# Patient Record
Sex: MALE | Race: WHITE | Employment: UNEMPLOYED | URBAN - METROPOLITAN AREA
[De-identification: names, ages, dates, MRNs, and addresses within clinical notes are randomized per-mention and may not be internally consistent; named-entity substitution may affect disease eponyms.]

---

## 2019-04-07 ENCOUNTER — APPOINTMENT (OUTPATIENT)
Dept: GENERAL RADIOLOGY | Age: 54
DRG: 165 | End: 2019-04-07
Payer: MEDICAID

## 2019-04-07 ENCOUNTER — ANESTHESIA EVENT (OUTPATIENT)
Dept: OPERATING ROOM | Age: 54
DRG: 165 | End: 2019-04-07
Payer: MEDICAID

## 2019-04-07 ENCOUNTER — HOSPITAL ENCOUNTER (INPATIENT)
Age: 54
LOS: 18 days | Discharge: NURSING, REHAB OR LONG-TERM CARE | DRG: 165 | End: 2019-04-25
Attending: THORACIC SURGERY (CARDIOTHORACIC VASCULAR SURGERY) | Admitting: INTERNAL MEDICINE
Payer: MEDICAID

## 2019-04-07 ENCOUNTER — ANESTHESIA (OUTPATIENT)
Dept: OPERATING ROOM | Age: 54
DRG: 165 | End: 2019-04-07
Payer: MEDICAID

## 2019-04-07 VITALS — OXYGEN SATURATION: 98 % | SYSTOLIC BLOOD PRESSURE: 39 MMHG | DIASTOLIC BLOOD PRESSURE: 22 MMHG

## 2019-04-07 DIAGNOSIS — I21.09 ACUTE ST ELEVATION MYOCARDIAL INFARCTION (STEMI) OF ANTERIOR WALL (HCC): Primary | ICD-10-CM

## 2019-04-07 DIAGNOSIS — I46.9 CARDIAC ARREST (HCC): ICD-10-CM

## 2019-04-07 PROBLEM — I73.9 PAD (PERIPHERAL ARTERY DISEASE) (HCC): Status: ACTIVE | Noted: 2019-04-07

## 2019-04-07 PROBLEM — J96.01 ACUTE RESPIRATORY FAILURE WITH HYPOXEMIA (HCC): Status: ACTIVE | Noted: 2019-04-07

## 2019-04-07 PROBLEM — I51.89 SEVERE LEFT VENTRICULAR SYSTOLIC DYSFUNCTION: Status: ACTIVE | Noted: 2019-04-07

## 2019-04-07 PROBLEM — I50.1 CARDIOGENIC PULMONARY EDEMA (HCC): Status: ACTIVE | Noted: 2019-04-07

## 2019-04-07 PROBLEM — I51.9 SEVERE LEFT VENTRICULAR SYSTOLIC DYSFUNCTION: Status: ACTIVE | Noted: 2019-04-07

## 2019-04-07 LAB
ABSOLUTE EOS #: 0.53 K/UL (ref 0–0.4)
ABSOLUTE IMMATURE GRANULOCYTE: 0.53 K/UL (ref 0–0.3)
ABSOLUTE LYMPH #: 5.78 K/UL (ref 1–4.8)
ABSOLUTE MONO #: 0.18 K/UL (ref 0.1–0.8)
ALBUMIN SERPL-MCNC: 3.8 G/DL (ref 3.5–5.2)
ALBUMIN/GLOBULIN RATIO: 2.9 (ref 1–2.5)
ALLEN TEST: ABNORMAL
ALP BLD-CCNC: 37 U/L (ref 40–129)
ALT SERPL-CCNC: 63 U/L (ref 5–41)
ANION GAP SERPL CALCULATED.3IONS-SCNC: 12 MMOL/L (ref 9–17)
ANION GAP SERPL CALCULATED.3IONS-SCNC: 24 MMOL/L (ref 9–17)
ANION GAP SERPL CALCULATED.3IONS-SCNC: 7 MMOL/L (ref 9–17)
ANION GAP SERPL CALCULATED.3IONS-SCNC: 9 MMOL/L (ref 9–17)
ANION GAP: 11 MMOL/L (ref 7–16)
ANION GAP: 11 MMOL/L (ref 7–16)
ANION GAP: 16 MMOL/L (ref 7–16)
ANION GAP: 3 MMOL/L (ref 7–16)
ANION GAP: 6 MMOL/L (ref 7–16)
AST SERPL-CCNC: 148 U/L
BASOPHILS # BLD: 0 % (ref 0–2)
BASOPHILS ABSOLUTE: 0 K/UL (ref 0–0.2)
BILIRUB SERPL-MCNC: 0.71 MG/DL (ref 0.3–1.2)
BILIRUBIN DIRECT: 0.18 MG/DL
BILIRUBIN, INDIRECT: 0.53 MG/DL (ref 0–1)
BNP INTERPRETATION: ABNORMAL
BUN BLDV-MCNC: 16 MG/DL (ref 6–20)
BUN/CREAT BLD: ABNORMAL (ref 9–20)
CALCIUM IONIZED: 1.01 MMOL/L (ref 1.13–1.33)
CALCIUM SERPL-MCNC: 8.1 MG/DL (ref 8.6–10.4)
CALCIUM SERPL-MCNC: 8.1 MG/DL (ref 8.6–10.4)
CALCIUM SERPL-MCNC: 8.4 MG/DL (ref 8.6–10.4)
CHLORIDE BLD-SCNC: 111 MMOL/L (ref 98–107)
CHLORIDE BLD-SCNC: 116 MMOL/L (ref 98–107)
CHLORIDE BLD-SCNC: 117 MMOL/L (ref 98–107)
CHLORIDE BLD-SCNC: 95 MMOL/L (ref 98–107)
CO2: 15 MMOL/L (ref 20–31)
CO2: 23 MMOL/L (ref 20–31)
CO2: 24 MMOL/L (ref 20–31)
CO2: 26 MMOL/L (ref 20–31)
CREAT SERPL-MCNC: 0.65 MG/DL (ref 0.7–1.2)
CREAT SERPL-MCNC: 0.66 MG/DL (ref 0.7–1.2)
CREAT SERPL-MCNC: 0.9 MG/DL (ref 0.7–1.2)
DIFFERENTIAL TYPE: ABNORMAL
EOSINOPHILS RELATIVE PERCENT: 3 % (ref 1–4)
ESTIMATED AVERAGE GLUCOSE: 263 MG/DL
FIO2: 100
FIO2: 50
FIO2: 60
FIO2: ABNORMAL
GFR AFRICAN AMERICAN: >60 ML/MIN
GFR NON-AFRICAN AMERICAN: >60 ML/MIN
GFR SERPL CREATININE-BSD FRML MDRD: >60 ML/MIN
GFR SERPL CREATININE-BSD FRML MDRD: ABNORMAL ML/MIN/{1.73_M2}
GFR SERPL CREATININE-BSD FRML MDRD: NORMAL ML/MIN/{1.73_M2}
GLOBULIN: ABNORMAL G/DL (ref 1.5–3.8)
GLUCOSE BLD-MCNC: 110 MG/DL (ref 70–99)
GLUCOSE BLD-MCNC: 111 MG/DL (ref 75–110)
GLUCOSE BLD-MCNC: 113 MG/DL (ref 74–100)
GLUCOSE BLD-MCNC: 125 MG/DL (ref 75–110)
GLUCOSE BLD-MCNC: 133 MG/DL (ref 75–110)
GLUCOSE BLD-MCNC: 154 MG/DL (ref 74–100)
GLUCOSE BLD-MCNC: 154 MG/DL (ref 75–110)
GLUCOSE BLD-MCNC: 155 MG/DL (ref 70–99)
GLUCOSE BLD-MCNC: 169 MG/DL (ref 74–100)
GLUCOSE BLD-MCNC: 270 MG/DL (ref 74–100)
GLUCOSE BLD-MCNC: 322 MG/DL (ref 74–100)
GLUCOSE BLD-MCNC: 428 MG/DL (ref 74–100)
GLUCOSE BLD-MCNC: 432 MG/DL (ref 74–100)
GLUCOSE BLD-MCNC: 495 MG/DL (ref 74–100)
GLUCOSE BLD-MCNC: 496 MG/DL (ref 74–100)
GLUCOSE BLD-MCNC: 506 MG/DL (ref 74–100)
GLUCOSE BLD-MCNC: 573 MG/DL (ref 70–99)
GLUCOSE BLD-MCNC: 81 MG/DL (ref 75–110)
GLUCOSE BLD-MCNC: 85 MG/DL (ref 75–110)
GLUCOSE BLD-MCNC: 92 MG/DL (ref 74–100)
HBA1C MFR BLD: 10.8 % (ref 4–6)
HBCO, MIXED, EXTENDED: 0.1 % (ref 0–5)
HBCO, MIXED, EXTENDED: 1.9 % (ref 0–5)
HCT VFR BLD CALC: 32 % (ref 40.7–50.3)
HCT VFR BLD CALC: 32.1 % (ref 40.7–50.3)
HCT VFR BLD CALC: 39.4 % (ref 40.7–50.3)
HCT VFR BLD CALC: 51.9 % (ref 40.7–50.3)
HEMOGLOBIN, MIXED, EXTENDED: 10.7 G/DL (ref 12–18)
HEMOGLOBIN, MIXED, EXTENDED: 9.7 G/DL (ref 12–18)
HEMOGLOBIN: 10.4 G/DL (ref 13–17)
HEMOGLOBIN: 10.6 G/DL (ref 13–17)
HEMOGLOBIN: 12.8 G/DL (ref 13–17)
HEMOGLOBIN: 15.7 G/DL (ref 13–17)
IMMATURE GRANULOCYTES: 3 %
INR BLD: 1.2
INR BLD: 1.3
LYMPHOCYTES # BLD: 33 % (ref 24–44)
MAGNESIUM: 2.2 MG/DL (ref 1.6–2.6)
MAGNESIUM: 2.3 MG/DL (ref 1.6–2.6)
MAGNESIUM: 2.6 MG/DL (ref 1.6–2.6)
MCH RBC QN AUTO: 30.4 PG (ref 25.2–33.5)
MCH RBC QN AUTO: 30.5 PG (ref 25.2–33.5)
MCH RBC QN AUTO: 30.8 PG (ref 25.2–33.5)
MCHC RBC AUTO-ENTMCNC: 30.3 G/DL (ref 28.4–34.8)
MCHC RBC AUTO-ENTMCNC: 32.5 G/DL (ref 28.4–34.8)
MCHC RBC AUTO-ENTMCNC: 33 G/DL (ref 28.4–34.8)
MCV RBC AUTO: 100.6 FL (ref 82.6–102.9)
MCV RBC AUTO: 92.5 FL (ref 82.6–102.9)
MCV RBC AUTO: 94.7 FL (ref 82.6–102.9)
METHB, MIXED, EXTENDED: 0.7 % (ref 0–1.5)
METHB, MIXED, EXTENDED: 1.2 % (ref 0–1.5)
MODE: ABNORMAL
MONOCYTES # BLD: 1 % (ref 1–7)
MORPHOLOGY: NORMAL
NEGATIVE BASE EXCESS, ART: 10 (ref 0–2)
NEGATIVE BASE EXCESS, ART: 4 (ref 0–2)
NEGATIVE BASE EXCESS, ART: 6 (ref 0–2)
NEGATIVE BASE EXCESS, ART: ABNORMAL (ref 0–2)
NRBC AUTOMATED: 0 PER 100 WBC
NRBC AUTOMATED: 0 PER 100 WBC
NRBC AUTOMATED: 0.1 PER 100 WBC
O2 CONTENT, MIXED, EXTENDED: 8 VOL % (ref 12–20)
O2 CONTENT, MIXED, EXTENDED: 8 VOL % (ref 12–20)
O2 DEVICE/FLOW/%: ABNORMAL
O2 SAT, MIXED, EXTENDED: 55.8 % (ref 60–80)
O2 SAT, MIXED, EXTENDED: 58 % (ref 60–80)
OXYGEN STATUS: 97
OXYGEN STATUS: ABNORMAL
PARTIAL THROMBOPLASTIN TIME: 25.7 SEC (ref 20.5–30.5)
PARTIAL THROMBOPLASTIN TIME: 26.3 SEC (ref 20.5–30.5)
PATIENT TEMP: 38.8
PATIENT TEMP: 38.9
PATIENT TEMP: ABNORMAL
PDW BLD-RTO: 12.9 % (ref 11.8–14.4)
PDW BLD-RTO: 13.1 % (ref 11.8–14.4)
PDW BLD-RTO: 13.2 % (ref 11.8–14.4)
PLATELET # BLD: 156 K/UL (ref 138–453)
PLATELET # BLD: 318 K/UL (ref 138–453)
PLATELET # BLD: ABNORMAL K/UL (ref 138–453)
PLATELET ESTIMATE: ABNORMAL
PLATELET, FLUORESCENCE: 143 K/UL (ref 138–453)
PLATELET, IMMATURE FRACTION: 4.2 % (ref 1.1–10.3)
PMV BLD AUTO: 10.1 FL (ref 8.1–13.5)
PMV BLD AUTO: 10.7 FL (ref 8.1–13.5)
PMV BLD AUTO: ABNORMAL FL (ref 8.1–13.5)
POC CHLORIDE: 100 MMOL/L (ref 98–107)
POC CHLORIDE: 105 MMOL/L (ref 98–107)
POC CHLORIDE: 106 MMOL/L (ref 98–107)
POC CHLORIDE: 106 MMOL/L (ref 98–107)
POC CHLORIDE: 109 MMOL/L (ref 98–107)
POC CREATININE: 0.72 MG/DL (ref 0.51–1.19)
POC CREATININE: 0.81 MG/DL (ref 0.51–1.19)
POC CREATININE: 0.83 MG/DL (ref 0.51–1.19)
POC CREATININE: 0.9 MG/DL (ref 0.51–1.19)
POC HCO3: 22.2 MMOL/L (ref 21–28)
POC HCO3: 24.1 MMOL/L (ref 21–28)
POC HCO3: 24.6 MMOL/L (ref 21–28)
POC HCO3: 24.7 MMOL/L (ref 21–28)
POC HCO3: 26.9 MMOL/L (ref 21–28)
POC HCO3: 27 MMOL/L (ref 21–28)
POC HCO3: 27.1 MMOL/L (ref 21–28)
POC HCO3: 27.3 MMOL/L (ref 21–28)
POC HCO3: 27.3 MMOL/L (ref 21–28)
POC HCO3: 28.6 MMOL/L (ref 21–28)
POC HCO3: 28.8 MMOL/L (ref 21–28)
POC HCO3: 29.3 MMOL/L (ref 21–28)
POC HCO3: 30.3 MMOL/L (ref 21–28)
POC HEMATOCRIT: 21 % (ref 41–53)
POC HEMATOCRIT: 21 % (ref 41–53)
POC HEMATOCRIT: 27 % (ref 41–53)
POC HEMATOCRIT: 29 % (ref 41–53)
POC HEMATOCRIT: 34 % (ref 41–53)
POC HEMATOCRIT: 37 % (ref 41–53)
POC HEMATOCRIT: 42 % (ref 41–53)
POC HEMATOCRIT: 53 % (ref 41–53)
POC HEMATOCRIT: 54 % (ref 41–53)
POC HEMOGLOBIN: 10 G/DL (ref 13.5–17.5)
POC HEMOGLOBIN: 11.5 G/DL (ref 13.5–17.5)
POC HEMOGLOBIN: 12.7 G/DL (ref 13.5–17.5)
POC HEMOGLOBIN: 14.3 G/DL (ref 13.5–17.5)
POC HEMOGLOBIN: 18 G/DL (ref 13.5–17.5)
POC HEMOGLOBIN: 18.5 G/DL (ref 13.5–17.5)
POC HEMOGLOBIN: 7.1 G/DL (ref 13.5–17.5)
POC HEMOGLOBIN: 7.2 G/DL (ref 13.5–17.5)
POC HEMOGLOBIN: 9.3 G/DL (ref 13.5–17.5)
POC IONIZED CALCIUM: 0.96 MMOL/L (ref 1.15–1.33)
POC IONIZED CALCIUM: 0.96 MMOL/L (ref 1.15–1.33)
POC IONIZED CALCIUM: 0.98 MMOL/L (ref 1.15–1.33)
POC IONIZED CALCIUM: 1.14 MMOL/L (ref 1.15–1.33)
POC IONIZED CALCIUM: 1.17 MMOL/L (ref 1.15–1.33)
POC IONIZED CALCIUM: 1.2 MMOL/L (ref 1.15–1.33)
POC IONIZED CALCIUM: 1.2 MMOL/L (ref 1.15–1.33)
POC IONIZED CALCIUM: 1.35 MMOL/L (ref 1.15–1.33)
POC IONIZED CALCIUM: 1.43 MMOL/L (ref 1.15–1.33)
POC LACTIC ACID: 1.35 MMOL/L (ref 0.56–1.39)
POC LACTIC ACID: 1.82 MMOL/L (ref 0.56–1.39)
POC LACTIC ACID: 3.1 MMOL/L (ref 0.56–1.39)
POC LACTIC ACID: 6.26 MMOL/L (ref 0.56–1.39)
POC LACTIC ACID: 7.96 MMOL/L (ref 0.56–1.39)
POC O2 SATURATION: 100 % (ref 94–98)
POC O2 SATURATION: 66 % (ref 94–98)
POC O2 SATURATION: 94 % (ref 94–98)
POC O2 SATURATION: 95 % (ref 94–98)
POC O2 SATURATION: 96 % (ref 94–98)
POC O2 SATURATION: 98 % (ref 94–98)
POC O2 SATURATION: 99 % (ref 94–98)
POC PCO2 TEMP: 30 MM HG
POC PCO2 TEMP: 31 MM HG
POC PCO2 TEMP: ABNORMAL MM HG
POC PCO2: 28.1 MM HG (ref 35–48)
POC PCO2: 28.7 MM HG (ref 35–48)
POC PCO2: 34.5 MM HG (ref 35–48)
POC PCO2: 40.8 MM HG (ref 35–48)
POC PCO2: 45.5 MM HG (ref 35–48)
POC PCO2: 47.9 MM HG (ref 35–48)
POC PCO2: 51.7 MM HG (ref 35–48)
POC PCO2: 53.6 MM HG (ref 35–48)
POC PCO2: 55.5 MM HG (ref 35–48)
POC PCO2: 58.3 MM HG (ref 35–48)
POC PCO2: 59.4 MM HG (ref 35–48)
POC PCO2: 77.3 MM HG (ref 35–48)
POC PCO2: 85.9 MM HG (ref 35–48)
POC PH TEMP: 7.51
POC PH TEMP: 7.52
POC PH TEMP: ABNORMAL
POC PH: 7.07 (ref 7.35–7.45)
POC PH: 7.11 (ref 7.35–7.45)
POC PH: 7.25 (ref 7.35–7.45)
POC PH: 7.29 (ref 7.35–7.45)
POC PH: 7.32 (ref 7.35–7.45)
POC PH: 7.33 (ref 7.35–7.45)
POC PH: 7.33 (ref 7.35–7.45)
POC PH: 7.38 (ref 7.35–7.45)
POC PH: 7.39 (ref 7.35–7.45)
POC PH: 7.43 (ref 7.35–7.45)
POC PH: 7.51 (ref 7.35–7.45)
POC PH: 7.54 (ref 7.35–7.45)
POC PH: 7.55 (ref 7.35–7.45)
POC PO2 TEMP: 105 MM HG
POC PO2 TEMP: 82 MM HG
POC PO2 TEMP: ABNORMAL MM HG
POC PO2: 102.6 MM HG (ref 83–108)
POC PO2: 128.7 MM HG (ref 83–108)
POC PO2: 158.5 MM HG (ref 83–108)
POC PO2: 216.6 MM HG (ref 83–108)
POC PO2: 297.7 MM HG (ref 83–108)
POC PO2: 317.1 MM HG (ref 83–108)
POC PO2: 343.8 MM HG (ref 83–108)
POC PO2: 442.5 MM HG (ref 83–108)
POC PO2: 49.5 MM HG (ref 83–108)
POC PO2: 72.2 MM HG (ref 83–108)
POC PO2: 79.3 MM HG (ref 83–108)
POC PO2: 87.7 MM HG (ref 83–108)
POC PO2: 93.6 MM HG (ref 83–108)
POC POTASSIUM: 3.2 MMOL/L (ref 3.5–4.5)
POC POTASSIUM: 3.3 MMOL/L (ref 3.5–4.5)
POC POTASSIUM: 3.3 MMOL/L (ref 3.5–4.5)
POC POTASSIUM: 3.6 MMOL/L (ref 3.5–4.5)
POC POTASSIUM: 3.6 MMOL/L (ref 3.5–4.5)
POC POTASSIUM: 3.7 MMOL/L (ref 3.5–4.5)
POC POTASSIUM: 4.4 MMOL/L (ref 3.5–4.5)
POC POTASSIUM: 4.9 MMOL/L (ref 3.5–4.5)
POC POTASSIUM: 5.9 MMOL/L (ref 3.5–4.5)
POC SODIUM: 135 MMOL/L (ref 138–146)
POC SODIUM: 136 MMOL/L (ref 138–146)
POC SODIUM: 139 MMOL/L (ref 138–146)
POC SODIUM: 143 MMOL/L (ref 138–146)
POC SODIUM: 149 MMOL/L (ref 138–146)
POSITIVE BASE EXCESS, ART: 1 (ref 0–3)
POSITIVE BASE EXCESS, ART: 1 (ref 0–3)
POSITIVE BASE EXCESS, ART: 2 (ref 0–3)
POSITIVE BASE EXCESS, ART: 3 (ref 0–3)
POSITIVE BASE EXCESS, ART: 4 (ref 0–3)
POSITIVE BASE EXCESS, ART: ABNORMAL (ref 0–3)
POTASSIUM SERPL-SCNC: 3.3 MMOL/L (ref 3.7–5.3)
POTASSIUM SERPL-SCNC: 3.7 MMOL/L (ref 3.7–5.3)
POTASSIUM SERPL-SCNC: 3.9 MMOL/L (ref 3.7–5.3)
POTASSIUM SERPL-SCNC: 4.2 MMOL/L (ref 3.7–5.3)
PRO-BNP: 1816 PG/ML
PROTHROMBIN TIME: 12.6 SEC (ref 9–12)
PROTHROMBIN TIME: 13.8 SEC (ref 9–12)
RBC # BLD: 3.47 M/UL (ref 4.21–5.77)
RBC # BLD: 4.16 M/UL (ref 4.21–5.77)
RBC # BLD: 5.16 M/UL (ref 4.21–5.77)
RBC # BLD: ABNORMAL 10*6/UL
SAMPLE SITE: ABNORMAL
SEG NEUTROPHILS: 60 % (ref 36–66)
SEGMENTED NEUTROPHILS ABSOLUTE COUNT: 10.48 K/UL (ref 1.8–7.7)
SODIUM BLD-SCNC: 134 MMOL/L (ref 135–144)
SODIUM BLD-SCNC: 147 MMOL/L (ref 135–144)
SODIUM BLD-SCNC: 149 MMOL/L (ref 135–144)
SODIUM BLD-SCNC: 149 MMOL/L (ref 135–144)
TCO2 (CALC), ART: 25 MMOL/L (ref 22–29)
TCO2 (CALC), ART: 25 MMOL/L (ref 22–29)
TCO2 (CALC), ART: 26 MMOL/L (ref 22–29)
TCO2 (CALC), ART: 26 MMOL/L (ref 22–29)
TCO2 (CALC), ART: 28 MMOL/L (ref 22–29)
TCO2 (CALC), ART: 29 MMOL/L (ref 22–29)
TCO2 (CALC), ART: 30 MMOL/L (ref 22–29)
TCO2 (CALC), ART: 30 MMOL/L (ref 22–29)
TCO2 (CALC), ART: 31 MMOL/L (ref 22–29)
TCO2 (CALC), ART: 31 MMOL/L (ref 22–29)
TCO2 (CALC), ART: 32 MMOL/L (ref 22–29)
TOTAL PROTEIN: 5.1 G/DL (ref 6.4–8.3)
TROPONIN INTERP: ABNORMAL
TROPONIN T: ABNORMAL NG/ML
TROPONIN, HIGH SENSITIVITY: 33 NG/L (ref 0–22)
WBC # BLD: 15.6 K/UL (ref 3.5–11.3)
WBC # BLD: 17.5 K/UL (ref 3.5–11.3)
WBC # BLD: 26.4 K/UL (ref 3.5–11.3)
WBC # BLD: ABNORMAL 10*3/UL

## 2019-04-07 PROCEDURE — 85610 PROTHROMBIN TIME: CPT

## 2019-04-07 PROCEDURE — B3101ZZ FLUOROSCOPY OF THORACIC AORTA USING LOW OSMOLAR CONTRAST: ICD-10-PCS | Performed by: INTERNAL MEDICINE

## 2019-04-07 PROCEDURE — 99291 CRITICAL CARE FIRST HOUR: CPT | Performed by: INTERNAL MEDICINE

## 2019-04-07 PROCEDURE — P9041 ALBUMIN (HUMAN),5%, 50ML: HCPCS | Performed by: NURSE PRACTITIONER

## 2019-04-07 PROCEDURE — 6360000002 HC RX W HCPCS: Performed by: INTERNAL MEDICINE

## 2019-04-07 PROCEDURE — 3700000001 HC ADD 15 MINUTES (ANESTHESIA): Performed by: THORACIC SURGERY (CARDIOTHORACIC VASCULAR SURGERY)

## 2019-04-07 PROCEDURE — 85576 BLOOD PLATELET AGGREGATION: CPT

## 2019-04-07 PROCEDURE — 94770 HC ETCO2 MONITOR DAILY: CPT

## 2019-04-07 PROCEDURE — 99254 IP/OBS CNSLTJ NEW/EST MOD 60: CPT | Performed by: INTERNAL MEDICINE

## 2019-04-07 PROCEDURE — 2580000003 HC RX 258: Performed by: THORACIC SURGERY (CARDIOTHORACIC VASCULAR SURGERY)

## 2019-04-07 PROCEDURE — 3600000008 HC SURGERY OHS BASE: Performed by: THORACIC SURGERY (CARDIOTHORACIC VASCULAR SURGERY)

## 2019-04-07 PROCEDURE — 2500000003 HC RX 250 WO HCPCS: Performed by: NURSE ANESTHETIST, CERTIFIED REGISTERED

## 2019-04-07 PROCEDURE — 2709999900 HC NON-CHARGEABLE SUPPLY: Performed by: THORACIC SURGERY (CARDIOTHORACIC VASCULAR SURGERY)

## 2019-04-07 PROCEDURE — 94002 VENT MGMT INPAT INIT DAY: CPT

## 2019-04-07 PROCEDURE — 84295 ASSAY OF SERUM SODIUM: CPT

## 2019-04-07 PROCEDURE — 2709999900 HC NON-CHARGEABLE SUPPLY

## 2019-04-07 PROCEDURE — 82565 ASSAY OF CREATININE: CPT

## 2019-04-07 PROCEDURE — 82330 ASSAY OF CALCIUM: CPT

## 2019-04-07 PROCEDURE — 80051 ELECTROLYTE PANEL: CPT

## 2019-04-07 PROCEDURE — 2500000003 HC RX 250 WO HCPCS: Performed by: THORACIC SURGERY (CARDIOTHORACIC VASCULAR SURGERY)

## 2019-04-07 PROCEDURE — 85384 FIBRINOGEN ACTIVITY: CPT

## 2019-04-07 PROCEDURE — 6370000000 HC RX 637 (ALT 250 FOR IP): Performed by: NURSE ANESTHETIST, CERTIFIED REGISTERED

## 2019-04-07 PROCEDURE — 85055 RETICULATED PLATELET ASSAY: CPT

## 2019-04-07 PROCEDURE — 83605 ASSAY OF LACTIC ACID: CPT

## 2019-04-07 PROCEDURE — 83880 ASSAY OF NATRIURETIC PEPTIDE: CPT

## 2019-04-07 PROCEDURE — 82375 ASSAY CARBOXYHB QUANT: CPT

## 2019-04-07 PROCEDURE — 86920 COMPATIBILITY TEST SPIN: CPT

## 2019-04-07 PROCEDURE — 6360000002 HC RX W HCPCS

## 2019-04-07 PROCEDURE — 93458 L HRT ARTERY/VENTRICLE ANGIO: CPT | Performed by: INTERNAL MEDICINE

## 2019-04-07 PROCEDURE — B2151ZZ FLUOROSCOPY OF LEFT HEART USING LOW OSMOLAR CONTRAST: ICD-10-PCS | Performed by: INTERNAL MEDICINE

## 2019-04-07 PROCEDURE — 82947 ASSAY GLUCOSE BLOOD QUANT: CPT

## 2019-04-07 PROCEDURE — 2700000000 HC OXYGEN THERAPY PER DAY

## 2019-04-07 PROCEDURE — 83050 HGB METHEMOGLOBIN QUAN: CPT

## 2019-04-07 PROCEDURE — 73562 X-RAY EXAM OF KNEE 3: CPT

## 2019-04-07 PROCEDURE — 6370000000 HC RX 637 (ALT 250 FOR IP): Performed by: THORACIC SURGERY (CARDIOTHORACIC VASCULAR SURGERY)

## 2019-04-07 PROCEDURE — 3600000018 HC SURGERY OHS ADDTL 15MIN: Performed by: THORACIC SURGERY (CARDIOTHORACIC VASCULAR SURGERY)

## 2019-04-07 PROCEDURE — P9045 ALBUMIN (HUMAN), 5%, 250 ML: HCPCS | Performed by: NURSE ANESTHETIST, CERTIFIED REGISTERED

## 2019-04-07 PROCEDURE — C1894 INTRO/SHEATH, NON-LASER: HCPCS

## 2019-04-07 PROCEDURE — 6370000000 HC RX 637 (ALT 250 FOR IP): Performed by: NURSE PRACTITIONER

## 2019-04-07 PROCEDURE — 93567 NJX CAR CTH SPRVLV AORTGRPHY: CPT | Performed by: INTERNAL MEDICINE

## 2019-04-07 PROCEDURE — 73552 X-RAY EXAM OF FEMUR 2/>: CPT

## 2019-04-07 PROCEDURE — 82435 ASSAY OF BLOOD CHLORIDE: CPT

## 2019-04-07 PROCEDURE — 87205 SMEAR GRAM STAIN: CPT

## 2019-04-07 PROCEDURE — B24BZZ4 ULTRASONOGRAPHY OF HEART WITH AORTA, TRANSESOPHAGEAL: ICD-10-PCS | Performed by: ANESTHESIOLOGY

## 2019-04-07 PROCEDURE — 6360000002 HC RX W HCPCS: Performed by: NURSE PRACTITIONER

## 2019-04-07 PROCEDURE — 6360000002 HC RX W HCPCS: Performed by: NURSE ANESTHETIST, CERTIFIED REGISTERED

## 2019-04-07 PROCEDURE — 85014 HEMATOCRIT: CPT

## 2019-04-07 PROCEDURE — 87075 CULTR BACTERIA EXCEPT BLOOD: CPT

## 2019-04-07 PROCEDURE — 7100000000 HC PACU RECOVERY - FIRST 15 MIN

## 2019-04-07 PROCEDURE — 85027 COMPLETE CBC AUTOMATED: CPT

## 2019-04-07 PROCEDURE — 83036 HEMOGLOBIN GLYCOSYLATED A1C: CPT

## 2019-04-07 PROCEDURE — 85390 FIBRINOLYSINS SCREEN I&R: CPT

## 2019-04-07 PROCEDURE — 0BH18EZ INSERTION OF ENDOTRACHEAL AIRWAY INTO TRACHEA, VIA NATURAL OR ARTIFICIAL OPENING ENDOSCOPIC: ICD-10-PCS | Performed by: EMERGENCY MEDICINE

## 2019-04-07 PROCEDURE — 2500000003 HC RX 250 WO HCPCS: Performed by: NURSE PRACTITIONER

## 2019-04-07 PROCEDURE — 2500000003 HC RX 250 WO HCPCS

## 2019-04-07 PROCEDURE — 33519 CABG ARTERY-VEIN THREE: CPT | Performed by: THORACIC SURGERY (CARDIOTHORACIC VASCULAR SURGERY)

## 2019-04-07 PROCEDURE — 04JY3ZZ INSPECTION OF LOWER ARTERY, PERCUTANEOUS APPROACH: ICD-10-PCS | Performed by: INTERNAL MEDICINE

## 2019-04-07 PROCEDURE — 021209W BYPASS CORONARY ARTERY, THREE ARTERIES FROM AORTA WITH AUTOLOGOUS VENOUS TISSUE, OPEN APPROACH: ICD-10-PCS | Performed by: THORACIC SURGERY (CARDIOTHORACIC VASCULAR SURGERY)

## 2019-04-07 PROCEDURE — 6360000004 HC RX CONTRAST MEDICATION

## 2019-04-07 PROCEDURE — 93005 ELECTROCARDIOGRAM TRACING: CPT

## 2019-04-07 PROCEDURE — 2580000003 HC RX 258: Performed by: NURSE ANESTHETIST, CERTIFIED REGISTERED

## 2019-04-07 PROCEDURE — 88300 SURGICAL PATH GROSS: CPT

## 2019-04-07 PROCEDURE — 85018 HEMOGLOBIN: CPT

## 2019-04-07 PROCEDURE — 7100000001 HC PACU RECOVERY - ADDTL 15 MIN

## 2019-04-07 PROCEDURE — 06BQ0ZZ EXCISION OF LEFT SAPHENOUS VEIN, OPEN APPROACH: ICD-10-PCS | Performed by: THORACIC SURGERY (CARDIOTHORACIC VASCULAR SURGERY)

## 2019-04-07 PROCEDURE — 82805 BLOOD GASES W/O2 SATURATION: CPT

## 2019-04-07 PROCEDURE — 99291 CRITICAL CARE FIRST HOUR: CPT

## 2019-04-07 PROCEDURE — C1769 GUIDE WIRE: HCPCS

## 2019-04-07 PROCEDURE — 82803 BLOOD GASES ANY COMBINATION: CPT

## 2019-04-07 PROCEDURE — 80076 HEPATIC FUNCTION PANEL: CPT

## 2019-04-07 PROCEDURE — 87070 CULTURE OTHR SPECIMN AEROBIC: CPT

## 2019-04-07 PROCEDURE — 96374 THER/PROPH/DIAG INJ IV PUSH: CPT

## 2019-04-07 PROCEDURE — 86900 BLOOD TYPING SEROLOGIC ABO: CPT

## 2019-04-07 PROCEDURE — 71045 X-RAY EXAM CHEST 1 VIEW: CPT

## 2019-04-07 PROCEDURE — 6360000002 HC RX W HCPCS: Performed by: THORACIC SURGERY (CARDIOTHORACIC VASCULAR SURGERY)

## 2019-04-07 PROCEDURE — 0QPKX4Z REMOVAL OF INTERNAL FIXATION DEVICE FROM LEFT FIBULA, EXTERNAL APPROACH: ICD-10-PCS | Performed by: ORTHOPAEDIC SURGERY

## 2019-04-07 PROCEDURE — 83735 ASSAY OF MAGNESIUM: CPT

## 2019-04-07 PROCEDURE — 2580000003 HC RX 258: Performed by: NURSE PRACTITIONER

## 2019-04-07 PROCEDURE — 2100000001 HC CVICU R&B

## 2019-04-07 PROCEDURE — 33508 ENDOSCOPIC VEIN HARVEST: CPT | Performed by: THORACIC SURGERY (CARDIOTHORACIC VASCULAR SURGERY)

## 2019-04-07 PROCEDURE — 84484 ASSAY OF TROPONIN QUANT: CPT

## 2019-04-07 PROCEDURE — 33533 CABG ARTERIAL SINGLE: CPT | Performed by: THORACIC SURGERY (CARDIOTHORACIC VASCULAR SURGERY)

## 2019-04-07 PROCEDURE — 6360000002 HC RX W HCPCS: Performed by: EMERGENCY MEDICINE

## 2019-04-07 PROCEDURE — B2111ZZ FLUOROSCOPY OF MULTIPLE CORONARY ARTERIES USING LOW OSMOLAR CONTRAST: ICD-10-PCS | Performed by: INTERNAL MEDICINE

## 2019-04-07 PROCEDURE — 84132 ASSAY OF SERUM POTASSIUM: CPT

## 2019-04-07 PROCEDURE — 85347 COAGULATION TIME ACTIVATED: CPT

## 2019-04-07 PROCEDURE — 80048 BASIC METABOLIC PNL TOTAL CA: CPT

## 2019-04-07 PROCEDURE — 85730 THROMBOPLASTIN TIME PARTIAL: CPT

## 2019-04-07 PROCEDURE — 86901 BLOOD TYPING SEROLOGIC RH(D): CPT

## 2019-04-07 PROCEDURE — 31500 INSERT EMERGENCY AIRWAY: CPT

## 2019-04-07 PROCEDURE — 3700000000 HC ANESTHESIA ATTENDED CARE: Performed by: THORACIC SURGERY (CARDIOTHORACIC VASCULAR SURGERY)

## 2019-04-07 PROCEDURE — 94762 N-INVAS EAR/PLS OXIMTRY CONT: CPT

## 2019-04-07 PROCEDURE — 86850 RBC ANTIBODY SCREEN: CPT

## 2019-04-07 PROCEDURE — 5A1221Z PERFORMANCE OF CARDIAC OUTPUT, CONTINUOUS: ICD-10-PCS | Performed by: THORACIC SURGERY (CARDIOTHORACIC VASCULAR SURGERY)

## 2019-04-07 PROCEDURE — 02100Z9 BYPASS CORONARY ARTERY, ONE ARTERY FROM LEFT INTERNAL MAMMARY, OPEN APPROACH: ICD-10-PCS | Performed by: THORACIC SURGERY (CARDIOTHORACIC VASCULAR SURGERY)

## 2019-04-07 PROCEDURE — 85025 COMPLETE CBC W/AUTO DIFF WBC: CPT

## 2019-04-07 PROCEDURE — 4A023N7 MEASUREMENT OF CARDIAC SAMPLING AND PRESSURE, LEFT HEART, PERCUTANEOUS APPROACH: ICD-10-PCS | Performed by: INTERNAL MEDICINE

## 2019-04-07 RX ORDER — MAGNESIUM HYDROXIDE 1200 MG/15ML
LIQUID ORAL CONTINUOUS PRN
Status: COMPLETED | OUTPATIENT
Start: 2019-04-07 | End: 2019-04-07

## 2019-04-07 RX ORDER — SODIUM CHLORIDE 9 MG/ML
INJECTION, SOLUTION INTRAVENOUS CONTINUOUS PRN
Status: DISCONTINUED | OUTPATIENT
Start: 2019-04-07 | End: 2019-04-07 | Stop reason: SDUPTHER

## 2019-04-07 RX ORDER — PROTAMINE SULFATE 10 MG/ML
50 INJECTION, SOLUTION INTRAVENOUS
Status: ACTIVE | OUTPATIENT
Start: 2019-04-07 | End: 2019-04-07

## 2019-04-07 RX ORDER — ATORVASTATIN CALCIUM 40 MG/1
40 TABLET, FILM COATED ORAL NIGHTLY
Status: DISCONTINUED | OUTPATIENT
Start: 2019-04-08 | End: 2019-04-25 | Stop reason: HOSPADM

## 2019-04-07 RX ORDER — VANCOMYCIN HYDROCHLORIDE 1 G/200ML
INJECTION, SOLUTION INTRAVENOUS PRN
Status: DISCONTINUED | OUTPATIENT
Start: 2019-04-07 | End: 2019-04-07 | Stop reason: SDUPTHER

## 2019-04-07 RX ORDER — HEPARIN SODIUM 1000 [USP'U]/ML
4000 INJECTION, SOLUTION INTRAVENOUS; SUBCUTANEOUS ONCE
Status: COMPLETED | OUTPATIENT
Start: 2019-04-07 | End: 2019-04-07

## 2019-04-07 RX ORDER — SODIUM CHLORIDE 9 MG/ML
INJECTION, SOLUTION INTRAVENOUS CONTINUOUS
Status: DISCONTINUED | OUTPATIENT
Start: 2019-04-07 | End: 2019-04-19

## 2019-04-07 RX ORDER — OXYCODONE HYDROCHLORIDE AND ACETAMINOPHEN 5; 325 MG/1; MG/1
1 TABLET ORAL EVERY 4 HOURS PRN
Status: DISCONTINUED | OUTPATIENT
Start: 2019-04-07 | End: 2019-04-25 | Stop reason: HOSPADM

## 2019-04-07 RX ORDER — DEXTROSE MONOHYDRATE 50 MG/ML
100 INJECTION, SOLUTION INTRAVENOUS PRN
Status: DISCONTINUED | OUTPATIENT
Start: 2019-04-07 | End: 2019-04-25 | Stop reason: HOSPADM

## 2019-04-07 RX ORDER — PROPOFOL 10 MG/ML
INJECTION, EMULSION INTRAVENOUS
Status: COMPLETED
Start: 2019-04-07 | End: 2019-04-07

## 2019-04-07 RX ORDER — ACETAMINOPHEN 325 MG/1
650 TABLET ORAL EVERY 4 HOURS PRN
Status: DISCONTINUED | OUTPATIENT
Start: 2019-04-07 | End: 2019-04-25 | Stop reason: HOSPADM

## 2019-04-07 RX ORDER — SODIUM CHLORIDE 0.9 % (FLUSH) 0.9 %
10 SYRINGE (ML) INJECTION PRN
Status: DISCONTINUED | OUTPATIENT
Start: 2019-04-07 | End: 2019-04-25 | Stop reason: HOSPADM

## 2019-04-07 RX ORDER — ATORVASTATIN CALCIUM 40 MG/1
40 TABLET, FILM COATED ORAL NIGHTLY
Status: DISCONTINUED | OUTPATIENT
Start: 2019-04-07 | End: 2019-04-07 | Stop reason: SDUPTHER

## 2019-04-07 RX ORDER — VANCOMYCIN HYDROCHLORIDE 1 G/200ML
1000 INJECTION, SOLUTION INTRAVENOUS EVERY 12 HOURS
Status: COMPLETED | OUTPATIENT
Start: 2019-04-07 | End: 2019-04-10

## 2019-04-07 RX ORDER — DIPHENHYDRAMINE HCL 25 MG
25 TABLET ORAL NIGHTLY PRN
Status: DISCONTINUED | OUTPATIENT
Start: 2019-04-08 | End: 2019-04-25 | Stop reason: HOSPADM

## 2019-04-07 RX ORDER — ALBUMIN, HUMAN INJ 5% 5 %
SOLUTION INTRAVENOUS PRN
Status: DISCONTINUED | OUTPATIENT
Start: 2019-04-07 | End: 2019-04-07 | Stop reason: SDUPTHER

## 2019-04-07 RX ORDER — M-VIT,TX,IRON,MINS/CALC/FOLIC 27MG-0.4MG
1 TABLET ORAL
Status: DISCONTINUED | OUTPATIENT
Start: 2019-04-08 | End: 2019-04-25 | Stop reason: HOSPADM

## 2019-04-07 RX ORDER — HEPARIN SODIUM 10000 [USP'U]/100ML
12 INJECTION, SOLUTION INTRAVENOUS CONTINUOUS
Status: DISCONTINUED | OUTPATIENT
Start: 2019-04-07 | End: 2019-04-07

## 2019-04-07 RX ORDER — ACETAMINOPHEN 325 MG/1
650 TABLET ORAL EVERY 4 HOURS PRN
Status: DISCONTINUED | OUTPATIENT
Start: 2019-04-07 | End: 2019-04-07

## 2019-04-07 RX ORDER — ONDANSETRON 2 MG/ML
4 INJECTION INTRAMUSCULAR; INTRAVENOUS EVERY 6 HOURS PRN
Status: DISCONTINUED | OUTPATIENT
Start: 2019-04-07 | End: 2019-04-07 | Stop reason: SDUPTHER

## 2019-04-07 RX ORDER — 0.9 % SODIUM CHLORIDE 0.9 %
250 INTRAVENOUS SOLUTION INTRAVENOUS ONCE
Status: DISCONTINUED | OUTPATIENT
Start: 2019-04-07 | End: 2019-04-07

## 2019-04-07 RX ORDER — FENTANYL CITRATE 50 UG/ML
INJECTION, SOLUTION INTRAMUSCULAR; INTRAVENOUS PRN
Status: DISCONTINUED | OUTPATIENT
Start: 2019-04-07 | End: 2019-04-07 | Stop reason: SDUPTHER

## 2019-04-07 RX ORDER — MIDAZOLAM HYDROCHLORIDE 1 MG/ML
INJECTION INTRAMUSCULAR; INTRAVENOUS PRN
Status: DISCONTINUED | OUTPATIENT
Start: 2019-04-07 | End: 2019-04-07 | Stop reason: SDUPTHER

## 2019-04-07 RX ORDER — PROPOFOL 10 MG/ML
10 INJECTION, EMULSION INTRAVENOUS
Status: DISCONTINUED | OUTPATIENT
Start: 2019-04-07 | End: 2019-04-09

## 2019-04-07 RX ORDER — ASPIRIN 81 MG/1
81 TABLET, CHEWABLE ORAL DAILY
Status: DISCONTINUED | OUTPATIENT
Start: 2019-04-07 | End: 2019-04-07 | Stop reason: SDUPTHER

## 2019-04-07 RX ORDER — AMIODARONE HYDROCHLORIDE 200 MG/1
200 TABLET ORAL 2 TIMES DAILY
Status: DISCONTINUED | OUTPATIENT
Start: 2019-04-07 | End: 2019-04-14

## 2019-04-07 RX ORDER — POTASSIUM CHLORIDE 29.8 MG/ML
20 INJECTION INTRAVENOUS PRN
Status: DISCONTINUED | OUTPATIENT
Start: 2019-04-07 | End: 2019-04-25 | Stop reason: HOSPADM

## 2019-04-07 RX ORDER — CALCIUM CHLORIDE 100 MG/ML
INJECTION INTRAVENOUS; INTRAVENTRICULAR PRN
Status: DISCONTINUED | OUTPATIENT
Start: 2019-04-07 | End: 2019-04-07 | Stop reason: SDUPTHER

## 2019-04-07 RX ORDER — SODIUM CHLORIDE 0.9 % (FLUSH) 0.9 %
10 SYRINGE (ML) INJECTION EVERY 12 HOURS SCHEDULED
Status: DISCONTINUED | OUTPATIENT
Start: 2019-04-07 | End: 2019-04-25 | Stop reason: HOSPADM

## 2019-04-07 RX ORDER — NICOTINE POLACRILEX 4 MG
15 LOZENGE BUCCAL PRN
Status: DISCONTINUED | OUTPATIENT
Start: 2019-04-07 | End: 2019-04-07

## 2019-04-07 RX ORDER — POLYETHYLENE GLYCOL 3350 17 G/17G
17 POWDER, FOR SOLUTION ORAL DAILY
Status: DISCONTINUED | OUTPATIENT
Start: 2019-04-08 | End: 2019-04-25 | Stop reason: HOSPADM

## 2019-04-07 RX ORDER — DEXTROSE MONOHYDRATE 25 G/50ML
12.5 INJECTION, SOLUTION INTRAVENOUS PRN
Status: DISCONTINUED | OUTPATIENT
Start: 2019-04-07 | End: 2019-04-25 | Stop reason: HOSPADM

## 2019-04-07 RX ORDER — MAGNESIUM SULFATE 1 G/100ML
1 INJECTION INTRAVENOUS PRN
Status: DISCONTINUED | OUTPATIENT
Start: 2019-04-07 | End: 2019-04-25 | Stop reason: HOSPADM

## 2019-04-07 RX ORDER — NICOTINE POLACRILEX 4 MG
15 LOZENGE BUCCAL PRN
Status: DISCONTINUED | OUTPATIENT
Start: 2019-04-07 | End: 2019-04-25 | Stop reason: HOSPADM

## 2019-04-07 RX ORDER — NITROGLYCERIN 20 MG/100ML
INJECTION INTRAVENOUS PRN
Status: DISCONTINUED | OUTPATIENT
Start: 2019-04-07 | End: 2019-04-07 | Stop reason: SDUPTHER

## 2019-04-07 RX ORDER — DEXTROSE MONOHYDRATE 25 G/50ML
12.5 INJECTION, SOLUTION INTRAVENOUS PRN
Status: DISCONTINUED | OUTPATIENT
Start: 2019-04-07 | End: 2019-04-07

## 2019-04-07 RX ORDER — ONDANSETRON 2 MG/ML
4 INJECTION INTRAMUSCULAR; INTRAVENOUS EVERY 8 HOURS PRN
Status: DISCONTINUED | OUTPATIENT
Start: 2019-04-07 | End: 2019-04-25 | Stop reason: HOSPADM

## 2019-04-07 RX ORDER — DOCUSATE SODIUM 100 MG/1
100 CAPSULE, LIQUID FILLED ORAL 2 TIMES DAILY
Status: DISCONTINUED | OUTPATIENT
Start: 2019-04-07 | End: 2019-04-11

## 2019-04-07 RX ORDER — BISACODYL 10 MG
10 SUPPOSITORY, RECTAL RECTAL DAILY PRN
Status: DISCONTINUED | OUTPATIENT
Start: 2019-04-07 | End: 2019-04-25 | Stop reason: HOSPADM

## 2019-04-07 RX ORDER — ACETAMINOPHEN 650 MG/1
650 SUPPOSITORY RECTAL EVERY 4 HOURS PRN
Status: DISCONTINUED | OUTPATIENT
Start: 2019-04-07 | End: 2019-04-25 | Stop reason: HOSPADM

## 2019-04-07 RX ORDER — HYDRALAZINE HYDROCHLORIDE 20 MG/ML
5 INJECTION INTRAMUSCULAR; INTRAVENOUS EVERY 5 MIN PRN
Status: DISCONTINUED | OUTPATIENT
Start: 2019-04-07 | End: 2019-04-25 | Stop reason: HOSPADM

## 2019-04-07 RX ORDER — MEPERIDINE HYDROCHLORIDE 50 MG/ML
25 INJECTION INTRAMUSCULAR; INTRAVENOUS; SUBCUTANEOUS
Status: ACTIVE | OUTPATIENT
Start: 2019-04-07 | End: 2019-04-07

## 2019-04-07 RX ORDER — CHLORHEXIDINE GLUCONATE 0.12 MG/ML
15 RINSE ORAL 2 TIMES DAILY
Status: DISCONTINUED | OUTPATIENT
Start: 2019-04-07 | End: 2019-04-25 | Stop reason: HOSPADM

## 2019-04-07 RX ORDER — ASPIRIN 81 MG/1
81 TABLET ORAL DAILY
Status: DISCONTINUED | OUTPATIENT
Start: 2019-04-07 | End: 2019-04-11

## 2019-04-07 RX ORDER — OXYCODONE HYDROCHLORIDE AND ACETAMINOPHEN 5; 325 MG/1; MG/1
2 TABLET ORAL EVERY 4 HOURS PRN
Status: DISCONTINUED | OUTPATIENT
Start: 2019-04-07 | End: 2019-04-25 | Stop reason: HOSPADM

## 2019-04-07 RX ORDER — PROTAMINE SULFATE 10 MG/ML
INJECTION, SOLUTION INTRAVENOUS PRN
Status: DISCONTINUED | OUTPATIENT
Start: 2019-04-07 | End: 2019-04-07 | Stop reason: SDUPTHER

## 2019-04-07 RX ORDER — LISINOPRIL 2.5 MG/1
2.5 TABLET ORAL DAILY
Status: DISCONTINUED | OUTPATIENT
Start: 2019-04-08 | End: 2019-04-11

## 2019-04-07 RX ORDER — FENTANYL CITRATE 50 UG/ML
25 INJECTION, SOLUTION INTRAMUSCULAR; INTRAVENOUS
Status: DISCONTINUED | OUTPATIENT
Start: 2019-04-07 | End: 2019-04-15

## 2019-04-07 RX ORDER — FENTANYL CITRATE 50 UG/ML
50 INJECTION, SOLUTION INTRAMUSCULAR; INTRAVENOUS
Status: DISCONTINUED | OUTPATIENT
Start: 2019-04-07 | End: 2019-04-15

## 2019-04-07 RX ORDER — HEPARIN SODIUM 1000 [USP'U]/ML
60 INJECTION, SOLUTION INTRAVENOUS; SUBCUTANEOUS PRN
Status: DISCONTINUED | OUTPATIENT
Start: 2019-04-07 | End: 2019-04-07

## 2019-04-07 RX ORDER — FAMOTIDINE 20 MG/1
20 TABLET, FILM COATED ORAL 2 TIMES DAILY
Status: DISCONTINUED | OUTPATIENT
Start: 2019-04-09 | End: 2019-04-12

## 2019-04-07 RX ORDER — SODIUM CHLORIDE, SODIUM LACTATE, POTASSIUM CHLORIDE, CALCIUM CHLORIDE 600; 310; 30; 20 MG/100ML; MG/100ML; MG/100ML; MG/100ML
INJECTION, SOLUTION INTRAVENOUS CONTINUOUS PRN
Status: DISCONTINUED | OUTPATIENT
Start: 2019-04-07 | End: 2019-04-07 | Stop reason: SDUPTHER

## 2019-04-07 RX ORDER — DEXTROSE MONOHYDRATE 50 MG/ML
100 INJECTION, SOLUTION INTRAVENOUS PRN
Status: DISCONTINUED | OUTPATIENT
Start: 2019-04-07 | End: 2019-04-07

## 2019-04-07 RX ORDER — ALBUMIN, HUMAN INJ 5% 5 %
25 SOLUTION INTRAVENOUS PRN
Status: DISCONTINUED | OUTPATIENT
Start: 2019-04-07 | End: 2019-04-25 | Stop reason: HOSPADM

## 2019-04-07 RX ORDER — CLOPIDOGREL BISULFATE 75 MG/1
75 TABLET ORAL DAILY
Status: DISCONTINUED | OUTPATIENT
Start: 2019-04-08 | End: 2019-04-25 | Stop reason: HOSPADM

## 2019-04-07 RX ORDER — MIDAZOLAM HYDROCHLORIDE 1 MG/ML
INJECTION INTRAMUSCULAR; INTRAVENOUS PRN
Status: DISCONTINUED | OUTPATIENT
Start: 2019-04-07 | End: 2019-04-07

## 2019-04-07 RX ORDER — HEPARIN SODIUM 1000 [USP'U]/ML
INJECTION, SOLUTION INTRAVENOUS; SUBCUTANEOUS PRN
Status: DISCONTINUED | OUTPATIENT
Start: 2019-04-07 | End: 2019-04-07 | Stop reason: SDUPTHER

## 2019-04-07 RX ORDER — HEPARIN SODIUM 1000 [USP'U]/ML
30 INJECTION, SOLUTION INTRAVENOUS; SUBCUTANEOUS PRN
Status: DISCONTINUED | OUTPATIENT
Start: 2019-04-07 | End: 2019-04-07

## 2019-04-07 RX ORDER — ROCURONIUM BROMIDE 10 MG/ML
INJECTION, SOLUTION INTRAVENOUS PRN
Status: DISCONTINUED | OUTPATIENT
Start: 2019-04-07 | End: 2019-04-07 | Stop reason: SDUPTHER

## 2019-04-07 RX ADMIN — SODIUM CHLORIDE, POTASSIUM CHLORIDE, SODIUM LACTATE AND CALCIUM CHLORIDE: 600; 310; 30; 20 INJECTION, SOLUTION INTRAVENOUS at 07:09

## 2019-04-07 RX ADMIN — HEPARIN SODIUM 10000 UNITS: 1000 INJECTION INTRAVENOUS; SUBCUTANEOUS at 07:27

## 2019-04-07 RX ADMIN — FAMOTIDINE 20 MG: 10 INJECTION, SOLUTION INTRAVENOUS at 15:04

## 2019-04-07 RX ADMIN — POTASSIUM CHLORIDE 20 MEQ: 29.8 INJECTION, SOLUTION INTRAVENOUS at 12:41

## 2019-04-07 RX ADMIN — SODIUM CHLORIDE 1.6 UNITS/HR: 9 INJECTION, SOLUTION INTRAVENOUS at 23:39

## 2019-04-07 RX ADMIN — MUPIROCIN: 20 OINTMENT TOPICAL at 22:09

## 2019-04-07 RX ADMIN — SODIUM BICARBONATE 50 MEQ: 84 INJECTION INTRAVENOUS at 07:40

## 2019-04-07 RX ADMIN — INSULIN HUMAN 5 UNITS: 100 INJECTION, SOLUTION PARENTERAL at 07:45

## 2019-04-07 RX ADMIN — ALBUMIN (HUMAN) 250 ML: 12.5 SOLUTION INTRAVENOUS at 10:44

## 2019-04-07 RX ADMIN — AMIODARONE HYDROCHLORIDE 200 MG: 200 TABLET ORAL at 15:04

## 2019-04-07 RX ADMIN — SODIUM BICARBONATE 50 MEQ: 84 INJECTION INTRAVENOUS at 07:27

## 2019-04-07 RX ADMIN — SODIUM CHLORIDE 10 UNITS/HR: 9 INJECTION, SOLUTION INTRAVENOUS at 07:46

## 2019-04-07 RX ADMIN — HEPARIN SODIUM 4000 UNITS: 1000 INJECTION INTRAVENOUS; SUBCUTANEOUS at 05:01

## 2019-04-07 RX ADMIN — EPINEPHRINE 1 MG: 0.1 INJECTION, SOLUTION ENDOTRACHEAL; INTRACARDIAC; INTRAVENOUS at 07:40

## 2019-04-07 RX ADMIN — AMINOCAPROIC ACID 5 G/HR: 250 INJECTION, SOLUTION INTRAVENOUS at 08:00

## 2019-04-07 RX ADMIN — EPINEPHRINE 1 MG: 0.1 INJECTION, SOLUTION ENDOTRACHEAL; INTRACARDIAC; INTRAVENOUS at 07:32

## 2019-04-07 RX ADMIN — SODIUM CHLORIDE 2 MCG/KG/MIN: 9 INJECTION, SOLUTION INTRAVENOUS at 12:29

## 2019-04-07 RX ADMIN — ROCURONIUM BROMIDE 50 MG: 10 INJECTION INTRAVENOUS at 11:33

## 2019-04-07 RX ADMIN — PHENYLEPHRINE HYDROCHLORIDE 50 MCG/MIN: 10 INJECTION INTRAVENOUS at 10:21

## 2019-04-07 RX ADMIN — EPINEPHRINE 1 MG: 0.1 INJECTION, SOLUTION ENDOTRACHEAL; INTRACARDIAC; INTRAVENOUS at 07:19

## 2019-04-07 RX ADMIN — POTASSIUM CHLORIDE 30 MEQ: 29.8 INJECTION, SOLUTION INTRAVENOUS at 20:33

## 2019-04-07 RX ADMIN — VANCOMYCIN HYDROCHLORIDE 1000 MG: 1 INJECTION, SOLUTION INTRAVENOUS at 19:56

## 2019-04-07 RX ADMIN — PHENYLEPHRINE HYDROCHLORIDE 200 MCG: 10 INJECTION INTRAVENOUS at 07:30

## 2019-04-07 RX ADMIN — EPINEPHRINE 0.2 MCG/KG/MIN: 1 INJECTION PARENTERAL at 07:29

## 2019-04-07 RX ADMIN — PHENYLEPHRINE HYDROCHLORIDE 100 MCG: 10 INJECTION INTRAVENOUS at 10:43

## 2019-04-07 RX ADMIN — NITROGLYCERIN 80 MCG: 20 INJECTION INTRAVENOUS at 07:34

## 2019-04-07 RX ADMIN — EPINEPHRINE 1 MG: 0.1 INJECTION, SOLUTION ENDOTRACHEAL; INTRACARDIAC; INTRAVENOUS at 07:21

## 2019-04-07 RX ADMIN — AMIODARONE HYDROCHLORIDE 1 MG/MIN: 50 INJECTION, SOLUTION INTRAVENOUS at 21:53

## 2019-04-07 RX ADMIN — PROPOFOL 45 MCG/KG/MIN: 10 INJECTION, EMULSION INTRAVENOUS at 16:28

## 2019-04-07 RX ADMIN — ROCURONIUM BROMIDE 50 MG: 10 INJECTION INTRAVENOUS at 10:35

## 2019-04-07 RX ADMIN — SODIUM BICARBONATE 50 MEQ: 84 INJECTION INTRAVENOUS at 07:39

## 2019-04-07 RX ADMIN — CALCIUM CHLORIDE 1 G: 100 INJECTION, SOLUTION INTRAVENOUS; INTRAVENTRICULAR at 07:21

## 2019-04-07 RX ADMIN — FAMOTIDINE 20 MG: 10 INJECTION, SOLUTION INTRAVENOUS at 19:56

## 2019-04-07 RX ADMIN — AMIODARONE HYDROCHLORIDE 200 MG: 200 TABLET ORAL at 19:56

## 2019-04-07 RX ADMIN — PROPOFOL 30 MCG/KG/MIN: 10 INJECTION, EMULSION INTRAVENOUS at 12:26

## 2019-04-07 RX ADMIN — PROTAMINE SULFATE 25 MG: 10 INJECTION, SOLUTION INTRAVENOUS at 10:39

## 2019-04-07 RX ADMIN — ASPIRIN 81 MG: 81 TABLET ORAL at 15:05

## 2019-04-07 RX ADMIN — Medication 15 ML: at 22:09

## 2019-04-07 RX ADMIN — MIDAZOLAM HYDROCHLORIDE 2 MG: 1 INJECTION, SOLUTION INTRAMUSCULAR; INTRAVENOUS at 07:36

## 2019-04-07 RX ADMIN — SODIUM CHLORIDE, POTASSIUM CHLORIDE, SODIUM LACTATE AND CALCIUM CHLORIDE: 600; 310; 30; 20 INJECTION, SOLUTION INTRAVENOUS at 07:36

## 2019-04-07 RX ADMIN — POTASSIUM CHLORIDE 20 MEQ: 29.8 INJECTION, SOLUTION INTRAVENOUS at 13:41

## 2019-04-07 RX ADMIN — SODIUM CHLORIDE: 9 INJECTION, SOLUTION INTRAVENOUS at 11:45

## 2019-04-07 RX ADMIN — POTASSIUM CHLORIDE 20 MEQ: 29.8 INJECTION, SOLUTION INTRAVENOUS at 23:47

## 2019-04-07 RX ADMIN — OXYCODONE HYDROCHLORIDE AND ACETAMINOPHEN 2 TABLET: 5; 325 TABLET ORAL at 17:28

## 2019-04-07 RX ADMIN — FENTANYL CITRATE 100 MCG: 50 INJECTION, SOLUTION INTRAMUSCULAR; INTRAVENOUS at 11:46

## 2019-04-07 RX ADMIN — SODIUM CHLORIDE: 900 INJECTION, SOLUTION INTRAVENOUS at 07:26

## 2019-04-07 RX ADMIN — FENTANYL CITRATE 100 MCG: 50 INJECTION, SOLUTION INTRAMUSCULAR; INTRAVENOUS at 10:35

## 2019-04-07 RX ADMIN — Medication 0.15 MCG/KG/MIN: at 07:30

## 2019-04-07 RX ADMIN — PHENYLEPHRINE HYDROCHLORIDE 200 MCG: 10 INJECTION INTRAVENOUS at 07:25

## 2019-04-07 RX ADMIN — PROTAMINE SULFATE 225 MG: 10 INJECTION, SOLUTION INTRAVENOUS at 10:41

## 2019-04-07 RX ADMIN — AMIODARONE HYDROCHLORIDE 150 MG: 50 INJECTION, SOLUTION INTRAVENOUS at 21:42

## 2019-04-07 RX ADMIN — PROPOFOL 35 MCG/KG/MIN: 10 INJECTION, EMULSION INTRAVENOUS at 21:29

## 2019-04-07 RX ADMIN — OXYCODONE HYDROCHLORIDE AND ACETAMINOPHEN 2 TABLET: 5; 325 TABLET ORAL at 21:37

## 2019-04-07 RX ADMIN — ALBUMIN (HUMAN) 25 G: 12.5 INJECTION, SOLUTION INTRAVENOUS at 12:13

## 2019-04-07 RX ADMIN — HEPARIN SODIUM 30000 UNITS: 1000 INJECTION INTRAVENOUS; SUBCUTANEOUS at 07:24

## 2019-04-07 RX ADMIN — MIDAZOLAM HYDROCHLORIDE 2 MG: 1 INJECTION, SOLUTION INTRAMUSCULAR; INTRAVENOUS at 10:35

## 2019-04-07 RX ADMIN — SODIUM BICARBONATE 50 MEQ: 84 INJECTION INTRAVENOUS at 07:37

## 2019-04-07 RX ADMIN — VANCOMYCIN HYDROCHLORIDE 1 G: 1 INJECTION, SOLUTION INTRAVENOUS at 07:53

## 2019-04-07 RX ADMIN — SODIUM BICARBONATE 50 MEQ: 84 INJECTION INTRAVENOUS at 07:23

## 2019-04-07 RX ADMIN — CALCIUM CHLORIDE 1 G: 100 INJECTION INTRAVENOUS; INTRAVENTRICULAR at 21:06

## 2019-04-07 ASSESSMENT — PULMONARY FUNCTION TESTS
PIF_VALUE: 35
PIF_VALUE: 31
PIF_VALUE: 1
PIF_VALUE: 35
PIF_VALUE: 35
PIF_VALUE: 1
PIF_VALUE: 35
PIF_VALUE: 1
PIF_VALUE: 1
PIF_VALUE: 35
PIF_VALUE: 27
PIF_VALUE: 0
PIF_VALUE: 0
PIF_VALUE: 23
PIF_VALUE: 35
PIF_VALUE: 31
PIF_VALUE: 0
PIF_VALUE: 35
PIF_VALUE: 35
PIF_VALUE: 30
PIF_VALUE: 35
PIF_VALUE: 35
PIF_VALUE: 0
PIF_VALUE: 1
PIF_VALUE: 23
PIF_VALUE: 2
PIF_VALUE: 35
PIF_VALUE: 35
PIF_VALUE: 0
PIF_VALUE: 35
PIF_VALUE: 0
PIF_VALUE: 1
PIF_VALUE: 0
PIF_VALUE: 0
PIF_VALUE: 1
PIF_VALUE: 0
PIF_VALUE: 35
PIF_VALUE: 0
PIF_VALUE: 0
PIF_VALUE: 35
PIF_VALUE: 20
PIF_VALUE: 35
PIF_VALUE: 25
PIF_VALUE: 35
PIF_VALUE: 0
PIF_VALUE: 35
PIF_VALUE: 0
PIF_VALUE: 35
PIF_VALUE: 0
PIF_VALUE: 27
PIF_VALUE: 35
PIF_VALUE: 0
PIF_VALUE: 35
PIF_VALUE: 34
PIF_VALUE: 35
PIF_VALUE: 0
PIF_VALUE: 35
PIF_VALUE: 25
PIF_VALUE: 0
PIF_VALUE: 24
PIF_VALUE: 35
PIF_VALUE: 0
PIF_VALUE: 27
PIF_VALUE: 24
PIF_VALUE: 35
PIF_VALUE: 3
PIF_VALUE: 2
PIF_VALUE: 35
PIF_VALUE: 37
PIF_VALUE: 1
PIF_VALUE: 0
PIF_VALUE: 0
PIF_VALUE: 35
PIF_VALUE: 0
PIF_VALUE: 24
PIF_VALUE: 35
PIF_VALUE: 35
PIF_VALUE: 0
PIF_VALUE: 29
PIF_VALUE: 35
PIF_VALUE: 28
PIF_VALUE: 1
PIF_VALUE: 35
PIF_VALUE: 33
PIF_VALUE: 1
PIF_VALUE: 35
PIF_VALUE: 0
PIF_VALUE: 35
PIF_VALUE: 1
PIF_VALUE: 35
PIF_VALUE: 5
PIF_VALUE: 24
PIF_VALUE: 1
PIF_VALUE: 1
PIF_VALUE: 35
PIF_VALUE: 35
PIF_VALUE: 0
PIF_VALUE: 35
PIF_VALUE: 28
PIF_VALUE: 9
PIF_VALUE: 35
PIF_VALUE: 0
PIF_VALUE: 0
PIF_VALUE: 35
PIF_VALUE: 35
PIF_VALUE: 20
PIF_VALUE: 30
PIF_VALUE: 35
PIF_VALUE: 0
PIF_VALUE: 35
PIF_VALUE: 0
PIF_VALUE: 24
PIF_VALUE: 35
PIF_VALUE: 1
PIF_VALUE: 35
PIF_VALUE: 24
PIF_VALUE: 35
PIF_VALUE: 35
PIF_VALUE: 0
PIF_VALUE: 0
PIF_VALUE: 35
PIF_VALUE: 35
PIF_VALUE: 3
PIF_VALUE: 0
PIF_VALUE: 0
PIF_VALUE: 35
PIF_VALUE: 0
PIF_VALUE: 35
PIF_VALUE: 23
PIF_VALUE: 1
PIF_VALUE: 36
PIF_VALUE: 35
PIF_VALUE: 35
PIF_VALUE: 0
PIF_VALUE: 1
PIF_VALUE: 35
PIF_VALUE: 0
PIF_VALUE: 1
PIF_VALUE: 35
PIF_VALUE: 1
PIF_VALUE: 35
PIF_VALUE: 0
PIF_VALUE: 0
PIF_VALUE: 35
PIF_VALUE: 0
PIF_VALUE: 35
PIF_VALUE: 0
PIF_VALUE: 0
PIF_VALUE: 35
PIF_VALUE: 31
PIF_VALUE: 35
PIF_VALUE: 0
PIF_VALUE: 1
PIF_VALUE: 35
PIF_VALUE: 0
PIF_VALUE: 0
PIF_VALUE: 17
PIF_VALUE: 1
PIF_VALUE: 1
PIF_VALUE: 28
PIF_VALUE: 35
PIF_VALUE: 26
PIF_VALUE: 35
PIF_VALUE: 1
PIF_VALUE: 35
PIF_VALUE: 1
PIF_VALUE: 0
PIF_VALUE: 24
PIF_VALUE: 0
PIF_VALUE: 35
PIF_VALUE: 0
PIF_VALUE: 1
PIF_VALUE: 0
PIF_VALUE: 0
PIF_VALUE: 35
PIF_VALUE: 0
PIF_VALUE: 35
PIF_VALUE: 0
PIF_VALUE: 35
PIF_VALUE: 24
PIF_VALUE: 35
PIF_VALUE: 0
PIF_VALUE: 35
PIF_VALUE: 1
PIF_VALUE: 35
PIF_VALUE: 0
PIF_VALUE: 35
PIF_VALUE: 35
PIF_VALUE: 2
PIF_VALUE: 35
PIF_VALUE: 0
PIF_VALUE: 35
PIF_VALUE: 35
PIF_VALUE: 0
PIF_VALUE: 35
PIF_VALUE: 0
PIF_VALUE: 35
PIF_VALUE: 35
PIF_VALUE: 1
PIF_VALUE: 1
PIF_VALUE: 35
PIF_VALUE: 35
PIF_VALUE: 1
PIF_VALUE: 9
PIF_VALUE: 35
PIF_VALUE: 1
PIF_VALUE: 27
PIF_VALUE: 35
PIF_VALUE: 0
PIF_VALUE: 1
PIF_VALUE: 35
PIF_VALUE: 29
PIF_VALUE: 39
PIF_VALUE: 24
PIF_VALUE: 0
PIF_VALUE: 35
PIF_VALUE: 35
PIF_VALUE: 20
PIF_VALUE: 35
PIF_VALUE: 31
PIF_VALUE: 25
PIF_VALUE: 35

## 2019-04-07 NOTE — PLAN OF CARE
Problem: OXYGENATION/RESPIRATORY FUNCTION  Goal: Patient will maintain patent airway  Outcome: Ongoing  Goal: Patient will achieve/maintain normal respiratory rate/effort  Description  Respiratory rate and effort will be within normal limits for the patient  Outcome: Ongoing     Problem: MECHANICAL VENTILATION  Goal: Patient will maintain patent airway  Outcome: Ongoing  Goal: Oral health is maintained or improved  Outcome: Ongoing  Goal: ET tube will be managed safely  Outcome: Ongoing  Goal: Ability to express needs and understand communication  Outcome: Ongoing  Goal: Mobility/activity is maintained at optimum level for patient  Outcome: Ongoing

## 2019-04-07 NOTE — ED PROVIDER NOTES
[facility]     Emergency Department     Faculty Note/ Attestation      Pt Name: Leatha Reid                                       MRN: [id number]  [place] 1965  Date of evaluation: 4/7/2019    Patients PCP:    No primary care provider on file. Attestation  I performed a history and physical examination of the patient and discussed management with the resident. I reviewed the residents note and agree with the documented findings and plan of care. Any areas of disagreement are noted on the chart. I was personally present for the key portions of any procedures. I have documented in the chart those procedures where I was not present during the key portions. I have reviewed the emergency nurses triage note. I agree with the chief complaint, past medical history, past surgical history, allergies, medications, social and family history as documented unless otherwise noted below. For Physician Assistant/ Nurse Practitioner cases/documentation I have personally evaluated this patient and have completed at least one if not all key elements of the E/M (history, physical exam, and MDM). Additional findings are as noted.       Initial Screens:             Vitals:    Vitals:    04/07/19 0448   BP: (!) 222/137   Pulse: 144   Resp: 23   Temp: 97.6 °F (36.4 °C)   TempSrc: Oral   SpO2: 100%       CHIEF COMPLAINT       Chief Complaint   Patient presents with    Cardiac Arrest     diff breather, brought in on cpap with elevations in EKG on EMS arrival.              DIAGNOSTIC RESULTS             RADIOLOGY:   XR CHEST PORTABLE    (Results Pending)         LABS:  Labs Reviewed   APTT   APTT   TROPONIN   CBC WITH AUTO DIFFERENTIAL   BASIC METABOLIC PANEL   BRAIN NATRIURETIC PEPTIDE   APTT         EMERGENCY DEPARTMENT COURSE:     -------------------------  BP: (!) 222/137, Temp: 97.6 °F (36.4 °C), Pulse: 144, Resp: 23      Comments    58-year-old male with history of congestive heart failure called 911 for 2 days of feeling unwell and 20 minutes of shortness of breath. EMTs to an EKG that appeared to be ST elevation, placed him on CPAP, given nitro and aspirin, also given Versed to tolerate the CPAP. He was apparently initially awake and talking however blood pressure was unobtainable. As they rolled into the ED, they noted he normally had a pulse. CPR was initiated, after several rounds of high quality CPR and 2 doses of epinephrine, and intubation patient had return of spontaneous circulation. EKG showed again showed ST elevation. Initial EKG was faxed prehospital to Dr. Pamela Frances from cardiology, Cath Lab was activated after Nahid Ramirez, Dr. Pamela Frances was notified again and EKG faxed. Heparin started, Levothroid available at bedside, Versed drip initiated. Art line placed, patient had good return of color, palpable radial pulses, do not require further vasoactive medications. Taken directly to Cath Lab    CRITICAL CARE: There was a high probability of clinically significant/life threatening deterioration in this patient's condition which required my urgent intervention. Total critical care time was 30 minutes. This excludes any time for separately reportable procedures.        (Please note that portions of this note were completed with a voice recognition program.  Efforts were made to edit the dictations but occasionally words are mis-transcribed.)      Hernández MD  Attending Emergency Physician          Marla See MD  04/07/19 6002

## 2019-04-07 NOTE — CONSULTS
the Rt  Tibial plateau. The screw has been removed and sent for culture. X rays show healed fracture of the proximal tibia. There is additional hardware in place that will likely need to be removed if patient survives. Cultures:  Urine:  ·   Blood:  ·   Sputum :  ·   Wound:  ·     Discussed with RN. I have personally reviewed the past medical history, past surgical history, medications, social history, and family history, and I have updated the database accordingly. Past Medical History:   No past medical history on file. Past Surgical  History:   No past surgical history on file.     Medications:      sodium chloride flush  10 mL Intravenous 2 times per day    sodium chloride flush  10 mL Intravenous 2 times per day    docusate sodium  100 mg Oral BID    [START ON 4/8/2019] polyethylene glycol  17 g Oral Daily    [START ON 4/9/2019] famotidine  20 mg Oral BID    famotidine (PEPCID) injection  20 mg Intravenous BID    [START ON 4/8/2019] metoprolol tartrate  25 mg Oral BID    [START ON 4/8/2019] lisinopril  2.5 mg Oral Daily    amiodarone  200 mg Oral BID    chlorhexidine  15 mL Mouth/Throat BID    mupirocin   Nasal BID    [START ON 4/8/2019] therapeutic multivitamin-minerals  1 tablet Oral Daily with breakfast    [START ON 4/8/2019] atorvastatin  40 mg Oral Nightly    [START ON 4/8/2019] enoxaparin  40 mg Subcutaneous Daily    aspirin  81 mg Oral Daily    [START ON 4/8/2019] clopidogrel  75 mg Oral Daily    insulin lispro  0-12 Units Subcutaneous TID WC    insulin lispro  0-6 Units Subcutaneous Nightly    vancomycin (VANCOCIN) IV  1,000 mg Intravenous Q12H       Social History:     Social History     Socioeconomic History    Marital status: Unknown     Spouse name: Not on file    Number of children: Not on file    Years of education: Not on file    Highest education level: Not on file   Occupational History    Not on file   Social Needs    Financial resource strain: Not on file    Food insecurity:     Worry: Not on file     Inability: Not on file    Transportation needs:     Medical: Not on file     Non-medical: Not on file   Tobacco Use    Smoking status: Not on file   Substance and Sexual Activity    Alcohol use: Not on file    Drug use: Not on file    Sexual activity: Not on file   Lifestyle    Physical activity:     Days per week: Not on file     Minutes per session: Not on file    Stress: Not on file   Relationships    Social connections:     Talks on phone: Not on file     Gets together: Not on file     Attends Christianity service: Not on file     Active member of club or organization: Not on file     Attends meetings of clubs or organizations: Not on file     Relationship status: Not on file    Intimate partner violence:     Fear of current or ex partner: Not on file     Emotionally abused: Not on file     Physically abused: Not on file     Forced sexual activity: Not on file   Other Topics Concern    Not on file   Social History Narrative    Not on file       Family History:   No family history on file. Allergies:   Patient has no allergy information on record. Review of Systems:   Unable to provide. Sedated on ventilator.     Physical Examination :     Patient Vitals for the past 8 hrs:   BP Pulse SpO2 Height Weight   04/07/19 1715 -- 100 98 % -- --   04/07/19 1700 (!) 91/55 101 98 % -- --   04/07/19 1645 -- 102 98 % -- --   04/07/19 1630 -- 102 99 % -- --   04/07/19 1615 -- 101 100 % -- --   04/07/19 1600 (!) 92/59 104 98 % -- --   04/07/19 1545 -- 105 98 % -- --   04/07/19 1530 -- 106 97 % -- --   04/07/19 1515 -- 107 100 % -- --   04/07/19 1513 -- 107 100 % -- --   04/07/19 1500 94/61 108 100 % -- --   04/07/19 1445 -- 109 99 % -- --   04/07/19 1430 -- 108 99 % -- --   04/07/19 1426 -- -- 100 % -- --   04/07/19 1415 -- 110 99 % -- --   04/07/19 1400 94/61 111 100 % -- --   04/07/19 1345 -- 111 99 % -- --   04/07/19 1330 -- 109 99 % -- --   04/07/19 1315 -- 109 100 % -- --   04/07/19 1300 -- 112 98 % -- --   04/07/19 1245 86/62 113 100 % -- --   04/07/19 1242 -- 114 100 % -- --   04/07/19 1230 -- 117 100 % -- --   04/07/19 1218 -- -- -- 5' 8\" (1.727 m) 169 lb 12.1 oz (77 kg)   04/07/19 1217 -- -- -- -- 176 lb 5.9 oz (80 kg)   04/07/19 1215 -- 121 100 % -- --   04/07/19 1200 -- 124 96 % -- --   04/07/19 1147 -- 126 97 % -- --     General Appearance: Awake, alert, and in no apparent distress  Head:  Normocephalic, no trauma  Eyes: Pupils equal, round, reactive to light; sclera anicteric; conjunctivae pink. No embolic phenomena. ENT: Oropharynx clear, without erythema, exudate, or thrush. No tenderness of sinuses. Mouth/throat: mucosa pink and moist. No lesions. Dentition in good repair. Neck:Supple, without lymphadenopathy. Thyroid normal, No bruits. Pulmonary/Chest: Coarse sounds  Cardiovascular: Regular rate and rhythm without murmurs, rubs, or gallops. Abdomen: Soft, non tender. Bowel sounds normal. No organomegaly  All four Extremities: No cyanosis, clubbing, edema, or effusions. Neurologic: Unable to assess. Sedated. Skin: Warm and dry with good turgor. Signs of peripheral arterial insufficiency. Rt lateral knee with open incision with old blood at site of prior protruding screw. Medical Decision Making -Laboratory:   I have independently reviewed/ordered the following labs:    CBC with Differential:   Recent Labs     04/07/19  0503 04/07/19  1205   WBC 17.5* 26.4*   HGB 15.7 12.8*   HCT 51.9* 39.4*    156   LYMPHOPCT 33  --    MONOPCT 1  --      BMP:   Recent Labs     04/07/19  0503  04/07/19  1038 04/07/19  1205   *  --   --  149*   K 3.3*  --   --  3.7   CL 95*  --   --  111*   CO2 15*  --   --  26   BUN 16  --   --  16   CREATININE 0.90   < > 0.90 0.65*   MG  --   --   --  2.6    < > = values in this interval not displayed.      Hepatic Function Panel: No results for input(s): PROT, LABALBU, BILIDIR, IBILI, BILITOT, ALKPHOS, ALT, AST in the last 72 hours. No results for input(s): RPR in the last 72 hours. No results for input(s): HIV in the last 72 hours. No results for input(s): BC in the last 72 hours. Lab Results   Component Value Date    RBC 4.16 04/07/2019    WBC 26.4 04/07/2019     Lab Results   Component Value Date    CREATININE 0.65 04/07/2019    GLUCOSE 155 04/07/2019       Medical Decision Making-Imaging:     EXAMINATION:   3 XRAY VIEWS OF THE RIGHT KNEE       4/7/2019 5:00 pm       COMPARISON:   04/07/2019       HISTORY:   ORDERING SYSTEM PROVIDED HISTORY: s/p Hardware removal at bedside   TECHNOLOGIST PROVIDED HISTORY:   s/p Hardware removal at bedside       FINDINGS:   Interval removal of anterior superior fixating screw.  No periprosthetic   fracture.  Chronic deformity of the proximal fibula.  Degenerative changes of   knee and tib-fib syndesmosis.         Impression   Removal of anterior-superior fixating screw without interval change otherwise     EXAMINATION:   3 XRAY VIEWS OF THE RIGHT KNEE       4/7/2019 3:28 pm       COMPARISON:   None.       HISTORY:   ORDERING SYSTEM PROVIDED HISTORY: Trauma/Fracture   TECHNOLOGIST PROVIDED HISTORY:   Trauma/Fracture       FINDINGS:   Sideplate and 4 screws transfix the proximal tibia laterally.  Old healed   fracture proximal fibular neck and head.  Spurring lateral tibia plateau. Proximal tibiofibular syndesmosis.  Visualized femur and patella normal.   Soft tissues unremarkable.           Impression   ORIF proximal tibia.  No acute fracture.           Medical Decision Making-Other: Thank you for allowing us to participate in the care of this patient. Please call with questions.     Ham López MD  Pager: [phone] - Office: [phone]

## 2019-04-07 NOTE — OP NOTE
stenosis  5. Occluded left external iliac artery  6. 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 Unable to pass IABP or Impella  7. Overall preserved LV function    Recommendations:  1. Medical Therapy. 2. Risk Factors Modification. 3. Post Cath Protocol  4. Urgent Surgical CABG. Discussed with CT surgery. Lord Ava MD  Fellow, [address]       I was present during entire procedure and performed all critical elements of the procedure.     Jordin Robin MD

## 2019-04-07 NOTE — PROGRESS NOTES
Pt with fixation in outer R extremity near knee. Pts family states that he fell from a roof several years ago, titanium rods were placed from hip to knee. He was seeing an orthopedic surgeon but never followed up. Family was not aware that pt still had fixation device in but that pt was complaining of pain in his R knee for quite some time. Ortho Surg consulted. Site with slight odor. Site cleansed with betadine.

## 2019-04-07 NOTE — ED NOTES
Pt arrived ED via [facility]. EMS called to scene for difficulty breathing. Pt states he had not been feeling well for the last few days. Pt given 125mg solumedrol, 1 nitro, 324 asa, 4mg zofran and versed by EMS prior to arrival. Pt arrived on CPAP. As EMS was rolling in the door pt lost pulses. CPR was initiated. Pt placed on cardiac monitor, EKG and Life pack placed.  Pt had bilateral 18g IV in a/cs on arrival.      Stewart Ray RN  04/07/19 9167

## 2019-04-07 NOTE — ED PROVIDER NOTES
[address] [facility]  Emergency Department Encounter  EmergencyMedicine Resident     Pt Marcelo Paredes  MRN: [id number]  [place] 1965  Date of evaluation: 4/7/19  PCP:  No primary care provider on file. CHIEF COMPLAINT       Chief Complaint   Patient presents with    Cardiac Arrest     diff breather, brought in on cpap with elevations in EKG on EMS arrival.        HISTORY OF PRESENT ILLNESS  (Location/Symptom, Timing/Onset, Context/Setting, Quality, Duration, Modifying Factors, Severity.)      Juanito Garcia is a 46 y.o. male who presents with cardiac arrest.  Patient presented as a possible STEMI. He called out EMS for worsening shortness of breath. Upon EMS arrival he had audible rales. He is placed on CPAP given breathing treatments and aspirin. He is also given Solu-Medrol. EMS transmitted EKGs for concern for a STEMI. Patient had ST elevations in V2, V3, V4 consistent with anterior septal STEMI. As EMS rolled in to the ER, patient lost pulses. He appeared herrera and ashen with vomit present in his mouth. EMS started compressions and patient was rolled into the room. He was placed on defibrillator. Initial pulse check showed a rhythm of PEA. He was given 1 mg of epinephrine and CPR was continued. Patient was intubated by myself with a 7-5 ET tube. End tidal was confirmed as well as bilateral breath sounds. On second pulse check, patient did not have a palpable pulse. CPR was continued and another milligram of epinephrine was given. The second postreduction again showed PEA. CPR was then continued until the third pulse check, with her pulse check, patient did have a palpable pulse. At bedside echocardiogram she'll globally decreased systolic contraction. Patient's blood pressure was then noted to be systolic in the 881R and a heart rate in the 140s. I contacted and talked with interventional cardiologist, Dr. Patti Bennett, and cath lab activated. Patient was given 324 ASA by EMS.   Then bolused with 4000 units heparin. Sedation was started. PAST MEDICAL / SURGICAL / SOCIAL / FAMILY HISTORY      has no past medical history on file. has no past surgical history on file. Social History     Socioeconomic History    Marital status: Unknown     Spouse name: Not on file    Number of children: Not on file    Years of education: Not on file    Highest education level: Not on file   Occupational History    Not on file   Social Needs    Financial resource strain: Not on file    Food insecurity:     Worry: Not on file     Inability: Not on file    Transportation needs:     Medical: Not on file     Non-medical: Not on file   Tobacco Use    Smoking status: Not on file   Substance and Sexual Activity    Alcohol use: Not on file    Drug use: Not on file    Sexual activity: Not on file   Lifestyle    Physical activity:     Days per week: Not on file     Minutes per session: Not on file    Stress: Not on file   Relationships    Social connections:     Talks on phone: Not on file     Gets together: Not on file     Attends Cheondoism service: Not on file     Active member of club or organization: Not on file     Attends meetings of clubs or organizations: Not on file     Relationship status: Not on file    Intimate partner violence:     Fear of current or ex partner: Not on file     Emotionally abused: Not on file     Physically abused: Not on file     Forced sexual activity: Not on file   Other Topics Concern    Not on file   Social History Narrative    Not on file       No family history on file. Allergies:  Patient has no allergy information on record.     Home Medications:  Prior to Admission medications    Not on File       REVIEW OF SYSTEMS    (2-9 systems for level 4, 10 or more for level 5)      Review of Systems   Unable to perform ROS: Patient unresponsive       PHYSICAL EXAM   (up to 7 for level 4, 8 or more for level 5)      INITIAL VITALS:   BP (!) 222/137   Pulse 144   Temp 97.6 °F (36.4 °C) (Oral)   Resp 23   SpO2 100%     Physical Exam   Constitutional: He appears toxic. He appears distressed. Backboard in place. HENT:   Head: Normocephalic and atraumatic. Eyes: Pupils are equal, round, and reactive to light. Right pupil is reactive. Left pupil is reactive. Neck: Normal range of motion. Pulmonary/Chest: He is in respiratory distress. He has rales. Pulmonary edema with frothing at the mouth   Abdominal: He exhibits distension. Neurological: He is unresponsive. He exhibits abnormal muscle tone. GCS eye subscore is 1. GCS verbal subscore is 1. GCS motor subscore is 1. Skin: Capillary refill takes more than 3 seconds. There is pallor. Nursing note and vitals reviewed.       DIFFERENTIAL  DIAGNOSIS     PLAN (LABS / IMAGING / EKG):  Orders Placed This Encounter   Procedures    XR CHEST PORTABLE    APTT    Troponin    CBC WITH AUTO DIFFERENTIAL    Basic Metabolic Panel    Brain Natriuretic Peptide    Hemoglobin and hematocrit, blood    SODIUM (POC)    POTASSIUM (POC)    CHLORIDE (POC)    CALCIUM, IONIC (POC)    Hemoglobin and hematocrit, blood    SODIUM (POC)    POTASSIUM (POC)    CHLORIDE (POC)    CALCIUM, IONIC (POC)    Inpatient consult to Critical Care    Arterial Blood Gas, POC    Creatinine W/GFR Point of Care    Lactic Acid, POC    POCT Glucose    Anion Gap (Calc) POC    Arterial Blood Gas, POC    Creatinine W/GFR Point of Care    Lactic Acid, POC    POCT Glucose    Anion Gap (Calc) POC    EKG 12 Lead    TYPE AND SCREEN    TYPE AND SCREEN    PREPARE RBC (CROSSMATCH), 2 Units    TYPE AND SCREEN    BLOOD BANK REQUEST    PATIENT STATUS (FROM ED OR OR/PROCEDURAL) Inpatient       MEDICATIONS ORDERED:  Orders Placed This Encounter   Medications    heparin (porcine) injection 4,000 Units    0.9 % sodium chloride bolus    sodium chloride 0.9 % irrigation    sodium chloride 0.9 % 100 mL with papaverine 60 mg    thrombin kit       DDX: STEMI, Cardiac Result Value Ref Range    POC Glucose 506 (HH) 74 - 100 mg/dL   Anion Gap (Calc) POC   Result Value Ref Range    Anion Gap 3 (L) 7 - 16 mmol/L   Arterial Blood Gas, POC   Result Value Ref Range    POC pH 7.245 (L) 7.350 - 7.450    POC pCO2 55.5 (H) 35.0 - 48.0 mm Hg    POC PO2 87.7 83.0 - 108.0 mm Hg    POC HCO3 24.1 21.0 - 28.0 mmol/L    TCO2 (calc), Art 26 22.0 - 29.0 mmol/L    Negative Base Excess, Art 4 (H) 0.0 - 2.0    Positive Base Excess, Art NOT REPORTED 0.0 - 3.0    POC O2 SAT 95 94.0 - 98.0 %    O2 Device/Flow/% NOT REPORTED     Sam Test NOT REPORTED     Sample Site NOT REPORTED     Mode NOT REPORTED     FIO2 NOT REPORTED     Pt Temp NOT REPORTED     POC pH Temp NOT REPORTED     POC pCO2 Temp NOT REPORTED mm Hg    POC pO2 Temp NOT REPORTED mm Hg   Creatinine W/GFR Point of Care   Result Value Ref Range    POC Creatinine 0.81 0.51 - 1.19 mg/dL    GFR Comment >60 >60 mL/min    GFR Non-African American >60 >60 mL/min    GFR Comment         Lactic Acid, POC   Result Value Ref Range    POC Lactic Acid 1.82 (H) 0.56 - 1.39 mmol/L   POCT Glucose   Result Value Ref Range    POC Glucose 495 (HH) 74 - 100 mg/dL   Anion Gap (Calc) POC   Result Value Ref Range    Anion Gap 6 (L) 7 - 16 mmol/L       RADIOLOGY:  Xr Chest Portable    Result Date: 4/7/2019  EXAMINATION: SINGLE XRAY VIEW OF THE CHEST 4/7/2019 5:14 am COMPARISON: None. HISTORY: ORDERING SYSTEM PROVIDED HISTORY: cardiac arrest TECHNOLOGIST PROVIDED HISTORY: cardiac arrest FINDINGS: Diffuse patchy bilateral airspace opacities. Endotracheal tube tip is about 5 cm above the ramu. Gastric tube tip projects over the proximal stomach. Costophrenic angles are clear. Cardiac and mediastinal structures are unremarkable. Diffuse patchy bilateral airspace opacities could be due to edema or multifocal pneumonia.        EKG  EKG Interpretation    Interpreted by me    Rhythm: normal sinus   Rate: tachycardia  Axis: normal  Ectopy: none  Conduction: normal  ST Segments: ST elevation in leads V1, V2, V3, V4 with ST depression and 1, aVL consistent with anter-septal STEMI  T Waves: no acute change  Q Waves: none    Clinical Impression: anterior-septal STEMI    All EKG's are interpreted by the Emergency Department Physician who either signs or Co-signs this chart in the absence of a cardiologist.    EMERGENCY DEPARTMENT COURSE:  4:49 AM: Dr. Juan Miguel Kitchen notified and will be taking patient to cath. Dr. Juan Miguel Kitchen requesting that I admit patient to MICU as he is currently intubated and supposed history of COPD.      5:00 AM: Discussed admission with Critical Care resident. PROCEDURES:  Intubation Procedure Note    Indication: Respiratory failure    Consent: Unable to be obtained due to the emergent nature of this procedure. Medications Used: None    Procedure: The patient was placed in the appropriate position. Cricoid pressure was utilized. Intubation was performed by direct laryngoscopy using a laryngoscope and a 7.5 cuffed endotracheal tube. The cuff was then inflated and the tube was secured appropriately at a distance of 21 cm to the dental ridge. Initial confirmation of placement included bilateral breath sounds, an end tidal CO2 detector and absence of sounds over the stomach. A chest x-ray to verify correct placement of the tube showed appropriate tube position. The patient tolerated the procedure well. Complications: None      CONSULTS:  IP CONSULT TO CRITICAL CARE  IP CONSULT TO CARDIOTHORACIC SURGERY    CRITICAL CARE:  20 minutes    FINAL IMPRESSION      1. Acute ST elevation myocardial infarction (STEMI) of anterior wall ([facility] [address]    2. Cardiac arrest ([facility] [address]          DISPOSITION / PLAN     DISPOSITION Admitted 04/07/2019 05:15:20 AM      PATIENT REFERRED TO:  No follow-up provider specified. DISCHARGE MEDICATIONS:  There are no discharge medications for this patient.       Reji Marte DO  Emergency Medicine Resident    (Please note that portions of thisnote

## 2019-04-07 NOTE — CONSULTS
Division of Vascular Surgery          Vascular Consult      Name: Todd Servin  MRN: [id number]       Physician Requesting Consult:  Dr. Corina Escamilla     Reason for Consult:   No pulses b/l LE    Chief Complaint:     Cardiac arrest    History of Present Illness:      Todd Servin is a 46 y.o.  male who presented via EMS for cardiac arrest with ROSC, went for cardiac cath found to have multiple poor vessels, taken to OR for emergent cabg, arrested 2nd time on OR table placed on bypass emergently. Had 4 vessel bypass. EF 10%. . GSV harvested from L. Sheath from cardiac cath in place in R Fem a. Chest left open. Vascular surgery consulted due to no pulses in b/l LE. On arrival patient on vasopressin and levophed. Intubated. No open wounds to lower extremity, has bolt hanging out of R proximal tibia from prior orthopedic surgery in remote past.     Past Medical History:     No past medical history on file. Past Surgical History:     No past surgical history on file. Medications Prior to Admission:       Prior to Admission medications    Not on File        Allergies:       Patient has no allergy information on record. Social History:     Tobacco:    has no tobacco history on file. Alcohol:      has no alcohol history on file. Drug Use:  has no drug history on file. Family History:     No family history on file.     Review of Systems:     Positive and Negative as described in HPI    Intubated and sedated s/p CABG      Physical Exam:     Vitals:  BP 94/61   Pulse 107   Temp 97.6 °F (36.4 °C) (Oral)   Resp 23   Ht 5' 8\" (1.727 m)   Wt 169 lb 12.1 oz (77 kg)   SpO2 100%   BMI 25.81 kg/m²     General appearance - Intubated and sedated  Mental status - sedated  Head - normocephalic and atraumatic  Eyes -sclera white  Ears - sedated unable to evaluate  Nose - no drainage noted  Mouth - mucous membranes moist  Neck - supple,no JVD  Chest - wound vac intact w/ X2 chest tubes  Heart - s1+s2  Abdomen - soft,

## 2019-04-07 NOTE — CONSULTS
[facility]    Inpatient Consultation Note               Today's Date: 4/7/2019  Patient Name: Casandra Ng  Date of admission: 4/7/2019  4:48 AM  Patient's age: 46 y.o., 1965  Admission Dx: Cardiac arrest [facility]) [I46.9]    Reason for  Consult:  Anteroseptal STEMI    Requesting Physician: Ofelia Crouch DO    CHIEF COMPLAINT:     Chief Complaint   Patient presents with    Cardiac Arrest     diff breather, brought in on cpap with elevations in EKG on EMS arrival.        History Obtained From:  electronic medical record, reason patient could not give history:  on ventilator    HISTORY OF PRESENT ILLNESS:      The patient is a 46 y.o.  male who was brought to the hospital by EMS with worsening shortness of breath for the last 2-3 days per report. A pre-arrival ECG showed possible atrial fibrillation with anteroseptal age recent infarct. As he was being transported into the ED bay, he apparentlyturned gray, vomited, lost pulses and went into PEA arrest. Chest compressions were started. He received epinephrine, intubated and required 2 rounds of CPR. After the arrest, his blood pressure was in the 200s and he was tachycardiac. He was given 325 mg ASA, NTG by EMS. He was also given nebulizer treatment en route along with 125 mg of IV Solumedrol because of wheezing. The Cath Lab was activated from the ED and the patient received IV heparin bolus at that time. In the ED, he was reportedly, moving his limbs. Up til now, the patient's identity is unknown, so is his complete medical history. Efforts underway to identify and contact relatives. THe patient was admitted to Critical Care from the ED. Past Medical History:   has no past medical history on file. Past Surgical History:   has no past surgical history on file. Home Medications:    Prior to Admission medications    Not on File     Allergies:  Patient has no allergy information on record.     Social History: STEMI  2. Acute Respiratory Failure  3. PEA Arrest  4. COPD  5. Tobaco Use    RECOMMENDATIONS:  1. Proceed with emergent coronary angiography  2. Further orders to follow. Labs remain pending  3. Admitted to MICU service from ED      Thank you for allowing us to participate in the care of Pancho Ng. If you have any questions or concerns, please do not hesitate to contact us. Christy Villanueva M.D. Fellow, [address]        Please note that part of this chart were generated using voice recognition  dictation software. Although every effort was made to ensure the accuracy of this automated transcription, some errors in transcription may have occurred. Attestation signed by      Attending Physician Statement:    I have discussed the care of  Pancho Ng , including pertinent history and exam findings, with the Cardiology fellow/resident. I have seen and examined the patient and the key elements of all parts of the encounter have been performed by me. I agree with the assessment, plan and orders as documented by the fellow/resident, after I modified exam findings and plan of treatments, and the final version is my approved version of the assessment. Additional Comments: AL STEMI, PEA arrest. Patient intubated. Emergent cath.     Mendoza Acuña MD

## 2019-04-07 NOTE — ANESTHESIA PRE PROCEDURE
Department of Anesthesiology  Preprocedure Note       Name:  Checo Ng   Age:  46 y.o.  :  1965                                          MRN:  [id number]         Date:  2019      Surgeon: Antoine Larose):  Ahsan Moore MD    Procedure: CABG CORONARY ARTERY BYPASS X3 OR X4, POSSIBLE [address], SWAN ON PUMP, ANGELA (N/A )    Medications prior to admission:   Prior to Admission medications    Not on File       Current medications:    No current facility-administered medications for this encounter. No current outpatient medications on file. Allergies: Allergies not on file    Problem List:    Patient Active Problem List   Diagnosis Code    Cardiac arrest ([facility] [address] I46.9       Past Medical History:  No past medical history on file. Past Surgical History:  No past surgical history on file.     Social History:    Social History     Tobacco Use    Smoking status: Not on file   Substance Use Topics    Alcohol use: Not on file                                Counseling given: Not Answered      Vital Signs (Current):   Vitals:    19 0448   BP: (!) 222/137   Pulse: 144   Resp: 23   Temp: 97.6 °F (36.4 °C)   TempSrc: Oral   SpO2: 100%                                              BP Readings from Last 3 Encounters:   19 (!) 222/137       NPO Status:                                                                                 BMI:   Wt Readings from Last 3 Encounters:   No data found for Wt     There is no height or weight on file to calculate BMI.    CBC:   Lab Results   Component Value Date    WBC 17.5 2019    RBC 5.16 2019    HGB 15.7 2019    HCT 51.9 2019    .6 2019    RDW 12.9 2019     2019       CMP:   Lab Results   Component Value Date     2019    K 3.3 2019    CL 95 2019    CO2 15 2019    BUN 16 2019    CREATININE 0.72 2019    CREATININE 0.90 2019    GFRAA >60 2019 LABGLOM >60 04/07/2019    GLUCOSE 573 04/07/2019    CALCIUM 8.4 04/07/2019       POC Tests:   Recent Labs     04/07/19  0615   POCGLU 506*   POCNA 135*   POCK 4.4   POCCL 105   POCHEMO 18.0*   POCHCT 53       Coags:   Lab Results   Component Value Date    APTT 26.3 04/07/2019       HCG (If Applicable): No results found for: PREGTESTUR, PREGSERUM, HCG, HCGQUANT     ABGs: No results found for: PHART, PO2ART, KWW8BFJ, HIP4YKJ, BEART, S8WQUGXW     Type & Screen (If Applicable):  No results found for: LABABO, [address]    Anesthesia Evaluation  Patient summary reviewed  Airway:        Comment: Oral intubated   Dental:          Pulmonary:   (+) COPD:  decreased breath sounds,                            ROS comment: Acute resp failure   Cardiovascular:  Exercise tolerance: poor (<4 METS),   (+) hypertension:, CAD:, CHF:,         Rhythm: regular  Rate: normal                    Neuro/Psych:                ROS comment: sedated GI/Hepatic/Renal: Neg GI/Hepatic/Renal ROS            Endo/Other:    (+) Diabetes, . Abdominal:           Vascular:   + PVD, aortic or cerebral, . ROS comment: Severe pvd and LLE amp. Findings:     LMCA: has distal 60% stenosis. LAD: has ostial 90% stenosis and long proximal 80% stenosis. D1 has proximal 70% stenosis. LCx: has 20% stenosis. OM1 is very small. OM2 has proximal 90% stenosis. RCA: has proximal 50% stenosis, mid 60% stenosis and distal 80% stenosis. Ramus: has ostial 99% stenosis.     The LV gram was performed in the DUTTON 30 position. LVEF: 15%. LV Wall Motion: severe hypokinesis of the anterior wall and mid inferior wall with apical akinesis.       Conclusions:  1. Multivessel CAD  2. Severe LV systolic dysfunction  3. Mild aortic insufficiency  4. Distal aorta 40% stenosis  5. Occluded left external iliac artery  6. 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 Unable to pass IABP or Impella  7.  Overall preserved LV function         Anesthesia Plan      general     ASA 4 - emergent       Induction: intravenous. arterial line, BIS, central line, CVP, PA catheter and ANGELA    Anesthetic plan and risks discussed with Unable to obtain due to emergent nature. Plan discussed with CRNA.           severe copd, intubated, severe dm with bs>500, pvd, unable to place iabp, chf lvef 15%, pea arrest upon arrival, s/p cprx2        Steffen Spangler MD   4/7/2019

## 2019-04-07 NOTE — PROGRESS NOTES
04/07/19 0650   Vent Information   Vt Ordered 550 mL   Rate Set 28 bmp   FiO2  80 %   PEEP/CPAP 15   I Time/ I Time % 0.7 s     Changes made post ABG in cath lab.

## 2019-04-07 NOTE — FLOWSHEET NOTE
[facility] [address]     Emergency/Trauma Note    PATIENT NAME: Juanito Garcia    Shift date: 4/6/19  Shift day: Saturday   Shift # 3    Room # CVOR POOL RM/NONE   Name: Juanito Garcia            Age: 46 y.o. Gender: male          Christianity: No Gnosticist on file   Place of Alevism:     Trauma/Incident type: Stemi Alert  Admit Date & Time: 4/7/2019  4:48 AM    PATIENT/EVENT DESCRIPTION:  Juanito Garcia is a 46 y.o. male who arrived via (transport) from (scene/accident/event) as a (type/level of trauma). (Description of injuries/condition/event). Pt to be admitted to [address] [place] [address]. SPIRITUAL ASSESSMENT/INTERVENTION:  Griselda Michelle was paged to ED [facility] Alert. Pt was in critical condition and not able to self identify.  was able to use pts phone to make connections for ID.  contacted (Mother) Araceli Cutler from pts cell and described the critical condition of the pt. Pat met  at the Cath lab waiting area and additional family arrived.  and doctor gave medical report, Pt was intubated, sedated and will go to O.R. The doctor called emergency open heart surgery and  remained with family for emotional and spiritual support. This report was passed to first shift Chaplains to follow up up pt and family during emergency surgery. PATIENT BELONGINGS:  With patient    ANY BELONGINGS OF SIGNIFICANT VALUE NOTED:  No    REGISTRATION STAFF NOTIFIED? Yes      WHAT IS YOUR SPIRITUAL CARE PLAN FOR THIS PATIENT?:  Griselda Michelle will follow up as needed during hospital stay.    Electronically signed by Rony Baez on 4/7/2019 at 8:50 AM.  [facility]  [phone]       04/07/19 2659   Encounter Summary   Services provided to: Patient   Referral/Consult From: Multi-disciplinary team   Continue Visiting   (4/6/19)   Complexity of Encounter High   Length of Encounter 1 hour   Spiritual Assessment Completed Yes   Crisis   Type

## 2019-04-07 NOTE — PROGRESS NOTES
PT INTUBATED SEDATED, ARRIVED TO O.R. PER CATH LAB STAFF, PT UNABLE TO DENTIFY SELF, IDENTIFIED AS PATRICIO PORTILLO  VERBALLY ET ID BAND PER CATH LAB STAFF.   STATED FAMILY WAS IN FAMILY WAITING ROOM

## 2019-04-07 NOTE — CONSULTS
Orthopedic Surgery Consult  (Dr. Trace Hastings)                   CC/Reason for consult: R knee visible hardware    HPI:    The patient is a 46 y.o. male who presented to [facility] emergently in PEA arrest. He underwent multiple rounds of CPR and was taken to the OR for 4 vessel bypass. He remains intubated with his chest still open at this time with the plan to return to the OR tomorrow for closure of his chest.    After the OR he was found to have a large prominent screw present on the lateral aspect of his knee with malodorous drainage (see picture below in exam). XR were obtained which demonstrate a history of a tibial plateau fracture. Per the family, the patient sustained the injury from a fall off a roof years ago and it was subsequently fixed at that time. The patient has been non compliant and has not followed up with an orthopedic surgeon since. The family is unsure of the surgeon. It is unknown how long the screw has been visible outside of the skin, or how long the hardware has been infected. At this point the patient is intubated, sedated, and paralyzed and is unable to participate in the exam. History obtained is from the nursing staff and additional notes. Past Medical History:    No past medical history on file. Past Surgical History:    No past surgical history on file. Medications Prior to Admission:   Prior to Admission medications    Not on File       Allergies:    Patient has no allergy information on record.     Social History:   Social History     Socioeconomic History    Marital status: Unknown     Spouse name: Not on file    Number of children: Not on file    Years of education: Not on file    Highest education level: Not on file   Occupational History    Not on file   Social Needs    Financial resource strain: Not on file    Food insecurity:     Worry: Not on file     Inability: Not on file    Transportation needs:     Medical: Not on file     Non-medical: Not on file Tobacco Use    Smoking status: Not on file   Substance and Sexual Activity    Alcohol use: Not on file    Drug use: Not on file    Sexual activity: Not on file   Lifestyle    Physical activity:     Days per week: Not on file     Minutes per session: Not on file    Stress: Not on file   Relationships    Social connections:     Talks on phone: Not on file     Gets together: Not on file     Attends Mormonism service: Not on file     Active member of club or organization: Not on file     Attends meetings of clubs or organizations: Not on file     Relationship status: Not on file    Intimate partner violence:     Fear of current or ex partner: Not on file     Emotionally abused: Not on file     Physically abused: Not on file     Forced sexual activity: Not on file   Other Topics Concern    Not on file   Social History Narrative    Not on file       Family History:  No family history on file. REVIEW OF SYSTEMS:    Unable to obtain due to clinical status of patient    PHYSICAL EXAM:  BP 94/61   Pulse 107   Temp 97.6 °F (36.4 °C) (Oral)   Resp 23   Ht 5' 8\" (1.727 m)   Wt 169 lb 12.1 oz (77 kg)   SpO2 100%   BMI 25.81 kg/m²     Gen: Intubated, sedated    RLE: No ecchymosis or deformities. Orthopedic screw backing out and through the skin on lateral aspect of tibial plateau. Malodorous discharge present. Foot and toes warm and well-perfused; No palpable pulses though DP doppler signal present. Media Information                  Document Information     Wound   R knee tibial plateau hardware   22/60/5622 15:51   Attached To: Hospital Encounter on 4/7/19     Source Information     [employer]                 LABS:  Recent Labs     04/07/19  1205   WBC 26.4*   HGB 12.8*   HCT 39.4*      INR 1.3   *   K 3.7   BUN 16   CREATININE 0.65*   GLUCOSE 155*        Radiology:   R knee XR : demonstrate history of tibial plateau fracture s/p ORIF.  Signficiant lucency noted around hardware with significant osteolysis present around proximal aspect of plate. Proximal screw has backed out and is outside of the soft tissues. A/P: 46 y.o. male s/p cardiac arrest and 4 vessel CABG being seen for R knee infected hardware and visible screw prominence    -Discussion had with patient's son Jus Martinez with RN present regarding the prominent hardware and he agreed to allow us to remove the screw which was successfully performed at bedside.   -Cultures of screw threads sent for analysis  -WTD dressings to RLE wound. Dressing change as needed.  -Patient will need additional hardware as the entire plate is likely infected. Please provide medical clearance when safe for hardware removal  -Recommend ID consult for infected hardware  -Repeat XR R knee. -Weight bearing: Activity as tolerated  -Medical management and pain control per primary team  -Vascular is on for decreased pulses of bilateral lower extremities  -Ice and elevation for pain/swelling  -Please page Ortho with any questions or concerns    Twan Isaac DO,   PGY-2 Orthopedic Surgery  4:20 PM 4/7/2019      Procedure: Screw removal at bedside, 4/7/19  After discussion had with the son, he agreed to allow us to move forward with bedside screw removal. The RLE area around the screw was prepped with betadine solution and the screw was then gently removed with an [address]. The screw was then sent for cultures of the threads, which had remained intraosseous prior to screw removal. WTD dressings were then applied to the RLE.

## 2019-04-07 NOTE — CONSULTS
PULMONARY CONSULT NOTE       REASON FOR THE CONSULTATION:  Cardiac arrest    HISTORY OF PRESENT ILLNESS:    Radha Manjarrez is a 46 y.o. yo male who is admitted for the manegement of cardiac arrest and STEMI. Patient presented as Tawny Payor Ng with unknown medical surgical history. Brought in by EMS   Who were initially called in by patient for worsening shortness of breath for last 2-3 days. .  I will EKG showed possible atrial fibrillation with anterior septal infarct. As as he was being transported to Hospital he had PEA arrest. . He required 2 rounds of CPR before return of spontaneous circulation was obtained. He received aspirin and nitroglycerin by EMS. Also received an 125 mg of Solu-Medrol because of wheezing. Cath lab was activated for STEMI and patient is received IV heparin bolus. Per ED he was moving his limbs. Left heart cath showed multivessel coronary artery disease with severe LV systolic dysfunction with EF of 15% and severe hypokinesis of anterior and mid inferior wall with apical akinesis. Also noted was severe diffuse disease of the right iliac artery occluded left external iliac artery inability to pass IABP or impella. Urgent surgical Consult was placed to CT surgery and patient was taken up for CABG. Patient underwent coronary artery bypass graft x 4. Patient seen in cardiac ICU post surgery - patient is intubated, sedated on propofol and paralyzed with Nimbex. On 2 vasopressors. Chest wall could not be closed because of swelling/edema. On insulin drip. On FiO2 of 50, respiratory rate 26, tidal volume 660, PEEP of 10. Afebrile. Also noted to have a fixator in his right knee from previous fall and fracture. Orthopedic surgery consulted for same imaging studies ordered. PAST MEDICAL HISTORY:   No past medical history on file. FAMILY HISTORY:  No family history on file. SURGICAL HISTORY:   No past surgical history on file.     SOCIAL AND OCCUPATIONAL HEALTH: Alcohol-   has no alcohol history on file. ALLERGIES:    Allergies not on file    Home Meds:   No medications prior to admission. REVIEW OF SYSTEMS:  Not obtained    PHYSICAL EXAMINATION:  Constitutional: Patient is intubated, sedated, paralyzed  HEENT: PERRLA, EOMI, sclera clear, anicteric, oropharynx clear, no lesions, neck supple with midline trachea   Neck: Supple, symmetrical, trachea midline, no adenopathy, not enlarged and no tenderness, skin normal  Respiratory: clear to auscultation, no wheezes or rales and unlabored breathing  Cardiovascular: regular rate and rhythm, normal S1, S2, no murmur noted  Abdomen: soft, nontender, nondistended, no masses or organomegaly  Extremities:  no pedal edema, no clubbing or cyanosis    LABS:  CBC:   Recent Labs     04/07/19  0503 04/07/19  1205   WBC 17.5* 26.4*   HGB 15.7 12.8*    156     BMP:    Recent Labs     04/07/19  0503  04/07/19  0737 04/07/19  1038 04/07/19  1205   *  --   --   --  149*   K 3.3*  --   --   --  3.7   CL 95*  --   --   --  111*   CO2 15*  --   --   --  26   BUN 16  --   --   --  16   CREATININE 0.90   < > 0.83 0.90 0.65*   GLUCOSE 573*  --   --   --  155*    < > = values in this interval not displayed. Hepatic: No results for input(s): AST, ALT, ALB, BILITOT, ALKPHOS in the last 72 hours.   ABGs: No results found for: PHART, PO2ART, JUL4RCO      ASSESSMENT :      STEMI  PEA cardiac arrest  Acute respiratory failure  Acute cardiogenic pulmonary edema  Cardiogenic shock  Multivessel coronary artery disease  Status post left ventricular catheterization  Status post CABG  Diabetes mellitus  Acute blood loss anemia      PLAN:  Continue mechanical ventilation  Propofol for sedation  Aspiration precautions  Continue Nimbex for paralysis  Continue pressure support- currently on norepinephrine and phenylephrine  Insulin drip  Management of STEMI by cardiology and CTS  Monitor intake and output      Electronically signed by Tiffany Tavarez Rebecca Stein MD on 4/7/2019 at 4:05 PM

## 2019-04-07 NOTE — BRIEF OP NOTE
Brief Postoperative Note  ______________________________________________________________    Patient: Radha Manjarrez  YOB: 1965  MRN: [id number]  Date of Procedure: 4/7/2019    Pre-Op Diagnosis: SEVERE C. A.D.  EMERGENT CPR preop and in OR ([facility])  Cath lab unable to place supportive device IABP/Impella on account of severe PVD    Post-Op Diagnosis: Same       Procedure(s):  EMERGENT (CPR into OR) Immediate placement on bypass  A. CORONARY ARTERY BYPASS X4  1.lima-lad (harvested on bypass while waiting for vein)  2. svg-pda  3,4 svg-om1-(sequential)-distal circx   ON PUMP   CHEST LEFT OPEN-unable to close cardiac swelling/edema  ; SWAN, ANGELA PER ANESTHESIA  #####Left cordis in Lt IJ-for backup Kylah ecmo if necessary/no IABP on account severe PVD-currently not absolutely necessary otherwise will place on ecmo or consider axillary impella prior to ecmo###     Anesthesia: General    Surgeon(s):  MD Trevor Velasco MD    Assistant: Vinicius travis    Estimated Blood Loss (mL): 088     Complications: preop arrest in OR    Specimens:   ID Type Source Tests Collected by Time Destination   1 : TYPE AND SCREEN Blood CSF/Blood TYPE AND SCREEN Trevor Faria MD 4/7/2019 0730              Drains:   Chest Tube 1 Anterior 28 Kyr[place]z (Active)       Chest Tube 2 Anterior 28 [place]z (Active)       NG/OG/NJ/NE Tube Orogastric 12 fr Center mouth (Active)   Surrounding Skin Intact 4/7/2019  5:08 AM   Placement Verified by X-Ray (Initial) 4/7/2019  5:08 AM       Urethral Catheter Straight-tip (Active)   Catheter Indications Need for fluid management in critically ill patients in a critical care setting not able to be managed by other means such as BSC with hat, bedpan, urinal, condom catheter, or short term intermittent urethral catherization 4/7/2019  5:39 AM   Urine Color Yellow 4/7/2019  5:39 AM   Urine Appearance Clear 4/7/2019  5:39 AM       Findings: preop prebypass arrest CPR. EF%10 from cath lab report EF%45 postop.  Sats on vent post Pao2 >200 will closely follow  Chest left open -wound vac- plan for elective closure in AM  Cristiane James MD  Date: 4/7/2019  Time: 11:18 AM

## 2019-04-08 ENCOUNTER — APPOINTMENT (OUTPATIENT)
Dept: GENERAL RADIOLOGY | Age: 54
DRG: 165 | End: 2019-04-08
Payer: MEDICAID

## 2019-04-08 ENCOUNTER — ANESTHESIA (OUTPATIENT)
Dept: OPERATING ROOM | Age: 54
DRG: 165 | End: 2019-04-08
Payer: MEDICAID

## 2019-04-08 VITALS — OXYGEN SATURATION: 98 % | TEMPERATURE: 99.3 F | RESPIRATION RATE: 12 BRPM

## 2019-04-08 LAB
ABO/RH: NORMAL
ALLEN TEST: ABNORMAL
ANION GAP SERPL CALCULATED.3IONS-SCNC: 9 MMOL/L (ref 9–17)
ANION GAP: 13 MMOL/L (ref 7–16)
ANTIBODY SCREEN: NEGATIVE
ARM BAND NUMBER: NORMAL
BLD PROD TYP BPU: NORMAL
BLD PROD TYP BPU: NORMAL
BUN BLDV-MCNC: 14 MG/DL (ref 6–20)
BUN/CREAT BLD: ABNORMAL (ref 9–20)
CALCIUM SERPL-MCNC: 8.1 MG/DL (ref 8.6–10.4)
CHLORIDE BLD-SCNC: 117 MMOL/L (ref 98–107)
CO2: 21 MMOL/L (ref 20–31)
CREAT SERPL-MCNC: 0.63 MG/DL (ref 0.7–1.2)
CROSSMATCH RESULT: NORMAL
CROSSMATCH RESULT: NORMAL
DISPENSE STATUS BLOOD BANK: NORMAL
DISPENSE STATUS BLOOD BANK: NORMAL
EKG ATRIAL RATE: 147 BPM
EKG P AXIS: 34 DEGREES
EKG P-R INTERVAL: 146 MS
EKG Q-T INTERVAL: 286 MS
EKG QRS DURATION: 100 MS
EKG QTC CALCULATION (BAZETT): 447 MS
EKG R AXIS: 62 DEGREES
EKG T AXIS: 139 DEGREES
EKG VENTRICULAR RATE: 147 BPM
EXPIRATION DATE: NORMAL
FIO2: 40
FIO2: 50
FIO2: ABNORMAL
FIO2: ABNORMAL
GFR AFRICAN AMERICAN: >60 ML/MIN
GFR NON-AFRICAN AMERICAN: >60 ML/MIN
GFR SERPL CREATININE-BSD FRML MDRD: ABNORMAL ML/MIN/{1.73_M2}
GFR SERPL CREATININE-BSD FRML MDRD: ABNORMAL ML/MIN/{1.73_M2}
GLUCOSE BLD-MCNC: 100 MG/DL (ref 75–110)
GLUCOSE BLD-MCNC: 111 MG/DL (ref 75–110)
GLUCOSE BLD-MCNC: 113 MG/DL (ref 75–110)
GLUCOSE BLD-MCNC: 113 MG/DL (ref 75–110)
GLUCOSE BLD-MCNC: 114 MG/DL (ref 74–100)
GLUCOSE BLD-MCNC: 122 MG/DL (ref 75–110)
GLUCOSE BLD-MCNC: 124 MG/DL (ref 74–100)
GLUCOSE BLD-MCNC: 124 MG/DL (ref 75–110)
GLUCOSE BLD-MCNC: 126 MG/DL (ref 75–110)
GLUCOSE BLD-MCNC: 131 MG/DL (ref 75–110)
GLUCOSE BLD-MCNC: 76 MG/DL (ref 75–110)
GLUCOSE BLD-MCNC: 78 MG/DL (ref 75–110)
GLUCOSE BLD-MCNC: 79 MG/DL (ref 75–110)
GLUCOSE BLD-MCNC: 79 MG/DL (ref 75–110)
GLUCOSE BLD-MCNC: 81 MG/DL (ref 75–110)
GLUCOSE BLD-MCNC: 82 MG/DL (ref 75–110)
GLUCOSE BLD-MCNC: 88 MG/DL (ref 74–100)
GLUCOSE BLD-MCNC: 91 MG/DL (ref 75–110)
GLUCOSE BLD-MCNC: 93 MG/DL (ref 75–110)
GLUCOSE BLD-MCNC: 97 MG/DL (ref 70–99)
GLUCOSE BLD-MCNC: 97 MG/DL (ref 75–110)
GLUCOSE BLD-MCNC: 98 MG/DL (ref 75–110)
HBCO, MIXED, EXTENDED: 0.9 % (ref 0–5)
HCT VFR BLD CALC: 30.5 % (ref 40.7–50.3)
HCT VFR BLD CALC: 31.4 % (ref 40.7–50.3)
HEMOGLOBIN, MIXED, EXTENDED: 9 G/DL (ref 12–18)
HEMOGLOBIN: 10.1 G/DL (ref 13–17)
HEMOGLOBIN: 9.8 G/DL (ref 13–17)
INR BLD: 1.3
MAGNESIUM: 2.1 MG/DL (ref 1.6–2.6)
MCH RBC QN AUTO: 30.4 PG (ref 25.2–33.5)
MCHC RBC AUTO-ENTMCNC: 32.2 G/DL (ref 28.4–34.8)
MCV RBC AUTO: 94.6 FL (ref 82.6–102.9)
METHB, MIXED, EXTENDED: 0.6 % (ref 0–1.5)
MODE: ABNORMAL
NEGATIVE BASE EXCESS, ART: 2 (ref 0–2)
NEGATIVE BASE EXCESS, ART: 7 (ref 0–2)
NEGATIVE BASE EXCESS, ART: ABNORMAL (ref 0–2)
NEGATIVE BASE EXCESS, ART: ABNORMAL (ref 0–2)
NRBC AUTOMATED: 0 PER 100 WBC
O2 CONTENT, MIXED, EXTENDED: 5 VOL % (ref 12–20)
O2 DEVICE/FLOW/%: ABNORMAL
O2 SAT, MIXED, EXTENDED: 43.1 % (ref 60–80)
OXYGEN STATUS: ABNORMAL
PATIENT TEMP: 38.2
PATIENT TEMP: 38.7
PATIENT TEMP: 38.7
PATIENT TEMP: ABNORMAL
PDW BLD-RTO: 13.6 % (ref 11.8–14.4)
PLATELET # BLD: ABNORMAL K/UL (ref 138–453)
PLATELET, FLUORESCENCE: 130 K/UL (ref 138–453)
PLATELET, IMMATURE FRACTION: 4.3 % (ref 1.1–10.3)
PMV BLD AUTO: ABNORMAL FL (ref 8.1–13.5)
POC CHLORIDE: 115 MMOL/L (ref 98–107)
POC HCO3: 16.2 MMOL/L (ref 21–28)
POC HCO3: 22.2 MMOL/L (ref 21–28)
POC HCO3: 25 MMOL/L (ref 21–28)
POC HCO3: 25 MMOL/L (ref 21–28)
POC HEMATOCRIT: 29 % (ref 41–53)
POC HEMOGLOBIN: 9.8 G/DL (ref 13.5–17.5)
POC IONIZED CALCIUM: 1.16 MMOL/L (ref 1.15–1.33)
POC O2 SATURATION: 91 % (ref 94–98)
POC O2 SATURATION: 95 % (ref 94–98)
POC O2 SATURATION: 96 % (ref 94–98)
POC O2 SATURATION: 98 % (ref 94–98)
POC PCO2 TEMP: 28 MM HG
POC PCO2 TEMP: 37 MM HG
POC PCO2 TEMP: 38 MM HG
POC PCO2 TEMP: ABNORMAL MM HG
POC PCO2: 26.1 MM HG (ref 35–48)
POC PCO2: 34.9 MM HG (ref 35–48)
POC PCO2: 35.5 MM HG (ref 35–48)
POC PCO2: 36.3 MM HG (ref 35–48)
POC PH TEMP: 7.38
POC PH TEMP: 7.44
POC PH TEMP: 7.44
POC PH TEMP: ABNORMAL
POC PH: 7.39 (ref 7.35–7.45)
POC PH: 7.4 (ref 7.35–7.45)
POC PH: 7.46 (ref 7.35–7.45)
POC PH: 7.46 (ref 7.35–7.45)
POC PO2 TEMP: 100 MM HG
POC PO2 TEMP: 68 MM HG
POC PO2 TEMP: 86 MM HG
POC PO2 TEMP: ABNORMAL MM HG
POC PO2: 60.3 MM HG (ref 83–108)
POC PO2: 75.4 MM HG (ref 83–108)
POC PO2: 79.5 MM HG (ref 83–108)
POC PO2: 89.7 MM HG (ref 83–108)
POC POTASSIUM: 3.9 MMOL/L (ref 3.5–4.5)
POC SODIUM: 150 MMOL/L (ref 138–146)
POSITIVE BASE EXCESS, ART: 1 (ref 0–3)
POSITIVE BASE EXCESS, ART: 1 (ref 0–3)
POSITIVE BASE EXCESS, ART: ABNORMAL (ref 0–3)
POSITIVE BASE EXCESS, ART: ABNORMAL (ref 0–3)
POTASSIUM SERPL-SCNC: 4 MMOL/L (ref 3.7–5.3)
PROTHROMBIN TIME: 13.3 SEC (ref 9–12)
RBC # BLD: 3.32 M/UL (ref 4.21–5.77)
SAMPLE SITE: ABNORMAL
SODIUM BLD-SCNC: 147 MMOL/L (ref 135–144)
TCO2 (CALC), ART: 17 MMOL/L (ref 22–29)
TCO2 (CALC), ART: 23 MMOL/L (ref 22–29)
TCO2 (CALC), ART: 26 MMOL/L (ref 22–29)
TCO2 (CALC), ART: 26 MMOL/L (ref 22–29)
TRANSFUSION STATUS: NORMAL
TRANSFUSION STATUS: NORMAL
UNIT DIVISION: 0
UNIT DIVISION: 0
UNIT NUMBER: NORMAL
UNIT NUMBER: NORMAL
WBC # BLD: 14.2 K/UL (ref 3.5–11.3)

## 2019-04-08 PROCEDURE — 2580000003 HC RX 258: Performed by: NURSE PRACTITIONER

## 2019-04-08 PROCEDURE — 83735 ASSAY OF MAGNESIUM: CPT

## 2019-04-08 PROCEDURE — 2580000003 HC RX 258: Performed by: INTERNAL MEDICINE

## 2019-04-08 PROCEDURE — 6360000002 HC RX W HCPCS: Performed by: THORACIC SURGERY (CARDIOTHORACIC VASCULAR SURGERY)

## 2019-04-08 PROCEDURE — 2500000003 HC RX 250 WO HCPCS: Performed by: THORACIC SURGERY (CARDIOTHORACIC VASCULAR SURGERY)

## 2019-04-08 PROCEDURE — 82330 ASSAY OF CALCIUM: CPT

## 2019-04-08 PROCEDURE — 2580000003 HC RX 258: Performed by: THORACIC SURGERY (CARDIOTHORACIC VASCULAR SURGERY)

## 2019-04-08 PROCEDURE — 6360000002 HC RX W HCPCS: Performed by: NURSE PRACTITIONER

## 2019-04-08 PROCEDURE — 82375 ASSAY CARBOXYHB QUANT: CPT

## 2019-04-08 PROCEDURE — 3600000015 HC SURGERY LEVEL 5 ADDTL 15MIN: Performed by: THORACIC SURGERY (CARDIOTHORACIC VASCULAR SURGERY)

## 2019-04-08 PROCEDURE — 99233 SBSQ HOSP IP/OBS HIGH 50: CPT | Performed by: INTERNAL MEDICINE

## 2019-04-08 PROCEDURE — 82803 BLOOD GASES ANY COMBINATION: CPT

## 2019-04-08 PROCEDURE — 5A1955Z RESPIRATORY VENTILATION, GREATER THAN 96 CONSECUTIVE HOURS: ICD-10-PCS | Performed by: INTERNAL MEDICINE

## 2019-04-08 PROCEDURE — 87205 SMEAR GRAM STAIN: CPT

## 2019-04-08 PROCEDURE — 0PQ00ZZ REPAIR STERNUM, OPEN APPROACH: ICD-10-PCS | Performed by: THORACIC SURGERY (CARDIOTHORACIC VASCULAR SURGERY)

## 2019-04-08 PROCEDURE — 6370000000 HC RX 637 (ALT 250 FOR IP): Performed by: NURSE PRACTITIONER

## 2019-04-08 PROCEDURE — 2700000000 HC OXYGEN THERAPY PER DAY

## 2019-04-08 PROCEDURE — 84132 ASSAY OF SERUM POTASSIUM: CPT

## 2019-04-08 PROCEDURE — 99024 POSTOP FOLLOW-UP VISIT: CPT | Performed by: PHYSICIAN ASSISTANT

## 2019-04-08 PROCEDURE — 21750 REPAIR OF STERNUM SEPARATION: CPT | Performed by: THORACIC SURGERY (CARDIOTHORACIC VASCULAR SURGERY)

## 2019-04-08 PROCEDURE — 84295 ASSAY OF SERUM SODIUM: CPT

## 2019-04-08 PROCEDURE — P9041 ALBUMIN (HUMAN),5%, 50ML: HCPCS | Performed by: NURSE PRACTITIONER

## 2019-04-08 PROCEDURE — 85018 HEMOGLOBIN: CPT

## 2019-04-08 PROCEDURE — 7100000001 HC PACU RECOVERY - ADDTL 15 MIN

## 2019-04-08 PROCEDURE — 2500000003 HC RX 250 WO HCPCS: Performed by: NURSE PRACTITIONER

## 2019-04-08 PROCEDURE — 85014 HEMATOCRIT: CPT

## 2019-04-08 PROCEDURE — 3700000000 HC ANESTHESIA ATTENDED CARE: Performed by: THORACIC SURGERY (CARDIOTHORACIC VASCULAR SURGERY)

## 2019-04-08 PROCEDURE — 82435 ASSAY OF BLOOD CHLORIDE: CPT

## 2019-04-08 PROCEDURE — 3600000005 HC SURGERY LEVEL 5 BASE: Performed by: THORACIC SURGERY (CARDIOTHORACIC VASCULAR SURGERY)

## 2019-04-08 PROCEDURE — 2100000001 HC CVICU R&B

## 2019-04-08 PROCEDURE — 93005 ELECTROCARDIOGRAM TRACING: CPT

## 2019-04-08 PROCEDURE — 99254 IP/OBS CNSLTJ NEW/EST MOD 60: CPT | Performed by: SURGERY

## 2019-04-08 PROCEDURE — 3700000001 HC ADD 15 MINUTES (ANESTHESIA): Performed by: THORACIC SURGERY (CARDIOTHORACIC VASCULAR SURGERY)

## 2019-04-08 PROCEDURE — P9045 ALBUMIN (HUMAN), 5%, 250 ML: HCPCS | Performed by: NURSE ANESTHETIST, CERTIFIED REGISTERED

## 2019-04-08 PROCEDURE — 2580000003 HC RX 258: Performed by: NURSE ANESTHETIST, CERTIFIED REGISTERED

## 2019-04-08 PROCEDURE — 85610 PROTHROMBIN TIME: CPT

## 2019-04-08 PROCEDURE — 2500000003 HC RX 250 WO HCPCS: Performed by: NURSE ANESTHETIST, CERTIFIED REGISTERED

## 2019-04-08 PROCEDURE — 94762 N-INVAS EAR/PLS OXIMTRY CONT: CPT

## 2019-04-08 PROCEDURE — 80048 BASIC METABOLIC PNL TOTAL CA: CPT

## 2019-04-08 PROCEDURE — 71045 X-RAY EXAM CHEST 1 VIEW: CPT

## 2019-04-08 PROCEDURE — 87070 CULTURE OTHR SPECIMN AEROBIC: CPT

## 2019-04-08 PROCEDURE — 99291 CRITICAL CARE FIRST HOUR: CPT | Performed by: INTERNAL MEDICINE

## 2019-04-08 PROCEDURE — 94003 VENT MGMT INPAT SUBQ DAY: CPT

## 2019-04-08 PROCEDURE — 2709999900 HC NON-CHARGEABLE SUPPLY: Performed by: THORACIC SURGERY (CARDIOTHORACIC VASCULAR SURGERY)

## 2019-04-08 PROCEDURE — 85027 COMPLETE CBC AUTOMATED: CPT

## 2019-04-08 PROCEDURE — 83050 HGB METHEMOGLOBIN QUAN: CPT

## 2019-04-08 PROCEDURE — 94770 HC ETCO2 MONITOR DAILY: CPT

## 2019-04-08 PROCEDURE — 87075 CULTR BACTERIA EXCEPT BLOOD: CPT

## 2019-04-08 PROCEDURE — 82805 BLOOD GASES W/O2 SATURATION: CPT

## 2019-04-08 PROCEDURE — 6360000002 HC RX W HCPCS: Performed by: INTERNAL MEDICINE

## 2019-04-08 PROCEDURE — 7100000000 HC PACU RECOVERY - FIRST 15 MIN

## 2019-04-08 PROCEDURE — 82947 ASSAY GLUCOSE BLOOD QUANT: CPT

## 2019-04-08 PROCEDURE — 85055 RETICULATED PLATELET ASSAY: CPT

## 2019-04-08 PROCEDURE — 6360000002 HC RX W HCPCS: Performed by: NURSE ANESTHETIST, CERTIFIED REGISTERED

## 2019-04-08 RX ORDER — ROCURONIUM BROMIDE 10 MG/ML
INJECTION, SOLUTION INTRAVENOUS PRN
Status: DISCONTINUED | OUTPATIENT
Start: 2019-04-08 | End: 2019-04-08 | Stop reason: SDUPTHER

## 2019-04-08 RX ORDER — DOBUTAMINE HYDROCHLORIDE 400 MG/100ML
2.5 INJECTION INTRAVENOUS CONTINUOUS
Status: DISCONTINUED | OUTPATIENT
Start: 2019-04-08 | End: 2019-04-10

## 2019-04-08 RX ORDER — PROPOFOL 10 MG/ML
INJECTION, EMULSION INTRAVENOUS CONTINUOUS PRN
Status: DISCONTINUED | OUTPATIENT
Start: 2019-04-08 | End: 2019-04-08 | Stop reason: SDUPTHER

## 2019-04-08 RX ORDER — ALBUMIN, HUMAN INJ 5% 5 %
SOLUTION INTRAVENOUS PRN
Status: DISCONTINUED | OUTPATIENT
Start: 2019-04-08 | End: 2019-04-08 | Stop reason: SDUPTHER

## 2019-04-08 RX ORDER — FENTANYL CITRATE 50 UG/ML
INJECTION, SOLUTION INTRAMUSCULAR; INTRAVENOUS PRN
Status: DISCONTINUED | OUTPATIENT
Start: 2019-04-08 | End: 2019-04-08 | Stop reason: SDUPTHER

## 2019-04-08 RX ORDER — MAGNESIUM HYDROXIDE 1200 MG/15ML
LIQUID ORAL CONTINUOUS PRN
Status: COMPLETED | OUTPATIENT
Start: 2019-04-08 | End: 2019-04-08

## 2019-04-08 RX ADMIN — DOBUTAMINE HYDROCHLORIDE 2.5 MCG/KG/MIN: 400 INJECTION INTRAVENOUS at 23:21

## 2019-04-08 RX ADMIN — Medication 1.9 UNITS/HR: at 12:52

## 2019-04-08 RX ADMIN — PROPOFOL 35 MCG/KG/MIN: 10 INJECTION, EMULSION INTRAVENOUS at 02:31

## 2019-04-08 RX ADMIN — PROPOFOL 35 MCG/KG/MIN: 10 INJECTION, EMULSION INTRAVENOUS at 21:18

## 2019-04-08 RX ADMIN — AMIODARONE HYDROCHLORIDE 0.5 MG/MIN: 50 INJECTION, SOLUTION INTRAVENOUS at 03:07

## 2019-04-08 RX ADMIN — PHENYLEPHRINE HYDROCHLORIDE 100 MCG: 10 INJECTION INTRAVENOUS at 13:14

## 2019-04-08 RX ADMIN — FENTANYL CITRATE 100 MCG: 50 INJECTION, SOLUTION INTRAMUSCULAR; INTRAVENOUS at 13:39

## 2019-04-08 RX ADMIN — PHENYLEPHRINE HYDROCHLORIDE 100 MCG: 10 INJECTION INTRAVENOUS at 13:35

## 2019-04-08 RX ADMIN — PROPOFOL 35 MCG/KG/MIN: 10 INJECTION, EMULSION INTRAVENOUS at 15:59

## 2019-04-08 RX ADMIN — EPINEPHRINE 0.02 MCG: 1 INJECTION PARENTERAL at 12:52

## 2019-04-08 RX ADMIN — Medication 15 ML: at 22:07

## 2019-04-08 RX ADMIN — Medication 10 ML: at 09:55

## 2019-04-08 RX ADMIN — FENTANYL CITRATE 50 MCG: 50 INJECTION, SOLUTION INTRAMUSCULAR; INTRAVENOUS at 13:45

## 2019-04-08 RX ADMIN — ROCURONIUM BROMIDE 50 MG: 10 INJECTION INTRAVENOUS at 13:01

## 2019-04-08 RX ADMIN — FENTANYL CITRATE 100 MCG: 50 INJECTION, SOLUTION INTRAMUSCULAR; INTRAVENOUS at 13:27

## 2019-04-08 RX ADMIN — MUPIROCIN: 20 OINTMENT TOPICAL at 11:25

## 2019-04-08 RX ADMIN — OXYCODONE HYDROCHLORIDE AND ACETAMINOPHEN 2 TABLET: 5; 325 TABLET ORAL at 02:32

## 2019-04-08 RX ADMIN — AMIODARONE HYDROCHLORIDE 0.5 MG/MIN: 50 INJECTION, SOLUTION INTRAVENOUS at 21:08

## 2019-04-08 RX ADMIN — ALBUMIN (HUMAN) 250 ML: 12.5 INJECTION, SOLUTION INTRAVENOUS at 13:37

## 2019-04-08 RX ADMIN — VANCOMYCIN HYDROCHLORIDE 1000 MG: 1 INJECTION, SOLUTION INTRAVENOUS at 08:53

## 2019-04-08 RX ADMIN — MUPIROCIN: 20 OINTMENT TOPICAL at 22:07

## 2019-04-08 RX ADMIN — ALBUMIN (HUMAN) 25 G: 12.5 INJECTION, SOLUTION INTRAVENOUS at 01:17

## 2019-04-08 RX ADMIN — Medication 15 ML: at 11:24

## 2019-04-08 RX ADMIN — DESMOPRESSIN ACETATE 40 MG: 0.2 TABLET ORAL at 20:11

## 2019-04-08 RX ADMIN — FAMOTIDINE 20 MG: 10 INJECTION, SOLUTION INTRAVENOUS at 09:51

## 2019-04-08 RX ADMIN — METOPROLOL TARTRATE 25 MG: 25 TABLET ORAL at 20:11

## 2019-04-08 RX ADMIN — PROPOFOL 50 MCG/KG/MIN: 10 INJECTION, EMULSION INTRAVENOUS at 13:46

## 2019-04-08 RX ADMIN — OXYCODONE HYDROCHLORIDE AND ACETAMINOPHEN 2 TABLET: 5; 325 TABLET ORAL at 11:25

## 2019-04-08 RX ADMIN — PROPOFOL 35 MCG/KG/MIN: 10 INJECTION, EMULSION INTRAVENOUS at 08:50

## 2019-04-08 RX ADMIN — SODIUM CHLORIDE 2.5 MCG/KG/MIN: 9 INJECTION, SOLUTION INTRAVENOUS at 02:08

## 2019-04-08 RX ADMIN — VANCOMYCIN HYDROCHLORIDE 1000 MG: 1 INJECTION, SOLUTION INTRAVENOUS at 20:11

## 2019-04-08 RX ADMIN — OXYCODONE HYDROCHLORIDE AND ACETAMINOPHEN 2 TABLET: 5; 325 TABLET ORAL at 20:11

## 2019-04-08 RX ADMIN — POTASSIUM CHLORIDE 20 MEQ: 29.8 INJECTION, SOLUTION INTRAVENOUS at 06:31

## 2019-04-08 RX ADMIN — PHENYLEPHRINE HYDROCHLORIDE 50 MCG/MIN: 10 INJECTION INTRAVENOUS at 13:14

## 2019-04-08 RX ADMIN — OXYCODONE HYDROCHLORIDE AND ACETAMINOPHEN 2 TABLET: 5; 325 TABLET ORAL at 15:59

## 2019-04-08 ASSESSMENT — PULMONARY FUNCTION TESTS
PIF_VALUE: 10
PIF_VALUE: 27
PIF_VALUE: 26
PIF_VALUE: 26
PIF_VALUE: 27
PIF_VALUE: 24
PIF_VALUE: 31
PIF_VALUE: 30
PIF_VALUE: 25
PIF_VALUE: 29
PIF_VALUE: 26
PIF_VALUE: 30
PIF_VALUE: 24
PIF_VALUE: 0
PIF_VALUE: 24
PIF_VALUE: 24
PIF_VALUE: 26
PIF_VALUE: 28
PIF_VALUE: 24
PIF_VALUE: 24
PIF_VALUE: 27
PIF_VALUE: 29
PIF_VALUE: 27
PIF_VALUE: 24
PIF_VALUE: 27
PIF_VALUE: 29
PIF_VALUE: 28
PIF_VALUE: 24
PIF_VALUE: 30
PIF_VALUE: 33
PIF_VALUE: 29
PIF_VALUE: 27
PIF_VALUE: 24
PIF_VALUE: 29
PIF_VALUE: 28
PIF_VALUE: 26
PIF_VALUE: 29
PIF_VALUE: 26
PIF_VALUE: 24
PIF_VALUE: 27
PIF_VALUE: 25
PIF_VALUE: 29
PIF_VALUE: 24
PIF_VALUE: 26
PIF_VALUE: 31
PIF_VALUE: 25
PIF_VALUE: 27
PIF_VALUE: 0
PIF_VALUE: 24
PIF_VALUE: 35
PIF_VALUE: 32
PIF_VALUE: 28
PIF_VALUE: 30
PIF_VALUE: 28
PIF_VALUE: 29
PIF_VALUE: 30
PIF_VALUE: 24
PIF_VALUE: 31
PIF_VALUE: 28
PIF_VALUE: 31
PIF_VALUE: 28
PIF_VALUE: 32
PIF_VALUE: 28
PIF_VALUE: 1
PIF_VALUE: 29
PIF_VALUE: 28
PIF_VALUE: 27
PIF_VALUE: 25
PIF_VALUE: 28

## 2019-04-08 NOTE — ANESTHESIA PRE PROCEDURE
mg Intravenous Q5 Min PRN  ISAAC Moon - CNP        [MAR Hold] mupirocin (BACTROBAN) 2 % ointment   Nasal BID [facility] Hold] therapeutic multivitamin-minerals 1 tablet  1 tablet Oral Daily with breakfast ISAAC Wyatt - CNP        [MAR Hold] atorvastatin (LIPITOR) tablet 40 mg  40 mg Oral Nightly [facility] - CNP        [MAR Hold] enoxaparin (LOVENOX) injection 40 mg  40 mg Subcutaneous Daily [facility] APRN - CNP        [MAR Hold] aspirin EC tablet 81 mg  81 mg Oral Daily [facility] APRN - CNP   81 mg at 04/07/19 1505    [MAR Hold] clopidogrel (PLAVIX) tablet 75 mg  75 mg Oral Daily [facility], [facility] - CNP        [MAR Hold] albumin human 5 % bottle 25 g  25 g Intravenous PRN [facility], APRN - CNP   25 g at 04/08/19 0117    [MAR Hold] insulin lispro (HUMALOG) injection vial 0-12 Units  0-12 Units Subcutaneous TID WC Ivan Moon [facility] - CNP        [MAR Hold] insulin lispro (HUMALOG) injection vial 0-6 Units  0-6 Units Subcutaneous Nightly [facility] - CNP        [MAR Hold] glucose (GLUTOSE) 40 % oral gel 15 g  15 g Oral PRN [facility] Hold] dextrose 50 % solution 12.5 g  12.5 g Intravenous PRN [facility] - CNP        [MAR Hold] glucagon (rDNA) injection 1 mg  1 mg Intramuscular PRN [facility], [facility] - CNP        [MAR Hold] dextrose 5 % solution  100 mL/hr Intravenous PRN [facility] APRN - CNP        [MAR Hold] vancomycin (VANCOCIN) 1000 mg in dextrose 5% 200 mL IVPB  1,000 mg Intravenous Q12H Ralph Abreu MD   Stopped at 04/08/19 0953    [MAR Hold] cisatracurium besylate (NIMBEX) 200 mg in sodium chloride 0.9 % 100 mL infusion  2 mcg/kg/min Intravenous Continuous Tyler Becerra MD 4.8 mL/hr at 04/08/19 1150 2 mcg/kg/min at 04/08/19 1150    [MAR Hold] phenylephrine (GIANNA-SYNEPHRINE) 50 mg in dextrose 5 % 250 mL infusion  10 mcg/min Intravenous Continuous PRN Tyler Becerra MD 7.5 mL/hr at 04/07/19 1145 25 mcg/min at 04/07/19 1145    [MAR Hold] norepinephrine (LEVOPHED) 16 mg in dextrose 5% 250 mL infusion  0.03 mcg/kg/min Intravenous Continuous PRN Samanta Loaiza MD   Stopped at 04/08/19 0230    [MAR Hold] vasopressin 20 Units in dextrose 5 % 100 mL infusion  2 Units/hr Intravenous Continuous Samanta Loaiza MD   Stopped at 04/08/19 0700    [MAR Hold] propofol 1000 MG/100ML injection  10 mcg/kg/min Intravenous Titrated Samanta Loaiza MD 16.2 mL/hr at 04/08/19 0850 35 mcg/kg/min at 04/08/19 0850    [MAR Hold] insulin regular (HUMULIN R;NOVOLIN R) 100 Units in sodium chloride 0.9 % 100 mL infusion  1 Units/hr Intravenous Continuous Samanta Loaiza MD 1.9 mL/hr at 04/08/19 1209 1.86 Units/hr at 04/08/19 1209    [MAR Hold] EPINEPHrine (EPINEPHrine HCL) 5 mg in dextrose 5 % 250 mL infusion  0.03 mcg/kg/min Intravenous Continuous Samanta Loaiza MD 4.6 mL/hr at 04/08/19 1052 0.02 mcg/kg/min at 04/08/19 1052    [MAR Hold] amiodarone (CORDARONE) 450 mg in dextrose 5 % 250 mL infusion  0.5 mg/min Intravenous Continuous Samanta Loaiza MD 16.7 mL/hr at 04/08/19 0307 0.5 mg/min at 04/08/19 0307       Allergies:  Not on File    Problem List:    Patient Active Problem List   Diagnosis Code    Cardiac arrest [facility]) I46.9    Acute ST elevation myocardial infarction (STEMI) of anterior wall ([facility] I21.09    Severe left ventricular systolic dysfunction Y45.4    Acute respiratory failure with hypoxemia ([facility]) J96.01    Cardiogenic pulmonary edema ([facility]) I50.1    PAD (peripheral artery disease) ([facility]) I73.9    Subacute osteomyelitis of right tibia ([facility] M86.261    Exposed orthopaedic hardware ([facility] T84.638A       Past Medical History:        Diagnosis Date    CAD (coronary artery disease) 04/07/2019    [facility] 04/07/2019    Osteolysis 04/07/2019    R Knee hardware    PEA (Pulseless electrical activity) ([facility] 04/07/2019    PVD (peripheral vascular disease) ([facility] 04/07/2019       Past Surgical History:        Procedure Laterality Date    CARDIAC CATHETERIZATION  04/07/2019    MVD-Stv    CORONARY ARTERY BYPASS GRAFT  04/07/2019    x4 per Dr. Lawson Felty, [place] [address] [place] [address]      unsure if correct known tibial surgery    HARDWARE REMOVAL Right 04/07/2019    R Knee internal screw protruding through skin    TIBIA FRACTURE SURGERY Right     [employer] off roof, external fixation and internal hardware    TONSILLECTOMY         Social History:    Social History     Tobacco Use    Smoking status: Not on file   Substance Use Topics    Alcohol use: Not on file                                Counseling given: Not Answered      Vital Signs (Current): There were no vitals filed for this visit.                                            BP Readings from Last 3 Encounters:   04/08/19 (!) 113/59   04/07/19 (!) 39/22       NPO Status:                                                                                 BMI:   Wt Readings from Last 3 Encounters:   04/08/19 176 lb 9.4 oz (80.1 kg)     There is no height or weight on file to calculate BMI.    CBC:   Lab Results   Component Value Date    WBC 14.2 04/08/2019    RBC 3.32 04/08/2019    HGB 10.1 04/08/2019    HCT 31.4 04/08/2019    MCV 94.6 04/08/2019    RDW 13.6 04/08/2019    PLT See Reflexed IPF Result 04/08/2019       CMP:   Lab Results   Component Value Date     04/08/2019    K 4.0 04/08/2019     04/08/2019    CO2 21 04/08/2019    BUN 14 04/08/2019    CREATININE 0.63 04/08/2019    GFRAA >60 04/08/2019    LABGLOM >60 04/08/2019    GLUCOSE 97 04/08/2019    PROT 5.1 04/07/2019    CALCIUM 8.1 04/08/2019    BILITOT 0.71 04/07/2019    ALKPHOS 37 04/07/2019     04/07/2019    ALT 63 04/07/2019       POC Tests:   Recent Labs     04/07/19  1156  04/08/19  0835   POCGLU 154*   < > 93   POCNA 149*  --   --    POCK 3.6  --   --    POCCL 109*  --   --    POCHEMO 12.7*  --   -- POCHCT 37*  --   --     < > = values in this interval not displayed. Coags:   Lab Results   Component Value Date    PROTIME 13.3 04/08/2019    INR 1.3 04/08/2019    APTT 25.7 04/07/2019       HCG (If Applicable): No results found for: PREGTESTUR, PREGSERUM, HCG, HCGQUANT     ABGs: No results found for: PHART, PO2ART, OYE7TKG, DTD7CTT, BEART, C3ZGUKPU     Type & Screen (If Applicable):  No results found for: [facility]    Anesthesia Evaluation  Patient summary reviewed  Airway:        Comment: Oral intubated   Dental:          Pulmonary:   (+) COPD:  decreased breath sounds,                            ROS comment: Acute resp failure,  Intubated, sedated. Cardiovascular:  Exercise tolerance: poor (<4 METS),   (+) hypertension:, past MI:, CAD:, CABG/stent:, CHF:, hyperlipidemia      ECG reviewed  Rhythm: regular  Rate: normal                    Neuro/Psych:                ROS comment: sedated GI/Hepatic/Renal: Neg GI/Hepatic/Renal ROS            Endo/Other:    (+) Diabetes, . Abdominal:           Vascular:   + PVD, aortic or cerebral, . ROS comment: Severe pvd and LLE amp. Findings:     LMCA: has distal 60% stenosis. LAD: has ostial 90% stenosis and long proximal 80% stenosis. D1 has proximal 70% stenosis. LCx: has 20% stenosis. OM1 is very small. OM2 has proximal 90% stenosis. RCA: has proximal 50% stenosis, mid 60% stenosis and distal 80% stenosis. Ramus: has ostial 99% stenosis.     The LV gram was performed in the DUTTON 30 position. LVEF: 15%. LV Wall Motion: severe hypokinesis of the anterior wall and mid inferior wall with apical akinesis.       Conclusions:  1. Multivessel CAD  2. Severe LV systolic dysfunction  3. Mild aortic insufficiency  4. Distal aorta 40% stenosis  5. Occluded left external iliac artery  6. 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 Unable to pass IABP or Impella  7.  Overall preserved LV function Anesthesia Plan      general     ASA 5     (Lines are in-situ.)  Induction: intravenous. arterial line, BIS, central line, CVP and PA catheter  MIPS: Postoperative ventilation. Anesthetic plan and risks discussed with Unable to obtain due to emergent nature. Plan discussed with CRNA.           severe copd, intubated, severe dm with bs>500, pvd, unable to place iabp, chf lvef 15%, pea arrest upon arrival, s/p cprx2        Candy Duran MD   4/8/2019

## 2019-04-08 NOTE — PROGRESS NOTES
RN updated Dr. Michaela Roach and CT surgery team at bedside of acute events over night, VS, I/Os, arhythmias, current lab values, and running gtts, see mar. Per Dr. Michaela Roach- pt is a too fallow case; plan to close chest later today. No new orders at this time.

## 2019-04-08 NOTE — ANESTHESIA POSTPROCEDURE EVALUATION
Department of Anesthesiology  Postprocedure Note    Patient: Nixon Wing  MRN: [id number]  YOB: 1965  Date of evaluation: 4/8/2019  Time:  2:40 PM     Procedure Summary     Date:  04/08/19 Room / Location:  [facility] CVOR 20 / [facility] CVOR    Anesthesia Start:  1252 Anesthesia Stop:  1400    Procedure:  STERNAL  WOUND WASHOUT AND CLOSURE S/P CABG (N/A ) Diagnosis:  (S/P CABG)    Surgeon:  Mehdi Hernandez MD Responsible Provider:  Mariah Sinclair MD    Anesthesia Type:  general ASA Status:  5          Anesthesia Type: No value filed. Gricelda Phase I: Gricelda Score: 3    Gricelda Phase II:      Last vitals: Reviewed and per EMR flowsheets.        Anesthesia Post Evaluation    Patient location during evaluation: ICU  Patient participation: complete - patient cannot participate  Level of consciousness: sedated and ventilated  Pain score: 2  Nausea & Vomiting: no nausea and no vomiting  Complications: no  Cardiovascular status: hemodynamically stable  Respiratory status: acceptable, ventilator and intubated  Hydration status: stable

## 2019-04-08 NOTE — PROGRESS NOTES
RN updated Dr Mimi Tucker, [address] [facility] surgery, regarding recent AB and lab results. Discussed CTx2 output, VS, CO & CI, SVR, and runs of VT. Orders received for amio bolus and gtt protocol, transfuse 1 unit PRBCs if Hbg is less than 7.0. RN with Dr Mimi Tucker protocol for CPR if needed; complet CPR with one hand and depth for compressions like peds compressions.

## 2019-04-08 NOTE — PROGRESS NOTES
[facility]   Progress Note                   Date:   4/8/2019  Patient name: Ralph Duncan  Date of admission:  4/7/2019  4:48 AM  MRN:   [id number]  YOB: 1965  PCP: No primary care provider on file.     Reason for Admission:      Subjective:       POD#1( emergent CABG)    @ On paralytic  Runs of NSVT over night, ended up getting amiodarone started  Off the pressors, on minimum dose of epi  On paralytic  Open chest with no sternum closure post surgery    Medications:   Scheduled Meds:   sodium chloride flush  10 mL Intravenous 2 times per day    sodium chloride flush  10 mL Intravenous 2 times per day    docusate sodium  100 mg Oral BID    polyethylene glycol  17 g Oral Daily    [START ON 4/9/2019] famotidine  20 mg Oral BID    famotidine (PEPCID) injection  20 mg Intravenous BID    metoprolol tartrate  25 mg Oral BID    lisinopril  2.5 mg Oral Daily    amiodarone  200 mg Oral BID    chlorhexidine  15 mL Mouth/Throat BID    mupirocin   Nasal BID    therapeutic multivitamin-minerals  1 tablet Oral Daily with breakfast    atorvastatin  40 mg Oral Nightly    enoxaparin  40 mg Subcutaneous Daily    aspirin  81 mg Oral Daily    clopidogrel  75 mg Oral Daily    insulin lispro  0-12 Units Subcutaneous TID WC    insulin lispro  0-6 Units Subcutaneous Nightly    vancomycin (VANCOCIN) IV  1,000 mg Intravenous Q12H       Continuous Infusions:   sodium chloride 75 mL/hr at 04/07/19 1145    dextrose      cisatracurium (NIMBEX) infusion 2.5 mcg/kg/min (04/08/19 0824)    phenylephrine (GIANNA-SYNEPHRINE) 50mg/250mL infusion 25 mcg/min (04/07/19 1145)    norepinephrine Stopped (04/08/19 0230)    vasopressin (Septic Shock) infusion 1 Units/hr (04/07/19 1145)    propofol 35 mcg/kg/min (04/08/19 0231)    insulin (HUMAN R) non-weight based infusion 0.57 Units/hr (04/08/19 0631)    EPINEPHrine infusion 0.03 mcg/kg/min (04/08/19 0974)    amiodarone 0.5 mg/min (04/08/19 0307) CBC:   Recent Labs     04/07/19  1205 04/07/19 1947 04/07/19 2323 04/08/19 0445   WBC 26.4* 15.6*  --  14.2*   HGB 12.8* 10.6* 10.4* 10.1*    See Reflexed IPF Result  --  See Reflexed IPF Result     BMP:    Recent Labs     04/07/19  1205 04/07/19 1947 04/07/19 2323 04/08/19 0445   * 149* 147* 147*   K 3.7 3.9 4.2 4.0   * 116* 117* 117*   CO2 26 24 23 21   BUN 16 16  --  14   CREATININE 0.65* 0.66*  --  0.63*   GLUCOSE 155* 110*  --  97     Hepatic:   Recent Labs     04/07/19 1947   *   ALT 63*   BILITOT 0.71   ALKPHOS 37*     Troponin: No results for input(s): TROPONINI in the last 72 hours. BNP: No results for input(s): BNP in the last 72 hours. Lipids: No results for input(s): CHOL, HDL in the last 72 hours. Invalid input(s): LDLCALCU  INR:   Recent Labs     04/07/19  1205 04/07/19 1947 04/08/19 0445   INR 1.3 1.2 1.3       Objective:   Vitals: BP (!) 113/59   Pulse 80   Temp 99.6 °F (37.6 °C) (Oral)   Resp 16   Ht 5' 8\" (1.727 m)   Wt 176 lb 9.4 oz (80.1 kg)   SpO2 96%   BMI 26.85 kg/m²     General appearance: sedated and intubated  HEENT: Head: Normocephalic, no lesions, without obvious abnormality  Neck: no JVD  Lungs:  Mechanical ventilator  Heart: regular rate and rhythm, S1, S2 normal, no murmur, click, rub or gallop  Abdomen: soft, non-tender; bowel sounds normal  Extremities: No LE edema  Neurologic: paralytic      EKG: Sinus tachycardia/ Anterior septal infarct      Echocardiogram:  pending    4/7/19  Coronary Angiography:     LMCA: has distal 60% stenosis. LAD: has ostial 90% stenosis and long proximal 80% stenosis. D1 has proximal 70% stenosis. LCx: has 20% stenosis. OM1 is very small. OM2 has proximal 90% stenosis. RCA: has proximal 50% stenosis, mid 60% stenosis and distal 80% stenosis. Ramus: has ostial 99% stenosis.     The LV gram was performed in the DUTTON 30 position. LVEF: 15%.  LV Wall Motion: severe hypokinesis of the anterior wall and mid inferior wall with apical akinesis. Assessment:   1 PEA Arrest( Multiple times) ROSC in few minutes( on paralytic now)  2 Late presentation of Anterior septal STEMI   3 Multivessel CAD s/p emergent CABG( 4/7) ( LIMA-LAD, SVG-PDA,OM 1 & distal circumflex) 4/7  4 Ischemic cardiomyopathy with acute systolic CHF( ef 26% ANGELA)  5 NSVT  6 Post op anemia  7 Post op thrombocytopenia  8 Acute pulmonary edema S/p intubation      Treatment Plan:   1  Urgent CABG( 4/7). Off the vaso,levo. On minimum dose of epi. Post op EF is 40% per ANGELA note. Will recommend weaning off Epi. CVP is 18. Will need IV lasix 20 mg BID. Plan for sternum closure today. 2  Post op care per CT Surgery. 3  Continue with Asa, plavix and lipitor. 4  Amiodarone gtt for NSVT runs, can switch to PO amiodarone after the surgery. 5. Low dose BB( 12.5 BID) once off the pressors. 6  Keep K>4, Mag>2 and Calcium in normal range. 7  Follow CBC & BMP. Discussed with patient and nursing. Elmo Christopher MD  Fellow cardiology    Attending note,   The patient was seen and examined, agree with above, intubated, will continue IV amiodarone and current medications. Wean off pressors as tolerated.

## 2019-04-08 NOTE — PROGRESS NOTES
Physical Therapy  DATE: 2019    NAME: Mychal Beach  MRN: [id number]   : 1965    Patient not seen this date for Physical Therapy due to:  [] Blood transfusion in progress  [] Hemodialysis  []  Patient Declined  [] Spine Precautions   [] Strict Bedrest  [] Surgery/ Procedure  [] Testing      [x] Other- Pt not medically appropriate for therapy this date. PT will check back 19. [] PT being discontinued at this time. Patient independent. No further needs. [] PT being discontinued at this time as the patient has been transferred to palliative care. No further needs.     Ramos Hernandez, PT

## 2019-04-08 NOTE — OP NOTE
right side of the aorta, and  the vein graft was then spatulated, cut to appropriate length, sewn  end-to-side using a 6-0 Prolene suture and deaired with a short burst of  plege. The patient was given warm continuous blood, cross-clamp was  removed, the root was vented. The patient was then progressively slowly  weaned over approximately 1 hour to allow cardiac recovery and  reperfusion. After 1 hour of observation, we slowly integrated the  patient back at the circuit and progressively weaned the patient off  coronary bypass. The patient was started on low-dose Esequiel. Contractility slowly improved from initially 10% to 45% net. The  patient did not need to be paced at this time; however, a single  ventricular pacing wire was placed to the diaphragmatic surface of the  heart, placed through a separate stab incision, and secured to the skin  with 2-0 silk pop-off. Once the patient was adequately re-warmed and  good contractility, the patient subsequently was weaned off coronary  bypass. Test-dose protamine followed by full-dose protamine, and  subsequently decannulated. Sites were checked for hemostasis. Two  chest tubes were placed in the subxiphoid position using separate stab  incisions, secured to the skin with 2-0 silk pop-off. We evaluated the  patient for proximal chest closure; however, due to the cardiac edema of  surrounding tissue, we opted to leave the patient planned for a wound  VAC and return at a later date to allow for less compression on the  heart. A 1010 was then perforated and placed deep to the pericardial  well, placed around the heart and the grafts. A single chest tube was  placed deep, one superficially, and then Kerlix impregnated with iodine  diluted was then placed over the incision, and then Elvira Restrepo was covering  that with suction, which allowed for wound VAC evacuation type of  dressing to be performed.   This wound will remain sterile during the  observational phase with plans to return to surgery tomorrow for formal  closure. The patient's echo demonstrated 45% EF with good  contractility; otherwise, sats were not picking up adequately on the  monitor; however, repeated blood gases despite findings of saturations  in 92 indicated PaO2 greater than 200. This was on 100% on FiO2 on the  ventilator. The patient at this time is in critical condition, being  transferred to our ICU. Needle and sponge counts were correct at the  end of the case. Please Note:  Intraaortic balloon pump or supportive device was not  placed at this time due to the fact of no need. However, Anesthesia is  placing a Cordis in the IJ for potential use of [place] if the patient's  oxygenations deteriorate. We also have access to put the patient back  on ECMO if need be. If we have worsening cardiac function at a later  time, intraaortic pump would be ideal, but not placed at this time due  to the risk and severe peripheral vascular disease. Intrathoracic  position would be done through  separate tube graft; however, the risk  is not necessary at this time due to the patient having adequate  contractility and ejection. The patient's family had been notified, they arrived, and detailed  discussion of the operation and findings. The patient's condition is  closely guarded. This patient is in very critical condition. This is  due to salvage operation; however, the patient's overall functions seem  markedly improved and optimistic is for a potential full recovery.         Ying Kidd    D: 04/07/2019 11:39:38       T: 04/07/2019 18:51:49     [email or web address]  Job#: [id number]     Doc#: [id number]    CC:

## 2019-04-08 NOTE — PROGRESS NOTES
corresponding to a protruding orthopedic screw from a remote ORIF of the Rt  Tibial plateau. The screw has been removed and sent for culture. X rays show healed fracture of the proximal tibia. There is additional hardware in place that will likely need to be removed if patient survives. CURRENT EVALUATION : 4/8/2019    Patient back from OR after delayed primary closure of chest.  Sedated on vent    Afebrile  VS stable . BP supported with epi  Had problems with NSVT during the night. Suppressed with amiodarone     Looks better  Leg sinus tract clean    Cultures:  Wound:  · Orthopedic screw: pending    Discussed with RN. I have personally reviewed the past medical history, past surgical history, medications, social history, and family history, and I have updated the database accordingly.   Past Medical History:     Past Medical History:   Diagnosis Date    CAD (coronary artery disease) 04/07/2019    Cardiac arrest ([facility] 04/07/2019    Osteolysis 04/07/2019    R Knee hardware    PEA (Pulseless electrical activity) ([facility] 04/07/2019    PVD (peripheral vascular disease) ([facility] 04/07/2019       Past Surgical  History:     Past Surgical History:   Procedure Laterality Date    CARDIAC CATHETERIZATION  04/07/2019    MVD-Stv    CORONARY ARTERY BYPASS GRAFT  04/07/2019    x4 per Dr. Sue Troy, [address] [place] [address] GRAFT N/A 4/7/2019    EMERGENT CPR,  CORONARY ARTERY BYPASS X4, ON PUMP ,CHEST LEFT OPEN; SWAN, ANGELA PER ANESTHESIA performed by Maynor Barton MD at [address] [place] [address],[address]      unsure if correct known tibial surgery    HARDWARE REMOVAL Right 04/07/2019    R Knee internal screw protruding through skin    TIBIA FRACTURE SURGERY Right     Fell off roof, external fixation and internal hardware    TONSILLECTOMY         Medications:      vancomycin (VANCOCIN) intermittent dosing (placeholder)   Other RX Placeholder    sodium chloride flush  10 mL Intravenous 2 times per day    sodium chloride flush  10 mL Intravenous 2 times per day    docusate sodium  100 mg Oral BID    polyethylene glycol  17 g Oral Daily    [START ON 4/9/2019] famotidine  20 mg Oral BID    metoprolol tartrate  25 mg Oral BID    lisinopril  2.5 mg Oral Daily    amiodarone  200 mg Oral BID    chlorhexidine  15 mL Mouth/Throat BID    mupirocin   Nasal BID    therapeutic multivitamin-minerals  1 tablet Oral Daily with breakfast    atorvastatin  40 mg Oral Nightly    enoxaparin  40 mg Subcutaneous Daily    aspirin  81 mg Oral Daily    clopidogrel  75 mg Oral Daily    insulin lispro  0-12 Units Subcutaneous TID WC    insulin lispro  0-6 Units Subcutaneous Nightly    vancomycin (VANCOCIN) IV  1,000 mg Intravenous Q12H       Social History:     Social History     Socioeconomic History    Marital status: Unknown     Spouse name: Not on file    Number of children: Not on file    Years of education: Not on file    Highest education level: Not on file   Occupational History    Not on file   Social Needs    Financial resource strain: Not on file    Food insecurity:     Worry: Not on file     Inability: Not on file    Transportation needs:     Medical: Not on file     Non-medical: Not on file   Tobacco Use    Smoking status: Not on file   Substance and Sexual Activity    Alcohol use: Not on file    Drug use: Not on file    Sexual activity: Not on file   Lifestyle    Physical activity:     Days per week: Not on file     Minutes per session: Not on file    Stress: Not on file   Relationships    Social connections:     Talks on phone: Not on file     Gets together: Not on file     Attends Yazidi service: Not on file     Active member of club or organization: Not on file     Attends meetings of clubs or organizations: Not on file     Relationship status: Not on file    Intimate partner violence:     Fear of current or ex partner: Not on file     Emotionally abused: Not on file     Physically abused: Not on file     Forced sexual activity: Not on file   Other Topics Concern    Not on file   Social History Narrative    Not on file       Family History:   No family history on file. Allergies:   Patient has no allergy information on record. Review of Systems:   Unable to provide. Sedated on ventilator. Physical Examination :     Patient Vitals for the past 8 hrs:   Pulse SpO2   04/08/19 1515 77 99 %   04/08/19 1505 77 98 %   04/08/19 1500 77 98 %   04/08/19 1445 78 99 %   04/08/19 1430 78 99 %   04/08/19 1415 76 98 %   04/08/19 1414 76 98 %   04/08/19 1400 -- 99 %   04/08/19 1359 73 100 %   04/08/19 1119 83 98 %   04/08/19 0747 80 96 %     General Appearance: Sedated on vent  Head:  Normocephalic, no trauma  Eyes: Pupils equal, round, reactive to light; sclera anicteric; conjunctivae pink. No embolic phenomena. ENT: Oropharynx clear, without erythema, exudate, or thrush. No tenderness of sinuses. Mouth/throat: mucosa pink and moist. No lesions. Dentition in good repair. Neck:Supple, without lymphadenopathy. Thyroid normal, No bruits. Pulmonary/Chest: Coarse sounds  Cardiovascular: Regular rate and rhythm without murmurs, rubs, or gallops. Abdomen: Soft, non tender. Bowel sounds normal. No organomegaly  All four Extremities: No cyanosis, clubbing, edema, or effusions. Neurologic: Unable to assess. Sedated. Skin: Warm and dry with good turgor. Signs of peripheral arterial insufficiency. Rt lateral knee with open incision with old blood at site of prior protruding screw. Chest incision closed.     Medical Decision Making -Laboratory:   I have independently reviewed/ordered the following labs:    CBC with Differential:   Recent Labs     04/07/19  0503  04/07/19  1947 04/07/19  2323 04/08/19  0445   WBC 17.5*   < > 15.6*  --  14.2*   HGB 15.7   < > 10.6* 10.4* 10.1*   HCT 51.9*   < > 32.1* 32.0* 31.4*      < > See Reflexed IPF Result  --  See Reflexed IPF Result   LYMPHOPCT 33  --   -- --   --    MONOPCT 1  --   --   --   --     < > = values in this interval not displayed. BMP:   Recent Labs     04/07/19 1947 04/07/19  2323 04/08/19  0445   * 147* 147*   K 3.9 4.2 4.0   * 117* 117*   CO2 24 23 21   BUN 16  --  14   CREATININE 0.66*  --  0.63*   MG 2.3 2.2 2.1     Hepatic Function Panel:   Recent Labs     04/07/19 1947   PROT 5.1*   LABALBU 3.8   BILIDIR 0.18   IBILI 0.53   BILITOT 0.71   ALKPHOS 37*   ALT 63*   *     No results for input(s): RPR in the last 72 hours. No results for input(s): HIV in the last 72 hours. No results for input(s): BC in the last 72 hours. Lab Results   Component Value Date    RBC 3.32 04/08/2019    WBC 14.2 04/08/2019     Lab Results   Component Value Date    CREATININE 0.63 04/08/2019    GLUCOSE 97 04/08/2019       Medical Decision Making-Imaging:     EXAMINATION:   3 XRAY VIEWS OF THE RIGHT KNEE       4/7/2019 5:00 pm       COMPARISON:   04/07/2019       HISTORY:   ORDERING SYSTEM PROVIDED HISTORY: s/p Hardware removal at bedside   TECHNOLOGIST PROVIDED HISTORY:   s/p Hardware removal at bedside       FINDINGS:   Interval removal of anterior superior fixating screw.  No periprosthetic   fracture.  Chronic deformity of the proximal fibula.  Degenerative changes of   knee and tib-fib syndesmosis.         Impression   Removal of anterior-superior fixating screw without interval change otherwise     EXAMINATION:   3 XRAY VIEWS OF THE RIGHT KNEE       4/7/2019 3:28 pm       COMPARISON:   None.       HISTORY:   ORDERING SYSTEM PROVIDED HISTORY: Trauma/Fracture   TECHNOLOGIST PROVIDED HISTORY:   Trauma/Fracture       FINDINGS:   Sideplate and 4 screws transfix the proximal tibia laterally.  Old healed   fracture proximal fibular neck and head.  Spurring lateral tibia plateau.    Proximal tibiofibular syndesmosis.  Visualized femur and patella normal.   Soft tissues unremarkable.           Impression   ORIF proximal tibia.  No acute fracture.     Medical Decision Making-Other: Thank you for allowing us to participate in the care of this patient. Please call with questions.     Elizabeth Perez MD  Pager: [phone] - Office: [phone]

## 2019-04-08 NOTE — PROGRESS NOTES
Vascular Surgery Progress Note         PATIENT NAME: Tracey Orellana     TODAY'S DATE: 4/8/2019, 7:33 AM    CC:    SUBJECTIVE:    Pt seen and examined. Pressor amount decrease overnight. Otherwise pt remains in critical condition. Plan for OR today with CT Sx. OBJECTIVE:   Vitals:  BP (!) 113/59   Pulse 87   Temp 99.6 °F (37.6 °C) (Oral)   Resp 16   Ht 5' 8\" (1.727 m)   Wt 176 lb 9.4 oz (80.1 kg)   SpO2 96%   BMI 26.85 kg/m²      INTAKE/OUTPUT:      Intake/Output Summary (Last 24 hours) at 4/8/2019 0733  Last data filed at 4/8/2019 0600  Gross per 24 hour   Intake 5594 ml   Output 2530 ml   Net 3064 ml                 General appearance - Intubated and sedated  Neck - supple,no JVD  Chest - wound vac intact w/ X2 chest tubes  Heart - s1+s2  Abdomen - soft, nd  Neurological - sedated  Extremities - Signals to R DP and L PT. Signal to b/l pop a. pallpable b/l femoral. Hair present on b/l shins, no opens wound or evidence of venous stasis. Cap refill < 3 s. Feet warm to touch. R fem a cath intact. Dressing to LLE c/d/i.    Skin - no gross lesions, rashes, or induration noted    Data:  CBC with Differential:    Lab Results   Component Value Date    WBC 14.2 04/08/2019    RBC 3.32 04/08/2019    HGB 10.1 04/08/2019    HCT 31.4 04/08/2019    PLT See Reflexed IPF Result 04/08/2019    MCV 94.6 04/08/2019    MCH 30.4 04/08/2019    MCHC 32.2 04/08/2019    RDW 13.6 04/08/2019    LYMPHOPCT 33 04/07/2019    MONOPCT 1 04/07/2019    BASOPCT 0 04/07/2019    MONOSABS 0.18 04/07/2019    LYMPHSABS 5.78 04/07/2019    EOSABS 0.53 04/07/2019    BASOSABS 0.00 04/07/2019    DIFFTYPE NOT REPORTED 04/07/2019     CMP:    Lab Results   Component Value Date     04/08/2019    K 4.0 04/08/2019     04/08/2019    CO2 21 04/08/2019    BUN 14 04/08/2019    CREATININE 0.63 04/08/2019    GFRAA >60 04/08/2019    LABGLOM >60 04/08/2019    GLUCOSE 97 04/08/2019    PROT 5.1 04/07/2019    LABALBU 3.8 04/07/2019    CALCIUM 8.1 04/08/2019 BILITOT 0.71 04/07/2019    ALKPHOS 37 04/07/2019     04/07/2019    ALT 63 04/07/2019     PT/INR:    Lab Results   Component Value Date    PROTIME 13.3 04/08/2019    INR 1.3 04/08/2019     ASSESSMENT   1. PVD  2. CAD s/p cabg X 4 vessels  3. S/p Cardiac cath     PLAN  1. At current time recommend continuing supportive care as patient is critical.   2. Tawnya hugger to b/l, off loading  3. Wean pressors as tolerated  4.  Once pt stabilizes will get vito and pvr    Discussed with Dr. Vera Lei    Electronically signed by Corrina Rowan DO  on 4/8/2019 at 7:33 AM

## 2019-04-08 NOTE — PROGRESS NOTES
Rn updated Dr Valorie Claros, cardiology fellow of acute events over night, VS, recent lab values, I/O and current medications; see MAR. RN updated Dr Valorie Claros on plan for OR later today.     No new orders at this time

## 2019-04-08 NOTE — PROGRESS NOTES
INTENSIVE CARE UNIT  Resident Physician Progress Note    Patient - Karrie Zapata  Date of Admission -  2019  4:48 AM  Date of Evaluation -  2019  Room and Bed Number -  1012/1012-01   Hospital Day - 1      SUBJECTIVE:     OVERNIGHT EVENTS:      Started on amiodarone drip for recurrent nonsustained SVTs  On minimum dose of epinephrine  In used to be on paralytic  External screw taken off by orthopedic surgery yesterday  Plan of closing the sternum today  Continues to be an insulin drip  FiO2 decreased to 40 from 80 rate decreased from 28-16 tidal volume increased from 550 60 ABG is normal    TODAY:      AWAKE & FOLLOWING COMMANDS:  [x] No   [] Yes    SECRETIONS Amount:  [x] Small [] Moderate  [] Large  [] None  Color:     [x] White [] Colored  [] Bloody    SEDATION:  RAAS Score:  [x] Propofol gtt  [] Versed gtt  [] Ativan gtt   [] No Sedation    PARALYZED:  [] No    [x] Yes    VASOPRESSORS:  [] No    [x] Yes  [] Levophed [] Dopamine [] Vasopressin  [] Dobutamine [] Phenylephrine [] Epinephrine      OBJECTIVE:     VITAL SIGNS:  BP (!) 113/59   Pulse 81   Temp 99.6 °F (37.6 °C) (Oral)   Resp 16   Ht 5' 8\" (1.727 m)   Wt 176 lb 9.4 oz (80.1 kg)   SpO2 99%   BMI 26.85 kg/m²   Tmax over 24 hours:  Temp (24hrs), Av.4 °F (37.4 °C), Min:99.3 °F (37.4 °C), Max:99.6 °F (37.6 °C)      Patient Vitals for the past 8 hrs:   Pulse SpO2   19 1615 81 99 %   19 1600 81 100 %   19 1545 81 99 %   19 1530 80 98 %   19 1515 77 99 %   19 1505 77 98 %   19 1500 77 98 %   19 1445 78 99 %   19 1430 78 99 %   19 1415 76 98 %   19 1414 76 98 %   19 1400 -- 99 %   19 1359 73 100 %   19 1119 83 98 %         Intake/Output Summary (Last 24 hours) at 2019 1641  Last data filed at 2019 1600  Gross per 24 hour   Intake 4344 ml   Output 2550 ml   Net 1794 ml     Date 19 0000 - 19 2359   Shift [id number] 0750-3730 7359-6118 24 Hour Infusions:   sodium chloride 75 mL/hr at 04/07/19 1145    dextrose      phenylephrine (GIANNA-SYNEPHRINE) 50mg/250mL infusion Stopped (04/08/19 1418)    norepinephrine Stopped (04/08/19 0230)    vasopressin (Septic Shock) infusion Stopped (04/08/19 0700)    propofol 35 mcg/kg/min (04/08/19 1559)    insulin (HUMAN R) non-weight based infusion 1.14 Units/hr (04/08/19 1600)    EPINEPHrine infusion 0.02 mcg/kg/min (04/08/19 1400)    amiodarone 0.5 mg/min (04/08/19 0307)     PRN Meds:     sodium chloride flush 10 mL PRN   magnesium hydroxide 30 mL Daily PRN   sodium chloride flush 10 mL PRN   calcium chloride IVPB 1 g PRN   magnesium sulfate 1 g PRN   potassium chloride 20 mEq PRN   acetaminophen 650 mg Q4H PRN   acetaminophen 650 mg Q4H PRN   oxyCODONE-acetaminophen 1 tablet Q4H PRN   Or     oxyCODONE-acetaminophen 2 tablet Q4H PRN   fentanNYL 25 mcg Q1H PRN   Or     fentanNYL 50 mcg Q1H PRN   diphenhydrAMINE 25 mg Nightly PRN   bisacodyl 10 mg Daily PRN   ondansetron 4 mg Q8H PRN   hydrALAZINE 5 mg Q5 Min PRN   albumin human 25 g PRN   glucose 15 g PRN   dextrose 12.5 g PRN   glucagon (rDNA) 1 mg PRN   dextrose 100 mL/hr PRN   phenylephrine (GIANNA-SYNEPHRINE) 50mg/250mL infusion 10 mcg/min Continuous PRN   norepinephrine 0.03 mcg/kg/min Continuous PRN       SUPPORT DEVICES: [x] Ventilator [] BIPAP  [] Nasal Cannula [] Room Air    VENT SETTINGS (Comprehensive) (if applicable):   PRVC mode, FiO2 40%, PEEP 5, Respiratory Rate 16, Tidal Volume 660  Vent Information  $Ventilation: $Subsequent Day  Ventilator Started: Yes  Skin Assessment: Clean, dry, & intact  Equipment Changed: Expiratory Filter  Vent Type: Servo i  Vent Mode: PRVC  Vt Ordered: 660 mL  Rate Set: 16 bmp  FiO2 : 40 %  Sensitivity: 5  PEEP/CPAP: 5  I Time/ I Time %: 0.9 s  Humidification Source: Heated wire  Humidification Temp Measured: 36  Nitric Oxide/Epoprostenol In Use?: No  Additional Respiratory  Assessments  Pulse: 81  Resp: 16  SpO2: 99 %  End Tidal CO2: 27 (%)  Position: Supine  Humidification Source: Heated wire  Oral Care Completed?: Yes  Oral Care: Mouth suctioned, Suction toothette  Subglottic Suction Done?: No  Skin barrier applied: Yes    ABGs:   7.39/36.3/95/22. 2.     Lab Results   Component Value Date    OOV6OQB 23 04/08/2019    FIO2 40.0 04/08/2019         DATA:  Complete Blood Count: Recent Labs     04/07/19  1205 04/07/19 1947 04/07/19 2323 04/08/19  0445   WBC 26.4* 15.6*  --  14.2*   RBC 4.16* 3.47*  --  3.32*   HGB 12.8* 10.6* 10.4* 10.1*   HCT 39.4* 32.1* 32.0* 31.4*   MCV 94.7 92.5  --  94.6   MCH 30.8 30.5  --  30.4   MCHC 32.5 33.0  --  32.2   RDW 13.1 13.2  --  13.6    See Reflexed IPF Result  --  See Reflexed IPF Result   MPV 10.1 NOT REPORTED  --  NOT REPORTED        Last 3 Blood Glucose:   Recent Labs     04/07/19  0503 04/07/19 1205 04/07/19 1947 04/08/19  0445   GLUCOSE 573* 155* 110* 97        PT/INR:    Lab Results   Component Value Date    PROTIME 13.3 04/08/2019    INR 1.3 04/08/2019     PTT:    Lab Results   Component Value Date    APTT 25.7 04/07/2019       Comprehensive Metabolic Profile:   Recent Labs     04/07/19  1205 04/07/19 1947 04/07/19 2323 04/08/19  0445   * 149* 147* 147*   K 3.7 3.9 4.2 4.0   * 116* 117* 117*   CO2 26 24 23 21   BUN 16 16  --  14   CREATININE 0.65* 0.66*  --  0.63*   GLUCOSE 155* 110*  --  97   CALCIUM 8.1* 8.1*  --  8.1*   PROT  --  5.1*  --   --    LABALBU  --  3.8  --   --    BILITOT  --  0.71  --   --    ALKPHOS  --  37*  --   --    AST  --  148*  --   --    ALT  --  63*  --   --       Magnesium:   Lab Results   Component Value Date    MG 2.1 04/08/2019    MG 2.2 04/07/2019    MG 2.3 04/07/2019     Phosphorus: No results found for: PHOS  Ionized Calcium:   Lab Results   Component Value Date    CAION 1.01 04/07/2019        Urinalysis: No results found for: NITRU, COLORU, PHUR, LABCAST, WBCUA, RBCUA, MUCUS, TRICHOMONAS, YEAST, BACTERIA, CLARITYU, SPECGRAV, LEUKOCYTESUR, UROBILINOGEN, BILIRUBINUR, BLOODU, GLUCOSEU, KETUA, AMORPHOUS    HgBA1c:    Lab Results   Component Value Date    LABA1C 10.8 04/07/2019     TSH:  No results found for: TSH  Lactic Acid: No results found for: LACTA   Troponin: No results for input(s): TROPONINI in the last 72 hours. Microbiology:  Swab from chest showing few neutrophils no bacteria      Radiology/Imaging:  FINDINGS:   Intact median sternotomy wires.  Midline skin staples.  ET tube in good   position.  Mediastinal drain present.  Enteric tube within the expected   location of the stomach.  Maud-Carly catheter within the right main pulmonary   artery as demonstrated previously.       Stable cardiomegaly.  Asymmetric pulmonary edema right greater than left,   similar to prior.  Small left effusion.  No pneumothorax.  Stable   retrocardiac atelectasis.         Impression   Similar bilateral asymmetric pulmonary edema right greater than left. ASSESSMENT:     Patient Active Problem List    Diagnosis Date Noted    Cardiac arrest ([facility] 04/07/2019    Severe left ventricular systolic dysfunction 32/79/7485    Acute respiratory failure with hypoxemia (HCC) 04/07/2019    Cardiogenic pulmonary edema ([facility] 04/07/2019    PAD (peripheral artery disease) ([facility] 04/07/2019    Acute ST elevation myocardial infarction (STEMI) of anterior wall (HCC)     Subacute osteomyelitis of right tibia ([facility]     Exposed orthopaedic hardware ([facility]           PLAN:     WEAN PER PROTOCOL:  [x] No   [] Yes  [] N/A    ICU PROPHYLAXIS:  Stress ulcer:  [x] PPI Agent  [] [id number] [] Sucralfate  [] Other:  VTE:   [x] Enoxaparin  [] Unfract.  Heparin Subcut  [] EPC Cuffs    NUTRITION:  [x] NPO [] Tube Feeding (Specify: ) [] TPN  [] PO    HOME MEDS RECONCILED: [x] No  [] Yes    CONSULTATION NEEDED:  [] No  [x] Yes    FAMILY UPDATED:    [x] No  [] Yes    TRANSFER OUT OF ICU:   [x] No  [] Yes        Additional Assessment:  ·           Plan:  ·             Electronically signed by Bret Frazier MD on 4/8/2019 at 4:41 PM    4/8/2019 4:41 PM

## 2019-04-08 NOTE — ANESTHESIA POSTPROCEDURE EVALUATION
Department of Anesthesiology  Postprocedure Note    Patient: Rosangela Beck  MRN: [id number]  [place]: 1965  Date of evaluation: 4/8/2019  Time:  7:55 AM     Procedure Summary     Date:  04/07/19 Room / Location:  [place] [address] / [facility]    Anesthesia Start:  0709 Anesthesia Stop:  [phone]    Procedure:  EMERGENT CPR,  CORONARY ARTERY BYPASS X4, ON PUMP ,CHEST LEFT OPEN; SWAN, ANGELA PER ANESTHESIA (N/A ) Diagnosis:  (SEVERE C.A.D.)    Surgeon:  Oniel Reagan MD Responsible Provider:  Shaneka Pack MD    Anesthesia Type:  general ASA Status:  4 - Emergent          Anesthesia Type: general    Gricelda Phase I: Gricelda Score: 3    Gricelda Phase II:      Last vitals: Reviewed and per EMR flowsheets.        Anesthesia Post Evaluation    Patient location during evaluation: ICU  Patient participation: complete - patient cannot participate  Level of consciousness: sedated and ventilated  Pain score: 0  Airway patency: patent  Nausea & Vomiting: no nausea and no vomiting  Complications: no  Cardiovascular status: blood pressure returned to baseline  Respiratory status: intubated  Hydration status: euvolemic  Comments:   Mechanical Ventilation Data  SETTINGS (Comprehensive)  Vent Information  $Ventilation: $Subsequent Day  Ventilator Started: Yes  Skin Assessment: Clean, dry, & intact  Equipment Changed: Expiratory Filter  Vent Type: Servo i  Vent Mode: PRVC  Vt Ordered: 660 mL  Rate Set: 16 bmp  FiO2 : 40 %  Sensitivity: 5  PEEP/CPAP: 5  I Time/ I Time %: 0.9 s  Humidification Source: HME  Nitric Oxide/Epoprostenol In Use?: No  Additional Respiratory  Assessments  Pulse: 80  Resp: 16  SpO2: 96 %  End Tidal CO2: 27 (%)  Position: Supine  Humidification Source: HME  Oral Care Completed?: Yes  Oral Care: Mouth suctioned, Suction toothette  Subglottic Suction Done?: Yes  Skin barrier applied: No    ABG   No results found for: PH, PCO2, PO2, HCO3, O2SAT  Lab Results       Component                Value               Date MODE                     NOT REPORTED        04/08/2019

## 2019-04-08 NOTE — PROGRESS NOTES
RN updated Dr Tara Reilly. Pastor Truong Resident regarding acute events over night, VS,recent labs and current plan of care. Orders received for daily wet to dry dressing change of R knee wound.     Per Joe Baum- pt able to ambulate an bare weight on R leg

## 2019-04-08 NOTE — FLOWSHEET NOTE
Basye box inadvertintly disconnected causing all unsaved data to be lost. Span is from early this morning until the patient left to the OR.

## 2019-04-08 NOTE — CARE COORDINATION
Case Management Initial Discharge Plan  ,             Met with:family member roddy Moffett to discuss discharge plans. Information verified: address, contacts, phone number, , insurance Yes  PCP: No primary care provider on file. Date of last visit:     Insurance Provider: none    Discharge Planning    Living Arrangements:  Other (Comment)   Support Systems:  Family Members, Friends/Neighbors, Children    Home has  stories   stairs to climb to get into front door, stairs to climb to reach second floor  Location of bedroom/bathroom in home     Patient able to perform ADL's:Independent    Current Services (outpatient & in home) none  DME equipment: none  DME provider: none    Pharmacy: none   Potential Assistance Purchasing Medications:  Yes  Does patient want to participate in local refill/ meds to beds program?  Yes    Potential Assistance Needed:  Prescription Assistance, [facility]    Patient agreeable to home care: tbd  [place] of choice provided:  tbd    Prior SNF/Rehab Placement and Facility: yes  Agreeable to SNF/Rehab: tbd  [place] of choice provided: tbd   Evaluation:     Expected Discharge date:     Patient expects to be discharged to:  tbd  Follow Up Appointment: Best Day/ Time:      Transportation provider: may need transportation  Transportation arrangements needed for discharge: Yes or No    Readmission Risk              Risk of Unplanned Readmission:        16             Does patient have a readmission risk score greater than 14?: Yes  If yes, follow-up appointment must be made within 7 days of discharge. Discharge Plan: met with roddy Moffett as patient is intubated and sedated with an open chest at this time. Plan is to return to OR today to close chest.  Discussed possibility of needs with son.   Follow for needs          Electronically signed by Ryland Henderson RN on 19 at 12:36 PM

## 2019-04-08 NOTE — PLAN OF CARE
Problem: OXYGENATION/RESPIRATORY FUNCTION  Goal: Patient will maintain patent airway  4/7/2019 2112 by Lorraine Rincon RCP  Outcome: Ongoing     Problem: OXYGENATION/RESPIRATORY FUNCTION  Goal: Patient will achieve/maintain normal respiratory rate/effort  Description  Respiratory rate and effort will be within normal limits for the patient  4/7/2019 2112 by Lorraine Rincon RCP  Outcome: Ongoing     Problem: MECHANICAL VENTILATION  Goal: Patient will maintain patent airway  4/7/2019 2112 by Lorraine Rincon RCP  Outcome: Ongoing     Problem: MECHANICAL VENTILATION  Goal: Oral health is maintained or improved  4/7/2019 2112 by Lorraine Rincon RCP  Outcome: Ongoing     Problem: MECHANICAL VENTILATION  Goal: ET tube will be managed safely  4/7/2019 2112 by Lorraine Rincon RCP  Outcome: Ongoing     Problem: MECHANICAL VENTILATION  Goal: Ability to express needs and understand communication  4/7/2019 2112 by Lorraine Rincon RCP  Outcome: Ongoing     Problem: MECHANICAL VENTILATION  Goal: Mobility/activity is maintained at optimum level for patient  4/7/2019 2112 by Lorraine Rincon RCP  Outcome: Ongoing

## 2019-04-08 NOTE — PROGRESS NOTES
[id number] [facility]  Progress Note    4/8/2019 6:57 AM  Surgeon:  Dr. Rae Molina     POD# 1   S/P :  Emerg Coronary artery bypass  EF: 45 %    Subjective:  Mr. Rosie Jauregui seen and examined today. Sedated, paralyzed Gtt epi, amio, insulin    Vital Signs: BP (!) 113/59   Pulse 87   Temp 99.6 °F (37.6 °C) (Oral)   Resp 16   Ht 5' 8\" (1.727 m)   Wt 176 lb 9.4 oz (80.1 kg)   SpO2 96%   BMI 26.85 kg/m²      Admit Weight: Weight: 176 lb 5.9 oz (80 kg)   WEIGHTWeight: 176 lb 9.4 oz (80.1 kg)     Rhythm: NSR    Labs:   CBC:   Recent Labs     04/07/19 1205 04/07/19 1947 04/07/19 2323 04/08/19 0445   WBC 26.4* 15.6*  --  14.2*   HGB 12.8* 10.6* 10.4* 10.1*   HCT 39.4* 32.1* 32.0* 31.4*   MCV 94.7 92.5  --  94.6    See Reflexed IPF Result  --  See Reflexed IPF Result     BMP:   Recent Labs     04/07/19 1205 04/07/19 1947 04/07/19 2323 04/08/19 0445   * 149* 147* 147*   K 3.7 3.9 4.2 4.0   * 116* 117* 117*   CO2 26 24 23 21   BUN 16 16  --  14   CREATININE 0.65* 0.66*  --  0.63*     PT/INR:   Recent Labs     04/07/19 1205 04/07/19 1947 04/08/19 0445   PROTIME 13.8* 12.6* 13.3*   INR 1.3 1.2 1.3     APTT:   Recent Labs     04/07/19 0503 04/07/19 1205   APTT 26.3 25.7       Chest X-Ray:  Endotracheal tube tip remains above the ramu.  Left subclavian approach  pulmonary artery catheter with the tip over the right main pulmonary artery. Mediastinal drains and left chest tube.  No pneumothorax.  There continue to  be bilateral perihilar opacities, not significantly changed.  Increasing left  basilar opacity.  Cardiac silhouette is unremarkable. I/O:  I/O last 3 completed shifts: In: 3579 [I.V.:3424; Blood:250; NG/GT:140]  Out: 2055 [Urine:1910;  Chest Tube:145]  Air Leak:  No air leak     Scheduled Meds:    sodium chloride flush  10 mL Intravenous 2 times per day    sodium chloride flush  10 mL Intravenous 2 times per day    docusate sodium  100 mg Oral BID    polyethylene glycol 17 g Oral Daily    [START ON 4/9/2019] famotidine  20 mg Oral BID    famotidine (PEPCID) injection  20 mg Intravenous BID    metoprolol tartrate  25 mg Oral BID    lisinopril  2.5 mg Oral Daily    amiodarone  200 mg Oral BID    chlorhexidine  15 mL Mouth/Throat BID    mupirocin   Nasal BID    therapeutic multivitamin-minerals  1 tablet Oral Daily with breakfast    atorvastatin  40 mg Oral Nightly    enoxaparin  40 mg Subcutaneous Daily    aspirin  81 mg Oral Daily    clopidogrel  75 mg Oral Daily    insulin lispro  0-12 Units Subcutaneous TID     insulin lispro  0-6 Units Subcutaneous Nightly    vancomycin (VANCOCIN) IV  1,000 mg Intravenous Q12H     Continuous Infusions:    sodium chloride 75 mL/hr at 04/07/19 1145    dextrose      cisatracurium (NIMBEX) infusion 3 mcg/kg/min (04/08/19 0401)    phenylephrine (GIANNA-SYNEPHRINE) 50mg/250mL infusion 25 mcg/min (04/07/19 1145)    norepinephrine Stopped (04/08/19 0230)    vasopressin (Septic Shock) infusion 1 Units/hr (04/07/19 1145)    propofol 35 mcg/kg/min (04/08/19 0231)    insulin (HUMAN R) non-weight based infusion 0.57 Units/hr (04/08/19 0631)    EPINEPHrine infusion 0.03 mcg/kg/min (04/08/19 4658)    amiodarone 0.5 mg/min (04/08/19 0307)       Exam:   General: alert and oriented to person, place and time, well-developed and well-nourished, in no acute distress  Heart:Normal S1 and S2.  Regular rhythm. No murmurs, gallops, or rubs. , Pacing wires  Yes  Lungs: diminished breath sounds bilaterally Equal and symmetric expansion with respiration. Chest tubes to suction  Abdomen: soft, non tender, non distended, BSx4  Extremities: 1+ edema, intact pulses in all four extremities  Musculoskeletal:  Intact range of motion of peripheral joints. paralyzed  Neurologic:  Non-focal sensory deficits on exam.  Psychiatric: Mood and affect are appropriate.   Wounds: clean and dry, healing appropriately, chest open      Assessment/Plan:   Patient Active Problem List   Diagnosis    Cardiac arrest ([facility]    Acute ST elevation myocardial infarction (STEMI) of anterior wall (HCC)    Severe left ventricular systolic dysfunction    Acute respiratory failure with hypoxemia (HCC)    Cardiogenic pulmonary edema (HCC)    PAD (peripheral artery disease) ([facility]    Subacute osteomyelitis of right tibia ([facility]    Exposed orthopaedic hardware ([facility]       1. S/p ECABG   POD 1  Plan for chest closure today  2. HD - stable, Vtach, Amio gtt, epi 0.03  3. Pulmo - sedated, intubated   4. GI - +BS, lactic acid wnl  5.  - good UOP  6. Neuro - paralyzed, sedated  7. ABLA - stable  PAD - Vascular on board  S/p removal of prosthetic screw from rt proximal tibia - waiting for culture  ID on board - Vanco    DVT ppx: Lovenox & SCD's while stationary  Plan for discharge when stable    The above recommendations including medications and orders were discussed and agreed upon with  the attending on service for the cardiothoracic surgery group today.     Electronically signed by CINDY Herman on 4/8/2019 at 6:57 AM

## 2019-04-08 NOTE — PROGRESS NOTES
Progress Note    Patient:  Shanell Richardson  YOB: 1965     46 y.o. male    Subjective:  Patient seen and examined at bedside this morning. Intubated and sedated. Stable overnight per nursing on one presser. Objective:   Vitals:    04/08/19 0345   BP:    Pulse: 87   Resp:    Temp:    SpO2: 96%     Gen: NAD, cooperative  MSK: Dressing in place to RLE, C/D/I. Toes warm and well perfused this morning. Unable to assess neuro motorsensory status due to clinical exam    Recent Labs     04/07/19 1947 04/07/19  2323   WBC 15.6*  --    HGB 10.6* 10.4*   HCT 32.1* 32.0*   PLT See Reflexed IPF Result  --    INR 1.2  --    * 147*   K 3.9 4.2   BUN 16  --    CREATININE 0.66*  --    GLUCOSE 110*  --        Meds: Vancomycin, ASA, Lovenox  See rec for complete list    Assessment/Plan : 46 y.o. male being seen for R knee infected hardware and likely proximal tibia osteomyelitis. S/p bedside screw removal, 4/7    -When medically optimized will need hardware removal from R proximal tibia. This can be done as an outpatient.  -Continue IV antibiotics per infectious disease  -Awaiting culture results from R screw removal  -Weight Bearing: as tolerated RLE  -Pain control and medical management per primary team  -Tolerating PO intake well  -Ice (20 min, 1 hour off) and elevation for edema/pain control  -Encourage IS and deep breathing  -DVT ppx:  Ok with chemical AC form orthopedic perspective  -PT/OT to evaluate and treat when able to participate  -Please page ortho with any questions      --------------------------------------------------------------  Sudheer Varma DO, PGY-2  [facility]) Orthopedic Surgery   5:06 AM 04/08/19

## 2019-04-09 ENCOUNTER — APPOINTMENT (OUTPATIENT)
Dept: GENERAL RADIOLOGY | Age: 54
DRG: 165 | End: 2019-04-09
Payer: MEDICAID

## 2019-04-09 LAB
ALLEN TEST: ABNORMAL
ANION GAP SERPL CALCULATED.3IONS-SCNC: 10 MMOL/L (ref 9–17)
ANION GAP SERPL CALCULATED.3IONS-SCNC: 8 MMOL/L (ref 9–17)
BUN BLDV-MCNC: 19 MG/DL (ref 6–20)
BUN BLDV-MCNC: 20 MG/DL (ref 6–20)
BUN/CREAT BLD: ABNORMAL (ref 9–20)
BUN/CREAT BLD: ABNORMAL (ref 9–20)
CALCIUM IONIZED: 0.97 MMOL/L (ref 1.13–1.33)
CALCIUM SERPL-MCNC: 7.4 MG/DL (ref 8.6–10.4)
CALCIUM SERPL-MCNC: 8 MG/DL (ref 8.6–10.4)
CHLORIDE BLD-SCNC: 115 MMOL/L (ref 98–107)
CHLORIDE BLD-SCNC: 115 MMOL/L (ref 98–107)
CO2: 15 MMOL/L (ref 20–31)
CO2: 18 MMOL/L (ref 20–31)
CREAT SERPL-MCNC: 0.86 MG/DL (ref 0.7–1.2)
CREAT SERPL-MCNC: 0.9 MG/DL (ref 0.7–1.2)
FIO2: 45
GFR AFRICAN AMERICAN: >60 ML/MIN
GFR AFRICAN AMERICAN: >60 ML/MIN
GFR NON-AFRICAN AMERICAN: >60 ML/MIN
GFR NON-AFRICAN AMERICAN: >60 ML/MIN
GFR SERPL CREATININE-BSD FRML MDRD: ABNORMAL ML/MIN/{1.73_M2}
GLUCOSE BLD-MCNC: 114 MG/DL (ref 70–99)
GLUCOSE BLD-MCNC: 114 MG/DL (ref 74–100)
GLUCOSE BLD-MCNC: 116 MG/DL (ref 70–99)
GLUCOSE BLD-MCNC: 132 MG/DL (ref 75–110)
HCT VFR BLD CALC: 28.5 % (ref 40.7–50.3)
HCT VFR BLD CALC: 32.5 % (ref 40.7–50.3)
HEMOGLOBIN: 10.3 G/DL (ref 13–17)
HEMOGLOBIN: 8.9 G/DL (ref 13–17)
INR BLD: 2
MAGNESIUM: 1.9 MG/DL (ref 1.6–2.6)
MAGNESIUM: 2.1 MG/DL (ref 1.6–2.6)
MCH RBC QN AUTO: 30.2 PG (ref 25.2–33.5)
MCH RBC QN AUTO: 30.8 PG (ref 25.2–33.5)
MCHC RBC AUTO-ENTMCNC: 31.2 G/DL (ref 28.4–34.8)
MCHC RBC AUTO-ENTMCNC: 31.7 G/DL (ref 28.4–34.8)
MCV RBC AUTO: 96.6 FL (ref 82.6–102.9)
MCV RBC AUTO: 97.3 FL (ref 82.6–102.9)
MODE: ABNORMAL
NEGATIVE BASE EXCESS, ART: 4 (ref 0–2)
NRBC AUTOMATED: 0.2 PER 100 WBC
NRBC AUTOMATED: 0.2 PER 100 WBC
O2 DEVICE/FLOW/%: ABNORMAL
PATIENT TEMP: 38.1
PDW BLD-RTO: 14 % (ref 11.8–14.4)
PDW BLD-RTO: 14.1 % (ref 11.8–14.4)
PLATELET # BLD: ABNORMAL K/UL (ref 138–453)
PLATELET # BLD: ABNORMAL K/UL (ref 138–453)
PLATELET, FLUORESCENCE: 106 K/UL (ref 138–453)
PLATELET, FLUORESCENCE: 95 K/UL (ref 138–453)
PLATELET, IMMATURE FRACTION: 6.6 % (ref 1.1–10.3)
PLATELET, IMMATURE FRACTION: 7.4 % (ref 1.1–10.3)
PMV BLD AUTO: ABNORMAL FL (ref 8.1–13.5)
PMV BLD AUTO: ABNORMAL FL (ref 8.1–13.5)
POC ANGLE TEG W HEP: 61.8 DEG (ref 59–74)
POC ANGLE TEG: 67.9 DEG (ref 59–74)
POC EPL TEG W/HEP: NORMAL % (ref 0–15)
POC EPL TEG: NORMAL % (ref 0–15)
POC HCO3: 20.1 MMOL/L (ref 21–28)
POC KINETICS TEG W HEP: 2 MIN (ref 1–3)
POC KINETICS TEG: 1.6 MIN (ref 1–3)
POC LY30(LYSIS) TEG W HEP: NORMAL % (ref 0–8)
POC LY30(LYSIS) TEG: NORMAL % (ref 0–8)
POC MA(MAX CLOT) TEG: 63.1 MM (ref 55–74)
POC MAX CLOT TEG W HEP: 61.5 MM (ref 55–74)
POC O2 SATURATION: 96 % (ref 94–98)
POC PCO2 TEMP: 35 MM HG
POC PCO2: 33.6 MM HG (ref 35–48)
POC PH TEMP: 7.37
POC PH: 7.38 (ref 7.35–7.45)
POC PO2 TEMP: 86 MM HG
POC PO2: 79.6 MM HG (ref 83–108)
POC REACTION TIME TEG W HEP: 7.2 MIN (ref 4–9)
POC REACTION TIME TEG: 6.8 MIN (ref 4–9)
POSITIVE BASE EXCESS, ART: ABNORMAL (ref 0–3)
POTASSIUM SERPL-SCNC: 3.9 MMOL/L (ref 3.7–5.3)
POTASSIUM SERPL-SCNC: 4.2 MMOL/L (ref 3.7–5.3)
POTASSIUM SERPL-SCNC: 6 MMOL/L (ref 3.7–5.3)
PROTHROMBIN TIME: 19.8 SEC (ref 9–12)
RBC # BLD: 2.95 M/UL (ref 4.21–5.77)
RBC # BLD: 3.34 M/UL (ref 4.21–5.77)
SAMPLE SITE: ABNORMAL
SODIUM BLD-SCNC: 140 MMOL/L (ref 135–144)
SODIUM BLD-SCNC: 141 MMOL/L (ref 135–144)
SURGICAL PATHOLOGY REPORT: NORMAL
TCO2 (CALC), ART: 21 MMOL/L (ref 22–29)
TEG COMMENT: NORMAL
TEG COMMENT: NORMAL
WBC # BLD: 12.3 K/UL (ref 3.5–11.3)
WBC # BLD: 12.5 K/UL (ref 3.5–11.3)

## 2019-04-09 PROCEDURE — 99024 POSTOP FOLLOW-UP VISIT: CPT | Performed by: PHYSICIAN ASSISTANT

## 2019-04-09 PROCEDURE — 85610 PROTHROMBIN TIME: CPT

## 2019-04-09 PROCEDURE — 36415 COLL VENOUS BLD VENIPUNCTURE: CPT

## 2019-04-09 PROCEDURE — 2500000003 HC RX 250 WO HCPCS: Performed by: NURSE PRACTITIONER

## 2019-04-09 PROCEDURE — 94762 N-INVAS EAR/PLS OXIMTRY CONT: CPT

## 2019-04-09 PROCEDURE — 85055 RETICULATED PLATELET ASSAY: CPT

## 2019-04-09 PROCEDURE — 82803 BLOOD GASES ANY COMBINATION: CPT

## 2019-04-09 PROCEDURE — 2500000003 HC RX 250 WO HCPCS: Performed by: THORACIC SURGERY (CARDIOTHORACIC VASCULAR SURGERY)

## 2019-04-09 PROCEDURE — 6360000002 HC RX W HCPCS: Performed by: NURSE PRACTITIONER

## 2019-04-09 PROCEDURE — 6360000002 HC RX W HCPCS: Performed by: INTERNAL MEDICINE

## 2019-04-09 PROCEDURE — 99291 CRITICAL CARE FIRST HOUR: CPT | Performed by: INTERNAL MEDICINE

## 2019-04-09 PROCEDURE — 2580000003 HC RX 258: Performed by: NURSE PRACTITIONER

## 2019-04-09 PROCEDURE — 84132 ASSAY OF SERUM POTASSIUM: CPT

## 2019-04-09 PROCEDURE — 99233 SBSQ HOSP IP/OBS HIGH 50: CPT | Performed by: INTERNAL MEDICINE

## 2019-04-09 PROCEDURE — 2580000003 HC RX 258: Performed by: THORACIC SURGERY (CARDIOTHORACIC VASCULAR SURGERY)

## 2019-04-09 PROCEDURE — 83735 ASSAY OF MAGNESIUM: CPT

## 2019-04-09 PROCEDURE — 94640 AIRWAY INHALATION TREATMENT: CPT

## 2019-04-09 PROCEDURE — 6370000000 HC RX 637 (ALT 250 FOR IP): Performed by: NURSE PRACTITIONER

## 2019-04-09 PROCEDURE — P9041 ALBUMIN (HUMAN),5%, 50ML: HCPCS | Performed by: NURSE PRACTITIONER

## 2019-04-09 PROCEDURE — 2580000003 HC RX 258: Performed by: INTERNAL MEDICINE

## 2019-04-09 PROCEDURE — 80048 BASIC METABOLIC PNL TOTAL CA: CPT

## 2019-04-09 PROCEDURE — 82947 ASSAY GLUCOSE BLOOD QUANT: CPT

## 2019-04-09 PROCEDURE — 2100000001 HC CVICU R&B

## 2019-04-09 PROCEDURE — 71045 X-RAY EXAM CHEST 1 VIEW: CPT

## 2019-04-09 PROCEDURE — 93005 ELECTROCARDIOGRAM TRACING: CPT

## 2019-04-09 PROCEDURE — 6360000002 HC RX W HCPCS: Performed by: THORACIC SURGERY (CARDIOTHORACIC VASCULAR SURGERY)

## 2019-04-09 PROCEDURE — 85027 COMPLETE CBC AUTOMATED: CPT

## 2019-04-09 PROCEDURE — 2700000000 HC OXYGEN THERAPY PER DAY

## 2019-04-09 RX ORDER — FUROSEMIDE 10 MG/ML
20 INJECTION INTRAMUSCULAR; INTRAVENOUS 2 TIMES DAILY
Status: DISCONTINUED | OUTPATIENT
Start: 2019-04-09 | End: 2019-04-11

## 2019-04-09 RX ORDER — ALBUTEROL SULFATE 2.5 MG/3ML
2.5 SOLUTION RESPIRATORY (INHALATION) EVERY 6 HOURS
Status: DISCONTINUED | OUTPATIENT
Start: 2019-04-09 | End: 2019-04-25 | Stop reason: HOSPADM

## 2019-04-09 RX ORDER — DEXTROSE 25 % IN WATER 25 %
12.5 SYRINGE (ML) INTRAVENOUS ONCE
Status: DISCONTINUED | OUTPATIENT
Start: 2019-04-09 | End: 2019-04-25 | Stop reason: HOSPADM

## 2019-04-09 RX ADMIN — OXYCODONE HYDROCHLORIDE AND ACETAMINOPHEN 2 TABLET: 5; 325 TABLET ORAL at 15:53

## 2019-04-09 RX ADMIN — AMIODARONE HYDROCHLORIDE 200 MG: 200 TABLET ORAL at 20:15

## 2019-04-09 RX ADMIN — DEXTROSE MONOHYDRATE 12.5 G: 500 INJECTION PARENTERAL at 01:36

## 2019-04-09 RX ADMIN — CALCIUM CHLORIDE 1 G: 100 INJECTION INTRAVENOUS; INTRAVENTRICULAR at 01:14

## 2019-04-09 RX ADMIN — MUPIROCIN: 20 OINTMENT TOPICAL at 20:58

## 2019-04-09 RX ADMIN — AMIODARONE HYDROCHLORIDE 0.5 MG/MIN: 50 INJECTION, SOLUTION INTRAVENOUS at 10:10

## 2019-04-09 RX ADMIN — ENOXAPARIN SODIUM 40 MG: 40 INJECTION SUBCUTANEOUS at 08:37

## 2019-04-09 RX ADMIN — AMPICILLIN SODIUM AND SULBACTAM SODIUM 3 G: 2; 1 INJECTION, POWDER, FOR SOLUTION INTRAMUSCULAR; INTRAVENOUS at 20:15

## 2019-04-09 RX ADMIN — CALCIUM CHLORIDE 1 G: 100 INJECTION INTRAVENOUS; INTRAVENTRICULAR at 05:31

## 2019-04-09 RX ADMIN — DEXMEDETOMIDINE HYDROCHLORIDE 1.4 MCG/KG/HR: 100 INJECTION, SOLUTION INTRAVENOUS at 20:52

## 2019-04-09 RX ADMIN — CLOPIDOGREL 75 MG: 75 TABLET, FILM COATED ORAL at 08:37

## 2019-04-09 RX ADMIN — ALBUTEROL SULFATE 2.5 MG: 2.5 SOLUTION RESPIRATORY (INHALATION) at 20:14

## 2019-04-09 RX ADMIN — VANCOMYCIN HYDROCHLORIDE 1000 MG: 1 INJECTION, SOLUTION INTRAVENOUS at 20:16

## 2019-04-09 RX ADMIN — PROPOFOL 20 MCG/KG/MIN: 10 INJECTION, EMULSION INTRAVENOUS at 04:03

## 2019-04-09 RX ADMIN — FENTANYL CITRATE 50 MCG: 50 INJECTION INTRAMUSCULAR; INTRAVENOUS at 20:53

## 2019-04-09 RX ADMIN — FENTANYL CITRATE 50 MCG: 50 INJECTION INTRAMUSCULAR; INTRAVENOUS at 18:55

## 2019-04-09 RX ADMIN — Medication 10 ML: at 08:44

## 2019-04-09 RX ADMIN — FAMOTIDINE 20 MG: 20 TABLET, FILM COATED ORAL at 20:15

## 2019-04-09 RX ADMIN — DEXMEDETOMIDINE HYDROCHLORIDE 0.5 MCG/KG/HR: 100 INJECTION, SOLUTION INTRAVENOUS at 17:04

## 2019-04-09 RX ADMIN — DESMOPRESSIN ACETATE 40 MG: 0.2 TABLET ORAL at 20:57

## 2019-04-09 RX ADMIN — METOPROLOL TARTRATE 25 MG: 25 TABLET ORAL at 20:58

## 2019-04-09 RX ADMIN — Medication 10 ML: at 08:37

## 2019-04-09 RX ADMIN — MAGNESIUM SULFATE HEPTAHYDRATE 2 G: 1 INJECTION, SOLUTION INTRAVENOUS at 05:28

## 2019-04-09 RX ADMIN — AMIODARONE HYDROCHLORIDE 200 MG: 200 TABLET ORAL at 08:37

## 2019-04-09 RX ADMIN — FENTANYL CITRATE 50 MCG: 50 INJECTION INTRAMUSCULAR; INTRAVENOUS at 15:59

## 2019-04-09 RX ADMIN — ALBUTEROL SULFATE 2.5 MG: 2.5 SOLUTION RESPIRATORY (INHALATION) at 11:35

## 2019-04-09 RX ADMIN — MUPIROCIN: 20 OINTMENT TOPICAL at 08:36

## 2019-04-09 RX ADMIN — ALBUMIN (HUMAN) 25 G: 12.5 INJECTION, SOLUTION INTRAVENOUS at 00:04

## 2019-04-09 RX ADMIN — AMPICILLIN SODIUM AND SULBACTAM SODIUM 3 G: 2; 1 INJECTION, POWDER, FOR SOLUTION INTRAMUSCULAR; INTRAVENOUS at 15:54

## 2019-04-09 RX ADMIN — FENTANYL CITRATE 50 MCG: 50 INJECTION INTRAMUSCULAR; INTRAVENOUS at 19:54

## 2019-04-09 RX ADMIN — POTASSIUM CHLORIDE 20 MEQ: 29.8 INJECTION, SOLUTION INTRAVENOUS at 05:28

## 2019-04-09 RX ADMIN — OXYCODONE HYDROCHLORIDE AND ACETAMINOPHEN 2 TABLET: 5; 325 TABLET ORAL at 01:36

## 2019-04-09 RX ADMIN — OXYCODONE HYDROCHLORIDE AND ACETAMINOPHEN 2 TABLET: 5; 325 TABLET ORAL at 08:41

## 2019-04-09 RX ADMIN — FUROSEMIDE 20 MG: 10 INJECTION, SOLUTION INTRAMUSCULAR; INTRAVENOUS at 16:01

## 2019-04-09 RX ADMIN — VANCOMYCIN HYDROCHLORIDE 1000 MG: 1 INJECTION, SOLUTION INTRAVENOUS at 08:26

## 2019-04-09 RX ADMIN — INSULIN HUMAN 10 UNITS: 100 INJECTION, SOLUTION PARENTERAL at 01:38

## 2019-04-09 RX ADMIN — Medication 15 ML: at 08:36

## 2019-04-09 RX ADMIN — Medication 15 ML: at 20:58

## 2019-04-09 RX ADMIN — OXYCODONE HYDROCHLORIDE AND ACETAMINOPHEN 2 TABLET: 5; 325 TABLET ORAL at 20:15

## 2019-04-09 RX ADMIN — FAMOTIDINE 20 MG: 20 TABLET, FILM COATED ORAL at 08:36

## 2019-04-09 RX ADMIN — HYDRALAZINE HYDROCHLORIDE 5 MG: 20 INJECTION INTRAMUSCULAR; INTRAVENOUS at 20:36

## 2019-04-09 ASSESSMENT — PULMONARY FUNCTION TESTS
PIF_VALUE: 34
PIF_VALUE: 33
PIF_VALUE: 28
PIF_VALUE: 31
PIF_VALUE: 16
PIF_VALUE: 25
PIF_VALUE: 30

## 2019-04-09 NOTE — PROGRESS NOTES
RN updated Stanton Castillo NP with CT surgery regarding CO &CI. RN discussed VS, I/O's and running medications, see mar.     Stanton Castillo and Dr Mayito Montero with CT surgery discussed with RN plan for pt to receive 1 L albumen and if CO & CI do not increase then starting dobutamine gtt

## 2019-04-09 NOTE — PROGRESS NOTES
[facility] - Progress Note    Today's Date and Time: 4/9/2019, 12:51 PM    Impression :   1. CAD with cardiac arrest x 3. (on the field, in ER and in OR)  2. S/P emergent CABG x 4 on 4-7-19  3. S/p delayed primary closure of chest incision on 4-8-19  4. Improvement in Poor cardiac function with LVEF 10%-->45%  5. Open chest post CABG. Plans for closure on 4-8-19  6. Exposed orthopedic hardware  7. S/P removal of Protruding orthopedic nail from Rt lateral knee area. Old blood and malodor. 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 8. S/P ORIF Rt proximal tibia. Non compliant with follow up. Recommendations:   · Continue vancomycin beyond period for CABG  · Culture of orthopedic nail    Medical Decision Making/Summary/Discussion:   · Patient admitted with chest pain and cardiac arrests x 3  · Emergent CABG x 4  · Found to have protruding Rt tibial orthopedic screw from prior ORIF of tibial fracture  · No signs of infection at present  · Will plan to d/c vancomycin in next 24-48 hr  Infection Control Recommendations   · [place] Precautions  · Contact Isolation     Antimicrobial Stewardship Recommendations     · Targeted therapy  · PK dosing    Coordination of Outpatient Care:   · Estimated Length of IV antimicrobials: TBD  · Patient will need Midline Catheter Insertion:   · Patient will need PICC line Insertion:  · Patient will need: Home IV , [place],  SNF,  LTAC: TBD  · Patient will need outpatient wound care:TBD    Chief complaint/reason for consultation:   · Emergent CABG  · Protruding orthopedic nail from Rt knee      History of Present Illness:   [place]al [place] is a 46y.o.-year-old  male who was initially admitted on 4/7/2019. Patient seen at the request of Negro Pacheco. INITIAL HISTORY:     Patient apparently developed chest pain and called EMS.   Had cardiac arrest at the scene, was resuscitated and brought to ER where he had second cardiac RECONSTRUCTIVE REPAIR STERNAL N/A 4/8/2019    STERNAL  WOUND WASHOUT AND CLOSURE S/P CABG performed by Michelle Iniguez MD at [address] [facility] Right     Myke Climes off roof, external fixation and internal hardware    TONSILLECTOMY         Medications:      dextrose  12.5 g Intravenous Once    albuterol  2.5 mg Nebulization Q6H    vancomycin (VANCOCIN) intermittent dosing (placeholder)   Other RX Placeholder    sodium chloride flush  10 mL Intravenous 2 times per day    sodium chloride flush  10 mL Intravenous 2 times per day    docusate sodium  100 mg Oral BID    polyethylene glycol  17 g Oral Daily    famotidine  20 mg Oral BID    metoprolol tartrate  25 mg Oral BID    lisinopril  2.5 mg Oral Daily    amiodarone  200 mg Oral BID    chlorhexidine  15 mL Mouth/Throat BID    mupirocin   Nasal BID    therapeutic multivitamin-minerals  1 tablet Oral Daily with breakfast    atorvastatin  40 mg Oral Nightly    enoxaparin  40 mg Subcutaneous Daily    aspirin  81 mg Oral Daily    clopidogrel  75 mg Oral Daily    insulin lispro  0-12 Units Subcutaneous TID WC    insulin lispro  0-6 Units Subcutaneous Nightly    vancomycin (VANCOCIN) IV  1,000 mg Intravenous Q12H       Social History:     Social History     Socioeconomic History    Marital status: Unknown     Spouse name: Not on file    Number of children: Not on file    Years of education: Not on file    Highest education level: Not on file   Occupational History    Not on file   Social Needs    Financial resource strain: Not on file    Food insecurity:     Worry: Not on file     Inability: Not on file    Transportation needs:     Medical: Not on file     Non-medical: Not on file   Tobacco Use    Smoking status: Not on file   Substance and Sexual Activity    Alcohol use: Not on file    Drug use: Not on file    Sexual activity: Not on file   Lifestyle    Physical activity:     Days per week: Not on file     Minutes per sounds normal. No organomegaly  All four Extremities: No cyanosis, clubbing, edema, or effusions. Neurologic: Unable to assess. Sedated. Skin: Warm and dry with good turgor. Signs of peripheral arterial insufficiency. Rt lateral knee with open incision with old blood at site of prior protruding screw. Chest incision closed. Medical Decision Making -Laboratory:   I have independently reviewed/ordered the following labs:    CBC with Differential:   Recent Labs     04/07/19  0503  04/08/19 2349 04/09/19  0441   WBC 17.5*   < > 12.3* 12.5*   HGB 15.7   < > 10.3* 8.9*   HCT 51.9*   < > 32.5* 28.5*      < > See Reflexed IPF Result See Reflexed IPF Result   LYMPHOPCT 33  --   --   --    MONOPCT 1  --   --   --     < > = values in this interval not displayed. BMP:   Recent Labs     04/08/19  2349 04/09/19  0239 04/09/19 0441     --  141   K 6.0* 3.9 4.2   *  --  115*   CO2 15*  --  18*   BUN 19  --  20   CREATININE 0.90  --  0.86   MG 2.1  --  1.9     Hepatic Function Panel:   Recent Labs     04/07/19  1947   PROT 5.1*   LABALBU 3.8   BILIDIR 0.18   IBILI 0.53   BILITOT 0.71   ALKPHOS 37*   ALT 63*   *     No results for input(s): RPR in the last 72 hours. No results for input(s): HIV in the last 72 hours. No results for input(s): BC in the last 72 hours.   Lab Results   Component Value Date    RBC 2.95 04/09/2019    WBC 12.5 04/09/2019     Lab Results   Component Value Date    CREATININE 0.86 04/09/2019    GLUCOSE 114 04/09/2019       Medical Decision Making-Imaging:     EXAMINATION:   3 XRAY VIEWS OF THE RIGHT KNEE       4/7/2019 5:00 pm       COMPARISON:   04/07/2019       HISTORY:   ORDERING SYSTEM PROVIDED HISTORY: s/p Hardware removal at bedside   TECHNOLOGIST PROVIDED HISTORY:   s/p Hardware removal at bedside       FINDINGS:   Interval removal of anterior superior fixating screw.  No periprosthetic   fracture.  Chronic deformity of the proximal fibula.  Degenerative changes of knee and tib-fib syndesmosis.         Impression   Removal of anterior-superior fixating screw without interval change otherwise     EXAMINATION:   3 XRAY VIEWS OF THE RIGHT KNEE       4/7/2019 3:28 pm       COMPARISON:   None.       HISTORY:   ORDERING SYSTEM PROVIDED HISTORY: Trauma/Fracture   TECHNOLOGIST PROVIDED HISTORY:   Trauma/Fracture       FINDINGS:   Sideplate and 4 screws transfix the proximal tibia laterally.  Old healed   fracture proximal fibular neck and head.  Spurring lateral tibia plateau. Proximal tibiofibular syndesmosis.  Visualized femur and patella normal.   Soft tissues unremarkable.           Impression   ORIF proximal tibia.  No acute fracture.           Medical Decision Making-Other: Thank you for allowing us to participate in the care of this patient. Please call with questions.     Tiarra Officer, MD  Pager: [phone] - Office: [phone]

## 2019-04-09 NOTE — CARE COORDINATION
Chest closed yesterday, patient remains intubated and sedated. HELP following. Follow for needs    2815 spoke with patient's son Hallie Richardson, asked to see if he can locate any insurance cards or social security number of his father.   He states his grandmother has his wallet and they will work on finding that information

## 2019-04-09 NOTE — PROGRESS NOTES
[facility]   Progress Note                   Date:   4/9/2019  Patient name: Ralph Duncan  Date of admission:  4/7/2019  4:48 AM  MRN:   [id number]  YOB: 1965  PCP: No primary care provider on file. Reason for Admission:  stemi     Subjective:       POD#2( emergent CABG)    Off paralytic.  S/P sternal closure  Follows commands off sedation  Off the pressors last yesterday,ended up requiring dobutamine after lopressor dose last night  U/o is decreasing    Medications:   Scheduled Meds:   dextrose  12.5 g Intravenous Once    vancomycin (VANCOCIN) intermittent dosing (placeholder)   Other RX Placeholder    sodium chloride flush  10 mL Intravenous 2 times per day    sodium chloride flush  10 mL Intravenous 2 times per day    docusate sodium  100 mg Oral BID    polyethylene glycol  17 g Oral Daily    famotidine  20 mg Oral BID    metoprolol tartrate  25 mg Oral BID    lisinopril  2.5 mg Oral Daily    amiodarone  200 mg Oral BID    chlorhexidine  15 mL Mouth/Throat BID    mupirocin   Nasal BID    therapeutic multivitamin-minerals  1 tablet Oral Daily with breakfast    atorvastatin  40 mg Oral Nightly    enoxaparin  40 mg Subcutaneous Daily    aspirin  81 mg Oral Daily    clopidogrel  75 mg Oral Daily    insulin lispro  0-12 Units Subcutaneous TID WC    insulin lispro  0-6 Units Subcutaneous Nightly    vancomycin (VANCOCIN) IV  1,000 mg Intravenous Q12H       Continuous Infusions:   DOBUTamine 3 mcg/kg/min (04/09/19 7799)    sodium chloride 75 mL/hr at 04/07/19 1145    dextrose      phenylephrine (GIANNA-SYNEPHRINE) 50mg/250mL infusion Stopped (04/08/19 1418)    norepinephrine Stopped (04/08/19 0230)    vasopressin (Septic Shock) infusion Stopped (04/08/19 0700)    propofol 20 mcg/kg/min (04/09/19 0403)    insulin (HUMAN R) non-weight based infusion 0.98 Units/hr (04/09/19 0749)    EPINEPHrine infusion Stopped (04/08/19 1600)    amiodarone 0.5 mg/min (04/08/19 hypokinesis of the anterior wall and mid inferior wall with apical akinesis. Assessment:   1 PEA Arrest( Multiple times) ROSC in few minutes S/p weaning off from paralytic  2 Late presentation of Anterior septal STEMI   3 Multivessel CAD s/p emergent CABG( 4/7) ( LIMA-LAD, SVG-PDA,OM 1 & distal circumflex) 4/7 S/P sternal closure on 4/8  4 Ischemic cardiomyopathy with acute systolic CHF( ef 41% ANGELA)  5 NSVT  6 Post op anemia  7 Post op thrombocytopenia  8 Acute pulmonary edema S/p intubation  9 Leukocytosis      Treatment Plan:   1  Urgent CABG( 4/7). Sternal closure ( 4/8). Off the pressors( 4/8) received 25 lopressor last night, CI dropped and ended up requiring dobutamine gtt.weaning it off now. Will recommend low dose lopressor 12.5 BID once weaned off from dobutamine. No ectopy noted on tele monitor. will  Recommend stopping IV amiodarone and switch to PO amiodarone 200 mg BID. Will do lasix 20 mg IV BID. CVP is 18. + 6 liter. U/o is decreasing. Received 1 lit albumin with no response to u/o last night. Will do the ECHO once off the pressors. 2  Post op care per CT Surgery. 3  Continue with Asa, plavix and lipitor. 4. Low dose BB( 12.5 BID) once off the pressors. 5  Keep K>4, Mag>2 and Calcium in normal range. 6. Low grade temp. ID follow up. On Vancomycin for prosthesis. Follow CBC & BMP. Discussed with patient and nursing. Beata Chopra MD  Fellow cardiology    Attending Physician Statement  I have discussed the care of Carlos Vance, including pertinent history and exam findings,  with the cardiology fellow/resident. I have seen and examined the patient and the key elements of all parts of the encounter have been performed by me.   I agree with the assessment, plan and orders as documented by the resident with additional recommendations as below:     Patient is intubated and sedated, follows commands off sedation, BP stable, normal CO/CI and SVO2, PA pressures are high with high cVP, will start IV lasix 20 mg bid with closer monitoring of MAPs. Wean off dobutamine drip.  Change IV amio to po 200 mg bid.      Heminder meet Anne Arango MD   [facility]  [facility], [address]  [phone]

## 2019-04-09 NOTE — PROGRESS NOTES
Physical Therapy  DATE: 2019    NAME: Amira Johnston  MRN: [id number]   : 1965    Patient not seen this date for Physical Therapy due to:  [] Blood transfusion in progress  [] Hemodialysis  []  Patient Declined  [] Spine Precautions   [] Strict Bedrest  [] Surgery/ Procedure  [] Testing      [x] Other- per RN, pt not medically appropriate for therapy this date. PT will check back 4/10/19. [] PT being discontinued at this time. Patient independent. No further needs. [] PT being discontinued at this time as the patient has been transferred to palliative care. No further needs.     Og Leger, PT

## 2019-04-09 NOTE — PROGRESS NOTES
INTENSIVE CARE UNIT  Resident Physician Progress Note    Patient - Radha Manjarrez  Date of Admission -  2019  4:48 AM  Date of Evaluation -  2019  Room and Bed Number -  1012/1012-01   Hospital Day - 2      SUBJECTIVE:     OVERNIGHT EVENTS:      No acute events overnight  Afebrile VS  Weaned off epi yesterday but has been started on dobutamine  Still CO low  Off paralytic  Follows simple commands off sedation    TODAY:      AWAKE & FOLLOWING COMMANDS:  [] No   [x] Yes    SECRETIONS Amount:  [x] Small [] Moderate  [] Large  [] None  Color:     [x] White [] Colored  [] Bloody    SEDATION:  RAAS Score:  [x] Propofol gtt  [] Versed gtt  [] Ativan gtt   [] No Sedation    PARALYZED:  [x] No    [] Yes    VASOPRESSORS:  [] No    [x] Yes  [] Levophed [] Dopamine [] Vasopressin  [x] Dobutamine [] Phenylephrine [] Epinephrine      OBJECTIVE:     VITAL SIGNS:  BP (!) 112/57   Pulse 91   Temp 99 °F (37.2 °C) (Axillary)   Resp 16   Ht 5' 8\" (1.727 m)   Wt 179 lb 3.7 oz (81.3 kg)   SpO2 94%   BMI 27.25 kg/m²   Tmax over 24 hours:  Temp (24hrs), Av.2 °F (37.3 °C), Min:99 °F (37.2 °C), Max:99.3 °F (37.4 °C)      Patient Vitals for the past 8 hrs:   BP Pulse Resp SpO2   19 1300 -- 91 -- 94 %   19 1227 -- -- -- 92 %   19 1200 -- 90 -- 94 %   19 1135 -- -- -- 94 %   19 1114 -- 86 -- 94 %   19 1100 -- 86 -- 94 %   19 1000 -- 89 -- 91 %   19 0900 -- 83 -- 94 %   19 0857 -- 83 -- --   19 0833 (!) 112/57 -- -- --   19 0816 -- 78 16 94 %   19 0800 -- 84 -- 94 %         Intake/Output Summary (Last 24 hours) at 2019 1503  Last data filed at 2019 1428  Gross per 24 hour   Intake 5471 ml   Output 1441 ml   Net 4030 ml     Date 19 0000 - 19 2359   Shift 0296-2582 3491-7768 2593-6949 24 Hour Total   INTAKE   I.V.(mL/kg) 6938(10.3)   7907(04.3)   NG/GT(mL/kg) 200(2.5) 60(0.7)  260(3.2)   Shift Total(mL/kg) 5487(23.6) 60(0.7)  4123(32.0) OUTPUT   Urine(mL/kg/hr) 540(0.8) 346  886   Emesis/NG output(mL/kg)  0(0)  0(0)   Chest Tube 100   100   Shift Total(mL/kg) 640(7.9) 346(4.3)  986(12.1)   Weight (kg) 81.3 81.3 81.3 81.3     Wt Readings from Last 3 Encounters:   04/09/19 179 lb 3.7 oz (81.3 kg)     Body mass index is 27.25 kg/m².         PHYSICAL EXAM:  Constitutional: Patient is intubated  HEENT: PERRLA, EOMI, sclera clear, anicteric, oropharynx clear, no lesions, neck supple with midline trachea   Neck: Supple, symmetrical, trachea midline, no adenopathy, not enlarged and no tenderness, skin normal  Respiratory: coarse breath sounds b/l  Cardiovascular: regular rate and rhythm, normal S1, S2, no murmur noted  Abdomen: soft, nontender, nondistended, no masses or organomegaly  Extremities:  no pedal edema, no clubbing or cyanosis        MEDICATIONS:  Scheduled Meds:   dextrose  12.5 g Intravenous Once    albuterol  2.5 mg Nebulization Q6H    furosemide  20 mg Intravenous BID    ampicillin-sulbactam  3 g Intravenous Q6H    vancomycin (VANCOCIN) intermittent dosing (placeholder)   Other RX Placeholder    sodium chloride flush  10 mL Intravenous 2 times per day    sodium chloride flush  10 mL Intravenous 2 times per day    docusate sodium  100 mg Oral BID    polyethylene glycol  17 g Oral Daily    famotidine  20 mg Oral BID    metoprolol tartrate  25 mg Oral BID    lisinopril  2.5 mg Oral Daily    amiodarone  200 mg Oral BID    chlorhexidine  15 mL Mouth/Throat BID    mupirocin   Nasal BID    therapeutic multivitamin-minerals  1 tablet Oral Daily with breakfast    atorvastatin  40 mg Oral Nightly    enoxaparin  40 mg Subcutaneous Daily    aspirin  81 mg Oral Daily    clopidogrel  75 mg Oral Daily    insulin lispro  0-12 Units Subcutaneous TID     insulin lispro  0-6 Units Subcutaneous Nightly    vancomycin (VANCOCIN) IV  1,000 mg Intravenous Q12H     Continuous Infusions:   DOBUTamine 3.5 mcg/kg/min (04/09/19 1340)    sodium chloride 75 mL/hr at 04/07/19 1145    dextrose      phenylephrine (GIANNA-SYNEPHRINE) 50mg/250mL infusion Stopped (04/08/19 1418)    norepinephrine Stopped (04/08/19 0230)    propofol Stopped (04/09/19 1428)     PRN Meds:     sodium chloride flush 10 mL PRN   magnesium hydroxide 30 mL Daily PRN   sodium chloride flush 10 mL PRN   calcium chloride IVPB 1 g PRN   magnesium sulfate 1 g PRN   potassium chloride 20 mEq PRN   acetaminophen 650 mg Q4H PRN   acetaminophen 650 mg Q4H PRN   oxyCODONE-acetaminophen 1 tablet Q4H PRN   Or     oxyCODONE-acetaminophen 2 tablet Q4H PRN   fentanNYL 25 mcg Q1H PRN   Or     fentanNYL 50 mcg Q1H PRN   diphenhydrAMINE 25 mg Nightly PRN   bisacodyl 10 mg Daily PRN   ondansetron 4 mg Q8H PRN   hydrALAZINE 5 mg Q5 Min PRN   albumin human 25 g PRN   glucose 15 g PRN   dextrose 12.5 g PRN   glucagon (rDNA) 1 mg PRN   dextrose 100 mL/hr PRN   phenylephrine (GIANNA-SYNEPHRINE) 50mg/250mL infusion 10 mcg/min Continuous PRN   norepinephrine 0.03 mcg/kg/min Continuous PRN       SUPPORT DEVICES: [x] Ventilator [] BIPAP  [] Nasal Cannula [] Room Air    VENT SETTINGS (Comprehensive) (if applicable):   PRVC mode, FiO2 40%, PEEP 5, Respiratory Rate 16, Tidal Volume 660  Vent Information  $Ventilation: $Subsequent Day  Ventilator Started: Yes  Skin Assessment: Clean, dry, & intact  Equipment Changed: Humidification  Vent Type: Servo i  Vent Mode: PRVC  Vt Ordered: 660 mL  Rate Set: 16 bmp  Pressure Support: 10 cmH20  FiO2 : (S) 50 %  Sensitivity: 5  PEEP/CPAP: 5  I Time/ I Time %: 0.9 s  Humidification Source: Heated wire  Humidification Temp: 36.9  Humidification Temp Measured: 36.9  Circuit Condensation: Not drained  Nitric Oxide/Epoprostenol In Use?: No  Additional Respiratory  Assessments  Pulse: 91  Resp: 16  SpO2: 94 %  End Tidal CO2: 28 (%)  Position: Semi-Quintana's  Humidification Source: Heated wire  Humidification Temp: 36.9  Circuit Condensation: Not drained  Oral Care Completed?: Component Value Date    CAION 0.97 04/09/2019    CAION 1.01 04/07/2019        Urinalysis: No results found for: NITRU, COLORU, PHUR, LABCAST, WBCUA, RBCUA, MUCUS, TRICHOMONAS, YEAST, BACTERIA, CLARITYU, SPECGRAV, LEUKOCYTESUR, UROBILINOGEN, BILIRUBINUR, BLOODU, GLUCOSEU, KETUA, AMORPHOUS    HgBA1c:    Lab Results   Component Value Date    LABA1C 10.8 04/07/2019     TSH:  No results found for: TSH  Lactic Acid: No results found for: LACTA   Troponin: No results for input(s): TROPONINI in the last 72 hours. Microbiology:  Swab from chest showing few neutrophils no bacteria      Radiology/Imaging:  FINDINGS:   Intact median sternotomy wires.  Midline skin staples.  ET tube in good   position.  Mediastinal drain present.  Enteric tube within the expected   location of the stomach.  Neon-Carly catheter within the right main pulmonary   artery as demonstrated previously.       Stable cardiomegaly.  Asymmetric pulmonary edema right greater than left,   similar to prior.  Small left effusion.  No pneumothorax.  Stable   retrocardiac atelectasis.         Impression   Similar bilateral asymmetric pulmonary edema right greater than left. Impression   Pulmonary vascular congestion and interstitial opacities, stable or slightly   worse on current examination.       Support tubes and lines as above, in grossly unchanged positioning.          ASSESSMENT:     Patient Active Problem List    Diagnosis Date Noted    Cardiac arrest ([facility] [address] 04/07/2019    Severe left ventricular systolic dysfunction 78/65/4221    Acute respiratory failure with hypoxemia (HCC) 04/07/2019    Cardiogenic pulmonary edema ([address] 04/07/2019    PAD (peripheral artery disease) ([address] 04/07/2019    Acute ST elevation myocardial infarction (STEMI) of anterior wall (HCC)     Subacute osteomyelitis of right tibia ([address]     Exposed orthopaedic hardware ([address]           PLAN:     WEAN PER PROTOCOL:  [x] No   [] Yes  [] N/A    ICU PROPHYLAXIS:  Stress ulcer:  [x] PPI Agent  [] L2Uumrn [] Sucralfate  [] Other:  VTE:   [x] Enoxaparin  [] Unfract.  Heparin Subcut  [] EPC Cuffs    NUTRITION:  [x] NPO [] Tube Feeding (Specify: ) [] TPN  [] PO    HOME MEDS RECONCILED: [x] No  [] Yes    CONSULTATION NEEDED:  [] No  [x] Yes    FAMILY UPDATED:    [x] No  [] Yes    TRANSFER OUT OF ICU:   [x] No  [] Yes        Additional Assessment:  STEMI  PEA cardiac arrest  Acute respiratory failure  Acute cardiogenic pulmonary edema  Cardiogenic shock  Multivessel coronary artery disease  Status post left ventricular catheterization  Status post CABG  Diabetes mellitus  Acute blood loss anemia      Plan:  Continue mechanical ventilation  Will recommend changing sedation from propofol to versed in lieu of decreased cardiac output  Aspiration precautions  Continue pressure support  Management of STEMI by cardiology and CTS  Monitor intake and output      Electronically signed by Liset Landis MD on 4/9/2019 at 3:03 PM    4/9/2019 3:03 PM

## 2019-04-09 NOTE — PROGRESS NOTES
Occupational Therapy Not Seen Note    DATE: 2019  Name: Rosangela Beck  : 1965  MRN: [id number]    Patient not available for Occupational Therapy due to:     Other: Cx per MARIA E Dove pt not medically appropriate    Next Scheduled Treatment: Re-check 4/10/2019    Electronically signed by YANCY Marcos on 2019 at 8:54 AM

## 2019-04-09 NOTE — PROGRESS NOTES
Progress Note    Patient:  Mychal Beach  YOB: 1965     46 y.o. male    Subjective:  Patient seen and examined at bedside this morning. Intubated and sedated. Stable overnight per nursing on one presser. Went for closure of chest wound yesterday. Objective:   Vitals:    04/09/19 0415   BP:    Pulse: 86   Resp:    Temp:    SpO2: 96%     Gen: Intubated and sedated   MSK: Dressing in place to RLE, C/D/I. Toes warm and well perfused this morning. Unable to assess neuro motorsensory status due to clinical exam     Recent Labs     04/08/19  0445  04/08/19  2349 04/09/19  0239   WBC 14.2*  --  12.3*  --    HGB 10.1*   < > 10.3*  --    HCT 31.4*   < > 32.5*  --    PLT See Reflexed IPF Result  --  See Reflexed IPF Result  --    INR 1.3  --   --   --    *  --  140  --    K 4.0  --  6.0* 3.9   BUN 14  --  19  --    CREATININE 0.63*  --  0.90  --    GLUCOSE 97  --  116*  --     < > = values in this interval not displayed. Meds: Vancomycin, ASA, Lovenox, Plavix  See rec for complete list    Assessment/Plan : 46 y.o. male being seen for R knee infected hardware and likely proximal tibia osteomyelitis. S/p bedside screw removal, 4/7    -When medically optimized will need hardware removal from R proximal tibia. This can be done as an outpatient.  -Continue IV antibiotics per infectious disease  -f/u Cx(NGTD)  -Weight Bearing: as tolerated RLE  -Pain control and medical management per primary team  -Ice (20 min, 1 hour off) and elevation for edema/pain control  -DVT ppx: Ok with chemical AC form orthopedic perspective management per primary  -PT/OT to evaluate and treat when able to participate   - Patient will need HWR in the future. This can be done as an out patient.  Please page ortho when patient is medically stabilized for re-evaluation and to consider surgical intervention.    -Please page ortho with any questions      --------------------------------------------------------------  Yaritza Campbell DO, PGY-2  [facility]) Orthopedic Surgery   4:56 AM 04/09/19

## 2019-04-10 ENCOUNTER — APPOINTMENT (OUTPATIENT)
Dept: GENERAL RADIOLOGY | Age: 54
DRG: 165 | End: 2019-04-10
Payer: MEDICAID

## 2019-04-10 LAB
ALLEN TEST: ABNORMAL
ANION GAP SERPL CALCULATED.3IONS-SCNC: 9 MMOL/L (ref 9–17)
BUN BLDV-MCNC: 22 MG/DL (ref 6–20)
BUN/CREAT BLD: ABNORMAL (ref 9–20)
CALCIUM SERPL-MCNC: 7.5 MG/DL (ref 8.6–10.4)
CHLORIDE BLD-SCNC: 112 MMOL/L (ref 98–107)
CO2: 18 MMOL/L (ref 20–31)
CREAT SERPL-MCNC: 0.41 MG/DL (ref 0.7–1.2)
EKG ATRIAL RATE: 122 BPM
EKG ATRIAL RATE: 149 BPM
EKG P AXIS: 42 DEGREES
EKG P AXIS: 59 DEGREES
EKG P-R INTERVAL: 132 MS
EKG P-R INTERVAL: 144 MS
EKG Q-T INTERVAL: 280 MS
EKG Q-T INTERVAL: 330 MS
EKG QRS DURATION: 90 MS
EKG QRS DURATION: 98 MS
EKG QTC CALCULATION (BAZETT): 441 MS
EKG QTC CALCULATION (BAZETT): 470 MS
EKG R AXIS: 60 DEGREES
EKG R AXIS: 68 DEGREES
EKG T AXIS: 134 DEGREES
EKG T AXIS: 138 DEGREES
EKG VENTRICULAR RATE: 122 BPM
EKG VENTRICULAR RATE: 149 BPM
FIO2: 40
GFR AFRICAN AMERICAN: >60 ML/MIN
GFR NON-AFRICAN AMERICAN: >60 ML/MIN
GFR SERPL CREATININE-BSD FRML MDRD: ABNORMAL ML/MIN/{1.73_M2}
GFR SERPL CREATININE-BSD FRML MDRD: ABNORMAL ML/MIN/{1.73_M2}
GLUCOSE BLD-MCNC: 104 MG/DL (ref 75–110)
GLUCOSE BLD-MCNC: 109 MG/DL (ref 75–110)
GLUCOSE BLD-MCNC: 111 MG/DL (ref 75–110)
GLUCOSE BLD-MCNC: 114 MG/DL (ref 75–110)
GLUCOSE BLD-MCNC: 117 MG/DL (ref 75–110)
GLUCOSE BLD-MCNC: 121 MG/DL (ref 75–110)
GLUCOSE BLD-MCNC: 122 MG/DL (ref 75–110)
GLUCOSE BLD-MCNC: 127 MG/DL (ref 75–110)
GLUCOSE BLD-MCNC: 129 MG/DL (ref 75–110)
GLUCOSE BLD-MCNC: 144 MG/DL (ref 75–110)
GLUCOSE BLD-MCNC: 159 MG/DL (ref 75–110)
GLUCOSE BLD-MCNC: 164 MG/DL (ref 75–110)
GLUCOSE BLD-MCNC: 202 MG/DL (ref 75–110)
GLUCOSE BLD-MCNC: 212 MG/DL (ref 70–99)
HCT VFR BLD CALC: 32 % (ref 40.7–50.3)
HEMOGLOBIN: 10.1 G/DL (ref 13–17)
INR BLD: 1.7
MAGNESIUM: 2 MG/DL (ref 1.6–2.6)
MCH RBC QN AUTO: 30.2 PG (ref 25.2–33.5)
MCHC RBC AUTO-ENTMCNC: 31.6 G/DL (ref 28.4–34.8)
MCV RBC AUTO: 95.8 FL (ref 82.6–102.9)
MODE: ABNORMAL
NEGATIVE BASE EXCESS, ART: 6 (ref 0–2)
NRBC AUTOMATED: 0.4 PER 100 WBC
O2 DEVICE/FLOW/%: ABNORMAL
PATIENT TEMP: ABNORMAL
PDW BLD-RTO: 13.7 % (ref 11.8–14.4)
PLATELET # BLD: ABNORMAL K/UL (ref 138–453)
PLATELET, FLUORESCENCE: 95 K/UL (ref 138–453)
PLATELET, IMMATURE FRACTION: 8.1 % (ref 1.1–10.3)
PMV BLD AUTO: ABNORMAL FL (ref 8.1–13.5)
POC HCO3: 18.5 MMOL/L (ref 21–28)
POC O2 SATURATION: 89 % (ref 94–98)
POC PCO2 TEMP: ABNORMAL MM HG
POC PCO2: 30.3 MM HG (ref 35–48)
POC PH TEMP: ABNORMAL
POC PH: 7.39 (ref 7.35–7.45)
POC PO2 TEMP: ABNORMAL MM HG
POC PO2: 55.3 MM HG (ref 83–108)
POSITIVE BASE EXCESS, ART: ABNORMAL (ref 0–3)
POTASSIUM SERPL-SCNC: 4.4 MMOL/L (ref 3.7–5.3)
PROTHROMBIN TIME: 17.4 SEC (ref 9–12)
RBC # BLD: 3.34 M/UL (ref 4.21–5.77)
SAMPLE SITE: ABNORMAL
SODIUM BLD-SCNC: 139 MMOL/L (ref 135–144)
TCO2 (CALC), ART: 20 MMOL/L (ref 22–29)
WBC # BLD: 14.9 K/UL (ref 3.5–11.3)

## 2019-04-10 PROCEDURE — 85027 COMPLETE CBC AUTOMATED: CPT

## 2019-04-10 PROCEDURE — 82803 BLOOD GASES ANY COMBINATION: CPT

## 2019-04-10 PROCEDURE — 6360000002 HC RX W HCPCS: Performed by: NURSE PRACTITIONER

## 2019-04-10 PROCEDURE — 94640 AIRWAY INHALATION TREATMENT: CPT

## 2019-04-10 PROCEDURE — 99233 SBSQ HOSP IP/OBS HIGH 50: CPT | Performed by: INTERNAL MEDICINE

## 2019-04-10 PROCEDURE — 6360000002 HC RX W HCPCS: Performed by: INTERNAL MEDICINE

## 2019-04-10 PROCEDURE — 99024 POSTOP FOLLOW-UP VISIT: CPT | Performed by: PHYSICIAN ASSISTANT

## 2019-04-10 PROCEDURE — 2500000003 HC RX 250 WO HCPCS: Performed by: THORACIC SURGERY (CARDIOTHORACIC VASCULAR SURGERY)

## 2019-04-10 PROCEDURE — 83735 ASSAY OF MAGNESIUM: CPT

## 2019-04-10 PROCEDURE — 6370000000 HC RX 637 (ALT 250 FOR IP): Performed by: NURSE PRACTITIONER

## 2019-04-10 PROCEDURE — 71045 X-RAY EXAM CHEST 1 VIEW: CPT

## 2019-04-10 PROCEDURE — 94762 N-INVAS EAR/PLS OXIMTRY CONT: CPT

## 2019-04-10 PROCEDURE — 6360000002 HC RX W HCPCS: Performed by: THORACIC SURGERY (CARDIOTHORACIC VASCULAR SURGERY)

## 2019-04-10 PROCEDURE — 2100000001 HC CVICU R&B

## 2019-04-10 PROCEDURE — 99291 CRITICAL CARE FIRST HOUR: CPT | Performed by: INTERNAL MEDICINE

## 2019-04-10 PROCEDURE — 85610 PROTHROMBIN TIME: CPT

## 2019-04-10 PROCEDURE — 2580000003 HC RX 258: Performed by: THORACIC SURGERY (CARDIOTHORACIC VASCULAR SURGERY)

## 2019-04-10 PROCEDURE — 80048 BASIC METABOLIC PNL TOTAL CA: CPT

## 2019-04-10 PROCEDURE — 85055 RETICULATED PLATELET ASSAY: CPT

## 2019-04-10 PROCEDURE — 94770 HC ETCO2 MONITOR DAILY: CPT

## 2019-04-10 PROCEDURE — 2580000003 HC RX 258: Performed by: INTERNAL MEDICINE

## 2019-04-10 PROCEDURE — 2700000000 HC OXYGEN THERAPY PER DAY

## 2019-04-10 PROCEDURE — 94003 VENT MGMT INPAT SUBQ DAY: CPT

## 2019-04-10 RX ORDER — ALBUTEROL SULFATE 2.5 MG/3ML
2.5 SOLUTION RESPIRATORY (INHALATION) EVERY 6 HOURS PRN
Status: DISCONTINUED | OUTPATIENT
Start: 2019-04-10 | End: 2019-04-25 | Stop reason: HOSPADM

## 2019-04-10 RX ADMIN — OXYCODONE HYDROCHLORIDE AND ACETAMINOPHEN 2 TABLET: 5; 325 TABLET ORAL at 21:00

## 2019-04-10 RX ADMIN — MUPIROCIN: 20 OINTMENT TOPICAL at 10:30

## 2019-04-10 RX ADMIN — DEXMEDETOMIDINE HYDROCHLORIDE 1.4 MCG/KG/HR: 100 INJECTION, SOLUTION INTRAVENOUS at 00:25

## 2019-04-10 RX ADMIN — OXYCODONE HYDROCHLORIDE AND ACETAMINOPHEN 2 TABLET: 5; 325 TABLET ORAL at 11:04

## 2019-04-10 RX ADMIN — OXYCODONE HYDROCHLORIDE AND ACETAMINOPHEN 2 TABLET: 5; 325 TABLET ORAL at 16:56

## 2019-04-10 RX ADMIN — AMPICILLIN SODIUM AND SULBACTAM SODIUM 3 G: 2; 1 INJECTION, POWDER, FOR SOLUTION INTRAMUSCULAR; INTRAVENOUS at 14:30

## 2019-04-10 RX ADMIN — AMIODARONE HYDROCHLORIDE 200 MG: 200 TABLET ORAL at 10:30

## 2019-04-10 RX ADMIN — CLOPIDOGREL 75 MG: 75 TABLET, FILM COATED ORAL at 10:30

## 2019-04-10 RX ADMIN — ALBUTEROL SULFATE 2.5 MG: 2.5 SOLUTION RESPIRATORY (INHALATION) at 03:20

## 2019-04-10 RX ADMIN — AMPICILLIN SODIUM AND SULBACTAM SODIUM 3 G: 2; 1 INJECTION, POWDER, FOR SOLUTION INTRAMUSCULAR; INTRAVENOUS at 20:58

## 2019-04-10 RX ADMIN — METOPROLOL TARTRATE 25 MG: 25 TABLET ORAL at 20:57

## 2019-04-10 RX ADMIN — VANCOMYCIN HYDROCHLORIDE 1000 MG: 1 INJECTION, SOLUTION INTRAVENOUS at 19:47

## 2019-04-10 RX ADMIN — ALBUTEROL SULFATE 2.5 MG: 2.5 SOLUTION RESPIRATORY (INHALATION) at 23:15

## 2019-04-10 RX ADMIN — FENTANYL CITRATE 50 MCG: 50 INJECTION INTRAMUSCULAR; INTRAVENOUS at 01:50

## 2019-04-10 RX ADMIN — DEXMEDETOMIDINE HYDROCHLORIDE 1.4 MCG/KG/HR: 100 INJECTION, SOLUTION INTRAVENOUS at 02:18

## 2019-04-10 RX ADMIN — FENTANYL CITRATE 50 MCG: 50 INJECTION INTRAMUSCULAR; INTRAVENOUS at 03:42

## 2019-04-10 RX ADMIN — FENTANYL CITRATE 50 MCG: 50 INJECTION INTRAMUSCULAR; INTRAVENOUS at 00:25

## 2019-04-10 RX ADMIN — FUROSEMIDE 20 MG: 10 INJECTION, SOLUTION INTRAMUSCULAR; INTRAVENOUS at 10:30

## 2019-04-10 RX ADMIN — FENTANYL CITRATE 50 MCG: 50 INJECTION INTRAMUSCULAR; INTRAVENOUS at 08:43

## 2019-04-10 RX ADMIN — DEXMEDETOMIDINE HYDROCHLORIDE 1.4 MCG/KG/HR: 100 INJECTION, SOLUTION INTRAVENOUS at 17:14

## 2019-04-10 RX ADMIN — Medication 15 ML: at 10:38

## 2019-04-10 RX ADMIN — OXYCODONE HYDROCHLORIDE AND ACETAMINOPHEN 2 TABLET: 5; 325 TABLET ORAL at 05:50

## 2019-04-10 RX ADMIN — DEXMEDETOMIDINE HYDROCHLORIDE 1.4 MCG/KG/HR: 100 INJECTION, SOLUTION INTRAVENOUS at 13:30

## 2019-04-10 RX ADMIN — ALBUTEROL SULFATE 2.5 MG: 2.5 SOLUTION RESPIRATORY (INHALATION) at 13:42

## 2019-04-10 RX ADMIN — AMPICILLIN SODIUM AND SULBACTAM SODIUM 3 G: 2; 1 INJECTION, POWDER, FOR SOLUTION INTRAMUSCULAR; INTRAVENOUS at 02:00

## 2019-04-10 RX ADMIN — METOPROLOL TARTRATE 25 MG: 25 TABLET ORAL at 10:29

## 2019-04-10 RX ADMIN — FENTANYL CITRATE 50 MCG: 50 INJECTION INTRAMUSCULAR; INTRAVENOUS at 06:09

## 2019-04-10 RX ADMIN — FAMOTIDINE 20 MG: 20 TABLET, FILM COATED ORAL at 20:58

## 2019-04-10 RX ADMIN — DEXMEDETOMIDINE HYDROCHLORIDE 1.4 MCG/KG/HR: 100 INJECTION, SOLUTION INTRAVENOUS at 21:12

## 2019-04-10 RX ADMIN — VANCOMYCIN HYDROCHLORIDE 1000 MG: 1 INJECTION, SOLUTION INTRAVENOUS at 08:44

## 2019-04-10 RX ADMIN — DESMOPRESSIN ACETATE 40 MG: 0.2 TABLET ORAL at 20:58

## 2019-04-10 RX ADMIN — Medication 15 ML: at 21:00

## 2019-04-10 RX ADMIN — ALBUTEROL SULFATE 2.5 MG: 2.5 SOLUTION RESPIRATORY (INHALATION) at 20:06

## 2019-04-10 RX ADMIN — INSULIN LISPRO 1 UNITS: 100 INJECTION, SOLUTION INTRAVENOUS; SUBCUTANEOUS at 22:00

## 2019-04-10 RX ADMIN — DEXMEDETOMIDINE HYDROCHLORIDE 1.4 MCG/KG/HR: 100 INJECTION, SOLUTION INTRAVENOUS at 05:02

## 2019-04-10 RX ADMIN — ENOXAPARIN SODIUM 40 MG: 40 INJECTION SUBCUTANEOUS at 10:30

## 2019-04-10 RX ADMIN — MUPIROCIN: 20 OINTMENT TOPICAL at 20:58

## 2019-04-10 RX ADMIN — OXYCODONE HYDROCHLORIDE AND ACETAMINOPHEN 2 TABLET: 5; 325 TABLET ORAL at 02:04

## 2019-04-10 RX ADMIN — ALBUTEROL SULFATE 2.5 MG: 2.5 SOLUTION RESPIRATORY (INHALATION) at 07:28

## 2019-04-10 RX ADMIN — Medication 3 MG/HR: at 09:15

## 2019-04-10 RX ADMIN — FAMOTIDINE 20 MG: 20 TABLET, FILM COATED ORAL at 10:29

## 2019-04-10 RX ADMIN — AMPICILLIN SODIUM AND SULBACTAM SODIUM 3 G: 2; 1 INJECTION, POWDER, FOR SOLUTION INTRAMUSCULAR; INTRAVENOUS at 08:33

## 2019-04-10 RX ADMIN — FUROSEMIDE 20 MG: 10 INJECTION, SOLUTION INTRAMUSCULAR; INTRAVENOUS at 18:19

## 2019-04-10 RX ADMIN — AMIODARONE HYDROCHLORIDE 200 MG: 200 TABLET ORAL at 20:58

## 2019-04-10 ASSESSMENT — PULMONARY FUNCTION TESTS
PIF_VALUE: 31
PIF_VALUE: 36
PIF_VALUE: 33
PIF_VALUE: 38
PIF_VALUE: 37
PIF_VALUE: 30
PIF_VALUE: 36
PIF_VALUE: 31
PIF_VALUE: 33
PIF_VALUE: 27
PIF_VALUE: 33
PIF_VALUE: 36
PIF_VALUE: 32
PIF_VALUE: 33
PIF_VALUE: 33
PIF_VALUE: 34
PIF_VALUE: 33
PIF_VALUE: 32
PIF_VALUE: 32
PIF_VALUE: 33
PIF_VALUE: 33

## 2019-04-10 ASSESSMENT — PAIN SCALES - GENERAL: PAINLEVEL_OUTOF10: 10

## 2019-04-10 NOTE — PROGRESS NOTES
arrest. Was resuscitated once again and taken to OR for emergent CABG x 4. He had another cardiac arrest in OR. Has survived the above and is back in the ICU. Found to have area of malodor on lateral aspect Rt knee, corresponding to a protruding orthopedic screw from a remote ORIF of the Rt  Tibial plateau. The screw has been removed and sent for culture. X rays show healed fracture of the proximal tibia. There is additional hardware in place that will likely need to be removed if patient survives. CURRENT EVALUATION : 4/10/2019    Fever this morning of 101.4 (38.6)  VS stable . Off dobutamine. Remains intubated. Sedation changed to versed from propofol due to cardiac output. Precedex running too. Per nursing, he was very agitated last night, attempting to pull out tubes and thrashing. Plan is to keep him sedated for at least 24 more hours due to the agitation. Had problems with NSVT during the night on 4-8-19. Suppressed with amiodarone     WBCs trended up to 14.9 from 12.5. CXR today showed congestion and effusions. Sternum closed on 4/8    Currently on Vancomycin. Looks better  Leg sinus tract clean  No culture data    Multiple lines. No inflammation    Cultures:  Wound:  · Orthopedic screw:No culture data    Discussed with RN. I have personally reviewed the past medical history, past surgical history, medications, social history, and family history, and I have updated the database accordingly.   Past Medical History:     Past Medical History:   Diagnosis Date    CAD (coronary artery disease) 04/07/2019    Cardiac arrest ([facility] [address] 04/07/2019    Osteolysis 04/07/2019    R Knee hardware    PEA (Pulseless electrical activity) ([facility] [address] 04/07/2019    PVD (peripheral vascular disease) ([facility] [address] 04/07/2019       Past Surgical  History:     Past Surgical History:   Procedure Laterality Date    CARDIAC CATHETERIZATION  04/07/2019    MVD-Stv    CORONARY ARTERY BYPASS GRAFT  04/07/2019 x4 per Dr. Charlie Martinez, [facility] GRAFT N/A 4/7/2019    EMERGENT CPR,  CORONARY ARTERY BYPASS X4, ON PUMP ,CHEST LEFT OPEN; SWAN, ANGELA PER ANESTHESIA performed by Michelle Iniguez MD at [address] [facility]      unsure if correct known tibial surgery    HARDWARE REMOVAL Right 04/07/2019    R Knee internal screw protruding through skin    RECONSTRUCTIVE REPAIR STERNAL N/A 4/8/2019    STERNAL  WOUND WASHOUT AND CLOSURE S/P CABG performed by Michelle Iniguez MD at [address] Right     Myke Climes off roof, external fixation and internal hardware    TONSILLECTOMY         Medications:      dextrose  12.5 g Intravenous Once    albuterol  2.5 mg Nebulization Q6H    furosemide  20 mg Intravenous BID    ampicillin-sulbactam  3 g Intravenous Q6H    vancomycin (VANCOCIN) intermittent dosing (placeholder)   Other RX Placeholder    sodium chloride flush  10 mL Intravenous 2 times per day    sodium chloride flush  10 mL Intravenous 2 times per day    docusate sodium  100 mg Oral BID    polyethylene glycol  17 g Oral Daily    famotidine  20 mg Oral BID    metoprolol tartrate  25 mg Oral BID    lisinopril  2.5 mg Oral Daily    amiodarone  200 mg Oral BID    chlorhexidine  15 mL Mouth/Throat BID    mupirocin   Nasal BID    therapeutic multivitamin-minerals  1 tablet Oral Daily with breakfast    atorvastatin  40 mg Oral Nightly    enoxaparin  40 mg Subcutaneous Daily    aspirin  81 mg Oral Daily    clopidogrel  75 mg Oral Daily    insulin lispro  0-12 Units Subcutaneous TID WC    insulin lispro  0-6 Units Subcutaneous Nightly    vancomycin (VANCOCIN) IV  1,000 mg Intravenous Q12H       Social History:     Social History     Socioeconomic History    Marital status: Unknown     Spouse name: Not on file    Number of children: Not on file    Years of education: Not on file    Highest education level: Not on file   Occupational History    Not on file   Social Needs    Financial resource strain: Not on file    Food insecurity:     Worry: Not on file     Inability: Not on file    Transportation needs:     Medical: Not on file     Non-medical: Not on file   Tobacco Use    Smoking status: Not on file   Substance and Sexual Activity    Alcohol use: Not on file    Drug use: Not on file    Sexual activity: Not on file   Lifestyle    Physical activity:     Days per week: Not on file     Minutes per session: Not on file    Stress: Not on file   Relationships    Social connections:     Talks on phone: Not on file     Gets together: Not on file     Attends Taoism service: Not on file     Active member of club or organization: Not on file     Attends meetings of clubs or organizations: Not on file     Relationship status: Not on file    Intimate partner violence:     Fear of current or ex partner: Not on file     Emotionally abused: Not on file     Physically abused: Not on file     Forced sexual activity: Not on file   Other Topics Concern    Not on file   Social History Narrative    Not on file       Family History:   No family history on file. Allergies:   Patient has no allergy information on record. Review of Systems:   Unable to provide. Sedated on ventilator.     Physical Examination :     Patient Vitals for the past 8 hrs:   BP Temp Temp src Pulse Resp SpO2 Weight   04/10/19 1300 -- -- -- 84 -- 98 % --   04/10/19 1230 -- -- -- 84 -- 98 % --   04/10/19 1200 -- -- -- 83 -- 98 % --   04/10/19 1110 130/62 101.4 °F (38.6 °C) Oral 84 18 99 % --   04/10/19 1106 -- -- -- 84 -- 98 % --   04/10/19 1100 -- -- -- 84 -- 98 % --   04/10/19 1000 -- -- -- 80 -- 100 % --   04/10/19 0900 -- -- -- 84 -- 100 % --   04/10/19 0800 -- -- -- 82 -- 99 % --   04/10/19 0745 -- -- -- 81 -- -- --   04/10/19 0741 131/60 98.2 °F (36.8 °C) Oral 80 17 99 % --   04/10/19 0728 -- -- -- 79 -- 99 % --   04/10/19 0645 -- -- -- 78 -- -- --   04/10/19 0630 -- -- -- 77 --

## 2019-04-10 NOTE — PROGRESS NOTES
Occupational Therapy Not Seen Note    DATE: 4/10/2019  Name: Celine Yuen  : 1965  MRN: [id number]    Patient not available for Occupational Therapy due to:     Other: Cx per RN James Duffy, pt not medically appropriate, intubated/ sedated, agitated when moved, to remain flat in bed     Next Scheduled Treatment: Re-check 2019    Electronically signed by YANCY Flowers on 4/10/2019 at 8:31 AM

## 2019-04-10 NOTE — PROGRESS NOTES
[id number] [facility]  Progress Note    4/10/2019 8:30 AM  Surgeon:  Dr. Rae Molina     POD# 3   S/P :  Emerg Coronary artery bypass 4/7/19  Chest closure 4/8/19  EF: 45 %    Subjective:  Mr. Rosie Jauregui seen and examined today. Sedated, intubated    Vital Signs: /60   Pulse 81   Temp 98.2 °F (36.8 °C) (Oral)   Resp 17   Ht 5' 8\" (1.727 m)   Wt 178 lb 9.6 oz (81 kg)   SpO2 99%   BMI 27.16 kg/m²      Admit Weight: Weight: 176 lb 5.9 oz (80 kg)   WEIGHTWeight: 178 lb 9.6 oz (81 kg)     Rhythm: NSR    Labs:   CBC:   Recent Labs     04/08/19  2349 04/09/19  0441 04/10/19  0440   WBC 12.3* 12.5* 14.9*   HGB 10.3* 8.9* 10.1*   HCT 32.5* 28.5* 32.0*   MCV 97.3 96.6 95.8   PLT See Reflexed IPF Result See Reflexed IPF Result See Reflexed IPF Result     BMP:   Recent Labs     04/08/19  2349 04/09/19  0239 04/09/19  0441 04/10/19  0440     --  141 139   K 6.0* 3.9 4.2 4.4   *  --  115* 112*   CO2 15*  --  18* 18*   BUN 19  --  20 22*   CREATININE 0.90  --  0.86 0.41*     PT/INR:   Recent Labs     04/08/19 0445 04/09/19  0441 04/10/19  0440   PROTIME 13.3* 19.8* 17.4*   INR 1.3 2.0 1.7     APTT:   Recent Labs     04/07/19  1205   APTT 25.7       Chest X-Ray:  ere is redemonstration of bilateral pulmonary congestion/edema that is  similar compared to prior.  There are bibasilar effusions.  There is no  pneumothorax.  The heart is prominent.  The upper abdomen is unremarkable. The extrathoracic soft tissues are unremarkable. Prasad Coffer is an endotracheal  tube with the tip in the midtrachea.  There is a gastric tube with the tip in  the proximal aspect of the stomach.  The right internal jugular line has been  removed.  The [place]-Carly catheter has been removed    I/O:  I/O last 3 completed shifts:   In: 1836 [I.V.:3132; NG/GT:340]  Out: 2466 [Urine:2166; Emesis/NG output:300]  Air Leak:  No air leak     Scheduled Meds:    dextrose  12.5 g Intravenous Once    albuterol  2.5 mg Nebulization Q6H    furosemide  20 mg Intravenous BID    ampicillin-sulbactam  3 g Intravenous Q6H    vancomycin (VANCOCIN) intermittent dosing (placeholder)   Other RX Placeholder    sodium chloride flush  10 mL Intravenous 2 times per day    sodium chloride flush  10 mL Intravenous 2 times per day    docusate sodium  100 mg Oral BID    polyethylene glycol  17 g Oral Daily    famotidine  20 mg Oral BID    metoprolol tartrate  25 mg Oral BID    lisinopril  2.5 mg Oral Daily    amiodarone  200 mg Oral BID    chlorhexidine  15 mL Mouth/Throat BID    mupirocin   Nasal BID    therapeutic multivitamin-minerals  1 tablet Oral Daily with breakfast    atorvastatin  40 mg Oral Nightly    enoxaparin  40 mg Subcutaneous Daily    aspirin  81 mg Oral Daily    clopidogrel  75 mg Oral Daily    insulin lispro  0-12 Units Subcutaneous TID WC    insulin lispro  0-6 Units Subcutaneous Nightly    vancomycin (VANCOCIN) IV  1,000 mg Intravenous Q12H     Continuous Infusions:    dexmedetomidine (PRECEDEX) IV infusion 1.4 mcg/kg/hr (04/10/19 0502)    DOBUTamine 2.5 mcg/kg/min (04/09/19 1906)    sodium chloride 75 mL/hr at 04/07/19 1145    dextrose      phenylephrine (GIANNA-SYNEPHRINE) 50mg/250mL infusion Stopped (04/08/19 1418)    norepinephrine Stopped (04/08/19 0230)       Exam:   General: alert and oriented to person, place and time, well-developed and well-nourished, in no acute distress  Heart:Normal S1 and S2.  Regular rhythm. No murmurs, gallops, or rubs. , Pacing wires  Yes  Lungs: diminished breath sounds bilaterally Equal and symmetric expansion with respiration. Chest tubes to suction  Abdomen: soft, non tender, non distended, BSx4  Extremities: 1+ edema, intact pulses in all four extremities  Musculoskeletal:  Intact range of motion of peripheral joints. paralyzed  Neurologic:  Non-focal sensory deficits on exam.  Psychiatric: Mood and affect are appropriate.   Wounds: clean and dry, healing appropriately, chest open      Assessment/Plan:   Patient Active Problem List   Diagnosis    Card[facility] arrest [facility] [address]    Acute ST elevation myocardial infarction (STEMI) of anterior wall (HCC)    Severe left ventricular systolic dysfunction    Acute respiratory failure with hypoxemia (HCC)    Cardiogenic pulmonary edema (HCC)    PAD (peripheral artery disease) ([facility] [address] [facility]    Subacute osteomyelitis of right tibia ([facility] [address]    Exposed orthopaedic hardware ([facility] [address]       1. S/p ECABG   POD 3  2. HD - stable, Vtach, Amio gtt, dobutamine  3. Pulmo - sedated, intubated, keep intubated   Lasix 20 bid  4. GI - +BS, insulin gtt  Start TF, dietician consult  5.  - good UOP  6. Neuro - sedated  7. ABLA - stable  PAD - Vascular on board  S/p removal of prosthetic screw from rt proximal tibia - waiting for culture  ID on board - Vanco    DVT ppx: Lovenox & SCD's while stationary  Plan for discharge when stable    The above recommendations including medications and orders were discussed and agreed upon with Mony Parrish the attending on service for the cardiothoracic surgery group today.     Electronically signed by CINDY Mckeon on 4/10/2019 at 8:30 AM

## 2019-04-10 NOTE — PLAN OF CARE
Problem: OXYGENATION/RESPIRATORY FUNCTION  Goal: Patient will maintain patent airway  Outcome: Ongoing  Goal: Patient will achieve/maintain normal respiratory rate/effort  Respiratory rate and effort will be within normal limits for the patient   Outcome: Ongoing     Problem: MECHANICAL VENTILATION  Goal: Patient will maintain patent airway  Outcome: Ongoing  Goal: Oral health is maintained or improved  Outcome: Ongoing  Goal: ET tube will be managed safely  Outcome: Ongoing  Goal: Ability to express needs and understand communication  Outcome: Ongoing  Goal: Mobility/activity is maintained at optimum level for patient  Outcome: Ongoing     Problem: ASPIRATION PRECAUTIONS  Goal: Patient?s risk of aspiration is minimized  Outcome: Ongoing     Problem: SKIN INTEGRITY  Goal: Skin integrity is maintained or improved  Outcome: Ongoing     Problem: RESPIRATORY  Intervention: Respiratory assessment  BRONCHOSPASM/BRONCHOCONSTRICTION       [X]  IMPROVE AERATION/BREATH SOUNDS  [X]        ADMINISTER BRONCHODILATOR THERAPY AS APPROPRIATE  [X]        ASSESS BREATH SOUNDS  [ ]         IMPLEMENT AEROSOL/MDI PROTOCOL  [X]        PATIENT EDUCATION AS NEEDED

## 2019-04-10 NOTE — PROGRESS NOTES
INTENSIVE CARE UNIT  Resident Physician Progress Note    Patient - Wale Muse  Date of Admission -  2019  4:48 AM  Date of Evaluation -  4/10/2019  Room and Bed Number -  1012/1012-01   Hospital Day - 3      SUBJECTIVE:     OVERNIGHT EVENTS:           Has a low-grade fever. Patient came off of dobutamine. Review of systems could not be performed because of the patient's mental and physical status    AWAKE & FOLLOWING COMMANDS:  [x] No   [] Yes    SECRETIONS Amount:  [x] Small [] Moderate  [] Large  [] None  Color:     [x] White [] Colored  [] Bloody    SEDATION:  RAAS Score:  [] Propofol gtt  [x] Versed gtt  [] Ativan gtt   [] No Sedation . patient also got dexmedetomidine.   PARALYZED:  [x] No    [] Yes    VASOPRESSORS:  [] No    [] Yes  [] Levophed [] Dopamine [] Vasopressin  [] Dobutamine [] Phenylephrine [] Epinephrine      OBJECTIVE:     VITAL SIGNS:  /60   Pulse 81   Temp 98.2 °F (36.8 °C) (Oral)   Resp 17   Ht 5' 8\" (1.727 m)   Wt 178 lb 9.6 oz (81 kg)   SpO2 99%   BMI 27.16 kg/m²   Tmax over 24 hours:  Temp (24hrs), Av.2 °F (37.3 °C), Min:98.2 °F (36.8 °C), Max:100.2 °F (37.9 °C)      Patient Vitals for the past 8 hrs:   BP Temp Temp src Pulse Resp SpO2 Weight   04/10/19 0745 -- -- -- 81 -- -- --   04/10/19 0741 131/60 98.2 °F (36.8 °C) Oral 80 17 99 % --   04/10/19 0728 -- -- -- 79 -- 99 % --   04/10/19 0645 -- -- -- 78 -- -- --   04/10/19 0630 -- -- -- 77 -- -- --   04/10/19 0615 -- -- -- 79 -- -- --   04/10/19 0609 -- -- -- -- -- -- 178 lb 9.6 oz (81 kg)   04/10/19 0600 -- -- -- 83 -- -- --   04/10/19 0545 -- -- -- 84 -- 96 % --   04/10/19 0530 -- -- -- 84 -- -- --   04/10/19 0515 -- -- -- 83 -- -- --   04/10/19 0500 -- -- -- 83 -- -- --   04/10/19 0445 -- -- -- 83 -- 99 % --   04/10/19 0430 -- -- -- 85 -- -- --   04/10/19 0415 -- -- -- 85 -- -- --   04/10/19 0400 -- -- -- 85 -- -- --   04/10/19 0345 -- -- -- 84 -- -- --   04/10/19 0330 -- -- -- 84 21 98 % --   04/10/19 032 -- 100.2 °F (37.9 °C) Oral 83 -- -- --   04/10/19 0315 -- -- -- 82 -- -- --   04/10/19 0300 -- -- -- 81 -- -- --   04/10/19 0245 -- -- -- 80 -- -- --   04/10/19 0230 -- -- -- 80 -- -- --   04/10/19 0215 -- -- -- 81 -- -- --   04/10/19 0200 -- -- -- 80 -- -- --   04/10/19 0145 -- -- -- 91 -- 98 % --   04/10/19 0130 -- -- -- 90 -- 96 % --   04/10/19 0115 -- -- -- 90 -- 96 % --   04/10/19 0100 -- -- -- 88 -- 98 % --         Intake/Output Summary (Last 24 hours) at 4/10/2019 0850  Last data filed at 4/10/2019 0609  Gross per 24 hour   Intake 3472 ml   Output 2466 ml   Net 1006 ml     Date 04/10/19 0000 - 04/10/19 2359   Shift 8131-8917 7582-2077 4869-8850 24 Hour Total   INTAKE   I.V.(mL/kg) 2037(60.2)   5019(09.2)   NG/GT(mL/kg) 190(2.3)   190(2.3)   Shift Total(mL/kg) 9795(88.5)   6854(26.0)   OUTPUT   Urine(mL/kg/hr) 1050(1.6)   1050   Emesis/NG output(mL/kg) 300(3.7)   300(3.7)   Shift Total(mL/kg) 1350(16.7)   1350(16.7)   Weight (kg) 81 81 81 81     Wt Readings from Last 3 Encounters:   04/10/19 178 lb 9.6 oz (81 kg)     Body mass index is 27.16 kg/m². PHYSICAL EXAM:  Constitutional: Appears well, no distress  EENT: PERRLA,  sclera clear, anicteric, oropharynx clear, no lesions, neck supple with midline trachea. Neck: Supple, symmetrical, trachea midline, no adenopathy, thyroid symmetric, no jvd skin normal  Respiratory: clear to auscultation, no wheezes or rales and unlabored breathing. No intercostal tenderness.   Had course breath sounds bilaterally  Cardiovascular: regular rate and rhythm, normal S1, S2, no murmur noted and 2+ pulses throughout  Abdomen: soft, nontender, nondistended, no masses or organomegaly  Extremities:  peripheral pulses normal, no pedal edema, no clubbing or cyanosis  MEDICATIONS:  Scheduled Meds:   dextrose  12.5 g Intravenous Once    albuterol  2.5 mg Nebulization Q6H    furosemide  20 mg Intravenous BID    ampicillin-sulbactam  3 g Intravenous Q6H    vancomycin (VANCOCIN) intermittent dosing (placeholder)   Other RX Placeholder    sodium chloride flush  10 mL Intravenous 2 times per day    sodium chloride flush  10 mL Intravenous 2 times per day    docusate sodium  100 mg Oral BID    polyethylene glycol  17 g Oral Daily    famotidine  20 mg Oral BID    metoprolol tartrate  25 mg Oral BID    lisinopril  2.5 mg Oral Daily    amiodarone  200 mg Oral BID    chlorhexidine  15 mL Mouth/Throat BID    mupirocin   Nasal BID    therapeutic multivitamin-minerals  1 tablet Oral Daily with breakfast    atorvastatin  40 mg Oral Nightly    enoxaparin  40 mg Subcutaneous Daily    aspirin  81 mg Oral Daily    clopidogrel  75 mg Oral Daily    insulin lispro  0-12 Units Subcutaneous TID WC    insulin lispro  0-6 Units Subcutaneous Nightly    vancomycin (VANCOCIN) IV  1,000 mg Intravenous Q12H     Continuous Infusions:   midazolam      dexmedetomidine (PRECEDEX) IV infusion 1.4 mcg/kg/hr (04/10/19 0502)    sodium chloride 75 mL/hr at 04/07/19 1145    dextrose      phenylephrine (GIANNA-SYNEPHRINE) 50mg/250mL infusion Stopped (04/08/19 1418)    norepinephrine Stopped (04/08/19 0230)     PRN Meds:     sodium chloride flush 10 mL PRN   magnesium hydroxide 30 mL Daily PRN   sodium chloride flush 10 mL PRN   calcium chloride IVPB 1 g PRN   magnesium sulfate 1 g PRN   potassium chloride 20 mEq PRN   acetaminophen 650 mg Q4H PRN   acetaminophen 650 mg Q4H PRN   oxyCODONE-acetaminophen 1 tablet Q4H PRN   Or     oxyCODONE-acetaminophen 2 tablet Q4H PRN   fentanNYL 25 mcg Q1H PRN   Or     fentanNYL 50 mcg Q1H PRN   diphenhydrAMINE 25 mg Nightly PRN   bisacodyl 10 mg Daily PRN   ondansetron 4 mg Q8H PRN   hydrALAZINE 5 mg Q5 Min PRN   albumin human 25 g PRN   glucose 15 g PRN   dextrose 12.5 g PRN   glucagon (rDNA) 1 mg PRN   dextrose 100 mL/hr PRN   phenylephrine (GIANNA-SYNEPHRINE) 50mg/250mL infusion 10 mcg/min Continuous PRN   norepinephrine 0.03 mcg/kg/min Continuous PRN       SUPPORT DEVICES: [x] Ventilator [] BIPAP  [] Nasal Cannula [] Room Air    VENT SETTINGS (Comprehensive) (if applicable):  Vent Information  $Ventilation: $Subsequent Day  Ventilator Started: Yes  Skin Assessment: Clean, dry, & intact  Equipment Changed: Humidification  Vent Type: Servo i  Vent Mode: PRVC  Vt Ordered: 660 mL  Rate Set: 16 bmp  Pressure Support: 10 cmH20  FiO2 : 50 %  Sensitivity: 5  PEEP/CPAP: 10  I Time/ I Time %: 0.9 s  Humidification Source: Heated wire  Humidification Temp: 37  Humidification Temp Measured: 36  Circuit Condensation: Not drained  Nitric Oxide/Epoprostenol In Use?: No  Additional Respiratory  Assessments  Pulse: 81  Resp: 17  SpO2: 99 %  End Tidal CO2: 30 (%)  Position: Semi-Quintana's  Humidification Source: Heated wire  Humidification Temp: 37  Circuit Condensation: Not drained  Oral Care Completed?: Yes  Oral Care: Mouth suctioned, Suction toothette  Subglottic Suction Done?: Yes  Skin barrier applied: Yes    ABGs:     Lab Results   Component Value Date    YGB2ZLH 20 04/10/2019    FIO2 40.0 04/10/2019     7.4/30/55/negative base excess 6    DATA:  Complete Blood Count: Recent Labs     04/08/19  2349 04/09/19  0441 04/10/19  0440   WBC 12.3* 12.5* 14.9*   RBC 3.34* 2.95* 3.34*   HGB 10.3* 8.9* 10.1*   HCT 32.5* 28.5* 32.0*   MCV 97.3 96.6 95.8   MCH 30.8 30.2 30.2   MCHC 31.7 31.2 31.6   RDW 14.1 14.0 13.7   PLT See Reflexed IPF Result See Reflexed IPF Result See Reflexed IPF Result   MPV NOT REPORTED NOT REPORTED NOT REPORTED        Last 3 Blood Glucose:   Recent Labs     04/07/19  1205 04/07/19  1947 04/08/19 0445 04/08/19 2349 04/09/19  0441 04/10/19  0440   GLUCOSE 155* 110* 97 116* 114* 212*        PT/INR:    Lab Results   Component Value Date    PROTIME 17.4 04/10/2019    INR 1.7 04/10/2019     PTT:    Lab Results   Component Value Date    APTT 25.7 04/07/2019       Comprehensive Metabolic Profile:   Recent Labs     04/07/19  1947  04/08/19  2349 04/09/19  0239 04/09/19  0441 04/10/19  0440   *   < > 140  --  141 139   K 3.9   < > 6.0* 3.9 4.2 4.4   *   < > 115*  --  115* 112*   CO2 24   < > 15*  --  18* 18*   BUN 16   < > 19  --  20 22*   CREATININE 0.66*   < > 0.90  --  0.86 0.41*   GLUCOSE 110*   < > 116*  --  114* 212*   CALCIUM 8.1*   < > 7.4*  --  8.0* 7.5*   PROT 5.1*  --   --   --   --   --    LABALBU 3.8  --   --   --   --   --    BILITOT 0.71  --   --   --   --   --    ALKPHOS 37*  --   --   --   --   --    *  --   --   --   --   --    ALT 63*  --   --   --   --   --     < > = values in this interval not displayed. Magnesium:   Lab Results   Component Value Date    MG 2.0 04/10/2019    MG 1.9 04/09/2019    MG 2.1 04/08/2019     Phosphorus: No results found for: PHOS  Ionized Calcium:   Lab Results   Component Value Date    CAION 0.97 04/09/2019    CAION 1.01 04/07/2019        Urinalysis: No results found for: [place], PHUR, LABCAST, WBCUA, RBCUA, MUCUS, TRICHOMONAS, YEAST, BACTERIA, CLARITYU, SPECGRAV, LEUKOCYTESUR, UROBILINOGEN, BILIRUBINUR, BLOODU, GLUCOSEU, KETUA, AMORPHOUS    HgBA1c:    Lab Results   Component Value Date    LABA1C 10.8 04/07/2019     TSH:  No results found for: TSH    Lactic Acid: No results found for: LACTA   Troponin: No results for input(s): TROPONINI in the last 72 hours. Microbiology:  Chest swab with no growth so far. Other Labs:      Radiology/Imaging:  Chest x-ray with cardiomegaly and bilateral pulmonary vascular congestion and effusions. ASSESSMENT:     Principal Problem:    Cardiac arrest ([facility] [address]  Active Problems:    Acute ST elevation myocardial infarction (STEMI) of anterior wall ([facility])    Severe left ventricular systolic dysfunction    Acute respiratory failure with hypoxemia ([facility])    Cardiogenic pulmonary edema ([facility])    PAD (peripheral artery disease) ([facility])    Subacute osteomyelitis of right tibia ([facility])    Exposed orthopaedic hardware ([address]  Resolved Problems:    * No resolved hospital problems.  *  Normal anion gap metabolic acidosis. Leukocytosis, slightly worse. Hyperglycemia secondary to stress. Anemia, slightly better. Thrombocytopenia, unchanged. Amiodarone use      PLAN:     WEAN PER PROTOCOL:  [x] No   [] Yes  [] N/A    DISCONTINUE ANY LABS:   [] No   [] Yes    ICU PROPHYLAXIS:  Stress ulcer:  [] PPI Agent  [x] [id number] [] Sucralfate  [] Other:  VTE:   [x] Enoxaparin  [] Unfract. Heparin Subcut  [] EPC Cuffs    NUTRITION:  [x] NPO [] Tube Feeding  [] TPN  [] PO    HOME MEDICATIONS RECONCILED: [] No  [] Yes    INSULIN DRIP:   [x] No   [] Yes    CONSULTATION NEEDED:  [] No   [] Yes    FAMILY UPDATED:    [] No   [] Yes    TRANSFER OUT OF ICU:   [x] No   [] Yes    ADDITIONAL PLAN:  - Continue efforts to decrease oxygen as the patient is diuresed  -Patient is on Lasix  -On bronchodilators. Patient has a minute ventilation of 12 L with a PEEP of 10. We'll make effort to decrease the PEEP on the oxygen as the patient is diuresed. Total critical care time caring for this patient with life threatening, unstable organ failure, including direct patient contact, management of life support systems, review of data including imaging and labs, discussions with other team members and physicians at least 27  Min so far today, excluding procedures. Please note that this chart was generated using voice recognition dictation software. Although every effort was made to ensure the accuracy of this automated transcription, some errors in transcription may have occurred.          Jorge Webster MD  4/10/2019 8:50 AM

## 2019-04-10 NOTE — PROGRESS NOTES
04/10/19  0440   WBC 12.3* 12.5* 14.9*   HGB 10.3* 8.9* 10.1*   PLT See Reflexed IPF Result See Reflexed IPF Result See Reflexed IPF Result     BMP:    Recent Labs     04/08/19  2349 04/09/19  0239 04/09/19  0441 04/10/19  0440     --  141 139   K 6.0* 3.9 4.2 4.4   *  --  115* 112*   CO2 15*  --  18* 18*   BUN 19  --  20 22*   CREATININE 0.90  --  0.86 0.41*   GLUCOSE 116*  --  114* 212*     Hepatic:   Recent Labs     04/07/19 1947   *   ALT 63*   BILITOT 0.71   ALKPHOS 37*     Troponin: No results for input(s): TROPONINI in the last 72 hours. BNP: No results for input(s): BNP in the last 72 hours. Lipids: No results for input(s): CHOL, HDL in the last 72 hours. Invalid input(s): LDLCALCU  INR:   Recent Labs     04/08/19 0445 04/09/19  0441 04/10/19  0440   INR 1.3 2.0 1.7       Objective:   Vitals: /60   Pulse 81   Temp 98.2 °F (36.8 °C) (Oral)   Resp 17   Ht 5' 8\" (1.727 m)   Wt 178 lb 9.6 oz (81 kg)   SpO2 99%   BMI 27.16 kg/m²     General appearance: sedated and intubated  HEENT: Head: Normocephalic, no lesions, without obvious abnormality  Neck: no JVD  Lungs:  Mechanical ventilator  Heart: regular rate and rhythm, S1, S2 normal, no murmur, click, rub or gallop  Abdomen: soft, non-tender; bowel sounds normal  Extremities: No LE edema  Neurologic: paralytic      EKG: Sinus tachycardia/ Anterior septal infarct      Echocardiogram:  pending    4/7/19  Coronary Angiography:     LMCA: has distal 60% stenosis. LAD: has ostial 90% stenosis and long proximal 80% stenosis. D1 has proximal 70% stenosis. LCx: has 20% stenosis. OM1 is very small. OM2 has proximal 90% stenosis. RCA: has proximal 50% stenosis, mid 60% stenosis and distal 80% stenosis. Ramus: has ostial 99% stenosis.     The LV gram was performed in the DUTTON 30 position. LVEF: 15%. LV Wall Motion: severe hypokinesis of the anterior wall and mid inferior wall with apical akinesis.          Assessment: 1 PEA Arrest( Multiple times) ROSC in few minutes S/p weaning off from paralytic  2 Late presentation of Anterior septal STEMI   3 Multivessel CAD s/p emergent CABG( 4/7) ( LIMA-LAD, SVG-PDA,OM 1 & distal circumflex) 4/7 S/P sternal closure on 4/8  4 Ischemic cardiomyopathy with acute systolic CHF( ef 57% ANGELA)  5 NSVT  6 Post op anemia  7 Post op thrombocytopenia  8 Acute pulmonary edema S/p intubation  9 Leukocytosis on Abx       Treatment Plan:   1  Urgent CABG( 4/7). Sternal closure ( 4/8). On minimum dose of dobutamine. SBP in 130. Overnight, was restless and trying to pull the tubes out. Switch to versed from propofol and continue on precidex. Fentanyl PRN. Will recommend increasing the dose of lasix to 40 IV BID, since ABG in morning, showed Po2 55. Xray showing still congestion. Will do the ECHO once off the pressors. Wean off dobutamine. Lopressor 25 mg BID  2  Post op care per CT Surgery. 3  Continue with Asa, plavix and lipitor. 4 Keep K>4, Mag>2 and Calcium in normal range. 5  Low grade temp. ID follow up. On Vancomycin for possible tibial prosthesis infection. unasyn for sinusitis Follow CBC & BMP. Discussed with patient and nursing. Sarah Garcia MD  Fellow cardiology    Attending note,   The patient was seen and examined, intubated and sedated, wean off pressors as tolerated, continue supportive treatment and current medications, will follow.

## 2019-04-10 NOTE — PROGRESS NOTES
Nutrition Assessment    Type and Reason for Visit: Reassess, Consult(TF ordering and management )    Nutrition Recommendations:   -Continue NPO status  -Recommend tube feeding of Vital AF 1.2 (Semi-elemental and carb controlled) @ goal rate of 60 mL/hr until 1440 mLs infused (x24 hrs) ~>1728 kcals, 108 gms protein, 1168 mLs water  -Will monitor EN intake/toleracen; will adjust as needed. Nutrition Assessment: RD consulted for tube feeding to start today. Pt s/p CABG, intubated and sedated. Will monitor EN.     Malnutrition Assessment:  · Malnutrition Status: Insufficient data    Nutrition Risk Level: High    Nutrient Needs:  · Estimated Daily Total Kcal: 20-25 kcal/kg ~>3436-8246 kcals/d   · Estimated Daily Protein (g): 1.2-2.0 gm/kg ~> gms/d     Nutrition Diagnosis:   · Problem: Inadequate oral intake  · Etiology: related to Impaired respiratory function-inability to consume food, Insufficient energy/nutrient consumption     Signs and symptoms:  as evidenced by NPO status due to medical condition, Intubation, Nutrition support - EN    Objective Information:  · Nutrition-Focused Physical Findings: -  · Wound Type: Pressure Ulcer, Stage II, Open Wounds, Multiple  · Current Nutrition Therapies:  · Oral Diet Orders: NPO   · Anthropometric Measures:  · Ht: 5' 8\" (172.7 cm)   · Current Body Wt: 178 lb (80.7 kg)  · Admission Body Wt: 176 lb (79.8 kg)  · % Weight Change:  ,  No wt hx in EMR   · Ideal Body Wt: 154 lb (69.9 kg), % Ideal Body 114% (adm/ideal)  · BMI Classification: BMI 25.0 - 29.9 Overweight    Nutrition Interventions:   Continue NPO, Start Tube Feeding  Continued Inpatient Monitoring, Education not appropriate at this time    Nutrition Evaluation:   · Evaluation: Goals set   · Goals: Pt to meet % of est'd needs via EN    · Monitoring: Nutrition Progression, TF Intake, TF Tolerance, Skin Integrity, Wound Healing, I&O, Weight, Pertinent Labs, Monitor Bowel Function, Monitor Hemodynamic

## 2019-04-10 NOTE — PLAN OF CARE
Problem: OXYGENATION/RESPIRATORY FUNCTION  Goal: Patient will maintain patent airway  4/10/2019 0738 by Tanner Villarreal RCP  Outcome: Ongoing  4/9/2019 1915 by Romayne Bidding, RN  Outcome: Ongoing  Goal: Patient will achieve/maintain normal respiratory rate/effort  Description  Respiratory rate and effort will be within normal limits for the patient  Outcome: Ongoing     Problem: MECHANICAL VENTILATION  Goal: Patient will maintain patent airway  Outcome: Ongoing  Goal: Oral health is maintained or improved  Outcome: Ongoing  Goal: ET tube will be managed safely  Outcome: Ongoing  Goal: Ability to express needs and understand communication  Outcome: Ongoing  Goal: Mobility/activity is maintained at optimum level for patient  Outcome: Ongoing

## 2019-04-11 LAB
ALLEN TEST: ABNORMAL
ANION GAP SERPL CALCULATED.3IONS-SCNC: 9 MMOL/L (ref 9–17)
BUN BLDV-MCNC: 19 MG/DL (ref 6–20)
BUN/CREAT BLD: ABNORMAL (ref 9–20)
CALCIUM SERPL-MCNC: 7.2 MG/DL (ref 8.6–10.4)
CHLORIDE BLD-SCNC: 108 MMOL/L (ref 98–107)
CO2: 20 MMOL/L (ref 20–31)
CREAT SERPL-MCNC: 0.43 MG/DL (ref 0.7–1.2)
EKG ATRIAL RATE: 80 BPM
EKG ATRIAL RATE: 84 BPM
EKG P AXIS: 63 DEGREES
EKG P AXIS: 66 DEGREES
EKG P-R INTERVAL: 170 MS
EKG P-R INTERVAL: 176 MS
EKG Q-T INTERVAL: 386 MS
EKG Q-T INTERVAL: 428 MS
EKG QRS DURATION: 90 MS
EKG QRS DURATION: 94 MS
EKG QTC CALCULATION (BAZETT): 456 MS
EKG QTC CALCULATION (BAZETT): 493 MS
EKG R AXIS: 47 DEGREES
EKG R AXIS: 56 DEGREES
EKG T AXIS: 116 DEGREES
EKG T AXIS: 92 DEGREES
EKG VENTRICULAR RATE: 80 BPM
EKG VENTRICULAR RATE: 84 BPM
FIO2: 50
GFR AFRICAN AMERICAN: >60 ML/MIN
GFR NON-AFRICAN AMERICAN: >60 ML/MIN
GFR SERPL CREATININE-BSD FRML MDRD: ABNORMAL ML/MIN/{1.73_M2}
GFR SERPL CREATININE-BSD FRML MDRD: ABNORMAL ML/MIN/{1.73_M2}
GLUCOSE BLD-MCNC: 137 MG/DL (ref 75–110)
GLUCOSE BLD-MCNC: 147 MG/DL (ref 75–110)
GLUCOSE BLD-MCNC: 155 MG/DL (ref 75–110)
GLUCOSE BLD-MCNC: 200 MG/DL (ref 74–100)
GLUCOSE BLD-MCNC: 207 MG/DL (ref 70–99)
HCT VFR BLD CALC: 32.2 % (ref 40.7–50.3)
HEMOGLOBIN: 10.1 G/DL (ref 13–17)
INR BLD: 1.4
MAGNESIUM: 2.1 MG/DL (ref 1.6–2.6)
MCH RBC QN AUTO: 30 PG (ref 25.2–33.5)
MCHC RBC AUTO-ENTMCNC: 31.4 G/DL (ref 28.4–34.8)
MCV RBC AUTO: 95.5 FL (ref 82.6–102.9)
MODE: ABNORMAL
NEGATIVE BASE EXCESS, ART: 3 (ref 0–2)
NRBC AUTOMATED: 0.9 PER 100 WBC
O2 DEVICE/FLOW/%: ABNORMAL
PATIENT TEMP: 37.4
PDW BLD-RTO: 13.6 % (ref 11.8–14.4)
PLATELET # BLD: 100 K/UL (ref 138–453)
PMV BLD AUTO: 11.8 FL (ref 8.1–13.5)
POC HCO3: 21.4 MMOL/L (ref 21–28)
POC O2 SATURATION: 96 % (ref 94–98)
POC PCO2 TEMP: 35 MM HG
POC PCO2: 34.1 MM HG (ref 35–48)
POC PH TEMP: 7.4
POC PH: 7.41 (ref 7.35–7.45)
POC PO2 TEMP: 83 MM HG
POC PO2: 80.4 MM HG (ref 83–108)
POSITIVE BASE EXCESS, ART: ABNORMAL (ref 0–3)
POTASSIUM SERPL-SCNC: 3.6 MMOL/L (ref 3.7–5.3)
PROTHROMBIN TIME: 14.1 SEC (ref 9–12)
RBC # BLD: 3.37 M/UL (ref 4.21–5.77)
SAMPLE SITE: ABNORMAL
SODIUM BLD-SCNC: 137 MMOL/L (ref 135–144)
TCO2 (CALC), ART: 23 MMOL/L (ref 22–29)
WBC # BLD: 10.2 K/UL (ref 3.5–11.3)

## 2019-04-11 PROCEDURE — 85610 PROTHROMBIN TIME: CPT

## 2019-04-11 PROCEDURE — 85027 COMPLETE CBC AUTOMATED: CPT

## 2019-04-11 PROCEDURE — 82947 ASSAY GLUCOSE BLOOD QUANT: CPT

## 2019-04-11 PROCEDURE — 94770 HC ETCO2 MONITOR DAILY: CPT

## 2019-04-11 PROCEDURE — 80048 BASIC METABOLIC PNL TOTAL CA: CPT

## 2019-04-11 PROCEDURE — 36415 COLL VENOUS BLD VENIPUNCTURE: CPT

## 2019-04-11 PROCEDURE — 6360000002 HC RX W HCPCS

## 2019-04-11 PROCEDURE — 94762 N-INVAS EAR/PLS OXIMTRY CONT: CPT

## 2019-04-11 PROCEDURE — 94640 AIRWAY INHALATION TREATMENT: CPT

## 2019-04-11 PROCEDURE — 6360000002 HC RX W HCPCS: Performed by: THORACIC SURGERY (CARDIOTHORACIC VASCULAR SURGERY)

## 2019-04-11 PROCEDURE — 82803 BLOOD GASES ANY COMBINATION: CPT

## 2019-04-11 PROCEDURE — 6360000002 HC RX W HCPCS: Performed by: INTERNAL MEDICINE

## 2019-04-11 PROCEDURE — 87040 BLOOD CULTURE FOR BACTERIA: CPT

## 2019-04-11 PROCEDURE — 2580000003 HC RX 258: Performed by: NURSE PRACTITIONER

## 2019-04-11 PROCEDURE — 6370000000 HC RX 637 (ALT 250 FOR IP): Performed by: NURSE PRACTITIONER

## 2019-04-11 PROCEDURE — 83735 ASSAY OF MAGNESIUM: CPT

## 2019-04-11 PROCEDURE — 99291 CRITICAL CARE FIRST HOUR: CPT | Performed by: INTERNAL MEDICINE

## 2019-04-11 PROCEDURE — 76937 US GUIDE VASCULAR ACCESS: CPT

## 2019-04-11 PROCEDURE — 97606 NEG PRS WND THER DME>50 SQCM: CPT

## 2019-04-11 PROCEDURE — 2700000000 HC OXYGEN THERAPY PER DAY

## 2019-04-11 PROCEDURE — 2500000003 HC RX 250 WO HCPCS: Performed by: THORACIC SURGERY (CARDIOTHORACIC VASCULAR SURGERY)

## 2019-04-11 PROCEDURE — 99024 POSTOP FOLLOW-UP VISIT: CPT | Performed by: PHYSICIAN ASSISTANT

## 2019-04-11 PROCEDURE — 2580000003 HC RX 258: Performed by: THORACIC SURGERY (CARDIOTHORACIC VASCULAR SURGERY)

## 2019-04-11 PROCEDURE — 99233 SBSQ HOSP IP/OBS HIGH 50: CPT | Performed by: INTERNAL MEDICINE

## 2019-04-11 PROCEDURE — 6370000000 HC RX 637 (ALT 250 FOR IP): Performed by: PHYSICIAN ASSISTANT

## 2019-04-11 PROCEDURE — 2100000001 HC CVICU R&B

## 2019-04-11 PROCEDURE — 2580000003 HC RX 258: Performed by: INTERNAL MEDICINE

## 2019-04-11 PROCEDURE — 94003 VENT MGMT INPAT SUBQ DAY: CPT

## 2019-04-11 PROCEDURE — P9041 ALBUMIN (HUMAN),5%, 50ML: HCPCS | Performed by: NURSE PRACTITIONER

## 2019-04-11 PROCEDURE — 6360000002 HC RX W HCPCS: Performed by: NURSE PRACTITIONER

## 2019-04-11 RX ORDER — VANCOMYCIN HYDROCHLORIDE 1 G/200ML
1000 INJECTION, SOLUTION INTRAVENOUS EVERY 12 HOURS
Status: DISCONTINUED | OUTPATIENT
Start: 2019-04-11 | End: 2019-04-12

## 2019-04-11 RX ORDER — FUROSEMIDE 10 MG/ML
INJECTION INTRAMUSCULAR; INTRAVENOUS
Status: COMPLETED
Start: 2019-04-11 | End: 2019-04-11

## 2019-04-11 RX ORDER — ASPIRIN 81 MG/1
81 TABLET, CHEWABLE ORAL DAILY
Status: DISCONTINUED | OUTPATIENT
Start: 2019-04-11 | End: 2019-04-25 | Stop reason: HOSPADM

## 2019-04-11 RX ORDER — FUROSEMIDE 10 MG/ML
40 INJECTION INTRAMUSCULAR; INTRAVENOUS 2 TIMES DAILY
Status: DISCONTINUED | OUTPATIENT
Start: 2019-04-11 | End: 2019-04-14

## 2019-04-11 RX ADMIN — FENTANYL CITRATE 50 MCG: 50 INJECTION INTRAMUSCULAR; INTRAVENOUS at 05:07

## 2019-04-11 RX ADMIN — FUROSEMIDE 40 MG: 10 INJECTION, SOLUTION INTRAMUSCULAR; INTRAVENOUS at 17:08

## 2019-04-11 RX ADMIN — INSULIN LISPRO 2 UNITS: 100 INJECTION, SOLUTION INTRAVENOUS; SUBCUTANEOUS at 13:47

## 2019-04-11 RX ADMIN — Medication 100 MG: at 08:45

## 2019-04-11 RX ADMIN — ALBUTEROL SULFATE 2.5 MG: 2.5 SOLUTION RESPIRATORY (INHALATION) at 13:43

## 2019-04-11 RX ADMIN — POTASSIUM CHLORIDE 30 MEQ: 29.8 INJECTION, SOLUTION INTRAVENOUS at 06:25

## 2019-04-11 RX ADMIN — AMIODARONE HYDROCHLORIDE 200 MG: 200 TABLET ORAL at 21:40

## 2019-04-11 RX ADMIN — METOPROLOL TARTRATE 25 MG: 25 TABLET ORAL at 08:27

## 2019-04-11 RX ADMIN — AMIODARONE HYDROCHLORIDE 200 MG: 200 TABLET ORAL at 08:25

## 2019-04-11 RX ADMIN — ALBUTEROL SULFATE 2.5 MG: 2.5 SOLUTION RESPIRATORY (INHALATION) at 07:45

## 2019-04-11 RX ADMIN — POLYETHYLENE GLYCOL 3350 17 G: 17 POWDER, FOR SOLUTION ORAL at 08:25

## 2019-04-11 RX ADMIN — ACETAMINOPHEN 650 MG: 325 TABLET ORAL at 21:49

## 2019-04-11 RX ADMIN — Medication 4 MG/HR: at 06:32

## 2019-04-11 RX ADMIN — DESMOPRESSIN ACETATE 40 MG: 0.2 TABLET ORAL at 21:40

## 2019-04-11 RX ADMIN — VANCOMYCIN HYDROCHLORIDE 1000 MG: 1 INJECTION, SOLUTION INTRAVENOUS at 16:34

## 2019-04-11 RX ADMIN — FUROSEMIDE 40 MG: 10 INJECTION INTRAMUSCULAR; INTRAVENOUS at 17:08

## 2019-04-11 RX ADMIN — ALBUMIN (HUMAN) 25 G: 12.5 INJECTION, SOLUTION INTRAVENOUS at 21:30

## 2019-04-11 RX ADMIN — CLOPIDOGREL 75 MG: 75 TABLET, FILM COATED ORAL at 08:25

## 2019-04-11 RX ADMIN — MULTIPLE VITAMINS W/ MINERALS TAB 1 TABLET: TAB at 08:25

## 2019-04-11 RX ADMIN — ALBUTEROL SULFATE 2.5 MG: 2.5 SOLUTION RESPIRATORY (INHALATION) at 03:18

## 2019-04-11 RX ADMIN — Medication 15 ML: at 08:39

## 2019-04-11 RX ADMIN — FENTANYL CITRATE 50 MCG: 50 INJECTION INTRAMUSCULAR; INTRAVENOUS at 08:21

## 2019-04-11 RX ADMIN — MUPIROCIN: 20 OINTMENT TOPICAL at 08:24

## 2019-04-11 RX ADMIN — FUROSEMIDE 20 MG: 10 INJECTION, SOLUTION INTRAMUSCULAR; INTRAVENOUS at 08:25

## 2019-04-11 RX ADMIN — DEXMEDETOMIDINE HYDROCHLORIDE 1.4 MCG/KG/HR: 100 INJECTION, SOLUTION INTRAVENOUS at 01:17

## 2019-04-11 RX ADMIN — AMPICILLIN SODIUM AND SULBACTAM SODIUM 3 G: 2; 1 INJECTION, POWDER, FOR SOLUTION INTRAMUSCULAR; INTRAVENOUS at 08:25

## 2019-04-11 RX ADMIN — INSULIN LISPRO 2 UNITS: 100 INJECTION, SOLUTION INTRAVENOUS; SUBCUTANEOUS at 09:26

## 2019-04-11 RX ADMIN — FAMOTIDINE 20 MG: 20 TABLET, FILM COATED ORAL at 08:25

## 2019-04-11 RX ADMIN — Medication 10 ML: at 08:31

## 2019-04-11 RX ADMIN — ALBUTEROL SULFATE 2.5 MG: 2.5 SOLUTION RESPIRATORY (INHALATION) at 19:11

## 2019-04-11 RX ADMIN — ENOXAPARIN SODIUM 40 MG: 40 INJECTION SUBCUTANEOUS at 08:25

## 2019-04-11 RX ADMIN — FAMOTIDINE 20 MG: 20 TABLET, FILM COATED ORAL at 21:40

## 2019-04-11 RX ADMIN — OXYCODONE HYDROCHLORIDE AND ACETAMINOPHEN 2 TABLET: 5; 325 TABLET ORAL at 08:21

## 2019-04-11 RX ADMIN — AMPICILLIN SODIUM AND SULBACTAM SODIUM 3 G: 2; 1 INJECTION, POWDER, FOR SOLUTION INTRAMUSCULAR; INTRAVENOUS at 03:19

## 2019-04-11 RX ADMIN — DEXMEDETOMIDINE HYDROCHLORIDE 1.4 MCG/KG/HR: 100 INJECTION, SOLUTION INTRAVENOUS at 05:41

## 2019-04-11 RX ADMIN — LISINOPRIL 2.5 MG: 2.5 TABLET ORAL at 08:28

## 2019-04-11 RX ADMIN — Medication 10 ML: at 08:30

## 2019-04-11 RX ADMIN — OXYCODONE HYDROCHLORIDE AND ACETAMINOPHEN 2 TABLET: 5; 325 TABLET ORAL at 16:51

## 2019-04-11 RX ADMIN — METOPROLOL TARTRATE 25 MG: 25 TABLET ORAL at 21:40

## 2019-04-11 RX ADMIN — MUPIROCIN: 20 OINTMENT TOPICAL at 21:45

## 2019-04-11 RX ADMIN — ASPIRIN 81 MG 81 MG: 81 TABLET ORAL at 08:45

## 2019-04-11 ASSESSMENT — PULMONARY FUNCTION TESTS
PIF_VALUE: 27
PIF_VALUE: 30
PIF_VALUE: 29
PIF_VALUE: 32
PIF_VALUE: 31
PIF_VALUE: 31
PIF_VALUE: 28
PIF_VALUE: 30
PIF_VALUE: 32
PIF_VALUE: 32
PIF_VALUE: 31
PIF_VALUE: 29
PIF_VALUE: 31
PIF_VALUE: 32
PIF_VALUE: 30
PIF_VALUE: 31
PIF_VALUE: 31
PIF_VALUE: 30

## 2019-04-11 ASSESSMENT — PAIN SCALES - GENERAL
PAINLEVEL_OUTOF10: 0
PAINLEVEL_OUTOF10: 7
PAINLEVEL_OUTOF10: 0
PAINLEVEL_OUTOF10: 7

## 2019-04-11 NOTE — PLAN OF CARE
Problem: OXYGENATION/RESPIRATORY FUNCTION  Goal: Patient will maintain patent airway  4/11/2019 1946 by Junaid Leonard RCP  Outcome: Ongoing     Problem: OXYGENATION/RESPIRATORY FUNCTION  Goal: Patient will achieve/maintain normal respiratory rate/effort  Description  Respiratory rate and effort will be within normal limits for the patient  4/11/2019 1946 by Junaid Leonard RCP  Outcome: Ongoing     Problem: MECHANICAL VENTILATION  Goal: Patient will maintain patent airway  4/11/2019 1946 by Junaid Leonard RCP  Outcome: Ongoing     Problem: MECHANICAL VENTILATION  Goal: Oral health is maintained or improved  4/11/2019 1946 by Junaid Leonard RCP  Outcome: Ongoing     Problem: MECHANICAL VENTILATION  Goal: ET tube will be managed safely  4/11/2019 1946 by Junaid Leonard RCP  Outcome: Ongoing     Problem: MECHANICAL VENTILATION  Goal: Ability to express needs and understand communication  4/11/2019 1946 by Junaid Leonard RCP  Outcome: Ongoing     Problem: MECHANICAL VENTILATION  Goal: Mobility/activity is maintained at optimum level for patient  4/11/2019 1946 by Junaid Leonard RCP  Outcome: Ongoing     Problem: SKIN INTEGRITY  Goal: Skin integrity is maintained or improved  Outcome: Ongoing     BRONCHOSPASM/BRONCHOCONSTRICTION     x         IMPROVE AERATION/BREATH SOUNDS  x   ADMINISTER BRONCHODILATOR THERAPY AS APPROPRIATE  x   ASSESS BREATH SOUNDS  x   IMPLEMENT AEROSOL/MDI PROTOCOL  x   PATIENT EDUCATION AS NEEDED

## 2019-04-11 NOTE — PROGRESS NOTES
Nutrition Assessment (Enteral Nutrition)    Type and Reason for Visit: Reassess    Nutrition Recommendations:   -Continue NPO status  -Restart tube feeding as able of Vital AF 1.2 (Semi-elemental and carb controlled) @ goal rate of 60 mL/hr until 1440 mLs infused (x24 hrs) ~>1728 kcals, 108 gms protein, 1168 mLs water  -Will monitor EN intake/tolerance; will adjust as needed. Nutrition Assessment: Tube feed currently held 2/2 high residuals (450 mLs) at this time per RN. Pt s/p CABG, remains intubated and sedated. Restart tube feeding as medically able.      Malnutrition Assessment:  · Malnutrition Status: Insufficient data    Nutrition Risk Level: High    Nutrition Needs:  · Estimated Daily Total Kcal: 20-25 kcal/kg ~>7444-9050 kcals/d   · Estimated Daily Protein (g): 1.2-2.0 gm/kg ~> gms/d     Nutrition Diagnosis:   · Problem: Inadequate oral intake  · Etiology: related to Impaired respiratory function-inability to consume food, Insufficient energy/nutrient consumption     Signs and symptoms:  as evidenced by NPO status due to medical condition, Intubation, Nutrition support - EN    Objective Information:  · Wound Type: Multiple, Open Wounds  · Current Nutrition Therapies:  · Oral Diet Orders: NPO   · Tube Feeding (TF) Orders:   · Feeding Route:  Nasogastric  · Formula: Semi-elemental  · Duration: Continuous  · Goal TF & Flush Orders Provides: Vital AF 1.2 (Semi-elemental and carb controlled) @ goal rate of 60 mL/hr until 1440 mLs infused (x24 hrs) ~>1728 kcals, 108 gms protein, 1168 mLs water  · Additional Calories: propofol not running @ this time  · Anthropometric Measures:  · Ht: 5' 8\" (172.7 cm)   · Current Body Wt: 179 lb (81.2 kg)  · Admission Body Wt: 176 lb (79.8 kg)  · Weight Change: No wt hx in EMR    · Ideal Body Wt: 154 lb (69.9 kg), % Ideal Body 114% (adm/ideal)  · BMI Classification: BMI 25.0 - 29.9 Overweight    Nutrition Interventions:   Continue NPO(Restart tube feeding as able)  Continued Inpatient Monitoring, Education not appropriate at this time    Nutrition Evaluation:   · Evaluation: Progress towards goals declining   · Goals: Pt to meet % of est'd needs via EN   · Monitoring: Nutrition Progression, TF Intake, TF Tolerance, Skin Integrity, Wound Healing, I&O, Weight, Pertinent Labs, Monitor Bowel Function, Monitor Hemodynamic Status      Electronically signed by Ashely Bosch RD, LD on 4/11/19 at 10:41 AM    Contact Number: [phone]

## 2019-04-11 NOTE — CARE COORDINATION
Transition planning patient remains intubated reviewed help notes medicaid application number [id number]. Need patients ss # from family. Await extubation and pt/ot evals to determine disposition   12:00 met with son River Larkin while visiting said he gave ss # to help.  Called [facility] requesting ss #  12:30 response from [employer] with help ss # [phone] called registration

## 2019-04-11 NOTE — PLAN OF CARE
Problem: OXYGENATION/RESPIRATORY FUNCTION  Goal: Patient will maintain patent airway  4/11/2019 0752 by Patricio Nieto RCP  Outcome: Ongoing  4/11/2019 0639 by Cindy Jose RN  Outcome: Ongoing     Problem: MECHANICAL VENTILATION  Goal: Patient will maintain patent airway  4/11/2019 0752 by Patricio Nieto RCP  Outcome: Ongoing  4/11/2019 0639 by Cindy Jose RN  Outcome: Ongoing     Problem: MECHANICAL VENTILATION  Goal: Oral health is maintained or improved  4/11/2019 0752 by Patricio Nieto RCP  Outcome: Ongoing  4/11/2019 0639 by Cindy Jose RN  Outcome: Ongoing     Problem: MECHANICAL VENTILATION  Goal: ET tube will be managed safely  4/11/2019 0752 by Patricio Nieto RCP  Outcome: Ongoing  4/11/2019 0639 by Cindy Jose RN  Outcome: Ongoing     Problem: MECHANICAL VENTILATION  Goal: Ability to express needs and understand communication  4/11/2019 0752 by Patricio Nieto RCP  Outcome: Ongoing  4/11/2019 0639 by Cindy Jose RN  Outcome: Ongoing     Problem: MECHANICAL VENTILATION  Goal: Mobility/activity is maintained at optimum level for patient  4/11/2019 0752 by Patricio Nieto RCP  Outcome: Ongoing  4/11/2019 0639 by Cindy Jose RN  Outcome: Ongoing

## 2019-04-11 NOTE — PROGRESS NOTES
[facility]  Progress Note    4/11/2019 7:49 AM  Surgeon:  Dr. Pascale Morrison     POD# 4   S/P :  Emerg Coronary artery bypass 4/7/19  Chest closure 4/8/19  EF: 45 %    Subjective:  Mr. Latrice Greene seen and examined today. Sedated, intubated    Vital Signs: BP (!) 117/55   Pulse 81   Temp 99.1 °F (37.3 °C) (Axillary)   Resp 20   Ht 5' 8\" (1.727 m)   Wt 179 lb 9 oz (81.4 kg)   SpO2 98%   BMI 27.30 kg/m²      Admit Weight: Weight: 176 lb 5.9 oz (80 kg)   WEIGHTWeight: 179 lb 9 oz (81.4 kg)     Rhythm: NSR    Labs:   CBC:   Recent Labs     04/09/19  0441 04/10/19  0440 04/11/19  0456   WBC 12.5* 14.9* 10.2   HGB 8.9* 10.1* 10.1*   HCT 28.5* 32.0* 32.2*   MCV 96.6 95.8 95.5   PLT See Reflexed IPF Result See Reflexed IPF Result 100*     BMP:   Recent Labs     04/09/19  0441 04/10/19  0440 04/11/19  0456    139 137   K 4.2 4.4 3.6*   * 112* 108*   CO2 18* 18* 20   BUN 20 22* 19   CREATININE 0.86 0.41* 0.43*     PT/INR:   Recent Labs     04/09/19  0441 04/10/19  0440 04/11/19  0456   PROTIME 19.8* 17.4* 14.1*   INR 2.0 1.7 1.4     APTT:   No results for input(s): APTT in the last 72 hours. Chest X-Ray:  ere is redemonstration of bilateral pulmonary congestion/edema that is  similar compared to prior. Ruby Sans are bibasilar effusions. Ruby Sans is no  pneumothorax.  The heart is prominent.  The upper abdomen is unremarkable. The extrathoracic soft tissues are unremarkable. Ruby Sans is an endotracheal  tube with the tip in the midtrachea.  There is a gastric tube with the tip in  the proximal aspect of the stomach.  The right internal jugular line has been  removed.  The [place]-Carly catheter has been removed    I/O:  I/O last 3 completed shifts: In: 2461.7 [I.V.:2256. 7; NG/GT:205]  Out: 2520 [Urine:2520]  Air Leak:  No air leak     Scheduled Meds:    dextrose  12.5 g Intravenous Once    albuterol  2.5 mg Nebulization Q6H    furosemide  20 mg Intravenous BID    ampicillin-sulbactam  3 g Intravenous

## 2019-04-11 NOTE — PROGRESS NOTES
[facility] - Progress Note    Today's Date and Time: 4/11/2019, 11:41 AM    Impression :   1. CAD with cardiac arrest x 3. (on the field, in ER and in OR)  2. S/P emergent CABG x 4 on 4-7-19  3. S/p delayed primary closure of chest incision on 4-8-19  4. Improvement in Poor cardiac function with LVEF 10%-->45%  5. Open chest post CABG. Plans for closure on 4-8-19  6. Exposed orthopedic hardware  7. S/P removal of Protruding orthopedic nail from Rt lateral knee area. Old blood and malodor. 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 8. S/P ORIF Rt proximal tibia. Non compliant with follow up. Recommendations:   · Monitor off antibiotics  · Discontinue vancomycin and Unasyn    Medical Decision Making/Summary/Discussion:   · Patient admitted with chest pain and cardiac arrests x 3  · Emergent CABG x 4  · Found to have protruding Rt tibial orthopedic screw from prior ORIF of tibial fracture  · No signs of infection at present  · Will plan to d/c vancomycin in next 24-48 hr  Infection Control Recommendations   · Ignacio Precautions  · Contact Isolation     Antimicrobial Stewardship Recommendations     · Discontinuation of therapy    Coordination of Outpatient Care:   · Estimated Length of IV antimicrobials: D/C 4-11-19  · Patient will need Midline Catheter Insertion:   · Patient will need PICC line Insertion:  · Patient will need: Home IV , [place],  SNF,  LTAC: TBD  · Patient will need outpatient wound care:TBD    Chief complaint/reason for consultation:   · Emergent CABG  · Protruding orthopedic nail from Rt knee      History of Present Illness:   Juanito Garcia is a 46y.o.-year-old  male who was initially admitted on 4/7/2019. Patient seen at the request of Emil Munoz. INITIAL HISTORY:     Patient apparently developed chest pain and called EMS.   Had cardiac arrest at the scene, was resuscitated and brought to ER where he had second cardiac arrest. Was resuscitated once again and taken to OR for emergent CABG x 4. He had another cardiac arrest in OR. Has survived the above and is back in the ICU. Found to have area of malodor on lateral aspect Rt knee, corresponding to a protruding orthopedic screw from a remote ORIF of the Rt  Tibial plateau. The screw has been removed and sent for culture. X rays show healed fracture of the proximal tibia. There is additional hardware in place that will likely need to be removed if patient survives. CURRENT EVALUATION : 4/11/2019    Fever yesterday morning of 101.4 (38.6), otherwise afebrile  VS stable . Off dobutamine. Still intubated. Sedation changed to versed from propofol due to cardiac output. Precedex running too. Plan is to attempt sedation wean sometime within the next 24 hours to assess for extubation. Cardiology increased his lasix to 40 IV BID for pulmonary edema. Had problems with NSVT during the night on 4-8-19. Suppressed with amiodarone     WBCs trended down to 10.2. CXR 4-10-19 showed congestion and effusions. Sternum closed on 4/8    Currently on Vancomycin and Unasyn. Will d/c both antibiotics. Looks better  Leg sinus tract clean  No culture data    Multiple lines. No inflammation    Cultures:  Wound:  · Orthopedic screw:No culture data    Discussed with RN. I have personally reviewed the past medical history, past surgical history, medications, social history, and family history, and I have updated the database accordingly.   Past Medical History:     Past Medical History:   Diagnosis Date    CAD (coronary artery disease) 04/07/2019    Cardiac arrest ([facility] 04/07/2019    Osteolysis 04/07/2019    R Knee hardware    PEA (Pulseless electrical activity) ([facility] 04/07/2019    PVD (peripheral vascular disease) ([facility] 04/07/2019       Past Surgical  History:     Past Surgical History:   Procedure Laterality Date    CARDIAC CATHETERIZATION  04/07/2019 anicteric; conjunctivae pink. No embolic phenomena. ENT: Oropharynx clear, without erythema, exudate, or thrush. No tenderness of sinuses. Mouth/throat: mucosa pink and moist. No lesions. Dentition in good repair. Neck:Supple, without lymphadenopathy. Thyroid normal, No bruits. Pulmonary/Chest: Coarse sounds  Cardiovascular: Regular rate and rhythm without murmurs, rubs, or gallops. Abdomen: Soft, non tender. Bowel sounds normal. No organomegaly  All four Extremities: No cyanosis, clubbing, edema, or effusions. Neurologic: Unable to assess. Sedated. Skin: Warm and dry with good turgor. Signs of peripheral arterial insufficiency. Rt lateral knee with open incision with old blood at site of prior protruding screw. Chest incision closed. Medical Decision Making -Laboratory:   I have independently reviewed/ordered the following labs:    CBC with Differential:   Recent Labs     04/10/19  0440 04/11/19  0456   WBC 14.9* 10.2   HGB 10.1* 10.1*   HCT 32.0* 32.2*   PLT See Reflexed IPF Result 100*     BMP:   Recent Labs     04/10/19  0440 04/11/19  0456    137   K 4.4 3.6*   * 108*   CO2 18* 20   BUN 22* 19   CREATININE 0.41* 0.43*   MG 2.0 2.1     Hepatic Function Panel:   No results for input(s): PROT, LABALBU, BILIDIR, IBILI, BILITOT, ALKPHOS, ALT, AST in the last 72 hours. No results for input(s): RPR in the last 72 hours. No results for input(s): HIV in the last 72 hours. No results for input(s): BC in the last 72 hours.   Lab Results   Component Value Date    RBC 3.37 04/11/2019    WBC 10.2 04/11/2019     Lab Results   Component Value Date    CREATININE 0.43 04/11/2019    GLUCOSE 207 04/11/2019       Medical Decision Making-Imaging:     EXAMINATION:   3 XRAY VIEWS OF THE RIGHT KNEE       4/7/2019 5:00 pm       COMPARISON:   04/07/2019       HISTORY:   ORDERING SYSTEM PROVIDED HISTORY: s/p Hardware removal at bedside   TECHNOLOGIST PROVIDED HISTORY:   s/p Hardware removal at bedside     FINDINGS:   Interval removal of anterior superior fixating screw.  No periprosthetic   fracture.  Chronic deformity of the proximal fibula.  Degenerative changes of   knee and tib-fib syndesmosis.         Impression   Removal of anterior-superior fixating screw without interval change otherwise     EXAMINATION:   3 XRAY VIEWS OF THE RIGHT KNEE       4/7/2019 3:28 pm       COMPARISON:   None.       HISTORY:   ORDERING SYSTEM PROVIDED HISTORY: Trauma/Fracture   TECHNOLOGIST PROVIDED HISTORY:   Trauma/Fracture       FINDINGS:   Sideplate and 4 screws transfix the proximal tibia laterally.  Old healed   fracture proximal fibular neck and head.  Spurring lateral tibia plateau. Proximal tibiofibular syndesmosis.  Visualized femur and patella normal.   Soft tissues unremarkable.           Impression   ORIF proximal tibia.  No acute fracture.           Medical Decision Making-Other: Thank you for allowing us to participate in the care of this patient. Please call with questions. Scarlett Pressley     ATTESTATION:    I have discussed the case, including pertinent history and exam findings with the residents. I have seen and examined the patient and the key elements of the encounter have been performed by me. I have reviewed the laboratory data, other diagnostic studies and discussed them with the residents. I have updated the medical record where necessary. I agree with the assessment, plan and orders as documented by the resident.     Jong Vann MD.    Pager: [phone] - Office: [phone]

## 2019-04-11 NOTE — PROGRESS NOTES
[facility]   Progress Note                   Date:   4/11/2019  Patient name: Jennifer Murray  Date of admission:  4/7/2019  4:48 AM  MRN:   [id number]  YOB: 1965  PCP: No primary care provider on file.     Reason for Admission:  stemi     Subjective:       POD#4( emergent CABG)    @ Pt is on 50% Fio2 and PEEP of 10 and still hypoxic on ABG  Off the dobutamine gtt and tolerated lopressor 25 BID  SR 70 to 80  Temp 99.5    Medications:   Scheduled Meds:   aspirin  81 mg Oral Daily    docusate  100 mg Oral Daily    dextrose  12.5 g Intravenous Once    albuterol  2.5 mg Nebulization Q6H    furosemide  20 mg Intravenous BID    ampicillin-sulbactam  3 g Intravenous Q6H    vancomycin (VANCOCIN) intermittent dosing (placeholder)   Other RX Placeholder    sodium chloride flush  10 mL Intravenous 2 times per day    sodium chloride flush  10 mL Intravenous 2 times per day    polyethylene glycol  17 g Oral Daily    famotidine  20 mg Oral BID    metoprolol tartrate  25 mg Oral BID    lisinopril  2.5 mg Oral Daily    amiodarone  200 mg Oral BID    chlorhexidine  15 mL Mouth/Throat BID    mupirocin   Nasal BID    therapeutic multivitamin-minerals  1 tablet Oral Daily with breakfast    atorvastatin  40 mg Oral Nightly    enoxaparin  40 mg Subcutaneous Daily    clopidogrel  75 mg Oral Daily    insulin lispro  0-12 Units Subcutaneous TID WC    insulin lispro  0-6 Units Subcutaneous Nightly       Continuous Infusions:   midazolam 4 mg/hr (04/11/19 9511)    dexmedetomidine (PRECEDEX) IV infusion 1.4 mcg/kg/hr (04/11/19 0541)    sodium chloride 75 mL/hr at 04/07/19 1145    dextrose      phenylephrine (GIANNA-SYNEPHRINE) 50mg/250mL infusion Stopped (04/08/19 1418)    norepinephrine Stopped (04/08/19 0230)       CBC:   Recent Labs     04/09/19  0441 04/10/19  0440 04/11/19  0456   WBC 12.5* 14.9* 10.2   HGB 8.9* 10.1* 10.1*   PLT See Reflexed IPF Result See Reflexed IPF Result 100* BMP:    Recent Labs     04/09/19  0441 04/10/19  0440 04/11/19  0456    139 137   K 4.2 4.4 3.6*   * 112* 108*   CO2 18* 18* 20   BUN 20 22* 19   CREATININE 0.86 0.41* 0.43*   GLUCOSE 114* 212* 207*     Hepatic:   No results for input(s): AST, ALT, ALB, BILITOT, ALKPHOS in the last 72 hours. Troponin: No results for input(s): TROPONINI in the last 72 hours. BNP: No results for input(s): BNP in the last 72 hours. Lipids: No results for input(s): CHOL, HDL in the last 72 hours. Invalid input(s): LDLCALCU  INR:   Recent Labs     04/09/19  0441 04/10/19  0440 04/11/19  0456   INR 2.0 1.7 1.4       Objective:   Vitals: BP (!) 149/70   Pulse 91   Temp 99.1 °F (37.3 °C) (Axillary)   Resp 20   Ht 5' 8\" (1.727 m)   Wt 179 lb 9 oz (81.4 kg)   SpO2 98%   BMI 27.30 kg/m²     General appearance: sedated and intubated  HEENT: Head: Normocephalic, no lesions, without obvious abnormality  Neck: no JVD  Lungs:  Mechanical ventilator  Heart: regular rate and rhythm, S1, S2 normal, no murmur, click, rub or gallop  Abdomen: soft, non-tender; bowel sounds normal  Extremities: No LE edema  Neurologic: SEDATED      EKG: Sinus tachycardia/ Anterior septal infarct      Echocardiogram:  pending    4/7/19  Coronary Angiography:     LMCA: has distal 60% stenosis. LAD: has ostial 90% stenosis and long proximal 80% stenosis. D1 has proximal 70% stenosis. LCx: has 20% stenosis. OM1 is very small. OM2 has proximal 90% stenosis. RCA: has proximal 50% stenosis, mid 60% stenosis and distal 80% stenosis. Ramus: has ostial 99% stenosis.     The LV gram was performed in the DUTTON 30 position. LVEF: 15%. LV Wall Motion: severe hypokinesis of the anterior wall and mid inferior wall with apical akinesis.          Assessment:   1 PEA Arrest( Multiple times) ROSC in few minutes S/p weaning off from paralytic  2 Late presentation of Anterior septal STEMI   3 Multivessel CAD s/p emergent CABG( 4/7) ( LIMA-LAD, SVG-PDA,OM 1 & distal circumflex) 4/7 S/P sternal closure on 4/8  4 Ischemic cardiomyopathy with acute systolic CHF( ef 05% ANGELA)  5 NSVT  6 Post op anemia  7 Post op thrombocytopenia  8 Acute pulmonary edema S/p intubation  9 Leukocytosis on Abx improving      Treatment Plan:   1  Urgent CABG( 4/7). Sternal closure ( 4/8). Lopressor 25 mg BID. Will do ECHO for the patient. ABG shows hypoxia and x ray consistent with pulmonary edema. +7 liter. Will increase the lasix to 40 IV BID  2  Post op care per CT Surgery. 3  Continue with Asa, plavix and lipitor. 4 Keep K>4, Mag>2 and Calcium in normal range. 5  Low grade temp. ID follow up. On Vancomycin for possible tibial prosthesis infection. unasyn for sinusitis Follow CBC & BMP. Discussed with patient and nursing. Karmen Coreas MD  Fellow cardiology    . Attending note,   The patient was seen and examined, agree with above, intubated and sedated, off pressors, continue supportive treatment, will follow.

## 2019-04-11 NOTE — PROGRESS NOTES
Occupational Therapy Not Seen Note    DATE: 2019  Name: Damon Frank  : 1965  MRN: [id number]    Patient not available for Occupational Therapy due to:    Sedation/ intubation, cx per RN Sakina Freire, pt not medically appropriate     Next Scheduled Treatment: Re-check 2019    Electronically signed by ISMAEL Mcqueen/L on 2019 at 12:09 PM

## 2019-04-11 NOTE — PROGRESS NOTES
Progress Note    Patient - Wale Muse  Date of Admission -  2019  4:48 AM  Date of Evaluation -  2019  Room and Bed Number -  1012/1012-01   Hospital Day - 4    CHIEF COMPLAINT :   Chief Complaint   Patient presents with    Cardiac Arrest     diff breather, brought in on cpap with elevations in EKG on EMS arrival.        SUBJECTIVE:     OVERNIGHT EVENTS:       Febrile 103 t max   fio2 and peep is being decreased   Rt ij removed   Left sc cordis     AWAKE & FOLLOWING COMMANDS: sedation holiday today     SECRETIONS Amount:  [x] Small [] Moderate  [] Large  [] None  Color:     [x] White [] Colored  [] Bloody    SEDATION:  RAAS Score: precedex  [] Propofol gtt  [x] Versed gtt  [] Ativan gtt   [] No Sedation    PARALYZED:  [x] No    [] Yes    VASOPRESSORS:  [x] No    [] Yes  [] Levophed [] Dopamine [] Vasopressin  [] Dobutamine [] Phenylephrine [] Epinephrine   Ros unable to perform due to intubation and sedation      OBJECTIVE:     VITAL SIGNS:  BP (!) 111/47   Pulse 87   Temp 103.1 °F (39.5 °C) (Oral)   Resp 18   Ht 5' 8\" (1.727 m)   Wt 179 lb 9 oz (81.4 kg)   SpO2 94%   BMI 27.30 kg/m²   Tmax over 24 hours:  Temp (24hrs), Av °F (37.8 °C), Min:99 °F (37.2 °C), Max:103.1 °F (39.5 °C)      Patient Vitals for the past 8 hrs:   BP Temp Temp src Pulse Resp SpO2   19 1400 (!) 111/47 -- -- 87 -- 94 %   19 1347 -- -- -- -- -- 98 %   19 1300 (!) 109/44 -- -- 83 -- 95 %   19 1230 -- -- -- 83 -- 95 %   19 1200 (!) 109/43 103.1 °F (39.5 °C) Oral 83 18 95 %   19 1130 -- -- -- 83 -- 97 %   19 1105 -- -- -- -- 20 --   19 1100 (!) 105/49 -- -- 83 -- 97 %   19 1030 -- -- -- 88 -- --   19 1015 -- -- -- -- -- 97 %   19 1000 (!) 118/57 -- -- 86 -- 96 %   19 0950 -- -- -- 86 -- 98 %   19 0945 -- -- -- 87 -- 96 %   19 0930 -- -- -- 89 -- 96 %   19 0915 -- -- -- 89 -- 96 %   19 0900 (!) 127/58 -- -- 90 -- 96 %   19 0.97 04/09/2019    CAION 1.01 04/07/2019        Urinalysis: No results found for: [place], PHUR, LABCAST, WBCUA, RBCUA, MUCUS, TRICHOMONAS, YEAST, BACTERIA, CLARITYU, SPECGRAV, LEUKOCYTESUR, UROBILINOGEN, BILIRUBINUR, BLOODU, GLUCOSEU, KETUA, AMORPHOUS    HgBA1c:    Lab Results   Component Value Date    LABA1C 10.8 04/07/2019     Xray   Pulmonary edema and pleural effusion     ASSESSMENT:     Patient Active Problem List    Diagnosis Date Noted    Cardiac arrest ([facility] 04/07/2019    Severe left ventricular systolic dysfunction 34/42/1585    [facility] with [facility] 04/07/2019    Cardiogenic pulmonary edema ([facility] 04/07/2019    PAD (peripheral artery disease) ([facility] 04/07/2019    Acute ST elevation myocardial infarction (STEMI) of anterior wall ([facility]     Subacute osteomyelitis of right tibia ([facility]     Exposed orthopaedic hardware ([facility]          PLAN:     WEAN PER PROTOCOL:  [] No   [x] Yes  [] N/A    DISCONTINUE ANY LABS:   [x] No   [] Yes    ICU PROPHYLAXIS:  Stress ulcer:  [] PPI Agent  [x] [id number] [] Sucralfate  [] Other:  VTE:   [x] Enoxaparin  [] Unfract. Heparin Subcut  [] EPC Cuffs    NUTRITION:  [] NPO [x] start Tube Feeding  [] TPN  [] PO        INSULIN DRIP:   [x] No   [] Yes    CONSULTATION NEEDED:  [x] No   [] Yes    FAMILY UPDATED:    [] No   [] Yes    TRANSFER OUT OF ICU:   [x] No   [] Yes    ADDITIONAL PLAN:  - d/w rt continue to wean peep and fio2   Blood cultures x2   ? Hardware removal     -  -      Deuce Ni MD  4/11/2019 3:31 PM    Total critical care time caring for this patient with life threatening, unstable organ failure, including direct patient contact, management of life support systems, review of data including imaging and labs, discussions with other team members and physicians at least 28  Min so far today, excluding procedures.

## 2019-04-12 ENCOUNTER — APPOINTMENT (OUTPATIENT)
Dept: CT IMAGING | Age: 54
DRG: 165 | End: 2019-04-12
Payer: MEDICAID

## 2019-04-12 ENCOUNTER — APPOINTMENT (OUTPATIENT)
Dept: ULTRASOUND IMAGING | Age: 54
DRG: 165 | End: 2019-04-12
Payer: MEDICAID

## 2019-04-12 LAB
ALBUMIN SERPL-MCNC: 2.7 G/DL (ref 3.5–5.2)
ALBUMIN/GLOBULIN RATIO: 1.3 (ref 1–2.5)
ALLEN TEST: ABNORMAL
ALP BLD-CCNC: 71 U/L (ref 40–129)
ALT SERPL-CCNC: 3818 U/L (ref 5–41)
AMYLASE: 78 U/L (ref 28–100)
ANION GAP SERPL CALCULATED.3IONS-SCNC: 11 MMOL/L (ref 9–17)
AST SERPL-CCNC: 3077 U/L
BILIRUB SERPL-MCNC: 1.9 MG/DL (ref 0.3–1.2)
BILIRUBIN DIRECT: 1.3 MG/DL
BILIRUBIN, INDIRECT: 0.6 MG/DL (ref 0–1)
BUN BLDV-MCNC: 43 MG/DL (ref 6–20)
BUN/CREAT BLD: ABNORMAL (ref 9–20)
CALCIUM SERPL-MCNC: 7.1 MG/DL (ref 8.6–10.4)
CHLORIDE BLD-SCNC: 113 MMOL/L (ref 98–107)
CO2: 18 MMOL/L (ref 20–31)
COMPLEMENT C3: 45 MG/DL (ref 90–180)
COMPLEMENT C4: 6 MG/DL (ref 10–40)
CREAT SERPL-MCNC: 1.99 MG/DL (ref 0.7–1.2)
FIO2: 50
FREE KAPPA/LAMBDA RATIO: 1.62 (ref 0.26–1.65)
GFR AFRICAN AMERICAN: 43 ML/MIN
GFR NON-AFRICAN AMERICAN: 35 ML/MIN
GFR SERPL CREATININE-BSD FRML MDRD: ABNORMAL ML/MIN/{1.73_M2}
GFR SERPL CREATININE-BSD FRML MDRD: ABNORMAL ML/MIN/{1.73_M2}
GLOBULIN: ABNORMAL G/DL (ref 1.5–3.8)
GLUCOSE BLD-MCNC: 112 MG/DL (ref 75–110)
GLUCOSE BLD-MCNC: 127 MG/DL (ref 75–110)
GLUCOSE BLD-MCNC: 133 MG/DL (ref 75–110)
GLUCOSE BLD-MCNC: 138 MG/DL (ref 75–110)
GLUCOSE BLD-MCNC: 146 MG/DL (ref 74–100)
GLUCOSE BLD-MCNC: 148 MG/DL (ref 70–99)
GLUCOSE BLD-MCNC: 175 MG/DL (ref 75–110)
HCT VFR BLD CALC: 20.7 % (ref 40.7–50.3)
HCT VFR BLD CALC: 27.4 % (ref 40.7–50.3)
HEMOGLOBIN: 6.6 G/DL (ref 13–17)
HEMOGLOBIN: 8.7 G/DL (ref 13–17)
INR BLD: 1.4
KAPPA FREE LIGHT CHAINS QNT: 9.43 MG/DL (ref 0.37–1.94)
LAMBDA FREE LIGHT CHAINS QNT: 5.81 MG/DL (ref 0.57–2.63)
LIPASE: 40 U/L (ref 13–60)
LV EF: 40 %
LVEF MODALITY: NORMAL
MAGNESIUM: 2.2 MG/DL (ref 1.6–2.6)
MCH RBC QN AUTO: 31.1 PG (ref 25.2–33.5)
MCHC RBC AUTO-ENTMCNC: 31.9 G/DL (ref 28.4–34.8)
MCV RBC AUTO: 97.6 FL (ref 82.6–102.9)
MODE: ABNORMAL
NEGATIVE BASE EXCESS, ART: 4 (ref 0–2)
NRBC AUTOMATED: 0.8 PER 100 WBC
O2 DEVICE/FLOW/%: ABNORMAL
PATIENT TEMP: 38.5
PDW BLD-RTO: 14.3 % (ref 11.8–14.4)
PLATELET # BLD: 122 K/UL (ref 138–453)
PMV BLD AUTO: 12.4 FL (ref 8.1–13.5)
POC HCO3: 21 MMOL/L (ref 21–28)
POC LACTIC ACID: 1.19 MMOL/L (ref 0.56–1.39)
POC O2 SATURATION: 94 % (ref 94–98)
POC PCO2 TEMP: 39 MM HG
POC PCO2: 36.2 MM HG (ref 35–48)
POC PH TEMP: 7.35
POC PH: 7.37 (ref 7.35–7.45)
POC PO2 TEMP: 79 MM HG
POC PO2: 71.7 MM HG (ref 83–108)
POSITIVE BASE EXCESS, ART: ABNORMAL (ref 0–3)
POTASSIUM SERPL-SCNC: 4.6 MMOL/L (ref 3.7–5.3)
PROTHROMBIN TIME: 14.7 SEC (ref 9–12)
RBC # BLD: 2.12 M/UL (ref 4.21–5.77)
SAMPLE SITE: ABNORMAL
SODIUM BLD-SCNC: 142 MMOL/L (ref 135–144)
TCO2 (CALC), ART: 22 MMOL/L (ref 22–29)
TOTAL PROTEIN: 4.8 G/DL (ref 6.4–8.3)
VANCOMYCIN TROUGH DATE LAST DOSE: ABNORMAL
VANCOMYCIN TROUGH DOSE AMOUNT: ABNORMAL
VANCOMYCIN TROUGH TIME LAST DOSE: ABNORMAL
VANCOMYCIN TROUGH: 22.2 UG/ML (ref 10–20)
WBC # BLD: 12.4 K/UL (ref 3.5–11.3)

## 2019-04-12 PROCEDURE — 82947 ASSAY GLUCOSE BLOOD QUANT: CPT

## 2019-04-12 PROCEDURE — 6370000000 HC RX 637 (ALT 250 FOR IP): Performed by: PHYSICIAN ASSISTANT

## 2019-04-12 PROCEDURE — 36415 COLL VENOUS BLD VENIPUNCTURE: CPT

## 2019-04-12 PROCEDURE — 94640 AIRWAY INHALATION TREATMENT: CPT

## 2019-04-12 PROCEDURE — 93306 TTE W/DOPPLER COMPLETE: CPT

## 2019-04-12 PROCEDURE — 94770 HC ETCO2 MONITOR DAILY: CPT

## 2019-04-12 PROCEDURE — 84155 ASSAY OF PROTEIN SERUM: CPT

## 2019-04-12 PROCEDURE — 86160 COMPLEMENT ANTIGEN: CPT

## 2019-04-12 PROCEDURE — 6360000002 HC RX W HCPCS: Performed by: THORACIC SURGERY (CARDIOTHORACIC VASCULAR SURGERY)

## 2019-04-12 PROCEDURE — 86334 IMMUNOFIX E-PHORESIS SERUM: CPT

## 2019-04-12 PROCEDURE — 6370000000 HC RX 637 (ALT 250 FOR IP): Performed by: NURSE PRACTITIONER

## 2019-04-12 PROCEDURE — 83735 ASSAY OF MAGNESIUM: CPT

## 2019-04-12 PROCEDURE — 2100000001 HC CVICU R&B

## 2019-04-12 PROCEDURE — 82803 BLOOD GASES ANY COMBINATION: CPT

## 2019-04-12 PROCEDURE — 84300 ASSAY OF URINE SODIUM: CPT

## 2019-04-12 PROCEDURE — 2580000003 HC RX 258: Performed by: INTERNAL MEDICINE

## 2019-04-12 PROCEDURE — 85018 HEMOGLOBIN: CPT

## 2019-04-12 PROCEDURE — 85027 COMPLETE CBC AUTOMATED: CPT

## 2019-04-12 PROCEDURE — 84165 PROTEIN E-PHORESIS SERUM: CPT

## 2019-04-12 PROCEDURE — 81001 URINALYSIS AUTO W/SCOPE: CPT

## 2019-04-12 PROCEDURE — 94003 VENT MGMT INPAT SUBQ DAY: CPT

## 2019-04-12 PROCEDURE — 86850 RBC ANTIBODY SCREEN: CPT

## 2019-04-12 PROCEDURE — 95822 EEG COMA OR SLEEP ONLY: CPT | Performed by: PSYCHIATRY & NEUROLOGY

## 2019-04-12 PROCEDURE — 86901 BLOOD TYPING SEROLOGIC RH(D): CPT

## 2019-04-12 PROCEDURE — 76770 US EXAM ABDO BACK WALL COMP: CPT

## 2019-04-12 PROCEDURE — 6360000002 HC RX W HCPCS: Performed by: NURSE PRACTITIONER

## 2019-04-12 PROCEDURE — 82150 ASSAY OF AMYLASE: CPT

## 2019-04-12 PROCEDURE — 97530 THERAPEUTIC ACTIVITIES: CPT

## 2019-04-12 PROCEDURE — 87086 URINE CULTURE/COLONY COUNT: CPT

## 2019-04-12 PROCEDURE — 95816 EEG AWAKE AND DROWSY: CPT

## 2019-04-12 PROCEDURE — 6360000002 HC RX W HCPCS: Performed by: INTERNAL MEDICINE

## 2019-04-12 PROCEDURE — 87205 SMEAR GRAM STAIN: CPT

## 2019-04-12 PROCEDURE — 97161 PT EVAL LOW COMPLEX 20 MIN: CPT

## 2019-04-12 PROCEDURE — 85014 HEMATOCRIT: CPT

## 2019-04-12 PROCEDURE — 80048 BASIC METABOLIC PNL TOTAL CA: CPT

## 2019-04-12 PROCEDURE — 85610 PROTHROMBIN TIME: CPT

## 2019-04-12 PROCEDURE — 89220 SPUTUM SPECIMEN COLLECTION: CPT

## 2019-04-12 PROCEDURE — 87070 CULTURE OTHR SPECIMN AEROBIC: CPT

## 2019-04-12 PROCEDURE — 83690 ASSAY OF LIPASE: CPT

## 2019-04-12 PROCEDURE — 86900 BLOOD TYPING SEROLOGIC ABO: CPT

## 2019-04-12 PROCEDURE — 83883 ASSAY NEPHELOMETRY NOT SPEC: CPT

## 2019-04-12 PROCEDURE — 99024 POSTOP FOLLOW-UP VISIT: CPT | Performed by: NURSE PRACTITIONER

## 2019-04-12 PROCEDURE — 99254 IP/OBS CNSLTJ NEW/EST MOD 60: CPT | Performed by: NURSE PRACTITIONER

## 2019-04-12 PROCEDURE — 80202 ASSAY OF VANCOMYCIN: CPT

## 2019-04-12 PROCEDURE — 83605 ASSAY OF LACTIC ACID: CPT

## 2019-04-12 PROCEDURE — 2580000003 HC RX 258: Performed by: NURSE PRACTITIONER

## 2019-04-12 PROCEDURE — 80076 HEPATIC FUNCTION PANEL: CPT

## 2019-04-12 PROCEDURE — 99291 CRITICAL CARE FIRST HOUR: CPT | Performed by: INTERNAL MEDICINE

## 2019-04-12 PROCEDURE — 2700000000 HC OXYGEN THERAPY PER DAY

## 2019-04-12 PROCEDURE — 99233 SBSQ HOSP IP/OBS HIGH 50: CPT | Performed by: INTERNAL MEDICINE

## 2019-04-12 PROCEDURE — 70450 CT HEAD/BRAIN W/O DYE: CPT

## 2019-04-12 PROCEDURE — 82570 ASSAY OF URINE CREATININE: CPT

## 2019-04-12 PROCEDURE — 51702 INSERT TEMP BLADDER CATH: CPT

## 2019-04-12 PROCEDURE — 86038 ANTINUCLEAR ANTIBODIES: CPT

## 2019-04-12 PROCEDURE — 94762 N-INVAS EAR/PLS OXIMTRY CONT: CPT

## 2019-04-12 PROCEDURE — 86920 COMPATIBILITY TEST SPIN: CPT

## 2019-04-12 RX ORDER — FUROSEMIDE 10 MG/ML
80 INJECTION INTRAMUSCULAR; INTRAVENOUS ONCE
Status: COMPLETED | OUTPATIENT
Start: 2019-04-12 | End: 2019-04-12

## 2019-04-12 RX ORDER — METOCLOPRAMIDE HYDROCHLORIDE 5 MG/ML
5 INJECTION INTRAMUSCULAR; INTRAVENOUS EVERY 6 HOURS
Status: COMPLETED | OUTPATIENT
Start: 2019-04-12 | End: 2019-04-13

## 2019-04-12 RX ORDER — FAMOTIDINE 20 MG/1
20 TABLET, FILM COATED ORAL DAILY
Status: DISCONTINUED | OUTPATIENT
Start: 2019-04-13 | End: 2019-04-14

## 2019-04-12 RX ORDER — FUROSEMIDE 10 MG/ML
20 INJECTION INTRAMUSCULAR; INTRAVENOUS ONCE
Status: COMPLETED | OUTPATIENT
Start: 2019-04-12 | End: 2019-04-12

## 2019-04-12 RX ORDER — HEPARIN SODIUM 5000 [USP'U]/ML
5000 INJECTION, SOLUTION INTRAVENOUS; SUBCUTANEOUS EVERY 8 HOURS SCHEDULED
Status: DISCONTINUED | OUTPATIENT
Start: 2019-04-12 | End: 2019-04-14

## 2019-04-12 RX ADMIN — VANCOMYCIN HYDROCHLORIDE 1000 MG: 1 INJECTION, SOLUTION INTRAVENOUS at 03:40

## 2019-04-12 RX ADMIN — Medication 15 ML: at 00:17

## 2019-04-12 RX ADMIN — Medication 10 ML: at 20:57

## 2019-04-12 RX ADMIN — METOCLOPRAMIDE 5 MG: 5 INJECTION, SOLUTION INTRAMUSCULAR; INTRAVENOUS at 12:33

## 2019-04-12 RX ADMIN — FUROSEMIDE 80 MG: 10 INJECTION, SOLUTION INTRAMUSCULAR; INTRAVENOUS at 13:29

## 2019-04-12 RX ADMIN — Medication 10 ML: at 08:16

## 2019-04-12 RX ADMIN — DESMOPRESSIN ACETATE 40 MG: 0.2 TABLET ORAL at 21:04

## 2019-04-12 RX ADMIN — AMIODARONE HYDROCHLORIDE 200 MG: 200 TABLET ORAL at 08:15

## 2019-04-12 RX ADMIN — ALBUTEROL SULFATE 2.5 MG: 2.5 SOLUTION RESPIRATORY (INHALATION) at 07:30

## 2019-04-12 RX ADMIN — ALBUTEROL SULFATE 2.5 MG: 2.5 SOLUTION RESPIRATORY (INHALATION) at 13:36

## 2019-04-12 RX ADMIN — INSULIN LISPRO 1 UNITS: 100 INJECTION, SOLUTION INTRAVENOUS; SUBCUTANEOUS at 22:03

## 2019-04-12 RX ADMIN — Medication 10 ML: at 08:20

## 2019-04-12 RX ADMIN — ALBUTEROL SULFATE 2.5 MG: 2.5 SOLUTION RESPIRATORY (INHALATION) at 19:52

## 2019-04-12 RX ADMIN — POLYETHYLENE GLYCOL 3350 17 G: 17 POWDER, FOR SOLUTION ORAL at 08:15

## 2019-04-12 RX ADMIN — ENOXAPARIN SODIUM 40 MG: 40 INJECTION SUBCUTANEOUS at 08:15

## 2019-04-12 RX ADMIN — METOCLOPRAMIDE 5 MG: 5 INJECTION, SOLUTION INTRAMUSCULAR; INTRAVENOUS at 16:51

## 2019-04-12 RX ADMIN — METOCLOPRAMIDE 5 MG: 5 INJECTION, SOLUTION INTRAMUSCULAR; INTRAVENOUS at 22:12

## 2019-04-12 RX ADMIN — Medication 100 MG: at 08:17

## 2019-04-12 RX ADMIN — CEFTAROLINE FOSAMIL 400 MG: 400 POWDER, FOR SOLUTION INTRAVENOUS at 23:39

## 2019-04-12 RX ADMIN — ACETAMINOPHEN 650 MG: 325 TABLET ORAL at 08:48

## 2019-04-12 RX ADMIN — ACETAMINOPHEN 650 MG: 325 TABLET ORAL at 12:48

## 2019-04-12 RX ADMIN — FUROSEMIDE 20 MG: 10 INJECTION, SOLUTION INTRAMUSCULAR; INTRAVENOUS at 02:20

## 2019-04-12 RX ADMIN — METOPROLOL TARTRATE 25 MG: 25 TABLET ORAL at 21:04

## 2019-04-12 RX ADMIN — CLOPIDOGREL 75 MG: 75 TABLET, FILM COATED ORAL at 08:15

## 2019-04-12 RX ADMIN — Medication 500000 UNITS: at 16:51

## 2019-04-12 RX ADMIN — Medication 15 ML: at 08:28

## 2019-04-12 RX ADMIN — ALBUTEROL SULFATE 2.5 MG: 2.5 SOLUTION RESPIRATORY (INHALATION) at 03:03

## 2019-04-12 RX ADMIN — HEPARIN SODIUM 5000 UNITS: 5000 INJECTION INTRAVENOUS; SUBCUTANEOUS at 22:12

## 2019-04-12 RX ADMIN — ACETAMINOPHEN 650 MG: 325 TABLET ORAL at 04:44

## 2019-04-12 RX ADMIN — FAMOTIDINE 20 MG: 20 TABLET, FILM COATED ORAL at 08:16

## 2019-04-12 RX ADMIN — Medication 15 ML: at 21:10

## 2019-04-12 RX ADMIN — Medication 500000 UNITS: at 21:09

## 2019-04-12 RX ADMIN — METOPROLOL TARTRATE 25 MG: 25 TABLET ORAL at 08:16

## 2019-04-12 RX ADMIN — Medication 500000 UNITS: at 12:33

## 2019-04-12 RX ADMIN — ACETAMINOPHEN 650 MG: 325 TABLET ORAL at 16:54

## 2019-04-12 RX ADMIN — AMIODARONE HYDROCHLORIDE 200 MG: 200 TABLET ORAL at 21:04

## 2019-04-12 RX ADMIN — ASPIRIN 81 MG 81 MG: 81 TABLET ORAL at 08:16

## 2019-04-12 ASSESSMENT — PAIN SCALES - GENERAL: PAINLEVEL_OUTOF10: 0

## 2019-04-12 ASSESSMENT — PULMONARY FUNCTION TESTS
PIF_VALUE: 29
PIF_VALUE: 28
PIF_VALUE: 30
PIF_VALUE: 30
PIF_VALUE: 27
PIF_VALUE: 29
PIF_VALUE: 28
PIF_VALUE: 29

## 2019-04-12 NOTE — PROGRESS NOTES
[facility]  Daily Progress Note  Surgeon:  Dr. Abdullahi Markham     POD# 5   S/P :  Emerg Coronary artery bypass 4/7/19  Chest closure 4/8/19  EF: 45 %          Subjective:  Mr. Fransisco Mckeon is intubated. Sedation has been off >24 hours, no increase in movement, not responding to commands. Does have gag, opens eyes spontaneously. Fever 103 yesterday. Physical Exam  Vital Signs: BP (!) 127/53   Pulse 84   Temp 100.6 °F (38.1 °C) (Core)   Resp 16   Ht 5' 8\" (1.727 m)   Wt 179 lb 9 oz (81.4 kg)   SpO2 96%   BMI 27.30 kg/m²      Admit Weight: Weight: 176 lb 5.9 oz (80 kg)   WEIGHTWeight: 179 lb 9 oz (81.4 kg)     General: intubated, not responding to commands, No apparent distress. Heart:Normal S1 and S2.  Regular rhythm. No murmurs, gallops, or rubs. Pacing Wires Yes   Lungs: clear to auscultation bilaterally and diminished breath sounds bibasilar  Abdomen: soft, non tender, non distended, BSx4  Extremities: Trace edema  Wounds: clean and dry, healing appropriately. Pravena to Left thigh.      Scheduled Meds:    nystatin  5 mL Oral 4x Daily    aspirin  81 mg Oral Daily    docusate  100 mg Oral Daily    [Held by provider] furosemide  40 mg Intravenous BID    vancomycin (VANCOCIN) intermittent dosing (placeholder)   Other RX Placeholder    vancomycin  1,000 mg Intravenous Q12H    dextrose  12.5 g Intravenous Once    albuterol  2.5 mg Nebulization Q6H    sodium chloride flush  10 mL Intravenous 2 times per day    sodium chloride flush  10 mL Intravenous 2 times per day    polyethylene glycol  17 g Oral Daily    famotidine  20 mg Oral BID    metoprolol tartrate  25 mg Oral BID    amiodarone  200 mg Oral BID    chlorhexidine  15 mL Mouth/Throat BID    therapeutic multivitamin-minerals  1 tablet Oral Daily with breakfast    atorvastatin  40 mg Oral Nightly    enoxaparin  40 mg Subcutaneous Daily    clopidogrel  75 mg Oral Daily    insulin lispro  0-12 Units Subcutaneous TID WC  insulin lispro  0-6 Units Subcutaneous Nightly     Continuous Infusions:    midazolam Stopped (04/11/19 0800)    sodium chloride 75 mL/hr at 04/07/19 1145    dextrose      phenylephrine (GIANNA-SYNEPHRINE) 50mg/250mL infusion Stopped (04/08/19 1418)    norepinephrine Stopped (04/08/19 0230)       Data:  CBC:   Recent Labs     04/10/19  0440 04/11/19  0456 04/12/19  0534 04/12/19  0729   WBC 14.9* 10.2 12.4*  --    HGB 10.1* 10.1* 6.6* 8.7*   HCT 32.0* 32.2* 20.7* 27.4*   MCV 95.8 95.5 97.6  --    PLT See Reflexed IPF Result 100* 122*  --      BMP:   Recent Labs     04/10/19  0440 04/11/19  0456 04/12/19  0534    137 142   K 4.4 3.6* 4.6   * 108* 113*   CO2 18* 20 18*   BUN 22* 19 43*   CREATININE 0.41* 0.43* 1.99*     PT/INR:   Recent Labs     04/10/19  0440 04/11/19  0456 04/12/19  0534   PROTIME 17.4* 14.1* 14.7*   INR 1.7 1.4 1.4     APTT: No results for input(s): APTT in the last 72 hours. Chest X-Ray:  4/10/19: There is redemonstration of bilateral pulmonary congestion/edema that is   similar compared to prior. [place] are bibasilar effusions. [place] is no   pneumothorax.  The heart is prominent.  The upper abdomen is unremarkable. The extrathoracic soft tissues are unremarkable. [place] is an endotracheal   tube with the tip in the midtrachea.  There is a gastric tube with the tip in   the proximal aspect of the stomach.  The right internal jugular line has been   removed.  The [place]-Carly catheter has been removed.           Impression   Cardiomegaly and bilateral pulmonary congestion/edema that is similar   compared to prior.       Bibasilar effusions and stable endotracheal tube and gastric tube.                I/O:  I/O last 3 completed shifts:   In: 2216 [I.V.:2216]  Out: 790 [Urine:790]      Assessment & Plan:   Patient Active Problem List   Diagnosis    Cardiac arrest ([facility]    Acute ST elevation myocardial infarction (STEMI) of anterior wall (HCC)    Severe left ventricular systolic dysfunction    Acute respiratory failure with hypoxemia (HCC)    Cardiogenic pulmonary edema (HCC)    PAD (peripheral artery disease) [facility]    Subacute osteomyelitis of right tibia ([facility]    Exposed orthopaedic hardware ([facility] [address]     1. S/p Emergent CABG   POD 5  2. HD - stable, off pressors. Fever 103. 1F yesterday, Down to 100.1 today. ID following, will check to see if they would like to have ortho see patient again  3. Pulmo - sedated, intubated, keep intubated   Lasix 20 bid, not awakening since sedation off, not attempted weaning yet  4. GI - +BS, insulin gtt  TF started, residual >500ml overnight and it is on hold currently  5.  - good UOP  6. Neuro - sedation off x 24 hours, minimal cues that he is waking up. Neuro consult, CT head without contrast today  7. ABLA - stable  PAD - Vascular on board  S/p removal of prosthetic screw from rt proximal tibia - waiting for culture  ID on board - Vanco  Cre increased to 1.9 today, urine output dwindling overnight despite albumin and lasix given. Nephrology consult for ALMITA     DVT ppx: Lovenox & SCD's while stationary        The above recommendations including medications and orders were discussed and agreed upon with Dr. Marti Bear, the attending on service for the cardiothoracic surgery group today.      Puja Sunshine, APRN, CNP

## 2019-04-12 NOTE — PLAN OF CARE
Problem: Falls - Risk of:  Goal: Will remain free from falls  Description  Will remain free from falls  4/12/2019 0859 by Kassandra Madden RN  Outcome: Ongoing  4/12/2019 0143 by Nate Solano RN  Outcome: Ongoing     Problem: Falls - Risk of:  Goal: Absence of physical injury  Description  Absence of physical injury  Outcome: Ongoing     Problem: Risk for Impaired Skin Integrity  Goal: Tissue integrity - skin and mucous membranes  Description  Structural intactness and normal physiological function of skin and  mucous membranes.   4/12/2019 0859 by Kassandra Madden RN  Outcome: Ongoing  4/12/2019 0143 by Nate Solano RN  Outcome: Ongoing     Problem: Restraint Use - Nonviolent/Non-Self-Destructive Behavior:  Goal: Absence of restraint indications  Description  Absence of restraint indications  4/12/2019 0143 by Nate Solano RN  Outcome: Ongoing     Problem: SKIN INTEGRITY  Goal: Skin integrity is maintained or improved  4/12/2019 0859 by Kassandra Madden RN  Outcome: Ongoing  4/12/2019 0751 by Malena Hartley RCP  Outcome: Ongoing  4/12/2019 0143 by Nate Solano RN  Outcome: Ongoing  4/11/2019 1946 by Larisa Read RCP  Outcome: Ongoing

## 2019-04-12 NOTE — PROGRESS NOTES
04/12/19 0730   Cough/Sputum   Sputum How Obtained Endotracheal   Cough Strong   Sputum Amount Moderate   Sputum Color Yellow   Tenacity Thick     Sputum sample sent to the lab for culture.

## 2019-04-12 NOTE — PLAN OF CARE
BRONCHOSPASM/BRONCHOCONSTRICTION   [x]  IMPROVE AERATION/BREATH SOUNDS  [x]   ADMINISTER BRONCHODILATOR THERAPY AS APPROPRIATE  [x]   ASSESS BREATH SOUNDS  [x]   IMPLEMENT AEROSOL/MDI PROTOCOL  [x]   PATIENT EDUCATION AS NEEDED    Problem: OXYGENATION/RESPIRATORY FUNCTION  Goal: Patient will maintain patent airway  Outcome: Ongoing  Goal: Patient will achieve/maintain normal respiratory rate/effort  Respiratory rate and effort will be within normal limits for the patient    Outcome: Ongoing     Problem: MECHANICAL VENTILATION  Goal: Oral health is maintained or improved  Outcome: Ongoing  Goal: ET tube will be managed safely  Outcome: Ongoing  Goal: Ability to express needs and understand communication  Outcome: Ongoing  Goal: Mobility/activity is maintained at optimum level for patient  Outcome: Ongoing  Goal: Patient will maintain patent airway  Outcome: Ongoing     Problem: ASPIRATION PRECAUTIONS  Goal: Patients risk of aspiration is minimized  Outcome: Ongoing     Problem: SKIN INTEGRITY  Goal: Skin integrity is maintained or improved  Outcome: Ongoing

## 2019-04-12 NOTE — PROGRESS NOTES
Physical Therapy    Facility/Department: [facility] CAR 1  Initial Assessment    NAME: Wale Muse  : 1965  MRN: [id number]    Date of Service: 2019  Cardiac arrest x3 (on field, in ER and in OR)  S/P emergent CABG 4 on 19  S/P delayed closure of chest incision on 19 and closure on 2019    Discharge Recommendations:    Further therapy recommended at discharge. PT Equipment Recommendations  Equipment Needed: (TBD)    Assessment   Assessment: PROM x4 performed this date. Continue to assess and perform mobility as appropriate. Foot drop splint placed on R foot, alternate feet every 2 hours. RN notified of this  Prognosis: Good  Decision Making: Low Complexity  Patient Education: PT POC  REQUIRES PT FOLLOW UP: Yes  Activity Tolerance  Activity Tolerance: Treatment limited secondary to medical complications (free text)       Patient Diagnosis(es): The primary encounter diagnosis was Acute ST elevation myocardial infarction (STEMI) of anterior wall ([facility] A diagnosis of Cardiac arrest [facility]) was also pertinent to this visit. has a past medical history of CAD (coronary artery disease), Cardiac arrest ([address] Osteolysis, PEA (Pulseless electrical activity) ([facility] [address] and PVD (peripheral vascular disease) ([address] has a past surgical history that includes Cardiac catheterization (2019); Femur Surgery; Tonsillectomy; Coronary artery bypass graft (2019); Hardware Removal (Right, 2019); Tibia fracture surgery (Right); Coronary artery bypass graft (N/A, 2019); and RECONSTRUCTIVE REPAIR STERNAL (N/A, 2019).     Restrictions  Restrictions/Precautions  Restrictions/Precautions: Cardiac, General Precautions, Surgical Protocols, Strict Bedrest  Required Braces or Orthoses?: No  Position Activity Restriction  Sternal Precautions: No Pushing, No Pulling, 5# Lifting Restrictions  Vision/Hearing        Subjective  General  Patient assessed for rehabilitation services?: Yes  Response To Previous Treatment: Not applicable  Family / Caregiver Present: No  Follows Commands: Impaired(intubated and sedated)  Subjective  Subjective: RN was in the room upon arrival and pt is intubated. Pt was able to open eyes on command but closed them shortly after. Pt vitals stayed [facility] throughout  Pain Screening  Patient Currently in Pain: (unable to report,no facial grimicing noted.)  Vital Signs  Patient Currently in Pain: (unable to report,no facial grimicing noted.)       Orientation  Orientation  Overall Orientation Status: Impaired(able to respond to open eyes on command)  Social/Functional History  Social/Functional History  Lives With: Other (comment)([employer])  ADL Assistance: Independent  Homemaking Assistance: Independent  Homemaking Responsibilities: Yes  Ambulation Assistance: Independent  Transfer Assistance: Independent  Active : No  Additional Comments: unable to obtain social information at this time d/t intubation and no family present. Cognition        Objective          PROM RLE (degrees)  RLE PROM: WFL  RLE General PROM: Ankle DF to neutral  PROM LLE (degrees)  LLE PROM: WFL  PROM RUE (degrees)  RUE PROM: WFL  RUE General PROM: tighness noted at endrange shoulder flexion  PROM LUE (degrees)  [facility]                          Exercises  Comments: PROM of x4 extremities all planes x10 reps.  Gastroc stretch 30s x2 bilaterally     Plan   Plan  Times per week: 2-3 x per week  Times per day: Daily  Current Treatment Recommendations: ROM, Strengthening, Functional Mobility Training, Endurance Training, Transfer Training, Positioning  Safety Devices  Type of devices: Nurse notified, Left in bed  Restraints  Initially in place: Yes  Restraints: B hand restraints      AM-PAC Score  AM-PAC Inpatient Mobility Raw Score : 6  AM-PAC Inpatient T-Scale Score : 23.55  Mobility Inpatient CMS 0-100% Score: 100  Mobility Inpatient CMS G-Code Modifier : CN          Goals  Short term goals  Time Frame for Short term goals: 14 visits  Short term goal 1: prevent contractures x 4  Short term goal 2: facilitate active movement as able  Short term goal 3: mobilize as medically appropriate       Therapy Time   Individual Concurrent Group Co-treatment   Time In 5421         Time Out 1142         Minutes Shai Thorpe 117 Physical Therapist  This treatment/evaluation completed by signing SPT. Signing PT agrees with treatment and documentation.   May Alexander, PT

## 2019-04-12 NOTE — CONSULTS
Consults   Nephrology Consult Note    Reason for Consult: ALMITA   Requesting Physician:  Dr Max Reasons    Chief Complaint: cardiac arrest     History Obtained From:  electronic medical record    History of Present Illness: This is a 46 y.o. male who presented to the hospital with cardiac arrest . He apparently had called EMS for SOB. Initial eval including EKG was suggestive of AMI, he coded in ER and had return of pulses. He was taken urgently to cath lab and was noted to have multivessel disease . He was then taken to OR and underwent CABG. Initially was on pressors which were weaned off. Vent dependant     He had been doing ok until yesterday when he spiked a temp to 103 along with worsening leucocytosis . Pt has developed ALMITA and a significant decline in his UO since as well. Along with this , there has been a significant decrease in his hemoglobin and he has been transfused. He is also off his sedation and is not following any commands. Pt had exposed orthopedic hardware from his RT knee and is s/p removal of protruding orthopedic nail from his rt knee lateral area. He had previous ORIF of rt proximal tibia . His baseline creat is normal. He has a gamino cath .     pancultured yesterday     Creat has jumped to 1.99 in last 24 hrs. His blood pressure is still holding in low 057 systolic range off pressors. Other labs show acute rise in LFT's as well. Med review shows that he did receive a single dose of vancomycin yesterday. No hx of heavy or prolonged NSAID use. There is no history of blood or bone marrow disorders. There is no hx of jaundice or hepatitis or sexually transmitted disease. Pt has no hx of collagen vascular disease or vasculitis. No history of dysuria or frequency. No recent procedures involving IV contrast.   There is no hx of paraprotein disease. No hx of recurrent UTI , incontinence or recurrent nephrolithiasis.      Past Medical History: 0.9 % sodium chloride infusion Continuous   sodium chloride flush 0.9 % injection 10 mL 2 times per day   sodium chloride flush 0.9 % injection 10 mL PRN   calcium chloride 1 g in sodium chloride 0.9 % 100 mL IVPB PRN   magnesium sulfate 1 g in dextrose 5% 100 mL IVPB PRN   potassium chloride 20 mEq/50 mL IVPB (Central Line) PRN   acetaminophen (TYLENOL) tablet 650 mg Q4H PRN   acetaminophen (TYLENOL) suppository 650 mg Q4H PRN   oxyCODONE-acetaminophen (PERCOCET) 5-325 MG per tablet 1 tablet Q4H PRN   Or    oxyCODONE-acetaminophen (PERCOCET) 5-325 MG per tablet 2 tablet Q4H PRN   fentaNYL (SUBLIMAZE) injection 25 mcg Q1H PRN   Or    fentaNYL (SUBLIMAZE) injection 50 mcg Q1H PRN   diphenhydrAMINE (BENADRYL) tablet 25 mg Nightly PRN   polyethylene glycol (GLYCOLAX) packet 17 g Daily   bisacodyl (DULCOLAX) suppository 10 mg Daily PRN   ondansetron (ZOFRAN) injection 4 mg Q8H PRN   metoprolol tartrate (LOPRESSOR) tablet 25 mg BID   amiodarone (CORDARONE) tablet 200 mg BID   chlorhexidine (PERIDEX) 0.12 % solution 15 mL BID   hydrALAZINE (APRESOLINE) injection 5 mg Q5 Min PRN   therapeutic multivitamin-minerals 1 tablet Daily with breakfast   atorvastatin (LIPITOR) tablet 40 mg Nightly   clopidogrel (PLAVIX) tablet 75 mg Daily   albumin human 5 % bottle 25 g PRN   insulin lispro (HUMALOG) injection vial 0-12 Units TID    insulin lispro (HUMALOG) injection vial 0-6 Units Nightly   glucose (GLUTOSE) 40 % oral gel 15 g PRN   dextrose 50 % solution 12.5 g PRN   glucagon (rDNA) injection 1 mg PRN   dextrose 5 % solution PRN   phenylephrine (GIANNA-SYNEPHRINE) 50 mg in dextrose 5 % 250 mL infusion Continuous PRN   norepinephrine (LEVOPHED) 16 mg in dextrose 5% 250 mL infusion Continuous PRN       Allergies:  Patient has no allergy information on record.     Social History:   Social History     Socioeconomic History    Marital status: Unknown     Spouse name: Not on file    Number of children: Not on file    Years of education: Not on file    Highest education level: Not on file   Occupational History    Not on file   Social Needs    Financial resource strain: Not on file    Food insecurity:     Worry: Not on file     Inability: Not on file    Transportation needs:     Medical: Not on file     Non-medical: Not on file   Tobacco Use    Smoking status: Not on file   Substance and Sexual Activity    Alcohol use: Not on file    Drug use: Not on file    Sexual activity: Not on file   Lifestyle    Physical activity:     Days per week: Not on file     Minutes per session: Not on file    Stress: Not on file   Relationships    Social connections:     Talks on phone: Not on file     Gets together: Not on file     Attends Hoahaoism service: Not on file     Active member of club or organization: Not on file     Attends meetings of clubs or organizations: Not on file     Relationship status: Not on file    Intimate partner violence:     Fear of current or ex partner: Not on file     Emotionally abused: Not on file     Physically abused: Not on file     Forced sexual activity: Not on file   Other Topics Concern    Not on file   Social History Narrative    Not on file       Family History:   No family history on file. Review of Systems:    Constitutional: + high fever,   HEENT:  No headache, otalgia, itchy eyes, nasal discharge or sore throat. Cardiac:  No chest pain, dyspnea, orthopnea or PND. Chest:              No cough, phlegm or wheezing. Abdomen:  No abdominal pain, nausea or vomiting. Neuro:  No focal weakness, abnormal movements orseizure like activity. Skin:   No rashes, no itching. :   No hematuria, no pyuria, no dysuria, no flank pain. Extremities:  Mild  swelling or joint pains.       Objective:  CURRENT TEMPERATURE:  Temp: 100.6 °F (38.1 °C)  MAXIMUM TEMPERATURE OVER 24HRS:  Temp (24hrs), Av.9 °F (38.8 °C), Min:100.6 °F (38.1 °C), Max:103.1 °F (39.5 °C)    CURRENT RESPIRATORY RATE:  Resp: 16  CURRENT PULSE:  Pulse: 76  CURRENT BLOOD PRESSURE:  BP: (!) 127/53  24HR BLOOD PRESSURE RANGE:  Systolic (75RPD), QSZ:16 , Min:79 , LFC:335   ; Diastolic (11MKG), XVC:59, Min:39, Max:53    24HR INTAKE/OUTPUT:      Intake/Output Summary (Last 24 hours) at 4/12/2019 1229  Last data filed at 4/12/2019 1200  Gross per 24 hour   Intake 2366 ml   Output 450 ml   Net 1916 ml     Patient Vitals for the past 96 hrs (Last 3 readings):   Weight   04/11/19 0513 179 lb 9 oz (81.4 kg)   04/10/19 0609 178 lb 9.6 oz (81 kg)   04/09/19 0415 179 lb 3.7 oz (81.3 kg)     Physical Exam:  General appearance: intubated   Skin: warm and dry, no rash or erythema  Eyes: conjunctivae pale   ENT: : ETT in place     Neck: no carotid bruit ,no carotid Lymphadenopathy, noThyromegaly   Pulmonary: no wheezing or rhonchi. Decreased breath sounds . Cardiovascular: Normal S1 & S2,  No S3 or  S4, no Pericardial Rub no Murmur   Abdomen: soft nontender, non distended  no organomegaly,    Extremities:no cyanosis, clubbing + edema    Labs:   CBC:  Recent Labs     04/10/19  0440 04/11/19  0456 04/12/19  0534 04/12/19  0729   WBC 14.9* 10.2 12.4*  --    RBC 3.34* 3.37* 2.12*  --    HGB 10.1* 10.1* 6.6* 8.7*   HCT 32.0* 32.2* 20.7* 27.4*   MCV 95.8 95.5 97.6  --    MCH 30.2 30.0 31.1  --    MCHC 31.6 31.4 31.9  --    RDW 13.7 13.6 14.3  --    PLT See Reflexed IPF Result 100* 122*  --    MPV NOT REPORTED 11.8 12.4  --       BMP: Recent Labs     04/10/19  0440 04/11/19  0456 04/12/19  0534    137 142   K 4.4 3.6* 4.6   * 108* 113*   CO2 18* 20 18*   BUN 22* 19 43*   CREATININE 0.41* 0.43* 1.99*   GLUCOSE 212* 207* 148*   CALCIUM 7.5* 7.2* 7.1*        Phosphorus:  No results for input(s): PHOS in the last 72 hours. Magnesium:   Recent Labs     04/10/19  0440 04/11/19  0456 04/12/19  0534   MG 2.0 2.1 2.2     Albumin:   Recent Labs     04/12/19  1124   LABALBU 2.7*       Assessment:  1. ALMITA secondary to ischemic ATN.  Likely septic.   2. S/p CABG for multivessel disease. 3. HYPERTENSION    4. S/p cardiac arrest on admission  5. ? CVA  6. Likely infected hardware        Plan:  1. Will Check Renal Ultrasound to r/o element of obstruction and to assess the kidney size/echotexture. 2. Urine testing including Urinalysis, Urine sodium, , Urine protein and creatinine . Will check urinary eosinophils as well. 3. Will Order serum and urine protein electrophoresis, light chain ratio  to r/o occult paraprotein disease. 4. Complement levels. 5. Cont current fluids. May need an additional dose of lasix   6. Transfuse as needed. 7. High risk for needing RRT. Thank you for the consultation. Please do not hesitate to call with questions.     Electronically signed by Deangelo Hu MD on 4/12/2019 at 12:29 PM

## 2019-04-12 NOTE — CARE COORDINATION
Patient remains intubated. Nephro and Neuro consults today. Medicaid bradley # [id number], N .   Likely rehab will be needed    Reda Mayjulieth given SNF list if needed

## 2019-04-12 NOTE — PROGRESS NOTES
Spoke with Jeannette Webb, NP informed her that P's Hgb was 8.7 after a redraw. Was told not to transfuse unit of blood ordered.

## 2019-04-12 NOTE — PROGRESS NOTES
0 [facility] called and asked about culture ordered for protruding screw from patients knee. Micro stated they did not receive the screw on Sunday when culture was ordered. [facility] called unit, located screw and will culture.

## 2019-04-12 NOTE — PROGRESS NOTES
The transport originated from [facility] [address], room 1012. Pt. was transported to CT-scan. Assisting with the transport was RNs. Appropriate devices were applied to monitor the patient's condition during transport. Patient transported  via 40% O2 via ventilator. Patient tolerated well.         Bisi Coulter  11:20 AM

## 2019-04-12 NOTE — PLAN OF CARE
Problem: OXYGENATION/RESPIRATORY FUNCTION  Goal: Patient will maintain patent airway  4/12/2019 0751 by Fadumo Mike RCP  Outcome: Ongoing  4/11/2019 1946 by Norma Gallego RCP  Outcome: Ongoing  Goal: Patient will achieve/maintain normal respiratory rate/effort  Description  Respiratory rate and effort will be within normal limits for the patient  4/12/2019 0751 by Fadumo Mike RCP  Outcome: Ongoing  4/11/2019 1946 by Norma Gallego RCP  Outcome: Ongoing     Problem: MECHANICAL VENTILATION  Goal: Patient will maintain patent airway  4/12/2019 0751 by Fadumo Mike RCP  Outcome: Ongoing  4/11/2019 1946 by Norma Gallego RCP  Outcome: Ongoing  Goal: Oral health is maintained or improved  4/12/2019 0751 by Fadumo Mike RCP  Outcome: Ongoing  4/11/2019 1946 by Norma Gallego RCP  Outcome: Ongoing  Goal: ET tube will be managed safely  4/12/2019 0751 by Fadumo Mike RCP  Outcome: Ongoing  4/11/2019 1946 by Norma Gallego RCP  Outcome: Ongoing  Goal: Ability to express needs and understand communication  4/12/2019 0751 by Fadumo Mike RCP  Outcome: Ongoing  4/11/2019 1946 by Norma Gallego RCP  Outcome: Ongoing  Goal: Mobility/activity is maintained at optimum level for patient  4/12/2019 0751 by Fadumo Mike RCP  Outcome: Ongoing  4/11/2019 1946 by Norma Gallego RCP  Outcome: Ongoing     Problem: Falls - Risk of:  Goal: Will remain free from falls  Description  Will remain free from falls  4/12/2019 0143 by Mine Rubin RN  Outcome: Ongoing     Problem: Risk for Impaired Skin Integrity  Goal: Tissue integrity - skin and mucous membranes  Description  Structural intactness and normal physiological function of skin and  mucous membranes.   4/12/2019 0143 by Mine Rubin RN  Outcome: Ongoing     Problem: Restraint Use - Nonviolent/Non-Self-Destructive Behavior:  Goal: Absence of restraint indications  Description  Absence of restraint indications  4/12/2019 0143 by Jaycob Aldana RN  Outcome: Ongoing     Problem: SKIN INTEGRITY  Goal: Skin integrity is maintained or improved  4/12/2019 0751 by Luann Kirkpatrick RCP  Outcome: Ongoing  4/12/2019 0143 by Jaycob Aldana RN  Outcome: Ongoing  4/11/2019 1946 by Zaria Boles RCP  Outcome: Ongoing     Problem: Cardiac Output - Decreased:  Goal: Hemodynamic stability will improve  Description  Hemodynamic stability will improve  4/12/2019 0143 by Jaycob Aldana RN  Outcome: Ongoing     Problem: Fluid Volume - Imbalance:  Goal: Ability to achieve a balanced intake and output will improve  Description  Ability to achieve a balanced intake and output will improve  4/12/2019 0143 by Jaycob Aldana RN  Outcome: Ongoing

## 2019-04-12 NOTE — PROGRESS NOTES
[facility]   Progress Note                   Date:   4/12/2019  Patient name: Celine Yuen  Date of admission:  4/7/2019  4:48 AM  MRN:   [id number]  YOB: 1965  PCP: No primary care provider on file. Reason for Admission:  stemi     Subjective:       POD#5( emergent CABG)    @ overnight , temp spike to 103. Leukocytosis is worsening  U/o has decreased and pt has become oliguric with no improvement after albumin and lasix  Pt is not responding to painful stimuli.   Off the sedation currently    Medications:   Scheduled Meds:   aspirin  81 mg Oral Daily    docusate  100 mg Oral Daily    [Held by provider] furosemide  40 mg Intravenous BID    vancomycin (VANCOCIN) intermittent dosing (placeholder)   Other RX Placeholder    vancomycin  1,000 mg Intravenous Q12H    dextrose  12.5 g Intravenous Once    albuterol  2.5 mg Nebulization Q6H    sodium chloride flush  10 mL Intravenous 2 times per day    sodium chloride flush  10 mL Intravenous 2 times per day    polyethylene glycol  17 g Oral Daily    famotidine  20 mg Oral BID    metoprolol tartrate  25 mg Oral BID    amiodarone  200 mg Oral BID    chlorhexidine  15 mL Mouth/Throat BID    therapeutic multivitamin-minerals  1 tablet Oral Daily with breakfast    atorvastatin  40 mg Oral Nightly    enoxaparin  40 mg Subcutaneous Daily    clopidogrel  75 mg Oral Daily    insulin lispro  0-12 Units Subcutaneous TID WC    insulin lispro  0-6 Units Subcutaneous Nightly       Continuous Infusions:   midazolam Stopped (04/11/19 0800)    sodium chloride 75 mL/hr at 04/07/19 1145    dextrose      phenylephrine (GIANNA-SYNEPHRINE) 50mg/250mL infusion Stopped (04/08/19 1418)    norepinephrine Stopped (04/08/19 0230)       CBC:   Recent Labs     04/10/19  0440 04/11/19  0456 04/12/19  0534 04/12/19  0729   WBC 14.9* 10.2 12.4*  --    HGB 10.1* 10.1* 6.6* 8.7*   PLT See Reflexed IPF Result 100* 122*  --      BMP:    Recent Labs 04/10/19  0440 04/11/19  0456 04/12/19  0534    137 142   K 4.4 3.6* 4.6   * 108* 113*   CO2 18* 20 18*   BUN 22* 19 43*   CREATININE 0.41* 0.43* 1.99*   GLUCOSE 212* 207* 148*     Hepatic:   No results for input(s): AST, ALT, ALB, BILITOT, ALKPHOS in the last 72 hours. Troponin: No results for input(s): TROPONINI in the last 72 hours. BNP: No results for input(s): BNP in the last 72 hours. Lipids: No results for input(s): CHOL, HDL in the last 72 hours. Invalid input(s): LDLCALCU  INR:   Recent Labs     04/10/19  0440 04/11/19  0456 04/12/19  0534   INR 1.7 1.4 1.4       Objective:   Vitals: BP (!) 127/53   Pulse 84   Temp 100.6 °F (38.1 °C) (Core)   Resp 16   Ht 5' 8\" (1.727 m)   Wt 179 lb 9 oz (81.4 kg)   SpO2 96%   BMI 27.30 kg/m²     General appearance: sedated and intubated  HEENT: Head: Normocephalic, no lesions, without obvious abnormality  Neck: no JVD  Lungs:  Mechanical ventilator  Heart: regular rate and rhythm, S1, S2 normal, no murmur, click, rub or gallop  Abdomen: soft, non-tender; bowel sounds normal  Extremities: No LE edema  Neurologic: SEDATED      EKG: Sinus tachycardia/ Anterior septal infarct      Echocardiogram:  pending    4/7/19  Coronary Angiography:     LMCA: has distal 60% stenosis. LAD: has ostial 90% stenosis and long proximal 80% stenosis. D1 has proximal 70% stenosis. LCx: has 20% stenosis. OM1 is very small. OM2 has proximal 90% stenosis. RCA: has proximal 50% stenosis, mid 60% stenosis and distal 80% stenosis. Ramus: has ostial 99% stenosis.     The LV gram was performed in the DUTTON 30 position. LVEF: 15%. LV Wall Motion: severe hypokinesis of the anterior wall and mid inferior wall with apical akinesis.          Assessment:   1 PEA Arrest( Multiple times) ROSC in few minutes S/p weaning off from paralytic  2 Late presentation of Anterior septal STEMI   3 Multivessel CAD s/p emergent CABG( 4/7) ( LIMA-LAD, SVG-PDA,OM 1 & distal circumflex) 4/7 S/P sternal closure on 4/8  4 Ischemic cardiomyopathy with acute systolic CHF( ef 49% ANGELA)  5 NSVT  6 Post op anemia  7 Post op thrombocytopenia  8 Acute pulmonary edema S/p intubation  9 Sepsis   10 Acute renal failure   11 Metabolic encephalopathy      Treatment Plan:   1  Urgent CABG( 4/7). Sternal closure ( 4/8). Lopressor 25 mg BID. Pt is oliguric and U/O has decreased drastically. Lasix 40 IV & albumin given. Will recommend holding the lasix for now. Will consider the Nephrology evaluation. He is still positive. CVP is mildly elevated but in septic picture. will give fluids. 2 Blood culture and urine culture follow up. Infected hardware with ? Purulent secretions? Removal of hardware per ID recommendation. 3Will recommend CT head and neurology follow up. 4Post op care per CT Surgery. 5  Continue with Asa, plavix and lipitor. 6 Keep K>4, Mag>2 and Calcium in normal range. Discussed with patient and nursing. Hermilo Teran MD  Fellow cardiology    . Attending Cardiologist Addendum: I have reviewed and performed the history, physical, subjective, objective, assessment, and plan with the resident/fellow and agree with the note. I performed the history and physical personally. I have made changes to the note above as needed. Thank you for allowing me to participate in the care of this patient, please do not hesitate to call if you have any questions.     Marina Wong MD MSc. [facility]  [phone]

## 2019-04-12 NOTE — PROGRESS NOTES
[facility] - Progress Note    Today's Date and Time: 4/12/2019, 12:17 PM    Impression :   1. CAD with cardiac arrest x 3. (on the field, in ER and in OR)  2. S/P emergent CABG x 4 on 4-7-19  3. S/p delayed primary closure of chest incision on 4-8-19  4. Improvement in Poor cardiac function with LVEF 10%-->45%  5. Open chest post CABG. Plans for closure on 4-8-19  6. Exposed orthopedic hardware  7. S/P removal of Protruding orthopedic nail from Rt lateral knee area. Old blood and malodor. 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 8. S/P ORIF Rt proximal tibia. Non compliant with follow up. 9. Fevers  10. Pulmonary edema  11. ALMITA (contrast, Lasix)    Recommendations:     · D/C vancomycin   · Start ceftaroline because of ALMITA, adjusted for renal failure    Medical Decision Making/Summary/Discussion:   · Patient admitted with chest pain and cardiac arrests x 3  · Emergent CABG x 4  · Found to have protruding Rt tibial orthopedic screw from prior ORIF of tibial fracture  · No overt infection  · Temp 103 on 4-11-19. Repeat blood cultures: No growth . Sputum with mixed oral tabitha. Vancomycin started. · ALMITA with Creatinine 0.4-->1. 19. Vancomycin d/c. Ceftaroline started. Infection Control Recommendations   · [place] Precautions  · Contact Isolation     Antimicrobial Stewardship Recommendations     · Discontinuation of therapy    Coordination of Outpatient Care:   · Estimated Length of IV antimicrobials: D/C 4-11-19  · Patient will need Midline Catheter Insertion:   · Patient will need PICC line Insertion:  · Patient will need: Home IV , [place],  SNF,  LTAC: TBD  · Patient will need outpatient wound care:TBD    Chief complaint/reason for consultation:   · Emergent CABG  · Protruding orthopedic nail from Rt knee      History of Present Illness:   Channing De La Cruz is a 46y.o.-year-old  male who was initially admitted on 4/7/2019.  Patient seen at the request of Edmund Larios. INITIAL HISTORY:     Patient apparently developed chest pain and called EMS. Had cardiac arrest at the scene, was resuscitated and brought to ER where he had second cardiac arrest. Was resuscitated once again and taken to OR for emergent CABG x 4. He had another cardiac arrest in OR. Has survived the above and is back in the ICU. Found to have area of malodor on lateral aspect Rt knee, corresponding to a protruding orthopedic screw from a remote ORIF of the Rt  Tibial plateau. The screw has been removed and sent for culture. X rays show healed fracture of the proximal tibia. There is additional hardware in place that will likely need to be removed if patient survives. CURRENT EVALUATION : 4/12/2019    Febrile in last 24 hours, Tmax of 39.5. VS stable . Off dobutamine. Still intubated. Off sedation. Not responding to commands or withdrawing from pain. CT of head ordered, showed scattered infarcts of different ages. Reccommended MRI follow up. Neurology consult placed. Cardiology increased his lasix to 40 mg IV BID for pulmonary edema. Currently being held. Had problems with NSVT during the night on 4-8-19. Suppressed with amiodarone     WBCs trended up to 12.4 today from 10.2 yesterday. Creatinine jumped to 1.99 today, from 0.43 yesterday. Received contrast recently. Nephrology consulted. LFTs ordered today. Albumin 2.7. Bilirubin 1.90, 1.30 direct. Total protein 4.8.     4/12 sputum culture collected, mixed bacterial morphotypes seen on exam.     CXR 4-10-19 showed congestion, pulmonary edema and effusions. Echocardiogram completed yesterday, results pending. Sternum closed on 4/8    Currently on Vancomycin and Unasyn. Will d/c both antibiotics. Patient placed on Ceftaroline. Multiple lines. No inflammation    Cultures:  Wound:  · Orthopedic screw:No culture data    Discussed with RN.           I have personally reviewed the past medical history, past surgical history, medications, social history, and family history, and I have updated the database accordingly.   Past Medical History:     Past Medical History:   Diagnosis Date    CAD (coronary artery disease) 04/07/2019    Cardiac arrest ([facility] 04/07/2019    Osteolysis 04/07/2019    R Knee hardware    PEA (Pulseless electrical activity) ([facility] 04/07/2019    [facility]07/2019       Past Surgical  History:     Past Surgical History:   Procedure [place] Date    CARDIAC CATHETERIZATION  04/07/2019    MVD-Stv    CORONARY ARTERY BYPASS GRAFT  04/07/2019    x4 per Dr. Denilson Gaytan, [facility] GRAFT N/A 4/7/2019    EMERGENT CPR,  CORONARY ARTERY BYPASS X4, ON PUMP ,CHEST LEFT OPEN; SWAN, ANGELA PER ANESTHESIA performed by Trevor Faria MD at [address] [place] [address],[address]      unsure if correct known tibial surgery    HARDWARE REMOVAL Right 04/07/2019    R Knee internal screw protruding through skin    RECONSTRUCTIVE REPAIR STERNAL N/A 4/8/2019    STERNAL  WOUND WASHOUT AND CLOSURE S/P CABG performed by Trevor Faria MD at [address] [place] [address] Right     [facility] off roof, external fixation and internal hardware    TONSILLECTOMY         Medications:      nystatin  5 mL Oral 4x Daily    heparin (porcine)  5,000 Units Subcutaneous 3 times per day    metoclopramide  5 mg Intravenous Q6H    [START ON 4/13/2019] famotidine  20 mg Oral Daily    aspirin  81 mg Oral Daily    docusate  100 mg Oral Daily    [Held by provider] furosemide  40 mg Intravenous BID    vancomycin (VANCOCIN) intermittent dosing (placeholder)   Other RX Placeholder    dextrose  12.5 g Intravenous Once    albuterol  2.5 mg Nebulization Q6H    sodium chloride flush  10 mL Intravenous 2 times per day    sodium chloride flush  10 mL Intravenous 2 times per day    polyethylene glycol  17 g Oral Daily    metoprolol tartrate  25 mg Oral BID  amiodarone  200 mg Oral BID    chlorhexidine  15 mL Mouth/Throat BID    therapeutic multivitamin-minerals  1 tablet Oral Daily with breakfast    atorvastatin  40 mg Oral Nightly    clopidogrel  75 mg Oral Daily    insulin lispro  0-12 Units Subcutaneous TID WC    insulin lispro  0-6 Units Subcutaneous Nightly       Social History:     Social History     Socioeconomic History    Marital status: Unknown     Spouse name: Not on file    Number of children: Not on file    Years of education: Not on file    Highest education level: Not on file   Occupational History    Not on file   Social Needs    Financial resource strain: Not on file    Food insecurity:     Worry: Not on file     Inability: Not on file    Transportation needs:     Medical: Not on file     Non-medical: Not on file   Tobacco Use    Smoking status: Not on file   Substance and Sexual Activity    Alcohol use: Not on file    Drug use: Not on file    Sexual activity: Not on file   Lifestyle    Physical activity:     Days per week: Not on file     Minutes per session: Not on file    Stress: Not on file   Relationships    Social connections:     Talks on phone: Not on file     Gets together: Not on file     Attends Shinto service: Not on file     Active member of club or organization: Not on file     Attends meetings of clubs or organizations: Not on file     Relationship status: Not on file    Intimate partner violence:     Fear of current or ex partner: Not on file     Emotionally abused: Not on file     Physically abused: Not on file     Forced sexual activity: Not on file   Other Topics Concern    Not on file   Social History Narrative    Not on file       Family History:   No family history on file. Allergies:   Patient has no allergy information on record. Review of Systems:   Unable to provide. Sedated on ventilator.     Physical Examination :     Patient Vitals for the past 8 hrs:   BP Temp Temp src Pulse Resp SpO2 10.1* 6.6* 8.7*   HCT 32.2* 20.7* 27.4*   * 122*  --      BMP:   Recent Labs     04/11/19  0456 04/12/19  0534    142   K 3.6* 4.6   * 113*   CO2 20 18*   BUN 19 43*   CREATININE 0.43* 1.99*   MG 2.1 2.2     Hepatic Function Panel:   Recent Labs     04/12/19  1124   PROT 4.8*   LABALBU 2.7*   BILIDIR 1.30*   IBILI 0.60   BILITOT 1.90*   ALKPHOS 71   ALT PENDING   AST PENDING     No results for input(s): RPR in the last 72 hours. No results for input(s): HIV in the last 72 hours. No results for input(s): BC in the last 72 hours. Lab Results   Component Value Date    RBC 2.12 04/12/2019    WBC 12.4 04/12/2019     Lab Results   Component Value Date    CREATININE 1.99 04/12/2019    GLUCOSE 148 04/12/2019       Medical Decision Making-Imaging:     EXAMINATION:   3 XRAY VIEWS OF THE RIGHT KNEE       4/7/2019 5:00 pm       COMPARISON:   04/07/2019       HISTORY:   ORDERING SYSTEM PROVIDED HISTORY: s/p Hardware removal at bedside   TECHNOLOGIST PROVIDED HISTORY:   s/p Hardware removal at bedside       FINDINGS:   Interval removal of anterior superior fixating screw.  No periprosthetic   fracture.  Chronic deformity of the proximal fibula.  Degenerative changes of   knee and tib-fib syndesmosis.         Impression   Removal of anterior-superior fixating screw without interval change otherwise     EXAMINATION:   3 XRAY VIEWS OF THE RIGHT KNEE       4/7/2019 3:28 pm       COMPARISON:   None.       HISTORY:   ORDERING SYSTEM PROVIDED HISTORY: Trauma/Fracture   TECHNOLOGIST PROVIDED HISTORY:   Trauma/Fracture       FINDINGS:   Sideplate and 4 screws transfix the proximal tibia laterally.  Old healed   fracture proximal fibular neck and head.  Spurring lateral tibia plateau.    Proximal tibiofibular syndesmosis.  Visualized femur and patella normal.   Soft tissues unremarkable.           Impression   ORIF proximal tibia.  No acute fracture.         EXAMINATION:   SINGLE XRAY VIEW OF THE CHEST     4/10/2019 6:47 am       COMPARISON:   Chest radiograph performed 04/09/2019.       HISTORY:   ORDERING SYSTEM PROVIDED HISTORY: post op   TECHNOLOGIST PROVIDED HISTORY:   post op       FINDINGS:   There is redemonstration of bilateral pulmonary congestion/edema that is   similar compared to prior. Margert Osiel are bibasilar effusions. Margert Osiel is no   pneumothorax.  The heart is prominent.  The upper abdomen is unremarkable. The extrathoracic soft tissues are unremarkable. Margert Osiel is an endotracheal   tube with the tip in the midtrachea.  There is a gastric tube with the tip in   the proximal aspect of the stomach.  The right internal jugular line has been   removed.  The [place] catheter has been removed.           Impression   Cardiomegaly and bilateral pulmonary congestion/edema that is similar   compared to prior.       Bibasilar effusions and stable endotracheal tube and gastric tube.             Medical Decision Making-Other: Thank you for allowing us to participate in the care of this patient. Please call with questions. Lars Pressley     ATTESTATION:    I have discussed the case, including pertinent history and exam findings with the residents. I have seen and examined the patient and the key elements of the encounter have been performed by me. I have reviewed the laboratory data, other diagnostic studies and discussed them with the residents. I have updated the medical record where necessary. I agree with the assessment, plan and orders as documented by the resident.     dAdison Loco MD.

## 2019-04-12 NOTE — PROGRESS NOTES
Progress Note    Patient - Erlinda Stein  Date of Admission -  2019  4:48 AM  Date of Evaluation -  2019  Room and Bed Number -  1012/1012-01   Hospital Day - 5    CHIEF COMPLAINT :   Chief Complaint   Patient presents with    Cardiac Arrest     diff breather, brought in on cpap with elevations in EKG on EMS arrival.        SUBJECTIVE:     OVERNIGHT EVENTS:         Continues to be febrile. So far blood cultures are negative, urine and sputum culture are pending. However, we have been unable to track down hardwood culture. He has been off sedation that was Versed for last 24 hours. He's not responding to commands or deep pain. He does have mild gag and cough since yesterday. CT head has been done and neurology evaluation planned for today. His urine output has decreased renal function has worsened.       AWAKE & FOLLOWING COMMANDS: sedation holiday today     SECRETIONS Amount:  [x] Small [] Moderate  [] Large  [] None  Color:     [x] White [] Colored  [] Bloody    SEDATION:  RAAS Score:  [] Propofol gtt  [] Versed gtt  [] Ativan gtt   [] No Sedation    PARALYZED:  [x] No    [] Yes    VASOPRESSORS:  [x] No    [] Yes  [] Levophed [] Dopamine [] Vasopressin  [] Dobutamine [] Phenylephrine [] Epinephrine   Ros unable to perform due to intubation and ENCEPHALOPATHY     OBJECTIVE:     VITAL SIGNS:  BP (!) 127/53   Pulse 78   Temp 100.6 °F (38.1 °C) (Core)   Resp 16   Ht 5' 8\" (1.727 m)   Wt 179 lb 9 oz (81.4 kg)   SpO2 99%   BMI 27.30 kg/m²    Tmax over 24 hours:  Temp (24hrs), Av.1 °F (38.9 °C), Min:100.6 °F (38.1 °C), Max:103.1 °F (39.5 °C)      Patient Vitals for the past 8 hrs:   BP Temp Temp src Pulse Resp SpO2   19 1130 -- -- -- 78 -- 99 %   19 1115 -- -- -- 78 -- 98 %   19 1112 -- -- -- 78 16 97 %   19 1100 -- -- -- 80 -- 98 %   19 1045 -- -- -- 80 16 97 %   19 1000 -- -- -- 80 -- 98 %   1945 -- -- -- 80 -- 97 %   19 -- -- -- 81 -- 97 %   04/12/19 0915 -- -- -- 82 -- 97 %   04/12/19 0900 (!) 127/53 100.6 °F (38.1 °C) CORE 84 -- 96 %   04/12/19 0845 -- -- -- 84 -- 96 %   04/12/19 0830 -- -- -- 85 -- 97 %   04/12/19 0815 -- -- -- 85 -- 97 %   04/12/19 0800 -- -- -- 84 -- 97 %   04/12/19 0745 -- -- -- 80 -- 97 %   04/12/19 0730 -- -- -- 81 16 98 %   04/12/19 0718 -- 101.1 °F (38.4 °C) Oral -- -- --   04/12/19 0715 -- -- -- 80 -- 98 %   04/12/19 0700 -- -- -- 80 -- 97 %   04/12/19 0600 -- 101.1 °F (38.4 °C) Oral -- -- --   04/12/19 0500 -- -- -- 80 18 95 %   04/12/19 0400 -- 102.5 °F (39.2 °C) Oral 84 17 97 %           Intake/Output Summary (Last 24 hours) at 4/12/2019 1146  Last data filed at 4/12/2019 1100  Gross per 24 hour   Intake 2366 ml   Output 430 ml   Net 1936 ml     Date 04/12/19 0000 - 04/12/19 2359   Shift 7736-5809 7970-3535 0523-0831 24 Hour Total   INTAKE   I.V.(mL/kg) 1454(17.9)   1454(17.9)   NG/GT(mL/kg)  150(1.8)  150(1.8)   Shift Total(mL/kg) 3560(57.5) 150(1.8)  1604(19.7)   OUTPUT   Urine(mL/kg/hr) 110(0.2) 90  200   Shift Total(mL/kg) 110(1.4) 90(1.1)  200(2.5)   Weight (kg) 81.4 81.4 81.4 81.4     Wt Readings from Last 3 Encounters:   04/11/19 179 lb 9 oz (81.4 kg)     Body mass index is 27.3 kg/m². PHYSICAL EXAM:  Head and neck atraumatic, normocephalic    Lymph nodes-no cervical, supraclavicular lymphadenopathy    ICTRUS IN CONJUNCTIVA    oral et and og     Lungs - Ventilating all lobes  Dec post bases and rales post     CVS- S1, S2 regular. No S3 no S4, no murmurs    strenotomy     Abdomen-nontender, nondistended. Bowel sounds are present.   MILD HEPATOMEGALY     Lower extremity-+ edema    Upper extremity-no edema    Neurological-on sedation holiday- FOR LAST 24 HRS NO RESPONSE TO DEEP PAIN , MILD GAG AND COUGH     Rt knee sinus tract       MEDICATIONS:  Scheduled Meds:   nystatin  5 mL Oral 4x Daily    heparin (porcine)  5,000 Units Subcutaneous 3 times per day    metoclopramide  5 mg Intravenous Q6H    [START ON 4/13/2019] famotidine  20 mg Oral Daily    aspirin  81 mg Oral Daily    docusate  100 mg Oral Daily    [Held by provider] furosemide  40 mg Intravenous BID    vancomycin (VANCOCIN) intermittent dosing (placeholder)   Other RX Placeholder    dextrose  12.5 g Intravenous Once    albuterol  2.5 mg Nebulization Q6H    sodium chloride flush  10 mL Intravenous 2 times per day    sodium chloride flush  10 mL Intravenous 2 times per day    polyethylene glycol  17 g Oral Daily    metoprolol tartrate  25 mg Oral BID    amiodarone  200 mg Oral BID    chlorhexidine  15 mL Mouth/Throat BID    therapeutic multivitamin-minerals  1 tablet Oral Daily with breakfast    atorvastatin  40 mg Oral Nightly    clopidogrel  75 mg Oral Daily    insulin lispro  0-12 Units Subcutaneous TID WC    insulin lispro  0-6 Units Subcutaneous Nightly     Continuous Infusions:   midazolam Stopped (04/11/19 0800)    sodium chloride 75 mL/hr at 04/07/19 1145    dextrose      phenylephrine (GIANNA-SYNEPHRINE) 50mg/250mL infusion Stopped (04/08/19 1418)    norepinephrine Stopped (04/08/19 0230)     PRN Meds:     albuterol 2.5 mg Q6H PRN   sodium chloride flush 10 mL PRN   magnesium hydroxide 30 mL Daily PRN   sodium chloride flush 10 mL PRN   calcium chloride IVPB 1 g PRN   magnesium sulfate 1 g PRN   potassium chloride 20 mEq PRN   acetaminophen 650 mg Q4H PRN   acetaminophen 650 mg Q4H PRN   oxyCODONE-acetaminophen 1 tablet Q4H PRN   Or     oxyCODONE-acetaminophen 2 tablet Q4H PRN   fentanNYL 25 mcg Q1H PRN   Or     fentanNYL 50 mcg Q1H PRN   diphenhydrAMINE 25 mg Nightly PRN   bisacodyl 10 mg Daily PRN   ondansetron 4 mg Q8H PRN   hydrALAZINE 5 mg Q5 Min PRN   albumin human 25 g PRN   glucose 15 g PRN   dextrose 12.5 g PRN   glucagon (rDNA) 1 mg PRN   dextrose 100 mL/hr PRN   phenylephrine (GIANNA-SYNEPHRINE) 50mg/250mL infusion 10 mcg/min Continuous PRN   norepinephrine 0.03 mcg/kg/min Continuous PRN       SUPPORT DEVICES: [x] Ventilator [] BIPAP  [] Nasal Cannula [] Room Air    VENT SETTINGS (Comprehensive) (if applicable):      Vent Information  $Ventilation: $Subsequent Day  Ventilator Started: Yes  Skin Assessment: Clean, dry, & intact  Equipment Changed: Suction catheter  Vent Type: Servo i  Vent Mode: PRVC  Vt Ordered: 660 mL  Rate Set: 16 bmp  Pressure Support: 10 cmH20  FiO2 : 50 %  Sensitivity: 5  PEEP/CPAP: 8  I Time/ I Time %: 0.85 s  Humidification Source: Heated wire  Humidification Temp: 37  Humidification Temp Measured: 37  Circuit Condensation: Drained  Nitric Oxide/Epoprostenol In Use?: No  Additional Respiratory  Assessments  Pulse: 78  Resp: 16  SpO2: 99 %  End Tidal CO2: 29 (%)  Position: Reverse Trendelenburg  Humidification Source: Heated wire  Humidification Temp: 37  Circuit Condensation: Drained  Oral Care Completed?: Yes  Oral Care: Mouthwash, Mouth moisturizer, Mouth suctioned  Subglottic Suction Done?: Yes  Skin barrier applied: Yes  Results for Desean Rogel (MRN [id number]) as of 4/12/2019 11:46   Ref.  Range 4/12/2019 05:05   POC pH Latest Ref Range: 7.350 - 7.450  7.372   POC pH Temp Unknown 7.35   POC pCO2 Latest Ref Range: 35.0 - 48.0 mm Hg 36.2   POC pCO2 Temp Latest Units: mm Hg 39   POC PO2 Latest Ref Range: 83.0 - 108.0 mm Hg 71.7 (L)   POC pO2 Temp Latest Units: mm Hg 79   POC HCO3 Latest Ref Range: 21.0 - 28.0 mmol/L 21.0   POC O2 SAT Latest Ref Range: 94.0 - 98.0 % 94     ABGs:     Lab Results   Component Value Date    RNM5BEY 22 04/12/2019    FIO2 50.0 04/12/2019       DATA:  Complete Blood Count:   Recent Labs     04/10/19  0440 04/11/19  0456 04/12/19  0534 04/12/19  0729   WBC 14.9* 10.2 12.4*  --    RBC 3.34* 3.37* 2.12*  --    HGB 10.1* 10.1* 6.6* 8.7*   HCT 32.0* 32.2* 20.7* 27.4*   MCV 95.8 95.5 97.6  --    MCH 30.2 30.0 31.1  --    MCHC 31.6 31.4 31.9  --    RDW 13.7 13.6 14.3  --    PLT See Reflexed IPF Result 100* 122*  --    MPV NOT REPORTED 11.8 12.4  --         Last 3 Blood Glucose: Recent Labs     04/10/19  0440 04/11/19  0456 04/12/19  0534   GLUCOSE 212* 207* 148*        PT/INR:    Lab Results   Component Value Date    PROTIME 14.7 04/12/2019    INR 1.4 04/12/2019     PTT:    Lab Results   Component Value Date    APTT 25.7 04/07/2019       Comprehensive Metabolic Profile:   Recent Labs     04/10/19  0440 04/11/19  0456 04/12/19  0534    137 142   K 4.4 3.6* 4.6   * 108* 113*   CO2 18* 20 18*   BUN 22* 19 43*   CREATININE 0.41* 0.43* 1.99*   GLUCOSE 212* 207* 148*   CALCIUM 7.5* 7.2* 7.1*      Magnesium:   Lab Results   Component Value Date    MG 2.2 04/12/2019    MG 2.1 04/11/2019    MG 2.0 04/10/2019     Phosphorus: No results found for: PHOS  Ionized Calcium:   Lab Results   Component Value Date    CAION 0.97 04/09/2019    CAION 1.01 04/07/2019        Urinalysis: No results found for: NITRU, COLORU, PHUR, LABCAST, WBCUA, RBCUA, MUCUS, TRICHOMONAS, YEAST, BACTERIA, CLARITYU, SPECGRAV, LEUKOCYTESUR, UROBILINOGEN, BILIRUBINUR, BLOODU, GLUCOSEU, KETUA, AMORPHOUS    HgBA1c:    Lab Results   Component Value Date    LABA1C 10.8 04/07/2019     Xray   NO XRAY     CT HEAD NOTED     ASSESSMENT:     Patient Active Problem List    Diagnosis Date Noted    Cardiac arrest ([facility] [address] 04/07/2019    Severe left ventricular systolic dysfunction 36/95/9513    Acute respiratory failure with hypoxemia ([facility] [address] 04/07/2019    Cardiogenic pulmonary edema ([facility] [address] 04/07/2019    PAD (peripheral artery disease) ([facility] [address] 04/07/2019    Acute ST elevation myocardial infarction (STEMI) of anterior wall ([address]     Subacute osteomyelitis of right tibia ([facility] [address]     Exposed orthopaedic hardware ([address]       Acute kidney injury- and OLIGOuric  Sirs positive sepsis SEPSIS    CVA, remote and subacute   Mild jaundice   Encephalopathy, metabolic versus ischemic   PLAN:     WEAN PER PROTOCOL:  [] No   [x] Yes  [] N/A    DISCONTINUE ANY LABS:   [x] No   [] Yes    ICU PROPHYLAXIS:  Stress ulcer:  [] PPI Agent  [x] [id number] [] Sucralfate  [] Other:  VTE:   [] Enoxaparin  [x] Unfract. Heparin Subcut  [] EPC Cuffs    NUTRITION:  [] NPO [x] start Tube Feeding  [] TPN  [] PO        INSULIN DRIP:   [x] No   [] Yes    CONSULTATION NEEDED:  [x] No   [] Yes    FAMILY UPDATED:    [] No   [] Yes    TRANSFER OUT OF ICU:   [x] No   [] Yes    ADDITIONAL PLAN:  -Gas exchange is stable   Patient is on FiO2 of 50% and PEEP of 8, with stable, PaO2. [place] He continues to be febrile. Blood cultures are negative so far. His urine output is decreased and his renal function is worsening. Patient has been on vancomycin. We will check a trough before the next dose. Patient is to be started on Lasix drip as per nephrology and if creatinine continues to worsen, will need dialysis. CT head noted. Agree with neurology evaluation and keep patient off sedatives. Start Reglan for 4 doses and start to feeds at 10 ML's and escalate as tolerated. Will discuss with infectious disease regarding need for hardware removal from right lower extremity. .  Check amylase, lipase and LFT   -Discussed with CTS  -      NHAN JUAN MD  4/12/2019 11:46 AM    Total critical care time caring for this patient with life threatening, unstable organ failure, including direct patient contact, management of life support systems, review of data including imaging and labs, discussions with other team members and physicians at least 28  Min so far today, excluding procedures.

## 2019-04-12 NOTE — PLAN OF CARE
Elian Richardson NP given update re: low urine output, ivf's, labs and intake and output. Orders received.

## 2019-04-12 NOTE — PROGRESS NOTES
Paged Dr. Molina Founds with the results of the redraw of Pt's Hgb of 8.7, was given orders not to transfuse the unit of blood ordered.

## 2019-04-12 NOTE — CONSULTS
NEUROLOGY INPATIENT CONSULT NOTE      4/12/2019         I had the pleasure of seeing your patient as a neurology consultation. As you would recall Chayo Moncada is a  46 y.o. male who was admitted on 4/7/2019 with Cardiac arrest ([facility] [address] [I46.9]. Per the records the patient called EMS for worsening shortness of breath; on EMS arrival patient was found to have audible rales, EKG concerning for STEMI. When EMS was arriving to ER the patient lost pulses, compressions were started with an initial rhythm of PEA. Patient received continued CPR and epi x2, ROSC after 3 rounds of CPR. Patient was taken emergently to cath lab where they found multivessel disease and he arrested again, was able to be resuscitated. He was then transferred to the OR immediately for CABG, he arrested again on arrival to the OR and was placed on coronary bypass. The patient is s/p CABG x4 on 4/7. He had chest closure on 4/8. Per the notes, the patient was able to follow commands, had spontaneous movement as he was trying to pull at his tubes on 4/10. Sedation was increased at that time due to patient irritability, and has since been weaned off. RN reports both Precedex and Versed drips have been off since 4/11 at 8am. Since that time the patient has not responded to commands, has not had spontaneous movement. He does have pupil reactivity, corneals, cough and gag, but is not breathing over the vent, per nursing staff. A CT head was done today showing scattered areas of encephalomalacia to bifrontal and high left parietal areas consistent with remote infarct/trauma, area of possible subacute infarct to left lower parietal region. Patient cannot have MRI at this time due to pacer wires. Kidney function has worsened with today's BUN/creatinine 43/1. 99. Liver enzymes have worsened with AST/ALT 3077/3818 respectively. No current facility-administered medications on file prior to encounter.       No current outpatient medications on file prior to 122*  --      BMP:    Recent Labs     04/10/19  0440 04/11/19  0456 04/12/19  0534    137 142   K 4.4 3.6* 4.6   * 108* 113*   CO2 18* 20 18*   BUN 22* 19 43*   CREATININE 0.41* 0.43* 1.99*   GLUCOSE 212* 207* 148*         Lab Results   Component Value Date    ALT 3,818 (H) 04/12/2019    AST 3,077 (H) 04/12/2019    INR 1.4 04/12/2019    LABA1C 10.8 (H) 04/07/2019           Diagnostic data reviewed:  CT HEAD (4/12/19) -  Scattered areas of encephalomalacia, bifrontal and high left parietal areas   consistent with remote infarct.  There is hypodensity in the left lower   parietal region possibly a subacute infarct.  Chronic microvascular changes   are noted.  A punctate density is noted adjacent to the encephalomalacia in   the left frontal lobe, favoring dystrophic calcification over petechial   hemorrhage. ECHO (4/7/19) -  LVEF: 15%. LV Wall Motion: severe hypokinesis of the anterior wall and mid inferior wall with apical akinesis. REPEAT ECHO - pending                      Impression and Plan: Mr. Tracey Orellana is a 46 y.o. male with multifactorial metabolic encephalopathy s/p cardiac arrest x3 and CABG x4, in the setting of ALMITA, elevated liver enzymes, febrile illness. CT head with bilateral frontal and high left parietal encephalomalacia, possible prior head injury as well as left parietal attenuation, possible new cerebral ischemic event    Will get EEG    Will get repeat CT head tomorrow, or MRI brain if pacer wires are removed    Will get carotid US    Repeat ECHO is pending    Will follow with you. Thank you for the consult.

## 2019-04-12 NOTE — CONSULTS
Nephrology Consult Note    Reason for Consult: ALMITA post CABG  Requesting Physician:  Dr. Cristian Doss    Chief Complaint:  Cardiac arrest 2/2 STEMI s/p CABG    History Obtained From:  electronic medical record    History of Present Illness: This is a 46 y.o. male with unknown medical history admitted for management of cardiac arrest and STEMI. Patient initially called EMS for worsening SOB, placed on CPAP, given breathing treatments, solumedrol, aspirin, transport initiated to [facility]. EKG done by EMS showed possible anterior septal STEMI. On arrival to [facility] ED, pulses were lost. ACLS protocol initiated, CPR initiated patient intubated. EKG showed PEA. Three rounds CPR with epi given, had return of pulses. Bedside ECHO at that time showed globally decreased systolic contraction. Cardiology consulted, agreed for cath. Cath done showed multivessel CAD with severe LV systolic dysfunction EF 56% with severe hypokinesis of ant and mid inferior wall and apical akinesis. Right iliac a also noted to be occluded. Patient transferred to ICU. Stat consult placed to CT surgery, patient taken for CABG, LIMA to LAD done with saphenous vein grafts to PDA, OM sequential, and distal circ. Chest left open due to cardiac swelling and edema with closure on POD 1. Post of EF 40%. Multiple NSVT runs noted overnight, amiodarone started. cxr showed congestion bilaterally. Patient taken off pressors. Lasix IV initated, urine output decreased. Repeat cxr showed worsening congestion, lasix increased, urine output appropriate at 7L. Low grade temp noted, sputum, blood, urine cultures done which showed no growth, ID consulted, vancomycin started. Overnight creatinine made significant increase to 1.99 and we were consulted for ALMITA post CABG.      Past Medical History:        Diagnosis Date    CAD (coronary artery disease) 04/07/2019    Cardiac arrest ([facility] [address] 04/07/2019    Osteolysis 04/07/2019    R Knee hardware    PEA (Pulseless electrical activity) ([facility] [address] 04/07/2019    PVD (peripheral vascular disease) ([facility] [address] 04/07/2019       Past Surgical History:        Procedure Laterality Date    CARDIAC CATHETERIZATION  04/07/2019    MVD-Stv    CORONARY ARTERY BYPASS GRAFT  04/07/2019    x4 per Dr. Kirill Burkett, [facility] GRAFT N/A 4/7/2019    EMERGENT CPR,  CORONARY ARTERY BYPASS X4, ON PUMP ,CHEST LEFT OPEN; SWAN, ANGELA PER ANESTHESIA performed by Dario Petty MD at [address] [facility]      unsure if correct known tibial surgery    HARDWARE REMOVAL Right 04/07/2019    R Knee internal screw protruding through skin    RECONSTRUCTIVE REPAIR STERNAL N/A 4/8/2019    STERNAL  WOUND WASHOUT AND CLOSURE S/P CABG performed by Dario Petty MD at [address] [facility] Right     [facility] off roof, external fixation and internal hardware    TONSILLECTOMY         Current Medications:      aspirin chewable tablet 81 mg Daily   docusate (COLACE) 50 MG/5ML liquid 100 mg Daily   [Held by provider] furosemide (LASIX) injection 40 mg BID   vancomycin (VANCOCIN) intermittent dosing (placeholder) RX Placeholder   vancomycin (VANCOCIN) 1000 mg in dextrose 5% 200 mL IVPB Q12H   midazolam (VERSED) 100 mg in dextrose 5% 100 mL infusion Continuous   albuterol (PROVENTIL) nebulizer solution 2.5 mg Q6H PRN   dextrose injection 12.5 g Once   albuterol (PROVENTIL) nebulizer solution 2.5 mg Q6H   sodium chloride flush 0.9 % injection 10 mL 2 times per day   sodium chloride flush 0.9 % injection 10 mL PRN   magnesium hydroxide (MILK OF MAGNESIA) 400 MG/5ML suspension 30 mL Daily PRN   0.9 % sodium chloride infusion Continuous   sodium chloride flush 0.9 % injection 10 mL 2 times per day   sodium chloride flush 0.9 % injection 10 mL PRN   calcium chloride 1 g in sodium chloride 0.9 % 100 mL IVPB PRN   magnesium sulfate 1 g in dextrose 5% 100 mL IVPB PRN   potassium chloride 20 mEq/50 mL IVPB (Central Line) PRN acetaminophen (TYLENOL) tablet 650 mg Q4H PRN   acetaminophen (TYLENOL) suppository 650 mg Q4H PRN   oxyCODONE-acetaminophen (PERCOCET) 5-325 MG per tablet 1 tablet Q4H PRN   Or    oxyCODONE-acetaminophen (PERCOCET) 5-325 MG per tablet 2 tablet Q4H PRN   fentaNYL (SUBLIMAZE) injection 25 mcg Q1H PRN   Or    fentaNYL (SUBLIMAZE) injection 50 mcg Q1H PRN   diphenhydrAMINE (BENADRYL) tablet 25 mg Nightly PRN   polyethylene glycol (GLYCOLAX) packet 17 g Daily   bisacodyl (DULCOLAX) suppository 10 mg Daily PRN   ondansetron (ZOFRAN) injection 4 mg Q8H PRN   famotidine (PEPCID) tablet 20 mg BID   metoprolol tartrate (LOPRESSOR) tablet 25 mg BID   amiodarone (CORDARONE) tablet 200 mg BID   chlorhexidine (PERIDEX) 0.12 % solution 15 mL BID   hydrALAZINE (APRESOLINE) injection 5 mg Q5 Min PRN   therapeutic multivitamin-minerals 1 tablet Daily with breakfast   atorvastatin (LIPITOR) tablet 40 mg Nightly   enoxaparin (LOVENOX) injection 40 mg Daily   clopidogrel (PLAVIX) tablet 75 mg Daily   albumin human 5 % bottle 25 g PRN   insulin lispro (HUMALOG) injection vial 0-12 Units TID WC   insulin lispro (HUMALOG) injection vial 0-6 Units Nightly   glucose (GLUTOSE) 40 % oral gel 15 g PRN   dextrose 50 % solution 12.5 g PRN   glucagon (rDNA) injection 1 mg PRN   dextrose 5 % solution PRN   phenylephrine (GIANNA-SYNEPHRINE) 50 mg in dextrose 5 % 250 mL infusion Continuous PRN   norepinephrine (LEVOPHED) 16 mg in dextrose 5% 250 mL infusion Continuous PRN       Allergies:  Patient has no allergy information on record.     Social History:   Social History     Socioeconomic History    Marital status: Unknown     Spouse name: Not on file    Number of children: Not on file    Years of education: Not on file    Highest education level: Not on file   Occupational History    Not on file   Social Needs    Financial resource strain: Not on file    Food insecurity:     Worry: Not on file     Inability: Not on file   [employer] sclera anicteric  ENT: :no thrush no pharyngeal congestion    Neck: no carotid bruit ,no  JVD,no carotid Lymphadenopathy, noThyromegaly Pulmonary: no wheezing or rhonchi. No rales heard. Cardiovascular: Normal S1 & S2,  No S3 or  S4, no Pericardial Rub no Murmur   Abdomen: soft nontender, bowel sounds present, no organomegaly,  no ascites  Extremities:no cyanosis, clubbing or edema    Labs:   CBC:  Recent Labs     04/10/19  0440 04/11/19  0456 04/12/19  0534 04/12/19  0729   WBC 14.9* 10.2 12.4*  --    RBC 3.34* 3.37* 2.12*  --    HGB 10.1* 10.1* 6.6* 8.7*   HCT 32.0* 32.2* 20.7* 27.4*   MCV 95.8 95.5 97.6  --    MCH 30.2 30.0 31.1  --    MCHC 31.6 31.4 31.9  --    RDW 13.7 13.6 14.3  --    PLT See Reflexed IPF Result 100* 122*  --    MPV NOT REPORTED 11.8 12.4  --       BMP: Recent Labs     04/10/19  0440 04/11/19  0456 04/12/19  0534    137 142   K 4.4 3.6* 4.6   * 108* 113*   CO2 18* 20 18*   BUN 22* 19 43*   CREATININE 0.41* 0.43* 1.99*   GLUCOSE 212* 207* 148*   CALCIUM 7.5* 7.2* 7.1*        Phosphorus:  No results for input(s): PHOS in the last 72 hours. Magnesium:   Recent Labs     04/10/19  0440 04/11/19  0456 04/12/19  0534   MG 2.0 2.1 2.2     Albumin: No results for input(s): LABALBU in the last 72 hours. IRON:  No results found for: IRON  Iron Saturation:  No components found for: PERCENTFE  TIBC:  No results found for: TIBC  FERRITIN:  No results found for: FERRITIN  SPEP: Lab Results   Component Value Date    PROT 5.1 04/07/2019     UPEP: No results found for: TPU     C3: No results found for: C3  C4: No results found for: C4  MPO ANCA:  No results found for: MPO .   PR3 ANCA:  No results found for: PR3  Urine Sodium:  No results found for: QUIRINO   Urine Creatinine:  No results found for: LABCREA  Urine Eosinophils: No results found for: UREO  Urine Protein:  No results found for: TPU  Urinalysis:  U/A: No results found for: NITRU, COLORU, PHUR, LABCAST, WBCUA, RBCUA, MUCUS, TRICHOMONAS, YEAST, BACTERIA, CLARITYU, SPECGRAV, LEUKOCYTESUR, UROBILINOGEN, [place], GLUCOSEU, [address] [facility], [address] [place] [address]      Radiology:  Reviewed as available.     Assessment:    ALMITA secondary to hypotension, contrast mediated   Cardiac arrest 2/2 MI  CAD s/p CABG x4  Sepsis - tachypenic, tachycardic, hypotensive, leukocytosis    Plan:    Continue Lasix 40mg IV BID due to severe oliguria      Electronically signed by Alma Baldwin MD on 4/12/2019 at 9:18 AM

## 2019-04-13 ENCOUNTER — APPOINTMENT (OUTPATIENT)
Dept: CT IMAGING | Age: 54
DRG: 165 | End: 2019-04-13
Payer: MEDICAID

## 2019-04-13 LAB
ABO/RH: NORMAL
ALLEN TEST: ABNORMAL
ANION GAP SERPL CALCULATED.3IONS-SCNC: 10 MMOL/L (ref 9–17)
ANTIBODY SCREEN: NEGATIVE
ARM BAND NUMBER: NORMAL
BLD PROD TYP BPU: NORMAL
BUN BLDV-MCNC: 48 MG/DL (ref 6–20)
BUN/CREAT BLD: ABNORMAL (ref 9–20)
CALCIUM SERPL-MCNC: 7.4 MG/DL (ref 8.6–10.4)
CHLORIDE BLD-SCNC: 108 MMOL/L (ref 98–107)
CO2: 18 MMOL/L (ref 20–31)
CREAT SERPL-MCNC: 1.69 MG/DL (ref 0.7–1.2)
CROSSMATCH RESULT: NORMAL
CULTURE: ABNORMAL
CULTURE: NO GROWTH
CULTURE: NO GROWTH
DIRECT EXAM: ABNORMAL
DISPENSE STATUS BLOOD BANK: NORMAL
EXPIRATION DATE: NORMAL
FIO2: 50
GFR AFRICAN AMERICAN: 52 ML/MIN
GFR NON-AFRICAN AMERICAN: 43 ML/MIN
GFR SERPL CREATININE-BSD FRML MDRD: ABNORMAL ML/MIN/{1.73_M2}
GFR SERPL CREATININE-BSD FRML MDRD: ABNORMAL ML/MIN/{1.73_M2}
GLUCOSE BLD-MCNC: 217 MG/DL (ref 75–110)
GLUCOSE BLD-MCNC: 257 MG/DL (ref 75–110)
GLUCOSE BLD-MCNC: 280 MG/DL (ref 75–110)
GLUCOSE BLD-MCNC: 287 MG/DL (ref 70–99)
GLUCOSE BLD-MCNC: 288 MG/DL (ref 75–110)
HCT VFR BLD CALC: 29.1 % (ref 40.7–50.3)
HEMOGLOBIN: 9.3 G/DL (ref 13–17)
INR BLD: 1.2
Lab: ABNORMAL
Lab: ABNORMAL
Lab: NORMAL
MAGNESIUM: 2.5 MG/DL (ref 1.6–2.6)
MCH RBC QN AUTO: 30.2 PG (ref 25.2–33.5)
MCHC RBC AUTO-ENTMCNC: 32 G/DL (ref 28.4–34.8)
MCV RBC AUTO: 94.5 FL (ref 82.6–102.9)
MODE: ABNORMAL
NEGATIVE BASE EXCESS, ART: 3 (ref 0–2)
NRBC AUTOMATED: 0.4 PER 100 WBC
O2 DEVICE/FLOW/%: ABNORMAL
PATIENT TEMP: 38.1
PDW BLD-RTO: 14.6 % (ref 11.8–14.4)
PLATELET # BLD: 169 K/UL (ref 138–453)
PMV BLD AUTO: 12.3 FL (ref 8.1–13.5)
POC HCO3: 20.6 MMOL/L (ref 21–28)
POC O2 SATURATION: 97 % (ref 94–98)
POC PCO2 TEMP: 34 MM HG
POC PCO2: 32.2 MM HG (ref 35–48)
POC PH TEMP: 7.4
POC PH: 7.42 (ref 7.35–7.45)
POC PO2 TEMP: 91 MM HG
POC PO2: 84.6 MM HG (ref 83–108)
POSITIVE BASE EXCESS, ART: ABNORMAL (ref 0–3)
POTASSIUM SERPL-SCNC: 4.6 MMOL/L (ref 3.7–5.3)
PROTHROMBIN TIME: 12.7 SEC (ref 9–12)
RBC # BLD: 3.08 M/UL (ref 4.21–5.77)
SAMPLE SITE: ABNORMAL
SODIUM BLD-SCNC: 136 MMOL/L (ref 135–144)
SPECIMEN DESCRIPTION: ABNORMAL
SPECIMEN DESCRIPTION: ABNORMAL
SPECIMEN DESCRIPTION: NORMAL
TCO2 (CALC), ART: 22 MMOL/L (ref 22–29)
TRANSFUSION STATUS: NORMAL
UNIT DIVISION: 0
UNIT NUMBER: NORMAL
WBC # BLD: 16.5 K/UL (ref 3.5–11.3)

## 2019-04-13 PROCEDURE — 85027 COMPLETE CBC AUTOMATED: CPT

## 2019-04-13 PROCEDURE — 6360000002 HC RX W HCPCS: Performed by: INTERNAL MEDICINE

## 2019-04-13 PROCEDURE — 99233 SBSQ HOSP IP/OBS HIGH 50: CPT | Performed by: INTERNAL MEDICINE

## 2019-04-13 PROCEDURE — 85610 PROTHROMBIN TIME: CPT

## 2019-04-13 PROCEDURE — 6360000002 HC RX W HCPCS: Performed by: THORACIC SURGERY (CARDIOTHORACIC VASCULAR SURGERY)

## 2019-04-13 PROCEDURE — 2580000003 HC RX 258: Performed by: NURSE PRACTITIONER

## 2019-04-13 PROCEDURE — 2700000000 HC OXYGEN THERAPY PER DAY

## 2019-04-13 PROCEDURE — 2580000003 HC RX 258: Performed by: INTERNAL MEDICINE

## 2019-04-13 PROCEDURE — 83735 ASSAY OF MAGNESIUM: CPT

## 2019-04-13 PROCEDURE — 82947 ASSAY GLUCOSE BLOOD QUANT: CPT

## 2019-04-13 PROCEDURE — 6370000000 HC RX 637 (ALT 250 FOR IP): Performed by: THORACIC SURGERY (CARDIOTHORACIC VASCULAR SURGERY)

## 2019-04-13 PROCEDURE — 94762 N-INVAS EAR/PLS OXIMTRY CONT: CPT

## 2019-04-13 PROCEDURE — 99291 CRITICAL CARE FIRST HOUR: CPT | Performed by: INTERNAL MEDICINE

## 2019-04-13 PROCEDURE — 99233 SBSQ HOSP IP/OBS HIGH 50: CPT | Performed by: PSYCHIATRY & NEUROLOGY

## 2019-04-13 PROCEDURE — 94003 VENT MGMT INPAT SUBQ DAY: CPT

## 2019-04-13 PROCEDURE — 82803 BLOOD GASES ANY COMBINATION: CPT

## 2019-04-13 PROCEDURE — 93880 EXTRACRANIAL BILAT STUDY: CPT

## 2019-04-13 PROCEDURE — 6370000000 HC RX 637 (ALT 250 FOR IP): Performed by: PHYSICIAN ASSISTANT

## 2019-04-13 PROCEDURE — 6370000000 HC RX 637 (ALT 250 FOR IP): Performed by: NURSE PRACTITIONER

## 2019-04-13 PROCEDURE — 2100000001 HC CVICU R&B

## 2019-04-13 PROCEDURE — 6360000002 HC RX W HCPCS: Performed by: NURSE PRACTITIONER

## 2019-04-13 PROCEDURE — 70450 CT HEAD/BRAIN W/O DYE: CPT

## 2019-04-13 PROCEDURE — 94770 HC ETCO2 MONITOR DAILY: CPT

## 2019-04-13 PROCEDURE — 94640 AIRWAY INHALATION TREATMENT: CPT

## 2019-04-13 PROCEDURE — 80048 BASIC METABOLIC PNL TOTAL CA: CPT

## 2019-04-13 RX ADMIN — HEPARIN SODIUM 5000 UNITS: 5000 INJECTION INTRAVENOUS; SUBCUTANEOUS at 05:53

## 2019-04-13 RX ADMIN — ALBUTEROL SULFATE 2.5 MG: 2.5 SOLUTION RESPIRATORY (INHALATION) at 03:15

## 2019-04-13 RX ADMIN — FENTANYL CITRATE 50 MCG: 50 INJECTION INTRAMUSCULAR; INTRAVENOUS at 05:18

## 2019-04-13 RX ADMIN — Medication 500000 UNITS: at 11:54

## 2019-04-13 RX ADMIN — METOPROLOL TARTRATE 25 MG: 25 TABLET ORAL at 07:55

## 2019-04-13 RX ADMIN — ALBUTEROL SULFATE 2.5 MG: 2.5 SOLUTION RESPIRATORY (INHALATION) at 20:10

## 2019-04-13 RX ADMIN — SODIUM CHLORIDE: 9 INJECTION, SOLUTION INTRAVENOUS at 20:49

## 2019-04-13 RX ADMIN — FENTANYL CITRATE 50 MCG: 50 INJECTION INTRAMUSCULAR; INTRAVENOUS at 19:28

## 2019-04-13 RX ADMIN — POLYETHYLENE GLYCOL 3350 17 G: 17 POWDER, FOR SOLUTION ORAL at 09:23

## 2019-04-13 RX ADMIN — CEFTAROLINE FOSAMIL 400 MG: 400 POWDER, FOR SOLUTION INTRAVENOUS at 11:53

## 2019-04-13 RX ADMIN — MULTIPLE VITAMINS W/ MINERALS TAB 1 TABLET: TAB at 07:57

## 2019-04-13 RX ADMIN — Medication 500000 UNITS: at 20:46

## 2019-04-13 RX ADMIN — DESMOPRESSIN ACETATE 40 MG: 0.2 TABLET ORAL at 20:45

## 2019-04-13 RX ADMIN — ASPIRIN 81 MG 81 MG: 81 TABLET ORAL at 07:56

## 2019-04-13 RX ADMIN — FENTANYL CITRATE 50 MCG: 50 INJECTION INTRAMUSCULAR; INTRAVENOUS at 11:45

## 2019-04-13 RX ADMIN — FENTANYL CITRATE 50 MCG: 50 INJECTION INTRAMUSCULAR; INTRAVENOUS at 07:48

## 2019-04-13 RX ADMIN — FENTANYL CITRATE 50 MCG: 50 INJECTION INTRAMUSCULAR; INTRAVENOUS at 14:23

## 2019-04-13 RX ADMIN — ACETAMINOPHEN 650 MG: 325 TABLET ORAL at 05:54

## 2019-04-13 RX ADMIN — Medication 10 ML: at 09:23

## 2019-04-13 RX ADMIN — FAMOTIDINE 20 MG: 20 TABLET, FILM COATED ORAL at 09:23

## 2019-04-13 RX ADMIN — HYDRALAZINE HYDROCHLORIDE 5 MG: 20 INJECTION INTRAMUSCULAR; INTRAVENOUS at 04:34

## 2019-04-13 RX ADMIN — AMIODARONE HYDROCHLORIDE 200 MG: 200 TABLET ORAL at 07:55

## 2019-04-13 RX ADMIN — FENTANYL CITRATE 50 MCG: 50 INJECTION INTRAMUSCULAR; INTRAVENOUS at 06:31

## 2019-04-13 RX ADMIN — Medication 15 ML: at 09:21

## 2019-04-13 RX ADMIN — INSULIN LISPRO 6 UNITS: 100 INJECTION, SOLUTION INTRAVENOUS; SUBCUTANEOUS at 18:22

## 2019-04-13 RX ADMIN — FENTANYL CITRATE 50 MCG: 50 INJECTION INTRAMUSCULAR; INTRAVENOUS at 16:15

## 2019-04-13 RX ADMIN — Medication 15 ML: at 20:45

## 2019-04-13 RX ADMIN — SODIUM CHLORIDE: 9 INJECTION, SOLUTION INTRAVENOUS at 06:08

## 2019-04-13 RX ADMIN — HEPARIN SODIUM 5000 UNITS: 5000 INJECTION INTRAVENOUS; SUBCUTANEOUS at 18:22

## 2019-04-13 RX ADMIN — HYDRALAZINE HYDROCHLORIDE 5 MG: 20 INJECTION INTRAMUSCULAR; INTRAVENOUS at 04:47

## 2019-04-13 RX ADMIN — ALBUTEROL SULFATE 2.5 MG: 2.5 SOLUTION RESPIRATORY (INHALATION) at 08:10

## 2019-04-13 RX ADMIN — INSULIN LISPRO 6 UNITS: 100 INJECTION, SOLUTION INTRAVENOUS; SUBCUTANEOUS at 12:46

## 2019-04-13 RX ADMIN — Medication 100 MG: at 09:23

## 2019-04-13 RX ADMIN — FENTANYL CITRATE 50 MCG: 50 INJECTION INTRAMUSCULAR; INTRAVENOUS at 21:36

## 2019-04-13 RX ADMIN — HYDRALAZINE HYDROCHLORIDE 5 MG: 20 INJECTION INTRAMUSCULAR; INTRAVENOUS at 04:18

## 2019-04-13 RX ADMIN — FENTANYL CITRATE 50 MCG: 50 INJECTION INTRAMUSCULAR; INTRAVENOUS at 18:31

## 2019-04-13 RX ADMIN — Medication 10 ML: at 20:46

## 2019-04-13 RX ADMIN — HEPARIN SODIUM 5000 UNITS: 5000 INJECTION INTRAVENOUS; SUBCUTANEOUS at 23:03

## 2019-04-13 RX ADMIN — OXYCODONE HYDROCHLORIDE AND ACETAMINOPHEN 2 TABLET: 5; 325 TABLET ORAL at 21:37

## 2019-04-13 RX ADMIN — Medication 500000 UNITS: at 09:23

## 2019-04-13 RX ADMIN — METOPROLOL TARTRATE 25 MG: 25 TABLET ORAL at 20:45

## 2019-04-13 RX ADMIN — INSULIN LISPRO 2 UNITS: 100 INJECTION, SOLUTION INTRAVENOUS; SUBCUTANEOUS at 20:53

## 2019-04-13 RX ADMIN — ALBUTEROL SULFATE 2.5 MG: 2.5 SOLUTION RESPIRATORY (INHALATION) at 14:25

## 2019-04-13 RX ADMIN — HYDRALAZINE HYDROCHLORIDE 5 MG: 20 INJECTION INTRAMUSCULAR; INTRAVENOUS at 06:21

## 2019-04-13 RX ADMIN — ACETAMINOPHEN 650 MG: 325 TABLET ORAL at 11:43

## 2019-04-13 RX ADMIN — METOCLOPRAMIDE 5 MG: 5 INJECTION, SOLUTION INTRAMUSCULAR; INTRAVENOUS at 05:53

## 2019-04-13 RX ADMIN — INSULIN LISPRO 6 UNITS: 100 INJECTION, SOLUTION INTRAVENOUS; SUBCUTANEOUS at 09:27

## 2019-04-13 RX ADMIN — Medication 500000 UNITS: at 18:23

## 2019-04-13 RX ADMIN — CLOPIDOGREL 75 MG: 75 TABLET, FILM COATED ORAL at 07:56

## 2019-04-13 ASSESSMENT — PULMONARY FUNCTION TESTS
PIF_VALUE: 25
PIF_VALUE: 23
PIF_VALUE: 26
PIF_VALUE: 24
PIF_VALUE: 30
PIF_VALUE: 28
PIF_VALUE: 24

## 2019-04-13 NOTE — PROGRESS NOTES
Writer perfect serve message Dr. Bridgette Gutierres with Neurology and notified patient is intubated, agitated, hypertensive and tachycardia. But on sedation at this time as patient has been a holiday sedation x 2 days. Notified patient was on versed drip. Dr. Eusebio Guaman with CTS at the bedside and did want writer to ask Neurology if ok to restart sedation. Physician states he will let day shift RN Emy know. Writer did perfect serve message Blair Miller RN number.

## 2019-04-13 NOTE — PLAN OF CARE
Problem: OXYGENATION/RESPIRATORY FUNCTION  Goal: Patient will maintain patent airway  Outcome: Ongoing  Goal: Patient will achieve/maintain normal respiratory rate/effort  Description  Respiratory rate and effort will be within normal limits for the patient  Outcome: Ongoing     Problem: MECHANICAL VENTILATION  Goal: Patient will maintain patent airway  Outcome: Ongoing  Goal: Oral health is maintained or improved  Outcome: Ongoing  Goal: ET tube will be managed safely  Outcome: Ongoing  Goal: Ability to express needs and understand communication  Outcome: Ongoing  Goal: Mobility/activity is maintained at optimum level for patient  Outcome: Ongoing     Problem: Falls - Risk of:  Goal: Will remain free from falls  Description  Will remain free from falls  4/13/2019 0240 by Junnie Riedel, RN  Outcome: Ongoing  Goal: Absence of physical injury  Description  Absence of physical injury  4/13/2019 0240 by Junnie Riedel, RN  Outcome: Ongoing  Note:   Bed lock and in lowest position, side rails up x 2, bed alarm on, room free from clutter and patient remains free from falls and physical injury. Problem: Restraint Use - Nonviolent/Non-Self-Destructive Behavior:  Goal: Absence of restraint indications  Description  Absence of restraint indications  4/13/2019 0240 by Junnie Riedel, RN  Outcome: Ongoing  Note:   Patient's bilateral wrist restraints removed and at times patient agitated. Patient needs bilateral soft restraints to protect airway, lines and tubes.       Problem: SKIN INTEGRITY  Goal: Skin integrity is maintained or improved  4/13/2019 0821 by Jessie Moon RCP  Outcome: Ongoing  4/13/2019 0240 by Junnie Riedel, RN  Outcome: Ongoing  Note:   Patient turn every 2 hours, bilateral heels elevated off bed, writer applied new mepilex dressing over sacrum/coccyx, applied protective barrier cream.  Noted wounds see LDA's,      Problem: Cardiac Output - Decreased:  Goal: Cardiac output within specified parameters  Description  Cardiac output within specified parameters  4/13/2019 0240 by Glenda Nathan RN  Outcome: Ongoing  Goal: Hemodynamic stability will improve  Description  Hemodynamic stability will improve  4/13/2019 0240 by Glenda Nathan RN  Outcome: Ongoing  Note:   Vital signs monitored per floor routine via right femoral artline and vital signs remain stable.

## 2019-04-13 NOTE — PROGRESS NOTES
PULMONARY & CRITICAL CARE MEDICINE PROGRESS NOTE     Patient:  Adeel Frazier  MRN: [id number]  Admit date: 4/7/2019  Primary Care Physician: No primary care provider on file. Consulting Physician: Saleem Ramsey, *    SUBJECTIVE     CHIEF COMPLAINT/REASON FOR INITIAL CONSULT: cardiac arrest    BRIEF HISTORY & INTERVAL EVENTS:  The patient is a 46 y.o. male presented with cardiac arrest/STEMI, was found to have multivessel coronary artery disease and subsequently underwent emergent and reactive bypass graft. He was also found to have protruding screw from prior ORIF of left tibial fracture. Postoperative course has been complicated by development of acute kidney injury, transaminitis and fever and his antimicrobial therapy was changed to Ceftaroline to worsening kidney function. Patient is currently off sedation, he opens eyes when his name is called, however does not follow any other commands. The neurology service is planning to get MRI. He continues to be hemodynamically stable. His oxygen requirements is stable on the vent. He seems to be making adequate urine output, nephrology team is following.     REVIEW OF SYSTEMS:  Unobtainable from patient due to sedation/mechanical ventilation    OBJECTIVE     Ventilator Settings:  Vent Information  $Ventilation: $Subsequent Day  Ventilator Started: Yes  Skin Assessment: Clean, dry, & intact  Equipment Changed: HME  Vent Type: Servo i  Vent Mode: PRVC  Vt Ordered: 660 mL  Rate Set: (S) 14 bmp  Pressure Support: 10 cmH20  FiO2 : 50 %  Sensitivity: 5  PEEP/CPAP: (S) 5  I Time/ I Time %: 0.85 s  Humidification Source: Heated wire  Humidification Temp: 36.9  Humidification Temp Measured: 36.8  Circuit Condensation: Drained  Nitric Oxide/Epoprostenol In Use?: No    VITAL SIGNS:   LAST-  BP (!) 126/52   Pulse 88   Temp 100.1 °F (37.8 °C) (Oral)   Resp 19   Ht 5' 8\" (1.727 m)   Wt 183 lb 3.2 oz (83.1 kg)   SpO2 100%   BMI 27.86 kg/m²   8-24 HR RANGE-  TEMP Temp Av.1 °F (37.8 °C)  Min: 100.1 °F (37.8 °C)  Max: 100.1 °F (89.2 °C)   BP Systolic (08YBT), NIS:968 , Min:98 , ROMERO:647      Diastolic (89KOA), FJB:14, Min:39, Max:61     PULSE Pulse  Av.8  Min: 71  Max: 103   RR Resp  Av.5  Min: 18  Max: 19   O2 SAT SpO2  Av.9 %  Min: 97 %  Max: 100 %   OXYGEN DELIVERY No data recorded     SYSTEMIC EXAMINATION:   General appearance - Mechanically ventilated  Mental status - Encephalopathic  Eyes - pupils equal and reactive, sclera anicteric  Mouth - mucous membranes moist, pharynx normal without lesions  Neck - supple, no significant adenopathy, carotids upstroke normal bilaterally, no bruits  Chest - Breath sounds bilaterally were dimnished to auscultation at bases. There were no wheezes, rhonchi or rales.   There is no intercostal recession or use of accessory muscles  Heart - normal rate, regular rhythm, normal S1, S2, no murmurs, rubs, clicks or gallops  Abdomen - soft, nontender, nondistended, no masses or organomegaly  Neurological - DTR's normal and symmetric, motor and sensory grossly normal bilaterally  Extremities - peripheral pulses normal, no pedal edema, no clubbing or cyanosis  Skin - normal coloration and turgor, no rashes, no suspicious skin lesions noted     DATA REVIEW     Medications: Current Inpatient  Scheduled Meds:   nystatin  5 mL Oral 4x Daily    heparin (porcine)  5,000 Units Subcutaneous 3 times per day    famotidine  20 mg Oral Daily    ceftaroline fosamil (TEFLARO) IVPB  400 mg Intravenous Q12H    aspirin  81 mg Oral Daily    docusate  100 mg Oral Daily    [Held by provider] furosemide  40 mg Intravenous BID    dextrose  12.5 g Intravenous Once    albuterol  2.5 mg Nebulization Q6H    sodium chloride flush  10 mL Intravenous 2 times per day    sodium chloride flush  10 mL Intravenous 2 times per day    polyethylene glycol  17 g Oral Daily    metoprolol tartrate  25 mg Oral BID    amiodarone  200 mg Oral BID    toilet, aspiration precautions and bronchodilators  Continue with the Diuresis as per nephrology  Continue to monitor I/O with a goal of even/negative fluid balance  Recommend to continue tube feeds  Stress ulcer prophylaxis  Continue to monitor CBC, coagulation profile, transfuse as indicated  Chemical DVT prophylaxis  Antimicrobials reviewed; continue Ceftaroline  Glycemic control appropriate  Physical/occupational therapy    The patient remains critically ill with illness/injury that acutely impairs one or more vital organ systems, such that there is a high probability of imminent or life threatening deterioration in the patient's condition. Critical care time of greater than 30 minutes was spent (excluding procedures), in coordination of care during bedside rounds and discussion of patient care in detail, and recommendations of the team were adopted in the plan. Domingo Haskins M.D. Pulmonary and Critical Care Medicine           4/13/2019, 3:29 PM    Please note that this chart was generated using voice recognition Dragon dictation software. Although every effort was made to ensure the accuracy of this automated transcription, some errors in transcription may have occurred.

## 2019-04-13 NOTE — PROGRESS NOTES
Received call from RN at approximately 10:00am regarding concern for absent doppler signals in the RLE of the patient. She reports the leg does look a bit different than it used This is a patient who is recently underwent CABG for multivessel CAD and has recently arrested (PEA) several times with ROSC. The patient is now POD#6 from his emergent CABG and unfortunately he has been off sedation for >24 hrs and is exhibiting minimal movement or response to commands. Due to mental status, the patient will be going for a follow up CT Head today to re-evaluate ischemic changes concerning for infarction in the left parietal region. During my exam with the RN present we were able to locate doppler signals in the distal RLE including PT/DP although they were poor, monophasic signals. Plan at this time was to continue to observe and hold off on further peripheral vascular workup.

## 2019-04-13 NOTE — PROGRESS NOTES
NEPHROLOGY  PROGRESS NOTE      SUBJECTIVE      Following for ALMITA secondary to ATN with recent emergent CABG following cardiac arrest.  Spiked a fever yesterday. Still with fever today. Blood pressures 120-150. Received 1 dose of IV Lasix 80 mg yesterday. Diuretics have been on hold since. Receiving IVF NS @ 75/hr. Urine output 2675 last 24 hours. Creatinine down to 1.69, from 1.99  Vanco Tr 22.2  Renal US negative for hydronephrosis. Normal renal cortical echogenicity. US does suggest gallbladder sludge and wall thickening. OBJECTIVE      VS: BP (!) 162/61   Pulse 99   Temp 100.1 °F (37.8 °C) (Oral)   Resp 18   Ht 5' 8\" (1.727 m)   Wt 183 lb 3.2 oz (83.1 kg)   SpO2 98%   BMI 27.86 kg/m²   MAXIMUM TEMPERATURE OVER 24HRS:  Temp (24hrs), Av.1 °F (37.8 °C), Min:100.1 °F (37.8 °C), Max:100.1 °F (37.8 °C)    24HR BLOOD PRESSURE RANGE:  Systolic (38WCO), WLF:094 , Min:98 , ERL:223   ; Diastolic (61FGY), RZZ:89, Min:39, Max:61    24HR INTAKE/OUTPUT:      Intake/Output Summary (Last 24 hours) at 2019 1047  Last data filed at 2019 0700  Gross per 24 hour   Intake 2333 ml   Output 2635 ml   Net -302 ml     WEIGHT :  Patient Vitals for the past 96 hrs (Last 3 readings):   Weight   19 0600 183 lb 3.2 oz (83.1 kg)   19 0513 179 lb 9 oz (81.4 kg)   04/10/19 0609 178 lb 9.6 oz (81 kg)       PHYSICAL EXAM      GENERAL APPEARANCE:Awake, alert, in no acute distress  SKIN: warm and dry, no rash or erythema  EYES: conjunctivae normal and sclera anicteric  ENT: no thrush no pharyngeal congestion   NECK:   No JVD. No carotid bruits and no carotid lymphadenopathy . PULMONARY: lungs are clear to auscultation. No Wheezing, no ronchi . CADRDIOVASCULAR: S1 and S2 normal NO S3 and NO S4 . No rubs , no murmur. ABDOMEN: soft nontender, bowel sounds present, no organomegaly, no ascites.      EXTREMITIES: no cyanosis, clubbing or edema     CURRENT MEDICATIONS        nystatin (MYCOSTATIN) 388274 UNIT/ML suspension 500,000 Units 4x Daily   heparin (porcine) injection 5,000 Units 3 times per day   famotidine (PEPCID) tablet 20 mg Daily   ceftaroline fosamil (TEFLARO) 400 mg in dextrose 5 % 50 mL IVPB Q12H   aspirin chewable tablet 81 mg Daily   docusate (COLACE) 50 MG/5ML liquid 100 mg Daily   [Held by provider] furosemide (LASIX) injection 40 mg BID   midazolam (VERSED) 100 mg in dextrose 5% 100 mL infusion Continuous   albuterol (PROVENTIL) nebulizer solution 2.5 mg Q6H PRN   dextrose injection 12.5 g Once   albuterol (PROVENTIL) nebulizer solution 2.5 mg Q6H   sodium chloride flush 0.9 % injection 10 mL 2 times per day   sodium chloride flush 0.9 % injection 10 mL PRN   magnesium hydroxide (MILK OF MAGNESIA) 400 MG/5ML suspension 30 mL Daily PRN   0.9 % sodium chloride infusion Continuous   sodium chloride flush 0.9 % injection 10 mL 2 times per day   sodium chloride flush 0.9 % injection 10 mL PRN   calcium chloride 1 g in sodium chloride 0.9 % 100 mL IVPB PRN   magnesium sulfate 1 g in dextrose 5% 100 mL IVPB PRN   potassium chloride 20 mEq/50 mL IVPB (Central Line) PRN   acetaminophen (TYLENOL) tablet 650 mg Q4H PRN   acetaminophen (TYLENOL) suppository 650 mg Q4H PRN   oxyCODONE-acetaminophen (PERCOCET) 5-325 MG per tablet 1 tablet Q4H PRN   Or    oxyCODONE-acetaminophen (PERCOCET) 5-325 MG per tablet 2 tablet Q4H PRN   fentaNYL (SUBLIMAZE) injection 25 mcg Q1H PRN   Or    fentaNYL (SUBLIMAZE) injection 50 mcg Q1H PRN   diphenhydrAMINE (BENADRYL) tablet 25 mg Nightly PRN   polyethylene glycol (GLYCOLAX) packet 17 g Daily   bisacodyl (DULCOLAX) suppository 10 mg Daily PRN   ondansetron (ZOFRAN) injection 4 mg Q8H PRN   metoprolol tartrate (LOPRESSOR) tablet 25 mg BID   amiodarone (CORDARONE) tablet 200 mg BID   chlorhexidine (PERIDEX) 0.12 % solution 15 mL BID   hydrALAZINE (APRESOLINE) injection 5 mg Q5 Min PRN   therapeutic multivitamin-minerals 1 tablet Daily with breakfast   atorvastatin (LIPITOR) Light Chains QNT 5. 81High   0.57 - 2.63 mg/dL Final 04/12/2019  1:32  [address]   Free [place]/Lambda Ratio 1.62            RADIOLOGY      Reviewed as available. Renal US  FINDINGS:       Kidneys:       The right kidney measures 12.5 cm in length and the left kidney measures 12.6   cm in length.       Kidneys demonstrate normal cortical echogenicity.  No evidence of   hydronephrosis or intrarenal stones.       There is 1.1 x 1.0 x 0.8 cm left renal cyst.           Other:       Partially imaged gallbladder sludge.  Apparent gallbladder wall thickening.           Impression   1. No evidence of hydronephrosis.  Normal renal cortical echogenicity.       2. Partially imaged gallbladder sludge.  Apparent gallbladder wall   thickening.  Consider further evaluation with dedicated right upper quadrant   ultrasound. ASSESSMENT    1. ALMITA secondary to ischemic ATN. (Normal Baseline) Likely septic. Creatinine down to 1.69  2. S/p CABG L-LAD, S-PDA, S-OM, S-Cx for multivessel disease on 4/7/19.   3. HYPERTENSION: Controlled. No pressor support    4. S/p cardiac arrest/PEA on admission  5. Respiratory Failure: Continues on vent support  6. Exposed orthopedic hardware right lateral knee. Likely infected hard ware   7. ? Gallbladder Sludge/wall thickening   8. ICMP with EF 15%      PLAN      1. Continue NS @ 75/hr   2. Hold diuretic today. PRN if needed. UO is excellent  3. Follow up labs ordered. 4. Patient needs HIDA scan. Will defer to primary team  5. Following along       Please do not hesitate to call with questions. Electronically signed by BRIDGETT Lugo on 4/13/2019 at 10:47 AM     Attending Physician Statement  I have discussed the care of Jonny Mullen, including pertinent history and exam findings,  with the CNP. I have reviewed the key elements of all parts of the encounter with the CNP. I agree with the assessment, plan and orders as documented by the resident.     Ronni Peraza MD, MRCP Geoffrey Pham, FACP   4/13/2019 1:09 PM    Nephrology [address] [place] [address] Drive

## 2019-04-13 NOTE — PROGRESS NOTES
Per son Linette Strickland, only family able to visit at this time. And friends Tom Watson and Poncho Arredondo.

## 2019-04-13 NOTE — PROGRESS NOTES
NEUROLOGY INPATIENT PROGRESS NOTE    4/13/2019         Subjective: Gage Correa is a  46 y.o. male admitted on 4/7/2019 with Cardiac arrest [facility]) [I46.9]    Briefly, this is a  46 y.o. male admitted on 4/7/2019 with cardiac arrest. Received CPR for 3 rounds and epi x2 before ROSC. Transferred to OR for CABG on 4/7. Post-operatively he was following commands. He was irritable and sedation was increased before weaning versed and precedex off on 4/22 but since then hasn't responded to commands and has not had spontaneous movements. EEG was done but not reported. CT showed scattered areas of encephalomalacia to bifrontal and high left parietal areas consistent with remote infarct/trauma, area of possible subacute infarct to left lower parietal region. MRI can't be done due to Pacemaker. EEG showed diffused slowing. Repeat CT was ordered. Today, no acute events overnight. No current facility-administered medications on file prior to encounter. No current outpatient medications on file prior to encounter. Allergies: Gage Correa has no allergies on file.     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 Kirill Burkett, [facility] GRAFT N/A 4/7/2019    EMERGENT CPR,  CORONARY ARTERY BYPASS X4, ON PUMP ,CHEST LEFT OPEN; SWAN, ANGELA PER ANESTHESIA performed by Dario Petty MD at [address] [facility] [place]      unsure if correct known tibial surgery    HARDWARE REMOVAL Right 04/07/2019    R Knee internal screw protruding through skin    RECONSTRUCTIVE REPAIR STERNAL N/A 4/8/2019 Intubated         NEUROLOGIC EXAMINATION  Physical Exam   Cardiovascular: Normal rate and regular rhythm. Pulmonary/Chest: Effort normal and breath sounds normal.   Abdominal: Soft. Bowel sounds are normal.   .  Neurologic Exam   General Examination  Level of consciousness: 9T  evidence of trauma No    Brain Stem Assessment  Pupillary response: sluggish  Corneal Reflex: Positive  Blink Reflex: None  Eye movement:   -Spontaneous : None  -Oculocephalic reflex : Positive   -Vestibulo-ocular reflex: Not done  -Cough reflex: positive  -Gag reflex: positive    motor responses  Movements. Sontaneous or to noxious stimuli  Posturing: Decorticate (Flexor) Decerebrate (Extensor) No  deep tendon reflexes normal and symmetrical  planatar relexes No  Involuntary movements (such as subtle signs of seizures and myoclonus) No  Asymmetry No                Lab Results:   CBC:   Recent Labs     04/11/19 0456 04/12/19  0534 04/12/19  0729 04/13/19  0546   WBC 10.2 12.4*  --  16.5*   HGB 10.1* 6.6* 8.7* 9.3*   * 122*  --  169     BMP:    Recent Labs     04/11/19 0456 04/12/19  0534 04/13/19  0546    142 136   K 3.6* 4.6 4.6   * 113* 108*   CO2 20 18* 18*   BUN 19 43* 48*   CREATININE 0.43* 1.99* 1.69*   GLUCOSE 207* 148* 287*         Lab Results   Component Value Date    ALT 3,818 (H) 04/12/2019    AST 3,077 (H) 04/12/2019    INR 1.2 04/13/2019    LABA1C 10.8 (H) 04/07/2019       No results found for: PHENYTOIN, PHENYTOIN, VALPROATE, CBMZ    IMAGING  CT as above     ASSESSMENT  Subconscious state (minimal conscious state)   No evidence of hypoxic brain injury     PLAN  1.) Continue current management.    2.) Neurochecks as per protocol     Consulted with Dr. Vasquez Spencer MD  PGY-2 Neurology Resident

## 2019-04-14 ENCOUNTER — APPOINTMENT (OUTPATIENT)
Dept: NUCLEAR MEDICINE | Age: 54
DRG: 165 | End: 2019-04-14
Payer: MEDICAID

## 2019-04-14 ENCOUNTER — APPOINTMENT (OUTPATIENT)
Dept: MRI IMAGING | Age: 54
DRG: 165 | End: 2019-04-14
Payer: MEDICAID

## 2019-04-14 ENCOUNTER — APPOINTMENT (OUTPATIENT)
Dept: GENERAL RADIOLOGY | Age: 54
DRG: 165 | End: 2019-04-14
Payer: MEDICAID

## 2019-04-14 LAB
-: ABNORMAL
ALBUMIN SERPL-MCNC: 2.7 G/DL (ref 3.5–5.2)
ALBUMIN/GLOBULIN RATIO: 1 (ref 1–2.5)
ALLEN TEST: ABNORMAL
ALP BLD-CCNC: 151 U/L (ref 40–129)
ALT SERPL-CCNC: 1843 U/L (ref 5–41)
AMORPHOUS: ABNORMAL
ANION GAP SERPL CALCULATED.3IONS-SCNC: 8 MMOL/L (ref 9–17)
AST SERPL-CCNC: 467 U/L
BACTERIA: ABNORMAL
BILIRUB SERPL-MCNC: 1.48 MG/DL (ref 0.3–1.2)
BILIRUBIN DIRECT: 0.85 MG/DL
BILIRUBIN URINE: NEGATIVE
BILIRUBIN, INDIRECT: 0.63 MG/DL (ref 0–1)
BUN BLDV-MCNC: 30 MG/DL (ref 6–20)
BUN/CREAT BLD: ABNORMAL (ref 9–20)
CALCIUM SERPL-MCNC: 8.1 MG/DL (ref 8.6–10.4)
CASTS UA: ABNORMAL /LPF (ref 0–8)
CHLORIDE BLD-SCNC: 112 MMOL/L (ref 98–107)
CO2: 22 MMOL/L (ref 20–31)
COLOR: ABNORMAL
CREAT SERPL-MCNC: 0.67 MG/DL (ref 0.7–1.2)
CREATININE URINE: 77 MG/DL (ref 39–259)
CRYSTALS, UA: ABNORMAL /HPF
EOSINOPHIL,URINE: NORMAL
EPITHELIAL CELLS UA: ABNORMAL /HPF (ref 0–5)
FIO2: 50
GFR AFRICAN AMERICAN: >60 ML/MIN
GFR NON-AFRICAN AMERICAN: >60 ML/MIN
GFR SERPL CREATININE-BSD FRML MDRD: ABNORMAL ML/MIN/{1.73_M2}
GFR SERPL CREATININE-BSD FRML MDRD: ABNORMAL ML/MIN/{1.73_M2}
GLOBULIN: ABNORMAL G/DL (ref 1.5–3.8)
GLUCOSE BLD-MCNC: 269 MG/DL (ref 75–110)
GLUCOSE BLD-MCNC: 270 MG/DL (ref 75–110)
GLUCOSE BLD-MCNC: 287 MG/DL (ref 70–99)
GLUCOSE BLD-MCNC: 296 MG/DL (ref 74–100)
GLUCOSE URINE: ABNORMAL
HCT VFR BLD CALC: 30.8 % (ref 40.7–50.3)
HEMOGLOBIN: 9.6 G/DL (ref 13–17)
INR BLD: 1.2
KETONES, URINE: NEGATIVE
LEUKOCYTE ESTERASE, URINE: NEGATIVE
MAGNESIUM: 2.2 MG/DL (ref 1.6–2.6)
MCH RBC QN AUTO: 29.9 PG (ref 25.2–33.5)
MCHC RBC AUTO-ENTMCNC: 31.2 G/DL (ref 28.4–34.8)
MCV RBC AUTO: 96 FL (ref 82.6–102.9)
MODE: ABNORMAL
MUCUS: ABNORMAL
NEGATIVE BASE EXCESS, ART: ABNORMAL (ref 0–2)
NITRITE, URINE: NEGATIVE
NRBC AUTOMATED: 0.1 PER 100 WBC
O2 DEVICE/FLOW/%: ABNORMAL
OTHER OBSERVATIONS UA: ABNORMAL
PATIENT TEMP: ABNORMAL
PDW BLD-RTO: 15 % (ref 11.8–14.4)
PH UA: 5 (ref 5–8)
PLATELET # BLD: 222 K/UL (ref 138–453)
PMV BLD AUTO: 12.2 FL (ref 8.1–13.5)
POC HCO3: 26.3 MMOL/L (ref 21–28)
POC O2 SATURATION: 95 % (ref 94–98)
POC PCO2 TEMP: ABNORMAL MM HG
POC PCO2: 46.1 MM HG (ref 35–48)
POC PH TEMP: ABNORMAL
POC PH: 7.37 (ref 7.35–7.45)
POC PO2 TEMP: ABNORMAL MM HG
POC PO2: 77.2 MM HG (ref 83–108)
POSITIVE BASE EXCESS, ART: 1 (ref 0–3)
POTASSIUM SERPL-SCNC: 4.5 MMOL/L (ref 3.7–5.3)
PROTEIN UA: ABNORMAL
PROTHROMBIN TIME: 12.9 SEC (ref 9–12)
RBC # BLD: 3.21 M/UL (ref 4.21–5.77)
RBC UA: ABNORMAL /HPF (ref 0–2)
RENAL EPITHELIAL, UA: ABNORMAL /HPF
SAMPLE SITE: ABNORMAL
SODIUM BLD-SCNC: 142 MMOL/L (ref 135–144)
SODIUM,UR: 48 MMOL/L
SPECIFIC GRAVITY UA: 1.02 (ref 1–1.03)
TCO2 (CALC), ART: 28 MMOL/L (ref 22–29)
TOTAL PROTEIN: 5.5 G/DL (ref 6.4–8.3)
TRICHOMONAS: ABNORMAL
TURBIDITY: ABNORMAL
URINE HGB: ABNORMAL
UROBILINOGEN, URINE: NORMAL
WBC # BLD: 17.4 K/UL (ref 3.5–11.3)
WBC UA: ABNORMAL /HPF (ref 0–5)
YEAST: ABNORMAL

## 2019-04-14 PROCEDURE — 2580000003 HC RX 258: Performed by: NURSE PRACTITIONER

## 2019-04-14 PROCEDURE — 82803 BLOOD GASES ANY COMBINATION: CPT

## 2019-04-14 PROCEDURE — 6360000002 HC RX W HCPCS: Performed by: THORACIC SURGERY (CARDIOTHORACIC VASCULAR SURGERY)

## 2019-04-14 PROCEDURE — 6360000002 HC RX W HCPCS: Performed by: FAMILY MEDICINE

## 2019-04-14 PROCEDURE — 94770 HC ETCO2 MONITOR DAILY: CPT

## 2019-04-14 PROCEDURE — 36415 COLL VENOUS BLD VENIPUNCTURE: CPT

## 2019-04-14 PROCEDURE — 2580000003 HC RX 258: Performed by: INTERNAL MEDICINE

## 2019-04-14 PROCEDURE — 6360000002 HC RX W HCPCS: Performed by: INTERNAL MEDICINE

## 2019-04-14 PROCEDURE — 2700000000 HC OXYGEN THERAPY PER DAY

## 2019-04-14 PROCEDURE — 71045 X-RAY EXAM CHEST 1 VIEW: CPT

## 2019-04-14 PROCEDURE — 70551 MRI BRAIN STEM W/O DYE: CPT

## 2019-04-14 PROCEDURE — 83735 ASSAY OF MAGNESIUM: CPT

## 2019-04-14 PROCEDURE — 6370000000 HC RX 637 (ALT 250 FOR IP): Performed by: FAMILY MEDICINE

## 2019-04-14 PROCEDURE — 94762 N-INVAS EAR/PLS OXIMTRY CONT: CPT

## 2019-04-14 PROCEDURE — 99024 POSTOP FOLLOW-UP VISIT: CPT | Performed by: PHYSICIAN ASSISTANT

## 2019-04-14 PROCEDURE — 85027 COMPLETE CBC AUTOMATED: CPT

## 2019-04-14 PROCEDURE — 82947 ASSAY GLUCOSE BLOOD QUANT: CPT

## 2019-04-14 PROCEDURE — 80048 BASIC METABOLIC PNL TOTAL CA: CPT

## 2019-04-14 PROCEDURE — 99291 CRITICAL CARE FIRST HOUR: CPT | Performed by: INTERNAL MEDICINE

## 2019-04-14 PROCEDURE — 6360000002 HC RX W HCPCS: Performed by: NURSE PRACTITIONER

## 2019-04-14 PROCEDURE — 94640 AIRWAY INHALATION TREATMENT: CPT

## 2019-04-14 PROCEDURE — 6370000000 HC RX 637 (ALT 250 FOR IP): Performed by: NURSE PRACTITIONER

## 2019-04-14 PROCEDURE — 85610 PROTHROMBIN TIME: CPT

## 2019-04-14 PROCEDURE — 80076 HEPATIC FUNCTION PANEL: CPT

## 2019-04-14 PROCEDURE — 6370000000 HC RX 637 (ALT 250 FOR IP): Performed by: PHYSICIAN ASSISTANT

## 2019-04-14 PROCEDURE — 6370000000 HC RX 637 (ALT 250 FOR IP): Performed by: INTERNAL MEDICINE

## 2019-04-14 PROCEDURE — 94003 VENT MGMT INPAT SUBQ DAY: CPT

## 2019-04-14 PROCEDURE — 6370000000 HC RX 637 (ALT 250 FOR IP): Performed by: THORACIC SURGERY (CARDIOTHORACIC VASCULAR SURGERY)

## 2019-04-14 PROCEDURE — 2100000001 HC CVICU R&B

## 2019-04-14 PROCEDURE — 99254 IP/OBS CNSLTJ NEW/EST MOD 60: CPT | Performed by: FAMILY MEDICINE

## 2019-04-14 RX ORDER — NALOXONE HYDROCHLORIDE 0.4 MG/ML
0.4 INJECTION, SOLUTION INTRAMUSCULAR; INTRAVENOUS; SUBCUTANEOUS PRN
Status: DISCONTINUED | OUTPATIENT
Start: 2019-04-14 | End: 2019-04-19

## 2019-04-14 RX ORDER — AMLODIPINE BESYLATE 5 MG/1
5 TABLET ORAL DAILY
Status: DISCONTINUED | OUTPATIENT
Start: 2019-04-14 | End: 2019-04-15

## 2019-04-14 RX ORDER — INSULIN GLARGINE 100 [IU]/ML
20 INJECTION, SOLUTION SUBCUTANEOUS DAILY
Status: DISCONTINUED | OUTPATIENT
Start: 2019-04-14 | End: 2019-04-19

## 2019-04-14 RX ORDER — METOPROLOL TARTRATE 50 MG/1
50 TABLET, FILM COATED ORAL 2 TIMES DAILY
Status: DISCONTINUED | OUTPATIENT
Start: 2019-04-14 | End: 2019-04-16

## 2019-04-14 RX ORDER — PROPOFOL 10 MG/ML
10 INJECTION, EMULSION INTRAVENOUS
Status: DISCONTINUED | OUTPATIENT
Start: 2019-04-14 | End: 2019-04-19

## 2019-04-14 RX ORDER — FAMOTIDINE 20 MG/1
20 TABLET, FILM COATED ORAL 2 TIMES DAILY
Status: DISCONTINUED | OUTPATIENT
Start: 2019-04-14 | End: 2019-04-25 | Stop reason: HOSPADM

## 2019-04-14 RX ADMIN — AMLODIPINE BESYLATE 5 MG: 5 TABLET ORAL at 10:08

## 2019-04-14 RX ADMIN — HYDRALAZINE HYDROCHLORIDE 5 MG: 20 INJECTION INTRAMUSCULAR; INTRAVENOUS at 05:24

## 2019-04-14 RX ADMIN — HYDRALAZINE HYDROCHLORIDE 5 MG: 20 INJECTION INTRAMUSCULAR; INTRAVENOUS at 05:20

## 2019-04-14 RX ADMIN — FAMOTIDINE 20 MG: 20 TABLET, FILM COATED ORAL at 21:26

## 2019-04-14 RX ADMIN — Medication 500000 UNITS: at 21:26

## 2019-04-14 RX ADMIN — OXYCODONE HYDROCHLORIDE AND ACETAMINOPHEN 2 TABLET: 5; 325 TABLET ORAL at 19:41

## 2019-04-14 RX ADMIN — INSULIN LISPRO 4 UNITS: 100 INJECTION, SOLUTION INTRAVENOUS; SUBCUTANEOUS at 21:39

## 2019-04-14 RX ADMIN — CLOPIDOGREL 75 MG: 75 TABLET, FILM COATED ORAL at 09:37

## 2019-04-14 RX ADMIN — Medication 500000 UNITS: at 09:37

## 2019-04-14 RX ADMIN — OXYCODONE HYDROCHLORIDE AND ACETAMINOPHEN 2 TABLET: 5; 325 TABLET ORAL at 09:38

## 2019-04-14 RX ADMIN — CEFTAROLINE FOSAMIL 400 MG: 400 POWDER, FOR SOLUTION INTRAVENOUS at 00:41

## 2019-04-14 RX ADMIN — Medication 10 ML: at 09:39

## 2019-04-14 RX ADMIN — FENTANYL CITRATE 50 MCG: 50 INJECTION INTRAMUSCULAR; INTRAVENOUS at 19:41

## 2019-04-14 RX ADMIN — Medication 15 ML: at 09:46

## 2019-04-14 RX ADMIN — HYDRALAZINE HYDROCHLORIDE 10 MG: 20 INJECTION INTRAMUSCULAR; INTRAVENOUS at 05:38

## 2019-04-14 RX ADMIN — FENTANYL CITRATE 50 MCG: 50 INJECTION INTRAMUSCULAR; INTRAVENOUS at 01:32

## 2019-04-14 RX ADMIN — POLYETHYLENE GLYCOL 3350 17 G: 17 POWDER, FOR SOLUTION ORAL at 09:37

## 2019-04-14 RX ADMIN — SODIUM CHLORIDE: 9 INJECTION, SOLUTION INTRAVENOUS at 09:58

## 2019-04-14 RX ADMIN — ALBUTEROL SULFATE 2.5 MG: 2.5 SOLUTION RESPIRATORY (INHALATION) at 13:32

## 2019-04-14 RX ADMIN — MULTIPLE VITAMINS W/ MINERALS TAB 1 TABLET: TAB at 09:37

## 2019-04-14 RX ADMIN — Medication 500000 UNITS: at 17:17

## 2019-04-14 RX ADMIN — INSULIN LISPRO 6 UNITS: 100 INJECTION, SOLUTION INTRAVENOUS; SUBCUTANEOUS at 09:17

## 2019-04-14 RX ADMIN — ASPIRIN 81 MG 81 MG: 81 TABLET ORAL at 09:37

## 2019-04-14 RX ADMIN — ALBUTEROL SULFATE 2.5 MG: 2.5 SOLUTION RESPIRATORY (INHALATION) at 07:58

## 2019-04-14 RX ADMIN — DESMOPRESSIN ACETATE 40 MG: 0.2 TABLET ORAL at 21:25

## 2019-04-14 RX ADMIN — Medication 500000 UNITS: at 12:48

## 2019-04-14 RX ADMIN — ALBUTEROL SULFATE 2.5 MG: 2.5 SOLUTION RESPIRATORY (INHALATION) at 19:33

## 2019-04-14 RX ADMIN — HEPARIN SODIUM 5000 UNITS: 5000 INJECTION INTRAVENOUS; SUBCUTANEOUS at 06:54

## 2019-04-14 RX ADMIN — CEFTAROLINE FOSAMIL 400 MG: 400 POWDER, FOR SOLUTION INTRAVENOUS at 23:57

## 2019-04-14 RX ADMIN — Medication 15 ML: at 21:24

## 2019-04-14 RX ADMIN — INSULIN LISPRO 6 UNITS: 100 INJECTION, SOLUTION INTRAVENOUS; SUBCUTANEOUS at 14:12

## 2019-04-14 RX ADMIN — Medication 100 MG: at 09:37

## 2019-04-14 RX ADMIN — OXYCODONE HYDROCHLORIDE AND ACETAMINOPHEN 2 TABLET: 5; 325 TABLET ORAL at 14:21

## 2019-04-14 RX ADMIN — FENTANYL CITRATE 50 MCG: 50 INJECTION INTRAMUSCULAR; INTRAVENOUS at 06:19

## 2019-04-14 RX ADMIN — ALBUTEROL SULFATE 2.5 MG: 2.5 SOLUTION RESPIRATORY (INHALATION) at 03:13

## 2019-04-14 RX ADMIN — ENOXAPARIN SODIUM 40 MG: 40 INJECTION SUBCUTANEOUS at 13:00

## 2019-04-14 RX ADMIN — METOPROLOL TARTRATE 50 MG: 25 TABLET ORAL at 09:38

## 2019-04-14 RX ADMIN — Medication 5 MG/HR: at 03:25

## 2019-04-14 RX ADMIN — FAMOTIDINE 20 MG: 20 TABLET, FILM COATED ORAL at 09:37

## 2019-04-14 RX ADMIN — Medication 50 MCG/HR: at 09:16

## 2019-04-14 RX ADMIN — Medication 10 ML: at 21:25

## 2019-04-14 RX ADMIN — FENTANYL CITRATE 50 MCG: 50 INJECTION INTRAMUSCULAR; INTRAVENOUS at 02:57

## 2019-04-14 RX ADMIN — FENTANYL CITRATE 50 MCG: 50 INJECTION INTRAMUSCULAR; INTRAVENOUS at 04:39

## 2019-04-14 RX ADMIN — INSULIN GLARGINE 20 UNITS: 100 INJECTION, SOLUTION SUBCUTANEOUS at 10:08

## 2019-04-14 RX ADMIN — METOPROLOL TARTRATE 50 MG: 25 TABLET ORAL at 21:25

## 2019-04-14 RX ADMIN — CEFTAROLINE FOSAMIL 400 MG: 400 POWDER, FOR SOLUTION INTRAVENOUS at 12:47

## 2019-04-14 ASSESSMENT — PULMONARY FUNCTION TESTS
PIF_VALUE: 25
PIF_VALUE: 21
PIF_VALUE: 23
PIF_VALUE: 21
PIF_VALUE: 28
PIF_VALUE: 25
PIF_VALUE: 25

## 2019-04-14 ASSESSMENT — PAIN SCALES - GENERAL
PAINLEVEL_OUTOF10: 0
PAINLEVEL_OUTOF10: 7
PAINLEVEL_OUTOF10: 7
PAINLEVEL_OUTOF10: 0
PAINLEVEL_OUTOF10: 0
PAINLEVEL_OUTOF10: 7

## 2019-04-14 NOTE — PLAN OF CARE
Problem: OXYGENATION/RESPIRATORY FUNCTION  Goal: Patient will maintain patent airway  Outcome: Ongoing  Goal: Patient will achieve/maintain normal respiratory rate/effort  Description  Respiratory rate and effort will be within normal limits for the patient  Outcome: Ongoing     Problem: MECHANICAL VENTILATION  Goal: Patient will maintain patent airway  Outcome: Ongoing  Goal: Oral health is maintained or improved  Outcome: Ongoing  Goal: ET tube will be managed safely  Outcome: Ongoing  Goal: Ability to express needs and understand communication  Outcome: Ongoing  Goal: Mobility/activity is maintained at optimum level for patient  Outcome: Ongoing     Problem: SKIN INTEGRITY  Goal: Skin integrity is maintained or improved  4/14/2019 0811 by Edison Hughes RCP  Outcome: Ongoing  4/13/2019 2138 by Angelica Argueta RN  Note:   Heels elevated while up in chair, heels elevated while in bed, reposition every two hours while in bed  Monitor pressure points frequently and assess for any signs of redness

## 2019-04-14 NOTE — PROGRESS NOTES
BRONCHOSPASM/BRONCHOCONSTRICTION     [x]         IMPROVE AERATION/BREATH SOUNDS  [x]   ADMINISTER BRONCHODILATOR THERAPY AS APPROPRIATE  [x]   ASSESS BREATH SOUNDS  []   IMPLEMENT AEROSOL/MDI PROTOCOL  [x]   PATIENT EDUCATION AS NEEDED      MECHANICAL VENTILATION     [x]  PROVIDE OPTIMAL VENTILATION  [x]   ASSESS FOR EXTUBATION READINESS  [x]   ASSESS FOR WEANING READINESS  [x]  EXTUBATE AS TOLERATED  [x]  IMPLEMENT ADULT MECHANICAL VENTILATION PROTOCOL  [x]  MAINTAIN ADEQUATE OXYGENATION  [x]  PERFORM SPONTANEOUS WEANING TRIAL AS TOLERATED

## 2019-04-14 NOTE — PROGRESS NOTES
PULMONARY & CRITICAL CARE MEDICINE PROGRESS NOTE     Patient:  Michelle Gallego  MRN: [id number]  Admit date: 2019  Primary Care Physician: No primary care provider on file. Consulting Physician: Ford Bean, *    SUBJECTIVE     CHIEF COMPLAINT/REASON FOR INITIAL CONSULT: cardiac arrest    BRIEF HISTORY & INTERVAL EVENTS:  The patient is a 46 y.o. male presented with cardiac arrest/STEMI, was found to have multivessel coronary artery disease and subsequently underwent emergent and reactive bypass graft. He was also found to have protruding screw from prior ORIF of left tibial fracture. Postoperative course has been complicated by development of acute kidney injury, transaminitis and fever and his antimicrobial therapy was changed to Ceftaroline to worsening kidney function. Patient is currently on Versed and fentanyl infusions for sedation. The neurology service is planning to get MRI. He continues to be hemodynamically stable. His oxygen requirements is stable on the vent. He seems to be making adequate urine output, nephrology team is following.     REVIEW OF SYSTEMS:  Unobtainable from patient due to sedation/mechanical ventilation    OBJECTIVE     Ventilator Settings:  Vent Information  $Ventilation: $Subsequent Day  Ventilator Started: Yes  Skin Assessment: Clean, dry, & intact  Equipment Changed: HME  Vent Type: Servo i  Vent Mode: PRVC  Vt Ordered: 660 mL  Rate Set: 14 bmp  Pressure Support: 10 cmH20  FiO2 : 50 %  Sensitivity: 5  PEEP/CPAP: 5  I Time/ I Time %: 0.85 s  Humidification Source: Heated wire  Humidification Temp: 37  Humidification Temp Measured: 36.9  Circuit Condensation: Drained  Nitric Oxide/Epoprostenol In Use?: No    VITAL SIGNS:   LAST-  BP (!) 100/51   Pulse 80   Temp 97.7 °F (36.5 °C) (Oral)   Resp 14   Ht 5' 8\" (1.727 m)   Wt 180 lb 1.9 oz (81.7 kg)   SpO2 99%   BMI 27.39 kg/m²   8-24 HR RANGE-  TEMP Temp  Av.9 °F (37.2 °C)  Min: 97.7 °F (36.5 °C)  Max: 100 °F (44.9 °C)   BP Systolic (01APN), ZEM:722 , Min:97 , ZFT:642      Diastolic (46EGG), QKK:79, Min:51, Max:88     PULSE Pulse  Av  Min: 71  Max: 103   RR Resp  Avg: 15  Min: 14  Max: 16   O2 SAT SpO2  Av %  Min: 98 %  Max: 100 %   OXYGEN DELIVERY No data recorded     SYSTEMIC EXAMINATION:   General appearance - Mechanically ventilated  Mental status - Encephalopathic  Eyes - pupils equal and reactive, sclera anicteric  Mouth - mucous membranes moist, pharynx normal without lesions  Neck - supple, no significant adenopathy, carotids upstroke normal bilaterally, no bruits  Chest - Breath sounds bilaterally were dimnished to auscultation at bases. There were no wheezes, rhonchi or rales.   There is no intercostal recession or use of accessory muscles  Heart - normal rate, regular rhythm, normal S1, S2, no murmurs, rubs, clicks or gallops  Abdomen - soft, nontender, nondistended, no masses or organomegaly  Neurological - DTR's normal and symmetric, motor and sensory grossly normal bilaterally  Extremities - peripheral pulses normal, no pedal edema, no clubbing or cyanosis  Skin - normal coloration and turgor, no rashes, no suspicious skin lesions noted     DATA REVIEW     Medications: Current Inpatient  Scheduled Meds:   metoprolol tartrate  50 mg Oral BID    insulin glargine  20 Units Subcutaneous Daily    amLODIPine  5 mg Oral Daily    insulin lispro  0-12 Units Subcutaneous Q6H    famotidine  20 mg Oral BID    enoxaparin  40 mg Subcutaneous Daily    nystatin  5 mL Oral 4x Daily    ceftaroline fosamil (TEFLARO) IVPB  400 mg Intravenous Q12H    aspirin  81 mg Oral Daily    docusate  100 mg Oral Daily    dextrose  12.5 g Intravenous Once    albuterol  2.5 mg Nebulization Q6H    sodium chloride flush  10 mL Intravenous 2 times per day    sodium chloride flush  10 mL Intravenous 2 times per day    polyethylene glycol  17 g Oral Daily    chlorhexidine  15 mL Mouth/Throat BID    therapeutic multivitamin-minerals  1 tablet Oral Daily with breakfast    atorvastatin  40 mg Oral Nightly    clopidogrel  75 mg Oral Daily     Continuous Infusions:   fentaNYL 50 mcg/hr (04/14/19 0916)    midazolam 5 mg/hr (04/14/19 0325)    sodium chloride 20 mL/hr (04/14/19 1141)    dextrose       INPUT/OUTPUT:  In: 3078.7 [I.V.:1771.7; NG/GT:1307]  Out: 6894 [Urine:3185; Drains:100]    LABS:-  ABGs:   No results found for: PH, PCO2, PO2, HCO3, O2SAT  No results for input(s): PHART, PO2ART, VCK8HGJ, EJF1JGK, BEART, B4KTGDHA in the last 72 hours. Lab Results   Component Value Date    POCPH 7.365 04/14/2019    POCPCO2 46.1 04/14/2019    POCPO2 77.2 (L) 04/14/2019    POCHCO3 26.3 04/14/2019    QEMA4QJU 95 04/14/2019     CBC:   Recent Labs     04/12/19  0534 04/12/19  0729 04/13/19  0546 04/14/19  0517   WBC 12.4*  --  16.5* 17.4*   HGB 6.6* 8.7* 9.3* 9.6*   HCT 20.7* 27.4* 29.1* 30.8*   MCV 97.6  --  94.5 96.0   *  --  169 222   RBC 2.12*  --  3.08* 3.21*   MCH 31.1  --  30.2 29.9   MCHC 31.9  --  32.0 31.2   RDW 14.3  --  14.6* 15.0*     BMP:   Recent Labs     04/12/19  0534 04/13/19  0546 04/14/19  0517    136 142   K 4.6 4.6 4.5   * 108* 112*   CO2 18* 18* 22   BUN 43* 48* 30*   CREATININE 1.99* 1.69* 0.67*   GLUCOSE 148* 287* 287*     Liver Function Test:   Recent Labs     04/14/19  0517   PROT 5.5*   LABALBU 2.7*   ALT 1,843*   *   ALKPHOS 151*   BILITOT 1.48*     Amylase/Lipase:  Recent Labs     04/12/19  1124   AMYLASE 78   LIPASE 40     Coagulation Profile:   Recent Labs     04/12/19  0534 04/13/19  0546 04/14/19  0517   INR 1.4 1.2 1.2   PROTIME 14.7* 12.7* 12.9*     Cardiac Enzymes:  No results for input(s): CKTOTAL, CKMB, CKMBINDEX, TROPONINI in the last 72 hours.   Lactic Acid:  No results found for: LACTA  BNP:   No results found for: BNP  D-Dimer:  No results found for: DDIMER  Others:   No results found for: TSH, G2NPCZJ, K9OZOAY, THYROIDAB, FT3, T4FREE  No results found for: YOSELYN,

## 2019-04-14 NOTE — CONSULTS
reactive to light. No scleral icterus. Neck: No JVD present. No thyromegaly present. Cardiovascular: Normal rate, regular rhythm, normal heart sounds and intact distal pulses. No murmur heard. Pulmonary/Chest: Breath sounds normal. He is intubated. He has no wheezes. He has no rales. sternotomy wound . Chest tube in place    Abdominal: Soft. Bowel sounds are normal. He exhibits no distension and no mass. Genitourinary:   Genitourinary Comments: Tate cathter in place with good urine output . Lymphadenopathy:     He has no cervical adenopathy. Neurological: He displays normal reflexes. He exhibits normal muscle tone. Skin: No rash noted. He is not diaphoretic. No erythema. Nursing note and vitals reviewed.       Laboratory findings:    Hematology:  Recent Labs     04/14/19  0517 04/13/19  0546 04/12/19  0729 04/12/19  0534   WBC 17.4* 16.5*  --  12.4*   HGB 9.6* 9.3* 8.7* 6.6*   HCT 30.8* 29.1* 27.4* 20.7*   MCV 96.0 94.5  --  97.6    169  --  122*       Chemistry:  Lab Results   Component Value Date     04/14/2019    K 4.5 04/14/2019     (H) 04/14/2019    CO2 22 04/14/2019    BUN 30 (H) 04/14/2019    CREATININE 0.67 (L) 04/14/2019    GLUCOSE 287 (H) 04/14/2019    CALCIUM 8.1 (L) 04/14/2019    PROT 4.3 (L) 04/12/2019    LABALBU 2.7 (L) 04/12/2019    BILITOT 1.90 (H) 04/12/2019    ALKPHOS 71 04/12/2019    AST 3,077 (H) 04/12/2019    ALT 3,818 (H) 04/12/2019    LABGLOM >60 04/14/2019    GFRAA >60 04/14/2019    GLOB NOT REPORTED 04/12/2019     Lab Results   Component Value Date    LABALBU 2.7 (L) 04/12/2019     Lab Results   Component Value Date    LABA1C 10.8 (H) 04/07/2019     Lab Results   Component Value Date     04/07/2019     No results found for: TSHFT4, TSH    Magnesium:   Lab Results   Component Value Date    MG 2.2 04/14/2019     Phosphorus: No results found for: PHOS  Ionized Calcium:   Lab Results   Component Value Date    CAION 0.97 04/09/2019      Last 3 Blood Glucose:   Recent Labs     04/12/19  0534 04/13/19  0546 04/14/19  0517   GLUCOSE 148* 287* 287*      Urinalysis:   PT/INR:    Lab Results   Component Value Date    PROTIME 12.9 04/14/2019    INR 1.2 04/14/2019     PTT:    Lab Results   Component Value Date    APTT 25.7 04/07/2019     Lab Results   Component Value Date    LABA1C 10.8 (H) 04/07/2019       Clinical data and imaging : 4/12/19   CXR 4/10/19   Cardiomegaly and bilateral pulmonary congestion/edema that is similar   compared to prior.       Bibasilar effusions and stable endotracheal tube and gastric tube. CT head 4/12/19   Scattered areas of encephalomalacia, bifrontal and high left parietal areas   consistent with remote infarct.  There is hypodensity in the left lower   parietal region possibly a subacute infarct.  Chronic microvascular changes   are noted.  A punctate density is noted adjacent to the encephalomalacia in   the left frontal lobe, favoring dystrophic calcification over petechial   hemorrhage.         US renal 4/12/19   1. No evidence of hydronephrosis.  Normal renal cortical echogenicity.       2. Partially imaged gallbladder sludge.  Apparent gallbladder wall   thickening.  Consider further evaluation with dedicated right upper quadrant   ultrasound.           Echo 4/12/19   Left ventricle is normal in size. Global left ventricular systolic function  is mildly reduced. Calculated ejection fraction is 40. Left ventricular wall thickness is mildly increased. Grade II diastolic  dysfunction  Normal valvular function      Cardiac cath 4/7/19    1. Multivessel coronary artery disease.   2. Severe LV systolic dysfunction.   3. Mild aortic insufficiency   4. Distal aorta 40% stenosis.   5. Occluded left external iliac artery.   6. Severe diffuse disease of right iliac artery.  Unable to pass IABP or   Impella        Assessment and Plan   Principal Problem:    Cardiac arrest [facility])  Active Problems:    Acute ST elevation myocardial

## 2019-04-14 NOTE — PROGRESS NOTES
Occupational Therapy    Occupational Therapy Not Seen Note    DATE: 2019  Name: Carlos Vance  : 1965  MRN: [id number]    Patient not available for Occupational Therapy due to: Other: Int/not actively following commands at this time. Next Scheduled Treatment: Attempt on  as appropriate.     Electronically signed by Aurora Hand OT on 2019 at 9:29 AM

## 2019-04-14 NOTE — PROGRESS NOTES
NEPHROLOGY  PROGRESS NOTE      SUBJECTIVE      Following for ALMITA secondary to ATN with recent emergent CABG following cardiac arrest.      Has protruding right tibial orthopedic screw from previous ORIF. ID following. No fevers during night. On Vanco  Renal US with gallbladder sludge. ALT and AST elevated. Patient is on amiodarone. HIDA scan ordered. Receiving IVF NS @ 75/hr. Urine output 3935 last 24 hours. Down 3kg from yesterday. Diuretics held yesterday. Creatinine down to 0.67 from 1.99. OBJECTIVE      VS: BP (!) 145/61   Pulse 94   Temp 97.7 °F (36.5 °C) (Oral)   Resp 14   Ht 5' 8\" (1.727 m)   Wt 180 lb 1.9 oz (81.7 kg)   SpO2 98%   BMI 27.39 kg/m²   MAXIMUM TEMPERATURE OVER 24HRS:  Temp (24hrs), Av.5 °F (37.5 °C), Min:97.7 °F (36.5 °C), Max:101.3 °F (38.5 °C)    24HR BLOOD PRESSURE RANGE:  Systolic (34YPQ), CFE:031 , Min:108 , RTK:509   ; Diastolic (26RMB), NXH:86, Min:46, Max:66    24HR INTAKE/OUTPUT:      Intake/Output Summary (Last 24 hours) at 2019 0726  Last data filed at 2019 0700  Gross per 24 hour   Intake 3229 ml   Output 3935 ml   Net -706 ml     WEIGHT :  Patient Vitals for the past 96 hrs (Last 3 readings):   Weight   19 0500 180 lb 1.9 oz (81.7 kg)   19 0600 183 lb 3.2 oz (83.1 kg)   19 0513 179 lb 9 oz (81.4 kg)       PHYSICAL EXAM      GENERAL APPEARANCE:Intubated and sedated  SKIN: warm and dry, no rash or erythema  EYES: conjunctivae normal and sclera anicteric  ENT: no thrush no pharyngeal congestion   NECK:   No JVD. No carotid bruits and no carotid lymphadenopathy . PULMONARY: lungs are clear to auscultation. No Wheezing, no ronchi . CADRDIOVASCULAR: S1 and S2 normal NO S3 and NO S4 . No rubs , no murmur. ABDOMEN: soft nontender, bowel sounds present, no organomegaly, no ascites. EXTREMITIES: no cyanosis, clubbing or edema.  Wound vac to left knee     CURRENT MEDICATIONS        metoprolol tartrate (LOPRESSOR) tablet 50 mg BID nystatin (MYCOSTATIN) 832415 UNIT/ML suspension 500,000 Units 4x Daily   heparin (porcine) injection 5,000 Units 3 times per day   famotidine (PEPCID) tablet 20 mg Daily   ceftaroline fosamil (TEFLARO) 400 mg in dextrose 5 % 50 mL IVPB Q12H   aspirin chewable tablet 81 mg Daily   docusate (COLACE) 50 MG/5ML liquid 100 mg Daily   [Held by provider] furosemide (LASIX) injection 40 mg BID   midazolam (VERSED) 100 mg in dextrose 5% 100 mL infusion Continuous   albuterol (PROVENTIL) nebulizer solution 2.5 mg Q6H PRN   dextrose injection 12.5 g Once   albuterol (PROVENTIL) nebulizer solution 2.5 mg Q6H   sodium chloride flush 0.9 % injection 10 mL 2 times per day   sodium chloride flush 0.9 % injection 10 mL PRN   magnesium hydroxide (MILK OF MAGNESIA) 400 MG/5ML suspension 30 mL Daily PRN   0.9 % sodium chloride infusion Continuous   sodium chloride flush 0.9 % injection 10 mL 2 times per day   sodium chloride flush 0.9 % injection 10 mL PRN   calcium chloride 1 g in sodium chloride 0.9 % 100 mL IVPB PRN   magnesium sulfate 1 g in dextrose 5% 100 mL IVPB PRN   potassium chloride 20 mEq/50 mL IVPB (Central Line) PRN   acetaminophen (TYLENOL) tablet 650 mg Q4H PRN   acetaminophen (TYLENOL) suppository 650 mg Q4H PRN   oxyCODONE-acetaminophen (PERCOCET) 5-325 MG per tablet 1 tablet Q4H PRN   Or    oxyCODONE-acetaminophen (PERCOCET) 5-325 MG per tablet 2 tablet Q4H PRN   fentaNYL (SUBLIMAZE) injection 25 mcg Q1H PRN   Or    fentaNYL (SUBLIMAZE) injection 50 mcg Q1H PRN   diphenhydrAMINE (BENADRYL) tablet 25 mg Nightly PRN   polyethylene glycol (GLYCOLAX) packet 17 g Daily   bisacodyl (DULCOLAX) suppository 10 mg Daily PRN   ondansetron (ZOFRAN) injection 4 mg Q8H PRN   amiodarone (CORDARONE) tablet 200 mg BID   chlorhexidine (PERIDEX) 0.12 % solution 15 mL BID   hydrALAZINE (APRESOLINE) injection 5 mg Q5 Min PRN   therapeutic multivitamin-minerals 1 tablet Daily with breakfast   atorvastatin (LIPITOR) tablet 40 mg Nightly clopidogrel (PLAVIX) tablet 75 mg Daily   albumin human 5 % bottle 25 g PRN   insulin lispro (HUMALOG) injection vial 0-12 Units TID WC   insulin lispro (HUMALOG) injection vial 0-6 Units Nightly   glucose (GLUTOSE) 40 % oral gel 15 g PRN   dextrose 50 % solution 12.5 g PRN   glucagon (rDNA) injection 1 mg PRN   dextrose 5 % solution PRN   phenylephrine (GIANNA-SYNEPHRINE) 50 mg in dextrose 5 % 250 mL infusion Continuous PRN   norepinephrine (LEVOPHED) 16 mg in dextrose 5% 250 mL infusion Continuous PRN         LABS      CBC:   Recent Labs     04/12/19  0534 04/12/19  0729 04/13/19  0546 04/14/19  0517   WBC 12.4*  --  16.5* 17.4*   RBC 2.12*  --  3.08* 3.21*   HGB 6.6* 8.7* 9.3* 9.6*   HCT 20.7* 27.4* 29.1* 30.8*   MCV 97.6  --  94.5 96.0   MCH 31.1  --  30.2 29.9   MCHC 31.9  --  32.0 31.2   RDW 14.3  --  14.6* 15.0*   *  --  169 222   MPV 12.4  --  12.3 12.2      BMP:   Recent Labs     04/12/19  0534 04/13/19  0546 04/14/19  0517    136 142   K 4.6 4.6 4.5   * 108* 112*   CO2 18* 18* 22   BUN 43* 48* 30*   CREATININE 1.99* 1.69* 0.67*   GLUCOSE 148* 287* 287*   CALCIUM 7.1* 7.4* 8.1*      MAGNESIUM:   Recent Labs     04/12/19  0534 04/13/19  0546 04/14/19  0517   MG 2.2 2.5 2.2     ALBUMIN:   Recent Labs     04/12/19  1124   LABALBU 2.7*      SPEP:   Lab Results   Component Value Date    PROT 4.3 04/12/2019    ALBCAL PENDING 04/12/2019    ALBPCT PENDING 04/12/2019    LABALPH PENDING 04/12/2019    LABALPH PENDING 04/12/2019    A1PCT PENDING 04/12/2019    A2PCT PENDING 04/12/2019    LABBETA PENDING 04/12/2019    BETAPCT PENDING 04/12/2019    GAMGLOB PENDING 04/12/2019    GGPCT PENDING 04/12/2019    PATH PENDING 04/12/2019     C3:   Lab Results   Component Value Date    C3 45 (L) 04/12/2019     C4:   Lab Results   Component Value Date    C4 6 (L) 04/12/2019     [place] Free Light Chains QNT 9. 43High   0.37 - 1.94 mg/dL Final 04/12/2019  1:32 PM Ashwin Mueller QNT 5. 81High   0.57 - 2.63 mg/dL Final 04/12/2019  1:32  [address]   Free [place]/Lambda Ratio 1.62            RADIOLOGY      Reviewed as available. Renal US  FINDINGS:       Kidneys:       The right kidney measures 12.5 cm in length and the left kidney measures 12.6   cm in length.       Kidneys demonstrate normal cortical echogenicity.  No evidence of   hydronephrosis or intrarenal stones.       There is 1.1 x 1.0 x 0.8 cm left renal cyst.           Other:       Partially imaged gallbladder sludge.  Apparent gallbladder wall thickening.           Impression   1. No evidence of hydronephrosis.  Normal renal cortical echogenicity.       2. Partially imaged gallbladder sludge.  Apparent gallbladder wall   thickening.  Consider further evaluation with dedicated right upper quadrant   ultrasound. ASSESSMENT    1. ALMITA secondary to ischemic ATN. (Normal Baseline) Likely septic. Creatinine down to baseline 0.67.  2. S/p CABG L-LAD, S-PDA, S-OM, S-Cx for multivessel disease on 4/7/19.   3. HYPERTENSION: Controlled. No pressor support    4. S/p cardiac arrest/PEA on admission  5. Respiratory Failure: Continues on vent support  6. Exposed orthopedic hardware right lateral knee. Likely infected hard ware   7. ? Gallbladder Sludge/wall thickening:  HIDA scan ordered   8. ICMP with EF 40      PLAN      1. DC  NS @ 75/hr   2. Lasix PRN  3. Will sign off      Please do not hesitate to call with questions. Electronically signed by BRIDGETT Lin on 4/14/2019 at 7:26 AM   Attending Physician Statement  I have discussed the care of Philip Aiden, including pertinent history and exam findings,  with the CNP. I have reviewed the key elements of all parts of the encounter with the CNP. I agree with the assessment, plan and orders as documented.     Josias Cummins MD, MRCP Víctor Tan, FACP   4/14/2019 12:22 PM    Nephrology [address] [place] [address]

## 2019-04-14 NOTE — PROGRESS NOTES
[facility]   Progress Note                   Date:   4/14/2019  Patient name: Jimbo Green  Date of admission:  4/7/2019  4:48 AM  MRN:   [id number]  YOB: 1965  PCP: No primary care provider on file. Reason for Admission: STEMI    Subjective:       S/P emergent CABG, POD#7    Patient seen and examined. Not as responsive per RN. Only grimaces, but does not withdraw to pain. Had CT head which was unchanged. Awaiting MRI brain? Plan for HIDA noted given patient's GB U/S findings suggestive of sludge. D/W RN in detail. I/O last 3 completed shifts: In: 7600 [I.V.:1762; NG/GT:1467]  Out: 3440 [Urine:3935]  No intake/output data recorded.       Medications:   Scheduled Meds:   metoprolol tartrate  50 mg Oral BID    nystatin  5 mL Oral 4x Daily    heparin (porcine)  5,000 Units Subcutaneous 3 times per day    famotidine  20 mg Oral Daily    ceftaroline fosamil (TEFLARO) IVPB  400 mg Intravenous Q12H    aspirin  81 mg Oral Daily    docusate  100 mg Oral Daily    [Held by provider] furosemide  40 mg Intravenous BID    dextrose  12.5 g Intravenous Once    albuterol  2.5 mg Nebulization Q6H    sodium chloride flush  10 mL Intravenous 2 times per day    sodium chloride flush  10 mL Intravenous 2 times per day    polyethylene glycol  17 g Oral Daily    chlorhexidine  15 mL Mouth/Throat BID    therapeutic multivitamin-minerals  1 tablet Oral Daily with breakfast    atorvastatin  40 mg Oral Nightly    clopidogrel  75 mg Oral Daily    insulin lispro  0-12 Units Subcutaneous TID WC    insulin lispro  0-6 Units Subcutaneous Nightly       Continuous Infusions:   fentaNYL      midazolam 5 mg/hr (04/14/19 0325)    sodium chloride 75 mL/hr at 04/13/19 2049    dextrose      phenylephrine (GIANNA-SYNEPHRINE) 50mg/250mL infusion Stopped (04/08/19 1418)    norepinephrine Stopped (04/08/19 0230)       CBC:   Recent Labs     04/12/19  0534 04/12/19  0729 04/13/19  0546 04/14/19  1343 WBC 12.4*  --  16.5* 17.4*   HGB 6.6* 8.7* 9.3* 9.6*   *  --  169 222     BMP:    Recent Labs     04/12/19  0534 04/13/19  0546 04/14/19  0517    136 142   K 4.6 4.6 4.5   * 108* 112*   CO2 18* 18* 22   BUN 43* 48* 30*   CREATININE 1.99* 1.69* 0.67*   GLUCOSE 148* 287* 287*     Hepatic:   Recent Labs     04/12/19  1124   AST 3,077*   ALT 3,818*   BILITOT 1.90*   ALKPHOS 71     Troponin: No results for input(s): TROPONINI in the last 72 hours. BNP: No results for input(s): BNP in the last 72 hours. Lipids: No results for input(s): CHOL, HDL in the last 72 hours. Invalid input(s): LDLCALCU  INR:   Recent Labs     04/12/19  0534 04/13/19  0546 04/14/19  0517   INR 1.4 1.2 1.2       Objective:   Vitals: BP (!) 145/61   Pulse 94   Temp 97.7 °F (36.5 °C) (Oral)   Resp 14   Ht 5' 8\" (1.727 m)   Wt 180 lb 1.9 oz (81.7 kg)   SpO2 98%   BMI 27.39 kg/m²   General appearance: intubated, but not sedated. Does not follow commands  HEENT: Head: Normocephalic, no lesions, without obvious abnormality  Neck: no JVD  Lungs:  Mechanical ventilator  Heart: regular rate and rhythm, S1, S2 normal, no murmur, click, rub or gallop  Abdomen: soft, non-tender; bowel sounds normal  Extremities: No LE edema  Neurologic: Grimaces to pain. Does not follow commands      EKG: Sinus tachycardia with anteroseptal infarct      Coronary Angiography: 4/7/19  LMCA: has distal 60% stenosis. LAD: has ostial 90% stenosis and long proximal 80% stenosis. D1 has proximal 70% stenosis. LCx: has 20% stenosis. OM1 is very small. OM2 has proximal 90% stenosis. RCA: has proximal 50% stenosis, mid 60% stenosis and distal 80% stenosis. Ramus: has ostial 99% stenosis.     The LV gram was performed in the DUTTON 30 position. LVEF: 15%. LV Wall Motion: severe hypokinesis of the anterior wall and mid inferior wall with apical akinesis. Echo 4/12/19:  Left ventricle is normal in size.  Global left ventricular systolic function is mildly reduced. Calculated ejection fraction is 40. Left ventricular wall thickness is mildly increased. Grade II diastolic dysfunction  Normal valvular function      Assessment:   1. PEA Arrest (Multiple times) with ROSC in few minutes - off sedation and paralytics  2. Late presentation of anteroseptal STEMI   3. Multivessel CAD s/p emergent CABG on 4/7 with LIMA-LAD, SVG-PDA,OM 1 & distal circumflex). 4. S/P sternal closure on 4/8  5. Ischemic cardiomyopathy with acute systolic CHF (EF 71% on ANGELA and TTE)  6. NSVT  7. Post op anemia  8. Post op thrombocytopenia  9. Acute pulmonary edema s/p intubation  10. Sepsis   11. Acute renal failure - creatinine improving slowly  12. Metabolic encephalopathy  13. Exposed orthopedic hardware S/P removal of Protruding orthopedic nail from Rt lateral knee area. Old blood and malodor. 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 14. S/P ORIF Rt proximal tibia. Non compliant with follow up. 15. Shock Liver with AST and ALT in 3000s (AST 3077, ALT 3818)  16. Gallbladder sludge with concern for cholecystitis      Treatment Plan:   1. On ASA 81, Plavix 75, Lipitor 40, Amiodarone 200 bid, Lopressor 25 bid  2. On Teflaro per ID for fevers  3. Discontinue Amiodarone given patient's significantly elevated LFTs (4/14)  4. Increase Lopressor to 50 bid  5. Add Norvasc for better BP control  6. Vent management per Pulmonary  7. Plan for MRI brain and HIDA noted  8. Neuro and ID input appreciated  9. Wean vent as able  10. Check labs  11. Correct electrolytes prn to keep Mag > 2.0 and K > 4.0  12.  Continue post-operative management per CT surgery      Electronically signed on 04/14/19 at 7:43 AM by:    Jeff Khan MD   Fellow, [address]    Attending Cardiologist Addendum: I have reviewed and performed the history, physical, subjective, objective, assessment, and plan with the resident/fellow and agree with the note. I performed the history and physical personally. I have made changes to the note above as needed. Would benefit from some diuresis- but will defer to nephrology  Still awaiting neurological recovery  MRI planned    Thank you for allowing me to participate in the care of this patient, please do not hesitate to call if you have any questions. Jose Angel Olvera DO, [facility] [place], [address] [facility]  [facility]  [phone]

## 2019-04-14 NOTE — PROGRESS NOTES
Updated Cardiology that Dr. Sixto Pimentel wants to have 200mg PO Amiodarone d/c'd due to the patients ALT 3,818 & AST 3,077

## 2019-04-15 ENCOUNTER — APPOINTMENT (OUTPATIENT)
Dept: NUCLEAR MEDICINE | Age: 54
DRG: 165 | End: 2019-04-15
Payer: MEDICAID

## 2019-04-15 ENCOUNTER — APPOINTMENT (OUTPATIENT)
Dept: GENERAL RADIOLOGY | Age: 54
DRG: 165 | End: 2019-04-15
Payer: MEDICAID

## 2019-04-15 LAB
ALBUMIN (CALCULATED): 2.8 G/DL (ref 3.2–5.2)
ALBUMIN PERCENT: 64 % (ref 45–65)
ALLEN TEST: ABNORMAL
ALPHA 1 PERCENT: 6 % (ref 3–6)
ALPHA 2 PERCENT: 10 % (ref 6–13)
ALPHA-1-GLOBULIN: 0.3 G/DL (ref 0.1–0.4)
ALPHA-2-GLOBULIN: 0.4 G/DL (ref 0.5–0.9)
ANION GAP SERPL CALCULATED.3IONS-SCNC: 7 MMOL/L (ref 9–17)
ANTI-NUCLEAR ANTIBODY (ANA): NEGATIVE
BETA GLOBULIN: 0.3 G/DL (ref 0.5–1.1)
BETA PERCENT: 8 % (ref 11–19)
BUN BLDV-MCNC: 27 MG/DL (ref 6–20)
BUN/CREAT BLD: ABNORMAL (ref 9–20)
CALCIUM SERPL-MCNC: 8.4 MG/DL (ref 8.6–10.4)
CHLORIDE BLD-SCNC: 114 MMOL/L (ref 98–107)
CO2: 23 MMOL/L (ref 20–31)
CREAT SERPL-MCNC: 0.52 MG/DL (ref 0.7–1.2)
FIO2: 40
GAMMA GLOBULIN %: 12 % (ref 9–20)
GAMMA GLOBULIN: 0.5 G/DL (ref 0.5–1.5)
GFR AFRICAN AMERICAN: >60 ML/MIN
GFR NON-AFRICAN AMERICAN: >60 ML/MIN
GFR SERPL CREATININE-BSD FRML MDRD: ABNORMAL ML/MIN/{1.73_M2}
GFR SERPL CREATININE-BSD FRML MDRD: ABNORMAL ML/MIN/{1.73_M2}
GLUCOSE BLD-MCNC: 153 MG/DL (ref 75–110)
GLUCOSE BLD-MCNC: 188 MG/DL (ref 75–110)
GLUCOSE BLD-MCNC: 210 MG/DL (ref 75–110)
GLUCOSE BLD-MCNC: 255 MG/DL (ref 70–99)
GLUCOSE BLD-MCNC: 255 MG/DL (ref 74–100)
HCT VFR BLD CALC: 28.8 % (ref 40.7–50.3)
HEMOGLOBIN: 9.1 G/DL (ref 13–17)
INR BLD: 1.1
MAGNESIUM: 2.1 MG/DL (ref 1.6–2.6)
MCH RBC QN AUTO: 30 PG (ref 25.2–33.5)
MCHC RBC AUTO-ENTMCNC: 31.6 G/DL (ref 28.4–34.8)
MCV RBC AUTO: 95 FL (ref 82.6–102.9)
MODE: ABNORMAL
NEGATIVE BASE EXCESS, ART: ABNORMAL (ref 0–2)
NRBC AUTOMATED: 0.2 PER 100 WBC
O2 DEVICE/FLOW/%: ABNORMAL
PATHOLOGIST: ABNORMAL
PATHOLOGIST: NORMAL
PATIENT TEMP: ABNORMAL
PDW BLD-RTO: 15.3 % (ref 11.8–14.4)
PLATELET # BLD: 252 K/UL (ref 138–453)
PMV BLD AUTO: 12 FL (ref 8.1–13.5)
POC HCO3: 25 MMOL/L (ref 21–28)
POC O2 SATURATION: 94 % (ref 94–98)
POC PCO2 TEMP: ABNORMAL MM HG
POC PCO2: 32.2 MM HG (ref 35–48)
POC PH TEMP: ABNORMAL
POC PH: 7.5 (ref 7.35–7.45)
POC PO2 TEMP: ABNORMAL MM HG
POC PO2: 65.7 MM HG (ref 83–108)
POSITIVE BASE EXCESS, ART: 2 (ref 0–3)
POTASSIUM SERPL-SCNC: 4.2 MMOL/L (ref 3.7–5.3)
PROTEIN ELECTROPHORESIS, SERUM: ABNORMAL
PROTHROMBIN TIME: 11.7 SEC (ref 9–12)
RBC # BLD: 3.03 M/UL (ref 4.21–5.77)
SAMPLE SITE: ABNORMAL
SERUM IFX INTERP: NORMAL
SODIUM BLD-SCNC: 144 MMOL/L (ref 135–144)
TCO2 (CALC), ART: 26 MMOL/L (ref 22–29)
TOTAL PROT. SUM,%: 100 % (ref 98–102)
TOTAL PROT. SUM: 4.3 G/DL (ref 6.3–8.2)
TOTAL PROTEIN: 4.3 G/DL (ref 6.4–8.3)
WBC # BLD: 17.5 K/UL (ref 3.5–11.3)

## 2019-04-15 PROCEDURE — 6370000000 HC RX 637 (ALT 250 FOR IP): Performed by: PHYSICIAN ASSISTANT

## 2019-04-15 PROCEDURE — 97606 NEG PRS WND THER DME>50 SQCM: CPT

## 2019-04-15 PROCEDURE — 6360000002 HC RX W HCPCS: Performed by: FAMILY MEDICINE

## 2019-04-15 PROCEDURE — 82803 BLOOD GASES ANY COMBINATION: CPT

## 2019-04-15 PROCEDURE — 2580000003 HC RX 258: Performed by: NURSE PRACTITIONER

## 2019-04-15 PROCEDURE — 2100000001 HC CVICU R&B

## 2019-04-15 PROCEDURE — 6370000000 HC RX 637 (ALT 250 FOR IP): Performed by: INTERNAL MEDICINE

## 2019-04-15 PROCEDURE — 6360000002 HC RX W HCPCS: Performed by: INTERNAL MEDICINE

## 2019-04-15 PROCEDURE — 2700000000 HC OXYGEN THERAPY PER DAY

## 2019-04-15 PROCEDURE — 2580000003 HC RX 258: Performed by: INTERNAL MEDICINE

## 2019-04-15 PROCEDURE — 94003 VENT MGMT INPAT SUBQ DAY: CPT

## 2019-04-15 PROCEDURE — 78226 HEPATOBILIARY SYSTEM IMAGING: CPT

## 2019-04-15 PROCEDURE — 99024 POSTOP FOLLOW-UP VISIT: CPT | Performed by: PHYSICIAN ASSISTANT

## 2019-04-15 PROCEDURE — A9537 TC99M MEBROFENIN: HCPCS | Performed by: INTERNAL MEDICINE

## 2019-04-15 PROCEDURE — 99232 SBSQ HOSP IP/OBS MODERATE 35: CPT | Performed by: INTERNAL MEDICINE

## 2019-04-15 PROCEDURE — 80048 BASIC METABOLIC PNL TOTAL CA: CPT

## 2019-04-15 PROCEDURE — 6360000002 HC RX W HCPCS: Performed by: THORACIC SURGERY (CARDIOTHORACIC VASCULAR SURGERY)

## 2019-04-15 PROCEDURE — 6370000000 HC RX 637 (ALT 250 FOR IP): Performed by: NURSE PRACTITIONER

## 2019-04-15 PROCEDURE — 94770 HC ETCO2 MONITOR DAILY: CPT

## 2019-04-15 PROCEDURE — 83735 ASSAY OF MAGNESIUM: CPT

## 2019-04-15 PROCEDURE — 82947 ASSAY GLUCOSE BLOOD QUANT: CPT

## 2019-04-15 PROCEDURE — 36415 COLL VENOUS BLD VENIPUNCTURE: CPT

## 2019-04-15 PROCEDURE — 85610 PROTHROMBIN TIME: CPT

## 2019-04-15 PROCEDURE — 76937 US GUIDE VASCULAR ACCESS: CPT

## 2019-04-15 PROCEDURE — 85027 COMPLETE CBC AUTOMATED: CPT

## 2019-04-15 PROCEDURE — 3430000000 HC RX DIAGNOSTIC RADIOPHARMACEUTICAL: Performed by: INTERNAL MEDICINE

## 2019-04-15 PROCEDURE — 94640 AIRWAY INHALATION TREATMENT: CPT

## 2019-04-15 PROCEDURE — 6370000000 HC RX 637 (ALT 250 FOR IP): Performed by: FAMILY MEDICINE

## 2019-04-15 PROCEDURE — 94762 N-INVAS EAR/PLS OXIMTRY CONT: CPT

## 2019-04-15 PROCEDURE — 99291 CRITICAL CARE FIRST HOUR: CPT | Performed by: INTERNAL MEDICINE

## 2019-04-15 PROCEDURE — 6360000002 HC RX W HCPCS: Performed by: NURSE PRACTITIONER

## 2019-04-15 PROCEDURE — 97110 THERAPEUTIC EXERCISES: CPT

## 2019-04-15 PROCEDURE — 2500000003 HC RX 250 WO HCPCS: Performed by: INTERNAL MEDICINE

## 2019-04-15 PROCEDURE — 71045 X-RAY EXAM CHEST 1 VIEW: CPT

## 2019-04-15 PROCEDURE — 99233 SBSQ HOSP IP/OBS HIGH 50: CPT | Performed by: INTERNAL MEDICINE

## 2019-04-15 PROCEDURE — 99232 SBSQ HOSP IP/OBS MODERATE 35: CPT | Performed by: NURSE PRACTITIONER

## 2019-04-15 RX ORDER — AMLODIPINE BESYLATE 5 MG/1
5 TABLET ORAL ONCE
Status: COMPLETED | OUTPATIENT
Start: 2019-04-15 | End: 2019-04-15

## 2019-04-15 RX ORDER — SODIUM CHLORIDE 0.9 % (FLUSH) 0.9 %
10 SYRINGE (ML) INJECTION PRN
Status: DISCONTINUED | OUTPATIENT
Start: 2019-04-15 | End: 2019-04-25 | Stop reason: HOSPADM

## 2019-04-15 RX ORDER — LABETALOL HYDROCHLORIDE 5 MG/ML
10 INJECTION, SOLUTION INTRAVENOUS EVERY 6 HOURS PRN
Status: DISCONTINUED | OUTPATIENT
Start: 2019-04-15 | End: 2019-04-25 | Stop reason: HOSPADM

## 2019-04-15 RX ORDER — AMLODIPINE BESYLATE 10 MG/1
10 TABLET ORAL DAILY
Status: DISCONTINUED | OUTPATIENT
Start: 2019-04-16 | End: 2019-04-15

## 2019-04-15 RX ADMIN — PROPOFOL 25 MCG/KG/MIN: 10 INJECTION, EMULSION INTRAVENOUS at 09:52

## 2019-04-15 RX ADMIN — ALBUTEROL SULFATE 2.5 MG: 2.5 SOLUTION RESPIRATORY (INHALATION) at 14:21

## 2019-04-15 RX ADMIN — CLOPIDOGREL 75 MG: 75 TABLET, FILM COATED ORAL at 09:50

## 2019-04-15 RX ADMIN — ASPIRIN 81 MG 81 MG: 81 TABLET ORAL at 09:50

## 2019-04-15 RX ADMIN — Medication 15 ML: at 20:08

## 2019-04-15 RX ADMIN — ACETAMINOPHEN 650 MG: 325 TABLET ORAL at 09:56

## 2019-04-15 RX ADMIN — ALBUTEROL SULFATE 2.5 MG: 2.5 SOLUTION RESPIRATORY (INHALATION) at 20:37

## 2019-04-15 RX ADMIN — SODIUM CHLORIDE, PRESERVATIVE FREE 10 ML: 5 INJECTION INTRAVENOUS at 08:20

## 2019-04-15 RX ADMIN — FAMOTIDINE 20 MG: 20 TABLET, FILM COATED ORAL at 20:04

## 2019-04-15 RX ADMIN — Medication 15 ML: at 09:51

## 2019-04-15 RX ADMIN — INSULIN GLARGINE 20 UNITS: 100 INJECTION, SOLUTION SUBCUTANEOUS at 09:52

## 2019-04-15 RX ADMIN — PROPOFOL 20 MCG/KG/MIN: 10 INJECTION, EMULSION INTRAVENOUS at 04:06

## 2019-04-15 RX ADMIN — ENOXAPARIN SODIUM 40 MG: 40 INJECTION SUBCUTANEOUS at 09:51

## 2019-04-15 RX ADMIN — INSULIN LISPRO 2 UNITS: 100 INJECTION, SOLUTION INTRAVENOUS; SUBCUTANEOUS at 22:03

## 2019-04-15 RX ADMIN — Medication 500000 UNITS: at 13:13

## 2019-04-15 RX ADMIN — METOPROLOL TARTRATE 50 MG: 25 TABLET ORAL at 09:50

## 2019-04-15 RX ADMIN — Medication 3 MILLICURIE: at 08:20

## 2019-04-15 RX ADMIN — Medication 10 MG: at 15:55

## 2019-04-15 RX ADMIN — Medication 10 ML: at 19:40

## 2019-04-15 RX ADMIN — METOPROLOL TARTRATE 50 MG: 25 TABLET ORAL at 20:04

## 2019-04-15 RX ADMIN — AMLODIPINE BESYLATE 5 MG: 5 TABLET ORAL at 17:08

## 2019-04-15 RX ADMIN — CEFTAROLINE FOSAMIL 400 MG: 400 POWDER, FOR SOLUTION INTRAVENOUS at 13:12

## 2019-04-15 RX ADMIN — ALBUTEROL SULFATE 2.5 MG: 2.5 SOLUTION RESPIRATORY (INHALATION) at 07:40

## 2019-04-15 RX ADMIN — MULTIPLE VITAMINS W/ MINERALS TAB 1 TABLET: TAB at 09:51

## 2019-04-15 RX ADMIN — INSULIN LISPRO 2 UNITS: 100 INJECTION, SOLUTION INTRAVENOUS; SUBCUTANEOUS at 15:57

## 2019-04-15 RX ADMIN — POTASSIUM CHLORIDE 20 MEQ: 29.8 INJECTION, SOLUTION INTRAVENOUS at 06:22

## 2019-04-15 RX ADMIN — ALBUTEROL SULFATE 2.5 MG: 2.5 SOLUTION RESPIRATORY (INHALATION) at 03:25

## 2019-04-15 RX ADMIN — Medication 10 ML: at 09:52

## 2019-04-15 RX ADMIN — POLYETHYLENE GLYCOL 3350 17 G: 17 POWDER, FOR SOLUTION ORAL at 09:50

## 2019-04-15 RX ADMIN — FAMOTIDINE 20 MG: 20 TABLET, FILM COATED ORAL at 09:50

## 2019-04-15 RX ADMIN — DESMOPRESSIN ACETATE 40 MG: 0.2 TABLET ORAL at 20:04

## 2019-04-15 RX ADMIN — Medication 100 MG: at 09:51

## 2019-04-15 RX ADMIN — AMLODIPINE BESYLATE 5 MG: 5 TABLET ORAL at 09:50

## 2019-04-15 RX ADMIN — INSULIN LISPRO 6 UNITS: 100 INJECTION, SOLUTION INTRAVENOUS; SUBCUTANEOUS at 05:17

## 2019-04-15 RX ADMIN — ACETAMINOPHEN 650 MG: 325 TABLET ORAL at 17:07

## 2019-04-15 RX ADMIN — Medication 500000 UNITS: at 20:04

## 2019-04-15 RX ADMIN — Medication 10 ML: at 09:51

## 2019-04-15 RX ADMIN — Medication 500000 UNITS: at 17:08

## 2019-04-15 RX ADMIN — CEFTAROLINE FOSAMIL 600 MG: 600 POWDER, FOR SOLUTION INTRAVENOUS at 23:53

## 2019-04-15 RX ADMIN — Medication 500000 UNITS: at 09:51

## 2019-04-15 RX ADMIN — INSULIN LISPRO 2 UNITS: 100 INJECTION, SOLUTION INTRAVENOUS; SUBCUTANEOUS at 10:05

## 2019-04-15 ASSESSMENT — PULMONARY FUNCTION TESTS
PIF_VALUE: 28
PIF_VALUE: 20
PIF_VALUE: 25
PIF_VALUE: 37
PIF_VALUE: 24
PIF_VALUE: 25
PIF_VALUE: 23

## 2019-04-15 NOTE — PROGRESS NOTES
Progress Note    Patient - Todd Servin  Date of Admission -  2019  4:48 AM  Date of Evaluation -  4/15/2019  Room and Bed Number -  1012/1012-01   Hospital Day - 8    CHIEF COMPLAINT :   Chief Complaint   Patient presents with    Cardiac Arrest     diff breather, brought in on cpap with elevations in EKG on EMS arrival.        SUBJECTIVE:     OVERNIGHT EVENTS:         No acute events   Off fentanyl gtt   bp in 180 when patient off sedation           SECRETIONS Amount:  [x] Small [] Moderate  [] Large  [] None  Color:     [x] White [] Colored  [] Bloody    SEDATION:  RAAS Score:  [] Propofol gtt  [] Versed gtt  [] Ativan gtt   [] No Sedation    PARALYZED:  [x] No    [] Yes    VASOPRESSORS:  [x] No    [] Yes  [] Levophed [] Dopamine [] Vasopressin  [] Dobutamine [] Phenylephrine [] Epinephrine   Ros unable to perform due to intubation and ENCEPHALOPATHY     OBJECTIVE:     VITAL SIGNS:  BP (!) 171/72   Pulse 89   Temp 99.3 °F (37.4 °C) (Axillary)   Resp 14   Ht 5' 8\" (1.727 m)   Wt 180 lb 1.9 oz (81.7 kg)   SpO2 94%   BMI 27.39 kg/m²   Tmax over 24 hours:  Temp (24hrs), Av.5 °F (36.9 °C), Min:98 °F (36.7 °C), Max:99.3 °F (37.4 °C)      Patient Vitals for the past 8 hrs:   BP Temp Temp src Pulse Resp SpO2   04/15/19 1707 (!) 171/72 -- -- -- -- --   04/15/19 1600 (!) 140/62 -- -- 89 -- 94 %   04/15/19 1552 -- 99.3 °F (37.4 °C) Axillary -- -- --   04/15/19 1527 -- -- -- 94 14 95 %   04/15/19 1500 -- -- -- 92 -- 95 %   04/15/19 1400 -- -- -- 83 -- 96 %   04/15/19 1300 -- -- -- 80 -- 96 %   04/15/19 1200 (!) 89/43 -- -- 77 -- 97 %   04/15/19 1145 -- 98.8 °F (37.1 °C) Axillary 77 -- 97 %   04/15/19 1123 -- -- -- 75 14 98 %   04/15/19 1100 (!) 105/56 -- -- 73 -- 98 %           Intake/Output Summary (Last 24 hours) at 4/15/2019 1827  Last data filed at 4/15/2019 1800  Gross per 24 hour   Intake 1133 ml   Output 1820 ml   Net -687 ml     Date 04/15/19 0000 - 04/15/19 [address]   Shift 4754-5023 6070-9947 3753-3634 24 Hour Total   INTAKE   I.V.(mL/kg) 284(3.5)  317(3.9) 601(7.4)   NG/GT(mL/kg)   259(3.2) 259(3.2)   Shift Total(mL/kg) 284(3.5)  576(7.1) 860(10.5)   OUTPUT   Urine(mL/kg/hr) 830(1.3) 600(0.9) 250 1680   Shift Total(mL/kg) 830(10.2) 600(7.3) 250(3.1) 1680(20.6)   Weight (kg) 81.7 81.7 81.7 81.7     Wt Readings from Last 3 Encounters:   04/15/19 180 lb 1.9 oz (81.7 kg)     Body mass index is 27.39 kg/m². PHYSICAL EXAM:  Head and neck atraumatic, normocephalic    Lymph nodes-no cervical, supraclavicular lymphadenopathy    ICTRUS IN CONJUNCTIVA dec   oral et and og     Lungs - Ventilating all lobes  No wheeze no rales     CVS- S1, S2 regular. No S3 no S4, no murmurs    strenotomy     Abdomen-nontender, nondistended. Bowel sounds are present.   MILD HEPATOMEGALY     Lower extremity-+ edema    Upper extremity-no edema    Neurological-on sedation holiday- FOR LAST 24 HRS NO RESPONSE TO DEEP PAIN , MILD GAG AND COUGH     Rt knee sinus tract       MEDICATIONS:  Scheduled Meds:   [START ON 4/16/2019] ceftaroline fosamil (TEFLARO) IVPB  600 mg Intravenous Q12H    metoprolol tartrate  50 mg Oral BID    insulin glargine  20 Units Subcutaneous Daily    insulin lispro  0-12 Units Subcutaneous Q6H    famotidine  20 mg Oral BID    enoxaparin  40 mg Subcutaneous Daily    nystatin  5 mL Oral 4x Daily    aspirin  81 mg Oral Daily    docusate  100 mg Oral Daily    dextrose  12.5 g Intravenous Once    albuterol  2.5 mg Nebulization Q6H    sodium chloride flush  10 mL Intravenous 2 times per day    sodium chloride flush  10 mL Intravenous 2 times per day    polyethylene glycol  17 g Oral Daily    chlorhexidine  15 mL Mouth/Throat BID    therapeutic multivitamin-minerals  1 tablet Oral Daily with breakfast    atorvastatin  40 mg Oral Nightly    clopidogrel  75 mg Oral Daily     Continuous Infusions:   niCARdipine      propofol Stopped (04/15/19 1045)    sodium chloride 20 mL/hr (04/14/19 1141)    dextrose PRN Meds:     sodium chloride flush 10 mL PRN   labetalol 10 mg Q6H PRN   naloxone 0.4 mg PRN   albuterol 2.5 mg Q6H PRN   sodium chloride flush 10 mL PRN   magnesium hydroxide 30 mL Daily PRN   sodium chloride flush 10 mL PRN   calcium chloride IVPB 1 g PRN   magnesium sulfate 1 g PRN   potassium chloride 20 mEq PRN   acetaminophen 650 mg Q4H PRN   acetaminophen 650 mg Q4H PRN   oxyCODONE-acetaminophen 1 tablet Q4H PRN   Or     oxyCODONE-acetaminophen 2 tablet Q4H PRN   diphenhydrAMINE 25 mg Nightly PRN   bisacodyl 10 mg Daily PRN   ondansetron 4 mg Q8H PRN   hydrALAZINE 5 mg Q5 Min PRN   albumin human 25 g PRN   glucose 15 g PRN   dextrose 12.5 g PRN   glucagon (rDNA) 1 mg PRN   dextrose 100 mL/hr PRN       SUPPORT DEVICES: [x] Ventilator [] BIPAP  [] Nasal Cannula [] Room Air    VENT SETTINGS (Comprehensive) (if applicable):      Vent Information  $Ventilation: $Subsequent Day  Ventilator Started: Yes  Skin Assessment: Clean, dry, & intact  Equipment Changed: Expiratory Filter  Vent Type: Servo i  Vent Mode: PRVC  Vt Ordered: 660 mL  Rate Set: 14 bmp  Pressure Support: 10 cmH20  FiO2 : 40 %  Sensitivity: 5  PEEP/CPAP: 5  I Time/ I Time %: 0.85 s  Humidification Source: Heated wire  Humidification Temp: 37  Humidification Temp Measured: 36.8  Circuit Condensation: Drained  Nitric Oxide/Epoprostenol In Use?: No  Additional Respiratory  Assessments  Pulse: 89  Resp: 14  SpO2: 94 %  End Tidal CO2: 33 (%)  Position: Semi-Quintana's  Humidification Source: Heated wire  Humidification Temp: 37  Circuit Condensation: Drained  Oral Care Completed?: Yes  Oral Care: Mouth swabbed, Mouth moisturizer, Mouth suctioned  Subglottic Suction Done?: Yes  Skin barrier applied: Yes    ABGs:     Lab Results   Component Value Date    EMK4UCZ 26 04/15/2019    FIO2 40.0 04/15/2019       DATA:  Complete Blood Count:   Recent Labs     04/13/19  0546 04/14/19  0517 04/15/19  0516   WBC 16.5* 17.4* 17.5*   RBC 3.08* 3.21* 3.03*   HGB 9.3* 9. 6* 9.1*   HCT 29.1* 30.8* 28.8*   MCV 94.5 96.0 95.0   MCH 30.2 29.9 30.0   MCHC 32.0 31.2 31.6   RDW 14.6* 15.0* 15.3*    222 252   MPV 12.3 12.2 12.0        Last 3 Blood Glucose:   Recent Labs     04/13/19  0546 04/14/19  0517 04/15/19  0516   GLUCOSE 287* 287* 255*        PT/INR:    Lab Results   Component Value Date    PROTIME 11.7 04/15/2019    INR 1.1 04/15/2019     PTT:    Lab Results   Component Value Date    APTT 25.7 04/07/2019       Comprehensive Metabolic Profile:   Recent Labs     04/13/19  0546 04/14/19  0517 04/15/19  0516    142 144   K 4.6 4.5 4.2   * 112* 114*   CO2 18* 22 23   BUN 48* 30* 27*   CREATININE 1.69* 0.67* 0.52*   GLUCOSE 287* 287* 255*   CALCIUM 7.4* 8.1* 8.4*   PROT  --  5.5*  --    LABALBU  --  2.7*  --    BILITOT  --  1.48*  --    ALKPHOS  --  151*  --    AST  --  467*  --    ALT  --  1,843*  --       Magnesium:   Lab Results   Component Value Date    MG 2.1 04/15/2019    MG 2.2 04/14/2019    MG 2.5 04/13/2019     Phosphorus: No results found for: PHOS  Ionized Calcium:   Lab Results   Component Value Date    CAION 0.97 04/09/2019    CAION 1.01 04/07/2019        Urinalysis:   Lab Results   Component Value Date    NITRU NEGATIVE 04/14/2019    COLORU DARK YELLOW 04/14/2019    PHUR 5.0 04/14/2019    WBCUA 0 TO 2 04/14/2019    RBCUA 2 TO 5 04/14/2019    MUCUS NOT REPORTED 04/14/2019    TRICHOMONAS NOT REPORTED 04/14/2019    YEAST NOT REPORTED 04/14/2019    BACTERIA NOT REPORTED 04/14/2019    SPECGRAV 1.024 04/14/2019    LEUKOCYTESUR NEGATIVE 04/14/2019    UROBILINOGEN Normal 04/14/2019    BILIRUBINUR NEGATIVE 04/14/2019    GLUCOSEU 3+ 04/14/2019    KETUA NEGATIVE 04/14/2019    AMORPHOUS NOT REPORTED 04/14/2019       HgBA1c:    Lab Results   Component Value Date    LABA1C 10.8 04/07/2019         Xray   Reviewed   ASSESSMENT:     Patient Active Problem List    Diagnosis Date Noted    Acute cerebral infarction associated with systemic hypoxia or ischemia     Fever chills     Hepatitis     Metabolic encephalopathy     Abnormal CT of the head     Cardiac arrest ([facility] [address] [address] 04/07/2019    Severe left ventricular systolic dysfunction 91/42/3145    Acute respiratory failure with hypoxemia (HCC) 04/07/2019    Cardiogenic pulmonary edema (HCC) 04/07/2019    PAD (peripheral artery disease) (HCC) 04/07/2019    Acute ST elevation myocardial infarction (STEMI) of anterior wall (HCC)     Subacute osteomyelitis of right tibia (HCC)     Exposed orthopaedic hardware ([facility] [address]    pulmonary vascular edema    Acute kidney injury- and OLIGOuric  Sirs positive sepsis better    CVA,    Ischemic hepatitis better    Encephalopathy, metabolic   PLAN:     WEAN PER PROTOCOL:  [] No   [x] Yes  [] N/A    DISCONTINUE ANY LABS:   [x] No   [] Yes    ICU PROPHYLAXIS:  Stress ulcer:  [] PPI Agent  [x] F8Kitur [] Sucralfate  [] Other:  VTE:   [] Enoxaparin  [x] Unfract. Heparin Subcut  [] EPC Cuffs    NUTRITION:  [] NPO [x] start Tube Feeding  [] TPN  [] PO        INSULIN DRIP:   [x] No   [] Yes    CONSULTATION NEEDED:  [x] No   [] Yes    FAMILY UPDATED:    [] No   [] Yes    TRANSFER OUT OF ICU:   [x] No   [] Yes    ADDITIONAL PLAN:  LFT are better , hida negative for acute cholecystitis   ifeanyi tf   Off sedation since this am and opens eyes to voice .    For sedation - use propofol gtt   Avoid bzd and narcotics     Keep  -180 and once ok with neurology normalize -increase norvasc to 10 mg daily     Carotid scans completed results pending - d/w neurology and will defer to vascular     Continue daily sbt     At present patient needs time as he most likely has metabolic encephalopathy     D/w family - his father and step mother   Tahir Causey MD  4/15/2019 6:27 PM    Total critical care time caring for this patient with life threatening, unstable organ failure, including direct patient contact, management of life support systems, review of data including imaging and labs, discussions with other team members and physicians at least 28  Min so far today, excluding procedures.

## 2019-04-15 NOTE — PROGRESS NOTES
[facility]  Daily Progress Note  Surgeon:  Dr. Antonette Khan     POD# 7   S/P :  Emerg Coronary artery bypass 4/7/19  Chest closure 4/8/19  EF: 45 %    Subjective:  Mr. Bucky Dennis is intubated.  On versed and hourly fentanyl    Physical Exam  Vital Signs: BP (!) 106/52   Pulse 72   Temp 97.7 °F (36.5 °C) (Oral)   Resp 14   Ht 5' 8\" (1.727 m)   Wt 180 lb 1.9 oz (81.7 kg)   SpO2 99%   BMI 27.39 kg/m²      Admit Weight: Weight: 176 lb 5.9 oz (80 kg)   WEIGHTWeight: 180 lb 1.9 oz (81.7 kg)       Scheduled Meds:    metoprolol tartrate  50 mg Oral BID    insulin glargine  20 Units Subcutaneous Daily    amLODIPine  5 mg Oral Daily    insulin lispro  0-12 Units Subcutaneous Q6H    famotidine  20 mg Oral BID    enoxaparin  40 mg Subcutaneous Daily    nystatin  5 mL Oral 4x Daily    ceftaroline fosamil (TEFLARO) IVPB  400 mg Intravenous Q12H    aspirin  81 mg Oral Daily    docusate  100 mg Oral Daily    dextrose  12.5 g Intravenous Once    albuterol  2.5 mg Nebulization Q6H    sodium chloride flush  10 mL Intravenous 2 times per day    sodium chloride flush  10 mL Intravenous 2 times per day    polyethylene glycol  17 g Oral Daily    chlorhexidine  15 mL Mouth/Throat BID    therapeutic multivitamin-minerals  1 tablet Oral Daily with breakfast    atorvastatin  40 mg Oral Nightly    clopidogrel  75 mg Oral Daily     Continuous Infusions:    fentaNYL Stopped (04/14/19 1942)    propofol      midazolam Stopped (04/14/19 1730)    sodium chloride 20 mL/hr (04/14/19 1141)    dextrose         Data:  CBC:   Recent Labs     04/12/19  0534 04/12/19  0729 04/13/19  0546 04/14/19  0517   WBC 12.4*  --  16.5* 17.4*   HGB 6.6* 8.7* 9.3* 9.6*   HCT 20.7* 27.4* 29.1* 30.8*   MCV 97.6  --  94.5 96.0   *  --  169 222     BMP:   Recent Labs     04/12/19  0534 04/13/19  0546 04/14/19  0517    136 142   K 4.6 4.6 4.5   * 108* 112*   CO2 18* 18* 22   BUN 43* 48* 30*   CREATININE 1.99* 1.69* 0.67*     PT/INR:   Recent Labs     04/12/19  0534 04/13/19  0546 04/14/19  0517   PROTIME 14.7* 12.7* 12.9*   INR 1.4 1.2 1.2     APTT: No results for input(s): APTT in the last 72 hours. Chest X-Ray:  4/10/19: There is redemonstration of bilateral pulmonary congestion/edema that is   similar compared to prior. Margert Osiel are bibasilar effusions. Margert Osiel is no   pneumothorax.  The heart is prominent.  The upper abdomen is unremarkable. The extrathoracic soft tissues are unremarkable. Margert Osiel is an endotracheal   tube with the tip in the midtrachea.  There is a gastric tube with the tip in   the proximal aspect of the stomach.  The right internal jugular line has been   removed.  The Eudora-Carly catheter has been removed.           Impression   Cardiomegaly and bilateral pulmonary congestion/edema that is similar   compared to prior.       Bibasilar effusions and stable endotracheal tube and gastric tube.                I/O:  I/O last 3 completed shifts: In: 3078.7 [I.V.:1771.7; NG/GT:1307]  Out: 3439 [Urine:3420; Drains:100]      Assessment & Plan:   Patient Active Problem List   Diagnosis    Cardiac arrest ([address]    Acute ST elevation myocardial infarction (STEMI) of anterior wall ([address]    Severe left ventricular systolic dysfunction    Acute respiratory failure with hypoxemia ([address]    Cardiogenic pulmonary edema (HCC)    PAD (peripheral artery disease) (HCC)    Subacute osteomyelitis of right tibia ([address]    Exposed orthopaedic hardware ([address]    Metabolic encephalopathy    Abnormal CT of the head    Fever chills    Hepatitis     1. S/p Emergent CABG   POD 7  2. HD - Htn add fentanyl PCA  3. Pulmo - sedated, intubated, keep intubated   4. GI - hypoactive TF w/out residual  5.  - good UOP  6. Neuro - grimace to pain  7.  ABLA - stable  PAD - Vascular on board  S/p removal of prosthetic screw from rt proximal tibia - waiting for culture  ID on board - Pily  Consult  for medicine [id number]

## 2019-04-15 NOTE — PROGRESS NOTES
Nutrition Assessment (Enteral Nutrition)    Type and Reason for Visit: Reassess    Nutrition Recommendations:   -Continue NPO status  -Restart tube feeding as able of Vital AF 1.2 (Semi-elemental and carb controlled) @ goal rate of 60 mL/hr until 1440 mLs infused (x24 hrs) ~>1728 kcals, 108 gms protein, 1168 mLs water (w/ current propofol rate 2052 kcals)  -Will monitor EN intake/tolerance; will adjust as needed. Nutrition Assessment: Tube feed currently held 2/2 HIDA scan today. PT s/p ORIF. Pt remains intubated and sedated. Restart tube feeding as medically able.      Malnutrition Assessment:  · Malnutrition Status: Insufficient data    Nutrition Risk Level: High    Nutrition Needs:  · Estimated Daily Total Kcal: 20-25 kcal/kg ~>5846-9419 kcals/d   · Estimated Daily Protein (g): 1.2-2.0 gm/kg ~> gms/d     Nutrition Diagnosis:   · Problem: Inadequate oral intake  · Etiology: related to Impaired respiratory function-inability to consume food, Insufficient energy/nutrient consumption     Signs and symptoms:  as evidenced by NPO status due to medical condition, Intubation, Nutrition support - EN    Objective Information:  · Wound Type: Multiple, Open Wounds, Pressure Ulcer, Unstageable, Skin Tears, Surgical Wound  · Current Nutrition Therapies:  · Oral Diet Orders: NPO   · Tube Feeding (TF) Orders:   · Feeding Route: Nasogastric  · Formula: Semi-elemental  · Duration: Continuous  · Goal TF & Flush Orders Provides: Vital AF 1.2 (Semi-elemental and carb controlled) @ goal rate of 60 mL/hr until 1440 mLs infused (x24 hrs) ~>1728 kcals, 108 gms protein, 1168 mLs water  · Additional Calories: propofol running @ 12.3 mLs/hr ~>325 kcals   · Anthropometric Measures:  · Ht: 5' 8\" (172.7 cm)   · Current Body Wt: 180 lb (81.6 kg)  · Admission Body Wt: 176 lb (79.8 kg)  · Weight Change: minor wt flux since admission    · Ideal Body Wt: 154 lb (69.9 kg), % Ideal Body 114% (adm/ideal)  · BMI Classification: BMI 25.0 - 29.9 Overweight    Nutrition Interventions:   Continue NPO(Restart tube feeding as able)  Continued Inpatient Monitoring, Education not appropriate at this time    Nutrition Evaluation:   · Evaluation: Progress towards goals declining   · Goals: Pt to meet % of est'd needs via EN   · Monitoring: Nutrition Progression, TF Intake, TF Tolerance, Skin Integrity, Wound Healing, I&O, Weight, Pertinent Labs, Monitor Bowel Function, Monitor Hemodynamic Status      Electronically signed by Tc Dawn RD, GRISELDA on 4/15/19 at 10:13 AM    Contact Number: [phone]

## 2019-04-15 NOTE — PROGRESS NOTES
Physical Therapy  DATE: 4/15/2019  NAME: Jennifer Murray  MRN: [id number]   : 1965      Discharge Recommendations   Further therapy recommended at discharge. Subjective: Pt intubated, supine in bed. Pt opened eyes when writer stated name. Pain: OTONIEL  Patient follows: No commands  Is patient on ventilator: Yes  Is patient on sedation: Yes  Precautions: Cardiac, ART line in R groin, No hip flexion. Therapeutic exercises: PROM of BUE and LLE   all planes 15x  Reps. Goals  Short Term Goals  Short term goal 1: Maintain ROM x4 extremities  Short term goal 2: facilitate active movement as able  Short term goal 3: mobilize as medically appropriate    Plan: Progress functional mobility as medically appropriate. Time In: 1255  Time Out: 0110  Time Coded Minutes (treatment minutes): 15  Rehab Potential: Good  Treatments/week: QD /7 days per week    VEE Mathew  Treatment performed by Student PTA under the supervision of co-signing PTA who agrees with all treatment and documentation.    Jessica Yeboah PTA

## 2019-04-15 NOTE — PROGRESS NOTES
Vascular Resident updated about intermittent/diminishing AT pulse on RLE. Tawnya aguilarer applied. Will continue to monitor closely.

## 2019-04-15 NOTE — PROGRESS NOTES
Orthopedic Progress Note    Patient:  Joaquín Shabazz  YOB: 1965     46 y.o. male    Subjective:  Patient seen and examined at bedside. Patient remains intubated and sedated. Patient had MRI of brain performed yesterday demonstrating multiple new infarcts. Patient not responding to commands when off sedation per nursing, only grimaces to noxious stimuli. Per nursing plan is family discussion today for possible change in code status. Objective:   Vitals:    04/15/19 0400   BP: (!) 142/66   Pulse: 85   Resp:    Temp:    SpO2: 97%     Gen: intubated and sedated    RLE: Dressing to right anterior and lateral knee, C/D/I. Toes warm and well perfused this morning. Compartments soft and compressible. Neuromotor examination not performed due to patient being sedated. Recent Labs     04/14/19  0517   WBC 17.4*   HGB 9.6*   HCT 30.8*      INR 1.2      K 4.5   BUN 30*   CREATININE 0.67*   GLUCOSE 287*      Meds: ASA. Lovenox, Plavix, Teflaro  See rec for complete list    Impression/plan: 46 y.o. male being seen for R knee infected hardware. S/p bedside screw removal, 4/7/19     -When medically stable will need hardware removal from R proximal tibia. -Cultures growing Gram positive cocci as well as Gram negative rods.   -Continue IV antibiotics per infectious disease  -Continue dressing changes per nursing daily. -WBAT RLE  -Pain control and medical management per primary team  -Ice (20 min, 1 hour off) and elevation for edema/pain control  -DVT ppx: Ok with chemical AC form orthopedic perspective management per primary  -PT/OT to evaluate and treat when able to participate  -Please page ortho when patient is medically stabilized for re-evaluation and to consider surgical intervention.    -Please page ortho with any questions    Lucho Monet, DO   Orthopedic Surgery Resident PGY-1  [facility], [facility]    PGY-3 Addendum:    Patient seen and examined at bedside. Per nursing only grimaces to noxious stimuli, but does not withdrawal to pain. Remains sedated, intubated, and mechanically ventilated. Does not respond or follow commands off sedation, seems a poor prognosis particularly given new infarct changes on MRI. Continue supportive treatment and IV ABx per infectious disease. Plan includes possible formal operative I&D with hardware removal if medically improves versus de-escalation of care pending family discussion. Agree with above. Contact ortho with any questions or concerns.     Marilyn Perez, DO  Orthopedic Surgery, PGY-3  Formerly [facility]

## 2019-04-15 NOTE — PROGRESS NOTES
NEUROLOGY INPATIENT PROGRESS NOTE    4/15/2019         Subjective: Jodie Dyson is a  46 y.o. male admitted on 4/7/2019. Chart reviewed. Discussed with RN. Patient examined. Patient has been off of Fentanyl drip since 0400. Was on Propofol from 2820-9706 and it has been off since that time. Patient will open his eyes but not to command. Has cough and gag but is not breathing over the vent. No spontaneous movement per nursing staff. MRI brain with multi infarct state, concern of L ICA severe stenosis vs occlusion. Carotid US pending. HPI: Briefly, this is a  46 y.o. male who was admitted on 4/7/2019 with cardiac arrest. Per the records the patient called EMS for worsening shortness of breath; on EMS arrival patient was found to have audible rales, EKG concerning for STEMI. When EMS was arriving to ER the patient lost pulses, compressions were started with an initial rhythm of PEA. Patient received continued CPR and epi x2, ROSC after 3 rounds of CPR. Patient was taken emergently to cath lab where they found multivessel disease and he arrested again, was able to be resuscitated. He was then transferred to the OR immediately for CABG, he arrested again on arrival to the OR and was placed on coronary bypass. The patient is s/p CABG x4 on 4/7. He had chest closure on 4/8. Per the notes, the patient was able to follow commands, had spontaneous movement as he was trying to pull at his tubes on 4/10. Sedation was increased at that time due to patient irritability, and has since been weaned off. RN reports both Precedex and Versed drips have been off since 4/11 at 8am. Since that time the patient has not responded to commands, has not had spontaneous movement. He does have pupil reactivity, corneals, cough and gag, but is not breathing over the vent, per nursing staff.  A CT head was done today showing scattered areas of encephalomalacia to bifrontal and high left parietal areas consistent with remote infarct/trauma, area

## 2019-04-15 NOTE — PROGRESS NOTES
[facility]   Progress Note                   Date:   4/15/2019  Patient name: Jodie Dyson  Date of admission:  4/7/2019  4:48 AM  MRN:   [id number]  YOB: 1965  PCP: No primary care provider on file. Reason for Admission: STEMI    Subjective:       S/P emergent CABG, POD#8    PEA Arrest, late presentation of STEMI with emergent bypass. Post op course complicated by neurological insult and poor neurological recovery with multiple areas of ischemic stroke    Afebrile. SR  SBP in 150    I/O last 3 completed shifts:   In: 1747.7 [I.V.:854.7; NG/GT:893]  Out: 2365 [Urine:2265; Drains:100]  I/O this shift:  In: -   Out: 225 [Urine:225]      Medications:   Scheduled Meds:   metoprolol tartrate  50 mg Oral BID    insulin glargine  20 Units Subcutaneous Daily    amLODIPine  5 mg Oral Daily    insulin lispro  0-12 Units Subcutaneous Q6H    famotidine  20 mg Oral BID    enoxaparin  40 mg Subcutaneous Daily    nystatin  5 mL Oral 4x Daily    ceftaroline fosamil (TEFLARO) IVPB  400 mg Intravenous Q12H    aspirin  81 mg Oral Daily    docusate  100 mg Oral Daily    dextrose  12.5 g Intravenous Once    albuterol  2.5 mg Nebulization Q6H    sodium chloride flush  10 mL Intravenous 2 times per day    sodium chloride flush  10 mL Intravenous 2 times per day    polyethylene glycol  17 g Oral Daily    chlorhexidine  15 mL Mouth/Throat BID    therapeutic multivitamin-minerals  1 tablet Oral Daily with breakfast    atorvastatin  40 mg Oral Nightly    clopidogrel  75 mg Oral Daily       Continuous Infusions:   fentaNYL Stopped (04/15/19 0355)    propofol 25 mcg/kg/min (04/15/19 0952)    midazolam Stopped (04/14/19 1730)    sodium chloride 20 mL/hr (04/14/19 1141)    dextrose         CBC:   Recent Labs     04/13/19 0546 04/14/19  0517 04/15/19  0516   WBC 16.5* 17.4* 17.5*   HGB 9.3* 9.6* 9.1*    222 252     BMP:    Recent Labs     04/13/19 0546 04/14/19 0517 04/15/19  0516  142 144   K 4.6 4.5 4.2   * 112* 114*   CO2 18* 22 23   BUN 48* 30* 27*   CREATININE 1.69* 0.67* 0.52*   GLUCOSE 287* 287* 255*     Hepatic:   Recent Labs     04/12/19  1124 04/14/19  0517   AST 3,077* 467*   ALT 3,818* 1,843*   BILITOT 1.90* 1.48*   ALKPHOS 71 151*     Troponin: No results for input(s): TROPONINI in the last 72 hours. BNP: No results for input(s): BNP in the last 72 hours. Lipids: No results for input(s): CHOL, HDL in the last 72 hours. Invalid input(s): LDLCALCU  INR:   Recent Labs     04/13/19  0546 04/14/19  0517 04/15/19  0516   INR 1.2 1.2 1.1       Objective:   Vitals: BP (!) 143/61   Pulse 89   Temp 98.1 °F (36.7 °C) (Oral)   Resp 17   Ht 5' 8\" (1.727 m)   Wt 180 lb 1.9 oz (81.7 kg)   SpO2 97%   BMI 27.39 kg/m²   General appearance: intubated, but not sedated. Does not follow commands  HEENT: Head: Normocephalic, no lesions,   Bilateral bruist   Neck: no JVD  Lungs:  Mechanical ventilator  Heart: regular rate and rhythm, S1, S2 normal, no murmur, click, rub or gallop  Abdomen: soft, non-tender; bowel sounds normal  Extremities: No LE edema  Neurologic: Grimaces to pain. Does not follow commands      EKG: Sinus tachycardia with anteroseptal infarct      Coronary Angiography: 4/7/19  LMCA: has distal 60% stenosis. LAD: has ostial 90% stenosis and long proximal 80% stenosis. D1 has proximal 70% stenosis. LCx: has 20% stenosis. OM1 is very small. OM2 has proximal 90% stenosis. RCA: has proximal 50% stenosis, mid 60% stenosis and distal 80% stenosis. Ramus: has ostial 99% stenosis.     The LV gram was performed in the DUTTON 30 position. LVEF: 15%. LV Wall Motion: severe hypokinesis of the anterior wall and mid inferior wall with apical akinesis. Echo 4/12/19:  Left ventricle is normal in size. Global left ventricular systolic function is mildly reduced. Calculated ejection fraction is 40. Left ventricular wall thickness is mildly increased. Grade II diastolic dysfunction  Normal valvular function      Assessment:   1. PEA Arrest (Multiple times) with ROSC in few minutes - off sedation and paralytics  2. Late presentation of anteroseptal STEMI   3. Multivessel CAD s/p emergent CABG on 4/7 with LIMA-LAD, SVG-PDA,OM 1 & distal circumflex). 4. S/P sternal closure on 4/8  5. Ischemic cardiomyopathy with acute systolic CHF (EF 74% on ANGELA and TTE)  6. NSVT  7. Post op anemia  8. Post op thrombocytopenia  9. Acute pulmonary edema s/p intubation  10. Sepsis   11. Acute renal failure - creatinine improving slowly  12. Metabolic encephalopathy  13. Exposed orthopedic hardware S/P removal of Protruding orthopedic nail from Rt lateral knee area. Old blood and malodor. 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 14. S/P ORIF Rt proximal tibia. Non compliant with follow up. 15. Shock Liver with AST and ALT in 3000s (AST 3077, ALT 3818)  16. Gallbladder sludge with concern for cholecystitis  17. Multiple embolic acute infarcts in brain( 4/14)  18. Left internal carotid artery occlusion      Treatment Plan:   1 need to discuss goals of care with poor neurological recovery. will defer neurologist to discuss about the goals  2 SR. Hemodynamically stable. continue with lopressor 50 BID and amlodipine 5 for the patient. If BP not controlled, can increase the amlodipine to 10 mg, but in setting of acute stroke, will keep patient BP on higher side  3 will continue with asa and plavix for the patient. 4 ceftaroline per ID  5 diuresis per nephrology    Electronically signed on 04/15/19 at 10:06 AM by:    Ace Baeza MD   Fellow, [address]      Attending Physician Statement  I have discussed the care of the patient, including pertinent history and exam findings, with the resident. I have seen and examined the patient and the key elements of all parts of the encounter have been performed by me.  I agree with the assessment, plan and orders as documented by the resident.   Still critical   Karyna Gupta MD

## 2019-04-15 NOTE — CARE COORDINATION
Transition planning patient remains intubated medicaid bradley # and ssn # in previous cm note.  Plan tbd.   12:00 met with son Pebbles Marsh hasn't made a decision regarding snf

## 2019-04-15 NOTE — PROGRESS NOTES
line Insertion:  · Patient will need: Home IV , [place],  SNF,  LTAC: TBD  · Patient will need outpatient wound care:TBD    Chief complaint/reason for consultation:   · Emergent CABG  · Protruding orthopedic nail from Rt knee      History of Present Illness:   Shanell Richardson is a 46y.o.-year-old  male who was initially admitted on 4/7/2019. Patient seen at the request of [facility]. INITIAL HISTORY:     Patient apparently developed chest pain and called EMS. Had cardiac arrest at the scene, was resuscitated and brought to ER where he had second cardiac arrest. Was resuscitated once again and taken to OR for emergent CABG x 4. He had another cardiac arrest in OR. Has survived the above and is back in the ICU. Found to have area of malodor on lateral aspect Rt knee, corresponding to a protruding orthopedic screw from a remote ORIF of the Rt  Tibial plateau. The screw has been removed and sent for culture. X rays show healed fracture of the proximal tibia. There is additional hardware in place that will likely need to be removed if patient survives. CURRENT EVALUATION : 4/15/2019    Afebrile in last 24 hours  VS stable . Off dobutamine. Remains intubated. Sedated. Off propofol. Not responding to commands or withdrawing from pain. CT of head ordered, showed scattered infarcts of different ages. MRI follow up 4/14 showed multiple new embolic strokes. Absent flow Lt carotid artery. Ultrasound 4-15-19 with occlusion of Lt internal carotid artery. Leg screw cultures found. Direct exam showed many gram + cocci in clusters and few gram - rods, NGTD. Lab reports quality of sample is questionable and to interpret results carefully. Blood cultures 4/11 x 2 with no growth to date. Urine culture 4/12 with no growth to date. Sputum culture 4/12 with mixed bacterial morphotypes. WBCs trended up to 17.5 today. Hgb stable at 9.1.      ALMITA has resolved with creatinine of 0.52 STERNAL  WOUND WASHOUT AND CLOSURE S/P CABG performed by Nicole Baumann MD at [address] [facility] off roof, external fixation and internal hardware    TONSILLECTOMY         Medications:      metoprolol tartrate  50 mg Oral BID    insulin glargine  20 Units Subcutaneous Daily    amLODIPine  5 mg Oral Daily    insulin lispro  0-12 Units Subcutaneous Q6H    famotidine  20 mg Oral BID    enoxaparin  40 mg Subcutaneous Daily    nystatin  5 mL Oral 4x Daily    ceftaroline fosamil (TEFLARO) IVPB  400 mg Intravenous Q12H    aspirin  81 mg Oral Daily    docusate  100 mg Oral Daily    dextrose  12.5 g Intravenous Once    albuterol  2.5 mg Nebulization Q6H    sodium chloride flush  10 mL Intravenous 2 times per day    sodium chloride flush  10 mL Intravenous 2 times per day    polyethylene glycol  17 g Oral Daily    chlorhexidine  15 mL Mouth/Throat BID    therapeutic multivitamin-minerals  1 tablet Oral Daily with breakfast    atorvastatin  40 mg Oral Nightly    clopidogrel  75 mg Oral Daily       Social History:     Social History     Socioeconomic History    Marital status: Unknown     Spouse name: Not on file    Number of children: Not on file    Years of education: Not on file    Highest education level: Not on file   Occupational History    Not on file   Social Needs    Financial resource strain: Not on file    Food insecurity:     Worry: Not on file     Inability: Not on file    Transportation needs:     Medical: Not on file     Non-medical: Not on file   Tobacco Use    Smoking status: Not on file   Substance and Sexual Activity    Alcohol use: Not on file    Drug use: Not on file    Sexual activity: Not on file   Lifestyle    Physical activity:     Days per week: Not on file     Minutes per session: Not on file    Stress: Not on file   Relationships    Social connections:     Talks on phone: Not on file     Gets together: Not on file Attends Congregation service: Not on file     Active member of club or organization: Not on file     Attends meetings of clubs or organizations: Not on file     Relationship status: Not on file    Intimate partner violence:     Fear of current or ex partner: Not on file     Emotionally abused: Not on file     Physically abused: Not on file     Forced sexual activity: Not on file   Other Topics Concern    Not on file   Social History Narrative    Not on file       Family History:   History reviewed. No pertinent family history. Allergies:   Patient has no allergy information on record. Review of Systems:   Unable to provide. Sedated on ventilator. Physical Examination :     Patient Vitals for the past 8 hrs:   BP Temp Temp src Pulse Resp SpO2 Weight   04/15/19 0745 -- -- -- 80 -- 98 % --   04/15/19 0740 -- -- -- 82 18 98 % --   04/15/19 0736 -- -- -- 85 -- 98 % --   04/15/19 0735 -- -- -- 84 -- 98 % --   04/15/19 0730 -- -- -- 84 -- 91 % --   04/15/19 0717 -- 98.1 °F (36.7 °C) Oral 82 -- -- --   04/15/19 0715 -- -- -- 82 -- 98 % --   04/15/19 0709 -- 98.6 °F (37 °C) Axillary -- -- -- --   04/15/19 0700 (!) 121/55 -- -- 80 -- 98 % --   04/15/19 0600 (!) 124/56 -- -- 81 -- 97 % --   04/15/19 0500 (!) 131/59 -- -- 80 -- 97 % 180 lb 1.9 oz (81.7 kg)   04/15/19 0406 -- 98.6 °F (37 °C) Oral -- -- -- --   04/15/19 0400 (!) 142/66 -- -- 85 -- 97 % --   04/15/19 0340 -- -- -- 81 19 100 % --   04/15/19 0300 (!) 117/55 -- -- 79 -- 99 % --   04/15/19 0200 -- -- -- 76 -- -- --   04/15/19 0100 (!) 108/53 -- -- 67 -- 100 % --     General Appearance: Sedated on vent  Head:  Normocephalic, no trauma  Eyes: Pupils equal, round, reactive to light; sclera anicteric; conjunctivae pink. No embolic phenomena. ENT: Oropharynx clear, without erythema, exudate, or thrush. No tenderness of sinuses. Mouth/throat: mucosa pink and moist. No lesions. Dentition in good repair. Neck:Supple, without lymphadenopathy.  Thyroid normal, No bruits. Pulmonary/Chest: Coarse sounds  Cardiovascular: Regular rate and rhythm without murmurs, rubs, or gallops. Abdomen: Soft, non tender. Bowel sounds normal. No organomegaly  All four Extremities: No cyanosis, clubbing, edema, or effusions. Neurologic: Unable to assess. Sedated. Skin: Warm and dry with good turgor. Signs of peripheral arterial insufficiency. Rt lateral knee with open incision with old blood at site of prior protruding screw. Chest incision closed. Medical Decision Making -Laboratory:   I have independently reviewed/ordered the following labs:    CBC with Differential:   Recent Labs     04/14/19  0517 04/15/19  0516   WBC 17.4* 17.5*   HGB 9.6* 9.1*   HCT 30.8* 28.8*    252     BMP:   Recent Labs     04/14/19 0517 04/15/19  0516    144   K 4.5 4.2   * 114*   CO2 22 23   BUN 30* 27*   CREATININE 0.67* 0.52*   MG 2.2 2.1     Hepatic Function Panel:   Recent Labs     04/12/19  1124 04/12/19  1332 04/14/19  0517   PROT 4.8* 4.3* 5.5*   LABALBU 2.7*  --  2.7*   BILIDIR 1.30*  --  0.85*   IBILI 0.60  --  0.63   BILITOT 1.90*  --  1.48*   ALKPHOS 71  --  151*   ALT 3,818*  --  1,843*   AST 3,077*  --  467*     No results for input(s): RPR in the last 72 hours. No results for input(s): HIV in the last 72 hours. No results for input(s): BC in the last 72 hours.   Lab Results   Component Value Date    MUCUS NOT REPORTED 04/14/2019    RBC 3.03 04/15/2019    TRICHOMONAS NOT REPORTED 04/14/2019    WBC 17.5 04/15/2019    YEAST NOT REPORTED 04/14/2019    TURBIDITY CLOUDY 04/14/2019     Lab Results   Component Value Date    CREATININE 0.52 04/15/2019    GLUCOSE 255 04/15/2019       Medical Decision Making-Imaging:     EXAMINATION:   3 XRAY VIEWS OF THE RIGHT KNEE       4/7/2019 5:00 pm       COMPARISON:   04/07/2019       HISTORY:   ORDERING SYSTEM PROVIDED HISTORY: s/p Hardware removal at bedside   TECHNOLOGIST PROVIDED HISTORY:   s/p Hardware removal at bedside       FINDINGS: Interval removal of anterior superior fixating screw.  No periprosthetic   fracture.  Chronic deformity of the proximal fibula.  Degenerative changes of   knee and tib-fib syndesmosis.         Impression   Removal of anterior-superior fixating screw without interval change otherwise     EXAMINATION:   3 XRAY VIEWS OF THE RIGHT KNEE       4/7/2019 3:28 pm       COMPARISON:   None.       HISTORY:   ORDERING SYSTEM PROVIDED HISTORY: Trauma/Fracture   TECHNOLOGIST PROVIDED HISTORY:   Trauma/Fracture       FINDINGS:   Sideplate and 4 screws transfix the proximal tibia laterally.  Old healed   fracture proximal fibular neck and head.  Spurring lateral tibia plateau. Proximal tibiofibular syndesmosis.  Visualized femur and patella normal.   Soft tissues unremarkable.           Impression   ORIF proximal tibia.  No acute fracture.         EXAMINATION:   SINGLE XRAY VIEW OF THE CHEST       4/10/2019 6:47 am       COMPARISON:   Chest radiograph performed 04/09/2019.       HISTORY:   ORDERING SYSTEM PROVIDED HISTORY: post op   TECHNOLOGIST PROVIDED HISTORY:   post op       FINDINGS:   There is redemonstration of bilateral pulmonary congestion/edema that is   similar compared to prior.  There are bibasilar effusions. Julia Carlyle is no   pneumothorax.  The heart is prominent.  The upper abdomen is unremarkable. The extrathoracic soft tissues are unremarkable. Julia Carlyle is an endotracheal   tube with the tip in the midtrachea.  There is a gastric tube with the tip in   the proximal aspect of the stomach.  The right internal jugular line has been   removed.  The Kingston-Carly catheter has been removed.           Impression   Cardiomegaly and bilateral pulmonary congestion/edema that is similar   compared to prior.       Bibasilar effusions and stable endotracheal tube and gastric tube.             Medical Decision Making-Other: Thank you for allowing us to participate in the care of this patient.  Please call with

## 2019-04-15 NOTE — PROGRESS NOTES
[id number] [facility]  Daily Progress Note  Surgeon:  Dr. Demario Jaquez     POD# 8  S/P :  Emerg Coronary artery bypass 4/7/19  Chest closure 4/8/19  EF: 45 %    Subjective:  Mr. Tatyana Small is intubated.      Physical Exam  Vital Signs: BP (!) 124/56   Pulse 81   Temp 98.1 °F (36.7 °C) (Oral)   Resp 19   Ht 5' 8\" (1.727 m)   Wt 180 lb 1.9 oz (81.7 kg)   SpO2 97%   BMI 27.39 kg/m²      Admit Weight: Weight: 176 lb 5.9 oz (80 kg)   WEIGHTWeight: 180 lb 1.9 oz (81.7 kg)       Scheduled Meds:    metoprolol tartrate  50 mg Oral BID    insulin glargine  20 Units Subcutaneous Daily    amLODIPine  5 mg Oral Daily    insulin lispro  0-12 Units Subcutaneous Q6H    famotidine  20 mg Oral BID    enoxaparin  40 mg Subcutaneous Daily    nystatin  5 mL Oral 4x Daily    ceftaroline fosamil (TEFLARO) IVPB  400 mg Intravenous Q12H    aspirin  81 mg Oral Daily    docusate  100 mg Oral Daily    dextrose  12.5 g Intravenous Once    albuterol  2.5 mg Nebulization Q6H    sodium chloride flush  10 mL Intravenous 2 times per day    sodium chloride flush  10 mL Intravenous 2 times per day    polyethylene glycol  17 g Oral Daily    chlorhexidine  15 mL Mouth/Throat BID    therapeutic multivitamin-minerals  1 tablet Oral Daily with breakfast    atorvastatin  40 mg Oral Nightly    clopidogrel  75 mg Oral Daily     Continuous Infusions:    fentaNYL Stopped (04/15/19 0355)    propofol 20 mcg/kg/min (04/15/19 0406)    midazolam Stopped (04/14/19 1050)    sodium chloride 20 mL/hr (04/14/19 1141)    dextrose         Data:  CBC:   Recent Labs     04/13/19  0546 04/14/19  0517 04/15/19  0516   WBC 16.5* 17.4* 17.5*   HGB 9.3* 9.6* 9.1*   HCT 29.1* 30.8* 28.8*   MCV 94.5 96.0 95.0    222 252     BMP:   Recent Labs     04/13/19  0546 04/14/19 0517 04/15/19  0516    142 144   K 4.6 4.5 4.2   * 112* 114*   CO2 18* 22 23   BUN 48* 30* 27*   CREATININE 1.69* 0.67* 0.52*     PT/INR:   Recent Labs 04/13/19  0546 04/14/19  0517 04/15/19  0516   PROTIME 12.7* 12.9* 11.7   INR 1.2 1.2 1.1     APTT: No results for input(s): APTT in the last 72 hours. Chest X-Ray:  4/10/19: There is redemonstration of bilateral pulmonary congestion/edema that is   similar compared to prior. Prasad Coffer are bibasilar effusions. Prasad Coffer is no   pneumothorax.  The heart is prominent.  The upper abdomen is unremarkable. The extrathoracic soft tissues are unremarkable. Prasad Coffer is an endotracheal   tube with the tip in the midtrachea.  There is a gastric tube with the tip in   the proximal aspect of the stomach.  The right internal jugular line has been   removed.  The [employer]-C[employer] catheter has been removed.           Impression   Cardiomegaly and bilateral pulmonary congestion/edema that is similar   compared to prior.       Bibasilar effusions and stable endotracheal tube and gastric tube.             I/O:  I/O last 3 completed shifts: In: 2879.7 [I.V.:1543.7; NG/GT:1336]  Out: 0 [Urine:2510; Drains:100]      Assessment & Plan:   Patient Active Problem List   Diagnosis    Cardiac arrest ([address]    Acute ST elevation myocardial infarction (STEMI) of anterior wall ([address]    Severe left ventricular systolic dysfunction    Acute respiratory failure with hypoxemia ([address]    Cardiogenic pulmonary edema (HCC)    PAD (peripheral artery disease) (HCC)    Subacute osteomyelitis of right tibia ([address]    Exposed orthopaedic hardware ([address]    Metabolic encephalopathy    Abnormal CT of the head    Fever chills    Hepatitis     1. S/p Emergent CABG   POD 8  2. HD - stable no pressors  3. Pulmo - sedated, intubated, keep intubated   4. GI - hypoactive TF on hold for HIDA scan  5.  - good UOP  6. Neuro - grimace to pain   7. ABLA - stable  8. Ortho following for right knee infection  9. MRI yday new embolic events  10. Discuss code with family  6.  Diminished peripheral pulses vascular following    Helena Esparza

## 2019-04-15 NOTE — PLAN OF CARE
Problem: Falls - Risk of:  Goal: Will remain free from falls  Description  Will remain free from falls  Outcome: Ongoing  Goal: Absence of physical injury  Description  Absence of physical injury  Outcome: Ongoing     Problem: SKIN INTEGRITY  Goal: Skin integrity is maintained or improved  Outcome: Ongoing

## 2019-04-15 NOTE — PROGRESS NOTES
([facility] Social History:        Family History: History reviewed. No pertinent family history. Vitals:  BP (!) 105/56   Pulse 94   Temp 99.3 °F (37.4 °C) (Axillary)   Resp 14   Ht 5' 8\" (1.727 m)   Wt 180 lb 1.9 oz (81.7 kg)   SpO2 95%   BMI 27.39 kg/m²   Temp (24hrs), Av.5 °F (36.9 °C), Min:98 °F (36.7 °C), Max:99.3 °F (37.4 °C)    Recent Labs     19  1118 19  2139 04/15/19  0515 04/15/19  0956   POCGLU 269* 210* 255* 153*     I/O (24Hr):     Intake/Output Summary (Last 24 hours) at 4/15/2019 1605  Last data filed at 4/15/2019 1300  Gross per 24 hour   Intake 1738 ml   Output 1480 ml   Net 258 ml       Labs:    Lab Results   Component Value Date/Time    SPECIAL NOT REPORTED 2019 07:55 AM     Lab Results   Component Value Date/Time    CULTURE NO GROWTH 2019 07:55 AM     Lab Results   Component Value Date    POCPH 7.497 04/15/2019    POCPCO2 32.2 04/15/2019    POCPO2 65.7 04/15/2019    POCHCO3 25.0 04/15/2019    NBEA NOT REPORTED 04/15/2019    PBEA 2 04/15/2019    BTO0OWN 26 04/15/2019    TUBS5DFW 94 04/15/2019    FIO2 40.0 04/15/2019     Physical Examination:        General appearance: sedated, not able to open eyes at my visit, having hiccups  Mental Status: un able to assess  Lungs:  clear to auscultation bilaterally, normal effort  Heart:  regular rate and rhythm, no murmur  Abdomen:  soft, nontender, nondistended, normal bowel sounds  Extremities:  no edema, redness, tenderness in the calves  Skin:  no gross lesions, rashes, induration    Assessment:        Primary Problem  Cardiac arrest [facility])    Active Hospital Problems    Diagnosis Date Noted    Acute cerebral infarction associated with systemic hypoxia or ischemia [I63.89]     Fever chills [R50.9]     Hepatitis [C90.2]     Metabolic encephalopathy [Z16.84]     Abnormal CT of the head [R93.0]     Cardiac arrest ([facility] [I46.9] 2019    Severe left ventricular systolic dysfunction [S47.0] 2019    Acute

## 2019-04-15 NOTE — PROCEDURES
[address] [facility]                          ELECTROENCEPHALOGRAM REPORT    PATIENT NAME: Gage aBrnard                        :        1965  MED REC NO:   [id number]                             ROOM:       0799  ACCOUNT NO:   [de-identified]                           ADMIT DATE: 2019  PROVIDER:     Eirca Yee    DATE OF EE2019    ATTENDING ON RECORD:  Dr. Rayna Giron and Dr. Nicol Infante. REASON FOR STUDY:  This is a 21-year-old gentleman with encephalopathy  post cardiac arrest and open heart surgery. MEDICATIONS:  Include Reglan, Pepcid, Colace, Lasix, Proventil,  fentanyl, Lipitor, Plavix, Levophed, Esequiel-Synephrine. A 16-channel EEG with one EKG channel recording performed on a patient  described to be with decreased responsiveness on the ventilator. The  patient shows delayed rhythms. Background activity consists of moderate  degree of medium-amplitude 4- to 7-Hz activity admixed with lesser  degree of 2- to 3-Hz activity seen throughout all head areas. This is  furthermore intermixed by spindle activity of alpha and beta frequencies  seen intermittently throughout all head regions. No lateralized or  epileptiform disturbance is seen. Hyperventilation and photic  stimulation were not performed. IMPRESSION:  This EEG shows the presence of moderate diffuse slowing. This EEG is concordant with diffuse encephalopathy. No clear  lateralized or epileptiform disturbance is seen.         Bro Neves    D: 2019 18:02:41       T: 2019 19:39:09     [id number]  Job#: [id number]     Doc#: [id number]    CC:

## 2019-04-15 NOTE — PLAN OF CARE
Problem: OXYGENATION/RESPIRATORY FUNCTION  Goal: Patient will maintain patent airway  Outcome: Ongoing  Goal: Patient will achieve/maintain normal respiratory rate/effort  Description  Respiratory rate and effort will be within normal limits for the patient  Outcome: Ongoing     Problem: MECHANICAL VENTILATION  Goal: Patient will maintain patent airway  Outcome: Ongoing  Goal: Oral health is maintained or improved  Outcome: Ongoing  Goal: ET tube will be managed safely  Outcome: Ongoing  Goal: Ability to express needs and understand communication  Outcome: Ongoing  Goal: Mobility/activity is maintained at optimum level for patient  Outcome: Ongoing     Problem: Falls - Risk of:  Goal: Will remain free from falls  Description  Will remain free from falls  4/15/2019 0735 by Dimple Garcia RN  Outcome: Ongoing  Goal: Absence of physical injury  Description  Absence of physical injury  4/15/2019 0735 by Dimple Garcia RN  Outcome: Ongoing     Problem: SKIN INTEGRITY  Goal: Skin integrity is maintained or improved  4/15/2019 0749 by Luann Kirkpatrick RCP  Outcome: Ongoing  4/15/2019 0735 by Dimple Garcia RN  Outcome: Ongoing

## 2019-04-15 NOTE — PROGRESS NOTES
Physical Therapy  DATE: 4/15/2019    NAME: Abe Hairston  MRN: [id number]   : 1965    Patient not seen this date for Physical Therapy due to:  [] Blood transfusion in progress  [] Hemodialysis  []  Patient Declined  [] Spine Precautions   [] Strict Bedrest  [] Surgery/ Procedure  [x] Testing: Pt receiving Hida Scan, Will check back in the PM.     [] Other        [] PT being discontinued at this time. Patient independent. No further needs. [] PT being discontinued at this time as the patient has been transferred to palliative care. No further needs. VEE Hawk  Treatment performed by Student PTA under the supervision of co-signing PTA who agrees with all treatment and documentation.    Emmy Taylor PTA

## 2019-04-15 NOTE — PROGRESS NOTES
04/15/19 0935   Patient Transport   Time spent transporting Other  (90)   Transport ventillation type Transport vent   Transport from ICU  (CAR 1)   Transport destination Other  (Nuclear Medicine)   Emergency equipment included Yes       The transport originated from [address], room 1012. Pt. was transported to Nuclear Medicine. Assisting with the transport was RNs. Appropriate devices were applied to monitor the patient's condition during transport. Patient transported  via 40% O2 via ventilator. Patient tolerated well.         Chepe Backbone  9:49 AM

## 2019-04-16 LAB
ALBUMIN SERPL-MCNC: 2.7 G/DL (ref 3.5–5.2)
ALBUMIN/GLOBULIN RATIO: 0.8 (ref 1–2.5)
ALLEN TEST: ABNORMAL
ALP BLD-CCNC: 197 U/L (ref 40–129)
ALT SERPL-CCNC: 795 U/L (ref 5–41)
ANION GAP SERPL CALCULATED.3IONS-SCNC: 12 MMOL/L (ref 9–17)
ANION GAP SERPL CALCULATED.3IONS-SCNC: 15 MMOL/L (ref 9–17)
AST SERPL-CCNC: 246 U/L
BILIRUB SERPL-MCNC: 1.3 MG/DL (ref 0.3–1.2)
BILIRUBIN DIRECT: 0.61 MG/DL
BILIRUBIN, INDIRECT: 0.69 MG/DL (ref 0–1)
BUN BLDV-MCNC: 25 MG/DL (ref 6–20)
BUN BLDV-MCNC: 26 MG/DL (ref 6–20)
BUN/CREAT BLD: ABNORMAL (ref 9–20)
BUN/CREAT BLD: ABNORMAL (ref 9–20)
CALCIUM SERPL-MCNC: 8.7 MG/DL (ref 8.6–10.4)
CALCIUM SERPL-MCNC: 8.9 MG/DL (ref 8.6–10.4)
CHLORIDE BLD-SCNC: 110 MMOL/L (ref 98–107)
CHLORIDE BLD-SCNC: 115 MMOL/L (ref 98–107)
CO2: 19 MMOL/L (ref 20–31)
CO2: 22 MMOL/L (ref 20–31)
CREAT SERPL-MCNC: 0.48 MG/DL (ref 0.7–1.2)
CREAT SERPL-MCNC: 0.52 MG/DL (ref 0.7–1.2)
FIO2: 40
GFR AFRICAN AMERICAN: >60 ML/MIN
GFR AFRICAN AMERICAN: >60 ML/MIN
GFR NON-AFRICAN AMERICAN: >60 ML/MIN
GFR NON-AFRICAN AMERICAN: >60 ML/MIN
GFR SERPL CREATININE-BSD FRML MDRD: ABNORMAL ML/MIN/{1.73_M2}
GLOBULIN: ABNORMAL G/DL (ref 1.5–3.8)
GLUCOSE BLD-MCNC: 171 MG/DL (ref 75–110)
GLUCOSE BLD-MCNC: 246 MG/DL (ref 75–110)
GLUCOSE BLD-MCNC: 262 MG/DL (ref 75–110)
GLUCOSE BLD-MCNC: 284 MG/DL (ref 74–100)
GLUCOSE BLD-MCNC: 287 MG/DL (ref 75–110)
GLUCOSE BLD-MCNC: 288 MG/DL (ref 75–110)
GLUCOSE BLD-MCNC: 297 MG/DL (ref 70–99)
GLUCOSE BLD-MCNC: 324 MG/DL (ref 70–99)
HCT VFR BLD CALC: 31.2 % (ref 40.7–50.3)
HCT VFR BLD CALC: 31.7 % (ref 40.7–50.3)
HEMOGLOBIN: 10 G/DL (ref 13–17)
HEMOGLOBIN: 10.1 G/DL (ref 13–17)
INR BLD: 1.2
INR BLD: 1.2
MAGNESIUM: 1.9 MG/DL (ref 1.6–2.6)
MAGNESIUM: 2 MG/DL (ref 1.6–2.6)
MCH RBC QN AUTO: 29.9 PG (ref 25.2–33.5)
MCH RBC QN AUTO: 30.7 PG (ref 25.2–33.5)
MCHC RBC AUTO-ENTMCNC: 31.5 G/DL (ref 28.4–34.8)
MCHC RBC AUTO-ENTMCNC: 32.4 G/DL (ref 28.4–34.8)
MCV RBC AUTO: 94.6 FL (ref 82.6–102.9)
MCV RBC AUTO: 94.8 FL (ref 82.6–102.9)
MODE: ABNORMAL
NEGATIVE BASE EXCESS, ART: ABNORMAL (ref 0–2)
NRBC AUTOMATED: 0 PER 100 WBC
NRBC AUTOMATED: 0.1 PER 100 WBC
O2 DEVICE/FLOW/%: ABNORMAL
PATIENT TEMP: ABNORMAL
PDW BLD-RTO: 14.9 % (ref 11.8–14.4)
PDW BLD-RTO: 15.1 % (ref 11.8–14.4)
PLATELET # BLD: 331 K/UL (ref 138–453)
PLATELET # BLD: 348 K/UL (ref 138–453)
PMV BLD AUTO: 12.1 FL (ref 8.1–13.5)
PMV BLD AUTO: 12.4 FL (ref 8.1–13.5)
POC HCO3: 24.3 MMOL/L (ref 21–28)
POC O2 SATURATION: 96 % (ref 94–98)
POC PCO2 TEMP: ABNORMAL MM HG
POC PCO2: 31 MM HG (ref 35–48)
POC PH TEMP: ABNORMAL
POC PH: 7.5 (ref 7.35–7.45)
POC PO2 TEMP: ABNORMAL MM HG
POC PO2: 73.4 MM HG (ref 83–108)
POSITIVE BASE EXCESS, ART: 2 (ref 0–3)
POTASSIUM SERPL-SCNC: 4.2 MMOL/L (ref 3.7–5.3)
POTASSIUM SERPL-SCNC: 4.3 MMOL/L (ref 3.7–5.3)
PROTHROMBIN TIME: 12.4 SEC (ref 9–12)
PROTHROMBIN TIME: 12.6 SEC (ref 9–12)
RBC # BLD: 3.29 M/UL (ref 4.21–5.77)
RBC # BLD: 3.35 M/UL (ref 4.21–5.77)
SAMPLE SITE: ABNORMAL
SODIUM BLD-SCNC: 144 MMOL/L (ref 135–144)
SODIUM BLD-SCNC: 149 MMOL/L (ref 135–144)
TCO2 (CALC), ART: 25 MMOL/L (ref 22–29)
TOTAL PROTEIN: 6.3 G/DL (ref 6.4–8.3)
WBC # BLD: 25.1 K/UL (ref 3.5–11.3)
WBC # BLD: 28.6 K/UL (ref 3.5–11.3)

## 2019-04-16 PROCEDURE — 6360000002 HC RX W HCPCS: Performed by: NURSE PRACTITIONER

## 2019-04-16 PROCEDURE — 94640 AIRWAY INHALATION TREATMENT: CPT

## 2019-04-16 PROCEDURE — 6360000002 HC RX W HCPCS: Performed by: FAMILY MEDICINE

## 2019-04-16 PROCEDURE — 99232 SBSQ HOSP IP/OBS MODERATE 35: CPT | Performed by: INTERNAL MEDICINE

## 2019-04-16 PROCEDURE — 83735 ASSAY OF MAGNESIUM: CPT

## 2019-04-16 PROCEDURE — 80048 BASIC METABOLIC PNL TOTAL CA: CPT

## 2019-04-16 PROCEDURE — 6360000002 HC RX W HCPCS: Performed by: INTERNAL MEDICINE

## 2019-04-16 PROCEDURE — 2580000003 HC RX 258: Performed by: NURSE PRACTITIONER

## 2019-04-16 PROCEDURE — 94003 VENT MGMT INPAT SUBQ DAY: CPT

## 2019-04-16 PROCEDURE — 85610 PROTHROMBIN TIME: CPT

## 2019-04-16 PROCEDURE — 99233 SBSQ HOSP IP/OBS HIGH 50: CPT | Performed by: INTERNAL MEDICINE

## 2019-04-16 PROCEDURE — 6370000000 HC RX 637 (ALT 250 FOR IP): Performed by: FAMILY MEDICINE

## 2019-04-16 PROCEDURE — 6370000000 HC RX 637 (ALT 250 FOR IP): Performed by: PHYSICIAN ASSISTANT

## 2019-04-16 PROCEDURE — 2500000003 HC RX 250 WO HCPCS: Performed by: INTERNAL MEDICINE

## 2019-04-16 PROCEDURE — 99024 POSTOP FOLLOW-UP VISIT: CPT | Performed by: PHYSICIAN ASSISTANT

## 2019-04-16 PROCEDURE — 2700000000 HC OXYGEN THERAPY PER DAY

## 2019-04-16 PROCEDURE — 6370000000 HC RX 637 (ALT 250 FOR IP): Performed by: NURSE PRACTITIONER

## 2019-04-16 PROCEDURE — 94770 HC ETCO2 MONITOR DAILY: CPT

## 2019-04-16 PROCEDURE — 85027 COMPLETE CBC AUTOMATED: CPT

## 2019-04-16 PROCEDURE — 2100000001 HC CVICU R&B

## 2019-04-16 PROCEDURE — 99232 SBSQ HOSP IP/OBS MODERATE 35: CPT | Performed by: NURSE PRACTITIONER

## 2019-04-16 PROCEDURE — 82803 BLOOD GASES ANY COMBINATION: CPT

## 2019-04-16 PROCEDURE — 80076 HEPATIC FUNCTION PANEL: CPT

## 2019-04-16 PROCEDURE — 6360000002 HC RX W HCPCS: Performed by: THORACIC SURGERY (CARDIOTHORACIC VASCULAR SURGERY)

## 2019-04-16 PROCEDURE — 99291 CRITICAL CARE FIRST HOUR: CPT | Performed by: INTERNAL MEDICINE

## 2019-04-16 PROCEDURE — 2580000003 HC RX 258: Performed by: INTERNAL MEDICINE

## 2019-04-16 PROCEDURE — 6370000000 HC RX 637 (ALT 250 FOR IP): Performed by: INTERNAL MEDICINE

## 2019-04-16 PROCEDURE — 94762 N-INVAS EAR/PLS OXIMTRY CONT: CPT

## 2019-04-16 PROCEDURE — 36415 COLL VENOUS BLD VENIPUNCTURE: CPT

## 2019-04-16 RX ADMIN — Medication 5 MG: at 21:59

## 2019-04-16 RX ADMIN — HYDRALAZINE HYDROCHLORIDE 5 MG: 20 INJECTION INTRAMUSCULAR; INTRAVENOUS at 03:32

## 2019-04-16 RX ADMIN — Medication 10 ML: at 20:48

## 2019-04-16 RX ADMIN — INSULIN LISPRO 6 UNITS: 100 INJECTION, SOLUTION INTRAVENOUS; SUBCUTANEOUS at 12:21

## 2019-04-16 RX ADMIN — DIPHENHYDRAMINE HCL 25 MG: 25 TABLET ORAL at 20:47

## 2019-04-16 RX ADMIN — INSULIN GLARGINE 20 UNITS: 100 INJECTION, SOLUTION SUBCUTANEOUS at 09:27

## 2019-04-16 RX ADMIN — ENOXAPARIN SODIUM 40 MG: 40 INJECTION SUBCUTANEOUS at 09:27

## 2019-04-16 RX ADMIN — INSULIN LISPRO 4 UNITS: 100 INJECTION, SOLUTION INTRAVENOUS; SUBCUTANEOUS at 21:21

## 2019-04-16 RX ADMIN — CLOPIDOGREL 75 MG: 75 TABLET, FILM COATED ORAL at 09:27

## 2019-04-16 RX ADMIN — Medication 100 MG: at 09:27

## 2019-04-16 RX ADMIN — Medication 15 ML: at 09:27

## 2019-04-16 RX ADMIN — Medication 500000 UNITS: at 13:30

## 2019-04-16 RX ADMIN — ALBUTEROL SULFATE 2.5 MG: 2.5 SOLUTION RESPIRATORY (INHALATION) at 13:27

## 2019-04-16 RX ADMIN — ALBUTEROL SULFATE 2.5 MG: 2.5 SOLUTION RESPIRATORY (INHALATION) at 03:26

## 2019-04-16 RX ADMIN — OXYCODONE HYDROCHLORIDE AND ACETAMINOPHEN 2 TABLET: 5; 325 TABLET ORAL at 20:47

## 2019-04-16 RX ADMIN — OXYCODONE HYDROCHLORIDE AND ACETAMINOPHEN 2 TABLET: 5; 325 TABLET ORAL at 15:35

## 2019-04-16 RX ADMIN — Medication 500000 UNITS: at 09:27

## 2019-04-16 RX ADMIN — ASPIRIN 81 MG 81 MG: 81 TABLET ORAL at 09:27

## 2019-04-16 RX ADMIN — MULTIPLE VITAMINS W/ MINERALS TAB 1 TABLET: TAB at 09:27

## 2019-04-16 RX ADMIN — DESMOPRESSIN ACETATE 40 MG: 0.2 TABLET ORAL at 20:48

## 2019-04-16 RX ADMIN — INSULIN LISPRO 6 UNITS: 100 INJECTION, SOLUTION INTRAVENOUS; SUBCUTANEOUS at 16:22

## 2019-04-16 RX ADMIN — HYDRALAZINE HYDROCHLORIDE 5 MG: 20 INJECTION INTRAMUSCULAR; INTRAVENOUS at 10:19

## 2019-04-16 RX ADMIN — OXYCODONE HYDROCHLORIDE AND ACETAMINOPHEN 2 TABLET: 5; 325 TABLET ORAL at 09:56

## 2019-04-16 RX ADMIN — METOPROLOL TARTRATE 12.5 MG: 25 TABLET ORAL at 15:35

## 2019-04-16 RX ADMIN — Medication 500000 UNITS: at 16:22

## 2019-04-16 RX ADMIN — CEFTAROLINE FOSAMIL 600 MG: 600 POWDER, FOR SOLUTION INTRAVENOUS at 12:57

## 2019-04-16 RX ADMIN — HYDRALAZINE HYDROCHLORIDE 5 MG: 20 INJECTION INTRAMUSCULAR; INTRAVENOUS at 10:09

## 2019-04-16 RX ADMIN — ALBUTEROL SULFATE 2.5 MG: 2.5 SOLUTION RESPIRATORY (INHALATION) at 08:14

## 2019-04-16 RX ADMIN — ALBUTEROL SULFATE 2.5 MG: 2.5 SOLUTION RESPIRATORY (INHALATION) at 19:34

## 2019-04-16 RX ADMIN — FAMOTIDINE 20 MG: 20 TABLET, FILM COATED ORAL at 09:27

## 2019-04-16 RX ADMIN — ACETAMINOPHEN 650 MG: 325 TABLET ORAL at 01:33

## 2019-04-16 RX ADMIN — Medication 10 ML: at 10:11

## 2019-04-16 RX ADMIN — Medication 15 ML: at 20:47

## 2019-04-16 RX ADMIN — INSULIN LISPRO 6 UNITS: 100 INJECTION, SOLUTION INTRAVENOUS; SUBCUTANEOUS at 04:57

## 2019-04-16 RX ADMIN — Medication 10 MG: at 09:31

## 2019-04-16 RX ADMIN — POLYETHYLENE GLYCOL 3350 17 G: 17 POWDER, FOR SOLUTION ORAL at 09:27

## 2019-04-16 RX ADMIN — Medication 10 MG: at 12:13

## 2019-04-16 RX ADMIN — Medication 10 MG: at 03:37

## 2019-04-16 RX ADMIN — FAMOTIDINE 20 MG: 20 TABLET, FILM COATED ORAL at 20:48

## 2019-04-16 RX ADMIN — HYDRALAZINE HYDROCHLORIDE 5 MG: 20 INJECTION INTRAMUSCULAR; INTRAVENOUS at 10:26

## 2019-04-16 RX ADMIN — Medication 500000 UNITS: at 20:47

## 2019-04-16 RX ADMIN — OXYCODONE HYDROCHLORIDE AND ACETAMINOPHEN 2 TABLET: 5; 325 TABLET ORAL at 05:23

## 2019-04-16 ASSESSMENT — PULMONARY FUNCTION TESTS
PIF_VALUE: 20
PIF_VALUE: 21
PIF_VALUE: 11
PIF_VALUE: 19
PIF_VALUE: 21
PIF_VALUE: 25
PIF_VALUE: 25
PIF_VALUE: 22
PIF_VALUE: 20

## 2019-04-16 NOTE — PLAN OF CARE
Problem: OXYGENATION/RESPIRATORY FUNCTION  Goal: Patient will maintain patent airway  4/16/2019 0536 by Marty Chang RN  Outcome: Ongoing  Goal: Patient will achieve/maintain normal respiratory rate/effort  Description  Respiratory rate and effort will be within normal limits for the patient  4/16/2019 0536 by Marty Chang RN  Outcome: Ongoing  Note:   Patient remains intubated at this time. At times patient tachypneic. Outcome: Ongoing  Goal: Absence of physical injury  Description  Absence of physical injury  4/16/2019 0536 by Marty Chang RN  Note:   Bed lock and in lowest position, side rails up x 2, room free from clutter, bed alarm on and patient remains free from falls and physical injury. Problem: SKIN INTEGRITY  Goal: Skin integrity is maintained or improved  4/16/2019 0536 by Marty Chang RN  Outcome: Ongoing  Note:   Patient turn every 2 hours, bilateral heels elevated off bed, foam dressing noted on sacrum.    Problem: Cardiac Output - Decreased:  Goal: Cardiac output within specified parameters  Description  Cardiac output within specified parameters  4/16/2019 0536 by Marty Chang RN  Outcome: Ongoing  Goal: Hemodynamic stability will improve  Description  Hemodynamic stability will improve  4/16/2019 0536 by Marty Chang RN  Outcome: Ongoing

## 2019-04-16 NOTE — PROGRESS NOTES
Progress Note    Patient -   Date of Admission -  2019  4:48 AM  Date of Evaluation -  2019  Room and Bed Number -  1012/1012-01   Hospital Day - 9    CHIEF COMPLAINT :   Chief Complaint   Patient presents with    Cardiac Arrest     diff breather, brought in on cpap with elevations in EKG on EMS arrival.        SUBJECTIVE:     OVERNIGHT EVENTS:         Off sedation since yest - opens eyes spontaneously   Moves le   Moves left upper ext   bp was high and received hydralazine 4 times and labetalol   In addition to lopressor bid and norvasc 10 mg   Oral antihypertensives dc per cv   D/w son at bedside           SECRETIONS Amount:  [x] Small [] Moderate  [] Large  [] None  Color:     [x] White [] Colored  [] Bloody    SEDATION:  RAAS Score:  [] Propofol gtt  [] Versed gtt  [] Ativan gtt   [x] No Sedation    PARALYZED:  [x] No    [] Yes    VASOPRESSORS:  [x] No    [] Yes  [] Levophed [] Dopamine [] Vasopressin  [] Dobutamine [] Phenylephrine [] Epinephrine   Ros unable to perform due to intubation and ENCEPHALOPATHY     OBJECTIVE:     VITAL SIGNS:  BP (!) 132/52   Pulse 100   Temp 99 °F (37.2 °C)   Resp 14   Ht 5' 8\" (1.727 m)   Wt 180 lb 1.9 oz (81.7 kg)   SpO2 99%   BMI 27.39 kg/m²   Tmax over 24 hours:  Temp (24hrs), Av.3 °F (37.4 °C), Min:97.9 °F (36.6 °C), Max:102.4 °F (39.1 °C)      Patient Vitals for the past 8 hrs:   BP Temp Temp src Pulse Resp SpO2   19 1600 (!) 132/52 -- -- 100 14 99 %   19 1536 -- -- -- 117 18 100 %   19 1500 (!) 154/57 -- -- 112 19 94 %   19 1400 (!) 145/56 -- -- 114 18 95 %   19 1330 -- -- -- 116 19 93 %   19 1300 (!) 190/65 -- -- 118 24 93 %   19 1200 (!) 197/71 -- -- 126 23 95 %   19 1136 -- 99 °F (37.2 °C) -- -- -- --   19 1106 -- -- -- 109 19 97 %   19 1100 (!) 163/57 -- -- 105 18 97 %   19 1045 (!) 157/62 98.8 °F (37.1 °C) Oral 105 18 97 %   19 1035 (!) 158/67 -- -- 104 17 95 % 04/16/19 1030 (!) 151/59 -- -- 105 18 95 %   04/16/19 1025 (!) 169/62 -- -- 106 18 96 %   04/16/19 1000 (!) 203/67 -- -- 104 20 97 %   04/16/19 0900 (!) 190/64 -- -- 115 17 97 %   04/16/19 0830 (!) 180/60 -- -- 112 18 95 %   04/16/19 0814 -- -- -- 109 18 99 %           Intake/Output Summary (Last 24 hours) at 4/16/2019 1604  Last data filed at 4/16/2019 0900  Gross per 24 hour   Intake 1490 ml   Output 1955 ml   Net -465 ml     Date 04/16/19 0000 - 04/16/19 2359   Shift 2749-5065 5430-2302 1547-7618 24 Hour Total   INTAKE   I.V.(mL/kg) 200(2.4)   200(2.4)   NG/GT(mL/kg) 654(8)   654(8)   Shift Total(mL/kg) 854(10.5)   854(10.5)   OUTPUT   Urine(mL/kg/hr) 690(1.1) 500(0.8)  1190   Shift Total(mL/kg) 690(8.4) 500(6.1)  1190(14.6)   Weight (kg) 81.7 81.7 81.7 81.7     Wt Readings from Last 3 Encounters:   04/15/19 180 lb 1.9 oz (81.7 kg)     Body mass index is 27.39 kg/m². PHYSICAL EXAM:  Head and neck atraumatic, normocephalic  ictrus dec   Lymph nodes-no cervical, supraclavicular lymphadenopathy    Neck-no JVP elevation    Lungs - Ventilating all lobes without any rales, rhonchi, wheezes or dullness. No bronchial breath sounds. Chest expansion equal bilaterally    CVS- S1, S2 regular. No S3 no S4, no murmurs    Abdomen-nontender, nondistended. Bowel sounds are present.   No organomegaly    Lower extremity-no edema    Upper extremity-no edema    Neurological-improving see above       MEDICATIONS:  Scheduled Meds:   metoprolol tartrate  12.5 mg Per NG tube BID    ceftaroline fosamil (TEFLARO) IVPB  600 mg Intravenous Q12H    insulin glargine  20 Units Subcutaneous Daily    insulin lispro  0-12 Units Subcutaneous Q6H    famotidine  20 mg Oral BID    enoxaparin  40 mg Subcutaneous Daily    nystatin  5 mL Oral 4x Daily    aspirin  81 mg Oral Daily    docusate  100 mg Oral Daily    dextrose  12.5 g Intravenous Once    albuterol  2.5 mg Nebulization Q6H    sodium chloride flush  10 mL Intravenous 2 times per day    sodium chloride flush  10 mL Intravenous 2 times per day    polyethylene glycol  17 g Oral Daily    chlorhexidine  15 mL Mouth/Throat BID    therapeutic multivitamin-minerals  1 tablet Oral Daily with breakfast    atorvastatin  40 mg Oral Nightly    clopidogrel  75 mg Oral Daily     Continuous Infusions:   niCARdipine      propofol Stopped (04/15/19 1045)    sodium chloride 20 mL/hr (04/14/19 1141)    dextrose       PRN Meds:     sodium chloride flush 10 mL PRN   labetalol 10 mg Q6H PRN   naloxone 0.4 mg PRN   albuterol 2.5 mg Q6H PRN   sodium chloride flush 10 mL PRN   magnesium hydroxide 30 mL Daily PRN   sodium chloride flush 10 mL PRN   calcium chloride IVPB 1 g PRN   magnesium sulfate 1 g PRN   potassium chloride 20 mEq PRN   acetaminophen 650 mg Q4H PRN   acetaminophen 650 mg Q4H PRN   oxyCODONE-acetaminophen 1 tablet Q4H PRN   Or     oxyCODONE-acetaminophen 2 tablet Q4H PRN   diphenhydrAMINE 25 mg Nightly PRN   bisacodyl 10 mg Daily PRN   ondansetron 4 mg Q8H PRN   hydrALAZINE 5 mg Q5 Min PRN   albumin human 25 g PRN   glucose 15 g PRN   dextrose 12.5 g PRN   glucagon (rDNA) 1 mg PRN   dextrose 100 mL/hr PRN       SUPPORT DEVICES: [x] Ventilator [] BIPAP  [] Nasal Cannula [] Room Air    VENT SETTINGS (Comprehensive) (if applicable):      Vent Information  $Ventilation: $Subsequent Day  Ventilator Started: Yes  Skin Assessment: Clean, dry, & intact  Equipment Changed: Expiratory Filter  Vent Type: Servo i  Vent Mode: PRVC  Vt Ordered: 660 mL  Rate Set: 14 bmp  Pressure Support: (S) 6 cmH20  FiO2 : 40 %  Sensitivity: 5  PEEP/CPAP: 5  I Time/ I Time %: 0.85 s  Cuff Pressure (cm H2O): 25 cm H2O  Humidification Source: Heated wire  Humidification Temp: 35.8  Humidification Temp Measured: 35.9  Circuit Condensation: Drained  Nitric Oxide/Epoprostenol In Use?: No  Additional Respiratory  Assessments  Pulse: 100  Resp: 14  SpO2: 99 %  End Tidal CO2: 35 (%)  Position: Supine  Humidification Source: Heated wire  Humidification Temp: 35.8  Circuit Condensation: Drained  Oral Care Completed?: Yes  Oral Care: Mouth moisturizer, Mouth suctioned, Suction toothette  Subglottic Suction Done?: Yes  Cuff Pressure (cm H2O): 25 cm H2O  Skin barrier applied: Yes    ABGs:     Lab Results   Component Value Date    BOH1QTE 25 04/16/2019    FIO2 40.0 04/16/2019       DATA:  Complete Blood Count:   Recent Labs     04/15/19  0516 04/16/19  0504 04/16/19  1123   WBC 17.5* 25.1* 28.6*   RBC 3.03* 3.35* 3.29*   HGB 9.1* 10.0* 10.1*   HCT 28.8* 31.7* 31.2*   MCV 95.0 94.6 94.8   MCH 30.0 29.9 30.7   MCHC 31.6 31.5 32.4   RDW 15.3* 15.1* 14.9*    331 348   MPV 12.0 12.4 12.1        Last 3 Blood Glucose:   Recent Labs     04/14/19  0517 04/15/19  0516 04/16/19  0504 04/16/19  1123   GLUCOSE 287* 255* 297* 324*        PT/INR:    Lab Results   Component Value Date    PROTIME 12.6 04/16/2019    INR 1.2 04/16/2019     PTT:    Lab Results   Component Value Date    APTT 25.7 04/07/2019       Comprehensive Metabolic Profile:   Recent Labs     04/14/19  0517 04/15/19  0516 04/16/19  0504 04/16/19  1123    144 149* 144   K 4.5 4.2 4.3 4.2   * 114* 115* 110*   CO2 22 23 19* 22   BUN 30* 27* 26* 25*   CREATININE 0.67* 0.52* 0.48* 0.52*   GLUCOSE 287* 255* 297* 324*   CALCIUM 8.1* 8.4* 8.9 8.7   PROT 5.5*  --   --  6.3*   LABALBU 2.7*  --   --  2.7*   BILITOT 1.48*  --   --  1.30*   ALKPHOS 151*  --   --  197*   *  --   --  246*   ALT 1,843*  --   --  795*      Magnesium:   Lab Results   Component Value Date    MG 1.9 04/16/2019    MG 2.0 04/16/2019    MG 2.1 04/15/2019     Phosphorus: No results found for: PHOS  Ionized Calcium:   Lab Results   Component Value Date    CAION 0.97 04/09/2019    CAION 1.01 04/07/2019        Urinalysis:   Lab Results   Component Value Date    NITRU NEGATIVE 04/14/2019    COLORU DARK YELLOW 04/14/2019    PHUR 5.0 04/14/2019    WBCUA 0 TO 2 04/14/2019    RBCUA 2 TO 5 04/14/2019 MUCUS NOT REPORTED 04/14/2019    TRICHOMONAS NOT REPORTED 04/14/2019    YEAST NOT REPORTED 04/14/2019    BACTERIA NOT REPORTED 04/14/2019    SPECGRAV 1.024 04/14/2019    LEUKOCYTESUR NEGATIVE 04/14/2019    UROBILINOGEN Normal 04/14/2019    BILIRUBINUR NEGATIVE 04/14/2019    GLUCOSEU 3+ 04/14/2019    KETUA NEGATIVE 04/14/2019    AMORPHOUS NOT REPORTED 04/14/2019       HgBA1c:    Lab Results   Component Value Date    LABA1C 10.8 04/07/2019           ASSESSMENT:     Patient Active Problem List    Diagnosis Date Noted    Acute cerebral infarction associated with systemic hypoxia or ischemia     Multi infarct state     Fever chills     Hepatitis     Metabolic encephalopathy     Abnormal CT of the head     Cardiac arrest ([facility] 04/07/2019    Severe left ventricular systolic dysfunction 27/90/9000    Acute respiratory failure with hypoxemia ([facility]) 04/07/2019    Cardiogenic pulmonary edema ([facility]) 04/07/2019    PAD (peripheral artery disease) ([facility]) 04/07/2019    Acute ST elevation myocardial infarction (STEMI) of anterior wall ([facility])     Subacute osteomyelitis of right tibia ([facility])     Exposed orthopaedic hardware ([facility]    pulmonary vascular edema improved    Acute kidney injury-resolved   Sirs positive sepsis better    CVA,    Ischemic hepatitis better    Encephalopathy, metabolic improving   Left ica stenosis - d/w Dr Mirta Locke no intervention   PLAN:     WEAN PER PROTOCOL:  [] No   [x] Yes  [] N/A    DISCONTINUE ANY LABS:   [x] No   [] Yes    ICU PROPHYLAXIS:  Stress ulcer:  [] PPI Agent  [x] [id number] [] Sucralfate  [] Other:  VTE:   [] Enoxaparin  [x] Unfract.  Heparin Subcut  [] EPC Cuffs    NUTRITION:  [] NPO [x] start Tube Feeding  [] TPN  [] PO        INSULIN DRIP:   [x] No   [] Yes    CONSULTATION NEEDED:  [x] No   [] Yes    FAMILY UPDATED:    [] No   [x] Yes son     TRANSFER OUT OF ICU:   [x] No   [] Yes    ADDITIONAL PLAN:  Start sbt   His bp is elevated even with oral meds so I will rec cont oral meds and keep prn meds for control sbp between 160 - 180   Continue to monitor off sedation - if needed for agitation minimal propofol gtt   Antibiotics per id   Monitor I/o         Electronically signed by Kit Gentile MD on 4/16/2019 at 4:10 PM   Total critical care time caring for this patient with life threatening, unstable organ failure, including direct patient contact, management of life support systems, review of data including imaging and labs, discussions with other team members and physicians at least 27  Min so far today, excluding procedures.

## 2019-04-16 NOTE — PROGRESS NOTES
[facility]  Occupational Therapy Not Seen Note    DATE: 2019  Name: Todd Servin  : 1965  MRN: [id number]    Patient not available for Occupational Therapy due to:    [] Testing:    [] Hemodialysis    [] Blood Transfusion in Progress    []Refusal by Patient:    [] Surgery/Procedure:    [] Strict Bedrest    [] Sedation    [] Spine Precautions     [] Pt being transferred to palliative care at this time. Spoke with pt/family and OT services to be defered. [] Pt independent with functional mobility and functional tasks. Pt with no OT acute care needs at this time, will defer OT eval.    [x] Other: Patient is intubated and is moving extremities.      Next Scheduled Treatment:   or Plan to see patient will following commands and appropriate to participate in therapy   Signature: Luz Milan OTR/L

## 2019-04-16 NOTE — PROGRESS NOTES
Paged neuro to see if they are okay with pt receiving light sedation. Per neuro SBP to be 160-180. Pt hr is 115, /76 and Resp 25. Pt received 10 mg Labetalol and 20 mg hydralazine. Awaiting call back.

## 2019-04-16 NOTE — PROGRESS NOTES
[facility]    Progress Note    4/16/2019    12:01 PM    Name:   Tawana Rondon  MRN:     [id number]     Acct:      [de-identified]   Room:   [address] [place] [address] Day:  9  Admit Date:  4/7/2019  4:48 AM    PCP:   No primary care provider on file. Code Status:  Full Code    Subjective:     C/C:   Chief Complaint   Patient presents with    Cardiac Arrest     diff breather, brought in on cpap with elevations in EKG on EMS arrival.      Interval History Status: not changed. Son at bedside continued  Off sedation now  Able to move both lower extremities spontaneously, not moving right hand much  Also spontaneous eye opening and thrashing head in bed  Had one episode of fever last night, also continued secretions     Brief History:     The patient is a 46 y.o. male who is admitted for  cardiac arrest and STEMI. Patient was admitted on 4/7/19 after he was brought in by EMS for dyspnea, acute respiratory failure. Patient was placed on CPAP and nebulization treatment. Initial EKG showed possible anterior septal STEMI. On arrival to [facility] emergency room patient had PEA arrest and was resuscitated per ACS protocol with 3 rounds of CPR and epinephrine with successful ROSC. Patient underwent emergent cardiac cath and showed multivessel CAD with severe LV dysfunction with EF 15% and severe hypokinesis. Patient had emergent CABG x 4 on 4/7/19 with closure of chest wall on 4/8/19. Ondinagarry Fierro Patient developed acute kidney injury post-CAB with creatinine peaking at 1.99 which improved after fluid resuscitation and pressor support. Patient was taken off pressor support. Patient was asked. On 4/10/19. Patient had altered mental status upon weaning from  sedatives . CT head showed scattered areas of encephalomalacia in bifrontal and left parietal areas consistent with remote infarct and possible subacute infarct in left lower parietal region.   Patient has underlying history of hypertension with non compliance to treatment.      Patient unable  To provide history as he is intubated . Has not been following up with providers for care . Patient also has wound in R knee and discharge . H/o Orthopedic surgery . Patient tolerating tube feeds     Review of Systems:     Un able to obtain from patient given altered mental status    Medications:      Allergies:  Not on File    Current Meds:   Scheduled Meds:    ceftaroline fosamil (TEFLARO) IVPB  600 mg Intravenous Q12H    insulin glargine  20 Units Subcutaneous Daily    insulin lispro  0-12 Units Subcutaneous Q6H    famotidine  20 mg Oral BID    enoxaparin  40 mg Subcutaneous Daily    nystatin  5 mL Oral 4x Daily    aspirin  81 mg Oral Daily    docusate  100 mg Oral Daily    dextrose  12.5 g Intravenous Once    albuterol  2.5 mg Nebulization Q6H    sodium chloride flush  10 mL Intravenous 2 times per day    sodium chloride flush  10 mL Intravenous 2 times per day    polyethylene glycol  17 g Oral Daily    chlorhexidine  15 mL Mouth/Throat BID    therapeutic multivitamin-minerals  1 tablet Oral Daily with breakfast    atorvastatin  40 mg Oral Nightly    clopidogrel  75 mg Oral Daily     Continuous Infusions:    niCARdipine      propofol Stopped (04/15/19 1045)    sodium chloride 20 mL/hr (04/14/19 1141)    dextrose       PRN Meds: sodium chloride flush, labetalol, naloxone, albuterol, sodium chloride flush, magnesium hydroxide, sodium chloride flush, calcium chloride IVPB, magnesium sulfate, potassium chloride, acetaminophen, acetaminophen, oxyCODONE-acetaminophen **OR** oxyCODONE-acetaminophen, diphenhydrAMINE, bisacodyl, ondansetron, hydrALAZINE, albumin human, glucose, dextrose, glucagon (rDNA), dextrose    Data:     Past Medical History:   has a past medical history of CAD (coronary artery disease), Cardiac arrest ([address] Osteolysis, PEA (Pulseless electrical activity) ([address] and PVD (peripheral vascular disease) ([facility] Social History:        Family History: History reviewed. No pertinent family history. Vitals:  BP (!) 157/62   Pulse 109   Temp 99 °F (37.2 °C)   Resp 19   Ht 5' 8\" (1.727 m)   Wt 180 lb 1.9 oz (81.7 kg)   SpO2 97%   BMI 27.39 kg/m²   Temp (24hrs), Av.3 °F (37.4 °C), Min:97.9 °F (36.6 °C), Max:102.4 °F (39.1 °C)    Recent Labs     04/15/19  1945 19  0455 19  1056 19  1138   POCGLU 188* 284* 288* 287*     I/O (24Hr):     Intake/Output Summary (Last 24 hours) at 2019 1201  Last data filed at 2019 0900  Gross per 24 hour   Intake 1490 ml   Output 2255 ml   Net -765 ml     Labs:    Lab Results   Component Value Date/Time    SPECIAL NOT REPORTED 2019 07:55 AM     Lab Results   Component Value Date/Time    CULTURE NO GROWTH 2019 07:55 AM     Lab Results   Component Value Date    POCPH 7.502 2019    POCPCO2 31.0 2019    POCPO2 73.4 2019    POCHCO3 24.3 2019    NBEA NOT REPORTED 2019    PBEA 2 2019    GLM7NGT 25 2019    MRBF4MOQ 96 2019    FIO2 40.0 2019     Physical Examination:        General appearance: awake, thrashing head in bed, not following any commands but   Mental Status: as above  Lungs:  clear to auscultation bilaterally, normal effort  Heart:  regular rate and rhythm, no murmur  Abdomen:  soft, nontender, nondistended, normal bowel sounds  Extremities:  no edema, redness, tenderness in the calves  Skin:  no gross lesions, rashes, induration    Assessment:        Primary Problem  Cardiac arrest [facility])    Active Hospital Problems    Diagnosis Date Noted    Acute cerebral infarction associated with systemic hypoxia or ischemia [I63.89]     Multi infarct state [I69.30]     Fever chills [R50.9]     Hepatitis [X62.7]     Metabolic encephalopathy [X08.76]     Abnormal CT of the head [R93.0]     Cardiac arrest (HCC) [I46.9] 2019    Severe left ventricular systolic dysfunction [M91.4] 04/07/2019    Acute respiratory failure with hypoxemia ([facility]) [J96.01] 04/07/2019    Cardiogenic pulmonary edema ([facility] [I50.1] 04/07/2019    PAD (peripheral artery disease) ([facility]) [I73.9] 04/07/2019    Acute ST elevation myocardial infarction (STEMI) of anterior wall ([facility]) [I21.09]     Subacute osteomyelitis of right tibia [facility]) [D93.440]     Exposed orthopaedic hardware ([facility] [P14.505Q]      Plan:        - Cardiac arrest: PEA needed 3 rounds of chest compressions  - Multivessel CAD, status post CABG now  - Encephalopathy, significant, EEG with no definite seizures. MRI with multiple small strokes in cerebral and cerebellar areas. Clinically as of today un able to move right arm but moving rest of extremities on his own. - CVA, multiple, small, bilateral cerebral area and left cerebellum area. NO regional large infarcts, continued close follow up of clinical status, sedation holiday today  - Hypertension, well controlled now  - Acute respiratory failure  - Diabetes, poorly controlled. lantus continued along with sliding scale now  - Fevers with underlying chronic osteomyelitis, cefatorline per ID team. Recurrent fevers on antibiotic.  Await ID evaluation    Discussed with son at bedside      [address], MD  4/16/2019  12:01 PM

## 2019-04-16 NOTE — PROGRESS NOTES
[facility]  Daily Progress Note  Surgeon:  Dr. Jorge Mercedes     POD# 8  S/P :  Emerg Coronary artery bypass 4/7/19  Chest closure 4/8/19  EF: 45 %    Subjective:  Mr. Diana Max is intubated.      Physical Exam  Vital Signs: BP (!) 147/58   Pulse 105   Temp 98.8 °F (37.1 °C) (Oral)   Resp 17   Ht 5' 8\" (1.727 m)   Wt 180 lb 1.9 oz (81.7 kg)   SpO2 96%   BMI 27.39 kg/m²      Admit Weight: Weight: 176 lb 5.9 oz (80 kg)   WEIGHTWeight: 180 lb 1.9 oz (81.7 kg)       Scheduled Meds:    ceftaroline fosamil (TEFLARO) IVPB  600 mg Intravenous Q12H    metoprolol tartrate  50 mg Oral BID    insulin glargine  20 Units Subcutaneous Daily    insulin lispro  0-12 Units Subcutaneous Q6H    famotidine  20 mg Oral BID    enoxaparin  40 mg Subcutaneous Daily    nystatin  5 mL Oral 4x Daily    aspirin  81 mg Oral Daily    docusate  100 mg Oral Daily    dextrose  12.5 g Intravenous Once    albuterol  2.5 mg Nebulization Q6H    sodium chloride flush  10 mL Intravenous 2 times per day    sodium chloride flush  10 mL Intravenous 2 times per day    polyethylene glycol  17 g Oral Daily    chlorhexidine  15 mL Mouth/Throat BID    therapeutic multivitamin-minerals  1 tablet Oral Daily with breakfast    atorvastatin  40 mg Oral Nightly    clopidogrel  75 mg Oral Daily     Continuous Infusions:    niCARdipine      propofol Stopped (04/15/19 1045)    sodium chloride 20 mL/hr (04/14/19 1141)    dextrose         Data:  CBC:   Recent Labs     04/14/19  0517 04/15/19  0516 04/16/19  0504   WBC 17.4* 17.5* 25.1*   HGB 9.6* 9.1* 10.0*   HCT 30.8* 28.8* 31.7*   MCV 96.0 95.0 94.6    252 331     BMP:   Recent Labs     04/14/19  0517 04/15/19  0516 04/16/19  0504    144 149*   K 4.5 4.2 4.3   * 114* 115*   CO2 22 23 19*   BUN 30* 27* 26*   CREATININE 0.67* 0.52* 0.48*     PT/INR:   Recent Labs     04/14/19  0517 04/15/19  0516 04/16/19  0504   PROTIME 12.9* 11.7 12.4*   INR 1.2 1.1 1.2 APTT: No results for input(s): APTT in the last 72 hours. Chest X-Ray:  4/10/19: There is redemonstration of bilateral pulmonary congestion/edema that is   similar compared to prior. Jose Rafael Skeens are bibasilar effusions. Jose Rafael Skeens is no   pneumothorax.  The heart is prominent.  The upper abdomen is unremarkable. The extrathoracic soft tissues are unremarkable. Jose Rafael Skeens is an endotracheal   tube with the tip in the midtrachea.  There is a gastric tube with the tip in   the proximal aspect of the stomach.  The right internal jugular line has been   removed.  The [place] catheter has been removed.           Impression   Cardiomegaly and bilateral pulmonary congestion/edema that is similar   compared to prior.       Bibasilar effusions and stable endotracheal tube and gastric tube.             I/O:  I/O last 3 completed shifts: In: 1914 [I.V.:601; NG/GT:592]  Out: 2245 [Urine:2245]      Assessment & Plan:   Patient Active Problem List   Diagnosis    Cardiac arrest ([address]    Acute ST elevation myocardial infarction (STEMI) of anterior wall (HCC)    Severe left ventricular systolic dysfunction    Acute respiratory failure with hypoxemia (HCC)    Cardiogenic pulmonary edema (HCC)    PAD (peripheral artery disease) (HCC)    Subacute osteomyelitis of right tibia ([address]    Exposed orthopaedic hardware ([address]    Metabolic encephalopathy    Abnormal CT of the head    Fever chills    Hepatitis    Acute cerebral infarction associated with systemic hypoxia or ischemia    Multi infarct state     1. S/p Emergent CABG   POD 9  2. HD - stable no pressors, neuro wants pressures 160s  3. Pulmo - sedated, intubated, keep intubated   4. GI - HIDA scan negative tolerating tube feeds at goal. BS +  5.  - good UOP  6. Neuro - opens eyes ? Tracking to right moves extrenties non purposeful   7. ABLA - stable  8. Ortho following for right knee infection  9. Discuss code with family  8.  Diminished peripheral pulses vascular following  Hypernatremia. Free water flushes  Hyperglycemia needs tighter control mgmnt per medicine team   Abx per ID  ?  Possible Trach and PEG soon   Estrellitanox      Gia Shell

## 2019-04-16 NOTE — CONSULTS
CARE PLANNING:  Patient has capacity for medical decisions: no  Health Care Power of : no  Living Will: no     Personal, Social, and Family History  Marital Status: single  Living situation: [place]  Hanh/Spiritual History:   Very spiritual, doesn't belong to particular [employer]  Psychological Distress: Patient is currently intubated  Does patient understand diagnosis/treatment? no  Does family/caregiver understand diagnosis/treatment? yes    Assessment        Palliative Performance Scale:    ___100% Full ambulation; normal activity and work; no evidence of disease; able to do own self care; normal intake; fully conscious  ___90% Full ambulation; normal activity and work; some evidence of disease; able to do own self care; normal intake; fully conscious  ___80% Full ambulation; normal activity with effort; some evidence of disease; able to do own self care; normal or reduced intake; fully conscious  ___70% Ambulation reduced; unable to perform normal job/work; significant disease; able to do own self care; normal or reduced intake; fully conscious  ___60%  Ambulation reduced; cannot do hobbies/housework; significant disease; occasional assist; intake normal or reduced; fully conscious/some confusion  ___50%  Mainly sit/lie; can't do any work; extensive disease; considerable assist; intake normal or reduced; fully conscious/some confusion  ___40%  Mainly in bed; extensive disease; mainly assist; intake normal or reduced; fully conscious/ some confusion   ___30%  Bed bound; extensive disease; total care; intake reduced; fully conscious/some confusion  _X_20%  Bed bound; extensive disease; total care; intake minimal; drowsy/coma  ___10%  Bed bound; extensive disease; total care; mouth care only; drowsy/coma  ___0       Death       Risk Assessments:    Readmission Risk Score: 19      Plan        Palliative Interaction:  Palliative Care was consulted by nursing for goals of care.   Patient is currently intubated and not able to participate in conversation. Update received by patient's bedside RN, Brittany Esquivel. Writer met today with patient's son Lito Noland and patient's father Jimmy Patton., and step-mother, Maida Bender, as well as patient's brother Cass Read. 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 Lito Noland and Maida Yulia both state that the patient \"doesn't take care of himself\" and \"hasn't been to a doctor\" in many years. Apparently the patient broke his leg back in 2005 and had a jessica/screw placed that was supposed to come out some time in 2006. The patient allegedly never followed up with a physician and the family states they were told during this admission that the jessica/screw was protruding out of the skin and was removed by the physicians here. Lito Noland states that his dad has, \"problems with his kidneys, had a heart attack, had a stroke, problems with his carotid, osteomyelitis,\" as he counts off each problem on his fingers. Lito Noland states that the physicians are now planning on performing a tracheostomy for the patient. He states that they were hoping that it \"would't come to that\" but as the patient has been unable to wean from the ventilator it appears that may be the plan. All of the family present are all in agreement that the patient would want a tracheostomy, both Cass Read and Maida Bender stating, \"we have to, if that's our only option. \"  Both Liot Noland and Maida Bender initially stated that they believed that after the tracheostomy the patient would no longer need the ventilator, however, Writer explained that the patient may need the ventilator for an undetermined amount of time and the family voiced understanding. The family states that they are hopeful that more time will increase the patient's neurological function as they state that he seems more and more awake every day. Emotional support provided.          Education/support to family  Providing support for coping/adaptation/distress of family    Principle Problem/Diagnosis:  Cardiac arrest ([facility] [I46.9]    Goals of care evaluation:  The patient goals of care are live longer, improve or maintain function/quality of life, preserve independence/autonomy/control and support for family/caregiver   Goals of care discussed with:    [] Patient independently    [] Patient and Family    [x] Family or Healthcare DPOA independently    [] Unable to discuss with patient, family/DPOA not present    Code Status  Full Code    Other recommendations:  Please call with any palliative questions or concerns. Palliative Care Team is available via perfect serve or via phone - [phone]. Palliative Care will continue to follow Mr. Phuong Almaraz care as needed. Thank you for allowing Palliative Care to participate in the care of Mr. Jorge Barboza .     Electronically signed by   Cornelio De Luna RN  Palliative Care Team  on 4/16/2019 at 2:04 PM    Palliative care office: [phone]

## 2019-04-16 NOTE — CARE COORDINATION
Dr. Kimberly Bryant discussed with son possibility of trach and peg. Son to discuss with family, await family decision    [phone] received call from Ruby with [employer] requesting proof of citizenship for [facility] application. She states she has left a voicemail with patient's sn Tarri Cuff requesting this information    [phone] faxed copy of patient's Social Security card to Ruby with [employer]. Copy placed in patient's chart.   Notified Ruby over the phone, states patient's son will look for birth certificate

## 2019-04-16 NOTE — PROGRESS NOTES
[facility]   Progress Note                   Date:   4/16/2019  Patient name: Todd Servin  Date of admission:  4/7/2019  4:48 AM  MRN:   [id number]  YOB: 1965  PCP: No primary care provider on file. Reason for Admission: STEMI    Subjective:       S/P emergent CABG, POD#8    PEA Arrest, late presentation of STEMI with emergent bypass. Post op course complicated by neurological insult and poor neurological recovery with multiple areas of ischemic stroke      @ pt is febrile with tmax of 101  Patient has labile BP with range of 200  Received labetalol and hydralazine PRN for BP control  Art line is positional and error in reading intermittently    I/O last 3 completed shifts:   In: 1490 [I.V.:517; NG/GT:973]  Out: 2105 [Urine:2105]  I/O this shift:  In: -   Out: 500 [Urine:500]      Medications:   Scheduled Meds:   ceftaroline fosamil (TEFLARO) IVPB  600 mg Intravenous Q12H    insulin glargine  20 Units Subcutaneous Daily    insulin lispro  0-12 Units Subcutaneous Q6H    famotidine  20 mg Oral BID    enoxaparin  40 mg Subcutaneous Daily    nystatin  5 mL Oral 4x Daily    aspirin  81 mg Oral Daily    docusate  100 mg Oral Daily    dextrose  12.5 g Intravenous Once    albuterol  2.5 mg Nebulization Q6H    sodium chloride flush  10 mL Intravenous 2 times per day    sodium chloride flush  10 mL Intravenous 2 times per day    polyethylene glycol  17 g Oral Daily    chlorhexidine  15 mL Mouth/Throat BID    therapeutic multivitamin-minerals  1 tablet Oral Daily with breakfast    atorvastatin  40 mg Oral Nightly    clopidogrel  75 mg Oral Daily       Continuous Infusions:   niCARdipine      propofol Stopped (04/15/19 1045)    sodium chloride 20 mL/hr (04/14/19 1141)    dextrose         CBC:   Recent Labs     04/14/19  0517 04/15/19  0516 04/16/19  0504   WBC 17.4* 17.5* 25.1*   HGB 9.6* 9.1* 10.0*    252 331     BMP:    Recent Labs     04/14/19  0517 04/15/19  0516 04/16/19  0504    144 149*   K 4.5 4.2 4.3   * 114* 115*   CO2 22 23 19*   BUN 30* 27* 26*   CREATININE 0.67* 0.52* 0.48*   GLUCOSE 287* 255* 297*     Hepatic:   Recent Labs     04/14/19  0517   *   ALT 1,843*   BILITOT 1.48*   ALKPHOS 151*     Troponin: No results for input(s): TROPONINI in the last 72 hours. BNP: No results for input(s): BNP in the last 72 hours. Lipids: No results for input(s): CHOL, HDL in the last 72 hours. Invalid input(s): LDLCALCU  INR:   Recent Labs     04/14/19  0517 04/15/19  0516 04/16/19  0504   INR 1.2 1.1 1.2       Objective:   Vitals: BP (!) 180/60   Pulse 112   Temp 97.9 °F (36.6 °C)   Resp 18   Ht 5' 8\" (1.727 m)   Wt 180 lb 1.9 oz (81.7 kg)   SpO2 95%   BMI 27.39 kg/m²   General appearance: intubated, but not sedated. Does not follow commands  HEENT: Head: Normocephalic, no lesions,   Bilateral bruist   Neck: no JVD  Lungs:  Mechanical ventilator  Heart: regular rate and rhythm, S1, S2 normal, no murmur, click, rub or gallop  Abdomen: soft, non-tender; bowel sounds normal  Extremities: No LE edema  Neurologic: Grimaces to pain. Does not follow commands      EKG: Sinus tachycardia with anteroseptal infarct      Coronary Angiography: 4/7/19  LMCA: has distal 60% stenosis. LAD: has ostial 90% stenosis and long proximal 80% stenosis. D1 has proximal 70% stenosis. LCx: has 20% stenosis. OM1 is very small. OM2 has proximal 90% stenosis. RCA: has proximal 50% stenosis, mid 60% stenosis and distal 80% stenosis. Ramus: has ostial 99% stenosis.     The LV gram was performed in the DUTTON 30 position. LVEF: 15%. LV Wall Motion: severe hypokinesis of the anterior wall and mid inferior wall with apical akinesis. Echo 4/12/19:  Left ventricle is normal in size. Global left ventricular systolic function is mildly reduced. Calculated ejection fraction is 40. Left ventricular wall thickness is mildly increased.  Grade II diastolic patient, including pertinent history and exam findings, with the resident. I have seen and examined the patient and the key elements of all parts of the encounter have been performed by me. I agree with the assessment, plan and orders as documented by the resident.   As BP fluctuation , as per neuro recommendation will add small dose of BB to hold <   Tato MD Willie

## 2019-04-16 NOTE — PLAN OF CARE
Problem: OXYGENATION/RESPIRATORY FUNCTION  Goal: Patient will maintain patent airway  4/16/2019 0821 by Cherylene Reas, RCP  Outcome: Ongoing  4/16/2019 0536 by Carin aL RN  Outcome: Ongoing  4/15/2019 1841 by Stacy Dickens RN  Outcome: Ongoing  Goal: Patient will achieve/maintain normal respiratory rate/effort  Description  Respiratory rate and effort will be within normal limits for the patient  4/16/2019 0821 by Cherylene Reas, RCP  Outcome: Ongoing  4/16/2019 0536 by Carin La RN  Outcome: Ongoing  Note:   Patient remains intubated at this time. At times patient tachypneic.    4/15/2019 1841 by Stacy Dickens RN  Outcome: Ongoing     Problem: MECHANICAL VENTILATION  Goal: Patient will maintain patent airway  4/16/2019 0821 by Cherylene Reas, RCP  Outcome: Ongoing  4/15/2019 1841 by Stacy Dickens RN  Outcome: Ongoing  Goal: Oral health is maintained or improved  4/16/2019 0821 by Cherylene Reas, RCP  Outcome: Ongoing  4/15/2019 1841 by Stacy Dickens RN  Outcome: Ongoing  Goal: ET tube will be managed safely  4/16/2019 0821 by Cherylene Reas, RCP  Outcome: Ongoing  4/15/2019 1841 by Stacy Dickens RN  Outcome: Ongoing  Goal: Ability to express needs and understand communication  4/16/2019 0821 by Cherylene Reas, RCP  Outcome: Ongoing  4/15/2019 1841 by Stacy Dickens RN  Outcome: Ongoing  Goal: Mobility/activity is maintained at optimum level for patient  4/16/2019 0821 by Cherylene Reas, RCP  Outcome: Ongoing  4/15/2019 1841 by Stacy Dickens RN  Outcome: Ongoing

## 2019-04-16 NOTE — PROGRESS NOTES
questionable and to interpret results carefully. Blood cultures 4/11 x 2 with no growth to date. Urine culture 4/12 with no growth to date. Sputum culture 4/12 with mixed bacterial morphotypes. Hypernatremic this am, Na 149. WBCs trended up 17.5 -> 25.1. Hgb stable at 9.1. ALMITA has resolved with creatinine of 0.48 today. LFTs ordered showed Albumin 2.7. Bilirubin 1.48, .85 direct. Total protein 5.5. Amiodarone discontinued per cardiology. [place] scan performed today showed no evidence of cholecystitis. Echocardiogram showed mildly reduced ejection fraction. No clot visualized. CXR 4-15-19: Bilateral effusions, parahilar and lower zone airspace opacities. Retrocardiac consolidation. Changes are those of CHF. Currently on Ceftaroline. Multiple lines. No inflammation    Cultures:  Wound:  · Orthopedic screw: Leg screw cultures found. Direct exam showed many gram + cocci in clusters and few gram - rods, NGTD. Lab reports quality of sample is questioned and to interpret results carefully. Blood:  · Blood cultures 4/11 x 2 with no growth to date. Urine:  · Urine culture 4/12 with no growth to date. Sputum  · Sputum culture 4/12 with mixed bacterial morphotypes. Discussed with RN. I have personally reviewed the past medical history, past surgical history, medications, social history, and family history, and I have updated the database accordingly.   Past Medical History:     Past Medical History:   Diagnosis Date    CAD (coronary artery disease) 04/07/2019    Cardiac arrest ([facility] 04/07/2019    Osteolysis 04/07/2019    R Knee hardware    PEA (Pulseless electrical activity) ([address] 04/07/2019    PVD (peripheral vascular disease) ([facility] [address] 04/07/2019       Past Surgical  History:     Past Surgical History:   Procedure Laterality Date    CARDIAC CATHETERIZATION  04/07/2019    MVD-Stv    CORONARY ARTERY BYPASS GRAFT  04/07/2019    x4 per Dr. Denilson Gaytan, [address] ARTERY BYPASS GRAFT N/A 4/7/2019    EMERGENT CPR,  CORONARY ARTERY BYPASS X4, ON PUMP ,CHEST LEFT OPEN; SWAN, ANGELA PER ANESTHESIA performed by Sherrilee Blizzard, MD at [address] [place] [address],[address]      unsure if correct known tibial surgery    HARDWARE REMOVAL Right 04/07/2019    R Knee internal screw protruding through skin    RECONSTRUCTIVE REPAIR STERNAL N/A 4/8/2019    STERNAL  WOUND WASHOUT AND CLOSURE S/P CABG performed by Sherrilee Blizzard, MD at [facility] Right     [facility] off roof, external fixation and internal hardware    TONSILLECTOMY         Medications:      ceftaroline fosamil (TEFLARO) IVPB  600 mg Intravenous Q12H    insulin glargine  20 Units Subcutaneous Daily    insulin lispro  0-12 Units Subcutaneous Q6H    famotidine  20 mg Oral BID    enoxaparin  40 mg Subcutaneous Daily    nystatin  5 mL Oral 4x Daily    aspirin  81 mg Oral Daily    docusate  100 mg Oral Daily    dextrose  12.5 g Intravenous Once    albuterol  2.5 mg Nebulization Q6H    sodium chloride flush  10 mL Intravenous 2 times per day    sodium chloride flush  10 mL Intravenous 2 times per day    polyethylene glycol  17 g Oral Daily    chlorhexidine  15 mL Mouth/Throat BID    therapeutic multivitamin-minerals  1 tablet Oral Daily with breakfast    atorvastatin  40 mg Oral Nightly    clopidogrel  75 mg Oral Daily       Social History:     Social History     Socioeconomic History    Marital status: Unknown     Spouse name: Not on file    Number of children: Not on file    Years of education: Not on file    Highest education level: Not on file   Occupational History    Not on file   Social Needs    Financial resource strain: Not on file    Food insecurity:     Worry: Not on file     Inability: Not on file    Transportation needs:     Medical: Not on file     Non-medical: Not on file   Tobacco Use    Smoking status: Not on file   Substance and Sexual Activity    Alcohol use: Not on file    Drug use: Not on file    Sexual activity: Not on file   Lifestyle    Physical activity:     Days per week: Not on file     Minutes per session: Not on file    Stress: Not on file   Relationships    Social connections:     Talks on phone: Not on file     Gets together: Not on file     Attends Evangelical service: Not on file     Active member of club or organization: Not on file     Attends meetings of clubs or organizations: Not on file     Relationship status: Not on file    Intimate partner violence:     Fear of current or ex partner: Not on file     Emotionally abused: Not on file     Physically abused: Not on file     Forced sexual activity: Not on file   Other Topics Concern    Not on file   Social History Narrative    Not on file       Family History:   History reviewed. No pertinent family history. Allergies:   Patient has no allergy information on record. Review of Systems:   Unable to provide. Sedated on ventilator. Physical Examination :     Patient Vitals for the past 8 hrs:   BP Temp Temp src Pulse Resp SpO2   04/16/19 0700 (!) 175/60 -- -- 105 18 98 %   04/16/19 0600 (!) 147/58 -- -- 105 17 96 %   04/16/19 0540 (!) 177/63 -- -- 114 19 --   04/16/19 0505 (!) 181/72 -- -- 107 23 --   04/16/19 0400 (!) 204/62 -- -- 114 21 96 %   04/16/19 0327 -- -- -- 122 21 94 %   04/16/19 0300 (!) 187/78 98.8 °F (37.1 °C) Oral 120 21 95 %   04/16/19 0200 (!) 176/70 -- -- 104 17 95 %   04/16/19 0140 (!) 162/72 -- -- 103 15 --   04/16/19 0130 -- 102.4 °F (39.1 °C) Oral -- -- --   04/16/19 0100 (!) 149/61 -- -- 96 17 97 %   04/16/19 0000 (!) 157/54 -- -- 93 17 95 %     General Appearance: Sedated on vent  Head:  Normocephalic, no trauma  Eyes: Pupils equal, round, reactive to light; sclera anicteric; conjunctivae pink. No embolic phenomena. ENT: Oropharynx clear, without erythema, exudate, or thrush. No tenderness of sinuses. Mouth/throat: mucosa pink and moist. No lesions.  Dentition in good screw.  No periprosthetic   fracture.  Chronic deformity of the proximal fibula.  Degenerative changes of   knee and tib-fib syndesmosis.         Impression   Removal of anterior-superior fixating screw without interval change otherwise     EXAMINATION:   3 XRAY VIEWS OF THE RIGHT KNEE       4/7/2019 3:28 pm       COMPARISON:   None.       HISTORY:   ORDERING SYSTEM PROVIDED HISTORY: Trauma/Fracture   TECHNOLOGIST PROVIDED HISTORY:   Trauma/Fracture       FINDINGS:   Sideplate and 4 screws transfix the proximal tibia laterally.  Old healed   fracture proximal fibular neck and head.  Spurring lateral tibia plateau. Proximal tibiofibular syndesmosis.  Visualized femur and patella normal.   Soft tissues unremarkable.           Impression   ORIF proximal tibia.  No acute fracture.         EXAMINATION:   SINGLE XRAY VIEW OF THE CHEST       4/10/2019 6:47 am       COMPARISON:   Chest radiograph performed 04/09/2019.       HISTORY:   ORDERING SYSTEM PROVIDED HISTORY: post op   TECHNOLOGIST PROVIDED HISTORY:   post op       FINDINGS:   There is redemonstration of bilateral pulmonary congestion/edema that is   similar compared to prior.  There are bibasilar effusions. Prasad Coffer is no   pneumothorax.  The heart is prominent.  The upper abdomen is unremarkable. The extrathoracic soft tissues are unremarkable. Prasad Coffer is an endotracheal   tube with the tip in the midtrachea.  There is a gastric tube with the tip in   the proximal aspect of the stomach.  The right internal jugular line has been   removed.  The Mustang-Carly catheter has been removed.           Impression   Cardiomegaly and bilateral pulmonary congestion/edema that is similar   compared to prior.       Bibasilar effusions and stable endotracheal tube and gastric tube.             Medical Decision Making-Other: Thank you for allowing us to participate in the care of this patient. Please call with questions.     Garfield Pressley     ATTESTATION:    I have discussed the case, including pertinent history and exam findings with the residents. I have seen and examined the patient and the key elements of the encounter have been performed by me. I have reviewed the laboratory data, other diagnostic studies and discussed them with the residents. I have updated the medical record where necessary. I agree with the assessment, plan and orders as documented by the resident.     Jong Vann MD.

## 2019-04-16 NOTE — PROGRESS NOTES
NEUROLOGY INPATIENT PROGRESS NOTE    4/16/2019         Subjective: Michelle Gallego is a  46 y.o. male admitted on 4/7/2019. Chart reviewed. Discussed with RN. Patient examined. Patient currently off sedation. Nurse reports he has had his eyes open, has been looking around but not responding to commands or questions. Some spontaneous movement noted earlier to left arm and possibly right hand, none to legs. Will likely need to start sedation again due to increased heart rate and respiratory rate. HPI: Briefly, this is a  46 y.o. male who was admitted on 4/7/2019 with cardiac arrest. Per the records the patient called EMS for worsening shortness of breath; on EMS arrival patient was found to have audible rales, EKG concerning for STEMI. When EMS was arriving to ER the patient lost pulses, compressions were started with an initial rhythm of PEA. Patient received continued CPR and epi x2, ROSC after 3 rounds of CPR. Patient was taken emergently to cath lab where they found multivessel disease and he arrested again, was able to be resuscitated. He was then transferred to the OR immediately for CABG, he arrested again on arrival to the OR and was placed on coronary bypass. The patient is s/p CABG x4 on 4/7. He had chest closure on 4/8. Per the notes, the patient was able to follow commands, had spontaneous movement as he was trying to pull at his tubes on 4/10. Sedation was increased at that time due to patient irritability, and was later weaned off. RN reported both Precedex and Versed drips have been off since 4/11 at 8am and since that time the patient has not responded to commands, has not had spontaneous movement. Has required intermittent sedation since that time due to unstable vitals. A CT head was done showing scattered areas of encephalomalacia to bifrontal and high left parietal areas consistent with remote infarct/trauma, area of possible subacute infarct to left lower parietal region.  MRI brain with multi infarct state and L ICA high grade stenosis vs occlusion; endovascular was consulted. ECHO with EF 40%. Kidney function has worsened with today's BUN/creatinine 43/1. 99. Liver enzymes have worsened with AST/ALT 3077/3818 respectively. Labs have since improved. No current facility-administered medications on file prior to encounter. No current outpatient medications on file prior to encounter. Allergies: Chayo Moncada has no allergies on file. Past Medical History:   Diagnosis Date    CAD (coronary artery disease) 04/07/2019    Cardiac arrest ([facility] [address] 04/07/2019    Osteolysis 04/07/2019    R Knee hardware    PEA (Pulseless electrical activity) ([facility] [address] 04/07/2019    PVD (peripheral vascular disease) ([facility] [address] 04/07/2019       Past Surgical History:   Procedure Laterality Date    CARDIAC CATHETERIZATION  04/07/2019    MVD-Stv    CORONARY ARTERY BYPASS GRAFT  04/07/2019    x4 per Dr. Jose Luis Cedillo, [facility] GRAFT N/A 4/7/2019    EMERGENT CPR,  CORONARY ARTERY BYPASS X4, ON PUMP ,CHEST LEFT OPEN; SWAN, ANGELA PER ANESTHESIA performed by Chava Albarran MD at [address] [place] [address],[address]      unsure if correct known tibial surgery    HARDWARE REMOVAL Right 04/07/2019    R Knee internal screw protruding through skin    RECONSTRUCTIVE REPAIR STERNAL N/A 4/8/2019    STERNAL  WOUND WASHOUT AND CLOSURE S/P CABG performed by Chava Albarran MD at [address] [place] [address] Right     [facility] off roof, external fixation and internal hardware    TONSILLECTOMY         Social History: Chayo Moncada      History reviewed. No pertinent family history.     Objective:   BP (!) 197/71   Pulse 116   Temp 99 °F (37.2 °C)   Resp 19   Ht 5' 8\" (1.727 m)   Wt 180 lb 1.9 oz (81.7 kg)   SpO2 93%   BMI 27.39 kg/m²     Blood pressure range: Systolic (81MSB), SZA:763 , Min:140 , LFA:630   ; Diastolic (89KOV), HET:40, Min:54, Max:78      Review of Systems:  Cannot complete due to

## 2019-04-17 ENCOUNTER — APPOINTMENT (OUTPATIENT)
Dept: GENERAL RADIOLOGY | Age: 54
DRG: 165 | End: 2019-04-17
Payer: MEDICAID

## 2019-04-17 LAB
-: NORMAL
ALLEN TEST: POSITIVE
ANION GAP SERPL CALCULATED.3IONS-SCNC: 11 MMOL/L (ref 9–17)
BUN BLDV-MCNC: 22 MG/DL (ref 6–20)
BUN/CREAT BLD: ABNORMAL (ref 9–20)
CALCIUM SERPL-MCNC: 8.8 MG/DL (ref 8.6–10.4)
CHLORIDE BLD-SCNC: 114 MMOL/L (ref 98–107)
CHOLESTEROL/HDL RATIO: 4.3
CHOLESTEROL: 56 MG/DL
CO2: 24 MMOL/L (ref 20–31)
CREAT SERPL-MCNC: 0.49 MG/DL (ref 0.7–1.2)
CULTURE: ABNORMAL
CULTURE: ABNORMAL
CULTURE: NORMAL
CULTURE: NORMAL
DIRECT EXAM: ABNORMAL
FIO2: 40
FOLATE: 17.6 NG/ML
GFR AFRICAN AMERICAN: >60 ML/MIN
GFR NON-AFRICAN AMERICAN: >60 ML/MIN
GFR SERPL CREATININE-BSD FRML MDRD: ABNORMAL ML/MIN/{1.73_M2}
GFR SERPL CREATININE-BSD FRML MDRD: ABNORMAL ML/MIN/{1.73_M2}
GLUCOSE BLD-MCNC: 195 MG/DL (ref 75–110)
GLUCOSE BLD-MCNC: 209 MG/DL (ref 75–110)
GLUCOSE BLD-MCNC: 221 MG/DL (ref 75–110)
GLUCOSE BLD-MCNC: 226 MG/DL (ref 75–110)
GLUCOSE BLD-MCNC: 244 MG/DL (ref 75–110)
GLUCOSE BLD-MCNC: 278 MG/DL (ref 70–99)
HCT VFR BLD CALC: 32.7 % (ref 40.7–50.3)
HDLC SERPL-MCNC: 13 MG/DL
HEMOGLOBIN: 9.8 G/DL (ref 13–17)
INR BLD: 1.2
LDL CHOLESTEROL: 22 MG/DL (ref 0–130)
Lab: ABNORMAL
Lab: NORMAL
Lab: NORMAL
MAGNESIUM: 2.1 MG/DL (ref 1.6–2.6)
MCH RBC QN AUTO: 30.3 PG (ref 25.2–33.5)
MCHC RBC AUTO-ENTMCNC: 30 G/DL (ref 28.4–34.8)
MCV RBC AUTO: 101.2 FL (ref 82.6–102.9)
MODE: ABNORMAL
NEGATIVE BASE EXCESS, ART: ABNORMAL (ref 0–2)
NRBC AUTOMATED: 0 PER 100 WBC
O2 DEVICE/FLOW/%: ABNORMAL
PATIENT TEMP: ABNORMAL
PDW BLD-RTO: 15 % (ref 11.8–14.4)
PLATELET # BLD: 351 K/UL (ref 138–453)
PMV BLD AUTO: 12 FL (ref 8.1–13.5)
POC HCO3: 27.6 MMOL/L (ref 21–28)
POC O2 SATURATION: 97 % (ref 94–98)
POC PCO2 TEMP: ABNORMAL MM HG
POC PCO2: 37.4 MM HG (ref 35–48)
POC PH TEMP: ABNORMAL
POC PH: 7.48 (ref 7.35–7.45)
POC PO2 TEMP: ABNORMAL MM HG
POC PO2: 83 MM HG (ref 83–108)
POSITIVE BASE EXCESS, ART: 4 (ref 0–3)
POTASSIUM SERPL-SCNC: 3.6 MMOL/L (ref 3.7–5.3)
PROTHROMBIN TIME: 12.5 SEC (ref 9–12)
RBC # BLD: 3.23 M/UL (ref 4.21–5.77)
REASON FOR REJECTION: NORMAL
SAMPLE SITE: ABNORMAL
SODIUM BLD-SCNC: 149 MMOL/L (ref 135–144)
SPECIMEN DESCRIPTION: ABNORMAL
SPECIMEN DESCRIPTION: NORMAL
SPECIMEN DESCRIPTION: NORMAL
TCO2 (CALC), ART: 29 MMOL/L (ref 22–29)
TRIGL SERPL-MCNC: 107 MG/DL
VITAMIN B-12: 1994 PG/ML (ref 232–1245)
VITAMIN D 25-HYDROXY: 11.6 NG/ML (ref 30–100)
VLDLC SERPL CALC-MCNC: ABNORMAL MG/DL (ref 1–30)
WBC # BLD: 30 K/UL (ref 3.5–11.3)
ZZ NTE CLEAN UP: ORDERED TEST: NORMAL
ZZ NTE WITH NAME CLEAN UP: SPECIMEN SOURCE: NORMAL

## 2019-04-17 PROCEDURE — 82803 BLOOD GASES ANY COMBINATION: CPT

## 2019-04-17 PROCEDURE — 6360000002 HC RX W HCPCS: Performed by: NURSE PRACTITIONER

## 2019-04-17 PROCEDURE — 82306 VITAMIN D 25 HYDROXY: CPT

## 2019-04-17 PROCEDURE — 80061 LIPID PANEL: CPT

## 2019-04-17 PROCEDURE — 94640 AIRWAY INHALATION TREATMENT: CPT

## 2019-04-17 PROCEDURE — 6360000002 HC RX W HCPCS: Performed by: THORACIC SURGERY (CARDIOTHORACIC VASCULAR SURGERY)

## 2019-04-17 PROCEDURE — 2700000000 HC OXYGEN THERAPY PER DAY

## 2019-04-17 PROCEDURE — 6360000002 HC RX W HCPCS: Performed by: FAMILY MEDICINE

## 2019-04-17 PROCEDURE — 80048 BASIC METABOLIC PNL TOTAL CA: CPT

## 2019-04-17 PROCEDURE — 36600 WITHDRAWAL OF ARTERIAL BLOOD: CPT

## 2019-04-17 PROCEDURE — 99222 1ST HOSP IP/OBS MODERATE 55: CPT | Performed by: PSYCHIATRY & NEUROLOGY

## 2019-04-17 PROCEDURE — 2580000003 HC RX 258: Performed by: NURSE PRACTITIONER

## 2019-04-17 PROCEDURE — 97606 NEG PRS WND THER DME>50 SQCM: CPT

## 2019-04-17 PROCEDURE — 83036 HEMOGLOBIN GLYCOSYLATED A1C: CPT

## 2019-04-17 PROCEDURE — 6360000002 HC RX W HCPCS: Performed by: INTERNAL MEDICINE

## 2019-04-17 PROCEDURE — 36415 COLL VENOUS BLD VENIPUNCTURE: CPT

## 2019-04-17 PROCEDURE — 85610 PROTHROMBIN TIME: CPT

## 2019-04-17 PROCEDURE — 99233 SBSQ HOSP IP/OBS HIGH 50: CPT | Performed by: INTERNAL MEDICINE

## 2019-04-17 PROCEDURE — 94770 HC ETCO2 MONITOR DAILY: CPT

## 2019-04-17 PROCEDURE — 2500000003 HC RX 250 WO HCPCS: Performed by: INTERNAL MEDICINE

## 2019-04-17 PROCEDURE — 94762 N-INVAS EAR/PLS OXIMTRY CONT: CPT

## 2019-04-17 PROCEDURE — 6370000000 HC RX 637 (ALT 250 FOR IP): Performed by: INTERNAL MEDICINE

## 2019-04-17 PROCEDURE — 6370000000 HC RX 637 (ALT 250 FOR IP): Performed by: FAMILY MEDICINE

## 2019-04-17 PROCEDURE — 99232 SBSQ HOSP IP/OBS MODERATE 35: CPT | Performed by: INTERNAL MEDICINE

## 2019-04-17 PROCEDURE — 97110 THERAPEUTIC EXERCISES: CPT

## 2019-04-17 PROCEDURE — 71045 X-RAY EXAM CHEST 1 VIEW: CPT

## 2019-04-17 PROCEDURE — 99291 CRITICAL CARE FIRST HOUR: CPT | Performed by: INTERNAL MEDICINE

## 2019-04-17 PROCEDURE — 6370000000 HC RX 637 (ALT 250 FOR IP): Performed by: PHYSICIAN ASSISTANT

## 2019-04-17 PROCEDURE — 6370000000 HC RX 637 (ALT 250 FOR IP): Performed by: NURSE PRACTITIONER

## 2019-04-17 PROCEDURE — 94003 VENT MGMT INPAT SUBQ DAY: CPT

## 2019-04-17 PROCEDURE — 2100000001 HC CVICU R&B

## 2019-04-17 PROCEDURE — 6360000002 HC RX W HCPCS

## 2019-04-17 PROCEDURE — 83735 ASSAY OF MAGNESIUM: CPT

## 2019-04-17 PROCEDURE — 85027 COMPLETE CBC AUTOMATED: CPT

## 2019-04-17 PROCEDURE — 82746 ASSAY OF FOLIC ACID SERUM: CPT

## 2019-04-17 PROCEDURE — 2580000003 HC RX 258: Performed by: INTERNAL MEDICINE

## 2019-04-17 PROCEDURE — 82947 ASSAY GLUCOSE BLOOD QUANT: CPT

## 2019-04-17 PROCEDURE — 99233 SBSQ HOSP IP/OBS HIGH 50: CPT | Performed by: NURSE PRACTITIONER

## 2019-04-17 PROCEDURE — 82607 VITAMIN B-12: CPT

## 2019-04-17 RX ORDER — DOXYCYCLINE HYCLATE 100 MG
100 TABLET ORAL EVERY 12 HOURS SCHEDULED
Status: DISCONTINUED | OUTPATIENT
Start: 2019-04-17 | End: 2019-04-25 | Stop reason: HOSPADM

## 2019-04-17 RX ORDER — FUROSEMIDE 10 MG/ML
20 INJECTION INTRAMUSCULAR; INTRAVENOUS 2 TIMES DAILY
Status: DISCONTINUED | OUTPATIENT
Start: 2019-04-17 | End: 2019-04-18

## 2019-04-17 RX ORDER — FUROSEMIDE 10 MG/ML
INJECTION INTRAMUSCULAR; INTRAVENOUS
Status: COMPLETED
Start: 2019-04-17 | End: 2019-04-17

## 2019-04-17 RX ORDER — FENTANYL CITRATE 50 UG/ML
25 INJECTION, SOLUTION INTRAMUSCULAR; INTRAVENOUS
Status: DISCONTINUED | OUTPATIENT
Start: 2019-04-17 | End: 2019-04-24 | Stop reason: ALTCHOICE

## 2019-04-17 RX ORDER — POTASSIUM CHLORIDE 1.5 G/1.77G
20 POWDER, FOR SOLUTION ORAL 2 TIMES DAILY
Status: DISCONTINUED | OUTPATIENT
Start: 2019-04-17 | End: 2019-04-22 | Stop reason: SDUPTHER

## 2019-04-17 RX ADMIN — Medication 10 ML: at 21:54

## 2019-04-17 RX ADMIN — ALBUTEROL SULFATE 2.5 MG: 2.5 SOLUTION RESPIRATORY (INHALATION) at 20:42

## 2019-04-17 RX ADMIN — ALBUTEROL SULFATE 2.5 MG: 2.5 SOLUTION RESPIRATORY (INHALATION) at 04:09

## 2019-04-17 RX ADMIN — Medication 10 ML: at 07:59

## 2019-04-17 RX ADMIN — FUROSEMIDE 20 MG: 10 INJECTION, SOLUTION INTRAMUSCULAR; INTRAVENOUS at 12:47

## 2019-04-17 RX ADMIN — INSULIN GLARGINE 20 UNITS: 100 INJECTION, SOLUTION SUBCUTANEOUS at 07:52

## 2019-04-17 RX ADMIN — METOPROLOL TARTRATE 12.5 MG: 25 TABLET ORAL at 07:55

## 2019-04-17 RX ADMIN — POTASSIUM CHLORIDE 10 MEQ: 29.8 INJECTION, SOLUTION INTRAVENOUS at 09:49

## 2019-04-17 RX ADMIN — INSULIN LISPRO 4 UNITS: 100 INJECTION, SOLUTION INTRAVENOUS; SUBCUTANEOUS at 15:56

## 2019-04-17 RX ADMIN — FUROSEMIDE 20 MG: 10 INJECTION, SOLUTION INTRAMUSCULAR; INTRAVENOUS at 17:05

## 2019-04-17 RX ADMIN — INSULIN LISPRO 4 UNITS: 100 INJECTION, SOLUTION INTRAVENOUS; SUBCUTANEOUS at 21:59

## 2019-04-17 RX ADMIN — MULTIPLE VITAMINS W/ MINERALS TAB 1 TABLET: TAB at 07:55

## 2019-04-17 RX ADMIN — POTASSIUM CHLORIDE 20 MEQ: 1.5 POWDER, FOR SOLUTION ORAL at 21:56

## 2019-04-17 RX ADMIN — Medication 5 MG: at 01:23

## 2019-04-17 RX ADMIN — POTASSIUM CHLORIDE 20 MEQ: 1.5 POWDER, FOR SOLUTION ORAL at 13:15

## 2019-04-17 RX ADMIN — CEFTAROLINE FOSAMIL 600 MG: 600 POWDER, FOR SOLUTION INTRAVENOUS at 00:36

## 2019-04-17 RX ADMIN — Medication 500000 UNITS: at 21:56

## 2019-04-17 RX ADMIN — FAMOTIDINE 20 MG: 20 TABLET, FILM COATED ORAL at 07:56

## 2019-04-17 RX ADMIN — ACETAMINOPHEN 650 MG: 325 TABLET ORAL at 22:05

## 2019-04-17 RX ADMIN — CEFTAROLINE FOSAMIL 600 MG: 600 POWDER, FOR SOLUTION INTRAVENOUS at 11:18

## 2019-04-17 RX ADMIN — DESMOPRESSIN ACETATE 40 MG: 0.2 TABLET ORAL at 21:55

## 2019-04-17 RX ADMIN — CLOPIDOGREL 75 MG: 75 TABLET, FILM COATED ORAL at 07:56

## 2019-04-17 RX ADMIN — OXYCODONE HYDROCHLORIDE AND ACETAMINOPHEN 2 TABLET: 5; 325 TABLET ORAL at 05:30

## 2019-04-17 RX ADMIN — DOXYCYCLINE HYCLATE 100 MG: 100 TABLET, COATED ORAL at 21:59

## 2019-04-17 RX ADMIN — FAMOTIDINE 20 MG: 20 TABLET, FILM COATED ORAL at 21:55

## 2019-04-17 RX ADMIN — OXYCODONE HYDROCHLORIDE AND ACETAMINOPHEN 2 TABLET: 5; 325 TABLET ORAL at 09:54

## 2019-04-17 RX ADMIN — FUROSEMIDE 20 MG: 10 INJECTION, SOLUTION INTRAMUSCULAR; INTRAVENOUS at 12:46

## 2019-04-17 RX ADMIN — Medication 15 ML: at 07:51

## 2019-04-17 RX ADMIN — PROPOFOL 10 MCG/KG/MIN: 10 INJECTION, EMULSION INTRAVENOUS at 20:00

## 2019-04-17 RX ADMIN — ALBUTEROL SULFATE 2.5 MG: 2.5 SOLUTION RESPIRATORY (INHALATION) at 08:17

## 2019-04-17 RX ADMIN — Medication 10 ML: at 21:55

## 2019-04-17 RX ADMIN — Medication 500000 UNITS: at 07:54

## 2019-04-17 RX ADMIN — ENOXAPARIN SODIUM 40 MG: 40 INJECTION SUBCUTANEOUS at 07:53

## 2019-04-17 RX ADMIN — Medication 500000 UNITS: at 16:25

## 2019-04-17 RX ADMIN — ASPIRIN 81 MG 81 MG: 81 TABLET ORAL at 07:55

## 2019-04-17 RX ADMIN — POTASSIUM CHLORIDE 20 MEQ: 29.8 INJECTION, SOLUTION INTRAVENOUS at 07:53

## 2019-04-17 RX ADMIN — POLYETHYLENE GLYCOL 3350 17 G: 17 POWDER, FOR SOLUTION ORAL at 07:54

## 2019-04-17 RX ADMIN — ALBUTEROL SULFATE 2.5 MG: 2.5 SOLUTION RESPIRATORY (INHALATION) at 14:21

## 2019-04-17 RX ADMIN — Medication 100 MG: at 07:53

## 2019-04-17 RX ADMIN — Medication 5 MG: at 06:19

## 2019-04-17 RX ADMIN — INSULIN LISPRO 4 UNITS: 100 INJECTION, SOLUTION INTRAVENOUS; SUBCUTANEOUS at 04:08

## 2019-04-17 RX ADMIN — INSULIN LISPRO 2 UNITS: 100 INJECTION, SOLUTION INTRAVENOUS; SUBCUTANEOUS at 07:51

## 2019-04-17 RX ADMIN — OXYCODONE HYDROCHLORIDE AND ACETAMINOPHEN 2 TABLET: 5; 325 TABLET ORAL at 00:47

## 2019-04-17 RX ADMIN — DIPHENHYDRAMINE HCL 25 MG: 25 TABLET ORAL at 22:06

## 2019-04-17 RX ADMIN — Medication 15 ML: at 21:55

## 2019-04-17 RX ADMIN — Medication 500000 UNITS: at 12:44

## 2019-04-17 ASSESSMENT — PAIN SCALES - GENERAL
PAINLEVEL_OUTOF10: 0
PAINLEVEL_OUTOF10: 8
PAINLEVEL_OUTOF10: 0
PAINLEVEL_OUTOF10: 0

## 2019-04-17 ASSESSMENT — PULMONARY FUNCTION TESTS
PIF_VALUE: 27
PIF_VALUE: 24
PIF_VALUE: 21
PIF_VALUE: 26
PIF_VALUE: 26
PIF_VALUE: 23
PIF_VALUE: 11

## 2019-04-17 ASSESSMENT — PAIN SCALES - WONG BAKER
WONGBAKER_NUMERICALRESPONSE: 0
WONGBAKER_NUMERICALRESPONSE: 0

## 2019-04-17 NOTE — PROGRESS NOTES
Smoking Cessation - topics covered   []  Health Risks  []  Benefits of Quitting   []  Smoking Cessation  []  Patient has no history of tobacco use  []  Patient is former smoker. []  No need for tobacco cessation education. []  Booklet given  []  Patient verbalizes understanding. []  Patient denies need for tobacco cessation education. []  Unable to meet with patient today. Will follow up as able. As patient is unable to be asked if he has a smoking history will defer tobacco cessation education at this time.   Aly Duncan  11:22 AM

## 2019-04-17 NOTE — PROGRESS NOTES
Occupational Therapy Not Seen Note    DATE: 2019  Name: Damon Frank  : 1965  MRN: [id number]    Patient not available for Occupational Therapy due to:     Other: Intubation/ not medically appropriate per RN MADISON Rivas following for ROM    Next Scheduled Treatment: Re-check 2019     Electronically signed by YANCY Mcqueen on 2019 at 8:34 AM

## 2019-04-17 NOTE — PROGRESS NOTES
[facility]  Daily Progress Note    Surgeon:  Dr. Roshni Borges  POD# 9  S/P :  Emerg Coronary artery bypass 4/7/19  Chest closure 4/8/19  EF: 45 %             Subjective:  Mr. Mango Torres is intubated. He has been off sedation and is tracking voice and has some spontaneous movements. He did squeeze a hand to command yesterday. Appears anxious at times. Physical Exam  Vital Signs: BP (!) 163/65   Pulse 108   Temp 101.6 °F (38.7 °C) (Axillary)   Resp 18   Ht 5' 8\" (1.727 m)   Wt 176 lb 2.4 oz (79.9 kg)   SpO2 97%   BMI 26.78 kg/m²      Admit Weight: Weight: 176 lb 5.9 oz (80 kg)   WEIGHTWeight: 176 lb 2.4 oz (79.9 kg)     General: intubated, now sedated. No apparent distress. Heart:Rhythm: rapid rate Pacing Wires Yes   Lungs: clear to auscultation bilaterally  Abdomen: soft, non tender, non distended, BSx4  Extremities: Trace edema  Wounds: clean and dry, healing appropriately.       Scheduled Meds:    metoprolol tartrate  25 mg Per NG tube BID    ceftaroline fosamil (TEFLARO) IVPB  600 mg Intravenous Q12H    insulin glargine  20 Units Subcutaneous Daily    insulin lispro  0-12 Units Subcutaneous Q6H    famotidine  20 mg Oral BID    enoxaparin  40 mg Subcutaneous Daily    nystatin  5 mL Oral 4x Daily    aspirin  81 mg Oral Daily    docusate  100 mg Oral Daily    dextrose  12.5 g Intravenous Once    albuterol  2.5 mg Nebulization Q6H    sodium chloride flush  10 mL Intravenous 2 times per day    sodium chloride flush  10 mL Intravenous 2 times per day    polyethylene glycol  17 g Oral Daily    chlorhexidine  15 mL Mouth/Throat BID    therapeutic multivitamin-minerals  1 tablet Oral Daily with breakfast    atorvastatin  40 mg Oral Nightly    clopidogrel  75 mg Oral Daily     Continuous Infusions:    niCARdipine      propofol 10 mcg/kg/min (04/17/19 1103)    sodium chloride 20 mL/hr (04/14/19 1141)    dextrose         Data:  CBC:   Recent Labs     04/16/19  0504 teflaro IV   Family on board with Trach and PEG if needed. Blood sugars still high, manage per IM   Free water flushes for hypernatremia   ABLA - Hgb stable    DVT ppx: Lovenox & SCD's while stationary  Patient is on BB, ASA, Statin therapy per protocol   Plan for discharge LTAC most likely    The above recommendations including medications and orders were discussed and agreed upon with Dr. Darry Meckel, the attending on service for the cardiothoracic surgery group today.      Sydnie Beverly, APRN, CNP

## 2019-04-17 NOTE — PROGRESS NOTES
[facility]   Progress Note                   Date:   4/17/2019  Patient name: Jennifer Murray  Date of admission:  4/7/2019  4:48 AM  MRN:   [id number]  YOB: 1965  PCP: No primary care provider on file. Reason for Admission: STEMI    Subjective:       S/P emergent CABG, POD#9    PEA Arrest, late presentation of STEMI with emergent bypass. Post op course complicated by neurological insult and poor neurological recovery with multiple areas of ischemic stroke      @ pt is febrile with tmax of 101  Labile SBP in 200, requiring the labetalol PRN 2 times overnight  More awake and alert, following commands in upper extremities      I/O last 3 completed shifts:   In: 1963 [I.V.:426; NG/GT:1537]  Out: 2640 [Urine:2640]  I/O this shift:  In: -   Out: 550 [Urine:550]      Medications:   Scheduled Meds:   metoprolol tartrate  25 mg Per NG tube BID    ceftaroline fosamil (TEFLARO) IVPB  600 mg Intravenous Q12H    insulin glargine  20 Units Subcutaneous Daily    insulin lispro  0-12 Units Subcutaneous Q6H    famotidine  20 mg Oral BID    enoxaparin  40 mg Subcutaneous Daily    nystatin  5 mL Oral 4x Daily    aspirin  81 mg Oral Daily    docusate  100 mg Oral Daily    dextrose  12.5 g Intravenous Once    albuterol  2.5 mg Nebulization Q6H    sodium chloride flush  10 mL Intravenous 2 times per day    sodium chloride flush  10 mL Intravenous 2 times per day    polyethylene glycol  17 g Oral Daily    chlorhexidine  15 mL Mouth/Throat BID    therapeutic multivitamin-minerals  1 tablet Oral Daily with breakfast    atorvastatin  40 mg Oral Nightly    clopidogrel  75 mg Oral Daily       Continuous Infusions:   niCARdipine      propofol 10 mcg/kg/min (04/17/19 1103)    sodium chloride 20 mL/hr (04/14/19 1141)    dextrose         CBC:   Recent Labs     04/16/19  0504 04/16/19  1123 04/17/19  0438   WBC 25.1* 28.6* 30.0*   HGB 10.0* 10.1* 9.8*    348 351     BMP:    Recent Labs 04/16/19  0504 04/16/19  1123 04/17/19  0438   * 144 149*   K 4.3 4.2 3.6*   * 110* 114*   CO2 19* 22 24   BUN 26* 25* 22*   CREATININE 0.48* 0.52* 0.49*   GLUCOSE 297* 324* 278*     Hepatic:   Recent Labs     04/16/19  1123   *   *   BILITOT 1.30*   ALKPHOS 197*     Troponin: No results for input(s): TROPONINI in the last 72 hours. BNP: No results for input(s): BNP in the last 72 hours. Lipids: No results for input(s): CHOL, HDL in the last 72 hours. Invalid input(s): LDLCALCU  INR:   Recent Labs     04/16/19  0504 04/16/19  1123 04/17/19  0438   INR 1.2 1.2 1.2       Objective:   Vitals: BP (!) 163/65   Pulse 108   Temp 101.6 °F (38.7 °C) (Axillary)   Resp 18   Ht 5' 8\" (1.727 m)   Wt 176 lb 2.4 oz (79.9 kg)   SpO2 97%   BMI 26.78 kg/m²   General appearance: intubated,follows commands  HEENT: Head: Normocephalic, no lesions,   Bilateral bruist   Neck: no JVD  Lungs:  Mechanical ventilator  Heart: regular rate and rhythm, S1, S2 normal, no murmur, click, rub or gallop  Abdomen: soft, non-tender; bowel sounds normal  Extremities: No LE edema        EKG: Sinus tachycardia with anteroseptal infarct      Coronary Angiography: 4/7/19  LMCA: has distal 60% stenosis. LAD: has ostial 90% stenosis and long proximal 80% stenosis. D1 has proximal 70% stenosis. LCx: has 20% stenosis. OM1 is very small. OM2 has proximal 90% stenosis. RCA: has proximal 50% stenosis, mid 60% stenosis and distal 80% stenosis. Ramus: has ostial 99% stenosis.     The LV gram was performed in the DUTTON 30 position. LVEF: 15%. LV Wall Motion: severe hypokinesis of the anterior wall and mid inferior wall with apical akinesis. Echo 4/12/19:  Left ventricle is normal in size. Global left ventricular systolic function is mildly reduced. Calculated ejection fraction is 40. Left ventricular wall thickness is mildly increased.  Grade II diastolic dysfunction  Normal valvular above as needed. Consideration for diuresis if no longer need higher BP    Thank you for allowing me to participate in the care of this patient, please do not hesitate to call if you have any questions. Torres Malone DO, [facility], [address] [facility]  [facility]. [facility]  [phone]

## 2019-04-17 NOTE — PROGRESS NOTES
[facility]    Progress Note    4/17/2019    9:39 AM    Name:   Celine Yuen  MRN:     [id number]     Acct:      [de-identified]   Room:   [address] [place] [address] Day:  8  Admit Date:  4/7/2019  4:48 AM    PCP:   No primary care provider on file. Code Status:  Full Code    Subjective:     C/C:   Chief Complaint   Patient presents with    Cardiac Arrest     diff breather, brought in on cpap with elevations in EKG on EMS arrival.      Interval History Status: not changed  Continued to be awake off sedation  Seems agitated trying to fight the vent and also trying to get out of bed  Noted mitts to hands, not following motor commands though  No fevers, chills reported overnight  Continued to have some secretions but improving      Brief History:     The patient is a 46 y.o. male who is admitted for  cardiac arrest and STEMI. Patient was admitted on 4/7/19 after he was brought in by EMS for dyspnea, acute respiratory failure. Patient was placed on CPAP and nebulization treatment. Initial EKG showed possible anterior septal STEMI. On arrival to [facility] emergency room patient had PEA arrest and was resuscitated per ACS protocol with 3 rounds of CPR and epinephrine with successful ROSC. Patient underwent emergent cardiac cath and showed multivessel CAD with severe LV dysfunction with EF 15% and severe hypokinesis. Patient had emergent CABG x 4 on 4/7/19 with closure of chest wall on 4/8/19. [facility] Patient developed acute kidney injury post-CAB with creatinine peaking at 1.99 which improved after fluid resuscitation and pressor support. Patient was taken off pressor support. Patient was asked. On 4/10/19. Patient had altered mental status upon weaning from  sedatives . CT head showed scattered areas of encephalomalacia in bifrontal and left parietal areas consistent with remote infarct and possible subacute infarct in left lower parietal region.   Patient has electrical activity) ([facility] and PVD (peripheral vascular disease) ([facility] Social History:        Family History: History reviewed. No pertinent family history. Vitals:  BP (!) 177/65   Pulse 111   Temp 98.7 °F (37.1 °C) (Axillary)   Resp 21   Ht 5' 8\" (1.727 m)   Wt 176 lb 2.4 oz (79.9 kg)   SpO2 96%   BMI 26.78 kg/m²   Temp (24hrs), Av.8 °F (37.1 °C), Min:97.9 °F (36.6 °C), Max:100 °F (37.8 °C)    Recent Labs     19  1620 19  2121 19  0407 19  0727   POCGLU 262* 246* 221* 195*     I/O (24Hr):     Intake/Output Summary (Last 24 hours) at 2019 0939  Last data filed at 2019 0600  Gross per 24 hour   Intake 1963 ml   Output 2140 ml   Net -177 ml     Labs:    Lab Results   Component Value Date/Time    SPECIAL NOT REPORTED 2019 07:55 AM     Lab Results   Component Value Date/Time    CULTURE NO GROWTH 2019 07:55 AM     Lab Results   Component Value Date    POCPH 7.477 2019    POCPCO2 37.4 2019    POCPO2 83.0 2019    POCHCO3 27.6 2019    NBEA NOT REPORTED 2019    PBEA 4 2019    ITY3YDR 29 2019    BOED1ZJH 97 2019    FIO2 40.0 2019     Physical Examination:        General appearance: awake, thrashing head in bed, not following any commands but   Mental Status: as above  Lungs:  clear to auscultation bilaterally, normal effort  Heart:  regular rate and rhythm, no murmur  Abdomen:  soft, nontender, nondistended, normal bowel sounds  Extremities:  no edema, redness, tenderness in the calves  Skin:  no gross lesions, rashes, induration    Assessment:        Primary Problem  Cardiac arrest [facility])    Active Hospital Problems    Diagnosis Date Noted    Acute cerebral infarction associated with systemic hypoxia or ischemia [I63.89]     Multi infarct state [I69.30]     Fever chills [R50.9]     Hepatitis [E51.3]     Metabolic encephalopathy [T98.78]     Abnormal CT of the head [R93.0]     Cardiac arrest (HCC) [I46.9] 04/07/2019    Severe left ventricular systolic dysfunction [V05.7] 04/07/2019    Acute respiratory failure with hypoxemia ([facility]) [J96.01] 04/07/2019    Cardiogenic pulmonary edema ([facility] [I50.1] 04/07/2019    PAD (peripheral artery disease) ([facility]) [I73.9] 04/07/2019    Acute ST elevation myocardial infarction (STEMI) of anterior wall ([facility]) [I21.09]     Subacute osteomyelitis of right tibia [facility]) [H02.115]     Exposed orthopaedic hardware ([facility] [P40.453M]      Plan:        - Cardiac arrest: PEA needed 3 rounds of chest compressions  - Multivessel CAD, status post CABG now  - Encephalopathy, significant, EEG with no definite seizures. MRI with multiple small strokes in cerebral and cerebellar areas. Improved today, much more awake, not following commands consistently but was moving all extremities including right arm today   - CVA, multiple, small, bilateral cerebral area and left cerebellum area. NO regional large infarcts, continued close follow up of clinical status, sedation holiday today  - Hypertension, on drips, likely worse drom agitattion  - Acute respiratory failure  - Diabetes, poorly controlled.  lantus continued along with sliding scale now  - Fevers with underlying chronic osteomyelitis, cefatorline per ID team. Worsening leukocytosis, repeat X ray today, if continued infiltration might need to add gram negative coverage in setting of worsened leukocytosis    Discussed with son at bedside      Melchor Moreno MD  4/17/2019  9:39 AM

## 2019-04-17 NOTE — CONSULTS
Endovascular Neurosurgery Consult    Pt Name: Colleen Dorantes  MRN: [id number]  YOB: 1965  Date of evaluation: 4/17/2019  Primary Care Physician: No primary care provider on file. Reason for evaluation: b/l ICA stenosis     SUBJECTIVE:   History of Chief Complaint:    Colleen Dorantes is a 46 y.o. male with a recent hx of PEA arrest s/p CABG on 4/7 he was found to have multiple small acute strokes in both hemisphere largest in the right cerebellum area. Carotid U/S with 70-99% stenosis of both ICA and possible occlusion in the left ICA. Patient exam improved and now he is still intubated and awake, follow commands, antigravity in the left arm and left leg with some movement at least 2/5, RUE can move the wrist and no proximal movement, right leg no movement. He is on ASA and Plavix. Patient bP above 160 SBP. Allergies  has no allergies on file.   Medications  Prior to Admission medications    Not on File    Scheduled Meds:   metoprolol tartrate  25 mg Per NG tube BID    furosemide  20 mg Intravenous BID    potassium chloride  20 mEq Oral BID    doxycycline hyclate  100 mg Oral 2 times per day    insulin glargine  20 Units Subcutaneous Daily    insulin lispro  0-12 Units Subcutaneous Q6H    famotidine  20 mg Oral BID    enoxaparin  40 mg Subcutaneous Daily    nystatin  5 mL Oral 4x Daily    aspirin  81 mg Oral Daily    docusate  100 mg Oral Daily    dextrose  12.5 g Intravenous Once    albuterol  2.5 mg Nebulization Q6H    sodium chloride flush  10 mL Intravenous 2 times per day    sodium chloride flush  10 mL Intravenous 2 times per day    polyethylene glycol  17 g Oral Daily    chlorhexidine  15 mL Mouth/Throat BID    therapeutic multivitamin-minerals  1 tablet Oral Daily with breakfast    atorvastatin  40 mg Oral Nightly    clopidogrel  75 mg Oral Daily     Continuous Infusions:   niCARdipine      propofol 10 mcg/kg/min (04/17/19 1103)    sodium chloride 20 mL/hr (04/14/19 1141)    dextrose       PRN Meds:.fentanNYL, sodium chloride flush, labetalol, naloxone, albuterol, sodium chloride flush, magnesium hydroxide, sodium chloride flush, calcium chloride IVPB, magnesium sulfate, potassium chloride, acetaminophen, acetaminophen, oxyCODONE-acetaminophen **OR** oxyCODONE-acetaminophen, diphenhydrAMINE, bisacodyl, ondansetron, hydrALAZINE, albumin human, glucose, dextrose, glucagon (rDNA), dextrose  Past Medical History   has a past medical history of CAD (coronary artery disease), Cardiac arrest ([facility] Osteolysis, PEA (Pulseless electrical activity) ([facility] and PVD (peripheral vascular disease) ([facility] Past Surgical History   has a past surgical history that includes Cardiac catheterization (04/07/2019); Femur Surgery; Tonsillectomy; Coronary artery bypass graft (04/07/2019); Hardware Removal (Right, 04/07/2019); Tibia fracture surgery (Right); Coronary artery bypass graft (N/A, 4/7/2019); and RECONSTRUCTIVE REPAIR STERNAL (N/A, 4/8/2019). Social History   has no tobacco history on file. has no alcohol history on file. has no drug history on file. Family History  family history is not on file. Review of Systems:  CONSTITUTIONAL:  negative for fevers, chills, fatigue and malaise    EYES:  negative for double vision, blurred vision and photophobia     HEENT:  negative for tinnitus, epistaxis and sore throat    RESPIRATORY:  negative for cough, shortness of breath, wheezing    CARDIOVASCULAR:  negative for chest pain, palpitations, syncope, edema    GASTROINTESTINAL:  negative for nausea, vomiting    GENITOURINARY:  negative for incontinence    MUSCULOSKELETAL:  negative for neck or back pain    NEUROLOGICAL:  Negative for weakness and tingling  negative for headaches and dizziness    PSYCHIATRIC:  negative for anxiety      Review of systems otherwise negative.       OBJECTIVE:   Vitals: BP (!) 160/70   Pulse 96   Temp 97.7 °F (36.5 °C) (Oral)   Resp 20   Ht 5' 8\" (1.727 m)   Wt 176 lb 2.4 oz (79.9 kg)   SpO2 97%   BMI 26.78 kg/m²     General appearance: intubated  Lungs: CTAB  Heart: RRR  Abdomen: soft, NTND, bowel sounds normal    Neuro exam: intubated, Follows simple commands. CN II-XII: no gross facial assymmetry, pupils 2 mm reactive to light bilaterally, EOMI, VFF. Jocelyn spontaneously against gravity in the RUE, LLE at least 2/5, RUE distal 2/5, proximal 0/5, RLE 0/5. Pedal Pulses  2+      LABS:     Recent Labs     04/16/19  0504 04/16/19  1123 04/17/19  0438   WBC 25.1* 28.6* 30.0*   HGB 10.0* 10.1* 9.8*   HCT 31.7* 31.2* 32.7*    348 351   * 144 149*   K 4.3 4.2 3.6*   * 110* 114*   CO2 19* 22 24   BUN 26* 25* 22*   CREATININE 0.48* 0.52* 0.49*   MG 2.0 1.9 2.1   CALCIUM 8.9 8.7 8.8   INR 1.2 1.2 1.2   AST  --  246*  --    ALT  --  795*  --    BILITOT  --  1.30*  --    BILIDIR  --  0.61*  --      Recent Labs     04/16/19  1123   ALKPHOS 197*   *   *   BILITOT 1.30*   BILIDIR 0.61*   LABALBU 2.7*     RADIOLOGY:   Images were personally reviewed including:  As per HPI      IMPRESSIONS:   46years old male s/p PEA arrest and CABG 4/7, found ot have multiple b/l hemisphere small strokes and cerebral strokes, Carotid U/S with b/l severe stenosis and possible occlusion n the LICA. EVNS continued for ICA stenosis/occlsuion. PLANS:   1. Please obtain CTA head and neck. 2. Continue ASA and Plavix. 3. Continue statin   4. Avoid symptomatic hypotension. Thank you for the interesting evaluation. Further recommendations to follow.       Janeth Asif MD  Pager [phone]  Stroke, [facility]  [facility]  Electronically signed 4/17/2019 at 4:19 PM

## 2019-04-17 NOTE — PLAN OF CARE
PRN FOR BRONCHOSPASM/BRONCHOCONSTRICTION     ? IMPROVE AERATION/BREATH SOUNDS  ? ADMINISTER BRONCHODILATOR THERAPY AS APPROPRIATE  ? ASSESS BREATH SOUNDS  ? IMPLEMENT AEROSOL/MDI PROTOCOL  ? PATIENT EDUCATION AS NEEDED    PROVIDE ADEQUATE OXYGENATION WITH ACCEPTABLE SP02/ABG'S    ? IDENTIFY APPROPRIATE OXYGEN THERAPY  ? MONITOR SP02/ABG'S AS NEEDED   ?    PATIENT EDUCATION AS NEEDED    NONINVASIVE VENTILATION    PROVIDE OPTIMAL VENTILATION/ACCEPTABLE SPO2   IMPLEMENT NONINVASIVE VENTILATION PROTOCOL   MAINTAIN ACCEPTABLE SPO2   ASSESS SKIN INTEGRITY/BREAKDOWN SCORE   PATIENT EDUCATION AS NEEDED   BIPAP AS NEEDED

## 2019-04-17 NOTE — PROGRESS NOTES
Neurology Nurse Practitioner Progress Note      INTERVAL HISTORY: This is a 46 y.o.  male admitted 4/7/2019 for Cardiac arrest ([facility] [I46.9]. This is a follow-up neurology progress note. The patient was examined and the chart was reviewed. Discussed with the RN. There were no acute events overnight. HPI: Todd Servin is a 46 y.o. male with H/O HTN, DM, PAD, prior head trauma (fall), osteolysis, who was admitted 4/7/2019 with Cardiac arrest ([facility] [I46.9]. According to the medical records, pt called EMS due to worsening shortness of breath. He had audible rales & was placed on C-PAP; breathing treatment was given along with Solu-Medrol 125 mg IV &  mg. His EKG was concerning for STEMI. Pt went into cardiac arrest as he was bring brought into the ED. EMS started CPR & he was placed on defibrillator. He was in PEA arrest. Got intubated & got IV Heparin bolus. He underwent urgent cardiac cath & found to have multivessel CAD with severe LV systolic dysfunction; occluded L external iliac artery & severe diffuse disease of R iliac artery. Later, he was taken to OR where he arrested again. He underwent emergent CABG x 4 (4/7). Got admitted in cardiac ICU on high dose vasopressors. His EF improved from 15% to 40% post-surgery. Found out to have chronic infection RLE s/p R proximal tibial ORIF with exposed hardware (placed in 2005). Nail was removed & sent for Cx. Pt was started on vancomycin initially; later, got switched to Teflaro. Pt developed ischemic hepatitis & ALMITA post-procedure. Reportedly, pt was able to follow simple commands, moving all limbs, trying to pull at his tubes on 4/10. Sedation was increased due to the agitation. Later, he was weaned off; Precedex & Versed drips have been off since the morning of 4/11. It was noticed that pt stopped responding to commands with no spontaneous movements reported. He had brainstem reflexes. Neurology was consulted on 4/12.          metoprolol tartrate  25 mg Per NG tube BID    ceftaroline fosamil (TEFLARO) IVPB  600 mg Intravenous Q12H    insulin glargine  20 Units Subcutaneous Daily    insulin lispro  0-12 Units Subcutaneous Q6H    famotidine  20 mg Oral BID    enoxaparin  40 mg Subcutaneous Daily    nystatin  5 mL Oral 4x Daily    aspirin  81 mg Oral Daily    docusate  100 mg Oral Daily    dextrose  12.5 g Intravenous Once    albuterol  2.5 mg Nebulization Q6H    sodium chloride flush  10 mL Intravenous 2 times per day    sodium chloride flush  10 mL Intravenous 2 times per day    polyethylene glycol  17 g Oral Daily    chlorhexidine  15 mL Mouth/Throat BID    therapeutic multivitamin-minerals  1 tablet Oral Daily with breakfast    atorvastatin  40 mg Oral Nightly    clopidogrel  75 mg Oral Daily       Past Medical History:   Diagnosis Date    CAD (coronary artery disease) 04/07/2019    Cardiac arrest ([facility] 04/07/2019    Osteolysis 04/07/2019    R Knee hardware    [facility] 04[facility]07/[facility]    PVD (peripheral vascular disease) ([facility] 04/07/2019       Past Surgical History:   Procedure Laterality Date    CARDIAC CATHETERIZATION  04/07/2019    MVD-Stv    CORONARY ARTERY BYPASS GRAFT  04/07/2019    x4 per Dr. Earl Bowen, [address] [place] [address] GRAFT N/A 4/7/2019    EMERGENT CPR,  CORONARY ARTERY BYPASS X4, ON PUMP ,CHEST LEFT OPEN; ANETTE ANGELA PER ANESTHESIA performed by Jane Smith MD at [address] [place] [address],[address]      unsure if correct known tibial surgery    HARDWARE REMOVAL Right 04/07/2019    R Knee internal screw protruding through skin    RECONSTRUCTIVE REPAIR STERNAL N/A 4/8/2019    STERNAL  WOUND WASHOUT AND CLOSURE S/P CABG performed by Jane Smith MD at [facility] Right     Fell off roof, external fixation and internal hardware    TONSILLECTOMY         PHYSICAL EXAM:      Blood pressure (!) 153/59, pulse 112, temperature 98.7 °F (37.1 °C), temperature source Axillary, resp. rate 16, height 5' 8\" (1.727 m), weight 176 lb 2.4 oz (79.9 kg), SpO2 95 %. Limited Neurological Examination:  Pt was intubated; is on low dose of propofol. Breathing over the went  Eyes open with visual tracking & fixation  Nodding head appropriately; knows he is in a hospital, denied any pain  Trying to mouth words  Knew his right & left  He followed simple commands, moved his UEs, had b/l mittens on. Moved L foot only   Has dressing on RLE with no withdrawal/grimace on painful stimuli  UEs with intact DTRs; no ankle clonus  R plantar - mute       DATA      Lab Results   Component Value Date    WBC 30.0 (H) 04/17/2019    HGB 9.8 (L) 04/17/2019    HCT 32.7 (L) 04/17/2019     04/17/2019     (H) 04/16/2019     (H) 04/16/2019     (H) 04/17/2019    K 3.6 (L) 04/17/2019     (H) 04/17/2019    CREATININE 0.49 (L) 04/17/2019    BUN 22 (H) 04/17/2019    CO2 24 04/17/2019    INR 1.2 04/17/2019    LABA1C 10.8 (H) 04/07/2019           CT HEAD (4/12/2019):   Scattered areas of encephalomalacia, bifrontal & high L parietal areas; remote infarcts. Subacute infarct in the L lower parietal region? Dystrophic calcification in L frontal lobe? Chronic microvascular changes. MRI BRAIN (4/14/2019):   Multiple acute ischemic infarcts in the R brachium pontis, R cerebellum & b/l cerebral white matter. Central embolic etiology is suggested. No hemorrhage. B/L anterior frontal & high L frontoparietal encephalomalacia; prior insult. Absent flow void in the L ICA consistent with high-grade proximal stenosis vs occlusion. CAROTID DOPPLER (4/12/2019):      CTA HEAD & NECK      ECHO (4/7/2019): EF 15%. Severe hypokinesis of anterior LV wall & mid inferior wall with apical akinesis      REPEAT ECHO (4/122019): EF 40%. Grade II diastolic dysfunction. LV wall thickness is mildly increased       EEG (4/12/2019):  Moderate diffuse encephalopathy                          IMPRESSION & PLAN: 46 y.o.  male admitted with  Encephalopathy; multifactorial including s/p cardiac arrest x 2, sepsis, multi-infarct state, ALMITA & ischemic hepatitis. Pt stays intubated & on low dose of propofol; breathing over the vent; tracking visually; following simple commands; RLE with no sponatneous movement; R plantar - mute    PEA arrest x 2; multivessel CAD; s/p emergent CABG x 4 (4/7/19)    Multi-infarct state; continue ASA 81 mg QD, Plavix 75 mg QD & Lipitor 40 mg HS    L ICA - high-grade stenosis vs occlusion; endovascular recommended CTA head & neck    Febrile; chronic infection RLE s/p R proximal tibial ORIF with exposed hardware (placed in 2005); non-compliant; is on Teflaro as per ID team    Ischemic hepatitis; ALMITA    Pt weaned for only 1/2 hr today (4/17); family agreed for trach/PEG tube placement.  Palliative care on board    Continue PT/OT    Comorbid conditions - HTN, DM, prior head trauma, PAD, osteolysis    Will follow

## 2019-04-17 NOTE — CARE COORDINATION
Spoke with patient's son Chace Talamantes, who states he is ok with trach/peg if needed. Discussed LTACH, agreeable to [address] [facility].   Referral faxed, discussed with liaison [facility]

## 2019-04-17 NOTE — PROGRESS NOTES
need Midline Catheter Insertion:   · Patient will need PICC line Insertion:  · Patient will need: Home IV , [place],  SNF,  LTAC: TBD  · Patient will need outpatient wound care:TBD    Chief complaint/reason for consultation:   · Emergent CABG  · Protruding orthopedic nail from Rt knee      History of Present Illness:   Jonny Mullen is a 46y.o.-year-old  male who was initially admitted on 4/7/2019. Patient seen at the request of [facility] Parents. INITIAL HISTORY:     Patient apparently developed chest pain and called EMS. Had cardiac arrest at the scene, was resuscitated and brought to ER where he had second cardiac arrest. Was resuscitated once again and taken to OR for emergent CABG x 4. He had another cardiac arrest in OR. Has survived the above and is back in the ICU. Found to have area of malodor on lateral aspect Rt knee, corresponding to a protruding orthopedic screw from a remote ORIF of the Rt  Tibial plateau. The screw has been removed and sent for culture. X rays show healed fracture of the proximal tibia. There is additional hardware in place that will likely need to be removed if patient survives. CURRENT EVALUATION : 4/17/2019    Patient seen and examined this morning. Remains off sedation. Eyes open this morning and moving extremities. Per nursing he is beginning to follow simple commands and track nurse around room. Appears agitated and restless. Trach and peg possibly per notes. Family discussing. Afebrile overnight. VS stable, hypertensive this morning. /77. Off dobutamine. Tachycardic this morning, 118. Remains intubated. CT of head 4-13-19 showed scattered infarcts of different ages. MRI follow up 4/14 showed multiple new embolic strokes. Absent flow Lt carotid artery. Ultrasound 4-15-19 with occlusion of Lt internal carotid artery. Neurology seen and evaluated 4-15-19. They ordered carotid ultrasound and also consulted neuroendovascular. Awaiting recs. Blood cultures 4/11 x 2 with no growth to date. Urine culture 4/12 with no growth to date. Sputum culture 4/12 with mixed bacterial morphotypes. Leg screw: No growth    Hypernatremic this am again, Na 149. WBCs trended up 17.5 -> 25.1 -> 30. Hgb stable at 9.1. ALMITA has resolved with creatinine of 0.49 today. LFTs ordered showed Albumin 2.7. Bilirubin 1.48, .85 direct. Total protein 5.5. Amiodarone discontinued per cardiology. Aldean Riches scan performed today showed no evidence of cholecystitis. Echocardiogram showed mildly reduced ejection fraction. No clot visualized. CXR 4-15-19: Bilateral effusions, parahilar and lower zone airspace opacities. Retrocardiac consolidation. Changes are those of CHF. Currently on Ceftaroline. Will plan to d/c ceftaroline and switch to po doxycycline po for suppression of osteomyelitis      Cultures:  Wound:  · Orthopedic screw: Leg screw cultures found. Direct exam showed many gram + cocci in clusters and few gram - rods, NGTD. Lab reports quality of sample is questioned and to interpret results carefully. Blood:  · Blood cultures 4/11 x 2 with no growth to date. Urine:  · Urine culture 4/12 with no growth to date. Sputum  · Sputum culture 4/12 with mixed bacterial morphotypes. Discussed with RN. I have personally reviewed the past medical history, past surgical history, medications, social history, and family history, and I have updated the database accordingly.   Past Medical History:     Past Medical History:   Diagnosis Date    CAD (coronary artery disease) 04/07/2019    Cardiac arrest ([facility] [address] 04/07/2019    Osteolysis 04/07/2019    R Knee hardware    PEA (Pulseless electrical activity) ([address] 04/07/2019    PVD (peripheral vascular disease) ([facility] [address] 04/07/2019       Past Surgical  History:     Past Surgical History:   Procedure Laterality Date    CARDIAC CATHETERIZATION  04/07/2019    MVD-Stv    CORONARY ARTERY BYPASS GRAFT 04/07/2019    x4 per Dr. Zaria Roth, [facility] GRAFT N/A 4/7/2019    EMERGENT CPR,  CORONARY ARTERY BYPASS X4, ON PUMP ,CHEST LEFT OPEN; SWAN, ANGELA PER ANESTHESIA performed by Ev Lawson MD at [address] [facility]      unsure if correct known tibial surgery    HARDWARE REMOVAL Right 04/07/2019    R Knee internal screw protruding through skin    RECONSTRUCTIVE REPAIR STERNAL N/A 4/8/2019    STERNAL  WOUND WASHOUT AND CLOSURE S/P CABG performed by Ev Lawson MD at [address] [facility] off roof, external fixation and internal hardware    TONSILLECTOMY         Medications:      metoprolol tartrate  12.5 mg Per NG tube BID    ceftaroline fosamil (TEFLARO) IVPB  600 mg Intravenous Q12H    insulin glargine  20 Units Subcutaneous Daily    insulin lispro  0-12 Units Subcutaneous Q6H    famotidine  20 mg Oral BID    enoxaparin  40 mg Subcutaneous Daily    nystatin  5 mL Oral 4x Daily    aspirin  81 mg Oral Daily    docusate  100 mg Oral Daily    dextrose  12.5 g Intravenous Once    albuterol  2.5 mg Nebulization Q6H    sodium chloride flush  10 mL Intravenous 2 times per day    sodium chloride flush  10 mL Intravenous 2 times per day    polyethylene glycol  17 g Oral Daily    chlorhexidine  15 mL Mouth/Throat BID    therapeutic multivitamin-minerals  1 tablet Oral Daily with breakfast    atorvastatin  40 mg Oral Nightly    clopidogrel  75 mg Oral Daily       Social History:     Social History     Socioeconomic History    Marital status: Unknown     Spouse name: Not on file    Number of children: Not on file    Years of education: Not on file    Highest education level: Not on file   Occupational History    Not on file   Social Needs    Financial resource strain: Not on file    Food insecurity:     Worry: Not on file     Inability: Not on file    Transportation needs:     Medical: Not on file Non-medical: Not on file   Tobacco Use    Smoking status: Not on file   Substance and Sexual Activity    Alcohol use: Not on file    Drug use: Not on file    Sexual activity: Not on file   Lifestyle    Physical activity:     Days per week: Not on file     Minutes per session: Not on file    Stress: Not on file   Relationships    Social connections:     Talks on phone: Not on file     Gets together: Not on file     Attends Jainism service: Not on file     Active member of club or organization: Not on file     Attends meetings of clubs or organizations: Not on file     Relationship status: Not on file    Intimate partner violence:     Fear of current or ex partner: Not on file     Emotionally abused: Not on file     Physically abused: Not on file     Forced sexual activity: Not on file   Other Topics Concern    Not on file   Social History Narrative    Not on file       Family History:   History reviewed. No pertinent family history. Allergies:   Patient has no allergy information on record. Review of Systems:   Unable to provide. Sedated on ventilator.     Physical Examination :     Patient Vitals for the past 8 hrs:   BP Temp Temp src Pulse Resp SpO2 Weight   04/17/19 0722 (!) 199/77 98.7 °F (37.1 °C) Axillary -- -- -- --   04/17/19 0630 (!) 156/70 -- -- 118 22 98 % --   04/17/19 0600 (!) 172/74 -- -- 111 23 98 % --   04/17/19 0530 (!) 161/81 -- -- 114 20 98 % --   04/17/19 0500 (!) 176/67 -- -- 107 18 99 % --   04/17/19 0430 (!) 146/60 97.9 °F (36.6 °C) Axillary 101 18 98 % --   04/17/19 0417 -- -- -- 97 14 95 % --   04/17/19 0400 (!) 175/100 -- -- 102 22 100 % --   04/17/19 0330 (!) 157/61 -- -- 96 18 95 % 176 lb 2.4 oz (79.9 kg)   04/17/19 0230 (!) 176/80 -- -- 95 17 100 % --   04/17/19 0200 (!) 140/54 -- -- 90 15 100 % --   04/17/19 0130 (!) 134/59 -- -- 94 18 98 % --   04/17/19 0100 (!) 161/60 -- -- 100 16 99 % --   04/17/19 0030 129/65 -- -- 101 17 99 % --   04/17/19 0000 (!) 145/59 98.6 °F (37 °C) Axillary 97 15 99 % --     General Appearance: Sedated on vent  Head:  Normocephalic, no trauma  Eyes: Pupils equal, round, reactive to light; sclera anicteric; conjunctivae pink. No embolic phenomena. ENT: Oropharynx clear, without erythema, exudate, or thrush. No tenderness of sinuses. Mouth/throat: mucosa pink and moist. No lesions. Dentition in good repair. Neck:Supple, without lymphadenopathy. Thyroid normal, No bruits. Pulmonary/Chest: Coarse sounds  Cardiovascular: Regular rate and rhythm without murmurs, rubs, or gallops. Abdomen: Soft, non tender. Bowel sounds normal. No organomegaly  All four Extremities: No cyanosis, clubbing, edema, or effusions. Neurologic: Unable to assess. Sedated. Skin: Warm and dry with good turgor. Signs of peripheral arterial insufficiency. Rt lateral knee with open incision with old blood at site of prior protruding screw. Chest incision closed. Medical Decision Making -Laboratory:   I have independently reviewed/ordered the following labs:    CBC with Differential:   Recent Labs     04/16/19  1123 04/17/19  0438   WBC 28.6* 30.0*   HGB 10.1* 9.8*   HCT 31.2* 32.7*    351     BMP:   Recent Labs     04/16/19  1123 04/17/19  0438    149*   K 4.2 3.6*   * 114*   CO2 22 24   BUN 25* 22*   CREATININE 0.52* 0.49*   MG 1.9 2.1     Hepatic Function Panel:   Recent Labs     04/16/19  1123   PROT 6.3*   LABALBU 2.7*   BILIDIR 0.61*   IBILI 0.69   BILITOT 1.30*   ALKPHOS 197*   *   *     No results for input(s): RPR in the last 72 hours. No results for input(s): HIV in the last 72 hours. No results for input(s): BC in the last 72 hours.   Lab Results   Component Value Date    MUCUS NOT REPORTED 04/14/2019    RBC 3.23 04/17/2019    TRICHOMONAS NOT REPORTED 04/14/2019    WBC 30.0 04/17/2019    YEAST NOT REPORTED 04/14/2019    TURBIDITY CLOUDY 04/14/2019     Lab Results   Component Value Date    CREATININE 0.49 04/17/2019    GLUCOSE 278 04/17/2019       Medical Decision Making-Imaging:     EXAMINATION:   3 XRAY VIEWS OF THE RIGHT KNEE       4/7/2019 5:00 pm       COMPARISON:   04/07/2019       HISTORY:   ORDERING SYSTEM PROVIDED HISTORY: s/p Hardware removal at bedside   TECHNOLOGIST PROVIDED HISTORY:   s/p Hardware removal at bedside       FINDINGS:   Interval removal of anterior superior fixating screw.  No periprosthetic   fracture.  Chronic deformity of the proximal fibula.  Degenerative changes of   knee and tib-fib syndesmosis.         Impression   Removal of anterior-superior fixating screw without interval change otherwise     EXAMINATION:   3 XRAY VIEWS OF THE RIGHT KNEE       4/7/2019 3:28 pm       COMPARISON:   None.       HISTORY:   ORDERING SYSTEM PROVIDED HISTORY: Trauma/Fracture   TECHNOLOGIST PROVIDED HISTORY:   Trauma/Fracture       FINDINGS:   Sideplate and 4 screws transfix the proximal tibia laterally.  Old healed   fracture proximal fibular neck and head.  Spurring lateral tibia plateau. Proximal tibiofibular syndesmosis.  Visualized femur and patella normal.   Soft tissues unremarkable.           Impression   ORIF proximal tibia.  No acute fracture.         EXAMINATION:   SINGLE XRAY VIEW OF THE CHEST       4/10/2019 6:47 am       COMPARISON:   Chest radiograph performed 04/09/2019.       HISTORY:   ORDERING SYSTEM PROVIDED HISTORY: post op   TECHNOLOGIST PROVIDED HISTORY:   post op       FINDINGS:   There is redemonstration of bilateral pulmonary congestion/edema that is   similar compared to prior.  There are bibasilar effusions. Fif[place] Plump is no   pneumothorax.  The heart is prominent.  The upper abdomen is unremarkable.    The extrathoracic soft tissues are unremarkable. Ga Plump is an endotracheal   tube with the tip in the midtrachea.  There is a gastric tube with the tip in   the proximal aspect of the stomach.  The right internal jugular line has been   removed.  The [place]Carly catheter has been removed.         Impression   Cardiomegaly and bilateral pulmonary congestion/edema that is similar   compared to prior.       Bibasilar effusions and stable endotracheal tube and gastric tube.             Medical Decision Making-Other: Thank you for allowing us to participate in the care of this patient. Please call with questions. Almeda Dance [place]     ATTESTATION:    I have discussed the case, including pertinent history and exam findings with the residents. I have seen and examined the patient and the key elements of the encounter have been performed by me. I have reviewed the laboratory data, other diagnostic studies and discussed them with the residents. I have updated the medical record where necessary. I agree with the assessment, plan and orders as documented by the resident.     Ashley Stern MD.

## 2019-04-18 ENCOUNTER — APPOINTMENT (OUTPATIENT)
Dept: CT IMAGING | Age: 54
DRG: 165 | End: 2019-04-18
Payer: MEDICAID

## 2019-04-18 LAB
ALLEN TEST: POSITIVE
ANION GAP SERPL CALCULATED.3IONS-SCNC: 11 MMOL/L (ref 9–17)
BUN BLDV-MCNC: 20 MG/DL (ref 6–20)
BUN/CREAT BLD: ABNORMAL (ref 9–20)
CALCIUM SERPL-MCNC: 8.6 MG/DL (ref 8.6–10.4)
CHLORIDE BLD-SCNC: 114 MMOL/L (ref 98–107)
CO2: 25 MMOL/L (ref 20–31)
CREAT SERPL-MCNC: 0.46 MG/DL (ref 0.7–1.2)
ESTIMATED AVERAGE GLUCOSE: 240 MG/DL
FIO2: 40
GFR AFRICAN AMERICAN: >60 ML/MIN
GFR NON-AFRICAN AMERICAN: >60 ML/MIN
GFR SERPL CREATININE-BSD FRML MDRD: ABNORMAL ML/MIN/{1.73_M2}
GFR SERPL CREATININE-BSD FRML MDRD: ABNORMAL ML/MIN/{1.73_M2}
GLUCOSE BLD-MCNC: 195 MG/DL (ref 75–110)
GLUCOSE BLD-MCNC: 214 MG/DL (ref 75–110)
GLUCOSE BLD-MCNC: 221 MG/DL (ref 75–110)
GLUCOSE BLD-MCNC: 234 MG/DL (ref 75–110)
GLUCOSE BLD-MCNC: 260 MG/DL (ref 75–110)
GLUCOSE BLD-MCNC: 265 MG/DL (ref 70–99)
HBA1C MFR BLD: 10 % (ref 4–6)
HCT VFR BLD CALC: 30.8 % (ref 40.7–50.3)
HEMOGLOBIN: 9.1 G/DL (ref 13–17)
INR BLD: 1.2
MAGNESIUM: 1.9 MG/DL (ref 1.6–2.6)
MCH RBC QN AUTO: 29.7 PG (ref 25.2–33.5)
MCHC RBC AUTO-ENTMCNC: 29.5 G/DL (ref 28.4–34.8)
MCV RBC AUTO: 100.7 FL (ref 82.6–102.9)
MODE: ABNORMAL
NEGATIVE BASE EXCESS, ART: ABNORMAL (ref 0–2)
NRBC AUTOMATED: 0 PER 100 WBC
O2 DEVICE/FLOW/%: ABNORMAL
PATIENT TEMP: ABNORMAL
PDW BLD-RTO: 14.6 % (ref 11.8–14.4)
PLATELET # BLD: 392 K/UL (ref 138–453)
PLATELET # BLD: 417 K/UL (ref 138–453)
PMV BLD AUTO: 12 FL (ref 8.1–13.5)
POC HCO3: 28 MMOL/L (ref 21–28)
POC O2 SATURATION: 95 % (ref 94–98)
POC PCO2 TEMP: ABNORMAL MM HG
POC PCO2: 36.1 MM HG (ref 35–48)
POC PH TEMP: ABNORMAL
POC PH: 7.5 (ref 7.35–7.45)
POC PO2 TEMP: ABNORMAL MM HG
POC PO2: 69.4 MM HG (ref 83–108)
POSITIVE BASE EXCESS, ART: 4 (ref 0–3)
POTASSIUM SERPL-SCNC: 3.2 MMOL/L (ref 3.7–5.3)
PROTHROMBIN TIME: 12.3 SEC (ref 9–12)
RBC # BLD: 3.06 M/UL (ref 4.21–5.77)
SAMPLE SITE: ABNORMAL
SODIUM BLD-SCNC: 150 MMOL/L (ref 135–144)
TCO2 (CALC), ART: 29 MMOL/L (ref 22–29)
WBC # BLD: 26.6 K/UL (ref 3.5–11.3)

## 2019-04-18 PROCEDURE — 94770 HC ETCO2 MONITOR DAILY: CPT

## 2019-04-18 PROCEDURE — 83735 ASSAY OF MAGNESIUM: CPT

## 2019-04-18 PROCEDURE — 2100000001 HC CVICU R&B

## 2019-04-18 PROCEDURE — 6360000002 HC RX W HCPCS: Performed by: INTERNAL MEDICINE

## 2019-04-18 PROCEDURE — 99232 SBSQ HOSP IP/OBS MODERATE 35: CPT | Performed by: INTERNAL MEDICINE

## 2019-04-18 PROCEDURE — 70498 CT ANGIOGRAPHY NECK: CPT

## 2019-04-18 PROCEDURE — 2580000003 HC RX 258: Performed by: NURSE PRACTITIONER

## 2019-04-18 PROCEDURE — 6360000002 HC RX W HCPCS: Performed by: FAMILY MEDICINE

## 2019-04-18 PROCEDURE — 85049 AUTOMATED PLATELET COUNT: CPT

## 2019-04-18 PROCEDURE — 99233 SBSQ HOSP IP/OBS HIGH 50: CPT | Performed by: INTERNAL MEDICINE

## 2019-04-18 PROCEDURE — 82947 ASSAY GLUCOSE BLOOD QUANT: CPT

## 2019-04-18 PROCEDURE — 99291 CRITICAL CARE FIRST HOUR: CPT | Performed by: INTERNAL MEDICINE

## 2019-04-18 PROCEDURE — 94003 VENT MGMT INPAT SUBQ DAY: CPT

## 2019-04-18 PROCEDURE — 6370000000 HC RX 637 (ALT 250 FOR IP): Performed by: STUDENT IN AN ORGANIZED HEALTH CARE EDUCATION/TRAINING PROGRAM

## 2019-04-18 PROCEDURE — 6360000002 HC RX W HCPCS: Performed by: THORACIC SURGERY (CARDIOTHORACIC VASCULAR SURGERY)

## 2019-04-18 PROCEDURE — 70496 CT ANGIOGRAPHY HEAD: CPT

## 2019-04-18 PROCEDURE — 99232 SBSQ HOSP IP/OBS MODERATE 35: CPT | Performed by: NURSE PRACTITIONER

## 2019-04-18 PROCEDURE — 94762 N-INVAS EAR/PLS OXIMTRY CONT: CPT

## 2019-04-18 PROCEDURE — 85610 PROTHROMBIN TIME: CPT

## 2019-04-18 PROCEDURE — 82803 BLOOD GASES ANY COMBINATION: CPT

## 2019-04-18 PROCEDURE — 6360000004 HC RX CONTRAST MEDICATION: Performed by: PSYCHIATRY & NEUROLOGY

## 2019-04-18 PROCEDURE — 6370000000 HC RX 637 (ALT 250 FOR IP): Performed by: NURSE PRACTITIONER

## 2019-04-18 PROCEDURE — 2700000000 HC OXYGEN THERAPY PER DAY

## 2019-04-18 PROCEDURE — 36415 COLL VENOUS BLD VENIPUNCTURE: CPT

## 2019-04-18 PROCEDURE — 36600 WITHDRAWAL OF ARTERIAL BLOOD: CPT

## 2019-04-18 PROCEDURE — 6370000000 HC RX 637 (ALT 250 FOR IP): Performed by: INTERNAL MEDICINE

## 2019-04-18 PROCEDURE — 80048 BASIC METABOLIC PNL TOTAL CA: CPT

## 2019-04-18 PROCEDURE — 94640 AIRWAY INHALATION TREATMENT: CPT

## 2019-04-18 PROCEDURE — 85027 COMPLETE CBC AUTOMATED: CPT

## 2019-04-18 PROCEDURE — 6370000000 HC RX 637 (ALT 250 FOR IP): Performed by: FAMILY MEDICINE

## 2019-04-18 PROCEDURE — 6370000000 HC RX 637 (ALT 250 FOR IP): Performed by: PHYSICIAN ASSISTANT

## 2019-04-18 PROCEDURE — 6360000002 HC RX W HCPCS: Performed by: NURSE PRACTITIONER

## 2019-04-18 RX ORDER — FUROSEMIDE 10 MG/ML
40 INJECTION INTRAMUSCULAR; INTRAVENOUS 2 TIMES DAILY
Status: DISCONTINUED | OUTPATIENT
Start: 2019-04-18 | End: 2019-04-22

## 2019-04-18 RX ADMIN — Medication 10 ML: at 09:02

## 2019-04-18 RX ADMIN — INSULIN LISPRO 4 UNITS: 100 INJECTION, SOLUTION INTRAVENOUS; SUBCUTANEOUS at 09:40

## 2019-04-18 RX ADMIN — ACETAMINOPHEN 650 MG: 325 TABLET ORAL at 06:28

## 2019-04-18 RX ADMIN — FUROSEMIDE 20 MG: 10 INJECTION, SOLUTION INTRAMUSCULAR; INTRAVENOUS at 09:01

## 2019-04-18 RX ADMIN — Medication 500000 UNITS: at 19:51

## 2019-04-18 RX ADMIN — Medication 100 MG: at 08:59

## 2019-04-18 RX ADMIN — MULTIPLE VITAMINS W/ MINERALS TAB 1 TABLET: TAB at 08:59

## 2019-04-18 RX ADMIN — DOXYCYCLINE HYCLATE 100 MG: 100 TABLET, COATED ORAL at 19:51

## 2019-04-18 RX ADMIN — FAMOTIDINE 20 MG: 20 TABLET, FILM COATED ORAL at 19:51

## 2019-04-18 RX ADMIN — DOXYCYCLINE HYCLATE 100 MG: 100 TABLET, COATED ORAL at 09:00

## 2019-04-18 RX ADMIN — INSULIN LISPRO 4 UNITS: 100 INJECTION, SOLUTION INTRAVENOUS; SUBCUTANEOUS at 04:02

## 2019-04-18 RX ADMIN — Medication 500000 UNITS: at 09:00

## 2019-04-18 RX ADMIN — METOPROLOL TARTRATE 25 MG: 25 TABLET ORAL at 09:00

## 2019-04-18 RX ADMIN — INSULIN LISPRO 2 UNITS: 100 INJECTION, SOLUTION INTRAVENOUS; SUBCUTANEOUS at 22:57

## 2019-04-18 RX ADMIN — Medication 10 ML: at 19:50

## 2019-04-18 RX ADMIN — ALBUTEROL SULFATE 2.5 MG: 2.5 SOLUTION RESPIRATORY (INHALATION) at 13:04

## 2019-04-18 RX ADMIN — FAMOTIDINE 20 MG: 20 TABLET, FILM COATED ORAL at 08:59

## 2019-04-18 RX ADMIN — OXYCODONE HYDROCHLORIDE AND ACETAMINOPHEN 2 TABLET: 5; 325 TABLET ORAL at 19:54

## 2019-04-18 RX ADMIN — DESMOPRESSIN ACETATE 40 MG: 0.2 TABLET ORAL at 19:51

## 2019-04-18 RX ADMIN — POTASSIUM CHLORIDE 20 MEQ: 1.5 POWDER, FOR SOLUTION ORAL at 19:51

## 2019-04-18 RX ADMIN — Medication 500000 UNITS: at 16:47

## 2019-04-18 RX ADMIN — PROPOFOL 15 MCG/KG/MIN: 10 INJECTION, EMULSION INTRAVENOUS at 11:39

## 2019-04-18 RX ADMIN — ALBUTEROL SULFATE 2.5 MG: 2.5 SOLUTION RESPIRATORY (INHALATION) at 03:07

## 2019-04-18 RX ADMIN — OXYCODONE HYDROCHLORIDE AND ACETAMINOPHEN 2 TABLET: 5; 325 TABLET ORAL at 08:58

## 2019-04-18 RX ADMIN — POLYETHYLENE GLYCOL 3350 17 G: 17 POWDER, FOR SOLUTION ORAL at 09:01

## 2019-04-18 RX ADMIN — ENOXAPARIN SODIUM 40 MG: 40 INJECTION SUBCUTANEOUS at 09:01

## 2019-04-18 RX ADMIN — CLOPIDOGREL 75 MG: 75 TABLET, FILM COATED ORAL at 08:59

## 2019-04-18 RX ADMIN — INSULIN LISPRO 6 UNITS: 100 INJECTION, SOLUTION INTRAVENOUS; SUBCUTANEOUS at 13:50

## 2019-04-18 RX ADMIN — Medication 15 ML: at 19:51

## 2019-04-18 RX ADMIN — INSULIN GLARGINE 20 UNITS: 100 INJECTION, SOLUTION SUBCUTANEOUS at 09:02

## 2019-04-18 RX ADMIN — ALBUTEROL SULFATE 2.5 MG: 2.5 SOLUTION RESPIRATORY (INHALATION) at 08:17

## 2019-04-18 RX ADMIN — Medication 15 ML: at 09:01

## 2019-04-18 RX ADMIN — Medication 500000 UNITS: at 13:50

## 2019-04-18 RX ADMIN — IOVERSOL 90 ML: 741 INJECTION INTRA-ARTERIAL; INTRAVENOUS at 17:49

## 2019-04-18 RX ADMIN — FUROSEMIDE 40 MG: 10 INJECTION, SOLUTION INTRAMUSCULAR; INTRAVENOUS at 16:46

## 2019-04-18 RX ADMIN — ALBUTEROL SULFATE 2.5 MG: 2.5 SOLUTION RESPIRATORY (INHALATION) at 20:13

## 2019-04-18 RX ADMIN — POTASSIUM CHLORIDE 20 MEQ: 1.5 POWDER, FOR SOLUTION ORAL at 09:00

## 2019-04-18 RX ADMIN — ASPIRIN 81 MG 81 MG: 81 TABLET ORAL at 08:59

## 2019-04-18 ASSESSMENT — PULMONARY FUNCTION TESTS
PIF_VALUE: 24
PIF_VALUE: 22
PIF_VALUE: 22
PIF_VALUE: 11
PIF_VALUE: 27
PIF_VALUE: 25
PIF_VALUE: 23
PIF_VALUE: 11
PIF_VALUE: 52
PIF_VALUE: 21
PIF_VALUE: 11

## 2019-04-18 ASSESSMENT — PAIN SCALES - GENERAL: PAINLEVEL_OUTOF10: 5

## 2019-04-18 NOTE — PROGRESS NOTES
Labs     04/16/19  1123 04/17/19  0438 04/18/19  0416   WBC 28.6* 30.0* 26.6*   HGB 10.1* 9.8* 9.1*   HCT 31.2* 32.7* 30.8*   MCV 94.8 101.2 100.7    351 417     BMP:   Recent Labs     04/16/19  1123 04/17/19  0438 04/18/19  0416    149* 150*   K 4.2 3.6* 3.2*   * 114* 114*   CO2 22 24 25   BUN 25* 22* 20   CREATININE 0.52* 0.49* 0.46*     PT/INR:   Recent Labs     04/16/19  1123 04/17/19  0438 04/18/19  0416   PROTIME 12.6* 12.5* 12.3*   INR 1.2 1.2 1.2     APTT: No results for input(s): APTT in the last 72 hours. Chest X-Ray: 4/17/19:   ET tube in good position.  Enteric tube passes beneath the diaphragm.  Intact   sternotomy wires.  Cardiac leads overlie the chest.  Enlarged heart with   moderate pulmonary edema.  Bilateral pleural effusions.  No pneumothorax.           Impression   Stable moderate pulmonary edema and effusions.       Stable supporting devices. I/O:  I/O last 3 completed shifts: In: 1908 [I.V.:561; NG/GT:1347]  Out: 1336 [Urine:1350]      Assessment & Plan:   Patient Active Problem List   Diagnosis    Cardiac arrest ([facility] [address]    Acute ST elevation myocardial infarction (STEMI) of anterior wall (HCC)    Severe left ventricular systolic dysfunction    Acute respiratory failure with hypoxemia (HCC)    Cardiogenic pulmonary edema (HCC)    PAD (peripheral artery disease) (HCC)    Subacute osteomyelitis of right tibia ([address]    Exposed orthopaedic hardware ([address]    Metabolic encephalopathy    Abnormal CT of the head    Fever chills    Hepatitis    Acute cerebral infarction associated with systemic hypoxia or ischemia    Multi infarct state     1.  S/p CABG Emergent              POD 11              Neuro wants -180              HR now better controlled with lopressor              Lasix /K increased today               Tolerating tube feeds, BS +              Neuro - making improvements daily (light sedation with propofol due to appearing anxious on vent) Ortho following for right knee infection              ID also following, on teflaro IV              Family on board with Trach and PEG if needed. Will plan to wean vent, no extubation yet              Blood sugars still high, manage per IM              Free water flushes for hypernatremia              ABLA - Hgb stable    CTA head and neck per endovascular surgery  DVT ppx: Lovenox & SCD's while stationary  Patient is on BB, ASA, Statin therapy per protocol   Plan for discharge LTAC most likely        The above recommendations including medications and orders were discussed and agreed upon with Dr. Colleen Tapia, the attending on service for the cardiothoracic surgery group today.      Claire Lewis, APRN, CNP

## 2019-04-18 NOTE — PROGRESS NOTES
PALLIATIVE CARE TEAM    Patient: Juanito Garcia  Room: 1012/1012-01    Reason For Consult   Goals of care evaluation  Distress management  Symptom Management  Guidance and support  Facilitate communications  Assistance in coordinating care  Recommendations for the above    Impression: Juanito Garcia is a 46y.o. year old male with  has a past medical history of CAD (coronary artery disease), Cardiac arrest ([facility] [address] Osteolysis, PEA (Pulseless electrical activity) ([facility] [address] and PVD (peripheral vascular disease) ([facility] [address] .  Currently hospitalized for the management of cardiac arrest.  The Palliative Care Team is following to assist with support. Code Status  Full Code    Vital Signs:  BP (!) 153/64   Pulse 92   Temp 99.5 °F (37.5 °C) (Axillary)   Resp 19   Ht 5' 8\" (1.727 m)   Wt 172 lb 2.9 oz (78.1 kg)   SpO2 100%   BMI 26.18 kg/m²     Patient Active Problem List   Diagnosis    Cardiac arrest ([facility] [address]    Acute ST elevation myocardial infarction (STEMI) of anterior wall (HCC)    Severe left ventricular systolic dysfunction    Acute respiratory failure with hypoxemia (HCC)    Cardiogenic pulmonary edema (HCC)    PAD (peripheral artery disease) ([facility] [address]    Subacute osteomyelitis of right tibia ([facility] [address]    Exposed orthopaedic hardware ([facility] [address]    Metabolic encephalopathy    Abnormal CT of the head    Fever chills    Hepatitis    Acute cerebral infarction associated with systemic hypoxia or ischemia    Multi infarct state       Palliative Interaction:  Writer met today with patient's father Nikhil Willard, and step-mother Cecelia. They state that the patient is doing much better and was able to wean from the ventilator for 1 1/2 hours earlier today. They are thankful and hopeful that the patient will not have to have a trach/PEG. However, if the patient would need a trach and PEG, they state that the patient's son, Nyasia Alexandra, with their support, remains with his decision that he would want the patient to have the procedure.   Emotional support provided.       Goals/Plan of care  Education/support to family  Providing support for coping/adaptation/distress of family    Electronically signed by   James Beverly RN  Palliative Care Team  on 4/18/2019 at 3:14 PM

## 2019-04-18 NOTE — PROGRESS NOTES
04/18/19 1808   Patient Transport   Time spent transporting 31-45   Transport ventillation type Transport vent   Transport from ICU  (CAR 1)   Transport destination CT scan   Emergency equipment included Yes       The transport originated from [address], room 1012. Pt. was transported to CT-scan. Assisting with the transport was RN. Appropriate devices were applied to monitor the patient's condition during transport. Patient transported  via 40% O2 via ventilator. Patient tolerated well.         Malena Voss  6:21 PM

## 2019-04-18 NOTE — PROGRESS NOTES
04/18/19 0824 04/18/19 1005   Vent Information   Vent Mode CPAP PRVC   Rate Set  --  14 bmp   Pressure Support 6 cmH20  --    FiO2   --  40 %   PEEP/CPAP  --  5   Vent Patient Data   Rate Measured 30.9 br/min 39 br/min   Vt Exhaled 324 mL 574 mL   Spontaneous Breathing Trial (SBT) RT Doc   Pulse 95 118   SpO2 100 % 95 %   Additional Respiratory  Assessments   End Tidal CO2 33 (%) 32 (%)   post 1 hour 40 min.  Pt placed back on PRVC due to RR 40s, RN in room and notifed

## 2019-04-18 NOTE — PROGRESS NOTES
04/17/19 0817 04/17/19 0848   Vent Information   Vent Mode CPAP PRVC  (RR 40, placed back on PRVC post 30 min on wean)   Rate Set  --  14 bmp   Pressure Support 6 cmH20  --    Vent Patient Data   Rate Measured 18.4 br/min 32.3 br/min   Vt Exhaled 394 mL 803 mL   Spontaneous Breathing Trial (SBT) RT Doc   Pulse 107 123   SpO2 97 % 95 %   Additional Respiratory  Assessments   End Tidal CO2 34 (%) 33 (%)   pt 30 min.  Pt place back on PRCV per request by RN, due to RR 35-40

## 2019-04-18 NOTE — PROGRESS NOTES
[facility]    Progress Note    4/18/2019    9:22 AM    Name:   Joaquín Shabazz  MRN:     [id number]     Acct:      [de-identified]   Room:   [address] [place] [address] Day:  6  Admit Date:  4/7/2019  4:48 AM    PCP:   No primary care provider on file. Code Status:  Full Code    Subjective:     C/C:   Chief Complaint   Patient presents with    Cardiac Arrest     diff breather, brought in on cpap with elevations in EKG on EMS arrival.      Interval History Status: not changed  Much more awake today, also seems consistently following motor commands, able to move left arm and leg well, right leg seems difficulty moving up  No fevers, chills reported overnight  Also stable vitals with BP better controlled today     Brief History:     The patient is a 46 y.o. male who is admitted for  cardiac arrest and STEMI. Patient was admitted on 4/7/19 after he was brought in by EMS for dyspnea, acute respiratory failure. Patient was placed on CPAP and nebulization treatment. Initial EKG showed possible anterior septal STEMI. On arrival to [address] [place] [address] emergency room patient had PEA arrest and was resuscitated per ACS protocol with 3 rounds of CPR and epinephrine with successful ROSC. Patient underwent emergent cardiac cath and showed multivessel CAD with severe LV dysfunction with EF 15% and severe hypokinesis. Patient had emergent CABG x 4 on 4/7/19 with closure of chest wall on 4/8/19. [facility] Patient developed acute kidney injury post-CAB with creatinine peaking at 1.99 which improved after fluid resuscitation and pressor support. Patient was taken off pressor support. Patient was asked. On 4/10/19. Patient had altered mental status upon weaning from  sedatives . CT head showed scattered areas of encephalomalacia in bifrontal and left parietal areas consistent with remote infarct and possible subacute infarct in left lower parietal region.   Patient has underlying history of disease), Cardiac arrest ([facility] Osteolysis, PEA (Pulseless electrical activity) ([facility] and [facility]peripheral vascular disease) [facility] Social History:        Family History: History reviewed. No pertinent family history. Vitals:  BP (!) 153/64   Pulse 95   Temp 99.9 °F (37.7 °C) (Oral)   Resp (!) 33   Ht 5' 8\" (1.727 m)   Wt 172 lb 2.9 oz (78.1 kg)   SpO2 100%   BMI 26.18 kg/m²   Temp (24hrs), Av.4 °F (37.4 °C), Min:97.7 °F (36.5 °C), Max:101.6 °F (38.7 °C)    Recent Labs     19  1552 19  2052 19  0359 19  0700   POCGLU 226* 244* 234* 221*     I/O (24Hr):     Intake/Output Summary (Last 24 hours) at 2019 6825  Last data filed at 2019 0540  Gross per 24 hour   Intake 1908 ml   Output 1350 ml   Net 558 ml     Labs:    Lab Results   Component Value Date/Time    SPECIAL NOT REPORTED 2019 07:55 AM     Lab Results   Component Value Date/Time    CULTURE NO GROWTH 2019 07:55 AM     Lab Results   Component Value Date    POCPH 7.498 2019    POCPCO2 36.1 2019    POCPO2 69.4 2019    POCHCO3 28.0 2019    NBEA NOT REPORTED 2019    PBEA 4 2019    KZR6LFZ 29 2019    FJJZ8MBF 95 2019    FIO2 40.0 2019     Physical Examination:        General appearance: wide awake with open eyes and nodding head to conversations, seems appropriate most of the time  Mental Status: following motor commands today  Lungs:  clear to auscultation bilaterally, normal effort  Heart:  regular rate and rhythm, no murmur  Abdomen:  soft, nontender, nondistended, normal bowel sounds  Extremities:  no edema, redness, tenderness in the calves  Skin:  no gross lesions, rashes, induration    Assessment:        Primary Problem  Cardiac arrest [facility])    Active Hospital Problems    Diagnosis Date Noted    Acute cerebral infarction associated with systemic hypoxia or ischemia [I63.89]     Multi infarct state [I69.30]     Fever chills [R50.9]     Hepatitis [V56.8]     Metabolic encephalopathy [Y25.24]     Abnormal CT of the head [R93.0]     [facility] ([facility] [I46.9] 04/07/2019    Severe left ventricular systolic dysfunction [F26.7] 04/07/2019    Acute respiratory failure with hypoxemia ([facility]) [J96.01] 04/07/2019    Cardiogenic pulmonary edema ([facility] [I50.1] 04/07/2019    PAD (peripheral artery disease) ([facility]) [I73.9] 04/07/2019    Acute ST elevation myocardial infarction (STEMI) of anterior wall ([facility]) [I21.09]     Subacute osteomyelitis of right tibia [facility]) [E80.249]     Exposed orthopaedic hardware ([facility] [H75.419N]      Plan:        - Cardiac arrest: PEA needed 3 rounds of chest compressions  - Multivessel CAD, status post CABG now  - Encephalopathy, significant, EEG with no definite seizures. MRI with multiple small strokes in cerebral and cerebellar areas. Improved today, much more awake, not following commands consistently but was moving all extremities including right arm today   - CVA, multiple, small, bilateral cerebral area and left cerebellum area. NO regional large infarcts, continued close follow up of clinical status, improving overall  - Carotid stenosis, bilateral seems worse on left side. Likely CT angiogram this admission  - Hypertension, on drips, likely worse drom agitattion  - Acute respiratory failure  - Diabetes, poorly controlled.  lantus continued along with sliding scale now  - Fevers with underlying chronic osteomyelitis, cefatorline per ID team. Worsening leukocytosis, repeat X ray today, if continued infiltration might need to add gram negative coverage in setting of worsened leukocytosis          Houston Quiroz MD  4/18/2019  9:22 AM

## 2019-04-18 NOTE — PROGRESS NOTES
[facility]   Progress Note                   Date:   4/18/2019  Patient name: Damon Frank  Date of admission:  4/7/2019  4:48 AM  MRN:   [id number]  YOB: 1965  PCP: No primary care provider on file. Reason for Admission: STEMI    Subjective:       S/P emergent CABG, POD#9    PEA Arrest, late presentation of STEMI with emergent bypass. Post op course complicated by neurological insult with multiple areas of ischemic stroke        Pt is doing better. Following commands. Still intubated and sedated   Lopressor was not given yesterday      I/O last 3 completed shifts: In: 1908 [I.V.:561; NG/GT:1347]  Out: 6991 [Urine:1350]  No intake/output data recorded.       Medications:   Scheduled Meds:   metoprolol tartrate  25 mg Per NG tube BID    furosemide  20 mg Intravenous BID    potassium chloride  20 mEq Oral BID    doxycycline hyclate  100 mg Oral 2 times per day    insulin glargine  20 Units Subcutaneous Daily    insulin lispro  0-12 Units Subcutaneous Q6H    famotidine  20 mg Oral BID    enoxaparin  40 mg Subcutaneous Daily    nystatin  5 mL Oral 4x Daily    aspirin  81 mg Oral Daily    docusate  100 mg Oral Daily    dextrose  12.5 g Intravenous Once    albuterol  2.5 mg Nebulization Q6H    sodium chloride flush  10 mL Intravenous 2 times per day    sodium chloride flush  10 mL Intravenous 2 times per day    polyethylene glycol  17 g Oral Daily    chlorhexidine  15 mL Mouth/Throat BID    therapeutic multivitamin-minerals  1 tablet Oral Daily with breakfast    atorvastatin  40 mg Oral Nightly    clopidogrel  75 mg Oral Daily       Continuous Infusions:   niCARdipine      propofol 10 mcg/kg/min (04/17/19 2000)    sodium chloride 20 mL/hr (04/14/19 1141)    dextrose         CBC:   Recent Labs     04/16/19 1123 04/17/19 0438 04/18/19 0416   WBC 28.6* 30.0* 26.6*   HGB 10.1* 9.8* 9.1*    351 417     BMP:    Recent Labs     04/16/19  1123 04/17/19 0438 04/18/19  0416    149* 150*   K 4.2 3.6* 3.2*   * 114* 114*   CO2 22 24 25   BUN 25* 22* 20   CREATININE 0.52* 0.49* 0.46*   GLUCOSE 324* 278* 265*     Hepatic:   Recent Labs     04/16/19  1123   *   *   BILITOT 1.30*   ALKPHOS 197*     Troponin: No results for input(s): TROPONINI in the last 72 hours. BNP: No results for input(s): BNP in the last 72 hours. Lipids:   Recent Labs     04/17/19  0438   CHOL 56   HDL 13*     INR:   Recent Labs     04/16/19  1123 04/17/19  0438 04/18/19  0416   INR 1.2 1.2 1.2       Objective:   Vitals: BP (!) 153/64   Pulse 95   Temp 99.9 °F (37.7 °C) (Oral)   Resp (!) 33   Ht 5' 8\" (1.727 m)   Wt 172 lb 2.9 oz (78.1 kg)   SpO2 100%   BMI 26.18 kg/m²   General appearance: intubated,follows commands  HEENT: Head: Normocephalic, no lesions,   Bilateral bruist   Neck: no JVD  Lungs:  Mechanical ventilator, rales present  Heart: regular rate and rhythm, S1, S2 normal, no murmur, click, rub or gallop  Abdomen: soft, non-tender; bowel sounds normal  Extremities: No LE edema        EKG: Sinus tachycardia with anteroseptal infarct      Coronary Angiography: 4/7/19  LMCA: has distal 60% stenosis. LAD: has ostial 90% stenosis and long proximal 80% stenosis. D1 has proximal 70% stenosis. LCx: has 20% stenosis. OM1 is very small. OM2 has proximal 90% stenosis. RCA: has proximal 50% stenosis, mid 60% stenosis and distal 80% stenosis. Ramus: has ostial 99% stenosis.     The LV gram was performed in the DUTTON 30 position. LVEF: 15%. LV Wall Motion: severe hypokinesis of the anterior wall and mid inferior wall with apical akinesis. Echo 4/12/19:  Left ventricle is normal in size. Global left ventricular systolic function is mildly reduced. Calculated ejection fraction is 40. Left ventricular wall thickness is mildly increased. Grade II diastolic dysfunction  Normal valvular function      Assessment:   1.  PEA Arrest (Multiple times) with ROSC in few minutes - off sedation and paralytics  2. Late presentation of anteroseptal STEMI   3. Multivessel CAD s/p emergent CABG on 4/7 with LIMA-LAD, SVG-PDA,OM 1 & distal circumflex). 4. S/P sternal closure on 4/8  5. Ischemic cardiomyopathy with acute systolic CHF (EF 57% on ANGELA and TTE)  6. NSVT  7. Post op anemia stable  8. Post op thrombocytopenia resolved  9. Acute pulmonary edema s/p intubation  10. Sepsis   11. Acute renal failure - resolved  12. Metabolic encephalopathy resolving  13. Exposed orthopedic hardware S/P removal of Protruding orthopedic nail from Rt lateral knee area. Old blood and malodor. 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 14. S/P ORIF Rt proximal tibia. Non compliant with follow up. 15. Shock Liver with AST and ALT in 3000s (AST 3077, ALT 3818) resolving  16. Gallbladder sludge with concern for cholecystitis  17. Multiple embolic acute infarcts in brain( 4/14)  18. Left internal carotid artery occlusion      Treatment Plan:   1 will recommend continuing with lopressor 25 BID, since no strict SBP goal for now. Will hold for SBP<110. Lasix 20 IV BID for the patient. CTA neck for the evaluating the ICA occlusion per neuro team. Follow with results. Will recommend doing weaning trial for the patient  2 will continue with asa and plavix for the patient. 3 ceftaroline per ID. Still spiking temp to 101. Electronically signed on 04/18/19 at 9:09 AM by:    Cady Garcia MD   Fellow, [address]    Attending Physician Statement  I have discussed the care of the patient, including pertinent history and exam findings, with the resident. I have seen and examined the patient and the key elements of all parts of the encounter have been performed by me. I agree with the assessment, plan and orders as documented by the resident.     Nathalia Dolan MD

## 2019-04-18 NOTE — PLAN OF CARE
Problem: SKIN INTEGRITY  Goal: Skin integrity is maintained or improved  Outcome: Ongoing     Problem: MECHANICAL VENTILATION  Goal: Mobility/activity is maintained at optimum level for patient  Outcome: Ongoing     Problem: MECHANICAL VENTILATION  Goal: Ability to express needs and understand communication  Outcome: Ongoing     Problem: MECHANICAL VENTILATION  Goal: ET tube will be managed safely  Outcome: Ongoing     Problem: MECHANICAL VENTILATION  Goal: Oral health is maintained or improved  Outcome: Ongoing     Problem: MECHANICAL VENTILATION  Goal: Patient will maintain patent airway  Outcome: Ongoing     Problem: OXYGENATION/RESPIRATORY FUNCTION  Goal: Patient will maintain patent airway  Outcome: Ongoing     Problem: OXYGENATION/RESPIRATORY FUNCTION  Goal: Patient will achieve/maintain normal respiratory rate/effort  Description  Respiratory rate and effort will be within normal limits for the patient  Outcome: Ongoing

## 2019-04-18 NOTE — PROGRESS NOTES
NEUROLOGY INPATIENT PROGRESS NOTE    4/18/2019         Subjective: Wale Muse is a  46 y.o. male admitted on 4/7/2019. Chart reviewed. Discussed with RN. Patient examined. Patient remains on Propfol, has had some agitation today. RN reports he is moving all 4 limbs spontaneously, answering some yes/no questions correctly. CTA head and neck is pending. HPI: Briefly, this is a  46 y.o. male who was admitted on 4/7/2019 with cardiac arrest. Per the records the patient called EMS for worsening shortness of breath; on EMS arrival patient was found to have audible rales, EKG concerning for STEMI. When EMS was arriving to ER the patient lost pulses, compressions were started with an initial rhythm of PEA. Patient received continued CPR and epi x2, ROSC after 3 rounds of CPR. Patient was taken emergently to cath lab where they found multivessel disease and he arrested again, was able to be resuscitated. He was then transferred to the OR immediately for CABG, he arrested again on arrival to the OR and was placed on coronary bypass. The patient is s/p CABG x4 on 4/7. He had chest closure on 4/8. Per the notes, the patient was able to follow commands, had spontaneous movement as he was trying to pull at his tubes on 4/10. Sedation was increased at that time due to patient irritability, and was later weaned off. RN reported both Precedex and Versed drips have been off since 4/11 at 8am and since that time the patient has not responded to commands, did not had spontaneous movement, and so neurology was consulted. He has required intermittent sedation since that time due to unstable vitals. A CT head was done showing scattered areas of encephalomalacia to bifrontal and high left parietal areas consistent with remote infarct/trauma, area of possible subacute infarct to left lower parietal region.  MRI brain with multi infarct state and L ICA high grade stenosis vs occlusion; endovascular was consulted who recommended CTA head and neck and avoiding symptomatic hypotension. ECHO with EF 40%. No current facility-administered medications on file prior to encounter. No current outpatient medications on file prior to encounter. Allergies: Jennifer Murray has no allergies on file. Past Medical History:   Diagnosis Date    CAD (coronary artery disease) 04/07/2019    Cardiac arrest ([facility] 04/07/2019    Osteolysis 04/07/2019    R Knee hardware    PEA (Pulseless electrical activity) ([facility] 04/07/2019    [facility]ipheral [facility]       Past Surgical History:   Procedure Laterality Date    CARDIAC CATHETERIZATION  04/07/2019    MVD-Stv    CORONARY ARTERY BYPASS GRAFT  04/07/2019    x4 per Dr. Georgie Ferguson, [address] [place] [address] GRAFT N/A 4/7/2019    EMERGENT CPR,  CORONARY ARTERY BYPASS X4, ON PUMP ,CHEST LEFT OPEN; SWAN, ANGELA PER ANESTHESIA performed by Alpa Abreu MD at [address] [place] [address],[address]      unsure if correct known tibial surgery    HARDWARE REMOVAL Right 04/07/2019    R Knee internal screw protruding through skin    RECONSTRUCTIVE REPAIR STERNAL N/A 4/8/2019    STERNAL  WOUND WASHOUT AND CLOSURE S/P CABG performed by Alpa Abreu MD at [facility] Right     [facility] off roof, external fixation and internal hardware    TONSILLECTOMY         Social History: Jennifer Murray      History reviewed. No pertinent family history.     Objective:   BP (!) 153/64   Pulse 95   Temp 99.9 °F (37.7 °C) (Oral)   Resp (!) 33   Ht 5' 8\" (1.727 m)   Wt 172 lb 2.9 oz (78.1 kg)   SpO2 100%   BMI 26.18 kg/m²     Blood pressure range: Systolic (05WET), TIFFANIE:354 , Min:137 , ADP:256   ; Diastolic (23HGR), FIP:10, Min:54, Max:129      Review of Systems:  Cannot complete due to patient condition                                            Limited Neuro Exam:  Patient remains intubated and sedated on Propofol  Eyes are open, patient with visual fixation and proximal stenosis or occlusion. ECHO (4/7/19) -  LVEF: 15%. LV Wall Motion: severe hypokinesis of the anterior wall and mid inferior wall with apical akinesis.             REPEAT ECHO (4/12/19) -  Left ventricle is normal in size. Global left ventricular systolic function  is mildly reduced. Calculated ejection fraction is 40. Left ventricular wall thickness is mildly increased. Grade II diastolic  dysfunction  Normal valvular function      EEG (4/12/19) -  This EEG shows the presence of moderate diffuse slowing. This EEG is concordant with diffuse encephalopathy. No clear  lateralized or epileptiform disturbance is seen. Impression and Plan: Mr. Michelle Gallego is a 46 y.o. male with multifactorial metabolic encephalopathy s/p cardiac arrest x3 and CABG x4, in the setting of ALMITA, elevated liver enzymes, febrile illness, chronic osteomyelitis. Multi-infarct state with left ICA severe stenosis vs occlusion    Patient's neurologic exam is slowly improving; he is more alert, tracking, and spontaneously moving all 4 limbs (L>R)    CTA head and neck pending to follow up to L ICA severe stenosis vs occlusion; endovascular is following    Patient remains on ASA 81mg QD, Plavix 75mg QD, Lipitor 40mg QD    Avoid symptomatic hypotension     Carotid US official report pending    Likely plan for trach/PEG per notes    Palliative care is following     Will follow with you.

## 2019-04-18 NOTE — PROGRESS NOTES
INTENSIVE CARE UNIT  Resident Physician Progress Note    Patient - Millicent Bence  Date of Admission -  2019  4:48 AM  Date of Evaluation -  2019  Room and Bed Number -  1012/1012-01   Hospital Day - 10      SUBJECTIVE:     OVERNIGHT EVENTS:             Review of systems could not be performed because of the patient's mental and physical status    AWAKE & FOLLOWING COMMANDS:  [] No   [x] Yes follows a few commands    SECRETIONS Amount:  [x] Small [] Moderate  [] Large  [] None  Color:     [x] White [] Colored  [] Bloody    SEDATION:  RAAS Score:  [x] Propofol gtt  [] Versed gtt  [] Ativan gtt   [] No Sedation . patient also got dexmedetomidine.   PARALYZED:  [x] No    [] Yes    VASOPRESSORS:  [x] No    [] Yes  [] Levophed [] Dopamine [] Vasopressin  [] Dobutamine [] Phenylephrine [] Epinephrine      OBJECTIVE:     VITAL SIGNS:  BP (!) 160/113   Pulse 97   Temp 97.7 °F (36.5 °C) (Oral)   Resp 18   Ht 5' 8\" (1.727 m)   Wt 176 lb 2.4 oz (79.9 kg)   SpO2 98%   BMI 26.78 kg/m²   Tmax over 24 hours:  Temp (24hrs), Av °F (37.2 °C), Min:97.7 °F (36.5 °C), Max:101.6 °F (38.7 °C)      Patient Vitals for the past 8 hrs:   BP Temp Temp src Pulse Resp SpO2   19 1842 -- -- -- 97 18 98 %   19 1830 (!) 160/113 -- -- 96 18 98 %   19 1800 (!) 166/69 -- -- 95 14 98 %   19 1730 (!) 172/71 -- -- 102 22 100 %   19 1700 (!) 163/68 -- -- 94 17 99 %   19 1630 (!) 144/67 -- -- 93 17 96 %   19 1600 (!) 160/70 -- -- 96 20 97 %   19 1537 -- -- -- 96 17 99 %   19 1531 (!) 170/65 97.7 °F (36.5 °C) Oral 98 17 99 %   19 1500 (!) 161/129 -- -- 100 17 100 %   19 1430 (!) 165/66 -- -- 96 14 98 %   19 1400 (!) 151/105 -- -- 98 17 97 %   19 1330 (!) 158/70 -- -- 102 18 98 %   19 1300 (!) 161/66 -- -- 105 16 98 %   19 1253 -- 99.3 °F (37.4 °C) Oral -- -- --         Intake/Output Summary (Last 24 hours) at 2019  Last data filed at 2019 1718  Gross per 24 hour   Intake 1867 ml   Output 2190 ml   Net -323 ml     Date 04/17/19 0000 - 04/17/19 2359   Shift 5172-0853 3456-1628 2770-2115 24 Hour Total   INTAKE   I.V.(mL/kg) 203(2.5)  297(3.7) 500(6.3)   NG/GT(mL/kg) 597(7.5)  720(9) 1317(16.5)   Shift Total(mL/kg) 800(10)  1017(12.7) 1817(22.7)   OUTPUT   Urine(mL/kg/hr) 1240(1.9) 550(0.9) 400 2190   Shift Total(mL/kg) 1805(11.8) 550(6.9) 400(5) 1827(92.2)   Weight (kg) 79.9 79.9 79.9 79.9     Wt Readings from Last 3 Encounters:   04/17/19 176 lb 2.4 oz (79.9 kg)     Body mass index is 26.78 kg/m². PHYSICAL EXAM:  Constitutional: Appears well, no distress  EENT: PERRLA,  sclera clear, anicteric, oropharynx clear, no lesions, neck supple with midline trachea. Neck: Supple, symmetrical, trachea midline, no adenopathy, thyroid symmetric, no jvd skin normal  Respiratory: clear to auscultation, no wheezes or rales and unlabored breathing. No intercostal tenderness.   Had course breath sounds bilaterally  Cardiovascular: regular rate and rhythm, normal S1, S2, no murmur noted and 2+ pulses throughout  Abdomen: soft, nontender, nondistended, no masses or organomegaly  Extremities:  peripheral pulses normal, no pedal edema, no clubbing or cyanosis  MEDICATIONS:  Scheduled Meds:   metoprolol tartrate  25 mg Per NG tube BID    furosemide  20 mg Intravenous BID    potassium chloride  20 mEq Oral BID    doxycycline hyclate  100 mg Oral 2 times per day    insulin glargine  20 Units Subcutaneous Daily    insulin lispro  0-12 Units Subcutaneous Q6H    famotidine  20 mg Oral BID    enoxaparin  40 mg Subcutaneous Daily    nystatin  5 mL Oral 4x Daily    aspirin  81 mg Oral Daily    docusate  100 mg Oral Daily    dextrose  12.5 g Intravenous Once    albuterol  2.5 mg Nebulization Q6H    sodium chloride flush  10 mL Intravenous 2 times per day    sodium chloride flush  10 mL Intravenous 2 times per day    polyethylene glycol  17 g Oral Daily    chlorhexidine  15 mL Mouth/Throat BID    therapeutic multivitamin-minerals  1 tablet Oral Daily with breakfast    atorvastatin  40 mg Oral Nightly    clopidogrel  75 mg Oral Daily     Continuous Infusions:   niCARdipine      propofol 10 mcg/kg/min (04/17/19 1103)    sodium chloride 20 mL/hr (04/14/19 1141)    dextrose       PRN Meds:     fentanNYL 25 mcg Q1H PRN   sodium chloride flush 10 mL PRN   labetalol 10 mg Q6H PRN   naloxone 0.4 mg PRN   albuterol 2.5 mg Q6H PRN   sodium chloride flush 10 mL PRN   magnesium hydroxide 30 mL Daily PRN   sodium chloride flush 10 mL PRN   calcium chloride IVPB 1 g PRN   magnesium sulfate 1 g PRN   potassium chloride 20 mEq PRN   acetaminophen 650 mg Q4H PRN   acetaminophen 650 mg Q4H PRN   oxyCODONE-acetaminophen 1 tablet Q4H PRN   Or     oxyCODONE-acetaminophen 2 tablet Q4H PRN   diphenhydrAMINE 25 mg Nightly PRN   bisacodyl 10 mg Daily PRN   ondansetron 4 mg Q8H PRN   hydrALAZINE 5 mg Q5 Min PRN   albumin human 25 g PRN   glucose 15 g PRN   dextrose 12.5 g PRN   glucagon (rDNA) 1 mg PRN   dextrose 100 mL/hr PRN       SUPPORT DEVICES: [x] Ventilator [] BIPAP  [] Nasal Cannula [] Room Air    VENT SETTINGS (Comprehensive) (if applicable):  Vent Information  $Ventilation: $Subsequent Day  Ventilator Started: Yes  Skin Assessment: Clean, dry, & intact  Equipment Changed: Expiratory Filter  Vent Type: Servo i  Vent Mode: PRVC  Vt Ordered: 660 mL  Rate Set: 14 bmp  Pressure Support: (S) 6 cmH20  FiO2 : 40 %  Sensitivity: 5  PEEP/CPAP: 5  I Time/ I Time %: 0.85 s  Cuff Pressure (cm H2O): 25 cm H2O  Humidification Source: Heated wire  Humidification Temp: 34.1  Humidification Temp Measured: 33  Circuit Condensation: Not drained  Nitric Oxide/Epoprostenol In Use?: No  Additional Respiratory  Assessments  Pulse: 97  Resp: 18  SpO2: 98 %  End Tidal CO2: 33 (%)  Position: Semi-Quintana's  Humidification Source: Heated wire  Humidification Temp: 34.1  Circuit Condensation: Not drained  Oral Care Completed?: Yes  Oral Care: Mouthwash  Subglottic Suction Done?: Yes  Cuff Pressure (cm H2O): 25 cm H2O  Skin barrier applied: Yes    ABGs:     Lab Results   Component Value Date    DYD1SBE 29 04/17/2019    FIO2 40.0 04/17/2019     7.48/37/83/positive, base excess 4.     DATA:  Complete Blood Count:   Recent Labs     04/16/19  0504 04/16/19  1123 04/17/19  0438   WBC 25.1* 28.6* 30.0*   RBC 3.35* 3.29* 3.23*   HGB 10.0* 10.1* 9.8*   HCT 31.7* 31.2* 32.7*   MCV 94.6 94.8 101.2   MCH 29.9 30.7 30.3   MCHC 31.5 32.4 30.0   RDW 15.1* 14.9* 15.0*    348 351   MPV 12.4 12.1 12.0        Last 3 Blood Glucose:   Recent Labs     04/15/19  0516 04/16/19  0504 04/16/19  1123 04/17/19  0438   GLUCOSE 255* 297* 324* 278*        PT/INR:    Lab Results   Component Value Date    PROTIME 12.5 04/17/2019    INR 1.2 04/17/2019     PTT:    Lab Results   Component Value Date    APTT 25.7 04/07/2019       Comprehensive Metabolic Profile:   Recent Labs     04/16/19  0504 04/16/19  1123 04/17/19  0438   * 144 149*   K 4.3 4.2 3.6*   * 110* 114*   CO2 19* 22 24   BUN 26* 25* 22*   CREATININE 0.48* 0.52* 0.49*   GLUCOSE 297* 324* 278*   CALCIUM 8.9 8.7 8.8   PROT  --  6.3*  --    LABALBU  --  2.7*  --    BILITOT  --  1.30*  --    ALKPHOS  --  197*  --    AST  --  246*  --    ALT  --  795*  --       Magnesium:   Lab Results   Component Value Date    MG 2.1 04/17/2019    MG 1.9 04/16/2019    MG 2.0 04/16/2019     Phosphorus: No results found for: PHOS  Ionized Calcium:   Lab Results   Component Value Date    CAION 0.97 04/09/2019    CAION 1.01 04/07/2019        Urinalysis:   Lab Results   Component Value Date    NITRU NEGATIVE 04/14/2019    COLORU DARK YELLOW 04/14/2019    PHUR 5.0 04/14/2019    WBCUA 0 TO 2 04/14/2019    RBCUA 2 TO 5 04/14/2019    MUCUS NOT REPORTED 04/14/2019    TRICHOMONAS NOT REPORTED 04/14/2019    YEAST NOT REPORTED 04/14/2019    BACTERIA NOT REPORTED 04/14/2019    SPECGRAV 1.024 04/14/2019    LEUKOCYTESUR NEGATIVE 04/14/2019    UROBILINOGEN Normal 04/14/2019    BILIRUBINUR NEGATIVE 04/14/2019    GLUCOSEU 3+ 04/14/2019    KETUA NEGATIVE 04/14/2019    AMORPHOUS NOT REPORTED 04/14/2019       HgBA1c:    Lab Results   Component Value Date    LABA1C 10.8 04/07/2019     TSH:  No results found for: TSH    Lactic Acid: No results found for: LACTA   Troponin: No results for input(s): TROPONINI in the last 72 hours. Microbiology:  Chest swab with no growth so far. Other Labs:      Radiology/Imaging:  Chest x-ray with cardiomegaly and bilateral pulmonary vascular congestion and effusions. ASSESSMENT:     Principal Problem:    Cardiac arrest ([address]  Active Problems:    Acute ST elevation myocardial infarction (STEMI) of anterior wall (HCC)    Severe left ventricular systolic dysfunction    Acute respiratory failure with hypoxemia (HCC)    Cardiogenic pulmonary edema (HCC)    PAD (peripheral artery disease) (HCC)    Subacute osteomyelitis of right tibia (HCC)    Exposed orthopaedic hardware ([address]    Metabolic encephalopathy    Abnormal CT of the head    Fever chills    Hepatitis    Acute cerebral infarction associated with systemic hypoxia or ischemia    Multi infarct state  Resolved Problems:    * No resolved hospital problems. *  Normal anion gap metabolic acidosis. Acute kidney injury, resolved. CVA. Ischemic hepatitis. Toxic metabolic encephalopathy, slightly improved  Leukocytosis, slightly worse. Hyperglycemia secondary to stress. Anemia, unchanged  Thrombocytopenia, resolved  Amiodarone use. Acute respiratory and metabolic alkalosis. Hypernatremia. Acute systolic congestive heart failure      PLAN:     WEAN PER PROTOCOL:  [x] No   [] Yes  [] N/A    DISCONTINUE ANY LABS:   [] No   [] Yes    ICU PROPHYLAXIS:  Stress ulcer:  [] PPI Agent  [x] [id number] [] Sucralfate  [] Other:  VTE:   [x] Enoxaparin  [] Unfract.  Heparin Subcut  [] EPC Cuffs    NUTRITION:  [] NPO [x] Tube Feeding  [] TPN  [] PO    HOME MEDICATIONS RECONCILED: [] No  [] Yes    INSULIN DRIP:   [x] No   [] Yes    CONSULTATION NEEDED:  [] No   [] Yes    FAMILY UPDATED:    [] No   [] Yes    TRANSFER OUT OF ICU:   [x] No   [] Yes    ADDITIONAL PLAN:  - Continue efforts to wean the patient from the ventilator  -Patient is on Lasix  -On bronchodilators. Patient has a minute ventilation of 12 L with a PEEP of 5 with an FiO2 of 0.40. Total critical care time caring for this patient with life threatening, unstable organ failure, including direct patient contact, management of life support systems, review of data including imaging and labs, discussions with other team members and physicians at least 27  Min so far today, excluding procedures. Please note that this chart was generated using voice recognition dictation software. Although every effort was made to ensure the accuracy of this automated transcription, some errors in transcription may have occurred.          Darylene Bumps, MD  4/17/2019 8:33 PM

## 2019-04-19 ENCOUNTER — APPOINTMENT (OUTPATIENT)
Dept: GENERAL RADIOLOGY | Age: 54
DRG: 165 | End: 2019-04-19
Payer: MEDICAID

## 2019-04-19 LAB
-: NORMAL
ALBUMIN SERPL-MCNC: 2.6 G/DL (ref 3.5–5.2)
ALBUMIN/GLOBULIN RATIO: 0.7 (ref 1–2.5)
ALLEN TEST: POSITIVE
ALP BLD-CCNC: 206 U/L (ref 40–129)
ALT SERPL-CCNC: 388 U/L (ref 5–41)
ANION GAP SERPL CALCULATED.3IONS-SCNC: 9 MMOL/L (ref 9–17)
AST SERPL-CCNC: 110 U/L
BILIRUB SERPL-MCNC: 1.01 MG/DL (ref 0.3–1.2)
BILIRUBIN DIRECT: 0.55 MG/DL
BILIRUBIN, INDIRECT: 0.46 MG/DL (ref 0–1)
BUN BLDV-MCNC: 24 MG/DL (ref 6–20)
BUN/CREAT BLD: ABNORMAL (ref 9–20)
CALCIUM SERPL-MCNC: 8.6 MG/DL (ref 8.6–10.4)
CHLORIDE BLD-SCNC: 115 MMOL/L (ref 98–107)
CO2: 26 MMOL/L (ref 20–31)
CREAT SERPL-MCNC: 0.43 MG/DL (ref 0.7–1.2)
FIO2: 40
GFR AFRICAN AMERICAN: >60 ML/MIN
GFR NON-AFRICAN AMERICAN: >60 ML/MIN
GFR SERPL CREATININE-BSD FRML MDRD: ABNORMAL ML/MIN/{1.73_M2}
GFR SERPL CREATININE-BSD FRML MDRD: ABNORMAL ML/MIN/{1.73_M2}
GLOBULIN: ABNORMAL G/DL (ref 1.5–3.8)
GLUCOSE BLD-MCNC: 106 MG/DL (ref 75–110)
GLUCOSE BLD-MCNC: 140 MG/DL (ref 75–110)
GLUCOSE BLD-MCNC: 189 MG/DL (ref 75–110)
GLUCOSE BLD-MCNC: 216 MG/DL (ref 75–110)
GLUCOSE BLD-MCNC: 250 MG/DL (ref 70–99)
GLUCOSE BLD-MCNC: 260 MG/DL (ref 75–110)
GLUCOSE BLD-MCNC: 83 MG/DL (ref 75–110)
GLUCOSE BLD-MCNC: 87 MG/DL (ref 75–110)
HCT VFR BLD CALC: 28.1 % (ref 40.7–50.3)
HEMOGLOBIN: 8.7 G/DL (ref 13–17)
INR BLD: 4.5
MAGNESIUM: 2 MG/DL (ref 1.6–2.6)
MCH RBC QN AUTO: 30 PG (ref 25.2–33.5)
MCHC RBC AUTO-ENTMCNC: 31 G/DL (ref 28.4–34.8)
MCV RBC AUTO: 96.9 FL (ref 82.6–102.9)
MODE: ABNORMAL
NEGATIVE BASE EXCESS, ART: ABNORMAL (ref 0–2)
NRBC AUTOMATED: 0 PER 100 WBC
O2 DEVICE/FLOW/%: ABNORMAL
PATIENT TEMP: ABNORMAL
PDW BLD-RTO: 14.6 % (ref 11.8–14.4)
PLATELET # BLD: 455 K/UL (ref 138–453)
PMV BLD AUTO: 11.9 FL (ref 8.1–13.5)
POC HCO3: 30.8 MMOL/L (ref 21–28)
POC O2 SATURATION: 95 % (ref 94–98)
POC PCO2 TEMP: ABNORMAL MM HG
POC PCO2: 44.1 MM HG (ref 35–48)
POC PH TEMP: ABNORMAL
POC PH: 7.45 (ref 7.35–7.45)
POC PO2 TEMP: ABNORMAL MM HG
POC PO2: 72.4 MM HG (ref 83–108)
POSITIVE BASE EXCESS, ART: 6 (ref 0–3)
POTASSIUM SERPL-SCNC: 3.5 MMOL/L (ref 3.7–5.3)
PROTHROMBIN TIME: 42 SEC (ref 9–12)
RBC # BLD: 2.9 M/UL (ref 4.21–5.77)
REASON FOR REJECTION: NORMAL
SAMPLE SITE: ABNORMAL
SODIUM BLD-SCNC: 150 MMOL/L (ref 135–144)
TCO2 (CALC), ART: 32 MMOL/L (ref 22–29)
TOTAL PROTEIN: 6.3 G/DL (ref 6.4–8.3)
WBC # BLD: 20.1 K/UL (ref 3.5–11.3)
ZZ NTE CLEAN UP: ORDERED TEST: NORMAL
ZZ NTE WITH NAME CLEAN UP: SPECIMEN SOURCE: NORMAL

## 2019-04-19 PROCEDURE — 6370000000 HC RX 637 (ALT 250 FOR IP): Performed by: INTERNAL MEDICINE

## 2019-04-19 PROCEDURE — 6360000002 HC RX W HCPCS: Performed by: STUDENT IN AN ORGANIZED HEALTH CARE EDUCATION/TRAINING PROGRAM

## 2019-04-19 PROCEDURE — 94667 MNPJ CHEST WALL 1ST: CPT

## 2019-04-19 PROCEDURE — 2700000000 HC OXYGEN THERAPY PER DAY

## 2019-04-19 PROCEDURE — 71045 X-RAY EXAM CHEST 1 VIEW: CPT

## 2019-04-19 PROCEDURE — 99233 SBSQ HOSP IP/OBS HIGH 50: CPT | Performed by: INTERNAL MEDICINE

## 2019-04-19 PROCEDURE — 80048 BASIC METABOLIC PNL TOTAL CA: CPT

## 2019-04-19 PROCEDURE — 99232 SBSQ HOSP IP/OBS MODERATE 35: CPT | Performed by: INTERNAL MEDICINE

## 2019-04-19 PROCEDURE — 2500000003 HC RX 250 WO HCPCS: Performed by: INTERNAL MEDICINE

## 2019-04-19 PROCEDURE — 76937 US GUIDE VASCULAR ACCESS: CPT

## 2019-04-19 PROCEDURE — 6370000000 HC RX 637 (ALT 250 FOR IP): Performed by: NURSE PRACTITIONER

## 2019-04-19 PROCEDURE — 2500000003 HC RX 250 WO HCPCS: Performed by: STUDENT IN AN ORGANIZED HEALTH CARE EDUCATION/TRAINING PROGRAM

## 2019-04-19 PROCEDURE — 85610 PROTHROMBIN TIME: CPT

## 2019-04-19 PROCEDURE — 36415 COLL VENOUS BLD VENIPUNCTURE: CPT

## 2019-04-19 PROCEDURE — 36600 WITHDRAWAL OF ARTERIAL BLOOD: CPT

## 2019-04-19 PROCEDURE — 6370000000 HC RX 637 (ALT 250 FOR IP): Performed by: PHYSICIAN ASSISTANT

## 2019-04-19 PROCEDURE — 94003 VENT MGMT INPAT SUBQ DAY: CPT

## 2019-04-19 PROCEDURE — 82947 ASSAY GLUCOSE BLOOD QUANT: CPT

## 2019-04-19 PROCEDURE — 6370000000 HC RX 637 (ALT 250 FOR IP): Performed by: FAMILY MEDICINE

## 2019-04-19 PROCEDURE — 2100000001 HC CVICU R&B

## 2019-04-19 PROCEDURE — 94668 MNPJ CHEST WALL SBSQ: CPT

## 2019-04-19 PROCEDURE — 80076 HEPATIC FUNCTION PANEL: CPT

## 2019-04-19 PROCEDURE — 6360000002 HC RX W HCPCS: Performed by: INTERNAL MEDICINE

## 2019-04-19 PROCEDURE — 83735 ASSAY OF MAGNESIUM: CPT

## 2019-04-19 PROCEDURE — 94762 N-INVAS EAR/PLS OXIMTRY CONT: CPT

## 2019-04-19 PROCEDURE — 82803 BLOOD GASES ANY COMBINATION: CPT

## 2019-04-19 PROCEDURE — 6360000002 HC RX W HCPCS: Performed by: FAMILY MEDICINE

## 2019-04-19 PROCEDURE — 97110 THERAPEUTIC EXERCISES: CPT

## 2019-04-19 PROCEDURE — 99233 SBSQ HOSP IP/OBS HIGH 50: CPT | Performed by: PSYCHIATRY & NEUROLOGY

## 2019-04-19 PROCEDURE — 99232 SBSQ HOSP IP/OBS MODERATE 35: CPT | Performed by: NURSE PRACTITIONER

## 2019-04-19 PROCEDURE — 85027 COMPLETE CBC AUTOMATED: CPT

## 2019-04-19 PROCEDURE — 2580000003 HC RX 258: Performed by: NURSE PRACTITIONER

## 2019-04-19 PROCEDURE — 99291 CRITICAL CARE FIRST HOUR: CPT | Performed by: INTERNAL MEDICINE

## 2019-04-19 PROCEDURE — 94770 HC ETCO2 MONITOR DAILY: CPT

## 2019-04-19 PROCEDURE — 94640 AIRWAY INHALATION TREATMENT: CPT

## 2019-04-19 PROCEDURE — 99024 POSTOP FOLLOW-UP VISIT: CPT | Performed by: PHYSICIAN ASSISTANT

## 2019-04-19 PROCEDURE — 6360000002 HC RX W HCPCS: Performed by: NURSE PRACTITIONER

## 2019-04-19 PROCEDURE — 6370000000 HC RX 637 (ALT 250 FOR IP): Performed by: STUDENT IN AN ORGANIZED HEALTH CARE EDUCATION/TRAINING PROGRAM

## 2019-04-19 PROCEDURE — 6360000002 HC RX W HCPCS: Performed by: THORACIC SURGERY (CARDIOTHORACIC VASCULAR SURGERY)

## 2019-04-19 RX ORDER — SODIUM CHLORIDE 450 MG/100ML
INJECTION, SOLUTION INTRAVENOUS CONTINUOUS
Status: DISCONTINUED | OUTPATIENT
Start: 2019-04-19 | End: 2019-04-25 | Stop reason: HOSPADM

## 2019-04-19 RX ORDER — METOPROLOL TARTRATE 5 MG/5ML
5 INJECTION INTRAVENOUS ONCE
Status: COMPLETED | OUTPATIENT
Start: 2019-04-19 | End: 2019-04-19

## 2019-04-19 RX ORDER — LISINOPRIL 5 MG/1
5 TABLET ORAL DAILY
Status: DISCONTINUED | OUTPATIENT
Start: 2019-04-19 | End: 2019-04-25 | Stop reason: HOSPADM

## 2019-04-19 RX ORDER — METOPROLOL TARTRATE 5 MG/5ML
5 INJECTION INTRAVENOUS EVERY 6 HOURS
Status: DISCONTINUED | OUTPATIENT
Start: 2019-04-19 | End: 2019-04-20

## 2019-04-19 RX ORDER — PHYTONADIONE 1 MG/.5ML
1 INJECTION, EMULSION INTRAMUSCULAR; INTRAVENOUS; SUBCUTANEOUS ONCE
Status: COMPLETED | OUTPATIENT
Start: 2019-04-19 | End: 2019-04-19

## 2019-04-19 RX ORDER — INSULIN GLARGINE 100 [IU]/ML
30 INJECTION, SOLUTION SUBCUTANEOUS DAILY
Status: DISCONTINUED | OUTPATIENT
Start: 2019-04-19 | End: 2019-04-25 | Stop reason: HOSPADM

## 2019-04-19 RX ADMIN — FENTANYL CITRATE 25 MCG: 50 INJECTION, SOLUTION INTRAMUSCULAR; INTRAVENOUS at 15:13

## 2019-04-19 RX ADMIN — METOPROLOL TARTRATE 5 MG: 5 INJECTION, SOLUTION INTRAVENOUS at 20:09

## 2019-04-19 RX ADMIN — INSULIN GLARGINE 30 UNITS: 100 INJECTION, SOLUTION SUBCUTANEOUS at 10:00

## 2019-04-19 RX ADMIN — Medication 500000 UNITS: at 20:04

## 2019-04-19 RX ADMIN — Medication 15 ML: at 21:00

## 2019-04-19 RX ADMIN — Medication 10 ML: at 09:36

## 2019-04-19 RX ADMIN — ALBUTEROL SULFATE 2.5 MG: 2.5 SOLUTION RESPIRATORY (INHALATION) at 14:29

## 2019-04-19 RX ADMIN — INSULIN LISPRO 6 UNITS: 100 INJECTION, SOLUTION INTRAVENOUS; SUBCUTANEOUS at 10:05

## 2019-04-19 RX ADMIN — DESMOPRESSIN ACETATE 40 MG: 0.2 TABLET ORAL at 20:04

## 2019-04-19 RX ADMIN — OXYCODONE HYDROCHLORIDE AND ACETAMINOPHEN 2 TABLET: 5; 325 TABLET ORAL at 02:35

## 2019-04-19 RX ADMIN — POTASSIUM CHLORIDE 20 MEQ: 1.5 POWDER, FOR SOLUTION ORAL at 09:36

## 2019-04-19 RX ADMIN — METOPROLOL TARTRATE 25 MG: 25 TABLET ORAL at 09:33

## 2019-04-19 RX ADMIN — PROPOFOL 15 MCG/KG/MIN: 10 INJECTION, EMULSION INTRAVENOUS at 02:35

## 2019-04-19 RX ADMIN — METOPROLOL TARTRATE 5 MG: 5 INJECTION, SOLUTION INTRAVENOUS at 16:45

## 2019-04-19 RX ADMIN — ACETAMINOPHEN 650 MG: 650 SUPPOSITORY RECTAL at 18:40

## 2019-04-19 RX ADMIN — POLYETHYLENE GLYCOL 3350 17 G: 17 POWDER, FOR SOLUTION ORAL at 09:36

## 2019-04-19 RX ADMIN — ALBUTEROL SULFATE 2.5 MG: 2.5 SOLUTION RESPIRATORY (INHALATION) at 03:56

## 2019-04-19 RX ADMIN — METOPROLOL TARTRATE 5 MG: 5 INJECTION, SOLUTION INTRAVENOUS at 12:01

## 2019-04-19 RX ADMIN — FAMOTIDINE 20 MG: 20 TABLET, FILM COATED ORAL at 20:04

## 2019-04-19 RX ADMIN — POTASSIUM CHLORIDE 20 MEQ: 1.5 POWDER, FOR SOLUTION ORAL at 20:11

## 2019-04-19 RX ADMIN — INSULIN LISPRO 2 UNITS: 100 INJECTION, SOLUTION INTRAVENOUS; SUBCUTANEOUS at 04:20

## 2019-04-19 RX ADMIN — FAMOTIDINE 20 MG: 20 TABLET, FILM COATED ORAL at 09:29

## 2019-04-19 RX ADMIN — MULTIPLE VITAMINS W/ MINERALS TAB 1 TABLET: TAB at 09:30

## 2019-04-19 RX ADMIN — DOXYCYCLINE HYCLATE 100 MG: 100 TABLET, COATED ORAL at 09:29

## 2019-04-19 RX ADMIN — ASPIRIN 81 MG 81 MG: 81 TABLET ORAL at 09:33

## 2019-04-19 RX ADMIN — ALBUTEROL SULFATE 2.5 MG: 2.5 SOLUTION RESPIRATORY (INHALATION) at 20:03

## 2019-04-19 RX ADMIN — Medication 500000 UNITS: at 13:06

## 2019-04-19 RX ADMIN — CLOPIDOGREL 75 MG: 75 TABLET, FILM COATED ORAL at 09:33

## 2019-04-19 RX ADMIN — Medication 500000 UNITS: at 18:55

## 2019-04-19 RX ADMIN — FUROSEMIDE 40 MG: 10 INJECTION, SOLUTION INTRAMUSCULAR; INTRAVENOUS at 09:35

## 2019-04-19 RX ADMIN — ALBUTEROL SULFATE 2.5 MG: 2.5 SOLUTION RESPIRATORY (INHALATION) at 08:13

## 2019-04-19 RX ADMIN — Medication 100 MG: at 09:35

## 2019-04-19 RX ADMIN — Medication 500000 UNITS: at 09:34

## 2019-04-19 RX ADMIN — PHYTONADIONE 1 MG: 1 INJECTION, EMULSION INTRAMUSCULAR; INTRAVENOUS; SUBCUTANEOUS at 11:48

## 2019-04-19 RX ADMIN — FENTANYL CITRATE 25 MCG: 50 INJECTION, SOLUTION INTRAMUSCULAR; INTRAVENOUS at 23:56

## 2019-04-19 RX ADMIN — ENOXAPARIN SODIUM 40 MG: 40 INJECTION SUBCUTANEOUS at 09:30

## 2019-04-19 RX ADMIN — FUROSEMIDE 40 MG: 10 INJECTION, SOLUTION INTRAMUSCULAR; INTRAVENOUS at 18:54

## 2019-04-19 RX ADMIN — OXYCODONE HYDROCHLORIDE AND ACETAMINOPHEN 2 TABLET: 5; 325 TABLET ORAL at 20:08

## 2019-04-19 RX ADMIN — DOXYCYCLINE HYCLATE 100 MG: 100 TABLET, COATED ORAL at 20:04

## 2019-04-19 RX ADMIN — LISINOPRIL 5 MG: 5 TABLET ORAL at 15:09

## 2019-04-19 RX ADMIN — Medication 15 ML: at 09:34

## 2019-04-19 ASSESSMENT — PAIN SCALES - GENERAL
PAINLEVEL_OUTOF10: 10
PAINLEVEL_OUTOF10: 8
PAINLEVEL_OUTOF10: 1
PAINLEVEL_OUTOF10: 7

## 2019-04-19 ASSESSMENT — PULMONARY FUNCTION TESTS
PIF_VALUE: 23
PIF_VALUE: 11
PIF_VALUE: 11

## 2019-04-19 NOTE — PROGRESS NOTES
[facility] - Progress Note    Today's Date and Time: 4/19/2019, 1:57 PM    Impression :   1. CAD with cardiac arrest x 3. (on the field, in ER and in OR)  2. S/P emergent CABG x 4 on 4-7-19.   3. Open chest post CABG. 4. S/p delayed primary closure of chest incision on 4-8-19  5. Improvement in Poor cardiac function with LVEF 10%-->45%  6. Exposed orthopedic hardware Rt leg  7. S/P removal of Protruding orthopedic nail from Rt lateral knee area. Old blood and malodor. 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 8. S/P ORIF Rt proximal tibia. Non compliant with follow up. 9. Fevers  10. Pulmonary edema  11. ALMITA resolved    Recommendations:     · Discontinue ceftaroline. · Started on doxycycline for chronic leg osteomyelitis  · Patient has chronic infection of leg hardware but this is likely not affecting the major problems which include the cardiac arrests and multiple cerebral and cerebellar infarcts      Medical Decision Making/Summary/Discussion:   · Patient admitted with chest pain and cardiac arrests x 3  · Emergent CABG x 4. S/P sternum closure on 4-8-19  · Found to have protruding Rt tibial orthopedic screw from prior ORIF of tibial fracture  · No overt infection of the leg  · Temp 103 on 4-11-19. Repeat blood cultures: No growth . Sputum with mixed oral tabitha. Vancomycin started. · ALMITA with Creatinine 0.4-->1. 19. Vancomycin d/c. Ceftaroline started. · ALMITA resolved  · HIDA scan ordered for elevated LFTs: Normal on 4-15-19  · MRI with occlusion Lt carotid artery and multiple infracts  · Extubated 4-19-19. More responsive  · Has 2 peripheral IVs, will D/C central line (12 days in)  · Rt groin puncture site had some drainage.  Wound cleansed on 4-19-19  Infection Control Recommendations   · [place] Precautions  · Contact Isolation     Antimicrobial Stewardship Recommendations     · Discontinuation of therapy    Coordination of Outpatient Care: · Estimated Length of IV antimicrobials: D/C 4-11-19  · Patient will need Midline Catheter Insertion:   · Patient will need PICC line Insertion:  · Patient will need: Home IV , [place],  SNF,  LTAC: TBD  · Patient will need outpatient wound care:TBD    Chief complaint/reason for consultation:   · Emergent CABG  · Protruding orthopedic nail from Rt knee      History of Present Illness:   Jennifer Murray is a 46y.o.-year-old  male who was initially admitted on 4/7/2019. Patient seen at the request of Chandu Rodriguez. INITIAL HISTORY:     Patient apparently developed chest pain and called EMS. Had cardiac arrest at the scene, was resuscitated and brought to ER where he had second cardiac arrest. Was resuscitated once again and taken to OR for emergent CABG x 4. He had another cardiac arrest in OR. Has survived the above and is back in the ICU. Found to have area of malodor on lateral aspect Rt knee, corresponding to a protruding orthopedic screw from a remote ORIF of the Rt  Tibial plateau. The screw has been removed and sent for culture. X rays show healed fracture of the proximal tibia. There is additional hardware in place that will likely need to be removed if patient survives. CURRENT EVALUATION : 4/19/2019    Patient seen and examined this morning. Follows simple commands this am.   Extubated 4-19-19    Afebrile  VS stable, except HTN    Labs reviewed: 4/19/2019    CT of head 4-13-19 showed scattered infarcts of different ages. MRI follow up 4/14 showed multiple new embolic strokes. Absent flow Lt carotid artery. Ultrasound 4-15-19 with occlusion of Lt internal carotid artery. CXR 4-15-19: Bilateral effusions, parahilar and lower zone airspace opacities. Retrocardiac consolidation. Changes are those of CHF. Na 149 -> 150. K 3.5  WBCs 17.5 -> 25.1 -> 30 0> 20.1  Hgb 9.1. -->8.7    ALMITA has resolved with creatinine of 0.43 today.      Echocardiogram showed mildly reduced ejection fraction. No clot visualized. Cultures:  Wound:  · Orthopedic screw: Leg screw cultures found. Direct exam showed many gram + cocci in clusters and few gram - rods, NGTD. Lab reports quality of sample is questioned and to interpret results carefully. Blood:  · Blood cultures 4/11 x 2 with no growth to date. Urine:  · Urine culture 4/12 with no growth to date. Sputum  · Sputum culture 4/12 with mixed bacterial morphotypes. Discussed with RN, family. I have personally reviewed the past medical history, past surgical history, medications, social history, and family history, and I have updated the database accordingly.   Past Medical History:     Past Medical History:   Diagnosis Date    CAD (coronary artery disease) 04/07/2019    Cardiac arrest ([facility] 04/07/2019    Osteolysis 04/07/2019    R Knee hardware    PEA (Pulseless electrical activity) ([facility] 04/07/2019    [facility]ipheral vascular [facility] 04/07/2019       Past Surgical  History:     Past Surgical History:   Procedure Laterality Date    CARDIAC CATHETERIZATION  04/07/2019    MVD-Stv    CORONARY ARTERY BYPASS GRAFT  04/07/2019    x4 per Dr. Ulysses Gandy, [address] [place] [address] GRAFT N/A 4/7/2019    EMERGENT CPR,  CORONARY ARTERY BYPASS X4, ON PUMP ,CHEST LEFT OPEN; SWAN, ANGELA PER ANESTHESIA performed by Ford Bean MD at [address] [place] [address],[address]      unsure if correct known tibial surgery    HARDWARE REMOVAL Right 04/07/2019    R Knee internal screw protruding through skin    RECONSTRUCTIVE REPAIR STERNAL N/A 4/8/2019    STERNAL  WOUND WASHOUT AND CLOSURE S/P CABG performed by Ford Bean MD at [address] [place] [address] off roof, external fixation and internal hardware    TONSILLECTOMY         Medications:      insulin glargine  30 Units Subcutaneous Daily    metoprolol  5 mg Intravenous Q6H    lisinopril  5 mg Oral Daily    furosemide  40 mg Intravenous BID    potassium chloride  20 mEq Oral BID    doxycycline hyclate  100 mg Oral 2 times per day    insulin lispro  0-12 Units Subcutaneous Q6H    famotidine  20 mg Oral BID    enoxaparin  40 mg Subcutaneous Daily    nystatin  5 mL Oral 4x Daily    aspirin  81 mg Oral Daily    docusate  100 mg Oral Daily    dextrose  12.5 g Intravenous Once    albuterol  2.5 mg Nebulization Q6H    sodium chloride flush  10 mL Intravenous 2 times per day    sodium chloride flush  10 mL Intravenous 2 times per day    polyethylene glycol  17 g Oral Daily    chlorhexidine  15 mL Mouth/Throat BID    therapeutic multivitamin-minerals  1 tablet Oral Daily with breakfast    atorvastatin  40 mg Oral Nightly    clopidogrel  75 mg Oral Daily       Social History:     Social History     Socioeconomic History    Marital status: Unknown     Spouse name: Not on file    Number of children: Not on file    Years of education: Not on file    Highest education level: Not on file   Occupational History    Not on file   Social Needs    Financial resource strain: Not on file    Food insecurity:     Worry: Not on file     Inability: Not on file    Transportation needs:     Medical: Not on file     Non-medical: Not on file   Tobacco Use    Smoking status: Unknown If Ever Smoked   Substance and Sexual Activity    Alcohol use: Not on file    Drug use: Not on file    Sexual activity: Not on file   Lifestyle    Physical activity:     Days per week: Not on file     Minutes per session: Not on file    Stress: Not on file   Relationships    Social connections:     Talks on phone: Not on file     Gets together: Not on file     Attends Church service: Not on file     Active member of club or organization: Not on file     Attends meetings of clubs or organizations: Not on file     Relationship status: Not on file    Intimate partner violence:     Fear of current or ex partner: Not on file     Emotionally abused: Not on file     Physically abused: Not on file     Forced sexual activity: Not on file   Other Topics Concern    Not on file   Social History Narrative    Not on file       Family History:   History reviewed. No pertinent family history. Allergies:   Patient has no allergy information on record. Review of Systems:   Unable to provide. Sedated on ventilator. Physical Examination :     Patient Vitals for the past 8 hrs:   BP Temp Temp src Pulse Resp SpO2   04/19/19 1240 -- -- -- -- -- 97 %   04/19/19 1232 -- -- -- -- 28 97 %   04/19/19 1052 (!) 173/83 99.9 °F (37.7 °C) Oral 132 24 94 %   04/19/19 1008 -- -- -- 126 21 99 %   04/19/19 0813 -- -- -- 107 25 99 %     General Appearance: Extubated  Head:  Normocephalic, no trauma  Eyes: Pupils equal, round, reactive to light; sclera anicteric; conjunctivae pink. No embolic phenomena. ENT: Oropharynx clear, without erythema, exudate, or thrush. No tenderness of sinuses. Mouth/throat: mucosa pink and moist. No lesions. Dentition in good repair. Neck:Supple, without lymphadenopathy. Thyroid normal, No bruits. Pulmonary/Chest: Coarse sounds  Cardiovascular: Regular rate and rhythm without murmurs, rubs, or gallops. Abdomen: Soft, non tender. Bowel sounds normal. No organomegaly  All four Extremities: No cyanosis, clubbing, edema, or effusions. Neurologic: Intermittently awake, responds appropriately at times. Skin: Warm and dry with good turgor. Signs of peripheral arterial insufficiency. Rt lateral knee with open incision. Chest incision closed.     Medical Decision Making -Laboratory:   I have independently reviewed/ordered the following labs:    CBC with Differential:   Recent Labs     04/18/19  0416 04/18/19  1050 04/19/19  0643   WBC 26.6*  --  20.1*   HGB 9.1*  --  8.7*   HCT 30.8*  --  28.1*    392 455*     BMP:   Recent Labs     04/18/19  0416 04/19/19  0643   * 150*   K 3.2* 3.5*   * 115*   CO2 25 26   BUN 20 24*   CREATININE 0.46* 0.43*   MG 1.9 2.0 ORDERING SYSTEM PROVIDED HISTORY: post op   TECHNOLOGIST PROVIDED HISTORY:   post op       FINDINGS:   There is redemonstration of bilateral pulmonary congestion/edema that is   similar compared to prior. January Factor are bibasilar effusions. January Factor is no   pneumothorax.  The heart is prominent.  The upper abdomen is unremarkable. The extrathoracic soft tissues are unremarkable. January Factor is an endotracheal   tube with the tip in the midtrachea.  There is a gastric tube with the tip in   the proximal aspect of the stomach.  The right internal jugular line has been   removed.  The [place]-Carly catheter has been removed.           Impression   Cardiomegaly and bilateral pulmonary congestion/edema that is similar   compared to prior.       Bibasilar effusions and stable endotracheal tube and gastric tube.             Medical Decision Making-Other: Thank you for allowing us to participate in the care of this patient. Please call with questions.     Era Officer, MD

## 2019-04-19 NOTE — PROGRESS NOTES
Nutrition Assessment (Enteral Nutrition)    Type and Reason for Visit: Reassess    Nutrition Recommendations:   -Continue NPO status   -Continue tube feeding of Vital AF 1.2 (Semi-elemental and carb controlled) @60 mL/hr until 1440 mLs infused (x24 hrs) ~>1728 kcals, 108 gms protein, 1168 mLs water (w/ current propofol rate 1923 kcals)  -Will monitor EN intake/tolerance; will adjust as needed. Nutrition Assessment: Pt nutritionally stable -Tube feed currently running @ goal rate of 60 mL/hr. Intubated during time of visit - Extubation order for this late morning. Pt w/ 12 lb wt reduction x 12 days - actual wt loss vs fluid loss? Will monitor nutrition progression.       Malnutrition Assessment:  · Malnutrition Status: Insufficient data    Nutrition Risk Level: High    Nutrition Needs:  · Estimated Daily Total Kcal: 20-25 kcal/kg ~>6956-8945 kcals/d   · Estimated Daily Protein (g): 1.2-2.0 gm/kg ~> gms/d     Nutrition Diagnosis:   · Problem: Inadequate oral intake  · Etiology: related to Impaired respiratory function-inability to consume food, Insufficient energy/nutrient consumption     Signs and symptoms:  as evidenced by NPO status due to medical condition, Intubation, Nutrition support - EN    Objective Information:  · Wound Type: Multiple, Open Wounds, Skin Tears  · Current Nutrition Therapies:  · Oral Diet Orders: NPO   · Tube Feeding (TF) Orders:   · Feeding Route: Nasogastric  · Formula: Semi-elemental  · Rate (ml/hr):60 mL/hr    · Volume (ml/day): 1440 mLs   · Duration: Continuous  · Current TF & Flush Orders Provides: Vital AF 1.2 (Semi-elemental and carb controlled) @ 60 mL/hr until 1440 mLs infused (x24 hrs) ~>1728 kcals, 108 gms protein, 1168 mLs water  · Goal TF & Flush Orders Provides: Vital AF 1.2 (Semi-elemental and carb controlled) @ 60 mL/hr until 1440 mLs infused (x24 hrs) ~>1728 kcals, 108 gms protein, 1168 mLs water  · Additional Calories: propofol running @ 7.4 mL/hr ~>195 kcals  · Anthropometric Measures:  · Ht: 5' 8\" (172.7 cm)   · Current Body Wt: 160 lb (72.6 kg)  · Admission Body Wt: 176 lb (79.8 kg)  · Weight Change: 16 lb wt reduction x 12 days   · Ideal Body Wt: 154 lb (69.9 kg), % Ideal Body 114% (adm/ideal)  · BMI Classification: BMI 25.0 - 29.9 Overweight    Nutrition Interventions:   Continue NPO, Continue current Tube Feeding  Continued Inpatient Monitoring, Education not appropriate at this time    Nutrition Evaluation:   · Evaluation: Goal achieved   · Goals: Pt to meet % of est'd needs via EN   · Monitoring: Nutrition Progression, TF Intake, TF Tolerance, Skin Integrity, Wound Healing, I&O, Weight, Pertinent Labs, Monitor Bowel Function, Monitor Hemodynamic Status      Electronically signed by Bruno Rubio RD, LD on 4/19/19 at 11:27 AM    Contact Number: [phone]

## 2019-04-19 NOTE — PROGRESS NOTES
04/17/19  0438 04/18/19  0416 04/18/19  1050 04/19/19  0643   WBC 30.0* 26.6*  --  20.1*   HGB 9.8* 9.1*  --  8.7*   HCT 32.7* 30.8*  --  28.1*   .2 100.7  --  96.9    417 392 455*     BMP:   Recent Labs     04/17/19  0438 04/18/19  0416 04/19/19  0643   * 150* 150*   K 3.6* 3.2* 3.5*   * 114* 115*   CO2 24 25 26   BUN 22* 20 24*   CREATININE 0.49* 0.46* 0.43*     PT/INR:   Recent Labs     04/16/19  1123 04/17/19  0438 04/18/19  0416   PROTIME 12.6* 12.5* 12.3*   INR 1.2 1.2 1.2     APTT: No results for input(s): APTT in the last 72 hours. Chest X-Ray: 4/17/19:   ET tube in good position.  Enteric tube passes beneath the diaphragm.  Intact   sternotomy wires.  Cardiac leads overlie the chest.  Enlarged heart with   moderate pulmonary edema.  Bilateral pleural effusions.  No pneumothorax.           Impression   Stable moderate pulmonary edema and effusions.       Stable supporting devices. I/O:  I/O last 3 completed shifts: In: 1867.5 [I.V.:563.5; NG/GT:1304]  Out: 2850 [Urine:2850]      Assessment & Plan:   Patient Active Problem List   Diagnosis    [facility] arrest [facility] [facility]    Acute ST elevation myocardial infarction (STEMI) of anterior wall (HCC)    Severe left ventricular systolic dysfunction    Acute respiratory failure with hypoxemia (HCC)    Cardiogenic pulmonary edema (HCC)    PAD (peripheral artery disease) (HCC)    Subacute osteomyelitis of right tibia ([facility] [address] [facility]    Exposed orthopaedic hardware ([facility] [address] [facility]    Metabolic encephalopathy    Abnormal CT of the head    Fever chills    Hepatitis    Acute cerebral infarction associated with systemic hypoxia or ischemia    Multi infarct state     1.  S/p CABG Emergent              POD 12              Neuro wants -180              HR now better controlled with lopressor              Lasix /K increased today               Tolerating tube feeds, BS +              Neuro - making improvements daily (light sedation with propofol due to appearing anxious on vent)              Ortho following for right knee infection              ID also following, on teflaro IV              Family on board with Trach and PEG if needed. Will plan to wean vent, possible extubation              Blood sugars still high, manage per IM              Free water flushes for hypernatremia              ABLA - Hgb stable    CTA head and neck per endovascular surgery  DVT ppx: Lovenox & SCD's while stationary  Patient is on BB, ASA, Statin therapy per protocol   Plan for discharge LTAC most likely    The above recommendations including medications and orders were discussed and agreed upon with Dr. Darry Meckel, the attending on service for the cardiothoracic surgery group today.      Electronically signed by CINDY Zayas on 4/19/2019 at 8:23 AM

## 2019-04-19 NOTE — PLAN OF CARE
Problem: OXYGENATION/RESPIRATORY FUNCTION  Goal: Patient will maintain patent airway  Outcome: Ongoing  Goal: Patient will achieve/maintain normal respiratory rate/effort  Description  Respiratory rate and effort will be within normal limits for the patient  Outcome: Ongoing     Problem: MECHANICAL VENTILATION  Goal: Patient will maintain patent airway  Outcome: Ongoing  Goal: Oral health is maintained or improved  Outcome: Ongoing  Goal: ET tube will be managed safely  Outcome: Ongoing  Goal: Ability to express needs and understand communication  Outcome: Ongoing  Goal: Mobility/activity is maintained at optimum level for patient  Outcome: Ongoing     Problem: SKIN INTEGRITY  Goal: Skin integrity is maintained or improved  Outcome: Ongoing     Problem: ACTIVITY INTOLERANCE/IMPAIRED MOBILITY  Goal: Mobility/activity is maintained at optimum level for patient  Outcome: Ongoing     Problem: COMMUNICATION IMPAIRMENT  Goal: Ability to express needs and understand communication  Outcome: Ongoing

## 2019-04-19 NOTE — PROGRESS NOTES
ENDOVASCULAR NEUROSURGERY PROGRESS NOTE  4/19/2019 2:23 PM  Subjective:   Admit Date: 4/7/2019  PCP: No primary care provider on file. CTA with left ICA occlusion and left vert occlusion, LU 60% stenosis. Patient extubated this morning. Objective:   Vitals: BP (!) 173/83   Pulse 132   Temp 99.9 °F (37.7 °C) (Oral)   Resp 28   Ht 5' 8\" (1.727 m)   Wt 160 lb 15 oz (73 kg)   SpO2 97%   BMI 24.47 kg/m²     General appearance: Lying in bed, NAD,  Lungs: CTAB  Heart: RRR  Abdomen: soft, NTND, bowel sounds normal    Neuro exam: Follows simple commands, some naming difficulty. CN II-XII: no gross facial assymmetry, pupils 2 mm reactive to light bilaterally, EOMI. Jocelyn spontaneously against gravity in the UEs and at least 2/5 in the LEs.        Medications and labs:   Scheduled Meds:   insulin glargine  30 Units Subcutaneous Daily    metoprolol  5 mg Intravenous Q6H    lisinopril  5 mg Oral Daily    furosemide  40 mg Intravenous BID    potassium chloride  20 mEq Oral BID    doxycycline hyclate  100 mg Oral 2 times per day    insulin lispro  0-12 Units Subcutaneous Q6H    famotidine  20 mg Oral BID    enoxaparin  40 mg Subcutaneous Daily    nystatin  5 mL Oral 4x Daily    aspirin  81 mg Oral Daily    docusate  100 mg Oral Daily    dextrose  12.5 g Intravenous Once    albuterol  2.5 mg Nebulization Q6H    sodium chloride flush  10 mL Intravenous 2 times per day    sodium chloride flush  10 mL Intravenous 2 times per day    polyethylene glycol  17 g Oral Daily    chlorhexidine  15 mL Mouth/Throat BID    therapeutic multivitamin-minerals  1 tablet Oral Daily with breakfast    atorvastatin  40 mg Oral Nightly    clopidogrel  75 mg Oral Daily     Continuous Infusions:   sodium chloride      niCARdipine      dextrose       CBC:   Recent Labs     04/17/19  0438 04/18/19  0416 04/18/19  1050 04/19/19  0643   WBC 30.0* 26.6*  --  20.1*   HGB 9.8* 9.1*  --  8.7*    417 392 455*     BMP: Recent Labs     04/17/19  0438 04/18/19  0416 04/19/19  0643   * 150* 150*   K 3.6* 3.2* 3.5*   * 114* 115*   CO2 24 25 26   BUN 22* 20 24*   CREATININE 0.49* 0.46* 0.43*   GLUCOSE 278* 265* 250*     Hepatic:   Recent Labs     04/19/19  0643   *   *   BILITOT 1.01   ALKPHOS 206*     Troponin: No results for input(s): TROPONINI in the last 72 hours. BNP: No results for input(s): BNP in the last 72 hours. Lipids:   Recent Labs     04/17/19 0438   CHOL 56   HDL 13*     INR:   Recent Labs     04/17/19  0438 04/18/19  0416 04/19/19  0754   INR 1.2 1.2 4.5       Assessment and Recommendations:   46years old male s/p PEA arrest and CABG 4/7, found ot have multiple b/l hemisphere small strokes and cerebral strokes, Carotid U/S with b/l severe stenosis and possible occlusion n the LICA, CTA neck with LICA occlusion and left Vert occlusion and LU 60% stenosis. 1. Continue ASA and Plavix  2. Avoid symptomatic hypotension.    3. Plan for cerebral angiogram with possible LU stenting next week     Discussed with Dr. Diana Campos MD  Stroke, [facility]  [address] [place]  Electronically signed 4/19/2019 at 2:23 PM

## 2019-04-19 NOTE — PROGRESS NOTES
Progress Note    Patient - Philip Wolfe  Date of Admission -  2019  4:48 AM  Date of Evaluation -  2019  Room and Bed Number -  1012/1012-01   Hospital Day - 12    CHIEF COMPLAINT :   Chief Complaint   Patient presents with    Cardiac Arrest     diff breather, brought in on cpap with elevations in EKG on EMS arrival.        SUBJECTIVE:     OVERNIGHT EVENTS:         Awake ifeanyi sbt   Following commands   Sec small   Low grade temp         SECRETIONS Amount:  [x] Small [] Moderate  [] Large  [] None  Color:     [x] White [] Colored  [] Bloody    SEDATION:  RAAS Score:  [] Propofol gtt  [] Versed gtt  [] Ativan gtt   [x] No Sedation    PARALYZED:  [x] No    [] Yes    VASOPRESSORS:  [x] No    [] Yes  [] Levophed [] Dopamine [] Vasopressin  [] Dobutamine [] Phenylephrine [] Epinephrine   Ros unable to perform due to intubation     OBJECTIVE:     VITAL SIGNS:  BP (!) 126/57   Pulse 126   Temp 98.9 °F (37.2 °C) (Oral)   Resp 21   Ht 5' 8\" (1.727 m)   Wt 160 lb 15 oz (73 kg)   SpO2 99%   BMI 24.47 kg/m²   Tmax over 24 hours:  Temp (24hrs), Av.4 °F (37.4 °C), Min:98.9 °F (37.2 °C), Max:99.6 °F (37.6 °C)      Patient Vitals for the past 8 hrs:   BP Temp Temp src Pulse Resp SpO2 Weight   19 1008 -- -- -- 126 21 99 % --   19 0813 -- -- -- 107 25 99 % --   19 0516 -- -- -- -- -- -- 160 lb 15 oz (73 kg)   19 0357 -- -- -- 95 19 100 % --   19 0300 (!) 126/57 98.9 °F (37.2 °C) Oral 89 12 98 % --           Intake/Output Summary (Last 24 hours) at 2019 1014  Last data filed at 2019 0559  Gross per 24 hour   Intake 1797.5 ml   Output 2850 ml   Net -1052.5 ml     Date 19 0000 - 19 2359   Shift 3054-7657 2569-8806 1632-9269 24 Hour Total   INTAKE   I.V.(mL/kg) 283.5(3.9)   283.5(3.9)   NG/GT(mL/kg) 603(8.3)   603(8.3)   Shift Total(mL/kg) 886. 5(12.1)   886. 5(12.1)   OUTPUT   Urine(mL/kg/hr) 1000(1.7)   1000   Shift Total(mL/kg) 1000(13.7)   1000(13.7)   Weight (kg) 73 73 73 73     Wt Readings from Last 3 Encounters:   04/19/19 160 lb 15 oz (73 kg)     Body mass index is 24.47 kg/m². PHYSICAL EXAM:  Head and neck atraumatic, normocephalic    Lymph nodes-no cervical, supraclavicular lymphadenopathy    Neck-no JVP elevation    Lungs - prolonged exp . Dec bases post , clear anteriorly     CVS- S1, S2 regular. No S3 no S4, no murmurs    Abdomen-nontender, nondistended. Bowel sounds are present. No organomegaly    Lower extremity-+ edema    Upper extremity-no edema    Neurological-grossly normal cranial nerves.       Wound vac le       MEDICATIONS:  Scheduled Meds:   insulin glargine  30 Units Subcutaneous Daily    phytonadione  1 mg Subcutaneous Once    furosemide  40 mg Intravenous BID    metoprolol tartrate  25 mg Per NG tube BID    potassium chloride  20 mEq Oral BID    doxycycline hyclate  100 mg Oral 2 times per day    insulin lispro  0-12 Units Subcutaneous Q6H    famotidine  20 mg Oral BID    enoxaparin  40 mg Subcutaneous Daily    nystatin  5 mL Oral 4x Daily    aspirin  81 mg Oral Daily    docusate  100 mg Oral Daily    dextrose  12.5 g Intravenous Once    albuterol  2.5 mg Nebulization Q6H    sodium chloride flush  10 mL Intravenous 2 times per day    sodium chloride flush  10 mL Intravenous 2 times per day    polyethylene glycol  17 g Oral Daily    chlorhexidine  15 mL Mouth/Throat BID    therapeutic multivitamin-minerals  1 tablet Oral Daily with breakfast    atorvastatin  40 mg Oral Nightly    clopidogrel  75 mg Oral Daily     Continuous Infusions:   sodium chloride      niCARdipine      propofol 15 mcg/kg/min (04/19/19 0235)    dextrose       PRN Meds:     magnesium hydroxide 30 mL Daily PRN   fentanNYL 25 mcg Q1H PRN   sodium chloride flush 10 mL PRN   labetalol 10 mg Q6H PRN   naloxone 0.4 mg PRN   albuterol 2.5 mg Q6H PRN   sodium chloride flush 10 mL PRN   sodium chloride flush 10 mL PRN   calcium chloride IVPB 1 g PRN   magnesium sulfate 1 g PRN   potassium chloride 20 mEq PRN   acetaminophen 650 mg Q4H PRN   acetaminophen 650 mg Q4H PRN   oxyCODONE-acetaminophen 1 tablet Q4H PRN   Or     oxyCODONE-acetaminophen 2 tablet Q4H PRN   diphenhydrAMINE 25 mg Nightly PRN   bisacodyl 10 mg Daily PRN   ondansetron 4 mg Q8H PRN   hydrALAZINE 5 mg Q5 Min PRN   albumin human 25 g PRN   glucose 15 g PRN   dextrose 12.5 g PRN   glucagon (rDNA) 1 mg PRN   dextrose 100 mL/hr PRN       SUPPORT DEVICES: [x] Ventilator [] BIPAP  [] Nasal Cannula [] Room Air    VENT SETTINGS (Comprehensive) (if applicable):      Vent Information  $Ventilation: Off Vent  Ventilator Started: Yes  Ventilator Stopped: (S) Yes  Skin Assessment: Clean, dry, & intact  Equipment Changed: Expiratory Filter  Vent Type: Servo i  Vent Mode: CPAP  Vt Ordered: 660 mL  Rate Set: 14 bmp  Pressure Support: 6 cmH20  FiO2 : 40 %  Sensitivity: 5  PEEP/CPAP: 5  I Time/ I Time %: 0.85 s  Cuff Pressure (cm H2O): 25 cm H2O  Humidification Source: Heated wire  Humidification Temp: 37  Humidification Temp Measured: 35.3  Circuit Condensation: Drained  Nitric Oxide/Epoprostenol In Use?: No  Additional Respiratory  Assessments  Pulse: 126  Resp: 21  SpO2: 99 %  End Tidal CO2: 33 (%)  Position: Semi-Quintana's  Humidification Source: Heated wire  Humidification Temp: 37  Circuit Condensation: Drained  Oral Care Completed?: Yes  Oral Care: Mouthwash, Lip moisturizer applied, Mouth moisturizer, Mouth suctioned  Subglottic Suction Done?: Yes  Cuff Pressure (cm H2O): 25 cm H2O  Skin barrier applied: Yes  Updated: 04/19/19 0519     POC pH 7.451High     POC pCO2 44.1 mm Hg    POC PO2 72.4Low  mm Hg    POC HCO3 30.8High  mmol/L    TCO2 (calc), Art 32High  mmol/L    Negative Base Excess, Art NOT REPORTED    Positive Base Excess, Art 6High     POC O2 SAT 95 %    O2 Device/Flow/% Adult Ventilator    Sam Test POSITIVE    Sample Site Right Radial Artery    Mode PRVC    FIO2 40.0       ABGs:     Lab Results   Component BILIRUBINUR NEGATIVE 04/14/2019    GLUCOSEU 3+ 04/14/2019    KETUA NEGATIVE 04/14/2019    AMORPHOUS NOT REPORTED 04/14/2019       HgBA1c:    Lab Results   Component Value Date    LABA1C 10.0 04/17/2019           ASSESSMENT:     Patient Active Problem List    Diagnosis Date Noted    Acute cerebral infarction associated with systemic hypoxia or ischemia     Multi infarct state     Fever chills     Hepatitis     Metabolic encephalopathy     Abnormal CT of the head     Cardiac arrest ([facility] [address] 04/07/2019    Severe left ventricular systolic dysfunction 64/14/7605    [facility] with hypoxemia ([facility]) 04/07/2019    Cardiogenic pulmonary edema (HCC) 04/07/2019    PAD (peripheral artery disease) ([facility]) 04/07/2019    Acute ST elevation myocardial infarction (STEMI) of anterior wall ([facility])     Subacute osteomyelitis of right tibia ([facility])     Exposed orthopaedic hardware ([facility] [address]    b/l basal atelectasis    Acute kidney injury-resolved   Sirs positive sepsis better    CVA,    Ischemic hepatitis better    Elevated inr   Hyper natremia and hyper chloremia   Encephalopathy, metabolic improving   Left ica stenosis - d/w Dr Vince Patterson no intervention   PLAN:     WEAN PER PROTOCOL:  [] No   [x] Yes  [] N/A    DISCONTINUE ANY LABS:   [x] No   [] Yes    ICU PROPHYLAXIS:  Stress ulcer:  [] PPI Agent  [x] [id number] [] Sucralfate  [] Other:  VTE:   [x] Enoxaparin  [] Unfract. Heparin Subcut  [] EPC Cuffs    NUTRITION:  [x] NPO post extubation  [] start Tube Feeding  [] TPN  [] PO        INSULIN DRIP:   [x] No   [] Yes    CONSULTATION NEEDED:  [x] No   [] Yes    FAMILY UPDATED:    [x] No   [] Yes son     TRANSFER OUT OF ICU:   [x] No   [] Yes    ADDITIONAL PLAN:  ifeanyi sbt since this am 8.30 am and no wob , awake response and following commands , good cuff leak . Sec small  , xray chest improved rt base atelectasis and left basal atelectasis persists - sec white . Tf held and stomach emptied . Extubated at bedside no immediate complications. Will continue duo neb q  6 for now   Sec management   His inr is elevated - likely  Due to hepatic injury and nutritional deficiency  Will give sc vitamin k one dose   Check LFT   He has hyper natremia and hyperchloremia . But tf are now held and he is getting lantus and iss so will monitor bs and based on results add d5 other wise will use 1/2 ns at 40 ml/hr   Cont lasix   If doesnot pass bed side swallow will need to change lopressor to iv      Post extubation - knows his name and phonating     D/w cardiology   D/w rn in detail   D/w rt       Electronically signed by Saji Tran MD on 4/19/2019 at 10:14 AM   Total critical care time caring for this patient with life threatening, unstable organ failure, including direct patient contact, management of life support systems, review of data including imaging and labs, discussions with other team members and physicians at least 28 Min so far today, excluding procedures.

## 2019-04-19 NOTE — PROGRESS NOTES
Neurology Nurse Practitioner Progress Note      INTERVAL HISTORY: This is a 46 y.o.  male admitted 4/7/2019 for Cardiac arrest ([facility] [I46.9]. This is a follow-up neurology progress note. The patient was examined and the chart was reviewed. Discussed with the RN. Per RN, pt was anxious & agitated overnight and required sedation. Got extubated today (4/19). HPI: Karlie Meza is a 46 y.o. male with H/O HTN, DM, PAD, prior head trauma (fall), osteolysis, who was admitted 4/7/2019 with Cardiac arrest ([facility] [address] [facility] [I46.9]. According to the medical records, pt called EMS due to worsening shortness of breath. He had audible rales & was placed on C-PAP; breathing treatment was given along with Solu-Medrol 125 mg IV &  mg. His EKG was concerning for STEMI. Pt went into cardiac arrest as he was bring brought into the ED. EMS started CPR & he was placed on defibrillator. He was in PEA arrest. Got intubated & got IV Heparin bolus. He underwent urgent cardiac cath & found to have multivessel CAD with severe LV systolic dysfunction; occluded L external iliac artery & severe diffuse disease of R iliac artery. Later, he was taken to OR where he arrested again. He underwent emergent CABG x 4 (4/7). Got admitted in cardiac ICU on high dose vasopressors. His EF improved from 15% to 40% post-surgery. Found out to have chronic infection RLE s/p R proximal tibial ORIF with exposed hardware (placed in 2005). Nail was removed & sent for Cx. Pt was started on vancomycin initially; later, got switched to Teflaro. Pt developed ischemic hepatitis & ALMITA post-procedure. Reportedly, pt was able to follow simple commands, moving all limbs, trying to pull at his tubes on 4/10. Sedation was increased due to the agitation. Later, he was weaned off; Precedex & Versed drips have been off since the morning of 4/11. It was noticed that pt stopped responding to commands with no spontaneous movements reported. He had brainstem reflexes.  Neurology was consulted on 4/12.          insulin glargine  30 Units Subcutaneous Daily    furosemide  40 mg Intravenous BID    metoprolol tartrate  25 mg Per NG tube BID    potassium chloride  20 mEq Oral BID    doxycycline hyclate  100 mg Oral 2 times per day    insulin lispro  0-12 Units Subcutaneous Q6H    famotidine  20 mg Oral BID    enoxaparin  40 mg Subcutaneous Daily    nystatin  5 mL Oral 4x Daily    aspirin  81 mg Oral Daily    docusate  100 mg Oral Daily    dextrose  12.5 g Intravenous Once    albuterol  2.5 mg Nebulization Q6H    sodium chloride flush  10 mL Intravenous 2 times per day    sodium chloride flush  10 mL Intravenous 2 times per day    polyethylene glycol  17 g Oral Daily    chlorhexidine  15 mL Mouth/Throat BID    therapeutic multivitamin-minerals  1 tablet Oral Daily with breakfast    atorvastatin  40 mg Oral Nightly    clopidogrel  75 mg Oral Daily       Past Medical History:   Diagnosis Date    CAD (coronary artery disease) 04/07/2019    Cardiac arrest (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 04/07/2019    [facility]ipheral [facility] 04[facility]07/2019       Past Surgical History:   Procedure Laterality Date    CARDIAC CATHETERIZATION  04/07/2019    MVD-Stv    CORONARY ARTERY BYPASS GRAFT  04/07/2019    x4 per Dr. Jose Luis Cedillo, [address] [place] [address] GRAFT N/A 4/7/2019    EMERGENT CPR,  CORONARY ARTERY BYPASS X4, ON PUMP ,CHEST LEFT OPEN; SWAN, ANGELA PER ANESTHESIA performed by Chava Albarran MD at [address] [place] [address],[address]      unsure if correct known tibial surgery    HARDWARE REMOVAL Right 04/07/2019    R Knee internal screw protruding through skin    RECONSTRUCTIVE REPAIR STERNAL N/A 4/8/2019    STERNAL  WOUND WASHOUT AND CLOSURE S/P CABG performed by Chava Albarran MD at [facility] off roof, external fixation and internal hardware    TONSILLECTOMY         PHYSICAL EXAM:      Blood pressure (!) 126/57, pulse 107, temperature 98.9 °F (37.2 °C), temperature source Oral, resp. rate 25, height 5' 8\" (1.727 m), weight 160 lb 15 oz (73 kg), SpO2 99 %. Limited Neurological Examination:  Pt just got extubated (4/19); he was alert, awake & oriented, other than thought he was in [place] & it was March; no idea who the president was. He was able to recall within short time after he was corrected  Speech is a bit difficult to understand; however, family reported that they were able to understand him. Pt recognized his family at bed side  He followed simple commands  Moving/lifting all limbs, UEs>LEs  C/O pain in RLE; had dressing on   UEs DTRs & L knee reflex - intact; no ankle clonus  R plantar - equivocal       DATA      Lab Results   Component Value Date    WBC 20.1 (H) 04/19/2019    HGB 8.7 (L) 04/19/2019    HCT 28.1 (L) 04/19/2019     (H) 04/19/2019    CHOL 56 04/17/2019    TRIG 107 04/17/2019    HDL 13 (L) 04/17/2019     (H) 04/16/2019     (H) 04/16/2019     (H) 04/19/2019    K 3.5 (L) 04/19/2019     (H) 04/19/2019    CREATININE 0.43 (L) 04/19/2019    BUN 24 (H) 04/19/2019    CO2 26 04/19/2019    INR 1.2 04/18/2019    VJILKFMI42 1994 (H) 04/17/2019    FOLATE 17.6 04/17/2019    LABA1C 10.0 (H) 04/17/2019           CT HEAD (4/12/2019):   Scattered areas of encephalomalacia, bifrontal & high L parietal areas; remote infarcts. Subacute infarct in the L lower parietal region? Dystrophic calcification in L frontal lobe? Chronic microvascular changes. MRI BRAIN (4/14/2019):   Multiple acute ischemic infarcts in the R brachium pontis, R cerebellum & b/l cerebral white matter. Central embolic etiology is suggested. No hemorrhage. B/L anterior frontal & high L frontoparietal encephalomalacia; prior insult.     Absent flow void in the L ICA consistent with high-grade proximal stenosis vs occlusion. CAROTID DOPPLER (4/12/2019):      CTA HEAD & NECK (4/18/2019): Occlusion L ICA extending to level skull base. L vertebral artery occluded with reconstitution L V4 segment. L petrous & cavernous segments are occluded. R ICA - 60% stenosis. Moderate atherosclerotic changes involving the remainder of the cervical arterial vasculature. L ostium - 60-70% focal stenosis with reconstitution L carotid terminus. ECHO (4/7/2019): EF 15%. Severe hypokinesis of anterior LV wall & mid inferior wall with apical akinesis      REPEAT ECHO (4/122019): EF 40%. Grade II diastolic dysfunction. LV wall thickness is mildly increased       EEG (4/12/2019): Moderate diffuse encephalopathy                          IMPRESSION & PLAN: 46 y.o.  male admitted with  Encephalopathy; multifactorial including s/p cardiac arrest x 2, sepsis, multi-infarct state, ALMITA & ischemic hepatitis. Pt s/p extubation (4/19); alert, partially oriented; following simple commands; moving/lifting all limbs     PEA arrest x 2; multivessel CAD; s/p emergent CABG x 4 (4/7/19)    Multi-infarct state; normalize BP; continue ASA 81 mg QD, Plavix 75 mg QD & Lipitor 40 mg HS    CTA head & neck - L ICA & L vertebral artery - occluded     Febrile; chronic infection RLE s/p R proximal tibial ORIF with exposed hardware (placed in 2005); non-compliant; is on doxycycline as per ID team    Ischemic hepatitis; ALMITA    Pt has been weaning; however, family agreed for trach/PEG tube placement if needed.  Palliative care on board    Continue PT/OT    Comorbid conditions - HTN, DM, prior head trauma, PAD, osteolysis    Will follow

## 2019-04-19 NOTE — PROGRESS NOTES
[facility]    Progress Note    4/19/2019    8:53 AM    Name:   Damon Frank  MRN:     [id number]     Acct:      [de-identified]   Room:   [address] [place] [address] Day:  12  Admit Date:  4/7/2019  4:48 AM    PCP:   No primary care provider on file. Code Status:  Full Code    Subjective:     C/C:   Chief Complaint   Patient presents with    Cardiac Arrest     diff breather, brought in on cpap with elevations in EKG on EMS arrival.      Interval History Status: not changed  Continued to be more awake and also consistently following commands now  Expressing desire to take ET tube out  Noted agitation and anxiety last night needing sedation  No fevers, chills reported overnight     Brief History:     The patient is a 46 y.o. male who is admitted for  cardiac arrest and STEMI. Patient was admitted on 4/7/19 after he was brought in by EMS for dyspnea, acute respiratory failure. Patient was placed on CPAP and nebulization treatment. Initial EKG showed possible anterior septal STEMI. On arrival to [facility] emergency room patient had PEA arrest and was resuscitated per ACS protocol with 3 rounds of CPR and epinephrine with successful ROSC. Patient underwent emergent cardiac cath and showed multivessel CAD with severe LV dysfunction with EF 15% and severe hypokinesis. Patient had emergent CABG x 4 on 4/7/19 with closure of chest wall on 4/8/19. Aide Jed Patient developed acute kidney injury post-CAB with creatinine peaking at 1.99 which improved after fluid resuscitation and pressor support. Patient was taken off pressor support. Patient was asked. On 4/10/19. Patient had altered mental status upon weaning from  sedatives . CT head showed scattered areas of encephalomalacia in bifrontal and left parietal areas consistent with remote infarct and possible subacute infarct in left lower parietal region.   Patient has underlying history of hypertension with non compliance to treatment.      Patient unable  To provide history as he is intubated . Has not been following up with providers for care . Patient also has wound in R knee and discharge . H/o Orthopedic surgery . Patient tolerating tube feeds     Review of Systems:     Un able to obtain from patient given altered mental status    Medications:      Allergies:  Not on File    Current Meds:   Scheduled Meds:    insulin glargine  30 Units Subcutaneous Daily    furosemide  40 mg Intravenous BID    metoprolol tartrate  25 mg Per NG tube BID    potassium chloride  20 mEq Oral BID    doxycycline hyclate  100 mg Oral 2 times per day    insulin lispro  0-12 Units Subcutaneous Q6H    famotidine  20 mg Oral BID    enoxaparin  40 mg Subcutaneous Daily    nystatin  5 mL Oral 4x Daily    aspirin  81 mg Oral Daily    docusate  100 mg Oral Daily    dextrose  12.5 g Intravenous Once    albuterol  2.5 mg Nebulization Q6H    sodium chloride flush  10 mL Intravenous 2 times per day    sodium chloride flush  10 mL Intravenous 2 times per day    polyethylene glycol  17 g Oral Daily    chlorhexidine  15 mL Mouth/Throat BID    therapeutic multivitamin-minerals  1 tablet Oral Daily with breakfast    atorvastatin  40 mg Oral Nightly    clopidogrel  75 mg Oral Daily     Continuous Infusions:    niCARdipine      propofol 15 mcg/kg/min (04/19/19 0235)    sodium chloride 20 mL/hr (04/14/19 1141)    dextrose       PRN Meds: magnesium hydroxide, fentanNYL, sodium chloride flush, labetalol, naloxone, albuterol, sodium chloride flush, sodium chloride flush, calcium chloride IVPB, magnesium sulfate, potassium chloride, acetaminophen, acetaminophen, oxyCODONE-acetaminophen **OR** oxyCODONE-acetaminophen, diphenhydrAMINE, bisacodyl, ondansetron, hydrALAZINE, albumin human, glucose, dextrose, glucagon (rDNA), dextrose    Data:     Past Medical History:   has a past medical history of CAD (coronary artery disease), Cardiac arrest ([facility] [address] Osteolysis, PEA (Pulseless electrical activity) ([facility] [address] and PVD (peripheral vascular disease) ([facility] [address] Social History:        Family History: History reviewed. No pertinent family history. Vitals:  BP (!) 126/57   Pulse 107   Temp 98.9 °F (37.2 °C) (Oral)   Resp 25   Ht 5' 8\" (1.727 m)   Wt 160 lb 15 oz (73 kg)   SpO2 99%   BMI 24.47 kg/m²   Temp (24hrs), Av.4 °F (37.4 °C), Min:98.9 °F (37.2 °C), Max:99.6 °F (37.6 °C)    Recent Labs     19  0700 19  1135 19  1654 19  2034   POCGLU 221* 260* 214* 195*     I/O (24Hr):     Intake/Output Summary (Last 24 hours) at 2019 0853  Last data filed at 2019 0559  Gross per 24 hour   Intake 1867.5 ml   Output 2850 ml   Net -982.5 ml     Labs:    Lab Results   Component Value Date/Time    SPECIAL NOT REPORTED 2019 07:55 AM     Lab Results   Component Value Date/Time    CULTURE NO GROWTH 2019 07:55 AM     Lab Results   Component Value Date    POCPH 7.451 2019    POCPCO2 44.1 2019    POCPO2 72.4 2019    POCHCO3 30.8 2019    NBEA NOT REPORTED 2019    PBEA 6 2019    ZOG6LCW 32 2019    LCLE0VIP 95 2019    FIO2 40.0 2019     Physical Examination:        General appearance: wide awake with open eyes and nodding head to conversations, seems appropriate most of the time  Mental Status: following motor commands today  Lungs:  clear to auscultation bilaterally, normal effort  Heart:  regular rate and rhythm, no murmur  Abdomen:  soft, nontender, nondistended, normal bowel sounds  Extremities:  no edema, redness, tenderness in the calves  Skin:  no gross lesions, rashes, induration    Assessment:        Primary Problem  Cardiac arrest [facility])    Active Hospital Problems    Diagnosis Date Noted    Acute cerebral infarction associated with systemic hypoxia or ischemia [I63.89]     Multi infarct state [I69.30]     Fever chills [R50.9]     Hepatitis [D10.7]     Metabolic encephalopathy [G93.41]     Abnormal CT of the head [R93.0]     Cardiac arrest ([facility] [I46.9] 04/07/2019    Severe left ventricular systolic dysfunction [L05.9] 04/07/2019    Acute respiratory failure with hypoxemia ([facility]) [J96.01] 04/07/2019    Cardiogenic pulmonary edema ([facility] [I50.1] 04/07/2019    PAD (peripheral artery disease) ([facility]) [I73.9] 04/07/2019    Acute ST elevation myocardial infarction (STEMI) of anterior wall ([facility]) [I21.09]     Subacute osteomyelitis of right tibia [facility]) [S11.777]     [facility]opa[facility] [R70.597W]      Plan:        - Cardiac arrest: PEA needed 3 rounds of chest compressions  - Multivessel CAD, status post CABG now  - Encephalopathy, significant, EEG with no definite seizures. MRI with multiple small strokes in cerebral and cerebellar areas. Continued significant improvement in condition  - CVA, multiple, small, bilateral cerebral area and left cerebellum area. NO regional large infarcts, continued close follow up of clinical status, improving overall  - Carotid stenosis, bilateral seems worse on left side. CT angiogram with left sided complete ICA stenosis  - Hypertension, on drips, likely worse drom agitattion  - Acute respiratory failure, post CABG, possible extubation today  - Diabetes, poorly controlled.  Increase lantus dose to 30 units today  - Fevers with underlying chronic osteomyelitis, cefatorline per ID team. Worsening leukocytosis, repeat X ray today, if continued infiltration might need to add gram negative coverage in setting of worsened leukocytosis        Vandana Kamara MD  4/19/2019  8:53 AM

## 2019-04-19 NOTE — PROGRESS NOTES
Progress Note    Patient - Millicent Bence  Date of Admission -  2019  4:48 AM  Date of Evaluation -  2019  Room and Bed Number -  1012/1012-01   Hospital Day - 11    CHIEF COMPLAINT :   Chief Complaint   Patient presents with    Cardiac Arrest     diff breather, brought in on cpap with elevations in EKG on EMS arrival.        SUBJECTIVE:     OVERNIGHT EVENTS:         More awake   Eyes open   Follows commands           SECRETIONS Amount:  [x] Small [] Moderate  [] Large  [] None  Color:     [x] White [] Colored  [] Bloody    SEDATION:  RAAS Score:  [] Propofol gtt  [] Versed gtt  [] Ativan gtt   [x] No Sedation    PARALYZED:  [x] No    [] Yes    VASOPRESSORS:  [x] No    [] Yes  [] Levophed [] Dopamine [] Vasopressin  [] Dobutamine [] Phenylephrine [] Epinephrine   Ros unable to perform due to intubation and ENCEPHALOPATHY     OBJECTIVE:     VITAL SIGNS:  BP (!) 161/65   Pulse 96   Temp 99.6 °F (37.6 °C) (Oral)   Resp 16   Ht 5' 8\" (1.727 m)   Wt 172 lb 2.9 oz (78.1 kg)   SpO2 98%   BMI 26.18 kg/m²   Tmax over 24 hours:  Temp (24hrs), Av.5 °F (37.5 °C), Min:99.2 °F (37.3 °C), Max:99.9 °F (37.7 °C)      Patient Vitals for the past 8 hrs:   BP Temp Temp src Pulse Resp SpO2   19 -- -- -- 96 16 98 %   19 -- -- -- 96 13 97 %   19 1808 -- -- -- 97 19 96 %   19 1800 (!) 161/65 99.6 °F (37.6 °C) Oral 95 18 99 %   19 1600 (!) 154/64 -- -- 95 18 99 %   19 1541 (!) 153/66 99.5 °F (37.5 °C) Axillary 92 17 98 %   19 1509 -- -- -- 92 19 100 %   19 1500 (!) 147/66 -- -- 90 16 97 %           Intake/Output Summary (Last 24 hours) at 2019 2216  Last data filed at 2019 1808  Gross per 24 hour   Intake 1822 ml   Output 1850 ml   Net -28 ml     Date 19 0000 - 19 2359   Shift 5372-4924 9674-0956 0490-0833 24 Hour Total   INTAKE   I.V.(mL/kg) 264(3.4)  280(3.6) 544(7)   NG/GT(mL/kg) 577(7.4) 70(0.9) 631(8.1) 0630(63.9)   Shift Total(mL/kg) 841(10.8) 70(0.9) 911(11.7) 1822(23.3)   OUTPUT   Urine(mL/kg/hr)   1850 1850   Shift Total(mL/kg)   1850(23.7) 1850(23.7)   Weight (kg) 78.1 78.1 78.1 78.1     Wt Readings from Last 3 Encounters:   04/18/19 172 lb 2.9 oz (78.1 kg)     Body mass index is 26.18 kg/m². PHYSICAL EXAM:  Head and neck atraumatic, normocephalic  ictrus dec   Lymph nodes-no cervical, supraclavicular lymphadenopathy    Neck-no JVP elevation    Lungs - Ventilating all lobes  But dec bases without any rales, rhonchi, wheezes  No bronchial breath sounds. Chest expansion equal bilaterally    CVS- S1, S2 regular. No S3 no S4, no murmurs    Abdomen-nontender, nondistended. Bowel sounds are present.   No organomegaly    Lower extremity-no edema    Upper extremity-no edema    Neurological-improving see above       MEDICATIONS:  Scheduled Meds:   furosemide  40 mg Intravenous BID    metoprolol tartrate  25 mg Per NG tube BID    potassium chloride  20 mEq Oral BID    doxycycline hyclate  100 mg Oral 2 times per day    insulin glargine  20 Units Subcutaneous Daily    insulin lispro  0-12 Units Subcutaneous Q6H    famotidine  20 mg Oral BID    enoxaparin  40 mg Subcutaneous Daily    nystatin  5 mL Oral 4x Daily    aspirin  81 mg Oral Daily    docusate  100 mg Oral Daily    dextrose  12.5 g Intravenous Once    albuterol  2.5 mg Nebulization Q6H    sodium chloride flush  10 mL Intravenous 2 times per day    sodium chloride flush  10 mL Intravenous 2 times per day    polyethylene glycol  17 g Oral Daily    chlorhexidine  15 mL Mouth/Throat BID    therapeutic multivitamin-minerals  1 tablet Oral Daily with breakfast    atorvastatin  40 mg Oral Nightly    clopidogrel  75 mg Oral Daily     Continuous Infusions:   niCARdipine      propofol 15 mcg/kg/min (04/18/19 1139)    sodium chloride 20 mL/hr (04/14/19 1141)    dextrose       PRN Meds:     magnesium hydroxide 30 mL Daily PRN   fentanNYL 25 mcg Q1H PRN   sodium chloride flush 10 mL PRN   labetalol 10 mg Q6H PRN   naloxone 0.4 mg PRN   albuterol 2.5 mg Q6H PRN   sodium chloride flush 10 mL PRN   sodium chloride flush 10 mL PRN   calcium chloride IVPB 1 g PRN   magnesium sulfate 1 g PRN   potassium chloride 20 mEq PRN   acetaminophen 650 mg Q4H PRN   acetaminophen 650 mg Q4H PRN   oxyCODONE-acetaminophen 1 tablet Q4H PRN   Or     oxyCODONE-acetaminophen 2 tablet Q4H PRN   diphenhydrAMINE 25 mg Nightly PRN   bisacodyl 10 mg Daily PRN   ondansetron 4 mg Q8H PRN   hydrALAZINE 5 mg Q5 Min PRN   albumin human 25 g PRN   glucose 15 g PRN   dextrose 12.5 g PRN   glucagon (rDNA) 1 mg PRN   dextrose 100 mL/hr PRN       SUPPORT DEVICES: [x] Ventilator [] BIPAP  [] Nasal Cannula [] Room Air    VENT SETTINGS (Comprehensive) (if applicable):      Vent Information  $Ventilation: $Subsequent Day  Ventilator Started: Yes  Skin Assessment: Clean, dry, & intact  Equipment Changed: Expiratory Filter  Vent Type: Servo i  Vent Mode: CPAP  Vt Ordered: 660 mL  Rate Set: 14 bmp  Pressure Support: 6 cmH20  FiO2 : 40 %  Sensitivity: 5  PEEP/CPAP: 5  I Time/ I Time %: 0.85 s  Cuff Pressure (cm H2O): 25 cm H2O  Humidification Source: Heated wire  Humidification Temp: 36  Humidification Temp Measured: 36.1  Circuit Condensation: Drained  Nitric Oxide/Epoprostenol In Use?: No  Additional Respiratory  Assessments  Pulse: 96  Resp: 16  SpO2: 98 %  End Tidal CO2: 34 (%)  Position: Semi-Quintana's  Humidification Source: Heated wire  Humidification Temp: 36  Circuit Condensation: Drained  Oral Care Completed?: Yes  Oral Care: Mouthwash, Lip moisturizer applied, Mouth moisturizer, Mouth suctioned  Subglottic Suction Done?: Yes  Cuff Pressure (cm H2O): 25 cm H2O  Skin barrier applied: Yes    ABGs:     Lab Results   Component Value Date    GKA3QKE 29 04/18/2019    FIO2 40.0 04/18/2019       DATA:  Complete Blood Count:   Recent Labs     04/16/19  1123 04/17/19  0438 04/18/19  0416 04/18/19  1050   WBC 28.6* 30.0* 26.6* --    RBC 3.29* 3.23* 3.06*  --    HGB 10.1* 9.8* 9.1*  --    HCT 31.2* 32.7* 30.8*  --    MCV 94.8 101.2 100.7  --    MCH 30.7 30.3 29.7  --    MCHC 32.4 30.0 29.5  --    RDW 14.9* 15.0* 14.6*  --     351 417 392   MPV 12.1 12.0 12.0  --         Last 3 Blood Glucose:   Recent Labs     04/16/19  0504 04/16/19  1123 04/17/19  0438 04/18/19  0416   GLUCOSE 297* 324* 278* 265*        PT/INR:    Lab Results   Component Value Date    PROTIME 12.3 04/18/2019    INR 1.2 04/18/2019     PTT:    Lab Results   Component Value Date    APTT 25.7 04/07/2019       Comprehensive Metabolic Profile:   Recent Labs     04/16/19  1123 04/17/19  0438 04/18/19  0416    149* 150*   K 4.2 3.6* 3.2*   * 114* 114*   CO2 22 24 25   BUN 25* 22* 20   CREATININE 0.52* 0.49* 0.46*   GLUCOSE 324* 278* 265*   CALCIUM 8.7 8.8 8.6   PROT 6.3*  --   --    LABALBU 2.7*  --   --    BILITOT 1.30*  --   --    ALKPHOS 197*  --   --    *  --   --    *  --   --       Magnesium:   Lab Results   Component Value Date    MG 1.9 04/18/2019    MG 2.1 04/17/2019    MG 1.9 04/16/2019     Phosphorus: No results found for: PHOS  Ionized Calcium:   Lab Results   Component Value Date    CAION 0.97 04/09/2019    CAION 1.01 04/07/2019        Urinalysis:   Lab Results   Component Value Date    NITRU NEGATIVE 04/14/2019    COLORU DARK YELLOW 04/14/2019    PHUR 5.0 04/14/2019    WBCUA 0 TO 2 04/14/2019    RBCUA 2 TO 5 04/14/2019    MUCUS NOT REPORTED 04/14/2019    TRICHOMONAS NOT REPORTED 04/14/2019    YEAST NOT REPORTED 04/14/2019    BACTERIA NOT REPORTED 04/14/2019    SPECGRAV 1.024 04/14/2019    LEUKOCYTESUR NEGATIVE 04/14/2019    UROBILINOGEN Normal 04/14/2019    BILIRUBINUR NEGATIVE 04/14/2019    GLUCOSEU 3+ 04/14/2019    KETUA NEGATIVE 04/14/2019    AMORPHOUS NOT REPORTED 04/14/2019       HgBA1c:    Lab Results   Component Value Date    LABA1C 10.0 04/17/2019           ASSESSMENT:     Patient Active Problem List    Diagnosis Date Noted  Acute cerebral infarction associated with systemic hypoxia or ischemia     Multi infarct state     Fever chills     Hepatitis     Metabolic encephalopathy     Abnormal CT of the head     Cardiac arrest ([address] 04/07/2019    Severe left ventricular systolic dysfunction 57/95/9501    Acute respiratory failure with hypoxemia ([facility]) 04/07/2019    Cardiogenic pulmonary edema (HCC) 04/07/2019    PAD (peripheral artery disease) ([facility]) 04/07/2019    Acute ST elevation myocardial infarction (STEMI) of anterior wall (HCC)     Subacute osteomyelitis of right tibia ([facility])     Exposed orthopaedic hardware ([address]    pulmonary vascular edema     Acute kidney injury-resolved   Sirs positive sepsis better    CVA,    Ischemic hepatitis better    Encephalopathy, metabolic improving   Left ica stenosis - d/w Dr Maile Golden no intervention   PLAN:     WEAN PER PROTOCOL:  [] No   [x] Yes  [] N/A    DISCONTINUE ANY LABS:   [x] No   [] Yes    ICU PROPHYLAXIS:  Stress ulcer:  [] PPI Agent  [x] U5Dvcpp [] Sucralfate  [] Other:  VTE:   [] Enoxaparin  [x] Unfract. Heparin Subcut  [] EPC Cuffs    NUTRITION:  [] NPO [x] start Tube Feeding  [] TPN  [] PO        INSULIN DRIP:   [x] No   [] Yes    CONSULTATION NEEDED:  [x] No   [] Yes    FAMILY UPDATED:    [] No   [x] Yes son     TRANSFER OUT OF ICU:   [x] No   [] Yes    ADDITIONAL PLAN:  More awake   ifeanyi sbt 1 hr 45 min then became tachypnic   Cont daily sbt   Diuresis   Cont bb - add ace   D/w family         Electronically signed by Avni Plaza MD on 4/18/2019 at 10:16 PM   Total critical care time caring for this patient with life threatening, unstable organ failure, including direct patient contact, management of life support systems, review of data including imaging and labs, discussions with other team members and physicians at least 27  Min so far today, excluding procedures.

## 2019-04-19 NOTE — PROGRESS NOTES
[facility]   Progress Note                   Date:   4/19/2019  Patient name: Ralph Duncan  Date of admission:  4/7/2019  4:48 AM  MRN:   [id number]  YOB: 1965  PCP: No primary care provider on file. Reason for Admission: STEMI    Subjective:       S/P emergent CABG, POD#9    PEA Arrest, late presentation of STEMI with emergent bypass. Post op course complicated by neurological insult with multiple areas of ischemic stroke        Pt is doing better. Awake and alert. On nasal canula  S/P extubation ( 4/19)  SBP is high  SR with HR in 120-130        I/O last 3 completed shifts: In: 1867.5 [I.V.:563.5; NG/GT:1304]  Out: 4743 [Urine:2850]  No intake/output data recorded.       Medications:   Scheduled Meds:   insulin glargine  30 Units Subcutaneous Daily    metoprolol  5 mg Intravenous Q6H    furosemide  40 mg Intravenous BID    potassium chloride  20 mEq Oral BID    doxycycline hyclate  100 mg Oral 2 times per day    insulin lispro  0-12 Units Subcutaneous Q6H    famotidine  20 mg Oral BID    enoxaparin  40 mg Subcutaneous Daily    nystatin  5 mL Oral 4x Daily    aspirin  81 mg Oral Daily    docusate  100 mg Oral Daily    dextrose  12.5 g Intravenous Once    albuterol  2.5 mg Nebulization Q6H    sodium chloride flush  10 mL Intravenous 2 times per day    sodium chloride flush  10 mL Intravenous 2 times per day    polyethylene glycol  17 g Oral Daily    chlorhexidine  15 mL Mouth/Throat BID    therapeutic multivitamin-minerals  1 tablet Oral Daily with breakfast    atorvastatin  40 mg Oral Nightly    clopidogrel  75 mg Oral Daily       Continuous Infusions:   sodium chloride      niCARdipine      dextrose         CBC:   Recent Labs     04/17/19  0438 04/18/19  0416 04/18/19  1050 04/19/19  0643   WBC 30.0* 26.6*  --  20.1*   HGB 9.8* 9.1*  --  8.7*    417 392 455*     BMP:    Recent Labs     04/17/19  0438 04/18/19  0416 04/19/19  0643   * 150* 150* K 3.6* 3.2* 3.5*   * 114* 115*   CO2 24 25 26   BUN 22* 20 24*   CREATININE 0.49* 0.46* 0.43*   GLUCOSE 278* 265* 250*     Hepatic:   Recent Labs     04/19/19  0643   *   *   BILITOT 1.01   ALKPHOS 206*     Troponin: No results for input(s): TROPONINI in the last 72 hours. BNP: No results for input(s): BNP in the last 72 hours. Lipids:   Recent Labs     04/17/19  0438   CHOL 56   HDL 13*     INR:   Recent Labs     04/17/19  0438 04/18/19  0416 04/19/19  0754   INR 1.2 1.2 4.5       Objective:   Vitals: BP (!) 173/83   Pulse 132   Temp 99.9 °F (37.7 °C) (Oral)   Resp 28   Ht 5' 8\" (1.727 m)   Wt 160 lb 15 oz (73 kg)   SpO2 97%   BMI 24.47 kg/m²   General appearance: alert and oriented  HEENT: Head: Normocephalic, no lesions,   Bilateral bruist   Neck: no JVD  Lungs:  rales present  Heart: regular rate and rhythm, S1, S2 normal, no murmur, click, rub or gallop  Abdomen: soft, non-tender; bowel sounds normal  Extremities: mild leg edema        EKG: Sinus tachycardia with anteroseptal infarct      Coronary Angiography: 4/7/19  LMCA: has distal 60% stenosis. LAD: has ostial 90% stenosis and long proximal 80% stenosis. D1 has proximal 70% stenosis. LCx: has 20% stenosis. OM1 is very small. OM2 has proximal 90% stenosis. RCA: has proximal 50% stenosis, mid 60% stenosis and distal 80% stenosis. Ramus: has ostial 99% stenosis.     The LV gram was performed in the DUTTON 30 position. LVEF: 15%. LV Wall Motion: severe hypokinesis of the anterior wall and mid inferior wall with apical akinesis. Echo 4/12/19:  Left ventricle is normal in size. Global left ventricular systolic function is mildly reduced. Calculated ejection fraction is 40. Left ventricular wall thickness is mildly increased. Grade II diastolic dysfunction  Normal valvular function      Assessment:   1. PEA Arrest (Multiple times) with ROSC in few minutes - S/P extubation( 4/19)  2.  Late presentation of

## 2019-04-19 NOTE — PROGRESS NOTES
Order obtained for extubation. SpO2 of 99% on 40% FiO2. Patient extubated and placed on 6 liters/min via nasal cannula. Post extubation SpO2 is 95% with HR  127 bpm and RR 20 breaths/min. Patient had strong cough that was non-productive. Extubation Well tolerated by patient. .   Breath Sounds: clear    ABDULJALIERICH WARE   10:12 AM

## 2019-04-20 LAB
-: NORMAL
ANION GAP SERPL CALCULATED.3IONS-SCNC: 10 MMOL/L (ref 9–17)
BUN BLDV-MCNC: 24 MG/DL (ref 6–20)
BUN/CREAT BLD: ABNORMAL (ref 9–20)
CALCIUM SERPL-MCNC: 8.4 MG/DL (ref 8.6–10.4)
CHLORIDE BLD-SCNC: 115 MMOL/L (ref 98–107)
CO2: 27 MMOL/L (ref 20–31)
CREAT SERPL-MCNC: 0.5 MG/DL (ref 0.7–1.2)
GFR AFRICAN AMERICAN: >60 ML/MIN
GFR NON-AFRICAN AMERICAN: >60 ML/MIN
GFR SERPL CREATININE-BSD FRML MDRD: ABNORMAL ML/MIN/{1.73_M2}
GFR SERPL CREATININE-BSD FRML MDRD: ABNORMAL ML/MIN/{1.73_M2}
GLUCOSE BLD-MCNC: 124 MG/DL (ref 75–110)
GLUCOSE BLD-MCNC: 125 MG/DL (ref 75–110)
GLUCOSE BLD-MCNC: 144 MG/DL (ref 70–99)
GLUCOSE BLD-MCNC: 207 MG/DL (ref 75–110)
GLUCOSE BLD-MCNC: 270 MG/DL (ref 75–110)
HCT VFR BLD CALC: 32.2 % (ref 40.7–50.3)
HEMOGLOBIN: 9.4 G/DL (ref 13–17)
INR BLD: 1.3
MAGNESIUM: 2.1 MG/DL (ref 1.6–2.6)
MCH RBC QN AUTO: 29.1 PG (ref 25.2–33.5)
MCHC RBC AUTO-ENTMCNC: 29.2 G/DL (ref 28.4–34.8)
MCV RBC AUTO: 99.7 FL (ref 82.6–102.9)
NRBC AUTOMATED: 0 PER 100 WBC
PDW BLD-RTO: 14.4 % (ref 11.8–14.4)
PLATELET # BLD: 509 K/UL (ref 138–453)
PLATELET # BLD: 527 K/UL (ref 138–453)
PMV BLD AUTO: 12 FL (ref 8.1–13.5)
POTASSIUM SERPL-SCNC: 3.1 MMOL/L (ref 3.7–5.3)
PROTHROMBIN TIME: 13.5 SEC (ref 9–12)
RBC # BLD: 3.23 M/UL (ref 4.21–5.77)
REASON FOR REJECTION: NORMAL
SODIUM BLD-SCNC: 152 MMOL/L (ref 135–144)
WBC # BLD: 19.5 K/UL (ref 3.5–11.3)
ZZ NTE CLEAN UP: ORDERED TEST: NORMAL
ZZ NTE WITH NAME CLEAN UP: SPECIMEN SOURCE: NORMAL

## 2019-04-20 PROCEDURE — 92610 EVALUATE SWALLOWING FUNCTION: CPT

## 2019-04-20 PROCEDURE — 80048 BASIC METABOLIC PNL TOTAL CA: CPT

## 2019-04-20 PROCEDURE — 94668 MNPJ CHEST WALL SBSQ: CPT

## 2019-04-20 PROCEDURE — 97530 THERAPEUTIC ACTIVITIES: CPT

## 2019-04-20 PROCEDURE — 83735 ASSAY OF MAGNESIUM: CPT

## 2019-04-20 PROCEDURE — 2580000003 HC RX 258: Performed by: NURSE PRACTITIONER

## 2019-04-20 PROCEDURE — 6370000000 HC RX 637 (ALT 250 FOR IP): Performed by: PHYSICIAN ASSISTANT

## 2019-04-20 PROCEDURE — 2500000003 HC RX 250 WO HCPCS: Performed by: STUDENT IN AN ORGANIZED HEALTH CARE EDUCATION/TRAINING PROGRAM

## 2019-04-20 PROCEDURE — 99233 SBSQ HOSP IP/OBS HIGH 50: CPT | Performed by: PSYCHIATRY & NEUROLOGY

## 2019-04-20 PROCEDURE — 99232 SBSQ HOSP IP/OBS MODERATE 35: CPT | Performed by: INTERNAL MEDICINE

## 2019-04-20 PROCEDURE — 6360000002 HC RX W HCPCS: Performed by: THORACIC SURGERY (CARDIOTHORACIC VASCULAR SURGERY)

## 2019-04-20 PROCEDURE — 6370000000 HC RX 637 (ALT 250 FOR IP): Performed by: INTERNAL MEDICINE

## 2019-04-20 PROCEDURE — 92523 SPEECH SOUND LANG COMPREHEN: CPT

## 2019-04-20 PROCEDURE — 85049 AUTOMATED PLATELET COUNT: CPT

## 2019-04-20 PROCEDURE — 6370000000 HC RX 637 (ALT 250 FOR IP): Performed by: NURSE PRACTITIONER

## 2019-04-20 PROCEDURE — 6360000002 HC RX W HCPCS

## 2019-04-20 PROCEDURE — 6360000002 HC RX W HCPCS: Performed by: STUDENT IN AN ORGANIZED HEALTH CARE EDUCATION/TRAINING PROGRAM

## 2019-04-20 PROCEDURE — 99231 SBSQ HOSP IP/OBS SF/LOW 25: CPT | Performed by: NURSE PRACTITIONER

## 2019-04-20 PROCEDURE — 99024 POSTOP FOLLOW-UP VISIT: CPT | Performed by: PHYSICIAN ASSISTANT

## 2019-04-20 PROCEDURE — 2580000003 HC RX 258: Performed by: INTERNAL MEDICINE

## 2019-04-20 PROCEDURE — 94640 AIRWAY INHALATION TREATMENT: CPT

## 2019-04-20 PROCEDURE — 36415 COLL VENOUS BLD VENIPUNCTURE: CPT

## 2019-04-20 PROCEDURE — 6370000000 HC RX 637 (ALT 250 FOR IP): Performed by: FAMILY MEDICINE

## 2019-04-20 PROCEDURE — 2700000000 HC OXYGEN THERAPY PER DAY

## 2019-04-20 PROCEDURE — 6360000002 HC RX W HCPCS: Performed by: NURSE PRACTITIONER

## 2019-04-20 PROCEDURE — 99233 SBSQ HOSP IP/OBS HIGH 50: CPT | Performed by: INTERNAL MEDICINE

## 2019-04-20 PROCEDURE — 85610 PROTHROMBIN TIME: CPT

## 2019-04-20 PROCEDURE — 82947 ASSAY GLUCOSE BLOOD QUANT: CPT

## 2019-04-20 PROCEDURE — 6370000000 HC RX 637 (ALT 250 FOR IP): Performed by: STUDENT IN AN ORGANIZED HEALTH CARE EDUCATION/TRAINING PROGRAM

## 2019-04-20 PROCEDURE — 94762 N-INVAS EAR/PLS OXIMTRY CONT: CPT

## 2019-04-20 PROCEDURE — 2140000001 HC CVICU INTERMEDIATE R&B

## 2019-04-20 PROCEDURE — 97110 THERAPEUTIC EXERCISES: CPT

## 2019-04-20 PROCEDURE — 6360000002 HC RX W HCPCS: Performed by: FAMILY MEDICINE

## 2019-04-20 PROCEDURE — 85027 COMPLETE CBC AUTOMATED: CPT

## 2019-04-20 PROCEDURE — 97164 PT RE-EVAL EST PLAN CARE: CPT

## 2019-04-20 RX ORDER — SPIRONOLACTONE 25 MG/1
25 TABLET ORAL DAILY
Status: DISCONTINUED | OUTPATIENT
Start: 2019-04-20 | End: 2019-04-25 | Stop reason: HOSPADM

## 2019-04-20 RX ORDER — POTASSIUM CHLORIDE 7.45 MG/ML
INJECTION INTRAVENOUS
Status: COMPLETED
Start: 2019-04-20 | End: 2019-04-20

## 2019-04-20 RX ORDER — POTASSIUM CHLORIDE 7.45 MG/ML
10 INJECTION INTRAVENOUS PRN
Status: DISCONTINUED | OUTPATIENT
Start: 2019-04-20 | End: 2019-04-25 | Stop reason: HOSPADM

## 2019-04-20 RX ORDER — DEXTROSE AND SODIUM CHLORIDE 5; .45 G/100ML; G/100ML
INJECTION, SOLUTION INTRAVENOUS CONTINUOUS
Status: DISCONTINUED | OUTPATIENT
Start: 2019-04-20 | End: 2019-04-25 | Stop reason: HOSPADM

## 2019-04-20 RX ADMIN — Medication 10 ML: at 21:55

## 2019-04-20 RX ADMIN — DIPHENHYDRAMINE HCL 25 MG: 25 TABLET ORAL at 23:37

## 2019-04-20 RX ADMIN — OXYCODONE HYDROCHLORIDE AND ACETAMINOPHEN 2 TABLET: 5; 325 TABLET ORAL at 02:13

## 2019-04-20 RX ADMIN — POTASSIUM CHLORIDE 20 MEQ: 1.5 POWDER, FOR SOLUTION ORAL at 21:55

## 2019-04-20 RX ADMIN — FAMOTIDINE 20 MG: 20 TABLET, FILM COATED ORAL at 09:10

## 2019-04-20 RX ADMIN — DOXYCYCLINE HYCLATE 100 MG: 100 TABLET, COATED ORAL at 21:55

## 2019-04-20 RX ADMIN — MULTIPLE VITAMINS W/ MINERALS TAB 1 TABLET: TAB at 09:11

## 2019-04-20 RX ADMIN — Medication 15 ML: at 22:18

## 2019-04-20 RX ADMIN — ALBUTEROL SULFATE 2.5 MG: 2.5 SOLUTION RESPIRATORY (INHALATION) at 20:11

## 2019-04-20 RX ADMIN — ALBUTEROL SULFATE 2.5 MG: 2.5 SOLUTION RESPIRATORY (INHALATION) at 15:43

## 2019-04-20 RX ADMIN — DIPHENHYDRAMINE HCL 25 MG: 25 TABLET ORAL at 02:13

## 2019-04-20 RX ADMIN — ENOXAPARIN SODIUM 40 MG: 40 INJECTION SUBCUTANEOUS at 08:16

## 2019-04-20 RX ADMIN — POTASSIUM CHLORIDE 10 MEQ: 7.46 INJECTION, SOLUTION INTRAVENOUS at 06:10

## 2019-04-20 RX ADMIN — POTASSIUM CHLORIDE 10 MEQ: 7.46 INJECTION, SOLUTION INTRAVENOUS at 08:17

## 2019-04-20 RX ADMIN — POTASSIUM CHLORIDE 20 MEQ: 1.5 POWDER, FOR SOLUTION ORAL at 09:12

## 2019-04-20 RX ADMIN — FUROSEMIDE 40 MG: 10 INJECTION, SOLUTION INTRAMUSCULAR; INTRAVENOUS at 08:16

## 2019-04-20 RX ADMIN — DESMOPRESSIN ACETATE 40 MG: 0.2 TABLET ORAL at 21:55

## 2019-04-20 RX ADMIN — Medication 500000 UNITS: at 18:25

## 2019-04-20 RX ADMIN — SPIRONOLACTONE 25 MG: 25 TABLET ORAL at 10:01

## 2019-04-20 RX ADMIN — METOPROLOL TARTRATE 25 MG: 25 TABLET ORAL at 11:15

## 2019-04-20 RX ADMIN — ALBUTEROL SULFATE 2.5 MG: 2.5 SOLUTION RESPIRATORY (INHALATION) at 08:54

## 2019-04-20 RX ADMIN — FAMOTIDINE 20 MG: 20 TABLET, FILM COATED ORAL at 21:55

## 2019-04-20 RX ADMIN — FUROSEMIDE 40 MG: 10 INJECTION, SOLUTION INTRAMUSCULAR; INTRAVENOUS at 18:25

## 2019-04-20 RX ADMIN — Medication 100 MG: at 09:12

## 2019-04-20 RX ADMIN — METOPROLOL TARTRATE 5 MG: 5 INJECTION, SOLUTION INTRAVENOUS at 10:19

## 2019-04-20 RX ADMIN — LISINOPRIL 5 MG: 5 TABLET ORAL at 09:11

## 2019-04-20 RX ADMIN — INSULIN LISPRO 3 UNITS: 100 INJECTION, SOLUTION INTRAVENOUS; SUBCUTANEOUS at 22:18

## 2019-04-20 RX ADMIN — METOPROLOL TARTRATE 5 MG: 5 INJECTION, SOLUTION INTRAVENOUS at 04:50

## 2019-04-20 RX ADMIN — POTASSIUM CHLORIDE 10 MEQ: 7.46 INJECTION, SOLUTION INTRAVENOUS at 09:27

## 2019-04-20 RX ADMIN — ASPIRIN 81 MG 81 MG: 81 TABLET ORAL at 09:11

## 2019-04-20 RX ADMIN — METOPROLOL TARTRATE 25 MG: 25 TABLET ORAL at 21:55

## 2019-04-20 RX ADMIN — INSULIN GLARGINE 30 UNITS: 100 INJECTION, SOLUTION SUBCUTANEOUS at 09:13

## 2019-04-20 RX ADMIN — CLOPIDOGREL 75 MG: 75 TABLET, FILM COATED ORAL at 09:12

## 2019-04-20 RX ADMIN — OXYCODONE HYDROCHLORIDE AND ACETAMINOPHEN 2 TABLET: 5; 325 TABLET ORAL at 22:18

## 2019-04-20 RX ADMIN — Medication 15 ML: at 09:25

## 2019-04-20 RX ADMIN — ALBUTEROL SULFATE 2.5 MG: 2.5 SOLUTION RESPIRATORY (INHALATION) at 03:55

## 2019-04-20 RX ADMIN — Medication 500000 UNITS: at 09:12

## 2019-04-20 RX ADMIN — DOXYCYCLINE HYCLATE 100 MG: 100 TABLET, COATED ORAL at 09:10

## 2019-04-20 RX ADMIN — INSULIN LISPRO 4 UNITS: 100 INJECTION, SOLUTION INTRAVENOUS; SUBCUTANEOUS at 17:39

## 2019-04-20 RX ADMIN — Medication 500000 UNITS: at 21:55

## 2019-04-20 RX ADMIN — POLYETHYLENE GLYCOL 3350 17 G: 17 POWDER, FOR SOLUTION ORAL at 09:13

## 2019-04-20 RX ADMIN — DEXTROSE AND SODIUM CHLORIDE: 5; 450 INJECTION, SOLUTION INTRAVENOUS at 09:25

## 2019-04-20 ASSESSMENT — PAIN SCALES - GENERAL
PAINLEVEL_OUTOF10: 8
PAINLEVEL_OUTOF10: 7

## 2019-04-20 NOTE — PROGRESS NOTES
Speech Language Pathology  Facility/Department: [facility]  Initial Speech/Language/Cognitive Assessment    NAME: Jennifer Murray  : 1965   MRN: [id number]  ADMISSION DATE: 2019  ADMITTING DIAGNOSIS: has Cardiac arrest ([address] Acute ST elevation myocardial infarction (STEMI) of anterior wall ([address] Severe left ventricular systolic dysfunction; Acute respiratory failure with hypoxemia ([address] Cardiogenic pulmonary edema (HCC); PAD (peripheral artery disease) ([address] Subacute osteomyelitis of right tibia ([address] Exposed orthopaedic hardware ([address] Metabolic encephalopathy; Abnormal CT of the head; Fever chills; Hepatitis; Acute cerebral infarction associated with systemic hypoxia or ischemia; and Multi infarct state on their problem list.    Date of Eval: 2019   Evaluating Therapist: DEVYN Rodgers      Primary Complaint: The patient is a 49 y. o. male who is admitted for  cardiac arrest and STEMI.  Patient was admitted on 19 after he was brought in by EMS for dyspnea, acute respiratory failure.  Patient was placed on CPAP and nebulization treatment.  Initial EKG showed possible anterior septal STEMI.  On [facility] to [facility] emergency room patient had PEA arrest and was resuscitated per ACS protocol with 3 rounds of CPR and epinephrine with successful ROSC.  Patient underwent emergent cardiac cath and showed multivessel CAD with severe LV dysfunction with EF 15% and severe hypokinesis.  Patient had emergent CABG x 4 on 19 with closure of chest wall on 19. .  Patient developed acute kidney injury post-CAB with creatinine peaking at 1.99 which improved after fluid resuscitation and pressor support.  Patient was taken off pressor support.  Patient was asked.  On 4/10/19. Moo Mercado had altered mental status upon weaning from  sedatives .  CT head showed scattered areas of encephalomalacia in bifrontal and left parietal areas consistent with remote infarct and possible subacute infarct in left lower parietal region.  Patient has underlying history of hypertension with non compliance to treatment. Pain:  Pain Assessment  Pain Assessment: Faces  Pain Level: 7  Rodríguez-Baker Pain Rating: No hurt  Response to Pain Intervention: Asleep with RR greater than 10  RASS Score (Ventilated): Alert and calm  Pain Assessment/FLACC  Pain Rating: FLACC (rest) - Face: no particular expression or smile  Pain Rating: FLACC (rest) - Legs: normal position or relaxed  Pain Rating: FLACC (rest) - Activity: lying quietly, normal position, moves easily  Pain Rating: FLACC (rest) - Cry: no cry (awake or asleep)  Pain Rating: FLACC (rest) - Consolability: content, relaxed  Score: FLACC (rest): 0    Assessment: Pt presents with mild cognitive deficits characterized by impaired immediate/delayed recall, word generation, verbal sequencing, thought flexibility and thought insight. Consult with nurse, this may be baseline functioning, also pt was just extubated yesterday and therefore is still lethargic. ST to follow up and provide treatment to address noted deficits. Education provided. ST recommends therapy 1-2 x week at this time. Recommendations:  Requires SLP Intervention: Yes  Duration/Frequency of Treatment: 1-2 x week  D/C Recommendations: Inpatient rehabilitation       Plan:   Goals:  Short-term Goals  Goal 1: Pt will recall 3-5 units with and without distractions in 4/5 opportunities given minimal prompts. Goal 2: Pt will recall 5-6 units given a concrete or an abstract category in 4/5 opportunities given minimal prompts. Goal 3: Pt will complete verbal sequencing tasks with 90% accuracy given minimal prompts. Goal 4: Pt will complete task insight tasks with 90% accuracy given minimal prompts. Goal 5: Pt will complete thought flexibility tasks with 90% accuracy given minimal prompts.    Patient/family involved in developing goals and treatment plan: yes    Subjective:    General  Chart Reviewed: Yes  Family / Caregiver Present: No     Vision  Vision: Within Functional Limits  Hearing  Hearing: Within functional limits           Objective:     Oral/Motor  Oral Motor: Within functional limits              Expression  Primary Mode of Expression: Verbal              Motor Speech  Motor Speech: Within Functional Limits         Cognition:      Orientation  Overall Orientation Status: Within Normal Limits  Attention  Attention: Within Functional Limits  Memory  Memory: Exceptions to [facility]  Short-term Memory: Mild  Working Memory: Mild  Abstract Reasoning  Abstract Reasoning: Exceptions to [facility]  Divergent Thinking: Mild  Safety/Judgement  Safety/Judgement: Exceptions to [facility]  Insight: Mild    Flexibility of Thought: Mild   Word Generation: Mild  Verbal Sequencing: Mild            Prognosis:  Speech Therapy Prognosis  Prognosis: Fair  Individuals consulted  Consulted and agree with results and recommendations: Patient;RN    Education:  Patient Education: yes  Patient Education Response: Verbalizes understanding  Safety Devices in place:  Yes              Therapy Time:   Individual Concurrent Group Co-treatment   Time In [facility]         Time Out 0902         Minutes 30 [place], [place]  4/20/2019 9:21 AM

## 2019-04-20 NOTE — PROGRESS NOTES
[facility]   Progress Note                   Date:   4/20/2019  Patient name: Michelle Gallego  Date of admission:  4/7/2019  4:48 AM  MRN:   [id number]  YOB: 1965  PCP: No primary care provider on file. Reason for Admission: STEMI    Subjective:       S/P emergent CABG, POD#10    PEA Arrest, late presentation of STEMI with emergent bypass. Post op course complicated by neurological insult with multiple areas of ischemic stroke        @ He is awake and alert  Still on high flow oxygen  SBP in 150-160  HR in 80-90        I/O last 3 completed shifts: In: 1059 [P.O.:80; I.V.:728; NG/GT:251]  Out: 3370 [Urine:3370]  No intake/output data recorded.       Medications:   Scheduled Meds:   spironolactone  25 mg Oral Daily    metoprolol tartrate  25 mg Oral BID    insulin glargine  30 Units Subcutaneous Daily    lisinopril  5 mg Oral Daily    furosemide  40 mg Intravenous BID    potassium chloride  20 mEq Oral BID    doxycycline hyclate  100 mg Oral 2 times per day    insulin lispro  0-12 Units Subcutaneous Q6H    famotidine  20 mg Oral BID    enoxaparin  40 mg Subcutaneous Daily    nystatin  5 mL Oral 4x Daily    aspirin  81 mg Oral Daily    docusate  100 mg Oral Daily    dextrose  12.5 g Intravenous Once    albuterol  2.5 mg Nebulization Q6H    sodium chloride flush  10 mL Intravenous 2 times per day    sodium chloride flush  10 mL Intravenous 2 times per day    polyethylene glycol  17 g Oral Daily    chlorhexidine  15 mL Mouth/Throat BID    therapeutic multivitamin-minerals  1 tablet Oral Daily with breakfast    atorvastatin  40 mg Oral Nightly    clopidogrel  75 mg Oral Daily       Continuous Infusions:   dextrose 5 % and 0.45 % NaCl 75 mL/hr at 04/20/19 0925    sodium chloride      niCARdipine      dextrose         CBC:   Recent Labs     04/18/19  0416 04/18/19  1050 04/19/19  0643 04/20/19  0452   WBC 26.6*  --  20.1* 19.5*   HGB 9.1*  --  8.7* 9.4*    392 455* 527*     BMP:    Recent Labs     04/18/19  0416 04/19/19  0643 04/20/19  0452   * 150* 152*   K 3.2* 3.5* 3.1*   * 115* 115*   CO2 25 26 27   BUN 20 24* 24*   CREATININE 0.46* 0.43* 0.50*   GLUCOSE 265* 250* 144*     Hepatic:   Recent Labs     04/19/19  0643   *   *   BILITOT 1.01   ALKPHOS 206*     Troponin: No results for input(s): TROPONINI in the last 72 hours. BNP: No results for input(s): BNP in the last 72 hours. Lipids:   No results for input(s): CHOL, HDL in the last 72 hours. Invalid input(s): LDLCALCU  INR:   Recent Labs     04/18/19  0416 04/19/19  0754 04/20/19  0452   INR 1.2 4.5 1.3       Objective:   Vitals: BP (!) 159/75   Pulse 102   Temp 98.4 °F (36.9 °C) (Oral)   Resp 16   Ht 5' 8\" (1.727 m)   Wt 161 lb 13.1 oz (73.4 kg)   SpO2 94%   BMI 24.60 kg/m²   General appearance: alert and oriented  HEENT: Head: Normocephalic, no lesions,   Bilateral bruist   Neck: no JVD  Lungs:  rales present  Heart: regular rate and rhythm, S1, S2 normal, no murmur, click, rub or gallop  Abdomen: soft, non-tender; bowel sounds normal  Extremities: mild leg edema        EKG: Sinus tachycardia with anteroseptal infarct      Coronary Angiography: 4/7/19  LMCA: has distal 60% stenosis. LAD: has ostial 90% stenosis and long proximal 80% stenosis. D1 has proximal 70% stenosis. LCx: has 20% stenosis. OM1 is very small. OM2 has proximal 90% stenosis. RCA: has proximal 50% stenosis, mid 60% stenosis and distal 80% stenosis. Ramus: has ostial 99% stenosis.     The LV gram was performed in the DUTTON 30 position. LVEF: 15%. LV Wall Motion: severe hypokinesis of the anterior wall and mid inferior wall with apical akinesis. Echo 4/12/19:  Left ventricle is normal in size. Global left ventricular systolic function is mildly reduced. Calculated ejection fraction is 40. Left ventricular wall thickness is mildly increased.  Grade II diastolic dysfunction  Normal

## 2019-04-20 NOTE — PROGRESS NOTES
ENDOVASCULAR NEUROSURGERY PROGRESS NOTE  4/20/2019 11:19 AM  Subjective:   Admit Date: 4/7/2019  PCP: No primary care provider on file. 24 hours events:  Passed swallow eval this am.    Objective:   Vitals: BP (!) 159/75   Pulse 102   Temp 98.4 °F (36.9 °C) (Oral)   Resp 16   Ht 5' 8\" (1.727 m)   Wt 161 lb 13.1 oz (73.4 kg)   SpO2 94%   BMI 24.60 kg/m²     General appearance: Lying in bed, NAD,  Lungs: CTAB  Heart: RRR  Abdomen: soft, NTND, bowel sounds normal    Neuro exam: Follows simple commands, some naming difficulty. CN II-XII: no gross facial assymmetry, pupils 2 mm reactive to light bilaterally, EOMI. Jocelyn spontaneously against gravity in the UEs and at least 2/5 in the LEs.        Medications and labs:   Scheduled Meds:   spironolactone  25 mg Oral Daily    metoprolol tartrate  25 mg Oral BID    insulin glargine  30 Units Subcutaneous Daily    lisinopril  5 mg Oral Daily    furosemide  40 mg Intravenous BID    potassium chloride  20 mEq Oral BID    doxycycline hyclate  100 mg Oral 2 times per day    insulin lispro  0-12 Units Subcutaneous Q6H    famotidine  20 mg Oral BID    enoxaparin  40 mg Subcutaneous Daily    nystatin  5 mL Oral 4x Daily    aspirin  81 mg Oral Daily    docusate  100 mg Oral Daily    dextrose  12.5 g Intravenous Once    albuterol  2.5 mg Nebulization Q6H    sodium chloride flush  10 mL Intravenous 2 times per day    sodium chloride flush  10 mL Intravenous 2 times per day    polyethylene glycol  17 g Oral Daily    chlorhexidine  15 mL Mouth/Throat BID    therapeutic multivitamin-minerals  1 tablet Oral Daily with breakfast    atorvastatin  40 mg Oral Nightly    clopidogrel  75 mg Oral Daily     Continuous Infusions:   dextrose 5 % and 0.45 % NaCl 75 mL/hr at 04/20/19 0925    sodium chloride      niCARdipine      dextrose       CBC:   Recent Labs     04/18/19  0416 04/18/19  1050 04/19/19  0643 04/20/19  0452   WBC 26.6*  --  20.1* 19.5*   HGB 9.1* --  8.7* 9.4*    392 455* 527*     BMP:    Recent Labs     04/18/19  0416 04/19/19  0643 04/20/19  0452   * 150* 152*   K 3.2* 3.5* 3.1*   * 115* 115*   CO2 25 26 27   BUN 20 24* 24*   CREATININE 0.46* 0.43* 0.50*   GLUCOSE 265* 250* 144*     Hepatic:   Recent Labs     04/19/19  0643   *   *   BILITOT 1.01   ALKPHOS 206*     Troponin: No results for input(s): TROPONINI in the last 72 hours. BNP: No results for input(s): BNP in the last 72 hours. Lipids:   No results for input(s): CHOL, HDL in the last 72 hours. Invalid input(s): LDLCALCU  INR:   Recent Labs     04/18/19  0416 04/19/19  0754 04/20/19  0452   INR 1.2 4.5 1.3       Assessment and Recommendations:   46years old male s/p PEA arrest and CABG 4/7, found ot have multiple b/l hemisphere small strokes and cerebral strokes, Carotid U/S with b/l severe stenosis and possible occlusion n the LICA, CTA neck with LICA occlusion and left Vert occlusion and LU 60% stenosis. 1. Continue ASA and Plavix  2. Avoid symptomatic hypotension. 3. Plan for cerebral angiogram with possible LU stenting next week. Date to be determined on Monday.      Kimberly Doran MD  Stroke, [facility]  [address] [place]  Electronically signed 4/20/2019 at 11:19 AM

## 2019-04-20 NOTE — PROGRESS NOTES
[facility] - Progress Note    Today's Date and Time: 4/20/2019, 11:05 AM    Impression :   1. CAD with cardiac arrest x 3. (on the field, in ER and in OR)  2. S/P emergent CABG x 4 on 4-7-19.   3. Open chest post CABG. 4. S/p delayed primary closure of chest incision on 4-8-19  5. Improvement in Poor cardiac function with LVEF 10%-->45%  6. Exposed orthopedic hardware Rt leg  7. S/P removal of Protruding orthopedic nail from Rt lateral knee area. Old blood and malodor. 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 8. S/P ORIF Rt proximal tibia. Non compliant with follow up. 9. Fevers  10. Pulmonary edema  11. ALMITA resolved    Recommendations:     · Discontinued ceftaroline. · Started on doxycycline for chronic leg osteomyelitis  · Patient has chronic infection of leg hardware but this is likely not affecting the major problems which include the cardiac arrests and multiple cerebral and cerebellar infarcts      Medical Decision Making/Summary/Discussion:   · Patient admitted with chest pain and cardiac arrests x 3  · Emergent CABG x 4. S/P sternum closure on 4-8-19  · Found to have protruding Rt tibial orthopedic screw from prior ORIF of tibial fracture  · No overt infection of the leg  · Temp 103 on 4-11-19. Repeat blood cultures: No growth . Sputum with mixed oral tabitha. Vancomycin started. · ALMITA with Creatinine 0.4-->1. 19. Vancomycin d/c. Ceftaroline started. · ALMITA resolved  · HIDA scan ordered for elevated LFTs: Normal on 4-15-19  · MRI with occlusion Lt carotid artery and multiple infracts  · Extubated 4-19-19. More responsive  · Has 2 peripheral IVs, will D/C central line (12 days in)  · Rt groin puncture site had some drainage.  Wound cleansed on 4-19-19  Infection Control Recommendations   · [place] Precautions  · Contact Isolation     Antimicrobial Stewardship Recommendations     · Discontinuation of therapy    Coordination of Outpatient Care: · Estimated Length of IV antimicrobials: D/C 4-11-19  · Patient will need Midline Catheter Insertion:   · Patient will need PICC line Insertion:  · Patient will need: Home IV , [place],  SNF,  LTAC: TBD  · Patient will need outpatient wound care:TBD    Chief complaint/reason for consultation:   · Emergent CABG  · Protruding orthopedic nail from Rt knee      History of Present Illness:   Chayo Moncada is a 46y.o.-year-old  male who was initially admitted on 4/7/2019. Patient seen at the request of Joseph Lopez. INITIAL HISTORY:     Patient apparently developed chest pain and called EMS. Had cardiac arrest at the scene, was resuscitated and brought to ER where he had second cardiac arrest. Was resuscitated once again and taken to OR for emergent CABG x 4. He had another cardiac arrest in OR. Has survived the above and is back in the ICU. Found to have area of malodor on lateral aspect Rt knee, corresponding to a protruding orthopedic screw from a remote ORIF of the Rt  Tibial plateau. The screw has been removed and sent for culture. X rays show healed fracture of the proximal tibia. There is additional hardware in place that will likely need to be removed if patient survives. CURRENT EVALUATION : 4/20/2019    Patient seen and examined this morning. Extubated 4-19-19    Sitting up in chair this morning. Says he is doing well. Remains somewhat confused with inappropriate response. Clinically with much improvement though. Afebrile  VS stable, except HTN    Labs reviewed: 4/20/2019    CT of head 4-13-19 showed scattered infarcts of different ages. MRI follow up 4/14 showed multiple new embolic strokes. Absent flow Lt carotid artery. Ultrasound 4-15-19 with occlusion of Lt internal carotid artery. CXR 4-15-19: Bilateral effusions, parahilar and lower zone airspace opacities. Retrocardiac consolidation. Changes are those of CHF.      Na 149 -> 150 -> 152  K 3.5 -> 3.1  WBCs 17.5 -> 25.1 -> 30 0> 20.1 -> 19.5   Hgb 9.1. -->8.7 -> 9.4     ALMITA has resolved with creatinine of 0.50 today. Echocardiogram showed mildly reduced ejection fraction. No clot visualized. Neuroendovascular surgery is planning on cerebral angiogram with possible stenting next week. Cultures:  Wound:  · Orthopedic screw: Leg screw cultures found. Direct exam showed many gram + cocci in clusters and few gram - rods, NGTD. Lab reports quality of sample is questioned and to interpret results carefully. Blood:  · Blood cultures 4/11 x 2 with no growth to date. Urine:  · Urine culture 4/12 with no growth to date. Sputum  · Sputum culture 4/12 with mixed bacterial morphotypes. Discussed with RN, family. I have personally reviewed the past medical history, past surgical history, medications, social history, and family history, and I have updated the database accordingly.   Past Medical History:     Past Medical History:   Diagnosis Date    CAD (coronary artery disease) 04/07/2019    Cardiac arrest ([facility] [address] 04/07/2019    Osteolysis 04/07/2019    R Knee hardware    PEA (Pulseless electrical activity) ([facility] [address] 04/07/2019    PVD (peripheral vascular disease) ([facility] [address] 04/07/2019       Past Surgical  History:     Past Surgical History:   Procedure Laterality Date    CARDIAC CATHETERIZATION  04/07/2019    MVD-Stv    CORONARY ARTERY BYPASS GRAFT  04/07/2019    x4 per Dr. Greer Peña, [facility] GRAFT N/A 4/7/2019    EMERGENT CPR,  CORONARY ARTERY BYPASS X4, ON PUMP ,CHEST LEFT OPEN; SWAN, ANGELA PER ANESTHESIA performed by Bobbi Cruz MD at [address] [place] [address],[address]      unsure if correct known tibial surgery    HARDWARE REMOVAL Right 04/07/2019    R Knee internal screw protruding through skin    RECONSTRUCTIVE REPAIR STERNAL N/A 4/8/2019    STERNAL  WOUND WASHOUT AND CLOSURE S/P CABG performed by Bobbi Cruz MD at [address] [facility] Right     [address] off [address], external fixation and internal hardware    TONSILLECTOMY         Medications:      spironolactone  25 mg Oral Daily    metoprolol tartrate  25 mg Oral BID    insulin glargine  30 Units Subcutaneous Daily    lisinopril  5 mg Oral Daily    furosemide  40 mg Intravenous BID    potassium chloride  20 mEq Oral BID    doxycycline hyclate  100 mg Oral 2 times per day    insulin lispro  0-12 Units Subcutaneous Q6H    famotidine  20 mg Oral BID    enoxaparin  40 mg Subcutaneous Daily    nystatin  5 mL Oral 4x Daily    aspirin  81 mg Oral Daily    docusate  100 mg Oral Daily    dextrose  12.5 g Intravenous Once    albuterol  2.5 mg Nebulization Q6H    sodium chloride flush  10 mL Intravenous 2 times per day    sodium chloride flush  10 mL Intravenous 2 times per day    polyethylene glycol  17 g Oral Daily    chlorhexidine  15 mL Mouth/Throat BID    therapeutic multivitamin-minerals  1 tablet Oral Daily with breakfast    atorvastatin  40 mg Oral Nightly    clopidogrel  75 mg Oral Daily       Social History:     Social History     Socioeconomic History    Marital status: Unknown     Spouse name: Not on file    Number of children: Not on file    Years of education: Not on file    Highest education level: Not on file   Occupational History    Not on file   Social Needs    Financial resource strain: Not on file    Food insecurity:     Worry: Not on file     Inability: Not on file    Transportation needs:     Medical: Not on file     Non-medical: Not on file   Tobacco Use    Smoking status: Unknown If Ever Smoked   Substance and Sexual Activity    Alcohol use: Not on file    Drug use: Not on file    Sexual activity: Not on file   Lifestyle    Physical activity:     Days per week: Not on file     Minutes per session: Not on file    Stress: Not on file   Relationships    Social connections:     Talks on phone: Not on file     Gets together: Not on file     Attends Nondenominational service: Not on file Active member of club or organization: Not on file     Attends meetings of clubs or organizations: Not on file     Relationship status: Not on file    Intimate partner violence:     Fear of current or ex partner: Not on file     Emotionally abused: Not on file     Physically abused: Not on file     Forced sexual activity: Not on file   Other Topics Concern    Not on file   Social History Narrative    Not on file       Family History:   History reviewed. No pertinent family history. Allergies:   Patient has no allergy information on record. Review of Systems:   Unable to provide. Sedated on ventilator. Physical Examination :     Patient Vitals for the past 8 hrs:   BP Temp Temp src Pulse Resp SpO2 Weight   04/20/19 0854 -- -- -- 102 16 94 % --   04/20/19 0800 (!) 159/75 98.4 °F (36.9 °C) Oral 97 20 93 % --   04/20/19 0600 (!) 145/65 -- -- 86 -- 96 % 161 lb 13.1 oz (73.4 kg)   04/20/19 0500 (!) 147/71 -- -- 83 -- 97 % --   04/20/19 0400 (!) 153/74 -- -- 93 -- 98 % --   04/20/19 0356 -- -- -- -- -- 96 % --     General Appearance: Extubated  Head:  Normocephalic, no trauma  Eyes: Pupils equal, round, reactive to light; sclera anicteric; conjunctivae pink. No embolic phenomena. ENT: Oropharynx clear, without erythema, exudate, or thrush. No tenderness of sinuses. Mouth/throat: mucosa pink and moist. No lesions. Dentition in good repair. Neck:Supple, without lymphadenopathy. Thyroid normal, No bruits. Pulmonary/Chest: Coarse sounds  Cardiovascular: Regular rate and rhythm without murmurs, rubs, or gallops. Abdomen: Soft, non tender. Bowel sounds normal. No organomegaly  All four Extremities: No cyanosis, clubbing, edema, or effusions. Neurologic: Intermittently awake, responds appropriately at times. Skin: Warm and dry with good turgor. Signs of peripheral arterial insufficiency. Rt lateral knee with open incision. Chest incision closed.     Medical Decision Making -Laboratory:   I have independently reviewed/ordered the following labs:    CBC with Differential:   Recent Labs     04/19/19  0643 04/20/19  0452   WBC 20.1* 19.5*   HGB 8.7* 9.4*   HCT 28.1* 32.2*   * 527*     BMP:   Recent Labs     04/19/19  0643 04/20/19  0452   * 152*   K 3.5* 3.1*   * 115*   CO2 26 27   BUN 24* 24*   CREATININE 0.43* 0.50*   MG 2.0 2.1     Hepatic Function Panel:   Recent Labs     04/19/19 0643   PROT 6.3*   LABALBU 2.6*   BILIDIR 0.55*   IBILI 0.46   BILITOT 1.01   ALKPHOS 206*   *   *     No results for input(s): RPR in the last 72 hours. No results for input(s): HIV in the last 72 hours. No results for input(s): BC in the last 72 hours. Lab Results   Component Value Date    MUCUS NOT REPORTED 04/14/2019    RBC 3.23 04/20/2019    TRICHOMONAS NOT REPORTED 04/14/2019    WBC 19.5 04/20/2019    YEAST NOT REPORTED 04/14/2019    TURBIDITY CLOUDY 04/14/2019     Lab Results   Component Value Date    CREATININE 0.50 04/20/2019    GLUCOSE 144 04/20/2019       Medical Decision Making-Imaging:     EXAMINATION:   3 XRAY VIEWS OF THE RIGHT KNEE       4/7/2019 5:00 pm       COMPARISON:   04/07/2019       HISTORY:   ORDERING SYSTEM PROVIDED HISTORY: s/p Hardware removal at bedside   TECHNOLOGIST PROVIDED HISTORY:   s/p Hardware removal at bedside       FINDINGS:   Interval removal of anterior superior fixating screw.  No periprosthetic   fracture.  Chronic deformity of the proximal fibula.  Degenerative changes of   knee and tib-fib syndesmosis.         Impression   Removal of anterior-superior fixating screw without interval change otherwise     EXAMINATION:   3 XRAY VIEWS OF THE RIGHT KNEE       4/7/2019 3:28 pm       COMPARISON:   None.       HISTORY:   ORDERING SYSTEM PROVIDED HISTORY: Trauma/Fracture   TECHNOLOGIST PROVIDED HISTORY:   Trauma/Fracture       FINDINGS:   Sideplate and 4 screws transfix the proximal tibia laterally.  Old healed   fracture proximal fibular neck and head.

## 2019-04-20 NOTE — PROGRESS NOTES
Neurology Nurse Practitioner Progress Note      INTERVAL HISTORY: This is a 46 y.o.  male admitted 4/7/2019 for Cardiac arrest ([facility] [address] [I46.9]. This is a follow-up neurology progress note. The patient was examined and the chart was reviewed. Discussed with the pt & RN. No acute events overnight. No c/o. HPI: Wale Muse is a 46 y.o. male with H/O HTN, DM, PAD, prior head trauma (fall), osteolysis, who was admitted 4/7/2019 with Cardiac arrest ([facility] [address] [I46.9]. According to the medical records, pt called EMS due to worsening shortness of breath. He had audible rales & was placed on C-PAP; breathing treatment was given along with Solu-Medrol 125 mg IV &  mg. His EKG was concerning for STEMI. Pt went into cardiac arrest as he was bring brought into the ED. EMS started CPR & he was placed on defibrillator. He was in PEA arrest. Got intubated & got IV Heparin bolus. He underwent urgent cardiac cath & found to have multivessel CAD with severe LV systolic dysfunction; occluded L external iliac artery & severe diffuse disease of R iliac artery. Later, he was taken to OR where he arrested again. He underwent emergent CABG x 4 (4/7). Got admitted in cardiac ICU on high dose vasopressors. His EF improved from 15% to 40% post-surgery. Found out to have chronic infection RLE s/p R proximal tibial ORIF with exposed hardware (placed in 2005). Nail was removed & sent for Cx. Pt was started on vancomycin initially; later, got switched to Teflaro. Pt developed ischemic hepatitis & ALMITA post-procedure. Reportedly, pt was able to follow simple commands, moving all limbs, trying to pull at his tubes on 4/10. Sedation was increased due to the agitation. Later, he was weaned off; Precedex & Versed drips have been off since the morning of 4/11. It was noticed that pt stopped responding to commands with no spontaneous movements reported. He had brainstem reflexes. Neurology was consulted on 4/12.          spironolactone  25 mg Oral Daily    metoprolol tartrate  25 mg Oral BID    insulin glargine  30 Units Subcutaneous Daily    lisinopril  5 mg Oral Daily    furosemide  40 mg Intravenous BID    potassium chloride  20 mEq Oral BID    doxycycline hyclate  100 mg Oral 2 times per day    insulin lispro  0-12 Units Subcutaneous Q6H    famotidine  20 mg Oral BID    enoxaparin  40 mg Subcutaneous Daily    nystatin  5 mL Oral 4x Daily    aspirin  81 mg Oral Daily    docusate  100 mg Oral Daily    dextrose  12.5 g Intravenous Once    albuterol  2.5 mg Nebulization Q6H    sodium chloride flush  10 mL Intravenous 2 times per day    sodium chloride flush  10 mL Intravenous 2 times per day    polyethylene glycol  17 g Oral Daily    chlorhexidine  15 mL Mouth/Throat BID    therapeutic multivitamin-minerals  1 tablet Oral Daily with breakfast    atorvastatin  40 mg Oral Nightly    clopidogrel  75 mg Oral Daily       Past Medical History:   Diagnosis Date    CAD (coronary artery disease) 04/07/2019    Cardiac arrest ([facility] [address] 04/07/2019    Osteolysis 04/07/2019    R Knee hardware    [facility]    PVD (peripheral vascular disease) [facility] 04/07/2019       Past Surgical History:   Procedure Laterality Date    CARDIAC CATHETERIZATION  04/07/2019    MVD-Stv    CORONARY ARTERY BYPASS GRAFT  04/07/2019    x4 per Dr. Lawson Felty, [address] [place] [address] GRAFT N/A 4/7/2019    EMERGENT CPR,  CORONARY ARTERY BYPASS X4, ON PUMP ,CHEST LEFT OPEN; SWAN, ANGELA PER ANESTHESIA performed by Tamiko Wheatley MD at [address] [place] [address],[address]      unsure if correct known tibial surgery    HARDWARE REMOVAL Right 04/07/2019    R Knee internal screw protruding through skin    RECONSTRUCTIVE REPAIR STERNAL N/A 4/8/2019    STERNAL  WOUND WASHOUT AND CLOSURE S/P CABG performed by Tamiko Wheatley MD at [facility] off roof, external fixation and internal hardware    TONSILLECTOMY         PHYSICAL EXAM:      Blood pressure (!) 159/75, pulse 102, temperature 98.4 °F (36.9 °C), temperature source Oral, resp. rate 16, height 5' 8\" (1.727 m), weight 161 lb 13.1 oz (73.4 kg), SpO2 94 %. Limited Neurological Examination:  Pt was alert, awake & oriented, other than thought it was March of 2016  Speech much clear today; no language issues; no hallucinations or delusions  He followed simple commands  CN II-XII - grossly intact  Moving/lifting all limbs, >RLE due to pain/infection  Sensations - grossly intact  No visible tremors  UEs DTRs & L knee reflex - intact; no ankle clonus  R plantar - equivocal       DATA      Lab Results   Component Value Date    WBC 19.5 (H) 04/20/2019    HGB 9.4 (L) 04/20/2019    HCT 32.2 (L) 04/20/2019     (H) 04/20/2019    CHOL 56 04/17/2019    TRIG 107 04/17/2019    HDL 13 (L) 04/17/2019     (H) 04/19/2019     (H) 04/19/2019     (H) 04/20/2019    K 3.1 (L) 04/20/2019     (H) 04/20/2019    CREATININE 0.50 (L) 04/20/2019    BUN 24 (H) 04/20/2019    CO2 27 04/20/2019    INR 1.3 04/20/2019    [id number] 1994 (H) 04/17/2019    FOLATE 17.6 04/17/2019    LABA1C 10.0 (H) 04/17/2019           CT HEAD (4/12/2019):   Scattered areas of encephalomalacia, bifrontal & high L parietal areas; remote infarcts. Subacute infarct in the L lower parietal region? Dystrophic calcification in L frontal lobe? Chronic microvascular changes. MRI BRAIN (4/14/2019):   Multiple acute ischemic infarcts in the R brachium pontis, R cerebellum & b/l cerebral white matter. Central embolic etiology is suggested. No hemorrhage. B/L anterior frontal & high L frontoparietal encephalomalacia; prior insult. Absent flow void in the L ICA consistent with high-grade proximal stenosis vs occlusion. CTA HEAD & NECK (4/18/2019): Occlusion L ICA extending to level skull base.  L vertebral artery occluded with reconstitution L V4 segment. L petrous & cavernous segments are occluded. R ICA - 60% stenosis. Moderate atherosclerotic changes involving the remainder of the cervical arterial vasculature. L ostium - 60-70% focal stenosis with reconstitution L carotid terminus. ECHO (4/7/2019): EF 15%. Severe hypokinesis of anterior LV wall & mid inferior wall with apical akinesis      REPEAT ECHO (4/122019): EF 40%. Grade II diastolic dysfunction. LV wall thickness is mildly increased       EEG (4/12/2019): Moderate diffuse encephalopathy                          IMPRESSION & PLAN: 46 y.o.  male admitted with  Encephalopathy; multifactorial including s/p cardiac arrest x 2, sepsis, multi-infarct state, ALMITA & ischemic hepatitis. Pt alert & oriented other than month & year; following simple commands; moving/lifting all limbs>RLE. Passed swallow study     PEA arrest x 2; multivessel CAD; s/p emergent CABG x 4 (4/7/19)    Multi-infarct state; normalize BP; continue ASA 81 mg QD, Plavix 75 mg QD & Lipitor 40 mg HS    CTA head & neck - L ICA & L vertebral artery - occluded; R ICA 60% stenosis; endovascular planing for angiogram & possible R ICA stenting next week    Febrile; osteomyelitis RLE s/p R proximal tibial ORIF with exposed hardware (placed in 2005); non-compliant; is on doxycycline as per ID team    Ischemic hepatitis; ALMITA    Continue PT/OT; needs moderate assistance    Comorbid conditions - HTN, DM, prior head trauma, PAD, osteolysis    We will sign off at this time. Please, call with any questions or concerns.  Thank you

## 2019-04-20 NOTE — PROGRESS NOTES
with non compliance to treatment.      Patient unable  To provide history as he is intubated . Has not been following up with providers for care . Patient also has wound in R knee and discharge . H/o Orthopedic surgery . Patient tolerating tube feeds     Review of Systems:     Had fever last night as described  Denies any visual or hearing disturbances  No worsened SOB or coughing reported  Denies any abdominal pain, nausea, vomiting, diarrhea  No skin rashes reported    Medications:      Allergies:  Not on File    Current Meds:   Scheduled Meds:    spironolactone  25 mg Oral Daily    insulin glargine  30 Units Subcutaneous Daily    metoprolol  5 mg Intravenous Q6H    lisinopril  5 mg Oral Daily    furosemide  40 mg Intravenous BID    potassium chloride  20 mEq Oral BID    doxycycline hyclate  100 mg Oral 2 times per day    insulin lispro  0-12 Units Subcutaneous Q6H    famotidine  20 mg Oral BID    enoxaparin  40 mg Subcutaneous Daily    nystatin  5 mL Oral 4x Daily    aspirin  81 mg Oral Daily    docusate  100 mg Oral Daily    dextrose  12.5 g Intravenous Once    albuterol  2.5 mg Nebulization Q6H    sodium chloride flush  10 mL Intravenous 2 times per day    sodium chloride flush  10 mL Intravenous 2 times per day    polyethylene glycol  17 g Oral Daily    chlorhexidine  15 mL Mouth/Throat BID    therapeutic multivitamin-minerals  1 tablet Oral Daily with breakfast    atorvastatin  40 mg Oral Nightly    clopidogrel  75 mg Oral Daily     Continuous Infusions:    dextrose 5 % and 0.45 % NaCl 75 mL/hr at 04/20/19 0925    sodium chloride      niCARdipine      dextrose       PRN Meds: potassium chloride, potassium chloride, magnesium hydroxide, fentanNYL, sodium chloride flush, labetalol, albuterol, sodium chloride flush, sodium chloride flush, calcium chloride IVPB, magnesium sulfate, potassium chloride, acetaminophen, acetaminophen, oxyCODONE-acetaminophen **OR** oxyCODONE-acetaminophen, Problem  Cardiac arrest [facility])    Active Hospital Problems    Diagnosis Date Noted    Acute cerebral infarction associated with systemic hypoxia or ischemia [I63.89]     Multi infarct state [I69.30]     Fever chills [R50.9]     Hepatitis [Q42.2]     Metabolic encephalopathy [R90.82]     Abnormal CT of the head [R93.0]     Cardiac arrest ([address] [I46.9] 04/07/2019    Severe left ventricular systolic dysfunction [R90.8] 04/07/2019    Acute respiratory failure with hypoxemia ([facility]) [J96.01] 04/07/2019    Cardiogenic pulmonary edema ([address] [I50.1] 04/07/2019    PAD (peripheral artery disease) ([facility]) [I73.9] 04/07/2019    Acute ST elevation myocardial infarction (STEMI) of anterior wall ([facility]) [I21.09]     Subacute osteomyelitis of right tibia ([facility] [A06.860]     Exposed orthopaedic hardware ([facility] [T14.836T]      Plan:        - Cardiac arrest: PEA needed 3 rounds of chest compressions  - Multivessel CAD, status post CABG now  - Encephalopathy, significant, EEG with no definite seizures. MRI with multiple small strokes in cerebral and cerebellar areas. Status post extubation, confusion improved now  - CVA, multiple, small, bilateral cerebral area and left cerebellum area. NO regional large infarcts, continued close follow up of clinical status, improving overall  - Carotid stenosis, bilateral seems worse on left side. CT angiogram with left sided ICA stenosis  - Hypertension, on multiple medications, change lopressor to oral form, continue lisinopril, lasix and aldactone  - Acute respiratory failure, status post extubation now  - Diabetes, poorly controlled.  lantus continued along with sliding scale now  - Fevers with underlying chronic osteomyelitis, on doxycycline         Jefry Wallis MD  4/20/2019  10:09 AM

## 2019-04-20 NOTE — PROGRESS NOTES
INTENSIVE CARE UNIT  Resident Physician Progress Note    Patient - Karrie Zapata  Date of Admission -  2019  4:48 AM  Date of Evaluation -  2019  Room and Bed Number -  1012/1012-01   Hospital Day - 13      SUBJECTIVE:     OVERNIGHT EVENTS:             Patient tolerated extubation. Denied any shortness of breath or wheezing. AWAKE & FOLLOWING COMMANDS:  [] No   [x] Yes     SECRETIONS Amount:  [x] Small [] Moderate  [] Large  [] None  Color:     [x] White [] Colored  [] Bloody    SEDATION:  RAAS Score:  [] Propofol gtt  [] Versed gtt  [] Ativan gtt   [x] No Sedation . PARALYZED:  [] No    [] Yes    VASOPRESSORS:  [x] No    [] Yes  [] Levophed [] Dopamine [] Vasopressin  [] Dobutamine [] Phenylephrine [] Epinephrine      OBJECTIVE:     VITAL SIGNS:  BP (!) 166/73   Pulse 89   Temp 98.6 °F (37 °C) (Oral)   Resp 18   Ht 5' 8\" (1.727 m)   Wt 161 lb 13.1 oz (73.4 kg)   SpO2 96%   BMI 24.60 kg/m²   Tmax over 24 hours:  Temp (24hrs), Av.2 °F (36.8 °C), Min:97.7 °F (36.5 °C), Max:98.6 °F (37 °C)      Patient Vitals for the past 8 hrs:   BP Temp Temp src Pulse Resp SpO2   19 1630 (!) 166/73 98.6 °F (37 °C) Oral 89 18 96 %   19 1200 (!) 150/70 98.1 °F (36.7 °C) Oral 94 14 93 %         Intake/Output Summary (Last 24 hours) at 2019 1845  Last data filed at 2019 1752  Gross per 24 hour   Intake 2017 ml   Output 3150 ml   Net -1133 ml     Date 19 0000 - 19 2359   Shift 0756-1177 9201-9682 0136-4959 24 Hour Total   INTAKE   P.O.(mL/kg/hr)   650 650   I. V.(mL/kg) 530(7.2)  837(11.4) 1986(96.8)   Shift Total(mL/kg) 530(7.2)  1520(22.7) 0603(12.9)   OUTPUT   Urine(mL/kg/hr) 1250(2.1) 1400(2.4) 500 3150   Shift Total(mL/kg) 1250(17) 1400(19.1) 500(6.8) 3150(42.9)   Weight (kg) 73.4 73.4 73.4 73.4     Wt Readings from Last 3 Encounters:   19 161 lb 13.1 oz (73.4 kg)     Body mass index is 24.6 kg/m².         PHYSICAL EXAM:  Constitutional: Appears well, no distress  EENT: PERRLA,  sclera clear, anicteric, oropharynx clear, no lesions, neck supple with midline trachea. Neck: Supple, symmetrical, trachea midline, no adenopathy, no jvd skin normal  Respiratory: clear to auscultation, no wheezes or rales and unlabored breathing. No intercostal tenderness.   Had course breath sounds bilaterally  Cardiovascular: regular rate and rhythm, normal S1, S2, no murmur noted and 2+ pulses throughout  Abdomen: soft, nontender, nondistended, no masses or organomegaly  Extremities:  peripheral pulses normal, has bilateral pedal edema, no clubbing or cyanosis  MEDICATIONS:  Scheduled Meds:   spironolactone  25 mg Oral Daily    metoprolol tartrate  25 mg Oral BID    insulin lispro  0-12 Units Subcutaneous TID WC    insulin lispro  0-6 Units Subcutaneous Nightly    insulin glargine  30 Units Subcutaneous Daily    lisinopril  5 mg Oral Daily    furosemide  40 mg Intravenous BID    potassium chloride  20 mEq Oral BID    doxycycline hyclate  100 mg Oral 2 times per day    famotidine  20 mg Oral BID    enoxaparin  40 mg Subcutaneous Daily    nystatin  5 mL Oral 4x Daily    aspirin  81 mg Oral Daily    docusate  100 mg Oral Daily    dextrose  12.5 g Intravenous Once    albuterol  2.5 mg Nebulization Q6H    sodium chloride flush  10 mL Intravenous 2 times per day    sodium chloride flush  10 mL Intravenous 2 times per day    polyethylene glycol  17 g Oral Daily    chlorhexidine  15 mL Mouth/Throat BID    therapeutic multivitamin-minerals  1 tablet Oral Daily with breakfast    atorvastatin  40 mg Oral Nightly    clopidogrel  75 mg Oral Daily     Continuous Infusions:   dextrose 5 % and 0.45 % NaCl 75 mL/hr at 04/20/19 0925    sodium chloride      niCARdipine      dextrose       PRN Meds:     potassium chloride 10 mEq PRN   potassium chloride 10 mEq PRN   magnesium hydroxide 30 mL Daily PRN   fentanNYL 25 mcg Q1H PRN   sodium chloride flush 10 mL PRN   labetalol 10 mg Q6H PRN albuterol 2.5 mg Q6H PRN   sodium chloride flush 10 mL PRN   sodium chloride flush 10 mL PRN   calcium chloride IVPB 1 g PRN   magnesium sulfate 1 g PRN   potassium chloride 20 mEq PRN   acetaminophen 650 mg Q4H PRN   acetaminophen 650 mg Q4H PRN   oxyCODONE-acetaminophen 1 tablet Q4H PRN   Or     oxyCODONE-acetaminophen 2 tablet Q4H PRN   diphenhydrAMINE 25 mg Nightly PRN   bisacodyl 10 mg Daily PRN   ondansetron 4 mg Q8H PRN   hydrALAZINE 5 mg Q5 Min PRN   albumin human 25 g PRN   glucose 15 g PRN   dextrose 12.5 g PRN   glucagon (rDNA) 1 mg PRN   dextrose 100 mL/hr PRN       SUPPORT DEVICES: [] Ventilator [] BIPAP  [x] Nasal Cannula [] Room Air    On 5 L oxygen by nasal cannula    DATA:  Complete Blood Count:   Recent Labs     04/18/19 0416 04/19/19 0643 04/20/19 0452 04/20/19  1650   WBC 26.6*  --  20.1* 19.5*  --    RBC 3.06*  --  2.90* 3.23*  --    HGB 9.1*  --  8.7* 9.4*  --    HCT 30.8*  --  28.1* 32.2*  --    .7  --  96.9 99.7  --    MCH 29.7  --  30.0 29.1  --    MCHC 29.5  --  31.0 29.2  --    RDW 14.6*  --  14.6* 14.4  --       < > 455* 527* 509*   MPV 12.0  --  11.9 12.0  --     < > = values in this interval not displayed.         Last 3 Blood Glucose:   Recent Labs     04/18/19 0416 04/19/19 0643 04/20/19 0452   GLUCOSE 265* 250* 144*        PT/INR:    Lab Results   Component Value Date    PROTIME 13.5 04/20/2019    INR 1.3 04/20/2019     PTT:    Lab Results   Component Value Date    APTT 25.7 04/07/2019       Comprehensive Metabolic Profile:   Recent Labs     04/18/19 0416 04/19/19 0643 04/20/19 0452   * 150* 152*   K 3.2* 3.5* 3.1*   * 115* 115*   CO2 25 26 27   BUN 20 24* 24*   CREATININE 0.46* 0.43* 0.50*   GLUCOSE 265* 250* 144*   CALCIUM 8.6 8.6 8.4*   PROT  --  6.3*  --    LABALBU  --  2.6*  --    BILITOT  --  1.01  --    ALKPHOS  --  206*  --    AST  --  110*  --    ALT  --  388*  --       Magnesium:   Lab Results   Component Value Date    MG 2.1 04/20/2019 MG 2.0 04/19/2019    MG 1.9 04/18/2019     Phosphorus: No results found for: PHOS  Ionized Calcium:   Lab Results   Component Value Date    CAION 0.97 04/09/2019    CAION 1.01 04/07/2019        Urinalysis:   Lab Results   Component Value Date    NITRU NEGATIVE 04/14/2019    COLORU DARK YELLOW 04/14/2019    PHUR 5.0 04/14/2019    WBCUA 0 TO 2 04/14/2019    RBCUA 2 TO 5 04/14/2019    MUCUS NOT REPORTED 04/14/2019    TRICHOMONAS NOT REPORTED 04/14/2019    YEAST NOT REPORTED 04/14/2019    BACTERIA NOT REPORTED 04/14/2019    SPECGRAV 1.024 04/14/2019    LEUKOCYTESUR NEGATIVE 04/14/2019    UROBILINOGEN Normal 04/14/2019    BILIRUBINUR NEGATIVE 04/14/2019    GLUCOSEU 3+ 04/14/2019    KETUA NEGATIVE 04/14/2019    AMORPHOUS NOT REPORTED 04/14/2019       HgBA1c:    Lab Results   Component Value Date    LABA1C 10.0 04/17/2019     TSH:  No results found for: TSH    Lactic Acid: No results found for: LACTA   Troponin: No results for input(s): TROPONINI in the last 72 hours. Microbiology:      Other Labs:      Radiology/Imaging:  None today   ASSESSMENT:     Principal Problem:    Cardiac arrest [facility])  Active Problems:    Acute ST elevation myocardial infarction (STEMI) of anterior wall (HCC)    Severe left ventricular systolic dysfunction    Acute respiratory failure with hypoxemia (HCC)    Cardiogenic pulmonary edema (HCC)    PAD (peripheral artery disease) (HCC)    Subacute osteomyelitis of right tibia (HCC)    Exposed orthopaedic hardware ([address]    Metabolic encephalopathy    Abnormal CT of the head    Fever chills    Hepatitis    Acute cerebral infarction associated with systemic hypoxia or ischemia    Multi infarct state    Carotid stenosis, bilateral  Resolved Problems:    * No resolved hospital problems. *  Acute kidney injury, resolved. CVA. Toxic metabolic encephalopathy,improved   Leukocytosis,, stable   Hyperglycemia secondary to stress.   Anemia,better   Acute respiratory and metabolic alkalosis. Hypernatremia., Slightly worse. Hypokalemia   Acute systolic congestive heart failure      PLAN:     WEAN PER PROTOCOL:  [x] No   [] Yes  [x] N/A    DISCONTINUE ANY LABS:   [] No   [] Yes    ICU PROPHYLAXIS:  Stress ulcer:  [] PPI Agent  [x] W6Vtlds [] Sucralfate  [] Other:  VTE:   [x] Enoxaparin  [] Unfract. Heparin Subcut  [] EPC Cuffs    NUTRITION:  [] NPO [x] Tube Feeding  [] TPN  [] PO    HOME MEDICATIONS RECONCILED: [] No  [] Yes    INSULIN DRIP:   [x] No   [] Yes    CONSULTATION NEEDED:  [] No   [] Yes    FAMILY UPDATED:    [] No   [] Yes    TRANSFER OUT OF ICU:   [] No   [x] Yes    ADDITIONAL PLAN:  Continue nasal cannula oxygen supplementation. Patient was informed of the need for using oxygen. Patient was educated on the importance of compliance and the benefits of oxygen use. Complications of oxygen use, including dryness of the nostrils, epistaxis and also the fire hazard were explained to the patient. Patient willingly accepts to use  the oxygen as recommended  -Patient is on Lasix  -On bronchodilators. Patient is on doxycycline        Please note that this chart was generated using voice recognition dictation software. Although every effort was made to ensure the accuracy of this automated transcription, some errors in transcription may have occurred.          Niki Mora MD  4/20/2019 6:45 PM

## 2019-04-20 NOTE — PROGRESS NOTES
[id number] [facility]  Daily Progress Note    Surgeon:  Dr. Jose Gibbs  S/P :  Emerg Coronary artery bypass 4/7/19  Chest closure 4/8/19  EF: 45 %    Subjective:  Mr. Aileen Howard extubated yesterday. Physical Exam  Vital Signs: BP (!) 145/65   Pulse 86   Temp 98.1 °F (36.7 °C) (Oral)   Resp 20   Ht 5' 8\" (1.727 m)   Wt 161 lb 13.1 oz (73.4 kg)   SpO2 96%   BMI 24.60 kg/m²  O2 Flow Rate (L/min): 5 L/min   Admit Weight: Weight: 176 lb 5.9 oz (80 kg)   WEIGHTWeight: 161 lb 13.1 oz (73.4 kg)     General: in no acute distress. Resting in bed  Heart:Normal S1 and S2.  Regular rhythm. No murmurs, gallops, or rubs. Pacing Wires Yes   Lungs: clear to auscultation bilaterally  Abdomen: soft, non tender, non distended, BSx4  Extremities: negative  Wounds: clean and dry, healing appropriately. Pravena to left leg.      Scheduled Meds:    insulin glargine  30 Units Subcutaneous Daily    metoprolol  5 mg Intravenous Q6H    lisinopril  5 mg Oral Daily    furosemide  40 mg Intravenous BID    potassium chloride  20 mEq Oral BID    doxycycline hyclate  100 mg Oral 2 times per day    insulin lispro  0-12 Units Subcutaneous Q6H    famotidine  20 mg Oral BID    enoxaparin  40 mg Subcutaneous Daily    nystatin  5 mL Oral 4x Daily    aspirin  81 mg Oral Daily    docusate  100 mg Oral Daily    dextrose  12.5 g Intravenous Once    albuterol  2.5 mg Nebulization Q6H    sodium chloride flush  10 mL Intravenous 2 times per day    sodium chloride flush  10 mL Intravenous 2 times per day    polyethylene glycol  17 g Oral Daily    chlorhexidine  15 mL Mouth/Throat BID    therapeutic multivitamin-minerals  1 tablet Oral Daily with breakfast    atorvastatin  40 mg Oral Nightly    clopidogrel  75 mg Oral Daily     Continuous Infusions:    sodium chloride      niCARdipine      dextrose         Data:  CBC:   Recent Labs     04/18/19  0416 04/18/19  1050 04/19/19  0643 04/20/19  0452   WBC 26.6*  --  20.1* 19.5*   HGB 9.1*  --  8.7* 9.4*   HCT 30.8*  --  28.1* 32.2*   .7  --  96.9 99.7    392 455* 527*     BMP:   Recent Labs     04/18/19  0416 04/19/19  0643 04/20/19  0452   * 150* 152*   K 3.2* 3.5* 3.1*   * 115* 115*   CO2 25 26 27   BUN 20 24* 24*   CREATININE 0.46* 0.43* 0.50*     PT/INR:   Recent Labs     04/18/19  0416 04/19/19  0754 04/20/19  0452   PROTIME 12.3* 42.0* 13.5*   INR 1.2 4.5 1.3     APTT: No results for input(s): APTT in the last 72 hours. Chest X-Ray: 4/17/19:   ET tube in good position.  Enteric tube passes beneath the diaphragm.  Intact   sternotomy wires.  Cardiac leads overlie the chest.  Enlarged heart with   moderate pulmonary edema.  Bilateral pleural effusions.  No pneumothorax.           Impression   Stable moderate pulmonary edema and effusions.       Stable supporting devices. I/O:  I/O last 3 completed shifts: In: 1059 [P.O.:80; I.V.:728; NG/GT:251]  Out: 3370 [Urine:3370]      Assessment & Plan:   Patient Active Problem List   Diagnosis    Card[facility]    Acute ST elevation myocardial infarction (STEMI) of anterior wall (HCC)    Severe left ventricular systolic dysfunction    Acute respiratory failure with hypoxemia (HCC)    Cardiogenic pulmonary edema (HCC)    PAD (peripheral artery disease) (HCC)    Subacute osteomyelitis of right tibia ([address]    Exposed orthopaedic hardware ([address]    Metabolic encephalopathy    Abnormal CT of the head    Fever chills    Hepatitis    Acute cerebral infarction associated with systemic hypoxia or ischemia    Multi infarct state     1.  S/p CABG Emergent  POD 13  Extubated  Plan cerebral angio and possible LU stent  Swallow study prior to starting po    DVT ppx: Lovenox & SCD's while stationary  Patient is on BB, ASA, Statin therapy per protocol   Plan for discharge SNF/rehab    The above recommendations including medications and orders were discussed and agreed upon with  Laura Aguilar, the attending on service for the cardiothoracic surgery group today.      Electronically signed by CINDY Zimmer on 4/20/2019 at 7:40 AM

## 2019-04-20 NOTE — PROGRESS NOTES
Physical Therapy    Facility/Department: [facility] CAR 1  Re-Assessmet    NAME: Tracey Orellana  : 1965  MRN: [id number]    Date of Service: 2019    Discharge Recommendations:  Further therapy recommended at discharge. Assessment   Assessment: Pt is 45 y/o male with significant weakness due to prolong hospitalization with intubation; will benefit from PT. Treatment Diagnosis: general weakness; difficulty walking  Prognosis: Good  Patient Education: Re-assess  REQUIRES PT FOLLOW UP: Yes  Activity Tolerance  Activity Tolerance: Patient Tolerated treatment well       Patient Diagnosis(es): The primary encounter diagnosis was Acute ST elevation myocardial infarction (STEMI) of anterior wall ([facility] [address] A diagnosis of Cardiac arrest [facility]) was also pertinent to this visit. has a past medical history of CAD (coronary artery disease), Cardiac arrest ([facility] [address] [facility] Osteolysis, PEA (Pulseless electrical activity) ([facility] [address] and PVD (peripheral vascular disease) ([facility] [address] [facility] has a past surgical history that includes Cardiac catheterization (2019); Femur Surgery; Tonsillectomy; Coronary artery bypass graft (2019); Hardware Removal (Right, 2019); Tibia fracture surgery (Right); Coronary artery bypass graft (N/A, 2019); and RECONSTRUCTIVE REPAIR STERNAL (N/A, 2019).     Restrictions  Restrictions/Precautions  Restrictions/Precautions: General Precautions, Fall Risk, Surgical Protocols  Required Braces or Orthoses?: No  Position Activity Restriction  Sternal Precautions: No Pushing, No Pulling, 5# Lifting Restrictions     Vision/Hearing  Vision: Within Functional Limits  Hearing: Within functional limits       Subjective  General  Chart Reviewed: Yes  Response To Previous Treatment: Not applicable  Family / Caregiver Present: No  Follows Commands: Within Functional Limits  Pain Screening  Patient Currently in Pain: No    Orientation  Orientation  Overall Orientation Status: Within Functional Limits Social/Functional History  Social/Functional History  Lives With: Other (comment)  Home Layout: One level  Home Access: Stairs to enter with rails  Entrance Stairs - Number of Steps: 22  ADL Assistance: Independent  Homemaking Assistance: Independent  Homemaking Responsibilities: Yes  Ambulation Assistance: Independent  Transfer Assistance: Independent  Active : No  Additional Comments: 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 Pt does not use any AD or DME. Cognition   Cognition  Overall Cognitive Status: WFL    Objective  PROM RLE (degrees)  RLE PROM: WFL  RLE General PROM: Ankle DF to neutral  PROM LLE (degrees)  LLE PROM: WFL  LLE General PROM: Ankle DF to neutral     Strength RLE  Comment: hip and knee 2/5, ankle DF 1/5  Strength LLE  Comment: hip and knee 2/5, ankle DF 1/5        Bed mobility  Supine to Sit: Moderate assistance     Transfers  Squat Pivot Transfers: Moderate Assistance;Maximum Assistance     Ambulation  Ambulation?: No     Balance  Standing - Static: Good;Fair  Standing - Dynamic: Fair(Pt requires occasional assistance to maintain balance)        Plan   Plan  Times per week: 7 x week BID  Times per day: Twice a day  Current Treatment Recommendations: ROM, Strengthening, Functional Mobility Training, Endurance Training, Transfer Training, Gait Training, Stair training, Balance Training, Neuromuscular Re-education, Home Exercise Program, Safety Education & Training  Safety Devices  Type of devices:  All fall risk precautions in place, Chair alarm in place  Restraints  Initially in place: Yes  Restraints: B hand restraints      AM-PAC Score  AM-PAC Inpatient Mobility without Stair Climbing Raw Score : 8  AM-PAC Inpatient without Stair Climbing T-Scale Score : 30.65  Mobility Inpatient CMS 0-100% Score: 80.91  Mobility Inpatient without Stair CMS G-Code Modifier : CM       Goals  Short term goals  Time Frame for Short term goals: 14 visits  Short term goal 1: Pt to transfer sit to stand with min A + 1. Short term goal 2: Pt to ambulate with least restrictive AD 40ft with no LOB. Short term goal 3: Pt to demonstrate good sitting balance. Short term goal 4: Pt to tolerate 20-30 mins ther ex/act for improved strength and endurance for ADL's.    Patient Goals   Patient goals : none stated       Therapy Time   Individual Concurrent Group Co-treatment   Time In 0822         Time Out 0900         Minutes [address], PT, DPT, CMPT

## 2019-04-20 NOTE — PLAN OF CARE
Patient passed swallow study, tolerating dental soft diet. Assist with feeding as needed. Patient finishing about 50% of trays. Will monitor.

## 2019-04-20 NOTE — PROGRESS NOTES
Patient had very large, soft stool. Dressing for [employer] on the upper leg soiled, changed out. External catheter also changed due to soiling.

## 2019-04-20 NOTE — PROGRESS NOTES
RN updated Dr Omar Solis, cardiology fellow regarding acute events over night. Discussed VS, I/O's, recent lab values and current plan.     No new orders the this time

## 2019-04-20 NOTE — PROGRESS NOTES
infarct and possible subacute infarct in left lower parietal region.  Patient has underlying history of hypertension with non compliance to treatment. Pain:  Pain Assessment  Pain Assessment: Faces  Pain Level: 7  Rodríguez-Baker Pain Rating: No hurt  Response to Pain Intervention: Asleep with RR greater than 10  RASS Score (Ventilated): Alert and calm  Pain Assessment/FLACC  Pain Rating: FLACC (rest) - Face: no particular expression or smile  Pain Rating: FLACC (rest) - Legs: normal position or relaxed  Pain Rating: FLACC (rest) - Activity: lying quietly, normal position, moves easily  Pain Rating: FLACC (rest) - Cry: no cry (awake or asleep)  Pain Rating: FLACC (rest) - Consolability: content, relaxed  Score: FLACC (rest): 0    Reason for Referral  Karrie Zapata was referred for a bedside swallow evaluation to assess the efficiency of his swallow function, identify signs and symptoms of aspiration and make recommendations regarding safe dietary consistencies, effective compensatory strategies, and safe eating environment. Impression Patient presents with probable safe swallow for Dental Soft diet with thin liquids as evidenced by no overt s/s of aspiration noted with consistencies tested. Pt edentulous, so regular solid (cracker) not tested. Recommend small sips and bites, only feed when alert and awake and upright at 90 degrees for all PO intake. Recommend close monitoring for overt/clinical s/s of aspiration and D/C PO intake and complete Modified Barium Swallow Study should they occur. Results and recommendations reported to RN.   Dysphagia Diagnosis: Swallow function appears grossly intact  Dysphagia Outcome Severity Scale: Level 6: Within functional limits/Modified independence     Treatment Plan  Requires SLP Intervention: Yes  Duration/Frequency of Treatment: 1-2 x week  D/C Recommendations: Inpatient rehabilitation       Recommended Diet and Intervention  Diet Solids Recommendation: Dental Soft  Liquid Consistency Recommendation: Thin  Recommended Form of Meds: Meds in puree     Therapeutic Interventions: Diet tolerance monitoring    Compensatory Swallowing Strategies  Compensatory Swallowing Strategies: Small bites/sips;Eat/Feed slowly;Upright as possible for all oral intake    Treatment/Goals  Dysphagia Goals: The patient will tolerate recommended diet without observed clinical signs of aspiration    General  Chart Reviewed: Yes  Behavior/Cognition: Alert; Cooperative;Pleasant mood  Temperature Spikes Noted: No  Respiratory Status: O2 via nasual cannula  Breath Sounds: Clear  O2 Device: Nasal cannula  Dentition: Edentulous  Patient Positioning: Upright in chair  Consistencies Administered: Soft solid; Nectar - straw;Nectar - teaspoon; Thin - teaspoon; Thin - straw;Puree           Vision/Hearing  Vision  Vision: Within Functional Limits  Hearing  Hearing: Within functional limits    Oral Motor Deficits  Oral/Motor  Oral Motor:  Within functional limits    Oral Phase Dysfunction  Oral Phase  Oral Phase: WFL     Indicators of Pharyngeal Phase Dysfunction   Pharyngeal Phase  Pharyngeal Phase: WFL    Prognosis  Prognosis  Prognosis for safe diet advancement: good  Individuals consulted  Consulted and agree with results and recommendations: Patient;RN    Education  Patient Education: yes  Patient Education Response: Verbalizes understanding               Therapy Time  SLP Individual Minutes  Time In: 0845  Time Out: 0015  Minutes: [id number], SLP  4/20/2019 9:16 AM

## 2019-04-20 NOTE — PROGRESS NOTES
Physical Therapy  Facility/Department: [facility] CAR 1  Daily Treatment Note  NAME: Tawana Rondon  : 1965  MRN: [id number]    Date of Service: 2019    Discharge Recommendations: Further therapy recommended at discharge. Patient Diagnosis(es): The primary encounter diagnosis was Acute ST elevation myocardial infarction (STEMI) of anterior wall ([facility] [address] A diagnosis of Cardiac arrest [facility]) was also pertinent to this visit. has a past medical history of CAD (coronary artery disease), Cardiac arrest ([facility] [address] Osteolysis, PEA (Pulseless electrical activity) ([facility] [address] and PVD (peripheral vascular disease) ([facility] [address] has a past surgical history that includes Cardiac catheterization (2019); Femur Surgery; Tonsillectomy; Coronary artery bypass graft (2019); Hardware Removal (Right, 2019); Tibia fracture surgery (Right); Coronary artery bypass graft (N/A, 2019); and RECONSTRUCTIVE REPAIR STERNAL (N/A, 2019). Restrictions  Restrictions/Precautions  Restrictions/Precautions: General Precautions, Fall Risk, Surgical Protocols  Required Braces or Orthoses?: No  Position Activity Restriction  Sternal Precautions: No Pushing, No Pulling, 5# Lifting Restrictions  Subjective   General  Chart Reviewed: Yes  Response To Previous Treatment: Patient with no complaints from previous session. Family / Caregiver Present: No  Subjective  Subjective: Pt and nursing agreeable to PT. Pt up in chair. General Comment  Comments: Catheter, 2 wound VACs and IV pole. Orientation  Orientation  Overall Orientation Status: Within Functional Limits  Cognition   Cognition  Overall Cognitive Status: WFL  Objective      Transfers  Stand to sit: Moderate Assistance  Squat Pivot Transfers: Maximum Assistance  Comment: Pt not able to assist much with SPT secondary to LE and ankle/foot weakness.   Ambulation  Ambulation?: No        Other exercises  Other exercises?: Yes  Other exercises 1: Supine Bilat LE ex x10 reps

## 2019-04-21 LAB
ANION GAP SERPL CALCULATED.3IONS-SCNC: 9 MMOL/L (ref 9–17)
BUN BLDV-MCNC: 16 MG/DL (ref 6–20)
BUN/CREAT BLD: ABNORMAL (ref 9–20)
CALCIUM SERPL-MCNC: 7.8 MG/DL (ref 8.6–10.4)
CHLORIDE BLD-SCNC: 106 MMOL/L (ref 98–107)
CO2: 25 MMOL/L (ref 20–31)
CREAT SERPL-MCNC: 0.44 MG/DL (ref 0.7–1.2)
GFR AFRICAN AMERICAN: >60 ML/MIN
GFR NON-AFRICAN AMERICAN: >60 ML/MIN
GFR SERPL CREATININE-BSD FRML MDRD: ABNORMAL ML/MIN/{1.73_M2}
GFR SERPL CREATININE-BSD FRML MDRD: ABNORMAL ML/MIN/{1.73_M2}
GLUCOSE BLD-MCNC: 110 MG/DL (ref 70–99)
GLUCOSE BLD-MCNC: 184 MG/DL (ref 75–110)
GLUCOSE BLD-MCNC: 212 MG/DL (ref 75–110)
GLUCOSE BLD-MCNC: 228 MG/DL (ref 75–110)
GLUCOSE BLD-MCNC: 257 MG/DL (ref 75–110)
GLUCOSE BLD-MCNC: 64 MG/DL (ref 75–110)
GLUCOSE BLD-MCNC: 86 MG/DL (ref 75–110)
HCT VFR BLD CALC: 29.1 % (ref 40.7–50.3)
HEMOGLOBIN: 8.7 G/DL (ref 13–17)
INR BLD: 1.4
MAGNESIUM: 1.9 MG/DL (ref 1.6–2.6)
MCH RBC QN AUTO: 29.7 PG (ref 25.2–33.5)
MCHC RBC AUTO-ENTMCNC: 29.9 G/DL (ref 28.4–34.8)
MCV RBC AUTO: 99.3 FL (ref 82.6–102.9)
NRBC AUTOMATED: 0 PER 100 WBC
PDW BLD-RTO: 13.8 % (ref 11.8–14.4)
PLATELET # BLD: 472 K/UL (ref 138–453)
PMV BLD AUTO: 11.7 FL (ref 8.1–13.5)
POTASSIUM SERPL-SCNC: 3.9 MMOL/L (ref 3.7–5.3)
PROTHROMBIN TIME: 14.1 SEC (ref 9–12)
RBC # BLD: 2.93 M/UL (ref 4.21–5.77)
SODIUM BLD-SCNC: 140 MMOL/L (ref 135–144)
WBC # BLD: 18.8 K/UL (ref 3.5–11.3)

## 2019-04-21 PROCEDURE — 82947 ASSAY GLUCOSE BLOOD QUANT: CPT

## 2019-04-21 PROCEDURE — 6370000000 HC RX 637 (ALT 250 FOR IP): Performed by: INTERNAL MEDICINE

## 2019-04-21 PROCEDURE — 94668 MNPJ CHEST WALL SBSQ: CPT

## 2019-04-21 PROCEDURE — 80048 BASIC METABOLIC PNL TOTAL CA: CPT

## 2019-04-21 PROCEDURE — 36415 COLL VENOUS BLD VENIPUNCTURE: CPT

## 2019-04-21 PROCEDURE — 99232 SBSQ HOSP IP/OBS MODERATE 35: CPT | Performed by: INTERNAL MEDICINE

## 2019-04-21 PROCEDURE — 97530 THERAPEUTIC ACTIVITIES: CPT

## 2019-04-21 PROCEDURE — 6360000002 HC RX W HCPCS: Performed by: THORACIC SURGERY (CARDIOTHORACIC VASCULAR SURGERY)

## 2019-04-21 PROCEDURE — 2140000001 HC CVICU INTERMEDIATE R&B

## 2019-04-21 PROCEDURE — 6370000000 HC RX 637 (ALT 250 FOR IP): Performed by: FAMILY MEDICINE

## 2019-04-21 PROCEDURE — 2580000003 HC RX 258: Performed by: NURSE PRACTITIONER

## 2019-04-21 PROCEDURE — 6360000002 HC RX W HCPCS: Performed by: NURSE PRACTITIONER

## 2019-04-21 PROCEDURE — 83735 ASSAY OF MAGNESIUM: CPT

## 2019-04-21 PROCEDURE — 2700000000 HC OXYGEN THERAPY PER DAY

## 2019-04-21 PROCEDURE — 97535 SELF CARE MNGMENT TRAINING: CPT

## 2019-04-21 PROCEDURE — 94640 AIRWAY INHALATION TREATMENT: CPT

## 2019-04-21 PROCEDURE — 97166 OT EVAL MOD COMPLEX 45 MIN: CPT

## 2019-04-21 PROCEDURE — 6370000000 HC RX 637 (ALT 250 FOR IP): Performed by: STUDENT IN AN ORGANIZED HEALTH CARE EDUCATION/TRAINING PROGRAM

## 2019-04-21 PROCEDURE — 85610 PROTHROMBIN TIME: CPT

## 2019-04-21 PROCEDURE — 6370000000 HC RX 637 (ALT 250 FOR IP): Performed by: NURSE PRACTITIONER

## 2019-04-21 PROCEDURE — 97110 THERAPEUTIC EXERCISES: CPT

## 2019-04-21 PROCEDURE — 85027 COMPLETE CBC AUTOMATED: CPT

## 2019-04-21 PROCEDURE — 6370000000 HC RX 637 (ALT 250 FOR IP): Performed by: PHYSICIAN ASSISTANT

## 2019-04-21 PROCEDURE — 6360000002 HC RX W HCPCS: Performed by: FAMILY MEDICINE

## 2019-04-21 PROCEDURE — 94762 N-INVAS EAR/PLS OXIMTRY CONT: CPT

## 2019-04-21 RX ADMIN — MULTIPLE VITAMINS W/ MINERALS TAB 1 TABLET: TAB at 09:55

## 2019-04-21 RX ADMIN — ENOXAPARIN SODIUM 40 MG: 40 INJECTION SUBCUTANEOUS at 09:54

## 2019-04-21 RX ADMIN — INSULIN LISPRO 4 UNITS: 100 INJECTION, SOLUTION INTRAVENOUS; SUBCUTANEOUS at 10:00

## 2019-04-21 RX ADMIN — Medication 100 MG: at 09:54

## 2019-04-21 RX ADMIN — POTASSIUM CHLORIDE 20 MEQ: 1.5 POWDER, FOR SOLUTION ORAL at 09:55

## 2019-04-21 RX ADMIN — DOXYCYCLINE HYCLATE 100 MG: 100 TABLET, COATED ORAL at 20:55

## 2019-04-21 RX ADMIN — FUROSEMIDE 40 MG: 10 INJECTION, SOLUTION INTRAMUSCULAR; INTRAVENOUS at 09:54

## 2019-04-21 RX ADMIN — FAMOTIDINE 20 MG: 20 TABLET, FILM COATED ORAL at 09:55

## 2019-04-21 RX ADMIN — Medication 500000 UNITS: at 14:36

## 2019-04-21 RX ADMIN — DIPHENHYDRAMINE HCL 25 MG: 25 TABLET ORAL at 20:55

## 2019-04-21 RX ADMIN — INSULIN LISPRO 3 UNITS: 100 INJECTION, SOLUTION INTRAVENOUS; SUBCUTANEOUS at 23:34

## 2019-04-21 RX ADMIN — Medication 500000 UNITS: at 09:54

## 2019-04-21 RX ADMIN — POLYETHYLENE GLYCOL 3350 17 G: 17 POWDER, FOR SOLUTION ORAL at 09:55

## 2019-04-21 RX ADMIN — ALBUTEROL SULFATE 2.5 MG: 2.5 SOLUTION RESPIRATORY (INHALATION) at 09:22

## 2019-04-21 RX ADMIN — METOPROLOL TARTRATE 25 MG: 25 TABLET ORAL at 20:12

## 2019-04-21 RX ADMIN — ASPIRIN 81 MG 81 MG: 81 TABLET ORAL at 09:55

## 2019-04-21 RX ADMIN — Medication 500000 UNITS: at 17:38

## 2019-04-21 RX ADMIN — INSULIN GLARGINE 30 UNITS: 100 INJECTION, SOLUTION SUBCUTANEOUS at 09:58

## 2019-04-21 RX ADMIN — FUROSEMIDE 40 MG: 10 INJECTION, SOLUTION INTRAMUSCULAR; INTRAVENOUS at 17:51

## 2019-04-21 RX ADMIN — POTASSIUM CHLORIDE 20 MEQ: 1.5 POWDER, FOR SOLUTION ORAL at 20:12

## 2019-04-21 RX ADMIN — INSULIN LISPRO 2 UNITS: 100 INJECTION, SOLUTION INTRAVENOUS; SUBCUTANEOUS at 14:36

## 2019-04-21 RX ADMIN — SPIRONOLACTONE 25 MG: 25 TABLET ORAL at 09:56

## 2019-04-21 RX ADMIN — Medication 500000 UNITS: at 20:12

## 2019-04-21 RX ADMIN — Medication 15 ML: at 20:13

## 2019-04-21 RX ADMIN — OXYCODONE HYDROCHLORIDE AND ACETAMINOPHEN 2 TABLET: 5; 325 TABLET ORAL at 21:02

## 2019-04-21 RX ADMIN — DOXYCYCLINE HYCLATE 100 MG: 100 TABLET, COATED ORAL at 09:55

## 2019-04-21 RX ADMIN — Medication 10 ML: at 09:54

## 2019-04-21 RX ADMIN — LISINOPRIL 5 MG: 5 TABLET ORAL at 14:36

## 2019-04-21 RX ADMIN — Medication 15 ML: at 09:54

## 2019-04-21 RX ADMIN — FAMOTIDINE 20 MG: 20 TABLET, FILM COATED ORAL at 20:12

## 2019-04-21 RX ADMIN — ALBUTEROL SULFATE 2.5 MG: 2.5 SOLUTION RESPIRATORY (INHALATION) at 17:07

## 2019-04-21 RX ADMIN — CLOPIDOGREL 75 MG: 75 TABLET, FILM COATED ORAL at 09:55

## 2019-04-21 RX ADMIN — ALBUTEROL SULFATE 2.5 MG: 2.5 SOLUTION RESPIRATORY (INHALATION) at 03:44

## 2019-04-21 RX ADMIN — METOPROLOL TARTRATE 25 MG: 25 TABLET ORAL at 09:55

## 2019-04-21 RX ADMIN — Medication 10 ML: at 20:13

## 2019-04-21 RX ADMIN — ALBUTEROL SULFATE 2.5 MG: 2.5 SOLUTION RESPIRATORY (INHALATION) at 20:09

## 2019-04-21 RX ADMIN — DESMOPRESSIN ACETATE 40 MG: 0.2 TABLET ORAL at 20:12

## 2019-04-21 ASSESSMENT — PAIN DESCRIPTION - FREQUENCY: FREQUENCY: CONTINUOUS

## 2019-04-21 ASSESSMENT — PAIN DESCRIPTION - ORIENTATION: ORIENTATION: RIGHT

## 2019-04-21 ASSESSMENT — PAIN DESCRIPTION - DESCRIPTORS: DESCRIPTORS: ACHING

## 2019-04-21 ASSESSMENT — PAIN SCALES - GENERAL
PAINLEVEL_OUTOF10: 8
PAINLEVEL_OUTOF10: 7

## 2019-04-21 ASSESSMENT — PAIN DESCRIPTION - PAIN TYPE: TYPE: ACUTE PAIN

## 2019-04-21 ASSESSMENT — PAIN DESCRIPTION - LOCATION: LOCATION: CHEST;KNEE

## 2019-04-21 NOTE — PROGRESS NOTES
[facility]  Daily Progress Note    Surgeon:  Dr. Mayela Cabrera  S/P :  Emerg Coronary artery bypass 4/7/19  Chest closure 4/8/19  EF: 45 %    Subjective:  Mr. Fransisco Roy extubated yesterday. Physical Exam  Vital Signs: BP (!) 151/70   Pulse 94   Temp 97.9 °F (36.6 °C) (Oral)   Resp 16   Ht 5' 8\" (1.727 m)   Wt 161 lb 13.1 oz (73.4 kg)   SpO2 97%   BMI 24.60 kg/m²  O2 Flow Rate (L/min): 4 L/min   Admit Weight: Weight: 176 lb 5.9 oz (80 kg)   WEIGHTWeight: 161 lb 13.1 oz (73.4 kg)     General: in no acute distress. Resting in bed  Heart:Normal S1 and S2.  Regular rhythm. No murmurs, gallops, or rubs. Pacing Wires Yes   Lungs: clear to auscultation bilaterally  Abdomen: soft, non tender, non distended, BSx4  Extremities: negative  Wounds: clean and dry, healing appropriately. Pravena to left leg.      Scheduled Meds:    spironolactone  25 mg Oral Daily    metoprolol tartrate  25 mg Oral BID    insulin lispro  0-12 Units Subcutaneous TID WC    insulin lispro  0-6 Units Subcutaneous Nightly    insulin glargine  30 Units Subcutaneous Daily    lisinopril  5 mg Oral Daily    furosemide  40 mg Intravenous BID    potassium chloride  20 mEq Oral BID    doxycycline hyclate  100 mg Oral 2 times per day    famotidine  20 mg Oral BID    enoxaparin  40 mg Subcutaneous Daily    nystatin  5 mL Oral 4x Daily    aspirin  81 mg Oral Daily    docusate  100 mg Oral Daily    dextrose  12.5 g Intravenous Once    albuterol  2.5 mg Nebulization Q6H    sodium chloride flush  10 mL Intravenous 2 times per day    sodium chloride flush  10 mL Intravenous 2 times per day    polyethylene glycol  17 g Oral Daily    chlorhexidine  15 mL Mouth/Throat BID    therapeutic multivitamin-minerals  1 tablet Oral Daily with breakfast    atorvastatin  40 mg Oral Nightly    clopidogrel  75 mg Oral Daily     Continuous Infusions:    dextrose 5 % and 0.45 % NaCl 75 mL/hr at 04/20/19 0963    sodium chloride      niCARdipine      dextrose         Data:  CBC:   Recent Labs     04/19/19  0643 04/20/19  0452 04/20/19  1650 04/21/19  0502   WBC 20.1* 19.5*  --  18.8*   HGB 8.7* 9.4*  --  8.7*   HCT 28.1* 32.2*  --  29.1*   MCV 96.9 99.7  --  99.3   * 527* 509* 472*     BMP:   Recent Labs     04/19/19  0643 04/20/19  0452 04/21/19  0502   * 152* 140   K 3.5* 3.1* 3.9   * 115* 106   CO2 26 27 25   BUN 24* 24* 16   CREATININE 0.43* 0.50* 0.44*     PT/INR:   Recent Labs     04/19/19  0754 04/20/19  0452 04/21/19  0502   PROTIME 42.0* 13.5* 14.1*   INR 4.5 1.3 1.4     APTT: No results for input(s): APTT in the last 72 hours. Chest X-Ray: 4/17/19:   ET tube in good position.  Enteric tube passes beneath the diaphragm.  Intact   sternotomy wires.  Cardiac leads overlie the chest.  Enlarged heart with   moderate pulmonary edema.  Bilateral pleural effusions.  No pneumothorax.           Impression   Stable moderate pulmonary edema and effusions.       Stable supporting devices. I/O:  I/O last 3 completed shifts: In: 0211 [P.O.:650; I.V.:837]  Out: 46 [Urine:3400]      Assessment & Plan:   Patient Active Problem List   Diagnosis    Cardiac arrest ([facility] [address]    Acute ST elevation myocardial infarction (STEMI) of anterior wall (HCC)    Severe left ventricular systolic dysfunction    Acute respiratory failure with hypoxemia (HCC)    Cardiogenic pulmonary edema (HCC)    PAD (peripheral artery disease) (HCC)    Subacute osteomyelitis of right tibia ([facility] [address]    Exposed orthopaedic hardware ([facility] [address]    Metabolic encephalopathy    Abnormal CT of the head    Fever chills    Hepatitis    Acute cerebral infarction associated with systemic hypoxia or ischemia    Multi infarct state    Carotid stenosis, bilateral     1.  S/p CABG Emergent  POD 14  D/c staples, prevena  Plan cerebral angio and possible LU stent  Swallow study - passed with dental soft diet  Ortho for right prosthesis    DVT

## 2019-04-21 NOTE — PROGRESS NOTES
Perfect serve sent to Dr. Adelina Mazariegos with ortho regarding coming to see pt.  Return message rec'd states will round on pt in am and update plan at that time

## 2019-04-21 NOTE — PROGRESS NOTES
Physical Therapy  Facility/Department: [facility] CAR 1  Daily Treatment Note  NAME: Tawana Rondon  : 1965  MRN: [id number]    Date of Service: 2019    Discharge Recommendations: Further therapy recommended at discharge. Patient Diagnosis(es): The primary encounter diagnosis was Acute ST elevation myocardial infarction (STEMI) of anterior wall ([facility] [address] A diagnosis of Cardiac arrest [facility]) was also pertinent to this visit. has a past medical history of CAD (coronary artery disease), Cardiac arrest ([facility] [address] Osteolysis, PEA (Pulseless electrical activity) ([facility] [address] and PVD (peripheral vascular disease) ([facility] [address] has a past surgical history that includes Cardiac catheterization (2019); Femur Surgery; Tonsillectomy; Coronary artery bypass graft (2019); Hardware Removal (Right, 2019); Tibia fracture surgery (Right); Coronary artery bypass graft (N/A, 2019); and RECONSTRUCTIVE REPAIR STERNAL (N/A, 2019). Restrictions  Restrictions/Precautions  Restrictions/Precautions: General Precautions, Fall Risk, Surgical Protocols  Required Braces or Orthoses?: No  Position Activity Restriction  Sternal Precautions: No Pushing, No Pulling, 5# Lifting Restrictions  Other position/activity restrictions: Amb pt, up with A  Subjective   General  Chart Reviewed: Yes  Response To Previous Treatment: Patient with no complaints from previous session. Family / Caregiver Present: Yes  Subjective  Subjective: Pt and nursing agreeable to PT. Pt up in chair. General Comment  Comments: Catheter,           Orientation  Orientation  Overall Orientation Status: Within Functional Limits  Cognition   Cognition  Overall Cognitive Status: Exceptions  Objective      Transfers  Sit to Stand: Moderate Assistance  Stand to sit: Moderate Assistance  Ambulation  Ambulation?: Yes  Ambulation 1  Surface: level tile  Device: Rolling Walker  Assistance:  Moderate assistance  Quality of Gait: WBAT Right LE, Flexed posture  Distance: Pt stood at walker and was not able to advance left LE. Stood x90 seconds. attemtped marching in place. Pt not able to clear foot from floor. Comments: Posterior LOB x2        Exercises  Comments: Sit to stand HHA/Face to Face: x 5 reps for function LE exercise. Other exercises  Other exercises 1: Seated Bilat LE ex's x10 reps: PROM/AAROM DF/PF, LAQ AROM left, AAROM right, Marching AROM left and AAROM Right. Other exercises 2: Seated Bilat UE x10 reps: AROM Shoulder flexion to 90 degrees, Chest press AROM left, AAROM right                        Assessment   Assessment: Improving alertness and participation. Pt stood x90 seconds today with MOD assist.   Treatment Diagnosis: general weakness; difficulty walking  Prognosis: Good  Patient Education: Mobility and getting out of bed to chair. Activity Tolerance  Activity Tolerance: Patient Tolerated treatment well  Activity Tolerance: not able to bear wt on right enough to advance left LE. Fatigue with exercises       Goals  Short term goals  Time Frame for Short term goals: 14 visits  Short term goal 1: Pt to transfer sit to stand with min A + 1. Short term goal 2: Pt to ambulate with least restrictive AD 40ft with no LOB. Short term goal 3: Pt to demonstrate good sitting balance. Short term goal 4: Pt to tolerate 20-30 mins ther ex/act for improved strength and endurance for ADL's. Patient Goals   Patient goals : none stated    Plan    Plan  Times per week: 7 x week BID  Times per day: Twice a day  Current Treatment Recommendations: ROM, Strengthening, Functional Mobility Training, Endurance Training, Transfer Training, Gait Training, Stair training, Balance Training, Neuromuscular Re-education, Home Exercise Program, Safety Education & Training  Safety Devices  Type of devices:  All fall risk precautions in place, Call light within reach, Nurse notified, Gait belt, Chair alarm in place, Left in chair, Patient at risk for falls  Restraints  Initially in place: No  Restraints:  chair alarm     Therapy Time   Individual Concurrent Group Co-treatment   Time In 1525         Time Out 1550         Minutes [address], [place]

## 2019-04-21 NOTE — CARE COORDINATION
Care Transition  Met with patient and he defers SNF/Rehab choice to son. Called son and he said he has not met with family to determine.

## 2019-04-21 NOTE — PLAN OF CARE
BRONCHOSPASM/BRONCHOCONSTRICTION     [x]         IMPROVE AERATION/BREATH SOUNDS  [x]   ADMINISTER BRONCHODILATOR THERAPY AS APPROPRIATE  [x]   ASSESS BREATH SOUNDS  []   IMPLEMENT AEROSOL/MDI PROTOCOL  [x]   PATIENT EDUCATION AS NEEDED    MOBILIZE SECRETIONS    [x]   ASSESS BREATH SOUNDS  [x]   ASSESS SPUTUM PRODUCTION  [x]   COUGH AND DEEP BREATHING  []  IMPLEMENT SECRETION MANAGEMENT PROTOCOL  [x]   PATIENT EDUCATION AS NEEDED

## 2019-04-21 NOTE — PROGRESS NOTES
Physical Therapy  Facility/Department: [facility] CAR 1  Daily Treatment Note  NAME: Celine Yuen  : 1965  MRN: [id number]    Date of Service: 2019    Discharge Recommendations: Further therapy recommended at discharge. Patient Diagnosis(es): The primary encounter diagnosis was Acute ST elevation myocardial infarction (STEMI) of anterior wall ([facility] [address] A diagnosis of Cardiac arrest [facility]) was also pertinent to this visit. has a past medical history of CAD (coronary artery disease), Cardiac arrest ([facility] [address] Osteolysis, PEA (Pulseless electrical activity) ([facility] [address] and PVD (peripheral vascular disease) ([facility] [address] has a past surgical history that includes Cardiac catheterization (2019); Femur Surgery; Tonsillectomy; Coronary artery bypass graft (2019); Hardware Removal (Right, 2019); Tibia fracture surgery (Right); Coronary artery bypass graft (N/A, 2019); and RECONSTRUCTIVE REPAIR STERNAL (N/A, 2019). Restrictions  Restrictions/Precautions  Restrictions/Precautions: General Precautions, Fall Risk, Surgical Protocols  Required Braces or Orthoses?: No  Position Activity Restriction  Sternal Precautions: No Pushing, No Pulling, 5# Lifting Restrictions  Subjective   General  Chart Reviewed: Yes  Response To Previous Treatment: Patient with no complaints from previous session. Family / Caregiver Present: No  Subjective  Subjective: Pt and nursing agreeable to PT. Pt up in chair. General Comment  Comments: Catheter, 2 wound VACs and IV pole. Orientation  Orientation  Overall Orientation Status: Within Functional Limits  Cognition      Objective      Transfers  Stand to sit: Moderate Assistance  Ambulation  Ambulation?: Yes  Ambulation 1  Surface: level tile  Device: Rolling Walker  Assistance: Moderate assistance  Quality of Gait: WBAT Right LE, Flexed posture  Distance: Pt stood at walker and was not able to advance left LE.  Pt did advance right LE grossly 4 inches forward. Comments: Stood at walker x2 attempts        Other exercises  Other exercises 1: Seated Bilat LE ex's x10 reps: PROM/AAROM DF/PF, LAQ AROM left, AAROM right, Marching AROM left and AAROM Right. Other exercises 2: Seated Bilat UE x10 reps: AROM Shoulder flexion to 90 degrees, Chest press AROM left, AAROM right                        Assessment   Assessment: Improving alertness and participation. Pt stood twice today with MOD assist.   Treatment Diagnosis: general weakness; difficulty walking  Prognosis: Good  Patient Education: Mobility and getting out of bed to chair. Activity Tolerance  Activity Tolerance: Patient Tolerated treatment well  Activity Tolerance: not able to bear wt on right enough to advance left LE. Goals  Short term goals  Time Frame for Short term goals: 14 visits  Short term goal 1: Pt to transfer sit to stand with min A + 1. Short term goal 2: Pt to ambulate with least restrictive AD 40ft with no LOB. Short term goal 3: Pt to demonstrate good sitting balance. Short term goal 4: Pt to tolerate 20-30 mins ther ex/act for improved strength and endurance for ADL's. Patient Goals   Patient goals : none stated    Plan    Plan  Times per week: 7 x week BID  Times per day: Twice a day  Current Treatment Recommendations: ROM, Strengthening, Functional Mobility Training, Endurance Training, Transfer Training, Gait Training, Stair training, Balance Training, Neuromuscular Re-education, Home Exercise Program, Safety Education & Training  Safety Devices  Type of devices:  All fall risk precautions in place, Call light within reach, Nurse notified, Gait belt, Chair alarm in place, Left in chair  Restraints  Initially in place: No  Restraints: B hand restraints     Therapy Time   Individual Concurrent Group Co-treatment   Time In [facility]         Time Out 1100         Minutes [address]

## 2019-04-21 NOTE — FLOWSHEET NOTE
Left upper leg NPWT not functioning. Multiple attempt to resume therapy, changed device, tubing, canister, power cord and occlusive dressing. Dressing, foam and device intact but unable to maintain suction therapy.  No drainage noted, dressing remains CDI, device remains off

## 2019-04-21 NOTE — PROGRESS NOTES
[facility]    Progress Note    4/21/2019    8:23 AM    Name:   Stephan Hendricks  MRN:     [id number]     Acct:      [de-identified]   Room:   [address] [place] [address] Day:  15  Admit Date:  4/7/2019  4:48 AM    PCP:   No primary care provider on file. Code Status:  Full Code    Subjective:     C/C:   Chief Complaint   Patient presents with    Cardiac Arrest     diff breather, brought in on cpap with elevations in EKG on EMS arrival.      Interval History Status: not changed  Continued to do well now  No fevers reported last night  Mild confusion but other wise stable clinical condition  Eating and drinking reasonable overnight  Very good urinary output     Brief History:     The patient is a 46 y.o. male who is admitted for  cardiac arrest and STEMI. Patient was admitted on 4/7/19 after he was brought in by EMS for dyspnea, acute respiratory failure. Patient was placed on CPAP and nebulization treatment. Initial EKG showed possible anterior septal STEMI. On arrival to [facility] emergency room patient had PEA arrest and was resuscitated per ACS protocol with 3 rounds of CPR and epinephrine with successful ROSC. Patient underwent emergent cardiac cath and showed multivessel CAD with severe LV dysfunction with EF 15% and severe hypokinesis. Patient had emergent CABG x 4 on 4/7/19 with closure of chest wall on 4/8/19. Ofelia Mitchell Patient developed acute kidney injury post-CAB with creatinine peaking at 1.99 which improved after fluid resuscitation and pressor support. Patient was taken off pressor support. Patient was asked. On 4/10/19. Patient had altered mental status upon weaning from  sedatives . CT head showed scattered areas of encephalomalacia in bifrontal and left parietal areas consistent with remote infarct and possible subacute infarct in left lower parietal region. Patient has underlying history of hypertension with non compliance to treatment.    Patient unable  To provide history as he is intubated . Has not been following up with providers for care . Patient also has wound in R knee and discharge . H/o Orthopedic surgery . Patient tolerating tube feeds     Review of Systems:     Mildly confused at my visit pulling on condom catheter  No fevers, chills reported overnight  No worsened SOB or coughing reported  Stable blood sugars    Medications:      Allergies:  Not on File    Current Meds:   Scheduled Meds:    spironolactone  25 mg Oral Daily    metoprolol tartrate  25 mg Oral BID    insulin lispro  0-12 Units Subcutaneous TID WC    insulin lispro  0-6 Units Subcutaneous Nightly    insulin glargine  30 Units Subcutaneous Daily    lisinopril  5 mg Oral Daily    furosemide  40 mg Intravenous BID    potassium chloride  20 mEq Oral BID    doxycycline hyclate  100 mg Oral 2 times per day    famotidine  20 mg Oral BID    enoxaparin  40 mg Subcutaneous Daily    nystatin  5 mL Oral 4x Daily    aspirin  81 mg Oral Daily    docusate  100 mg Oral Daily    dextrose  12.5 g Intravenous Once    albuterol  2.5 mg Nebulization Q6H    sodium chloride flush  10 mL Intravenous 2 times per day    sodium chloride flush  10 mL Intravenous 2 times per day    polyethylene glycol  17 g Oral Daily    chlorhexidine  15 mL Mouth/Throat BID    therapeutic multivitamin-minerals  1 tablet Oral Daily with breakfast    atorvastatin  40 mg Oral Nightly    clopidogrel  75 mg Oral Daily     Continuous Infusions:    dextrose 5 % and 0.45 % NaCl 75 mL/hr at 04/20/19 0925    sodium chloride      niCARdipine      dextrose       PRN Meds: potassium chloride, potassium chloride, magnesium hydroxide, fentanNYL, sodium chloride flush, labetalol, albuterol, sodium chloride flush, sodium chloride flush, calcium chloride IVPB, magnesium sulfate, potassium chloride, acetaminophen, acetaminophen, oxyCODONE-acetaminophen **OR** oxyCODONE-acetaminophen, diphenhydrAMINE, bisacodyl, ondansetron, hydrALAZINE, albumin human, glucose, dextrose, glucagon (rDNA), dextrose    Data:     Past Medical History:   has a past medical history of CAD (coronary artery disease), Cardiac arrest ([facility] Osteolysis, PEA (Pulseless electrical activity) ([facility] and PVD (peripheral vascular disease) [facility] Social History:        Family History: History reviewed. No pertinent family history. Vitals:  BP (!) 151/70   Pulse 88   Temp 97.9 °F (36.6 °C) (Oral)   Resp 18   Ht 5' 8\" (1.727 m)   Wt 161 lb 13.1 oz (73.4 kg)   SpO2 99%   BMI 24.60 kg/m²   Temp (24hrs), Av.2 °F (36.8 °C), Min:97.9 °F (36.6 °C), Max:98.6 °F (37 °C)    Recent Labs     19  0711 19  1649 19  0655   POCGLU 124* 207* 270* 212*     I/O (24Hr):     Intake/Output Summary (Last 24 hours) at 2019 0823  Last data filed at 2019 0400  Gross per 24 hour   Intake 1487 ml   Output 3400 ml   Net -1913 ml     Labs:    Lab Results   Component Value Date/Time    SPECIAL NOT REPORTED 2019 07:55 AM     Lab Results   Component Value Date/Time    CULTURE NO GROWTH 2019 07:55 AM     Lab Results   Component Value Date    POCPH 7.451 2019    POCPCO2 44.1 2019    POCPO2 72.4 2019    POCHCO3 30.8 2019    NBEA NOT REPORTED 2019    PBEA 6 2019    JVW0TGQ 32 2019    NCBV6NBT 95 2019    FIO2 40.0 2019     Physical Examination:        General appearance: conscious, coherent, sat at bedside, able to move all extremities and follow commands, generalized weakness noted but right leg seems to have worse weakness  Mental Status: coherent  Lungs:  clear to auscultation bilaterally, normal effort  Heart:  regular rate and rhythm, no murmur  Abdomen:  soft, nontender, nondistended, normal bowel sounds  Extremities:  no edema, redness, tenderness in the calves  Skin:  no gross lesions, rashes, induration    Assessment:        Primary Problem  Cardiac arrest [facility])    Active Hospital Problems    Diagnosis Date Noted    Carotid stenosis, bilateral [I65.23]     Acute cerebral infarction associated with systemic hypoxia or ischemia [I63.89]     Multi infarct state [I69.30]     Fever chills [R50.9]     Hepatitis [D45.7]     Metabolic encephalopathy [C67.73]     Abnormal CT of the head [R93.0]     Card[facility] arrest ([facility] [I46.9] 04/07/2019    Severe left ventricular systolic dysfunction [H42.3] 04/07/2019    Acute respiratory failure with hypoxemia ([facility]) [J96.01] 04/07/2019    Cardiogenic pulmonary edema ([facility] [I50.1] 04/07/2019    PAD (peripheral artery disease) ([facility]) [I73.9] 04/07/2019    Acute ST elevation myocardial infarction (STEMI) of anterior wall ([facility]) [I21.09]     Subacute osteomyelitis of right tibia [facility]) [Y64.629]     Exposed orthopaedic hardware ([facility] [A48.534J]      Plan:        - Cardiac arrest: PEA needed 3 rounds of chest compressions  - Multivessel CAD, status post CABG now  - Encephalopathy, significant, EEG with no definite seizures. MRI with multiple small strokes in cerebral and cerebellar areas. Status post extubation, confusion improved now, only mild right leg weakness  - CVA, multiple, small, bilateral cerebral area and left cerebellum area. NO regional large infarcts, continued close follow up of clinical status, improving overall  - Carotid stenosis, bilateral seems worse on left side. CT angiogram with left sided ICA stenosis 100 percent and 60 percent right sided, planning for right carotid stenting this admission  - Hypertension, on multiple medications, change lopressor to oral form, continue lisinopril  - Acute diastolic CHF, needed few doses of IV lasix, can be changed to oral today, discussed with CT surgery team  - Acute respiratory failure, status post extubation now  - Diabetes, poorly controlled.  lantus continued along with sliding scale now  - Fevers with underlying chronic osteomyelitis, on doxycycline, being followed by ID team        Maxim Morejon MD  4/21/2019  8:23 AM

## 2019-04-21 NOTE — PROGRESS NOTES
Occupational Therapy   Occupational Therapy Initial Assessment  Date: 2019   Patient Name: Carlos Vance  MRN: [id number]     : 1965    Date of Service: 2019    Discharge Recommendations: Further therapy recommended at discharge. The patient should be able to tolerate at least three hours of therapy per day over 5 days or 15 hours over 7 days. Assessment   Performance deficits / Impairments: Decreased functional mobility ; Decreased cognition;Decreased high-level IADLs;Decreased endurance;Decreased ADL status; Decreased balance;Decreased safe awareness;Decreased strength  Prognosis: Good  Decision Making: Medium Complexity  Patient Education: Safety awareness, sternal precautions, sx soap precautions-fair return  REQUIRES OT FOLLOW UP: Yes  Activity Tolerance  Activity Tolerance: Treatment limited secondary to decreased cognition;Patient limited by fatigue;Patient limited by pain  Safety Devices  Safety Devices in place: Yes  Type of devices: Call light within reach; All fall risk precautions in place;Gait belt;Left in chair;Chair alarm in place;Nurse notified  Restraints  Initially in place: No         Patient Diagnosis(es): The primary encounter diagnosis was Acute ST elevation myocardial infarction (STEMI) of anterior wall ([facility] A diagnosis of Cardiac arrest [facility]) was also pertinent to this visit. has a past medical history of CAD (coronary artery disease), Cardiac arrest ([address] Osteolysis, PEA (Pulseless electrical activity) ([address] and PVD (peripheral vascular disease) ([facility] has a past surgical history that includes Cardiac catheterization (2019); Femur Surgery; Tonsillectomy; Coronary artery bypass graft (2019); Hardware Removal (Right, 2019); Tibia fracture surgery (Right); Coronary artery bypass graft (N/A, 2019); and RECONSTRUCTIVE REPAIR STERNAL (N/A, 2019).        Restrictions  Restrictions/Precautions  Restrictions/Precautions: General Precautions, Fall Risk, Surgical Protocols  Required Braces or Orthoses?: No  Position Activity Restriction  Sternal Precautions: No Pushing, No Pulling, 5# Lifting Restrictions  Other position/activity restrictions: Amb pt, up with A    Subjective   General  Patient assessed for rehabilitation services?: Yes  Family / Caregiver Present: Yes(Parents arrived at end of session)  Diagnosis: CABGx4, POD#14, RLE hardware infection  Pain Assessment  Pain Assessment: 0-10  Pain Level: 8  Pain Type: Acute pain  Pain Location: Chest;Knee  Pain Orientation: Right  Pain Descriptors: Aching  Pain Frequency: Continuous  Non-Pharmaceutical Pain Intervention(s): Ambulation/Increased Activity; Distraction; Emotional support; Therapeutic presence  Response to Pain Intervention: Patient Satisfied  Oxygen Therapy  SpO2: 99 %  O2 Device: Nasal cannula  Social/Functional History  Social/Functional History  Lives With: Other (comment)  Type of Home: (Pt is a resident at a substance abuse living facility for past 3 years)  Home Layout: One level  Home Access: Stairs to enter with rails  Entrance Stairs - Number of Steps: 22  ADL Assistance: Independent  Homemaking Assistance: Independent  Homemaking Responsibilities: Yes  Ambulation Assistance: Independent  Transfer Assistance: Independent  Active : No  Patient's  Info: No 's license  Occupation: Part time employment  Type of occupation: Pt performs odd jobs at facility for [employer]"  Additional Comments: 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 Pt does not use any AD or DME.       Objective   Vision: Within Functional Limits  Hearing: Within functional limits    Orientation  Overall Orientation Status: Impaired  Orientation Level: Oriented to person;Disoriented to time;Oriented to place;Oriented to situation(Pt intially stated it was currently 1999, then changed answer with cues to 2019)     Balance  Sitting Balance: Supervision(Impulsive at times, attempted to stand x2)  Standing Balance: Maximum assistance  Standing Balance  Time: 1 min  Activity: Pt stood chairside, strong posterior lean noted, R knee buckling, pt had to be advised to bring RW closer  Functional Mobility  Functional Mobility Comments: OTONIEL d/t poor standing balance  ADL  Feeding: Supervision; Increased time to complete;Setup  Grooming: Stand by assistance;Setup; Increased time to complete  UE Bathing: Minimal assistance;Setup;Verbal cueing; Increased time to complete  LE Bathing: Setup;Verbal cueing; Increased time to complete; Moderate assistance  UE Dressing: Setup; Increased time to complete;Verbal cueing; Moderate assistance  LE Dressing: Setup;Verbal cueing; Increased time to complete;Maximum assistance  Toileting: Setup;Maximum assistance  Additional Comments: Pt's condom cath came off prior to writer arrival, pt's in chair which was saturated with urine, pt stood  chairside and sheets were changed, pt performed full ADL bathing activity seated in chair using sx soap with assist, pt assisted with UB gown change  Tone RUE  RUE Tone: Normotonic  Tone LUE  LUE Tone: Normotonic  Coordination  Movements Are Fluid And Coordinated: No  Coordination and Movement description: Decreased speed  Quality of Movement Other  Comment: Pt's parents state they've noticed RUE weakness over the past few days, pt had difficulty feeding self yesterday per parents     Bed mobility  Supine to Sit: Unable to assess  Sit to Supine: Unable to assess  Scooting: Minimal assistance  Comment: Pt required assist with progressing trunk when scooting forwards/backwards in recliner  Transfers  Sit to stand: Maximum assistance  Stand to sit: Maximum assistance     Cognition  Following Commands: Follows one step commands with increased time; Follows one step commands with repetition  Attention Span: Difficulty dividing attention  Memory: Decreased recall of precautions;Decreased recall of recent events  Safety Judgement: Decreased awareness of need for assistance  Insights: Decreased awareness of deficits  Initiation: Requires cues for some  Sequencing: Requires cues for some     Sensation  Overall Sensation Status: WFL      LUE AROM (degrees)  LUE AROM : WFL  RUE AROM (degrees)  RUE AROM : WFL  LUE Strength  Gross LUE Strength: Exceptions to [facility]  L Shoulder Flex: NT  L Elbow Flex: NT  L Elbow Ext: NT  L Hand Grasp: 5/5  L Hand Release: 5/5  LUE Strength Comment: Not tested d/t sternal precautions  RUE Strength  Gross RUE Strength: Exceptions to [facility]  R Shoulder Flex: NT  R Elbow Flex: NT  R Elbow Ext: NT  R Hand Grasp: 4+/5  R Hand Release: 4+/5      Plan   Plan  Times per week: 5x  Current Treatment Recommendations: Balance Training, Functional Mobility Training, Endurance Training, Equipment Evaluation, Education, & procurement, Home Management Training, Patient/Caregiver Education & Training, Self-Care / ADL, Safety Education & Training, Pain Management, Strengthening     AM-[facility] Inpatient Daily Activity Raw Score: 13  AM-[facility] Inpatient ADL T-Scale Score : 32.03  ADL Inpatient CMS 0-100% Score: 63.03  ADL Inpatient CMS G-Code Modifier : CL    Goals  Short term goals  Time Frame for Short term goals: Pt will by discharge   Short term goal 1: demo ADL UB/LB dressing/bathing activity seated with setup and min A/min vc's  Short term goal 2: demo RUE of 5/5 grossly for use in ADL completion  Short term goal 3: demo good safety awareness during func mob in room with max Ax1 and RW  Short term goal 4: identify all sternal precautions with 2 vc's  Short term goal 5: demo activity tolerance for 30 min  Short term goal 6: demo standing during func activity for 3 min with LRD and mod A     Therapy Time   Individual Concurrent Group Co-treatment   Time In 1153         Time Out 1233         Minutes 25 Brenna Mejia, OTR/L

## 2019-04-21 NOTE — PROGRESS NOTES
INTENSIVE CARE UNIT  Resident Physician Progress Note    Patient - Todd Servin  Date of Admission -  2019  4:48 AM  Date of Evaluation -  2019  Room and Bed Number -  1012/1012-01   Hospital Day - 14      SUBJECTIVE:     OVERNIGHT EVENTS:             Patient tolerated extubation. Denied any shortness of breath or wheezing. AWAKE & FOLLOWING COMMANDS:  [] No   [x] Yes     SECRETIONS Amount:  [x] Small [] Moderate  [] Large  [] None  Color:     [x] White [] Colored  [] Bloody    SEDATION:  RAAS Score:  [] Propofol gtt  [] Versed gtt  [] Ativan gtt   [x] No Sedation . PARALYZED:  [] No    [] Yes    VASOPRESSORS:  [x] No    [] Yes  [] Levophed [] Dopamine [] Vasopressin  [] Dobutamine [] Phenylephrine [] Epinephrine      OBJECTIVE:     VITAL SIGNS:  BP (!) 146/60   Pulse 97   Temp 97.9 °F (36.6 °C) (Oral)   Resp 18   Ht 5' 8\" (1.727 m)   Wt 161 lb 13.1 oz (73.4 kg)   SpO2 96%   BMI 24.60 kg/m²   Tmax over 24 hours:  Temp (24hrs), Av °F (36.7 °C), Min:97.5 °F (36.4 °C), Max:98.4 °F (36.9 °C)      Patient Vitals for the past 8 hrs:   BP Temp Temp src Pulse Resp SpO2   19 1815 (!) 146/60 97.9 °F (36.6 °C) Oral 97 18 96 %   19 1633 (!) 142/60 98.2 °F (36.8 °C) Oral 91 -- --   19 1153 117/67 97.5 °F (36.4 °C) Oral 85 -- 99 %         Intake/Output Summary (Last 24 hours) at 2019 1948  Last data filed at 2019 1740  Gross per 24 hour   Intake 360 ml   Output 2000 ml   Net -1640 ml     Date 19 0000 - 19 2359   Shift 2163-1516 [id number] 7162-4435 24 Hour Total   INTAKE   P.O.(mL/kg/hr)  120(0.2) 240 360   Shift Total(mL/kg)  120(1.6) 240(3.3) 360(4.9)   OUTPUT   Urine(mL/kg/hr) 1500(2.6) 500(0.9)  2000   Shift Total(mL/kg) 1500(20.4) 500(6.8)  0349(07.6)   Weight (kg) 73.4 73.4 73.4 73.4     Wt Readings from Last 3 Encounters:   19 161 lb 13.1 oz (73.4 kg)     Body mass index is 24.6 kg/m².         PHYSICAL EXAM:  Constitutional: Appears well, no distress  EENT: PERRLA,  sclera clear, anicteric, oropharynx clear, no lesions, neck supple with midline trachea. Neck: Supple, symmetrical, trachea midline, no adenopathy, no jvd skin normal  Respiratory: clear to auscultation, no wheezes or rales and unlabored breathing. No intercostal tenderness.   Had course breath sounds bilaterally  Cardiovascular: regular rate and rhythm, normal S1, S2, no murmur noted and 2+ pulses throughout  Abdomen: soft, nontender, nondistended, no masses or organomegaly  Extremities:  peripheral pulses normal, has bilateral pedal edema, no clubbing or cyanosis  MEDICATIONS:  Scheduled Meds:   spironolactone  25 mg Oral Daily    metoprolol tartrate  25 mg Oral BID    insulin lispro  0-12 Units Subcutaneous TID WC    insulin lispro  0-6 Units Subcutaneous Nightly    insulin glargine  30 Units Subcutaneous Daily    lisinopril  5 mg Oral Daily    furosemide  40 mg Intravenous BID    potassium chloride  20 mEq Oral BID    doxycycline hyclate  100 mg Oral 2 times per day    famotidine  20 mg Oral BID    enoxaparin  40 mg Subcutaneous Daily    nystatin  5 mL Oral 4x Daily    aspirin  81 mg Oral Daily    docusate  100 mg Oral Daily    dextrose  12.5 g Intravenous Once    albuterol  2.5 mg Nebulization Q6H    sodium chloride flush  10 mL Intravenous 2 times per day    sodium chloride flush  10 mL Intravenous 2 times per day    polyethylene glycol  17 g Oral Daily    chlorhexidine  15 mL Mouth/Throat BID    therapeutic multivitamin-minerals  1 tablet Oral Daily with breakfast    atorvastatin  40 mg Oral Nightly    clopidogrel  75 mg Oral Daily     Continuous Infusions:   dextrose 5 % and 0.45 % NaCl 75 mL/hr at 04/20/19 0925    sodium chloride      niCARdipine      dextrose       PRN Meds:     potassium chloride 10 mEq PRN   potassium chloride 10 mEq PRN   magnesium hydroxide 30 mL Daily PRN   fentanNYL 25 mcg Q1H PRN   sodium chloride flush 10 mL PRN   labetalol 10 mg Q6H PRN   albuterol 2.5 mg Q6H PRN   sodium chloride flush 10 mL PRN   sodium chloride flush 10 mL PRN   calcium chloride IVPB 1 g PRN   magnesium sulfate 1 g PRN   potassium chloride 20 mEq PRN   acetaminophen 650 mg Q4H PRN   acetaminophen 650 mg Q4H PRN   oxyCODONE-acetaminophen 1 tablet Q4H PRN   Or     oxyCODONE-acetaminophen 2 tablet Q4H PRN   diphenhydrAMINE 25 mg Nightly PRN   bisacodyl 10 mg Daily PRN   ondansetron 4 mg Q8H PRN   hydrALAZINE 5 mg Q5 Min PRN   albumin human 25 g PRN   glucose 15 g PRN   dextrose 12.5 g PRN   glucagon (rDNA) 1 mg PRN   dextrose 100 mL/hr PRN       SUPPORT DEVICES: [] Ventilator [] BIPAP  [x] Nasal Cannula [] Room Air    On 4 L oxygen by nasal cannula    DATA:  Complete Blood Count:   Recent Labs     04/19/19  0643 04/20/19  0452 04/20/19  1650 04/21/19  0502   WBC 20.1* 19.5*  --  18.8*   RBC 2.90* 3.23*  --  2.93*   HGB 8.7* 9.4*  --  8.7*   HCT 28.1* 32.2*  --  29.1*   MCV 96.9 99.7  --  99.3   MCH 30.0 29.1  --  29.7   MCHC 31.0 29.2  --  29.9   RDW 14.6* 14.4  --  13.8   * 527* 509* 472*   MPV 11.9 12.0  --  11.7        Last 3 Blood Glucose:   Recent Labs     04/19/19  0643 04/20/19  0452 04/21/19  0502   GLUCOSE 250* 144* 110*        PT/INR:    Lab Results   Component Value Date    PROTIME 14.1 04/21/2019    INR 1.4 04/21/2019     PTT:    Lab Results   Component Value Date    APTT 25.7 04/07/2019       Comprehensive Metabolic Profile:   Recent Labs     04/19/19  0643 04/20/19  0452 04/21/19  0502   * 152* 140   K 3.5* 3.1* 3.9   * 115* 106   CO2 26 27 25   BUN 24* 24* 16   CREATININE 0.43* 0.50* 0.44*   GLUCOSE 250* 144* 110*   CALCIUM 8.6 8.4* 7.8*   PROT 6.3*  --   --    LABALBU 2.6*  --   --    BILITOT 1.01  --   --    ALKPHOS 206*  --   --    *  --   --    *  --   --       Magnesium:   Lab Results   Component Value Date    MG 1.9 04/21/2019    MG 2.1 04/20/2019    MG 2.0 04/19/2019     Phosphorus: No results found for: PHOS  Ionized Calcium:   Lab Results   Component Value Date    CAION 0.97 04/09/2019    CAION 1.01 04/07/2019        Urinalysis:   Lab Results   Component Value Date    NITRU NEGATIVE 04/14/2019    COLORU DARK YELLOW 04/14/2019    PHUR 5.0 04/14/2019    WBCUA 0 TO 2 04/14/2019    RBCUA 2 TO 5 04/14/2019    MUCUS NOT REPORTED 04/14/2019    TRICHOMONAS NOT REPORTED 04/14/2019    YEAST NOT REPORTED 04/14/2019    BACTERIA NOT REPORTED 04/14/2019    SPECGRAV 1.024 04/14/2019    LEUKOCYTESUR NEGATIVE 04/14/2019    UROBILINOGEN Normal 04/14/2019    BILIRUBINUR NEGATIVE 04/14/2019    GLUCOSEU 3+ 04/14/2019    KETUA NEGATIVE 04/14/2019    AMORPHOUS NOT REPORTED 04/14/2019       HgBA1c:    Lab Results   Component Value Date    LABA1C 10.0 04/17/2019     TSH:  No results found for: TSH    Lactic Acid: No results found for: LACTA   Troponin: No results for input(s): TROPONINI in the last 72 hours. Microbiology:      Other Labs:      Radiology/Imaging:  None today   ASSESSMENT:     Principal Problem:    Cardiac arrest [facility])  Active Problems:    Acute ST elevation myocardial infarction (STEMI) of anterior wall (HCC)    Severe left ventricular systolic dysfunction    Acute respiratory failure with hypoxemia (HCC)    Cardiogenic pulmonary edema (HCC)    PAD (peripheral artery disease) (HCC)    Subacute osteomyelitis of right tibia (HCC)    Exposed orthopaedic hardware ([address]    Metabolic encephalopathy    Abnormal CT of the head    Fever chills    Hepatitis    Acute cerebral infarction associated with systemic hypoxia or ischemia    Multi infarct state    Carotid stenosis, bilateral  Resolved Problems:    * No resolved hospital problems. *  Acute kidney injury, resolved. CVA. Toxic metabolic encephalopathy,improved   Leukocytosis,, stable   Hyperglycemia secondary to stress. Anemia,better   Acute respiratory and metabolic alkalosis. Hypernatremia.,  Better.   Hypokalemia , resolved  Acute systolic congestive heart failure      PLAN: WEAN PER PROTOCOL:  [x] No   [] Yes  [x] N/A    DISCONTINUE ANY LABS:   [] No   [] Yes    ICU PROPHYLAXIS:  Stress ulcer:  [] PPI Agent  [x] [id number] [] Sucralfate  [] Other:  VTE:   [x] Enoxaparin  [] Unfract. Heparin Subcut  [] EPC Cuffs    NUTRITION:  [] NPO [x] Tube Feeding  [] TPN  [] PO    HOME MEDICATIONS RECONCILED: [] No  [] Yes    INSULIN DRIP:   [x] No   [] Yes    CONSULTATION NEEDED:  [] No   [] Yes    FAMILY UPDATED:    [] No   [] Yes    TRANSFER OUT OF ICU:   [] No   [x] Yes    ADDITIONAL PLAN:  Continue nasal cannula oxygen supplementation. Patient was informed of the need for using oxygen. Patient was educated on the importance of compliance and the benefits of oxygen use. Complications of oxygen use, including dryness of the nostrils, epistaxis and also the fire hazard were explained to the patient. Patient willingly accepts to use  the oxygen as recommended  -Patient is on Lasix  -On bronchodilators. Patient is on doxycycline        Please note that this chart was generated using voice recognition dictation software. Although every effort was made to ensure the accuracy of this automated transcription, some errors in transcription may have occurred.          Melissa Lee MD  4/21/2019 7:48 PM

## 2019-04-21 NOTE — PROGRESS NOTES
[facility] - Progress Note    Today's Date and Time: 4/21/2019, 1:36 PM    Impression :   1. CAD with cardiac arrest x 3. (on the field, in ER and in OR)  2. S/P emergent CABG x 4 on 4-7-19.   3. Open chest post CABG. 4. S/p delayed primary closure of chest incision on 4-8-19  5. Improvement in Poor cardiac function with LVEF 10%-->45%  6. Exposed orthopedic hardware Rt leg  7. S/P removal of Protruding orthopedic nail from Rt lateral knee area. Old blood and malodor. 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 8. S/P ORIF Rt proximal tibia. Non compliant with follow up. 9. Fevers  10. Pulmonary edema  11. ALMITA resolved    Recommendations:     · Discontinued ceftaroline. · Started on doxycycline for chronic leg osteomyelitis  · Patient has chronic infection of leg hardware but this is likely not affecting the major problems which include the cardiac arrests and multiple cerebral and cerebellar infarcts      Medical Decision Making/Summary/Discussion:   · Patient admitted with chest pain and cardiac arrests x 3  · Emergent CABG x 4. S/P sternum closure on 4-8-19  · Found to have protruding Rt tibial orthopedic screw from prior ORIF of tibial fracture  · No overt infection of the leg  · Temp 103 on 4-11-19. Repeat blood cultures: No growth . Sputum with mixed oral tabitha. Vancomycin started. · ALMITA with Creatinine 0.4-->1. 19. Vancomycin d/c. Ceftaroline started. · ALMITA resolved  · HIDA scan ordered for elevated LFTs: Normal on 4-15-19  · MRI with occlusion Lt carotid artery and multiple infracts  · Extubated 4-19-19. More responsive  · Has 2 peripheral IVs, will D/C central line (12 days in)  · Rt groin puncture site had some drainage. Wound cleansed on 4-19-19  · Confusion at night 4-20.  Tried to remove tubes  Infection Control Recommendations   · [place] Precautions  · Contact Isolation     Antimicrobial Stewardship Recommendations     · Discontinuation of therapy    Coordination of Outpatient Care:   · Estimated Length of IV antimicrobials: D/C 4-11-19  · Patient will need Midline Catheter Insertion:   · Patient will need PICC line Insertion:  · Patient will need: Home IV , [place],  SNF,  LTAC: TBD  · Patient will need outpatient wound care:TBD    Chief complaint/reason for consultation:   · Emergent CABG  · Protruding orthopedic nail from Rt knee      History of Present Illness:   Channing De La Cruz is a 46y.o.-year-old  male who was initially admitted on 4/7/2019. Patient seen at the request of Charles Mathew. INITIAL HISTORY:     Patient apparently developed chest pain and called EMS. Had cardiac arrest at the scene, was resuscitated and brought to ER where he had second cardiac arrest. Was resuscitated once again and taken to OR for emergent CABG x 4. He had another cardiac arrest in OR. Has survived the above and is back in the ICU. Found to have area of malodor on lateral aspect Rt knee, corresponding to a protruding orthopedic screw from a remote ORIF of the Rt  Tibial plateau. The screw has been removed and sent for culture. X rays show healed fracture of the proximal tibia. There is additional hardware in place that will likely need to be removed if patient survives. CURRENT EVALUATION : 4/21/2019    Patient seen and examined this morning. Extubated 4-19-19    Sitting up in chair this morning. Says he is doing well. Remains somewhat confused with inappropriate response. Clinically with overall improvement. Afebrile  VS stable    Labs reviewed: 4/21/2019    CT of head 4-13-19 showed scattered infarcts of different ages. MRI follow up 4/14 showed multiple new embolic strokes. Absent flow Lt carotid artery. Ultrasound 4-15-19 with occlusion of Lt internal carotid artery. CXR 4-15-19: Bilateral effusions, parahilar and lower zone airspace opacities. Retrocardiac consolidation. Changes are those of CHF.      Na 149 -> 150 -> 140  K 3.5 -> 3.9  WBCs 17.5 -> 25.1 -> 30 0> 20.1 -> 18.8  Hgb 9.1. -->8.7 -> 9.4 -->8.7    ALMITA has resolved with creatinine of 0.44 today. Echocardiogram showed mildly reduced ejection fraction. No clot visualized. Neuroendovascular surgery is planning on cerebral angiogram with possible stenting next week. Cultures:  Wound:  · Orthopedic screw: Leg screw cultures found. Direct exam showed many gram + cocci in clusters and few gram - rods, NGTD. Lab reports quality of sample is questioned and to interpret results carefully. Blood:  · Blood cultures 4/11 x 2 with no growth to date. Urine:  · Urine culture 4/12 with no growth to date. Sputum  · Sputum culture 4/12 with mixed bacterial morphotypes. Discussed with RN, family. I have personally reviewed the past medical history, past surgical history, medications, social history, and family history, and I have updated the database accordingly.   Past Medical History:     Past Medical History:   Diagnosis Date    CAD (coronary artery disease) 04/07/2019    Cardiac arrest ([facility] [address] 04/07/2019    Osteolysis 04/07/2019    R Knee hardware    PEA (Pulseless electrical activity) ([facility] [address] 04/07/2019    PVD (peripheral vascular disease) ([facility] [address] 04/07/2019       Past Surgical  History:     Past Surgical History:   Procedure Laterality Date    CARDIAC CATHETERIZATION  04/07/2019    MVD-Stv    CORONARY ARTERY BYPASS GRAFT  04/07/2019    x4 per Dr. Gorge Leonard, [facility] GRAFT N/A 4/7/2019    EMERGENT CPR,  CORONARY ARTERY BYPASS X4, ON PUMP ,CHEST LEFT OPEN; SWAN, ANGELA PER ANESTHESIA performed by Bonilla Mendieta MD at [address] [facility]      unsure if correct known tibial surgery    HARDWARE REMOVAL Right 04/07/2019    R Knee internal screw protruding through skin    RECONSTRUCTIVE REPAIR STERNAL N/A 4/8/2019    STERNAL  WOUND WASHOUT AND CLOSURE S/P CABG performed by Bonilla Mendieta MD at [address] [facility] FRACTURE SURGERY Right     Fell off roof, external fixation and internal hardware    TONSILLECTOMY         Medications:      spironolactone  25 mg Oral Daily    metoprolol tartrate  25 mg Oral BID    insulin lispro  0-12 Units Subcutaneous TID WC    insulin lispro  0-6 Units Subcutaneous Nightly    insulin glargine  30 Units Subcutaneous Daily    lisinopril  5 mg Oral Daily    furosemide  40 mg Intravenous BID    potassium chloride  20 mEq Oral BID    doxycycline hyclate  100 mg Oral 2 times per day    famotidine  20 mg Oral BID    enoxaparin  40 mg Subcutaneous Daily    nystatin  5 mL Oral 4x Daily    aspirin  81 mg Oral Daily    docusate  100 mg Oral Daily    dextrose  12.5 g Intravenous Once    albuterol  2.5 mg Nebulization Q6H    sodium chloride flush  10 mL Intravenous 2 times per day    sodium chloride flush  10 mL Intravenous 2 times per day    polyethylene glycol  17 g Oral Daily    chlorhexidine  15 mL Mouth/Throat BID    therapeutic multivitamin-minerals  1 tablet Oral Daily with breakfast    atorvastatin  40 mg Oral Nightly    clopidogrel  75 mg Oral Daily       Social History:     Social History     Socioeconomic History    Marital status: Unknown     Spouse name: Not on file    Number of children: Not on file    Years of education: Not on file    Highest education level: Not on file   Occupational History    Not on file   Social Needs    Financial resource strain: Not on file    Food insecurity:     Worry: Not on file     Inability: Not on file    Transportation needs:     Medical: Not on file     Non-medical: Not on file   Tobacco Use    Smoking status: Unknown If Ever Smoked   Substance and Sexual Activity    Alcohol use: Not on file    Drug use: Not on file    Sexual activity: Not on file   Lifestyle    Physical activity:     Days per week: Not on file     Minutes per session: Not on file    Stress: Not on file   Relationships    Social connections:     Talks  Spurring lateral tibia plateau. Proximal tibiofibular syndesmosis.  Visualized femur and patella normal.   Soft tissues unremarkable.           Impression   ORIF proximal tibia.  No acute fracture.         EXAMINATION:   SINGLE XRAY VIEW OF THE CHEST       4/10/2019 6:47 am       COMPARISON:   Chest radiograph performed 04/09/2019.       HISTORY:   ORDERING SYSTEM PROVIDED HISTORY: post op   TECHNOLOGIST PROVIDED HISTORY:   post op       FINDINGS:   There is redemonstration of bilateral pulmonary congestion/edema that is   similar compared to prior.  There are bibasilar effusions. Erlinda Hoots is no   pneumothorax.  The heart is prominent.  The upper abdomen is unremarkable. The extrathoracic soft tissues are unremarkable. Erlinda Hoots is an endotracheal   tube with the tip in the midtrachea.  There is a gastric tube with the tip in   the proximal aspect of the stomach.  The right internal jugular line has been   removed.  The [place] catheter has been removed.           Impression   Cardiomegaly and bilateral pulmonary congestion/edema that is similar   compared to prior.       Bibasilar effusions and stable endotracheal tube and gastric tube.             Medical Decision Making-Other: Thank you for allowing us to participate in the care of this patient. Please call with questions.     Thanh Saavedra MD

## 2019-04-21 NOTE — PROGRESS NOTES
WBC 20.1* 19.5*  --  18.8*   HGB 8.7* 9.4*  --  8.7*   * 527* 509* 472*     BMP:    Recent Labs     04/19/19  0643 04/20/19  0452 04/21/19  0502   * 152* 140   K 3.5* 3.1* 3.9   * 115* 106   CO2 26 27 25   BUN 24* 24* 16   CREATININE 0.43* 0.50* 0.44*   GLUCOSE 250* 144* 110*     Hepatic:   Recent Labs     04/19/19  0643   *   *   BILITOT 1.01   ALKPHOS 206*     Troponin: No results for input(s): TROPONINI in the last 72 hours. BNP: No results for input(s): BNP in the last 72 hours. Lipids:   No results for input(s): CHOL, HDL in the last 72 hours. Invalid input(s): LDLCALCU  INR:   Recent Labs     04/19/19  0754 04/20/19  0452 04/21/19  0502   INR 4.5 1.3 1.4       Objective:   Vitals: /67   Pulse 85   Temp 97.5 °F (36.4 °C) (Oral)   Resp 16   Ht 5' 8\" (1.727 m)   Wt 161 lb 13.1 oz (73.4 kg)   SpO2 99%   BMI 24.60 kg/m²   General appearance: alert and oriented  HEENT: Head: Normocephalic, no lesions,   Bilateral bruist   Neck: no JVD  Lungs:  rales present  Heart: regular rate and rhythm, S1, S2 normal, no murmur, click, rub or gallop  Abdomen: soft, non-tender; bowel sounds normal  Extremities: mild leg edema        EKG: Sinus tachycardia with anteroseptal infarct      Coronary Angiography: 4/7/19  LMCA: has distal 60% stenosis. LAD: has ostial 90% stenosis and long proximal 80% stenosis. D1 has proximal 70% stenosis. LCx: has 20% stenosis. OM1 is very small. OM2 has proximal 90% stenosis. RCA: has proximal 50% stenosis, mid 60% stenosis and distal 80% stenosis. Ramus: has ostial 99% stenosis.     The LV gram was performed in the DUTTON 30 position. LVEF: 15%. LV Wall Motion: severe hypokinesis of the anterior wall and mid inferior wall with apical akinesis. Echo 4/12/19:  Left ventricle is normal in size. Global left ventricular systolic function is mildly reduced. Calculated ejection fraction is 40.   Left ventricular wall thickness is [facility]  [phone]

## 2019-04-22 ENCOUNTER — ANESTHESIA (OUTPATIENT)
Dept: OPERATING ROOM | Age: 54
DRG: 165 | End: 2019-04-22
Payer: MEDICAID

## 2019-04-22 ENCOUNTER — ANESTHESIA EVENT (OUTPATIENT)
Dept: OPERATING ROOM | Age: 54
DRG: 165 | End: 2019-04-22
Payer: MEDICAID

## 2019-04-22 ENCOUNTER — APPOINTMENT (OUTPATIENT)
Dept: GENERAL RADIOLOGY | Age: 54
DRG: 165 | End: 2019-04-22
Payer: MEDICAID

## 2019-04-22 VITALS — OXYGEN SATURATION: 100 % | TEMPERATURE: 98.7 F | SYSTOLIC BLOOD PRESSURE: 91 MMHG | DIASTOLIC BLOOD PRESSURE: 48 MMHG

## 2019-04-22 LAB
ANION GAP SERPL CALCULATED.3IONS-SCNC: 10 MMOL/L (ref 9–17)
ANION GAP SERPL CALCULATED.3IONS-SCNC: 11 MMOL/L (ref 9–17)
BUN BLDV-MCNC: 11 MG/DL (ref 6–20)
BUN/CREAT BLD: ABNORMAL (ref 9–20)
CALCIUM SERPL-MCNC: 7.9 MG/DL (ref 8.6–10.4)
CHLORIDE BLD-SCNC: 101 MMOL/L (ref 98–107)
CHLORIDE BLD-SCNC: 107 MMOL/L (ref 98–107)
CO2: 24 MMOL/L (ref 20–31)
CO2: 25 MMOL/L (ref 20–31)
CREAT SERPL-MCNC: 0.46 MG/DL (ref 0.7–1.2)
GFR AFRICAN AMERICAN: >60 ML/MIN
GFR NON-AFRICAN AMERICAN: >60 ML/MIN
GFR SERPL CREATININE-BSD FRML MDRD: ABNORMAL ML/MIN/{1.73_M2}
GFR SERPL CREATININE-BSD FRML MDRD: ABNORMAL ML/MIN/{1.73_M2}
GLUCOSE BLD-MCNC: 108 MG/DL (ref 75–110)
GLUCOSE BLD-MCNC: 117 MG/DL (ref 75–110)
GLUCOSE BLD-MCNC: 132 MG/DL (ref 70–99)
GLUCOSE BLD-MCNC: 147 MG/DL (ref 75–110)
GLUCOSE BLD-MCNC: 203 MG/DL (ref 75–110)
HCT VFR BLD CALC: 29 % (ref 40.7–50.3)
HEMOGLOBIN: 8.9 G/DL (ref 13–17)
INR BLD: 1.3
MAGNESIUM: 1.9 MG/DL (ref 1.6–2.6)
MAGNESIUM: 2 MG/DL (ref 1.6–2.6)
MCH RBC QN AUTO: 29.6 PG (ref 25.2–33.5)
MCHC RBC AUTO-ENTMCNC: 30.7 G/DL (ref 28.4–34.8)
MCV RBC AUTO: 96.3 FL (ref 82.6–102.9)
NRBC AUTOMATED: 0 PER 100 WBC
PDW BLD-RTO: 13.5 % (ref 11.8–14.4)
PLATELET # BLD: 530 K/UL (ref 138–453)
PMV BLD AUTO: 11.9 FL (ref 8.1–13.5)
POTASSIUM SERPL-SCNC: 3 MMOL/L (ref 3.7–5.3)
POTASSIUM SERPL-SCNC: 3.2 MMOL/L (ref 3.7–5.3)
PROTHROMBIN TIME: 13.1 SEC (ref 9–12)
RBC # BLD: 3.01 M/UL (ref 4.21–5.77)
SODIUM BLD-SCNC: 136 MMOL/L (ref 135–144)
SODIUM BLD-SCNC: 142 MMOL/L (ref 135–144)
WBC # BLD: 16.5 K/UL (ref 3.5–11.3)

## 2019-04-22 PROCEDURE — 6360000002 HC RX W HCPCS: Performed by: STUDENT IN AN ORGANIZED HEALTH CARE EDUCATION/TRAINING PROGRAM

## 2019-04-22 PROCEDURE — 6370000000 HC RX 637 (ALT 250 FOR IP): Performed by: PHYSICIAN ASSISTANT

## 2019-04-22 PROCEDURE — 0QBG0ZZ EXCISION OF RIGHT TIBIA, OPEN APPROACH: ICD-10-PCS | Performed by: ORTHOPAEDIC SURGERY

## 2019-04-22 PROCEDURE — 6370000000 HC RX 637 (ALT 250 FOR IP): Performed by: THORACIC SURGERY (CARDIOTHORACIC VASCULAR SURGERY)

## 2019-04-22 PROCEDURE — 97530 THERAPEUTIC ACTIVITIES: CPT

## 2019-04-22 PROCEDURE — 97116 GAIT TRAINING THERAPY: CPT

## 2019-04-22 PROCEDURE — 6370000000 HC RX 637 (ALT 250 FOR IP): Performed by: STUDENT IN AN ORGANIZED HEALTH CARE EDUCATION/TRAINING PROGRAM

## 2019-04-22 PROCEDURE — 3700000000 HC ANESTHESIA ATTENDED CARE: Performed by: ORTHOPAEDIC SURGERY

## 2019-04-22 PROCEDURE — 36415 COLL VENOUS BLD VENIPUNCTURE: CPT

## 2019-04-22 PROCEDURE — 80048 BASIC METABOLIC PNL TOTAL CA: CPT

## 2019-04-22 PROCEDURE — 94760 N-INVAS EAR/PLS OXIMETRY 1: CPT

## 2019-04-22 PROCEDURE — 87205 SMEAR GRAM STAIN: CPT

## 2019-04-22 PROCEDURE — 2580000003 HC RX 258: Performed by: NURSE PRACTITIONER

## 2019-04-22 PROCEDURE — 0QPGX5Z REMOVAL OF EXTERNAL FIXATION DEVICE FROM RIGHT TIBIA, EXTERNAL APPROACH: ICD-10-PCS | Performed by: ORTHOPAEDIC SURGERY

## 2019-04-22 PROCEDURE — 2580000003 HC RX 258: Performed by: STUDENT IN AN ORGANIZED HEALTH CARE EDUCATION/TRAINING PROGRAM

## 2019-04-22 PROCEDURE — 2709999900 HC NON-CHARGEABLE SUPPLY: Performed by: ORTHOPAEDIC SURGERY

## 2019-04-22 PROCEDURE — 97110 THERAPEUTIC EXERCISES: CPT

## 2019-04-22 PROCEDURE — 97127 HC SP THER IVNTJ W/FOCUS COG FUNCJ: CPT

## 2019-04-22 PROCEDURE — 3600000014 HC SURGERY LEVEL 4 ADDTL 15MIN: Performed by: ORTHOPAEDIC SURGERY

## 2019-04-22 PROCEDURE — 2700000000 HC OXYGEN THERAPY PER DAY

## 2019-04-22 PROCEDURE — 2580000003 HC RX 258: Performed by: ORTHOPAEDIC SURGERY

## 2019-04-22 PROCEDURE — 99232 SBSQ HOSP IP/OBS MODERATE 35: CPT | Performed by: INTERNAL MEDICINE

## 2019-04-22 PROCEDURE — 85610 PROTHROMBIN TIME: CPT

## 2019-04-22 PROCEDURE — 3700000001 HC ADD 15 MINUTES (ANESTHESIA): Performed by: ORTHOPAEDIC SURGERY

## 2019-04-22 PROCEDURE — 6360000002 HC RX W HCPCS: Performed by: THORACIC SURGERY (CARDIOTHORACIC VASCULAR SURGERY)

## 2019-04-22 PROCEDURE — 80051 ELECTROLYTE PANEL: CPT

## 2019-04-22 PROCEDURE — 3600000004 HC SURGERY LEVEL 4 BASE: Performed by: ORTHOPAEDIC SURGERY

## 2019-04-22 PROCEDURE — 2000000000 HC ICU R&B

## 2019-04-22 PROCEDURE — 87176 TISSUE HOMOGENIZATION CULTR: CPT

## 2019-04-22 PROCEDURE — 87075 CULTR BACTERIA EXCEPT BLOOD: CPT

## 2019-04-22 PROCEDURE — 7100000001 HC PACU RECOVERY - ADDTL 15 MIN: Performed by: ORTHOPAEDIC SURGERY

## 2019-04-22 PROCEDURE — 94668 MNPJ CHEST WALL SBSQ: CPT

## 2019-04-22 PROCEDURE — 87070 CULTURE OTHR SPECIMN AEROBIC: CPT

## 2019-04-22 PROCEDURE — 97535 SELF CARE MNGMENT TRAINING: CPT

## 2019-04-22 PROCEDURE — 6370000000 HC RX 637 (ALT 250 FOR IP): Performed by: FAMILY MEDICINE

## 2019-04-22 PROCEDURE — 2500000003 HC RX 250 WO HCPCS: Performed by: NURSE ANESTHETIST, CERTIFIED REGISTERED

## 2019-04-22 PROCEDURE — 6370000000 HC RX 637 (ALT 250 FOR IP): Performed by: NURSE PRACTITIONER

## 2019-04-22 PROCEDURE — 99024 POSTOP FOLLOW-UP VISIT: CPT | Performed by: PHYSICIAN ASSISTANT

## 2019-04-22 PROCEDURE — 94640 AIRWAY INHALATION TREATMENT: CPT

## 2019-04-22 PROCEDURE — 86403 PARTICLE AGGLUT ANTBDY SCRN: CPT

## 2019-04-22 PROCEDURE — 83735 ASSAY OF MAGNESIUM: CPT

## 2019-04-22 PROCEDURE — 7100000000 HC PACU RECOVERY - FIRST 15 MIN: Performed by: ORTHOPAEDIC SURGERY

## 2019-04-22 PROCEDURE — 6360000002 HC RX W HCPCS: Performed by: NURSE PRACTITIONER

## 2019-04-22 PROCEDURE — 2580000003 HC RX 258: Performed by: NURSE ANESTHETIST, CERTIFIED REGISTERED

## 2019-04-22 PROCEDURE — 82947 ASSAY GLUCOSE BLOOD QUANT: CPT

## 2019-04-22 PROCEDURE — 6370000000 HC RX 637 (ALT 250 FOR IP): Performed by: INTERNAL MEDICINE

## 2019-04-22 PROCEDURE — 6360000002 HC RX W HCPCS: Performed by: NURSE ANESTHETIST, CERTIFIED REGISTERED

## 2019-04-22 PROCEDURE — 73590 X-RAY EXAM OF LOWER LEG: CPT

## 2019-04-22 PROCEDURE — 85027 COMPLETE CBC AUTOMATED: CPT

## 2019-04-22 RX ORDER — METOPROLOL TARTRATE 50 MG/1
50 TABLET, FILM COATED ORAL 2 TIMES DAILY
Status: DISCONTINUED | OUTPATIENT
Start: 2019-04-22 | End: 2019-04-25 | Stop reason: HOSPADM

## 2019-04-22 RX ORDER — ROCURONIUM BROMIDE 10 MG/ML
INJECTION, SOLUTION INTRAVENOUS PRN
Status: DISCONTINUED | OUTPATIENT
Start: 2019-04-22 | End: 2019-04-22 | Stop reason: SDUPTHER

## 2019-04-22 RX ORDER — FUROSEMIDE 40 MG/1
40 TABLET ORAL 2 TIMES DAILY
Status: DISCONTINUED | OUTPATIENT
Start: 2019-04-22 | End: 2019-04-25 | Stop reason: HOSPADM

## 2019-04-22 RX ORDER — POTASSIUM CHLORIDE 20 MEQ/1
40 TABLET, EXTENDED RELEASE ORAL 2 TIMES DAILY WITH MEALS
Status: DISCONTINUED | OUTPATIENT
Start: 2019-04-22 | End: 2019-04-25 | Stop reason: HOSPADM

## 2019-04-22 RX ORDER — POTASSIUM CHLORIDE 20 MEQ/1
40 TABLET, EXTENDED RELEASE ORAL PRN
Status: DISCONTINUED | OUTPATIENT
Start: 2019-04-22 | End: 2019-04-25 | Stop reason: HOSPADM

## 2019-04-22 RX ORDER — POTASSIUM CHLORIDE 7.45 MG/ML
10 INJECTION INTRAVENOUS PRN
Status: DISCONTINUED | OUTPATIENT
Start: 2019-04-22 | End: 2019-04-25 | Stop reason: HOSPADM

## 2019-04-22 RX ORDER — SODIUM CHLORIDE, SODIUM LACTATE, POTASSIUM CHLORIDE, CALCIUM CHLORIDE 600; 310; 30; 20 MG/100ML; MG/100ML; MG/100ML; MG/100ML
INJECTION, SOLUTION INTRAVENOUS CONTINUOUS PRN
Status: DISCONTINUED | OUTPATIENT
Start: 2019-04-22 | End: 2019-04-22 | Stop reason: SDUPTHER

## 2019-04-22 RX ORDER — FENTANYL CITRATE 50 UG/ML
INJECTION, SOLUTION INTRAMUSCULAR; INTRAVENOUS PRN
Status: DISCONTINUED | OUTPATIENT
Start: 2019-04-22 | End: 2019-04-22 | Stop reason: SDUPTHER

## 2019-04-22 RX ORDER — LIDOCAINE HYDROCHLORIDE 10 MG/ML
INJECTION, SOLUTION EPIDURAL; INFILTRATION; INTRACAUDAL; PERINEURAL PRN
Status: DISCONTINUED | OUTPATIENT
Start: 2019-04-22 | End: 2019-04-22 | Stop reason: SDUPTHER

## 2019-04-22 RX ORDER — NEOSTIGMINE METHYLSULFATE 5 MG/5 ML
SYRINGE (ML) INTRAVENOUS PRN
Status: DISCONTINUED | OUTPATIENT
Start: 2019-04-22 | End: 2019-04-22 | Stop reason: SDUPTHER

## 2019-04-22 RX ORDER — MAGNESIUM HYDROXIDE 1200 MG/15ML
LIQUID ORAL CONTINUOUS PRN
Status: COMPLETED | OUTPATIENT
Start: 2019-04-22 | End: 2019-04-22

## 2019-04-22 RX ORDER — GLYCOPYRROLATE 1 MG/5 ML
SYRINGE (ML) INTRAVENOUS PRN
Status: DISCONTINUED | OUTPATIENT
Start: 2019-04-22 | End: 2019-04-22 | Stop reason: SDUPTHER

## 2019-04-22 RX ORDER — SPIRONOLACTONE 25 MG/1
25 TABLET ORAL DAILY
Status: DISCONTINUED | OUTPATIENT
Start: 2019-04-22 | End: 2019-04-22 | Stop reason: SDUPTHER

## 2019-04-22 RX ORDER — PROPOFOL 10 MG/ML
INJECTION, EMULSION INTRAVENOUS PRN
Status: DISCONTINUED | OUTPATIENT
Start: 2019-04-22 | End: 2019-04-22 | Stop reason: SDUPTHER

## 2019-04-22 RX ORDER — ONDANSETRON 2 MG/ML
INJECTION INTRAMUSCULAR; INTRAVENOUS PRN
Status: DISCONTINUED | OUTPATIENT
Start: 2019-04-22 | End: 2019-04-22 | Stop reason: SDUPTHER

## 2019-04-22 RX ADMIN — DEXTROSE AND SODIUM CHLORIDE: 5; 450 INJECTION, SOLUTION INTRAVENOUS at 17:50

## 2019-04-22 RX ADMIN — POTASSIUM CHLORIDE 10 MEQ: 7.46 INJECTION, SOLUTION INTRAVENOUS at 08:39

## 2019-04-22 RX ADMIN — DOXYCYCLINE HYCLATE 100 MG: 100 TABLET, COATED ORAL at 20:04

## 2019-04-22 RX ADMIN — ASPIRIN 81 MG 81 MG: 81 TABLET ORAL at 08:30

## 2019-04-22 RX ADMIN — ALBUTEROL SULFATE 2.5 MG: 2.5 SOLUTION RESPIRATORY (INHALATION) at 19:59

## 2019-04-22 RX ADMIN — METOPROLOL TARTRATE 25 MG: 25 TABLET ORAL at 08:35

## 2019-04-22 RX ADMIN — CLOPIDOGREL 75 MG: 75 TABLET, FILM COATED ORAL at 08:31

## 2019-04-22 RX ADMIN — Medication 0.4 MG: at 16:02

## 2019-04-22 RX ADMIN — PROPOFOL 100 MG: 10 INJECTION, EMULSION INTRAVENOUS at 15:34

## 2019-04-22 RX ADMIN — Medication 2 G: at 15:43

## 2019-04-22 RX ADMIN — OXYCODONE HYDROCHLORIDE AND ACETAMINOPHEN 2 TABLET: 5; 325 TABLET ORAL at 21:09

## 2019-04-22 RX ADMIN — DOXYCYCLINE HYCLATE 100 MG: 100 TABLET, COATED ORAL at 08:32

## 2019-04-22 RX ADMIN — FENTANYL CITRATE 100 MCG: 50 INJECTION INTRAMUSCULAR; INTRAVENOUS at 15:34

## 2019-04-22 RX ADMIN — Medication 10 ML: at 20:08

## 2019-04-22 RX ADMIN — ALBUTEROL SULFATE 2.5 MG: 2.5 SOLUTION RESPIRATORY (INHALATION) at 03:29

## 2019-04-22 RX ADMIN — Medication 500000 UNITS: at 20:09

## 2019-04-22 RX ADMIN — DESMOPRESSIN ACETATE 40 MG: 0.2 TABLET ORAL at 20:04

## 2019-04-22 RX ADMIN — Medication 15 ML: at 08:31

## 2019-04-22 RX ADMIN — INSULIN LISPRO 2 UNITS: 100 INJECTION, SOLUTION INTRAVENOUS; SUBCUTANEOUS at 21:54

## 2019-04-22 RX ADMIN — METOPROLOL TARTRATE 50 MG: 25 TABLET ORAL at 20:09

## 2019-04-22 RX ADMIN — LISINOPRIL 5 MG: 5 TABLET ORAL at 08:35

## 2019-04-22 RX ADMIN — ROCURONIUM BROMIDE 20 MG: 10 INJECTION INTRAVENOUS at 15:34

## 2019-04-22 RX ADMIN — Medication 500000 UNITS: at 08:36

## 2019-04-22 RX ADMIN — FUROSEMIDE 40 MG: 10 INJECTION, SOLUTION INTRAMUSCULAR; INTRAVENOUS at 08:33

## 2019-04-22 RX ADMIN — ONDANSETRON 4 MG: 2 INJECTION, SOLUTION INTRAMUSCULAR; INTRAVENOUS at 15:51

## 2019-04-22 RX ADMIN — ALBUTEROL SULFATE 2.5 MG: 2.5 SOLUTION RESPIRATORY (INHALATION) at 13:37

## 2019-04-22 RX ADMIN — LIDOCAINE HYDROCHLORIDE 50 MG: 10 INJECTION, SOLUTION EPIDURAL; INFILTRATION; INTRACAUDAL; PERINEURAL at 15:34

## 2019-04-22 RX ADMIN — Medication 15 ML: at 21:54

## 2019-04-22 RX ADMIN — Medication 100 MG: at 08:32

## 2019-04-22 RX ADMIN — FAMOTIDINE 20 MG: 20 TABLET, FILM COATED ORAL at 08:33

## 2019-04-22 RX ADMIN — SODIUM CHLORIDE, POTASSIUM CHLORIDE, SODIUM LACTATE AND CALCIUM CHLORIDE: 600; 310; 30; 20 INJECTION, SOLUTION INTRAVENOUS at 15:26

## 2019-04-22 RX ADMIN — Medication 2 MG: at 16:02

## 2019-04-22 RX ADMIN — POTASSIUM BICARBONATE 40 MEQ: 782 TABLET, EFFERVESCENT ORAL at 21:50

## 2019-04-22 RX ADMIN — Medication 10 ML: at 08:40

## 2019-04-22 RX ADMIN — SPIRONOLACTONE 25 MG: 25 TABLET ORAL at 08:40

## 2019-04-22 RX ADMIN — DIPHENHYDRAMINE HCL 25 MG: 25 TABLET ORAL at 21:09

## 2019-04-22 RX ADMIN — POTASSIUM CHLORIDE 10 MEQ: 7.46 INJECTION, SOLUTION INTRAVENOUS at 11:23

## 2019-04-22 RX ADMIN — Medication 500000 UNITS: at 12:06

## 2019-04-22 RX ADMIN — POTASSIUM CHLORIDE 10 MEQ: 7.46 INJECTION, SOLUTION INTRAVENOUS at 12:28

## 2019-04-22 RX ADMIN — FAMOTIDINE 20 MG: 20 TABLET, FILM COATED ORAL at 20:04

## 2019-04-22 RX ADMIN — ALBUTEROL SULFATE 2.5 MG: 2.5 SOLUTION RESPIRATORY (INHALATION) at 07:37

## 2019-04-22 RX ADMIN — MULTIPLE VITAMINS W/ MINERALS TAB 1 TABLET: TAB at 08:41

## 2019-04-22 ASSESSMENT — PULMONARY FUNCTION TESTS
PIF_VALUE: 0
PIF_VALUE: 16
PIF_VALUE: 26
PIF_VALUE: 19
PIF_VALUE: 1
PIF_VALUE: 19
PIF_VALUE: 2
PIF_VALUE: 5
PIF_VALUE: 19
PIF_VALUE: 3
PIF_VALUE: 19
PIF_VALUE: 3
PIF_VALUE: 0
PIF_VALUE: 16
PIF_VALUE: 10
PIF_VALUE: 1
PIF_VALUE: 16
PIF_VALUE: 20
PIF_VALUE: 19
PIF_VALUE: 0
PIF_VALUE: 5
PIF_VALUE: 19
PIF_VALUE: 39
PIF_VALUE: 16
PIF_VALUE: 4
PIF_VALUE: 2
PIF_VALUE: 19
PIF_VALUE: 16
PIF_VALUE: 19
PIF_VALUE: 28
PIF_VALUE: 19
PIF_VALUE: 19
PIF_VALUE: 23
PIF_VALUE: 19
PIF_VALUE: 1
PIF_VALUE: 19
PIF_VALUE: 0
PIF_VALUE: 16
PIF_VALUE: 0
PIF_VALUE: 1
PIF_VALUE: 19
PIF_VALUE: 0
PIF_VALUE: 0
PIF_VALUE: 20
PIF_VALUE: 19
PIF_VALUE: 22
PIF_VALUE: 20
PIF_VALUE: 19
PIF_VALUE: 0
PIF_VALUE: 16
PIF_VALUE: 1
PIF_VALUE: 19

## 2019-04-22 ASSESSMENT — PAIN DESCRIPTION - LOCATION: LOCATION: LEG

## 2019-04-22 ASSESSMENT — ENCOUNTER SYMPTOMS
VOMITING: 0
COUGH: 0
ABDOMINAL PAIN: 0
NAUSEA: 0
SHORTNESS OF BREATH: 0
SHORTNESS OF BREATH: 0
STRIDOR: 0

## 2019-04-22 ASSESSMENT — PAIN SCALES - GENERAL
PAINLEVEL_OUTOF10: 8
PAINLEVEL_OUTOF10: 0

## 2019-04-22 ASSESSMENT — PAIN DESCRIPTION - PAIN TYPE: TYPE: SURGICAL PAIN

## 2019-04-22 ASSESSMENT — PAIN DESCRIPTION - DESCRIPTORS: DESCRIPTORS: DISCOMFORT

## 2019-04-22 ASSESSMENT — PAIN DESCRIPTION - ORIENTATION: ORIENTATION: RIGHT

## 2019-04-22 ASSESSMENT — PAIN - FUNCTIONAL ASSESSMENT: PAIN_FUNCTIONAL_ASSESSMENT: 0-10

## 2019-04-22 NOTE — PROGRESS NOTES
Smoking Cessation - topics covered   []  Health Risks  []  Benefits of Quitting   []  Smoking Cessation  []  Patient has no history of tobacco use  [x]  Patient is former smoker. [x]  No need for tobacco cessation education. []  Booklet given  []  Patient verbalizes understanding. []  Patient denies need for tobacco cessation education. []  Unable to meet with patient today. Will follow up as able.   Jazlyn Pack  4:08 PM

## 2019-04-22 NOTE — PROGRESS NOTES
Pt.'s chair alarm went off, found pt sitting on ground. Pt had a tommy vest that was still half on. Put pt back in bed with full support, pt denied any pain, re oriented pt to place. Pt stated he was trying to get his phone cord. Charge RN at bedside, notifying primary service, and obtaining a telesitter.

## 2019-04-22 NOTE — BRIEF OP NOTE
Brief Postoperative Note  ______________________________________________________________    Patient: Abimael Deal  YOB: 1965  MRN: [id number]  Date of Procedure: 4/22/2019    Pre-Op Diagnosis: INFECTED HARDWARE RIGHT TIBIA    Post-Op Diagnosis: Same       Procedure(s):  HARDWARE REMOVAL RIGHT TIBIA, I&D WITH SHARP EXCISIONAL DEBRIDEMENT OF SUBCUTANEOUS TISSUE AND BONE    Anesthesia: General    Surgeon(s):  MD Sowmya Romero Pain, DO PGY-2    Fluids: 350 cc crystalloid    Estimated Blood Loss (mL): 10 mL    Complications: None    Specimens:   ID Type Source Tests Collected by Time Destination   1 : right leg  Tissue Leg ANAEROBIC AND AEROBIC CULTURE Mayda Bryant MD 4/22/2019 1608      Implants: None      Drains:   [REMOVED] Chest Tube 1 Anterior 28 Nepali (Removed)   Suction -20 cm H2O 4/9/2019  4:00 AM   Chest Tube Airleak No 4/9/2019  4:00 AM   Drainage Description Sanguinous 4/9/2019  4:00 AM   Dressing Status Clean;Dry; Intact 4/9/2019  4:00 AM   Dressing Type Dry dressing 4/9/2019  4:00 AM   Dressing Change Due 04/09/19 4/9/2019  4:00 AM   Site Assessment Clean;Dry; Intact 4/9/2019  4:00 AM   Surrounding Skin Dry; Intact 4/9/2019  4:00 AM   Patency Intervention Tip/Tilt 4/8/2019  6:02 PM   Output (ml) 0 ml 4/9/2019  6:00 AM       [REMOVED] Chest Tube 2 Anterior 28 Nepali (Removed)   Suction -20 cm H2O 4/9/2019  4:00 AM   Chest Tube Airleak No 4/9/2019  4:00 AM   Drainage Description Sanguinous 4/9/2019  4:00 AM   Dressing Status Clean;Dry; Intact 4/9/2019  4:00 AM   Dressing Type Dry dressing 4/9/2019  4:00 AM   Dressing Change Due 04/09/19 4/9/2019  4:00 AM   Site Assessment Clean;Dry; Intact 4/9/2019  4:00 AM   Surrounding Skin Dry; Intact 4/9/2019  4:00 AM   Patency Intervention Tip/Tilt 4/8/2019  6:02 PM   Output (ml) 0 ml 4/9/2019  6:00 AM       [REMOVED] Negative Pressure Wound Therapy Leg Left;Upper (Removed)   $ Standard NPWT >50 sq cm PER TX $ Yes 4/17/2019  8:00 AM   Wound PM       Findings: infected deep orthopedic hardware, see op note for full details    Neil Bryan DO  Date: 4/22/2019  Time: 4:18 PM

## 2019-04-22 NOTE — PROGRESS NOTES
Physical Therapy  DATE: 2019    NAME: Jennifer Murray  MRN: [id number]   : 1965    Patient not seen this date for Physical Therapy due to:  [] Blood transfusion in progress  [] Hemodialysis  []  Patient Declined  [] Spine Precautions   [] Strict Bedrest  [x] Surgery  -  Hardware removal. Will check on pt tomorrow. [] Testing      [] Other        [] PT being discontinued at this time. Patient independent. No further needs. [] PT being discontinued at this time as the patient has been transferred to palliative care. No further needs.     Kyle Irwin, PTA

## 2019-04-22 NOTE — PROGRESS NOTES
[facility]  Daily Progress Note    Surgeon:  Dr. Muller Husbands  S/P :  Emerg Coronary artery bypass 4/7/19  Chest closure 4/8/19  EF: 45 %    Subjective:  Mr. Howe Rising extubated yesterday. Physical Exam  Vital Signs: BP (!) 151/84   Pulse 89   Temp 98.7 °F (37.1 °C) (Oral)   Resp 16   Ht 5' 8\" (1.727 m)   Wt 161 lb 2.5 oz (73.1 kg)   SpO2 97%   BMI 24.50 kg/m²  O2 Flow Rate (L/min): 4 L/min   Admit Weight: Weight: 176 lb 5.9 oz (80 kg)   WEIGHTWeight: 161 lb 2.5 oz (73.1 kg)     General: in no acute distress. Resting in bed  Heart:Normal S1 and S2.  Regular rhythm. No murmurs, gallops, or rubs. Pacing Wires Yes   Lungs: clear to auscultation bilaterally  Abdomen: soft, non tender, non distended, BSx4  Extremities: negative  Wounds: clean and dry, healing appropriately. Pravena to left leg.      Scheduled Meds:    ceFAZolin  2 g Intravenous On Call to OR    spironolactone  25 mg Oral Daily    metoprolol tartrate  25 mg Oral BID    insulin lispro  0-12 Units Subcutaneous TID     insulin lispro  0-6 Units Subcutaneous Nightly    insulin glargine  30 Units Subcutaneous Daily    lisinopril  5 mg Oral Daily    furosemide  40 mg Intravenous BID    potassium chloride  20 mEq Oral BID    doxycycline hyclate  100 mg Oral 2 times per day    famotidine  20 mg Oral BID    enoxaparin  40 mg Subcutaneous Daily    nystatin  5 mL Oral 4x Daily    aspirin  81 mg Oral Daily    docusate  100 mg Oral Daily    dextrose  12.5 g Intravenous Once    albuterol  2.5 mg Nebulization Q6H    sodium chloride flush  10 mL Intravenous 2 times per day    sodium chloride flush  10 mL Intravenous 2 times per day    polyethylene glycol  17 g Oral Daily    chlorhexidine  15 mL Mouth/Throat BID    therapeutic multivitamin-minerals  1 tablet Oral Daily with breakfast    atorvastatin  40 mg Oral Nightly    clopidogrel  75 mg Oral Daily     Continuous Infusions:    dextrose 5 % and 0.45 % NaCl 75 mL/hr at 04/20/19 0925    sodium chloride      niCARdipine      dextrose         Data:  CBC:   Recent Labs     04/20/19  0452 04/20/19  1650 04/21/19  0502 04/22/19  0540   WBC 19.5*  --  18.8* 16.5*   HGB 9.4*  --  8.7* 8.9*   HCT 32.2*  --  29.1* 29.0*   MCV 99.7  --  99.3 96.3   * 509* 472* 530*     BMP:   Recent Labs     04/20/19  0452 04/21/19  0502 04/22/19  0540   * 140 142   K 3.1* 3.9 3.0*   * 106 107   CO2 27 25 25   BUN 24* 16 11   CREATININE 0.50* 0.44* 0.46*     PT/INR:   Recent Labs     04/20/19  0452 04/21/19  0502 04/22/19  0540   PROTIME 13.5* 14.1* 13.1*   INR 1.3 1.4 1.3     APTT: No results for input(s): APTT in the last 72 hours. Chest X-Ray: 4/17/19:   ET tube in good position.  Enteric tube passes beneath the diaphragm.  Intact   sternotomy wires.  Cardiac leads overlie the chest.  Enlarged heart with   moderate pulmonary edema.  Bilateral pleural effusions.  No pneumothorax.           Impression   Stable moderate pulmonary edema and effusions.       Stable supporting devices. I/O:  I/O last 3 completed shifts: In: 12 [P.O.:960]  Out: 950 [Urine:950]      Assessment & Plan:   Patient Active Problem List   Diagnosis    Cardiac arrest ([facility]    Acute ST elevation myocardial infarction (STEMI) of anterior wall (HCC)    Severe left ventricular systolic dysfunction    Acute respiratory failure with hypoxemia (HCC)    Cardiogenic pulmonary edema (HCC)    [facility]ipheral [facility]    Subacute osteomyelitis of right tibia ([facility] [address] [facility]    Exposed orthopaedic hardware ([facility] [address] [facility]    Metabolic encephalopathy    Abnormal CT of the head    Fever chills    Hepatitis    Acute cerebral infarction associated with systemic hypoxia or ischemia    Multi infarct state    Carotid stenosis, bilateral     1.  S/p CABG Emergent  POD 15  Plan cerebral angio and possible LU stent  Swallow study - passed with dental soft diet  Ortho for right prosthesis removal    DVT ppx: Lovenox & SCD's while stationary  Patient is on BB, ASA, Statin therapy per protocol   Plan for discharge SNF/rehab    The above recommendations including medications and orders were discussed and agreed upon with Dr. Melo Perez, the attending on service for the cardiothoracic surgery group today.      Electronically signed by Halina Saint, PA on 4/22/2019 at 7:41 AM

## 2019-04-22 NOTE — ANESTHESIA POSTPROCEDURE EVALUATION
Department of Anesthesiology  Postprocedure Note    Patient: Abe Hairston  MRN: [id number]  YOB: 1965  Date of evaluation: 4/22/2019  Time:  7:07 PM     Procedure Summary     Date:  04/22/19 Room / Location:  [facility] OR  / [facility] OR    Anesthesia Start:  1526 Anesthesia Stop:  1626    Procedure:  HARDWARE REMOVAL RIGHT TIBIA, IRRIGATION AND DEBRIDEMENT RIGH TIBIA (N/A ) Diagnosis:  (INFECTED HARDWARE RIGHT TIBIA)    Surgeon:  Shahida Asif MD Responsible Provider:  Shilpa Hirsch MD    Anesthesia Type:  general ASA Status:  4          Anesthesia Type: general    Gricelda Phase I: Gricelda Score: 9    Gricelda Phase II:      Last vitals: Reviewed and per EMR flowsheets.        Anesthesia Post Evaluation    Patient location during evaluation: PACU  Patient participation: complete - patient participated  Level of consciousness: awake  Pain score: 0  Nausea & Vomiting: no nausea  Cardiovascular status: hemodynamically stable  Respiratory status: nasal cannula  Hydration status: euvolemic

## 2019-04-22 NOTE — PROGRESS NOTES
[facility]    Progress Note    4/22/2019    6:48 PM    Name:   Todd Servin  MRN:     [id number]     Acct:      [de-identified]   Room:   ST OR [place] RM/NONE  IP Day:  13  Admit Date:  4/7/2019  4:48 AM    PCP:   No primary care provider on file. Code Status:  Full Code    Subjective:     C/C:   Chief Complaint   Patient presents with    Cardiac Arrest     diff breather, brought in on cpap with elevations in EKG on EMS arrival.      Interval History Status:  Up to chair  Feeling better  Stronger  No chest pain  No shortness of breath    Database updates:  WBC 16. 5High k/uL RBC 3.01Low m/uL Hemoglobin 8.9Low     Glucose 132High mg/dL     BUN 11 mg/dL   CREATININE 0.46Low     Potassium 3.0Low         Brief History:     As documented in the medical record: \"The patient is a 46 y.o. male who is admitted for  cardiac arrest and STEMI. Patient was admitted on 4/7/19 after he was brought in by EMS for dyspnea, acute respiratory failure. Patient was placed on CPAP and nebulization treatment. Initial EKG showed possible anterior septal STEMI. On arrival to [facility] emergency room patient had PEA arrest and was resuscitated per ACS protocol with 3 rounds of CPR and epinephrine with successful ROSC. Patient underwent emergent cardiac cath and showed multivessel CAD with severe LV dysfunction with EF 15% and severe hypokinesis. Patient had emergent CABG x 4 on 4/7/19 with closure of chest wall on 4/8/19. [facility] Patient developed acute kidney injury post-CAB with creatinine peaking at 1.99 which improved after fluid resuscitation and pressor support. Patient was taken off pressor support. Patient was asked. On 4/10/19. Patient had altered mental status upon weaning from  sedatives . CT head showed scattered areas of encephalomalacia in bifrontal and left parietal areas consistent with remote infarct and possible subacute infarct in left lower parietal region. (24Hr): Intake/Output Summary (Last 24 hours) at 4/22/2019 1848  Last data filed at 4/22/2019 1838  Gross per 24 hour   Intake 1870 ml   Output 850 ml   Net 1020 ml       Labs:    Hematology:  Recent Labs     04/20/19  0452 04/20/19  1650 04/21/19  0502 04/22/19  0540   WBC 19.5*  --  18.8* 16.5*   HGB 9.4*  --  8.7* 8.9*   HCT 32.2*  --  29.1* 29.0*   * 509* 472* 530*   INR 1.3  --  1.4 1.3     Chemistry:  Recent Labs     04/20/19  0452 04/21/19  0502 04/22/19  0540   * 140 142   K 3.1* 3.9 3.0*   * 106 107   CO2 27 25 25   GLUCOSE 144* 110* 132*   BUN 24* 16 11   CREATININE 0.50* 0.44* 0.46*   MG 2.1 1.9 1.9   CALCIUM 8.4* 7.8* 7.9*     No results for input(s): PROT, LABALBU, LABA1C, H2UMLLE, T1TZWUO, FT4, TSH, AST, ALT, LDH, GGT, ALKPHOS, BILITOT, BILIDIR, AMMONIA, AMYLASE, LIPASE, LACTATE, CHOL, TRIG, HDL, LDLCALC, LDLDIRECT, LABVLDL, BNP, TROPONINI, CKTOTAL, CKMB, CKMBINDEX in the last 72 hours.     Lab Results   Component Value Date/Time    SPECIAL NOT REPORTED 04/22/2019 04:08 PM     Lab Results   Component Value Date/Time    CULTURE PENDING 04/22/2019 04:08 PM       Lab Results   Component Value Date    POCPH 7.451 04/19/2019    POCPCO2 44.1 04/19/2019    POCPO2 72.4 04/19/2019    POCHCO3 30.8 04/19/2019    NBEA NOT REPORTED 04/19/2019    PBEA 6 04/19/2019    ZLK9VYD 32 04/19/2019    TCAV2MAZ 95 04/19/2019    FIO2 40.0 04/19/2019       Radiology / Diagnostics:  See above      Assessment:        Primary Problem  Principal Problem:    Cardiac arrest [facility])  Active Problems:    Acute ST elevation myocardial infarction (STEMI) of anterior wall (HCC)    Severe left ventricular systolic dysfunction    Acute respiratory failure with hypoxemia (HCC)    Cardiogenic pulmonary edema (HCC)    PAD (peripheral artery disease) (HCC)    Subacute osteomyelitis of right tibia (HCC)    Exposed orthopaedic hardware ([address]    Metabolic encephalopathy    Abnormal CT of the head    Fever chills    Hepatitis Acute cerebral infarction associated with systemic hypoxia or ischemia    Multi infarct state    Carotid stenosis, bilateral    Hypokalemia    Hyperglycemia  Resolved Problems:    * No resolved hospital problems.  *      Plan         Optimize cardio-pulmonary function  Cardiology evaluation  Pulmonary evaluation  Cardiothoracic surgical evaluation  Antibiotics per C&S / Infectious Disease  Check bun and creatinine / renal evaluation  Neurology evaluation  Orthopedic evaluation - hardware removed right leg / tibial fracture  Wound care  Carotid angiogram and possible right ICA stent  Diet as tolerated  Seroquel  Anemia - blood products as needed  Correct electrolyte abnormalities - potassium supplementation    IP CONSULT TO CRITICAL CARE  IP CONSULT TO CARDIOTHORACIC SURGERY  IP CONSULT TO CARDIOLOGY  IP CONSULT TO CARDIAC REHAB  IP CONSULT TO CASE MANAGEMENT  IP CONSULT TO DIETITIAN  IP CONSULT TO SPIRITUAL SERVICES  IP CONSULT TO ORTHOPEDIC SURGERY  IP CONSULT TO INFECTIOUS DISEASES  IP CONSULT TO DIETITIAN  IP CONSULT TO IV TEAM  IP CONSULT TO NEUROLOGY  IP CONSULT TO NEPHROLOGY  IP CONSULT TO INTERNAL MEDICINE  IP CONSULT TO IV TEAM  IP CONSULT TO ENDOVASCULAR NEUROSURGERY  PALLIATIVE CARE EVAL  IP CONSULT TO IV TEAM  IP CONSULT TO PHYSICAL MEDICINE Leo Garner DO    4/22/2019    6:48 PM

## 2019-04-22 NOTE — PROGRESS NOTES
Nutrition Education    Type and Reason for Visit: Patient Education        · Verbally reviewed following information with patient  · Written educational materials provided. · Contact number provided. · Refer to Patient Education activity for more details.     Electronically signed by Leelee Bazan DTR on 4/22/19 at 9:55 AM

## 2019-04-22 NOTE — CARE COORDINATION
Transition planning PT rec PM&R discussed with Zaki Gaines son was reviewing snf list  17:48 son Alanna Leal visiting discussed ip rehab vs snf agreeable to [facility] if recommended and back up plan of snf 1st choice [place] [place]  If needed [facility]nd choice [facility]

## 2019-04-22 NOTE — PROGRESS NOTES
Restrictions  Sternal Precautions: Emergent CABG X4  Other position/activity restrictions: Fall Risk  Subjective   General  Patient assessed for rehabilitation services?: Yes  Family / Caregiver Present: No  Diagnosis: CABGx4, POD#14, RLE hardware infection  Pain Assessment  Response to Pain Intervention: Patient Satisfied  Vital Signs  Patient Currently in Pain: Denies   Orientation  A&OX2 (oriented to name, place as hospital, pt stated month was January 1992) needed vc's for name of place, time and situation. Objective    ADL  Feeding: Supervision;Setup; Increased time to complete(Pt is currently NPO for sx this afternoon 4/22/19)  Grooming: Stand by assistance; Moderate assistance;Setup;Verbal cueing; Increased time to complete(SBA to wash face and rinse mouth, Mod to wash/comb hair)    Additional Comments: RN in room upon arrival. Pt had a fall prior to OT arrival. Pt in bed and bathing with sx soap completed. Setup for grooming at tray table (see above). Pt A&O x2 (oriented to name, needed vc's for place, time & situation). Pt able to follow direction for grooming tasks. Transfers  Transfer Comments: RN did not want pt back up to chair at this time d/t recent fall and going for sx around noon to have hardware from knee removed. Pt needed vc's to complete tasks. Pt left in bed, call light and bed alarm activated at end of tx.       Plan   Plan  Times per week: 5x  Current Treatment Recommendations: Balance Training, Functional Mobility Training, Endurance Training, Equipment Evaluation, Education, & procurement, Home Management Training, Patient/Caregiver Education & Training, Self-Care / ADL, Safety Education & Training, Pain Management, Strengthening    Goals  Short term goals  Time Frame for Short term goals: Pt will by discharge   Short term goal 1: demo ADL UB/LB dressing/bathing activity seated with setup and min A/min vc's  Short term goal 2: demo RUE of 5/5 grossly for use in ADL completion  Short

## 2019-04-22 NOTE — PLAN OF CARE
Problem: OXYGENATION/RESPIRATORY FUNCTION  Goal: Patient will maintain patent airway  Outcome: Ongoing  Goal: Patient will achieve/maintain normal respiratory rate/effort  Description  Respiratory rate and effort will be within normal limits for the patient  Outcome: Ongoing     Problem: Nutrition  Goal: Optimal nutrition therapy  Outcome: Ongoing     Problem: Falls - Risk of:  Goal: Will remain free from falls  Description  Will remain free from falls  Outcome: Ongoing  Goal: Absence of physical injury  Description  Absence of physical injury  Outcome: Ongoing     Problem: Risk for Impaired Skin Integrity  Goal: Tissue integrity - skin and mucous membranes  Description  Structural intactness and normal physiological function of skin and  mucous membranes.   Outcome: Ongoing     Problem: SKIN INTEGRITY  Goal: Skin integrity is maintained or improved  Outcome: Ongoing     Problem: Cardiac Output - Decreased:  Goal: Cardiac output within specified parameters  Description  Cardiac output within specified parameters  Outcome: Ongoing  Goal: Hemodynamic stability will improve  Description  Hemodynamic stability will improve  Outcome: Ongoing     Problem: Fluid Volume - Imbalance:  Goal: Ability to achieve a balanced intake and output will improve  Description  Ability to achieve a balanced intake and output will improve  Outcome: Ongoing  Goal: Chest tube drainage is within specified parameters  Description  Chest tube drainage is within specified parameters  Outcome: Ongoing     Problem: HEMODYNAMIC STATUS  Goal: Patient has stable vital signs and fluid balance  Outcome: Ongoing     Problem: Pain:  Goal: Pain level will decrease  Description  Pain level will decrease  Outcome: Ongoing  Goal: Control of acute pain  Description  Control of acute pain  Outcome: Ongoing  Goal: Control of chronic pain  Description  Control of chronic pain  Outcome: Ongoing

## 2019-04-22 NOTE — ANESTHESIA PRE PROCEDURE
Department of Anesthesiology  Preprocedure Note       Name:  Tracey Orellana   Age:  46 y.o.  :  1965                                          MRN:  [id number]         Date:  2019      Surgeon: George Ford):  Tammy Olivares MD    Procedure: HARDWARE REMOVAL RIGHT TIBIA, SYNTHES, C-ARM, BROKEN SCREW SET, LARGE FRAG., 3.5 HEX  (N/A )    Medications prior to admission:   Prior to Admission medications    Not on File       Current medications:    Current Facility-Administered Medications   Medication Dose Route Frequency Provider Last Rate Last Dose    [MAR Hold] ceFAZolin (ANCEF) 2 g in dextrose 5 % 50 mL IVPB  2 g Intravenous On Call to [address] [place] [address],[address],         [MAR Hold] furosemide (LASIX) tablet 40 mg  40 mg Oral BID Kim Julien MD        [facility] Hold] metoprolol tartrate (LOPRESSOR) tablet 50 mg  50 mg Oral BID Kim Julien MD        [facility] Hold] potassium chloride (KLOR-CON M) extended release tablet 40 mEq  40 mEq Oral BID  Kim Julien MD        [facility] Hold] potassium chloride 10 mEq/100 mL IVPB (Peripheral Line)  10 mEq Intravenous PRN Karin Blancas  mL/hr at 19 0927 10 mEq at 19 0927    [MAR Hold] dextrose 5 % and 0.45 % sodium chloride infusion   Intravenous Continuous Vandana Da Silva MD 75 mL/hr at 19 0925      [MAR Hold] spironolactone (ALDACTONE) tablet 25 mg  25 mg Oral Daily Kim Julien MD   25 mg at 19 0840    [MAR Hold] potassium chloride 10 mEq/100 mL IVPB (Peripheral Line)  10 mEq Intravenous PRN Kim Julien  mL/hr at 19 1228 10 mEq at 19 1228    [MAR Hold] insulin lispro (HUMALOG) injection vial 0-12 Units  0-12 Units Subcutaneous TID  Vandana Da Silva MD   2 Units at 19 1436    [MAR Hold] insulin lispro (HUMALOG) injection vial 0-6 Units  0-6 Units Subcutaneous Nightly Houston Quiroz MD   3 Units at 19 0634    [MAR Hold] insulin glargine (LANTUS) injection vial 30 Units  30 Units Subcutaneous Daily Adireddy Ap Peace MD   30 Units at 04/21/19 0958    [MAR Hold] 0.45 % sodium chloride infusion   Intravenous Continuous Adireddy Ap Peace MD        [facility] Hold] lisinopril (PRINIVIL;ZESTRIL) tablet 5 mg  5 mg Oral Daily Karmen Coreas MD   5 mg at 04/22/19 0835    [MAR Hold] magnesium hydroxide (MILK OF MAGNESIA) 400 MG/5ML suspension 30 mL  30 mL Oral Daily PRN ISAAC Madrigal CNP        [MAR Hold] doxycycline hyclate (VIBRA-TABS) tablet 100 mg  100 mg Oral 2 times per day Chiki Rowe MD   100 mg at 04/22/19 0832    [MAR Hold] fentaNYL (SUBLIMAZE) injection 25 mcg  25 mcg Intravenous Q1H PRN Karmen Coreas MD   25 mcg at 04/19/19 2356    [MAR Hold] sodium chloride flush 0.9 % injection 10 mL  10 mL Intravenous PRN Zofia Olson MD   10 mL at 04/15/19 0820    [MAR Hold] labetalol (NORMODYNE;TRANDATE) injection 10 mg  10 mg Intravenous Q6H PRN Sukhwinder Harrison MD   5 mg at 04/17/19 0619    [MAR Hold] niCARdipine (CARDENE) 20 mg in 0.9 % sodium chloride 200 mL infusion  2.5 mg/hr Intravenous Continuous Adireddy Ap Peace MD        [facility] Hold] famotidine (PEPCID) tablet 20 mg  20 mg Oral BID Sid Davenport MD   20 mg at 04/22/19 0833    [MAR Hold] enoxaparin (LOVENOX) injection 40 mg  40 mg Subcutaneous Daily Sid Davenport MD   40 mg at 04/21/19 0954    [MAR Hold] nystatin (MYCOSTATIN) 580289 UNIT/ML suspension 500,000 Units  5 mL Oral 4x Daily ISAAC Lazar CNP   500,000 Units at 04/22/19 1206    [MAR Hold] aspirin chewable tablet 81 mg  81 mg Oral Daily CINDY Zayas   81 mg at 04/22/19 0830    [MAR Hold] docusate (COLACE) 50 MG/5ML liquid 100 mg  100 mg Oral Daily CINDY Zayas   100 mg at 04/22/19 0832    [MAR Hold] albuterol (PROVENTIL) nebulizer solution 2.5 mg  2.5 mg Nebulization Q6H PRN Nicole Baumann MD   2.5 mg at 04/10/19 8715    [MAR Hold] dextrose injection 12.5 g  12.5 g Intravenous Once ISAAC Lazar - CNP        [facility] Hold] Rectal Daily PRN Ne Barclay APRN - CNP        [MAR Hold] ondansetron [facility]) injection 4 mg  4 mg Intravenous Q8H PRN Ne Barclay APRN - CNP        [MAR Hold] chlorhexidine (PERIDEX) 0.12 % solution 15 mL  15 mL Mouth/Throat BID Ne Barclay APRN - CNP   15 mL at 04/22/19 0831    [MAR Hold] hydrALAZINE (APRESOLINE) injection 5 mg  5 mg Intravenous Q5 Min PRN Ne Barclay APRN - CNP   5 mg at 04/16/19 1026    [MAR Hold] therapeutic multivitamin-minerals 1 tablet  1 tablet Oral Daily with breakfast Ne Barclay APRN - CNP   1 tablet at 04/22/19 0841    [MAR Hold] atorvastatin (LIPITOR) tablet 40 mg  40 mg Oral Nightly Ne Barclay APRN - CNP   40 mg at 04/21/19 2012    [MAR Hold] clopidogrel (PLAVIX) tablet 75 mg  75 mg Oral Daily Ne Barclay APRN - CNP   75 mg at 04/22/19 0831    [MAR Hold] albumin human 5 % bottle 25 g  25 g Intravenous PRN Ne Barclay, APRN - CNP   25 g at 04/11/19 2130    [MAR Hold] glucose (GLUTOSE) 40 % oral gel 15 g  15 g Oral PRN Ne Barclay, APRN - CNP        [MAR Hold] dextrose 50 % solution 12.5 g  12.5 g Intravenous PRN Ne Barclay, APRN - CNP   12.5 g at 04/09/19 0136    [MAR Hold] glucagon (rDNA) injection 1 mg  1 mg Intramuscular PRN Ne Barclay, APRN - CNP        [MAR Hold] dextrose 5 % solution  100 mL/hr Intravenous PRN Reniyah Barclay, APRN - CNP           Allergies:  No Known Allergies    Problem List:    Patient Active Problem List   Diagnosis Code    [facility] I46.9    Acute ST elevation myocardial infarction (STEMI) of anterior wall ([facility]) I21.09    Severe left ventricular systolic dysfunction O26.7    Acute respiratory failure with hypoxemia ([facility]) J96.01    Cardiogenic pulmonary edema ([facility]) I50.1    PAD (peripheral artery disease) ([facility]) I73.9    Subacute osteomyelitis of right tibia ([facility] M86.261    Exposed orthopaedic hardware ([facility] U70.108Z    Metabolic encephalopathy K70.86    Abnormal CT of the head R93.0    Fever chills R50.9  Hepatitis K75.9    Acute cerebral infarction associated with systemic hypoxia or ischemia I63.89    Multi infarct state I69.30    Carotid stenosis, bilateral I65.23       Past Medical History:        Diagnosis Date    CAD (coronary artery disease) 04/07/2019    [facility]    Osteolysis 04/07/2019    R Knee hardware    PEA (Pulseless electrical activity) ([facility] 04/07/2019    PVD (peripheral vascular disease) ([facility] 04/07/2019       Past Surgical History:        Procedure Laterality Date    CARDIAC CATHETERIZATION  04/07/2019    MVD-Stv    CORONARY ARTERY BYPASS GRAFT  04/07/2019    x4 per Dr. Georgie Ferguson, [address] GRAFT N/A 4/7/2019    EMERGENT CPR,  CORONARY ARTERY BYPASS X4, ON PUMP ,CHEST LEFT OPEN; SWAN, ANGELA PER ANESTHESIA performed by Alpa Abreu MD at [address] [place] [address],[address]      unsure if correct known tibial surgery    HARDWARE REMOVAL Right 04/07/2019    R Knee internal screw protruding through skin    RECONSTRUCTIVE REPAIR STERNAL N/A 4/8/2019    STERNAL  WOUND WASHOUT AND CLOSURE S/P CABG performed by Alpa Abreu MD at [address] [place] [address] Right     [facility] Head off roof, external fixation and internal hardware    TONSILLECTOMY         Social History:    Social History     Tobacco Use    Smoking status: Unknown If Ever Smoked   Substance Use Topics    Alcohol use: Not on file                                Counseling given: Not Answered      Vital Signs (Current):   Vitals:    04/22/19 0800 04/22/19 0815 04/22/19 1145 04/22/19 1422   BP:  (!) 149/70 137/69 (!) 147/72   Pulse: 100 104 86 92   Resp:  17 17 16   Temp:  98.1 °F (36.7 °C) 98.2 °F (36.8 °C) 98.2 °F (36.8 °C)   TempSrc:  Oral Oral Temporal   SpO2:  92% 96% 97%   Weight:       Height:                                                  BP Readings from Last 3 Encounters:   04/22/19 (!) 147/72   04/07/19 (!) 39/22       NPO Status: Time of last liquid consumption: 2359                        Time of last solid consumption: 0830                        Date of last liquid consumption: 04/21/19                        Date of last solid food consumption: 04/22/19    BMI:   Wt Readings from Last 3 Encounters:   04/22/19 161 lb 2.5 oz (73.1 kg)     Body mass index is 24.5 kg/m². CBC:   Lab Results   Component Value Date    WBC 16.5 04/22/2019    RBC 3.01 04/22/2019    HGB 8.9 04/22/2019    HCT 29.0 04/22/2019    MCV 96.3 04/22/2019    RDW 13.5 04/22/2019     04/22/2019       CMP:   Lab Results   Component Value Date     04/22/2019    K 3.0 04/22/2019     04/22/2019    CO2 25 04/22/2019    BUN 11 04/22/2019    CREATININE 0.46 04/22/2019    GFRAA >60 04/22/2019    LABGLOM >60 04/22/2019    GLUCOSE 132 04/22/2019    PROT 6.3 04/19/2019    CALCIUM 7.9 04/22/2019    BILITOT 1.01 04/19/2019    ALKPHOS 206 04/19/2019     04/19/2019     04/19/2019       POC Tests:   Recent Labs     04/22/19  1448   POCGLU 117*       Coags:   Lab Results   Component Value Date    PROTIME 13.1 04/22/2019    INR 1.3 04/22/2019    APTT 25.7 04/07/2019       HCG (If Applicable): No results found for: PREGTESTUR, PREGSERUM, HCG, HCGQUANT     ABGs: No results found for: PHART, PO2ART, WOZ0UHE, UXV6SGI, BEART, T9XGDEPE     Type & Screen (If Applicable):  No results found for: LABABO, LABRH    Anesthesia Evaluation   no history of anesthetic complications:   Airway: Mallampati: II     Neck ROM: full  Mouth opening: > = 3 FB Dental:          Pulmonary:       (-) asthma, shortness of breath, sleep apnea and stridor                           Cardiovascular:    (+) past MI: < 1 month, CAD:, CABG/stent:, CHF:, hyperlipidemia    (-) pacemaker and  angina        Rate: normal                    Neuro/Psych:   (+) CVA:,    (-) seizures           GI/Hepatic/Renal:   (+) liver disease:,      (-) GERD      ROS comment: Soft diet after swallow study.    Endo/Other:    (+) blood dyscrasia::., .    (-) diabetes mellitus, hypothyroidism, hyperthyroidism               Abdominal:       Abdomen: soft. Vascular:   + PVD, aortic or cerebral, . Anesthesia Plan      general     ASA 4       Induction: intravenous. Anesthetic plan and risks discussed with patient. Echo 4/12/19:  Left ventricle is normal in size. Global left ventricular systolic function is mildly reduced. Calculated ejection fraction is 40. Left ventricular wall thickness is mildly increased. Grade II diastolic dysfunction  Normal valvular function        Assessment: 1. PEA Arrest (Multiple times) with ROSC in few minutes - S/P extubation( 4/19)  2. Late presentation of anteroseptal STEMI   3. Multivessel CAD s/p emergent CABG on 4/7 with LIMA-LAD, SVG-PDA,OM 1 & distal circumflex). 4. S/P sternal closure on 4/8  5. Ischemic cardiomyopathy with acute systolic CHF (EF 98% on ANGELA and TTE)  6. NSVT  7. Post op anemia stable  8. Post op thrombocytopenia resolved  9. Acute pulmonary edema s/p extubation( 4/19)  10. Sepsis resolved  11. Acute renal failure - resolved  12. Metabolic encephalopathy resolving  13. Exposed orthopedic hardware S/P removal of Protruding orthopedic nail from Rt lateral knee   14. S/P ORIF Rt proximal tibia  15. Shock Liver with AST and ALT in 3000s (AST 3077, ALT 3818) resolved  16. Gallbladder sludge with concern for cholecystitis  17. Multiple embolic acute infarcts in brain( 4/14)  18. Left internal carotid artery occlusion  19. ICU Delirium  20 Dysphagia with dental soft diet     Treatment Plan:   1 S/P extubation( 4/19). Po lasix 40 BID. lopressor dose increased to 50 BID. Lisinopril 5 mg. Aldactone 25 mg for GDMT. Hold the anti HTN if SBP<110 as pt had recent CVA.  Increased the dose of potassium to 40 BID     2 continue with rest of medications for the patient including asa, plavix and lipitor.     3 Seroquel HS for the ICU delirium       Electronically signed on 04/22/19 at 1:11 PM by:  Diana Valenzuela MD   Fellow, [address]                                                  Lauren Hoover MD   4/22/2019

## 2019-04-22 NOTE — PROGRESS NOTES
40.  Left ventricular wall thickness is mildly increased. Grade II diastolic dysfunction  Normal valvular function      Assessment:   1. PEA Arrest (Multiple times) with ROSC in few minutes - S/P extubation( 4/19)  2. Late presentation of anteroseptal STEMI   3. Multivessel CAD s/p emergent CABG on 4/7 with LIMA-LAD, SVG-PDA,OM 1 & distal circumflex). 4. S/P sternal closure on 4/8  5. Ischemic cardiomyopathy with acute systolic CHF (EF 26% on ANGELA and TTE)  6. NSVT  7. Post op anemia stable  8. Post op thrombocytopenia resolved  9. Acute pulmonary edema s/p extubation( 4/19)  10. Sepsis resolved  11. Acute renal failure - resolved  12. Metabolic encephalopathy resolving  13. Exposed orthopedic hardware S/P removal of Protruding orthopedic nail from Rt lateral knee   14. S/P ORIF Rt proximal tibia  15. Shock Liver with AST and ALT in 3000s (AST 3077, ALT 3818) resolved  16. Gallbladder sludge with concern for cholecystitis  17. Multiple embolic acute infarcts in brain( 4/14)  18. Left internal carotid artery occlusion  19. ICU Delirium  20 Dysphagia with dental soft diet    Treatment Plan:   1 S/P extubation( 4/19). Po lasix 40 BID. lopressor dose increased to 50 BID. Lisinopril 5 mg. Aldactone 25 mg for GDMT. Hold the anti HTN if SBP<110 as pt had recent CVA. Increased the dose of potassium to 40 BID    2 continue with rest of medications for the patient including asa, plavix and lipitor. 3 Seroquel HS for the ICU delirium     4 Plan for infected right tibial hardware removal      Electronically signed on 04/22/19 at 1:11 PM by:    Renuka Doe MD   Fellow, [address]              Attending Cardiologist Addendum: I have reviewed and performed the history, physical, subjective, objective, assessment, and plan with the resident/fellow and agree with the note. I performed the history and physical personally. I have made changes to the note above as needed.     Thank

## 2019-04-22 NOTE — PROGRESS NOTES
Progress Note    Patient:  Ralph Duncan  YOB: 1965     46 y.o. male    Subjective:  Patient seen and examined. No acute issues overnight. No complaints or concerns. Pain is controlled. Patient denies F, HA, CP, SOB, N/V. Admits to numbness and tingling to the right leg and foot. Afebrile, VSS     Objective:   Vitals:    04/22/19 0342   BP: (!) 151/84   Pulse:    Resp:    Temp:    SpO2:      Gen: NAD, cooperative  Cardiovascular: Regular rate, no dependent edema, distal pulses 2+  Respiratory: Chest symmetric, no accessory muscle use, normal respirations  RLE: Small wound over anterior and lateral aspect of proximal tibia draining purulent drainage with small amount of surrounding erythema. Diffuse weakness. Compartments soft. 2+ DP pulse. 0/5 TA/EHL/FHL/GS. Diffuse dysesthesias in the Deep and Superficial Peroneal/Saphenous/Sural nerve distributions        Recent Labs     04/21/19  0502   WBC 18.8*   HGB 8.7*   HCT 29.1*   *   INR 1.4      K 3.9   BUN 16   CREATININE 0.44*   GLUCOSE 110*        Meds: ASA, Plavix, Lovenox  See rec for complete list    Impression/plan: 46 y.o. male R tibia hardware infection, s/p bedside screw removal on 4/7    - Patient will need remaining hardware removed. - Will discuss with Dr. Nunu Borrero regarding surgical timing.  - NPO now until surgical timing is determined. - WBAT  - Patient will need AFO/PRAFO for RLE foot drop. - Pain control and medical management per primary  - DVT ppx: EPC, Lovenox, Plavix.   Management per primary  - Ice (20 minutes on and off 1 hour) and elevate (above heart) as needed for swelling/pain  - PT/OT  - Please page DO ortho with any questions    Quyen Hudson, DO  PGY-2, Department of [address], [facility], [address] [facility] [address] [facility] [address]  4:52 AM 4/22/2019

## 2019-04-22 NOTE — PROGRESS NOTES
Speech Language Pathology  Speech Language Pathology  [facility]    Cognitive Treatment Note    Date: 4/22/2019  Patients Name: Juanito Garcia  MRN: [id number]  Diagnosis:   Patient Active Problem List   Diagnosis Code    Cardiac arrest [facility]) I46.9    Acute ST elevation myocardial infarction (STEMI) of anterior wall ([facility]) I21.09    Severe left ventricular systolic dysfunction C17.8    Acute respiratory failure with hypoxemia ([facility]) J96.01    Cardiogenic pulmonary edema ([facility]) I50.1    PAD (peripheral artery disease) ([facility]) I73.9    Subacute osteomyelitis of right tibia ([facility] M86.261    Exposed orthopaedic hardware ([facility] L05.411P    Metabolic encephalopathy H20.83    Abnormal CT of the head R93.0    Fever chills R50.9    Hepatitis K75.9    Acute cerebral infarction associated with systemic hypoxia or ischemia I63.89    Multi infarct state I69.30    Carotid stenosis, bilateral I65.23       Pain: 0/10    Cognitive Treatment    Treatment time: 10:28-10:45      Subjective: [x] Alert [] Cooperative     [x] Confused     [] Agitated    [] Lethargic      Objective/Assessment:    Attention: Pt appeared distracted and confused throughout session, often looking around the room and losing his train of thought during a task. Pt required mod verbal cues and repetitions to remain attentive during tx. Recall: Delayed: 1/3 increased to 3/3 with mod verbal cues. Organization: Fruits: +3 increased to +5 with min verbal cues. Things you drink: +4 increased to +5    Problem Solving/Reasoning: Stating Situational Problems: 5/10 increased to 7/10 with mod verbal cues. Other: Per RN report, pt had been found on the floor of his room earlier this morning. Further therapy recommended at discharge. The patient should be able to tolerate at least three hours of therapy per day over 5 days or 15 hours over 7 days.      Plan:  [x] Continue ST services    [] Discharge from : Discharge recommendations: [] Inpatient Rehab   [] [place]   [] Outpatient Therapy  [] Follow up at trauma clinic   [x] Other: therapy      Completed by Jena Dave,  Clinician  Co-signed by Saul Stiles A.CCC/SLP

## 2019-04-22 NOTE — PROGRESS NOTES
Physical Therapy  Facility/Department: [facility] 1  Daily Treatment Note  NAME: Colleen Dorantes  : 1965  MRN: [id number]    Date of Service: 2019    Discharge Recommendations: Further therapy recommended at discharge. Patient Diagnosis(es): The primary encounter diagnosis was Acute ST elevation myocardial infarction (STEMI) of anterior wall ([facility] A diagnosis of Cardiac arrest [facility]) was also pertinent to this visit. has a past medical history of CAD (coronary artery disease), Cardiac arrest ([facility] Osteolysis, PEA (Pulseless electrical activity) ([facility] and PVD (peripheral vascular disease) [facility] has a past surgical history that includes Cardiac catheterization (2019); Femur Surgery; Tonsillectomy; Coronary artery bypass graft (2019); Hardware Removal (Right, 2019); Tibia fracture surgery (Right); Coronary artery bypass graft (N/A, 2019); and RECONSTRUCTIVE REPAIR STERNAL (N/A, 2019). Restrictions  Restrictions/Precautions  Restrictions/Precautions: General Precautions, Fall Risk, Surgical Protocols  Required Braces or Orthoses?: No  Position Activity Restriction  Sternal Precautions: No Pushing, No Pulling, 5# Lifting Restrictions  Other position/activity restrictions: Amb pt, up with A  Subjective    Pt sitting up in his chair. Pt's chair is alarmed and has a Posey vest on. Pt is scheduled for Hardware removal in his R knee this afternoon. Pt needs an AFO for his R foot. Pt has no c/o pain. Orientation    Overall Orientation Status: Within Functional Limits    Objective    Transfers  Sit to Stand: Moderate Assistance  Stand to sit: Moderate Assistance  Comment: Pt has a retro lean with standing. Ambulation  Ambulation?: Yes  Ambulation 1  Surface: level tile  Device: PF Rolling Walker  Assistance: Moderate assistance  Quality of Gait: WBAT Right LE, Flexed posture,AFO on R foot,step too gait with L foot. Pt demonstrates weak Dorsiflexion on L foot.   Distance: 48'

## 2019-04-22 NOTE — PROGRESS NOTES
[facility] - Progress Note    Today's Date and Time: 4/22/2019, 4:53 PM    Impression :   1. CAD with cardiac arrest x 3. (on the field, in ER and in OR)  2. S/P emergent CABG x 4 on 4-7-19.   3. Open chest post CABG. 4. S/p delayed primary closure of chest incision on 4-8-19  5. Improvement in Poor cardiac function with LVEF 10%-->45%  6. Exposed orthopedic hardware Rt leg  7. S/P removal of Protruding orthopedic nail from Rt lateral knee area. Old blood and malodor. 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 8. S/P ORIF Rt proximal tibia. Non compliant with follow up. 9. Fevers  10. Pulmonary edema  11. ALMITA resolved  12. S/P removal of infected leg hardware. 4-22-19    Recommendations:     · Discontinued ceftaroline. · Started on doxycycline for chronic leg osteomyelitis  · Patient had removal of infected hardware 4-22-19. Will await culture data      Medical Decision Making/Summary/Discussion:   · Patient admitted with chest pain and cardiac arrests x 3  · Emergent CABG x 4. S/P sternum closure on 4-8-19  · Found to have protruding Rt tibial orthopedic screw from prior ORIF of tibial fracture  · No overt infection of the leg  · Temp 103 on 4-11-19. Repeat blood cultures: No growth . Sputum with mixed oral tabitha. Vancomycin started. · ALMITA with Creatinine 0.4-->1. 19. Vancomycin d/c. Ceftaroline started. · ALMITA resolved  · HIDA scan ordered for elevated LFTs: Normal on 4-15-19  · MRI with occlusion Lt carotid artery and multiple infracts  · Extubated 4-19-19. More responsive  · Has 2 peripheral IVs, will D/C central line (12 days in)  · Rt groin puncture site had some drainage. Wound cleansed on 4-19-19  · Confusion at night 4-20.  Tried to remove tubes  · Removal of infected leg hardware 4-22-19  Infection Control Recommendations   · [place] Precautions  · Contact Isolation     Antimicrobial Stewardship Recommendations     · Discontinuation of therapy    Coordination of Outpatient Care:   · Estimated Length of IV antimicrobials: D/C 4-11-19  · Patient will need Midline Catheter Insertion:   · Patient will need PICC line Insertion:  · Patient will need: Home IV , [place],  SNF,  LTAC: TBD  · Patient will need outpatient wound care:TBD    Chief complaint/reason for consultation:   · Emergent CABG  · Protruding orthopedic nail from Rt knee      History of Present Illness:   Todd Servin is a 46y.o.-year-old  male who was initially admitted on 4/7/2019. Patient seen at the request of Diana Gentile. INITIAL HISTORY:     Patient apparently developed chest pain and called EMS. Had cardiac arrest at the scene, was resuscitated and brought to ER where he had second cardiac arrest. Was resuscitated once again and taken to OR for emergent CABG x 4. He had another cardiac arrest in OR. Has survived the above and is back in the ICU. Found to have area of malodor on lateral aspect Rt knee, corresponding to a protruding orthopedic screw from a remote ORIF of the Rt  Tibial plateau. The screw has been removed and sent for culture. X rays show healed fracture of the proximal tibia. There is additional hardware in place that will likely need to be removed if patient survives. CURRENT EVALUATION : 4/22/2019    Patient seen and examined this morning. Stable    Will have leg hardware removal later today    Remains somewhat confused   Clinically showing overall improvement. Labs reviewed: 4/22/2019    CT of head 4-13-19 showed scattered infarcts of different ages. MRI follow up 4/14 showed multiple new embolic strokes. Absent flow Lt carotid artery. Ultrasound 4-15-19 with occlusion of Lt internal carotid artery. CXR 4-15-19: Bilateral effusions, parahilar and lower zone airspace opacities. Retrocardiac consolidation. Changes are those of CHF.      Na 149 -> 150 -> 140  K 3.5 -> 3.9  WBCs 17.5 -> 25.1 -> 30 0> 20.1 -> 18.8  Hgb 9.1. -->8.7 -> 9.4 -->8.7    ALMITA has resolved with creatinine of 0.44 today. Echocardiogram showed mildly reduced ejection fraction. No clot visualized. Neuroendovascular surgery is planning on cerebral angiogram with possible stenting next week. Cultures:  Wound:  · Orthopedic screw: Leg screw cultures found. Direct exam showed many gram + cocci in clusters and few gram - rods, NGTD. Lab reports quality of sample is questioned and to interpret results carefully. Blood:  · Blood cultures 4/11 x 2 with no growth to date. Urine:  · Urine culture 4/12 with no growth to date. Sputum  · Sputum culture 4/12 with mixed bacterial morphotypes. Discussed with RN, family. I have personally reviewed the past medical history, past surgical history, medications, social history, and family history, and I have updated the database accordingly.   Past Medical History:     Past Medical History:   Diagnosis Date    CAD (coronary artery disease) 04/07/2019    Cardiac arrest ([facility] 04/07/2019    Osteolysis 04/07/2019    R Knee hardware    PEA (Pulseless electrical activity) ([facility] 04/07/2019    PVD (peripheral vascular disease) ([facility] 04/07/2019       Past Surgical  History:     Past Surgical History:   Procedure Laterality Date    CARDIAC CATHETERIZATION  04/07/2019    MVD-Stv    CORONARY ARTERY BYPASS GRAFT  04/07/2019    x4 per Dr. Ulysses Gandy, [address] [place] [address] GRAFT N/A 4/7/2019    EMERGENT CPR,  CORONARY ARTERY BYPASS X4, ON PUMP ,CHEST LEFT OPEN; SWAN, ANGELA PER ANESTHESIA performed by Ford Bean MD at [address] [facility]      unsure if correct known tibial surgery    HARDWARE REMOVAL Right 04/07/2019    R Knee internal screw protruding through skin    HARDWARE REMOVAL Right 04/22/2019    HARDWARE REMOVAL RIGHT TIBIA,    RECONSTRUCTIVE REPAIR STERNAL N/A 4/8/2019    STERNAL  WOUND WASHOUT AND CLOSURE S/P CABG performed by Ford Bean MD at [address] [place] [address] FRACTURE SURGERY Right     [place] off roof, external fixation and internal hardware    TONSILLECTOMY         Medications:      [MAR Hold] furosemide  40 mg Oral BID    [MAR Hold] metoprolol tartrate  50 mg Oral BID    [MAR Hold] potassium chloride  40 mEq Oral BID WC    [MAR Hold] spironolactone  25 mg Oral Daily    [MAR Hold] insulin lispro  0-12 Units Subcutaneous TID WC    [MAR Hold] insulin lispro  0-6 Units Subcutaneous Nightly    [MAR Hold] insulin glargine  30 Units Subcutaneous Daily    [MAR Hold] lisinopril  5 mg Oral Daily    [MAR Hold] doxycycline hyclate  100 mg Oral 2 times per day    [MAR Hold] famotidine  20 mg Oral BID    [MAR Hold] enoxaparin  40 mg Subcutaneous Daily    [MAR Hold] nystatin  5 mL Oral 4x Daily    [MAR Hold] aspirin  81 mg Oral Daily    [MAR Hold] docusate  100 mg Oral Daily    [MAR Hold] dextrose  12.5 g Intravenous Once    [MAR Hold] albuterol  2.5 mg Nebulization Q6H    [MAR Hold] sodium chloride flush  10 mL Intravenous 2 times per day    [MAR Hold] sodium chloride flush  10 mL Intravenous 2 times per day    [MAR Hold] polyethylene glycol  17 g Oral Daily    [MAR Hold] chlorhexidine  15 mL Mouth/Throat BID    [MAR Hold] therapeutic multivitamin-minerals  1 tablet Oral Daily with breakfast    [MAR Hold] atorvastatin  40 mg Oral Nightly    [MAR Hold] clopidogrel  75 mg Oral Daily       Social History:     Social History     Socioeconomic History    Marital status: Unknown     Spouse name: Not on file    Number of children: Not on file    Years of education: Not on file    Highest education level: Not on file   Occupational History    Not on file   Social Needs    Financial resource strain: Not on file    Food insecurity:     Worry: Not on file     Inability: Not on file    Transportation needs:     Medical: Not on file     Non-medical: Not on file   Tobacco Use    Smoking status: Former Smoker   Substance and Sexual Activity    Alcohol use: Not on file    Drug use: Not on file    Sexual activity: Not on file   Lifestyle    Physical activity:     Days per week: Not on file     Minutes per session: Not on file    Stress: Not on file   Relationships    Social connections:     Talks on phone: Not on file     Gets together: Not on file     Attends [employer] service: Not on file     Active member of club or organization: Not on file     Attends meetings of clubs or organizations: Not on file     Relationship status: Not on file    Intimate partner violence:     Fear of current or ex partner: Not on file     Emotionally abused: Not on file     Physically abused: Not on file     Forced sexual activity: Not on file   Other Topics Concern    Not on file   Social History Narrative    Not on file       Family History:   History reviewed. No pertinent family history. Allergies:   Patient has no known allergies. Review of Systems:   Unable to provide. Sedated on ventilator. Physical Examination :     Patient Vitals for the past 8 hrs:   BP Temp Temp src Pulse Resp SpO2   04/22/19 1645 (!) 111/58 -- -- -- -- 93 %   04/22/19 1621 120/75 97.3 °F (36.3 °C) Temporal 85 23 --   04/22/19 1422 (!) 147/72 98.2 °F (36.8 °C) Temporal 92 16 97 %   04/22/19 1145 137/69 98.2 °F (36.8 °C) Oral 86 17 96 %     General Appearance: Extubated  Head:  Normocephalic, no trauma  Eyes: Pupils equal, round, reactive to light; sclera anicteric; conjunctivae pink. No embolic phenomena. ENT: Oropharynx clear, without erythema, exudate, or thrush. No tenderness of sinuses. Mouth/throat: mucosa pink and moist. No lesions. Dentition in good repair. Neck:Supple, without lymphadenopathy. Thyroid normal, No bruits. Pulmonary/Chest: Coarse sounds  Cardiovascular: Regular rate and rhythm without murmurs, rubs, or gallops. Abdomen: Soft, non tender. Bowel sounds normal. No organomegaly  All four Extremities: No cyanosis, clubbing, edema, or effusions.   Neurologic: Intermittently awake, responds appropriately at times. Skin: Warm and dry with good turgor. Signs of peripheral arterial insufficiency. Rt lateral knee with open incision. Chest incision closed. Medical Decision Making -Laboratory:   I have independently reviewed/ordered the following labs:    CBC with Differential:   Recent Labs     04/21/19  0502 04/22/19  0540   WBC 18.8* 16.5*   HGB 8.7* 8.9*   HCT 29.1* 29.0*   * 530*     BMP:   Recent Labs     04/21/19  0502 04/22/19  0540    142   K 3.9 3.0*    107   CO2 25 25   BUN 16 11   CREATININE 0.44* 0.46*   MG 1.9 1.9     Hepatic Function Panel:   No results for input(s): PROT, LABALBU, BILIDIR, IBILI, BILITOT, ALKPHOS, ALT, AST in the last 72 hours. No results for input(s): RPR in the last 72 hours. No results for input(s): HIV in the last 72 hours. No results for input(s): BC in the last 72 hours. Lab Results   Component Value Date    MUCUS NOT REPORTED 04/14/2019    RBC 3.01 04/22/2019    TRICHOMONAS NOT REPORTED 04/14/2019    WBC 16.5 04/22/2019    YEAST NOT REPORTED 04/14/2019    TURBIDITY CLOUDY 04/14/2019     Lab Results   Component Value Date    CREATININE 0.46 04/22/2019    GLUCOSE 132 04/22/2019       Medical Decision Making-Imaging:     EXAMINATION:   3 XRAY VIEWS OF THE RIGHT KNEE       4/7/2019 5:00 pm       COMPARISON:   04/07/2019       HISTORY:   ORDERING SYSTEM PROVIDED HISTORY: s/p Hardware removal at bedside   TECHNOLOGIST PROVIDED HISTORY:   s/p Hardware removal at bedside       FINDINGS:   Interval removal of anterior superior fixating screw.  No periprosthetic   fracture.  Chronic deformity of the proximal fibula.  Degenerative changes of   knee and tib-fib syndesmosis.            Impression   Removal of anterior-superior fixating screw without interval change otherwise     EXAMINATION:   3 XRAY VIEWS OF THE RIGHT KNEE       4/7/2019 3:28 pm       COMPARISON:   None.       HISTORY:   ORDERING SYSTEM PROVIDED HISTORY: Trauma/Fracture TECHNOLOGIST PROVIDED HISTORY:   Trauma/Fracture       FINDINGS:   Sideplate and 4 screws transfix the proximal tibia laterally.  Old healed   fracture proximal fibular neck and head.  Spurring lateral tibia plateau. Proximal tibiofibular syndesmosis.  Visualized femur and patella normal.   Soft tissues unremarkable.           Impression   ORIF proximal tibia.  No acute fracture.         EXAMINATION:   SINGLE XRAY VIEW OF THE CHEST       4/10/2019 6:47 am       COMPARISON:   Chest radiograph performed 04/09/2019.       HISTORY:   ORDERING SYSTEM PROVIDED HISTORY: post op   TECHNOLOGIST PROVIDED HISTORY:   post op       FINDINGS:   There is redemonstration of bilateral pulmonary congestion/edema that is   similar compared to prior.  There are bibasilar effusions. Wayna Aiden is no   pneumothorax.  The heart is prominent.  The upper abdomen is unremarkable. The extrathoracic soft tissues are unremarkable. Wayna Aiden is an endotracheal   tube with the tip in the midtrachea.  There is a gastric tube with the tip in   the proximal aspect of the stomach.  The right internal jugular line has been   removed.  The [place] catheter has been removed.           Impression   Cardiomegaly and bilateral pulmonary congestion/edema that is similar   compared to prior.       Bibasilar effusions and stable endotracheal tube and gastric tube.             Medical Decision Making-Other: Thank you for allowing us to participate in the care of this patient. Please call with questions.     Franny Salazar MD

## 2019-04-23 LAB
ANION GAP SERPL CALCULATED.3IONS-SCNC: 9 MMOL/L (ref 9–17)
BUN BLDV-MCNC: 10 MG/DL (ref 6–20)
BUN/CREAT BLD: ABNORMAL (ref 9–20)
CALCIUM SERPL-MCNC: 7.8 MG/DL (ref 8.6–10.4)
CHLORIDE BLD-SCNC: 101 MMOL/L (ref 98–107)
CO2: 25 MMOL/L (ref 20–31)
CREAT SERPL-MCNC: 0.45 MG/DL (ref 0.7–1.2)
GFR AFRICAN AMERICAN: >60 ML/MIN
GFR NON-AFRICAN AMERICAN: >60 ML/MIN
GFR SERPL CREATININE-BSD FRML MDRD: ABNORMAL ML/MIN/{1.73_M2}
GFR SERPL CREATININE-BSD FRML MDRD: ABNORMAL ML/MIN/{1.73_M2}
GLUCOSE BLD-MCNC: 105 MG/DL (ref 75–110)
GLUCOSE BLD-MCNC: 118 MG/DL (ref 75–110)
GLUCOSE BLD-MCNC: 136 MG/DL (ref 70–99)
GLUCOSE BLD-MCNC: 168 MG/DL (ref 75–110)
HCT VFR BLD CALC: 28.8 % (ref 40.7–50.3)
HEMOGLOBIN: 8.4 G/DL (ref 13–17)
INR BLD: 1.3
MAGNESIUM: 2 MG/DL (ref 1.6–2.6)
MCH RBC QN AUTO: 29.4 PG (ref 25.2–33.5)
MCHC RBC AUTO-ENTMCNC: 29.2 G/DL (ref 28.4–34.8)
MCV RBC AUTO: 100.7 FL (ref 82.6–102.9)
NRBC AUTOMATED: 0 PER 100 WBC
PDW BLD-RTO: 13.6 % (ref 11.8–14.4)
PLATELET # BLD: 539 K/UL (ref 138–453)
PMV BLD AUTO: 11.5 FL (ref 8.1–13.5)
POTASSIUM SERPL-SCNC: 3.4 MMOL/L (ref 3.7–5.3)
POTASSIUM SERPL-SCNC: 4.1 MMOL/L (ref 3.7–5.3)
PROTHROMBIN TIME: 13.3 SEC (ref 9–12)
RBC # BLD: 2.86 M/UL (ref 4.21–5.77)
SODIUM BLD-SCNC: 135 MMOL/L (ref 135–144)
WBC # BLD: 17.7 K/UL (ref 3.5–11.3)

## 2019-04-23 PROCEDURE — 6370000000 HC RX 637 (ALT 250 FOR IP): Performed by: STUDENT IN AN ORGANIZED HEALTH CARE EDUCATION/TRAINING PROGRAM

## 2019-04-23 PROCEDURE — 97530 THERAPEUTIC ACTIVITIES: CPT

## 2019-04-23 PROCEDURE — 36415 COLL VENOUS BLD VENIPUNCTURE: CPT

## 2019-04-23 PROCEDURE — APPSS15 APP SPLIT SHARED TIME 0-15 MINUTES: Performed by: NURSE PRACTITIONER

## 2019-04-23 PROCEDURE — 84132 ASSAY OF SERUM POTASSIUM: CPT

## 2019-04-23 PROCEDURE — 94762 N-INVAS EAR/PLS OXIMTRY CONT: CPT

## 2019-04-23 PROCEDURE — 2000000000 HC ICU R&B

## 2019-04-23 PROCEDURE — 99233 SBSQ HOSP IP/OBS HIGH 50: CPT | Performed by: INTERNAL MEDICINE

## 2019-04-23 PROCEDURE — 85610 PROTHROMBIN TIME: CPT

## 2019-04-23 PROCEDURE — 99253 IP/OBS CNSLTJ NEW/EST LOW 45: CPT | Performed by: PHYSICAL MEDICINE & REHABILITATION

## 2019-04-23 PROCEDURE — 6360000002 HC RX W HCPCS: Performed by: STUDENT IN AN ORGANIZED HEALTH CARE EDUCATION/TRAINING PROGRAM

## 2019-04-23 PROCEDURE — 94640 AIRWAY INHALATION TREATMENT: CPT

## 2019-04-23 PROCEDURE — 97116 GAIT TRAINING THERAPY: CPT

## 2019-04-23 PROCEDURE — 97535 SELF CARE MNGMENT TRAINING: CPT

## 2019-04-23 PROCEDURE — 2580000003 HC RX 258: Performed by: STUDENT IN AN ORGANIZED HEALTH CARE EDUCATION/TRAINING PROGRAM

## 2019-04-23 PROCEDURE — 85027 COMPLETE CBC AUTOMATED: CPT

## 2019-04-23 PROCEDURE — 99232 SBSQ HOSP IP/OBS MODERATE 35: CPT | Performed by: INTERNAL MEDICINE

## 2019-04-23 PROCEDURE — 2700000000 HC OXYGEN THERAPY PER DAY

## 2019-04-23 PROCEDURE — 82947 ASSAY GLUCOSE BLOOD QUANT: CPT

## 2019-04-23 PROCEDURE — 97127 HC SP THER IVNTJ W/FOCUS COG FUNCJ: CPT

## 2019-04-23 PROCEDURE — 80048 BASIC METABOLIC PNL TOTAL CA: CPT

## 2019-04-23 PROCEDURE — 6370000000 HC RX 637 (ALT 250 FOR IP): Performed by: THORACIC SURGERY (CARDIOTHORACIC VASCULAR SURGERY)

## 2019-04-23 PROCEDURE — 83735 ASSAY OF MAGNESIUM: CPT

## 2019-04-23 PROCEDURE — 97110 THERAPEUTIC EXERCISES: CPT

## 2019-04-23 PROCEDURE — 99024 POSTOP FOLLOW-UP VISIT: CPT | Performed by: NURSE PRACTITIONER

## 2019-04-23 RX ADMIN — INSULIN LISPRO 1 UNITS: 100 INJECTION, SOLUTION INTRAVENOUS; SUBCUTANEOUS at 08:55

## 2019-04-23 RX ADMIN — Medication 500000 UNITS: at 12:18

## 2019-04-23 RX ADMIN — ENOXAPARIN SODIUM 40 MG: 40 INJECTION SUBCUTANEOUS at 08:56

## 2019-04-23 RX ADMIN — FAMOTIDINE 20 MG: 20 TABLET, FILM COATED ORAL at 21:12

## 2019-04-23 RX ADMIN — SPIRONOLACTONE 25 MG: 25 TABLET ORAL at 08:55

## 2019-04-23 RX ADMIN — MULTIPLE VITAMINS W/ MINERALS TAB 1 TABLET: TAB at 08:56

## 2019-04-23 RX ADMIN — FAMOTIDINE 20 MG: 20 TABLET, FILM COATED ORAL at 08:55

## 2019-04-23 RX ADMIN — METOPROLOL TARTRATE 50 MG: 25 TABLET ORAL at 21:12

## 2019-04-23 RX ADMIN — DOXYCYCLINE HYCLATE 100 MG: 100 TABLET, COATED ORAL at 08:55

## 2019-04-23 RX ADMIN — FENTANYL CITRATE 25 MCG: 50 INJECTION, SOLUTION INTRAMUSCULAR; INTRAVENOUS at 03:24

## 2019-04-23 RX ADMIN — Medication 500000 UNITS: at 17:34

## 2019-04-23 RX ADMIN — POTASSIUM BICARBONATE 40 MEQ: 782 TABLET, EFFERVESCENT ORAL at 06:05

## 2019-04-23 RX ADMIN — DESMOPRESSIN ACETATE 40 MG: 0.2 TABLET ORAL at 21:12

## 2019-04-23 RX ADMIN — LISINOPRIL 5 MG: 5 TABLET ORAL at 08:55

## 2019-04-23 RX ADMIN — Medication 10 ML: at 08:56

## 2019-04-23 RX ADMIN — ASPIRIN 81 MG 81 MG: 81 TABLET ORAL at 08:55

## 2019-04-23 RX ADMIN — ALBUTEROL SULFATE 2.5 MG: 2.5 SOLUTION RESPIRATORY (INHALATION) at 10:14

## 2019-04-23 RX ADMIN — Medication 15 ML: at 21:12

## 2019-04-23 RX ADMIN — CLOPIDOGREL 75 MG: 75 TABLET, FILM COATED ORAL at 08:55

## 2019-04-23 RX ADMIN — Medication 10 ML: at 21:12

## 2019-04-23 RX ADMIN — OXYCODONE HYDROCHLORIDE AND ACETAMINOPHEN 2 TABLET: 5; 325 TABLET ORAL at 01:51

## 2019-04-23 RX ADMIN — Medication 15 ML: at 09:18

## 2019-04-23 RX ADMIN — Medication 500000 UNITS: at 08:56

## 2019-04-23 RX ADMIN — POTASSIUM CHLORIDE 40 MEQ: 1500 TABLET, EXTENDED RELEASE ORAL at 09:00

## 2019-04-23 RX ADMIN — DOXYCYCLINE HYCLATE 100 MG: 100 TABLET, COATED ORAL at 21:12

## 2019-04-23 RX ADMIN — FUROSEMIDE 40 MG: 40 TABLET ORAL at 08:55

## 2019-04-23 RX ADMIN — INSULIN GLARGINE 30 UNITS: 100 INJECTION, SOLUTION SUBCUTANEOUS at 08:55

## 2019-04-23 RX ADMIN — METOPROLOL TARTRATE 50 MG: 25 TABLET ORAL at 08:55

## 2019-04-23 RX ADMIN — Medication 500000 UNITS: at 21:12

## 2019-04-23 RX ADMIN — INSULIN LISPRO 2 UNITS: 100 INJECTION, SOLUTION INTRAVENOUS; SUBCUTANEOUS at 12:18

## 2019-04-23 RX ADMIN — DEXTROSE MONOHYDRATE 2 G: 50 INJECTION, SOLUTION INTRAVENOUS at 02:00

## 2019-04-23 RX ADMIN — DEXTROSE MONOHYDRATE 2 G: 50 INJECTION, SOLUTION INTRAVENOUS at 08:54

## 2019-04-23 RX ADMIN — FUROSEMIDE 40 MG: 40 TABLET ORAL at 17:34

## 2019-04-23 RX ADMIN — ALBUTEROL SULFATE 2.5 MG: 2.5 SOLUTION RESPIRATORY (INHALATION) at 20:32

## 2019-04-23 RX ADMIN — POTASSIUM CHLORIDE 40 MEQ: 1500 TABLET, EXTENDED RELEASE ORAL at 17:34

## 2019-04-23 ASSESSMENT — PAIN DESCRIPTION - PAIN TYPE
TYPE: SURGICAL PAIN

## 2019-04-23 ASSESSMENT — PAIN SCALES - GENERAL
PAINLEVEL_OUTOF10: 3
PAINLEVEL_OUTOF10: 7
PAINLEVEL_OUTOF10: 3
PAINLEVEL_OUTOF10: 7

## 2019-04-23 ASSESSMENT — PAIN DESCRIPTION - DESCRIPTORS
DESCRIPTORS: ACHING
DESCRIPTORS: DISCOMFORT
DESCRIPTORS: DISCOMFORT

## 2019-04-23 ASSESSMENT — PAIN DESCRIPTION - FREQUENCY
FREQUENCY: CONTINUOUS
FREQUENCY: CONTINUOUS

## 2019-04-23 ASSESSMENT — PAIN DESCRIPTION - LOCATION
LOCATION: LEG

## 2019-04-23 ASSESSMENT — ENCOUNTER SYMPTOMS
SHORTNESS OF BREATH: 0
VOMITING: 0
COUGH: 0
NAUSEA: 0
ABDOMINAL PAIN: 0

## 2019-04-23 ASSESSMENT — PAIN DESCRIPTION - ORIENTATION
ORIENTATION: RIGHT

## 2019-04-23 ASSESSMENT — PAIN SCALES - WONG BAKER: WONGBAKER_NUMERICALRESPONSE: 0

## 2019-04-23 ASSESSMENT — PAIN DESCRIPTION - ONSET: ONSET: ON-GOING

## 2019-04-23 ASSESSMENT — PAIN - FUNCTIONAL ASSESSMENT: PAIN_FUNCTIONAL_ASSESSMENT: PREVENTS OR INTERFERES SOME ACTIVE ACTIVITIES AND ADLS

## 2019-04-23 NOTE — PROGRESS NOTES
[facility]  Daily Progress Note    Surgeon:  Dr. Haydee Coelho  S/P :  Emerg Coronary artery bypass 4/7/19  Chest closure 4/8/19  EF: 45 %      Subjective:  Mr. Katie Read feels well today with no acute complaints. Pain is controlled. OOBTC and ambulating. Had hardware removed from right leg yesterday. Plans for Stent to right ICA ON Thursday    Physical Exam  Vital Signs: /70   Pulse 77   Temp 98.2 °F (36.8 °C) (Oral)   Resp 16   Ht 5' 8\" (1.727 m)   Wt 161 lb 6 oz (73.2 kg)   SpO2 99%   BMI 24.54 kg/m²  O2 Flow Rate (L/min): 2 L/min   Admit Weight: Weight: 176 lb 5.9 oz (80 kg)   WEIGHTWeight: 161 lb 6 oz (73.2 kg)     General: alert and oriented to person, place and time. Up in chair, No apparent distress. Heart:Normal S1 and S2.  Regular rhythm. No murmurs, gallops, or rubs. Lungs: clear to auscultation bilaterally  Abdomen: soft, non tender, non distended, BSx4  Extremities: negative  Wounds: clean and dry, healing appropriately.      Scheduled Meds:    furosemide  40 mg Oral BID    metoprolol tartrate  50 mg Oral BID    potassium chloride  40 mEq Oral BID WC    spironolactone  25 mg Oral Daily    insulin lispro  0-12 Units Subcutaneous TID WC    insulin lispro  0-6 Units Subcutaneous Nightly    insulin glargine  30 Units Subcutaneous Daily    lisinopril  5 mg Oral Daily    doxycycline hyclate  100 mg Oral 2 times per day    famotidine  20 mg Oral BID    enoxaparin  40 mg Subcutaneous Daily    nystatin  5 mL Oral 4x Daily    aspirin  81 mg Oral Daily    docusate  100 mg Oral Daily    dextrose  12.5 g Intravenous Once    albuterol  2.5 mg Nebulization Q6H    sodium chloride flush  10 mL Intravenous 2 times per day    sodium chloride flush  10 mL Intravenous 2 times per day    polyethylene glycol  17 g Oral Daily    chlorhexidine  15 mL Mouth/Throat BID    therapeutic multivitamin-minerals  1 tablet Oral Daily with breakfast    atorvastatin 40 mg Oral Nightly    clopidogrel  75 mg Oral Daily     Continuous Infusions:    dextrose 5 % and 0.45 % NaCl 75 mL/hr at 04/22/19 1750    sodium chloride      niCARdipine      dextrose         Data:  CBC:   Recent Labs     04/21/19  0502 04/22/19  0540 04/23/19  0431   WBC 18.8* 16.5* 17.7*   HGB 8.7* 8.9* 8.4*   HCT 29.1* 29.0* 28.8*   MCV 99.3 96.3 100.7   * 530* 539*     BMP:   Recent Labs     04/21/19  0502 04/22/19  0540 04/22/19 2026 04/23/19  0431    142 136 135   K 3.9 3.0* 3.2* 3.4*    107 101 101   CO2 25 25 24 25   BUN 16 11  --  10   CREATININE 0.44* 0.46*  --  0.45*     PT/INR:   Recent Labs     04/21/19  0502 04/22/19  0540 04/23/19  0431   PROTIME 14.1* 13.1* 13.3*   INR 1.4 1.3 1.3     APTT: No results for input(s): APTT in the last 72 hours. I/O:  I/O last 3 completed shifts: In: 2483.6 [P.O.:950; I.V.:1533.6]  Out: 1915 [Urine:1915]      Assessment & Plan:   Patient Active Problem List   Diagnosis    Cardiac arrest ([facility] [address]    Acute ST elevation myocardial infarction (STEMI) of anterior wall (HCC)    Severe left ventricular systolic dysfunction    Acute respiratory failure with hypoxemia (HCC)    Cardiogenic pulmonary edema (HCC)    PAD (peripheral artery disease) (HCC)    Subacute osteomyelitis of right tibia ([facility]    Exposed orthopaedic hardware ([facility]    Metabolic encephalopathy    Abnormal CT of the head    Fever chills    Hepatitis    Acute cerebral infarction associated with systemic hypoxia or ischemia    Multi infarct state    Carotid stenosis, bilateral    Hypokalemia    Hyperglycemia     1.  S/p CABG Emergent  POD 16  Plan cerebral angio and possible LU stent - Thursday  Swallow study - passed with dental soft diet  Ortho for right prosthesis removal yesterday   Need ID recs for abx at discharge     DVT ppx: Lovenox & SCD's while stationary  Patient is on BB, ASA, Statin therapy per protocol   Plan for discharge - Acute rehab possibly - will continue to follow for needs           The above recommendations including medications and orders were discussed and agreed upon with ,, the attending on service for the cardiothoracic surgery group today.      ISAAC Olivo, CNP

## 2019-04-23 NOTE — CARE COORDINATION
Received call from Gia Olmedo at [place], they do not accept pending medicaid. Dr. Von birmingham from [facility], would like to know about family support, will address with family when they arrive    [phone] spoke with Marva Constantino with [employer] to check on Medicaid status, he referred me to [facility] [phone]. Spoke with [facility], she will look into it. Spoke with Kate at [facility] intake. Gave application number, she will review with team    1106 received call from [place], [place] number is [phone]. This information given to Kate at [facility] left Vm with son Edel Bill to discuss support at time of Dc. Await return call. Noted plan for LU cuellar Thursday 1343 spoke with son Edel Bill regarding family support.   Edel Bill states that patient would not be able to stay with family at discharge as \"nobody has the room\"  Left VM for Amanda at [address] [place] [address]

## 2019-04-23 NOTE — PROGRESS NOTES
[facility]   Progress Note                   Date:   4/23/2019  Patient name: Colleen Dorantes  Date of admission:  4/7/2019  4:48 AM  MRN:   [id number]  YOB: 1965  PCP: No primary care provider on file. Reason for Admission: STEMI    Subjective:       S/P emergent CABG, POD#14    PEA Arrest, late presentation of STEMI with emergent bypass. Post op course complicated by neurological insult with multiple areas of ischemic stroke    Pt is alert and oriented to place  Not to time  SBP is stable  HR in 80 to 90        I/O last 3 completed shifts: In: 2483.6 [P.O.:950; I.V.:1533.6]  Out: 1915 [Urine:1915]  No intake/output data recorded.       Medications:   Scheduled Meds:   furosemide  40 mg Oral BID    metoprolol tartrate  50 mg Oral BID    potassium chloride  40 mEq Oral BID WC    spironolactone  25 mg Oral Daily    insulin lispro  0-12 Units Subcutaneous TID WC    insulin lispro  0-6 Units Subcutaneous Nightly    insulin glargine  30 Units Subcutaneous Daily    lisinopril  5 mg Oral Daily    doxycycline hyclate  100 mg Oral 2 times per day    famotidine  20 mg Oral BID    enoxaparin  40 mg Subcutaneous Daily    nystatin  5 mL Oral 4x Daily    aspirin  81 mg Oral Daily    docusate  100 mg Oral Daily    dextrose  12.5 g Intravenous Once    albuterol  2.5 mg Nebulization Q6H    sodium chloride flush  10 mL Intravenous 2 times per day    sodium chloride flush  10 mL Intravenous 2 times per day    polyethylene glycol  17 g Oral Daily    chlorhexidine  15 mL Mouth/Throat BID    therapeutic multivitamin-minerals  1 tablet Oral Daily with breakfast    atorvastatin  40 mg Oral Nightly    clopidogrel  75 mg Oral Daily       Continuous Infusions:   dextrose 5 % and 0.45 % NaCl 75 mL/hr at 04/22/19 1750    sodium chloride      niCARdipine      dextrose         CBC:   Recent Labs     04/21/19  0502 04/22/19  0540 04/23/19  0431   WBC 18.8* 16.5* 17.7*   HGB 8.7* 8.9* 8.4* * 530* 539*     BMP:    Recent Labs     04/21/19  0502 04/22/19  0540 04/22/19 2026 04/23/19  0431    142 136 135   K 3.9 3.0* 3.2* 3.4*    107 101 101   CO2 25 25 24 25   BUN 16 11  --  10   CREATININE 0.44* 0.46*  --  0.45*   GLUCOSE 110* 132*  --  136*     Hepatic:   No results for input(s): AST, ALT, ALB, BILITOT, ALKPHOS in the last 72 hours. Troponin: No results for input(s): TROPONINI in the last 72 hours. BNP: No results for input(s): BNP in the last 72 hours. Lipids:   No results for input(s): CHOL, HDL in the last 72 hours. Invalid input(s): LDLCALCU  INR:   Recent Labs     04/21/19  0502 04/22/19  0540 04/23/19  0431   INR 1.4 1.3 1.3       Objective:   Vitals: /64   Pulse 84   Temp 98.2 °F (36.8 °C) (Oral)   Resp 16   Ht 5' 8\" (1.727 m)   Wt 161 lb 6 oz (73.2 kg)   SpO2 97%   BMI 24.54 kg/m²   General appearance: alert and oriented  HEENT: Head: Normocephalic, no lesions,   Bilateral bruist   Neck: no JVD  Lungs:  rales present  Heart: regular rate and rhythm, S1, S2 normal, no murmur, click, rub or gallop  Abdomen: soft, non-tender; bowel sounds normal  Extremities: mild leg edema        EKG: Sinus tachycardia with anteroseptal infarct      Coronary Angiography: 4/7/19  LMCA: has distal 60% stenosis. LAD: has ostial 90% stenosis and long proximal 80% stenosis. D1 has proximal 70% stenosis. LCx: has 20% stenosis. OM1 is very small. OM2 has proximal 90% stenosis. RCA: has proximal 50% stenosis, mid 60% stenosis and distal 80% stenosis. Ramus: has ostial 99% stenosis.     The LV gram was performed in the DUTTON 30 position. LVEF: 15%. LV Wall Motion: severe hypokinesis of the anterior wall and mid inferior wall with apical akinesis. Echo 4/12/19:  Left ventricle is normal in size. Global left ventricular systolic function is mildly reduced. Calculated ejection fraction is 40. Left ventricular wall thickness is mildly increased.  Grade II diastolic dysfunction  Normal valvular function      Assessment:   1. PEA Arrest (Multiple times) with ROSC in few minutes - S/P extubation( 4/19)  2. Late presentation of anteroseptal STEMI   3. Multivessel CAD s/p emergent CABG on 4/7 with LIMA-LAD, SVG-PDA,OM 1 & distal circumflex). 4. S/P sternal closure on 4/8  5. Ischemic cardiomyopathy with acute systolic CHF (EF 30% on ANGELA and TTE)  6. NSVT  7. Post op anemia stable  8. Post op thrombocytopenia resolved  9. Acute pulmonary edema s/p extubation( 4/19)  10. Sepsis resolved  11. Acute renal failure - resolved  12. Metabolic encephalopathy resolving  13. Exposed orthopedic hardware S/P removal of Protruding orthopedic nail from Rt lateral knee   14. S/P ORIF Rt proximal tibia  15. Shock Liver with AST and ALT in 3000s (AST 3077, ALT 3818) resolved  16. Gallbladder sludge with concern for cholecystitis  17. Multiple embolic acute infarcts in brain( 4/14)  18. Left internal carotid artery occlusion  19. ICU Delirium resolving  20        Dysphagia with dental soft diet    Treatment Plan:   1 S/P extubation( 4/19). Po lasix 40 BID. lopressor  50 BID. Lisinopril 5 mg. Aldactone 25 mg for GDMT. Hold the anti HTN if SBP<110 as pt had recent CVA. 2 continue with rest of medications for the patient including asa, plavix and lipitor. 3 Seroquel HS for the ICU delirium     Electronically signed on 04/23/19 at 11:50 AM by:  Fellow cardiology    I performed a history and physical examination of the patient and discussed management with the resident. I reviewed the residents note and agree with the documented findings and plan of care. Any areas of disagreement are noted on the chart. I was personally present for the key portions of any procedures. I have documented in the chart those procedures where I was not present during the key portions.  I have personally evaluated this patient and have completed at least one if not all key elements of the E/M (history, physical exam, and MDM). Additional findings are as noted. Continue ASA/plavix and statin. Continue BB and ACE. LVEF 40%. Continue lasix for systolic CHF. Awaiting placement.   Emma Pacheco MD

## 2019-04-23 NOTE — CONSULTS
Inpatient consult to PM&R - Physiatry  Consult performed by: Gemini Le MD  Consult ordered by: Lonzell Rubinstein, APRN - [facility]             Physical Medicine & Rehabilitation  Consult Note      Admitting Physician:   Bobbi Cruz, *    Primary Care Provider:   No primary care provider on file. Reason for Consult:  Acute Inpatient Rehabilitation    Chief Complaint: Cardiac arrest    History of Present Illness:  Referring Provider is requesting an evaluation for appropriate placement upon discharge from acute care. History from chart review and patient. Tawana Rondon is a 46 y.o. male admitted to [place] [address] on 4/7/2019. Patient was admitted to the hospital for dyspnea and difficulty breathing. He was found to have PEA arrest and to stay me. He underwent an emergent cardiac cath which revealed multivessel CAD with severe LV dysfunction with EF of 15% and severe hypokinesis. He underwent an emergent CABG ×4 on 4/7/2019 with closure of chest wall on 4/8/2019. The patient had bedside screw removal of right hardware on 4/7/2019. He was treated for acute kidney injury post CABG which was improved with fluid resuscitation and pressors support. CT head revealed scattered areas of encephalomalacia in bifrontal and left parietal areas consistent with remote infarct and possible subacute infarct in the left lower parietal lobe. Patient underwent hardware removal of right tibia with I&D and sharp debridement of subcutaneous tissue and bone on 4/22/2019 by Dr. Aliza Ramey.     Review of Systems:  Constitutional: negative for anorexia, chills, fatigue, fevers, sweats and weight loss  Eyes: negative for redness and visual disturbance  Ears, nose, mouth, throat, and face: negative for earaches, sore throat and tinnitus  Respiratory: negative for cough and shortness of breath  Cardiovascular: negative for chest pain, dyspnea and palpitations  Gastrointestinal: negative for abdominal pain, change in bowel habits, constipation, nausea and vomiting  Genitourinary:negative for dysuria, frequency, hesitancy and urinary incontinence  Integument/breast: negative for pruritus and rash  Musculoskeletal:negative for muscle weakness and stiff joints  Neurological: negative for dizziness, headaches and weakness  Behavioral/Psych: negative for decreased appetite, depression and fatigue     Premorbid function:  Independent with all prior to admission. Patient denies use of an assistive device. Current function:    PT:  Restrictions/Precautions: General Precautions, Surgical Protocols, Fall Risk  Sternal Precautions: Emergent CABG X4  Other position/activity restrictions: Fall Risk   Transfers  Sit to Stand: Moderate Assistance  Stand to sit: Moderate Assistance  Squat Pivot Transfers: Maximum Assistance  Comment: Pt not able to assist much with SPT secondary to LE and ankle/foot weakness. Ambulation 1  Surface: level tile  Device: Rolling Walker  Assistance: Moderate assistance  Quality of Gait: WBAT Right LE, Flexed posture  Distance: Pt stood at walker and was not able to advance left LE. Stood x90 seconds. attemtped marching in place. Pt not able to clear foot from floor. Comments: Posterior LOB x2    Transfers  Sit to Stand: Moderate Assistance  Stand to sit: Moderate Assistance  Squat Pivot Transfers: Maximum Assistance  Comment: Pt not able to assist much with SPT secondary to LE and ankle/foot weakness. Ambulation  Ambulation?: Yes  Ambulation 1  Surface: level tile  Device: Rolling Walker  Assistance: Moderate assistance  Quality of Gait: WBAT Right LE, Flexed posture  Distance: Pt stood at walker and was not able to advance left LE. Stood x90 seconds. attemtped marching in place. Pt not able to clear foot from floor. Comments: Posterior LOB x2    Surface: level tile  Ambulation 1  Surface: level tile  Device: Rolling Walker  Assistance:  Moderate assistance  Quality of Gait: WBAT Right LE, Flexed Elda Abbott off roof, external fixation and internal hardware    TONSILLECTOMY         Allergies:    No Known Allergies     Current Medications:   Current Facility-Administered Medications: furosemide (LASIX) tablet 40 mg, 40 mg, Oral, BID  metoprolol tartrate (LOPRESSOR) tablet 50 mg, 50 mg, Oral, BID  potassium chloride (KLOR-CON M) extended release tablet 40 mEq, 40 mEq, Oral, BID WC  potassium chloride (KLOR-CON M) extended release tablet 40 mEq, 40 mEq, Oral, PRN **OR** potassium bicarb-citric acid (EFFER-K) effervescent tablet 40 mEq, 40 mEq, Oral, PRN **OR** potassium chloride 10 mEq/100 mL IVPB (Peripheral Line), 10 mEq, Intravenous, PRN  potassium chloride 10 mEq/100 mL IVPB (Peripheral Line), 10 mEq, Intravenous, PRN  dextrose 5 % and 0.45 % sodium chloride infusion, , Intravenous, Continuous  spironolactone (ALDACTONE) tablet 25 mg, 25 mg, Oral, Daily  potassium chloride 10 mEq/100 mL IVPB (Peripheral Line), 10 mEq, Intravenous, PRN  insulin lispro (HUMALOG) injection vial 0-12 Units, 0-12 Units, Subcutaneous, TID WC  insulin lispro (HUMALOG) injection vial 0-6 Units, 0-6 Units, Subcutaneous, Nightly  insulin glargine (LANTUS) injection vial 30 Units, 30 Units, Subcutaneous, Daily  0.45 % sodium chloride infusion, , Intravenous, Continuous  lisinopril (PRINIVIL;ZESTRIL) tablet 5 mg, 5 mg, Oral, Daily  magnesium hydroxide (MILK OF MAGNESIA) 400 MG/5ML suspension 30 mL, 30 mL, Oral, Daily PRN  doxycycline hyclate (VIBRA-TABS) tablet 100 mg, 100 mg, Oral, 2 times per day  fentaNYL (SUBLIMAZE) injection 25 mcg, 25 mcg, Intravenous, Q1H PRN  sodium chloride flush 0.9 % injection 10 mL, 10 mL, Intravenous, PRN  labetalol (NORMODYNE;TRANDATE) injection 10 mg, 10 mg, Intravenous, Q6H PRN  niCARdipine (CARDENE) 20 mg in 0.9 % sodium chloride 200 mL infusion, 2.5 mg/hr, Intravenous, Continuous  famotidine (PEPCID) tablet 20 mg, 20 mg, Oral, BID  enoxaparin (LOVENOX) injection 40 mg, 40 mg, Subcutaneous, Daily  nystatin (MYCOSTATIN) 910245 UNIT/ML suspension 500,000 Units, 5 mL, Oral, 4x Daily  aspirin chewable tablet 81 mg, 81 mg, Oral, Daily  docusate (COLACE) 50 MG/5ML liquid 100 mg, 100 mg, Oral, Daily  albuterol (PROVENTIL) nebulizer solution 2.5 mg, 2.5 mg, Nebulization, Q6H PRN  dextrose injection 12.5 g, 12.5 g, Intravenous, Once  albuterol (PROVENTIL) nebulizer solution 2.5 mg, 2.5 mg, Nebulization, Q6H  sodium chloride flush 0.9 % injection 10 mL, 10 mL, Intravenous, 2 times per day  sodium chloride flush 0.9 % injection 10 mL, 10 mL, Intravenous, PRN  sodium chloride flush 0.9 % injection 10 mL, 10 mL, Intravenous, 2 times per day  sodium chloride flush 0.9 % injection 10 mL, 10 mL, Intravenous, PRN  calcium chloride 1 g in sodium chloride 0.9 % 100 mL IVPB, 1 g, Intravenous, PRN  magnesium sulfate 1 g in dextrose 5% 100 mL IVPB, 1 g, Intravenous, PRN  potassium chloride 20 mEq/50 mL IVPB (Central Line), 20 mEq, Intravenous, PRN  acetaminophen (TYLENOL) tablet 650 mg, 650 mg, Oral, Q4H PRN  acetaminophen (TYLENOL) suppository 650 mg, 650 mg, Rectal, Q4H PRN  oxyCODONE-acetaminophen (PERCOCET) 5-325 MG per tablet 1 tablet, 1 tablet, Oral, Q4H PRN **OR** oxyCODONE-acetaminophen (PERCOCET) 5-325 MG per tablet 2 tablet, 2 tablet, Oral, Q4H PRN  diphenhydrAMINE (BENADRYL) tablet 25 mg, 25 mg, Oral, Nightly PRN  polyethylene glycol (GLYCOLAX) packet 17 g, 17 g, Oral, Daily  bisacodyl (DULCOLAX) suppository 10 mg, 10 mg, Rectal, Daily PRN  ondansetron (ZOFRAN) injection 4 mg, 4 mg, Intravenous, Q8H PRN  chlorhexidine (PERIDEX) 0.12 % solution 15 mL, 15 mL, Mouth/Throat, BID  hydrALAZINE (APRESOLINE) injection 5 mg, 5 mg, Intravenous, Q5 Min PRN  therapeutic multivitamin-minerals 1 tablet, 1 tablet, Oral, Daily with breakfast  atorvastatin (LIPITOR) tablet 40 mg, 40 mg, Oral, Nightly  clopidogrel (PLAVIX) tablet 75 mg, 75 mg, Oral, Daily  albumin human 5 % bottle 25 g, 25 g, Intravenous, PRN  glucose (GLUTOSE) 40 % oral gel 15 g, 15 g, Oral, PRN  dextrose 50 % solution 12.5 g, 12.5 g, Intravenous, PRN  glucagon (rDNA) injection 1 mg, 1 mg, Intramuscular, PRN  dextrose 5 % solution, 100 mL/hr, Intravenous, PRN    Social History:  Lives with:  Alone in a 1 level was 22 steps to his apartment. Social History     Socioeconomic History    Marital status: Unknown     Spouse name: None    Number of children: None    Years of education: None    Highest education level: None   Occupational History    None   Social Needs    Financial resource strain: None    Food insecurity:     Worry: None     Inability: None    Transportation needs:     Medical: None     Non-medical: None   Tobacco Use    Smoking status: Former Smoker   Substance and Sexual Activity    Alcohol use: None    Drug use: None    Sexual activity: None   Lifestyle    Physical activity:     Days per week: None     Minutes per session: None    Stress: None   Relationships    Social connections:     Talks on phone: None     Gets together: None     Attends Congregational service: None     Active member of club or organization: None     Attends meetings of clubs or organizations: None     Relationship status: None    Intimate partner violence:     Fear of current or ex partner: None     Emotionally abused: None     Physically abused: None     Forced sexual activity: None   Other Topics Concern    None   Social History Narrative    None       Family History:   History reviewed. No pertinent family history. Radiology:    Chest x-ray 4/19/2019-  FINDINGS:   Endotracheal tube with the tip just below the level of the clavicles. Enteric tube with the tip within the stomach.       Partial clearing at the right lung base.  Persistent left lung base opacities.       Sternotomy.  Cardiomegaly. Donel Winston renata.  No definite pulmonary edema.           Impression   Partial clearing at the right lung base.  Persistent left lung base opacity.            Diagnostics:    CBC:   Recent Labs 04/21/19  0502 04/22/19  0540 04/23/19 0431   WBC 18.8* 16.5* 17.7*   RBC 2.93* 3.01* 2.86*   HGB 8.7* 8.9* 8.4*   HCT 29.1* 29.0* 28.8*   MCV 99.3 96.3 100.7   RDW 13.8 13.5 13.6   * 530* 539*     BMP:   Recent Labs     04/21/19  0502 04/22/19  0540 04/22/19 2026 04/23/19 0431    142 136 135   K 3.9 3.0* 3.2* 3.4*    107 101 101   CO2 25 25 24 25   BUN 16 11  --  10   CREATININE 0.44* 0.46*  --  0.45*     BNP: No results for input(s): BNP in the last 72 hours. PT/INR:   Recent Labs     04/21/19  0502 04/22/19 0540 04/23/19 0431   PROTIME 14.1* 13.1* 13.3*   INR 1.4 1.3 1.3     APTT: No results for input(s): APTT in the last 72 hours. CARDIAC ENZYMES: No results for input(s): CKMB, CKMBINDEX, TROPONINT in the last 72 hours. Invalid input(s): CKTOTAL;3  FASTING LIPID PANEL:  Lab Results   Component Value Date    CHOL 56 04/17/2019    HDL 13 (L) 04/17/2019    TRIG 107 04/17/2019     LIVER PROFILE: No results for input(s): AST, ALT, ALB, BILIDIR, BILITOT, ALKPHOS in the last 72 hours. Physical Exam:  /64   Pulse 84   Temp 98.2 °F (36.8 °C) (Oral)   Resp 16   Ht 5' 8\" (1.727 m)   Wt 161 lb 6 oz (73.2 kg)   SpO2 97%   BMI 24.54 kg/m²   Alert, no distress. Oriented to person and place. Oriented to month and year after multiple attempts  Good speech and language function. Lungs - no wheezing, no respiratory distress. Heart regular. Abdomen - positive bowel sounds, non-distended, non-tender. No calf tenderness or edema. Sensory: Intact in BUE and BLE to soft sensation. Motor: Muscle tone and bulk are normal bilaterally. Motor strength- patient moving bilateral upper extremities antigravity with good resistance. Bilateral lower extremity trace movement proximally.   Right DF- 0/5    Impression:  Patient is a 58-year-old right handed male complicated medical course including cardiac arrest, CAD status post CABG, subacute stroke, infected hardware and right tibia status post removal, metabolic encephalopathy, and respiratory failure with hypoxemia    Recommendations:    1. The patient appears to tolerate all 3 disciplines of therapy. He appears to be appropriate for acute inpatient rehab to work on cognition, self-care, ADLs, bed mobility, transfers, and ambulation. 2. CV- patient status post PEA arrest, multivessel CAD status post emergent CABG, history of ischemic cardiomyopathy with acute systolic CHF with compensated new EF 40% on ANGELA and TTE. The patient is on Lopressor, spironolactone, and lisinopril. Holding medications for systolic blood pressure less than 110.  3. Orthopedics- status post removal of infected hardware. The patient is weightbearing as tolerated to the right lower extremity. Patient has AFO ordered for right lower extremity foot drop. 4. Infectious disease-infectious disease following for infected hardware and chronic leg osteomyelitis colitis. Patient is currently on doxycycline and Ancef. Will await plans for antibiotics. Will need stop dates. 5. Neuro--on aspirin, Lipitor, and Plavix. 6. Pulmonary-patient extubated on 4/19/2019. on albuterol nebulizer treatments. 7. DVT prophylaxis-on Lovenox. 8. Home support- per social work today patient has large family to support. She Payton Rodriguez) will clarify if he has someone that can give supervision. It was my pleasure to evaluate Colleen Dorantes today. Please call with questions. Tate Horton MD        This note is created with the assistance of a speech recognition program.  While intending to generate a document that actually reflects the content of the visit, the document can still have some errors including those of syntax and sound a like substitutions which may escape proof reading.   In such instances, actual meaning can be extrapolated by contextual diversion.

## 2019-04-23 NOTE — PROGRESS NOTES
Physical Therapy  Facility/Department: [facility] CAR 1  Daily Treatment Note  NAME: Erlinda Stein  : 1965  MRN: [id number]    Date of Service: 2019    Discharge Recommendations: Further therapy recommended at discharge and the patient should be able to tolerate at least 3 hours per day over 5 days or 15 hours over 7 days. Patient Diagnosis(es): The primary encounter diagnosis was Acute ST elevation myocardial infarction (STEMI) of anterior wall ([facility] [address] A diagnosis of Cardiac arrest [facility]) was also pertinent to this visit. has a past medical history of CAD (coronary artery disease), Cardiac arrest ([facility] [address] Osteolysis, PEA (Pulseless electrical activity) ([facility] [address] and PVD [facility]peripheral vascular disease) [facility] has a past surgical history that includes Cardiac catheterization (2019); Femur Surgery; Tonsillectomy; Coronary artery bypass graft (2019); Hardware Removal (Right, 2019); Tibia fracture surgery (Right); Coronary artery bypass graft (N/A, 2019); RECONSTRUCTIVE REPAIR STERNAL (N/A, 2019); and Hardware Removal (Right, 2019). Restrictions  Restrictions/Precautions  Restrictions/Precautions: General Precautions, Surgical Protocols, Fall Risk  Required Braces or Orthoses?: No  Position Activity Restriction  Sternal Precautions: No Pushing, No Pulling, 5# Lifting Restrictions  Sternal Precautions: Emergent CABG X4  Other position/activity restrictions: Fall Risk  Subjective    Pt sitting up in his chair. Pt and RN agreeable for therapy this morning. Pt needs an AFO for his R foot. Pt has telesitter and a human sitter present in room. Pt has no c/o pain. Orientation  Overall Orientation Status: Within Functional Limits    Objective   Transfers  Sit to Stand: Moderate Assistance  Stand to sit: Moderate Assistance  Comment: Pt has a retro lean with standing. VC's given for proper hand placement for safe transfers, with poor return from pt.   Ambulation  Ambulation?: Yes  Ambulation 1  Surface: level tile  Device: Rolling Walker  Assistance: Moderate assistance  Quality of Gait: WBAT Right LE, Flexed posture,AFO on R foot,step too gait with L foot. Pt demonstrates weak Dorsiflexion on L foot. Distance: 150 ft  Comments: Posterior LOB x2,Chair to follow. Pt required 3 seated rest breaks d/t fatigue. Pt required assistance with RW advancement. Exercises  Seated LE exercise program: Long Arc Quads, isometric hip abduction/adduction, heel/toe raises, and marches. Reps: 15 x each with 2 lb wt on B LE's. Comment: Verbal and tactile cues for ex's on his R LE. Pt had a good return with cues. Assessment     Assessment: Improving alertness and participation. Pt ambulated 150ft Mod A with use of RW. Pt took 3 seated rest breaks d/t fatigue and weakness in LE's. Pt required VC's for hand placement with transfers with poor return. Treatment Diagnosis: general weakness; difficulty walking  Prognosis: Good  Patient Education: Mobility and getting out of bed to chair. Activity Tolerance  Activity Tolerance: Patient Tolerated treatment well  Activity Tolerance: not able to bear wt on right enough to advance left LE. Fatigue with exercises        Goals  Short term goals  Time Frame for Short term goals: 14 visits  Short term goal 1: Pt to transfer sit to stand with min A + 1. Short term goal 2: Pt to ambulate with least restrictive AD 40ft with no LOB. Short term goal 3: Pt to demonstrate good sitting balance. Short term goal 4: Pt to tolerate 20-30 mins ther ex/act for improved strength and endurance for ADL's.    Patient Goals   Patient goals : none stated    Plan    Plan  Times per week:  BID  Times per day: Twice a day  Current Treatment Recommendations: ROM, Strengthening, Functional Mobility Training, Endurance Training, Transfer Training, Gait Training, Stair training, Balance Training, Neuromuscular Re-education, Home Exercise Program, Safety Education & Training  Safety Devices  Type of devices: All fall risk precautions in place, Call light within reach, Nurse notified, Gait belt, Chair alarm in place, Left in chair, Patient at risk for falls  Restraints  Initially in place: No  Restraints:  chair alarm     Therapy Time   Individual Concurrent Group Co-treatment   Time In  910         Time Out  945         Minutes  [address] [address], [place]  Treatment performed by Student PTA under the supervision of co-signing PTA who agrees with all treatment and documentation.    David Cole, PTA

## 2019-04-23 NOTE — PROGRESS NOTES
Physical Therapy  Facility/Department: [facility] CAR 1  Daily Treatment Note  NAME: Celine Yuen  : 1965  MRN: [id number]    Date of Service: 2019    Discharge Recommendations: Further therapy recommended at discharge and the patient should be able to tolerate at least 3 hours per day over 5 days or 15 hours over 7 days. Patient Diagnosis(es): The primary encounter diagnosis was Acute ST elevation myocardial infarction (STEMI) of anterior wall ([facility] [address] A diagnosis of Cardiac arrest [facility]) was also pertinent to this visit. has a past medical history of CAD (coronary artery disease), Cardiac arrest ([facility] [address] Osteolysis, PEA (Pulseless electrical activity) ([facility] [address] and [facility]peripheral [facility] has a past surgical history that includes Cardiac catheterization (2019); Femur Surgery; Tonsillectomy; Coronary artery bypass graft (2019); Hardware Removal (Right, 2019); Tibia fracture surgery (Right); Coronary artery bypass graft (N/A, 2019); RECONSTRUCTIVE REPAIR STERNAL (N/A, 2019); Hardware Removal (Right, 2019); and REMOVE HARDWARE FEMUR (N/A, 2019). Restrictions  Restrictions/Precautions  Restrictions/Precautions: Cardiac, General Precautions, Surgical Protocols, Fall Risk  Required Braces or Orthoses?: No  Position Activity Restriction  Sternal Precautions: No Pushing, No Pulling, 5# Lifting Restrictions  Sternal Precautions: Emergent CABG X4  Other position/activity restrictions: Fall Risk  Subjective    Pt in bed upon arrival. Pt and RN agreeable for therapy this afternoon. Pt needs an AFO for his R foot. Pt has telesitter and a human sitter present in room. Pt c/o pain in R knee, but unable to rate when asked to. Orientation  Overall Orientation Status: Within Functional Limits    Objective   Transfers  Sit to Stand: Moderate Assistance  Stand to sit: Moderate Assistance  Comment: Pt has a retro lean with standing.  VC's given for proper hand placement for safe transfers, with poor return from pt. Ambulation  Ambulation?: Yes  Ambulation 1  Surface: level tile  Device: Rolling Walker  Other apparatus: 1.5 L O2, chair to follow  Assistance: Moderate assistance  Quality of Gait: WBAT Right LE, Flexed posture,AFO on R foot,step too gait with L foot. Pt demonstrates weak Dorsiflexion on L foot. Distance: 100 ft  Comments: Posterior LOB x2,Chair to follow. Pt required 2 seated rest breaks d/t fatigue. Pt required assistance with RW, pt veers to the left, needs min A to correct  Exercises  Seated LE exercise program: Long Arc Quads, isometric hip abduction/adduction, heel/toe raises, and marches. Reps: 15 x each with 2 lb wt on B LE's. Comment: Verbal and tactile cues for ex's on his R LE. Pt had a good return with cues. Assessment   Assessment: Pt ambulated 100ft Mod A with use of RW. Pt took 2 seated rest breaks d/t fatigue and weakness in LE's. Pt required VC's for hand placement with transfers with good return. Treatment Diagnosis: general weakness; difficulty walking  Prognosis: Good  Patient Education: Mobility and getting out of bed to chair. Activity Tolerance  Activity Tolerance: Patient Tolerated treatment well  Activity Tolerance: not able to bear wt on right enough to advance left LE. Fatigue with exercises        Goals  Short term goals  Time Frame for Short term goals: 14 visits  Short term goal 1: Pt to transfer sit to stand with min A + 1. Short term goal 2: Pt to ambulate with least restrictive AD 40ft with no LOB. Short term goal 3: Pt to demonstrate good sitting balance. Short term goal 4: Pt to tolerate 20-30 mins ther ex/act for improved strength and endurance for ADL's.    Patient Goals   Patient goals : none stated    Plan    Plan  Times per week:  BID  Times per day: Twice a day  Current Treatment Recommendations: ROM, Strengthening, Functional Mobility Training, Endurance Training, Transfer Training, Gait Training, Stair

## 2019-04-23 NOTE — PROGRESS NOTES
Occupational Therapy  Facility/Department: [facility] CAR 1  Daily Treatment Note  NAME: Amira Johnston  : 1965  MRN: [id number]    Date of Service: 2019    Discharge Recommendations:  Further therapy recommended at discharge. Assessment   Performance deficits / Impairments: Decreased functional mobility ; Decreased cognition;Decreased high-level IADLs;Decreased endurance;Decreased ADL status; Decreased balance;Decreased safe awareness;Decreased strength  Treatment Diagnosis: Emergent CABG X4  Prognosis: Good  Patient Education: OT POC, Orientation. REQUIRES OT FOLLOW UP: Yes  Activity Tolerance  Activity Tolerance: Patient limited by fatigue;Patient limited by pain;Treatment limited secondary to decreased cognition  Safety Devices  Safety Devices in place: Yes  Type of devices: All fall risk precautions in place;Call light within reach; Left in bed;Nurse notified; Bed alarm activated       Patient Diagnosis(es): The primary encounter diagnosis was Acute ST elevation myocardial infarction (STEMI) of anterior wall ([facility] [address] A diagnosis of Cardiac arrest [facility]) was also pertinent to this visit. has a past medical history of CAD (coronary artery disease), Cardiac arrest ([facility] [address] Osteolysis, PEA (Pulseless electrical activity) ([facility] [address] and PVD (peripheral vascular disease) ([facility] [address] has a past surgical history that includes Cardiac catheterization (2019); Femur Surgery; Tonsillectomy; Coronary artery bypass graft (2019); Hardware Removal (Right, 2019); Tibia fracture surgery (Right); Coronary artery bypass graft (N/A, 2019); RECONSTRUCTIVE REPAIR STERNAL (N/A, 2019); Hardware Removal (Right, 2019); and REMOVE HARDWARE FEMUR (N/A, 2019).     Restrictions  Restrictions/Precautions  Restrictions/Precautions: Cardiac, General Precautions, Surgical Protocols, Fall Risk  Required Braces or Orthoses?: No  Position Activity Restriction  Sternal Precautions: No Pushing, No Pulling, 5# Lifting Restrictions  Sternal Precautions: Emergent CABG X4  Other position/activity restrictions: Fall Risk  Subjective   General  Patient assessed for rehabilitation services?: Yes  Family / Caregiver Present: Yes(Parents arrived at end of session)  Diagnosis: CABGx4, POD#14, RLE hardware infection  Pain Assessment  Pain Assessment: 0-10  Pain Level: 3  Pain Type: Surgical pain  Pain Location: Leg  Pain Orientation: Right  Pain Frequency: Continuous  Response to Pain Intervention: Patient Satisfied  Vital Signs  Patient Currently in Pain: Yes   Orientation   A&OX4     Objective   ADL  Feeding: Supervision;Setup; Increased time to complete  Grooming: Stand by assistance; Moderate assistance;Setup;Verbal cueing; Increased time to complete(SBA to wash face and brush teeth, Mod to wash/comb hair)  UE Bathing: Minimal assistance;Setup;Verbal cueing; Increased time to complete(w/back)  LE Bathing: Moderate assistance;Setup;Verbal cueing; Increased time to complete(w/lower legs/feet)  UE Dressing: Moderate assistance;Setup;Verbal cueing; Increased time to complete(w/gown)  LE Dressing: Maximum assistance;Setup;Verbal cueing; Increased time to complete(w/footies)  Toileting: Stand by assistance;Maximum assistance(Wearing brief, using urinal at bed side)    Additional Comments: Pt just back to bed after PT earlier this am. Tele sitter and sitter in room upon arrival. A&OX4 this date. HOB elevated and setup at tray table for self care using sx soap (see above for LOF). Pt remained in bed, family in at end of tx. Call light and phone in reach. Bed alarm activated. Transfers  Stand Step Transfers: Unable to assess  Sit to stand: Unable to assess  Stand to sit: Unable to assess    Transfer Comments: RN did not want pt back up to chair at this time d/t recent fall and going for sx around noon to have hardware from knee removed. Pt needed vc's to complete tasks. Pt left in bed, call light and bed alarm activated at end of tx.        Plan Plan  Times per week: 5x  Current Treatment Recommendations: Balance Training, Functional Mobility Training, Endurance Training, Equipment Evaluation, Education, & procurement, Home Management Training, Patient/Caregiver Education & Training, Self-Care / ADL, Safety Education & Training, Pain Management, Strengthening    Goals  Short term goals  Time Frame for Short term goals: Pt will by discharge   Short term goal 1: demo ADL UB/LB dressing/bathing activity seated with setup and min A/min vc's  Short term goal 2: demo RUE of 5/5 grossly for use in ADL completion  Short term goal 3: demo good safety awareness during func mob in room with max Ax1 and RW  Short term goal 4: identify all sternal precautions with 2 vc's  Short term goal 5: demo activity tolerance for 30 min  Short term goal 6: demo standing during func activity for 3 min with LRD and mod A     Therapy Time   Individual Concurrent Group Co-treatment   Time In  1100         Time Out  1145         Minutes                 [address], [place]

## 2019-04-23 NOTE — PROGRESS NOTES
Recommendations     · Discontinuation of therapy    Coordination of Outpatient Care:   · Estimated Length of IV antimicrobials: D/C 4-11-19  · Patient will need Midline Catheter Insertion:   · Patient will need PICC line Insertion:  · Patient will need: Home IV , [facility],  SNF,  LTAC: TBD  · Patient will need outpatient wound care:TBD    Chief complaint/reason for consultation:   · Emergent CABG  · Protruding orthopedic nail from Rt knee      History of Present Illness:   Millicent Bence is a 46y.o.-year-old  male who was initially admitted on 4/7/2019. Patient seen at the request of Nia Duncan. INITIAL HISTORY:     Patient apparently developed chest pain and called EMS. Had cardiac arrest at the scene, was resuscitated and brought to ER where he had second cardiac arrest. Was resuscitated once again and taken to OR for emergent CABG x 4. He had another cardiac arrest in OR. Has survived the above and is back in the ICU. Found to have area of malodor on lateral aspect Rt knee, corresponding to a protruding orthopedic screw from a remote ORIF of the Rt  Tibial plateau. The screw has been removed and sent for culture. X rays show healed fracture of the proximal tibia. There is additional hardware in place that will likely need to be removed if patient survives. CURRENT EVALUATION : 4/23/2019    Patient seen and examined this morning. Stable  Sitting up. Conversant    Had leg hardware removed on 4-22-19  Site clean. Some swelling and pain present  Hardware material: No growth  Has foot drop    Clinically showing overall improvement. On nasal 02    Labs reviewed: 4/23/2019    CT of head 4-13-19 showed scattered infarcts of different ages. MRI follow up 4/14 showed multiple new embolic strokes. Absent flow Lt carotid artery. Ultrasound 4-15-19 with occlusion of Lt internal carotid artery. CXR 4-15-19: Bilateral effusions, parahilar and lower zone airspace opacities.  Retrocardiac consolidation. Changes are those of CHF. Na 149 -> 150 -> 135  K 3.5 -> 3.4  WBCs 17.5 -> 25.1 -> 30 0> 20.1 -> 18.8-->17.7  Hgb 9.1. -->8.7 -> 9.4 -->8.4    ALMITA has resolved with creatinine of 0.44 today. Echocardiogram showed mildly reduced ejection fraction. No clot visualized. Neuroendovascular surgery is planning on cerebral angiogram with possible stenting. Cultures:  Wound:  · Orthopedic screw: Leg screw cultures found. Direct exam showed many gram + cocci in clusters and few gram - rods, NGTD. Lab reports quality of sample is questioned and to interpret results carefully. · Hardware: 4-2-19: No growth   Blood:  · Blood cultures 4/11 x 2 with no growth to date. Urine:  · Urine culture 4/12 with no growth to date. Sputum  · Sputum culture 4/12 with mixed bacterial morphotypes. Discussed with RN, family. I have personally reviewed the past medical history, past surgical history, medications, social history, and family history, and I have updated the database accordingly.   Past Medical History:     Past Medical History:   Diagnosis Date    CAD (coronary artery disease) 04/07/2019    Cardiac arrest ([facility] [address] 04/07/2019    Osteolysis 04/07/2019    R Knee hardware    PEA (Pulseless electrical activity) ([facility] [address] 04/07/2019    PVD (peripheral vascular disease) ([facility] [address] 04/07/2019       Past Surgical  History:     Past Surgical History:   Procedure Laterality Date    CARDIAC CATHETERIZATION  04/07/2019    MVD-Stv    CORONARY ARTERY BYPASS GRAFT  04/07/2019    x4 per Dr. Joe Burroughs, [address] [place] [address] GRAFT N/A 4/7/2019    EMERGENT CPR,  CORONARY ARTERY BYPASS X4, ON PUMP ,CHEST LEFT OPEN; ANGELA CHEEMA PER ANESTHESIA performed by Isidro Reyes MD at [address] [place] [address],[address]      unsure if correct known tibial surgery    HARDWARE REMOVAL Right 04/07/2019    R Knee internal screw protruding through skin    HARDWARE REMOVAL Right 04/22/2019    HARDWARE REMOVAL RIGHT TIBIA,    RECONSTRUCTIVE REPAIR STERNAL N/A 4/8/2019    STERNAL  WOUND WASHOUT AND CLOSURE S/P CABG performed by Jane Smith MD at [facility] off roof, external fixation and internal hardware    TONSILLECTOMY         Medications:      furosemide  40 mg Oral BID    metoprolol tartrate  50 mg Oral BID    potassium chloride  40 mEq Oral BID WC    spironolactone  25 mg Oral Daily    insulin lispro  0-12 Units Subcutaneous TID WC    insulin lispro  0-6 Units Subcutaneous Nightly    insulin glargine  30 Units Subcutaneous Daily    lisinopril  5 mg Oral Daily    doxycycline hyclate  100 mg Oral 2 times per day    famotidine  20 mg Oral BID    enoxaparin  40 mg Subcutaneous Daily    nystatin  5 mL Oral 4x Daily    aspirin  81 mg Oral Daily    docusate  100 mg Oral Daily    dextrose  12.5 g Intravenous Once    albuterol  2.5 mg Nebulization Q6H    sodium chloride flush  10 mL Intravenous 2 times per day    sodium chloride flush  10 mL Intravenous 2 times per day    polyethylene glycol  17 g Oral Daily    chlorhexidine  15 mL Mouth/Throat BID    therapeutic multivitamin-minerals  1 tablet Oral Daily with breakfast    atorvastatin  40 mg Oral Nightly    clopidogrel  75 mg Oral Daily       Social History:     Social History     Socioeconomic History    Marital status: Unknown     Spouse name: Not on file    Number of children: Not on file    Years of education: Not on file    Highest education level: Not on file   Occupational History    Not on file   Social Needs    Financial resource strain: Not on file    Food insecurity:     Worry: Not on file     Inability: Not on file    Transportation needs:     Medical: Not on file     Non-medical: Not on file   Tobacco Use    Smoking status: Former Smoker   Substance and Sexual Activity    Alcohol use: Not on file    Drug use: Not on file    Sexual activity: Not on file   Lifestyle    Physical good turgor. Signs of peripheral arterial insufficiency. Rt lateral knee with open incision. Chest incision closed. Medical Decision Making -Laboratory:   I have independently reviewed/ordered the following labs:    CBC with Differential:   Recent Labs     04/22/19  0540 04/23/19 0431   WBC 16.5* 17.7*   HGB 8.9* 8.4*   HCT 29.0* 28.8*   * 539*     BMP:   Recent Labs     04/22/19  0540 04/22/19 2026 04/23/19  0431    136 135   K 3.0* 3.2* 3.4*    101 101   CO2 25 24 25   BUN 11  --  10   CREATININE 0.46*  --  0.45*   MG 1.9 2.0 2.0     Hepatic Function Panel:   No results for input(s): PROT, LABALBU, BILIDIR, IBILI, BILITOT, ALKPHOS, ALT, AST in the last 72 hours. No results for input(s): RPR in the last 72 hours. No results for input(s): HIV in the last 72 hours. No results for input(s): BC in the last 72 hours. Lab Results   Component Value Date    MUCUS NOT REPORTED 04/14/2019    RBC 2.86 04/23/2019    TRICHOMONAS NOT REPORTED 04/14/2019    WBC 17.7 04/23/2019    YEAST NOT REPORTED 04/14/2019    TURBIDITY CLOUDY 04/14/2019     Lab Results   Component Value Date    CREATININE 0.45 04/23/2019    GLUCOSE 136 04/23/2019       Medical Decision Making-Imaging:     EXAMINATION:   3 XRAY VIEWS OF THE RIGHT KNEE       4/7/2019 5:00 pm       COMPARISON:   04/07/2019       HISTORY:   ORDERING SYSTEM PROVIDED HISTORY: s/p Hardware removal at bedside   TECHNOLOGIST PROVIDED HISTORY:   s/p Hardware removal at bedside       FINDINGS:   Interval removal of anterior superior fixating screw.  No periprosthetic   fracture.  Chronic deformity of the proximal fibula.  Degenerative changes of   knee and tib-fib syndesmosis.            Impression   Removal of anterior-superior fixating screw without interval change otherwise     EXAMINATION:   3 XRAY VIEWS OF THE RIGHT KNEE       4/7/2019 3:28 pm       COMPARISON:   None.       HISTORY:   ORDERING SYSTEM PROVIDED HISTORY: Trauma/Fracture   TECHNOLOGIST PROVIDED HISTORY:   Trauma/Fracture       FINDINGS:   Sideplate and 4 screws transfix the proximal tibia laterally.  Old healed   fracture proximal fibular neck and head.  Spurring lateral tibia plateau. Proximal tibiofibular syndesmosis.  Visualized femur and patella normal.   Soft tissues unremarkable.           Impression   ORIF proximal tibia.  No acute fracture.         EXAMINATION:   SINGLE XRAY VIEW OF THE CHEST       4/10/2019 6:47 am       COMPARISON:   Chest radiograph performed 04/09/2019.       HISTORY:   ORDERING SYSTEM PROVIDED HISTORY: post op   TECHNOLOGIST PROVIDED HISTORY:   post op       FINDINGS:   There is redemonstration of bilateral pulmonary congestion/edema that is   similar compared to prior.  There are bibasilar effusions. Mohit Fret is no   pneumothorax.  The heart is prominent.  The upper abdomen is unremarkable. The extrathoracic soft tissues are unremarkable. Mohit Fret is an endotracheal   tube with the tip in the midtrachea.  There is a gastric tube with the tip in   the proximal aspect of the stomach.  The right internal jugular line has been   removed.  The Collins-Carly catheter has been removed.           Impression   Cardiomegaly and bilateral pulmonary congestion/edema that is similar   compared to prior.       Bibasilar effusions and stable endotracheal tube and gastric tube.             Medical Decision Making-Other: Thank you for allowing us to participate in the care of this patient. Please call with questions.     Eddy Lesches, MD

## 2019-04-23 NOTE — PROGRESS NOTES
[facility]    Progress Note    4/23/2019    6:57 PM    Name:   Rosangela Beck  MRN:     [id number]     [place]:      [de-identified]   Room:   [address] [place] [address] Day:  12  Admit Date:  4/7/2019  4:48 AM    PCP:   No primary care provider on file. Code Status:  Full Code    Subjective:     C/C:   Chief Complaint   Patient presents with    Cardiac Arrest     diff breather, brought in on cpap with elevations in EKG on EMS arrival.      Interval History Status:  PT in progress  Stronger  The patient is having trouble providing historical data   Somewhat confused     Database updates:  WBC 17. 7High k/uL RBC 2.86Low m/uL Hemoglobin 8.4Low g/dL Hematocrit 28.8Low     INR 1.3     Glucose 136High mg/dL BUN 10 mg/dL CREATININE 0.45Low     Potassium 4.1       Brief History:     As documented in the medical record: \"The patient is a 46 y.o. male who is admitted for  cardiac arrest and STEMI. Patient was admitted on 4/7/19 after he was brought in by EMS for dyspnea, acute respiratory failure. Patient was placed on CPAP and nebulization treatment. Initial EKG showed possible anterior septal STEMI. On arrival to [facility] emergency room patient had PEA arrest and was resuscitated per ACS protocol with 3 rounds of CPR and epinephrine with successful ROSC. Patient underwent emergent cardiac cath and showed multivessel CAD with severe LV dysfunction with EF 15% and severe hypokinesis. Patient had emergent CABG x 4 on 4/7/19 with closure of chest wall on 4/8/19. Allayne Fifi Patient developed acute kidney injury post-CAB with creatinine peaking at 1.99 which improved after fluid resuscitation and pressor support. Patient was taken off pressor support. Patient was asked. On 4/10/19. Patient had altered mental status upon weaning from  sedatives .   CT head showed scattered areas of encephalomalacia in bifrontal and left parietal areas consistent with remote infarct and possible subacute infarct in left lower parietal region. Patient has underlying history of hypertension with non compliance to treatment. Patient unable  To provide history as he is intubated . Has not been following up with providers for care . Patient also has wound in R knee and discharge . H/o Orthopedic surgery . Patient tolerating tube feeds \"     Past Medical History:   has a past medical history of CAD (coronary artery disease), Cardiac arrest ([facility] [address] Osteolysis, PEA (Pulseless electrical activity) ([facility] [address] and PVD (peripheral vascular disease) ([facility] [address] Social History:   reports that he has quit smoking. He does not have any smokeless tobacco history on file. Family History: History reviewed. No pertinent family history. Medications:      Allergies:  No Known Allergies    Current Meds:   Scheduled Meds:   furosemide  40 mg Oral BID    metoprolol tartrate  50 mg Oral BID    potassium chloride  40 mEq Oral BID WC    spironolactone  25 mg Oral Daily    insulin lispro  0-12 Units Subcutaneous TID WC    insulin lispro  0-6 Units Subcutaneous Nightly    insulin glargine  30 Units Subcutaneous Daily    lisinopril  5 mg Oral Daily    doxycycline hyclate  100 mg Oral 2 times per day    famotidine  20 mg Oral BID    enoxaparin  40 mg Subcutaneous Daily    nystatin  5 mL Oral 4x Daily    aspirin  81 mg Oral Daily    docusate  100 mg Oral Daily    dextrose  12.5 g Intravenous Once    albuterol  2.5 mg Nebulization Q6H    sodium chloride flush  10 mL Intravenous 2 times per day    sodium chloride flush  10 mL Intravenous 2 times per day    polyethylene glycol  17 g Oral Daily    chlorhexidine  15 mL Mouth/Throat BID    therapeutic multivitamin-minerals  1 tablet Oral Daily with breakfast    atorvastatin  40 mg Oral Nightly    clopidogrel  75 mg Oral Daily     Continuous Infusions:    dextrose 5 % and 0.45 % NaCl 75 mL/hr at 04/22/19 1750    sodium chloride      niCARdipine      dextrose PRN Meds: potassium chloride **OR** potassium alternative oral replacement **OR** potassium chloride, potassium chloride, potassium chloride, magnesium hydroxide, fentanNYL, sodium chloride flush, labetalol, albuterol, sodium chloride flush, sodium chloride flush, calcium chloride IVPB, magnesium sulfate, potassium chloride, acetaminophen, acetaminophen, oxyCODONE-acetaminophen **OR** oxyCODONE-acetaminophen, diphenhydrAMINE, bisacodyl, ondansetron, hydrALAZINE, albumin human, glucose, dextrose, glucagon (rDNA), dextrose      Review of Systems:     Review of Systems   Respiratory: Negative for cough and shortness of breath. Cardiovascular: Positive for chest pain (his incisional pain is controlled). Negative for palpitations. Gastrointestinal: Negative for abdominal pain, nausea and vomiting. Genitourinary: Negative for difficulty urinating and hematuria. Psychiatric/Behavioral: Positive for confusion. Physical Examination:        Vitals:  BP (!) 112/50   Pulse 86   Temp 99.1 °F (37.3 °C) (Oral)   Resp 16   Ht 5' 8\" (1.727 m)   Wt 161 lb 6 oz (73.2 kg)   SpO2 97%   BMI 24.54 kg/m²   Temp (24hrs), Av.2 °F (36.8 °C), Min:97.5 °F (36.4 °C), Max:99.1 °F (37.3 °C)    Recent Labs     19  1146 19  1448 19  2028 19  0715   POCGLU 147* 117* 203* 168*       Physical Exam   Constitutional: No distress. HENT:   Head: Normocephalic. Nose: Nose normal.   Eyes: Conjunctivae are normal. No scleral icterus. Neck: Neck supple. No tracheal deviation present. Cardiovascular: Normal rate and regular rhythm. Pulmonary/Chest: Effort normal and breath sounds normal. No respiratory distress. He has no wheezes. He has no rales. He exhibits no tenderness. Abdominal: Soft. Bowel sounds are normal. He exhibits no distension. There is no tenderness. Musculoskeletal: He exhibits no edema or tenderness.    Neurological:   Having trouble with historical data   Skin: Skin is warm and dry. He is not diaphoretic. Wounds clean and dry   Vitals reviewed. Data:     I/O (24Hr): Intake/Output Summary (Last 24 hours) at 4/23/2019 1857  Last data filed at 4/23/2019 1723  Gross per 24 hour   Intake 2044.58 ml   Output 2015 ml   Net 29.58 ml       Labs:    Hematology:  Recent Labs     04/21/19  0502 04/22/19  0540 04/23/19 0431   WBC 18.8* 16.5* 17.7*   HGB 8.7* 8.9* 8.4*   HCT 29.1* 29.0* 28.8*   * 530* 539*   INR 1.4 1.3 1.3     Chemistry:  Recent Labs     04/21/19  0502 04/22/19  0540 04/22/19 2026 04/23/19  0431 04/23/19  1152    142 136 135  --    K 3.9 3.0* 3.2* 3.4* 4.1    107 101 101  --    CO2 25 25 24 25  --    GLUCOSE 110* 132*  --  136*  --    BUN 16 11  --  10  --    CREATININE 0.44* 0.46*  --  0.45*  --    MG 1.9 1.9 2.0 2.0  --    CALCIUM 7.8* 7.9*  --  7.8*  --      No results for input(s): PROT, LABALBU, LABA1C, O7MOMRV, D8XZUYF, FT4, TSH, AST, ALT, LDH, GGT, ALKPHOS, BILITOT, BILIDIR, AMMONIA, AMYLASE, LIPASE, LACTATE, CHOL, TRIG, HDL, LDLCALC, LDLDIRECT, LABVLDL, BNP, TROPONINI, CKTOTAL, CKMB, CKMBINDEX in the last 72 hours.     Lab Results   Component Value Date/Time    SPECIAL NOT REPORTED 04/22/2019 04:08 PM     Lab Results   Component Value Date/Time    CULTURE NO GROWTH 20 HOURS 04/22/2019 04:08 PM       Lab Results   Component Value Date    POCPH 7.451 04/19/2019    POCPCO2 44.1 04/19/2019    POCPO2 72.4 04/19/2019    POCHCO3 30.8 04/19/2019    NBEA NOT REPORTED 04/19/2019    PBEA 6 04/19/2019    DBM9MXK 32 04/19/2019    GGYT0GKW 95 04/19/2019    FIO2 40.0 04/19/2019       Radiology / Diagnostics:  See above      Assessment:        Primary Problem  Principal Problem:    Cardiac arrest [facility])  Active Problems:    Acute ST elevation myocardial infarction (STEMI) of anterior wall (HCC)    Severe left ventricular systolic dysfunction    Acute respiratory failure with hypoxemia (HCC)    Cardiogenic pulmonary edema (HCC)    PAD (peripheral artery disease) ([facility] [address]    Subacute osteomyelitis of right tibia ([facility] [address]    Exposed orthopaedic hardware ([facility] [address]    Metabolic encephalopathy    Abnormal CT of the head    Fever chills    Hepatitis    Acute cerebral infarction associated with systemic hypoxia or ischemia    Multi infarct state    Carotid stenosis, bilateral    Hypokalemia    Hyperglycemia  Resolved Problems:    * No resolved hospital problems.  *      Plan         Optimize cardio-pulmonary function  Cardiology evaluation  Pulmonary evaluation  Cardiothoracic surgical evaluation  Antibiotics per C&S / Infectious Disease  Check bun and creatinine / renal evaluation  Neurology evaluation  Orthopedic evaluation - hardware removed right leg / tibial fracture  Wound care  Carotid angiogram and possible right ICA stent  Diet as tolerated  Seroquel  Anemia - blood products as needed  Correct electrolyte abnormalities - potassium supplementation  Physical therapy and rehabilitation      IP CONSULT TO CRITICAL CARE  IP CONSULT TO [address] [place] [address] [place] CONSULT TO CARDIOLOGY  IP CONSULT TO CARDIAC REHAB  IP CONSULT TO CASE MANAGEMENT  IP CONSULT TO DIETITIAN  IP CONSULT TO SPIRITUAL SERVICES  IP CONSULT TO ORTHOPEDIC SURGERY  IP CONSULT TO INFECTIOUS DISEASES  IP CONSULT TO DIETITIAN  IP CONSULT TO IV TEAM  IP CONSULT TO NEUROLOGY  IP CONSULT TO NEPHROLOGY  IP CONSULT TO INTERNAL MEDICINE  IP CONSULT TO IV TEAM  IP CONSULT TO ENDOVASCULAR NEUROSURGERY  PALLIATIVE CARE EVAL  IP CONSULT TO IV TEAM  IP CONSULT TO PHYSICAL MEDICINE REHAB  INPATIENT CONSULT TO ORTHOTIST/PROSTHETIST  IP CONSULT TO IV TEAM    Shavonne South DO    4/23/2019    6:57 PM

## 2019-04-23 NOTE — PROGRESS NOTES
Progress Note    Patient:  Karlie Meza  YOB: 1965     46 y.o. male    Subjective:  Patient seen and examined. No acute issues overnight. No complaints or concerns. Pain is controlled. Patient denies F, HA, CP, SOB, N/V. Numbness and tingling to the right leg and foot. Afebrile, VSS     Objective:   Vitals:    04/23/19 0322   BP: 107/63   Pulse: 81   Resp: 16   Temp: 98.1 °F (36.7 °C)   SpO2: 95%     Gen: NAD, cooperative    RLE: Dressing over knee CDI. Compartments soft. 2+ DP pulse. 0/5 TA/EHL/FHL/GS. Diffuse dysesthesias in the Deep and Superficial Peroneal/Saphenous/Sural nerve distributions. Toes warm and well perfused. Able to flex and extend at knee. Recent Labs     04/22/19  0540 04/22/19 2026   WBC 16.5*  --    HGB 8.9*  --    HCT 29.0*  --    *  --    INR 1.3  --     136   K 3.0* 3.2*   BUN 11  --    CREATININE 0.46*  --    GLUCOSE 132*  --         Meds: ASA, Plavix, Lovenox, ancef  See rec for complete list    Impression/plan: 46 y.o. male R tibia hardware infection, s/p bedside screw removal on 4/7, irrigation and debridement with hardware removal 4/22    - F/u intraoperative cultures. - Complete post op abx  - WBAT  - Patient will need AFO/PRAFO for RLE foot drop. - Pain control and medical management per primary  - DVT ppx: EPC, Lovenox, Plavix. Management per primary  - Ice (20 minutes on and off 1 hour) and elevate (above heart) as needed for swelling/pain  - PT/OT  - Please page DO ortho with any questions    Aggie Sheets,   PGY-1, Department of Damon Baker [address]859, [facility], [facility]  5:05 AM 4/23/2019          PGY-2 Addendum  Patient seen and examined. Agree with above. Per nursing confused overnight requiring a bedside sitter. Responding to questioning. Pain controlled. Notes inability to move ankle or toes      Physical exam as noted above. No motor present in ankle through toes.  Able to flex and extend at knee. Diffuse dysesthesias throughout foot.       Assessment/Plan:  -Plan for dressing change tomorrow to RLE  -WBAT RLE  -AFO ordered for RLE foot drop  -Plan as otherwise noted above      ------------------------------------------------------------------------------    Cecilie Bernheim DO, PGY-2  Orthopedic Surgery Resident  [facility]

## 2019-04-23 NOTE — PLAN OF CARE
Patient not attempting to ambulate per self. Telesitter and bedside guard in use. Patient utilizing call light appropriately. Remains free of falls or injuries. Will continue to monitor.

## 2019-04-23 NOTE — PROGRESS NOTES
Speech Language Pathology  Speech Language Pathology  [facility]    Cognitive Treatment Note    Date: 4/23/2019  Patients Name: Rosangela Beck  MRN: [id number]  Diagnosis:   Patient Active Problem List   Diagnosis Code    Cardiac arrest [facility]) I46.9    Acute ST elevation myocardial infarction (STEMI) of anterior wall ([facility]) I21.09    Severe left ventricular systolic dysfunction T67.8    Acute respiratory failure with hypoxemia ([facility]) J96.01    Cardiogenic pulmonary edema ([facility]) I50.1    PAD (peripheral artery disease) ([facility]) I73.9    Subacute osteomyelitis of right tibia ([facility] [address] M86.261    Exposed orthopaedic hardware ([facility] [address] W79.562D    Metabolic encephalopathy J78.72    Abnormal CT of the head R93.0    Fever chills R50.9    Hepatitis K75.9    Acute cerebral infarction associated with systemic hypoxia or ischemia I63.89    Multi infarct state I69.30    Carotid stenosis, bilateral I65.23    Hypokalemia E87.6    Hyperglycemia R73.9       Pain: 0/10    Cognitive Treatment    Treatment time: 10:23-10:50      Subjective: [x] Alert [x] Cooperative     [] Confused     [] Agitated    [] Lethargic      Objective/Assessment:    Recall: Delayed: 0/3 increased to 3/3 with mod verbal cues, 2/3 increased to 3/3 with min verbal cues. Problem Solving/Reasoning: Similarities/Differences: 8/10 increased to 10/10 with min verbal cues. Multiple Uses for Objects: 7/10 increased to 10/10 with min verbal cues. Word Deductions: 5/10 increased to 10/10 with min-mod verbal cues. Opposites: 7/10 increased to 10/10 with min verbal cues. Further therapy recommended at discharge. The patient should be able to tolerate at least three hours of therapy per day over 5 days or 15 hours over 7 days.        Plan:  [x] Continue  services    [] Discharge from :      Discharge recommendations: [] Inpatient Rehab   [] [place]   [] Outpatient Therapy  [] Follow up at trauma clinic   [x] Other: therapy      Completed by Lorne Trevino,  Clinician  Co-signed by Goldie Gilbert. ROSS/SLP

## 2019-04-23 NOTE — PROGRESS NOTES
Spoke with Daren Hernandez CM, who states pt's family members, including his son, dad & stepmom, and sister, are supportive, visiting often, and are able to provide support upon d/c.  Lalita Rabago also states pt is having carotid stents on Thursday and should be ready for ARU on Friday. ARU will follow. Writer requested therapies continue to work with pt.

## 2019-04-23 NOTE — PLAN OF CARE
PRN FOR BRONCHOSPASM/BRONCHOCONSTRICTION     ? IMPROVE AERATION/BREATH SOUNDS  ? ADMINISTER BRONCHODILATOR THERAPY AS APPROPRIATE  ? ASSESS BREATH SOUNDS  ? IMPLEMENT AEROSOL/MDI PROTOCOL  ? PATIENT EDUCATION AS NEEDED    PROVIDE ADEQUATE OXYGENATION WITH ACCEPTABLE SP02/ABG'S    ? IDENTIFY APPROPRIATE OXYGEN THERAPY  ? MONITOR SP02/ABG'S AS NEEDED   ?    PATIENT EDUCATION AS NEEDED

## 2019-04-24 LAB
ANION GAP SERPL CALCULATED.3IONS-SCNC: 12 MMOL/L (ref 9–17)
BUN BLDV-MCNC: 9 MG/DL (ref 6–20)
BUN/CREAT BLD: ABNORMAL (ref 9–20)
CALCIUM SERPL-MCNC: 8.5 MG/DL (ref 8.6–10.4)
CHLORIDE BLD-SCNC: 105 MMOL/L (ref 98–107)
CO2: 25 MMOL/L (ref 20–31)
CREAT SERPL-MCNC: 0.43 MG/DL (ref 0.7–1.2)
GFR AFRICAN AMERICAN: >60 ML/MIN
GFR NON-AFRICAN AMERICAN: >60 ML/MIN
GFR SERPL CREATININE-BSD FRML MDRD: ABNORMAL ML/MIN/{1.73_M2}
GFR SERPL CREATININE-BSD FRML MDRD: ABNORMAL ML/MIN/{1.73_M2}
GLUCOSE BLD-MCNC: 100 MG/DL (ref 75–110)
GLUCOSE BLD-MCNC: 158 MG/DL (ref 75–110)
GLUCOSE BLD-MCNC: 226 MG/DL (ref 75–110)
GLUCOSE BLD-MCNC: 261 MG/DL (ref 75–110)
GLUCOSE BLD-MCNC: 92 MG/DL (ref 70–99)
GLUCOSE BLD-MCNC: 93 MG/DL (ref 75–110)
HCT VFR BLD CALC: 30.4 % (ref 40.7–50.3)
HEMOGLOBIN: 9.1 G/DL (ref 13–17)
INR BLD: 1.4
MAGNESIUM: 1.9 MG/DL (ref 1.6–2.6)
MCH RBC QN AUTO: 29.3 PG (ref 25.2–33.5)
MCHC RBC AUTO-ENTMCNC: 29.9 G/DL (ref 28.4–34.8)
MCV RBC AUTO: 97.7 FL (ref 82.6–102.9)
NRBC AUTOMATED: 0 PER 100 WBC
PDW BLD-RTO: 13.7 % (ref 11.8–14.4)
PLATELET # BLD: 620 K/UL (ref 138–453)
PLATELET # BLD: 667 K/UL (ref 138–453)
PMV BLD AUTO: 10.9 FL (ref 8.1–13.5)
POTASSIUM SERPL-SCNC: 3.8 MMOL/L (ref 3.7–5.3)
PROTHROMBIN TIME: 14 SEC (ref 9–12)
RBC # BLD: 3.11 M/UL (ref 4.21–5.77)
SODIUM BLD-SCNC: 142 MMOL/L (ref 135–144)
WBC # BLD: 18 K/UL (ref 3.5–11.3)

## 2019-04-24 PROCEDURE — 6370000000 HC RX 637 (ALT 250 FOR IP): Performed by: STUDENT IN AN ORGANIZED HEALTH CARE EDUCATION/TRAINING PROGRAM

## 2019-04-24 PROCEDURE — 6370000000 HC RX 637 (ALT 250 FOR IP): Performed by: PHYSICIAN ASSISTANT

## 2019-04-24 PROCEDURE — 85049 AUTOMATED PLATELET COUNT: CPT

## 2019-04-24 PROCEDURE — 94640 AIRWAY INHALATION TREATMENT: CPT

## 2019-04-24 PROCEDURE — 2580000003 HC RX 258: Performed by: STUDENT IN AN ORGANIZED HEALTH CARE EDUCATION/TRAINING PROGRAM

## 2019-04-24 PROCEDURE — 99232 SBSQ HOSP IP/OBS MODERATE 35: CPT | Performed by: INTERNAL MEDICINE

## 2019-04-24 PROCEDURE — 97127 HC SP THER IVNTJ W/FOCUS COG FUNCJ: CPT

## 2019-04-24 PROCEDURE — 76937 US GUIDE VASCULAR ACCESS: CPT

## 2019-04-24 PROCEDURE — 2700000000 HC OXYGEN THERAPY PER DAY

## 2019-04-24 PROCEDURE — 2140000001 HC CVICU INTERMEDIATE R&B

## 2019-04-24 PROCEDURE — 85610 PROTHROMBIN TIME: CPT

## 2019-04-24 PROCEDURE — APPSS15 APP SPLIT SHARED TIME 0-15 MINUTES: Performed by: NURSE PRACTITIONER

## 2019-04-24 PROCEDURE — 97110 THERAPEUTIC EXERCISES: CPT

## 2019-04-24 PROCEDURE — 83735 ASSAY OF MAGNESIUM: CPT

## 2019-04-24 PROCEDURE — 97530 THERAPEUTIC ACTIVITIES: CPT

## 2019-04-24 PROCEDURE — 85027 COMPLETE CBC AUTOMATED: CPT

## 2019-04-24 PROCEDURE — 6360000002 HC RX W HCPCS: Performed by: STUDENT IN AN ORGANIZED HEALTH CARE EDUCATION/TRAINING PROGRAM

## 2019-04-24 PROCEDURE — 99233 SBSQ HOSP IP/OBS HIGH 50: CPT | Performed by: INTERNAL MEDICINE

## 2019-04-24 PROCEDURE — 97116 GAIT TRAINING THERAPY: CPT

## 2019-04-24 PROCEDURE — 94762 N-INVAS EAR/PLS OXIMTRY CONT: CPT

## 2019-04-24 PROCEDURE — 36415 COLL VENOUS BLD VENIPUNCTURE: CPT

## 2019-04-24 PROCEDURE — 80048 BASIC METABOLIC PNL TOTAL CA: CPT

## 2019-04-24 RX ORDER — QUETIAPINE FUMARATE 25 MG/1
50 TABLET, FILM COATED ORAL NIGHTLY
Status: DISCONTINUED | OUTPATIENT
Start: 2019-04-24 | End: 2019-04-25 | Stop reason: HOSPADM

## 2019-04-24 RX ADMIN — METOPROLOL TARTRATE 50 MG: 25 TABLET ORAL at 22:23

## 2019-04-24 RX ADMIN — INSULIN LISPRO 4 UNITS: 100 INJECTION, SOLUTION INTRAVENOUS; SUBCUTANEOUS at 13:34

## 2019-04-24 RX ADMIN — DESMOPRESSIN ACETATE 40 MG: 0.2 TABLET ORAL at 22:23

## 2019-04-24 RX ADMIN — ASPIRIN 81 MG 81 MG: 81 TABLET ORAL at 09:30

## 2019-04-24 RX ADMIN — DIPHENHYDRAMINE HCL 25 MG: 25 TABLET ORAL at 22:31

## 2019-04-24 RX ADMIN — Medication 500000 UNITS: at 13:34

## 2019-04-24 RX ADMIN — LISINOPRIL 5 MG: 5 TABLET ORAL at 09:29

## 2019-04-24 RX ADMIN — FUROSEMIDE 40 MG: 40 TABLET ORAL at 09:29

## 2019-04-24 RX ADMIN — ALBUTEROL SULFATE 2.5 MG: 2.5 SOLUTION RESPIRATORY (INHALATION) at 20:42

## 2019-04-24 RX ADMIN — Medication 15 ML: at 22:31

## 2019-04-24 RX ADMIN — FAMOTIDINE 20 MG: 20 TABLET, FILM COATED ORAL at 22:24

## 2019-04-24 RX ADMIN — SPIRONOLACTONE 25 MG: 25 TABLET ORAL at 09:29

## 2019-04-24 RX ADMIN — MULTIPLE VITAMINS W/ MINERALS TAB 1 TABLET: TAB at 09:28

## 2019-04-24 RX ADMIN — Medication 10 ML: at 09:30

## 2019-04-24 RX ADMIN — DOXYCYCLINE HYCLATE 100 MG: 100 TABLET, COATED ORAL at 09:28

## 2019-04-24 RX ADMIN — INSULIN LISPRO 6 UNITS: 100 INJECTION, SOLUTION INTRAVENOUS; SUBCUTANEOUS at 17:43

## 2019-04-24 RX ADMIN — Medication 500000 UNITS: at 09:28

## 2019-04-24 RX ADMIN — CLOPIDOGREL 75 MG: 75 TABLET, FILM COATED ORAL at 09:29

## 2019-04-24 RX ADMIN — DOXYCYCLINE HYCLATE 100 MG: 100 TABLET, COATED ORAL at 22:25

## 2019-04-24 RX ADMIN — POTASSIUM CHLORIDE 40 MEQ: 1500 TABLET, EXTENDED RELEASE ORAL at 09:29

## 2019-04-24 RX ADMIN — FUROSEMIDE 40 MG: 40 TABLET ORAL at 17:43

## 2019-04-24 RX ADMIN — FAMOTIDINE 20 MG: 20 TABLET, FILM COATED ORAL at 09:28

## 2019-04-24 RX ADMIN — QUETIAPINE FUMARATE 50 MG: 25 TABLET ORAL at 22:26

## 2019-04-24 RX ADMIN — INSULIN LISPRO 3 UNITS: 100 INJECTION, SOLUTION INTRAVENOUS; SUBCUTANEOUS at 23:26

## 2019-04-24 RX ADMIN — ALBUTEROL SULFATE 2.5 MG: 2.5 SOLUTION RESPIRATORY (INHALATION) at 13:04

## 2019-04-24 RX ADMIN — ALBUTEROL SULFATE 2.5 MG: 2.5 SOLUTION RESPIRATORY (INHALATION) at 02:49

## 2019-04-24 RX ADMIN — ENOXAPARIN SODIUM 40 MG: 40 INJECTION SUBCUTANEOUS at 09:29

## 2019-04-24 RX ADMIN — Medication 10 ML: at 22:26

## 2019-04-24 RX ADMIN — POTASSIUM CHLORIDE 40 MEQ: 1500 TABLET, EXTENDED RELEASE ORAL at 18:07

## 2019-04-24 RX ADMIN — Medication 10 ML: at 22:25

## 2019-04-24 RX ADMIN — Medication 500000 UNITS: at 17:43

## 2019-04-24 RX ADMIN — INSULIN GLARGINE 30 UNITS: 100 INJECTION, SOLUTION SUBCUTANEOUS at 09:40

## 2019-04-24 RX ADMIN — Medication 500000 UNITS: at 22:24

## 2019-04-24 RX ADMIN — METOPROLOL TARTRATE 50 MG: 25 TABLET ORAL at 09:28

## 2019-04-24 RX ADMIN — ALBUTEROL SULFATE 2.5 MG: 2.5 SOLUTION RESPIRATORY (INHALATION) at 08:00

## 2019-04-24 ASSESSMENT — ENCOUNTER SYMPTOMS
VOMITING: 0
SHORTNESS OF BREATH: 0
COUGH: 0
ABDOMINAL PAIN: 0
NAUSEA: 0

## 2019-04-24 NOTE — PROGRESS NOTES
Facility   [] Outpatient Therapy  [] Follow up at trauma clinic   [x] Other: therapy      Treatment completed by: Jeffery Norwood, Graduate Clinician  Co-signed by Sydney Parrish MS, CCC/SLP

## 2019-04-24 NOTE — PROGRESS NOTES
[facility]   Progress Note                   Date:   4/24/2019  Patient name: Jonny Mullen  Date of admission:  4/7/2019  4:48 AM  MRN:   [id number]  YOB: 1965  PCP: No primary care provider on file. Reason for Admission: STEMI    Subjective:       S/P emergent CABG, POD#17    PEA Arrest, late presentation of STEMI with emergent bypass. Post op course complicated by neurological insult with multiple areas of ischemic stroke    Pt is alert and oriented to place and time  SBP is stable  HR in 80 to 90      I/O last 3 completed shifts: In: 7216 [P.O.:1100;  I.V.:542]  Out: 1750 [Urine:1750]  I/O this shift:  In: -   Out: 1050 [Urine:1050]      Medications:   Scheduled Meds:   furosemide  40 mg Oral BID    metoprolol tartrate  50 mg Oral BID    potassium chloride  40 mEq Oral BID WC    spironolactone  25 mg Oral Daily    insulin lispro  0-12 Units Subcutaneous TID WC    insulin lispro  0-6 Units Subcutaneous Nightly    insulin glargine  30 Units Subcutaneous Daily    lisinopril  5 mg Oral Daily    doxycycline hyclate  100 mg Oral 2 times per day    famotidine  20 mg Oral BID    enoxaparin  40 mg Subcutaneous Daily    nystatin  5 mL Oral 4x Daily    aspirin  81 mg Oral Daily    docusate  100 mg Oral Daily    dextrose  12.5 g Intravenous Once    albuterol  2.5 mg Nebulization Q6H    sodium chloride flush  10 mL Intravenous 2 times per day    sodium chloride flush  10 mL Intravenous 2 times per day    polyethylene glycol  17 g Oral Daily    chlorhexidine  15 mL Mouth/Throat BID    therapeutic multivitamin-minerals  1 tablet Oral Daily with breakfast    atorvastatin  40 mg Oral Nightly    clopidogrel  75 mg Oral Daily       Continuous Infusions:   dextrose 5 % and 0.45 % NaCl 75 mL/hr at 04/22/19 1750    sodium chloride      niCARdipine      dextrose         CBC:   Recent Labs     04/22/19  0540 04/23/19  0431 04/24/19  0421 04/24/19  1144   WBC 16.5* 17.7* 18.0* --    HGB 8.9* 8.4* 9.1*  --    * 539* 620* 667*     BMP:    Recent Labs     04/22/19  0540 04/22/19  2026 04/23/19  0431 04/23/19  1152 04/24/19  0421    136 135  --  142   K 3.0* 3.2* 3.4* 4.1 3.8    101 101  --  105   CO2 25 24 25  --  25   BUN 11  --  10  --  9   CREATININE 0.46*  --  0.45*  --  0.43*   GLUCOSE 132*  --  136*  --  92     INR:   Recent Labs     04/22/19  0540 04/23/19  0431 04/24/19 0421   INR 1.3 1.3 1.4       Objective:   Vitals: BP (!) 160/59   Pulse 79   Temp 97.5 °F (36.4 °C) (Oral)   Resp 16   Ht 5' 8\" (1.727 m)   Wt 163 lb 12.8 oz (74.3 kg)   SpO2 97%   BMI 24.91 kg/m²   General appearance: alert and oriented  HEENT: Head: Normocephalic, no lesions,   Bilateral bruist   Neck: no JVD  Lungs:  rales present  Heart: regular rate and rhythm, S1, S2 normal, no murmur, click, rub or gallop  Abdomen: soft, non-tender; bowel sounds normal  Extremities: mild leg edema      EKG: Sinus tachycardia with anteroseptal infarct      Coronary Angiography: 4/7/19  LMCA: has distal 60% stenosis. LAD: has ostial 90% stenosis and long proximal 80% stenosis. D1 has proximal 70% stenosis. LCx: has 20% stenosis. OM1 is very small. OM2 has proximal 90% stenosis. RCA: has proximal 50% stenosis, mid 60% stenosis and distal 80% stenosis. Ramus: has ostial 99% stenosis.     The LV gram was performed in the DTUTON 30 position. LVEF: 15%. LV Wall Motion: severe hypokinesis of the anterior wall and mid inferior wall with apical akinesis. Echo 4/12/19:  Left ventricle is normal in size. Global left ventricular systolic function is mildly reduced. Calculated ejection fraction is 40. Left ventricular wall thickness is mildly increased. Grade II diastolic dysfunction  Normal valvular function      Assessment:   1. PEA Arrest (Multiple times) with ROSC in few minutes - S/P extubation( 4/19)  2. Late presentation of anteroseptal STEMI   3.  Multivessel CAD s/p emergent CABG on 4/7 with LIMA-LAD, SVG-PDA,OM 1 & distal circumflex). 4. S/P sternal closure on 4/8  5. Ischemic cardiomyopathy with acute systolic CHF (EF 08% on ANGELA and TTE)  6. NSVT  7. Post op anemia stable  8. Post op thrombocytopenia resolved  9. Acute pulmonary edema s/p extubation( 4/19)  10. Sepsis resolved  11. Acute renal failure - resolved  12. Metabolic encephalopathy resolving  13. Exposed orthopedic hardware S/P removal of Protruding orthopedic nail from Rt lateral knee   14. S/P ORIF Rt proximal tibia  15. Shock Liver with AST and ALT in 3000s (AST 3077, ALT 3818) resolved  16. Gallbladder sludge with concern for cholecystitis  17. Multiple embolic acute infarcts in brain( 4/14)  18. Left internal carotid artery occlusion  19. ICU Delirium resolving  20    Dysphagia with dental soft diet    Treatment Plan:   1 S/P extubation( 4/19). Po lasix 40 BID. lopressor  50 BID. Lisinopril 5 mg. Aldactone 25 mg for GDMT. Hold the anti HTN if SBP<110 as pt had recent CVA. 2 continue with rest of medications for the patient including asa, plavix and lipitor. 3 Seroquel HS for the ICU delirium     4 patient will be intermediate risk not prohibitive for the carotid angiography scheduled by neurology team.    Electronically signed on 04/24/19 at 1:36 PM by:  Fellow cardiology    Attending Physician Statement  I have discussed the case of Tawana Rondon including pertinent history and exam findings with the resident. I have seen and examined the patient and the key elements of the encounter have been performed by me. I agree with the assessment, plan and orders as documented by the resident With changes made to the note.      Electronically signed by Shadi Hu MD on 4/24/2019 at 1:58 PM.    [facility]      [phone]

## 2019-04-24 NOTE — PROGRESS NOTES
Physical Therapy  Facility/Department: [facility] CAR 1  Daily Treatment Note  NAME: Karrie Zapata  : 1965  MRN: [id number]    Date of Service: 2019    Discharge Recommendations: Further therapy recommended at discharge and the patient should be able to tolerate at least 3 hours per day over 5 days or 15 hours over 7 days. Patient Diagnosis(es): The primary encounter diagnosis was Acute ST elevation myocardial infarction (STEMI) of anterior wall ([facility] [address] A diagnosis of Cardiac arrest [facility]) was also pertinent to this visit. has a past medical history of CAD (coronary artery disease), Cardiac arrest ([facility] [address] Osteolysis, PEA (Pulseless electrical activity) ([facility] [address] and PVD (peripheral vascular disease) ([facility] has a past surgical history that includes Cardiac catheterization (2019); Femur Surgery; Tonsillectomy; Coronary artery bypass graft (2019); Hardware Removal (Right, 2019); Tibia fracture surgery (Right); Coronary artery bypass graft (N/A, 2019); RECONSTRUCTIVE REPAIR STERNAL (N/A, 2019); Hardware Removal (Right, 2019); and REMOVE HARDWARE FEMUR (N/A, 2019). Restrictions  Restrictions/Precautions  Restrictions/Precautions: Cardiac, General Precautions, Surgical Protocols, Fall Risk  Required Braces or Orthoses?: No  Position Activity Restriction  Sternal Precautions: No Pushing, No Pulling, 5# Lifting Restrictions  Sternal Precautions: Emergent CABG X4  Other position/activity restrictions: Fall Risk  Subjective    Pt in bed upon arrival. Pt and RN agreeable for therapy this morning. Pt needs an AFO for his R foot. Pt has telesitter in room. Pt had no c/o pain. Orientation  Overall Orientation Status: Within Functional Limits    Objective   Transfers  Sit to Stand: Moderate Assistance  Stand to sit: Min Assistance  Comment: Pt has a retro lean with standing. VC's given for proper hand placement for safe transfers, with good return from pt.   Ambulation  Ambulation?: Yes  Ambulation 1  Surface: level tile  Device: Rolling Walker  Other apparatus: 2 L O2, chair to follow  Assistance: Moderate assistance  Quality of Gait: WBAT Right LE, Flexed posture,AFO on R foot,step too gait with L foot. Pt demonstrates weak Dorsiflexion on L foot. Pt's L foot inverts. Distance: 125ft, 1 seated rest break, 100 ft  Comments: Posterior LOB x2,Chair to follow. Pt required 1 seated rest break d/t fatigue. Exercises  Seated LE exercise program: Long Arc Quads, isometric hip abduction/adduction, heel/toe raises, and marches. Reps: 20 x each with 2 lb wt on B LE's. Comment: Verbal and tactile cues for ex's on his R LE. Pt had a good return with cues. Assessment   Assessment: Pt ambulated 125ft with 1 seated rest breaks, then 100ft Mod A with use of RW. Pt took 1 seated rest breaks d/t fatigue and weakness in LE's. Pt required VC's for hand placement with transfers with good return. Treatment Diagnosis: general weakness; difficulty walking  Prognosis: Good  Patient Education: Mobility and getting out of bed to chair. Activity Tolerance  Activity Tolerance: Patient Tolerated treatment well  Activity Tolerance: not able to bear wt on right enough to advance left LE. Fatigue with exercises        Goals  Short term goals  Time Frame for Short term goals: 14 visits  Short term goal 1: Pt to transfer sit to stand with min A + 1. Short term goal 2: Pt to ambulate with least restrictive AD 40ft with no LOB. Short term goal 3: Pt to demonstrate good sitting balance. Short term goal 4: Pt to tolerate 20-30 mins ther ex/act for improved strength and endurance for ADL's.    Patient Goals   Patient goals : none stated    Plan    Plan  Times per week:  BID  Times per day: Twice a day  Current Treatment Recommendations: ROM, Strengthening, Functional Mobility Training, Endurance Training, Transfer Training, Gait Training, Stair training, Balance Training, Neuromuscular Re-education, Home Exercise Program, Safety Education & Training  Safety Devices  Type of devices: All fall risk precautions in place, Call light within reach, Nurse notified, Gait belt, Chair alarm in place, Left in chair, Patient at risk for falls  Restraints  Initially in place: No  Restraints:  chair alarm     Therapy Time   Individual Concurrent Group Co-treatment   Time In   800/955         Time Out   835/1005         Minutes  35/10                 VEE Sharpe  Treatment performed by Student PTA under the supervision of co-signing PTA who agrees with all treatment and documentation.    Keyona De La Cruz, PTA

## 2019-04-24 NOTE — PROGRESS NOTES
Pulmonary Progress Note    CC:  Dyspnea  Subjective:  Improving cardiac status  Slept well  No fever. No DVt  Gas exchange is good  Xray chest reviewed. Pulm. Congestion is improving. Review of Systems -  General ROS: negative for - chills, fatigue, fever or weight loss  ENT ROS: negative for - headaches, oral lesions or sore throat  Cardiovascular ROS: no chest pain , orthopnea or pnd   Gastrointestinal ROS: no abdominal pain, change in bowel habits, or black or bloody stools  Skin - no rash   Neuro - no blurry vision , no loc . No focal weakness   msk - no jt tenderness or swelling    Vascular - no claudication , rest completed and negative   Lymphatic - complete and negative   Hematology - oncology - complete and negative   Allergy immunology - complete and negative    no burning or hematuria         LUNG CANCER SCREENING     1. CRITERIA MET    []     CT ORDERED  []      2. CRITERIA NOT MET   [x]      3. REFUSED                    []        REASON CRITERIA NOT MET     1. SMOKING LESS THAN 30 PY  []      2. AGE LESS THAN 55 or GREATER 77 YEARS  []      3. QUIT SMOKING 15 YEARS OR GREATER   [x]      4. RECENT CT WITH IN 11 MONTHS    []      5. LIFE EXPECTANCY < 5 YEARS   []      6. SIGNS  AND SYMPTOMS OF LUNG CANCER   []           Immunization     There is no immunization history on file for this patient. Pneumococcal Vaccine     [x] Up to date    [] Indicated   [] Refused  [] Contraindicated       Influenza Vaccine   [x] Up to date    [] Indicated   [] Refused  [] Contraindicated     PAST MEDICAL HISTORY:       Diagnosis Date    CAD (coronary artery disease) 04/07/2019    Cardiac arrest ([facility] [address] 04/07/2019    Osteolysis 04/07/2019    R Knee hardware    PEA (Pulseless electrical activity) ([facility] [facility] 04/07/2019    [facility]ipheral [facility] 04/07/2019         Family History:   History reviewed. No pertinent family history.     SURGICAL HISTORY:   Past Surgical History:   Procedure Laterality Date    CARDIAC CATHETERIZATION  04/07/2019    MVD-Stv    CORONARY ARTERY BYPASS GRAFT  04/07/2019    x4 per Dr. Houston Baldwin, [facility] GRAFT N/A 4/7/2019    EMERGENT CPR,  CORONARY ARTERY BYPASS X4, ON PUMP ,CHEST LEFT OPEN; SWAN, ANGELA PER ANESTHESIA performed by Ahsan Moore MD at [address] [facility]      unsure if correct known tibial surgery    HARDWARE REMOVAL Right 04/07/2019    R Knee internal screw protruding through skin    HARDWARE REMOVAL Right 04/22/2019    HARDWARE REMOVAL RIGHT TIBIA,    RECONSTRUCTIVE REPAIR STERNAL N/A 4/8/2019    STERNAL  WOUND WASHOUT AND CLOSURE S/P CABG performed by Ahsan Moore MD at [address] [facility] N/A 4/22/2019    HARDWARE REMOVAL RIGHT TIBIA, IRRIGATION AND DEBRIDEMENT [facility] performed by Jen Goldstein MD at [address] Right     [place] off roof, external fixation and internal hardware    TONSILLECTOMY                TOBACCO:   reports that he has quit smoking. He does not have any smokeless tobacco history on file. ETOH:   has no alcohol history on file.           ALLERGIES:  No Known Allergies    Home Meds:   Prior to Admission medications    Not on File         Intake/Output Summary (Last 24 hours) at 4/23/2019 2311  Last data filed at 4/23/2019 1723  Gross per 24 hour   Intake 2044.58 ml   Output 1175 ml   Net 869.58 ml         Diet   DIET GENERAL;  Dietary Nutrition Supplements: Standard High Calorie Oral Supplement    Vitals:   BP (!) 147/66   Pulse 95   Temp 99 °F (37.2 °C) (Oral)   Resp 17   Ht 5' 8\" (1.727 m)   Wt 161 lb 6 oz (73.2 kg)   SpO2 97%   BMI 24.54 kg/m²  on         I/O (24 Hours)    Patient Vitals for the past 8 hrs:   BP Temp Temp src Pulse Resp SpO2   04/23/19 1942 (!) 147/66 99 °F (37.2 °C) Oral 95 17 97 %       Intake/Output Summary (Last 24 hours) at 4/23/2019 2311  Last data filed at 4/23/2019 1723  Gross per 24 hour   Intake 2044.58 ml grossly normal  and reflexes normal 2+  throughout     Clubbing No  Lower ext edema Yes1+   [] , 2 +  [] , 3+   []  Upper ext edema No       Musculoskeletal - no joint swelling or tenderness or synovitis            Medications:    Scheduled Meds:   furosemide  40 mg Oral BID    metoprolol tartrate  50 mg Oral BID    potassium chloride  40 mEq Oral BID WC    spironolactone  25 mg Oral Daily    insulin lispro  0-12 Units Subcutaneous TID WC    insulin lispro  0-6 Units Subcutaneous Nightly    insulin glargine  30 Units Subcutaneous Daily    lisinopril  5 mg Oral Daily    doxycycline hyclate  100 mg Oral 2 times per day    famotidine  20 mg Oral BID    enoxaparin  40 mg Subcutaneous Daily    nystatin  5 mL Oral 4x Daily    aspirin  81 mg Oral Daily    docusate  100 mg Oral Daily    dextrose  12.5 g Intravenous Once    albuterol  2.5 mg Nebulization Q6H    sodium chloride flush  10 mL Intravenous 2 times per day    sodium chloride flush  10 mL Intravenous 2 times per day    polyethylene glycol  17 g Oral Daily    chlorhexidine  15 mL Mouth/Throat BID    therapeutic multivitamin-minerals  1 tablet Oral Daily with breakfast    atorvastatin  40 mg Oral Nightly    clopidogrel  75 mg Oral Daily       Continuous Infusions:   dextrose 5 % and 0.45 % NaCl 75 mL/hr at 04/22/19 1750    sodium chloride      niCARdipine      dextrose         PRN Meds:  potassium chloride **OR** potassium alternative oral replacement **OR** potassium chloride, potassium chloride, potassium chloride, magnesium hydroxide, fentanNYL, sodium chloride flush, labetalol, albuterol, sodium chloride flush, sodium chloride flush, calcium chloride IVPB, magnesium sulfate, potassium chloride, acetaminophen, acetaminophen, oxyCODONE-acetaminophen **OR** oxyCODONE-acetaminophen, diphenhydrAMINE, bisacodyl, ondansetron, hydrALAZINE, albumin human, glucose, dextrose, glucagon (rDNA), dextrose    Labs:  CBC:   Recent Labs 04/21/19  0502 04/22/19  0540 04/23/19  0431   WBC 18.8* 16.5* 17.7*   HGB 8.7* 8.9* 8.4*   HCT 29.1* 29.0* 28.8*   MCV 99.3 96.3 100.7   * 530* 539*     BMP:   Recent Labs     04/21/19  0502 04/22/19  0540 04/22/19 2026 04/23/19  0431 04/23/19  1152    142 136 135  --    K 3.9 3.0* 3.2* 3.4* 4.1    107 101 101  --    CO2 25 25 24 25  --    BUN 16 11  --  10  --    CREATININE 0.44* 0.46*  --  0.45*  --      LIVER PROFILE: No results for input(s): AST, ALT, LIPASE, BILIDIR, BILITOT, ALKPHOS in the last 72 hours. Invalid input(s): AMYLASE,  ALB  PT/INR:   Recent Labs     04/21/19  0502 04/22/19  0540 04/23/19  0431   PROTIME 14.1* 13.1* 13.3*   INR 1.4 1.3 1.3     APTT: No results for input(s): APTT in the last 72 hours. UA:No results for input(s): NITRITE, COLORU, PHUR, LABCAST, WBCUA, RBCUA, MUCUS, TRICHOMONAS, YEAST, BACTERIA, CLARITYU, SPECGRAV, LEUKOCYTESUR, UROBILINOGEN, BILIRUBINUR, BLOODU, GLUCOSEU, AMORPHOUS in the last 72 hours. Invalid input(s): KETONESU  No results for input(s): PHART, [id number], PO2ART in the last 72 hours.     ABG   No results found for: PH, PCO2, PO2, HCO3, O2SAT  Lab Results   Component Value Date    MODE [facility] 04/19/2019             Films:  CXR  Improving pulm congestion          Assessment:   Principal Problem:    Cardiac arrest [facility])  Active Problems:    Acute ST elevation myocardial infarction (STEMI) of anterior wall (HCC)    Severe left ventricular systolic dysfunction    Acute respiratory failure with hypoxemia (HCC)    Cardiogenic pulmonary edema (HCC)    PAD (peripheral artery disease) (HCC)    Subacute osteomyelitis of right tibia (HCC)    Exposed orthopaedic hardware ([address]    Metabolic encephalopathy    Abnormal CT of the head    Fever chills    Hepatitis    Acute cerebral infarction associated with systemic hypoxia or ischemia    Multi infarct state    Carotid stenosis, bilateral    Hypokalemia    Hyperglycemia  Resolved Problems:    * No resolved hospital problems.  *          Plan:  Improving CHF  BP stable  No PND  No DVT  Have had Flue and pneumovax  Not a candidate for lung cancer screen  Does not need home O2    Electronically signed by Willie Harris MD on 4/23/2019 at 11:11 PM

## 2019-04-24 NOTE — PROGRESS NOTES
Progress Note    Patient:  Celine Yuen  YOB: 1965     46 y.o. male    Subjective:  Patient seen and examined at bedside this morning. No complaints or concerns per patient this morning. No acute issues overnight per nursing. Some mild continued confusion  Pain well-controlled. Denies: fever/chills, HA, CP, SOB, N/V, dysuria, or numbness/tingling in extremities. PT: Ambulated 150 feet with physical therapy    Objective:   Vitals:    04/24/19 0315   BP: 136/67   Pulse: 86   Resp: 16   Temp: 98.2 °F (36.8 °C)   SpO2: 96%     Gen: NAD, cooperative  RLE: Surgical dressing taken down to examine incision. Skin edges well approximated with sutures in place, small 1 cm x 1 cm central area of serosanguinous drainage. Mild surrounding erythema. No gross purulence noted. Absent plantar/dorsiflexion of the ankle. No toe movement noted. Dysesthesias present globally around foot though sensation in tact. Recent Labs     04/24/19  0421   WBC 18.0*   HGB 9.1*   HCT 30.4*   *   INR 1.4      K 3.8   BUN 9   CREATININE 0.43*   GLUCOSE 92       Meds: Doxycycline   See rec for complete list    Assessment/Plan : 46 y.o. male R tibia hardware infection, s/p bedside screw removal on 4/7, irrigation and debridement with hardware removal 4/22, POD#2    -Daily dressing changes to RLE per nursing  -WBAT RLE  -AFO ordered to RLE for foot drop  -ID on for osteomyelitis, recommending continued PO doxycycline.  -Pain control: per primary team  -Tolerating PO intake well  -Ice (20 min, 1 hour off) and elevation for edema/pain control  -Encourage IS and deep breathing  -DVT ppx: OK for chemical AC from orthopedic perspective  -PT/OT to continue evaluation and treatment  -Follow up with Dr. Lyudmila Joaquin in 7-10 days for wound check.   -OK for DC from orthopedic perspective, if in house will continue to follow.   -Please page ortho with any questions    Altamese Free DO,  PGY-2 Orthopedic Surgery  5:09 AM 4/24/2019

## 2019-04-24 NOTE — PROGRESS NOTES
Patient will need AFO and PRAFO due to RLE foot drop.  Orthotist on and aware.      --------------------------------------------------------------  Prashanth Lazaro DO, PGY-2  [address] [place] [address] Surgery   6:34 PM 04/24/19

## 2019-04-24 NOTE — PROGRESS NOTES
[facility] - Progress Note    Today's Date and Time: 4/24/2019, 11:25 AM    Impression :   1. CAD with cardiac arrest x 3. (on the field, in ER and in OR)  2. S/P emergent CABG x 4 on 4-7-19.   3. Open chest post CABG. 4. S/p delayed primary closure of chest incision on 4-8-19  5. Improvement in Poor cardiac function with LVEF 10%-->45%  6. Exposed orthopedic hardware Rt leg  7. S/P removal of Protruding orthopedic nail from Rt lateral knee area. Old blood and malodor. 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 8. S/P ORIF Rt proximal tibia. Non compliant with follow up. 9. Fevers  10. Pulmonary edema  11. ALMITA resolved  12. S/P removal of infected leg hardware. 4-22-19    Recommendations:     · Continue doxycycline for chronic leg osteomyelitis  · Patient had removal of infected hardware 4-22-19. Will await culture data- so far no growth      Medical Decision Making/Summary/Discussion:   · Patient admitted with chest pain and cardiac arrests x 3  · Emergent CABG x 4. S/P sternum closure on 4-8-19  · Found to have protruding Rt tibial orthopedic screw from prior ORIF of tibial fracture  · No overt infection of the leg  · Temp 103 on 4-11-19. Repeat blood cultures: No growth . Sputum with mixed oral tabitha. Vancomycin started. · ALMITA with Creatinine 0.4-->1. 19. Vancomycin d/c. Ceftaroline started. · ALMITA resolved  · HIDA scan ordered for elevated LFTs: Normal on 4-15-19  · MRI with occlusion Lt carotid artery and multiple infracts  · Extubated 4-19-19. More responsive  · Has 2 peripheral IVs, will D/C central line (12 days in)  · Rt groin puncture site had some drainage. Wound cleansed on 4-19-19  · Confusion at night 4-20.  Tried to remove tubes  · Removal of infected leg hardware 4-22-19  · Culture: No growth   Infection Control Recommendations   · [place] Precautions  · Contact Isolation     Antimicrobial Stewardship Recommendations     · Discontinuation of therapy    Coordination of Outpatient Care:   · Estimated Length of IV antimicrobials: D/C 4-11-19  · Patient will need Midline Catheter Insertion:   · Patient will need PICC line Insertion:  · Patient will need: Home IV , [place],  SNF,  LTAC: TBD  · Patient will need outpatient wound care:TBD    Chief complaint/reason for consultation:   · Emergent CABG  · Protruding orthopedic nail from Rt knee      History of Present Illness:   Philip Wolfe is a 46y.o.-year-old  male who was initially admitted on 4/7/2019. Patient seen at the request of Mary Bradshaw. INITIAL HISTORY:     Patient apparently developed chest pain and called EMS. Had cardiac arrest at the scene, was resuscitated and brought to ER where he had second cardiac arrest. Was resuscitated once again and taken to OR for emergent CABG x 4. He had another cardiac arrest in OR. Has survived the above and is back in the ICU. Found to have area of malodor on lateral aspect Rt knee, corresponding to a protruding orthopedic screw from a remote ORIF of the Rt  Tibial plateau. The screw has been removed and sent for culture. X rays show healed fracture of the proximal tibia. There is additional hardware in place that will likely need to be removed if patient survives. CURRENT EVALUATION : 4/24/2019    Patient seen and examined this morning. Stable  Laying in bed. Conversant. Says he is feeling better everyday. Had leg hardware removed on 4-22-19  Site clean. Some swelling and pain present  Hardware material: No growth  Has foot drop    Clinically showing overall improvement. On nasal 02    Labs reviewed: 4/24/2019    CT of head 4-13-19 showed scattered infarcts of different ages. MRI follow up 4/14 showed multiple new embolic strokes. Absent flow Lt carotid artery. Ultrasound 4-15-19 with occlusion of Lt internal carotid artery. CXR 4-15-19: Bilateral effusions, parahilar and lower zone airspace opacities.  Retrocardiac consolidation. Changes are those of CHF. Na 149 -> 150 -> 135 > 142  K 3.5 -> 3.4 > 3.8  WBCs 17.5 -> 25.1 -> 30 0> 20.1 -> 18.8-->17.7 > 18  Hgb 9.1. -->8.7 -> 9.4 -->8.4 > 9.1    Echocardiogram showed mildly reduced ejection fraction. No clot visualized. Neuroendovascular surgery is planning on cerebral angiogram with possible stenting. Cultures:  Wound:  · Orthopedic screw: Leg screw cultures found. Direct exam showed many gram + cocci in clusters and few gram - rods, NGTD. Lab reports quality of sample is questioned and to interpret results carefully. · Hardware: 4-2-19: No growth   Blood:  · Blood cultures 4/11 x 2 with no growth to date. Urine:  · Urine culture 4/12 with no growth to date. Sputum  · Sputum culture 4/12 with mixed bacterial morphotypes. Discussed with RN, family. I have personally reviewed the past medical history, past surgical history, medications, social history, and family history, and I have updated the database accordingly.   Past Medical History:     Past Medical History:   Diagnosis Date    CAD (coronary artery disease) 04/07/2019    Cardiac arrest ([facility] [address] 04/07/2019    Osteolysis 04/07/2019    R Knee hardware    PEA (Pulseless electrical activity) ([facility] [address] 04/07/2019    PVD (peripheral vascular disease) ([facility] [address] 04/07/2019       Past Surgical  History:     Past Surgical History:   Procedure Laterality Date    CARDIAC CATHETERIZATION  04/07/2019    MVD-Stv    CORONARY ARTERY BYPASS GRAFT  04/07/2019    x4 per Dr. Guerra Borne, [address] [place] [address] GRAFT N/A 4/7/2019    EMERGENT CPR,  CORONARY ARTERY BYPASS X4, ON PUMP ,CHEST LEFT OPEN; ANETTE, ANGELA PER ANESTHESIA performed by Michelle Iniguez MD at [address] [place] [address],[address]      unsure if correct known tibial surgery    HARDWARE REMOVAL Right 04/07/2019    R Knee internal screw protruding through skin    HARDWARE REMOVAL Right 04/22/2019    HARDWARE REMOVAL RIGHT TIBIA,    RECONSTRUCTIVE REPAIR STERNAL N/A 4/8/2019    STERNAL  WOUND WASHOUT AND CLOSURE S/P CABG performed by Dina Anand MD at [address] N/A 4/22/2019    HARDWARE REMOVAL RIGHT TIBIA, IRRIGATION AND DEBRIDEMENT [address] performed by Bronson Lewis MD at [address] Right     Lorilee Emperor off roof, external fixation and internal hardware    TONSILLECTOMY         Medications:      furosemide  40 mg Oral BID    metoprolol tartrate  50 mg Oral BID    potassium chloride  40 mEq Oral BID WC    spironolactone  25 mg Oral Daily    insulin lispro  0-12 Units Subcutaneous TID WC    insulin lispro  0-6 Units Subcutaneous Nightly    insulin glargine  30 Units Subcutaneous Daily    lisinopril  5 mg Oral Daily    doxycycline hyclate  100 mg Oral 2 times per day    famotidine  20 mg Oral BID    enoxaparin  40 mg Subcutaneous Daily    nystatin  5 mL Oral 4x Daily    aspirin  81 mg Oral Daily    docusate  100 mg Oral Daily    dextrose  12.5 g Intravenous Once    albuterol  2.5 mg Nebulization Q6H    sodium chloride flush  10 mL Intravenous 2 times per day    sodium chloride flush  10 mL Intravenous 2 times per day    polyethylene glycol  17 g Oral Daily    chlorhexidine  15 mL Mouth/Throat BID    therapeutic multivitamin-minerals  1 tablet Oral Daily with breakfast    atorvastatin  40 mg Oral Nightly    clopidogrel  75 mg Oral Daily       Social History:     Social History     Socioeconomic History    Marital status: Unknown     Spouse name: Not on file    Number of children: Not on file    Years of education: Not on file    Highest education level: Not on file   Occupational History    Not on file   Social Needs    Financial resource strain: Not on file    Food insecurity:     Worry: Not on file     Inability: Not on file    Transportation needs:     Medical: Not on file     Non-medical: Not on file   Tobacco Use    Smoking status: Former Smoker   Substance and Skin: Warm and dry with good turgor. Signs of peripheral arterial insufficiency. Rt lateral knee with open incision. Chest incision closed. Medical Decision Making -Laboratory:   I have independently reviewed/ordered the following labs:    CBC with Differential:   Recent Labs     04/23/19 0431 04/24/19  0421   WBC 17.7* 18.0*   HGB 8.4* 9.1*   HCT 28.8* 30.4*   * 620*     BMP:   Recent Labs     04/23/19  0431 04/23/19  1152 04/24/19  0421     --  142   K 3.4* 4.1 3.8     --  105   CO2 25  --  25   BUN 10  --  9   CREATININE 0.45*  --  0.43*   MG 2.0  --  1.9     Hepatic Function Panel:   No results for input(s): PROT, LABALBU, BILIDIR, IBILI, BILITOT, ALKPHOS, ALT, AST in the last 72 hours. No results for input(s): RPR in the last 72 hours. No results for input(s): HIV in the last 72 hours. No results for input(s): BC in the last 72 hours. Lab Results   Component Value Date    MUCUS NOT REPORTED 04/14/2019    RBC 3.11 04/24/2019    TRICHOMONAS NOT REPORTED 04/14/2019    WBC 18.0 04/24/2019    YEAST NOT REPORTED 04/14/2019    TURBIDITY CLOUDY 04/14/2019     Lab Results   Component Value Date    CREATININE 0.43 04/24/2019    GLUCOSE 92 04/24/2019       Medical Decision Making-Imaging:     EXAMINATION:   3 XRAY VIEWS OF THE RIGHT KNEE       4/7/2019 5:00 pm       COMPARISON:   04/07/2019       HISTORY:   ORDERING SYSTEM PROVIDED HISTORY: s/p Hardware removal at bedside   TECHNOLOGIST PROVIDED HISTORY:   s/p Hardware removal at bedside       FINDINGS:   Interval removal of anterior superior fixating screw.  No periprosthetic   fracture.  Chronic deformity of the proximal fibula.  Degenerative changes of   knee and tib-fib syndesmosis.            Impression   Removal of anterior-superior fixating screw without interval change otherwise     EXAMINATION:   3 XRAY VIEWS OF THE RIGHT KNEE       4/7/2019 3:28 pm       COMPARISON:   None.       HISTORY:   ORDERING SYSTEM PROVIDED HISTORY: documented by the resident.     Adriana Duarte MD.

## 2019-04-24 NOTE — PROGRESS NOTES
Assistance  Comment: Pt has a retro lean with standing. VC's given for proper hand placement for safe transfers, with good return from pt. Ambulation  Ambulation?: Yes  Ambulation 1  Surface: level tile  Device: Rolling Walker  Other apparatus: 2L O2, chair to follow  Assistance: Moderate assistance  Quality of Gait: WBAT Right LE, Flexed posture,AFO on R foot,step too gait with L foot. Pt demonstrates weak Dorsiflexion on L foot. Pt's L foot inverts. Distance: 150ft  Comments: Posterior LOB x1,Chair to follow. Pt required 3 seated rest breaks d/t fatigue. Exercises  Seated LE exercise program: Long Arc Quads, isometric hip abduction/adduction, heel/toe raises, and marches. Reps: 20 x each with 2 lb wt on B LE's. Comment: Verbal and tactile cues for ex's on his R LE. Pt had a good return with cues. Assessment   Assessment: Pt ambulated 150ft Mod A with use of RW. Pt took 3 seated rest breaks d/t fatigue and weakness in LE's. Pt required VC's for hand placement with transfers with good return. Treatment Diagnosis: general weakness; difficulty walking  Prognosis: Good  Patient Education: Mobility and getting out of bed to chair. Activity Tolerance  Activity Tolerance: Patient Tolerated treatment well  Activity Tolerance: not able to bear wt on right enough to advance left LE. Fatigue with exercises        Goals  Short term goals  Time Frame for Short term goals: 14 visits  Short term goal 1: Pt to transfer sit to stand with min A + 1. Short term goal 2: Pt to ambulate with least restrictive AD 40ft with no LOB. Short term goal 3: Pt to demonstrate good sitting balance. Short term goal 4: Pt to tolerate 20-30 mins ther ex/act for improved strength and endurance for ADL's.    Patient Goals   Patient goals : none stated    Plan    Plan  Times per week:  BID  Times per day: Twice a day  Current Treatment Recommendations: ROM, Strengthening, Functional Mobility Training, Endurance Training, Transfer Training, Gait Training, Stair training, Balance Training, Neuromuscular Re-education, Home Exercise Program, Safety Education & Training  Safety Devices  Type of devices: All fall risk precautions in place, Call light within reach, Nurse notified, Gait belt, Chair alarm in place, Left in chair, Patient at risk for falls  Restraints  Initially in place: No  Restraints:  chair alarm     Therapy Time   Individual Concurrent Group Co-treatment   Time In   0220         Time Out   0250         Minutes  10 [place], [place]  Treatment performed by Student PTA under the supervision of co-signing PTA who agrees with all treatment and documentation.    Kela Peabody, PTA

## 2019-04-24 NOTE — PLAN OF CARE
BRONCHOSPASM/BRONCHOCONSTRICTION     ? IMPROVE AERATION/BREATH SOUNDS  ? ADMINISTER BRONCHODILATOR THERAPY AS APPROPRIATE  ? ASSESS BREATH SOUNDS  ? IMPLEMENT AEROSOL/MDI PROTOCOL  ?    PATIENT EDUCATION AS NEEDED       Problem: OXYGENATION/RESPIRATORY FUNCTION  Goal: Patient will maintain patent airway  Outcome: Ongoing

## 2019-04-24 NOTE — CARE COORDINATION
Left VM for Juan at [facility], await return call    [phone] spoke with Amanda at [facility], she says patient looks good for IP rehab, updated on plans for LU stenting tomorrow, they will expect patient to admit Friday or Saturday    1535 spoke with Elvia Morgan regarding measurement for brace, he will be here tonight to measure patient, will get brace tomorrow    [address] [place] [facility] is requesting an order for each a bed and a walking brace.   PS Dr. Elvia Valdivia with regard to this

## 2019-04-24 NOTE — PROGRESS NOTES
[facility]  Daily Progress Note    Surgeon:  Dr. Mc Kelsey  S/P :  Emerg Coronary artery bypass 4/7/19  Chest closure 4/8/19  EF: 45 %          Subjective:  Mr. Mercy Bui feels well today with no acute complaints. Pain is controlled. OOBTC and ambulating. Physical Exam  Vital Signs: BP (!) 160/59   Pulse 79   Temp 97.5 °F (36.4 °C) (Oral)   Resp 16   Ht 5' 8\" (1.727 m)   Wt 163 lb 12.8 oz (74.3 kg)   SpO2 97%   BMI 24.91 kg/m²  O2 Flow Rate (L/min): 2 L/min   Admit Weight: Weight: 176 lb 5.9 oz (80 kg)   WEIGHTWeight: 163 lb 12.8 oz (74.3 kg)     General: alert and oriented to person, place and time. Up in chair, No apparent distress. Heart:Normal S1 and S2.  Regular rhythm. No murmurs, gallops, or rubs. Pacing Wires No   Lungs: clear to auscultation bilaterally  Abdomen: soft, non tender, non distended, BSx4  Extremities: negative  Wounds: clean and dry, healing appropriately.      Scheduled Meds:    furosemide  40 mg Oral BID    metoprolol tartrate  50 mg Oral BID    potassium chloride  40 mEq Oral BID WC    spironolactone  25 mg Oral Daily    insulin lispro  0-12 Units Subcutaneous TID WC    insulin lispro  0-6 Units Subcutaneous Nightly    insulin glargine  30 Units Subcutaneous Daily    lisinopril  5 mg Oral Daily    doxycycline hyclate  100 mg Oral 2 times per day    famotidine  20 mg Oral BID    enoxaparin  40 mg Subcutaneous Daily    nystatin  5 mL Oral 4x Daily    aspirin  81 mg Oral Daily    docusate  100 mg Oral Daily    dextrose  12.5 g Intravenous Once    albuterol  2.5 mg Nebulization Q6H    sodium chloride flush  10 mL Intravenous 2 times per day    sodium chloride flush  10 mL Intravenous 2 times per day    polyethylene glycol  17 g Oral Daily    chlorhexidine  15 mL Mouth/Throat BID    therapeutic multivitamin-minerals  1 tablet Oral Daily with breakfast    atorvastatin  40 mg Oral Nightly    clopidogrel  75 mg Oral Daily Continuous Infusions:    dextrose 5 % and 0.45 % NaCl 75 mL/hr at 04/22/19 1750    sodium chloride      niCARdipine      dextrose         Data:  CBC:   Recent Labs     04/22/19  0540 04/23/19  0431 04/24/19  0421 04/24/19  1144   WBC 16.5* 17.7* 18.0*  --    HGB 8.9* 8.4* 9.1*  --    HCT 29.0* 28.8* 30.4*  --    MCV 96.3 100.7 97.7  --    * 539* 620* 667*     BMP:   Recent Labs     04/22/19  0540 04/22/19  2026 04/23/19  0431 04/23/19  1152 04/24/19  0421    136 135  --  142   K 3.0* 3.2* 3.4* 4.1 3.8    101 101  --  105   CO2 25 24 25  --  25   BUN 11  --  10  --  9   CREATININE 0.46*  --  0.45*  --  0.43*     PT/INR:   Recent Labs     04/22/19  0540 04/23/19 0431 04/24/19  0421   PROTIME 13.1* 13.3* 14.0*   INR 1.3 1.3 1.4     APTT: No results for input(s): APTT in the last 72 hours. I/O:  I/O last 3 completed shifts: In: 1748 [P.O.:1100; I.V.:542]  Out: 1750 [Urine:1750]      Assessment & Plan:   Patient Active Problem List   Diagnosis    Card[facility] arrest ([facility] [address]    Acute ST elevation myocardial infarction (STEMI) of anterior wall (HCC)    Severe left ventricular systolic dysfunction    Acute respiratory failure with hypoxemia (HCC)    Cardiogenic pulmonary edema (HCC)    PAD (peripheral artery disease) (HCC)    Subacute osteomyelitis of right tibia ([facility] [address]    Exposed orthopaedic hardware ([facility] [address]    Metabolic encephalopathy    Abnormal CT of the head    Fever chills    Hepatitis    Acute cerebral infarction associated with systemic hypoxia or ischemia    Multi infarct state    Carotid stenosis, bilateral    Hypokalemia    Hyperglycemia    Right foot drop     1.  S/p CABG Emergent  POD 17  Plan cerebral angio and possible LU stent - Thursday  Swallow study - passed with dental soft diet  Ortho for right prosthesis removal yesterday              Need ID recs for abx at discharge     DVT ppx: Lovenox & SCD's while stationary  Patient is on BB, ASA, Statin therapy per protocol   Plan for discharge - Acute rehab possibly - will continue to follow for needs           The above recommendations including medications and orders were discussed and agreed upon with Dr. Mercedes Lei the attending on service for the cardiothoracic surgery group today.      ISAAC Kamara, CNP

## 2019-04-24 NOTE — CARE COORDINATION
Spoke with Esme Dominique ([phone]) regarding RLE AFO for foot drop.   Faxed demographics and orders to [phone]

## 2019-04-24 NOTE — PROGRESS NOTES
[address]    Progress Note    4/24/2019    1:00 PM    Name:   Joaquín Shabazz  MRN:     [id number]     [place]:      [de-identified]   Room:   [address] [place] [address] Day:  16  Admit Date:  4/7/2019  4:48 AM    PCP:   No primary care provider on file. Code Status:  Full Code    Subjective:     C/C:   Chief Complaint   Patient presents with    Cardiac Arrest     diff breather, brought in on cpap with elevations in EKG on EMS arrival.      Interval History Status:  Doing okay with diet  Pain controlled  No chills or sweats  Still confused at times    Database updates:  WBC 18.0High k/uL RBC 3. 11Low m/uL Hemoglobin 9.1Low   Calcium 8.5Low   Platelets 614CMRB       Brief History:     As documented in the medical record: \"The patient is a 46 y.o. male who is admitted for  cardiac arrest and STEMI. Patient was admitted on 4/7/19 after he was brought in by EMS for dyspnea, acute respiratory failure. Patient was placed on CPAP and nebulization treatment. Initial EKG showed possible anterior septal STEMI. On arrival to [facility] emergency room patient had PEA arrest and was resuscitated per ACS protocol with 3 rounds of CPR and epinephrine with successful ROSC. Patient underwent emergent cardiac cath and showed multivessel CAD with severe LV dysfunction with EF 15% and severe hypokinesis. Patient had emergent CABG x 4 on 4/7/19 with closure of chest wall on 4/8/19. [facility] Patient developed acute kidney injury post-CAB with creatinine peaking at 1.99 which improved after fluid resuscitation and pressor support. Patient was taken off pressor support. Patient was asked. On 4/10/19. Patient had altered mental status upon weaning from  sedatives . CT head showed scattered areas of encephalomalacia in bifrontal and left parietal areas consistent with remote infarct and possible subacute infarct in left lower parietal region.   Patient has underlying history of hypertension sodium chloride flush  10 mL Intravenous 2 times per day    polyethylene glycol  17 g Oral Daily    chlorhexidine  15 mL Mouth/Throat BID    therapeutic multivitamin-minerals  1 tablet Oral Daily with breakfast    atorvastatin  40 mg Oral Nightly    clopidogrel  75 mg Oral Daily     Continuous Infusions:    dextrose 5 % and 0.45 % NaCl 75 mL/hr at 19 1750    sodium chloride      niCARdipine      dextrose       PRN Meds: potassium chloride **OR** potassium alternative oral replacement **OR** potassium chloride, potassium chloride, potassium chloride, magnesium hydroxide, sodium chloride flush, labetalol, albuterol, sodium chloride flush, sodium chloride flush, calcium chloride IVPB, magnesium sulfate, potassium chloride, acetaminophen, acetaminophen, oxyCODONE-acetaminophen **OR** oxyCODONE-acetaminophen, diphenhydrAMINE, bisacodyl, ondansetron, hydrALAZINE, albumin human, glucose, dextrose, glucagon (rDNA), dextrose      Review of Systems:     Review of Systems   Constitutional: Negative for chills and fever. Respiratory: Negative for cough and shortness of breath. Cardiovascular: Positive for chest pain (his incisional pain is controlled). Negative for palpitations. Gastrointestinal: Negative for abdominal pain, nausea and vomiting. Genitourinary: Negative for difficulty urinating and hematuria. Neurological: Positive for weakness. Psychiatric/Behavioral: Positive for confusion. Physical Examination:        Vitals:  BP (!) 160/59   Pulse 79   Temp 97.5 °F (36.4 °C) (Oral)   Resp 16   Ht 5' 8\" (1.727 m)   Wt 163 lb 12.8 oz (74.3 kg)   SpO2 97%   BMI 24.91 kg/m²   Temp (24hrs), Av.3 °F (36.8 °C), Min:97.5 °F (36.4 °C), Max:99.1 °F (37.3 °C)    Recent Labs     19  1156 19  1728 19  0732   POCGLU 158* 100 118* 93       Physical Exam   Constitutional: No distress. HENT:   Head: Normocephalic.    Nose: Nose normal.   Eyes: Conjunctivae are normal. No scleral icterus. Neck: Neck supple. No tracheal deviation present. Cardiovascular: Normal rate and regular rhythm. Pulmonary/Chest: Effort normal and breath sounds normal. No respiratory distress. He has no wheezes. He has no rales. He exhibits no tenderness. Abdominal: Soft. Bowel sounds are normal. He exhibits no distension. There is no tenderness. Musculoskeletal: He exhibits no edema or tenderness. Neurological: He is alert. Having trouble with historical data  The patient does have right foot drop   Skin: Skin is warm and dry. He is not diaphoretic. Wounds clean and dry   Vitals reviewed. Data:     I/O (24Hr): Intake/Output Summary (Last 24 hours) at 4/24/2019 1300  Last data filed at 4/24/2019 1230  Gross per 24 hour   Intake 1642 ml   Output 2450 ml   Net -808 ml       Labs:    Hematology:  Recent Labs     04/22/19  0540 04/23/19  0431 04/24/19  0421 04/24/19  1144   WBC 16.5* 17.7* 18.0*  --    HGB 8.9* 8.4* 9.1*  --    HCT 29.0* 28.8* 30.4*  --    * 539* 620* 667*   INR 1.3 1.3 1.4  --      Chemistry:  Recent Labs     04/22/19  0540 04/22/19  2026 04/23/19  0431 04/23/19  1152 04/24/19  0421    136 135  --  142   K 3.0* 3.2* 3.4* 4.1 3.8    101 101  --  105   CO2 25 24 25  --  25   GLUCOSE 132*  --  136*  --  92   BUN 11  --  10  --  9   CREATININE 0.46*  --  0.45*  --  0.43*   MG 1.9 2.0 2.0  --  1.9   CALCIUM 7.9*  --  7.8*  --  8.5*     No results for input(s): PROT, LABALBU, LABA1C, M9PPLAT, U2GGLLL, FT4, TSH, AST, ALT, LDH, GGT, ALKPHOS, BILITOT, BILIDIR, AMMONIA, AMYLASE, LIPASE, LACTATE, CHOL, TRIG, HDL, LDLCALC, LDLDIRECT, LABVLDL, BNP, TROPONINI, CKTOTAL, CKMB, CKMBINDEX in the last 72 hours.     Lab Results   Component Value Date/Time    SPECIAL NOT REPORTED 04/22/2019 04:08 PM     Lab Results   Component Value Date/Time    CULTURE NO GROWTH 20 HOURS 04/22/2019 04:08 PM       Lab Results   Component Value Date    POCPH 7.451 04/19/2019 POCPCO2 44.1 04/19/2019    POCPO2 72.4 04/19/2019    POCHCO3 30.8 04/19/2019    NBEA NOT REPORTED 04/19/2019    PBEA 6 04/19/2019    HOZ3BYR 32 04/19/2019    WKXS0LZT 95 04/19/2019    FIO2 40.0 04/19/2019       Radiology / Diagnostics:  See above      Assessment:        Primary Problem  Principal Problem:    Cardiac arrest [facility])  Active Problems:    Acute ST elevation myocardial infarction (STEMI) of anterior wall (HCC)    Severe left ventricular systolic dysfunction    Acute respiratory failure with hypoxemia (HCC)    Cardiogenic pulmonary edema (HCC)    PAD (peripheral artery disease) (HCC)    Subacute osteomyelitis of right tibia ([facility])    Exposed orthopaedic hardware ([address]    Metabolic encephalopathy    Abnormal CT of the head    Fever chills    Hepatitis    Acute cerebral infarction associated with systemic hypoxia or ischemia    Multi infarct state    Carotid stenosis, bilateral    Hypokalemia    Hyperglycemia  Resolved Problems:    * No resolved hospital problems.  *      Plan         Optimize cardio-pulmonary function  Cardiology evaluation  Pulmonary evaluation  Cardiothoracic surgical evaluation  Antibiotics per C&S / Infectious Disease  Check bun and creatinine / renal evaluation  Neurology evaluation  Orthopedic evaluation - hardware removed right leg / tibial fracture  Wound care  Carotid angiogram and possible right ICA stent  Diet as tolerated  Seroquel  Anemia - blood products as needed  Correct electrolyte abnormalities - potassium supplementation  Physical therapy and rehabilitation    Acute rehab evaluation  Brace for right foot drop    IP CONSULT TO CRITICAL CARE  IP CONSULT TO CARDIOTHORACIC SURGERY  IP CONSULT TO CARDIOLOGY  IP CONSULT TO CARDIAC REHAB  IP CONSULT TO CASE MANAGEMENT  IP CONSULT TO DIETITIAN  IP CONSULT TO SPIRITUAL SERVICES  IP CONSULT TO ORTHOPEDIC SURGERY  IP CONSULT TO INFECTIOUS DISEASES  IP CONSULT TO DIETITIAN  IP CONSULT TO IV TEAM  IP CONSULT TO NEUROLOGY  IP CONSULT TO NEPHROLOGY  IP CONSULT TO INTERNAL MEDICINE  IP CONSULT TO IV TEAM  IP CONSULT TO ENDOVASCULAR NEUROSURGERY  PALLIATIVE CARE EVAL  IP CONSULT TO IV TEAM  IP CONSULT TO PHYSICAL MEDICINE REHAB  IP CONSULT TO IV TEAM  INPATIENT CONSULT TO ORTHOTIST/PROSTHETIST    Amirah Kwon DO    4/24/2019    1:00 PM

## 2019-04-25 ENCOUNTER — ANESTHESIA (OUTPATIENT)
Dept: INTERVENTIONAL RADIOLOGY/VASCULAR | Age: 54
End: 2019-04-25

## 2019-04-25 ENCOUNTER — APPOINTMENT (OUTPATIENT)
Dept: INTERVENTIONAL RADIOLOGY/VASCULAR | Age: 54
DRG: 165 | End: 2019-04-25
Payer: MEDICAID

## 2019-04-25 ENCOUNTER — ANESTHESIA EVENT (OUTPATIENT)
Dept: INTERVENTIONAL RADIOLOGY/VASCULAR | Age: 54
End: 2019-04-25

## 2019-04-25 ENCOUNTER — HOSPITAL ENCOUNTER (INPATIENT)
Age: 54
LOS: 14 days | Discharge: NURSING, REHAB OR LONG-TERM CARE | DRG: 045 | End: 2019-05-09
Attending: PHYSICAL MEDICINE & REHABILITATION | Admitting: PHYSICAL MEDICINE & REHABILITATION
Payer: MEDICAID

## 2019-04-25 VITALS
OXYGEN SATURATION: 97 % | SYSTOLIC BLOOD PRESSURE: 113 MMHG | BODY MASS INDEX: 21.35 KG/M2 | HEART RATE: 71 BPM | RESPIRATION RATE: 21 BRPM | TEMPERATURE: 97.6 F | WEIGHT: 140.87 LBS | DIASTOLIC BLOOD PRESSURE: 56 MMHG | HEIGHT: 68 IN

## 2019-04-25 DIAGNOSIS — M86.261 SUBACUTE OSTEOMYELITIS OF RIGHT TIBIA (HCC): Primary | ICD-10-CM

## 2019-04-25 PROBLEM — I51.9 SEVERE LEFT VENTRICULAR SYSTOLIC DYSFUNCTION: Status: RESOLVED | Noted: 2019-04-07 | Resolved: 2019-04-25

## 2019-04-25 PROBLEM — I51.89 SEVERE LEFT VENTRICULAR SYSTOLIC DYSFUNCTION: Status: RESOLVED | Noted: 2019-04-07 | Resolved: 2019-04-25

## 2019-04-25 PROBLEM — I63.9 ACUTE CVA (CEREBROVASCULAR ACCIDENT) (HCC): Status: ACTIVE | Noted: 2019-04-25

## 2019-04-25 PROBLEM — I50.1 CARDIOGENIC PULMONARY EDEMA (HCC): Status: RESOLVED | Noted: 2019-04-07 | Resolved: 2019-04-25

## 2019-04-25 PROBLEM — I46.9 CARDIAC ARREST (HCC): Status: RESOLVED | Noted: 2019-04-07 | Resolved: 2019-04-25

## 2019-04-25 PROBLEM — J96.01 ACUTE RESPIRATORY FAILURE WITH HYPOXEMIA (HCC): Status: RESOLVED | Noted: 2019-04-07 | Resolved: 2019-04-25

## 2019-04-25 LAB
ABO/RH: NORMAL
ANION GAP SERPL CALCULATED.3IONS-SCNC: 11 MMOL/L (ref 9–17)
ANTIBODY SCREEN: NEGATIVE
ARM BAND NUMBER: NORMAL
BLOOD BANK SPECIMEN: NORMAL
BUN BLDV-MCNC: 13 MG/DL (ref 6–20)
BUN/CREAT BLD: ABNORMAL (ref 9–20)
CALCIUM SERPL-MCNC: 8.9 MG/DL (ref 8.6–10.4)
CHLORIDE BLD-SCNC: 102 MMOL/L (ref 98–107)
CO2: 26 MMOL/L (ref 20–31)
COLLAGEN ADENOSINE-5'-DIPHOSPHATE (ADP) TIME: 88 SEC (ref 67–112)
COLLAGEN EPINEPHRINE TIME: 150 SEC (ref 85–172)
CREAT SERPL-MCNC: 0.48 MG/DL (ref 0.7–1.2)
EXPIRATION DATE: NORMAL
GFR AFRICAN AMERICAN: >60 ML/MIN
GFR NON-AFRICAN AMERICAN: >60 ML/MIN
GFR SERPL CREATININE-BSD FRML MDRD: ABNORMAL ML/MIN/{1.73_M2}
GFR SERPL CREATININE-BSD FRML MDRD: ABNORMAL ML/MIN/{1.73_M2}
GLUCOSE BLD-MCNC: 164 MG/DL (ref 75–110)
GLUCOSE BLD-MCNC: 175 MG/DL (ref 75–110)
GLUCOSE BLD-MCNC: 192 MG/DL (ref 75–110)
GLUCOSE BLD-MCNC: 193 MG/DL (ref 70–99)
GLUCOSE BLD-MCNC: 267 MG/DL (ref 75–110)
GLUCOSE BLD-MCNC: 330 MG/DL (ref 75–110)
HCT VFR BLD CALC: 32.9 % (ref 40.7–50.3)
HEMOGLOBIN: 10.2 G/DL (ref 13–17)
INR BLD: 1.2
LACTIC ACID, WHOLE BLOOD: 1.6 MMOL/L (ref 0.7–2.1)
MAGNESIUM: 1.9 MG/DL (ref 1.6–2.6)
MCH RBC QN AUTO: 29.5 PG (ref 25.2–33.5)
MCHC RBC AUTO-ENTMCNC: 31 G/DL (ref 28.4–34.8)
MCV RBC AUTO: 95.1 FL (ref 82.6–102.9)
NRBC AUTOMATED: 0 PER 100 WBC
PDW BLD-RTO: 13.9 % (ref 11.8–14.4)
PLATELET # BLD: 733 K/UL (ref 138–453)
PLATELET FUNCTION INTERP: NORMAL
PMV BLD AUTO: 10.9 FL (ref 8.1–13.5)
POTASSIUM SERPL-SCNC: 4.1 MMOL/L (ref 3.7–5.3)
PROCALCITONIN: 0.2 NG/ML
PROTHROMBIN TIME: 12.9 SEC (ref 9–12)
RBC # BLD: 3.46 M/UL (ref 4.21–5.77)
SODIUM BLD-SCNC: 139 MMOL/L (ref 135–144)
WBC # BLD: 16.6 K/UL (ref 3.5–11.3)

## 2019-04-25 PROCEDURE — 6360000002 HC RX W HCPCS: Performed by: STUDENT IN AN ORGANIZED HEALTH CARE EDUCATION/TRAINING PROGRAM

## 2019-04-25 PROCEDURE — 6370000000 HC RX 637 (ALT 250 FOR IP): Performed by: STUDENT IN AN ORGANIZED HEALTH CARE EDUCATION/TRAINING PROGRAM

## 2019-04-25 PROCEDURE — 85576 BLOOD PLATELET AGGREGATION: CPT

## 2019-04-25 PROCEDURE — 86900 BLOOD TYPING SEROLOGIC ABO: CPT

## 2019-04-25 PROCEDURE — 99233 SBSQ HOSP IP/OBS HIGH 50: CPT | Performed by: INTERNAL MEDICINE

## 2019-04-25 PROCEDURE — 1200000000 HC SEMI PRIVATE

## 2019-04-25 PROCEDURE — 83735 ASSAY OF MAGNESIUM: CPT

## 2019-04-25 PROCEDURE — 94762 N-INVAS EAR/PLS OXIMTRY CONT: CPT

## 2019-04-25 PROCEDURE — 86850 RBC ANTIBODY SCREEN: CPT

## 2019-04-25 PROCEDURE — 86901 BLOOD TYPING SEROLOGIC RH(D): CPT

## 2019-04-25 PROCEDURE — 97110 THERAPEUTIC EXERCISES: CPT

## 2019-04-25 PROCEDURE — 2580000003 HC RX 258: Performed by: STUDENT IN AN ORGANIZED HEALTH CARE EDUCATION/TRAINING PROGRAM

## 2019-04-25 PROCEDURE — 97127 HC SP THER IVNTJ W/FOCUS COG FUNCJ: CPT

## 2019-04-25 PROCEDURE — 1180000000 HC REHAB R&B

## 2019-04-25 PROCEDURE — APPSS30 APP SPLIT SHARED TIME 16-30 MINUTES: Performed by: PHYSICIAN ASSISTANT

## 2019-04-25 PROCEDURE — 82947 ASSAY GLUCOSE BLOOD QUANT: CPT

## 2019-04-25 PROCEDURE — 84145 PROCALCITONIN (PCT): CPT

## 2019-04-25 PROCEDURE — 97116 GAIT TRAINING THERAPY: CPT

## 2019-04-25 PROCEDURE — 2700000000 HC OXYGEN THERAPY PER DAY

## 2019-04-25 PROCEDURE — 85610 PROTHROMBIN TIME: CPT

## 2019-04-25 PROCEDURE — 83605 ASSAY OF LACTIC ACID: CPT

## 2019-04-25 PROCEDURE — 94667 MNPJ CHEST WALL 1ST: CPT

## 2019-04-25 PROCEDURE — 80048 BASIC METABOLIC PNL TOTAL CA: CPT

## 2019-04-25 PROCEDURE — 94640 AIRWAY INHALATION TREATMENT: CPT

## 2019-04-25 PROCEDURE — 99232 SBSQ HOSP IP/OBS MODERATE 35: CPT | Performed by: INTERNAL MEDICINE

## 2019-04-25 PROCEDURE — 94760 N-INVAS EAR/PLS OXIMETRY 1: CPT

## 2019-04-25 PROCEDURE — 36415 COLL VENOUS BLD VENIPUNCTURE: CPT

## 2019-04-25 PROCEDURE — 85027 COMPLETE CBC AUTOMATED: CPT

## 2019-04-25 PROCEDURE — 6370000000 HC RX 637 (ALT 250 FOR IP): Performed by: NURSE PRACTITIONER

## 2019-04-25 PROCEDURE — 99024 POSTOP FOLLOW-UP VISIT: CPT | Performed by: NURSE PRACTITIONER

## 2019-04-25 RX ORDER — OXYCODONE HYDROCHLORIDE AND ACETAMINOPHEN 5; 325 MG/1; MG/1
1 TABLET ORAL EVERY 4 HOURS PRN
Status: CANCELLED | OUTPATIENT
Start: 2019-04-25

## 2019-04-25 RX ORDER — LABETALOL HYDROCHLORIDE 5 MG/ML
5 INJECTION, SOLUTION INTRAVENOUS EVERY 10 MIN PRN
Status: DISCONTINUED | OUTPATIENT
Start: 2019-04-25 | End: 2019-04-25 | Stop reason: HOSPADM

## 2019-04-25 RX ORDER — METOPROLOL TARTRATE 50 MG/1
50 TABLET, FILM COATED ORAL 2 TIMES DAILY
Status: CANCELLED | OUTPATIENT
Start: 2019-04-25

## 2019-04-25 RX ORDER — INSULIN GLARGINE 100 [IU]/ML
30 INJECTION, SOLUTION SUBCUTANEOUS DAILY
Status: DISCONTINUED | OUTPATIENT
Start: 2019-04-26 | End: 2019-04-29

## 2019-04-25 RX ORDER — M-VIT,TX,IRON,MINS/CALC/FOLIC 27MG-0.4MG
1 TABLET ORAL
Status: DISCONTINUED | OUTPATIENT
Start: 2019-04-26 | End: 2019-05-09 | Stop reason: HOSPADM

## 2019-04-25 RX ORDER — FUROSEMIDE 40 MG/1
40 TABLET ORAL 2 TIMES DAILY
Status: DISCONTINUED | OUTPATIENT
Start: 2019-04-26 | End: 2019-04-29

## 2019-04-25 RX ORDER — ALBUTEROL SULFATE 2.5 MG/3ML
2.5 SOLUTION RESPIRATORY (INHALATION) EVERY 6 HOURS PRN
Status: CANCELLED | OUTPATIENT
Start: 2019-04-25

## 2019-04-25 RX ORDER — QUETIAPINE FUMARATE 25 MG/1
50 TABLET, FILM COATED ORAL NIGHTLY
Status: CANCELLED | OUTPATIENT
Start: 2019-04-25

## 2019-04-25 RX ORDER — FAMOTIDINE 20 MG/1
20 TABLET, FILM COATED ORAL 2 TIMES DAILY
Status: DISCONTINUED | OUTPATIENT
Start: 2019-04-25 | End: 2019-05-09 | Stop reason: HOSPADM

## 2019-04-25 RX ORDER — DEXTROSE MONOHYDRATE 25 G/50ML
12.5 INJECTION, SOLUTION INTRAVENOUS PRN
Status: CANCELLED | OUTPATIENT
Start: 2019-04-25

## 2019-04-25 RX ORDER — FAMOTIDINE 20 MG/1
20 TABLET, FILM COATED ORAL 2 TIMES DAILY
Status: CANCELLED | OUTPATIENT
Start: 2019-04-25

## 2019-04-25 RX ORDER — DEXTROSE MONOHYDRATE 50 MG/ML
100 INJECTION, SOLUTION INTRAVENOUS PRN
Status: CANCELLED | OUTPATIENT
Start: 2019-04-25

## 2019-04-25 RX ORDER — POLYETHYLENE GLYCOL 3350 17 G/17G
17 POWDER, FOR SOLUTION ORAL DAILY
Status: DISCONTINUED | OUTPATIENT
Start: 2019-04-25 | End: 2019-05-09 | Stop reason: HOSPADM

## 2019-04-25 RX ORDER — ASPIRIN 81 MG/1
81 TABLET, CHEWABLE ORAL DAILY
Status: DISCONTINUED | OUTPATIENT
Start: 2019-04-26 | End: 2019-05-09 | Stop reason: HOSPADM

## 2019-04-25 RX ORDER — POLYETHYLENE GLYCOL 3350 17 G/17G
17 POWDER, FOR SOLUTION ORAL DAILY
Status: DISCONTINUED | OUTPATIENT
Start: 2019-04-26 | End: 2019-04-25 | Stop reason: SDUPTHER

## 2019-04-25 RX ORDER — POTASSIUM CHLORIDE 20 MEQ/1
40 TABLET, EXTENDED RELEASE ORAL 2 TIMES DAILY WITH MEALS
Status: CANCELLED | OUTPATIENT
Start: 2019-04-25

## 2019-04-25 RX ORDER — METOPROLOL TARTRATE 50 MG/1
50 TABLET, FILM COATED ORAL 2 TIMES DAILY
Status: DISCONTINUED | OUTPATIENT
Start: 2019-04-25 | End: 2019-04-29

## 2019-04-25 RX ORDER — DEXTROSE MONOHYDRATE 25 G/50ML
12.5 INJECTION, SOLUTION INTRAVENOUS PRN
Status: DISCONTINUED | OUTPATIENT
Start: 2019-04-25 | End: 2019-05-09 | Stop reason: HOSPADM

## 2019-04-25 RX ORDER — ASPIRIN 81 MG/1
81 TABLET, CHEWABLE ORAL DAILY
Status: CANCELLED | OUTPATIENT
Start: 2019-04-26

## 2019-04-25 RX ORDER — DEXTROSE MONOHYDRATE 50 MG/ML
100 INJECTION, SOLUTION INTRAVENOUS PRN
Status: DISCONTINUED | OUTPATIENT
Start: 2019-04-25 | End: 2019-05-09 | Stop reason: HOSPADM

## 2019-04-25 RX ORDER — DOXYCYCLINE HYCLATE 100 MG
100 TABLET ORAL EVERY 12 HOURS SCHEDULED
Status: CANCELLED | OUTPATIENT
Start: 2019-04-25 | End: 2019-06-24

## 2019-04-25 RX ORDER — POLYETHYLENE GLYCOL 3350 17 G/17G
17 POWDER, FOR SOLUTION ORAL DAILY
Status: CANCELLED | OUTPATIENT
Start: 2019-04-26

## 2019-04-25 RX ORDER — DIPHENHYDRAMINE HCL 25 MG
25 TABLET ORAL NIGHTLY PRN
Status: DISCONTINUED | OUTPATIENT
Start: 2019-04-25 | End: 2019-04-27

## 2019-04-25 RX ORDER — ONDANSETRON 2 MG/ML
4 INJECTION INTRAMUSCULAR; INTRAVENOUS
Status: DISCONTINUED | OUTPATIENT
Start: 2019-04-25 | End: 2019-04-25 | Stop reason: HOSPADM

## 2019-04-25 RX ORDER — CLOPIDOGREL BISULFATE 75 MG/1
75 TABLET ORAL DAILY
Status: CANCELLED | OUTPATIENT
Start: 2019-04-26

## 2019-04-25 RX ORDER — DOCUSATE SODIUM 100 MG/1
100 CAPSULE, LIQUID FILLED ORAL 2 TIMES DAILY
Status: DISCONTINUED | OUTPATIENT
Start: 2019-04-25 | End: 2019-05-09 | Stop reason: HOSPADM

## 2019-04-25 RX ORDER — DOXYCYCLINE 100 MG/1
100 CAPSULE ORAL EVERY 12 HOURS SCHEDULED
Status: DISCONTINUED | OUTPATIENT
Start: 2019-04-25 | End: 2019-05-09 | Stop reason: HOSPADM

## 2019-04-25 RX ORDER — ACETAMINOPHEN 325 MG/1
650 TABLET ORAL EVERY 4 HOURS PRN
Status: DISCONTINUED | OUTPATIENT
Start: 2019-04-25 | End: 2019-04-26

## 2019-04-25 RX ORDER — NICOTINE POLACRILEX 4 MG
15 LOZENGE BUCCAL PRN
Status: CANCELLED | OUTPATIENT
Start: 2019-04-25

## 2019-04-25 RX ORDER — OXYCODONE HYDROCHLORIDE AND ACETAMINOPHEN 5; 325 MG/1; MG/1
2 TABLET ORAL EVERY 4 HOURS PRN
Status: DISCONTINUED | OUTPATIENT
Start: 2019-04-25 | End: 2019-04-26

## 2019-04-25 RX ORDER — M-VIT,TX,IRON,MINS/CALC/FOLIC 27MG-0.4MG
1 TABLET ORAL
Status: CANCELLED | OUTPATIENT
Start: 2019-04-26

## 2019-04-25 RX ORDER — NICOTINE POLACRILEX 4 MG
15 LOZENGE BUCCAL PRN
Status: DISCONTINUED | OUTPATIENT
Start: 2019-04-25 | End: 2019-05-09 | Stop reason: HOSPADM

## 2019-04-25 RX ORDER — ATORVASTATIN CALCIUM 40 MG/1
40 TABLET, FILM COATED ORAL NIGHTLY
Status: DISCONTINUED | OUTPATIENT
Start: 2019-04-25 | End: 2019-05-09 | Stop reason: HOSPADM

## 2019-04-25 RX ORDER — DIPHENHYDRAMINE HYDROCHLORIDE 50 MG/ML
12.5 INJECTION INTRAMUSCULAR; INTRAVENOUS
Status: DISCONTINUED | OUTPATIENT
Start: 2019-04-25 | End: 2019-04-25 | Stop reason: HOSPADM

## 2019-04-25 RX ORDER — DIPHENHYDRAMINE HCL 25 MG
25 TABLET ORAL NIGHTLY PRN
Status: CANCELLED | OUTPATIENT
Start: 2019-04-25

## 2019-04-25 RX ORDER — ALBUTEROL SULFATE 2.5 MG/3ML
2.5 SOLUTION RESPIRATORY (INHALATION) EVERY 6 HOURS PRN
Status: DISCONTINUED | OUTPATIENT
Start: 2019-04-25 | End: 2019-05-09 | Stop reason: HOSPADM

## 2019-04-25 RX ORDER — ATORVASTATIN CALCIUM 40 MG/1
40 TABLET, FILM COATED ORAL NIGHTLY
Status: CANCELLED | OUTPATIENT
Start: 2019-04-25

## 2019-04-25 RX ORDER — SPIRONOLACTONE 25 MG/1
25 TABLET ORAL DAILY
Status: DISCONTINUED | OUTPATIENT
Start: 2019-04-26 | End: 2019-04-29

## 2019-04-25 RX ORDER — OXYCODONE HYDROCHLORIDE AND ACETAMINOPHEN 5; 325 MG/1; MG/1
2 TABLET ORAL EVERY 4 HOURS PRN
Status: CANCELLED | OUTPATIENT
Start: 2019-04-25

## 2019-04-25 RX ORDER — LISINOPRIL 5 MG/1
5 TABLET ORAL DAILY
Status: CANCELLED | OUTPATIENT
Start: 2019-04-26

## 2019-04-25 RX ORDER — OXYCODONE HYDROCHLORIDE AND ACETAMINOPHEN 5; 325 MG/1; MG/1
1 TABLET ORAL EVERY 4 HOURS PRN
Status: DISCONTINUED | OUTPATIENT
Start: 2019-04-25 | End: 2019-04-26

## 2019-04-25 RX ORDER — LISINOPRIL 5 MG/1
5 TABLET ORAL DAILY
Status: DISCONTINUED | OUTPATIENT
Start: 2019-04-26 | End: 2019-04-29

## 2019-04-25 RX ORDER — FUROSEMIDE 40 MG/1
40 TABLET ORAL 2 TIMES DAILY
Status: CANCELLED | OUTPATIENT
Start: 2019-04-25

## 2019-04-25 RX ORDER — POTASSIUM CHLORIDE 20 MEQ/1
40 TABLET, EXTENDED RELEASE ORAL 2 TIMES DAILY WITH MEALS
Status: DISCONTINUED | OUTPATIENT
Start: 2019-04-26 | End: 2019-04-29

## 2019-04-25 RX ORDER — ACETAMINOPHEN 325 MG/1
650 TABLET ORAL EVERY 4 HOURS PRN
Status: CANCELLED | OUTPATIENT
Start: 2019-04-25

## 2019-04-25 RX ORDER — QUETIAPINE FUMARATE 50 MG/1
50 TABLET, FILM COATED ORAL NIGHTLY
Status: DISCONTINUED | OUTPATIENT
Start: 2019-04-25 | End: 2019-04-28

## 2019-04-25 RX ORDER — OXYCODONE HYDROCHLORIDE AND ACETAMINOPHEN 5; 325 MG/1; MG/1
2 TABLET ORAL PRN
Status: DISCONTINUED | OUTPATIENT
Start: 2019-04-25 | End: 2019-04-25 | Stop reason: HOSPADM

## 2019-04-25 RX ORDER — CLOPIDOGREL BISULFATE 75 MG/1
75 TABLET ORAL DAILY
Status: DISCONTINUED | OUTPATIENT
Start: 2019-04-26 | End: 2019-05-09 | Stop reason: HOSPADM

## 2019-04-25 RX ORDER — BISACODYL 10 MG
10 SUPPOSITORY, RECTAL RECTAL DAILY PRN
Status: DISCONTINUED | OUTPATIENT
Start: 2019-04-25 | End: 2019-05-09 | Stop reason: HOSPADM

## 2019-04-25 RX ORDER — MORPHINE SULFATE 2 MG/ML
2 INJECTION, SOLUTION INTRAMUSCULAR; INTRAVENOUS EVERY 5 MIN PRN
Status: DISCONTINUED | OUTPATIENT
Start: 2019-04-25 | End: 2019-04-25 | Stop reason: HOSPADM

## 2019-04-25 RX ORDER — OXYCODONE HYDROCHLORIDE AND ACETAMINOPHEN 5; 325 MG/1; MG/1
1 TABLET ORAL PRN
Status: DISCONTINUED | OUTPATIENT
Start: 2019-04-25 | End: 2019-04-25 | Stop reason: HOSPADM

## 2019-04-25 RX ORDER — SPIRONOLACTONE 25 MG/1
25 TABLET ORAL DAILY
Status: CANCELLED | OUTPATIENT
Start: 2019-04-26

## 2019-04-25 RX ORDER — INSULIN GLARGINE 100 [IU]/ML
30 INJECTION, SOLUTION SUBCUTANEOUS DAILY
Status: CANCELLED | OUTPATIENT
Start: 2019-04-26

## 2019-04-25 RX ORDER — FENTANYL CITRATE 50 UG/ML
25 INJECTION, SOLUTION INTRAMUSCULAR; INTRAVENOUS EVERY 5 MIN PRN
Status: DISCONTINUED | OUTPATIENT
Start: 2019-04-25 | End: 2019-04-25 | Stop reason: HOSPADM

## 2019-04-25 RX ADMIN — DOXYCYCLINE HYCLATE 100 MG: 100 TABLET, COATED ORAL at 10:17

## 2019-04-25 RX ADMIN — INSULIN LISPRO 2 UNITS: 100 INJECTION, SOLUTION INTRAVENOUS; SUBCUTANEOUS at 13:12

## 2019-04-25 RX ADMIN — ALBUTEROL SULFATE 2.5 MG: 2.5 SOLUTION RESPIRATORY (INHALATION) at 13:32

## 2019-04-25 RX ADMIN — ATORVASTATIN CALCIUM 40 MG: 40 TABLET, FILM COATED ORAL at 21:18

## 2019-04-25 RX ADMIN — SPIRONOLACTONE 25 MG: 25 TABLET ORAL at 10:17

## 2019-04-25 RX ADMIN — FUROSEMIDE 40 MG: 40 TABLET ORAL at 17:50

## 2019-04-25 RX ADMIN — FAMOTIDINE 20 MG: 20 TABLET ORAL at 21:18

## 2019-04-25 RX ADMIN — QUETIAPINE FUMARATE 50 MG: 50 TABLET ORAL at 21:18

## 2019-04-25 RX ADMIN — Medication 10 ML: at 10:18

## 2019-04-25 RX ADMIN — INSULIN GLARGINE 30 UNITS: 100 INJECTION, SOLUTION SUBCUTANEOUS at 10:18

## 2019-04-25 RX ADMIN — Medication 500000 UNITS: at 10:18

## 2019-04-25 RX ADMIN — OXYCODONE HYDROCHLORIDE AND ACETAMINOPHEN 2 TABLET: 5; 325 TABLET ORAL at 18:16

## 2019-04-25 RX ADMIN — ASPIRIN 81 MG 81 MG: 81 TABLET ORAL at 08:41

## 2019-04-25 RX ADMIN — METOPROLOL TARTRATE 50 MG: 25 TABLET ORAL at 10:17

## 2019-04-25 RX ADMIN — CLOPIDOGREL 75 MG: 75 TABLET, FILM COATED ORAL at 08:41

## 2019-04-25 RX ADMIN — POTASSIUM CHLORIDE 40 MEQ: 1500 TABLET, EXTENDED RELEASE ORAL at 10:17

## 2019-04-25 RX ADMIN — LISINOPRIL 5 MG: 5 TABLET ORAL at 10:17

## 2019-04-25 RX ADMIN — FAMOTIDINE 20 MG: 20 TABLET, FILM COATED ORAL at 10:17

## 2019-04-25 RX ADMIN — DOXYCYCLINE 100 MG: 100 CAPSULE ORAL at 21:17

## 2019-04-25 RX ADMIN — INSULIN LISPRO 2 UNITS: 100 INJECTION, SOLUTION INTRAVENOUS; SUBCUTANEOUS at 17:50

## 2019-04-25 RX ADMIN — INSULIN LISPRO 4 UNITS: 100 INJECTION, SOLUTION INTRAVENOUS; SUBCUTANEOUS at 21:20

## 2019-04-25 RX ADMIN — ENOXAPARIN SODIUM 40 MG: 40 INJECTION SUBCUTANEOUS at 10:17

## 2019-04-25 RX ADMIN — Medication 500000 UNITS: at 13:17

## 2019-04-25 RX ADMIN — METOPROLOL TARTRATE 50 MG: 50 TABLET ORAL at 21:17

## 2019-04-25 RX ADMIN — FUROSEMIDE 40 MG: 40 TABLET ORAL at 10:17

## 2019-04-25 RX ADMIN — Medication 500000 UNITS: at 17:50

## 2019-04-25 RX ADMIN — POLYETHYLENE GLYCOL 3350 17 G: 17 POWDER, FOR SOLUTION ORAL at 10:18

## 2019-04-25 RX ADMIN — MULTIPLE VITAMINS W/ MINERALS TAB 1 TABLET: TAB at 10:16

## 2019-04-25 RX ADMIN — POTASSIUM CHLORIDE 40 MEQ: 1500 TABLET, EXTENDED RELEASE ORAL at 17:50

## 2019-04-25 ASSESSMENT — ENCOUNTER SYMPTOMS
SHORTNESS OF BREATH: 0
STRIDOR: 0
SHORTNESS OF BREATH: 0
ABDOMINAL PAIN: 0
NAUSEA: 0
VOMITING: 0
COUGH: 0

## 2019-04-25 ASSESSMENT — PAIN SCALES - GENERAL
PAINLEVEL_OUTOF10: 3
PAINLEVEL_OUTOF10: 7

## 2019-04-25 ASSESSMENT — PAIN - FUNCTIONAL ASSESSMENT: PAIN_FUNCTIONAL_ASSESSMENT: 0-10

## 2019-04-25 ASSESSMENT — PAIN SCALES - WONG BAKER: WONGBAKER_NUMERICALRESPONSE: 0

## 2019-04-25 NOTE — DISCHARGE INSTR - COC
Continuity of Care Form    Patient Name: Karrie Zapata   :  1965  MRN:  [id number]    Admit date:  2019  Discharge date:  ***    Code Status Order: Full Code   Advance Directives:   Advance Care Flowsheet Documentation     Date/Time Healthcare Directive Type of Healthcare Directive Copy in [address] Agent's Name Healthcare Agent's Phone Number    19 1518  No, patient does not have an advance directive for healthcare treatment -- -- -- -- --    19 1426  No, patient does not have an advance directive for healthcare treatment -- -- -- -- --    19 0939  Unknown, patient unable to respond due to medical condition -- -- -- -- --          Admitting Physician:  Thelma Dunn DO  PCP: No primary care provider on file.     Discharging Nurse: [facility] Unit/Room#: 1012/1012-01  Discharging Unit Phone Number: ***    Emergency Contact:   Extended Emergency Contact Information  Primary Emergency Contact: Mona Henry, [address] [place] [address] Phone: [phone]  Mobile Phone: [phone]  Relation: Child    Past Surgical History:  Past Surgical History:   Procedure Laterality Date    CARDIAC CATHETERIZATION  2019    MVD-Stv    CORONARY ARTERY BYPASS GRAFT  04/07/2019    x4 per Dr. Gorge Leonard, [facility] GRAFT N/A 2019    EMERGENT CPR,  CORONARY ARTERY BYPASS X4, ON PUMP ,CHEST LEFT OPEN; SWAN, ANGELA PER ANESTHESIA performed by Bonilla Mendieta MD at [address] [facility]      unsure if correct known tibial surgery    HARDWARE REMOVAL Right 2019    R Knee internal screw protruding through skin    HARDWARE REMOVAL Right 2019    HARDWARE REMOVAL RIGHT TIBIA,    RECONSTRUCTIVE REPAIR STERNAL N/A 2019    STERNAL  WOUND WASHOUT AND CLOSURE S/P CABG performed by Bonilla Mendieta MD at [address] N/A 2019    HARDWARE [address], IRRIGATION AND DEBRIDEMENT [address] performed by Jorge A Mosqueda MD at [place] [address] off roof, external fixation and internal hardware    TONSILLECTOMY         Immunization History: There is no immunization history on file for this patient.     Active Problems:  Patient Active Problem List   Diagnosis Code    Cardiac arrest ([facility] I46.9    Acute ST elevation myocardial infarction (STEMI) of anterior wall ([facility]) I21.09    Severe left ventricular systolic dysfunction H53.4    Acute respiratory failure with hypoxemia ([facility]) J96.01    Cardiogenic pulmonary edema ([facility]) I50.1    PAD (peripheral artery disease) ([facility]) I73.9    Subacute osteomyelitis of right tibia ([facility] M86.261    Exposed orthopaedic hardware ([facility] H30.214N    Metabolic encephalopathy D86.51    Abnormal CT of the head R93.0    Fever chills R50.9    Hepatitis K75.9    Acute cerebral infarction associated with systemic hypoxia or ischemia I63.89    Multi infarct state I69.30    Carotid stenosis, bilateral I65.23    Hypokalemia E87.6    Hyperglycemia R73.9    Right foot drop M21.371       Isolation/Infection:   Isolation          No Isolation            Nurse Assessment:  Last Vital Signs: BP (!) 113/56   Pulse 71   Temp 97.6 °F (36.4 °C) (Oral)   Resp 21   Ht 5' 8\" (1.727 m)   Wt 140 lb 14 oz (63.9 kg)   SpO2 97%   BMI 21.42 kg/m²     Last documented pain score (0-10 scale): Pain Level: 0  Last Weight:   Wt Readings from Last 1 Encounters:   04/25/19 140 lb 14 oz (63.9 kg)     Mental Status:  {IP PT MENTAL STATUS:20030}    IV Access:  { ALICJA IV ACCESS:278099722}    Nursing Mobility/ADLs:  Walking   {P DME BWAD:695478410}  Transfer  {P DME RHEI:510235674}  Bathing  {P DME AEUI:862593115}  Dressing  {P DME PVPU:292783312}  Toileting  {P DME TRNB:272363809}  Feeding  {P DME VJIM:817671908}  Med Admin  {[facility] DME FFDC:353723484}  Med Delivery   { ALICJA MED Delivery:193972664}    Wound Care Documentation and Therapy:  Wound 04/07/19 Knee Right (Active)   Wound Other 4/24/2019  8:30 AM   Dressing Status Clean;Dry; Intact 4/25/2019 12:00 PM   Dressing Changed Changed/New 4/25/2019  8:10 AM   Dressing/Treatment Dry Dressing; Ace Wrap 4/25/2019 12:00 PM   Wound Cleansed Rinsed/Irrigated with saline 4/22/2019  3:15 AM   Dressing Change Due 04/25/19 4/25/2019 12:00 PM   Wound Assessment OTONIEL 4/25/2019 12:00 PM   Drainage Amount None 4/25/2019 12:00 PM   Drainage Description Serosanguinous 4/25/2019  8:10 AM   Odor None 4/25/2019 12:00 PM   Margins OTONIEL 4/25/2019 12:00 PM   Shana-wound Assessment OTONIEL 4/25/2019 12:00 PM   Culture Taken No 4/22/2019  3:15 AM   Number of days: 17       Wound 04/09/19 Coccyx Inner small slit of purple on inner coccyx (Active)   Wound Pressure Stage  2 4/12/2019  8:00 PM   Dressing Status Clean;Dry 4/13/2019  4:00 AM   Dressing Changed Changed/New 4/11/2019  4:00 PM   Dressing/Treatment Foam 4/13/2019  4:00 AM   Dressing Change Due 04/14/19 4/11/2019  4:00 AM   Wound Length (cm) 2 cm 4/9/2019 12:18 PM   Wound Assessment Purple 4/13/2019  4:00 AM   Drainage Amount None 4/13/2019  4:00 AM   Odor None 4/13/2019  4:00 AM   Shana-wound Assessment Clean;Dry;Ecchymosis; Red 4/13/2019  4:00 AM   Purple%Wound Bed 100 4/9/2019  4:52 PM   Number of days: 16       Wound 04/12/19 Heel Right dark purple area  (Active)   Wound Pressure Unstageable 4/23/2019  8:00 AM   Dressing Status Other (Comment) 4/23/2019  4:00 AM   Dressing Changed Changed/New 4/20/2019  4:00 AM   Dressing/Treatment Open to air 4/25/2019 12:00 PM   Dressing Change Due 04/29/19 4/25/2019  3:40 AM   Wound Assessment Clean;Dry; Intact; Purple; Swelling 4/25/2019 12:00 PM   Drainage Amount None 4/25/2019 12:00 PM   Odor None 4/17/2019  4:00 PM   Shana-wound Assessment Clean;Dry; Intact; Purple 4/25/2019 12:00 PM   Culture Taken No 4/21/2019  9:40 AM   Number of days: 12       Wound 04/13/19 Buttocks purple area to buttock and coccyx (Active)   Wound Pressure Unstageable liquid thickness:02469}  Daily Fluid Restriction: {CHP DME Yes amt example:218449020}  Last Modified Barium Swallow with Video (Video Swallowing Test): {Done Not Done LDBF:915255905}    Treatments at the Time of Hospital Discharge:   Respiratory Treatments: ***  Oxygen Therapy:  {Therapy; copd oxygen:46199}  Ventilator:    { CC Vent PZPC:114215406}    Rehab Therapies: {THERAPEUTIC INTERVENTION:6344435270}  Weight Bearing Status/Restrictions: { CC Weight Bearin}  Other Medical Equipment (for information only, NOT a DME order):  {EQUIPMENT:408859510}  Other Treatments: ***    Patient's personal belongings (please select all that are sent with patient):  {P DME Belongings:773653327}    RN SIGNATURE:  {Esignature:121463653}    CASE MANAGEMENT/SOCIAL WORK SECTION    Inpatient Status Date: ***    Readmission Risk Assessment Score:  Readmission Risk              Risk of Unplanned Readmission:        29           Discharging to Facility/ Agency   · Name:   · Address:  · Phone:  · Fax:    Dialysis Facility (if applicable)   · Name:  · Address:  · Dialysis Schedule:  · Phone:  · Fax:    / signature: {Esignature:215776026}  1. Skilled nursing cardio/pulmonary assessment EVERY shift with vital signs and SpO2  call abnormal results to MD   2. Monitor surgical incisions for signs of infection (redness, warmth, pain, purulent  drainage, odor) and call with abnormal findings. 3. Notify MD for temp >101.5 F  4. Daily weight and record. Call for weight gain of 3 lbs in 3 days or 5-7lbs in 7 days  5. Shower daily with warm water and hibiclens soap. 6. Physical therapy evaluation on admission, plus ambulation 3x per day in addition to  PT/OT  7. Occupational therapy evaluation on admission  8. Up in chair for all meals  9. Arrange for transportation to all appointments  10.  Reinforce Cardiac and Diabetic*** Diet, medication compliance and activity  instructions, and fluid restrictions (if there are orders for one)  11. Sternal Precautions: No lifting/pushing/pulling over 5lbs x 4 weeks, may increase  5-10lbs per week after that as tolerated. 12. Heart Hugger and Surgical bra on during daytime, off at night  13. Carlos hose on during day time,off at night. Rinse and dry overnight to prevent  infection of leg incisions  14. If any questions or problems, or if the patient needs to go to the ED, please call  cardiothoracic surgeon on call first [phone]. PHYSICIAN SECTION    Prognosis: {Prognosis:2008385478}    Condition at Discharge: [address] [place] [address] Patient Condition:971802603}    Rehab Potential (if transferring to Rehab): {Prognosis:2477002394}    Recommended Labs or Other Treatments After Discharge: ***    Physician Certification: I certify the above information and transfer of Shanell Richardson  is necessary for the continuing treatment of the diagnosis listed and that he requires {Admit to Appropriate Level of Care:18939} for {GREATER/LESS:022399473} 30 days.      Update Admission H&P: {CHP DME Changes in SVPDP:485204186}    PHYSICIAN SIGNATURE:  {Esignature:432947928}

## 2019-04-25 NOTE — CARE COORDINATION
Met with patient son Lito Noland, who brought patient's birth certificate. Copy obtained, faxed to [facility] with HELP, copy placed in patient's chart. Carotid stenting cancelled this morning. Left Vm with Amanda at [address] [place] [address] to update    1320 received call from [facility], they will be able to admit patient today if ready, will follow up    1455 notified by Antoinette Zelaya RN that interventional neurologist ok with DC to Acute Rehab. Left Vm with Amanda at [facility] to update, transportation requested from [facility] for pickup at 6pm, they are giving pickup time of 12 midnight. Spoke with Abby Russ, they can do 7:15 pm.  Made arrangements with Abby Russ, cancelled arrangements with Say Gupta. Left VM with Amanda at [facility] to notify of pickup time, await return call    [phone]  Received call from [facility] at [address] [place] [address], updated on pickup time, agreeable. Discharge readmit orders complete. Patient updated, agreeable. Patient's son Lito Noland notified over the phone, agreeable. Antoinette Zelaya RN notified, given number for report.     Discharge [address] [place] Case Management Department  Written by: Alicia Blanc RN    Patient Name: Abe Hairston  Attending Provider: Dora Luna, *  Admit Date: 2019  4:48 AM  MRN: [id number]  Account: [de-identified]                     : 1965  Discharge Date:   2019    Disposition: [address] [place] [address]    Alicia Blanc RN

## 2019-04-25 NOTE — PROGRESS NOTES
Physical Therapy  Facility/Department: [facility] 1  Daily Treatment Note  NAME: Stephan Hendricks  : 1965  MRN: [id number]    Date of Service: 2019    Discharge Recommendations:  Further therapy recommended at discharge. Patient Diagnosis(es): The primary encounter diagnosis was Acute ST elevation myocardial infarction (STEMI) of anterior wall ([facility] A diagnosis of Cardiac arrest [facility]) was also pertinent to this visit. has a past medical history of CAD (coronary artery disease), Cardiac arrest ([facility] Osteolysis, PEA (Pulseless electrical activity) ([facility] and PVD (peripheral vascular disease) ([facility] has a past surgical history that includes Cardiac catheterization (2019); Femur Surgery; Tonsillectomy; Coronary artery bypass graft (2019); Hardware Removal (Right, 2019); Tibia fracture surgery (Right); Coronary artery bypass graft (N/A, 2019); RECONSTRUCTIVE REPAIR STERNAL (N/A, 2019); Hardware Removal (Right, 2019); and REMOVE HARDWARE FEMUR (N/A, 2019). Restrictions  Restrictions/Precautions  Restrictions/Precautions: Cardiac, General Precautions, Surgical Protocols, Fall Risk  Required Braces or Orthoses?: No  Position Activity Restriction  Sternal Precautions: No Pushing, No Pulling, 5# Lifting Restrictions  Sternal Precautions: Emergent CABG X4  Other position/activity restrictions: Fall Risk  Subjective   General  Response To Previous Treatment: Patient with no complaints from previous session.   Family / Caregiver Present: No  Subjective  Subjective: RN and pt agreed to PT, pt awake in bed upon arrival  General Comment  Comments: per RN pt's angio was cancelled this am  Pain Screening  Patient Currently in Pain: Denies  Vital Signs  Patient Currently in Pain: Denies       Orientation  Orientation  Overall Orientation Status: Within Functional Limits  Cognition    Slightly confused but oriented x 4  Objective   Bed mobility  Rolling to Right: Minimal assistance  Supine to Sit: Minimal assistance  Scooting: Minimal assistance  Transfers  Sit to Stand: Moderate Assistance;Minimal Assistance (vc's for proper hand placement)  Stand to sit: Minimal Assistance  Stand Pivot Transfers: Moderate Assistance(w/RW)  Ambulation 1  Device: Rolling Walker  Assistance: Moderate assistance;Minimal assistance  Quality of Gait: WBAT Right LE, Flexed posture, slow pace,  Intermittent R lateral lean, RLE foot drop but pt able to clear floor, step to gait L  Distance: 30ft, 60ft   Comments: Phoebe for forward ambulation and modA for directional changes     Balance  Posture: Fair  Sitting - Static: Good  Sitting - Dynamic: Fair;+  Standing - Static: Fair;-  Standing - Dynamic: Fair;-  Comments: pt sat EOB x 5mins close SBA  Other exercises  Upper extremity exercises: Bicep curl, shoulder flexion/extension, punches, tricep curl, shoulder abduction/adduction, shoulder shrugs, shoulder rolls. Reps: x 10-15 w/in sternal precautions         Assessment   Body structures, Functions, Activity limitations: Decreased functional mobility ; Decreased strength;Decreased endurance;Decreased coordination;Decreased safe awareness;Decreased cognition;Decreased ROM; Decreased balance  Assessment: pt able to amb 60ft w/RW Phoebe/modA, pt not safe to ambulate alone at this time and is a high fall risk; pt also displays slight decreased cognition/confusion at this time  Prognosis: Good  Patient Education: safe gait and transfers  REQUIRES PT FOLLOW UP: Yes  Activity Tolerance  Activity Tolerance: Patient limited by endurance; Patient limited by fatigue             Goals  Short term goals  Time Frame for Short term goals: 14 visits  Short term goal 1: Pt to transfer sit to stand with min A + 1. Short term goal 2: Pt to ambulate with least restrictive AD 40ft with no LOB. Short term goal 3: Pt to demonstrate good sitting balance.    Short term goal 4: Pt to tolerate 20-30 mins ther ex/act for improved strength and endurance for ADL's. Patient Goals   Patient goals : none stated    Plan    Plan  Times per week: 7 x week BID  Times per day: Twice a day  Current Treatment Recommendations: ROM, Strengthening, Functional Mobility Training, Endurance Training, Transfer Training, Gait Training, Stair training, Balance Training, Neuromuscular Re-education, Home Exercise Program, Safety Education & Training  Safety Devices  Type of devices:  All fall risk precautions in place, Call light within reach, Nurse notified, Gait belt, Chair alarm in place, Left in chair, Telesitter in use  Restraints  Initially in place: No  Restraints:  chair alarm     Therapy Time   Individual Concurrent Group Co-treatment   Time In 1505         Time Out 1535         Minutes [facility]

## 2019-04-25 NOTE — PROGRESS NOTES
Dr. Ruiz Licona at bedside to assess patient.  Procedure canceled Problem: Patient Care Overview (Adult)  Goal: Plan of Care Review  Outcome: Ongoing (interventions implemented as appropriate)    02/09/17 0920   Coping/Psychosocial Response Interventions   Plan Of Care Reviewed With patient   Patient Care Overview   Progress progress toward functional goals as expected   Outcome Evaluation   Outcome Summary/Follow up Plan No PT goals met this tx. Pt tolerated gt well (150 ft) w/no AD CGA. Pt would benefit from HH PT to regain addtitional strength and endurance.       Goal: Discharge Needs Assessment  Outcome: Ongoing (interventions implemented as appropriate)    02/09/17 0920   Discharge Needs Assessment   Concerns To Be Addressed discharge planning concerns   Discharge Facility/Level Of Care Needs home with home health         Problem: Inpatient Physical Therapy  Goal: Transfer Training Goal 1 STG- PT  Outcome: Ongoing (interventions implemented as appropriate)    02/08/17 1117 02/09/17 0920   Transfer Training PT STG   Transfer Training PT STG, Date Established 02/08/17 --    Transfer Training PT STG, Time to Achieve 2 - 3 days --    Transfer Training PT STG, Activity Type all transfers --    Transfer Training PT STG, [place] Level conditional independence --    Transfer Training PT STG, Date Goal Reviewed --  02/09/17   Transfer Training PT STG, Outcome --  goal not met       Goal: Gait Training Goal STG- PT  Outcome: Ongoing (interventions implemented as appropriate)    02/08/17 1117 02/09/17 0920   Gait Training PT STG   Gait Training Goal PT STG, Date Established 02/08/17 --    Gait Training Goal PT STG, Time to Achieve 2 - 3 days --    Gait Training Goal PT STG, [place] Level conditional independence --    Gait Training Goal PT STG, Distance to Achieve 500' --    Gait Training Goal PT STG, Date Goal Reviewed --  02/09/17   Gait Training Goal PT STG, Outcome --  goal not met

## 2019-04-25 NOTE — PROGRESS NOTES
Occupational Therapy    Occupational Therapy Not Seen Note    DATE: 2019  Name: Millicent Bence  : 1965  MRN: [id number]    Patient not available for Occupational Therapy due to:    Pt discharging soon per RN, pt declined    Next Scheduled Treatment: Pt discharging from hospital    Electronically signed by TELLO Cervantes, VENANCIO Calvillo on 2019 at 4:21 PM

## 2019-04-25 NOTE — PROGRESS NOTES
Speech Language Pathology  Speech Language Pathology  [facility]    Cognitive Treatment Note    Date: 4/25/2019  Patients Name: Janiya Chisholm  MRN: [id number]  Diagnosis:   Patient Active Problem List   Diagnosis Code    Cardiac arrest [facility]) I46.9    Acute ST elevation myocardial infarction (STEMI) of anterior wall ([facility]) I21.09    Severe left ventricular systolic dysfunction M53.6    Acute respiratory failure with hypoxemia ([facility]) J96.01    Cardiogenic pulmonary edema ([facility]) I50.1    PAD (peripheral artery disease) ([facility]) I73.9    Subacute osteomyelitis of right tibia ([facility] M86.261    Exposed orthopaedic hardware ([facility] J48.926E    Metabolic encephalopathy V58.31    Abnormal CT of the head R93.0    Fever chills R50.9    Hepatitis K75.9    Acute cerebral infarction associated with systemic hypoxia or ischemia I63.89    Multi infarct state I69.30    Carotid stenosis, bilateral I65.23    Hypokalemia E87.6    Hyperglycemia R73.9    Right foot drop M21.371       Pain: 0/10    Cognitive Treatment    Treatment time: 9:47-10:13      Subjective: [] Alert [x] Cooperative     [] Confused     [] Agitated    [x] Lethargic      Objective/Assessment:    Attention: Pt more lethargic and distracted today, and would often get off topic in the middle of a task. Pt required min verbal cues to remain on task. Recall: Delayed: 1/3 increased to 3/3 with min verbal cues, 0/3 increased to 2/3 with mod-max verbal cues. Word Order - 3 words: 1/10 increased to 7/10 with mod verbal cues/repetitions. Organization: [place] - Transportation: +3 increased to +5 with min verbal cues. Things in a kitchen: +5 increased to +7 with min verbal cues. Abstract - Things that are cold: +2 increased to +5 with min verbal cues. Things that are soft: +2 increased to +5 with min verbal cues. Sequencing - Doing the dishes: 3/4 increased to 4/4 with min verbal cues.              Wrapping a

## 2019-04-25 NOTE — PROGRESS NOTES
[facility]  Daily Progress Note    Surgeon:  Dr. Mary Anne Viramontes  S/P :  Emerg Coronary artery bypass 4/7/19  Chest closure 4/8/19  EF: 45 %          Subjective:  Mr. Clarke Valles feels well today with no acute complaints. Pain is controlled. OOBTC and ambulating. Physical Exam  Vital Signs: BP (!) 154/79   Pulse 91   Temp 99.1 °F (37.3 °C) (Temporal)   Resp 20   Ht 5' 8\" (1.727 m)   Wt 140 lb 14 oz (63.9 kg)   SpO2 97%   BMI 21.42 kg/m²  O2 Flow Rate (L/min): 2 L/min   Admit Weight: Weight: 176 lb 5.9 oz (80 kg)   WEIGHTWeight: 140 lb 14 oz (63.9 kg)     General: alert and oriented to person, place and time. Up in chair, No apparent distress. Heart:Normal S1 and S2.  Regular rhythm. No murmurs, gallops, or rubs. Pacing Wires No   Lungs: clear to auscultation bilaterally  Abdomen: soft, non tender, non distended, BSx4  Extremities: negative  Wounds: clean and dry, healing appropriately.      Scheduled Meds:    QUEtiapine  50 mg Oral Nightly    furosemide  40 mg Oral BID    metoprolol tartrate  50 mg Oral BID    potassium chloride  40 mEq Oral BID WC    spironolactone  25 mg Oral Daily    insulin lispro  0-12 Units Subcutaneous TID WC    insulin lispro  0-6 Units Subcutaneous Nightly    insulin glargine  30 Units Subcutaneous Daily    lisinopril  5 mg Oral Daily    doxycycline hyclate  100 mg Oral 2 times per day    famotidine  20 mg Oral BID    enoxaparin  40 mg Subcutaneous Daily    nystatin  5 mL Oral 4x Daily    aspirin  81 mg Oral Daily    docusate  100 mg Oral Daily    dextrose  12.5 g Intravenous Once    albuterol  2.5 mg Nebulization Q6H    sodium chloride flush  10 mL Intravenous 2 times per day    sodium chloride flush  10 mL Intravenous 2 times per day    polyethylene glycol  17 g Oral Daily    chlorhexidine  15 mL Mouth/Throat BID    therapeutic multivitamin-minerals  1 tablet Oral Daily with breakfast    atorvastatin  40 mg Oral Nightly    therapy per protocol   Plan for discharge - Acute rehab possibly - soon    The above recommendations including medications and orders were discussed and agreed upon with Dr. Alla Centeno the attending on service for the cardiothoracic surgery group today.      Electronically signed by CINDY Lopez on 4/25/2019 at 8:32 AM

## 2019-04-25 NOTE — ANESTHESIA PRE PROCEDURE
Department of Anesthesiology  Preprocedure Note       Name:  Michelle Gallego   Age:  46 y.o.  :  1965                                          MRN:  [id number]         Date:  2019      Surgeon: * Surgery not found *    Procedure: IR NEURO ANESTHESIA    Medications prior to admission:   Prior to Admission medications    Not on File       Current medications:    No current facility-administered medications for this visit. No current outpatient medications on file.      Facility-Administered Medications Ordered in Other Visits   Medication Dose Route Frequency Provider Last Rate Last Dose    QUEtiapine (SEROQUEL) tablet 50 mg  50 mg Oral Nightly Ulysess Goltz, PA   50 mg at 19 2226    furosemide (LASIX) tablet 40 mg  40 mg Oral BID [facility] Alison, DO   40 mg at 19 1743    metoprolol tartrate (LOPRESSOR) tablet 50 mg  50 mg Oral BID [facility] Alison, DO   50 mg at 19 2223    potassium chloride (KLOR-CON M) extended release tablet 40 mEq  40 mEq Oral BID WC Cesar Kenneyksmarianela Rosas, DO   40 mEq at 19 1807    potassium chloride (KLOR-CON M) extended release tablet 40 mEq  40 mEq Oral PRN Ford Bean MD        Or    potassium bicarb-citric acid (EFFER-K) effervescent tablet 40 mEq  40 mEq Oral PRN Ford Bean MD   40 mEq at 19 4527    Or    potassium chloride 10 mEq/100 mL IVPB (Peripheral Line)  10 mEq Intravenous PRN Ford Bean MD        potassium chloride 10 mEq/100 mL IVPB (Peripheral Line)  10 mEq Intravenous PRN Penangelito Esteban,  mL/hr at 19 0927 10 mEq at 19 0927    dextrose 5 % and 0.45 % sodium chloride infusion   Intravenous Continuous [facility] KenneyChiltonmarianela Kelleyo, DO 75 mL/hr at 19 1750      spironolactone (ALDACTONE) tablet 25 mg  25 mg Oral Daily [facility] KenneyChilton Alison, DO   25 mg at 19 1825    potassium chloride 10 mEq/100 mL IVPB (Peripheral Line)  10 mEq Intravenous PRN Pennye Esteban,  mL/hr at 04/22/19 1228 10 mEq at 04/22/19 1228    insulin lispro (HUMALOG) injection vial 0-12 Units  0-12 Units Subcutaneous TID WC Connie Chambers, DO   6 Units at 04/24/19 1743    insulin lispro (HUMALOG) injection vial 0-6 Units  0-6 Units Subcutaneous Nightly Connie Chambers, DO   3 Units at 04/24/19 2326    insulin glargine (LANTUS) injection vial 30 Units  30 Units Subcutaneous Daily Connie Chambers, DO   30 Units at 04/24/19 0940    0.45 % sodium chloride infusion   Intravenous Continuous Josias Flax Alison, DO        lisinopril (PRINIVIL;ZESTRIL) tablet 5 mg  5 mg Oral Daily Connie Chambers, DO   5 mg at 04/24/19 1971    magnesium hydroxide (MILK OF MAGNESIA) 400 MG/5ML suspension 30 mL  30 mL Oral Daily PRN Connie Chambers, DO        doxycycline hyclate (VIBRA-TABS) tablet 100 mg  100 mg Oral 2 times per day Connie Chambers, DO   100 mg at 04/24/19 2225    sodium chloride flush 0.9 % injection 10 mL  10 mL Intravenous PRN Connie Chambers, DO   10 mL at 04/15/19 0820    labetalol (NORMODYNE;TRANDATE) injection 10 mg  10 mg Intravenous Q6H PRN Connie Chambers, DO   5 mg at 04/17/19 0029    niCARdipine (CARDENE) 20 mg in 0.9 % sodium chloride 200 mL infusion  2.5 mg/hr Intravenous Continuous Connie Chambers, DO        famotidine (PEPCID) tablet 20 mg  20 mg Oral BID Josias Flax Alison, DO   20 mg at 04/24/19 2224    enoxaparin (LOVENOX) injection 40 mg  40 mg Subcutaneous Daily Josias Flax Alison, DO   40 mg at 04/24/19 5986    nystatin (MYCOSTATIN) 599947 UNIT/ML suspension 500,000 Units  5 mL Oral 4x Daily Josias Flax Alison, DO   500,000 Units at 04/24/19 2224    aspirin chewable tablet 81 mg  81 mg Oral Daily Josias Flax Alison, DO   81 mg at 04/24/19 0930    docusate (COLACE) 50 MG/5ML liquid 100 mg  100 mg Oral Daily Josias Flax Alison, DO   100 mg at 04/22/19 1232    albuterol (PROVENTIL) nebulizer solution 2.5 mg  2.5 mg Nebulization Q6H PRN Josias Flax Alison, DO   2.5 mg at 04/10/19 2315    dextrose injection 12.5 g  12.5 g Intravenous Once Mayra Hush, DO        albuterol (PROVENTIL) nebulizer solution 2.5 mg  2.5 mg Nebulization Q6H Mayra Hush, DO   2.5 mg at 04/24/19 2042    sodium chloride flush 0.9 % injection 10 mL  10 mL Intravenous 2 times per day Ibeth Hush, DO   10 mL at 04/24/19 2226    sodium chloride flush 0.9 % injection 10 mL  10 mL Intravenous PRN Mayra Hush, DO        sodium chloride flush 0.9 % injection 10 mL  10 mL Intravenous 2 times per day Ibeth Hush, DO   10 mL at 04/24/19 2225    sodium chloride flush 0.9 % injection 10 mL  10 mL Intravenous PRN Mayra Hush, DO        calcium chloride 1 g in sodium chloride 0.9 % 100 mL IVPB  1 g Intravenous PRN Mayra Hush, DO   Stopped at 04/09/19 7395    magnesium sulfate 1 g in dextrose 5% 100 mL IVPB  1 g Intravenous PRN Mayra Hush, DO   Stopped at 04/09/19 8718    potassium chloride 20 mEq/50 mL IVPB (Central Line)  20 mEq Intravenous PRN Mayra Hush, DO 50 mL/hr at 04/17/19 0949 10 mEq at 04/17/19 0949    acetaminophen (TYLENOL) tablet 650 mg  650 mg Oral Q4H PRN Mayra Hush, DO   650 mg at 04/18/19 5477    acetaminophen (TYLENOL) suppository 650 mg  650 mg Rectal Q4H PRN Mayra Hush, DO   650 mg at 04/19/19 1840    oxyCODONE-acetaminophen (PERCOCET) 5-325 MG per tablet 1 tablet  1 tablet Oral Q4H PRN Mayra Hush, DO        Or    oxyCODONE-acetaminophen (PERCOCET) 5-325 MG per tablet 2 tablet  2 tablet Oral Q4H PRN Mayra Hush, DO   2 tablet at 04/23/19 0151    diphenhydrAMINE (BENADRYL) tablet 25 mg  25 mg Oral Nightly PRN Mayra Hush, DO   25 mg at 04/24/19 2231    polyethylene glycol (GLYCOLAX) packet 17 g  17 g Oral Daily Doloris Stacynajma Kelleyo, DO   17 g at 04/21/19 5782    bisacodyl (DULCOLAX) suppository 10 mg  10 mg Rectal Daily PRN Ibeth Moreno DO        ondansetron [facility]) injection 4 mg  4 mg Intravenous Medical History:        Diagnosis Date    CAD (coronary artery disease) 04/07/2019    Cardiac arrest ([facility] 04/07/2019    Osteolysis 04/07/2019    R Knee hardware    PEA (Pulseless electrical activity) ([facility] 04/07/2019    PVD [facility]peripheral vascular disease) [facility] 04/07/2019       Past Surgical History:        Procedure Laterality Date    CARDIAC CATHETERIZATION  04/07/2019    MVD-Stv    CORONARY ARTERY BYPASS GRAFT  04/07/2019    x4 per Dr. Ashley Lal, [facility] GRAFT N/A 4/7/2019    EMERGENT CPR,  CORONARY ARTERY BYPASS X4, ON PUMP ,CHEST LEFT OPEN; SWAN, ANGELA PER ANESTHESIA performed by Ning Palacios MD at [address] [facility]      unsure if correct known tibial surgery    HARDWARE REMOVAL Right 04/07/2019    R Knee internal screw protruding through skin    HARDWARE REMOVAL Right 04/22/2019    HARDWARE REMOVAL RIGHT TIBIA,    RECONSTRUCTIVE REPAIR STERNAL N/A 4/8/2019    STERNAL  WOUND WASHOUT AND CLOSURE S/P CABG performed by Ning Palacios MD at [address] [facility] N/A 4/22/2019    HARDWARE REMOVAL RIGHT TIBIA, IRRIGATION AND DEBRIDEMENT [facility] performed by Tammy Olivares MD at [address] Right     Dg Punt off roof, external fixation and internal hardware    TONSILLECTOMY         Social History:    Social History     Tobacco Use    Smoking status: Former Smoker   Substance Use Topics    Alcohol use: Not on file                                Counseling given: Not Answered      Vital Signs (Current): There were no vitals filed for this visit.                                            BP Readings from Last 3 Encounters:   04/25/19 136/63   04/22/19 (!) 91/48   04/07/19 (!) 39/22       NPO Status:                                                                                 BMI:   Wt Readings from Last 3 Encounters:   04/25/19 140 lb 14 oz (63.9 kg)     There is no height or weight on file to calculate BMI.    CBC:   Lab Results   Component Value Date    WBC 16.6 04/25/2019    RBC 3.46 04/25/2019    HGB 10.2 04/25/2019    HCT 32.9 04/25/2019    MCV 95.1 04/25/2019    RDW 13.9 04/25/2019     04/25/2019       CMP:   Lab Results   Component Value Date     04/25/2019    K 4.1 04/25/2019     04/25/2019    CO2 26 04/25/2019    BUN 13 04/25/2019    CREATININE 0.48 04/25/2019    GFRAA >60 04/25/2019    LABGLOM >60 04/25/2019    GLUCOSE 193 04/25/2019    PROT 6.3 04/19/2019    CALCIUM 8.9 04/25/2019    BILITOT 1.01 04/19/2019    ALKPHOS 206 04/19/2019     04/19/2019     04/19/2019       POC Tests:   Recent Labs     04/24/19  2324   POCGLU 267*       Coags:   Lab Results   Component Value Date    PROTIME 12.9 04/25/2019    INR 1.2 04/25/2019    APTT 25.7 04/07/2019       HCG (If Applicable): No results found for: PREGTESTUR, PREGSERUM, HCG, HCGQUANT     ABGs: No results found for: PHART, PO2ART, BFA2TBT, WWL9VIU, BEART, G7FDKPAA     Type & Screen (If Applicable):  No results found for: LABABO, LABRH    Anesthesia Evaluation   no history of anesthetic complications:   Airway: Mallampati: II     Neck ROM: full  Mouth opening: > = 3 FB Dental:          Pulmonary:       (-) asthma, shortness of breath, sleep apnea and stridor                          ROS comment: resp failure   Cardiovascular:    (+) past MI: < 1 month, CAD:, CABG/stent:, CHF:, hyperlipidemia    (-) pacemaker and  angina        Rate: normal                 ROS comment: PAD     Neuro/Psych:   (+) CVA:,    (-) seizures            ROS comment: encephalopathy GI/Hepatic/Renal:   (+) liver disease:,      (-) GERD      ROS comment: Soft diet after swallow study. Endo/Other:    (+) blood dyscrasia::., .    (-) diabetes mellitus, hypothyroidism, hyperthyroidism               Abdominal:       Abdomen: soft. Vascular:   + PVD, aortic or cerebral, .                                        Anesthesia Plan      general     ASA 4     (Asa 4 cad)  Induction: intravenous. Anesthetic plan and risks discussed with patient. Echo 4/12/19:  Left ventricle is normal in size. Global left ventricular systolic function is mildly reduced. Calculated ejection fraction is 40. Left ventricular wall thickness is mildly increased. Grade II diastolic dysfunction  Normal valvular function        Assessment: 1. PEA Arrest (Multiple times) with ROSC in few minutes - S/P extubation( 4/19)  2. Late presentation of anteroseptal STEMI   3. Multivessel CAD s/p emergent CABG on 4/7 with LIMA-LAD, SVG-PDA,OM 1 & distal circumflex). 4. S/P sternal closure on 4/8  5. Ischemic cardiomyopathy with acute systolic CHF (EF 25% on ANGELA and TTE)  6. NSVT  7. Post op anemia stable  8. Post op thrombocytopenia resolved  9. Acute pulmonary edema s/p extubation( 4/19)  10. Sepsis resolved  11. Acute renal failure - resolved  12. Metabolic encephalopathy resolving  13. Exposed orthopedic hardware S/P removal of Protruding orthopedic nail from Rt lateral knee   14. S/P ORIF Rt proximal tibia  15. Shock Liver with AST and ALT in 3000s (AST 3077, ALT 3818) resolved  16. Gallbladder sludge with concern for cholecystitis  17. Multiple embolic acute infarcts in brain( 4/14)  18. Left internal carotid artery occlusion  19. ICU Delirium  20 Dysphagia with dental soft diet     Treatment Plan:   1 S/P extubation( 4/19). Po lasix 40 BID. lopressor dose increased to 50 BID. Lisinopril 5 mg. Aldactone 25 mg for GDMT. Hold the anti HTN if SBP<110 as pt had recent CVA.  Increased the dose of potassium to 40 BID     2 continue with rest of medications for the patient including asa, plavix and lipitor.     3 Seroquel HS for the ICU delirium         Electronically signed on 04/22/19 at 1:11 PM by:  Virgilio Mosher MD   Fellow, [phone] Nicolas Fleming MD   4/25/2019

## 2019-04-25 NOTE — PLAN OF CARE
Problem: OXYGENATION/RESPIRATORY FUNCTION  Goal: Patient will maintain patent airway  4/24/2019 2044 by Gregg Brown RCP  Outcome: Ongoing  4/24/2019 1823 by Rogena Galeazzi, RN  Outcome: Ongoing  Goal: Patient will achieve/maintain normal respiratory rate/effort  Description  Respiratory rate and effort will be within normal limits for the patient  4/24/2019 2044 by Gregg Brown RCP  Outcome: Ongoing  4/24/2019 1823 by Rogena Galeazzi, RN  Outcome: Ongoing       BRONCHOSPASM/BRONCHOCONSTRICTION     ? IMPROVE AERATION/BREATH SOUNDS  ? ADMINISTER BRONCHODILATOR THERAPY AS APPROPRIATE  ? ASSESS BREATH SOUNDS  ? IMPLEMENT AEROSOL/MDI PROTOCOL  ?    PATIENT EDUCATION AS NEEDED

## 2019-04-25 NOTE — PROGRESS NOTES
Spoke with BLAISE Valencia CM, who states pt is not having carotid stents until after ARU d/t infection of RLE hardware that was recently removed, and pt is medically ready for ARU. Khurram Bill also states that neurosurgery has cleared pt for ARU with f/u for carotid stents as outpatient after d/c from ARU. Writer requested d/c readmit be completed, DVT prophylaxis be continued, and [place] be called to [phone]. Prescreen completed and Dr Yani Hernandez notified.

## 2019-04-25 NOTE — PROGRESS NOTES
[facility] - Progress Note    Today's Date and Time: 4/25/2019, 10:29 AM    Impression :   1. CAD with cardiac arrest x 3. (on the field, in ER and in OR)  2. S/P emergent CABG x 4 on 4-7-19.   3. Open chest post CABG. 4. S/p delayed primary closure of chest incision on 4-8-19  5. Improvement in Poor cardiac function with LVEF 10%-->45%  6. Exposed orthopedic hardware Rt leg  7. S/P removal of Protruding orthopedic nail from Rt lateral knee area. Old blood and malodor. 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 8. S/P ORIF Rt proximal tibia. Non compliant with follow up. 9. Fevers  10. Pulmonary edema  11. ALMITA resolved  12. S/P removal of infected leg hardware. 4-22-19    Recommendations:     · Continue doxycycline for chronic leg osteomyelitis  · Patient had removal of infected hardware 4-22-19. Will await culture data- so far no growth      Medical Decision Making/Summary/Discussion:   · Patient admitted with chest pain and cardiac arrests x 3  · Emergent CABG x 4. S/P sternum closure on 4-8-19  · Found to have protruding Rt tibial orthopedic screw from prior ORIF of tibial fracture  · No overt infection of the leg  · Temp 103 on 4-11-19. Repeat blood cultures: No growth . Sputum with mixed oral tabitha. Vancomycin started. · ALMITA with Creatinine 0.4-->1. 19. Vancomycin d/c. Ceftaroline started. · ALMITA resolved  · HIDA scan ordered for elevated LFTs: Normal on 4-15-19  · MRI with occlusion Lt carotid artery and multiple infracts  · Extubated 4-19-19. More responsive  · Has 2 peripheral IVs, will D/C central line (12 days in)  · Rt groin puncture site had some drainage. Wound cleansed on 4-19-19  · Confusion at night 4-20.  Tried to remove tubes  · Removal of infected leg hardware 4-22-19  · Culture: No growth   Infection Control Recommendations   · [place] Precautions  · Contact Isolation     Antimicrobial Stewardship Recommendations     · Discontinuation of [FreeTextEntry1] : Patient presents to office complaining of pain running down the outside of her left leg and lower back.Also admits to a lifting and some discomfort of her 2nd  left toe.Pt admits to bike riding and dancing  for long periods of time and soon after this episode developed pain. She does have history of chronic low back pain Patient has been taking Tylenol and she has a history of Crohn's disease numbness tingling \par \par Pt will start Voltaren gel to outer thigh\par if no improvement  with foot will refer to Podiatrist\par pt has appt with spine center in 2 weeks therapy    Coordination of Outpatient Care:   · Estimated Length of IV antimicrobials: D/C 4-11-19  · Patient will need Midline Catheter Insertion:   · Patient will need PICC line Insertion:  · Patient will need: Home IV , [place],  SNF,  LTAC: TBD  · Patient will need outpatient wound care:TBD    Chief complaint/reason for consultation:   · Emergent CABG  · Protruding orthopedic nail from Rt knee      History of Present Illness:   Juanito Garcia is a 46y.o.-year-old  male who was initially admitted on 4/7/2019. Patient seen at the request of Emil Munoz. INITIAL HISTORY:     Patient apparently developed chest pain and called EMS. Had cardiac arrest at the scene, was resuscitated and brought to ER where he had second cardiac arrest. Was resuscitated once again and taken to OR for emergent CABG x 4. He had another cardiac arrest in OR. Has survived the above and is back in the ICU. Found to have area of malodor on lateral aspect Rt knee, corresponding to a protruding orthopedic screw from a remote ORIF of the Rt  Tibial plateau. The screw has been removed and sent for culture. X rays show healed fracture of the proximal tibia. There is additional hardware in place that will likely need to be removed if patient survives. CURRENT EVALUATION : 4/25/2019    Patient seen and examined this morning. Stable  Laying in bed. Conversant. Says he is feeling better everyday. Carotid stenting was planned for today. Was cancelled due to right foot drop and weakness and concern for worsening of right knee infection. Orthopedics paged, they recommended orthotics for foot drop. On exam joint is not swollen, no effusion, no erythema. There is weakness to dorsiflexion and plantarflexion of the right lower ankle. Patient seems to report that the right foot weakness has been present chronically, before this admission. Surgical cultures still with no growth.      Had leg hardware removed on 4-22-19  Site clean. Some swelling and pain present  Hardware material: No growth    Clinically showing overall improvement. On nasal 02    Labs reviewed: 4/25/2019    CT of head 4-13-19 showed scattered infarcts of different ages. MRI follow up 4/14 showed multiple new embolic strokes. Absent flow Lt carotid artery. Ultrasound 4-15-19 with occlusion of Lt internal carotid artery. CXR 4-15-19: Bilateral effusions, parahilar and lower zone airspace opacities. Retrocardiac consolidation. Changes are those of CHF. Na 149 -> 150 -> 135 > 142 > 139   K 3.5 -> 3.4 > 3.8 > 4.1  WBCs 17.5 -> 25.1 -> 30 0> 20.1 -> 18.8-->17.7 > 18 > 16.6 > 16.6  Hgb 9.1. -->8.7 -> 9.4 -->8.4 > 9.1 10.2    Echocardiogram showed mildly reduced ejection fraction. No clot visualized. Cultures:  Wound:  · Orthopedic screw: Leg screw cultures found. Direct exam showed many gram + cocci in clusters and few gram - rods, NGTD. Lab reports quality of sample is questioned and to interpret results carefully. · Hardware: 4-2-19: No growth   Blood:  · Blood cultures 4/11 x 2 with no growth to date. Urine:  · Urine culture 4/12 with no growth to date. Sputum  · Sputum culture 4/12 with mixed bacterial morphotypes. Discussed with RN, family. I have personally reviewed the past medical history, past surgical history, medications, social history, and family history, and I have updated the database accordingly.   Past Medical History:     Past Medical History:   Diagnosis Date    CAD (coronary artery disease) 04/07/2019    Cardiac arrest ([facility] 04/07/2019    Osteolysis 04/07/2019    R Knee hardware    PEA (Pulseless electrical activity) ([facility] 04/07/2019    PVD (peripheral vascular disease) ([facility] 04/07/2019       Past Surgical  History:     Past Surgical History:   Procedure Laterality Date    CARDIAC CATHETERIZATION  04/07/2019    MVD-Stv    CORONARY ARTERY BYPASS GRAFT  04/07/2019    x4 per Dr. Dez Dudley, [facility] Spouse name: Not on file    Number of children: Not on file    Years of education: Not on file    Highest education level: Not on file   Occupational History    Not on file   Social Needs    Financial resource strain: Not on file    Food insecurity:     Worry: Not on file     Inability: Not on file    Transportation needs:     Medical: Not on file     Non-medical: Not on file   Tobacco Use    Smoking status: Former Smoker   Substance and Sexual Activity    Alcohol use: Not on file    Drug use: Not on file    Sexual activity: Not on file   Lifestyle    Physical activity:     Days per week: Not on file     Minutes per session: Not on file    Stress: Not on file   Relationships    Social connections:     Talks on phone: Not on file     Gets together: Not on file     Attends Hindu service: Not on file     Active member of club or organization: Not on file     Attends meetings of clubs or organizations: Not on file     Relationship status: Not on file    Intimate partner violence:     Fear of current or ex partner: Not on file     Emotionally abused: Not on file     Physically abused: Not on file     Forced sexual activity: Not on file   Other Topics Concern    Not on file   Social History Narrative    Not on file       Family History:   History reviewed. No pertinent family history. Allergies:   Patient has no known allergies. Review of Systems:   Unable to provide. Sedated on ventilator.     Physical Examination :     Patient Vitals for the past 8 hrs:   BP Temp Temp src Pulse Resp SpO2 Height Weight   04/25/19 0818 (!) 154/79 99.1 °F (37.3 °C) Temporal 91 20 97 % -- --   04/25/19 0749 129/78 98.4 °F (36.9 °C) Oral 88 21 95 % -- --   04/25/19 0514 -- -- -- -- -- -- 5' 8\" (1.727 m) 140 lb 14 oz (63.9 kg)   04/25/19 0415 136/63 98.6 °F (37 °C) Oral 90 22 90 % -- --     General Appearance: Extubated  Head:  Normocephalic, no trauma  Eyes: Pupils equal, round, reactive to light; sclera anicteric; conjunctivae pink. No embolic phenomena. ENT: Oropharynx clear, without erythema, exudate, or thrush. No tenderness of sinuses. Mouth/throat: mucosa pink and moist. No lesions. Dentition in good repair. Neck:Supple, without lymphadenopathy. Thyroid normal, No bruits. Pulmonary/Chest: Coarse sounds  Cardiovascular: Regular rate and rhythm without murmurs, rubs, or gallops. Abdomen: Soft, non tender. Bowel sounds normal. No organomegaly  All four Extremities: No cyanosis, clubbing, edema, or effusions. Neurologic: Intermittently awake, responds appropriately at times. Skin: Warm and dry with good turgor. Signs of peripheral arterial insufficiency. Rt lateral knee with open incision. Chest incision closed. Medical Decision Making -Laboratory:   I have independently reviewed/ordered the following labs:    CBC with Differential:   Recent Labs     04/24/19  0421 04/24/19  1144 04/25/19  0439   WBC 18.0*  --  16.6*   HGB 9.1*  --  10.2*   HCT 30.4*  --  32.9*   * 667* 733*     BMP:   Recent Labs     04/24/19  0421 04/25/19  0439    139   K 3.8 4.1    102   CO2 25 26   BUN 9 13   CREATININE 0.43* 0.48*   MG 1.9 1.9     Hepatic Function Panel:   No results for input(s): PROT, LABALBU, BILIDIR, IBILI, BILITOT, ALKPHOS, ALT, AST in the last 72 hours. No results for input(s): RPR in the last 72 hours. No results for input(s): HIV in the last 72 hours. No results for input(s): BC in the last 72 hours.   Lab Results   Component Value Date    MUCUS NOT REPORTED 04/14/2019    RBC 3.46 04/25/2019    TRICHOMONAS NOT REPORTED 04/14/2019    WBC 16.6 04/25/2019    YEAST NOT REPORTED 04/14/2019    TURBIDITY CLOUDY 04/14/2019     Lab Results   Component Value Date    CREATININE 0.48 04/25/2019    GLUCOSE 193 04/25/2019       Medical Decision Making-Imaging:     EXAMINATION:   3 XRAY VIEWS OF THE RIGHT KNEE       4/7/2019 5:00 pm       COMPARISON:   04/07/2019       HISTORY:   ORDERING SYSTEM PROVIDED HISTORY: s/p Hardware removal at bedside   TECHNOLOGIST PROVIDED HISTORY:   s/p Hardware removal at bedside       FINDINGS:   Interval removal of anterior superior fixating screw.  No periprosthetic   fracture.  Chronic deformity of the proximal fibula.  Degenerative changes of   knee and tib-fib syndesmosis.         Impression   Removal of anterior-superior fixating screw without interval change otherwise     EXAMINATION:   3 XRAY VIEWS OF THE RIGHT KNEE       4/7/2019 3:28 pm       COMPARISON:   None.       HISTORY:   ORDERING SYSTEM PROVIDED HISTORY: Trauma/Fracture   TECHNOLOGIST PROVIDED HISTORY:   Trauma/Fracture       FINDINGS:   Sideplate and 4 screws transfix the proximal tibia laterally.  Old healed   fracture proximal fibular neck and head.  Spurring lateral tibia plateau. Proximal tibiofibular syndesmosis.  Visualized femur and patella normal.   Soft tissues unremarkable.           Impression   ORIF proximal tibia.  No acute fracture.         EXAMINATION:   SINGLE XRAY VIEW OF THE CHEST       4/10/2019 6:47 am       COMPARISON:   Chest radiograph performed 04/09/2019.       HISTORY:   ORDERING SYSTEM PROVIDED HISTORY: post op   TECHNOLOGIST PROVIDED HISTORY:   post op       FINDINGS:   There is redemonstration of bilateral pulmonary congestion/edema that is   similar compared to prior.  There are bibasilar effusions. Prashanth Cerise is no   pneumothorax.  The heart is prominent.  The upper abdomen is unremarkable. The extrathoracic soft tissues are unremarkable. Prashanth Cerise is an endotracheal   tube with the tip in the midtrachea.  There is a gastric tube with the tip in   the proximal aspect of the stomach.  The right internal jugular line has been   removed.  The [place] catheter has been removed.           Impression   Cardiomegaly and bilateral pulmonary congestion/edema that is similar   compared to prior.       Bibasilar effusions and stable endotracheal tube and gastric tube.      Medical Decision Making-Other: Thank you for allowing us to participate in the care of this patient. Please call with questions. Nika Pressley     ATTESTATION:    I have discussed the case, including pertinent history and exam findings with the residents. I have seen and examined the patient and the key elements of the encounter have been performed by me. I have reviewed the laboratory data, other diagnostic studies and discussed them with the residents. I have updated the medical record where necessary. I agree with the assessment, plan and orders as documented by the resident.     Kian Henry MD.

## 2019-04-25 NOTE — PROGRESS NOTES
[facility]   Acute Inpatient Rehab Preadmission Assessment    Patient Name: Millicent Bence        MRN: [id number]    : 1965  (46 y.o.)  Gender: male     Admitted from:   [facility]  [x][facility]   [][facility]   []Outside Admission - Location:                                 [x]Initial         []Updated    Date of Onset / admission to the acute hospital:  19    Impairment group:   1.3 Stroke    Did patient have surgery? []No  [x]Yes:   Procedure performed:        [x] Left Heart Catheterization. [] Graft Angiography. [x] Left Ventriculography. [] Right Heart Catheterization. [x] Coronary Angiography. [] Aortic Valve Studies. [] PCI:                      PROCEDURES:  Emergent CPR into OR, immediate placement on bypass. The  patient had coronary bypass grafting x4:  1. LIMA to LAD, this was harvested on bypass while awaiting vein to  expedite procedure. 2.  Saphenous vein graft to PDA. 3.  Saphenous vein graft to OM sequential.  4.  Saphenous vein graft to distal circ. This was performed on-pump. Chest was left open, unable to close due to cardiac swelling and edema. [place] pack. Anesthesia, of note, left Cordis in IJ left for backup for  [facility] ECMO if necessary and no intraaortic balloon pump on account of  severe peripheral vascular disease. Currently, not absolutely  necessarily due to adequate contractility and amenable to pressors;  otherwise, we will place ECMO. If the patient deteriorates, we will  consider axillary Impella prior to ECMO consideration, however. The  patient planned to return to OR tomorrow for elective closure.     Procedure(s):  HARDWARE REMOVAL RIGHT TIBIA, I&D WITH SHARP EXCISIONAL DEBRIDEMENT OF SUBCUTANEOUS TISSUE AND BONE        Physicians: Jose Luis Garzon    Risk for clinical complications: High    Co-morbidities:     CAD (coronary artery disease) 04/07/2019    Cardiac arrest ([facility] [address] 04/07/2019    Osteolysis 04/07/2019     R Knee hardware    [facility] 04[facility]07/[facility]    PVD (peripheral vascular disease) ([facility] 04/07/2019                 Financial Information  Primary insurance:  []Medicare [] Medicare HMO  []Commercial insurance    [x]Medicaid   [] Medicaid HMO []Workers Compensation   []Personal Pay    Secondary Insurance:  []Medicare [] Medicare HMO  []Commercial insurance    []Medicaid   []Workers Compensation [x]None    Precautions:   [x]Cardiac Precautions  []Total hip precautions    []Weight Bearing status:  [x]Safety Precautions/Concerns  Sternal Precautions: No Pushing, No Pulling, 5# Lifting Restrictions      []Visually impaired   []Hard of Hearing     Isolation Precautions:         []Yes  [x]No  If Yes:  [] Droplet  []Contact []Airborne     []VRE     []MRSA     []C-diff         [] TB       [] Other:        Physiatrist:  [x]        Patients Occupation: Employed part time    Reviewed Lab and Diagnostic reports from Current Admission: Yes    Patients Prior Functional  Level: Prior Function  Lives With: Other (comment)  ADL Assistance: Independent  Homemaking Assistance: Independent  Ambulation Assistance: Independent  Transfer Assistance: Independent  Additional Comments: 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 Pt does not use any AD or DME. History of current illness:  Karrie Zapata is a 46 y.o. male admitted to [place] [address] on 4/7/2019. Patient was admitted to the hospital for dyspnea and difficulty breathing. He was found to have PEA arrest and to stay me. He underwent an emergent cardiac cath which revealed multivessel CAD with severe LV dysfunction with EF of 15% and severe hypokinesis. He underwent an emergent CABG ×4 on 4/7/2019 with closure of chest wall on 4/8/2019.   The patient had bedside screw removal of right hardware on 4/7/2019. He was treated for acute kidney injury post CABG which was improved with fluid resuscitation and pressors support. CT head revealed scattered areas of encephalomalacia in bifrontal and left parietal areas consistent with remote infarct and possible subacute infarct in the left lower parietal lobe. Patient underwent hardware removal of right tibia with I&D and sharp debridement of subcutaneous tissue and bone on 4/22/2019 by Dr. Juliette Vargas.           Current functional status for upper extremity ADLs:  UE Bathing: Minimal assistance, Setup, Verbal cueing, Increased time to complete(w/back)  UE Dressing: Moderate assistance, Setup, Verbal cueing, Increased time to complete(w/gown)    Current functional status for lower extremity ADLs:  LE Bathing: Moderate assistance, Setup, Verbal cueing, Increased time to complete(w/lower legs/feet)  LE Dressing: Maximum assistance, Setup, Verbal cueing, Increased time to complete(w/footies)    Current functional status for bed, chair, wheelchair transfers:  Transfers  Sit to Stand: Moderate Assistance  Stand to sit: Moderate Assistance  Squat Pivot Transfers: Maximum Assistance  Comment: Pt not able to assist much with SPT secondary to LE and ankle/foot weakness. Current functional status for toilet transfers:  Maximum assistance           Current functional status for locomotion:  Ambulation  Ambulation?: Yes  Ambulation 1  Surface: level tile  Device: [address]: Moderate assistance  Quality of Gait: WBAT Right LE, Flexed posture  Distance: Pt stood at walker and was not able to advance left LE. Stood x90 seconds. attemtped marching in place. Pt not able to clear foot from floor.   Comments: Posterior LOB x2    Current functional status for comprehension: Minimal contact assistance    Current functional status for expression: Minimal contact assistance    Current functional status for social interaction: Minimal contact assistance    Current functional status for problem solving: Minimal contact assistance    Current functional status for memory: Minimal contact assistance    Current Deficits R/T Impairment: Impaired Functional Mobility and Decreased ADLs    Required Therapy:   [x] Physical Therapy  [x] Occupational Therapy   [x] Speech Therapy    Additional Services:  [x]   [x] Recreational Therapy  [x] Nutrition             [] Prosthetist/Orthotist  [] Dialysis  [] Other: n/a    Rehab Justification:  Needs 3 hrs therapy per day or 15 hours per week:  Yes  Identified Rehab Nursing needs: Yes  Intense Interdisciplinary need:  Yes  Need for 24 hr physician supervision:  Yes  Measurable improved quality of life:  Yes  Willingness to participate:  Yes  Medical Necessity:  Yes  Patient able to tolerate care proposed:  Yes    Expected Discharge Destination/Functional Level:  Home with assist     Expected length of time to achieve that level of improvement: 1-2 weeks  Expected Post Discharge Treatments: Home Care     Other information relevant to the care needs:  n/a    Acute Inpatient Rehabilitation Disclosure Statement provided to patient. Patient verbalized understanding. Copy placed on patient's light chart. I have reviewed and concur with the findings and results of the pre-admission screening assessment completed by the Inpatient Rehabilitation Admissions Coordinator.

## 2019-04-25 NOTE — PROGRESS NOTES
[address]    Progress Note    4/25/2019    5:18 PM    Name:   Mychal Beach  MRN:     [id number]     Acct:      [de-identified]   Room:   [address] [place] [address] Day:  25  Admit Date:  4/7/2019  4:48 AM    PCP:   No primary care provider on file. Code Status:  Full Code    Subjective:     C/C:   Chief Complaint   Patient presents with    Cardiac Arrest     diff breather, brought in on cpap with elevations in EKG on EMS arrival.      Interval History Status:  Carotid stenting canceled  Symptomatically stable  Awaiting transfer to [facility]    Database updates:  Platelets [id number]   WBC 16. 6High k/uL RBC 3.46Low m/uL Hemoglobin 10.2Low g/dL Hematocrit 32.9Low     Glucose 193High mg/dL     BUN 13 mg/dL   CREATININE 0.48Low     Procalcitonin 0. 20High       Brief History:     As documented in the medical record: \"The patient is a 46 y.o. male who is admitted for  cardiac arrest and STEMI. Patient was admitted on 4/7/19 after he was brought in by EMS for dyspnea, acute respiratory failure. Patient was placed on CPAP and nebulization treatment. Initial EKG showed possible anterior septal STEMI. On arrival to [facility] emergency room patient had PEA arrest and was resuscitated per ACS protocol with 3 rounds of CPR and epinephrine with successful ROSC. Patient underwent emergent cardiac cath and showed multivessel CAD with severe LV dysfunction with EF 15% and severe hypokinesis. Patient had emergent CABG x 4 on 4/7/19 with closure of chest wall on 4/8/19. Velora Sis Patient developed acute kidney injury post-CAB with creatinine peaking at 1.99 which improved after fluid resuscitation and pressor support. Patient was taken off pressor support. Patient was asked. On 4/10/19. Patient had altered mental status upon weaning from  sedatives .   CT head showed scattered areas of encephalomalacia in bifrontal and left parietal areas consistent with remote infarct and Pulse 71   Temp 97.6 °F (36.4 °C) (Oral)   Resp 21   Ht 5' 8\" (1.727 m)   Wt 140 lb 14 oz (63.9 kg)   SpO2 97%   BMI 21.42 kg/m²   Temp (24hrs), Av.3 °F (36.8 °C), Min:97.6 °F (36.4 °C), Max:99.1 °F (37.3 °C)    Recent Labs     19  2324 19  0749 19  1151 19  1641   POCGLU 267* 175* 164* 192*       Physical Exam   Constitutional: No distress. HENT:   Head: Normocephalic. Nose: Nose normal.   Eyes: Conjunctivae are normal. No scleral icterus. Neck: Neck supple. No tracheal deviation present. Cardiovascular: Normal rate and regular rhythm. Pulmonary/Chest: Effort normal and breath sounds normal. No respiratory distress. He has no wheezes. He has no rales. He exhibits no tenderness. Abdominal: Soft. Bowel sounds are normal. He exhibits no distension. There is no tenderness. Musculoskeletal: He exhibits no edema or tenderness. His right foot drop brace is in place   Neurological: He is alert. Having trouble with historical data  The patient does have right foot drop   Skin: Skin is warm and dry. He is not diaphoretic. Wounds clean and dry   Vitals reviewed. Data:     I/O (24Hr):     Intake/Output Summary (Last 24 hours) at 2019 1718  Last data filed at 2019 1226  Gross per 24 hour   Intake --   Output 1100 ml   Net -1100 ml       Labs:    Hematology:  Recent Labs     19  0431 19  0421 19  1144 19  043   WBC 17.7* 18.0*  --  16.6*   HGB 8.4* 9.1*  --  10.2*   HCT 28.8* 30.4*  --  32.9*   * 620* 667* 733*   INR 1.3 1.4  --  1.2     Chemistry:  Recent Labs     19  0431 19  1152 19  0421 19  043     --  142 139   K 3.4* 4.1 3.8 4.1     --  105 102   CO2 25  --  25 26   GLUCOSE 136*  --  92 193*   BUN 10  --  9 13   CREATININE 0.45*  --  0.43* 0.48*   MG 2.0  --  1.9 1.9   CALCIUM 7.8*  --  8.5* 8.9     No results for input(s): PROT, LABALBU, LABA1C, L5RIYPT, Q9KPXND, FT4, TSH, AST, ALT, LDH, GGT, ALKPHOS, BILITOT, BILIDIR, AMMONIA, AMYLASE, LIPASE, LACTATE, CHOL, TRIG, HDL, LDLCALC, LDLDIRECT, LABVLDL, BNP, TROPONINI, CKTOTAL, CKMB, CKMBINDEX in the last 72 hours.     Lab Results   Component Value Date/Time    SPECIAL NOT REPORTED 04/22/2019 04:08 PM     Lab Results   Component Value Date/Time    CULTURE NO GROWTH 3 DAYS 04/22/2019 04:08 PM       Lab Results   Component Value Date    POCPH 7.451 04/19/2019    POCPCO2 44.1 04/19/2019    POCPO2 72.4 04/19/2019    POCHCO3 30.8 04/19/2019    NBEA NOT REPORTED 04/19/2019    PBEA 6 04/19/2019    JEQ7HOA 32 04/19/2019    PKER6DCG 95 04/19/2019    FIO2 40.0 04/19/2019       Radiology / Diagnostics:  See above      Assessment:        Primary Problem  Principal Problem (Resolved):    Cardiac arrest ([address]  Active Problems:    PAD (peripheral artery disease) ([address]    Subacute osteomyelitis of right tibia (HCC)    Metabolic encephalopathy    Abnormal CT of the head    Hepatitis    Acute cerebral infarction associated with systemic hypoxia or ischemia    Multi infarct state    Carotid stenosis, bilateral    Right foot drop    Thrombocytosis (HCC)  Resolved Problems:    Acute ST elevation myocardial infarction (STEMI) of anterior wall (HCC)    Severe left ventricular systolic dysfunction    Acute respiratory failure with hypoxemia (HCC)    Cardiogenic pulmonary edema (HCC)    Exposed orthopaedic hardware ([address]    Fever chills    Hypokalemia    Hyperglycemia      Plan         Monitor platelet counts - suspect reactive thrombocytosis  Optimize cardio-pulmonary function  Cardiology evaluation  Pulmonary evaluation  Cardiothoracic surgical evaluation  Antibiotics per C&S / Infectious Disease  Check bun and creatinine / renal evaluation  Neurology evaluation  Orthopedic evaluation - hardware removed right leg / tibial fracture  Wound care  Carotid angiogram and possible right ICA stent - canceled  Diet as tolerated  Seroquel  Anemia - blood products as needed  Correct electrolyte abnormalities - potassium supplementation  Physical therapy and rehabilitation    Acute rehab evaluation  Brace for right foot drop  Discharge planning in progress  Please call if we can be of any further assistance  Will sign off    IP CONSULT TO CRITICAL CARE  IP CONSULT TO [address] [place] [address] [place] CONSULT TO CARDIOLOGY  IP CONSULT TO CARDIAC REHAB  IP CONSULT TO CASE MANAGEMENT  IP CONSULT TO DIETITIAN  IP CONSULT TO SPIRITUAL SERVICES  IP CONSULT TO ORTHOPEDIC SURGERY  IP CONSULT TO INFECTIOUS DISEASES  IP CONSULT TO DIETITIAN  IP CONSULT TO IV TEAM  IP CONSULT TO NEUROLOGY  IP CONSULT TO NEPHROLOGY  IP CONSULT TO INTERNAL MEDICINE  IP CONSULT TO IV TEAM  IP CONSULT TO ENDOVASCULAR NEUROSURGERY  PALLIATIVE CARE EVAL  IP CONSULT TO IV TEAM  IP CONSULT TO PHYSICAL MEDICINE REHAB  IP CONSULT TO IV TEAM  INPATIENT CONSULT TO ORTHOTIST/PROSTHETIST    Urban Sandifer, DO    4/25/2019    5:18 PM

## 2019-04-25 NOTE — FLOWSHEET NOTE
DATE: 2019    NAME: Jimbo Green  MRN: [id number]   : 1965    Patient not seen this date for Physical Therapy due to:  [] Blood transfusion in progress  [] Hemodialysis  []  Patient Declined  [] Spine Precautions   [] Strict Bedrest  [x] Surgery/ Procedure: cerebral angio and possible LU stent     [] Testing      [] Other        [] PT being discontinued at this time. Patient independent. No further needs. [] PT being discontinued at this time as the patient has been transferred to palliative care. No further needs.     Ruba Chatman, PTA

## 2019-04-25 NOTE — PROGRESS NOTES
PULMONARY & CRITICAL CARE MEDICINE PROGRESS  NOTE     Patient:  Tracey Orellana  MRN: [id number]  Admit date: 4/7/2019    SUBJECTIVE     I personally interviewed/examined the patient, reviewed interval history and interpreted all available radiographic, laboratory data at the time of service. Chief Compliant/Reason for Initial Consult: Acute respiratory failure    Brief Hospital Course and Interval History:  Patient is a 46 y.o. male presented with cardiac arrest/STEMI, was found to have multivessel coronary artery disease and subsequently underwent emergent CABG. He was also found to have protruding screw from prior ORIF of left tibial fracture, which was subsequently removed. Hospital course has been complicated by development of acute kidney injury, transaminitis and multiple areas of ischemic stroke. He continues to be hemodynamically stable and is doing well from respiratory standpoint. He is currently on nasal cannula at 2 L/m. He denies any new complaints, no overnight issues reported.        Review of Systems:  General: negative for chills, fatigue or fever  ENT: negative for headaches, nasal congestion, sore throat or visual changes  Allergy and Immunology: negative for postnasal drip or seasonal allergies  Hematological and Lymphatic: negative for bleeding problems, swollen lymph nodes  Respiratory: positive for shortness of breath negative for cough, sputum changes or wheezing  Cardiovascular: negative for edema or palpitations  Gastrointestinal: negative for abdominal pain, change in bowel habits or nausea/vomiting  Genito-Urinary: negative for dysuria or urinary frequency/urgency  Musculoskeletal: negative for joint pain or joint swelling  Neurological: negative for numbness/tingling, seizures or weakness  Dermatological: negative for pruritus or rash    OBJECTIVE     VITAL SIGNS:   LAST-  BP (!) 113/56   Pulse 71   Temp 97.6 °F (36.4 °C) (Oral)   Resp 21   Ht 5' 8\" (1.727 m)   Wt 140 lb 14 oz (63.9 kg)   SpO2 97%   BMI 21.42 kg/m²   8-24 HR RANGE-  TEMP Temp  Av.3 °F (36.8 °C)  Min: 97.6 °F (36.4 °C)  Max: 99.1 °F (60.2 °C)   BP Systolic (56GTP), VOD:302 , Min:113 , MWO:880      Diastolic (37ZCR), LEZ:91, Min:56, Max:79     PULSE Pulse  Av.2  Min: 71  Max: 96   RR Resp  Av  Min: 21  Max: 21   O2 SAT SpO2  Av %  Min: 97 %  Max: 97 %   OXYGEN DELIVERY No data recorded     Systemic Examination:   General appearance - looks comfortable and in no acute distress  Mental status - alert, oriented to person, place, and time  Eyes - pupils equal and reactive, extraocular eye movements intact  Mouth - mucous membranes moist, pharynx normal without lesions  Neck - supple, no significant adenopathy  Chest - Chest was symmetrical without dullness to percussion. Breath sounds bilaterally were clear to auscultation. There were no wheezes, rhonchi or rales.   There is no intercostal recession or use of accessory muscles  Heart - normal rate, regular rhythm, normal S1, S2, no murmurs, rubs, clicks or gallops  Abdomen - soft, nontender, nondistended, no masses or organomegaly  Neurological - alert, oriented, normal speech, no focal findings or movement disorder noted  Extremities - peripheral pulses normal, no pedal edema, no clubbing or cyanosis  Skin - normal coloration and turgor, no rashes, no suspicious skin lesions noted     DATA REVIEW     Medications:  Scheduled Meds:   QUEtiapine  50 mg Oral Nightly    furosemide  40 mg Oral BID    metoprolol tartrate  50 mg Oral BID    potassium chloride  40 mEq Oral BID     spironolactone  25 mg Oral Daily    insulin lispro  0-12 Units Subcutaneous TID WC    insulin lispro  0-6 Units Subcutaneous Nightly    insulin glargine  30 Units Subcutaneous Daily    lisinopril  5 mg Oral Daily    doxycycline hyclate  100 mg Oral 2 times per day    famotidine  20 mg Oral BID    enoxaparin  40 mg Subcutaneous Daily    nystatin  5 mL Oral 4x Daily    aspirin  81 mg Oral Daily    docusate  100 mg Oral Daily    dextrose  12.5 g Intravenous Once    albuterol  2.5 mg Nebulization Q6H    sodium chloride flush  10 mL Intravenous 2 times per day    sodium chloride flush  10 mL Intravenous 2 times per day    polyethylene glycol  17 g Oral Daily    chlorhexidine  15 mL Mouth/Throat BID    therapeutic multivitamin-minerals  1 tablet Oral Daily with breakfast    atorvastatin  40 mg Oral Nightly    clopidogrel  75 mg Oral Daily     Continuous Infusions:   dexmedetomidine (PRECEDEX) IV infusion      dextrose 5 % and 0.45 % NaCl 75 mL/hr at 04/22/19 1750    sodium chloride      niCARdipine      dextrose       LABS:-  ABGs:   No results found for: PH, PCO2, PO2, HCO3, O2SAT  No results for input(s): PHART, PO2ART, NZK3LUN, AYE5VQL, BEART, E7MZMRRL in the last 72 hours. Lab Results   Component Value Date    POCPH 7.451 (H) 04/19/2019    POCPCO2 44.1 04/19/2019    POCPO2 72.4 (L) 04/19/2019    POCHCO3 30.8 (H) 04/19/2019    KVUE5HYW 95 04/19/2019     CBC:   Recent Labs     04/23/19  0431 04/24/19  0421 04/24/19  1144 04/25/19 0439   WBC 17.7* 18.0*  --  16.6*   HGB 8.4* 9.1*  --  10.2*   HCT 28.8* 30.4*  --  32.9*   .7 97.7  --  95.1   * 620* 667* 733*   RBC 2.86* 3.11*  --  3.46*   MCH 29.4 29.3  --  29.5   MCHC 29.2 29.9  --  31.0   RDW 13.6 13.7  --  13.9     BMP:   Recent Labs     04/23/19  0431 04/23/19  1152 04/24/19  0421 04/25/19 0439     --  142 139   K 3.4* 4.1 3.8 4.1     --  105 102   CO2 25  --  25 26   BUN 10  --  9 13   CREATININE 0.45*  --  0.43* 0.48*   GLUCOSE 136*  --  92 193*     Liver Function Test:   No results for input(s): PROT, LABALBU, ALT, AST, GGT, ALKPHOS, BILITOT in the last 72 hours. Amylase/Lipase:  No results for input(s): AMYLASE, LIPASE in the last 72 hours.   Coagulation Profile:   Recent Labs     04/23/19  0431 04/24/19  6973 04/25/19  0439   INR 1.3 1.4 1.2   PROTIME 13.3* 14.0* 12.9*     Cardiac Enzymes:  No results for input(s): CKTOTAL, CKMB, CKMBINDEX, TROPONINI in the last 72 hours. Lactic Acid:  No results found for: LACTA  BNP:   No results found for: BNP  D-Dimer:  No results found for: DDIMER  Others:   No results found for: TSH, I9OEKUH, Q0CNRNZ, THYROIDAB, FT3, T4FREE  Lab Results   Component Value Date    YOSELYN NEGATIVE 04/12/2019     No results found for: [place] Porch  No results found for: IRON, TIBC, FERRITIN  No results found for: SPEP, UPEP  No results found for: PSA, CEA, , [id number],     Input/Output:    Intake/Output Summary (Last 24 hours) at 4/25/2019 1659  Last data filed at 4/25/2019 1226  Gross per 24 hour   Intake --   Output 1100 ml   Net -1100 ml       Microbiology:    Pathology:    Chest X-ray: 4/19/19  Impression   Partial clearing at the right lung base.  Persistent left lung base opacity.         CT Scans:    Echocardiogram:4/12/19  Summary  Left ventricle is normal in size. Global left ventricular systolic function  is mildly reduced. Calculated ejection fraction is 40. Left ventricular wall thickness is mildly increased. Grade II diastolic  dysfunction  Normal valvular function    ASSESSMENT AND PLAN     Assessment:    // Acute hypoxemic respiratory failure, requiring supplemental oxygen  // Acute encephalopathy, resolved  // Resuscitated cardiac arrest/STEMI  // Multivessel coronary artery disease status post emergent CABG  // Ischemic cardiomyopathy     // Acute kidney injury, resolved  // Multiple embolic acute infarcts in the brain  // Left ICA occlusion  // Exposed orthopedic screw from previous ORIF, status post removal    Plan:    Patient remains hemodynamically stable and is currently saturating well on nasal cannula.    Continue supplemental oxygen to keep oxygen saturation greater than 92%  Continue beta blockers, ACE inhibitor, antiplatelets and diuretics as per cardiology  Continue incentive spirometry, pulmonary toilet, aspiration precautions and bronchodilators  Continue to monitor I/O with a goal of even/negative fluid balance  Continue Doxycycline as per ID  DVT and stress ulcer prophylaxis  Physical/occupational therapy; increase activity as tolerated. Ok to discharge from [facility] [address] standpoint    It was my pleasure to evaluate Andres Clements today. We will continue to follow. I would like to thank you for allowing me to participate in the care of this patient. Please feel free to call with any further questions or concerns. Era Sandifer, M.D. Pulmonary and Critical Care Medicine           4/25/2019, 4:59 PM    Please note that this chart was generated using voice recognition Dragon dictation software. Although every effort was made to ensure the accuracy of this automated transcription, some errors in transcription may have occurred.

## 2019-04-25 NOTE — PROGRESS NOTES
Report called to [facility] RN at [address]. All questions answered, gave RN number to call back should any questions arise.

## 2019-04-26 PROBLEM — E44.0 MODERATE MALNUTRITION (HCC): Chronic | Status: ACTIVE | Noted: 2019-04-26

## 2019-04-26 LAB
ABSOLUTE EOS #: 0.3 K/UL (ref 0–0.4)
ABSOLUTE IMMATURE GRANULOCYTE: ABNORMAL K/UL (ref 0–0.3)
ABSOLUTE LYMPH #: 3.3 K/UL (ref 1–4.8)
ABSOLUTE MONO #: 1 K/UL (ref 0.1–1.3)
ALBUMIN SERPL-MCNC: 2.9 G/DL (ref 3.5–5.2)
ALBUMIN/GLOBULIN RATIO: ABNORMAL (ref 1–2.5)
ALP BLD-CCNC: 169 U/L (ref 40–129)
ALT SERPL-CCNC: 129 U/L (ref 5–41)
ANION GAP SERPL CALCULATED.3IONS-SCNC: 13 MMOL/L (ref 9–17)
AST SERPL-CCNC: 53 U/L
BASOPHILS # BLD: 1 % (ref 0–2)
BASOPHILS ABSOLUTE: 0.1 K/UL (ref 0–0.2)
BILIRUB SERPL-MCNC: 0.43 MG/DL (ref 0.3–1.2)
BILIRUBIN DIRECT: 0.21 MG/DL
BILIRUBIN, INDIRECT: 0.22 MG/DL (ref 0–1)
BUN BLDV-MCNC: 15 MG/DL (ref 6–20)
BUN/CREAT BLD: ABNORMAL (ref 9–20)
CALCIUM SERPL-MCNC: 8.8 MG/DL (ref 8.6–10.4)
CHLORIDE BLD-SCNC: 96 MMOL/L (ref 98–107)
CO2: 25 MMOL/L (ref 20–31)
CREAT SERPL-MCNC: 0.57 MG/DL (ref 0.7–1.2)
DIFFERENTIAL TYPE: ABNORMAL
EOSINOPHILS RELATIVE PERCENT: 2 % (ref 0–4)
GFR AFRICAN AMERICAN: >60 ML/MIN
GFR NON-AFRICAN AMERICAN: >60 ML/MIN
GFR SERPL CREATININE-BSD FRML MDRD: ABNORMAL ML/MIN/{1.73_M2}
GFR SERPL CREATININE-BSD FRML MDRD: ABNORMAL ML/MIN/{1.73_M2}
GLOBULIN: ABNORMAL G/DL (ref 1.5–3.8)
GLUCOSE BLD-MCNC: 167 MG/DL (ref 75–110)
GLUCOSE BLD-MCNC: 181 MG/DL (ref 75–110)
GLUCOSE BLD-MCNC: 258 MG/DL (ref 75–110)
GLUCOSE BLD-MCNC: 304 MG/DL (ref 75–110)
GLUCOSE BLD-MCNC: 350 MG/DL (ref 70–99)
HCT VFR BLD CALC: 33.1 % (ref 41–53)
HEMOGLOBIN: 10.2 G/DL (ref 13.5–17.5)
IMMATURE GRANULOCYTES: ABNORMAL %
LYMPHOCYTES # BLD: 21 % (ref 24–44)
MCH RBC QN AUTO: 29.6 PG (ref 26–34)
MCHC RBC AUTO-ENTMCNC: 30.8 G/DL (ref 31–37)
MCV RBC AUTO: 96.3 FL (ref 80–100)
MONOCYTES # BLD: 7 % (ref 1–7)
NRBC AUTOMATED: ABNORMAL PER 100 WBC
PDW BLD-RTO: 14.7 % (ref 11.5–14.9)
PLATELET # BLD: 694 K/UL (ref 150–450)
PLATELET ESTIMATE: ABNORMAL
PMV BLD AUTO: 8.4 FL (ref 6–12)
POTASSIUM SERPL-SCNC: 4.5 MMOL/L (ref 3.7–5.3)
RBC # BLD: 3.44 M/UL (ref 4.5–5.9)
RBC # BLD: ABNORMAL 10*6/UL
SEG NEUTROPHILS: 69 % (ref 36–66)
SEGMENTED NEUTROPHILS ABSOLUTE COUNT: 10.7 K/UL (ref 1.3–9.1)
SODIUM BLD-SCNC: 134 MMOL/L (ref 135–144)
TOTAL PROTEIN: 6.8 G/DL (ref 6.4–8.3)
WBC # BLD: 15.5 K/UL (ref 3.5–11)
WBC # BLD: ABNORMAL 10*3/UL

## 2019-04-26 PROCEDURE — 97116 GAIT TRAINING THERAPY: CPT

## 2019-04-26 PROCEDURE — 6370000000 HC RX 637 (ALT 250 FOR IP): Performed by: NURSE PRACTITIONER

## 2019-04-26 PROCEDURE — 6370000000 HC RX 637 (ALT 250 FOR IP): Performed by: PHYSICAL MEDICINE & REHABILITATION

## 2019-04-26 PROCEDURE — 36415 COLL VENOUS BLD VENIPUNCTURE: CPT

## 2019-04-26 PROCEDURE — 97535 SELF CARE MNGMENT TRAINING: CPT

## 2019-04-26 PROCEDURE — 99222 1ST HOSP IP/OBS MODERATE 55: CPT | Performed by: INTERNAL MEDICINE

## 2019-04-26 PROCEDURE — 82947 ASSAY GLUCOSE BLOOD QUANT: CPT

## 2019-04-26 PROCEDURE — 80048 BASIC METABOLIC PNL TOTAL CA: CPT

## 2019-04-26 PROCEDURE — 97530 THERAPEUTIC ACTIVITIES: CPT

## 2019-04-26 PROCEDURE — 51798 US URINE CAPACITY MEASURE: CPT

## 2019-04-26 PROCEDURE — 99223 1ST HOSP IP/OBS HIGH 75: CPT | Performed by: PHYSICAL MEDICINE & REHABILITATION

## 2019-04-26 PROCEDURE — 6360000002 HC RX W HCPCS: Performed by: NURSE PRACTITIONER

## 2019-04-26 PROCEDURE — 92523 SPEECH SOUND LANG COMPREHEN: CPT

## 2019-04-26 PROCEDURE — 97162 PT EVAL MOD COMPLEX 30 MIN: CPT

## 2019-04-26 PROCEDURE — 97166 OT EVAL MOD COMPLEX 45 MIN: CPT

## 2019-04-26 PROCEDURE — 1180000000 HC REHAB R&B

## 2019-04-26 PROCEDURE — 1200000000 HC SEMI PRIVATE

## 2019-04-26 PROCEDURE — 85025 COMPLETE CBC W/AUTO DIFF WBC: CPT

## 2019-04-26 PROCEDURE — 80076 HEPATIC FUNCTION PANEL: CPT

## 2019-04-26 RX ORDER — OXYCODONE HYDROCHLORIDE 5 MG/1
10 TABLET ORAL EVERY 4 HOURS PRN
Status: DISCONTINUED | OUTPATIENT
Start: 2019-04-26 | End: 2019-04-28

## 2019-04-26 RX ORDER — OXYCODONE HYDROCHLORIDE 5 MG/1
5 TABLET ORAL EVERY 4 HOURS PRN
Status: DISCONTINUED | OUTPATIENT
Start: 2019-04-26 | End: 2019-05-03

## 2019-04-26 RX ADMIN — METOPROLOL TARTRATE 50 MG: 50 TABLET ORAL at 20:28

## 2019-04-26 RX ADMIN — LISINOPRIL 5 MG: 5 TABLET ORAL at 07:52

## 2019-04-26 RX ADMIN — FAMOTIDINE 20 MG: 20 TABLET ORAL at 07:52

## 2019-04-26 RX ADMIN — CLOPIDOGREL BISULFATE 75 MG: 75 TABLET ORAL at 07:51

## 2019-04-26 RX ADMIN — INSULIN LISPRO 2 UNITS: 100 INJECTION, SOLUTION INTRAVENOUS; SUBCUTANEOUS at 07:53

## 2019-04-26 RX ADMIN — FUROSEMIDE 40 MG: 40 TABLET ORAL at 16:51

## 2019-04-26 RX ADMIN — ASPIRIN 81 MG 81 MG: 81 TABLET ORAL at 07:51

## 2019-04-26 RX ADMIN — INSULIN GLARGINE 30 UNITS: 100 INJECTION, SOLUTION SUBCUTANEOUS at 07:53

## 2019-04-26 RX ADMIN — MULTIPLE VITAMINS W/ MINERALS TAB 1 TABLET: TAB at 07:51

## 2019-04-26 RX ADMIN — ATORVASTATIN CALCIUM 40 MG: 40 TABLET, FILM COATED ORAL at 20:27

## 2019-04-26 RX ADMIN — POTASSIUM CHLORIDE 40 MEQ: 1500 TABLET, EXTENDED RELEASE ORAL at 16:51

## 2019-04-26 RX ADMIN — INSULIN LISPRO 3 UNITS: 100 INJECTION, SOLUTION INTRAVENOUS; SUBCUTANEOUS at 20:30

## 2019-04-26 RX ADMIN — INSULIN LISPRO 2 UNITS: 100 INJECTION, SOLUTION INTRAVENOUS; SUBCUTANEOUS at 16:52

## 2019-04-26 RX ADMIN — ENOXAPARIN SODIUM 40 MG: 100 INJECTION SUBCUTANEOUS at 07:53

## 2019-04-26 RX ADMIN — DOXYCYCLINE 100 MG: 100 CAPSULE ORAL at 07:52

## 2019-04-26 RX ADMIN — FUROSEMIDE 40 MG: 40 TABLET ORAL at 07:52

## 2019-04-26 RX ADMIN — METOPROLOL TARTRATE 50 MG: 50 TABLET ORAL at 07:52

## 2019-04-26 RX ADMIN — QUETIAPINE FUMARATE 50 MG: 50 TABLET ORAL at 20:28

## 2019-04-26 RX ADMIN — INSULIN LISPRO 8 UNITS: 100 INJECTION, SOLUTION INTRAVENOUS; SUBCUTANEOUS at 11:50

## 2019-04-26 RX ADMIN — DOXYCYCLINE 100 MG: 100 CAPSULE ORAL at 20:28

## 2019-04-26 RX ADMIN — FAMOTIDINE 20 MG: 20 TABLET ORAL at 20:27

## 2019-04-26 RX ADMIN — SPIRONOLACTONE 25 MG: 25 TABLET, FILM COATED ORAL at 07:52

## 2019-04-26 RX ADMIN — DOCUSATE SODIUM 100 MG: 100 CAPSULE, LIQUID FILLED ORAL at 07:52

## 2019-04-26 RX ADMIN — POTASSIUM CHLORIDE 40 MEQ: 1500 TABLET, EXTENDED RELEASE ORAL at 07:51

## 2019-04-26 ASSESSMENT — ENCOUNTER SYMPTOMS
EYE DISCHARGE: 0
TROUBLE SWALLOWING: 0
SHORTNESS OF BREATH: 0
COUGH: 0
NAUSEA: 0
CHEST TIGHTNESS: 0
VOMITING: 0
EYE REDNESS: 0
ABDOMINAL PAIN: 0
CONSTIPATION: 0

## 2019-04-26 ASSESSMENT — PAIN DESCRIPTION - DESCRIPTORS: DESCRIPTORS: ACHING

## 2019-04-26 ASSESSMENT — 9 HOLE PEG TEST
TESTTIME_SECONDS: 71
TESTTIME_SECONDS: 56
TEST_RESULT: IMPAIRED
TEST_RESULT: IMPAIRED

## 2019-04-26 ASSESSMENT — PAIN DESCRIPTION - PAIN TYPE: TYPE: SURGICAL PAIN

## 2019-04-26 ASSESSMENT — PAIN DESCRIPTION - FREQUENCY: FREQUENCY: CONTINUOUS

## 2019-04-26 ASSESSMENT — PAIN SCALES - GENERAL: PAINLEVEL_OUTOF10: 4

## 2019-04-26 ASSESSMENT — PAIN DESCRIPTION - LOCATION: LOCATION: LEG

## 2019-04-26 ASSESSMENT — PAIN DESCRIPTION - ORIENTATION: ORIENTATION: RIGHT;LEFT

## 2019-04-26 NOTE — H&P
Physical Medicine & Rehabilitation History and Physical  [facility] Acute Rehabilitation Unit     Primary care provider: No primary care provider on file. Chief Complaint and Reason for Rehabilitation Admission:   Ambulatory and ADL dysfunction secondary to cardiac arrest, CABG, CVA, infected right lower extremity hardware, metabolic encephalopathy and respiratory failure    History of Present Illness:  Michelle Gallego  is a 46 y.o. right-handed single    male admitted to the [address] [place] [address] unit on 4/25/2019.       50-year-old male admitted to [address] [place] [address] on 4/7/19 with dyspnea and difficulty breathing. He was found to have PEA arrest.  He underwent emergent cardiac cath which revealed multivessel coronary artery disease with severe LV dysfunction and ejection fraction 15% with severe hypokinesis. He underwent CABG ×4 on 4/7/19 with closure of chest wall on 4/18/19. Patient also noted have bedside school removal of right hardware on 4/7/19. He was treated for acute kidney injury post CABG was improved with fluid resuscitation and pressor support. CT had revealed scattered area of encephalomalacia in bifrontal and left parietal areas consistent with remote infarct and possible subacute infarct in the left lower parietal lobe.   He underwent hardware removal of right tibia with I&D and sharp debridement of subcutaneous tissue and bone on 4/22/19 by Dr. Deepthi Ellsworth    Internal medicine-continue monitor platelets, thrombocytosis, continue wound care on Seroquel brace for right foot drop    Pulmonary-as a 4/25/19 on 2 L nasal cannula continue oxygen and bronchodilators, etc.    Infectious disease-chronic osteomyelitis-continue Doxycyline, noted hardware removal 4/22/19, cultures so far negative on the CABG ×44/7/19, delayed primary closure chest incision 4/8/19 B function improved 10-45%, extubated 4/19    CT surgeon-status post CABG emergently, cerebral angiogram and possible R ICA stent, Lovenox    Card-PEA arrest, STEMI with emergent bypass, Hold the anti HTN if SBP<110 as pt had recent CVA    Orthopedics: Need AFO and AZ AFO due to right lower strength foot drop status post right tibia hardware infection, status post bedside school removal on 4/7, irrigation debridement with hardware removal 4/22 weightbearing as tolerated continue doxycycline for osteomyelitis follow-up with orthopedics 7-10 days- Dr Lara Buford state is continue aspirin, Plavix and Lipitor CTA head and neck-left ICA left vertebral artery occluded, right ICA 60% stenosis, endovascular placement for angiogram possible right TOAN stenting     Cardiac cath 4/7/19    1. Multivessel coronary artery disease.   2. Severe LV systolic dysfunction.   3. Mild aortic insufficiency   4. Distal aorta 40% stenosis.   5. Occluded left external iliac artery.   6. Severe diffuse disease of right iliac artery. Unable to pass IABP or   Impella     CT head 4/12/19   Scattered areas of encephalomalacia, bifrontal and high left parietal areas   consistent with remote infarct.  There is hypodensity in the left lower   parietal region possibly a subacute infarct.  Chronic microvascular changes   are noted.  A punctate density is noted adjacent to the encephalomalacia in   the left frontal lobe, favoring dystrophic calcification over petechial   hemorrhage.          US renal 4/12/19   1.  No evidence of hydronephrosis.  Normal renal cortical echogenicity.       2. Partially imaged gallbladder sludge.  Apparent gallbladder wall   thickening.  Consider further evaluation with dedicated right upper quadrant   ultrasound.          CTA head and neck 4/18  Occlusion left ICA vertebral artery  extending to level skull base.       Moderate atherosclerotic changes identified involving the remainder of the   cervical arterial vasculature.       Reconstitution left carotid terminus.  Otherwise no hemodynamic stenosis or   occlusion identified involving intracranial circulation .         4/14/19 MRI brain  . Multiple acute ischemic infarcts in the right brachium pontis, right   cerebellar hemisphere, and bilateral cerebral white matter as detailed above   with distribution in multiple vascular territories suggesting central embolic   etiology. 2. No hemorrhage or mass effect. 3. Bilateral anterior frontal and high left frontoparietal encephalomalacia   consistent with sequela of prior insult. 4. Absent flow void in the left internal carotid artery consistent with   high-grade proximal stenosis or occlusion       Echo 4/12/19:  Left ventricle is normal in size. Global left ventricular systolic function is mildly reduced. Calculated ejection fraction is 40. Left ventricular wall thickness is mildly increased. Grade II diastolic dysfunction  Normal valvular function      He is currently requiring assistance for self-care activities and mobility prompting this admission. Premorbid function:  Independent      Current Function:  PT:  Restrictions/Precautions: Cardiac, General Precautions, Surgical Protocols, Fall Risk(telesitter)  Sternal Precautions: Emergent CABG X4  Other position/activity restrictions: Fall Risk   Transfers  Sit to Stand: Minimal Assistance  Stand to sit: Minimal Assistance  Bed to Chair: Minimal assistance  Comment: Pt used RW. Pt needs verbal cueing for hand placement during transfers with little carry over  Ambulation 1  Surface: level tile  Device: Rolling Walker  Other Apparatus: Wheelchair follow  Assistance: Minimal assistance  Quality of Gait: decreased ton, decreased step length, R foot drop, decreased endurance  Distance: 25,35,60 ft  Comments: Pt needed rest breaks due to weakness in R LE, sao2 ranged from 92-94% HR in mid 90s    Transfers  Sit to Stand: Minimal Assistance  Stand to sit: Minimal Assistance  Bed to Chair: Minimal assistance  Comment: Pt used RW.  Pt needs verbal cueing for hand placement during transfers with little carry over  Ambulation  Ambulation?: Yes  Ambulation 1  Surface: level tile  Device: Rolling Walker  Other Apparatus: Wheelchair follow  Assistance: Minimal assistance  Quality of Gait: decreased ton, decreased step length, R foot drop, decreased endurance  Distance: 25,35,60 ft  Comments: Pt needed rest breaks due to weakness in R LE, sao2 ranged from 92-94% HR in mid 90s    Surface: level tile  Ambulation 1  Surface: level tile  Device: Rolling Walker  Other Apparatus: Wheelchair follow  Assistance: Minimal assistance  Quality of Gait: decreased ton, decreased step length, R foot drop, decreased endurance  Distance: 25,35,60 ft  Comments: Pt needed rest breaks due to weakness in R LE, sao2 ranged from 92-94% HR in mid 90s      OT:   Pending      ST:    Cognitive Diagnosis: Cognitive evaluation revealed deficits in the areas of immediate/delayed recall, abstract reasoning (deductive/inductive), numeric reasoning, problem solving, and thought flexibility   Aphasia Diagnosis: generative naming deficits noted, mild anomia in conversation  Speech Diagnosis: no dysarthria/apraxia present              Past Medical History:      Diagnosis Date    CAD (coronary artery disease) 04/07/2019    Cardiac arrest ([facility] [address] 04/07/2019    Osteolysis 04/07/2019    R Knee hardware    [facility]Pulseless electrical activity) ([facility]) 04/07/2019    PVD (peripheral vascular disease) ([facility] [address] 04/07/2019       Past Surgical History:      Procedure Laterality Date    CARDIAC CATHETERIZATION  04/07/2019    MVD-Stv    CORONARY ARTERY BYPASS GRAFT  04/07/2019    x4 per Dr. Earl Bowen, [address] [place] [address] GRAFT N/A 4/7/2019    EMERGENT CPR,  CORONARY ARTERY BYPASS X4, ON PUMP ,CHEST LEFT OPEN; SWAN, ANGELA PER ANESTHESIA performed by Jane Smith MD at [address] [place] [address],[address]      unsure if correct known tibial surgery    HARDWARE REMOVAL Right 04/07/2019 Recent Labs     04/24/19  0421 04/25/19  0439 04/26/19  0842    139 134*   K 3.8 4.1 4.5    102 96*   CO2 25 26 25   BUN 9 13 15   CREATININE 0.43* 0.48* 0.57*     BNP: No results for input(s): BNP in the last 72 hours. PT/INR:   Recent Labs     04/24/19  0421 04/25/19 0439   PROTIME 14.0* 12.9*   INR 1.4 1.2     APTT: No results for input(s): APTT in the last 72 hours. CARDIAC ENZYMES: No results for input(s): CKMB, CKMBINDEX, TROPONINT in the last 72 hours. Invalid input(s): CKTOTAL;3  FASTING LIPID PANEL:  Lab Results   Component Value Date    CHOL 56 04/17/2019    HDL 13 (L) 04/17/2019    TRIG 107 04/17/2019     LIVER PROFILE: No results for input(s): AST, ALT, ALB, BILIDIR, BILITOT, ALKPHOS in the last 72 hours. I/O (24Hr): Intake/Output Summary (Last 24 hours) at 4/26/2019 1258  Last data filed at 4/26/2019 1144  Gross per 24 hour   Intake --   Output 108 ml   Net -108 ml       Glu last 24 hour  Recent Labs     04/25/19  1641 04/25/19  2020 04/26/19  0627 04/26/19  1033   POCGLU 192* 330* 167* 304*       No results for input(s): CLARITYU, COLORU, PHUR, SPECGRAV, PROTEINU, RBCUA, BLOODU, BACTERIA, NITRU, WBCUA, LEUKOCYTESUR, YEAST, GLUCOSEU, BILIRUBINUR in the last 72 hours. Social History:  Dwelling House - 1 story. Steps to enter:  22,  Bed/bathroom level:  Apartment. Lives with: alone  Tobacco: former smoker  Alcohol usage:  ?  Illicit: denies  Activity level:  normal activities of daily living    Family History:   No family history on file. Review of Systems:  CONSTITUTIONAL:  Denies fevers, chills, sweats or fatigue. EYES:  Denies diplopia, blind spots, blurring. HEENT:  Denies hearing loss, trouble chewing or swallowing. RESPIRATORY:  No wheezing, coughing, shortness of breath. CARDIOVASCULAR:  Denies chest pain, palpitations, lightheadedness. GASTROINTESTINAL:  Denies heartburn, nausea, constipation, diarrhea, abdominal pain.   GENITOURINARY:  No urgency, frequency, incontinence, dysuria. ENDOCRINE:  Denies hot or cold intolerance. MUSCULOSKELETAL:  Denies focal joint pain, back pain, neck pain. NEUROLOGICAL:  Denies focal weakness, numbness, tingling, balance loss, headache. BEHAVIOR/PSYCH:  Denies depression, anxiety, memory loss, insomnia. SKIN:  No ulcers, rash, bruises. Physical Exam:  BP (!) 120/52   Pulse 81   Temp 97.9 °F (36.6 °C) (Oral)   Resp 16   Ht 5' 8.11\" (1.73 m)   Wt 138 lb 12.8 oz (63 kg)   SpO2 97%   BMI 21.04 kg/m²   HEENT:  Symmetrical facial features. EOMI. Visual fields intact. Hearing intact. Speech fluent, no dystarthria. Comprehension intact. Object naming intact. Repetition impaired. Basic cognition impaired. NECK:  ROM functional.  Carotid bruit negative. THORAX:  Symmetrical.    LUNGS:  Clear to ausculation. Wheezes or crackles  HEART:  Regular. No murmurs of gallops. ABDOMEN:  Non-distended. Normal bowel sounds. No guarding, tenderness, mass. BACK:  No ulcers or deformity. EXTREMITIES:  PROM within functional limits. No calf tenderness, 2+edema. Feet warm pulses intact. NEUROMUSCULAR:  Sensation decreased in stocking distribution to light touchn,Coordination smooth. Motor testing 4/5 upper and lower extremities 4 out of 5, left lower extremity dorsiflexion 0/5 plantarflexion 3 out of 5, right lower extremity plantar and dorsiflexion 0 out of 5  SKIN: Sternal incision clean, lower extremity graft incision clean and intact, left heel blackened, right medial heel blackened though heels are intact on the sacrum stage II ulcer      Principal Diagnosis/plan:  Ambulatory and ADL dysfunction secondary to cardiac arrest, CABG, CVA, infected right lower extremity hardware, metabolic encephalopathy and respiratory failure    He will benefit from intensive interdisciplinary therapies and rehab nursing care and is appropriate for inpatient rehabilitation.   The post admission physician evaluation (BYRON) is This note is created with the assistance of a speech recognition program.  While intending to generate a document that actually reflects the content of the visit, the document can still have some errors including those of syntax and sound a like substitutions which may escape proof reading.   In such instances, actual meaning can be extrapolated by contextual diversion

## 2019-04-26 NOTE — PROGRESS NOTES
Assessment: 0-10  Pain Level: 4  Rodríguez-Baker Pain Rating: No hurt  Pain Type: Surgical pain  Pain Location: Leg  Pain Orientation: Right, Left  Pain Descriptors: Aching  Pain Frequency: Continuous    Assessment:  Cognitive Diagnosis: Cognitive evaluation revealed deficits in the areas of immediate/delayed recall, abstract reasoning (deductive/inductive), numeric reasoning, problem solving, and thought flexibility   Aphasia Diagnosis: generative naming deficits noted, mild anomia in conversation  Speech Diagnosis: no dysarthria/apraxia present         Recommendations:  Requires SLP Intervention: Yes  Duration/Frequency of Treatment: 1-2x per day  D/C Recommendations: To be determined       Plan:   Goals:  Short-term Goals  Goal 1: Pt will recall 3-5 units with and without distractions in 4/5 opportunities given minimal prompts. Goal 2: Pt will recall 6-8 units given a concrete or an abstract category in 4/5 opportunities given minimal prompts. Goal 3: Pt will complete verbal sequencing tasks with 90% accuracy given minimal prompts. Goal 4: Pt will complete task insight tasks with 90% accuracy given minimal prompts. Goal 5: Pt will complete thought flexibility tasks with 90% accuracy given minimal prompts. Goal 6: Increase pt ed re strategies to improve functional recall with returned demo at 100%   Patient/family involved in developing goals and treatment plan: yes, pt    Subjective:   Previous level of function and limitations: works as contractor, remodeling homes  General  Chart Reviewed: Yes  Patient assessed for rehabilitation services?: Yes  Additional Pertinent Hx: Pt was receiving ST services targeting cognition while at [facility] / Caregiver Present: Yes  Subjective  Subjective: Pt acknowledges difficulty with memory/problem solving but states his biggest fear is \"checking his sugar\".       Vision  Vision: Within Functional Limits  Hearing  Hearing: Within functional limits Objective:     Oral/Motor  Oral Motor: Within functional limits              Expression  Primary Mode of Expression: Verbal    Verbal Expression  Verbal Expression: Exceptions to functional limits  Convergent: Moderate  Divergent: Moderate  Interfering Components: Impaired thought organization         Motor Speech  Motor Speech: Within Functional Limits    Pragmatics/Social Functioning  Pragmatics: Within functional limits    Cognition:      Orientation  Overall Orientation Status: Within Normal Limits  Attention  Attention: Within Functional Limits  Memory  Memory: Exceptions to [facility]  Short-term Memory: Mild  Working Memory: Mild  Problem Solving  Problem Solving: Exceptions to [facility]  Simple Functional Tasks: Moderate  Verbal Reasoning Skills: Moderate  Numeric Reasoning  Numeric Reasoning: Exceptions to [facility]   Money Management: Mild  Time: Mild  Abstract Reasoning  Abstract Reasoning: Exceptions to [facility]  Convergent Thinking: Mild  Divergent Thinking: Mild  Safety/Judgement  Safety/Judgement: Exceptions to [facility]  Complex Functional Tasks: Mild  Insight: Mild    Flexibility of Thought: Mild      Prognosis:  Speech Therapy Prognosis  Prognosis: Fair  Individuals consulted  Consulted and agree with results and recommendations: Patient    Education:  Patient Education: yes  Patient Education Response: Verbalizes understanding  Safety Devices in place:  Yes            Therapy Time:   Individual Concurrent Group Co-treatment   Time In [phone]         Time Out 1105         Minutes [address] [place], [place] M.A., CCC-SLP  4/26/2019 11:34 AM

## 2019-04-26 NOTE — BRIEF OP NOTE
Brief Postoperative Note  ______________________________________________________________    Patient: Juanito Garcia  YOB: 1965  MRN: [id number]  Date of Procedure: 4/08/2019    Pre-Op Diagnosis: chest closure planned return    Post-Op Diagnosis: Same       Procedure(s):  Washout and delay closure of chest    Anesthesia: General    Surgeon: Nicole Baumann MD  Assistant: Jayne Munroe    Estimated Blood Loss (mL): 87GY    Complications: None    Specimens:   ID Type Source Tests Collected by Time Destination   1 : right leg  Tissue Leg ANAEROBIC AND AEROBIC CULTURE Marva Galarza MD 4/22/2019 2552        Implants:  Implant Name Type Inv. Item Serial No.  Lot No. LRB No. Used   PLATE      N/A 1   SCREWS       Right 3         Drains:   [REMOVED] Chest Tube 1 Anterior 28 Czech (Removed)   Suction -20 cm H2O 4/9/2019  4:00 AM   Chest Tube Airleak No 4/9/2019  4:00 AM   Drainage Description Sanguinous 4/9/2019  4:00 AM   Dressing Status Clean;Dry; Intact 4/9/2019  4:00 AM   Dressing Type Dry dressing 4/9/2019  4:00 AM   Dressing Change Due 04/09/19 4/9/2019  4:00 AM   Site Assessment Clean;Dry; Intact 4/9/2019  4:00 AM   Surrounding Skin Dry; Intact 4/9/2019  4:00 AM   Patency Intervention Tip/Tilt 4/8/2019  6:02 PM   Output (ml) 0 ml 4/9/2019  6:00 AM       [REMOVED] Chest Tube 2 Anterior 28 Czech (Removed)   Suction -20 cm H2O 4/9/2019  4:00 AM   Chest Tube Airleak No 4/9/2019  4:00 AM   Drainage Description Sanguinous 4/9/2019  4:00 AM   Dressing Status Clean;Dry; Intact 4/9/2019  4:00 AM   Dressing Type Dry dressing 4/9/2019  4:00 AM   Dressing Change Due 04/09/19 4/9/2019  4:00 AM   Site Assessment Clean;Dry; Intact 4/9/2019  4:00 AM   Surrounding Skin Dry; Intact 4/9/2019  4:00 AM   Patency Intervention Tip/Tilt 4/8/2019  6:02 PM   Output (ml) 0 ml 4/9/2019  6:00 AM       [REMOVED] Negative Pressure Wound Therapy Leg Left;Upper (Removed)   $ Standard NPWT >50 sq cm PER TX $ Yes 4/17/2019  8:00 AM   Wound Type Surgical 4/21/2019 12:30 PM   Unit Type prevena 4/19/2019  8:00 PM   Dressing Type Other (Comment) 4/21/2019 12:30 PM   Number of pieces used 1 4/16/2019  8:00 AM   Cycle Continuous 4/20/2019  4:00 PM   Target Pressure (mmHg) Other (Comment) 4/21/2019 12:30 PM   Canister changed? Yes 4/15/2019  4:00 AM   Dressing Status Clean;Dry; Intact 4/21/2019 12:30 PM   Dressing Changed Changed/New 4/20/2019 11:00 AM   Drainage Amount None 4/21/2019 12:30 PM   Drainage Description Serous 4/21/2019 12:30 PM   Dressing Change Due 04/22/19 4/21/2019 12:30 PM   Output (ml) 100 ml 4/14/2019 10:27 AM   Wound Assessment OTONIEL 4/21/2019 12:30 PM   Shana-wound Assessment OTOINEL 4/21/2019 12:30 PM   Odor None 4/21/2019 12:30 PM       [REMOVED] Negative Pressure Wound Therapy Leg Lower (Removed)   $ Standard NPWT >50 sq cm PER TX $ Yes 4/17/2019  8:00 AM   Wound Type Surgical 4/21/2019 12:30 PM   Unit Type prevena 4/19/2019  8:00 PM   Dressing Type Other (Comment) 4/21/2019 12:30 PM   Number of pieces used 1 4/16/2019  8:00 AM   Cycle Continuous 4/21/2019 12:30 PM   Target Pressure (mmHg) 125 4/21/2019 12:30 PM   Dressing Status Clean;Dry; Intact 4/21/2019 12:30 PM   Dressing Changed Changed/New 4/15/2019  4:00 PM   Drainage Amount Scant 4/21/2019 12:30 PM   Drainage Description Serous 4/21/2019 12:30 PM   Dressing Change Due 04/22/19 4/21/2019 12:30 PM   Wound Assessment OTONIEL 4/21/2019 12:30 PM   Shana-wound Assessment OTONIEL 4/21/2019 12:30 PM       [REMOVED] NG/OG/NJ/NE Tube Orogastric 12 fr Center mouth (Removed)   Surrounding Skin Intact 4/19/2019  8:00 AM   Securement device Yes 4/19/2019  8:00 AM   Status Other (Comment) 4/19/2019  8:00 AM   Placement Verified by External Catheter Length 4/19/2019  4:00 AM   NG/OG/NJ/NE External Measurement (cm) 55 cm 4/19/2019  4:00 AM   Drainage Appearance None 4/19/2019  4:00 AM   Tube Feeding Standard with Fiber 4/19/2019  8:00 AM   Tube Feeding Status Continuous 4/19/2019  8:00 AM 0 ml 4/8/2019  6:23 PM       Findings: no issues with closure edema resloved    Mayra Lopez MD  Date: 4/26/2019  Time: 12:58 PM

## 2019-04-26 NOTE — PLAN OF CARE
Problem: Risk for Impaired Skin Integrity  Goal: Tissue integrity - skin and mucous membranes  Description  Structural intactness and normal physiological function of skin and  mucous membranes. 4/26/2019 1542 by Kavitha Allen RN  Outcome: Ongoing  Note:   Pt educated on and assisted in keeping skin clean and dry this shift. Pt turned, or prompted to reposition Q2 hr and as needed. Will continue to monitor and assist as needed. Wound care consulted and in to see pt this shift to assist in determining correct treatment of LDAs. Problem: Falls - Risk of:  Goal: Will remain free from falls  Description  Will remain free from falls  4/26/2019 1542 by Kavitha Allen RN  Outcome: Ongoing  Note:   Pt educated on fall risks. Pt wearing nonskid stockings, using assistive devices appropriately, encouraged to ask for assistance as needed. Pt wearing personal alarm. Will continue to monitor and assist as needed. Problem: Pain:  Goal: Control of acute pain  Description  Control of acute pain  4/26/2019 1542 by Kavitha Allen RN  Outcome: Ongoing  Note:   Pt denies discomfort that warrants any pain medications. Writer encouraged pt to inform staff if pain medication is needed. Will continue to monitor.

## 2019-04-26 NOTE — PLAN OF CARE
Nutrition Problem: Inadequate oral intake  Intervention: Food and/or Nutrient Delivery: Continue current diet, Continue current ONS  Nutritional Goals: PO intake % of meals and supplements

## 2019-04-26 NOTE — PROGRESS NOTES
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 They stated He should be wearing this and also he has a follow up appt on May 6th at 10:30. Patient does not have heart hugger garment with him in his room.  We do not  Carry them at [facility] try to get one from [facility]'s

## 2019-04-26 NOTE — PLAN OF CARE
[address] [address] Continence Nurse  Consult Note       NAME:  Celine Yuen  MEDICAL RECORD NUMBER:  [id number]  AGE: 46 y.o. GENDER: male  : 1965  TODAY'S DATE:  2019    Subjective   Reason for WOCN Evaluation and Assessment: Multiple wounds      Celine Yuen is a 46 y.o. male referred by:   [x] Physician  [] Nursing  [] Other:     Wound Identification:  Wound Type: pressure  Contributing Factors: edema, venous stasis, decreased mobility and shear force    Wound History: Recent CABG with artery donor sites right and left leg.   Current Wound Care Treatment: N/A    Patient Goal of Care:  [x] Wound Healing  [] Odor Control  [] Palliative Care  [] Pain Control   [] Other:         PAST MEDICAL HISTORY        Diagnosis Date    CAD (coronary artery disease) 2019    Card[facility] ([facility] 2019    Osteolysis 2019    R Knee hardware    [facility]sel[facility]) [facility]    PVD (peripheral vascular disease) ([facility] 2019       PAST SURGICAL HISTORY    Past Surgical History:   Procedure Laterality Date    CARDIAC CATHETERIZATION  2019    MVD-Stv    CORONARY ARTERY BYPASS GRAFT  04/07/2019    x4 per Dr. Bipin Jackson, [address] [place] [address] GRAFT N/A 2019    EMERGENT CPR,  CORONARY ARTERY BYPASS X4, ON PUMP ,CHEST LEFT OPEN; SWAN, ANGELA PER ANESTHESIA performed by Tyler Becerra MD at [address] [facility]      unsure if correct known tibial surgery    HARDWARE REMOVAL Right 2019    R Knee internal screw protruding through skin    HARDWARE REMOVAL Right 2019    HARDWARE REMOVAL RIGHT TIBIA,    RECONSTRUCTIVE REPAIR STERNAL N/A 2019    STERNAL  WOUND WASHOUT AND CLOSURE S/P CABG performed by Tyler Becerra MD at [address] N/A 2019    HARDWARE REMOVAL RIGHT TIBIA, IRRIGATION AND DEBRIDEMENT [address] TIBIA performed by Sally Gillespie MD at [address] [facility] Darya Pantoja off roof, external fixation and internal hardware    TONSILLECTOMY         FAMILY HISTORY    No family history on file. SOCIAL HISTORY    Social History     Tobacco Use    Smoking status: Former Smoker   Substance Use Topics    Alcohol use: Not on file    Drug use: Not on file       ALLERGIES    No Known Allergies    MEDICATIONS    No current facility-administered medications on file prior to encounter. No current outpatient medications on file prior to encounter.        Objective    BP (!) 120/52   Pulse 81   Temp 97.9 °F (36.6 °C) (Oral)   Resp 16   Ht 5' 8.11\" (1.73 m)   Wt 138 lb 12.8 oz (63 kg)   SpO2 97%   BMI 21.04 kg/m²     LABS:  WBC:    Lab Results   Component Value Date    WBC 15.5 04/26/2019     H/H:    Lab Results   Component Value Date    HGB 10.2 04/26/2019    HCT 33.1 04/26/2019     PTT:    Lab Results   Component Value Date    APTT 25.7 04/07/2019   [APTT}  PT/INR:    Lab Results   Component Value Date    PROTIME 12.9 04/25/2019    INR 1.2 04/25/2019     HgBA1c:    Lab Results   Component Value Date    LABA1C 10.0 04/17/2019       Assessment   Yash Risk Score: Yash Scale Score: 17    Patient Active Problem List   Diagnosis Code    PAD (peripheral artery disease) (HCC) I73.9    Subacute osteomyelitis of right tibia (HCC) Q32.260    Metabolic encephalopathy M61.33    Abnormal CT of the head R93.0    Hepatitis K75.9    Acute cerebral infarction associated with systemic hypoxia or ischemia I63.89    Multi infarct state I69.30    Carotid stenosis, bilateral I65.23    Right foot drop M21.371    Thrombocytosis (HCC) D47.3    Acute CVA (cerebrovascular accident) ([facility] I63.9       Measurements:  Wound 04/07/19 Knee Right (Active)   Wound Image    4/26/2019  3:40 PM   Wound Non-Healing Surgical 4/26/2019  3:40 PM   Dressing Status Old drainage 4/26/2019  3:40 PM   Dressing Changed Changed/New 4/26/2019  3:40 PM   Dressing/Treatment Alginate with Ag;ABD;Other (comment) 4/26/2019  3:40 PM   Wound Cleansed Rinsed/Irrigated with saline 4/26/2019  3:40 PM   Dressing Change Due 04/27/19 4/26/2019  3:40 PM   Wound Length (cm) 0.8 cm 4/26/2019  3:40 PM   Wound Width (cm) 0.7 cm 4/26/2019  3:40 PM   Wound Depth (cm) 1.3 cm 4/26/2019  3:40 PM   Wound Surface Area (cm^2) 0.56 cm^2 4/26/2019  3:40 PM   Wound Volume (cm^3) 0.73 cm^3 4/26/2019  3:40 PM   Wound Assessment Red;Bleeding 4/26/2019  3:40 PM   Drainage Amount Moderate 4/26/2019  3:40 PM   Drainage Description Serosanguinous 4/26/2019  3:40 PM   Odor None 4/26/2019  3:40 PM   Margins Defined edges 4/26/2019  3:40 PM   Shana-wound Assessment Red; Swelling 4/26/2019  3:40 PM   Red%Wound Bed 100 4/26/2019  3:40 PM   Culture Taken No 4/22/2019  3:15 AM   Number of days: 18       Wound 04/09/19 Coccyx Inner small slit of purple on inner coccyx (Active)   Wound Pressure Stage  2 4/12/2019  8:00 PM   Dressing Status Clean;Dry 4/13/2019  4:00 AM   Dressing Changed Changed/New 4/11/2019  4:00 PM   Dressing/Treatment Foam 4/13/2019  4:00 AM   Dressing Change Due 04/14/19 4/11/2019  4:00 AM   Wound Length (cm) 2 cm 4/9/2019 12:18 PM   Wound Assessment Purple 4/13/2019  4:00 AM   Drainage Amount None 4/13/2019  4:00 AM   Odor None 4/13/2019  4:00 AM   Shana-wound Assessment Clean;Dry;Ecchymosis; Red 4/13/2019  4:00 AM   Purple%Wound Bed 100 4/9/2019  4:52 PM   Number of days: 17       Wound 04/12/19 Heel Right dark purple area  (Active)   Wound Deep tissue/Injury 4/26/2019  3:40 PM   Dressing Status Other (Comment) 4/26/2019  3:40 PM   Dressing Changed Changed/New 4/26/2019  1:51 PM   Dressing/Treatment Barrier Film 4/26/2019  3:40 PM   Dressing Change Due 04/27/19 4/26/2019  3:40 PM   Wound Length (cm) 5 cm 4/26/2019  3:40 PM   Wound Width (cm) 5.8 cm 4/26/2019  3:40 PM   Wound Depth (cm) 0 cm 4/26/2019  3:40 PM   Wound Surface Area (cm^2) 29 cm^2 4/26/2019  3:40 PM   Wound Volume (cm^3) 0 cm^3 4/26/2019  3:40 PM   Wound Assessment Clean;Dry; Intact; Purple; Swelling 4/26/2019  1:51 PM   Drainage Amount None 4/26/2019  3:40 PM   Odor None 4/26/2019  1:51 PM   Shana-wound Assessment Blanchable erythema 4/26/2019  3:40 PM   Culture Taken No 4/21/2019  9:40 AM   Number of days: 13       Wound 04/13/19 Buttocks purple area to buttock and coccyx (Active)   Wound Pressure Stage  1 4/26/2019  3:40 PM   Dressing Status Other (Comment) 4/26/2019  1:51 PM   Dressing Changed Changed/New 4/26/2019  3:40 PM   Dressing/Treatment Barrier Film 4/26/2019  3:40 PM   Wound Cleansed Other (Comment) 4/26/2019  3:40 PM   Dressing Change Due 04/25/19 4/25/2019  4:00 PM   Wound Assessment Pink; Intact;Dry 4/25/2019  7:45 PM   Drainage Amount None 4/25/2019  7:45 PM   Drainage Description Tan 4/25/2019  8:10 AM   Odor None 4/25/2019  7:45 PM   Shana-wound Assessment Red;Pink; Intact 4/25/2019  7:45 PM   Culture Taken No 4/21/2019  9:40 AM   Number of days: 13       Wound 04/14/19 Buttocks (Active)   Wound Skin Tear 4/19/2019  8:00 PM   Dressing Status Other (Comment) 4/21/2019  4:16 PM   Dressing Changed Changed/New 4/21/2019  4:00 AM   Dressing/Treatment Foam 4/21/2019  4:00 AM   Dressing Change Due 04/23/19 4/21/2019  4:00 AM   Wound Assessment Purple;Pink;Red;Dry 4/21/2019  4:16 PM   Drainage Amount None 4/21/2019  4:16 PM   Odor None 4/21/2019  4:16 PM   Shana-wound Assessment Red; Intact 4/21/2019  4:16 PM   Number of days: 12       Wound 04/26/19 Coccyx (Active)   Wound Image   4/26/2019  3:40 PM   Wound Pressure Unstageable 4/26/2019  3:40 PM   Dressing Status Dry 4/26/2019  3:40 PM   Dressing Changed Changed/New 4/26/2019  3:40 PM   Dressing/Treatment Alginate; Foam 4/26/2019  3:40 PM   Wound Cleansed Other (Comment) 4/26/2019  3:40 PM   Dressing Change Due 04/28/19 4/26/2019  3:40 PM   Wound Length (cm) 1.8 cm 4/26/2019  3:40 PM   Wound Width (cm) 0.6 cm 4/26/2019  3:40 PM   Wound Depth (cm) 0.2 cm 4/26/2019  3:40 PM   Wound Surface Area (cm^2) 1.08 cm^2 4/26/2019 3:40 PM   Wound Volume (cm^3) 0.22 cm^3 4/26/2019  3:40 PM   Wound Assessment Slough; Tan 4/26/2019  3:40 PM   Drainage Amount Scant 4/26/2019  3:40 PM   Drainage Description Yellow 4/26/2019  3:40 PM   Odor None 4/26/2019  3:40 PM   Margins Defined edges 4/26/2019  3:40 PM   Shana-wound Assessment Red 4/26/2019  3:40 PM   Yellow%Wound Bed 100 4/26/2019  3:40 PM   Number of days: 0       Wound 04/26/19 Heel Left (Active)   Wound Image   4/26/2019  3:40 PM   Wound Deep tissue/Injury 4/26/2019  3:40 PM   Dressing/Treatment Barrier Film 4/26/2019  3:40 PM   Wound Length (cm) 2 cm 4/26/2019  3:40 PM   Wound Width (cm) 7 cm 4/26/2019  3:40 PM   Wound Depth (cm) 0 cm 4/26/2019  3:40 PM   Wound Surface Area (cm^2) 14 cm^2 4/26/2019  3:40 PM   Wound Volume (cm^3) 0 cm^3 4/26/2019  3:40 PM   Wound Assessment Other (Comment) 4/26/2019  3:40 PM   Drainage Amount None 4/26/2019  3:40 PM   Number of days: 0     Incision 04/07/19 Chest (Active)   Wound Assessment OTNOIEL 4/8/2019 12:09 PM   Shana-wound Assessment OTONIEL 4/8/2019 12:09 PM   Closure Other (Comment) 4/8/2019 12:09 PM   Drainage Amount OTONIEL 4/8/2019 12:09 PM   Drainage Description Sanguinous 4/8/2019 12:09 PM   Odor None 4/8/2019 12:09 PM   Dressing/Treatment Packing 4/8/2019 12:09 PM   Dressing Status Clean;Dry; Intact 4/8/2019 12:09 PM   Number of days: 19       Incision 04/07/19 Leg Left (Active)   Wound Assessment Clean;Dry 4/26/2019  3:40 PM   Shana-wound Assessment Red; Swelling 4/26/2019  3:40 PM   Closure Steri-Strips 4/25/2019  7:45 PM   Drainage Amount None 4/26/2019  3:40 PM   Drainage Description Sanguinous 4/25/2019  8:10 AM   Odor None 4/25/2019  7:45 PM   Dressing/Treatment Open to air 4/25/2019  7:45 PM   Dressing Changed Changed/New 4/25/2019  8:10 AM   Dressing Status Clean;Dry; Intact 4/25/2019  8:10 AM   Dressing Change Due 04/22/19 4/25/2019  8:10 AM   Number of days: 19       Incision 04/08/19 Chest (Active)   Wound Assessment Clean;Dry 4/26/2019  3:40 PM Other:     Current Diet: DIET DENTAL SOFT;  Dietary Nutrition Supplements: Standard High Calorie Oral Supplement  Dietician consult:  No    Discharge Plan:  Placement for patient upon discharge: To be determined      Patient/Caregiver Teaching:  Discussed plan of care and dressing management as well as importance of offloading pressure areas and the patient's role in self-care.    Level of patient/caregiver understanding able to:   [] Indicates understanding       [x] Needs reinforcement  [] Unsuccessful      [] Verbal Understanding  [x] Demonstrated understanding       [] No evidence of learning  [] Refused teaching         [] N/A       Electronically signed by Imelda Gunter RN, Elsy Escobedo on 4/26/2019 at 3:53 PM

## 2019-04-26 NOTE — FLOWSHEET NOTE
[address]   Acute Rehabilitation Physical Therapy Evaluation     Date: 19  Patient Name: Jimbo Green       Room: 9168/6276-77  MRN: [id number]   Account: [de-identified]   : 1965  (46 y.o.)   Gender: male   Referring Practitioner: Sera Theodore MD  Diagnosis: Cardiac Arrest and stroke    Treatment Diagnosis: decreased functional mobility  Additional Pertinent Hx: Pt admitted to [place] [address] ED 19 presenting with cardiac arrest and increased SOB. Pt underwent emergent CABG x4 19 with delayed closure on 19. Pt was intubated from 19 to 19. Pt also underwent screw removal from ORIF of R knee bedside on 19. Hardware removal of R tibia and I&D on 19. Pt admitted to [address] [place] [address] for intense rehabiliation on 19. 19 MRI shows R cerebellum infarct. CT on 19 shows Lower parietal infarct. Restrictions/Precautions: Cardiac;General Precautions;Surgical Protocols; Fall Risk(telesitter, spinal precautions)  Required Braces or Orthoses?: Yes(R AFO, R procare boot)     Sternal Precautions: No Pushing; No Pulling;5# Lifting Restrictions  Sternal Precautions: Emergent CABG X4  Other position/activity restrictions: Fall Risk  Past Medical History:  has a past medical history of CAD (coronary artery disease), Cardiac arrest ([address] Osteolysis, PEA (Pulseless electrical activity) ([facility] and PVD (peripheral vascular disease) ([facility] Past Surgical History:   has a past surgical history that includes Cardiac catheterization (2019); Femur Surgery; Tonsillectomy; Coronary artery bypass graft (2019); Hardware Removal (Right, 2019); Tibia fracture surgery (Right); Coronary artery bypass graft (N/A, 2019); RECONSTRUCTIVE REPAIR STERNAL (N/A, 2019); Hardware Removal (Right, 2019); and REMOVE HARDWARE FEMUR (N/A, 2019).     Vital Signs  Patient Currently in Pain: Yes   Pain Screening  Patient Currently in Pain: Yes  Pain Assessment  Pain Assessment: 0-10  Pain Level: 4  Pain Type: Surgical pain  Pain Location: Leg  Pain Orientation: Right;Left  Pain Descriptors: Aching  Pain Frequency: Continuous     Oxygen Therapy  O2 Device: None (Room air)    Patient Observation  Observations: sitting up in chair upon entering room, B foot drop, ACE bandage on R shin. R LE swelling with redness noted     Lives With: Other (comment)(recover addicts)  Type of Home: House(Pt is a resident at a substance abuse living facility)  Home Layout: Multi-level; Laundry in basement  Home Access: Stairs to enter with rails  Entrance Stairs - Number of Steps: 22  Entrance Stairs - Rails: Right  Bathroom Shower/Tub: Walk-in shower  Bathroom Toilet: Handicap height  Bathroom Equipment: (unsure)  Bathroom Accessibility: Accessible  Home Equipment: (none)  ADL Assistance: Independent  Homemaking Assistance: Independent  Homemaking Responsibilities: Yes  Ambulation Assistance: Independent  Transfer Assistance: Independent  Active : Yes  Mode of Transportation: Truck  Occupation: Full time employment;Part time employment  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 Pt does not use any AD or DME. Overall Orientation Status: Within Functional Limits  Vision  Vision: Within Functional Limits  Hearing  Hearing: Within functional limits  Subjective: Pt agreeable to PT eval. Pt reports feeling weakness in R side versus L  Comments: Pt with noted weakness in R LE, posterior lean during sit stand transfers.  Pt sao2 ranged from 92-94% with amb and activity    Objective:   PROM RLE (degrees)  RLE PROM: WFL  RLE General PROM: Ankle DF to neutral  PROM LLE (degrees)  LLE PROM: WFL  LLE General PROM: Ankle DF to neutral  Strength RLE  Comment: hip and knee 2+/5, ankle PF 2/5, DF 0/5  Strength LLE  Comment: hip and knee 3/5, ankle PF 2/5, DF 1/5  AROM RUE (degrees)  RUE General AROM: See OT eval  AROM LUE (degrees)  LUE General AROM: See OT eval  Strength RUE  Comment: See OT eval  Strength LUE  Comment: See OT eval  Strength Other  Other: Pt with B foot drop              Overall Sensation Status: Impaired(no light touch sensation in B foot and B shin)    Bed Mobility:   Bed mobility  Rolling to Left: Stand by assistance  Rolling to Right: Stand by assistance  Supine to Sit: Minimal assistance(trunk A)  Sit to Supine: Stand by assistance  Scooting: Stand by assistance  Comment: min A needed for supine to sit due to chest tightness and pain  Bed Mobility  Scooting: Stand by assistance    Transfers:  Sit to Stand: Minimal Assistance, Moderate Assistance  Stand to sit: Minimal Assistance, Moderate Assistance  Bed to Chair: Minimal assistance, Moderate assistance  Comment: Pt used RW. Pt needs verbal cueing for hand placement during transfers with little carry over. At times pt lose balance posteriorly          Ambulation 1  Surface: level tile  Device: Rolling Walker  Other Apparatus: Wheelchair follow  Assistance: Minimal assistance  Quality of Gait: decreased ton, decreased step length, R foot drop, decreased endurance  Distance: 25,35,60 ft  Comments: Pt needed rest breaks due to weakness in R LE, sao2 ranged from 92-94% HR in mid 90s. Pt has AFO, not used this date due to swelling and redness on Lower leg, try use of AFO when appropriate  Ambulation 2  Surface - 2: level tile  Device 2: Rolling Walker  Assistance 2: Contact guard assistance  Quality of Gait 2: decreased step length, increased posterior lean when initiating stand, decreased endurance, decreased ton  Distance: 80 ft  Comments: Pt sao2 96% HR 89 post amb on room air. Pt tolerated amb well with no w/c follow.         Stairs/Curb  Stairs?: No                                                     Balance  Posture: Fair  Sitting - Static: Good  Sitting - Dynamic: Good  Standing - Static: Good;-  Standing - Dynamic: Fair;+  Comments: standing with RW  Exercises  Comments: Pt sao2 97%, HR 85 bpm post exercise  Other exercises?: Yes  Other exercises 1: Seated B LE ex x10  Other exercises 2: B LE seated hamstring curl and hip abduction with blue theraband x10-15 reps (P.M.)  Other exercises 3: B hip adduction with ball, 10x10s (P.M)  Other exercises 4: UBE, x2min fwd/2min bkwd (P.M)             PT Equipment Recommendations  Equipment Needed: (TBD)  Other: TBD       FIMS:      TRANSFERS  Bed, Chair, Wheel Chair: 4 - Requires steadying assistance only <25% assist  and/or requires assist with one leg only  GOAL: Bed, Chair, Wheel Chair: 6   LOCOMOTION  Primary Mode: Walk  GOAL: Walk/Wheel Chair: 6  Distance Walked: 60 ft  Distance Traveled in Wheel Chair: 0  Walk: 1 - Total Assistance Walks < 50 feet OR requires two or more people OR patient performs < 25% of locomotion effort  Wheel Chair: 0 - Activity Not Assessed/Does Not Occur  Stairs: 0 - Activity Does not Occur ( 0 only for the admission assessment)  GOAL: Stairs: 6    Assessment  Body structures, Functions, Activity limitations: Decreased functional mobility , Decreased strength, Decreased endurance, Decreased coordination, Decreased safe awareness, Decreased cognition, Decreased ROM, Decreased balance  Assessment: Pt [facility] stay at [facility] for cardiac arrest. Pt with sternal precautions and FWB. Pt able to perform bed mobility SBA except sup to sit min A due to surgical pain. Pt completed transfers and gait min A x1 with use of RW. sao2 ranged from 92-94% during activity and amb on room air. verbal cues were needed for hand placement during transfers for pt. Pt with more carryover for hand placement P.M session compared to A.M session. Pt tolerated P.M therapy well. Specific instructions for Next Treatment: progress mobility as tolerated  Prognosis: Good  Discharge Recommendations: Home with assist PRN  Activity Tolerance: Patient limited by endurance; Patient limited by Time Out 0925   Minutes 47   Time Code Minutes   Timed Code Treatment Minutes 25 Minutes     P.M time: 5200-3454: 43 minutes    PT evaluation/treatment is completed by Thompson Sever, SPT under the direct supervision of co-signing therapist, who agrees with all documentation and evaluation/treatment  Electronically signed by Thompson Sever, SPT on 4/26/19 at 10:41 AM

## 2019-04-26 NOTE — PROGRESS NOTES
[address]    Acute Rehabilitation OT Evaluation  Date: 19  Patient Name: Rosangela Beck       Room: 7262/2206-69  MRN: [id number]  Account: [de-identified]   : 1965  (46 y.o.) Gender: male     Referring Practitioner: Sondra Dominguez MD  Diagnosis: Acute CVA, cardiac arrest, CABG  Additional Pertinent Hx: Pt admitted to [facility] ED 19 with cardiac arrest and increased SOB. Pt underwent emergent CABG x4 19 with delayed closure on 19. Pt was intubated from 19 to 19. Pt also underwent screw removal from ORIF of R knee on 19 and I&D on the R knee 19. Pt admitted to [facility] for rehabiliation on 19    Treatment Diagnosis: impaired self care status    Past Medical History:  has a past medical history of CAD (coronary artery disease), Cardiac arrest ([facility] [address] Osteolysis, PEA (Pulseless electrical activity) ([facility] [address] and PVD (peripheral vascular disease) ([facility] [address] Past Surgical History:   has a past surgical history that includes Cardiac catheterization (2019); Femur Surgery; Tonsillectomy; Coronary artery bypass graft (2019); Hardware Removal (Right, 2019); Tibia fracture surgery (Right); Coronary artery bypass graft (N/A, 2019); RECONSTRUCTIVE REPAIR STERNAL (N/A, 2019); Hardware Removal (Right, 2019); and REMOVE HARDWARE FEMUR (N/A, 2019). Restrictions  Restrictions/Precautions: Cardiac, General Precautions, Surgical Protocols, Fall Risk(telesitter)  Sternal Precautions: Emergent CABG X4 No Pushing, No Pulling, 5# Lifting Restrictions   Other position/activity restrictions: Fall Risk  Required Braces or Orthoses?: Yes(PETER MENDENHALL R procare boot)      Subjective  Comments: Pt seated in recliner chair up OT arrival resting. Agreeable to OT evaluation, pleasant and cooperative throughout.  Pt does not have heart hugger, RN aware and ordering one  Orientation  Orientation Level: Oriented X4  Vision  Vision: Within Functional Limits  Hearing  Hearing: Within functional limits  Social/Functional History  Lives With: Other (comment)(recover addicts)  Type of Home: House(Pt is a resident at a substance abuse living facility)  Home Layout: Multi-level, Laundry in basement  Home Access: Stairs to enter with rails  Entrance Stairs - Number of Steps: 22  Entrance Stairs - Rails: Right  Bathroom Shower/Tub: Walk-in shower  Bathroom Toilet: Handicap height  Bathroom Equipment: (unsure)  Bathroom Accessibility: Accessible  Home Equipment: (none)  ADL Assistance: Independent  Homemaking Assistance: Independent(has a cook at the house)  [employer] Responsibilities: Yes  Ambulation Assistance: Independent  Transfer Assistance: Independent  Active : Yes  Mode of Transportation: Truck  Occupation: Full time employment, Part time employment  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 Pt does not use any AD or DME.         Objective      Cognition  Overall Cognitive Status: Exceptions  Attention Span: Difficulty dividing attention  Memory: Decreased recall of precautions, Decreased recall of recent events  Safety Judgement: Decreased awareness of need for assistance  Insights: Decreased awareness of deficits  Initiation: Requires cues for some  Sequencing: Requires cues for some   Sensation  Overall Sensation Status: WFL(per pt report)     UE Function  Hand Dominance  Hand Dominance: Right        LUE Strength  Gross LUE Strength: WFL  LUE Strength Comment: not formally tested due to recent CABG, functional throughout activity   Left Hand Strength -  (lbs)  Handle Setting 2: 36# ave   LUE Tone: Normotonic     LUE AROM (degrees)  LUE AROM : WFL     Left Hand AROM (degrees)  Left Hand AROM: WFL  RUE Strength  Gross RUE Strength: WFL  RUE Strength Comment: not formally tested due to recent CABG, functional throughout activity    Right Hand Strength -  (lbs)  Handle Setting 2: 47# ave   RUE Tone: Normotonic     RUE AROM (degrees)  RUE AROM : WFL     Right Hand AROM (degrees)  Right Hand AROM: WFL              Left 9-Hole Peg Test: Impaired  Left 9 Hole Peg Test Time (secs): 71  Right 9-Hole Peg Test: Impaired  Right 9 Hole Peg Test Time (secs): 56         Fine Motor Skills  Coordination  Movements Are Fluid And Coordinated: No  Coordination and Movement description: Decreased speed                           Mobility          Balance  Sitting Balance: Stand by assistance(supportive sitting in recliner / w/c )  Standing Balance: Minimal assistance  Standing Balance  Time: 1-2 minutes total   Activity: LB dressing /bathing at sink  Comment: minor unsteadiness, fatigues quickly      Bed mobility  Comment: no bed mobility assessed as pt was up in recliner chair and retired to recliner chair both sessions      Transfers  Stand Step Transfers: Minimal assistance, Moderate assistance  Stand Pivot Transfers: Minimal assistance, Moderate assistance  Sit to stand: Minimal assistance, Moderate assistance  Stand to sit: Minimal assistance, Moderate assistance  Transfer Comments: transfer from recliner chair <> w/c with RW ; sit to stand at sink for ADLs; pt is impulsive and \"plops\" back into seat; cues required for proper technique       Activity Tolerance: Patient limited by fatigue, Patient limited by pain, Treatment limited secondary to decreased cognition         FIM  SELF-CARE  Eatin - Feeds self with setup/supervision/cues and/or requires only setup/supervision/cues to perform tube feedings(set up assist )  GOAL: Eatin  Groomin - Requires setup/cues to do all tasks(set up assist, oral care, hair care, wash/dry face/hands)  GOAL: Groomin  Bathin - Able to bathe all 10 areas with setup/sup/cues(UB only per preference with set up)  GOAL: Bathin  Dressing-Upper: 5 - Requires setup/supervision/cues and/or requires assist with ADL participation, ARU process  REQUIRES OT FOLLOW UP: Yes  Discharge Recommendations: Continue to assess pending progress  Plan  Times per week: 5-7x/wk 1.5 hr/day   Current Treatment Recommendations: Balance Training, Functional Mobility Training, Endurance Training, Equipment Evaluation, Education, & procurement, Home Management Training, Patient/Caregiver Education & Training, Self-Care / ADL, Safety Education & Training, Pain Management, Strengthening        OT Individual Minutes  Time in 6134  Time out 9850 1142  Minutes 60      OT Individual Minutes  Time In: 2104  Time Out: 0556  Minutes: 30         Electronically signed by Dre Bryan OT on 4/26/19 at 11:10 AM

## 2019-04-26 NOTE — DISCHARGE SUMMARY
[facility] Cardiothoracic Surgery  Discharge Summary    Patient's Name/Date of Birth: Michelle Gallego / 1965 (53 y.o.)    Admission Date: 4/7/2019  4:48 AM  Discharge Date: 4/25/2019  7:05 PM  Discharge Physician: Dr. Mimi Tucker  Discharge Unit: [id number]  Discharge condition: stable  Disposition: Donna [facility]ett [facility] [facility]4      Reason For Admission:   Chief Complaint   Patient presents with    Cardiac Arrest     diff breather, brought in on cpap with elevations in EKG on EMS arrival.        HPI: Michelle Gallego is a 46 y.o.  male who came to the hospital with EMS due to worsening SOB over the last several days. EKG showed atrial fibrillation, recent infarct. In the ED, he had PEA arrest, CPR started. Cath lab activated in ED, severe multivessel CAD, unable to place IABP or Impella. Urgent consult to CT surgery, patient taken to surgery emergently with Dr. Mimi Tucker. Procedures completed in hospital: CABG x 4, delayed chest closure, removal of hardware right tibia    Brief Review of Hospital Course:   Admitted through ED on 4/7/19. Urgent CABG x 4, with open chest with Dr. Mimi Tucker the same day. Vascular consult for decreased pulses post op. Chest closure 4/8/19. Infectious disease on board for orthopedic hardware infected/osteomyelitis. Pulmonary consulted for difficulty weaning off ventilator. Neuro consulted for not following commands off sedation for prolonged period. Nephrology on board for ALMITA. Patient was finally able to be extubated. Ortho took to surgery to remove hardware in right tibia. Plan for Neuroendo intervention for LU stent, however delayed due to redness and edema in right leg. Ok to discharge to acute rehab ([facility]) and follow up for stent as an outpatient. Review of Systems   Constitutional: Negative for chills, fatigue and fever. HENT: Negative for congestion, dental problem, mouth sores and trouble swallowing. Eyes: Negative for discharge, redness and visual disturbance. Respiratory: Negative for cough, chest tightness and shortness of breath. Cardiovascular: Negative for chest pain, palpitations and leg swelling. Gastrointestinal: Negative for abdominal pain, constipation, nausea and vomiting. Endocrine: Negative for cold intolerance and heat intolerance. Genitourinary: Negative for difficulty urinating, flank pain and urgency. Musculoskeletal: Negative for gait problem, joint swelling and neck pain. Skin: Negative for rash and wound. Allergic/Immunologic: Negative for environmental allergies and immunocompromised state. Neurological: Negative for dizziness, syncope, weakness, light-headedness, numbness and headaches. Hematological: Does not bruise/bleed easily. Psychiatric/Behavioral: Negative for confusion, hallucinations and sleep disturbance. The patient is not nervous/anxious. Physical Exam:  Vitals:    04/25/19 1150   BP: (!) 113/56   Pulse: 71   Resp: 21   Temp: 97.6 °F (36.4 °C)   SpO2: 97%     Weight: Weight: 140 lb 14 oz (63.9 kg)    Weight: 176 lb 5.9 oz (80 kg)    I/O last 3 completed shifts: In: 3213 [P.O.:1100; I.V.:542]  Out: 3550 [Urine:3550]    General: alert and oriented to person, place and time. Up in chair, No apparent distress. Heart:Normal S1 and S2.  Regular rhythm. No murmurs, gallops, or rubs. Lungs: clear to auscultation bilaterally  Abdomen: soft, non tender, non distended, BSx4  Extremities: right leg 1+ edema  Wounds: clean and dry, healing appropriately.       Past Medical History:   Diagnosis Date    CAD (coronary artery disease) 04/07/2019    Cardiac arrest ([facility] [address] 04/07/2019    Osteolysis 04/07/2019    R Knee hardware    PEA (Pulseless electrical activity) ([facility] [address] 04/07/2019    PVD (peripheral vascular disease) ([facility] [address] 04/07/2019     Past Surgical History:   Procedure Laterality Date    CARDIAC CATHETERIZATION  04/07/2019    MVD-Stv    CORONARY ARTERY BYPASS GRAFT  04/07/2019    x4 per Dr. Dez Dudley, [facility]  CORONARY ARTERY BYPASS GRAFT N/A 4/7/2019    EMERGENT CPR,  CORONARY ARTERY BYPASS X4, ON PUMP ,CHEST LEFT OPEN; SWAN, ANGELA PER ANESTHESIA performed by Susana Morillo MD at [address] [facility] [place]      unsure if correct known tibial surgery    HARDWARE REMOVAL Right 04/07/2019    R Knee internal screw protruding through skin    HARDWARE REMOVAL Right 04/22/2019    HARDWARE REMOVAL RIGHT TIBIA,    RECONSTRUCTIVE REPAIR STERNAL N/A 4/8/2019    STERNAL  WOUND WASHOUT AND CLOSURE S/P CABG performed by Susana Morillo MD at [address] [facility] N/A 4/22/2019    HARDWARE REMOVAL RIGHT TIBIA, IRRIGATION AND DEBRIDEMENT [address] performed by Jayden Salcido MD at [address] Right     Vanice Hane off roof, external fixation and internal hardware    TONSILLECTOMY       No Known Allergies  History reviewed. No pertinent family history.   Social History     Socioeconomic History    Marital status:      Spouse name: Not on file    Number of children: Not on file    Years of education: Not on file    Highest education level: Not on file   Occupational History    Not on file   Social Needs    Financial resource strain: Not on file    Food insecurity:     Worry: Not on file     Inability: Not on file    Transportation needs:     Medical: Not on file     Non-medical: Not on file   Tobacco Use    Smoking status: Former Smoker   Substance and Sexual Activity    Alcohol use: Not on file    Drug use: Not on file    Sexual activity: Not on file   Lifestyle    Physical activity:     Days per week: Not on file     Minutes per session: Not on file    Stress: Not on file   Relationships    Social connections:     Talks on phone: Not on file     Gets together: Not on file     Attends [employer] service: Not on file     Active member of club or organization: Not on file     Attends meetings of clubs or organizations: Not on file     Relationship status: Not on file    Intimate partner violence:     Fear of current or ex partner: Not on file     Emotionally abused: Not on file     Physically abused: Not on file     Forced sexual activity: Not on file   Other Topics Concern    Not on file   Social History Narrative    Not on file          Medication List      You have not been prescribed any medications. Data:    CBC:   Recent Labs     04/24/19  0421 04/24/19  1144 04/25/19  0439 04/26/19  0842   WBC 18.0*  --  16.6* 15.5*   HGB 9.1*  --  10.2* 10.2*   HCT 30.4*  --  32.9* 33.1*   MCV 97.7  --  95.1 96.3   * 667* 733* 694*     BMP:   Recent Labs     04/24/19  0421 04/25/19  0439 04/26/19  0842    139 134*   K 3.8 4.1 4.5    102 96*   CO2 25 26 25   BUN 9 13 15   CREATININE 0.43* 0.48* 0.57*   MG 1.9 1.9  --      Accucheck Glucoses:   Recent Labs     04/25/19  1151 04/25/19  1641 04/25/19  2020 04/26/19  0627 04/26/19  1033   POCGLU 164* 192* 330* 167* 304*     Cardiac Enzymes: No results for input(s): CKTOTAL, CKMB, CKMBINDEX, TROPONINI in the last 72 hours. PTT/PT/INR:   Recent Labs     04/24/19  0421 04/25/19 0439   PROTIME 14.0* 12.9*   INR 1.4 1.2     No results for input(s): APTT in the last 72 hours.   Liver Profile:  Lab Results   Component Value Date     04/19/2019     04/19/2019    BILIDIR 0.55 04/19/2019    BILITOT 1.01 04/19/2019    ALKPHOS 206 04/19/2019     Lab Results   Component Value Date    CHOL 56 04/17/2019    HDL 13 04/17/2019    TRIG 107 04/17/2019     TSH: No results found for: TSH  UA:   Lab Results   Component Value Date    COLORU DARK YELLOW 04/14/2019    PHUR 5.0 04/14/2019    WBCUA 0 TO 2 04/14/2019    RBCUA 2 TO 5 04/14/2019    MUCUS NOT REPORTED 04/14/2019    TRICHOMONAS NOT REPORTED 04/14/2019    YEAST NOT REPORTED 04/14/2019    BACTERIA NOT REPORTED 04/14/2019    SPECGRAV 1.024 04/14/2019    LEUKOCYTESUR NEGATIVE 04/14/2019    UROBILINOGEN Normal 04/14/2019    BILIRUBINUR NEGATIVE 04/14/2019 GLUCOSEU 3+ 04/14/2019    AMORPHOUS NOT REPORTED 04/14/2019       Problem List Items Addressed This Visit     * (Principal) RESOLVED: Cardiac arrest ([facility] [address]    Relevant Medications    heparin (porcine) injection 4,000 Units (Completed)    heparin infusion 2 units/mL in 0.9% NaCl (Completed)    amiodarone (CORDARONE) 150 mg in dextrose 5 % 100 mL bolus (Completed)    furosemide (LASIX) injection 20 mg (Completed)    furosemide (LASIX) injection 80 mg (Completed)    amLODIPine (NORVASC) tablet 5 mg (Completed)    furosemide (LASIX) 10 MG/ML injection (Completed)    metoprolol (LOPRESSOR) injection 5 mg (Completed)    RESOLVED: Acute ST elevation myocardial infarction (STEMI) of anterior wall (HCC) - Primary    Relevant Medications    heparin (porcine) injection 4,000 Units (Completed)    heparin infusion 2 units/mL in 0.9% NaCl (Completed)    amiodarone (CORDARONE) 150 mg in dextrose 5 % 100 mL bolus (Completed)    furosemide (LASIX) injection 20 mg (Completed)    furosemide (LASIX) injection 80 mg (Completed)    amLODIPine (NORVASC) tablet 5 mg (Completed)    furosemide (LASIX) 10 MG/ML injection (Completed)    metoprolol (LOPRESSOR) injection 5 mg (Completed)    Other Relevant Orders    [facility] [address]             Discharge Plan:  Follow up with CT Surgery  in 1 week. Call office at [phone] for any problems. Follow up with PCP and cardiology in 1-2 weeks. Patient was discharged on Aspirin, Plavix,  ACE-I, BB, and Statin therapy per protocol.       Madonna Meadows CNP  Phone: [phone]

## 2019-04-27 LAB
CULTURE: ABNORMAL
CULTURE: ABNORMAL
DIRECT EXAM: ABNORMAL
DIRECT EXAM: ABNORMAL
GLUCOSE BLD-MCNC: 174 MG/DL (ref 75–110)
GLUCOSE BLD-MCNC: 188 MG/DL (ref 75–110)
GLUCOSE BLD-MCNC: 250 MG/DL (ref 75–110)
GLUCOSE BLD-MCNC: 371 MG/DL (ref 75–110)
Lab: ABNORMAL
SPECIMEN DESCRIPTION: ABNORMAL

## 2019-04-27 PROCEDURE — 97110 THERAPEUTIC EXERCISES: CPT

## 2019-04-27 PROCEDURE — 6370000000 HC RX 637 (ALT 250 FOR IP): Performed by: NURSE PRACTITIONER

## 2019-04-27 PROCEDURE — 99232 SBSQ HOSP IP/OBS MODERATE 35: CPT | Performed by: PHYSICAL MEDICINE & REHABILITATION

## 2019-04-27 PROCEDURE — 6370000000 HC RX 637 (ALT 250 FOR IP): Performed by: PHYSICAL MEDICINE & REHABILITATION

## 2019-04-27 PROCEDURE — 97112 NEUROMUSCULAR REEDUCATION: CPT

## 2019-04-27 PROCEDURE — 99232 SBSQ HOSP IP/OBS MODERATE 35: CPT | Performed by: INTERNAL MEDICINE

## 2019-04-27 PROCEDURE — 97535 SELF CARE MNGMENT TRAINING: CPT

## 2019-04-27 PROCEDURE — 6360000002 HC RX W HCPCS: Performed by: NURSE PRACTITIONER

## 2019-04-27 PROCEDURE — 82947 ASSAY GLUCOSE BLOOD QUANT: CPT

## 2019-04-27 PROCEDURE — 97116 GAIT TRAINING THERAPY: CPT

## 2019-04-27 PROCEDURE — 97530 THERAPEUTIC ACTIVITIES: CPT

## 2019-04-27 PROCEDURE — 1180000000 HC REHAB R&B

## 2019-04-27 PROCEDURE — 51798 US URINE CAPACITY MEASURE: CPT

## 2019-04-27 RX ADMIN — INSULIN LISPRO 4 UNITS: 100 INJECTION, SOLUTION INTRAVENOUS; SUBCUTANEOUS at 11:27

## 2019-04-27 RX ADMIN — ASPIRIN 81 MG 81 MG: 81 TABLET ORAL at 08:04

## 2019-04-27 RX ADMIN — Medication 1 MG: at 21:11

## 2019-04-27 RX ADMIN — INSULIN LISPRO 2 UNITS: 100 INJECTION, SOLUTION INTRAVENOUS; SUBCUTANEOUS at 08:03

## 2019-04-27 RX ADMIN — FAMOTIDINE 20 MG: 20 TABLET ORAL at 08:05

## 2019-04-27 RX ADMIN — METOPROLOL TARTRATE 50 MG: 50 TABLET ORAL at 21:11

## 2019-04-27 RX ADMIN — INSULIN LISPRO 2 UNITS: 100 INJECTION, SOLUTION INTRAVENOUS; SUBCUTANEOUS at 17:00

## 2019-04-27 RX ADMIN — ATORVASTATIN CALCIUM 40 MG: 40 TABLET, FILM COATED ORAL at 21:11

## 2019-04-27 RX ADMIN — QUETIAPINE FUMARATE 50 MG: 50 TABLET ORAL at 21:11

## 2019-04-27 RX ADMIN — METOPROLOL TARTRATE 50 MG: 50 TABLET ORAL at 08:05

## 2019-04-27 RX ADMIN — POTASSIUM CHLORIDE 40 MEQ: 1500 TABLET, EXTENDED RELEASE ORAL at 08:05

## 2019-04-27 RX ADMIN — LISINOPRIL 5 MG: 5 TABLET ORAL at 08:06

## 2019-04-27 RX ADMIN — DOXYCYCLINE 100 MG: 100 CAPSULE ORAL at 08:05

## 2019-04-27 RX ADMIN — ENOXAPARIN SODIUM 40 MG: 100 INJECTION SUBCUTANEOUS at 08:06

## 2019-04-27 RX ADMIN — POTASSIUM CHLORIDE 40 MEQ: 1500 TABLET, EXTENDED RELEASE ORAL at 17:00

## 2019-04-27 RX ADMIN — DOXYCYCLINE 100 MG: 100 CAPSULE ORAL at 21:11

## 2019-04-27 RX ADMIN — FAMOTIDINE 20 MG: 20 TABLET ORAL at 21:11

## 2019-04-27 RX ADMIN — DOCUSATE SODIUM 100 MG: 100 CAPSULE, LIQUID FILLED ORAL at 21:11

## 2019-04-27 RX ADMIN — SPIRONOLACTONE 25 MG: 25 TABLET, FILM COATED ORAL at 08:06

## 2019-04-27 RX ADMIN — MULTIPLE VITAMINS W/ MINERALS TAB 1 TABLET: TAB at 08:05

## 2019-04-27 RX ADMIN — CLOPIDOGREL BISULFATE 75 MG: 75 TABLET ORAL at 08:05

## 2019-04-27 RX ADMIN — INSULIN GLARGINE 30 UNITS: 100 INJECTION, SOLUTION SUBCUTANEOUS at 08:03

## 2019-04-27 RX ADMIN — FUROSEMIDE 40 MG: 40 TABLET ORAL at 08:06

## 2019-04-27 RX ADMIN — INSULIN LISPRO 5 UNITS: 100 INJECTION, SOLUTION INTRAVENOUS; SUBCUTANEOUS at 21:12

## 2019-04-27 RX ADMIN — DOCUSATE SODIUM 100 MG: 100 CAPSULE, LIQUID FILLED ORAL at 08:04

## 2019-04-27 RX ADMIN — FUROSEMIDE 40 MG: 40 TABLET ORAL at 17:00

## 2019-04-27 NOTE — PROGRESS NOTES
Physical Therapy    [address] [facility]  Acute Rehabilitation Physical Therapy Progress Note    Date: 19  Patient Name: Karlie Meza       Room: 7126/3130-27  MRN: [id number]   Account: [de-identified]   : 1965  (46 y.o.) Gender: male     Referring Practitioner: Jovi Rosenberg MD  Diagnosis: Acute CVA, cardiac arrest, CABG  Past Medical History:  has a past medical history of CAD (coronary artery disease), Cardiac arrest ([facility] [address] Osteolysis, PEA (Pulseless electrical activity) ([facility] [address] and PVD (peripheral vascular disease) ([facility] [address] Past Surgical History:   has a past surgical history that includes Cardiac catheterization (2019); Femur Surgery; Tonsillectomy; Coronary artery bypass graft (2019); Hardware Removal (Right, 2019); Tibia fracture surgery (Right); Coronary artery bypass graft (N/A, 2019); RECONSTRUCTIVE REPAIR STERNAL (N/A, 2019); Hardware Removal (Right, 2019); and REMOVE HARDWARE FEMUR (N/A, 2019). Additional Pertinent Hx: Pt admitted to [facility] ED 19 presenting with cardiac arrest and increased SOB. Pt underwent emergent CABG x4 19 with delayed closure on 19. Pt was intubated from 19 to 19. Pt also underwent screw removal from ORIF of R knee bedside on 19. Hardware removal of R tibia and I&D on 19. Pt admitted to [address] [place] [address] for intense rehabiliation on 19. 19 MRI shows R cerebellum infarct. CT on 19 shows Lower parietal infarct. Restrictions/Precautions  Restrictions/Precautions: Cardiac;General Precautions;Surgical Protocols; Fall Risk(telesitter)  Required Braces or Orthoses?: Yes(R AFO, R procare boot)  Required Braces or Orthoses  Other: Heart Hugger Brace  Right Lower Extremity Brace: Ankle Foot Orthotics  RLE Brace Type: Profo boot and AFO  Left Lower Extremity Brace: Boot  LLE Brace Type: Profo boot  Position Activity Restriction  Sternal Precautions: No Pushing; No Pulling;5# Lifting Restrictions  Sternal Precautions: Emergent CABG X4  Other position/activity restrictions: Fall Risk            Vital Signs  Patient Currently in Pain: Denies        Oxygen Therapy  SpO2: 95 %  Pulse Oximeter Device Mode: Intermittent  Pulse Oximeter Device Location: Finger  O2 Device: None (Room air)          Bed Mobility:   Bed Mobility  Bridging: Contact guard assistance(to assist (R) LE in neutral)  Supine to Sit: Minimal assistance(vc's for technique)  Sit to Supine: Stand by assistance(VC's to used heart hugger during t/f with little carryover)  Bed mobility  Bridging: Contact guard assistance(to assist (R) LE in neutral)    Transfers:  Sit to Stand: Minimal Assistance;Contact guard assistance(vc's for hand placement and use of heart hugger during T/F)  Stand to sit: Minimal Assistance(VC's for hand placement and use of  heart hugger)     Stand Pivot Transfers: Contact guard assistance(w/RW)           Ambulation 1  Surface: level tile  Device: Rolling Walker  Assistance: Contact guard assistance  Quality of Gait: decreased ton, decreased step length, R foot drop, decreased endurance  Distance: 80ft  Comments: 1 standing  rest break taken, sao2 96% HR 105bpm during amb. Pt has AFO, however not used due to swelling ( try use of AFO when appropriate  Ambulation 2  Surface - 2: level tile  Device 2: Rolling Walker  Assistance 2: Contact guard assistance  Quality of Gait 2: tends to slide (R) LE vs picking it up with advancing, vc's to correct. No LOB  Distance: 110ft  Comments: SPO2 and HR stayed WNL during amb     Stairs/Curb  Stairs?: Yes  Stairs  # Steps : 5  Stairs Height: 4\"(and 6\")  Rails: Bilateral  Device: No Device  Other Apparatus: (PRAFO boot (R)LE)  Assistance: Contact guard assistance  Comment: verbal and demonstration given for proper sequence/technique to ascend/decend steps. Pt able to complete with step-to pattern.  ifeanyi well(HR 86bpm, SPO2 96%)          FIMS:      TRANSFERS  Bed, Chair, Wheel Chair: 4 - Requires steadying assistance only <25% assist  and/or requires assist with one leg only   LOCOMOTION  Primary Mode: Walk  Distance Walked: 80ft  Walk: 2 - Maximal Assistance Requires up to Maximal Assistance AND requires assistance of one person to walk between  feet (Patient performs 25-49% of locomotion effort or goes between  feet)  Stairs: 2- Maximal Assistance Performs 25-49% of the effort to go up and down 4 to 6 stairs and requires the assistance of one person only       BALANCE Posture: Fair  Sitting - Static: Good  Sitting - Dynamic: Good  Standing - Static: Good;-  Standing - Dynamic: Fair;+  Comments: standing with RW    EXERCISES    Other exercises?: Yes  Other exercises 1: Supine (B) LE ex x 10; 0# (R) 1.5# (L)(PRAFO boot on (R) LE)  Other exercises 2: standing (B) LE ex x 10;multiple rest breaks taken between ex due to fatigue. Had pt stand on 4\" box with LLE to allow clearance for (R) LE ex ex due to [facility] boot.(SPO2, and HR WNL during ex)  Other exercises 3: NU-step L2 x 6min, LE's only  Other exercises 4: standing balloon bat x 2 min: slight LOB x 1, vc's to keep at least 1UE on RW at all times for safety           Activity Tolerance: Patient limited by endurance, Patient limited by fatigue  PT Equipment Recommendations  Equipment Needed: (TBD)       Current Treatment Recommendations: ROM, Strengthening, Functional Mobility Training, Endurance Training, Transfer Training, Gait Training, Stair training, Balance Training, Neuromuscular Re-education, Home Exercise Program, Safety Education & Training, Patient/Caregiver Education & Training, Equipment Evaluation, Education, & procurement    Conditions Requiring Skilled Therapeutic Intervention  Body structures, Functions, Activity limitations: Decreased functional mobility ; Decreased strength;Decreased endurance;Decreased coordination;Decreased safe awareness;Decreased cognition;Decreased ROM; Decreased balance  Prognosis: Good  REQUIRES PT FOLLOW UP: Yes  Discharge Recommendations: Home with assist PRN    Goals  Short term goals  Time Frame for Short term goals: 1 week  Short term goal 1: Pt to perform bed mobility mod I  Short term goal 2: Pt to perform transfers CGA with use of RW  Short term goal 3: Pt to amb  ft with RW and CGA with no rest break  Short term goal 4: Pt to ascend/descend 5 steps with bilateral handrail and min A x1  Long term goals  Time Frame for Long term goals : by discharge  Long term goal 1: Pt to perform bed mobility independent  Long term goal 2: pt to perform transfers with least restrictive DME and supervision  Long term goal 3: pt to amb with least restrictive -250 ft supervision  Long term goal 4: Pt to ascend/descend a flight of stairs with R handrail supervision  Long term goal 5: Pt to demonstrate understanding of HEP       04/27/19 0843 04/27/19 1310   PT Individual Minutes   Time In 0841 1250   Time Out 0943 1320   Minutes 62 30       Electronically signed by RAI Banegas on 4/27/19 at 1:47 PM

## 2019-04-27 NOTE — PLAN OF CARE
Problem: Risk for Impaired Skin Integrity  Goal: Tissue integrity - skin and mucous membranes  Description  Structural intactness and normal physiological function of skin and  mucous membranes.   4/27/2019 0534 by Farhana Souza RN  Outcome: Ongoing  Note:   Wound care consulted       Problem: Falls - Risk of:  Goal: Will remain free from falls  Description  Will remain free from falls  4/27/2019 0534 by Farhana Souza RN  Outcome: Ongoing  Note:   Telesitter in use  Room close to nurse's station  Up with assistance     Problem: Falls - Risk of:  Goal: Absence of physical injury  Description  Absence of physical injury  Outcome: Ongoing     Problem: Pain:  Goal: Pain level will decrease  Description  Pain level will decrease  Outcome: Ongoing     Problem: Pain:  Goal: Control of acute pain  Description  Control of acute pain  4/27/2019 0534 by Farhana Souza RN  Outcome: Ongoing     Problem: Nutrition  Goal: Optimal nutrition therapy  4/27/2019 0534 by Farhana Souza RN  Outcome: Ongoing  Note:   Dietician consulted

## 2019-04-27 NOTE — OP NOTE
[facility]                                OPERATIVE REPORT    PATIENT NAME: Rachelle Cordero                        :        1965  MED REC NO:   [id number]                             ROOM:       1801  ACCOUNT NO:   [de-identified]                           ADMIT DATE: 2019  PROVIDER:     Sowmya Howard DO    DATE OF PROCEDURE:  2019PREOPERATIVE DIAGNOSIS:  Infected  hardware, right tibia. POSTOPERATIVE DIAGNOSIS:  Infected hardware, right tibia. PROCEDURES:  Hardware removal, right tibia; I and D with sharp  excisional debridement of subcutaneous tissue and bone. ANESTHESIA:  General.    SURGEON:  Bing Vann. Lashaun Harding MD    ASSISTANT:  Sowmya Howard DO, PGY-2    FLUIDS:  350 mL of crystalloid. ESTIMATED BLOOD LOSS:  10 mL. SPECIMENS:  One tissue sample from the right leg for anaerobic and  aerobic culture. FINDINGS:  Infected deep orthopedic hardware. INDICATIONS:  Briefly, the patient is a 13-year-old male who presented  to [facility] Emergency Department after being found in PEA  arrest.  He underwent multiple rounds of CPR and was ultimately taken to  the OR for 4-vessel bypass. Given his unstable nature, he was left  intubated with his chest open with plan for return for I and D and  closure of the chest.  On the MRI, he was found to have a large  prominent screw in the lateral aspect of his knee with malodorous  discharge. Further workup demonstrated that the patient had history of  a tibial plateau fracture. Further elucidation of the history  demonstrated that the patient had sustained his injury many years ago  after he had fallen off a roof, where it was fixed by an outside surgeon. At that time noncompliant  with his followup with his orthopedic surgeon. At that time, the screw  was removed and sent for cultures.   It was noted that the hardware was  indeed infected. Once the patient was stabilized and extubated and  appropriate for surgical intervention, we then discussed the need to  remove the hardware with irrigation and debridement and ultimately  antibiotic treatment for the infected hardware. DESCRIPTION OF PROCEDURE:  On the day of surgery, the patient was met in  the preoperative area. Again, all risks, benefits, and alternatives  were discussed with the patient including, but not limited to,  infection, bleeding, blood loss, blood clots, damage to surrounding  neurovascular structures, increased pain, increased swelling, decreased  function, need for further surgery, re-fracture, and anesthesia risks,  as well as loss of life. The patient agreed to and accepted these risks  and signed informed consent willingly. The operative site was then  marked with a surgical marking pen. The patient was wheeled from the  preoperative area to the operating room. The patient was safely  transported from the operating room table onto the hospital bed in the  supine position. The Department of Anesthesia then provided the patient  with adequate sedation and placement of an LMA. 2 gm of Ancef were  given to the patient preoperatively. A surgical marking pen was used to identify the surgical landmarks and  the previous surgical scar. A #10 blade was used to make an incision  down through the skin and subcutaneous tissue and ultimately down to the  plate. We removed all visible screws as well as the orthopedic plate  and single AP fluoroscopic image demonstrated removal of all orthopedic  hardware. We then obtained some tissue sample of bone and subcutaneous  tissue to be sent for analysis via cultures. The wound was then  thoroughly irrigated with normal saline and sharp excisional debridement  of skin and subcutaneous tissue as well as bone proceeded.   At this  time, we then closed the wound with 0 PDS suture in an interrupted  trauma stitch fashion, and a soft dressing was applied to the right  lower extremity. At this time, the Department of Anesthesia then  reversed the patient's sedation and successfully removed the LMA without  complication. The patient was safely transported from the operating  table onto the hospital bed and wheeled to the PACU in stable condition. Dr. Maurisio Pineda was present and active for all portions of the case.         Wilburt Canavan, DO    D: 04/26/2019 20:37:32       T: 04/27/2019 1:52:16     [email or web address]  Job#: [id number]     Doc#: [id number]    CC:  Ashanti Abbasi DO

## 2019-04-27 NOTE — PLAN OF CARE
Individualized Plan of Care  [facility] Inpatient Rehabilitation Unit    Rehabilitation physician: Dr. Elan Bowles Date: 4/25/2019     Rehabilitation Diagnosis: Cardiac arrest, cause unspecified [I46.9]  Acute CVA (cerebrovascular accident) [facility]) [I63.9]     Rehabilitation impairments: self care, mobility, motor dysfunction, bowel/bladder management and pain management    Factors facilitating achievement of predicted outcomes:  Motivated and Cooperative  Barriers to the achievement of predicted outcomes: Cognitive deficit, Anxiety, Limited insight into deficits, Unrealistic expectations, Decreased endurance, Upper extremity weakness, Lower extremity weakness, Medical complications, Skin Care, Wound Care and Medication managment    Patient Goals: Improve independence with mobility, Improvement of mobility at a wheelchair level, Increase overall strength and endurance, Increase balance, Increase endurance, Increase independence with activities of daily living, Improve cognition, Increase self-awareness, Increase safety awareness, Increase community integration, Increase socialization, Functional communication with caregivers, Integrate appropriate pain management plan, Assure adequate nutritional option for discharge, Continence of bowel and bladder and Provide appropriate patient and family education      NURSING:  Nursing goals for Todd Servin while on the rehabilitation unit will include:  Continence of bowel and bladder, Adequate number of bowel movements, Urinate with no urinary retention >300ml in bladder, Complete bladder protocol with gamino removal, Maintain O2 SATs at an acceptable level during stay, Effective pain management while on the rehabilitation unit, Establish adequate pain control plan for discharge, Absence of skin breakdown while on the rehabilitation unit, Improved skin integrity via assessments including wound measurements, Avoidance of any hospital acquired infections, No signs/symptoms of transfers with CGA and LRD  Short term goal 2: perform LB ADL tasks with Min A   Short term goal 3: V/D Good understanding of AE and/or modified/compensatory techniques for increased safety and independence with self care and mobility tasks  Short term goal 4: V/D Good understanding of sternal precautions as they relate to self care and mobility tasks  Short term goal 5: increase standing tolerance to 5-7 minutes with 0-1 UE support during functional task  Short term goal 6: actively participate in 30 min therapeutic exercise/activity to promote increased safety and independence with self care and mobility tasks   Long term goals  Time Frame for Long term goals : by discharge, pt will  Long term goal 1: perform functional transfer and functional mobility with Mod I and LRD  Long term goal 2: perform BADL tasks with Mod I   Long term goal 3: demonstrate Good safety throughout all ADL/mobility tasks  Long term goal 4: perform simple IADL tasks with Mod I     Plan of Care: Patient to be seen by occupational therapy services 1 Hour per day at least 5 out of 7 days per week     Anticipated interventions may include ADL and IADL retraining, strengthening, safety education and training, patient/caregiver education and training, equipment evaluation/ training/procurement, neuromuscular reeducation, wheelchair mobility training. SPEECH THERAPY:   Goals:             Short-term Goals  Goal 1: Pt will recall 3-5 units with and without distractions in 4/5 opportunities given minimal prompts. Goal 2: Pt will recall 6-8 units given a concrete or an abstract category in 4/5 opportunities given minimal prompts. Goal 3: Pt will complete verbal sequencing tasks with 90% accuracy given minimal prompts. Goal 4: Pt will complete task insight tasks with 90% accuracy given minimal prompts. Goal 5: Pt will complete thought flexibility tasks with 90% accuracy given minimal prompts. Goal 6:  Increase pt ed re strategies to improve functional recall with returned demo at 100%        Plan of Care: Pt to be seen by speech therapy services 1 Hour per day at least 5 out of 7 days per week    Anticipated interventions may include speech/language/communication therapy, cognitive training, group therapy, education, and/or dysphagia therapy based on the above goals. CASE MANAGEMENT:  Goals:   Assist patient/family with discharge planning, patient/family counseling,  and coordination with insurance during the inpatient rehabilitation stay. Other members of the multidisciplinary rehabilitation team that will be involved in the patient's plan of care include recreational therapy, dietary, respiratory therapy, and neuropsychology. Medical issues being managed closely and that require 24 hour availability of a physician:  Swallowing precautions, Bowel/Bladder function, Weight bearing precautions, Wound care, Pain management, Infection protection, DVT prophylaxis, Fall precautions, Fluid/Electrolyte balance, Nutritional status, Respiratory needs, Anemia and History of heart disease                                           Physician anticipated functional outcomes: Improved independence with functional measures   Estimated length of stay for this admission 3 weeks  Medical Prognosis: Fair  Anticipated disposition: Home with [address] [place] [address]. The potential to achieve the above medical and rehabilitative goals is very good. This plan of care has been developed with the assistance and input of the multidisciplinary rehabilitation team.  The plan was reviewed with the patient on 4/27/2019. The patient has had the opportunity to provide input to the therapy team.    I have reviewed this Individualized Plan of Care and agree with its contents. Above documentation has been expanded, modified, adjusted to reflect the findings of my evaluations and goals for the patient. Physician:  Electronically signed by Ruther Ganser. Aminata Nice MD on 4/27/19 at 12:13 PM

## 2019-04-27 NOTE — PROGRESS NOTES
19 1116 19 1544 19 1545   OT Individual Minutes   Time In [phone]   Time Out 1120 1345 1430   Minutes 50 700 [facility]   ACUTE REHABILITATION OCCUPATIONAL THERAPY  DAILY NOTE    Date: 19  Patient Name: Millicent Bence      Room: 1766/7403-50    MRN: [id number]   : 1965  (46 y.o.)  Gender: male   Referring Practitioner: Kenya Camarena MD  Diagnosis: Acute CVA, cardiac arrest, CABG  Additional Pertinent Hx: Pt admitted to [facility] ED 19 with cardiac arrest and increased SOB. Pt underwent emergent CABG x4 19 with delayed closure on 19. Pt was intubated from 19 to 19. Pt also underwent screw removal from ORIF of R knee on 19 and I&D on the R knee 19. Pt admitted to [facility] for rehabiliation on 19    Restrictions  Restrictions/Precautions: Cardiac, General Precautions, Surgical Protocols, Fall Risk(telesitter)  Sternal Precautions: Emergent CABG X4  Other position/activity restrictions: Fall Risk  Required Braces or Orthoses  Other: Heart Hugger Brace  Right Lower Extremity Brace: Ankle Foot Orthotics  RLE Brace Type: Profo boot and AFO  Left Lower Extremity Brace: Boot  LLE Brace Type: Profo boot  Required Braces or Orthoses?: Yes(R AFO, R procare boot)    Subjective \"I don't know why this boot is on my leg\"   Patient Currently in Pain: No     Overall Orientation Status: Impaired          Objective Nsg okayed patient to wear AFO over bandaged RLE. Reviewed cardiac precautions  And norms for HR and O2 saturation with fair carryover. Pt Has difficulty with verbalization. Handout distributed and placed within view of patient for patient to refer to. O2 90% and bpm 71. Decreased safety while ambulating with walker.   Continue POC         Balance  Standing Balance: Minimal assistance(SBA )   sit to stand and stand to sit SBA   transfer using r/w Supervision                                            OT during functional task  Short term goal 6: actively participate in 30 min therapeutic exercise/activity to promote increased safety and independence with self care and mobility tasks   Long term goals  Time Frame for Long term goals : by discharge, pt will  Long term goal 1: perform functional transfer and functional mobility with Mod I and LRD  Long term goal 2: perform BADL tasks with Mod I   Long term goal 3: demonstrate Good safety throughout all ADL/mobility tasks  Long term goal 4: perform simple IADL tasks with Mod I        Electronically signed by REGINE Interiano on 4/27/19 at 3:46 PM

## 2019-04-27 NOTE — PROGRESS NOTES
Physical Medicine & Rehabilitation  Progress Note    4/27/2019 12:06 PM     CC: Ambulatory and ADL dysfunction due to cardiac arrest, CABG, CVA, infected right lower extremity hardware, metabolic encephalopathy and respiratory failure    Subjective:   No complaints. States that 2-3 hours which is normal for him. Nursing notes sometimes sitting at edge of bed but not attempt to get out of bed. No other complaints. ROS:  Denies fevers, chills, sweats. No chest pain, palpitations, lightheadedness. Denies coughing, wheezing or shortness of breath. Denies abdominal pain, nausea, diarrhea or constipation. No new areas of joint pain. Denies new areas of numbness or weakness. Denies new anxiety or depression issues. No new skin problems. Rehabilitation:   PT:  Restrictions/Precautions: Cardiac, General Precautions, Surgical Protocols, Fall Risk(telesitter)  Sternal Precautions: Emergent CABG X4  Other position/activity restrictions: Fall Risk  Required Braces or Orthoses  Other: Heart Hugger Brace  Right Lower Extremity Brace: Ankle Foot Orthotics  RLE Brace Type: Profo boot and AFO  Left Lower Extremity Brace: Boot  LLE Brace Type: Profo boot   Transfers  Sit to Stand: Minimal Assistance(vc's for hand placement and use of heart hugger during T/F)  Stand to sit: Minimal Assistance(VC's for hand placement and use of  heart hugger)  Bed to Chair: Minimal assistance, Moderate assistance  Stand Pivot Transfers: Contact guard assistance(w/RW)  Comment: Pt used RW. Pt needs verbal cueing for hand placement during transfers with little carry over. At times pt lose balance posteriorly  Ambulation 1  Surface: level tile  Device: Rolling Walker  Other Apparatus: Wheelchair follow  Assistance: Contact guard assistance  Quality of Gait: decreased ton, decreased step length, R foot drop, decreased endurance  Distance: 80ft  Comments: 1 standing  rest break taken, sao2 96% HR 105bpm during amb.  Pt has AFO, however not used due to swelling ( try use of AFO when appropriate    Transfers  Sit to Stand: Minimal Assistance(vc's for hand placement and use of heart hugger during T/F)  Stand to sit: Minimal Assistance(VC's for hand placement and use of  heart hugger)  Bed to Chair: Minimal assistance, Moderate assistance  Stand Pivot Transfers: Contact guard assistance(w/RW)  Comment: Pt used RW. Pt needs verbal cueing for hand placement during transfers with little carry over. At times pt lose balance posteriorly  Ambulation  Ambulation?: Yes  More Ambulation?: Yes  Ambulation 1  Surface: level tile  Device: Rolling Walker  Other Apparatus: Wheelchair follow  Assistance: Contact guard assistance  Quality of Gait: decreased ton, decreased step length, R foot drop, decreased endurance  Distance: 80ft  Comments: 1 standing  rest break taken, sao2 96% HR 105bpm during amb. Pt has AFO, however not used due to swelling ( try use of AFO when appropriate    Surface: level tile  Ambulation 1  Surface: level tile  Device: Rolling Walker  Other Apparatus: Wheelchair follow  Assistance: Contact guard assistance  Quality of Gait: decreased ton, decreased step length, R foot drop, decreased endurance  Distance: 80ft  Comments: 1 standing  rest break taken, sao2 96% HR 105bpm during amb.  Pt has AFO, however not used due to swelling ( try use of AFO when appropriate        OT:  ADL  Additional Comments: see FIMs          Balance  Sitting Balance: Stand by assistance(supportive sitting in recliner / w/c )  Standing Balance: Minimal assistance   Standing Balance  Time: 1-2 minutes total   Activity: LB dressing /bathing at sink  Comment: minor unsteadiness, fatigues quickly         Bed mobility  Rolling to Left: Stand by assistance  Rolling to Right: Stand by assistance  Supine to Sit: Minimal assistance(trunk A)  Sit to Supine: Stand by assistance  Scooting: Stand by assistance  Comment: no bed mobility assessed as pt was up in recliner chair and retired to recliner chair both sessions   Transfers  Stand Step Transfers: Minimal assistance, Moderate assistance  Stand Pivot Transfers: Minimal assistance, Moderate assistance  Sit to stand: Minimal assistance, Moderate assistance  Stand to sit: Minimal assistance, Moderate assistance  Transfer Comments: transfer from recliner chair <> w/c with RW ; sit to stand at sink for ADLs; pt is impulsive and \"plops\" back into seat; cues required for proper technique        FIM  SELF-CARE  Eatin - Feeds self with setup/supervision/cues and/or requires only setup/supervision/cues to perform tube feedings(set up assist )  GOAL: Eatin  Groomin - Requires setup/cues to do all tasks(set up assist, oral care, hair care, wash/dry face/hands)  GOAL: Groomin  Bathin - Able to bathe all 10 areas with setup/sup/cues(UB only per preference with set up)  GOAL: Bathin  Dressing-Upper: 5 - Requires setup/supervision/cues and/or requires assist with presthesis/brace only(set up assistance for overhead shirt)  GOAL: Dressing-Upper: 6  Dressing-Lower: 1 - Total assist with lower body dressing(TA with pants, underwear, socks)  GOAL: Dressing-Lower: 6  Toiletin - Did not occur(declined)  GOAL: Toiletin  Toilet Transfer: 0 - Did not occur(declined)  Primary Mode: Shower  GOAL: Tub, Shower: 6  Shower Transfer: 0 - Activity does not occur(washed up at sink)        ST:    Cognitive Diagnosis: Cognitive evaluation revealed deficits in the areas of immediate/delayed recall, abstract reasoning (deductive/inductive), numeric reasoning, problem solving, and thought flexibility   Aphasia Diagnosis: generative naming deficits noted, mild anomia in conversation  Speech Diagnosis: no dysarthria/apraxia present                Objective:  /64   Pulse 88   Temp 98.1 °F (36.7 °C) (Oral)   Resp 17   Ht 5' 8.11\" (1.73 m)   Wt 138 lb 12.8 oz (63 kg)   SpO2 95%   BMI 21.04 kg/m²  I Body mass index is 21.04 kg/m².  I  96*   CO2 26 25   BUN 13 15   CREATININE 0.48* 0.57*     BNP: No results for input(s): BNP in the last 72 hours. PT/INR:   Recent Labs     04/25/19  0439   PROTIME 12.9*   INR 1.2     APTT: No results for input(s): APTT in the last 72 hours. CARDIAC ENZYMES: No results for input(s): CKMB, CKMBINDEX, TROPONINT in the last 72 hours. Invalid input(s): CKTOTAL;3  FASTING LIPID PANEL:  Lab Results   Component Value Date    CHOL 56 04/17/2019    HDL 13 (L) 04/17/2019    TRIG 107 04/17/2019     LIVER PROFILE:   Recent Labs     04/26/19  1451   AST 53*   *   BILIDIR 0.21   BILITOT 0.43   ALKPHOS 169*        I/O (24Hr): Intake/Output Summary (Last 24 hours) at 4/27/2019 1206  Last data filed at 4/26/2019 2330  Gross per 24 hour   Intake --   Output 440 ml   Net -440 ml       Glu last 24 hour  Recent Labs     04/26/19  1607 04/26/19 2013 04/27/19  0649 04/27/19  1115   POCGLU 181* 258* 188* 250*       No results for input(s): CLARITYU, COLORU, PHUR, SPECGRAV, PROTEINU, RBCUA, BLOODU, BACTERIA, NITRU, WBCUA, LEUKOCYTESUR, YEAST, GLUCOSEU, BILIRUBINUR in the last 72 hours. Other Diagnoses/plan:    1. Ambulatory and ADL dysfunction secondary to cardiac arrest, CABG, CVA, infected right lower extremity hardware, metabolic encephalopathy and respiratory failure-continue rehab efforts   2. Status PTA arrest, emergent cardiac cath, coronary artery disease, myopathy, CABG ×4 on 4/7-Lasix, lisinopril, metoprolol, spironolactone  3. Insomnia-addition of melatonin, if persist consider trazodone or Ambien  4. Status post respiratory arrest, vent, oxygen, extubated 4/19/19-Proventil O2 as needed  5. RLE hardware removal /22/19 osteomyelitis, right foot drop-ankle for orthoses, weightbearing as tolerated, doxycycline questionable length, leukocytosis trending down  6. Thrombocytosis-monitor  7. Anemia-improving-monitor  8.  Diabetes-insulin, sliding scale and glucose as above  9. multiple infarct/?right ICA stenting-aspirin and Plavix, Lipitor follow up with Dr. Parminder Garrido 5/21-decision on stenting at that time  10. Encephalopathy-Seroquel, currently on tele-sitter  11. GI-Ischemic hepatitis more also on Pepcid on Colace, MiraLAX Razia recheck LFTs-continue to improve  12. S/p  acute kidney injury improved  13. Pain-DC Percocet and Tylenol, change to oxycodone due to elevated liver enzymes  14. Multiple skin wounds, blackened healed, sacral stage II ulcer-wound care, PRAFO b/l, elevated heels decrease pressure to sacrum-consult wound care-await follow-up  15. DVTprophylaxis-Lovenox          Nhungyl Meigs. Adriana Escobar MD       This note is created with the assistance of a speech recognition program.  While intending to generate a document that actually reflects the content of the visit, the document can still have some errors including those of syntax and sound a like substitutions which may escape proof reading.   In such instances, actual meaning can be extrapolated by contextual diversion

## 2019-04-27 NOTE — OP NOTE
[address] [address]                                OPERATIVE REPORT    PATIENT NAME: Raz Oseguera                        :        1965  MED REC NO:   [id number]                             ROOM:       0835  ACCOUNT NO:   [de-identified]                           ADMIT DATE: 2019  PROVIDER:     Jaja Moffett    DATE OF PROCEDURE:  2019    IDENTIFICATION:  A 14-HOLX-NOL gentleman. PREOPERATIVE DIAGNOSES:  Delayed chest closure, planned return. POSTOPERATIVE DIAGNOSES:  Delayed chest closure, planned return. PROCEDURE:  Washout of delayed closure of chest.    ANESTHESIA:  General.    SURGEON:  Johnathon Markham MD    ASSISTED BY:  Kishore Ridley PA-C    BLOOD LOSS:  20 mL. COMPLICATIONS:  No intraoperative or postoperative complications. FINDINGS:  No issues with closure. Edema resolved. INDICATION FOR PROCEDURE:  The patient with severe coronary artery  disease, emergent CPR preop and OR salvage. Cath lab unable to place  supportive device and/or balloon pump or Impella on account of severe  peripheral vascular disease. The patient had CPR in the OR and prior  to, immediately placed on bypass. The patient underwent coronary vessel  bypass grafting x4. The patient had significant chest wall and cardiac  edema and labile. The patient was not adequate for chest closure at  that time with planned delay. The patient's chest was wound VAC'd with  plan to return to the OR within 24 hours. The patient was given  aggressive diuresis and aggressive medical management. DESCRIPTION OF PROCEDURE:  The patient was brought back to the operating  room, placed supine on the operating table. The patient was already  intubated and sedated. Deepened sedation was undertaken. The chest was  re-prepped. The wound VAC was removed. The patient's chest appeared  hemostatic.   The cardiac silhouette was markedly improved. There was  significant edema. We placed several sternal wires to reapproximate the  sternum to assess whether the patient could tolerate it being  permanently closed. The patient, after 30 minutes, had no change in  pressures and actually was doing quite well; therefore, after irrigation  and light debridement, we then re-closed the chest using six #6 sternal  wires. Injected 250 mL of Exparel along the periosteum. Of note, we  already had 2 chest tubes in place, which were cleaned and placed deep  to the pericardial well prior to chest closure. Deep sternal fascia and  linea alba were closed with 0 PDS suture. Skin was closed with 3-0 and  4-0 Monocryl. Staples were applied. The patient was then transferred  to the ICU. Needle and sponge counts were correct at the end of the  case. Estimated blood loss during the procedure maxed 20 mL; otherwise,  the patient tolerated the procedure quite well.         Saji Kirk    D: 04/26/2019 13:04:14       T: 04/26/2019 14:41:52     [email or web address]  Job#: [id number]     Doc#: [id number]

## 2019-04-27 NOTE — CONSULTS
Negative for cold intolerance. Genitourinary: Negative for dysuria and frequency. Musculoskeletal: Negative for back pain and arthralgias. Skin: Negative for color change and rash. Neurological: Negative for dizziness and headaches. Psychiatric/Behavioral: Negative for confusion. ROS  Physical exam     BP (!) 128/55   Pulse 99   Temp 98.2 °F (36.8 °C) (Oral)   Resp 18   Ht 5' 8.11\" (1.73 m)   Wt 138 lb 12.8 oz (63 kg)   SpO2 (!) 89% Comment: added NC   BMI 21.04 kg/m²      General appearance: well nourished in no distress  Eyes:  MARCELA   Head: AT/NC  ENT NAD   Neck: Trachea midline; supple  Lungs: normal effort, clear to auscultation. CVS sinus with no murmurs. Vasc No JVP, no carotid bruit  Abdomen: Soft, non-tender; no masses or HSM,   Extremities: + edema; no digital cyanosis or clubbing. Musculoskeletal NO joint effusion or synovitis  Skin: sternal incision clean,   Neurologic: Cranial nerves II-XII grossly intact; no le weakness  Psych: Appropriate affect, alert and oriented to person, place and time  NO lymphadenopathy            Relevant Labs/Imaging     CBC:   Recent Labs     04/26/19  0842   WBC 15.5*   HGB 10.2*   *     BMP:    Recent Labs     04/24/19  0421 04/25/19  0439 04/26/19  0842    139 134*   K 3.8 4.1 4.5    102 96*   CO2 25 26 25   BUN 9 13 15   CREATININE 0.43* 0.48* 0.57*   GLUCOSE 92 193* 350*     S. Calcium:  Recent Labs     04/26/19  0842   CALCIUM 8.8     Glycosylated hemoglobin A1C: No results for input(s): LABA1C in the last 72 hours. BNP:No results for input(s): BNP in the last 72 hours.          Assessment / Plan      Patient Active Problem List   Diagnosis    PAD (peripheral artery disease) (HCC)    Subacute osteomyelitis of right tibia (HCC)    Metabolic encephalopathy    Abnormal CT of the head    Hepatitis    Acute cerebral infarction associated with systemic hypoxia or ischemia    Multi infarct state    Carotid stenosis, bilateral  Right foot drop    Thrombocytosis (HCC)    [facility]A [facility]cerebrovascular accident) [facility] [address]    Moderate malnutrition ([facility] [address]     A/p  46 male s/p cardiac arrest - cath with multivessel disease s/p cabg, course complicated by renal failure, acute cva   CT had revealed scattered area of encephalomalacia in bifrontal and left parietal areas consistent with remote infarct and possible subacute infarct in the left lower parietal lobe. Also hardware removal of right tibia with I&D and sharp debridement of subcutaneous tissue and bone on 4/22/19- chronic osteo     Plan  Cont ASA lipitor plavix lasix lisinopril aldactone metoprolol   Started on lantus 30 units monitor fs   F/u lft abnormal      Chantel Washington MD  [address], [address] [place] [address].    Phone [phone]   Fax: [phone]  Answering Service: [phone]

## 2019-04-27 NOTE — PROGRESS NOTES
Pt awake most the night. Pt was calm and not impulsive. When asked if he was okay or needed anything. Pt replied he was just bored.

## 2019-04-28 LAB
ANION GAP SERPL CALCULATED.3IONS-SCNC: 10 MMOL/L (ref 9–17)
BUN BLDV-MCNC: 16 MG/DL (ref 6–20)
BUN/CREAT BLD: ABNORMAL (ref 9–20)
CALCIUM SERPL-MCNC: 8.6 MG/DL (ref 8.6–10.4)
CHLORIDE BLD-SCNC: 96 MMOL/L (ref 98–107)
CO2: 29 MMOL/L (ref 20–31)
CREAT SERPL-MCNC: 0.51 MG/DL (ref 0.7–1.2)
GFR AFRICAN AMERICAN: >60 ML/MIN
GFR NON-AFRICAN AMERICAN: >60 ML/MIN
GFR SERPL CREATININE-BSD FRML MDRD: ABNORMAL ML/MIN/{1.73_M2}
GFR SERPL CREATININE-BSD FRML MDRD: ABNORMAL ML/MIN/{1.73_M2}
GLUCOSE BLD-MCNC: 161 MG/DL (ref 75–110)
GLUCOSE BLD-MCNC: 186 MG/DL (ref 75–110)
GLUCOSE BLD-MCNC: 188 MG/DL (ref 75–110)
GLUCOSE BLD-MCNC: 209 MG/DL (ref 70–99)
GLUCOSE BLD-MCNC: 272 MG/DL (ref 75–110)
POTASSIUM SERPL-SCNC: 4.7 MMOL/L (ref 3.7–5.3)
SODIUM BLD-SCNC: 135 MMOL/L (ref 135–144)

## 2019-04-28 PROCEDURE — 97116 GAIT TRAINING THERAPY: CPT

## 2019-04-28 PROCEDURE — 6370000000 HC RX 637 (ALT 250 FOR IP): Performed by: NURSE PRACTITIONER

## 2019-04-28 PROCEDURE — 6370000000 HC RX 637 (ALT 250 FOR IP): Performed by: PHYSICAL MEDICINE & REHABILITATION

## 2019-04-28 PROCEDURE — 51798 US URINE CAPACITY MEASURE: CPT

## 2019-04-28 PROCEDURE — 36415 COLL VENOUS BLD VENIPUNCTURE: CPT

## 2019-04-28 PROCEDURE — 97535 SELF CARE MNGMENT TRAINING: CPT

## 2019-04-28 PROCEDURE — 97110 THERAPEUTIC EXERCISES: CPT

## 2019-04-28 PROCEDURE — 99232 SBSQ HOSP IP/OBS MODERATE 35: CPT | Performed by: PHYSICAL MEDICINE & REHABILITATION

## 2019-04-28 PROCEDURE — 99231 SBSQ HOSP IP/OBS SF/LOW 25: CPT | Performed by: INTERNAL MEDICINE

## 2019-04-28 PROCEDURE — 82947 ASSAY GLUCOSE BLOOD QUANT: CPT

## 2019-04-28 PROCEDURE — 1180000000 HC REHAB R&B

## 2019-04-28 PROCEDURE — 6360000002 HC RX W HCPCS: Performed by: NURSE PRACTITIONER

## 2019-04-28 PROCEDURE — 80048 BASIC METABOLIC PNL TOTAL CA: CPT

## 2019-04-28 PROCEDURE — 97530 THERAPEUTIC ACTIVITIES: CPT

## 2019-04-28 RX ORDER — QUETIAPINE FUMARATE 25 MG/1
25 TABLET, FILM COATED ORAL NIGHTLY
Status: DISCONTINUED | OUTPATIENT
Start: 2019-04-28 | End: 2019-05-01

## 2019-04-28 RX ORDER — TRAZODONE HYDROCHLORIDE 50 MG/1
50 TABLET ORAL NIGHTLY
Status: DISCONTINUED | OUTPATIENT
Start: 2019-04-28 | End: 2019-05-09 | Stop reason: HOSPADM

## 2019-04-28 RX ADMIN — DOCUSATE SODIUM 100 MG: 100 CAPSULE, LIQUID FILLED ORAL at 20:55

## 2019-04-28 RX ADMIN — INSULIN LISPRO 3 UNITS: 100 INJECTION, SOLUTION INTRAVENOUS; SUBCUTANEOUS at 20:58

## 2019-04-28 RX ADMIN — FUROSEMIDE 40 MG: 40 TABLET ORAL at 16:23

## 2019-04-28 RX ADMIN — POTASSIUM CHLORIDE 40 MEQ: 1500 TABLET, EXTENDED RELEASE ORAL at 07:46

## 2019-04-28 RX ADMIN — ATORVASTATIN CALCIUM 40 MG: 40 TABLET, FILM COATED ORAL at 20:55

## 2019-04-28 RX ADMIN — QUETIAPINE FUMARATE 25 MG: 25 TABLET ORAL at 20:55

## 2019-04-28 RX ADMIN — CLOPIDOGREL BISULFATE 75 MG: 75 TABLET ORAL at 07:46

## 2019-04-28 RX ADMIN — LISINOPRIL 5 MG: 5 TABLET ORAL at 07:46

## 2019-04-28 RX ADMIN — DOXYCYCLINE 100 MG: 100 CAPSULE ORAL at 20:52

## 2019-04-28 RX ADMIN — POTASSIUM CHLORIDE 40 MEQ: 1500 TABLET, EXTENDED RELEASE ORAL at 16:23

## 2019-04-28 RX ADMIN — DOXYCYCLINE 100 MG: 100 CAPSULE ORAL at 07:45

## 2019-04-28 RX ADMIN — INSULIN LISPRO 2 UNITS: 100 INJECTION, SOLUTION INTRAVENOUS; SUBCUTANEOUS at 11:51

## 2019-04-28 RX ADMIN — METOPROLOL TARTRATE 50 MG: 50 TABLET ORAL at 20:53

## 2019-04-28 RX ADMIN — TRAZODONE HYDROCHLORIDE 50 MG: 50 TABLET ORAL at 20:55

## 2019-04-28 RX ADMIN — FUROSEMIDE 40 MG: 40 TABLET ORAL at 07:46

## 2019-04-28 RX ADMIN — DOCUSATE SODIUM 100 MG: 100 CAPSULE, LIQUID FILLED ORAL at 07:46

## 2019-04-28 RX ADMIN — INSULIN GLARGINE 30 UNITS: 100 INJECTION, SOLUTION SUBCUTANEOUS at 07:47

## 2019-04-28 RX ADMIN — SPIRONOLACTONE 25 MG: 25 TABLET, FILM COATED ORAL at 07:45

## 2019-04-28 RX ADMIN — ASPIRIN 81 MG 81 MG: 81 TABLET ORAL at 07:46

## 2019-04-28 RX ADMIN — ENOXAPARIN SODIUM 40 MG: 100 INJECTION SUBCUTANEOUS at 07:46

## 2019-04-28 RX ADMIN — OXYCODONE HYDROCHLORIDE 5 MG: 5 TABLET ORAL at 20:55

## 2019-04-28 RX ADMIN — METOPROLOL TARTRATE 50 MG: 50 TABLET ORAL at 07:46

## 2019-04-28 RX ADMIN — INSULIN LISPRO 2 UNITS: 100 INJECTION, SOLUTION INTRAVENOUS; SUBCUTANEOUS at 16:23

## 2019-04-28 RX ADMIN — FAMOTIDINE 20 MG: 20 TABLET ORAL at 07:46

## 2019-04-28 RX ADMIN — FAMOTIDINE 20 MG: 20 TABLET ORAL at 20:55

## 2019-04-28 RX ADMIN — MULTIPLE VITAMINS W/ MINERALS TAB 1 TABLET: TAB at 07:45

## 2019-04-28 RX ADMIN — INSULIN LISPRO 2 UNITS: 100 INJECTION, SOLUTION INTRAVENOUS; SUBCUTANEOUS at 07:47

## 2019-04-28 ASSESSMENT — PAIN SCALES - GENERAL
PAINLEVEL_OUTOF10: 0
PAINLEVEL_OUTOF10: 4

## 2019-04-28 ASSESSMENT — PAIN DESCRIPTION - DESCRIPTORS: DESCRIPTORS: ACHING;SORE

## 2019-04-28 ASSESSMENT — PAIN DESCRIPTION - FREQUENCY: FREQUENCY: CONTINUOUS

## 2019-04-28 ASSESSMENT — PAIN DESCRIPTION - ONSET: ONSET: ON-GOING

## 2019-04-28 ASSESSMENT — PAIN DESCRIPTION - LOCATION: LOCATION: GENERALIZED

## 2019-04-28 ASSESSMENT — PAIN DESCRIPTION - PAIN TYPE: TYPE: SURGICAL PAIN

## 2019-04-28 ASSESSMENT — PAIN - FUNCTIONAL ASSESSMENT: PAIN_FUNCTIONAL_ASSESSMENT: ACTIVITIES ARE NOT PREVENTED

## 2019-04-28 NOTE — PROGRESS NOTES
Physical Medicine & Rehabilitation  Progress Note    4/28/2019 11:52 AM     CC: Ambulatory and ADL dysfunction due to cardiac arrest, CABG, CVA, infected right lower extremity hardware, metabolic encephalopathy and respiratory failure    Subjective:   No complaints. Minimal no sleep per nursing. Patient appears comfortable today. No complaints. Positive BM 4/28 low blood pressure and therapy-improved with oral fluids    ROS:  Denies fevers, chills, sweats. No chest pain, palpitations, lightheadedness. Denies coughing, wheezing or shortness of breath. Denies abdominal pain, nausea, diarrhea or constipation. No new areas of joint pain. Denies new areas of numbness or weakness. Denies new anxiety or depression issues. No new skin problems. Rehabilitation:   PT:  Restrictions/Precautions: Cardiac, General Precautions, Surgical Protocols, Fall Risk(telesitter)  Sternal Precautions: Emergent CABG X4  Other position/activity restrictions: Fall Risk  Required Braces or Orthoses  Other: Heart Hugger Brace  Right Lower Extremity Brace: Ankle Foot Orthotics  RLE Brace Type: Profo boot and AFO  Left Lower Extremity Brace: Boot  LLE Brace Type: Profo boot   Transfers  Sit to Stand: Minimal Assistance  Stand to sit: Minimal Assistance, Contact guard assistance  Bed to Chair: Minimal assistance, Moderate assistance  Stand Pivot Transfers: Contact guard assistance(w/RW)  Comment: Pt used RW. Pt needs verbal cueing for hand placement during transfers with little carry over. At times pt lose balance posteriorly  Ambulation 1  Surface: level tile  Device: Rolling Walker  Other Apparatus: Wheelchair follow  Assistance: Contact guard assistance  Quality of Gait: decreased ton, decreased step length, R foot drop, decreased endurance  Distance: 100ft  Comments: Pt reported feeling light headed toward end of walk.  SPO2 96%, HR 88bpm BP 90/55    Transfers  Sit to Stand: Minimal Assistance  Stand to sit: Minimal Assistance, Contact guard assistance  Bed to Chair: Minimal assistance, Moderate assistance  Stand Pivot Transfers: Contact guard assistance(w/RW)  Comment: Pt used RW. Pt needs verbal cueing for hand placement during transfers with little carry over. At times pt lose balance posteriorly  Ambulation  Ambulation?: Yes  More Ambulation?: Yes  Ambulation 1  Surface: level tile  Device: Rolling Walker  Other Apparatus: Wheelchair follow  Assistance: Contact guard assistance  Quality of Gait: decreased ton, decreased step length, R foot drop, decreased endurance  Distance: 100ft  Comments: Pt reported feeling light headed toward end of walk. SPO2 96%, HR 88bpm BP 90/55    Surface: level tile  Ambulation 1  Surface: level tile  Device: Rolling Walker  Other Apparatus: Wheelchair follow  Assistance: Contact guard assistance  Quality of Gait: decreased ton, decreased step length, R foot drop, decreased endurance  Distance: 100ft  Comments: Pt reported feeling light headed toward end of walk.  SPO2 96%, HR 88bpm BP 90/55        OT:  OT FIM:   Groomin - Requires setup/cues to do all tasks  Bathin - Able to bathe 8-9 areas  Dressing-Upper: 5 - Requires setup/supervision/cues and/or requires assist with presthesis/brace only  Dressing-Lower: 3 - Requires assist with 2-3 parts of dressing(wears AFO RLE)  Toiletin - Did not occur  Shower Transfer: 0 - Activity does not occur(pt declined shower washed sinkside )      ST:    Cognitive Diagnosis: Cognitive evaluation revealed deficits in the areas of immediate/delayed recall, abstract reasoning (deductive/inductive), numeric reasoning, problem solving, and thought flexibility   Aphasia Diagnosis: generative naming deficits noted, mild anomia in conversation  Speech Diagnosis: no dysarthria/apraxia present                Objective:  BP (!) 107/57   Pulse 70   Temp 98.1 °F (36.7 °C) (Oral)   Resp 16   Ht 5' 8.11\" (1.73 m)   Wt 138 lb 12.8 oz (63 kg)   SpO2 96%   BMI glycol  17 g Oral Daily     Continuous Infusions:   dextrose       PRN Meds:.melatonin ER, oxyCODONE **OR** oxyCODONE, dextrose, dextrose, glucagon (rDNA), glucose, magnesium hydroxide, albuterol, bisacodyl     Diagnostics:     CBC:   Recent Labs     04/26/19  0842   WBC 15.5*   RBC 3.44*   HGB 10.2*   HCT 33.1*   MCV 96.3   RDW 14.7   *     BMP:   Recent Labs     04/26/19  0842 04/28/19  0737   * 135   K 4.5 4.7   CL 96* 96*   CO2 25 29   BUN 15 16   CREATININE 0.57* 0.51*     BNP: No results for input(s): BNP in the last 72 hours. PT/INR:   No results for input(s): PROTIME, INR in the last 72 hours. APTT: No results for input(s): APTT in the last 72 hours. CARDIAC ENZYMES: No results for input(s): CKMB, CKMBINDEX, TROPONINT in the last 72 hours. Invalid input(s): CKTOTAL;3  FASTING LIPID PANEL:  Lab Results   Component Value Date    CHOL 56 04/17/2019    HDL 13 (L) 04/17/2019    TRIG 107 04/17/2019     LIVER PROFILE:   Recent Labs     04/26/19  1451   AST 53*   *   BILIDIR 0.21   BILITOT 0.43   ALKPHOS 169*        I/O (24Hr): Intake/Output Summary (Last 24 hours) at 4/28/2019 1152  Last data filed at 4/28/2019 0642  Gross per 24 hour   Intake --   Output 1900 ml   Net -1900 ml       Glu last 24 hour  Recent Labs     04/27/19  1630 04/27/19  2054 04/28/19  0639 04/28/19  1126   POCGLU 174* 371* 161* 186*       No results for input(s): CLARITYU, COLORU, PHUR, SPECGRAV, PROTEINU, RBCUA, BLOODU, BACTERIA, NITRU, WBCUA, LEUKOCYTESUR, YEAST, GLUCOSEU, BILIRUBINUR in the last 72 hours. Other Diagnoses/plan:    1. Ambulatory and ADL dysfunction secondary to cardiac arrest, CABG, CVA, infected right lower extremity hardware, metabolic encephalopathy and respiratory failure-continue rehab efforts   2. Status PTA arrest, emergent cardiac cath, coronary artery disease, myopathy, CABG ×4 on 4/7-Lasix, lisinopril, metoprolol, spironolactone  3.  Status post respiratory arrest, vent, oxygen, extubated 4/19/19-Proventil O2 as needed  4. RLE hardware removal /22/19 , chronic leg osteomyelitis, right foot drop-ankle foot orthoses, weightbearing as tolerated, doxycycline questionable length, leukocytosis trending down  5. Thrombocytosis-monitor  6. Anemia-improving-monitor  7. Diabetes-insulin, sliding scale and glucose as above  8. multiple infarct/?right ICA stenting-aspirin and Plavix, Lipitor follow up with Dr. Michelet Aguiar 5/21-decision on stenting at that time  9. Encephalopathy-insomnia- currently on tele-sitter, trial begin decrease Seroquel , still decreased sleep with melatonin, will DC melatonin and try trazodone  10. GI-Ischemic hepatitis more also on Pepcid on Colace, MiraLAX , recheck LFTs-continue to improve  11. S/p  acute kidney injury improved  12. Pain-off Percocet and Tylenol, changed to oxycodone due to elevated liver enzymes  13. Multiple skin wounds, blackened healed, sacral stage II ulcer-wound care, PRAFO b/l, elevated heels decrease pressure to sacrum-consult wound care-await follow-up, wounds all. Clean, clarify orders for questionable packing right leg-with orthopedics and await wound care  14. DVTprophylaxis-[place]          Ethan Campbell. Desean Lyle MD       This note is created with the assistance of a speech recognition program.  While intending to generate a document that actually reflects the content of the visit, the document can still have some errors including those of syntax and sound a like substitutions which may escape proof reading.   In such instances, actual meaning can be extrapolated by contextual diversion

## 2019-04-28 NOTE — PLAN OF CARE
Problem: Falls - Risk of:  Goal: Will remain free from falls  Description  Will remain free from falls  4/28/2019 1427 by Theresa Mike RN  Outcome: Met This Shift  Note:   No falls noted this shift. Patient ambulates with x1 staff assistance without difficulty. Bed kept in low position. Safe environment maintained. Bedside table & call light in reach. Uses call light appropriately when needing assistance. 4/28/2019 0418 by Shane Aguilar RN  Outcome: Ongoing     Problem: Risk for Impaired Skin Integrity  Goal: Tissue integrity - skin and mucous membranes  Description  Structural intactness and normal physiological function of skin and  mucous membranes. 4/28/2019 1427 by Theresa Mike RN  Outcome: Ongoing  Note:   Skin assessment performed. See head to toe assessment. Will continue to monitor. 4/28/2019 0418 by Shane Aguilar RN  Outcome: Ongoing     Problem: Pain:  Goal: Pain level will decrease  Description  Pain level will decrease  4/28/2019 1427 by Theresa Mike RN  Outcome: Ongoing  Note:   Pt medicated with pain medication prn. Assessed all pain characteristics including level, type, location, frequency, and onset. Non-pharmacologic interventions offered to pt as well. Pt states pain is tolerable at this time. Will continue to monitor.     4/28/2019 0418 by Shane Aguilar RN  Outcome: Ongoing     Problem: Nutrition  Goal: Optimal nutrition therapy  Outcome: Ongoing

## 2019-04-28 NOTE — PLAN OF CARE
Problem: Risk for Impaired Skin Integrity  Goal: Tissue integrity - skin and mucous membranes  Description  Structural intactness and normal physiological function of skin and  mucous membranes. Outcome: Ongoing   Patient refusing Prafo boots HS to protect heels from further injury. Patient educated on importance of wearing devices. Continues to refuse. No new skin issues this shift. Skin integrity maintained. Safety maintained this shift. Will continue to monitor. Problem: Falls - Risk of:  Goal: Will remain free from falls  Description  Will remain free from falls  Outcome: Ongoing  Patient remains free from falls/injuries at this time. Call light within reach. Safety maintained this shift. Will continue to monitor. Problem: Pain:  Goal: Pain level will decrease  Description  Pain level will decrease  Outcome: Ongoing  Patient denies pain so far this shift. Safety maintained this shift. Will continue to monitor.

## 2019-04-28 NOTE — PROGRESS NOTES
Physical Therapy  [address]   Acute Rehabilitation Physical Therapy Progress Note    Date: 19  Patient Name: Adeel Frazier       Room: 9122/6372-78  MRN: [id number]   Account: [de-identified]   : 1965  (46 y.o.) Gender: male     Referring Practitioner: Lisa Trevino MD  Diagnosis: Acute CVA, cardiac arrest, CABG  Past Medical History:  has a past medical history of CAD (coronary artery disease), Cardiac arrest ([facility] [address] Osteolysis, PEA (Pulseless electrical activity) ([facility] [address] and PVD (peripheral vascular disease) ([facility] [address] Past Surgical History:   has a past surgical history that includes Cardiac catheterization (2019); Femur Surgery; Tonsillectomy; Coronary artery bypass graft (2019); Hardware Removal (Right, 2019); Tibia fracture surgery (Right); Coronary artery bypass graft (N/A, 2019); RECONSTRUCTIVE REPAIR STERNAL (N/A, 2019); Hardware Removal (Right, 2019); and REMOVE HARDWARE FEMUR (N/A, 2019). Additional Pertinent Hx: Pt admitted to [facility] ED 19 presenting with cardiac arrest and increased SOB. Pt underwent emergent CABG x4 19 with delayed closure on 19. Pt was intubated from 19 to 19. Pt also underwent screw removal from ORIF of R knee bedside on 19. Hardware removal of R tibia and I&D on 19. Pt admitted to [address] [place] [address] for intense rehabiliation on 19. 19 MRI shows R cerebellum infarct. CT on 19 shows Lower parietal infarct. Overall Orientation Status: Within Functional Limits  Restrictions/Precautions  Restrictions/Precautions: Cardiac;General Precautions;Surgical Protocols; Fall Risk(telesitter)  Required Braces or Orthoses?: Yes(R AFO, R procare boot)  Required Braces or Orthoses  Other: Heart Hugger Brace  Right Lower Extremity Brace:  Ankle Foot Orthotics  RLE Brace Type: Profo boot and AFO  Left Lower Extremity Brace: Boot  LLE Brace Type: Profo boot  Position Activity Restriction  Sternal TRANSFERS  Bed, Chair, Wheel Chair: 4 - Requires steadying assistance only <25% assist  and/or requires assist with one leg only   LOCOMOTION  Primary Mode: Walk  Distance Walked: 100ft  Walk: 2 - Maximal Assistance Requires up to Maximal Assistance AND requires assistance of one person to walk between  feet (Patient performs 25-49% of locomotion effort or goes between  feet)  Stairs: 2- Maximal Assistance Performs 25-49% of the effort to go up and down 4 to 6 stairs and requires the assistance of one person only       BALANCE Posture: Fair  Sitting - Static: Good  Sitting - Dynamic: Good  Standing - Static: Good;-(w/RW)  Standing - Dynamic: Fair;+(w/RW)    EXERCISES    Other exercises?: Yes  Other exercises 1: Supine (B) LE ex x 15; 1.5# (B)(weights removed for SLR's; breaks taken prn)  Other exercises 2: Bridging: DL x 15, SL x 10ea  Other exercises 3: sit<>stand from EOB x 5  Other exercises 4: standing (B) LE ex x 10 at RW(rest breaks taken prn)  Other exercises 5: NU-step L4 x 6min; (B) LE only           Activity Tolerance: Patient limited by endurance, Patient limited by fatigue     Other Comments  Comments: C/O dizziness with amb. BP 90/55. Had pt lye down on mat table to complete supine ex. Once pt sat up c/o dizziness again , however worse this time BP 76/51. Pt assited back to chair and taken back to his room and notified MARIA E Stokes of pt's low BP's.     Patient Education  New Education Provided:    Learner:patient  Method: demonstration and explanation       Outcome: demonstrated understanding and needs reinforcement     Current Treatment Recommendations: ROM, Strengthening, Functional Mobility Training, Endurance Training, Transfer Training, Gait Training, Stair training, Balance Training, Neuromuscular Re-education, Home Exercise Program, Safety Education & Training, Patient/Caregiver Education & Training, Equipment Evaluation, Education, & procurement    Conditions Requiring Skilled Therapeutic Intervention  Body structures, Functions, Activity limitations: Decreased functional mobility ; Decreased strength;Decreased endurance;Decreased coordination;Decreased safe awareness;Decreased cognition;Decreased ROM; Decreased balance  Treatment Diagnosis: decreased functional mobility  Prognosis: Good  REQUIRES PT FOLLOW UP: Yes  Discharge Recommendations: Home with assist PRN    Goals  Short term goals  Time Frame for Short term goals: 1 week  Short term goal 1: Pt to perform bed mobility mod I  Short term goal 2: Pt to perform transfers CGA with use of RW  Short term goal 3: Pt to amb  ft with RW and CGA with no rest break  Short term goal 4: Pt to ascend/descend 5 steps with bilateral handrail and min A x1  Long term goals  Time Frame for Long term goals : by discharge  Long term goal 1: Pt to perform bed mobility independent  Long term goal 2: pt to perform transfers with least restrictive DME and supervision  Long term goal 3: pt to amb with least restrictive -250 ft supervision  Long term goal 4: Pt to ascend/descend a flight of stairs with R handrail supervision  Long term goal 5: Pt to demonstrate understanding of HEP       04/28/19 0933 04/28/19 1452   PT Individual Minutes   Time In 0830 1245   Time Out 0920 1330   Minutes 50 45       Electronically signed by Alexx Kovacs on 4/28/19 at 2:53 PM

## 2019-04-28 NOTE — PROGRESS NOTES
Patient returned from therapy, therapist stated that patient felt dizzy in therapy and his pressure was systolically in the 80P. Patient sitting in wheelchair in his room, states he feels dizzy but it has improved. New vitals obtained, pressure of 87/47 HR 75. Patient instructed to rest and drink lots of fluids. RN will recheck vitals and continue to assess patient. Will discuss with physician. Lopressor 50mg and lisinopril 5mg given at 0746 per orders. BP this AM was 132/63 HR 86.

## 2019-04-28 NOTE — PROGRESS NOTES
[facility]    Progress Note    4/27/2019    9:29 PM    Name:   Channing De La Cruz  MRN:     [id number]     Acct:      [de-identified]   Room:   [address] [place] [address] Day:  2  Admit Date:  4/25/2019  7:49 PM    PCP:   No primary care provider on file. Code Status:  Full Code    Subjective:     C/C: No chief complaint on file. Interval History Status: improved. HPI:   Patient had a cardiac arrest, found to have multivessel coronary artery disease, had CABG, patient had Stroke with Speech Difficulties   Infected Hard ware was Removed From Right leg     Review of Systems:     Constitutional:  negative for chills, fevers, sweats  Respiratory:  negative for cough, dyspnea on exertion, hemoptysis, shortness of breath, wheezing  Cardiovascular:  negative for chest pain, chest pressure/discomfort, lower extremity edema, palpitations  Gastrointestinal:  negative for abdominal pain, constipation, diarrhea, nausea, vomiting  Neurological:  Speech difficulties, foot drop present in right leg    Medications:      Allergies:  No Known Allergies    Current Meds:   Scheduled Meds:    enoxaparin  40 mg Subcutaneous Daily    atorvastatin  40 mg Oral Nightly    clopidogrel  75 mg Oral Daily    famotidine  20 mg Oral BID    therapeutic multivitamin-minerals  1 tablet Oral Daily with breakfast    aspirin  81 mg Oral Daily    docusate  100 mg Oral Daily    doxycycline monohydrate  100 mg Oral 2 times per day    furosemide  40 mg Oral BID    insulin glargine  30 Units Subcutaneous Daily    insulin lispro  0-12 Units Subcutaneous TID WC    insulin lispro  0-6 Units Subcutaneous Nightly    lisinopril  5 mg Oral Daily    metoprolol tartrate  50 mg Oral BID    potassium chloride  40 mEq Oral BID WC    QUEtiapine  50 mg Oral Nightly    spironolactone  25 mg Oral Daily    docusate sodium  100 mg Oral BID    polyethylene glycol  17 g Oral Daily     Continuous Infusions:    dextrose Splint present   Skin:  no gross lesions, rashes, induration    Assessment:        Primary Problem  <principal problem not specified>    Active Hospital Problems    Diagnosis Date Noted    Moderate malnutrition ([facility] [E44.0] 04/26/2019    Acute CVA (cerebrovascular accident) ([facility] [I63.9] 04/25/2019    Acute cerebral infarction associated with systemic hypoxia or ischemia [I63.89]     Subacute osteomyelitis of right tibia ([facility] [Y45.316]        Plan:        1. Cardiac arrest, status post CABG with multivessel coronary artery disease  2. Acute stroke. Speech difficulties, patient is on aspirin, Lipitor, Plavix  3. Congestive heart failure, both systolic and diastolic, ejection fraction is 40%, patient is on Lasix, Aldactone, lisinopril, Lopressor  4. Infected implant removed from right leg, she was on doxycycline  5. Diabetes, controlled, on sliding scale and long-acting insulin  6.  On DVT prophylaxis      Ellen Villarreal MD  4/27/2019  9:29 PM

## 2019-04-29 LAB
ANION GAP SERPL CALCULATED.3IONS-SCNC: 12 MMOL/L (ref 9–17)
BUN BLDV-MCNC: 20 MG/DL (ref 6–20)
BUN/CREAT BLD: ABNORMAL (ref 9–20)
CALCIUM SERPL-MCNC: 8.9 MG/DL (ref 8.6–10.4)
CHLORIDE BLD-SCNC: 92 MMOL/L (ref 98–107)
CO2: 26 MMOL/L (ref 20–31)
CREAT SERPL-MCNC: 0.55 MG/DL (ref 0.7–1.2)
GFR AFRICAN AMERICAN: >60 ML/MIN
GFR NON-AFRICAN AMERICAN: >60 ML/MIN
GFR SERPL CREATININE-BSD FRML MDRD: ABNORMAL ML/MIN/{1.73_M2}
GFR SERPL CREATININE-BSD FRML MDRD: ABNORMAL ML/MIN/{1.73_M2}
GLUCOSE BLD-MCNC: 227 MG/DL (ref 75–110)
GLUCOSE BLD-MCNC: 235 MG/DL (ref 70–99)
GLUCOSE BLD-MCNC: 267 MG/DL (ref 75–110)
GLUCOSE BLD-MCNC: 314 MG/DL (ref 75–110)
GLUCOSE BLD-MCNC: 374 MG/DL (ref 75–110)
HCT VFR BLD CALC: 33.7 % (ref 41–53)
HEMOGLOBIN: 11.2 G/DL (ref 13.5–17.5)
MCH RBC QN AUTO: 30.5 PG (ref 26–34)
MCHC RBC AUTO-ENTMCNC: 33.2 G/DL (ref 31–37)
MCV RBC AUTO: 91.9 FL (ref 80–100)
NRBC AUTOMATED: ABNORMAL PER 100 WBC
PDW BLD-RTO: 14.8 % (ref 11.5–14.9)
PLATELET # BLD: 613 K/UL (ref 150–450)
PMV BLD AUTO: 8.2 FL (ref 6–12)
POTASSIUM SERPL-SCNC: 4.8 MMOL/L (ref 3.7–5.3)
RBC # BLD: 3.67 M/UL (ref 4.5–5.9)
SODIUM BLD-SCNC: 130 MMOL/L (ref 135–144)
WBC # BLD: 12.1 K/UL (ref 3.5–11)

## 2019-04-29 PROCEDURE — 6370000000 HC RX 637 (ALT 250 FOR IP): Performed by: PHYSICAL MEDICINE & REHABILITATION

## 2019-04-29 PROCEDURE — 80048 BASIC METABOLIC PNL TOTAL CA: CPT

## 2019-04-29 PROCEDURE — 85027 COMPLETE CBC AUTOMATED: CPT

## 2019-04-29 PROCEDURE — 97110 THERAPEUTIC EXERCISES: CPT

## 2019-04-29 PROCEDURE — 97530 THERAPEUTIC ACTIVITIES: CPT

## 2019-04-29 PROCEDURE — 6360000002 HC RX W HCPCS: Performed by: NURSE PRACTITIONER

## 2019-04-29 PROCEDURE — 6370000000 HC RX 637 (ALT 250 FOR IP): Performed by: INTERNAL MEDICINE

## 2019-04-29 PROCEDURE — 97127 HC SP THER IVNTJ W/FOCUS COG FUNCJ: CPT

## 2019-04-29 PROCEDURE — 6370000000 HC RX 637 (ALT 250 FOR IP): Performed by: NURSE PRACTITIONER

## 2019-04-29 PROCEDURE — 97116 GAIT TRAINING THERAPY: CPT

## 2019-04-29 PROCEDURE — 97535 SELF CARE MNGMENT TRAINING: CPT

## 2019-04-29 PROCEDURE — 1180000000 HC REHAB R&B

## 2019-04-29 PROCEDURE — 99232 SBSQ HOSP IP/OBS MODERATE 35: CPT | Performed by: PHYSICAL MEDICINE & REHABILITATION

## 2019-04-29 PROCEDURE — 82947 ASSAY GLUCOSE BLOOD QUANT: CPT

## 2019-04-29 PROCEDURE — 99232 SBSQ HOSP IP/OBS MODERATE 35: CPT | Performed by: INTERNAL MEDICINE

## 2019-04-29 PROCEDURE — 36415 COLL VENOUS BLD VENIPUNCTURE: CPT

## 2019-04-29 RX ORDER — INSULIN GLARGINE 100 [IU]/ML
35 INJECTION, SOLUTION SUBCUTANEOUS DAILY
Status: DISCONTINUED | OUTPATIENT
Start: 2019-04-30 | End: 2019-05-05

## 2019-04-29 RX ADMIN — DOXYCYCLINE 100 MG: 100 CAPSULE ORAL at 07:24

## 2019-04-29 RX ADMIN — INSULIN LISPRO 4 UNITS: 100 INJECTION, SOLUTION INTRAVENOUS; SUBCUTANEOUS at 07:28

## 2019-04-29 RX ADMIN — DOCUSATE SODIUM 100 MG: 100 CAPSULE, LIQUID FILLED ORAL at 21:24

## 2019-04-29 RX ADMIN — DOXYCYCLINE 100 MG: 100 CAPSULE ORAL at 21:23

## 2019-04-29 RX ADMIN — QUETIAPINE FUMARATE 25 MG: 25 TABLET ORAL at 21:24

## 2019-04-29 RX ADMIN — MULTIPLE VITAMINS W/ MINERALS TAB 1 TABLET: TAB at 07:24

## 2019-04-29 RX ADMIN — DOCUSATE SODIUM 100 MG: 50 LIQUID ORAL at 07:24

## 2019-04-29 RX ADMIN — OXYCODONE HYDROCHLORIDE 5 MG: 5 TABLET ORAL at 21:24

## 2019-04-29 RX ADMIN — ATORVASTATIN CALCIUM 40 MG: 40 TABLET, FILM COATED ORAL at 21:24

## 2019-04-29 RX ADMIN — INSULIN LISPRO 4 UNITS: 100 INJECTION, SOLUTION INTRAVENOUS; SUBCUTANEOUS at 21:24

## 2019-04-29 RX ADMIN — FAMOTIDINE 20 MG: 20 TABLET ORAL at 07:24

## 2019-04-29 RX ADMIN — INSULIN LISPRO 10 UNITS: 100 INJECTION, SOLUTION INTRAVENOUS; SUBCUTANEOUS at 11:54

## 2019-04-29 RX ADMIN — INSULIN GLARGINE 30 UNITS: 100 INJECTION, SOLUTION SUBCUTANEOUS at 07:28

## 2019-04-29 RX ADMIN — ASPIRIN 81 MG 81 MG: 81 TABLET ORAL at 07:24

## 2019-04-29 RX ADMIN — POTASSIUM CHLORIDE 40 MEQ: 1500 TABLET, EXTENDED RELEASE ORAL at 07:24

## 2019-04-29 RX ADMIN — INSULIN LISPRO 6 UNITS: 100 INJECTION, SOLUTION INTRAVENOUS; SUBCUTANEOUS at 16:24

## 2019-04-29 RX ADMIN — ENOXAPARIN SODIUM 40 MG: 100 INJECTION SUBCUTANEOUS at 07:24

## 2019-04-29 RX ADMIN — METOPROLOL TARTRATE 25 MG: 25 TABLET ORAL at 21:24

## 2019-04-29 RX ADMIN — TRAZODONE HYDROCHLORIDE 50 MG: 50 TABLET ORAL at 21:24

## 2019-04-29 RX ADMIN — FAMOTIDINE 20 MG: 20 TABLET ORAL at 21:24

## 2019-04-29 RX ADMIN — CLOPIDOGREL BISULFATE 75 MG: 75 TABLET ORAL at 07:24

## 2019-04-29 ASSESSMENT — PAIN SCALES - GENERAL
PAINLEVEL_OUTOF10: 2
PAINLEVEL_OUTOF10: 0
PAINLEVEL_OUTOF10: 0
PAINLEVEL_OUTOF10: 6

## 2019-04-29 ASSESSMENT — PAIN DESCRIPTION - PAIN TYPE: TYPE: SURGICAL PAIN

## 2019-04-29 ASSESSMENT — PAIN DESCRIPTION - ORIENTATION: ORIENTATION: MID

## 2019-04-29 ASSESSMENT — PAIN DESCRIPTION - LOCATION: LOCATION: CHEST

## 2019-04-29 ASSESSMENT — PAIN DESCRIPTION - DESCRIPTORS: DESCRIPTORS: DULL

## 2019-04-29 NOTE — PROGRESS NOTES
Nutrition Assessment    Type and Reason for Visit: Reassess    Nutrition Recommendations: Continue current diet. Supplements changed to Ensure High Protein. Nutrition Assessment: Nutritional intake is improving. Pt remains at risk for further nutrition compromise due to increased needs for healing, diabetes, and cognitive issues. Oral nutrition supplements changed to lower carbohydrate type. Malnutrition Assessment:  · Malnutrition Status: Meets the criteria for moderate malnutrition  · Context: Acute illness or injury  · Findings of the 6 clinical characteristics of malnutrition (Minimum of 2 out of 6 clinical characteristics is required to make the diagnosis of moderate or severe Protein Calorie Malnutrition based on AND/ASPEN Guidelines):  1. Energy Intake-Less than or equal to 50% of estimated energy requirement(Improving), Greater than or equal to 7 days    2. Weight Loss-20% loss or greater, (in 3 weeks)  3. Fat Loss-Moderate subcutaneous fat loss, Orbital, Fat overlying the ribs  4. Muscle Loss-Moderate muscle mass loss, Temples (temporalis muscle), Clavicles (pectoralis and deltoids), Interosseous  5. Fluid Accumulation-(Moderate fluid accumulation), Extremities, Generalized  6.  Strength-Not measured    Nutrition Risk Level: Moderate    Nutrient Needs:  · Estimated Daily Total Kcal: 8508-6016 kcal based on 32-34 kcal/kg of admission weight  · Estimated Daily Protein (g):  gm based on 1.4-1.6 gm/kg of ideal body weight    Nutrition Diagnosis:   · Problem: Inadequate oral intake  · Etiology: related to Cardiac dysfunction     Signs and symptoms:  as evidenced by Weight loss, Moderate loss of subcutaneous fat, Moderate muscle loss, Diet history of poor intake    Objective Information:  · Nutrition-Focused Physical Findings: Edema: Trace generalized, RUE, LUE; +2 RLE, LLE.    · Wound Type: Multiple, Pressure Ulcer, Stage II, Unstageable, Surgical Wound, Deep Tissue Injury  · Current Nutrition Therapies:  · Oral Diet Orders: Carb Control 4 Carbs/Meal   · Oral Diet intake: %  · Oral Nutrition Supplement (ONS) Orders: Standard High Calorie Oral Supplement  · ONS intake: %  · Anthropometric Measures:  · Ht: 5' 8.11\" (173 cm)   · Current Body Wt: 138 lb 12.8 oz (63 kg)  · Admission Body Wt: 140 lb (63.5 kg)  · Usual Body Wt: 175 lb (79.4 kg)  · % Weight Change:  ,  21% loss in 3 weeks  · Ideal Body Wt: 154 lb (69.9 kg), % Ideal Body 91% of admission weight  · BMI Classification: BMI 18.5 - 24.9 Normal Weight    Nutrition Interventions:   Continue current diet, Modify current ONS  Continued Inpatient Monitoring, Education not appropriate at this time    Nutrition Evaluation:   · Evaluation: Progressing toward goals   · Goals: PO intake % of meals and supplements    · Monitoring: Meal Intake, Supplement Intake, Diet Tolerance, Wound Healing, I&O, Mental Status/Confusion, Weight, Pertinent Labs    Madonna Norton R.D., L.D.   Phone: [phone]'

## 2019-04-29 NOTE — PROGRESS NOTES
Physical Therapy  Facility/Department: [facility] ACUTE REHAB  Daily Treatment Note  NAME: Shanell Richardson  : 1965  MRN: [id number]    Date of Service: 2019    Discharge Recommendations:  Home with assist PRN        Patient Diagnosis(es): There were no encounter diagnoses. has a past medical history of CAD (coronary artery disease), Cardiac arrest ([facility] Osteolysis, PEA (Pulseless electrical activity) ([facility] and PVD (peripheral vascular disease) ([facility] has a past surgical history that includes Cardiac catheterization (2019); Femur Surgery; Tonsillectomy; Coronary artery bypass graft (2019); Hardware Removal (Right, 2019); Tibia fracture surgery (Right); Coronary artery bypass graft (N/A, 2019); RECONSTRUCTIVE REPAIR STERNAL (N/A, 2019); Hardware Removal (Right, 2019); and REMOVE HARDWARE FEMUR (N/A, 2019). Restrictions  Restrictions/Precautions  Restrictions/Precautions: Cardiac, General Precautions, Surgical Protocols, Fall Risk(telesitter)  Required Braces or Orthoses?: Yes(R AFO, R procare boot)  Required Braces or Orthoses  Other: Heart Hugger Brace  Right Lower Extremity Brace:  Ankle Foot Orthotics  RLE Brace Type: Profo boot and AFO  Left Lower Extremity Brace: Boot  LLE Brace Type: Profo boot  Position Activity Restriction  Sternal Precautions: No Pushing, No Pulling, 5# Lifting Restrictions  Sternal Precautions: Emergent CABG X4  Other position/activity restrictions: Fall Risk  Subjective              Orientation     Cognition      Objective   Bed mobility  Bridging: Stand by assistance  Rolling to Left: Stand by assistance  Rolling to Right: Stand by assistance  Supine to Sit: Stand by assistance  Sit to Supine: Stand by assistance  Scooting: Stand by assistance     Ambulation  Ambulation?: Yes  Ambulation 1  Surface: level tile  Device: Rolling Walker  Other Apparatus: Right;Dorsiflex Assist  Assistance: Contact guard assistance  Quality of Gait: decreased

## 2019-04-29 NOTE — CARE COORDINATION
Pt completed the burns depression scale and scored a 8 which places the pt in the borderline depression category. There were no reports of suicidal/homicidal ideation or plans at the time of the assessment. there is no  history suicidal/homicidal attempts or thought. There is no history of mental health treatment. There is a history of  alcohol abuse 19 years sober.  There is a history of  alcohol treatment in the past.

## 2019-04-29 NOTE — PLAN OF CARE
Problem: Risk for Impaired Skin Integrity  Goal: Tissue integrity - skin and mucous membranes  Description  Structural intactness and normal physiological function of skin and  mucous membranes. Outcome: Ongoing  Note:   No new skin issues. Peg site pink with  serosang drainage. Some brownish drainage at  trach site. Abdominal incision  with pinkness along incision with peeling skin     Problem: Falls - Risk of:  Goal: Will remain free from falls  Description  Will remain free from falls  Outcome: Ongoing  Note:   No falls up with steady gait  Goal: Absence of physical injury  Description  Absence of physical injury  Outcome: Ongoing     Problem: Risk for Impaired Skin Integrity  Goal: Tissue integrity - skin and mucous membranes  Description  Structural intactness and normal physiological function of skin and  mucous membranes. Outcome: Ongoing  Note:   No new skin issues. Peg site pink with  serosang drainage. Some brownish drainage at  trach site. Abdominal incision  with pinkness along incision with peeling skin     Problem: Falls - Risk of:  Goal: Will remain free from falls  Description  Will remain free from falls  Outcome: Ongoing  Note:   No falls up with steady gait  Goal: Absence of physical injury  Description  Absence of physical injury  Outcome: Ongoing     Problem: Risk for Impaired Skin Integrity  Goal: Tissue integrity - skin and mucous membranes  Description  Structural intactness and normal physiological function of skin and  mucous membranes. Outcome: Ongoing  Note:   No new skin issues. Peg site pink with  serosang drainage. Some brownish drainage at  trach site.  Abdominal incision  with pinkness along incision with peeling skin     Problem: Falls - Risk of:  Goal: Will remain free from falls  Description  Will remain free from falls  Outcome: Ongoing  Note:   No falls up with steady gait  Goal: Absence of physical injury  Description  Absence of physical injury  Outcome: Ongoing

## 2019-04-29 NOTE — PROGRESS NOTES
Called and spoke with [facility] at Dr Colette Lee office about follow up appt.  She stated she will talk with Dr Navdeep Atkins and call us back on 4/30

## 2019-04-29 NOTE — PROGRESS NOTES
Physical Medicine & Rehabilitation  Progress Note    4/29/2019 10:33 AM     CC: Ambulatory and ADL dysfunction due to cardiac arrest, CABG, CVA, infected right lower extremity hardware, metabolic encephalopathy and respiratory failure    Subjective:   No complaints. Slept better last night. Bowels and bladder okay. Pain controlled. Cont with telesitter    ROS:  Denies fevers, chills, sweats. No chest pain, palpitations, lightheadedness. Denies coughing, wheezing or shortness of breath. Denies abdominal pain, nausea, diarrhea or constipation. No new areas of joint pain. Denies new areas of numbness or weakness. Denies new anxiety or depression issues. No new skin problems. Rehabilitation:   PT:  Restrictions/Precautions: Cardiac, General Precautions, Surgical Protocols, Fall Risk(telesitter)  Sternal Precautions: Emergent CABG X4  Other position/activity restrictions: Fall Risk  Required Braces or Orthoses  Other: Heart Hugger Brace  Right Lower Extremity Brace: Ankle Foot Orthotics  RLE Brace Type: Profo boot and AFO  Left Lower Extremity Brace: Boot  LLE Brace Type: Profo boot   Transfers  Sit to Stand: Minimal Assistance  Stand to sit: Minimal Assistance, Contact guard assistance  Bed to Chair: Minimal assistance, Moderate assistance  Stand Pivot Transfers: Contact guard assistance(w/RW)  Squat Pivot Transfers: Contact guard assistance(without AD w/c<> NU-step)  Comment: Pt used RW. Pt needs verbal cueing for hand placement during transfers with little carry over. At times pt lose balance posteriorly  Ambulation 1  Surface: level tile  Device: Rolling Walker  Other Apparatus: Right(PRAFO boot)  Assistance: Contact guard assistance  Quality of Gait: decreased ton, decreased step length, R foot drop, decreased endurance  Distance: 100ft  Comments: Pt reported feeling light headed toward end of walk.  SPO2 96%, HR 88bpm BP 90/55    Transfers  Sit to Stand: Minimal Assistance  Stand to sit: Minimal Assistance, Contact guard assistance  Bed to Chair: Minimal assistance, Moderate assistance  Stand Pivot Transfers: Contact guard assistance(w/RW)  Squat Pivot Transfers: Contact guard assistance(without AD w/c<> NU-step)  Comment: Pt used RW. Pt needs verbal cueing for hand placement during transfers with little carry over. At times pt lose balance posteriorly  Ambulation  Ambulation?: Yes  More Ambulation?: Yes  Ambulation 1  Surface: level tile  Device: Rolling Walker  Other Apparatus: Right(PRAFO boot)  Assistance: Contact guard assistance  Quality of Gait: decreased ton, decreased step length, R foot drop, decreased endurance  Distance: 100ft  Comments: Pt reported feeling light headed toward end of walk. SPO2 96%, HR 88bpm BP 90/55    Surface: level tile  Ambulation 1  Surface: level tile  Device: Rolling Walker  Other Apparatus: Right(PRAFO boot)  Assistance: Contact guard assistance  Quality of Gait: decreased ton, decreased step length, R foot drop, decreased endurance  Distance: 100ft  Comments: Pt reported feeling light headed toward end of walk. SPO2 96%, HR 88bpm BP 90/55        OT:  OT FIM:   Groomin - Requires setup/cues to do all tasks  Bathin - Able to bathe 8-9 areas  Dressing-Upper: 5 - Requires setup/supervision/cues and/or requires assist with presthesis/brace only  Dressing-Lower: 3 - Requires assist with 2-3 parts of dressing(wears AFO RLE)  Toiletin - Did not occur  Shower Transfer: 0 - Activity does not occur(pt declined shower washed sinkside )      ST:    Pending          Objective:  BP (!) 103/57   Pulse 86   Temp 97.8 °F (36.6 °C)   Resp 18   Ht 5' 8.11\" (1.73 m)   Wt 138 lb 12.8 oz (63 kg)   SpO2 98%   BMI 21.04 kg/m²  I Body mass index is 21.04 kg/m².  I   Wt Readings from Last 1 Encounters:   19 138 lb 12.8 oz (63 kg)      Temp (24hrs), Av.9 °F (36.6 °C), Min:97.8 °F (36.6 °C), Max:98 °F (36.7 °C)         GEN: well developed, well nourished, no acute distress  HEENT: Normocephalic atraumatic, EOMI, mucous membranes pink and moist  CV: RRR, no murmurs, rubs or gallops  PULM: CTAB, no rales or rhonchi. Respirations WNL and unlabored  ABD: soft, NT, ND, +BS and equal  NEURO: A&O x3. Sensation intact to light touch. MSK: 4/5 upper extremities, 4/5 proximal lower extremities, left lower extremity dorsiflexion 0/5, plantarflexion 3/5, right lower extremity plantar and dorsiflexion 0/5  EXTREMITIES: No calf tenderness to palpation bilaterally. No edema BLEs  SKIN: warm dry and intact with good turgor, sternal incision clean, lower extremity graft incision clean and intact, left heel blackened, right medial heel blackened though heels are intact, is sacrum stage II ulcer clean, Duoderm on place, right  Ant calf incision clean though 1 x 1 cm wound ? depth, appears clean-no changes and no signs or symptoms of infection  PSYCH: appropriately interactive. Affect WNL.   Good spirits        Medications   Scheduled Meds:   traZODone  50 mg Oral Nightly    QUEtiapine  25 mg Oral Nightly    enoxaparin  40 mg Subcutaneous Daily    atorvastatin  40 mg Oral Nightly    clopidogrel  75 mg Oral Daily    famotidine  20 mg Oral BID    therapeutic multivitamin-minerals  1 tablet Oral Daily with breakfast    aspirin  81 mg Oral Daily    docusate  100 mg Oral Daily    doxycycline monohydrate  100 mg Oral 2 times per day    furosemide  40 mg Oral BID    insulin glargine  30 Units Subcutaneous Daily    insulin lispro  0-12 Units Subcutaneous TID WC    insulin lispro  0-6 Units Subcutaneous Nightly    lisinopril  5 mg Oral Daily    metoprolol tartrate  50 mg Oral BID    potassium chloride  40 mEq Oral BID WC    spironolactone  25 mg Oral Daily    docusate sodium  100 mg Oral BID    polyethylene glycol  17 g Oral Daily     Continuous Infusions:   dextrose       PRN Meds:.oxyCODONE **OR** [DISCONTINUED] oxyCODONE, dextrose, dextrose, glucagon (rDNA), glucose, magnesium hydroxide, albuterol, bisacodyl     Diagnostics:     CBC:   Recent Labs     04/29/19  0627   WBC 12.1*   RBC 3.67*   HGB 11.2*   HCT 33.7*   MCV 91.9   RDW 14.8   *     BMP:   Recent Labs     04/28/19  0737 04/29/19  0627    130*   K 4.7 4.8   CL 96* 92*   CO2 29 26   BUN 16 20   CREATININE 0.51* 0.55*     BNP: No results for input(s): BNP in the last 72 hours. PT/INR:   No results for input(s): PROTIME, INR in the last 72 hours. APTT: No results for input(s): APTT in the last 72 hours. CARDIAC ENZYMES: No results for input(s): CKMB, CKMBINDEX, TROPONINT in the last 72 hours. Invalid input(s): CKTOTAL;3  FASTING LIPID PANEL:  Lab Results   Component Value Date    CHOL 56 04/17/2019    HDL 13 (L) 04/17/2019    TRIG 107 04/17/2019     LIVER PROFILE:   Recent Labs     04/26/19  1451   AST 53*   *   BILIDIR 0.21   BILITOT 0.43   ALKPHOS 169*        I/O (24Hr): Intake/Output Summary (Last 24 hours) at 4/29/2019 1033  Last data filed at 4/29/2019 0842  Gross per 24 hour   Intake --   Output 1350 ml   Net -1350 ml       Glu last 24 hour  Recent Labs     04/28/19  1126 04/28/19  1619 04/28/19  2058 04/29/19  0649   POCGLU 186* 188* 272* 227*       No results for input(s): CLARITYU, COLORU, PHUR, SPECGRAV, PROTEINU, RBCUA, BLOODU, BACTERIA, NITRU, WBCUA, LEUKOCYTESUR, YEAST, GLUCOSEU, BILIRUBINUR in the last 72 hours. Other Diagnoses/plan:    1. Ambulatory and ADL dysfunction secondary to cardiac arrest, CABG, CVA, infected right lower extremity hardware, metabolic encephalopathy and respiratory failure-continue rehab efforts , team conference tomorrow  2. Status PTA arrest, emergent cardiac cath, coronary artery disease, myopathy, CABG ×4 on 4/7-Lasix, lisinopril, metoprolol, spironolactone  3. Status post respiratory arrest, vent, oxygen, extubated 4/19/19-Proventil,  O2 as needed  4.  RLE hardware removal /22/19 , chronic leg osteomyelitis, right foot drop-ankle foot orthoses, weightbearing as tolerated, doxycycline  60 days, leukocytosis trending down, continue dressing and packing-wound care follow-no results erythema or drainage  5. Low blood pressure/-cardiology follow-up,  6. miild hyponatremia-recheck, diuretics on hold by cardiology-await follow-up for medication adjustments  7. Thrombocytosis-monitor, improving  8. Anemia-improving-monitor improving  9. Diabetes-insulin, sliding scale and glucose as above  10. multiple infarct/?right ICA stenting-aspirin and Plavix, Lipitor follow up with Dr. Belkys Agrawal 5/21-decision on stenting at that time  11. Encephalopathy-slept better with trazodone, Kaur Folds will decrease yesterday, will DC in few days if doing well  12. GI-Ischemic hepatitis more also on Pepcid on Colace, MiraLAX , recheck LFTs-continue to improve  13. S/p  acute kidney injury improved  14. Pain-off Percocet and Tylenol, changed to oxycodone due to elevated liver enzymes  15. Multiple skin wounds, blackened healed, sacral stage II ulcer-wound care, PRAFO b/l, elevated heels decrease pressure to sacrum-wound care follow  16. DVTprophylaxis-Lovenox          Gerald Elliott. Melissa Valdez MD       This note is created with the assistance of a speech recognition program.  While intending to generate a document that actually reflects the content of the visit, the document can still have some errors including those of syntax and sound a like substitutions which may escape proof reading.   In such instances, actual meaning can be extrapolated by contextual diversion

## 2019-04-29 NOTE — PROGRESS NOTES
Called and spoke with Dr Kenya Salvador cardiology about patient's diuretics being stopped for now due to low bp and bp meds changed and Lisinopril dc'd. Asked about potassium order and he stated to hold it until diuretics restarted.  He stated he will see patient tomorrow

## 2019-04-29 NOTE — PROGRESS NOTES
[facility]: [facility]   ACUTE REHABILITATION OCCUPATIONAL THERAPY  DAILY NOTE    Date: 19  Patient Name: Karlie Meza      Room: 1253/4869-93    MRN: [id number]   : 1965  (46 y.o.)  Gender: male   Referring Practitioner: Jovi Rosenberg MD  Diagnosis: Acute CVA, cardiac arrest, CABG  Additional Pertinent Hx: Pt admitted to [address] [place] [address] ED 19 with cardiac arrest and increased SOB. Pt underwent emergent CABG x4 19 with delayed closure on 19. Pt was intubated from 19 to 19. Pt also underwent screw removal from ORIF of R knee on 19 and I&D on the R knee 19. Pt admitted to [facility] for rehabiliation on 19    Restrictions  Restrictions/Precautions: Cardiac, General Precautions, Surgical Protocols, Fall Risk(telesitter)  Sternal Precautions: Emergent CABG X4  Other position/activity restrictions: Fall Risk  Required Braces or Orthoses  Other: Heart Hugger Brace  Right Lower Extremity Brace: Ankle Foot Orthotics  RLE Brace Type: Profo boot and AFO  Left Lower Extremity Brace: Boot  LLE Brace Type: Profo boot  Required Braces or Orthoses?: Yes(R AFO, R procare boot)    Subjective  Comments: Pt supine in bed upon OT arrival in PM, per RN, patients clothing was soiled and he had been changed and was resting in bed. RN reports low BP issues however ok's light OT session.    Patient Currently in Pain: Denies                Objective  Cognition  Safety Judgement: Good awareness of safety precautions  Balance  Standing Balance: Contact guard assistance  Bed mobility  Supine to Sit: Stand by assistance  Sit to Supine: Stand by assistance  Comment: VC for proper technique for bed mobility required  Transfers  Stand Step Transfers: Contact guard assistance(recliner chair to w/c)  Standing Balance  Time: 1-2 minutes total   Activity: LB dressing /bathing at sink  Comment: minor unsteadiness, fatigues quickly, per RN pt's BP has been low              Additional Activities: AM: Patient was provided education on AE including sock aid and reacher for better ease of LB dressing. Patient provided visual and verbal directions and was able to use sock aid appropirately with cues. Patient agreeable to use AE tomorrow during ADLs. PM: Pt sat unsupported at EOB while participating in card game activity. Patient desired to play a certain type of [employer] game. Patient able to shuffle cards with 1 instance of dropping some of the cards. Patient delt cards, explained rules to game, and had no difficulty with rest of game                 ADL  Additional Comments: see FIMs           OT FIM:   Eatin - Feeds self with setup/supervision/cues and/or requires only setup/supervision/cues to perform tube feedings  Groomin - Requires setup/cues to do all tasks(seated at sink for all grooming tasks for [facility])  Bathin - Able to bathe 8-9 areas(requested assist with washing buttocks this date; set up for UBB)  Dressing-Upper: 5 - Requires setup/supervision/cues and/or requires assist with presthesis/brace only  Dressing-Lower: 5 - Requires setup/supervision/cues and/or staff applies TEDS/prosthesis/brace only(uses cross leg techinque, cues for frequent rest breaks after each activity due to increased respirations. AE practice during last half of AM session, V/D understanding willing to use during tomorrow ADL tx)  Toiletin - Requires setup/supervision/cues(use of urinal at bedside)  Toilet Transfer: (declined, used urinal bedside)  Primary Mode: Shower  Shower Transfer: (declined, washed up at sink)               Assessment  Performance deficits / Impairments: Decreased functional mobility ; Decreased cognition;Decreased high-level IADLs;Decreased endurance;Decreased ADL status; Decreased balance;Decreased safe awareness;Decreased strength  Discharge Recommendations: Continue to assess pending progress;Home with assist PRN;Home with Home health OT  Activity Tolerance: Treatment limited secondary to medical complications (free text); Patient Tolerated treatment well  Activity Tolerance: AM tolerated well, PM toleration limited by low BP   Type of devices: All fall risk precautions in place;Call light within reach; Left in bed; Chair alarm in place; Bed alarm in place; Left in chair;Gait belt;Patient at risk for falls          Patient Education:  Patient Goals   Patient goals : to get better  Patient Education: OT POC/goals, safety with transfers, ADL participation, ARU process  Learner:patient  Method: explanation       Outcome: needs reinforcement     Plan  Plan  Times per week: 5-7x/wk 1.5 hr/day   Current Treatment Recommendations: Balance Training, Functional Mobility Training, Endurance Training, Equipment Evaluation, Education, & procurement, Home Management Training, Patient/Caregiver Education & Training, Self-Care / ADL, Safety Education & Training, Pain Management, Strengthening  Patient Goals   Patient goals : to get better  Short term goals  Time Frame for Short term goals: in 7 days, pt will  Short term goal 1: perform functional transfers with CGA and LRD  Short term goal 2: perform LB ADL tasks with Min A   Short term goal 3: V/D Good understanding of AE and/or modified/compensatory techniques for increased safety and independence with self care and mobility tasks  Short term goal 4: V/D Good understanding of sternal precautions as they relate to self care and mobility tasks  Short term goal 5: increase standing tolerance to 5-7 minutes with 0-1 UE support during functional task  Short term goal 6: actively participate in 30 min therapeutic exercise/activity to promote increased safety and independence with self care and mobility tasks   Long term goals  Time Frame for Long term goals : by discharge, pt will  Long term goal 1: perform functional transfer and functional mobility with Mod I and LRD  Long term goal 2: perform BADL tasks with Mod I   Long term goal 3: demonstrate Good safety throughout all

## 2019-04-29 NOTE — PROGRESS NOTES
[facility] Internal Medicine    Progress Note    4/29/2019    4:23 PM    Name:   Jonny Mullen  MRN:     [id number]     Acct:      [de-identified]   Room:   [address] [place] [address] Day:  4  Admit Date:  4/25/2019  7:49 PM    PCP:   No primary care provider on file. Code Status:  Full Code    Subjective:     C/C: No chief complaint on file. Interval History Status: improved. HPI:   Patient had a cardiac arrest, found to have multivessel coronary artery disease, had CABG, patient had Stroke with Speech Difficulties   Infected Hard ware was Removed From Right leg     Review of Systems:     Constitutional:  negative for chills, fevers, sweats  Respiratory:  negative for cough, dyspnea on exertion, hemoptysis, shortness of breath, wheezing  Cardiovascular:  negative for chest pain, chest pressure/discomfort, lower extremity edema, palpitations  Gastrointestinal:  negative for abdominal pain, constipation, diarrhea, nausea, vomiting  Neurological:  Speech difficulties, foot drop present in right leg    Medications:      Allergies:  No Known Allergies    Current Meds:   Scheduled Meds:    metoprolol tartrate  25 mg Oral BID    [START ON 4/30/2019] insulin glargine  35 Units Subcutaneous Daily    traZODone  50 mg Oral Nightly    QUEtiapine  25 mg Oral Nightly    enoxaparin  40 mg Subcutaneous Daily    atorvastatin  40 mg Oral Nightly    clopidogrel  75 mg Oral Daily    famotidine  20 mg Oral BID    therapeutic multivitamin-minerals  1 tablet Oral Daily with breakfast    aspirin  81 mg Oral Daily    docusate  100 mg Oral Daily    doxycycline monohydrate  100 mg Oral 2 times per day    insulin lispro  0-12 Units Subcutaneous TID WC    insulin lispro  0-6 Units Subcutaneous Nightly    potassium chloride  40 mEq Oral BID WC    docusate sodium  100 mg Oral BID    polyethylene glycol  17 g Oral Daily     Continuous Infusions:    dextrose       PRN Meds: oxyCODONE **OR** [DISCONTINUED] oxyCODONE, dextrose, dextrose, glucagon (rDNA), glucose, magnesium hydroxide, albuterol, bisacodyl    Data:     Past Medical History:   has a past medical history of CAD (coronary artery disease), Cardiac arrest ([facility] Osteolysis, PE[facility] (Pulseless electrical activity) [facility] and PVD (peripheral vascular disease) ([facility] Social History:   reports that he has quit smoking. He does not have any smokeless tobacco history on file. Family History: No family history on file. Vitals:  /76   Pulse 100   Temp 97.8 °F (36.6 °C)   Resp 18   Ht 5' 8.11\" (1.73 m)   Wt 138 lb 12.8 oz (63 kg)   SpO2 97%   BMI 21.04 kg/m²   Temp (24hrs), Av.9 °F (36.6 °C), Min:97.8 °F (36.6 °C), Max:98 °F (36.7 °C)    Recent Labs     198 19  0649 19  1037 19  1601   POCGLU 272* 227* 374* 267*       I/O (24Hr): Intake/Output Summary (Last 24 hours) at 2019 1623  Last data filed at 2019 1509  Gross per 24 hour   Intake --   Output 1550 ml   Net -1550 ml       Labs:    [unfilled]    Lab Results   Component Value Date/Time    SPECIAL NOT REPORTED 2019 04:08 PM     Lab Results   Component Value Date/Time    CULTURE (A) 2019 04:08 PM     STAPHYLOCOCCUS SPECIES, COAGULASE NEGATIVE SCANT GROWTH Susceptibilities have not been performed. Notify the laboratory within 7 days for further workup if clinically indicated.     CULTURE NO ANAEROBIC ORGANISMS ISOLATED AT 5 DAYS (A) 2019 04:08 PM       [unfilled]    Radiology:      Physical Examination:        General appearance:  alert, cooperative and no distress  Mental Status:  oriented to person, place and time and normal affect  Lungs:  clear to auscultation bilaterally, normal effort  Heart:  regular rate and rhythm, no murmur  Abdomen:  soft, nontender, nondistended, normal bowel sounds, no masses, hepatomegaly, splenomegaly  Extremities: Dressing present and right leg , Splint present   Skin:  no gross lesions, rashes,

## 2019-04-29 NOTE — PATIENT CARE CONFERENCE
[address] [facility]   ACUTE REHABILITATION  TEAM CONFERENCE NOTE  Date: 19  Patient Name: Ralph Duncan       Room: 7956/0519-49  MRN: [id number]       : 1965  (46 y.o.)     Gender: male   Referring Practitioner: Dr. Henry Grier  Diagnosis: Cardiac Arrest and stroke    NURSING  FIMS:  Bladder: 5 - Voids, but staff empties urinal/BSC and/or requires setup/sup/cues to urinate (e.g. timed voids/self cath) (no accidents, no touching) (help w/I&O cath)  Bladder Level of Assistance: 5- Requires helper to provide or empty urinal, bedside commode, or requires set-up/cues to empty bladder (ie. helper provides self-catheter supplies )  Bladder Frequency of Accidents: 6 - No accidents,uses device like urinal, bedpan, absorbant pad, or requires medication to manage bladder  Bowel: 6 - Uses toilet independently with device or oral medication(s)  Bowel Level of Assistance: 6- Requires device like bedpan, diaper, bedside commode, but patient obtains and empties own device including colostomy.   Or requires bowel management medication including stool softeners, laxatives, inserts own suppository  Bowel Frequency of Accidents: 6 - No accidents, uses device like bedpan, diaper, bedside commode colostomy, or requires medication to manage bowels   Bladder  Continent and incontinent  Bowel   Continent  Intervention    Both Bowel & Bladder Program     Wounds/Incisions/Ulcers: Wounds present on - Sacrum/R Heel/R tibia(Incision)Chest and left leg incisions intact leg incisions less red  Medication Education Program: Patient able to manage medications and being educated by nursing  Pain: Patient's pain is currently controlled with -  Roxicodone 5mg every four hours ass needed    Fall Risk:  Falling star program initiated    PHYSICAL THERAPY    Bed Mobility  Bridging: Stand by assistance  Rolling: Stand by assistance;Rolling Left;Rolling Right  Supine to Sit: Stand by assistance  Sit to Supine: Stand by assistance  Scooting: Stand by assistance    Transfers:  Sit to Stand: Contact guard assistance  Stand to sit: Contact guard assistance  Bed to Chair: Stand by assistance  Stand Pivot Transfers: Contact guard assistance    Ambulation 1  Surface: level tile  Device: Rolling Walker  Other Apparatus: Right;Dorsiflex Assist  Assistance: Contact guard assistance  Quality of Gait: decreased ton, decreased step length, R foot drop, decreased endurance  Distance: 100ft  Comments: Pt reported feeling light headed toward end of walk. SPO2 96%, HR 88bpm BP 90/55  Ambulation 2  Surface - 2: level tile  Device 2: Rolling Walker  Other Apparatus 2: Right;Dorsiflex Assist  Assistance 2: Contact guard assistance  Quality of Gait 2: tends to slide (L) LE vs picking it up with advancing, vc's to correct. No LOB  Comments: No AFO used at this time due to skin breakdown on (R) LE   Ambulation 3  Surface - 3: level tile  Device 3: No device  Assistance 3: Moderate assistance  Distance: 5 feet    Stairs  # Steps : 5  Stairs Height: 4\"(and 6\")  Rails: Bilateral  Device: No Device  Other Apparatus: (PRAFO boot (R)LE)  Assistance: Contact guard assistance  Comment: Pt able to verbalize and demonstrate correct step -to sequencing ascending/decending steps today. FIMS:  Bed, Chair, Wheel Chair: 4 - Requires steadying assistance only <25% assist  and/or requires assist with one leg only  Walk: 1 - Total Assistance Walks < 50 feet OR requires two or more people OR patient performs < 25% of locomotion effort  Distance Walked: 100 feet  Wheel Chair: 0 - Activity Not Assessed/Does Not Occur  Distance Traveled in Wheel Chair: 0  Stairs: 2- Maximal Assistance Performs 25-49% of the effort to go up and down 4 to 6 stairs and requires the assistance of one person only    PT Equipment Recommendations  Equipment Needed: (TBD)  Other: TBD    Assessment: Pt admitted to [address] after stay at [place] [address] for cardiac arrest. Pt with sternal precautions and FWB.  Pt able to perform bed mobility SBA except sup to sit min A due to surgical pain. Pt completed transfers and gait min A x1 with use of RW. sao2 ranged from 92-94% during activity and amb on room air. verbal cues were needed for hand placement during transfers for pt. Pt with more carryover for hand placement P.M session compared to A.M session. Pt tolerated P.M therapy well.      Goals  Short term goals  Time Frame for Short term goals: 1 week  Short term goal 1: Pt to perform bed mobility mod I  Short term goal 2: Pt to perform transfers CGA with use of RW  Short term goal 3: Pt to amb  ft with RW and CGA with no rest break  Short term goal 4: Pt to ascend/descend 5 steps with bilateral handrail and min A x1    OCCUPATIONAL THERAPY  FIMS:  Eatin - Feeds self with adaptive equipment/dentures and/or feeds self with modified diet and/or performs own tube feeding(Pt reports no A c set up req)  Groomin - Requires setup/cues to do all tasks  Bathin - Able to bathe all 10 areas with setup/sup/cues(set up, VC's, SBA)  Dressing-Upper: 4 - Requires assist with buttons/zippers only and/or requires assist with one arm only(set up, A c adjusting heart hugger)  Dressing-Lower: 5 - Requires setup/supervision/cues and/or staff applies TEDS/prosthesis/brace only  Toiletin - Did not occur  Toilet Transfer: 0 - Did not occur  Primary Mode: Shower  Tub Transfer: 0 - Activity does not occur  Shower Transfer: 0 - Activity does not occur          Short term goals  Time Frame for Short term goals: in 7 days, pt will  Short term goal 1: perform functional transfers with CGA and LRD  Short term goal 2: perform LB ADL tasks with Min A   Short term goal 3: V/D Good understanding of AE and/or modified/compensatory techniques for increased safety and independence with self care and mobility tasks  Short term goal 4: V/D Good understanding of sternal precautions as they relate to self care and mobility tasks  Short term goal 5: increase standing tolerance to 5-7 minutes with 0-1 UE support during functional task  Short term goal 6: actively participate in 30 min therapeutic exercise/activity to promote increased safety and independence with self care and mobility tasks     SPEECH THERAPY  Mod A problem solving, mod A memory  Short Term Goal:  Min A problem solving, min A memory    RECREATIONAL THERAPY  Comment/Participation: Participating in unit program.  Hockey fan-following playoffs, has variety of reading materials as well as sudoku. NUTRITION  Weight: 138 lb 12.8 oz (63 kg) / Body mass index is 21.04 kg/m². Diet Rx: Carbohydrate Control. Pt appeared moderately malnourished on admission but PO intake currently appears adequate. Oral nutrition supplements changed to a lower carbohydrate type 4-29. Ref. Range 4/29/2019 06:49 4/29/2019 10:37 4/29/2019 16:01   POC Glucose Latest Ref Range: 75 - 110 mg/dL 227 (H) 374 (H) 267 (H)   Please see nutrition note for details. SOCIAL WORK ASSESSMENT  Assessment: shelter house   Pre-Admission Status:  Lives With: Other (comment)(recover addicts)  Type of Home: House(Pt is a resident at a substance abuse living facility)  Home Layout: Multi-level, Laundry in basement  Home Access: Stairs to enter with rails  Entrance Stairs - Number of Steps: 22  Entrance Stairs - Rails: Right  Bathroom Shower/Tub: Walk-in shower  Bathroom Toilet: Handicap height  Bathroom Equipment: (unsure)  Bathroom Accessibility: Accessible  Home Equipment: (none)  ADL Assistance: Independent  Homemaking Assistance: Independent(has a cook at the house)  [employer] Responsibilities: Yes  Ambulation Assistance: Independent  Transfer Assistance: Independent  Active : Yes  Mode of Transportation: Truck  Occupation: Full time employment, Part time employment  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  Pt does not use any AD or DME. Family Education: No family available for education    Risk for Readmission: High 14>   Readmission Risk              Risk of Unplanned Readmission:        17         Critical Items: None       Problem / Barrier Intervention / Plan  Results   Impaired functional mobility Functional mobility training for safe transfers, gait and stairs. Stairs- Multiple level home Stair training. B Foot drop, R worse than Left. Impaired cognition Cognitive retraining exercises    Multiple open wounds Wound care, PRAFO Boot for heel protection/foot positioining.     Altered Tolerance to ADL Tasks  Training in strategies to increase tolerance to tasks and safety during care            Functional FIM Gain  Admission Score:  53  Progress:  TBD  Goal:  107   `  Discharge Plan   Estimated Discharge Date: 5/8/19  Overnight or Day Pass: No  Factors facilitating achievement of predicted outcomes: Cooperative  Barriers to the achievement of predicted outcomes: Pain, Cognitive deficit, Depression, Decreased endurance, Lower extremity weakness, Medical complications, Skin Care and Medication managment    Functional Goals at discharge:  Home alone Close supervision and cg for amb  Discharge therapy goals:  PT: Long term goals  Time Frame for Long term goals : by discharge  Long term goal 1: Pt to perform bed mobility independent  Long term goal 2: pt to perform transfers with least restrictive DME and supervision  Long term goal 3: pt to amb with least restrictive -250 ft supervision  Long term goal 4: Pt to ascend/descend a flight of stairs with R handrail supervision  Long term goal 5: Pt to demonstrate understanding of HEP  OT:Long term goals  Time Frame for Long term goals : by discharge, pt will  Long term goal 1: perform functional transfer and functional mobility with Mod I and LRD  Long term goal 2: perform BADL tasks with Mod I   Long term goal 3: demonstrate Good safety throughout all ADL/mobility

## 2019-04-29 NOTE — PLAN OF CARE
Problem: Risk for Impaired Skin Integrity  Goal: Tissue integrity - skin and mucous membranes  Description  Structural intactness and normal physiological function of skin and  mucous membranes. Outcome: Ongoing   Patient wearing Prafo boots HS to protect heels from further injury. Patient educated on importance of wearing devices. Continues to refuse. No new skin issues this shift. Skin integrity maintained. Safety maintained this shift. Will continue to monitor. Problem: Falls - Risk of:  Goal: Will remain free from falls  Description  Will remain free from falls  Outcome: Ongoing  Patient remains free from falls/injuries at this time. Call light within reach. Safety maintained this shift. Will continue to monitor. Problem: Pain:  Goal: Pain level will decrease  Description  Pain level will decrease  Outcome: Ongoing  Patient denies pain so far this shift. Safety maintained this shift. Will continue to monitor.

## 2019-04-29 NOTE — CONSULTS
[facility]   Consult Note                 Date:   4/29/2019  Date of admission:  4/25/2019  7:49 PM  MRN:   [id number]  YOB: 1965    Reason for Consult:  Hypotension     HISTORY OF PRESENT ILLNESS:    The patient is a 46 y.o.  male who is admitted to the hospital for in patient rehab . Pt was seen in [address] [place] [address] Dr. GUTIERREZ where presented late ant STEMI , multiple arrest s/p emergent cath , emergent CABG , CHF   Pt on multiple meds  This am BP low and cardiology called . Now pt sitting in chair denies any dizziness , CO , SOB . BP up 103/60 by holding this am meds     Past Medical History:   has a past medical history of CAD (coronary artery disease), Cardiac arrest ([facility] [address] Osteolysis, PEA (Pulseless electrical activity) ([facility] [address] and PVD (peripheral vascular disease) ([facility] [address] Past Surgical History:   has a past surgical history that includes Cardiac catheterization (04/07/2019); Femur Surgery; Tonsillectomy; Coronary artery bypass graft (04/07/2019); Hardware Removal (Right, 04/07/2019); Tibia fracture surgery (Right); Coronary artery bypass graft (N/A, 4/7/2019); RECONSTRUCTIVE REPAIR STERNAL (N/A, 4/8/2019); Hardware Removal (Right, 04/22/2019); and REMOVE HARDWARE FEMUR (N/A, 4/22/2019).      Home Medications:    Prior to Admission medications    Not on File       Current Medications: Scheduled Meds:   traZODone  50 mg Oral Nightly    QUEtiapine  25 mg Oral Nightly    enoxaparin  40 mg Subcutaneous Daily    atorvastatin  40 mg Oral Nightly    clopidogrel  75 mg Oral Daily    famotidine  20 mg Oral BID    therapeutic multivitamin-minerals  1 tablet Oral Daily with breakfast    aspirin  81 mg Oral Daily    docusate  100 mg Oral Daily    doxycycline monohydrate  100 mg Oral 2 times per day    furosemide  40 mg Oral BID    insulin glargine  30 Units Subcutaneous Daily    insulin lispro  0-12 Units Subcutaneous TID WC    insulin lispro  0-6 Units Subcutaneous Nightly    lisinopril 5 mg Oral Daily    metoprolol tartrate  50 mg Oral BID    potassium chloride  40 mEq Oral BID WC    spironolactone  25 mg Oral Daily    docusate sodium  100 mg Oral BID    polyethylene glycol  17 g Oral Daily     Continuous Infusions:   dextrose       PRN Meds:.oxyCODONE **OR** [DISCONTINUED] oxyCODONE, dextrose, dextrose, glucagon (rDNA), glucose, magnesium hydroxide, albuterol, bisacodyl     Allergies:  Patient has no known allergies. Social History:   reports that he has quit smoking. He does not have any smokeless tobacco history on file. Family History: family history is not on file. Yes for premature CAD. No for h/o sudden cardiac death    Review of Systems   CONSTITUTIONAL:  negative for fevers, chills, fatigue and malaise    EYES:  negative for discharge    HEENT:  negative for epistaxis and sore throat    RESPIRATORY:  negative for cough, shortness of breath, wheezing    CARDIOVASCULAR:  negative for chest pain, palpitations, syncope, edema    GASTROINTESTINAL:  negative for nausea, vomiting, diarrhea, constipation, abdominal pain    GENITOURINARY:  negative for incontinence    MUSCULOSKELETAL:  negative for neck or back pain    NEUROLOGICAL:  negative for headaches, seizures and double vision   PSYCHIATRIC:  negative               PHYSICAL EXAM:    Blood pressure (!) 103/57, pulse 86, temperature 97.8 °F (36.6 °C), resp. rate 18, height 5' 8.11\" (1.73 m), weight 138 lb 12.8 oz (63 kg), SpO2 98 %.     CONSTITUTIONAL: AOx4, no apparent distress, appears stated age   HEAD: normocephalic, atraumatic   EYES: PERRLA, EOMI   ENT: moist mucous membranes, uvula midline   NECK:  symmetric, no midline tenderness to palpation   LUNGS: clear to auscultation bilaterally   CARDIOVASCULAR: regular rate and rhythm, no murmurs, rubs or gallops   ABDOMEN: Soft, non-tender, non-distended with normal active bowel sounds   SKIN: no rash       DATA:    ECG:       Coronary Angiography: 4/7/19  LMCA: has distal 60% stenosis. LAD: has ostial 90% stenosis and long proximal 80% stenosis. D1 has proximal 70% stenosis. LCx: has 20% stenosis.   OM1 is very small.   OM2 has proximal 90% stenosis. RCA: has proximal 50% stenosis, mid 60% stenosis and distal 80% stenosis. Ramus: has ostial 99% stenosis.     The LV gram was performed in the DUTTON 30 position. LVEF: 15%. LV Wall Motion: severe hypokinesis of the anterior wall and mid inferior wall with apical akinesis.          Echo 4/12/19:  Left ventricle is normal in size. Global left ventricular systolic function is mildly reduced. Calculated ejection fraction is 40. Left ventricular wall thickness is mildly increased. Grade II diastolic dysfunction  Normal valvular function      1. Multivessel CAD s/p emergent CABG on 4/7 with LIMA-LAD, SVG-PDA,OM 1 & distal circumflex). Labs:     CBC:   Recent Labs     04/29/19  0627   WBC 12.1*   HGB 11.2*   HCT 33.7*   *     BMP:   Recent Labs     04/28/19  0737 04/29/19  0627    130*   K 4.7 4.8   CO2 29 26   BUN 16 20   CREATININE 0.51* 0.55*   LABGLOM >60 >60   GLUCOSE 209* 235*     BNP: No results for input(s): BNP in the last 72 hours. PT/INR: No results for input(s): PROTIME, INR in the last 72 hours. APTT:No results for input(s): APTT in the last 72 hours. CARDIAC ENZYMES:No results for input(s): CKTOTAL, CKMB, CKMBINDEX, TROPONINI in the last 72 hours.   FASTING LIPID PANEL:  Lab Results   Component Value Date    HDL 13 04/17/2019    TRIG 107 04/17/2019     LIVER PROFILE:  Recent Labs     04/26/19  1451   AST 53*   *   LABALBU 2.9*       Intake/Output Summary (Last 24 hours) at 4/29/2019 1017  Last data filed at 4/29/2019 6556  Gross per 24 hour   Intake --   Output 1350 ml   Net -1350 ml       IMPRESSION:    Patient Active Problem List   Diagnosis    PAD (peripheral artery disease) (HCC)    Subacute osteomyelitis of right tibia (HCC)    Metabolic encephalopathy    Abnormal CT of the head    Hepatitis    Acute cerebral infarction associated with systemic hypoxia or ischemia    Multi infarct state    Carotid stenosis, bilateral    Right foot drop    Thrombocytosis (HCC)    Acute CVA (cerebrovascular accident) ([facility] [address]    Moderate malnutrition ([facility] [address]           RECOMMENDATIONS:  HYPOTENSION , ON LOTS OF MEDS   DC SPIRANOLACTONE , HOLD LASIX NOW   DC LISINOPRIL . START LOPRESSOR 25 MG BID TONIGHT WITH PARAMETERS   CHECK BMP IN AM   CAD , CARDIAC ARREST S/P EMERGENT CATH EMERGENT CABG   CHF , NO SIGN OF FLUID OVER LOAD   CAN RESTART RHEHAB TOMORROW  ONCE BP IMPROVES   WILL FOLLOW         Discussed with patient and nursing.     Donna [facility]        [phone]

## 2019-04-29 NOTE — PROGRESS NOTES
Patient has an appt with Dr Lyudmila Joaquin on Friday may 3rd at 8:80  At [address]  Phone

## 2019-04-29 NOTE — PLAN OF CARE
Problem: Risk for Impaired Skin Integrity  Goal: Tissue integrity - skin and mucous membranes  Description  Structural intactness and normal physiological function of skin and  mucous membranes. 4/29/2019 1504 by Christie Guerra RN  Outcome: Ongoing  Note:   Wound care done as ordered to right lower leg, coccyx. Incisions on left leg appear less reddened  4/29/2019 1431 by Christie Guerra RN  Outcome: Ongoing  Note:   No new skin issues. Peg site pink with  serosang drainage. Some brownish drainage at  trach site. Abdominal incision  with pinkness along incision with peeling skin     Problem: Falls - Risk of:  Goal: Will remain free from falls  Description  Will remain free from falls  4/29/2019 1504 by Christie Guerra RN  Outcome: Ongoing  Note:   No falls. Up with contact guard assist  4/29/2019 1431 by Christie Guerra RN  Outcome: Ongoing  Note:   No falls up with steady gait  Goal: Absence of physical injury  Description  Absence of physical injury  4/29/2019 1504 by Christie Guerra RN  Outcome: Ongoing  4/29/2019 1431 by Christie Guerra RN  Outcome: Ongoing     Problem: Pain:  Description  Pain management should include both nonpharmacologic and pharmacologic interventions.   Goal: Pain level will decrease  Description  Pain level will decrease  4/29/2019 1504 by Christie Guerra RN  Outcome: Ongoing  Note:   Po med for pain with decrease  4/29/2019 1431 by Christie Guerra RN  Outcome: Ongoing  Goal: Control of acute pain  Description  Control of acute pain  4/29/2019 1504 by Christie Guerra RN  Outcome: Ongoing  4/29/2019 1431 by Christie Guerra RN  Outcome: Ongoing  Goal: Control of chronic pain  Description  Control of chronic pain  4/29/2019 1504 by Christie Guerra RN  Outcome: Ongoing  4/29/2019 1431 by Christie Guerra RN  Outcome: Ongoing

## 2019-04-30 LAB
ANION GAP SERPL CALCULATED.3IONS-SCNC: 12 MMOL/L (ref 9–17)
BUN BLDV-MCNC: 12 MG/DL (ref 6–20)
BUN/CREAT BLD: ABNORMAL (ref 9–20)
CALCIUM SERPL-MCNC: 8.7 MG/DL (ref 8.6–10.4)
CHLORIDE BLD-SCNC: 96 MMOL/L (ref 98–107)
CO2: 27 MMOL/L (ref 20–31)
CREAT SERPL-MCNC: 0.42 MG/DL (ref 0.7–1.2)
GFR AFRICAN AMERICAN: >60 ML/MIN
GFR NON-AFRICAN AMERICAN: >60 ML/MIN
GFR SERPL CREATININE-BSD FRML MDRD: ABNORMAL ML/MIN/{1.73_M2}
GFR SERPL CREATININE-BSD FRML MDRD: ABNORMAL ML/MIN/{1.73_M2}
GLUCOSE BLD-MCNC: 200 MG/DL (ref 75–110)
GLUCOSE BLD-MCNC: 202 MG/DL (ref 70–99)
GLUCOSE BLD-MCNC: 204 MG/DL (ref 75–110)
GLUCOSE BLD-MCNC: 262 MG/DL (ref 75–110)
GLUCOSE BLD-MCNC: 355 MG/DL (ref 75–110)
POTASSIUM SERPL-SCNC: 4.2 MMOL/L (ref 3.7–5.3)
SODIUM BLD-SCNC: 135 MMOL/L (ref 135–144)

## 2019-04-30 PROCEDURE — 97110 THERAPEUTIC EXERCISES: CPT

## 2019-04-30 PROCEDURE — 6370000000 HC RX 637 (ALT 250 FOR IP): Performed by: NURSE PRACTITIONER

## 2019-04-30 PROCEDURE — 97530 THERAPEUTIC ACTIVITIES: CPT

## 2019-04-30 PROCEDURE — 99232 SBSQ HOSP IP/OBS MODERATE 35: CPT | Performed by: PHYSICAL MEDICINE & REHABILITATION

## 2019-04-30 PROCEDURE — 1180000000 HC REHAB R&B

## 2019-04-30 PROCEDURE — 6370000000 HC RX 637 (ALT 250 FOR IP): Performed by: STUDENT IN AN ORGANIZED HEALTH CARE EDUCATION/TRAINING PROGRAM

## 2019-04-30 PROCEDURE — 80048 BASIC METABOLIC PNL TOTAL CA: CPT

## 2019-04-30 PROCEDURE — 97535 SELF CARE MNGMENT TRAINING: CPT

## 2019-04-30 PROCEDURE — 6370000000 HC RX 637 (ALT 250 FOR IP): Performed by: INTERNAL MEDICINE

## 2019-04-30 PROCEDURE — 36415 COLL VENOUS BLD VENIPUNCTURE: CPT

## 2019-04-30 PROCEDURE — 6370000000 HC RX 637 (ALT 250 FOR IP): Performed by: PHYSICAL MEDICINE & REHABILITATION

## 2019-04-30 PROCEDURE — 6360000002 HC RX W HCPCS: Performed by: NURSE PRACTITIONER

## 2019-04-30 PROCEDURE — 99231 SBSQ HOSP IP/OBS SF/LOW 25: CPT | Performed by: INTERNAL MEDICINE

## 2019-04-30 PROCEDURE — 82947 ASSAY GLUCOSE BLOOD QUANT: CPT

## 2019-04-30 PROCEDURE — 97127 HC SP THER IVNTJ W/FOCUS COG FUNCJ: CPT

## 2019-04-30 RX ORDER — LISINOPRIL 5 MG/1
2.5 TABLET ORAL DAILY
Status: DISCONTINUED | OUTPATIENT
Start: 2019-04-30 | End: 2019-05-09 | Stop reason: HOSPADM

## 2019-04-30 RX ORDER — FUROSEMIDE 20 MG/1
20 TABLET ORAL DAILY
Status: DISCONTINUED | OUTPATIENT
Start: 2019-05-01 | End: 2019-05-09 | Stop reason: HOSPADM

## 2019-04-30 RX ADMIN — INSULIN GLARGINE 35 UNITS: 100 INJECTION, SOLUTION SUBCUTANEOUS at 08:36

## 2019-04-30 RX ADMIN — LISINOPRIL 2.5 MG: 5 TABLET ORAL at 12:02

## 2019-04-30 RX ADMIN — ENOXAPARIN SODIUM 40 MG: 100 INJECTION SUBCUTANEOUS at 08:36

## 2019-04-30 RX ADMIN — OXYCODONE HYDROCHLORIDE 5 MG: 5 TABLET ORAL at 08:35

## 2019-04-30 RX ADMIN — INSULIN LISPRO 4 UNITS: 100 INJECTION, SOLUTION INTRAVENOUS; SUBCUTANEOUS at 08:37

## 2019-04-30 RX ADMIN — ATORVASTATIN CALCIUM 40 MG: 40 TABLET, FILM COATED ORAL at 21:12

## 2019-04-30 RX ADMIN — MULTIPLE VITAMINS W/ MINERALS TAB 1 TABLET: TAB at 08:35

## 2019-04-30 RX ADMIN — DOCUSATE SODIUM 100 MG: 100 CAPSULE, LIQUID FILLED ORAL at 08:35

## 2019-04-30 RX ADMIN — ASPIRIN 81 MG 81 MG: 81 TABLET ORAL at 08:35

## 2019-04-30 RX ADMIN — FAMOTIDINE 20 MG: 20 TABLET ORAL at 08:35

## 2019-04-30 RX ADMIN — INSULIN LISPRO 6 UNITS: 100 INJECTION, SOLUTION INTRAVENOUS; SUBCUTANEOUS at 12:02

## 2019-04-30 RX ADMIN — CLOPIDOGREL BISULFATE 75 MG: 75 TABLET ORAL at 08:35

## 2019-04-30 RX ADMIN — DOCUSATE SODIUM 100 MG: 100 CAPSULE, LIQUID FILLED ORAL at 21:12

## 2019-04-30 RX ADMIN — DOXYCYCLINE 100 MG: 100 CAPSULE ORAL at 21:12

## 2019-04-30 RX ADMIN — INSULIN LISPRO 5 UNITS: 100 INJECTION, SOLUTION INTRAVENOUS; SUBCUTANEOUS at 21:16

## 2019-04-30 RX ADMIN — METOPROLOL TARTRATE 25 MG: 25 TABLET ORAL at 21:14

## 2019-04-30 RX ADMIN — METOPROLOL TARTRATE 25 MG: 25 TABLET ORAL at 08:35

## 2019-04-30 RX ADMIN — DOXYCYCLINE 100 MG: 100 CAPSULE ORAL at 08:35

## 2019-04-30 RX ADMIN — FAMOTIDINE 20 MG: 20 TABLET ORAL at 21:12

## 2019-04-30 RX ADMIN — TRAZODONE HYDROCHLORIDE 50 MG: 50 TABLET ORAL at 21:12

## 2019-04-30 RX ADMIN — INSULIN LISPRO 4 UNITS: 100 INJECTION, SOLUTION INTRAVENOUS; SUBCUTANEOUS at 17:25

## 2019-04-30 RX ADMIN — QUETIAPINE FUMARATE 25 MG: 25 TABLET ORAL at 21:12

## 2019-04-30 RX ADMIN — OXYCODONE HYDROCHLORIDE 5 MG: 5 TABLET ORAL at 21:12

## 2019-04-30 ASSESSMENT — PAIN DESCRIPTION - DESCRIPTORS: DESCRIPTORS: ACHING;DISCOMFORT;DULL

## 2019-04-30 ASSESSMENT — PAIN DESCRIPTION - LOCATION
LOCATION: CHEST
LOCATION: CHEST;LEG;INCISION

## 2019-04-30 ASSESSMENT — PAIN DESCRIPTION - ONSET: ONSET: ON-GOING

## 2019-04-30 ASSESSMENT — PAIN SCALES - GENERAL
PAINLEVEL_OUTOF10: 4
PAINLEVEL_OUTOF10: 4
PAINLEVEL_OUTOF10: 0
PAINLEVEL_OUTOF10: 1
PAINLEVEL_OUTOF10: 5

## 2019-04-30 ASSESSMENT — PAIN DESCRIPTION - ORIENTATION
ORIENTATION: MID
ORIENTATION: MID;RIGHT

## 2019-04-30 ASSESSMENT — PAIN DESCRIPTION - PAIN TYPE
TYPE: ACUTE PAIN;SURGICAL PAIN
TYPE: ACUTE PAIN

## 2019-04-30 ASSESSMENT — PAIN DESCRIPTION - PROGRESSION: CLINICAL_PROGRESSION: NOT CHANGED

## 2019-04-30 ASSESSMENT — PAIN DESCRIPTION - FREQUENCY: FREQUENCY: INTERMITTENT

## 2019-04-30 NOTE — PLAN OF CARE
Problem: Pain:  Goal: Control of acute pain  Description  Control of acute pain  Outcome: Ongoing  Patient assessed for pain, medicated per orders. Patient reports an acceptable level of discomfort with the current medications. Patient was repositioned and cold/heat therapy applied.

## 2019-04-30 NOTE — PLAN OF CARE
Problem: Risk for Impaired Skin Integrity  Goal: Tissue integrity - skin and mucous membranes  Description  Structural intactness and normal physiological function of skin and  mucous membranes. 4/30/2019 0901 by Arslan Fragoso RN  Outcome: Ongoing  Note:   Pt educated on risks for impaired skin integrity, assisted in keeping skin clean and dry. Will continue to monitor and assist as needed. Problem: Falls - Risk of:  Goal: Will remain free from falls  Description  Will remain free from falls  Outcome: Ongoing  Note:   Pt educated on and verbalizes understanding of fall risks. Pt wearing nonskid footwear, uses assistive devices appropriately, and encouraged to ask for assistance as needed. Will continue to monitor and provide assistance as needed. Problem: Pain:  Goal: Control of acute pain  Description  Control of acute pain  4/30/2019 0901 by Arslan Fragoso RN  Outcome: Ongoing  Note:   Pt reports pain being appropriately controlled with prn pain medications available through MD orders. Will continue to monitor and medicate appropriately.

## 2019-04-30 NOTE — PROGRESS NOTES
Physical Medicine & Rehabilitation  Progress Note    4/30/2019 3:00 PM     CC: Ambulatory and ADL dysfunction due to cardiac arrest, CABG, CVA, infected right lower extremity hardware, metabolic encephalopathy and respiratory failure    Subjective:   Feels well. No complaints. Continues with tele-sitter    ROS:  Denies fevers, chills, sweats. No chest pain, palpitations, lightheadedness. Denies coughing, wheezing or shortness of breath. Denies abdominal pain, nausea, diarrhea or constipation. No new areas of joint pain. Denies new areas of numbness or weakness. Denies new anxiety or depression issues. No new skin problems. Rehabilitation:     See team conference note    PT:  Restrictions/Precautions: Cardiac, General Precautions, Surgical Protocols, Fall Risk(telesitter)  Sternal Precautions: Emergent CABG X4  Other position/activity restrictions: Fall Risk  Required Braces or Orthoses  Other: Heart Hugger Brace  Right Lower Extremity Brace: Ankle Foot Orthotics  RLE Brace Type: Profo boot and AFO  Left Lower Extremity Brace: Boot  LLE Brace Type: Profo boot   Transfers  Sit to Stand: Contact guard assistance  Stand to sit: Contact guard assistance  Bed to Chair: Stand by assistance  Stand Pivot Transfers: Contact guard assistance  Comment: Pt used RW. Pt needs verbal cueing for hand placement during transfers with little carry over.  At times pt lose balance posteriorly  Ambulation 1  Surface: level tile  Device: Rolling Walker  Other Apparatus: (bilateral PRAFO BOOTs)  Assistance: Contact guard assistance  Quality of Gait: decreased ton, decreased step length, R foot drop, decreased endurance  Distance: 80 feet x 4  Comments: Right PRAFO boot used in pm limiting gait safety as patient heel wanting to slide out of splint    Transfers  Sit to Stand: Contact guard assistance  Stand to sit: Contact guard assistance  Bed to Chair: Stand by assistance  Stand Pivot Transfers: Contact guard assistance  Comment: Pt used RW. Pt needs verbal cueing for hand placement during transfers with little carry over. At times pt lose balance posteriorly  Ambulation  Ambulation?: Yes  More Ambulation?: Yes  Ambulation 1  Surface: level tile  Device: Rolling Walker  Other Apparatus: (bilateral PRAFO BOOTs)  Assistance: Contact guard assistance  Quality of Gait: decreased ton, decreased step length, R foot drop, decreased endurance  Distance: 80 feet x 4  Comments: Right PRAFO boot used in pm limiting gait safety as patient heel wanting to slide out of splint    Surface: level tile  Ambulation 1  Surface: level tile  Device: Rolling Walker  Other Apparatus: (bilateral PRAFO BOOTs)  Assistance: Contact guard assistance  Quality of Gait: decreased ton, decreased step length, R foot drop, decreased endurance  Distance: 80 feet x 4  Comments: Right PRAFO boot used in pm limiting gait safety as patient heel wanting to slide out of splint        OT:        ST:    Attention: Pt. Tangential, needed cues to redirect back to task.      Recall: short paragraph recall- 80%, 4 word category inclusion- 80%, 90% c cues.        Organization: n/a     Problem Solving/Reasoning: opposittes, correct sentence inconsistencies- 70%, 90% c cues. Objective:  /65   Pulse 85   Temp 97.9 °F (36.6 °C) (Oral)   Resp 16   Ht 5' 8.11\" (1.73 m)   Wt 138 lb 12.8 oz (63 kg)   SpO2 94%   BMI 21.04 kg/m²  I Body mass index is 21.04 kg/m². I   Wt Readings from Last 1 Encounters:   19 138 lb 12.8 oz (63 kg)      Temp (24hrs), Av.3 °F (36.8 °C), Min:97.9 °F (36.6 °C), Max:98.6 °F (37 °C)         GEN: well developed, well nourished, no acute distress  HEENT: Normocephalic atraumatic, EOMI, mucous membranes pink and moist  CV: RRR, no murmurs, rubs or gallops  PULM: CTAB, no rales or rhonchi. Respirations WNL and unlabored  ABD: soft, NT, ND, +BS and equal  NEURO: A&O x3. Sensation intact to light touch.    MSK: 4/5 upper extremities, 4/5 proximal lower extremities, left lower extremity dorsiflexion 0/5, plantarflexion 3/5, right lower extremity plantar and dorsiflexion 0/5  EXTREMITIES: No calf tenderness to palpation bilaterally. No edema BLEs  SKIN: warm dry and intact with good turgor, sternal incision clean, lower extremity graft incision clean and intact, left heel blackened, right medial heel blackened though heels are intact, is sacrum stage II ulcer clean, Right Ant calf incision clean though 1 x 1 cm wound  With packing, L leg wounds clean, R Forearm clean depth, appears clean-no changes and no signs or symptoms of infection  PSYCH: appropriately interactive. Affect WNL.   Good spirits        Medications   Scheduled Meds:   lisinopril  2.5 mg Oral Daily    [START ON 5/1/2019] furosemide  20 mg Oral Daily    metoprolol tartrate  25 mg Oral BID    insulin glargine  35 Units Subcutaneous Daily    traZODone  50 mg Oral Nightly    QUEtiapine  25 mg Oral Nightly    enoxaparin  40 mg Subcutaneous Daily    atorvastatin  40 mg Oral Nightly    clopidogrel  75 mg Oral Daily    famotidine  20 mg Oral BID    therapeutic multivitamin-minerals  1 tablet Oral Daily with breakfast    aspirin  81 mg Oral Daily    docusate  100 mg Oral Daily    doxycycline monohydrate  100 mg Oral 2 times per day    insulin lispro  0-12 Units Subcutaneous TID WC    insulin lispro  0-6 Units Subcutaneous Nightly    docusate sodium  100 mg Oral BID    polyethylene glycol  17 g Oral Daily     Continuous Infusions:   dextrose       PRN Meds:.oxyCODONE **OR** [DISCONTINUED] oxyCODONE, dextrose, dextrose, glucagon (rDNA), glucose, magnesium hydroxide, albuterol, bisacodyl     Diagnostics:     CBC:   Recent Labs     04/29/19  0627   WBC 12.1*   RBC 3.67*   HGB 11.2*   HCT 33.7*   MCV 91.9   RDW 14.8   *     BMP:   Recent Labs     04/28/19  0737 04/29/19  0627 04/30/19  0621    130* 135   K 4.7 4.8 4.2   CL 96* 92* 96*   CO2 29 26 27   BUN 16 20 12   CREATININE 0.51* 0.55* 0.42*     BNP: No results for input(s): BNP in the last 72 hours. PT/INR:   No results for input(s): PROTIME, INR in the last 72 hours. APTT: No results for input(s): APTT in the last 72 hours. CARDIAC ENZYMES: No results for input(s): CKMB, CKMBINDEX, TROPONINT in the last 72 hours. Invalid input(s): CKTOTAL;3  FASTING LIPID PANEL:  Lab Results   Component Value Date    CHOL 56 04/17/2019    HDL 13 (L) 04/17/2019    TRIG 107 04/17/2019     LIVER PROFILE:   No results for input(s): AST, ALT, ALB, BILIDIR, BILITOT, ALKPHOS in the last 72 hours. I/O (24Hr): Intake/Output Summary (Last 24 hours) at 4/30/2019 1500  Last data filed at 4/30/2019 0626  Gross per 24 hour   Intake --   Output 2690 ml   Net -2690 ml       Glu last 24 hour  Recent Labs     04/29/19  1037 04/29/19  1601 04/29/19  2004 04/30/19  0626   POCGLU 374* 267* 314* 200*       No results for input(s): CLARITYU, COLORU, PHUR, SPECGRAV, PROTEINU, RBCUA, BLOODU, BACTERIA, NITRU, WBCUA, LEUKOCYTESUR, YEAST, GLUCOSEU, BILIRUBINUR in the last 72 hours. Other Diagnoses/plan:    1. Ambulatory and ADL dysfunction secondary to cardiac arrest, CABG, CVA, infected right lower extremity hardware, metabolic encephalopathy and respiratory failure-continue rehab efforts , team conference reviewed, discharge plan 5/8  2. Status PTA arrest, emergent cardiac cath, coronary artery disease, myopathy, CABG ×4 on 4/7, BP- , metoprolol,  appreciate cardiology follow-up, lisinopril resumed and Lasix to be started 5/1  3. Status post respiratory arrest, vent, oxygen, extubated 4/19/19-Proventil,  O2 as needed  4.  RLE hardware removal /22/19 , chronic leg osteomyelitis, right foot drop-ankle foot orthoses, weightbearing as tolerated, doxycycline  60 days, leukocytosis trending down, continue dressing and packing-wound care follow-no erythema or drainage-appears to be somewhat improved follow-up with Dr. Karan Valero this Friday-? take pitcher and send versus sending pt out, patient with tele sitter, consider sending to ortho next week if need to be seen  5. miild hyponatremia-improved, monitor  6. Thrombocytosis-monitor, improving  7. Anemia-improving-monitor improving  8. Diabetes-insulin, sliding scale and glucose as above  9. multiple infarct/?right ICA stenting-aspirin and Plavix, Lipitor follow up with Dr. Otis Fitzpatrick 5/21-decision on stenting at that time  10. Encephalopathy-slept better with trazodone, seroquel - attempt  DC in few days if doing well  11. GI-Ischemic hepatitis more also on Pepcid on Colace, MiraLAX , LFTs-continue to improve  12. S/p  acute kidney injury improved-resolved  13. Pain- oxycodone   14. Multiple  wounds, blackened healed, sacral stage II ulcer, leg, arrm-wound care, PRAFO b/l, elevated heels decrease pressure to sacrum-wound care follow all evaluated again today-no signs or symptoms fraction, continues on doxycycline  15. DVTprophylaxis-Lovenox          Vola Sill. Alfred Mcfarland MD       This note is created with the assistance of a speech recognition program.  While intending to generate a document that actually reflects the content of the visit, the document can still have some errors including those of syntax and sound a like substitutions which may escape proof reading.   In such instances, actual meaning can be extrapolated by contextual diversion

## 2019-04-30 NOTE — PROGRESS NOTES
[facility]   Progress Note                   Date:   4/30/2019  Patient name: Rosangela Beck  Date of admission:  4/25/2019  7:49 PM  MRN:   [id number]  YOB: 1965  PCP: No primary care provider on file. Reason for Admission:      Subjective: There were no acute events overnight, remained hemodynamically stable, denies chest pain, dyspnea, orthopnea or palpitations. Patient is sitting up comfortably in chair, reports feeling better - continues to have chest wall soreness but is improving. No SOB. Medications:   Scheduled Meds:   metoprolol tartrate  25 mg Oral BID    insulin glargine  35 Units Subcutaneous Daily    traZODone  50 mg Oral Nightly    QUEtiapine  25 mg Oral Nightly    enoxaparin  40 mg Subcutaneous Daily    atorvastatin  40 mg Oral Nightly    clopidogrel  75 mg Oral Daily    famotidine  20 mg Oral BID    therapeutic multivitamin-minerals  1 tablet Oral Daily with breakfast    aspirin  81 mg Oral Daily    docusate  100 mg Oral Daily    doxycycline monohydrate  100 mg Oral 2 times per day    insulin lispro  0-12 Units Subcutaneous TID WC    insulin lispro  0-6 Units Subcutaneous Nightly    docusate sodium  100 mg Oral BID    polyethylene glycol  17 g Oral Daily       Continuous Infusions:   dextrose         CBC:   Recent Labs     04/29/19  0627   WBC 12.1*   HGB 11.2*   *     BMP:    Recent Labs     04/28/19  0737 04/29/19  0627 04/30/19  0621    130* 135   K 4.7 4.8 4.2   CL 96* 92* 96*   CO2 29 26 27   BUN 16 20 12   CREATININE 0.51* 0.55* 0.42*   GLUCOSE 209* 235* 202*     Hepatic: No results for input(s): AST, ALT, ALB, BILITOT, ALKPHOS in the last 72 hours. Troponin: No results for input(s): TROPONINI in the last 72 hours. BNP: No results for input(s): BNP in the last 72 hours. Lipids: No results for input(s): CHOL, HDL in the last 72 hours.     Invalid input(s): LDLCALCU  INR: No results for input(s): INR in the last 72 hours.    Objective:   Vitals: /67   Pulse 95   Temp 97.9 °F (36.6 °C) (Oral)   Resp 16   Ht 5' 8.11\" (1.73 m)   Wt 138 lb 12.8 oz (63 kg)   SpO2 92%   BMI 21.04 kg/m²     General appearance: awake, alert, in no apparent respiratory distress   HEENT: Head: Normocephalic, no lesions, without obvious abnormality  Neck: no JVD  Lungs: clear to auscultation bilaterally, no basilar rales, no wheezing   Heart: regular rate and rhythm, S1, S2 normal, no murmur, click, rub or gallop  Abdomen: soft, non-tender; bowel sounds normal  Extremities: No LE edema  Neurologic: Mental status: Alert, oriented. Motor and sensory not done. EK19  Normal sinus rhythm  Low voltage QRS  Septal infarct (cited on or before 2019)  T wave abnormality, consider lateral ischemia  Abnormal ECG  When compared with ECG of 2019 15:23,  T wave inversion less evident in Anterior leads         Echocardiogram: 19  Summary  Left ventricle is normal in size. Global left ventricular systolic function  is mildly reduced. Calculated ejection fraction is 40. Left ventricular wall thickness is mildly increased. Grade II diastolic  dysfunction  Normal valvular function    Coronary Angiography: 19   1. Multivessel coronary artery disease.   2. Severe LV systolic dysfunction.   3. Mild aortic insufficiency   4. Distal aorta 40% stenosis.   5. Occluded left external iliac artery.   6. Severe diffuse disease of right iliac artery. Unable to pass IABP or   Impella      Assessment:   1.      Patient Active Problem List:     PAD (peripheral artery disease) (HCC)     Subacute osteomyelitis of right tibia (HCC)     Metabolic encephalopathy     Abnormal CT of the head     Hepatitis     Acute cerebral infarction associated with systemic hypoxia or ischemia     Multi infarct state     Carotid stenosis, bilateral     Right foot drop     Thrombocytosis (HCC)     Acute CVA (cerebrovascular accident) ([facility]     Moderate malnutrition ([facility]    Multivessel CAD s/p cardiac arrest/STEMI s/p emergent cath and CABG    Hypotension - improved  Acute CVA  HFrEF - No signs of fluid overload   DM2    Treatment Plan:   1. Continue Lopressor 25 bid  2. OK to resume po lasix tomorrow if BP remains stable   3. Continue ASA, Lipitor, Plavix   4. Lisinopril and spironolactone stopped due to BP, can be restarted depending on blood pressure     Discussed with patient and nursing. [facility]   [address] [facility]     Attending Cardiologist Addendum: I have reviewed and performed the history, physical, subjective, objective, assessment, and plan with the resident/fellow and agree with the note. I performed the history and physical personally. I have made changes to the note above as needed. Start lisinopril 2.5 mg qd (hold for SBP < 100)  Start lasix 20 mg qd (tomorrow)    Thank you for allowing me to participate in the care of this patient, please do not hesitate to call if you have any questions. Apryl Galvez DO, [facility], [address] [facility]  [facility] [place]  [phone]

## 2019-04-30 NOTE — PROGRESS NOTES
[address]   ACUTE REHABILITATION OCCUPATIONAL THERAPY  DAILY NOTE    Date: 2019  Patient Name: Nixon Wing        MRN: [id number]    : 1965  (46 y.o.)  Gender: male   Referring Practitioner: Sunny Crook MD  Diagnosis: Acute CVA, cardiac arrest, CABG    Restrictions  Restrictions/Precautions: Cardiac, General Precautions, Surgical Protocols, Fall Risk(telesitter)  Sternal Precautions: Emergent CABG X4  Other position/activity restrictions: Fall Risk  Required Braces or Orthoses  Other: Heart Hugger Brace  Right Lower Extremity Brace: Ankle Foot Orthotics  RLE Brace Type: Profo boot and AFO  Left Lower Extremity Brace: Boot  LLE Brace Type: Profo boot      Subjective  Subjective: \"It's weird knowing it was there and now its not\" pt states in regards to removal of vein from LE to complete CABG. Comments: Pt pleasant and cooperative. Pt eager to get up and complete ADL tasks. Patient Currently in Pain: Yes  Pain Level: 4  Pain Location: Chest;Leg;Incision  Pain Orientation: Mid;Right(R leg)  Overall Orientation Status: Within Functional Limits  Orientation Level: Oriented X4      Objective  Cognition  Overall Cognitive Status: Impaired  Safety Judgement: Decreased awareness of need for safety  Insights: Decreased awareness of deficits  Balance  Sitting Balance: Supervision(pt is impulsive at times. )  Standing Balance: Contact guard assistance(SBA-CGA)  Bed mobility  Supine to Sit: Stand by assistance  Transfers  Sit to stand: Contact guard assistance  Stand to sit: Contact guard assistance  Transfer Comments: VC's for proper hand placement to improve safety and cardiac recovery tolerance. Standing Balance  Time: AM: 1-2 min x 3 PM: 12-13 min   Activity: AM: LB dressing /bathing at sink, mobility in room/bathroom. PM: therapeutic task at tabletop to improve standing tolerance for functional tasks.    Comment: minor unsteadiness, fatigues quickly  Functional Mobility  Functional - Mobility Device: Rolling Walker  Activity: To/from bathroom  Assist Level: Contact guard assistance  Functional Mobility Comments: vc's for safe RW mgmt. Type of ROM/Therapeutic Exercise  Type of ROM/Therapeutic Exercise: AROM; Cane/Wand(1#)  Comment: Pt completes BUE ex's to address overall strength and endurance for functional tasks. Pt completed ex's in seated position, slow and controlled, prolonged rest breaks. All planes of mvmt within cardiac restrictions. (2x15-20 reps. )  Exercises  Scapular Protraction: x  Scapular Retraction: x  Shoulder Flexion: x  Shoulder Extension: x  Elbow Flexion: x  Elbow Extension: x  Grasp/Release: 5# digi flex-x1 min each hand to improve hand strength/endurnance. Resistive clothespins-BUE use to retrieve/manipulate and place onto vertical/dowel rods. Pt keeps reaching within cardiac precaution limits, pt demo good tolerance for activity c minimal rest breaks. (intermittent use c each UE to increase endurance )  Other: tabletop reaching intermittently c BUE's-transferring and sequencing numbered pegs. Pt tolerates well c full task completion in standing-tabletop at proper working height to address good EC skills. Functional Mobility  Functional - Mobility Device: Rolling Walker  Activity: To/from bathroom  Assist Level: Contact guard assistance  Functional Mobility Comments: vc's for safe RW mgmt. ADL  Additional Comments: Pt declines use of AE this date-states he wants to try and complete without it if able. Writer Ok'd as long as he is not breaking restrictions and completing tasks slowly while allowing rest breaks prn.      OT FIM:   Eatin - Feeds self with adaptive equipment/dentures and/or feeds self with modified diet and/or performs own tube feeding(Pt reports no A c set up req)  Groomin - Requires setup/cues to do all tasks  Bathin - Able to bathe all 10 areas with setup/sup/cues(set up, VC's, SBA)  Dressing-Upper: 4 - Requires assist with buttons/zippers only and/or requires assist with one arm only(set up, A c adjusting heart hugger)  Dressing-Lower: 5 - Requires setup/supervision/cues and/or staff applies TEDS/prosthesis/brace only  Toiletin - Did not occur  Toilet Transfer: 0 - Did not occur  Primary Mode: Shower  Tub Transfer: 0 - Activity does not occur  Shower Transfer: 0 - Activity does not occur    Assessment  Performance deficits / Impairments: Decreased functional mobility ; Decreased cognition;Decreased high-level IADLs;Decreased endurance;Decreased ADL status; Decreased balance;Decreased safe awareness;Decreased strength  Assessment: Pt was able to ind verbalize 50% of cardiac restrictions, req use of visual aide in room. Pt c good initiation to redonn heart hugger during ADL tasks, fair understanding of proper wearing. Prognosis: Good  Discharge Recommendations: Continue to assess pending progress;Home with assist PRN;Home with Home health OT  Activity Tolerance: Patient Tolerated treatment well;Patient limited by fatigue  Safety Devices in place: Yes  Type of devices: All fall risk precautions in place;Call light within reach; Chair alarm in place; Left in chair;Nurse notified(telesitter. )  Equipment Recommendations  Equipment Needed: (TBD)     Patient Education:  Patient Education: EC/WS, AE, safety c transfers/mobility, re educ cardiac restrictions.    Learner:patient  Method: demonstration       Outcome: acknowledged understanding of and demonstrates understanding and needs reinforcement     Plan  Continue current plan of care        19 0916 19 1456   OT Individual Minutes   Time In 6755 1522   Time Out 0848 1414   Minutes 61 36     Electronically signed by VENANCIO Guan on 19 at 3:12 PM

## 2019-04-30 NOTE — PROGRESS NOTES
oxyCODONE **OR** [DISCONTINUED] oxyCODONE, dextrose, dextrose, glucagon (rDNA), glucose, magnesium hydroxide, albuterol, bisacodyl    Data:     Past Medical History:   has a past medical history of CAD (coronary artery disease), Cardiac arrest ([facility] [address] Osteolysis, PEA (Pulseless electrical activity) ([facility] [address] and PVD (peripheral vascular disease) [facility] Social History:   reports that he has quit smoking. He does not have any smokeless tobacco history on file. Family History: No family history on file. Vitals:  /65   Pulse 85   Temp 97.9 °F (36.6 °C) (Oral)   Resp 16   Ht 5' 8.11\" (1.73 m)   Wt 138 lb 12.8 oz (63 kg)   SpO2 94%   BMI 21.04 kg/m²   Temp (24hrs), Av.3 °F (36.8 °C), Min:97.9 °F (36.6 °C), Max:98.6 °F (37 °C)    Recent Labs     19  1037 19  1601 19  2004 19  0626   POCGLU 374* 267* 314* 200*       I/O (24Hr): Intake/Output Summary (Last 24 hours) at 2019 1537  Last data filed at 2019 8856  Gross per 24 hour   Intake --   Output 2490 ml   Net -2490 ml       Labs:    [unfilled]    Lab Results   Component Value Date/Time    SPECIAL NOT REPORTED 2019 04:08 PM     Lab Results   Component Value Date/Time    CULTURE (A) 2019 04:08 PM     STAPHYLOCOCCUS SPECIES, COAGULASE NEGATIVE SCANT GROWTH Susceptibilities have not been performed. Notify the laboratory within 7 days for further workup if clinically indicated.     CULTURE NO ANAEROBIC ORGANISMS ISOLATED AT 5 DAYS (A) 2019 04:08 PM       [unfilled]    Radiology:      Physical Examination:        General appearance:  alert, cooperative and no distress  Mental Status:  oriented to person, place and time and normal affect  Lungs:  clear to auscultation bilaterally, normal effort  Heart:  regular rate and rhythm, no murmur  Abdomen:  soft, nontender, nondistended, normal bowel sounds, no masses, hepatomegaly, splenomegaly  Extremities: Dressing present and right leg , Splint present Skin:  no gross lesions, rashes, induration    Assessment:        Primary Problem  <principal problem not specified>    Active Hospital Problems    Diagnosis Date Noted    Moderate malnutrition ([facility] [E44.0] 04/26/2019    Acute CVA (cerebrovascular accident) ([facility] [I63.9] 04/25/2019    Acute cerebral infarction associated with systemic hypoxia or ischemia [I63.89]     Subacute osteomyelitis of right tibia ([facility] [W83.434]        Plan:        1. Cardiac arrest, status post CABG with multivessel coronary artery disease  2. Acute stroke. Speech difficulties, patient is on aspirin, Lipitor, Plavix  3. Congestive heart failure, both systolic and diastolic, ejection fraction is 40%, patient is on Lasix, Aldactone, lisinopril, Lopressor  4. Infected implant removed from right leg, she was on doxycycline  5. Diabetes, controlled, on sliding scale and long-acting insulin  6.  On DVT prophylaxis  7.     4/28/19  Doing better   BP controlled   Pain Better   Advising Carb Control diet     4/29   Has Readings of Low Blood Pressure   Hyponatremia   Seen By cardiologist   Diuretics and Antihypertensives on Hold   High Blood sugars   Not Compliant with Diet   Lantus to 35     4/30   Patient blood sugar is better  Started on Lopressor, will resume Lasix from tomorrow, blood pressure is better controlled  Wound on right leg is healthy    Thomas Estrada MD  4/30/2019  3:37 PM

## 2019-04-30 NOTE — PROGRESS NOTES
Physical Therapy  Facility/Department: [facility] ACUTE REHAB  Daily Treatment Note  NAME: Colleen Dorantes  : 1965  MRN: [id number]    Date of Service: 2019    Discharge Recommendations:  Home with assist PRN        Patient Diagnosis(es): There were no encounter diagnoses. has a past medical history of CAD (coronary artery disease), Cardiac arrest ([facility] [address] Osteolysis, PEA (Pulseless electrical activity) ([facility] [address] and PVD (peripheral vascular disease) ([facility] [address] has a past surgical history that includes Cardiac catheterization (2019); Femur Surgery; Tonsillectomy; Coronary artery bypass graft (2019); Hardware Removal (Right, 2019); Tibia fracture surgery (Right); Coronary artery bypass graft (N/A, 2019); RECONSTRUCTIVE REPAIR STERNAL (N/A, 2019); Hardware Removal (Right, 2019); and REMOVE HARDWARE FEMUR (N/A, 2019). Restrictions  Restrictions/Precautions  Restrictions/Precautions: Cardiac, General Precautions, Surgical Protocols, Fall Risk(telesitter)  Required Braces or Orthoses?: Yes(R AFO, R procare boot)  Required Braces or Orthoses  Other: Heart Hugger Brace  Right Lower Extremity Brace: Ankle Foot Orthotics  RLE Brace Type: Profo boot and AFO  Left Lower Extremity Brace: Boot  LLE Brace Type: Profo boot  Position Activity Restriction  Sternal Precautions: No Pushing, No Pulling, 5# Lifting Restrictions  Sternal Precautions: Emergent CABG X4  Other position/activity restrictions: Fall Risk  Subjective   General  Chart Reviewed: Yes  Additional Pertinent Hx: Pt admitted to [facility] ED 19 presenting with cardiac arrest and increased SOB. Pt underwent emergent CABG x4 19 with delayed closure on 19. Pt was intubated from 19 to 19. Pt also underwent screw removal from ORIF of R knee bedside on 19. Hardware removal of R tibia and I&D on 19. Pt admitted to [address] [place] [address] for intense rehabiliation on 19. 19 MRI shows R cerebellum infarct.  CT on 19 shows Lower parietal infarct. Response To Previous Treatment: Patient with no complaints from previous session. Family / Caregiver Present: Yes(friends)  Referring Practitioner: Dr. Sky Ferrer  Subjective  Subjective: patient agreeable to therapy limited by endurance and BLE/foot   General Comment  Comments: heel discolored noted on bilateral LE's; not able to use right AFO with open wound and dressing          Orientation     Cognition      Objective   Bed mobility  Bridging: Stand by assistance  Rolling to Left: Stand by assistance  Rolling to Right: Stand by assistance  Supine to Sit: Stand by assistance  Sit to Supine: Stand by assistance  Scooting: Stand by assistance  Transfers  Sit to Stand: Contact guard assistance  Stand to sit: Contact guard assistance  Bed to Chair: Stand by assistance  Stand Pivot Transfers: Contact guard assistance  Ambulation  Ambulation?: Yes  Ambulation 1  Surface: level tile  Device: Rolling Walker  Other Apparatus: (bilateral PRAFO BOOTs)  Assistance: Contact guard assistance  Quality of Gait: decreased ton, decreased step length, R foot drop, decreased endurance  Distance: 80 feet x 4  Comments: Right PRAFO boot used in pm limiting gait safety as patient heel wanting to slide out of splint  Stairs/Curb  Stairs?: Yes  Stairs  # Steps : 10  Stairs Height: 4\"(and 6\")  Rails: Bilateral  Device: No Device  Other Apparatus: (PRAFO boot (R)LE)  Assistance: Contact guard assistance  Comment: Pt able to verbalize and demonstrate correct step -to sequencing ascending/decending steps today. Other exercises  Other exercises 1: Supine (B) LE ex x 15; 1.5# (B)  Other exercises 2: Standing tolerance 10 minutes with co treatment RT undecorating easter trees.   Other exercises 3: sit<>stand from EOB x 5  Other exercises 4: standing (B) LE ex x 10 at RW  Other exercises 5: NU-step L4 x 10min; (B) LE ok B/UE per PT Lyudmila patient Post OP 4- 5 weeks                        Assessment Activity Tolerance  Activity Tolerance: Patient limited by endurance; Patient limited by fatigue     G-Code     OutComes Score                                                  AM-PAC Score             Goals  Short term goals  Time Frame for Short term goals: 1 week  Short term goal 1: Pt to perform bed mobility mod I  Short term goal 2: Pt to perform transfers CGA with use of RW  Short term goal 3: Pt to amb  ft with RW and CGA with no rest break  Short term goal 4: Pt to ascend/descend 5 steps with bilateral handrail and min A x1  Long term goals  Time Frame for Long term goals : by discharge  Long term goal 1: Pt to perform bed mobility independent  Long term goal 2: pt to perform transfers with least restrictive DME and supervision  Long term goal 3: pt to amb with least restrictive -250 ft supervision  Long term goal 4: Pt to ascend/descend a flight of stairs with R handrail supervision  Long term goal 5: Pt to demonstrate understanding of HEP  Patient Goals   Patient goals : none stated    Plan    Plan  Times per week: 5-7x/wk for 1.5 hours per day  Times per day: Twice a day  Specific instructions for Next Treatment: progress mobility as tolerated  Current Treatment Recommendations: ROM, Strengthening, Functional Mobility Training, Endurance Training, Transfer Training, Gait Training, Stair training, Balance Training, Neuromuscular Re-education, Home Exercise Program, Safety Education & Training, Patient/Caregiver Education & Training, Equipment Evaluation, Education, & procurement  Safety Devices  Type of devices: Call light within reach, Gait belt, Left in chair, All fall risk precautions in place, Chair alarm in place, Nurse notified     Therapy Time     04/30/19 1111 04/30/19 1315 04/30/19 1341   PT Individual Minutes   Time In 1015 1300  --    Time Out 0272 2531  --    Minutes 50 15  --    PT Concurrent Minutes   Time In  --  5861 5666   Time Out  --  1300 1335   Minutes  --  15 20 Debbie Ruvalcaba, PTA

## 2019-04-30 NOTE — PLAN OF CARE
Skin assessment completed this shift. Nutrition and Hydration status assessed with adequate intake. Yash Score as charted. Pressure Relief Overlay remains intact and inflated to patient's bed throughout the shift. Bilateral heels remain elevated on pillows throughout the shift. Patient tolerates repositioning by staff at least every 2 hours. Patient able to reposition self for comfort and to prevent breakdown. Patient verbalizes understanding of pressure ulcer prevention measures. Skin integrity maintained. No new skin breakdown noted. Skin to high risk pressure areas including coccyx and heels are clear. Shana / Incontinence care provided as needed throughout the shift. Aloe Vesta Moisture Barrier ointment applied to buttocks as a preventative measure. Problem: Risk for Impaired Skin Integrity  Goal: Tissue integrity - skin and mucous membranes  Description  Structural intactness and normal physiological function of skin and  mucous membranes. Outcome: Ongoing   Skin assessment completed this shift. Nutrition and Hydration status assessed with adequate intake. Yash Score as charted. Pressure Relief Overlay remains intact and inflated to patient's bed throughout the shift. Bilateral heels remain elevated on pillows throughout the shift. Patient tolerates repositioning by staff at least every 2 hours. Patient able to reposition self for comfort and to prevent breakdown. Patient verbalizes understanding of pressure ulcer prevention measures. Skin integrity maintained. No new skin breakdown noted. Skin to high risk pressure areas including coccyx and heels are clear. Shana / Incontinence care provided as needed throughout the shift. Aloe Vesta Moisture Barrier ointment applied to buttocks as a preventative measure.

## 2019-05-01 LAB
GLUCOSE BLD-MCNC: 131 MG/DL (ref 75–110)
GLUCOSE BLD-MCNC: 175 MG/DL (ref 75–110)
GLUCOSE BLD-MCNC: 221 MG/DL (ref 75–110)
GLUCOSE BLD-MCNC: 261 MG/DL (ref 75–110)

## 2019-05-01 PROCEDURE — 1180000000 HC REHAB R&B

## 2019-05-01 PROCEDURE — 82947 ASSAY GLUCOSE BLOOD QUANT: CPT

## 2019-05-01 PROCEDURE — 99232 SBSQ HOSP IP/OBS MODERATE 35: CPT | Performed by: PHYSICAL MEDICINE & REHABILITATION

## 2019-05-01 PROCEDURE — 6370000000 HC RX 637 (ALT 250 FOR IP): Performed by: INTERNAL MEDICINE

## 2019-05-01 PROCEDURE — 6370000000 HC RX 637 (ALT 250 FOR IP): Performed by: PHYSICAL MEDICINE & REHABILITATION

## 2019-05-01 PROCEDURE — 97127 HC SP THER IVNTJ W/FOCUS COG FUNCJ: CPT

## 2019-05-01 PROCEDURE — 97110 THERAPEUTIC EXERCISES: CPT

## 2019-05-01 PROCEDURE — 6370000000 HC RX 637 (ALT 250 FOR IP): Performed by: NURSE PRACTITIONER

## 2019-05-01 PROCEDURE — 6370000000 HC RX 637 (ALT 250 FOR IP): Performed by: STUDENT IN AN ORGANIZED HEALTH CARE EDUCATION/TRAINING PROGRAM

## 2019-05-01 PROCEDURE — 99231 SBSQ HOSP IP/OBS SF/LOW 25: CPT | Performed by: INTERNAL MEDICINE

## 2019-05-01 PROCEDURE — 97116 GAIT TRAINING THERAPY: CPT

## 2019-05-01 PROCEDURE — 6360000002 HC RX W HCPCS: Performed by: NURSE PRACTITIONER

## 2019-05-01 PROCEDURE — 97535 SELF CARE MNGMENT TRAINING: CPT

## 2019-05-01 PROCEDURE — 97530 THERAPEUTIC ACTIVITIES: CPT

## 2019-05-01 RX ADMIN — INSULIN LISPRO 2 UNITS: 100 INJECTION, SOLUTION INTRAVENOUS; SUBCUTANEOUS at 17:10

## 2019-05-01 RX ADMIN — OXYCODONE HYDROCHLORIDE 5 MG: 5 TABLET ORAL at 12:16

## 2019-05-01 RX ADMIN — ENOXAPARIN SODIUM 40 MG: 100 INJECTION SUBCUTANEOUS at 07:40

## 2019-05-01 RX ADMIN — INSULIN LISPRO 2 UNITS: 100 INJECTION, SOLUTION INTRAVENOUS; SUBCUTANEOUS at 21:34

## 2019-05-01 RX ADMIN — METOPROLOL TARTRATE 25 MG: 25 TABLET ORAL at 21:34

## 2019-05-01 RX ADMIN — DOCUSATE SODIUM 100 MG: 100 CAPSULE, LIQUID FILLED ORAL at 07:36

## 2019-05-01 RX ADMIN — INSULIN GLARGINE 35 UNITS: 100 INJECTION, SOLUTION SUBCUTANEOUS at 07:40

## 2019-05-01 RX ADMIN — DOCUSATE SODIUM 100 MG: 100 CAPSULE, LIQUID FILLED ORAL at 21:32

## 2019-05-01 RX ADMIN — ATORVASTATIN CALCIUM 40 MG: 40 TABLET, FILM COATED ORAL at 21:32

## 2019-05-01 RX ADMIN — METOPROLOL TARTRATE 25 MG: 25 TABLET ORAL at 07:36

## 2019-05-01 RX ADMIN — DOXYCYCLINE 100 MG: 100 CAPSULE ORAL at 21:32

## 2019-05-01 RX ADMIN — ASPIRIN 81 MG 81 MG: 81 TABLET ORAL at 07:36

## 2019-05-01 RX ADMIN — CLOPIDOGREL BISULFATE 75 MG: 75 TABLET ORAL at 07:36

## 2019-05-01 RX ADMIN — FAMOTIDINE 20 MG: 20 TABLET ORAL at 07:36

## 2019-05-01 RX ADMIN — TRAZODONE HYDROCHLORIDE 50 MG: 50 TABLET ORAL at 21:32

## 2019-05-01 RX ADMIN — MULTIPLE VITAMINS W/ MINERALS TAB 1 TABLET: TAB at 07:36

## 2019-05-01 RX ADMIN — LISINOPRIL 2.5 MG: 5 TABLET ORAL at 07:37

## 2019-05-01 RX ADMIN — INSULIN LISPRO 6 UNITS: 100 INJECTION, SOLUTION INTRAVENOUS; SUBCUTANEOUS at 12:16

## 2019-05-01 RX ADMIN — OXYCODONE HYDROCHLORIDE 5 MG: 5 TABLET ORAL at 07:40

## 2019-05-01 RX ADMIN — OXYCODONE HYDROCHLORIDE 5 MG: 5 TABLET ORAL at 21:32

## 2019-05-01 RX ADMIN — FUROSEMIDE 20 MG: 20 TABLET ORAL at 07:36

## 2019-05-01 RX ADMIN — FAMOTIDINE 20 MG: 20 TABLET ORAL at 21:32

## 2019-05-01 RX ADMIN — DOXYCYCLINE 100 MG: 100 CAPSULE ORAL at 07:36

## 2019-05-01 ASSESSMENT — PAIN DESCRIPTION - PAIN TYPE
TYPE: ACUTE PAIN;SURGICAL PAIN
TYPE: ACUTE PAIN;SURGICAL PAIN

## 2019-05-01 ASSESSMENT — PAIN DESCRIPTION - ORIENTATION
ORIENTATION: RIGHT
ORIENTATION: MID

## 2019-05-01 ASSESSMENT — PAIN DESCRIPTION - LOCATION
LOCATION: CHEST
LOCATION: KNEE
LOCATION: CHEST;INCISION

## 2019-05-01 ASSESSMENT — PAIN SCALES - GENERAL
PAINLEVEL_OUTOF10: 2
PAINLEVEL_OUTOF10: 3
PAINLEVEL_OUTOF10: 4
PAINLEVEL_OUTOF10: 2
PAINLEVEL_OUTOF10: 5
PAINLEVEL_OUTOF10: 2
PAINLEVEL_OUTOF10: 3

## 2019-05-01 ASSESSMENT — PAIN DESCRIPTION - FREQUENCY: FREQUENCY: INTERMITTENT

## 2019-05-01 ASSESSMENT — PAIN DESCRIPTION - ONSET
ONSET: ON-GOING
ONSET: ON-GOING

## 2019-05-01 ASSESSMENT — PAIN DESCRIPTION - DESCRIPTORS: DESCRIPTORS: ACHING;DISCOMFORT

## 2019-05-01 ASSESSMENT — PAIN DESCRIPTION - PROGRESSION: CLINICAL_PROGRESSION: NOT CHANGED

## 2019-05-01 NOTE — PROGRESS NOTES
[facility]    Progress Note    5/1/2019    3:29 PM    Name:   Nixon Wing  MRN:     [id number]     Acct:      [de-identified]   Room:   [address] [place] [address] Day:  6  Admit Date:  4/25/2019  7:49 PM    PCP:   No primary care provider on file. Code Status:  Full Code    Subjective:     C/C: No chief complaint on file. Interval History Status: improved. HPI:   Patient had a cardiac arrest, found to have multivessel coronary artery disease, had CABG, patient had Stroke with Speech Difficulties   Infected Hard ware was Removed From Right leg     Review of Systems:     Constitutional:  negative for chills, fevers, sweats  Respiratory:  negative for cough, dyspnea on exertion, hemoptysis, shortness of breath, wheezing  Cardiovascular:  negative for chest pain, chest pressure/discomfort, lower extremity edema, palpitations  Gastrointestinal:  negative for abdominal pain, constipation, diarrhea, nausea, vomiting  Neurological:  Speech difficulties, foot drop present in right leg    Medications:      Allergies:  No Known Allergies    Current Meds:   Scheduled Meds:    lisinopril  2.5 mg Oral Daily    furosemide  20 mg Oral Daily    metoprolol tartrate  25 mg Oral BID    insulin glargine  35 Units Subcutaneous Daily    traZODone  50 mg Oral Nightly    enoxaparin  40 mg Subcutaneous Daily    atorvastatin  40 mg Oral Nightly    clopidogrel  75 mg Oral Daily    famotidine  20 mg Oral BID    therapeutic multivitamin-minerals  1 tablet Oral Daily with breakfast    aspirin  81 mg Oral Daily    docusate  100 mg Oral Daily    doxycycline monohydrate  100 mg Oral 2 times per day    insulin lispro  0-12 Units Subcutaneous TID WC    insulin lispro  0-6 Units Subcutaneous Nightly    docusate sodium  100 mg Oral BID    polyethylene glycol  17 g Oral Daily     Continuous Infusions:    dextrose       PRN Meds: oxyCODONE **OR** [DISCONTINUED] oxyCODONE, dextrose, dextrose, glucagon (rDNA), glucose, magnesium hydroxide, albuterol, bisacodyl    Data:     Past Medical History:   has a past medical history of CAD (coronary artery disease), Cardiac arrest ([facility] Osteolysis, PEA (Pulseless electrical activity) ([facility] and [facility]peripheral vascular [facility] Social History:   reports that he has quit smoking. He does not have any smokeless tobacco history on file. Family History: No family history on file. Vitals:  BP (!) 140/69   Pulse 95   Temp 98.1 °F (36.7 °C) (Oral)   Resp 15   Ht 5' 8.11\" (1.73 m)   Wt 134 lb (60.8 kg)   SpO2 98%   BMI 20.31 kg/m²   Temp (24hrs), Av °F (36.7 °C), Min:97.9 °F (36.6 °C), Max:98.1 °F (36.7 °C)    Recent Labs     19  1546 19  0643 19  1203   POCGLU 204* 355* 131* 261*       I/O (24Hr): Intake/Output Summary (Last 24 hours) at 2019 1529  Last data filed at 2019 0504  Gross per 24 hour   Intake --   Output 1600 ml   Net -1600 ml       Labs:    [unfilled]    Lab Results   Component Value Date/Time    SPECIAL NOT REPORTED 2019 04:08 PM     Lab Results   Component Value Date/Time    CULTURE (A) 2019 04:08 PM     STAPHYLOCOCCUS SPECIES, COAGULASE NEGATIVE SCANT GROWTH Susceptibilities have not been performed. Notify the laboratory within 7 days for further workup if clinically indicated.     CULTURE NO ANAEROBIC ORGANISMS ISOLATED AT 5 DAYS (A) 2019 04:08 PM       @[facility] [email or web address]@    Radiology:      Physical Examination:        General appearance:  alert, cooperative and no distress  Mental Status:  oriented to person, place and time and normal affect  Lungs:  clear to auscultation bilaterally, normal effort  Heart:  regular rate and rhythm, no murmur  Abdomen:  soft, nontender, nondistended, normal bowel sounds, no masses, hepatomegaly, splenomegaly  Extremities: Dressing present and right leg , Splint present   Skin:  no gross lesions, rashes, induration    Assessment: Primary Problem  <principal problem not specified>    Active Hospital Problems    Diagnosis Date Noted    Moderate malnutrition ([facility] [E44.0] 04/26/2019    Acute CVA (cerebrovascular accident) ([facility] [I63.9] 04/25/2019    Acute cerebral infarction associated with systemic hypoxia or ischemia [I63.89]     Subacute osteomyelitis of right tibia ([facility] [K08.091]        Plan:        1. Cardiac arrest, status post CABG with multivessel coronary artery disease  2. Acute stroke. Speech difficulties, patient is on aspirin, Lipitor, Plavix  3. Congestive heart failure, both systolic and diastolic, ejection fraction is 40%, patient is on Lasix, Aldactone, lisinopril, Lopressor  4. Infected implant removed from right leg, she was on doxycycline  5. Diabetes, controlled, on sliding scale and long-acting insulin  6.  On DVT prophylaxis  7.     4/28/19  Doing better   BP controlled   Pain Better   Advising Carb Control diet     4/29   Has Readings of Low Blood Pressure   Hyponatremia   Seen By cardiologist   Diuretics and Antihypertensives on Hold   High Blood sugars   Not Compliant with Diet   Lantus to 35     4/30   Patient blood sugar is better  Started on Lopressor, will resume Lasix from tomorrow, blood pressure is better controlled  Wound on right leg is healthy    5/1  Patient doing better,  Liver enzymes are  improvement  Blood sugars better controlled  Nicole Watkins MD  5/1/2019  3:29 PM

## 2019-05-01 NOTE — PROGRESS NOTES
Speech Language Pathology  Speech Language Pathology  [facility]    Cognitive Treatment Note    Date: 5/1/2019  Patients Name: Karrie Zapata  MRN: [id number]  Diagnosis:   Patient Active Problem List   Diagnosis Code    PAD (peripheral artery disease) ([facility] I73.9    Subacute osteomyelitis of right tibia ([facility] G82.551    Metabolic encephalopathy R08.00    Abnormal CT of the head R93.0    Hepatitis K75.9    Acute cerebral infarction associated with systemic hypoxia or ischemia I63.89    Multi infarct state I69.30    Carotid stenosis, bilateral I65.23    Right foot drop M21.371    Thrombocytosis (HCC) D47.3    Acute CVA (cerebrovascular accident) ([facility] I63.9    Moderate malnutrition ([facility] E44.0       Pain: 0/10    Cognitive Treatment    Treatment time: 4991-3927      Subjective: [x] Alert [x] Cooperative     [] Confused     [] Agitated    [] Lethargic      Objective/Assessment:  Attention: Pt. Tangential, needed cues to redirect back to task. Recall: short paragraph recall- 60%, 100% c cues, 4 word scrambled sentence- 100%. Picture detail recall- 70%. Organization: n/a    Problem Solving/Reasoning: word deductions- 100%. Other:     Plan:  [x] Continue ST services    [] Discharge from ST:      Discharge recommendations: [] Inpatient Rehab   [] [place]   [] Outpatient Therapy  [] Follow up at trauma clinic   [] Other:       Treatment completed by: Francisco Choi. ROSS/SLP

## 2019-05-01 NOTE — PLAN OF CARE
Problem: Risk for Impaired Skin Integrity  Goal: Tissue integrity - skin and mucous membranes  Description  Structural intactness and normal physiological function of skin and  mucous membranes. 5/1/2019 0915 by Marcus Pineda RN  Outcome: Ongoing  Note:   Pt monitored and assessed multiple times per shift. Dressings changed per wound care orders. Pt assisted in keeping skin clean and dry, pt repositions self at will. Will continue to monitor and assist as needed. Problem: Falls - Risk of:  Goal: Will remain free from falls  Description  Will remain free from falls  5/1/2019 0915 by Marcus Pineda RN  Outcome: Ongoing  Note:   Pt educated on and verbalizes understanding of fall risks. Pt at times displays impulsive behaviors, telesitter in pt room to assist in pt safety. Pt wearing nonskid stockings, uses assistive devices appropriately, encouraged to ask for assistance. Will continue to monitor and intervene as needed. Problem: Pain:  Goal: Control of acute pain  Description  Control of acute pain  Outcome: Ongoing  Note:   Pt reports pain medications are adequately managing pain levels. Will continue to monitor and medicate per MD orders.

## 2019-05-01 NOTE — PROGRESS NOTES
19 1343   OT Individual Minutes   Time In 1300   Time Out 400 [facility]   ACUTE REHABILITATION OCCUPATIONAL THERAPY  DAILY NOTE    Date: 19  Patient Name: Rosangela Beck      Room: 1175/9696-60    MRN: [id number]   : 1965  (46 y.o.)  Gender: male   Referring Practitioner: Sondra Dominguez MD  Diagnosis: Acute CVA, cardiac arrest, CABG  Additional Pertinent Hx: Pt admitted to [facility] ED 19 with cardiac arrest and increased SOB. Pt underwent emergent CABG x4 19 with delayed closure on 19. Pt was intubated from 19 to 19. Pt also underwent screw removal from ORIF of R knee on 19 and I&D on the R knee 19. Pt admitted to [facility] for rehabiliation on 19    Restrictions  Restrictions/Precautions: Cardiac, General Precautions, Surgical Protocols, Fall Risk(telesitter)  Sternal Precautions: Emergent CABG X4  Other position/activity restrictions: Fall Risk  Required Braces or Orthoses  Other: Heart Hugger Brace  Right Lower Extremity Brace: Ankle Foot Orthotics  RLE Brace Type: Profo boot and AFO  Left Lower Extremity Brace: Boot  LLE Brace Type: Profo boot  Required Braces or Orthoses?: Yes(R AFO, R procare boot)    Subjective \"I don't wear the boot, it throws me off\"   Patient Currently in Pain: No   slight pain in right heel per patient              Objective Pt ambulated without SOB using r/w with no noted toe drag until reaching carpeted surface. Decreased concentration to task requiring min vc's to redirect. Reviewed norm for BP, h/r, O2 levels. Per pt having carotid artery sx this Friday if blood work results fall within norms.   Continue POC      Balance  Standing Balance: Stand by assistance        Functional Mobility  Functional - Mobility Device: Rolling Walker  Activity: Other  Assist Level: Stand by assistance  Functional Mobility Comments: from patients room to OT, one break required   Toilet Transfers  Toilet - Technique: Ambulating(with r/w )  Equipment Used: Grab bars  Toilet Transfer: Supervision     Type of ROM/Therapeutic Exercise  Type of ROM/Therapeutic Exercise: AROM  Exercises  Finger Flexion: x  Finger Extension: x  Grasp/Release: x                                  OT FIM: See AM notes                   Assessment  Performance deficits / Impairments: Decreased functional mobility ; Decreased cognition;Decreased high-level IADLs;Decreased endurance;Decreased ADL status; Decreased balance;Decreased safe awareness;Decreased strength  Assessment: noted increase R foot drop during mobility this session-notified MARIA E Sullivan. Prognosis: Good  Activity Tolerance: Patient Tolerated treatment well  Safety Devices in place: Yes  Type of devices: All fall risk precautions in place;Call light within reach; Chair alarm in place; Left in chair;Nurse notified(telesitter )          Patient Education:  Patient Goals   Patient goals : to get better     Learner:patient  Method: explanation       Outcome: needs reinforcement     Plan  Plan  Times per week: 5-7x/wk 1.5 hr/day   Current Treatment Recommendations: Balance Training, Functional Mobility Training, Endurance Training, Equipment Evaluation, Education, & procurement, Home Management Training, Patient/Caregiver Education & Training, Self-Care / ADL, Safety Education & Training, Pain Management, Strengthening  Patient Goals   Patient goals : to get better  Short term goals  Time Frame for Short term goals: in 7 days, pt will  Short term goal 1: perform functional transfers with CGA and LRD  Short term goal 2: perform LB ADL tasks with Min A   Short term goal 3: V/D Good understanding of AE and/or modified/compensatory techniques for increased safety and independence with self care and mobility tasks  Short term goal 4: V/D Good understanding of sternal precautions as they relate to self care and mobility tasks  Short term goal 5: increase standing tolerance to 5-7 minutes with 0-1 UE support during functional task  Short term goal 6: actively participate in 30 min therapeutic exercise/activity to promote increased safety and independence with self care and mobility tasks   Long term goals  Time Frame for Long term goals : by discharge, pt will  Long term goal 1: perform functional transfer and functional mobility with Mod I and LRD  Long term goal 2: perform BADL tasks with Mod I   Long term goal 3: demonstrate Good safety throughout all ADL/mobility tasks  Long term goal 4: perform simple IADL tasks with Mod I        Electronically signed by REGINE Malloy on 5/1/19 at 1:44 PM

## 2019-05-01 NOTE — PROGRESS NOTES
Patito Ortega, RN   Registered Nurse   Acute Rehab   Progress Notes   Signed   Date of Service:  2019  3:48 PM          Related encounter: ED to Hosp-Admission (Discharged) from 2019 in [facility] [address]         Signed                   Show:Clear all  [x]Manual[x]Template[x]Copied    Added by:  [x]Amanda Reynolds RN      []Eric for details      [facility]  Acute Inpatient Rehab Preadmission Assessment     Patient Name: Wale Muse        MRN:   [id number]    : 1965  (46 y.o.)  Gender: male      Admitted from:   [facility]  [x][facility]   []Outside Admission - Location:                                 [x]Initial         []Updated     Date of Onset / admission to the acute hospital:  19     Impairment group:   1.3 Stroke     Did patient have surgery? []No  [x]Yes:   Procedure performed:        [x] Left Heart Catheterization.                             [] Graft Angiography. [x] Left Ventriculography.                                    [] Right Heart Catheterization. [x] Coronary Angiography.                                  [] Aortic Valve Studies. [] PCI:                         PROCEDURES:  Emergent CPR into OR, immediate placement on bypass.  The  patient had coronary bypass grafting x4:  1.  TRUONG to LAD, this was harvested on bypass while awaiting vein to  expedite procedure. 2.  Saphenous vein graft to PDA. 3.  Saphenous vein graft to OM sequential.  4.  Saphenous vein graft to distal circ.  This was performed on-pump. Chest was left open, unable to close due to cardiac swelling and edema.    ANGELA Hackett. Jack Holman, of note, left Cordis in IJ left for backup for  [facility] ECMO if necessary and no intraaortic balloon pump on account of  severe peripheral vascular disease.  Currently, not absolutely  necessarily due to adequate contractility and amenable to pressors;  otherwise, we will place ECMO.  If the patient deteriorates, we will  consider axillary Impella prior to ECMO consideration, however.  The  patient planned to return to OR tomorrow for elective closure.     Procedure(s):  HARDWARE REMOVAL RIGHT TIBIA, I&D WITH SHARP EXCISIONAL DEBRIDEMENT OF SUBCUTANEOUS TISSUE AND BONE           Physicians: Mauricio Lewis, Scott Guerra, Freddie Hernández, Erma Kincaid     Risk for clinical complications:  High     Co-morbidities:     CAD (coronary artery disease) 04/07/2019    [facility]    Osteolysis 04/07/2019     R Knee hardware    PEA (Pulseless electrical activity) [facility] 04/07/2019    PVD (peripheral vascular disease) ([facility] 04/07/2019                     Financial Information  Primary insurance:  []Medicare         [] Medicare HMO           []Commercial insurance    [x]Medicaid          [] Medicaid HMO           []Workers Compensation            []Personal Pay     Secondary Insurance:  []Medicare         [] Medicare HMO           []Commercial insurance    []Medicaid          []Workers Compensation          [x]None     Precautions:   [x]Cardiac Precautions   []Total hip precautions    []Weight Bearing status:  [x]Safety Precautions/Concerns  Sternal Precautions: No Pushing, No Pulling, 5# Lifting Restrictions        []Visually impaired                     []Hard of Hearing      Isolation Precautions:         []Yes                   [x]No  If Yes:   [] Droplet  []Contact        []Airborne     []VRE     []MRSA                []C-diff         [] TB               [] Other:                         Physiatrist:  [x]         Patients Occupation: Employed part time     Reviewed Lab and Diagnostic reports from Current Admission: Yes     Patients Prior Functional  Level: Prior Function  Lives With: Other (comment)  ADL Assistance: Independent  Homemaking Assistance: Independent  Ambulation Assistance: Independent  Transfer Assistance: Independent  Additional Comments: 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  Pt does not use any AD or DME.      History of current illness:  Cuca Serna a 46 y. o. male admitted to [place] [address] on 4/7/2019.  Patient was admitted to the hospital for dyspnea and difficulty breathing.  He was found to have PEA arrest and to stay me. [facility] underwent an emergent cardiac cath which revealed multivessel CAD with severe LV dysfunction with EF of 15% and severe hypokinesis.  He underwent an emergent CABG ×4 on 4/7/2019 with closure of chest wall on 4/8/2019.  The patient had bedside screw removal of right hardware on 4/7/2019. [facility] was treated for acute kidney injury post CABG which was improved with fluid resuscitation and pressors support.  CT head revealed scattered areas of encephalomalacia in bifrontal and left parietal areas consistent with remote infarct and possible subacute infarct in the left lower parietal lobe.  Patient underwent hardware removal of right tibia with I&D and sharp debridement of subcutaneous tissue and bone on 4/22/2019 by Dr. Klaus Allen.              Current functional status for upper extremity ADLs:  UE Bathing: Minimal assistance, Setup, Verbal cueing, Increased time to complete(w/back)  UE Dressing: Moderate assistance, Setup, Verbal cueing, Increased time to complete(w/gown)     Current functional status for lower extremity ADLs:  LE Bathing: Moderate assistance, Setup, Verbal cueing, Increased time to complete(w/lower legs/feet)  LE Dressing: Maximum assistance, Setup, Verbal cueing, Increased time to complete(w/footies)     Current functional status for bed, chair, wheelchair transfers:  Transfers  Sit to Stand:  Moderate Assistance  Stand to sit: Moderate Assistance  Squat Pivot Transfers: Maximum Assistance  Comment: Pt not able to assist much with SPT secondary to LE and ankle/foot weakness.     Current functional status for toilet transfers:  Maximum assistance           Current functional status for locomotion:  Ambulation  Ambulation?: Yes  Ambulation 1  Surface: level tile  Device: Rolling Walker  Assistance: Moderate assistance  Quality of Gait: WBAT Right LE, Flexed posture  Distance: Pt stood at walker and was not able to advance left LE. Stood x90 seconds. attemtped marching in place. Pt not able to clear foot from floor. Comments: Posterior LOB x2     Current functional status for comprehension: Minimal contact assistance     Current functional status for expression: Minimal contact assistance     Current functional status for social interaction: Minimal contact assistance     Current functional status for problem solving: Minimal contact assistance     Current functional status for memory: Minimal contact assistance     Current Deficits R/T Impairment: Impaired Functional Mobility and Decreased ADLs     Required Therapy:   [x] Physical Therapy  [x] Occupational Therapy   [x] Speech Therapy     Additional Services:  [x]   [x] Recreational Therapy  [x] Nutrition             [] Prosthetist/Orthotist  [] Dialysis  [] Other: n/a     Rehab Justification:  Needs 3 hrs therapy per day or 15 hours per week:  Yes  Identified Rehab Nursing needs: Yes  Intense Interdisciplinary need:  Yes  Need for 24 hr physician supervision:  Yes  Measurable improved quality of life:  Yes  Willingness to participate:  Yes  Medical Necessity:  Yes  Patient able to tolerate care proposed: Yes     Expected Discharge Destination/Functional Level:  Home with assist     Expected length of time to achieve that level of improvement: 1-2 weeks  Expected Post Discharge Treatments: Home Care      Other information relevant to the care needs:  n/a     Acute Inpatient Rehabilitation Disclosure Statement provided to patient. Patient verbalized understanding.   Copy placed on patient's light chart.     I have reviewed and concur with the findings and results of the pre-admission screening assessment completed by the Inpatient Rehabilitation Admissions Coordinator.       Cosigned by: Jorge Garnica MD at 4/25/2019  4:33 PM

## 2019-05-01 NOTE — PROGRESS NOTES
[facility]   ACUTE REHABILITATION OCCUPATIONAL THERAPY  DAILY NOTE    Date: 2019  Patient Name: Wale Muse        MRN: [id number]    : 1965  (46 y.o.)  Gender: male   Referring Practitioner: America Mcburney, MD  Diagnosis: Acute CVA, cardiac arrest, CABG    Restrictions  Restrictions/Precautions: Cardiac, General Precautions, Surgical Protocols, Fall Risk(telesitter)  Sternal Precautions: Emergent CABG X4  Other position/activity restrictions: Fall Risk  Required Braces or Orthoses  Other: Heart Hugger Brace  Right Lower Extremity Brace: Ankle Foot Orthotics  RLE Brace Type: Profo boot and AFO  Left Lower Extremity Brace: Boot  LLE Brace Type: Profo boot      Subjective  Subjective: \"I feel a little light headed\" pt reports following therapeutic standing activity-symptom diminshed once seated. Comments: Pt pleasant and cooperative. Patient Currently in Pain: Yes  Pain Level: 2  Pain Location: Chest;Incision     Objective  Cognition  Overall Cognitive Status: Impaired  Safety Judgement: Decreased awareness of need for safety  Insights: Decreased awareness of deficits  Balance  Sitting Balance: Supervision  Standing Balance: Contact guard assistance(SBA-CGA)  Transfers  Stand Step Transfers: Contact guard assistance  Sit to stand: Contact guard assistance(SBA-CGA)  Stand to sit: Contact guard assistance(SBA-CGA)  Transfer Comments: pt c fair hand placement-req cues, fair use of cardiac vest during sit<>stands. Standing Balance  Time: 6 min, 2-3 min, 5-6 min   Activity: sinkside grooming tasks, toileting tasks, mobility in room, therapeutic standing using 1-2 UE support in therapy gym. Comment: minor unsteadiness  Functional Mobility  Functional - Mobility Device: Rolling Walker  Activity: To/from bathroom; Other  Assist Level: Contact guard assistance  Functional Mobility Comments: vc's for safe RW mgmt.    Toilet Transfers  Toilet - Technique: Ambulating  Equipment Used: Hilda Moscoso bars  Toilet Transfer: Contact guard assistance(SBA-CGA)     Type of ROM/Therapeutic Exercise  Type of ROM/Therapeutic Exercise: AROM; Cane/Wand(increased to 2# bar this date. )  Comment: Pt completes BUE ex's to address overall strength and endurance for functional tasks. Pt completed ex's in seated position, slow and controlled, prolonged rest breaks. All planes of mvmt within cardiac restrictions. Increased fatigue noted this date d/t increased weight. (2x15 reps )  Exercises  Scapular Protraction: x  Scapular Retraction: x  Shoulder Flexion: x  Shoulder Extension: x  Elbow Flexion: x  Elbow Extension: x  Grasp/Release: 5# digit flex 2x30 sec reps to increase B hand strength/endurance. Other: seated cone reaching intermittently c BUE crossing midline to address overall endurance. pt demo good understanding of when reach is breaking sternal precautions. Pt also participated in weighted bean bag retrieval from floor level using reacher to maintain good safety and sternal precautions. Pt states AE will be helpful in his room to retrieve items independently. Functional Mobility  Functional - Mobility Device: Rolling Walker  Activity: To/from bathroom; Other  Assist Level: Contact guard assistance  Functional Mobility Comments: vc's for safe RW mgmt.       OT FIM:   Eatin - Feeds self with adaptive equipment/dentures and/or feeds self with modified diet and/or performs own tube feeding  Groomin - Requires setup/cues to do all tasks(set up, SBA sinkside. )  Bathin - Did not occur(pt declined at this time. )  Dressing-Upper: 5 - Requires setup/supervision/cues and/or requires assist with presthesis/brace only(A to adjust cardiac vest. )  Dressing-Lower: 0 - Did not occur  Toiletin - Requires steadying assistance only(SBA-CGA for clothing mgmt. )  Toilet Transfer: 5 - Requires setup/supervision/cues  Primary Mode: Shower  Tub Transfer: 0 - Activity does not occur  Shower Transfer: 0 - Activity does not occur(pt declined. )    Assessment  Performance deficits / Impairments: Decreased functional mobility ; Decreased cognition;Decreased high-level IADLs;Decreased endurance;Decreased ADL status; Decreased balance;Decreased safe awareness;Decreased strength  Assessment: noted increase R foot drop during mobility this session-notified RN Francisco Free. Prognosis: Good  Discharge Recommendations: Continue to assess pending progress;Home with assist PRN;Home with Home health OT  Activity Tolerance: Patient Tolerated treatment well;Patient limited by fatigue  Safety Devices in place: Yes  Type of devices: All fall risk precautions in place;Call light within reach; Chair alarm in place; Left in chair;Nurse notified(telesitter)        Plan  Continue current plan of care        05/01/19 0950   OT Individual Minutes   Time In 0829   Time Out 0926   Minutes 57     Electronically signed by VENANCIO Patton on 5/1/19 at 10:53 AM

## 2019-05-01 NOTE — PROGRESS NOTES
Physical Medicine & Rehabilitation  Progress Note    5/1/2019 2:33 PM     CC: Ambulatory and ADL dysfunction due to cardiac arrest, CABG, CVA, infected right lower extremity hardware, metabolic encephalopathy and respiratory failure    Subjective:   Feels well. No complaints. Continues with agur-bzqupw-ypzhrhbyqry decrease, sits on edge of bed at times but not attempting to get up bowels and bladder okay. ROS:  Denies fevers, chills, sweats. No chest pain, palpitations, lightheadedness. Denies coughing, wheezing or shortness of breath. Denies abdominal pain, nausea, diarrhea or constipation. No new areas of joint pain. Denies new areas of numbness or weakness. Denies new anxiety or depression issues. No new skin problems. Rehabilitation:     PT:  Restrictions/Precautions: Cardiac, General Precautions, Surgical Protocols, Fall Risk(telesitter)  Sternal Precautions: Emergent CABG X4  Other position/activity restrictions: Fall Risk  Required Braces or Orthoses  Other: Heart Hugger Brace  Right Lower Extremity Brace: Ankle Foot Orthotics  RLE Brace Type: Profo boot and AFO  Left Lower Extremity Brace: Boot  LLE Brace Type: Profo boot   Transfers  Sit to Stand: Contact guard assistance  Stand to sit: Contact guard assistance  Bed to Chair: Stand by assistance  Stand Pivot Transfers: Contact guard assistance  Squat Pivot Transfers: Maximum Assistance  Comment: Pt used RW. Pt needs verbal cueing for hand placement during transfers with little carry over.  At times pt lose balance posteriorly  Ambulation 1  Surface: level tile  Device: Rolling Walker  Other Apparatus: (bilateral PRAFO BOOTs)  Assistance: Contact guard assistance  Quality of Gait: decreased ton, decreased step length, R foot drop, decreased endurance  Distance: 80 feet x 4  Comments: Right PRAFO boot used in pm limiting gait safety as patient heel wanting to slide out of splint    Transfers  Sit to Stand: Contact guard assistance  Stand to sit: Contact guard assistance  Bed to Chair: Stand by assistance  Stand Pivot Transfers: Contact guard assistance  Squat Pivot Transfers: Maximum Assistance  Comment: Pt used RW. Pt needs verbal cueing for hand placement during transfers with little carry over.  At times pt lose balance posteriorly  Ambulation  Ambulation?: Yes  More Ambulation?: Yes  Ambulation 1  Surface: level tile  Device: Rolling Walker  Other Apparatus: (bilateral PRAFO BOOTs)  Assistance: Contact guard assistance  Quality of Gait: decreased ton, decreased step length, R foot drop, decreased endurance  Distance: 80 feet x 4  Comments: Right PRAFO boot used in pm limiting gait safety as patient heel wanting to slide out of splint    Surface: level tile  Ambulation 1  Surface: level tile  Device: Rolling Walker  Other Apparatus: (bilateral PRAFO BOOTs)  Assistance: Contact guard assistance  Quality of Gait: decreased ton, decreased step length, R foot drop, decreased endurance  Distance: 80 feet x 4  Comments: Right PRAFO boot used in pm limiting gait safety as patient heel wanting to slide out of splint        OT:  OT FIM:   Eatin - Feeds self with adaptive equipment/dentures and/or feeds self with modified diet and/or performs own tube feeding  Groomin - Requires setup/cues to do all tasks(set up, SBA sinkside. )  Bathin - Did not occur(pt declined at this time. )  Dressing-Upper: 5 - Requires setup/supervision/cues and/or requires assist with presthesis/brace only(A to adjust cardiac vest. )  Dressing-Lower: 0 - Did not occur  Toiletin - Requires steadying assistance only(SBA-CGA for clothing mgmt. )  Toilet Transfer: 5 - Requires setup/supervision/cues  Primary Mode: Shower  Tub Transfer: 0 - Activity does not occur  Shower Transfer: 0 - Activity does not occur(pt declined. )          ST:    Pending                   Objective:  BP (!) 140/69   Pulse 95   Temp 98.1 °F (36.7 °C) (Oral)   Resp 15   Ht 5' 8.11\" (1.73 m)   Wt 134 lb (60.8 kg)   SpO2 98%   BMI 20.31 kg/m²  I Body mass index is 20.31 kg/m². I   Wt Readings from Last 1 Encounters:   19 134 lb (60.8 kg)      Temp (24hrs), Av °F (36.7 °C), Min:97.9 °F (36.6 °C), Max:98.1 °F (36.7 °C)         GEN: well developed, well nourished, no acute distress  HEENT: Normocephalic atraumatic, EOMI, mucous membranes pink and moist  CV: RRR, no murmurs, rubs or gallops  PULM: CTAB, no rales or rhonchi. Respirations WNL and unlabored  ABD: soft, NT, ND, +BS and equal  NEURO: A&O x3. Sensation intact to light touch. MSK: 4/5 upper extremities, 4/5 proximal lower extremities, left lower extremity dorsiflexion 0/5, plantarflexion 3/5, right lower extremity plantar and dorsiflexion 0/5  EXTREMITIES: No calf tenderness to palpation bilaterally. No edema BLEs  SKIN: warm dry and intact with good turgor, sternal incision clean, lower extremity graft incision clean and intact, left heel blackened, right medial heel blackened though heels are intact, is sacrum stage II ulcer clean, Right Ant calf incision clean though 1 x 1 cm wound  With packing, L leg wounds clean, R Forearm clean depth, appears clean-no changes and no signs or symptoms of infection, right lower extremity wound evaluated again today with nursing-no change, no drainage, decreased erythema , no tenderness  PSYCH: appropriately interactive. Affect WNL.   Good spirits        Medications   Scheduled Meds:   lisinopril  2.5 mg Oral Daily    furosemide  20 mg Oral Daily    metoprolol tartrate  25 mg Oral BID    insulin glargine  35 Units Subcutaneous Daily    traZODone  50 mg Oral Nightly    QUEtiapine  25 mg Oral Nightly    enoxaparin  40 mg Subcutaneous Daily    atorvastatin  40 mg Oral Nightly    clopidogrel  75 mg Oral Daily    famotidine  20 mg Oral BID    therapeutic multivitamin-minerals  1 tablet Oral Daily with breakfast    aspirin  81 mg Oral Daily    docusate  100 mg Oral Daily    doxycycline monohydrate  100 mg Oral 2 times per day    insulin lispro  0-12 Units Subcutaneous TID     insulin lispro  0-6 Units Subcutaneous Nightly    docusate sodium  100 mg Oral BID    polyethylene glycol  17 g Oral Daily     Continuous Infusions:   dextrose       PRN Meds:.oxyCODONE **OR** [DISCONTINUED] oxyCODONE, dextrose, dextrose, glucagon (rDNA), glucose, magnesium hydroxide, albuterol, bisacodyl     Diagnostics:     CBC:   Recent Labs     04/29/19  0627   WBC 12.1*   RBC 3.67*   HGB 11.2*   HCT 33.7*   MCV 91.9   RDW 14.8   *     BMP:   Recent Labs     04/29/19  0627 04/30/19  0621   * 135   K 4.8 4.2   CL 92* 96*   CO2 26 27   BUN 20 12   CREATININE 0.55* 0.42*     BNP: No results for input(s): BNP in the last 72 hours. PT/INR:   No results for input(s): PROTIME, INR in the last 72 hours. APTT: No results for input(s): APTT in the last 72 hours. CARDIAC ENZYMES: No results for input(s): CKMB, CKMBINDEX, TROPONINT in the last 72 hours. Invalid input(s): CKTOTAL;3  FASTING LIPID PANEL:  Lab Results   Component Value Date    CHOL 56 04/17/2019    HDL 13 (L) 04/17/2019    TRIG 107 04/17/2019     LIVER PROFILE:   No results for input(s): AST, ALT, ALB, BILIDIR, BILITOT, ALKPHOS in the last 72 hours. I/O (24Hr): Intake/Output Summary (Last 24 hours) at 5/1/2019 1433  Last data filed at 5/1/2019 0504  Gross per 24 hour   Intake --   Output 1600 ml   Net -1600 ml       Glu last 24 hour  Recent Labs     04/30/19  1546 04/30/19  2002 05/01/19  0643 05/01/19  1203   POCGLU 204* 355* 131* 261*       No results for input(s): CLARITYU, COLORU, PHUR, SPECGRAV, PROTEINU, RBCUA, BLOODU, BACTERIA, NITRU, WBCUA, LEUKOCYTESUR, YEAST, GLUCOSEU, BILIRUBINUR in the last 72 hours. Other Diagnoses/plan:    1.  Ambulatory and ADL dysfunction secondary to cardiac arrest, CABG, CVA, infected right lower extremity hardware, metabolic encephalopathy and respiratory failure-continue rehab efforts , team conference reviewed, discharge plan 5/8-reviewed with patient, agreeable  2. Status PTA arrest, emergent cardiac cath, coronary artery disease, myopathy, CABG ×4 on 4/7, BP- , metoprolol,  appreciate cardiology follow-up, lisinopril and Lasix resumed   3. Status post respiratory arrest, vent, oxygen, extubated 4/19/19-Proventil,  O2 as needed  4. RLE hardware removal /22/19 , chronic leg osteomyelitis, right foot drop-ankle foot orthoses, weightbearing as tolerated, doxycycline  60 days, leukocytosis trending down, continue dressing and packing-wound care follow-no erythema or drainage-appears to be improved follow-up with Dr. Alin Nicholson next Tuesday, ? take pitcher- called  the office-wants follow-up and t, hopefully off tele-sitter by then otherwise will need someonel with him  5. miild hyponatremia-improved, monitor  6. Thrombocytosis-monitor, improving  7. Anemia-improving-monitor improving  8. Diabetes-insulin, sliding scale and glucose as above  9. multiple infarct/?right ICA stenting-aspirin and Plavix, Lipitor follow up with Dr. Thalia Quick 5/21-decision on stenting at that time  10. Encephalopathy-slept better with trazodone, dc seroquel -   11. GI-Ischemic hepatitis more also on Pepcid on Colace, MiraLAX , LFTs-continue to improve  12. S/p  acute kidney injury improved-resolved  13. Pain- oxycodone   14. Multiple  wounds, blackened healed, sacral stage II ulcer, leg, arrm-wound care, PRAFO b/l, elevated heels decrease pressure to sacrum-wound care follow =-no signs or symptoms fraction, continues on doxycycline  15. DVTprophylaxis-Lovenox          Ab Yeboah. Sky Ferrer MD       This note is created with the assistance of a speech recognition program.  While intending to generate a document that actually reflects the content of the visit, the document can still have some errors including those of syntax and sound a like substitutions which may escape proof reading.   In such instances, actual meaning can be extrapolated by contextual diversion

## 2019-05-02 DIAGNOSIS — Z95.1 S/P CABG X 4: Primary | ICD-10-CM

## 2019-05-02 LAB
GLUCOSE BLD-MCNC: 114 MG/DL (ref 75–110)
GLUCOSE BLD-MCNC: 125 MG/DL (ref 75–110)
GLUCOSE BLD-MCNC: 155 MG/DL (ref 75–110)
GLUCOSE BLD-MCNC: 166 MG/DL (ref 75–110)

## 2019-05-02 PROCEDURE — 97535 SELF CARE MNGMENT TRAINING: CPT

## 2019-05-02 PROCEDURE — 97150 GROUP THERAPEUTIC PROCEDURES: CPT

## 2019-05-02 PROCEDURE — 99254 IP/OBS CNSLTJ NEW/EST MOD 60: CPT | Performed by: INTERNAL MEDICINE

## 2019-05-02 PROCEDURE — 6370000000 HC RX 637 (ALT 250 FOR IP): Performed by: INTERNAL MEDICINE

## 2019-05-02 PROCEDURE — 6370000000 HC RX 637 (ALT 250 FOR IP): Performed by: PHYSICAL MEDICINE & REHABILITATION

## 2019-05-02 PROCEDURE — 82947 ASSAY GLUCOSE BLOOD QUANT: CPT

## 2019-05-02 PROCEDURE — 97127 HC SP THER IVNTJ W/FOCUS COG FUNCJ: CPT

## 2019-05-02 PROCEDURE — 6370000000 HC RX 637 (ALT 250 FOR IP): Performed by: NURSE PRACTITIONER

## 2019-05-02 PROCEDURE — 97530 THERAPEUTIC ACTIVITIES: CPT

## 2019-05-02 PROCEDURE — 93970 EXTREMITY STUDY: CPT

## 2019-05-02 PROCEDURE — 99231 SBSQ HOSP IP/OBS SF/LOW 25: CPT | Performed by: INTERNAL MEDICINE

## 2019-05-02 PROCEDURE — 97116 GAIT TRAINING THERAPY: CPT

## 2019-05-02 PROCEDURE — 6370000000 HC RX 637 (ALT 250 FOR IP): Performed by: STUDENT IN AN ORGANIZED HEALTH CARE EDUCATION/TRAINING PROGRAM

## 2019-05-02 PROCEDURE — 97110 THERAPEUTIC EXERCISES: CPT

## 2019-05-02 PROCEDURE — 6360000002 HC RX W HCPCS: Performed by: NURSE PRACTITIONER

## 2019-05-02 PROCEDURE — 1180000000 HC REHAB R&B

## 2019-05-02 RX ORDER — CEPHALEXIN 500 MG/1
500 CAPSULE ORAL EVERY 6 HOURS SCHEDULED
Status: DISCONTINUED | OUTPATIENT
Start: 2019-05-02 | End: 2019-05-09 | Stop reason: HOSPADM

## 2019-05-02 RX ADMIN — MULTIPLE VITAMINS W/ MINERALS TAB 1 TABLET: TAB at 08:07

## 2019-05-02 RX ADMIN — ATORVASTATIN CALCIUM 40 MG: 40 TABLET, FILM COATED ORAL at 20:59

## 2019-05-02 RX ADMIN — INSULIN LISPRO 2 UNITS: 100 INJECTION, SOLUTION INTRAVENOUS; SUBCUTANEOUS at 16:10

## 2019-05-02 RX ADMIN — OXYCODONE HYDROCHLORIDE 5 MG: 5 TABLET ORAL at 08:07

## 2019-05-02 RX ADMIN — ASPIRIN 81 MG 81 MG: 81 TABLET ORAL at 08:07

## 2019-05-02 RX ADMIN — DOXYCYCLINE 100 MG: 100 CAPSULE ORAL at 20:59

## 2019-05-02 RX ADMIN — INSULIN LISPRO 2 UNITS: 100 INJECTION, SOLUTION INTRAVENOUS; SUBCUTANEOUS at 12:03

## 2019-05-02 RX ADMIN — FAMOTIDINE 20 MG: 20 TABLET ORAL at 08:07

## 2019-05-02 RX ADMIN — METOPROLOL TARTRATE 25 MG: 25 TABLET ORAL at 08:06

## 2019-05-02 RX ADMIN — DOCUSATE SODIUM 100 MG: 100 CAPSULE, LIQUID FILLED ORAL at 20:59

## 2019-05-02 RX ADMIN — FUROSEMIDE 20 MG: 20 TABLET ORAL at 08:07

## 2019-05-02 RX ADMIN — CLOPIDOGREL BISULFATE 75 MG: 75 TABLET ORAL at 08:06

## 2019-05-02 RX ADMIN — INSULIN GLARGINE 35 UNITS: 100 INJECTION, SOLUTION SUBCUTANEOUS at 08:03

## 2019-05-02 RX ADMIN — METOPROLOL TARTRATE 25 MG: 25 TABLET ORAL at 20:59

## 2019-05-02 RX ADMIN — LISINOPRIL 2.5 MG: 5 TABLET ORAL at 08:07

## 2019-05-02 RX ADMIN — DOCUSATE SODIUM 100 MG: 50 LIQUID ORAL at 08:07

## 2019-05-02 RX ADMIN — FAMOTIDINE 20 MG: 20 TABLET ORAL at 20:59

## 2019-05-02 RX ADMIN — POLYETHYLENE GLYCOL 3350 17 G: 17 POWDER, FOR SOLUTION ORAL at 08:06

## 2019-05-02 RX ADMIN — ENOXAPARIN SODIUM 40 MG: 100 INJECTION SUBCUTANEOUS at 08:06

## 2019-05-02 RX ADMIN — TRAZODONE HYDROCHLORIDE 50 MG: 50 TABLET ORAL at 20:59

## 2019-05-02 RX ADMIN — CEPHALEXIN 500 MG: 500 CAPSULE ORAL at 19:12

## 2019-05-02 RX ADMIN — DOCUSATE SODIUM 100 MG: 100 CAPSULE, LIQUID FILLED ORAL at 08:07

## 2019-05-02 RX ADMIN — DOXYCYCLINE 100 MG: 100 CAPSULE ORAL at 08:06

## 2019-05-02 ASSESSMENT — PAIN DESCRIPTION - LOCATION: LOCATION: LEG

## 2019-05-02 ASSESSMENT — PAIN SCALES - GENERAL
PAINLEVEL_OUTOF10: 3
PAINLEVEL_OUTOF10: 4
PAINLEVEL_OUTOF10: 5

## 2019-05-02 ASSESSMENT — PAIN DESCRIPTION - ORIENTATION: ORIENTATION: RIGHT

## 2019-05-02 ASSESSMENT — PAIN DESCRIPTION - PAIN TYPE: TYPE: ACUTE PAIN

## 2019-05-02 NOTE — CONSULTS
Infectious disease Consult Note      Patient: Gustavo Conley  : 1965  Acct#:  [id number]     Date:  2019    Subjective:       History of Present Illness  Patient is a 46 y.o.  male admitted to  [place] [facility] S/P hospitalization at [facility] for Cardiac arrest  Acute CVA (cerebrovascular accident)  who is seen in consult for increase right calf swelling and redness . He was admitted initially to [facility] on 2019 with increased shortness of breath and found to have PEA arrest,had emergent cardiac cath which revealed multivessel coronary artery disease with severe LV dysfunction followed by  CABG ×4 on 19 with delayed closure of chest wall on 19. His hospital course was complicated by acute renal failure . CT brain was suggestive of infarct. He had history of right proximal tibia fracture status post ORIF apparently he was not compliant with follow-up and ended up with a hardware protruding through the skin. The hardware was removed on 2019 STAPHYLOCOCCUS SPECIES, COAGULASE NEGATIVE SCANT GROWTH on tissue cultures. He was placed on oral doxycycline chronically for suspected infected hardware and chronic osteomyelitis. Interval history:  He developed redness and swelling to the right calf area for 2 days. Symptoms associated with pain. Symptoms are aggravated by movement. Symptoms are alleviated by nothing. He denied any fever or chills, denied nausea vomiting or diarrhea.     Past Medical History:   Diagnosis Date    CAD (coronary artery disease) 2019    Cardiac arrest ([facility] [address] 2019    Osteolysis 2019    R Knee hardware    PEA (Pulseless electrical activity) ([facility] [address] 2019    PVD (peripheral vascular disease) ([facility] [address] 2019      Past Surgical History:   Procedure Laterality Date    CARDIAC CATHETERIZATION  2019    MVD-Stv    CORONARY ARTERY BYPASS GRAFT  04/07/2019    x4 per Dr. Penelope Siddiqui, [address] CORONARY ARTERY BYPASS GRAFT N/A 4/7/2019    EMERGENT CPR,  CORONARY ARTERY BYPASS X4, ON PUMP ,CHEST LEFT OPEN; SWAN, ANGELA PER ANESTHESIA performed by Jacinto Hopper MD at [address] [facility]      unsure if correct known tibial surgery    HARDWARE REMOVAL Right 04/07/2019    R Knee internal screw protruding through skin    HARDWARE REMOVAL Right 04/22/2019    HARDWARE REMOVAL RIGHT TIBIA,    RECONSTRUCTIVE REPAIR STERNAL N/A 4/8/2019    STERNAL  WOUND WASHOUT AND CLOSURE S/P CABG performed by Jacinto Hopper MD at [address] [facility] N/A 4/22/2019    HARDWARE REMOVAL RIGHT TIBIA, IRRIGATION AND DEBRIDEMENT [address] performed by Blanca Nolasco MD at [address] [facility] Right     Leopoldo Anne off roof, external fixation and internal hardware    TONSILLECTOMY            Admission Meds  No current facility-administered medications on file prior to encounter. No current outpatient medications on file prior to encounter. Allergies  No Known Allergies     Social   Social History     Tobacco Use    Smoking status: Former Smoker   Substance Use Topics    Alcohol use: Not on file                No family history on file. Review of Systems  No fever / chills / sweats. No weight loss. No visual change, eye pain, eye discharge. No oral lesion, sore throat, dysphagia. Denies cough / sputum. Denies chest pain, palpitations. Denies n / v / abd pain. No diarrhea. Denies dysuria or change in urinary function. Other than above 14 systems reviewed were negative . Tolerating antibiotics.          Physical Exam  BP (!) 102/50   Pulse 95   Temp 97.9 °F (36.6 °C) (Oral)   Resp 18   Ht 5' 8.11\" (1.73 m)   Wt 134 lb (60.8 kg)   SpO2 100%   BMI 20.31 kg/m²           General Appearance: alert and oriented to person, place and time, well-developed and well-nourished, in no acute distress  Skin: warm and dry, no rash   Head: normocephalic and atraumatic  Eyes: pupils equal, round, and reactive to light, extraocular eye movements intact, conjunctivae normal  ENT: hearing grossly normal bilaterally  Neck: neck supple and non tender without mass  Pulmonary/Chest: clear to auscultation bilaterally- no wheezes, rales or rhonchi, normal air movement, no respiratory distress  Cardiovascular: normal rate, regular rhythm, normal S1 and S2, no murmurs. Abdomen: soft, non-tender, non-distended, normal bowel sounds, no masses or organomegaly  Extremities: no cyanosis, clubbing  Right calf area of erythema, induration and tenderness, no fluctuation or crepitations. Surgical incision below her right knee with mild surrounding erythema, no purulent drainage no crepitance no fluctuation. Musculoskeletal: normal range of motion, no joint swelling, deformity or tenderness      Data Review:    No results for input(s): WBC, HGB, HCT, MCV, PLT in the last 72 hours. Recent Labs     04/30/19  0621      K 4.2   CL 96*   CO2 27   BUN 12   CREATININE 0.42*     No results for input(s): AST, ALT, ALB, BILIDIR, BILITOT, ALKPHOS in the last 72 hours. No results for input(s): LIPASE, AMYLASE in the last 72 hours. No results for input(s): PROTIME, INR in the last 72 hours. No results for input(s): PTT in the last 72 hours. No results for input(s): OCCULTBLD in the last 72 hours. No results for input(s): GLUMET in the last 72 hours. Imaging Studies:                           All appropriate imaging studies and reports reviewed: Yes       Xr Femur Right (min 2 Views)    Result Date: 4/7/2019  EXAMINATION: 2 XRAY VIEWS OF THE RIGHT FEMUR 4/7/2019 3:28 pm COMPARISON: None. HISTORY: ORDERING SYSTEM PROVIDED HISTORY: Trauma/Fracture TECHNOLOGIST PROVIDED HISTORY: Trauma/Fracture FINDINGS: The femur appears intact. The hip and knee are in anatomic alignment. Hardware is noted in the proximal tibia. Soft tissues are unremarkable.      No fracture     Xr There is no evidence of acute infarct or hemorrhage. Previously suspected left parietal hypodensity is better seen on the current study to represent partial volume averaging of a sulcus. Bilateral anterior frontal and high left frontoparietal encephalomalacia is re- demonstrated. There is no hydrocephalus or extra-axial fluid collection. Midline is maintained. Basal cisterns are patent. ORBITS: The visualized portion of the orbits demonstrate no acute abnormality. SINUSES: Diffuse paranasal sinus mucoperiosteal thickening. Trace bilateral mastoid effusions. These findings may relate to intubation. SOFT TISSUES/SKULL:  No acute abnormality of the visualized skull or soft tissues. 1. No acute intracranial abnormality. 2. Stable multifocal encephalomalacia. Ct Head Wo Contrast    Result Date: 4/12/2019  EXAMINATION: CT OF THE HEAD WITHOUT CONTRAST  4/12/2019 10:25 am TECHNIQUE: CT of the head was performed without the administration of intravenous contrast. Dose modulation, iterative reconstruction, and/or weight based adjustment of the mA/kV was utilized to reduce the radiation dose to as low as reasonably achievable. COMPARISON: None. HISTORY: ORDERING SYSTEM PROVIDED HISTORY: NEURO DEFICIT(S), SUBACUTE, PROGRESSIVE OR FLUCTUATING TECHNOLOGIST PROVIDED HISTORY: Ordering Physician Provided Reason for Exam: History of Present Illness: This is a 46 y.o. male with unknown medical history admitted for management of cardiac arrest and STEMI. Patient initially called EMS for worsening SOB, placed on CPAP, given breathing treatments, solumedrol, aspirin, transport initiated to [facility]. EKG done by EMS showed possible anterior septal STEMI. On arrival to [facility] ED, pulses were lost. ACLS protocol initiated, CPR initiated patient intubated. EKG showed PEA. Three rounds CPR with epi given, had return of pulses. Bedside ECHO at that time showed globally decreased systolic contraction. Cardiology consulted, agreed for cath. Cath done showed multivessel CAD with severe LV systolic dysfunction EF 79% with severe hypokinesis of ant and mid inferior wall and apical akinesis. Right iliac a also noted to be occluded. FINDINGS: BRAIN/VENTRICLES: There is no acute intracranial hemorrhage, mass effect or midline shift. No abnormal extra-axial fluid collection. The gray-white differentiation is maintained without evidence of an acute infarct. There is no evidence of hydrocephalus. Encephalomalacia is present in the left frontal lobe consistent with remote infarct. Punctate density is noted along the encephalomalacia which may represent an area of dystrophic calcification. An additional area of encephalomalacia is noted in the high left parietal lobe. An area of encephalomalacia is also present in the right frontal lobe, and there is hypodensity in the left posterior parietal lobe which may represent subacute to chronic infarct. Scattered hypodensities are present in the white matter consistent with chronic microvascular change. Vertebral and internal carotid arteries are calcified. Small extradural calcification is noted near the vertex, right side. This may represent a small meningioma without mass effect of 8 mm. ORBITS: The visualized portion of the orbits demonstrate no acute abnormality. SINUSES: Moderate mucoperiosteal thickening is noted in the maxillary and sphenoid sinuses. Frontal sinuses are hypoplastic. Significant mucoperiosteal thickening is noted in the ethmoid air cells. Mastoid air cells are appropriately aerated. SOFT TISSUES/SKULL:  No acute abnormality of the visualized skull or soft tissues. Estimated biologic radiation dose for this procedure:1059.07 mGy/cm2. Scattered areas of encephalomalacia, bifrontal and high left parietal areas consistent with remote infarct. There is hypodensity in the left lower parietal region possibly a subacute infarct. Chronic microvascular changes are noted.   A punctate ultrasound. Xr Chest Portable    Result Date: 4/19/2019  EXAMINATION: SINGLE XRAY VIEW OF THE CHEST 4/19/2019 6:38 am COMPARISON: April 17, 2019 HISTORY: ORDERING SYSTEM PROVIDED HISTORY: intubated TECHNOLOGIST PROVIDED HISTORY: intubated FINDINGS: Endotracheal tube with the tip just below the level of the clavicles. Enteric tube with the tip within the stomach. Partial clearing at the right lung base. Persistent left lung base opacities. Sternotomy. Cardiomegaly. Skin staples. No definite pulmonary edema. Partial clearing at the right lung base. Persistent left lung base opacity. Xr Chest Portable    Result Date: 4/17/2019  EXAMINATION: SINGLE XRAY VIEW OF THE CHEST 4/17/2019 10:13 am COMPARISON: April 15, 2019 HISTORY: ORDERING SYSTEM PROVIDED HISTORY: possible pneumonia TECHNOLOGIST PROVIDED HISTORY: possible pneumonia Ordering Physician Provided Reason for Exam: possible pneumonia. upright portable. FINDINGS: ET tube in good position. Enteric tube passes beneath the diaphragm. Intact sternotomy wires. Cardiac leads overlie the chest.  Enlarged heart with moderate pulmonary edema. Bilateral pleural effusions. No pneumothorax. Stable moderate pulmonary edema and effusions. Stable supporting devices. Xr Chest Portable    Result Date: 4/15/2019  EXAMINATION: SINGLE XRAY VIEW OF THE CHEST 4/15/2019 6:15 am COMPARISON: 04/14/2019, 531 hours HISTORY: ORDERING SYSTEM PROVIDED HISTORY: intubated, s/p [facility] TECHNOLOGIST PROVIDED HISTORY: intubated, s/p [facility] 79-year-old male who is intubated status post open heart surgery FINDINGS: Portable supine view of the chest. Endotracheal tube distal tip overlying the mid trachea approximately 3.6 cm above the level of the ramu. Enteric tube traverses the GE junction with distal tip projecting over the medial left upper quadrant likely within the fundus of the stomach. Midline skin staples project over the chest.  Prior median sternotomy and CABG. no pneumothorax. The heart is prominent. The upper abdomen is unremarkable. The extrathoracic soft tissues are unremarkable. There is an endotracheal tube with the tip in the midtrachea. There is a gastric tube with the tip in the proximal aspect of the stomach. The right internal jugular line has been removed. The [place]-Carly catheter has been removed. Cardiomegaly and bilateral pulmonary congestion/edema that is similar compared to prior. Bibasilar effusions and stable endotracheal tube and gastric tube. Xr Chest Portable    Result Date: 4/9/2019  EXAMINATION: SINGLE XRAY VIEW OF THE CHEST 4/9/2019 5:30 am COMPARISON: 04/08/2019 HISTORY: ORDERING SYSTEM PROVIDED HISTORY: post op TECHNOLOGIST PROVIDED HISTORY: post op FINDINGS: Endotracheal tube terminates 3.3 cm above the ramu. [place]arly catheter terminates overlying the pulmonary outflow tract. Multiple mediastinal drainage tube projects over the cardiac silhouette. Central vascular congestion and diffuse interstitial opacities, stable or slightly worsened. Small left pleural effusion suspected. Pulmonary vascular congestion and interstitial opacities, stable or slightly worse on current examination. Support tubes and lines as above, in grossly unchanged positioning. Xr Chest Portable    Result Date: 4/8/2019  EXAMINATION: SINGLE XRAY VIEW OF THE CHEST 4/8/2019 2:09 pm COMPARISON: 4/8/2019 HISTORY: ORDERING SYSTEM PROVIDED HISTORY: post op chest closure TECHNOLOGIST PROVIDED HISTORY: post op chest closure FINDINGS: Intact median sternotomy wires. Midline skin staples. ET tube in good position. Mediastinal drain present. Enteric tube within the expected location of the stomach. [place]-Carly catheter within the right main pulmonary artery as demonstrated previously. Stable cardiomegaly. Asymmetric pulmonary edema right greater than left, similar to prior. Small left effusion. No pneumothorax. Stable retrocardiac atelectasis. Similar bilateral asymmetric pulmonary edema right greater than left. Xr Chest Portable    Result Date: 4/8/2019  EXAMINATION: SINGLE XRAY VIEW OF THE CHEST 4/8/2019 5:06 am COMPARISON: Chest x-ray 04/07/2019 HISTORY: ORDERING SYSTEM PROVIDED HISTORY: post op TECHNOLOGIST PROVIDED HISTORY: post op FINDINGS: Endotracheal tube tip remains above the ramu. Left subclavian approach pulmonary artery catheter with the tip over the right main pulmonary artery. Mediastinal drains and left chest tube. No pneumothorax. There continue to be bilateral perihilar opacities, not significantly changed. Increasing left basilar opacity. Cardiac silhouette is unremarkable. 1.  Persistent perihilar opacities could be due to pulmonary edema. 2.  Increased left basilar opacity is likely atelectasis. Xr Chest Portable    Result Date: 4/7/2019  EXAMINATION: SINGLE XRAY VIEW OF THE CHEST 4/7/2019 12:18 pm COMPARISON: 4/7/2019 HISTORY: ORDERING SYSTEM PROVIDED HISTORY: Post op open heart surgery TECHNOLOGIST PROVIDED HISTORY: Post op open heart surgery FINDINGS: [place]-Carly catheter directed into the right main pulmonary artery, new since the prior exam.  Mediastinal drain, NG tube, and ET tube are present. Heart is enlarged. Moderate to severe pulmonary edema, slightly improved from prior. 1. Slight improvement in pulmonary edema with interval placement of [place]-Carly catheter. 2. No pneumothorax. 3. Supporting devices are otherwise stable. Xr Chest Portable    Result Date: 4/7/2019  EXAMINATION: SINGLE XRAY VIEW OF THE CHEST 4/7/2019 5:14 am COMPARISON: None. HISTORY: ORDERING SYSTEM PROVIDED HISTORY: cardiac arrest TECHNOLOGIST PROVIDED HISTORY: cardiac arrest FINDINGS: Diffuse patchy bilateral airspace opacities. Endotracheal tube tip is about 5 cm above the ramu. Gastric tube tip projects over the proximal stomach. Costophrenic angles are clear. Cardiac and mediastinal structures are unremarkable. Diffuse patchy bilateral airspace opacities could be due to edema or multifocal pneumonia. Cta Neck W Contrast    Result Date: 4/18/2019  EXAMINATION: CTA OF THE HEAD WITH CONTRAST; CTA OF THE NECK 4/18/2019 5:31 pm: TECHNIQUE: CTA of the head/brain was performed with the administration of intravenous contrast. Multiplanar reformatted images are provided for review. MIP images are provided for review. Dose modulation, iterative reconstruction, and/or weight based adjustment of the mA/kV was utilized to reduce the radiation dose to as low as reasonably achievable.; CTA of the neck was performed with the administration of intravenous contrast. Multiplanar reformatted images are provided for review. MIP images are provided for review. Stenosis of the internal carotid arteries measured using NASCET criteria. Dose modulation, iterative reconstruction, and/or weight based adjustment of the mA/kV was utilized to reduce the radiation dose to as low as reasonably achievable. 3D reconstructed images were performed on a separate workstation and provided for review. COMPARISON: CT head 04/12/2019 HISTORY: ORDERING SYSTEM PROVIDED HISTORY: STROKE TECHNOLOGIST PROVIDED HISTORY: Ordering Physician Provided Reason for Exam: STROKE S/P CABG Additional signs and symptoms: PT INTUBATED FINDINGS: CTA NECK: AORTIC ARCH/ARCH VESSELS: Moderate plaque identified involving the aortic arch is visualized. 3 vessel configuration of the aortic arch. Moderate severe stenosis identified involving the left common carotid artery origin and proximal common carotid artery and involving the left iliac artery. Motion artifact through the ostia great vessels challenge evaluation. There is 60-70% stenosis identified involving the proximal left common carotid artery and left subclavian artery. Mild-to-moderate focal stenosis identified involving the ostium of the right brachiocephalic artery measuring 30%.  CAROTID ARTERIES: Left common carotid artery demonstrates moderate diffuse atherosclerotic disease with focal stenosis involving the left ostium measuring 60-70%. Right common carotid artery noted is mild diffuse atherosclerotic disease without hemodynamic stenosis or occlusion. . Right internal carotid artery demonstrates moderate diffuse atherosclerotic disease without up 60% stenosis per NASCET criteria. Left internal carotid artery is occluded at its origin sent to level skull base. No dissection or arterial injury is seen on the right. . 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 Right vertebral artery demonstrates mild stenosis at this ostium but otherwise grossly patent. Remona Mellow SOFT TISSUES: Large bilateral pleural fluid collections. Bibasilar airspace disease and atelectasis. Chronic emphysematous changes. .  No cervical or superior mediastinal lymphadenopathy. The visualized portion of the larynx and pharynx appear unremarkable. The parotid, submandibular and thyroid glands demonstrate no acute abnormality. BONES: The visualized osseous structures demonstrate multilevel degenerative changes. . CTA HEAD: ANTERIOR CIRCULATION: Right petrous segment, cavernous segments cervical segments are patent. Left petrous, cavernous segments are occluded. Reconstitution left carotid terminus likely due to combination of extracranial and intracranial collateral flow. The anterior cerebral and middle cerebral arteries demonstrate no focal stenosis. POSTERIOR CIRCULATION: Bilateral P comms. The posterior cerebral arteries demonstrate no focal stenosis. The vertebral and basilar arteries appear unremarkable. BRAIN: No mass effect or midline shift. No hyperdense extra-axial fluid collection. Chronic encephalomalacia left TOAN distribution involving both inferior frontal lobes.   The gray-white differentiation appears grossly focal stenosis. The vertebral and basilar arteries appear unremarkable. BRAIN: No mass effect or midline shift. No hyperdense extra-axial fluid collection. Chronic encephalomalacia left TOAN distribution involving both inferior frontal lobes. The gray-white differentiation appears grossly maintained. Air-fluid levels both sphenoid sinuses and posterior ethmoid air cells. Occlusion left ICA vertebral artery  extending to level skull base. Moderate atherosclerotic changes identified involving the remainder of the cervical arterial vasculature. Reconstitution left carotid terminus. Otherwise no hemodynamic stenosis or occlusion identified involving intracranial circulation . Mri Brain Wo Contrast    Result Date: 4/14/2019  EXAMINATION: MRI OF THE BRAIN WITHOUT CONTRAST  4/14/2019 8:40 pm TECHNIQUE: Multiplanar multisequence MRI of the brain was performed without the administration of intravenous contrast. COMPARISON: CT head earlier today. HISTORY: ORDERING SYSTEM PROVIDED HISTORY: DECREASED ALERTNESS Initial encounter. FINDINGS: INTRACRANIAL STRUCTURES/VENTRICLES: There are small foci of diffusion restriction in the right brachium pontis, right cerebellar hemisphere, posterior left and right frontal centrum semiovale white matter, and the posterior right frontal corona radiata white matter. Bilateral anterior frontal and high left frontoparietal encephalomalacia is noted with surrounding gliosis. No mass effect or midline shift. No evidence of an acute intracranial hemorrhage. The ventricles and sulci are normal in size and configuration. The sellar/suprasellar regions appear unremarkable. There is absent flow void in the visualized left internal carotid artery. ORBITS: Orbital structures are unremarkable. SINUSES: Dependent paranasal sinus mucoperiosteal thickening and layering fluid with bilateral mastoid effusions may relate to intubation.  BONES/SOFT TISSUES: The bone marrow signal intensity appears normal. The soft tissues demonstrate no acute abnormality. 1. Multiple acute ischemic infarcts in the right brachium pontis, right cerebellar hemisphere, and bilateral cerebral white matter as detailed above with distribution in multiple vascular territories suggesting central embolic etiology. 2. No hemorrhage or mass effect. 3. Bilateral anterior frontal and high left frontoparietal encephalomalacia consistent with sequela of prior insult. 4. Absent flow void in the left internal carotid artery consistent with high-grade proximal stenosis or occlusion. Assessment:     Right calf cellulitis . Chronic steomyelitis of right tibia /infected hardware that was removed on 4/22/2019. History of ORIF of the right proximal tibia, noncompliant with follow-up with reported protruding hardware through the skin. Acute renal failure resolved     Coronary artery disease /recent cardiac arrest status post emergent cath and CABG on 4/7/2019 with delayed the chest wall incision closure. Acute CVA (cerebrovascular accident)     Moderate malnutrition (HCC)      Recommendations:   Continue PO Doxycycline  for chronic suppression add  PO Keflex  for now. F follow CBC and renal function. Further recommendation as per hospital course. Thank you for allowing me to participate in the care of your patient. Please feel free to contact me with any questions or concerns.      Francoise Garcia MD

## 2019-05-02 NOTE — PROGRESS NOTES
[facility]    Progress Note    5/2/2019    12:55 PM    Name:   Mychal Beach  MRN:     [id number]     Acct:      [de-identified]   Room:   [address] [place] [address] Day:  7  Admit Date:  4/25/2019  7:49 PM    PCP:   No primary care provider on file. Code Status:  Full Code    Subjective:     C/C: No chief complaint on file. Interval History Status: improved. HPI:   Patient had a cardiac arrest, found to have multivessel coronary artery disease, had CABG, patient had Stroke with Speech Difficulties   Infected Hard ware was Removed From Right leg     Review of Systems:     Constitutional:  negative for chills, fevers, sweats  Respiratory:  negative for cough, dyspnea on exertion, hemoptysis, shortness of breath, wheezing  Cardiovascular:  negative for chest pain, chest pressure/discomfort, lower extremity edema, palpitations  Gastrointestinal:  negative for abdominal pain, constipation, diarrhea, nausea, vomiting  Neurological:  Speech difficulties, foot drop present in right leg    Medications:      Allergies:  No Known Allergies    Current Meds:   Scheduled Meds:    lisinopril  2.5 mg Oral Daily    furosemide  20 mg Oral Daily    metoprolol tartrate  25 mg Oral BID    insulin glargine  35 Units Subcutaneous Daily    traZODone  50 mg Oral Nightly    enoxaparin  40 mg Subcutaneous Daily    atorvastatin  40 mg Oral Nightly    clopidogrel  75 mg Oral Daily    famotidine  20 mg Oral BID    therapeutic multivitamin-minerals  1 tablet Oral Daily with breakfast    aspirin  81 mg Oral Daily    docusate  100 mg Oral Daily    doxycycline monohydrate  100 mg Oral 2 times per day    insulin lispro  0-12 Units Subcutaneous TID WC    insulin lispro  0-6 Units Subcutaneous Nightly    docusate sodium  100 mg Oral BID    polyethylene glycol  17 g Oral Daily     Continuous Infusions:    dextrose       PRN Meds: oxyCODONE **OR** [DISCONTINUED] oxyCODONE, dextrose, dextrose,

## 2019-05-02 NOTE — PLAN OF CARE
Problem: Falls - Risk of:  Goal: Will remain free from falls  Description  Will remain free from falls  Outcome: Met This Shift     No falls or injuries sustained at this time. Pt makes some attempts to get out of bed without nursing assistance. Call light within reach and pt. uses appropriately for assistance at times. Side rails up x 2. Nonskid footwear remains on. Bed in low and locked position. Hourly nursing rounds made. Pt. Alert and oriented, aware of limitations, and exhibits good safety judgement. Pt. uses assistive devices appropriately. Pt. understands individual fall risk factors.     Pt. reoriented to surroundings and reminded to use call light with each nurse/patient interaction.     Pt. room located close to nurse's station.      Bed alarm remains engaged throughout the shift as a precaution. Telesitter placed at bedside for added precaution         Problem: Risk for Impaired Skin Integrity  Goal: Tissue integrity - skin and mucous membranes  Description  Structural intactness and normal physiological function of skin and  mucous membranes. Outcome: Ongoing  Skin assessment completed this shift. Nutrition and Hydration status assessed with adequate intake. Yash Score as charted. SPR Overlay remains intact and inflated to patient's bed throughout the shift. Bilateral heels remain elevated on pillows throughout the shift. Bilateral Prafo boots in use when pt is in bed for Bilat heel wounds and deep tissue injury to Right heel. Patient able to reposition self for comfort and to prevent breakdown. Patient verbalizes understanding of pressure ulcer prevention measures. Skin integrity maintained. No new skin breakdown noted. Skin to high risk pressure areas including coccyx and heels are clear.     Shana / Incontinence care provided as needed throughout the shift. Aloe Vesta Moisture Barrier ointment applied to buttocks as a preventative measure.     Pt with multiple BLE incisions.  Midline incision is C,D,I with steri stripes, See Assessment flow sheet for all wounds. Pt is being seen by wound care. .        Problem: Pain:  Goal: Pain level will decrease  Description  Pain level will decrease  Outcome: Ongoing  Pain assessment and reassessment completed so far this shift. Pt. able to rest after the use of pain medication. Patient medicated with 1 Roxicodone for c/o pain around surgical incision. Pt. Repositions with assistance and per self for comfort. Nonverbal cues indicate pain relief. Pt. Rests quietly with eyes closed after pain medication administration. Respirations easy and unlabored.  Appears free from distress.

## 2019-05-02 NOTE — PROGRESS NOTES
[address]   ACUTE REHABILITATION  LUNCH GROUP NOTE    Date: 19  Patient Name: Joaquín Shabazz      Room: 5878/1858-65        MRN: [id number]    : 1965  (46 y.o.)  Gender: male   Referring Practitioner: Dr. Gwen Arias  Diagnosis: Cardiac Arrest and stroke    The Patient participated in the OT Meal Program at  in the [address] [place] [address] on this date. They were in group for 25 minutes     The patient  initiated conversation. They expressed  enjoyment in the setting and people. The patient's pain was monitored as they were  able to tolerate Lunch Group. Pain level was 0/10. No report or demo of pain. They were Independent to feed themselves.    FIM Score: Eatin - Feeds self with adaptive equipment/dentures and/or feeds self with modified diet and/or performs own tube feeding     19 1217   OT Group Minutes   Time In 1115   Time Out Aasa 46

## 2019-05-02 NOTE — PROGRESS NOTES
Physical Medicine & Rehabilitation  Progress Note    5/2/2019 3:25 PM     CC: Ambulatory and ADL dysfunction due to cardiac arrest, CABG, CVA, infected right lower extremity hardware, metabolic encephalopathy and respiratory failure    Subjective:   Feels well. No complaints. Continues with jwrn-wduviz-oyuxtpxfcgs decrease,  bowels and bladder okay. ROS:  Denies fevers, chills, sweats. No chest pain, palpitations, lightheadedness. Denies coughing, wheezing or shortness of breath. Denies abdominal pain, nausea, diarrhea or constipation. No new areas of joint pain. Denies new areas of numbness or weakness. Denies new anxiety or depression issues. No new skin problems. Rehabilitation:     PT:  Restrictions/Precautions: Cardiac, General Precautions, Surgical Protocols, Fall Risk(telesitter)  Sternal Precautions: Emergent CABG X4  Other position/activity restrictions: Fall Risk  Required Braces or Orthoses  Other: Heart Hugger Brace  Right Lower Extremity Brace: Ankle Foot Orthotics  RLE Brace Type: Profo boot and AFO  Left Lower Extremity Brace: Boot  LLE Brace Type: Profo boot   Transfers  Sit to Stand: Contact guard assistance  Stand to sit: Contact guard assistance  Bed to Chair: Stand by assistance  Stand Pivot Transfers: Contact guard assistance  Comment: Pt used RW. Pt needs verbal cueing for hand placement during transfers with little carry over. At times pt lose balance posteriorly  Ambulation 1  Surface: level tile  Device: Rolling Walker  Other Apparatus: (bilateral PRAFO BOOTs)  Assistance: Contact guard assistance  Quality of Gait: High steppage on right LE to clear floor with right foot drop  Distance: 125 ft x 2  Comments: No shoes today. No prafo boots. Transfers  Sit to Stand: Contact guard assistance  Stand to sit: Contact guard assistance  Bed to Chair: Stand by assistance  Stand Pivot Transfers: Contact guard assistance  Comment: Pt used RW.  Pt needs verbal cueing for hand placement during transfers with little carry over. At times pt lose balance posteriorly  Ambulation  Ambulation?: Yes  More Ambulation?: Yes  Ambulation 1  Surface: level tile  Device: Rolling Walker  Other Apparatus: (bilateral PRAFO BOOTs)  Assistance: Contact guard assistance  Quality of Gait: High steppage on right LE to clear floor with right foot drop  Distance: 125 ft x 2  Comments: No shoes today. No prafo boots. Surface: level tile  Ambulation 1  Surface: level tile  Device: Rolling Walker  Other Apparatus: (bilateral PRAFO BOOTs)  Assistance: Contact guard assistance  Quality of Gait: High steppage on right LE to clear floor with right foot drop  Distance: 125 ft x 2  Comments: No shoes today. No prafo boots. OT:  OT FIM:   Eatin - Feeds self with adaptive equipment/dentures and/or feeds self with modified diet and/or performs own tube feeding  Groomin - Requires setup/cues to do all tasks(standing sinkside. )  Bathin - Able to bathe all 10 areas with setup/sup/cues(A c set up, SBA )  Dressing-Upper: 5 - Requires setup/supervision/cues and/or requires assist with presthesis/brace only(A c donning cardiac vest)  Dressing-Lower: 5 - Requires setup/supervision/cues and/or staff applies TEDS/prosthesis/brace only(SBA for set up/completion )  Toiletin - Requires setup/supervision/cues  Toilet Transfer: 5 - Requires setup/supervision/cues  Primary Mode: Shower  Tub Transfer: 0 - Activity does not occur  Shower Transfer: 5 - Supervision, set-up, cues      ST:        Attention: sustained throughout     Recall: short paragraph recall- 90%, 5 word scrambled sentence- 88%. Picture detail recall- 75%, 85% c cues.         Organization: n/a     Problem Solving/Reasoning: 3 reasons for given situation- 90%.       Other:                    Objective:  BP (!) 102/50   Pulse 95   Temp 97.9 °F (36.6 °C) (Oral)   Resp 18   Ht 5' 8.11\" (1.73 m)   Wt 134 lb (60.8 kg)   SpO2 100%   BMI 20.31 kg/m²  I Body mass index is 20.31 kg/m². I   Wt Readings from Last 1 Encounters:   19 134 lb (60.8 kg)      Temp (24hrs), Av.7 °F (36.5 °C), Min:97.5 °F (36.4 °C), Max:97.9 °F (36.6 °C)         GEN: well developed, well nourished, no acute distress  HEENT: Normocephalic atraumatic, EOMI, mucous membranes pink and moist  CV: RRR, no murmurs, rubs or gallops  PULM: CTAB, no rales or rhonchi. Respirations WNL and unlabored  ABD: soft, NT, ND, +BS and equal  NEURO: A&O x3. Sensation intact to light touch. MSK: 4/5 upper extremities, 4/5 proximal lower extremities, left lower extremity dorsiflexion 0/5, plantarflexion 3/5, right lower extremity plantar and dorsiflexion 0/5  EXTREMITIES: plus/minus tenderness right posterior calf, negative left. No edema BLEs  SKIN: warm dry and intact with good turgor, sternal incision clean, lower extremity graft incision clean and intact, left heel blackened, right medial heel blackened though heels are intact, is sacrum stage II ulcer clean,   Right Ant calf incision clean though 1 x 1 cm wound  With packing, L leg wounds clean, R Forearm clean depth, appears clean-no changes and no signs or symptoms of infection, right lower extremity wound evaluated again today with nursing-no change, no drainage, mild erythema , no tenderness mild erythema posterior calf  PSYCH: appropriately interactive. Affect WNL.   Good spirits        Medications   Scheduled Meds:   cephALEXin  500 mg Oral 4 times per day    lisinopril  2.5 mg Oral Daily    furosemide  20 mg Oral Daily    metoprolol tartrate  25 mg Oral BID    insulin glargine  35 Units Subcutaneous Daily    traZODone  50 mg Oral Nightly    enoxaparin  40 mg Subcutaneous Daily    atorvastatin  40 mg Oral Nightly    clopidogrel  75 mg Oral Daily    famotidine  20 mg Oral BID    therapeutic multivitamin-minerals  1 tablet Oral Daily with breakfast    aspirin  81 mg Oral Daily    docusate  100 mg Oral Daily    doxycycline monohydrate  100 mg Oral 2 times per day    insulin lispro  0-12 Units Subcutaneous TID WC    insulin lispro  0-6 Units Subcutaneous Nightly    docusate sodium  100 mg Oral BID    polyethylene glycol  17 g Oral Daily     Continuous Infusions:   dextrose       PRN Meds:.oxyCODONE **OR** [DISCONTINUED] oxyCODONE, dextrose, dextrose, glucagon (rDNA), glucose, magnesium hydroxide, albuterol, bisacodyl     Diagnostics:     CBC:   No results for input(s): WBC, RBC, HGB, HCT, MCV, RDW, PLT in the last 72 hours. BMP:   Recent Labs     04/30/19  0621      K 4.2   CL 96*   CO2 27   BUN 12   CREATININE 0.42*     BNP: No results for input(s): BNP in the last 72 hours. PT/INR:   No results for input(s): PROTIME, INR in the last 72 hours. APTT: No results for input(s): APTT in the last 72 hours. CARDIAC ENZYMES: No results for input(s): CKMB, CKMBINDEX, TROPONINT in the last 72 hours. Invalid input(s): CKTOTAL;3  FASTING LIPID PANEL:  Lab Results   Component Value Date    CHOL 56 04/17/2019    HDL 13 (L) 04/17/2019    TRIG 107 04/17/2019     LIVER PROFILE:   No results for input(s): AST, ALT, ALB, BILIDIR, BILITOT, ALKPHOS in the last 72 hours. I/O (24Hr): Intake/Output Summary (Last 24 hours) at 5/2/2019 1525  Last data filed at 5/2/2019 0049  Gross per 24 hour   Intake --   Output 775 ml   Net -775 ml       Glu last 24 hour  Recent Labs     05/01/19  1547 05/01/19  1903 05/02/19  0624 05/02/19  1115   POCGLU 175* 221* 114* 166*       No results for input(s): CLARITYU, COLORU, PHUR, SPECGRAV, PROTEINU, RBCUA, BLOODU, BACTERIA, NITRU, WBCUA, LEUKOCYTESUR, YEAST, GLUCOSEU, BILIRUBINUR in the last 72 hours. Other Diagnoses/plan:    1.  Ambulatory and ADL dysfunction secondary to cardiac arrest, CABG, CVA, infected right lower extremity hardware, metabolic encephalopathy and respiratory failure-continue rehab efforts , team conference reviewed, discharge plan 5/8-reviewed with patient, agreeable  2. Status PTA arrest, emergent cardiac cath, coronary artery disease, myopathy, CABG ×4 on 4/7, BP- , metoprolol,  appreciate cardiology follow-up, lisinopril and Lasix resumed   3. Status post respiratory arrest, vent, oxygen, extubated 4/19/19-Proventil,  O2 as needed  4. RLE hardware removal /22/19 , chronic leg osteomyelitis, right foot drop-ankle foot orthoses, weightbearing as tolerated, doxycycline  60 days, leukocytosis trending down, continue dressing and packing-wound care follow-?  Mild erythema and mild erythema posterior calf-noted start of Keflex by ID,  ? follow-up with Dr. Jose Mullen  5/3 vs next week, picture of right leg wound done to Epic 5/2 ,hopefully off tele-sitter by then otherwise will need someone with him  5. miild hyponatremia-improved, monitor  6. Thrombocytosis-monitor, improving  7. Anemia-improving-monitor improving  8. Diabetes-insulin, sliding scale and glucose as above  9. multiple infarct/?right ICA stenting-aspirin and Plavix, Lipitor follow up with Dr. Red Pagan 5/21-decision on stenting at that time  10. Encephalopathy-slept better with trazodone, dc seroquel -   11. GI-Ischemic hepatitis more also on Pepcid on Colace, MiraLAX , LFTs-continue to improve  12. S/p  acute kidney injury improved-resolved  13. Pain- oxycodone   14. Multiple  wounds, blackened healed, sacral stage II ulcer, leg, arrm-wound care, PRAFO b/l, elevated heels decrease pressure to sacrum-wound care follow =-no signs or symptoms infection, continues on doxycycline and noted addition of Keflex  15. DVTprophylaxis-Lovenox          Yuridiadarien Umana. Mitch Jacome MD       This note is created with the assistance of a speech recognition program.  While intending to generate a document that actually reflects the content of the visit, the document can still have some errors including those of syntax and sound a like substitutions which may escape proof reading.   In such instances, actual meaning can be extrapolated by contextual diversion

## 2019-05-02 NOTE — PROGRESS NOTES
Speech Language Pathology  Speech Language Pathology  [facility]    Cognitive Treatment Note    Date: 5/2/2019  Patients Name: Silvina Easton  MRN: [id number]  Diagnosis:   Patient Active Problem List   Diagnosis Code    PAD (peripheral artery disease) ([facility] I73.9    Subacute osteomyelitis of right tibia ([facility] A67.052    Metabolic encephalopathy S34.20    Abnormal CT of the head R93.0    Hepatitis K75.9    Acute cerebral infarction associated with systemic hypoxia or ischemia I63.89    Multi infarct state I69.30    Carotid stenosis, bilateral I65.23    Right foot drop M21.371    Thrombocytosis (HCC) D47.3    Acute CVA (cerebrovascular accident) ([facility] I63.9    Moderate malnutrition ([facility] E44.0       Pain: 0/10    Cognitive Treatment    Treatment time: 6255-5615      Subjective: [x] Alert [x] Cooperative     [] Confused     [] Agitated    [] Lethargic      Objective/Assessment:  Attention: sustained throughout    Recall: short paragraph recall- 90%, 5 word scrambled sentence- 88%. Picture detail recall- 75%, 85% c cues. Organization: n/a    Problem Solving/Reasoning: 3 reasons for given situation- 90%. Other:     Plan:  [x] Continue ST services    [] Discharge from ST:      Discharge recommendations: [] Inpatient Rehab   [] [place]   [] Outpatient Therapy  [] Follow up at trauma clinic   [] Other:       Treatment completed by: Myriam Duque A.CCC/SLP

## 2019-05-02 NOTE — PROGRESS NOTES
[facility]  Acute Rehabilitation Physical Therapy Progress Note    Date: 19  Patient Name: Mychal Beach       Room: 4818/9243-35  MRN: [id number]   Account: [de-identified]   : 1965  (46 y.o.) Gender: male     Referring Practitioner: Alise Hammond MD  Diagnosis: Acute CVA, cardiac arrest, CABG  Past Medical History:  has a past medical history of CAD (coronary artery disease), Cardiac arrest ([facility] Osteolysis, PEA (Pulseless electrical activity) ([facility] and PVD (peripheral vascular disease) ([facility] Past Surgical History:   has a past surgical history that includes Cardiac catheterization (2019); Femur Surgery; Tonsillectomy; Coronary artery bypass graft (2019); Hardware Removal (Right, 2019); Tibia fracture surgery (Right); Coronary artery bypass graft (N/A, 2019); RECONSTRUCTIVE REPAIR STERNAL (N/A, 2019); Hardware Removal (Right, 2019); and REMOVE HARDWARE FEMUR (N/A, 2019). Additional Pertinent Hx: Pt admitted to [facility] ED 19 presenting with cardiac arrest and increased SOB. Pt underwent emergent CABG x4 19 with delayed closure on 19. Pt was intubated from 19 to 19. Pt also underwent screw removal from ORIF of R knee bedside on 19. Hardware removal of R tibia and I&D on 19. Pt admitted to [address] [place] [address] for intense rehabiliation on 19. 19 MRI shows R cerebellum infarct. CT on 19 shows Lower parietal infarct. Overall Orientation Status: Within Functional Limits  Restrictions/Precautions  Restrictions/Precautions: Cardiac;General Precautions;Surgical Protocols; Fall Risk(telesitter)  Required Braces or Orthoses?: Yes(R AFO, R procare boot)  Required Braces or Orthoses  Right Lower Extremity Brace: Ankle Foot Orthotics  RLE Brace Type: Profo boot and AFO  Left Lower Extremity Brace: Boot  LLE Brace Type: Profo boot  Position Activity Restriction  Sternal Precautions: No Pushing; No Pulling;5# Lifting Restrictions  Sternal Precautions: Emergent CABG X4  Other position/activity restrictions: Fall Risk    Subjective: Patient without complaints. Vital Signs  Patient Currently in Pain: Yes                   Bed Mobility:   Bed Mobility  Bridging: Stand by assistance  Rolling: Stand by assistance;Rolling Left;Rolling Right  Supine to Sit: Stand by assistance  Sit to Supine: Stand by assistance  Scooting: Stand by assistance  Bed mobility  Bridging: Stand by assistance  Scooting: Stand by assistance    Transfers:  Sit to Stand: Contact guard assistance  Stand to sit: Contact guard assistance  Bed to Chair: Stand by assistance  Stand Pivot Transfers: Contact guard assistance           Ambulation 1  Surface: level tile  Device: Rolling Walker  Assistance: Contact guard assistance  Quality of Gait: High steppage on right LE to clear floor with right foot drop  Distance: 125 ft x 2  Comments: No shoes today. No prafo boots. Stairs/Curb  Stairs?: Yes  Stairs  # Steps : 10  Stairs Height: 6\"  Rails: Bilateral  Device: No Device  Assistance: Contact guard assistance  Comment: Good sequencing with a few reminders. FIMS:                  BALANCE Posture: Fair  Sitting - Static: Good  Sitting - Dynamic: Good  Standing - Static: Good;-(w/RW)  Standing - Dynamic: Fair;+(w/RW)  Comments: standing with RW    EXERCISES    Other exercises?: Yes  Other exercises 1: Supine (B) LE ex x 15; 1.5# (B)  Other exercises 2: Nustep with towels to pad peddles x 10 min L4  Other exercises 3: Seated bilateral LE x 20 reps  Other exercises 4: Blue Tband x 20 reps  Other exercises 5: Standing bilateral LE x 15 reps  Other exercises 6: UBE x 10 min f/b           Activity Tolerance: Patient limited by endurance, Patient limited by fatigue  Activity Tolerance: not able to bear wt on right enough to advance left LE.  Fatigue with exercises  PT Equipment Recommendations  Equipment Needed: use of RW  Short term goal 3: Pt to amb  ft with RW and CGA with no rest break  Short term goal 4: Pt to ascend/descend 5 steps with bilateral handrail and min A x1  Long term goals  Time Frame for Long term goals : by discharge  Long term goal 1: Pt to perform bed mobility independent  Long term goal 2: pt to perform transfers with least restrictive DME and supervision  Long term goal 3: pt to amb with least restrictive -250 ft supervision  Long term goal 4: Pt to ascend/descend a flight of stairs with R handrail supervision  Long term goal 5: Pt to demonstrate understanding of HEP       05/02/19 1015 05/02/19 1300   PT Individual Minutes   Time In 6086 4351   Time Out 3713 3138   Minutes 60 45       Electronically signed by Vale Hung PTA on 5/2/19 at 4:01 PM

## 2019-05-02 NOTE — PROGRESS NOTES
[address]   ACUTE REHABILITATION OCCUPATIONAL THERAPY  DAILY NOTE    Date: 2019  Patient Name: Jonny Mullen        MRN: [id number]    : 1965  (46 y.o.)  Gender: male   Referring Practitioner: Vimal Barcenas MD  Diagnosis: Acute CVA, cardiac arrest, CABG    Restrictions  Restrictions/Precautions: Cardiac, General Precautions, Surgical Protocols, Fall Risk(telesitter)  Sternal Precautions: Emergent CABG X4  Other position/activity restrictions: Fall Risk  Required Braces or Orthoses  Other: Heart Hugger Brace  Right Lower Extremity Brace: Ankle Foot Orthotics  RLE Brace Type: Profo boot and AFO  Left Lower Extremity Brace: Boot  LLE Brace Type: Profo boot      Subjective  Subjective: \"I feel pretty good\" pt stated following shower. Comments: Pt pleasant and cooperative. Patient Currently in Pain: Yes  Pain Level: 5  Pain Location: Leg(wound area)  Pain Orientation: Right  Overall Orientation Status: Within Functional Limits    Objective  Cognition  Overall Cognitive Status: Impaired  Safety Judgement: Decreased awareness of need for safety  Perception  Overall Perceptual Status: WFL  Balance  Sitting Balance: Supervision  Standing Balance: Stand by assistance  Transfers  Sit to stand: Contact guard assistance  Stand to sit: Contact guard assistance  Transfer Comments: pt c fair hand placement-req cues, fair use of cardiac vest during sit<>stands. Standing Balance  Time: AM: 1-2 min x 3 PM: 6-7 min   Activity: AM: ADL tasks, mobility in room PM: therapeutic standing activity at tabletop to increase overall tolerance for functional tasks. Comment: no noted LOB this date. Functional Mobility  Functional - Mobility Device: Rolling Walker  Activity: To/from bathroom  Assist Level: Contact guard assistance  Shower Transfers  Shower - Transfer From: Fariha Angeles - Transfer Type: To and From  Shower - Transfer To:  Transfer tub bench  Shower - Technique: Ambulating  Shower

## 2019-05-03 LAB
GLUCOSE BLD-MCNC: 191 MG/DL (ref 75–110)
GLUCOSE BLD-MCNC: 229 MG/DL (ref 75–110)
GLUCOSE BLD-MCNC: 92 MG/DL (ref 75–110)
GLUCOSE BLD-MCNC: 97 MG/DL (ref 75–110)

## 2019-05-03 PROCEDURE — 6370000000 HC RX 637 (ALT 250 FOR IP): Performed by: NURSE PRACTITIONER

## 2019-05-03 PROCEDURE — 97535 SELF CARE MNGMENT TRAINING: CPT

## 2019-05-03 PROCEDURE — 97116 GAIT TRAINING THERAPY: CPT

## 2019-05-03 PROCEDURE — 97110 THERAPEUTIC EXERCISES: CPT

## 2019-05-03 PROCEDURE — 6370000000 HC RX 637 (ALT 250 FOR IP): Performed by: INTERNAL MEDICINE

## 2019-05-03 PROCEDURE — 97530 THERAPEUTIC ACTIVITIES: CPT

## 2019-05-03 PROCEDURE — 99232 SBSQ HOSP IP/OBS MODERATE 35: CPT | Performed by: INTERNAL MEDICINE

## 2019-05-03 PROCEDURE — 99232 SBSQ HOSP IP/OBS MODERATE 35: CPT | Performed by: PHYSICAL MEDICINE & REHABILITATION

## 2019-05-03 PROCEDURE — 97150 GROUP THERAPEUTIC PROCEDURES: CPT

## 2019-05-03 PROCEDURE — 82947 ASSAY GLUCOSE BLOOD QUANT: CPT

## 2019-05-03 PROCEDURE — 97127 HC SP THER IVNTJ W/FOCUS COG FUNCJ: CPT

## 2019-05-03 PROCEDURE — 6370000000 HC RX 637 (ALT 250 FOR IP): Performed by: STUDENT IN AN ORGANIZED HEALTH CARE EDUCATION/TRAINING PROGRAM

## 2019-05-03 PROCEDURE — 1180000000 HC REHAB R&B

## 2019-05-03 PROCEDURE — 99231 SBSQ HOSP IP/OBS SF/LOW 25: CPT | Performed by: INTERNAL MEDICINE

## 2019-05-03 PROCEDURE — 6370000000 HC RX 637 (ALT 250 FOR IP): Performed by: PHYSICAL MEDICINE & REHABILITATION

## 2019-05-03 PROCEDURE — 6360000002 HC RX W HCPCS: Performed by: NURSE PRACTITIONER

## 2019-05-03 RX ORDER — OXYCODONE HYDROCHLORIDE 5 MG/1
5 TABLET ORAL EVERY 6 HOURS PRN
Status: DISCONTINUED | OUTPATIENT
Start: 2019-05-03 | End: 2019-05-07

## 2019-05-03 RX ADMIN — DOCUSATE SODIUM 100 MG: 50 LIQUID ORAL at 07:35

## 2019-05-03 RX ADMIN — METOPROLOL TARTRATE 25 MG: 25 TABLET ORAL at 07:34

## 2019-05-03 RX ADMIN — CEPHALEXIN 500 MG: 500 CAPSULE ORAL at 12:19

## 2019-05-03 RX ADMIN — DOXYCYCLINE 100 MG: 100 CAPSULE ORAL at 21:57

## 2019-05-03 RX ADMIN — DOCUSATE SODIUM 100 MG: 100 CAPSULE, LIQUID FILLED ORAL at 07:34

## 2019-05-03 RX ADMIN — DOCUSATE SODIUM 100 MG: 100 CAPSULE, LIQUID FILLED ORAL at 21:57

## 2019-05-03 RX ADMIN — OXYCODONE HYDROCHLORIDE 5 MG: 5 TABLET ORAL at 07:35

## 2019-05-03 RX ADMIN — INSULIN LISPRO 2 UNITS: 100 INJECTION, SOLUTION INTRAVENOUS; SUBCUTANEOUS at 12:19

## 2019-05-03 RX ADMIN — OXYCODONE HYDROCHLORIDE 5 MG: 5 TABLET ORAL at 18:32

## 2019-05-03 RX ADMIN — FAMOTIDINE 20 MG: 20 TABLET ORAL at 07:35

## 2019-05-03 RX ADMIN — MULTIPLE VITAMINS W/ MINERALS TAB 1 TABLET: TAB at 07:34

## 2019-05-03 RX ADMIN — ASPIRIN 81 MG 81 MG: 81 TABLET ORAL at 07:34

## 2019-05-03 RX ADMIN — ENOXAPARIN SODIUM 40 MG: 100 INJECTION SUBCUTANEOUS at 07:35

## 2019-05-03 RX ADMIN — CEPHALEXIN 500 MG: 500 CAPSULE ORAL at 06:02

## 2019-05-03 RX ADMIN — INSULIN LISPRO 2 UNITS: 100 INJECTION, SOLUTION INTRAVENOUS; SUBCUTANEOUS at 21:57

## 2019-05-03 RX ADMIN — CEPHALEXIN 500 MG: 500 CAPSULE ORAL at 00:00

## 2019-05-03 RX ADMIN — TRAZODONE HYDROCHLORIDE 50 MG: 50 TABLET ORAL at 21:57

## 2019-05-03 RX ADMIN — CEPHALEXIN 500 MG: 500 CAPSULE ORAL at 17:04

## 2019-05-03 RX ADMIN — ATORVASTATIN CALCIUM 40 MG: 40 TABLET, FILM COATED ORAL at 21:57

## 2019-05-03 RX ADMIN — INSULIN GLARGINE 35 UNITS: 100 INJECTION, SOLUTION SUBCUTANEOUS at 07:36

## 2019-05-03 RX ADMIN — FAMOTIDINE 20 MG: 20 TABLET ORAL at 21:57

## 2019-05-03 RX ADMIN — LISINOPRIL 2.5 MG: 5 TABLET ORAL at 07:34

## 2019-05-03 RX ADMIN — CLOPIDOGREL BISULFATE 75 MG: 75 TABLET ORAL at 07:34

## 2019-05-03 RX ADMIN — DOXYCYCLINE 100 MG: 100 CAPSULE ORAL at 07:34

## 2019-05-03 RX ADMIN — METOPROLOL TARTRATE 25 MG: 25 TABLET ORAL at 21:57

## 2019-05-03 RX ADMIN — POLYETHYLENE GLYCOL 3350 17 G: 17 POWDER, FOR SOLUTION ORAL at 07:35

## 2019-05-03 RX ADMIN — FUROSEMIDE 20 MG: 20 TABLET ORAL at 07:34

## 2019-05-03 ASSESSMENT — PAIN DESCRIPTION - PAIN TYPE
TYPE: ACUTE PAIN;SURGICAL PAIN
TYPE: ACUTE PAIN;SURGICAL PAIN
TYPE: SURGICAL PAIN

## 2019-05-03 ASSESSMENT — PAIN SCALES - GENERAL
PAINLEVEL_OUTOF10: 4
PAINLEVEL_OUTOF10: 5
PAINLEVEL_OUTOF10: 3
PAINLEVEL_OUTOF10: 4

## 2019-05-03 ASSESSMENT — PAIN DESCRIPTION - FREQUENCY: FREQUENCY: INTERMITTENT

## 2019-05-03 ASSESSMENT — PAIN DESCRIPTION - PROGRESSION: CLINICAL_PROGRESSION: NOT CHANGED

## 2019-05-03 ASSESSMENT — PAIN DESCRIPTION - ORIENTATION
ORIENTATION: RIGHT

## 2019-05-03 ASSESSMENT — PAIN DESCRIPTION - LOCATION
LOCATION: LEG
LOCATION: CHEST;INCISION;LEG
LOCATION: LEG
LOCATION: CHEST;LEG

## 2019-05-03 ASSESSMENT — PAIN DESCRIPTION - DESCRIPTORS: DESCRIPTORS: DISCOMFORT

## 2019-05-03 ASSESSMENT — PAIN DESCRIPTION - ONSET: ONSET: ON-GOING

## 2019-05-03 NOTE — PROGRESS NOTES
Speech Language Pathology  Speech Language Pathology  [facility]    Cognitive Treatment Note    Date: 5/3/2019  Patients Name: Janiya Chisholm  MRN: [id number]  Diagnosis:   Patient Active Problem List   Diagnosis Code    PAD (peripheral artery disease) ([facility]) I73.9    Subacute osteomyelitis of right tibia ([facility] E61.935    Metabolic encephalopathy M13.50    Abnormal CT of the head R93.0    Hepatitis K75.9    Acute cerebral infarction associated with systemic hypoxia or ischemia I63.89    Multi infarct state I69.30    Carotid stenosis, bilateral I65.23    Right foot drop M21.371    Thrombocytosis ([facility]) D47.3    Acute CVA (cerebrovascular accident) ([facility] I63.9    Moderate malnutrition ([facility] E44.0    Cellulitis of right lower extremity L03.115       Pain: 0/10    Cognitive Treatment    Treatment time: 2019-0306      Subjective: [x] Alert [x] Cooperative     [] Confused     [] Agitated    [] Lethargic      Objective/Assessment:  Attention: sometimes tangential, needed cues to redirect. Recall:  3 word memory and mental manipulation uncontrolled- 100%, controlled- 88%. Picture detail recall- 100%. Organization: n/a    Problem Solving/Reasoning: dual word meanings- 100%. Other:     Plan:  [x] Continue ST services    [] Discharge from ST:      Discharge recommendations: [] Inpatient Rehab   [] [place]   [] Outpatient Therapy  [] Follow up at trauma clinic   [] Other:       Treatment completed by: Azalea Taylor A.CCC/SLP

## 2019-05-03 NOTE — PROGRESS NOTES
Physical Therapy  [address] [facility]  Acute Rehabilitation Physical Therapy Progress Note    Date: 5/3/19  Patient Name: Stephan Hendricks       Room: 4509/8852-45  MRN: [id number]   Account: [de-identified]   : 1965  (46 y.o.) Gender: male     Referring Practitioner: Kermit Barragan MD  Diagnosis: Acute CVA, cardiac arrest, CABG  Past Medical History:  has a past medical history of CAD (coronary artery disease), Cardiac arrest ([facility] Osteolysis, PEA (Pulseless electrical activity) ([facility] [address] and PVD (peripheral vascular disease) ([facility] [address] Past Surgical History:   has a past surgical history that includes Cardiac catheterization (2019); Femur Surgery; Tonsillectomy; Coronary artery bypass graft (2019); Hardware Removal (Right, 2019); Tibia fracture surgery (Right); Coronary artery bypass graft (N/A, 2019); RECONSTRUCTIVE REPAIR STERNAL (N/A, 2019); Hardware Removal (Right, 2019); and REMOVE HARDWARE FEMUR (N/A, 2019). Additional Pertinent Hx: Pt admitted to [place] [address] ED 19 presenting with cardiac arrest and increased SOB. Pt underwent emergent CABG x4 19 with delayed closure on 19. Pt was intubated from 19 to 19. Pt also underwent screw removal from ORIF of R knee bedside on 19. Hardware removal of R tibia and I&D on 19. Pt admitted to [address] [place] [address] for intense rehabiliation on 19. 19 MRI shows R cerebellum infarct. CT on 19 shows Lower parietal infarct. Overall Orientation Status: Within Functional Limits  Restrictions/Precautions  Restrictions/Precautions: Cardiac;General Precautions;Surgical Protocols; Fall Risk(telesitter)  Required Braces or Orthoses?: Yes(R AFO, R procare boot)  Required Braces or Orthoses  Right Lower Extremity Brace: Ankle Foot Orthotics  RLE Brace Type: Profo boot and AFO  Left Lower Extremity Brace: Boot  LLE Brace Type: Profo boot  Position Activity Restriction  Sternal Precautions: No Pushing; No Pulling;5# Lifting Restrictions  Sternal Precautions: Emergent CABG X4  Other position/activity restrictions: Fall Risk        Subjective: Patient without complaints. Patient Currently in Pain: Yes  Pain Assessment: 0-10  Pain Level: 4  Pain Type: Acute pain;Surgical pain  Pain Location: Chest;Incision;Leg  Pain Orientation: Right  Non-Pharmaceutical Pain Intervention(s): Emotional support; Ambulation/Increased Activity  Response to Pain Intervention: Patient Satisfied                Bed Mobility:   Bridging: Stand by assistance  Rolling: Stand by assistance;Rolling Left;Rolling Right  Supine to Sit: Stand by assistance  Sit to Supine: Stand by assistance  Scooting: Stand by assistance      Transfers:  Sit to Stand: Contact guard assistance  Stand to sit: Contact guard assistance  Bed to Chair: Stand by assistance  Stand Pivot Transfers: Contact guard assistance                  Ambulation 1  Surface: level tile  Device: Rolling Walker  Other Apparatus: Right;Dorsiflex Assist  Assistance: Contact guard assistance  Quality of Gait: High steppage on right LE to clear floor with right foot drop  Distance: 150 ft  twice; short distances within gym. Comments: Patient had tennis shoes with amb. today. Vitals WNL post amb. Stairs/Curb  Stairs?: Yes  Stairs  # Steps : 15  Stairs Height: 6\"  Rails: Bilateral  Device: No Device  Assistance: Contact guard assistance  Comment: Good sequencing with a few reminders.   Vitals WNL post steps                          FIMS:      TRANSFERS  Bed, Chair, Wheel Chair: 4 - Requires steadying assistance only <25% assist  and/or requires assist with one leg only   LOCOMOTION  Primary Mode: Walk  Distance Walked: 150 ft  Walk: 4 - Contact Guard/Minimal Assistance Requires up to Contact Guard or Minimal Assistance to walk at least 150 feet  Stairs: 4- Minimal Contact Assistance Perfoms 75% or more of the effort to go up and down one flight of stairs       BALANCE Posture: Fair  Sitting - Static: Good  Sitting - Dynamic: Good  Standing - Static: Good;-(w/RW)  Standing - Dynamic: Fair;+(w/RW)  Comments: standing with RW    EXERCISES    Other exercises?: Yes  Other exercises 1: Supine (B) LE ex x 20; 1.5# (B)  Other exercises 2: Nustep x 10 min L4  Other exercises 3: Seated bilateral LE x 20 reps  Other exercises 4: Blue Tband x 20 reps  Other exercises 5: Standing bilateral LE x 15 reps  Other exercises 6: UBE x 10 min f/b           Activity Tolerance: Patient limited by endurance, Patient limited by fatigue  PT Equipment Recommendations  Equipment Needed: (TBD)  Other: TBD         Current Treatment Recommendations: ROM, Strengthening, Functional Mobility Training, Endurance Training, Transfer Training, Gait Training, Stair training, Balance Training, Neuromuscular Re-education, Home Exercise Program, Safety Education & Training, Patient/Caregiver Education & Training, Equipment Evaluation, Education, & procurement    Conditions Requiring Skilled Therapeutic Intervention  Body structures, Functions, Activity limitations: Decreased functional mobility ; Decreased strength;Decreased endurance;Decreased coordination;Decreased safe awareness;Decreased cognition;Decreased ROM; Decreased balance  Treatment Diagnosis: decreased functional mobility  Prognosis: Good  REQUIRES PT FOLLOW UP: Yes  Discharge Recommendations: Home with assist PRN    Goals  Short term goals  Time Frame for Short term goals: 1 week  Short term goal 1: Pt to perform bed mobility mod I  Short term goal 2: Pt to perform transfers CGA with use of RW  Short term goal 3: Pt to amb  ft with RW and CGA with no rest break  Short term goal 4: Pt to ascend/descend 5 steps with bilateral handrail and min A x1  Long term goals  Time Frame for Long term goals : by discharge  Long term goal 1: Pt to perform bed mobility independent  Long term goal 2: pt to perform transfers with least restrictive DME and supervision  Long term goal 3: pt to amb with least restrictive -250 ft supervision  Long term goal 4: Pt to ascend/descend a flight of stairs with R handrail supervision  Long term goal 5: Pt to demonstrate understanding of HEP       05/03/19 1030 05/03/19 1455   PT Individual Minutes   Time In 2713 [id number]   Time Out 1356 1718   Minutes 39 45       Electronically signed by Isaac Vicente PTA on 5/3/19 at 4:26 PM

## 2019-05-03 NOTE — PROGRESS NOTES
Infectious disease Consult Note      Patient: Janiya Chisholm  : 1965  Acct#:  [id number]     Date:  5/3/2019    Subjective:       History of Present Illness  Patient is a 46 y.o.  male admitted to  [facility] acute rehab unit S/P hospitalization at [place] for Cardiac arrest  Acute CVA (cerebrovascular accident)  who is seen in consult for increase right calf swelling and redness . He was admitted initially to [facility] on 2019 with increased shortness of breath and found to have PEA arrest,had emergent cardiac cath which revealed multivessel coronary artery disease with severe LV dysfunction followed by  CABG ×4 on 19 with delayed closure of chest wall on 19. His hospital course was complicated by acute renal failure . CT brain was suggestive of infarct. He had history of right proximal tibia fracture status post ORIF apparently he was not compliant with follow-up and ended up with a hardware protruding through the skin. The hardware was removed on 2019 STAPHYLOCOCCUS SPECIES, COAGULASE NEGATIVE SCANT GROWTH on tissue cultures. He was placed on oral doxycycline chronically for suspected infected hardware and chronic osteomyelitis. Interval history: The  redness and swelling to the right calf area improved . No new complaints. He denied any fever or chills, denied nausea vomiting or diarrhea.     Past Medical History:   Diagnosis Date    CAD (coronary artery disease) 2019    Cardiac arrest ([facility] 2019    Osteolysis 2019    R Knee hardware    PEA (Pulseless electrical activity) ([facility] 2019    PVD (peripheral vascular disease) ([facility] [facility] 2019      Past Surgical History:   Procedure Laterality Date    CARDIAC CATHETERIZATION  2019    MVD-Stv    CORONARY ARTERY BYPASS GRAFT  04/07/2019    x4 per Dr. Eugene Smith, [address] GRAFT N/A 2019    EMERGENT CPR,  CORONARY ARTERY BYPASS X4, ON PUMP ,CHEST LEFT OPEN; SWAN, ANGELA PER ANESTHESIA performed by Tyler Becerra MD at [address] [facility]      unsure if correct known tibial surgery    HARDWARE REMOVAL Right 04/07/2019    R Knee internal screw protruding through skin    HARDWARE REMOVAL Right 04/22/2019    HARDWARE REMOVAL RIGHT TIBIA,    RECONSTRUCTIVE REPAIR STERNAL N/A 4/8/2019    STERNAL  WOUND WASHOUT AND CLOSURE S/P CABG performed by Tyler Becerra MD at [address] N/A 4/22/2019    HARDWARE REMOVAL RIGHT TIBIA, IRRIGATION AND DEBRIDEMENT [address] [facility] performed by Sally Gillespie MD at [address] Right     Isaias Umanzor off roof, external fixation and internal hardware    TONSILLECTOMY            Admission Meds  No current facility-administered medications on file prior to encounter. No current outpatient medications on file prior to encounter. Allergies  No Known Allergies     Social   Social History     Tobacco Use    Smoking status: Former Smoker   Substance Use Topics    Alcohol use: Not on file                No family history on file. Review of Systems  No fever / chills / sweats. No weight loss. No visual change, eye pain, eye discharge. No oral lesion, sore throat, dysphagia. Denies cough / sputum. Denies chest pain, palpitations. Denies n / v / abd pain. No diarrhea. Denies dysuria or change in urinary function. Other than above 14 systems reviewed were negative . Tolerating antibiotics.          Physical Exam  /70   Pulse 94   Temp 97.7 °F (36.5 °C) (Oral)   Resp 16   Ht 5' 8.11\" (1.73 m)   Wt 134 lb (60.8 kg)   SpO2 98%   BMI 20.31 kg/m²           General Appearance: alert and oriented to person, place and time, well-developed and well-nourished, in no acute distress  Skin: warm and dry, no rash   Head: normocephalic and atraumatic  Eyes: pupils equal, round, and reactive to light, extraocular eye movements 4/7/2019 3:28 pm COMPARISON: None. HISTORY: ORDERING SYSTEM PROVIDED HISTORY: Trauma/Fracture TECHNOLOGIST PROVIDED HISTORY: Trauma/Fracture FINDINGS: Sideplate and 4 screws transfix the proximal tibia laterally. Old healed fracture proximal fibular neck and head. Spurring lateral tibia plateau. Proximal tibiofibular syndesmosis. Visualized femur and patella normal. Soft tissues unremarkable. ORIF proximal tibia. No acute fracture. Xr Tibia Fibula Right (2 Views)    Result Date: 4/22/2019  Radiology exam is complete. No Radiologist dictation. Please follow up with ordering provider. Ct Head Wo Contrast    Result Date: 4/13/2019  EXAMINATION: CT OF THE HEAD WITHOUT CONTRAST  4/13/2019 4:29 pm TECHNIQUE: CT of the head was performed without the administration of intravenous contrast. Dose modulation, iterative reconstruction, and/or weight based adjustment of the mA/kV was utilized to reduce the radiation dose to as low as reasonably achievable. COMPARISON: 04/12/2019 HISTORY: ORDERING SYSTEM PROVIDED HISTORY: f/u ct, s/p cardiac arrest, ? attenuation in L parietal lobe TECHNOLOGIST PROVIDED HISTORY: Ordering Physician Provided Reason for Exam: f/u ct, s/p cardiac arrest, ? attenuation in L parietal lobe Acuity: Unknown Type of Exam: Unknown FINDINGS: BRAIN/VENTRICLES: There is no evidence of acute infarct or hemorrhage. Previously suspected left parietal hypodensity is better seen on the current study to represent partial volume averaging of a sulcus. Bilateral anterior frontal and high left frontoparietal encephalomalacia is re- demonstrated. There is no hydrocephalus or extra-axial fluid collection. Midline is maintained. Basal cisterns are patent. ORBITS: The visualized portion of the orbits demonstrate no acute abnormality. SINUSES: Diffuse paranasal sinus mucoperiosteal thickening. Trace bilateral mastoid effusions. These findings may relate to intubation.  SOFT TISSUES/SKULL:  No acute abnormality of the visualized skull or soft tissues. 1. No acute intracranial abnormality. 2. Stable multifocal encephalomalacia. Ct Head Wo Contrast    Result Date: 4/12/2019  EXAMINATION: CT OF THE HEAD WITHOUT CONTRAST  4/12/2019 10:25 am TECHNIQUE: CT of the head was performed without the administration of intravenous contrast. Dose modulation, iterative reconstruction, and/or weight based adjustment of the mA/kV was utilized to reduce the radiation dose to as low as reasonably achievable. COMPARISON: None. HISTORY: ORDERING SYSTEM PROVIDED HISTORY: NEURO DEFICIT(S), SUBACUTE, PROGRESSIVE OR FLUCTUATING TECHNOLOGIST PROVIDED HISTORY: Ordering Physician Provided Reason for Exam: History of Present Illness: This is a 46 y.o. male with unknown medical history admitted for management of cardiac arrest and STEMI. Patient initially called EMS for worsening SOB, placed on CPAP, given breathing treatments, solumedrol, aspirin, transport initiated to [facility]. EKG done by EMS showed possible anterior septal STEMI. On arrival to [facility] ED, pulses were lost. ACLS protocol initiated, CPR initiated patient intubated. EKG showed PEA. Three rounds CPR with epi given, had return of pulses. Bedside ECHO at that time showed globally decreased systolic contraction. Cardiology consulted, agreed for cath. Cath done showed multivessel CAD with severe LV systolic dysfunction EF 99% with severe hypokinesis of ant and mid inferior wall and apical akinesis. Right iliac a also noted to be occluded. FINDINGS: BRAIN/VENTRICLES: There is no acute intracranial hemorrhage, mass effect or midline shift. No abnormal extra-axial fluid collection. The gray-white differentiation is maintained without evidence of an acute infarct. There is no evidence of hydrocephalus. Encephalomalacia is present in the left frontal lobe consistent with remote infarct.   Punctate density is noted along the encephalomalacia which may represent an area of dystrophic calcification. An additional area of encephalomalacia is noted in the high left parietal lobe. An area of encephalomalacia is also present in the right frontal lobe, and there is hypodensity in the left posterior parietal lobe which may represent subacute to chronic infarct. Scattered hypodensities are present in the white matter consistent with chronic microvascular change. Vertebral and internal carotid arteries are calcified. Small extradural calcification is noted near the vertex, right side. This may represent a small meningioma without mass effect of 8 mm. ORBITS: The visualized portion of the orbits demonstrate no acute abnormality. SINUSES: Moderate mucoperiosteal thickening is noted in the maxillary and sphenoid sinuses. Frontal sinuses are hypoplastic. Significant mucoperiosteal thickening is noted in the ethmoid air cells. Mastoid air cells are appropriately aerated. SOFT TISSUES/SKULL:  No acute abnormality of the visualized skull or soft tissues. Estimated biologic radiation dose for this procedure:1059.07 mGy/cm2. Scattered areas of encephalomalacia, bifrontal and high left parietal areas consistent with remote infarct. There is hypodensity in the left lower parietal region possibly a subacute infarct. Chronic microvascular changes are noted. A punctate density is noted adjacent to the encephalomalacia in the left frontal lobe, favoring dystrophic calcification over petechial hemorrhage. RECOMMENDATIONS: MRI imaging of the brain is suggested given differing ages of the ischemic changes in the patient's history. Nm Hepatobiliary    Result Date: 4/15/2019  EXAMINATION: NUCLEAR MEDICINE HEPATOBILIARY SCINTIGRAPHY (HIDA SCAN). 4/15/2019 8:28 am TECHNIQUE: Approximately 3 [id number] Mebrofenin (Choletec) was administered IV. Then, dynamic images of the abdomen were obtained in the anterior projection for 60 mins. COMPARISON: Renal ultrasound April 12, 2019.  HISTORY: ORDERING SYSTEM PROVIDED HISTORY: CHOLECYSTITIS TECHNOLOGIST PROVIDED HISTORY: Ordering Physician Provided Reason for Exam: Thickened GB wall·Hepatitis  - possible amiodarone, GB w sludge FINDINGS: Prompt, homogenous uptake by the liver is noted with normal appearance of radiotracer excretion into the biliary system. Clearance of bloodpool activity appears appropriate. Gallbladder and small bowel is visualized in appropriate sequence. No scintigraphic evidence of biliary obstruction. Us Renal Complete    Result Date: 4/13/2019  EXAMINATION: RETROPERITONEAL ULTRASOUND OF THE KIDNEYS 4/12/2019 COMPARISON: None HISTORY: ORDERING SYSTEM PROVIDED HISTORY: RENAL FAILURE, ACUTE (KIDNEY INJURY) TECHNOLOGIST PROVIDED HISTORY: increased creatinine FINDINGS: Kidneys: The right kidney measures 12.5 cm in length and the left kidney measures 12.6 cm in length. Kidneys demonstrate normal cortical echogenicity. No evidence of hydronephrosis or intrarenal stones. There is 1.1 x 1.0 x 0.8 cm left renal cyst. Other: Partially imaged gallbladder sludge. Apparent gallbladder wall thickening. 1. No evidence of hydronephrosis. Normal renal cortical echogenicity. 2. Partially imaged gallbladder sludge. Apparent gallbladder wall thickening. Consider further evaluation with dedicated right upper quadrant ultrasound. Xr Chest Portable    Result Date: 4/19/2019  EXAMINATION: SINGLE XRAY VIEW OF THE CHEST 4/19/2019 6:38 am COMPARISON: April 17, 2019 HISTORY: ORDERING SYSTEM PROVIDED HISTORY: intubated TECHNOLOGIST PROVIDED HISTORY: intubated FINDINGS: Endotracheal tube with the tip just below the level of the clavicles. Enteric tube with the tip within the stomach. Partial clearing at the right lung base. Persistent left lung base opacities. Sternotomy. Cardiomegaly. Skin staples. No definite pulmonary edema. Partial clearing at the right lung base. Persistent left lung base opacity.      Xr Chest Portable    Result Date: pleural effusion. Findings may represent CHF related changes and/or multifocal pneumonia. Follow-up is recommended to document resolution. 2. Endotracheal and enteric tubes as detailed above. 3. Stable mild cardiomegaly. Xr Chest Portable    Result Date: 4/15/2019  EXAMINATION: SINGLE XRAY VIEW OF THE CHEST 4/14/2019 5:53 am COMPARISON: Chest radiograph 04/10/2019 HISTORY: ORDERING SYSTEM PROVIDED HISTORY: pt intubated TECHNOLOGIST PROVIDED HISTORY: pt intubated FINDINGS: Endotracheal tube and nasogastric tube are in unchanged position. Stable cardiomegaly. Pulmonary edema has improved since the prior examination. Small left pleural effusion and left basilar atelectasis have improved since the prior examination. No pneumothorax. Stable support lines and tubes. Pulmonary edema, small left pleural effusion, and left basilar atelectasis have improved since the prior examination. Xr Chest Portable    Result Date: 4/10/2019  EXAMINATION: SINGLE XRAY VIEW OF THE CHEST 4/10/2019 6:47 am COMPARISON: Chest radiograph performed 04/09/2019. HISTORY: ORDERING SYSTEM PROVIDED HISTORY: post op TECHNOLOGIST PROVIDED HISTORY: post op FINDINGS: There is redemonstration of bilateral pulmonary congestion/edema that is similar compared to prior. There are bibasilar effusions. There is no pneumothorax. The heart is prominent. The upper abdomen is unremarkable. The extrathoracic soft tissues are unremarkable. There is an endotracheal tube with the tip in the midtrachea. There is a gastric tube with the tip in the proximal aspect of the stomach. The right internal jugular line has been removed. The [place]-Carly catheter has been removed. Cardiomegaly and bilateral pulmonary congestion/edema that is similar compared to prior. Bibasilar effusions and stable endotracheal tube and gastric tube.      Xr Chest Portable    Result Date: 4/9/2019  EXAMINATION: SINGLE XRAY VIEW OF THE CHEST 4/9/2019 5:30 am COMPARISON: 04/08/2019 HISTORY: ORDERING SYSTEM PROVIDED HISTORY: post op TECHNOLOGIST PROVIDED HISTORY: post op FINDINGS: Endotracheal tube terminates 3.3 cm above the ramu. [place]-Carly catheter terminates overlying the pulmonary outflow tract. Multiple mediastinal drainage tube projects over the cardiac silhouette. Central vascular congestion and diffuse interstitial opacities, stable or slightly worsened. Small left pleural effusion suspected. Pulmonary vascular congestion and interstitial opacities, stable or slightly worse on current examination. Support tubes and lines as above, in grossly unchanged positioning. Xr Chest Portable    Result Date: 4/8/2019  EXAMINATION: SINGLE XRAY VIEW OF THE CHEST 4/8/2019 2:09 pm COMPARISON: 4/8/2019 HISTORY: ORDERING SYSTEM PROVIDED HISTORY: post op chest closure TECHNOLOGIST PROVIDED HISTORY: post op chest closure FINDINGS: Intact median sternotomy wires. Midline skin staples. ET tube in good position. Mediastinal drain present. Enteric tube within the expected location of the stomach. [place]-Carly catheter within the right main pulmonary artery as demonstrated previously. Stable cardiomegaly. Asymmetric pulmonary edema right greater than left, similar to prior. Small left effusion. No pneumothorax. Stable retrocardiac atelectasis. Similar bilateral asymmetric pulmonary edema right greater than left. Xr Chest Portable    Result Date: 4/8/2019  EXAMINATION: SINGLE XRAY VIEW OF THE CHEST 4/8/2019 5:06 am COMPARISON: Chest x-ray 04/07/2019 HISTORY: ORDERING SYSTEM PROVIDED HISTORY: post op TECHNOLOGIST PROVIDED HISTORY: post op FINDINGS: Endotracheal tube tip remains above the ramu. Left subclavian approach pulmonary artery catheter with the tip over the right main pulmonary artery. Mediastinal drains and left chest tube. No pneumothorax. There continue to be bilateral perihilar opacities, not significantly changed. Increasing left basilar opacity.   Cardiac contrast. Multiplanar reformatted images are provided for review. MIP images are provided for review. Stenosis of the internal carotid arteries measured using NASCET criteria. Dose modulation, iterative reconstruction, and/or weight based adjustment of the mA/kV was utilized to reduce the radiation dose to as low as reasonably achievable. 3D reconstructed images were performed on a separate workstation and provided for review. COMPARISON: CT head 04/12/2019 HISTORY: ORDERING SYSTEM PROVIDED HISTORY: STROKE TECHNOLOGIST PROVIDED HISTORY: Ordering Physician Provided Reason for Exam: STROKE S/P CABG Additional signs and symptoms: PT INTUBATED FINDINGS: CTA NECK: AORTIC ARCH/ARCH VESSELS: Moderate plaque identified involving the aortic arch is visualized. 3 vessel configuration of the aortic arch. Moderate severe stenosis identified involving the left common carotid artery origin and proximal common carotid artery and involving the left iliac artery. Motion artifact through the ostia great vessels challenge evaluation. There is 60-70% stenosis identified involving the proximal left common carotid artery and left subclavian artery. Mild-to-moderate focal stenosis identified involving the ostium of the right brachiocephalic artery measuring 30%. CAROTID ARTERIES: Left common carotid artery demonstrates moderate diffuse atherosclerotic disease with focal stenosis involving the left ostium measuring 60-70%. Right common carotid artery noted is mild diffuse atherosclerotic disease without hemodynamic stenosis or occlusion. . Right internal carotid artery demonstrates moderate diffuse atherosclerotic disease without up 60% stenosis per NASCET criteria. Left internal carotid artery is occluded at its origin sent to level skull base. No dissection or arterial injury is seen on the right. . 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 Right vertebral artery demonstrates mild stenosis at this ostium but otherwise grossly patent. Kelsy Tate SOFT TISSUES: Large bilateral pleural fluid collections. Bibasilar airspace disease and atelectasis. Chronic emphysematous changes. .  No cervical or superior mediastinal lymphadenopathy. The visualized portion of the larynx and pharynx appear unremarkable. The parotid, submandibular and thyroid glands demonstrate no acute abnormality. BONES: The visualized osseous structures demonstrate multilevel degenerative changes. . CTA HEAD: ANTERIOR CIRCULATION: Right petrous segment, cavernous segments cervical segments are patent. Left petrous, cavernous segments are occluded. Reconstitution left carotid terminus likely due to combination of extracranial and intracranial collateral flow. The anterior cerebral and middle cerebral arteries demonstrate no focal stenosis. POSTERIOR CIRCULATION: Bilateral P comms. The posterior cerebral arteries demonstrate no focal stenosis. The vertebral and basilar arteries appear unremarkable. BRAIN: No mass effect or midline shift. No hyperdense extra-axial fluid collection. Chronic encephalomalacia left TOAN distribution involving both inferior frontal lobes. The gray-white differentiation appears grossly maintained. Air-fluid levels both sphenoid sinuses and posterior ethmoid air cells. Occlusion left ICA vertebral artery  extending to level skull base. Moderate atherosclerotic changes identified involving the remainder of the cervical arterial vasculature. Reconstitution left carotid terminus. Otherwise no hemodynamic stenosis or occlusion identified involving intracranial circulation .      Cta Head W Contrast    Result Date: 4/18/2019  EXAMINATION: CTA OF THE HEAD WITH CONTRAST; CTA OF THE NECK 4/18/2019 5:31 pm: TECHNIQUE: CTA of the head/brain was performed with the administration of intravenous contrast. Multiplanar reformatted images are provided for review. MIP images are provided for review. Dose modulation, iterative reconstruction, and/or weight based adjustment of the mA/kV was utilized to reduce the radiation dose to as low as reasonably achievable.; CTA of the neck was performed with the administration of intravenous contrast. Multiplanar reformatted images are provided for review. MIP images are provided for review. Stenosis of the internal carotid arteries measured using NASCET criteria. Dose modulation, iterative reconstruction, and/or weight based adjustment of the mA/kV was utilized to reduce the radiation dose to as low as reasonably achievable. 3D reconstructed images were performed on a separate workstation and provided for review. COMPARISON: CT head 04/12/2019 HISTORY: ORDERING SYSTEM PROVIDED HISTORY: STROKE TECHNOLOGIST PROVIDED HISTORY: Ordering Physician Provided Reason for Exam: STROKE S/P CABG Additional signs and symptoms: PT INTUBATED FINDINGS: CTA NECK: AORTIC ARCH/ARCH VESSELS: Moderate plaque identified involving the aortic arch is visualized. 3 vessel configuration of the aortic arch. Moderate severe stenosis identified involving the left common carotid artery origin and proximal common carotid artery and involving the left iliac artery. Motion artifact through the ostia great vessels challenge evaluation. There is 60-70% stenosis identified involving the proximal left common carotid artery and left subclavian artery. Mild-to-moderate focal stenosis identified involving the ostium of the right brachiocephalic artery measuring 30%. CAROTID ARTERIES: Left common carotid artery demonstrates moderate diffuse atherosclerotic disease with focal stenosis involving the left ostium measuring 60-70%. Right common carotid artery noted is mild diffuse atherosclerotic disease without hemodynamic stenosis or occlusion. . Right internal carotid artery demonstrates moderate diffuse atherosclerotic disease without up 60% stenosis per NASCET criteria. Left internal carotid artery is occluded at its origin sent to level skull base. No dissection or arterial injury is seen on the right. . 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 Right vertebral artery demonstrates mild stenosis at this ostium but otherwise grossly patent. Bebe Chars SOFT TISSUES: Large bilateral pleural fluid collections. Bibasilar airspace disease and atelectasis. Chronic emphysematous changes. .  No cervical or superior mediastinal lymphadenopathy. The visualized portion of the larynx and pharynx appear unremarkable. The parotid, submandibular and thyroid glands demonstrate no acute abnormality. BONES: The visualized osseous structures demonstrate multilevel degenerative changes. . CTA HEAD: ANTERIOR CIRCULATION: Right petrous segment, cavernous segments cervical segments are patent. Left petrous, cavernous segments are occluded. Reconstitution left carotid terminus likely due to combination of extracranial and intracranial collateral flow. The anterior cerebral and middle cerebral arteries demonstrate no focal stenosis. POSTERIOR CIRCULATION: Bilateral P comms. The posterior cerebral arteries demonstrate no focal stenosis. The vertebral and basilar arteries appear unremarkable. BRAIN: No mass effect or midline shift. No hyperdense extra-axial fluid collection. Chronic encephalomalacia left TOAN distribution involving both inferior frontal lobes. The gray-white differentiation appears grossly maintained. Air-fluid levels both sphenoid sinuses and posterior ethmoid air cells. Occlusion left ICA vertebral artery  extending to level skull base. Moderate atherosclerotic changes identified involving the remainder of the cervical arterial vasculature. Reconstitution left carotid terminus.   Otherwise no hemodynamic stenosis or occlusion identified involving intracranial circulation . Mri Brain Wo Contrast    Result Date: 4/14/2019  EXAMINATION: MRI OF THE BRAIN WITHOUT CONTRAST  4/14/2019 8:40 pm TECHNIQUE: Multiplanar multisequence MRI of the brain was performed without the administration of intravenous contrast. COMPARISON: CT head earlier today. HISTORY: ORDERING SYSTEM PROVIDED HISTORY: DECREASED ALERTNESS Initial encounter. FINDINGS: INTRACRANIAL STRUCTURES/VENTRICLES: There are small foci of diffusion restriction in the right brachium pontis, right cerebellar hemisphere, posterior left and right frontal centrum semiovale white matter, and the posterior right frontal corona radiata white matter. Bilateral anterior frontal and high left frontoparietal encephalomalacia is noted with surrounding gliosis. No mass effect or midline shift. No evidence of an acute intracranial hemorrhage. The ventricles and sulci are normal in size and configuration. The sellar/suprasellar regions appear unremarkable. There is absent flow void in the visualized left internal carotid artery. ORBITS: Orbital structures are unremarkable. SINUSES: Dependent paranasal sinus mucoperiosteal thickening and layering fluid with bilateral mastoid effusions may relate to intubation. BONES/SOFT TISSUES: The bone marrow signal intensity appears normal. The soft tissues demonstrate no acute abnormality. 1. Multiple acute ischemic infarcts in the right brachium pontis, right cerebellar hemisphere, and bilateral cerebral white matter as detailed above with distribution in multiple vascular territories suggesting central embolic etiology. 2. No hemorrhage or mass effect. 3. Bilateral anterior frontal and high left frontoparietal encephalomalacia consistent with sequela of prior insult. 4. Absent flow void in the left internal carotid artery consistent with high-grade proximal stenosis or occlusion. Assessment:     Right calf cellulitis .   Chronic steomyelitis of right tibia /infected hardware that was removed on 4/22/2019. History of ORIF of the right proximal tibia, noncompliant with follow-up with reported protruding hardware through the skin. Acute renal failure resolved     Coronary artery disease /recent cardiac arrest status post emergent cath and CABG on 4/7/2019 with delayed the chest wall incision closure. Acute CVA (cerebrovascular accident)     Moderate malnutrition ([address]      Recommendations:   Continue PO Doxycycline  for chronic suppression     PO Keflex  until 5/10/19 if the patient continued to improve. F follow CBC and renal function. Further recommendation as per hospital course. Thank you for allowing me to participate in the care of your patient. Please feel free to contact me with any questions or concerns.      Naye Mcfadden MD

## 2019-05-03 NOTE — PROGRESS NOTES
[address]   ACUTE REHABILITATION OCCUPATIONAL THERAPY  DAILY NOTE    Date: 5/3/2019  Patient Name: Gustavo Conley        MRN: [id number]    : 1965  (46 y.o.)  Gender: male   Referring Practitioner: Juan Smith MD  Diagnosis: Acute CVA, cardiac arrest, CABG    Restrictions  Restrictions/Precautions: Cardiac, General Precautions, Surgical Protocols, Fall Risk(telesitter)  Sternal Precautions: Emergent CABG X4  Other position/activity restrictions: Fall Risk  Required Braces or Orthoses  Other: Heart Hugger Brace  Right Lower Extremity Brace: Ankle Foot Orthotics  RLE Brace Type: Profo boot and AFO  Left Lower Extremity Brace: Boot  LLE Brace Type: Profo boot      Subjective  Comments: Pt pleasant and cooperative. Patient Currently in Pain: Yes  Pain Level: 4  Pain Location: Chest;Leg(Incision areas)  Overall Orientation Status: Within Functional Limits    Objective  Cognition  Overall Cognitive Status: Impaired  Memory: Decreased short term memory(AEB continued cueing for safety precautions. )  Safety Judgement: Decreased awareness of need for safety(c transfers/mobility )  Insights: Decreased awareness of deficits  Sequencing and Organization: Assistance required to identify errors made(c med mgmt task this session. )  Perception  Overall Perceptual Status: Impaired  Initiation: Cues to initiate tasks(minimal)  Balance  Sitting Balance: Supervision  Standing Balance: Contact guard assistance(SBA-CGA)  Transfers  Sit to stand: Contact guard assistance  Stand to sit: Contact guard assistance  Transfer Comments: pt c fair hand placement-req cues, fair use of cardiac vest during sit<>stands.    Standing Balance  Time: AM: 1-2 min, 2-3 min, <1 min (LOB-see IADL task)  Activity: AM: mobility in room, sinkside ADLs/toileting, IADL task  Functional Mobility  Functional - Mobility Device: Rolling Walker  Activity: To/from bathroom  Assist Level: Contact guard assistance  Functional Mobility Comments: Pt unsteady d/t R foot drop, decreased safety awareness c RW mgmt. Toilet Transfers  Toilet - Technique: Ambulating  Equipment Used: Grab bars  Toilet Transfer: Contact guard assistance     Type of ROM/Therapeutic Exercise  Type of ROM/Therapeutic Exercise: Cane/Wand(2# 2x20 reps)  Comment: Pt completes BUE ex's to address overall strength and endurance for functional tasks. Pt completed ex's in seated position, slow and controlled, shortened rest breaks req. All planes of mvmt within cardiac restrictions. Improved tolerance noted this date. Exercises  Scapular Protraction: x  Scapular Retraction: x  Shoulder Flexion: x  Shoulder Extension: x  Elbow Flexion: x  Elbow Extension: x  Grasp/Release: 2nd setting hand gripper 2x30 reps B hands to improve  strength for functional tasks. (Pt reports very helpful d/t on the job tasks for [employer])           Functional Mobility  Functional - Mobility Device: Rolling Walker  Activity: To/from bathroom  Assist Level: Contact guard assistance  Functional Mobility Comments: Pt unsteady d/t R foot drop, decreased safety awareness c RW mgmt. Light Housekeeping  Light Housekeeping Level: Hetal [employer] Housekeeping Level of Assistance: Stand by assistance, Contact guard assistance  Light Housekeeping: Pt loaded clothing into washer-x1 LOB req washer to get self for support. Writer provided A for proper settings. Health Management  Health Management Level: Wheelchair(seated at tabletop )  Health Management Level of Assistance: Stand by assistance(req cueing for direction and recognition of errors)  Health Management: Pt participated in med mgmt task to address good understanding and safety for his own medication mgmt as well as his residents. 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  Pt reports prior to this admission he did not take any pills himself as part of his daily routine. Pt states he is familiar c weekly pill organizers-typically uses x2 daily x7 day week containers or \"we use baggies sometimes\". Writer educ on importance of good med mgmt and purpose of provided therapeutic task-pt verbalizes good understanding. Pt organizes x4 different types of faciliated medications c 50% accuracy. Pt req assist for recognition of errors c medications listed as: \"Take 2 tablets 2x a day\" and \"Take 1 tablet twice daily\". Writer provided cueing on error, pt was able to verbalize proper correction. Instrumental ADL's  Instrumental ADLs: Yes    OT FIM:   Eatin - Feeds self with adaptive equipment/dentures and/or feeds self with modified diet and/or performs own tube feeding(per pt report for breakfast. )  Groomin - Requires setup/cues to do all tasks(sinkside standing for oral care & face/hand washing. )  Bathin - Did not occur(Pt declined )  Dressing-Upper: 5 - Requires setup/supervision/cues and/or requires assist with presthesis/brace only(pt declined to change shirt, A c adjusting heart hugger)  Dressing-Lower: 5 - Requires setup/supervision/cues and/or staff applies TEDS/prosthesis/brace only(set up. Pt donned B socks/shoes. )  Toiletin - Requires setup/supervision/cues(SBA for completion of tasks. )  Toilet Transfer: 4 - Requires steadying assistance only < 25% assist(SBA-CGA)  Primary Mode: Shower  Tub Transfer: 0 - Activity does not occur  Shower Transfer: 0 - Activity does not occur(Pt declined this date. )    Assessment  Performance deficits / Impairments: Decreased functional mobility ; Decreased cognition;Decreased high-level IADLs;Decreased endurance;Decreased ADL status; Decreased balance;Decreased safe awareness;Decreased strength  Prognosis: Good  Discharge Recommendations: Continue to assess pending progress;Home with assist PRN;Home with Home health OT  Activity Tolerance: Patient Tolerated treatment well  Safety Devices in place: Yes  Type of devices: All fall risk precautions in place;Call light within reach; Chair alarm in place; Left in chair(telesitter, SLP entered room for session)        Patient Education:  Patient Education: proper wearing of cardiac heart hugger, safety c transfers, health mgmt technique (med task)  Learner:patient  Method: demonstration and explanation       Outcome: acknowledged understanding of  and needs reinforcement     Plan  Continue current plan of care         05/03/19 0958   OT Individual Minutes   Time In 0835   Time Out 0933   Minutes 62       Electronically signed by VENANCIO Dumont on 5/3/19 at 12:41 PM

## 2019-05-03 NOTE — PROGRESS NOTES
[address] [facility]    ACUTE REHABILITATION OCCUPATIONAL THERAPY  DAILY NOTE    Date: 5/3/19  Patient Name: Celine Yuen      Room: 4193/2305-02    MRN: [id number]   : 1965  (46 y.o.)  Gender: male   Referring Practitioner: Mitch Casillas MD  Diagnosis: Acute CVA, cardiac arrest, CABG  Additional Pertinent Hx: Pt admitted to [address] [place] [address] ED 19 with cardiac arrest and increased SOB. Pt underwent emergent CABG x4 19 with delayed closure on 19. Pt was intubated from 19 to 19. Pt also underwent screw removal from ORIF of R knee on 19 and I&D on the R knee 19. Pt admitted to [facility] for rehabiliation on 19    Restrictions  Restrictions/Precautions: Cardiac, General Precautions, Surgical Protocols, Fall Risk  Sternal Precautions: Emergent CABG X4  Other position/activity restrictions: Fall Risk  Required Braces or Orthoses  Other: Heart Hugger Brace  Right Lower Extremity Brace: Ankle Foot Orthotics  RLE Brace Type: Profo boot and AFO  Left Lower Extremity Brace: Boot  LLE Brace Type: Profo boot  Required Braces or Orthoses?: Yes    Subjective  Subjective: \" My leg hurts today, it's making me a little wobbly\"   Comments: Pt pleasant and cooperative  Patient Currently in Pain: Yes  Pain Level: 4  Pain Location: Leg  Pain Orientation: Right  Restrictions/Precautions: Cardiac;General Precautions;Surgical Protocols; Fall Risk  Overall Orientation Status: Within Functional Limits     Pain Assessment  Pain Level: 4  Pain Location: Leg  Pain Orientation: Right    Objective  Cognition  Overall Cognitive Status: Impaired  Memory: Decreased short term memory  Safety Judgement: Decreased awareness of need for safety  Insights: Decreased awareness of deficits  Sequencing and Organization: Assistance required to identify errors made  Perception  Overall Perceptual Status: Impaired  Initiation: Cues to initiate tasks  Balance  Sitting Balance: Supervision  Standing Balance: Contact guard assistance     Standing Balance  Time: 5 min , 2 min   Activity: Standing for laundry activity   Comment: one LOB             Light Housekeeping  Light Housekeeping Level: Walker  Light Housekeeping Level of Assistance: Contact guard assistance(due to pt experiencing one LOB requring Min a for correcting)  Light Housekeeping: Pt removing clothing from dryer , placing on table top for folding         ADL  Additional Comments: see morning FIM      Instrumental ADL's  Instrumental ADLs: Yes  Light Housekeeping  Light Housekeeping Level: Walker  Light Housekeeping Level of Assistance: Contact guard assistance(due to pt experiencing one LOB requring Min a for correcting)  Light Housekeeping: Pt removing clothing from dryer , placing on table top for folding     OT FIM:      See AM note for FIM              Assessment  Performance deficits / Impairments: Decreased functional mobility ; Decreased cognition;Decreased high-level IADLs;Decreased endurance;Decreased ADL status; Decreased balance;Decreased safe awareness;Decreased strength  Assessment: Pt completed balloon toss seated, remaining within sternal precautions during activtiy    Prognosis: Good  Activity Tolerance: Patient Tolerated treatment well  Safety Devices in place: Yes  Type of devices: Left in bed;Call light within reach; Chair alarm in place          Patient Education:  Patient Goals   Patient goals : to get better  Patient Education: rest breaks, EC, fall prevention   Learner:patient  Method: demonstration and explanation       Outcome: acknowledged understanding      Plan  Plan  Times per week: 5-7x/wk 1.5 hr/day   Times per day: Twice a day  Current Treatment Recommendations: Balance Training, Functional Mobility Training, Endurance Training, Equipment Evaluation, Education, & procurement, Home Management Training, Patient/Caregiver Education & Training, Self-Care / ADL, Safety Education & Training, Pain Management, Strengthening  Patient Goals Patient goals : to get better  Short term goals  Time Frame for Short term goals: in 7 days, pt will  Short term goal 1: perform functional transfers with CGA and LRD  Short term goal 2: perform LB ADL tasks with Min A   Short term goal 3: V/D Good understanding of AE and/or modified/compensatory techniques for increased safety and independence with self care and mobility tasks  Short term goal 4: V/D Good understanding of sternal precautions as they relate to self care and mobility tasks  Short term goal 5: increase standing tolerance to 5-7 minutes with 0-1 UE support during functional task  Short term goal 6: actively participate in 30 min therapeutic exercise/activity to promote increased safety and independence with self care and mobility tasks   Long term goals  Time Frame for Long term goals : by discharge, pt will  Long term goal 1: perform functional transfer and functional mobility with Mod I and LRD  Long term goal 2: perform BADL tasks with Mod I   Long term goal 3: demonstrate Good safety throughout all ADL/mobility tasks  Long term goal 4: perform simple IADL tasks with Mod I        05/03/19 1538   OT Individual Minutes   Time In 1402   Time Out 1433   Minutes 31       Electronically signed by REGINE Blackwood on 5/3/19 at 3:43 PM

## 2019-05-03 NOTE — CARE COORDINATION
Pt stated he feels he needs to go to a snf after discharge. Writer offered him choices of snfs.  Pt stated he would pick out one over the weekend

## 2019-05-03 NOTE — PROGRESS NOTES
Physical Medicine & Rehabilitation  Progress Note    5/3/2019 9:53 AM     CC: Ambulatory and ADL dysfunction due to cardiac arrest, CABG, CVA, infected right lower extremity hardware, metabolic encephalopathy and respiratory failure    Subjective:   Feels well. No complaints. Continues with jsgf-hxxtbw-rbzykxfnxik decrease,  bowels and bladder okay. ROS:  Denies fevers, chills, sweats. No chest pain, palpitations, lightheadedness. Denies coughing, wheezing or shortness of breath. Denies abdominal pain, nausea, diarrhea or constipation. No new areas of joint pain. Denies new areas of numbness or weakness. Denies new anxiety or depression issues. No new skin problems. Rehabilitation:     PT:  Restrictions/Precautions: Cardiac, General Precautions, Surgical Protocols, Fall Risk(telesitter)  Sternal Precautions: Emergent CABG X4  Other position/activity restrictions: Fall Risk  Required Braces or Orthoses  Other: Heart Hugger Brace  Right Lower Extremity Brace: Ankle Foot Orthotics  RLE Brace Type: Profo boot and AFO  Left Lower Extremity Brace: Boot  LLE Brace Type: Profo boot   Transfers  Sit to Stand: Contact guard assistance  Stand to sit: Contact guard assistance  Bed to Chair: Stand by assistance  Stand Pivot Transfers: Contact guard assistance  Comment: Pt used RW. Pt needs verbal cueing for hand placement during transfers with little carry over. At times pt lose balance posteriorly  Ambulation 1  Surface: level tile  Device: Rolling Walker  Other Apparatus: (bilateral PRAFO BOOTs)  Assistance: Contact guard assistance  Quality of Gait: High steppage on right LE to clear floor with right foot drop  Distance: 125 ft x 2  Comments: No shoes today. No prafo boots. Transfers  Sit to Stand: Contact guard assistance  Stand to sit: Contact guard assistance  Bed to Chair: Stand by assistance  Stand Pivot Transfers: Contact guard assistance  Comment: Pt used RW.  Pt needs verbal cueing for hand cm wound  With packing, L leg wounds clean,  clean ,-no changes and no signs or symptoms of infection, right lower extremity wound evaluated again today with nursing-no change, no drainage, mild erythema decreased  PSYCH: appropriately interactive. Affect WNL. Good spirits        Medications   Scheduled Meds:   cephALEXin  500 mg Oral 4 times per day    lisinopril  2.5 mg Oral Daily    furosemide  20 mg Oral Daily    metoprolol tartrate  25 mg Oral BID    insulin glargine  35 Units Subcutaneous Daily    traZODone  50 mg Oral Nightly    enoxaparin  40 mg Subcutaneous Daily    atorvastatin  40 mg Oral Nightly    clopidogrel  75 mg Oral Daily    famotidine  20 mg Oral BID    therapeutic multivitamin-minerals  1 tablet Oral Daily with breakfast    aspirin  81 mg Oral Daily    docusate  100 mg Oral Daily    doxycycline monohydrate  100 mg Oral 2 times per day    insulin lispro  0-12 Units Subcutaneous TID WC    insulin lispro  0-6 Units Subcutaneous Nightly    docusate sodium  100 mg Oral BID    polyethylene glycol  17 g Oral Daily     Continuous Infusions:   dextrose       PRN Meds:.oxyCODONE **OR** [DISCONTINUED] oxyCODONE, dextrose, dextrose, glucagon (rDNA), glucose, magnesium hydroxide, albuterol, bisacodyl     Diagnostics:     CBC:   No results for input(s): WBC, RBC, HGB, HCT, MCV, RDW, PLT in the last 72 hours. BMP:   No results for input(s): NA, K, CL, CO2, PHOS, BUN, CREATININE in the last 72 hours. Invalid input(s): CA  BNP: No results for input(s): BNP in the last 72 hours. PT/INR:   No results for input(s): PROTIME, INR in the last 72 hours. APTT: No results for input(s): APTT in the last 72 hours. CARDIAC ENZYMES: No results for input(s): CKMB, CKMBINDEX, TROPONINT in the last 72 hours.     Invalid input(s): CKTOTAL;3  FASTING LIPID PANEL:  Lab Results   Component Value Date    CHOL 56 04/17/2019    HDL 13 (L) 04/17/2019    TRIG 107 04/17/2019     LIVER PROFILE:   No results for input(s): AST, ALT, ALB, BILIDIR, BILITOT, ALKPHOS in the last 72 hours. I/O (24Hr): Intake/Output Summary (Last 24 hours) at 5/3/2019 0953  Last data filed at 5/3/2019 0452  Gross per 24 hour   Intake --   Output 1500 ml   Net -1500 ml       Glu last 24 hour  Recent Labs     05/02/19  1115 05/02/19  1556 05/02/19  1916 05/03/19  0650   POCGLU 166* 155* 125* 92       No results for input(s): CLARITYU, COLORU, PHUR, SPECGRAV, PROTEINU, RBCUA, BLOODU, BACTERIA, NITRU, WBCUA, LEUKOCYTESUR, YEAST, GLUCOSEU, BILIRUBINUR in the last 72 hours. 5/2/19 venous Dopplers   No evidence of superficial or deep venous thrombosis in both lower   extremities. Other Diagnoses/plan:    1. Ambulatory and ADL dysfunction secondary to cardiac arrest, CABG, CVA, infected right lower extremity hardware, metabolic encephalopathy and respiratory failure-continue rehab efforts , team conference reviewed, discharge plan 5/8-reviewed with patient, agreeable, will most likely need skilled nursing facility due to cognition and limited support  2. Status PTA arrest, emergent cardiac cath, coronary artery disease, myopathy, CABG ×4 on 4/7, BP- , metoprolol,   lisinopril and Lasix resumed appreciate cardiology follow-up  3. Status post respiratory arrest, vent, oxygen, extubated 4/19/19-Proventil,  O2 as needed-doing well off O2, monitor  4. RLE hardware removal /22/19 , chronic leg osteomyelitis, right foot drop-ankle foot orthoses, weightbearing as tolerated, doxycycline  60 days, leukocytosis trending down, continue dressing and packing-wound care follow--noted start of Keflex by ID, erythema decreased, discussed with Dr. Minna Goltz of ID ? follow-up with Dr. Gorge Benavides  next week, picture of right leg wound done to Epic 5/2 ,hopefully off tele-sitter by then  5. miild hyponatremia-improved, monitor  6. Thrombocytosis-monitor, improving  7. Anemia-improving-monitor improving  8.  Diabetes-insulin, sliding scale and glucose as above  9. multiple infarct/?right ICA stenting-aspirin and Plavix, Lipitor follow up with Dr. Sandra Pepper 5/21-decision on stenting at that time  10. Encephalopathy-slept better with trazodone, dc seroquel - did well off Seroquel  11. GI-Ischemic hepatitis more also on Pepcid on Colace, MiraLAX , LFTs-continue to improve  12. S/p  acute kidney injury improved-resolved  13. Pain- oxycodone -begin tapering oxycodone  14. Multiple  wounds, blackened healed, sacral stage II ulcer, leg, arrm-wound care, PRAFO b/l, elevated heels decrease pressure to sacrum-wound care follow =-no signs or symptoms infection, continues on doxycycline and noted addition of Keflex  15. DVTprophylaxis-Lovenox venous Dopplers  Neg 5/2          Ziyad Schroeder MD       This note is created with the assistance of a speech recognition program.  While intending to generate a document that actually reflects the content of the visit, the document can still have some errors including those of syntax and sound a like substitutions which may escape proof reading.   In such instances, actual meaning can be extrapolated by contextual diversion

## 2019-05-03 NOTE — PROGRESS NOTES
[place] [address] Internal Medicine    Progress Note    5/3/2019    3:33 PM    Name:   Jimbo Green  MRN:     [id number]     Acct:      [de-identified]   Room:   [address] [place] [address] Day:  8  Admit Date:  4/25/2019  7:49 PM    PCP:   No primary care provider on file. Code Status:  Full Code    Subjective:     C/C: No chief complaint on file. Interval History Status: improved. HPI:   Patient had a cardiac arrest, found to have multivessel coronary artery disease, had CABG, patient had Stroke with Speech Difficulties   Infected Hard ware was Removed From Right leg     Review of Systems:     Constitutional:  negative for chills, fevers, sweats  Respiratory:  negative for cough, dyspnea on exertion, hemoptysis, shortness of breath, wheezing  Cardiovascular:  negative for chest pain, chest pressure/discomfort, lower extremity edema, palpitations  Gastrointestinal:  negative for abdominal pain, constipation, diarrhea, nausea, vomiting  Neurological:  Speech difficulties, foot drop present in right leg    Medications:      Allergies:  No Known Allergies    Current Meds:   Scheduled Meds:    cephALEXin  500 mg Oral 4 times per day    lisinopril  2.5 mg Oral Daily    furosemide  20 mg Oral Daily    metoprolol tartrate  25 mg Oral BID    insulin glargine  35 Units Subcutaneous Daily    traZODone  50 mg Oral Nightly    enoxaparin  40 mg Subcutaneous Daily    atorvastatin  40 mg Oral Nightly    clopidogrel  75 mg Oral Daily    famotidine  20 mg Oral BID    therapeutic multivitamin-minerals  1 tablet Oral Daily with breakfast    aspirin  81 mg Oral Daily    docusate  100 mg Oral Daily    doxycycline monohydrate  100 mg Oral 2 times per day    insulin lispro  0-12 Units Subcutaneous TID WC    insulin lispro  0-6 Units Subcutaneous Nightly    docusate sodium  100 mg Oral BID    polyethylene glycol  17 g Oral Daily     Continuous Infusions:    dextrose       PRN Meds: oxyCODONE **OR** [DISCONTINUED] oxyCODONE, dextrose, dextrose, glucagon (rDNA), glucose, magnesium hydroxide, albuterol, bisacodyl    Data:     Past Medical History:   has a past medical history of CAD (coronary artery disease), Cardiac arrest ([facility] Osteolysis, PEA (Pulseless electrical activity) ([facility] and [facility]peripheral vascular disease) [facility] Social History:   reports that he has quit smoking. He does not have any smokeless tobacco history on file. Family History: No family history on file. Vitals:  /70   Pulse 94   Temp 97.7 °F (36.5 °C) (Oral)   Resp 16   Ht 5' 8.11\" (1.73 m)   Wt 134 lb (60.8 kg)   SpO2 98%   BMI 20.31 kg/m²   Temp (24hrs), Av.8 °F (36.6 °C), Min:97.7 °F (36.5 °C), Max:97.9 °F (36.6 °C)    Recent Labs     19  1556 19  1916 19  0650 19  1052   POCGLU 155* 125* 92 191*       I/O (24Hr): Intake/Output Summary (Last 24 hours) at 5/3/2019 1533  Last data filed at 5/3/2019 0452  Gross per 24 hour   Intake --   Output 1500 ml   Net -1500 ml       Labs:    [unfilled]    Lab Results   Component Value Date/Time    SPECIAL NOT REPORTED 2019 04:08 PM     Lab Results   Component Value Date/Time    CULTURE (A) 2019 04:08 PM     STAPHYLOCOCCUS SPECIES, COAGULASE NEGATIVE SCANT GROWTH Susceptibilities have not been performed. Notify the laboratory within 7 days for further workup if clinically indicated.     CULTURE NO ANAEROBIC ORGANISMS ISOLATED AT 5 DAYS (A) 2019 04:08 PM       [unfilled]    Radiology:      Physical Examination:        General appearance:  alert, cooperative and no distress  Mental Status:  oriented to person, place and time and normal affect  Lungs:  clear to auscultation bilaterally, normal effort  Heart:  regular rate and rhythm, no murmur  Abdomen:  soft, nontender, nondistended, normal bowel sounds, no masses, hepatomegaly, splenomegaly  Extremities: Dressing present and right leg , Splint present   Skin:  no gross lesions, rashes, induration    Assessment:        Primary Problem  <principal problem not specified>    Active Hospital Problems    Diagnosis Date Noted    Cellulitis of right lower extremity [V24.728]     Moderate malnutrition ([facility] [E44.0] 04/26/2019    [facility] [I63.9] 04/25/2019    Acute cerebral infarction associated with systemic hypoxia or ischemia [I63.89]     Subacute osteomyelitis of right tibia [facility] [V10.890]        Plan:        1. Cardiac arrest, status post CABG with multivessel coronary artery disease  2. Acute stroke. Speech difficulties, patient is on aspirin, Lipitor, Plavix  3. Congestive heart failure, both systolic and diastolic, ejection fraction is 40%, patient is on Lasix, Aldactone, lisinopril, Lopressor  4. Infected implant removed from right leg, she was on doxycycline  5. Diabetes, controlled, on sliding scale and long-acting insulin  6.  On DVT prophylaxis  7.     4/28/19  Doing better   BP controlled   Pain Better   Advising Carb Control diet     4/29   Has Readings of Low Blood Pressure   Hyponatremia   Seen By cardiologist   Diuretics and Antihypertensives on Hold   High Blood sugars   Not Compliant with Diet   Lantus to 35     4/30   Patient blood sugar is better  Started on Lopressor, will resume Lasix from tomorrow, blood pressure is better controlled  Wound on right leg is healthy    5/1  Patient doing better,  Liver enzymes are  improvement  Blood sugars better controlled  5/2  BP Better Controlled   Blood sugars Controlled   5/3  Patient BP is Controlled   DM - Controlled   Added Keflex per ID       Atilio Parada MD  5/3/2019  3:33 PM

## 2019-05-03 NOTE — PLAN OF CARE
Problem: Risk for Impaired Skin Integrity  Goal: Tissue integrity - skin and mucous membranes  Description  Structural intactness and normal physiological function of skin and  mucous membranes. 5/3/2019 4693 by Shoaib Atkins RN  Outcome: Ongoing  Note:   Pt skin integrity remained intact, no new alterations noted. Head to toe completed, see chart assessment. 5/2/2019 1949 by Yaron Moncada RN  Outcome: Ongoing     Problem: Falls - Risk of:  Goal: Will remain free from falls  Description  Will remain free from falls  5/3/2019 8594 by Shoaib Atkins RN  Outcome: Ongoing  Note:   Call light in reach, 2 top side rails are up. Bed is locked and in lowest position. Uses call light appropriately. Safety maintained and will continue to monitor. No attempt to get out of bed unassisted. 5/2/2019 1949 by Yaron Moncada RN  Outcome: Ongoing  Goal: Absence of physical injury  Description  Absence of physical injury  5/3/2019 3614 by Shoaib Atkins RN  Outcome: Ongoing  Note:   No physical injury occurance during shift.  Will continue to monitor and care for pt.   5/2/2019 1949 by Yaron Moncada RN  Outcome: Ongoing     Problem: Pain:  Goal: Pain level will decrease  Description  Pain level will decrease  5/3/2019 8892 by Shoaib Atkins RN  Outcome: Ongoing  5/2/2019 1949 by Yaron Moncada RN  Outcome: Ongoing  Goal: Control of acute pain  Description  Control of acute pain  5/3/2019 4679 by Sohaib Atkins RN  Outcome: Ongoing  5/2/2019 1949 by Yaron Moncada RN  Outcome: Ongoing  Goal: Control of chronic pain  Description  Control of chronic pain  5/3/2019 5605 by Shoaib Atkins RN  Outcome: Ongoing  5/2/2019 1949 by Yaron Moncada RN  Outcome: Ongoing     Problem: Nutrition  Goal: Optimal nutrition therapy  5/3/2019 4206 by Shoaib Atkins RN  Outcome: Ongoing  5/2/2019 1949 by Yaron Moncada RN  Outcome: Ongoing

## 2019-05-03 NOTE — PROGRESS NOTES
[facility]   ACUTE REHABILITATION  LUNCH GROUP NOTE    Date: 5/3/19  Patient Name: Millicent Bence      Room: 6696/1371-72        MRN: [id number]    : 1965  (46 y.o.)  Gender: male   Referring Practitioner: Dr. Latrell Zamorano  Diagnosis: Cardiac Arrest and stroke    The Patient participated in the OT Meal Program at  in the [address] [place] [address] on this date. They were in group for 45 minutes     The patient  initiated conversation. They expressed  enjoyment in the setting and people. The patient's pain was monitored as they were  able to tolerate Lunch Group. Pain level was 0/10. No noted or observed pain. They were Independent to feed themselves.    FIM Score: Eatin - Feeds self with adaptive equipment/dentures and/or feeds self with modified diet and/or performs own tube feeding     19 1237   OT Group Minutes   Time In 1115   Time Out 1200   Minutes [address] [place] [address]

## 2019-05-04 LAB
ALBUMIN SERPL-MCNC: 3.1 G/DL (ref 3.5–5.2)
ALBUMIN/GLOBULIN RATIO: ABNORMAL (ref 1–2.5)
ALP BLD-CCNC: 132 U/L (ref 40–129)
ALT SERPL-CCNC: 43 U/L (ref 5–41)
ANION GAP SERPL CALCULATED.3IONS-SCNC: 9 MMOL/L (ref 9–17)
AST SERPL-CCNC: 26 U/L
BILIRUB SERPL-MCNC: 0.61 MG/DL (ref 0.3–1.2)
BUN BLDV-MCNC: 16 MG/DL (ref 6–20)
BUN/CREAT BLD: ABNORMAL (ref 9–20)
CALCIUM SERPL-MCNC: 9 MG/DL (ref 8.6–10.4)
CHLORIDE BLD-SCNC: 99 MMOL/L (ref 98–107)
CO2: 27 MMOL/L (ref 20–31)
CREAT SERPL-MCNC: 0.48 MG/DL (ref 0.7–1.2)
GFR AFRICAN AMERICAN: >60 ML/MIN
GFR NON-AFRICAN AMERICAN: >60 ML/MIN
GFR SERPL CREATININE-BSD FRML MDRD: ABNORMAL ML/MIN/{1.73_M2}
GFR SERPL CREATININE-BSD FRML MDRD: ABNORMAL ML/MIN/{1.73_M2}
GLUCOSE BLD-MCNC: 101 MG/DL (ref 75–110)
GLUCOSE BLD-MCNC: 190 MG/DL (ref 75–110)
GLUCOSE BLD-MCNC: 204 MG/DL (ref 75–110)
GLUCOSE BLD-MCNC: 85 MG/DL (ref 75–110)
GLUCOSE BLD-MCNC: 89 MG/DL (ref 70–99)
HCT VFR BLD CALC: 34 % (ref 41–53)
HEMOGLOBIN: 11 G/DL (ref 13.5–17.5)
MCH RBC QN AUTO: 29.2 PG (ref 26–34)
MCHC RBC AUTO-ENTMCNC: 32.2 G/DL (ref 31–37)
MCV RBC AUTO: 90.5 FL (ref 80–100)
NRBC AUTOMATED: ABNORMAL PER 100 WBC
PDW BLD-RTO: 15 % (ref 11.5–14.9)
PLATELET # BLD: 448 K/UL (ref 150–450)
PMV BLD AUTO: 7.8 FL (ref 6–12)
POTASSIUM SERPL-SCNC: 4.4 MMOL/L (ref 3.7–5.3)
RBC # BLD: 3.76 M/UL (ref 4.5–5.9)
SODIUM BLD-SCNC: 135 MMOL/L (ref 135–144)
TOTAL PROTEIN: 7.2 G/DL (ref 6.4–8.3)
WBC # BLD: 10.1 K/UL (ref 3.5–11)

## 2019-05-04 PROCEDURE — 6370000000 HC RX 637 (ALT 250 FOR IP): Performed by: INTERNAL MEDICINE

## 2019-05-04 PROCEDURE — 99232 SBSQ HOSP IP/OBS MODERATE 35: CPT | Performed by: INTERNAL MEDICINE

## 2019-05-04 PROCEDURE — 97530 THERAPEUTIC ACTIVITIES: CPT

## 2019-05-04 PROCEDURE — 6370000000 HC RX 637 (ALT 250 FOR IP): Performed by: STUDENT IN AN ORGANIZED HEALTH CARE EDUCATION/TRAINING PROGRAM

## 2019-05-04 PROCEDURE — 6370000000 HC RX 637 (ALT 250 FOR IP): Performed by: NURSE PRACTITIONER

## 2019-05-04 PROCEDURE — 80053 COMPREHEN METABOLIC PANEL: CPT

## 2019-05-04 PROCEDURE — 6370000000 HC RX 637 (ALT 250 FOR IP): Performed by: PHYSICAL MEDICINE & REHABILITATION

## 2019-05-04 PROCEDURE — 97116 GAIT TRAINING THERAPY: CPT

## 2019-05-04 PROCEDURE — 97535 SELF CARE MNGMENT TRAINING: CPT

## 2019-05-04 PROCEDURE — 97110 THERAPEUTIC EXERCISES: CPT

## 2019-05-04 PROCEDURE — 6360000002 HC RX W HCPCS: Performed by: NURSE PRACTITIONER

## 2019-05-04 PROCEDURE — 82947 ASSAY GLUCOSE BLOOD QUANT: CPT

## 2019-05-04 PROCEDURE — 85027 COMPLETE CBC AUTOMATED: CPT

## 2019-05-04 PROCEDURE — 1180000000 HC REHAB R&B

## 2019-05-04 PROCEDURE — 97127 HC OT THER IVNTJ W/FOCUS COG FUNCJ: CPT

## 2019-05-04 PROCEDURE — 99231 SBSQ HOSP IP/OBS SF/LOW 25: CPT | Performed by: INTERNAL MEDICINE

## 2019-05-04 PROCEDURE — 36415 COLL VENOUS BLD VENIPUNCTURE: CPT

## 2019-05-04 RX ADMIN — INSULIN LISPRO 4 UNITS: 100 INJECTION, SOLUTION INTRAVENOUS; SUBCUTANEOUS at 12:01

## 2019-05-04 RX ADMIN — CLOPIDOGREL BISULFATE 75 MG: 75 TABLET ORAL at 08:20

## 2019-05-04 RX ADMIN — CEPHALEXIN 500 MG: 500 CAPSULE ORAL at 06:01

## 2019-05-04 RX ADMIN — FAMOTIDINE 20 MG: 20 TABLET ORAL at 08:21

## 2019-05-04 RX ADMIN — METOPROLOL TARTRATE 25 MG: 25 TABLET ORAL at 08:20

## 2019-05-04 RX ADMIN — DOCUSATE SODIUM 100 MG: 100 CAPSULE, LIQUID FILLED ORAL at 22:29

## 2019-05-04 RX ADMIN — OXYCODONE HYDROCHLORIDE 5 MG: 5 TABLET ORAL at 22:29

## 2019-05-04 RX ADMIN — TRAZODONE HYDROCHLORIDE 50 MG: 50 TABLET ORAL at 22:30

## 2019-05-04 RX ADMIN — CEPHALEXIN 500 MG: 500 CAPSULE ORAL at 01:21

## 2019-05-04 RX ADMIN — MULTIPLE VITAMINS W/ MINERALS TAB 1 TABLET: TAB at 08:20

## 2019-05-04 RX ADMIN — CEPHALEXIN 500 MG: 500 CAPSULE ORAL at 18:02

## 2019-05-04 RX ADMIN — ASPIRIN 81 MG 81 MG: 81 TABLET ORAL at 08:20

## 2019-05-04 RX ADMIN — METOPROLOL TARTRATE 25 MG: 25 TABLET ORAL at 22:30

## 2019-05-04 RX ADMIN — CEPHALEXIN 500 MG: 500 CAPSULE ORAL at 22:29

## 2019-05-04 RX ADMIN — ENOXAPARIN SODIUM 40 MG: 100 INJECTION SUBCUTANEOUS at 08:20

## 2019-05-04 RX ADMIN — INSULIN GLARGINE 35 UNITS: 100 INJECTION, SOLUTION SUBCUTANEOUS at 12:01

## 2019-05-04 RX ADMIN — FAMOTIDINE 20 MG: 20 TABLET ORAL at 22:29

## 2019-05-04 RX ADMIN — ATORVASTATIN CALCIUM 40 MG: 40 TABLET, FILM COATED ORAL at 22:29

## 2019-05-04 RX ADMIN — DOCUSATE SODIUM 100 MG: 100 CAPSULE, LIQUID FILLED ORAL at 08:21

## 2019-05-04 RX ADMIN — DOXYCYCLINE 100 MG: 100 CAPSULE ORAL at 08:20

## 2019-05-04 RX ADMIN — LISINOPRIL 2.5 MG: 5 TABLET ORAL at 08:21

## 2019-05-04 RX ADMIN — FUROSEMIDE 20 MG: 20 TABLET ORAL at 08:20

## 2019-05-04 RX ADMIN — INSULIN LISPRO 1 UNITS: 100 INJECTION, SOLUTION INTRAVENOUS; SUBCUTANEOUS at 22:32

## 2019-05-04 RX ADMIN — DOXYCYCLINE 100 MG: 100 CAPSULE ORAL at 22:29

## 2019-05-04 RX ADMIN — POLYETHYLENE GLYCOL 3350 17 G: 17 POWDER, FOR SOLUTION ORAL at 08:20

## 2019-05-04 RX ADMIN — CEPHALEXIN 500 MG: 500 CAPSULE ORAL at 12:02

## 2019-05-04 ASSESSMENT — PAIN DESCRIPTION - PAIN TYPE
TYPE: SURGICAL PAIN
TYPE: ACUTE PAIN

## 2019-05-04 ASSESSMENT — PAIN DESCRIPTION - ORIENTATION
ORIENTATION: RIGHT
ORIENTATION: RIGHT

## 2019-05-04 ASSESSMENT — PAIN DESCRIPTION - LOCATION
LOCATION: LEG
LOCATION: LEG

## 2019-05-04 ASSESSMENT — PAIN SCALES - GENERAL
PAINLEVEL_OUTOF10: 7
PAINLEVEL_OUTOF10: 2
PAINLEVEL_OUTOF10: 0
PAINLEVEL_OUTOF10: 4

## 2019-05-04 ASSESSMENT — PAIN DESCRIPTION - ONSET: ONSET: ON-GOING

## 2019-05-04 ASSESSMENT — PAIN DESCRIPTION - DESCRIPTORS
DESCRIPTORS: ACHING
DESCRIPTORS: TIGHTNESS

## 2019-05-04 ASSESSMENT — PAIN DESCRIPTION - FREQUENCY: FREQUENCY: INTERMITTENT

## 2019-05-04 NOTE — PROGRESS NOTES
Physical Therapy  [address]   Acute Rehabilitation Physical Therapy Progress Note    Date: 19  Patient Name: Jennifer Murray       Room: 1286/8517-49  MRN: [id number]   Account: [de-identified]   : 1965  (46 y.o.) Gender: male     Referring Practitioner: Scarlett Gonzalez MD  Diagnosis: Acute CVA, cardiac arrest, CABG  Past Medical History:  has a past medical history of CAD (coronary artery disease), Cardiac arrest ([facility] Osteolysis, PEA (Pulseless electrical activity) ([facility] and PVD (peripheral vascular disease) ([facility] Past Surgical History:   has a past surgical history that includes Cardiac catheterization (2019); Femur Surgery; Tonsillectomy; Coronary artery bypass graft (2019); Hardware Removal (Right, 2019); Tibia fracture surgery (Right); Coronary artery bypass graft (N/A, 2019); RECONSTRUCTIVE REPAIR STERNAL (N/A, 2019); Hardware Removal (Right, 2019); and REMOVE HARDWARE FEMUR (N/A, 2019). Additional Pertinent Hx: Pt admitted to [facility] ED 19 presenting with cardiac arrest and increased SOB. Pt underwent emergent CABG x4 19 with delayed closure on 19. Pt was intubated from 19 to 19. Pt also underwent screw removal from ORIF of R knee bedside on 19. Hardware removal of R tibia and I&D on 19. Pt admitted to [address] [place] [address] for intense rehabiliation on 19. 19 MRI shows R cerebellum infarct. CT on 19 shows Lower parietal infarct. Restrictions/Precautions  Restrictions/Precautions: Cardiac;General Precautions;Surgical Protocols; Fall Risk(telesitter)  Required Braces or Orthoses?: Yes(R AFO, R procare boot)  Required Braces or Orthoses  Right Lower Extremity Brace: Ankle Foot Orthotics  RLE Brace Type: Profo boot and AFO  Left Lower Extremity Brace: Boot  LLE Brace Type: Profo boot  Position Activity Restriction  Sternal Precautions: No Pushing; No Pulling;5# Lifting Restrictions  Sternal Precautions: Emergent CABG X4  Other position/activity restrictions: Fall Risk    Subjective: Pt c/o of L calf pain in a.m.; relieved enough in afternoon to tolerate NuStep after c/o pain too intense to continue in a.m. Vital Signs  Pulse: 77(after amb)  BP: 110/70(a.m., after amb)  BP Location: Right upper arm  Patient Position: Sitting  Patient Currently in Pain: Yes  Pain Assessment: 0-10  Pain Level: 7  Pain Type: Acute pain  Pain Location: Leg(calf)  Pain Orientation: Right  Pain Descriptors: Tightness(\"pulling\")  Non-Pharmaceutical Pain Intervention(s): Repositioned;Rest;Emotional support; Ambulation/Increased Activity  Response to Pain Intervention: Patient Satisfied     Oxygen Therapy  SpO2: 100 %(after amb; 94% after steps)  Patient Observation  Observations: Vitals WNL throughout tx. Bed Mobility  Bridging: Stand by assistance(Pt c/o chest pain; exercise aborted after 2 reps)  Supine to Sit: Supervision  Sit to Supine: Supervision  Scooting: Supervision  Comment: Mat, wedge, 3 pillows    Transfers:  Sit to Stand: Contact guard assistance(Cues for hand position with fair return, recall)  Stand to sit: Contact guard assistance(Cues for hand position with fair return, recall)  Stand Pivot Transfers: Contact guard assistance(RW)     Ambulation 1  Surface: level tile  Device: Rolling Walker  Other Apparatus: Right;Dorsiflex Assist  Assistance: Contact guard assistance;Stand by assistance  Quality of Gait: High steppage on right LE to clear floor with right foot drop  Distance: 200 ft a.m. & p.m.; short distances within gym. Comments: Patient had tennis shoes with amb. today. Vitals WNL post amb.      Stairs/Curb  Stairs?: Yes  Stairs  # Steps : 15(+ 9 8\" steps, B HR ascending, L HR descending)  Stairs Height: (4\"/6\")  Rails: Bilateral  Device: No Device  Other Apparatus: Right;Dorsiflexon assist  Assistance: Stand by assistance;Contact guard assistance  Comment: Good sequencing demo'd today with pt able to verbalize correct sequencing before attempt. Vitals WNL post steps     FIMS:  TRANSFERS  Bed, Chair, Wheel Chair: 4 - Requires steadying assistance only <25% assist  and/or requires assist with one leg only   LOCOMOTION  Primary Mode: Walk  Distance Walked: 200'(RW + R DF assist)  Walk: 4 - Contact Guard/Minimal Assistance Requires up to Contact Guard or Minimal Assistance to walk at least 150 feet  Stairs: 4- Minimal Contact Assistance Perfoms 75% or more of the effort to go up and down one flight of stairs(15 4\"/6\" steps, B HRs, R DF assist)     BALANCE Posture: Fair  Sitting - Static: Good(EOM, no back or UE support)  Sitting - Dynamic: Good(EOM, no back or UE support)  Standing - Static: Fair;+(RW)  Standing - Dynamic: Fair(RW)    EXERCISES    Other exercises?: Yes  Other exercises 1: Supine (B) LE ex x20 or greater (to pt tolerance); 2# (B)(Inc. B posterior chain stretch with bolster at ankles)  Other exercises 2: Nustep x 10 min L4, p.m.(In p.m.; pt unable to tolerate in a.m. 2* R calf pain)  Other exercises 6: UBE x 10 min f/b  Other exercises 7: Education, demo of R calf stretch using gait belt/leg ; reinforcement needed.       Activity Tolerance: Patient limited by endurance, Patient limited by fatigue  PT Equipment Recommendations  Equipment Needed: (TBD)  Other: TBD     Patient Education  New Education Provided: Stretching  Learner:patient  Method: demonstration and explanation       Outcome: acknowledged understanding of and needs reinforcement     Current Treatment Recommendations: ROM, Strengthening, Functional Mobility Training, Endurance Training, Transfer Training, Gait Training, Stair training, Balance Training, Neuromuscular Re-education, Home Exercise Program, Safety Education & Training, Patient/Caregiver Education & Training, Equipment Evaluation, Education, & procurement    Conditions Requiring Skilled Therapeutic Intervention  Body structures, Functions, Activity limitations: Decreased functional mobility

## 2019-05-04 NOTE — PROGRESS NOTES
ON PUMP ,CHEST LEFT OPEN; SWAN, ANGELA PER ANESTHESIA performed by Bobbi Cruz MD at [address] [facility]      unsure if correct known tibial surgery    HARDWARE REMOVAL Right 04/07/2019    R Knee internal screw protruding through skin    HARDWARE REMOVAL Right 04/22/2019    HARDWARE REMOVAL RIGHT TIBIA,    RECONSTRUCTIVE REPAIR STERNAL N/A 4/8/2019    STERNAL  WOUND WASHOUT AND CLOSURE S/P CABG performed by Bobbi Cruz MD at [address] [facility] N/A 4/22/2019    HARDWARE REMOVAL RIGHT TIBIA, IRRIGATION AND DEBRIDEMENT [address] performed by Latasha Bryant MD at [address] [facility] Right     [address] off roof, external fixation and internal hardware    TONSILLECTOMY            Admission Meds  No current facility-administered medications on file prior to encounter. No current outpatient medications on file prior to encounter. Allergies  No Known Allergies     Social   Social History     Tobacco Use    Smoking status: Former Smoker   Substance Use Topics    Alcohol use: Not on file                No family history on file. Review of Systems  No fever / chills / sweats. No weight loss. No visual change, eye pain, eye discharge. No oral lesion, sore throat, dysphagia. Denies cough / sputum. Denies chest pain, palpitations. Denies n / v / abd pain. No diarrhea. Denies dysuria or change in urinary function. Other than above 14 systems reviewed were negative . Tolerating antibiotics.          Physical Exam  /65   Pulse 89   Temp 97.9 °F (36.6 °C) (Oral)   Resp 15   Ht 5' 8.11\" (1.73 m)   Wt 134 lb (60.8 kg)   SpO2 99%   BMI 20.31 kg/m²           General Appearance: alert and oriented to person, place and time, well-developed and well-nourished, in no acute distress  Skin: warm and dry, no rash   Head: normocephalic and atraumatic  Eyes: pupils equal, round, and reactive to light, extraocular eye movements intact, conjunctivae normal  ENT: hearing grossly normal bilaterally  Neck: neck supple and non tender without mass  Pulmonary/Chest: clear to auscultation bilaterally- no wheezes, rales or rhonchi, normal air movement, no respiratory distress  Cardiovascular: normal rate, regular rhythm, normal S1 and S2, no murmurs. Abdomen: soft, non-tender, non-distended, normal bowel sounds, no masses or organomegaly  Extremities: no cyanosis, clubbing  Right calf area of erythema, induration and tenderness,no fluctuation or crepitations. Surgical incision below her right knee with mild surrounding erythema, no purulent drainage no crepitance no fluctuation. Musculoskeletal: normal range of motion, no joint swelling, deformity or tenderness      Data Review:        Imaging Studies:                           All appropriate imaging studies and reports reviewed: Yes       Xr Femur Right (min 2 Views)    Result Date: 4/7/2019  EXAMINATION: 2 XRAY VIEWS OF THE RIGHT FEMUR 4/7/2019 3:28 pm COMPARISON: None. HISTORY: ORDERING SYSTEM PROVIDED HISTORY: Trauma/Fracture TECHNOLOGIST PROVIDED HISTORY: Trauma/Fracture FINDINGS: The femur appears intact. The hip and knee are in anatomic alignment. Hardware is noted in the proximal tibia. Soft tissues are unremarkable. No fracture     Xr Knee Right (3 Views)    Result Date: 4/7/2019  EXAMINATION: 3 XRAY VIEWS OF THE RIGHT KNEE 4/7/2019 5:00 pm COMPARISON: 04/07/2019 HISTORY: ORDERING SYSTEM PROVIDED HISTORY: s/p Hardware removal at bedside TECHNOLOGIST PROVIDED HISTORY: s/p Hardware removal at bedside FINDINGS: Interval removal of anterior superior fixating screw. No periprosthetic fracture. Chronic deformity of the proximal fibula. Degenerative changes of knee and tib-fib syndesmosis. Removal of anterior-superior fixating screw without interval change otherwise.      Xr Knee Right (3 Views)    Result Date: 4/7/2019  EXAMINATION: 3 XRAY VIEWS OF THE RIGHT KNEE 4/7/2019 3:28 pm COMPARISON: None. HISTORY: ORDERING SYSTEM PROVIDED HISTORY: Trauma/Fracture TECHNOLOGIST PROVIDED HISTORY: Trauma/Fracture FINDINGS: Sideplate and 4 screws transfix the proximal tibia laterally. Old healed fracture proximal fibular neck and head. Spurring lateral tibia plateau. Proximal tibiofibular syndesmosis. Visualized femur and patella normal. Soft tissues unremarkable. ORIF proximal tibia. No acute fracture. Xr Tibia Fibula Right (2 Views)    Result Date: 4/22/2019  Radiology exam is complete. No Radiologist dictation. Please follow up with ordering provider. Ct Head Wo Contrast    Result Date: 4/13/2019  EXAMINATION: CT OF THE HEAD WITHOUT CONTRAST  4/13/2019 4:29 pm TECHNIQUE: CT of the head was performed without the administration of intravenous contrast. Dose modulation, iterative reconstruction, and/or weight based adjustment of the mA/kV was utilized to reduce the radiation dose to as low as reasonably achievable. COMPARISON: 04/12/2019 HISTORY: ORDERING SYSTEM PROVIDED HISTORY: f/u ct, s/p cardiac arrest, ? attenuation in L parietal lobe TECHNOLOGIST PROVIDED HISTORY: Ordering Physician Provided Reason for Exam: f/u ct, s/p cardiac arrest, ? attenuation in L parietal lobe Acuity: Unknown Type of Exam: Unknown FINDINGS: BRAIN/VENTRICLES: There is no evidence of acute infarct or hemorrhage. Previously suspected left parietal hypodensity is better seen on the current study to represent partial volume averaging of a sulcus. Bilateral anterior frontal and high left frontoparietal encephalomalacia is re- demonstrated. There is no hydrocephalus or extra-axial fluid collection. Midline is maintained. Basal cisterns are patent. ORBITS: The visualized portion of the orbits demonstrate no acute abnormality. SINUSES: Diffuse paranasal sinus mucoperiosteal thickening. Trace bilateral mastoid effusions. These findings may relate to intubation.  SOFT TISSUES/SKULL:  No acute abnormality of the visualized skull or soft tissues. 1. No acute intracranial abnormality. 2. Stable multifocal encephalomalacia. Ct Head Wo Contrast    Result Date: 4/12/2019  EXAMINATION: CT OF THE HEAD WITHOUT CONTRAST  4/12/2019 10:25 am TECHNIQUE: CT of the head was performed without the administration of intravenous contrast. Dose modulation, iterative reconstruction, and/or weight based adjustment of the mA/kV was utilized to reduce the radiation dose to as low as reasonably achievable. COMPARISON: None. HISTORY: ORDERING SYSTEM PROVIDED HISTORY: NEURO DEFICIT(S), SUBACUTE, PROGRESSIVE OR FLUCTUATING TECHNOLOGIST PROVIDED HISTORY: Ordering Physician Provided Reason for Exam: History of Present Illness: This is a 46 y.o. male with unknown medical history admitted for management of cardiac arrest and STEMI. Patient initially called EMS for worsening SOB, placed on CPAP, given breathing treatments, solumedrol, aspirin, transport initiated to [facility]. EKG done by EMS showed possible anterior septal STEMI. On arrival to [facility] ED, pulses were lost. ACLS protocol initiated, CPR initiated patient intubated. EKG showed PEA. Three rounds CPR with epi given, had return of pulses. Bedside ECHO at that time showed globally decreased systolic contraction. Cardiology consulted, agreed for cath. 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 Right iliac a also noted to be occluded. FINDINGS: BRAIN/VENTRICLES: There is no acute intracranial hemorrhage, mass effect or midline shift. No abnormal extra-axial fluid collection. The gray-white differentiation is maintained without evidence of an acute infarct. There is no evidence of hydrocephalus. Encephalomalacia is present in the left frontal lobe consistent with remote infarct. Punctate density is noted along the encephalomalacia which may represent an area of dystrophic calcification.   An additional area of encephalomalacia is noted in the high left parietal lobe. An area of encephalomalacia is also present in the right frontal lobe, and there is hypodensity in the left posterior parietal lobe which may represent subacute to chronic infarct. Scattered hypodensities are present in the white matter consistent with chronic microvascular change. Vertebral and internal carotid arteries are calcified. Small extradural calcification is noted near the vertex, right side. This may represent a small meningioma without mass effect of 8 mm. ORBITS: The visualized portion of the orbits demonstrate no acute abnormality. SINUSES: Moderate mucoperiosteal thickening is noted in the maxillary and sphenoid sinuses. Frontal sinuses are hypoplastic. Significant mucoperiosteal thickening is noted in the ethmoid air cells. Mastoid air cells are appropriately aerated. SOFT TISSUES/SKULL:  No acute abnormality of the visualized skull or soft tissues. Estimated biologic radiation dose for this procedure:1059.07 mGy/cm2. Scattered areas of encephalomalacia, bifrontal and high left parietal areas consistent with remote infarct. There is hypodensity in the left lower parietal region possibly a subacute infarct. Chronic microvascular changes are noted. A punctate density is noted adjacent to the encephalomalacia in the left frontal lobe, favoring dystrophic calcification over petechial hemorrhage. RECOMMENDATIONS: MRI imaging of the brain is suggested given differing ages of the ischemic changes in the patient's history. Nm Hepatobiliary    Result Date: 4/15/2019  EXAMINATION: NUCLEAR MEDICINE HEPATOBILIARY SCINTIGRAPHY (HIDA SCAN). 4/15/2019 8:28 am TECHNIQUE: Approximately 3 [id number] Mebrofenin (Choletec) was administered IV. Then, dynamic images of the abdomen were obtained in the anterior projection for 60 mins. COMPARISON: Renal ultrasound April 12, 2019.  HISTORY: ORDERING SYSTEM PROVIDED HISTORY: CHOLECYSTITIS TECHNOLOGIST PROVIDED HISTORY: Ordering Physician Provided Reason for Exam: Thickened GB wall·Hepatitis  - possible amiodarone, GB w sludge FINDINGS: Prompt, homogenous uptake by the liver is noted with normal appearance of radiotracer excretion into the biliary system. Clearance of bloodpool activity appears appropriate. Gallbladder and small bowel is visualized in appropriate sequence. No scintigraphic evidence of biliary obstruction. Us Renal Complete    Result Date: 4/13/2019  EXAMINATION: RETROPERITONEAL ULTRASOUND OF THE KIDNEYS 4/12/2019 COMPARISON: None HISTORY: ORDERING SYSTEM PROVIDED HISTORY: RENAL FAILURE, ACUTE (KIDNEY INJURY) TECHNOLOGIST PROVIDED HISTORY: increased creatinine FINDINGS: Kidneys: The right kidney measures 12.5 cm in length and the left kidney measures 12.6 cm in length. Kidneys demonstrate normal cortical echogenicity. No evidence of hydronephrosis or intrarenal stones. There is 1.1 x 1.0 x 0.8 cm left renal cyst. Other: Partially imaged gallbladder sludge. Apparent gallbladder wall thickening. 1. No evidence of hydronephrosis. Normal renal cortical echogenicity. 2. Partially imaged gallbladder sludge. Apparent gallbladder wall thickening. Consider further evaluation with dedicated right upper quadrant ultrasound. Xr Chest Portable    Result Date: 4/19/2019  EXAMINATION: SINGLE XRAY VIEW OF THE CHEST 4/19/2019 6:38 am COMPARISON: April 17, 2019 HISTORY: ORDERING SYSTEM PROVIDED HISTORY: intubated TECHNOLOGIST PROVIDED HISTORY: intubated FINDINGS: Endotracheal tube with the tip just below the level of the clavicles. Enteric tube with the tip within the stomach. Partial clearing at the right lung base. Persistent left lung base opacities. Sternotomy. Cardiomegaly. Skin staples. No definite pulmonary edema. Partial clearing at the right lung base. Persistent left lung base opacity.      Xr Chest Portable    Result Date: 4/17/2019  EXAMINATION: SINGLE XRAY VIEW OF THE CHEST 4/17/2019 10:13 am COMPARISON: April 15, 2019 HISTORY: ORDERING SYSTEM PROVIDED HISTORY: possible pneumonia TECHNOLOGIST PROVIDED HISTORY: possible pneumonia Ordering Physician Provided Reason for Exam: possible pneumonia. upright portable. FINDINGS: ET tube in good position. Enteric tube passes beneath the diaphragm. Intact sternotomy wires. Cardiac leads overlie the chest.  Enlarged heart with moderate pulmonary edema. Bilateral pleural effusions. No pneumothorax. Stable moderate pulmonary edema and effusions. Stable supporting devices. Xr Chest Portable    Result Date: 4/15/2019  EXAMINATION: SINGLE XRAY VIEW OF THE CHEST 4/15/2019 6:15 am COMPARISON: 04/14/2019, 531 hours HISTORY: ORDERING SYSTEM PROVIDED HISTORY: intubated, s/p [facility] TECHNOLOGIST PROVIDED HISTORY: intubated, s/p [facility] 59-year-old male who is intubated status post open heart surgery FINDINGS: Portable supine view of the chest. Endotracheal tube distal tip overlying the mid trachea approximately 3.6 cm above the level of the ramu. Enteric tube traverses the GE junction with distal tip projecting over the medial left upper quadrant likely within the fundus of the stomach. Midline skin staples project over the chest.  Prior median sternotomy and CABG. Cardiac monitor leads overlie the chest. No obvious pneumothorax on limited portable supine imaging. Moderate parahilar and lower zone predominant airspace opacities, left greater than right. Retrocardiac airspace consolidation. Small left-sided pleural effusion. Trace right-sided pleural effusion. Stable mild cardiomegaly. Cardiac and mediastinal contours remain unchanged. Visualized osseous structures remain unchanged. No mediastinal shift. 1. Moderate parahilar and lower zone predominant airspace opacities, left greater than right. Retrocardiac airspace consolidation. Small left-sided pleural effusion. Trace right-sided pleural effusion. Findings may represent CHF related changes and/or multifocal pneumonia. Follow-up is recommended to document resolution. 2. Endotracheal and enteric tubes as detailed above. 3. Stable mild cardiomegaly. Xr Chest Portable    Result Date: 4/15/2019  EXAMINATION: SINGLE XRAY VIEW OF THE CHEST 4/14/2019 5:53 am COMPARISON: Chest radiograph 04/10/2019 HISTORY: ORDERING SYSTEM PROVIDED HISTORY: pt intubated TECHNOLOGIST PROVIDED HISTORY: pt intubated FINDINGS: Endotracheal tube and nasogastric tube are in unchanged position. Stable cardiomegaly. Pulmonary edema has improved since the prior examination. Small left pleural effusion and left basilar atelectasis have improved since the prior examination. No pneumothorax. Stable support lines and tubes. Pulmonary edema, small left pleural effusion, and left basilar atelectasis have improved since the prior examination. Xr Chest Portable    Result Date: 4/10/2019  EXAMINATION: SINGLE XRAY VIEW OF THE CHEST 4/10/2019 6:47 am COMPARISON: Chest radiograph performed 04/09/2019. HISTORY: ORDERING SYSTEM PROVIDED HISTORY: post op TECHNOLOGIST PROVIDED HISTORY: post op FINDINGS: There is redemonstration of bilateral pulmonary congestion/edema that is similar compared to prior. There are bibasilar effusions. There is no pneumothorax. The heart is prominent. The upper abdomen is unremarkable. The extrathoracic soft tissues are unremarkable. There is an endotracheal tube with the tip in the midtrachea. There is a gastric tube with the tip in the proximal aspect of the stomach. The right internal jugular line has been removed. The [place]-Carly catheter has been removed. Cardiomegaly and bilateral pulmonary congestion/edema that is similar compared to prior. Bibasilar effusions and stable endotracheal tube and gastric tube.      Xr Chest Portable    Result Date: 4/9/2019  EXAMINATION: SINGLE XRAY VIEW OF THE CHEST 4/9/2019 5:30 am COMPARISON: 04/08/2019 HISTORY: ORDERING SYSTEM PROVIDED HISTORY: post op TECHNOLOGIST PROVIDED HISTORY: post op FINDINGS: Endotracheal tube terminates 3.3 cm above the ramu. [place]-Carly catheter terminates overlying the pulmonary outflow tract. Multiple mediastinal drainage tube projects over the cardiac silhouette. Central vascular congestion and diffuse interstitial opacities, stable or slightly worsened. Small left pleural effusion suspected. Pulmonary vascular congestion and interstitial opacities, stable or slightly worse on current examination. Support tubes and lines as above, in grossly unchanged positioning. Xr Chest Portable    Result Date: 4/8/2019  EXAMINATION: SINGLE XRAY VIEW OF THE CHEST 4/8/2019 2:09 pm COMPARISON: 4/8/2019 HISTORY: ORDERING SYSTEM PROVIDED HISTORY: post op chest closure TECHNOLOGIST PROVIDED HISTORY: post op chest closure FINDINGS: Intact median sternotomy wires. Midline skin staples. ET tube in good position. Mediastinal drain present. Enteric tube within the expected location of the stomach. [place]-Carly catheter within the right main pulmonary artery as demonstrated previously. Stable cardiomegaly. Asymmetric pulmonary edema right greater than left, similar to prior. Small left effusion. No pneumothorax. Stable retrocardiac atelectasis. Similar bilateral asymmetric pulmonary edema right greater than left. Xr Chest Portable    Result Date: 4/8/2019  EXAMINATION: SINGLE XRAY VIEW OF THE CHEST 4/8/2019 5:06 am COMPARISON: Chest x-ray 04/07/2019 HISTORY: ORDERING SYSTEM PROVIDED HISTORY: post op TECHNOLOGIST PROVIDED HISTORY: post op FINDINGS: Endotracheal tube tip remains above the ramu. Left subclavian approach pulmonary artery catheter with the tip over the right main pulmonary artery. Mediastinal drains and left chest tube. No pneumothorax. There continue to be bilateral perihilar opacities, not significantly changed. Increasing left basilar opacity.   Cardiac silhouette is unremarkable. 1.  Persistent perihilar opacities could be due to pulmonary edema. 2.  Increased left basilar opacity is likely atelectasis. Xr Chest Portable    Result Date: 4/7/2019  EXAMINATION: SINGLE XRAY VIEW OF THE CHEST 4/7/2019 12:18 pm COMPARISON: 4/7/2019 HISTORY: ORDERING SYSTEM PROVIDED HISTORY: Post op open heart surgery TECHNOLOGIST PROVIDED HISTORY: Post op open heart surgery FINDINGS: [place]-Carly catheter directed into the right main pulmonary artery, new since the prior exam.  Mediastinal drain, NG tube, and ET tube are present. Heart is enlarged. Moderate to severe pulmonary edema, slightly improved from prior. 1. Slight improvement in pulmonary edema with interval placement of [place]-Carly catheter. 2. No pneumothorax. 3. Supporting devices are otherwise stable. Xr Chest Portable    Result Date: 4/7/2019  EXAMINATION: SINGLE XRAY VIEW OF THE CHEST 4/7/2019 5:14 am COMPARISON: None. HISTORY: ORDERING SYSTEM PROVIDED HISTORY: cardiac arrest TECHNOLOGIST PROVIDED HISTORY: cardiac arrest FINDINGS: Diffuse patchy bilateral airspace opacities. Endotracheal tube tip is about 5 cm above the ramu. Gastric tube tip projects over the proximal stomach. Costophrenic angles are clear. Cardiac and mediastinal structures are unremarkable. Diffuse patchy bilateral airspace opacities could be due to edema or multifocal pneumonia. Cta Neck W Contrast    Result Date: 4/18/2019  EXAMINATION: CTA OF THE HEAD WITH CONTRAST; CTA OF THE NECK 4/18/2019 5:31 pm: TECHNIQUE: CTA of the head/brain was performed with the administration of intravenous contrast. Multiplanar reformatted images are provided for review. MIP images are provided for review.  Dose modulation, iterative reconstruction, and/or weight based adjustment of the mA/kV was utilized to reduce the radiation dose to as low as reasonably achievable.; CTA of the neck was performed with the administration of intravenous contrast. Multiplanar reformatted images are provided for review. MIP images are provided for review. Stenosis of the internal carotid arteries measured using NASCET criteria. Dose modulation, iterative reconstruction, and/or weight based adjustment of the mA/kV was utilized to reduce the radiation dose to as low as reasonably achievable. 3D reconstructed images were performed on a separate workstation and provided for review. COMPARISON: CT head 04/12/2019 HISTORY: ORDERING SYSTEM PROVIDED HISTORY: STROKE TECHNOLOGIST PROVIDED HISTORY: Ordering Physician Provided Reason for Exam: STROKE S/P CABG Additional signs and symptoms: PT INTUBATED FINDINGS: CTA NECK: AORTIC ARCH/ARCH VESSELS: Moderate plaque identified involving the aortic arch is visualized. 3 vessel configuration of the aortic arch. Moderate severe stenosis identified involving the left common carotid artery origin and proximal common carotid artery and involving the left iliac artery. Motion artifact through the ostia great vessels challenge evaluation. There is 60-70% stenosis identified involving the proximal left common carotid artery and left subclavian artery. Mild-to-moderate focal stenosis identified involving the ostium of the right brachiocephalic artery measuring 30%. CAROTID ARTERIES: Left common carotid artery demonstrates moderate diffuse atherosclerotic disease with focal stenosis involving the left ostium measuring 60-70%. Right common carotid artery noted is mild diffuse atherosclerotic disease without hemodynamic stenosis or occlusion. . Right internal carotid artery demonstrates moderate diffuse atherosclerotic disease without up 60% stenosis per NASCET criteria. Left internal carotid artery is occluded at its origin sent to level skull base. No dissection or arterial injury is seen on the right. . 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 Right vertebral artery demonstrates mild stenosis at this ostium but otherwise grossly patent. [place] SOFT TISSUES: Large bilateral pleural fluid collections. Bibasilar airspace disease and atelectasis. Chronic emphysematous changes. .  No cervical or superior mediastinal lymphadenopathy. The visualized portion of the larynx and pharynx appear unremarkable. The parotid, submandibular and thyroid glands demonstrate no acute abnormality. BONES: The visualized osseous structures demonstrate multilevel degenerative changes. . CTA HEAD: ANTERIOR CIRCULATION: Right petrous segment, cavernous segments cervical segments are patent. Left petrous, cavernous segments are occluded. Reconstitution left carotid terminus likely due to combination of extracranial and intracranial collateral flow. The anterior cerebral and middle cerebral arteries demonstrate no focal stenosis. POSTERIOR CIRCULATION: Bilateral P comms. The posterior cerebral arteries demonstrate no focal stenosis. The vertebral and basilar arteries appear unremarkable. BRAIN: No mass effect or midline shift. No hyperdense extra-axial fluid collection. Chronic encephalomalacia left TOAN distribution involving both inferior frontal lobes. The gray-white differentiation appears grossly maintained. Air-fluid levels both sphenoid sinuses and posterior ethmoid air cells. Occlusion left ICA vertebral artery  extending to level skull base. Moderate atherosclerotic changes identified involving the remainder of the cervical arterial vasculature. Reconstitution left carotid terminus. Otherwise no hemodynamic stenosis or occlusion identified involving intracranial circulation .      Cta Head W Contrast    Result Date: 4/18/2019  EXAMINATION: CTA OF THE HEAD WITH CONTRAST; CTA OF THE NECK 4/18/2019 5:31 pm: TECHNIQUE: CTA of the head/brain was performed with the administration of intravenous contrast. Multiplanar reformatted images are involving intracranial circulation . Mri Brain Wo Contrast    Result Date: 4/14/2019  EXAMINATION: MRI OF THE BRAIN WITHOUT CONTRAST  4/14/2019 8:40 pm TECHNIQUE: Multiplanar multisequence MRI of the brain was performed without the administration of intravenous contrast. COMPARISON: CT head earlier today. HISTORY: ORDERING SYSTEM PROVIDED HISTORY: DECREASED ALERTNESS Initial encounter. FINDINGS: INTRACRANIAL STRUCTURES/VENTRICLES: There are small foci of diffusion restriction in the right brachium pontis, right cerebellar hemisphere, posterior left and right frontal centrum semiovale white matter, and the posterior right frontal corona radiata white matter. Bilateral anterior frontal and high left frontoparietal encephalomalacia is noted with surrounding gliosis. No mass effect or midline shift. No evidence of an acute intracranial hemorrhage. The ventricles and sulci are normal in size and configuration. The sellar/suprasellar regions appear unremarkable. There is absent flow void in the visualized left internal carotid artery. ORBITS: Orbital structures are unremarkable. SINUSES: Dependent paranasal sinus mucoperiosteal thickening and layering fluid with bilateral mastoid effusions may relate to intubation. BONES/SOFT TISSUES: The bone marrow signal intensity appears normal. The soft tissues demonstrate no acute abnormality. 1. Multiple acute ischemic infarcts in the right brachium pontis, right cerebellar hemisphere, and bilateral cerebral white matter as detailed above with distribution in multiple vascular territories suggesting central embolic etiology. 2. No hemorrhage or mass effect. 3. Bilateral anterior frontal and high left frontoparietal encephalomalacia consistent with sequela of prior insult. 4. Absent flow void in the left internal carotid artery consistent with high-grade proximal stenosis or occlusion. Assessment:     Right calf cellulitis .   Chronic steomyelitis of right tibia /infected hardware that was removed on 4/22/2019. History of ORIF of the right proximal tibia, noncompliant with follow-up with reported protruding hardware through the skin. Acute renal failure resolved     Coronary artery disease /recent cardiac arrest status post emergent cath and CABG on 4/7/2019 with delayed the chest wall incision closure. Acute CVA (cerebrovascular accident)     Moderate malnutrition ([address]      Recommendations:   Continue PO Doxycycline  for chronic suppression     PO Keflex  until 5/10/19 if the patient continued to improve. F follow CBC and renal function. Further recommendation as per hospital course. Thank you for allowing me to participate in the care of your patient. Please feel free to contact me with any questions or concerns.      Saji Montez MD

## 2019-05-04 NOTE — PROGRESS NOTES
Physical Medicine & Rehabilitation  Progress Note    Chief complaint:  Ambulatory and ADL dysfunction due to cardiac arrest, CABG, CVA, infected right lower extremity hardware, metabolic encephalopathy and respiratory failure        Subjective:      Patient is without new complaints. Positive bowel movement on Saturday. Pain 3/10 in chest with movement relieved by hugging hugger device. No shortness of breath or diaphoresis. ROS:  Denies fevers, chills, sweats. No chest pain, palpitations, lightheadedness. Denies coughing, wheezing or shortness of breath. Denies abdominal pain, nausea, diarrhea or constipation. No new areas of joint pain. Denies new areas of numbness or weakness. Denies new anxiety or depression issues. No new skin problems. Rehabilitation:   Progressing in therapies. PT:  Restrictions/Precautions: Cardiac, General Precautions, Surgical Protocols, Fall Risk(telesitter)  Sternal Precautions: Emergent CABG X4  Other position/activity restrictions: Fall Risk  Required Braces or Orthoses  Other: Heart Hugger Brace  Right Lower Extremity Brace: Ankle Foot Orthotics  RLE Brace Type: Profo boot and AFO  Left Lower Extremity Brace: Boot  LLE Brace Type: Profo boot   Transfers  Sit to Stand: Contact guard assistance  Stand to sit: Contact guard assistance  Bed to Chair: Stand by assistance  Stand Pivot Transfers: Contact guard assistance  Comment: Pt used RW. Pt needs verbal cueing for hand placement during transfers with little carry over. At times pt lose balance posteriorly  Ambulation 1  Surface: level tile  Device: Rolling Walker  Other Apparatus: Right, Dorsiflex Assist  Assistance: Contact guard assistance  Quality of Gait: High steppage on right LE to clear floor with right foot drop  Distance: 150 ft  twice; short distances within gym. Comments: Patient had tennis shoes with amb. today. Vitals WNL post amb.     Transfers  Sit to Stand: Contact guard assistance  Stand to sit: Contact guard assistance  Bed to Chair: Stand by assistance  Stand Pivot Transfers: Contact guard assistance  Comment: Pt used RW. Pt needs verbal cueing for hand placement during transfers with little carry over. At times pt lose balance posteriorly  Ambulation  Ambulation?: Yes  More Ambulation?: Yes  Ambulation 1  Surface: level tile  Device: Rolling Walker  Other Apparatus: Right, Dorsiflex Assist  Assistance: Contact guard assistance  Quality of Gait: High steppage on right LE to clear floor with right foot drop  Distance: 150 ft  twice; short distances within gym. Comments: Patient had tennis shoes with amb. today. Vitals WNL post amb. Surface: level tile  Ambulation 1  Surface: level tile  Device: Rolling Walker  Other Apparatus: Right, Dorsiflex Assist  Assistance: Contact guard assistance  Quality of Gait: High steppage on right LE to clear floor with right foot drop  Distance: 150 ft  twice; short distances within gym. Comments: Patient had tennis shoes with amb. today. Vitals WNL post amb. OT:  ADL  Additional Comments: see morning FIM    Instrumental ADL's  Instrumental ADLs: Yes     Balance  Sitting Balance: Supervision  Standing Balance: Contact guard assistance   Standing Balance  Time: 5 min , 2 min   Activity: Standing for laundry activity   Comment: one LOB   Functional Mobility  Functional - Mobility Device: Rolling Walker  Activity: To/from bathroom  Assist Level: Contact guard assistance  Functional Mobility Comments: Pt unsteady d/t R foot drop, decreased safety awareness c RW mgmt.       Bed mobility  Bridging: Stand by assistance  Rolling to Left: Stand by assistance  Rolling to Right: Stand by assistance  Supine to Sit: Stand by assistance  Sit to Supine: Stand by assistance  Scooting: Stand by assistance  Comment: VC for proper technique for bed mobility required  Transfers  Stand Step Transfers: Contact guard assistance  Stand Pivot Transfers: Minimal assistance, Moderate assistance  Sit to stand: Contact guard assistance  Stand to sit: Contact guard assistance  Transfer Comments: pt c fair hand placement-req cues, fair use of cardiac vest during sit<>stands. Toilet Transfers  Toilet - Technique: Ambulating  Equipment Used: Grab bars  Toilet Transfer: Contact guard assistance     Shower Transfers  Shower - Transfer From: Shavonne Mcbride - Transfer Type: To and From  Shower - Transfer To: Transfer tub bench  Shower - Technique: Ambulating  Shower Transfers: Contact Guard       Objective:  /65   Pulse 89   Temp 97.9 °F (36.6 °C) (Oral)   Resp 15   Ht 5' 8.11\" (1.73 m)   Wt 134 lb (60.8 kg)   SpO2 99%   BMI 20.31 kg/m²       Alert, no distress. Good speech and language function. Lungs clear. Heart regular. Abdomen non-distended, non-tender. No calf tenderness or edema. Motor strength-BUE-4+/5, LLE-4/5 hip and knee, DF trace, PF 3-/5, RLE-0/5 distally    Diagnostics:     CBC:   Recent Labs     05/04/19  0615   WBC 10.1   RBC 3.76*   HGB 11.0*   HCT 34.0*   MCV 90.5   RDW 15.0*        BMP:   Recent Labs     05/04/19  0615      K 4.4   CL 99   CO2 27   BUN 16   CREATININE 0.48*     BNP: No results for input(s): BNP in the last 72 hours. PT/INR: No results for input(s): PROTIME, INR in the last 72 hours. APTT: No results for input(s): APTT in the last 72 hours. CARDIAC ENZYMES: No results for input(s): CKMB, CKMBINDEX, TROPONINT in the last 72 hours.     Invalid input(s): CKTOTAL;3  FASTING LIPID PANEL:  Lab Results   Component Value Date    CHOL 56 04/17/2019    HDL 13 (L) 04/17/2019    TRIG 107 04/17/2019     LIVER PROFILE:   Recent Labs     05/04/19 0615   AST 26   ALT 43*   BILITOT 0.61   ALKPHOS 132*        Current Medications:   Current Facility-Administered Medications: oxyCODONE (ROXICODONE) immediate release tablet 5 mg, 5 mg, Oral, Q6H PRN **OR** [DISCONTINUED] oxyCODONE (ROXICODONE) immediate release tablet 10 mg, 10 mg, Oral, Q4H PRN  cephALEXin (KEFLEX) capsule 500 mg, 500 mg, Oral, 4 times per day  lisinopril (PRINIVIL;ZESTRIL) tablet 2.5 mg, 2.5 mg, Oral, Daily  furosemide (LASIX) tablet 20 mg, 20 mg, Oral, Daily  metoprolol tartrate (LOPRESSOR) tablet 25 mg, 25 mg, Oral, BID  insulin glargine (LANTUS) injection vial 35 Units, 35 Units, Subcutaneous, Daily  traZODone (DESYREL) tablet 50 mg, 50 mg, Oral, Nightly  enoxaparin (LOVENOX) injection 40 mg, 40 mg, Subcutaneous, Daily  atorvastatin (LIPITOR) tablet 40 mg, 40 mg, Oral, Nightly  clopidogrel (PLAVIX) tablet 75 mg, 75 mg, Oral, Daily  famotidine (PEPCID) tablet 20 mg, 20 mg, Oral, BID  therapeutic multivitamin-minerals 1 tablet, 1 tablet, Oral, Daily with breakfast  dextrose 5 % solution, 100 mL/hr, Intravenous, PRN  dextrose 50 % solution 12.5 g, 12.5 g, Intravenous, PRN  glucagon (rDNA) injection 1 mg, 1 mg, Intramuscular, PRN  glucose (GLUTOSE) 40 % oral gel 15 g, 15 g, Oral, PRN  magnesium hydroxide (MILK OF MAGNESIA) 400 MG/5ML suspension 30 mL, 30 mL, Oral, Daily PRN  albuterol (PROVENTIL) nebulizer solution 2.5 mg, 2.5 mg, Nebulization, Q6H PRN  aspirin chewable tablet 81 mg, 81 mg, Oral, Daily  docusate (COLACE) 50 MG/5ML liquid 100 mg, 100 mg, Oral, Daily  doxycycline monohydrate (MONODOX) capsule 100 mg, 100 mg, Oral, 2 times per day  insulin lispro (HUMALOG) injection vial 0-12 Units, 0-12 Units, Subcutaneous, TID WC  insulin lispro (HUMALOG) injection vial 0-6 Units, 0-6 Units, Subcutaneous, Nightly  bisacodyl (DULCOLAX) suppository 10 mg, 10 mg, Rectal, Daily PRN  docusate sodium (COLACE) capsule 100 mg, 100 mg, Oral, BID  polyethylene glycol (GLYCOLAX) packet 17 g, 17 g, Oral, Daily      Impression/Plan:  Patient is a 58-year-old male with ADL and mobility dysfunction secondary to cardiac arrest, CABG, CVA, infected right lower extremity hardware, metabolic encephalopathy and respiratory failure    1.  Continue acute inpatient rehab to work on improving functional deficits. Discharge plan 5/8-2019. Patient agreeable most likely will be discharged to skilled nursing facility due to limited support. 2. CV-cardiac arrest CAD, myopathy, CABG ×4 on 4/7/2019. Appreciate cardiology follow-up. 3. Pulmonary-patient with respiratory arrest on admission at [facility]. Farhad was extubated on 4/19/2019. Patient was weaned from oxygen. Continue to monitor. Doing well. Patient had encephalopathy slept better with trazodone Seroquel decreased. 4. ID-history of chronic osteomyelitis of the leg with hardware removal on 4/22/2019. Residual right foot drop. Weightbearing as tolerated. On doxycycline 60 days for chronic suppression with leukocytosis which is improving. Keflex until 5/10/2019. Continue dressing changes/wound care. 5. MSK-Patient has follow-up with Dr. Navdeep Atkins next week. 6. Hyponatremia resolved-135 on 5/4/2019.  7. Anemia-stable at 11 on 5/4/2019. Continue to monitor  8. Endocrine-history of diabetes on insulin sliding scale glucose as above. Continue monitoring. 9. Neurology- patient to follow-up with Dr. Caitie Cisse done on 5/21/2019 for decision on stenting patient on aspirin and Plavix and Lipitor. 10. Pain-on oxycodone consider taper. 11. Wounds-multiple wounds plaque and he'll PRAFO bilateral lower extremities to elevate heels off bed. Patient on Doxy and Keflex. 12. DVT prophylaxis Lovenox. 13. Internal medicine and infectious disease following. Electronically signed by Shanna Bowers MD on 5/4/2019 at 12:36 PM      This note is created with the assistance of a speech recognition program.  While intending to generate a document that actually reflects the content of the visit, the document can still have some errors including those of syntax and sound a like substitutions which may escape proof reading.   In such instances, actual meaning can be extrapolated by contextual diversion.

## 2019-05-04 NOTE — PROGRESS NOTES
[id number]    ACUTE REHABILITATION OCCUPATIONAL THERAPY  DAILY NOTE    Date: 19  Patient Name: Colleen Dorantes      Room: 2583/2904-59    MRN: [id number]   : 1965  (46 y.o.)  Gender: male   Referring Practitioner: Pamela Uribe MD  Diagnosis: Acute CVA, cardiac arrest, CABG  Additional Pertinent Hx: Pt admitted to [facility] ED 19 with cardiac arrest and increased SOB. Pt underwent emergent CABG x4 19 with delayed closure on 19. Pt was intubated from 19 to 19. Pt also underwent screw removal from ORIF of R knee on 19 and I&D on the R knee 19. Pt admitted to [facility] for rehabiliation on 19    Restrictions  Restrictions/Precautions: Cardiac, General Precautions, Surgical Protocols, Fall Risk(telesitter)  Sternal Precautions: Emergent CABG X4  Other position/activity restrictions: Fall Risk  Required Braces or Orthoses  Other: Heart Hugger Brace  Right Lower Extremity Brace: Ankle Foot Orthotics  RLE Brace Type: Profo boot and AFO  Left Lower Extremity Brace: Boot  LLE Brace Type: Profo boot  Required Braces or Orthoses?: Yes(R AFO, R procare boot)    Subjective  Subjective: Pleasant and cooperative   Patient Currently in Pain: Denies  Restrictions/Precautions: Cardiac;General Precautions;Surgical Protocols; Fall Risk  Overall Orientation Status: Within Functional Limits          Objective  Cognition  Overall Cognitive Status: Impaired  Cognition  Overall Cognitive Status: WFL  Arousal/Alertness: Appropriate responses to stimuli  Following Commands:  Follows one step commands with increased time  Attention Span: Difficulty dividing attention  Memory: Decreased recall of biographical Information(Occasional word finding deficit - unable to rembered what they called it for  his lunch - grilled cheese  sandwich  )  Safety Judgement: Good awareness of safety precautions  Problem Solving: Assistance required to generate solutions;Assistance required to implement solutions  Initiation: Requires cues for some  Sequencing: Requires cues for some  Perception  Initiation: Cues to initiate tasks  Balance  Sitting Balance: Supervision  Standing Balance: Contact guard assistance  Bed mobility  Rolling to Left: Stand by assistance  Rolling to Right: Stand by assistance  Supine to Sit: Stand by assistance  Sit to Supine: Stand by assistance  Transfers  Stand to sit: Contact guard assistance  Transfer Comments: pt c fair hand placement-req cues, fair use of cardiac vest during sit<>stands. Standing Balance  Time: 5 min  Comment: one LOB   Functional Mobility  Functional - Mobility Device: Rolling Walker  Activity: To/from bathroom  Assist Level: Contact guard assistance  Functional Mobility Comments: Pt unsteady d/t R foot drop, decreased safety awareness c RW mgmt. Toilet Transfers  Toilet - Technique: Ambulating  Equipment Used: Raised toilet seat with rails  Toilet Transfer: Contact guard assistance  Shower Transfers  Shower - Transfer From: Ambika Servin - Transfer Type: To and From  Shower - Transfer To: Transfer tub bench  Shower - Technique: Ambulating  Shower Transfers: Minimal assistance  Gross Motor: Theracisoe for wrist and hand stengthening, bilateral use tasks   Fine Motor: Golf yogesh grasp and manipulaton, peg feeing from hand , general manipulaton of objects ;  [employer] pegs woth washer and barrel   Type of ROM/Therapeutic Exercise  Type of ROM/Therapeutic Exercise: AAROM;AROM  Comment: Pt completes BUE ex's to address overall strength and endurance for functional tasks. Pt completed ex's in seated position, slow and controlled, shortened rest breaks req. All planes of mvmt within cardiac restrictions. Improved tolerance noted this date. Exercises  Grasp/Release: 2nd setting hand gripper 3x15 reps B hands to improve  strength for functional tasks. Other: seated cone reaching intermittently c BUE crossing midline to address overall endurance.  pt demo good

## 2019-05-04 NOTE — PLAN OF CARE
Problem: Falls - Risk of:  Goal: Will remain free from falls  Description  Will remain free from falls  Outcome: Met This Shift     No falls or injuries sustained at this time. Pt forgetful of limitations and does occasionally attempt to get out of bed without nursing assistance. Telesitter remains in place as a precaution. Call light within reach. Pt. reoriented to surroundings and reminded to use call light with each nurse/patient interaction. Side rails up x 2. Nonskid footwear remains on. Bed in low and locked position. Hourly nursing rounds made. Pt. room located close to nurse's station. Bed alarm remains engaged throughout the shift as a precaution. Problem: Risk for Impaired Skin Integrity  Goal: Tissue integrity - skin and mucous membranes  Description  Structural intactness and normal physiological function of skin and  mucous membranes. Outcome: Ongoing  Skin assessment completed this shift. Nutrition and Hydration status assessed with adequate intake. Yash Score as charted. SPR Overlay remains intact and inflated to patient's bed throughout the shift. Bilateral heels remain elevated on pillows throughout the shift. Right Prafo boot in use when pt is in bed for suspected deep tissue injury to Right Heel. Patient able to reposition self for comfort and to prevent breakdown. Patient verbalizes understanding of pressure ulcer prevention measures. Skin integrity maintained. No new skin breakdown noted. Shana / Incontinence care assisted with as needed throughout the shift. Sacral Mepilex remains intact. Pt with multiple BLE incisions/wounds. Midline sternal incision is well approximated, REGINE. See LDA documentation. Wound Care nurse following. Problem: Pain:  Goal: Pain level will decrease  Description  Pain level will decrease  Outcome: Ongoing  Pain assessment performed this shift. Pt. able to rest after the use of pain medication.   Patient medicated with Roxicodone for c/o incisional pain this shift. Pt. Repositions with per self for comfort. Nonverbal cues indicate pain relief. Pt. Rests quietly with eyes closed after pain medication administration. Respirations easy and unlabored. Appears free from distress.

## 2019-05-05 LAB
GLUCOSE BLD-MCNC: 161 MG/DL (ref 75–110)
GLUCOSE BLD-MCNC: 165 MG/DL (ref 75–110)
GLUCOSE BLD-MCNC: 72 MG/DL (ref 75–110)
GLUCOSE BLD-MCNC: 97 MG/DL (ref 75–110)

## 2019-05-05 PROCEDURE — 97530 THERAPEUTIC ACTIVITIES: CPT

## 2019-05-05 PROCEDURE — 6370000000 HC RX 637 (ALT 250 FOR IP): Performed by: PHYSICAL MEDICINE & REHABILITATION

## 2019-05-05 PROCEDURE — 97116 GAIT TRAINING THERAPY: CPT

## 2019-05-05 PROCEDURE — 6370000000 HC RX 637 (ALT 250 FOR IP): Performed by: INTERNAL MEDICINE

## 2019-05-05 PROCEDURE — 6370000000 HC RX 637 (ALT 250 FOR IP): Performed by: STUDENT IN AN ORGANIZED HEALTH CARE EDUCATION/TRAINING PROGRAM

## 2019-05-05 PROCEDURE — 1180000000 HC REHAB R&B

## 2019-05-05 PROCEDURE — 82947 ASSAY GLUCOSE BLOOD QUANT: CPT

## 2019-05-05 PROCEDURE — 97535 SELF CARE MNGMENT TRAINING: CPT

## 2019-05-05 PROCEDURE — 99232 SBSQ HOSP IP/OBS MODERATE 35: CPT | Performed by: INTERNAL MEDICINE

## 2019-05-05 PROCEDURE — 97110 THERAPEUTIC EXERCISES: CPT

## 2019-05-05 PROCEDURE — 97127 HC OT THER IVNTJ W/FOCUS COG FUNCJ: CPT

## 2019-05-05 PROCEDURE — 6370000000 HC RX 637 (ALT 250 FOR IP): Performed by: NURSE PRACTITIONER

## 2019-05-05 PROCEDURE — 6360000002 HC RX W HCPCS: Performed by: NURSE PRACTITIONER

## 2019-05-05 PROCEDURE — 99232 SBSQ HOSP IP/OBS MODERATE 35: CPT | Performed by: PHYSICAL MEDICINE & REHABILITATION

## 2019-05-05 PROCEDURE — 99231 SBSQ HOSP IP/OBS SF/LOW 25: CPT | Performed by: INTERNAL MEDICINE

## 2019-05-05 RX ORDER — INSULIN GLARGINE 100 [IU]/ML
32 INJECTION, SOLUTION SUBCUTANEOUS DAILY
Status: DISCONTINUED | OUTPATIENT
Start: 2019-05-06 | End: 2019-05-06

## 2019-05-05 RX ADMIN — ENOXAPARIN SODIUM 40 MG: 100 INJECTION SUBCUTANEOUS at 08:14

## 2019-05-05 RX ADMIN — ASPIRIN 81 MG 81 MG: 81 TABLET ORAL at 08:14

## 2019-05-05 RX ADMIN — CEPHALEXIN 500 MG: 500 CAPSULE ORAL at 11:26

## 2019-05-05 RX ADMIN — DOXYCYCLINE 100 MG: 100 CAPSULE ORAL at 08:13

## 2019-05-05 RX ADMIN — OXYCODONE HYDROCHLORIDE 5 MG: 5 TABLET ORAL at 20:50

## 2019-05-05 RX ADMIN — DOCUSATE SODIUM 100 MG: 100 CAPSULE, LIQUID FILLED ORAL at 08:13

## 2019-05-05 RX ADMIN — LISINOPRIL 2.5 MG: 5 TABLET ORAL at 08:14

## 2019-05-05 RX ADMIN — FAMOTIDINE 20 MG: 20 TABLET ORAL at 08:14

## 2019-05-05 RX ADMIN — FUROSEMIDE 20 MG: 20 TABLET ORAL at 08:14

## 2019-05-05 RX ADMIN — DOXYCYCLINE 100 MG: 100 CAPSULE ORAL at 20:51

## 2019-05-05 RX ADMIN — INSULIN LISPRO 2 UNITS: 100 INJECTION, SOLUTION INTRAVENOUS; SUBCUTANEOUS at 11:27

## 2019-05-05 RX ADMIN — CEPHALEXIN 500 MG: 500 CAPSULE ORAL at 05:50

## 2019-05-05 RX ADMIN — INSULIN GLARGINE 35 UNITS: 100 INJECTION, SOLUTION SUBCUTANEOUS at 08:19

## 2019-05-05 RX ADMIN — DOCUSATE SODIUM 100 MG: 100 CAPSULE, LIQUID FILLED ORAL at 20:50

## 2019-05-05 RX ADMIN — CEPHALEXIN 500 MG: 500 CAPSULE ORAL at 17:16

## 2019-05-05 RX ADMIN — CEPHALEXIN 500 MG: 500 CAPSULE ORAL at 23:21

## 2019-05-05 RX ADMIN — FAMOTIDINE 20 MG: 20 TABLET ORAL at 20:50

## 2019-05-05 RX ADMIN — OXYCODONE HYDROCHLORIDE 5 MG: 5 TABLET ORAL at 05:52

## 2019-05-05 RX ADMIN — ATORVASTATIN CALCIUM 40 MG: 40 TABLET, FILM COATED ORAL at 20:49

## 2019-05-05 RX ADMIN — INSULIN LISPRO 1 UNITS: 100 INJECTION, SOLUTION INTRAVENOUS; SUBCUTANEOUS at 20:51

## 2019-05-05 RX ADMIN — TRAZODONE HYDROCHLORIDE 50 MG: 50 TABLET ORAL at 20:50

## 2019-05-05 RX ADMIN — METOPROLOL TARTRATE 25 MG: 25 TABLET ORAL at 08:14

## 2019-05-05 RX ADMIN — CLOPIDOGREL BISULFATE 75 MG: 75 TABLET ORAL at 08:14

## 2019-05-05 RX ADMIN — METOPROLOL TARTRATE 25 MG: 25 TABLET ORAL at 20:49

## 2019-05-05 RX ADMIN — MULTIPLE VITAMINS W/ MINERALS TAB 1 TABLET: TAB at 08:13

## 2019-05-05 ASSESSMENT — PAIN DESCRIPTION - LOCATION
LOCATION: CHEST
LOCATION: CHEST

## 2019-05-05 ASSESSMENT — PAIN DESCRIPTION - DESCRIPTORS
DESCRIPTORS: ACHING

## 2019-05-05 ASSESSMENT — PAIN DESCRIPTION - PROGRESSION: CLINICAL_PROGRESSION: NOT CHANGED

## 2019-05-05 ASSESSMENT — PAIN - FUNCTIONAL ASSESSMENT: PAIN_FUNCTIONAL_ASSESSMENT: ACTIVITIES ARE NOT PREVENTED

## 2019-05-05 ASSESSMENT — PAIN DESCRIPTION - PAIN TYPE
TYPE: SURGICAL PAIN
TYPE: SURGICAL PAIN
TYPE: CHRONIC PAIN

## 2019-05-05 ASSESSMENT — PAIN SCALES - GENERAL
PAINLEVEL_OUTOF10: 2
PAINLEVEL_OUTOF10: 0
PAINLEVEL_OUTOF10: 4
PAINLEVEL_OUTOF10: 5
PAINLEVEL_OUTOF10: 0

## 2019-05-05 ASSESSMENT — PAIN DESCRIPTION - ORIENTATION
ORIENTATION: MID
ORIENTATION: MID

## 2019-05-05 ASSESSMENT — PAIN DESCRIPTION - ONSET
ONSET: ON-GOING
ONSET: ON-GOING

## 2019-05-05 ASSESSMENT — PAIN DESCRIPTION - FREQUENCY
FREQUENCY: INTERMITTENT
FREQUENCY: INTERMITTENT

## 2019-05-05 NOTE — PLAN OF CARE
Problem: Risk for Impaired Skin Integrity  Goal: Tissue integrity - skin and mucous membranes  Description  Structural intactness and normal physiological function of skin and  mucous membranes. Outcome: Ongoing  Note:   Pt skin integrity remained intact, no new alterations noted. Head to toe completed, see chart assessment. Problem: Falls - Risk of:  Goal: Will remain free from falls  Description  Will remain free from falls  Outcome: Ongoing  Note:   Pt remained absent from falls. Call light within reach. Bed locked and in lowest position.        Problem: Pain:  Goal: Pain level will decrease  Description  Pain level will decrease  Outcome: Ongoing  Note:   Pain assessed and charted medicated as ordered     Problem: Pain:  Goal: Pain level will decrease  Description  Pain level will decrease  Outcome: Ongoing  Note:   Pain assessed and charted medicated as ordered

## 2019-05-05 NOTE — PROGRESS NOTES
light  ENT: hearing grossly normal bilaterally  Neck: neck supple and non tender without mass  Pulmonary/Chest: clear to auscultation bilaterally- no wheezes, rales or rhonchi, normal air movement, no respiratory distress  Cardiovascular: normal rate, regular rhythm, normal S1 and S2, no murmurs. Abdomen: soft, non-tender, non-distended  Extremities: no cyanosis, clubbing  Right calf area of erythema, induration and tenderness,no fluctuation or crepitations better . Surgical incision below her right knee with mild surrounding erythema, no purulent drainage no crepitance no fluctuation. Musculoskeletal: normal range of motion, no joint swelling, deformity or tenderness      Data Review:        Imaging Studies:                           All appropriate imaging studies and reports reviewed: Yes       Xr Femur Right (min 2 Views)    Result Date: 4/7/2019  EXAMINATION: 2 XRAY VIEWS OF THE RIGHT FEMUR 4/7/2019 3:28 pm COMPARISON: None. HISTORY: ORDERING SYSTEM PROVIDED HISTORY: Trauma/Fracture TECHNOLOGIST PROVIDED HISTORY: Trauma/Fracture FINDINGS: The femur appears intact. The hip and knee are in anatomic alignment. Hardware is noted in the proximal tibia. Soft tissues are unremarkable. No fracture     Xr Knee Right (3 Views)    Result Date: 4/7/2019  EXAMINATION: 3 XRAY VIEWS OF THE RIGHT KNEE 4/7/2019 5:00 pm COMPARISON: 04/07/2019 HISTORY: ORDERING SYSTEM PROVIDED HISTORY: s/p Hardware removal at bedside TECHNOLOGIST PROVIDED HISTORY: s/p Hardware removal at bedside FINDINGS: Interval removal of anterior superior fixating screw. No periprosthetic fracture. Chronic deformity of the proximal fibula. Degenerative changes of knee and tib-fib syndesmosis. Removal of anterior-superior fixating screw without interval change otherwise. Xr Knee Right (3 Views)    Result Date: 4/7/2019  EXAMINATION: 3 XRAY VIEWS OF THE RIGHT KNEE 4/7/2019 3:28 pm COMPARISON: None.  HISTORY: ORDERING SYSTEM PROVIDED HISTORY: Trauma/Fracture TECHNOLOGIST PROVIDED HISTORY: Trauma/Fracture FINDINGS: Sideplate and 4 screws transfix the proximal tibia laterally. Old healed fracture proximal fibular neck and head. Spurring lateral tibia plateau. Proximal tibiofibular syndesmosis. Visualized femur and patella normal. Soft tissues unremarkable. ORIF proximal tibia. No acute fracture. Xr Tibia Fibula Right (2 Views)    Result Date: 4/22/2019  Radiology exam is complete. No Radiologist dictation. Please follow up with ordering provider. Ct Head Wo Contrast    Result Date: 4/13/2019  EXAMINATION: CT OF THE HEAD WITHOUT CONTRAST  4/13/2019 4:29 pm TECHNIQUE: CT of the head was performed without the administration of intravenous contrast. Dose modulation, iterative reconstruction, and/or weight based adjustment of the mA/kV was utilized to reduce the radiation dose to as low as reasonably achievable. COMPARISON: 04/12/2019 HISTORY: ORDERING SYSTEM PROVIDED HISTORY: f/u ct, s/p cardiac arrest, ? attenuation in L parietal lobe TECHNOLOGIST PROVIDED HISTORY: Ordering Physician Provided Reason for Exam: f/u ct, s/p cardiac arrest, ? attenuation in L parietal lobe Acuity: Unknown Type of Exam: Unknown FINDINGS: BRAIN/VENTRICLES: There is no evidence of acute infarct or hemorrhage. Previously suspected left parietal hypodensity is better seen on the current study to represent partial volume averaging of a sulcus. Bilateral anterior frontal and high left frontoparietal encephalomalacia is re- demonstrated. There is no hydrocephalus or extra-axial fluid collection. Midline is maintained. Basal cisterns are patent. ORBITS: The visualized portion of the orbits demonstrate no acute abnormality. SINUSES: Diffuse paranasal sinus mucoperiosteal thickening. Trace bilateral mastoid effusions. These findings may relate to intubation. SOFT TISSUES/SKULL:  No acute abnormality of the visualized skull or soft tissues.      1. No acute intracranial abnormality. 2. Stable multifocal encephalomalacia. Ct Head Wo Contrast    Result Date: 4/12/2019  EXAMINATION: CT OF THE HEAD WITHOUT CONTRAST  4/12/2019 10:25 am TECHNIQUE: CT of the head was performed without the administration of intravenous contrast. Dose modulation, iterative reconstruction, and/or weight based adjustment of the mA/kV was utilized to reduce the radiation dose to as low as reasonably achievable. COMPARISON: None. HISTORY: ORDERING SYSTEM PROVIDED HISTORY: NEURO DEFICIT(S), SUBACUTE, PROGRESSIVE OR FLUCTUATING TECHNOLOGIST PROVIDED HISTORY: Ordering Physician Provided Reason for Exam: History of Present Illness: This is a 46 y.o. male with unknown medical history admitted for management of cardiac arrest and STEMI. Patient initially called EMS for worsening SOB, placed on CPAP, given breathing treatments, solumedrol, aspirin, transport initiated to [facility]. EKG done by EMS showed possible anterior septal STEMI. On arrival to [facility] ED, pulses were lost. ACLS protocol initiated, CPR initiated patient intubated. EKG showed PEA. Three rounds CPR with epi given, had return of pulses. Bedside ECHO at that time showed globally decreased systolic contraction. Cardiology consulted, agreed for cath. 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 Right iliac a also noted to be occluded. FINDINGS: BRAIN/VENTRICLES: There is no acute intracranial hemorrhage, mass effect or midline shift. No abnormal extra-axial fluid collection. The gray-white differentiation is maintained without evidence of an acute infarct. There is no evidence of hydrocephalus. Encephalomalacia is present in the left frontal lobe consistent with remote infarct. Punctate density is noted along the encephalomalacia which may represent an area of dystrophic calcification.   An additional area of encephalomalacia is noted in the high left parietal lobe. An area of encephalomalacia is also present in the right frontal lobe, and there is hypodensity in the left posterior parietal lobe which may represent subacute to chronic infarct. Scattered hypodensities are present in the white matter consistent with chronic microvascular change. Vertebral and internal carotid arteries are calcified. Small extradural calcification is noted near the vertex, right side. This may represent a small meningioma without mass effect of 8 mm. ORBITS: The visualized portion of the orbits demonstrate no acute abnormality. SINUSES: Moderate mucoperiosteal thickening is noted in the maxillary and sphenoid sinuses. Frontal sinuses are hypoplastic. Significant mucoperiosteal thickening is noted in the ethmoid air cells. Mastoid air cells are appropriately aerated. SOFT TISSUES/SKULL:  No acute abnormality of the visualized skull or soft tissues. Estimated biologic radiation dose for this procedure:1059.07 mGy/cm2. Scattered areas of encephalomalacia, bifrontal and high left parietal areas consistent with remote infarct. There is hypodensity in the left lower parietal region possibly a subacute infarct. Chronic microvascular changes are noted. A punctate density is noted adjacent to the encephalomalacia in the left frontal lobe, favoring dystrophic calcification over petechial hemorrhage. RECOMMENDATIONS: MRI imaging of the brain is suggested given differing ages of the ischemic changes in the patient's history. Nm Hepatobiliary    Result Date: 4/15/2019  EXAMINATION: NUCLEAR MEDICINE HEPATOBILIARY SCINTIGRAPHY (HIDA SCAN). 4/15/2019 8:28 am TECHNIQUE: Approximately 3 [id number] Mebrofenin (Choletec) was administered IV. Then, dynamic images of the abdomen were obtained in the anterior projection for 60 mins. COMPARISON: Renal ultrasound April 12, 2019.  HISTORY: ORDERING SYSTEM PROVIDED HISTORY: CHOLECYSTITIS TECHNOLOGIST PROVIDED HISTORY: Ordering Physician Provided Reason for Exam: Thickened GB wall·Hepatitis  - possible amiodarone, GB w sludge FINDINGS: Prompt, homogenous uptake by the liver is noted with normal appearance of radiotracer excretion into the biliary system. Clearance of bloodpool activity appears appropriate. Gallbladder and small bowel is visualized in appropriate sequence. No scintigraphic evidence of biliary obstruction. Us Renal Complete    Result Date: 4/13/2019  EXAMINATION: RETROPERITONEAL ULTRASOUND OF THE KIDNEYS 4/12/2019 COMPARISON: None HISTORY: ORDERING SYSTEM PROVIDED HISTORY: RENAL FAILURE, ACUTE (KIDNEY INJURY) TECHNOLOGIST PROVIDED HISTORY: increased creatinine FINDINGS: Kidneys: The right kidney measures 12.5 cm in length and the left kidney measures 12.6 cm in length. Kidneys demonstrate normal cortical echogenicity. No evidence of hydronephrosis or intrarenal stones. There is 1.1 x 1.0 x 0.8 cm left renal cyst. Other: Partially imaged gallbladder sludge. Apparent gallbladder wall thickening. 1. No evidence of hydronephrosis. Normal renal cortical echogenicity. 2. Partially imaged gallbladder sludge. Apparent gallbladder wall thickening. Consider further evaluation with dedicated right upper quadrant ultrasound. Xr Chest Portable    Result Date: 4/19/2019  EXAMINATION: SINGLE XRAY VIEW OF THE CHEST 4/19/2019 6:38 am COMPARISON: April 17, 2019 HISTORY: ORDERING SYSTEM PROVIDED HISTORY: intubated TECHNOLOGIST PROVIDED HISTORY: intubated FINDINGS: Endotracheal tube with the tip just below the level of the clavicles. Enteric tube with the tip within the stomach. Partial clearing at the right lung base. Persistent left lung base opacities. Sternotomy. Cardiomegaly. Skin staples. No definite pulmonary edema. Partial clearing at the right lung base. Persistent left lung base opacity.      Xr Chest Portable    Result Date: 4/17/2019  EXAMINATION: SINGLE XRAY VIEW OF THE CHEST 4/17/2019 10:13 am COMPARISON: April 15, 2019 HISTORY: ORDERING SYSTEM PROVIDED HISTORY: possible pneumonia TECHNOLOGIST PROVIDED HISTORY: possible pneumonia Ordering Physician Provided Reason for Exam: possible pneumonia. upright portable. FINDINGS: ET tube in good position. Enteric tube passes beneath the diaphragm. Intact sternotomy wires. Cardiac leads overlie the chest.  Enlarged heart with moderate pulmonary edema. Bilateral pleural effusions. No pneumothorax. Stable moderate pulmonary edema and effusions. Stable supporting devices. Xr Chest Portable    Result Date: 4/15/2019  EXAMINATION: SINGLE XRAY VIEW OF THE CHEST 4/15/2019 6:15 am COMPARISON: 04/14/2019, 531 hours HISTORY: ORDERING SYSTEM PROVIDED HISTORY: intubated, s/p [facility] TECHNOLOGIST PROVIDED HISTORY: intubated, s/p [facility] 50-year-old male who is intubated status post open heart surgery FINDINGS: Portable supine view of the chest. Endotracheal tube distal tip overlying the mid trachea approximately 3.6 cm above the level of the ramu. Enteric tube traverses the GE junction with distal tip projecting over the medial left upper quadrant likely within the fundus of the stomach. Midline skin staples project over the chest.  Prior median sternotomy and CABG. Cardiac monitor leads overlie the chest. No obvious pneumothorax on limited portable supine imaging. Moderate parahilar and lower zone predominant airspace opacities, left greater than right. Retrocardiac airspace consolidation. Small left-sided pleural effusion. Trace right-sided pleural effusion. Stable mild cardiomegaly. Cardiac and mediastinal contours remain unchanged. Visualized osseous structures remain unchanged. No mediastinal shift. 1. Moderate parahilar and lower zone predominant airspace opacities, left greater than right. Retrocardiac airspace consolidation. Small left-sided pleural effusion. Trace right-sided pleural effusion. Findings may represent CHF related changes and/or multifocal pneumonia. post op FINDINGS: Endotracheal tube terminates 3.3 cm above the ramu. [place]-Carly catheter terminates overlying the pulmonary outflow tract. Multiple mediastinal drainage tube projects over the cardiac silhouette. Central vascular congestion and diffuse interstitial opacities, stable or slightly worsened. Small left pleural effusion suspected. Pulmonary vascular congestion and interstitial opacities, stable or slightly worse on current examination. Support tubes and lines as above, in grossly unchanged positioning. Xr Chest Portable    Result Date: 4/8/2019  EXAMINATION: SINGLE XRAY VIEW OF THE CHEST 4/8/2019 2:09 pm COMPARISON: 4/8/2019 HISTORY: ORDERING SYSTEM PROVIDED HISTORY: post op chest closure TECHNOLOGIST PROVIDED HISTORY: post op chest closure FINDINGS: Intact median sternotomy wires. Midline skin staples. ET tube in good position. Mediastinal drain present. Enteric tube within the expected location of the stomach. [place]-Carly catheter within the right main pulmonary artery as demonstrated previously. Stable cardiomegaly. Asymmetric pulmonary edema right greater than left, similar to prior. Small left effusion. No pneumothorax. Stable retrocardiac atelectasis. Similar bilateral asymmetric pulmonary edema right greater than left. Xr Chest Portable    Result Date: 4/8/2019  EXAMINATION: SINGLE XRAY VIEW OF THE CHEST 4/8/2019 5:06 am COMPARISON: Chest x-ray 04/07/2019 HISTORY: ORDERING SYSTEM PROVIDED HISTORY: post op TECHNOLOGIST PROVIDED HISTORY: post op FINDINGS: Endotracheal tube tip remains above the ramu. Left subclavian approach pulmonary artery catheter with the tip over the right main pulmonary artery. Mediastinal drains and left chest tube. No pneumothorax. There continue to be bilateral perihilar opacities, not significantly changed. Increasing left basilar opacity. Cardiac silhouette is unremarkable.      1.  Persistent perihilar opacities could be due to pulmonary edema. 2.  Increased left basilar opacity is likely atelectasis. Xr Chest Portable    Result Date: 4/7/2019  EXAMINATION: SINGLE XRAY VIEW OF THE CHEST 4/7/2019 12:18 pm COMPARISON: 4/7/2019 HISTORY: ORDERING SYSTEM PROVIDED HISTORY: Post op open heart surgery TECHNOLOGIST PROVIDED HISTORY: Post op open heart surgery FINDINGS: [place]-Carly catheter directed into the right main pulmonary artery, new since the prior exam.  Mediastinal drain, NG tube, and ET tube are present. Heart is enlarged. Moderate to severe pulmonary edema, slightly improved from prior. 1. Slight improvement in pulmonary edema with interval placement of [place]-Carly catheter. 2. No pneumothorax. 3. Supporting devices are otherwise stable. Xr Chest Portable    Result Date: 4/7/2019  EXAMINATION: SINGLE XRAY VIEW OF THE CHEST 4/7/2019 5:14 am COMPARISON: None. HISTORY: ORDERING SYSTEM PROVIDED HISTORY: cardiac arrest TECHNOLOGIST PROVIDED HISTORY: cardiac arrest FINDINGS: Diffuse patchy bilateral airspace opacities. Endotracheal tube tip is about 5 cm above the ramu. Gastric tube tip projects over the proximal stomach. Costophrenic angles are clear. Cardiac and mediastinal structures are unremarkable. Diffuse patchy bilateral airspace opacities could be due to edema or multifocal pneumonia. Cta Neck W Contrast    Result Date: 4/18/2019  EXAMINATION: CTA OF THE HEAD WITH CONTRAST; CTA OF THE NECK 4/18/2019 5:31 pm: TECHNIQUE: CTA of the head/brain was performed with the administration of intravenous contrast. Multiplanar reformatted images are provided for review. MIP images are provided for review. Dose modulation, iterative reconstruction, and/or weight based adjustment of the mA/kV was utilized to reduce the radiation dose to as low as reasonably achievable.; CTA of the neck was performed with the administration of intravenous contrast. Multiplanar reformatted images are provided for review.   MIP images are provided for review. Stenosis of the internal carotid arteries measured using NASCET criteria. Dose modulation, iterative reconstruction, and/or weight based adjustment of the mA/kV was utilized to reduce the radiation dose to as low as reasonably achievable. 3D reconstructed images were performed on a separate workstation and provided for review. COMPARISON: CT head 04/12/2019 HISTORY: ORDERING SYSTEM PROVIDED HISTORY: STROKE TECHNOLOGIST PROVIDED HISTORY: Ordering Physician Provided Reason for Exam: STROKE S/P CABG Additional signs and symptoms: PT INTUBATED FINDINGS: CTA NECK: AORTIC ARCH/ARCH VESSELS: Moderate plaque identified involving the aortic arch is visualized. 3 vessel configuration of the aortic arch. Moderate severe stenosis identified involving the left common carotid artery origin and proximal common carotid artery and involving the left iliac artery. Motion artifact through the ostia great vessels challenge evaluation. There is 60-70% stenosis identified involving the proximal left common carotid artery and left subclavian artery. Mild-to-moderate focal stenosis identified involving the ostium of the right brachiocephalic artery measuring 30%. CAROTID ARTERIES: Left common carotid artery demonstrates moderate diffuse atherosclerotic disease with focal stenosis involving the left ostium measuring 60-70%. Right common carotid artery noted is mild diffuse atherosclerotic disease without hemodynamic stenosis or occlusion. . Right internal carotid artery demonstrates moderate diffuse atherosclerotic disease without up 60% stenosis per NASCET criteria. Left internal carotid artery is occluded at its origin sent to level skull base. No dissection or arterial injury is seen on the right. . 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   Right vertebral artery demonstrates mild stenosis at this ostium but otherwise grossly patent. Winifred Never SOFT TISSUES: Large bilateral pleural fluid collections. Bibasilar airspace disease and atelectasis. Chronic emphysematous changes. .  No cervical or superior mediastinal lymphadenopathy. The visualized portion of the larynx and pharynx appear unremarkable. The parotid, submandibular and thyroid glands demonstrate no acute abnormality. BONES: The visualized osseous structures demonstrate multilevel degenerative changes. . CTA HEAD: ANTERIOR CIRCULATION: Right petrous segment, cavernous segments cervical segments are patent. Left petrous, cavernous segments are occluded. Reconstitution left carotid terminus likely due to combination of extracranial and intracranial collateral flow. The anterior cerebral and middle cerebral arteries demonstrate no focal stenosis. POSTERIOR CIRCULATION: Bilateral P comms. The posterior cerebral arteries demonstrate no focal stenosis. The vertebral and basilar arteries appear unremarkable. BRAIN: No mass effect or midline shift. No hyperdense extra-axial fluid collection. Chronic encephalomalacia left TOAN distribution involving both inferior frontal lobes. The gray-white differentiation appears grossly maintained. Air-fluid levels both sphenoid sinuses and posterior ethmoid air cells. Occlusion left ICA vertebral artery  extending to level skull base. Moderate atherosclerotic changes identified involving the remainder of the cervical arterial vasculature. Reconstitution left carotid terminus. Otherwise no hemodynamic stenosis or occlusion identified involving intracranial circulation . Cta Head W Contrast    Result Date: 4/18/2019  EXAMINATION: CTA OF THE HEAD WITH CONTRAST; CTA OF THE NECK 4/18/2019 5:31 pm: TECHNIQUE: CTA of the head/brain was performed with the administration of intravenous contrast. Multiplanar reformatted images are provided for review. MIP images are provided for review.  Dose modulation, iterative reconstruction, and/or weight based adjustment of the mA/kV was utilized to reduce the radiation dose to as low as reasonably achievable.; CTA of the neck was performed with the administration of intravenous contrast. Multiplanar reformatted images are provided for review. MIP images are provided for review. Stenosis of the internal carotid arteries measured using NASCET criteria. Dose modulation, iterative reconstruction, and/or weight based adjustment of the mA/kV was utilized to reduce the radiation dose to as low as reasonably achievable. 3D reconstructed images were performed on a separate workstation and provided for review. COMPARISON: CT head 04/12/2019 HISTORY: ORDERING SYSTEM PROVIDED HISTORY: STROKE TECHNOLOGIST PROVIDED HISTORY: Ordering Physician Provided Reason for Exam: STROKE S/P CABG Additional signs and symptoms: PT INTUBATED FINDINGS: CTA NECK: AORTIC ARCH/ARCH VESSELS: Moderate plaque identified involving the aortic arch is visualized. 3 vessel configuration of the aortic arch. Moderate severe stenosis identified involving the left common carotid artery origin and proximal common carotid artery and involving the left iliac artery. Motion artifact through the ostia great vessels challenge evaluation. There is 60-70% stenosis identified involving the proximal left common carotid artery and left subclavian artery. Mild-to-moderate focal stenosis identified involving the ostium of the right brachiocephalic artery measuring 30%. CAROTID ARTERIES: Left common carotid artery demonstrates moderate diffuse atherosclerotic disease with focal stenosis involving the left ostium measuring 60-70%. Right common carotid artery noted is mild diffuse atherosclerotic disease without hemodynamic stenosis or occlusion. . Right internal carotid artery demonstrates moderate diffuse atherosclerotic disease without up 60% stenosis per NASCET criteria.   Left internal carotid artery is occluded at its origin sent to level skull base. No dissection or arterial injury is seen on the right. . 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 Right vertebral artery demonstrates mild stenosis at this ostium but otherwise grossly patent. Christine Hoyles SOFT TISSUES: Large bilateral pleural fluid collections. Bibasilar airspace disease and atelectasis. Chronic emphysematous changes. .  No cervical or superior mediastinal lymphadenopathy. The visualized portion of the larynx and pharynx appear unremarkable. The parotid, submandibular and thyroid glands demonstrate no acute abnormality. BONES: The visualized osseous structures demonstrate multilevel degenerative changes. . CTA HEAD: ANTERIOR CIRCULATION: Right petrous segment, cavernous segments cervical segments are patent. Left petrous, cavernous segments are occluded. Reconstitution left carotid terminus likely due to combination of extracranial and intracranial collateral flow. The anterior cerebral and middle cerebral arteries demonstrate no focal stenosis. POSTERIOR CIRCULATION: Bilateral P comms. The posterior cerebral arteries demonstrate no focal stenosis. The vertebral and basilar arteries appear unremarkable. BRAIN: No mass effect or midline shift. No hyperdense extra-axial fluid collection. Chronic encephalomalacia left TOAN distribution involving both inferior frontal lobes. The gray-white differentiation appears grossly maintained. Air-fluid levels both sphenoid sinuses and posterior ethmoid air cells. Occlusion left ICA vertebral artery  extending to level skull base. Moderate atherosclerotic changes identified involving the remainder of the cervical arterial vasculature. Reconstitution left carotid terminus. Otherwise no hemodynamic stenosis or occlusion identified involving intracranial circulation .      Mri Brain Wo Contrast    Result Date: the right proximal tibia, noncompliant with follow-up with reported protruding hardware through the skin. Acute renal failure resolved     Coronary artery disease /recent cardiac arrest status post emergent cath and CABG on 4/7/2019 with delayed the chest wall incision closure. Acute CVA (cerebrovascular accident)     Moderate malnutrition ([address]      Recommendations:   Continue PO Doxycycline  for chronic suppression     PO Keflex  until 5/10/19 if the patient continued to improve. F follow CBC and renal function. Further recommendation as per hospital course. Thank you for allowing me to participate in the care of your patient. Please feel free to contact me with any questions or concerns.      Yosef Pearson MD

## 2019-05-05 NOTE — PROGRESS NOTES
[address]   ACUTE REHABILITATION OCCUPATIONAL THERAPY  DAILY NOTE    Date: 2019  Patient Name: Abe Hairston        MRN: [id number]    : 1965  (46 y.o.)  Gender: male   Referring Practitioner: Anahi Walden MD  Diagnosis: Acute CVA, cardiac arrest, CABG    Restrictions  Restrictions/Precautions: Cardiac, General Precautions, Surgical Protocols, Fall Risk(telesitter)  Sternal Precautions: Emergent CABG X4  Other position/activity restrictions: Fall Risk  Required Braces or Orthoses  Other: Heart Hugger Brace  Right Lower Extremity Brace: Ankle Foot Orthotics  RLE Brace Type: Profo boot and AFO  Left Lower Extremity Brace: Boot  LLE Brace Type: Profo boot       Pain Assessment  Patient Currently in Pain: Denies  Pain Assessment: 0-10  Pain Level: 4  Pain Type: Acute pain, Surgical pain  Pain Location: Leg  Pain Orientation: Right    Subjective  Subjective: Pleasant and cooperative   Comments: Wants to cut his hair and shave today    Patient Currently in Pain: Yes  Pain Level: 2  Pain Location: Chest  Pain Orientation: Mid    Overall Orientation Status: Within Functional Limits  Orientation Level: Oriented X4    Objective  Cognition  Overall Cognitive Status: Impaired  Memory: Decreased short term memory  Safety Judgement: Decreased awareness of need for safety  Insights: Decreased awareness of deficits  Sequencing and Organization: Assistance required to identify errors made    Supine to Sit: Stand by assistance  Sit to Supine: Stand by assistance         Balance  Balance  Sitting Balance: Supervision  Standing Balance: Stand by assistance    Standing Balance  Time: 15 minutes statically at sink    Activity: shaving and cutting hair   Comment: one LOB     Functional Mobility  Functional - Mobility Device: Rolling Walker  Activity: To/from bathroom  Assist Level: Contact guard assistance  Functional Mobility Comments: Pt unsteady d/t R foot drop, decreased safety awareness c RW mgmt. Gross Motor: Theracisoe for wrist and hand stengthening, bilateral use tasks   Fine Motor: Golf yogesh grasp and manipulaton, peg feeing from hand , general manipulaton of objects ;  Purdue pegs woth washer and barrel          Additional Activities: Other  Additional Activities: Purdue pegboard completed using B hands (each hand completed x1 row) to address and improve [address]. Pt tolerated well c increased time. EXERCISE  Type of ROM/Therapeutic Exercise  Type of ROM/Therapeutic Exercise: CHAR REINOSO  Comment: Pt completes BUE ex's to address overall strength and endurance for functional tasks. Pt completed ex's in seated position, slow and controlled, shortened rest breaks req. All planes of mvmt within cardiac restrictions. Improved tolerance noted this date. Exercises  Grasp/Release: 2nd setting hand gripper 3x15 reps B hands to improve  strength for functional tasks. Other: seated cone reaching intermittently c BUE crossing midline to address overall endurance. pt demo good understanding of when reach is breaking sternal precautions. Pt also participated in weighted bean bag retrieval from floor level using reacher to maintain good safety and sternal precautions. Pt states AE will be helpful in his room to retrieve items independently.            OT FIM:   Eatin - Feeds self with setup/supervision/cues and/or requires only setup/supervision/cues to perform tube feedings  Grooming: 3 - Able to perform 2-3 tasks (mod assist)(able to shave, but needed assistance to cut hair  )  Bathin - Able to bathe all 10 areas with setup/sup/cues  Dressing-Upper: 5 - Requires setup/supervision/cues and/or requires assist with presthesis/brace only  Dressing-Lower: 5 - Requires setup/supervision/cues and/or staff applies TEDS/prosthesis/brace only  Toiletin - Requires setup/supervision/cues  Toilet Transfer: 5 - Requires setup/supervision/cues  Tub Transfer: 0 - Activity does not occur  Shower Transfer:

## 2019-05-05 NOTE — PROGRESS NOTES
[place] [address] Internal Medicine    Progress Note    5/4/2019    9:27 PM    Name:   Gustavo Conley  MRN:     [id number]     Acct:      [de-identified]   Room:   [address] [place] [address] Day:  9  Admit Date:  4/25/2019  7:49 PM    PCP:   No primary care provider on file. Code Status:  Full Code    Subjective:     C/C: No chief complaint on file. Interval History Status: improved. HPI:   Patient had a cardiac arrest, found to have multivessel coronary artery disease, had CABG, patient had Stroke with Speech Difficulties   Infected Hard ware was Removed From Right leg     Review of Systems:     Constitutional:  negative for chills, fevers, sweats  Respiratory:  negative for cough, dyspnea on exertion, hemoptysis, shortness of breath, wheezing  Cardiovascular:  negative for chest pain, chest pressure/discomfort, lower extremity edema, palpitations  Gastrointestinal:  negative for abdominal pain, constipation, diarrhea, nausea, vomiting  Neurological:  Speech difficulties, foot drop present in right leg    Medications:      Allergies:  No Known Allergies    Current Meds:   Scheduled Meds:    cephALEXin  500 mg Oral 4 times per day    lisinopril  2.5 mg Oral Daily    furosemide  20 mg Oral Daily    metoprolol tartrate  25 mg Oral BID    insulin glargine  35 Units Subcutaneous Daily    traZODone  50 mg Oral Nightly    enoxaparin  40 mg Subcutaneous Daily    atorvastatin  40 mg Oral Nightly    clopidogrel  75 mg Oral Daily    famotidine  20 mg Oral BID    therapeutic multivitamin-minerals  1 tablet Oral Daily with breakfast    aspirin  81 mg Oral Daily    docusate  100 mg Oral Daily    doxycycline monohydrate  100 mg Oral 2 times per day    insulin lispro  0-12 Units Subcutaneous TID WC    insulin lispro  0-6 Units Subcutaneous Nightly    docusate sodium  100 mg Oral BID    polyethylene glycol  17 g Oral Daily     Continuous Infusions:    dextrose       PRN Meds: oxyCODONE **OR** [DISCONTINUED] oxyCODONE, dextrose, dextrose, glucagon (rDNA), glucose, magnesium hydroxide, albuterol, bisacodyl    Data:     Past Medical History:   has a past medical history of CAD (coronary artery disease), Cardiac arrest ([facility] Osteolysis, PEA (Pulseless electrical activity) ([facility] and [facility]peripheral vascular disease) [facility] Social History:   reports that he has quit smoking. He does not have any smokeless tobacco history on file. Family History: No family history on file. Vitals:  /63   Pulse 92   Temp 97.9 °F (36.6 °C) (Oral)   Resp 18   Ht 5' 8.11\" (1.73 m)   Wt 137 lb 9.6 oz (62.4 kg)   SpO2 97%   BMI 20.85 kg/m²   Temp (24hrs), Av.9 °F (36.6 °C), Min:97.9 °F (36.6 °C), Max:97.9 °F (36.6 °C)    Recent Labs     19  0647 19  1113 19  1555 19  1953   POCGLU 85 204* 101 190*       I/O (24Hr): Intake/Output Summary (Last 24 hours) at 2019  Last data filed at 2019  Gross per 24 hour   Intake --   Output 975 ml   Net -975 ml       Labs:    [unfilled]    Lab Results   Component Value Date/Time    SPECIAL NOT REPORTED 2019 04:08 PM     Lab Results   Component Value Date/Time    CULTURE (A) 2019 04:08 PM     STAPHYLOCOCCUS SPECIES, COAGULASE NEGATIVE SCANT GROWTH Susceptibilities have not been performed. Notify the laboratory within 7 days for further workup if clinically indicated.     CULTURE NO ANAEROBIC ORGANISMS ISOLATED AT 5 DAYS (A) 2019 04:08 PM       [unfilled]    Radiology:      Physical Examination:        General appearance:  alert, cooperative and no distress  Mental Status:  oriented to person, place and time and normal affect  Lungs:  clear to auscultation bilaterally, normal effort  Heart:  regular rate and rhythm, no murmur  Abdomen:  soft, nontender, nondistended, normal bowel sounds, no masses, hepatomegaly, splenomegaly  Extremities: Dressing present and right leg , Splint present   Skin:  no gross lesions, rashes, induration    Assessment:        Primary Problem  <principal problem not specified>    Active Hospital Problems    Diagnosis Date Noted    Cellulitis of right lower extremity [Y71.557]     Mod[facility] [E44.0] 04/26/2019    Acute CVA (cerebrovascular accident) ([facility] [I63.9] 04/25/2019    Acute cerebral infarction associated with systemic hypoxia or ischemia [I63.89]     Subacute osteomyelitis of right tibia ([facility] [K82.060]        Plan:        1. Cardiac arrest, status post CABG with multivessel coronary artery disease  2. Acute stroke. Speech difficulties, patient is on aspirin, Lipitor, Plavix  3. Congestive heart failure, both systolic and diastolic, ejection fraction is 40%, patient is on Lasix, Aldactone, lisinopril, Lopressor  4. Infected implant removed from right leg, she was on doxycycline  5. Diabetes, controlled, on sliding scale and long-acting insulin  6.  On DVT prophylaxis  7.     4/28/19  Doing better   BP controlled   Pain Better   Advising Carb Control diet     4/29   Has Readings of Low Blood Pressure   Hyponatremia   Seen By cardiologist   Diuretics and Antihypertensives on Hold   High Blood sugars   Not Compliant with Diet   Lantus to 35     4/30   Patient blood sugar is better  Started on Lopressor, will resume Lasix from tomorrow, blood pressure is better controlled  Wound on right leg is healthy    5/1  Patient doing better,  Liver enzymes are  improvement  Blood sugars better controlled  5/2  BP Better Controlled   Blood sugars Controlled   5/3  Patient BP is Controlled   DM - Controlled   Added Keflex per ID   5/4  Doing better   HTN - Controlled       Dori Ramsey MD  5/4/2019  9:27 PM

## 2019-05-05 NOTE — PROGRESS NOTES
Physical Therapy  [address]  Acute Rehabilitation Physical Therapy Progress Note    Date: 19  Patient Name: Carlos Vance       Room: 2081/6653-48  MRN: [id number]   Account: [de-identified]   : 1965  (46 y.o.) Gender: male     Referring Practitioner: Rosi Hinton MD  Diagnosis: Acute CVA, cardiac arrest, CABG  Past Medical History:  has a past medical history of CAD (coronary artery disease), Cardiac arrest ([facility] Osteolysis, PEA (Pulseless electrical activity) ([facility] and PVD (peripheral vascular disease) ([facility] Past Surgical History:   has a past surgical history that includes Cardiac catheterization (2019); Femur Surgery; Tonsillectomy; Coronary artery bypass graft (2019); Hardware Removal (Right, 2019); Tibia fracture surgery (Right); Coronary artery bypass graft (N/A, 2019); RECONSTRUCTIVE REPAIR STERNAL (N/A, 2019); Hardware Removal (Right, 2019); and REMOVE HARDWARE FEMUR (N/A, 2019). Additional Pertinent Hx: Pt admitted to [facility] ED 19 presenting with cardiac arrest and increased SOB. Pt underwent emergent CABG x4 19 with delayed closure on 19. Pt was intubated from 19 to 19. Pt also underwent screw removal from ORIF of R knee bedside on 19. Hardware removal of R tibia and I&D on 19. Pt admitted to [address] [place] [address] for intense rehabiliation on 19. 19 MRI shows R cerebellum infarct. CT on 19 shows Lower parietal infarct. Restrictions/Precautions  Restrictions/Precautions: Cardiac;General Precautions;Surgical Protocols; Fall Risk(telesitter)  Required Braces or Orthoses?: Yes(R AFO, R procare boot)  Required Braces or Orthoses  Right Lower Extremity Brace: Ankle Foot Orthotics  RLE Brace Type: Profo boot and AFO  Left Lower Extremity Brace: Boot  LLE Brace Type: Profo boot  Position Activity Restriction  Sternal Precautions: No Pushing; No Pulling;5# Lifting Restrictions  Sternal Precautions: Emergent CABG X4  Other position/activity restrictions: Fall Risk    Subjective: States he does not feel like he will be ready to return home on D/C, may need to spend some additional time at [facility]. Pt denies calf pain similar to previous day, able to tolerate 10 min. on NuStep, states he has received pain meds. Comments: Hair clipped during OT. Vital Signs  Pulse: 81(after amb)  Patient Position: Supine  Patient Currently in Pain: Yes  Pain Assessment: 0-10  Pain Level: 2  Pain Type: Surgical pain  Pain Location: Chest  Pain Orientation: Mid  Pain Descriptors: Aching  Response to Pain Intervention: Patient Satisfied     Oxygen Therapy  SpO2: 98 %(after amb)  Patient Observation  Observations: Vitals WNL throughout tx. Bed Mobility  Bridging: Stand by assistance(Pt c/o chest pain; exercise aborted after 2 reps)  Supine to Sit: Supervision  Sit to Supine: Supervision  Scooting: Supervision    Transfers:  Sit to Stand: Stand by assistance(Cues for hand position with good return, recall)  Stand to sit: Stand by assistance(Cues for hand position with good return, recall)  Stand Pivot Transfers: Stand by assistance(RW)   Comment: RW; emphasized hand position for stand to sit to prevent chest pain due to uncontrolled sit. Ambulation 1  Surface: level tile  Device: Rolling Walker  Other Apparatus: Right;Dorsiflex Assist  Assistance: Stand by assistance  Quality of Gait: High steppage on right LE to clear floor with right foot drop  Distance: 100'(9 steps after 50', no sitting rest break between)  Comments: Patient had tennis shoes with amb. today. Vitals WNL post amb. Ambulation 2  Surface - 2: level tile;ramp  Device 2: Rolling Walker  Other Apparatus 2: Right;Dorsiflex Assist  Assistance 2: Stand by assistance  Quality of Gait 2: High steppage on right LE to clear floor with right foot drop, 1 instance of toe catching, self-corrected without LOB.    Distance: 240'     Ambulation 3  Surface - 3: uneven;outdoors;ramp  Device 3: Rolling Walker  Other Apparatus 3: Right;Dorsiflex Assist  Assistance 3: Stand by assistance  Quality of Gait 3: High steppage on right LE to clear bricks with R foot drop, 2 instance of toe catching, self-correction without further LOB. Distance: 150'     Stairs/Curb  Stairs?: Yes  Stairs  # Steps : 9  Stairs Height: 8\"  Rails: Bilateral  Device: No Device  Other Apparatus: Right;Dorsiflexon assist  Assistance: Stand by assistance(Close SBA for safety)  Comment: Good sequencing, step-to pattern demo'd today with pt able to verbalize correct sequencing before attempt.  Vitals WNL post steps     FIMS:  TRANSFERS  Bed, Chair, Wheel Chair: 5 - Requires setup/supervision/cues   LOCOMOTION  Primary Mode: Walk  Distance Walked: 240'(RW, level + ramp, R DF assist)  Walk: 5 - Supervision Requires standby supervision or cuing to walk at least 150 feet  Stairs: 2- Maximal Assistance Performs 25-49% of the effort to go up and down 4 to 6 stairs and requires the assistance of one person only(reduced step number due to increased step height)     BALANCE Posture: Fair  Sitting - Static: Good(EOM, no back or UE support)  Sitting - Dynamic: Good(EOM, no back or UE support)  Standing - Static: Fair;+(RW)  Standing - Dynamic: Fair(RW)    EXERCISES    Other exercises?: Yes  Other exercises 1: Supine (B) LE ex x20 or greater (to pt tolerance); 2# (B)  Other exercises 2: Nustep x 10 min L4, p.m.(In p.m.; pt unable to tolerate in a.m. 2* R calf pain)  Other exercises 3: Seated B LE ther ex, 15 reps each, 1.5#/blue (mod) resistance band  Other exercises 6: UBE x 5 min each f/b(sitting EOM)  Other Activities  Other Activities: Community Outing(outside hospital lobby, 25 min.)     Activity Tolerance: Patient limited by endurance, Patient Tolerated treatment well     Patient Education  New Education Provided: Transfer safety  Learner:patient  Method: explanation       Outcome: acknowledged understanding of and demonstrated understanding     Current Treatment Recommendations: ROM, Strengthening, Functional Mobility Training, Endurance Training, Transfer Training, Gait Training, Stair training, Balance Training, Neuromuscular Re-education, Home Exercise Program, Safety Education & Training, Patient/Caregiver Education & Training, Equipment Evaluation, Education, & procurement    Conditions Requiring Skilled Therapeutic Intervention  Body structures, Functions, Activity limitations: Decreased functional mobility ; Decreased strength;Decreased endurance;Decreased coordination;Decreased safe awareness;Decreased cognition;Decreased ROM; Decreased balance  Patient Education: Transfer safety  REQUIRES PT FOLLOW UP: Yes  Discharge Recommendations: Home with assist PRN    Goals  Short term goals  Time Frame for Short term goals: 1 week  Short term goal 1: Pt to perform bed mobility mod I  Short term goal 2: Pt to perform transfers CGA with use of RW  Short term goal 3: Pt to amb  ft with RW and CGA with no rest break  Short term goal 4: Pt to ascend/descend 5 steps with bilateral handrail and min A x1  Long term goals  Time Frame for Long term goals : by discharge  Long term goal 1: Pt to perform bed mobility independent  Long term goal 2: pt to perform transfers with least restrictive DME and supervision  Long term goal 3: pt to amb with least restrictive -250 ft supervision  Long term goal 4: Pt to ascend/descend a flight of stairs with R handrail supervision  Long term goal 5: Pt to demonstrate understanding of HEP     05/05/19 1038 05/05/19 1433   PT Individual Minutes   Time In 0130 7113   Time Out 1115 1510   Minutes 40 39   PT Concurrent Minutes   Time In 1020  --    Time Out 1035  --    Minutes 15  --      Electronically signed by Amari Sharma PTA on 5/5/19 at 4:29 PM

## 2019-05-05 NOTE — PROGRESS NOTES
Vitals WNL post steps  FIMS:  TRANSFERS  Bed, Chair, Wheel Chair: 4 - Requires steadying assistance only <25% assist  and/or requires assist with one leg only   LOCOMOTION  Primary Mode: Walk  Distance Walked: 200'(RW + R DF assist)  Walk: 4 - Contact Guard/Minimal Assistance Requires up to Contact Guard or Minimal Assistance to walk at least 150 feet  Stairs: 4- Minimal Contact Assistance Perfoms 75% or more of the effort to go up and down one flight of stairs(15 4\"/6\" steps, B HRs, R DF assist)  BALANCE Posture: Fair  Sitting - Static: Good(EOM, no back or UE support)  Sitting - Dynamic: Good(EOM, no back or UE support)  Standing - Static: Fair;+(RW)  Standing - Dynamic: Fair(RW)         OT FIM:   Eatin - Feeds self with setup/supervision/cues and/or requires only setup/supervision/cues to perform tube feedings  Groomin - Able to perform 3-4 tasks with touching help(refused shaving today - touching assist for care )  Bathin - Able to bathe all 10 areas with setup/sup/cues  Dressing-Upper: 5 - Requires setup/supervision/cues and/or requires assist with presthesis/brace only  Dressing-Lower: 5 - Requires setup/supervision/cues and/or staff applies TEDS/prosthesis/brace only  Toiletin - Requires setup/supervision/cues  Toilet Transfer: 5 - Requires setup/supervision/cues  Primary Mode: Shower    Objective:  BP (!) 152/70   Pulse 81 Comment: after amb  Temp 98.3 °F (36.8 °C) (Oral)   Resp 15   Ht 5' 8.11\" (1.73 m)   Wt 137 lb 9.6 oz (62.4 kg)   SpO2 98% Comment: after amb  BMI 20.85 kg/m²       Alert, no distress. Good speech and language function. Lungs- no wheezing. Heart regular. Abdomen- soft, positive bowel sounds non-distended, non-tender. No calf tenderness or edema.   Motor strength-BUE-4+/5, LLE-4/5 hip and knee, DF trace, PF 3-/5, RLE-0/5 distally    Diagnostics:     CBC:   Recent Labs     19  0615   WBC 10.1   RBC 3.76*   HGB 11.0*   HCT 34.0*   MCV 90.5   RDW 15.0*   PLT 448     BMP:   Recent Labs     05/04/19  0615      K 4.4   CL 99   CO2 27   BUN 16   CREATININE 0.48*     BNP: No results for input(s): BNP in the last 72 hours. PT/INR: No results for input(s): PROTIME, INR in the last 72 hours. APTT: No results for input(s): APTT in the last 72 hours. CARDIAC ENZYMES: No results for input(s): CKMB, CKMBINDEX, TROPONINT in the last 72 hours.     Invalid input(s): CKTOTAL;3  FASTING LIPID PANEL:  Lab Results   Component Value Date    CHOL 56 04/17/2019    HDL 13 (L) 04/17/2019    TRIG 107 04/17/2019     LIVER PROFILE:   Recent Labs     05/04/19  0615   AST 26   ALT 43*   BILITOT 0.61   ALKPHOS 132*        Current Medications:   Current Facility-Administered Medications: oxyCODONE (ROXICODONE) immediate release tablet 5 mg, 5 mg, Oral, Q6H PRN **OR** [DISCONTINUED] oxyCODONE (ROXICODONE) immediate release tablet 10 mg, 10 mg, Oral, Q4H PRN  cephALEXin (KEFLEX) capsule 500 mg, 500 mg, Oral, 4 times per day  lisinopril (PRINIVIL;ZESTRIL) tablet 2.5 mg, 2.5 mg, Oral, Daily  furosemide (LASIX) tablet 20 mg, 20 mg, Oral, Daily  metoprolol tartrate (LOPRESSOR) tablet 25 mg, 25 mg, Oral, BID  insulin glargine (LANTUS) injection vial 35 Units, 35 Units, Subcutaneous, Daily  traZODone (DESYREL) tablet 50 mg, 50 mg, Oral, Nightly  enoxaparin (LOVENOX) injection 40 mg, 40 mg, Subcutaneous, Daily  atorvastatin (LIPITOR) tablet 40 mg, 40 mg, Oral, Nightly  clopidogrel (PLAVIX) tablet 75 mg, 75 mg, Oral, Daily  famotidine (PEPCID) tablet 20 mg, 20 mg, Oral, BID  therapeutic multivitamin-minerals 1 tablet, 1 tablet, Oral, Daily with breakfast  dextrose 5 % solution, 100 mL/hr, Intravenous, PRN  dextrose 50 % solution 12.5 g, 12.5 g, Intravenous, PRN  glucagon (rDNA) injection 1 mg, 1 mg, Intramuscular, PRN  glucose (GLUTOSE) 40 % oral gel 15 g, 15 g, Oral, PRN  magnesium hydroxide (MILK OF MAGNESIA) 400 MG/5ML suspension 30 mL, 30 mL, Oral, Daily PRN  albuterol (PROVENTIL) nebulizer solution 2.5 mg, 2.5 mg, Nebulization, Q6H PRN  aspirin chewable tablet 81 mg, 81 mg, Oral, Daily  docusate (COLACE) 50 MG/5ML liquid 100 mg, 100 mg, Oral, Daily  doxycycline monohydrate (MONODOX) capsule 100 mg, 100 mg, Oral, 2 times per day  insulin lispro (HUMALOG) injection vial 0-12 Units, 0-12 Units, Subcutaneous, TID WC  insulin lispro (HUMALOG) injection vial 0-6 Units, 0-6 Units, Subcutaneous, Nightly  bisacodyl (DULCOLAX) suppository 10 mg, 10 mg, Rectal, Daily PRN  docusate sodium (COLACE) capsule 100 mg, 100 mg, Oral, BID  polyethylene glycol (GLYCOLAX) packet 17 g, 17 g, Oral, Daily      Impression/Plan:  Patient is a 59-year-old male with ADL and mobility dysfunction secondary to cardiac arrest, CABG, CVA, infected right lower extremity hardware, metabolic encephalopathy and respiratory failure    1. Continue acute inpatient rehab to work on improving functional deficits. Discharge plan 5/8/2019. Patient agreeable most likely will be discharged to skilled nursing facility due to limited support. 2. CV-cardiac arrest CAD, myopathy, CABG ×4 on 4/7/2019. Appreciate cardiology follow-up. 3. Pulmonary-patient with respiratory arrest on admission at [facility]. Jose's was extubated on 4/19/2019. Patient was weaned from oxygen. Continue to monitor. Doing well. Patient had encephalopathy slept better with trazodone Seroquel discontinued on 4/30/2019.  4. ID-history of chronic osteomyelitis of the leg with hardware removal on 4/22/2019. Residual right foot drop. Weightbearing as tolerated. On doxycycline 60 days for chronic suppression with leukocytosis which is improving. Keflex until 5/10/2019. Continue dressing changes/wound care. 5. MSK-Patient has follow-up with Dr. Brook Gastelum next week. 6. Hyponatremia resolved-135 on 5/4/2019.  7. Anemia-stable at 11 on 5/4/2019. Continue to monitor  8. Endocrine-history of diabetes on insulin sliding scale glucose as above.   Continue monitoring. 9. Neurology- patient to follow-up with Dr. Brian Caputo on 5/21/2019 for decision on stenting patient on aspirin and Plavix and Lipitor. 10. Pain-on oxycodone consider taper. 11. Wounds-multiple wounds plaque and he'll PRAFO bilateral lower extremities to elevate heels off bed. Patient on Doxy and Keflex. 12. DVT prophylaxis Lovenox. 13. Internal medicine and infectious disease following. Electronically signed by Matthew Jackson MD on 5/5/2019 at 11:07 AM      This note is created with the assistance of a speech recognition program.  While intending to generate a document that actually reflects the content of the visit, the document can still have some errors including those of syntax and sound a like substitutions which may escape proof reading.   In such instances, actual meaning can be extrapolated by contextual diversion.

## 2019-05-05 NOTE — PLAN OF CARE
Problem: Falls - Risk of:  Goal: Will remain free from falls  Description  Will remain free from falls  Outcome: Met This Shift     No falls or injuries sustained at this time. Pt forgetful of limitations and does occasionally attempt to get out of bed without nursing assistance. Telesitter remains in place as a precaution. Call light within reach. Pt. reoriented to surroundings and reminded to use call light with each nurse/patient interaction. Side rails up x 2. Nonskid footwear remains on. Bed in low and locked position. Hourly nursing rounds made. Pt. room located close to nurse's station. Bed alarm remains engaged throughout the shift as a precaution. Problem: Risk for Impaired Skin Integrity  Goal: Tissue integrity - skin and mucous membranes  Description  Structural intactness and normal physiological function of skin and  mucous membranes. Outcome: Ongoing  Skin assessment completed this shift. Nutrition and Hydration status assessed with adequate intake. Yash Score as charted. SPR Overlay remains intact and inflated to patient's bed throughout the shift. Bilateral heels remain elevated on pillows throughout the shift. Right Prafo boot in use when pt is in bed for suspected deep tissue injury to Right Heel. Patient able to reposition self for comfort and to prevent breakdown. Patient verbalizes understanding of pressure ulcer prevention measures. Skin integrity maintained. No new skin breakdown noted. Shana / Incontinence care assisted with as needed throughout the shift. Sacral Mepilex remains intact. Pt with multiple BLE incisions/wounds. Midline sternal incision is well approximated, REGINE. See LDA documentation. Wound Care nurse following. Problem: Pain:  Goal: Pain level will decrease  Description  Pain level will decrease  Outcome: Ongoing  Pain assessment performed this shift. Pt. able to rest after the use of pain medication.   Patient medicated with Roxicodone for c/o incisional

## 2019-05-05 NOTE — PROGRESS NOTES
[facility]    Progress Note    5/5/2019    1:57 PM    Name:   Jonny Mullen  MRN:     [id number]     Acct:      [de-identified]   Room:   [address] [place] [address] Day:  10  Admit Date:  4/25/2019  7:49 PM    PCP:   No primary care provider on file. Code Status:  Full Code    Subjective:     C/C: No chief complaint on file. Interval History Status: improved. HPI:   Patient had a cardiac arrest, found to have multivessel coronary artery disease, had CABG, patient had Stroke with Speech Difficulties   Infected Hard ware was Removed From Right leg     Review of Systems:     Constitutional:  negative for chills, fevers, sweats  Respiratory:  negative for cough, dyspnea on exertion, hemoptysis, shortness of breath, wheezing  Cardiovascular:  negative for chest pain, chest pressure/discomfort, lower extremity edema, palpitations  Gastrointestinal:  negative for abdominal pain, constipation, diarrhea, nausea, vomiting  Neurological:  Speech difficulties, foot drop present in right leg    Medications:      Allergies:  No Known Allergies    Current Meds:   Scheduled Meds:    [START ON 5/6/2019] insulin glargine  32 Units Subcutaneous Daily    cephALEXin  500 mg Oral 4 times per day    lisinopril  2.5 mg Oral Daily    furosemide  20 mg Oral Daily    metoprolol tartrate  25 mg Oral BID    traZODone  50 mg Oral Nightly    enoxaparin  40 mg Subcutaneous Daily    atorvastatin  40 mg Oral Nightly    clopidogrel  75 mg Oral Daily    famotidine  20 mg Oral BID    therapeutic multivitamin-minerals  1 tablet Oral Daily with breakfast    aspirin  81 mg Oral Daily    docusate  100 mg Oral Daily    doxycycline monohydrate  100 mg Oral 2 times per day    insulin lispro  0-12 Units Subcutaneous TID     insulin lispro  0-6 Units Subcutaneous Nightly    docusate sodium  100 mg Oral BID    polyethylene glycol  17 g Oral Daily     Continuous Infusions:    dextrose       PRN Meds: oxyCODONE **OR** [DISCONTINUED] oxyCODONE, dextrose, dextrose, glucagon (rDNA), glucose, magnesium hydroxide, albuterol, bisacodyl    Data:     Past Medical History:   has a past medical history of CAD (coronary artery disease), Cardiac arrest ([facility] Osteolysis, PEA (Pulseless electrical activity) ([facility] and P[facility]peripheral vascular disease) [facility] Social History:   reports that he has quit smoking. He does not have any smokeless tobacco history on file. Family History: No family history on file. Vitals:  BP (!) 152/70   Pulse 81 Comment: after amb  Temp 98.3 °F (36.8 °C) (Oral)   Resp 15   Ht 5' 8.11\" (1.73 m)   Wt 137 lb 9.6 oz (62.4 kg)   SpO2 98% Comment: after amb  BMI 20.85 kg/m²   Temp (24hrs), Av.1 °F (36.7 °C), Min:97.9 °F (36.6 °C), Max:98.3 °F (36.8 °C)    Recent Labs     19  1555 19  1953 19  0645 19  1118   POCGLU 101 190* 72* 161*       I/O (24Hr): Intake/Output Summary (Last 24 hours) at 2019 1357  Last data filed at 2019 1941  Gross per 24 hour   Intake --   Output 975 ml   Net -975 ml       Labs:    [unfilled]    Lab Results   Component Value Date/Time    SPECIAL NOT REPORTED 2019 04:08 PM     Lab Results   Component Value Date/Time    CULTURE (A) 2019 04:08 PM     STAPHYLOCOCCUS SPECIES, COAGULASE NEGATIVE SCANT GROWTH Susceptibilities have not been performed. Notify the laboratory within 7 days for further workup if clinically indicated.     CULTURE NO ANAEROBIC ORGANISMS ISOLATED AT 5 DAYS (A) 2019 04:08 PM       [unfilled]    Radiology:      Physical Examination:        General appearance:  alert, cooperative and no distress  Mental Status:  oriented to person, place and time and normal affect  Lungs:  clear to auscultation bilaterally, normal effort  Heart:  regular rate and rhythm, no murmur  Abdomen:  soft, nontender, nondistended, normal bowel sounds, no masses, hepatomegaly, splenomegaly  Extremities: Dressing present and right leg , Splint present   Skin:  no gross lesions, rashes, induration    Assessment:        Primary Problem  <principal problem not specified>    Active Hospital Problems    Diagnosis Date Noted    Cellulitis of right lower extremity [U19.241]     [facility] [E44.0] 04/26/2019    Acute CVA (cerebrovascular accident) [facility] [I63.9] 04/25/2019    Acute cerebral infarction associated with systemic hypoxia or ischemia [I63.89]     Subacute osteomyelitis of right tibia ([facility] [A35.799]        Plan:        1. Cardiac arrest, status post CABG with multivessel coronary artery disease  2. Acute stroke. Speech difficulties, patient is on aspirin, Lipitor, Plavix  3. Congestive heart failure, both systolic and diastolic, ejection fraction is 40%, patient is on Lasix, Aldactone, lisinopril, Lopressor  4. Infected implant removed from right leg, she was on doxycycline  5. Diabetes, controlled, on sliding scale and long-acting insulin  6.  On DVT prophylaxis  7.     4/28/19  Doing better   BP controlled   Pain Better   Advising Carb Control diet     4/29   Has Readings of Low Blood Pressure   Hyponatremia   Seen By cardiologist   Diuretics and Antihypertensives on Hold   High Blood sugars   Not Compliant with Diet   Lantus to 35     4/30   Patient blood sugar is better  Started on Lopressor, will resume Lasix from tomorrow, blood pressure is better controlled  Wound on right leg is healthy    5/1  Patient doing better,  Liver enzymes are  improvement  Blood sugars better controlled  5/2  BP Better Controlled   Blood sugars Controlled   5/3  Patient BP is Controlled   DM - Controlled   Added Keflex per ID   5/5  Readings of Low Blood sugars   Reducing dose of Lantus by 3 units         Rosita Raya MD  5/5/2019  1:57 PM

## 2019-05-06 LAB
GLUCOSE BLD-MCNC: 116 MG/DL (ref 75–110)
GLUCOSE BLD-MCNC: 119 MG/DL (ref 75–110)
GLUCOSE BLD-MCNC: 156 MG/DL (ref 75–110)
GLUCOSE BLD-MCNC: 230 MG/DL (ref 75–110)
GLUCOSE BLD-MCNC: 63 MG/DL (ref 75–110)

## 2019-05-06 PROCEDURE — 97110 THERAPEUTIC EXERCISES: CPT

## 2019-05-06 PROCEDURE — 99232 SBSQ HOSP IP/OBS MODERATE 35: CPT | Performed by: INTERNAL MEDICINE

## 2019-05-06 PROCEDURE — 99232 SBSQ HOSP IP/OBS MODERATE 35: CPT | Performed by: PHYSICAL MEDICINE & REHABILITATION

## 2019-05-06 PROCEDURE — 97127 HC SP THER IVNTJ W/FOCUS COG FUNCJ: CPT

## 2019-05-06 PROCEDURE — 1180000000 HC REHAB R&B

## 2019-05-06 PROCEDURE — 6370000000 HC RX 637 (ALT 250 FOR IP): Performed by: INTERNAL MEDICINE

## 2019-05-06 PROCEDURE — 97530 THERAPEUTIC ACTIVITIES: CPT

## 2019-05-06 PROCEDURE — 6370000000 HC RX 637 (ALT 250 FOR IP): Performed by: NURSE PRACTITIONER

## 2019-05-06 PROCEDURE — 6360000002 HC RX W HCPCS: Performed by: NURSE PRACTITIONER

## 2019-05-06 PROCEDURE — 97535 SELF CARE MNGMENT TRAINING: CPT

## 2019-05-06 PROCEDURE — 97116 GAIT TRAINING THERAPY: CPT

## 2019-05-06 PROCEDURE — 6370000000 HC RX 637 (ALT 250 FOR IP): Performed by: PHYSICAL MEDICINE & REHABILITATION

## 2019-05-06 PROCEDURE — 6370000000 HC RX 637 (ALT 250 FOR IP): Performed by: STUDENT IN AN ORGANIZED HEALTH CARE EDUCATION/TRAINING PROGRAM

## 2019-05-06 PROCEDURE — 82947 ASSAY GLUCOSE BLOOD QUANT: CPT

## 2019-05-06 PROCEDURE — 97150 GROUP THERAPEUTIC PROCEDURES: CPT

## 2019-05-06 RX ORDER — INSULIN GLARGINE 100 [IU]/ML
24 INJECTION, SOLUTION SUBCUTANEOUS DAILY
Status: DISCONTINUED | OUTPATIENT
Start: 2019-05-07 | End: 2019-05-09 | Stop reason: HOSPADM

## 2019-05-06 RX ADMIN — MULTIPLE VITAMINS W/ MINERALS TAB 1 TABLET: TAB at 07:56

## 2019-05-06 RX ADMIN — CEPHALEXIN 500 MG: 500 CAPSULE ORAL at 06:08

## 2019-05-06 RX ADMIN — OXYCODONE HYDROCHLORIDE 5 MG: 5 TABLET ORAL at 12:40

## 2019-05-06 RX ADMIN — TRAZODONE HYDROCHLORIDE 50 MG: 50 TABLET ORAL at 21:09

## 2019-05-06 RX ADMIN — INSULIN GLARGINE 32 UNITS: 100 INJECTION, SOLUTION SUBCUTANEOUS at 07:57

## 2019-05-06 RX ADMIN — OXYCODONE HYDROCHLORIDE 5 MG: 5 TABLET ORAL at 21:09

## 2019-05-06 RX ADMIN — METOPROLOL TARTRATE 25 MG: 25 TABLET ORAL at 21:09

## 2019-05-06 RX ADMIN — FAMOTIDINE 20 MG: 20 TABLET ORAL at 07:56

## 2019-05-06 RX ADMIN — DOCUSATE SODIUM 100 MG: 50 LIQUID ORAL at 07:59

## 2019-05-06 RX ADMIN — DOXYCYCLINE 100 MG: 100 CAPSULE ORAL at 07:56

## 2019-05-06 RX ADMIN — CLOPIDOGREL BISULFATE 75 MG: 75 TABLET ORAL at 07:56

## 2019-05-06 RX ADMIN — CEPHALEXIN 500 MG: 500 CAPSULE ORAL at 12:40

## 2019-05-06 RX ADMIN — DOXYCYCLINE 100 MG: 100 CAPSULE ORAL at 21:10

## 2019-05-06 RX ADMIN — ATORVASTATIN CALCIUM 40 MG: 40 TABLET, FILM COATED ORAL at 21:10

## 2019-05-06 RX ADMIN — FUROSEMIDE 20 MG: 20 TABLET ORAL at 07:57

## 2019-05-06 RX ADMIN — OXYCODONE HYDROCHLORIDE 5 MG: 5 TABLET ORAL at 01:41

## 2019-05-06 RX ADMIN — LISINOPRIL 2.5 MG: 5 TABLET ORAL at 07:56

## 2019-05-06 RX ADMIN — ASPIRIN 81 MG 81 MG: 81 TABLET ORAL at 07:57

## 2019-05-06 RX ADMIN — ENOXAPARIN SODIUM 40 MG: 100 INJECTION SUBCUTANEOUS at 07:57

## 2019-05-06 RX ADMIN — DOCUSATE SODIUM 100 MG: 100 CAPSULE, LIQUID FILLED ORAL at 21:10

## 2019-05-06 RX ADMIN — METOPROLOL TARTRATE 25 MG: 25 TABLET ORAL at 07:56

## 2019-05-06 RX ADMIN — INSULIN LISPRO 2 UNITS: 100 INJECTION, SOLUTION INTRAVENOUS; SUBCUTANEOUS at 12:34

## 2019-05-06 RX ADMIN — CEPHALEXIN 500 MG: 500 CAPSULE ORAL at 17:44

## 2019-05-06 RX ADMIN — FAMOTIDINE 20 MG: 20 TABLET ORAL at 21:09

## 2019-05-06 RX ADMIN — INSULIN LISPRO 2 UNITS: 100 INJECTION, SOLUTION INTRAVENOUS; SUBCUTANEOUS at 21:07

## 2019-05-06 ASSESSMENT — PAIN SCALES - GENERAL
PAINLEVEL_OUTOF10: 0
PAINLEVEL_OUTOF10: 0
PAINLEVEL_OUTOF10: 5
PAINLEVEL_OUTOF10: 5
PAINLEVEL_OUTOF10: 0
PAINLEVEL_OUTOF10: 0
PAINLEVEL_OUTOF10: 3
PAINLEVEL_OUTOF10: 0

## 2019-05-06 ASSESSMENT — PAIN DESCRIPTION - ORIENTATION: ORIENTATION: RIGHT

## 2019-05-06 ASSESSMENT — PAIN DESCRIPTION - LOCATION: LOCATION: LEG

## 2019-05-06 ASSESSMENT — PAIN DESCRIPTION - PAIN TYPE: TYPE: CHRONIC PAIN

## 2019-05-06 NOTE — PROGRESS NOTES
Right;Dorsiflexon assist  Assistance: Stand by assistance(Close SBA for safety; catches R forefoot on step often.)  Comment: Good sequencing, step-to pattern demo'd today with pt able to verbalize correct sequencing before attempt. Vitals WNL post steps                            FIMS:      TRANSFERS  Bed, Chair, Wheel Chair: 5 - Requires setup/supervision/cues   LOCOMOTION  Primary Mode: Walk  Distance Walked: 200 ft  Walk: 5 - Supervision Requires standby supervision or cuing to walk at least 150 feet  Stairs: 2- Maximal Assistance Performs 25-49% of the effort to go up and down 4 to 6 stairs and requires the assistance of one person only       BALANCE Posture: Fair  Sitting - Static: Good(EOM, no back or UE support)  Sitting - Dynamic: Good(EOM, no back or UE support)  Standing - Static: Fair;+(RW)  Standing - Dynamic: Fair(RW)    EXERCISES    Other exercises?: Yes  Other exercises 1: Supine (B) LE ex x20 with 2#  Other exercises 2: Nustep x 10 min L4   Other exercises 3: Seated B LE ther ex, 20 reps each, 1.5#/blue (mod) resistance band  Other exercises 4: UBE x 5 min each f/b  Other Activities  Other Activities: Community Outing(With recreational therapist)        Activity Tolerance: Patient limited by endurance  PT Equipment Recommendations  Equipment Needed: (TBD)  Other: TBD       Patient Education  New Education Provided: Transfer safety and pacing.   Learner:patient  Method: demonstration and explanation       Outcome: needs reinforcement     Current Treatment Recommendations: ROM, Strengthening, Functional Mobility Training, Endurance Training, Transfer Training, Gait Training, Stair training, Balance Training, Neuromuscular Re-education, Home Exercise Program, Safety Education & Training, Patient/Caregiver Education & Training, Equipment Evaluation, Education, & procurement    Conditions Requiring Skilled Therapeutic Intervention  Body structures, Functions, Activity limitations: Decreased functional

## 2019-05-06 NOTE — PATIENT CARE CONFERENCE
[facility]   ACUTE REHABILITATION  TEAM CONFERENCE NOTE  Date: 19  Patient Name: Michelle Gallego       Room: 1754/4477-24  MRN: [id number]       : 1965  (46 y.o.)     Gender: male   Referring Practitioner: Dr. MITCHELL [facility]  Diagnosis: Cardiac Arrest and stroke    NURSING  FIMS:  Bladder: 5 - Voids, but staff empties urinal/BSC and/or requires setup/sup/cues to urinate (e.g. timed voids/self cath) (no accidents, no touching) (help w/I&O cath)  Bladder Level of Assistance: 5- Requires helper to provide or empty urinal, bedside commode, or requires set-up/cues to empty bladder (ie. helper provides self-catheter supplies )  Bladder Frequency of Accidents: 6 - No accidents,uses device like urinal, bedpan, absorbant pad, or requires medication to manage bladder  Bowel: 6 - Uses toilet independently with device or oral medication(s)  Bowel Level of Assistance: 6- Requires device like bedpan, diaper, bedside commode, but patient obtains and empties own device including colostomy.   Or requires bowel management medication including stool softeners, laxatives, inserts own suppository  Bowel Frequency of Accidents: 6 - No accidents, uses device like bedpan, diaper, bedside commode colostomy, or requires medication to manage bowels   Bladder  Continent-stress incontinence sometimes  Bowel   Continent  Intervention    None     Wounds/Incisions/Ulcers: Incision healing well  Medication Education Program: Patient able to manage medications and being educated by nursing  Pain: Patient's pain is currently controlled with -  yonis 5mg Q6 prn    Fall Risk:  Falling star program initiated    PHYSICAL THERAPY    Bed Mobility  Bridging: Stand by assistance  Supine to Sit: Supervision  Sit to Supine: Supervision  Scooting: Supervision      Transfers:  Sit to Stand: Stand by assistance(Cues for hand position with good return, recall)  Stand to sit: Stand by assistance(Cues for hand position with good return, recall)  Bed TBD    Assessment: Pt admitted to [address] after stay at [facility] for cardiac arrest. Pt with sternal precautions and FWB. Pt able to perform bed mobility SBA except sup to sit min A due to surgical pain. Pt completed transfers and gait min A x1 with use of RW. sao2 ranged from 92-94% during activity and amb on room air. verbal cues were needed for hand placement during transfers for pt. Pt with more carryover for hand placement P.M session compared to A.M session. Pt tolerated P.M therapy well.      Goals  Short term goals  Time Frame for Short term goals: 1 week  Short term goal 1: Pt to perform bed mobility mod I  Short term goal 2: Pt to perform transfers CGA with use of RW  Short term goal 3: Pt to amb  ft with RW and CGA with no rest break  Short term goal 4: Pt to ascend/descend 5 steps with bilateral handrail and min A x1        OCCUPATIONAL THERAPY  FIMS:  Eatin - Feeds self with adaptive equipment/dentures and/or feeds self with modified diet and/or performs own tube feeding  Groomin - Requires setup/cues to do all tasks(SBA sinkside. )  Bathin - Able to bathe all 10 areas with setup/sup/cues(set up, SBA/supervision )  Dressing-Upper: 5 - Requires setup/supervision/cues and/or requires assist with presthesis/brace only(set up )  Dressing-Lower: 5 - Requires setup/supervision/cues and/or staff applies TEDS/prosthesis/brace only(set up, SBA to pull up )  Toiletin - Did not occur  Toilet Transfer: 0 - Did not occur  Primary Mode: Shower  Tub Transfer: 0 - Activity does not occur  Shower Transfer: 5 - Supervision, set-up, cues(SBA)    Equipment Recommendations  Equipment Needed: (TBD)    Assessment: Pt completed balloon toss seated, remaining within sternal precautions during activtiy      Short term goals  Time Frame for Short term goals: in 7 days, pt will  Short term goal 1: perform functional transfers with CGA and LRD  Short term goal 2: perform LB ADL tasks with Min A   Short term goal 3: V/D Good understanding of AE and/or modified/compensatory techniques for increased safety and independence with self care and mobility tasks  Short term goal 4: V/D Good understanding of sternal precautions as they relate to self care and mobility tasks  Short term goal 5: increase standing tolerance to 5-7 minutes with 0-1 UE support during functional task  Short term goal 6: actively participate in 30 min therapeutic exercise/activity to promote increased safety and independence with self care and mobility tasks     SPEECH THERAPY  Supervision level memory and problem solving  Short Term Goal: mod I    RECREATIONAL THERAPY  Comment/Participation: Participating in unit program.  Involved in lunch group. NUTRITION  Weight: 145 lb 1.6 oz (65.8 kg) / Body mass index is 21.99 kg/m². Diet order: Carbohydrate controlled-dental soft. BS today: 116-156. Pt seen eating in the dining room taking 100% of lunch, recreational therapist states he always cleans his plate and drinks the supplement. Please see nutrition note for details.     SOCIAL WORK ASSESSMENT  Assessment: snf   Pre-Admission Status:  Lives With: Other (comment)(recover addicts)  Type of Home: House(Pt is a resident at a substance abuse living facility)  Home Layout: Multi-level, Laundry in basement  Home Access: Stairs to enter with rails  Entrance Stairs - Number of Steps: 22  Entrance Stairs - Rails: Right  Bathroom Shower/Tub: Walk-in shower  Bathroom Toilet: Handicap height  Bathroom Equipment: (unsure)  Bathroom Accessibility: Accessible  Home Equipment: (none)  ADL Assistance: Independent  Homemaking Assistance: Independent(has a cook at the house)  [employer] Responsibilities: Yes  Ambulation Assistance: Independent  Transfer Assistance: Independent  Active : Yes  Mode of Transportation: Truck  Occupation: Full time employment, Part time employment  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 Pt does not use any AD or DME. Family Education: No family available for education    Risk for Readmission: High 14>   Readmission Risk              Risk of Unplanned Readmission:        17         Critical Items: None          Problem / Barrier Intervention / Plan  Results   Impaired functional mobility Functional mobility training for safe transfers, gait and stairs.     Stairs- Multiple level home Stair training.     B Foot drop, R worse than Left.       Impaired cognition Cognitive retraining exercises     Multiple open wounds Wound care, PRAFO Boot for heel protection/foot positioining.     Altered Tolerance to ADL Tasks  Training in strategies to increase tolerance to tasks and safety during care                Functional FIM Gain  Admission Score:  53  Progress:  86  Goal:  107   `  Discharge Plan   Estimated Discharge Date: 5/8/19  Overnight or Day Pass: No  Factors facilitating achievement of predicted outcomes:  Motivated, Cooperative and Pleasant  Barriers to the achievement of predicted outcomes: Lower extremity weakness    Functional Goals at discharge:  Home alone Close supervision  Discharge therapy goals:  PT: Long term goals  Time Frame for Long term goals : by discharge  Long term goal 1: Pt to perform bed mobility independent  Long term goal 2: pt to perform transfers with least restrictive DME and supervision  Long term goal 3: pt to amb with least restrictive -250 ft supervision  Long term goal 4: Pt to ascend/descend a flight of stairs with R handrail supervision  Long term goal 5: Pt to demonstrate understanding of HEP  OT:Long term goals  Time Frame for Long term goals : by discharge, pt will  Long term goal 1: perform functional transfer and functional mobility with Mod I and LRD  Long term goal 2: perform BADL tasks with Mod I   Long term goal 3: demonstrate Good safety throughout all ADL/mobility tasks  Long term

## 2019-05-06 NOTE — DISCHARGE INSTR - COC
Continuity of Care Form    Patient Name: Michelle Gallego   :  1965  MRN:  [id number]    Admit date:  2019  Discharge date:  19    Code Status Order: Full Code   Advance Directives:   Advance Care Flowsheet Documentation     Date/Time Healthcare Directive Type of Healthcare Directive Copy in [address] Agent's Name Healthcare Agent's Phone Number    [phone]  No, patient does not have an advance directive for healthcare treatment -- -- -- -- --          Admitting Physician:  Christa Bazan MD  PCP: No primary care provider on file. Discharging Nurse:  [facility],[address] [facility] Unit/Room#: 0682/5677-46  Discharging Unit Phone Number: [phone]    Emergency Contact:   Extended Emergency Contact Information  Primary Emergency Contact: Raven Gutierrez, [address] [facility] Phone: [phone]  Mobile Phone: [phone]  Relation: Child    Past Surgical History:  Past Surgical History:   Procedure Laterality Date    CARDIAC CATHETERIZATION  2019    MVD-Stv    CORONARY ARTERY BYPASS GRAFT  04/07/2019    x4 per Dr. Ulysses Gandy, [facility] GRAFT N/A 2019    EMERGENT CPR,  CORONARY ARTERY BYPASS X4, ON PUMP ,CHEST LEFT OPEN; SWAN, ANGELA PER ANESTHESIA performed by Ford Bean MD at [address] [facility]      unsure if correct known tibial surgery    HARDWARE REMOVAL Right 2019    R Knee internal screw protruding through skin    HARDWARE REMOVAL Right 2019    HARDWARE REMOVAL RIGHT TIBIA,    RECONSTRUCTIVE REPAIR STERNAL N/A 2019    STERNAL  WOUND WASHOUT AND CLOSURE S/P CABG performed by Ford Bean MD at [facility] N/A 2019    HARDWARE REMOVAL RIGHT TIBIA, IRRIGATION AND DEBRIDEMENT [facility] performed by Ale Chung MD at [facility] [address] off roof, external fixation and internal hardware    TONSILLECTOMY         Immunization History: There is no immunization history on file for this patient. Active Problems:  Patient Active Problem List   Diagnosis Code    PAD (peripheral artery disease) ([facility]) I73.9    Subacute osteomyelitis of right tibia ([facility]) C01.621    Metabolic encephalopathy W59.24    Abnormal CT of the head R93.0    Hepatitis K75.9    Acute cerebral infarction associated with systemic hypoxia or ischemia I63.89    Multi infarct state I69.30    Carotid stenosis, bilateral I65.23    Right foot drop M21.371    Thrombocytosis (HCC) D47.3    Acute CVA (cerebrovascular accident) ([address] I63.9    Moderate malnutrition (HCC) E44.0    Cellulitis of right lower extremity L03.115       Isolation/Infection:   Isolation          No Isolation            Nurse Assessment:  Last Vital Signs: BP (!) 142/73   Pulse 96   Temp 97.9 °F (36.6 °C) (Oral)   Resp 15   Ht 5' 8.11\" (1.73 m)   Wt 145 lb 1.6 oz (65.8 kg)   SpO2 98%   BMI 21.99 kg/m²     Last documented pain score (0-10 scale): Pain Level: 5  Last Weight:   Wt Readings from Last 1 Encounters:   05/06/19 145 lb 1.6 oz (65.8 kg)     Mental Status:  oriented, alert and coherent    IV Access:  - None    Nursing Mobility/ADLs:  Walking   Assisted  Transfer  Assisted  Bathing  Assisted  Dressing  Assisted  Toileting  Assisted  Feeding  Independent  Med Admin  Assisted  Med Delivery   whole    Wound Care Documentation and Therapy:  Wound 04/07/19 Knee Right (Active)   Wound Image    4/26/2019  3:40 PM   Wound Non-Healing Surgical 5/1/2019  9:35 AM   Dressing Status Clean;Dry; Intact 5/6/2019  8:00 AM   Dressing Changed Changed/New 5/6/2019  8:00 AM   Dressing/Treatment ABD; Dry Dressing;Vaseline gauze 5/6/2019  8:00 AM   Wound Cleansed Other (Comment) 5/1/2019  9:35 AM   Dressing Change Due 05/07/19 5/6/2019  8:00 AM   Wound Length (cm) 0.8 cm 4/26/2019  3:40 PM   Wound Width (cm) 0.7 cm 4/26/2019  3:40 PM   Wound Depth (cm) 1.3 cm 4/26/2019  3:40 PM   Wound Surface Area (cm^2) 0.56 cm^2 4/26/2019  3:40 PM   Wound Volume (cm^3) 0.73 cm^3 4/26/2019  3:40 PM   Wound Assessment Intact; Other (Comment) 5/6/2019  8:00 AM   Drainage Amount None 5/6/2019  8:00 AM   Drainage Description Sanguinous 5/1/2019  9:35 AM   Odor None 5/6/2019  8:00 AM   Margins OTONIEL 4/29/2019  8:18 PM   Shana-wound Assessment OTONIEL 5/6/2019  8:00 AM   Red%Wound Bed 100 4/26/2019  3:40 PM   Culture Taken No 4/22/2019  3:15 AM   Number of days: 28       Wound 04/09/19 Coccyx Inner small slit of purple on inner coccyx (Active)   Wound Pressure Stage  2 4/12/2019  8:00 PM   Dressing Status Clean;Dry 4/13/2019  4:00 AM   Dressing Changed Changed/New 4/11/2019  4:00 PM   Dressing/Treatment Foam 4/13/2019  4:00 AM   Dressing Change Due 04/14/19 4/11/2019  4:00 AM   Wound Length (cm) 2 cm 4/9/2019 12:18 PM   Wound Assessment Purple 4/13/2019  4:00 AM   Drainage Amount None 4/13/2019  4:00 AM   Odor None 4/13/2019  4:00 AM   Shana-wound Assessment Clean;Dry;Ecchymosis; Red 4/13/2019  4:00 AM   Purple%Wound Bed 100 4/9/2019  4:52 PM   Number of days: 27       Wound 04/12/19 Heel Right dark purple area  (Active)   Wound Deep tissue/Injury 5/4/2019 10:30 PM   Dressing Status Clean;Dry; Intact 5/6/2019  8:00 AM   Dressing Changed Changed/New 4/30/2019  3:19 PM   Dressing/Treatment Open to air 5/6/2019  8:00 AM   Dressing Change Due 05/04/19 5/4/2019  8:30 AM   Wound Length (cm) 5 cm 4/26/2019  3:40 PM   Wound Width (cm) 5.8 cm 4/26/2019  3:40 PM   Wound Depth (cm) 0 cm 4/26/2019  3:40 PM   Wound Surface Area (cm^2) 29 cm^2 4/26/2019  3:40 PM   Wound Volume (cm^3) 0 cm^3 4/26/2019  3:40 PM   Wound Assessment Dusky; Purple 5/6/2019  8:00 AM   Drainage Amount None 5/6/2019  8:00 AM   Odor None 5/1/2019  9:35 AM   Margins OTONIEL 4/28/2019  8:01 AM   Shana-wound Assessment Dark edges 5/6/2019  8:00 AM   Culture Taken No 4/21/2019  9:40 AM   Number of days: 23       Wound 04/13/19 Buttocks purple area to buttock and coccyx (Active)   Wound PM   Number of days: 9       Wound 04/26/19 Heel Left (Active)   Wound Image   4/26/2019  3:40 PM   Wound Deep tissue/Injury 5/4/2019 10:30 PM   Dressing Status Clean;Dry; Intact 5/3/2019  8:45 AM   Dressing/Treatment Open to air 5/6/2019  8:00 AM   Wound Length (cm) 2 cm 4/26/2019  3:40 PM   Wound Width (cm) 7 cm 4/26/2019  3:40 PM   Wound Depth (cm) 0 cm 4/26/2019  3:40 PM   Wound Surface Area (cm^2) 14 cm^2 4/26/2019  3:40 PM   Wound Volume (cm^3) 0 cm^3 4/26/2019  3:40 PM   Wound Assessment Dry; Intact; Purple 5/6/2019  8:00 AM   Drainage Amount None 5/6/2019  8:00 AM   Shana-wound Assessment Clean;Dry; Intact 5/6/2019  8:00 AM   Number of days: 9       Wound 04/26/19 Arm Medial Forearm (Active)   Wound Other 5/1/2019  9:35 AM   Dressing Status Clean;Dry; Intact 5/6/2019  8:00 AM   Dressing Changed Other (Comment) 5/3/2019  8:45 AM   Dressing/Treatment Foam 5/6/2019  8:00 AM   Wound Cleansed Other (Comment) 5/1/2019  9:35 AM   Wound Length (cm) 1.1 cm 4/26/2019  3:59 PM   Wound Width (cm) 1.2 cm 4/26/2019  3:59 PM   Wound Depth (cm) 0.1 cm 4/26/2019  3:59 PM   Wound Surface Area (cm^2) 1.32 cm^2 4/26/2019  3:59 PM   Wound Volume (cm^3) 0.13 cm^3 4/26/2019  3:59 PM   Wound Assessment OTONIEL 5/6/2019  8:00 AM   Drainage Amount None 5/6/2019  8:00 AM   Drainage Description Other (Comment) 5/1/2019  9:35 AM   Odor None 5/6/2019  8:00 AM   Margins Attached edges 4/29/2019  8:18 PM   Shana-wound Assessment Clean;Dry; Intact 5/6/2019  8:00 AM   Number of days: 9        Elimination:  Continence:   · Bowel: yes  · Bladder: Yes  Urinary Catheter: None   Colostomy/Ileostomy/Ileal Conduit: No       Date of Last BM: 5/9/19    Intake/Output Summary (Last 24 hours) at 5/6/2019 1423  Last data filed at 5/6/2019 0429  Gross per 24 hour   Intake --   Output 1290 ml   Net -1290 ml     I/O last 3 completed shifts:  In: -   Out: 0656 [Urine:1290]    Safety Concerns:      At Risk for Falls    Impairments/Disabilities:      None    Nutrition Therapy:  Current Nutrition Therapy:   - Oral Diet:  Dental Soft, Carb control    Routes of Feeding: Oral  Liquids: Thin Liquids  Daily Fluid Restriction: no  Last Modified Barium Swallow with Video (Video Swallowing Test): {Done Not Done BOSA:[id number]}    Treatments at the Time of Hospital Discharge:   Respiratory Treatments: ***  Oxygen Therapy:  is not on home oxygen therapy. Ventilator:    - No ventilator support    Rehab Therapies: Physical Therapy, Occupational Therapy and Speech/Language Therapy  Weight Bearing Status/Restrictions: No weight bearing restirctions  Other Medical Equipment (for information only, NOT a DME order):  walker  Other Treatments:Skilled nursing assessment per protocol and system specific. Medication education and monitoring. Home health care agency's  to evaluate patient two weeks prior to discharge from home health to determine post-discharge needs. Patient's personal belongings (please select all that are sent with patient):  None    RN SIGNATURE:  Electronically signed by Eduin Sinclair RN on 5/9/19 at 2:57 PM    CASE MANAGEMENT/SOCIAL WORK SECTION    Inpatient Status Date: ***    Readmission Risk Assessment Score:  Readmission Risk              Risk of Unplanned Readmission:        17           Discharging to Facility/ Eugene Robles, [address] [place] [address]  [phone]  After Hours [phone]    Dialysis Facility (if applicable)   · Name:  · Address:  · Dialysis Schedule:  · Phone:  · Fax:    / signature: Electronically signed by ADRIAN Neil, LSW on 5/6/19 at 2:23 PM    PHYSICIAN SECTION    Prognosis: Good    Condition at Discharge: Stable    Rehab Potential (if transferring to Rehab): Good    Recommended Labs or Other Treatments After Discharge: follow up appointment with Dr. Nunu Borrero to evaluate surgical site on right lower extremity where exposed hardware was present.       Physician Certification: I certify the above information and transfer of Radha Manjarrez  is necessary for the continuing treatment of the diagnosis listed and that he requires [facility] for less 30 days.      Update Admission H&P: No change in H&P    PHYSICIAN SIGNATURE:  Electronically signed by Isabel Randolph MD on 5/9/19 at 1:05 PM

## 2019-05-06 NOTE — PLAN OF CARE
Nutrition Problem: Inadequate oral intake  Intervention: Food and/or Nutrient Delivery: Continue current diet, Modify current ONS  Nutritional Goals: PO intake % of meals and supplements

## 2019-05-06 NOTE — PROGRESS NOTES
Speech Language Pathology  Speech Language Pathology  [facility]    Cognitive Treatment Note    Date: 5/6/2019  Patients Name: Gage Correa  MRN: [id number]  Diagnosis:   Patient Active Problem List   Diagnosis Code    PAD (peripheral artery disease) ([facility]) I73.9    Subacute osteomyelitis of right tibia ([facility] N43.435    Metabolic encephalopathy C04.48    Abnormal CT of the head R93.0    Hepatitis K75.9    Acute cerebral infarction associated with systemic hypoxia or ischemia I63.89    Multi infarct state I69.30    Carotid stenosis, bilateral I65.23    Right foot drop M21.371    Thrombocytosis (HCC) D47.3    Acute CVA (cerebrovascular accident) ([facility] I63.9    Moderate malnutrition ([facility] E44.0    Cellulitis of right lower extremity L03.115       Pain: 0/10    Cognitive Treatment    Treatment time: 5756-0344    Subjective: [x] Alert [x] Cooperative     [] Confused     [] Agitated    [] Lethargic    Objective/Assessment:  Attention: sometimes tangential, easily able to redirect. Recall:  Paragraph recall- 15%, 67% c cues (read x2). Picture detail recall- 90%. Organization: pt. Needed very min A when completing 5 and 6 step written sequencing. Problem Solving/Reasoning:     Other: Pt. Reports he feels his cognition is much better and friends have commented that they are glad his cognition is \"doing so good. \"      Plan:  [x] Continue ST services    [] Discharge from ST:      Discharge recommendations: [] Inpatient Rehab   [] [place]   [] Outpatient Therapy  [] Follow up at trauma clinic   [] Other:       Treatment completed by: Rosalina Tillman A.CCC/SLP

## 2019-05-06 NOTE — PROGRESS NOTES
Physical Medicine & Rehabilitation  Progress Note    Chief complaint:  Ambulatory and ADL dysfunction due to cardiac arrest, CABG, CVA, infected right lower extremity hardware, metabolic encephalopathy and respiratory failure        Subjective:      Positive bowel movement on Sunday. No new complaints. Still with occasional chest discomfort with movement. Relieved with sitting still and hugging pillow    ROS:  Denies fevers, chills, sweats. No chest pain, palpitations, lightheadedness. Denies coughing, wheezing or shortness of breath. Denies abdominal pain, nausea, diarrhea or constipation. No new areas of joint pain. Denies new areas of numbness or weakness. Denies new anxiety or depression issues. No new skin problems. Rehabilitation:    PT:  Bed Mobility  Bridging: Stand by assistance(Pt c/o chest pain; exercise aborted after 2 reps)  Supine to Sit: Supervision  Sit to Supine: Supervision  Scooting: Supervision     Transfers:  Sit to Stand: Stand by assistance(Cues for hand position with good return, recall)  Stand to sit: Stand by assistance(Cues for hand position with good return, recall)  Stand Pivot Transfers: Stand by assistance(RW)   Comment: RW; emphasized hand position for stand to sit to prevent chest pain due to uncontrolled sit. Ambulation 1  Surface: level tile  Device: Rolling Walker  Other Apparatus: Right;Dorsiflex Assist  Assistance: Stand by assistance  Quality of Gait: High steppage on right LE to clear floor with right foot drop  Distance: 100'(9 steps after 50', no sitting rest break between)  Comments: Patient had tennis shoes with amb. today. Vitals WNL post amb.      Ambulation 2  Surface - 2: level tile;ramp  Device 2: Rolling Walker  Other Apparatus 2: Right;Dorsiflex Assist  Assistance 2: Stand by assistance  Quality of Gait 2: High steppage on right LE to clear floor with right foot drop, 1 instance of toe catching, self-corrected without LOB.    Distance: 12'    Ambulation 3  Surface - 3: uneven;outdoors;ramp  Device 3: Rolling Walker  Other Apparatus 3: Right;Dorsiflex Assist  Assistance 3: Stand by assistance  Quality of Gait 3: High steppage on right LE to clear bricks with R foot drop, 2 instance of toe catching, self-correction without further LOB. Distance: 150'      Stairs/Curb  Stairs?: Yes  Stairs  # Steps : 9  Stairs Height: 8\"  Rails: Bilateral  Device: No Device  Other Apparatus: Right;Dorsiflexon assist  Assistance: Stand by assistance(Close SBA for safety)  Comment: Good sequencing, step-to pattern demo'd today with pt able to verbalize correct sequencing before attempt.  Vitals WNL post steps  FIMS:  TRANSFERS  Bed, Chair, Wheel Chair: 5 - Requires setup/supervision/cues   LOCOMOTION  Primary Mode: Walk  Distance Walked: 240'(RW, level + ramp, R DF assist)  Walk: 5 - Supervision Requires standby supervision or cuing to walk at least 150 feet  Stairs: 2- Maximal Assistance Performs 25-49% of the effort to go up and down 4 to 6 stairs and requires the assistance of one person only(reduced step number due to increased step height)  BALANCE Posture: Fair  Sitting - Static: Good(EOM, no back or UE support)  Sitting - Dynamic: Good(EOM, no back or UE support)  Standing - Static: Fair;+(RW)  Standing - Dynamic: Fair(RW)          OT FIM:   Eatin - Feeds self with setup/supervision/cues and/or requires only setup/supervision/cues to perform tube feedings  Grooming: 3 - Able to perform 2-3 tasks (mod assist)(able to shave, but needed assistance to cut hair  )  Bathin - Able to bathe all 10 areas with setup/sup/cues  Dressing-Upper: 5 - Requires setup/supervision/cues and/or requires assist with presthesis/brace only  Dressing-Lower: 5 - Requires setup/supervision/cues and/or staff applies TEDS/prosthesis/brace only  Toiletin - Requires setup/supervision/cues  Toilet Transfer: 5 - Requires setup/supervision/cues  Tub Transfer: 0 - Activity does not occur  Shower Transfer: 0 - Activity does not occur     Social Interaction: 6 - Patient requires medication for mood and/or effect  Problem Solvin - Patient solves simple/routine tasks 75-90%+   Memory: 5 - Patient requires prompting with stress/unfamiliar situations      Objective:  BP (!) 142/73   Pulse 96   Temp 97.9 °F (36.6 °C) (Oral)   Resp 15   Ht 5' 8.11\" (1.73 m)   Wt 137 lb 9.6 oz (62.4 kg)   SpO2 98%   BMI 20.85 kg/m²       Alert, no distress. Good speech and language function. Lungs- no wheezing. Heart regular. Abdomen- soft, positive bowel sounds non-distended, non-tender. No calf tenderness or edema. Motor strength-BUE-4+/5, LLE-4/5 hip and knee, DF trace, PF 3-/5, RLE-0/5 distally    Diagnostics:     CBC:   Recent Labs     19  0615   WBC 10.1   RBC 3.76*   HGB 11.0*   HCT 34.0*   MCV 90.5   RDW 15.0*        BMP:   Recent Labs     19  0615      K 4.4   CL 99   CO2 27   BUN 16   CREATININE 0.48*     BNP: No results for input(s): BNP in the last 72 hours. PT/INR: No results for input(s): PROTIME, INR in the last 72 hours. APTT: No results for input(s): APTT in the last 72 hours. CARDIAC ENZYMES: No results for input(s): CKMB, CKMBINDEX, TROPONINT in the last 72 hours.     Invalid input(s): CKTOTAL;3  FASTING LIPID PANEL:  Lab Results   Component Value Date    CHOL 56 2019    HDL 13 (L) 2019    TRIG 107 2019     LIVER PROFILE:   Recent Labs     19  0615   AST 26   ALT 43*   BILITOT 0.61   ALKPHOS 132*        Current Medications:   Current Facility-Administered Medications: insulin glargine (LANTUS) injection vial 32 Units, 32 Units, Subcutaneous, Daily  oxyCODONE (ROXICODONE) immediate release tablet 5 mg, 5 mg, Oral, Q6H PRN **OR** [DISCONTINUED] oxyCODONE (ROXICODONE) immediate release tablet 10 mg, 10 mg, Oral, Q4H PRN  cephALEXin (KEFLEX) capsule 500 mg, 500 mg, Oral, 4 times per day  lisinopril (PRINIVIL;ZESTRIL) tablet 2.5 mg, 2.5 cardiology follow-up. 3. Pulmonary-patient with respiratory arrest on admission at [facility]. Farhad was extubated on 4/19/2019. Patient was weaned from oxygen. Continue to monitor. Doing well. Patient had encephalopathy slept better with trazodone Seroquel discontinued on 4/30/2019.  4. ID-history of chronic osteomyelitis of the leg with hardware removal on 4/22/2019. Residual right foot drop. Weightbearing as tolerated. On doxycycline 60 days for chronic suppression with leukocytosis which is improving. Keflex until 5/10/2019. Continue dressing changes/wound care. 5. MSK-patient has follow-up appointment with Dr. Platt January on May 7, 2019 at 9:20 AM.  6. Hyponatremia resolved-135 on 5/4/2019.  7. Anemia-stable at 11 on 5/4/2019. Continue to monitor. 8. Endocrine-history of diabetes on insulin sliding scale glucose as above. Continue monitoring. 9. Neurology- patient to follow-up with Dr. Andrea Ruiz on 5/21/2019 for decision on stenting patient on aspirin and Plavix and Lipitor. 10. Pain-on oxycodone consider taper. 11. Wounds-multiple wounds plaque and he'll PRAFO bilateral lower extremities to elevate heels off bed. Patient on Doxy and Keflex. 12. DVT prophylaxis Lovenox. 13. Internal medicine and infectious disease following. Electronically signed by Jose Moyer MD on 5/6/2019 at 11:23 AM      This note is created with the assistance of a speech recognition program.  While intending to generate a document that actually reflects the content of the visit, the document can still have some errors including those of syntax and sound a like substitutions which may escape proof reading.   In such instances, actual meaning can be extrapolated by contextual diversion.

## 2019-05-06 NOTE — CARE COORDINATION
Writer discussed discharge plan with the pt. Pt would like to go to a snf. with offered him choice. Pt would like to go [facility]. Writer made referral and faxed info.

## 2019-05-06 NOTE — PROGRESS NOTES
CORONARY ARTERY BYPASS GRAFT N/A 4/7/2019    EMERGENT CPR,  CORONARY ARTERY BYPASS X4, ON PUMP ,CHEST LEFT OPEN; SWAN, ANGELA PER ANESTHESIA performed by Deep Healy MD at [address] [facility]      unsure if correct known tibial surgery    HARDWARE REMOVAL Right 04/07/2019    R Knee internal screw protruding through skin    HARDWARE REMOVAL Right 04/22/2019    HARDWARE REMOVAL RIGHT TIBIA,    RECONSTRUCTIVE REPAIR STERNAL N/A 4/8/2019    STERNAL  WOUND WASHOUT AND CLOSURE S/P CABG performed by Deep Healy MD at [address] [place] [facility] N/A 4/22/2019    HARDWARE REMOVAL RIGHT TIBIA, IRRIGATION AND DEBRIDEMENT [address] performed by Khoi Colin MD at [address] Right     Jen Burly off roof, external fixation and internal hardware    TONSILLECTOMY            Admission Meds  No current facility-administered medications on file prior to encounter. No current outpatient medications on file prior to encounter. Allergies  No Known Allergies     Social   Social History     Tobacco Use    Smoking status: Former Smoker   Substance Use Topics    Alcohol use: Not on file                No family history on file. Review of Systems  No fever / chills / sweats. No oral lesion, sore throat, dysphagia. Denies cough / sputum. Denies chest pain, palpitations. Denies n / v / abd pain. No diarrhea. Denies dysuria or change in urinary function. Other than above 12 systems reviewed were negative . Tolerating antibiotics.          Physical Exam  BP (!) 142/73   Pulse 96   Temp 97.9 °F (36.6 °C) (Oral)   Resp 15   Ht 5' 8.11\" (1.73 m)   Wt 145 lb 1.6 oz (65.8 kg)   SpO2 98%   BMI 21.99 kg/m²           General Appearance: alert and oriented to person, place and time, well-developed and well-nourished, in no acute distress  Skin: warm and dry, no rash   Head: normocephalic and atraumatic  Eyes: pupils equal, round, and reactive HISTORY: Trauma/Fracture TECHNOLOGIST PROVIDED HISTORY: Trauma/Fracture FINDINGS: Sideplate and 4 screws transfix the proximal tibia laterally. Old healed fracture proximal fibular neck and head. Spurring lateral tibia plateau. Proximal tibiofibular syndesmosis. Visualized femur and patella normal. Soft tissues unremarkable. ORIF proximal tibia. No acute fracture. Xr Tibia Fibula Right (2 Views)    Result Date: 4/22/2019  Radiology exam is complete. No Radiologist dictation. Please follow up with ordering provider. Ct Head Wo Contrast    Result Date: 4/13/2019  EXAMINATION: CT OF THE HEAD WITHOUT CONTRAST  4/13/2019 4:29 pm TECHNIQUE: CT of the head was performed without the administration of intravenous contrast. Dose modulation, iterative reconstruction, and/or weight based adjustment of the mA/kV was utilized to reduce the radiation dose to as low as reasonably achievable. COMPARISON: 04/12/2019 HISTORY: ORDERING SYSTEM PROVIDED HISTORY: f/u ct, s/p cardiac arrest, ? attenuation in L parietal lobe TECHNOLOGIST PROVIDED HISTORY: Ordering Physician Provided Reason for Exam: f/u ct, s/p cardiac arrest, ? attenuation in L parietal lobe Acuity: Unknown Type of Exam: Unknown FINDINGS: BRAIN/VENTRICLES: There is no evidence of acute infarct or hemorrhage. Previously suspected left parietal hypodensity is better seen on the current study to represent partial volume averaging of a sulcus. Bilateral anterior frontal and high left frontoparietal encephalomalacia is re- demonstrated. There is no hydrocephalus or extra-axial fluid collection. Midline is maintained. Basal cisterns are patent. ORBITS: The visualized portion of the orbits demonstrate no acute abnormality. SINUSES: Diffuse paranasal sinus mucoperiosteal thickening. Trace bilateral mastoid effusions. These findings may relate to intubation. SOFT TISSUES/SKULL:  No acute abnormality of the visualized skull or soft tissues.      1. No acute intracranial abnormality. 2. Stable multifocal encephalomalacia. Ct Head Wo Contrast    Result Date: 4/12/2019  EXAMINATION: CT OF THE HEAD WITHOUT CONTRAST  4/12/2019 10:25 am TECHNIQUE: CT of the head was performed without the administration of intravenous contrast. Dose modulation, iterative reconstruction, and/or weight based adjustment of the mA/kV was utilized to reduce the radiation dose to as low as reasonably achievable. COMPARISON: None. HISTORY: ORDERING SYSTEM PROVIDED HISTORY: NEURO DEFICIT(S), SUBACUTE, PROGRESSIVE OR FLUCTUATING TECHNOLOGIST PROVIDED HISTORY: Ordering Physician Provided Reason for Exam: History of Present Illness: This is a 46 y.o. male with unknown medical history admitted for management of cardiac arrest and STEMI. Patient initially called EMS for worsening SOB, placed on CPAP, given breathing treatments, solumedrol, aspirin, transport initiated to [facility]. EKG done by EMS showed possible anterior septal STEMI. On arrival to [facility] ED, pulses were lost. ACLS protocol initiated, CPR initiated patient intubated. EKG showed PEA. Three rounds CPR with epi given, had return of pulses. Bedside ECHO at that time showed globally decreased systolic contraction. Cardiology consulted, agreed for cath. Cath done showed multivessel CAD with severe LV systolic dysfunction EF 86% with severe hypokinesis of ant and mid inferior wall and apical akinesis. Right iliac a also noted to be occluded. FINDINGS: BRAIN/VENTRICLES: There is no acute intracranial hemorrhage, mass effect or midline shift. No abnormal extra-axial fluid collection. The gray-white differentiation is maintained without evidence of an acute infarct. There is no evidence of hydrocephalus. Encephalomalacia is present in the left frontal lobe consistent with remote infarct. Punctate density is noted along the encephalomalacia which may represent an area of dystrophic calcification.   An additional area of encephalomalacia is noted in the high left parietal lobe. An area of encephalomalacia is also present in the right frontal lobe, and there is hypodensity in the left posterior parietal lobe which may represent subacute to chronic infarct. Scattered hypodensities are present in the white matter consistent with chronic microvascular change. Vertebral and internal carotid arteries are calcified. Small extradural calcification is noted near the vertex, right side. This may represent a small meningioma without mass effect of 8 mm. ORBITS: The visualized portion of the orbits demonstrate no acute abnormality. SINUSES: Moderate mucoperiosteal thickening is noted in the maxillary and sphenoid sinuses. Frontal sinuses are hypoplastic. Significant mucoperiosteal thickening is noted in the ethmoid air cells. Mastoid air cells are appropriately aerated. SOFT TISSUES/SKULL:  No acute abnormality of the visualized skull or soft tissues. Estimated biologic radiation dose for this procedure:1059.07 mGy/cm2. Scattered areas of encephalomalacia, bifrontal and high left parietal areas consistent with remote infarct. There is hypodensity in the left lower parietal region possibly a subacute infarct. Chronic microvascular changes are noted. A punctate density is noted adjacent to the encephalomalacia in the left frontal lobe, favoring dystrophic calcification over petechial hemorrhage. RECOMMENDATIONS: MRI imaging of the brain is suggested given differing ages of the ischemic changes in the patient's history. Nm Hepatobiliary    Result Date: 4/15/2019  EXAMINATION: NUCLEAR MEDICINE HEPATOBILIARY SCINTIGRAPHY (HIDA SCAN). 4/15/2019 8:28 am TECHNIQUE: Approximately 3 [id number] Mebrofenin (Choletec) was administered IV. Then, dynamic images of the abdomen were obtained in the anterior projection for 60 mins. COMPARISON: Renal ultrasound April 12, 2019.  HISTORY: ORDERING SYSTEM PROVIDED HISTORY: CHOLECYSTITIS TECHNOLOGIST PROVIDED HISTORY: Ordering Physician Provided Reason for Exam: Thickened GB wall·Hepatitis  - possible amiodarone, GB w sludge FINDINGS: Prompt, homogenous uptake by the liver is noted with normal appearance of radiotracer excretion into the biliary system. Clearance of bloodpool activity appears appropriate. Gallbladder and small bowel is visualized in appropriate sequence. No scintigraphic evidence of biliary obstruction. Us Renal Complete    Result Date: 4/13/2019  EXAMINATION: RETROPERITONEAL ULTRASOUND OF THE KIDNEYS 4/12/2019 COMPARISON: None HISTORY: ORDERING SYSTEM PROVIDED HISTORY: RENAL FAILURE, ACUTE (KIDNEY INJURY) TECHNOLOGIST PROVIDED HISTORY: increased creatinine FINDINGS: Kidneys: The right kidney measures 12.5 cm in length and the left kidney measures 12.6 cm in length. Kidneys demonstrate normal cortical echogenicity. No evidence of hydronephrosis or intrarenal stones. There is 1.1 x 1.0 x 0.8 cm left renal cyst. Other: Partially imaged gallbladder sludge. Apparent gallbladder wall thickening. 1. No evidence of hydronephrosis. Normal renal cortical echogenicity. 2. Partially imaged gallbladder sludge. Apparent gallbladder wall thickening. Consider further evaluation with dedicated right upper quadrant ultrasound. Xr Chest Portable    Result Date: 4/19/2019  EXAMINATION: SINGLE XRAY VIEW OF THE CHEST 4/19/2019 6:38 am COMPARISON: April 17, 2019 HISTORY: ORDERING SYSTEM PROVIDED HISTORY: intubated TECHNOLOGIST PROVIDED HISTORY: intubated FINDINGS: Endotracheal tube with the tip just below the level of the clavicles. Enteric tube with the tip within the stomach. Partial clearing at the right lung base. Persistent left lung base opacities. Sternotomy. Cardiomegaly. Skin staples. No definite pulmonary edema. Partial clearing at the right lung base. Persistent left lung base opacity.      Xr Chest Portable    Result Date: 4/17/2019  EXAMINATION: SINGLE XRAY VIEW OF THE CHEST 4/17/2019 10:13 multifocal pneumonia. Follow-up is recommended to document resolution. 2. Endotracheal and enteric tubes as detailed above. 3. Stable mild cardiomegaly. Xr Chest Portable    Result Date: 4/15/2019  EXAMINATION: SINGLE XRAY VIEW OF THE CHEST 4/14/2019 5:53 am COMPARISON: Chest radiograph 04/10/2019 HISTORY: ORDERING SYSTEM PROVIDED HISTORY: pt intubated TECHNOLOGIST PROVIDED HISTORY: pt intubated FINDINGS: Endotracheal tube and nasogastric tube are in unchanged position. Stable cardiomegaly. Pulmonary edema has improved since the prior examination. Small left pleural effusion and left basilar atelectasis have improved since the prior examination. No pneumothorax. Stable support lines and tubes. Pulmonary edema, small left pleural effusion, and left basilar atelectasis have improved since the prior examination. Xr Chest Portable    Result Date: 4/10/2019  EXAMINATION: SINGLE XRAY VIEW OF THE CHEST 4/10/2019 6:47 am COMPARISON: Chest radiograph performed 04/09/2019. HISTORY: ORDERING SYSTEM PROVIDED HISTORY: post op TECHNOLOGIST PROVIDED HISTORY: post op FINDINGS: There is redemonstration of bilateral pulmonary congestion/edema that is similar compared to prior. There are bibasilar effusions. There is no pneumothorax. The heart is prominent. The upper abdomen is unremarkable. The extrathoracic soft tissues are unremarkable. There is an endotracheal tube with the tip in the midtrachea. There is a gastric tube with the tip in the proximal aspect of the stomach. The right internal jugular line has been removed. The [place] catheter has been removed. Cardiomegaly and bilateral pulmonary congestion/edema that is similar compared to prior. Bibasilar effusions and stable endotracheal tube and gastric tube.      Xr Chest Portable    Result Date: 4/9/2019  EXAMINATION: SINGLE XRAY VIEW OF THE CHEST 4/9/2019 5:30 am COMPARISON: 04/08/2019 HISTORY: ORDERING SYSTEM PROVIDED HISTORY: post op review. MIP images are provided for review. Stenosis of the internal carotid arteries measured using NASCET criteria. Dose modulation, iterative reconstruction, and/or weight based adjustment of the mA/kV was utilized to reduce the radiation dose to as low as reasonably achievable. 3D reconstructed images were performed on a separate workstation and provided for review. COMPARISON: CT head 04/12/2019 HISTORY: ORDERING SYSTEM PROVIDED HISTORY: STROKE TECHNOLOGIST PROVIDED HISTORY: Ordering Physician Provided Reason for Exam: STROKE S/P CABG Additional signs and symptoms: PT INTUBATED FINDINGS: CTA NECK: AORTIC ARCH/ARCH VESSELS: Moderate plaque identified involving the aortic arch is visualized. 3 vessel configuration of the aortic arch. Moderate severe stenosis identified involving the left common carotid artery origin and proximal common carotid artery and involving the left iliac artery. Motion artifact through the ostia great vessels challenge evaluation. There is 60-70% stenosis identified involving the proximal left common carotid artery and left subclavian artery. Mild-to-moderate focal stenosis identified involving the ostium of the right brachiocephalic artery measuring 30%. CAROTID ARTERIES: Left common carotid artery demonstrates moderate diffuse atherosclerotic disease with focal stenosis involving the left ostium measuring 60-70%. Right common carotid artery noted is mild diffuse atherosclerotic disease without hemodynamic stenosis or occlusion. . Right internal carotid artery demonstrates moderate diffuse atherosclerotic disease without up 60% stenosis per NASCET criteria. Left internal carotid artery is occluded at its origin sent to level skull base. No dissection or arterial injury is seen on the right. . 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   Right vertebral artery demonstrates mild stenosis at this ostium but otherwise grossly patent. Winifred Never SOFT TISSUES: Large bilateral pleural fluid collections. Bibasilar airspace disease and atelectasis. Chronic emphysematous changes. .  No cervical or superior mediastinal lymphadenopathy. The visualized portion of the larynx and pharynx appear unremarkable. The parotid, submandibular and thyroid glands demonstrate no acute abnormality. BONES: The visualized osseous structures demonstrate multilevel degenerative changes. . CTA HEAD: ANTERIOR CIRCULATION: Right petrous segment, cavernous segments cervical segments are patent. Left petrous, cavernous segments are occluded. Reconstitution left carotid terminus likely due to combination of extracranial and intracranial collateral flow. The anterior cerebral and middle cerebral arteries demonstrate no focal stenosis. POSTERIOR CIRCULATION: Bilateral P comms. The posterior cerebral arteries demonstrate no focal stenosis. The vertebral and basilar arteries appear unremarkable. BRAIN: No mass effect or midline shift. No hyperdense extra-axial fluid collection. Chronic encephalomalacia left TOAN distribution involving both inferior frontal lobes. The gray-white differentiation appears grossly maintained. Air-fluid levels both sphenoid sinuses and posterior ethmoid air cells. Occlusion left ICA vertebral artery  extending to level skull base. Moderate atherosclerotic changes identified involving the remainder of the cervical arterial vasculature. Reconstitution left carotid terminus. Otherwise no hemodynamic stenosis or occlusion identified involving intracranial circulation . Cta Head W Contrast    Result Date: 4/18/2019  EXAMINATION: CTA OF THE HEAD WITH CONTRAST; CTA OF THE NECK 4/18/2019 5:31 pm: TECHNIQUE: CTA of the head/brain was performed with the administration of intravenous contrast. Multiplanar reformatted images are provided for review. MIP images are provided for review. Dose modulation, iterative reconstruction, and/or weight based adjustment of the mA/kV was utilized to reduce the radiation dose to as low as reasonably achievable.; CTA of the neck was performed with the administration of intravenous contrast. Multiplanar reformatted images are provided for review. MIP images are provided for review. Stenosis of the internal carotid arteries measured using NASCET criteria. Dose modulation, iterative reconstruction, and/or weight based adjustment of the mA/kV was utilized to reduce the radiation dose to as low as reasonably achievable. 3D reconstructed images were performed on a separate workstation and provided for review. COMPARISON: CT head 04/12/2019 HISTORY: ORDERING SYSTEM PROVIDED HISTORY: STROKE TECHNOLOGIST PROVIDED HISTORY: Ordering Physician Provided Reason for Exam: STROKE S/P CABG Additional signs and symptoms: PT INTUBATED FINDINGS: CTA NECK: AORTIC ARCH/ARCH VESSELS: Moderate plaque identified involving the aortic arch is visualized. 3 vessel configuration of the aortic arch. Moderate severe stenosis identified involving the left common carotid artery origin and proximal common carotid artery and involving the left iliac artery. Motion artifact through the ostia great vessels challenge evaluation. There is 60-70% stenosis identified involving the proximal left common carotid artery and left subclavian artery. Mild-to-moderate focal stenosis identified involving the ostium of the right brachiocephalic artery measuring 30%. CAROTID ARTERIES: Left common carotid artery demonstrates moderate diffuse atherosclerotic disease with focal stenosis involving the left ostium measuring 60-70%. Right common carotid artery noted is mild diffuse atherosclerotic disease without hemodynamic stenosis or occlusion. . Right internal carotid artery demonstrates moderate diffuse atherosclerotic disease without up 60% stenosis per NASCET criteria.   Left internal carotid artery is 4/22/2019. History of ORIF of the right proximal tibia, noncompliant with follow-up with reported protruding hardware through the skin. Acute renal failure resolved     Coronary artery disease /recent cardiac arrest status post emergent cath and CABG on 4/7/2019 with delayed the chest wall incision closure. Acute CVA (cerebrovascular accident)     Moderate malnutrition ([address]      Recommendations:   Continue PO Doxycycline  for chronic suppression     PO Keflex  until 5/10/19 . Follow CBC and renal function. Thank you for allowing me to participate in the care of your patient. Please feel free to contact me with any questions or concerns.      Cadence Lux MD

## 2019-05-06 NOTE — PLAN OF CARE
Problem: Risk for Impaired Skin Integrity  Goal: Tissue integrity - skin and mucous membranes  Description  Structural intactness and normal physiological function of skin and  mucous membranes. Outcome: Ongoing  Zero obvious new skin issues so far this shift. Safety maintained this shift. Will continue to monitor. Problem: Falls - Risk of:  Goal: Will remain free from falls  Description  Will remain free from falls  Outcome: Ongoing  Patient remains free from falls/injuries at this time. Call light within reach. Telesitter in place due to impulsiveness. Safety maintained this shift. Will continue to monitor. Problem: Pain:  Goal: Pain level will decrease  Description  Pain level will decrease  Outcome: Ongoing  Pain controlled with prn medications this shift. Safety maintained this shift. Will continue to monitor.

## 2019-05-06 NOTE — PROGRESS NOTES
[address]   ACUTE REHABILITATION OCCUPATIONAL THERAPY  DAILY NOTE    Date: 2019  Patient Name: Stephan Hendricks        MRN: [id number]    : 1965  (46 y.o.)  Gender: male   Referring Practitioner: Kermit Barragan MD  Diagnosis: Acute CVA, cardiac arrest, CABG    Restrictions  Restrictions/Precautions: Cardiac, General Precautions, Surgical Protocols, Fall Risk  Sternal Precautions: Emergent CABG X4  Other position/activity restrictions: Fall Risk  Required Braces or Orthoses  Other: Heart Hugger Brace  Right Lower Extremity Brace: Ankle Foot Orthotics  RLE Brace Type: Profo boot and AFO  Left Lower Extremity Brace: Boot  LLE Brace Type: Profo boot      Subjective  Subjective: \"My brother came to visit me yesterday and that made me feel good\". Pt also reports he does not feel ready to go home d/t his walking and stair ability-SW referring him to SNF. Comments: Pt pleasant and cooperative. Telesitter removed from room today. Patient Currently in Pain: No  Pain Level: 0  Overall Orientation Status: Within Functional Limits  Orientation Level: Oriented X4    Objective  Cognition  Overall Cognitive Status: WFL  Perception  Overall Perceptual Status: WFL  Balance  Sitting Balance: Supervision  Standing Balance: Stand by assistance  Transfers  Sit to stand: Stand by assistance  Stand to sit: Stand by assistance  Transfer Comments: pt c improved awareness of hand placement, able to identify and correct ind. Standing Balance  Time: Am: 1-2 min x2, 4-5 min, <1 min   Activity: AM: mobility in room, ADL tasks. Comment: no LOB this date, occ cueing for safety   Functional Mobility  Activity: To/from bathroom  Assist Level: Stand by assistance  Functional Mobility Comments: occ cueing for safety, no LOB  Shower Transfers  Shower - Transfer From: Alina Gastelum - Transfer Type: To and From  Shower - Transfer To:  Transfer tub bench  Shower - Technique: Ambulating  Shower Transfers: Stand by assistance     Type of ROM/Therapeutic Exercise  Type of ROM/Therapeutic Exercise: Cane/Wand(2#)  Comment: Pt completes BUE ex's to address overall strength and endurance for functional tasks. Pt completed ex's in seated position, slow and controlled, shortened rest breaks req. All planes of mvmt within cardiac restrictions. Improved tolerance noted this date. (2x20 reps. Trial increased reps or same reps c increased arsh)  Exercises  Scapular Protraction: x  Scapular Retraction: x  Shoulder Depression: x(through arm circles)  Shoulder Elevation: x  Shoulder Flexion: x  Shoulder Extension: x  Elbow Flexion: x  Elbow Extension: x  Wrist Flexion: x  Wrist Extension: x  Grasp/Release: resistive theraputty (min) to address B hand and isolated digit strength as well as address endurance c B integrated mvmt at tabletop. Functional Mobility  Activity: To/from bathroom  Assist Level: Stand by assistance  Functional Mobility Comments: occ cueing for safety, no LOB        Additional Activities: Other  Additional Activities: [place] pt unscrewed/screwed x18 nuts/bolts (placing x7 into hole in box). Pt used B hands to address [facility] for functional tasks and assist to improving score on 9 hole peg assessment. ADL  Additional Comments: Pt utilizes shower bench and LHS for bathing this date.         OT FIM:   Eatin - Feeds self with adaptive equipment/dentures and/or feeds self with modified diet and/or performs own tube feeding  Groomin - Requires setup/cues to do all tasks(SBA sinkside. )  Bathin - Able to bathe all 10 areas with setup/sup/cues(set up, SBA/supervision )  Dressing-Upper: 5 - Requires setup/supervision/cues and/or requires assist with presthesis/brace only(set up )  Dressing-Lower: 5 - Requires setup/supervision/cues and/or staff applies TEDS/prosthesis/brace only(set up, SBA to pull up )  Toiletin - Did not occur  Toilet Transfer: 0 - Did not occur  Primary Mode: Shower  Tub Transfer: 0 - Activity does not occur  Shower Transfer: 5 - Supervision, set-up, cues(SBA)    Assessment  Performance deficits / Impairments: Decreased functional mobility ; Decreased cognition;Decreased high-level IADLs;Decreased endurance;Decreased ADL status; Decreased balance;Decreased safe awareness;Decreased strength;Decreased coordination  Prognosis: Good  Discharge Recommendations: Outpatient OT  Activity Tolerance: Patient Tolerated treatment well  Safety Devices in place: Yes  Type of devices: Call light within reach; Left in chair;Gait belt(telesitter)        Plan  Continue current plan of care        05/06/19 0946 05/06/19 1415   OT Individual Minutes   Time In 0835 1331   Time Out 0920 1400   Minutes 45 29     Electronically signed by Reyes Kick, COTA/L on 5/6/19 at 2:24 PM

## 2019-05-06 NOTE — PROGRESS NOTES
Nutrition Assessment    Type and Reason for Visit: Reassess    Nutrition Recommendations: Continue diet and supplements as ordered. Nutrition Assessment: Nutritional intake is improving. Pt remains at risk for further nutrition compromise due to increased needs for healing, diabetes, and cognitive issues. Oral nutrition supplements changed to lower carbohydrate type. Malnutrition Assessment:  · Malnutrition Status: Meets the criteria for moderate malnutrition  · Context: Acute illness or injury  · Findings of the 6 clinical characteristics of malnutrition (Minimum of 2 out of 6 clinical characteristics is required to make the diagnosis of moderate or severe Protein Calorie Malnutrition based on AND/ASPEN Guidelines):  1. Energy Intake-Less than or equal to 50% of estimated energy requirement(Improving), Greater than or equal to 7 days    2. Weight Loss-20% loss or greater, (in 3 weeks)  3. Fat Loss-Moderate subcutaneous fat loss, Orbital, Fat overlying the ribs  4. Muscle Loss-Moderate muscle mass loss, Temples (temporalis muscle), Clavicles (pectoralis and deltoids), Interosseous  5. Fluid Accumulation-(Moderate fluid accumulation), Extremities, Generalized  6.  Strength-Not measured    Nutrition Risk Level: Moderate    Nutrient Needs:  · Estimated Daily Total Kcal: 1365-1872 kcal based on 32-34 kcal/kg of admission weight  · Estimated Daily Protein (g):  gm based on 1.4-1.6 gm/kg of ideal body weight    Nutrition Diagnosis:   · Problem: Inadequate oral intake  · Etiology: related to Cardiac dysfunction     Signs and symptoms:  as evidenced by Weight loss, Moderate loss of subcutaneous fat, Moderate muscle loss, Diet history of poor intake    Objective Information:  · Nutrition-Focused Physical Findings: Edema: Trace generalized, RUE, LUE; +2 RLE, LLE.    · Wound Type: Multiple, Pressure Ulcer, Stage II, Unstageable, Surgical Wound, Deep Tissue Injury  · Current Nutrition Therapies:  · Oral Diet Orders: Carb Control 4 Carbs/Meal   · Oral Diet intake: %  · Oral Nutrition Supplement (ONS) Orders: Standard High Calorie Oral Supplement  · ONS intake: %  · Anthropometric Measures:  · Ht: 5' 8.11\" (173 cm)   · Current Body Wt: 138 lb 12.8 oz (63 kg)  · Admission Body Wt: 140 lb (63.5 kg)  · Usual Body Wt: 175 lb (79.4 kg)  · % Weight Change:  ,  21% loss in 3 weeks  · Ideal Body Wt: 154 lb (69.9 kg), % Ideal Body 91% of admission weight  · BMI Classification: BMI 18.5 - 24.9 Normal Weight    Nutrition Interventions:   Continue current diet, Modify current ONS  Continued Inpatient Monitoring, Education not appropriate at this time    Nutrition Evaluation:   · Evaluation: Progressing toward goals   · Goals: PO intake % of meals and supplements    · Monitoring: Meal Intake, Supplement Intake, Diet Tolerance, Wound Healing, I&O, Mental Status/Confusion, Weight, Pertinent AVERY Allen R.D..   Clinical Dietitian  Office: [phone]

## 2019-05-07 LAB
GLUCOSE BLD-MCNC: 109 MG/DL (ref 75–110)
GLUCOSE BLD-MCNC: 111 MG/DL (ref 75–110)
GLUCOSE BLD-MCNC: 194 MG/DL (ref 75–110)
GLUCOSE BLD-MCNC: 197 MG/DL (ref 75–110)

## 2019-05-07 PROCEDURE — 97112 NEUROMUSCULAR REEDUCATION: CPT

## 2019-05-07 PROCEDURE — 6370000000 HC RX 637 (ALT 250 FOR IP): Performed by: PHYSICAL MEDICINE & REHABILITATION

## 2019-05-07 PROCEDURE — 6370000000 HC RX 637 (ALT 250 FOR IP): Performed by: INTERNAL MEDICINE

## 2019-05-07 PROCEDURE — 99232 SBSQ HOSP IP/OBS MODERATE 35: CPT | Performed by: PHYSICAL MEDICINE & REHABILITATION

## 2019-05-07 PROCEDURE — 97110 THERAPEUTIC EXERCISES: CPT

## 2019-05-07 PROCEDURE — 97530 THERAPEUTIC ACTIVITIES: CPT

## 2019-05-07 PROCEDURE — 97535 SELF CARE MNGMENT TRAINING: CPT

## 2019-05-07 PROCEDURE — 99232 SBSQ HOSP IP/OBS MODERATE 35: CPT | Performed by: INTERNAL MEDICINE

## 2019-05-07 PROCEDURE — 6360000002 HC RX W HCPCS: Performed by: NURSE PRACTITIONER

## 2019-05-07 PROCEDURE — 97116 GAIT TRAINING THERAPY: CPT

## 2019-05-07 PROCEDURE — 1180000000 HC REHAB R&B

## 2019-05-07 PROCEDURE — 97127 HC SP THER IVNTJ W/FOCUS COG FUNCJ: CPT

## 2019-05-07 PROCEDURE — 6370000000 HC RX 637 (ALT 250 FOR IP): Performed by: STUDENT IN AN ORGANIZED HEALTH CARE EDUCATION/TRAINING PROGRAM

## 2019-05-07 PROCEDURE — 6370000000 HC RX 637 (ALT 250 FOR IP): Performed by: NURSE PRACTITIONER

## 2019-05-07 PROCEDURE — 82947 ASSAY GLUCOSE BLOOD QUANT: CPT

## 2019-05-07 RX ORDER — OXYCODONE HYDROCHLORIDE 5 MG/1
5 TABLET ORAL 2 TIMES DAILY PRN
Status: DISCONTINUED | OUTPATIENT
Start: 2019-05-07 | End: 2019-05-09 | Stop reason: HOSPADM

## 2019-05-07 RX ADMIN — CLOPIDOGREL BISULFATE 75 MG: 75 TABLET ORAL at 07:25

## 2019-05-07 RX ADMIN — POLYETHYLENE GLYCOL 3350 17 G: 17 POWDER, FOR SOLUTION ORAL at 07:27

## 2019-05-07 RX ADMIN — METOPROLOL TARTRATE 25 MG: 25 TABLET ORAL at 20:36

## 2019-05-07 RX ADMIN — ATORVASTATIN CALCIUM 40 MG: 40 TABLET, FILM COATED ORAL at 20:36

## 2019-05-07 RX ADMIN — INSULIN GLARGINE 24 UNITS: 100 INJECTION, SOLUTION SUBCUTANEOUS at 07:37

## 2019-05-07 RX ADMIN — INSULIN LISPRO 2 UNITS: 100 INJECTION, SOLUTION INTRAVENOUS; SUBCUTANEOUS at 12:13

## 2019-05-07 RX ADMIN — MULTIPLE VITAMINS W/ MINERALS TAB 1 TABLET: TAB at 07:26

## 2019-05-07 RX ADMIN — DOCUSATE SODIUM 100 MG: 100 CAPSULE, LIQUID FILLED ORAL at 20:35

## 2019-05-07 RX ADMIN — CEPHALEXIN 500 MG: 500 CAPSULE ORAL at 00:18

## 2019-05-07 RX ADMIN — FAMOTIDINE 20 MG: 20 TABLET ORAL at 20:36

## 2019-05-07 RX ADMIN — CEPHALEXIN 500 MG: 500 CAPSULE ORAL at 12:13

## 2019-05-07 RX ADMIN — FUROSEMIDE 20 MG: 20 TABLET ORAL at 07:26

## 2019-05-07 RX ADMIN — FAMOTIDINE 20 MG: 20 TABLET ORAL at 07:26

## 2019-05-07 RX ADMIN — METOPROLOL TARTRATE 25 MG: 25 TABLET ORAL at 07:36

## 2019-05-07 RX ADMIN — DOXYCYCLINE 100 MG: 100 CAPSULE ORAL at 07:25

## 2019-05-07 RX ADMIN — ENOXAPARIN SODIUM 40 MG: 100 INJECTION SUBCUTANEOUS at 07:25

## 2019-05-07 RX ADMIN — CEPHALEXIN 500 MG: 500 CAPSULE ORAL at 17:31

## 2019-05-07 RX ADMIN — ASPIRIN 81 MG 81 MG: 81 TABLET ORAL at 07:25

## 2019-05-07 RX ADMIN — DOCUSATE SODIUM 100 MG: 100 CAPSULE, LIQUID FILLED ORAL at 07:34

## 2019-05-07 RX ADMIN — LISINOPRIL 2.5 MG: 5 TABLET ORAL at 07:36

## 2019-05-07 RX ADMIN — TRAZODONE HYDROCHLORIDE 50 MG: 50 TABLET ORAL at 20:36

## 2019-05-07 RX ADMIN — INSULIN LISPRO 1 UNITS: 100 INJECTION, SOLUTION INTRAVENOUS; SUBCUTANEOUS at 20:36

## 2019-05-07 RX ADMIN — CEPHALEXIN 500 MG: 500 CAPSULE ORAL at 06:13

## 2019-05-07 RX ADMIN — DOXYCYCLINE 100 MG: 100 CAPSULE ORAL at 20:36

## 2019-05-07 RX ADMIN — OXYCODONE HYDROCHLORIDE 5 MG: 5 TABLET ORAL at 17:33

## 2019-05-07 ASSESSMENT — PAIN SCALES - GENERAL
PAINLEVEL_OUTOF10: 5
PAINLEVEL_OUTOF10: 0
PAINLEVEL_OUTOF10: 4

## 2019-05-07 NOTE — PROGRESS NOTES
Lurdes served Dr. Sobeida Martinez to see if patient is okay to discharge to SNF with current medications or if change is needed. Waiting on response.

## 2019-05-07 NOTE — PROGRESS NOTES
[facility] Internal Medicine    Progress Note    5/6/2019    8:28 PM    Name:   Rosangela Beck  MRN:     [id number]     Acct:      [de-identified]   Room:   [address] [place] [address] Day:  6  Admit Date:  4/25/2019  7:49 PM    PCP:   No primary care provider on file. Code Status:  Full Code    Subjective:     C/C: No chief complaint on file. Interval History Status: improved. HPI:   Patient had a cardiac arrest, found to have multivessel coronary artery disease, had CABG, patient had Stroke with Speech Difficulties   Infected Hard ware was Removed From Right leg     5/6   stable    Leg pain djd   to seee ortho    Review of Systems:     Constitutional:  negative for chills, fevers, sweats  Respiratory:  negative for cough, dyspnea on exertion, hemoptysis, shortness of breath, wheezing  Cardiovascular:  negative for chest pain, chest pressure/discomfort, lower extremity edema, palpitations  Gastrointestinal:  negative for abdominal pain, constipation, diarrhea, nausea, vomiting  Neurological:  Speech difficulties, foot drop present in right leg    Medications:      Allergies:  No Known Allergies    Current Meds:   Scheduled Meds:    [START ON 5/7/2019] insulin glargine  24 Units Subcutaneous Daily    cephALEXin  500 mg Oral 4 times per day    lisinopril  2.5 mg Oral Daily    furosemide  20 mg Oral Daily    metoprolol tartrate  25 mg Oral BID    traZODone  50 mg Oral Nightly    enoxaparin  40 mg Subcutaneous Daily    atorvastatin  40 mg Oral Nightly    clopidogrel  75 mg Oral Daily    famotidine  20 mg Oral BID    therapeutic multivitamin-minerals  1 tablet Oral Daily with breakfast    aspirin  81 mg Oral Daily    docusate  100 mg Oral Daily    doxycycline monohydrate  100 mg Oral 2 times per day    insulin lispro  0-12 Units Subcutaneous TID     insulin lispro  0-6 Units Subcutaneous Nightly    docusate sodium  100 mg Oral BID    polyethylene glycol  17 g Oral Daily Continuous Infusions:    dextrose       PRN Meds: oxyCODONE **OR** [DISCONTINUED] oxyCODONE, dextrose, dextrose, glucagon (rDNA), glucose, magnesium hydroxide, albuterol, bisacodyl    Data:     Past Medical History:   has a past medical history of CAD (coronary artery disease), Cardiac arrest ([facility] [address] Osteolysis, [facility]Pulseless [facility] activity) [facility] and PVD (peripheral vascular disease) ([facility] Social History:   reports that he has quit smoking. He does not have any smokeless tobacco history on file. Family History: No family history on file. Vitals:  BP (!) 106/59   Pulse 93   Temp 98.1 °F (36.7 °C) (Oral)   Resp 15   Ht 5' 8.11\" (1.73 m)   Wt 145 lb 1.6 oz (65.8 kg)   SpO2 97%   BMI 21.99 kg/m²   Temp (24hrs), Av °F (36.7 °C), Min:97.9 °F (36.6 °C), Max:98.1 °F (36.7 °C)    Recent Labs     19  1141 19  1609 19  1651 19  1954   POCGLU 156* 63* 119* 230*       I/O (24Hr): Intake/Output Summary (Last 24 hours) at 2019  Last data filed at 2019 0429  Gross per 24 hour   Intake --   Output 1180 ml   Net -1180 ml       Labs:    [unfilled]    Lab Results   Component Value Date/Time    SPECIAL NOT REPORTED 2019 04:08 PM     Lab Results   Component Value Date/Time    CULTURE (A) 2019 04:08 PM     STAPHYLOCOCCUS SPECIES, COAGULASE NEGATIVE SCANT GROWTH Susceptibilities have not been performed. Notify the laboratory within 7 days for further workup if clinically indicated.     CULTURE NO ANAEROBIC ORGANISMS ISOLATED AT 5 DAYS (A) 2019 04:08 PM       [unfilled]    Radiology:      Physical Examination:        General appearance:  alert, cooperative and no distress  Mental Status:  oriented to person, place and time and normal affect  Lungs:  clear to auscultation bilaterally, normal effort  Heart:  regular rate and rhythm, no murmur  Abdomen:  soft, nontender, nondistended, normal bowel sounds, no masses, hepatomegaly, bp controlled   off bp meds    Maximus Gibson MD  5/6/2019  8:28 PM

## 2019-05-07 NOTE — PLAN OF CARE
Problem: Risk for Impaired Skin Integrity  Goal: Tissue integrity - skin and mucous membranes  Description  Structural intactness and normal physiological function of skin and  mucous membranes.   Outcome: Ongoing     Problem: Falls - Risk of:  Goal: Will remain free from falls  Description  Will remain free from falls  5/7/2019 1619 by Dmitriy Patel RN  Outcome: Ongoing  5/7/2019 0549 by Piper Judge RN  Outcome: Ongoing  Goal: Absence of physical injury  Description  Absence of physical injury  Outcome: Ongoing     Problem: Pain:  Goal: Pain level will decrease  Description  Pain level will decrease  5/7/2019 1619 by Dmitriy Patel RN  Outcome: Ongoing  5/7/2019 0549 by Piper Judge RN  Outcome: Ongoing  Goal: Control of acute pain  Description  Control of acute pain  5/7/2019 1619 by Dmitriy Patel RN  Outcome: Ongoing  5/7/2019 0549 by Piper Judge RN  Outcome: Ongoing  Goal: Control of chronic pain  Description  Control of chronic pain  Outcome: Ongoing     Problem: Nutrition  Goal: Optimal nutrition therapy  Outcome: Ongoing

## 2019-05-07 NOTE — PROGRESS NOTES
Speech Language Pathology  Speech Language Pathology  [facility]    Cognitive Treatment Note    Date: 5/7/2019  Patients Name: Stephan Hendricks  MRN: [id number]  Diagnosis:   Patient Active Problem List   Diagnosis Code    PAD (peripheral artery disease) ([facility]) I73.9    Subacute osteomyelitis of right tibia ([facility] [facility] Q31.741    Metabolic encephalopathy I30.52    Abnormal CT of the head R93.0    Hepatitis K75.9    Acute cerebral infarction associated with systemic hypoxia or ischemia I63.89    Multi infarct state I69.30    Carotid stenosis, bilateral I65.23    Right foot drop M21.371    Thrombocytosis ([facility]) D47.3    Acute CVA (cerebrovascular accident) ([facility] [address] I63.9    Moderate malnutrition ([facility] [address] E44.0    Cellulitis of right lower extremity L03.115       Pain: 0/10    Cognitive Treatment    Treatment time: 8236-6870    Subjective: [x] Alert [x] Cooperative     [] Confused     [] Agitated    [] Lethargic    Objective/Assessment:  Attention: functional    Recall:  3 word reverse order- 100%, self correcting all mistakes. Organization:  6 step written sequencing- 100%. .     Problem Solving/Reasoning: planning projects puzzle- 100%. Follow written directions- 85%    Other:     Plan:  [x] Continue ST services    [] Discharge from :      Discharge recommendations: [] Inpatient Rehab   [] [place]   [] Outpatient Therapy  [] Follow up at trauma clinic   [] Other:       Treatment completed by: Massimo Ellison A.CCC/SLP

## 2019-05-07 NOTE — PROGRESS NOTES
19 1507   OT Individual Minutes   Time In 1300   Time Out 400 [facility]   ACUTE REHABILITATION OCCUPATIONAL THERAPY  DAILY NOTE    Date: 19  Patient Name: Rosangela Beck      Room: 8405/6338-76    MRN: [id number]   : 1965  (46 y.o.)  Gender: male   Referring Practitioner: Sondra Dominguez MD  Diagnosis: Acute CVA, cardiac arrest, CABG  Additional Pertinent Hx: Pt admitted to [facility] ED 19 with cardiac arrest and increased SOB. Pt underwent emergent CABG x4 19 with delayed closure on 19. Pt was intubated from 19 to 19. Pt also underwent screw removal from ORIF of R knee on 19 and I&D on the R knee 19. Pt admitted to [facility] for rehabiliation on 19    Restrictions  Restrictions/Precautions: Cardiac, General Precautions, Surgical Protocols, Fall Risk(telesitter)  Sternal Precautions: Emergent CABG X4  Other position/activity restrictions: Fall Risk  Required Braces or Orthoses  Other: Heart Hugger Brace  Right Lower Extremity Brace: Ankle Foot Orthotics  RLE Brace Type: Profo boot and AFO  Left Lower Extremity Brace: Boot  LLE Brace Type: Profo boot  Required Braces or Orthoses?: Yes(R AFO, R procare boot)    Subjective \"I don't know why I'm wearing this, I'm used to it. Used to be I wore it in case I hit bumps\"  Patient Currently in Pain: No     Overall Orientation Status: Within Functional Limits          Objective Pt left heart hugger in room. Per patient going to [employer] fasc[facility] tomorrow because he was uncomfortable going home with 22 steps. Continue POC    Cognition  Overall Cognitive Status: Impaired  Memory: Decreased short term memory     Transfers  Stand Step Transfers: Modified independent(using r/w )  Sit to stand: Modified independent  Stand to sit: Modified independent           Type of ROM/Therapeutic Exercise  Type of ROM/Therapeutic Exercise: AROM; Free weights  Exercises  Shoulder Elevation: x  Shoulder Flexion: x  Elbow Flexion: x  Elbow Extension: x  Finger Flexion: x  Finger Extension: x  Grasp/Release: x  Other: 3# 3 sets of 8 in 4 planes                                   OT FIM:    see AM note                Assessment  Performance deficits / Impairments: Decreased functional mobility ; Decreased cognition;Decreased high-level IADLs;Decreased endurance;Decreased ADL status; Decreased balance;Decreased safe awareness;Decreased strength;Decreased coordination  Prognosis: Good  Activity Tolerance: Patient Tolerated treatment well  Safety Devices in place: Yes  Type of devices: Call light within reach; Left in chair;Gait belt          Patient Education:  Patient Goals   Patient goals : to get better     Learner:patient  Method: explanation       Outcome: demonstrated understanding     Plan  Plan  Times per week: 5-7x/wk   Times per day: Twice a day  Current Treatment Recommendations: Balance Training, Functional Mobility Training, Endurance Training, Equipment Evaluation, Education, & procurement, Home Management Training, Patient/Caregiver Education & Training, Self-Care / ADL, Safety Education & Training, Pain Management, Strengthening  Patient Goals   Patient goals : to get better  Short term goals  Time Frame for Short term goals: in 7 days, pt will  Short term goal 1: perform functional transfers with CGA and LRD  Short term goal 2: perform LB ADL tasks with Min A   Short term goal 3: V/D Good understanding of AE and/or modified/compensatory techniques for increased safety and independence with self care and mobility tasks  Short term goal 4: V/D Good understanding of sternal precautions as they relate to self care and mobility tasks  Short term goal 5: increase standing tolerance to 5-7 minutes with 0-1 UE support during functional task  Short term goal 6: actively participate in 30 min therapeutic exercise/activity to promote increased safety and independence with self care and mobility

## 2019-05-07 NOTE — PROGRESS NOTES
Pulling;5# Lifting Restrictions  Sternal Precautions: Emergent CABG X4  Other position/activity restrictions: Fall Risk    Subjective: Patient reporting he will be discharged to SNF tomorrow ([facility] [address] [address] Pleasant without complaints. Vital Signs  Patient Currently in Pain: Denies                   Bed Mobility:   Bed Mobility  Supine to Sit: Supervision  Sit to Supine: Supervision  Scooting: Supervision  Bed mobility  Scooting: Supervision    Transfers:  Sit to Stand: Stand by assistance  Stand to sit: Stand by assistance  Bed to Chair: Stand by assistance  Stand Pivot Transfers: Stand by assistance           Ambulation 1  Surface: level tile  Device: Rolling Walker  Other Apparatus: Right;Dorsiflex Assist  Assistance: Stand by assistance  Quality of Gait: High steppage on right LE to clear floor with right foot drop  Distance: 200 ft  Comments: Antalgic gait due to right foot drop. Foot up brace does help but not completely. Patient has AFO but cannot wear it as of yet due to wound on right calf  Ambulation 2  Surface - 2: level tile;uneven;carpet;ramp  Device 2: Rolling Walker  Other Apparatus 2: Right;Dorsiflex Assist  Assistance 2: Stand by assistance  Quality of Gait 2: Occassional trip over right toes with self-correct  Distance: 500 ft plus  Comments: Slow ant steady gait with standing rest breaks as needed.      Stairs/Curb  Stairs?: Yes  Stairs  # Steps : 18  Stairs Height: 8\"  Rails: Right ascending  Device: No Device  Other Apparatus: Right;Dorsiflexon assist  Assistance: Stand by assistance  Comment: Step to gait pattern                                                     FIMS:      TRANSFERS  Bed, Chair, Wheel Chair: 5 - Requires setup/supervision/cues   LOCOMOTION  Primary Mode: Walk  Distance Walked: 200 ft  Walk: 5 - Supervision Requires standby supervision or cuing to walk at least 150 feet  Stairs: 5- Supervision Requires supervision(e.g., standing by, cuing, or coaxing) to go up and down one flight of stairs       BALANCE Posture: Fair  Sitting - Static: Good(EOM, no back or UE support)  Sitting - Dynamic: Good(EOM, no back or UE support)  Standing - Static: Fair;+(RW)  Standing - Dynamic: Fair(RW)    EXERCISES    Other exercises?: Yes  Other exercises 2: Nustep x 15 min L4   Other exercises 3: Seated bilateral LE x 20 reps 2 1/2 lbs  Other exercises 4: TBand x 20 reps  Other exercises 5: Standing bilateral LE x 15 reps  Other exercises 6: UBE standing x 10 min f/b  Other Activities  Other Activities: Community Outing(With recreational therapist - [employer], [employer])        Activity Tolerance: Patient Tolerated treatment well  Activity Tolerance: not able to bear wt on right enough to advance left LE. Fatigue with exercises  PT Equipment Recommendations  Equipment Needed: No  Other: SNF will provide       Patient Education  New Education Provided: Safety with transfers. Use of AFO in the future after right calf wound heal up. Learner:patient  Method: demonstration and explanation       Outcome: demonstrated understanding and needs reinforcement     Current Treatment Recommendations: ROM, Strengthening, Functional Mobility Training, Endurance Training, Transfer Training, Gait Training, Stair training, Balance Training, Neuromuscular Re-education, Home Exercise Program, Safety Education & Training, Patient/Caregiver Education & Training, Equipment Evaluation, Education, & procurement    Conditions Requiring Skilled Therapeutic Intervention  Body structures, Functions, Activity limitations: Decreased functional mobility ; Decreased strength;Decreased endurance;Decreased coordination;Decreased safe awareness;Decreased cognition;Decreased ROM; Decreased balance  Treatment Diagnosis: decreased functional mobility  Patient Education: Safety with transfers. Use of AFO in the future after right calf wound heal up.     REQUIRES PT FOLLOW UP: Yes  Discharge Recommendations: Home with assist PRN    Goals  Short term goals  Time Frame for Short term goals: 1 week  Short term goal 1: Pt to perform bed mobility mod I  Short term goal 2: Pt to perform transfers CGA with use of RW  Short term goal 3: Pt to amb  ft with RW and CGA with no rest break  Short term goal 4: Pt to ascend/descend 5 steps with bilateral handrail and min A x1  Long term goals  Time Frame for Long term goals : by discharge  Long term goal 1: Pt to perform bed mobility independent  Long term goal 2: pt to perform transfers with least restrictive DME and supervision  Long term goal 3: pt to amb with least restrictive -250 ft supervision  Long term goal 4: Pt to ascend/descend a flight of stairs with R handrail supervision  Long term goal 5: Pt to demonstrate understanding of HEP       05/07/19 1015 05/07/19 1300   PT Individual Minutes   Time In 1030 1300   Time Out 1100 1345   Minutes 30 39   PT Concurrent Minutes   Time In 4816  --    Time Out 1006  --    Minutes 15  --        Electronically signed by Tito Mitchell PTA on 5/7/19 at 3:48 PM

## 2019-05-07 NOTE — PLAN OF CARE
Problem: Falls - Risk of:  Goal: Will remain free from falls  Description  Will remain free from falls  Outcome: Ongoing   No falls or injury this shift. Bed is maintained at the lowest level, brakes engaged, call light and assistive devices in reach. Room is clutter free, adequate lighting for safe transfers and ambulation. Assistance provided for transfers and toileting. Problem: Pain:  Goal: Pain level will decrease  Description  Pain level will decrease  Outcome: Ongoing   Patient assessed for pain, medicated per orders. Patient reports an acceptable level of discomfort with the current medications. Patient was repositioned and cold/heat therapy applied.    Problem: Pain:  Goal: Control of acute pain  Description  Control of acute pain  Outcome: Ongoing

## 2019-05-07 NOTE — PROGRESS NOTES
x  Shoulder Extension: x  Elbow Flexion: x  Elbow Extension: x  Other: forward/backward arm circles. Additional Activities: Other  Additional Activities: golf yogesh placemenet into cylindrical pegboard intermittently between B hands to address [address] decreased skills using 9 hole peg test.      Functional Mobility  Functional - Mobility Device: Rolling Walker  Activity: To/from bathroom  Assist Level: Stand by assistance  Functional Mobility Comments: imporved safety noted. OT FIM:   Eatin - Feeds self with adaptive equipment/dentures and/or feeds self with modified diet and/or performs own tube feeding  Groomin - Requires setup/cues to do all tasks(supervision in standing. )  Bathin - Did not occur(pt declined this date. )  Dressing-Upper: 5 - Requires setup/supervision/cues and/or requires assist with presthesis/brace only(sba/sup c set up )  Dressing-Lower: 5 - Requires setup/supervision/cues and/or staff applies TEDS/prosthesis/brace only(SBA/sup )  Toiletin - Requires setup/supervision/cues  Toilet Transfer: 5 - Requires setup/supervision/cues  Primary Mode: Shower  Tub Transfer: 0 - Activity does not occur  Shower Transfer: 0 - Activity does not occur(pt declined today. )    Assessment  Performance deficits / Impairments: Decreased functional mobility ; Decreased cognition;Decreased high-level IADLs;Decreased endurance;Decreased ADL status; Decreased balance;Decreased safe awareness;Decreased strength;Decreased coordination  Prognosis: Good  Discharge Recommendations: Outpatient OT  Activity Tolerance: Patient Tolerated treatment well  Safety Devices in place: Yes  Type of devices: Call light within reach; Left in chair;Gait belt([facility] arrived to transport pt in w/c)        Plan  Continue current plan of care        19 0835   OT Individual Minutes   Time In [facility] 28   Time Out 0819   Minutes 46     Electronically signed by VENANCIO Colbert on 19 at 10:52 AM

## 2019-05-08 LAB
GLUCOSE BLD-MCNC: 110 MG/DL (ref 75–110)
GLUCOSE BLD-MCNC: 152 MG/DL (ref 75–110)
GLUCOSE BLD-MCNC: 95 MG/DL (ref 75–110)
HCT VFR BLD CALC: 34.3 % (ref 41–53)
HEMOGLOBIN: 11.1 G/DL (ref 13.5–17.5)
MCH RBC QN AUTO: 29.6 PG (ref 26–34)
MCHC RBC AUTO-ENTMCNC: 32.3 G/DL (ref 31–37)
MCV RBC AUTO: 91.5 FL (ref 80–100)
NRBC AUTOMATED: ABNORMAL PER 100 WBC
PDW BLD-RTO: 14.9 % (ref 11.5–14.9)
PLATELET # BLD: 342 K/UL (ref 150–450)
PMV BLD AUTO: 7.9 FL (ref 6–12)
RBC # BLD: 3.75 M/UL (ref 4.5–5.9)
WBC # BLD: 9.8 K/UL (ref 3.5–11)

## 2019-05-08 PROCEDURE — 6370000000 HC RX 637 (ALT 250 FOR IP): Performed by: INTERNAL MEDICINE

## 2019-05-08 PROCEDURE — 99232 SBSQ HOSP IP/OBS MODERATE 35: CPT | Performed by: INTERNAL MEDICINE

## 2019-05-08 PROCEDURE — 82947 ASSAY GLUCOSE BLOOD QUANT: CPT

## 2019-05-08 PROCEDURE — 36415 COLL VENOUS BLD VENIPUNCTURE: CPT

## 2019-05-08 PROCEDURE — 97110 THERAPEUTIC EXERCISES: CPT

## 2019-05-08 PROCEDURE — 97127 HC SP THER IVNTJ W/FOCUS COG FUNCJ: CPT

## 2019-05-08 PROCEDURE — 6370000000 HC RX 637 (ALT 250 FOR IP): Performed by: NURSE PRACTITIONER

## 2019-05-08 PROCEDURE — 99232 SBSQ HOSP IP/OBS MODERATE 35: CPT | Performed by: PHYSICAL MEDICINE & REHABILITATION

## 2019-05-08 PROCEDURE — 6370000000 HC RX 637 (ALT 250 FOR IP): Performed by: STUDENT IN AN ORGANIZED HEALTH CARE EDUCATION/TRAINING PROGRAM

## 2019-05-08 PROCEDURE — 97530 THERAPEUTIC ACTIVITIES: CPT

## 2019-05-08 PROCEDURE — 6370000000 HC RX 637 (ALT 250 FOR IP): Performed by: PHYSICAL MEDICINE & REHABILITATION

## 2019-05-08 PROCEDURE — 97116 GAIT TRAINING THERAPY: CPT

## 2019-05-08 PROCEDURE — 6360000002 HC RX W HCPCS: Performed by: NURSE PRACTITIONER

## 2019-05-08 PROCEDURE — 1180000000 HC REHAB R&B

## 2019-05-08 PROCEDURE — 85027 COMPLETE CBC AUTOMATED: CPT

## 2019-05-08 PROCEDURE — 97150 GROUP THERAPEUTIC PROCEDURES: CPT

## 2019-05-08 RX ADMIN — POLYETHYLENE GLYCOL 3350 17 G: 17 POWDER, FOR SOLUTION ORAL at 07:29

## 2019-05-08 RX ADMIN — ASPIRIN 81 MG 81 MG: 81 TABLET ORAL at 07:29

## 2019-05-08 RX ADMIN — LISINOPRIL 2.5 MG: 5 TABLET ORAL at 07:28

## 2019-05-08 RX ADMIN — TRAZODONE HYDROCHLORIDE 50 MG: 50 TABLET ORAL at 20:09

## 2019-05-08 RX ADMIN — INSULIN LISPRO 1 UNITS: 100 INJECTION, SOLUTION INTRAVENOUS; SUBCUTANEOUS at 20:09

## 2019-05-08 RX ADMIN — CEPHALEXIN 500 MG: 500 CAPSULE ORAL at 23:58

## 2019-05-08 RX ADMIN — ATORVASTATIN CALCIUM 40 MG: 40 TABLET, FILM COATED ORAL at 20:09

## 2019-05-08 RX ADMIN — FAMOTIDINE 20 MG: 20 TABLET ORAL at 07:28

## 2019-05-08 RX ADMIN — CEPHALEXIN 500 MG: 500 CAPSULE ORAL at 00:06

## 2019-05-08 RX ADMIN — DOCUSATE SODIUM 100 MG: 100 CAPSULE, LIQUID FILLED ORAL at 20:09

## 2019-05-08 RX ADMIN — MULTIPLE VITAMINS W/ MINERALS TAB 1 TABLET: TAB at 07:29

## 2019-05-08 RX ADMIN — OXYCODONE HYDROCHLORIDE 5 MG: 5 TABLET ORAL at 07:28

## 2019-05-08 RX ADMIN — CEPHALEXIN 500 MG: 500 CAPSULE ORAL at 13:03

## 2019-05-08 RX ADMIN — DOXYCYCLINE 100 MG: 100 CAPSULE ORAL at 20:09

## 2019-05-08 RX ADMIN — DOCUSATE SODIUM 100 MG: 100 CAPSULE, LIQUID FILLED ORAL at 07:28

## 2019-05-08 RX ADMIN — ENOXAPARIN SODIUM 40 MG: 100 INJECTION SUBCUTANEOUS at 07:28

## 2019-05-08 RX ADMIN — FUROSEMIDE 20 MG: 20 TABLET ORAL at 07:29

## 2019-05-08 RX ADMIN — OXYCODONE HYDROCHLORIDE 5 MG: 5 TABLET ORAL at 20:09

## 2019-05-08 RX ADMIN — METOPROLOL TARTRATE 25 MG: 25 TABLET ORAL at 20:09

## 2019-05-08 RX ADMIN — CEPHALEXIN 500 MG: 500 CAPSULE ORAL at 05:54

## 2019-05-08 RX ADMIN — FAMOTIDINE 20 MG: 20 TABLET ORAL at 20:09

## 2019-05-08 RX ADMIN — CLOPIDOGREL BISULFATE 75 MG: 75 TABLET ORAL at 07:29

## 2019-05-08 RX ADMIN — DOXYCYCLINE 100 MG: 100 CAPSULE ORAL at 07:29

## 2019-05-08 RX ADMIN — METOPROLOL TARTRATE 25 MG: 25 TABLET ORAL at 07:28

## 2019-05-08 RX ADMIN — CEPHALEXIN 500 MG: 500 CAPSULE ORAL at 17:23

## 2019-05-08 RX ADMIN — INSULIN GLARGINE 24 UNITS: 100 INJECTION, SOLUTION SUBCUTANEOUS at 07:29

## 2019-05-08 ASSESSMENT — PAIN SCALES - GENERAL
PAINLEVEL_OUTOF10: 4
PAINLEVEL_OUTOF10: 3
PAINLEVEL_OUTOF10: 0
PAINLEVEL_OUTOF10: 4
PAINLEVEL_OUTOF10: 4

## 2019-05-08 ASSESSMENT — PAIN DESCRIPTION - DESCRIPTORS
DESCRIPTORS: NUMBNESS
DESCRIPTORS: NUMBNESS;TINGLING

## 2019-05-08 ASSESSMENT — 9 HOLE PEG TEST
TEST_RESULT: FUNCTIONAL
TESTTIME_SECONDS: 33
TEST_RESULT: IMPAIRED
TESTTIME_SECONDS: 27

## 2019-05-08 ASSESSMENT — PAIN DESCRIPTION - FREQUENCY: FREQUENCY: INTERMITTENT

## 2019-05-08 ASSESSMENT — PAIN - FUNCTIONAL ASSESSMENT: PAIN_FUNCTIONAL_ASSESSMENT: ACTIVITIES ARE NOT PREVENTED

## 2019-05-08 ASSESSMENT — PAIN DESCRIPTION - LOCATION
LOCATION: FOOT
LOCATION: FOOT

## 2019-05-08 ASSESSMENT — PAIN DESCRIPTION - ORIENTATION
ORIENTATION: RIGHT
ORIENTATION: RIGHT

## 2019-05-08 ASSESSMENT — PAIN DESCRIPTION - PAIN TYPE
TYPE: ACUTE PAIN
TYPE: ACUTE PAIN

## 2019-05-08 ASSESSMENT — PAIN DESCRIPTION - PROGRESSION: CLINICAL_PROGRESSION: NOT CHANGED

## 2019-05-08 ASSESSMENT — PAIN DESCRIPTION - ONSET: ONSET: ON-GOING

## 2019-05-08 NOTE — PLAN OF CARE
Problem: Risk for Impaired Skin Integrity  Goal: Tissue integrity - skin and mucous membranes  Description  Structural intactness and normal physiological function of skin and  mucous membranes. Outcome: Ongoing  Zero obvious new skin issues so far this shift. Safety maintained this shift. Will continue to monitor. Problem: Falls - Risk of:  Goal: Will remain free from falls  Description  Will remain free from falls  Outcome: Ongoing  Patient remains free from falls/injuries at this time. Call light within reach. Safety maintained this shift. Will continue to monitor. Problem: Pain:  Goal: Pain level will decrease  Description  Pain level will decrease  Outcome: Ongoing  Pain controlled with prn medications this shift. Safety maintained this shift. Will continue to monitor.

## 2019-05-08 NOTE — PROGRESS NOTES
[facility]    Progress Note    5/7/2019    9:10 PM    Name:   Jennifer Murray  MRN:     [id number]     Acct:      [de-identified]   Room:   [address] [place] [address] Day:  12  Admit Date:  4/25/2019  7:49 PM    PCP:   No primary care provider on file. Code Status:  Full Code    Subjective:     C/C: No chief complaint on file. Interval History Status: improved. HPI:   Patient had a cardiac arrest, found to have multivessel coronary artery disease, had CABG, patient had Stroke with Speech Difficulties   Infected Hard ware was Removed From Right leg     5/6   stable    Leg pain djd   to seee ortho    5/7   stable       Review of Systems:     Constitutional:  negative for chills, fevers, sweats  Respiratory:  negative for cough, dyspnea on exertion, hemoptysis, shortness of breath, wheezing  Cardiovascular:  negative for chest pain, chest pressure/discomfort, lower extremity edema, palpitations  Gastrointestinal:  negative for abdominal pain, constipation, diarrhea, nausea, vomiting  Neurological:  Speech difficulties, foot drop present in right leg    Medications:      Allergies:  No Known Allergies    Current Meds:   Scheduled Meds:    insulin glargine  24 Units Subcutaneous Daily    cephALEXin  500 mg Oral 4 times per day    lisinopril  2.5 mg Oral Daily    furosemide  20 mg Oral Daily    metoprolol tartrate  25 mg Oral BID    traZODone  50 mg Oral Nightly    enoxaparin  40 mg Subcutaneous Daily    atorvastatin  40 mg Oral Nightly    clopidogrel  75 mg Oral Daily    famotidine  20 mg Oral BID    therapeutic multivitamin-minerals  1 tablet Oral Daily with breakfast    aspirin  81 mg Oral Daily    doxycycline monohydrate  100 mg Oral 2 times per day    insulin lispro  0-12 Units Subcutaneous TID     insulin lispro  0-6 Units Subcutaneous Nightly    docusate sodium  100 mg Oral BID    polyethylene glycol  17 g Oral Daily     Continuous Infusions:    dextrose PRN Meds: oxyCODONE **OR** [DISCONTINUED] oxyCODONE, dextrose, dextrose, glucagon (rDNA), glucose, magnesium hydroxide, albuterol, bisacodyl    Data:     Past Medical History:   has a past medical history of CAD (coronary artery disease), Cardiac arrest ([facility] Osteolysis, [facility] and PVD (peripheral vascular disease) [facility] Social History:   reports that he has quit smoking. He does not have any smokeless tobacco history on file. Family History: No family history on file. Vitals:  /67   Pulse 96   Temp 97.9 °F (36.6 °C) (Oral)   Resp 18   Ht 5' 8.11\" (1.73 m)   Wt 145 lb 1.6 oz (65.8 kg)   SpO2 99%   BMI 21.99 kg/m²   Temp (24hrs), Av.1 °F (36.7 °C), Min:97.9 °F (36.6 °C), Max:98.2 °F (36.8 °C)    Recent Labs     19  0618 19  1159 19  1610 19   POCGLU 109 197* 111* 194*       I/O (24Hr): Intake/Output Summary (Last 24 hours) at 2019  Last data filed at 2019  Gross per 24 hour   Intake --   Output 315 ml   Net -315 ml       Labs:    [unfilled]    Lab Results   Component Value Date/Time    SPECIAL NOT REPORTED 2019 04:08 PM     Lab Results   Component Value Date/Time    CULTURE (A) 2019 04:08 PM     STAPHYLOCOCCUS SPECIES, COAGULASE NEGATIVE SCANT GROWTH Susceptibilities have not been performed. Notify the laboratory within 7 days for further workup if clinically indicated.     CULTURE NO ANAEROBIC ORGANISMS ISOLATED AT 5 DAYS (A) 2019 04:08 PM       [unfilled]    Radiology:      Physical Examination:        General appearance:  alert, cooperative and no distress  Mental Status:  oriented to person, place and time and normal affect  Lungs:  clear to auscultation bilaterally, normal effort  Heart:  regular rate and rhythm, no murmur  Abdomen:  soft, nontender, nondistended, normal bowel sounds, no masses, hepatomegaly, splenomegaly  Extremities: Dressing present and right leg , Splint present   Skin:  no gross lesions, rashes, induration    Assessment:        Primary Problem  <principal problem not specified>    Active Hospital Problems    Diagnosis Date Noted    Cellulitis of right lower extremity [Y15.684]     [facility] [E44.0] 04/26/2019    Acute CVA (cerebrovascular accident) ([facility] [I63.9] 04/25/2019    Acute cerebral infarction associated with systemic hypoxia or ischemia [I63.89]     Subacute osteomyelitis of right tibia ([facility] [V76.509]        Plan:        1. Cardiac arrest, status post CABG with multivessel coronary artery disease  2. Acute stroke. Speech difficulties, patient is on aspirin, Lipitor, Plavix  3. Congestive heart failure, both systolic and diastolic, ejection fraction is 40%, patient is on Lasix, Aldactone, lisinopril, Lopressor  4. Infected implant removed from right leg, she was on doxycycline  5. Diabetes, controlled, on sliding scale and long-acting insulin  6.  On DVT prophylaxis  7.     4/28/19  Doing better   BP controlled   Pain Better   Advising Carb Control diet     4/29   Has Readings of Low Blood Pressure   Hyponatremia   Seen By cardiologist   Diuretics and Antihypertensives on Hold   High Blood sugars   Not Compliant with Diet   Lantus to 35     4/30   Patient blood sugar is better  Started on Lopressor, will resume Lasix from tomorrow, blood pressure is better controlled  Wound on right leg is healthy    5/1  Patient doing better,  Liver enzymes are  improvement  Blood sugars better controlled  5/2  BP Better Controlled   Blood sugars Controlled   5/3  Patient BP is Controlled   DM - Controlled   Added Keflex per ID   5/5  Readings of Low Blood sugars   Reducing dose of Lantus by 3 units    5/6     bs low at times   need to regulate diet   bp controlled     Cont po keflex     To see ortho for knee    Carotid stenosis    to f/u with vascular    d/c plan for 5/8 to ecf     bp controlled   off bp meds      5/7   doing well bs controlled   bp controlled   dkd rt knee   f/u ortho   onk to d/c in am  Coy Wagner MD  5/7/2019  9:10 PM

## 2019-05-08 NOTE — PROGRESS NOTES
Infectious disease Consult Note      Patient: Philip Wolfe  : 1965  Acct#:  [id number]     Date:  2019    Subjective:       History of Present Illness  Patient is a 46 y.o.  male admitted to  [facility] acute rehab unit S/P hospitalization at [place] for Cardiac arrest  Acute CVA (cerebrovascular accident)  who is seen in consult for increase right calf swelling and redness . He was admitted initially to [facility] on 2019 with increased shortness of breath and found to have PEA arrest,had emergent cardiac cath which revealed multivessel coronary artery disease with severe LV dysfunction followed by  CABG ×4 on 19 with delayed closure of chest wall on 19. His hospital course was complicated by acute renal failure . CT brain was suggestive of infarct. He had history of right proximal tibia fracture status post ORIF apparently he was not compliant with follow-up and ended up with a hardware protruding through the skin. The hardware was removed on 2019 STAPHYLOCOCCUS SPECIES, COAGULASE NEGATIVE SCANT GROWTH on tissue cultures. He was placed on oral doxycycline chronically for suspected infected hardware and chronic osteomyelitis. Interval history:  He is feeling better ,less redness and swelling to the right calf area. and redness at below knee surgical incision improving ,no drainage ,denied pain   No new complaints. He denied any fever or chills, denied nausea vomiting or diarrhea.     Past Medical History:   Diagnosis Date    CAD (coronary artery disease) 2019    Cardiac arrest ([facility] [address] 2019    Osteolysis 2019    R Knee hardware    PEA (Pulseless electrical activity) ([facility] [address] 2019    PVD (peripheral vascular disease) ([facility] [address] 2019      Past Surgical History:   Procedure Laterality Date    CARDIAC CATHETERIZATION  2019    MVD-Stv    CORONARY ARTERY BYPASS GRAFT  04/07/2019    x4 per Dr. Lorelei Rao, [facility] reactive to light  ENT: hearing grossly normal bilaterally  Neck: neck supple and non tender without mass  Pulmonary/Chest: clear to auscultation bilaterally- no wheezes, rales or rhonchi, normal air movement, no respiratory distress  Cardiovascular: normal rate, regular rhythm, normal S1 and S2, no murmurs. Abdomen: soft, non-tender, non-distended  Extremities: no cyanosis, clubbing  Right calf area of erythema, induration and tenderness,no fluctuation or crepitations better . Surgical incision below her right knee with mild surrounding erythema, no purulent drainage no crepitance no fluctuation. Musculoskeletal: normal range of motion, no joint swelling, deformity or tenderness      Data Review:        Imaging Studies:                           All appropriate imaging studies and reports reviewed: Yes       Xr Femur Right (min 2 Views)    Result Date: 4/7/2019  EXAMINATION: 2 XRAY VIEWS OF THE RIGHT FEMUR 4/7/2019 3:28 pm COMPARISON: None. HISTORY: ORDERING SYSTEM PROVIDED HISTORY: Trauma/Fracture TECHNOLOGIST PROVIDED HISTORY: Trauma/Fracture FINDINGS: The femur appears intact. The hip and knee are in anatomic alignment. Hardware is noted in the proximal tibia. Soft tissues are unremarkable. No fracture     Xr Knee Right (3 Views)    Result Date: 4/7/2019  EXAMINATION: 3 XRAY VIEWS OF THE RIGHT KNEE 4/7/2019 5:00 pm COMPARISON: 04/07/2019 HISTORY: ORDERING SYSTEM PROVIDED HISTORY: s/p Hardware removal at bedside TECHNOLOGIST PROVIDED HISTORY: s/p Hardware removal at bedside FINDINGS: Interval removal of anterior superior fixating screw. No periprosthetic fracture. Chronic deformity of the proximal fibula. Degenerative changes of knee and tib-fib syndesmosis. Removal of anterior-superior fixating screw without interval change otherwise. Xr Knee Right (3 Views)    Result Date: 4/7/2019  EXAMINATION: 3 XRAY VIEWS OF THE RIGHT KNEE 4/7/2019 3:28 pm COMPARISON: None.  HISTORY: ORDERING SYSTEM PROVIDED HISTORY: Trauma/Fracture TECHNOLOGIST PROVIDED HISTORY: Trauma/Fracture FINDINGS: Sideplate and 4 screws transfix the proximal tibia laterally. Old healed fracture proximal fibular neck and head. Spurring lateral tibia plateau. Proximal tibiofibular syndesmosis. Visualized femur and patella normal. Soft tissues unremarkable. ORIF proximal tibia. No acute fracture. Xr Tibia Fibula Right (2 Views)    Result Date: 4/22/2019  Radiology exam is complete. No Radiologist dictation. Please follow up with ordering provider. Ct Head Wo Contrast    Result Date: 4/13/2019  EXAMINATION: CT OF THE HEAD WITHOUT CONTRAST  4/13/2019 4:29 pm TECHNIQUE: CT of the head was performed without the administration of intravenous contrast. Dose modulation, iterative reconstruction, and/or weight based adjustment of the mA/kV was utilized to reduce the radiation dose to as low as reasonably achievable. COMPARISON: 04/12/2019 HISTORY: ORDERING SYSTEM PROVIDED HISTORY: f/u ct, s/p cardiac arrest, ? attenuation in L parietal lobe TECHNOLOGIST PROVIDED HISTORY: Ordering Physician Provided Reason for Exam: f/u ct, s/p cardiac arrest, ? attenuation in L parietal lobe Acuity: Unknown Type of Exam: Unknown FINDINGS: BRAIN/VENTRICLES: There is no evidence of acute infarct or hemorrhage. Previously suspected left parietal hypodensity is better seen on the current study to represent partial volume averaging of a sulcus. Bilateral anterior frontal and high left frontoparietal encephalomalacia is re- demonstrated. There is no hydrocephalus or extra-axial fluid collection. Midline is maintained. Basal cisterns are patent. ORBITS: The visualized portion of the orbits demonstrate no acute abnormality. SINUSES: Diffuse paranasal sinus mucoperiosteal thickening. Trace bilateral mastoid effusions. These findings may relate to intubation. SOFT TISSUES/SKULL:  No acute abnormality of the visualized skull or soft tissues.      1. No acute intracranial abnormality. 2. Stable multifocal encephalomalacia. Ct Head Wo Contrast    Result Date: 4/12/2019  EXAMINATION: CT OF THE HEAD WITHOUT CONTRAST  4/12/2019 10:25 am TECHNIQUE: CT of the head was performed without the administration of intravenous contrast. Dose modulation, iterative reconstruction, and/or weight based adjustment of the mA/kV was utilized to reduce the radiation dose to as low as reasonably achievable. COMPARISON: None. HISTORY: ORDERING SYSTEM PROVIDED HISTORY: NEURO DEFICIT(S), SUBACUTE, PROGRESSIVE OR FLUCTUATING TECHNOLOGIST PROVIDED HISTORY: Ordering Physician Provided Reason for Exam: History of Present Illness: This is a 46 y.o. male with unknown medical history admitted for management of cardiac arrest and STEMI. Patient initially called EMS for worsening SOB, placed on CPAP, given breathing treatments, solumedrol, aspirin, transport initiated to [facility]. EKG done by EMS showed possible anterior septal STEMI. On arrival to [facility] ED, pulses were lost. ACLS protocol initiated, CPR initiated patient intubated. EKG showed PEA. Three rounds CPR with epi given, had return of pulses. Bedside ECHO at that time showed globally decreased systolic contraction. Cardiology consulted, agreed for cath. Cath done showed multivessel CAD with severe LV systolic dysfunction EF 67% with severe hypokinesis of ant and mid inferior wall and apical akinesis. Right iliac a also noted to be occluded. FINDINGS: BRAIN/VENTRICLES: There is no acute intracranial hemorrhage, mass effect or midline shift. No abnormal extra-axial fluid collection. The gray-white differentiation is maintained without evidence of an acute infarct. There is no evidence of hydrocephalus. Encephalomalacia is present in the left frontal lobe consistent with remote infarct. Punctate density is noted along the encephalomalacia which may represent an area of dystrophic calcification.   An additional area of encephalomalacia is noted in the high left parietal lobe. An area of encephalomalacia is also present in the right frontal lobe, and there is hypodensity in the left posterior parietal lobe which may represent subacute to chronic infarct. Scattered hypodensities are present in the white matter consistent with chronic microvascular change. Vertebral and internal carotid arteries are calcified. Small extradural calcification is noted near the vertex, right side. This may represent a small meningioma without mass effect of 8 mm. ORBITS: The visualized portion of the orbits demonstrate no acute abnormality. SINUSES: Moderate mucoperiosteal thickening is noted in the maxillary and sphenoid sinuses. Frontal sinuses are hypoplastic. Significant mucoperiosteal thickening is noted in the ethmoid air cells. Mastoid air cells are appropriately aerated. SOFT TISSUES/SKULL:  No acute abnormality of the visualized skull or soft tissues. Estimated biologic radiation dose for this procedure:1059.07 mGy/cm2. Scattered areas of encephalomalacia, bifrontal and high left parietal areas consistent with remote infarct. There is hypodensity in the left lower parietal region possibly a subacute infarct. Chronic microvascular changes are noted. A punctate density is noted adjacent to the encephalomalacia in the left frontal lobe, favoring dystrophic calcification over petechial hemorrhage. RECOMMENDATIONS: MRI imaging of the brain is suggested given differing ages of the ischemic changes in the patient's history. Nm Hepatobiliary    Result Date: 4/15/2019  EXAMINATION: NUCLEAR MEDICINE HEPATOBILIARY SCINTIGRAPHY (HIDA SCAN). 4/15/2019 8:28 am TECHNIQUE: Approximately 3 [id number] Mebrofenin (Choletec) was administered IV. Then, dynamic images of the abdomen were obtained in the anterior projection for 60 mins. COMPARISON: Renal ultrasound April 12, 2019.  HISTORY: ORDERING SYSTEM PROVIDED HISTORY: CHOLECYSTITIS TECHNOLOGIST PROVIDED HISTORY: Ordering Physician Provided Reason for Exam: Thickened GB wall·Hepatitis  - possible amiodarone, GB w sludge FINDINGS: Prompt, homogenous uptake by the liver is noted with normal appearance of radiotracer excretion into the biliary system. Clearance of bloodpool activity appears appropriate. Gallbladder and small bowel is visualized in appropriate sequence. No scintigraphic evidence of biliary obstruction. Us Renal Complete    Result Date: 4/13/2019  EXAMINATION: RETROPERITONEAL ULTRASOUND OF THE KIDNEYS 4/12/2019 COMPARISON: None HISTORY: ORDERING SYSTEM PROVIDED HISTORY: RENAL FAILURE, ACUTE (KIDNEY INJURY) TECHNOLOGIST PROVIDED HISTORY: increased creatinine FINDINGS: Kidneys: The right kidney measures 12.5 cm in length and the left kidney measures 12.6 cm in length. Kidneys demonstrate normal cortical echogenicity. No evidence of hydronephrosis or intrarenal stones. There is 1.1 x 1.0 x 0.8 cm left renal cyst. Other: Partially imaged gallbladder sludge. Apparent gallbladder wall thickening. 1. No evidence of hydronephrosis. Normal renal cortical echogenicity. 2. Partially imaged gallbladder sludge. Apparent gallbladder wall thickening. Consider further evaluation with dedicated right upper quadrant ultrasound. Xr Chest Portable    Result Date: 4/19/2019  EXAMINATION: SINGLE XRAY VIEW OF THE CHEST 4/19/2019 6:38 am COMPARISON: April 17, 2019 HISTORY: ORDERING SYSTEM PROVIDED HISTORY: intubated TECHNOLOGIST PROVIDED HISTORY: intubated FINDINGS: Endotracheal tube with the tip just below the level of the clavicles. Enteric tube with the tip within the stomach. Partial clearing at the right lung base. Persistent left lung base opacities. Sternotomy. Cardiomegaly. Skin staples. No definite pulmonary edema. Partial clearing at the right lung base. Persistent left lung base opacity.      Xr Chest Portable    Result Date: 4/17/2019  EXAMINATION: SINGLE XRAY VIEW OF THE CHEST 4/17/2019 10:13 am COMPARISON: April 15, 2019 HISTORY: ORDERING SYSTEM PROVIDED HISTORY: possible pneumonia TECHNOLOGIST PROVIDED HISTORY: possible pneumonia Ordering Physician Provided Reason for Exam: possible pneumonia. upright portable. FINDINGS: ET tube in good position. Enteric tube passes beneath the diaphragm. Intact sternotomy wires. Cardiac leads overlie the chest.  Enlarged heart with moderate pulmonary edema. Bilateral pleural effusions. No pneumothorax. Stable moderate pulmonary edema and effusions. Stable supporting devices. Xr Chest Portable    Result Date: 4/15/2019  EXAMINATION: SINGLE XRAY VIEW OF THE CHEST 4/15/2019 6:15 am COMPARISON: 04/14/2019, 531 hours HISTORY: ORDERING SYSTEM PROVIDED HISTORY: intubated, s/p [facility] TECHNOLOGIST PROVIDED HISTORY: intubated, s/p [facility] 50-year-old male who is intubated status post open heart surgery FINDINGS: Portable supine view of the chest. Endotracheal tube distal tip overlying the mid trachea approximately 3.6 cm above the level of the ramu. Enteric tube traverses the GE junction with distal tip projecting over the medial left upper quadrant likely within the fundus of the stomach. Midline skin staples project over the chest.  Prior median sternotomy and CABG. Cardiac monitor leads overlie the chest. No obvious pneumothorax on limited portable supine imaging. Moderate parahilar and lower zone predominant airspace opacities, left greater than right. Retrocardiac airspace consolidation. Small left-sided pleural effusion. Trace right-sided pleural effusion. Stable mild cardiomegaly. Cardiac and mediastinal contours remain unchanged. Visualized osseous structures remain unchanged. No mediastinal shift. 1. Moderate parahilar and lower zone predominant airspace opacities, left greater than right. Retrocardiac airspace consolidation. Small left-sided pleural effusion. Trace right-sided pleural effusion.  Findings may represent CHF related opacities could be due to pulmonary edema. 2.  Increased left basilar opacity is likely atelectasis. Xr Chest Portable    Result Date: 4/7/2019  EXAMINATION: SINGLE XRAY VIEW OF THE CHEST 4/7/2019 12:18 pm COMPARISON: 4/7/2019 HISTORY: ORDERING SYSTEM PROVIDED HISTORY: Post op open heart surgery TECHNOLOGIST PROVIDED HISTORY: Post op open heart surgery FINDINGS: [place]arly catheter directed into the right main pulmonary artery, new since the prior exam.  Mediastinal drain, NG tube, and ET tube are present. Heart is enlarged. Moderate to severe pulmonary edema, slightly improved from prior. 1. Slight improvement in pulmonary edema with interval placement of [place]Carly catheter. 2. No pneumothorax. 3. Supporting devices are otherwise stable. Xr Chest Portable    Result Date: 4/7/2019  EXAMINATION: SINGLE XRAY VIEW OF THE CHEST 4/7/2019 5:14 am COMPARISON: None. HISTORY: ORDERING SYSTEM PROVIDED HISTORY: cardiac arrest TECHNOLOGIST PROVIDED HISTORY: cardiac arrest FINDINGS: Diffuse patchy bilateral airspace opacities. Endotracheal tube tip is about 5 cm above the ramu. Gastric tube tip projects over the proximal stomach. Costophrenic angles are clear. Cardiac and mediastinal structures are unremarkable. Diffuse patchy bilateral airspace opacities could be due to edema or multifocal pneumonia. Cta Neck W Contrast    Result Date: 4/18/2019  EXAMINATION: CTA OF THE HEAD WITH CONTRAST; CTA OF THE NECK 4/18/2019 5:31 pm: TECHNIQUE: CTA of the head/brain was performed with the administration of intravenous contrast. Multiplanar reformatted images are provided for review. MIP images are provided for review.  Dose modulation, iterative reconstruction, and/or weight based adjustment of the mA/kV was utilized to reduce the radiation dose to as low as reasonably achievable.; CTA of the neck was performed with the administration of intravenous contrast. Multiplanar reformatted images are provided for demonstrates mild stenosis at this ostium but otherwise grossly patent. Ofelia Mitchell SOFT TISSUES: Large bilateral pleural fluid collections. Bibasilar airspace disease and atelectasis. Chronic emphysematous changes. .  No cervical or superior mediastinal lymphadenopathy. The visualized portion of the larynx and pharynx appear unremarkable. The parotid, submandibular and thyroid glands demonstrate no acute abnormality. BONES: The visualized osseous structures demonstrate multilevel degenerative changes. . CTA HEAD: ANTERIOR CIRCULATION: Right petrous segment, cavernous segments cervical segments are patent. Left petrous, cavernous segments are occluded. Reconstitution left carotid terminus likely due to combination of extracranial and intracranial collateral flow. The anterior cerebral and middle cerebral arteries demonstrate no focal stenosis. POSTERIOR CIRCULATION: Bilateral P comms. The posterior cerebral arteries demonstrate no focal stenosis. The vertebral and basilar arteries appear unremarkable. BRAIN: No mass effect or midline shift. No hyperdense extra-axial fluid collection. Chronic encephalomalacia left TOAN distribution involving both inferior frontal lobes. The gray-white differentiation appears grossly maintained. Air-fluid levels both sphenoid sinuses and posterior ethmoid air cells. Occlusion left ICA vertebral artery  extending to level skull base. Moderate atherosclerotic changes identified involving the remainder of the cervical arterial vasculature. Reconstitution left carotid terminus. Otherwise no hemodynamic stenosis or occlusion identified involving intracranial circulation . Cta Head W Contrast    Result Date: 4/18/2019  EXAMINATION: CTA OF THE HEAD WITH CONTRAST; CTA OF THE NECK 4/18/2019 5:31 pm: TECHNIQUE: CTA of the head/brain was performed with the administration of intravenous contrast. Multiplanar reformatted images are provided for review. MIP images are provided for review. Dose modulation, iterative reconstruction, and/or weight based adjustment of the mA/kV was utilized to reduce the radiation dose to as low as reasonably achievable.; CTA of the neck was performed with the administration of intravenous contrast. Multiplanar reformatted images are provided for review. MIP images are provided for review. Stenosis of the internal carotid arteries measured using NASCET criteria. Dose modulation, iterative reconstruction, and/or weight based adjustment of the mA/kV was utilized to reduce the radiation dose to as low as reasonably achievable. 3D reconstructed images were performed on a separate workstation and provided for review. COMPARISON: CT head 04/12/2019 HISTORY: ORDERING SYSTEM PROVIDED HISTORY: STROKE TECHNOLOGIST PROVIDED HISTORY: Ordering Physician Provided Reason for Exam: STROKE S/P CABG Additional signs and symptoms: PT INTUBATED FINDINGS: CTA NECK: AORTIC ARCH/ARCH VESSELS: Moderate plaque identified involving the aortic arch is visualized. 3 vessel configuration of the aortic arch. Moderate severe stenosis identified involving the left common carotid artery origin and proximal common carotid artery and involving the left iliac artery. Motion artifact through the ostia great vessels challenge evaluation. There is 60-70% stenosis identified involving the proximal left common carotid artery and left subclavian artery. Mild-to-moderate focal stenosis identified involving the ostium of the right brachiocephalic artery measuring 30%. CAROTID ARTERIES: Left common carotid artery demonstrates moderate diffuse atherosclerotic disease with focal stenosis involving the left ostium measuring 60-70%. Right common carotid artery noted is mild diffuse atherosclerotic disease without hemodynamic stenosis or occlusion. . Right internal carotid artery demonstrates moderate diffuse atherosclerotic disease without up 60% stenosis per NASCET criteria.   Left internal carotid artery is occluded at its origin sent to level skull base. No dissection or arterial injury is seen on the right. . 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 Right vertebral artery demonstrates mild stenosis at this ostium but otherwise grossly patent. Suezanne Franklyn SOFT TISSUES: Large bilateral pleural fluid collections. Bibasilar airspace disease and atelectasis. Chronic emphysematous changes. .  No cervical or superior mediastinal lymphadenopathy. The visualized portion of the larynx and pharynx appear unremarkable. The parotid, submandibular and thyroid glands demonstrate no acute abnormality. BONES: The visualized osseous structures demonstrate multilevel degenerative changes. . CTA HEAD: ANTERIOR CIRCULATION: Right petrous segment, cavernous segments cervical segments are patent. Left petrous, cavernous segments are occluded. Reconstitution left carotid terminus likely due to combination of extracranial and intracranial collateral flow. The anterior cerebral and middle cerebral arteries demonstrate no focal stenosis. POSTERIOR CIRCULATION: Bilateral P comms. The posterior cerebral arteries demonstrate no focal stenosis. The vertebral and basilar arteries appear unremarkable. BRAIN: No mass effect or midline shift. No hyperdense extra-axial fluid collection. Chronic encephalomalacia left TOAN distribution involving both inferior frontal lobes. The gray-white differentiation appears grossly maintained. Air-fluid levels both sphenoid sinuses and posterior ethmoid air cells. Occlusion left ICA vertebral artery  extending to level skull base. Moderate atherosclerotic changes identified involving the remainder of the cervical arterial vasculature. Reconstitution left carotid terminus. Otherwise no hemodynamic stenosis or occlusion identified involving intracranial circulation .      Mri Brain Wo Contrast    Result Date: 4/14/2019  EXAMINATION: MRI OF THE BRAIN WITHOUT CONTRAST  4/14/2019 8:40 pm TECHNIQUE: Multiplanar multisequence MRI of the brain was performed without the administration of intravenous contrast. COMPARISON: CT head earlier today. HISTORY: ORDERING SYSTEM PROVIDED HISTORY: DECREASED ALERTNESS Initial encounter. FINDINGS: INTRACRANIAL STRUCTURES/VENTRICLES: There are small foci of diffusion restriction in the right brachium pontis, right cerebellar hemisphere, posterior left and right frontal centrum semiovale white matter, and the posterior right frontal corona radiata white matter. Bilateral anterior frontal and high left frontoparietal encephalomalacia is noted with surrounding gliosis. No mass effect or midline shift. No evidence of an acute intracranial hemorrhage. The ventricles and sulci are normal in size and configuration. The sellar/suprasellar regions appear unremarkable. There is absent flow void in the visualized left internal carotid artery. ORBITS: Orbital structures are unremarkable. SINUSES: Dependent paranasal sinus mucoperiosteal thickening and layering fluid with bilateral mastoid effusions may relate to intubation. BONES/SOFT TISSUES: The bone marrow signal intensity appears normal. The soft tissues demonstrate no acute abnormality. 1. Multiple acute ischemic infarcts in the right brachium pontis, right cerebellar hemisphere, and bilateral cerebral white matter as detailed above with distribution in multiple vascular territories suggesting central embolic etiology. 2. No hemorrhage or mass effect. 3. Bilateral anterior frontal and high left frontoparietal encephalomalacia consistent with sequela of prior insult. 4. Absent flow void in the left internal carotid artery consistent with high-grade proximal stenosis or occlusion. Assessment:     Right calf cellulitis .   Chronic steomyelitis of right tibia /infected hardware that was removed on

## 2019-05-08 NOTE — CARE COORDINATION
[address] [facility]   Date: 2019    Patient Care Needs  Patient Name: Channing Credit       Room: 4020/5973-89 D/C : 19  : 1965  (46 y.o.)     Gender: male   Diagnosis: Cardiac Arrest and stroke    Vision  Vision: Within Functional Limits  Hearing  Hearing: Within functional limits    Personal Equipment and Devices: (eyeglasses, hearing,aids, dentures)  Assistive Devices: Brandt Sos, Wheelchair    Feeding / Swallow:   Eatin - Feeds self with adaptive equipment/dentures and/or feeds self with modified diet and/or performs own tube feeding    Diet Ordered: Carb Control 4 Carbs/Meal  Supplements: Standard High Calorie Oral Supplement       Precautions: Precautions: Fall risk, Sternal,Restrictions/Precautions  Restrictions/Precautions: Cardiac, General Precautions, Surgical Protocols, Fall Risk(telesitter)  Required Braces or Orthoses?: Yes(R AFO R procare boot)  Position Activity Restriction  Sternal Precautions: No Pushing, No Pulling, 5# Lifting Restrictions  Sternal Precautions: Emergent CABG X4  Other position/activity restrictions: Fall Risk,Additional Pertinent Hx: Pt admitted to [facility] ED 19 with cardiac arrest and increased SOB. Pt underwent emergent CABG x4 19 with delayed closure on 19. Pt was intubated from 19 to 19. Pt also underwent screw removal from ORIF of R knee on 19 and I&D on the R knee 19.  Pt admitted to [facility] for rehabiliation on 19  Restrictions/Precautions: Cardiac, General Precautions, Surgical Protocols, Fall Risk(telesitter) Required Braces or Orthoses?: Yes(R AFO R procare boot)    Mobility Equipment needs:  Device: Rolling Walker Assistance: Stand by assistance    Bed Mobility:  Bed mobility  Bridging: Stand by assistance(Pt c/o chest pain; exercise aborted after 2 reps)  Rolling to Left: Modified independent  Rolling to Right: Stand by assistance  Supine to Sit: Stand by assistance  Sit to Supine: Stand by assistance  Scooting: Supervision  Comment: VC for proper technique for bed mobility required    Transfers:   Sit to Stand: Stand by assistance   Bed to Chair: Stand by assistance Stand Pivot Transfers: Stand by assistance      Posture: Fair      Ambulation Status:  Assistance: Stand by assistance  Quality of Gait: High steppage on right LE to clear floor with right foot drop Distance: 200 ft  Device: Rolling Walker  Other Apparatus: Right, Dorsiflex Assist  Comments: Patient continues with high steppage gait pattern without dorsi assist (foot up). Has an AFO but unable to use at this time due to open wounds on back of right calf.   Stairs  # Steps : 18  Stairs Height: 8\"  Rails: Right ascending  Device: No Device  Other Apparatus: Right;Dorsiflexon assist  Assistance: Stand by assistance  Comment: Step to gait pattern     Self-care Equipment Needs:  Equipment Needed: (TBD)    Self-care Assist Bathin - Did not occur(pt delcined today )  Dressing-Upper: 0 - Did not occur  Dressing-Lower: 5 - Requires setup/supervision/cues and/or staff applies TEDS/prosthesis/brace only(setup/sup c donning socks/shoes. )  Toiletin - Requires setup/supervision/cues(per report)  Toilet Transfer: 5 - Requires setup/supervision/cues(per report. )    Nursing   Toilet Every 2 Hours-In Advance of Need: No (Comment)(ABLE TO VERBALIZE NEEDS )Activity: Up to chair,Activity and Safety  Activity: Up to chair  Repositioned: Turns self  Assistive Device: Wheelchair, Front wheel walker, Gait belt  Anti-Embolism Devices: Bilateral, Pneumatic compression devices, below knee  Anti-Embolism Intervention: Refused(PT EDUCATED ON PURPOSE/IMPORTANCE )  Heels/Feet: Heel(s) elevated while up in chair,Activity: Up to chair,Pressure Ulcer or Non-Healing Wound: No  Alarm On: Personal   Bladder: 5 - Voids, but staff empties urinal/BSC and/or requires setup/sup/cues to urinate (e.g. timed voids/self cath) (no accidents, no touching) (help w/I&O cath)  Bowel: 6 - Uses toilet independently with device or oral medication(s)    Wound Care Documentation and Therapy:   Wound 04/07/19 Knee Right (Active)   Wound Image    4/26/2019  3:40 PM   Wound Non-Healing Surgical 5/1/2019  9:35 AM   Dressing Status Clean;Dry; Intact 5/7/2019  7:00 PM   Dressing Changed Changed/New 5/7/2019  7:15 AM   Dressing/Treatment ABD; Dry Dressing;Vaseline gauze 5/7/2019  7:00 PM   Wound Cleansed Other (Comment) 5/1/2019  9:35 AM   Dressing Change Due 05/08/19 5/7/2019  7:15 AM   Wound Length (cm) 0.8 cm 4/26/2019  3:40 PM   Wound Width (cm) 0.7 cm 4/26/2019  3:40 PM   Wound Depth (cm) 1.3 cm 4/26/2019  3:40 PM   Wound Surface Area (cm^2) 0.56 cm^2 4/26/2019  3:40 PM   Wound Volume (cm^3) 0.73 cm^3 4/26/2019  3:40 PM   Wound Assessment OTONIEL 5/7/2019  7:00 PM   Drainage Amount None 5/7/2019  7:00 PM   Drainage Description Sanguinous 5/1/2019  9:35 AM   Odor None 5/7/2019  7:00 PM   Margins OTONIEL 4/29/2019  8:18 PM   Shana-wound Assessment OTONIEL 5/7/2019  7:00 PM   Red%Wound Bed 100 4/26/2019  3:40 PM   Culture Taken No 4/22/2019  3:15 AM   Number of days: 30       Wound 04/09/19 Coccyx Inner small slit of purple on inner coccyx (Active)   Wound Pressure Stage  2 4/12/2019  8:00 PM   Dressing Status Clean;Dry 4/13/2019  4:00 AM   Dressing Changed Changed/New 4/11/2019  4:00 PM   Dressing/Treatment Foam 4/13/2019  4:00 AM   Dressing Change Due 04/14/19 4/11/2019  4:00 AM   Wound Length (cm) 2 cm 4/9/2019 12:18 PM   Wound Assessment Purple 4/13/2019  4:00 AM   Drainage Amount None 4/13/2019  4:00 AM   Odor None 4/13/2019  4:00 AM   Shana-wound Assessment Clean;Dry;Ecchymosis; Red 4/13/2019  4:00 AM   Purple%Wound Bed 100 4/9/2019  4:52 PM   Number of days: 29       Wound 04/12/19 Heel Right dark purple area  (Active)   Wound Deep tissue/Injury 5/4/2019 10:30 PM   Dressing Status Clean;Dry; Intact 5/7/2019  7:00 PM   Dressing Changed Changed/New 4/30/2019  3:19 PM   Dressing/Treatment Open to air 5/7/2019  7:00 PM   Dressing Change Due 05/04/19 5/4/2019  8:30 AM   Wound Length (cm) 5 cm 4/26/2019  3:40 PM   Wound Width (cm) 5.8 cm 4/26/2019  3:40 PM   Wound Depth (cm) 0 cm 4/26/2019  3:40 PM   Wound Surface Area (cm^2) 29 cm^2 4/26/2019  3:40 PM   Wound Volume (cm^3) 0 cm^3 4/26/2019  3:40 PM   Wound Assessment Dusky; Purple 5/7/2019  7:00 PM   Drainage Amount None 5/7/2019  7:00 PM   Odor None 5/1/2019  9:35 AM   Margins OTONIEL 4/28/2019  8:01 AM   Shana-wound Assessment Dark edges 5/7/2019  7:00 PM   Culture Taken No 4/21/2019  9:40 AM   Number of days: 25       Wound 04/13/19 Buttocks purple area to buttock and coccyx (Active)   Wound Pressure Unstageable 5/1/2019  9:35 AM   Dressing Status Clean;Dry; Intact 5/7/2019  7:00 PM   Dressing Changed Dressing reinforced 5/3/2019  8:45 AM   Dressing/Treatment Foam 5/7/2019  7:00 PM   Wound Cleansed Not Cleansed 5/1/2019  9:35 AM   Dressing Change Due 05/06/19 5/7/2019  7:15 AM   Wound Assessment OOTNIEL 5/7/2019  7:00 PM   Drainage Amount None 5/7/2019  7:00 PM   Drainage Description Tan 4/25/2019  8:10 AM   Odor None 4/25/2019  7:45 PM   Shana-wound Assessment OTONIEL 5/7/2019  7:00 PM   Culture Taken No 4/21/2019  9:40 AM   Number of days: 25       Wound 04/14/19 Buttocks (Active)   Wound Skin Tear 4/19/2019  8:00 PM   Dressing Status Other (Comment) 4/21/2019  4:16 PM   Dressing Changed Changed/New 4/21/2019  4:00 AM   Dressing/Treatment Foam 4/21/2019  4:00 AM   Dressing Change Due 04/23/19 4/21/2019  4:00 AM   Wound Assessment Purple;Pink;Red;Dry 4/21/2019  4:16 PM   Drainage Amount None 4/21/2019  4:16 PM   Odor None 4/21/2019  4:16 PM   Shana-wound Assessment Red; Intact 4/21/2019  4:16 PM   Number of days: 24       Wound 04/26/19 Coccyx (Active)   Wound Image   4/26/2019  3:40 PM   Wound Pressure Unstageable 5/1/2019  9:35 AM   Dressing Status Clean;Dry; Intact 5/7/2019  7:00 PM   Dressing Changed Other (Comment) 5/1/2019  9:35 AM   Dressing/Treatment Foam 5/7/2019  7:00 PM   Wound Cleansed Not Cleansed 5/1/2019  9:35 AM   Dressing Change Due 05/06/19 5/7/2019  7:15 AM   Wound Length (cm) 1.8 cm 4/26/2019  3:40 PM   Wound Width (cm) 0.6 cm 4/26/2019  3:40 PM   Wound Depth (cm) 0.2 cm 4/26/2019  3:40 PM   Wound Surface Area (cm^2) 1.08 cm^2 4/26/2019  3:40 PM   Wound Volume (cm^3) 0.22 cm^3 4/26/2019  3:40 PM   Wound Assessment OTONIEL 5/7/2019  7:00 PM   Drainage Amount None 4/28/2019 12:42 PM   Drainage Description Yellow 4/26/2019  3:40 PM   Odor None 4/28/2019 12:42 PM   Margins Defined edges 4/28/2019 12:42 PM   Shana-wound Assessment Clean;Dry; Intact 5/7/2019  7:00 PM   Yellow%Wound Bed 100 4/26/2019  3:40 PM   Number of days: 11       Wound 04/26/19 Heel Left (Active)   Wound Image   4/26/2019  3:40 PM   Wound Deep tissue/Injury 5/4/2019 10:30 PM   Dressing Status Clean;Dry; Intact 5/3/2019  8:45 AM   Dressing/Treatment Open to air 5/7/2019  7:00 PM   Wound Length (cm) 2 cm 4/26/2019  3:40 PM   Wound Width (cm) 7 cm 4/26/2019  3:40 PM   Wound Depth (cm) 0 cm 4/26/2019  3:40 PM   Wound Surface Area (cm^2) 14 cm^2 4/26/2019  3:40 PM   Wound Volume (cm^3) 0 cm^3 4/26/2019  3:40 PM   Wound Assessment Dry; Intact; Purple 5/7/2019  7:00 PM   Drainage Amount None 5/7/2019  7:00 PM   Shana-wound Assessment Clean;Dry; Intact 5/7/2019  7:00 PM   Number of days: 11       Wound 04/26/19 Arm Medial Forearm (Active)   Wound Other 5/1/2019  9:35 AM   Dressing Status Clean;Dry; Intact 5/7/2019  7:00 PM   Dressing Changed Other (Comment) 5/3/2019  8:45 AM   Dressing/Treatment Foam 5/7/2019  7:00 PM   Wound Cleansed Other (Comment) 5/1/2019  9:35 AM   Wound Length (cm) 1.1 cm 4/26/2019  3:59 PM   Wound Width (cm) 1.2 cm 4/26/2019  3:59 PM   Wound Depth (cm) 0.1 cm 4/26/2019  3:59 PM   Wound Surface Area (cm^2) 1.32 cm^2 4/26/2019  3:59 PM   Wound Volume (cm^3) 0.13 cm^3 4/26/2019  3:59 PM   Wound Assessment OTONIEL 5/7/2019  7:00 PM   Drainage Amount None 5/7/2019  7:00 PM   Drainage Description Other

## 2019-05-08 NOTE — PROGRESS NOTES
Speech Language Pathology  Speech Language Pathology  [facility]    Cognitive Treatment Note    Date: 5/8/2019  Patients Name: Janiya Chisholm  MRN: [id number]  Diagnosis:   Patient Active Problem List   Diagnosis Code    PAD (peripheral artery disease) ([facility]) I73.9    Subacute osteomyelitis of right tibia ([facility] J38.401    Metabolic encephalopathy A24.13    Abnormal CT of the head R93.0    Hepatitis K75.9    Acute cerebral infarction associated with systemic hypoxia or ischemia I63.89    Multi infarct state I69.30    Carotid stenosis, bilateral I65.23    Right foot drop M21.371    Thrombocytosis ([facility]) D47.3    Acute CVA (cerebrovascular accident) ([facility] I63.9    Moderate malnutrition ([facility] E44.0    Cellulitis of right lower extremity L03.115       Pain: 0/10    Cognitive Treatment    Treatment time: 6128-8696    Subjective: [x] Alert [x] Cooperative     [] Confused     [] Agitated    [] Lethargic    Objective/Assessment:  Attention: functional    Recall:  n/a     Organization:  n/a    Problem Solving/Reasoning: planning projects puzzle- min A. Follow written directions- 100%    Other:     Plan:  [x] Continue ST services    [] Discharge from ST:      Discharge recommendations: [] Inpatient Rehab   [] [place]   [] Outpatient Therapy  [] Follow up at trauma clinic   [] Other:       Treatment completed by: Azalea Taylor A.CCC/SLP

## 2019-05-08 NOTE — CARE COORDINATION
Acute Rehab Unit Full FIM Progress    Admission score Current score    Goal       Self-Care     Eatin - Feeds self with setup/supervision/cues and/or requires only setup/supervision/cues to perform tube feedings(set up assist ) 6 - Feeds self with adaptive equipment/dentures and/or feeds self with modified diet and/or performs own tube feeding 7   Groomin - Requires setup/cues to do all tasks(set up assist, oral care, hair care, wash/dry face/hands) 5 - Requires setup/cues to do all tasks(supervision) 7   Bathin - Able to bathe all 10 areas with setup/sup/cues(UB only per preference with set up) 0 - Did not occur(pt delcined today ) 6   Dressing-Upper: 5 - Requires setup/supervision/cues and/or requires assist with presthesis/brace only(set up assistance for overhead shirt) 0 - Did not occur 6   Dressing-Lower: 1 - Total assist with lower body dressing(TA with pants, underwear, socks) 5 - Requires setup/supervision/cues and/or staff applies TEDS/prosthesis/brace only(setup/sup c donning socks/shoes. ) 6   Toiletin - Did not occur(declined) 5 - Requires setup/supervision/cues(per report) 6     Spincter Control     Bladder: 1 - Total assist to manage (changing soiled clothing, linens, and/or diapers) > 75% assist    5 - Voids, but staff empties urinal/BSC and/or requires setup/sup/cues to urinate (e.g. timed voids/self cath) (no accidents, no touching) (help w/I&O cath) 5             Bladder Frequency of Accidents: 3 - Three accidents (soiling linens or clothing) in past 7 days, includes bedpan or urinal spills by patient    6 - No accidents,uses device like urinal, bedpan, absorbant pad, or requires medication to manage bladder    Bowel: 6 - Uses toilet independently with device or oral medication(s)    6 - Uses toilet independently with device or oral medication(s) 6   Bowel Level of Assistance: 6- Requires device like bedpan, diaper, bedside commode, but patient obtains and empties own device or device (hearing aid/glasses) 6   Expression: 4 - Expresses basic ideas/needs 75-90%+ of the time Expression: 6 - Device used to express complex ideas/needs 6     Social Cognition     Social Interaction: 5 - Patient is appropriate with supervision/cues Social Interaction: 6 - Patient requires medication for mood and/or effect 6   Problem Solving: 3 - Patient solves simple/routine tasks 50%-74% 6 - Independent with device (e.g. notes, schedules) 5   Memory: 3 - Patient remembers 50%-74% of the time 6 - Patient requires device to recall (e.g. memory book) 5

## 2019-05-08 NOTE — PROGRESS NOTES
polyethylene glycol  17 g Oral Daily     Continuous Infusions:    dextrose       PRN Meds: oxyCODONE **OR** [DISCONTINUED] oxyCODONE, dextrose, dextrose, glucagon (rDNA), glucose, magnesium hydroxide, albuterol, bisacodyl    Data:     Past Medical History:   has a past medical history of CAD (coronary artery disease), Cardiac arrest ([facility] Osteolysis, PEA (Pulseless electrical activity) ([facility] and PVD (peripheral vascular disease) ([facility] Social History:   reports that he has quit smoking. He does not have any smokeless tobacco history on file. Family History: No family history on file. Vitals:  BP (!) 143/67   Pulse 93   Temp 98.1 °F (36.7 °C) (Oral)   Resp 18   Ht 5' 8.11\" (1.73 m)   Wt 145 lb 1.6 oz (65.8 kg)   SpO2 99%   BMI 21.99 kg/m²   Temp (24hrs), Av °F (36.7 °C), Min:97.9 °F (36.6 °C), Max:98.1 °F (36.7 °C)    Recent Labs     19  1610 19  0650 19  1559   POCGLU 111* 194* 95 110       I/O (24Hr): Intake/Output Summary (Last 24 hours) at 2019 1924  Last data filed at 2019 1805  Gross per 24 hour   Intake 1786 ml   Output 1400 ml   Net 386 ml       Labs:    [unfilled]    Lab Results   Component Value Date/Time    SPECIAL NOT REPORTED 2019 04:08 PM     Lab Results   Component Value Date/Time    CULTURE (A) 2019 04:08 PM     STAPHYLOCOCCUS SPECIES, COAGULASE NEGATIVE SCANT GROWTH Susceptibilities have not been performed. Notify the laboratory within 7 days for further workup if clinically indicated.     CULTURE NO ANAEROBIC ORGANISMS ISOLATED AT 5 DAYS (A) 2019 04:08 PM       [unfilled]    Radiology:      Physical Examination:        General appearance:  alert, cooperative and no distress  Mental Status:  oriented to person, place and time and normal affect  Lungs:  clear to auscultation bilaterally, normal effort  Heart:  regular rate and rhythm, no murmur  Abdomen:  soft, nontender, nondistended, normal bowel sounds, no plan for 5/8 to ecf     bp controlled   off bp meds      5/7   doing well bs controlled   bp controlled   dkd rt knee   f/u ortho   onk to d/c in am  ecf     5/8     stable   rt knee incision infection  Cont po atb      bp controlled   no fever    bs beter hypoglycemia resolved    Lucien Marinelli MD  5/8/2019  7:24 PM

## 2019-05-08 NOTE — PROGRESS NOTES
[address]  Acute Rehabilitation Physical Therapy Progress Note    Date: 19  Patient Name: Colleen Dorantes       Room: 2880/3028-19  MRN: [id number]   Account: [de-identified]   : 1965  (46 y.o.) Gender: male     Referring Practitioner: Pamela Uribe MD  Diagnosis: Acute CVA, cardiac arrest, CABG  Past Medical History:  has a past medical history of CAD (coronary artery disease), Cardiac arrest ([facility] Osteolysis, PEA (Pulseless electrical activity) ([facility] and PVD (peripheral vascular disease) ([facility] Past Surgical History:   has a past surgical history that includes Cardiac catheterization (2019); Femur Surgery; Tonsillectomy; Coronary artery bypass graft (2019); Hardware Removal (Right, 2019); Tibia fracture surgery (Right); Coronary artery bypass graft (N/A, 2019); RECONSTRUCTIVE REPAIR STERNAL (N/A, 2019); Hardware Removal (Right, 2019); and REMOVE HARDWARE FEMUR (N/A, 2019). Additional Pertinent Hx: Pt admitted to [place] [address] ED 19 presenting with cardiac arrest and increased SOB. Pt underwent emergent CABG x4 19 with delayed closure on 19. Pt was intubated from 19 to 19. Pt also underwent screw removal from ORIF of R knee bedside on 19. Hardware removal of R tibia and I&D on 19. Pt admitted to [address] [place] [address] for intense rehabiliation on 19. 19 MRI shows R cerebellum infarct. CT on 19 shows Lower parietal infarct. Overall Orientation Status: Within Functional Limits  Restrictions/Precautions  Restrictions/Precautions: Cardiac;General Precautions;Surgical Protocols; Fall Risk(telesitter)  Required Braces or Orthoses?: Yes(R AFO, R procare boot)  Required Braces or Orthoses  Right Lower Extremity Brace: Ankle Foot Orthotics  RLE Brace Type: Profo boot and AFO  Left Lower Extremity Brace: Boot  LLE Brace Type: Profo boot  Position Activity Restriction  Sternal Precautions: No Pushing; No Pulling;5# Lifting Restrictions  Sternal Precautions: Emergent CABG X4  Other position/activity restrictions: Fall Risk    Subjective: Patient reports he is waiting for [facility] to accept him as a patient for discharge today. Vital Signs  Patient Currently in Pain: Denies                   Bed Mobility:   Bed Mobility  Supine to Sit: Supervision  Sit to Supine: Supervision  Scooting: Supervision  Bed mobility  Scooting: Supervision    Transfers:  Sit to Stand: Stand by assistance  Stand to sit: Stand by assistance  Bed to Chair: Stand by assistance  Stand Pivot Transfers: Stand by assistance           Ambulation 1  Surface: level tile  Device: Rolling Walker  Assistance: Stand by assistance  Quality of Gait: High steppage on right LE to clear floor with right foot drop  Distance: 200 ft  Comments: Patient continues with high steppage gait pattern without dorsi assist (foot up). Has an AFO but unable to use at this time due to open wounds on back of right calf.         Stairs/Curb  Stairs?: Yes  Stairs  # Steps : 18  Stairs Height: 8\"  Rails: Right ascending  Device: No Device  Other Apparatus: Right;Dorsiflexon assist  Assistance: Stand by assistance  Comment: Step to gait pattern                                                     FIMS:      TRANSFERS  Bed, Chair, Wheel Chair: 5 - Requires setup/supervision/cues   LOCOMOTION  Primary Mode: Walk  Distance Walked: 200 ft  Walk: 5 - Supervision Requires standby supervision or cuing to walk at least 150 feet  Stairs: 5- Supervision Requires supervision(e.g., standing by, cuing, or coaxing) to go up and down one flight of stairs       BALANCE Posture: Fair  Sitting - Static: Good(EOM, no back or UE support)  Sitting - Dynamic: Good(EOM, no back or UE support)  Standing - Static: Fair;+(RW)  Standing - Dynamic: Fair(RW)    EXERCISES    Other exercises?: Yes  Other exercises 2: Nustep x 15 min L4   Other exercises 3: Seated bilateral LE x 20 reps 2 1/2 lbs           Activity Tolerance: Patient Tolerated treatment well  Activity Tolerance: not able to bear wt on right enough to advance left LE. Fatigue with exercises  PT Equipment Recommendations  Equipment Needed: No       Patient Education  New Education Provided: Home Exercise Program  Learner:patient  Method: demonstration and explanation       Outcome: demonstrated understanding     Current Treatment Recommendations: ROM, Strengthening, Functional Mobility Training, Endurance Training, Transfer Training, Gait Training, Stair training, Balance Training, Neuromuscular Re-education, Home Exercise Program, Safety Education & Training, Patient/Caregiver Education & Training, Equipment Evaluation, Education, & procurement    Conditions Requiring Skilled Therapeutic Intervention  Body structures, Functions, Activity limitations: Decreased functional mobility ; Decreased strength;Decreased endurance;Decreased coordination;Decreased safe awareness;Decreased cognition;Decreased ROM; Decreased balance  Treatment Diagnosis: decreased functional mobility  Patient Education: Safety with ambulation without dorsi-assist on right LE  REQUIRES PT FOLLOW UP: Yes  Discharge Recommendations: Home with assist PRN    Goals  Short term goals  Time Frame for Short term goals: 1 week  Short term goal 1: Pt to perform bed mobility mod I  Short term goal 2: Pt to perform transfers CGA with use of RW  Short term goal 3: Pt to amb  ft with RW and CGA with no rest break  Short term goal 4: Pt to ascend/descend 5 steps with bilateral handrail and min A x1  Long term goals  Time Frame for Long term goals : by discharge  Long term goal 1: Pt to perform bed mobility independent  Long term goal 2: pt to perform transfers with least restrictive DME and supervision  Long term goal 3: pt to amb with least restrictive -250 ft supervision  Long term goal 4: Pt to ascend/descend a flight of stairs with R handrail supervision  Long term goal 5: Pt to demonstrate understanding of HEP       05/08/19 1225   PT Concurrent Minutes   Time In 1045   Time Out 1115   Minutes 30     PM session - 0649 - 7150 for ambulation 200 ft with RW SBA and 18 steps with one handrail with SBA      Electronically signed by Jamilah Del Rio PTA on 5/8/19 at 12:29 PM

## 2019-05-08 NOTE — PROGRESS NOTES
[address] [facility]    ACUTE REHABILITATION OCCUPATIONAL THERAPY  DAILY NOTE    Date: 2019  Patient Name: Celine Yuen        MRN: [id number]    : 1965  (46 y.o.)  Gender: male   Referring Practitioner: Mitch Casillas MD  Diagnosis: Acute CVA, cardiac arrest, CABG    Restrictions  Restrictions/Precautions: Cardiac, General Precautions, Surgical Protocols, Fall Risk  Sternal Precautions: Emergent CABG X4  Other position/activity restrictions: Fall Risk  Required Braces or Orthoses  Other: Heart Hugger Brace  Right Lower Extremity Brace: Ankle Foot Orthotics  RLE Brace Type: Profo boot and AFO  Left Lower Extremity Brace: Boot  LLE Brace Type: Profo boot      Subjective  Patient Currently in Pain: Yes  Pain Level: 4  Pain Location: Foot  Pain Orientation: Right       Objective  Type of ROM/Therapeutic Exercise  Type of ROM/Therapeutic Exercise: Cane/Wand(3# 2x20-30 reps. )  Comment: Pt completes BUE ex's to address overall strength and endurance for functional tasks. Pt completed ex's in seated position, slow and controlled, shortened rest breaks req. All planes of mvmt within cardiac restrictions. Improved tolerance noted this date. Exercises  Scapular Protraction: x  Scapular Retraction: x  Shoulder Flexion: x  Shoulder Extension: x  Elbow Flexion: x  Elbow Extension: x  Other: forward/backward arm circles.             Left Hand Strength -  (lbs)  Handle Setting 2: 74# ave(increase of 38#)        Right Hand Strength -  (lbs)  Handle Setting 2: 74# ave(increase of 27#; NORMS #)        Fine Motor Skills  Left 9-Hole Peg Test: Functional  Left 9 Hole Peg Test Time (secs): 27(increase of 44 secs)  Right 9-Hole Peg Test: Impaired(Just outside NORMS)  Right 9 Hole Peg Test Time (secs): 33(increase of 23 secs)     OT FIM:   Eatin - Feeds self with adaptive equipment/dentures and/or feeds self with modified diet and/or performs own tube feeding  Groomin - Requires setup/cues to do all tasks(supervision)  Bathin - Did not occur(pt delcined today )  Dressing-Upper: 0 - Did not occur  Dressing-Lower: 5 - Requires setup/supervision/cues and/or staff applies TEDS/prosthesis/brace only(setup/sup c donning socks/shoes. )  Toiletin - Requires setup/supervision/cues(per report)  Toilet Transfer: 5 - Requires setup/supervision/cues(per report. )  Primary Mode: Shower  Tub Transfer: 0 - Activity does not occur  Shower Transfer: 0 - Activity does not occur    Assessment  Performance deficits / Impairments: Decreased functional mobility ; Decreased cognition;Decreased high-level IADLs;Decreased endurance;Decreased ADL status; Decreased balance;Decreased safe awareness;Decreased strength;Decreased coordination  Prognosis: Good  Discharge Recommendations: Outpatient OT  Activity Tolerance: Patient Tolerated treatment well  Safety Devices in place: Yes  Type of devices: Call light within reach; Left in chair;Gait belt        Patient Education:  Patient Education: transfer safety   Learner:patient  Method: demonstration and explanation       Outcome: needs reinforcement     Plan  Continue current plan of care        19 1002   OT Individual Minutes   Time In [address] [place] [address] Entrance   Time Out 4805   Minutes 31     Electronically signed by VENANCIO Schuster on 19 at 12:04 PM

## 2019-05-08 NOTE — FLOWSHEET NOTE
05/08/19 184   Encounter Summary   Services provided to: Patient   Referral/Consult From: Rounding   Complexity of Encounter Low   Length of Encounter 15 minutes   Spiritual/Bahai   Type Spiritual support   Assessment Sleeping   Intervention Prayer   Outcome Did not respond   Visited by [place]

## 2019-05-08 NOTE — PROGRESS NOTES
incision. Skin along incision healed. Abdomen- soft, positive bowel sounds non-distended, non-tender. No calf tenderness or edema. Motor strength-BUE-4+/5, LLE-4/5 hip and knee, DF trace, PF 3-/5, RLE-0/5 distally    Diagnostics:     CBC:   No results for input(s): WBC, RBC, HGB, HCT, MCV, RDW, PLT in the last 72 hours. BMP:   No results for input(s): NA, K, CL, CO2, PHOS, BUN, CREATININE in the last 72 hours. Invalid input(s): CA  BNP: No results for input(s): BNP in the last 72 hours. PT/INR: No results for input(s): PROTIME, INR in the last 72 hours. APTT: No results for input(s): APTT in the last 72 hours. CARDIAC ENZYMES: No results for input(s): CKMB, CKMBINDEX, TROPONINT in the last 72 hours. Invalid input(s): CKTOTAL;3  FASTING LIPID PANEL:  Lab Results   Component Value Date    CHOL 56 04/17/2019    HDL 13 (L) 04/17/2019    TRIG 107 04/17/2019     LIVER PROFILE:   No results for input(s): AST, ALT, ALB, BILIDIR, BILITOT, ALKPHOS in the last 72 hours.      Current Medications:   Current Facility-Administered Medications: oxyCODONE (ROXICODONE) immediate release tablet 5 mg, 5 mg, Oral, BID PRN **OR** [DISCONTINUED] oxyCODONE (ROXICODONE) immediate release tablet 10 mg, 10 mg, Oral, Q4H PRN  insulin glargine (LANTUS) injection vial 24 Units, 24 Units, Subcutaneous, Daily  cephALEXin (KEFLEX) capsule 500 mg, 500 mg, Oral, 4 times per day  lisinopril (PRINIVIL;ZESTRIL) tablet 2.5 mg, 2.5 mg, Oral, Daily  furosemide (LASIX) tablet 20 mg, 20 mg, Oral, Daily  metoprolol tartrate (LOPRESSOR) tablet 25 mg, 25 mg, Oral, BID  traZODone (DESYREL) tablet 50 mg, 50 mg, Oral, Nightly  enoxaparin (LOVENOX) injection 40 mg, 40 mg, Subcutaneous, Daily  atorvastatin (LIPITOR) tablet 40 mg, 40 mg, Oral, Nightly  clopidogrel (PLAVIX) tablet 75 mg, 75 mg, Oral, Daily  famotidine (PEPCID) tablet 20 mg, 20 mg, Oral, BID  therapeutic multivitamin-minerals 1 tablet, 1 tablet, Oral, Daily with breakfast  dextrose 5 % solution, 100 mL/hr, Intravenous, PRN  dextrose 50 % solution 12.5 g, 12.5 g, Intravenous, PRN  glucagon (rDNA) injection 1 mg, 1 mg, Intramuscular, PRN  glucose (GLUTOSE) 40 % oral gel 15 g, 15 g, Oral, PRN  magnesium hydroxide (MILK OF MAGNESIA) 400 MG/5ML suspension 30 mL, 30 mL, Oral, Daily PRN  albuterol (PROVENTIL) nebulizer solution 2.5 mg, 2.5 mg, Nebulization, Q6H PRN  aspirin chewable tablet 81 mg, 81 mg, Oral, Daily  doxycycline monohydrate (MONODOX) capsule 100 mg, 100 mg, Oral, 2 times per day  insulin lispro (HUMALOG) injection vial 0-12 Units, 0-12 Units, Subcutaneous, TID WC  insulin lispro (HUMALOG) injection vial 0-6 Units, 0-6 Units, Subcutaneous, Nightly  bisacodyl (DULCOLAX) suppository 10 mg, 10 mg, Rectal, Daily PRN  docusate sodium (COLACE) capsule 100 mg, 100 mg, Oral, BID  polyethylene glycol (GLYCOLAX) packet 17 g, 17 g, Oral, Daily      Impression/Plan:  Patient is a 76-year-old male with ADL and mobility dysfunction secondary to cardiac arrest, CABG, CVA, infected right lower extremity hardware, metabolic encephalopathy and respiratory failure    1. Continue acute inpatient rehab to work on improving functional deficits. Discharge plan 5/8/2019- awaiting insurance approval for skilled nursing facility. Lives in a group home. 2. CV-cardiac arrest CAD, myopathy, CABG ×4 on 4/7/2019. Appreciate cardiology follow-up. 3. Pulmonary-stable. 4. ID-history of chronic osteomyelitis of the leg with hardware removal on 4/22/2019. Residual right foot drop. Weightbearing as tolerated. On doxycycline 60 days for chronic suppression with leukocytosis which is improving. Keflex until 5/10/2019. Continue dressing changes/wound care. 5. Endocrine-history of diabetes on insulin sliding scale glucose as above. Continue monitoring. 6. Neurology- patient to follow-up with Dr. Cheryl Arana on 5/21/2019 for decision on stenting patient on aspirin and Plavix and Lipitor.   7. Pain- stable continue

## 2019-05-08 NOTE — PLAN OF CARE
Problem: Risk for Impaired Skin Integrity  Goal: Tissue integrity - skin and mucous membranes  Description  Structural intactness and normal physiological function of skin and  mucous membranes.   Outcome: Ongoing     Problem: Falls - Risk of:  Goal: Will remain free from falls  Description  Will remain free from falls  Outcome: Ongoing  Goal: Absence of physical injury  Description  Absence of physical injury  Outcome: Ongoing     Problem: Pain:  Goal: Pain level will decrease  Description  Pain level will decrease  Outcome: Ongoing  Goal: Control of acute pain  Description  Control of acute pain  Outcome: Ongoing  Goal: Control of chronic pain  Description  Control of chronic pain  Outcome: Ongoing     Problem: Nutrition  Goal: Optimal nutrition therapy  Outcome: Ongoing

## 2019-05-09 VITALS
BODY MASS INDEX: 22.37 KG/M2 | SYSTOLIC BLOOD PRESSURE: 130 MMHG | HEIGHT: 68 IN | WEIGHT: 147.6 LBS | OXYGEN SATURATION: 97 % | RESPIRATION RATE: 16 BRPM | HEART RATE: 90 BPM | TEMPERATURE: 97.9 F | DIASTOLIC BLOOD PRESSURE: 74 MMHG

## 2019-05-09 LAB
GLUCOSE BLD-MCNC: 157 MG/DL (ref 75–110)
GLUCOSE BLD-MCNC: 92 MG/DL (ref 75–110)

## 2019-05-09 PROCEDURE — 6370000000 HC RX 637 (ALT 250 FOR IP): Performed by: INTERNAL MEDICINE

## 2019-05-09 PROCEDURE — 99232 SBSQ HOSP IP/OBS MODERATE 35: CPT | Performed by: PHYSICAL MEDICINE & REHABILITATION

## 2019-05-09 PROCEDURE — 6370000000 HC RX 637 (ALT 250 FOR IP): Performed by: PHYSICAL MEDICINE & REHABILITATION

## 2019-05-09 PROCEDURE — 6360000002 HC RX W HCPCS: Performed by: NURSE PRACTITIONER

## 2019-05-09 PROCEDURE — 99231 SBSQ HOSP IP/OBS SF/LOW 25: CPT | Performed by: INTERNAL MEDICINE

## 2019-05-09 PROCEDURE — 6370000000 HC RX 637 (ALT 250 FOR IP): Performed by: NURSE PRACTITIONER

## 2019-05-09 PROCEDURE — 97127 HC SP THER IVNTJ W/FOCUS COG FUNCJ: CPT

## 2019-05-09 PROCEDURE — 6370000000 HC RX 637 (ALT 250 FOR IP): Performed by: STUDENT IN AN ORGANIZED HEALTH CARE EDUCATION/TRAINING PROGRAM

## 2019-05-09 PROCEDURE — 82947 ASSAY GLUCOSE BLOOD QUANT: CPT

## 2019-05-09 PROCEDURE — 97535 SELF CARE MNGMENT TRAINING: CPT

## 2019-05-09 PROCEDURE — 97150 GROUP THERAPEUTIC PROCEDURES: CPT

## 2019-05-09 RX ORDER — BISACODYL 10 MG
10 SUPPOSITORY, RECTAL RECTAL DAILY PRN
DISCHARGE
Start: 2019-05-09 | End: 2019-06-08

## 2019-05-09 RX ORDER — CLOPIDOGREL BISULFATE 75 MG/1
75 TABLET ORAL DAILY
Qty: 30 TABLET | Refills: 3 | DISCHARGE
Start: 2019-05-10 | End: 2019-07-08 | Stop reason: SDUPTHER

## 2019-05-09 RX ORDER — ATORVASTATIN CALCIUM 40 MG/1
40 TABLET, FILM COATED ORAL NIGHTLY
Qty: 30 TABLET | Refills: 3 | DISCHARGE
Start: 2019-05-09 | End: 2019-07-08 | Stop reason: SDUPTHER

## 2019-05-09 RX ORDER — PSEUDOEPHEDRINE HCL 30 MG
100 TABLET ORAL 2 TIMES DAILY
DISCHARGE
Start: 2019-05-09 | End: 2019-07-03

## 2019-05-09 RX ORDER — TRAZODONE HYDROCHLORIDE 50 MG/1
50 TABLET ORAL NIGHTLY
DISCHARGE
Start: 2019-05-09 | End: 2019-06-19 | Stop reason: SDUPTHER

## 2019-05-09 RX ORDER — FAMOTIDINE 20 MG/1
20 TABLET, FILM COATED ORAL 2 TIMES DAILY
Qty: 60 TABLET | Refills: 3 | DISCHARGE
Start: 2019-05-09 | End: 2019-09-27 | Stop reason: ALTCHOICE

## 2019-05-09 RX ORDER — DOXYCYCLINE 100 MG/1
100 CAPSULE ORAL EVERY 12 HOURS SCHEDULED
DISCHARGE
Start: 2019-05-09 | End: 2019-07-18 | Stop reason: ALTCHOICE

## 2019-05-09 RX ORDER — ASPIRIN 81 MG/1
81 TABLET, CHEWABLE ORAL DAILY
Qty: 30 TABLET | Refills: 3 | DISCHARGE
Start: 2019-05-10

## 2019-05-09 RX ORDER — LISINOPRIL 2.5 MG/1
2.5 TABLET ORAL DAILY
Qty: 30 TABLET | Refills: 3 | DISCHARGE
Start: 2019-05-10 | End: 2019-06-19 | Stop reason: SDUPTHER

## 2019-05-09 RX ORDER — OXYCODONE HYDROCHLORIDE 5 MG/1
5 TABLET ORAL 2 TIMES DAILY PRN
Qty: 6 TABLET | Refills: 0 | Status: SHIPPED | OUTPATIENT
Start: 2019-05-09 | End: 2019-05-12

## 2019-05-09 RX ORDER — INSULIN GLARGINE 100 [IU]/ML
24 INJECTION, SOLUTION SUBCUTANEOUS DAILY
Qty: 1 VIAL | Refills: 3 | DISCHARGE
Start: 2019-05-10 | End: 2019-06-19 | Stop reason: ALTCHOICE

## 2019-05-09 RX ORDER — FUROSEMIDE 20 MG/1
20 TABLET ORAL DAILY
Qty: 60 TABLET | Refills: 3 | DISCHARGE
Start: 2019-05-10 | End: 2019-07-03

## 2019-05-09 RX ORDER — M-VIT,TX,IRON,MINS/CALC/FOLIC 27MG-0.4MG
1 TABLET ORAL
DISCHARGE
Start: 2019-05-10

## 2019-05-09 RX ORDER — CEPHALEXIN 500 MG/1
500 CAPSULE ORAL 4 TIMES DAILY
Refills: 0 | DISCHARGE
Start: 2019-05-09 | End: 2019-05-11

## 2019-05-09 RX ADMIN — ENOXAPARIN SODIUM 40 MG: 100 INJECTION SUBCUTANEOUS at 09:36

## 2019-05-09 RX ADMIN — CEPHALEXIN 500 MG: 500 CAPSULE ORAL at 06:09

## 2019-05-09 RX ADMIN — INSULIN GLARGINE 24 UNITS: 100 INJECTION, SOLUTION SUBCUTANEOUS at 09:20

## 2019-05-09 RX ADMIN — MULTIPLE VITAMINS W/ MINERALS TAB 1 TABLET: TAB at 09:20

## 2019-05-09 RX ADMIN — ASPIRIN 81 MG 81 MG: 81 TABLET ORAL at 09:20

## 2019-05-09 RX ADMIN — METOPROLOL TARTRATE 25 MG: 25 TABLET ORAL at 09:33

## 2019-05-09 RX ADMIN — DOCUSATE SODIUM 100 MG: 100 CAPSULE, LIQUID FILLED ORAL at 09:19

## 2019-05-09 RX ADMIN — FAMOTIDINE 20 MG: 20 TABLET ORAL at 09:20

## 2019-05-09 RX ADMIN — OXYCODONE HYDROCHLORIDE 5 MG: 5 TABLET ORAL at 09:31

## 2019-05-09 RX ADMIN — CEPHALEXIN 500 MG: 500 CAPSULE ORAL at 11:58

## 2019-05-09 RX ADMIN — POLYETHYLENE GLYCOL 3350 17 G: 17 POWDER, FOR SOLUTION ORAL at 09:21

## 2019-05-09 RX ADMIN — LISINOPRIL 2.5 MG: 5 TABLET ORAL at 09:33

## 2019-05-09 RX ADMIN — DOXYCYCLINE 100 MG: 100 CAPSULE ORAL at 09:20

## 2019-05-09 RX ADMIN — INSULIN LISPRO 2 UNITS: 100 INJECTION, SOLUTION INTRAVENOUS; SUBCUTANEOUS at 11:58

## 2019-05-09 RX ADMIN — FUROSEMIDE 20 MG: 20 TABLET ORAL at 09:20

## 2019-05-09 RX ADMIN — CLOPIDOGREL BISULFATE 75 MG: 75 TABLET ORAL at 09:19

## 2019-05-09 ASSESSMENT — PAIN DESCRIPTION - LOCATION
LOCATION: LEG
LOCATION: LEG

## 2019-05-09 ASSESSMENT — PAIN DESCRIPTION - ORIENTATION: ORIENTATION: RIGHT

## 2019-05-09 ASSESSMENT — PAIN DESCRIPTION - PAIN TYPE: TYPE: ACUTE PAIN

## 2019-05-09 ASSESSMENT — PAIN DESCRIPTION - DESCRIPTORS: DESCRIPTORS: ACHING

## 2019-05-09 ASSESSMENT — PAIN DESCRIPTION - FREQUENCY: FREQUENCY: INTERMITTENT

## 2019-05-09 ASSESSMENT — PAIN SCALES - GENERAL
PAINLEVEL_OUTOF10: 3
PAINLEVEL_OUTOF10: 5

## 2019-05-09 ASSESSMENT — PAIN DESCRIPTION - ONSET: ONSET: ON-GOING

## 2019-05-09 ASSESSMENT — PAIN DESCRIPTION - PROGRESSION: CLINICAL_PROGRESSION: NOT CHANGED

## 2019-05-09 NOTE — PROGRESS NOTES
[facility]: NAM[facility]LL   ACUTE REHABILITATION  LUNCH GROUP NOTE    Date: 19  Patient Name: Ralph Duncan      Room: 3358/7783-99        MRN: [id number]    : 1965  (46 y.o.)  Gender: male   Referring Practitioner: Dr. Henry Grier  Diagnosis: Cardiac Arrest and stroke    The Patient participated in the OT Meal Program at  in the [address] [place] [address] on this date. They were in group for 45 minutes     The patient  Spoke only when spoken to. Pt had good conversation once initiated. They expressed  enjoyment in the setting and people. The patient's pain was monitored as they were  able to tolerate Lunch Group. Pain level was 0/10. They were Modified Independent to feed themselves.    FIM Score: Eatin - Feeds self with adaptive equipment/dentures and/or feeds self with modified diet and/or performs own tube feeding     19 1547   OT Group Minutes   Time In 1105   Time Out [facility]

## 2019-05-09 NOTE — FLOWSHEET NOTE
Spoke with office personal at Dr. Jackie Smiley office re: rescheduling post-op appt. RN informed that Pt has a f/u appt scheduled for Monday 10:30am. Pt notified.  Electronically signed by Ju Macdonald RN on 5/9/2019 at 1:29 PM

## 2019-05-09 NOTE — FLOWSHEET NOTE
05/09/19 Lee Given Transfer Handoff   Oncoming Nurse/Offgoing Nurse Kathy/Kaylyn  (Nurse at [facility])   Handoff Communication Telephone   Time Handoff Given 1550   End of Shift Check Performed Yes     Handoff called to nurse at [facility]. D/c paperwork reviewed with Pt. Prescription for pain med sent with Pt. Pt d/c with dad via w/c and all belongings sent.  Electronically signed by Winsome Quinonez RN on 5/9/2019 at 4:06 PM

## 2019-05-09 NOTE — PROGRESS NOTES
Physical Medicine & Rehabilitation  Progress Note    5/9/2019 10:29 AM     CC: Ambulatory and ADL dysfunction due to cardiac arrest, CABG, CVA, infected right lower extremity hardware, metabolic encephalopathy and respiratory failure    Subjective:   No new complaints today. Continues to have significant difficulty with motor function of left foot. Continues to have minor pain at incision sight. BM+ today. ROS:  Denies fevers, chills, sweats. No chest pain, palpitations, lightheadedness. Denies coughing, wheezing or shortness of breath. Denies abdominal pain, nausea, diarrhea or constipation. No new areas of joint pain. Denies new areas of numbness or weakness. Denies new anxiety or depression issues. No new skin problems. Rehabilitation:   PT:  Restrictions/Precautions: Cardiac, General Precautions, Surgical Protocols, Fall Risk(telesitter)  Sternal Precautions: Emergent CABG X4  Other position/activity restrictions: Fall Risk  Required Braces or Orthoses  Other: Heart Hugger Brace  Right Lower Extremity Brace: Ankle Foot Orthotics  RLE Brace Type: Profo boot and AFO  Left Lower Extremity Brace: Boot  LLE Brace Type: Profo boot   Transfers  Sit to Stand: Stand by assistance  Stand to sit: Stand by assistance  Bed to Chair: Stand by assistance  Stand Pivot Transfers: Stand by assistance  Comment: Cues for hand placement 50% of the time with transfers  Ambulation 1  Surface: level tile  Device: Rolling Walker  Other Apparatus: Right, Dorsiflex Assist  Assistance: Stand by assistance  Quality of Gait: High steppage on right LE to clear floor with right foot drop  Distance: 200 ft  Comments: Patient continues with high steppage gait pattern without dorsi assist (foot up). Has an AFO but unable to use at this time due to open wounds on back of right calf.     Transfers  Sit to Stand: Stand by assistance  Stand to sit: Stand by assistance  Bed to Chair: Stand by assistance  Stand Pivot Transfers: Stand by assistance  Comment: Cues for hand placement 50% of the time with transfers  Ambulation  Ambulation?: Yes  More Ambulation?: Yes  Ambulation 1  Surface: level tile  Device: Rolling Walker  Other Apparatus: Right, Dorsiflex Assist  Assistance: Stand by assistance  Quality of Gait: High steppage on right LE to clear floor with right foot drop  Distance: 200 ft  Comments: Patient continues with high steppage gait pattern without dorsi assist (foot up). Has an AFO but unable to use at this time due to open wounds on back of right calf. Surface: level tile  Ambulation 1  Surface: level tile  Device: Rolling Walker  Other Apparatus: Right, Dorsiflex Assist  Assistance: Stand by assistance  Quality of Gait: High steppage on right LE to clear floor with right foot drop  Distance: 200 ft  Comments: Patient continues with high steppage gait pattern without dorsi assist (foot up). Has an AFO but unable to use at this time due to open wounds on back of right calf. OT:  ADL  Additional Comments: Pt utilizes shower bench and LHS for bathing this date. Instrumental ADL's  Instrumental ADLs: Yes     Balance  Sitting Balance: Supervision  Standing Balance: Stand by assistance   Standing Balance  Time: 1-2 min x 3   Activity: mobility in room, toileting/grooming tasks. Comment: no unsteadiness noted  Functional Mobility  Functional - Mobility Device: Rolling Walker  Activity: To/from bathroom  Assist Level: Stand by assistance  Functional Mobility Comments: imporved safety noted.       Bed mobility  Bridging: Stand by assistance(Pt c/o chest pain; exercise aborted after 2 reps)  Rolling to Left: Modified independent  Rolling to Right: Stand by assistance  Supine to Sit: Modified independent(head of bed up )  Sit to Supine: Stand by assistance  Scooting: Supervision  Comment: VC for proper technique for bed mobility required  Transfers  Stand Step Transfers: Modified independent(using r/w )  Stand Pivot Transfers: Minimal assistance, Moderate assistance  Sit to stand: Modified independent  Stand to sit: Modified independent  Transfer Comments: pt c improved awareness of hand placement, able to identify and correct ind. Toilet Transfers  Toilet - Technique: Ambulating  Equipment Used: Raised toilet seat with rails  Toilet Transfer: Stand by assistance     Shower Transfers  Shower - Transfer From: [facility] [place] - Transfer Type: To and From  Shower - Transfer To: Transfer tub bench  Shower - Technique: Ambulating  Shower Transfers: Stand by assistance       OT:   FIM ITEMS  SELF-CARE  Eatin - Feeds self with adaptive equipment/dentures and/or feeds self with modified diet and/or performs own tube feeding  GOAL: Eatin  Groomin - Patient independent with all grooming tasks  GOAL: Groomin  Bathin - Able to bathe all 10 areas with device(long handled sponge )  GOAL: Bathin  Dressing-Upper: 7 - Patient independently dresses upper body  GOAL: Dressing-Upper: 6  Dressing-Lower: 6 - Independent with device/prosthesis  GOAL: Dressing-Lower: 6  Toiletin - Requires device (grab bar/walker/etc.)  GOAL: Toiletin    ST:  Problem Solving/Reasoning: planning projects puzzle- min A. Follow written directions- 100%     Objective:  /74   Pulse 90   Temp 97.9 °F (36.6 °C) (Oral)   Resp 16   Ht 5' 8.11\" (1.73 m)   Wt 147 lb 9.6 oz (67 kg)   SpO2 97%   BMI 22.37 kg/m²  I Body mass index is 22.37 kg/m². I   Wt Readings from Last 1 Encounters:   19 147 lb 9.6 oz (67 kg)      Temp (24hrs), Av °F (36.7 °C), Min:97.9 °F (36.6 °C), Max:98.1 °F (36.7 °C)         GEN: well developed, well nourished, no acute distress  HEENT: Normocephalic atraumatic, EOMI, mucous membranes pink and moist  CV: RRR, no murmurs, rubs or gallops  PULM: CTAB, no rales or rhonchi. Respirations WNL and unlabored  ABD: soft, NT, ND, +BS and equal  NEURO: A&O x3. Sensation intact to light touch.    MSK: 4/5 bilateral upper input(s): AST, ALT, ALB, BILIDIR, BILITOT, ALKPHOS in the last 72 hours. I/O (24Hr): Intake/Output Summary (Last 24 hours) at 5/9/2019 1029  Last data filed at 5/9/2019 0527  Gross per 24 hour   Intake 2266 ml   Output 1925 ml   Net 341 ml       Glu last 24 hour  Recent Labs     05/08/19  0650 05/08/19  1559 05/08/19  1957 05/09/19  0656   POCGLU 95 110 152* 92       No results for input(s): CLARITYU, COLORU, PHUR, SPECGRAV, PROTEINU, RBCUA, BLOODU, BACTERIA, NITRU, WBCUA, LEUKOCYTESUR, YEAST, Srinivas Cuff in the last 72 hours. Impression/Plan:    Patient is a 58-year-old male with ADL and mobility dysfunction secondary to cardiac arrest, CABG, CVA, infected right lower extremity hardware, metabolic encephalopathy and respiratory failure     1. Continue acute inpatient rehab to work on improving functional deficits. Discharge plan 5/8/2019- awaiting insurance approval for skilled nursing facility. Lives in a group home. 2. CV-cardiac arrest CAD, myopathy, CABG ×4 on 4/7/2019. Appreciate cardiology follow-up. 3. Pulmonary-stable. 4. ID-history of chronic osteomyelitis of the leg with hardware removal on 4/22/2019. Residual right foot drop. Weightbearing as tolerated. On doxycycline 60 days for chronic suppression with leukocytosis which is improving. Keflex until 5/10/2019. Continue dressing changes/wound care. 5. Endocrine-history of diabetes on insulin sliding scale glucose as above. Continue monitoring. 6. Neurology- patient to follow-up with Dr. Atilio Valdez on 5/21/2019 for decision on stenting patient on aspirin and Plavix and Lipitor. 7. Pain- stable continue present management. 8. Wounds-multiple wounds plaque and he'll PRAFO bilateral lower extremities to elevate heels off bed. Patient on Doxy and Keflex. 9. DVT prophylaxis Lovenox.   10. Internal medicine and infectious disease following.             Pantera Leonard MD       This note is created with the assistance of a speech recognition program.  While intending to generate a document that actually reflects the content of the visit, the document can still have some errors including those of syntax and sound a like substitutions which may escape proof reading.   In such instances, actual meaning can be extrapolated by contextual diversion

## 2019-05-09 NOTE — DISCHARGE SUMMARY
Physical Medicine & Rehabilitation  Discharge Summary     Patient Identification:  Jimbo Green  : 1965  Admit date: 2019  Discharge date: 19   Attending provider: Sera Theodore MD        Primary care provider: No primary care provider on file. Discharge Diagnoses:   Ambulatory and ADL dysfunction due to cardiac arrest, CABG, CVA, infected right lower extremity hardware, metabolic encephalopathy and respiratory failure        Discharge Functional Status:    Physical therapy:  Bed Mobility: Rolling: Stand by assistance, Rolling Left, Rolling Right  Supine to Sit: Supervision  Sit to Supine: Supervision  Scooting: Supervision  Transfers: Sit to Stand: Stand by assistance  Stand to sit: Stand by assistance  Bed to Chair: Stand by assistance  Stand Pivot Transfers: Stand by assistance, Ambulation 1  Surface: level tile  Device: Rolling Walker  Other Apparatus: Right, Dorsiflex Assist  Assistance: Stand by assistance  Quality of Gait: High steppage on right LE to clear floor with right foot drop  Distance: 200 ft  Comments: Patient continues with high steppage gait pattern without dorsi assist (foot up).   Has an AFO but unable to use at this time due to open wounds on back of right calf., Stairs  # Steps : 18  Stairs Height: 8\"  Rails: Right ascending  Device: No Device  Other Apparatus: Right, Dorsiflexon assist  Assistance: Stand by assistance  Comment: Step to gait pattern   Mobility: Bed, Chair, Wheel Chair: 5 - Requires setup/supervision/cues  Walk: 5 - Supervision Requires standby supervision or cuing to walk at least 150 feet  Distance Walked: 200 ft  Wheel Chair: 0 - Activity Not Assessed/Does Not Occur  Distance Traveled in Wheel Chair: 0  Stairs: 5- Supervision Requires supervision(e.g., standing by, cuing, or coaxing) to go up and down one flight of stairs, PT Equipment Recommendations  Equipment Needed: No  Other: SNF will provide, Assessment: Pt admitted to [address] after stay at [place] [address] for cardiac arrest. Pt with sternal precautions and FWB. Pt able to perform bed mobility SBA except sup to sit min A due to surgical pain. Pt completed transfers and gait min A x1 with use of RW. sao2 ranged from 92-94% during activity and amb on room air. verbal cues were needed for hand placement during transfers for pt. Pt with more carryover for hand placement P.M session compared to A.M session. Pt tolerated P.M therapy well. Occupational therapy: Eatin - Feeds self with adaptive equipment/dentures and/or feeds self with modified diet and/or performs own tube feeding  Groomin - Patient independent with all grooming tasks  Bathin - Able to bathe all 10 areas with device(long handled sponge )  Dressing-Upper: 7 - Patient independently dresses upper body  Dressing-Lower: 6 - Independent with device/prosthesis  Toiletin - Requires device (grab bar/walker/etc.)  Toilet Transfer: 6 - Independent with device (grab bar/walker/slide bar)  Tub Transfer: 0 - Activity does not occur  Shower Transfer: 6 - Modified independence, Equipment Recommendations  Equipment Needed: (TBD), Assessment: Pt completed balloon toss seated, remaining within sternal precautions during activtiy      Speech therapy:  Comprehension: 6 - Complex ideas 90% or device (hearing aid/glasses)  Expression: 6 - Device used to express complex ideas/needs  Social Interaction: 6 - Patient requires medication for mood and/or effect  Problem Solvin - Independent with device (e.g. notes, schedules)  Memory: 6 - Patient requires device to recall (e.g. memory book)      Inpatient Rehabilitation Course:   Jonny Mullen is a 46 y.o. male admitted to inpatient rehabilitation on 2019 for rehab forAmbulatory and ADL dysfunction due to cardiac arrest, CABG, CVA, infected right lower extremity hardware, metabolic encephalopathy and respiratory failure    Rehab course:      The patient was admitted from [address] [place] [address] after cardiac arrest.  He was found to have PEA arrest and underwent a cardiac cath revealing multivessel CAD and severe LV dysfunction and severe hypokinesis. He underwent a CABG ×4 on 4/8/2019. He had an old history of right lower extremity orthopedic procedure and had a exposed screw removed on 4/7/2019 by Dr. Ruthy Lozano. He was [facility] to [facility] rehab unit on 4/26/2019. He was followed by infectious disease for right calf cellulitis and chronic osteomyelitis of the right tibia with infected hardware. Patient is to continue on doxycycline for chronic suppression. He participated in aggressive physical therapy. He is requiring discharge to skilled nursing facility for more therapy as he lives alone. .  The patient participated in an aggressive multidisciplinary inpatient rehabilitation program involving 3 hours per day, 5 days per week of rehabilitation. Patient benefited from inpatient rehab and was discharged in good stable condition. Consults:   cardiology, ID and internal medicine    Significant Diagnostics:   CBC:   Lab Results   Component Value Date    WBC 9.8 05/08/2019    RBC 3.75 05/08/2019    HGB 11.1 05/08/2019    HCT 34.3 05/08/2019    MCV 91.5 05/08/2019    MCH 29.6 05/08/2019    MCHC 32.3 05/08/2019    RDW 14.9 05/08/2019     05/08/2019    MPV 7.9 05/08/2019     BMP:    Lab Results   Component Value Date     05/04/2019    K 4.4 05/04/2019    CL 99 05/04/2019    CO2 27 05/04/2019    BUN 16 05/04/2019    LABALBU 3.1 05/04/2019    CREATININE 0.48 05/04/2019    CALCIUM 9.0 05/04/2019    GFRAA >60 05/04/2019    LABGLOM >60 05/04/2019    GLUCOSE 89 05/04/2019         Patient Instructions: The patient will be discharged to a Skilled nursing facility from here.     Medications, precautions and follow up reviewed with patient and family    Follow-up visits: See after visit summary from hospitalization    Discharge Medications:   Chacho Keith   Home Medication Instructions U:[id number]    Printed on:05/09/19 6857   Medication Information                      aspirin 81 MG chewable tablet  Take 1 tablet by mouth daily             atorvastatin (LIPITOR) 40 MG tablet  Take 1 tablet by mouth nightly             bisacodyl (DULCOLAX) 10 MG suppository  Place 1 suppository rectally daily as needed for Constipation             cephALEXin (KEFLEX) 500 MG capsule  Take 1 capsule by mouth 4 times daily for 2 days             clopidogrel (PLAVIX) 75 MG tablet  Take 1 tablet by mouth daily             docusate sodium (COLACE, DULCOLAX) 100 MG CAPS  Take 100 mg by mouth 2 times daily             doxycycline monohydrate (MONODOX) 100 MG capsule  Take 1 capsule by mouth every 12 hours             enoxaparin (LOVENOX) 40 MG/0.4ML injection  Inject 0.4 mLs into the skin daily             famotidine (PEPCID) 20 MG tablet  Take 1 tablet by mouth 2 times daily             furosemide (LASIX) 20 MG tablet  Take 1 tablet by mouth daily             insulin glargine (LANTUS) 100 UNIT/ML injection vial  Inject 24 Units into the skin daily             insulin lispro (HUMALOG) 100 UNIT/ML injection vial  Inject 0-12 Units into the skin 3 times daily (with meals)             insulin lispro (HUMALOG) 100 UNIT/ML injection vial  Inject 0-6 Units into the skin nightly             lisinopril (PRINIVIL;ZESTRIL) 2.5 MG tablet  Take 1 tablet by mouth daily             metoprolol tartrate (LOPRESSOR) 25 MG tablet  Take 1 tablet by mouth 2 times daily             Multiple Vitamins-Minerals (THERAPEUTIC MULTIVITAMIN-MINERALS) tablet  Take 1 tablet by mouth daily (with breakfast)             oxyCODONE (ROXICODONE) 5 MG immediate release tablet  Take 1 tablet by mouth 2 times daily as needed for Pain for up to 3 days.              traZODone (DESYREL) 50 MG tablet  Take 1 tablet by mouth nightly                 Ana Weinstein MD

## 2019-05-09 NOTE — DISCHARGE INSTR - DIET

## 2019-05-10 NOTE — PROGRESS NOTES
°C)    Recent Labs     05/08/19  1559 05/08/19  1957 05/09/19  0656 05/09/19  1052   POCGLU 110 152* 92 157*       I/O (24Hr): Intake/Output Summary (Last 24 hours) at 5/9/2019 2121  Last data filed at 5/9/2019 0999  Gross per 24 hour   Intake 480 ml   Output 1525 ml   Net -1045 ml       Labs:    [unfilled]    Lab Results   Component Value Date/Time    SPECIAL NOT REPORTED 04/22/2019 04:08 PM     Lab Results   Component Value Date/Time    CULTURE (A) 04/22/2019 04:08 PM     STAPHYLOCOCCUS SPECIES, COAGULASE NEGATIVE SCANT GROWTH Susceptibilities have not been performed. Notify the laboratory within 7 days for further workup if clinically indicated. CULTURE NO ANAEROBIC ORGANISMS ISOLATED AT 5 DAYS (A) 04/22/2019 04:08 PM       [unfilled]    Radiology:      Physical Examination:        General appearance:  alert, cooperative and no distress  Mental Status:  oriented to person, place and time and normal affect  Lungs:  clear to auscultation bilaterally, normal effort  Heart:  regular rate and rhythm, no murmur  Abdomen:  soft, nontender, nondistended, normal bowel sounds, no masses, hepatomegaly, splenomegaly  Extremities: Dressing present and right leg , Splint present   Skin:  no gross lesions, rashes, induration    Assessment:        Primary Problem  <principal problem not specified>    Active Hospital Problems    Diagnosis Date Noted    Cellulitis of right lower extremity [I50.696]     Moderate malnutrition ([address] [E44.0] 04/26/2019    Acute CVA (cerebrovascular accident) ([address] [I63.9] 04/25/2019    Acute cerebral infarction associated with systemic hypoxia or ischemia [I63.89]     Subacute osteomyelitis of right tibia ([address] [R90.419]        Plan:        1. Cardiac arrest, status post CABG with multivessel coronary artery disease  2. Acute stroke. Speech difficulties, patient is on aspirin, Lipitor, Plavix  3.  Congestive heart failure, both systolic and diastolic, ejection fraction is 40%, patient is on Lasix, Aldactone, lisinopril, Lopressor  4. Infected implant removed from right leg, she was on doxycycline  5. Diabetes, controlled, on sliding scale and long-acting insulin  6.  On DVT prophylaxis  7.     4/28/19  Doing better   BP controlled   Pain Better   Advising Carb Control diet     4/29   Has Readings of Low Blood Pressure   Hyponatremia   Seen By cardiologist   Diuretics and Antihypertensives on Hold   High Blood sugars   Not Compliant with Diet   Lantus to 35     4/30   Patient blood sugar is better  Started on Lopressor, will resume Lasix from tomorrow, blood pressure is better controlled  Wound on right leg is healthy    5/1  Patient doing better,  Liver enzymes are  improvement  Blood sugars better controlled  5/2  BP Better Controlled   Blood sugars Controlled   5/3  Patient BP is Controlled   DM - Controlled   Added Keflex per ID   5/5  Readings of Low Blood sugars   Reducing dose of Lantus by 3 units    5/6     bs low at times   need to regulate diet   bp controlled     Cont po keflex     To see ortho for knee    Carotid stenosis    to f/u with vascular    d/c plan for 5/8 to ecf     bp controlled   off bp meds      5/7   doing well bs controlled   bp controlled   dkd rt knee   f/u ortho   onk to d/c in am  ecf     5/8     stable   rt knee incision infection  Cont po atb      bp controlled   no fever    bs beter hypoglycemia resolved      5/9   stable   cont same    ok to d/c   Cathy Ramirez MD  5/9/2019  9:21 PM

## 2019-05-13 ENCOUNTER — OFFICE VISIT (OUTPATIENT)
Dept: CARDIOTHORACIC SURGERY | Age: 54
End: 2019-05-13

## 2019-05-13 ENCOUNTER — TELEPHONE (OUTPATIENT)
Dept: NEUROLOGY | Age: 54
End: 2019-05-13

## 2019-05-13 ENCOUNTER — HOSPITAL ENCOUNTER (OUTPATIENT)
Dept: GENERAL RADIOLOGY | Age: 54
Discharge: HOME | End: 2019-05-15
Payer: MEDICAID

## 2019-05-13 ENCOUNTER — HOSPITAL ENCOUNTER (OUTPATIENT)
Age: 54
Discharge: HOME | End: 2019-05-15
Payer: MEDICAID

## 2019-05-13 ENCOUNTER — HOSPITAL ENCOUNTER (OUTPATIENT)
Age: 54
Setting detail: SPECIMEN
Discharge: HOME | End: 2019-05-13
Payer: MEDICAID

## 2019-05-13 VITALS
HEIGHT: 65 IN | DIASTOLIC BLOOD PRESSURE: 80 MMHG | TEMPERATURE: 98.2 F | SYSTOLIC BLOOD PRESSURE: 133 MMHG | HEART RATE: 97 BPM | WEIGHT: 145 LBS | BODY MASS INDEX: 24.16 KG/M2 | OXYGEN SATURATION: 99 %

## 2019-05-13 DIAGNOSIS — Z95.1 S/P CABG X 4: Primary | ICD-10-CM

## 2019-05-13 DIAGNOSIS — Z95.1 S/P CABG X 4: ICD-10-CM

## 2019-05-13 LAB
ANION GAP SERPL CALCULATED.3IONS-SCNC: 11 MMOL/L (ref 9–17)
BUN BLDV-MCNC: 15 MG/DL (ref 6–20)
BUN/CREAT BLD: ABNORMAL (ref 9–20)
CALCIUM SERPL-MCNC: 9.1 MG/DL (ref 8.6–10.4)
CHLORIDE BLD-SCNC: 99 MMOL/L (ref 98–107)
CO2: 27 MMOL/L (ref 20–31)
CREAT SERPL-MCNC: 0.46 MG/DL (ref 0.7–1.2)
GFR AFRICAN AMERICAN: >60 ML/MIN
GFR NON-AFRICAN AMERICAN: >60 ML/MIN
GFR SERPL CREATININE-BSD FRML MDRD: ABNORMAL ML/MIN/{1.73_M2}
GFR SERPL CREATININE-BSD FRML MDRD: ABNORMAL ML/MIN/{1.73_M2}
GLUCOSE BLD-MCNC: 91 MG/DL (ref 70–99)
HCT VFR BLD CALC: 39.6 % (ref 40.7–50.3)
HEMOGLOBIN: 12.1 G/DL (ref 13–17)
MCH RBC QN AUTO: 29.2 PG (ref 25.2–33.5)
MCHC RBC AUTO-ENTMCNC: 30.6 G/DL (ref 28.4–34.8)
MCV RBC AUTO: 95.4 FL (ref 82.6–102.9)
NRBC AUTOMATED: 0 PER 100 WBC
PDW BLD-RTO: 14.6 % (ref 11.8–14.4)
PLATELET # BLD: 279 K/UL (ref 138–453)
PMV BLD AUTO: 10 FL (ref 8.1–13.5)
POTASSIUM SERPL-SCNC: 4.1 MMOL/L (ref 3.7–5.3)
RBC # BLD: 4.15 M/UL (ref 4.21–5.77)
SODIUM BLD-SCNC: 137 MMOL/L (ref 135–144)
WBC # BLD: 8 K/UL (ref 3.5–11.3)

## 2019-05-13 PROCEDURE — 80048 BASIC METABOLIC PNL TOTAL CA: CPT

## 2019-05-13 PROCEDURE — APPSS15 APP SPLIT SHARED TIME 0-15 MINUTES: Performed by: NURSE PRACTITIONER

## 2019-05-13 PROCEDURE — 71046 X-RAY EXAM CHEST 2 VIEWS: CPT

## 2019-05-13 PROCEDURE — 36415 COLL VENOUS BLD VENIPUNCTURE: CPT

## 2019-05-13 PROCEDURE — 85027 COMPLETE CBC AUTOMATED: CPT

## 2019-05-13 PROCEDURE — P9603 ONE-WAY ALLOW PRORATED MILES: HCPCS

## 2019-05-13 PROCEDURE — 99024 POSTOP FOLLOW-UP VISIT: CPT | Performed by: NURSE PRACTITIONER

## 2019-05-13 NOTE — PROGRESS NOTES
tablet by mouth nightly, Disp: 30 tablet, Rfl: 3    lisinopril (PRINIVIL;ZESTRIL) 2.5 MG tablet, Take 1 tablet by mouth daily, Disp: 30 tablet, Rfl: 3    metoprolol tartrate (LOPRESSOR) 25 MG tablet, Take 1 tablet by mouth 2 times daily, Disp: 60 tablet, Rfl: 3    furosemide (LASIX) 20 MG tablet, Take 1 tablet by mouth daily, Disp: 60 tablet, Rfl: 3    bisacodyl (DULCOLAX) 10 MG suppository, Place 1 suppository rectally daily as needed for Constipation, Disp: , Rfl:     docusate sodium (COLACE, DULCOLAX) 100 MG CAPS, Take 100 mg by mouth 2 times daily, Disp: , Rfl:     clopidogrel (PLAVIX) 75 MG tablet, Take 1 tablet by mouth daily, Disp: 30 tablet, Rfl: 3    doxycycline monohydrate (MONODOX) 100 MG capsule, Take 1 capsule by mouth every 12 hours, Disp: , Rfl:     famotidine (PEPCID) 20 MG tablet, Take 1 tablet by mouth 2 times daily, Disp: 60 tablet, Rfl: 3    enoxaparin (LOVENOX) 40 MG/0.4ML injection, Inject 0.4 mLs into the skin daily, Disp: , Rfl: 3    Multiple Vitamins-Minerals (THERAPEUTIC MULTIVITAMIN-MINERALS) tablet, Take 1 tablet by mouth daily (with breakfast), Disp: , Rfl:     Past Surgical History:   Procedure Laterality Date    CARDIAC CATHETERIZATION  04/07/2019    MVD-Stv    CORONARY ARTERY BYPASS GRAFT  04/07/2019    x4 per Dr. Penelope Siddiqui, [facility] N/A 4/7/2019    EMERGENT CPR,  CORONARY ARTERY BYPASS X4, ON PUMP ,CHEST LEFT OPEN; SWAN, ANGELA PER ANESTHESIA performed by Samanta Loaiza MD at [facility] Right    [address] [place] [address]      unsure if correct known tibial surgery    HARDWARE REMOVAL Right 04/07/2019    R Knee internal screw protruding through skin    HARDWARE REMOVAL Right 04/22/2019    HARDWARE REMOVAL RIGHT TIBIA,    RECONSTRUCTIVE REPAIR STERNAL N/A 4/8/2019    STERNAL  WOUND WASHOUT AND CLOSURE S/P CABG performed by Samanta Loaiza MD at [address] [place] [address] N/A 4/22/2019 HARDWARE REMOVAL RIGHT TIBIA, IRRIGATION AND DEBRIDEMENT RIGH TIBIA performed by Jen Goldstein MD at [address] Right     Livia Augusta off roof, external fixation and internal hardware    TONSILLECTOMY         Social Hx: reports that he has quit smoking. He has never used smokeless tobacco.    Assessment & Plan:   1. Incisions are healing well. Keep open to air. Follow up with ortho for right knee sutures. 2. Continue current medications. 3. Follow up with cardiology (Dr. Piper Greene), and PCP as instructed at hospital discharge  4. Follow up with Dr. Marquise Connolly, as needed. The above recommendations including medications and orders were discussed and agreed upon with Dr. Marquise Connolly.     Nyasia Jeffries CNP

## 2019-05-15 NOTE — PROGRESS NOTES
Speech Language Pathology  Speech Language Pathology  [facility]    Cognitive Treatment Note    Date: 4/30/2019  Patients Name: Channing De La Cruz  MRN: [id number]  Diagnosis:   Patient Active Problem List   Diagnosis Code    PAD (peripheral artery disease) ([facility] I73.9    Subacute osteomyelitis of right tibia ([facility] J76.197    Metabolic encephalopathy X76.95    Abnormal CT of the head R93.0    Hepatitis K75.9    Acute cerebral infarction associated with systemic hypoxia or ischemia I63.89    Multi infarct state I69.30    Carotid stenosis, bilateral I65.23    Right foot drop M21.371    Thrombocytosis (HCC) D47.3    Acute CVA (cerebrovascular accident) ([facility] I63.9    Moderate malnutrition ([facility] E44.0       Pain: 0/10    Cognitive Treatment    Treatment time: 7263-9925      Subjective: [x] Alert [x] Cooperative     [] Confused     [] Agitated    [] Lethargic      Objective/Assessment:  Attention: Pt. Tangential, needed cues to redirect back to task. Recall: short paragraph recall- 80%, 4 word category inclusion- 80%, 90% c cues. Organization: n/a    Problem Solving/Reasoning: opposittes, correct sentence inconsistencies- 70%, 90% c cues. Other:     Plan:  [x] Continue ST services    [] Discharge from ST:      Discharge recommendations: [] Inpatient Rehab   [] [place]   [] Outpatient Therapy  [] Follow up at trauma clinic   [] Other:       Treatment completed by: Kinsey LAYCCC/SLP no chest pain and no edema.

## 2019-05-20 ENCOUNTER — OFFICE VISIT (OUTPATIENT)
Dept: NEUROLOGY | Age: 54
End: 2019-05-20
Payer: MEDICAID

## 2019-05-20 VITALS
WEIGHT: 142 LBS | DIASTOLIC BLOOD PRESSURE: 73 MMHG | HEART RATE: 76 BPM | BODY MASS INDEX: 23.63 KG/M2 | SYSTOLIC BLOOD PRESSURE: 111 MMHG

## 2019-05-20 DIAGNOSIS — I69.30 MULTI INFARCT STATE: ICD-10-CM

## 2019-05-20 DIAGNOSIS — I63.89: ICD-10-CM

## 2019-05-20 DIAGNOSIS — I63.9 ACUTE CVA (CEREBROVASCULAR ACCIDENT) (HCC): Primary | ICD-10-CM

## 2019-05-20 DIAGNOSIS — I65.23 CAROTID STENOSIS, BILATERAL: ICD-10-CM

## 2019-05-20 PROCEDURE — G8427 DOCREV CUR MEDS BY ELIG CLIN: HCPCS | Performed by: PSYCHIATRY & NEUROLOGY

## 2019-05-20 PROCEDURE — G8420 CALC BMI NORM PARAMETERS: HCPCS | Performed by: PSYCHIATRY & NEUROLOGY

## 2019-05-20 PROCEDURE — G8598 ASA/ANTIPLAT THER USED: HCPCS | Performed by: PSYCHIATRY & NEUROLOGY

## 2019-05-20 PROCEDURE — 1036F TOBACCO NON-USER: CPT | Performed by: PSYCHIATRY & NEUROLOGY

## 2019-05-20 PROCEDURE — 99215 OFFICE O/P EST HI 40 MIN: CPT | Performed by: PSYCHIATRY & NEUROLOGY

## 2019-05-20 PROCEDURE — 1111F DSCHRG MED/CURRENT MED MERGE: CPT | Performed by: PSYCHIATRY & NEUROLOGY

## 2019-05-20 PROCEDURE — 3017F COLORECTAL CA SCREEN DOC REV: CPT | Performed by: PSYCHIATRY & NEUROLOGY

## 2019-05-20 RX ORDER — LISINOPRIL 2.5 MG/1
2.5 TABLET ORAL DAILY
COMMUNITY
End: 2019-06-19 | Stop reason: SDUPTHER

## 2019-05-20 RX ORDER — OXYCODONE HYDROCHLORIDE 15 MG/1
1 TABLET, FILM COATED, EXTENDED RELEASE ORAL EVERY 6 HOURS
COMMUNITY
End: 2019-07-03 | Stop reason: ALTCHOICE

## 2019-05-20 NOTE — PROGRESS NOTES
Neurocritical Care, Stroke & Neurointerventional Note    [place] [place] [address] [place] [address], [facility] [facility], [address] [place] [address]   P: [phone]  Endovascular Neurosurgery Consult    Pt Name: Isis Chand  MRN: [id number]  YOB: 1965  Date of evaluation: 5/20/2019  Primary Care Physician: No primary care provider on file. Reason for evaluation: Left ICA Occlusion and Left VA occlusion with 60% LU stenosis    SUBJECTIVE:   History of Chief Complaint:    Isis Chand is a 46 y.o. male who presents with PEA and CABG in April, 2019 with multiple bilateral scattered strokes with following arterial occlusion; Left ICA occlusion  Left VA occlusion  Right ICA 60%    He also had right leg fracture with ORIF and 6 months ago with subsequent infection    He is doing well with no severe deficit      Allergies  has No Known Allergies. Medications  Prior to Admission medications    Medication Sig Start Date End Date Taking? Authorizing Provider   lisinopril (PRINIVIL;ZESTRIL) 2.5 MG tablet Take 2.5 mg by mouth daily   Yes Historical Provider, MD   oxyCODONE (OXYCONTIN) 15 MG T12A extended release tablet Take 1 tablet by mouth every 6 hours.    Yes Historical Provider, MD   enoxaparin (LOVENOX) 40 MG/0.4ML injection Inject 0.4 mLs into the skin daily 5/10/19  Yes Mariann Pallas, MD   traZODone (DESYREL) 50 MG tablet Take 1 tablet by mouth nightly 5/9/19  Yes Mariann Pallas, MD   aspirin 81 MG chewable tablet Take 1 tablet by mouth daily 5/10/19  Yes Mariann Pallas, MD   insulin glargine (LANTUS) 100 UNIT/ML injection vial Inject 24 Units into the skin daily 5/10/19  Yes Mariann Pallas, MD   insulin lispro (HUMALOG) 100 UNIT/ML injection vial Inject 0-12 Units into the skin 3 times daily (with meals) 5/9/19  Yes Mariann Pallas, MD   insulin lispro (HUMALOG) 100 UNIT/ML injection vial Inject 0-6 Units into the skin nightly 5/9/19  Yes Mariann Pallas, MD   atorvastatin (LIPITOR) 40 MG that he does not drink alcohol. reports that he does not use drugs. Family History  family history is not on file. Review of Systems:  CONSTITUTIONAL:  negative for fevers, chills, fatigue and malaise    EYES:  negative for double vision, blurred vision and photophobia     HEENT:  negative for tinnitus, epistaxis and sore throat    RESPIRATORY:  negative for cough, shortness of breath, wheezing    CARDIOVASCULAR:  negative for chest pain, palpitations, syncope, edema    GASTROINTESTINAL:  negative for nausea, vomiting    GENITOURINARY:  negative for incontinence    MUSCULOSKELETAL:  negative for neck or back pain    NEUROLOGICAL:  Negative for weakness and tingling  negative for headaches and dizziness    PSYCHIATRIC:  negative for anxiety      Review of systems otherwise negative. OBJECTIVE:   Vitals: /73 (Site: Right Upper Arm, Position: Sitting, Cuff Size: Medium Adult)   Pulse 76   Wt 142 lb (64.4 kg)   BMI 23.63 kg/m²   General appearance: Lying in bed, NADHEENT: Head: Normocephalic, no lesions, without obvious abnormality.   Neck: no adenopathy, no carotid bruit, no JVD, supple, symmetrical, trachea midline and thyroid not enlarged, symmetric, no tenderness/mass/nodules  Lungs: clear to auscultation bilaterally  Heart: regular rate and rhythm, S1, S2 normal, no murmur, click, rub or gallop  Abdomen: soft, non-tender; nondistended, bowel sounds normal  Extremities: extremities normal, atraumatic, no cyanosis or edema, right leg brace and infection due to old fracture and infections of the screws    Neurologic: Mental status: Alert and oriented x 3, intact language, attention, knowledge  Speech: No aphasia, slight slurred speech  CN: left NLF flattening, rest of CN 2-12 intact  MOTOR: Moves all 4 extremities, weakness sin the legs since the fall and injury, with right foot drop   SENSORY: symmetric LT and PP   COORDINATION: F to N intact, 4 wheels walker with seat    LABS:   No results for input(s): WBC, HGB, HCT, PLT, NA, K, CL, CO2, BUN, CREATININE, MG, PHOS, CALCIUM, PTT, INR, AST, ALT, BILITOT, BILIDIR, NITRU, COLORU, BACTERIA in the last 72 hours. Invalid input(s): PT, WBCU, RBCU, LEUKOCYTESUA  No results for input(s): ALKPHOS, ALT, AST, BILITOT, BILIDIR, LABALBU, AMYLASE, LIPASE in the last 72 hours. RADIOLOGY:   Images were personally reviewed including:      IMPRESSIONS:   Jennifer Murray is a 46 y.o. male who presents with PEA and CABG in April, 2019 with multiple bilateral scattered strokes with following arterial occlusion; Left ICA occlusion  Left VA occlusion  Right ICA 60%    He also had right leg fracture with ORIF and 6 months ago with subsequent infection    He is doing well with no severe deficit    1. does not have any pertinent problems on file. PLANS:   1. ASA  2. Add plavix one week prior to the procedure  3. Lipitor 40 mg   4. Will start with an angiogram then possible stenting and crossing the chronic occlusion     Consultation Visit Time:  40 minutes  Patient given educational materials - see patient instructions. Discussed use, benefit, and side effects of prescribed medications. Personally reviewed imaging with patients and all questions answered. Pt voiced understanding. Patient agreed with treatment plan. Follow up as directed below. Greater than 50% of the time was for counseling and providing answer to the patient question. The findings and the plan discussed with the patient and all her questions were answered. Thank you very much for your referral, please do not hesitate to contact me with any questions.     Ananya Alford MD Pager: [phone]  Stroke, [facility] Stroke [address]  Pager [phone]  Electronically signed 5/20/2019 at 1:48 PM

## 2019-06-19 ENCOUNTER — OFFICE VISIT (OUTPATIENT)
Dept: FAMILY MEDICINE CLINIC | Age: 54
End: 2019-06-19
Payer: MEDICAID

## 2019-06-19 VITALS
WEIGHT: 143 LBS | BODY MASS INDEX: 23.82 KG/M2 | HEIGHT: 65 IN | SYSTOLIC BLOOD PRESSURE: 132 MMHG | DIASTOLIC BLOOD PRESSURE: 79 MMHG | HEART RATE: 77 BPM | TEMPERATURE: 97.9 F

## 2019-06-19 DIAGNOSIS — Z95.1 S/P CABG (CORONARY ARTERY BYPASS GRAFT): ICD-10-CM

## 2019-06-19 DIAGNOSIS — E11.8 TYPE 2 DIABETES MELLITUS WITH COMPLICATION, WITHOUT LONG-TERM CURRENT USE OF INSULIN (HCC): Primary | ICD-10-CM

## 2019-06-19 DIAGNOSIS — M86.261 SUBACUTE OSTEOMYELITIS OF RIGHT TIBIA (HCC): ICD-10-CM

## 2019-06-19 PROCEDURE — G8427 DOCREV CUR MEDS BY ELIG CLIN: HCPCS | Performed by: STUDENT IN AN ORGANIZED HEALTH CARE EDUCATION/TRAINING PROGRAM

## 2019-06-19 PROCEDURE — 99203 OFFICE O/P NEW LOW 30 MIN: CPT | Performed by: STUDENT IN AN ORGANIZED HEALTH CARE EDUCATION/TRAINING PROGRAM

## 2019-06-19 PROCEDURE — 99211 OFF/OP EST MAY X REQ PHY/QHP: CPT | Performed by: STUDENT IN AN ORGANIZED HEALTH CARE EDUCATION/TRAINING PROGRAM

## 2019-06-19 PROCEDURE — 3046F HEMOGLOBIN A1C LEVEL >9.0%: CPT | Performed by: STUDENT IN AN ORGANIZED HEALTH CARE EDUCATION/TRAINING PROGRAM

## 2019-06-19 PROCEDURE — 3017F COLORECTAL CA SCREEN DOC REV: CPT | Performed by: STUDENT IN AN ORGANIZED HEALTH CARE EDUCATION/TRAINING PROGRAM

## 2019-06-19 PROCEDURE — 2022F DILAT RTA XM EVC RTNOPTHY: CPT | Performed by: STUDENT IN AN ORGANIZED HEALTH CARE EDUCATION/TRAINING PROGRAM

## 2019-06-19 PROCEDURE — 1036F TOBACCO NON-USER: CPT | Performed by: STUDENT IN AN ORGANIZED HEALTH CARE EDUCATION/TRAINING PROGRAM

## 2019-06-19 PROCEDURE — G8598 ASA/ANTIPLAT THER USED: HCPCS | Performed by: STUDENT IN AN ORGANIZED HEALTH CARE EDUCATION/TRAINING PROGRAM

## 2019-06-19 PROCEDURE — G8420 CALC BMI NORM PARAMETERS: HCPCS | Performed by: STUDENT IN AN ORGANIZED HEALTH CARE EDUCATION/TRAINING PROGRAM

## 2019-06-19 RX ORDER — LISINOPRIL 2.5 MG/1
2.5 TABLET ORAL DAILY
Qty: 30 TABLET | Refills: 2 | Status: ON HOLD | OUTPATIENT
Start: 2019-06-19 | End: 2019-07-04 | Stop reason: HOSPADM

## 2019-06-19 RX ORDER — TRAZODONE HYDROCHLORIDE 50 MG/1
50 TABLET ORAL NIGHTLY
Qty: 30 TABLET | Refills: 1 | Status: SHIPPED | OUTPATIENT
Start: 2019-06-19 | End: 2019-11-04 | Stop reason: SDUPTHER

## 2019-06-19 ASSESSMENT — ENCOUNTER SYMPTOMS
COUGH: 0
VOMITING: 0
PHOTOPHOBIA: 0
WHEEZING: 0
SHORTNESS OF BREATH: 0
EYE PAIN: 0
ABDOMINAL PAIN: 0
SORE THROAT: 0
NAUSEA: 0
RHINORRHEA: 0

## 2019-06-19 ASSESSMENT — PATIENT HEALTH QUESTIONNAIRE - PHQ9
SUM OF ALL RESPONSES TO PHQ QUESTIONS 1-9: 0
2. FEELING DOWN, DEPRESSED OR HOPELESS: 0
SUM OF ALL RESPONSES TO PHQ QUESTIONS 1-9: 0
1. LITTLE INTEREST OR PLEASURE IN DOING THINGS: 0
SUM OF ALL RESPONSES TO PHQ9 QUESTIONS 1 & 2: 0

## 2019-06-19 NOTE — PROGRESS NOTES
Subjective:    Karrie Zapata is a 53 y.o. male with  has a past medical history of CAD (coronary artery disease), Cardiac arrest ([facility] [address] Osteolysis, PEA (Pulseless electrical activity) ([address] and PVD (peripheral vascular disease) ([facility] [address] No family history on file. Presented tothe office today for:  Chief Complaint   Patient presents with    New Patient       HPI  Patient is a 53year old male here to establish care. Previous PCP: long time ago 20-25 years ago     PMHx: Nothing aware of before his most recent admission for MI    FMHx:   -Mother: DM, CAD,    -Father: CAD, HTN, stroke at 45years old   -Siblings: Brother- CAD, stroke 4 years ago    Social Hx:   -Drink: none for the past 20 years   -Smoke: Smoked 1.5-2 PPD since 24years old. Quit in [place] ([place])   -Durgs: none    Allergies: NKDA  Medications:       Admitted to [facility]. V's on 4/7/19 after having several days of SOB. Pre-arrival EKg showed possible Afib with anteroseptal infarct. During transport, went into PEA arrest. Underwent Urgent CABG x4 with open chest performed by Dr. Elio Gray. Delayed closure of chest done on 4/8/19. Vascular consult for decreased pulses post op. Was found to have a large prominent screw present on the lateral aspect of his knee with malodorous drainage. XR showed history of tibial plateau fracture. Pt fell off a roof years ago, was non-complaint with ortho doctor at the time. Orthopedic hardware removed of the right tibia. ID brought on for infection/osteomyelitis. Pt continues on Doxycycline for a total of 60 days. Plan for Neuroendo intervention for LU stent. Currently on ASA, Plavix and Lipitor     Pt was in acute rehab and then sent to [facility]. Pt now living with his parents in [place]. 1. ID-history of chronic osteomyelitis of the leg with hardware removal on 4/22/2019.  Residual right foot drop.  Weightbearing as tolerated.  On doxycycline for 60 days total  2.  Endocrine-history of diabetes on insulin sliding scale glucose as above.  Continue monitoring. 3. Pain- Use tylenol PRN      Review of Systems   Constitutional: Negative for chills and fever. HENT: Negative for congestion, rhinorrhea and sore throat. Eyes: Negative for photophobia and pain. Respiratory: Negative for cough, shortness of breath and wheezing. Cardiovascular: Negative for chest pain and palpitations. Gastrointestinal: Negative for abdominal pain, nausea and vomiting. Genitourinary: Negative for frequency and urgency. Musculoskeletal: Negative for arthralgias and myalgias. Neurological: Negative for dizziness, light-headedness and headaches. Objective:    /79 (Site: Right Upper Arm, Position: Sitting, Cuff Size: Medium Adult) Comment: machine  Pulse 77   Temp 97.9 °F (36.6 °C) (Temporal)   Ht 5' 5\" (1.651 m)   Wt 143 lb (64.9 kg)   BMI 23.80 kg/m²    BP Readings from Last 3 Encounters:   06/19/19 132/79   05/20/19 111/73   05/13/19 133/80       Physical Exam   Constitutional: He is oriented to person, place, and time. He appears well-developed. He is cooperative. No distress. Using walker to get around   HENT:   Head: Normocephalic and atraumatic. Eyes: Pupils are equal, round, and reactive to light. EOM are normal.   Neck: Neck supple. No tracheal deviation present. Cardiovascular: Normal rate and regular rhythm. Exam reveals no gallop and no friction rub. No murmur heard. Incision scar form CABG healed, no erythema or signs of infection. Pulmonary/Chest: Effort normal and breath sounds normal. He has no wheezes. He has no rales. Abdominal: Soft. He exhibits no distension and no mass. There is no tenderness. Musculoskeletal: Normal range of motion. He exhibits no tenderness or deformity. Boot on the right leg   Neurological: He is alert and oriented to person, place, and time. Skin: Skin is warm and dry. No rash noted. No erythema.        Lab Results   Component Value Date    WBC 8.0 05/13/2019    HGB 12.1 (L) 05/13/2019    HCT 39.6 (L) 05/13/2019     05/13/2019    CHOL 56 04/17/2019    TRIG 107 04/17/2019    HDL 13 (L) 04/17/2019    ALT 43 (H) 05/04/2019    AST 26 05/04/2019     05/13/2019    K 4.1 05/13/2019    CL 99 05/13/2019    CREATININE 0.46 (L) 05/13/2019    BUN 15 05/13/2019    CO2 27 05/13/2019    INR 1.2 04/25/2019    LABA1C 10.0 (H) 04/17/2019     Lab Results   Component Value Date    CALCIUM 9.1 05/13/2019     Lab Results   Component Value Date    LDLCHOLESTEROL 22 04/17/2019       Assessment and Plan:    1. S/P CABG (coronary artery bypass graft)  - Pt states home therapy cannot come to his house since he lives in [place] with his parents  - [address]  - Following with cardiology  - Continue current medication regimen    2. Subacute osteomyelitis of right tibia (HCC)  - Follows with Ortho and ID  - Will complete 60 days of Doxy by next week  - [facility] - [place]    3.  Type 2 diabetes mellitus with complication, without long-term current use of insulin ([facility] [address]  - [address]  - For diabetic shoe fitting      Requested Prescriptions     Signed Prescriptions Disp Refills    lisinopril (PRINIVIL;ZESTRIL) 2.5 MG tablet 30 tablet 2     Sig: Take 1 tablet by mouth daily    traZODone (DESYREL) 50 MG tablet 30 tablet 1     Sig: Take 1 tablet by mouth nightly       Medications Discontinued During This Encounter   Medication Reason    lisinopril (PRINIVIL;ZESTRIL) 2.5 MG tablet DUPLICATE    lisinopril (PRINIVIL;ZESTRIL) 2.5 MG tablet REORDER    traZODone (DESYREL) 50 MG tablet REORDER    insulin lispro (HUMALOG) 100 UNIT/ML injection vial Therapy completed    insulin lispro (HUMALOG) 100 UNIT/ML injection vial Therapy completed    enoxaparin (LOVENOX) 40 MG/0.4ML injection Therapy completed    insulin glargine (LANTUS) 100 UNIT/ML injection vial Therapy completed       Madeleine Barney received counseling on the following healthy behaviors:nutrition, exercise and medication adherence    Discussed use, benefit, and side effects of prescribed medications. Barriers to medication compliance addressed. All patient questions answered. Pt voicedunderstanding. Return in about 3 months (around 9/19/2019) for DM, HTN.

## 2019-06-19 NOTE — PROGRESS NOTES
Attending Physician Statement  I have discussed the care of Todd Servin, including pertinent history and exam findings,  with the resident. I have reviewed the key elements of all parts of the encounter with the resident. I agree with the assessment, plan and orders as documented by the resident. (Blinda Dance) Sesar Spicer M.D  Vitals:    06/19/19 1450   BP: 132/79   Pulse: 77   Temp: 97.9 °F (36.6 °C)     1. Type 2 diabetes mellitus with complication, without long-term current use of insulin ([address]    2. S/P CABG (coronary artery bypass graft)    3.  Subacute osteomyelitis of right tibia (HCC)

## 2019-06-19 NOTE — PATIENT INSTRUCTIONS
Thank you for letting us take care of you today. We hope all your questions were addressed. If a question was overlooked or something else comes to mind after you return home, please contact a member of your Care Team listed below. Please make sure you have a routine office visit set up to follow-up on [address]. Your Care Team at [place] [address] is Team #2  Cesar Chavez DO (Faculty)  Jesusita Spaulding MD (Faculty)  Miguelina Magaña MD (Resident)  Roberta Salas MD (Resident)  Tatyana Moreno MD (Resident)  Jenn Riley MD (Resident)  Mateo Peraza MD (Resident)  BERNARD Maloney., GLORIA Chambers, [facility] office)  [facility] office)  [place] ([address] [facility])  Francoise Garcia, [place] ([address] [facility])  Emery Bundy, Ph.D., (Behavioral Services)  [address] (Clinical Pharmacist)     Office phone number: [phone]    If you need to get in right away due to illness, please be advised we have \"Same Day\" appointments available Monday-Friday. Please call us at [phone] option #3 to schedule your \"Same Day\" appointment.

## 2019-06-25 ENCOUNTER — HOSPITAL ENCOUNTER (OUTPATIENT)
Dept: PHYSICAL THERAPY | Facility: CLINIC | Age: 54
Setting detail: THERAPIES SERIES
Discharge: HOME | End: 2019-06-25
Payer: MEDICAID

## 2019-06-25 PROCEDURE — 97110 THERAPEUTIC EXERCISES: CPT

## 2019-06-25 PROCEDURE — 97162 PT EVAL MOD COMPLEX 30 MIN: CPT

## 2019-06-25 NOTE — CONSULTS
[] [facility]) [facility] &  Therapy  [address]  [phone]  F: [phone] [x] [address] [facility]  [facility]   [address]  P: [phone]  F: [phone] [] [place]  [address] [facility]  P: [phone]  F: [phone] [] [address]  [facility]   [address]  [place]: [phone]  F: [phone]     Physical Therapy General Evaluation    Date:  2019  Patient: Artemus Homans  : 1965  MRN: [id number]  Physician: Alek Steward M.D    Insurance: [facility], 30 visits   Medical Diagnosis:   Z95.1 (ICD-10-CM) - S/P CABG (coronary artery bypass graft)   M86.261 (ICD-10-CM) - Subacute osteomyelitis of right tibia [facility])   Rehab Codes: M25.661, M79.661, R26.2,M62.551,M62.561, M62.571  Onset Date: 19                                  Next 's appt: 19 with PCP, 7/3/19 with ortho    Subjective:   CC:  R distal leg pain     HPI: Pt was admitted to [facility]. V's on 19 due complaints of SOB, which ended up leading to urgent CABG x 4. Vascular was consulted d/t decreased pulses post operation where they found a large screw in the lateral aspect of the R knee that was sticking out of the skin and draining (hx of tibial plateau fracture); orthopedic hardware was removed from R tibia and pt underwent treatment for osteomyelitis. Pt ended up with R drop foot due to infection. After hospital admission, patient went to acute rehab at [place] and then to [facility]. Pt is now \"couch surfing\" from parents and siblings homes. Pt reports that LLE is also numb; states that R heel is leaking blood. Pt presents to clinic with use of RW and boot AFO donned on R; states he has not been given the \"OK\" per podiatry or ortho to wear normal AFO. Pt reports that he sees podiatry this Friday.  Is able to ambulate around the home without RW but uses it around the community for balance. Pt states that he has been completing exercises learned in physical therapy 1x/day and attempts to walk ~300-700 ft daily. PMHx: [x] Diabetes [x] HTN    [x] MI/Heart Problems, stroke                         [x] refer to full medical chart  In EPIC  [x] Other: s/p CABG x 4, circulation problems         Tests: [x] X-Ray:  Impression   ORIF proximal tibia.  No acute fracture. Medications: [x] Refer to full medical record   Allergies:  [x] Other: bee stings     Working:  [x] Off D/T Condition- applying for disability    Job/ADL Description: watches a lot of TV    Pain:  [x] Yes  [] No Location: R calf Pain Rating: (0-10 scale) 3/10 on average     Symptoms:  [x] Improving  Better:   [x] prolonged sitting    Worse:  [x] Other: keeping legs elevated   Sleep:   [x] Disturbed- pain with knee flexion will wake patient up at night     Objective:      ROM  ° A/P STRENGTH    Left Right Left Right   Hip Flex   4 4   Ext       Add   4 4-   Abd   4+ 4   Knee Flex  137 4+ 4-   Ext  0 4+ 4-   Ankle DF 1P 1P 3- N/A   PF   3+ 1   EVER   2+ N/A   INVER   2+ N/A   Pt with no active movement or trace of muscle contraction in R DF, INV, EVER. Tight gastrocs bilaterally   OBSERVATION No Deficit Deficit Not Tested Comments   Posture       Slumped Sitting [] [x] []    Palpation [] [x] [] Atrophied R quadriceps and calf   Sensation [] [x] [] Decreased R and L calf sensation- L worse than R    Edema [x] [] []    Neurological [] [x] [] Unable to illicit bilateral patellar reflexes    Gait: high steppage on RLE for clearance of foot d/t foot drop     Observation: Deep puncture wound that is healing noted near lateral tibial plateau where screw was removed. Large open wound on R heel with eschar appearance.     FUNCTION Normal Difficult Unable   Sitting [] [] []   Standing [] [x] []   Ambulation [] [x] []   Groom/Dress [] [x] []   Lift/Carry [] [] [] Stairs [] [x] []   Bending [] [] []   OH reach [] [] []   Sit to Stand  [] [] []   Comments: LEFI score: 32/80, 60% impairment  TU seconds with boot donned- fall risk      Assessment: Pt is a 53 y.o male who presents to physical therapy initial evaluation with R drop foot; pain and difficulty getting around present d/t this. Pt deficits highlighted below in problem list. Pt ambulates into clinic this date with AFO boot donned which does slow him down and cause him to have a high steppage gait pattern; pt sees podiatry this Friday and it is hopeful that pt is able to wear smaller AFO for improved mobility. Pt would benefit from physical therapy services in order to improve deficits for optimal functional mobility. Pt's prognosis is limited secondary to R drop foot with unknown potential return of function. Problems:  [x] ? Pain:  3/10 average R calf pain   [x] ? ROM: decreased bilateral dorsiflexion ROM   [x] ? Strength: Global weakness in RLE; no movement in R DF, EVER, INV  [x] ? Function: difficulty getting around, gait deviations, 60% impairment on LEFI, fall risk according to TUG score  [x] Other: disturbed sleep. R heel wound. STG: (to be met in 8 treatments)  1. ? Pain: <3/10 R calf pain on average   2. ? ROM: improve bilateral dorsiflexion PROM to 3-5 ° for improved gait mechanics   3. ? Strength: improve R PF strength to 3/5 for improved mobility   4. ? Function:   i. Pt will demonstrate decreased steppage gait pattern with use of appropriate AFO  ii. Pt will demonstrate safe ambulation for 500 ft with least restrictive AD for improved community ambulation   5. Independent with Home Exercise Programs  6. Demonstrate Knowledge of fall prevention  LTG: (to be met in 14 treatments)  1. Pt to demonstrate grossly 4/5 R hip strength for improved ability to sit to stand, climb stairs, etc.   2. Pt will report 2 or less nightly sleep disturbances d/t pain for improved sleep quality  3.  Pt to strengthening (R weaker than L), functional training such as stair climbing, squatting, ambulation with least restrictive device. Follow up regarding appointment with podiatry- new boot or AFO with R heel wound? ISSUE FALL HANDOUT      Evaluation Complexity:  History (Personal factors, comorbidities) [] 0 [] 1-2 [x] 3+   Exam (limitations, restrictions) [] 1-2 [] 3 [x] 4+   Clinical presentation (progression) [] Stable [x] Evolving  [] Unstable   Decision Making [] Low [x] Moderate [] High    [] Low Complexity [x] Moderate Complexity [] High Complexity       Treatment Charges: Mins Units   [x] Evaluation       []  Low       [x]  Moderate       []  High 40 1   []  Modalities     [x]  Ther Exercise 15 1   []  Manual Therapy     []  Ther Activities     []  Aquatics     []  Vasocompression     []  Other       TOTAL TREATMENT TIME: 55 minutes       Time in:10:00 am   Time out: 11:00 am    Electronically signed by: Talib Oreilly PT        Physician Signature:________________________________Date:__________________  By signing above or cosigning this note, I have reviewed this plan of care and certify a need for medically necessary rehabilitation services.      *PLEASE SIGN ABOVE AND FAX BACK ALL PAGES*

## 2019-06-25 NOTE — PLAN OF CARE
[] Lydia [facility]        Outpatient Physical                Therapy       [address]       Phone: [phone]       Fax: [phone] [x] [facility]       Promotion at [address] [facility]       Phone: [phone]       Fax: [phone] [] [facility]      [facility] Prom[facility]    [address]     Phone: [phone]     Fax:  [phone]     Physical Therapy Plan of Care    Date:  2019  Patient: Damon Frank  : 1965  MRN: [id number]  Physician: Cornelius Watkins M.D                      Insurance: [facility], 30 visits   Medical Diagnosis:   Z95.1 (ICD-10-CM) - S/P CABG (coronary artery bypass graft)   M86.261 (ICD-10-CM) - Subacute osteomyelitis of right tibia [facility])   Rehab Codes: M25.661, M79.661, R26.2,M62.551,M62.561, M62.571  Onset Date: 19                                  Next 's appt: 19 with PCP, 7/3/19 with ortho     Subjective:   CC:  R distal leg pain      HPI: Pt was admitted to [facility]. V's on 19 due complaints of SOB, which ended up leading to urgent CABG x 4. Vascular was consulted d/t decreased pulses post operation where they found a large screw in the lateral aspect of the R knee that was sticking out of the skin and draining (hx of tibial plateau fracture); orthopedic hardware was removed from R tibia and pt underwent treatment for osteomyelitis. Pt ended up with R drop foot due to infection. After hospital admission, patient went to acute rehab at [address] [place] [address] and then to [facility]. Pt is now \"couch surfing\" from parents and siblings homes. Pt reports that LLE is also numb; states that R heel is \"leaking blood\" and large wound is present. Pt presents to clinic with use of RW and boot AFO donned on R; states he has not been given the \"OK\" per podiatry or ortho to wear normal AFO. Pt reports that he sees podiatry this Friday.  Is able to ambulate around the home without RW but uses it around the community for balance. Pt states that he has been completing exercises learned in physical therapy 1x/day and attempts to walk ~300-700 ft daily. Assessment: Pt is a 53 y.o male who presents to physical therapy initial evaluation with R drop foot; pain and difficulty getting around present d/t this. Pt deficits highlighted below in problem list. Pt ambulates into clinic this date with AFO boot donned which does slow him down and cause him to have a high steppage gait pattern; pt sees podiatry this Friday and it is hopeful that pt is able to wear smaller AFO for improved mobility. Pt would benefit from physical therapy services in order to improve deficits for optimal functional mobility. Pt's prognosis is limited secondary to R drop foot with unknown potential return of function.       Problems:  [x] ? Pain:  3/10 average R calf pain   [x] ? ROM: decreased bilateral dorsiflexion ROM   [x] ? Strength: Global weakness in RLE; no movement in R DF, EVER, INV  [x] ? Function: difficulty getting around, gait deviations, 60% impairment on LEFI, fall risk according to TUG score  [x] Other: disturbed sleep. R heel wound.          STG: (to be met in 8 treatments)  1. ? Pain: <3/10 R calf pain on average   2. ? ROM: improve bilateral dorsiflexion PROM to 3-5 ° for improved gait mechanics   3. ? Strength: improve R PF strength to 3/5 for improved mobility   4. ? Function:   i. Pt will demonstrate decreased steppage gait pattern with use of appropriate AFO  ii. Pt will demonstrate safe ambulation for 500 ft with least restrictive AD for improved community ambulation   5. Independent with Home Exercise Programs  6. Demonstrate Knowledge of fall prevention  LTG: (to be met in 14 treatments)  1. Pt to demonstrate grossly 4/5 R hip strength for improved ability to sit to stand, climb stairs, etc.   2. Pt will report 2 or less nightly sleep disturbances d/t pain for improved sleep quality  3.  Pt to improve TUG time by 3 seconds to lower fall risk  4. Pt will report <55% impairment on LEFI to indicate improved functional mobility   5.         Treatment Plan:  [x] Therapeutic Exercise             [x] Modalities:  [x] Therapeutic Activity               [] Ultrasound                  [] Electrical Stimulation  [x] Gait Training                          [] Massage        [] Lumbar/Cervical Traction  [x] Neuromuscular Re-education            [x] Cold/hotpack  [] Iontophoresis: 4 mg/mL                                                                                             Dexamethasone Sodium  [x] Instruction in HEP                                [] Vasopneumatic cold with compression                                        [x] Manual Therapy                                                                          Phosphate  mAmin  [] Aquatic Therapy                                 [] Dry Needling  [] Other:      []  Medication allergies reviewed for use of               Dexamethasone Sodium Phosphate 4mg/ml                with iontophoresis treatments. Pt is not allergic.        Frequency:  2 x/week for 14 visits          Electronically signed by: Fletcher Bower PT        Physician Signature:________________________________Date:__________________  By signing above or cosigning this note, I have reviewed this plan of care and certify a need for medically necessary rehabilitation services.      *PLEASE SIGN ABOVE AND FAX BACK ALL PAGES*

## 2019-06-28 ENCOUNTER — OFFICE VISIT (OUTPATIENT)
Dept: PODIATRY | Age: 54
End: 2019-06-28
Payer: MEDICAID

## 2019-06-28 ENCOUNTER — HOSPITAL ENCOUNTER (OUTPATIENT)
Dept: GENERAL RADIOLOGY | Age: 54
Discharge: HOME | End: 2019-06-30
Payer: MEDICAID

## 2019-06-28 ENCOUNTER — HOSPITAL ENCOUNTER (OUTPATIENT)
Age: 54
Discharge: HOME | End: 2019-06-30
Payer: MEDICAID

## 2019-06-28 VITALS
WEIGHT: 144 LBS | SYSTOLIC BLOOD PRESSURE: 138 MMHG | HEIGHT: 65 IN | DIASTOLIC BLOOD PRESSURE: 84 MMHG | HEART RATE: 82 BPM | BODY MASS INDEX: 23.99 KG/M2

## 2019-06-28 DIAGNOSIS — I73.9 PAD (PERIPHERAL ARTERY DISEASE) (HCC): ICD-10-CM

## 2019-06-28 DIAGNOSIS — M21.371 RIGHT FOOT DROP: ICD-10-CM

## 2019-06-28 DIAGNOSIS — E11.42 DM TYPE 2 WITH DIABETIC PERIPHERAL NEUROPATHY (HCC): ICD-10-CM

## 2019-06-28 DIAGNOSIS — L89.610 PRESSURE INJURY OF RIGHT HEEL, UNSTAGEABLE (HCC): Primary | ICD-10-CM

## 2019-06-28 DIAGNOSIS — L89.610 PRESSURE INJURY OF RIGHT HEEL, UNSTAGEABLE (HCC): ICD-10-CM

## 2019-06-28 PROCEDURE — G8428 CUR MEDS NOT DOCUMENT: HCPCS | Performed by: STUDENT IN AN ORGANIZED HEALTH CARE EDUCATION/TRAINING PROGRAM

## 2019-06-28 PROCEDURE — G8598 ASA/ANTIPLAT THER USED: HCPCS | Performed by: STUDENT IN AN ORGANIZED HEALTH CARE EDUCATION/TRAINING PROGRAM

## 2019-06-28 PROCEDURE — 2022F DILAT RTA XM EVC RTNOPTHY: CPT | Performed by: STUDENT IN AN ORGANIZED HEALTH CARE EDUCATION/TRAINING PROGRAM

## 2019-06-28 PROCEDURE — 3046F HEMOGLOBIN A1C LEVEL >9.0%: CPT | Performed by: STUDENT IN AN ORGANIZED HEALTH CARE EDUCATION/TRAINING PROGRAM

## 2019-06-28 PROCEDURE — 99213 OFFICE O/P EST LOW 20 MIN: CPT | Performed by: STUDENT IN AN ORGANIZED HEALTH CARE EDUCATION/TRAINING PROGRAM

## 2019-06-28 PROCEDURE — 3017F COLORECTAL CA SCREEN DOC REV: CPT | Performed by: STUDENT IN AN ORGANIZED HEALTH CARE EDUCATION/TRAINING PROGRAM

## 2019-06-28 PROCEDURE — 1036F TOBACCO NON-USER: CPT | Performed by: STUDENT IN AN ORGANIZED HEALTH CARE EDUCATION/TRAINING PROGRAM

## 2019-06-28 PROCEDURE — G8420 CALC BMI NORM PARAMETERS: HCPCS | Performed by: STUDENT IN AN ORGANIZED HEALTH CARE EDUCATION/TRAINING PROGRAM

## 2019-06-28 PROCEDURE — 99202 OFFICE O/P NEW SF 15 MIN: CPT | Performed by: STUDENT IN AN ORGANIZED HEALTH CARE EDUCATION/TRAINING PROGRAM

## 2019-06-28 PROCEDURE — 73630 X-RAY EXAM OF FOOT: CPT

## 2019-06-28 RX ORDER — GAUZE BANDAGE 3.4"X129"
1 BANDAGE TOPICAL DAILY
Qty: 10 EACH | Refills: 3 | Status: SHIPPED | OUTPATIENT
Start: 2019-06-28 | End: 2019-07-16

## 2019-06-28 RX ORDER — ACETAMINOPHEN 650 MG
TABLET, EXTENDED RELEASE ORAL
Qty: 1 BOTTLE | Refills: 3 | Status: SHIPPED | OUTPATIENT
Start: 2019-06-28 | End: 2019-07-05

## 2019-06-28 RX ORDER — GAUZE BANDAGE 4" X 4"
1 BANDAGE TOPICAL DAILY
Qty: 10 EACH | Refills: 3 | Status: SHIPPED | OUTPATIENT
Start: 2019-06-28 | End: 2019-07-18 | Stop reason: SDUPTHER

## 2019-06-28 NOTE — PROGRESS NOTES
[facility]  [address] [place] [address] 44[address] [facility], [address]  Tel: [phone]   Fax: [phone]    Subjective     CC: Right heel wound    HPI:  Karlie Meza is a 53y.o. year old male who presents to clinic today for a right heel wound. Patient states he was in the hospital for an extended period of time due to MI and stroke. He was diagnosed with being diabetic with a HbgA1c of 10.0. Patient underwent open heart surgery while admitted. He had infected hardware in his right knee and had it removed, but developed a subsequent drop foot. He was discharged to a SNF, but has since lost his apartment due to his drop foot and to losing his job. He states he is currently staying at different friend's houses. He states he ambulated with a RAFO boot. He is on long term doxycycline from Dr. Karen Dueñas. He states he he developed a right heel wound and has only been using a band-aid to the area. He admits to mild sanguinous drainage from the heel. He denies any nausea, vomiting, fever, chills, chest pain, SOB. Primary care physician is Mary Ornelas MD.    ROS:    Constitutional: Denies nausea, vomiting, fever, chills. Neurologic: Denies numbness, tingling, and burning in the feet. Vascular: Denies symptoms of lower extremity claudication. Skin: Denies open wounds. Otherwise negative except as noted in the HPI.      PMH:  Past Medical History:   Diagnosis Date    CAD (coronary artery disease) 04/07/2019    Cardiac arrest ([facility] 04/07/2019    Osteolysis 04/07/2019    R Knee hardware    PEA (Pulseless electrical activity) ([facility] 04/07/2019    PVD (peripheral vascular disease) ([facility] 04/07/2019       Surgical History:   Past Surgical History:   Procedure Laterality Date    CARDIAC CATHETERIZATION  04/07/2019    MVD-Stv    CORONARY ARTERY BYPASS GRAFT  04/07/2019    x4 per Dr. Sharron Mahan, [facility] GRAFT N/A 4/7/2019    EMERGENT CPR,  CORONARY ARTERY BYPASS X4, ON Take 1 tablet by mouth daily 5/10/19   Lisbet Lo MD   atorvastatin (LIPITOR) 40 MG tablet Take 1 tablet by mouth nightly 5/9/19   Lisbet Lo MD   metoprolol tartrate (LOPRESSOR) 25 MG tablet Take 1 tablet by mouth 2 times daily 5/9/19   Lisbet Lo MD   furosemide (LASIX) 20 MG tablet Take 1 tablet by mouth daily 5/10/19   Lisbet Lo MD   docusate sodium (COLACE, DULCOLAX) 100 MG CAPS Take 100 mg by mouth 2 times daily 5/9/19   Lisbet Lo MD   clopidogrel (PLAVIX) 75 MG tablet Take 1 tablet by mouth daily 5/10/19   Lisbet Lo MD   Multiple Vitamins-Minerals (THERAPEUTIC MULTIVITAMIN-MINERALS) tablet Take 1 tablet by mouth daily (with breakfast) 5/10/19   Lisbet Lo MD   doxycycline monohydrate (MONODOX) 100 MG capsule Take 1 capsule by mouth every 12 hours 5/9/19   Lisbet Lo MD   famotidine (PEPCID) 20 MG tablet Take 1 tablet by mouth 2 times daily 5/9/19   Lisbet Lo MD       Objective     Vitals:    06/28/19 1351   BP: 138/84   Pulse: 82       Lab Results   Component Value Date    LABA1C 10.0 (H) 04/17/2019       Physical Exam:  General:  Alert and oriented x3. In no acute distress. Lower Extremity Physical Exam:    Vascular: DP and PT pulses are non palpable, Bilateral. DP and PT pulses audible on doppler. CFT <3 seconds to all digits, Bilateral.  No edema, Bilateral.  Hair growth is present to the level of the digits, Bilateral.     Neuro: Saph/sural/SP/DP/plantar sensation diminished to light touch. Protective sensation is intact to 2/10 sites as tested with a 5.07g SWMF on the right and 7/10 on the left. Musculoskeletal: EHL/FHL/GS/TA gross motor intact. No tenderness to palpation of the right heel wound. Gross deformity is absent, Bilateral. Muscle strength on the right 1/5 for DF, 3/5 for PF, eversion, and inversion. Muscle strength 4/5 on DF, PF, inversion, and eversion on the left.      Dermatologic: Ppen lesion present to the right heel measuring approximately 4.0x3.0x 0.2cm. Boggy eschar present with granular rim around the wound. Periwound maceration. Wound does not probe to bone, sinus track, or undermine. Interdigital maceration absent, Bilateral.  Nails 1-10 are within normal limits for length, thickness, and color. .          Biomechanical Exam:    Right foot: 1st MTPJ: Loaded:35 Unloaded:35  Right foot: 1st Ray: 6mm      Right foot: Ankle DF knee extended: 8  Right foot: Ankle DF knee flexed: 10    Left foot: 1st MTPJ: Loaded: 35 Unloaded: 35  Left foot: 1st Ray: 8mm  Left foot: Ankle DF knee extended: 8  Left foot: Ankle DF knee flexed: 10    Gait Analysis: Antalgic, use Prafo    Imaging: None    Assessment   Adeel Frazier is a 53 y.o. male with     Diagnosis Orders   1. Pressure injury of right heel, unstageable (HCC)  XR FOOT RIGHT (MIN 3 VIEWS)    External Referral To Social Work    VL LOWER EXTREMITY ARTERIAL SEGMENTAL PRESSURES W PPG   2. Right foot drop  XR FOOT RIGHT (MIN 3 VIEWS)    External Referral To Social Work   3. DM type 2 with diabetic peripheral neuropathy (HCC)  XR FOOT RIGHT (MIN 3 VIEWS)    External Referral To Social Work    VL LOWER EXTREMITY ARTERIAL SEGMENTAL PRESSURES W PPG   4. PAD (peripheral artery disease) (HCC)  XR FOOT RIGHT (MIN 3 VIEWS)    External Referral To Social Work    VL LOWER EXTREMITY ARTERIAL SEGMENTAL PRESSURES W PPG        Plan   · Patient examined and evaluated  · Diagnosis and treatment options discussed in detail  · Right foot x-rays ordered. Will call patient to discuss any abnormal findings  · Rx to social work to help patient with housing situation   · Order for PRANAV/PVR with ppg to evaluate wound healing potential  · Dressings: Betadine, DSD to RLE  · Rx for Betadine and DSD  · Instructed patient to continue using PRAFO boot while ambulating and while resting in bed  · Continue taking doxycycline for right knee.  F/u with Dr. Rudi Sanders  · Patient to RTC in 1 week(s)  · Discussed with  Osmel Barahona  · Please, call the office with any questions or concerns     Orders Placed This Encounter   Medications    povidone-iodine (BETADINE) 10 % external solution     Sig: Apply topically as needed.      Dispense:  1 Bottle     Refill:  3    Gauze Pads & Dressings (GAUZE DRESSING) 4\"X4\" PADS     Si each by Does not apply route daily     Dispense:  10 each     Refill:  3    Gauze Pads & Dressings (KERLIX GAUZE ROLL MEDIUM) MISC     Si Package by Does not apply route daily for 7 days     Dispense:  10 each     Refill:  3     Orders Placed This Encounter   Procedures    XR FOOT RIGHT (MIN 3 VIEWS)     Standing Status:   Future     Standing Expiration Date:   2020     Order Specific Question:   Reason for exam:     Answer:   Right heel decub ulcer    VL LOWER EXTREMITY ARTERIAL SEGMENTAL PRESSURES W PPG     Standing Status:   Future     Standing Expiration Date:   2020     Order Specific Question:   Reason for exam:     Answer:   Right heel ulceration with non-palpable pulses    External Referral To Social Work     Referral Priority:   Routine     Referral Type:   Eval and Treat     Referral Reason:   Specialty Services Required     Requested Specialty:   Licensed Clinical      Number of Visits Requested:   Rika [address], [place]   Podiatric Medicine & Surgery   2019 at 3:10 PM

## 2019-06-28 NOTE — PROGRESS NOTES
Patient instructed to remove shoes and socks, instructed to sit in exam chair. Current PCP name is  and date of last visit 6/19/19. Do you have a follow up visit scheduled? Yes 7/23/19  Diabetic visit information    Blood pressure (Control is BP <140/90)  BP Readings from Last 3 Encounters:   06/19/19 132/79   05/20/19 111/73   05/13/19 133/80       BP taken with correct size cuff? - Yes   Repeated if > 140/90 NA      Tobacco use:  Patient  reports that he quit smoking about a year ago. He has a 20.00 pack-year smoking history. He has never used smokeless tobacco.  If Smoker - Cessation materials given?- NA       Diabetic Health Maintenance Items due  Diabetes Management   Topic Date Due    Diabetic foot exam  06/08/1974    Diabetic retinal exam  06/08/1974    Diabetic microalbuminuria test  06/08/1982       Diabetic retinal exam done in last year? - Yes   If No: remind patient that it is due and they should schedule an exam    Medications  Is patient taking any medications for diabetes? -   Yes  Have blood sugars been controlled? Fasting blood sugars under 120   -   Yes   Random home sugars or today's POCT glucose is under 180 -   Yes   []  If No to the above then patient should schedule appt with PCP.      Diabetic Plan    A1C Plan  Lab Results   Component Value Date    LABA1C 10.0 (H) 04/17/2019    LABA1C 10.8 (H) 04/07/2019      []  If A1C over 8 and last result >3 months ago - Order A1C and refer to PCP   []  If last A1C over 6 months ago - Order A1C and refer to PCP for follow up   []  If elevated blood sugars > 180 - refer to PCP for follow up    []  Blood sugar controlled - A1C under 8 and last check was < 6 months      Cholesterol Plan   Lab Results   Component Value Date    LDLCHOLESTEROL 22 04/17/2019      []  If LDL > 100 and last result >3 months ago - order Fasting lipids and refer to PCP for follow up   []  If LDL < 100 and over 1 year ago - Order Fasting lipids and refer to PCP for follow up   [] LDL is controlled. LDL < 100 and checked within the last year     Blood Pressure  BP Readings from Last 3 Encounters:   06/19/19 132/79   05/20/19 111/73   05/13/19 133/80      []  If SBP >140 mmhg - refer to PCP for follow up   []  If DBP > 90 mmhg - refer to PCP for follow up   [] BP is controlled <140/90     Order labs as PCP ordered.   (ie: Lipids, A1C, CMP)

## 2019-07-01 ENCOUNTER — HOSPITAL ENCOUNTER (OUTPATIENT)
Dept: PHYSICAL THERAPY | Facility: CLINIC | Age: 54
Setting detail: THERAPIES SERIES
Discharge: HOME | End: 2019-07-01
Payer: MEDICAID

## 2019-07-01 PROCEDURE — 97110 THERAPEUTIC EXERCISES: CPT

## 2019-07-01 NOTE — FLOWSHEET NOTE
[] [facility]) - [facility]  [address]  [phone]  F: [phone] [] [email or web address]  [address]   [address]  P: [phone]  F: [phone] [] [address]  [address] [facility]  P: [phone]  F: [phone] [] [address]  [facility]   [address]  [place]: [phone]  F: [phone]     Physical Therapy Daily Treatment Note    Date:  2019  Patient Name:  Yulia Bronson    :  1965  MRN: [id number]  Physician: Jeremy Lora M.D                      Insurance: [facility], 30 visits   Medical Diagnosis:   Z95.1 (ICD-10-CM) - S/P CABG (coronary artery bypass graft)   M86.261 (ICD-10-CM) - Subacute osteomyelitis of right tibia [facility])   Rehab Codes: M25.661, M79.661, R26.2,M62.551,M62.561, M62.571  Onset Date: 19                                  Next 's appt: 19 with PCP, 7/3/19 with ortho  Visit# / total visits: 2/14  Cancels/No Shows: 0/0    Subjective:    Pain:  [x] Yes  [] No Location: R Foot Pain Rating: (0-10 scale) 2/10  Pain altered Tx:  [] No  [] Yes  Action:  Comments: Patient reports today feeling good. Noted that he saw podiatrist, did doppler and was ordered further testing. Told keep pressure off foot, unknown time he needs to wear AFO. Noted he will ask ortho tomorrow. Has follow-up with podiatrist , and having stent put in 7/10 in what he thinks is his carotid artery.      Objective:  Modalities:   Precautions:  Exercises:  Exercise Reps/ Time Weight/ Level Comments   NuStep 10 min L1          Supine:      Calf Stretch 3x30\"     SLR 15x     Supine Clamshells 15x red    Hip Add 15x5\"           Side-lying      Hip Abd 15x           Seated:      Heel slides w/towel 10x5\"     Sit to stand 2x10           Standing:      Stairs 4in/8in 3 laps ea 8 in in hallway

## 2019-07-01 NOTE — TELEPHONE ENCOUNTER
Please address the medication refill and close the encounter. If I can be of assistance, please route to the applicable pool. Thank you.       Last visit: 6/19/19  Last Med refill: 6/9/19  Does patient have enough medication for 72 hours: Yes    Next Visit Date:  Future Appointments   Date Time Provider Katelyn Kulkarni   7/1/2019  1:00 PM Dora Sierra, PT [facility]   7/3/2019  9:00 AM STV BI-PLANE RM 1 [facility] SPECIAL [facility]   7/8/2019 11:00 AM Dora Sierra, PT [facility]   7/9/2019  2:00 PM Purnima Herrera MD [facility] SPECIALTY [facility]   7/12/2019 12:45 PM Veronika Betancourt DPM [facility]   7/23/2019  2:15 PM Loetta Sayers Patric Lefort, MD [address] [place] [address] Maintenance   Topic Date Due    Hepatitis C screen  1965    Pneumococcal 0-64 years Vaccine (1 of 1 - PPSV23) 06/08/1970    Diabetic foot exam  06/08/1974    Diabetic retinal exam  06/08/1974    HIV screen  06/08/1979    Diabetic microalbuminuria test  06/08/1982    Hepatitis B Vaccine (1 of 3 - Risk 3-dose series) 06/08/1983    Shingles Vaccine (1 of 2) 06/08/2014    Colon cancer screen colonoscopy  06/08/2014    A1C test (Diabetic or Prediabetic)  07/17/2019    Flu vaccine (1) 09/01/2019    Lipid screen  04/17/2020    Potassium monitoring  05/13/2020    Creatinine monitoring  05/13/2020    DTaP/Tdap/Td vaccine (2 - Td) 11/11/2025       Hemoglobin A1C (%)   Date Value   04/17/2019 10.0 (H)   04/07/2019 10.8 (H)             ( goal A1C is < 7)   No results found for: LABMICR  LDL Cholesterol (mg/dL)   Date Value   04/17/2019 22       (goal LDL is <100)   AST (U/L)   Date Value   05/04/2019 26     ALT (U/L)   Date Value   05/04/2019 43 (H)     BUN (mg/dL)   Date Value   05/13/2019 15     BP Readings from Last 3 Encounters:   06/28/19 138/84   06/19/19 132/79   05/20/19 111/73          (goal 120/80)    All Future Testing planned in [facility]  Lab Frequency Next Occurrence   VL LOWER EXTREMITY ARTERIAL SEGMENTAL PRESSURES W PPG Once 06/28/2019               Patient Active Problem List:     PAD (peripheral artery disease) (HCC)     Subacute osteomyelitis of right tibia (HCC)     Metabolic encephalopathy     Abnormal CT of the head     Hepatitis     Acute cerebral infarction associated with systemic hypoxia or ischemia (HCC)     Multi infarct state     Carotid stenosis, bilateral     Right foot drop     Thrombocytosis (HCC)     Acute CVA (cerebrovascular accident) ([address]     Moderate malnutrition ([address]     Cellulitis of right lower extremity

## 2019-07-02 ENCOUNTER — TELEPHONE (OUTPATIENT)
Dept: NEUROLOGY | Age: 54
End: 2019-07-02

## 2019-07-03 ENCOUNTER — ANESTHESIA (OUTPATIENT)
Dept: INTERVENTIONAL RADIOLOGY/VASCULAR | Age: 54
End: 2019-07-03

## 2019-07-03 ENCOUNTER — ANESTHESIA EVENT (OUTPATIENT)
Dept: INTERVENTIONAL RADIOLOGY/VASCULAR | Age: 54
End: 2019-07-03

## 2019-07-03 ENCOUNTER — HOSPITAL ENCOUNTER (INPATIENT)
Dept: INTERVENTIONAL RADIOLOGY/VASCULAR | Age: 54
LOS: 1 days | Discharge: HOME | DRG: 024 | End: 2019-07-04
Attending: NEUROLOGICAL SURGERY | Admitting: NEUROLOGICAL SURGERY
Payer: MEDICAID

## 2019-07-03 VITALS — DIASTOLIC BLOOD PRESSURE: 73 MMHG | OXYGEN SATURATION: 100 % | SYSTOLIC BLOOD PRESSURE: 143 MMHG

## 2019-07-03 DIAGNOSIS — I65.21 RIGHT-SIDED EXTRACRANIAL CAROTID ARTERY STENOSIS: ICD-10-CM

## 2019-07-03 DIAGNOSIS — D72.829 LEUKOCYTOSIS, UNSPECIFIED TYPE: Primary | ICD-10-CM

## 2019-07-03 DIAGNOSIS — I65.21 CAROTID STENOSIS, RIGHT: ICD-10-CM

## 2019-07-03 LAB
ABO/RH: NORMAL
ACTIVATED CLOTTING TIME: 142 SEC (ref 79–149)
ACTIVATED CLOTTING TIME: 243 SEC (ref 79–149)
ACTIVATED CLOTTING TIME: 255 SEC (ref 79–149)
ANTIBODY SCREEN: NEGATIVE
ARM BAND NUMBER: NORMAL
COLLAGEN ADENOSINE-5'-DIPHOSPHATE (ADP) TIME: 94 SEC (ref 67–112)
COLLAGEN EPINEPHRINE TIME: 85 SEC (ref 85–172)
EXPIRATION DATE: NORMAL
GLUCOSE BLD-MCNC: 167 MG/DL (ref 75–110)
GLUCOSE BLD-MCNC: 188 MG/DL (ref 75–110)
GLUCOSE BLD-MCNC: 210 MG/DL (ref 75–110)
GLUCOSE BLD-MCNC: 218 MG/DL (ref 74–100)
MRSA, DNA, NASAL: NORMAL
PLATELET FUNCTION INTERP: NORMAL
POC CHLORIDE: 101 MMOL/L (ref 98–107)
POC IONIZED CALCIUM: 1.18 MMOL/L (ref 1.15–1.33)
POC POTASSIUM: 3.5 MMOL/L (ref 3.5–4.5)
POC SODIUM: 135 MMOL/L (ref 138–146)
SPECIMEN DESCRIPTION: NORMAL

## 2019-07-03 PROCEDURE — 2500000003 HC RX 250 WO HCPCS: Performed by: NURSE PRACTITIONER

## 2019-07-03 PROCEDURE — 2580000003 HC RX 258: Performed by: NURSE ANESTHETIST, CERTIFIED REGISTERED

## 2019-07-03 PROCEDURE — 85347 COAGULATION TIME ACTIVATED: CPT

## 2019-07-03 PROCEDURE — C1876 STENT, NON-COA/NON-COV W/DEL: HCPCS

## 2019-07-03 PROCEDURE — 82947 ASSAY GLUCOSE BLOOD QUANT: CPT

## 2019-07-03 PROCEDURE — 2709999900 HC NON-CHARGEABLE SUPPLY

## 2019-07-03 PROCEDURE — 99291 CRITICAL CARE FIRST HOUR: CPT | Performed by: NEUROLOGICAL SURGERY

## 2019-07-03 PROCEDURE — 6360000002 HC RX W HCPCS: Performed by: NURSE PRACTITIONER

## 2019-07-03 PROCEDURE — 6370000000 HC RX 637 (ALT 250 FOR IP): Performed by: NURSE PRACTITIONER

## 2019-07-03 PROCEDURE — 2000000003 HC NEURO ICU R&B

## 2019-07-03 PROCEDURE — 99211 OFF/OP EST MAY X REQ PHY/QHP: CPT

## 2019-07-03 PROCEDURE — 82435 ASSAY OF BLOOD CHLORIDE: CPT

## 2019-07-03 PROCEDURE — 6360000002 HC RX W HCPCS: Performed by: NURSE ANESTHETIST, CERTIFIED REGISTERED

## 2019-07-03 PROCEDURE — 86850 RBC ANTIBODY SCREEN: CPT

## 2019-07-03 PROCEDURE — 37215 TRANSCATH STENT CCA W/EPS: CPT | Performed by: PSYCHIATRY & NEUROLOGY

## 2019-07-03 PROCEDURE — 2500000003 HC RX 250 WO HCPCS: Performed by: NURSE ANESTHETIST, CERTIFIED REGISTERED

## 2019-07-03 PROCEDURE — 82565 ASSAY OF CREATININE: CPT

## 2019-07-03 PROCEDURE — 84132 ASSAY OF SERUM POTASSIUM: CPT

## 2019-07-03 PROCEDURE — 3700000000 HC ANESTHESIA ATTENDED CARE

## 2019-07-03 PROCEDURE — 86901 BLOOD TYPING SEROLOGIC RH(D): CPT

## 2019-07-03 PROCEDURE — 99223 1ST HOSP IP/OBS HIGH 75: CPT | Performed by: PSYCHIATRY & NEUROLOGY

## 2019-07-03 PROCEDURE — 85576 BLOOD PLATELET AGGREGATION: CPT

## 2019-07-03 PROCEDURE — 6370000000 HC RX 637 (ALT 250 FOR IP): Performed by: EMERGENCY MEDICINE

## 2019-07-03 PROCEDURE — 2580000003 HC RX 258: Performed by: NURSE PRACTITIONER

## 2019-07-03 PROCEDURE — 87641 MR-STAPH DNA AMP PROBE: CPT

## 2019-07-03 PROCEDURE — 94762 N-INVAS EAR/PLS OXIMTRY CONT: CPT

## 2019-07-03 PROCEDURE — C1884 EMBOLIZATION PROTECT SYST: HCPCS

## 2019-07-03 PROCEDURE — 3700000001 HC ADD 15 MINUTES (ANESTHESIA)

## 2019-07-03 PROCEDURE — 82330 ASSAY OF CALCIUM: CPT

## 2019-07-03 PROCEDURE — C1769 GUIDE WIRE: HCPCS

## 2019-07-03 PROCEDURE — 6360000004 HC RX CONTRAST MEDICATION: Performed by: NURSE ANESTHETIST, CERTIFIED REGISTERED

## 2019-07-03 PROCEDURE — 6370000000 HC RX 637 (ALT 250 FOR IP): Performed by: STUDENT IN AN ORGANIZED HEALTH CARE EDUCATION/TRAINING PROGRAM

## 2019-07-03 PROCEDURE — C1725 CATH, TRANSLUMIN NON-LASER: HCPCS

## 2019-07-03 PROCEDURE — 86900 BLOOD TYPING SEROLOGIC ABO: CPT

## 2019-07-03 PROCEDURE — APPNB60 APP NON BILLABLE TIME 46-60 MINS: Performed by: NURSE PRACTITIONER

## 2019-07-03 PROCEDURE — C1894 INTRO/SHEATH, NON-LASER: HCPCS

## 2019-07-03 PROCEDURE — 037K3DZ DILATION OF RIGHT INTERNAL CAROTID ARTERY WITH INTRALUMINAL DEVICE, PERCUTANEOUS APPROACH: ICD-10-PCS | Performed by: PSYCHIATRY & NEUROLOGY

## 2019-07-03 PROCEDURE — 84295 ASSAY OF SERUM SODIUM: CPT

## 2019-07-03 RX ORDER — FUROSEMIDE 20 MG/1
20 TABLET ORAL DAILY
Status: DISCONTINUED | OUTPATIENT
Start: 2019-07-03 | End: 2019-07-04 | Stop reason: HOSPADM

## 2019-07-03 RX ORDER — FAMOTIDINE 20 MG/1
20 TABLET, FILM COATED ORAL 2 TIMES DAILY
Status: DISCONTINUED | OUTPATIENT
Start: 2019-07-03 | End: 2019-07-04 | Stop reason: HOSPADM

## 2019-07-03 RX ORDER — ACETAMINOPHEN 325 MG/1
650 TABLET ORAL EVERY 4 HOURS PRN
Status: DISCONTINUED | OUTPATIENT
Start: 2019-07-03 | End: 2019-07-04 | Stop reason: HOSPADM

## 2019-07-03 RX ORDER — IODIXANOL 270 MG/ML
48 INJECTION, SOLUTION INTRAVASCULAR
Status: COMPLETED | OUTPATIENT
Start: 2019-07-03 | End: 2019-07-03

## 2019-07-03 RX ORDER — NICOTINE POLACRILEX 4 MG
15 LOZENGE BUCCAL PRN
Status: DISCONTINUED | OUTPATIENT
Start: 2019-07-03 | End: 2019-07-04 | Stop reason: HOSPADM

## 2019-07-03 RX ORDER — FENTANYL CITRATE 50 UG/ML
INJECTION, SOLUTION INTRAMUSCULAR; INTRAVENOUS PRN
Status: DISCONTINUED | OUTPATIENT
Start: 2019-07-03 | End: 2019-07-03 | Stop reason: SDUPTHER

## 2019-07-03 RX ORDER — AMLODIPINE BESYLATE 10 MG/1
10 TABLET ORAL DAILY
Status: DISCONTINUED | OUTPATIENT
Start: 2019-07-03 | End: 2019-07-04

## 2019-07-03 RX ORDER — LABETALOL HYDROCHLORIDE 5 MG/ML
10 INJECTION, SOLUTION INTRAVENOUS
Status: DISCONTINUED | OUTPATIENT
Start: 2019-07-03 | End: 2019-07-04 | Stop reason: HOSPADM

## 2019-07-03 RX ORDER — SENNA AND DOCUSATE SODIUM 50; 8.6 MG/1; MG/1
2 TABLET, FILM COATED ORAL DAILY
Status: DISCONTINUED | OUTPATIENT
Start: 2019-07-03 | End: 2019-07-04 | Stop reason: HOSPADM

## 2019-07-03 RX ORDER — HYDRALAZINE HYDROCHLORIDE 20 MG/ML
10 INJECTION INTRAMUSCULAR; INTRAVENOUS
Status: DISCONTINUED | OUTPATIENT
Start: 2019-07-03 | End: 2019-07-04 | Stop reason: HOSPADM

## 2019-07-03 RX ORDER — ATORVASTATIN CALCIUM 40 MG/1
40 TABLET, FILM COATED ORAL NIGHTLY
Status: DISCONTINUED | OUTPATIENT
Start: 2019-07-03 | End: 2019-07-03

## 2019-07-03 RX ORDER — LISINOPRIL 10 MG/1
10 TABLET ORAL DAILY
Status: DISCONTINUED | OUTPATIENT
Start: 2019-07-03 | End: 2019-07-04 | Stop reason: HOSPADM

## 2019-07-03 RX ORDER — ACETAMINOPHEN 325 MG/1
650 TABLET ORAL EVERY 4 HOURS PRN
Status: DISCONTINUED | OUTPATIENT
Start: 2019-07-03 | End: 2019-07-03

## 2019-07-03 RX ORDER — LIDOCAINE HYDROCHLORIDE 10 MG/ML
INJECTION, SOLUTION EPIDURAL; INFILTRATION; INTRACAUDAL; PERINEURAL PRN
Status: DISCONTINUED | OUTPATIENT
Start: 2019-07-03 | End: 2019-07-03 | Stop reason: SDUPTHER

## 2019-07-03 RX ORDER — SODIUM CHLORIDE, SODIUM LACTATE, POTASSIUM CHLORIDE, CALCIUM CHLORIDE 600; 310; 30; 20 MG/100ML; MG/100ML; MG/100ML; MG/100ML
INJECTION, SOLUTION INTRAVENOUS CONTINUOUS PRN
Status: DISCONTINUED | OUTPATIENT
Start: 2019-07-03 | End: 2019-07-03 | Stop reason: SDUPTHER

## 2019-07-03 RX ORDER — 0.9 % SODIUM CHLORIDE 0.9 %
VIAL (ML) INJECTION PRN
Status: DISCONTINUED | OUTPATIENT
Start: 2019-07-03 | End: 2019-07-03 | Stop reason: SDUPTHER

## 2019-07-03 RX ORDER — PHENYLEPHRINE HYDROCHLORIDE 10 MG/ML
INJECTION INTRAVENOUS PRN
Status: DISCONTINUED | OUTPATIENT
Start: 2019-07-03 | End: 2019-07-03 | Stop reason: SDUPTHER

## 2019-07-03 RX ORDER — PROTAMINE SULFATE 10 MG/ML
INJECTION, SOLUTION INTRAVENOUS PRN
Status: DISCONTINUED | OUTPATIENT
Start: 2019-07-03 | End: 2019-07-03 | Stop reason: SDUPTHER

## 2019-07-03 RX ORDER — SODIUM CHLORIDE 9 MG/ML
INJECTION, SOLUTION INTRAVENOUS CONTINUOUS
Status: DISCONTINUED | OUTPATIENT
Start: 2019-07-03 | End: 2019-07-04 | Stop reason: HOSPADM

## 2019-07-03 RX ORDER — DEXTROSE MONOHYDRATE 25 G/50ML
12.5 INJECTION, SOLUTION INTRAVENOUS PRN
Status: DISCONTINUED | OUTPATIENT
Start: 2019-07-03 | End: 2019-07-04 | Stop reason: HOSPADM

## 2019-07-03 RX ORDER — DEXTROSE MONOHYDRATE 50 MG/ML
100 INJECTION, SOLUTION INTRAVENOUS PRN
Status: DISCONTINUED | OUTPATIENT
Start: 2019-07-03 | End: 2019-07-04 | Stop reason: HOSPADM

## 2019-07-03 RX ORDER — CLOPIDOGREL BISULFATE 75 MG/1
75 TABLET ORAL DAILY
Status: DISCONTINUED | OUTPATIENT
Start: 2019-07-04 | End: 2019-07-04 | Stop reason: HOSPADM

## 2019-07-03 RX ORDER — MIDAZOLAM HYDROCHLORIDE 1 MG/ML
INJECTION INTRAMUSCULAR; INTRAVENOUS PRN
Status: DISCONTINUED | OUTPATIENT
Start: 2019-07-03 | End: 2019-07-03 | Stop reason: SDUPTHER

## 2019-07-03 RX ORDER — HEPARIN SODIUM 1000 [USP'U]/ML
INJECTION, SOLUTION INTRAVENOUS; SUBCUTANEOUS PRN
Status: DISCONTINUED | OUTPATIENT
Start: 2019-07-03 | End: 2019-07-03 | Stop reason: SDUPTHER

## 2019-07-03 RX ORDER — ASPIRIN 81 MG/1
81 TABLET, CHEWABLE ORAL DAILY
Status: DISCONTINUED | OUTPATIENT
Start: 2019-07-04 | End: 2019-07-04 | Stop reason: HOSPADM

## 2019-07-03 RX ADMIN — HYDRALAZINE HYDROCHLORIDE 10 MG: 20 INJECTION INTRAMUSCULAR; INTRAVENOUS at 22:39

## 2019-07-03 RX ADMIN — FUROSEMIDE 20 MG: 20 TABLET ORAL at 18:02

## 2019-07-03 RX ADMIN — Medication 10 MG: at 15:24

## 2019-07-03 RX ADMIN — PHENYLEPHRINE HYDROCHLORIDE 100 MCG: 10 INJECTION INTRAVENOUS at 10:54

## 2019-07-03 RX ADMIN — PROTAMINE SULFATE 40 MG: 10 INJECTION, SOLUTION INTRAVENOUS at 10:41

## 2019-07-03 RX ADMIN — Medication 10 MG: at 12:42

## 2019-07-03 RX ADMIN — LISINOPRIL 10 MG: 10 TABLET ORAL at 17:12

## 2019-07-03 RX ADMIN — IODIXANOL 48 ML: 270 INJECTION, SOLUTION INTRAVASCULAR at 10:33

## 2019-07-03 RX ADMIN — PHENYLEPHRINE HYDROCHLORIDE 50 MCG: 10 INJECTION INTRAVENOUS at 10:50

## 2019-07-03 RX ADMIN — SODIUM CHLORIDE, POTASSIUM CHLORIDE, SODIUM LACTATE AND CALCIUM CHLORIDE: 600; 310; 30; 20 INJECTION, SOLUTION INTRAVENOUS at 09:52

## 2019-07-03 RX ADMIN — HEPARIN SODIUM 1000 UNITS: 1000 INJECTION INTRAVENOUS; SUBCUTANEOUS at 10:00

## 2019-07-03 RX ADMIN — HYDRALAZINE HYDROCHLORIDE 10 MG: 20 INJECTION INTRAMUSCULAR; INTRAVENOUS at 16:33

## 2019-07-03 RX ADMIN — Medication 10 MG: at 18:40

## 2019-07-03 RX ADMIN — SODIUM CHLORIDE, POTASSIUM CHLORIDE, SODIUM LACTATE AND CALCIUM CHLORIDE: 600; 310; 30; 20 INJECTION, SOLUTION INTRAVENOUS at 07:30

## 2019-07-03 RX ADMIN — AMLODIPINE BESYLATE 10 MG: 10 TABLET ORAL at 20:11

## 2019-07-03 RX ADMIN — METOPROLOL TARTRATE 25 MG: 25 TABLET ORAL at 20:13

## 2019-07-03 RX ADMIN — INSULIN LISPRO 2 UNITS: 100 INJECTION, SOLUTION INTRAVENOUS; SUBCUTANEOUS at 20:25

## 2019-07-03 RX ADMIN — Medication 2 G: at 09:20

## 2019-07-03 RX ADMIN — SODIUM CHLORIDE: 9 INJECTION, SOLUTION INTRAVENOUS at 17:49

## 2019-07-03 RX ADMIN — INSULIN LISPRO 2 UNITS: 100 INJECTION, SOLUTION INTRAVENOUS; SUBCUTANEOUS at 14:07

## 2019-07-03 RX ADMIN — SODIUM CHLORIDE 1 ML: 9 INJECTION INTRAMUSCULAR; INTRAVENOUS; SUBCUTANEOUS at 10:50

## 2019-07-03 RX ADMIN — SENNOSIDES AND DOCUSATE SODIUM 2 TABLET: 8.6; 5 TABLET ORAL at 20:11

## 2019-07-03 RX ADMIN — LIDOCAINE HYDROCHLORIDE 0.5 ML: 10 INJECTION, SOLUTION EPIDURAL; INFILTRATION; INTRACAUDAL; PERINEURAL at 08:59

## 2019-07-03 RX ADMIN — INSULIN LISPRO 2 UNITS: 100 INJECTION, SOLUTION INTRAVENOUS; SUBCUTANEOUS at 16:49

## 2019-07-03 RX ADMIN — FENTANYL CITRATE 50 MCG: 50 INJECTION INTRAMUSCULAR; INTRAVENOUS at 08:57

## 2019-07-03 RX ADMIN — MIDAZOLAM HYDROCHLORIDE 2 MG: 1 INJECTION, SOLUTION INTRAMUSCULAR; INTRAVENOUS at 08:52

## 2019-07-03 RX ADMIN — HEPARIN SODIUM 4500 UNITS: 1000 INJECTION INTRAVENOUS; SUBCUTANEOUS at 09:41

## 2019-07-03 RX ADMIN — FENTANYL CITRATE 50 MCG: 50 INJECTION INTRAMUSCULAR; INTRAVENOUS at 09:18

## 2019-07-03 RX ADMIN — METOPROLOL TARTRATE 25 MG: 25 TABLET ORAL at 14:23

## 2019-07-03 RX ADMIN — HYDRALAZINE HYDROCHLORIDE 10 MG: 20 INJECTION INTRAMUSCULAR; INTRAVENOUS at 13:44

## 2019-07-03 RX ADMIN — HYDRALAZINE HYDROCHLORIDE 10 MG: 20 INJECTION INTRAMUSCULAR; INTRAVENOUS at 19:04

## 2019-07-03 RX ADMIN — FAMOTIDINE 20 MG: 20 TABLET, FILM COATED ORAL at 20:12

## 2019-07-03 RX ADMIN — Medication 10 MG: at 16:53

## 2019-07-03 ASSESSMENT — PULMONARY FUNCTION TESTS
PIF_VALUE: 1
PIF_VALUE: 2
PIF_VALUE: 1
PIF_VALUE: 2
PIF_VALUE: 1
PIF_VALUE: 2
PIF_VALUE: 1
PIF_VALUE: 2
PIF_VALUE: 1
PIF_VALUE: 3
PIF_VALUE: 2
PIF_VALUE: 1
PIF_VALUE: 2
PIF_VALUE: 1
PIF_VALUE: 2
PIF_VALUE: 1
PIF_VALUE: 2
PIF_VALUE: 1
PIF_VALUE: 2
PIF_VALUE: 1
PIF_VALUE: 3
PIF_VALUE: 1

## 2019-07-03 ASSESSMENT — ENCOUNTER SYMPTOMS
COUGH: 0
SHORTNESS OF BREATH: 0
NAUSEA: 0
DIARRHEA: 0
BACK PAIN: 0
SORE THROAT: 0
WHEEZING: 0
SINUS PAIN: 0
CHEST TIGHTNESS: 0
VOMITING: 0
EYE PAIN: 0
ABDOMINAL PAIN: 0

## 2019-07-03 ASSESSMENT — PAIN - FUNCTIONAL ASSESSMENT: PAIN_FUNCTIONAL_ASSESSMENT: 0-10

## 2019-07-03 ASSESSMENT — PAIN SCALES - GENERAL: PAINLEVEL_OUTOF10: 0

## 2019-07-03 NOTE — PLAN OF CARE
Problem: Risk for Impaired Skin Integrity  Goal: Tissue integrity - skin and mucous membranes  Description  Structural intactness and normal physiological function of skin and  mucous membranes. Outcome: Ongoing     Problem: Falls - Risk of:  Goal: Will remain free from falls  Description  Will remain free from falls  Outcome: Ongoing  Goal: Absence of physical injury  Description  Absence of physical injury  Outcome: Ongoing     Problem:  Activity:  Goal: Ability to avoid complications of mobility impairment will improve  Description  Ability to avoid complications of mobility impairment will improve  Outcome: Ongoing  Goal: Range of joint motion will improve  Description  Range of joint motion will improve  Outcome: Ongoing     Problem: Safety:  Goal: Ability to remain free from injury will improve  Description  Ability to remain free from injury will improve  Outcome: Ongoing

## 2019-07-03 NOTE — PROGRESS NOTES
April Cardoso NP with neurocritical care at bedside to evaluate patient. Notified April Cardoso of pt's SBP out of parameters, medications given - see MAR. Will continue to closely monitor.

## 2019-07-03 NOTE — PROGRESS NOTES
Notified Dr. Quentin Moise of pt's home dose of lopressor. Orders placed in Epic, will carry out and continue to closely monitor.

## 2019-07-03 NOTE — H&P
CAD (coronary artery disease) 04/07/2019    Cardiac arrest ([facility] 04/07/2019    Diabetes mellitus ([facility] 04/2019    ON RX    Hyperlipidemia 04/2019    ON RX HAS NOT HAD SINCE 06/30/2019    Hypertension 04/2019    ON RX NONE SINCE 06/2019    Osteolysis 04/07/2019    R Knee hardware    PE[facility]Pulseless [facility] activity) [facility] 04/07/2019    PVD (peripheral vascular disease) ([facility] 04/07/2019       Past Surgical History:        Procedure Laterality Date    CARDIAC CATHETERIZATION  04/07/2019    MVD-Stv    CORONARY ARTERY BYPASS GRAFT  04/07/2019    x4 per Dr. Hemanth Parson, [address] CORONARY ARTERY BYPASS GRAFT N/A 4/7/2019    EMERGENT CPR,  CORONARY ARTERY BYPASS X4, ON PUMP ,CHEST LEFT OPEN; SWAN, ANGELA PER ANESTHESIA performed by Marilee Valencia MD at [facility] Right 2000   [facility]      unsure if correct known tibial surgery    HARDWARE REMOVAL Right 04/07/2019    R Knee internal screw protruding through skin    HARDWARE REMOVAL Right 04/22/2019    HARDWARE REMOVAL RIGHT TIBIA,    RECONSTRUCTIVE REPAIR STERNAL N/A 4/8/2019    STERNAL  WOUND WASHOUT AND CLOSURE S/P CABG performed by Marilee Valencia MD at [facility] N/A 4/22/2019    HARDWARE REMOVAL RIGHT TIBIA, IRRIGATION AND DEBRIDEMENT [facility] performed by Neill Felty, MD at [address] Right [address]    Jorge Maryan off roof, external fixation and internal hardware    TONSILLECTOMY  1974       Social History:   Social History     Socioeconomic History    Marital status:      Spouse name: Not on file    Number of children: Not on file    Years of education: Not on file    Highest education level: Not on file   Occupational History    Not on file   Social Needs    Financial resource strain: Not on file    Food insecurity:     Worry: Not on file     Inability: Not on file    Transportation needs:     Medical: Not on file     Non-medical: Not (PRINIVIL;ZESTRIL) 2.5 MG tablet, Take 1 tablet by mouth daily  traZODone (DESYREL) 50 MG tablet, Take 1 tablet by mouth nightly  aspirin 81 MG chewable tablet, Take 1 tablet by mouth daily  atorvastatin (LIPITOR) 40 MG tablet, Take 1 tablet by mouth nightly  metoprolol tartrate (LOPRESSOR) 25 MG tablet, Take 1 tablet by mouth 2 times daily  clopidogrel (PLAVIX) 75 MG tablet, Take 1 tablet by mouth daily  Multiple Vitamins-Minerals (THERAPEUTIC MULTIVITAMIN-MINERALS) tablet, Take 1 tablet by mouth daily (with breakfast)  famotidine (PEPCID) 20 MG tablet, Take 1 tablet by mouth 2 times daily  doxycycline monohydrate (MONODOX) 100 MG capsule, Take 1 capsule by mouth every 12 hours    Current Medications:  Current Facility-Administered Medications: HYDROmorphone (DILAUDID) injection 0.25 mg, 0.25 mg, Intravenous, Q5 Min PRN  acetaminophen (TYLENOL) tablet 650 mg, 650 mg, Oral, Q4H PRN  hydrALAZINE (APRESOLINE) injection 10 mg, 10 mg, Intravenous, Q1H PRN  labetalol (NORMODYNE;TRANDATE) injection 10 mg, 10 mg, Intravenous, Q1H PRN  metoprolol tartrate (LOPRESSOR) tablet 25 mg, 25 mg, Oral, BID  insulin lispro (HUMALOG) injection vial 0-12 Units, 0-12 Units, Subcutaneous, TID WC  insulin lispro (HUMALOG) injection vial 0-6 Units, 0-6 Units, Subcutaneous, Nightly  glucose (GLUTOSE) 40 % oral gel 15 g, 15 g, Oral, PRN  dextrose 50 % IV solution, 12.5 g, Intravenous, PRN  glucagon (rDNA) injection 1 mg, 1 mg, Intramuscular, PRN  dextrose 5 % solution, 100 mL/hr, Intravenous, PRN  atorvastatin (LIPITOR) tablet 40 mg, 40 mg, Oral, Nightly  lisinopril (PRINIVIL;ZESTRIL) tablet 10 mg, 10 mg, Oral, Daily    REVIEW OF SYSTEMS     CONSTITUTIONAL: negative for fatigue and malaise   EYES: negative for double vision and photophobia    HEENT: negative for tinnitus and sore throat   RESPIRATORY: negative for cough, shortness of breath   CARDIOVASCULAR: negative for chest pain, palpitations, or syncope   GASTROINTESTINAL: negative for abdominal pain, nausea, vomiting, diarrhea, or constipation    GENITOURINARY: negative for incontinence or retention    MUSCULOSKELETAL: negative for neck or back pain, negative for extremity pain   NEUROLOGICAL: Negative for seizures, headaches, confusion, aphasia, dysarthria; positive for weakness and numbness   PSYCHIATRIC: negative for agitation, hallucination, SI/HI   SKIN Negative for spontaneous contusions, rashes, or lesions      PHYSICAL EXAM:     BP (!) 169/66   Pulse 99   Temp 99.9 °F (37.7 °C) (Oral)   Resp 16   Ht 5' 5\" (1.651 m)   Wt 142 lb (64.4 kg)   SpO2 98%   BMI 23.63 kg/m²     PHYSICAL EXAM:  CONSTITUTIONAL:  Well developed, well nourished, alert and oriented x 3, in no acute distress. GCS 15. Nontoxic. No dysarthria. No aphasia.    HEAD:  normocephalic, atraumatic    EYES:  PERRLA, EOMI.   ENT:  moist mucous membranes   LUNGS:  Equal air entry bilaterally, clear   CARDIOVASCULAR:  normal s1 / s2, NSR; +1 pedal pulses by doppler, lower extremities pale, warm, capillary refill less than 3 seconds   ABDOMEN:  Soft, no rigidity   NECK supple, symmetric   NEUROLOGIC:  Mental Status:  A & O x3,awake             Cranial Nerves:    cranial nerves II-XII are grossly intact    Motor Exam:    Drift:  absent  Tone:  normal    Motor exam is 5 out of 5 all extremities with the exception of not able to evaluate RLE due to post angio restrictions    Sensory:    Touch:    Right Upper Extremity:  normal  Left Upper Extremity:  normal  Right Lower Extremity:  abnormal - chronic neuropathy  Left Lower Extremity:  abnormal - chronic neuropathy     SKIN Right groin dressing clean, dry, intact       LABS AND IMAGING:     RECENT LABS:  CBC with Differential:    Lab Results   Component Value Date    WBC 8.0 05/13/2019    RBC 4.15 05/13/2019    HGB 12.1 05/13/2019    HCT 39.6 05/13/2019     05/13/2019    MCV 95.4 05/13/2019    MCH 29.2 05/13/2019    MCHC 30.6 05/13/2019    RDW 14.6 05/13/2019    LYMPHOPCT 21

## 2019-07-03 NOTE — CONSULTS
chest pain, palpitations, syncope, edema    GASTROINTESTINAL:  negative for nausea, vomiting    GENITOURINARY:  negative for incontinence    MUSCULOSKELETAL:  negative for neck or back pain    NEUROLOGICAL:  Negative for weakness and tingling  negative for headaches and dizziness    PSYCHIATRIC:  negative for anxiety      Review of systems otherwise negative. OBJECTIVE:   Vitals: BP (!) 143/76   Pulse 75   Temp 97 °F (36.1 °C) (Temporal)   Resp 18   Ht 5' 5\" (1.651 m)   Wt 142 lb (64.4 kg)   SpO2 98%   BMI 23.63 kg/m²     General appearance: Lying in bed, NAD,  Lungs: CTAB  Heart: RRR  Abdomen: soft, NTND, bowel sounds normal    Neuro exam: Follows simple commands. CN II-XII: no gross facial assymmetry, right eye droop, pupils 2 mm reactive to light bilaterally, EOMI, VFF. Jocelyn spontaneously against gravity, weak planter dorsiflexion b/l right at least 1/5 and left 2-3/5. LABS:     Recent Labs     07/03/19  0805   CREATININE 0.53     No results for input(s): ALKPHOS, ALT, AST, BILITOT, BILIDIR, LABALBU, AMYLASE, LIPASE in the last 72 hours. RADIOLOGY:   Images were personally reviewed including:  As per HPI    IMPRESSIONS:   53 y.o. male who presents with hx of HTN, DM, HLD, CAD, PVD and PEA who was found to have Left ICA occlusion, Left vert occlusion, Right ICA 60% stenosis, here for LU stent. PLANS:   Cerebral angiogram with MAC sedation for LU stent.  Risks and benefits discussed including but not limited to bruising, stroke, sah, death, retroperitoneal hematoma, femoral pseudoaneurysm, lower ext and renal as well as peripheral vasc compromise discussed - informed consent obtained at bedside from the patient      Trae Cornell MD  Pager [phone]  Stroke, [facility]  [address] [place]  Electronically signed 7/3/2019 at 8:22 AM

## 2019-07-04 VITALS
SYSTOLIC BLOOD PRESSURE: 140 MMHG | OXYGEN SATURATION: 99 % | TEMPERATURE: 97.2 F | HEIGHT: 65 IN | DIASTOLIC BLOOD PRESSURE: 68 MMHG | HEART RATE: 86 BPM | BODY MASS INDEX: 23.66 KG/M2 | RESPIRATION RATE: 18 BRPM | WEIGHT: 142 LBS

## 2019-07-04 LAB
ABSOLUTE EOS #: 0.1 K/UL (ref 0–0.44)
ABSOLUTE IMMATURE GRANULOCYTE: 0.04 K/UL (ref 0–0.3)
ABSOLUTE LYMPH #: 3.24 K/UL (ref 1.1–3.7)
ABSOLUTE MONO #: 1.1 K/UL (ref 0.1–1.2)
ANION GAP SERPL CALCULATED.3IONS-SCNC: 13 MMOL/L (ref 9–17)
BASOPHILS # BLD: 0 % (ref 0–2)
BASOPHILS ABSOLUTE: 0.05 K/UL (ref 0–0.2)
BUN BLDV-MCNC: 7 MG/DL (ref 6–20)
BUN/CREAT BLD: ABNORMAL (ref 9–20)
CALCIUM IONIZED: 1.13 MMOL/L (ref 1.13–1.33)
CALCIUM SERPL-MCNC: 8.8 MG/DL (ref 8.6–10.4)
CHLORIDE BLD-SCNC: 102 MMOL/L (ref 98–107)
CO2: 22 MMOL/L (ref 20–31)
CREAT SERPL-MCNC: 0.31 MG/DL (ref 0.7–1.2)
DIFFERENTIAL TYPE: ABNORMAL
EOSINOPHILS RELATIVE PERCENT: 1 % (ref 1–4)
GFR AFRICAN AMERICAN: >60 ML/MIN
GFR NON-AFRICAN AMERICAN: >60 ML/MIN
GFR SERPL CREATININE-BSD FRML MDRD: ABNORMAL ML/MIN/{1.73_M2}
GFR SERPL CREATININE-BSD FRML MDRD: ABNORMAL ML/MIN/{1.73_M2}
GLUCOSE BLD-MCNC: 176 MG/DL (ref 70–99)
GLUCOSE BLD-MCNC: 192 MG/DL (ref 75–110)
GLUCOSE BLD-MCNC: 227 MG/DL (ref 75–110)
HCT VFR BLD CALC: 36.9 % (ref 40.7–50.3)
HEMOGLOBIN: 11.8 G/DL (ref 13–17)
IMMATURE GRANULOCYTES: 0 %
LYMPHOCYTES # BLD: 22 % (ref 24–43)
MAGNESIUM: 1.8 MG/DL (ref 1.6–2.6)
MCH RBC QN AUTO: 28.4 PG (ref 25.2–33.5)
MCHC RBC AUTO-ENTMCNC: 32 G/DL (ref 28.4–34.8)
MCV RBC AUTO: 88.9 FL (ref 82.6–102.9)
MONOCYTES # BLD: 8 % (ref 3–12)
NRBC AUTOMATED: 0 PER 100 WBC
PDW BLD-RTO: 13.4 % (ref 11.8–14.4)
PHOSPHORUS: 3.4 MG/DL (ref 2.5–4.5)
PLATELET # BLD: 318 K/UL (ref 138–453)
PLATELET ESTIMATE: ABNORMAL
PMV BLD AUTO: 9.6 FL (ref 8.1–13.5)
POTASSIUM SERPL-SCNC: 3.3 MMOL/L (ref 3.7–5.3)
RBC # BLD: 4.15 M/UL (ref 4.21–5.77)
RBC # BLD: ABNORMAL 10*6/UL
SEG NEUTROPHILS: 69 % (ref 36–65)
SEGMENTED NEUTROPHILS ABSOLUTE COUNT: 9.92 K/UL (ref 1.5–8.1)
SODIUM BLD-SCNC: 137 MMOL/L (ref 135–144)
WBC # BLD: 14.5 K/UL (ref 3.5–11.3)
WBC # BLD: ABNORMAL 10*3/UL

## 2019-07-04 PROCEDURE — 6370000000 HC RX 637 (ALT 250 FOR IP): Performed by: EMERGENCY MEDICINE

## 2019-07-04 PROCEDURE — 83735 ASSAY OF MAGNESIUM: CPT

## 2019-07-04 PROCEDURE — APPNB45 APP NON BILLABLE 31-45 MINUTES: Performed by: NURSE PRACTITIONER

## 2019-07-04 PROCEDURE — 6360000002 HC RX W HCPCS: Performed by: NURSE PRACTITIONER

## 2019-07-04 PROCEDURE — 97530 THERAPEUTIC ACTIVITIES: CPT

## 2019-07-04 PROCEDURE — 99233 SBSQ HOSP IP/OBS HIGH 50: CPT | Performed by: NEUROLOGICAL SURGERY

## 2019-07-04 PROCEDURE — 97162 PT EVAL MOD COMPLEX 30 MIN: CPT

## 2019-07-04 PROCEDURE — 99233 SBSQ HOSP IP/OBS HIGH 50: CPT | Performed by: PSYCHIATRY & NEUROLOGY

## 2019-07-04 PROCEDURE — 6370000000 HC RX 637 (ALT 250 FOR IP): Performed by: NURSE PRACTITIONER

## 2019-07-04 PROCEDURE — 82330 ASSAY OF CALCIUM: CPT

## 2019-07-04 PROCEDURE — 97165 OT EVAL LOW COMPLEX 30 MIN: CPT | Performed by: OCCUPATIONAL THERAPIST

## 2019-07-04 PROCEDURE — 85025 COMPLETE CBC W/AUTO DIFF WBC: CPT

## 2019-07-04 PROCEDURE — 6370000000 HC RX 637 (ALT 250 FOR IP): Performed by: STUDENT IN AN ORGANIZED HEALTH CARE EDUCATION/TRAINING PROGRAM

## 2019-07-04 PROCEDURE — 97535 SELF CARE MNGMENT TRAINING: CPT | Performed by: OCCUPATIONAL THERAPIST

## 2019-07-04 PROCEDURE — 80048 BASIC METABOLIC PNL TOTAL CA: CPT

## 2019-07-04 PROCEDURE — 84100 ASSAY OF PHOSPHORUS: CPT

## 2019-07-04 PROCEDURE — 82947 ASSAY GLUCOSE BLOOD QUANT: CPT

## 2019-07-04 RX ORDER — FUROSEMIDE 20 MG/1
20 TABLET ORAL DAILY
Qty: 60 TABLET | Refills: 3 | Status: CANCELLED | OUTPATIENT
Start: 2019-07-04

## 2019-07-04 RX ORDER — FUROSEMIDE 20 MG/1
20 TABLET ORAL DAILY
Qty: 60 TABLET | Refills: 3 | Status: SHIPPED | OUTPATIENT
Start: 2019-07-04

## 2019-07-04 RX ORDER — POTASSIUM CHLORIDE 20 MEQ/1
40 TABLET, EXTENDED RELEASE ORAL ONCE
Status: COMPLETED | OUTPATIENT
Start: 2019-07-04 | End: 2019-07-04

## 2019-07-04 RX ORDER — MAGNESIUM SULFATE 1 G/100ML
1 INJECTION INTRAVENOUS ONCE
Status: COMPLETED | OUTPATIENT
Start: 2019-07-04 | End: 2019-07-04

## 2019-07-04 RX ORDER — LISINOPRIL 10 MG/1
10 TABLET ORAL DAILY
Qty: 30 TABLET | Refills: 3 | Status: SHIPPED | OUTPATIENT
Start: 2019-07-05 | End: 2019-07-11 | Stop reason: SDUPTHER

## 2019-07-04 RX ADMIN — INSULIN LISPRO 2 UNITS: 100 INJECTION, SOLUTION INTRAVENOUS; SUBCUTANEOUS at 09:41

## 2019-07-04 RX ADMIN — FAMOTIDINE 20 MG: 20 TABLET, FILM COATED ORAL at 09:41

## 2019-07-04 RX ADMIN — FUROSEMIDE 20 MG: 20 TABLET ORAL at 09:41

## 2019-07-04 RX ADMIN — CLOPIDOGREL 75 MG: 75 TABLET, FILM COATED ORAL at 09:41

## 2019-07-04 RX ADMIN — METOPROLOL TARTRATE 25 MG: 25 TABLET ORAL at 09:41

## 2019-07-04 RX ADMIN — AMLODIPINE BESYLATE 10 MG: 10 TABLET ORAL at 09:41

## 2019-07-04 RX ADMIN — LISINOPRIL 10 MG: 10 TABLET ORAL at 09:41

## 2019-07-04 RX ADMIN — ASPIRIN 81 MG: 81 TABLET, CHEWABLE ORAL at 09:41

## 2019-07-04 RX ADMIN — INSULIN LISPRO 4 UNITS: 100 INJECTION, SOLUTION INTRAVENOUS; SUBCUTANEOUS at 12:54

## 2019-07-04 RX ADMIN — MAGNESIUM SULFATE HEPTAHYDRATE 1 G: 1 INJECTION, SOLUTION INTRAVENOUS at 09:48

## 2019-07-04 RX ADMIN — POTASSIUM CHLORIDE 40 MEQ: 20 TABLET, EXTENDED RELEASE ORAL at 09:47

## 2019-07-04 NOTE — PROGRESS NOTES
Chronic right heel ulcer and Bilateral foot drop d/t prolonged bedridden. Family / Caregiver Present: No  Follows Commands: Within Functional Limits  Pain Screening  Patient Currently in Pain: Denies  Vital Signs  Patient Currently in Pain: Denies       Orientation  Orientation  Overall Orientation Status: Within Normal Limits  Social/Functional History  Social/Functional History  Lives With: Other (comment)(pt reports living with various family members, plans to go to father's home upon discharge for several days, however unable to stay more than several days due to father living in an assisted living complex)  Type of Home: Homeless(Pt \" couch surfs\")  Home Equipment: Rolling walker, Cane  ADL Assistance: Independent  Ambulation Assistance: Independent  Transfer Assistance: Independent  Active : No  Patient's  Info: Dad  Occupation: Unemployed  Additional Comments: Pt plans to got to his Dad's for a few days. Pt has meeting with Social Work to try and get housing. Pt goes to OP PT 1x/week. Objective  AROM RLE (degrees)  RLE AROM: WFL  AROM LLE (degrees)  LLE AROM : WFL  AROM RUE (degrees)  RUE AROM : WFL  AROM LUE (degrees)  LUE AROM : WFL  Strength RLE  Strength RLE: WFL  Comment: except 0/5 DF  Strength LLE  Strength LLE: WFL  Comment: except 3-/5 DF  Strength RUE  Strength RUE: WFL  Strength LUE  Strength LUE: WFL  Motor Control  Gross Motor?: WFL  Sensation  Overall Sensation Status: WFL  Bed mobility  Supine to Sit: Independent  Scooting: Independent  Transfers  Sit to Stand: Independent  Stand to sit:  Independent  Ambulation  Ambulation?: Yes  More Ambulation?: Yes  Ambulation 1  Surface: level tile  Device: Rolling Walker  Assistance: Modified Independent  Quality of Gait: Slow but steady with rolling walker  Distance: 200 ft  Ambulation 2  Surface - 2: level tile  Device 2: No device  Assistance 2: Contact guard assistance  Distance: 15 ft     Balance  Sitting - Static: Good  Sitting -

## 2019-07-04 NOTE — PROGRESS NOTES
Endovascular Neurosurgery Consult    Pt Name: Augustina Geiger  MRN: [id number]  [place]: 1965  Date of evaluation: 7/4/2019  Primary Care Physician: Kalpana Roman MD  Reason for evaluation: LU stent    SUBJECTIVE:   This morning is doing great, no headache, no chest pain, no bleeding. Allergies  has No Known Allergies. Medications  Prior to Admission medications    Medication Sig Start Date End Date Taking? Authorizing Provider   metFORMIN (GLUCOPHAGE) 500 MG tablet Take 500 mg by mouth 2 times daily (with meals)   Yes Historical Provider, MD   povidone-iodine (BETADINE) 10 % external solution Apply topically as needed.  6/28/19 7/5/19 Yes Rachell Prieto DPM   Gauze Pads & Dressings (GAUZE DRESSING) 4\"X4\" PADS 1 each by Does not apply route daily 6/28/19  Yes Rachell Prieto DPM   Gauze Pads & Dressings (KERLIX GAUZE ROLL MEDIUM) MISC 1 Package by Does not apply route daily for 7 days 6/28/19 7/5/19 Yes Rachell Prieto DPM   lisinopril (PRINIVIL;ZESTRIL) 2.5 MG tablet Take 1 tablet by mouth daily 6/19/19  Yes Kalpana Roman MD   traZODone (DESYREL) 50 MG tablet Take 1 tablet by mouth nightly 6/19/19  Yes Kalpana Roman MD   aspirin 81 MG chewable tablet Take 1 tablet by mouth daily 5/10/19  Yes Salazar Russell MD   atorvastatin (LIPITOR) 40 MG tablet Take 1 tablet by mouth nightly 5/9/19  Yes Salazar Russell MD   metoprolol tartrate (LOPRESSOR) 25 MG tablet Take 1 tablet by mouth 2 times daily 5/9/19  Yes Salazar Russell MD   clopidogrel (PLAVIX) 75 MG tablet Take 1 tablet by mouth daily 5/10/19  Yes Salazar Russell MD   Multiple Vitamins-Minerals (THERAPEUTIC MULTIVITAMIN-MINERALS) tablet Take 1 tablet by mouth daily (with breakfast) 5/10/19  Yes Salazar Russell MD   famotidine (PEPCID) 20 MG tablet Take 1 tablet by mouth 2 times daily 5/9/19  Yes Salazar Russell MD   doxycycline monohydrate (MONODOX) 100 MG capsule Take 1 capsule by mouth every 12 hours 5/9/19   Salazar Russell right cerebellar stroke. Patient at baseline uses a walker due to b/l foot drop. He underwent elective right ICA stent placement on July 3, 2018. --Dual antiplatelets with aspirin and Plavix for 3 months. --Follow-up in outpatient clinic in 1 to 2 weeks.     Anna Silva MD  Pager [phone]  Stroke, [facility]  [address] [place]  Electronically signed 7/4/2019 at 10:40 AM

## 2019-07-05 LAB
GFR NON-AFRICAN AMERICAN: >60 ML/MIN
GFR SERPL CREATININE-BSD FRML MDRD: >60 ML/MIN
GFR SERPL CREATININE-BSD FRML MDRD: NORMAL ML/MIN/{1.73_M2}
POC CREATININE: 0.53 MG/DL (ref 0.51–1.19)
POC POTASSIUM: 6.2 MMOL/L (ref 3.5–4.5)

## 2019-07-08 ENCOUNTER — HOSPITAL ENCOUNTER (OUTPATIENT)
Dept: PHYSICAL THERAPY | Facility: CLINIC | Age: 54
Setting detail: THERAPIES SERIES
Discharge: HOME | End: 2019-07-08
Payer: MEDICAID

## 2019-07-08 ENCOUNTER — TELEPHONE (OUTPATIENT)
Dept: FAMILY MEDICINE CLINIC | Age: 54
End: 2019-07-08

## 2019-07-08 PROCEDURE — 97110 THERAPEUTIC EXERCISES: CPT

## 2019-07-08 RX ORDER — DOXYCYCLINE 100 MG/1
100 CAPSULE ORAL EVERY 12 HOURS SCHEDULED
OUTPATIENT
Start: 2019-07-08

## 2019-07-08 RX ORDER — ATORVASTATIN CALCIUM 40 MG/1
40 TABLET, FILM COATED ORAL NIGHTLY
Qty: 30 TABLET | Refills: 3 | Status: SHIPPED | OUTPATIENT
Start: 2019-07-08 | End: 2019-11-07 | Stop reason: SDUPTHER

## 2019-07-08 RX ORDER — CLOPIDOGREL BISULFATE 75 MG/1
75 TABLET ORAL DAILY
Qty: 30 TABLET | Refills: 3 | Status: ON HOLD | OUTPATIENT
Start: 2019-07-08 | End: 2019-09-30 | Stop reason: SDUPTHER

## 2019-07-09 ENCOUNTER — TELEPHONE (OUTPATIENT)
Dept: ADMINISTRATIVE | Age: 54
End: 2019-07-09

## 2019-07-11 RX ORDER — LISINOPRIL 10 MG/1
10 TABLET ORAL DAILY
Qty: 30 TABLET | Refills: 3 | Status: SHIPPED | OUTPATIENT
Start: 2019-07-11 | End: 2019-07-18

## 2019-07-16 ENCOUNTER — OFFICE VISIT (OUTPATIENT)
Dept: NEUROLOGY | Age: 54
End: 2019-07-16

## 2019-07-16 ENCOUNTER — HOSPITAL ENCOUNTER (OUTPATIENT)
Dept: PHYSICAL THERAPY | Facility: CLINIC | Age: 54
Setting detail: THERAPIES SERIES
Discharge: HOME | End: 2019-07-16
Payer: MEDICAID

## 2019-07-16 VITALS
DIASTOLIC BLOOD PRESSURE: 65 MMHG | HEART RATE: 74 BPM | HEIGHT: 65 IN | OXYGEN SATURATION: 98 % | SYSTOLIC BLOOD PRESSURE: 130 MMHG | TEMPERATURE: 97.8 F | WEIGHT: 140.8 LBS | BODY MASS INDEX: 23.46 KG/M2

## 2019-07-16 DIAGNOSIS — I65.23 CAROTID STENOSIS, BILATERAL: ICD-10-CM

## 2019-07-16 DIAGNOSIS — E11.65 POORLY CONTROLLED DIABETES MELLITUS (HCC): ICD-10-CM

## 2019-07-16 DIAGNOSIS — Z87.39 HISTORY OF OSTEOMYELITIS: ICD-10-CM

## 2019-07-16 DIAGNOSIS — I63.9 ISCHEMIC STROKE (HCC): Primary | ICD-10-CM

## 2019-07-16 PROCEDURE — 99024 POSTOP FOLLOW-UP VISIT: CPT | Performed by: PSYCHIATRY & NEUROLOGY

## 2019-07-16 PROCEDURE — 97110 THERAPEUTIC EXERCISES: CPT

## 2019-07-16 ASSESSMENT — ENCOUNTER SYMPTOMS
RESPIRATORY NEGATIVE: 1
GASTROINTESTINAL NEGATIVE: 1
ALLERGIC/IMMUNOLOGIC NEGATIVE: 1
EYES NEGATIVE: 1

## 2019-07-16 NOTE — FLOWSHEET NOTE
[] [facility]) [facility] &  Therapy  [address]  [phone]  F: [phone] [x] [address]  [facility]   [address]  P: [phone]  F: [phone] [] [address]  Therapy  [address]  P: [phone]  F: [phone] [] [address]  [facility]   [address]  [place]: [phone]  F: [phone]     Physical Therapy Daily Treatment Note    Date:  2019  Patient Name:  Lorrie Hemphill    :  1965  MRN: [id number]  Physician: Madhu Varghese M.D                      Insurance: [facility], 30 visits   Medical Diagnosis:   Z95.1 (ICD-10-CM) - S/P CABG (coronary artery bypass graft)   M86.261 (ICD-10-CM) - Subacute osteomyelitis of right tibia [facility])   Rehab Codes: M25.661, M79.661, R26.2,M62.551,M62.561, M62.571  Onset Date: 19                                  Next 's appt: 19 with PCP, 7/3/19 with ortho  Visit# / total visits: 14  Cancels/No Shows: 0/0    Subjective:     Pain:  [x] Yes  [] No Location: R Foot Pain Rating: (0-10 scale) 3/10 intermittently   Pain altered Tx:  [] No  [] Yes  Action:  Comments: Patient states the heel is \"healing slowly\", has some pain d/t neuropathy in the R foot this date.      Objective:  Modalities:   Precautions:  Exercises:   Exercise Reps/ Time Weight/ Level Comments   NuStep 9 min L2          Supine:      Calf Stretch 3x30\"     SLR 20x 1#    SL Clamshells 20x red BLE   Hip Add 20x5\"     Bridges w/ band 20x red    4-way ankle  15x L A Able to complete 10x for DF/PF on R, min range           Side-lying      Hip Abd 20x 1# BLE   clamshells 20x red          Seated:      Heel slides w/towel 10x5\"     Heel raises 20x     Sit to stand 2x10           Standing:      Stairs 3x 8 in in hallway   Gait 2 laps SBQC 2 point gait pattern, not today     Celestine

## 2019-07-16 NOTE — PROGRESS NOTES
ATTENDING NOTE    I reviewed and discussed with the resident of his/her note, the documented findings and plan of care. I have personally seen and evaluated the patient and test results. I agree with the documentation except following modifications/additions:    53year old RHM came for post hospitalization follow up regarding his stroke. He was seen by neurology for consult by Neris De La Cruz and Dr. Tere Adames for not waking up post emergent CABGx4.     4/7-25/2019, pt was hospitalized at [facility]     Originally pt was admitted for SOB of several days, in ED, he went into PEA s/p CPR  and emergent CABGx4. He had multiple organ (heart, kidney, liver), brain damage, and a very complicated hospital course. Multiple teams involved in his care during that hospitalization, vascular for decreased pulse after surgery, ID for orthopedic hardware infected/osteomyelitis. Pulmonary for difficulty weaning off vent,. Nephrology for ALMITA, ortho for removing right tibia hardware, Neurology got head CT, EEG, carotid Doppler. EEG showed diffuse slow, CTH showed remote infarct/trauma (encephalomalacia in bifrontal and high left parietal); left lower parietal possible subacute infarct. MRI detected multiple acute ischemic infarcts in right brachium pontis, right cerebellar hemisphere and bilateral cerebral white matter in multiple vascular territories. Bilateral anterior frontal and high left frontoparietal encephalomalacia from previous insult. Left ICA absent flow void consistent with high grade proximal stenosis of occlusion. CTA head, neck showed left ICA and left VA occlusion; right ICA 60% stenosis; left common carotid 60-70% stenosis; neuroendo consulted for LU stenting but did not get done due to right leg infection. Patient was discharged to rehab, stayed there from 4.25 to 5/9. On 7/3/2019, he was admitted for elective right ICA angioplasty and stenting for right ICA, procedure went well.  Pt has been on ASA 81mg and Plavix compliant with his medications. Today he's no complaint. He's no residual deficits from the last stroke. PMH    has a past medical history of CAD (coronary artery disease), Cardiac arrest ([facility] Diabetes mellitus ([facility] Hyperlipidemia, Hypertension, Osteolysis, PEA (Pulseless electrical activity) ([facility] and PVD (peripheral vascular disease) ([facility] PSH/SH/FMH: Remain unchanged since last visit 7/2/2019 except those listed in the interval history. ALLERGIES:   No Known Allergies    MEDICATIONS:   Current Outpatient Medications   Medication Sig Dispense Refill    metoprolol tartrate (LOPRESSOR) 25 MG tablet Take 1 tablet by mouth 2 times daily 60 tablet 3    lisinopril (PRINIVIL;ZESTRIL) 10 MG tablet Take 1 tablet by mouth daily 30 tablet 3    atorvastatin (LIPITOR) 40 MG tablet Take 1 tablet by mouth nightly 30 tablet 3    clopidogrel (PLAVIX) 75 MG tablet Take 1 tablet by mouth daily 30 tablet 3    furosemide (LASIX) 20 MG tablet Take 1 tablet by mouth daily 60 tablet 3    metFORMIN (GLUCOPHAGE) 500 MG tablet Take 500 mg by mouth daily       Gauze Pads & Dressings (GAUZE DRESSING) 4\"X4\" PADS 1 each by Does not apply route daily 10 each 3    Gauze Pads & Dressings (KERLIX GAUZE ROLL MEDIUM) MISC 1 Package by Does not apply route daily for 7 days 10 each 3    traZODone (DESYREL) 50 MG tablet Take 1 tablet by mouth nightly 30 tablet 1    aspirin 81 MG chewable tablet Take 1 tablet by mouth daily 30 tablet 3    Multiple Vitamins-Minerals (THERAPEUTIC MULTIVITAMIN-MINERALS) tablet Take 1 tablet by mouth daily (with breakfast)      famotidine (PEPCID) 20 MG tablet Take 1 tablet by mouth 2 times daily 60 tablet 3    doxycycline monohydrate (MONODOX) 100 MG capsule Take 1 capsule by mouth every 12 hours (Patient not taking: Reported on 7/16/2019)       No current facility-administered medications for this visit.          PREVIOUS WORKUP:    IR Angiogram  Impression:   1.  Right proximal immediately  Bibasilar airspace   disease and atelectasis.  Chronic emphysematous changes. .  No cervical or   superior mediastinal lymphadenopathy.  The visualized portion of the larynx   and pharynx appear unremarkable.  The parotid, submandibular and thyroid   glands demonstrate no acute abnormality.       BONES: The visualized osseous structures demonstrate multilevel degenerative   changes. .           CTA HEAD:       ANTERIOR CIRCULATION: Right petrous segment, cavernous segments cervical   segments are patent.  Left petrous, cavernous segments are occluded. Reconstitution left carotid terminus likely due to combination of   extracranial and intracranial collateral flow.  The anterior cerebral and   middle cerebral arteries demonstrate no focal stenosis.       POSTERIOR CIRCULATION: Bilateral P comms.  The posterior cerebral arteries   demonstrate no focal stenosis. The vertebral and basilar arteries appear   unremarkable.       BRAIN: No mass effect or midline shift. No hyperdense extra-axial fluid   collection.  Chronic encephalomalacia left TOAN distribution involving both   inferior frontal lobes.  The gray-white differentiation appears grossly   maintained.  Air-fluid levels both sphenoid sinuses and posterior ethmoid air   cells.         LABS & TESTS:      Lab Results   Component Value Date    WBC 14.5 (H) 07/04/2019    HGB 11.8 (L) 07/04/2019    HCT 36.9 (L) 07/04/2019    MCV 88.9 07/04/2019     07/04/2019       REVIEW OF SYSTEMS:     Review of Systems   Constitutional: Negative. HENT: Negative. Eyes: Negative. Respiratory: Negative. Cardiovascular: Negative. Gastrointestinal: Negative. Musculoskeletal: Negative. Skin: Positive for wound. Allergic/Immunologic: Negative. Neurological: Negative. Hematological: Negative. Psychiatric/Behavioral: Negative.          VITALS  /65 (Site: Right Upper Arm, Position: Sitting, Cuff Size: Medium Adult)   Pulse 74   Temp 97.8 °F (36.6 asymptomatic at this time. Plan:   Continue aspirin 81 mg daily and plavix 75 mg daily  Will defer the duration dual anti-platelet therapy to endovascular neurosurgery team.  Lipid profile was reviewed. LDL of 22. Patient is off Lipitor     Mr. Pamela Bravo received counseling on the following healthy behaviors: medical compliance, smoking cessation, blood pressure control, regular follow up with primary doctor.         Electronically signed by Dennise White MD on 7/16/2019 at 2:56 PM

## 2019-07-18 ENCOUNTER — OFFICE VISIT (OUTPATIENT)
Dept: FAMILY MEDICINE CLINIC | Age: 54
End: 2019-07-18
Payer: MEDICAID

## 2019-07-18 VITALS
BODY MASS INDEX: 23.49 KG/M2 | WEIGHT: 141 LBS | HEART RATE: 80 BPM | HEIGHT: 65 IN | DIASTOLIC BLOOD PRESSURE: 64 MMHG | SYSTOLIC BLOOD PRESSURE: 97 MMHG

## 2019-07-18 DIAGNOSIS — Z11.4 ENCOUNTER FOR SCREENING FOR HIV: ICD-10-CM

## 2019-07-18 DIAGNOSIS — E11.40 TYPE 2 DIABETES MELLITUS WITH DIABETIC NEUROPATHY, WITHOUT LONG-TERM CURRENT USE OF INSULIN (HCC): Primary | ICD-10-CM

## 2019-07-18 DIAGNOSIS — Z95.1 STATUS POST AORTO-CORONARY ARTERY BYPASS GRAFT: ICD-10-CM

## 2019-07-18 DIAGNOSIS — Z12.11 COLON CANCER SCREENING: ICD-10-CM

## 2019-07-18 DIAGNOSIS — M21.371 RIGHT FOOT DROP: ICD-10-CM

## 2019-07-18 DIAGNOSIS — Z11.59 ENCOUNTER FOR HCV SCREENING TEST FOR LOW RISK PATIENT: ICD-10-CM

## 2019-07-18 LAB — HBA1C MFR BLD: 8.4 %

## 2019-07-18 PROCEDURE — 83036 HEMOGLOBIN GLYCOSYLATED A1C: CPT | Performed by: STUDENT IN AN ORGANIZED HEALTH CARE EDUCATION/TRAINING PROGRAM

## 2019-07-18 PROCEDURE — 99213 OFFICE O/P EST LOW 20 MIN: CPT | Performed by: STUDENT IN AN ORGANIZED HEALTH CARE EDUCATION/TRAINING PROGRAM

## 2019-07-18 PROCEDURE — G8427 DOCREV CUR MEDS BY ELIG CLIN: HCPCS | Performed by: STUDENT IN AN ORGANIZED HEALTH CARE EDUCATION/TRAINING PROGRAM

## 2019-07-18 PROCEDURE — 2022F DILAT RTA XM EVC RTNOPTHY: CPT | Performed by: STUDENT IN AN ORGANIZED HEALTH CARE EDUCATION/TRAINING PROGRAM

## 2019-07-18 PROCEDURE — G8420 CALC BMI NORM PARAMETERS: HCPCS | Performed by: STUDENT IN AN ORGANIZED HEALTH CARE EDUCATION/TRAINING PROGRAM

## 2019-07-18 PROCEDURE — 1111F DSCHRG MED/CURRENT MED MERGE: CPT | Performed by: STUDENT IN AN ORGANIZED HEALTH CARE EDUCATION/TRAINING PROGRAM

## 2019-07-18 PROCEDURE — G8598 ASA/ANTIPLAT THER USED: HCPCS | Performed by: STUDENT IN AN ORGANIZED HEALTH CARE EDUCATION/TRAINING PROGRAM

## 2019-07-18 PROCEDURE — 3017F COLORECTAL CA SCREEN DOC REV: CPT | Performed by: STUDENT IN AN ORGANIZED HEALTH CARE EDUCATION/TRAINING PROGRAM

## 2019-07-18 PROCEDURE — 1036F TOBACCO NON-USER: CPT | Performed by: STUDENT IN AN ORGANIZED HEALTH CARE EDUCATION/TRAINING PROGRAM

## 2019-07-18 PROCEDURE — 3045F PR MOST RECENT HEMOGLOBIN A1C LEVEL 7.0-9.0%: CPT | Performed by: STUDENT IN AN ORGANIZED HEALTH CARE EDUCATION/TRAINING PROGRAM

## 2019-07-18 RX ORDER — LISINOPRIL 5 MG/1
10 TABLET ORAL DAILY
Qty: 60 TABLET | Refills: 0 | Status: SHIPPED | OUTPATIENT
Start: 2019-07-18 | End: 2021-11-09

## 2019-07-18 ASSESSMENT — ENCOUNTER SYMPTOMS
VOMITING: 0
COUGH: 0
SHORTNESS OF BREATH: 0
DIARRHEA: 0
ABDOMINAL PAIN: 0
CONSTIPATION: 0
NAUSEA: 0

## 2019-07-18 NOTE — PROGRESS NOTES
Visit Information    Have you changed or started any medications since your last visit including any over-the-counter medicines, vitamins, or herbal medicines? no   Are you having any side effects from any of your medications? -  no  Have you stopped taking any of your medications? Is so, why? -  no    Have you seen any other physician or provider since your last visit? Yes - Records Obtained  Have you had any other diagnostic tests since your last visit? Yes - Records Obtained  Have you been seen in the emergency room and/or had an admission to a hospital since we last saw you? Yes - Records Obtained  Have you had your routine dental cleaning in the past 6 months? no    Have you activated your [employer] account? If not, what are your barriers?  Yes     Patient Care Team:  Gunnar Welsh MD as PCP - General (Family Medicine)    Medical History Review  Past Medical, Family, and Social History reviewed and does contribute to the patient presenting condition    Health Maintenance   Topic Date Due    Hepatitis C screen  1965    Pneumococcal 0-64 years Vaccine (1 of 1 - PPSV23) 06/08/1970    Diabetic foot exam  06/08/1974    Diabetic retinal exam  06/08/1974    HIV screen  06/08/1979    Diabetic microalbuminuria test  06/08/1982    Hepatitis B Vaccine (1 of 3 - Risk 3-dose series) 06/08/1983    Shingles Vaccine (1 of 2) 06/08/2014    Colon cancer screen colonoscopy  06/08/2014    A1C test (Diabetic or Prediabetic)  07/17/2019    Flu vaccine (1) 09/01/2019    Lipid screen  04/17/2020    Potassium monitoring  07/04/2020    Creatinine monitoring  07/04/2020    DTaP/Tdap/Td vaccine (2 - Td) 11/11/2025

## 2019-07-22 ENCOUNTER — HOSPITAL ENCOUNTER (OUTPATIENT)
Dept: CT IMAGING | Age: 54
Discharge: HOME | End: 2019-07-24
Payer: MEDICAID

## 2019-07-22 ENCOUNTER — HOSPITAL ENCOUNTER (OUTPATIENT)
Dept: VASCULAR LAB | Age: 54
Discharge: HOME | End: 2019-07-22
Payer: MEDICAID

## 2019-07-22 DIAGNOSIS — L89.610 PRESSURE INJURY OF RIGHT HEEL, UNSTAGEABLE (HCC): ICD-10-CM

## 2019-07-22 DIAGNOSIS — I65.21 RIGHT-SIDED EXTRACRANIAL CAROTID ARTERY STENOSIS: ICD-10-CM

## 2019-07-22 DIAGNOSIS — E11.42 DM TYPE 2 WITH DIABETIC PERIPHERAL NEUROPATHY (HCC): ICD-10-CM

## 2019-07-22 DIAGNOSIS — I73.9 PAD (PERIPHERAL ARTERY DISEASE) (HCC): ICD-10-CM

## 2019-07-22 PROCEDURE — 93923 UPR/LXTR ART STDY 3+ LVLS: CPT

## 2019-07-22 PROCEDURE — 6360000004 HC RX CONTRAST MEDICATION: Performed by: NURSE PRACTITIONER

## 2019-07-22 PROCEDURE — 70498 CT ANGIOGRAPHY NECK: CPT

## 2019-07-22 RX ADMIN — IOHEXOL 90 ML: 350 INJECTION, SOLUTION INTRAVENOUS at 09:56

## 2019-07-23 ENCOUNTER — HOSPITAL ENCOUNTER (OUTPATIENT)
Dept: PHYSICAL THERAPY | Facility: CLINIC | Age: 54
Setting detail: THERAPIES SERIES
Discharge: HOME | End: 2019-07-23
Payer: MEDICAID

## 2019-07-23 PROCEDURE — 97110 THERAPEUTIC EXERCISES: CPT

## 2019-07-30 ENCOUNTER — HOSPITAL ENCOUNTER (OUTPATIENT)
Dept: PHYSICAL THERAPY | Facility: CLINIC | Age: 54
Setting detail: THERAPIES SERIES
Discharge: HOME | End: 2019-07-30
Payer: MEDICAID

## 2019-08-13 ENCOUNTER — HOSPITAL ENCOUNTER (OUTPATIENT)
Dept: PHYSICAL THERAPY | Facility: CLINIC | Age: 54
Setting detail: THERAPIES SERIES
Discharge: HOME | End: 2019-08-13
Payer: MEDICAID

## 2019-08-13 PROCEDURE — 97110 THERAPEUTIC EXERCISES: CPT

## 2019-08-13 NOTE — FLOWSHEET NOTE
[] [facility]) - [facility] &  Therapy  [address]  [phone]  F: [phone] [x] [address]  [facility]   [address]  P: [phone]  F: [phone] [] [address]  Therapy  [facility]  P: [phone]  F: [phone] [] [address]  [facility]   [address]  [place]: [phone]  F: [phone]     Physical Therapy Daily Treatment Note    Date:  2019  Patient Name:  Sondra Chew    :  1965  MRN: [id number]  Physician: Greta Zamorano M.D                      Insurance: [facility], 30 visits   Medical Diagnosis:   Z95.1 (ICD-10-CM) - S/P CABG (coronary artery bypass graft)   M86.261 (ICD-10-CM) - Subacute osteomyelitis of right tibia [facility])   Rehab Codes: M25.661, M79.661, R26.2,M62.551,M62.561, M62.571  Onset Date: 19                                  Next 's appt:   Visit# / total visits:   Cancels/No Shows: 0/0    Subjective:     Pain:  [x] Yes  [] No Location: R Foot Pain Rating: (0-10 scale) 0/10  Pain altered Tx:  [] No  [] Yes  Action:    Comments: Patient denies pain, but c/o feeling tight in his R calf. Pt admits he has little sensation in his foot. Pt also states he has been doing more walking lately. Limited time for treatment this date d/t scheduling error.      Objective:  Modalities:   Precautions:  Exercises: bolded completed d/t time constraint   Exercise Reps/ Time Weight/ Level Comments   NuStep 5 min L2          Supine:      Calf Stretch 3x30\"  With strap   SLR 20x 1#    Hip Add 20x5\"     Bridges w/ band 20x blueberry    4-way ankle  15x  A Able to complete 10x for DF/PF on R, min range           Side-lying      Hip Abd 20x 1# BLE   clamshells 20x Blueberry          Seated:      Heel slides w/towel 10x5\"     Heel raises 20x     Sit to stand 2x10 improved community ambulation   5. Independent with Home Exercise Programs  6. Demonstrate Knowledge of fall prevention  LTG: (to be met in 14 treatments)  1. Pt to demonstrate grossly 4/5 R hip strength for improved ability to sit to stand, climb stairs, etc.   2. Pt will report 2 or less nightly sleep disturbances d/t pain for improved sleep quality  3. Pt to improve TUG time by 3 seconds to lower fall risk  4. Pt will report <55% impairment on LEFI to indicate improved functional mobility   5.      Pt. Education:  [] Yes  [x] No  [] Reviewed Prior HEP/Ed  Method of Education: [] Verbal  [] Demo  [] Written  Comprehension of Education:  [] Verbalizes understanding. [] Demonstrates understanding. [] Needs review. [] Demonstrates/verbalizes HEP/Ed previously given. Plan: [x] Continue per plan of care.    [] Other:      Time In: 3:20pm             Time Out: 3:55pm     Electronically signed by:  Carolyn Fitzgerald PTA

## 2019-08-14 ENCOUNTER — OFFICE VISIT (OUTPATIENT)
Dept: PODIATRY | Age: 54
End: 2019-08-14
Payer: MEDICAID

## 2019-08-14 VITALS
RESPIRATION RATE: 18 BRPM | BODY MASS INDEX: 24.03 KG/M2 | WEIGHT: 144.2 LBS | HEART RATE: 81 BPM | HEIGHT: 65 IN | DIASTOLIC BLOOD PRESSURE: 78 MMHG | SYSTOLIC BLOOD PRESSURE: 157 MMHG

## 2019-08-14 DIAGNOSIS — L97.413: Primary | ICD-10-CM

## 2019-08-14 DIAGNOSIS — L89.610 PRESSURE INJURY OF RIGHT HEEL, UNSTAGEABLE (HCC): ICD-10-CM

## 2019-08-14 DIAGNOSIS — E11.42 DM TYPE 2 WITH DIABETIC PERIPHERAL NEUROPATHY (HCC): ICD-10-CM

## 2019-08-14 DIAGNOSIS — I73.9 PAD (PERIPHERAL ARTERY DISEASE) (HCC): ICD-10-CM

## 2019-08-14 PROCEDURE — 1036F TOBACCO NON-USER: CPT | Performed by: STUDENT IN AN ORGANIZED HEALTH CARE EDUCATION/TRAINING PROGRAM

## 2019-08-14 PROCEDURE — G8427 DOCREV CUR MEDS BY ELIG CLIN: HCPCS | Performed by: STUDENT IN AN ORGANIZED HEALTH CARE EDUCATION/TRAINING PROGRAM

## 2019-08-14 PROCEDURE — 99213 OFFICE O/P EST LOW 20 MIN: CPT | Performed by: STUDENT IN AN ORGANIZED HEALTH CARE EDUCATION/TRAINING PROGRAM

## 2019-08-14 PROCEDURE — 11042 DBRDMT SUBQ TIS 1ST 20SQCM/<: CPT | Performed by: STUDENT IN AN ORGANIZED HEALTH CARE EDUCATION/TRAINING PROGRAM

## 2019-08-14 PROCEDURE — 2022F DILAT RTA XM EVC RTNOPTHY: CPT | Performed by: STUDENT IN AN ORGANIZED HEALTH CARE EDUCATION/TRAINING PROGRAM

## 2019-08-14 PROCEDURE — 3045F PR MOST RECENT HEMOGLOBIN A1C LEVEL 7.0-9.0%: CPT | Performed by: STUDENT IN AN ORGANIZED HEALTH CARE EDUCATION/TRAINING PROGRAM

## 2019-08-14 PROCEDURE — 3017F COLORECTAL CA SCREEN DOC REV: CPT | Performed by: STUDENT IN AN ORGANIZED HEALTH CARE EDUCATION/TRAINING PROGRAM

## 2019-08-14 PROCEDURE — G8420 CALC BMI NORM PARAMETERS: HCPCS | Performed by: STUDENT IN AN ORGANIZED HEALTH CARE EDUCATION/TRAINING PROGRAM

## 2019-08-14 PROCEDURE — G8598 ASA/ANTIPLAT THER USED: HCPCS | Performed by: STUDENT IN AN ORGANIZED HEALTH CARE EDUCATION/TRAINING PROGRAM

## 2019-08-14 PROCEDURE — 99212 OFFICE O/P EST SF 10 MIN: CPT | Performed by: STUDENT IN AN ORGANIZED HEALTH CARE EDUCATION/TRAINING PROGRAM

## 2019-08-14 NOTE — PROGRESS NOTES
Patient instructed to remove shoes and socks, instructed to sit in exam chair. Current PCP name is Lui Harris and date of last visit 6-28-19. Do you have a follow up visit scheduled?   Yes or no    If yes the date is NO

## 2019-08-14 NOTE — PROGRESS NOTES
[address]  [address] [place] [address],  S Biju Siddiqui  Tel: [phone]   Fax: [phone]    Subjective     CC: Right heel wound    HPI:  Rebecca Lawrence is a 53y.o. year old male who presents to clinic today for follow up of right heel wound. Patient has not followed up for 6 weeks in podiatry clinic. Patient states he has been doing dressing changes himself with betadine. Patient admits he is ambulating with a PRAFO boot on RLE and walker. Patient has not followed with vascular per patient. Denies any nauea, vomiting, fever, or chills currently. Interval history:  Patient states he was in the hospital for an extended period of time due to MI and stroke. He was diagnosed with being diabetic with a HbgA1c of 10.0. Patient underwent open heart surgery while admitted. He had infected hardware in his right knee and had it removed, but developed a subsequent drop foot. He was discharged to a SNF, but has since lost his apartment due to his drop foot and to losing his job. He states he is currently staying at different friend's houses. He states he ambulated with a RAFO boot. He is on long term doxycycline from Dr. Obie Woodruff. He states he he developed a right heel wound and has only been using a band-aid to the area. He admits to mild sanguinous drainage from the heel. He denies any nausea, vomiting, fever, chills, chest pain, SOB. Primary care physician is Maria Guadalupe Vann MD.    ROS:    Constitutional: Denies nausea, vomiting, fever, chills. Neurologic: Denies numbness, tingling, and burning in the feet. Vascular: Admits to claudication     Skin: Admits open wounds. Otherwise negative except as noted in the HPI.      PMH:  Past Medical History:   Diagnosis Date    CAD (coronary artery disease) 04/07/2019    Cardiac arrest ([facility] 04/07/2019    [facility] 04/2019    ON RX    Hyperlipidemia 04/2019    ON RX HAS NOT HAD SINCE 06/30/2019    Hypertension 04/2019    ON

## 2019-08-16 ENCOUNTER — INITIAL CONSULT (OUTPATIENT)
Dept: VASCULAR SURGERY | Age: 54
End: 2019-08-16
Payer: MEDICAID

## 2019-08-16 VITALS
BODY MASS INDEX: 23.82 KG/M2 | HEIGHT: 65 IN | HEART RATE: 87 BPM | OXYGEN SATURATION: 97 % | WEIGHT: 143 LBS | DIASTOLIC BLOOD PRESSURE: 77 MMHG | SYSTOLIC BLOOD PRESSURE: 138 MMHG | TEMPERATURE: 97.6 F

## 2019-08-16 DIAGNOSIS — I73.9 PAD (PERIPHERAL ARTERY DISEASE) (HCC): Primary | ICD-10-CM

## 2019-08-16 PROCEDURE — 99243 OFF/OP CNSLTJ NEW/EST LOW 30: CPT | Performed by: SURGERY

## 2019-08-16 PROCEDURE — G8420 CALC BMI NORM PARAMETERS: HCPCS | Performed by: SURGERY

## 2019-08-16 PROCEDURE — G8427 DOCREV CUR MEDS BY ELIG CLIN: HCPCS | Performed by: SURGERY

## 2019-08-18 ASSESSMENT — ENCOUNTER SYMPTOMS
GASTROINTESTINAL NEGATIVE: 1
ALLERGIC/IMMUNOLOGIC NEGATIVE: 1
SHORTNESS OF BREATH: 0
CHEST TIGHTNESS: 0
COLOR CHANGE: 1

## 2019-08-20 ENCOUNTER — HOSPITAL ENCOUNTER (INPATIENT)
Dept: CARDIAC CATH/INVASIVE PROCEDURES | Age: 54
LOS: 9 days | Discharge: NURSING, REHAB OR LONG-TERM CARE | DRG: 305 | End: 2019-08-29
Attending: SURGERY | Admitting: SURGERY
Payer: MEDICAID

## 2019-08-20 ENCOUNTER — APPOINTMENT (OUTPATIENT)
Dept: GENERAL RADIOLOGY | Age: 54
DRG: 305 | End: 2019-08-20
Attending: SURGERY
Payer: MEDICAID

## 2019-08-20 ENCOUNTER — HOSPITAL ENCOUNTER (OUTPATIENT)
Dept: PHYSICAL THERAPY | Facility: CLINIC | Age: 54
Setting detail: THERAPIES SERIES
Discharge: HOME | End: 2019-08-20
Payer: MEDICAID

## 2019-08-20 DIAGNOSIS — Z98.890 STATUS POST ANGIOGRAPHY OF EXTREMITY: ICD-10-CM

## 2019-08-20 DIAGNOSIS — G89.18 ACUTE POST-OPERATIVE PAIN: Primary | ICD-10-CM

## 2019-08-20 LAB
GFR NON-AFRICAN AMERICAN: >60 ML/MIN
GFR SERPL CREATININE-BSD FRML MDRD: >60 ML/MIN
GFR SERPL CREATININE-BSD FRML MDRD: ABNORMAL ML/MIN/{1.73_M2}
GLUCOSE BLD-MCNC: 240 MG/DL (ref 74–100)
POC CHLORIDE: 100 MMOL/L (ref 98–107)
POC CREATININE: 0.48 MG/DL (ref 0.51–1.19)
POC HEMATOCRIT: 40 % (ref 41–53)
POC HEMOGLOBIN: 13.8 G/DL (ref 13.5–17.5)
POC POTASSIUM: 4 MMOL/L (ref 3.5–4.5)
POC SODIUM: 135 MMOL/L (ref 138–146)

## 2019-08-20 PROCEDURE — 75710 ARTERY X-RAYS ARM/LEG: CPT | Performed by: SURGERY

## 2019-08-20 PROCEDURE — 73630 X-RAY EXAM OF FOOT: CPT

## 2019-08-20 PROCEDURE — C1894 INTRO/SHEATH, NON-LASER: HCPCS

## 2019-08-20 PROCEDURE — 6360000002 HC RX W HCPCS

## 2019-08-20 PROCEDURE — 36140 INTRO NDL ICATH UPR/LXTR ART: CPT | Performed by: SURGERY

## 2019-08-20 PROCEDURE — 6370000000 HC RX 637 (ALT 250 FOR IP): Performed by: STUDENT IN AN ORGANIZED HEALTH CARE EDUCATION/TRAINING PROGRAM

## 2019-08-20 PROCEDURE — 36245 INS CATH ABD/L-EXT ART 1ST: CPT | Performed by: SURGERY

## 2019-08-20 PROCEDURE — 85014 HEMATOCRIT: CPT

## 2019-08-20 PROCEDURE — 82565 ASSAY OF CREATININE: CPT

## 2019-08-20 PROCEDURE — 6360000002 HC RX W HCPCS: Performed by: STUDENT IN AN ORGANIZED HEALTH CARE EDUCATION/TRAINING PROGRAM

## 2019-08-20 PROCEDURE — 84295 ASSAY OF SERUM SODIUM: CPT

## 2019-08-20 PROCEDURE — 2709999900 HC NON-CHARGEABLE SUPPLY

## 2019-08-20 PROCEDURE — 2580000003 HC RX 258: Performed by: STUDENT IN AN ORGANIZED HEALTH CARE EDUCATION/TRAINING PROGRAM

## 2019-08-20 PROCEDURE — 6360000004 HC RX CONTRAST MEDICATION

## 2019-08-20 PROCEDURE — 82435 ASSAY OF BLOOD CHLORIDE: CPT

## 2019-08-20 PROCEDURE — 7100000011 HC PHASE II RECOVERY - ADDTL 15 MIN

## 2019-08-20 PROCEDURE — 75625 CONTRAST EXAM ABDOMINL AORTA: CPT | Performed by: SURGERY

## 2019-08-20 PROCEDURE — 2500000003 HC RX 250 WO HCPCS

## 2019-08-20 PROCEDURE — 7100000010 HC PHASE II RECOVERY - FIRST 15 MIN

## 2019-08-20 PROCEDURE — 1200000000 HC SEMI PRIVATE

## 2019-08-20 PROCEDURE — C1769 GUIDE WIRE: HCPCS

## 2019-08-20 PROCEDURE — 0JBQ0ZZ EXCISION OF RIGHT FOOT SUBCUTANEOUS TISSUE AND FASCIA, OPEN APPROACH: ICD-10-PCS | Performed by: PODIATRIST

## 2019-08-20 PROCEDURE — 84132 ASSAY OF SERUM POTASSIUM: CPT

## 2019-08-20 PROCEDURE — C1887 CATHETER, GUIDING: HCPCS

## 2019-08-20 PROCEDURE — 83036 HEMOGLOBIN GLYCOSYLATED A1C: CPT

## 2019-08-20 PROCEDURE — 76937 US GUIDE VASCULAR ACCESS: CPT | Performed by: SURGERY

## 2019-08-20 PROCEDURE — 36415 COLL VENOUS BLD VENIPUNCTURE: CPT

## 2019-08-20 PROCEDURE — 75716 ARTERY X-RAYS ARMS/LEGS: CPT | Performed by: SURGERY

## 2019-08-20 PROCEDURE — 82947 ASSAY GLUCOSE BLOOD QUANT: CPT

## 2019-08-20 PROCEDURE — 36200 PLACE CATHETER IN AORTA: CPT | Performed by: SURGERY

## 2019-08-20 PROCEDURE — B41D1ZZ FLUOROSCOPY OF AORTA AND BILATERAL LOWER EXTREMITY ARTERIES USING LOW OSMOLAR CONTRAST: ICD-10-PCS | Performed by: SURGERY

## 2019-08-20 RX ORDER — ASPIRIN 81 MG/1
81 TABLET, CHEWABLE ORAL DAILY
Status: DISCONTINUED | OUTPATIENT
Start: 2019-08-20 | End: 2019-08-29 | Stop reason: HOSPADM

## 2019-08-20 RX ORDER — ATORVASTATIN CALCIUM 40 MG/1
40 TABLET, FILM COATED ORAL NIGHTLY
Status: DISCONTINUED | OUTPATIENT
Start: 2019-08-20 | End: 2019-08-29 | Stop reason: HOSPADM

## 2019-08-20 RX ORDER — DEXTROSE MONOHYDRATE 50 MG/ML
100 INJECTION, SOLUTION INTRAVENOUS PRN
Status: DISCONTINUED | OUTPATIENT
Start: 2019-08-20 | End: 2019-08-29 | Stop reason: HOSPADM

## 2019-08-20 RX ORDER — VANCOMYCIN HYDROCHLORIDE 1 G/200ML
1000 INJECTION, SOLUTION INTRAVENOUS EVERY 12 HOURS
Status: DISCONTINUED | OUTPATIENT
Start: 2019-08-20 | End: 2019-08-21

## 2019-08-20 RX ORDER — NICOTINE POLACRILEX 4 MG
15 LOZENGE BUCCAL PRN
Status: DISCONTINUED | OUTPATIENT
Start: 2019-08-20 | End: 2019-08-29 | Stop reason: HOSPADM

## 2019-08-20 RX ORDER — FUROSEMIDE 20 MG/1
20 TABLET ORAL DAILY
Status: DISCONTINUED | OUTPATIENT
Start: 2019-08-20 | End: 2019-08-27

## 2019-08-20 RX ORDER — SODIUM CHLORIDE 9 MG/ML
INJECTION, SOLUTION INTRAVENOUS CONTINUOUS
Status: DISCONTINUED | OUTPATIENT
Start: 2019-08-20 | End: 2019-08-20

## 2019-08-20 RX ORDER — TRAZODONE HYDROCHLORIDE 50 MG/1
50 TABLET ORAL NIGHTLY
Status: DISCONTINUED | OUTPATIENT
Start: 2019-08-20 | End: 2019-08-29 | Stop reason: HOSPADM

## 2019-08-20 RX ORDER — FAMOTIDINE 20 MG/1
20 TABLET, FILM COATED ORAL 2 TIMES DAILY
Status: DISCONTINUED | OUTPATIENT
Start: 2019-08-20 | End: 2019-08-29 | Stop reason: HOSPADM

## 2019-08-20 RX ORDER — LISINOPRIL 10 MG/1
10 TABLET ORAL DAILY
Status: DISCONTINUED | OUTPATIENT
Start: 2019-08-20 | End: 2019-08-29 | Stop reason: HOSPADM

## 2019-08-20 RX ORDER — DEXTROSE MONOHYDRATE 25 G/50ML
12.5 INJECTION, SOLUTION INTRAVENOUS PRN
Status: DISCONTINUED | OUTPATIENT
Start: 2019-08-20 | End: 2019-08-29 | Stop reason: HOSPADM

## 2019-08-20 RX ADMIN — FAMOTIDINE 20 MG: 20 TABLET, FILM COATED ORAL at 21:42

## 2019-08-20 RX ADMIN — ASPIRIN 81 MG: 81 TABLET, CHEWABLE ORAL at 21:42

## 2019-08-20 RX ADMIN — LISINOPRIL 10 MG: 10 TABLET ORAL at 21:42

## 2019-08-20 RX ADMIN — INSULIN LISPRO 3 UNITS: 100 INJECTION, SOLUTION INTRAVENOUS; SUBCUTANEOUS at 22:11

## 2019-08-20 RX ADMIN — METOPROLOL TARTRATE 25 MG: 25 TABLET ORAL at 21:43

## 2019-08-20 RX ADMIN — SODIUM CHLORIDE: 9 INJECTION, SOLUTION INTRAVENOUS at 12:22

## 2019-08-20 RX ADMIN — FUROSEMIDE 20 MG: 20 TABLET ORAL at 21:42

## 2019-08-20 RX ADMIN — ATORVASTATIN CALCIUM 40 MG: 80 TABLET, FILM COATED ORAL at 21:43

## 2019-08-20 RX ADMIN — PIPERACILLIN AND TAZOBACTAM 3.38 G: 3; .375 INJECTION, POWDER, FOR SOLUTION INTRAVENOUS at 21:43

## 2019-08-20 RX ADMIN — TRAZODONE HYDROCHLORIDE 50 MG: 50 TABLET ORAL at 21:42

## 2019-08-20 ASSESSMENT — PAIN DESCRIPTION - LOCATION: LOCATION: FOOT

## 2019-08-20 ASSESSMENT — ENCOUNTER SYMPTOMS
DIARRHEA: 0
SHORTNESS OF BREATH: 0
COUGH: 0
WHEEZING: 0
NAUSEA: 0
COLOR CHANGE: 1
VOMITING: 0
SORE THROAT: 0

## 2019-08-20 ASSESSMENT — PAIN DESCRIPTION - ORIENTATION: ORIENTATION: RIGHT

## 2019-08-20 ASSESSMENT — PAIN DESCRIPTION - DESCRIPTORS: DESCRIPTORS: ACHING;SHARP;SHOOTING

## 2019-08-20 ASSESSMENT — PAIN SCALES - GENERAL: PAINLEVEL_OUTOF10: 4

## 2019-08-20 ASSESSMENT — PAIN DESCRIPTION - PAIN TYPE: TYPE: ACUTE PAIN;CHRONIC PAIN

## 2019-08-20 NOTE — PROGRESS NOTES
Patient admitted, consent signature verified and questions answered. Patient ready for procedure. Call light to reach with side rails up 2 of 2. Bilateral jitendra clipped. Friend at bedside with patient. History and physical needs updated.

## 2019-08-20 NOTE — H&P
20 MG tablet Take 1 tablet by mouth daily 60 tablet 3    metFORMIN (GLUCOPHAGE) 500 MG tablet Take 500 mg by mouth daily         traZODone (DESYREL) 50 MG tablet Take 1 tablet by mouth nightly 30 tablet 1    aspirin 81 MG chewable tablet Take 1 tablet by mouth daily 30 tablet 3    Multiple Vitamins-Minerals (THERAPEUTIC MULTIVITAMIN-MINERALS) tablet Take 1 tablet by mouth daily (with breakfast)        famotidine (PEPCID) 20 MG tablet Take 1 tablet by mouth 2 times daily 60 tablet 3      No current facility-administered medications for this visit.             Social History:      Tobacco:    reports that he quit smoking about 3 months ago. He has a 20.00 pack-year smoking history. He has never used smokeless tobacco.  Alcohol:      reports that he does not drink alcohol. Drug Use:  reports that he does not use drugs.     Review of Systems:      Review of Systems   Constitutional: Negative for chills and fever. HENT: Negative. Eyes: Negative for visual disturbance. Respiratory: Negative for chest tightness and shortness of breath. Cardiovascular: Negative for chest pain and leg swelling. Gastrointestinal: Negative. Endocrine: Negative. Genitourinary: Negative. Musculoskeletal: Negative. Skin: Positive for color change and wound. Allergic/Immunologic: Negative. Neurological: Negative for weakness and numbness. Hematological: Negative. Psychiatric/Behavioral: Negative.          Physical Exam:      Vitals:  /77 (Site: Right Upper Arm, Position: Sitting, Cuff Size: Medium Adult)   Pulse 87   Temp 97.6 °F (36.4 °C) (Oral)   Ht 5' 5\" (1.651 m)   Wt 143 lb (64.9 kg)   SpO2 97%   BMI 23.80 kg/m²      Physical Exam   Constitutional: He is oriented to person, place, and time. He appears well-developed and well-nourished. Eyes: Conjunctivae and EOM are normal.   Neck: Carotid bruit is not present. Cardiovascular: Normal rate and regular rhythm.    Pulses:       Radial pulses

## 2019-08-20 NOTE — PROGRESS NOTES
Spoke with Dr Chas Sims regarding need for patient to be admitted. Consult for [facility] placed. Writer and Dr Chas Sims spoke with Dr Abbe Spangler who accepts patient at this time for admission.

## 2019-08-20 NOTE — PROGRESS NOTES
Pt admitted into 2025 from [facility] after a failed Right leg angiogram.  MD here to see patient and do debridement at bedside.

## 2019-08-21 LAB
ESTIMATED AVERAGE GLUCOSE: 240 MG/DL
GLUCOSE BLD-MCNC: 186 MG/DL (ref 75–110)
GLUCOSE BLD-MCNC: 213 MG/DL (ref 75–110)
GLUCOSE BLD-MCNC: 240 MG/DL (ref 75–110)
GLUCOSE BLD-MCNC: 252 MG/DL (ref 75–110)
GLUCOSE BLD-MCNC: 290 MG/DL (ref 75–110)
HBA1C MFR BLD: 10 % (ref 4–6)

## 2019-08-21 PROCEDURE — 6370000000 HC RX 637 (ALT 250 FOR IP): Performed by: STUDENT IN AN ORGANIZED HEALTH CARE EDUCATION/TRAINING PROGRAM

## 2019-08-21 PROCEDURE — 1200000000 HC SEMI PRIVATE

## 2019-08-21 PROCEDURE — 6360000002 HC RX W HCPCS: Performed by: STUDENT IN AN ORGANIZED HEALTH CARE EDUCATION/TRAINING PROGRAM

## 2019-08-21 PROCEDURE — 99232 SBSQ HOSP IP/OBS MODERATE 35: CPT | Performed by: SURGERY

## 2019-08-21 PROCEDURE — 93005 ELECTROCARDIOGRAM TRACING: CPT | Performed by: SURGERY

## 2019-08-21 PROCEDURE — 2580000003 HC RX 258: Performed by: STUDENT IN AN ORGANIZED HEALTH CARE EDUCATION/TRAINING PROGRAM

## 2019-08-21 PROCEDURE — 76937 US GUIDE VASCULAR ACCESS: CPT

## 2019-08-21 PROCEDURE — 99254 IP/OBS CNSLTJ NEW/EST MOD 60: CPT | Performed by: INTERNAL MEDICINE

## 2019-08-21 RX ORDER — ACETAMINOPHEN 500 MG
1000 TABLET ORAL EVERY 8 HOURS PRN
Status: DISCONTINUED | OUTPATIENT
Start: 2019-08-21 | End: 2019-08-26

## 2019-08-21 RX ADMIN — METOPROLOL TARTRATE 25 MG: 25 TABLET ORAL at 08:50

## 2019-08-21 RX ADMIN — PIPERACILLIN AND TAZOBACTAM 3.38 G: 3; .375 INJECTION, POWDER, FOR SOLUTION INTRAVENOUS at 12:36

## 2019-08-21 RX ADMIN — LISINOPRIL 10 MG: 10 TABLET ORAL at 08:49

## 2019-08-21 RX ADMIN — FUROSEMIDE 20 MG: 20 TABLET ORAL at 08:50

## 2019-08-21 RX ADMIN — TRAZODONE HYDROCHLORIDE 50 MG: 50 TABLET ORAL at 20:55

## 2019-08-21 RX ADMIN — VANCOMYCIN HYDROCHLORIDE 1000 MG: 1 INJECTION, SOLUTION INTRAVENOUS at 02:26

## 2019-08-21 RX ADMIN — PIPERACILLIN AND TAZOBACTAM 3.38 G: 3; .375 INJECTION, POWDER, FOR SOLUTION INTRAVENOUS at 03:51

## 2019-08-21 RX ADMIN — INSULIN LISPRO 5 UNITS: 100 INJECTION, SOLUTION INTRAVENOUS; SUBCUTANEOUS at 21:00

## 2019-08-21 RX ADMIN — INSULIN LISPRO 3 UNITS: 100 INJECTION, SOLUTION INTRAVENOUS; SUBCUTANEOUS at 17:41

## 2019-08-21 RX ADMIN — VANCOMYCIN HYDROCHLORIDE 1000 MG: 1 INJECTION, SOLUTION INTRAVENOUS at 14:58

## 2019-08-21 RX ADMIN — ACETAMINOPHEN 1000 MG: 500 TABLET ORAL at 22:25

## 2019-08-21 RX ADMIN — ATORVASTATIN CALCIUM 40 MG: 80 TABLET, FILM COATED ORAL at 20:55

## 2019-08-21 RX ADMIN — ENOXAPARIN SODIUM 40 MG: 40 INJECTION SUBCUTANEOUS at 08:49

## 2019-08-21 RX ADMIN — ASPIRIN 81 MG: 81 TABLET, CHEWABLE ORAL at 08:50

## 2019-08-21 RX ADMIN — INSULIN LISPRO 9 UNITS: 100 INJECTION, SOLUTION INTRAVENOUS; SUBCUTANEOUS at 12:29

## 2019-08-21 RX ADMIN — PIPERACILLIN AND TAZOBACTAM 3.38 G: 3; .375 INJECTION, POWDER, FOR SOLUTION INTRAVENOUS at 20:59

## 2019-08-21 RX ADMIN — FAMOTIDINE 20 MG: 20 TABLET, FILM COATED ORAL at 08:50

## 2019-08-21 RX ADMIN — FAMOTIDINE 20 MG: 20 TABLET, FILM COATED ORAL at 20:55

## 2019-08-21 RX ADMIN — INSULIN LISPRO 6 UNITS: 100 INJECTION, SOLUTION INTRAVENOUS; SUBCUTANEOUS at 10:22

## 2019-08-21 RX ADMIN — METOPROLOL TARTRATE 25 MG: 25 TABLET ORAL at 20:55

## 2019-08-21 ASSESSMENT — PAIN SCALES - GENERAL
PAINLEVEL_OUTOF10: 2
PAINLEVEL_OUTOF10: 4
PAINLEVEL_OUTOF10: 2
PAINLEVEL_OUTOF10: 2

## 2019-08-21 ASSESSMENT — PAIN DESCRIPTION - ORIENTATION: ORIENTATION: RIGHT

## 2019-08-21 ASSESSMENT — PAIN DESCRIPTION - PAIN TYPE: TYPE: ACUTE PAIN;CHRONIC PAIN

## 2019-08-21 ASSESSMENT — PAIN DESCRIPTION - LOCATION: LOCATION: FOOT

## 2019-08-21 NOTE — CONSULTS
50 MG tablet Take 1 tablet by mouth nightly 6/19/19  Yes Steven Allen MD   aspirin 81 MG chewable tablet Take 1 tablet by mouth daily 5/10/19  Yes Brian Poole MD   Multiple Vitamins-Minerals (THERAPEUTIC MULTIVITAMIN-MINERALS) tablet Take 1 tablet by mouth daily (with breakfast) 5/10/19  Yes Brian Poole MD   famotidine (PEPCID) 20 MG tablet Take 1 tablet by mouth 2 times daily 5/9/19  Yes Brian Poole MD      Current Facility-Administered Medications: sodium hypochlorite (DAKINS) external solution, , Irrigation, Daily  enoxaparin (LOVENOX) injection 40 mg, 40 mg, Subcutaneous, Daily  vancomycin (VANCOCIN) 1000 mg in dextrose 5% 200 mL IVPB, 1,000 mg, Intravenous, Q12H  piperacillin-tazobactam (ZOSYN) 3.375 g in dextrose 5 % 50 mL IVPB (mini-bag), 3.375 g, Intravenous, Q8H  aspirin chewable tablet 81 mg, 81 mg, Oral, Daily  atorvastatin (LIPITOR) tablet 40 mg, 40 mg, Oral, Nightly  famotidine (PEPCID) tablet 20 mg, 20 mg, Oral, BID  furosemide (LASIX) tablet 20 mg, 20 mg, Oral, Daily  lisinopril (PRINIVIL;ZESTRIL) tablet 10 mg, 10 mg, Oral, Daily  metoprolol tartrate (LOPRESSOR) tablet 25 mg, 25 mg, Oral, BID  traZODone (DESYREL) tablet 50 mg, 50 mg, Oral, Nightly  glucose (GLUTOSE) 40 % oral gel 15 g, 15 g, Oral, PRN  dextrose 50 % IV solution, 12.5 g, Intravenous, PRN  glucagon (rDNA) injection 1 mg, 1 mg, Intramuscular, PRN  dextrose 5 % solution, 100 mL/hr, Intravenous, PRN  insulin lispro (HUMALOG) injection vial 0-18 Units, 0-18 Units, Subcutaneous, TID WC  insulin lispro (HUMALOG) injection vial 0-9 Units, 0-9 Units, Subcutaneous, Nightly  vancomycin (VANCOCIN) intermittent dosing (placeholder), , Other, RX Placeholder    Allergies:  Patient has no known allergies. Social History:   reports that he quit smoking about 4 months ago. He has a 20.00 pack-year smoking history. He has never used smokeless tobacco. He reports that he does not drink alcohol or use drugs.      Family History:

## 2019-08-21 NOTE — PROGRESS NOTES
1. [place] grade III ulcer, R heel  2. Osteomyelitis  3. Severe PAD  4. S/p R Angiogram (DOS: 8/20/19)  5. 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 *    Plan   · Patient examined and evaluated at bedside  · Treatment options discussed in detail with the patient  · Medical management per primary - IV Vanc/Zosyn  · R foot XR - osteo posterior plantar calc  · Dressing applied to Right foot: Saline WTD, Kerlix, light ACE  · Rooke boot ordered to offload RLE  · NWB to RLE  · Vascular Surgery planning for RLE open aortobifemoral & fem-pop bypass after medical optimization & cardiac risk stratification  · Pending above will tentatively plan for partial calcanectomy, TBD  · F/u MRI to determine extent of calc osteo  · Discussed with Dr. Katerina Isbell DPM   Podiatric Medicine & Surgery   8/21/2019 at 9:02 AM     Senior Resident Addendum:    Pt seen/examined. Agree w/ above assesment/plan. F/u MRI & plan for possible calcanectomy post-revasc next week.     Benjy Kate DPM PGY3

## 2019-08-21 NOTE — CONSULTS
[facility] - Initial Consult Note  Today's Date and Time: 8/21/2019, 4:09 PM    Impression :   · Rt calcaneal osteomyelitis  · Rt calcaneal stage IV decubitus wound  · Severe PAD  · CAD s/p CABG x 4 (4/2019)  · DM II  · Peripheral neuropathy  · History of CVA    Recommendations:   · Continue Zosyn 3.375 gm IV q 8 hours  · Stop Vancomcyin  · Continue wound care with Dakins dressing changes BID  · Supportive care  · Revascularization procedure at the discretion of Vascular service    Medical Decision Making/Summary/Discussion:8/21/2019     · 54 yo gentleman who initially presented in April 2019 with chest pain  · Suffered cardiac arrest x 3, had an emergent CABG x 4 4-7-19  · Was found to have a protruding Rt tibial orthopedic screw from prior ORIF of tibial fracture without overt infection of the leg  · Then developed fevers, started on Vancomycin, developed ALMITA, transitioned to Ceftaroline with improved renal function  · MRI showed occlusion Lt carotid artery and multiple infracts  · Leg hardware was removed 4-22-19, pt transitioned to Doxycycline, with plan for chronic suppressive therapy. · Pt was discharged to SNF and did not follow up in office. · 8-14 presented to podiatry clinic after not being seen for 6 weeks. He was noted to have foul smelling drainage from the wound, and underwent debridement of the wound at the clinic and was referred to vascular for further work up. · 8-16 Was seen by Dr Frankey Spates who scheduled him for an angiogram for 8-20  · 8-20 Pt admitted and started on Vancomycin and Zosyn. Angiogram completed and showed severe aortoiliac occlusive disease, bilateral profunda stenosis, right SFA occlusion with reconstitution of above knee popliteal artery with 3 vessel runoff, left CFA stenosis SFA/popliteal patent with 3 vessel runoff.   · Plan for open aortobifemoral bypass and fem-pop on the right after plavix is out of pts system    Infection Control

## 2019-08-22 ENCOUNTER — APPOINTMENT (OUTPATIENT)
Dept: MRI IMAGING | Age: 54
DRG: 305 | End: 2019-08-22
Attending: SURGERY
Payer: MEDICAID

## 2019-08-22 LAB
C-REACTIVE PROTEIN: 95.5 MG/L (ref 0–5)
GLUCOSE BLD-MCNC: 124 MG/DL (ref 75–110)
GLUCOSE BLD-MCNC: 197 MG/DL (ref 75–110)
GLUCOSE BLD-MCNC: 203 MG/DL (ref 75–110)
GLUCOSE BLD-MCNC: 223 MG/DL (ref 75–110)
GLUCOSE BLD-MCNC: 295 MG/DL (ref 75–110)
HCT VFR BLD CALC: 36.9 % (ref 40.7–50.3)
HEMOGLOBIN: 11.4 G/DL (ref 13–17)
MCH RBC QN AUTO: 27.6 PG (ref 25.2–33.5)
MCHC RBC AUTO-ENTMCNC: 30.9 G/DL (ref 28.4–34.8)
MCV RBC AUTO: 89.3 FL (ref 82.6–102.9)
NRBC AUTOMATED: 0 PER 100 WBC
PDW BLD-RTO: 12.8 % (ref 11.8–14.4)
PLATELET # BLD: 456 K/UL (ref 138–453)
PMV BLD AUTO: 9 FL (ref 8.1–13.5)
RBC # BLD: 4.13 M/UL (ref 4.21–5.77)
SEDIMENTATION RATE, ERYTHROCYTE: 55 MM (ref 0–10)
VANCOMYCIN TROUGH DATE LAST DOSE: ABNORMAL
VANCOMYCIN TROUGH DOSE AMOUNT: ABNORMAL
VANCOMYCIN TROUGH TIME LAST DOSE: ABNORMAL
VANCOMYCIN TROUGH: <4 UG/ML (ref 10–20)
WBC # BLD: 15.8 K/UL (ref 3.5–11.3)

## 2019-08-22 PROCEDURE — 82947 ASSAY GLUCOSE BLOOD QUANT: CPT

## 2019-08-22 PROCEDURE — 36415 COLL VENOUS BLD VENIPUNCTURE: CPT

## 2019-08-22 PROCEDURE — 93308 TTE F-UP OR LMTD: CPT

## 2019-08-22 PROCEDURE — 99232 SBSQ HOSP IP/OBS MODERATE 35: CPT | Performed by: INTERNAL MEDICINE

## 2019-08-22 PROCEDURE — 2580000003 HC RX 258: Performed by: STUDENT IN AN ORGANIZED HEALTH CARE EDUCATION/TRAINING PROGRAM

## 2019-08-22 PROCEDURE — 1200000000 HC SEMI PRIVATE

## 2019-08-22 PROCEDURE — 97162 PT EVAL MOD COMPLEX 30 MIN: CPT

## 2019-08-22 PROCEDURE — 97166 OT EVAL MOD COMPLEX 45 MIN: CPT

## 2019-08-22 PROCEDURE — 86140 C-REACTIVE PROTEIN: CPT

## 2019-08-22 PROCEDURE — 80202 ASSAY OF VANCOMYCIN: CPT

## 2019-08-22 PROCEDURE — 6370000000 HC RX 637 (ALT 250 FOR IP): Performed by: STUDENT IN AN ORGANIZED HEALTH CARE EDUCATION/TRAINING PROGRAM

## 2019-08-22 PROCEDURE — 6360000002 HC RX W HCPCS: Performed by: STUDENT IN AN ORGANIZED HEALTH CARE EDUCATION/TRAINING PROGRAM

## 2019-08-22 PROCEDURE — 85027 COMPLETE CBC AUTOMATED: CPT

## 2019-08-22 PROCEDURE — 97535 SELF CARE MNGMENT TRAINING: CPT

## 2019-08-22 PROCEDURE — 85651 RBC SED RATE NONAUTOMATED: CPT

## 2019-08-22 PROCEDURE — 73721 MRI JNT OF LWR EXTRE W/O DYE: CPT

## 2019-08-22 PROCEDURE — 99232 SBSQ HOSP IP/OBS MODERATE 35: CPT | Performed by: SURGERY

## 2019-08-22 RX ORDER — INSULIN GLARGINE 100 [IU]/ML
10 INJECTION, SOLUTION SUBCUTANEOUS DAILY
Status: DISCONTINUED | OUTPATIENT
Start: 2019-08-22 | End: 2019-08-23

## 2019-08-22 RX ADMIN — ACETAMINOPHEN 1000 MG: 500 TABLET ORAL at 16:06

## 2019-08-22 RX ADMIN — INSULIN GLARGINE 10 UNITS: 100 INJECTION, SOLUTION SUBCUTANEOUS at 12:15

## 2019-08-22 RX ADMIN — PIPERACILLIN AND TAZOBACTAM 3.38 G: 3; .375 INJECTION, POWDER, FOR SOLUTION INTRAVENOUS at 04:24

## 2019-08-22 RX ADMIN — ENOXAPARIN SODIUM 40 MG: 40 INJECTION SUBCUTANEOUS at 08:51

## 2019-08-22 RX ADMIN — LISINOPRIL 10 MG: 10 TABLET ORAL at 08:51

## 2019-08-22 RX ADMIN — INSULIN LISPRO 3 UNITS: 100 INJECTION, SOLUTION INTRAVENOUS; SUBCUTANEOUS at 16:00

## 2019-08-22 RX ADMIN — FAMOTIDINE 20 MG: 20 TABLET, FILM COATED ORAL at 20:56

## 2019-08-22 RX ADMIN — INSULIN LISPRO 9 UNITS: 100 INJECTION, SOLUTION INTRAVENOUS; SUBCUTANEOUS at 12:15

## 2019-08-22 RX ADMIN — METOPROLOL TARTRATE 25 MG: 25 TABLET ORAL at 08:51

## 2019-08-22 RX ADMIN — PIPERACILLIN AND TAZOBACTAM 3.38 G: 3; .375 INJECTION, POWDER, FOR SOLUTION INTRAVENOUS at 14:00

## 2019-08-22 RX ADMIN — INSULIN LISPRO 6 UNITS: 100 INJECTION, SOLUTION INTRAVENOUS; SUBCUTANEOUS at 08:44

## 2019-08-22 RX ADMIN — PIPERACILLIN AND TAZOBACTAM 3.38 G: 3; .375 INJECTION, POWDER, FOR SOLUTION INTRAVENOUS at 20:57

## 2019-08-22 RX ADMIN — ASPIRIN 81 MG: 81 TABLET, CHEWABLE ORAL at 08:51

## 2019-08-22 RX ADMIN — FUROSEMIDE 20 MG: 20 TABLET ORAL at 08:51

## 2019-08-22 RX ADMIN — FAMOTIDINE 20 MG: 20 TABLET, FILM COATED ORAL at 08:50

## 2019-08-22 RX ADMIN — ATORVASTATIN CALCIUM 40 MG: 80 TABLET, FILM COATED ORAL at 20:57

## 2019-08-22 RX ADMIN — INSULIN LISPRO 6 UNITS: 100 INJECTION, SOLUTION INTRAVENOUS; SUBCUTANEOUS at 21:35

## 2019-08-22 RX ADMIN — TRAZODONE HYDROCHLORIDE 50 MG: 50 TABLET ORAL at 20:56

## 2019-08-22 RX ADMIN — DAKIN'S SOLUTION 0.125% (QUARTER STRENGTH): 0.12 SOLUTION at 08:53

## 2019-08-22 RX ADMIN — METOPROLOL TARTRATE 25 MG: 25 TABLET ORAL at 20:56

## 2019-08-22 ASSESSMENT — PAIN DESCRIPTION - LOCATION
LOCATION: FOOT
LOCATION: HEAD
LOCATION: FOOT

## 2019-08-22 ASSESSMENT — PAIN DESCRIPTION - DESCRIPTORS
DESCRIPTORS: HEADACHE
DESCRIPTORS: DISCOMFORT

## 2019-08-22 ASSESSMENT — PAIN SCALES - GENERAL
PAINLEVEL_OUTOF10: 2
PAINLEVEL_OUTOF10: 0
PAINLEVEL_OUTOF10: 4

## 2019-08-22 ASSESSMENT — PAIN DESCRIPTION - ORIENTATION
ORIENTATION: RIGHT
ORIENTATION: RIGHT

## 2019-08-22 ASSESSMENT — PAIN DESCRIPTION - PAIN TYPE
TYPE: ACUTE PAIN
TYPE: ACUTE PAIN
TYPE: ACUTE PAIN;CHRONIC PAIN

## 2019-08-22 NOTE — PROGRESS NOTES
AAO    Patient was seen and examined at bedside today. Patient states no acute events overnight. He notes that cardiology would like to get an echo on him prior to any surgery. Vascular surgery planning for right lower extremity open aortobifemoral and femoropopliteal bypass depending on preserved LVEF 40% on echo. Dependent on ECHO and vascular status, podiatry will tentatively plan for right partial calcanectomy with Intra-Op bone biopsy of the talus after pre-vascular procedure. He denies chills, fevers, night sweats or malaise. MRI of the right ankle 8/22/2019: Osteomyelitis of the calcaneal tuberosity and likely within the talus. Edema within the midfoot bones equivocal for osteomyelitis versus reactive edema related to Charcot arthropathy. Mild tenosynovitis of the peroneal tendons without significant fluid at the level of the malleoli. Partial tear of the flexor digitorum tendon with mild tenosynovitis. On exam his Rt heel wound is malodorous with thick tan drainage  There is a very thin layer of tissue over the bone with necrosis at the edges of the wound:      He is also noted to have an area of eschar over his Rt achilles. HRR, Lungs are clear. TMax: 99.4  VS: HR-103, BS: 295    Labs, X rays reviewed: 8/22/2019    BUN:  Cr: 0.48    WBC:15.8  Hb: 13.8-->11.4  Plat: 456    CRP: 95.5    Cultures:  Leg tissue:  · 4-22-19 Coag neg staph  Blood:  ·   Sputum :  ·   Wound:  ·     Discussed with patient, RN. I have personally reviewed the past medical history, past surgical history, medications, social history, and family history, and I have updated the database accordingly.   Past Medical History:     Past Medical History:   Diagnosis Date    CAD (coronary artery disease) 04/07/2019    Cardiac arrest ([facility] [address] 04/07/2019    Diabetes mellitus ([facility] [address] 04/2019    ON RX    Hyperlipidemia 04/2019    ON RX HAS NOT HAD SINCE 06/30/2019    Hypertension 04/2019    ON RX NONE SINCE 06/2019    laboratory data, other diagnostic studies and discussed them with the residents. I have updated the medical record where necessary. I agree with the assessment, plan and orders as documented by the resident.     Brigitte Flores MD.      Pager: [phone] - Office: [phone]

## 2019-08-22 NOTE — PROGRESS NOTES
Occupational Therapy   Occupational Therapy Initial Assessment  Date: 2019   Patient Name: Britney Carpenter  MRN: [id number]     : 1965    Date of Service: 2019    Discharge Recommendations:  Further therapy recommended at discharge. Copied from H&P:  62-year-old gentleman with severe peripheral arterial disease with critical limb ischemia on the right side with chronic right heel ulcer with concern for osteomyelitis with  active inflammation of calcaneus. Was admitted and had an angiogram to evaluate his circulation with possible intervention. Also due to concern for osteomyelitis was being evaluated for possible debridement and drainage as per podiatry. Vascular on consult and angiogram was performed showing an occluded common iliac with extensive plaque in the common and external iliac arteries with severe stenosis of profunda femoris and moderate stenosis of common femoral.  SFA was patent. Left lower extremity angiogram with no intervention given the significant aortoiliac occlusive disease. As per vascular recommendations patient will need operative exploration with likely an aortobifemoral bypass and right femoral-popliteal bypass. Assessment   Performance deficits / Impairments: Decreased functional mobility ; Decreased ADL status; Decreased high-level IADLs  Assessment: Pt would benefit acute care and post acute care OT in order to address deficits in functional mobility and ADL/IADL completion. Pt NWB status limits pt ability to complete functional mobility/tranfers and ADL/IADL/functional activities in safe and IND manner. Not adhering to NWB status during functional mobility/tranfers and ADL/IADL/functional activities put pt at risk for furhter injury. Prognosis: Good  Decision Making: Medium Complexity  OT Education: Plan of Care;OT Role;Transfer Training;IADL Safety  Patient Education: OTPOC, safety, NWB status tech.   Good return  REQUIRES OT FOLLOW UP: Yes  Activity

## 2019-08-22 NOTE — PROGRESS NOTES
94 %    I/O last 3 completed shifts: In: 7868 [P.O.:1360; I.V.:223]  Out: 3950 [Urine:3950]    CBC:  Recent Labs     08/22/19  1028   WBC 15.8*   HGB 11.4*   HCT 36.9*   *        BMP:  Recent Labs     08/20/19  1221   CREATININE 0.48*        Coags:  No results for input(s): APTT, PROT, INR in the last 72 hours. No results found for: SEDRATE  No results for input(s): CRP in the last 72 hours. Lower Extremity Physical Exam:  Vascular: DP and PT pulses are non-palpable, but dopplerable. CFT <5 seconds to all digits. Hair growth is absent to the level of the digits. Minimal edema.       Neuro: Saph/sural/SP/DP/plantar sensation diminished to light touch.     Musculoskeletal: Muscle strength is 4/5 to all lower extremity muscle groups. Gross deformity is absent.     Dermatologic: Full thickness ulcer #1 located R postero-lateral heel and measures approximately 5.0cm x 4.0cm x 1.0cm. Base is necrotic. Periwound skin is slightly hyperkeratotic. Minimal drainage noted with associated mal odor. Minimal erythema to the rad-wound with no associated increase in warmth. Wound probes to calcaneus. No fluctuance, crepitus, or induration.      Small 1.0cm x 0.8cm circumscribed eschar noted to the distal Achilles tendon. No purulence or active bleeding. Clinical:          Imaging:   MRI ANKLE RIGHT WO CONTRAST   Preliminary Result   Osteomyelitis of the calcaneal tuberosity and likely within the talus. Edema within the midfoot bones equivocal for osteomyelitis versus reactive   edema related to Charcot arthropathy. Mild tenosynovitis of the peroneal tendons without significant fluid at the   level of the malleoli. Partial tear of the flexor digitorum tendon with mild tenosynovitis. XR FOOT RIGHT (MIN 3 VIEWS)   Final Result   Cortical irregularity and osseous lucency in the posterior plantar calcaneus   is highly suspicious for acute osteomyelitis.   These findings are new from   the prior

## 2019-08-22 NOTE — PROGRESS NOTES
[facility]  Progress Note                   Date:   8/22/2019  Patient name: Rachel Cordero  Date of admission:  8/20/2019  6:34 PM  MRN:   [id number]  YOB: 1965  PCP: Steven Allen MD    Reason for Admission: Abnormal angiogram [R93.89]  Status post angiography of extremity [Z98.890]    Subjective:       Clinical Changes /Abnormalities: Pt. Seen & examined in room. No new CV issues overnight. Had MRI in middle of night and states didn't get any sleep. Awaiting OR. Echo not yet completed. Tele reviewed- SR. Denies CP or SOB    Review of Systems    Medications:   Scheduled Meds:   insulin glargine  10 Units Subcutaneous Daily    insulin lispro  0-18 Units Subcutaneous Q4H    sodium hypochlorite   Irrigation Daily    enoxaparin  40 mg Subcutaneous Daily    piperacillin-tazobactam  3.375 g Intravenous Q8H    aspirin  81 mg Oral Daily    atorvastatin  40 mg Oral Nightly    famotidine  20 mg Oral BID    furosemide  20 mg Oral Daily    lisinopril  10 mg Oral Daily    metoprolol tartrate  25 mg Oral BID    traZODone  50 mg Oral Nightly    insulin lispro  0-9 Units Subcutaneous Nightly     Continuous Infusions:   dextrose       CBC: No results for input(s): WBC, HGB, PLT in the last 72 hours. BMP:    Recent Labs     08/20/19  1221   CREATININE 0.48*     Hepatic:No results for input(s): AST, ALT, ALB, BILITOT, ALKPHOS in the last 72 hours. Troponin: No results for input(s): TROPHS in the last 72 hours. BNP: No results for input(s): BNP in the last 72 hours. Lipids: No results for input(s): CHOL, HDL in the last 72 hours. Invalid input(s): LDLCALCU  INR: No results for input(s): INR in the last 72 hours.     Objective:   Vitals: BP (!) 149/67   Pulse 103   Temp 99.4 °F (37.4 °C) (Oral)   Resp 15   Ht 5' 5\" (1.651 m)   Wt 141 lb 6.4 oz (64.1 kg)   SpO2 94%   BMI 23.53 kg/m²   General appearance: alert and cooperative with exam  HEENT: Head: Normocephalic, no lesions,

## 2019-08-22 NOTE — PROGRESS NOTES
shower, Built-in shower seat  Home Equipment: 4 wheeled walker, Rolling walker(Uses RW walker all the time)  Receives Help From: Family, Friend(s)  ADL Assistance: Independent  Homemaking Assistance: Independent  Homemaking Responsibilities: Yes  Meal Prep Responsibility: Secondary  Laundry Responsibility: Secondary  Cleaning Responsibility: Secondary  Shopping Responsibility: Secondary  Ambulation Assistance: Independent  Transfer Assistance: Independent  Active : No  Mode of Transportation: Family, Friends  Occupation: Unemployed  Leisure & Hobbies: Sudoku  Cognition   Cognition  Overall Cognitive Status: WFL    Objective          Joint Mobility  Spine: WFL  ROM RLE: Hip and knee WFL; OTONIEL ankle ROM d/t Rooke Boot   ROM LLE: WFL  ROM RUE: See OT assessment- PT/ OT coeval  ROM LUE: See OT assessment- PT/ OT coeval   Strength RLE  Strength RLE: Exception  Comment: Hip and knee WFL; OTONIEL ankle d/t Rooke boot  Strength LLE  Strength LLE: WFL  Strength RUE  Comment: See OT assessment- PT/OT coeval  Strength LUE  Comment: See OT assessment- PT/ OT coeval   Motor Control  Gross Motor?: WFL  Sensation  Overall Sensation Status: Impaired(Numbness and tingling to distal RLE)  Bed mobility  Supine to Sit: (OTONIEL- pt seated EOB with OT)  Sit to Supine: Contact guard assistance  Scooting: Contact guard assistance  Transfers  Sit to Stand: Contact guard assistance  Stand to sit: Contact guard assistance  Comment: Pt required VC's to maintain NWB throughout functional transfer- good return demo   Ambulation  Ambulation?: Yes  Ambulation 1  Surface: level tile  Device: Rolling Walker  Assistance: Contact guard assistance  Gait Deviations: Slow Raegan  Distance: 10ft  Comments: Pt demonstrates NWB throughout ambulation this date. Slight UE fatigue noted following ambulation.    Stairs/Curb  Stairs?: No     Balance  Posture: Fair  Sitting - Static: Good;-  Sitting - Dynamic: Good;-  Standing - Static: Good;-  Standing - Dynamic: Fair;+  Comments: Standing balance assessed w/ RW        Plan   Plan  Times per week: 5-6x  Times per day: Daily  Current Treatment Recommendations: Strengthening, Transfer Training, Endurance Training, Patient/Caregiver Education & Training, ROM, Balance Training, Gait Training, Home Exercise Program, Functional Mobility Training, Stair training, Safety Education & Training  Safety Devices  Type of devices: Gait belt, Nurse notified, Left in bed, Call light within reach  Restraints  Initially in place: No      AM-PAC Score     AM-PAC Inpatient Mobility without Stair Climbing Raw Score : 17 (08/22/19 1607)  AM-PAC Inpatient without Stair Climbing T-Scale Score : 48.47 (08/22/19 1607)  Mobility Inpatient CMS 0-100% Score: 32.72 (08/22/19 1607)  Mobility Inpatient without Stair CMS G-Code Modifier : Gabriels Cord (08/22/19 1607)       Goals  Short term goals  Time Frame for Short term goals: 14  Short term goal 1: Perform independent bed mobility   Short term goal 2: Demonstrate independent functional transfers  Short term goal 3: Ambulate 300ft w/ RW independently  Short term goal 4: Tolerate 45 minutes of therapy to demonstrate increased endurance  Patient Goals   Patient goals : Did not state       Therapy Time   Individual Concurrent Group Co-treatment   Time In 1500         Time Out 1510         Minutes 10                 Jen Rizzo, PT

## 2019-08-23 PROBLEM — I25.10 CORONARY ARTERY DISEASE: Status: ACTIVE | Noted: 2019-08-23

## 2019-08-23 LAB
EKG ATRIAL RATE: 91 BPM
EKG P AXIS: 68 DEGREES
EKG P-R INTERVAL: 152 MS
EKG Q-T INTERVAL: 362 MS
EKG QRS DURATION: 92 MS
EKG QTC CALCULATION (BAZETT): 445 MS
EKG R AXIS: 64 DEGREES
EKG T AXIS: 149 DEGREES
EKG VENTRICULAR RATE: 91 BPM
GLUCOSE BLD-MCNC: 119 MG/DL (ref 75–110)
GLUCOSE BLD-MCNC: 140 MG/DL (ref 75–110)
GLUCOSE BLD-MCNC: 144 MG/DL (ref 75–110)
GLUCOSE BLD-MCNC: 249 MG/DL (ref 75–110)
GLUCOSE BLD-MCNC: 250 MG/DL (ref 75–110)
GLUCOSE BLD-MCNC: 265 MG/DL (ref 75–110)
HCT VFR BLD CALC: 38.3 % (ref 40.7–50.3)
HEMOGLOBIN: 12.1 G/DL (ref 13–17)
MCH RBC QN AUTO: 28.6 PG (ref 25.2–33.5)
MCHC RBC AUTO-ENTMCNC: 31.6 G/DL (ref 28.4–34.8)
MCV RBC AUTO: 90.5 FL (ref 82.6–102.9)
NRBC AUTOMATED: 0 PER 100 WBC
PDW BLD-RTO: 12.7 % (ref 11.8–14.4)
PLATELET # BLD: 489 K/UL (ref 138–453)
PMV BLD AUTO: 9.1 FL (ref 8.1–13.5)
RBC # BLD: 4.23 M/UL (ref 4.21–5.77)
WBC # BLD: 16.9 K/UL (ref 3.5–11.3)

## 2019-08-23 PROCEDURE — 6370000000 HC RX 637 (ALT 250 FOR IP): Performed by: INTERNAL MEDICINE

## 2019-08-23 PROCEDURE — 6370000000 HC RX 637 (ALT 250 FOR IP): Performed by: STUDENT IN AN ORGANIZED HEALTH CARE EDUCATION/TRAINING PROGRAM

## 2019-08-23 PROCEDURE — 6360000002 HC RX W HCPCS: Performed by: STUDENT IN AN ORGANIZED HEALTH CARE EDUCATION/TRAINING PROGRAM

## 2019-08-23 PROCEDURE — 94760 N-INVAS EAR/PLS OXIMETRY 1: CPT

## 2019-08-23 PROCEDURE — 99232 SBSQ HOSP IP/OBS MODERATE 35: CPT | Performed by: SURGERY

## 2019-08-23 PROCEDURE — 6370000000 HC RX 637 (ALT 250 FOR IP): Performed by: SURGERY

## 2019-08-23 PROCEDURE — 1200000000 HC SEMI PRIVATE

## 2019-08-23 PROCEDURE — 36415 COLL VENOUS BLD VENIPUNCTURE: CPT

## 2019-08-23 PROCEDURE — 99254 IP/OBS CNSLTJ NEW/EST MOD 60: CPT | Performed by: INTERNAL MEDICINE

## 2019-08-23 PROCEDURE — 97116 GAIT TRAINING THERAPY: CPT

## 2019-08-23 PROCEDURE — 2580000003 HC RX 258: Performed by: STUDENT IN AN ORGANIZED HEALTH CARE EDUCATION/TRAINING PROGRAM

## 2019-08-23 PROCEDURE — 99232 SBSQ HOSP IP/OBS MODERATE 35: CPT | Performed by: INTERNAL MEDICINE

## 2019-08-23 PROCEDURE — 97110 THERAPEUTIC EXERCISES: CPT

## 2019-08-23 PROCEDURE — 82947 ASSAY GLUCOSE BLOOD QUANT: CPT

## 2019-08-23 PROCEDURE — 85027 COMPLETE CBC AUTOMATED: CPT

## 2019-08-23 PROCEDURE — 93010 ELECTROCARDIOGRAM REPORT: CPT | Performed by: INTERNAL MEDICINE

## 2019-08-23 RX ORDER — INSULIN GLARGINE 100 [IU]/ML
20 INJECTION, SOLUTION SUBCUTANEOUS DAILY
Status: DISCONTINUED | OUTPATIENT
Start: 2019-08-23 | End: 2019-08-29 | Stop reason: HOSPADM

## 2019-08-23 RX ORDER — MORPHINE SULFATE 2 MG/ML
2 INJECTION, SOLUTION INTRAMUSCULAR; INTRAVENOUS EVERY 4 HOURS PRN
Status: DISCONTINUED | OUTPATIENT
Start: 2019-08-23 | End: 2019-08-25

## 2019-08-23 RX ORDER — OXYCODONE HYDROCHLORIDE AND ACETAMINOPHEN 5; 325 MG/1; MG/1
1 TABLET ORAL EVERY 4 HOURS PRN
Status: DISCONTINUED | OUTPATIENT
Start: 2019-08-23 | End: 2019-08-25

## 2019-08-23 RX ADMIN — OXYCODONE HYDROCHLORIDE AND ACETAMINOPHEN 1 TABLET: 5; 325 TABLET ORAL at 22:13

## 2019-08-23 RX ADMIN — ACETAMINOPHEN 1000 MG: 500 TABLET ORAL at 01:11

## 2019-08-23 RX ADMIN — METOPROLOL TARTRATE 25 MG: 25 TABLET ORAL at 08:13

## 2019-08-23 RX ADMIN — METOPROLOL TARTRATE 25 MG: 25 TABLET ORAL at 20:00

## 2019-08-23 RX ADMIN — DAKIN'S SOLUTION 0.125% (QUARTER STRENGTH) 473 ML: 0.12 SOLUTION at 10:45

## 2019-08-23 RX ADMIN — INSULIN GLARGINE 20 UNITS: 100 INJECTION, SOLUTION SUBCUTANEOUS at 08:18

## 2019-08-23 RX ADMIN — ENOXAPARIN SODIUM 40 MG: 40 INJECTION SUBCUTANEOUS at 08:13

## 2019-08-23 RX ADMIN — INSULIN LISPRO 3 UNITS: 100 INJECTION, SOLUTION INTRAVENOUS; SUBCUTANEOUS at 17:53

## 2019-08-23 RX ADMIN — PIPERACILLIN AND TAZOBACTAM 3.38 G: 3; .375 INJECTION, POWDER, FOR SOLUTION INTRAVENOUS at 20:00

## 2019-08-23 RX ADMIN — PIPERACILLIN AND TAZOBACTAM 3.38 G: 3; .375 INJECTION, POWDER, FOR SOLUTION INTRAVENOUS at 12:14

## 2019-08-23 RX ADMIN — DAKIN'S SOLUTION 0.125% (QUARTER STRENGTH): 0.12 SOLUTION at 21:52

## 2019-08-23 RX ADMIN — OXYCODONE HYDROCHLORIDE AND ACETAMINOPHEN 1 TABLET: 5; 325 TABLET ORAL at 10:44

## 2019-08-23 RX ADMIN — FAMOTIDINE 20 MG: 20 TABLET, FILM COATED ORAL at 08:12

## 2019-08-23 RX ADMIN — FAMOTIDINE 20 MG: 20 TABLET, FILM COATED ORAL at 20:00

## 2019-08-23 RX ADMIN — PIPERACILLIN AND TAZOBACTAM 3.38 G: 3; .375 INJECTION, POWDER, FOR SOLUTION INTRAVENOUS at 04:12

## 2019-08-23 RX ADMIN — ASPIRIN 81 MG: 81 TABLET, CHEWABLE ORAL at 08:13

## 2019-08-23 RX ADMIN — INSULIN LISPRO 6 UNITS: 100 INJECTION, SOLUTION INTRAVENOUS; SUBCUTANEOUS at 01:10

## 2019-08-23 RX ADMIN — FUROSEMIDE 20 MG: 20 TABLET ORAL at 08:13

## 2019-08-23 RX ADMIN — TRAZODONE HYDROCHLORIDE 50 MG: 50 TABLET ORAL at 20:00

## 2019-08-23 RX ADMIN — LISINOPRIL 10 MG: 10 TABLET ORAL at 08:12

## 2019-08-23 RX ADMIN — OXYCODONE HYDROCHLORIDE AND ACETAMINOPHEN 1 TABLET: 5; 325 TABLET ORAL at 17:58

## 2019-08-23 RX ADMIN — INSULIN LISPRO 3 UNITS: 100 INJECTION, SOLUTION INTRAVENOUS; SUBCUTANEOUS at 08:16

## 2019-08-23 RX ADMIN — INSULIN LISPRO 9 UNITS: 100 INJECTION, SOLUTION INTRAVENOUS; SUBCUTANEOUS at 12:14

## 2019-08-23 RX ADMIN — ATORVASTATIN CALCIUM 40 MG: 80 TABLET, FILM COATED ORAL at 20:00

## 2019-08-23 RX ADMIN — INSULIN LISPRO 3 UNITS: 100 INJECTION, SOLUTION INTRAVENOUS; SUBCUTANEOUS at 21:51

## 2019-08-23 ASSESSMENT — PAIN DESCRIPTION - DESCRIPTORS
DESCRIPTORS: ACHING;DISCOMFORT
DESCRIPTORS: STABBING

## 2019-08-23 ASSESSMENT — PAIN SCALES - GENERAL
PAINLEVEL_OUTOF10: 0
PAINLEVEL_OUTOF10: 6
PAINLEVEL_OUTOF10: 5
PAINLEVEL_OUTOF10: 1
PAINLEVEL_OUTOF10: 4
PAINLEVEL_OUTOF10: 0
PAINLEVEL_OUTOF10: 5

## 2019-08-23 ASSESSMENT — PAIN DESCRIPTION - FREQUENCY: FREQUENCY: INTERMITTENT

## 2019-08-23 ASSESSMENT — PAIN DESCRIPTION - ORIENTATION
ORIENTATION: RIGHT
ORIENTATION: RIGHT

## 2019-08-23 ASSESSMENT — PAIN DESCRIPTION - LOCATION
LOCATION: FOOT
LOCATION: FOOT

## 2019-08-23 ASSESSMENT — PAIN DESCRIPTION - PAIN TYPE
TYPE: ACUTE PAIN;CHRONIC PAIN
TYPE: ACUTE PAIN;CHRONIC PAIN

## 2019-08-23 NOTE — PROGRESS NOTES
Multi infarct state     Carotid stenosis, bilateral     Right foot drop     Thrombocytosis (HCC)     Acute CVA (cerebrovascular accident) ([address]     Moderate malnutrition ([address]     Cellulitis of right lower extremity     Right-sided extracranial carotid artery stenosis     Essential hypertension     Type 2 diabetes mellitus with diabetic neuropathy, without long-term current use of insulin (HCC)     Leukocytosis     Status post angiography of extremity     Ischemic gangrene ([address]     Pyogenic inflammation of bone ([address]      Plan of Treatment:   1. ECHO reviewed. Intermediate risk per Dr. Jeferson Ibarra. 2. Continue PO asa, statin, Lasix, BB & ACE.     Electronically signed by ISAAC Osorio CNP on 8/23/2019 at 1:16 PM  [address]  [phone]

## 2019-08-23 NOTE — PROGRESS NOTES
General Precautions, Fall Risk  Required Braces or Orthoses?: Yes  Lower Extremity Weight Bearing Restrictions  Right Lower Extremity Weight Bearing: Non Weight Bearing  Required Braces or Orthoses  Right Lower Extremity Brace: Boot  RLE Brace Type: Rooke boot to offload RLE  Position Activity Restriction  Other position/activity restrictions: Act as tolerated  Subjective   General  Chart Reviewed: Yes  Response To Previous Treatment: Patient with no complaints from previous session. Family / Caregiver Present: No  Subjective  Subjective: RN and pt in agreement for PT.; pt lying in bed upon PT arrival, pleasant cooperative throughout   Pain Screening  Patient Currently in Pain: No  Vital Signs  Patient Currently in Pain: No       Orientation  Orientation  Overall Orientation Status: Within Functional Limits  Cognition      Objective   Bed mobility  Rolling to Left: Stand by assistance  Rolling to Right: Stand by assistance  Supine to Sit: Stand by assistance  Scooting: Stand by assistance  Comment: Retired to chair after tx. Transfers  Sit to Stand: Stand by assistance;Contact guard assistance  Stand to sit: Stand by assistance;Contact guard assistance  Comment: Pt required VC's to maintain NWB throughout functional transfer- good return demo. Can be impulsive at times. Ambulation  Ambulation?: Yes  Ambulation 1  Surface: level tile  Device: Rolling Walker  Assistance: Contact guard assistance  Gait Deviations: Slow Raegan  Comments: Pt demonstrates NWB throughout ambulation this date. Slight UE fatigue noted following ambulation. Mod amt of VC's provided for correct walker technique. Stairs/Curb  Stairs?: No     Balance  Posture: Fair  Sitting - Static: Good;-  Standing - Static: Good;-  Comments: Standing balance assessed w/ RW  Exercises  Comments: Seated LE exercise program: Long Arc Quads, hip abduction/adduction, heel/toe raises, and marches.  Reps: 20.   Standing exercise program: RLE hip flexion, hip

## 2019-08-23 NOTE — PROGRESS NOTES
[facility] - Progress Note    Today's Date and Time: 8/23/2019, 4:22 PM    Impression :   · Rt calcaneal osteomyelitis  · Rt calcaneal stage IV decubitus wound  · Severe PAD  · CAD s/p CABG x 4 (4/2019)  · DM II  · Peripheral neuropathy  · History of CVA    Recommendations:   · Continue Zosyn 3.375 gm IV q 8 hours  · Stop Vancomcyin  · Continue wound care with Dakins dressing changes BID  · Supportive care  · BKA procedure at the discretion of Vascular service    Medical Decision Making/Summary/Discussion:8/23/2019     · 52 yo gentleman who initially presented in April 2019 with chest pain  · Suffered cardiac arrest x 3, had an emergent CABG x 4 4-7-19  · Was found to have a protruding Rt tibial orthopedic screw from prior ORIF of tibial fracture without overt infection of the leg  · Then developed fevers, started on Vancomycin, developed ALMITA, transitioned to Ceftaroline with improved renal function  · MRI showed occlusion Lt carotid artery and multiple infracts  · Leg hardware was removed 4-22-19, pt transitioned to Doxycycline, with plan for chronic suppressive therapy. · Pt was discharged to SNF and did not follow up in office. · 8-14 presented to podiatry clinic after not being seen for 6 weeks. He was noted to have foul smelling drainage from the wound, and underwent debridement of the wound at the clinic and was referred to vascular for further work up. · 8-16 Was seen by Dr Gabi Gonzalez who scheduled him for an angiogram for 8-20  · 8-20 Pt admitted and started on Vancomycin and Zosyn. Angiogram completed and showed severe aortoiliac occlusive disease, bilateral profunda stenosis, right SFA occlusion with reconstitution of above knee popliteal artery with 3 vessel runoff, left CFA stenosis SFA/popliteal patent with 3 vessel runoff.   · Plan for open aortobifemoral bypass and fem-pop on the right after plavix is out of pts system has been changed to a two stage

## 2019-08-23 NOTE — PROGRESS NOTES
Progress Note  Podiatric Medicine and Surgery     Subjective     CC: R heel wound    Patient seen and examined at bedside. No acute events overnight. Low grade fever, VSS, HTN  Patient denies nausea, fever, chills. Pain is controlled  Tolerating diet    HPI :  Rosy Hand is a 53 y.o. male seen at [facility].  for evaluation of R heel wound. Request for consult by Dr. Shahram Ramirez who performed a RLE angiogram which revealed severe inflow disease. The patient admitted and planning for tentative open aortobifemoral bypass and fem-pop on the right. The patient is a type II DM. The patient denies any n/v/f/c/SOB.        ROS: Denies N/V/F/C/SOB/CP. Otherwise negative except at stated in the HPI.      Medications:  Scheduled Meds:   insulin glargine  20 Units Subcutaneous Daily    insulin lispro  0-18 Units Subcutaneous Q4H    sodium hypochlorite   Irrigation Daily    enoxaparin  40 mg Subcutaneous Daily    piperacillin-tazobactam  3.375 g Intravenous Q8H    aspirin  81 mg Oral Daily    atorvastatin  40 mg Oral Nightly    famotidine  20 mg Oral BID    furosemide  20 mg Oral Daily    lisinopril  10 mg Oral Daily    metoprolol tartrate  25 mg Oral BID    traZODone  50 mg Oral Nightly    insulin lispro  0-9 Units Subcutaneous Nightly       Continuous Infusions:   dextrose         PRN Meds:acetaminophen, glucose, dextrose, glucagon (rDNA), dextrose    Objective     Vitals:  Patient Vitals for the past 8 hrs:   BP Temp Temp src Pulse Resp SpO2   19 0730 (!) 161/67 99.1 °F (37.3 °C) Oral 94 15 95 %   19 0141 -- -- -- 79 21 --     Average, Min, and Max for last 24 hours Vitals:  TEMPERATURE:  Temp  Av °F (37.2 °C)  Min: 98.8 °F (37.1 °C)  Max: 99.1 °F (37.3 °C)    RESPIRATIONS RANGE: Resp  Av.3  Min: 15  Max: 22    PULSE RANGE: Pulse  Av.3  Min: 79  Max: 94    BLOOD PRESSURE RANGE:  Systolic (89YCU), YRX:501 , Min:120 , ODQ:379   ; Diastolic (33JFC), XD, Min:59, Max:67      PULSE OXIMETRY

## 2019-08-23 NOTE — PROGRESS NOTES
[facility] - Initial Consult Note  Today's Date and Time: 8/23/2019, 12:10 PM    Impression :   · Rt calcaneal osteomyelitis  · Rt calcaneal stage IV decubitus wound  · Severe PAD  · CAD s/p CABG x 4 (4/2019)  · DM II  · Peripheral neuropathy  · History of CVA    Recommendations:   · Continue Zosyn 3.375 gm IV q 8 hours  · Stop Vancomcyin  · Continue wound care with Dakins dressing changes BID  · Supportive care  · Revascularization procedure at the discretion of Vascular service    Medical Decision Making/Summary/Discussion:8/23/2019     · 52 yo gentleman who initially presented in April 2019 with chest pain  · Suffered cardiac arrest x 3, had an emergent CABG x 4 4-7-19  · Was found to have a protruding Rt tibial orthopedic screw from prior ORIF of tibial fracture without overt infection of the leg  · Then developed fevers, started on Vancomycin, developed ALMITA, transitioned to Ceftaroline with improved renal function  · MRI showed occlusion Lt carotid artery and multiple infracts  · Leg hardware was removed 4-22-19, pt transitioned to Doxycycline, with plan for chronic suppressive therapy. · Pt was discharged to SNF and did not follow up in office. · 8-14 presented to podiatry clinic after not being seen for 6 weeks. He was noted to have foul smelling drainage from the wound, and underwent debridement of the wound at the clinic and was referred to vascular for further work up. · 8-16 Was seen by Dr Mario Berger who scheduled him for an angiogram for 8-20  · 8-20 Pt admitted and started on Vancomycin and Zosyn. Angiogram completed and showed severe aortoiliac occlusive disease, bilateral profunda stenosis, right SFA occlusion with reconstitution of above knee popliteal artery with 3 vessel runoff, left CFA stenosis SFA/popliteal patent with 3 vessel runoff.   · Plan for open aortobifemoral bypass and fem-pop on the right after plavix is out of pts system  MRI  8-22 showed Past Surgical  History:     Past Surgical History:   Procedure Laterality Date    CARDIAC CATHETERIZATION  04/07/2019    MVD-Stv    CAROTID STENT PLACEMENT  07/03/2019    ACCULINK CAROTID STENT PLACED, MRI CONDITIONAL 5, 3T OK.      CORONARY ARTERY BYPASS GRAFT  04/07/2019    x4 per Dr. Jacquie Romo, [address] GRAFT N/A 4/7/2019    EMERGENT CPR,  CORONARY ARTERY BYPASS X4, ON PUMP ,CHEST LEFT OPEN; SWAN, ANGELA PER ANESTHESIA performed by Elias Melendez MD at [facility] 2000   [address]      unsure if correct known tibial surgery    HARDWARE REMOVAL Right 04/07/2019    R Knee internal screw protruding through skin    HARDWARE REMOVAL Right 04/22/2019    HARDWARE REMOVAL RIGHT TIBIA,    RECONSTRUCTIVE REPAIR STERNAL N/A 4/8/2019    STERNAL  WOUND WASHOUT AND CLOSURE S/P CABG performed by Elias Melendez MD at [address] N/A 4/22/2019    HARDWARE REMOVAL RIGHT TIBIA, IRRIGATION AND DEBRIDEMENT [address] TIBIA performed by Derrek Garcia MD at [facility] Right 2000    Walda Larve off roof, external fixation and internal hardware    TONSILLECTOMY  1974       Medications:      insulin glargine  20 Units Subcutaneous Daily    insulin lispro  0-18 Units Subcutaneous Q4H    sodium hypochlorite   Irrigation Daily    enoxaparin  40 mg Subcutaneous Daily    piperacillin-tazobactam  3.375 g Intravenous Q8H    aspirin  81 mg Oral Daily    atorvastatin  40 mg Oral Nightly    famotidine  20 mg Oral BID    furosemide  20 mg Oral Daily    lisinopril  10 mg Oral Daily    metoprolol tartrate  25 mg Oral BID    traZODone  50 mg Oral Nightly    insulin lispro  0-9 Units Subcutaneous Nightly       Social History:     Social History     Socioeconomic History    Marital status:      Spouse name: Not on file    Number of children: Not on file    Years of education: Not on file   Anson Lui  Patchy edema with loss of   T1 hyperintensity in the cuneiforms and the cuboid more equivocal for   osteomyelitis given their distance from the opened wound.       JOINT SPACES: The joint spaces are maintained.  No suspicious joint effusions   or marginal edema.       Soft tissues: Skin ulceration of the heel measuring 2.8 cm in length and 3.9   cm in width reaching the calcaneus.  Muscle edema posteriorly in the hindfoot   may be reactive or due to infectious myositis.  No gross evidence of abscess   but lack of IV contrast limits evaluation.           Impression   Osteomyelitis of the calcaneal tuberosity and likely within the talus.       Edema within the midfoot bones equivocal for osteomyelitis versus reactive   edema related to Charcot arthropathy.       Mild tenosynovitis of the peroneal tendons without significant fluid at the   level of the malleoli.       Partial tear of the flexor digitorum tendon with mild tenosynovitis. Medical Decision Deyfdg-Ouyrjbbv-Rcpgn:       Medical Decision Making-Other:     Note:  · Labs, medications, radiologic studies were reviewed with personal review of films  · Moderate Large amounts of data were reviewed  · Discussed with nursing Staff, Discharge planner  · Infection Control and Prevention measures reviewed  · All prior entries were reviewed  · Administer medications as ordered  · Prognosis: Guarded  · Discharge planning reviewed  · Follow up as outpatient. Thank you for allowing us to participate in the care of this patient. Please call with questions. ISAAC Khoury - CNP PGY1  Seen with Dr. Thai Brower:    I have discussed the case, including pertinent history and exam findings with the residents. I have seen and examined the patient and the key elements of the encounter have been performed by me. I have reviewed the laboratory data, other diagnostic studies and discussed them with the residents.  I have updated the medical record where

## 2019-08-24 LAB
ANION GAP SERPL CALCULATED.3IONS-SCNC: 12 MMOL/L (ref 9–17)
BUN BLDV-MCNC: 7 MG/DL (ref 6–20)
BUN/CREAT BLD: ABNORMAL (ref 9–20)
CALCIUM SERPL-MCNC: 8.6 MG/DL (ref 8.6–10.4)
CHLORIDE BLD-SCNC: 99 MMOL/L (ref 98–107)
CO2: 27 MMOL/L (ref 20–31)
CREAT SERPL-MCNC: 0.55 MG/DL (ref 0.7–1.2)
GFR AFRICAN AMERICAN: >60 ML/MIN
GFR NON-AFRICAN AMERICAN: >60 ML/MIN
GFR SERPL CREATININE-BSD FRML MDRD: ABNORMAL ML/MIN/{1.73_M2}
GFR SERPL CREATININE-BSD FRML MDRD: ABNORMAL ML/MIN/{1.73_M2}
GLUCOSE BLD-MCNC: 152 MG/DL (ref 75–110)
GLUCOSE BLD-MCNC: 153 MG/DL (ref 75–110)
GLUCOSE BLD-MCNC: 158 MG/DL (ref 75–110)
GLUCOSE BLD-MCNC: 161 MG/DL (ref 75–110)
GLUCOSE BLD-MCNC: 166 MG/DL (ref 70–99)
GLUCOSE BLD-MCNC: 186 MG/DL (ref 75–110)
HCT VFR BLD CALC: 35.2 % (ref 40.7–50.3)
HEMOGLOBIN: 11.3 G/DL (ref 13–17)
MCH RBC QN AUTO: 28.6 PG (ref 25.2–33.5)
MCHC RBC AUTO-ENTMCNC: 32.1 G/DL (ref 28.4–34.8)
MCV RBC AUTO: 89.1 FL (ref 82.6–102.9)
NRBC AUTOMATED: 0 PER 100 WBC
PDW BLD-RTO: 12.7 % (ref 11.8–14.4)
PLATELET # BLD: 488 K/UL (ref 138–453)
PMV BLD AUTO: 9.8 FL (ref 8.1–13.5)
POTASSIUM SERPL-SCNC: 4.1 MMOL/L (ref 3.7–5.3)
RBC # BLD: 3.95 M/UL (ref 4.21–5.77)
SODIUM BLD-SCNC: 138 MMOL/L (ref 135–144)
WBC # BLD: 18.7 K/UL (ref 3.5–11.3)

## 2019-08-24 PROCEDURE — 99232 SBSQ HOSP IP/OBS MODERATE 35: CPT | Performed by: SURGERY

## 2019-08-24 PROCEDURE — 6370000000 HC RX 637 (ALT 250 FOR IP): Performed by: STUDENT IN AN ORGANIZED HEALTH CARE EDUCATION/TRAINING PROGRAM

## 2019-08-24 PROCEDURE — 2500000003 HC RX 250 WO HCPCS: Performed by: STUDENT IN AN ORGANIZED HEALTH CARE EDUCATION/TRAINING PROGRAM

## 2019-08-24 PROCEDURE — 82947 ASSAY GLUCOSE BLOOD QUANT: CPT

## 2019-08-24 PROCEDURE — 36415 COLL VENOUS BLD VENIPUNCTURE: CPT

## 2019-08-24 PROCEDURE — 6370000000 HC RX 637 (ALT 250 FOR IP): Performed by: SURGERY

## 2019-08-24 PROCEDURE — 99232 SBSQ HOSP IP/OBS MODERATE 35: CPT | Performed by: INTERNAL MEDICINE

## 2019-08-24 PROCEDURE — 2580000003 HC RX 258: Performed by: STUDENT IN AN ORGANIZED HEALTH CARE EDUCATION/TRAINING PROGRAM

## 2019-08-24 PROCEDURE — 6360000002 HC RX W HCPCS: Performed by: STUDENT IN AN ORGANIZED HEALTH CARE EDUCATION/TRAINING PROGRAM

## 2019-08-24 PROCEDURE — 6370000000 HC RX 637 (ALT 250 FOR IP): Performed by: INTERNAL MEDICINE

## 2019-08-24 PROCEDURE — 85027 COMPLETE CBC AUTOMATED: CPT

## 2019-08-24 PROCEDURE — 80048 BASIC METABOLIC PNL TOTAL CA: CPT

## 2019-08-24 PROCEDURE — 1200000000 HC SEMI PRIVATE

## 2019-08-24 RX ORDER — DEXTROSE, SODIUM CHLORIDE, AND POTASSIUM CHLORIDE 5; .45; .15 G/100ML; G/100ML; G/100ML
INJECTION INTRAVENOUS CONTINUOUS
Status: DISCONTINUED | OUTPATIENT
Start: 2019-08-25 | End: 2019-08-26

## 2019-08-24 RX ADMIN — PIPERACILLIN AND TAZOBACTAM 3.38 G: 3; .375 INJECTION, POWDER, FOR SOLUTION INTRAVENOUS at 13:11

## 2019-08-24 RX ADMIN — METOPROLOL TARTRATE 25 MG: 25 TABLET ORAL at 23:12

## 2019-08-24 RX ADMIN — ATORVASTATIN CALCIUM 40 MG: 80 TABLET, FILM COATED ORAL at 23:06

## 2019-08-24 RX ADMIN — TRAZODONE HYDROCHLORIDE 50 MG: 50 TABLET ORAL at 23:06

## 2019-08-24 RX ADMIN — FUROSEMIDE 20 MG: 20 TABLET ORAL at 09:37

## 2019-08-24 RX ADMIN — INSULIN LISPRO 2 UNITS: 100 INJECTION, SOLUTION INTRAVENOUS; SUBCUTANEOUS at 16:48

## 2019-08-24 RX ADMIN — OXYCODONE HYDROCHLORIDE AND ACETAMINOPHEN 1 TABLET: 5; 325 TABLET ORAL at 09:39

## 2019-08-24 RX ADMIN — ENOXAPARIN SODIUM 40 MG: 40 INJECTION SUBCUTANEOUS at 09:37

## 2019-08-24 RX ADMIN — OXYCODONE HYDROCHLORIDE AND ACETAMINOPHEN 1 TABLET: 5; 325 TABLET ORAL at 23:04

## 2019-08-24 RX ADMIN — FAMOTIDINE 20 MG: 20 TABLET, FILM COATED ORAL at 09:37

## 2019-08-24 RX ADMIN — POTASSIUM CHLORIDE, DEXTROSE MONOHYDRATE AND SODIUM CHLORIDE: 150; 5; 450 INJECTION, SOLUTION INTRAVENOUS at 23:06

## 2019-08-24 RX ADMIN — PIPERACILLIN AND TAZOBACTAM 3.38 G: 3; .375 INJECTION, POWDER, FOR SOLUTION INTRAVENOUS at 03:48

## 2019-08-24 RX ADMIN — METOPROLOL TARTRATE 25 MG: 25 TABLET ORAL at 09:37

## 2019-08-24 RX ADMIN — PIPERACILLIN AND TAZOBACTAM 3.38 G: 3; .375 INJECTION, POWDER, FOR SOLUTION INTRAVENOUS at 21:00

## 2019-08-24 RX ADMIN — OXYCODONE HYDROCHLORIDE AND ACETAMINOPHEN 1 TABLET: 5; 325 TABLET ORAL at 16:52

## 2019-08-24 RX ADMIN — DAKIN'S SOLUTION 0.125% (QUARTER STRENGTH): 0.12 SOLUTION at 09:40

## 2019-08-24 RX ADMIN — INSULIN GLARGINE 20 UNITS: 100 INJECTION, SOLUTION SUBCUTANEOUS at 09:36

## 2019-08-24 RX ADMIN — LISINOPRIL 10 MG: 10 TABLET ORAL at 09:37

## 2019-08-24 RX ADMIN — INSULIN LISPRO 2 UNITS: 100 INJECTION, SOLUTION INTRAVENOUS; SUBCUTANEOUS at 12:35

## 2019-08-24 RX ADMIN — FAMOTIDINE 20 MG: 20 TABLET, FILM COATED ORAL at 00:00

## 2019-08-24 RX ADMIN — INSULIN LISPRO 2 UNITS: 100 INJECTION, SOLUTION INTRAVENOUS; SUBCUTANEOUS at 09:34

## 2019-08-24 RX ADMIN — ASPIRIN 81 MG: 81 TABLET, CHEWABLE ORAL at 09:37

## 2019-08-24 ASSESSMENT — PAIN SCALES - GENERAL
PAINLEVEL_OUTOF10: 6
PAINLEVEL_OUTOF10: 4
PAINLEVEL_OUTOF10: 2
PAINLEVEL_OUTOF10: 5

## 2019-08-24 ASSESSMENT — PAIN - FUNCTIONAL ASSESSMENT: PAIN_FUNCTIONAL_ASSESSMENT: PREVENTS OR INTERFERES SOME ACTIVE ACTIVITIES AND ADLS

## 2019-08-24 ASSESSMENT — PAIN DESCRIPTION - FREQUENCY: FREQUENCY: CONTINUOUS

## 2019-08-24 ASSESSMENT — PAIN DESCRIPTION - LOCATION: LOCATION: FOOT

## 2019-08-24 ASSESSMENT — PAIN DESCRIPTION - ORIENTATION: ORIENTATION: RIGHT

## 2019-08-24 ASSESSMENT — PAIN DESCRIPTION - ONSET: ONSET: ON-GOING

## 2019-08-24 ASSESSMENT — PAIN DESCRIPTION - PAIN TYPE: TYPE: ACUTE PAIN

## 2019-08-24 ASSESSMENT — PAIN DESCRIPTION - PROGRESSION: CLINICAL_PROGRESSION: NOT CHANGED

## 2019-08-24 NOTE — PROGRESS NOTES
TRICIA.    Infection Control Recommendations   · Hayes Precautions    Antimicrobial Stewardship Recommendations     · Simplification of therapy    Coordination of Outpatient Care:   · Estimated Length of IV antimicrobials: TBD  · Patient will need Midline Catheter Insertion: TBD  · Patient will need PICC line Insertion: TBD  · Patient will need: Home IV , [place],  SNF,  LTAC: TBD  · Patient will need outpatient wound care: Yes    Chief complaint/reason for consultation:   · Rt calcaneal osteomyelitis      History of Present Illness:   Rachel Cordero is a 53y.o.-year-old  male who was initially admitted on 8/20/2019. Patient seen at the request of Dr. Florencio Henderson:  Pt was hospitalized in April 2019 after suffering cardiac arrest x 3. He underwent an emergent CABG x 4. He was found to have a protruding orthopedic screw from a remote ORIF of the Rt  Tibial plateau. The screw was removed. He was initially treated with Vancomycin and then transitioned to chronic doxycycline at the time of discharge. He did not follow up in the ID clinic. At some point he developed a wound on his Rt heel and had been following with podiatry. He was seen on 6-28 and ordered for PRANAV/PVR to evaluate wound healing potential.   He did not present again until 8-14 with complaints of malodorous drainage from the wound. At that time he was urgently referred to Vascular surgery who saw him on 8-16. He was admitted to the hospital on 8-20 for an Angiogram that showed: severe aortoiliac occlusive disease, bilateral profunda stenosis, right SFA occlusion with reconstitution of above knee popliteal artery with 3 vessel runoff, left CFA stenosis SFA/popliteal patent with 3 vessel runoff. Pt was also started on Vancomycin and Zosyn because of concern for Rt calcaneal osteomyelitis.     Pt will undergo an open aortobifemoral bypass and fem-pop on the right after plavix is out of pts system    CURRENT EXAMINATION: 8/24/2019     Afebrile  VS stable    The patient seen and evaluated at bedside. Patient is about the same with no new acute issues or concerns today. Patient does not have any fevers, chills, cough, shortness of breath, chest pains or palpitations. Vascular surgery has revised their plans and suggest a two stage BKA instead of revascularization. Patient and family are accepting such a decision. First stage on 8-25-19. On exam his Rt heel wound is malodorous with thick tan drainage  There is a very thin layer of tissue over the bone with necrosis at the edges of the wound:      He is also noted to have an area of eschar over his Rt achilles. HRR, Lungs are clear. TMax: 99.4  VS: HR-103, BS: 295    Labs, X rays reviewed: 8/24/2019    BUN:  Cr: 0.48    WBC:15.8->16.9  Hb: 13.8-->11.4->12.1  Plat: 456->489    CRP: 95.5  ESR 55    Cultures:  Leg tissue:  · 4-22-19 Coag neg staph  Blood:  ·   Sputum :  ·   Wound:  ·     MRI of the right ankle 8/22/2019: Osteomyelitis of the calcaneal tuberosity and likely within the talus. Edema within the midfoot bones equivocal for osteomyelitis versus reactive edema related to Charcot arthropathy. Mild tenosynovitis of the peroneal tendons without significant fluid at the level of the malleoli. Partial tear of the flexor digitorum tendon with mild tenosynovitis. Discussed with patient, RN. I have personally reviewed the past medical history, past surgical history, medications, social history, and family history, and I have updated the database accordingly.   Past Medical History:     Past Medical History:   Diagnosis Date    CAD (coronary artery disease) 04/07/2019    Cardiac arrest ([facility] 04/07/2019    Diabetes mellitus ([facility] 04/2019    ON RX    Hyperlipidemia 04/2019    ON RX HAS NOT HAD SINCE 06/30/2019    Hypertension 04/2019    ON RX NONE SINCE 06/2019    Osteolysis 04/07/2019    R Knee hardware    PEA (Pulseless electrical activity) ([facility]) 8/22/2019 1:43 am       TECHNIQUE:   Multiplanar multisequence MRI of the right ankle was performed without the   administration of intravenous contrast.       COMPARISON:   August 20, 2019       HISTORY:   ORDERING SYSTEM PROVIDED HISTORY: Osteo R heel       Skin ulceration.  Peripheral artery disease with foot wound.       FINDINGS:   SYNDESMOTIC LIGAMENTS:  The anterior-inferior tibiofibular ligament,   interosseous membrane and posterior-inferior tibiofibular ligaments are   normal.       LATERAL COLLATERAL LIGAMENT COMPLEX: Thickening of the anterior talofibular   ligament consistent with prior trauma.  The calcaneofibular ligament is   thickened.  Posterior talofibular ligament is normal.       DELTOID LIGAMENT COMPLEX: Thickening of the deep and superficial components   of the deltoid ligament compatible with old injury.       SINUS TARSI AND SPRING LIGAMENT:  The fat plug within the sinus tarsi is   preserved and the interosseous and cervical ligaments are normal.  The   navicular-calcaneal (spring) ligament is without acute abnormality.       MEDIAL TENDONS: Tendinosis of tibialis posterior and partial tear of the   flexor digitorum tendon at the level of the tarsal tunnel.  Flexor hallucis   tendon appears intact.       LATERAL TENDONS: Tendinosis and mild tenosynovitis of the peroneal tendons   without tendon tear or tendon retraction.       ANTERIOR TENDONS: The tibialis anterior, extensor hallucis longus and   extensor digitorum longus tendons are normal in position, morphology and   signal.       ACHILLES TENDON: Tendinosis of the Achilles tendon.  No tendon tear.       PLANTAR FASCIA: Thickening of the central cord of the plantar fascia   compatible with chronic plantar fasciitis.       TARSAL TUNNEL: No mass.       BONE MARROW: Intense bone marrow edema of the calcaneal tuberosity with   relatively patchy edema and loss of T1 hyperintensity in the remaining   portions of the calcaneus.  Patchy edema

## 2019-08-25 ENCOUNTER — ANESTHESIA EVENT (OUTPATIENT)
Dept: OPERATING ROOM | Age: 54
DRG: 305 | End: 2019-08-25
Payer: MEDICAID

## 2019-08-25 ENCOUNTER — ANESTHESIA (OUTPATIENT)
Dept: OPERATING ROOM | Age: 54
DRG: 305 | End: 2019-08-25
Payer: MEDICAID

## 2019-08-25 VITALS — OXYGEN SATURATION: 97 % | TEMPERATURE: 99.4 F | DIASTOLIC BLOOD PRESSURE: 58 MMHG | SYSTOLIC BLOOD PRESSURE: 112 MMHG

## 2019-08-25 LAB
ANION GAP SERPL CALCULATED.3IONS-SCNC: 12 MMOL/L (ref 9–17)
BUN BLDV-MCNC: 8 MG/DL (ref 6–20)
BUN/CREAT BLD: ABNORMAL (ref 9–20)
CALCIUM SERPL-MCNC: 8.8 MG/DL (ref 8.6–10.4)
CHLORIDE BLD-SCNC: 97 MMOL/L (ref 98–107)
CO2: 25 MMOL/L (ref 20–31)
CREAT SERPL-MCNC: 0.54 MG/DL (ref 0.7–1.2)
GFR AFRICAN AMERICAN: >60 ML/MIN
GFR NON-AFRICAN AMERICAN: >60 ML/MIN
GFR SERPL CREATININE-BSD FRML MDRD: ABNORMAL ML/MIN/{1.73_M2}
GFR SERPL CREATININE-BSD FRML MDRD: ABNORMAL ML/MIN/{1.73_M2}
GLUCOSE BLD-MCNC: 159 MG/DL (ref 75–110)
GLUCOSE BLD-MCNC: 189 MG/DL (ref 75–110)
GLUCOSE BLD-MCNC: 191 MG/DL (ref 75–110)
GLUCOSE BLD-MCNC: 216 MG/DL (ref 75–110)
GLUCOSE BLD-MCNC: 244 MG/DL (ref 70–99)
GLUCOSE BLD-MCNC: 260 MG/DL (ref 75–110)
HCT VFR BLD CALC: 35.8 % (ref 40.7–50.3)
HEMOGLOBIN: 10.9 G/DL (ref 13–17)
MCH RBC QN AUTO: 27.6 PG (ref 25.2–33.5)
MCHC RBC AUTO-ENTMCNC: 30.4 G/DL (ref 28.4–34.8)
MCV RBC AUTO: 90.6 FL (ref 82.6–102.9)
NRBC AUTOMATED: 0 PER 100 WBC
PDW BLD-RTO: 12.5 % (ref 11.8–14.4)
PLATELET # BLD: 429 K/UL (ref 138–453)
PMV BLD AUTO: 10 FL (ref 8.1–13.5)
POTASSIUM SERPL-SCNC: 3.7 MMOL/L (ref 3.7–5.3)
RBC # BLD: 3.95 M/UL (ref 4.21–5.77)
SODIUM BLD-SCNC: 134 MMOL/L (ref 135–144)
WBC # BLD: 17.3 K/UL (ref 3.5–11.3)

## 2019-08-25 PROCEDURE — 88307 TISSUE EXAM BY PATHOLOGIST: CPT

## 2019-08-25 PROCEDURE — 80048 BASIC METABOLIC PNL TOTAL CA: CPT

## 2019-08-25 PROCEDURE — 2500000003 HC RX 250 WO HCPCS: Performed by: NURSE ANESTHETIST, CERTIFIED REGISTERED

## 2019-08-25 PROCEDURE — 6370000000 HC RX 637 (ALT 250 FOR IP): Performed by: STUDENT IN AN ORGANIZED HEALTH CARE EDUCATION/TRAINING PROGRAM

## 2019-08-25 PROCEDURE — 3600000013 HC SURGERY LEVEL 3 ADDTL 15MIN: Performed by: SURGERY

## 2019-08-25 PROCEDURE — 6360000002 HC RX W HCPCS: Performed by: STUDENT IN AN ORGANIZED HEALTH CARE EDUCATION/TRAINING PROGRAM

## 2019-08-25 PROCEDURE — 27889 ANKLE DISARTICULATION: CPT | Performed by: SURGERY

## 2019-08-25 PROCEDURE — 2709999900 HC NON-CHARGEABLE SUPPLY: Performed by: SURGERY

## 2019-08-25 PROCEDURE — 99232 SBSQ HOSP IP/OBS MODERATE 35: CPT | Performed by: INTERNAL MEDICINE

## 2019-08-25 PROCEDURE — 2720000010 HC SURG SUPPLY STERILE: Performed by: SURGERY

## 2019-08-25 PROCEDURE — 2580000003 HC RX 258: Performed by: SURGERY

## 2019-08-25 PROCEDURE — 85027 COMPLETE CBC AUTOMATED: CPT

## 2019-08-25 PROCEDURE — 99024 POSTOP FOLLOW-UP VISIT: CPT | Performed by: SURGERY

## 2019-08-25 PROCEDURE — 1200000000 HC SEMI PRIVATE

## 2019-08-25 PROCEDURE — 2580000003 HC RX 258: Performed by: NURSE ANESTHETIST, CERTIFIED REGISTERED

## 2019-08-25 PROCEDURE — 7100000001 HC PACU RECOVERY - ADDTL 15 MIN: Performed by: SURGERY

## 2019-08-25 PROCEDURE — 3700000000 HC ANESTHESIA ATTENDED CARE: Performed by: SURGERY

## 2019-08-25 PROCEDURE — 7100000000 HC PACU RECOVERY - FIRST 15 MIN: Performed by: SURGERY

## 2019-08-25 PROCEDURE — 2580000003 HC RX 258: Performed by: STUDENT IN AN ORGANIZED HEALTH CARE EDUCATION/TRAINING PROGRAM

## 2019-08-25 PROCEDURE — 6360000002 HC RX W HCPCS

## 2019-08-25 PROCEDURE — 0Y6H0Z3 DETACHMENT AT RIGHT LOWER LEG, LOW, OPEN APPROACH: ICD-10-PCS | Performed by: SURGERY

## 2019-08-25 PROCEDURE — 3700000001 HC ADD 15 MINUTES (ANESTHESIA): Performed by: SURGERY

## 2019-08-25 PROCEDURE — 36415 COLL VENOUS BLD VENIPUNCTURE: CPT

## 2019-08-25 PROCEDURE — 6360000002 HC RX W HCPCS: Performed by: NURSE ANESTHETIST, CERTIFIED REGISTERED

## 2019-08-25 PROCEDURE — 2500000003 HC RX 250 WO HCPCS: Performed by: STUDENT IN AN ORGANIZED HEALTH CARE EDUCATION/TRAINING PROGRAM

## 2019-08-25 PROCEDURE — 82947 ASSAY GLUCOSE BLOOD QUANT: CPT

## 2019-08-25 PROCEDURE — 3600000003 HC SURGERY LEVEL 3 BASE: Performed by: SURGERY

## 2019-08-25 RX ORDER — MORPHINE SULFATE 2 MG/ML
INJECTION, SOLUTION INTRAMUSCULAR; INTRAVENOUS
Status: COMPLETED
Start: 2019-08-25 | End: 2019-08-25

## 2019-08-25 RX ORDER — PROPOFOL 10 MG/ML
INJECTION, EMULSION INTRAVENOUS PRN
Status: DISCONTINUED | OUTPATIENT
Start: 2019-08-25 | End: 2019-08-25 | Stop reason: SDUPTHER

## 2019-08-25 RX ORDER — HYDRALAZINE HYDROCHLORIDE 20 MG/ML
5 INJECTION INTRAMUSCULAR; INTRAVENOUS EVERY 10 MIN PRN
Status: DISCONTINUED | OUTPATIENT
Start: 2019-08-25 | End: 2019-08-25 | Stop reason: HOSPADM

## 2019-08-25 RX ORDER — FENTANYL CITRATE 50 UG/ML
25 INJECTION, SOLUTION INTRAMUSCULAR; INTRAVENOUS EVERY 5 MIN PRN
Status: DISCONTINUED | OUTPATIENT
Start: 2019-08-25 | End: 2019-08-25 | Stop reason: HOSPADM

## 2019-08-25 RX ORDER — LIDOCAINE HYDROCHLORIDE 10 MG/ML
INJECTION, SOLUTION EPIDURAL; INFILTRATION; INTRACAUDAL; PERINEURAL PRN
Status: DISCONTINUED | OUTPATIENT
Start: 2019-08-25 | End: 2019-08-25 | Stop reason: SDUPTHER

## 2019-08-25 RX ORDER — DIPHENHYDRAMINE HYDROCHLORIDE 50 MG/ML
12.5 INJECTION INTRAMUSCULAR; INTRAVENOUS
Status: DISCONTINUED | OUTPATIENT
Start: 2019-08-25 | End: 2019-08-25 | Stop reason: HOSPADM

## 2019-08-25 RX ORDER — PROMETHAZINE HYDROCHLORIDE 25 MG/ML
6.25 INJECTION, SOLUTION INTRAMUSCULAR; INTRAVENOUS
Status: DISCONTINUED | OUTPATIENT
Start: 2019-08-25 | End: 2019-08-25 | Stop reason: HOSPADM

## 2019-08-25 RX ORDER — FENTANYL CITRATE 50 UG/ML
INJECTION, SOLUTION INTRAMUSCULAR; INTRAVENOUS PRN
Status: DISCONTINUED | OUTPATIENT
Start: 2019-08-25 | End: 2019-08-25 | Stop reason: SDUPTHER

## 2019-08-25 RX ORDER — MORPHINE SULFATE 2 MG/ML
2 INJECTION, SOLUTION INTRAMUSCULAR; INTRAVENOUS
Status: DISCONTINUED | OUTPATIENT
Start: 2019-08-25 | End: 2019-08-27

## 2019-08-25 RX ORDER — ONDANSETRON 2 MG/ML
4 INJECTION INTRAMUSCULAR; INTRAVENOUS
Status: DISCONTINUED | OUTPATIENT
Start: 2019-08-25 | End: 2019-08-25 | Stop reason: HOSPADM

## 2019-08-25 RX ORDER — OXYCODONE HYDROCHLORIDE AND ACETAMINOPHEN 5; 325 MG/1; MG/1
1 TABLET ORAL EVERY 4 HOURS PRN
Status: DISCONTINUED | OUTPATIENT
Start: 2019-08-25 | End: 2019-08-25 | Stop reason: HOSPADM

## 2019-08-25 RX ORDER — MAGNESIUM HYDROXIDE 1200 MG/15ML
LIQUID ORAL CONTINUOUS PRN
Status: COMPLETED | OUTPATIENT
Start: 2019-08-25 | End: 2019-08-25

## 2019-08-25 RX ORDER — FENTANYL CITRATE 50 UG/ML
50 INJECTION, SOLUTION INTRAMUSCULAR; INTRAVENOUS EVERY 5 MIN PRN
Status: DISCONTINUED | OUTPATIENT
Start: 2019-08-25 | End: 2019-08-25 | Stop reason: HOSPADM

## 2019-08-25 RX ORDER — 0.9 % SODIUM CHLORIDE 0.9 %
500 INTRAVENOUS SOLUTION INTRAVENOUS
Status: DISCONTINUED | OUTPATIENT
Start: 2019-08-25 | End: 2019-08-25 | Stop reason: HOSPADM

## 2019-08-25 RX ORDER — SODIUM CHLORIDE, SODIUM LACTATE, POTASSIUM CHLORIDE, CALCIUM CHLORIDE 600; 310; 30; 20 MG/100ML; MG/100ML; MG/100ML; MG/100ML
INJECTION, SOLUTION INTRAVENOUS CONTINUOUS PRN
Status: DISCONTINUED | OUTPATIENT
Start: 2019-08-25 | End: 2019-08-25 | Stop reason: SDUPTHER

## 2019-08-25 RX ORDER — OXYCODONE HYDROCHLORIDE AND ACETAMINOPHEN 5; 325 MG/1; MG/1
1 TABLET ORAL EVERY 4 HOURS PRN
Status: DISCONTINUED | OUTPATIENT
Start: 2019-08-25 | End: 2019-08-26

## 2019-08-25 RX ORDER — LABETALOL HYDROCHLORIDE 5 MG/ML
5 INJECTION, SOLUTION INTRAVENOUS EVERY 10 MIN PRN
Status: DISCONTINUED | OUTPATIENT
Start: 2019-08-25 | End: 2019-08-25 | Stop reason: HOSPADM

## 2019-08-25 RX ADMIN — MORPHINE SULFATE 2 MG: 2 INJECTION, SOLUTION INTRAMUSCULAR; INTRAVENOUS at 18:42

## 2019-08-25 RX ADMIN — MORPHINE SULFATE 2 MG: 2 INJECTION, SOLUTION INTRAMUSCULAR; INTRAVENOUS at 11:39

## 2019-08-25 RX ADMIN — PIPERACILLIN AND TAZOBACTAM 3.38 G: 3; .375 INJECTION, POWDER, FOR SOLUTION INTRAVENOUS at 12:47

## 2019-08-25 RX ADMIN — FENTANYL CITRATE 50 MCG: 50 INJECTION INTRAMUSCULAR; INTRAVENOUS at 08:59

## 2019-08-25 RX ADMIN — PIPERACILLIN AND TAZOBACTAM 3.38 G: 3; .375 INJECTION, POWDER, FOR SOLUTION INTRAVENOUS at 04:45

## 2019-08-25 RX ADMIN — MORPHINE SULFATE 2 MG: 2 INJECTION, SOLUTION INTRAMUSCULAR; INTRAVENOUS at 16:13

## 2019-08-25 RX ADMIN — LISINOPRIL 10 MG: 10 TABLET ORAL at 12:41

## 2019-08-25 RX ADMIN — INSULIN LISPRO 2 UNITS: 100 INJECTION, SOLUTION INTRAVENOUS; SUBCUTANEOUS at 17:44

## 2019-08-25 RX ADMIN — FENTANYL CITRATE 50 MCG: 50 INJECTION INTRAMUSCULAR; INTRAVENOUS at 08:57

## 2019-08-25 RX ADMIN — PROPOFOL 150 MG: 10 INJECTION, EMULSION INTRAVENOUS at 08:39

## 2019-08-25 RX ADMIN — FAMOTIDINE 20 MG: 20 TABLET, FILM COATED ORAL at 12:42

## 2019-08-25 RX ADMIN — PIPERACILLIN AND TAZOBACTAM 3.38 G: 3; .375 INJECTION, POWDER, FOR SOLUTION INTRAVENOUS at 20:50

## 2019-08-25 RX ADMIN — PHENYLEPHRINE HYDROCHLORIDE 100 MCG: 10 INJECTION INTRAVENOUS at 08:56

## 2019-08-25 RX ADMIN — LIDOCAINE HYDROCHLORIDE 50 MG: 10 INJECTION, SOLUTION EPIDURAL; INFILTRATION; INTRACAUDAL; PERINEURAL at 08:39

## 2019-08-25 RX ADMIN — PHENYLEPHRINE HYDROCHLORIDE 100 MCG: 10 INJECTION INTRAVENOUS at 08:47

## 2019-08-25 RX ADMIN — FAMOTIDINE 20 MG: 20 TABLET, FILM COATED ORAL at 21:06

## 2019-08-25 RX ADMIN — INSULIN LISPRO 3 UNITS: 100 INJECTION, SOLUTION INTRAVENOUS; SUBCUTANEOUS at 21:15

## 2019-08-25 RX ADMIN — TRAZODONE HYDROCHLORIDE 50 MG: 50 TABLET ORAL at 21:06

## 2019-08-25 RX ADMIN — POTASSIUM CHLORIDE, DEXTROSE MONOHYDRATE AND SODIUM CHLORIDE: 150; 5; 450 INJECTION, SOLUTION INTRAVENOUS at 17:51

## 2019-08-25 RX ADMIN — ASPIRIN 81 MG: 81 TABLET, CHEWABLE ORAL at 12:42

## 2019-08-25 RX ADMIN — METOPROLOL TARTRATE 25 MG: 25 TABLET ORAL at 21:07

## 2019-08-25 RX ADMIN — MORPHINE SULFATE 2 MG: 2 INJECTION, SOLUTION INTRAMUSCULAR; INTRAVENOUS at 09:37

## 2019-08-25 RX ADMIN — OXYCODONE HYDROCHLORIDE AND ACETAMINOPHEN 1 TABLET: 5; 325 TABLET ORAL at 10:47

## 2019-08-25 RX ADMIN — METOPROLOL TARTRATE 25 MG: 25 TABLET ORAL at 12:42

## 2019-08-25 RX ADMIN — MORPHINE SULFATE 2 MG: 2 INJECTION, SOLUTION INTRAMUSCULAR; INTRAVENOUS at 13:56

## 2019-08-25 RX ADMIN — INSULIN LISPRO 2 UNITS: 100 INJECTION, SOLUTION INTRAVENOUS; SUBCUTANEOUS at 12:45

## 2019-08-25 RX ADMIN — ATORVASTATIN CALCIUM 40 MG: 80 TABLET, FILM COATED ORAL at 21:06

## 2019-08-25 RX ADMIN — OXYCODONE HYDROCHLORIDE AND ACETAMINOPHEN 1 TABLET: 5; 325 TABLET ORAL at 19:49

## 2019-08-25 RX ADMIN — ENOXAPARIN SODIUM 40 MG: 40 INJECTION SUBCUTANEOUS at 12:43

## 2019-08-25 RX ADMIN — MORPHINE SULFATE 2 MG: 2 INJECTION, SOLUTION INTRAMUSCULAR; INTRAVENOUS at 21:06

## 2019-08-25 RX ADMIN — SODIUM CHLORIDE, POTASSIUM CHLORIDE, SODIUM LACTATE AND CALCIUM CHLORIDE: 600; 310; 30; 20 INJECTION, SOLUTION INTRAVENOUS at 08:33

## 2019-08-25 ASSESSMENT — PULMONARY FUNCTION TESTS
PIF_VALUE: 12
PIF_VALUE: 11
PIF_VALUE: 1
PIF_VALUE: 11
PIF_VALUE: 2
PIF_VALUE: 11
PIF_VALUE: 2
PIF_VALUE: 11
PIF_VALUE: 11
PIF_VALUE: 29
PIF_VALUE: 11
PIF_VALUE: 2
PIF_VALUE: 1
PIF_VALUE: 0
PIF_VALUE: 2
PIF_VALUE: 1
PIF_VALUE: 11
PIF_VALUE: 8
PIF_VALUE: 11
PIF_VALUE: 1
PIF_VALUE: 0
PIF_VALUE: 11
PIF_VALUE: 2
PIF_VALUE: 11
PIF_VALUE: 1
PIF_VALUE: 12
PIF_VALUE: 1
PIF_VALUE: 0
PIF_VALUE: 12

## 2019-08-25 ASSESSMENT — PAIN SCALES - GENERAL
PAINLEVEL_OUTOF10: 2
PAINLEVEL_OUTOF10: 6
PAINLEVEL_OUTOF10: 0
PAINLEVEL_OUTOF10: 4
PAINLEVEL_OUTOF10: 5
PAINLEVEL_OUTOF10: 3
PAINLEVEL_OUTOF10: 5
PAINLEVEL_OUTOF10: 3
PAINLEVEL_OUTOF10: 6
PAINLEVEL_OUTOF10: 6
PAINLEVEL_OUTOF10: 3
PAINLEVEL_OUTOF10: 3
PAINLEVEL_OUTOF10: 5
PAINLEVEL_OUTOF10: 6

## 2019-08-25 ASSESSMENT — PAIN DESCRIPTION - LOCATION: LOCATION: LEG

## 2019-08-25 ASSESSMENT — PAIN DESCRIPTION - PAIN TYPE: TYPE: SURGICAL PAIN

## 2019-08-25 ASSESSMENT — PAIN DESCRIPTION - PROGRESSION: CLINICAL_PROGRESSION: NOT CHANGED

## 2019-08-25 ASSESSMENT — PAIN DESCRIPTION - DESCRIPTORS: DESCRIPTORS: DISCOMFORT;DULL;HEAVINESS

## 2019-08-25 ASSESSMENT — PAIN - FUNCTIONAL ASSESSMENT: PAIN_FUNCTIONAL_ASSESSMENT: PREVENTS OR INTERFERES SOME ACTIVE ACTIVITIES AND ADLS

## 2019-08-25 ASSESSMENT — PAIN DESCRIPTION - ORIENTATION: ORIENTATION: RIGHT

## 2019-08-25 ASSESSMENT — PAIN DESCRIPTION - FREQUENCY: FREQUENCY: CONTINUOUS

## 2019-08-25 ASSESSMENT — PAIN DESCRIPTION - ONSET: ONSET: ON-GOING

## 2019-08-25 NOTE — BRIEF OP NOTE
Brief Postoperative Note  ______________________________________________________________    Patient: Yobany Fu  YOB: 1965  MRN: [id number]  Date of Procedure: 8/25/2019    Pre-Op Diagnosis: RIGHT FOOT wet GANGRENE    Post-Op Diagnosis: Same       Procedure(s):  RIGHT FOOT GUILLOTINE AMPUTATION    Anesthesia: General    Surgeon(s): Bebo Barahona MD    Assistant: Manuel Trejo PGY4    Estimated Blood Loss (mL): 5    Complications: None    Specimens:   ID Type Source Tests Collected by Time Destination   A :  Tissue Leg SURGICAL PATHOLOGY Bebo Barahona MD 8/25/2019 0900      Findings: non healing wound right heel with osteomyelitis. No purulence noted at amputation site.     Wellington Tobin DO  Date: 8/25/2019  Time: 9:04 AM     Electronically signed by Bebo Barahona MD on 8/25/2019

## 2019-08-25 NOTE — PROGRESS NOTES
Division of Vascular Surgery             Progress Note      Name: Amado Tomlin  MRN: [id number]         Overnight Events:      No acute overnight events      Subjective:     Patient was seen at bedside. No acute overnight events. Patient anxious this morning about his upcoming surgery and was comforted. No acute changes in his status. No complaints at this time. Physical Exam:     Vitals:  BP (!) 156/65   Pulse 100   Temp 98.3 °F (36.8 °C) (Oral)   Resp 15   Ht 5' 5\" (1.651 m)   Wt 142 lb 11.2 oz (64.7 kg)   SpO2 97%   BMI 23.75 kg/m²       General appearance - In no acute distress  Mental status - normal affect  Head - normocephalic and atraumatic  Neck - no JVD  Chest - normal effort  Heart - normal rate, regular rhythm  Abdomen - non-distended  Neurological - no focal findings or movement disorder noted  Extremities - peripheral pulses palpable + audible on doppler. Wound present on right foot      Imaging:         Assessment:     1. PAD  2. Non-healing Right foot ulcer  3. Calcaneal osteomyelitis      Plan:     1. For Right Lower Extremity Guillotine Amputation Today  2. Continue pain management per regimen  3. Continue Aspirin 81 mg therapy  4. IM following for medical management  5. AM labs Reviewed. White count down to 17.3  6. Continue Zosyn 3.375 g q8 hours  7. DVT ppx, Lovenox  8. Continue with wound care management  9. Continue medical management per primary       Electronically signed by Jean Marie Bashir on 8/25/2019 at 7:16 AM      Resident addendum:  Patient seen and examined. Doing well. COncerned and nervous for surgery. No acute medical issues overnight. Proceed to OR for right foot guillotine amputation.  Formal written consent obtained and is in patient's chart  - Will proceed with conversion of BKA later this week  - Continue abx therapy    Electronically signed by Reyes Sportsman, DO on 8/25/2019 at 7:45 AM

## 2019-08-25 NOTE — PROGRESS NOTES
[facility] - Progress Note    Today's Date and Time: 8/25/2019, 4:45 PM    Impression :   · Rt calcaneal osteomyelitis  · Rt calcaneal stage IV decubitus wound  · Severe PAD  · CAD s/p CABG x 4 (4/2019)  · DM II  · Peripheral neuropathy  · History of CVA    Recommendations:   · Continue Zosyn 3.375 gm IV q 8 hours  · Stop Vancomcyin  · Continue wound care with Dakins dressing changes BID  · Supportive care  · BKA procedure at the discretion of Vascular service    Medical Decision Making/Summary/Discussion:8/25/2019     · 54 yo gentleman who initially presented in April 2019 with chest pain  · Suffered cardiac arrest x 3, had an emergent CABG x 4 4-7-19  · Was found to have a protruding Rt tibial orthopedic screw from prior ORIF of tibial fracture without overt infection of the leg  · Then developed fevers, started on Vancomycin, developed ALMITA, transitioned to Ceftaroline with improved renal function  · MRI showed occlusion Lt carotid artery and multiple infracts  · Leg hardware was removed 4-22-19, pt transitioned to Doxycycline, with plan for chronic suppressive therapy. · Pt was discharged to SNF and did not follow up in office. · 8-14 presented to podiatry clinic after not being seen for 6 weeks. He was noted to have foul smelling drainage from the wound, and underwent debridement of the wound at the clinic and was referred to vascular for further work up. · 8-16 Was seen by Dr Reyna Schneider who scheduled him for an angiogram for 8-20  · 8-20 Pt admitted and started on Vancomycin and Zosyn. Angiogram completed and showed severe aortoiliac occlusive disease, bilateral profunda stenosis, right SFA occlusion with reconstitution of above knee popliteal artery with 3 vessel runoff, left CFA stenosis SFA/popliteal patent with 3 vessel runoff.   · Plan for open aortobifemoral bypass and fem-pop on the right after plavix is out of pts system has been changed to a two stage level: Not on file   Occupational History    Not on file   Social Needs    Financial resource strain: Not on file    Food insecurity:     Worry: Not on file     Inability: Not on file    Transportation needs:     Medical: Not on file     Non-medical: Not on file   Tobacco Use    Smoking status: Former Smoker     Packs/day: 1.00     Years: 20.00     Pack years: 20.00     Last attempt to quit: 2019     Years since quittin.3    Smokeless tobacco: Never Used   Substance and Sexual Activity    Alcohol use: No     Comment: RECOVERING X 20 YEARS    Drug use: No    Sexual activity: Not on file   Lifestyle    Physical activity:     Days per week: Not on file     Minutes per session: Not on file    Stress: Not on file   Relationships    Social connections:     Talks on phone: Not on file     Gets together: Not on file     Attends Church service: Not on file     Active member of club or organization: Not on file     Attends meetings of clubs or organizations: Not on file     Relationship status: Not on file    Intimate partner violence:     Fear of current or ex partner: Not on file     Emotionally abused: Not on file     Physically abused: Not on file     Forced sexual activity: Not on file   Other Topics Concern    Not on file   Social History Narrative    ** Merged History Encounter **            Family History:     Family History   Problem Relation Age of Onset    Heart Disease Mother         CAD WITH STENT    Diabetes Mother     Stroke Mother     Diabetes Father     Stroke Father     Heart Disease Father         CAD    High Blood Pressure Father     High Blood Pressure Sister     Diabetes Brother     Heart Disease Brother     High Blood Pressure Brother         Allergies:   Patient has no known allergies. Review of Systems:   Constitutional: No fevers or chills. No systemic complaints  Head: No headaches  Eyes: No double vision or blurry vision. No conjunctival inflammation.   ENT: reviewed  · Follow up as outpatient. Thank you for allowing us to participate in the care of this patient. Please call with questions. Bella Cohen, DPM PGY 1  Seen with Dr. Surinder Monzon:    I have discussed the case, including pertinent history and exam findings with the residents. I have seen and examined the patient and the key elements of the encounter have been performed by me. I have reviewed the laboratory data, other diagnostic studies and discussed them with the residents. I have updated the medical record where necessary. I agree with the assessment, plan and orders as documented by the resident.     Rodrigo Christianson MD.      Pager: [phone] - Office: [phone]

## 2019-08-25 NOTE — PLAN OF CARE
Continue to monitor
Problem: Falls - Risk of:  Goal: Will remain free from falls  Description  Will remain free from falls  8/25/2019 1815 by Joe Bundy RN  Outcome: Ongoing  3/14/9981 4317 by Jackelyn Veliz RN  Outcome: Met This Shift  Goal: Absence of physical injury  Description  Absence of physical injury  8/25/2019 1815 by Joe Bundy RN  Outcome: Ongoing  3/36/3574 7388 by Jackelyn Veliz RN  Outcome: Met This Shift     Problem: Pain:  Goal: Pain level will decrease  Description  Pain level will decrease  8/25/2019 1815 by Joe Bundy RN  Outcome: Ongoing  1/73/7485 9904 by Jackelyn Veliz RN  Outcome: Ongoing  Goal: Control of acute pain  Description  Control of acute pain  8/25/2019 1815 by Joe Bundy RN  Outcome: Ongoing  7/63/0515 7896 by Jackelyn Veliz RN  Outcome: Ongoing  Goal: Control of chronic pain  Description  Control of chronic pain  8/25/2019 1815 by Joe Bundy RN  Outcome: Ongoing  8/65/3081 4303 by Jackelyn Veliz RN  Outcome: Ongoing     Problem: Skin Integrity:  Goal: Will show no infection signs and symptoms  Description  Will show no infection signs and symptoms  8/25/2019 1815 by Joe Bundy RN  Outcome: Ongoing  8/73/6669 0074 by Jackelyn Veliz RN  Outcome: Ongoing  Goal: Absence of new skin breakdown  Description  Absence of new skin breakdown  8/25/2019 1815 by Joe Bundy RN  Outcome: Ongoing  4/83/7683 8899 by Jackelyn Veliz RN  Outcome: Met This Shift     Problem: Risk for Impaired Skin Integrity  Goal: Tissue integrity - skin and mucous membranes  Description  Structural intactness and normal physiological function of skin and  mucous membranes.   8/25/2019 1815 by Joe Bundy RN  Outcome: Ongoing  6/95/8294 5006 by Jackelyn Veliz RN  Outcome: Ongoing     Problem: Musculor/Skeletal Functional Status  Goal: Highest potential functional level  8/25/2019 1815 by Joe Bundy RN  Outcome: Ongoing  5/61/3466 0311 by Juan Garcia
Sylwia Lucero RN  Outcome: Ongoing  8/23/2019 0611 by Justin Aguilar RN  Outcome: Ongoing     Problem: Musculor/Skeletal Functional Status  Goal: Highest potential functional level  8/23/2019 1808 by Ezekiel Mckenzie RN  Outcome: Ongoing  8/23/2019 0611 by Justin Aguilar RN  Outcome: Ongoing  Goal: Absence of falls  8/23/2019 1808 by Ezekiel Mckenzie RN  Outcome: Ongoing  8/23/2019 0611 by Justin Aguilar RN  Outcome: Ongoing
and normal physiological function of skin and  mucous membranes.   8/24/2019 1132 by Franco Barakat RN  Outcome: Ongoing  8/24/2019 0412 by Ting Vieira RN  Outcome: Ongoing  8/24/2019 0412 by Ting Vieira RN  Outcome: Ongoing     Problem: Musculor/Skeletal Functional Status  Goal: Highest potential functional level  8/24/2019 1132 by Franco Barakat RN  Outcome: Ongoing  8/24/2019 0412 by Ting Vieira RN  Outcome: Ongoing  8/24/2019 0412 by Ting Vieira RN  Outcome: Ongoing  Goal: Absence of falls  8/24/2019 1132 by Franco Barakat RN  Outcome: Ongoing  8/24/2019 0412 by Ting Vieira RN  Outcome: Ongoing  8/24/2019 0412 by Ting Vieira RN  Outcome: Ongoing     Problem: Musculor/Skeletal Functional Status  Goal: Highest potential functional level  8/24/2019 1132 by Franco Barakat RN  Outcome: Ongoing  8/24/2019 0412 by Ting Vieira RN  Outcome: Ongoing  8/24/2019 0412 by Ting Vieira RN  Outcome: Ongoing  Goal: Absence of falls  8/24/2019 1132 by Franco Barakat RN  Outcome: Ongoing  8/24/2019 0412 by Ting Vieira RN  Outcome: Ongoing  8/24/2019 0412 by Ting Vieira RN  Outcome: Ongoing

## 2019-08-25 NOTE — ANESTHESIA PRE PROCEDURE
Daily Charleen Hennessy, DO   20 Units at 08/24/19 0936    oxyCODONE-acetaminophen (PERCOCET) 5-325 MG per tablet 1 tablet  1 tablet Oral Q4H PRN Olivia Anderson MD   1 tablet at 08/24/19 2304    morphine (PF) injection 2 mg  2 mg Intravenous Q4H PRN Olivia Anderson MD        insulin lispro (HUMALOG) injection vial 0-12 Units  0-12 Units Subcutaneous TID WC Gus Moore MD   2 Units at 08/24/19 1648    insulin lispro (HUMALOG) injection vial 0-6 Units  0-6 Units Subcutaneous Nightly Gus Moore MD   3 Units at 08/23/19 2151    acetaminophen (TYLENOL) tablet 1,000 mg  1,000 mg Oral Q8H PRN Gopi Garcia, DO   1,000 mg at 08/23/19 0111    sodium hypochlorite (DAKINS) external solution   Irrigation Daily Deja Briscoe DPM        enoxaparin (LOVENOX) injection 40 mg  40 mg Subcutaneous Daily Charleen Hennessy, DO   40 mg at 08/24/19 0937    piperacillin-tazobactam (ZOSYN) 3.375 g in dextrose 5 % 50 mL IVPB (mini-bag)  3.375 g Intravenous Q8H Charleen Hennessy, DO   Stopped at 08/25/19 0100    aspirin chewable tablet 81 mg  81 mg Oral Daily Charleen Hennessy, DO   81 mg at 08/24/19 9005    atorvastatin (LIPITOR) tablet 40 mg  40 mg Oral Nightly Charleen Hennessy, DO   40 mg at 08/24/19 2306    famotidine (PEPCID) tablet 20 mg  20 mg Oral BID Charleen Hennessy, DO   20 mg at 08/24/19 6449    [Held by provider] furosemide (LASIX) tablet 20 mg  20 mg Oral Daily Charleen Hennessy, DO   20 mg at 08/24/19 1783    lisinopril (PRINIVIL;ZESTRIL) tablet 10 mg  10 mg Oral Daily Charleen Hennessy, DO   10 mg at 08/24/19 9796    metoprolol tartrate (LOPRESSOR) tablet 25 mg  25 mg Oral BID Charleen Hennessy, DO   25 mg at 08/24/19 2312    traZODone (DESYREL) tablet 50 mg  50 mg Oral Nightly Charleen Hennessy, DO   50 mg at 08/24/19 2306    glucose (GLUTOSE) 40 % oral gel 15 g  15 g Oral PRN Charleen Hennessy, DO        dextrose 50 % IV solution  12.5 g Intravenous PRN Charleen Hennessy, DO        glucagon (rDNA) injection 1 mg  1 mg Intramuscular PRN Charleen NOGUERA ARTERY BYPASS X4, ON PUMP ,CHEST LEFT OPEN; SWAN, ANGELA PER ANESTHESIA performed by Dion Lewis MD at [facility] Right    [facility]      unsure if correct known tibial surgery    HARDWARE REMOVAL Right 2019    R Knee internal screw protruding through skin    HARDWARE REMOVAL Right 2019    HARDWARE REMOVAL RIGHT TIBIA,    RECONSTRUCTIVE REPAIR STERNAL N/A 2019    STERNAL  WOUND WASHOUT AND CLOSURE S/P CABG performed by Dion Lewis MD at [address] N/A 2019    HARDWARE REMOVAL RIGHT TIBIA, IRRIGATION AND DEBRIDEMENT [facility] performed by Celestino Roblero MD at [address] Right 2000    [place]ile R[place] off roof, external fixation and internal hardware    TONSILLECTOMY  1974       Social History:    Social History     Tobacco Use    Smoking status: Former Smoker     Packs/day: 1.00     Years: 20.00     Pack years: 20.00     Last attempt to quit: 2019     Years since quittin.3    Smokeless tobacco: Never Used   Substance Use Topics    Alcohol use: No     Comment: RECOVERING X 20 YEARS                                Counseling given: Not Answered      Vital Signs (Current): There were no vitals filed for this visit.                                            BP Readings from Last 3 Encounters:   19 (!) 156/65   19 138/77   19 (!) 157/78       NPO Status:                                                                                 BMI:   Wt Readings from Last 3 Encounters:   19 142 lb 11.2 oz (64.7 kg)   19 143 lb (64.9 kg)   19 144 lb 3.2 oz (65.4 kg)     There is no height or weight on file to calculate BMI.    CBC:   Lab Results   Component Value Date    WBC 18.7 2019    RBC 3.95 2019    HGB 11.3 2019    HCT 35.2 2019    MCV 89.1 2019    RDW 12.7 2019     2019       CMP:   Lab Results   Component

## 2019-08-26 ENCOUNTER — ANESTHESIA EVENT (OUTPATIENT)
Dept: OPERATING ROOM | Age: 54
DRG: 305 | End: 2019-08-26
Payer: MEDICAID

## 2019-08-26 LAB
ANION GAP SERPL CALCULATED.3IONS-SCNC: 9 MMOL/L (ref 9–17)
BUN BLDV-MCNC: 6 MG/DL (ref 6–20)
BUN/CREAT BLD: ABNORMAL (ref 9–20)
CALCIUM SERPL-MCNC: 9.1 MG/DL (ref 8.6–10.4)
CHLORIDE BLD-SCNC: 100 MMOL/L (ref 98–107)
CO2: 28 MMOL/L (ref 20–31)
CREAT SERPL-MCNC: 0.46 MG/DL (ref 0.7–1.2)
GFR AFRICAN AMERICAN: >60 ML/MIN
GFR NON-AFRICAN AMERICAN: >60 ML/MIN
GFR SERPL CREATININE-BSD FRML MDRD: ABNORMAL ML/MIN/{1.73_M2}
GFR SERPL CREATININE-BSD FRML MDRD: ABNORMAL ML/MIN/{1.73_M2}
GLUCOSE BLD-MCNC: 173 MG/DL (ref 75–110)
GLUCOSE BLD-MCNC: 177 MG/DL (ref 75–110)
GLUCOSE BLD-MCNC: 204 MG/DL (ref 70–99)
GLUCOSE BLD-MCNC: 213 MG/DL (ref 75–110)
GLUCOSE BLD-MCNC: 294 MG/DL (ref 75–110)
HCT VFR BLD CALC: 37 % (ref 40.7–50.3)
HEMOGLOBIN: 11.8 G/DL (ref 13–17)
MCH RBC QN AUTO: 28.7 PG (ref 25.2–33.5)
MCHC RBC AUTO-ENTMCNC: 31.9 G/DL (ref 28.4–34.8)
MCV RBC AUTO: 90 FL (ref 82.6–102.9)
NRBC AUTOMATED: 0 PER 100 WBC
PDW BLD-RTO: 12.4 % (ref 11.8–14.4)
PLATELET # BLD: 566 K/UL (ref 138–453)
PMV BLD AUTO: 9.1 FL (ref 8.1–13.5)
POTASSIUM SERPL-SCNC: 4.8 MMOL/L (ref 3.7–5.3)
RBC # BLD: 4.11 M/UL (ref 4.21–5.77)
SODIUM BLD-SCNC: 137 MMOL/L (ref 135–144)
WBC # BLD: 9.4 K/UL (ref 3.5–11.3)

## 2019-08-26 PROCEDURE — 99232 SBSQ HOSP IP/OBS MODERATE 35: CPT | Performed by: INTERNAL MEDICINE

## 2019-08-26 PROCEDURE — 97530 THERAPEUTIC ACTIVITIES: CPT

## 2019-08-26 PROCEDURE — 6370000000 HC RX 637 (ALT 250 FOR IP): Performed by: INTERNAL MEDICINE

## 2019-08-26 PROCEDURE — 1200000000 HC SEMI PRIVATE

## 2019-08-26 PROCEDURE — 97110 THERAPEUTIC EXERCISES: CPT

## 2019-08-26 PROCEDURE — 6360000002 HC RX W HCPCS: Performed by: STUDENT IN AN ORGANIZED HEALTH CARE EDUCATION/TRAINING PROGRAM

## 2019-08-26 PROCEDURE — 2580000003 HC RX 258: Performed by: STUDENT IN AN ORGANIZED HEALTH CARE EDUCATION/TRAINING PROGRAM

## 2019-08-26 PROCEDURE — 85027 COMPLETE CBC AUTOMATED: CPT

## 2019-08-26 PROCEDURE — 2580000003 HC RX 258: Performed by: SURGERY

## 2019-08-26 PROCEDURE — 6370000000 HC RX 637 (ALT 250 FOR IP): Performed by: STUDENT IN AN ORGANIZED HEALTH CARE EDUCATION/TRAINING PROGRAM

## 2019-08-26 PROCEDURE — 99024 POSTOP FOLLOW-UP VISIT: CPT | Performed by: SURGERY

## 2019-08-26 PROCEDURE — 97116 GAIT TRAINING THERAPY: CPT

## 2019-08-26 PROCEDURE — 2500000003 HC RX 250 WO HCPCS: Performed by: STUDENT IN AN ORGANIZED HEALTH CARE EDUCATION/TRAINING PROGRAM

## 2019-08-26 PROCEDURE — 82947 ASSAY GLUCOSE BLOOD QUANT: CPT

## 2019-08-26 PROCEDURE — 80048 BASIC METABOLIC PNL TOTAL CA: CPT

## 2019-08-26 RX ORDER — SODIUM CHLORIDE 9 MG/ML
INJECTION, SOLUTION INTRAVENOUS CONTINUOUS
Status: DISCONTINUED | OUTPATIENT
Start: 2019-08-26 | End: 2019-08-27

## 2019-08-26 RX ORDER — OXYCODONE HYDROCHLORIDE 5 MG/1
5 TABLET ORAL EVERY 4 HOURS PRN
Status: DISCONTINUED | OUTPATIENT
Start: 2019-08-26 | End: 2019-08-27

## 2019-08-26 RX ORDER — ACETAMINOPHEN 500 MG
1000 TABLET ORAL EVERY 8 HOURS SCHEDULED
Status: DISCONTINUED | OUTPATIENT
Start: 2019-08-26 | End: 2019-08-29 | Stop reason: HOSPADM

## 2019-08-26 RX ORDER — BISACODYL 10 MG
10 SUPPOSITORY, RECTAL RECTAL 2 TIMES DAILY
Status: DISCONTINUED | OUTPATIENT
Start: 2019-08-26 | End: 2019-08-27

## 2019-08-26 RX ADMIN — METOPROLOL TARTRATE 25 MG: 25 TABLET ORAL at 09:02

## 2019-08-26 RX ADMIN — ACETAMINOPHEN 1000 MG: 500 TABLET ORAL at 21:05

## 2019-08-26 RX ADMIN — PIPERACILLIN AND TAZOBACTAM 3.38 G: 3; .375 INJECTION, POWDER, FOR SOLUTION INTRAVENOUS at 21:06

## 2019-08-26 RX ADMIN — TRAZODONE HYDROCHLORIDE 50 MG: 50 TABLET ORAL at 21:05

## 2019-08-26 RX ADMIN — INSULIN LISPRO 3 UNITS: 100 INJECTION, SOLUTION INTRAVENOUS; SUBCUTANEOUS at 17:39

## 2019-08-26 RX ADMIN — ATORVASTATIN CALCIUM 40 MG: 80 TABLET, FILM COATED ORAL at 21:05

## 2019-08-26 RX ADMIN — PIPERACILLIN AND TAZOBACTAM 3.38 G: 3; .375 INJECTION, POWDER, FOR SOLUTION INTRAVENOUS at 04:15

## 2019-08-26 RX ADMIN — ASPIRIN 81 MG: 81 TABLET, CHEWABLE ORAL at 09:03

## 2019-08-26 RX ADMIN — OXYCODONE HYDROCHLORIDE 5 MG: 5 TABLET ORAL at 11:23

## 2019-08-26 RX ADMIN — SODIUM CHLORIDE: 9 INJECTION, SOLUTION INTRAVENOUS at 21:13

## 2019-08-26 RX ADMIN — POTASSIUM CHLORIDE, DEXTROSE MONOHYDRATE AND SODIUM CHLORIDE: 150; 5; 450 INJECTION, SOLUTION INTRAVENOUS at 09:06

## 2019-08-26 RX ADMIN — PIPERACILLIN AND TAZOBACTAM 3.38 G: 3; .375 INJECTION, POWDER, FOR SOLUTION INTRAVENOUS at 15:48

## 2019-08-26 RX ADMIN — FAMOTIDINE 20 MG: 20 TABLET, FILM COATED ORAL at 21:05

## 2019-08-26 RX ADMIN — LISINOPRIL 10 MG: 10 TABLET ORAL at 09:02

## 2019-08-26 RX ADMIN — ACETAMINOPHEN 1000 MG: 500 TABLET ORAL at 15:47

## 2019-08-26 RX ADMIN — OXYCODONE HYDROCHLORIDE 5 MG: 5 TABLET ORAL at 20:10

## 2019-08-26 RX ADMIN — INSULIN GLARGINE 20 UNITS: 100 INJECTION, SOLUTION SUBCUTANEOUS at 09:04

## 2019-08-26 RX ADMIN — ENOXAPARIN SODIUM 40 MG: 40 INJECTION SUBCUTANEOUS at 09:02

## 2019-08-26 RX ADMIN — METOPROLOL TARTRATE 25 MG: 25 TABLET ORAL at 21:05

## 2019-08-26 RX ADMIN — FAMOTIDINE 20 MG: 20 TABLET, FILM COATED ORAL at 09:02

## 2019-08-26 RX ADMIN — INSULIN LISPRO 2 UNITS: 100 INJECTION, SOLUTION INTRAVENOUS; SUBCUTANEOUS at 21:06

## 2019-08-26 RX ADMIN — INSULIN LISPRO 6 UNITS: 100 INJECTION, SOLUTION INTRAVENOUS; SUBCUTANEOUS at 11:36

## 2019-08-26 RX ADMIN — OXYCODONE HYDROCHLORIDE AND ACETAMINOPHEN 1 TABLET: 5; 325 TABLET ORAL at 06:31

## 2019-08-26 RX ADMIN — INSULIN LISPRO 4 UNITS: 100 INJECTION, SOLUTION INTRAVENOUS; SUBCUTANEOUS at 09:03

## 2019-08-26 RX ADMIN — OXYCODONE HYDROCHLORIDE 5 MG: 5 TABLET ORAL at 15:47

## 2019-08-26 ASSESSMENT — PAIN SCALES - GENERAL
PAINLEVEL_OUTOF10: 6
PAINLEVEL_OUTOF10: 5
PAINLEVEL_OUTOF10: 4
PAINLEVEL_OUTOF10: 4
PAINLEVEL_OUTOF10: 7

## 2019-08-26 NOTE — PROGRESS NOTES
 High Blood Pressure Brother        Vitals:  BP (!) 153/87   Pulse 75   Temp 97.9 °F (36.6 °C) (Oral)   Resp 15   Ht 5' 5\" (1.651 m)   Wt 142 lb 11.2 oz (64.7 kg)   SpO2 97%   BMI 23.75 kg/m²   Temp (24hrs), Av °F (36.7 °C), Min:97.9 °F (36.6 °C), Max:98.1 °F (36.7 °C)    Recent Labs     19  1131 19  16219  0658   POCGLU 191* 159* 260* 213*       I/O (24Hr): Intake/Output Summary (Last 24 hours) at 2019 1119  Last data filed at 2019 0600  Gross per 24 hour   Intake 1978 ml   Output 4090 ml   Net -2112 ml       Labs:  Hematology:  Recent Labs     19  0532 19  0619  0537   WBC 18.7* 17.3* 9.4   RBC 3.95* 3.95* 4.11*   HGB 11.3* 10.9* 11.8*   HCT 35.2* 35.8* 37.0*   MCV 89.1 90.6 90.0   MCH 28.6 27.6 28.7   MCHC 32.1 30.4 31.9   RDW 12.7 12.5 12.4   * 429 566*   MPV 9.8 10.0 9.1     Chemistry:  Recent Labs     19  0532 19  0627 19  0537    134* 137   K 4.1 3.7 4.8   CL 99 97* 100   CO2 27 25 28   GLUCOSE 166* 244* 204*   BUN 7 8 6   CREATININE 0.55* 0.54* 0.46*   ANIONGAP 12 12 9   LABGLOM >60 >60 >60   GFRAA >60 >60 >60   CALCIUM 8.6 8.8 9.1     Recent Labs     19  0656 19  0915 19  1131 19  16219  0658   POCGLU 216* 189* 191* 159* 260* 213*     ABG:  Lab Results   Component Value Date    POCPH 7.451 2019    POCPCO2 44.1 2019    POCPO2 72.4 2019    POCHCO3 30.8 2019    NBEA NOT REPORTED 2019    PBEA 6 2019    HFJ7JKT 32 2019    UIJN4QHK 95 2019    FIO2 40.0 2019     Lab Results   Component Value Date/Time    SPECIAL NOT REPORTED 2019 04:08 PM     Lab Results   Component Value Date/Time    CULTURE (A) 2019 04:08 PM     STAPHYLOCOCCUS SPECIES, COAGULASE NEGATIVE SCANT GROWTH Susceptibilities have not been performed. Notify the laboratory within 7 days for further workup if clinically indicated.

## 2019-08-26 NOTE — PROGRESS NOTES
06/2019    Osteolysis 04/07/2019    R Knee hardware    PEA (Pulseless electrical activity) ([facility] 04/07/2019    PVD (peripheral vascular disease) ([facility] 04/07/2019       Past Surgical  History:     Past Surgical History:   Procedure Laterality Date    CARDIAC CATHETERIZATION  04/07/2019    MVD-Stv    CAROTID STENT PLACEMENT  07/03/2019    ACCULINK CAROTID STENT PLACED, MRI CONDITIONAL 5, 3T OK.      CORONARY ARTERY BYPASS GRAFT  04/07/2019    x4 per Dr. Neil Domingo, [address] [place] [address] GRAFT N/A 4/7/2019    EMERGENT CPR,  CORONARY ARTERY BYPASS X4, ON PUMP ,CHEST LEFT OPEN; SWAN, ANGELA PER ANESTHESIA performed by Omero Nolan MD at [address] [facility] Right 2000   [address]      unsure if correct known tibial surgery    HARDWARE REMOVAL Right 04/07/2019    R Knee internal screw protruding through skin    HARDWARE REMOVAL Right 04/22/2019    HARDWARE REMOVAL RIGHT TIBIA,    LEG AMPUTATION BELOW KNEE Right 8/25/2019    RIGHT FOOT GUILLOTINE AMPUTATION performed by Yusra Shetty MD at [place] [address] N/A 4/8/2019    STERNAL  WOUND WASHOUT AND CLOSURE S/P CABG performed by Omero Nolan MD at [address] [place] [address] N/A 4/22/2019    HARDWARE REMOVAL RIGHT TIBIA, IRRIGATION AND DEBRIDEMENT [address] performed by John Fitzgerald MD at [address] Right 2000    Fell off roof, external fixation and internal hardware    TONSILLECTOMY  1974       Medications:      acetaminophen  1,000 mg Oral 3 times per day    bisacodyl  10 mg Rectal BID    insulin glargine  20 Units Subcutaneous Daily    insulin lispro  0-12 Units Subcutaneous TID WC    insulin lispro  0-6 Units Subcutaneous Nightly    sodium hypochlorite   Irrigation Daily    enoxaparin  40 mg Subcutaneous Daily    piperacillin-tazobactam  3.375 g Intravenous Q8H    aspirin  81 mg Oral Daily    atorvastatin  40 mg Oral

## 2019-08-26 NOTE — PROGRESS NOTES
Received call from RN regarding bleeding from RLE guillotine dressing which was changed this morning and 1454 today. Pt continues to be stable without change in vitals. Evaluated pt at 1500 and noted blood which had saturated dressing and was seeping out at the top of the dressing above the knee. Dressing removed. There was no bleeding requiring suture ligation.      Surgicel re-applied to the wound (which had been removed during the morning and the 1454 dressing changes), and pressure dressing applied using koban/kerlix. ACE reapplied. Will re-evaluate in several hours and obtain consent for formalization of BKA which is planned for tomorrow (08/27/19). Massimo Pavon, MS-4    -----      Agree with the above. Pressure dressing in place with surgicel. No active bleeding or sites requiring ligation. Keep in bed for this evening with surgicel dressing in place. Will take down again in the morning and replace. Plan for BKA tomorrow.       Electronically signed by Erlinda Johnston DO on 8/26/2019 at 5:22 PM

## 2019-08-26 NOTE — ANESTHESIA PRE PROCEDURE
DO        dextrose 5 % solution  100 mL/hr Intravenous PRN Andrew Santoer, DO           Allergies:  No Known Allergies    Problem List:    Patient Active Problem List   Diagnosis Code    PAD (peripheral artery disease) ([facility]) I73.9    Subacute osteomyelitis of right tibia ([facility]) S37.212    Metabolic encephalopathy A61.30    Abnormal CT of the head R93.0    Hepatitis K75.9    Acute cerebral infarction associated with systemic hypoxia or ischemia ([facility]) I63.89    Multi infarct state I69.30    Carotid stenosis, bilateral I65.23    Right foot drop M21.371    Thrombocytosis ([facility]) D47.3    Acute CVA (cerebrovascular accident) ([facility] I63.9    Moderate malnutrition ([facility]) E44.0    Cellulitis of right lower extremity L03.115    Right-sided extracranial carotid artery stenosis I65.21    Essential hypertension I10    Type 2 diabetes mellitus with diabetic neuropathy, without long-term current use of insulin ([facility]) E11.40    Leukocytosis D72.829    Status post angiography of extremity Z98.890    Ischemic gangrene ([facility]) I96    Pyogenic inflammation of bone ([facility]) M86.9    Coronary artery disease I25.10       Past Medical History:        Diagnosis Date    CAD (coronary artery disease) 04/07/2019    Cardiac arrest ([facility] 04/07/2019    Diabetes mellitus ([facility] 04/2019    ON RX    Hyperlipidemia 04/2019    ON RX HAS NOT HAD SINCE 06/30/2019    Hypertension 04/2019    ON RX NONE SINCE 06/2019    Osteolysis 04/07/2019    R Knee hardware    PEA (Pulseless electrical activity) ([facility] 04/07/2019    PVD (peripheral vascular disease) ([facility] 04/07/2019       Past Surgical History:        Procedure Laterality Date    CARDIAC CATHETERIZATION  04/07/2019    MVD-Stv    CAROTID STENT PLACEMENT  07/03/2019    ACCULINK CAROTID STENT PLACED, MRI CONDITIONAL 5, 3T OK.      CORONARY ARTERY BYPASS GRAFT  04/07/2019    x4 per Dr. Trudi Peraza, [address] [place] [address] GRAFT N/A 4/7/2019    EMERGENT CPR,  CORONARY ARTERY BYPASS X4, ON PUMP ,CHEST LEFT OPEN; ANETTE, ANGELA PER ANESTHESIA performed by Madhuri Garland MD at [address] [facility] Right    [address] [facility]      unsure if correct known tibial surgery    HARDWARE REMOVAL Right 2019    R Knee internal screw protruding through skin    HARDWARE REMOVAL Right 2019    HARDWARE REMOVAL RIGHT TIBIA,    LEG AMPUTATION BELOW KNEE Right 2019    RIGHT FOOT GUILLOTINE AMPUTATION performed by Roxanne Fitzpatrick MD at [place] [address] N/A 2019    STERNAL  WOUND WASHOUT AND CLOSURE S/P CABG performed by Madhuri Garland MD at [address] [place] [address] N/A 2019    HARDWARE REMOVAL RIGHT TIBIA, IRRIGATION AND DEBRIDEMENT [facility] performed by Amber Noe MD at [address] [place] [address] Right 2000    [place] off roof, external fixation and internal hardware    TONSILLECTOMY  1974       Social History:    Social History     Tobacco Use    Smoking status: Former Smoker     Packs/day: 1.00     Years: 20.00     Pack years: 20.00     Last attempt to quit: 2019     Years since quittin.3    Smokeless tobacco: Never Used   Substance Use Topics    Alcohol use: No     Comment: RECOVERING X 20 YEARS                                Counseling given: Not Answered      Vital Signs (Current):   Vitals:    19 2011 19 0630 19 0745 19 0831   BP: (!) 167/69  122/78 105/61   Pulse: 81  88 78   Resp: 17  16 16   Temp: 97.6 °F (36.4 °C)      TempSrc: Axillary      SpO2: 98%   96%   Weight:  138 lb 6.4 oz (62.8 kg)     Height:                                                  BP Readings from Last 3 Encounters:   19 105/61   19 (!) 112/58   19 138/77       NPO Status: Time of last liquid consumption: 0800                        Time of last solid consumption: 1800                        Date of last liquid consumption: 19

## 2019-08-26 NOTE — PROGRESS NOTES
Physical Therapy  Facility/Department: [facility] CAR 2  Daily Treatment Note  NAME: Lynne Veliz  : 1965  MRN: [id number]    Date of Service: 2019    Discharge Recommendations:  Further therapy recommended at discharge and the patient should be able to tolerate at least 3 hours per day over 5 days or 15 hours over 7 days. Assessment   Body structures, Functions, Activity limitations: Decreased functional mobility ; Decreased balance;Decreased endurance;Decreased ROM; Decreased strength;Decreased sensation; Increased Pain;Decreased safe awareness  Assessment: Pt amb 10ftx2 and 30ft x2 using RW and MIn A for safety, Limited by Bleeding on incision, Pt grossly steady and verbalizes motivation to ambulate longer distance . Pt would benefit from more PT to address defict. Prognosis: Fair  Decision Making: Medium Complexity  PT Education: Plan of Care;Precautions; Home Exercise Program;Energy Conservation;Transfer Training;Gait Training  REQUIRES PT FOLLOW UP: Yes  Activity Tolerance  Activity Tolerance: Patient Tolerated treatment well; Other  Activity Tolerance: Bleeding at Incision site. Patient Diagnosis(es): The encounter diagnosis was Status post angiography of extremity. has a past medical history of CAD (coronary artery disease), Cardiac arrest ([facility] Diabetes mellitus ([facility] Hyperlipidemia, Hypertension, Osteolysis, PEA (Pulseless electrical activity) ([address] and PVD (peripheral vascular disease) ([facility] has a past surgical history that includes Femur fracture surgery (Right, ); Cardiac catheterization (2019); Femur Surgery; Tonsillectomy (); Coronary artery bypass graft (2019); Hardware Removal (Right, 2019); Tibia fracture surgery (Right, ); Coronary artery bypass graft (N/A, 2019); RECONSTRUCTIVE REPAIR STERNAL (N/A, 2019); Hardware Removal (Right, 2019); REMOVE HARDWARE FEMUR (N/A, 2019);  Carotid stent placement (2019); and Leg amputation

## 2019-08-27 ENCOUNTER — ANESTHESIA (OUTPATIENT)
Dept: OPERATING ROOM | Age: 54
DRG: 305 | End: 2019-08-27
Payer: MEDICAID

## 2019-08-27 VITALS — TEMPERATURE: 97.8 F | SYSTOLIC BLOOD PRESSURE: 129 MMHG | DIASTOLIC BLOOD PRESSURE: 69 MMHG | OXYGEN SATURATION: 100 %

## 2019-08-27 LAB
GLUCOSE BLD-MCNC: 119 MG/DL (ref 75–110)
GLUCOSE BLD-MCNC: 128 MG/DL (ref 75–110)
GLUCOSE BLD-MCNC: 139 MG/DL (ref 75–110)
GLUCOSE BLD-MCNC: 279 MG/DL (ref 75–110)
SURGICAL PATHOLOGY REPORT: NORMAL

## 2019-08-27 PROCEDURE — 94770 HC ETCO2 MONITOR DAILY: CPT

## 2019-08-27 PROCEDURE — 2500000003 HC RX 250 WO HCPCS

## 2019-08-27 PROCEDURE — 6370000000 HC RX 637 (ALT 250 FOR IP): Performed by: STUDENT IN AN ORGANIZED HEALTH CARE EDUCATION/TRAINING PROGRAM

## 2019-08-27 PROCEDURE — 2700000000 HC OXYGEN THERAPY PER DAY

## 2019-08-27 PROCEDURE — 3700000000 HC ANESTHESIA ATTENDED CARE: Performed by: SURGERY

## 2019-08-27 PROCEDURE — 99232 SBSQ HOSP IP/OBS MODERATE 35: CPT | Performed by: INTERNAL MEDICINE

## 2019-08-27 PROCEDURE — 0Y6H0Z3 DETACHMENT AT RIGHT LOWER LEG, LOW, OPEN APPROACH: ICD-10-PCS | Performed by: SURGERY

## 2019-08-27 PROCEDURE — 6360000002 HC RX W HCPCS: Performed by: STUDENT IN AN ORGANIZED HEALTH CARE EDUCATION/TRAINING PROGRAM

## 2019-08-27 PROCEDURE — 2709999900 HC NON-CHARGEABLE SUPPLY: Performed by: SURGERY

## 2019-08-27 PROCEDURE — 6360000002 HC RX W HCPCS

## 2019-08-27 PROCEDURE — 82947 ASSAY GLUCOSE BLOOD QUANT: CPT

## 2019-08-27 PROCEDURE — 7100000000 HC PACU RECOVERY - FIRST 15 MIN

## 2019-08-27 PROCEDURE — 2580000003 HC RX 258: Performed by: STUDENT IN AN ORGANIZED HEALTH CARE EDUCATION/TRAINING PROGRAM

## 2019-08-27 PROCEDURE — 99024 POSTOP FOLLOW-UP VISIT: CPT | Performed by: SURGERY

## 2019-08-27 PROCEDURE — 3700000001 HC ADD 15 MINUTES (ANESTHESIA): Performed by: SURGERY

## 2019-08-27 PROCEDURE — 2580000003 HC RX 258: Performed by: SURGERY

## 2019-08-27 PROCEDURE — 27886 AMPUTATION FOLLOW-UP SURGERY: CPT | Performed by: SURGERY

## 2019-08-27 PROCEDURE — 7100000001 HC PACU RECOVERY - ADDTL 15 MIN

## 2019-08-27 PROCEDURE — 1200000000 HC SEMI PRIVATE

## 2019-08-27 PROCEDURE — 3600000005 HC SURGERY LEVEL 5 BASE: Performed by: SURGERY

## 2019-08-27 PROCEDURE — 3600000015 HC SURGERY LEVEL 5 ADDTL 15MIN: Performed by: SURGERY

## 2019-08-27 PROCEDURE — 88307 TISSUE EXAM BY PATHOLOGIST: CPT

## 2019-08-27 PROCEDURE — 88311 DECALCIFY TISSUE: CPT

## 2019-08-27 RX ORDER — FENTANYL CITRATE 50 UG/ML
50 INJECTION, SOLUTION INTRAMUSCULAR; INTRAVENOUS EVERY 5 MIN PRN
Status: DISCONTINUED | OUTPATIENT
Start: 2019-08-27 | End: 2019-08-27 | Stop reason: HOSPADM

## 2019-08-27 RX ORDER — EPHEDRINE SULFATE 50 MG/ML
INJECTION, SOLUTION INTRAVENOUS PRN
Status: DISCONTINUED | OUTPATIENT
Start: 2019-08-27 | End: 2019-08-27

## 2019-08-27 RX ORDER — FENTANYL CITRATE 50 UG/ML
INJECTION, SOLUTION INTRAMUSCULAR; INTRAVENOUS PRN
Status: DISCONTINUED | OUTPATIENT
Start: 2019-08-27 | End: 2019-08-27 | Stop reason: SDUPTHER

## 2019-08-27 RX ORDER — KETOROLAC TROMETHAMINE 30 MG/ML
30 INJECTION, SOLUTION INTRAMUSCULAR; INTRAVENOUS EVERY 6 HOURS
Status: DISCONTINUED | OUTPATIENT
Start: 2019-08-27 | End: 2019-08-28

## 2019-08-27 RX ORDER — FENTANYL CITRATE 50 UG/ML
25 INJECTION, SOLUTION INTRAMUSCULAR; INTRAVENOUS EVERY 5 MIN PRN
Status: DISCONTINUED | OUTPATIENT
Start: 2019-08-27 | End: 2019-08-27 | Stop reason: HOSPADM

## 2019-08-27 RX ORDER — GABAPENTIN 300 MG/1
300 CAPSULE ORAL EVERY 8 HOURS
Status: DISCONTINUED | OUTPATIENT
Start: 2019-08-27 | End: 2019-08-29 | Stop reason: HOSPADM

## 2019-08-27 RX ORDER — LIDOCAINE HYDROCHLORIDE 10 MG/ML
INJECTION, SOLUTION EPIDURAL; INFILTRATION; INTRACAUDAL; PERINEURAL PRN
Status: DISCONTINUED | OUTPATIENT
Start: 2019-08-27 | End: 2019-08-27 | Stop reason: SDUPTHER

## 2019-08-27 RX ORDER — EPHEDRINE SULFATE/0.9% NACL/PF 50 MG/5 ML
SYRINGE (ML) INTRAVENOUS PRN
Status: DISCONTINUED | OUTPATIENT
Start: 2019-08-27 | End: 2019-08-27 | Stop reason: SDUPTHER

## 2019-08-27 RX ORDER — FUROSEMIDE 20 MG/1
20 TABLET ORAL DAILY
Status: DISCONTINUED | OUTPATIENT
Start: 2019-08-28 | End: 2019-08-29 | Stop reason: HOSPADM

## 2019-08-27 RX ORDER — MIDAZOLAM HYDROCHLORIDE 1 MG/ML
INJECTION INTRAMUSCULAR; INTRAVENOUS PRN
Status: DISCONTINUED | OUTPATIENT
Start: 2019-08-27 | End: 2019-08-27 | Stop reason: SDUPTHER

## 2019-08-27 RX ORDER — MAGNESIUM HYDROXIDE 1200 MG/15ML
LIQUID ORAL CONTINUOUS PRN
Status: COMPLETED | OUTPATIENT
Start: 2019-08-27 | End: 2019-08-27

## 2019-08-27 RX ORDER — MEPERIDINE HYDROCHLORIDE 50 MG/ML
12.5 INJECTION INTRAMUSCULAR; INTRAVENOUS; SUBCUTANEOUS EVERY 5 MIN PRN
Status: DISCONTINUED | OUTPATIENT
Start: 2019-08-27 | End: 2019-08-27 | Stop reason: HOSPADM

## 2019-08-27 RX ORDER — ONDANSETRON 2 MG/ML
INJECTION INTRAMUSCULAR; INTRAVENOUS PRN
Status: DISCONTINUED | OUTPATIENT
Start: 2019-08-27 | End: 2019-08-27 | Stop reason: SDUPTHER

## 2019-08-27 RX ORDER — PROPOFOL 10 MG/ML
INJECTION, EMULSION INTRAVENOUS PRN
Status: DISCONTINUED | OUTPATIENT
Start: 2019-08-27 | End: 2019-08-27 | Stop reason: SDUPTHER

## 2019-08-27 RX ORDER — ONDANSETRON 2 MG/ML
4 INJECTION INTRAMUSCULAR; INTRAVENOUS
Status: DISCONTINUED | OUTPATIENT
Start: 2019-08-27 | End: 2019-08-27 | Stop reason: HOSPADM

## 2019-08-27 RX ORDER — NALOXONE HYDROCHLORIDE 0.4 MG/ML
0.4 INJECTION, SOLUTION INTRAMUSCULAR; INTRAVENOUS; SUBCUTANEOUS PRN
Status: DISCONTINUED | OUTPATIENT
Start: 2019-08-27 | End: 2019-08-28

## 2019-08-27 RX ADMIN — ONDANSETRON 4 MG: 2 INJECTION, SOLUTION INTRAMUSCULAR; INTRAVENOUS at 12:55

## 2019-08-27 RX ADMIN — ATORVASTATIN CALCIUM 40 MG: 80 TABLET, FILM COATED ORAL at 20:49

## 2019-08-27 RX ADMIN — PROPOFOL 180 MG: 10 INJECTION, EMULSION INTRAVENOUS at 12:04

## 2019-08-27 RX ADMIN — METOPROLOL TARTRATE 25 MG: 25 TABLET ORAL at 08:08

## 2019-08-27 RX ADMIN — PHENYLEPHRINE HYDROCHLORIDE 50 MCG: 10 INJECTION INTRAVENOUS at 12:48

## 2019-08-27 RX ADMIN — ACETAMINOPHEN 1000 MG: 500 TABLET ORAL at 20:48

## 2019-08-27 RX ADMIN — METOPROLOL TARTRATE 25 MG: 25 TABLET ORAL at 20:48

## 2019-08-27 RX ADMIN — TRAZODONE HYDROCHLORIDE 50 MG: 50 TABLET ORAL at 20:49

## 2019-08-27 RX ADMIN — PHENYLEPHRINE HYDROCHLORIDE 150 MCG: 10 INJECTION INTRAVENOUS at 12:10

## 2019-08-27 RX ADMIN — PHENYLEPHRINE HYDROCHLORIDE 50 MCG: 10 INJECTION INTRAVENOUS at 12:29

## 2019-08-27 RX ADMIN — PHENYLEPHRINE HYDROCHLORIDE 200 MCG: 10 INJECTION INTRAVENOUS at 12:14

## 2019-08-27 RX ADMIN — KETOROLAC TROMETHAMINE 30 MG: 30 INJECTION, SOLUTION INTRAMUSCULAR; INTRAVENOUS at 14:06

## 2019-08-27 RX ADMIN — PIPERACILLIN AND TAZOBACTAM 3.38 G: 3; .375 INJECTION, POWDER, FOR SOLUTION INTRAVENOUS at 04:18

## 2019-08-27 RX ADMIN — GABAPENTIN 300 MG: 300 CAPSULE ORAL at 17:17

## 2019-08-27 RX ADMIN — PIPERACILLIN AND TAZOBACTAM 3.38 G: 3; .375 INJECTION, POWDER, FOR SOLUTION INTRAVENOUS at 20:49

## 2019-08-27 RX ADMIN — PHENYLEPHRINE HYDROCHLORIDE 100 MCG: 10 INJECTION INTRAVENOUS at 12:12

## 2019-08-27 RX ADMIN — LIDOCAINE HYDROCHLORIDE 50 MG: 10 INJECTION, SOLUTION EPIDURAL; INFILTRATION; INTRACAUDAL at 12:04

## 2019-08-27 RX ADMIN — PHENYLEPHRINE HYDROCHLORIDE 100 MCG: 10 INJECTION INTRAVENOUS at 12:59

## 2019-08-27 RX ADMIN — FENTANYL CITRATE 50 MCG: 50 INJECTION, SOLUTION INTRAMUSCULAR; INTRAVENOUS at 12:04

## 2019-08-27 RX ADMIN — FAMOTIDINE 20 MG: 20 TABLET, FILM COATED ORAL at 20:49

## 2019-08-27 RX ADMIN — FENTANYL CITRATE 50 MCG: 50 INJECTION, SOLUTION INTRAMUSCULAR; INTRAVENOUS at 12:18

## 2019-08-27 RX ADMIN — MORPHINE SULFATE 2 MG: 2 INJECTION, SOLUTION INTRAMUSCULAR; INTRAVENOUS at 13:33

## 2019-08-27 RX ADMIN — PHENYLEPHRINE HYDROCHLORIDE 50 MCG: 10 INJECTION INTRAVENOUS at 12:07

## 2019-08-27 RX ADMIN — GABAPENTIN 300 MG: 300 CAPSULE ORAL at 22:43

## 2019-08-27 RX ADMIN — PHENYLEPHRINE HYDROCHLORIDE 100 MCG: 10 INJECTION INTRAVENOUS at 13:05

## 2019-08-27 RX ADMIN — OXYCODONE HYDROCHLORIDE 5 MG: 5 TABLET ORAL at 00:35

## 2019-08-27 RX ADMIN — KETOROLAC TROMETHAMINE 30 MG: 30 INJECTION, SOLUTION INTRAMUSCULAR; INTRAVENOUS at 20:49

## 2019-08-27 RX ADMIN — MIDAZOLAM HYDROCHLORIDE 2 MG: 1 INJECTION, SOLUTION INTRAMUSCULAR; INTRAVENOUS at 12:04

## 2019-08-27 RX ADMIN — Medication 10 MG: at 12:13

## 2019-08-27 RX ADMIN — PHENYLEPHRINE HYDROCHLORIDE 100 MCG: 10 INJECTION INTRAVENOUS at 12:22

## 2019-08-27 RX ADMIN — ACETAMINOPHEN 1000 MG: 500 TABLET ORAL at 05:56

## 2019-08-27 RX ADMIN — SODIUM CHLORIDE: 9 INJECTION, SOLUTION INTRAVENOUS at 12:44

## 2019-08-27 RX ADMIN — INSULIN LISPRO 9 UNITS: 100 INJECTION, SOLUTION INTRAVENOUS; SUBCUTANEOUS at 17:18

## 2019-08-27 RX ADMIN — PHENYLEPHRINE HYDROCHLORIDE 100 MCG: 10 INJECTION INTRAVENOUS at 12:53

## 2019-08-27 ASSESSMENT — PULMONARY FUNCTION TESTS
PIF_VALUE: 18
PIF_VALUE: 14
PIF_VALUE: 4
PIF_VALUE: 19
PIF_VALUE: 15
PIF_VALUE: 18
PIF_VALUE: 14
PIF_VALUE: 1
PIF_VALUE: 14
PIF_VALUE: 19
PIF_VALUE: 1
PIF_VALUE: 17
PIF_VALUE: 14
PIF_VALUE: 3
PIF_VALUE: 16
PIF_VALUE: 15
PIF_VALUE: 14
PIF_VALUE: 11
PIF_VALUE: 13
PIF_VALUE: 15
PIF_VALUE: 13
PIF_VALUE: 14
PIF_VALUE: 14
PIF_VALUE: 1
PIF_VALUE: 14
PIF_VALUE: 16
PIF_VALUE: 16
PIF_VALUE: 15
PIF_VALUE: 14
PIF_VALUE: 1
PIF_VALUE: 14
PIF_VALUE: 1
PIF_VALUE: 14
PIF_VALUE: 17
PIF_VALUE: 18
PIF_VALUE: 3
PIF_VALUE: 15
PIF_VALUE: 14
PIF_VALUE: 13
PIF_VALUE: 17
PIF_VALUE: 14
PIF_VALUE: 20
PIF_VALUE: 16
PIF_VALUE: 18
PIF_VALUE: 13
PIF_VALUE: 14
PIF_VALUE: 14
PIF_VALUE: 15
PIF_VALUE: 14
PIF_VALUE: 1
PIF_VALUE: 15
PIF_VALUE: 14
PIF_VALUE: 18
PIF_VALUE: 15
PIF_VALUE: 14
PIF_VALUE: 3
PIF_VALUE: 3
PIF_VALUE: 14
PIF_VALUE: 15
PIF_VALUE: 14
PIF_VALUE: 16
PIF_VALUE: 6
PIF_VALUE: 5
PIF_VALUE: 14
PIF_VALUE: 14
PIF_VALUE: 19
PIF_VALUE: 14
PIF_VALUE: 15
PIF_VALUE: 14
PIF_VALUE: 16
PIF_VALUE: 14

## 2019-08-27 ASSESSMENT — PAIN DESCRIPTION - PAIN TYPE
TYPE: SURGICAL PAIN
TYPE: SURGICAL PAIN

## 2019-08-27 ASSESSMENT — PAIN SCALES - GENERAL
PAINLEVEL_OUTOF10: 9
PAINLEVEL_OUTOF10: 5
PAINLEVEL_OUTOF10: 4
PAINLEVEL_OUTOF10: 7
PAINLEVEL_OUTOF10: 4
PAINLEVEL_OUTOF10: 7

## 2019-08-27 NOTE — PROGRESS NOTES
followed by Radu Velasquez on 08/27 or 08/28 per vascular     CURRENT EXAMINATION: 8/26/2019      Afebrile  VS stable    The patient seen and evaluated at bedside. Patient is with good deameanor and cooperative. No acute events to report overnight.      Patient underwent a right guillotine amputation 8/25/2019 by Dr. Frankey Spates. Completed second stage BKA on 08/27. Denies any nausea, vomiting, diarrhea, fever, chest pain, chills, shortness of breath. Bowel movement last night normal.     Patient currently on Zosyn, tolerating well. Will D/c in 24-48 Hr.     HRR, Lungs are clear.     VS: BP: 153/87  WBC within normal limits     Labs, X rays reviewed: 8/25/2019     BUN:6  Cr: 0.48-->0.54--> 0.46     WBC:15.8->16.9-->17.3--> 9.4  Hb: 13.8-->11.4->12.1--> 11.8  Plat: 456->489--> 429 --> 566        Cultures:  Leg tissue:  · 4-22-19 Coag neg staph  Blood:  ·    Sputum :  ·    Wound:  ·       MRI of the right ankle 8/22/2019: Osteomyelitis of the calcaneal tuberosity and likely within the talus. Edema within the midfoot bones equivocal for osteomyelitis versus reactive edema related to Charcot arthropathy. Mild tenosynovitis of the peroneal tendons without significant fluid at the level of the malleoli. Partial tear of the flexor digitorum tendon with mild tenosynovitis. Discussed with patient, RN, family. I have personally reviewed the past medical history, past surgical history, medications, social history, and family history, and I have updated the database accordingly.   Past Medical History:     Past Medical History:   Diagnosis Date    CAD (coronary artery disease) 04/07/2019    Cardiac arrest ([facility] 04/07/2019    Diabetes mellitus ([facility] 04/2019    ON RX    Hyperlipidemia 04/2019    ON RX HAS NOT HAD SINCE 06/30/2019    Hypertension 04/2019    ON RX NONE SINCE 06/2019    Osteolysis 04/07/2019    R Knee hardware    PEA (Pulseless electrical activity) ([facility] 04/07/2019    [facility] Office: [phone]

## 2019-08-27 NOTE — PROGRESS NOTES
Called to bedside again due to bleeding through guillotine dressings. Dressings were changed multiple times today without active bleeding noted. Dressing was taken down. No active bleeding found. Patient is hemodynamically stable. No complaining of dizziness, weakness, pain. Re-dressed with fluff, ABD, coban and ACE wrap. Continue plans for formal R BKA tomorrow.     Nicolas Norton D.O. PGY-2  Department of Surgery  Pager # [phone]  8/26/2019, 8:28 PM

## 2019-08-27 NOTE — FLOWSHEET NOTE
Arrived in room at [phone] from OR for recovery. A/O,appropriate and follows commands. Moves all extremities. Mother at bedside allowed to remain during recovery. IV infusing into RFA #20.  good blood return. immobilizer in place to Rt BKA dressing dry Heels elevated off bed. Taking ice chips and po fluids well. Recovery complete. Report to [facility].

## 2019-08-27 NOTE — PROGRESS NOTES
Pt taken via stretcher to [facility] for preop. Alert and oriented, beta blocker given, all other meds held for NPO/transfer. . BP and HR wnl.    Electronically signed by Sinan Reid RN on 8/27/2019 at 8:16 AM

## 2019-08-27 NOTE — PROGRESS NOTES
Physical Therapy  DATE: 2019    NAME: Vinicio Jacinto  MRN: [id number]   : 1965    Patient not seen this date for Physical Therapy due to:  [] Blood transfusion in progress  [] Hemodialysis  []  Patient Declined  [] Spine Precautions   [] Strict Bedrest  [x] Surgery/ Procedure: ck   [] Testing      [] Other        [] PT being discontinued at this time. Patient independent. No further needs. [] PT being discontinued at this time as the patient has been transferred to palliative care. No further needs.     Ludivina Saez, PT

## 2019-08-28 LAB
GLUCOSE BLD-MCNC: 109 MG/DL (ref 75–110)
GLUCOSE BLD-MCNC: 144 MG/DL (ref 75–110)
GLUCOSE BLD-MCNC: 188 MG/DL (ref 75–110)
GLUCOSE BLD-MCNC: 244 MG/DL (ref 75–110)
SURGICAL PATHOLOGY REPORT: NORMAL

## 2019-08-28 PROCEDURE — 6360000002 HC RX W HCPCS: Performed by: STUDENT IN AN ORGANIZED HEALTH CARE EDUCATION/TRAINING PROGRAM

## 2019-08-28 PROCEDURE — 1200000000 HC SEMI PRIVATE

## 2019-08-28 PROCEDURE — 99024 POSTOP FOLLOW-UP VISIT: CPT | Performed by: SURGERY

## 2019-08-28 PROCEDURE — 97535 SELF CARE MNGMENT TRAINING: CPT

## 2019-08-28 PROCEDURE — 99232 SBSQ HOSP IP/OBS MODERATE 35: CPT | Performed by: INTERNAL MEDICINE

## 2019-08-28 PROCEDURE — 6370000000 HC RX 637 (ALT 250 FOR IP): Performed by: SURGERY

## 2019-08-28 PROCEDURE — 99254 IP/OBS CNSLTJ NEW/EST MOD 60: CPT | Performed by: PHYSICAL MEDICINE & REHABILITATION

## 2019-08-28 PROCEDURE — 6370000000 HC RX 637 (ALT 250 FOR IP): Performed by: STUDENT IN AN ORGANIZED HEALTH CARE EDUCATION/TRAINING PROGRAM

## 2019-08-28 PROCEDURE — 2580000003 HC RX 258: Performed by: STUDENT IN AN ORGANIZED HEALTH CARE EDUCATION/TRAINING PROGRAM

## 2019-08-28 PROCEDURE — 94761 N-INVAS EAR/PLS OXIMETRY MLT: CPT

## 2019-08-28 PROCEDURE — 82947 ASSAY GLUCOSE BLOOD QUANT: CPT

## 2019-08-28 RX ORDER — CLOPIDOGREL BISULFATE 75 MG/1
75 TABLET ORAL DAILY
Status: DISCONTINUED | OUTPATIENT
Start: 2019-08-28 | End: 2019-08-29 | Stop reason: HOSPADM

## 2019-08-28 RX ORDER — OXYCODONE HYDROCHLORIDE AND ACETAMINOPHEN 5; 325 MG/1; MG/1
1 TABLET ORAL EVERY 4 HOURS PRN
Status: DISCONTINUED | OUTPATIENT
Start: 2019-08-28 | End: 2019-08-29

## 2019-08-28 RX ADMIN — GABAPENTIN 300 MG: 300 CAPSULE ORAL at 15:52

## 2019-08-28 RX ADMIN — ACETAMINOPHEN 1000 MG: 500 TABLET ORAL at 13:56

## 2019-08-28 RX ADMIN — ENOXAPARIN SODIUM 40 MG: 40 INJECTION SUBCUTANEOUS at 08:55

## 2019-08-28 RX ADMIN — CLOPIDOGREL 75 MG: 75 TABLET, FILM COATED ORAL at 11:06

## 2019-08-28 RX ADMIN — INSULIN LISPRO 3 UNITS: 100 INJECTION, SOLUTION INTRAVENOUS; SUBCUTANEOUS at 17:15

## 2019-08-28 RX ADMIN — FAMOTIDINE 20 MG: 20 TABLET, FILM COATED ORAL at 08:54

## 2019-08-28 RX ADMIN — INSULIN GLARGINE 20 UNITS: 100 INJECTION, SOLUTION SUBCUTANEOUS at 09:10

## 2019-08-28 RX ADMIN — GABAPENTIN 300 MG: 300 CAPSULE ORAL at 21:39

## 2019-08-28 RX ADMIN — FAMOTIDINE 20 MG: 20 TABLET, FILM COATED ORAL at 21:40

## 2019-08-28 RX ADMIN — INSULIN LISPRO 6 UNITS: 100 INJECTION, SOLUTION INTRAVENOUS; SUBCUTANEOUS at 09:09

## 2019-08-28 RX ADMIN — KETOROLAC TROMETHAMINE 30 MG: 30 INJECTION, SOLUTION INTRAMUSCULAR; INTRAVENOUS at 13:57

## 2019-08-28 RX ADMIN — ACETAMINOPHEN 1000 MG: 500 TABLET ORAL at 06:36

## 2019-08-28 RX ADMIN — GABAPENTIN 300 MG: 300 CAPSULE ORAL at 06:36

## 2019-08-28 RX ADMIN — KETOROLAC TROMETHAMINE 30 MG: 30 INJECTION, SOLUTION INTRAMUSCULAR; INTRAVENOUS at 08:57

## 2019-08-28 RX ADMIN — METOPROLOL TARTRATE 25 MG: 25 TABLET ORAL at 08:54

## 2019-08-28 RX ADMIN — PIPERACILLIN AND TAZOBACTAM 3.38 G: 3; .375 INJECTION, POWDER, FOR SOLUTION INTRAVENOUS at 12:16

## 2019-08-28 RX ADMIN — INSULIN LISPRO 2 UNITS: 100 INJECTION, SOLUTION INTRAVENOUS; SUBCUTANEOUS at 21:40

## 2019-08-28 RX ADMIN — TRAZODONE HYDROCHLORIDE 50 MG: 50 TABLET ORAL at 21:39

## 2019-08-28 RX ADMIN — FUROSEMIDE 20 MG: 20 TABLET ORAL at 08:55

## 2019-08-28 RX ADMIN — PIPERACILLIN AND TAZOBACTAM 3.38 G: 3; .375 INJECTION, POWDER, FOR SOLUTION INTRAVENOUS at 04:10

## 2019-08-28 RX ADMIN — ASPIRIN 81 MG: 81 TABLET, CHEWABLE ORAL at 08:54

## 2019-08-28 RX ADMIN — ATORVASTATIN CALCIUM 40 MG: 80 TABLET, FILM COATED ORAL at 21:40

## 2019-08-28 RX ADMIN — OXYCODONE HYDROCHLORIDE AND ACETAMINOPHEN 1 TABLET: 5; 325 TABLET ORAL at 21:39

## 2019-08-28 RX ADMIN — METOPROLOL TARTRATE 25 MG: 25 TABLET ORAL at 21:39

## 2019-08-28 RX ADMIN — LISINOPRIL 10 MG: 10 TABLET ORAL at 08:55

## 2019-08-28 ASSESSMENT — PAIN SCALES - GENERAL
PAINLEVEL_OUTOF10: 5
PAINLEVEL_OUTOF10: 7
PAINLEVEL_OUTOF10: 5
PAINLEVEL_OUTOF10: 5
PAINLEVEL_OUTOF10: 4
PAINLEVEL_OUTOF10: 7

## 2019-08-28 ASSESSMENT — PAIN DESCRIPTION - PAIN TYPE: TYPE: SURGICAL PAIN

## 2019-08-28 NOTE — PROGRESS NOTES
Occupational Therapy  Facility/Department: [facility] CAR 2  Daily Treatment Note  NAME: Srinivasa Noonan  : 1965  MRN: [id number]    Date of Service: 2019    Discharge Recommendations:    Further therapy recommended at discharge. Assessment   Performance deficits / Impairments: Decreased functional mobility ; Decreased ADL status; Decreased high-level IADLs  Assessment: Pt would benefit acute care and post acute care OT in order to address deficits in functional mobility and ADL/IADL completion. Pt recent BKA limits pt ability to complete functional mobility/tranfers and ADL/IADL/functional activities in safe and IND manner. Pt required CGA for safety with use of RW and cues for safety awareness throughout session. Prognosis: Good  Decision Making: Medium Complexity  OT Education: Plan of Care;OT Role;Transfer Training;IADL Safety  Patient Education: OTPOC, safety- Good return  REQUIRES OT FOLLOW UP: Yes  Activity Tolerance  Activity Tolerance: Patient Tolerated treatment well  Safety Devices  Safety Devices in place: Yes  Type of devices: Call light within reach;Nurse notified; Left in chair;Gait belt;Patient at risk for falls; Chair alarm in place  Restraints  Initially in place: No       Patient Diagnosis(es): The encounter diagnosis was Status post angiography of extremity. has a past medical history of CAD (coronary artery disease), Cardiac arrest ([address] Diabetes mellitus ([address] Hyperlipidemia, Hypertension, Osteolysis, PEA (Pulseless electrical activity) ([address] and PVD (peripheral vascular disease) ([address] has a past surgical history that includes Femur fracture surgery (Right, ); Cardiac catheterization (2019); Femur Surgery; Tonsillectomy (); Coronary artery bypass graft (2019); Hardware Removal (Right, 2019); Tibia fracture surgery (Right, ); Coronary artery bypass graft (N/A, 2019); RECONSTRUCTIVE REPAIR STERNAL (N/A, 2019);  Hardware Removal (Right, 04/22/2019); REMOVE HARDWARE FEMUR (N/A, 4/22/2019); Carotid stent placement (07/03/2019); Leg amputation below knee (Right, 8/25/2019); and Leg amputation below knee (Right, 8/27/2019). Restrictions  Restrictions/Precautions  Restrictions/Precautions: Weight Bearing, General Precautions, Fall Risk  Required Braces or Orthoses?: Yes  Lower Extremity Weight Bearing Restrictions  Right Lower Extremity Weight Bearing: Non Weight Bearing  Partial Weight Bearing Percentage Or Pounds: R BKA 8/25/19  Required Braces or Orthoses  Right Lower Extremity Brace: Boot  RLE Brace Type: Rooke boot to offload RLE  Position Activity Restriction  Other position/activity restrictions: Act as tolerated     Subjective   General  Chart Reviewed: Orders, Progress Notes, History and Physical, Imaging, Labs  Patient assessed for rehabilitation services?: Yes  Family / Caregiver Present: Yes(mother present)  Vital Signs  Patient Currently in Pain: Denies     Orientation  Orientation  Overall Orientation Status: Within Functional Limits     Objective    ADL  UE Dressing: Contact guard assistance;Setup(to manage gown )  LE Dressing: Stand by assistance;Setup(to don sock supine in bed, assist for shoe seated EOB )  Additional Comments: Pt supine in bed on arrival. Pt completed bed mobility and sat at EOB. Pt completed sit > stand transfer and functional mobility to chair with use of RW. Pt required VCs for hand placement and safety awareness throughout session, cues to remain at EOB and allow writer to manage lines prior to activity. Pt declined further ADLs d/t RN assisting pt earlier this date (RN confirmed). Pt remained in chair, call light in reach and RN notified on therapist exit.  Chair alarm armed      Balance  Sitting Balance: Stand by assistance(seated EOB )  Standing Balance: Contact guard assistance(use of RW )  Functional Mobility  Functional - Mobility Device: Rolling Walker  Activity: Other  Assist Level: Contact guard assistance  Functional Mobility Comments: VCs for hand placement on RW during session. Cues to allow writer to manage lines   Bed mobility  Rolling to Right: Stand by assistance  Supine to Sit: Stand by assistance  Sit to Supine: Unable to assess(Pt left in chair on exit )  Scooting: Stand by assistance  Comment: HOB raised   Transfers  Stand Step Transfers: Contact guard assistance  Sit to stand: Contact guard assistance  Stand to sit: Contact guard assistance  Transfer Comments: VCs for hand placement on RW     Cognition  Overall Cognitive Status: Exceptions  Safety Judgement: Decreased awareness of need for assistance;Decreased awareness of need for safety  Insights: Decreased awareness of deficits  Cognition Comment: Pt impulsive throughout session, cues for safety awareness         Perception  Overall Perceptual Status: [facility]     Plan   Plan  Times per week: 3-4x/wk  Current Treatment Recommendations: Safety Education & Training, Patient/Caregiver Education & Training, Functional Mobility Training, Endurance Training, Self-Care / ADL    Goals  Short term goals  Time Frame for Short term goals: By discharge, pt will  Short term goal 1: Mod I/I complete UE ADL activities  Short term goal 2: Mod I/I complete LE ADL activities using AD PRN  Short term goal 3: IND complete functional mobility/transfer with use of LRD PRN in order to complete ADL/functional activities  Short term goal 4: Demo understanding and I use of EC/WS, fall prevention, desensitization tech during functional activities to increase participation in ADL/ functional activities        Therapy Time   Individual Concurrent Group Co-treatment   Time In 1422         Time Out 1440         Minutes 18          See above for LOF. RN reports patient is medically stable for therapy treatment this date. Chart reviewed prior to treatment and patient is agreeable for therapy. All lines intact and patient positioned comfortably at end of treatment.   All

## 2019-08-28 NOTE — PROGRESS NOTES
Division of Vascular Surgery             Progress Note      Name: Lynne Veliz  MRN: [id number]         Overnight Events:     No overnight events per nurse and patient. Subjective:     Patient is doing well today. Patient denies N/V/F/C. Pt reports pain well controlled. Had 2x BM yesterday. Tolerating diet. Physical Exam:     Vitals:  BP (!) 109/47   Pulse 73   Temp 98.3 °F (36.8 °C) (Oral)   Resp 15   Ht 5' 5\" (1.651 m)   Wt 135 lb 3.2 oz (61.3 kg)   SpO2 96%   BMI 22.50 kg/m²     General appearance - alert, well appearing and in no acute distress  Mental status - alert and oriented with normal affect  Head - normocephalic and atraumatic, no scleral icterus  Neck - supple, trachea midline  Chest - no respiratory distress, no audible wheezing  Heart - +S1/S2  Abdomen - soft, non-tender, non-distended  Neurological - normal speech, no focal findings or movement disorder noted  Skin - no gross lesions, rashes, or induration noted  Extremities - RLE guillotine amp noted with dressing C/D/I     Imaging:   XR Right Foot (08/20/2019): Impression   Cortical irregularity and osseous lucency in the posterior plantar calcaneus   is highly suspicious for acute osteomyelitis.  These findings are new from   the prior study.       Overlying soft tissue swelling and irregularity may relate to cellulitis with   soft tissue ulcer. MRI Right ankle without contrast (08/22/2019): Impression   Osteomyelitis of the calcaneal tuberosity and likely within the talus.       Edema within the midfoot bones equivocal for osteomyelitis versus reactive   edema related to Charcot arthropathy.       Mild tenosynovitis of the peroneal tendons without significant fluid at the   level of the malleoli.       Partial tear of the flexor digitorum tendon with mild tenosynovitis.      Labs (08/26/19 0600):   Cr 0.46  Glucose 204 (08/25 244)  Hb 11.8  Hct 37.0    Assessment:     1. 50yo male with PMHx of PAD, non-healing R foot ulcer

## 2019-08-28 NOTE — OP NOTE
none  was given. Time-out was performed and agreed upon. I began by marking  out the proposed incision with anteriorly being roughly 12 cm past the  tibial tuberosity and then creating the posterior flap. Esmarch was  used to drain the leg, and the tourniquet was  turned up to 250 mmHg. Incision was then created along the drawn out  surgical lines, taking it down to the fascia. Electrocautery was then  used to transect the medial muscular compartment. The neurovascular bundle was  identified and was clamped and transected. It was then ligated with  suture ligature x2. The anterolateral compartment was then transected  with electrocautery. The fibula was identified, and then the periosteum  was elevated with electrocautery and periosteal elevator. Periosteum of  the tibial was also elevated using a combination of lap pad and  periosteal elevator. The tibia was then transected with a power saw,  roughly 3 cm above the skin incision. The fibula was then transected as  well with the power saw, roughly 2 cm above the transected tibia. Amputation knife was then used to create the posterior flap, and the  specimen was then passed off. Neurovascular bundles of the anterior  tibia and peroneal were identified and suture ligated. Tourniquet was turned off. There was some venous bleeding that was  controlled with electrocautery. Overall, it was hemostatic. The  anterior surface of the tibia was then dulled out with a rasp. The  entire wound bed was then irrigated. It was overall hemostatic. Myodesis stitch was then performed covering up the cut edge of the tibia  with 0 Vicryl. Fascia was then reapproximated with interrupted 2-0  Vicryl, and the skin was then reapproximated with staples. Mepilex was  then applied, followed by fluff, Kerlix, Ace wrap, and Coban. The  posterior flap skin did have some bruising from the previous compression  wrap, so at this time the wraps were not placed very tightly.

## 2019-08-28 NOTE — CONSULTS
Transfers: Contact guard assistance  Sit to stand: Contact guard assistance  Stand to sit: Contact guard assistance  Transfer Comments: VCs for hand placement on RW                    Past Medical History:        Diagnosis Date    CAD (coronary artery disease) 04/07/2019    [facility] 04/07/2019    Diabetes mellitus ([facility] 04/2019    ON RX    Hyperlipidemia 04/2019    ON RX HAS NOT HAD SINCE 06/30/2019    Hypertension 04/2019    ON RX NONE SINCE 06/2019    Osteolysis 04/07/2019    R Knee hardware    PEA (Pulseless electrical activity) [facility] 04/07/2019    PVD (peripheral vascular disease) ([facility] 04/07/2019       Past Surgical History:        Procedure Laterality Date    CARDIAC CATHETERIZATION  04/07/2019    MVD-Stv    CAROTID STENT PLACEMENT  07/03/2019    ACCULINK CAROTID STENT PLACED, MRI CONDITIONAL 5, 3T OK.      CORONARY ARTERY BYPASS GRAFT  04/07/2019    x4 per Dr. Pablo Alvarado, 0 [facility] GRAFT N/A 4/7/2019    EMERGENT CPR,  CORONARY ARTERY BYPASS X4, ON PUMP ,CHEST LEFT OPEN; SWAN, ANGELA PER ANESTHESIA performed by Jennifer Smith MD at [address] [facility] Right 2000    FEMUR SURGERY      unsure if correct known tibial surgery    HARDWARE REMOVAL Right 04/07/2019    R Knee internal screw protruding through skin    HARDWARE REMOVAL Right 04/22/2019    HARDWARE REMOVAL RIGHT TIBIA,    LEG AMPUTATION BELOW KNEE Right 8/25/2019    RIGHT FOOT GUILLOTINE AMPUTATION performed by Luana Mata MD at [address] [facility] Right 8/27/2019    RIGHT BELOW KNEE AMPUTATION FORMALIZATION performed by Luana Mata MD at [address] [facility] N/A 4/8/2019    STERNAL  WOUND WASHOUT AND CLOSURE S/P CABG performed by Jennifer Smith MD at [address] [facility] N/A 4/22/2019    HARDWARE REMOVAL RIGHT TIBIA, IRRIGATION AND DEBRIDEMENT [facility] performed by Juan Almanzar None    Transportation needs:     Medical: None     Non-medical: None   Tobacco Use    Smoking status: Former Smoker     Packs/day: 1.00     Years: 20.00     Pack years: 20.00     Last attempt to quit: 2019     Years since quittin.3    Smokeless tobacco: Never Used   Substance and Sexual Activity    Alcohol use: No     Comment: RECOVERING X 20 YEARS    Drug use: No    Sexual activity: None   Lifestyle    Physical activity:     Days per week: None     Minutes per session: None    Stress: None   Relationships    Social connections:     Talks on phone: None     Gets together: None     Attends Tenri[employer] service: None     Active member of club or organization: None     Attends meetings of clubs or organizations: None     Relationship status: None    Intimate partner violence:     Fear of current or ex partner: None     Emotionally abused: None     Physically abused: None     Forced sexual activity: None   Other Topics Concern    None   Social History Narrative    ** Merged History Encounter **            Family History:       Problem Relation Age of Onset    Heart Disease Mother         CAD WITH STENT    Diabetes Mother     Stroke Mother     Diabetes Father     Stroke Father     Heart Disease Father         CAD    High Blood Pressure Father     High Blood Pressure Sister     Diabetes Brother     Heart Disease Brother     High Blood Pressure Brother        Radiology:  MRI OF THE RIGHT ANKLE WITHOUT CONTRAST, 2019 1:43 am       TECHNIQUE:   Multiplanar multisequence MRI of the right ankle was performed without the   administration of intravenous contrast.       COMPARISON:   2019       HISTORY:   ORDERING SYSTEM PROVIDED HISTORY: Osteo R heel       Skin ulceration.  Peripheral artery disease with foot wound.       FINDINGS:   SYNDESMOTIC LIGAMENTS:  The anterior-inferior tibiofibular ligament,   interosseous membrane and posterior-inferior tibiofibular ligaments are   normal. Warm dry and intact with good turgor except R residual limb incision. NEURO: A&O x 3. Sensation intact to light touch. DTRs 2+  Motor: Functional ROM. Muscle tone and bulk are normal bilaterally. Strength 5/5 key muscles BUEs and LLE. Strength 4+/5 R hip flexion and knee extension. PSYCH: Mood WNL. Affect WNL. Appropriately interactive. Impression:  1. R BKA: for calcaneal osteomyelitis and PAD. Completing antibiotics today. Vascular consulting prosthetist. On ASA. Has tylenol, dilaudid for pain. 2. PAD/CAD: on ASA, plavix. 3. DM II with neuropathy: on Lantus and sliding scale, gabapentin as ordered  4. HTN/CHF: holding Lasix for now. On lisinopril, lopressor    Recommendations:    1. Diagnosis:  R BKA  2. Therapy: Has PT/OT needs  3. Medical Necessity: As above  4. Support: Limited and has no home disposition  5. Rehab Recommendation: If patient has home disposition to a family member's home he will benefit from inpatient acute rehabilitation for residual limb shaping, phantom limb pain management, and pre-prosthetic gait training. If patient needs more time to find a home disposition would recommend SNF level rehab with outpatient PM&R follow up for long-term amputation follow-up. 6. DVT Prophylaxis: On Lovenox    It was my pleasure to evaluate Augustina Geiger today. Please call with questions. Erika Conde MD        This note is created with the assistance of a speech recognition program.  While intending to generate a document that actually reflects the content of the visit, the document can still have some errors including those of syntax and sound a like substitutions which may escape proof reading.   In such instances, actual meaning can be extrapolated by contextual diversion.

## 2019-08-29 VITALS
BODY MASS INDEX: 22.39 KG/M2 | OXYGEN SATURATION: 98 % | SYSTOLIC BLOOD PRESSURE: 135 MMHG | TEMPERATURE: 98 F | WEIGHT: 134.4 LBS | HEART RATE: 83 BPM | DIASTOLIC BLOOD PRESSURE: 63 MMHG | RESPIRATION RATE: 16 BRPM | HEIGHT: 65 IN

## 2019-08-29 LAB
GLUCOSE BLD-MCNC: 134 MG/DL (ref 75–110)
GLUCOSE BLD-MCNC: 206 MG/DL (ref 75–110)
GLUCOSE BLD-MCNC: 260 MG/DL (ref 75–110)

## 2019-08-29 PROCEDURE — 6370000000 HC RX 637 (ALT 250 FOR IP): Performed by: STUDENT IN AN ORGANIZED HEALTH CARE EDUCATION/TRAINING PROGRAM

## 2019-08-29 PROCEDURE — 99232 SBSQ HOSP IP/OBS MODERATE 35: CPT | Performed by: INTERNAL MEDICINE

## 2019-08-29 PROCEDURE — 6370000000 HC RX 637 (ALT 250 FOR IP): Performed by: SURGERY

## 2019-08-29 PROCEDURE — 6360000002 HC RX W HCPCS: Performed by: STUDENT IN AN ORGANIZED HEALTH CARE EDUCATION/TRAINING PROGRAM

## 2019-08-29 PROCEDURE — 97110 THERAPEUTIC EXERCISES: CPT

## 2019-08-29 PROCEDURE — 99024 POSTOP FOLLOW-UP VISIT: CPT | Performed by: SURGERY

## 2019-08-29 PROCEDURE — 82947 ASSAY GLUCOSE BLOOD QUANT: CPT

## 2019-08-29 PROCEDURE — 97116 GAIT TRAINING THERAPY: CPT

## 2019-08-29 RX ORDER — OXYCODONE HYDROCHLORIDE AND ACETAMINOPHEN 5; 325 MG/1; MG/1
2 TABLET ORAL EVERY 4 HOURS PRN
Status: DISCONTINUED | OUTPATIENT
Start: 2019-08-29 | End: 2019-08-29

## 2019-08-29 RX ORDER — GABAPENTIN 300 MG/1
300 CAPSULE ORAL EVERY 8 HOURS
Qty: 90 CAPSULE | Refills: 0 | Status: SHIPPED | OUTPATIENT
Start: 2019-08-29 | End: 2020-08-24 | Stop reason: SDUPTHER

## 2019-08-29 RX ORDER — OXYCODONE HYDROCHLORIDE AND ACETAMINOPHEN 5; 325 MG/1; MG/1
1 TABLET ORAL EVERY 4 HOURS PRN
Status: DISCONTINUED | OUTPATIENT
Start: 2019-08-29 | End: 2019-08-29 | Stop reason: HOSPADM

## 2019-08-29 RX ORDER — OXYCODONE HYDROCHLORIDE AND ACETAMINOPHEN 5; 325 MG/1; MG/1
1 TABLET ORAL EVERY 6 HOURS PRN
Qty: 28 TABLET | Refills: 0 | Status: SHIPPED | OUTPATIENT
Start: 2019-08-29 | End: 2019-09-05

## 2019-08-29 RX ORDER — OXYCODONE HYDROCHLORIDE AND ACETAMINOPHEN 5; 325 MG/1; MG/1
2 TABLET ORAL EVERY 4 HOURS PRN
Status: DISCONTINUED | OUTPATIENT
Start: 2019-08-29 | End: 2019-08-29 | Stop reason: HOSPADM

## 2019-08-29 RX ADMIN — FUROSEMIDE 20 MG: 20 TABLET ORAL at 07:50

## 2019-08-29 RX ADMIN — INSULIN LISPRO 9 UNITS: 100 INJECTION, SOLUTION INTRAVENOUS; SUBCUTANEOUS at 09:04

## 2019-08-29 RX ADMIN — INSULIN GLARGINE 20 UNITS: 100 INJECTION, SOLUTION SUBCUTANEOUS at 09:05

## 2019-08-29 RX ADMIN — GABAPENTIN 300 MG: 300 CAPSULE ORAL at 15:56

## 2019-08-29 RX ADMIN — OXYCODONE HYDROCHLORIDE AND ACETAMINOPHEN 1 TABLET: 5; 325 TABLET ORAL at 18:32

## 2019-08-29 RX ADMIN — OXYCODONE HYDROCHLORIDE AND ACETAMINOPHEN 1 TABLET: 5; 325 TABLET ORAL at 09:08

## 2019-08-29 RX ADMIN — ENOXAPARIN SODIUM 40 MG: 40 INJECTION SUBCUTANEOUS at 09:07

## 2019-08-29 RX ADMIN — LISINOPRIL 10 MG: 10 TABLET ORAL at 07:49

## 2019-08-29 RX ADMIN — GABAPENTIN 300 MG: 300 CAPSULE ORAL at 07:50

## 2019-08-29 RX ADMIN — INSULIN LISPRO 6 UNITS: 100 INJECTION, SOLUTION INTRAVENOUS; SUBCUTANEOUS at 12:27

## 2019-08-29 RX ADMIN — METOPROLOL TARTRATE 25 MG: 25 TABLET ORAL at 09:07

## 2019-08-29 RX ADMIN — CLOPIDOGREL 75 MG: 75 TABLET, FILM COATED ORAL at 07:50

## 2019-08-29 RX ADMIN — OXYCODONE HYDROCHLORIDE AND ACETAMINOPHEN 1 TABLET: 5; 325 TABLET ORAL at 13:39

## 2019-08-29 RX ADMIN — FAMOTIDINE 20 MG: 20 TABLET, FILM COATED ORAL at 09:07

## 2019-08-29 RX ADMIN — ACETAMINOPHEN 1000 MG: 500 TABLET ORAL at 15:16

## 2019-08-29 RX ADMIN — ASPIRIN 81 MG: 81 TABLET, CHEWABLE ORAL at 07:50

## 2019-08-29 ASSESSMENT — PAIN SCALES - GENERAL
PAINLEVEL_OUTOF10: 3
PAINLEVEL_OUTOF10: 3
PAINLEVEL_OUTOF10: 5
PAINLEVEL_OUTOF10: 4
PAINLEVEL_OUTOF10: 5

## 2019-08-29 ASSESSMENT — PAIN DESCRIPTION - ORIENTATION: ORIENTATION: RIGHT

## 2019-08-29 ASSESSMENT — PAIN DESCRIPTION - LOCATION: LOCATION: LEG

## 2019-08-29 ASSESSMENT — PAIN DESCRIPTION - PAIN TYPE: TYPE: SURGICAL PAIN

## 2019-08-29 NOTE — PROGRESS NOTES
[facility] - Progress Note  Today's Date and Time: 8/29/2019, 9:15 AM    Impression :   1. Rt calcaneal osteomyelitis  2. Rt calcaneal stage IV decubitus wound  3. Severe PAD  4. CAD s/p CABG x 4 (4/2019)  5. Type 2 DM  6. Peripheral neuropathy  7. CVA  8. S/P second stage Rt BKA 8-27-19    Recommendations:   · Wound care  · Rehab  · Discontinued Zosyn . Medical Decision Making/Summary/Discussion:   · 54 yo gentleman who initially presented in April 2019 with chest pain  · Suffered cardiac arrest x 3, had an emergent CABG x 4 4-7-19  · Was found to have a protruding Rt tibial orthopedic screw from prior ORIF of tibial fracture without overt infection of the leg  · Then developed fevers, started on Vancomycin, developed ALMITA, transitioned to Ceftaroline with improved renal function  · MRI showed occlusion Lt carotid artery and multiple infracts  · Leg hardware was removed 4-22-19, pt transitioned to Doxycycline, with plan for chronic suppressive therapy. · Pt was discharged to SNF and did not follow up in office. · 8-14 presented to podiatry clinic after not being seen for 6 weeks. He was noted to have foul smelling drainage from the wound, and underwent debridement of the wound at the clinic and was referred to vascular for further work up. · 8-16 Was seen by Dr Daniel Chavarria who scheduled him for an angiogram for 8-20  · 8-20 Pt admitted and started on Vancomycin and Zosyn. Angiogram completed and showed severe aortoiliac occlusive disease, bilateral profunda stenosis, right SFA occlusion with reconstitution of above knee popliteal artery with 3 vessel runoff, left CFA stenosis SFA/popliteal patent with 3 vessel runoff. · Plan for open aortobifemoral bypass and fem-pop on the right after plavix is out of pts system has been changed to a two stage BKA. · Vancomycin stopped, continue Zosyn perioperatively. · Patient s/p second stage Rt BKA on 8-27-19.   · Antibiotics Heart Disease Brother     High Blood Pressure Brother         Allergies:   Patient has no known allergies. Review of Systems:   Constitutional: No fevers or chills. No systemic complaints  Head: No headaches  Eyes: No double vision or blurry vision. No conjunctival inflammation. ENT: No sore throat or runny nose. . No hearing loss, tinnitus or vertigo. Cardiovascular: No chest pain or palpitations. No shortness of breath. No PORTILLO  Lung: No shortness of breath or cough. No sputum production  Abdomen: No nausea, vomiting, diarrhea, or abdominal pain. Benna Him No cramps. Genitourinary: No increased urinary frequency, or dysuria. No hematuria. No suprapubic or CVA pain  Musculoskeletal: No muscle aches or pains. No joint effusions, swelling or deformities  Hematologic: No bleeding or bruising. Neurologic: No headache, weakness, numbness, or tingling. Integument: No rash, no ulcers. Psychiatric: No depression. Endocrine: No polyuria, no polydipsia, no polyphagia. Physical Examination :     Patient Vitals for the past 8 hrs:   BP Temp Temp src Resp SpO2 Weight   08/29/19 0900 -- 99.4 °F (37.4 °C) Oral -- -- --   08/29/19 0800 135/63 99.6 °F (37.6 °C) Oral 16 98 % --   08/29/19 0600 -- -- -- -- -- 134 lb 6.4 oz (61 kg)     General Appearance: Awake, alert, and in no apparent distress  Head:  Normocephalic, no trauma  Eyes: Pupils equal, round, reactive to light and accommodation; extraocular movements intact; sclera anicteric; conjunctivae pink. No embolic phenomena. ENT: Oropharynx clear, without erythema, exudate, or thrush. No tenderness of sinuses. Mouth/throat: mucosa pink and moist. No lesions. Dentition in good repair. Neck:Supple, without lymphadenopathy. Thyroid normal, No bruits. Pulmonary/Chest: Clear to auscultation, without wheezes, rales, or rhonchi. No dullness to percussion. No egophony. Cardiovascular: Regular rate and rhythm without murmurs, rubs, or gallops. Abdomen: Soft, non tender.  Bowel

## 2019-08-29 NOTE — DISCHARGE INSTR - COC
internal screw protruding through skin    HARDWARE REMOVAL Right 04/22/2019    HARDWARE REMOVAL RIGHT TIBIA,    LEG AMPUTATION BELOW KNEE Right 8/25/2019    RIGHT FOOT GUILLOTINE AMPUTATION performed by Luana Mata MD at [address] [facility] Right 8/27/2019    RIGHT BELOW KNEE AMPUTATION FORMALIZATION performed by Luana Mata MD at [facility] N/A 4/8/2019    STERNAL  WOUND WASHOUT AND CLOSURE S/P CABG performed by Jennifer Smith MD at [address] N/A 4/22/2019    HARDWARE REMOVAL RIGHT TIBIA, IRRIGATION AND DEBRIDEMENT [facility] performed by Juan Almanzar MD at [address] Right [address]    Thelma Moffett off roof, external fixation and internal hardware    TONSILLECTOMY  1974       Immunization History:   Immunization History   Administered Date(s) Administered    Tdap (Boostrix, Adacel) 11/11/2015       Active Problems:  Patient Active Problem List   Diagnosis Code    PAD (peripheral artery disease) ([facility] I73.9    Subacute osteomyelitis of right tibia ([facility] B11.925    Metabolic encephalopathy X57.77    Abnormal CT of the head R93.0    Hepatitis K75.9    Acute cerebral infarction associated with systemic hypoxia or ischemia ([facility]) I63.89    Multi infarct state I69.30    Carotid stenosis, bilateral I65.23    Right foot drop M21.371    Thrombocytosis ([facility]) D47.3    Acute CVA (cerebrovascular accident) ([facility] I63.9    Moderate malnutrition ([facility] E44.0    Cellulitis of right lower extremity L03.115    Right-sided extracranial carotid artery stenosis I65.21    Essential hypertension I10    Type 2 diabetes mellitus with diabetic neuropathy, without long-term current use of insulin ([facility]) E11.40    Leukocytosis D72.829    Status post angiography of extremity Z98.890    Ischemic gangrene ([facility]) I96    Pyogenic inflammation of bone ([facility]) M86.9    Coronary artery disease I25.10

## 2019-08-29 NOTE — PROGRESS NOTES
Physical Therapy  Facility/Department: [facility] CAR 2  Daily Treatment Note  NAME: Mayco Ames  : 1965  MRN: [id number]    Date of Service: 2019    Discharge Recommendations:  Further therapy recommended at discharge and the patient should be able to tolerate at least 3 hours per day over 5 days or 15 hours over 7 days. Assessment     Body structures, Functions, Activity limitations: Decreased functional mobility ; Decreased balance;Decreased endurance;Decreased ROM; Decreased strength;Decreased sensation; Increased Pain;Decreased safe awareness  Assessment: Pt amb 30ft x2 using RW and MIn A/CGA for safety. Pt grossly steady and verbalizes motivation to ambulate longer distance . Pt would benefit from more PT to address defict. Prognosis: Fair  Decision Making: Medium Complexity  PT Education: Plan of Care;Precautions; Home Exercise Program;Energy Conservation;Transfer Training;Gait Training  REQUIRES PT FOLLOW UP: Yes  Activity Tolerance  Activity Tolerance: Patient Tolerated treatment well; Other  Activity Tolerance: Bleeding at Incision site. Patient Diagnosis(es): The primary encounter diagnosis was Acute post-operative pain. A diagnosis of Status post angiography of extremity was also pertinent to this visit. has a past medical history of CAD (coronary artery disease), Cardiac arrest ([address] Diabetes mellitus ([address] Hyperlipidemia, Hypertension, Osteolysis, PEA (Pulseless electrical activity) ([address] and PVD (peripheral vascular disease) ([address] has a past surgical history that includes Femur fracture surgery (Right, ); Cardiac catheterization (2019); Femur Surgery; Tonsillectomy (); Coronary artery bypass graft (2019); Hardware Removal (Right, 2019); Tibia fracture surgery (Right, ); Coronary artery bypass graft (N/A, 2019); RECONSTRUCTIVE REPAIR STERNAL (N/A, 2019); Hardware Removal (Right, 2019); REMOVE HARDWARE FEMUR (N/A, 2019);  Carotid

## 2019-08-29 NOTE — PROGRESS NOTES
[facility]    Progress Note    8/29/2019    11:28 AM    Name:   Eli Palomino  MRN:     [id number]     [place]:      [de-identified]   Room:   2025/2025-01  IP Day:  9  Admit Date:  8/20/2019  6:34 PM    PCP:   Eleni Andujar MD  Code Status:  Full Code    Subjective:     C/C: Foot pain    Interval History Status: not changed. No acute issues overnight  No current complaints  Pain controlled  Glucose stable  DC planning in progress    Brief History:     53years old gentleman who was admitted to vascular surgery for right foot wound. Patient has history of coronary artery disease, he was admitted in April 2019 with cardiac arrest.  He underwent CABG at that time. Patient then developed a right heat ulcer and has been following with podiatry. He apparently had issues with some complaints. He was referred to vascular surgery. He was admitted on August 20 for an angiogram.  It showed severe aortoiliac disease. He was also found to have osteomyelitis. Podiatry was following him. He was started on vancomycin and Zosyn. Initially the plan was for bypass but then it was decided that even with improving circulation patient's foot is unsalvageable. Vascular is currently discussing with the patient about below-knee amputation. We were consulted for medical management. Patient's sugars have been running high. His hemoglobin A1c on admission was found to be 10. Patient says he did not know about his diabetes until he had a cardiac arrest.  He takes metformin at home and he says normally his sugars run okay.   His hemoglobin A1c on admission was 10.    8/25  Procedure(s):  RIGHT FOOT GUILLOTINE AMPUTATION    8/27  Procedure: Formalization of right BKA    Review of Systems:     Constitutional:  negative for chills, fevers, sweats  Respiratory:  negative for cough, dyspnea on exertion, hemoptysis, shortness of breath, wheezing  Cardiovascular:

## 2019-08-30 ENCOUNTER — HOSPITAL ENCOUNTER (OUTPATIENT)
Age: 54
Setting detail: SPECIMEN
Discharge: HOME | End: 2019-08-30
Payer: MEDICAID

## 2019-08-30 ENCOUNTER — TELEPHONE (OUTPATIENT)
Dept: CARDIOTHORACIC SURGERY | Age: 54
End: 2019-08-30

## 2019-08-30 LAB
ABSOLUTE EOS #: 0.03 K/UL (ref 0–0.44)
ABSOLUTE IMMATURE GRANULOCYTE: 0.2 K/UL (ref 0–0.3)
ABSOLUTE LYMPH #: 2.12 K/UL (ref 1.1–3.7)
ABSOLUTE MONO #: 1.4 K/UL (ref 0.1–1.2)
ANION GAP SERPL CALCULATED.3IONS-SCNC: 15 MMOL/L (ref 9–17)
BASOPHILS # BLD: 0 % (ref 0–2)
BASOPHILS ABSOLUTE: 0.08 K/UL (ref 0–0.2)
BUN BLDV-MCNC: 9 MG/DL (ref 6–20)
BUN/CREAT BLD: 15 (ref 9–20)
CALCIUM SERPL-MCNC: 9.1 MG/DL (ref 8.6–10.4)
CHLORIDE BLD-SCNC: 92 MMOL/L (ref 98–107)
CO2: 23 MMOL/L (ref 20–31)
CREAT SERPL-MCNC: 0.6 MG/DL (ref 0.7–1.2)
DIFFERENTIAL TYPE: ABNORMAL
EOSINOPHILS RELATIVE PERCENT: 0 % (ref 1–4)
GFR AFRICAN AMERICAN: >60 ML/MIN
GFR NON-AFRICAN AMERICAN: >60 ML/MIN
GFR SERPL CREATININE-BSD FRML MDRD: ABNORMAL ML/MIN/{1.73_M2}
GFR SERPL CREATININE-BSD FRML MDRD: ABNORMAL ML/MIN/{1.73_M2}
GLUCOSE BLD-MCNC: 321 MG/DL (ref 70–99)
HCT VFR BLD CALC: 32.1 % (ref 40.7–50.3)
HEMOGLOBIN: 10.1 G/DL (ref 13–17)
IMMATURE GRANULOCYTES: 1 %
LYMPHOCYTES # BLD: 10 % (ref 24–43)
MCH RBC QN AUTO: 28.5 PG (ref 25.2–33.5)
MCHC RBC AUTO-ENTMCNC: 31.5 G/DL (ref 28.4–34.8)
MCV RBC AUTO: 90.7 FL (ref 82.6–102.9)
MONOCYTES # BLD: 6 % (ref 3–12)
NRBC AUTOMATED: 0 PER 100 WBC
PDW BLD-RTO: 13.1 % (ref 11.8–14.4)
PLATELET # BLD: 680 K/UL (ref 138–453)
PLATELET ESTIMATE: ABNORMAL
PMV BLD AUTO: 9.1 FL (ref 8.1–13.5)
POTASSIUM SERPL-SCNC: 4 MMOL/L (ref 3.7–5.3)
RBC # BLD: 3.54 M/UL (ref 4.21–5.77)
RBC # BLD: ABNORMAL 10*6/UL
SEG NEUTROPHILS: 83 % (ref 36–65)
SEGMENTED NEUTROPHILS ABSOLUTE COUNT: 18.26 K/UL (ref 1.5–8.1)
SODIUM BLD-SCNC: 130 MMOL/L (ref 135–144)
WBC # BLD: 22.1 K/UL (ref 3.5–11.3)
WBC # BLD: ABNORMAL 10*3/UL

## 2019-08-30 PROCEDURE — 80048 BASIC METABOLIC PNL TOTAL CA: CPT

## 2019-08-30 PROCEDURE — P9603 ONE-WAY ALLOW PRORATED MILES: HCPCS

## 2019-08-30 PROCEDURE — 85025 COMPLETE CBC W/AUTO DIFF WBC: CPT

## 2019-08-30 PROCEDURE — 36415 COLL VENOUS BLD VENIPUNCTURE: CPT

## 2019-08-30 PROCEDURE — 87070 CULTURE OTHR SPECIMN AEROBIC: CPT

## 2019-08-30 PROCEDURE — 87040 BLOOD CULTURE FOR BACTERIA: CPT

## 2019-08-30 PROCEDURE — 87205 SMEAR GRAM STAIN: CPT

## 2019-08-30 NOTE — PROGRESS NOTES
Dr. Janene Gitelman gave rider instructions on wound care of the right BKA: wound dressing changes are to be done every other day & wash right stump/BKA with hibiclens and apply 1-4x8 mepiplex along the staple line & 1- 4x4 mepiplex on the right corner of the staple line.  Harvey added instructions to Vernon Luna

## 2019-08-30 NOTE — TELEPHONE ENCOUNTER
1. Most recent blood pressure readings. .. 144/75  2. What is the patient's heart rate? 81  3. At what time was the BP reading taken? 10:00 Time interval between readings? Unknown    4. Any possible contributing factors (ie: just woke up, skipped a meal, stress, etc.) WBC elevated 22.1  5. Is there an elevated temperature (are you running a fever?), temperature of 108.8 7:30 pm 8/29/19 nausea/vomiting? no  6. If BP was taken on both arms, are they similar readings? yes  5. Any recent changes to medications, or issues with medication compliance? Was not informed of medication change  6. Is the patient experiencing any dizziness, light-headedness, SOB, chest pain, headache? No   7. What is the patient's pain on a scale of 0-10, with 10 being the most excruciating?   5

## 2019-08-30 NOTE — PROGRESS NOTES
Nida Alejandra from [employer] came and spoke with patient about his prosthetic and told the patient he is available for his resource

## 2019-08-31 LAB
CULTURE: NO GROWTH
DIRECT EXAM: NORMAL
DIRECT EXAM: NORMAL
Lab: NORMAL
SPECIMEN DESCRIPTION: NORMAL

## 2019-09-03 ENCOUNTER — HOSPITAL ENCOUNTER (OUTPATIENT)
Age: 54
Setting detail: SPECIMEN
Discharge: HOME | End: 2019-09-03
Payer: MEDICAID

## 2019-09-03 LAB — WBC # BLD: 10.6 K/UL (ref 3.5–11.3)

## 2019-09-03 PROCEDURE — 36415 COLL VENOUS BLD VENIPUNCTURE: CPT

## 2019-09-03 PROCEDURE — P9603 ONE-WAY ALLOW PRORATED MILES: HCPCS

## 2019-09-03 PROCEDURE — 85048 AUTOMATED LEUKOCYTE COUNT: CPT

## 2019-09-05 LAB
CULTURE: NORMAL
CULTURE: NORMAL
Lab: NORMAL
Lab: NORMAL
SPECIMEN DESCRIPTION: NORMAL
SPECIMEN DESCRIPTION: NORMAL

## 2019-09-09 NOTE — DISCHARGE SUMMARY
morphology and signal. ACHILLES TENDON: Tendinosis of the Achilles tendon. No tendon tear. PLANTAR FASCIA: Thickening of the central cord of the plantar fascia compatible with chronic plantar fasciitis. TARSAL TUNNEL: No mass. BONE MARROW: Intense bone marrow edema of the calcaneal tuberosity with relatively patchy edema and loss of T1 hyperintensity in the remaining portions of the calcaneus. Patchy edema in the talus with loss of T1 hyperintensity most likely due to osteomyelitis. Patchy edema with loss of T1 hyperintensity in the cuneiforms and the cuboid more equivocal for osteomyelitis given their distance from the opened wound. JOINT SPACES: The joint spaces are maintained. No suspicious joint effusions or marginal edema. Soft tissues: Skin ulceration of the heel measuring 2.8 cm in length and 3.9 cm in width reaching the calcaneus. Muscle edema posteriorly in the hindfoot may be reactive or due to infectious myositis. No gross evidence of abscess but lack of IV contrast limits evaluation. Osteomyelitis of the calcaneal tuberosity and likely within the talus. Edema within the midfoot bones equivocal for osteomyelitis versus reactive edema related to Charcot arthropathy. Mild tenosynovitis of the peroneal tendons without significant fluid at the level of the malleoli. Partial tear of the flexor digitorum tendon with mild tenosynovitis. HOSPITAL COURSE     52 y/o male who was admitted on 8/20/19 with a chronic, non-healing, infected right heel wound which had significant calcaneus and talus bone osteomyelitis. Angiogram's were performed demonstrating severe PVD with aortoiliac occlusive disease and SFA/popliteal disease. He was recommended to undergo revascularization including aorotbifemoral bypass and fem-pop bypass on the right, but after MRI was performed, it was discovered he had significant OM making his foot non-salvageable even with revascularization.  At this point staged amputation was

## 2019-09-10 ENCOUNTER — HOSPITAL ENCOUNTER (EMERGENCY)
Age: 54
Discharge: HOME | End: 2019-09-10
Attending: EMERGENCY MEDICINE
Payer: MEDICAID

## 2019-09-10 ENCOUNTER — APPOINTMENT (OUTPATIENT)
Dept: GENERAL RADIOLOGY | Age: 54
End: 2019-09-10
Payer: MEDICAID

## 2019-09-10 VITALS
DIASTOLIC BLOOD PRESSURE: 71 MMHG | BODY MASS INDEX: 22.82 KG/M2 | HEIGHT: 65 IN | HEART RATE: 97 BPM | SYSTOLIC BLOOD PRESSURE: 137 MMHG | OXYGEN SATURATION: 97 % | WEIGHT: 137 LBS | TEMPERATURE: 97.2 F | RESPIRATION RATE: 16 BRPM

## 2019-09-10 DIAGNOSIS — M79.609 STUMP PAIN (HCC): Primary | ICD-10-CM

## 2019-09-10 DIAGNOSIS — T87.89 STUMP PAIN (HCC): Primary | ICD-10-CM

## 2019-09-10 PROCEDURE — 73590 X-RAY EXAM OF LOWER LEG: CPT

## 2019-09-10 PROCEDURE — 99283 EMERGENCY DEPT VISIT LOW MDM: CPT

## 2019-09-10 ASSESSMENT — PAIN DESCRIPTION - PAIN TYPE: TYPE: ACUTE PAIN

## 2019-09-10 ASSESSMENT — PAIN DESCRIPTION - ORIENTATION: ORIENTATION: RIGHT

## 2019-09-10 ASSESSMENT — ENCOUNTER SYMPTOMS
VOMITING: 0
BACK PAIN: 0
ABDOMINAL PAIN: 0
SHORTNESS OF BREATH: 0
CHEST TIGHTNESS: 0
SINUS PRESSURE: 0
NAUSEA: 0
SORE THROAT: 0
ABDOMINAL DISTENTION: 0
COLOR CHANGE: 0
WHEEZING: 0
COUGH: 0
DIARRHEA: 0
SINUS PAIN: 0

## 2019-09-10 ASSESSMENT — PAIN SCALES - GENERAL: PAINLEVEL_OUTOF10: 7

## 2019-09-10 ASSESSMENT — PAIN DESCRIPTION - FREQUENCY: FREQUENCY: CONTINUOUS

## 2019-09-10 ASSESSMENT — PAIN DESCRIPTION - DESCRIPTORS: DESCRIPTORS: THROBBING

## 2019-09-10 ASSESSMENT — PAIN DESCRIPTION - LOCATION: LOCATION: LEG

## 2019-09-10 NOTE — ED PROVIDER NOTES
[address] [facility]     Emergency Department     Faculty Attestation    I performed a history and physical examination of the patient and discussed management with the resident. I reviewed the resident´s note and agree with the documented findings and plan of care. Any areas of disagreement are noted on the chart. I was personally present for the key portions of any procedures. I have documented in the chart those procedures where I was not present during the key portions. I have reviewed the emergency nurses triage note. I agree with the chief complaint, past medical history, past surgical history, allergies, medications, social and family history as documented unless otherwise noted below. For Physician Assistant/ Nurse Practitioner cases/documentation I have personally evaluated this patient and have completed at least one if not all key elements of the E/M (history, physical exam, and MDM). Additional findings are as noted. Left below the knee amputation 2 weeks ago, patient fell on the stump. Staples are intact, mild bruising around the staple line.      Jessica Ziegler MD  09/10/19 [id number]

## 2019-09-11 NOTE — ED NOTES
Kerlix dressing placed on stump prior to arrival     [facility] [address] [place] [address]  09/10/19 204

## 2019-09-11 NOTE — ED PROVIDER NOTES
Father         CAD    High Blood Pressure Father     High Blood Pressure Sister     Diabetes Brother     Heart Disease Brother     High Blood Pressure Brother        Allergies:    Patient has no known allergies. Home Medications:  Prior to Admission medications    Medication Sig Start Date End Date Taking? Authorizing Provider   gabapentin (NEURONTIN) 300 MG capsule Take 1 capsule by mouth every 8 hours for 30 days. 8/29/19 9/28/19  Leo Darnell DO   lisinopril (PRINIVIL;ZESTRIL) 5 MG tablet Take 2 tablets by mouth daily 7/18/19 8/20/19  Abilio Montes De Oca MD   metoprolol tartrate (LOPRESSOR) 25 MG tablet Take 1 tablet by mouth 2 times daily 7/11/19   Renée León MD   atorvastatin (LIPITOR) 40 MG tablet Take 1 tablet by mouth nightly 7/8/19   Renée León MD   clopidogrel (PLAVIX) 75 MG tablet Take 1 tablet by mouth daily 7/8/19   Renée León MD   furosemide (LASIX) 20 MG tablet Take 1 tablet by mouth daily 7/4/19   ISAAC Khoury - CNP   metFORMIN (GLUCOPHAGE) 500 MG tablet Take 500 mg by mouth daily     Historical Provider, MD   traZODone (DESYREL) 50 MG tablet Take 1 tablet by mouth nightly 6/19/19   Renée León MD   aspirin 81 MG chewable tablet Take 1 tablet by mouth daily 5/10/19   Ramonita Quintana MD   Multiple Vitamins-Minerals (THERAPEUTIC MULTIVITAMIN-MINERALS) tablet Take 1 tablet by mouth daily (with breakfast) 5/10/19   Ramonita Quintana MD   famotidine (PEPCID) 20 MG tablet Take 1 tablet by mouth 2 times daily 5/9/19   Ramonita Quintana MD       REVIEW OF SYSTEMS    (2-9 systems for level 4, 10 or more for level 5)    Review of Systems   Constitutional: Negative for chills, fatigue and fever. HENT: Negative for congestion, sinus pressure, sinus pain and sore throat. Respiratory: Negative for cough, chest tightness, shortness of breath and wheezing. Cardiovascular: Negative for chest pain and palpitations.    Gastrointestinal: Negative for abdominal distention, abdominal pain, diarrhea, nausea and vomiting. Genitourinary: Negative for dysuria, frequency and urgency. Musculoskeletal: Negative for back pain. Skin: Negative for color change and pallor. Neurological: Negative for dizziness, syncope, weakness, light-headedness and headaches. Psychiatric/Behavioral: Negative for confusion. The patient is not nervous/anxious. All other systems reviewed and are negative. PHYSICAL EXAM   (up to 7 for level 4, 8 or more for level 5)    INITIAL VITALS:   ED Triage Vitals [09/10/19 1855]   BP Temp Temp Source Pulse Resp SpO2 Height Weight   137/71 97.2 °F (36.2 °C) Oral 97 16 97 % 5' 5\" (1.651 m) 137 lb (62.1 kg)       Physical Exam   Constitutional: He is oriented to person, place, and time. He appears well-developed and well-nourished. He does not appear ill. No distress. HENT:   Head: Normocephalic. Cardiovascular: Normal rate, regular rhythm, normal heart sounds and intact distal pulses. No murmur heard. Pulses:       Carotid pulses are 2+ on the right side, and 2+ on the left side. Radial pulses are 2+ on the right side, and 2+ on the left side. Pulmonary/Chest: Effort normal and breath sounds normal. No respiratory distress. He has no decreased breath sounds. Abdominal: Soft. Bowel sounds are normal. He exhibits no distension. There is no tenderness. Musculoskeletal: He exhibits tenderness. Right below-the-knee amputation. Staples intact to the incision site. There is a less than 1 cm area with a small amount of venous oozing. All staples are intact. Small area of bruising on the medial aspect of the incision site and on the medial aspect of the incision site. No areas appear strangulated, there are no new incisions or lacerations or abrasions. Neurological: He is alert and oriented to person, place, and time. Nursing note and vitals reviewed.       DIFFERENTIAL  DIAGNOSIS   PLAN (LABS / IMAGING / EKG):  Orders Placed This Encounter Procedures    XR TIBIA FIBULA RIGHT (2 VIEWS)       MEDICATIONS ORDERED:  No orders of the defined types were placed in this encounter. DDX:   Fracture, dislocation, sprain, strain, contusion, muscle spasm, ligamentous injury, meniscus injury,       DIAGNOSTIC RESULTS / EMERGENCYDEPARTMENT COURSE / MDM   LABS:  Labs Reviewed - No data to display    RADIOLOGY:  Xr Tibia Fibula Right (2 Views)    Result Date: 9/10/2019  EXAMINATION: 2 XRAY VIEWS OF THE RIGHT TIBIA AND FIBULA 9/10/2019 7:29 pm COMPARISON: None. HISTORY: ORDERING SYSTEM PROVIDED HISTORY: fall on stump TECHNOLOGIST PROVIDED HISTORY: fall on stump Reason for Exam: Pt fell today onto Rt stump. open laceration wound, pain Acuity: Unknown Type of Exam: Unknown FINDINGS: The patient is status post a below the knee amputation. No fracture or dislocation is identified. No acute osseous injury detected. EMERGENCY DEPARTMENT COURSE:   Patient presents with right stump after falling on it tonight. Will obtain x-rays, will washout wound, and will call orthopedics. X-rays were negative for any acute fracture. Wound was washed out with saline and Betadine mixture. Spoke with orthopedics who stated that patient should keep his appointment with Dr. Jodi Tsang in 2 weeks. Said he should call the office tomorrow to see if he possibly could to be seen in the next 10 days instead of 2 weeks. Patient stated he was fine with this plan. Wound was wrapped in gauze and tape in place. Patient safe for discharge. MDM  Number of Diagnoses or Management Options  Stump pain [facility]): new, needed workup  Diagnosis management comments: Patient presented with pain after falling out of his stump. X-rays were negative for fracture. Wound was washed out and dressed. Small area of bleeding but nothing significant. Spoke with orthopedics who stated that patient should keep his outpatient appointment.        Amount and/or Complexity of Data Reviewed  Tests in the

## 2019-09-12 ENCOUNTER — TELEPHONE (OUTPATIENT)
Dept: FAMILY MEDICINE CLINIC | Age: 54
End: 2019-09-12

## 2019-09-16 ENCOUNTER — TELEPHONE (OUTPATIENT)
Dept: FAMILY MEDICINE CLINIC | Age: 54
End: 2019-09-16

## 2019-09-16 DIAGNOSIS — I63.89: ICD-10-CM

## 2019-09-16 DIAGNOSIS — E11.40 TYPE 2 DIABETES MELLITUS WITH DIABETIC NEUROPATHY, WITHOUT LONG-TERM CURRENT USE OF INSULIN (HCC): Primary | ICD-10-CM

## 2019-09-20 ENCOUNTER — TELEPHONE (OUTPATIENT)
Dept: VASCULAR SURGERY | Age: 54
End: 2019-09-20

## 2019-09-20 DIAGNOSIS — G89.18 POSTOPERATIVE PAIN OF EXTREMITY: Primary | ICD-10-CM

## 2019-09-20 DIAGNOSIS — Z89.511 S/P BKA (BELOW KNEE AMPUTATION) UNILATERAL, RIGHT (HCC): ICD-10-CM

## 2019-09-20 DIAGNOSIS — I10 ESSENTIAL HYPERTENSION: ICD-10-CM

## 2019-09-20 DIAGNOSIS — E11.40 TYPE 2 DIABETES MELLITUS WITH DIABETIC NEUROPATHY, WITHOUT LONG-TERM CURRENT USE OF INSULIN (HCC): Primary | ICD-10-CM

## 2019-09-20 DIAGNOSIS — M79.609 POSTOPERATIVE PAIN OF EXTREMITY: Primary | ICD-10-CM

## 2019-09-20 RX ORDER — LANCETS 30 GAUGE
1 EACH MISCELLANEOUS 2 TIMES DAILY
Qty: 100 EACH | Refills: 3 | Status: CANCELLED | OUTPATIENT
Start: 2019-09-20

## 2019-09-20 RX ORDER — GLUCOSAMINE HCL/CHONDROITIN SU 500-400 MG
1 CAPSULE ORAL 2 TIMES DAILY
Qty: 100 EACH | Refills: 3 | Status: CANCELLED | OUTPATIENT
Start: 2019-09-20

## 2019-09-20 RX ORDER — OXYCODONE HYDROCHLORIDE AND ACETAMINOPHEN 5; 325 MG/1; MG/1
1 TABLET ORAL EVERY 6 HOURS PRN
Qty: 28 TABLET | Refills: 0 | Status: SHIPPED | OUTPATIENT
Start: 2019-09-20 | End: 2019-09-27

## 2019-09-20 RX ORDER — GLUCOSAMINE HCL/CHONDROITIN SU 500-400 MG
CAPSULE ORAL
Qty: 100 STRIP | Refills: 5 | Status: SHIPPED | OUTPATIENT
Start: 2019-09-20 | End: 2019-11-08 | Stop reason: SDUPTHER

## 2019-09-20 RX ORDER — LANCETS 30 GAUGE
1 EACH MISCELLANEOUS DAILY
Qty: 100 EACH | Refills: 5 | Status: SHIPPED | OUTPATIENT
Start: 2019-09-20 | End: 2021-05-28

## 2019-09-20 RX ORDER — GLUCOSAMINE HCL/CHONDROITIN SU 500-400 MG
CAPSULE ORAL
Qty: 100 STRIP | Refills: 3 | Status: CANCELLED | OUTPATIENT
Start: 2019-09-20

## 2019-09-20 RX ORDER — BLOOD PRESSURE TEST KIT
1 KIT MISCELLANEOUS DAILY
Qty: 1 KIT | Refills: 0 | Status: CANCELLED | OUTPATIENT
Start: 2019-09-20

## 2019-09-24 ENCOUNTER — TELEPHONE (OUTPATIENT)
Dept: FAMILY MEDICINE CLINIC | Age: 54
End: 2019-09-24

## 2019-09-25 ENCOUNTER — TELEPHONE (OUTPATIENT)
Dept: VASCULAR SURGERY | Age: 54
End: 2019-09-25

## 2019-09-27 ENCOUNTER — OFFICE VISIT (OUTPATIENT)
Dept: VASCULAR SURGERY | Age: 54
End: 2019-09-27

## 2019-09-27 ENCOUNTER — OFFICE VISIT (OUTPATIENT)
Dept: FAMILY MEDICINE CLINIC | Age: 54
DRG: 305 | End: 2019-09-27
Payer: MEDICAID

## 2019-09-27 VITALS
SYSTOLIC BLOOD PRESSURE: 122 MMHG | HEART RATE: 91 BPM | HEIGHT: 65 IN | DIASTOLIC BLOOD PRESSURE: 70 MMHG | BODY MASS INDEX: 22.82 KG/M2 | WEIGHT: 137 LBS | TEMPERATURE: 97.1 F

## 2019-09-27 VITALS
OXYGEN SATURATION: 99 % | BODY MASS INDEX: 22.99 KG/M2 | DIASTOLIC BLOOD PRESSURE: 82 MMHG | WEIGHT: 138 LBS | HEIGHT: 65 IN | HEART RATE: 98 BPM | SYSTOLIC BLOOD PRESSURE: 140 MMHG

## 2019-09-27 DIAGNOSIS — Z23 NEED FOR PROPHYLACTIC VACCINATION AND INOCULATION AGAINST VARICELLA: ICD-10-CM

## 2019-09-27 DIAGNOSIS — Z12.11 COLON CANCER SCREENING: ICD-10-CM

## 2019-09-27 DIAGNOSIS — Z89.511 S/P BKA (BELOW KNEE AMPUTATION) UNILATERAL, RIGHT (HCC): Primary | ICD-10-CM

## 2019-09-27 DIAGNOSIS — E11.40 TYPE 2 DIABETES MELLITUS WITH DIABETIC NEUROPATHY, WITHOUT LONG-TERM CURRENT USE OF INSULIN (HCC): Primary | ICD-10-CM

## 2019-09-27 DIAGNOSIS — Z23 ENCOUNTER FOR VACCINATION: ICD-10-CM

## 2019-09-27 DIAGNOSIS — Z89.519: ICD-10-CM

## 2019-09-27 DIAGNOSIS — I10 ESSENTIAL HYPERTENSION: ICD-10-CM

## 2019-09-27 PROCEDURE — 99024 POSTOP FOLLOW-UP VISIT: CPT | Performed by: SURGERY

## 2019-09-27 PROCEDURE — 90732 PPSV23 VACC 2 YRS+ SUBQ/IM: CPT | Performed by: STUDENT IN AN ORGANIZED HEALTH CARE EDUCATION/TRAINING PROGRAM

## 2019-09-27 PROCEDURE — 90686 IIV4 VACC NO PRSV 0.5 ML IM: CPT | Performed by: STUDENT IN AN ORGANIZED HEALTH CARE EDUCATION/TRAINING PROGRAM

## 2019-09-27 PROCEDURE — 99213 OFFICE O/P EST LOW 20 MIN: CPT | Performed by: STUDENT IN AN ORGANIZED HEALTH CARE EDUCATION/TRAINING PROGRAM

## 2019-09-27 RX ORDER — INSULIN GLARGINE 100 [IU]/ML
INJECTION, SOLUTION SUBCUTANEOUS
Refills: 0 | COMMUNITY
Start: 2019-09-11 | End: 2019-09-27 | Stop reason: SDUPTHER

## 2019-09-27 RX ORDER — GLUCOSAMINE HCL/CHONDROITIN SU 500-400 MG
CAPSULE ORAL
Qty: 100 STRIP | Refills: 3 | Status: SHIPPED | OUTPATIENT
Start: 2019-09-27 | End: 2019-11-08 | Stop reason: SDUPTHER

## 2019-09-27 RX ORDER — INSULIN GLARGINE 100 [IU]/ML
INJECTION, SOLUTION SUBCUTANEOUS
Qty: 1 VIAL | Status: CANCELLED | OUTPATIENT
Start: 2019-09-27

## 2019-09-27 RX ORDER — MEDICAL SUPPLY, MISCELLANEOUS
1 EACH MISCELLANEOUS ONCE
Qty: 1 EACH | Refills: 0 | Status: SHIPPED | OUTPATIENT
Start: 2019-09-27 | End: 2021-11-09

## 2019-09-27 RX ORDER — INSULIN GLARGINE 100 [IU]/ML
INJECTION, SOLUTION SUBCUTANEOUS
Qty: 5 PEN | Refills: 0 | Status: SHIPPED | OUTPATIENT
Start: 2019-09-27 | End: 2019-12-02 | Stop reason: SDUPTHER

## 2019-09-27 RX ORDER — INSULIN GLARGINE 100 [IU]/ML
INJECTION, SOLUTION SUBCUTANEOUS
COMMUNITY
Start: 2019-08-30 | End: 2019-09-27 | Stop reason: SDUPTHER

## 2019-09-27 ASSESSMENT — ENCOUNTER SYMPTOMS
SORE THROAT: 0
COLOR CHANGE: 1
CHEST TIGHTNESS: 0
NAUSEA: 0
EYE PAIN: 0
SHORTNESS OF BREATH: 0
VOMITING: 0
SHORTNESS OF BREATH: 0
PHOTOPHOBIA: 0
COUGH: 0
RHINORRHEA: 0
WHEEZING: 0
ABDOMINAL PAIN: 0

## 2019-09-28 ENCOUNTER — HOSPITAL ENCOUNTER (INPATIENT)
Age: 54
LOS: 3 days | Discharge: HOME HEALTH | DRG: 305 | End: 2019-10-01
Attending: SURGERY | Admitting: SURGERY
Payer: MEDICAID

## 2019-09-28 ENCOUNTER — ANESTHESIA (OUTPATIENT)
Dept: OPERATING ROOM | Age: 54
DRG: 305 | End: 2019-09-28
Payer: MEDICAID

## 2019-09-28 ENCOUNTER — ANESTHESIA EVENT (OUTPATIENT)
Dept: OPERATING ROOM | Age: 54
DRG: 305 | End: 2019-09-28
Payer: MEDICAID

## 2019-09-28 VITALS
SYSTOLIC BLOOD PRESSURE: 103 MMHG | DIASTOLIC BLOOD PRESSURE: 65 MMHG | TEMPERATURE: 98.8 F | OXYGEN SATURATION: 100 % | RESPIRATION RATE: 9 BRPM

## 2019-09-28 DIAGNOSIS — S78.111A ABOVE KNEE AMPUTATION OF RIGHT LOWER EXTREMITY (HCC): Primary | ICD-10-CM

## 2019-09-28 LAB
GFR NON-AFRICAN AMERICAN: >60 ML/MIN
GFR SERPL CREATININE-BSD FRML MDRD: >60 ML/MIN
GFR SERPL CREATININE-BSD FRML MDRD: NORMAL ML/MIN/{1.73_M2}
GLUCOSE BLD-MCNC: 129 MG/DL (ref 75–110)
GLUCOSE BLD-MCNC: 141 MG/DL (ref 74–100)
POC CHLORIDE: 101 MMOL/L (ref 98–107)
POC CREATININE: 0.58 MG/DL (ref 0.51–1.19)
POC HEMATOCRIT: 35 % (ref 41–53)
POC HEMOGLOBIN: 11.9 G/DL (ref 13.5–17.5)
POC IONIZED CALCIUM: 1.23 MMOL/L (ref 1.15–1.33)
POC LACTIC ACID: 0.6 MMOL/L (ref 0.56–1.39)
POC POTASSIUM: 3.4 MMOL/L (ref 3.5–4.5)
POC SODIUM: 139 MMOL/L (ref 138–146)

## 2019-09-28 PROCEDURE — 2709999900 HC NON-CHARGEABLE SUPPLY: Performed by: SURGERY

## 2019-09-28 PROCEDURE — 6370000000 HC RX 637 (ALT 250 FOR IP): Performed by: STUDENT IN AN ORGANIZED HEALTH CARE EDUCATION/TRAINING PROGRAM

## 2019-09-28 PROCEDURE — 82565 ASSAY OF CREATININE: CPT

## 2019-09-28 PROCEDURE — 3600000013 HC SURGERY LEVEL 3 ADDTL 15MIN: Performed by: SURGERY

## 2019-09-28 PROCEDURE — 3700000001 HC ADD 15 MINUTES (ANESTHESIA): Performed by: SURGERY

## 2019-09-28 PROCEDURE — 2580000003 HC RX 258: Performed by: NURSE ANESTHETIST, CERTIFIED REGISTERED

## 2019-09-28 PROCEDURE — 6370000000 HC RX 637 (ALT 250 FOR IP): Performed by: ANESTHESIOLOGY

## 2019-09-28 PROCEDURE — 84132 ASSAY OF SERUM POTASSIUM: CPT

## 2019-09-28 PROCEDURE — 6360000002 HC RX W HCPCS: Performed by: NURSE ANESTHETIST, CERTIFIED REGISTERED

## 2019-09-28 PROCEDURE — 1200000000 HC SEMI PRIVATE

## 2019-09-28 PROCEDURE — 85014 HEMATOCRIT: CPT

## 2019-09-28 PROCEDURE — 3700000000 HC ANESTHESIA ATTENDED CARE: Performed by: SURGERY

## 2019-09-28 PROCEDURE — 27590 AMPUTATE LEG AT THIGH: CPT | Performed by: SURGERY

## 2019-09-28 PROCEDURE — 7100000000 HC PACU RECOVERY - FIRST 15 MIN: Performed by: SURGERY

## 2019-09-28 PROCEDURE — 2720000010 HC SURG SUPPLY STERILE: Performed by: SURGERY

## 2019-09-28 PROCEDURE — 2580000003 HC RX 258: Performed by: SURGERY

## 2019-09-28 PROCEDURE — 6360000002 HC RX W HCPCS: Performed by: ANESTHESIOLOGY

## 2019-09-28 PROCEDURE — 2580000003 HC RX 258: Performed by: STUDENT IN AN ORGANIZED HEALTH CARE EDUCATION/TRAINING PROGRAM

## 2019-09-28 PROCEDURE — 0Y6C0Z3 DETACHMENT AT RIGHT UPPER LEG, LOW, OPEN APPROACH: ICD-10-PCS | Performed by: SURGERY

## 2019-09-28 PROCEDURE — 6360000002 HC RX W HCPCS: Performed by: STUDENT IN AN ORGANIZED HEALTH CARE EDUCATION/TRAINING PROGRAM

## 2019-09-28 PROCEDURE — 88307 TISSUE EXAM BY PATHOLOGIST: CPT

## 2019-09-28 PROCEDURE — 84295 ASSAY OF SERUM SODIUM: CPT

## 2019-09-28 PROCEDURE — 82947 ASSAY GLUCOSE BLOOD QUANT: CPT

## 2019-09-28 PROCEDURE — 82435 ASSAY OF BLOOD CHLORIDE: CPT

## 2019-09-28 PROCEDURE — 82330 ASSAY OF CALCIUM: CPT

## 2019-09-28 PROCEDURE — 2500000003 HC RX 250 WO HCPCS: Performed by: NURSE ANESTHETIST, CERTIFIED REGISTERED

## 2019-09-28 PROCEDURE — 83605 ASSAY OF LACTIC ACID: CPT

## 2019-09-28 PROCEDURE — 3600000003 HC SURGERY LEVEL 3 BASE: Performed by: SURGERY

## 2019-09-28 PROCEDURE — 7100000001 HC PACU RECOVERY - ADDTL 15 MIN: Performed by: SURGERY

## 2019-09-28 RX ORDER — DIPHENHYDRAMINE HYDROCHLORIDE 50 MG/ML
12.5 INJECTION INTRAMUSCULAR; INTRAVENOUS
Status: DISCONTINUED | OUTPATIENT
Start: 2019-09-28 | End: 2019-09-28 | Stop reason: HOSPADM

## 2019-09-28 RX ORDER — SODIUM CHLORIDE, SODIUM LACTATE, POTASSIUM CHLORIDE, CALCIUM CHLORIDE 600; 310; 30; 20 MG/100ML; MG/100ML; MG/100ML; MG/100ML
INJECTION, SOLUTION INTRAVENOUS CONTINUOUS
Status: ACTIVE | OUTPATIENT
Start: 2019-09-28 | End: 2019-09-28

## 2019-09-28 RX ORDER — OXYCODONE HYDROCHLORIDE AND ACETAMINOPHEN 5; 325 MG/1; MG/1
1 TABLET ORAL PRN
Status: COMPLETED | OUTPATIENT
Start: 2019-09-28 | End: 2019-09-28

## 2019-09-28 RX ORDER — SODIUM CHLORIDE, SODIUM LACTATE, POTASSIUM CHLORIDE, CALCIUM CHLORIDE 600; 310; 30; 20 MG/100ML; MG/100ML; MG/100ML; MG/100ML
INJECTION, SOLUTION INTRAVENOUS CONTINUOUS PRN
Status: DISCONTINUED | OUTPATIENT
Start: 2019-09-28 | End: 2019-09-28 | Stop reason: SDUPTHER

## 2019-09-28 RX ORDER — VANCOMYCIN HYDROCHLORIDE 1 G/20ML
INJECTION, POWDER, LYOPHILIZED, FOR SOLUTION INTRAVENOUS PRN
Status: DISCONTINUED | OUTPATIENT
Start: 2019-09-28 | End: 2019-09-28 | Stop reason: SDUPTHER

## 2019-09-28 RX ORDER — FENTANYL CITRATE 50 UG/ML
25 INJECTION, SOLUTION INTRAMUSCULAR; INTRAVENOUS EVERY 5 MIN PRN
Status: COMPLETED | OUTPATIENT
Start: 2019-09-28 | End: 2019-09-28

## 2019-09-28 RX ORDER — MORPHINE SULFATE 2 MG/ML
2 INJECTION, SOLUTION INTRAMUSCULAR; INTRAVENOUS EVERY 5 MIN PRN
Status: DISCONTINUED | OUTPATIENT
Start: 2019-09-28 | End: 2019-09-28 | Stop reason: HOSPADM

## 2019-09-28 RX ORDER — ACETAMINOPHEN 500 MG
1000 TABLET ORAL EVERY 8 HOURS SCHEDULED
Status: DISCONTINUED | OUTPATIENT
Start: 2019-09-28 | End: 2019-10-01 | Stop reason: HOSPADM

## 2019-09-28 RX ORDER — SODIUM CHLORIDE 0.9 % (FLUSH) 0.9 %
10 SYRINGE (ML) INJECTION EVERY 12 HOURS SCHEDULED
Status: DISCONTINUED | OUTPATIENT
Start: 2019-09-28 | End: 2019-10-01 | Stop reason: HOSPADM

## 2019-09-28 RX ORDER — ASPIRIN 81 MG/1
81 TABLET, CHEWABLE ORAL DAILY
Status: DISCONTINUED | OUTPATIENT
Start: 2019-09-28 | End: 2019-10-01 | Stop reason: HOSPADM

## 2019-09-28 RX ORDER — CLOPIDOGREL BISULFATE 75 MG/1
75 TABLET ORAL DAILY
Status: DISCONTINUED | OUTPATIENT
Start: 2019-09-29 | End: 2019-10-01 | Stop reason: HOSPADM

## 2019-09-28 RX ORDER — GLYCOPYRROLATE 1 MG/5 ML
SYRINGE (ML) INTRAVENOUS PRN
Status: DISCONTINUED | OUTPATIENT
Start: 2019-09-28 | End: 2019-09-28 | Stop reason: SDUPTHER

## 2019-09-28 RX ORDER — ONDANSETRON 2 MG/ML
INJECTION INTRAMUSCULAR; INTRAVENOUS PRN
Status: DISCONTINUED | OUTPATIENT
Start: 2019-09-28 | End: 2019-09-28 | Stop reason: SDUPTHER

## 2019-09-28 RX ORDER — ATORVASTATIN CALCIUM 40 MG/1
40 TABLET, FILM COATED ORAL NIGHTLY
Status: DISCONTINUED | OUTPATIENT
Start: 2019-09-28 | End: 2019-10-01 | Stop reason: HOSPADM

## 2019-09-28 RX ORDER — GABAPENTIN 300 MG/1
600 CAPSULE ORAL EVERY 8 HOURS
Status: DISCONTINUED | OUTPATIENT
Start: 2019-09-28 | End: 2019-10-01 | Stop reason: HOSPADM

## 2019-09-28 RX ORDER — LIDOCAINE HYDROCHLORIDE 10 MG/ML
INJECTION, SOLUTION EPIDURAL; INFILTRATION; INTRACAUDAL; PERINEURAL PRN
Status: DISCONTINUED | OUTPATIENT
Start: 2019-09-28 | End: 2019-09-28 | Stop reason: SDUPTHER

## 2019-09-28 RX ORDER — DEXAMETHASONE SODIUM PHOSPHATE 10 MG/ML
INJECTION INTRAMUSCULAR; INTRAVENOUS PRN
Status: DISCONTINUED | OUTPATIENT
Start: 2019-09-28 | End: 2019-09-28 | Stop reason: SDUPTHER

## 2019-09-28 RX ORDER — SODIUM CHLORIDE 0.9 % (FLUSH) 0.9 %
10 SYRINGE (ML) INJECTION PRN
Status: DISCONTINUED | OUTPATIENT
Start: 2019-09-28 | End: 2019-10-01 | Stop reason: HOSPADM

## 2019-09-28 RX ORDER — ROCURONIUM BROMIDE 10 MG/ML
INJECTION, SOLUTION INTRAVENOUS PRN
Status: DISCONTINUED | OUTPATIENT
Start: 2019-09-28 | End: 2019-09-28 | Stop reason: SDUPTHER

## 2019-09-28 RX ORDER — FENTANYL CITRATE 50 UG/ML
INJECTION, SOLUTION INTRAMUSCULAR; INTRAVENOUS PRN
Status: DISCONTINUED | OUTPATIENT
Start: 2019-09-28 | End: 2019-09-28 | Stop reason: SDUPTHER

## 2019-09-28 RX ORDER — M-VIT,TX,IRON,MINS/CALC/FOLIC 27MG-0.4MG
1 TABLET ORAL
Status: DISCONTINUED | OUTPATIENT
Start: 2019-09-29 | End: 2019-10-01 | Stop reason: HOSPADM

## 2019-09-28 RX ORDER — LISINOPRIL 5 MG/1
5 TABLET ORAL DAILY
Status: DISCONTINUED | OUTPATIENT
Start: 2019-09-28 | End: 2019-10-01 | Stop reason: HOSPADM

## 2019-09-28 RX ORDER — ACETAMINOPHEN 325 MG/1
650 TABLET ORAL EVERY 4 HOURS PRN
Status: DISCONTINUED | OUTPATIENT
Start: 2019-09-28 | End: 2019-09-28 | Stop reason: SDUPTHER

## 2019-09-28 RX ORDER — LABETALOL HYDROCHLORIDE 5 MG/ML
5 INJECTION, SOLUTION INTRAVENOUS EVERY 10 MIN PRN
Status: DISCONTINUED | OUTPATIENT
Start: 2019-09-28 | End: 2019-09-28 | Stop reason: HOSPADM

## 2019-09-28 RX ORDER — IBUPROFEN 800 MG/1
800 TABLET ORAL EVERY 8 HOURS
Status: DISCONTINUED | OUTPATIENT
Start: 2019-09-28 | End: 2019-10-01 | Stop reason: HOSPADM

## 2019-09-28 RX ORDER — TRAZODONE HYDROCHLORIDE 50 MG/1
50 TABLET ORAL NIGHTLY
Status: DISCONTINUED | OUTPATIENT
Start: 2019-09-28 | End: 2019-10-01 | Stop reason: HOSPADM

## 2019-09-28 RX ORDER — OXYCODONE HYDROCHLORIDE 5 MG/1
5 TABLET ORAL EVERY 4 HOURS PRN
Status: DISCONTINUED | OUTPATIENT
Start: 2019-09-28 | End: 2019-09-30

## 2019-09-28 RX ORDER — NEOSTIGMINE METHYLSULFATE 5 MG/5 ML
SYRINGE (ML) INTRAVENOUS PRN
Status: DISCONTINUED | OUTPATIENT
Start: 2019-09-28 | End: 2019-09-28 | Stop reason: SDUPTHER

## 2019-09-28 RX ORDER — PROPOFOL 10 MG/ML
INJECTION, EMULSION INTRAVENOUS PRN
Status: DISCONTINUED | OUTPATIENT
Start: 2019-09-28 | End: 2019-09-28 | Stop reason: SDUPTHER

## 2019-09-28 RX ORDER — FUROSEMIDE 20 MG/1
20 TABLET ORAL DAILY
Status: DISCONTINUED | OUTPATIENT
Start: 2019-09-28 | End: 2019-10-01 | Stop reason: HOSPADM

## 2019-09-28 RX ORDER — OXYCODONE HYDROCHLORIDE AND ACETAMINOPHEN 5; 325 MG/1; MG/1
2 TABLET ORAL PRN
Status: COMPLETED | OUTPATIENT
Start: 2019-09-28 | End: 2019-09-28

## 2019-09-28 RX ORDER — ONDANSETRON 2 MG/ML
4 INJECTION INTRAMUSCULAR; INTRAVENOUS
Status: DISCONTINUED | OUTPATIENT
Start: 2019-09-28 | End: 2019-09-28 | Stop reason: HOSPADM

## 2019-09-28 RX ORDER — MAGNESIUM HYDROXIDE 1200 MG/15ML
LIQUID ORAL CONTINUOUS PRN
Status: COMPLETED | OUTPATIENT
Start: 2019-09-28 | End: 2019-09-28

## 2019-09-28 RX ADMIN — ACETAMINOPHEN 1000 MG: 500 TABLET ORAL at 21:57

## 2019-09-28 RX ADMIN — ONDANSETRON 4 MG: 2 INJECTION, SOLUTION INTRAMUSCULAR; INTRAVENOUS at 07:52

## 2019-09-28 RX ADMIN — PHENYLEPHRINE HYDROCHLORIDE 200 MCG: 10 INJECTION INTRAVENOUS at 08:01

## 2019-09-28 RX ADMIN — FENTANYL CITRATE 50 MCG: 50 INJECTION INTRAMUSCULAR; INTRAVENOUS at 08:36

## 2019-09-28 RX ADMIN — OXYCODONE HYDROCHLORIDE 5 MG: 5 TABLET ORAL at 10:58

## 2019-09-28 RX ADMIN — Medication 0.8 MG: at 08:50

## 2019-09-28 RX ADMIN — SODIUM CHLORIDE, POTASSIUM CHLORIDE, SODIUM LACTATE AND CALCIUM CHLORIDE: 600; 310; 30; 20 INJECTION, SOLUTION INTRAVENOUS at 07:41

## 2019-09-28 RX ADMIN — Medication 4 MG: at 08:50

## 2019-09-28 RX ADMIN — HYDROMORPHONE HYDROCHLORIDE 0.5 MG: 1 INJECTION, SOLUTION INTRAMUSCULAR; INTRAVENOUS; SUBCUTANEOUS at 12:42

## 2019-09-28 RX ADMIN — FENTANYL CITRATE 25 MCG: 50 INJECTION INTRAMUSCULAR; INTRAVENOUS at 09:27

## 2019-09-28 RX ADMIN — DESMOPRESSIN ACETATE 40 MG: 0.2 TABLET ORAL at 21:57

## 2019-09-28 RX ADMIN — FENTANYL CITRATE 50 MCG: 50 INJECTION INTRAMUSCULAR; INTRAVENOUS at 08:04

## 2019-09-28 RX ADMIN — FENTANYL CITRATE 25 MCG: 50 INJECTION INTRAMUSCULAR; INTRAVENOUS at 09:22

## 2019-09-28 RX ADMIN — OXYCODONE HYDROCHLORIDE AND ACETAMINOPHEN 2 TABLET: 5; 325 TABLET ORAL at 09:22

## 2019-09-28 RX ADMIN — PIPERACILLIN AND TAZOBACTAM 3.38 G: 3; .375 INJECTION, POWDER, LYOPHILIZED, FOR SOLUTION INTRAVENOUS at 16:56

## 2019-09-28 RX ADMIN — TRAZODONE HYDROCHLORIDE 50 MG: 50 TABLET ORAL at 21:57

## 2019-09-28 RX ADMIN — VANCOMYCIN HYDROCHLORIDE 1000 MG: 1 INJECTION, POWDER, LYOPHILIZED, FOR SOLUTION INTRAVENOUS at 07:52

## 2019-09-28 RX ADMIN — PROPOFOL 100 MG: 10 INJECTION, EMULSION INTRAVENOUS at 07:46

## 2019-09-28 RX ADMIN — PHENYLEPHRINE HYDROCHLORIDE 200 MCG: 10 INJECTION INTRAVENOUS at 07:57

## 2019-09-28 RX ADMIN — METOPROLOL TARTRATE 25 MG: 25 TABLET ORAL at 21:57

## 2019-09-28 RX ADMIN — IBUPROFEN 800 MG: 800 TABLET, FILM COATED ORAL at 15:09

## 2019-09-28 RX ADMIN — HYDROMORPHONE HYDROCHLORIDE 0.5 MG: 1 INJECTION, SOLUTION INTRAMUSCULAR; INTRAVENOUS; SUBCUTANEOUS at 17:01

## 2019-09-28 RX ADMIN — FENTANYL CITRATE 50 MCG: 50 INJECTION INTRAMUSCULAR; INTRAVENOUS at 08:52

## 2019-09-28 RX ADMIN — GABAPENTIN 600 MG: 300 CAPSULE ORAL at 15:08

## 2019-09-28 RX ADMIN — FENTANYL CITRATE 25 MCG: 50 INJECTION INTRAMUSCULAR; INTRAVENOUS at 09:32

## 2019-09-28 RX ADMIN — DEXAMETHASONE SODIUM PHOSPHATE 10 MG: 10 INJECTION INTRAMUSCULAR; INTRAVENOUS at 07:52

## 2019-09-28 RX ADMIN — PHENYLEPHRINE HYDROCHLORIDE 100 MCG: 10 INJECTION INTRAVENOUS at 08:12

## 2019-09-28 RX ADMIN — FENTANYL CITRATE 25 MCG: 50 INJECTION INTRAMUSCULAR; INTRAVENOUS at 09:37

## 2019-09-28 RX ADMIN — PHENYLEPHRINE HYDROCHLORIDE 200 MCG: 10 INJECTION INTRAVENOUS at 07:53

## 2019-09-28 RX ADMIN — SODIUM CHLORIDE, POTASSIUM CHLORIDE, SODIUM LACTATE AND CALCIUM CHLORIDE: 600; 310; 30; 20 INJECTION, SOLUTION INTRAVENOUS at 10:59

## 2019-09-28 RX ADMIN — ROCURONIUM BROMIDE 30 MG: 10 INJECTION INTRAVENOUS at 07:46

## 2019-09-28 RX ADMIN — FENTANYL CITRATE 50 MCG: 50 INJECTION INTRAMUSCULAR; INTRAVENOUS at 07:46

## 2019-09-28 RX ADMIN — ACETAMINOPHEN 1000 MG: 500 TABLET ORAL at 15:08

## 2019-09-28 RX ADMIN — FENTANYL CITRATE 50 MCG: 50 INJECTION INTRAMUSCULAR; INTRAVENOUS at 08:18

## 2019-09-28 RX ADMIN — LIDOCAINE HYDROCHLORIDE 50 MG: 10 INJECTION, SOLUTION EPIDURAL; INFILTRATION; INTRACAUDAL; PERINEURAL at 07:46

## 2019-09-28 RX ADMIN — LISINOPRIL 5 MG: 5 TABLET ORAL at 15:09

## 2019-09-28 ASSESSMENT — PULMONARY FUNCTION TESTS
PIF_VALUE: 3
PIF_VALUE: 17
PIF_VALUE: 17
PIF_VALUE: 16
PIF_VALUE: 17
PIF_VALUE: 16
PIF_VALUE: 17
PIF_VALUE: 17
PIF_VALUE: 16
PIF_VALUE: 17
PIF_VALUE: 16
PIF_VALUE: 1
PIF_VALUE: 17
PIF_VALUE: 17
PIF_VALUE: 16
PIF_VALUE: 0
PIF_VALUE: 16
PIF_VALUE: 17
PIF_VALUE: 16
PIF_VALUE: 16
PIF_VALUE: 2
PIF_VALUE: 16
PIF_VALUE: 1
PIF_VALUE: 2
PIF_VALUE: 25
PIF_VALUE: 2
PIF_VALUE: 0
PIF_VALUE: 17
PIF_VALUE: 17
PIF_VALUE: 16
PIF_VALUE: 15
PIF_VALUE: 17
PIF_VALUE: 15
PIF_VALUE: 16
PIF_VALUE: 17
PIF_VALUE: 0
PIF_VALUE: 17
PIF_VALUE: 3
PIF_VALUE: 17
PIF_VALUE: 17
PIF_VALUE: 13
PIF_VALUE: 15
PIF_VALUE: 17
PIF_VALUE: 17
PIF_VALUE: 21
PIF_VALUE: 17
PIF_VALUE: 17
PIF_VALUE: 16
PIF_VALUE: 17
PIF_VALUE: 16
PIF_VALUE: 17
PIF_VALUE: 21
PIF_VALUE: 16
PIF_VALUE: 17
PIF_VALUE: 17
PIF_VALUE: 0
PIF_VALUE: 16
PIF_VALUE: 15
PIF_VALUE: 16
PIF_VALUE: 17
PIF_VALUE: 16
PIF_VALUE: 16
PIF_VALUE: 17

## 2019-09-28 ASSESSMENT — PAIN DESCRIPTION - LOCATION
LOCATION: LEG

## 2019-09-28 ASSESSMENT — PAIN SCALES - GENERAL
PAINLEVEL_OUTOF10: 3
PAINLEVEL_OUTOF10: 5
PAINLEVEL_OUTOF10: 4
PAINLEVEL_OUTOF10: 3
PAINLEVEL_OUTOF10: 5
PAINLEVEL_OUTOF10: 5
PAINLEVEL_OUTOF10: 0
PAINLEVEL_OUTOF10: 3
PAINLEVEL_OUTOF10: 0
PAINLEVEL_OUTOF10: 3
PAINLEVEL_OUTOF10: 5
PAINLEVEL_OUTOF10: 0
PAINLEVEL_OUTOF10: 5
PAINLEVEL_OUTOF10: 3
PAINLEVEL_OUTOF10: 0
PAINLEVEL_OUTOF10: 5

## 2019-09-28 ASSESSMENT — PAIN DESCRIPTION - PAIN TYPE
TYPE: ACUTE PAIN;SURGICAL PAIN
TYPE: ACUTE PAIN
TYPE: ACUTE PAIN;SURGICAL PAIN
TYPE: SURGICAL PAIN
TYPE: SURGICAL PAIN
TYPE: ACUTE PAIN
TYPE: SURGICAL PAIN
TYPE: SURGICAL PAIN

## 2019-09-28 ASSESSMENT — PAIN DESCRIPTION - ORIENTATION
ORIENTATION: RIGHT
ORIENTATION: LEFT
ORIENTATION: RIGHT
ORIENTATION: RIGHT
ORIENTATION: LEFT
ORIENTATION: RIGHT
ORIENTATION: RIGHT
ORIENTATION: LEFT

## 2019-09-28 ASSESSMENT — PAIN DESCRIPTION - ONSET
ONSET: GRADUAL
ONSET: GRADUAL
ONSET: ON-GOING

## 2019-09-28 ASSESSMENT — PAIN DESCRIPTION - PROGRESSION: CLINICAL_PROGRESSION: NOT CHANGED

## 2019-09-28 ASSESSMENT — PAIN DESCRIPTION - FREQUENCY
FREQUENCY: INTERMITTENT

## 2019-09-28 ASSESSMENT — LIFESTYLE VARIABLES: SMOKING_STATUS: 0

## 2019-09-28 ASSESSMENT — PAIN DESCRIPTION - DESCRIPTORS
DESCRIPTORS: THROBBING
DESCRIPTORS: ACHING
DESCRIPTORS: ACHING

## 2019-09-28 NOTE — ANESTHESIA PRE PROCEDURE
CABG performed by Yifan Wiggins MD at [address] N/A 2019    HARDWARE REMOVAL RIGHT TIBIA, IRRIGATION AND DEBRIDEMENT [facility] performed by Kwame Chavira MD at [address] Right 2000    Brian  off roof, external fixation and internal hardware    TONSILLECTOMY  1974       Social History:    Social History     Tobacco Use    Smoking status: Former Smoker     Packs/day: 1.00     Years: 20.00     Pack years: 20.00     Last attempt to quit: 2019     Years since quittin.4    Smokeless tobacco: Never Used   Substance Use Topics    Alcohol use: No     Comment: RECOVERING X 20 YEARS                                Counseling given: Not Answered      Vital Signs (Current):   Vitals:    19 1336 19 0700   BP:  (!) 106/49   Pulse:  71   Resp:  12   Temp:  97.2 °F (36.2 °C)   TempSrc:  Temporal   SpO2:  94%   Weight: 136 lb (61.7 kg)    Height: 5' 5\" (1.651 m)                                               BP Readings from Last 3 Encounters:   19 (!) 106/49   19 122/70   19 (!) 140/82       NPO Status: Time of last liquid consumption:                         Time of last solid consumption:                         Date of last liquid consumption: 19                        Date of last solid food consumption: 19    BMI:   Wt Readings from Last 3 Encounters:   19 136 lb (61.7 kg)   19 137 lb (62.1 kg)   19 138 lb (62.6 kg)     Body mass index is 22.63 kg/m².     CBC:   Lab Results   Component Value Date    WBC 10.6 2019    RBC 3.54 2019    HGB 10.1 2019    HCT 32.1 2019    MCV 90.7 2019    RDW 13.1 2019     2019       CMP:   Lab Results   Component Value Date     2019    K 4.0 2019    CL 92 2019    CO2 23 2019    BUN 9 2019    CREATININE 0.60 2019    GFRAA >60 2019    LABGLOM >60 2019 GLUCOSE 321 08/30/2019    PROT 7.2 05/04/2019    CALCIUM 9.1 08/30/2019    BILITOT 0.61 05/04/2019    ALKPHOS 132 05/04/2019    AST 26 05/04/2019    ALT 43 05/04/2019       POC Tests:   No results for input(s): POCGLU, POCNA, POCK, POCCL, POCBUN, POCHEMO, POCHCT in the last 72 hours. Coags:   Lab Results   Component Value Date    PROTIME 12.9 04/25/2019    INR 1.2 04/25/2019    APTT 25.7 04/07/2019       HCG (If Applicable): No results found for: PREGTESTUR, PREGSERUM, HCG, HCGQUANT     ABGs: No results found for: PHART, PO2ART, RIT5TWI, SRN4AFF, BEART, D5SOTLKZ     Type & Screen (If Applicable):  No results found for: [facility]    Anesthesia Evaluation  Patient summary reviewed no history of anesthetic complications:   Airway: Mallampati: II  TM distance: >3 FB   Neck ROM: full  Mouth opening: > = 3 FB Dental:    (+) edentulous  Comment: 1-2 teeth left, none loose    Pulmonary:Negative Pulmonary ROS and normal exam        (-) not a current smoker                          ROS comment: 20 pk yrs   Cardiovascular:    (+) hypertension: no interval change and moderate, CAD: no interval change, CABG/stent:, orthopnea,       ECG reviewed  Rhythm: regular  Rate: normal  Echocardiogram reviewed               ROS comment: Hx PEA     Neuro/Psych:   (+) CVA: no interval change,             GI/Hepatic/Renal:   (+) hepatitis: C, liver disease:,           Endo/Other:    (+) DiabetesType II DM, poorly controlled, using insulin, . Abdominal:           Vascular: negative vascular ROS.  + PVD, aortic or cerebral, . Anesthesia Plan      general     ASA 4     (Asa 4)  Induction: intravenous. MIPS: Postoperative opioids intended. Anesthetic plan and risks discussed with patient. Plan discussed with CRNA.                   Merline Poke, MD   9/28/2019

## 2019-09-28 NOTE — ANESTHESIA POSTPROCEDURE EVALUATION
Department of Anesthesiology  Postprocedure Note    Patient: Markos Madrid  MRN: [id number]  YOB: 1965  Date of evaluation: 9/28/2019  Time:  10:49 AM     Procedure Summary     Date:  09/28/19 Room / Location:  [address] OR    Anesthesia Start:  0741 Anesthesia Stop:  0900    Procedure:  RIGHT ABOVE KNEE AMPUTATION (Right ) Diagnosis:  (RIGHT BELOW KNEE INFECTION, COMPLICATION)    Surgeon:  Luana Mata MD Responsible Provider:  Donald Kilgore MD    Anesthesia Type:  general ASA Status:  4          Anesthesia Type: general    Gricelda Phase I: Gricelda Score: 10    Gricelda Phase II:      Last vitals: Reviewed and per EMR flowsheets.        Anesthesia Post Evaluation    Patient location during evaluation: PACU  Patient participation: complete - patient participated  Level of consciousness: awake and alert  Pain score: 3  Nausea & Vomiting: no nausea  Cardiovascular status: hemodynamically stable  Respiratory status: room air  Hydration status: euvolemic

## 2019-09-29 LAB
GLUCOSE BLD-MCNC: 149 MG/DL (ref 75–110)
GLUCOSE BLD-MCNC: 214 MG/DL (ref 75–110)
GLUCOSE BLD-MCNC: 261 MG/DL (ref 75–110)
GLUCOSE BLD-MCNC: 288 MG/DL (ref 75–110)

## 2019-09-29 PROCEDURE — 82947 ASSAY GLUCOSE BLOOD QUANT: CPT

## 2019-09-29 PROCEDURE — 6360000002 HC RX W HCPCS: Performed by: STUDENT IN AN ORGANIZED HEALTH CARE EDUCATION/TRAINING PROGRAM

## 2019-09-29 PROCEDURE — 6370000000 HC RX 637 (ALT 250 FOR IP): Performed by: STUDENT IN AN ORGANIZED HEALTH CARE EDUCATION/TRAINING PROGRAM

## 2019-09-29 PROCEDURE — 1200000000 HC SEMI PRIVATE

## 2019-09-29 PROCEDURE — 97530 THERAPEUTIC ACTIVITIES: CPT

## 2019-09-29 PROCEDURE — 2580000003 HC RX 258: Performed by: STUDENT IN AN ORGANIZED HEALTH CARE EDUCATION/TRAINING PROGRAM

## 2019-09-29 PROCEDURE — 97162 PT EVAL MOD COMPLEX 30 MIN: CPT

## 2019-09-29 RX ORDER — DEXTROSE MONOHYDRATE 25 G/50ML
12.5 INJECTION, SOLUTION INTRAVENOUS PRN
Status: DISCONTINUED | OUTPATIENT
Start: 2019-09-29 | End: 2019-10-01 | Stop reason: HOSPADM

## 2019-09-29 RX ORDER — DEXTROSE MONOHYDRATE 50 MG/ML
100 INJECTION, SOLUTION INTRAVENOUS PRN
Status: DISCONTINUED | OUTPATIENT
Start: 2019-09-29 | End: 2019-10-01 | Stop reason: HOSPADM

## 2019-09-29 RX ORDER — NICOTINE POLACRILEX 4 MG
15 LOZENGE BUCCAL PRN
Status: DISCONTINUED | OUTPATIENT
Start: 2019-09-29 | End: 2019-10-01 | Stop reason: HOSPADM

## 2019-09-29 RX ADMIN — GABAPENTIN 600 MG: 300 CAPSULE ORAL at 12:02

## 2019-09-29 RX ADMIN — DESMOPRESSIN ACETATE 40 MG: 0.2 TABLET ORAL at 20:35

## 2019-09-29 RX ADMIN — PIPERACILLIN AND TAZOBACTAM 3.38 G: 3; .375 INJECTION, POWDER, LYOPHILIZED, FOR SOLUTION INTRAVENOUS at 02:47

## 2019-09-29 RX ADMIN — INSULIN LISPRO 3 UNITS: 100 INJECTION, SOLUTION INTRAVENOUS; SUBCUTANEOUS at 23:27

## 2019-09-29 RX ADMIN — IBUPROFEN 800 MG: 800 TABLET, FILM COATED ORAL at 12:02

## 2019-09-29 RX ADMIN — METOPROLOL TARTRATE 25 MG: 25 TABLET ORAL at 20:35

## 2019-09-29 RX ADMIN — GABAPENTIN 600 MG: 300 CAPSULE ORAL at 02:47

## 2019-09-29 RX ADMIN — INSULIN LISPRO 9 UNITS: 100 INJECTION, SOLUTION INTRAVENOUS; SUBCUTANEOUS at 12:03

## 2019-09-29 RX ADMIN — TRAZODONE HYDROCHLORIDE 50 MG: 50 TABLET ORAL at 22:39

## 2019-09-29 RX ADMIN — ACETAMINOPHEN 1000 MG: 500 TABLET ORAL at 06:22

## 2019-09-29 RX ADMIN — ACETAMINOPHEN 1000 MG: 500 TABLET ORAL at 15:43

## 2019-09-29 RX ADMIN — Medication 10 ML: at 09:08

## 2019-09-29 RX ADMIN — INSULIN LISPRO 3 UNITS: 100 INJECTION, SOLUTION INTRAVENOUS; SUBCUTANEOUS at 17:10

## 2019-09-29 RX ADMIN — OXYCODONE HYDROCHLORIDE 5 MG: 5 TABLET ORAL at 20:35

## 2019-09-29 RX ADMIN — METOPROLOL TARTRATE 25 MG: 25 TABLET ORAL at 09:07

## 2019-09-29 RX ADMIN — GABAPENTIN 600 MG: 300 CAPSULE ORAL at 20:35

## 2019-09-29 RX ADMIN — HYDROMORPHONE HYDROCHLORIDE 0.5 MG: 1 INJECTION, SOLUTION INTRAMUSCULAR; INTRAVENOUS; SUBCUTANEOUS at 09:05

## 2019-09-29 RX ADMIN — ENOXAPARIN SODIUM 40 MG: 40 INJECTION SUBCUTANEOUS at 09:08

## 2019-09-29 RX ADMIN — MULTIPLE VITAMINS W/ MINERALS TAB 1 TABLET: TAB at 09:08

## 2019-09-29 RX ADMIN — IBUPROFEN 800 MG: 800 TABLET, FILM COATED ORAL at 02:47

## 2019-09-29 RX ADMIN — FUROSEMIDE 20 MG: 20 TABLET ORAL at 09:08

## 2019-09-29 RX ADMIN — ASPIRIN 81 MG: 81 TABLET, CHEWABLE ORAL at 09:07

## 2019-09-29 RX ADMIN — Medication 10 ML: at 20:35

## 2019-09-29 RX ADMIN — LISINOPRIL 5 MG: 5 TABLET ORAL at 09:08

## 2019-09-29 RX ADMIN — ACETAMINOPHEN 1000 MG: 500 TABLET ORAL at 22:39

## 2019-09-29 RX ADMIN — CLOPIDOGREL 75 MG: 75 TABLET, FILM COATED ORAL at 09:07

## 2019-09-29 ASSESSMENT — PAIN SCALES - GENERAL
PAINLEVEL_OUTOF10: 0
PAINLEVEL_OUTOF10: 2
PAINLEVEL_OUTOF10: 4
PAINLEVEL_OUTOF10: 3
PAINLEVEL_OUTOF10: 5

## 2019-09-29 ASSESSMENT — PAIN DESCRIPTION - PAIN TYPE
TYPE: SURGICAL PAIN
TYPE: ACUTE PAIN;SURGICAL PAIN

## 2019-09-29 ASSESSMENT — PAIN DESCRIPTION - LOCATION
LOCATION: LEG
LOCATION: LEG

## 2019-09-29 ASSESSMENT — PAIN DESCRIPTION - ORIENTATION
ORIENTATION: RIGHT
ORIENTATION: RIGHT

## 2019-09-30 LAB
GLUCOSE BLD-MCNC: 155 MG/DL (ref 75–110)
GLUCOSE BLD-MCNC: 174 MG/DL (ref 75–110)
GLUCOSE BLD-MCNC: 188 MG/DL (ref 75–110)
GLUCOSE BLD-MCNC: 231 MG/DL (ref 75–110)
GLUCOSE BLD-MCNC: 92 MG/DL (ref 75–110)

## 2019-09-30 PROCEDURE — 6370000000 HC RX 637 (ALT 250 FOR IP): Performed by: STUDENT IN AN ORGANIZED HEALTH CARE EDUCATION/TRAINING PROGRAM

## 2019-09-30 PROCEDURE — 6360000002 HC RX W HCPCS: Performed by: STUDENT IN AN ORGANIZED HEALTH CARE EDUCATION/TRAINING PROGRAM

## 2019-09-30 PROCEDURE — 1200000000 HC SEMI PRIVATE

## 2019-09-30 PROCEDURE — 2580000003 HC RX 258: Performed by: STUDENT IN AN ORGANIZED HEALTH CARE EDUCATION/TRAINING PROGRAM

## 2019-09-30 PROCEDURE — 82947 ASSAY GLUCOSE BLOOD QUANT: CPT

## 2019-09-30 RX ORDER — OXYCODONE HYDROCHLORIDE 5 MG/1
10 TABLET ORAL EVERY 4 HOURS PRN
Status: DISCONTINUED | OUTPATIENT
Start: 2019-09-30 | End: 2019-10-01 | Stop reason: HOSPADM

## 2019-09-30 RX ORDER — IBUPROFEN 800 MG/1
800 TABLET ORAL EVERY 8 HOURS PRN
Qty: 120 TABLET | Refills: 3 | Status: SHIPPED | OUTPATIENT
Start: 2019-09-30 | End: 2021-12-23

## 2019-09-30 RX ORDER — OXYCODONE HYDROCHLORIDE 5 MG/1
5 TABLET ORAL EVERY 6 HOURS PRN
Qty: 28 TABLET | Refills: 0 | Status: SHIPPED | OUTPATIENT
Start: 2019-09-30 | End: 2019-10-07

## 2019-09-30 RX ORDER — CLOPIDOGREL BISULFATE 75 MG/1
75 TABLET ORAL DAILY
Qty: 30 TABLET | Refills: 3 | Status: SHIPPED | OUTPATIENT
Start: 2019-09-30 | End: 2019-12-02 | Stop reason: SDUPTHER

## 2019-09-30 RX ORDER — ACETAMINOPHEN 500 MG
1000 TABLET ORAL EVERY 8 HOURS PRN
Qty: 120 TABLET | Refills: 3 | Status: SHIPPED | OUTPATIENT
Start: 2019-09-30

## 2019-09-30 RX ADMIN — MULTIPLE VITAMINS W/ MINERALS TAB 1 TABLET: TAB at 08:40

## 2019-09-30 RX ADMIN — OXYCODONE HYDROCHLORIDE 5 MG: 5 TABLET ORAL at 08:31

## 2019-09-30 RX ADMIN — IBUPROFEN 800 MG: 800 TABLET, FILM COATED ORAL at 12:02

## 2019-09-30 RX ADMIN — Medication 10 ML: at 20:19

## 2019-09-30 RX ADMIN — OXYCODONE HYDROCHLORIDE 10 MG: 5 TABLET ORAL at 12:24

## 2019-09-30 RX ADMIN — METOPROLOL TARTRATE 25 MG: 25 TABLET ORAL at 20:19

## 2019-09-30 RX ADMIN — OXYCODONE HYDROCHLORIDE 10 MG: 5 TABLET ORAL at 18:17

## 2019-09-30 RX ADMIN — TRAZODONE HYDROCHLORIDE 50 MG: 50 TABLET ORAL at 22:22

## 2019-09-30 RX ADMIN — CLOPIDOGREL 75 MG: 75 TABLET, FILM COATED ORAL at 08:41

## 2019-09-30 RX ADMIN — OXYCODONE HYDROCHLORIDE 10 MG: 5 TABLET ORAL at 22:22

## 2019-09-30 RX ADMIN — INSULIN LISPRO 3 UNITS: 100 INJECTION, SOLUTION INTRAVENOUS; SUBCUTANEOUS at 08:50

## 2019-09-30 RX ADMIN — Medication 10 ML: at 08:41

## 2019-09-30 RX ADMIN — GABAPENTIN 600 MG: 300 CAPSULE ORAL at 20:19

## 2019-09-30 RX ADMIN — INSULIN LISPRO 6 UNITS: 100 INJECTION, SOLUTION INTRAVENOUS; SUBCUTANEOUS at 12:03

## 2019-09-30 RX ADMIN — IBUPROFEN 800 MG: 800 TABLET, FILM COATED ORAL at 20:19

## 2019-09-30 RX ADMIN — HYDROMORPHONE HYDROCHLORIDE 0.5 MG: 1 INJECTION, SOLUTION INTRAMUSCULAR; INTRAVENOUS; SUBCUTANEOUS at 03:46

## 2019-09-30 RX ADMIN — GABAPENTIN 600 MG: 300 CAPSULE ORAL at 05:07

## 2019-09-30 RX ADMIN — ASPIRIN 81 MG: 81 TABLET, CHEWABLE ORAL at 08:41

## 2019-09-30 RX ADMIN — METOPROLOL TARTRATE 25 MG: 25 TABLET ORAL at 08:40

## 2019-09-30 RX ADMIN — FUROSEMIDE 20 MG: 20 TABLET ORAL at 08:41

## 2019-09-30 RX ADMIN — LISINOPRIL 5 MG: 5 TABLET ORAL at 08:40

## 2019-09-30 RX ADMIN — GABAPENTIN 600 MG: 300 CAPSULE ORAL at 12:02

## 2019-09-30 RX ADMIN — INSULIN LISPRO 2 UNITS: 100 INJECTION, SOLUTION INTRAVENOUS; SUBCUTANEOUS at 20:34

## 2019-09-30 RX ADMIN — ENOXAPARIN SODIUM 40 MG: 40 INJECTION SUBCUTANEOUS at 08:40

## 2019-09-30 RX ADMIN — DESMOPRESSIN ACETATE 40 MG: 0.2 TABLET ORAL at 20:19

## 2019-09-30 ASSESSMENT — PAIN SCALES - GENERAL
PAINLEVEL_OUTOF10: 8
PAINLEVEL_OUTOF10: 4
PAINLEVEL_OUTOF10: 7
PAINLEVEL_OUTOF10: 5
PAINLEVEL_OUTOF10: 6
PAINLEVEL_OUTOF10: 6
PAINLEVEL_OUTOF10: 7
PAINLEVEL_OUTOF10: 4

## 2019-09-30 ASSESSMENT — PAIN DESCRIPTION - LOCATION: LOCATION: LEG

## 2019-09-30 ASSESSMENT — PAIN DESCRIPTION - ORIENTATION: ORIENTATION: RIGHT

## 2019-10-01 VITALS
TEMPERATURE: 97.8 F | SYSTOLIC BLOOD PRESSURE: 151 MMHG | HEIGHT: 65 IN | HEART RATE: 75 BPM | BODY MASS INDEX: 23.73 KG/M2 | RESPIRATION RATE: 16 BRPM | WEIGHT: 142.42 LBS | DIASTOLIC BLOOD PRESSURE: 63 MMHG | OXYGEN SATURATION: 96 %

## 2019-10-01 LAB
GLUCOSE BLD-MCNC: 160 MG/DL (ref 75–110)
GLUCOSE BLD-MCNC: 188 MG/DL (ref 75–110)
GLUCOSE BLD-MCNC: 194 MG/DL (ref 75–110)
SURGICAL PATHOLOGY REPORT: NORMAL

## 2019-10-01 PROCEDURE — 6370000000 HC RX 637 (ALT 250 FOR IP): Performed by: STUDENT IN AN ORGANIZED HEALTH CARE EDUCATION/TRAINING PROGRAM

## 2019-10-01 PROCEDURE — 82947 ASSAY GLUCOSE BLOOD QUANT: CPT

## 2019-10-01 PROCEDURE — 6360000002 HC RX W HCPCS: Performed by: STUDENT IN AN ORGANIZED HEALTH CARE EDUCATION/TRAINING PROGRAM

## 2019-10-01 PROCEDURE — 99024 POSTOP FOLLOW-UP VISIT: CPT | Performed by: SURGERY

## 2019-10-01 PROCEDURE — 2580000003 HC RX 258: Performed by: STUDENT IN AN ORGANIZED HEALTH CARE EDUCATION/TRAINING PROGRAM

## 2019-10-01 RX ORDER — GABAPENTIN 300 MG/1
600 CAPSULE ORAL EVERY 8 HOURS
Qty: 180 CAPSULE | Refills: 0 | Status: SHIPPED | OUTPATIENT
Start: 2019-10-01 | End: 2019-11-08 | Stop reason: SDUPTHER

## 2019-10-01 RX ADMIN — IBUPROFEN 800 MG: 800 TABLET, FILM COATED ORAL at 05:58

## 2019-10-01 RX ADMIN — CLOPIDOGREL 75 MG: 75 TABLET, FILM COATED ORAL at 07:51

## 2019-10-01 RX ADMIN — Medication 10 ML: at 07:51

## 2019-10-01 RX ADMIN — FUROSEMIDE 20 MG: 20 TABLET ORAL at 07:51

## 2019-10-01 RX ADMIN — OXYCODONE HYDROCHLORIDE 10 MG: 5 TABLET ORAL at 05:58

## 2019-10-01 RX ADMIN — ASPIRIN 81 MG: 81 TABLET, CHEWABLE ORAL at 07:51

## 2019-10-01 RX ADMIN — LISINOPRIL 5 MG: 5 TABLET ORAL at 07:51

## 2019-10-01 RX ADMIN — INSULIN LISPRO 3 UNITS: 100 INJECTION, SOLUTION INTRAVENOUS; SUBCUTANEOUS at 12:28

## 2019-10-01 RX ADMIN — OXYCODONE HYDROCHLORIDE 10 MG: 5 TABLET ORAL at 14:26

## 2019-10-01 RX ADMIN — GABAPENTIN 600 MG: 300 CAPSULE ORAL at 05:58

## 2019-10-01 RX ADMIN — MULTIPLE VITAMINS W/ MINERALS TAB 1 TABLET: TAB at 07:51

## 2019-10-01 RX ADMIN — METOPROLOL TARTRATE 25 MG: 25 TABLET ORAL at 07:51

## 2019-10-01 RX ADMIN — GABAPENTIN 600 MG: 300 CAPSULE ORAL at 14:25

## 2019-10-01 RX ADMIN — OXYCODONE HYDROCHLORIDE 10 MG: 5 TABLET ORAL at 10:05

## 2019-10-01 RX ADMIN — ENOXAPARIN SODIUM 40 MG: 40 INJECTION SUBCUTANEOUS at 07:51

## 2019-10-01 RX ADMIN — INSULIN LISPRO 3 UNITS: 100 INJECTION, SOLUTION INTRAVENOUS; SUBCUTANEOUS at 07:54

## 2019-10-01 ASSESSMENT — PAIN DESCRIPTION - PAIN TYPE
TYPE: ACUTE PAIN;SURGICAL PAIN
TYPE: ACUTE PAIN;SURGICAL PAIN

## 2019-10-01 ASSESSMENT — PAIN DESCRIPTION - ORIENTATION
ORIENTATION: RIGHT
ORIENTATION: RIGHT

## 2019-10-01 ASSESSMENT — PAIN SCALES - GENERAL
PAINLEVEL_OUTOF10: 4
PAINLEVEL_OUTOF10: 4
PAINLEVEL_OUTOF10: 7
PAINLEVEL_OUTOF10: 4

## 2019-10-01 ASSESSMENT — PAIN DESCRIPTION - LOCATION
LOCATION: LEG
LOCATION: LEG

## 2019-10-03 DIAGNOSIS — E11.40 TYPE 2 DIABETES MELLITUS WITH DIABETIC NEUROPATHY, WITHOUT LONG-TERM CURRENT USE OF INSULIN (HCC): ICD-10-CM

## 2019-10-03 RX ORDER — LANCETS 30 GAUGE
1 EACH MISCELLANEOUS DAILY
Qty: 100 EACH | Refills: 5 | OUTPATIENT
Start: 2019-10-03

## 2019-10-03 RX ORDER — GLUCOSAMINE HCL/CHONDROITIN SU 500-400 MG
CAPSULE ORAL
Qty: 100 STRIP | Refills: 3 | OUTPATIENT
Start: 2019-10-03

## 2019-10-09 ENCOUNTER — TELEPHONE (OUTPATIENT)
Dept: FAMILY MEDICINE CLINIC | Age: 54
End: 2019-10-09

## 2019-11-04 RX ORDER — TRAZODONE HYDROCHLORIDE 50 MG/1
50 TABLET ORAL NIGHTLY
Qty: 30 TABLET | Refills: 1 | Status: SHIPPED | OUTPATIENT
Start: 2019-11-04 | End: 2020-06-08

## 2019-11-08 ENCOUNTER — OFFICE VISIT (OUTPATIENT)
Dept: VASCULAR SURGERY | Age: 54
End: 2019-11-08

## 2019-11-08 ENCOUNTER — TELEPHONE (OUTPATIENT)
Dept: VASCULAR SURGERY | Age: 54
End: 2019-11-08

## 2019-11-08 VITALS
DIASTOLIC BLOOD PRESSURE: 84 MMHG | WEIGHT: 137 LBS | BODY MASS INDEX: 22.82 KG/M2 | SYSTOLIC BLOOD PRESSURE: 173 MMHG | TEMPERATURE: 97.3 F | HEIGHT: 65 IN

## 2019-11-08 DIAGNOSIS — Z89.611 HX OF AKA (ABOVE KNEE AMPUTATION), RIGHT (HCC): ICD-10-CM

## 2019-11-08 DIAGNOSIS — I73.9 PAD (PERIPHERAL ARTERY DISEASE) (HCC): Primary | ICD-10-CM

## 2019-11-08 PROCEDURE — 99024 POSTOP FOLLOW-UP VISIT: CPT | Performed by: SURGERY

## 2019-11-08 RX ORDER — ATORVASTATIN CALCIUM 40 MG/1
40 TABLET, FILM COATED ORAL NIGHTLY
Qty: 30 TABLET | Refills: 3 | Status: SHIPPED | OUTPATIENT
Start: 2019-11-08 | End: 2020-02-25 | Stop reason: SDUPTHER

## 2019-11-08 RX ORDER — GABAPENTIN 300 MG/1
600 CAPSULE ORAL EVERY 8 HOURS
Qty: 180 CAPSULE | Refills: 0 | Status: SHIPPED | OUTPATIENT
Start: 2019-11-08 | End: 2019-12-07 | Stop reason: SDUPTHER

## 2019-11-08 RX ORDER — GLUCOSAMINE HCL/CHONDROITIN SU 500-400 MG
CAPSULE ORAL
Qty: 100 STRIP | Refills: 3 | Status: SHIPPED | OUTPATIENT
Start: 2019-11-08 | End: 2020-03-05 | Stop reason: SDUPTHER

## 2019-11-08 RX ORDER — BLOOD-GLUCOSE METER
EACH MISCELLANEOUS
Refills: 0 | COMMUNITY
Start: 2019-09-27

## 2019-11-09 ASSESSMENT — ENCOUNTER SYMPTOMS
COLOR CHANGE: 0
SHORTNESS OF BREATH: 0
CHEST TIGHTNESS: 0

## 2019-11-19 ENCOUNTER — TELEPHONE (OUTPATIENT)
Dept: FAMILY MEDICINE CLINIC | Age: 54
End: 2019-11-19

## 2019-12-02 ENCOUNTER — HOSPITAL ENCOUNTER (OUTPATIENT)
Age: 54
Setting detail: SPECIMEN
Discharge: HOME | End: 2019-12-02
Payer: MEDICAID

## 2019-12-02 ENCOUNTER — OFFICE VISIT (OUTPATIENT)
Dept: FAMILY MEDICINE CLINIC | Age: 54
End: 2019-12-02
Payer: MEDICAID

## 2019-12-02 VITALS
SYSTOLIC BLOOD PRESSURE: 153 MMHG | WEIGHT: 143 LBS | HEART RATE: 60 BPM | HEIGHT: 65 IN | TEMPERATURE: 97.7 F | DIASTOLIC BLOOD PRESSURE: 78 MMHG | BODY MASS INDEX: 23.82 KG/M2

## 2019-12-02 DIAGNOSIS — E11.40 TYPE 2 DIABETES MELLITUS WITH DIABETIC NEUROPATHY, WITHOUT LONG-TERM CURRENT USE OF INSULIN (HCC): ICD-10-CM

## 2019-12-02 DIAGNOSIS — I73.9 PAD (PERIPHERAL ARTERY DISEASE) (HCC): ICD-10-CM

## 2019-12-02 DIAGNOSIS — I10 ESSENTIAL HYPERTENSION: ICD-10-CM

## 2019-12-02 DIAGNOSIS — E11.40 TYPE 2 DIABETES MELLITUS WITH DIABETIC NEUROPATHY, WITHOUT LONG-TERM CURRENT USE OF INSULIN (HCC): Primary | ICD-10-CM

## 2019-12-02 DIAGNOSIS — Z12.11 COLON CANCER SCREENING: ICD-10-CM

## 2019-12-02 LAB
CREATININE URINE: 71 MG/DL (ref 39–259)
HBA1C MFR BLD: 6.6 %
MICROALBUMIN/CREAT 24H UR: <12 MG/L
MICROALBUMIN/CREAT UR-RTO: NORMAL MCG/MG CREAT

## 2019-12-02 PROCEDURE — 2022F DILAT RTA XM EVC RTNOPTHY: CPT | Performed by: STUDENT IN AN ORGANIZED HEALTH CARE EDUCATION/TRAINING PROGRAM

## 2019-12-02 PROCEDURE — 3017F COLORECTAL CA SCREEN DOC REV: CPT | Performed by: STUDENT IN AN ORGANIZED HEALTH CARE EDUCATION/TRAINING PROGRAM

## 2019-12-02 PROCEDURE — 83036 HEMOGLOBIN GLYCOSYLATED A1C: CPT | Performed by: STUDENT IN AN ORGANIZED HEALTH CARE EDUCATION/TRAINING PROGRAM

## 2019-12-02 PROCEDURE — 3044F HG A1C LEVEL LT 7.0%: CPT | Performed by: STUDENT IN AN ORGANIZED HEALTH CARE EDUCATION/TRAINING PROGRAM

## 2019-12-02 PROCEDURE — G8482 FLU IMMUNIZE ORDER/ADMIN: HCPCS | Performed by: STUDENT IN AN ORGANIZED HEALTH CARE EDUCATION/TRAINING PROGRAM

## 2019-12-02 PROCEDURE — G8428 CUR MEDS NOT DOCUMENT: HCPCS | Performed by: STUDENT IN AN ORGANIZED HEALTH CARE EDUCATION/TRAINING PROGRAM

## 2019-12-02 PROCEDURE — G8598 ASA/ANTIPLAT THER USED: HCPCS | Performed by: STUDENT IN AN ORGANIZED HEALTH CARE EDUCATION/TRAINING PROGRAM

## 2019-12-02 PROCEDURE — 1036F TOBACCO NON-USER: CPT | Performed by: STUDENT IN AN ORGANIZED HEALTH CARE EDUCATION/TRAINING PROGRAM

## 2019-12-02 PROCEDURE — 99213 OFFICE O/P EST LOW 20 MIN: CPT | Performed by: STUDENT IN AN ORGANIZED HEALTH CARE EDUCATION/TRAINING PROGRAM

## 2019-12-02 PROCEDURE — G8420 CALC BMI NORM PARAMETERS: HCPCS | Performed by: STUDENT IN AN ORGANIZED HEALTH CARE EDUCATION/TRAINING PROGRAM

## 2019-12-02 RX ORDER — CLOPIDOGREL BISULFATE 75 MG/1
75 TABLET ORAL DAILY
Qty: 30 TABLET | Refills: 3 | Status: SHIPPED | OUTPATIENT
Start: 2019-12-02 | End: 2020-03-24

## 2019-12-02 RX ORDER — INSULIN GLARGINE 100 [IU]/ML
INJECTION, SOLUTION SUBCUTANEOUS
Qty: 5 PEN | Refills: 2 | Status: SHIPPED | OUTPATIENT
Start: 2019-12-02 | End: 2020-03-05 | Stop reason: SDUPTHER

## 2019-12-02 RX ORDER — LISINOPRIL 5 MG/1
5 TABLET ORAL DAILY
Qty: 30 TABLET | Refills: 5 | Status: SHIPPED | OUTPATIENT
Start: 2019-12-02 | End: 2020-03-11 | Stop reason: DRUGHIGH

## 2019-12-02 ASSESSMENT — ENCOUNTER SYMPTOMS
SORE THROAT: 0
NAUSEA: 0
EYE PAIN: 0
VOMITING: 0
COUGH: 0
WHEEZING: 0
ABDOMINAL PAIN: 0
SHORTNESS OF BREATH: 0
PHOTOPHOBIA: 0
RHINORRHEA: 0

## 2019-12-09 RX ORDER — GABAPENTIN 300 MG/1
CAPSULE ORAL
Qty: 180 CAPSULE | Refills: 0 | Status: SHIPPED | OUTPATIENT
Start: 2019-12-09 | End: 2019-12-24

## 2019-12-12 ENCOUNTER — TELEPHONE (OUTPATIENT)
Dept: FAMILY MEDICINE CLINIC | Age: 54
End: 2019-12-12

## 2019-12-19 ENCOUNTER — TELEPHONE (OUTPATIENT)
Dept: VASCULAR SURGERY | Age: 54
End: 2019-12-19

## 2019-12-22 DIAGNOSIS — E11.40 TYPE 2 DIABETES MELLITUS WITH DIABETIC NEUROPATHY, WITHOUT LONG-TERM CURRENT USE OF INSULIN (HCC): ICD-10-CM

## 2019-12-24 RX ORDER — GABAPENTIN 300 MG/1
CAPSULE ORAL
Qty: 180 CAPSULE | Refills: 0 | Status: SHIPPED | OUTPATIENT
Start: 2019-12-24 | End: 2020-02-24

## 2020-01-08 NOTE — TELEPHONE ENCOUNTER
E-scribe request for Admelog. Please review and e-scribe if applicable. Next Visit Date:  Visit date not found    Health Maintenance   Topic Date Due    Hepatitis C screen  1965    Diabetic retinal exam  06/08/1974    HIV screen  06/08/1979    Hepatitis B vaccine (1 of 3 - Risk 3-dose series) 06/08/1983    Colon cancer screen colonoscopy  06/08/2014    Shingles Vaccine (2 of 2) 01/03/2020    Lipid screen  04/17/2020    Diabetic foot exam  09/27/2020    Potassium monitoring  09/28/2020    Creatinine monitoring  09/28/2020    A1C test (Diabetic or Prediabetic)  12/02/2020    Diabetic microalbuminuria test  12/02/2020    DTaP/Tdap/Td vaccine (2 - Td) 11/11/2025    Flu vaccine  Completed    Pneumococcal 0-64 years Vaccine  Completed             (applicable per patient's age: Cancer Screenings, Depression Screening, Fall Risk Screening, Immunizations)    Hemoglobin A1C (%)   Date Value   12/02/2019 6.6   08/20/2019 10.0 (H)   07/18/2019 8.4     Microalb/Crt. Ratio (mcg/mg creat)   Date Value   12/02/2019 CANNOT BE CALCULATED     LDL Cholesterol (mg/dL)   Date Value   04/17/2019 22     AST (U/L)   Date Value   05/04/2019 26     ALT (U/L)   Date Value   05/04/2019 43 (H)     BUN (mg/dL)   Date Value   08/30/2019 9      (goal A1C is < 7)   (goal LDL is <100) need 30-50% reduction from baseline     BP Readings from Last 3 Encounters:   12/02/19 (!) 153/78   11/08/19 (!) 173/84   10/01/19 (!) 151/63    (goal /80)      All Future Testing planned in [facility]:  Lab Frequency Next Occurrence   CBC With Auto Differential Once 07/08/2019   Hepatitis C Antibody Once 07/18/2020   HIV Screen Once 07/18/2020   VL ARTERIAL PVR LOWER WO EXERCISE Once 05/08/2020   Cologuard (For External Results Only) Once 10/10/2020       Next Visit Date:  No future appointments.          Patient Active Problem List:     PAD (peripheral artery disease) ([facility]     Subacute osteomyelitis of right tibia [facility])     Metabolic

## 2020-01-27 NOTE — TELEPHONE ENCOUNTER
Metabolic encephalopathy     Abnormal CT of the head     Hepatitis     Acute cerebral infarction associated with systemic hypoxia or ischemia (HCC)     Multi infarct state     Carotid stenosis, bilateral     Right foot drop     Thrombocytosis (HCC)     Acute CVA (cerebrovascular accident) ([address]     Moderate malnutrition ([address]     Cellulitis of right lower extremity     Right-sided extracranial carotid artery stenosis     Essential hypertension     Type 2 diabetes mellitus with diabetic neuropathy, without long-term current use of insulin (HCC)     Leukocytosis     Status post angiography of extremity     Ischemic gangrene ([address]     Pyogenic inflammation of bone ([address]     Coronary artery disease     Above knee amputation of right lower extremity ([address]

## 2020-02-24 RX ORDER — GABAPENTIN 300 MG/1
CAPSULE ORAL
Qty: 180 CAPSULE | Refills: 0 | Status: SHIPPED | OUTPATIENT
Start: 2020-02-24 | End: 2020-02-25 | Stop reason: SDUPTHER

## 2020-02-24 NOTE — TELEPHONE ENCOUNTER
E-scribe request for Gabapentin 300 MG. Please review and e-scribe if applicable. Next Visit Date:  Visit date not found    Health Maintenance   Topic Date Due    Hepatitis C screen  1965    Diabetic retinal exam  06/08/1974    HIV screen  06/08/1979    Hepatitis B vaccine (1 of 3 - Risk 3-dose series) 06/08/1983    Colon cancer screen colonoscopy  06/08/2014    Shingles Vaccine (2 of 2) 01/03/2020    Lipid screen  04/17/2020    Diabetic foot exam  09/27/2020    Potassium monitoring  09/28/2020    Creatinine monitoring  09/28/2020    A1C test (Diabetic or Prediabetic)  12/02/2020    Diabetic microalbuminuria test  12/02/2020    DTaP/Tdap/Td vaccine (2 - Td) 11/11/2025    Flu vaccine  Completed    Pneumococcal 0-64 years Vaccine  Completed    Hepatitis A vaccine  Aged Out    Hib vaccine  Aged Out    Meningococcal (ACWY) vaccine  Aged Out             (applicable per patient's age: Cancer Screenings, Depression Screening, Fall Risk Screening, Immunizations)    Hemoglobin A1C (%)   Date Value   12/02/2019 6.6   08/20/2019 10.0 (H)   07/18/2019 8.4     Microalb/Crt. Ratio (mcg/mg creat)   Date Value   12/02/2019 CANNOT BE CALCULATED     LDL Cholesterol (mg/dL)   Date Value   04/17/2019 22     AST (U/L)   Date Value   05/04/2019 26     ALT (U/L)   Date Value   05/04/2019 43 (H)     BUN (mg/dL)   Date Value   08/30/2019 9      (goal A1C is < 7)   (goal LDL is <100) need 30-50% reduction from baseline     BP Readings from Last 3 Encounters:   12/02/19 (!) 153/78   11/08/19 (!) 173/84   10/01/19 (!) 151/63    (goal /80)      All Future Testing planned in [facility]:  Lab Frequency Next Occurrence   CBC With Auto Differential Once 07/08/2019   Hepatitis C Antibody Once 07/18/2020   HIV Screen Once 07/18/2020   VL ARTERIAL PVR LOWER WO EXERCISE Once 05/08/2020   Cologuard (For External Results Only) Once 10/10/2020       Next Visit Date:  No future appointments.          Patient Active Problem List:     PAD (peripheral artery disease) ([address]     Subacute osteomyelitis of right tibia (HCC)     Metabolic encephalopathy     Abnormal CT of the head     Hepatitis     Acute cerebral infarction associated with systemic hypoxia or ischemia (HCC)     Multi infarct state     Carotid stenosis, bilateral     Right foot drop     Thrombocytosis (HCC)     Acute CVA (cerebrovascular accident) ([address]     Moderate malnutrition ([address]     Cellulitis of right lower extremity     Right-sided extracranial carotid artery stenosis     Essential hypertension     Type 2 diabetes mellitus with diabetic neuropathy, without long-term current use of insulin (HCC)     Leukocytosis     Status post angiography of extremity     Ischemic gangrene ([address]     Pyogenic inflammation of bone (HCC)     Coronary artery disease     Above knee amputation of right lower extremity ([address]

## 2020-02-25 RX ORDER — ATORVASTATIN CALCIUM 40 MG/1
40 TABLET, FILM COATED ORAL NIGHTLY
Qty: 30 TABLET | Refills: 3 | Status: SHIPPED | OUTPATIENT
Start: 2020-02-25 | End: 2020-07-29 | Stop reason: SDUPTHER

## 2020-02-25 RX ORDER — GABAPENTIN 300 MG/1
CAPSULE ORAL
Qty: 180 CAPSULE | Refills: 0 | Status: SHIPPED | OUTPATIENT
Start: 2020-02-25 | End: 2020-03-24 | Stop reason: SDUPTHER

## 2020-02-25 NOTE — TELEPHONE ENCOUNTER
(peripheral artery disease) (HCC)     Subacute osteomyelitis of right tibia (HCC)     Metabolic encephalopathy     Abnormal CT of the head     Hepatitis     Acute cerebral infarction associated with systemic hypoxia or ischemia (HCC)     Multi infarct state     Carotid stenosis, bilateral     Right foot drop     Thrombocytosis (HCC)     Acute CVA (cerebrovascular accident) ([address]     Moderate malnutrition ([address]     Cellulitis of right lower extremity     Right-sided extracranial carotid artery stenosis     Essential hypertension     Type 2 diabetes mellitus with diabetic neuropathy, without long-term current use of insulin (HCC)     Leukocytosis     Status post angiography of extremity     Ischemic gangrene ([address]     Pyogenic inflammation of bone ([address]     Coronary artery disease     Above knee amputation of right lower extremity ([address]

## 2020-03-05 RX ORDER — INSULIN GLARGINE 100 [IU]/ML
INJECTION, SOLUTION SUBCUTANEOUS
Qty: 5 PEN | Refills: 2 | Status: SHIPPED | OUTPATIENT
Start: 2020-03-05 | End: 2020-07-29 | Stop reason: SDUPTHER

## 2020-03-05 RX ORDER — GLUCOSAMINE HCL/CHONDROITIN SU 500-400 MG
CAPSULE ORAL
Qty: 100 STRIP | Refills: 3 | Status: SHIPPED | OUTPATIENT
Start: 2020-03-05 | End: 2020-12-10 | Stop reason: SDUPTHER

## 2020-03-11 ENCOUNTER — OFFICE VISIT (OUTPATIENT)
Dept: FAMILY MEDICINE CLINIC | Age: 55
End: 2020-03-11
Payer: MEDICAID

## 2020-03-11 VITALS
WEIGHT: 170 LBS | DIASTOLIC BLOOD PRESSURE: 77 MMHG | BODY MASS INDEX: 28.32 KG/M2 | HEART RATE: 70 BPM | SYSTOLIC BLOOD PRESSURE: 184 MMHG | TEMPERATURE: 97.4 F | HEIGHT: 65 IN

## 2020-03-11 PROCEDURE — 99213 OFFICE O/P EST LOW 20 MIN: CPT | Performed by: STUDENT IN AN ORGANIZED HEALTH CARE EDUCATION/TRAINING PROGRAM

## 2020-03-11 RX ORDER — LISINOPRIL 20 MG/1
20 TABLET ORAL DAILY
Qty: 90 TABLET | Refills: 1 | Status: SHIPPED | OUTPATIENT
Start: 2020-03-11 | End: 2020-07-29 | Stop reason: SDUPTHER

## 2020-03-11 ASSESSMENT — ENCOUNTER SYMPTOMS
SHORTNESS OF BREATH: 0
WHEEZING: 0
PHOTOPHOBIA: 0
COUGH: 0
NAUSEA: 0
VOMITING: 0
EYE PAIN: 0
ABDOMINAL PAIN: 0
SORE THROAT: 0
RHINORRHEA: 0

## 2020-03-11 ASSESSMENT — PATIENT HEALTH QUESTIONNAIRE - PHQ9
SUM OF ALL RESPONSES TO PHQ9 QUESTIONS 1 & 2: 0
SUM OF ALL RESPONSES TO PHQ QUESTIONS 1-9: 0
2. FEELING DOWN, DEPRESSED OR HOPELESS: 0
SUM OF ALL RESPONSES TO PHQ QUESTIONS 1-9: 0
1. LITTLE INTEREST OR PLEASURE IN DOING THINGS: 0

## 2020-03-11 NOTE — PROGRESS NOTES
Visit Information    Have you changed or started any medications since your last visit including any over-the-counter medicines, vitamins, or herbal medicines? no   Have you stopped taking any of your medications? Is so, why? -  no  Are you having any side effects from any of your medications? - no    Have you seen any other physician or provider since your last visit?  no   Have you had any other diagnostic tests since your last visit?  no   Have you been seen in the emergency room and/or had an admission in a hospital since we last saw you?  no   Have you had your routine dental cleaning in the past 6 months?  no     Do you have an active MyChart account? If no, what is the barrier?   Yes    Patient Care Team:  James Arevalo MD as PCP - General (Family Medicine)    Medical History Review  Past Medical, Family, and Social History reviewed and does not contribute to the patient presenting condition    Health Maintenance   Topic Date Due    Hepatitis C screen  1965    Diabetic retinal exam  06/08/1974    HIV screen  06/08/1979    Hepatitis B vaccine (1 of 3 - Risk 3-dose series) 06/08/1983    Colon cancer screen colonoscopy  06/08/2014    Shingles Vaccine (2 of 2) 01/03/2020    Lipid screen  04/17/2020    Diabetic foot exam  09/27/2020    Potassium monitoring  09/28/2020    Creatinine monitoring  09/28/2020    A1C test (Diabetic or Prediabetic)  12/02/2020    Diabetic microalbuminuria test  12/02/2020    DTaP/Tdap/Td vaccine (2 - Td) 11/11/2025    Flu vaccine  Completed    Pneumococcal 0-64 years Vaccine  Completed    Hepatitis A vaccine  Aged Out    Hib vaccine  Aged Out    Meningococcal (ACWY) vaccine  Aged Out

## 2020-03-11 NOTE — PROGRESS NOTES
I have reviewed and discussed key elements of [address] [place] [address] with the resident including plan of care and follow up and agree with the care bora plan.

## 2020-03-12 ENCOUNTER — TELEPHONE (OUTPATIENT)
Dept: FAMILY MEDICINE CLINIC | Age: 55
End: 2020-03-12

## 2020-03-18 NOTE — TELEPHONE ENCOUNTER
Patient called and states he wants to go to [facility] [place] [facility] - [facility]'s so I called insurance to start referral process to get approval case number [id number] will call and inform patient. Will chyna a few days per ins.

## 2020-03-23 NOTE — TELEPHONE ENCOUNTER
E-scribe request for plavix. Please review and e-scribe if applicable. Last Visit Date:  3-11-20  Next Visit Date:  Visit date not found    Hemoglobin A1C (%)   Date Value   12/02/2019 6.6   08/20/2019 10.0 (H)   07/18/2019 8.4             ( goal A1C is < 7)   Microalb/Crt.  Ratio (mcg/mg creat)   Date Value   12/02/2019 CANNOT BE CALCULATED     LDL Cholesterol (mg/dL)   Date Value   04/17/2019 22       (goal LDL is <100)   AST (U/L)   Date Value   05/04/2019 26     ALT (U/L)   Date Value   05/04/2019 43 (H)     BUN (mg/dL)   Date Value   08/30/2019 9     BP Readings from Last 3 Encounters:   03/11/20 (!) 184/77   12/02/19 (!) 153/78   11/08/19 (!) 173/84          (goal 120/80)        Patient Active Problem List:     PAD (peripheral artery disease) ([address]     Subacute osteomyelitis of right tibia (HCC)     Metabolic encephalopathy     Abnormal CT of the head     Hepatitis     Acute cerebral infarction associated with systemic hypoxia or ischemia (HCC)     Multi infarct state     Carotid stenosis, bilateral     Right foot drop     Thrombocytosis (HCC)     Acute CVA (cerebrovascular accident) ([address]     Moderate malnutrition ([address]     Cellulitis of right lower extremity     Right-sided extracranial carotid artery stenosis     Essential hypertension     Type 2 diabetes mellitus with diabetic neuropathy, without long-term current use of insulin (HCC)     Leukocytosis     Status post angiography of extremity     Ischemic gangrene ([address]     Pyogenic inflammation of bone ([address]     Coronary artery disease     Above knee amputation of right lower extremity ([address]      ----Dakota Back

## 2020-03-24 RX ORDER — CLOPIDOGREL BISULFATE 75 MG/1
TABLET ORAL
Qty: 30 TABLET | Refills: 3 | Status: SHIPPED | OUTPATIENT
Start: 2020-03-24 | End: 2020-04-01 | Stop reason: SDUPTHER

## 2020-03-24 RX ORDER — GABAPENTIN 300 MG/1
CAPSULE ORAL
Qty: 180 CAPSULE | Refills: 0 | Status: SHIPPED | OUTPATIENT
Start: 2020-03-24 | End: 2020-05-20 | Stop reason: SDUPTHER

## 2020-04-01 RX ORDER — CLOPIDOGREL BISULFATE 75 MG/1
TABLET ORAL
Qty: 30 TABLET | Refills: 3 | Status: SHIPPED | OUTPATIENT
Start: 2020-04-01 | End: 2020-07-13 | Stop reason: SDUPTHER

## 2020-04-01 NOTE — TELEPHONE ENCOUNTER
right tibia [facility])     Metabolic encephalopathy     Abnormal CT of the head     Hepatitis     Acute cerebral infarction associated with systemic hypoxia or ischemia (HCC)     Multi infarct state     Carotid stenosis, bilateral     Right foot drop     Thrombocytosis (HCC)     Acute CVA (cerebrovascular accident) ([address]     Moderate malnutrition ([address]     Cellulitis of right lower extremity     Right-sided extracranial carotid artery stenosis     Essential hypertension     Type 2 diabetes mellitus with diabetic neuropathy, without long-term current use of insulin (HCC)     Leukocytosis     Status post angiography of extremity     Ischemic gangrene ([address]     Pyogenic inflammation of bone ([address]     Coronary artery disease     Above knee amputation of right lower extremity ([address]

## 2020-04-07 ENCOUNTER — HOSPITAL ENCOUNTER (OUTPATIENT)
Dept: PHYSICAL THERAPY | Age: 55
Setting detail: THERAPIES SERIES
Discharge: HOME | End: 2020-04-07
Payer: MEDICAID

## 2020-04-14 ENCOUNTER — VIRTUAL VISIT (OUTPATIENT)
Dept: FAMILY MEDICINE CLINIC | Age: 55
End: 2020-04-14
Payer: MEDICAID

## 2020-04-14 ENCOUNTER — HOSPITAL ENCOUNTER (OUTPATIENT)
Dept: PHYSICAL THERAPY | Age: 55
Setting detail: THERAPIES SERIES
Discharge: HOME | End: 2020-04-14
Payer: MEDICAID

## 2020-04-14 PROCEDURE — 97161 PT EVAL LOW COMPLEX 20 MIN: CPT

## 2020-04-14 PROCEDURE — 99441 PR PHYS/QHP TELEPHONE EVALUATION 5-10 MIN: CPT | Performed by: FAMILY MEDICINE

## 2020-04-14 RX ORDER — METOPROLOL TARTRATE 50 MG/1
50 TABLET, FILM COATED ORAL 2 TIMES DAILY
Qty: 180 TABLET | Refills: 0 | Status: SHIPPED | OUTPATIENT
Start: 2020-04-14 | End: 2020-07-29 | Stop reason: SDUPTHER

## 2020-04-14 NOTE — CONSULTS
[facility]  [address] []  Medication allergies reviewed for use of   Dexamethasone Sodium Phosphate 4mg/ml with iontophoresis treatments. Pt is not allergic. Frequency:  2 x/week for 18 visits    Todays Treatment:  Precautions:  Modalities:   Exercises:  Exercise Reps/ Time Weight/ Level Comments   4 way SLR   HEP given and verbal instruction   Other:    Specific Instructions for next treatment[de-identified] R hip strengthening. Gait and balance training, Wean off RW when appropriate.        Evaluation Complexity:  History (Personal factors, comorbidities) [] 0 [x] 1-2 [] 3+   Exam (limitations, restrictions) [] 1-2 [x] 3 [] 4+   Clinical presentation (progression) [x] Stable [] Evolving  [] Unstable   Decision Making [x] Low [] Moderate [] High    [x] Low Complexity [] Moderate Complexity [] High Complexity       Treatment Charges: Mins Units   [x] Evaluation       [x]  Low       []  Moderate       []  High 30 1   []  Modalities     [x]  Ther Exercise     []  Manual Therapy     []  Ther Activities     []  Aquatics     []  Vasocompression     []  Other       TOTAL TREATMENT TIME: 0      Time in:1200 pm     Time out:1230 pm    Electronically signed by: Toni Yost, PT

## 2020-04-14 NOTE — PROGRESS NOTES
Martinez Rosario is a 53 y.o. male evaluated via telephone on 4/14/2020. Consent:  He and/or health care decision maker is aware that that he may receive a bill for this telephone service, depending on his insurance coverage, and has provided verbal consent to proceed: Yes      Documentation:  I communicated with the patient and/or health care decision maker about HTN. Details of this discussion including any medical advice provided:     Patient was seen in our office one month ago. Blood pressure was elevated and lisinopril was increased from 10 mg daily to 20 mg. He continued metoprolol 25 mg BID. Is calling with his home BP reading. States a little lower now - upper number 160's. Was 180's. He feels very well. No SOB. No CP. Energy is fine. No edema. No head aches or visual changes. I discussed options for either increasing one of his medicatins or adding a third BP med. He prefers to increase a current med. Metoprolol increased to 50 mg BID. He will call our office/me in four days with new BP readings. Careful review of urgent symptoms requiring immediate medical attention. Patient expressed understanding. Patient was able to \"teach back\" to me the points of our discussion. I affirm this is a Patient Initiated Episode with an Established Patient who has not had a related appointment within my department in the past 7 days or scheduled within the next 24 hours.     Total Time: minutes: 5-10 minutes    Note: not billable if this call serves to triage the patient into an appointment for the relevant concern      Fior Aguilar

## 2020-04-17 NOTE — PRE-CERTIFICATION NOTE
[] [address]  [address]  [phone]  F: [phone] [] [address]  [facility]   [address]  P: [phone]  F: [phone] [] [place]  [address]  P: [phone]  F: [phone] [] [address]  [facility]   [address]   [place]: [phone]  F: [phone]  [x] Katelyn Roberto   Outpatient Occupational Therapy  [address]: [phone]  F: [phone]          Therapy Pre-certification Note      4/17/2020    Belkys Lorenzo  1965   [id number]      Insurance approval was received for Physical Therapy from [facility] on 4/17/20. Approval was received for 72 visits, from 4/17/20 to 10/15/20. Authorization number [id number].    79365, 72 units  37949, 72 units  48423, 72 units  22495, 72 units  00549, 72 units    72 units = 18 visits of 2x/week per PT POC. Patient was contacted to be scheduled and message was left.       Electronically signed by Graciela Shen PT on 4/17/2020 at 9:13 AM

## 2020-04-21 ENCOUNTER — HOSPITAL ENCOUNTER (OUTPATIENT)
Dept: PHYSICAL THERAPY | Age: 55
Setting detail: THERAPIES SERIES
Discharge: HOME | End: 2020-04-21
Payer: MEDICAID

## 2020-04-21 PROCEDURE — 97110 THERAPEUTIC EXERCISES: CPT

## 2020-04-21 PROCEDURE — 97116 GAIT TRAINING THERAPY: CPT

## 2020-04-21 NOTE — FLOWSHEET NOTE
15x  Fwd, lateral , retro   squats 10x2     Lateral stepping 6 ft x 4     Retro stepping. 6 ft x4              gait x  See below         Other: adjusted from leg of walker up one level due to walker pitching fwd. Education to address HEP with and without prosthesis donned. Required two sitting rest breaks with standing exercise. Gait: Tactile cues and verbal cues to prevent R posterior pelvis/torso rotation and promote increased wt bearing/shifting thru anterior  foot to increase knee flexion thru prosthesis  And along with vc to promote increased hip flexion during swing phase. Treatment Charges: Mins Units   []  Modalities     [x]  Ther Exercise 44 3   []  Manual Therapy     []  Ther Activities     []  Aquatics     []  Vasocompression     [x]  Other gait  Neuro re-ed/balance  8   5 1  0   Total Treatment time 57 4       Assessment: [x] Progressing toward goals progress progressed standing hip exercises and added squats for hip/ gluteal strengthening. Education on engaging abdominal muscles along with standing exercises. Added wt shifting exercises to promote stability, but pt unable to address with single UE support at this time. Gait recommendations initiated but difficult for pt to address due to absence of neuro  Feedback. [] No change. [] Other:  [x] Patient would continue to benefit from skilled physical therapy services in order to: progressed hip and gluteal  Strength. Progressed static and dynamic standing balance and progress to cane the no device when appropriate. STG: (to be met in 10 treatments)  1. ? ROM:R hip extension to 20 degrees to improve gait. 2. ? Strength: 5/5 R hip abduction and extension to improve R LE stability. 3. ? Function: LEFS score to 70% functional or better to improve ADLs. 4. Independent with Home Exercise Programs     LTG: (to be met in 18 treatments)  1. Patient will be able to ambulate community distances with straight cane.    2. Patient is able to demonstrate fair standing balance without RW.      Patient goals: improve ability to ambulate, stay independent.           Pt. Education:  [x] Yes  [] No  [x] Reviewed Prior HEP/Ed  Method of Education: [x] Verbal  [x] Demo  [] Written  Comprehension of Education:  [x] Verbalizes understanding. [x] Demonstrates understanding. [] Needs review. [x] Demonstrates/verbalizes HEP/Ed previously given. Plan: [x] Continue current frequency toward long and short term goals. [x] Specific Instructions for subsequent treatments: progress gluteal, and hip strength and ROM.     Gait and balance training, Wean off RW when appropriate.           Time In: 1101           Time Out: 1206     Electronically signed by:  Brianna Franks PTA

## 2020-04-28 ENCOUNTER — HOSPITAL ENCOUNTER (OUTPATIENT)
Dept: PHYSICAL THERAPY | Age: 55
Setting detail: THERAPIES SERIES
Discharge: HOME | End: 2020-04-28
Payer: MEDICAID

## 2020-04-28 PROCEDURE — 97110 THERAPEUTIC EXERCISES: CPT

## 2020-04-28 PROCEDURE — 97116 GAIT TRAINING THERAPY: CPT

## 2020-04-28 NOTE — FLOWSHEET NOTE
[x] [facility]  [address]  [phone]  F: [phone] [] [address]  [facility]   [address]  P: [phone]  F: [phone] [] [place]  [address]  P: [phone]  F: [phone] [] [address]  [facility]   [address]   [place]: [phone]  F: [phone]      Physical Therapy Daily Treatment Note    Date:  2020  Patient Name:  Danielle Dawkins    :  1965    MRN: [id number]    Physician: Vilma Salgado MD      Insurance: [facility]  Approval was received for 72 visits, from 20 to 10/15/20. Authorization number [id number].  49635, 72 units  44436, 72 units  84957, 72 units  73992, 72 units  31381, 72 units    Medical Diagnosis: Amputee of extremity Z89.9                   Rehab Codes: R26.2, R53.1, Z74.09  Onset Date:  20                                Next 's appt: Prosthetist  20  Medical Diagnosis: Amputee of extremity Z89.9                     Visit# / total visits: 3/18 ; Progress note for Medicare patient due at visit 10    Cancels/No Shows: 0/0    Subjective:    Pain:  [x] Yes  [] No Location:  R LE  Pain Rating: (0-10 scale) 1-2/10 \"nerve pain\"  Pain altered Tx:  [x] No  [] Yes  Action:  Comments: Pt reported that he is feeling some soreness after 6-7 hours of donning extremity. Pt with little pain. Pt noted slight redness on top of femur due to lifting but no open wound or rash. Objective:  Modalities: as needed.    Precautions:  Exercises:  Exercise Reps/ Time Weight/ Level Comments         sitting      iso hip add 10x5\"            Supine      Gluteal sets 10x5\"     SLR 10x1     Bridging with bolster 10x2     SL hip abd 10x1     Prone hip ext  10x1           Standing // bars   kait UE support   R 4 way hip SLR w/ iso abdom 10x     L 4 way hip SLR w/ iso abdom  5x     Wt shifting. 15x  Fwd, lateral , retro   squats 10x2     Lateral stepping 6 ft x 4     Retro stepping. 6 ft x4              gait x  See below         Other:     Gait: Tactile cues and verbal cues to promote increased wt bearing/shifting thru anterior foot to increase knee flexion thru prosthesis   Vc to promote increased hip flexion during swing phase of involved extremity. Treatment Charges: Mins Units   []  Modalities     [x]  Ther Exercise 29 2   []  Manual Therapy     []  Ther Activities     []  Aquatics     []  Vasocompression     [x]  Other gait  Neuro re-ed/balance  8   5 1  0   Total Treatment time 42 4       Assessment: [x] Progressing toward goals     [] No change. [] Other: Pt accomplished all activities as listed above with fatigue noted during activities but no increase in pain. Pt noted redness on LE and pt educated on proper skin awareness to decrease risk of pressure injuries. Pt took breaks as needed during standing activities due to fatigue. Pt performed Gait training and was able to perform with decreased rotation this date. Pt declined modalities. [x] Patient would continue to benefit from skilled physical therapy services in order to: progressed hip and gluteal  Strength. Progressed static and dynamic standing balance and progress to cane the no device when appropriate. STG: (to be met in 10 treatments)  1. ? ROM:R hip extension to 20 degrees to improve gait. 2. ? Strength: 5/5 R hip abduction and extension to improve R LE stability. 3. ? Function: LEFS score to 70% functional or better to improve ADLs. 4. Independent with Home Exercise Programs     LTG: (to be met in 18 treatments)  1. Patient will be able to ambulate community distances with straight cane. 2. Patient is able to demonstrate fair standing balance without RW.      Patient goals: improve ability to ambulate, stay independent.           Pt.

## 2020-05-01 ENCOUNTER — HOSPITAL ENCOUNTER (OUTPATIENT)
Dept: PHYSICAL THERAPY | Age: 55
Setting detail: THERAPIES SERIES
Discharge: HOME | End: 2020-05-01
Payer: MEDICAID

## 2020-05-01 PROCEDURE — 97116 GAIT TRAINING THERAPY: CPT

## 2020-05-01 PROCEDURE — 97110 THERAPEUTIC EXERCISES: CPT

## 2020-05-01 PROCEDURE — 97112 NEUROMUSCULAR REEDUCATION: CPT

## 2020-05-01 NOTE — FLOWSHEET NOTE
his hip and said he has spoken to prosthetist about it and prothetist is not overly worried about it. Treatment Charges: Mins Units   []  Modalities     [x]  Ther Exercise 15 1   []  Manual Therapy     []  Ther Activities     []  Aquatics     []  Vasocompression     [x]  Other gait  Neuro re-ed/balance    8   16 1  1   Total Treatment time 39 3       Assessment: [x] Progressing toward goals-slight assist needed with leg lifts in all positions due to weight of prosthetic. Unable to tolerate increase reps of side lying abduction of prone extension, was limited by pinching from the prosthetic in these positions    [] No change. [] Other:   [x] Patient would continue to benefit from skilled physical therapy services in order to: improve hip and gluteal strength and progress static and dynamic standing balance and progress from cane the no device when appropriate. STG: (to be met in 10 treatments)  1. ? ROM:R hip extension to 20 degrees to improve gait. 2. ? Strength: 5/5 R hip abduction and extension to improve R LE stability. 3. ? Function: LEFS score to 70% functional or better to improve ADLs. 4. Independent with Home Exercise Programs     LTG: (to be met in 18 treatments)  1. Patient will be able to ambulate community distances with straight cane. 2. Patient is able to demonstrate fair standing balance without RW.      Patient goals: improve ability to ambulate, stay independent.           Pt. Education:  [x] Yes  [] No  [x] Reviewed Prior HEP/Ed  Method of Education: [x] Verbal  [x] Demo  [] Written  Comprehension of Education:  [x] Verbalizes understanding. [x] Demonstrates understanding. [] Needs review. [x] Demonstrates/verbalizes HEP/Ed previously given. Plan: [x] Continue current frequency toward long and short term goals.   Pt cancelled appt for [facility] due to having a  to go to  [x] Specific Instructions for subsequent treatments: progress gluteal, and hip strength and

## 2020-05-05 ENCOUNTER — APPOINTMENT (OUTPATIENT)
Dept: PHYSICAL THERAPY | Age: 55
End: 2020-05-05
Payer: MEDICAID

## 2020-05-08 ENCOUNTER — HOSPITAL ENCOUNTER (OUTPATIENT)
Dept: PHYSICAL THERAPY | Age: 55
Setting detail: THERAPIES SERIES
Discharge: HOME | End: 2020-05-08
Payer: MEDICAID

## 2020-05-08 PROCEDURE — 97110 THERAPEUTIC EXERCISES: CPT

## 2020-05-08 PROCEDURE — 97116 GAIT TRAINING THERAPY: CPT

## 2020-05-12 ENCOUNTER — TELEPHONE (OUTPATIENT)
Dept: VASCULAR SURGERY | Age: 55
End: 2020-05-12

## 2020-05-13 ENCOUNTER — HOSPITAL ENCOUNTER (OUTPATIENT)
Dept: PHYSICAL THERAPY | Age: 55
Setting detail: THERAPIES SERIES
Discharge: HOME | End: 2020-05-13
Payer: MEDICAID

## 2020-05-13 PROCEDURE — 97110 THERAPEUTIC EXERCISES: CPT

## 2020-05-13 NOTE — FLOWSHEET NOTE
independent.           Pt. Education:  [x] Yes  [] No  [x] Reviewed Prior HEP/Ed  Method of Education: [x] Verbal  [x] Demo  [] Written  Comprehension of Education:  [x] Verbalizes understanding. [x] Demonstrates understanding. [] Needs review. [x] Demonstrates/verbalizes HEP/Ed previously given. Plan: [x] Continue current frequency toward long and short term goals. [x] Specific Instructions for subsequent treatments: progress gluteal, and hip strength and ROM.     Gait and balance training, Wean off RW when appropriate.           Time In:  1346         Time Out:  1039 [facility]    Electronically signed by:  Tonya Moody PTA

## 2020-05-15 ENCOUNTER — HOSPITAL ENCOUNTER (OUTPATIENT)
Dept: PHYSICAL THERAPY | Age: 55
Setting detail: THERAPIES SERIES
Discharge: HOME | End: 2020-05-15
Payer: MEDICAID

## 2020-05-15 PROCEDURE — 97110 THERAPEUTIC EXERCISES: CPT

## 2020-05-15 PROCEDURE — 97112 NEUROMUSCULAR REEDUCATION: CPT

## 2020-05-15 NOTE — FLOWSHEET NOTE
flexion    Mini lunges 15x1  B, Fwd, lateral    hip hikes 2 \" block 15x1  B LE, Progressed reps 5/15   squats 15x2  Progressed reps 5/15    static March  15x2  Progressed reps 5/15   Lateral stepping 6 ft x6     Retro stepping. 6 ft x6  Focusing on reciprocal gait   Dynamic march 6 ft x 6              gait   100 ft x 1  See below         Other:     Gait: Cont to note right foot pivoting outward with posterior pelvis/torso rotation during weightbearing phase of each step. Prosthetist is aware of and is not overly worried about it. Treatment Charges: Mins Units   []  Modalities     [x]  Ther Exercise  45 3   []  Manual Therapy     []  Ther Activities     []  Aquatics     []  Vasocompression     [x]  Other   Neuro re-ed/balance    11   1   Total Treatment time  56 min        Assessment: [x] Progressing toward goals. Progressed reps most standing ex, Pt requires 2 seated rest breaks throughout standing ex. Pt demonstrates improvement w/hip hike. [x] No change. Cont to note right foot pivoting outward with posterior pelvis/torso rotation during weightbearing phase of each step. Prosthetist is aware of and is not overly worried about it. [] Other:   [x] Patient would continue to benefit from skilled physical therapy services in order to: improve hip and gluteal strength and progress static and dynamic standing balance and progress to cane then no device when appropriate. STG: (to be met in 10 treatments)  1. ? ROM:R hip extension to 20 degrees to improve gait. 2. ? Strength: 5/5 R hip abduction and extension to improve R LE stability. 3. ? Function: LEFS score to 70% functional or better to improve ADLs. 4. Independent with Home Exercise Programs     LTG: (to be met in 18 treatments)  1. Patient will be able to ambulate community distances with straight cane.    2. Patient is able to demonstrate fair standing balance without RW.      Patient goals: improve ability to ambulate, stay

## 2020-05-20 RX ORDER — GABAPENTIN 300 MG/1
CAPSULE ORAL
Qty: 180 CAPSULE | Refills: 0 | Status: SHIPPED | OUTPATIENT
Start: 2020-05-20 | End: 2020-11-16

## 2020-05-20 NOTE — TELEPHONE ENCOUNTER
[place] (For External Results Only) Once 10/10/2020       Next Visit Date:  Future Appointments   Date Time Provider Katelyn Cayla   5/20/2020 10:00 AM Brynn Guo, PTA [facility]   5/22/2020 10:00 AM Peg Monzon, PT [facility]   6/1/2020 10:00 AM [facility]   6/8/2020  1:00 PM Leyda Lentz MD [id number]         Patient Active Problem List:     PAD (peripheral artery disease) ([address]     Subacute osteomyelitis of right tibia ([address]     Metabolic encephalopathy     Abnormal CT of the head     Hepatitis     Acute cerebral infarction associated with systemic hypoxia or ischemia (HCC)     Multi infarct state     Carotid stenosis, bilateral     Right foot drop     Thrombocytosis (HCC)     Acute CVA (cerebrovascular accident) ([address]     Moderate malnutrition ([address]     Cellulitis of right lower extremity     Right-sided extracranial carotid artery stenosis     Essential hypertension     Type 2 diabetes mellitus with diabetic neuropathy, without long-term current use of insulin (HCC)     Leukocytosis     Status post angiography of extremity     Ischemic gangrene ([address]     Pyogenic inflammation of bone (HCC)     Coronary artery disease     Above knee amputation of right lower extremity ([address]

## 2020-05-22 ENCOUNTER — HOSPITAL ENCOUNTER (OUTPATIENT)
Dept: PHYSICAL THERAPY | Age: 55
Setting detail: THERAPIES SERIES
Discharge: HOME | End: 2020-05-22
Payer: MEDICAID

## 2020-05-22 PROCEDURE — 97110 THERAPEUTIC EXERCISES: CPT

## 2020-05-22 PROCEDURE — 97112 NEUROMUSCULAR REEDUCATION: CPT

## 2020-05-22 NOTE — FLOWSHEET NOTE
continue to progress this standing functional mobility tolerance for improved gait distances. [] No change. [] Other:   [x] Patient would continue to benefit from skilled physical therapy services in order to: improve hip and gluteal strength and progress static and dynamic standing balance and progress to cane then no device when appropriate. STG: (to be met in 10 treatments)  1. ? ROM:R hip extension to 20 degrees to improve gait. 2. ? Strength: 5/5 R hip abduction and extension to improve R LE stability. 3. ? Function: LEFS score to 70% functional or better to improve ADLs. 4. Independent with Home Exercise Programs     LTG: (to be met in 18 treatments)  1. Patient will be able to ambulate community distances with straight cane. 2. Patient is able to demonstrate fair standing balance without RW.      Patient goals: improve ability to ambulate, stay independent.           Pt. Education:  [x] Yes  [] No  [x] Reviewed Prior HEP/Ed  Method of Education: [x] Verbal  [x] Demo  [] Written  Comprehension of Education:  [x] Verbalizes understanding. [x] Demonstrates understanding. [] Needs review. [x] Demonstrates/verbalizes HEP/Ed previously given. Plan: [x] Continue current frequency toward long and short term goals.     [x] Specific Instructions for subsequent treatments: gait train using cane in LUE with w/c follow for balance; progress to holding items (with progressing weights) while ambulating and longer distances.           Time In:  1008         Time Out:  1103    Electronically signed by:  Chris Montoya, PT

## 2020-05-29 ENCOUNTER — HOSPITAL ENCOUNTER (OUTPATIENT)
Dept: PHYSICAL THERAPY | Age: 55
Setting detail: THERAPIES SERIES
Discharge: HOME | End: 2020-05-29
Payer: MEDICAID

## 2020-05-29 PROCEDURE — 97110 THERAPEUTIC EXERCISES: CPT

## 2020-05-29 PROCEDURE — 97116 GAIT TRAINING THERAPY: CPT

## 2020-06-01 ENCOUNTER — HOSPITAL ENCOUNTER (OUTPATIENT)
Dept: VASCULAR LAB | Age: 55
Discharge: HOME | End: 2020-06-01
Payer: MEDICAID

## 2020-06-01 PROCEDURE — 93923 UPR/LXTR ART STDY 3+ LVLS: CPT

## 2020-06-03 ENCOUNTER — HOSPITAL ENCOUNTER (OUTPATIENT)
Dept: PHYSICAL THERAPY | Age: 55
Setting detail: THERAPIES SERIES
Discharge: HOME | End: 2020-06-03
Payer: MEDICAID

## 2020-06-03 PROCEDURE — 97112 NEUROMUSCULAR REEDUCATION: CPT

## 2020-06-03 PROCEDURE — 97116 GAIT TRAINING THERAPY: CPT

## 2020-06-03 PROCEDURE — 97110 THERAPEUTIC EXERCISES: CPT

## 2020-06-03 NOTE — FLOWSHEET NOTE
[x] [facility]  [address]  [phone]  F: [phone]        Physical Therapy Daily Treatment Note    Date:  6/3/2020  Patient Name:  Rebecca Keller    :  1965   MRN: [id number]  Physician: Augusto Lamb MD      Insurance: [facility]  Approval was received for 72 visits, from 20 to 10/15/20. Authorization number [id number].  03909, 72 units  54700, 72 units  50625, 72 units  92434, 72 units  63101, 72 units  Medical Diagnosis: Amputee of extremity Z89.9                   Rehab Codes: R26.2, R53.1, Z74.09  Onset Date:  2019                                  Next 's appt: Prosthetist  6/15/20  Medical Diagnosis: Amputee of extremity Z89.9                   Visit# / total visits: 10/18 ; Progress note for Medicare patient due at visit 10    Cancels/No Shows: 0/0    Subjective:  Reports he adjusts his sock sleeves depending on the shape of his stump with AM and  PM changes. Addressing HEP   Pain:  [x] Yes  [] No Location:  R LE  Pain Rating: (0-10 scale) 0/10  Pain altered Tx:  [x] No  [] Yes  Action:  Comments:       Objective:  Modalities: as needed. Precautions:  Exercises:  Completed exercises marked with X  Exercise  Right AKA Reps/ Time Weight/ Level  Comments          sitting       iso hip add 15x5\"  x            Supine        SLR 15x AAROM x 2 finger assist   Bridging 10x2  x    SL hip abd 15x2   B, add weight L next Rx   Gluteal sets 10x      Prone hip ext 10x1      Prone hip ext  10x2    prosthetic knee bent. Hip hikes  15x   B, supine, kait feet touching wall,Progressed reps 5/15   Standing // bars    kait UE support   R 4 way hip SLR w/ iso abdom 15x ea  x     L 4 way hip SLR w/ iso abdom  15x       Wt shifting.       Fwd, lateral , retro   Mini lunges 15x1  x B, Fwd, lateral    Side stepping over therabar 10x1  x    hip hikes 2 \" block    B LE, Progressed reps 5/15   Squats 15x2       static March  15x2 Gait training    Completed at inpatient rail today   FWD gait with rail  5 min   x    Side stepping  5 min   x     Retro stepping. Focusing on reciprocal gait   Dynamic march        Gait with quad cane 5 min   x Emphasis on pattern, sing, and ground clearance. Gait with quad cane plus carry 5 min   x Carrying small blue ball in R hand          Stair climbing 3x2  x kait HR non reciprocal, added 6/3           Balance // bars    No UE assist, CGA w/ gait belt   Tandem stance 1 min  x    Ball twists 20x   x Ball held out, rotating L/R to max rotational limits, ensuring weightshifting   Ball overhead press/low reach 20x  x Again pushing to low reach balance limits   Stepping over cones x  x Attempted, unable to get R  hip flexion, held   D1/2 chops 20x ea  x                  Other: Pt requires frequent short sitting rest breaks. Treatment Charges: Mins Units   []  Modalities     [x]  Ther Exercise  15 1   []  Manual Therapy     []  Ther Activities     []  Aquatics     [x]  Gait  25 2   []  Other   X Neuro re-ed/balance     15   1   Total Treatment time 55 4       Assessment: [x] Progressing toward goals improved R gait  with L UE on sold handrail. Slowly progressing with quad cane gait and decreasing R posterior pelvis rotation with improved wt shifting fwd over prosthetic. [] No change. [] Other:   [x] Patient would continue to benefit from skilled physical therapy services in order to: improve hip and gluteal strength and progress static and dynamic standing balance and progress to cane then no device when appropriate. STG: (to be met in 10 treatments)  1. ? ROM:R hip extension to 20 degrees to improve gait. 2. ? Strength: 5/5 R hip abduction and extension to improve R LE stability. 3. ? Function: LEFS score to 70% functional or better to improve ADLs. 4. Independent with Home Exercise Programs     LTG: (to be met in 18 treatments)  1.  Patient will be able to ambulate community distances with straight cane. 2. Patient is able to demonstrate fair standing balance without RW.      Patient goals: improve ability to ambulate, stay independent.           Pt. Education:  [x] Yes  [] No  [x] Reviewed Prior HEP/Ed  Method of Education: [x] Verbal  [x] Demo  [] Written  Comprehension of Education:  [x] Verbalizes understanding. [x] Demonstrates understanding. [] Needs review. [x] Demonstrates/verbalizes HEP/Ed previously given. Plan: [x] Continue current frequency toward long and short term goals.     [x] Specific Instructions for subsequent treatments: gait train using cane in LUE with w/c follow for balance; progress to holding items (with progressing weights) while ambulating and longer distances.           Time In:   1000        Time Out:   1100    Electronically signed by:  Katy Hall PTA

## 2020-06-05 ENCOUNTER — HOSPITAL ENCOUNTER (OUTPATIENT)
Dept: PHYSICAL THERAPY | Age: 55
Setting detail: THERAPIES SERIES
Discharge: HOME | End: 2020-06-05
Payer: MEDICAID

## 2020-06-05 PROCEDURE — 97110 THERAPEUTIC EXERCISES: CPT

## 2020-06-05 PROCEDURE — 97116 GAIT TRAINING THERAPY: CPT

## 2020-06-05 NOTE — FLOWSHEET NOTE
Retro stepping. Focusing on reciprocal gait   Dynamic march        Gait with quad cane 25 min   x Walked along barrow with rail    Gait with quad cane plus carry 5 min   x Carrying small blue ball in R hand          Stair climbing 15 min   x kait HR non reciprocal, added 6/3           Balance // bars    No UE assist, CGA w/ gait belt   Tandem stance 1 min      Ball twists 20x    Ball held out, rotating L/R to max rotational limits, ensuring weightshifting   Ball overhead press/low reach 20x   Again pushing to low reach balance limits   Stepping over cones x   Attempted, unable to get R  hip flexion, held   D1/2 chops 20x ea                    Other: Pt requires frequent short sitting rest breaks. Treatment Charges: Mins Units   []  Modalities     [x]  Ther Exercise  15 1   []  Manual Therapy     []  Ther Activities     []  Aquatics     [x]  Gait  45 3   []  Other   X Neuro re-ed/balance           Total Treatment time 55 4       Assessment: [x] Progressing toward goals. Patient demonstrated good balance during stair training today. Had difficulty with Right foot drag due to fatigue during gait training. Patient had 1 LOB that required Moderate assistance from PT to correct. [] No change. [] Other:   [x] Patient would continue to benefit from skilled physical therapy services in order to: improve hip and gluteal strength and progress static and dynamic standing balance and progress to cane then no device when appropriate. STG: (to be met in 10 treatments)  1. ? ROM:R hip extension to 20 degrees to improve gait. 2. ? Strength: 5/5 R hip abduction and extension to improve R LE stability. 3. ? Function: LEFS score to 70% functional or better to improve ADLs. 4. Independent with Home Exercise Programs     LTG: (to be met in 18 treatments)  1. Patient will be able to ambulate community distances with straight cane.    2. Patient is able to demonstrate fair standing balance without RW.      Patient

## 2020-06-08 ENCOUNTER — OFFICE VISIT (OUTPATIENT)
Dept: VASCULAR SURGERY | Age: 55
End: 2020-06-08
Payer: MEDICAID

## 2020-06-08 VITALS
SYSTOLIC BLOOD PRESSURE: 135 MMHG | DIASTOLIC BLOOD PRESSURE: 74 MMHG | TEMPERATURE: 98.2 F | HEART RATE: 65 BPM | WEIGHT: 169 LBS | HEIGHT: 65 IN | OXYGEN SATURATION: 97 % | BODY MASS INDEX: 28.16 KG/M2

## 2020-06-08 PROCEDURE — 1036F TOBACCO NON-USER: CPT | Performed by: SURGERY

## 2020-06-08 PROCEDURE — G8419 CALC BMI OUT NRM PARAM NOF/U: HCPCS | Performed by: SURGERY

## 2020-06-08 PROCEDURE — G8427 DOCREV CUR MEDS BY ELIG CLIN: HCPCS | Performed by: SURGERY

## 2020-06-08 PROCEDURE — 99214 OFFICE O/P EST MOD 30 MIN: CPT | Performed by: SURGERY

## 2020-06-08 PROCEDURE — 3017F COLORECTAL CA SCREEN DOC REV: CPT | Performed by: SURGERY

## 2020-06-08 NOTE — PROGRESS NOTES
on the left side. Pulmonary:      Effort: Pulmonary effort is normal. No respiratory distress. Abdominal:      Palpations: Abdomen is soft. Tenderness: There is no abdominal tenderness. Musculoskeletal:      Right lower leg: No edema. Left lower leg: No edema. Left foot: Normal capillary refill. No tenderness or swelling. Feet:      Right foot:      Amputation: Right leg is amputated above knee. Left foot:      Skin integrity: No ulcer or skin breakdown. Skin:     General: Skin is warm. Capillary Refill: Capillary refill takes less than 2 seconds. Neurological:      Mental Status: He is alert and oriented to person, place, and time. GCS: GCS eye subscore is 4. GCS verbal subscore is 5. GCS motor subscore is 6. Sensory: Sensation is intact. Motor: Motor function is intact. Psychiatric:         Mood and Affect: Mood normal.         Speech: Speech normal.         Behavior: Behavior normal.         Thought Content: Thought content normal.             Imaging/Labs:     Abnormal PVRs  PRANAV suggests moderate to severe vascular insufficiency at rest        Assessment and Plan:     PAD, right above knee amputation  · Continue optimal medical therapy with antiplatelet and statins  · Continue to work with therapy and new prosthesis  · He will follow up on a yearly basis with PVRs, sooner if he develops any wounds or lifestyle limiting claudication of left leg    Electronically signed by Elaine Omalley MD on 6/8/20 at 1:09 PM EDT      [address] [place] [address],[address] [facility]: [phone]  C: [phone]  Email: [email or web address]

## 2020-06-10 ENCOUNTER — HOSPITAL ENCOUNTER (OUTPATIENT)
Dept: PHYSICAL THERAPY | Age: 55
Setting detail: THERAPIES SERIES
Discharge: HOME | End: 2020-06-10
Payer: MEDICAID

## 2020-06-10 PROCEDURE — 97116 GAIT TRAINING THERAPY: CPT

## 2020-06-10 PROCEDURE — 97110 THERAPEUTIC EXERCISES: CPT

## 2020-06-12 ENCOUNTER — HOSPITAL ENCOUNTER (OUTPATIENT)
Dept: PHYSICAL THERAPY | Age: 55
Setting detail: THERAPIES SERIES
Discharge: HOME | End: 2020-06-12
Payer: MEDICAID

## 2020-06-12 NOTE — FLOWSHEET NOTE
[x] [facility]) [facility] &  Therapy  [address]    [phone]  F: [phone]   [] [address] [facility] [address]   [address]  P: [phone]  F: [phone]  [] [address]  [address] [facility]  P: [phone]  F: [phone]  [] [address]  [facility]   [address]   [place]: [phone]  F: [phone]   [] [place] [address] [facility] [address]  [place]: [phone]   F: [phone]     Physical Therapy Cancel/No Show note    Date: 2020  Patient: Fazal Gallego  : 1965  MRN: [id number]    Cancels/No Shows to date:     For today's appointment patient:    [x]  Cancelled    [] Rescheduled appointment    [] No-show     Reason given by patient:    [x]  Patient ill    []  Conflicting appointment    [] No transportation      [] Conflict with work    [] No reason given    [] Weather related    [] [id number]    [] Other:      Comments:        [] Next appointment was confirmed    Electronically signed by: Brock Uribe PT

## 2020-06-14 ASSESSMENT — ENCOUNTER SYMPTOMS
COLOR CHANGE: 0
ABDOMINAL PAIN: 0
CHEST TIGHTNESS: 0
ALLERGIC/IMMUNOLOGIC NEGATIVE: 1
SHORTNESS OF BREATH: 0

## 2020-06-24 ENCOUNTER — HOSPITAL ENCOUNTER (OUTPATIENT)
Dept: PHYSICAL THERAPY | Age: 55
Setting detail: THERAPIES SERIES
Discharge: HOME | End: 2020-06-24
Payer: MEDICAID

## 2020-06-24 PROCEDURE — 97110 THERAPEUTIC EXERCISES: CPT

## 2020-06-24 PROCEDURE — 97116 GAIT TRAINING THERAPY: CPT

## 2020-06-24 NOTE — FLOWSHEET NOTE
hand          Stair climbing 15 min    kait HR non reciprocal, added 6/3           Balance // bars    No UE assist, CGA w/ gait belt   Tandem stance  min      Ball twists x    Ball held out, rotating L/R to max rotational limits, ensuring weightshifting   Ball overhead press/low reach x   Again pushing to low reach balance limits   Stepping over cones x   Attempted, unable to get R  hip flexion, held   D1/2 chops x ea                    Other: Pt requires frequent short sitting rest breaks. Treatment Charges: Mins Units   []  Modalities     [x]  Ther Exercise  15 1   []  Manual Therapy     []  Ther Activities     []  Aquatics     [x]  Gait  40 3   []  Other   X Neuro re-ed/balance           Total Treatment time 55 4       Assessment: [x] Progressing toward goals. R hip strength and ROM are improving well. [x] No change. Patient struggled with gait today due to his prosthetic slipping and causing pain during load bearing. Suggested to bring up concerns at next prosthetist appointment. [] Other:   [x] Patient would continue to benefit from skilled physical therapy services in order to: improve hip and gluteal strength and progress static and dynamic standing balance and progress to cane then no device when appropriate. LEFS 50% Functional   MMT: 4/5 R hip abduction, and extension     STG: (to be met in 10 treatments)  1. ? ROM:R hip extension to 20 degrees to improve gait. Met  2. ? Strength: 5/5 R hip abduction and extension to improve R LE stability. Progress  3. ? Function: LEFS score to 70% functional or better to improve ADLs. Progress  4. Independent with Home Exercise Programs Met     LTG: (to be met in 18 treatments)  1. Patient will be able to ambulate community distances with straight cane. 2. Patient is able to demonstrate fair standing balance without RW.      Patient goals: improve ability to ambulate, stay independent.           Pt.  Education:  [x] Yes  [] No  [x] Reviewed Prior

## 2020-06-26 ENCOUNTER — HOSPITAL ENCOUNTER (OUTPATIENT)
Dept: PHYSICAL THERAPY | Age: 55
Setting detail: THERAPIES SERIES
Discharge: HOME | End: 2020-06-26
Payer: MEDICAID

## 2020-06-26 PROCEDURE — 97110 THERAPEUTIC EXERCISES: CPT

## 2020-06-26 PROCEDURE — 97116 GAIT TRAINING THERAPY: CPT

## 2020-06-26 NOTE — FLOWSHEET NOTE
[x] [facility]  [address]  [phone]  F: [phone]        Physical Therapy Daily Treatment Note    Date:  2020  Patient Name:  Amari Castillo    :  1965   MRN: [id number]  Physician: Sukhi Floyd MD      Insurance: [facility]  Approval was received for 72 visits, from 20 to 10/15/20. Authorization number [id number].  98812, 72 units  80367, 72 units  89478, 72 units  65993, 72 units  04437, 72 units  Medical Diagnosis: Amputee of R extremity Z89.9                   Rehab Codes: R26.2, R53.1, Z74.09  Onset Date:  2019                                  Next 's appt: Prosthetist  6/15/20  Medical Diagnosis: Amputee of extremity Z89.9                   Visit# / total visits:  ; Progress note for Medicare patient due at visit 10    Cancels/No Shows: 0/0    Subjective:     Pain:  [x] Yes  [] No Location:  R LE  Pain Rating: (0-10 scale) 0/10  Pain altered Tx:  [x] No  [] Yes  Action:  Comments:  Reports he had a bad day with prosthetic fit yesterday. Reports the knee mechanics of current prosthetic was adjusted last time this PTA has worked with PT. Reports he has a small redden area of skin at lateral distal femur when asking pt about skin checks. Pt is keeping an eye on this area. Objective:  Modalities: as needed. Precautions:  Exercises:  Completed exercises marked with X  Exercise  Right AKA Reps/ Time Weight/ Level  Comments   Nustep 8 min  L4 x           Sitting       Hip abd 15x blueberry x            Standing // bars    kait UE support   R 4 way hip SLR w/ iso abdom 15x ea  x    L 4 way hip SLR w/ iso abdom  15x       Wt shifting.      x Fwd, lateral , retro   Side stepping over therabar 10x1      hip hikes 2 \" block    B LE, Progressed reps 5/15   Squats 15x2   x     Box Squats 3x10x      March 15x2                    Gait training    Completed at inpatient rail today   FWD gait with rail     x    Side stepping 30x  x UE support   Retro stepping. Focusing on reciprocal gait   Dynamic march        Gait with quad cane 5 mins   x Unsafe this date due to poor prosthetic fit. Gait with quad cane plus carry  min    Carrying small blue ball in R hand          Stair climbing 15 min    kait HR non reciprocal, added 6/3           Balance // bars    No UE assist, CGA w/ gait belt   Tandem stance  min      Ball twists x   x Ball held out, rotating L/R to max rotational limits, ensuring weightshifting   Ball overhead press/low reach x  x Again pushing to low reach balance limits   Stepping over cones x   Attempted, unable to get R  hip flexion, held   D1/2 chops x ea                    Other: Session interrupted as pt has to completely take prosthesis and sleeves off to reapply for correct fix/alignment. Educated pt on importance of address HEP ie bridging, 4 way hip in standing and on bed, resistive hip abd.as pt demonstrated weak gluteals when prosthetic knee patrica in standing ( trying to get prosthetic neutral)    Treatment Charges: Mins Units   []  Modalities     [x]  Ther Exercise  25 2   []  Manual Therapy     []  Ther Activities     []  Aquatics     [x]  Gait  15  1   []  Other   X Neuro re-ed/balance           Total Treatment time  40 3       Assessment: [x] Progressing toward goals. [x] No change again patient struggled with gait and balance exercises  today due to his prosthetic slipping and causing pain during load bearing, pt took time to doff and re- don. Pt demonstrates poor wt bearing/stability thru prosthesis with wt shifting activites, relies on UE greatly. Weak gluteals. (pt not addressing HEP on regular basis when discussing program)     [] Other:   [x] Patient would continue to benefit from skilled physical therapy services in order to: improve hip and gluteal strength and progress static and dynamic standing balance and progress to cane then no device when appropriate.     LEFS 50% Functional   MMT:

## 2020-06-30 ENCOUNTER — HOSPITAL ENCOUNTER (OUTPATIENT)
Dept: PHYSICAL THERAPY | Age: 55
Setting detail: THERAPIES SERIES
Discharge: HOME | End: 2020-06-30
Payer: MEDICAID

## 2020-06-30 PROCEDURE — 97110 THERAPEUTIC EXERCISES: CPT

## 2020-06-30 PROCEDURE — 97116 GAIT TRAINING THERAPY: CPT

## 2020-06-30 NOTE — FLOWSHEET NOTE
[x] [facility]  [address]  [phone]  F: [phone]        Physical Therapy Daily Treatment Note    Date:  2020  Patient Name:  Yareli Abreu    :  1965   MRN: [id number]  Physician: Jimmie Wilkinson MD      Insurance: [facility]  Approval was received for 72 visits, from 20 to 10/15/20. Authorization number [id number].  70202, 72 units  42862, 72 units  47933, 72 units  23375, 72 units  61533, 72 units  Medical Diagnosis: Amputee of R extremity Z89.9                   Rehab Codes: R26.2, R53.1, Z74.09  Onset Date:  2019                                  Next 's appt: Prosthetist  6/15/20  Medical Diagnosis: Amputee of extremity Z89.9                   Visit# / total visits: 15/18 ; Progress note for Medicare patient due at visit 10    Cancels/No Shows: 0/0    Subjective:     Pain:  [x] Yes  [] No Location:  R LE  Pain Rating: (0-10 scale) 0/10  Pain altered Tx:  [x] No  [] Yes  Action:  Comments:  Patient is doing well, reports red spot on his leg is improving. Objective:  Modalities: as needed. Precautions:  Exercises:  Completed exercises marked with X  Exercise  Right AKA Reps/ Time Weight/ Level  Comments   Nustep 8 min  L4 x No UE support          Sitting       Hip abd 15x blueberry x            Standing // bars    kait UE support   R 4 way hip SLR w/ iso abdom 20x ea 2 lb x    L 4 way hip SLR w/ iso abdom  15x       Wt shifting. x Fwd, lateral , retro   Side stepping over therabar 10x1      hip hikes 2 \" block    B LE, Progressed reps 5/15   Squats 15x2        Box Squats 3x10x  x    March 15x2                    Gait training    Completed at inpatient rail today   FWD gait with rail         Side stepping   30x   UE support   Retro stepping. Focusing on reciprocal gait   Dynamic march        Gait with quad cane 30 mins   x Unsafe this date due to poor prosthetic fit.     Gait with quad cane plus carry  min Carrying small blue ball in R hand          Stair climbing 15 min    kait HR non reciprocal, added 6/3           Balance // bars    No UE assist, CGA w/ gait belt   Tandem stance  min      Ball twists x    Ball held out, rotating L/R to max rotational limits, ensuring weightshifting   Ball overhead press/low reach x   Again pushing to low reach balance limits   Stepping over cones x   Attempted, unable to get R  hip flexion, held   D1/2 chops x ea                    Other:     Treatment Charges: Mins Units   []  Modalities     [x]  Ther Exercise  30 2   []  Manual Therapy     []  Ther Activities     []  Aquatics     [x]  Gait  30  2   []  Other   X Neuro re-ed/balance           Total Treatment time  60 4       Assessment: [x] Progressing toward goals. Patient demonstrated improved ambulation endurance during treatment session, his hip is still very fatigued after an hour though. Instructed to continue to advance his HEP by increasing repetitions, also suggested purchasing a SBQC to practice gait inside his apartment in a safe location where additional UE support can be used if needed. [] No change     [] Other:   [x] Patient would continue to benefit from skilled physical therapy services in order to: improve hip and gluteal strength and progress static and dynamic standing balance and progress to cane then no device when appropriate. STG: (to be met in 10 treatments)  1. ? ROM:R hip extension to 20 degrees to improve gait. Met  2. ? Strength: 5/5 R hip abduction and extension to improve R LE stability. Progress  3. ? Function: LEFS score to 70% functional or better to improve ADLs. Progress  4. Independent with Home Exercise Programs Met     LTG: (to be met in 18 treatments)  1. Patient will be able to ambulate community distances with straight cane. 2. Patient is able to demonstrate fair standing balance without RW.      Patient goals: improve ability to ambulate, stay independent.           Pt.

## 2020-07-02 ENCOUNTER — HOSPITAL ENCOUNTER (OUTPATIENT)
Dept: PHYSICAL THERAPY | Age: 55
Setting detail: THERAPIES SERIES
Discharge: HOME | End: 2020-07-02
Payer: MEDICAID

## 2020-07-02 PROCEDURE — 97110 THERAPEUTIC EXERCISES: CPT

## 2020-07-02 PROCEDURE — 97116 GAIT TRAINING THERAPY: CPT

## 2020-07-02 NOTE — FLOWSHEET NOTE
[x] [facility]  [address]  [phone]  F: [phone]        Physical Therapy Daily Treatment Note    Date:  2020  Patient Name:  Lay Carroll    :  1965   MRN: [id number]  Physician: Tawanna Dee MD      Insurance: [facility]  Approval was received for 72 visits, from 20 to 10/15/20. Authorization number [id number].  48424, 72 units  63629, 72 units  87053, 72 units  81474, 72 units  50662, 72 units  Medical Diagnosis: Amputee of R extremity Z89.9                   Rehab Codes: R26.2, R53.1, Z74.09  Onset Date:  2019                                  Next 's appt: Prosthetist  6/15/20  Medical Diagnosis: Amputee of extremity Z89.9                   Visit# / total visits:  ; Progress note for Medicare patient due at visit 10    Cancels/No Shows: 0/0    Subjective:     Pain:  [x] Yes  [] No Location:  R LE  Pain Rating: (0-10 scale) 0/10  Pain altered Tx:  [x] No  [] Yes  Action:  Comments:  Patient is doing well, reports red spot on his leg is improving. Objective:  Modalities: as needed. Precautions:  Exercises:  Completed exercises marked with X  Exercise  Right AKA Reps/ Time Weight/ Level  Comments   Nustep 8 min  L4 x No UE support          Sitting       Hip abd 15x blueberry             Standing // bars    kait UE support   R 4 way hip SLR w/ iso abdom 20x ea 2 lb x    L 4 way hip SLR w/ iso abdom  15x       Wt shifting. Fwd, lateral , retro   Side stepping over therabar 10x1      hip hikes 2 \" block    B LE, Progressed reps 5/15   Squats 15x2        Box Squats 3x10x  x    March 15x2                    Gait training    Completed at inpatient rail today   FWD gait with rail         Side stepping   30x   UE support   Retro stepping. Focusing on reciprocal gait   Dynamic march        Gait with quad cane 40 mins   x Continue to stress stride length, foot clearance, endurance vs energy conservation.  planning rest stops. Gait with quad cane plus carry  min    Carrying small blue ball in R hand          Stair climbing 15 min    kait HR non reciprocal, added 6/3           Balance // bars    No UE assist, CGA w/ gait belt   Tandem stance  min      Ball twists x    Ball held out, rotating L/R to max rotational limits, ensuring weightshifting   Ball overhead press/low reach x   Again pushing to low reach balance limits   Stepping over cones x   Attempted, unable to get R  hip flexion, held   D1/2 chops x ea                    Other:     Treatment Charges: Mins Units   []  Modalities     [x]  Ther Exercise  15 1   []  Manual Therapy     []  Ther Activities     []  Aquatics     [x]  Gait  45  3   []  Other   X Neuro re-ed/balance           Total Treatment time  60 4     UNIT TOTAL as of today:    There Ex 26  Gait training 24  Neuromuscular Re-Ed  4    Assessment: [x] Progressing toward goals. Patient is improving his gait pattern with [employer] but did have 1 LOB that required Min A to correct. Patient had difficulty ambulating over 2 inch hurdles due to foot clearance and balance, but was improved compared to last attempt. Reviewed different types of SBQC on the Internet and what models would be best suited for him. [] No change     [] Other:   [x] Patient would continue to benefit from skilled physical therapy services in order to: improve hip and gluteal strength and progress static and dynamic standing balance and progress to cane then no device when appropriate. STG: (to be met in 10 treatments)  1. ? ROM:R hip extension to 20 degrees to improve gait. Met  2. ? Strength: 5/5 R hip abduction and extension to improve R LE stability. Progress  3. ? Function: LEFS score to 70% functional or better to improve ADLs. Progress  4. Independent with Home Exercise Programs Met     LTG: (to be met in 18 treatments)  1. Patient will be able to ambulate community distances with straight cane.     2. Patient is able to demonstrate fair standing balance without RW.      Patient goals: improve ability to ambulate, stay independent.           Pt. Education:  [x] Yes  [] No  [x] Reviewed Prior HEP/Ed  Method of Education: [x] Verbal  [x] Demo  [] Written  Comprehension of Education:  [x] Verbalizes understanding. [x] Demonstrates understanding. [] Needs review. [x] Demonstrates/verbalizes HEP/Ed previously given. Plan: [x] Continue current frequency toward long and short term goals. [x] Specific Instructions for subsequent treatments: Continue to push ambulation endurance.  Extend POC       Time In:  1000       Time Out:    1100    Electronically signed by:  Yocasta Navarro, PT

## 2020-07-08 ENCOUNTER — HOSPITAL ENCOUNTER (OUTPATIENT)
Dept: PHYSICAL THERAPY | Age: 55
Setting detail: THERAPIES SERIES
Discharge: HOME | End: 2020-07-08
Payer: MEDICAID

## 2020-07-08 PROCEDURE — 97110 THERAPEUTIC EXERCISES: CPT

## 2020-07-08 PROCEDURE — 97116 GAIT TRAINING THERAPY: CPT

## 2020-07-08 NOTE — FLOWSHEET NOTE
[x] [facility] [facility]  [address]  [phone]  F: [phone]        Physical Therapy Daily Treatment Note    Date:  2020  Patient Name:  Michael Monterroso    :  1965   MRN: [id number]  Physician: Sonny Sharma MD      Insurance: [facility]  Approval was received for 72 visits, from 20 to 10/15/20. Authorization number [id number].  25973, 72 units  51899, 72 units  58738, 72 units  54784, 72 units  37334, 72 units  Medical Diagnosis: Amputee of R extremity Z89.9                   Rehab Codes: R26.2, R53.1, Z74.09  Onset Date:  2019                                  Next 's appt: Prosthetist  6/15/20  Medical Diagnosis: Amputee of extremity Z89.9                   Visit# / total visits:  ; Progress note for Medicare patient due at visit 10    Cancels/No Shows: 0/0    Subjective:     Pain:  [x] Yes  [] No Location:  R LE  Pain Rating: (0-10 scale) 0/10  Pain altered Tx:  [x] No  [] Yes  Action:  Comments:  Patient arrives in shorts today, had a good weekend was able to walk on grass and gravel with his walker. Objective:  Modalities: as needed. Precautions:  Exercises:  Completed exercises marked with X  Exercise  Right AKA Reps/ Time Weight/ Level  Comments   Nustep 8 min  L4 x No UE support          Sitting       Hip abd 15x blueberry             Standing // bars    kait UE support   R 4 way hip SLR w/ iso abdom 20x ea 2 lb x    L 4 way hip SLR w/ iso abdom  15x       Wt shifting. Fwd, lateral , retro   Side stepping over therabar 10x1      hip hikes 2 \" block    B LE, Progressed reps 5/15   Squats 15x2        Box Squats 3x10x  x    March 15x2                    Gait training    Completed at inpatient rail today   FWD gait with rail         Side stepping   30x   UE support   Retro stepping.      Focusing on reciprocal gait   Gait with RW 10 min    x Limited R UE handhold   Gait with quad cane 30 mins   x Continue to stress stride length, foot clearance, endurance vs energy conservation. planning rest stops. Gait with quad cane plus carry  min    Carrying small blue ball in R hand          Stair climbing 15 min    kait HR non reciprocal, added 6/3           Balance // bars    No UE assist, CGA w/ gait belt   Tandem stance  min      Ball twists x    Ball held out, rotating L/R to max rotational limits, ensuring weightshifting   Ball overhead press/low reach x   Again pushing to low reach balance limits   Stepping over cones x   Attempted, unable to get R  hip flexion, held   D1/2 chops x ea                    Other:     Treatment Charges: Mins Units   []  Modalities     [x]  Ther Exercise  10 1   []  Manual Therapy     []  Ther Activities     []  Aquatics     [x]  Gait  50  3   []  Other   X Neuro re-ed/balance           Total Treatment time  60 4     UNIT TOTAL as of today:    There Ex 27  Gait training 27  Neuromuscular Re-Ed  4    Assessment: [x] Progressing toward goals. Patient initially struggled with gait training due to his foot catching and causing premature knee flexion during gait cycle. Patient felt an air pocket in his socket today, likely needs thicker sock, discussed using 3 ply vs 1 ply. [] No change     [] Other:   [x] Patient would continue to benefit from skilled physical therapy services in order to: improve hip and gluteal strength and progress static and dynamic standing balance and progress to cane then no device when appropriate. STG: (to be met in 10 treatments)  1. ? ROM:R hip extension to 20 degrees to improve gait. Met  2. ? Strength: 5/5 R hip abduction and extension to improve R LE stability. Progress  3. ? Function: LEFS score to 70% functional or better to improve ADLs. Progress  4. Independent with Home Exercise Programs Met     LTG: (to be met in 18 treatments)  1. Patient will be able to ambulate community distances with straight cane. Not Met  2.  Patient is able to demonstrate fair standing balance without RW. Met.      Patient goals: improve ability to ambulate, stay independent.           Pt. Education:  [x] Yes  [] No  [x] Reviewed Prior HEP/Ed  Method of Education: [x] Verbal  [x] Demo  [] Written  Comprehension of Education:  [x] Verbalizes understanding. [x] Demonstrates understanding. [] Needs review. [x] Demonstrates/verbalizes HEP/Ed previously given. Plan: [x] Continue current frequency toward long and short term goals. [x] Specific Instructions for subsequent treatments: Continue to reduce assistance with Quad cane.        Time In:  1000       Time Out:  1100    Electronically signed by:  Avila Dunn, PT

## 2020-07-10 ENCOUNTER — HOSPITAL ENCOUNTER (OUTPATIENT)
Dept: PHYSICAL THERAPY | Age: 55
Setting detail: THERAPIES SERIES
Discharge: HOME | End: 2020-07-10
Payer: MEDICAID

## 2020-07-10 PROCEDURE — 97112 NEUROMUSCULAR REEDUCATION: CPT

## 2020-07-10 PROCEDURE — 97110 THERAPEUTIC EXERCISES: CPT

## 2020-07-10 PROCEDURE — 97116 GAIT TRAINING THERAPY: CPT

## 2020-07-10 NOTE — FLOWSHEET NOTE
[x] [facility] [address]  [address]  [phone]  F: [phone]        Physical Therapy Daily Treatment Note    Date:  7/10/2020  Patient Name:  Parul Dominguez    :  1965   MRN: [id number]  Physician: Sonja Rubio MD      Insurance: [facility]  Approval was received for 72 visits, from 20 to 10/15/20. Authorization number [id number].  19201, 72 units  70808, 72 units  81960, 72 units  74492, 72 units  62247, 72 units  Medical Diagnosis: Amputee of R extremity Z89.9                   Rehab Codes: R26.2, R53.1, Z74.09  Onset Date:  2019                                  Next 's appt: Prosthetist  6/15/20  Medical Diagnosis: Amputee of extremity Z89.9                   Visit# / total visits:  ; Progress note for Medicare patient due at visit 10    Cancels/No Shows: 0/0    Subjective:     Pain:  [] Yes  [] No Location:  R LE  Pain Rating: (0-10 scale) 0/10  Pain altered Tx:  [x] No  [] Yes  Action:  Comments:  7 mins late, cab late. Reports he walk on unlevel terrain with walker last weekend. Objective:  Modalities: as needed. Precautions:  Exercises:  Completed exercises marked with X  Exercise  Right AKA Reps/ Time Weight/ Level  Comments   Nustep 8 min  L4   No UE support          Sitting       Hip abd 15x blueberry             Standing // bars    kait UE support   R 4 way hip SLR w/ iso abdom 20x ea 2 lb x    L 4 way hip SLR w/ iso abdom  15x       Wt shifting. Fwd, lateral , retro   hip hikes 2 \" block    B LE, Progressed reps 5/15   Squats 15x2        Box Squats 3x10x  x    March 15x2                    Gait training    Completed at inpatient rail today   FWD gait with rail         Side stepping   30x   UE support   Retro stepping. Focusing on reciprocal gait   Gait with RW 10 min     Limited R UE handhold   Gait with quad cane 15 mins   x Continue to stress stride length, foot clearance, endurance vs energy conservation. planning rest stops. Walking on/off green foam quad cane  4 ft x 4x  x LOB 1x that required min  assist to regain bal. Knee flexed in stance, added 7/10   Gait with quad cane plus carry  min    Carrying small blue ball in R hand          Stair climbing 15 min    kait HR non reciprocal, added 6/3           Balance // bars    NO UE to intermittent UE on HR  w/ gait belt   Tandem stance  min      Side stepping  15 ft x 2  x    Ball twists 10x ea  x Ball held out, rotating L/R to max rotational limits, ensuring weightshifting   Ball overhead press/low reach 10x  x Again pushing to low reach balance limits   Stepping over cones x   Attempted, unable to get R  hip flexion, held   D1/2 chops x ea      Lateral               Other: Held nustep due to late arrival.     Treatment Charges: Mins Units   []  Modalities     [x]  Ther Exercise 15 1   []  Manual Therapy     []  Ther Activities     []  Aquatics     [x]  Gait 15 1   []  Other   X Neuro re-ed/balance 15 1   Total Treatment time 45 3     UNIT TOTAL as of today:    There Ex 28  Gait training 28  Neuromuscular Re-Ed  5    Assessment: [x] Progressing toward goals demonstrates improved push off thru prosthesis  vs the torso/pelvis  rotational movements he demonstrated  with prior sessions. LOB 1x with min assist to regain stability with walking over foam., (prosthesis knee flexed in tramaine)      [] No change     [] Other:   [x] Patient would continue to benefit from skilled physical therapy services in order to: improve hip and gluteal strength and progress static and dynamic standing balance and progress to cane then no device when appropriate. STG: (to be met in 10 treatments)  1. ? ROM:R hip extension to 20 degrees to improve gait. Met  2. ? Strength: 5/5 R hip abduction and extension to improve R LE stability. Progress  3. ? Function: LEFS score to 70% functional or better to improve ADLs. Progress  4.  Independent with Home Exercise Programs Met     LTG: (to be

## 2020-07-13 RX ORDER — CLOPIDOGREL BISULFATE 75 MG/1
TABLET ORAL
Qty: 30 TABLET | Refills: 3 | Status: SHIPPED | OUTPATIENT
Start: 2020-07-13 | End: 2020-11-16

## 2020-07-13 NOTE — TELEPHONE ENCOUNTER
Last visit: 04/14/20  Last Med refill:   Does patient have enough medication for 72 hours:     Next Visit Date:  Future Appointments   Date Time Provider Katelyn Omeri   7/15/2020 11:00 AM Ericka Kessler, PT [facility]   7/17/2020 11:00 AM Harman Mihaela, PTA [facility]   Topic Date Due    Hepatitis C screen  1965    Diabetic retinal exam  06/08/1974    HIV screen  06/08/1979    Hepatitis B vaccine (1 of 3 - Risk 3-dose series) 06/08/1983    Colon cancer screen colonoscopy  06/08/2014    Shingles Vaccine (2 of 2) 01/03/2020    Lipid screen  04/17/2020    Flu vaccine (1) 09/01/2020    Diabetic foot exam  09/27/2020    Potassium monitoring  09/28/2020    Creatinine monitoring  09/28/2020    A1C test (Diabetic or Prediabetic)  12/02/2020    Diabetic microalbuminuria test  12/02/2020    DTaP/Tdap/Td vaccine (2 - Td) 11/11/2025    Pneumococcal 0-64 years Vaccine  Completed    Hepatitis A vaccine  Aged Out    Hib vaccine  Aged Out    Meningococcal (ACWY) vaccine  Aged Out       Hemoglobin A1C (%)   Date Value   12/02/2019 6.6   08/20/2019 10.0 (H)   07/18/2019 8.4             ( goal A1C is < 7)   Microalb/Crt.  Ratio (mcg/mg creat)   Date Value   12/02/2019 CANNOT BE CALCULATED     LDL Cholesterol (mg/dL)   Date Value   04/17/2019 22       (goal LDL is <100)   AST (U/L)   Date Value   05/04/2019 26     ALT (U/L)   Date Value   05/04/2019 43 (H)     BUN (mg/dL)   Date Value   08/30/2019 9     BP Readings from Last 3 Encounters:   06/08/20 135/74   03/11/20 (!) 184/77   12/02/19 (!) 153/78          (goal 120/80)    All Future Testing planned in [facility]  Lab Frequency Next Occurrence   Hepatitis C Antibody Once 07/18/2020   HIV Screen Once 07/18/2020   Cologuard (For External Results Only) Once 10/10/2020   VL ARTERIAL PVR LOWER WO EXERCISE Once 06/14/2021               Patient Active Problem List:     PAD (peripheral artery disease) ([address] Subacute osteomyelitis of right tibia (HCC)     Metabolic encephalopathy     Abnormal CT of the head     Hepatitis     Acute cerebral infarction associated with systemic hypoxia or ischemia (HCC)     Multi infarct state     Carotid stenosis, bilateral     Right foot drop     Thrombocytosis (HCC)     Acute CVA (cerebrovascular accident) ([address]     Moderate malnutrition ([address]     Cellulitis of right lower extremity     Right-sided extracranial carotid artery stenosis     Essential hypertension     Type 2 diabetes mellitus with diabetic neuropathy, without long-term current use of insulin (HCC)     Leukocytosis     Status post angiography of extremity     Ischemic gangrene ([address]     Pyogenic inflammation of bone ([address]     Coronary artery disease     Above knee amputation of right lower extremity ([address]

## 2020-07-15 ENCOUNTER — HOSPITAL ENCOUNTER (OUTPATIENT)
Dept: PHYSICAL THERAPY | Age: 55
Setting detail: THERAPIES SERIES
Discharge: HOME | End: 2020-07-15
Payer: MEDICAID

## 2020-07-15 PROCEDURE — 97116 GAIT TRAINING THERAPY: CPT

## 2020-07-15 PROCEDURE — 97110 THERAPEUTIC EXERCISES: CPT

## 2020-07-15 NOTE — FLOWSHEET NOTE
[x] [facility] [address]  [address]  [phone]  F: [phone]        Physical Therapy Daily Treatment Note    Date:  7/15/2020  Patient Name:  Ruby Christiansen    :  1965   MRN: [id number]  Physician: Stanford Rueda MD      Insurance: [facility]  Approval was received for 72 visits, from 20 to 10/15/20. Authorization number [id number].  97920, 72 units  80693, 72 units  68532, 72 units  58283, 72 units  19429, 72 units  Medical Diagnosis: Amputee of R extremity Z89.9                   Rehab Codes: R26.2, R53.1, Z74.09  Onset Date:  2019                                  Next 's appt: Prosthetist  6/15/20  Medical Diagnosis: Amputee of extremity Z89.9                   Visit# / total visits: ; Progress note for Medicare patient due at visit 10    Cancels/No Shows: 0/0    Subjective:     Pain:  [] Yes  [] No Location:  R LE  Pain Rating: (0-10 scale) 0/10  Pain altered Tx:  [x] No  [] Yes  Action:  Comments:   Patient is doing well today, prosthetic limb is fitting well today. Still looking to buy quad cane. Objective:  Modalities: as needed. Precautions:  Exercises:  Completed exercises marked with X  Exercise  Right AKA Reps/ Time Weight/ Level  Comments   Nustep 8 min  L4   No UE support, try Scifit          Sitting       Hip abd 15x blueberry             Standing // bars    kait UE support   R 4 way hip SLR w/ iso abdom 20x ea 2 lb x    L 4 way hip SLR w/ iso abdom  15x       Wt shifting. Fwd, lateral , retro   hip hikes 2 \" block    B LE, Progressed reps 5/15   Squats 15x2        Box Squats 3x10x      March 15x2                    Gait training    Completed at inpatient rail today   FWD gait with rail         Side stepping   5 min   x UE support   Retro stepping.      Focusing on reciprocal gait   Gait with RW 10 min     Limited R UE handhold   Gait with quad cane 30 mins   x Continue to stress stride length, foot clearance, endurance vs energy conservation. planning rest stops. Walking on/off green foam quad cane  4 ft x 4x   LOB 1x that required min  assist to regain bal. Knee flexed in stance, added 7/10   Gait with quad cane plus carry  min    Carrying small blue ball in R hand   Mini hurdles 5 min   x    Stair climbing 15 min    akit HR non reciprocal, added 6/3           Balance // bars    NO UE to intermittent UE on HR  w/ gait belt   Tandem stance  min      Side stepping  15 ft x 2      Ball twists 10x ea   Ball held out, rotating L/R to max rotational limits, ensuring weightshifting   Ball overhead press/low reach 10x   Again pushing to low reach balance limits   Stepping over cones x   Attempted, unable to get R  hip flexion, held   D1/2 chops x ea      Lateral               Other: Held nustep due to late arrival.     Treatment Charges: Mins Units   []  Modalities     [x]  Ther Exercise 15 1   []  Manual Therapy     []  Ther Activities     []  Aquatics     [x]  Gait 45 3   []  Other   X Neuro re-ed/balance     Total Treatment time 45 3     UNIT TOTAL as of today:    There Ex 29  Gait training 31  Neuromuscular Re-Ed  5    Assessment: [x] Progressing toward goals. Patient demonstrates improved balance during gait, did not require assistance to regain balance after missteps. Still limited by R hip and contralateral leg fatigue. [] No change     [] Other:   [x] Patient would continue to benefit from skilled physical therapy services in order to: improve hip and gluteal strength and progress static and dynamic standing balance and progress to cane then no device when appropriate. STG: (to be met in 10 treatments)  1. ? ROM:R hip extension to 20 degrees to improve gait. Met  2. ? Strength: 5/5 R hip abduction and extension to improve R LE stability. Progress  3. ? Function: LEFS score to 70% functional or better to improve ADLs. Progress  4.  Independent with Home Exercise Programs Met     LTG: (to be met in 30

## 2020-07-17 ENCOUNTER — HOSPITAL ENCOUNTER (OUTPATIENT)
Dept: PHYSICAL THERAPY | Age: 55
Setting detail: THERAPIES SERIES
Discharge: HOME | End: 2020-07-17
Payer: MEDICAID

## 2020-07-17 PROCEDURE — 97110 THERAPEUTIC EXERCISES: CPT

## 2020-07-17 PROCEDURE — 97116 GAIT TRAINING THERAPY: CPT

## 2020-07-17 NOTE — FLOWSHEET NOTE
extension to 20 degrees to improve gait. Met  2. ? Strength: 5/5 R hip abduction and extension to improve R LE stability. Progress  3. ? Function: LEFS score to 70% functional or better to improve ADLs. Progress  4. Independent with Home Exercise Programs Met     LTG: (to be met in 30 treatments)  1. Patient will be able to ambulate community distances with straight cane. Not Met  2. Patient is able to demonstrate fair standing balance without RW. Met.      Patient goals: improve ability to ambulate, stay independent.           Pt. Education:  [x] Yes  [] No  [x] Reviewed Prior HEP/Ed  Method of Education: [x] Verbal  [x] Demo  [] Written  Comprehension of Education:  [x] Verbalizes understanding. [x] Demonstrates understanding. [] Needs review. [x] Demonstrates/verbalizes HEP/Ed previously given. Plan: [x] Continue current frequency toward long and short term goals. [x] Specific Instructions for subsequent treatments: Continue to reduce assistance with Quad cane.        Time In:  11:07 AM       Time Out:   12:00 PM    Electronically signed by:  Asim Jarvis PTA

## 2020-07-22 ENCOUNTER — HOSPITAL ENCOUNTER (OUTPATIENT)
Dept: PHYSICAL THERAPY | Age: 55
Setting detail: THERAPIES SERIES
Discharge: HOME | End: 2020-07-22
Payer: MEDICAID

## 2020-07-22 NOTE — FLOWSHEET NOTE
[x] [facility]) [facility] &  Therapy  [address]    [phone]  F: [phone]   [] [address]  [facility] [address]   [address]  P: [phone]  F: [phone]  [] [place]  [address]  P: [phone]  F: [phone]  [] [address]  [facility]   [address]   [place]: [phone]  F: [phone]   [] [place] [address]  [place]: [phone]   F: [phone]     Physical Therapy Cancel/No Show note    Date: 2020  Patient: Maylin Almaraz  : 1965  MRN: [id number]    Cancels/No Shows to date: 3/1    For today's appointment patient:    [x]  Cancelled    [] Rescheduled appointment    [] No-show     Reason given by patient:    []  Patient ill    []  Conflicting appointment    [x] No transportation      [] Conflict with work    [] No reason given    [] Weather related    [] COVID-19    [] Other:      Comments:  Unable to get transportation this date. Insurance transportation has ended has been trying to get rides through job and family services. Spoke to Bernetta Boxer at [employer] and she was able to approve temporary transportation starting Friday.        [] Next appointment was confirmed    Electronically signed by: Trisha Calvin, PT

## 2020-07-23 NOTE — TELEPHONE ENCOUNTER
E-scribe request for basaglar, atorvastatin, metoprolol and lisinopril. Please review and e-scribe if applicable. Next Visit Date:  Visit date not found    Hemoglobin A1C (%)   Date Value   12/02/2019 6.6   08/20/2019 10.0 (H)   07/18/2019 8.4             ( goal A1C is < 7)   Microalb/Crt.  Ratio (mcg/mg creat)   Date Value   12/02/2019 CANNOT BE CALCULATED     LDL Cholesterol (mg/dL)   Date Value   04/17/2019 22       (goal LDL is <100)   AST (U/L)   Date Value   05/04/2019 26     ALT (U/L)   Date Value   05/04/2019 43 (H)     BUN (mg/dL)   Date Value   08/30/2019 9     BP Readings from Last 3 Encounters:   06/08/20 135/74   03/11/20 (!) 184/77   12/02/19 (!) 153/78          (goal 120/80)        Patient Active Problem List:     PAD (peripheral artery disease) (HCC)     Subacute osteomyelitis of right tibia (HCC)     Metabolic encephalopathy     Abnormal CT of the head     Hepatitis     Acute cerebral infarction associated with systemic hypoxia or ischemia (HCC)     Multi infarct state     Carotid stenosis, bilateral     Right foot drop     Thrombocytosis (HCC)     Acute CVA (cerebrovascular accident) ([address]     Moderate malnutrition ([address]     Cellulitis of right lower extremity     Right-sided extracranial carotid artery stenosis     Essential hypertension     Type 2 diabetes mellitus with diabetic neuropathy, without long-term current use of insulin (HCC)     Leukocytosis     Status post angiography of extremity     Ischemic gangrene ([address]     Pyogenic inflammation of bone (HCC)     Coronary artery disease     Above knee amputation of right lower extremity ([address]      MA

## 2020-07-24 ENCOUNTER — HOSPITAL ENCOUNTER (OUTPATIENT)
Dept: PHYSICAL THERAPY | Age: 55
Setting detail: THERAPIES SERIES
Discharge: HOME | End: 2020-07-24
Payer: MEDICAID

## 2020-07-24 PROCEDURE — 97116 GAIT TRAINING THERAPY: CPT

## 2020-07-24 PROCEDURE — 97112 NEUROMUSCULAR REEDUCATION: CPT

## 2020-07-24 PROCEDURE — 97110 THERAPEUTIC EXERCISES: CPT

## 2020-07-24 NOTE — FLOWSHEET NOTE
[x] [facility] [address]  [address]  [phone]  F: [phone]        Physical Therapy Daily Treatment Note    Date:  2020  Patient Name:  Leilani Klein    :  1965   MRN: [id number]  Physician: Dario Sarabia MD      Insurance: [facility]  Approval was received for 72 visits, from 20 to 10/15/20. Authorization number [id number].  09741, 72 units  35020, 72 units  19669, 72 units  35888, 72 units  88285, 72 units  Medical Diagnosis: Amputee of R extremity Z89.9                   Rehab Codes: R26.2, R53.1, Z74.09  Onset Date:  2019                                  Next 's appt: Prosthetist  6/15/20  Medical Diagnosis: Amputee of extremity Z89.9                   Visit# / total visits: ; Progress note for Medicare patient due at visit 10    Cancels/No Shows: 0/0    Subjective:     Pain:  [] Yes  [x] No Location:  R LE  Pain Rating: (0-10 scale) 0/10  Pain altered Tx:  [x] No  [] Yes  Action:  Comments:    Nothing new to report. Objective:  Modalities: as needed. Precautions:  Exercises:  Completed exercises marked with X  Exercise  Right AKA Reps/ Time Weight/ Level  Comments   Nustep 5 min  L4 x No UE support, try Scifit   Sit to stand from mat 5x  x L LE fwd to promote R hip ext. Restarted           Sitting       Hip abd 15x blueberry x           Bridges  20x  x Restarted    SL hip abd  15x  x Restarted           Standing // bars    kait UE support   R 4 way hip SLR w/ iso abdom 2x10 ea 2 lb x    L 4 way hip SLR w/ iso abdom  15x 2 lbs x      Box Squats 2x15  x    March                      Gait training    Completed at inpatient rail today   FWD gait with rail         Side stepping   5 min     UE support   Retro stepping.   4ft x 3 x    Focusing on reciprocal gait   Gait with RW 10 min     Limited R UE handhold   Gait with quad cane 12 min   x Continue to stress stride length, foot clearance, endurance vs energy conservation. planning rest stops. Walking on/off green foam quad cane  4 ft x 4x    LOB 1x today that required min  assist to regain bal. Knee flexed in stance, added 7/10   Gait with quad cane plus carry  min     Carrying small blue ball in R hand   Mini hurdles 5 min       Stair climbing 5min     kait HR non reciprocal, added 6/3 / on inpatient stairs today          Balance // bars    NO UE to intermittent UE on HR  w/ gait belt   Tandem stance  min       Side stepping  15 ft x 2       Ball twists 10x ea 6\" ball  x Ball held out, rotating L/R to max rotational limits, ensuring weightshifting   Ball overhead press/low reach 10x 6\" ball  x Again pushing to low reach balance limits   Stepping over cones x   Attempted, unable to get R  hip flexion, held   D1/2 chops x ea       Lateral stepping              Other:     Treatment Charges: Mins Units   []  Modalities     [x]  Ther Exercise 23 1   []  Manual Therapy     []  Ther Activities     []  Aquatics     [x]  Gait 12 1   []  Other   X Neuro re-ed/balance 10 1   Total Treatment time  45 3     UNIT TOTAL as of today:    There Ex 30  Gait training 32  Neuromuscular Re-Ed  6    Assessment: [] Progressing toward goals. [] No change  [x] Other: Weak gluteals, restarted some mat exercises for gluteal strengthening. Pt fatigues easily. Education on importance of address HEP 1-2x daily. Pt demonstrated very short stride with gait and required 2 sitting rest breaks. Pt declined add'l wt bearing exercises due to increased pain in residual limb. Pt relies on UE and L LE for sit to standing. [x] Patient would continue to benefit from skilled physical therapy services in order to: improve hip and gluteal strength and progress static and dynamic standing balance and progress to cane then no device when appropriate. STG: (to be met in 10 treatments)  1. ? ROM:R hip extension to 20 degrees to improve gait.  Met  2. ? Strength: 5/5 R hip abduction and extension to

## 2020-07-29 ENCOUNTER — HOSPITAL ENCOUNTER (OUTPATIENT)
Dept: PHYSICAL THERAPY | Age: 55
Setting detail: THERAPIES SERIES
Discharge: HOME | End: 2020-07-29
Payer: MEDICAID

## 2020-07-29 PROCEDURE — 97116 GAIT TRAINING THERAPY: CPT

## 2020-07-29 PROCEDURE — 97110 THERAPEUTIC EXERCISES: CPT

## 2020-07-29 RX ORDER — LISINOPRIL 20 MG/1
20 TABLET ORAL DAILY
Qty: 90 TABLET | Refills: 5 | Status: SHIPPED | OUTPATIENT
Start: 2020-07-29 | End: 2021-10-19 | Stop reason: SDUPTHER

## 2020-07-29 RX ORDER — ATORVASTATIN CALCIUM 40 MG/1
40 TABLET, FILM COATED ORAL NIGHTLY
Qty: 30 TABLET | Refills: 3 | Status: SHIPPED | OUTPATIENT
Start: 2020-07-29 | End: 2020-12-08

## 2020-07-29 RX ORDER — INSULIN GLARGINE 100 [IU]/ML
INJECTION, SOLUTION SUBCUTANEOUS
Qty: 5 PEN | Refills: 2 | Status: SHIPPED | OUTPATIENT
Start: 2020-07-29 | End: 2020-10-05 | Stop reason: SDUPTHER

## 2020-07-29 RX ORDER — METOPROLOL TARTRATE 50 MG/1
50 TABLET, FILM COATED ORAL 2 TIMES DAILY
Qty: 180 TABLET | Refills: 5 | Status: SHIPPED | OUTPATIENT
Start: 2020-07-29 | End: 2021-10-19 | Stop reason: SDUPTHER

## 2020-07-29 NOTE — FLOWSHEET NOTE
[x] [facility]  [address]  [phone]  F: [phone]        Physical Therapy Daily Treatment Note    Date:  2020  Patient Name:  Bo Hodges    :  1965   MRN: [id number]  Physician: Jackelyn Francisco MD      Insurance: [facility]  Approval was received for 72 visits, from 20 to 10/15/20. Authorization number [id number].  08802, 72 units  42013, 72 units  93388, 72 units  13391, 72 units  00822, 72 units  Medical Diagnosis: Amputee of R extremity Z89.9                   Rehab Codes: R26.2, R53.1, Z74.09  Onset Date:  2019                                  Next 's appt: Prosthetist  6/15/20  Medical Diagnosis: Amputee of extremity Z89.9                   Visit# / total visits: ; Progress note for Medicare patient due at visit 10    Cancels/No Shows: 0/0    Subjective:     Pain:  [] Yes  [x] No Location:  R LE  Pain Rating: (0-10 scale) 0/10  Pain altered Tx:  [x] No  [] Yes  Action:  Comments:    Nothing new to report. Objective:  Modalities: as needed. Precautions:  Exercises:  Completed exercises marked with X  Exercise  Right AKA Reps/ Time Weight/ Level  Comments   Nustep 5 min  L4  No UE support, try Scifit   Sit to stand from mat 2x10x  x From chair with and without arms           Sitting       Hip abd 15x blueberry            Bridges  20x   Restarted    SL hip abd  15x   Restarted           Standing // bars    kait UE support   R 4 way hip SLR w/ iso abdom 2x10 ea 2 lb     L 4 way hip SLR w/ iso abdom  15x 2 lbs       Box Squats 2x15      March                      Gait training    Completed at inpatient rail today   FWD gait with rail         Side stepping   5 min     UE support   Retro stepping. 4ft x 3 x    Focusing on reciprocal gait   Gait with RW 10 min     Limited R UE handhold   Gait with quad cane 40 min   x Continue to stress stride length, foot clearance, endurance vs energy conservation. planning rest stops. Walking on/off green foam quad cane  4 ft x 4x    LOB 1x today that required min  assist to regain bal. Knee flexed in stance, added 7/10   Gait with quad cane plus carry  min     Carrying small blue ball in R hand   Mini hurdles 5 min       Stair climbing 5min     kait HR non reciprocal, added 6/3 / on inpatient stairs today          Balance // bars    NO UE to intermittent UE on HR  w/ gait belt   Tandem stance  min       Side stepping  15 ft x 2       Ball twists 10x ea 6\" ball   Ball held out, rotating L/R to max rotational limits, ensuring weightshifting   Ball overhead press/low reach 10x 6\" ball   Again pushing to low reach balance limits   Stepping over cones x   Attempted, unable to get R  hip flexion, held   D1/2 chops x ea       Lateral stepping              Other:     Treatment Charges: Mins Units   []  Modalities     [x]  Ther Exercise 15 1   []  Manual Therapy     []  Ther Activities     []  Aquatics     [x]  Gait 40 3   []  Other   X Neuro re-ed/balance     Total Treatment time  55 3     UNIT TOTAL as of today:    There Ex 32  Gait training 37  Neuromuscular Re-Ed  6    Assessment: [x] Progressing toward goals. Patient's gait tolerance is slowly improving, did require mod Assist x 1 during gait today after his foot dragged. Patient was able to perform sit to stands without UE support but his left quadriceps fatigues quickly. [] No change  [] Other:   [x] Patient would continue to benefit from skilled physical therapy services in order to: improve hip and gluteal strength and progress static and dynamic standing balance and progress to cane then no device when appropriate. STG: (to be met in 10 treatments)  1. ? ROM:R hip extension to 20 degrees to improve gait. Met  2. ? Strength: 5/5 R hip abduction and extension to improve R LE stability. Progress  3. ? Function: LEFS score to 70% functional or better to improve ADLs. Progress  4.  Independent with Home Exercise

## 2020-08-05 ENCOUNTER — HOSPITAL ENCOUNTER (OUTPATIENT)
Dept: PHYSICAL THERAPY | Age: 55
Setting detail: THERAPIES SERIES
Discharge: HOME | End: 2020-08-05
Payer: MEDICAID

## 2020-08-05 PROCEDURE — 97110 THERAPEUTIC EXERCISES: CPT

## 2020-08-05 PROCEDURE — 97116 GAIT TRAINING THERAPY: CPT

## 2020-08-05 NOTE — FLOWSHEET NOTE
[x] [facility]  [address]  [phone]  F: [phone]        Physical Therapy Daily Treatment Note    Date:  2020  Patient Name:  Barnie Libman    :  1965   MRN: [id number]  Physician: Dayanna Barry MD      Insurance: [facility]  Approval was received for 72 visits, from 20 to 10/15/20. Authorization number [id number].  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     Objective:  Modalities: as needed. Precautions:  Exercises:  Completed exercises marked with X  Exercise  Right AKA Reps/ Time Weight/ Level  Comments   Nustep 5 min  L4  No UE support, try Scifit   Sit to stand from mat 2x10x  x From chair with and without arms           Sitting       Hip abd 15x blueberry            Bridges  20x   Restarted    SL hip abd  15x   Restarted           Standing // bars    kait UE support   R 4 way hip SLR w/ iso abdom 2x10 ea 2 lb     L 4 way hip SLR w/ iso abdom  15x 2 lbs       Box Squats 2x15      March                      Gait training    Completed at inpatient rail today   FWD gait with rail         Side stepping   5 min     UE support   Retro stepping.   4ft x 3 x    Focusing on reciprocal gait   Gait with RW 10 min     Limited R UE handhold   Gait with quad cane 40 min   x Continue to stress stride length, foot clearance, endurance vs energy conservation. planning rest stops. Walking on/off green foam quad cane  4 ft x 4x    LOB 1x today that required min  assist to regain bal. Knee flexed in stance, added 7/10   Gait with quad cane plus carry  min     Carrying small blue ball in R hand   Mini hurdles 5 min       Stair climbing 5min     kait HR non reciprocal, added 6/3 / on inpatient stairs today          Balance // bars    NO UE to intermittent UE on HR  w/ gait belt   Tandem stance  min       Side stepping  15 ft x 2       Ball twists 10x ea 6\" ball   Ball held out, rotating L/R to max rotational limits, ensuring weightshifting   Ball overhead press/low reach 10x 6\" ball   Again pushing to low reach balance limits   Stepping over cones x   Attempted, unable to get R  hip flexion, held   D1/2 chops x ea       Lateral stepping              Other:     Treatment Charges: Mins Units   []  Modalities     [x]  Ther Exercise 15 1   []  Manual Therapy     []  Ther Activities     []  Aquatics     [x]  Gait 40 3   []  Other   X Neuro re-ed/balance     Total Treatment time  55 3     UNIT TOTAL as of today:    There Ex 33  Gait training 40  Neuromuscular Re-Ed  6    Assessment: [x] Progressing toward goals. Patient demonstrated improved balance during gait training, still had a few missteps but he was able to recover without assistance. Right swing phase appears more equal, no longer circumducting at his hip. [] No change  [] Other:   [x] Patient would continue to benefit from skilled physical therapy services in order to: improve hip and gluteal strength and progress static and dynamic standing balance and progress to cane then no device when appropriate. STG: (to be met in 10 treatments)  1. ? ROM:R hip extension to 20 degrees to improve gait. Met  2. ? Strength: 5/5 R hip abduction and extension to improve R LE stability. Progress  3. ? Function: LEFS score to 70% functional or better to improve ADLs.

## 2020-08-07 ENCOUNTER — HOSPITAL ENCOUNTER (OUTPATIENT)
Dept: PHYSICAL THERAPY | Age: 55
Setting detail: THERAPIES SERIES
Discharge: HOME | End: 2020-08-07
Payer: MEDICAID

## 2020-08-07 PROCEDURE — 97116 GAIT TRAINING THERAPY: CPT

## 2020-08-07 PROCEDURE — 97110 THERAPEUTIC EXERCISES: CPT

## 2020-08-07 NOTE — FLOWSHEET NOTE
stride length, foot clearance, endurance vs energy conservation. planning rest stops. Walking on/off green foam quad cane  4 ft x 4x    LOB 1x today that required min  assist to regain bal. Knee flexed in stance, added 7/10   Gait with quad cane plus carry  min     Carrying small blue ball in R hand   Mini hurdles 5 min       Stair climbing 5min     kait HR non reciprocal, added 6/3 / on inpatient stairs today          Balance // bars    NO UE to intermittent UE on HR  w/ gait belt   Tandem stance  min       Side stepping  15 ft x 2       Ball twists 10x ea 6\" ball   Ball held out, rotating L/R to max rotational limits, ensuring weightshifting   Ball overhead press/low reach 10x 6\" ball   Again pushing to low reach balance limits   Stepping over cones x   Attempted, unable to get R  hip flexion, held   D1/2 chops x ea       Lateral stepping 10 ft x 2  x           Other:     Treatment Charges: Mins Units   []  Modalities     [x]  Ther Exercise  20 2   []  Manual Therapy     []  Ther Activities     []  Aquatics     [x]  Gait 20 1   []  Other   X Neuro re-ed/balance     Total Treatment time 40 3     UNIT TOTAL as of today:    There Ex 35  Gait training 41  Neuromuscular Re-Ed  6    Assessment: [x] Progressing toward goals. Continues to  demonstrate improved static balance  and dynamic  balance during gait training. Able to self correct with minor balance challenges due to floor unlevel. Right swing phase appears more equal, no longer circumducting at his hip but short step length kait. [] No change  [] Other:   [x] Patient would continue to benefit from skilled physical therapy services in order to: improve hip and gluteal strength and progress static and dynamic standing balance and progress to cane then no device when appropriate. STG: (to be met in 10 treatments)  1. ? ROM:R hip extension to 20 degrees to improve gait. Met  2. ? Strength: 5/5 R hip abduction and extension to improve R LE stability. Progress  3. ? Function: LEFS score to 70% functional or better to improve ADLs. Progress  4. Independent with Home Exercise Programs Met     LTG: (to be met in 30 treatments)  1. Patient will be able to ambulate community distances with straight cane. Not Met  2. Patient is able to demonstrate fair standing balance without RW. Met.      Patient goals: improve ability to ambulate, stay independent. Pt. Education:  [x] Yes  [] No  [x] Reviewed Prior HEP/Ed  Method of Education: [x] Verbal  [x] Demo  [] Written  Comprehension of Education:  [x] Verbalizes understanding. [x] Demonstrates understanding. [] Needs review. [x] Demonstrates/verbalizes HEP/Ed previously given. Plan: [x] Continue current frequency toward long and short term goals. (Rides are set up through 8/28)  [x] Specific Instructions for subsequent treatments: Continue to reduce assistance with Quad cane.        Time In: 1100       Time Out:   1145     Electronically signed by:  Grant Navarro PTA

## 2020-08-12 ENCOUNTER — HOSPITAL ENCOUNTER (OUTPATIENT)
Dept: PHYSICAL THERAPY | Age: 55
Setting detail: THERAPIES SERIES
Discharge: HOME | End: 2020-08-12
Payer: MEDICAID

## 2020-08-12 PROCEDURE — 97110 THERAPEUTIC EXERCISES: CPT

## 2020-08-12 PROCEDURE — 97116 GAIT TRAINING THERAPY: CPT

## 2020-08-12 NOTE — FLOWSHEET NOTE
[x] [facility]  [address]  [phone]  F: [phone]        Physical Therapy Daily Treatment Note    Date:  2020  Patient Name:  J Carlos Infante    :  1965   MRN: [id number]  Physician: Mirza Connolly MD      Insurance: [facility]  Approval was received for 72 visits, from 20 to 10/15/20. Authorization number [id number].  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 Objective:  Modalities: as needed. Precautions:  Exercises:  Completed exercises marked with X  Exercise  Right AKA Reps/ Time Weight/ Level  Comments   Nustep 5 min  L4  No UE support, try Scifit   Sit to stand from mat 1x10x   x From chair with and without arms           Sitting       Hip abd 15x blueberry            Bridges  20x       SL hip abd  15x             Standing // bars    kait UE support   R 4 way hip SLR w/ iso abdom 2x10 ea 2 lb     L 4 way hip SLR w/ iso abdom 10x2 2 lbs x      Box Squats 2x15       March                      Gait training    Completed at inpatient rail today   FWD gait with rail         Side stepping   5 min     UE support   Retro stepping. 4ft x 3 x    Focusing on reciprocal gait   Gait with RW 10 min     Limited R UE handhold   Gait with quad cane 40 min   x Continue to stress stride length, foot clearance, endurance vs energy conservation.  planning rest stops. Walking on/off green foam quad cane  4 ft x 4x    LOB 1x today that required min  assist to regain bal. Knee flexed in stance, added 7/10   Gait with quad cane plus carry  min     Carrying small blue ball in R hand   Mini hurdles 5 min       Stair climbing 5min     kait HR non reciprocal, added 6/3 / on inpatient stairs today          Balance // bars    NO UE to intermittent UE on HR  w/ gait belt   Tandem stance  min       Side stepping  15 ft x 2       Ball twists 10x ea 6\" ball   Ball held out, rotating L/R to max rotational limits, ensuring weightshifting   Ball overhead press/low reach 10x 6\" ball   Again pushing to low reach balance limits   Stepping over cones x   Attempted, unable to get R  hip flexion, held   D1/2 chops x ea       Lateral stepping 10 ft x 2  x           Other:     Treatment Charges: Mins Units   []  Modalities     [x]  Ther Exercise  15 1   []  Manual Therapy     []  Ther Activities     []  Aquatics     [x]  Gait 30 2   []  Other   X Neuro re-ed/balance     Total Treatment time 45 3     UNIT TOTAL as of today:    There Ex 36  Gait training 43  Neuromuscular Re-Ed  6    Assessment: [x] Progressing toward goals. Pt had a few times today where he had slight LOB while ambulating with quad cane around clinic. Pt was able to adjust without needing assistance. Pt needed frequent rest breaks during gait training around the clinic. Pt visibly fatigued at end of treatment. [] No change  [] Other:   [x] Patient would continue to benefit from skilled physical therapy services in order to: improve hip and gluteal strength and progress static and dynamic standing balance and progress to cane then no device when appropriate. STG: (to be met in 10 treatments)  1. ? ROM:R hip extension to 20 degrees to improve gait. Met  2. ? Strength: 5/5 R hip abduction and extension to improve R LE stability. Progress  3. ? Function: LEFS score to 70% functional or better to improve ADLs. Progress  4. Independent with Home Exercise Programs Met     LTG: (to be met in 30 treatments)  1. Patient will be able to ambulate community distances with straight cane. Not Met  2. Patient is able to demonstrate fair standing balance without RW. Met.      Patient goals: improve ability to ambulate, stay independent. Pt. Education:  [x] Yes  [] No  [x] Reviewed Prior HEP/Ed  Method of Education: [x] Verbal  [x] Demo  [] Written  Comprehension of Education:  [x] Verbalizes understanding. [x] Demonstrates understanding. [] Needs review. [x] Demonstrates/verbalizes HEP/Ed previously given. Plan: [x] Continue current frequency toward long and short term goals. (Rides are set up through 8/28)  [x] Specific Instructions for subsequent treatments: Continue to reduce assistance with Quad cane.        Time In: 8386Q       Time Out: 8500X    Electronically signed by:  Suzanne Dunn PTA

## 2020-08-14 ENCOUNTER — HOSPITAL ENCOUNTER (OUTPATIENT)
Dept: PHYSICAL THERAPY | Age: 55
Setting detail: THERAPIES SERIES
Discharge: HOME | End: 2020-08-14
Payer: MEDICAID

## 2020-08-14 PROCEDURE — 97116 GAIT TRAINING THERAPY: CPT

## 2020-08-14 PROCEDURE — 97110 THERAPEUTIC EXERCISES: CPT

## 2020-08-14 NOTE — FLOWSHEET NOTE
[x] [facility]  [address]  [phone]  F: [phone]        Physical Therapy Daily Treatment Note    Date:  2020  Patient Name:  Pura Dyer    :  1965   MRN: [id number]  Physician: Isabel Tavares MD      Insurance: [facility]  Approval was received for 72 visits, from 20 to 10/15/20. Authorization number [id number].  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 Pt forgot extra socks to prevent slipping. Pt amb to department with RW without difficulty. Objective:  Modalities: as needed. Precautions:  Exercises:  Completed exercises marked with X  Exercise  Right AKA Reps/ Time Weight/ Level  Comments   Nustep 5 min  L4  No UE support, try Scifit   Sit to stand from mat 1x10x   x From chair with and without arms           Sitting       Hip abd 15x blueberry            Bridges  20x       SL hip abd  15x             Standing // bars    kait UE support   R 4 way hip SLR w/ iso abdom 2x10 ea 2 lb     L 4 way hip SLR w/ iso abdom 10x2 2 lbs x      Box Squats 2x15       March  15x   Added                  Gait training    Completed at inpatient rail today   FWD gait with rail         Side stepping   5 min     UE support   Retro stepping.   4ft x 3 x    Focusing on reciprocal gait   Gait with RW 10 Progress  3. ? Function: LEFS score to 70% functional or better to improve ADLs. Progress  4. Independent with Home Exercise Programs Met     LTG: (to be met in 30 treatments)  1. Patient will be able to ambulate community distances with straight cane. Not Met  2. Patient is able to demonstrate fair standing balance without RW. Met.      Patient goals: improve ability to ambulate, stay independent. Pt. Education:  [x] Yes  [] No  [x] Reviewed Prior HEP/Ed  Method of Education: [x] Verbal  [x] Demo  [] Written  Comprehension of Education:  [x] Verbalizes understanding. [x] Demonstrates understanding. [] Needs review. [x] Demonstrates/verbalizes HEP/Ed previously given. Plan: [x] Continue current frequency toward long and short term goals. (Rides are set up through 8/28)  [x] Specific Instructions for subsequent treatments: Continue to reduce assistance with Quad cane.        Time In: 9239C       Time Out: 4389F    Electronically signed by:  Prisca Moran PTA

## 2020-08-19 ENCOUNTER — HOSPITAL ENCOUNTER (OUTPATIENT)
Dept: PHYSICAL THERAPY | Age: 55
Setting detail: THERAPIES SERIES
Discharge: HOME | End: 2020-08-19
Payer: MEDICAID

## 2020-08-19 PROCEDURE — 97116 GAIT TRAINING THERAPY: CPT

## 2020-08-19 PROCEDURE — 97112 NEUROMUSCULAR REEDUCATION: CPT

## 2020-08-19 NOTE — FLOWSHEET NOTE
[x] [facility]  [address]  [phone]  F: [phone]        Physical Therapy Daily Treatment Note    Date:  2020  Patient Name:  Barnie Libman    :  1965   MRN: [id number]  Physician: Dayanna Barry MD      Insurance: [facility]  Approval was received for 72 visits, from 20 to 10/15/20. Authorization number [id number].  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 Objective:  Modalities: as needed. Precautions:  Exercises:  Completed exercises marked with X  Exercise  Right AKA Reps/ Time Weight/ Level  Comments   Nustep 5 min  L4  No UE support, try Scifit   Sit to stand from mat 1x10x    From chair with and without arms           Sitting       Hip abd 15x blueberry            Bridges  20x       SL hip abd  15x             Standing // bars    kait UE support   R 4 way hip SLR w/ iso abdom 2x10 ea 2 lb     L 4 way hip SLR w/ iso abdom 10x2 2 lbs       Box Squats 2x15       March  15x   Added                  Gait training    Completed at inpatient rail today   FWD gait with rail         Side stepping   5 min     UE support   Retro stepping. 4ft x 3 x    Focusing on reciprocal gait   Gait with RW 10 min     Limited R UE handhold   Gait with quad cane 45 min   x Continue to stress stride length, foot clearance, endurance vs energy conservation. planning rest stops.     Walking on/off green Progress  4. Independent with Home Exercise Programs Met     LTG: (to be met in 30 treatments)  1. Patient will be able to ambulate community distances with straight cane. Not Met  2. Patient is able to demonstrate fair standing balance without RW. Met.      Patient goals: improve ability to ambulate, stay independent. Pt. Education:  [x] Yes  [] No  [x] Reviewed Prior HEP/Ed  Method of Education: [x] Verbal  [x] Demo  [] Written  Comprehension of Education:  [x] Verbalizes understanding. [x] Demonstrates understanding. [] Needs review. [x] Demonstrates/verbalizes HEP/Ed previously given. Plan: [x] Continue current frequency toward long and short term goals.   (Rides are set up through 8/28)  [x] Specific Instructions for subsequent treatments: Try Ramps      Time In: 1100       Time Out: 1200    Electronically signed by:  Macho Collins, PT

## 2020-08-21 ENCOUNTER — HOSPITAL ENCOUNTER (OUTPATIENT)
Dept: PHYSICAL THERAPY | Age: 55
Setting detail: THERAPIES SERIES
Discharge: HOME | End: 2020-08-21
Payer: MEDICAID

## 2020-08-21 PROCEDURE — 97116 GAIT TRAINING THERAPY: CPT

## 2020-08-21 NOTE — FLOWSHEET NOTE
stride length, foot clearance, endurance vs energy conservation. planning rest stops. Walking on/off green foam w/ quad cane in // bars 6 ft x 4x  x    One UE use. Gait with quad cane plus carry  min     Carrying small blue ball in R hand   Mini hurdles 5 min       Stair climbing 5 min     kait HR non reciprocal, added 6/3 / on inpatient stairs today   Ramp amb w/ quad cane  15 mins  x CGA, Verbal cues for step length with ascending vs descending. No LOB. Balance // bars    NO UE to intermittent UE on HR  w/ gait belt   Tandem stance  min       Side stepping  15 ft x 2       Ball twists 10x ea 6\" ball   Ball held out, rotating L/R to max rotational limits, ensuring weightshifting   Ball overhead press/low reach 10x 6\" ball   Again pushing to low reach balance limits   Stepping over cones x   Attempted, unable to get R  hip flexion, held   D1/2 chops x ea       Lateral stepping 10 ft x 2      Amb w/o UE support 10 min   x    Other:     Treatment Charges: Mins Units   []  Modalities     []  Ther Exercise     []  Manual Therapy     []  Ther Activities     []  Aquatics     [x]  Gait  55 4   []  Other   X Neuro re-ed/balance     Total Treatment time  55 4     UNIT TOTAL as of today:    There Ex 37  Gait training 49  Neuromuscular Re-Ed  6    Assessment: [x] Progressing toward goals added ambulation on ramp incline/declined with education on stride. Pt demonstrate good understanding and no LOB. Pt did how ever loose his balance two times walking on level surfaces with quad cane at end of session that he was able to self recover via reaching out to wall along with CGA. LOB was due to fatigue/catching his toe on jayshree. Pt only took one rest break with today's session.        [] No change  [] Other:   [x] Patient would continue to benefit from skilled physical therapy services in order to: improve hip and gluteal strength and progress static and dynamic standing balance and progress to cane then no device when appropriate. STG: (to be met in 10 treatments)  1. ? ROM:R hip extension to 20 degrees to improve gait. Met  2. ? Strength: 5/5 R hip abduction and extension to improve R LE stability. Progress  3. ? Function: LEFS score to 70% functional or better to improve ADLs. Progress  4. Independent with Home Exercise Programs Met     LTG: (to be met in 30 treatments)  1. Patient will be able to ambulate community distances with straight cane. Not Met  2. Patient is able to demonstrate fair standing balance without RW. Met.      Patient goals: improve ability to ambulate, stay independent. Pt. Education:  [x] Yes  [] No  [x] Reviewed Prior HEP/Ed  Method of Education: [x] Verbal  [x] Demo  [] Written  Comprehension of Education:  [x] Verbalizes understanding. [x] Demonstrates understanding. [] Needs review. [x] Demonstrates/verbalizes HEP/Ed previously given. Plan: [x] Continue current frequency toward long and short term goals.   (Rides are set up through 8/28)  [x] Specific Instructions for subsequent treatments: Try Ramps      Time In: 1100       Time Out: 0463     Electronically signed by:  Ranulfo Canada PTA

## 2020-08-24 RX ORDER — GABAPENTIN 300 MG/1
300 CAPSULE ORAL EVERY 8 HOURS
Qty: 90 CAPSULE | Refills: 0 | Status: SHIPPED | OUTPATIENT
Start: 2020-08-24 | End: 2020-09-29 | Stop reason: SDUPTHER

## 2020-08-26 ENCOUNTER — HOSPITAL ENCOUNTER (OUTPATIENT)
Dept: PHYSICAL THERAPY | Age: 55
Setting detail: THERAPIES SERIES
Discharge: HOME | End: 2020-08-26
Payer: MEDICAID

## 2020-08-26 PROCEDURE — 97116 GAIT TRAINING THERAPY: CPT

## 2020-08-26 PROCEDURE — 97530 THERAPEUTIC ACTIVITIES: CPT

## 2020-08-26 NOTE — FLOWSHEET NOTE
[x] [facility] [address] [facility]  [address]  [phone]  F: [phone]        Physical Therapy Daily Treatment Note    Date:  2020  Patient Name:  Bo Hodges    :  1965   MRN: [id number]  Physician: Jackelyn Francisco MD      Insurance: [facility]  Approval was received for 72 visits, from 20 to 10/15/20. Authorization number [id number].  28018, 72 units  65803, 72 units  32616, 72 units  55326, 72 units  95281, 72 units  Medical Diagnosis: Amputee of R extremity Z89.9                   Rehab Codes: R26.2, R53.1, Z74.09  Onset Date:  2019                                  Next 's appt:             Visit# / total visits: ; Progress note for Medicare patient due at visit 10    Cancels/No Shows: 0/0    Subjective:     Pain:  [] Yes  [x] No Location:  R LE  Pain Rating: (0-10 scale) 0/10  Pain altered Tx:  [x] No  [] Yes  Action:  Comments:  Patient doing well, believes he is ready to D/C from PT this week and continue improving his gait on his own. Patient does note he may have issues transferring into a truck or [employer] with a quad cane. Objective:  Modalities: as needed. Precautions:  Exercises:  Completed exercises marked with X  Exercise  Right AKA Reps/ Time Weight/ Level  Comments   L calf stretch belt 3x30\"   Added due to pt complaint           Nustep 5 min  L4  No UE support, try Scifit   Sit to stand from mat 1x10x    From chair with and without arms           Sitting       Hip abd 15x blueberry            Bridges  20x       SL hip abd  15x             Standing // bars    kait UE support   R 4 way hip SLR w/ iso abdom 2x10 ea 2 lb     L 4 way hip SLR w/ iso abdom 10x2 2 lbs       Box Squats 2x15       March  15x   Added           \"truck\" Transfers 15 min    Used high chair and 16 inch box to simulate climbing in and out of truck with [employer] cane, Independent with RW.  .           Gait training    Completed at inpatient rail today FWD gait with rail         Side stepping   5 min     UE support   Retro stepping. 4ft x 3 x    Focusing on reciprocal gait   Gait with RW 10 min     Limited R UE handhold   Gait with quad cane 40 min   x Continue to stress stride length, foot clearance, endurance vs energy conservation. planning rest stops. Walking on/off green foam w/ quad cane in // bars 6 ft x 4x  x   One UE use. Gait with quad cane plus carry  min     Carrying small blue ball in R hand   Mini hurdles 5 min       Stair climbing 5 min     kait HR non reciprocal, added 6/3 / on inpatient stairs today   Ramp amb w/ quad cane  15 mins   CGA, Verbal cues for step length with ascending vs descending. No LOB. Balance // bars    NO UE to intermittent UE on HR  w/ gait belt   Tandem stance  min       Side stepping  15 ft x 2       Ball twists 10x ea 6\" ball   Ball held out, rotating L/R to max rotational limits, ensuring weightshifting   Ball overhead press/low reach 10x 6\" ball   Again pushing to low reach balance limits   Stepping over cones x   Attempted, unable to get R  hip flexion, held   D1/2 chops x ea       Lateral stepping 10 ft x 2      Amb w/o UE support 10 min       Other:     Treatment Charges: Mins Units   []  Modalities     [x]  Ther Exercise     []  Manual Therapy     [x]  Ther Activities 15 1   []  Aquatics     [x]  Gait  40 3   []  Other   X Neuro re-ed/balance     Total Treatment time  55 4     UNIT TOTAL as of today:    There Ex 37  Gait training 46  Neuromuscular Re-Ed  6  Therapeutic Activity 71     Assessment: [x] Progressing toward goals. Patient's right stride is very close to same distance as his left. Patient dd not require any assistance during ambulation. Patient demonstrated good left LE and UE strength during simulated truck transfers. He is unable to use his quad cane in the same manner as his walker due to the lack of support.  Advised patient to attempt actual transfers into his brother's truck with the

## 2020-08-28 ENCOUNTER — HOSPITAL ENCOUNTER (OUTPATIENT)
Dept: PHYSICAL THERAPY | Age: 55
Setting detail: THERAPIES SERIES
Discharge: HOME | End: 2020-08-28
Payer: MEDICAID

## 2020-08-28 PROCEDURE — 97112 NEUROMUSCULAR REEDUCATION: CPT

## 2020-08-28 PROCEDURE — 97116 GAIT TRAINING THERAPY: CPT

## 2020-08-28 PROCEDURE — 97110 THERAPEUTIC EXERCISES: CPT

## 2020-08-28 NOTE — FLOWSHEET NOTE
[x] [facility] [address]  [address]  [phone]  F: [phone]        Physical Therapy Daily Treatment Note    Date:  2020  Patient Name:  Eugene Place    :  1965   MRN: [id number]  Physician: Nasir Angel MD      Insurance: [facility]  Approval was received for 72 visits, from 20 to 10/15/20. Authorization number [id number].  14188, 72 units  03956, 72 units  34983, 72 units  90439, 72 units  34110, 72 units  Medical Diagnosis: Amputee of R extremity Z89.9                   Rehab Codes: R26.2, R53.1, Z74.09  Onset Date:  2019                                  Next 's appt:             Visit# / total visits: ; Progress note for Medicare patient due at visit 10    Cancels/No Shows: 0/0    Subjective:     Pain:  [] Yes  [x] No Location:  R LE  Pain Rating: (0-10 scale) 0/10  Pain altered Tx:  [x] No  [] Yes  Action:  Comments: Without complaints. Reports he is ready for discharge and he will continue to work with cane at home and exercises. States he will start to walk in community with cane. Objective:  Modalities: as needed. Precautions:  Exercises:  Completed exercises marked with X  Exercise  Right AKA Reps/ Time Weight/ Level  Comments   L calf stretch belt 3x30\"              Nustep 5 min  L4  No UE support, try Scifit   Sit to stand   1x10    x From chair with and without arms           Sitting       Hip abd 15x blueberry            Bridges  20x       SL hip abd  15x             Standing // bars    kait UE support   R 4 way hip SLR w/ iso abdom 2x10 ea 2 lb     L 4 way hip SLR w/ iso abdom 10x2 2 lbs       Box Squats 2x15       March  15x              \"truck\" Transfers 15 min    Used high chair and 16 inch box to simulate climbing in and out of truck with [employer] cane, Independent with RW.  .           Gait training    Completed at inpatient rail today   FWD gait with rail         Side stepping  6 ft x 4   x     Retro foot on ground but he demonstrates self recovery. Short rest breaks resolve fatigue. Good quad work with sit to stand demonstrated. [] No change  [] Other:   [x] Patient would continue to benefit from skilled physical therapy services in order to: improve hip and gluteal strength and progress static and dynamic standing balance and progress to cane then no device when appropriate. STG: (to be met in 10 treatments)  1. ? ROM:R hip extension to 20 degrees to improve gait. Met  2. ? Strength: 5/5 R hip abduction and extension to improve R LE stability. Progress  3. ? Function: LEFS score to 70% functional or better to improve ADLs. Progress  4. Independent with Home Exercise Programs Met     LTG: (to be met in 30 treatments)  1. Patient will be able to ambulate community distances with straight cane. Not Met  2. Patient is able to demonstrate fair standing balance without RW. Met.      Patient goals: improve ability to ambulate, stay independent. Pt. Education:  [x] Yes  [] No  [x] Reviewed Prior HEP/Ed  Method of Education: [x] Verbal  [x] Demo  [] Written  Comprehension of Education:  [x] Verbalizes understanding. [x] Demonstrates understanding. [] Needs review. [x] Demonstrates/verbalizes HEP/Ed previously given. Plan: [x] Continue current frequency toward long and short term goals. (Rides are set up through 8/28)  [x] Specific Instructions for subsequent treatments: Give final HEP as needed, Give LEFS.       Time In: 1100       Time Out: 1155    Electronically signed by:  Angella Lopes PTA

## 2020-08-31 NOTE — DISCHARGE SUMMARY
STG: (to be met in 10 treatments)  1. ? ROM:R hip extension to 20 degrees to improve gait. Met  2. ? Strength: 5/5 R hip abduction and extension to improve R LE stability. Met  3. ? Function: LEFS score to 70% functional or better to improve ADLs.  Progress  4. Independent with Home Exercise Programs Met     LTG: (to be met in 30 treatments)  1. Patient will be able to ambulate community distances with straight cane. Met  2. Patient is able to demonstrate fair standing balance without RW. Met. Treatment to Date:  [x] Therapeutic Exercise    [] Modalities:  [] Therapeutic Activity     [] Ultrasound  [] Electrical Stimulation  [x] Gait Training     [] Massage       [] Lumbar/Cervical Traction  [x] Neuromuscular Re-education [] Cold/hotpack [] Iontophoresis: 4 mg/mL  [x] Instruction in Home Exercise Program                     Dexamethasone Sodium  [] Manual Therapy             Phosphate 40-80 mAmin  [] Aquatic Therapy                   [] Vasocompression/    [] Other:             Game Ready    Discharge Status:     [] Pt recovered from conditions. Treatment goals were met. [] Pt received maximum benefit. No further therapy indicated at this time. [x] Pt to continue exercise/home instructions independently. [] Therapy interrupted due to:    [] Pt has 2 or more no shows/cancels, is discontinued per our policy. [] Pt has completed prescribed number of treatment sessions. [] Other:         Electronically signed by Yocasta Navarro PT on 8/31/2020 at 2:05 PM      If you have any questions or concerns, please don't hesitate to call.   Thank you for your referral.

## 2020-09-29 RX ORDER — GABAPENTIN 300 MG/1
300 CAPSULE ORAL EVERY 8 HOURS
Qty: 90 CAPSULE | Refills: 0 | Status: SHIPPED | OUTPATIENT
Start: 2020-09-29 | End: 2020-11-16 | Stop reason: SDUPTHER

## 2020-09-29 NOTE — TELEPHONE ENCOUNTER
Patient request for Gabapentin 300 MG and insulin syringe needles. Please review and e-scribe if applicable. Last Visit Date: 04/14/2020  Next Visit Date:  Visit date not found    Hemoglobin A1C (%)   Date Value   12/02/2019 6.6   08/20/2019 10.0 (H)   07/18/2019 8.4             ( goal A1C is < 7)   Microalb/Crt.  Ratio (mcg/mg creat)   Date Value   12/02/2019 CANNOT BE CALCULATED     LDL Cholesterol (mg/dL)   Date Value   04/17/2019 22       (goal LDL is <100)   AST (U/L)   Date Value   05/04/2019 26     ALT (U/L)   Date Value   05/04/2019 43 (H)     BUN (mg/dL)   Date Value   08/30/2019 9     BP Readings from Last 3 Encounters:   06/08/20 135/74   03/11/20 (!) 184/77   12/02/19 (!) 153/78          (goal 120/80)        Patient Active Problem List:     PAD (peripheral artery disease) (HCC)     Subacute osteomyelitis of right tibia (HCC)     Metabolic encephalopathy     Abnormal CT of the head     Hepatitis     Acute cerebral infarction associated with systemic hypoxia or ischemia (HCC)     Multi infarct state     Carotid stenosis, bilateral     Right foot drop     Thrombocytosis (HCC)     Acute CVA (cerebrovascular accident) ([address]     Moderate malnutrition ([address]     Cellulitis of right lower extremity     Right-sided extracranial carotid artery stenosis     Essential hypertension     Type 2 diabetes mellitus with diabetic neuropathy, without long-term current use of insulin (HCC)     Leukocytosis     Status post angiography of extremity     Ischemic gangrene ([address]     Pyogenic inflammation of bone (HCC)     Coronary artery disease     Above knee amputation of right lower extremity ([address]      MA

## 2020-10-05 RX ORDER — INSULIN GLARGINE 100 [IU]/ML
INJECTION, SOLUTION SUBCUTANEOUS
Qty: 5 PEN | Refills: 2 | Status: SHIPPED | OUTPATIENT
Start: 2020-10-05 | End: 2020-11-09 | Stop reason: CLARIF

## 2020-10-05 NOTE — TELEPHONE ENCOUNTER
Laura Request for [employer]. Last Visit Date: 4/14/2020  Next Visit Date:  Future Appointments   Date Time Provider Katelyn Kulkarni   10/29/2020 11:00 AM Antonio Ramírez MD [facility] Maintenance   Topic Date Due    Hepatitis C screen  1965    Diabetic retinal exam  06/08/1974    HIV screen  06/08/1979    Hepatitis B vaccine (1 of 3 - Risk 3-dose series) 06/08/1983    Colon cancer screen colonoscopy  06/08/2014    Shingles Vaccine (2 of 2) 01/03/2020    Lipid screen  04/17/2020    Flu vaccine (1) 09/01/2020    Diabetic foot exam  09/27/2020    Potassium monitoring  09/28/2020    Creatinine monitoring  09/28/2020    A1C test (Diabetic or Prediabetic)  12/02/2020    Diabetic microalbuminuria test  12/02/2020    DTaP/Tdap/Td vaccine (2 - Td) 11/11/2025    Pneumococcal 0-64 years Vaccine  Completed    Hepatitis A vaccine  Aged Out    Hib vaccine  Aged Out    Meningococcal (ACWY) vaccine  Aged Out       Hemoglobin A1C (%)   Date Value   12/02/2019 6.6   08/20/2019 10.0 (H)   07/18/2019 8.4             ( goal A1C is < 7)   Microalb/Crt.  Ratio (mcg/mg creat)   Date Value   12/02/2019 CANNOT BE CALCULATED     LDL Cholesterol (mg/dL)   Date Value   04/17/2019 22       (goal LDL is <100)   AST (U/L)   Date Value   05/04/2019 26     ALT (U/L)   Date Value   05/04/2019 43 (H)     BUN (mg/dL)   Date Value   08/30/2019 9     BP Readings from Last 3 Encounters:   06/08/20 135/74   03/11/20 (!) 184/77   12/02/19 (!) 153/78          (goal 120/80)    All Future Testing planned in [facility]  Lab Frequency Next Occurrence   Cologuard (For External Results Only) Once 10/10/2020   VL ARTERIAL PVR LOWER WO EXERCISE Once 06/14/2021       Next Visit Date:  Future Appointments   Date Time Provider Katelyn Kulkarni   10/29/2020 11:00 AM Antonio Ramírez MD [facility]         Patient Active Problem List:     PAD (peripheral artery disease) ([address]     Subacute osteomyelitis of right tibia ([address] Metabolic encephalopathy     Abnormal CT of the head     Hepatitis     Acute cerebral infarction associated with systemic hypoxia or ischemia (HCC)     Multi infarct state     Carotid stenosis, bilateral     Right foot drop     Thrombocytosis (HCC)     Acute CVA (cerebrovascular accident) ([address]     Moderate malnutrition ([address]     Cellulitis of right lower extremity     Right-sided extracranial carotid artery stenosis     Essential hypertension     Type 2 diabetes mellitus with diabetic neuropathy, without long-term current use of insulin (HCC)     Leukocytosis     Status post angiography of extremity     Ischemic gangrene ([address]     Pyogenic inflammation of bone ([address]     Coronary artery disease     Above knee amputation of right lower extremity ([address]

## 2020-10-14 ENCOUNTER — TELEPHONE (OUTPATIENT)
Dept: FAMILY MEDICINE CLINIC | Age: 55
End: 2020-10-14

## 2020-10-28 NOTE — TELEPHONE ENCOUNTER
Request Reference Number: [id number]. Elsie Worrell [address] is denied for not meeting the prior authorization requirement(s). For further questions, call [facility] at [phone] for more information.

## 2020-11-09 RX ORDER — INSULIN DEGLUDEC INJECTION 100 U/ML
INJECTION, SOLUTION SUBCUTANEOUS
OUTPATIENT
Start: 2020-11-09

## 2020-11-09 RX ORDER — INSULIN DETEMIR 100 [IU]/ML
INJECTION, SOLUTION SUBCUTANEOUS NIGHTLY
Qty: 5 PEN | Refills: 3 | OUTPATIENT
Start: 2020-11-09

## 2020-11-09 RX ORDER — INSULIN GLARGINE 100 [IU]/ML
24 INJECTION, SOLUTION SUBCUTANEOUS NIGHTLY
Qty: 5 PEN | Refills: 3 | Status: SHIPPED | OUTPATIENT
Start: 2020-11-09 | End: 2021-11-09 | Stop reason: SDUPTHER

## 2020-11-16 RX ORDER — GABAPENTIN 300 MG/1
300 CAPSULE ORAL EVERY 8 HOURS
Qty: 90 CAPSULE | Refills: 0 | Status: SHIPPED | OUTPATIENT
Start: 2020-11-16 | End: 2021-01-05

## 2020-11-16 RX ORDER — CLOPIDOGREL BISULFATE 75 MG/1
TABLET ORAL
Qty: 30 TABLET | Refills: 3 | Status: SHIPPED | OUTPATIENT
Start: 2020-11-16 | End: 2021-03-29

## 2020-11-16 RX ORDER — GABAPENTIN 300 MG/1
CAPSULE ORAL
Qty: 90 CAPSULE | Refills: 0 | OUTPATIENT
Start: 2020-11-16

## 2020-11-16 NOTE — TELEPHONE ENCOUNTER
Laura Request for pending medications. Last Visit Date: 4/14/2020  Next Visit Date:  Future Appointments   Date Time Provider Katelyn Kulkarni   12/3/2020 11:00 AM Alessia Bowles  [facility] Maintenance   Topic Date Due    Hepatitis C screen  1965    Diabetic retinal exam  06/08/1974    HIV screen  06/08/1979    Hepatitis B vaccine (1 of 3 - Risk 3-dose series) 06/08/1983    Colon cancer screen colonoscopy  06/08/2014    Shingles Vaccine (2 of 2) 01/03/2020    Lipid screen  04/17/2020    Flu vaccine (1) 09/01/2020    Diabetic foot exam  09/27/2020    Potassium monitoring  09/28/2020    Creatinine monitoring  09/28/2020    A1C test (Diabetic or Prediabetic)  12/02/2020    Diabetic microalbuminuria test  12/02/2020    DTaP/Tdap/Td vaccine (2 - Td) 11/11/2025    Pneumococcal 0-64 years Vaccine  Completed    Hepatitis A vaccine  Aged Out    Hib vaccine  Aged Out    Meningococcal (ACWY) vaccine  Aged Out       Hemoglobin A1C (%)   Date Value   12/02/2019 6.6   08/20/2019 10.0 (H)   07/18/2019 8.4             ( goal A1C is < 7)   Microalb/Crt.  Ratio (mcg/mg creat)   Date Value   12/02/2019 CANNOT BE CALCULATED     LDL Cholesterol (mg/dL)   Date Value   04/17/2019 22       (goal LDL is <100)   AST (U/L)   Date Value   05/04/2019 26     ALT (U/L)   Date Value   05/04/2019 43 (H)     BUN (mg/dL)   Date Value   08/30/2019 9     BP Readings from Last 3 Encounters:   06/08/20 135/74   03/11/20 (!) 184/77   12/02/19 (!) 153/78          (goal 120/80)    All Future Testing planned in [facility]  Lab Frequency Next Occurrence   Cologuard (For External Results Only) Once 12/02/2020   VL ARTERIAL PVR LOWER WO EXERCISE Once 06/14/2021       Next Visit Date:  Future Appointments   Date Time Provider Katelyn Kulkarni   12/3/2020 11:00 AM Alessia Bowles MD [address]         Patient Active Problem List:     PAD (peripheral artery disease) ([address]     Subacute osteomyelitis of right tibia [facility])     Metabolic encephalopathy     Abnormal CT of the head     Hepatitis     Acute cerebral infarction associated with systemic hypoxia or ischemia (HCC)     Multi infarct state     Carotid stenosis, bilateral     Right foot drop     Thrombocytosis (HCC)     Acute CVA (cerebrovascular accident) ([address]     Moderate malnutrition ([address]     Cellulitis of right lower extremity     Right-sided extracranial carotid artery stenosis     Essential hypertension     Type 2 diabetes mellitus with diabetic neuropathy, without long-term current use of insulin (HCC)     Leukocytosis     Status post angiography of extremity     Ischemic gangrene ([address]     Pyogenic inflammation of bone ([address]     Coronary artery disease     Above knee amputation of right lower extremity ([address]

## 2020-12-04 NOTE — TELEPHONE ENCOUNTER
Last visit: 04/14/20  Last Med refill:   Does patient have enough medication for 72 hours:     Next Visit Date:  Future Appointments   Date Time Provider Katelyn Omeri   12/10/2020 10:30 AM Jasmina Darden MD [address] [place] [address] Maintenance   Topic Date Due    Hepatitis C screen  1965    Diabetic retinal exam  06/08/1974    HIV screen  06/08/1979    Hepatitis B vaccine (1 of 3 - Risk 3-dose series) 06/08/1983    Colon cancer screen colonoscopy  06/08/2014    Shingles Vaccine (2 of 2) 01/03/2020    Lipid screen  04/17/2020    Flu vaccine (1) 09/01/2020    Diabetic foot exam  09/27/2020    Potassium monitoring  09/28/2020    Creatinine monitoring  09/28/2020    A1C test (Diabetic or Prediabetic)  12/02/2020    Diabetic microalbuminuria test  12/02/2020    DTaP/Tdap/Td vaccine (2 - Td) 11/11/2025    Hepatitis A vaccine  Aged Out    Hib vaccine  Aged Out    Meningococcal (ACWY) vaccine  Aged Out    Pneumococcal 0-64 years Vaccine  Aged Out       Hemoglobin A1C (%)   Date Value   12/02/2019 6.6   08/20/2019 10.0 (H)   07/18/2019 8.4             ( goal A1C is < 7)   Microalb/Crt.  Ratio (mcg/mg creat)   Date Value   12/02/2019 CANNOT BE CALCULATED     LDL Cholesterol (mg/dL)   Date Value   04/17/2019 22       (goal LDL is <100)   AST (U/L)   Date Value   05/04/2019 26     ALT (U/L)   Date Value   05/04/2019 43 (H)     BUN (mg/dL)   Date Value   08/30/2019 9     BP Readings from Last 3 Encounters:   06/08/20 135/74   03/11/20 (!) 184/77   12/02/19 (!) 153/78          (goal 120/80)    All Future Testing planned in [facility]  Lab Frequency Next Occurrence   VL ARTERIAL PVR LOWER WO EXERCISE Once 06/14/2021               Patient Active Problem List:     PAD (peripheral artery disease) (HCC)     Subacute osteomyelitis of right tibia (HCC)     Metabolic encephalopathy     Abnormal CT of the head     Hepatitis     Acute cerebral infarction associated with systemic hypoxia or ischemia (HCC) Multi infarct state     Carotid stenosis, bilateral     Right foot drop     Thrombocytosis (HCC)     Acute CVA (cerebrovascular accident) ([address]     Moderate malnutrition ([address]     Cellulitis of right lower extremity     Right-sided extracranial carotid artery stenosis     Essential hypertension     Type 2 diabetes mellitus with diabetic neuropathy, without long-term current use of insulin (HCC)     Leukocytosis     Status post angiography of extremity     Ischemic gangrene ([address]     Pyogenic inflammation of bone ([facility])     Coronary artery disease     Above knee amputation of right lower extremity ([address]

## 2020-12-08 RX ORDER — ATORVASTATIN CALCIUM 40 MG/1
40 TABLET, FILM COATED ORAL NIGHTLY
Qty: 30 TABLET | Refills: 3 | Status: SHIPPED | OUTPATIENT
Start: 2020-12-08 | End: 2021-05-28

## 2020-12-10 ENCOUNTER — TELEPHONE (OUTPATIENT)
Dept: FAMILY MEDICINE CLINIC | Age: 55
End: 2020-12-10

## 2020-12-10 ENCOUNTER — OFFICE VISIT (OUTPATIENT)
Dept: FAMILY MEDICINE CLINIC | Age: 55
End: 2020-12-10
Payer: MEDICAID

## 2020-12-10 VITALS
TEMPERATURE: 97.1 F | BODY MASS INDEX: 30.66 KG/M2 | HEART RATE: 66 BPM | WEIGHT: 184 LBS | SYSTOLIC BLOOD PRESSURE: 128 MMHG | DIASTOLIC BLOOD PRESSURE: 65 MMHG | HEIGHT: 65 IN

## 2020-12-10 PROBLEM — Z89.611 HX OF AKA (ABOVE KNEE AMPUTATION), RIGHT (HCC): Status: ACTIVE | Noted: 2020-12-10

## 2020-12-10 PROBLEM — N52.1 ERECTILE DYSFUNCTION ASSOCIATED WITH TYPE 2 DIABETES MELLITUS (HCC): Status: ACTIVE | Noted: 2020-12-10

## 2020-12-10 PROBLEM — L89.610 PRESSURE INJURY OF RIGHT HEEL, UNSTAGEABLE (HCC): Status: ACTIVE | Noted: 2020-12-10

## 2020-12-10 PROBLEM — E11.69 ERECTILE DYSFUNCTION ASSOCIATED WITH TYPE 2 DIABETES MELLITUS (HCC): Status: ACTIVE | Noted: 2020-12-10

## 2020-12-10 LAB — HBA1C MFR BLD: 12 %

## 2020-12-10 PROCEDURE — G8427 DOCREV CUR MEDS BY ELIG CLIN: HCPCS | Performed by: STUDENT IN AN ORGANIZED HEALTH CARE EDUCATION/TRAINING PROGRAM

## 2020-12-10 PROCEDURE — 3017F COLORECTAL CA SCREEN DOC REV: CPT | Performed by: STUDENT IN AN ORGANIZED HEALTH CARE EDUCATION/TRAINING PROGRAM

## 2020-12-10 PROCEDURE — 90746 HEPB VACCINE 3 DOSE ADULT IM: CPT | Performed by: FAMILY MEDICINE

## 2020-12-10 PROCEDURE — 99213 OFFICE O/P EST LOW 20 MIN: CPT | Performed by: STUDENT IN AN ORGANIZED HEALTH CARE EDUCATION/TRAINING PROGRAM

## 2020-12-10 PROCEDURE — 2022F DILAT RTA XM EVC RTNOPTHY: CPT | Performed by: STUDENT IN AN ORGANIZED HEALTH CARE EDUCATION/TRAINING PROGRAM

## 2020-12-10 PROCEDURE — G8417 CALC BMI ABV UP PARAM F/U: HCPCS | Performed by: STUDENT IN AN ORGANIZED HEALTH CARE EDUCATION/TRAINING PROGRAM

## 2020-12-10 PROCEDURE — 3046F HEMOGLOBIN A1C LEVEL >9.0%: CPT | Performed by: STUDENT IN AN ORGANIZED HEALTH CARE EDUCATION/TRAINING PROGRAM

## 2020-12-10 PROCEDURE — 1036F TOBACCO NON-USER: CPT | Performed by: STUDENT IN AN ORGANIZED HEALTH CARE EDUCATION/TRAINING PROGRAM

## 2020-12-10 PROCEDURE — 83036 HEMOGLOBIN GLYCOSYLATED A1C: CPT | Performed by: STUDENT IN AN ORGANIZED HEALTH CARE EDUCATION/TRAINING PROGRAM

## 2020-12-10 PROCEDURE — G8484 FLU IMMUNIZE NO ADMIN: HCPCS | Performed by: STUDENT IN AN ORGANIZED HEALTH CARE EDUCATION/TRAINING PROGRAM

## 2020-12-10 RX ORDER — LIRAGLUTIDE 6 MG/ML
1.2 INJECTION SUBCUTANEOUS DAILY
Qty: 2 PEN | Refills: 3 | Status: SHIPPED | OUTPATIENT
Start: 2020-12-10 | End: 2021-01-07 | Stop reason: SDUPTHER

## 2020-12-10 RX ORDER — GLUCOSAMINE HCL/CHONDROITIN SU 500-400 MG
CAPSULE ORAL
Qty: 100 STRIP | Refills: 3 | Status: SHIPPED | OUTPATIENT
Start: 2020-12-10 | End: 2021-05-28

## 2020-12-10 RX ORDER — SILDENAFIL 100 MG/1
100 TABLET, FILM COATED ORAL PRN
Qty: 9 TABLET | Refills: 3 | Status: SHIPPED | OUTPATIENT
Start: 2020-12-10 | End: 2021-01-07 | Stop reason: SDUPTHER

## 2020-12-10 ASSESSMENT — ENCOUNTER SYMPTOMS
VOMITING: 0
SHORTNESS OF BREATH: 0
DIARRHEA: 0
COUGH: 0
PHOTOPHOBIA: 0
BACK PAIN: 0
WHEEZING: 0
ABDOMINAL PAIN: 0
APNEA: 0
NAUSEA: 0
ABDOMINAL DISTENTION: 0
COLOR CHANGE: 0
CONSTIPATION: 0

## 2020-12-10 NOTE — PATIENT INSTRUCTIONS
if you have a minor cold. If you have a more severe illness with a fever or any type of infection, your doctor may recommend waiting until you get better before you receive this vaccine. It is not known whether this vaccine will harm an unborn baby. However, if you are at a high risk for infection with hepatitis B during pregnancy, your doctor should determine whether you need this vaccine. If you are pregnant, your name may be listed on a pregnancy registry to track the effects of this vaccine on the baby. It may not be safe to breastfeed while receiving this medicine. Ask your doctor about any risk. How is this vaccine given? This vaccine is given as an injection (shot) into a muscle. A healthcare provider will give you this injection. The hepatitis B vaccine is given in a series of 2 to 4 shots. The booster shots are sometimes given 1 month and 6 months after the first shot. If you have a high risk of hepatitis B infection, you may be given an additional booster 1 to 2 months after the third shot. Your individual booster schedule may be different from these guidelines. Follow your doctor's instructions or the schedule recommended by the health department of the state you live in. What happens if I miss a dose? Contact your doctor if you will miss a booster dose or if you get behind schedule. The next dose should be given as soon as possible. There is no need to start over. Be sure to receive all recommended doses of this vaccine or you may not be fully protected against disease. What happens if I overdose? An overdose of this vaccine is unlikely to occur. What should I avoid before or after receiving this vaccine? Follow your doctor's instructions about any restrictions on food, beverages, or activity. What are the possible side effects of this vaccine?   Get emergency medical help if you have signs of an allergic reaction (hives, difficult breathing, swelling in your face or throat) or a severe skin reaction (fever, sore throat, burning eyes, skin pain, red or purple skin rash with blistering and peeling). You should not receive a booster vaccine if you had a life-threatening allergic reaction after the first shot. Keep track of any and all side effects you have after receiving this vaccine. When you receive a booster dose, you will need to tell the doctor if the previous shot caused any side effects. Becoming infected with hepatitis B is much more dangerous to your health than receiving this vaccine. However, like any medicine, this vaccine can cause side effects but the risk of serious side effects is extremely low. Call your doctor at once if you have:  · a light-headed feeling, like you might pass out;  · seizure-like muscle movements; or  · fever, swollen glands. Common side effects include:  · headache;  · feeling tired; or  · redness, pain, swelling, or a lump where the shot was given. This is not a complete list of side effects and others may occur. Call your doctor for medical advice about side effects. You may report vaccine side effects to the [place] [address] and Human Services at [phone]. What other drugs will affect hepatitis B vaccine? Other drugs may affect this vaccine, including prescription and over-the-counter medicines, vitamins, and herbal products. Tell your doctor about all your current medicines and any medicine you start or stop using. Where can I get more information? Your doctor or pharmacist can provide more information about this vaccine. Additional information is available from your local health department or the Centers for Disease Control and Prevention. Remember, keep this and all other medicines out of the reach of children, never share your medicines with others, and use this medication only for the indication prescribed.    Every effort has been made to ensure that the information provided by [address] is accurate, up-to-date, and complete, but no guarantee is made to that effect. Drug information contained herein may be time sensitive. [facility] information has been compiled for use by healthcare practitioners and consumers in the [place] and therefore [facility] does not warrant that uses outside of the [place] are appropriate, unless specifically indicated otherwise. [facility]'s drug information does not endorse drugs, diagnose patients or recommend therapy. [facility] drug information is an informational resource designed to assist licensed healthcare practitioners in caring for their patients and/or to serve consumers viewing this service as a supplement to, and not a substitute for, the expertise, skill, knowledge and judgment of healthcare practitioners. The absence of a warning for a given drug or drug combination in no way should be construed to indicate that the drug or drug combination is safe, effective or appropriate for any given patient. [facility] does not assume any responsibility for any aspect of healthcare administered with the aid of information [facility] provides. The information contained herein is not intended to cover all possible uses, directions, precautions, warnings, drug interactions, allergic reactions, or adverse effects. If you have questions about the drugs you are taking, check with your doctor, nurse or pharmacist.  Copyright 6767-3133 [address]: 7.01. Revision date: 4/1/2020. Care instructions adapted under license by [facility] ([facility]). If you have questions about a medical condition or this instruction, always ask your healthcare professional. [address] any warranty or liability for your use of this information.

## 2020-12-10 NOTE — PROGRESS NOTES
Subjective:    Vinita Pruitt is a 63 y.o. male with  has a past medical history of CAD (coronary artery disease), Cardiac arrest ([address] Diabetes mellitus ([address] Hyperlipidemia, Hypertension, Osteolysis, PEA (Pulseless electrical activity) ([address] PVD (peripheral vascular disease) ([address] and Wound, open. Family History   Problem Relation Age of Onset    Heart Disease Mother         CAD WITH STENT    Diabetes Mother     Stroke Mother     Diabetes Father     Stroke Father     Heart Disease Father         CAD    High Blood Pressure Father     High Blood Pressure Sister     Diabetes Brother     Heart Disease Brother     High Blood Pressure Brother        Presented tot office today for:  Chief Complaint   Patient presents with    Diabetes     follow up    Forms     transportaion    Discuss Medications    Health Maintenance     said [place] [facility] will double check/ flu vacccine had done soemone came to his house he states/ eye exam- lasklmya and joe unsure of name. JASKARAN Wild is a 24-year-old male with a PMH significant for type 2 diabetes mellitus, essential hypertension, CAD, carotid stenosis, PAD, and s/p right above-the-knee amputation. Patient is here today for follow-up of diabetes. Patient states he takes 24 units of Lantus in the evening. His morning blood glucose ranges from 110-140s. He denies any hypoglycemic events. Patient's POCT HbA1c is 12 today. Denies fever, chills, chest pain, SOB, palpitations, diaphoresis, or confusion. Patient also complains of inability to initiate and maintain erection. He believes this is due to his diabetes and PAD. Review of Systems   Constitutional: Negative for activity change, appetite change and fatigue. HENT: Negative for congestion. Eyes: Negative for photophobia and visual disturbance. Respiratory: Negative for apnea, cough, shortness of breath and wheezing.     Cardiovascular: Negative for chest pain, palpitations and leg swelling. Gastrointestinal: Negative for abdominal distention, abdominal pain, constipation, diarrhea, nausea and vomiting. Endocrine: Negative for polyuria. Genitourinary: Negative for difficulty urinating, discharge, penile pain, testicular pain and urgency. Musculoskeletal: Positive for gait problem (S/p right above-the-knee amputation). Negative for arthralgias and back pain. Skin: Negative for color change. Neurological: Negative for dizziness, syncope and headaches. Psychiatric/Behavioral: Negative for agitation, behavioral problems, confusion, decreased concentration and dysphoric mood. Objective:    /65 (Site: Left Upper Arm, Position: Sitting, Cuff Size: Medium Adult) Comment: machine  Pulse 66   Temp 97.1 °F (36.2 °C) (Temporal)   Ht 5' 5\" (1.651 m)   Wt 184 lb (83.5 kg)   BMI 30.62 kg/m²    BP Readings from Last 3 Encounters:   12/10/20 128/65   06/08/20 135/74   03/11/20 (!) 184/77     Physical Exam  Constitutional:       General: He is not in acute distress. Appearance: He is obese. He is not ill-appearing. Comments: Patient has right leg prosthesis   Cardiovascular:      Rate and Rhythm: Normal rate and regular rhythm. Heart sounds: No murmur. No gallop. Pulmonary:      Effort: Pulmonary effort is normal. No respiratory distress. Breath sounds: Normal breath sounds. No wheezing. Abdominal:      General: Bowel sounds are normal. There is no distension. Tenderness: There is no abdominal tenderness. Musculoskeletal:         General: No swelling or deformity. Comments: S/p right above-the-knee amputation. Has a right leg prosthesis   Skin:     General: Skin is warm. Capillary Refill: Capillary refill takes less than 2 seconds. Neurological:      General: No focal deficit present. Mental Status: He is alert and oriented to person, place, and time.    Psychiatric:         Mood and Affect: Mood normal. Thought Content: Thought content normal.           Lab Results   Component Value Date    WBC 10.6 09/03/2019    HGB 10.1 (L) 08/30/2019    HCT 32.1 (L) 08/30/2019     (H) 08/30/2019    CHOL 56 04/17/2019    TRIG 107 04/17/2019    HDL 13 (L) 04/17/2019    ALT 43 (H) 05/04/2019    AST 26 05/04/2019     (L) 08/30/2019    K 4.0 08/30/2019    CL 92 (L) 08/30/2019    CREATININE 0.58 09/28/2019    BUN 9 08/30/2019    CO2 23 08/30/2019    INR 1.2 04/25/2019    LABA1C 12.0 12/10/2020    LABMICR CANNOT BE CALCULATED 12/02/2019     Lab Results   Component Value Date    CALCIUM 9.1 08/30/2019    PHOS 3.4 07/04/2019     Lab Results   Component Value Date    LDLCHOLESTEROL 22 04/17/2019       Assessment and Plan:    1. Type 2 diabetes mellitus with diabetic neuropathy, without long-term current use of insulin ([facility])  -HbA1c 12 today; last 6.6  -Patient currently takes Lantus 24 units in the evening  -Started Victoza 1.2 mg injection once daily  -Counseled patient on benefits and side effects of medication  -Performed diabetic foot exam today which was unremarkable  -Ordered urine microalbumin  -F/U 1 month for diabetes  -  DIABETES FOOT EXAM  - POCT glycosylated hemoglobin (Hb A1C)  - Microalbumin, Ur; Future  - blood glucose monitor strips; Test 2 times a day & as needed for symptoms of irregular blood glucose. Dispense: 100 strip; Refill: 3  - Liraglutide (VICTOZA) 18 MG/3ML SOPN SC injection; Inject 1.2 mg into the skin daily  Dispense: 2 pen; Refill: 3    2. Hx of AKA (above knee amputation), right ([address]  -Patient ambulates with walker  -Counseled patient on safety and fall risks    3. Erectile dysfunction associated with type 2 diabetes mellitus ([address]  -Started Viagra 100 mg as needed  -Counseled patient on the benefits and side effects of medication  -Reviewed medication list and patient does not take nitrates  - sildenafil (VIAGRA) 100 MG tablet;  Take 1 tablet by mouth as needed for Erectile Dysfunction Dispense: 9 tablet; Refill: 3    4. Screening for hyperlipidemia  -Ordered screening lipid panel  - Lipid Panel; Future    5. Need for hepatitis B vaccination  -Hepatitis B vaccination given today  - Hep B Vaccine Adult (ENGERIX-B)          Requested Prescriptions     Signed Prescriptions Disp Refills    blood glucose monitor strips 100 strip 3     Sig: Test 2 times a day & as needed for symptoms of irregular blood glucose.  Liraglutide (VICTOZA) 18 MG/3ML SOPN SC injection 2 pen 3     Sig: Inject 1.2 mg into the skin daily    sildenafil (VIAGRA) 100 MG tablet 9 tablet 3     Sig: Take 1 tablet by mouth as needed for Erectile Dysfunction       Medications Discontinued During This Encounter   Medication Reason    blood glucose monitor strips REORDER       Return in about 4 weeks (around 1/7/2021), or f/u 4 weeks for diabetes. Chelsea Dee received counseling on the following healthy behaviors:   Reviewed prior labs and health maintenance  Continue current medications, diet and exercise. Discussed use, benefit, and side effects of prescribed medications. Barriers to medication compliance addressed. Patient given educational materials - see patient instructions  Was a self-tracking handout given in paper form or via [employer]? No:     Requested Prescriptions     Signed Prescriptions Disp Refills    blood glucose monitor strips 100 strip 3     Sig: Test 2 times a day & as needed for symptoms of irregular blood glucose.  Liraglutide (VICTOZA) 18 MG/3ML SOPN SC injection 2 pen 3     Sig: Inject 1.2 mg into the skin daily    sildenafil (VIAGRA) 100 MG tablet 9 tablet 3     Sig: Take 1 tablet by mouth as needed for Erectile Dysfunction       All patient questions answered. Patient voiced understanding. Quality Measures    Body mass index is 30.62 kg/m². Normal. Weight control planned discussed Healthy diet and regular exercise.     BP: 128/65(machine) Blood pressure is normal. Treatment plan consists of No treatment change needed.     Lab Results   Component Value Date    LDLCHOLESTEROL 22 04/17/2019    (goal LDL reduction with dx if diabetes is 50% LDL reduction)      PHQ Scores 3/11/2020 6/19/2019   PHQ2 Score 0 0   PHQ9 Score 0 0     Interpretation of Total Score Depression Severity: 1-4 = Minimal depression, 5-9 = Mild depression, 10-14 = Moderate depression, 15-19 = Moderately severe depression, 20-27 = Severe depression

## 2020-12-10 NOTE — TELEPHONE ENCOUNTER
Called patient to inform the last page on the transportation form needs to be completed by him, he will come to office Tuesday to  and complete. Also, advise the patient cologuard has no history of his cologuard being completed, would need to send new one to his house per [facility].

## 2020-12-11 NOTE — PROGRESS NOTES
Attending Physician Statement  I have discussed the case, including pertinent history and exam findings with the resident. I have seen and examined the patient and the key elements of the encounter have been performed by me. I agree with the assessment, plan and orders as documented by the resident.         /65 (Site: Left Upper Arm, Position: Sitting, Cuff Size: Medium Adult) Comment: machine  Pulse 66   Temp 97.1 °F (36.2 °C) (Temporal)   Ht 5' 5\" (1.651 m)   Wt 184 lb (83.5 kg)   BMI 30.62 kg/m²    BP Readings from Last 3 Encounters:   12/10/20 128/65   06/08/20 135/74   03/11/20 (!) 184/77     Wt Readings from Last 3 Encounters:   12/10/20 184 lb (83.5 kg)   06/08/20 169 lb (76.7 kg)   03/11/20 170 lb (77.1 kg)       Jean Carlos Paris DO 12/10/2020 10:54 PM

## 2020-12-15 ENCOUNTER — HOSPITAL ENCOUNTER (OUTPATIENT)
Age: 55
Setting detail: SPECIMEN
Discharge: HOME | End: 2020-12-15
Payer: MEDICAID

## 2020-12-15 LAB
CHOLESTEROL/HDL RATIO: 4.4
CHOLESTEROL: 109 MG/DL
CREATININE URINE: 166.8 MG/DL (ref 39–259)
HDLC SERPL-MCNC: 25 MG/DL
LDL CHOLESTEROL: 42 MG/DL (ref 0–130)
MICROALBUMIN/CREAT 24H UR: 13 MG/L
MICROALBUMIN/CREAT UR-RTO: 8 MCG/MG CREAT
TRIGL SERPL-MCNC: 210 MG/DL
VLDLC SERPL CALC-MCNC: ABNORMAL MG/DL (ref 1–30)

## 2021-01-07 ENCOUNTER — OFFICE VISIT (OUTPATIENT)
Dept: FAMILY MEDICINE CLINIC | Age: 56
End: 2021-01-07
Payer: MEDICAID

## 2021-01-07 VITALS
HEIGHT: 65 IN | TEMPERATURE: 97.9 F | SYSTOLIC BLOOD PRESSURE: 135 MMHG | HEART RATE: 63 BPM | DIASTOLIC BLOOD PRESSURE: 74 MMHG | BODY MASS INDEX: 30.32 KG/M2 | WEIGHT: 182 LBS

## 2021-01-07 DIAGNOSIS — Z11.59 NEED FOR HEPATITIS C SCREENING TEST: ICD-10-CM

## 2021-01-07 DIAGNOSIS — E11.69 ERECTILE DYSFUNCTION ASSOCIATED WITH TYPE 2 DIABETES MELLITUS (HCC): ICD-10-CM

## 2021-01-07 DIAGNOSIS — I10 ESSENTIAL HYPERTENSION: ICD-10-CM

## 2021-01-07 DIAGNOSIS — E11.40 TYPE 2 DIABETES MELLITUS WITH DIABETIC NEUROPATHY, WITHOUT LONG-TERM CURRENT USE OF INSULIN (HCC): Primary | ICD-10-CM

## 2021-01-07 DIAGNOSIS — N52.1 ERECTILE DYSFUNCTION ASSOCIATED WITH TYPE 2 DIABETES MELLITUS (HCC): ICD-10-CM

## 2021-01-07 DIAGNOSIS — Z11.4 ENCOUNTER FOR SCREENING FOR HIV: ICD-10-CM

## 2021-01-07 DIAGNOSIS — Z23 ENCOUNTER FOR VACCINATION: ICD-10-CM

## 2021-01-07 PROCEDURE — 99214 OFFICE O/P EST MOD 30 MIN: CPT | Performed by: STUDENT IN AN ORGANIZED HEALTH CARE EDUCATION/TRAINING PROGRAM

## 2021-01-07 PROCEDURE — 3046F HEMOGLOBIN A1C LEVEL >9.0%: CPT | Performed by: STUDENT IN AN ORGANIZED HEALTH CARE EDUCATION/TRAINING PROGRAM

## 2021-01-07 PROCEDURE — G8484 FLU IMMUNIZE NO ADMIN: HCPCS | Performed by: STUDENT IN AN ORGANIZED HEALTH CARE EDUCATION/TRAINING PROGRAM

## 2021-01-07 PROCEDURE — 2022F DILAT RTA XM EVC RTNOPTHY: CPT | Performed by: STUDENT IN AN ORGANIZED HEALTH CARE EDUCATION/TRAINING PROGRAM

## 2021-01-07 PROCEDURE — 3017F COLORECTAL CA SCREEN DOC REV: CPT | Performed by: STUDENT IN AN ORGANIZED HEALTH CARE EDUCATION/TRAINING PROGRAM

## 2021-01-07 PROCEDURE — G8427 DOCREV CUR MEDS BY ELIG CLIN: HCPCS | Performed by: STUDENT IN AN ORGANIZED HEALTH CARE EDUCATION/TRAINING PROGRAM

## 2021-01-07 PROCEDURE — 1036F TOBACCO NON-USER: CPT | Performed by: STUDENT IN AN ORGANIZED HEALTH CARE EDUCATION/TRAINING PROGRAM

## 2021-01-07 PROCEDURE — 90746 HEPB VACCINE 3 DOSE ADULT IM: CPT | Performed by: STUDENT IN AN ORGANIZED HEALTH CARE EDUCATION/TRAINING PROGRAM

## 2021-01-07 PROCEDURE — G8417 CALC BMI ABV UP PARAM F/U: HCPCS | Performed by: STUDENT IN AN ORGANIZED HEALTH CARE EDUCATION/TRAINING PROGRAM

## 2021-01-07 RX ORDER — SILDENAFIL 100 MG/1
100 TABLET, FILM COATED ORAL PRN
Qty: 15 TABLET | Refills: 2 | Status: SHIPPED | OUTPATIENT
Start: 2021-01-07

## 2021-01-07 RX ORDER — LIRAGLUTIDE 6 MG/ML
0.6 INJECTION SUBCUTANEOUS DAILY
Qty: 2 PEN | Refills: 3 | Status: SHIPPED | OUTPATIENT
Start: 2021-01-07 | End: 2021-05-28

## 2021-01-07 ASSESSMENT — ENCOUNTER SYMPTOMS
NAUSEA: 0
EYE PAIN: 0
RHINORRHEA: 0
VOMITING: 0
PHOTOPHOBIA: 0
SORE THROAT: 0
SHORTNESS OF BREATH: 0
COUGH: 0
WHEEZING: 0
ABDOMINAL PAIN: 0

## 2021-01-07 ASSESSMENT — PATIENT HEALTH QUESTIONNAIRE - PHQ9: 1. LITTLE INTEREST OR PLEASURE IN DOING THINGS: 0

## 2021-01-07 NOTE — PROGRESS NOTES
Visit Information    Have you changed or started any medications since your last visit including any over-the-counter medicines, vitamins, or herbal medicines? no   Have you stopped taking any of your medications? Is so, why? -  no  Are you having any side effects from any of your medications? - no    Have you seen any other physician or provider since your last visit?  no   Have you had any other diagnostic tests since your last visit?  no   Have you been seen in the emergency room and/or had an admission in a hospital since we last saw you?  no   Have you had your routine dental cleaning in the past 6 months?  no     Do you have an active MyChart account? If no, what is the barrier?   Yes    Patient Care Team:  Leticia Cintron MD as PCP - General (Family Medicine)  Leticia Cintron MD as PCP - [facility]    Medical History Review  Past Medical, Family, and Social History reviewed and does not contribute to the patient presenting condition    Health Maintenance   Topic Date Due    Hepatitis C screen  1965    Diabetic retinal exam  06/08/1974    HIV screen  06/08/1979    Colon cancer screen colonoscopy  06/08/2014    Flu vaccine (1) 09/01/2020    Potassium monitoring  09/28/2020    Creatinine monitoring  09/28/2020    Hepatitis B vaccine (2 of 3 - Risk 3-dose series) 01/07/2021    A1C test (Diabetic or Prediabetic)  03/10/2021    Diabetic foot exam  12/10/2021    Diabetic microalbuminuria test  12/15/2021    Lipid screen  12/15/2021    DTaP/Tdap/Td vaccine (2 - Td) 11/11/2025    Shingles Vaccine  Completed    Pneumococcal 0-64 years Vaccine  Completed    Hepatitis A vaccine  Aged Out    Hib vaccine  Aged Out    Meningococcal (ACWY) vaccine  Aged Out

## 2021-01-07 NOTE — PROGRESS NOTES
Subjective:    Jamel Muir is a 63 y.o. male with  has a past medical history of CAD (coronary artery disease), Cardiac arrest ([address] Diabetes mellitus ([address] Hyperlipidemia, Hypertension, Osteolysis, PEA (Pulseless electrical activity) ([address] PVD (peripheral vascular disease) ([address] and Wound, open. Family History   Problem Relation Age of Onset    Heart Disease Mother         CAD WITH STENT    Diabetes Mother     Stroke Mother     Diabetes Father     Stroke Father     Heart Disease Father         CAD    High Blood Pressure Father     High Blood Pressure Sister     Diabetes Brother     Heart Disease Brother     High Blood Pressure Brother        Presented tot office today for:  Chief Complaint   Patient presents with   [address] Maintenance     pt refuse vaccines       HPI  Patient is a 79-year-old male here for follow-up of diabetes    Follow-up of Type 2 diabetes mellitus. Meds - 24 units of Lantus in the evening, victoza daily, admelog  Compliance   Home blood sugar records: fasting range: 114-160  Any episodes of hypoglycemia? no  Fluctuating blood sugars?no  Weight trend: stable  Current diet: in general, a \"healthy\" diet    Current exercise: walking  Known diabetic complications: cardiovascular disease and peripheral vascular disease    Hgb A1c -   Lab Results   Component Value Date    LABA1C 12.0 12/10/2020     BP -   BP Readings from Last 1 Encounters:   01/07/21 135/74     Lipid Panel -   Lab Results   Component Value Date    HDL 25 12/15/2020    TRIG 210 12/15/2020     Albumin to creatinine Ratio - 8  Is He on ACE inhibitor or angiotensin II receptor blocker? Yes  Is He on Aspirin? Yes  Eye exam current (within one year): unknown  Foot exam current (within one year): yes  Is He Smoker? No  Did He receive influenza vaccine within the last 12 months? No  Did He receive Pneumococcal vaccine? Yes    Patient has not been tolerating Victoza and lower the dose to 0.6 mg daily. States has been causing upset stomach. Review of Systems   Constitutional: Negative for chills and fever. HENT: Negative for congestion, rhinorrhea and sore throat. Eyes: Negative for photophobia and pain. Respiratory: Negative for cough, shortness of breath and wheezing. Cardiovascular: Negative for chest pain and palpitations. Gastrointestinal: Negative for abdominal pain, nausea and vomiting. Genitourinary: Negative for frequency and urgency. Musculoskeletal: Negative for arthralgias and myalgias. Neurological: Negative for dizziness, light-headedness and headaches. Objective:    /74 (Site: Right Upper Arm, Position: Sitting, Cuff Size: Medium Adult)   Pulse 63   Temp 97.9 °F (36.6 °C) (Temporal)   Ht 5' 5\" (1.651 m)   Wt 182 lb (82.6 kg)   BMI 30.29 kg/m²    BP Readings from Last 3 Encounters:   01/07/21 135/74   12/10/20 128/65   06/08/20 135/74       Physical Exam  Constitutional:       General: He is not in acute distress. Appearance: He is well-developed. HENT:      Head: Normocephalic and atraumatic. Neck:      Musculoskeletal: Neck supple. Trachea: No tracheal deviation. Cardiovascular:      Rate and Rhythm: Normal rate and regular rhythm. Heart sounds: No murmur. No friction rub. No gallop. Pulmonary:      Effort: Pulmonary effort is normal.      Breath sounds: Normal breath sounds. No wheezing or rales. Abdominal:      General: There is no distension. Palpations: Abdomen is soft. There is no mass. Tenderness: There is no abdominal tenderness. Neurological:      Mental Status: He is alert and oriented to person, place, and time. Psychiatric:         Behavior: Behavior is cooperative.          Lab Results   Component Value Date    WBC 10.6 09/03/2019    HGB 10.1 (L) 08/30/2019    HCT 32.1 (L) 08/30/2019     (H) 08/30/2019    CHOL 109 12/15/2020    TRIG 210 (H) 12/15/2020    HDL 25 (L) 12/15/2020    ALT 43 (H) 05/04/2019 AST 26 05/04/2019     (L) 08/30/2019    K 4.0 08/30/2019    CL 92 (L) 08/30/2019    CREATININE 0.58 09/28/2019    BUN 9 08/30/2019    CO2 23 08/30/2019    INR 1.2 04/25/2019    LABA1C 12.0 12/10/2020    LABMICR 8 12/15/2020     Lab Results   Component Value Date    CALCIUM 9.1 08/30/2019    PHOS 3.4 07/04/2019     Lab Results   Component Value Date    LDLCHOLESTEROL 42 12/15/2020       Assessment and Plan:    1. Type 2 diabetes mellitus with diabetic neuropathy, without long-term current use of insulin (HCC)  - Reduce dose of Victoza to 0.6 mg daily and uptitrate at next visit  - Liraglutide (VICTOZA) 18 MG/3ML SOPN SC injection; Inject 0.6 mg into the skin daily  Dispense: 2 pen; Refill: 3    2. Essential hypertension  - Basic Metabolic Panel; Future  -Well-controlled continue current medication regimen    3. Encounter for screening for HIV  - HIV Screen; Future    4. Need for hepatitis C screening test  - Hepatitis C Antibody; Future    5. Erectile dysfunction associated with type 2 diabetes mellitus (HCC)  - sildenafil (VIAGRA) 100 MG tablet; Take 1 tablet by mouth as needed for Erectile Dysfunction  Dispense: 15 tablet; Refill: 2    6. Encounter for vaccination  - Hep B Vaccine Adult (ENGERIX-B)      Requested Prescriptions     Signed Prescriptions Disp Refills    Liraglutide (VICTOZA) 18 MG/3ML SOPN SC injection 2 pen 3     Sig: Inject 0.6 mg into the skin daily    sildenafil (VIAGRA) 100 MG tablet 15 tablet 2     Sig: Take 1 tablet by mouth as needed for Erectile Dysfunction       Medications Discontinued During This Encounter   Medication Reason    Liraglutide (VICTOZA) 18 MG/3ML SOPN SC injection REORDER    sildenafil (VIAGRA) 100 MG tablet Majo Davis received counseling on the following healthy behaviors:nutrition, exercise and medication adherence    Discussed use, benefit, and side effects of prescribed medications. Barriers to medication compliance addressed.       All patient questions

## 2021-01-19 DIAGNOSIS — E11.40 TYPE 2 DIABETES MELLITUS WITH DIABETIC NEUROPATHY, WITHOUT LONG-TERM CURRENT USE OF INSULIN (HCC): ICD-10-CM

## 2021-01-19 NOTE — TELEPHONE ENCOUNTER
Last visit: 1/7/2021  Last Med refill: 12/12/2020  Does patient have enough medication for 72 hours: Yes    Next Visit Date:  No future appointments. Health Maintenance   Topic Date Due    Hepatitis C screen  1965    Diabetic retinal exam  06/08/1974    HIV screen  06/08/1979    Colon cancer screen colonoscopy  06/08/2014    Potassium monitoring  09/28/2020    Creatinine monitoring  09/28/2020    Flu vaccine (1) 01/07/2022 (Originally 9/1/2020)    A1C test (Diabetic or Prediabetic)  03/10/2021    Hepatitis B vaccine (3 of 3 - Risk 3-dose series) 05/07/2021    Diabetic foot exam  12/10/2021    Diabetic microalbuminuria test  12/15/2021    Lipid screen  12/15/2021    DTaP/Tdap/Td vaccine (2 - Td) 11/11/2025    Shingles Vaccine  Completed    Pneumococcal 0-64 years Vaccine  Completed    Hepatitis A vaccine  Aged Out    Hib vaccine  Aged Out    Meningococcal (ACWY) vaccine  Aged Out       Hemoglobin A1C (%)   Date Value   12/10/2020 12.0   12/02/2019 6.6   08/20/2019 10.0 (H)             ( goal A1C is < 7)   Microalb/Crt.  Ratio (mcg/mg creat)   Date Value   12/15/2020 8     LDL Cholesterol (mg/dL)   Date Value   12/15/2020 42   04/17/2019 22       (goal LDL is <100)   AST (U/L)   Date Value   05/04/2019 26     ALT (U/L)   Date Value   05/04/2019 43 (H)     BUN (mg/dL)   Date Value   08/30/2019 9     BP Readings from Last 3 Encounters:   01/07/21 135/74   12/10/20 128/65   06/08/20 135/74          (goal 120/80)    All Future Testing planned in [facility]  Lab Frequency Next Occurrence   VL ARTERIAL PVR LOWER WO EXERCISE Once 06/14/2021   Cologuard (For External Results Only) Once 89/19/3625   Basic Metabolic Panel Once 86/80/4292   HIV Screen Once 01/07/2022   Hepatitis C Antibody Once 01/07/2022               Patient Active Problem List:     PAD (peripheral artery disease) ([address]     Subacute osteomyelitis of right tibia (HCC)     Metabolic encephalopathy     Abnormal CT of the head Hepatitis     Acute cerebral infarction associated with systemic hypoxia or ischemia (HCC)     Multi infarct state     Carotid stenosis, bilateral     Right foot drop     Thrombocytosis (HCC)     Acute CVA (cerebrovascular accident) ([address]     Moderate malnutrition ([address]     Cellulitis of right lower extremity     Right-sided extracranial carotid artery stenosis     Essential hypertension     Type 2 diabetes mellitus with diabetic neuropathy, without long-term current use of insulin (HCC)     Leukocytosis     Status post angiography of extremity     Ischemic gangrene ([address]     Pyogenic inflammation of bone (HCC)     Coronary artery disease     Above knee amputation of right lower extremity (HCC)     Pressure injury of right heel, unstageable ([address]     Hx of AKA (above knee amputation), right (HCC)     Erectile dysfunction associated with type 2 diabetes mellitus ([address]

## 2021-02-01 ENCOUNTER — TELEPHONE (OUTPATIENT)
Dept: FAMILY MEDICINE CLINIC | Age: 56
End: 2021-02-01

## 2021-02-21 NOTE — PROGRESS NOTES
Physical Medicine & Rehabilitation  Progress Note    Chief complaint:  Ambulatory and ADL dysfunction due to cardiac arrest, CABG, CVA, infected right lower extremity hardware, metabolic encephalopathy and respiratory failure        Subjective:      Positive bowel movement on Sunday. No new complaints. Still with occasional chest discomfort with movement. Relieved with sitting still and hugging pillow    ROS:  Denies fevers, chills, sweats. No chest pain, palpitations, lightheadedness. Denies coughing, wheezing or shortness of breath. Denies abdominal pain, nausea, diarrhea or constipation. No new areas of joint pain. Denies new areas of numbness or weakness. Denies new anxiety or depression issues. No new skin problems. Rehabilitation:    PT:  Bed Mobility  Bridging: Stand by assistance(Pt c/o chest pain; exercise aborted after 2 reps)  Supine to Sit: Supervision  Sit to Supine: Supervision  Scooting: Supervision     Transfers:  Sit to Stand: Stand by assistance(Cues for hand position with good return, recall)  Stand to sit: Stand by assistance(Cues for hand position with good return, recall)  Stand Pivot Transfers: Stand by assistance(RW)   Comment: RW; emphasized hand position for stand to sit to prevent chest pain due to uncontrolled sit. Ambulation 1  Surface: level tile  Device: Rolling Walker  Other Apparatus: Right;Dorsiflex Assist  Assistance: Stand by assistance  Quality of Gait: High steppage on right LE to clear floor with right foot drop  Distance: 100'(9 steps after 50', no sitting rest break between)  Comments: Patient had tennis shoes with amb. today. Vitals WNL post amb.      Ambulation 2  Surface - 2: level tile;ramp  Device 2: Rolling Walker  Other Apparatus 2: Right;Dorsiflex Assist  Assistance 2: Stand by assistance  Quality of Gait 2: High steppage on right LE to clear floor with right foot drop, 1 instance of toe catching, self-corrected without LOB.    Distance: 12' negative    Ambulation 3  Surface - 3: uneven;outdoors;ramp  Device 3: Rolling Walker  Other Apparatus 3: Right;Dorsiflex Assist  Assistance 3: Stand by assistance  Quality of Gait 3: High steppage on right LE to clear bricks with R foot drop, 2 instance of toe catching, self-correction without further LOB. Distance: 150'      Stairs/Curb  Stairs?: Yes  Stairs  # Steps : 9  Stairs Height: 8\"  Rails: Bilateral  Device: No Device  Other Apparatus: Right;Dorsiflexon assist  Assistance: Stand by assistance(Close SBA for safety)  Comment: Good sequencing, step-to pattern demo'd today with pt able to verbalize correct sequencing before attempt.  Vitals WNL post steps  FIMS:  TRANSFERS  Bed, Chair, Wheel Chair: 5 - Requires setup/supervision/cues   LOCOMOTION  Primary Mode: Walk  Distance Walked: 240'(RW, level + ramp, R DF assist)  Walk: 5 - Supervision Requires standby supervision or cuing to walk at least 150 feet  Stairs: 2- Maximal Assistance Performs 25-49% of the effort to go up and down 4 to 6 stairs and requires the assistance of one person only(reduced step number due to increased step height)  BALANCE Posture: Fair  Sitting - Static: Good(EOM, no back or UE support)  Sitting - Dynamic: Good(EOM, no back or UE support)  Standing - Static: Fair;+(RW)  Standing - Dynamic: Fair(RW)          OT FIM:   Eatin - Feeds self with setup/supervision/cues and/or requires only setup/supervision/cues to perform tube feedings  Grooming: 3 - Able to perform 2-3 tasks (mod assist)(able to shave, but needed assistance to cut hair  )  Bathin - Able to bathe all 10 areas with setup/sup/cues  Dressing-Upper: 5 - Requires setup/supervision/cues and/or requires assist with presthesis/brace only  Dressing-Lower: 5 - Requires setup/supervision/cues and/or staff applies TEDS/prosthesis/brace only  Toiletin - Requires setup/supervision/cues  Toilet Transfer: 5 - Requires setup/supervision/cues  Tub Transfer: 0 - Activity does not occur  Shower Transfer: 0 - Activity does not occur     Social Interaction: 6 - Patient requires medication for mood and/or effect  Problem Solvin - Patient solves simple/routine tasks 75-90%+   Memory: 5 - Patient requires prompting with stress/unfamiliar situations      Objective:  /69   Pulse 87   Temp 98.2 °F (36.8 °C) (Oral)   Resp 16   Ht 5' 8.11\" (1.73 m)   Wt 145 lb 1.6 oz (65.8 kg)   SpO2 98%   BMI 21.99 kg/m²       Alert, no distress. Good speech and language function. Lungs- no wheezing. Heart regular. Abdomen- soft, positive bowel sounds non-distended, non-tender. No calf tenderness or edema. Motor strength-BUE-4+/5, LLE-4/5 hip and knee, DF trace, PF 3-/5, RLE-0/5 distally    Diagnostics:     CBC:   No results for input(s): WBC, RBC, HGB, HCT, MCV, RDW, PLT in the last 72 hours. BMP:   No results for input(s): NA, K, CL, CO2, PHOS, BUN, CREATININE in the last 72 hours. Invalid input(s): CA  BNP: No results for input(s): BNP in the last 72 hours. PT/INR: No results for input(s): PROTIME, INR in the last 72 hours. APTT: No results for input(s): APTT in the last 72 hours. CARDIAC ENZYMES: No results for input(s): CKMB, CKMBINDEX, TROPONINT in the last 72 hours. Invalid input(s): CKTOTAL;3  FASTING LIPID PANEL:  Lab Results   Component Value Date    CHOL 56 2019    HDL 13 (L) 2019    TRIG 107 2019     LIVER PROFILE:   No results for input(s): AST, ALT, ALB, BILIDIR, BILITOT, ALKPHOS in the last 72 hours.      Current Medications:   Current Facility-Administered Medications: insulin glargine (LANTUS) injection vial 24 Units, 24 Units, Subcutaneous, Daily  oxyCODONE (ROXICODONE) immediate release tablet 5 mg, 5 mg, Oral, Q6H PRN **OR** [DISCONTINUED] oxyCODONE (ROXICODONE) immediate release tablet 10 mg, 10 mg, Oral, Q4H PRN  cephALEXin (KEFLEX) capsule 500 mg, 500 mg, Oral, 4 times per day  lisinopril (PRINIVIL;ZESTRIL) tablet 2.5 mg, 2.5 mg, Oral, negative

## 2021-02-25 ENCOUNTER — TELEPHONE (OUTPATIENT)
Dept: FAMILY MEDICINE CLINIC | Age: 56
End: 2021-02-25

## 2021-03-29 DIAGNOSIS — I73.9 PAD (PERIPHERAL ARTERY DISEASE) (HCC): ICD-10-CM

## 2021-03-29 RX ORDER — CLOPIDOGREL BISULFATE 75 MG/1
TABLET ORAL
Qty: 30 TABLET | Refills: 3 | Status: SHIPPED | OUTPATIENT
Start: 2021-03-29 | End: 2021-08-25

## 2021-03-29 NOTE — TELEPHONE ENCOUNTER
plavix pending for refill     Health Maintenance   Topic Date Due    Hepatitis C screen  Never done    Diabetic retinal exam  Never done    HIV screen  Never done    COVID-19 Vaccine (1) Never done    Colon cancer screen colonoscopy  Never done    Potassium monitoring  09/28/2020    Creatinine monitoring  09/28/2020    A1C test (Diabetic or Prediabetic)  03/10/2021    Flu vaccine (1) 01/07/2022 (Originally 9/1/2020)    Hepatitis B vaccine (3 of 3 - Risk 3-dose series) 05/07/2021    Diabetic foot exam  12/10/2021    Diabetic microalbuminuria test  12/15/2021    Lipid screen  12/15/2021    DTaP/Tdap/Td vaccine (2 - Td) 11/11/2025    Shingles Vaccine  Completed    Pneumococcal 0-64 years Vaccine  Completed    Hepatitis A vaccine  Aged Out    Hib vaccine  Aged Out    Meningococcal (ACWY) vaccine  Aged Out             (applicable per patient's age: Cancer Screenings, Depression Screening, Fall Risk Screening, Immunizations)    Hemoglobin A1C (%)   Date Value   12/10/2020 12.0   12/02/2019 6.6   08/20/2019 10.0 (H)     Microalb/Crt. Ratio (mcg/mg creat)   Date Value   12/15/2020 8     LDL Cholesterol (mg/dL)   Date Value   12/15/2020 42     AST (U/L)   Date Value   05/04/2019 26     ALT (U/L)   Date Value   05/04/2019 43 (H)     BUN (mg/dL)   Date Value   08/30/2019 9      (goal A1C is < 7)   (goal LDL is <100) need 30-50% reduction from baseline     BP Readings from Last 3 Encounters:   01/07/21 135/74   12/10/20 128/65   06/08/20 135/74    (goal /80)      All Future Testing planned in [facility]:  Lab Frequency Next Occurrence   VL ARTERIAL PVR LOWER WO EXERCISE Once 06/14/2021   Cologuard (For External Results Only) Once 54/88/5936   Basic Metabolic Panel Once 85/15/3279   HIV Screen Once 01/07/2022   Hepatitis C Antibody Once 01/07/2022       Next Visit Date:  No future appointments.          Patient Active Problem List:     PAD (peripheral artery disease) (HCC)     Subacute osteomyelitis of right tibia [facility])     Metabolic encephalopathy     Abnormal CT of the head     Hepatitis     Acute cerebral infarction associated with systemic hypoxia or ischemia (HCC)     Multi infarct state     Carotid stenosis, bilateral     Right foot drop     Thrombocytosis (HCC)     Acute CVA (cerebrovascular accident) ([facility]     Moderate malnutrition ([facility]     Cellulitis of right lower extremity     Right-sided extracranial carotid artery stenosis     Essential hypertension     Type 2 diabetes mellitus with diabetic neuropathy, without long-term current use of insulin (HCC)     Leukocytosis     Status post angiography of extremity     Ischemic gangrene ([facility]     Pyogenic inflammation of bone (HCC)     Coronary artery disease     Above knee amputation of right lower extremity (HCC)     Pressure injury of right heel, unstageable (HCC)     Hx of AKA (above knee amputation), right (HCC)     Erectile dysfunction associated with type 2 diabetes mellitus ([facility] [facility]

## 2021-05-26 ENCOUNTER — TELEPHONE (OUTPATIENT)
Dept: FAMILY MEDICINE CLINIC | Age: 56
End: 2021-05-26

## 2021-05-28 DIAGNOSIS — E11.40 TYPE 2 DIABETES MELLITUS WITH DIABETIC NEUROPATHY, WITHOUT LONG-TERM CURRENT USE OF INSULIN (HCC): ICD-10-CM

## 2021-05-28 RX ORDER — ISOPROPYL ALCOHOL 0.75 G/1
SWAB TOPICAL
Qty: 100 EACH | Refills: 3 | Status: SHIPPED | OUTPATIENT
Start: 2021-05-28

## 2021-05-28 RX ORDER — GABAPENTIN 300 MG/1
CAPSULE ORAL
Qty: 90 CAPSULE | Refills: 2 | Status: SHIPPED | OUTPATIENT
Start: 2021-05-28 | End: 2021-08-25

## 2021-05-28 RX ORDER — LANCETS 33 GAUGE
EACH MISCELLANEOUS
Qty: 100 EACH | Refills: 3 | Status: SHIPPED | OUTPATIENT
Start: 2021-05-28

## 2021-05-28 RX ORDER — BLOOD SUGAR DIAGNOSTIC
STRIP MISCELLANEOUS
Qty: 100 STRIP | Refills: 3 | Status: SHIPPED | OUTPATIENT
Start: 2021-05-28

## 2021-05-28 RX ORDER — ATORVASTATIN CALCIUM 40 MG/1
40 TABLET, FILM COATED ORAL NIGHTLY
Qty: 30 TABLET | Refills: 3 | Status: SHIPPED | OUTPATIENT
Start: 2021-05-28 | End: 2021-10-19 | Stop reason: SDUPTHER

## 2021-05-28 RX ORDER — LIRAGLUTIDE 6 MG/ML
INJECTION SUBCUTANEOUS
Qty: 6 ML | Refills: 3 | Status: SHIPPED | OUTPATIENT
Start: 2021-05-28 | End: 2021-11-09 | Stop reason: SINTOL

## 2021-05-28 NOTE — TELEPHONE ENCOUNTER
E-scribe request for pended medications. Please review and e-scribe if applicable. Last Visit Date: 1/7/2021  Next Visit Date:  5/28/2021    Hemoglobin A1C (%)   Date Value   12/10/2020 12.0   12/02/2019 6.6   08/20/2019 10.0 (H)             ( goal A1C is < 7)   Microalb/Crt.  Ratio (mcg/mg creat)   Date Value   12/15/2020 8     LDL Cholesterol (mg/dL)   Date Value   12/15/2020 42       (goal LDL is <100)   AST (U/L)   Date Value   05/04/2019 26     ALT (U/L)   Date Value   05/04/2019 43 (H)     BUN (mg/dL)   Date Value   08/30/2019 9     BP Readings from Last 3 Encounters:   01/07/21 135/74   12/10/20 128/65   06/08/20 135/74          (goal 120/80)        Patient Active Problem List:     PAD (peripheral artery disease) (HCC)     Subacute osteomyelitis of right tibia (HCC)     Metabolic encephalopathy     Abnormal CT of the head     Hepatitis     Acute cerebral infarction associated with systemic hypoxia or ischemia (HCC)     Multi infarct state     Carotid stenosis, bilateral     Right foot drop     Thrombocytosis (HCC)     Acute CVA (cerebrovascular accident) ([address]     Moderate malnutrition ([address]     Cellulitis of right lower extremity     Right-sided extracranial carotid artery stenosis     Essential hypertension     Type 2 diabetes mellitus with diabetic neuropathy, without long-term current use of insulin (HCC)     Leukocytosis     Status post angiography of extremity     Ischemic gangrene ([address]     Pyogenic inflammation of bone (HCC)     Coronary artery disease     Above knee amputation of right lower extremity (HCC)     Pressure injury of right heel, unstageable (HCC)     Hx of AKA (above knee amputation), right (HCC)     Erectile dysfunction associated with type 2 diabetes mellitus ([address]

## 2021-05-28 NOTE — TELEPHONE ENCOUNTER
E-scribe request for pended medications. Please review and e-scribe if applicable. Last Visit Date: 1/7/2021  Next Visit Date:  Visit date not found    Hemoglobin A1C (%)   Date Value   12/10/2020 12.0   12/02/2019 6.6   08/20/2019 10.0 (H)             ( goal A1C is < 7)   Microalb/Crt.  Ratio (mcg/mg creat)   Date Value   12/15/2020 8     LDL Cholesterol (mg/dL)   Date Value   12/15/2020 42       (goal LDL is <100)   AST (U/L)   Date Value   05/04/2019 26     ALT (U/L)   Date Value   05/04/2019 43 (H)     BUN (mg/dL)   Date Value   08/30/2019 9     BP Readings from Last 3 Encounters:   01/07/21 135/74   12/10/20 128/65   06/08/20 135/74          (goal 120/80)        Patient Active Problem List:     PAD (peripheral artery disease) ([facility])     Subacute osteomyelitis of right tibia ([facility])     Metabolic encephalopathy     Abnormal CT of the head     Hepatitis     Acute cerebral infarction associated with systemic hypoxia or ischemia (HCC)     Multi infarct state     Carotid stenosis, bilateral     Right foot drop     Thrombocytosis (HCC)     Acute CVA (cerebrovascular accident) ([address]     Moderate malnutrition ([address]     Cellulitis of right lower extremity     Right-sided extracranial carotid artery stenosis     Essential hypertension     Type 2 diabetes mellitus with diabetic neuropathy, without long-term current use of insulin (HCC)     Leukocytosis     Status post angiography of extremity     Ischemic gangrene ([address]     Pyogenic inflammation of bone (HCC)     Coronary artery disease     Above knee amputation of right lower extremity (HCC)     Pressure injury of right heel, unstageable (HCC)     Hx of AKA (above knee amputation), right (HCC)     Erectile dysfunction associated with type 2 diabetes mellitus ([address]

## 2021-07-02 ENCOUNTER — TELEPHONE (OUTPATIENT)
Dept: VASCULAR SURGERY | Age: 56
End: 2021-07-02

## 2021-07-26 NOTE — PLAN OF CARE
Last refill:11/3/2020  Last OV:2/19/21  Upcoming appointment: 8/20/21      Refill sent   Problem: Risk for Impaired Skin Integrity  Goal: Tissue integrity - skin and mucous membranes  Description  Structural intactness and normal physiological function of skin and  mucous membranes.   Outcome: Ongoing     Problem: Falls - Risk of:  Goal: Will remain free from falls  Description  Will remain free from falls  Outcome: Ongoing     Problem: Falls - Risk of:  Goal: Absence of physical injury  Description  Absence of physical injury  Outcome: Ongoing     Problem: Pain:  Goal: Pain level will decrease  Description  Pain level will decrease  Outcome: Ongoing     Problem: Pain:  Goal: Control of acute pain  Description  Control of acute pain  Outcome: Ongoing     Problem: Pain:  Goal: Control of chronic pain  Description  Control of chronic pain  Outcome: Ongoing

## 2021-08-25 DIAGNOSIS — E11.40 TYPE 2 DIABETES MELLITUS WITH DIABETIC NEUROPATHY, WITHOUT LONG-TERM CURRENT USE OF INSULIN (HCC): ICD-10-CM

## 2021-08-25 DIAGNOSIS — I73.9 PAD (PERIPHERAL ARTERY DISEASE) (HCC): ICD-10-CM

## 2021-08-25 NOTE — TELEPHONE ENCOUNTER
Please address the medication refill and close the encounter. If I can be of assistance, please route to the applicable pool. Thank you. Last visit: 1-7-2021  Last Med refill: 3-29-21 clopidogrel    5- metformin  Does patient have enough medication for 72 hours: No:     Next Visit Date:  No future appointments. Health Maintenance   Topic Date Due    Hepatitis C screen  Never done    Diabetic retinal exam  Never done    COVID-19 Vaccine (1) Never done    HIV screen  Never done    Colon cancer screen colonoscopy  Never done    Potassium monitoring  09/28/2020    Creatinine monitoring  09/28/2020    A1C test (Diabetic or Prediabetic)  03/10/2021    Hepatitis B vaccine (3 of 3 - Risk 3-dose series) 05/07/2021    Flu vaccine (1) 09/01/2021    Diabetic foot exam  12/10/2021    Diabetic microalbuminuria test  12/15/2021    Lipid screen  12/15/2021    DTaP/Tdap/Td vaccine (2 - Td or Tdap) 11/11/2025    Pneumococcal 0-64 years Vaccine (2 of 2 - PPSV23) 06/08/2029    Shingles Vaccine  Completed    Hepatitis A vaccine  Aged Out    Hib vaccine  Aged Out    Meningococcal (ACWY) vaccine  Aged Out       Hemoglobin A1C (%)   Date Value   12/10/2020 12.0   12/02/2019 6.6   08/20/2019 10.0 (H)             ( goal A1C is < 7)   Microalb/Crt.  Ratio (mcg/mg creat)   Date Value   12/15/2020 8     LDL Cholesterol (mg/dL)   Date Value   12/15/2020 42   04/17/2019 22       (goal LDL is <100)   AST (U/L)   Date Value   05/04/2019 26     ALT (U/L)   Date Value   05/04/2019 43 (H)     BUN (mg/dL)   Date Value   08/30/2019 9     BP Readings from Last 3 Encounters:   01/07/21 135/74   12/10/20 128/65   06/08/20 135/74          (goal 120/80)    All Future Testing planned in [facility]  Lab Frequency Next Occurrence   VL ARTERIAL PVR LOWER WO EXERCISE Once 11/18/2021   Cologuard (For External Results Only) Once 64/52/7587   Basic Metabolic Panel Once 90/54/1742   HIV Screen Once 01/07/2022   Hepatitis C Antibody Once 01/07/2022               Patient Active Problem List:     PAD (peripheral artery disease) ([address]     Subacute osteomyelitis of right tibia (HCC)     Metabolic encephalopathy     Abnormal CT of the head     Hepatitis     Acute cerebral infarction associated with systemic hypoxia or ischemia (HCC)     Multi infarct state     Carotid stenosis, bilateral     Right foot drop     Thrombocytosis (HCC)     Acute CVA (cerebrovascular accident) ([address]     Moderate malnutrition ([address]     Cellulitis of right lower extremity     Right-sided extracranial carotid artery stenosis     Essential hypertension     Type 2 diabetes mellitus with diabetic neuropathy, without long-term current use of insulin (HCC)     Leukocytosis     Status post angiography of extremity     Ischemic gangrene ([address]     Pyogenic inflammation of bone (HCC)     Coronary artery disease     Above knee amputation of right lower extremity (HCC)     Pressure injury of right heel, unstageable ([address]     Hx of AKA (above knee amputation), right (HCC)     Erectile dysfunction associated with type 2 diabetes mellitus ([address]

## 2021-08-25 NOTE — TELEPHONE ENCOUNTER
Please address the medication refill and close the encounter. If I can be of assistance, please route to the applicable pool. Thank you. Last visit: 1-7-2021  Last Med refill: 5-  Does patient have enough medication for 72 hours: No:     Next Visit Date:  No future appointments. Health Maintenance   Topic Date Due    Hepatitis C screen  Never done    Diabetic retinal exam  Never done    COVID-19 Vaccine (1) Never done    HIV screen  Never done    Colon cancer screen colonoscopy  Never done    Potassium monitoring  09/28/2020    Creatinine monitoring  09/28/2020    A1C test (Diabetic or Prediabetic)  03/10/2021    Hepatitis B vaccine (3 of 3 - Risk 3-dose series) 05/07/2021    Flu vaccine (1) 09/01/2021    Diabetic foot exam  12/10/2021    Diabetic microalbuminuria test  12/15/2021    Lipid screen  12/15/2021    DTaP/Tdap/Td vaccine (2 - Td or Tdap) 11/11/2025    Pneumococcal 0-64 years Vaccine (2 of 2 - PPSV23) 06/08/2029    Shingles Vaccine  Completed    Hepatitis A vaccine  Aged Out    Hib vaccine  Aged Out    Meningococcal (ACWY) vaccine  Aged Out       Hemoglobin A1C (%)   Date Value   12/10/2020 12.0   12/02/2019 6.6   08/20/2019 10.0 (H)             ( goal A1C is < 7)   Microalb/Crt.  Ratio (mcg/mg creat)   Date Value   12/15/2020 8     LDL Cholesterol (mg/dL)   Date Value   12/15/2020 42   04/17/2019 22       (goal LDL is <100)   AST (U/L)   Date Value   05/04/2019 26     ALT (U/L)   Date Value   05/04/2019 43 (H)     BUN (mg/dL)   Date Value   08/30/2019 9     BP Readings from Last 3 Encounters:   01/07/21 135/74   12/10/20 128/65   06/08/20 135/74          (goal 120/80)    All Future Testing planned in [facility]  Lab Frequency Next Occurrence   VL ARTERIAL PVR LOWER WO EXERCISE Once 11/18/2021   Cologuard (For External Results Only) Once 23/84/1598   Basic Metabolic Panel Once 28/74/0901   HIV Screen Once 01/07/2022   Hepatitis C Antibody Once 01/07/2022 Patient Active Problem List:     PAD (peripheral artery disease) (HCC)     Subacute osteomyelitis of right tibia (HCC)     Metabolic encephalopathy     Abnormal CT of the head     Hepatitis     Acute cerebral infarction associated with systemic hypoxia or ischemia (HCC)     Multi infarct state     Carotid stenosis, bilateral     Right foot drop     Thrombocytosis (HCC)     Acute CVA (cerebrovascular accident) ([address]     Moderate malnutrition ([address]     Cellulitis of right lower extremity     Right-sided extracranial carotid artery stenosis     Essential hypertension     Type 2 diabetes mellitus with diabetic neuropathy, without long-term current use of insulin (HCC)     Leukocytosis     Status post angiography of extremity     Ischemic gangrene ([address]     Pyogenic inflammation of bone (HCC)     Coronary artery disease     Above knee amputation of right lower extremity (HCC)     Pressure injury of right heel, unstageable ([address]     Hx of AKA (above knee amputation), right (HCC)     Erectile dysfunction associated with type 2 diabetes mellitus ([address]

## 2021-08-26 RX ORDER — GABAPENTIN 300 MG/1
CAPSULE ORAL
Qty: 90 CAPSULE | Refills: 1 | Status: SHIPPED | OUTPATIENT
Start: 2021-08-26 | End: 2022-02-17

## 2021-08-26 RX ORDER — CLOPIDOGREL BISULFATE 75 MG/1
TABLET ORAL
Qty: 30 TABLET | Refills: 1 | Status: SHIPPED | OUTPATIENT
Start: 2021-08-26 | End: 2021-10-25 | Stop reason: SDUPTHER

## 2021-08-26 NOTE — PROGRESS NOTES
Physical Therapy  DATE: 2019    NAME: Stephan Hendricks  MRN: [id number]   : 1965    Patient not seen this date for Physical Therapy due to:  [] Blood transfusion in progress  [] Hemodialysis  []  Patient Declined  [] Spine Precautions   [] Strict Bedrest  [] Surgery/ Procedure  [] Testing      [x] Other- per RN, pt not medically appropriate for therapy this date. PT will check back 19. [] PT being discontinued at this time. Patient independent. No further needs. [] PT being discontinued at this time as the patient has been transferred to palliative care. No further needs.     Petty Worthington, PT Dressing: pressure dressing with telfa

## 2021-10-19 DIAGNOSIS — I10 ESSENTIAL HYPERTENSION: ICD-10-CM

## 2021-10-19 DIAGNOSIS — E11.40 TYPE 2 DIABETES MELLITUS WITH DIABETIC NEUROPATHY, WITHOUT LONG-TERM CURRENT USE OF INSULIN (HCC): ICD-10-CM

## 2021-10-19 RX ORDER — METOPROLOL TARTRATE 50 MG/1
50 TABLET, FILM COATED ORAL 2 TIMES DAILY
Qty: 180 TABLET | Refills: 5 | Status: SHIPPED | OUTPATIENT
Start: 2021-10-19

## 2021-10-19 RX ORDER — ATORVASTATIN CALCIUM 40 MG/1
40 TABLET, FILM COATED ORAL NIGHTLY
Qty: 30 TABLET | Refills: 3 | Status: SHIPPED | OUTPATIENT
Start: 2021-10-19

## 2021-10-19 RX ORDER — LISINOPRIL 20 MG/1
20 TABLET ORAL DAILY
Qty: 90 TABLET | Refills: 5 | Status: SHIPPED | OUTPATIENT
Start: 2021-10-19 | End: 2021-11-09 | Stop reason: SDUPTHER

## 2021-10-19 NOTE — TELEPHONE ENCOUNTER
Pt is out of medication schedule for Monday for appt unable to come sooner due to transportation. Please inform if can refill just to date of visit. Thank you. Sent to pharmacy.

## 2021-10-25 DIAGNOSIS — E11.40 TYPE 2 DIABETES MELLITUS WITH DIABETIC NEUROPATHY, WITHOUT LONG-TERM CURRENT USE OF INSULIN (HCC): ICD-10-CM

## 2021-10-25 DIAGNOSIS — I73.9 PAD (PERIPHERAL ARTERY DISEASE) (HCC): ICD-10-CM

## 2021-10-25 NOTE — TELEPHONE ENCOUNTER
Patient cancelled appointment for today    Last visit: 1/7/21  Last Med refill:   Does patient have enough medication for 72 hours: Yes    Next Visit Date:  Future Appointments   Date Time Provider Katelyn Kulkarni   11/9/2021 10:15 AM Shi Carreon MD [address] [place] [address] Maintenance   Topic Date Due    Hepatitis C screen  Never done    Diabetic retinal exam  Never done    COVID-19 Vaccine (1) Never done    HIV screen  Never done    Colon cancer screen colonoscopy  Never done    Potassium monitoring  09/28/2020    Creatinine monitoring  09/28/2020    A1C test (Diabetic or Prediabetic)  03/10/2021    Hepatitis B vaccine (3 of 3 - Risk 3-dose series) 05/07/2021    Flu vaccine (1) 09/01/2021    Diabetic foot exam  12/10/2021    Diabetic microalbuminuria test  12/15/2021    Lipid screen  12/15/2021    DTaP/Tdap/Td vaccine (2 - Td or Tdap) 11/11/2025    Pneumococcal 0-64 years Vaccine (2 of 2 - PPSV23) 06/08/2029    Shingles Vaccine  Completed    Hepatitis A vaccine  Aged Out    Hib vaccine  Aged Out    Meningococcal (ACWY) vaccine  Aged Out       Hemoglobin A1C (%)   Date Value   12/10/2020 12.0   12/02/2019 6.6   08/20/2019 10.0 (H)             ( goal A1C is < 7)   Microalb/Crt.  Ratio (mcg/mg creat)   Date Value   12/15/2020 8     LDL Cholesterol (mg/dL)   Date Value   12/15/2020 42   04/17/2019 22       (goal LDL is <100)   AST (U/L)   Date Value   05/04/2019 26     ALT (U/L)   Date Value   05/04/2019 43 (H)     BUN (mg/dL)   Date Value   08/30/2019 9     BP Readings from Last 3 Encounters:   01/07/21 135/74   12/10/20 128/65   06/08/20 135/74          (goal 120/80)    All Future Testing planned in [facility]  Lab Frequency Next Occurrence   VL ARTERIAL PVR LOWER WO EXERCISE Once 11/18/2021   Cologuard (For External Results Only) Once 57/55/1077   Basic Metabolic Panel Once 37/35/6788   HIV Screen Once 01/07/2022   Hepatitis C Antibody Once 01/07/2022               Patient Active Problem List:     PAD (peripheral artery disease) ([address]     Subacute osteomyelitis of right tibia (HCC)     Metabolic encephalopathy     Abnormal CT of the head     Hepatitis     Acute cerebral infarction associated with systemic hypoxia or ischemia (HCC)     Multi infarct state     Carotid stenosis, bilateral     Right foot drop     Thrombocytosis     Acute CVA (cerebrovascular accident) ([address]     Moderate malnutrition ([address]     Cellulitis of right lower extremity     Right-sided extracranial carotid artery stenosis     Essential hypertension     Type 2 diabetes mellitus with diabetic neuropathy, without long-term current use of insulin (HCC)     Leukocytosis     Status post angiography of extremity     Ischemic gangrene ([address]     Pyogenic inflammation of bone (HCC)     Coronary artery disease     Above knee amputation of right lower extremity (HCC)     Pressure injury of right heel, unstageable ([address]     Hx of AKA (above knee amputation), right (HCC)     Erectile dysfunction associated with type 2 diabetes mellitus ([address]

## 2021-10-26 RX ORDER — CLOPIDOGREL BISULFATE 75 MG/1
75 TABLET ORAL DAILY
Qty: 30 TABLET | Refills: 0 | Status: SHIPPED | OUTPATIENT
Start: 2021-10-26 | End: 2022-02-17

## 2021-11-09 ENCOUNTER — OFFICE VISIT (OUTPATIENT)
Dept: FAMILY MEDICINE CLINIC | Age: 56
End: 2021-11-09
Payer: MEDICAID

## 2021-11-09 VITALS
BODY MASS INDEX: 29.92 KG/M2 | SYSTOLIC BLOOD PRESSURE: 180 MMHG | DIASTOLIC BLOOD PRESSURE: 92 MMHG | HEART RATE: 86 BPM | WEIGHT: 179.8 LBS

## 2021-11-09 DIAGNOSIS — E11.40 TYPE 2 DIABETES MELLITUS WITH DIABETIC NEUROPATHY, WITHOUT LONG-TERM CURRENT USE OF INSULIN (HCC): Primary | ICD-10-CM

## 2021-11-09 DIAGNOSIS — I10 ESSENTIAL HYPERTENSION: ICD-10-CM

## 2021-11-09 LAB — HBA1C MFR BLD: 12.9 %

## 2021-11-09 PROCEDURE — 3046F HEMOGLOBIN A1C LEVEL >9.0%: CPT

## 2021-11-09 PROCEDURE — 99213 OFFICE O/P EST LOW 20 MIN: CPT

## 2021-11-09 PROCEDURE — G8484 FLU IMMUNIZE NO ADMIN: HCPCS

## 2021-11-09 PROCEDURE — 83036 HEMOGLOBIN GLYCOSYLATED A1C: CPT

## 2021-11-09 PROCEDURE — 3017F COLORECTAL CA SCREEN DOC REV: CPT

## 2021-11-09 PROCEDURE — 2022F DILAT RTA XM EVC RTNOPTHY: CPT

## 2021-11-09 PROCEDURE — G8427 DOCREV CUR MEDS BY ELIG CLIN: HCPCS

## 2021-11-09 PROCEDURE — 1036F TOBACCO NON-USER: CPT

## 2021-11-09 PROCEDURE — G8417 CALC BMI ABV UP PARAM F/U: HCPCS

## 2021-11-09 RX ORDER — LISINOPRIL 20 MG/1
40 TABLET ORAL DAILY
Qty: 90 TABLET | Refills: 5 | Status: SHIPPED | OUTPATIENT
Start: 2021-11-09 | End: 2021-12-23 | Stop reason: ALTCHOICE

## 2021-11-09 RX ORDER — INSULIN GLARGINE 100 [IU]/ML
24 INJECTION, SOLUTION SUBCUTANEOUS NIGHTLY
Qty: 5 PEN | Refills: 3 | Status: SHIPPED | OUTPATIENT
Start: 2021-11-09 | End: 2022-01-13

## 2021-11-09 ASSESSMENT — ENCOUNTER SYMPTOMS
VOMITING: 0
NAUSEA: 0
ABDOMINAL PAIN: 0
DIARRHEA: 0
ABDOMINAL DISTENTION: 0
CONSTIPATION: 0
SHORTNESS OF BREATH: 0

## 2021-11-09 NOTE — PROGRESS NOTES
Subjective:    Hilary Larry is a 61 y.o. male with  has a past medical history of CAD (coronary artery disease), Cardiac arrest ([facility] Diabetes mellitus ([facility] Hyperlipidemia, Hypertension, Osteolysis, PEA (Pulseless electrical activity) ([facility] PVD (peripheral vascular disease) ([facility] and Wound, open. Presented to the office today for:  Chief Complaint   Patient presents with    Diabetes       HPI  Subjective:       Hilary Larry is an 61 y.o. male who presents for follow up of diabetes. Current symptoms include: paresthesia of the feet and polyuria. Patient denies hypoglycemia  and vomiting. Home sugars: BGs range between 130 and 180. Last dilated eye exam about a year and a half ago. Patient reports stopping Victoza in July. States he was having an upset stomach with acid-like taste in his mouth. Patient reports taking metformin 500 mg and lantus 24 units. States he ran out of the admelog. Review of Systems   Constitutional: Negative for chills, fatigue and fever. Respiratory: Negative for shortness of breath. Cardiovascular: Negative for chest pain and leg swelling. Gastrointestinal: Negative for abdominal distention, abdominal pain, constipation, diarrhea, nausea and vomiting. Endocrine: Positive for polydipsia and polyuria. Genitourinary: Negative for difficulty urinating.      The patient has a   Family History   Problem Relation Age of Onset    Heart Disease Mother         CAD WITH STENT    Diabetes Mother     Stroke Mother     Diabetes Father     Stroke Father     Heart Disease Father         CAD    High Blood Pressure Father     High Blood Pressure Sister     Diabetes Brother     Heart Disease Brother     High Blood Pressure Brother        Objective:    BP (!) 180/92   Pulse 86   Wt 179 lb 12.8 oz (81.6 kg)   BMI 29.92 kg/m²    BP Readings from Last 3 Encounters:   11/09/21 (!) 180/92   01/07/21 135/74   12/10/20 128/65       Physical Exam    Lab Results   Component Value Date    WBC 10.6 2019    HGB 10.1 (L) 2019    HCT 32.1 (L) 2019     (H) 2019    CHOL 109 12/15/2020    TRIG 210 (H) 12/15/2020    HDL 25 (L) 12/15/2020    ALT 43 (H) 2019    AST 26 2019     (L) 2019    K 4.0 2019    CL 92 (L) 2019    CREATININE 0.58 2019    BUN 9 2019    CO2 23 2019    INR 1.2 2019    LABA1C 12.9 2021    LABMICR 8 12/15/2020     Lab Results   Component Value Date    CALCIUM 9.1 2019    PHOS 3.4 2019     Lab Results   Component Value Date    LDLCHOLESTEROL 42 12/15/2020       Assessment and Plan:    1. Type 2 diabetes mellitus with diabetic neuropathy, without long-term current use of insulin (HCC)  - POCT glycosylated hemoglobin (Hb A1C)  - ADMELOG 100 UNIT/ML injection vial; INJECT SUBCUTANEOUSLY BEFORE MEALS AS DIRECTED PER SLIDING SCALE: SEE ATTACHED FOR SLIDING SCALE DIRECTION. Dispense: 10 mL; Refill: 2  - metFORMIN (GLUCOPHAGE) 500 MG tablet; Take 2 tablets by mouth daily  Dispense: 30 tablet; Refill: 0    2. Essential hypertension  - lisinopril (PRINIVIL;ZESTRIL) 20 MG tablet; Take 2 tablets by mouth daily  Dispense: 90 tablet; Refill: 5          Requested Prescriptions     Signed Prescriptions Disp Refills    lisinopril (PRINIVIL;ZESTRIL) 20 MG tablet 90 tablet 5     Sig: Take 2 tablets by mouth daily    insulin glargine (LANTUS SOLOSTAR) 100 UNIT/ML injection pen 5 pen 3     Sig: Inject 24 Units into the skin nightly    ADMELOG 100 UNIT/ML injection vial 10 mL 2     Sig: INJECT SUBCUTANEOUSLY BEFORE MEALS AS DIRECTED PER SLIDING SCALE: SEE ATTACHED FOR SLIDING SCALE DIRECTION.     metFORMIN (GLUCOPHAGE) 500 MG tablet 30 tablet 0     Sig: Take 2 tablets by mouth daily    Insulin Syringe-Needle U-100 30G X 5/16\" 1 ML MISC 100 each 3     Si each by Does not apply route daily       Medications Discontinued During This Encounter   Medication Reason    lisinopril

## 2021-11-09 NOTE — PROGRESS NOTES
Visit Information    Have you changed or started any medications since your last visit including any over-the-counter medicines, vitamins, or herbal medicines? no   Are you having any side effects from any of your medications? -  no  Have you stopped taking any of your medications? Is so, why? -  no    Have you seen any other physician or provider since your last visit? No  Have you had any other diagnostic tests since your last visit? No  Have you been seen in the emergency room and/or had an admission to a hospital since we last saw you? No  Have you had your routine dental cleaning in the past 6 months? no    Have you activated your [employer] account? If not, what are your barriers?  No:      Patient Care Team:  Gerald Miller MD as PCP - General (Family Medicine)  Gerald Miller MD as PCP - [facility]    Medical History Review  Past Medical, Family, and Social History reviewed and does not contribute to the patient presenting condition    Health Maintenance   Topic Date Due    Hepatitis C screen  Never done    Diabetic retinal exam  Never done    COVID-19 Vaccine (1) Never done    HIV screen  Never done    Colon cancer screen colonoscopy  Never done    Potassium monitoring  09/28/2020    Creatinine monitoring  09/28/2020    A1C test (Diabetic or Prediabetic)  03/10/2021    Hepatitis B vaccine (3 of 3 - Risk 3-dose series) 05/07/2021    Flu vaccine (1) 09/01/2021    Diabetic foot exam  12/10/2021    Diabetic microalbuminuria test  12/15/2021    Lipid screen  12/15/2021    DTaP/Tdap/Td vaccine (2 - Td or Tdap) 11/11/2025    Pneumococcal 0-64 years Vaccine (2 of 2 - PPSV23) 06/08/2029    Shingles Vaccine  Completed    Hepatitis A vaccine  Aged Out    Hib vaccine  Aged Out    Meningococcal (ACWY) vaccine  Aged Out

## 2021-11-10 NOTE — PROGRESS NOTES
Attending Physician Statement  I have seen and discussed the care of Michael Black 61 y.o. male, including pertinent history and exam findings, with the resident Dr. Duane Bridge, MD.    History and Exam:   Chief Complaint   Patient presents with    Diabetes       Past Medical History:   Diagnosis Date    CAD (coronary artery disease) 04/07/2019    triple bypass    Cardiac arrest ([facility] 04/07/2019    Diabetes mellitus ([facility] 04/2019    ON RX    Hyperlipidemia 04/2019    ON RX HAS NOT HAD SINCE 06/30/2019    Hypertension 04/2019    ON RX NONE SINCE 06/2019    Osteolysis 04/07/2019    R Knee hardware    PEA (Pulseless electrical activity) ([facility] 04/07/2019    PVD (peripheral vascular disease) ([facility] 04/07/2019    Wound, open 09/2019    RIGHT STUMP DRAINING MOD AMT BLOODY DRAINAGE     No Known Allergies   I have seen and examined the patient and the key elements of the encounter have been performed by me. BP Readings from Last 3 Encounters:   11/09/21 (!) 180/92   01/07/21 135/74   12/10/20 128/65     BP (!) 180/92   Pulse 86   Wt 179 lb 12.8 oz (81.6 kg)   BMI 29.92 kg/m²   Lab Results   Component Value Date    WBC 10.6 09/03/2019    HGB 10.1 (L) 08/30/2019    HCT 32.1 (L) 08/30/2019     (H) 08/30/2019    CHOL 109 12/15/2020    TRIG 210 (H) 12/15/2020    HDL 25 (L) 12/15/2020    ALT 43 (H) 05/04/2019    AST 26 05/04/2019     (L) 08/30/2019    K 4.0 08/30/2019    CL 92 (L) 08/30/2019    CREATININE 0.58 09/28/2019    BUN 9 08/30/2019    CO2 23 08/30/2019    INR 1.2 04/25/2019    LABA1C 12.9 11/09/2021    LABMICR 8 12/15/2020     Lab Results   Component Value Date    LABALBU 3.1 (L) 05/04/2019     No results found for: IRON, TIBC, FERRITIN  Lab Results   Component Value Date    LDLCHOLESTEROL 42 12/15/2020     I agree with the assessment, plan and the diagnosis of    Diagnosis Orders   1.  Type 2 diabetes mellitus with diabetic neuropathy, without long-term current use of insulin (HCC)  POCT glycosylated hemoglobin (Hb A1C)    ADMELOG 100 UNIT/ML injection vial    metFORMIN (GLUCOPHAGE) 500 MG tablet   2. Essential hypertension  lisinopril (PRINIVIL;ZESTRIL) 20 MG tablet    . I agree with orders as documented by the resident. More than 25 minutes spent  in face to face encounter with the patient and more than half in counseling. Patient's questions were answered. Patient Voiced understanding to the counseling. Return in about 1 week (around 11/16/2021) for BP check.    (GC Modifier)-Dr. Philip Veliz MD

## 2021-11-16 ENCOUNTER — OFFICE VISIT (OUTPATIENT)
Dept: FAMILY MEDICINE CLINIC | Age: 56
End: 2021-11-16
Payer: MEDICAID

## 2021-11-16 VITALS
HEART RATE: 73 BPM | SYSTOLIC BLOOD PRESSURE: 199 MMHG | BODY MASS INDEX: 29.75 KG/M2 | WEIGHT: 178.8 LBS | DIASTOLIC BLOOD PRESSURE: 80 MMHG

## 2021-11-16 DIAGNOSIS — I25.10 CORONARY ARTERY DISEASE INVOLVING NATIVE HEART WITHOUT ANGINA PECTORIS, UNSPECIFIED VESSEL OR LESION TYPE: ICD-10-CM

## 2021-11-16 DIAGNOSIS — Z12.11 COLON CANCER SCREENING: ICD-10-CM

## 2021-11-16 DIAGNOSIS — I73.9 PAD (PERIPHERAL ARTERY DISEASE) (HCC): ICD-10-CM

## 2021-11-16 DIAGNOSIS — I10 ESSENTIAL HYPERTENSION: Primary | ICD-10-CM

## 2021-11-16 DIAGNOSIS — E11.40 TYPE 2 DIABETES MELLITUS WITH DIABETIC NEUROPATHY, WITHOUT LONG-TERM CURRENT USE OF INSULIN (HCC): ICD-10-CM

## 2021-11-16 PROCEDURE — 99213 OFFICE O/P EST LOW 20 MIN: CPT | Performed by: STUDENT IN AN ORGANIZED HEALTH CARE EDUCATION/TRAINING PROGRAM

## 2021-11-16 RX ORDER — CHLORTHALIDONE 25 MG/1
12.5 TABLET ORAL DAILY
Qty: 30 TABLET | Refills: 3 | Status: CANCELLED | OUTPATIENT
Start: 2021-11-16

## 2021-11-16 ASSESSMENT — ENCOUNTER SYMPTOMS
GASTROINTESTINAL NEGATIVE: 1
RESPIRATORY NEGATIVE: 1

## 2021-11-16 NOTE — PROGRESS NOTES
Attending Physician Statement  I have discussed the care of Carlrenincluding pertinent history and exam findings,  with the resident. I have reviewed the key elements of all parts of the encounter with the resident. I agree with the assessment, plan and orders as documented by the resident. (GE Modifier)    HTN- Systolic- Lisinopril increased to 40 mg.   PAD- L Carotid Artery Stenosis- Vascular FU advised  CAD- refer to Cardiology  -  Cologuard

## 2021-11-16 NOTE — PROGRESS NOTES
Subjective:    Daren Chandra is a 61 y.o. male with  has a past medical history of CAD (coronary artery disease), Cardiac arrest ([facility] Diabetes mellitus ([facility] Hyperlipidemia, Hypertension, Osteolysis, PEA (Pulseless electrical activity) ([facility] PVD (peripheral vascular disease) ([facility] and Wound, open. Presented to the office today for:  Chief Complaint   Patient presents with    Follow-up     BP       HPI  Hypertension: Uncontrolled. Patient here for follow-up of elevated blood pressure. He is exercising and is adherent to low salt diet. Blood pressure is not well controlled at home. Cardiac symptoms none. Patient denies chest pain, chest pressure/discomfort, irregular heart beat and lower extremity edema. Cardiovascular risk factors: advanced age (older than 54 for men, 72 for women), diabetes mellitus, dyslipidemia, hypertension, male gender and smoking/ tobacco exposure, heart disease. Use of agents associated with hypertension: none. History of target organ damage: none. Currently takes following medications for hypertension: Lisinopril 20 mg daily, Lopressor 50 mg twice daily  Reports he is compliant: no    Patient has peripheral arterial disease. His blood pressure is 190s/100s on right arm and 140/90s on left arm. Review of Systems   Constitutional: Negative. HENT: Negative. Respiratory: Negative. Cardiovascular: Negative. Gastrointestinal: Negative. Genitourinary: Negative. Musculoskeletal: Negative. Skin: Negative. Neurological: Negative. Psychiatric/Behavioral: Negative.         The patient has a   Family History   Problem Relation Age of Onset    Heart Disease Mother         CAD WITH STENT    Diabetes Mother     Stroke Mother     Diabetes Father     Stroke Father     Heart Disease Father         CAD    High Blood Pressure Father     High Blood Pressure Sister     Diabetes Brother     Heart Disease Brother     High Blood Pressure Brother Objective:    BP (!) 199/80 (Site: Right Upper Arm, Position: Sitting, Cuff Size: Medium Adult)   Pulse 73   Wt 178 lb 12.8 oz (81.1 kg)   BMI 29.75 kg/m²    BP Readings from Last 3 Encounters:   11/16/21 (!) 199/80   11/09/21 (!) 180/92   01/07/21 135/74       Physical Exam  Vitals reviewed. Constitutional:       General: He is not in acute distress. Appearance: Normal appearance. He is not ill-appearing. HENT:      Head: Normocephalic and atraumatic. Cardiovascular:      Rate and Rhythm: Normal rate and regular rhythm. Pulses: Normal pulses. Heart sounds: Murmur (Pansystolic at upper left sternal border) heard. Pulmonary:      Effort: Pulmonary effort is normal. No respiratory distress. Breath sounds: Normal breath sounds. No wheezing or rales. Musculoskeletal:         General: Normal range of motion. Comments: Above-knee amputation right leg   Skin:     General: Skin is warm and dry. Capillary Refill: Capillary refill takes less than 2 seconds. Neurological:      General: No focal deficit present. Mental Status: He is alert and oriented to person, place, and time. Psychiatric:         Mood and Affect: Mood normal.         Lab Results   Component Value Date    WBC 10.6 09/03/2019    HGB 10.1 (L) 08/30/2019    HCT 32.1 (L) 08/30/2019     (H) 08/30/2019    CHOL 109 12/15/2020    TRIG 210 (H) 12/15/2020    HDL 25 (L) 12/15/2020    ALT 43 (H) 05/04/2019    AST 26 05/04/2019     (L) 08/30/2019    K 4.0 08/30/2019    CL 92 (L) 08/30/2019    CREATININE 0.58 09/28/2019    BUN 9 08/30/2019    CO2 23 08/30/2019    INR 1.2 04/25/2019    LABA1C 12.9 11/09/2021    LABMICR 8 12/15/2020     Lab Results   Component Value Date    CALCIUM 9.1 08/30/2019    PHOS 3.4 07/04/2019     Lab Results   Component Value Date    LDLCHOLESTEROL 42 12/15/2020       Assessment and Plan:    1.  Essential hypertension  Patient has PAD, has blood pressure discrepancy between 2 arms.  Will take blood pressure from left arm as reference point. Blood pressure running high today because of noncompliance. Advised patient to start taking lisinopril 40 mg daily. If blood pressure continues to stay high, will start chlorthalidone 12.5 mg daily next appointment. 2. PAD (peripheral artery disease) ([facility])  Advised to follow-up with vascular surgery, Dr. Bhargav Casas    3. Type 2 diabetes mellitus with diabetic neuropathy, without long-term current use of insulin ([facility])  Continue same regimen. Follow-up with ophthalmology for annual screening.  - Vanessa Choi MD, Ophthalmology, [place]    4. Coronary artery disease involving native heart without angina pectoris, unspecified vessel or lesion type  History of CAD, pansystolic murmur.  - WAYLON - Audrey Herrera DO, Cardiology, [place]    5. Colon cancer screening  - Cologuard; Future        Requested Prescriptions      No prescriptions requested or ordered in this encounter       There are no discontinued medications. [place] Patient received counseling on the following healthy behaviors: nutrition, exercise and medication adherence    Discussed use,benefit, and side effects of prescribed medications. Barriers to medication compliance addressed. All patient questions answered. Pt voiced understanding. Return in about 3 weeks (around 12/7/2021). Disclaimer: Some orall of this note was transcribed using voice-recognition software. This may cause typographical errors occasionally. Although all effort is made to fix these errors, please do not hesitate to contact our office if there Dale Burton concern with the understanding of this note.

## 2021-11-16 NOTE — PROGRESS NOTES
Visit Information    Have you changed or started any medications since your last visit including any over-the-counter medicines, vitamins, or herbal medicines? no   Are you having any side effects from any of your medications? -  no  Have you stopped taking any of your medications? Is so, why? -  no    Have you seen any other physician or provider since your last visit? No  Have you had any other diagnostic tests since your last visit? No  Have you been seen in the emergency room and/or had an admission to a hospital since we last saw you? No  Have you had your routine dental cleaning in the past 6 months? no    Have you activated your [employer] account? If not, what are your barriers?  No:      Patient Care Team:  Harmony Vallejo MD as PCP - General (Emergency Medicine)    Medical History Review  Past Medical, Family, and Social History reviewed and does not contribute to the patient presenting condition    Health Maintenance   Topic Date Due    Hepatitis C screen  Never done    Diabetic retinal exam  Never done    COVID-19 Vaccine (1) Never done    HIV screen  Never done    Colon cancer screen colonoscopy  Never done    Potassium monitoring  09/28/2020    Creatinine monitoring  09/28/2020    Hepatitis B vaccine (3 of 3 - Risk 3-dose series) 05/07/2021    Flu vaccine (1) 09/01/2021    Diabetic foot exam  12/10/2021    Diabetic microalbuminuria test  12/15/2021    Lipid screen  12/15/2021    A1C test (Diabetic or Prediabetic)  02/09/2022    DTaP/Tdap/Td vaccine (2 - Td or Tdap) 11/11/2025    Pneumococcal 0-64 years Vaccine (2 of 2 - PPSV23) 06/08/2029    Shingles Vaccine  Completed    Hepatitis A vaccine  Aged Out    Hib vaccine  Aged Out    Meningococcal (ACWY) vaccine  Aged Out

## 2021-11-16 NOTE — PATIENT INSTRUCTIONS
Thank you for letting us take care of you today. We hope all your questions were addressed. If a question was overlooked or something else comes to mind after you return home, please contact a member of your Care Team listed below. Your Care Team at [place] [address] is Team #3  Sky Lin MD (Faculty)  Sarah Mcdonald MD (Faculty  Tucker Hubbard MD (Resident)  Cuong Leahy (Resident)   Aida Sanchez MD (Resident)  Ras Rivera MD (Resident)  Sofia Stephens., GLORIA Valenzuela., GLORIA Eaton., Abbe Garrett., Gwendolyn Paredes ([facility])  [facility], [address] (Clinical Practice Manager)  [address] (Clinical Pharmacist)     Office phone number: [phone]    If you need to get in right away due to illness, please be advised we have \"Same Day\" appointments available Monday-Friday. Please call us at [phone] option #3 to schedule your \"Same Day\" appointment.

## 2021-12-23 ENCOUNTER — OFFICE VISIT (OUTPATIENT)
Dept: FAMILY MEDICINE CLINIC | Age: 56
End: 2021-12-23
Payer: MEDICAID

## 2021-12-23 ENCOUNTER — TELEPHONE (OUTPATIENT)
Dept: FAMILY MEDICINE CLINIC | Age: 56
End: 2021-12-23

## 2021-12-23 VITALS
DIASTOLIC BLOOD PRESSURE: 105 MMHG | HEIGHT: 65 IN | SYSTOLIC BLOOD PRESSURE: 170 MMHG | TEMPERATURE: 96.6 F | BODY MASS INDEX: 29.82 KG/M2 | HEART RATE: 112 BPM | WEIGHT: 179 LBS

## 2021-12-23 DIAGNOSIS — E11.40 TYPE 2 DIABETES MELLITUS WITH DIABETIC NEUROPATHY, WITHOUT LONG-TERM CURRENT USE OF INSULIN (HCC): Primary | ICD-10-CM

## 2021-12-23 DIAGNOSIS — I10 ESSENTIAL HYPERTENSION: ICD-10-CM

## 2021-12-23 DIAGNOSIS — I73.9 PAD (PERIPHERAL ARTERY DISEASE) (HCC): ICD-10-CM

## 2021-12-23 LAB — HBA1C MFR BLD: 14 %

## 2021-12-23 PROCEDURE — G8417 CALC BMI ABV UP PARAM F/U: HCPCS | Performed by: STUDENT IN AN ORGANIZED HEALTH CARE EDUCATION/TRAINING PROGRAM

## 2021-12-23 PROCEDURE — G8427 DOCREV CUR MEDS BY ELIG CLIN: HCPCS | Performed by: STUDENT IN AN ORGANIZED HEALTH CARE EDUCATION/TRAINING PROGRAM

## 2021-12-23 PROCEDURE — 3017F COLORECTAL CA SCREEN DOC REV: CPT | Performed by: STUDENT IN AN ORGANIZED HEALTH CARE EDUCATION/TRAINING PROGRAM

## 2021-12-23 PROCEDURE — 2022F DILAT RTA XM EVC RTNOPTHY: CPT | Performed by: STUDENT IN AN ORGANIZED HEALTH CARE EDUCATION/TRAINING PROGRAM

## 2021-12-23 PROCEDURE — 4004F PT TOBACCO SCREEN RCVD TLK: CPT | Performed by: STUDENT IN AN ORGANIZED HEALTH CARE EDUCATION/TRAINING PROGRAM

## 2021-12-23 PROCEDURE — 83036 HEMOGLOBIN GLYCOSYLATED A1C: CPT | Performed by: STUDENT IN AN ORGANIZED HEALTH CARE EDUCATION/TRAINING PROGRAM

## 2021-12-23 PROCEDURE — 99213 OFFICE O/P EST LOW 20 MIN: CPT | Performed by: STUDENT IN AN ORGANIZED HEALTH CARE EDUCATION/TRAINING PROGRAM

## 2021-12-23 PROCEDURE — G8484 FLU IMMUNIZE NO ADMIN: HCPCS | Performed by: STUDENT IN AN ORGANIZED HEALTH CARE EDUCATION/TRAINING PROGRAM

## 2021-12-23 RX ORDER — LISINOPRIL AND HYDROCHLOROTHIAZIDE 25; 20 MG/1; MG/1
2 TABLET ORAL DAILY
Qty: 60 TABLET | Refills: 3 | Status: SHIPPED | OUTPATIENT
Start: 2021-12-23

## 2021-12-23 ASSESSMENT — ENCOUNTER SYMPTOMS
WHEEZING: 0
VOMITING: 0
ABDOMINAL DISTENTION: 0
SHORTNESS OF BREATH: 0
CONSTIPATION: 0
COUGH: 0
DIARRHEA: 0
NAUSEA: 0
ABDOMINAL PAIN: 0

## 2021-12-23 NOTE — TELEPHONE ENCOUNTER
PA request for Admelog     PA processed and submitted to pt insurance, waiting for response in regards to coverage

## 2021-12-23 NOTE — PROGRESS NOTES
DIABETES and HYPERTENSION visit    BP Readings from Last 3 Encounters:   11/16/21 (!) 199/80   11/09/21 (!) 180/92   01/07/21 135/74        Hemoglobin A1C (%)   Date Value   11/09/2021 12.9   12/10/2020 12.0   12/02/2019 6.6     Microalb/Crt. Ratio (mcg/mg creat)   Date Value   12/15/2020 8     LDL Cholesterol (mg/dL)   Date Value   12/15/2020 42     HDL (mg/dL)   Date Value   12/15/2020 25 (L)     BUN (mg/dL)   Date Value   08/30/2019 9     CREATININE (mg/dL)   Date Value   08/30/2019 0.60 (L)     POC Creatinine (mg/dL)   Date Value   09/28/2019 0.58     Glucose (mg/dL)   Date Value   08/30/2019 321 (H)            Have you changed or started any medications since your last visit including any over-the-counter medicines, vitamins, or herbal medicines? no   Have you stopped taking any of your medications? Is so, why? -  no  Are you having any side effects from any of your medications? - no    Have you seen any other physician or provider since your last visit?  no   Have you had any other diagnostic tests since your last visit?  no   Have you been seen in the emergency room and/or had an admission in a hospital since we last saw you?  no   Have you had your routine dental cleaning in the past 6 months?  no     Have you had your annual diabetic retinal (eye) exam? No   (ensure copy of exam is in the chart)    Do you have an active MyChart account? If no, what is the barrier?   No: pending    Patient Care Team:  Jack Riley MD as PCP - General (Emergency Medicine)    Medical History Review  Past Medical, Family, and Social History reviewed and does contribute to the patient presenting condition    Health Maintenance   Topic Date Due    Hepatitis C screen  Never done    COVID-19 Vaccine (1) Never done    HIV screen  Never done    Diabetic retinal exam  Never done    Colon cancer screen colonoscopy  Never done    Potassium monitoring  09/28/2020    Creatinine monitoring  09/28/2020    Hepatitis B vaccine (3 of 3 - Risk 3-dose series) 05/07/2021    Flu vaccine (1) 09/01/2021    Diabetic foot exam  12/10/2021    Diabetic microalbuminuria test  12/15/2021    Lipid screen  12/15/2021    A1C test (Diabetic or Prediabetic)  02/09/2022    DTaP/Tdap/Td vaccine (2 - Td or Tdap) 11/11/2025    Pneumococcal 0-64 years Vaccine (2 of 2 - PPSV23) 06/08/2029    Shingles Vaccine  Completed    Hepatitis A vaccine  Aged Out    Hib vaccine  Aged Out    Meningococcal (ACWY) vaccine  Aged Out

## 2021-12-23 NOTE — PROGRESS NOTES
the sliding scale coverage. States sometimes he checks his sugars in the morning and they usually run in 130s-150s. Review of Systems   Constitutional: Negative for chills and fever. HENT: Negative. Respiratory: Negative for cough, shortness of breath and wheezing. Cardiovascular: Negative for chest pain, palpitations and leg swelling. Gastrointestinal: Negative for abdominal distention, abdominal pain, constipation, diarrhea, nausea and vomiting. Genitourinary: Negative. Musculoskeletal: Negative. Skin: Negative. Neurological: Positive for numbness and headaches. Psychiatric/Behavioral: Negative. Vitals:    12/23/21 0822   BP: (!) 170/105   Pulse: 112   Temp:        Physical Exam  Vitals and nursing note reviewed. Constitutional:       General: He is not in acute distress. Appearance: Normal appearance. He is not ill-appearing. HENT:      Head: Normocephalic and atraumatic. Cardiovascular:      Rate and Rhythm: Regular rhythm. Tachycardia present. Pulses: Normal pulses. Heart sounds: Murmur heard. Pulmonary:      Effort: Pulmonary effort is normal. No respiratory distress. Breath sounds: Normal breath sounds. No wheezing. Abdominal:      General: Abdomen is flat. Bowel sounds are normal.      Palpations: Abdomen is soft. Musculoskeletal:         General: No swelling or tenderness. Normal range of motion. Skin:     General: Skin is warm and dry. Capillary Refill: Capillary refill takes less than 2 seconds. Coloration: Skin is not jaundiced or pale. Findings: No erythema. Neurological:      General: No focal deficit present. Mental Status: He is alert and oriented to person, place, and time. Motor: No weakness. Psychiatric:         Mood and Affect: Mood normal.            Diagnosis Orders   1.  Type 2 diabetes mellitus with diabetic neuropathy, without long-term current use of insulin (HCC)  POCT glycosylated hemoglobin (Hb A1C)    Basic Metabolic Panel   2. PAD (peripheral artery disease) ([facility] [address]     3. Essential hypertension  lisinopril-hydroCHLOROthiazide (PRINZIDE;ZESTORETIC) 20-25 MG per tablet       Assessment and plan:  1.)  DM2  Patient required priorAuth for Admelog. Prior Auth started. His A1c has trended up, he was not taking his sliding scale insulin. Plan is to check his home sugars once in the morning and once at bedtime. Currently he takes Lantus 24 units at bedtime. Possibly we will need to add Lantus in the morning or increase the dose of Lantus at bedtime. Instructed patient to chart his home sugars and meet again in 2 weeks. 2.)  Hypertension  Patient has PAD, he has blood pressure discrepancies between 2 arms. On the right arm his blood pressure is 200/105 and on left arm his blood pressure is 156/98. He is going to make an appointment with vascular and cardiology. Today, we will switch his medication from lisinopril to Prinzide and see him back in 2 weeks.         Vamshi Barlow MD  Family Medicine PGY-2  12/23/21 at 11:29 AM

## 2021-12-23 NOTE — PATIENT INSTRUCTIONS
Thank you for letting us take care of you today. We hope all your questions were addressed. If a question was overlooked or something else comes to mind after you return home, please contact a member of your Care Team listed below. Your Care Team at [place] [address] is Team #3  Ravinder Brantley MD (Faculty)  Pati Arzola MD (Faculty  Dorian Dance, MD (Resident)  Marline Marcos (Resident)   Shi Carreon MD (Resident)  Marianela Hayden MD (Resident)  Haley Romero., GLORIA Escobedo., GLORIA Martinez., Jeimy Bull., Jarett Flynn ([address] [place] [address])  Billy Castano (Clinical Practice Manager)  Samreen Salgado [facility] (Clinical Pharmacist)     Office phone number: [phone]    If you need to get in right away due to illness, please be advised we have \"Same Day\" appointments available Monday-Friday. Please call us at [phone] option #3 to schedule your \"Same Day\" appointment.

## 2022-01-05 DIAGNOSIS — E11.40 TYPE 2 DIABETES MELLITUS WITH DIABETIC NEUROPATHY, WITHOUT LONG-TERM CURRENT USE OF INSULIN (HCC): Primary | ICD-10-CM

## 2022-01-05 RX ORDER — INSULIN LISPRO 100 [IU]/ML
INJECTION, SOLUTION INTRAVENOUS; SUBCUTANEOUS
Qty: 3 PEN | Refills: 5 | Status: SHIPPED | OUTPATIENT
Start: 2022-01-05

## 2022-01-05 NOTE — TELEPHONE ENCOUNTER
Admelog discontinued and started him on Humalog. Please inform patient to  prescription from [facility] and start taking.

## 2022-01-05 NOTE — PROGRESS NOTES
Prior Auth denied for Admelog. We will discontinue Admelog and start him on Humalog. Prescription sent to [facility].

## 2022-01-13 ENCOUNTER — OFFICE VISIT (OUTPATIENT)
Dept: FAMILY MEDICINE CLINIC | Age: 57
End: 2022-01-13
Payer: MEDICAID

## 2022-01-13 ENCOUNTER — HOSPITAL ENCOUNTER (OUTPATIENT)
Age: 57
Setting detail: SPECIMEN
Discharge: HOME | End: 2022-01-13

## 2022-01-13 VITALS
HEART RATE: 106 BPM | SYSTOLIC BLOOD PRESSURE: 135 MMHG | BODY MASS INDEX: 29.29 KG/M2 | TEMPERATURE: 97.3 F | DIASTOLIC BLOOD PRESSURE: 76 MMHG | WEIGHT: 176 LBS

## 2022-01-13 DIAGNOSIS — I10 ESSENTIAL HYPERTENSION: ICD-10-CM

## 2022-01-13 DIAGNOSIS — E11.40 TYPE 2 DIABETES MELLITUS WITH DIABETIC NEUROPATHY, WITHOUT LONG-TERM CURRENT USE OF INSULIN (HCC): ICD-10-CM

## 2022-01-13 DIAGNOSIS — E11.40 TYPE 2 DIABETES MELLITUS WITH DIABETIC NEUROPATHY, WITHOUT LONG-TERM CURRENT USE OF INSULIN (HCC): Primary | ICD-10-CM

## 2022-01-13 DIAGNOSIS — I73.9 PAD (PERIPHERAL ARTERY DISEASE) (HCC): ICD-10-CM

## 2022-01-13 LAB
ANION GAP SERPL CALCULATED.3IONS-SCNC: 17 MMOL/L (ref 9–17)
BUN BLDV-MCNC: 57 MG/DL (ref 6–20)
BUN/CREAT BLD: ABNORMAL (ref 9–20)
CALCIUM SERPL-MCNC: 10.5 MG/DL (ref 8.6–10.4)
CHLORIDE BLD-SCNC: 97 MMOL/L (ref 98–107)
CHOLESTEROL/HDL RATIO: 10.1
CHOLESTEROL: 242 MG/DL
CO2: 15 MMOL/L (ref 20–31)
CREAT SERPL-MCNC: 1.92 MG/DL (ref 0.7–1.2)
GFR AFRICAN AMERICAN: 44 ML/MIN
GFR NON-AFRICAN AMERICAN: 36 ML/MIN
GFR SERPL CREATININE-BSD FRML MDRD: ABNORMAL ML/MIN/{1.73_M2}
GFR SERPL CREATININE-BSD FRML MDRD: ABNORMAL ML/MIN/{1.73_M2}
GLUCOSE BLD-MCNC: 307 MG/DL (ref 70–99)
HDLC SERPL-MCNC: 24 MG/DL
HEPATITIS C ANTIBODY: NONREACTIVE
HIV AG/AB: NONREACTIVE
LDL CHOLESTEROL DIRECT: 138 MG/DL
LDL CHOLESTEROL: ABNORMAL MG/DL (ref 0–130)
POTASSIUM SERPL-SCNC: 4.3 MMOL/L (ref 3.7–5.3)
SODIUM BLD-SCNC: 129 MMOL/L (ref 135–144)
TRIGL SERPL-MCNC: 637 MG/DL
VLDLC SERPL CALC-MCNC: ABNORMAL MG/DL (ref 1–30)

## 2022-01-13 PROCEDURE — G8417 CALC BMI ABV UP PARAM F/U: HCPCS | Performed by: STUDENT IN AN ORGANIZED HEALTH CARE EDUCATION/TRAINING PROGRAM

## 2022-01-13 PROCEDURE — 3017F COLORECTAL CA SCREEN DOC REV: CPT | Performed by: STUDENT IN AN ORGANIZED HEALTH CARE EDUCATION/TRAINING PROGRAM

## 2022-01-13 PROCEDURE — 4004F PT TOBACCO SCREEN RCVD TLK: CPT | Performed by: STUDENT IN AN ORGANIZED HEALTH CARE EDUCATION/TRAINING PROGRAM

## 2022-01-13 PROCEDURE — 99213 OFFICE O/P EST LOW 20 MIN: CPT | Performed by: STUDENT IN AN ORGANIZED HEALTH CARE EDUCATION/TRAINING PROGRAM

## 2022-01-13 PROCEDURE — G8484 FLU IMMUNIZE NO ADMIN: HCPCS | Performed by: STUDENT IN AN ORGANIZED HEALTH CARE EDUCATION/TRAINING PROGRAM

## 2022-01-13 PROCEDURE — 2022F DILAT RTA XM EVC RTNOPTHY: CPT | Performed by: STUDENT IN AN ORGANIZED HEALTH CARE EDUCATION/TRAINING PROGRAM

## 2022-01-13 PROCEDURE — 3046F HEMOGLOBIN A1C LEVEL >9.0%: CPT | Performed by: STUDENT IN AN ORGANIZED HEALTH CARE EDUCATION/TRAINING PROGRAM

## 2022-01-13 PROCEDURE — G8427 DOCREV CUR MEDS BY ELIG CLIN: HCPCS | Performed by: STUDENT IN AN ORGANIZED HEALTH CARE EDUCATION/TRAINING PROGRAM

## 2022-01-13 SDOH — ECONOMIC STABILITY: FOOD INSECURITY: WITHIN THE PAST 12 MONTHS, YOU WORRIED THAT YOUR FOOD WOULD RUN OUT BEFORE YOU GOT MONEY TO BUY MORE.: NEVER TRUE

## 2022-01-13 SDOH — ECONOMIC STABILITY: FOOD INSECURITY: WITHIN THE PAST 12 MONTHS, THE FOOD YOU BOUGHT JUST DIDN'T LAST AND YOU DIDN'T HAVE MONEY TO GET MORE.: NEVER TRUE

## 2022-01-13 ASSESSMENT — ENCOUNTER SYMPTOMS
ABDOMINAL DISTENTION: 0
ABDOMINAL PAIN: 0
VOMITING: 0
CONSTIPATION: 0
WHEEZING: 0
NAUSEA: 0
COUGH: 0
DIARRHEA: 0
SHORTNESS OF BREATH: 0

## 2022-01-13 ASSESSMENT — SOCIAL DETERMINANTS OF HEALTH (SDOH): HOW HARD IS IT FOR YOU TO PAY FOR THE VERY BASICS LIKE FOOD, HOUSING, MEDICAL CARE, AND HEATING?: NOT VERY HARD

## 2022-01-13 ASSESSMENT — PATIENT HEALTH QUESTIONNAIRE - PHQ9
SUM OF ALL RESPONSES TO PHQ QUESTIONS 1-9: 0
SUM OF ALL RESPONSES TO PHQ QUESTIONS 1-9: 0
SUM OF ALL RESPONSES TO PHQ9 QUESTIONS 1 & 2: 0
SUM OF ALL RESPONSES TO PHQ QUESTIONS 1-9: 0
1. LITTLE INTEREST OR PLEASURE IN DOING THINGS: 0
2. FEELING DOWN, DEPRESSED OR HOPELESS: 0
SUM OF ALL RESPONSES TO PHQ QUESTIONS 1-9: 0

## 2022-01-13 NOTE — PROGRESS NOTES
[address] [facility] - Outpatient Note        Fernanda Edmond is a 61 y.o. male  presented to the office on 01/13/22with complaints of: DM2, hypertension follow-up    HPI:  Patient has history of DM2, hypertension, PAD, CAD came today for follow-up of diabetes and hypertension. DM2:  Not well controlled. He is taking Lantus 24 units daily and was supposed to take Humalog but he is not taking that because he wanted to confirm with us to take both of the insulins. He also takes metformin 1000 mg twice daily. He states his fasting sugar in the morning is around 150 and his bedtime sugar is around 180. His most recent A1c was 14. He follows up with ophthalmology. Denies vision changes, numbness tingling in extremities. Hypertension:  Patient has peripheral arterial disease and has blood pressure discrepancy between 2 arms. His blood pressure today is 135/76. He is taking his medications. He denies any associated symptoms. He has an appointment with cardiology and vascular for his heart murmur and peripheral arterial disease. Review of Systems   Constitutional: Negative for chills and fever. HENT: Negative. Respiratory: Negative for cough, shortness of breath and wheezing. Cardiovascular: Negative for chest pain, palpitations and leg swelling. Gastrointestinal: Negative for abdominal distention, abdominal pain, constipation, diarrhea, nausea and vomiting. Genitourinary: Negative. Musculoskeletal: Negative. Skin: Negative. Neurological: Negative. Psychiatric/Behavioral: Negative. Vitals:    01/13/22 0845   BP: 135/76   Pulse: 106   Temp: 97.3 °F (36.3 °C)       Physical Exam  Vitals and nursing note reviewed. Constitutional:       General: He is not in acute distress. Appearance: Normal appearance. HENT:      Head: Normocephalic and atraumatic. Cardiovascular:      Rate and Rhythm: Normal rate and regular rhythm. Pulses: Normal pulses. Heart sounds: Normal heart sounds. No murmur heard. Pulmonary:      Effort: Pulmonary effort is normal. No respiratory distress. Breath sounds: Normal breath sounds. Abdominal:      General: Abdomen is flat. Bowel sounds are normal.      Palpations: Abdomen is soft. Musculoskeletal:         General: No swelling or tenderness. Normal range of motion. Skin:     General: Skin is warm and dry. Capillary Refill: Capillary refill takes less than 2 seconds. Coloration: Skin is not jaundiced or pale. Findings: No erythema. Neurological:      General: No focal deficit present. Mental Status: He is alert and oriented to person, place, and time. Psychiatric:         Mood and Affect: Mood normal.          Diagnosis Orders   1. Type 2 diabetes mellitus with diabetic neuropathy, without long-term current use of insulin ([facility])  metFORMIN (GLUCOPHAGE) 1000 MG tablet    Basic Metabolic Panel    [facility] DIABETES FOOT EXAM   2. PAD (peripheral artery disease) ([facility])  Lipid Panel   3. Essential hypertension  Lipid Panel    Basic Metabolic Panel       Assessment and plan:  1.)  T2DM: Continue Lantus 24 units nightly. Advised to start Humalog with meals. Patient has sliding scale chart. Continue metformin 1000 mg twice daily. Normal diabetic foot exam today. Patient has right-sided above-knee amputation. 2.)  Hypertension: Well-controlled. Continue same medications. Advised to follow-up with his vascular and cardiology appointments. We will check lipid panel and BMP today. Follow-up in 4 weeks    Patient was counseled on preventive measures and screenings offered today. Rationale of preventive and screening measures and consequences of delayed screening explained. Patient wants to proceed with Cologuard.       Brenna Braxton MD  Family Medicine PGY-2  01/13/22 at 12:07 PM

## 2022-01-27 ENCOUNTER — TELEPHONE (OUTPATIENT)
Dept: FAMILY MEDICINE CLINIC | Age: 57
End: 2022-01-27

## 2022-02-10 DIAGNOSIS — I73.9 PAD (PERIPHERAL ARTERY DISEASE) (HCC): ICD-10-CM

## 2022-02-10 NOTE — TELEPHONE ENCOUNTER
E-scribe request for med refills. Please review and e-scribe if applicable. Last Visit Date:  01/13/2022  Next Visit Date:  2/17/2022    Hemoglobin A1C (%)   Date Value   12/23/2021 14.0   11/09/2021 12.9   12/10/2020 12.0             ( goal A1C is < 7)   Microalb/Crt.  Ratio (mcg/mg creat)   Date Value   12/15/2020 8     LDL Cholesterol (mg/dL)   Date Value   01/13/2022            (goal LDL is <100)   AST (U/L)   Date Value   05/04/2019 26     ALT (U/L)   Date Value   05/04/2019 43 (H)     BUN (mg/dL)   Date Value   01/13/2022 57 (H)     BP Readings from Last 3 Encounters:   01/13/22 135/76   12/23/21 (!) 170/105   11/16/21 (!) 199/80          (goal 120/80)        Patient Active Problem List:     PAD (peripheral artery disease) ([address]     Subacute osteomyelitis of right tibia ([facility])     Metabolic encephalopathy     Abnormal CT of the head     Hepatitis     Acute cerebral infarction associated with systemic hypoxia or ischemia (HCC)     Multi infarct state     Carotid stenosis, bilateral     Right foot drop     Thrombocytosis     Acute CVA (cerebrovascular accident) ([address]     Moderate malnutrition ([address]     Cellulitis of right lower extremity     Right-sided extracranial carotid artery stenosis     Essential hypertension     Type 2 diabetes mellitus with diabetic neuropathy, without long-term current use of insulin (HCC)     Leukocytosis     Status post angiography of extremity     Ischemic gangrene ([address]     Pyogenic inflammation of bone ([address]     Coronary artery disease     Above knee amputation of right lower extremity (HCC)     Pressure injury of right heel, unstageable ([address]     Hx of AKA (above knee amputation), right (HCC)     Erectile dysfunction associated with type 2 diabetes mellitus ([address]      ----Nolan Sick

## 2022-02-17 RX ORDER — CLOPIDOGREL BISULFATE 75 MG/1
75 TABLET ORAL DAILY
Qty: 30 TABLET | Refills: 0 | Status: SHIPPED | OUTPATIENT
Start: 2022-02-17

## 2022-02-17 RX ORDER — GABAPENTIN 300 MG/1
CAPSULE ORAL
Qty: 90 CAPSULE | Refills: 1 | Status: SHIPPED | OUTPATIENT
Start: 2022-02-17 | End: 2022-03-19

## 2022-08-23 ENCOUNTER — APPOINTMENT (OUTPATIENT)
Dept: CT IMAGING | Age: 57
End: 2022-08-23
Payer: MEDICAID

## 2022-08-23 ENCOUNTER — HOSPITAL ENCOUNTER (EMERGENCY)
Age: 57
Discharge: HOME | End: 2022-08-24
Attending: STUDENT IN AN ORGANIZED HEALTH CARE EDUCATION/TRAINING PROGRAM
Payer: MEDICAID

## 2022-08-23 DIAGNOSIS — R73.9 HYPERGLYCEMIA: ICD-10-CM
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
## 2022-08-23 LAB
ALBUMIN SERPL-MCNC: 4.2 G/DL (ref 3.5–5.2)
ALP BLD-CCNC: 89 U/L (ref 40–129)
ALT SERPL-CCNC: 15 U/L (ref 5–41)
ANION GAP SERPL CALCULATED.3IONS-SCNC: 14 MMOL/L (ref 9–17)
AST SERPL-CCNC: 17 U/L
BILIRUB SERPL-MCNC: 0.67 MG/DL (ref 0.3–1.2)
BUN BLDV-MCNC: 17 MG/DL (ref 6–20)
CALCIUM SERPL-MCNC: 9.6 MG/DL (ref 8.6–10.4)
CHLORIDE BLD-SCNC: 96 MMOL/L (ref 98–107)
CO2: 23 MMOL/L (ref 20–31)
CREAT SERPL-MCNC: 0.88 MG/DL (ref 0.7–1.2)
ETHANOL PERCENT: <0.01 %
ETHANOL: <10 MG/DL
GFR AFRICAN AMERICAN: >60 ML/MIN
GFR NON-AFRICAN AMERICAN: >60 ML/MIN
GFR SERPL CREATININE-BSD FRML MDRD: ABNORMAL ML/MIN/{1.73_M2}
GLUCOSE BLD-MCNC: 471 MG/DL (ref 70–99)
INR BLD: 1.1
POTASSIUM SERPL-SCNC: 3.8 MMOL/L (ref 3.7–5.3)
PROTHROMBIN TIME: 13.7 SEC (ref 11.8–14.6)
SODIUM BLD-SCNC: 133 MMOL/L (ref 135–144)
TOTAL PROTEIN: 7.6 G/DL (ref 6.4–8.3)
TROPONIN, HIGH SENSITIVITY: 15 NG/L (ref 0–22)

## 2022-08-23 PROCEDURE — 72128 CT CHEST SPINE W/O DYE: CPT

## 2022-08-23 PROCEDURE — 70486 CT MAXILLOFACIAL W/O DYE: CPT

## 2022-08-23 PROCEDURE — 85025 COMPLETE CBC W/AUTO DIFF WBC: CPT

## 2022-08-23 PROCEDURE — 99285 EMERGENCY DEPT VISIT HI MDM: CPT

## 2022-08-23 PROCEDURE — 86900 BLOOD TYPING SEROLOGIC ABO: CPT

## 2022-08-23 PROCEDURE — 96361 HYDRATE IV INFUSION ADD-ON: CPT

## 2022-08-23 PROCEDURE — 72125 CT NECK SPINE W/O DYE: CPT

## 2022-08-23 PROCEDURE — 96374 THER/PROPH/DIAG INJ IV PUSH: CPT

## 2022-08-23 PROCEDURE — 6360000004 HC RX CONTRAST MEDICATION: Performed by: STUDENT IN AN ORGANIZED HEALTH CARE EDUCATION/TRAINING PROGRAM

## 2022-08-23 PROCEDURE — 93005 ELECTROCARDIOGRAM TRACING: CPT

## 2022-08-23 PROCEDURE — 72131 CT LUMBAR SPINE W/O DYE: CPT

## 2022-08-23 PROCEDURE — 84484 ASSAY OF TROPONIN QUANT: CPT

## 2022-08-23 PROCEDURE — 86901 BLOOD TYPING SEROLOGIC RH(D): CPT

## 2022-08-23 PROCEDURE — G0480 DRUG TEST DEF 1-7 CLASSES: HCPCS

## 2022-08-23 PROCEDURE — 2580000003 HC RX 258: Performed by: STUDENT IN AN ORGANIZED HEALTH CARE EDUCATION/TRAINING PROGRAM

## 2022-08-23 PROCEDURE — 71260 CT THORAX DX C+: CPT

## 2022-08-23 PROCEDURE — 70450 CT HEAD/BRAIN W/O DYE: CPT

## 2022-08-23 PROCEDURE — 80053 COMPREHEN METABOLIC PANEL: CPT

## 2022-08-23 PROCEDURE — 86850 RBC ANTIBODY SCREEN: CPT

## 2022-08-23 PROCEDURE — 36415 COLL VENOUS BLD VENIPUNCTURE: CPT

## 2022-08-23 PROCEDURE — 85610 PROTHROMBIN TIME: CPT

## 2022-08-23 RX ORDER — 0.9 % SODIUM CHLORIDE 0.9 %
1000 INTRAVENOUS SOLUTION INTRAVENOUS ONCE
Status: COMPLETED | OUTPATIENT
Start: 2022-08-23 | End: 2022-08-24

## 2022-08-23 RX ORDER — 0.9 % SODIUM CHLORIDE 0.9 %
100 INTRAVENOUS SOLUTION INTRAVENOUS ONCE
Status: COMPLETED | OUTPATIENT
Start: 2022-08-23 | End: 2022-08-24

## 2022-08-23 RX ORDER — FENTANYL CITRATE 50 UG/ML
50 INJECTION, SOLUTION INTRAMUSCULAR; INTRAVENOUS ONCE
Status: COMPLETED | OUTPATIENT
Start: 2022-08-23 | End: 2022-08-24

## 2022-08-23 RX ORDER — SODIUM CHLORIDE 0.9 % (FLUSH) 0.9 %
10 SYRINGE (ML) INJECTION PRN
Status: DISCONTINUED | OUTPATIENT
Start: 2022-08-23 | End: 2022-08-24 | Stop reason: HOSPADM

## 2022-08-23 RX ADMIN — SODIUM CHLORIDE, PRESERVATIVE FREE 10 ML: 5 INJECTION INTRAVENOUS at 23:37

## 2022-08-23 RX ADMIN — IOPAMIDOL 100 ML: 755 INJECTION, SOLUTION INTRAVENOUS at 23:37

## 2022-08-23 RX ADMIN — SODIUM CHLORIDE 100 ML: 9 INJECTION, SOLUTION INTRAVENOUS at 23:39

## 2022-08-23 RX ADMIN — SODIUM CHLORIDE 1000 ML: 9 INJECTION, SOLUTION INTRAVENOUS at 23:26

## 2022-08-23 ASSESSMENT — LIFESTYLE VARIABLES
HOW MANY STANDARD DRINKS CONTAINING ALCOHOL DO YOU HAVE ON A TYPICAL DAY: PATIENT DOES NOT DRINK
HOW OFTEN DO YOU HAVE A DRINK CONTAINING ALCOHOL: NEVER

## 2022-08-23 ASSESSMENT — PAIN DESCRIPTION - ORIENTATION: ORIENTATION: LEFT

## 2022-08-23 ASSESSMENT — ENCOUNTER SYMPTOMS
SHORTNESS OF BREATH: 0
NAUSEA: 0
EYE DISCHARGE: 0
VOMITING: 0
DIARRHEA: 0
EYE REDNESS: 0
RHINORRHEA: 0
SORE THROAT: 0
CHEST TIGHTNESS: 0
ABDOMINAL PAIN: 0

## 2022-08-23 ASSESSMENT — PAIN DESCRIPTION - LOCATION: LOCATION: CHEST

## 2022-08-23 ASSESSMENT — PAIN - FUNCTIONAL ASSESSMENT: PAIN_FUNCTIONAL_ASSESSMENT: 0-10

## 2022-08-23 ASSESSMENT — PAIN SCALES - GENERAL: PAINLEVEL_OUTOF10: 2

## 2022-08-24 VITALS
RESPIRATION RATE: 20 BRPM | WEIGHT: 180 LBS | OXYGEN SATURATION: 93 % | SYSTOLIC BLOOD PRESSURE: 130 MMHG | HEART RATE: 92 BPM | BODY MASS INDEX: 28.93 KG/M2 | HEIGHT: 66 IN | TEMPERATURE: 99.7 F | DIASTOLIC BLOOD PRESSURE: 80 MMHG

## 2022-08-24 LAB
ABO/RH: NORMAL
ABSOLUTE EOS #: 0.35 K/UL (ref 0–0.4)
ABSOLUTE LYMPH #: 2.48 K/UL (ref 1–4.8)
ABSOLUTE MONO #: 1.77 K/UL (ref 0.1–1.3)
AMPHETAMINE SCREEN URINE: NEGATIVE
ANTIBODY SCREEN: NEGATIVE
ARM BAND NUMBER: NORMAL
BARBITURATE SCREEN URINE: NEGATIVE
BASOPHILS # BLD: 0 % (ref 0–2)
BASOPHILS ABSOLUTE: 0 K/UL (ref 0–0.2)
BENZODIAZEPINE SCREEN, URINE: NEGATIVE
BILIRUBIN URINE: NEGATIVE
CANNABINOID SCREEN URINE: NEGATIVE
COCAINE METABOLITE, URINE: NEGATIVE
COLOR: YELLOW
COMMENT UA: ABNORMAL
EOSINOPHILS RELATIVE PERCENT: 2 % (ref 0–4)
EXPIRATION DATE: NORMAL
FENTANYL URINE: NEGATIVE
GLUCOSE URINE: ABNORMAL
HCT VFR BLD CALC: 53.1 % (ref 41–53)
HEMOGLOBIN: 17.7 G/DL (ref 13.5–17.5)
KETONES, URINE: ABNORMAL
LEUKOCYTE ESTERASE, URINE: NEGATIVE
LYMPHOCYTES # BLD: 14 % (ref 24–44)
MCH RBC QN AUTO: 29.6 PG (ref 26–34)
MCHC RBC AUTO-ENTMCNC: 33.3 G/DL (ref 31–37)
MCV RBC AUTO: 88.8 FL (ref 80–100)
METHADONE SCREEN, URINE: NEGATIVE
MONOCYTES # BLD: 10 % (ref 1–7)
MORPHOLOGY: NORMAL
NITRITE, URINE: NEGATIVE
OPIATES, URINE: NEGATIVE
OXYCODONE SCREEN URINE: NEGATIVE
PDW BLD-RTO: 14.1 % (ref 11.5–14.9)
PH UA: 6.5 (ref 5–8)
PHENCYCLIDINE, URINE: NEGATIVE
PLATELET # BLD: 308 K/UL (ref 150–450)
PMV BLD AUTO: 8.7 FL (ref 6–12)
PROTEIN UA: NEGATIVE
RBC # BLD: 5.98 M/UL (ref 4.5–5.9)
SEG NEUTROPHILS: 74 % (ref 36–66)
SEGMENTED NEUTROPHILS ABSOLUTE COUNT: 13.1 K/UL (ref 1.3–9.1)
SPECIFIC GRAVITY UA: 1.02 (ref 1–1.03)
TEST INFORMATION: NORMAL
TURBIDITY: CLEAR
URINE HGB: NEGATIVE
UROBILINOGEN, URINE: NORMAL
WBC # BLD: 17.7 K/UL (ref 3.5–11)

## 2022-08-24 PROCEDURE — 81003 URINALYSIS AUTO W/O SCOPE: CPT

## 2022-08-24 PROCEDURE — 2580000003 HC RX 258: Performed by: STUDENT IN AN ORGANIZED HEALTH CARE EDUCATION/TRAINING PROGRAM

## 2022-08-24 PROCEDURE — 6360000002 HC RX W HCPCS: Performed by: STUDENT IN AN ORGANIZED HEALTH CARE EDUCATION/TRAINING PROGRAM

## 2022-08-24 PROCEDURE — 6370000000 HC RX 637 (ALT 250 FOR IP): Performed by: STUDENT IN AN ORGANIZED HEALTH CARE EDUCATION/TRAINING PROGRAM

## 2022-08-24 PROCEDURE — 80307 DRUG TEST PRSMV CHEM ANLYZR: CPT

## 2022-08-24 RX ORDER — METOPROLOL TARTRATE 50 MG/1
50 TABLET, FILM COATED ORAL ONCE
Status: COMPLETED | OUTPATIENT
Start: 2022-08-24 | End: 2022-08-24

## 2022-08-24 RX ORDER — SODIUM CHLORIDE, SODIUM LACTATE, POTASSIUM CHLORIDE, AND CALCIUM CHLORIDE .6; .31; .03; .02 G/100ML; G/100ML; G/100ML; G/100ML
1000 INJECTION, SOLUTION INTRAVENOUS ONCE
Status: COMPLETED | OUTPATIENT
Start: 2022-08-24 | End: 2022-08-24

## 2022-08-24 RX ADMIN — SODIUM CHLORIDE, POTASSIUM CHLORIDE, SODIUM LACTATE AND CALCIUM CHLORIDE 1000 ML: 600; 310; 30; 20 INJECTION, SOLUTION INTRAVENOUS at 01:06

## 2022-08-24 RX ADMIN — FENTANYL CITRATE 50 MCG: 50 INJECTION, SOLUTION INTRAMUSCULAR; INTRAVENOUS at 00:06

## 2022-08-24 RX ADMIN — METOPROLOL TARTRATE 50 MG: 50 TABLET, FILM COATED ORAL at 01:09

## 2022-08-24 ASSESSMENT — PAIN DESCRIPTION - LOCATION: LOCATION: CHEST

## 2022-08-24 ASSESSMENT — PAIN SCALES - GENERAL: PAINLEVEL_OUTOF10: 3

## 2022-08-24 ASSESSMENT — PAIN DESCRIPTION - ORIENTATION: ORIENTATION: LEFT
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
## 2022-08-26 NOTE — PROGRESS NOTES
[facility] -   Infectious diseases evaluation  admission date 4/7/2019    reason for consultation:   Expopsed hardware    Impression :   Current:  · Fever  · Hepatitis  - possible amiodarone  · Thickened GB wall  · Exposed tibial hardware  · Card arrest x 3 on the field - delayed chest closure  · P edema   · ARF    Other:  ·   Discussion / summary of stay / plan of care   · Patient admitted with chest pain and cardiac arrests x 3  · Emergent CABG x 4  · Found to have protruding Rt tibial orthopedic screw from prior ORIF of tibial fracture  · No overt infection  · Temp 103 on 4-11-19. Repeat blood cultures: No growth . Sputum with mixed oral tabitha. Vancomycin started. · ALMITA with Creatinine 0.4-->1. 19. Vancomycin d/c. Ceftaroline started  · 4/19 GB wall thick on US      Recommendations   · ceftaroline still   · GB US shows thickening, consider HIDA  · Elevated transaminases from amiodarone  -stp amiodarone   · Follow LFT  · Get 2 blood cx   · Fever might be from the liver as well  · Disc w RN    Infection Control Recommendations   · [place] Precautions    Antimicrobial Stewardship Recommendations   · Simplification of therapy  · Targeted therapy      Coordination ofOutpatient Care:   · Estimated Length of IV antimicrobials:  · Patient will need Midline / picc Catheter Insertion:   · Patient will need SNF:  · Patient will need outpatient wound care:     History of Present Illness:       Interval changes  4/13/2019   38.5 all day today  FIO2 50 and small sputum   Icterus  abd soft and VAC on the leg- dressed bilat LE wounds  Renal US describes the GB w sludge and with thick wall  On amiodarone and the AST 3.77 yesterday    WBC 16    Results for Saimr Flores (MRN [id number]) as of 4/13/2019 20:56   Ref.  Range 4/7/2019 19:47 4/12/2019 11:24   Alk Phos Latest Ref Range: 40 - 129 U/L 37 (L) 71   ALT Latest Ref Range: 5 - 41 U/L 63 (H) 3,818 (H)   Amylase Latest Ref Range: 28 - 100 U/L Left vm for patient to call office.   78   AST Latest Ref Range: <40 U/L 148 (H) 3,077 (H)   Bilirubin Latest Ref Range: 0.3 - 1.2 mg/dL 0.71 1.90 (H)   Bilirubin, Direct Latest Ref Range: <0.31 mg/dL 0.18 1.30 (H)   Bilirubin, Indirect Latest Ref Range: 0.00 - 1.00 mg/dL 0.53 0.60   Lipase Latest Ref Range: 13 - 60 U/L  40   Total Protein Latest Ref Range: 6.4 - 8.3 g/dL 5.1 (L) 4.8 (L)       cx from the leg mixed bacteriae      I have personally reviewed the past medical history, past surgical history, medications, social history, and family history, and I haveupdated the database accordingly.   Past Medical History:     Past Medical History:   Diagnosis Date    CAD (coronary artery disease) 04/07/2019    Cardiac arrest ([facility] [address] 04/07/2019    Osteolysis 04/07/2019    R Knee hardware    PEA (Pulseless electrical activity) ([facility] [address] 04/07/2019    PVD (peripheral vascular disease) ([facility] [address] 04/07/2019       Past Surgical  History:     Past Surgical History:   Procedure Laterality Date    CARDIAC CATHETERIZATION  04/07/2019    MVD-Stv    CORONARY ARTERY BYPASS GRAFT  04/07/2019    x4 per Dr. Lawson Felty, [address] [place] [address] GRAFT N/A 4/7/2019    EMERGENT CPR,  CORONARY ARTERY BYPASS X4, ON PUMP ,CHEST LEFT OPEN; SWAN, ANGELA PER ANESTHESIA performed by Tamiko Wheatley MD at [address] [facility]      unsure if correct known tibial surgery    HARDWARE REMOVAL Right 04/07/2019    R Knee internal screw protruding through skin    RECONSTRUCTIVE REPAIR STERNAL N/A 4/8/2019    STERNAL  WOUND WASHOUT AND CLOSURE S/P CABG performed by Tamiko Wheatley MD at [address] [facility] Right     [place] Fried off roof, external fixation and internal hardware    TONSILLECTOMY         Medications:      nystatin  5 mL Oral 4x Daily    heparin (porcine)  5,000 Units Subcutaneous 3 times per day    famotidine  20 mg Oral Daily    ceftaroline fosamil (TEFLARO) IVPB  400 mg Intravenous Q12H    aspirin  81 mg Oral Daily    docusate 100 mg Oral Daily    [Held by provider] furosemide  40 mg Intravenous BID    dextrose  12.5 g Intravenous Once    albuterol  2.5 mg Nebulization Q6H    sodium chloride flush  10 mL Intravenous 2 times per day    sodium chloride flush  10 mL Intravenous 2 times per day    polyethylene glycol  17 g Oral Daily    metoprolol tartrate  25 mg Oral BID    amiodarone  200 mg Oral BID    chlorhexidine  15 mL Mouth/Throat BID    therapeutic multivitamin-minerals  1 tablet Oral Daily with breakfast    atorvastatin  40 mg Oral Nightly    clopidogrel  75 mg Oral Daily    insulin lispro  0-12 Units Subcutaneous TID WC    insulin lispro  0-6 Units Subcutaneous Nightly       Social History:     Social History     Socioeconomic History    Marital status: Unknown     Spouse name: Not on file    Number of children: Not on file    Years of education: Not on file    Highest education level: Not on file   Occupational History    Not on file   Social Needs    Financial resource strain: Not on file    Food insecurity:     Worry: Not on file     Inability: Not on file    Transportation needs:     Medical: Not on file     Non-medical: Not on file   Tobacco Use    Smoking status: Not on file   Substance and Sexual Activity    Alcohol use: Not on file    Drug use: Not on file    Sexual activity: Not on file   Lifestyle    Physical activity:     Days per week: Not on file     Minutes per session: Not on file    Stress: Not on file   Relationships    Social connections:     Talks on phone: Not on file     Gets together: Not on file     Attends Latter day service: Not on file     Active member of club or organization: Not on file     Attends meetings of clubs or organizations: Not on file     Relationship status: Not on file    Intimate partner violence:     Fear of current or ex partner: Not on file     Emotionally abused: Not on file     Physically abused: Not on file     Forced sexual activity: Not on file   Other Topics Concern    Not on file   Social History Narrative    Not on file       Family History:   No family history on file. Allergies:   Patient has no allergy information on record. Review of Systems:     Review of Systems   Unable to perform ROS: Other (on the vent)       Physical Examination :     Patient Vitals for the past 8 hrs:   BP Pulse Resp SpO2   04/13/19 2016 -- 92 16 98 %   04/13/19 1900 (!) 157/65 88 16 99 %   04/13/19 1800 (!) 143/58 88 16 99 %   04/13/19 1700 (!) 122/53 86 15 98 %   04/13/19 1647 -- 92 -- --   04/13/19 1600 (!) 154/66 89 20 99 %   04/13/19 1557 -- 88 18 99 %   04/13/19 1500 (!) 144/60 86 17 99 %   04/13/19 1400 (!) 126/55 89 20 100 %   04/13/19 1348 -- -- -- 100 %   04/13/19 1330 -- 88 -- 100 %   04/13/19 1300 (!) 126/52 86 18 100 %       Physical Exam   Constitutional: He appears well-developed and well-nourished. HENT:   Head: Normocephalic and atraumatic. Right Ear: External ear normal.   Left Ear: External ear normal.   Eyes: Conjunctivae are normal. Scleral icterus is present. Neck: Neck supple. No tracheal deviation present. Intubated  On the vent   Cardiovascular: Normal rate and regular rhythm. Exam reveals no friction rub. No murmur heard. Pulmonary/Chest: Effort normal and breath sounds normal. No stridor. No respiratory distress. Abdominal: Soft. He exhibits no distension. There is no tenderness. Genitourinary:   Genitourinary Comments: Urine clear - gamino in place   Musculoskeletal: He exhibits no edema or deformity. Neurological:   On the vent sedated    Skin: Skin is warm and dry. No erythema.    Wounds lower extremities covered   Psychiatric:   calm         Medical Decision Making:   I have independently reviewed/ordered the following labs:    CBC with Differential:   Recent Labs     04/12/19  0534 04/12/19  0729 04/13/19  0546   WBC 12.4*  --  16.5*   HGB 6.6* 8.7* 9.3*   HCT 20.7* 27.4* 29.1*   *  --  169     BMP:  Recent Labs

## 2022-09-08 ENCOUNTER — TELEPHONE (OUTPATIENT)
Dept: FAMILY MEDICINE CLINIC | Age: 57
End: 2022-09-08

## 2022-09-08 NOTE — TELEPHONE ENCOUNTER
Spoke with patient to schedule a follow up appointment, patient has not been seen since January, 2022; patient states that he needs to get his insurance straightened out and he will call office back to set up a follow up appointment.

## 2022-11-14 ENCOUNTER — TELEPHONE (OUTPATIENT)
Dept: FAMILY MEDICINE CLINIC | Age: 57
End: 2022-11-14

## 2023-01-07 ENCOUNTER — APPOINTMENT (OUTPATIENT)
Dept: CT IMAGING | Age: 58
DRG: 405 | End: 2023-01-07
Payer: MEDICAID

## 2023-01-07 ENCOUNTER — HOSPITAL ENCOUNTER (INPATIENT)
Age: 58
LOS: 15 days | Discharge: HOME HEALTH | DRG: 405 | End: 2023-01-22
Attending: EMERGENCY MEDICINE | Admitting: FAMILY MEDICINE
Payer: MEDICAID

## 2023-01-07 ENCOUNTER — APPOINTMENT (OUTPATIENT)
Dept: GENERAL RADIOLOGY | Age: 58
DRG: 405 | End: 2023-01-07
Payer: MEDICAID

## 2023-01-07 DIAGNOSIS — E11.69 ERECTILE DYSFUNCTION ASSOCIATED WITH TYPE 2 DIABETES MELLITUS (HCC): ICD-10-CM

## 2023-01-07 DIAGNOSIS — Z95.828 PRESENCE OF INTERNAL CAROTID STENT: ICD-10-CM

## 2023-01-07 DIAGNOSIS — R41.82 ALTERED MENTAL STATUS, UNSPECIFIED ALTERED MENTAL STATUS TYPE: Primary | ICD-10-CM

## 2023-01-07 DIAGNOSIS — I66.01 STENOSIS OF RIGHT MIDDLE CEREBRAL ARTERY: ICD-10-CM

## 2023-01-07 DIAGNOSIS — N52.1 ERECTILE DYSFUNCTION ASSOCIATED WITH TYPE 2 DIABETES MELLITUS (HCC): ICD-10-CM

## 2023-01-07 DIAGNOSIS — R73.9 HYPERGLYCEMIA: ICD-10-CM

## 2023-01-07 PROBLEM — E11.10 DKA, TYPE 2, NOT AT GOAL (HCC): Status: ACTIVE | Noted: 2023-01-07

## 2023-01-07 LAB
ABSOLUTE EOS #: <0.03 K/UL (ref 0–0.44)
ABSOLUTE IMMATURE GRANULOCYTE: 0.12 K/UL (ref 0–0.3)
ABSOLUTE LYMPH #: 0.87 K/UL (ref 1.1–3.7)
ABSOLUTE MONO #: 1.05 K/UL (ref 0.1–1.2)
ALBUMIN SERPL-MCNC: 3.3 G/DL (ref 3.5–5.2)
ALBUMIN/GLOBULIN RATIO: 0.8 (ref 1–2.5)
ALP BLD-CCNC: 125 U/L (ref 40–129)
ALT SERPL-CCNC: 18 U/L (ref 5–41)
AMMONIA: 20 UMOL/L (ref 16–60)
ANION GAP SERPL CALCULATED.3IONS-SCNC: 26 MMOL/L (ref 9–17)
AST SERPL-CCNC: 16 U/L
BASOPHILS # BLD: 0 % (ref 0–2)
BASOPHILS ABSOLUTE: 0.04 K/UL (ref 0–0.2)
BETA-HYDROXYBUTYRATE: 2.43 MMOL/L (ref 0.02–0.27)
BILIRUB SERPL-MCNC: 0.4 MG/DL (ref 0.3–1.2)
BILIRUBIN URINE: NEGATIVE
BUN BLDV-MCNC: 27 MG/DL (ref 6–20)
CALCIUM SERPL-MCNC: 9.3 MG/DL (ref 8.6–10.4)
CARBOXYHEMOGLOBIN: 2.1 % (ref 0–5)
CASTS UA: ABNORMAL /LPF (ref 0–8)
CHLORIDE BLD-SCNC: 86 MMOL/L (ref 98–107)
CO2: 22 MMOL/L (ref 20–31)
COLOR: YELLOW
CREAT SERPL-MCNC: 1.22 MG/DL (ref 0.7–1.2)
EOSINOPHILS RELATIVE PERCENT: 0 % (ref 1–4)
FIO2: ABNORMAL
GFR SERPL CREATININE-BSD FRML MDRD: >60 ML/MIN/1.73M2
GLUCOSE BLD-MCNC: 450 MG/DL (ref 75–110)
GLUCOSE BLD-MCNC: 620 MG/DL (ref 70–99)
GLUCOSE URINE: ABNORMAL
HCO3 VENOUS: 28.8 MMOL/L (ref 24–30)
HCT VFR BLD CALC: 51.4 % (ref 40.7–50.3)
HEMOGLOBIN: 17.8 G/DL (ref 13–17)
IMMATURE GRANULOCYTES: 1 %
KETONES, URINE: ABNORMAL
LACTIC ACID, SEPSIS WHOLE BLOOD: 2.3 MMOL/L (ref 0.5–1.9)
LACTIC ACID, SEPSIS WHOLE BLOOD: 5.9 MMOL/L (ref 0.5–1.9)
LEUKOCYTE ESTERASE, URINE: NEGATIVE
LYMPHOCYTES # BLD: 4 % (ref 24–43)
MAGNESIUM: 2.3 MG/DL (ref 1.6–2.6)
MCH RBC QN AUTO: 29.6 PG (ref 25.2–33.5)
MCHC RBC AUTO-ENTMCNC: 34.6 G/DL (ref 28.4–34.8)
MCV RBC AUTO: 85.4 FL (ref 82.6–102.9)
MONOCYTES # BLD: 5 % (ref 3–12)
MYOGLOBIN: 116 NG/ML (ref 28–72)
NITRITE, URINE: NEGATIVE
NRBC AUTOMATED: 0 PER 100 WBC
O2 SAT, VEN: 86.9 % (ref 60–85)
PARTIAL THROMBOPLASTIN TIME: 23.8 SEC (ref 20.5–30.5)
PATIENT TEMP: 37
PCO2, VEN: 52.3 MM HG (ref 39–55)
PDW BLD-RTO: 12.5 % (ref 11.8–14.4)
PH UA: 5.5 (ref 5–8)
PH VENOUS: 7.36 (ref 7.32–7.42)
PLATELET # BLD: 320 K/UL (ref 138–453)
PMV BLD AUTO: 11.6 FL (ref 8.1–13.5)
PO2, VEN: 52.6 MM HG (ref 30–50)
POSITIVE BASE EXCESS, VEN: 2.5 MMOL/L (ref 0–2)
POTASSIUM SERPL-SCNC: 3.8 MMOL/L (ref 3.7–5.3)
PRO-BNP: 3946 PG/ML
PROCALCITONIN: 0.16 NG/ML
PROTEIN UA: ABNORMAL
RBC # BLD: 6.02 M/UL (ref 4.21–5.77)
RBC UA: ABNORMAL /HPF (ref 0–4)
SEG NEUTROPHILS: 90 % (ref 36–65)
SEGMENTED NEUTROPHILS ABSOLUTE COUNT: 17.61 K/UL (ref 1.5–8.1)
SODIUM BLD-SCNC: 134 MMOL/L (ref 135–144)
SPECIFIC GRAVITY UA: 1.04 (ref 1–1.03)
THYROXINE, FREE: 1.32 NG/DL (ref 0.93–1.7)
TOTAL CK: 72 U/L (ref 39–308)
TOTAL PROTEIN: 7.2 G/DL (ref 6.4–8.3)
TROPONIN, HIGH SENSITIVITY: 25 NG/L (ref 0–22)
TROPONIN, HIGH SENSITIVITY: 30 NG/L (ref 0–22)
TSH SERPL DL<=0.05 MIU/L-ACNC: 0.6 UIU/ML (ref 0.3–5)
TURBIDITY: CLEAR
URINE HGB: ABNORMAL
UROBILINOGEN, URINE: NORMAL
WBC # BLD: 19.7 K/UL (ref 3.5–11.3)
WBC UA: ABNORMAL /HPF (ref 0–5)

## 2023-01-07 PROCEDURE — 83735 ASSAY OF MAGNESIUM: CPT

## 2023-01-07 PROCEDURE — 70496 CT ANGIOGRAPHY HEAD: CPT

## 2023-01-07 PROCEDURE — 87086 URINE CULTURE/COLONY COUNT: CPT

## 2023-01-07 PROCEDURE — 84443 ASSAY THYROID STIM HORMONE: CPT

## 2023-01-07 PROCEDURE — 36415 COLL VENOUS BLD VENIPUNCTURE: CPT

## 2023-01-07 PROCEDURE — 82947 ASSAY GLUCOSE BLOOD QUANT: CPT

## 2023-01-07 PROCEDURE — 72125 CT NECK SPINE W/O DYE: CPT

## 2023-01-07 PROCEDURE — 82010 KETONE BODYS QUAN: CPT

## 2023-01-07 PROCEDURE — 84484 ASSAY OF TROPONIN QUANT: CPT

## 2023-01-07 PROCEDURE — 80048 BASIC METABOLIC PNL TOTAL CA: CPT

## 2023-01-07 PROCEDURE — 83874 ASSAY OF MYOGLOBIN: CPT

## 2023-01-07 PROCEDURE — 2580000003 HC RX 258: Performed by: STUDENT IN AN ORGANIZED HEALTH CARE EDUCATION/TRAINING PROGRAM

## 2023-01-07 PROCEDURE — 99285 EMERGENCY DEPT VISIT HI MDM: CPT

## 2023-01-07 PROCEDURE — 82805 BLOOD GASES W/O2 SATURATION: CPT

## 2023-01-07 PROCEDURE — 87040 BLOOD CULTURE FOR BACTERIA: CPT

## 2023-01-07 PROCEDURE — 84145 PROCALCITONIN (PCT): CPT

## 2023-01-07 PROCEDURE — 71045 X-RAY EXAM CHEST 1 VIEW: CPT

## 2023-01-07 PROCEDURE — 6360000002 HC RX W HCPCS: Performed by: STUDENT IN AN ORGANIZED HEALTH CARE EDUCATION/TRAINING PROGRAM

## 2023-01-07 PROCEDURE — 82140 ASSAY OF AMMONIA: CPT

## 2023-01-07 PROCEDURE — 2500000003 HC RX 250 WO HCPCS: Performed by: STUDENT IN AN ORGANIZED HEALTH CARE EDUCATION/TRAINING PROGRAM

## 2023-01-07 PROCEDURE — 96375 TX/PRO/DX INJ NEW DRUG ADDON: CPT

## 2023-01-07 PROCEDURE — 81001 URINALYSIS AUTO W/SCOPE: CPT

## 2023-01-07 PROCEDURE — 2580000003 HC RX 258

## 2023-01-07 PROCEDURE — 93005 ELECTROCARDIOGRAM TRACING: CPT | Performed by: STUDENT IN AN ORGANIZED HEALTH CARE EDUCATION/TRAINING PROGRAM

## 2023-01-07 PROCEDURE — 82550 ASSAY OF CK (CPK): CPT

## 2023-01-07 PROCEDURE — 5A09357 ASSISTANCE WITH RESPIRATORY VENTILATION, LESS THAN 24 CONSECUTIVE HOURS, CONTINUOUS POSITIVE AIRWAY PRESSURE: ICD-10-PCS | Performed by: STUDENT IN AN ORGANIZED HEALTH CARE EDUCATION/TRAINING PROGRAM

## 2023-01-07 PROCEDURE — 83605 ASSAY OF LACTIC ACID: CPT

## 2023-01-07 PROCEDURE — 6360000004 HC RX CONTRAST MEDICATION: Performed by: STUDENT IN AN ORGANIZED HEALTH CARE EDUCATION/TRAINING PROGRAM

## 2023-01-07 PROCEDURE — 96365 THER/PROPH/DIAG IV INF INIT: CPT

## 2023-01-07 PROCEDURE — 85025 COMPLETE CBC W/AUTO DIFF WBC: CPT

## 2023-01-07 PROCEDURE — 80307 DRUG TEST PRSMV CHEM ANLYZR: CPT

## 2023-01-07 PROCEDURE — 84481 FREE ASSAY (FT-3): CPT

## 2023-01-07 PROCEDURE — 85730 THROMBOPLASTIN TIME PARTIAL: CPT

## 2023-01-07 PROCEDURE — 84439 ASSAY OF FREE THYROXINE: CPT

## 2023-01-07 PROCEDURE — 83880 ASSAY OF NATRIURETIC PEPTIDE: CPT

## 2023-01-07 PROCEDURE — 70450 CT HEAD/BRAIN W/O DYE: CPT

## 2023-01-07 PROCEDURE — 80053 COMPREHEN METABOLIC PANEL: CPT

## 2023-01-07 PROCEDURE — 2000000000 HC ICU R&B

## 2023-01-07 RX ORDER — ONDANSETRON 4 MG/1
4 TABLET, ORALLY DISINTEGRATING ORAL EVERY 8 HOURS PRN
Status: DISCONTINUED | OUTPATIENT
Start: 2023-01-07 | End: 2023-01-22 | Stop reason: HOSPADM

## 2023-01-07 RX ORDER — HEPARIN SODIUM 1000 [USP'U]/ML
4000 INJECTION, SOLUTION INTRAVENOUS; SUBCUTANEOUS ONCE
Status: COMPLETED | OUTPATIENT
Start: 2023-01-07 | End: 2023-01-07

## 2023-01-07 RX ORDER — M-VIT,TX,IRON,MINS/CALC/FOLIC 27MG-0.4MG
1 TABLET ORAL
Status: DISCONTINUED | OUTPATIENT
Start: 2023-01-08 | End: 2023-01-22 | Stop reason: HOSPADM

## 2023-01-07 RX ORDER — ACETAMINOPHEN 325 MG/1
650 TABLET ORAL EVERY 6 HOURS PRN
Status: DISCONTINUED | OUTPATIENT
Start: 2023-01-07 | End: 2023-01-22 | Stop reason: HOSPADM

## 2023-01-07 RX ORDER — HEPARIN SODIUM 1000 [USP'U]/ML
2000 INJECTION, SOLUTION INTRAVENOUS; SUBCUTANEOUS PRN
Status: DISCONTINUED | OUTPATIENT
Start: 2023-01-07 | End: 2023-01-22 | Stop reason: HOSPADM

## 2023-01-07 RX ORDER — MAGNESIUM SULFATE 1 G/100ML
1000 INJECTION INTRAVENOUS PRN
Status: DISCONTINUED | OUTPATIENT
Start: 2023-01-07 | End: 2023-01-22 | Stop reason: HOSPADM

## 2023-01-07 RX ORDER — METOPROLOL TARTRATE 5 MG/5ML
2.5 INJECTION INTRAVENOUS EVERY 6 HOURS
Status: DISCONTINUED | OUTPATIENT
Start: 2023-01-08 | End: 2023-01-09

## 2023-01-07 RX ORDER — NICARDIPINE HYDROCHLORIDE 0.1 MG/ML
2.5-15 INJECTION INTRAVENOUS CONTINUOUS
Status: DISCONTINUED | OUTPATIENT
Start: 2023-01-07 | End: 2023-01-08

## 2023-01-07 RX ORDER — METOPROLOL TARTRATE 50 MG/1
50 TABLET, FILM COATED ORAL 2 TIMES DAILY
Status: DISCONTINUED | OUTPATIENT
Start: 2023-01-07 | End: 2023-01-07

## 2023-01-07 RX ORDER — DEXTROSE, SODIUM CHLORIDE, AND POTASSIUM CHLORIDE 5; .45; .15 G/100ML; G/100ML; G/100ML
INJECTION INTRAVENOUS CONTINUOUS PRN
Status: DISCONTINUED | OUTPATIENT
Start: 2023-01-07 | End: 2023-01-09

## 2023-01-07 RX ORDER — ATORVASTATIN CALCIUM 40 MG/1
40 TABLET, FILM COATED ORAL NIGHTLY
Status: DISCONTINUED | OUTPATIENT
Start: 2023-01-07 | End: 2023-01-13

## 2023-01-07 RX ORDER — HEPARIN SODIUM 1000 [USP'U]/ML
4000 INJECTION, SOLUTION INTRAVENOUS; SUBCUTANEOUS PRN
Status: DISCONTINUED | OUTPATIENT
Start: 2023-01-07 | End: 2023-01-22 | Stop reason: HOSPADM

## 2023-01-07 RX ORDER — ASPIRIN 81 MG/1
81 TABLET, CHEWABLE ORAL DAILY
Status: DISCONTINUED | OUTPATIENT
Start: 2023-01-08 | End: 2023-01-22 | Stop reason: HOSPADM

## 2023-01-07 RX ORDER — ACETAMINOPHEN 650 MG/1
650 SUPPOSITORY RECTAL EVERY 6 HOURS PRN
Status: DISCONTINUED | OUTPATIENT
Start: 2023-01-07 | End: 2023-01-22 | Stop reason: HOSPADM

## 2023-01-07 RX ORDER — POTASSIUM CHLORIDE 7.45 MG/ML
10 INJECTION INTRAVENOUS PRN
Status: DISCONTINUED | OUTPATIENT
Start: 2023-01-07 | End: 2023-01-22 | Stop reason: HOSPADM

## 2023-01-07 RX ORDER — LABETALOL HYDROCHLORIDE 5 MG/ML
10 INJECTION, SOLUTION INTRAVENOUS ONCE
Status: COMPLETED | OUTPATIENT
Start: 2023-01-07 | End: 2023-01-07

## 2023-01-07 RX ORDER — POLYETHYLENE GLYCOL 3350 17 G/17G
17 POWDER, FOR SOLUTION ORAL DAILY PRN
Status: DISCONTINUED | OUTPATIENT
Start: 2023-01-07 | End: 2023-01-22 | Stop reason: HOSPADM

## 2023-01-07 RX ORDER — SODIUM CHLORIDE 0.9 % (FLUSH) 0.9 %
5-40 SYRINGE (ML) INJECTION EVERY 12 HOURS SCHEDULED
Status: DISCONTINUED | OUTPATIENT
Start: 2023-01-07 | End: 2023-01-22 | Stop reason: HOSPADM

## 2023-01-07 RX ORDER — 0.9 % SODIUM CHLORIDE 0.9 %
30 INTRAVENOUS SOLUTION INTRAVENOUS ONCE
Status: COMPLETED | OUTPATIENT
Start: 2023-01-07 | End: 2023-01-07

## 2023-01-07 RX ORDER — MAGNESIUM SULFATE IN WATER 40 MG/ML
2000 INJECTION, SOLUTION INTRAVENOUS ONCE
Status: COMPLETED | OUTPATIENT
Start: 2023-01-07 | End: 2023-01-07

## 2023-01-07 RX ORDER — NICARDIPINE HYDROCHLORIDE 0.1 MG/ML
2.5-15 INJECTION INTRAVENOUS CONTINUOUS
Status: DISCONTINUED | OUTPATIENT
Start: 2023-01-07 | End: 2023-01-07

## 2023-01-07 RX ORDER — SODIUM CHLORIDE 450 MG/100ML
INJECTION, SOLUTION INTRAVENOUS CONTINUOUS
Status: DISCONTINUED | OUTPATIENT
Start: 2023-01-08 | End: 2023-01-09

## 2023-01-07 RX ORDER — SODIUM CHLORIDE 9 MG/ML
INJECTION, SOLUTION INTRAVENOUS PRN
Status: DISCONTINUED | OUTPATIENT
Start: 2023-01-07 | End: 2023-01-22 | Stop reason: HOSPADM

## 2023-01-07 RX ORDER — ONDANSETRON 2 MG/ML
4 INJECTION INTRAMUSCULAR; INTRAVENOUS EVERY 6 HOURS PRN
Status: DISCONTINUED | OUTPATIENT
Start: 2023-01-07 | End: 2023-01-22 | Stop reason: HOSPADM

## 2023-01-07 RX ORDER — HEPARIN SODIUM AND DEXTROSE 10000; 5 [USP'U]/100ML; G/100ML
5-30 INJECTION INTRAVENOUS CONTINUOUS
Status: DISCONTINUED | OUTPATIENT
Start: 2023-01-07 | End: 2023-01-09

## 2023-01-07 RX ORDER — SODIUM CHLORIDE 0.9 % (FLUSH) 0.9 %
5-40 SYRINGE (ML) INJECTION PRN
Status: DISCONTINUED | OUTPATIENT
Start: 2023-01-07 | End: 2023-01-22 | Stop reason: HOSPADM

## 2023-01-07 RX ORDER — 0.9 % SODIUM CHLORIDE 0.9 %
15 INTRAVENOUS SOLUTION INTRAVENOUS ONCE
Status: DISCONTINUED | OUTPATIENT
Start: 2023-01-07 | End: 2023-01-09

## 2023-01-07 RX ADMIN — HEPARIN SODIUM 12 UNITS/KG/HR: 10000 INJECTION, SOLUTION INTRAVENOUS at 20:49

## 2023-01-07 RX ADMIN — NICARDIPINE HYDROCHLORIDE 5 MG/HR: 0.1 INJECTION INTRAVENOUS at 18:19

## 2023-01-07 RX ADMIN — SODIUM CHLORIDE 2000 ML: 9 INJECTION, SOLUTION INTRAVENOUS at 17:19

## 2023-01-07 RX ADMIN — PIPERACILLIN AND TAZOBACTAM 4500 MG: 4; .5 INJECTION, POWDER, FOR SOLUTION INTRAVENOUS at 17:27

## 2023-01-07 RX ADMIN — SODIUM CHLORIDE: 4.5 INJECTION, SOLUTION INTRAVENOUS at 23:58

## 2023-01-07 RX ADMIN — LABETALOL HYDROCHLORIDE 10 MG: 5 INJECTION, SOLUTION INTRAVENOUS at 16:36

## 2023-01-07 RX ADMIN — NICARDIPINE HYDROCHLORIDE 2.5 MG/HR: 0.1 INJECTION INTRAVENOUS at 21:32

## 2023-01-07 RX ADMIN — Medication 1250 MG: at 18:25

## 2023-01-07 RX ADMIN — IOPAMIDOL 90 ML: 755 INJECTION, SOLUTION INTRAVENOUS at 18:45

## 2023-01-07 RX ADMIN — MAGNESIUM SULFATE HEPTAHYDRATE 2000 MG: 40 INJECTION, SOLUTION INTRAVENOUS at 21:29

## 2023-01-07 RX ADMIN — THIAMINE HYDROCHLORIDE: 100 INJECTION, SOLUTION INTRAMUSCULAR; INTRAVENOUS at 18:36

## 2023-01-07 RX ADMIN — HEPARIN SODIUM 4000 UNITS: 1000 INJECTION INTRAVENOUS; SUBCUTANEOUS at 20:48

## 2023-01-07 NOTE — ED NOTES
Dr. Melly Vázquez and Dr. Jeramie Harkins at bedside.      [facility], RN  01/07/23 [id number]

## 2023-01-07 NOTE — ED NOTES
Family at bedside. Family requesting to speak to a Doctor. Dr. Kauffman El informed.       Ford Lewis, RN  01/07/23 [id number]

## 2023-01-07 NOTE — ED NOTES
Critical glucose of 620, urine with 3+ glucose and ketones noted from lab. Resident notified.       Brad Fernandez RN  01/07/23 [id number]

## 2023-01-07 NOTE — ED NOTES
Brothers phone number [phone] [facility])     Ammon Campbell RN  01/07/23 0507

## 2023-01-07 NOTE — ED PROVIDER NOTES
[facility]  Emergency Department  Faculty Attestation     I performed a history and physical examination of the patient and discussed management with the resident. I reviewed the residents note and agree with the documented findings and plan of care. Any areas of disagreement are noted on the chart. I was personally present for the key portions of any procedures. I have documented in the chart those procedures where I was not present during the key portions. I have reviewed the emergency nurses triage note. I agree with the chief complaint, past medical history, past surgical history, allergies, medications, social and family history as documented unless otherwise noted below. For Physician Assistant/ Nurse Practitioner cases/documentation I have personally evaluated this patient and have completed at least one if not all key elements of the E/M (history, physical exam, and MDM). Additional findings are as noted.       Primary Care Physician:  Farhana Brown MD    Screenings:  [unfilled]    CHIEF COMPLAINT       Chief Complaint   Patient presents with    Altered Mental Status       RECENT VITALS:   Temp: 99.9 °F (37.7 °C),  Heart Rate: (!) 110, Resp: 18, BP: (!) 183/69 (right arm)    LABS:  Labs Reviewed   CBC WITH AUTO DIFFERENTIAL - Abnormal; Notable for the following components:       Result Value    WBC 19.7 (*)     RBC 6.02 (*)     Hemoglobin 17.8 (*)     Hematocrit 51.4 (*)     Seg Neutrophils 90 (*)     Lymphocytes 4 (*)     Eosinophils % 0 (*)     Immature Granulocytes 1 (*)     Segs Absolute 17.61 (*)     Absolute Lymph # 0.87 (*)     All other components within normal limits   COMPREHENSIVE METABOLIC PANEL - Abnormal; Notable for the following components:    Glucose 620 (*)     BUN 27 (*)     Creatinine 1.22 (*)     Sodium 134 (*)     Chloride 86 (*)     Anion Gap 26 (*)     Albumin 3.3 (*)     Albumin/Globulin Ratio 0.8 (*)     All other components within normal limits   LACTATE, SEPSIS - Abnormal; Notable for the following components:    Lactic Acid, Sepsis, Whole Blood 5.9 (*)     All other components within normal limits   LACTATE, SEPSIS - Abnormal; Notable for the following components:    Lactic Acid, Sepsis, Whole Blood 2.3 (*)     All other components within normal limits   TROPONIN - Abnormal; Notable for the following components:    Troponin, High Sensitivity 30 (*)     All other components within normal limits   TROPONIN - Abnormal; Notable for the following components:    Troponin, High Sensitivity 25 (*)     All other components within normal limits   URINALYSIS WITH MICROSCOPIC - Abnormal; Notable for the following components:    Glucose, Ur 3+ (*)     Ketones, Urine SMALL (*)     Specific Gravity, UA 1.036 (*)     Urine Hgb MODERATE (*)     Protein, UA 3+ (*)     All other components within normal limits   CULTURE, BLOOD 1   CULTURE, BLOOD 1   CULTURE, URINE   AMMONIA   MAGNESIUM   T4, FREE   TSH   TSH   T3, FREE   T4, FREE   T3, FREE   BETA-HYDROXYBUTYRATE   CK   MYOGLOBIN, BLOOD   APTT   BLOOD GAS, VENOUS   APTT   APTT       Radiology  XR CHEST PORTABLE   Final Result   No acute abnormality. CTA HEAD NECK W CONTRAST   Final Result   1. 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 2.  Patent right cervical internal carotid artery stent demonstrates   mild-to-moderate in stent stenosis. Follow-up catheter angiogram may be   helpful for further evaluation. 3.  Multifocal areas of moderate to severe stenosis in the right middle   cerebral artery branches. 4.  Multifocal severe stenoses in the right anterior cerebral artery A2 and   A3 segments. 5.  Focal severe stenosis in the left subclavian artery secondary to   noncalcified atherosclerotic plaque at the level of the left vertebral artery   origin. CT HEAD WO CONTRAST   Preliminary Result   No acute intracranial abnormality.   Motion artifact limits evaluation of the   lower cervical spine but no acute abnormality seen. CT CERVICAL SPINE WO CONTRAST   Preliminary Result   No acute intracranial abnormality. Motion artifact limits evaluation of the   lower cervical spine but no acute abnormality seen. CRITICAL CARE: There was a high probability of clinically significant/life threatening deterioration in this patient's condition which required my urgent intervention. Total critical care time was 10 minutes. This excludes any time for separately reportable procedures. EKG:  EKG Interpretation    Interpreted by me    Rhythm: normal sinus   Rate: normal  Axis: normal  Ectopy: none  Conduction: Long QTc  ST Segments: Elevation of 2 mm in V2-V3, subtle scooping depression inferiorly  T Waves: no acute change  Q Waves: Septal, poor R wave progression    Clinical Impression: Tachycardia, ST elevations consistent with LVH, peaked T waves and long QTC    Attending Physician Additional  Notes    Brings patient for altered mentation. He was last seen well Thursday evening, now almost 2 days later. They found to be hypertensive tachycardic with repetitive movement of his chin/mouth. He has right AKA. He is quite disheveled and dirty with poor nutrition and hygiene. He has new abrasions on the right side of his head as well as in the right hand. Past medical history has multiple vascular issues including peripheral vascular disease, strokes, osteomyelitis, right AKA, bilateral carotid stenosis, type 2 diabetes, poor nutrition no prior alcohol use, smoking history however. On exam patient is tachycardic hypertensive and without respiratory distress, normal saturations, no increased work of breathing, feels cool, culture pending. End-tidal CO2 in the 40s. Eyes are open. Gaze is mostly conjugate with a mild right esotropia, he has lack of confrontation to left eye, he prefers to gaze towards the right.   He has dry mouth and repetitive jerking movements of his jaw but he stops this and requested to. There is brown liquid that has appearance of sputum or perhaps tobacco on his face and chest.  He does extend his arms to verbal stimuli. He moans when any 2 word answers to questions. Neck is nontender. Lungs are clear. Abdomen soft and nontender. Impression is altered mentation, consider hypertensive encephalopathy, possible stroke versus intracranial hemorrhage, consider trauma with abrasions, pneumonia with dark secretions, dehydration, sodium abnormality, less likely DKA but consider hyperosmolar state. Plan is IV access, IV fluids, thiamine, point-of-care chemistries, stroke consultation, CT brain, CT angiogram, CT cervical spine, chest x-ray, lactate, blood cultures, consider Ativan, blood pressure control, will start with labetalol  EKG is more likely to be LVH, however there appears to be new changes, perhaps rate related, will send EKG G to cardiology and trend troponins. Kayleen Mitchell. Dorothy Abbasi MD, [address] [facility]  Attending Emergency  Physician                Lisa Herron MD  01/07/23 6929    Review of medical record, F chart, consultant notes: Review of medical record shows history of peripheral and cerebrovascular disease, prior stroke, multiple infarcts, osteomyelitis with AKA, type 2 diabetes, malnutrition, hypertension. Interview of family member, witness, nurse, teacher or EMS: N/A    Ordering and review of tests: Chest x-ray is without evidence of infiltrate, white blood cell count markedly elevated as is lactate, suggesting possible infectious process, chemistries show hyperglycemia without metabolic acidosis, ALMITA, elevation in BUN/creatinine, elevated anion gap, mildly elevated troponin and downtrending, mildly elevated lactate and downtrending, CT angiogram shows multiple old occlusions. CT brain is without evidence of acute stroke or bleed.     Use of clinical prediction rules: N/A    Differential diagnosis: Dehydration, hyperglycemia, rule out DKA which now appears unlikely, consider sepsis though infectious source not known, blood pressure asymmetry between right and left side concern for dissection however this is excluded by a CT angiogram, does show partial occlusion this origin of the left subclavian, ALMITA, hyperosmolar state    Medication reconciliation and monitoring (especially parenteral controlled substances): N/A    Consent (risks, benefit, alternatives) for procedures: N/A    Responses to interventions (especially BP, ECG, O2Sat monitoring) and drug therapy: Improvement in mental status with IV hydration    Consultations with PCP and specialists: Discussed care with neurology/stroke team and they agree with testing and interventions, will discontinue Cardene infusion since blood pressures are lower on the left side, and he is reached his target systolic blood pressure.     Social work consultation to address social imitations (housing insecurity, insurance, unemployment): N/A    Shared decision-making (risks, benefit, alternatives) with patient and family members including admission, discharge and resuscitation status: Patient agreeable to admission and interventions         Simona Curtis MD  01/07/23 7963

## 2023-01-07 NOTE — ED TRIAGE NOTES
Pt was brought by EMS with c/o altered mental status. EMS states that daughter found him on the floor. EMS states that pt's last known well is Thursday (Jan 5). Pt's blood glucose 219 mg/dl as per EMS. Pt's BP is 259/104 upon arrival to ED. Pt's o2 sat 93-95 % on 2 lpm.   Pt has IV Fluid of 0.9 Nacl infusing. Pt appeared dirty and unkempt.

## 2023-01-08 ENCOUNTER — APPOINTMENT (OUTPATIENT)
Dept: GENERAL RADIOLOGY | Age: 58
DRG: 405 | End: 2023-01-08
Payer: MEDICAID

## 2023-01-08 ENCOUNTER — APPOINTMENT (OUTPATIENT)
Dept: MRI IMAGING | Age: 58
DRG: 405 | End: 2023-01-08
Payer: MEDICAID

## 2023-01-08 LAB
ABSOLUTE EOS #: 0 K/UL (ref 0–0.4)
ABSOLUTE IMMATURE GRANULOCYTE: 0 K/UL (ref 0–0.3)
ABSOLUTE LYMPH #: 2.71 K/UL (ref 1–4.8)
ABSOLUTE MONO #: 1.81 K/UL (ref 0.1–0.8)
AMPHETAMINE SCREEN URINE: NEGATIVE
ANION GAP SERPL CALCULATED.3IONS-SCNC: 14 MMOL/L (ref 9–17)
ANION GAP SERPL CALCULATED.3IONS-SCNC: 6 MMOL/L (ref 9–17)
ANION GAP SERPL CALCULATED.3IONS-SCNC: 6 MMOL/L (ref 9–17)
ANION GAP SERPL CALCULATED.3IONS-SCNC: 7 MMOL/L (ref 9–17)
ANION GAP SERPL CALCULATED.3IONS-SCNC: 8 MMOL/L (ref 9–17)
ANION GAP SERPL CALCULATED.3IONS-SCNC: 9 MMOL/L (ref 9–17)
BARBITURATE SCREEN URINE: NEGATIVE
BASOPHILS # BLD: 0 % (ref 0–2)
BASOPHILS ABSOLUTE: 0 K/UL (ref 0–0.2)
BENZODIAZEPINE SCREEN, URINE: NEGATIVE
BUN BLDV-MCNC: 16 MG/DL (ref 6–20)
BUN BLDV-MCNC: 17 MG/DL (ref 6–20)
BUN BLDV-MCNC: 20 MG/DL (ref 6–20)
BUN BLDV-MCNC: 22 MG/DL (ref 6–20)
CALCIUM SERPL-MCNC: 7.7 MG/DL (ref 8.6–10.4)
CALCIUM SERPL-MCNC: 7.9 MG/DL (ref 8.6–10.4)
CALCIUM SERPL-MCNC: 7.9 MG/DL (ref 8.6–10.4)
CALCIUM SERPL-MCNC: 8.1 MG/DL (ref 8.6–10.4)
CALCIUM SERPL-MCNC: 8.5 MG/DL (ref 8.6–10.4)
CALCIUM SERPL-MCNC: 8.5 MG/DL (ref 8.6–10.4)
CANNABINOID SCREEN URINE: NEGATIVE
CHLORIDE BLD-SCNC: 100 MMOL/L (ref 98–107)
CHLORIDE BLD-SCNC: 102 MMOL/L (ref 98–107)
CHLORIDE BLD-SCNC: 102 MMOL/L (ref 98–107)
CHLORIDE BLD-SCNC: 98 MMOL/L (ref 98–107)
CHLORIDE BLD-SCNC: 99 MMOL/L (ref 98–107)
CHLORIDE BLD-SCNC: 99 MMOL/L (ref 98–107)
CO2: 27 MMOL/L (ref 20–31)
CO2: 28 MMOL/L (ref 20–31)
CO2: 29 MMOL/L (ref 20–31)
CO2: 31 MMOL/L (ref 20–31)
COCAINE METABOLITE, URINE: NEGATIVE
CREAT SERPL-MCNC: 0.6 MG/DL (ref 0.7–1.2)
CREAT SERPL-MCNC: 0.63 MG/DL (ref 0.7–1.2)
CREAT SERPL-MCNC: 0.7 MG/DL (ref 0.7–1.2)
CREAT SERPL-MCNC: 0.72 MG/DL (ref 0.7–1.2)
CREAT SERPL-MCNC: 0.78 MG/DL (ref 0.7–1.2)
CREAT SERPL-MCNC: 0.81 MG/DL (ref 0.7–1.2)
CULTURE: NO GROWTH
EOSINOPHILS RELATIVE PERCENT: 0 % (ref 1–4)
FENTANYL URINE: NEGATIVE
GFR SERPL CREATININE-BSD FRML MDRD: >60 ML/MIN/1.73M2
GLUCOSE BLD-MCNC: 140 MG/DL (ref 75–110)
GLUCOSE BLD-MCNC: 145 MG/DL (ref 70–99)
GLUCOSE BLD-MCNC: 148 MG/DL (ref 75–110)
GLUCOSE BLD-MCNC: 150 MG/DL (ref 75–110)
GLUCOSE BLD-MCNC: 153 MG/DL (ref 75–110)
GLUCOSE BLD-MCNC: 162 MG/DL (ref 75–110)
GLUCOSE BLD-MCNC: 185 MG/DL (ref 75–110)
GLUCOSE BLD-MCNC: 196 MG/DL (ref 75–110)
GLUCOSE BLD-MCNC: 207 MG/DL (ref 70–99)
GLUCOSE BLD-MCNC: 252 MG/DL (ref 75–110)
GLUCOSE BLD-MCNC: 262 MG/DL (ref 70–99)
GLUCOSE BLD-MCNC: 278 MG/DL (ref 75–110)
GLUCOSE BLD-MCNC: 333 MG/DL (ref 75–110)
GLUCOSE BLD-MCNC: 398 MG/DL (ref 70–99)
GLUCOSE BLD-MCNC: 80 MG/DL (ref 70–99)
GLUCOSE BLD-MCNC: 99 MG/DL (ref 70–99)
HCT VFR BLD CALC: 44.5 % (ref 40.7–50.3)
HEMOGLOBIN: 15.3 G/DL (ref 13–17)
IMMATURE GRANULOCYTES: 0 %
LYMPHOCYTES # BLD: 12 % (ref 24–44)
MAGNESIUM: 2.2 MG/DL (ref 1.6–2.6)
MAGNESIUM: 2.3 MG/DL (ref 1.6–2.6)
MAGNESIUM: 2.6 MG/DL (ref 1.6–2.6)
MCH RBC QN AUTO: 29.9 PG (ref 25.2–33.5)
MCHC RBC AUTO-ENTMCNC: 34.4 G/DL (ref 28.4–34.8)
MCV RBC AUTO: 87.1 FL (ref 82.6–102.9)
METHADONE SCREEN, URINE: NEGATIVE
MONOCYTES # BLD: 8 % (ref 1–7)
MORPHOLOGY: NORMAL
NRBC AUTOMATED: 0 PER 100 WBC
OPIATES, URINE: NEGATIVE
OXYCODONE SCREEN URINE: NEGATIVE
PARTIAL THROMBOPLASTIN TIME: 29.8 SEC (ref 20.5–30.5)
PARTIAL THROMBOPLASTIN TIME: 35.5 SEC (ref 20.5–30.5)
PARTIAL THROMBOPLASTIN TIME: 78.1 SEC (ref 20.5–30.5)
PDW BLD-RTO: 12.6 % (ref 11.8–14.4)
PHENCYCLIDINE, URINE: NEGATIVE
PHOSPHORUS: 1.8 MG/DL (ref 2.5–4.5)
PHOSPHORUS: 2 MG/DL (ref 2.5–4.5)
PHOSPHORUS: 2 MG/DL (ref 2.5–4.5)
PHOSPHORUS: 2.4 MG/DL (ref 2.5–4.5)
PHOSPHORUS: 3.3 MG/DL (ref 2.5–4.5)
PLATELET # BLD: 314 K/UL (ref 138–453)
PMV BLD AUTO: 11.3 FL (ref 8.1–13.5)
POTASSIUM SERPL-SCNC: 3.1 MMOL/L (ref 3.7–5.3)
POTASSIUM SERPL-SCNC: 3.3 MMOL/L (ref 3.7–5.3)
POTASSIUM SERPL-SCNC: 3.4 MMOL/L (ref 3.7–5.3)
POTASSIUM SERPL-SCNC: 3.5 MMOL/L (ref 3.7–5.3)
POTASSIUM SERPL-SCNC: 3.7 MMOL/L (ref 3.7–5.3)
POTASSIUM SERPL-SCNC: 3.7 MMOL/L (ref 3.7–5.3)
RBC # BLD: 5.11 M/UL (ref 4.21–5.77)
SEG NEUTROPHILS: 80 % (ref 36–66)
SEGMENTED NEUTROPHILS ABSOLUTE COUNT: 18.08 K/UL (ref 1.8–7.7)
SODIUM BLD-SCNC: 135 MMOL/L (ref 135–144)
SODIUM BLD-SCNC: 136 MMOL/L (ref 135–144)
SODIUM BLD-SCNC: 137 MMOL/L (ref 135–144)
SODIUM BLD-SCNC: 138 MMOL/L (ref 135–144)
SODIUM BLD-SCNC: 138 MMOL/L (ref 135–144)
SODIUM BLD-SCNC: 139 MMOL/L (ref 135–144)
SPECIMEN DESCRIPTION: NORMAL
T3 FREE: 1.75 PG/ML (ref 2.02–4.43)
T3 FREE: 1.87 PG/ML (ref 2.02–4.43)
TEST INFORMATION: NORMAL
THYROXINE, FREE: 1.15 NG/DL (ref 0.93–1.7)
TROPONIN, HIGH SENSITIVITY: 30 NG/L (ref 0–22)
TROPONIN, HIGH SENSITIVITY: 30 NG/L (ref 0–22)
TROPONIN, HIGH SENSITIVITY: 32 NG/L (ref 0–22)
TSH SERPL DL<=0.05 MIU/L-ACNC: 0.45 UIU/ML (ref 0.3–5)
WBC # BLD: 22.6 K/UL (ref 3.5–11.3)

## 2023-01-08 PROCEDURE — 80048 BASIC METABOLIC PNL TOTAL CA: CPT

## 2023-01-08 PROCEDURE — 2000000000 HC ICU R&B

## 2023-01-08 PROCEDURE — 99223 1ST HOSP IP/OBS HIGH 75: CPT | Performed by: PSYCHIATRY & NEUROLOGY

## 2023-01-08 PROCEDURE — 6360000002 HC RX W HCPCS: Performed by: STUDENT IN AN ORGANIZED HEALTH CARE EDUCATION/TRAINING PROGRAM

## 2023-01-08 PROCEDURE — 2500000003 HC RX 250 WO HCPCS: Performed by: STUDENT IN AN ORGANIZED HEALTH CARE EDUCATION/TRAINING PROGRAM

## 2023-01-08 PROCEDURE — 74018 RADEX ABDOMEN 1 VIEW: CPT

## 2023-01-08 PROCEDURE — 2500000003 HC RX 250 WO HCPCS

## 2023-01-08 PROCEDURE — 85730 THROMBOPLASTIN TIME PARTIAL: CPT

## 2023-01-08 PROCEDURE — 99291 CRITICAL CARE FIRST HOUR: CPT | Performed by: INTERNAL MEDICINE

## 2023-01-08 PROCEDURE — 70551 MRI BRAIN STEM W/O DYE: CPT

## 2023-01-08 PROCEDURE — 2580000003 HC RX 258

## 2023-01-08 PROCEDURE — 95711 VEEG 2-12 HR UNMONITORED: CPT

## 2023-01-08 PROCEDURE — 95816 EEG AWAKE AND DROWSY: CPT

## 2023-01-08 PROCEDURE — 84484 ASSAY OF TROPONIN QUANT: CPT

## 2023-01-08 PROCEDURE — 6370000000 HC RX 637 (ALT 250 FOR IP)

## 2023-01-08 PROCEDURE — 6360000002 HC RX W HCPCS

## 2023-01-08 PROCEDURE — 82947 ASSAY GLUCOSE BLOOD QUANT: CPT

## 2023-01-08 PROCEDURE — 85025 COMPLETE CBC W/AUTO DIFF WBC: CPT

## 2023-01-08 PROCEDURE — 2580000003 HC RX 258: Performed by: STUDENT IN AN ORGANIZED HEALTH CARE EDUCATION/TRAINING PROGRAM

## 2023-01-08 PROCEDURE — 84100 ASSAY OF PHOSPHORUS: CPT

## 2023-01-08 PROCEDURE — 95700 EEG CONT REC W/VID EEG TECH: CPT

## 2023-01-08 PROCEDURE — 51798 US URINE CAPACITY MEASURE: CPT

## 2023-01-08 PROCEDURE — 93005 ELECTROCARDIOGRAM TRACING: CPT

## 2023-01-08 PROCEDURE — 51701 INSERT BLADDER CATHETER: CPT

## 2023-01-08 PROCEDURE — 83735 ASSAY OF MAGNESIUM: CPT

## 2023-01-08 PROCEDURE — 6370000000 HC RX 637 (ALT 250 FOR IP): Performed by: STUDENT IN AN ORGANIZED HEALTH CARE EDUCATION/TRAINING PROGRAM

## 2023-01-08 PROCEDURE — 36415 COLL VENOUS BLD VENIPUNCTURE: CPT

## 2023-01-08 RX ORDER — POTASSIUM CHLORIDE 7.45 MG/ML
10 INJECTION INTRAVENOUS
Status: COMPLETED | OUTPATIENT
Start: 2023-01-08 | End: 2023-01-09

## 2023-01-08 RX ORDER — DEXTROSE MONOHYDRATE 100 MG/ML
INJECTION, SOLUTION INTRAVENOUS CONTINUOUS PRN
Status: DISCONTINUED | OUTPATIENT
Start: 2023-01-08 | End: 2023-01-22 | Stop reason: HOSPADM

## 2023-01-08 RX ORDER — INSULIN LISPRO 100 [IU]/ML
0-16 INJECTION, SOLUTION INTRAVENOUS; SUBCUTANEOUS EVERY 4 HOURS
Status: DISCONTINUED | OUTPATIENT
Start: 2023-01-08 | End: 2023-01-10

## 2023-01-08 RX ORDER — DEXMEDETOMIDINE HYDROCHLORIDE 4 UG/ML
.1-1.5 INJECTION, SOLUTION INTRAVENOUS CONTINUOUS
Status: DISCONTINUED | OUTPATIENT
Start: 2023-01-08 | End: 2023-01-09

## 2023-01-08 RX ORDER — DEXTROSE, SODIUM CHLORIDE, AND POTASSIUM CHLORIDE 5; .45; .15 G/100ML; G/100ML; G/100ML
INJECTION INTRAVENOUS CONTINUOUS
Status: DISCONTINUED | OUTPATIENT
Start: 2023-01-08 | End: 2023-01-09

## 2023-01-08 RX ORDER — INSULIN LISPRO 100 [IU]/ML
0-16 INJECTION, SOLUTION INTRAVENOUS; SUBCUTANEOUS EVERY 6 HOURS
Status: DISCONTINUED | OUTPATIENT
Start: 2023-01-08 | End: 2023-01-08

## 2023-01-08 RX ORDER — POTASSIUM CHLORIDE 7.45 MG/ML
10 INJECTION INTRAVENOUS
Status: DISPENSED | OUTPATIENT
Start: 2023-01-08 | End: 2023-01-08

## 2023-01-08 RX ADMIN — METOPROLOL TARTRATE 2.5 MG: 5 INJECTION, SOLUTION INTRAVENOUS at 06:04

## 2023-01-08 RX ADMIN — METOPROLOL TARTRATE 2.5 MG: 5 INJECTION, SOLUTION INTRAVENOUS at 00:48

## 2023-01-08 RX ADMIN — THIAMINE HYDROCHLORIDE: 100 INJECTION, SOLUTION INTRAMUSCULAR; INTRAVENOUS at 07:54

## 2023-01-08 RX ADMIN — POTASSIUM CHLORIDE 10 MEQ: 7.46 INJECTION, SOLUTION INTRAVENOUS at 21:56

## 2023-01-08 RX ADMIN — HEPARIN SODIUM 4000 UNITS: 1000 INJECTION INTRAVENOUS; SUBCUTANEOUS at 03:57

## 2023-01-08 RX ADMIN — INSULIN LISPRO 8 UNITS: 100 INJECTION, SOLUTION INTRAVENOUS; SUBCUTANEOUS at 12:00

## 2023-01-08 RX ADMIN — INSULIN HUMAN 8 UNITS: 100 INJECTION, SOLUTION PARENTERAL at 01:11

## 2023-01-08 RX ADMIN — DEXMEDETOMIDINE HYDROCHLORIDE 0.2 MCG/KG/HR: 4 INJECTION, SOLUTION INTRAVENOUS at 04:01

## 2023-01-08 RX ADMIN — SODIUM CHLORIDE, PRESERVATIVE FREE 10 ML: 5 INJECTION INTRAVENOUS at 07:52

## 2023-01-08 RX ADMIN — POTASSIUM CHLORIDE 10 MEQ: 10 INJECTION, SOLUTION INTRAVENOUS at 16:43

## 2023-01-08 RX ADMIN — SODIUM CHLORIDE, PRESERVATIVE FREE 20 MG: 5 INJECTION INTRAVENOUS at 12:41

## 2023-01-08 RX ADMIN — METOPROLOL TARTRATE 2.5 MG: 5 INJECTION, SOLUTION INTRAVENOUS at 18:48

## 2023-01-08 RX ADMIN — SODIUM PHOSPHATE, MONOBASIC, MONOHYDRATE AND SODIUM PHOSPHATE, DIBASIC, ANHYDROUS 15 MMOL: 276; 142 INJECTION, SOLUTION INTRAVENOUS at 08:59

## 2023-01-08 RX ADMIN — POTASSIUM CHLORIDE 10 MEQ: 7.46 INJECTION, SOLUTION INTRAVENOUS at 11:00

## 2023-01-08 RX ADMIN — HEPARIN SODIUM 16 UNITS/KG/HR: 10000 INJECTION, SOLUTION INTRAVENOUS at 18:33

## 2023-01-08 RX ADMIN — METOPROLOL TARTRATE 2.5 MG: 5 INJECTION, SOLUTION INTRAVENOUS at 12:03

## 2023-01-08 RX ADMIN — POTASSIUM CHLORIDE 10 MEQ: 10 INJECTION, SOLUTION INTRAVENOUS at 13:35

## 2023-01-08 RX ADMIN — SODIUM CHLORIDE, PRESERVATIVE FREE 20 MG: 5 INJECTION INTRAVENOUS at 20:39

## 2023-01-08 RX ADMIN — POTASSIUM CHLORIDE 10 MEQ: 7.46 INJECTION, SOLUTION INTRAVENOUS at 21:48

## 2023-01-08 RX ADMIN — POTASSIUM CHLORIDE, DEXTROSE MONOHYDRATE AND SODIUM CHLORIDE: 150; 5; 450 INJECTION, SOLUTION INTRAVENOUS at 10:11

## 2023-01-08 RX ADMIN — SODIUM CHLORIDE, PRESERVATIVE FREE 10 ML: 5 INJECTION INTRAVENOUS at 20:40

## 2023-01-08 RX ADMIN — HEPARIN SODIUM 2000 UNITS: 1000 INJECTION INTRAVENOUS; SUBCUTANEOUS at 18:35

## 2023-01-08 RX ADMIN — POTASSIUM CHLORIDE, DEXTROSE MONOHYDRATE AND SODIUM CHLORIDE: 150; 5; 450 INJECTION, SOLUTION INTRAVENOUS at 03:22

## 2023-01-08 RX ADMIN — POTASSIUM CHLORIDE 10 MEQ: 10 INJECTION, SOLUTION INTRAVENOUS at 12:13

## 2023-01-08 RX ADMIN — SODIUM CHLORIDE 0.1 UNITS/KG/HR: 9 INJECTION, SOLUTION INTRAVENOUS at 00:04

## 2023-01-08 RX ADMIN — CEFTRIAXONE SODIUM 1000 MG: 10 INJECTION, POWDER, FOR SOLUTION INTRAVENOUS at 08:51

## 2023-01-08 RX ADMIN — POTASSIUM CHLORIDE 10 MEQ: 7.46 INJECTION, SOLUTION INTRAVENOUS at 22:57

## 2023-01-08 RX ADMIN — NICARDIPINE HYDROCHLORIDE 2.5 MG/HR: 0.1 INJECTION INTRAVENOUS at 03:12

## 2023-01-08 NOTE — PROGRESS NOTES
Writer called and spoke with Mary Shea listed in contacts to complete MRI screening form since she was not comfortable completing it. Writer called Katey Campos phone goes straight to voicemail. Writer attempted, patients brother Aric Johnie Liao's phone also goes to voicemail. Voicemail left to Evie and call back number provided. Man Polo called as well and went to voicemail, unable to leave voicemail as the mailbox is full.

## 2023-01-08 NOTE — PROGRESS NOTES
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 Patient presented to the ED with altered mental status with last known well two days prior. Patient was found to be hypertensive with -250, tachycardic with HR 110s. Patient appeared disheveled. EKG showed ST elevation in V1-V3 with mild elevation in V4, cardiology was consulted, troponins were downtrending and they recommended heparin infusion. Patient glucose was 620 with beta hydroxy butyrate 2.43 and anion gap 26. Patient was started on insulin gtt. Initial Labs in ED:  Cr 1.22  Lactic 2.3  Glucose 620  BHB 2.43  Anion Gap 26  Bicarb 22    pH 7.35 / 52.3 / 28.8 / 52.6  LFT unremarkable  TSH normal  WBC 19.7  UA negative for UTI    CT head negative  CXR unremarkable    CTA HEAD AND NECK  1. 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 2.  Patent right cervical internal carotid artery stent demonstrates   mild-to-moderate in stent stenosis. Follow-up catheter angiogram may be   helpful for further evaluation. 3.  Multifocal areas of moderate to severe stenosis in the right middle   cerebral artery branches. 4.  Multifocal severe stenoses in the right anterior cerebral artery A2 and   A3 segments. 5.  Focal severe stenosis in the left subclavian artery secondary to   noncalcified atherosclerotic plaque at the level of the left vertebral artery   origin.      SUBJECTIVE:     AWAKE & FOLLOWING COMMANDS:  [] No [x] Yes - aphasic but following commands, residual RUE deficits    SECRETIONS Amount:  [] Small [] Moderate  [] Large  [x] None  Color:     [] White [] Colored  [] Bloody    SEDATION:  RAAS Score:  [] Propofol gtt  [] Versed gtt  [] Ativan gtt   [x] No Sedation    PARALYZED:  [x] No    [] Yes    VASOPRESSORS:  [x] No    [] Yes  [] Levophed [] Dopamine [] Vasopressin  [] Dobutamine [] Phenylephrine [] Epinephrine      OBJECTIVE:     VITAL SIGNS:  /64   Pulse 79   Temp 98.6 °F (37 °C) (Oral)   Resp 18   Ht 5' 6\" (1.676 m)   Wt 133 lb 6.1 oz (60.5 kg)   SpO2 95%   BMI 21.53 kg/m²   Tmax over 24 hours:  Temp (24hrs), Av.8 °F (37.1 °C), Min:98.4 °F (36.9 °C), Max:99.9 °F (37.7 °C)      Patient Vitals for the past 8 hrs:   BP Temp Temp src Pulse Resp SpO2   23 0630 127/64 -- -- 79 18 95 %   23 0615 133/64 -- -- 80 17 96 %   23 0600 134/68 98.6 °F (37 °C) Oral 88 17 96 %   23 0545 (!) 145/67 -- -- 92 20 96 %   23 0530 (!) 142/65 -- -- 93 19 97 %   23 0515 (!) 143/67 -- -- 92 19 95 %   23 0500 (!) 153/68 98.4 °F (36.9 °C) Oral 95 18 96 %   23 0445 (!) 145/65 -- -- 95 20 96 %   23 0430 (!) 142/69 -- -- 96 20 96 %   23 0415 (!) 143/68 -- -- 95 21 95 %   23 0400 (!) 151/69 98.6 °F (37 °C) Oral 98 18 95 %   23 0345 (!) 167/76 -- -- (!) 103 17 95 %   23 0330 -- -- -- (!) 102 21 91 %   23 0315 (!) 154/140 -- -- (!) 104 21 95 %   23 0300 (!) 177/71 98.8 °F (37.1 °C) Oral (!) 103 26 95 %   23 0245 (!) 159/81 -- -- (!) 102 18 94 %   23 0230 -- -- -- (!) 102 18 95 %   23 0215 -- -- -- (!) 102 17 96 %   23 0200 (!) 145/70 98.6 °F (37 °C) Oral 95 17 95 %   23 0145 (!) 157/68 -- -- 100 14 95 %   23 0130 (!) 144/53 -- -- 99 20 95 %   23 0115 (!) 167/149 -- -- (!) 101 16 97 %   23 0100 (!) 151/131 98.8 °F (37.1 °C) Oral (!) 101 23 96 %   23 0045 (!) 176/69 -- -- (!) 115 18 96 % 01/08/23 0030 (!) 172/72 -- -- (!) 114 21 96 %   01/08/23 0015 (!) 168/141 -- -- (!) 115 20 96 %   01/08/23 0000 (!) 173/78 99 °F (37.2 °C) Oral (!) 118 15 95 %   01/07/23 2345 (!) 159/127 -- -- (!) 116 16 97 %   01/07/23 2330 (!) 168/70 -- -- (!) 114 22 95 %         Intake/Output Summary (Last 24 hours) at 1/8/2023 0725  Last data filed at 1/8/2023 9502  Gross per 24 hour   Intake 2284.08 ml   Output 200 ml   Net 2084.08 ml     Date 01/08/23 0000 - 01/08/23 2359   Shift 4245-8427 8475-0067 1983-5629 24 Hour Total   INTAKE   I.V.(mL/kg) 1834. 1(30.3)   1834. 1(30.3)   IV Piggyback(mL/kg) 450(7.4)   450(7.4)   Shift Total(mL/kg) 2284. 1(37.8)   2284. 1(37.8)   OUTPUT   Urine(mL/kg/hr) 200   200   Shift Total(mL/kg) 200(3.3)   200(3.3)   Weight (kg) 60.5 60.5 60.5 60.5     Wt Readings from Last 3 Encounters:   01/07/23 133 lb 6.1 oz (60.5 kg)   08/23/22 180 lb (81.6 kg)   01/13/22 176 lb (79.8 kg)     Body mass index is 21.53 kg/m².         PHYSICAL EXAM:  GEN:  arousable and cooperative  EYES:   pupils equal, round, and reactive to light  HEENT:  Normocephalic, without obvious abnormality  LUNGS:  good air exchange and clear to auscultation  CV:    normal apical impulse and regular rate and rhythm  ABDOMEN:   soft, non-distended, and non-tender  MSK:    R AKA  NEURO:   Arousable, following commands, residual RUE deficit, able to grasp with left hand and wiggle left toes and R knee  EXTREMITIES:  distal pulses intact, R AKA      MEDICATIONS:  Scheduled Meds:   insulin lispro  0-16 Units SubCUTAneous Q4H    cefTRIAXone (ROCEPHIN) IV  1,000 mg IntraVENous Q24H    atorvastatin  40 mg Oral Nightly    aspirin  81 mg Oral Daily    therapeutic multivitamin-minerals  1 tablet Oral Daily with breakfast    sodium chloride  15 mL/kg IntraVENous Once    sodium chloride flush  5-40 mL IntraVENous 2 times per day    thiamine and folic acid IVPB   IntraVENous Daily    metoprolol  2.5 mg IntraVENous Q6H     Continuous Infusions: dexmedetomidine 0.3 mcg/kg/hr (01/08/23 5389)    dextrose      niCARdipine Stopped (01/08/23 4543)    heparin (PORCINE) Infusion 16 Units/kg/hr (01/08/23 0329)    sodium chloride 250 mL/hr at 01/07/23 2358    dextrose 5% and 0.45% NaCl with KCl 20 mEq 150 mL/hr at 01/08/23 0322    sodium chloride       PRN Meds:   glucose, 4 tablet, PRN  dextrose bolus, 125 mL, PRN   Or  dextrose bolus, 250 mL, PRN  glucagon (rDNA), 1 mg, PRN  dextrose, , Continuous PRN  heparin (porcine), 4,000 Units, PRN  heparin (porcine), 2,000 Units, PRN  dextrose bolus, 125 mL, PRN   Or  dextrose bolus, 250 mL, PRN  potassium chloride, 10 mEq, PRN  magnesium sulfate, 1,000 mg, PRN  sodium phosphate IVPB, 10 mmol, PRN   Or  sodium phosphate IVPB, 15 mmol, PRN   Or  sodium phosphate IVPB, 20 mmol, PRN  dextrose 5% and 0.45% NaCl with KCl 20 mEq, , Continuous PRN  sodium chloride flush, 5-40 mL, PRN  sodium chloride, , PRN  ondansetron, 4 mg, Q8H PRN   Or  ondansetron, 4 mg, Q6H PRN  polyethylene glycol, 17 g, Daily PRN  acetaminophen, 650 mg, Q6H PRN   Or  acetaminophen, 650 mg, Q6H PRN        SUPPORT DEVICES: [] Ventilator [] BIPAP  [x] Nasal Cannula [] Room Air      DATA:  Complete Blood Count:   Recent Labs     01/07/23  1646 01/08/23  0237   WBC 19.7* 22.6*   RBC 6.02* 5.11   HGB 17.8* 15.3   HCT 51.4* 44.5   MCV 85.4 87.1   MCH 29.6 29.9   MCHC 34.6 34.4   RDW 12.5 12.6    314   MPV 11.6 11.3        Last 3 Blood Glucose:   Recent Labs     01/07/23  1646 01/07/23  2324 01/08/23  0237   GLUCOSE 620* 398* 207*        PT/INR:    Lab Results   Component Value Date/Time    PROTIME 13.7 08/23/2022 11:39 PM    INR 1.1 08/23/2022 11:39 PM     PTT:    Lab Results   Component Value Date/Time    APTT 29.8 01/08/2023 02:37 AM       Comprehensive Metabolic Profile:   Recent Labs     01/07/23  1646 01/07/23  2324 01/08/23  0237   * 139 138   K 3.8 3.7 3.5*   CL 86* 98 102   CO2 22 27 28   BUN 27* 22* 20   CREATININE 1.22* 0.81 0.78   GLUCOSE 620* 398* 207*   CALCIUM 9.3 8.5* 8.5*   PROT 7.2  --   --    LABALBU 3.3*  --   --    BILITOT 0.4  --   --    ALKPHOS 125  --   --    AST 16  --   --    ALT 18  --   --       Magnesium:   Lab Results   Component Value Date/Time    MG 2.6 01/08/2023 02:37 AM    MG 2.3 01/07/2023 04:46 PM    MG 1.8 07/04/2019 04:40 AM     Phosphorus:   Lab Results   Component Value Date/Time    PHOS 2.0 01/08/2023 02:37 AM    PHOS 3.4 07/04/2019 04:40 AM     Ionized Calcium:   Lab Results   Component Value Date/Time    CAION 1.13 07/04/2019 04:40 AM    CAION 0.97 04/09/2019 12:21 AM    CAION 1.01 04/07/2019 07:47 PM        Urinalysis:   Lab Results   Component Value Date/Time    NITRU NEGATIVE 01/07/2023 05:08 PM    COLORU Yellow 01/07/2023 05:08 PM    PHUR 5.5 01/07/2023 05:08 PM    WBCUA 0 TO 2 01/07/2023 05:08 PM    RBCUA 0 TO 2 01/07/2023 05:08 PM    MUCUS NOT REPORTED 04/14/2019 01:01 AM    TRICHOMONAS NOT REPORTED 04/14/2019 01:01 AM    YEAST NOT REPORTED 04/14/2019 01:01 AM    BACTERIA NOT REPORTED 04/14/2019 01:01 AM    SPECGRAV 1.036 01/07/2023 05:08 PM    LEUKOCYTESUR NEGATIVE 01/07/2023 05:08 PM    UROBILINOGEN Normal 01/07/2023 05:08 PM    BILIRUBINUR NEGATIVE 01/07/2023 05:08 PM    GLUCOSEU 3+ 01/07/2023 05:08 PM    KETUA SMALL 01/07/2023 05:08 PM    AMORPHOUS NOT REPORTED 04/14/2019 01:01 AM       HgBA1c:    Lab Results   Component Value Date/Time    LABA1C 14.0 12/23/2021 08:30 AM     TSH:    Lab Results   Component Value Date/Time    TSH 0.45 01/07/2023 11:24 PM     Lactic Acid: No results found for: LACTA   Troponin: No results for input(s): TROPONINI in the last 72 hours.     ASSESSMENT:     Patient Active Problem List    Diagnosis Date Noted    Altered mental status 01/07/2023    DKA, type 2, not at goal [facility]) 01/07/2023    Pressure injury of right heel, unstageable ([facility] [address] 12/10/2020    Hx of AKA (above knee amputation), right ([facility] [address] 12/10/2020    Erectile dysfunction associated with type 2 diabetes mellitus ([facility] [address] 12/10/2020    Above knee amputation of right lower extremity ([facility]) 09/28/2019    Coronary artery disease 08/23/2019    Ischemic gangrene ([facility])     Pyogenic inflammation of bone ([facility])     Status post angiography of extremity 08/20/2019    Leukocytosis     Right-sided extracranial carotid artery stenosis 07/03/2019    Essential hypertension     Type 2 diabetes mellitus with diabetic neuropathy, without long-term current use of insulin ([facility])     Cellulitis of right lower extremity     Moderate malnutrition ([facility]) 04/26/2019    Acute CVA (cerebrovascular accident) ([facility]) 04/25/2019    Thrombocytosis     Right foot drop     Carotid stenosis, bilateral     Acute cerebral infarction associated with systemic hypoxia or ischemia ([facility])     Multi infarct state     Hepatitis     Metabolic encephalopathy     Abnormal CT of the head     PAD (peripheral artery disease) ([facility]) 04/07/2019    Subacute osteomyelitis of right tibia ([facility])           PLAN:     Neuro  Arousable, following commands  Precedex gtt  Neurology consulted, concern for PRES syndrome  MRI today  EEG today  Failed swallow study  CT head negative in ED  CTA head/neck showing severe stenosis in L subclavian artery, R TOAN, MCA, ICA and vertebral arteries  Thiamine and folic acid given in ED    Cardiology  Hx CABG, R ICA stent, vertebral artery stenosis, TOAN MCA and ICA stenosis  Vascular consulted  EKG in ED showing ST elevation in V1-V3  Cardiology consulted, recommending heparin gtt  Troponin 30-25-30  -250 in ED - initially on cardene gtt  Now off cardene gtt  Lopressor 2.5 q 6h  Qtc prolonged on arrival, 510    Pulmonary  2 L NC  CXR unremarkable    Renal  Cr 1.22  U out 200 + 1 unmeasured  D5 1/2 NS + KCL 20 @ 150 ml/hr    GI  NPO  Failed swallow study    ID  WBC 22.6  Afebrile  Vanco and zosyn in ED  Ceftriaxone  Blood cultures pending    Endo  Glucose now 140, was 620 on arrival  AG now 8  Insulin gtt discontinued  HDISS q 4 h  D5 1/2 NS + K 20 @ 150  ml/hr    ICU PROPHYLAXIS:  Stress ulcer:  [] PPI Agent  [] Y2Zcity [] Sucralfate  [x] Other: none  VTE:   [] Enoxaparin  [x] Unfract. Heparin Subcut  [] EPC Cuffs    NUTRITION:  [x] NPO [] Tube Feeding (Specify: ) [] TPN  [] PO    HOME MEDS RECONCILED: [] No  [x] Yes    CONSULTATION NEEDED:  [] No  [x] Yes    FAMILY UPDATED:    [] No  [x] Yes    TRANSFER OUT OF ICU:   [x] No  [] Yes      Karen Hudson M.D. Emergency Medicine Resident, PGY-2  1/8/2023 7:25 AM  Attending Physician Statement  I have discussed the care of Angy Pope, including pertinent history and exam findings,  with the resident. I have seen and examined the patient and the key elements of all parts of the encounter have been performed by me. I agree with the assessment, plan and orders as documented by the resident with additions . Wean precedex gtt   Mri brain today   Cont ceftriaxone     Total critical care time caring for this patient with life threatening, unstable organ failure, including direct patient contact, management of life support systems, review of data including imaging and labs, discussions with other team members and physicians at least 28   Min so far today, excluding procedures. Electronically signed by Priscilla Shipley MD on   1/8/23 at 9:29 PM EST    Please note that this chart was generated using voice recognition Dragon dictation software. Although every effort was made to ensure the accuracy of this automated transcription, some errors in transcription may have occurred.

## 2023-01-08 NOTE — PROGRESS NOTES
[address] [facility]  PROGRESS NOTE    Shift date: 1/8/2023  Shift day: Sunday   Shift # 1    Room # 6073/5910-35   Name: Evelin Savage                Christianity: Non-Rastafarian   Place of [facility]: Unknown    Referral: Routine Visit    Admit Date & Time: 1/7/2023  4:22 PM    Assessment:  Evelin Savage is a 61 y.o. male in the hospital because altered mental status. Upon entering the room writer observes patient is sleeping. Intervention:   offers silent prayer at bedside. Plan:  Chaplains will remain available to offer spiritual and emotional support as needed.       Electronically signed by Elaine Ruvalcaba Resident, on 1/8/2023 at 10:30 AM.  Dao Acevedo  [phone]       01/08/23 1029   Encounter Summary   Encounter Overview/Reason  Attempted Encounter   Service Provided For: Patient   Referral/Consult From: [employer]   Support System Unknown   Last Encounter  01/08/23   Complexity of Encounter Moderate   Begin Time 8219

## 2023-01-08 NOTE — CONSULTS
[facility] Neurology   IN-PATIENT SERVICE      NEUROLOGY CONSULT  NOTE            Date:   1/7/2023  Patient name:  Angy Pope  Date of admission:  1/7/2023  YOB: 1965      Chief Complaint:     Chief Complaint   Patient presents with    Altered Mental Status       Reason for Consult:      [facility]    History of Present Illness:      The patient is a 61 y.o. male patient brought into the ED for management of altered mentation, patient last seen well Thursday evening now almost 2 days later patient was found to be hypertensive tachycardic with repetitive movement of his chin and mouth, this shaved and dirty with poor nutrition and hearing, new abrasions of the right side of his head as well as the right hand,    -PMH significant for multivessel issue including peripheral vascular disease, stroke, osteomyelitis, right AKA, bilateral carotid stenosis type 2 diabetes, no prior alcohol use    -ED course: Lactic acid 2.3, urinalysis: Protein 3+, ketones 1.036, leukocyte esterase: Negative, TSH and T4 unremarkable, troponin 30, glucose initially was 620, creatinine 1.22, blood culture sent    -CT head W0: No acute intracranial normality, CT cervical spine: Motion artifact limits evaluation of lower cervical spine no acute abnormality seen  -CXR: No acute abnormality        Past Medical History:     Past Medical History:   Diagnosis Date    CAD (coronary artery disease) 04/07/2019    triple bypass    Cardiac arrest ([address] 04/07/2019    Diabetes mellitus ([address] 04/2019    ON RX    Hyperlipidemia 04/2019    ON RX HAS NOT HAD SINCE 06/30/2019    Hypertension 04/2019    ON RX NONE SINCE 06/2019    Osteolysis 04/07/2019    R Knee hardware    PEA (Pulseless electrical activity) ([address] 04/07/2019    PVD (peripheral vascular disease) ([address] 04/07/2019    Wound, open 09/2019    RIGHT STUMP DRAINING MOD AMT BLOODY DRAINAGE        Past Surgical History:     Past Surgical History:   Procedure Laterality Date    ABOVE KNEE AMPUTATION Right 09/28/2019    AMPUTATION ABOVE KNEE Right 9/28/2019    RIGHT ABOVE KNEE AMPUTATION performed by Satnam De Guzman MD at [address] [facility]  04/07/2019    MVD-Stv    CAROTID STENT PLACEMENT  07/03/2019    ACCULINK CAROTID STENT PLACED, MRI CONDITIONAL 5, 3T OK. CORONARY ARTERY BYPASS GRAFT  04/07/2019    x4 per Dr. Delores Marquez, [address] GRAFT N/A 4/7/2019    EMERGENT CPR,  CORONARY ARTERY BYPASS X4, ON PUMP ,CHEST LEFT OPEN; SWAN, ANGELA PER ANESTHESIA performed by Génesis Boone MD at [facility]      unsure if correct known tibial surgery    HARDWARE REMOVAL Right 04/07/2019    R Knee internal screw protruding through skin    HARDWARE REMOVAL Right 04/22/2019    HARDWARE REMOVAL RIGHT TIBIA,    LEG AMPUTATION BELOW KNEE Right 8/25/2019    RIGHT FOOT GUILLOTINE AMPUTATION performed by Santam De Guzman MD at [place] [address] Right 8/27/2019    RIGHT BELOW KNEE AMPUTATION FORMALIZATION performed by Satnam De Guzman MD at [address] [facility] N/A 4/8/2019    STERNAL  WOUND WASHOUT AND CLOSURE S/P CABG performed by Génesis Boone MD at [address] [place] [address] N/A 4/22/2019    HARDWARE REMOVAL RIGHT TIBIA, IRRIGATION AND DEBRIDEMENT [address] performed by Sebastien Fine MD at [facility] Right 2000    Laural Horn off roof, external fixation and internal hardware    TONSILLECTOMY  1974        Medications Prior to Admission:     Prior to Admission medications    Medication Sig Start Date End Date Taking?  Authorizing Provider   metoprolol tartrate (LOPRESSOR) 25 MG tablet Take 2 tablets by mouth 2 times daily 8/24/22   Ansley Ventura MD   clopidogrel (PLAVIX) 75 MG tablet TAKE 1 TABLET BY MOUTH DAILY 2/17/22   Asvhin Cornell MD   gabapentin (NEURONTIN) 300 MG capsule TAKE 1 CAPSULE BY MOUTH EVERY 8 HOURS 2/17/22 3/19/22  Moriah Patrick MD   metFORMIN (GLUCOPHAGE) 1000 MG tablet Take 1 tablet by mouth 2 times daily (with meals) 1/13/22 3/14/22  Moriah Patrick MD   insulin lispro, 1 Unit Dial, (HUMALOG KWIKPEN) 100 UNIT/ML SOPN INJECT SUBCUTANEOUSLY BEFORE MEALS AS DIRECTED PER SLIDING SCALE: SEE ATTACHED FOR SLIDING SCALE DIRECTIONS: Hold all insulin and notify Dr immediately if BG <60. -200: 2 UNITS; -250: 4UNITS; 251-300 6 UNITS; 301-350: 8 UNITS  Patient not taking: Reported on 1/13/2022 1/5/22   Moriah Patrick MD   lisinopril-hydroCHLOROthiazide (PRINZIDE;ZESTORETIC) 20-25 MG per tablet Take 2 tablets by mouth daily .   Take 1 pill if blood pressure is less than 130/80 12/23/21   Moriah Patrick MD   insulin glargine (LANTUS SOLOSTAR) 100 UNIT/ML injection pen Inject 24 Units into the skin nightly 11/9/21 1/13/22  Olinda Harrison MD   Insulin Syringe-Needle U-100 30G X 5/16\" 1 ML MISC 1 each by Does not apply route daily 11/9/21   Olinda Harrison MD   atorvastatin (LIPITOR) 40 MG tablet Take 1 tablet by mouth nightly 10/19/21   Olga Ross MD   metoprolol tartrate (LOPRESSOR) 50 MG tablet Take 1 tablet by mouth 2 times daily 10/19/21   Olga Ross MD   blood glucose test strips (ONETOUCH VERIO) strip TEST 2 TIMES A DAY & AS NEEDED FOR SYMPTOMS OF IRREGULAR BLOOD GLUCOSE. 5/28/21   Carli Andersen MD   Alcohol Swabs (B-D SINGLE USE SWABS REGULAR) PADS USE AS DIRECTED WITH TESTING SUPPLIES 5/28/21   Carli Andersen MD   Lancets ([address]) [address] USE AS DIRECTED TO TEST BLOOD SUGAR 5/28/21   Carli Andersen MD   sildenafil (VIAGRA) 100 MG tablet Take 1 tablet by mouth as needed for Erectile Dysfunction 1/7/21   Carli Andersen MD   Blood Glucose Monitoring Suppl (ONE TOUCH ULTRA 2) w/Device KIT U UTD ONCE D 9/27/19   Historical Provider, MD   acetaminophen (TYLENOL) 500 MG tablet Take 2 tablets by mouth every 8 hours as needed for Pain 9/30/19   DO [place]t [address] [facility] by Does not apply route For 5 years 19   Aliyah Multani MD   Blood Glucose Monitoring Suppl [facility] BLOOD GLUCOSE METER) w/Device KIT 1 kit by Does not apply route once for 1 dose 19  Aliyah Multani MD   Insulin Pen Needle 32G X 6 MM MISC 1 each by Does not apply route daily 19   Aliyah Multani MD   furosemide (LASIX) 20 MG tablet Take 1 tablet by mouth daily  Patient not taking: Reported on 2021   ISAAC Beebe - CNP   aspirin 81 MG chewable tablet Take 1 tablet by mouth daily 5/10/19   Giorgio Bustamante MD   Multiple Vitamins-Minerals (THERAPEUTIC MULTIVITAMIN-MINERALS) tablet Take 1 tablet by mouth daily (with breakfast) 5/10/19   Giorgio Bustamante MD        Allergies:     Patient has no known allergies. Social History:     Tobacco:    reports that he has been smoking cigarettes. He started smoking about 2 years ago. He has a 20.00 pack-year smoking history. He has never used smokeless tobacco.  Alcohol:      reports no history of alcohol use. Drug Use:  reports no history of drug use.     Family History:     Family History   Problem Relation Age of Onset    Heart Disease Mother         CAD WITH STENT    Diabetes Mother     Stroke Mother     Diabetes Father     Stroke Father     Heart Disease Father         CAD    High Blood Pressure Father     High Blood Pressure Sister     Diabetes Brother     Heart Disease Brother     High Blood Pressure Brother        Review of Systems:     Unable to assess due to patient mentation    Physical Exam:   BP 84/64   Pulse (!) 110   Temp 99.9 °F (37.7 °C) (Oral)   Resp 15   Wt 180 lb (81.6 kg)   SpO2 91%   BMI 29.05 kg/m²   Temp (24hrs), Av.9 °F (37.7 °C), Min:99.9 °F (37.7 °C), Max:99.9 °F (37.7 °C)        General examination:      General Appearance:  alert, well appearing, and in no acute distress  HEENT: Normocephalic, atraumatic, moist mucus membranes  Neck: supple, no carotid bruits, (-) nuchal rigidity  Lungs:  Respirations unlabored, chest wall no deformity, BS normal  Cardiovascular: normal rate, regular rhythm  Abdomen: Soft, nontender, nondistended, normal bowel sounds  Skin: No gross lesions, rashes, bruising or bleeding on exposed skin area  Extremities:  peripheral pulses palpable, no cyanosis, clubbing or edema  Psych: normal affect      Neurological examination:      Mental status   Alert but not oriented, global aphasia, patient is not answering questions not following commands, Mumbling     Cranial nerves   II - visual fields intact to confrontation; pupils reactive  III, IV, VI - extraocular muscles intact; no SONJA; no nystagmus; no ptosis   V - normal facial sensation                                                               VII - normal facial symmetry                                                             VIII - intact hearing                                                                             IX, X - symmetrical palate elevation                                               XI - symmetrical shoulder shrug                                                       XII - midline tongue without atrophy or fasciculation     Motor function  Unable to accurately check for strength however patient has no drift of the left side and right upper extremity mild drift, right lower extremity AKA   Sensory function Patient is withdrawing to pain in all 4 extremities   Cerebellar Unable to assess due to patient mentation     Reflex function 2/4 symmetric throughout . Downgoing plantar response bilaterally.  (-)Beth's sign bilaterally      Gait                  Not assessed           Diagnostics:      Laboratory Testing:  CBC:   Recent Labs     01/07/23  1646   WBC 19.7*   HGB 17.8*        BMP:    Recent Labs     01/07/23  1646   *   K 3.8   CL 86*   CO2 22   BUN 27*   CREATININE 1.22*   GLUCOSE 620*         Lab Results   Component Value Date    CHOL 242 (H) 01/13/2022    LDLCHOLESTEROL      01/13/2022    HDL 24 (L) 01/13/2022    TRIG 637 (H) 01/13/2022    ALT 18 01/07/2023    AST 16 01/07/2023    TSH 0.60 01/07/2023    INR 1.1 08/23/2022    LABA1C 14.0 12/23/2021    LABMICR 8 12/15/2020    [id number] 1994 (H) 04/17/2019       No results found for: PHENYTOIN, PHENYTOIN, VALPROATE, CBMZ      Imaging/Diagnostics:  CT HEAD WO CONTRAST    Result Date: 1/7/2023  EXAMINATION: CT OF THE HEAD WITHOUT CONTRAST; CT OF THE CERVICAL SPINE WITHOUT CONTRAST 1/7/2023 6:22 pm TECHNIQUE: CT of the head and the cervical spine was performed without the administration of intravenous contrast. Multiplanar reformatted images are provided for review. Automated exposure control, iterative reconstruction, and/or weight based adjustment of the mA/kV was utilized to reduce the radiation dose to as low as reasonably achievable. COMPARISON: 08/23/2022 HISTORY: Altered mental status, last known well several days ago. FINDINGS: BRAIN/VENTRICLES: No acute intracranial hemorrhage, mass effect, or midline shift. No abnormal extra-axial fluid collection. The gray-white differentiation is maintained without evidence of an acute infarct. Stable encephalomalacia in the frontal lobes, left greater than right, compatible with chronic infarcts. Age commensurate parenchymal volume loss with mild chronic small vessel ischemic changes. No hydrocephalus. ORBITS: The visualized portion of the orbits demonstrate no acute abnormality. SINUSES: The visualized paranasal sinuses and mastoid air cells demonstrate no acute abnormality. SOFT TISSUES/SKULL:  No acute abnormality of the visualized skull or soft tissues. CERVICAL SPINE:  Motion artifact limits evaluation of the lower cervical spine. No acute fracture seen. No spondylolisthesis. Stable scattered degenerative changes. Emphysematous changes at the lung apices. Bilateral carotid calcification with right carotid stent. No acute intracranial abnormality.   Motion artifact limits evaluation of the lower cervical spine but no acute abnormality seen. CT CERVICAL SPINE WO CONTRAST    Result Date: 1/7/2023  EXAMINATION: CT OF THE HEAD WITHOUT CONTRAST; CT OF THE CERVICAL SPINE WITHOUT CONTRAST 1/7/2023 6:22 pm TECHNIQUE: CT of the head and the cervical spine was performed without the administration of intravenous contrast. Multiplanar reformatted images are provided for review. Automated exposure control, iterative reconstruction, and/or weight based adjustment of the mA/kV was utilized to reduce the radiation dose to as low as reasonably achievable. COMPARISON: 08/23/2022 HISTORY: Altered mental status, last known well several days ago. FINDINGS: BRAIN/VENTRICLES: No acute intracranial hemorrhage, mass effect, or midline shift. No abnormal extra-axial fluid collection. The gray-white differentiation is maintained without evidence of an acute infarct. Stable encephalomalacia in the frontal lobes, left greater than right, compatible with chronic infarcts. Age commensurate parenchymal volume loss with mild chronic small vessel ischemic changes. No hydrocephalus. ORBITS: The visualized portion of the orbits demonstrate no acute abnormality. SINUSES: The visualized paranasal sinuses and mastoid air cells demonstrate no acute abnormality. SOFT TISSUES/SKULL:  No acute abnormality of the visualized skull or soft tissues. CERVICAL SPINE:  Motion artifact limits evaluation of the lower cervical spine. No acute fracture seen. No spondylolisthesis. Stable scattered degenerative changes. Emphysematous changes at the lung apices. Bilateral carotid calcification with right carotid stent. No acute intracranial abnormality. Motion artifact limits evaluation of the lower cervical spine but no acute abnormality seen. XR CHEST PORTABLE    Result Date: 1/7/2023  EXAMINATION: ONE XRAY VIEW OF THE CHEST 1/7/2023 4:40 pm COMPARISON: CT on 08/23/2022, radiograph on 05/13/2019 HISTORY: Altered mental status.  FINDINGS: Patient is mildly rotated. Normal cardiomediastinal silhouette. Post CABG changes. No acute airspace disease, pleural effusion, or pneumothorax. No acute abnormality.            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

## 2023-01-08 NOTE — ED NOTES
Pt incontinent of urine, complete linen change, primo applied.  Pt repositioned     Danish Guillaume RN  01/07/23 [id number] Disabled

## 2023-01-08 NOTE — H&P
Critical Care - History and Physical Examination    Patient's name:  Patito Thomson Record Number: [id number]  Patient's account/billing number: [de-identified]  Patient's YOB: 1965  Age: 61 y.o. Date of Admission: 1/7/2023  4:22 PM  Date of History and Physical Examination: 1/7/2023      Primary Care Physician: Ham Guaman MD  Attending Physician: dr Pillo Ng    Code Status: Prior    Chief complaint:   Chief Complaint   Patient presents with    Altered Mental Status         HISTORY OF PRESENT ILLNESS:      History was obtained from chart review. Angy Pope is a 61 y.o. with PMH of   - extensive PVD   - Right toe gangrene/osteomyelitis s/p AKA 2019  - MV-CAD s/p CABG in 2019  - PEA arrest 2019?  - ICM HFrEF 40%  - DM type 2  - S/p proximal R ICA stent deployment  - Complete occlusion of left ICA and Left vertebral artery  - hx of CVA    Presented to ER with AMS, last well known Thursday evening about 2 days ago. Patient was found to be disheveled, poor nutrition. In ED, patient was found to be hypertensive, tachycardic, BP in 200's/100's, 's saturating well on room air. Labs showed ALMITA creatinine 1.22, lactic 2.3, glucose 620, AG 26, bicarb 22, bhb 2.43. Ph 7.35/52.3/28.8/52.6  LFT unremarkable, TSH normal  WBC 19.7, hb 17.2, platelets normal    UA negative for UTI    EKG showed STEMI V2 - V3: cardiology on board - want heparin gtt. Trop 30 > 25. CTA HEAD AND NECK  1. 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 2.  Patent right cervical internal carotid artery stent demonstrates   mild-to-moderate in stent stenosis. Follow-up catheter angiogram may be   helpful for further evaluation. 3.  Multifocal areas of moderate to severe stenosis in the right middle   cerebral artery branches. 4.  Multifocal severe stenoses in the right anterior cerebral artery A2 and   A3 segments.    5.  Focal severe stenosis in the left subclavian artery secondary to   noncalcified atherosclerotic plaque at the level of the left vertebral artery   origin. CT head is negative  CXR unremarkable    Neurology consulted - stated PRES syndrome - AMS; MRI EEG    PLAN  - consult vascular  - start DKA protocol  - continue heparin gtt  - send sepsis work up  - continue cardene gtt  - start folic acid, A87, thiamine  - continue vanc and zosyn  - neurology on board; Get MRI      PAST MEDICAL HISTORY:         Diagnosis Date    CAD (coronary artery disease) 04/07/2019    triple bypass    Cardiac arrest ([facility] 04/07/2019    Diabetes mellitus ([facility] 04/2019    ON RX    Hyperlipidemia 04/2019    ON RX HAS NOT HAD SINCE 06/30/2019    Hypertension 04/2019    ON RX NONE SINCE 06/2019    Osteolysis 04/07/2019    R Knee hardware    PEA (Pulseless electrical activity) ([facility] 04/07/2019    PVD (peripheral vascular disease) ([facility] 04/07/2019    Wound, open 09/2019    RIGHT STUMP DRAINING MOD AMT BLOODY DRAINAGE         PAST SURGICAL HISTORY:         Procedure Laterality Date    ABOVE KNEE AMPUTATION Right 09/28/2019    AMPUTATION ABOVE KNEE Right 9/28/2019    RIGHT ABOVE KNEE AMPUTATION performed by Aly Jones MD at [address] [place] [address]  04/07/2019    MVD-Stv    CAROTID STENT PLACEMENT  07/03/2019    ACCULINK CAROTID STENT PLACED, MRI CONDITIONAL 5, 3T OK.      CORONARY ARTERY BYPASS GRAFT  04/07/2019    x4 per Dr. Chencho Saba, [address] GRAFT N/A 4/7/2019    EMERGENT CPR,  CORONARY ARTERY BYPASS X4, ON PUMP ,CHEST LEFT OPEN; SWAN, ANGELA PER ANESTHESIA performed by Izabella Liu MD at [facility]      unsure if correct known tibial surgery    HARDWARE REMOVAL Right 04/07/2019    R Knee internal screw protruding through skin    HARDWARE REMOVAL Right 04/22/2019    HARDWARE REMOVAL RIGHT TIBIA,    LEG AMPUTATION BELOW KNEE Right 8/25/2019    RIGHT FOOT GUILLOTINE AMPUTATION performed by Tor Ann MD at [place] [address] Right 8/27/2019    RIGHT BELOW KNEE AMPUTATION FORMALIZATION performed by Tor Ann MD at [address] [facility] N/A 4/8/2019    STERNAL  WOUND WASHOUT AND CLOSURE S/P CABG performed by Susan Sam MD at [facility] N/A 4/22/2019    HARDWARE REMOVAL RIGHT TIBIA, IRRIGATION AND DEBRIDEMENT [address] performed by Stephen Maciel MD at [facility] Right 2000    Yojana Serge off roof, external fixation and internal hardware    TONSILLECTOMY  1974       ALLERGIES:      No Known Allergies      HOME MEDS: :      Prior to Admission medications    Medication Sig Start Date End Date Taking? Authorizing Provider   metoprolol tartrate (LOPRESSOR) 25 MG tablet Take 2 tablets by mouth 2 times daily 8/24/22   Maryam Barahona MD   clopidogrel (PLAVIX) 75 MG tablet TAKE 1 TABLET BY MOUTH DAILY 2/17/22   Rehan Will MD   gabapentin (NEURONTIN) 300 MG capsule TAKE 1 CAPSULE BY MOUTH EVERY 8 HOURS 2/17/22 3/19/22  Rehan Will MD   metFORMIN (GLUCOPHAGE) 1000 MG tablet Take 1 tablet by mouth 2 times daily (with meals) 1/13/22 3/14/22  Rehan Will MD   insulin lispro, 1 Unit Dial, (HUMALOG KWIKPEN) 100 UNIT/ML SOPN INJECT SUBCUTANEOUSLY BEFORE MEALS AS DIRECTED PER SLIDING SCALE: SEE ATTACHED FOR SLIDING SCALE DIRECTIONS: Hold all insulin and notify Dr immediately if BG <60. -200: 2 UNITS; -250: 4UNITS; 251-300 6 UNITS; 301-350: 8 UNITS  Patient not taking: Reported on 1/13/2022 1/5/22   Rehan Will MD   lisinopril-hydroCHLOROthiazide (PRINZIDE;ZESTORETIC) 20-25 MG per tablet Take 2 tablets by mouth daily .   Take 1 pill if blood pressure is less than 130/80 12/23/21   Rehan Will MD   insulin glargine (LANTUS SOLOSTAR) 100 UNIT/ML injection pen Inject 24 Units into the skin nightly 11/9/21 1/13/22  Maryanne Mcgovern MD   Insulin Syringe-Needle U-100 30G X 5/16\" 1 ML MISC 1 each by Does not apply route daily 11/9/21   Joselyn Gillespie MD   atorvastatin (LIPITOR) 40 MG tablet Take 1 tablet by mouth nightly 10/19/21   Pati Arzola MD   metoprolol tartrate (LOPRESSOR) 50 MG tablet Take 1 tablet by mouth 2 times daily 10/19/21   Pati Arzola MD   blood glucose test strips (ONETOUCH VERIO) strip TEST 2 TIMES A DAY & AS NEEDED FOR SYMPTOMS OF IRREGULAR BLOOD GLUCOSE. 5/28/21   Ravinder Brantley MD   Alcohol Swabs (B-D SINGLE USE SWABS REGULAR) PADS USE AS DIRECTED WITH TESTING SUPPLIES 5/28/21   Ravinder Brantley MD   Lancets ([address]) [address] USE AS DIRECTED TO TEST BLOOD SUGAR 5/28/21   Ravinder Brantley MD   sildenafil (VIAGRA) 100 MG tablet Take 1 tablet by mouth as needed for Erectile Dysfunction 1/7/21   Ravinder Brantley MD   Blood Glucose Monitoring Suppl (ONE TOUCH ULTRA 2) w/Device KIT U UTD ONCE D 9/27/19   Historical Provider, MD   acetaminophen (TYLENOL) 500 MG tablet Take 2 tablets by mouth every 8 hours as needed for Pain 9/30/19   David Watts DO   Handicap Placard [address] [facility] by Does not apply route For 5 years 9/27/19   Ravinder Brantley MD   Blood Glucose Monitoring Suppl (ACURA BLOOD GLUCOSE METER) w/Device KIT 1 kit by Does not apply route once for 1 dose 9/27/19 9/27/19  Ravinder Brantley MD   Insulin Pen Needle 32G X 6 MM MISC 1 each by Does not apply route daily 9/27/19   Ravinder Brantley MD   furosemide (LASIX) 20 MG tablet Take 1 tablet by mouth daily  Patient not taking: Reported on 11/16/2021 7/4/19   Woody Keys, APRN - CNP   aspirin 81 MG chewable tablet Take 1 tablet by mouth daily 5/10/19   Jenelle Conner MD   Multiple Vitamins-Minerals (THERAPEUTIC MULTIVITAMIN-MINERALS) tablet Take 1 tablet by mouth daily (with breakfast) 5/10/19   Jenelle Conner MD       SOCIAL HISTORY:       TOBACCO:   reports that he has been smoking cigarettes. He started smoking about 2 years ago. He has a 20.00 pack-year smoking history.  He has never used smokeless tobacco.  ETOH: reports no history of alcohol use. DRUGS:  reports no history of drug use.   OCCUPATION:       FAMILY HISTORY:          Problem Relation Age of Onset    Heart Disease Mother         CAD WITH STENT    Diabetes Mother     Stroke Mother     Diabetes Father     Stroke Father     Heart Disease Father         CAD    High Blood Pressure Father     High Blood Pressure Sister     Diabetes Brother     Heart Disease Brother     High Blood Pressure Brother        REVIEW OF SYSTEMS (ROS):     Review of Systems -   General ROS: negative  Psychological ROS: negative  Ophthalmic ROS: negative  ENT ROS: negative  Allergy and Immunology ROS: negative  Hematological and Lymphatic ROS: negative  Endocrine ROS: negative  Breast ROS: negative  Respiratory ROS: no cough, shortness of breath, or wheezing  Cardiovascular ROS: no chest pain or dyspnea on exertion  Gastrointestinal ROS:negative  Genito-Urinary ROS: negative  Musculoskeletal ROS: negative  Neurological ROS: negative  Dermatological ROS: negative    OBJECTIVE:     VITAL SIGNS:  BP (!) 183/69 Comment: right arm  Pulse (!) 110   Temp 99.9 °F (37.7 °C) (Oral)   Resp 18   Wt 180 lb (81.6 kg)   SpO2 96%   BMI 29.05 kg/m²   Tmax over 24 hours:  Temp (24hrs), Av.9 °F (37.7 °C), Min:99.9 °F (37.7 °C), Max:99.9 °F (37.7 °C)      Patient Vitals for the past 8 hrs:   BP Temp Temp src Pulse Resp SpO2 Weight   23 -- -- -- (!) 110 18 96 % --   23 (!) 183/69 -- -- (!) 110 22 95 % --   23 (!) 170/79 -- -- (!) 112 25 94 % --   23 (!) 161/71 -- -- (!) 111 11 -- --   23 84/64 -- -- (!) 110 15 91 % --   23 (!) 167/71 -- -- (!) 110 14 92 % --   23 (!) 148/120 -- -- (!) 110 18 -- --   23 (!) 162/84 -- -- (!) 108 17 -- --   23 (!) 145/66 -- -- -- -- -- --   23 1830 (!) 171/80 -- -- (!) 109 20 -- --   23 1800 (!) 217/93 -- -- (!) 110 21 -- --   23 1745 (!) 216/90 -- -- (!) 109 17 -- --   01/07/23 1732 (!) 221/106 -- -- (!) 109 17 -- --   01/07/23 1704 -- -- -- -- -- -- 180 lb (81.6 kg)   01/07/23 1700 (!) 230/107 -- -- (!) 107 21 -- --   01/07/23 1645 (!) 211/195 -- -- (!) 109 19 -- --   01/07/23 1641 -- 99.9 °F (37.7 °C) Oral -- -- -- --   01/07/23 1625 (!) 259/104 -- -- (!) 136 22 93 % --   01/07/23 1624 (!) 259/111 -- -- (!) 137 12 94 % --   01/07/23 1623 -- -- -- (!) 137 22 95 % --       No intake or output data in the 24 hours ending 01/07/23 2136    Wt Readings from Last 3 Encounters:   01/07/23 180 lb (81.6 kg)   08/23/22 180 lb (81.6 kg)   01/13/22 176 lb (79.8 kg)     Body mass index is 29.05 kg/m². PHYSICAL EXAM:  Constitutional: Appears well, no distress  EENT: neck supple with midline trachea. Neck: Supple, symmetrical, trachea midline, no adenopathy,  no jvd, skin normal  Respiratory: clear to auscultation, no wheezes or rales and unlabored breathing.  No intercostal tenderness  Cardiovascular: regular rate and rhythm, normal S1, S2, no murmur noted  Abdomen: soft, nontender, nondistended, no masses or organomegaly  Extremities:   no pedal edema, no clubbing or cyanosis    MEDICATIONS:  Scheduled Meds:   magnesium sulfate  2,000 mg IntraVENous Once     Continuous Infusions:   niCARdipine 2.5 mg/hr (01/07/23 2132)    heparin (PORCINE) Infusion 12 Units/kg/hr (01/07/23 2049)     PRN Meds:   heparin (porcine), 4,000 Units, PRN  heparin (porcine), 2,000 Units, PRN          ABGs:   Lab Results   Component Value Date/Time    OYF7SFV 32 04/19/2019 05:15 AM    FIO2 INFORMATION NOT PROVIDED 01/07/2023 08:35 PM       DATA:  Complete Blood Count:   Recent Labs     01/07/23  1646   WBC 19.7*   RBC 6.02*   HGB 17.8*   HCT 51.4*   MCV 85.4   MCH 29.6   MCHC 34.6   RDW 12.5      MPV 11.6        Last 3 Blood Glucose:   Recent Labs     01/07/23  1646   GLUCOSE 620*        PT/INR:    Lab Results   Component Value Date/Time    PROTIME 13.7 08/23/2022 11:39 PM    INR 1.1 08/23/2022 11:39 PM     PTT:    Lab Results   Component Value Date/Time    APTT 23.8 01/07/2023 04:46 PM       Comprehensive Metabolic Profile:   Recent Labs     01/07/23  1646   *   K 3.8   CL 86*   CO2 22   BUN 27*   CREATININE 1.22*   GLUCOSE 620*   CALCIUM 9.3   PROT 7.2   LABALBU 3.3*   BILITOT 0.4   ALKPHOS 125   AST 16   ALT 18      Magnesium:   Lab Results   Component Value Date/Time    MG 2.3 01/07/2023 04:46 PM    MG 1.8 07/04/2019 04:40 AM    MG 1.9 04/25/2019 04:39 AM     Phosphorus:   Lab Results   Component Value Date/Time    PHOS 3.4 07/04/2019 04:40 AM     Ionized Calcium:   Lab Results   Component Value Date/Time    CAION 1.13 07/04/2019 04:40 AM    CAION 0.97 04/09/2019 12:21 AM    CAION 1.01 04/07/2019 07:47 PM        Urinalysis:   Lab Results   Component Value Date/Time    NITRU NEGATIVE 01/07/2023 05:08 PM    COLORU Yellow 01/07/2023 05:08 PM    PHUR 5.5 01/07/2023 05:08 PM    WBCUA 0 TO 2 01/07/2023 05:08 PM    RBCUA 0 TO 2 01/07/2023 05:08 PM    MUCUS NOT REPORTED 04/14/2019 01:01 AM    TRICHOMONAS NOT REPORTED 04/14/2019 01:01 AM    YEAST NOT REPORTED 04/14/2019 01:01 AM    BACTERIA NOT REPORTED 04/14/2019 01:01 AM    SPECGRAV 1.036 01/07/2023 05:08 PM    LEUKOCYTESUR NEGATIVE 01/07/2023 05:08 PM    UROBILINOGEN Normal 01/07/2023 05:08 PM    BILIRUBINUR NEGATIVE 01/07/2023 05:08 PM    GLUCOSEU 3+ 01/07/2023 05:08 PM    KETUA SMALL 01/07/2023 05:08 PM    AMORPHOUS NOT REPORTED 04/14/2019 01:01 AM       HgBA1c:    Lab Results   Component Value Date/Time    LABA1C 14.0 12/23/2021 08:30 AM     TSH:    Lab Results   Component Value Date/Time    TSH 0.60 01/07/2023 04:46 PM       Lactic Acid: No results found for: LACTA   Troponin: No results for input(s): TROPONINI in the last 72 hours.     Electrocardiogram:       Last Echocardiogram findings:   (See actual reports for details)      Radiological imaging  CT HEAD WO CONTRAST    Result Date: 1/7/2023  EXAMINATION: CT OF THE HEAD WITHOUT CONTRAST; CT OF THE CERVICAL SPINE WITHOUT CONTRAST 1/7/2023 6:22 pm TECHNIQUE: CT of the head and the cervical spine was performed without the administration of intravenous contrast. Multiplanar reformatted images are provided for review. Automated exposure control, iterative reconstruction, and/or weight based adjustment of the mA/kV was utilized to reduce the radiation dose to as low as reasonably achievable. COMPARISON: 08/23/2022 HISTORY: Altered mental status, last known well several days ago. FINDINGS: BRAIN/VENTRICLES: No acute intracranial hemorrhage, mass effect, or midline shift. No abnormal extra-axial fluid collection. The gray-white differentiation is maintained without evidence of an acute infarct. Stable encephalomalacia in the frontal lobes, left greater than right, compatible with chronic infarcts. Age commensurate parenchymal volume loss with mild chronic small vessel ischemic changes. No hydrocephalus. ORBITS: The visualized portion of the orbits demonstrate no acute abnormality. SINUSES: The visualized paranasal sinuses and mastoid air cells demonstrate no acute abnormality. SOFT TISSUES/SKULL:  No acute abnormality of the visualized skull or soft tissues. CERVICAL SPINE:  Motion artifact limits evaluation of the lower cervical spine. No acute fracture seen. No spondylolisthesis. Stable scattered degenerative changes. Emphysematous changes at the lung apices. Bilateral carotid calcification with right carotid stent. No acute intracranial abnormality. Motion artifact limits evaluation of the lower cervical spine but no acute abnormality seen. CT CERVICAL SPINE WO CONTRAST    Result Date: 1/7/2023  EXAMINATION: CT OF THE HEAD WITHOUT CONTRAST; CT OF THE CERVICAL SPINE WITHOUT CONTRAST 1/7/2023 6:22 pm TECHNIQUE: CT of the head and the cervical spine was performed without the administration of intravenous contrast. Multiplanar reformatted images are provided for review.  Automated exposure control, iterative reconstruction, and/or weight based adjustment of the mA/kV was utilized to reduce the radiation dose to as low as reasonably achievable. COMPARISON: 08/23/2022 HISTORY: Altered mental status, last known well several days ago. FINDINGS: BRAIN/VENTRICLES: No acute intracranial hemorrhage, mass effect, or midline shift. No abnormal extra-axial fluid collection. The gray-white differentiation is maintained without evidence of an acute infarct. Stable encephalomalacia in the frontal lobes, left greater than right, compatible with chronic infarcts. Age commensurate parenchymal volume loss with mild chronic small vessel ischemic changes. No hydrocephalus. ORBITS: The visualized portion of the orbits demonstrate no acute abnormality. SINUSES: The visualized paranasal sinuses and mastoid air cells demonstrate no acute abnormality. SOFT TISSUES/SKULL:  No acute abnormality of the visualized skull or soft tissues. CERVICAL SPINE:  Motion artifact limits evaluation of the lower cervical spine. No acute fracture seen. No spondylolisthesis. Stable scattered degenerative changes. Emphysematous changes at the lung apices. Bilateral carotid calcification with right carotid stent. No acute intracranial abnormality. Motion artifact limits evaluation of the lower cervical spine but no acute abnormality seen. XR CHEST PORTABLE    Result Date: 1/7/2023  EXAMINATION: ONE XRAY VIEW OF THE CHEST 1/7/2023 4:40 pm COMPARISON: CT on 08/23/2022, radiograph on 05/13/2019 HISTORY: Altered mental status. FINDINGS: Patient is mildly rotated. Normal cardiomediastinal silhouette. Post CABG changes. No acute airspace disease, pleural effusion, or pneumothorax. No acute abnormality.      CTA HEAD NECK W CONTRAST    Result Date: 1/7/2023  EXAMINATION: CTA OF THE HEAD AND NECK WITH CONTRAST 1/7/2023 6:45 pm: TECHNIQUE: CTA of the head and neck was performed with the administration of intravenous contrast. Multiplanar reformatted images are provided for review. MIP images are provided for review. Stenosis of the internal carotid arteries measured using NASCET criteria. Automated exposure control, iterative reconstruction, and/or weight based adjustment of the mA/kV was utilized to reduce the radiation dose to as low as reasonably achievable. COMPARISON: Noncontrast CT head done 08/23/2022. CT angiogram neck done 07/22/2019. CT angiogram head and neck done 04/18/2019. HISTORY: ORDERING SYSTEM PROVIDED HISTORY: [facility] thursday TECHNOLOGIST PROVIDED HISTORY: [facility], [facility] thursday Decision Support Exception - unselect if not a suspected or confirmed emergency medical condition->Emergency Medical Condition (MA) Reason for Exam: AMS, found down, possible fall [facility] thursday FINDINGS: CTA NECK: AORTIC ARCH/ARCH VESSELS: No dissection or arterial injury. Atherosclerosis in the visualized thoracic aorta, right brachiocephalic and bilateral subclavian arteries. No hemodynamically significant stenosis in the right brachiocephalic and subclavian arteries. Focal severe stenosis of the left subclavian artery secondary to noncalcified atherosclerotic plaque in the region of the left vertebral artery origin. CAROTID ARTERIES: Atherosclerosis in the bilateral common carotid arteries without acute injury or dissection. No hemodynamically significant stenosis in the right common carotid artery. Focal 50% stenosis at the origin of the left common carotid artery. Patent stent in the proximal to mid right cervical internal carotid artery demonstrates mild-to-moderate in stent stenosis. Chronic complete occlusion of the left cervical internal carotid artery at the origin. VERTEBRAL ARTERIES: Mild atherosclerotic narrowing at the origin of the right vertebral artery. The right cervical vertebral artery is otherwise patent without evidence of dissection. Chronic complete occlusion of the left cervical vertebral artery at the origin. SOFT TISSUES: The visualized lung apices demonstrate emphysema, respiratory motion and subsegmental atelectasis/scarring. No cervical or superior mediastinal lymphadenopathy. The larynx and pharynx are unremarkable. No acute abnormality of the salivary and thyroid glands. BONES: No acute osseous abnormality. Multilevel degenerative changes in the visualized spine are centered at C5-C6. Partially visualized median sternotomy. CTA HEAD: ANTERIOR CIRCULATION: Occlusion of the left internal carotid artery with reconstitution of flow in the distal supraclinoid segment at the level of the PCOM origin. Atherosclerosis in the right intracranial internal carotid artery results in 25-50% stenosis. Atherosclerotic irregularity of the bilateral anterior and middle cerebral arteries. Mild atherosclerotic narrowing in the right middle cerebral artery M1 segment. Multifocal areas of moderate to severe stenosis in the right middle cerebral artery branches. Mild atherosclerotic narrowing in the left middle cerebral artery M1 segment. Multifocal areas of mild-to-moderate stenosis in the left middle cerebral artery branches. Multifocal severe stenoses in the right anterior cerebral artery A2 and A3 segments. No focal hemodynamically significant stenosis or occlusion in the proximal left anterior cerebral artery. No aneurysm. POSTERIOR CIRCULATION: The right intracranial vertebral artery is diffusely small but grossly patent, underlying disease is difficult to exclude. Occlusion of the left intracranial vertebral artery with some areas of intermittent reconstitution through collateral flow. Diffusely small but patent basilar artery. Prominent bilateral posterior communicating arteries. No focal hemodynamically significant stenosis or occlusion in the proximal bilateral posterior cerebral arteries. No aneurysm. Moderate to severe stenosis in the proximal right superior cerebellar artery.  OTHER: No dural venous sinus thrombosis on this non-dedicated study. BRAIN: Please refer to noncontrast CT head report done same day. 1.  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 2.  Patent right cervical internal carotid artery stent demonstrates mild-to-moderate in stent stenosis. Follow-up catheter angiogram may be helpful for further evaluation. 3.  Multifocal areas of moderate to severe stenosis in the right middle cerebral artery branches. 4.  Multifocal severe stenoses in the right anterior cerebral artery A2 and A3 segments. 5.  Focal severe stenosis in the left subclavian artery secondary to noncalcified atherosclerotic plaque at the level of the left vertebral artery origin. ASSESSMENT:     Principal Problem:    Altered mental status  Resolved Problems:    * No resolved hospital problems. *      PLAN:     ICU PROPHYLAXIS:  Stress ulcer:  [] PPI Agent  [] [id number] [] Sucralfate  [] Other:  VTE:   [] Enoxaparin  [x] Unfract. Heparin Subcut  [] EPC Cuffs    NUTRITION:  [x] NPO  [] Tube Feeding (Specify: ) [] TPN  [] PO    CONSULTATION NEEDED:  [] No   [] Yes     HOME MEDICATIONS RECONCILED: [] No  [] Yes    FAMILY UPDATED:    [] No   [] Yes     ADDITIONAL PLAN:    Acute metabolic encephalopathy - improved  DKA  ALMITA  Severe stenosis of Left subclavian artery  Hypertensive emergency  Mild to moderate in stent stenosis of R ICA  PRES syndrome  STEMI - ED discussed with interventional cardiology, recommended to trend trops and start heparin gtt  Leukocytosis  Anion gap metabolic acidosis  Prolonged qtc [address]  - consult vascular  - start DKA protocol, keep NPO  - continue heparin gtt  - send sepsis work up  - continue cardene gtt, started lopressor 2.5 q6h for tachycardia  - start folic acid, W74, thiamine  - continue vanc and zosyn  - neurology on board; Get MRI  - received zosyn and vancomycin in ED  - monitor off antibiotics. Procal not elevated.  Check white count tomorrow, if needed can start rocephin  - qtc prolonged, avoid qtc prolonging medications  - monitor for urinary retention    Dorris Bence, MD, MD  ICU resident   [facility]  1/7/2023 9:36 PM    The critical care team assigned to the patient will be following up the patient in the intensive care unit. I have discussed the current plan with the critical care attending. The above mentioned assessment and plan will be reviewed again in detail by the critical care attending at bedside, and can be further changed or modified accordingly by the attending physician.

## 2023-01-08 NOTE — CARE COORDINATION
Case Management Assessment  Initial Evaluation    Date/Time of Evaluation: 1/8/2023 5:47 PM  Assessment Completed by: Simona Curry RN    If patient is discharged prior to next notation, then this note serves as note for discharge by case management. Patient Name: Patricia Interiano                   YOB: 1965  Diagnosis: Altered mental status [R41.82]  DKA, type 2, not at goal [facility]) [E11.10]  Altered mental status, unspecified altered mental status type [R41.82]                   Date / Time: 1/7/2023  4:22 PM    Patient Admission Status: Inpatient   Readmission Risk (Low < 19, Mod (19-27), High > 27): Readmission Risk Score: 13    Current PCP: Luba Metcalf MD  PCP verified by CM? (P) Yes    Chart Reviewed: Yes      History Provided by: (P) Child/Family  Patient Orientation: (P) Unable to Assess    Patient Cognition: (P) Alert    Hospitalization in the last 30 days (Readmission):  No    If yes, Readmission Assessment in CM Navigator will be completed. Advance Directives:      Code Status: Full Code   Patient's Primary Decision Maker is:        Discharge Planning:    Patient lives with: (P) Alone Type of Home: (P) House  Primary Care Giver: (P) Self  Patient Support Systems include: (P) Family Members   Current Financial resources: (P) Medicaid  Current community resources:    Current services prior to admission: (P) Durable Medical Equipment            Current DME: (P) Walker            Type of Home Care services:       ADLS  Prior functional level: (P) Independent in ADLs/IADLs  Current functional level: (P) Assistance with the following:    PT AM-PAC:   /24  OT AM-PAC:   /24    Family can provide assistance at DC: (P) No  Would you like Case Management to discuss the discharge plan with any other family members/significant others, and if so, who?     Plans to Return to Present Housing: (P) Unknown at present  Other Identified Issues/Barriers to RETURNING to current housing: confusion, ezequiel  Potential Assistance needed at discharge: (P) Dao Biggs            Potential DME:    Patient expects to discharge to: (P) [address] for transportation at discharge:      Financial    Payor: Bernarda Braxton / Plan: Bernarda Braxton / Product Type: *No Product type* /     Does insurance require precert for SNF: Yes    Potential assistance Purchasing Medications:    Meds-to-Beds request: Yes      [address] [place] [address] [address] [address]  Phone: [phone] Fax: [phone]      Notes:    Factors facilitating achievement of predicted outcomes: Family support    Barriers to discharge: Medical complications    Additional Case Management Notes: patient's sonThi, thinks patient will need to go to SNF at discharge. List given from [facility] website and list with CMS ratings    The Plan for Transition of Care is related to the following treatment goals of Altered mental status [R41.82]  DKA, type 2, not at goal [facility]) [E11.10]  Altered mental status, unspecified altered mental status type [I48.30]    IF APPLICABLE: The Patient and/or patient representative Michaelle Sheehan and his family were provided with a choice of provider and agrees with the discharge plan. Freedom of choice list with basic dialogue that supports the patient's individualized plan of care/goals and shares the quality data associated with the providers was provided to: (P) Patient Representative   Patient Representative Name: (P) Thi pereyra     The Patient and/or Patient Representative Agree with the Discharge Plan?  (P) Yes    Simona Curry RN  Case Management Department  Ph: [phone] Fax: [phone]

## 2023-01-08 NOTE — CONSULTS
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 Patient presented to the ED with altered mental status with last known well two days prior. He was found to have 5 bottles of red bull at his home according to his son. Patient was also found to be hypertensive with -250, tachycardic with HR 110s and disheveled. In the ED, EKG showed ST elevation in V1-V3 with mild elevation in V4, glucose was 620 with beta hydroxy butyrate 2.43 and anion gap 26. Patient was admitted to ICU for medical care, now patient is awake and alert but with some slurred speech    Allergies  has No Known Allergies. Medications  Prior to Admission medications    Medication Sig Start Date End Date Taking? Authorizing Provider   metoprolol tartrate (LOPRESSOR) 25 MG tablet Take 2 tablets by mouth 2 times daily 8/24/22   Bayron Chan MD   clopidogrel (PLAVIX) 75 MG tablet TAKE 1 TABLET BY MOUTH DAILY 2/17/22   Tamika Alejandre MD   gabapentin (NEURONTIN) 300 MG capsule TAKE 1 CAPSULE BY MOUTH EVERY 8 HOURS 2/17/22 3/19/22  Tamika Alejandre MD   metFORMIN (GLUCOPHAGE) 1000 MG tablet Take 1 tablet by mouth 2 times daily (with meals) 1/13/22 3/14/22  Tamika Alejandre MD   insulin lispro, 1 Unit Dial, (HUMALOG KWIKPEN) 100 UNIT/ML SOPN INJECT SUBCUTANEOUSLY BEFORE MEALS AS DIRECTED PER SLIDING SCALE: SEE ATTACHED FOR SLIDING SCALE DIRECTIONS: Hold all insulin and notify Dr immediately if BG <60.  -200: 2 UNITS; -250: 4UNITS; 251-300 6 UNITS; 301-350: 8 UNITS  Patient not taking: Reported on 1/13/2022 1/5/22   Tamika Alejandre MD lisinopril-hydroCHLOROthiazide (PRINZIDE;ZESTORETIC) 20-25 MG per tablet Take 2 tablets by mouth daily .   Take 1 pill if blood pressure is less than 130/80 12/23/21   Vamshi Barlow MD   insulin glargine (LANTUS SOLOSTAR) 100 UNIT/ML injection pen Inject 24 Units into the skin nightly 11/9/21 1/13/22  Emory Jordan MD   Insulin Syringe-Needle U-100 30G X 5/16\" 1 ML MISC 1 each by Does not apply route daily 11/9/21   Emory Jordan MD   atorvastatin (LIPITOR) 40 MG tablet Take 1 tablet by mouth nightly 10/19/21   Jimbo Espinoza MD   metoprolol tartrate (LOPRESSOR) 50 MG tablet Take 1 tablet by mouth 2 times daily 10/19/21   Jimbo Espinoza MD   blood glucose test strips (ONETOUCH VERIO) strip TEST 2 TIMES A DAY & AS NEEDED FOR SYMPTOMS OF IRREGULAR BLOOD GLUCOSE. 5/28/21   Walt Brewster MD   Alcohol Swabs (B-D SINGLE USE SWABS REGULAR) PADS USE AS DIRECTED WITH TESTING SUPPLIES 5/28/21   Walt Brewster MD   Lancets ([address]) [address] [facility] USE AS DIRECTED TO TEST BLOOD SUGAR 5/28/21   Walt Brewster MD   sildenafil (VIAGRA) 100 MG tablet Take 1 tablet by mouth as needed for Erectile Dysfunction 1/7/21   Walt Brewster MD   Blood Glucose Monitoring Suppl (ONE TOUCH ULTRA 2) w/Device KIT U UTD ONCE D 9/27/19   Historical Provider, MD   acetaminophen (TYLENOL) 500 MG tablet Take 2 tablets by mouth every 8 hours as needed for Pain 9/30/19   Clementina Shahid DO   Handicap Placard [address] [facility] by Does not apply route For 5 years 9/27/19   Walt Brewster MD   Blood Glucose Monitoring Suppl (ACURA BLOOD GLUCOSE METER) w/Device KIT 1 kit by Does not apply route once for 1 dose 9/27/19 9/27/19  Walt Brewster MD   Insulin Pen Needle 32G X 6 MM MISC 1 each by Does not apply route daily 9/27/19   Walt Brewster MD   furosemide (LASIX) 20 MG tablet Take 1 tablet by mouth daily  Patient not taking: Reported on 11/16/2021 7/4/19   Grant Zaragoza, APRN - CNP   aspirin 81 MG chewable tablet Take 1 tablet by mouth daily 5/10/19   [employer], MD   Multiple Vitamins-Minerals (THERAPEUTIC MULTIVITAMIN-MINERALS) tablet Take 1 tablet by mouth daily (with breakfast) 5/10/19   Doc De Leon MD    Scheduled Meds:   insulin lispro  0-16 Units SubCUTAneous Q4H    famotidine (PEPCID) injection  20 mg IntraVENous BID    potassium chloride  10 mEq IntraVENous Q1H    [START ON 1/9/2023] cefTRIAXone (ROCEPHIN) IV  1,000 mg IntraVENous Q24H    atorvastatin  40 mg Oral Nightly    aspirin  81 mg Oral Daily    therapeutic multivitamin-minerals  1 tablet Oral Daily with breakfast    sodium chloride  15 mL/kg IntraVENous Once    sodium chloride flush  5-40 mL IntraVENous 2 times per day    thiamine and folic acid IVPB   IntraVENous Daily    metoprolol  2.5 mg IntraVENous Q6H     Continuous Infusions:   dexmedetomidine 0.2 mcg/kg/hr (01/08/23 1015)    dextrose      dextrose 5% and 0.45% NaCl with KCl 20 mEq 75 mL/hr at 01/08/23 1115    heparin (PORCINE) Infusion 14 Units/kg/hr (01/08/23 0930)    sodium chloride 250 mL/hr at 01/07/23 2358    dextrose 5% and 0.45% NaCl with KCl 20 mEq 150 mL/hr at 01/08/23 1011    sodium chloride       PRN Meds:.glucose, dextrose bolus **OR** dextrose bolus, glucagon (rDNA), dextrose, heparin (porcine), heparin (porcine), dextrose bolus **OR** dextrose bolus, potassium chloride, magnesium sulfate, sodium phosphate IVPB **OR** sodium phosphate IVPB **OR** sodium phosphate IVPB, dextrose 5% and 0.45% NaCl with KCl 20 mEq, sodium chloride flush, sodium chloride, ondansetron **OR** ondansetron, polyethylene glycol, acetaminophen **OR** acetaminophen  Past Medical History   has a past medical history of CAD (coronary artery disease), Cardiac arrest ([address] Diabetes mellitus ([address] Hyperlipidemia, Hypertension, Osteolysis, PEA (Pulseless electrical activity) ([address] PVD (peripheral vascular disease) ([address] and Wound, open. Past Surgical History   has a past surgical history that includes Femur fracture surgery (Right, 2000);  Cardiac catheterization (04/07/2019); Femur Surgery; Tonsillectomy (1974); Coronary artery bypass graft (04/07/2019); Hardware Removal (Right, 04/07/2019); Tibia fracture surgery (Right, 2000); Coronary artery bypass graft (N/A, 4/7/2019); RECONSTRUCTIVE REPAIR STERNAL (N/A, 4/8/2019); Hardware Removal (Right, 04/22/2019); REMOVE HARDWARE FEMUR (N/A, 4/22/2019); Carotid stent placement (07/03/2019); Leg amputation below knee (Right, 8/25/2019); Leg amputation below knee (Right, 8/27/2019); above knee amputation (Right, 09/28/2019); and AMPUTATION ABOVE KNEE (Right, 9/28/2019). Social History   reports that he has been smoking cigarettes. He started smoking about 2 years ago. He has a 20.00 pack-year smoking history. He has never used smokeless tobacco.   reports no history of alcohol use. reports no history of drug use. Family History  family history includes Diabetes in his brother, father, and mother; Heart Disease in his brother, father, and mother; High Blood Pressure in his brother, father, and sister; Stroke in his father and mother. Review of Systems:  CONSTITUTIONAL:  negative for fevers, chills, fatigue and malaise    EYES:  negative for double vision, blurred vision and photophobia     HEENT:  negative for tinnitus, epistaxis and sore throat    RESPIRATORY:  negative for cough, shortness of breath, wheezing    CARDIOVASCULAR:  negative for chest pain, palpitations, syncope, edema    GASTROINTESTINAL:  negative for nausea, vomiting    GENITOURINARY:  negative for incontinence    MUSCULOSKELETAL:  negative for neck or back pain    NEUROLOGICAL:  Negative for weakness and tingling  negative for headaches and dizziness    PSYCHIATRIC:  negative for anxiety      Review of systems otherwise negative.       OBJECTIVE:     Vitals:    01/08/23 1000   BP: 139/65   Pulse: 74   Resp: 17   Temp: 98.5 °F (36.9 °C)   SpO2: 96%        General:  Gen: normal habitus, NAD  HEENT: NCAT, mucosa moist  Cvs: RRR, S1 S2 normal  Resp: symmetric unlabored breathing  Abd: s/nd/nt  Ext: no edema  Skin: no lesions seen, warm and dry    Neuro:  Gen: awake and alert, oriented x2. Not oriented time   Lang/speech: mild aphasia and dysarthria. Follows commands. CN: PERRL, EOMI, VFF, V1-3 intact, mild right face droop, hearing intact, shoulder shrug symmetric, tongue midline  Motor: grossly 5/5 UE and LE on the left, right arm 4/5, right leg amputated above knee 1/5  Sense: decrease on the left  Coord: deferred  DTR: deferred  Gait: deferred    NIH Stroke Scale:   1a  Level of consciousness: 0 - alert; keenly responsive   1b. LOC questions:  0 - answers both questions correctly   1c. LOC commands: 0 - performs both tasks correctly   2. Best Gaze: 0 - normal   3. Visual: 0 - no visual loss   4. Facial Palsy: 1 - minor paralysis (flattened nasolabial fold, asymmetric on smiling)   5a. Motor left arm: 0 - no drift, limb holds 90 (or 45) degrees for full 10 seconds   5b. Motor right arm: 2 - some effort against gravity, limb cannot get to or maintain (if cued) 90 (or 45) degrees, drifts down to bed, but has some effort against gravity    6a. Motor left le - no drift; leg holds 30 degree position for full 5 seconds   6b  Motor right leg:  3 - no effort against gravity; leg falls to bed immediately   7. Limb Ataxia: 0 - absent   8. Sensory: 1 - mild to moderate sensory loss; patient feels pinprick is less sharp or is dull on the affected side; there is a loss of superficial pain with pinprick but patient is aware of being touched    9. Best Language:  1 - mild to moderate aphasia; some obvious loss of fluency or facility of comprehension without significant limitation on ideas expressed or form of expression. Reduction of speech and/or comprehension, however, makes conversation about provided materials difficult or impossible.   For example, in conversation about provided materials, examiner can identify picture or naming card content from patient's response. 10. Dysarthria: 1 - mild to moderate, patient slurs at least some words and at worst, can be understood with some difficulty   11. Extinction and Inattention: 0 - no abnormality         Total:   9     MRS: 5      LABS:   Reviewed. Lab Results   Component Value Date    HGB 15.3 01/08/2023    WBC 22.6 (H) 01/08/2023     01/08/2023    NA PENDING 01/08/2023    BUN 17 01/08/2023    CREATININE 0.60 (L) 01/08/2023    AST 16 01/07/2023    ALT 18 01/07/2023    MG 2.3 01/08/2023    APTT 78.1 (H) 01/08/2023    INR 1.1 08/23/2022      No results found for: COVID19    RADIOLOGY:   Images were personally reviewed including:    Cerebral angiogram 7/3/2019:  1. Right proximal immediately after the origin cervical ICA atherosclerotic changes resulting into a focal area of stenosis measuring approximately 65% per NASCET criteria. 2.  The above mentioned stenosis was treated with post balloon angioplasty and stenting using a 7-10x 30 mm Acculink stent with no significant residual stenosis. 3.  Right common femoral artery severe stenosis with good collaterals. CTA head neck 1/7/23: R ICA Acculink stent patent and L ICA chronic occlusion seen before 2019. Dominant R VA and chronically occluded L VA      MRI brain w/o contrast 1/8/23: punctate right temporal periventricular stroke. 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 Patient presented to the ED with AMS and found to have glucose of 620 and possible sepsis. Endovascular consulted for chronic L ICA and L VA occlusion and hx of R ICA stent in 2019.     PLAN:     - no intervention, CTA is stable and L ICA and L VA occlusion are chronic and known before 2019  - R ICA stent is patent  - patient has a punctate right temporal periventricular stroke, he is currently already on aspirin, recommend continue it  - patient is on heparin for ACS, cardiology and ICU managing it    - PT OT and SLP    Case discussed with Dr. Pedrito Doss attending.     Megan Gallagher MD  Stroke, [facility]  [id number]  Electronically signed 1/8/2023 at 12:11 PM

## 2023-01-08 NOTE — ED PROVIDER NOTES
[facility]. [address]- [facility]  Emergency Department Encounter  Emergency Medicine Resident     Pt Name:Dash Hu  MRN: [id number]  [place] 1965  Date of evaluation: 1/8/23  PCP:  Chico Reid MD      [address] [place] [address]       Chief Complaint   Patient presents with    Altered Mental Status       HISTORY OF PRESENT ILLNESS  (Location/Symptom, Timing/Onset, Context/Setting, Quality, Duration, Modifying Factors, Severity.)      Marce Proctor is a 61 y.o. male who presents with altered mental status. Last known well was 2 days ago on Thursday. Patient lives alone, is able to complete all his ADLs on his own, was otherwise in fairly good health. Patient was found on the ground, somewhat unresponsive and unable to speak. He has been awake for EMS but unable to provide any history. Speech is garbled. They noted blood glucose of 250. Somewhat tachycardic heart rate and hypertension. On arrival, patient is awake and alert but cannot speak or provide any history. He is moving all extremities. Does not appear to be in pain. With exception of speech changes no other acute deficits. Review of EMR demonstrates multiple comorbidities including CAD, with history of CABG, DM, hypertension, hyperlipidemia, PVD    PAST MEDICAL / SURGICAL / SOCIAL / FAMILY HISTORY      has a past medical history of CAD (coronary artery disease), Cardiac arrest ([facility] [address] Diabetes mellitus ([facility] [address] Hyperlipidemia, Hypertension, Osteolysis, PEA (Pulseless electrical activity) ([address] PVD (peripheral vascular disease) ([facility] [address] and Wound, open. has a past surgical history that includes Femur fracture surgery (Right, 2000); Cardiac catheterization (04/07/2019); Femur Surgery; Tonsillectomy (1974); Coronary artery bypass graft (04/07/2019); Hardware Removal (Right, 04/07/2019); Tibia fracture surgery (Right, 2000); Coronary artery bypass graft (N/A, 4/7/2019); RECONSTRUCTIVE REPAIR STERNAL (N/A, 4/8/2019);  Hardware Removal (Right, 04/22/2019); REMOVE HARDWARE FEMUR (N/A, 4/22/2019); Carotid stent placement (07/03/2019); Leg amputation below knee (Right, 8/25/2019); Leg amputation below knee (Right, 8/27/2019); above knee amputation (Right, 09/28/2019); and AMPUTATION ABOVE KNEE (Right, 9/28/2019). Social History     Socioeconomic History    Marital status:      Spouse name: Not on file    Number of children: Not on file    Years of education: Not on file    Highest education level: Not on file   Occupational History    Not on file   Tobacco Use    Smoking status: Every Day     Packs/day: 1.00     Years: 20.00     Pack years: 20.00     Types: Cigarettes     Start date: 9/1/2020    Smokeless tobacco: Never    Tobacco comments:     started to smoke again 09/01/2020 here or there out of boredom    Vaping Use    Vaping Use: Never used   Substance and Sexual Activity    Alcohol use: No     Comment: RECOVERING X 20 YEARS    Drug use: No    Sexual activity: Not Currently   Other Topics Concern    Not on file   Social History Narrative    ** Merged History Encounter **          Social Determinants of Health     Financial Resource Strain: Low Risk     Difficulty of Paying Living Expenses: Not very hard   Food Insecurity: No Food Insecurity    Worried About Running Out of Food in the Last Year: Never true    Ran Out of Food in the Last Year: Never true   Transportation Needs: Not on file   Physical Activity: Not on file   Stress: Not on file   Social Connections: Not on file   Intimate Partner Violence: Not on file   Housing Stability: Not on file       Family History   Problem Relation Age of Onset    Heart Disease Mother         CAD WITH STENT    Diabetes Mother     Stroke Mother     Diabetes Father     Stroke Father     Heart Disease Father         CAD    High Blood Pressure Father     High Blood Pressure Sister     Diabetes Brother     Heart Disease Brother     High Blood Pressure Brother        Allergies:  Patient has no known allergies.     Home Medications:  Prior to Admission medications    Medication Sig Start Date End Date Taking? Authorizing Provider   metoprolol tartrate (LOPRESSOR) 25 MG tablet Take 2 tablets by mouth 2 times daily 8/24/22   Mary Jo Florez MD   clopidogrel (PLAVIX) 75 MG tablet TAKE 1 TABLET BY MOUTH DAILY 2/17/22   Benigno Can MD   gabapentin (NEURONTIN) 300 MG capsule TAKE 1 CAPSULE BY MOUTH EVERY 8 HOURS 2/17/22 3/19/22  Benigno Can MD   metFORMIN (GLUCOPHAGE) 1000 MG tablet Take 1 tablet by mouth 2 times daily (with meals) 1/13/22 3/14/22  Benigno Can MD   insulin lispro, 1 Unit Dial, (HUMALOG KWIKPEN) 100 UNIT/ML SOPN INJECT SUBCUTANEOUSLY BEFORE MEALS AS DIRECTED PER SLIDING SCALE: SEE ATTACHED FOR SLIDING SCALE DIRECTIONS: Hold all insulin and notify Dr immediately if BG <60. -200: 2 UNITS; -250: 4UNITS; 251-300 6 UNITS; 301-350: 8 UNITS  Patient not taking: Reported on 1/13/2022 1/5/22   Benigno Can MD   lisinopril-hydroCHLOROthiazide (PRINZIDE;ZESTORETIC) 20-25 MG per tablet Take 2 tablets by mouth daily .   Take 1 pill if blood pressure is less than 130/80 12/23/21   Benigno Can MD   insulin glargine (LANTUS SOLOSTAR) 100 UNIT/ML injection pen Inject 24 Units into the skin nightly 11/9/21 1/13/22  Justo Hunt MD   Insulin Syringe-Needle U-100 30G X 5/16\" 1 ML MISC 1 each by Does not apply route daily 11/9/21   Justo Hunt MD   atorvastatin (LIPITOR) 40 MG tablet Take 1 tablet by mouth nightly 10/19/21   Jacque Foote MD   metoprolol tartrate (LOPRESSOR) 50 MG tablet Take 1 tablet by mouth 2 times daily 10/19/21   Jacque Foote MD   blood glucose test strips (ONETOUCH VERIO) strip TEST 2 TIMES A DAY & AS NEEDED FOR SYMPTOMS OF IRREGULAR BLOOD GLUCOSE. 5/28/21   Brandt Decker MD   Alcohol Swabs (B-D SINGLE USE SWABS REGULAR) PADS USE AS DIRECTED WITH TESTING SUPPLIES 5/28/21   Brandt Decker MD   Lancets (ONETOUCH DELICA PLUS [id number]) [facility] USE AS DIRECTED TO TEST BLOOD SUGAR 5/28/21   Brandt Decker MD sildenafil (VIAGRA) 100 MG tablet Take 1 tablet by mouth as needed for Erectile Dysfunction 1/7/21   Aj Molina MD   Blood Glucose Monitoring Suppl (ONE TOUCH ULTRA 2) w/Device KIT U UTD ONCE D 9/27/19   Historical Provider, MD   acetaminophen (TYLENOL) 500 MG tablet Take 2 tablets by mouth every 8 hours as needed for Pain 9/30/19   Rajendra Deshpande,    Handicap Placard MISC by Does not apply route For 5 years 9/27/19   Aj Molina MD   Blood Glucose Monitoring Suppl [facility] BLOOD GLUCOSE METER) w/Device KIT 1 kit by Does not apply route once for 1 dose 9/27/19 9/27/19  Aj Molina MD   Insulin Pen Needle 32G X 6 MM MISC 1 each by Does not apply route daily 9/27/19   Aj Molina MD   furosemide (LASIX) 20 MG tablet Take 1 tablet by mouth daily  Patient not taking: Reported on 11/16/2021 7/4/19   ISAAC Guadalupe - CNP   aspirin 81 MG chewable tablet Take 1 tablet by mouth daily 5/10/19   Mar Sen MD   Multiple Vitamins-Minerals (THERAPEUTIC MULTIVITAMIN-MINERALS) tablet Take 1 tablet by mouth daily (with breakfast) 5/10/19   Mar Sen MD         REVIEW OF SYSTEMS       Review of Systems   Unable to perform ROS: Patient nonverbal     PHYSICAL EXAM      INITIAL VITALS:   BP (!) 168/66   Pulse (!) 115   Temp 98.7 °F (37.1 °C) (Oral)   Resp 21   Ht 5' 6\" (1.676 m)   Wt 133 lb 6.1 oz (60.5 kg)   SpO2 92%   BMI 21.53 kg/m²     Physical Exam  Constitutional:       Appearance: Normal appearance. He is well-developed. He is not ill-appearing, toxic-appearing or diaphoretic. Comments: Appearance disheveled, unkept. Otherwise in no acute distress. He is awake and alert but unable to speak with exception of some garbled sounds   HENT:      Head: Normocephalic and atraumatic. Mouth/Throat:      Mouth: Mucous membranes are moist.      Pharynx: Oropharynx is clear. Eyes:      Extraocular Movements: Extraocular movements intact.    Cardiovascular:      Rate and Rhythm: Normal rate and regular rhythm. Heart sounds: Normal heart sounds. No murmur heard. Pulmonary:      Effort: Pulmonary effort is normal. No respiratory distress. Breath sounds: Normal breath sounds. No wheezing or rhonchi. Abdominal:      Palpations: Abdomen is soft. Tenderness: There is no abdominal tenderness. Musculoskeletal:      Cervical back: Normal range of motion and neck supple. Comments: Right AKA   Skin:     General: Skin is warm and dry. Neurological:      General: No focal deficit present. Mental Status: He is alert and oriented to person, place, and time. Comments: Persistent chewing motion with his mouth. This is at baseline according to family. No obvious fasciculations of the tongue. Moving all extremities but some weakness on the right upper extremity         DDX/DIAGNOSTIC RESULTS / EMERGENCY DEPARTMENT COURSE / [facility]     Medical Decision Making  69-year-old male presenting with altered mental status with last known well 2 days ago. Multiple risk factors for stroke the patient is outside of stroke alert window. On arrival, patient's blood pressure significantly elevated in the 850H to 757M systolic. Heart rate in the 130s to 140s. Initial EKG concerning for ST elevation in the anterior septal leads. Exam overall is nonfocal.  He is disheveled, unable to speak. Will obtain broad work-up including stroke work-up, cardiac work-up, sepsis work-up. Given EKG changes, will speak with cardiology. Will eventually speak with neurology team as well. Patient does have a history of diabetes, blood glucose was reported at 250. We will follow-up labs. Anticipate empiric treatment for sepsis etiologies    Amount and/or Complexity of Data Reviewed  Labs: ordered. Decision-making details documented in ED Course. Radiology: ordered and independent interpretation performed. Decision-making details documented in ED Course. Details: Evaluated CTA of the head and neck. Significantly asymmetric subclavian arteries, right much greater than left. Left subclavian stenosis noted  ECG/medicine tests: ordered and independent interpretation performed. Details: EKG demonstrating ST segment elevation in the anteroseptal leads, changes consistent with LVH as well. ST segment depression into with T wave inversions in 3, avf, v6.  Discussion of management or test interpretation with external provider(s): Case was discussed with critical care and family medicine for admission. EKG changes and altered mental status discussed with cardiology who recommends anticoagulation when cleared to do so. They will follow-up. Neurology and neuro endovascular consulted for altered mental status and arterial stenoses on CTA of head and neck. They may consider EEG and MRI in the morning. Risk  Prescription drug management. Decision regarding hospitalization. EKG  EKG menstruation normal sinus rhythm with tachycardic rate, normal axis, ST elevations in V1, V2, V3, some depression in 2, T wave inversion in aVF. Concerns for ischemia in the anterior septal region versus LVH. prolonged QTC. All EKG's are interpreted by the Emergency Department Physician who either signs or Co-signs this chart in the absence of a cardiologist.    EMERGENCY DEPARTMENT COURSE:    Initial work-up notable for hyperglycemia with glucose at 620, beta hydroxybutyrate added on. Awaiting urinalysis collection    Leukocytosis noted, unknown source of possible infection but empiric antibiotics initiated with vancomycin and Zosyn    Patient was given labetalol 10 mg initially, blood pressure slightly improved but remains high. With concerns of stroke versus intracranial hemorrhage, will likely begin antihypertensive gtt. ED Course as of 01/08/23 0051   Sat Jan 07, 2023   1753 Updated family on work-up so far. He does appear to have possible infection of unknown etiology.   Still awaiting CT scans of the head to rule out stroke. They report prominent family history of strokes. Patient reportedly had an appointment with his doctor recently and was in good health by his report. History of cardiac disease, they believe he may be on some blood thinners. Patient is otherwise able to converse and ambulate normally at baseline with his prosthesis. [JS]   1843 Troponins flat, 30, 25. We will continue to monitor. Cardene initiated for blood pressure control. [JS]   1903 Lactic Acid, Sepsis, Whole Blood(!): 2.3  Downtrending   [JS]   1917 CT CERVICAL SPINE WO CONTRAST [JS]   2009 CTA HEAD NECK W CONTRAST  IMPRESSION:  1. 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 2.  Patent right cervical internal carotid artery stent demonstrates  mild-to-moderate in stent stenosis. Follow-up catheter angiogram may be  helpful for further evaluation. 3.  Multifocal areas of moderate to severe stenosis in the right middle  cerebral artery branches. 4.  Multifocal severe stenoses in the right anterior cerebral artery A2 and  A3 segments. 5.  Focal severe stenosis in the left subclavian artery secondary to  noncalcified atherosclerotic plaque at the level of the left vertebral artery  origin. [JS]   2116 Family medicine called back, requesting critical care admission. Discussed the case with critical care and they will come see the patient. [JS]      ED Course User Index  [JS] Lena Mayes DO     Patient was discussed with multiple services including family medicine, critical care. Neurology and neuro endovascular were consulted for CT findings, multiple areas of stenosis. When reevaluated the patient, and became evident that patient has significantly different blood pressures in the right versus left arm. Right arm remains hypertensive in the 170s, left arm is significantly hypertensive, consistent with subclavian stenosis on CTA.   Patient could benefit from vascular surgery consult inpatient. Heparin was started for ST segment elevations although these may be related to LVH. Troponins have been flat. Heparin also may be indicated for multiple arterial stenoses noted on imaging. Patient otherwise remained stable here in the ED, although continued to intermittently need Cardene. PROCEDURES:  none    CONSULTS:  IP CONSULT TO CARDIOLOGY  IP CONSULT TO NEUROLOGY  IP CONSULT TO HOSPITALIST  IP CONSULT TO FAMILY MEDICINE  IP CONSULT TO ENDOVASCULAR NEUROSURGERY  IP CONSULT TO CRITICAL CARE  IP CONSULT TO DIABETES EDUCATOR    CRITICAL CARE:  There was significant risk of life threatening deterioration of patient's condition requiring my direct management. Critical care time 25 minutes, excluding any documented procedures. FINAL IMPRESSION      1. Altered mental status, unspecified altered mental status type    2. Hyperglycemia    3. Stenosis of right middle cerebral artery          DISPOSITION / PLAN     DISPOSITION Admitted 01/07/2023 10:06:04 PM      PATIENT REFERRED TO:  No follow-up provider specified.     DISCHARGE MEDICATIONS:  Current Discharge Medication List          Janie Shah DO  Emergency Medicine Resident    (Please note that portions of thisnote were completed with a voice recognition program.  Efforts were made to edit the dictations but occasionally words are mis-transcribed.)        Janie Shah DO  Resident  01/08/23 0550

## 2023-01-09 ENCOUNTER — APPOINTMENT (OUTPATIENT)
Dept: GENERAL RADIOLOGY | Age: 58
DRG: 405 | End: 2023-01-09
Payer: MEDICAID

## 2023-01-09 PROBLEM — I63.511 ACUTE ISCHEMIC RIGHT MCA STROKE (HCC): Status: ACTIVE | Noted: 2023-01-09

## 2023-01-09 PROBLEM — Z91.89 AT RISK OF SEIZURES: Status: ACTIVE | Noted: 2023-01-09

## 2023-01-09 PROBLEM — I66.01 STENOSIS OF RIGHT MIDDLE CEREBRAL ARTERY: Status: ACTIVE | Noted: 2023-01-09

## 2023-01-09 PROBLEM — I25.5 CARDIOMYOPATHY, ISCHEMIC: Status: ACTIVE | Noted: 2023-01-09

## 2023-01-09 LAB
ABSOLUTE EOS #: 0.09 K/UL (ref 0–0.44)
ABSOLUTE IMMATURE GRANULOCYTE: 0.08 K/UL (ref 0–0.3)
ABSOLUTE LYMPH #: 3.81 K/UL (ref 1.1–3.7)
ABSOLUTE MONO #: 1.21 K/UL (ref 0.1–1.2)
ANION GAP SERPL CALCULATED.3IONS-SCNC: 15 MMOL/L (ref 9–17)
ANION GAP SERPL CALCULATED.3IONS-SCNC: 17 MMOL/L (ref 9–17)
ANION GAP SERPL CALCULATED.3IONS-SCNC: 7 MMOL/L (ref 9–17)
BASOPHILS # BLD: 0 % (ref 0–2)
BASOPHILS ABSOLUTE: 0.05 K/UL (ref 0–0.2)
BUN BLDV-MCNC: 15 MG/DL (ref 6–20)
BUN BLDV-MCNC: 16 MG/DL (ref 6–20)
BUN BLDV-MCNC: 16 MG/DL (ref 6–20)
CALCIUM SERPL-MCNC: 7.9 MG/DL (ref 8.6–10.4)
CALCIUM SERPL-MCNC: 8.1 MG/DL (ref 8.6–10.4)
CALCIUM SERPL-MCNC: 8.4 MG/DL (ref 8.6–10.4)
CHLORIDE BLD-SCNC: 102 MMOL/L (ref 98–107)
CHLORIDE BLD-SCNC: 103 MMOL/L (ref 98–107)
CHLORIDE BLD-SCNC: 109 MMOL/L (ref 98–107)
CHOLESTEROL/HDL RATIO: 4.4
CHOLESTEROL: 114 MG/DL
CO2: 20 MMOL/L (ref 20–31)
CO2: 21 MMOL/L (ref 20–31)
CO2: 29 MMOL/L (ref 20–31)
CREAT SERPL-MCNC: 0.72 MG/DL (ref 0.7–1.2)
CREAT SERPL-MCNC: 0.76 MG/DL (ref 0.7–1.2)
CREAT SERPL-MCNC: 0.79 MG/DL (ref 0.7–1.2)
EKG ATRIAL RATE: 113 BPM
EKG ATRIAL RATE: 135 BPM
EKG ATRIAL RATE: 79 BPM
EKG P AXIS: 61 DEGREES
EKG P AXIS: 65 DEGREES
EKG P AXIS: 66 DEGREES
EKG P-R INTERVAL: 134 MS
EKG P-R INTERVAL: 154 MS
EKG P-R INTERVAL: 154 MS
EKG Q-T INTERVAL: 312 MS
EKG Q-T INTERVAL: 372 MS
EKG Q-T INTERVAL: 432 MS
EKG QRS DURATION: 88 MS
EKG QRS DURATION: 92 MS
EKG QRS DURATION: 96 MS
EKG QTC CALCULATION (BAZETT): 468 MS
EKG QTC CALCULATION (BAZETT): 495 MS
EKG QTC CALCULATION (BAZETT): 510 MS
EKG R AXIS: 34 DEGREES
EKG R AXIS: 42 DEGREES
EKG R AXIS: 45 DEGREES
EKG T AXIS: -164 DEGREES
EKG T AXIS: 180 DEGREES
EKG T AXIS: 2 DEGREES
EKG VENTRICULAR RATE: 113 BPM
EKG VENTRICULAR RATE: 135 BPM
EKG VENTRICULAR RATE: 79 BPM
EOSINOPHILS RELATIVE PERCENT: 1 % (ref 1–4)
ESTIMATED AVERAGE GLUCOSE: 318 MG/DL
GFR SERPL CREATININE-BSD FRML MDRD: >60 ML/MIN/1.73M2
GLUCOSE BLD-MCNC: 137 MG/DL (ref 75–110)
GLUCOSE BLD-MCNC: 165 MG/DL (ref 70–99)
GLUCOSE BLD-MCNC: 175 MG/DL (ref 75–110)
GLUCOSE BLD-MCNC: 176 MG/DL (ref 70–99)
GLUCOSE BLD-MCNC: 198 MG/DL (ref 75–110)
GLUCOSE BLD-MCNC: 207 MG/DL (ref 75–110)
GLUCOSE BLD-MCNC: 236 MG/DL (ref 75–110)
GLUCOSE BLD-MCNC: 279 MG/DL (ref 75–110)
GLUCOSE BLD-MCNC: 75 MG/DL (ref 70–99)
GLUCOSE BLD-MCNC: 99 MG/DL (ref 75–110)
GLUCOSE BLD-MCNC: >600 MG/DL (ref 75–110)
HBA1C MFR BLD: 12.7 % (ref 4–6)
HCT VFR BLD CALC: 40.2 % (ref 40.7–50.3)
HDLC SERPL-MCNC: 26 MG/DL
HEMOGLOBIN: 12.9 G/DL (ref 13–17)
IMMATURE GRANULOCYTES: 1 %
LDL CHOLESTEROL: 61 MG/DL (ref 0–130)
LV EF: 38 %
LVEF MODALITY: NORMAL
LYMPHOCYTES # BLD: 24 % (ref 24–43)
MAGNESIUM: 2 MG/DL (ref 1.6–2.6)
MAGNESIUM: 2.1 MG/DL (ref 1.6–2.6)
MAGNESIUM: 2.2 MG/DL (ref 1.6–2.6)
MCH RBC QN AUTO: 29.2 PG (ref 25.2–33.5)
MCHC RBC AUTO-ENTMCNC: 32.1 G/DL (ref 28.4–34.8)
MCV RBC AUTO: 91 FL (ref 82.6–102.9)
MONOCYTES # BLD: 8 % (ref 3–12)
NRBC AUTOMATED: 0 PER 100 WBC
PARTIAL THROMBOPLASTIN TIME: 44.1 SEC (ref 20.5–30.5)
PARTIAL THROMBOPLASTIN TIME: 54.5 SEC (ref 20.5–30.5)
PARTIAL THROMBOPLASTIN TIME: 59.5 SEC (ref 20.5–30.5)
PARTIAL THROMBOPLASTIN TIME: 73.4 SEC (ref 20.5–30.5)
PDW BLD-RTO: 13 % (ref 11.8–14.4)
PHOSPHORUS: 1.8 MG/DL (ref 2.5–4.5)
PHOSPHORUS: 2.4 MG/DL (ref 2.5–4.5)
PHOSPHORUS: 3.2 MG/DL (ref 2.5–4.5)
PLATELET # BLD: 223 K/UL (ref 138–453)
PMV BLD AUTO: 11.1 FL (ref 8.1–13.5)
POTASSIUM SERPL-SCNC: 4 MMOL/L (ref 3.7–5.3)
POTASSIUM SERPL-SCNC: 4 MMOL/L (ref 3.7–5.3)
POTASSIUM SERPL-SCNC: 4.4 MMOL/L (ref 3.7–5.3)
RBC # BLD: 4.42 M/UL (ref 4.21–5.77)
SEG NEUTROPHILS: 67 % (ref 36–65)
SEGMENTED NEUTROPHILS ABSOLUTE COUNT: 10.55 K/UL (ref 1.5–8.1)
SODIUM BLD-SCNC: 138 MMOL/L (ref 135–144)
SODIUM BLD-SCNC: 140 MMOL/L (ref 135–144)
SODIUM BLD-SCNC: 145 MMOL/L (ref 135–144)
TRIGL SERPL-MCNC: 135 MG/DL
TROPONIN, HIGH SENSITIVITY: 32 NG/L (ref 0–22)
WBC # BLD: 15.8 K/UL (ref 3.5–11.3)

## 2023-01-09 PROCEDURE — 2580000003 HC RX 258

## 2023-01-09 PROCEDURE — 95720 EEG PHY/QHP EA INCR W/VEEG: CPT | Performed by: PSYCHIATRY & NEUROLOGY

## 2023-01-09 PROCEDURE — 84100 ASSAY OF PHOSPHORUS: CPT

## 2023-01-09 PROCEDURE — 85025 COMPLETE CBC W/AUTO DIFF WBC: CPT

## 2023-01-09 PROCEDURE — C8929 TTE W OR WO FOL WCON,DOPPLER: HCPCS

## 2023-01-09 PROCEDURE — 51701 INSERT BLADDER CATHETER: CPT

## 2023-01-09 PROCEDURE — 83036 HEMOGLOBIN GLYCOSYLATED A1C: CPT

## 2023-01-09 PROCEDURE — 85730 THROMBOPLASTIN TIME PARTIAL: CPT

## 2023-01-09 PROCEDURE — 51798 US URINE CAPACITY MEASURE: CPT

## 2023-01-09 PROCEDURE — 80048 BASIC METABOLIC PNL TOTAL CA: CPT

## 2023-01-09 PROCEDURE — 6360000002 HC RX W HCPCS

## 2023-01-09 PROCEDURE — 6370000000 HC RX 637 (ALT 250 FOR IP)

## 2023-01-09 PROCEDURE — 2500000003 HC RX 250 WO HCPCS: Performed by: STUDENT IN AN ORGANIZED HEALTH CARE EDUCATION/TRAINING PROGRAM

## 2023-01-09 PROCEDURE — G0108 DIAB MANAGE TRN  PER INDIV: HCPCS

## 2023-01-09 PROCEDURE — 92611 MOTION FLUOROSCOPY/SWALLOW: CPT

## 2023-01-09 PROCEDURE — 2500000003 HC RX 250 WO HCPCS

## 2023-01-09 PROCEDURE — 93005 ELECTROCARDIOGRAM TRACING: CPT

## 2023-01-09 PROCEDURE — 82947 ASSAY GLUCOSE BLOOD QUANT: CPT

## 2023-01-09 PROCEDURE — 83735 ASSAY OF MAGNESIUM: CPT

## 2023-01-09 PROCEDURE — 2000000000 HC ICU R&B

## 2023-01-09 PROCEDURE — 36415 COLL VENOUS BLD VENIPUNCTURE: CPT

## 2023-01-09 PROCEDURE — 99232 SBSQ HOSP IP/OBS MODERATE 35: CPT | Performed by: PSYCHIATRY & NEUROLOGY

## 2023-01-09 PROCEDURE — 95714 VEEG EA 12-26 HR UNMNTR: CPT

## 2023-01-09 PROCEDURE — 6370000000 HC RX 637 (ALT 250 FOR IP): Performed by: STUDENT IN AN ORGANIZED HEALTH CARE EDUCATION/TRAINING PROGRAM

## 2023-01-09 PROCEDURE — 74230 X-RAY XM SWLNG FUNCJ C+: CPT

## 2023-01-09 PROCEDURE — 2580000003 HC RX 258: Performed by: STUDENT IN AN ORGANIZED HEALTH CARE EDUCATION/TRAINING PROGRAM

## 2023-01-09 PROCEDURE — 80061 LIPID PANEL: CPT

## 2023-01-09 PROCEDURE — 6360000002 HC RX W HCPCS: Performed by: STUDENT IN AN ORGANIZED HEALTH CARE EDUCATION/TRAINING PROGRAM

## 2023-01-09 PROCEDURE — 99291 CRITICAL CARE FIRST HOUR: CPT | Performed by: INTERNAL MEDICINE

## 2023-01-09 RX ORDER — 0.9 % SODIUM CHLORIDE 0.9 %
500 INTRAVENOUS SOLUTION INTRAVENOUS ONCE
Status: COMPLETED | OUTPATIENT
Start: 2023-01-09 | End: 2023-01-09

## 2023-01-09 RX ORDER — LEVETIRACETAM 10 MG/ML
1000 INJECTION INTRAVASCULAR
Status: COMPLETED | OUTPATIENT
Start: 2023-01-09 | End: 2023-01-09

## 2023-01-09 RX ORDER — DEXTROSE AND SODIUM CHLORIDE 5; .45 G/100ML; G/100ML
INJECTION, SOLUTION INTRAVENOUS CONTINUOUS
Status: DISCONTINUED | OUTPATIENT
Start: 2023-01-09 | End: 2023-01-10

## 2023-01-09 RX ORDER — DEXMEDETOMIDINE HYDROCHLORIDE 4 UG/ML
.1-1.5 INJECTION, SOLUTION INTRAVENOUS CONTINUOUS
Status: DISCONTINUED | OUTPATIENT
Start: 2023-01-09 | End: 2023-01-14

## 2023-01-09 RX ORDER — LEVETIRACETAM 500 MG/1
500 TABLET ORAL 2 TIMES DAILY
Status: DISCONTINUED | OUTPATIENT
Start: 2023-01-09 | End: 2023-01-09

## 2023-01-09 RX ORDER — LEVETIRACETAM 100 MG/ML
500 SOLUTION ORAL 2 TIMES DAILY
Status: DISCONTINUED | OUTPATIENT
Start: 2023-01-09 | End: 2023-01-10

## 2023-01-09 RX ORDER — LEVETIRACETAM 500 MG/5ML
2000 INJECTION, SOLUTION, CONCENTRATE INTRAVENOUS EVERY 12 HOURS
Status: DISCONTINUED | OUTPATIENT
Start: 2023-01-09 | End: 2023-01-09

## 2023-01-09 RX ORDER — HEPARIN SODIUM AND DEXTROSE 10000; 5 [USP'U]/100ML; G/100ML
5-30 INJECTION INTRAVENOUS CONTINUOUS
Status: DISCONTINUED | OUTPATIENT
Start: 2023-01-09 | End: 2023-01-22 | Stop reason: HOSPADM

## 2023-01-09 RX ORDER — INSULIN GLARGINE 100 [IU]/ML
5 INJECTION, SOLUTION SUBCUTANEOUS NIGHTLY
Status: DISCONTINUED | OUTPATIENT
Start: 2023-01-09 | End: 2023-01-10

## 2023-01-09 RX ADMIN — HEPARIN SODIUM 18 UNITS/KG/HR: 10000 INJECTION, SOLUTION INTRAVENOUS at 13:14

## 2023-01-09 RX ADMIN — ASPIRIN 81 MG: 81 TABLET, CHEWABLE ORAL at 10:51

## 2023-01-09 RX ADMIN — Medication 1 TABLET: at 10:51

## 2023-01-09 RX ADMIN — INSULIN LISPRO 4 UNITS: 100 INJECTION, SOLUTION INTRAVENOUS; SUBCUTANEOUS at 01:20

## 2023-01-09 RX ADMIN — LEVETIRACETAM 1000 MG: 10 INJECTION INTRAVENOUS at 13:21

## 2023-01-09 RX ADMIN — DESMOPRESSIN ACETATE 40 MG: 0.2 TABLET ORAL at 19:30

## 2023-01-09 RX ADMIN — POTASSIUM CHLORIDE 10 MEQ: 7.46 INJECTION, SOLUTION INTRAVENOUS at 00:26

## 2023-01-09 RX ADMIN — SODIUM CHLORIDE, PRESERVATIVE FREE 20 MG: 5 INJECTION INTRAVENOUS at 07:54

## 2023-01-09 RX ADMIN — DEXMEDETOMIDINE HYDROCHLORIDE 0.3 MCG/KG/HR: 4 INJECTION, SOLUTION INTRAVENOUS at 02:49

## 2023-01-09 RX ADMIN — INSULIN LISPRO 4 UNITS: 100 INJECTION, SOLUTION INTRAVENOUS; SUBCUTANEOUS at 23:26

## 2023-01-09 RX ADMIN — POTASSIUM PHOSPHATE, MONOBASIC AND POTASSIUM PHOSPHATE, DIBASIC 15 MMOL: 224; 236 INJECTION, SOLUTION, CONCENTRATE INTRAVENOUS at 01:38

## 2023-01-09 RX ADMIN — POTASSIUM CHLORIDE, DEXTROSE MONOHYDRATE AND SODIUM CHLORIDE: 150; 5; 450 INJECTION, SOLUTION INTRAVENOUS at 05:21

## 2023-01-09 RX ADMIN — HEPARIN SODIUM 16 UNITS/KG/HR: 10000 INJECTION, SOLUTION INTRAVENOUS at 21:43

## 2023-01-09 RX ADMIN — INSULIN LISPRO 8 UNITS: 100 INJECTION, SOLUTION INTRAVENOUS; SUBCUTANEOUS at 03:24

## 2023-01-09 RX ADMIN — CEFTRIAXONE SODIUM 1000 MG: 10 INJECTION, POWDER, FOR SOLUTION INTRAVENOUS at 07:54

## 2023-01-09 RX ADMIN — SODIUM CHLORIDE 500 ML: 9 INJECTION, SOLUTION INTRAVENOUS at 09:36

## 2023-01-09 RX ADMIN — LEVETIRACETAM 1000 MG: 10 INJECTION INTRAVENOUS at 13:45

## 2023-01-09 RX ADMIN — INSULIN GLARGINE 5 UNITS: 100 INJECTION, SOLUTION SUBCUTANEOUS at 20:23

## 2023-01-09 RX ADMIN — DEXTROSE AND SODIUM CHLORIDE: 5; 450 INJECTION, SOLUTION INTRAVENOUS at 09:35

## 2023-01-09 RX ADMIN — SODIUM CHLORIDE, PRESERVATIVE FREE 20 MG: 5 INJECTION INTRAVENOUS at 19:30

## 2023-01-09 RX ADMIN — THIAMINE HYDROCHLORIDE: 100 INJECTION, SOLUTION INTRAMUSCULAR; INTRAVENOUS at 07:55

## 2023-01-09 RX ADMIN — SODIUM CHLORIDE, PRESERVATIVE FREE 10 ML: 5 INJECTION INTRAVENOUS at 07:55

## 2023-01-09 RX ADMIN — LEVETIRACETAM 500 MG: 500 SOLUTION ORAL at 23:12

## 2023-01-09 RX ADMIN — SODIUM CHLORIDE, PRESERVATIVE FREE 10 ML: 5 INJECTION INTRAVENOUS at 19:30

## 2023-01-09 RX ADMIN — HEPARIN SODIUM 18 UNITS/KG/HR: 10000 INJECTION, SOLUTION INTRAVENOUS at 06:04

## 2023-01-09 RX ADMIN — HEPARIN SODIUM 2000 UNITS: 1000 INJECTION INTRAVENOUS; SUBCUTANEOUS at 06:02

## 2023-01-09 NOTE — PROGRESS NOTES
Inpatient Diabetes  Education     Type and Reason for Visit: Patient Education -    uncontrolled diabetes / hyperglycemia  Writer rounded patient in room, not able to be assessed for diabetes self care needs at this time due to patient with poor mentation at this time - per bedside RN patient only intermittently oriented to self. Per chart review patient has history of diabetes type 2 , per A1C trend poor control. Lab Results   Component Value Date    LABA1C 14.0 12/23/2021    LABA1C 12.9 11/09/2021    LABA1C 12.0 12/10/2020     Home medication list includes Insulins - lantus and humalog and oral metformin. Last PCP visit with [facility] was 1 /13/ 2022 and not refils for home meds or insulin noted. No future PCP appt seen on appt desk. [facility] Diabetes Education team will follow up with patient education and support as patient's condition allows. Out patient diabetes education  contact number [email or web address] - [phone] to patient and placed on the discharge summary.     Emy Miranda RN

## 2023-01-09 NOTE — PROGRESS NOTES
INTENSIVE CARE UNIT  Resident Physician Progress Note    Patient - Celso [place]  Date of Admission -  1/7/2023  4:22 PM  Date of Evaluation -  1/9/2023  Room and Bed Number -  1264/9901-40   Hospital Day - 2    HPI:     Stefano Garcias is a 60 yo M with PMHx of PVD, right toe gangrene/osteomyelitis s/p AKA 2009, MV-CAD s/p PEA arrest, s/p CABG in 2019, s/p proximal R ICA stent, ICM with HFrEF 40%, T2DM, complete occlusion of L ICA and L vertebral artery and history of CVA with residual RUE deficits. Patient presented to the ED with altered mental status with last known well two days prior. Patient was found to be hypertensive with -250, tachycardic with HR 110s. Patient appeared disheveled. EKG showed ST elevation in V1-V3 with mild elevation in V4, cardiology was consulted, troponins were downtrending and they recommended heparin infusion. Patient glucose was 620 with beta hydroxy butyrate 2.43 and anion gap 26. Patient was started on insulin gtt. Initial Labs in ED:  Cr 1.22  Lactic 2.3  Glucose 620  BHB 2.43  Anion Gap 26  Bicarb 22     pH 7.35 / 52.3 / 28.8 / 52.6  LFT unremarkable  TSH normal  WBC 19.7  UA negative for UTI     CT head negative  CXR unremarkable     CTA HEAD AND NECK  1. 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 2.  Patent right cervical internal carotid artery stent demonstrates   mild-to-moderate in stent stenosis. Follow-up catheter angiogram may be   helpful for further evaluation. 3.  Multifocal areas of moderate to severe stenosis in the right middle   cerebral artery branches. 4.  Multifocal severe stenoses in the right anterior cerebral artery A2 and   A3 segments. 5.  Focal severe stenosis in the left subclavian artery secondary to   noncalcified atherosclerotic plaque at the level of the left vertebral artery   origin.      SUBJECTIVE:     OVERNIGHT EVENTS:    No acute events overnight    AWAKE & FOLLOWING COMMANDS:  [] No   [x] Yes    SECRETIONS Amount:  [] Small [] Moderate  [] Large  [x] None  Color:     [] White [] Colored  [] Bloody    SEDATION:  RAAS Score:  [] Propofol gtt  [] Versed gtt  [] Ativan gtt   [x] No Sedation    PARALYZED:  [x] No    [] Yes    VASOPRESSORS:  [x] No    [] Yes  [] Levophed [] Dopamine [] Vasopressin  [] Dobutamine [] Phenylephrine [] Epinephrine      OBJECTIVE:     VITAL SIGNS:  BP 98/61   Pulse (!) 49   Temp 97.6 °F (36.4 °C) (Axillary)   Resp 20   Ht 5' 6\" (1.676 m)   Wt 151 lb 7.3 oz (68.7 kg)   SpO2 95%   BMI 25.20 kg/m²   Tmax over 24 hours:  Temp (24hrs), Av.2 °F (36.8 °C), Min:97.6 °F (36.4 °C), Max:98.5 °F (36.9 °C)      Patient Vitals for the past 8 hrs:   BP Pulse Resp SpO2 Weight   23 0700 98/61 (!) 49 20 95 % --   23 0600 98/65 60 (P) 18 98 % --   23 0524 109/70 (!) 48 19 97 % --   23 0523 -- -- -- -- 151 lb 7.3 oz (68.7 kg)   23 0300 102/62 68 22 96 % --   23 0200 134/68 74 16 -- --   23 0100 (!) 129/59 73 20 93 % --   23 0000 138/73 74 16 96 % --         Intake/Output Summary (Last 24 hours) at 2023 0758  Last data filed at 2023 0700  Gross per 24 hour   Intake 3325.53 ml   Output 800 ml   Net 2525.53 ml     Date 23 0000 - 23 2359   Shift 5006-7933 9906-4952 7019-5345 24 Hour Total   INTAKE   I.V.(mL/kg) 1103.6(16.1)   1103.6(16.1)   IV Piggyback(mL/kg) 13.3(0.2)   13.3(0.2)   Shift Total(mL/kg) 2486.5(65.6)   0369.8(11.1)   OUTPUT   Urine(mL/kg/hr) 250   250   Shift Total(mL/kg) 250(3.6)   250(3.6)   Weight (kg) 68.7 68.7 68.7 68.7     Wt Readings from Last 3 Encounters:   23 151 lb 7.3 oz (68.7 kg)   23 190 lb (86.2 kg)   22 180 lb (81.6 kg)     Body mass index is 25.2 kg/m².         PHYSICAL EXAM:  GEN:  Arousable, cooperative  EYES:   pupils equal, round, and reactive to light  HEENT:  Normocephalic, without obvious abnormality  LUNGS:  good air exchange  CV:    regular rate and rhythm  ABDOMEN: soft and non-distended  MSK:    R AKA  NEURO[de-identified]   Arousable, following commands, residual RUE deficit, able to grasp with left hand and wiggles left toes and R knee  EXTREMITIES:  distal pulses intact, R AKA      MEDICATIONS:  Scheduled Meds:   insulin lispro  0-16 Units SubCUTAneous Q4H    famotidine (PEPCID) injection  20 mg IntraVENous BID    cefTRIAXone (ROCEPHIN) IV  1,000 mg IntraVENous Q24H    atorvastatin  40 mg Oral Nightly    aspirin  81 mg Oral Daily    therapeutic multivitamin-minerals  1 tablet Oral Daily with breakfast    sodium chloride  15 mL/kg IntraVENous Once    sodium chloride flush  5-40 mL IntraVENous 2 times per day    thiamine and folic acid IVPB   IntraVENous Daily    metoprolol  2.5 mg IntraVENous Q6H     Continuous Infusions:   dexmedetomidine 0.3 mcg/kg/hr (01/09/23 0613)    dextrose      dextrose 5% and 0.45% NaCl with KCl 20 mEq 75 mL/hr at 01/09/23 0521    heparin (PORCINE) Infusion 18 Units/kg/hr (01/09/23 0604)    sodium chloride 250 mL/hr at 01/07/23 2358    dextrose 5% and 0.45% NaCl with KCl 20 mEq 150 mL/hr at 01/08/23 1011    sodium chloride       PRN Meds:   glucose, 4 tablet, PRN  dextrose bolus, 125 mL, PRN   Or  dextrose bolus, 250 mL, PRN  glucagon (rDNA), 1 mg, PRN  dextrose, , Continuous PRN  heparin (porcine), 4,000 Units, PRN  heparin (porcine), 2,000 Units, PRN  dextrose bolus, 125 mL, PRN   Or  dextrose bolus, 250 mL, PRN  potassium chloride, 10 mEq, PRN  magnesium sulfate, 1,000 mg, PRN  sodium phosphate IVPB, 10 mmol, PRN   Or  sodium phosphate IVPB, 15 mmol, PRN   Or  sodium phosphate IVPB, 20 mmol, PRN  dextrose 5% and 0.45% NaCl with KCl 20 mEq, , Continuous PRN  sodium chloride flush, 5-40 mL, PRN  sodium chloride, , PRN  ondansetron, 4 mg, Q8H PRN   Or  ondansetron, 4 mg, Q6H PRN  polyethylene glycol, 17 g, Daily PRN  acetaminophen, 650 mg, Q6H PRN   Or  acetaminophen, 650 mg, Q6H PRN        SUPPORT DEVICES: [] Ventilator [] BIPAP  [x] Nasal Cannula [] Room Air    DATA:  Complete Blood Count:   Recent Labs     01/07/23 1646 01/08/23 0237 01/09/23  0458   WBC 19.7* 22.6* 15.8*   RBC 6.02* 5.11 4.42   HGB 17.8* 15.3 12.9*   HCT 51.4* 44.5 40.2*   MCV 85.4 87.1 91.0   MCH 29.6 29.9 29.2   MCHC 34.6 34.4 32.1   RDW 12.5 12.6 13.0    314 223   MPV 11.6 11.3 11.1        Last 3 Blood Glucose:   Recent Labs     01/07/23 2324 01/08/23 0237 01/08/23  0901 01/08/23  1200 01/08/23  1739 01/08/23 1919 01/09/23  0002 01/09/23  0458   GLUCOSE 398* 207* 145* 262* 80 99 176* 165*        PT/INR:    Lab Results   Component Value Date/Time    PROTIME 13.7 08/23/2022 11:39 PM    INR 1.1 08/23/2022 11:39 PM     PTT:    Lab Results   Component Value Date/Time    APTT 44.1 01/09/2023 04:58 AM       Comprehensive Metabolic Profile:   Recent Labs     01/07/23 1646 01/07/23 2324 01/08/23 1919 01/09/23  0002 01/09/23  0458   *   < > 135 138 145*   K 3.8   < > 3.4* 4.4 4.0   CL 86*   < > 100 102 109*   CO2 22   < > 29 29 21   BUN 27*   < > 16 15 16   CREATININE 1.22*   < > 0.70 0.72 0.79   GLUCOSE 620*   < > 99 176* 165*   CALCIUM 9.3   < > 7.7* 7.9* 8.4*   PROT 7.2  --   --   --   --    LABALBU 3.3*  --   --   --   --    BILITOT 0.4  --   --   --   --    ALKPHOS 125  --   --   --   --    AST 16  --   --   --   --    ALT 18  --   --   --   --     < > = values in this interval not displayed.       Magnesium:   Lab Results   Component Value Date/Time    MG 2.2 01/09/2023 04:58 AM    MG 2.0 01/09/2023 12:02 AM    MG 2.2 01/08/2023 07:19 PM     Phosphorus:   Lab Results   Component Value Date/Time    PHOS 2.4 01/09/2023 04:58 AM    PHOS 1.8 01/09/2023 12:02 AM    PHOS 1.8 01/08/2023 07:19 PM     Ionized Calcium:   Lab Results   Component Value Date/Time    CAION 1.13 07/04/2019 04:40 AM    CAION 0.97 04/09/2019 12:21 AM    CAION 1.01 04/07/2019 07:47 PM        Urinalysis:   Lab Results   Component Value Date/Time    NITRU NEGATIVE 01/07/2023 05:08 PM    COLORU Yellow 01/07/2023 05:08 PM    PHUR 5.5 01/07/2023 05:08 PM    WBCUA 0 TO 2 01/07/2023 05:08 PM    RBCUA 0 TO 2 01/07/2023 05:08 PM    MUCUS NOT REPORTED 04/14/2019 01:01 AM    TRICHOMONAS NOT REPORTED 04/14/2019 01:01 AM    YEAST NOT REPORTED 04/14/2019 01:01 AM    BACTERIA NOT REPORTED 04/14/2019 01:01 AM    SPECGRAV 1.036 01/07/2023 05:08 PM    LEUKOCYTESUR NEGATIVE 01/07/2023 05:08 PM    UROBILINOGEN Normal 01/07/2023 05:08 PM    BILIRUBINUR NEGATIVE 01/07/2023 05:08 PM    GLUCOSEU 3+ 01/07/2023 05:08 PM    KETUA SMALL 01/07/2023 05:08 PM    AMORPHOUS NOT REPORTED 04/14/2019 01:01 AM       HgBA1c:    Lab Results   Component Value Date/Time    LABA1C 14.0 12/23/2021 08:30 AM     TSH:    Lab Results   Component Value Date/Time    TSH 0.45 01/07/2023 11:24 PM     Lactic Acid: No results found for: LACTA   Troponin: No results for input(s): TROPONINI in the last 72 hours.     ASSESSMENT:     Patient Active Problem List    Diagnosis Date Noted    Altered mental status 01/07/2023    DKA, type 2, not at goal [facility]) 01/07/2023    Pressure injury of right heel, unstageable ([address] 12/10/2020    Hx of AKA (above knee amputation), right ([address] 12/10/2020    Erectile dysfunction associated with type 2 diabetes mellitus ([address] 12/10/2020    Above knee amputation of right lower extremity ([address] 09/28/2019    Coronary artery disease 08/23/2019    Ischemic gangrene ([address]     Pyogenic inflammation of bone ([address]     Status post angiography of extremity 08/20/2019    Leukocytosis     Right-sided extracranial carotid artery stenosis 07/03/2019    Essential hypertension     Type 2 diabetes mellitus with diabetic neuropathy, without long-term current use of insulin (HCC)     Cellulitis of right lower extremity     Moderate malnutrition ([address] 04/26/2019    Acute CVA (cerebrovascular accident) ([address] 04/25/2019    Thrombocytosis     Right foot drop     Carotid stenosis, bilateral     Acute cerebral infarction associated with systemic hypoxia or ischemia ([facility] Multi infarct state     Hepatitis     Metabolic encephalopathy     Abnormal CT of the head     PAD (peripheral artery disease) (HCC) 04/07/2019    Subacute osteomyelitis of right tibia [facility])           PLAN:     Neuro  Arousable, following commands  Precedex gtt 0.3  Neurology consulted, concern for PRES syndrome  CTA head/neck 1/6 showing severe stenosis in L subclavian artery, R TOAN, MCA, ICA and vertebral arteries  MRI brain 1/8 - punctate R temporal periventricular stroke  On ASA and Lipitor  Neuroendovascular - continue ASA for punctate ischemic stroke - stenoses are chronic and unchanged since 2019, no intervention at this time  LTME 1/8 - moderate encephalopathy, left anterior temporal sharps suggesting underlying lesion or dysfunction, increased risk for focal seizures   Failed swallow study  Thiamine and folic acid given in ED     Cardiology  Hx CABG, R ICA stent, vertebral artery stenosis, TOAN MCA and ICA stenosis  EKG in ED showing ST elevation in V1-V3  Cardiology consulted  Heparin gtt  Troponin 30-25-30  -250 in ED - initially on cardene gtt, now off cardene gtt  Lopressor 2.5 q 6h     Pulmonary  2 L NC  CXR unremarkable     Renal  Cr 0.79, improving from 1.22  U out 800  D5 1/2 NS @ 75 ml/hr  K 4  Straight cath x 1 overnight     GI  NPO  Failed swallow study  SLP evaluation and modified barium swallow today  Pepcid 20 BID     ID  WBC 15.8, improving from 22  Afebrile  Vanco and zosyn in ED  Ceftriaxone  Blood cultures no growth x 1 day     Endo  Glucose now 99, was 620 on arrival  Insulin gtt discontinued  HDISS q 4 h  D5 1/2 NS @ 75/hr    WEAN PER PROTOCOL:  [] No   [] Yes  [x] N/A    ICU PROPHYLAXIS:  Stress ulcer:  [] PPI Agent  [x] C2Yughv [] Sucralfate  [] Other:  VTE:   [] Enoxaparin  [x] Unfract.  Heparin Subcut  [] EPC Cuffs    NUTRITION:  [x] NPO [] Tube Feeding (Specify: ) [] TPN  [] PO    HOME MEDS RECONCILED: [] No  [x] Yes    CONSULTATION NEEDED:  [] No  [x] Yes    FAMILY UPDATED: [] No  [x] Yes    TRANSFER OUT OF ICU:   [] No  [x] Yes      Helen Tam M.D. Emergency Medicine Resident, PGY-2  1/9/2023 7:58 AM        Attending Physician Statement  I have discussed the care of Rosita Garza, including pertinent history and exam findings with the resident. I have reviewed the key elements of all parts of the encounter with the resident. I have seen and examined the patient with the resident. I agree with the assessment and plan and status of the problem list as documented. Patient with multiple comorbidities including severe peripheral arterial disease, AKA, coronary artery disease multivessel status post CABG, carotid stenosis, status post stent in multiple narrowing of the carotid and cerebral vessels with ischemic cardiomyopathy admitted initially with altered mental status he was hypertensive on admission, hyperglycemic he was initially treated with Cardene drip, EKG was suggestive of ST elevation had been followed and seen by cardiology continued on heparin drip. He has been seen by neurology and neuro endovascular had MRI done and deemed chronic severe vascular disease no intervention done, EEG shows encephalopathy. He also had hyperglycemia with diabetic ketoacidosis treated with insulin drip and was taken off and transition to sliding scale. Is being treated with ceftriaxone empirically blood cultures so far negative WBC was 15.8 better chest x-ray shows mild increase marking. He is been on 2 L nasal cannula maintaining saturation urine output is good he is currently on D5 half saline as he is currently n.p.o. Needing modified barium and advise thick liquid.   When I saw him he was disoriented confused keep repeating different words and sentences which difficult to understand he has been off Precedex drip since this morning apparently this morning he was better and also yesterday he was able to understand and comprehend.  's ECG was concerning for increased risk of seizure he was loaded with Keppra by neurology and he was placed on LTM he.  If patient is started on oral feeding then will start with low-dose of Lantus insulin continue sliding scale and adjust Lantus. Will get chest x-ray tomorrow. Continue to monitor neurological status and mentation. If needed Precedex drip back may need Precedex drip. Continue monitor urine output renal function. Aspiration precaution. Discussed with nursing staff, treatment and plan discussed. Discussed with respiratory therapist.    Total critical care time caring for this patient with life threatening, unstable organ failure, including direct patient contact, management of life support systems, review of data including imaging and labs, discussions with other team members and physicians at least 35 min so far today, excluding procedures. Please note that this chart was generated using voice recognition Dragon dictation software. Although every effort was made to ensure the accuracy of this automated transcription, some errors in transcription may have occurred.      Daija De Leon MD  1/9/2023 1:47 PM

## 2023-01-09 NOTE — PROGRESS NOTES
Endovascular Neurosurgery Progress Note    SUBJECTIVE:   No acute events overnight    Review of Systems:  CONSTITUTIONAL:  negative for fevers, chills, fatigue and malaise    EYES:  negative for double vision, blurred vision and photophobia     HEENT:  negative for tinnitus, epistaxis and sore throat    RESPIRATORY:  negative for cough, shortness of breath, wheezing    CARDIOVASCULAR:  negative for chest pain, palpitations, syncope, edema    GASTROINTESTINAL:  negative for nausea, vomiting    GENITOURINARY:  negative for incontinence    MUSCULOSKELETAL:  negative for neck or back pain    NEUROLOGICAL:  Negative for weakness and tingling  negative for headaches and dizziness    PSYCHIATRIC:  negative for anxiety      Review of systems otherwise negative. OBJECTIVE:     Vitals:    01/09/23 0700   BP: 98/61   Pulse: (!) 49   Resp: 20   Temp:    SpO2: 95%        General:  Gen: normal habitus, NAD  HEENT: NCAT, mucosa moist  Cvs: RRR, S1 S2 normal  Resp: symmetric unlabored breathing  Abd: s/nd/nt  Ext: no edema  Skin: no lesions seen, warm and dry    Neuro:  Gen: awake and alert, oriented x2. Not oriented time   Lang/speech: moderate to severe aphasia, perseverating   CN: PERRL, EOMI, VFF, V1-3 intact, mild right face droop, hearing intact, shoulder shrug symmetric, tongue midline  Motor: grossly 5/5 UE and LE on the left, right arm 4/5, right leg amputated above knee 1/5  Sense: decrease on the left  Coord: deferred  DTR: deferred  Gait: deferred    NIH Stroke Scale:   1a  Level of consciousness: 0 - alert; keenly responsive   1b. LOC questions:  0 - answers both questions correctly   1c. LOC commands: 0 - performs both tasks correctly   2. Best Gaze: 0 - normal   3. Visual: 0 - no visual loss   4. Facial Palsy: 1 - minor paralysis (flattened nasolabial fold, asymmetric on smiling)   5a. Motor left arm: 0 - no drift, limb holds 90 (or 45) degrees for full 10 seconds   5b.   Motor right arm: 2 - some effort against gravity, limb cannot get to or maintain (if cued) 90 (or 45) degrees, drifts down to bed, but has some effort against gravity    6a. Motor left le - no drift; leg holds 30 degree position for full 5 seconds   6b  Motor right leg:  3 - no effort against gravity; leg falls to bed immediately   7. Limb Ataxia: 0 - absent   8. Sensory: 1 - mild to moderate sensory loss; patient feels pinprick is less sharp or is dull on the affected side; there is a loss of superficial pain with pinprick but patient is aware of being touched    9. Best Language:  2 - severe aphasia; all communication is through fragmentary expression; great need for inference, questioning, and guessing by the listener. Range of information that can be exchanged is limited; listener carries burden of communication. Examiner cannot identify materials provided from patient response. 10. Dysarthria: 1 - mild to moderate, patient slurs at least some words and at worst, can be understood with some difficulty   11. Extinction and Inattention: 0 - no abnormality         Total:   10     MRS: 05      LABS:   Reviewed. Lab Results   Component Value Date    HGB 12.9 (L) 2023    WBC 15.8 (H) 2023     2023     (H) 2023    BUN 16 2023    CREATININE 0.79 2023    AST 16 2023    ALT 18 2023    MG 2.2 2023    APTT 44.1 (H) 2023    INR 1.1 2022      No results found for: COVID19    RADIOLOGY:   Images were personally reviewed including:   Cerebral angiogram 7/3/2019:  1. Right proximal immediately after the origin cervical ICA atherosclerotic changes resulting into a focal area of stenosis measuring approximately 65% per NASCET criteria. 2.  The above mentioned stenosis was treated with post balloon angioplasty and stenting using a 7-10x 30 mm Acculink stent with no significant residual stenosis. 3.  Right common femoral artery severe stenosis with good collaterals. CTA head neck 1/7/23: R ICA Acculink stent patent and L ICA chronic occlusion seen before 2019. Dominant R VA and chronically occluded L VA       MRI brain w/o contrast 1/8/23: punctate right temporal periventricular stroke. 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 Patient presented to the ED with AMS and found to have glucose of 620 and possible sepsis. Endovascular consulted for chronic L ICA and L VA occlusion and hx of R ICA stent in 2019. Neuro exam was stable  PLAN:   - no intervention, CTA is stable and L ICA and L VA occlusion are chronic and known before 2019  - R ICA stent is patent  - patient has a punctate right temporal periventricular stroke, he is currently already on aspirin, recommend continue it  - patient is on heparin for ACS, cardiology and ICU managing it   - PT OT and SLP       Case discussed with Dr. Elio Rosales attending.     Teresa Mazariegos MD  Stroke, [facility]  [id number]  Electronically signed 1/9/2023 at 9:00 AM

## 2023-01-09 NOTE — PROGRESS NOTES
[id number] [facility] Neurology   [address] [place] [address]    Progress Note             Date:   1/9/2023  Patient name:  Diana Padron  Date of admission:  1/7/2023  4:22 PM  MRN:   [id number]  Account:  [de-identified]  YOB: 1965  PCP:    Anju Interiano MD  Room:   [address] [place] [address]  Code Status:    Full Code    Chief Complaint:     Chief Complaint   Patient presents with    Altered Mental Status       Interval hx: The patient was seen and examined at bedside. Is vitally stable, alert and oriented x 2. No acute events overnight. Subacute right infarct on MRI brain wo   EEG with left temporal LPD's      Brief History of Present Illness: This is a 53-year-old male HTN, PVD, right AKA 2019, DM, CAD S/P CABG, right ICA s/p stent ischemic gangrene, severe, hepatitis, was brought into ED for management of altered mental status. Patient last known well was on Thursday evening. Apparently patient was found on the floor in feces with altered consciousness. Was found by EMS unresponsive and nonverbal, hyperglycemia initially was tachycardic, hypertensive. CT head done on 1/7 was unremarkable for acute abnormalities. CTA head and neck showing chronic occlusion of left ICA and vertebral arteries. Multi iCAD disease. Endovascular were consulted with no interventions. Patient was found to have elevated troponin and is on heparin for NSTEMI. MRI brain showing tiny acute to subacute infarct of the posterior medial right temporal periventricular white matter. Encephalomalacia of the bilateral frontal and parietal lobes likely old infarcts, old lacunar infarct of the right cerebellar hemisphere and right thalamus. EEG was obtained showing mild to moderate nonspecific encephalopathy, continuous 1 Hz left anterior temporal lobe lateralized periodic discharges.     Past Medical History:     Past Medical History:   Diagnosis Date    CAD (coronary artery disease) 04/07/2019    triple [facility] 04/07/2019    Diabetes mellitus ([facility] 04/2019    ON RX    Hyperlipidemia 04/2019    ON RX HAS NOT HAD SINCE 06/30/2019    Hypertension 04/2019    ON RX NONE SINCE 06/2019    Osteolysis 04/07/2019    R Knee hardware    PEA (Pulseless electrical activity) ([facility] 04/07/2019    PVD (peripheral vascular disease) ([facility] 04/07/2019    Wound, open 09/2019    RIGHT STUMP DRAINING MOD AMT BLOODY DRAINAGE        Past Surgical History:     Past Surgical History:   Procedure Laterality Date    ABOVE KNEE AMPUTATION Right 09/28/2019    AMPUTATION ABOVE KNEE Right 9/28/2019    RIGHT ABOVE KNEE AMPUTATION performed by Isidro Silva MD at [address] [place] [address]  04/07/2019    MVD-Stv    CAROTID STENT PLACEMENT  07/03/2019    ACCULINK CAROTID STENT PLACED, MRI CONDITIONAL 5, 3T OK.      CORONARY ARTERY BYPASS GRAFT  04/07/2019    x4 per Dr. Sheri Godfrey, [address] GRAFT N/A 4/7/2019    EMERGENT CPR,  CORONARY ARTERY BYPASS X4, ON PUMP ,CHEST LEFT OPEN; SWAN, ANGELA PER [facility]ESTHESIA [facility]      unsure if correct known tibial surgery    HARDWARE REMOVAL Right 04/07/2019    R Knee internal screw protruding through skin    HARDWARE REMOVAL Right 04/22/2019    HARDWARE REMOVAL RIGHT TIBIA,    LEG AMPUTATION BELOW KNEE Right 8/25/2019    RIGHT FOOT GUILLOTINE AMPUTATION performed by Isidro Silva MD at [place] [address] Right 8/27/2019    RIGHT BELOW KNEE AMPUTATION FORMALIZATION performed by Isidro Silva MD at [address] [facility] N/A 4/8/2019    STERNAL  WOUND WASHOUT AND CLOSURE S/P CABG performed by Nandini Llanes MD at [facility] N/A 4/22/2019    HARDWARE REMOVAL RIGHT TIBIA, IRRIGATION AND DEBRIDEMENT [facility] performed by Niels Lundy MD at [address] FRACTURE SURGERY Right 2000    Fell off roof, external fixation and internal hardware    TONSILLECTOMY  1974        Medications Prior to Admission:     Prior to Admission medications    Medication Sig Start Date End Date Taking? Authorizing Provider   metoprolol tartrate (LOPRESSOR) 25 MG tablet Take 2 tablets by mouth 2 times daily 8/24/22   Jerardo Carey MD   clopidogrel (PLAVIX) 75 MG tablet TAKE 1 TABLET BY MOUTH DAILY 2/17/22   Sissy Hair MD   gabapentin (NEURONTIN) 300 MG capsule TAKE 1 CAPSULE BY MOUTH EVERY 8 HOURS 2/17/22 3/19/22  Sissy Hair MD   metFORMIN (GLUCOPHAGE) 1000 MG tablet Take 1 tablet by mouth 2 times daily (with meals) 1/13/22 3/14/22  Sissy Hair MD   insulin lispro, 1 Unit Dial, (HUMALOG KWIKPEN) 100 UNIT/ML SOPN INJECT SUBCUTANEOUSLY BEFORE MEALS AS DIRECTED PER SLIDING SCALE: SEE ATTACHED FOR SLIDING SCALE DIRECTIONS: Hold all insulin and notify Dr immediately if BG <60. -200: 2 UNITS; -250: 4UNITS; 251-300 6 UNITS; 301-350: 8 UNITS  Patient not taking: Reported on 1/13/2022 1/5/22   Sissy Hair MD   lisinopril-hydroCHLOROthiazide (PRINZIDE;ZESTORETIC) 20-25 MG per tablet Take 2 tablets by mouth daily .   Take 1 pill if blood pressure is less than 130/80 12/23/21   Sissy Hair MD   insulin glargine (LANTUS SOLOSTAR) 100 UNIT/ML injection pen Inject 24 Units into the skin nightly 11/9/21 1/13/22  Yusra Vitale MD   Insulin Syringe-Needle U-100 30G X 5/16\" 1 ML MISC 1 each by Does not apply route daily 11/9/21   Yusra Vitale MD   atorvastatin (LIPITOR) 40 MG tablet Take 1 tablet by mouth nightly 10/19/21   Chelsea Colunga MD   metoprolol tartrate (LOPRESSOR) 50 MG tablet Take 1 tablet by mouth 2 times daily 10/19/21   Chelsea Colunga MD   blood glucose test strips (ONETOUCH VERIO) strip TEST 2 TIMES A DAY & AS NEEDED FOR SYMPTOMS OF IRREGULAR BLOOD GLUCOSE. 5/28/21   Aldo Winter MD   Alcohol Swabs (B-D SINGLE USE SWABS REGULAR) PADS USE AS DIRECTED WITH TESTING SUPPLIES 5/28/21 Elvis Clark MD   Lancets (ONETOUCH DELICA PLUS [id number]) MISC USE AS DIRECTED TO TEST BLOOD SUGAR 5/28/21   Elvis Clark MD   sildenafil (VIAGRA) 100 MG tablet Take 1 tablet by mouth as needed for Erectile Dysfunction 1/7/21   Elvis Clark MD   Blood Glucose Monitoring Suppl (ONE TOUCH ULTRA 2) w/Device KIT U UTD ONCE D 9/27/19   Historical Provider, MD   acetaminophen (TYLENOL) 500 MG tablet Take 2 tablets by mouth every 8 hours as needed for Pain 9/30/19   Doll Flood, DO   Handicap Placard MISC by Does not apply route For 5 years 9/27/19   Elvis Clark MD   Blood Glucose Monitoring Suppl [facility] BLOOD GLUCOSE METER) w/Device KIT 1 kit by Does not apply route once for 1 dose 9/27/19 9/27/19  Elvis Clark MD   Insulin Pen Needle 32G X 6 MM MISC 1 each by Does not apply route daily 9/27/19   Elvis Clark MD   furosemide (LASIX) 20 MG tablet Take 1 tablet by mouth daily  Patient not taking: Reported on 11/16/2021 7/4/19   ISAAC Funk - CNP   aspirin 81 MG chewable tablet Take 1 tablet by mouth daily 5/10/19   Bita Donovan MD   Multiple Vitamins-Minerals (THERAPEUTIC MULTIVITAMIN-MINERALS) tablet Take 1 tablet by mouth daily (with breakfast) 5/10/19   Bita Donovan MD        Allergies:     Patient has no known allergies. Social History:     Tobacco:    reports that he has been smoking cigarettes. He started smoking about 2 years ago. He has a 20.00 pack-year smoking history. He has never used smokeless tobacco.  Alcohol:      reports no history of alcohol use. Drug Use:  reports no history of drug use.     Family History:     Family History   Problem Relation Age of Onset    Heart Disease Mother         CAD WITH STENT    Diabetes Mother     Stroke Mother     Diabetes Father     Stroke Father     Heart Disease Father         CAD    High Blood Pressure Father     High Blood Pressure Sister     Diabetes Brother     Heart Disease Brother     High Blood Pressure Brother        Review of Systems:     Review of Systems Unable due to altered mental status     Physical Exam:   BP 98/61   Pulse (!) 49   Temp 97.6 °F (36.4 °C) (Axillary)   Resp 20   Ht 5' 6\" (1.676 m)   Wt 151 lb 7.3 oz (68.7 kg)   SpO2 95%   BMI 25.20 kg/m²   Temp (24hrs), Av.2 °F (36.8 °C), Min:97.6 °F (36.4 °C), Max:98.5 °F (36.9 °C)    Recent Labs     23  0118 23  0321 23  0752   POCGLU 150* 207* 279* 99       Intake/Output Summary (Last 24 hours) at 2023 0847  Last data filed at 2023 0700  Gross per 24 hour   Intake 3325.53 ml   Output 800 ml   Net 2525.53 ml         Neurologic Exam     GENERAL  Appears comfortable and in no distress   HEENT  NC/ AT   HEART  S1 and S2 heard; palpation of pulses: radial pulse    NECK  Supple and no bruits heard   MENTAL STATUS: Opens eyes spontaneously, does not follow commands, not oriented to time aphasia present, not answering questions.    CRANIAL NERVES: II     -      Visual fields intact to confrontation  III,IV,VI -  PERR, EOMs full, no ptosis  V     -     Normal facial sensation   VII    -     Normal facial symmetry  VIII   -     Intact hearing   IX,X -     Symmetrical palate  XI    -     Symmetrical shoulder shrug  XII   -     Midline tongue, no atrophy    MOTOR FUNCTION: RUE: Significant for good strength of grade 4+/5 in proximal and distal muscle groups   LUE: Significant for good strength of grade 5/5 in proximal and distal muscle groups   RLE: Amputated  LLE: Significant for good strength of grade 5/5 in proximal and distal muscle groups      Normal bulk, normal tone and no involuntary movements, no tremor   SENSORY FUNCTION:  Normal touch, normal pinprick, normal vibration, normal proprioception   CEREBELLAR FUNCTION:  Intact fine motor control over upper limbs and lower limbs   REFLEX FUNCTION:  Symmetric in upper and lower extremities, no Babinski sign   STATION and GAIT Not examined       Investigations:      Laboratory Testing:  Recent Results (from the past 24 hour(s))   Basic Metabolic Panel    Collection Time: 01/08/23  9:01 AM   Result Value Ref Range    Glucose 145 (H) 70 - 99 mg/dL    BUN 17 6 - 20 mg/dL    Creatinine 0.60 (L) 0.70 - 1.20 mg/dL    Est, Glom Filt Rate >60 >60 mL/min/1.73m2    Calcium 7.9 (L) 8.6 - 10.4 mg/dL    Sodium 138 135 - 144 mmol/L    Potassium 3.1 (L) 3.7 - 5.3 mmol/L    Chloride 102 98 - 107 mmol/L    CO2 29 20 - 31 mmol/L    Anion Gap 7 (L) 9 - 17 mmol/L   Magnesium    Collection Time: 01/08/23  9:01 AM   Result Value Ref Range    Magnesium 2.3 1.6 - 2.6 mg/dL   Phosphorus    Collection Time: 01/08/23  9:01 AM   Result Value Ref Range    Phosphorus 2.4 (L) 2.5 - 4.5 mg/dL   APTT    Collection Time: 01/08/23  9:01 AM   Result Value Ref Range    PTT 78.1 (H) 20.5 - 30.5 sec   Troponin    Collection Time: 01/08/23  9:01 AM   Result Value Ref Range    Troponin, High Sensitivity 32 (H) 0 - 22 ng/L   POC Glucose Fingerstick    Collection Time: 01/08/23 11:40 AM   Result Value Ref Range    POC Glucose 278 (H) 75 - 110 mg/dL   EKG 12 Lead    Collection Time: 01/08/23 11:53 AM   Result Value Ref Range    Ventricular Rate 79 BPM    Atrial Rate 79 BPM    P-R Interval 154 ms    QRS Duration 88 ms    Q-T Interval 432 ms    QTc Calculation (Bazett) 495 ms    P Axis 65 degrees    R Axis 42 degrees    T Axis 180 degrees   Basic Metabolic Panel    Collection Time: 01/08/23 12:00 PM   Result Value Ref Range    Glucose 262 (H) 70 - 99 mg/dL    BUN 16 6 - 20 mg/dL    Creatinine 0.63 (L) 0.70 - 1.20 mg/dL    Est, Glom Filt Rate >60 >60 mL/min/1.73m2    Calcium 7.9 (L) 8.6 - 10.4 mg/dL    Sodium 137 135 - 144 mmol/L    Potassium 3.7 3.7 - 5.3 mmol/L    Chloride 99 98 - 107 mmol/L    CO2 29 20 - 31 mmol/L    Anion Gap 9 9 - 17 mmol/L   Magnesium    Collection Time: 01/08/23 12:00 PM   Result Value Ref Range    Magnesium 2.2 1.6 - 2.6 mg/dL   Phosphorus    Collection Time: 01/08/23 12:00 PM   Result Value Ref Range Phosphorus 3.3 2.5 - 4.5 mg/dL   Troponin    Collection Time: 01/08/23 12:00 PM   Result Value Ref Range    Troponin, High Sensitivity 30 (H) 0 - 22 ng/L   POC Glucose Fingerstick    Collection Time: 01/08/23  4:39 PM   Result Value Ref Range    POC Glucose 148 (H) 75 - 110 mg/dL   Basic Metabolic Panel    Collection Time: 01/08/23  5:39 PM   Result Value Ref Range    Glucose 80 70 - 99 mg/dL    BUN 16 6 - 20 mg/dL    Creatinine 0.72 0.70 - 1.20 mg/dL    Est, Glom Filt Rate >60 >60 mL/min/1.73m2    Calcium 8.1 (L) 8.6 - 10.4 mg/dL    Sodium 136 135 - 144 mmol/L    Potassium 3.3 (L) 3.7 - 5.3 mmol/L    Chloride 99 98 - 107 mmol/L    CO2 31 20 - 31 mmol/L    Anion Gap 6 (L) 9 - 17 mmol/L   Magnesium    Collection Time: 01/08/23  5:39 PM   Result Value Ref Range    Magnesium 2.2 1.6 - 2.6 mg/dL   Phosphorus    Collection Time: 01/08/23  5:39 PM   Result Value Ref Range    Phosphorus 2.0 (L) 2.5 - 4.5 mg/dL   APTT    Collection Time: 01/08/23  5:39 PM   Result Value Ref Range    PTT 35.5 (H) 20.5 - 30.5 sec   Troponin    Collection Time: 01/08/23  5:39 PM   Result Value Ref Range    Troponin, High Sensitivity 32 (H) 0 - 22 ng/L   Basic Metabolic Panel    Collection Time: 01/08/23  7:19 PM   Result Value Ref Range    Glucose 99 70 - 99 mg/dL    BUN 16 6 - 20 mg/dL    Creatinine 0.70 0.70 - 1.20 mg/dL    Est, Glom Filt Rate >60 >60 mL/min/1.73m2    Calcium 7.7 (L) 8.6 - 10.4 mg/dL    Sodium 135 135 - 144 mmol/L    Potassium 3.4 (L) 3.7 - 5.3 mmol/L    Chloride 100 98 - 107 mmol/L    CO2 29 20 - 31 mmol/L    Anion Gap 6 (L) 9 - 17 mmol/L   Magnesium    Collection Time: 01/08/23  7:19 PM   Result Value Ref Range    Magnesium 2.2 1.6 - 2.6 mg/dL   Phosphorus    Collection Time: 01/08/23  7:19 PM   Result Value Ref Range    Phosphorus 1.8 (L) 2.5 - 4.5 mg/dL   POC Glucose Fingerstick    Collection Time: 01/08/23  8:34 PM   Result Value Ref Range    POC Glucose 150 (H) 75 - 110 mg/dL   Basic Metabolic Panel    Collection Time: 01/09/23 12:02 AM   Result Value Ref Range    Glucose 176 (H) 70 - 99 mg/dL    BUN 15 6 - 20 mg/dL    Creatinine 0.72 0.70 - 1.20 mg/dL    Est, Glom Filt Rate >60 >60 mL/min/1.73m2    Calcium 7.9 (L) 8.6 - 10.4 mg/dL    Sodium 138 135 - 144 mmol/L    Potassium 4.4 3.7 - 5.3 mmol/L    Chloride 102 98 - 107 mmol/L    CO2 29 20 - 31 mmol/L    Anion Gap 7 (L) 9 - 17 mmol/L   Magnesium    Collection Time: 01/09/23 12:02 AM   Result Value Ref Range    Magnesium 2.0 1.6 - 2.6 mg/dL   Phosphorus    Collection Time: 01/09/23 12:02 AM   Result Value Ref Range    Phosphorus 1.8 (L) 2.5 - 4.5 mg/dL   APTT    Collection Time: 01/09/23 12:02 AM   Result Value Ref Range    PTT 54.5 (H) 20.5 - 30.5 sec   POC Glucose Fingerstick    Collection Time: 01/09/23  1:18 AM   Result Value Ref Range    POC Glucose 207 (H) 75 - 110 mg/dL   POC Glucose Fingerstick    Collection Time: 01/09/23  3:21 AM   Result Value Ref Range    POC Glucose 279 (H) 75 - 110 mg/dL   Basic Metabolic Panel    Collection Time: 01/09/23  4:58 AM   Result Value Ref Range    Glucose 165 (H) 70 - 99 mg/dL    BUN 16 6 - 20 mg/dL    Creatinine 0.79 0.70 - 1.20 mg/dL    Est, Glom Filt Rate >60 >60 mL/min/1.73m2    Calcium 8.4 (L) 8.6 - 10.4 mg/dL    Sodium 145 (H) 135 - 144 mmol/L    Potassium 4.0 3.7 - 5.3 mmol/L    Chloride 109 (H) 98 - 107 mmol/L    CO2 21 20 - 31 mmol/L    Anion Gap 15 9 - 17 mmol/L   Magnesium    Collection Time: 01/09/23  4:58 AM   Result Value Ref Range    Magnesium 2.2 1.6 - 2.6 mg/dL   Phosphorus    Collection Time: 01/09/23  4:58 AM   Result Value Ref Range    Phosphorus 2.4 (L) 2.5 - 4.5 mg/dL   CBC with Auto Differential    Collection Time: 01/09/23  4:58 AM   Result Value Ref Range    WBC 15.8 (H) 3.5 - 11.3 k/uL    RBC 4.42 4.21 - 5.77 m/uL    Hemoglobin 12.9 (L) 13.0 - 17.0 g/dL    Hematocrit 40.2 (L) 40.7 - 50.3 %    MCV 91.0 82.6 - 102.9 fL    MCH 29.2 25.2 - 33.5 pg    MCHC 32.1 28.4 - 34.8 g/dL    RDW 13.0 11.8 - 14.4 %    Platelets 223 138 - 453 k/uL    MPV 11.1 8.1 - 13.5 fL    NRBC Automated 0.0 0.0 per 100 WBC    Seg Neutrophils 67 (H) 36 - 65 %    Lymphocytes 24 24 - 43 %    Monocytes 8 3 - 12 %    Eosinophils % 1 1 - 4 %    Basophils 0 0 - 2 %    Immature Granulocytes 1 (H) 0 %    Segs Absolute 10.55 (H) 1.50 - 8.10 k/uL    Absolute Lymph # 3.81 (H) 1.10 - 3.70 k/uL    Absolute Mono # 1.21 (H) 0.10 - 1.20 k/uL    Absolute Eos # 0.09 0.00 - 0.44 k/uL    Basophils Absolute 0.05 0.00 - 0.20 k/uL    Absolute Immature Granulocyte 0.08 0.00 - 0.30 k/uL   APTT    Collection Time: 01/09/23  4:58 AM   Result Value Ref Range    PTT 44.1 (H) 20.5 - 30.5 sec   POC Glucose Fingerstick    Collection Time: 01/09/23  7:52 AM   Result Value Ref Range    POC Glucose 99 75 - 110 mg/dL     Recent Labs     01/09/23  0458   WBC 15.8*   RBC 4.42   HGB 12.9*   HCT 40.2*   MCV 91.0   MCH 29.2   MCHC 32.1   RDW 13.0      MPV 11.1     Recent Labs     01/07/23  1646 01/07/23  2324 01/09/23  0458   *   < > 145*   K 3.8   < > 4.0   CL 86*   < > 109*   CO2 22   < > 21   BUN 27*   < > 16   CREATININE 1.22*   < > 0.79   GLUCOSE 620*   < > 165*   CALCIUM 9.3   < > 8.4*   PROT 7.2  --   --    LABALBU 3.3*  --   --    BILITOT 0.4  --   --    ALKPHOS 125  --   --    AST 16  --   --    ALT 18  --   --     < > = values in this interval not displayed. 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   CT head with no acute abnormalities, showing encephalopathy bifrontal areas. CTA head and neck showing multiple iCAD disease. Elevated troponin is on heparin for NSTEMI. MRI brain showing subacute infarct of the right temporal white matter along with encephalomalacia of bilateral frontal and parietal lobes. EEG showing nonspecific encephalopathy and left temporal LPD's. Impression: Ischemic stroke, possible seizure as a sequelae of old stroke, dysphagia     Plan:     Loaded with Keppra 2 g IV once  Continue with Keppra maintenance dose 500 mg twice daily  Continue with heparin for NSTEMI  ASA 81 mg   Lipitor 40 mg  Lipid panel, A1c   Echo completed today pending results      Follow-up further recommendations after discussing the case with attending  The plan was discussed with the patient, patient's family and the medical staff. Consultations:   IP CONSULT TO CARDIOLOGY  IP CONSULT TO NEUROLOGY  IP CONSULT TO HOSPITALIST  IP CONSULT TO FAMILY MEDICINE  IP CONSULT TO ENDOVASCULAR NEUROSURGERY  IP CONSULT TO CRITICAL CARE  IP CONSULT TO DIABETES EDUCATOR    Patient is admitted as inpatient status because of co-morbidities listed above, severity of signs and symptoms as outlined, requirement for current medical therapies and most importantly because of direct risk to patient if care not provided in a hospital setting.     Marcella López MD  Neurology Resident PGY-2  1/9/2023  8:47 AM    Copy sent to Dr. Alexia Howell MD

## 2023-01-09 NOTE — PLAN OF CARE
Problem: Discharge Planning  Goal: Discharge to home or other facility with appropriate resources  1/9/2023 0144 by Magdy Corona RN  Outcome: Progressing  1/8/2023 1947 by Reagan Aschoff, RN  Outcome: Progressing     Problem: Chronic Conditions and Co-morbidities  Goal: Patient's chronic conditions and co-morbidity symptoms are monitored and maintained or improved  1/9/2023 0144 by Magdy Corona RN  Outcome: Progressing  1/8/2023 1947 by Reagan Aschoff, RN  Outcome: Progressing     Problem: Safety - Medical Restraint  Goal: Remains free of injury from restraints (Restraint for Interference with Medical Device)  Description: INTERVENTIONS:  1. Determine that other, less restrictive measures have been tried or would not be effective before applying the restraint  2. Evaluate the patient's condition at the time of restraint application  3. Inform patient/family regarding the reason for restraint  4.  Q2H: Monitor safety, psychosocial status, comfort, nutrition and hydration  1/9/2023 0144 by Magdy Corona RN  Outcome: Progressing  1/8/2023 1947 by Reagan Aschoff, RN  Outcome: Progressing  Flowsheets (Taken 1/8/2023 1854)  Remains free of injury from restraints (restraint for interference with medical device): Every 2 hours: Monitor safety, psychosocial status, comfort, nutrition and hydration

## 2023-01-09 NOTE — CONSULTS
Attestation signed by      Attending Physician Statement:    I have discussed the care of  Shaheed Sood , including pertinent history and exam findings, with the Cardiology fellow/resident. I have seen and examined the patient and the key elements of all parts of the encounter have been performed by me. I agree with the assessment, plan and orders as documented by the fellow/resident, after I modified exam findings and plan of treatments, and the final version is my approved version of the assessment. Additional Comments: [facility]    Consult / H&P               Today's Date: 1/9/2023  Patient Name: Shaheed Sood  Date of admission: 1/7/2023  4:22 PM  Patient's age: 61 y.o., 1965  Admission Dx: Altered mental status [R41.82]  DKA, type 2, not at goal [facility]) [E11.10]  Altered mental status, unspecified altered mental status type [R41.82]    Reason for Consult:  Cardiac evaluation    Requesting Physician: Zulema Park MD    CHIEF COMPLAINT: Altered mental status    History Obtained From:  electronic medical record    HISTORY OF PRESENT ILLNESS:      The patient is a 61 y.o.  male who is admitted to the hospital for management of altered mental status    Cardiology consulted for abnormal EKG    Patient has medical history of peripheral vascular disease status post AKA, CAD status post CABG in 2019, ischemic cardiomyopathy with EF of 40%, diabetes mellitus type 2, history of CVA    Presented to the ED with altered mental status. CT head was negative. CTA head and neck showed chronic occlusion of left ICA and vertebral arteries, focal severe stenosis of left subclavian artery, multifocal stenosis of MCA, right TOAN. MRI brain showed tiny acute to subacute infarction of the right temporal region, malacia of bilateral pontine parietal lobes.     As per chart review at baseline he is normal and completes all his daily activities independently and was found altered    On arrival found to be hypertensive, tachycardic  ALMITA, DKA  Admitted to medical ICU  EKG the ED showed mild ST elevations in V1 to V3, started on heparin infusion  DKA resolved    Troponin flat 25> 30> 30> 32  Repeat EKG yesterday showing LVH, non specific ST changes in anterior septal leads   On heparin infusion, on aspirin and statin    Neurology on board  Endovascular on board for occlusions of ICA and vertebral artery no intervention planned    Last echo in august 2019, EF > 65%, trivial pericardial effusion  Last cath in April 2019, showed MVD, CABG in April 2019      Past Medical History:   has a past medical history of CAD (coronary artery disease), Cardiac arrest ([facility] [address] Diabetes mellitus ([facility] [address] Hyperlipidemia, Hypertension, Osteolysis, PEA (Pulseless electrical activity) ([facility] [address] PVD (peripheral vascular disease) ([facility] [address] and Wound, open. Past Surgical History:   has a past surgical history that includes Femur fracture surgery (Right, 2000); Cardiac catheterization (04/07/2019); Femur Surgery; Tonsillectomy (1974); Coronary artery bypass graft (04/07/2019); Hardware Removal (Right, 04/07/2019); Tibia fracture surgery (Right, 2000); Coronary artery bypass graft (N/A, 4/7/2019); RECONSTRUCTIVE REPAIR STERNAL (N/A, 4/8/2019); Hardware Removal (Right, 04/22/2019); REMOVE HARDWARE FEMUR (N/A, 4/22/2019); Carotid stent placement (07/03/2019); Leg amputation below knee (Right, 8/25/2019); Leg amputation below knee (Right, 8/27/2019); above knee amputation (Right, 09/28/2019); and AMPUTATION ABOVE KNEE (Right, 9/28/2019). Home Medications:    Prior to Admission medications    Medication Sig Start Date End Date Taking?  Authorizing Provider   metoprolol tartrate (LOPRESSOR) 25 MG tablet Take 2 tablets by mouth 2 times daily 8/24/22   Gayle Darby MD   clopidogrel (PLAVIX) 75 MG tablet TAKE 1 TABLET BY MOUTH DAILY 2/17/22   Jack Riley MD   gabapentin (NEURONTIN) 300 MG capsule TAKE 1 CAPSULE BY MOUTH EVERY 8 HOURS 2/17/22 3/19/22  Benny Coulter MD   metFORMIN (GLUCOPHAGE) 1000 MG tablet Take 1 tablet by mouth 2 times daily (with meals) 1/13/22 3/14/22  Benny Coulter MD   insulin lispro, 1 Unit Dial, (HUMALOG KWIKPEN) 100 UNIT/ML SOPN INJECT SUBCUTANEOUSLY BEFORE MEALS AS DIRECTED PER SLIDING SCALE: SEE ATTACHED FOR SLIDING SCALE DIRECTIONS: Hold all insulin and notify Dr immediately if BG <60. -200: 2 UNITS; -250: 4UNITS; 251-300 6 UNITS; 301-350: 8 UNITS  Patient not taking: Reported on 1/13/2022 1/5/22   Benny Coulter MD   lisinopril-hydroCHLOROthiazide (PRINZIDE;ZESTORETIC) 20-25 MG per tablet Take 2 tablets by mouth daily .   Take 1 pill if blood pressure is less than 130/80 12/23/21   Benny Coulter MD   insulin glargine (LANTUS SOLOSTAR) 100 UNIT/ML injection pen Inject 24 Units into the skin nightly 11/9/21 1/13/22  Montserrat Mims MD   Insulin Syringe-Needle U-100 30G X 5/16\" 1 ML MISC 1 each by Does not apply route daily 11/9/21   Montserrat Mims MD   atorvastatin (LIPITOR) 40 MG tablet Take 1 tablet by mouth nightly 10/19/21   Kellen Carter MD   metoprolol tartrate (LOPRESSOR) 50 MG tablet Take 1 tablet by mouth 2 times daily 10/19/21   Kellen Carter MD   blood glucose test strips (ONETOUCH VERIO) strip TEST 2 TIMES A DAY & AS NEEDED FOR SYMPTOMS OF IRREGULAR BLOOD GLUCOSE. 5/28/21   Tracy Alamo MD   Alcohol Swabs (B-D SINGLE USE SWABS REGULAR) PADS USE AS DIRECTED WITH TESTING SUPPLIES 5/28/21   Tracy Alamo MD   Lancets ([address]) [address] [facility] USE AS DIRECTED TO TEST BLOOD SUGAR 5/28/21   Tracy Alamo MD   sildenafil (VIAGRA) 100 MG tablet Take 1 tablet by mouth as needed for Erectile Dysfunction 1/7/21   Tracy Alamo MD   Blood Glucose Monitoring Suppl (ONE TOUCH ULTRA 2) w/Device KIT U UTD ONCE D 9/27/19   Historical Provider, MD   acetaminophen (TYLENOL) 500 MG tablet Take 2 tablets by mouth every 8 hours as needed for Pain 9/30/19   DO Choco Abarca [address] [facility] by Does not apply route For 5 years 9/27/19   Juliette Borges MD   Blood Glucose Monitoring Suppl [facility] BLOOD GLUCOSE METER) w/Device KIT 1 kit by Does not apply route once for 1 dose 9/27/19 9/27/19  Juliette Borges MD   Insulin Pen Needle 32G X 6 MM MISC 1 each by Does not apply route daily 9/27/19   Juliette Borges MD   furosemide (LASIX) 20 MG tablet Take 1 tablet by mouth daily  Patient not taking: Reported on 11/16/2021 7/4/19   Quillian Gosselin, APRN - CNP   aspirin 81 MG chewable tablet Take 1 tablet by mouth daily 5/10/19   Glenn Roblero MD   Multiple Vitamins-Minerals (THERAPEUTIC MULTIVITAMIN-MINERALS) tablet Take 1 tablet by mouth daily (with breakfast) 5/10/19   Glnen Roblero MD       Allergies:  Patient has no known allergies. Social History:   reports that he has been smoking cigarettes. He started smoking about 2 years ago. He has a 20.00 pack-year smoking history. He has never used smokeless tobacco. He reports that he does not drink alcohol and does not use drugs. Family History: family history includes Diabetes in his brother, father, and mother; Heart Disease in his brother, father, and mother; High Blood Pressure in his brother, father, and sister; Stroke in his father and mother. REVIEW OF SYSTEMS:  could not obtained due patient being altered      PHYSICAL EXAM:      BP 98/61   Pulse (!) 49   Temp 97.6 °F (36.4 °C) (Axillary)   Resp 20   Ht 5' 6\" (1.676 m)   Wt 151 lb 7.3 oz (68.7 kg)   SpO2 95%   BMI 25.20 kg/m²    Constitutional and General Appearance: awake and alert, altered  HEENT: PERRL  Respiratory:  Clear auscultation  Cardiovascular:  Normal heart sounds  Abdomen:   soft  Extremities:   Lower extremity edema: No  Neurological:  Alert and oriented. Moves all extremities well    DATA:    Diagnostics:    EKG: non specific ST changes and LVH. ECHO: ordered, but not yet obtained. Stress Test: not obtained. Cardiac Angiography: not obtained.     Labs:     CBC:   Recent Labs     01/08/23  0237 01/09/23  0458   WBC 22.6* 15.8*   HGB 15.3 12.9*   HCT 44.5 40.2*    223     BMP:   Recent Labs     01/09/23  0002 01/09/23 0458    145*   K 4.4 4.0   CO2 29 21   BUN 15 16   CREATININE 0.72 0.79   LABGLOM >60 >60   GLUCOSE 176* 165*     BNP: No results for input(s): BNP in the last 72 hours. PT/INR: No results for input(s): PROTIME, INR in the last 72 hours.   APTT:  Recent Labs     01/09/23  0002 01/09/23 0458   APTT 54.5* 44.1*     CARDIAC ENZYMES:  Recent Labs     01/07/23  1736   CKTOTAL 72     FASTING LIPID PANEL:  Lab Results   Component Value Date/Time    HDL 24 01/13/2022 09:40 AM    LDLDIRECT 138 01/13/2022 09:40 AM    TRIG 316 01/13/2022 09:40 AM     LIVER PROFILE:  Recent Labs     01/07/23  1646   AST 16   ALT 18   LABALBU 3.3*       IMPRESSION:    Patient Active Problem List   Diagnosis    PAD (peripheral artery disease) (HCC)    Subacute osteomyelitis of right tibia (HCC)    Metabolic encephalopathy    Abnormal CT of the head    Hepatitis    Acute cerebral infarction associated with systemic hypoxia or ischemia (HCC)    Multi infarct state    Carotid stenosis, bilateral    Right foot drop    Thrombocytosis    Acute CVA (cerebrovascular accident) ([address]    Moderate malnutrition ([address]    Cellulitis of right lower extremity    Right-sided extracranial carotid artery stenosis    Essential hypertension    Type 2 diabetes mellitus with diabetic neuropathy, without long-term current use of insulin (HCC)    Leukocytosis    Status post angiography of extremity    Ischemic gangrene ([address]    Pyogenic inflammation of bone ([address]    Coronary artery disease    Above knee amputation of right lower extremity (HCC)    Pressure injury of right heel, unstageable (HCC)    Hx of AKA (above knee amputation), right (HCC)    Erectile dysfunction associated with type 2 diabetes mellitus ([address]    Altered mental status    DKA, type 2, not at goal [facility])       RECOMMENDATIONS:  Altered mental status  DKA  CAD s/p CABG  PVD  Left subclavian stenosis  Ischemic cardiomyopathy, EF improved, last EF > 65%    - continue heparin  - trend troponin, repeat EKG today  - will review EKG with attending  - final recommendations to follow      Electronically signed by Guillermina Avendano MD on 1/9/2023 at 8:00 AM    Lizeth Dimas  PGY-2  Internal Medicine  [facility], [facility], [address] [place] [address] Dr   9:46 AM 1/9/2023    Attending Physician Statement  I have discussed the care of the patient, including pertinent history and exam findings, with the resident. I have seen and examined the patient and the key elements of all parts of the encounter have been performed by me. I agree with the assessment, plan and orders as documented by the resident.   Patient with mental status change patient is just saying 1 word is a good day  Seems to be without any distress  At present patient is getting EEG  Echocardiogram was done was told that patient had a LV thrombus  Patient is on already on heparin  Extensive history of CAD CABG clinically no chest pain  Tropes are flat  Will continue to follow  Sanjay Sterling MD

## 2023-01-09 NOTE — PLAN OF CARE
Problem: Discharge Planning  Goal: Discharge to home or other facility with appropriate resources  Outcome: Progressing     Problem: Chronic Conditions and Co-morbidities  Goal: Patient's chronic conditions and co-morbidity symptoms are monitored and maintained or improved  Outcome: Progressing     Problem: Safety - Medical Restraint  Goal: Remains free of injury from restraints (Restraint for Interference with Medical Device)  Description: INTERVENTIONS:  1. Determine that other, less restrictive measures have been tried or would not be effective before applying the restraint  2. Evaluate the patient's condition at the time of restraint application  3. Inform patient/family regarding the reason for restraint  4.  Q2H: Monitor safety, psychosocial status, comfort, nutrition and hydration  Outcome: Progressing  Flowsheets (Taken 1/8/2023 3064)  Remains free of injury from restraints (restraint for interference with medical device): Every 2 hours: Monitor safety, psychosocial status, comfort, nutrition and hydration

## 2023-01-09 NOTE — PROCEDURES
Referring physician: Darlene Chawla MD  Date: 1/9/2023  Start Time:1/8/2023 @ 1700  End Time:1/9/2023 @ 0730    Indication  Patient with encephalopathy, EEG done to rule out subclinical seizures. Introduction  This continuous video-EEG was acquired using a [employer] workstation at 256 samples/s. Electrodes were placed according to the International 10-20 system. Automated spike and seizure detection algorithms were applied. Video was recorded during this study. Description  During the maximal alert state, a fairly-regulated 6-7 Hz posterior dominant rhythm was seen which was asymmetrical. There was continuous left temporal consistent focal slowing or interhemispheric asymmetry was noted. Normal sleep structures were not observed. There were continuous 1 Hz left anterior temporal lobe lateralized periodic discharges. Events  No events reported. Impression  Abnormal continuous vEEG recording, the slowing mentioned above suggests mild-moderate non specific encephalopathy. The presence of left anterior temporal lobe sharps and asymmetry suggests underlying lesion/dysfunction and increases patient risk for focal seizures. Magy Durham MD  Epilepsy Board Certified. Neurology Board Certified.     Electronically Signed

## 2023-01-09 NOTE — PROCEDURES
INSTRUMENTAL SWALLOW REPORT  MODIFIED BARIUM SWALLOW    NAME: Dexter Robles   : 1965  MRN: [id number]       Date of Eval: 2023              Referring Diagnosis(es):      Past Medical History:  has a past medical history of CAD (coronary artery disease), Cardiac arrest ([facility] [address] Diabetes mellitus ([facility] [address] Hyperlipidemia, Hypertension, Osteolysis, PEA (Pulseless electrical activity) ([facility] [address] PVD (peripheral vascular disease) ([facility] [address] and Wound, open. Past Surgical History:  has a past surgical history that includes Femur fracture surgery (Right, ); Cardiac catheterization (2019); Femur Surgery; Tonsillectomy (); Coronary artery bypass graft (2019); Hardware Removal (Right, 2019); Tibia fracture surgery (Right, ); Coronary artery bypass graft (N/A, 2019); RECONSTRUCTIVE REPAIR STERNAL (N/A, 2019); Hardware Removal (Right, 2019); REMOVE HARDWARE FEMUR (N/A, 2019); Carotid stent placement (2019); Leg amputation below knee (Right, 2019); Leg amputation below knee (Right, 2019); above knee amputation (Right, 2019); and AMPUTATION ABOVE KNEE (Right, 2019). Type of Study: Initial MBS       Patient Complaints/Reason for Referral:  Dexter Robles was referred for a MBS to assess the efficiency of his/her swallow function, assess for aspiration, and to make recommendations regarding safe dietary consistencies, effective compensatory strategies, and safe eating environment.        Onset of problem:      The patient is a 61 y.o. male patient brought into the ED for management of altered mentation, patient last seen well Thursday evening now almost 2 days later patient was found to be hypertensive tachycardic with repetitive movement of his chin and mouth, this shaved and dirty with poor nutrition and hearing, new abrasions of the right side of his head as well as the right hand,     -PMH significant for multivessel issue including peripheral vascular disease, stroke, osteomyelitis, right AKA, bilateral carotid stenosis type 2 diabetes, no prior alcohol use     -ED course: Lactic acid 2.3, urinalysis: Protein 3+, ketones 1.036, leukocyte esterase: Negative, TSH and T4 unremarkable, troponin 30, glucose initially was 620, creatinine 1.22, blood culture sent     -CT head W0: No acute intracranial normality, CT cervical spine: Motion artifact limits evaluation of lower cervical spine no acute abnormality seen  -CXR: No acute abnormality          Behavior/Cognition/Vision/Hearing:  Behavior/Cognition: Alert; Cooperative;Confused    Impressions:  Patient presents with  safe swallow for Dysphagia Minced and Moist (Dysphagia II)  diet with Mildly Thick (Nectar) liquids as evidenced by no observed aspiration noted with consistencies tested. Recommend small sips and bites, only feed when alert and awake and upright at 90 degrees for all PO intake. Recommend close monitoring for overt/clinical s/s of aspiration and D/C PO intake and complete Modified Barium Swallow Study should they occur. Results and recommendations reported to RN. Patient Position: Lateral       Consistencies Administered: Soft & Bite Sized;Pureed; Thin teaspoon; Thin cup;Mildly Thick teaspoon;Mildly Thick cup        Dysphagia Outcome Severity Scale: Level 4: Mild moderate dysphagia- Intermittent supervision/cueing. One - two diet consistencies restricted  Penetration-Aspiration Scale (PAS): 7 - Material enters the airway, passes below the vocal folds, and is not ejected from the trachea despite effort    Recommended Diet:  Solid consistency: Minced and Moist  Liquid consistency: Mildly Thick  Liquid administration via: Cup;Spoon    Medication administration: Meds in puree    Safe Swallow Protocol:     Compensatory Swallowing Strategies :  Alternate solids and liquids;Eat/Feed slowly;Upright as possible for all oral intake;Small bites/sips    Recommendations/Treatment  Requires SLP Intervention: Yes   Further therapy recommended at discharge.    3-5X/week  D/C Recommendations: Ongoing speech therapy is recommended during this hospitalization;Ongoing speech therapy is recommended at next level of care  Postural Changes and/or Swallow Maneuvers: Upright 90 degrees      Recommended Exercises:    Therapeutic Interventions: Diet tolerance monitoring;Laryngeal exercises;Pharyngeal exercises         Education: Images and recommendations were reviewed with pt following this exam.   Patient Education: yes  Patient Education Response: Needs reinforcement      Goals:    Long Term:    To Maximize safety with intake, optimize nutrition/hydration and minimize risk for aspiration.      Short Term:  Goals: The patient will tolerate recommended diet without observed clinical signs of aspiration      Oral Preparation / Oral Phase: Impaired     Pt. With 1 tooth.  Extended mastication with soft solids, unable to sip from a straw, decreased bolus formation and oral bolus holding. Intermittent cues needed to swallow      Pharyngeal Phase: Impaired     Soft Solids: No penetration no aspiration no stasis  Puree: No penetration no aspiration mild vallec and pyriform stasis  Thin tsp: trace penetration no aspiration mild vallec and min pyriform stasis  Thin cup: + penetration + aspiration + spillover latent cough  [place] tsp and straw: no penetration no aspiration mild vallec min pyriform stasis    Esophageal Phase  Esophageal Screen: WFL        Pain    0/10         Therapy Time:   Individual Concurrent Group Co-treatment   Time In  1015         Time Out  1024         Minutes  9                   DEVYN Castaneda, 1/9/2023, 12:02 PM

## 2023-01-09 NOTE — PLAN OF CARE
Problem: Discharge Planning  Goal: Discharge to home or other facility with appropriate resources  1/9/2023 1118 by Jerl Siemens, RN  Outcome: Progressing  1/9/2023 0144 by Jimi Estrada RN  Outcome: Progressing     Problem: Chronic Conditions and Co-morbidities  Goal: Patient's chronic conditions and co-morbidity symptoms are monitored and maintained or improved  1/9/2023 1118 by Jerl Siemens, RN  Outcome: Progressing  1/9/2023 0144 by Jimi Estrada RN  Outcome: Progressing     Problem: Safety - Medical Restraint  Goal: Remains free of injury from restraints (Restraint for Interference with Medical Device)  Description: INTERVENTIONS:  1. Determine that other, less restrictive measures have been tried or would not be effective before applying the restraint  2. Evaluate the patient's condition at the time of restraint application  3. Inform patient/family regarding the reason for restraint  4.  Q2H: Monitor safety, psychosocial status, comfort, nutrition and hydration  1/9/2023 1118 by Jerl Siemens, RN  Outcome: Progressing  Flowsheets  Taken 1/9/2023 0600 by Jimi Estrada RN  Remains free of injury from restraints (restraint for interference with medical device): Every 2 hours: Monitor safety, psychosocial status, comfort, nutrition and hydration  Taken 1/9/2023 0400 by Jimi Estrada RN  Remains free of injury from restraints (restraint for interference with medical device): Every 2 hours: Monitor safety, psychosocial status, comfort, nutrition and hydration  Taken 1/9/2023 0200 by Jimi Estrada RN  Remains free of injury from restraints (restraint for interference with medical device): Every 2 hours: Monitor safety, psychosocial status, comfort, nutrition and hydration  1/9/2023 0144 by Jimi Estrada RN  Outcome: Progressing  Flowsheets  Taken 1/9/2023 0000  Remains free of injury from restraints (restraint for interference with medical device): Every 2 hours: Monitor safety, psychosocial status, comfort, nutrition and hydration  Taken 1/8/2023 2200  Remains free of injury from restraints (restraint for interference with medical device): Every 2 hours: Monitor safety, psychosocial status, comfort, nutrition and hydration  Taken 1/8/2023 2000  Remains free of injury from restraints (restraint for interference with medical device): Every 2 hours: Monitor safety, psychosocial status, comfort, nutrition and hydration     Problem: Confusion  Goal: Confusion, delirium, dementia, or psychosis is improved or at baseline  Description: INTERVENTIONS:  1. Assess for possible contributors to thought disturbance, including medications, impaired vision or hearing, underlying metabolic abnormalities, dehydration, psychiatric diagnoses, and notify attending LIP  2. [place] high risk fall precautions, as indicated  3. Provide frequent short contacts to provide reality reorientation, refocusing and direction  4. Decrease environmental stimuli, including noise as appropriate  5. Monitor and intervene to maintain adequate nutrition, hydration, elimination, sleep and activity  6. If unable to ensure safety without constant attention obtain sitter and review sitter guidelines with assigned personnel  7. Initiate Psychosocial CNS and Spiritual Care consult, as indicated  Outcome: Progressing     Problem: Skin/Tissue Integrity  Goal: Absence of new skin breakdown  Description: 1. Monitor for areas of redness and/or skin breakdown  2. Assess vascular access sites hourly  3. Every 4-6 hours minimum:  Change oxygen saturation probe site  4. Every 4-6 hours:  If on nasal continuous positive airway pressure, respiratory therapy assess nares and determine need for appliance change or resting period.   Outcome: Progressing

## 2023-01-10 ENCOUNTER — APPOINTMENT (OUTPATIENT)
Dept: GENERAL RADIOLOGY | Age: 58
DRG: 405 | End: 2023-01-10
Payer: MEDICAID

## 2023-01-10 LAB
ABSOLUTE EOS #: 0.15 K/UL (ref 0–0.44)
ABSOLUTE IMMATURE GRANULOCYTE: 0.15 K/UL (ref 0–0.3)
ABSOLUTE LYMPH #: 2.77 K/UL (ref 1.1–3.7)
ABSOLUTE MONO #: 1.02 K/UL (ref 0.1–1.2)
ANION GAP SERPL CALCULATED.3IONS-SCNC: 9 MMOL/L (ref 9–17)
BASOPHILS # BLD: 0 % (ref 0–2)
BASOPHILS ABSOLUTE: 0 K/UL (ref 0–0.2)
BUN BLDV-MCNC: 11 MG/DL (ref 6–20)
CALCIUM SERPL-MCNC: 8.5 MG/DL (ref 8.6–10.4)
CARBOXYHEMOGLOBIN: 1.2 % (ref 0–5)
CHLORIDE BLD-SCNC: 104 MMOL/L (ref 98–107)
CO2: 24 MMOL/L (ref 20–31)
CREAT SERPL-MCNC: 0.69 MG/DL (ref 0.7–1.2)
EKG ATRIAL RATE: 71 BPM
EKG P AXIS: 56 DEGREES
EKG P-R INTERVAL: 174 MS
EKG Q-T INTERVAL: 454 MS
EKG QRS DURATION: 96 MS
EKG QTC CALCULATION (BAZETT): 493 MS
EKG R AXIS: 50 DEGREES
EKG T AXIS: -155 DEGREES
EKG VENTRICULAR RATE: 71 BPM
EOSINOPHILS RELATIVE PERCENT: 1 % (ref 1–4)
FIO2: ABNORMAL
GFR SERPL CREATININE-BSD FRML MDRD: >60 ML/MIN/1.73M2
GLUCOSE BLD-MCNC: 111 MG/DL (ref 75–110)
GLUCOSE BLD-MCNC: 111 MG/DL (ref 75–110)
GLUCOSE BLD-MCNC: 155 MG/DL (ref 70–99)
GLUCOSE BLD-MCNC: 188 MG/DL (ref 75–110)
GLUCOSE BLD-MCNC: 194 MG/DL (ref 75–110)
GLUCOSE BLD-MCNC: 204 MG/DL (ref 75–110)
GLUCOSE BLD-MCNC: 298 MG/DL (ref 75–110)
GLUCOSE BLD-MCNC: 44 MG/DL (ref 75–110)
GLUCOSE BLD-MCNC: 49 MG/DL (ref 75–110)
GLUCOSE BLD-MCNC: 85 MG/DL (ref 75–110)
GLUCOSE BLD-MCNC: 93 MG/DL (ref 75–110)
HCO3 VENOUS: 21.8 MMOL/L (ref 24–30)
HCT VFR BLD CALC: 46.6 % (ref 40.7–50.3)
HEMOGLOBIN: 15 G/DL (ref 13–17)
IMMATURE GRANULOCYTES: 1 %
LYMPHOCYTES # BLD: 19 % (ref 24–43)
MAGNESIUM: 2 MG/DL (ref 1.6–2.6)
MCH RBC QN AUTO: 30.1 PG (ref 25.2–33.5)
MCHC RBC AUTO-ENTMCNC: 32.2 G/DL (ref 28.4–34.8)
MCV RBC AUTO: 93.6 FL (ref 82.6–102.9)
MONOCYTES # BLD: 7 % (ref 3–12)
MORPHOLOGY: NORMAL
NEGATIVE BASE EXCESS, VEN: 6.3 MMOL/L (ref 0–2)
NRBC AUTOMATED: 0 PER 100 WBC
O2 SAT, VEN: 59.4 % (ref 60–85)
PARTIAL THROMBOPLASTIN TIME: 28.1 SEC (ref 20.5–30.5)
PARTIAL THROMBOPLASTIN TIME: 35.9 SEC (ref 20.5–30.5)
PARTIAL THROMBOPLASTIN TIME: 72.8 SEC (ref 20.5–30.5)
PATIENT TEMP: 37
PCO2, VEN: 55.3 MM HG (ref 39–55)
PDW BLD-RTO: 13.1 % (ref 11.8–14.4)
PH VENOUS: 7.22 (ref 7.32–7.42)
PHOSPHORUS: 2.3 MG/DL (ref 2.5–4.5)
PLATELET # BLD: 222 K/UL (ref 138–453)
PMV BLD AUTO: 12 FL (ref 8.1–13.5)
PO2, VEN: 38.9 MM HG (ref 30–50)
POTASSIUM SERPL-SCNC: 3.8 MMOL/L (ref 3.7–5.3)
RBC # BLD: 4.98 M/UL (ref 4.21–5.77)
SEG NEUTROPHILS: 72 % (ref 36–65)
SEGMENTED NEUTROPHILS ABSOLUTE COUNT: 10.51 K/UL (ref 1.5–8.1)
SODIUM BLD-SCNC: 137 MMOL/L (ref 135–144)
WBC # BLD: 14.6 K/UL (ref 3.5–11.3)

## 2023-01-10 PROCEDURE — 85025 COMPLETE CBC W/AUTO DIFF WBC: CPT

## 2023-01-10 PROCEDURE — 93005 ELECTROCARDIOGRAM TRACING: CPT

## 2023-01-10 PROCEDURE — 94660 CPAP INITIATION&MGMT: CPT

## 2023-01-10 PROCEDURE — 2000000000 HC ICU R&B

## 2023-01-10 PROCEDURE — 82947 ASSAY GLUCOSE BLOOD QUANT: CPT

## 2023-01-10 PROCEDURE — 2500000003 HC RX 250 WO HCPCS

## 2023-01-10 PROCEDURE — 36415 COLL VENOUS BLD VENIPUNCTURE: CPT

## 2023-01-10 PROCEDURE — 2580000003 HC RX 258

## 2023-01-10 PROCEDURE — 6370000000 HC RX 637 (ALT 250 FOR IP): Performed by: STUDENT IN AN ORGANIZED HEALTH CARE EDUCATION/TRAINING PROGRAM

## 2023-01-10 PROCEDURE — 2500000003 HC RX 250 WO HCPCS: Performed by: STUDENT IN AN ORGANIZED HEALTH CARE EDUCATION/TRAINING PROGRAM

## 2023-01-10 PROCEDURE — 6360000002 HC RX W HCPCS

## 2023-01-10 PROCEDURE — 85730 THROMBOPLASTIN TIME PARTIAL: CPT

## 2023-01-10 PROCEDURE — 99291 CRITICAL CARE FIRST HOUR: CPT | Performed by: INTERNAL MEDICINE

## 2023-01-10 PROCEDURE — 84100 ASSAY OF PHOSPHORUS: CPT

## 2023-01-10 PROCEDURE — 95720 EEG PHY/QHP EA INCR W/VEEG: CPT | Performed by: PSYCHIATRY & NEUROLOGY

## 2023-01-10 PROCEDURE — 83735 ASSAY OF MAGNESIUM: CPT

## 2023-01-10 PROCEDURE — C9254 INJECTION, LACOSAMIDE: HCPCS

## 2023-01-10 PROCEDURE — 82805 BLOOD GASES W/O2 SATURATION: CPT

## 2023-01-10 PROCEDURE — 6370000000 HC RX 637 (ALT 250 FOR IP): Performed by: FAMILY MEDICINE

## 2023-01-10 PROCEDURE — 71045 X-RAY EXAM CHEST 1 VIEW: CPT

## 2023-01-10 PROCEDURE — 6370000000 HC RX 637 (ALT 250 FOR IP)

## 2023-01-10 PROCEDURE — 99232 SBSQ HOSP IP/OBS MODERATE 35: CPT | Performed by: PSYCHIATRY & NEUROLOGY

## 2023-01-10 PROCEDURE — 95714 VEEG EA 12-26 HR UNMNTR: CPT

## 2023-01-10 PROCEDURE — 2580000003 HC RX 258: Performed by: STUDENT IN AN ORGANIZED HEALTH CARE EDUCATION/TRAINING PROGRAM

## 2023-01-10 PROCEDURE — 93010 ELECTROCARDIOGRAM REPORT: CPT | Performed by: INTERNAL MEDICINE

## 2023-01-10 PROCEDURE — 6360000002 HC RX W HCPCS: Performed by: STUDENT IN AN ORGANIZED HEALTH CARE EDUCATION/TRAINING PROGRAM

## 2023-01-10 PROCEDURE — G0108 DIAB MANAGE TRN  PER INDIV: HCPCS

## 2023-01-10 PROCEDURE — 80048 BASIC METABOLIC PNL TOTAL CA: CPT

## 2023-01-10 RX ORDER — FUROSEMIDE 10 MG/ML
40 INJECTION INTRAMUSCULAR; INTRAVENOUS ONCE
Status: COMPLETED | OUTPATIENT
Start: 2023-01-10 | End: 2023-01-10

## 2023-01-10 RX ORDER — METOPROLOL TARTRATE 5 MG/5ML
5 INJECTION INTRAVENOUS ONCE
Status: COMPLETED | OUTPATIENT
Start: 2023-01-10 | End: 2023-01-10

## 2023-01-10 RX ORDER — INSULIN LISPRO 100 [IU]/ML
0-8 INJECTION, SOLUTION INTRAVENOUS; SUBCUTANEOUS EVERY 6 HOURS
Status: DISCONTINUED | OUTPATIENT
Start: 2023-01-10 | End: 2023-01-17

## 2023-01-10 RX ORDER — LEVETIRACETAM 100 MG/ML
1000 SOLUTION ORAL 2 TIMES DAILY
Status: DISCONTINUED | OUTPATIENT
Start: 2023-01-10 | End: 2023-01-10

## 2023-01-10 RX ORDER — FUROSEMIDE 10 MG/ML
INJECTION INTRAMUSCULAR; INTRAVENOUS
Status: COMPLETED
Start: 2023-01-10 | End: 2023-01-10

## 2023-01-10 RX ORDER — NITROGLYCERIN 20 MG/100ML
5-200 INJECTION INTRAVENOUS CONTINUOUS
Status: DISCONTINUED | OUTPATIENT
Start: 2023-01-10 | End: 2023-01-11

## 2023-01-10 RX ORDER — LEVETIRACETAM 100 MG/ML
500 SOLUTION ORAL 2 TIMES DAILY
Status: DISCONTINUED | OUTPATIENT
Start: 2023-01-10 | End: 2023-01-11

## 2023-01-10 RX ORDER — SODIUM CHLORIDE 9 MG/ML
INJECTION, SOLUTION INTRAVENOUS CONTINUOUS
Status: DISCONTINUED | OUTPATIENT
Start: 2023-01-10 | End: 2023-01-11

## 2023-01-10 RX ORDER — INSULIN LISPRO 100 [IU]/ML
0-8 INJECTION, SOLUTION INTRAVENOUS; SUBCUTANEOUS
Status: DISCONTINUED | OUTPATIENT
Start: 2023-01-10 | End: 2023-01-10

## 2023-01-10 RX ORDER — LORAZEPAM 2 MG/ML
0.5 INJECTION INTRAMUSCULAR ONCE
Status: COMPLETED | OUTPATIENT
Start: 2023-01-10 | End: 2023-01-10

## 2023-01-10 RX ADMIN — SODIUM CHLORIDE, PRESERVATIVE FREE 10 ML: 5 INJECTION INTRAVENOUS at 20:10

## 2023-01-10 RX ADMIN — FUROSEMIDE 40 MG: 10 INJECTION INTRAMUSCULAR; INTRAVENOUS at 20:59

## 2023-01-10 RX ADMIN — LEVETIRACETAM 500 MG: 500 SOLUTION ORAL at 08:16

## 2023-01-10 RX ADMIN — METOPROLOL TARTRATE 5 MG: 5 INJECTION, SOLUTION INTRAVENOUS at 20:53

## 2023-01-10 RX ADMIN — DEXMEDETOMIDINE HYDROCHLORIDE 1 MCG/KG/HR: 4 INJECTION, SOLUTION INTRAVENOUS at 22:39

## 2023-01-10 RX ADMIN — LORAZEPAM 0.5 MG: 2 INJECTION INTRAMUSCULAR; INTRAVENOUS at 21:37

## 2023-01-10 RX ADMIN — ASPIRIN 81 MG: 81 TABLET, CHEWABLE ORAL at 08:16

## 2023-01-10 RX ADMIN — FUROSEMIDE 40 MG: 10 INJECTION INTRAMUSCULAR; INTRAVENOUS at 21:15

## 2023-01-10 RX ADMIN — SODIUM CHLORIDE: 9 INJECTION, SOLUTION INTRAVENOUS at 04:42

## 2023-01-10 RX ADMIN — Medication 1 TABLET: at 08:16

## 2023-01-10 RX ADMIN — INSULIN LISPRO 8 UNITS: 100 INJECTION, SOLUTION INTRAVENOUS; SUBCUTANEOUS at 08:30

## 2023-01-10 RX ADMIN — SODIUM PHOSPHATE, MONOBASIC, MONOHYDRATE AND SODIUM PHOSPHATE, DIBASIC, ANHYDROUS 10 MMOL: 276; 142 INJECTION, SOLUTION INTRAVENOUS at 09:27

## 2023-01-10 RX ADMIN — HEPARIN SODIUM 4000 UNITS: 1000 INJECTION INTRAVENOUS; SUBCUTANEOUS at 15:38

## 2023-01-10 RX ADMIN — INSULIN LISPRO 4 UNITS: 100 INJECTION, SOLUTION INTRAVENOUS; SUBCUTANEOUS at 04:00

## 2023-01-10 RX ADMIN — DEXTROSE MONOHYDRATE 125 ML: 100 INJECTION, SOLUTION INTRAVENOUS at 11:31

## 2023-01-10 RX ADMIN — THIAMINE HYDROCHLORIDE: 100 INJECTION, SOLUTION INTRAMUSCULAR; INTRAVENOUS at 08:17

## 2023-01-10 RX ADMIN — FUROSEMIDE 40 MG: 10 INJECTION, SOLUTION INTRAMUSCULAR; INTRAVENOUS at 20:59

## 2023-01-10 RX ADMIN — DESMOPRESSIN ACETATE 40 MG: 0.2 TABLET ORAL at 20:09

## 2023-01-10 RX ADMIN — FUROSEMIDE 40 MG: 10 INJECTION, SOLUTION INTRAMUSCULAR; INTRAVENOUS at 21:15

## 2023-01-10 RX ADMIN — CEFTRIAXONE SODIUM 1000 MG: 10 INJECTION, POWDER, FOR SOLUTION INTRAVENOUS at 08:16

## 2023-01-10 RX ADMIN — NITROGLYCERIN 5 MCG/MIN: 20 INJECTION INTRAVENOUS at 21:10

## 2023-01-10 RX ADMIN — LACOSAMIDE 100 MG: 10 INJECTION INTRAVENOUS at 22:10

## 2023-01-10 RX ADMIN — SODIUM CHLORIDE, PRESERVATIVE FREE 20 MG: 5 INJECTION INTRAVENOUS at 08:16

## 2023-01-10 RX ADMIN — DEXMEDETOMIDINE HYDROCHLORIDE 0.4 MCG/KG/HR: 4 INJECTION, SOLUTION INTRAVENOUS at 02:30

## 2023-01-10 RX ADMIN — SODIUM CHLORIDE, PRESERVATIVE FREE 20 MG: 5 INJECTION INTRAVENOUS at 20:09

## 2023-01-10 RX ADMIN — DEXMEDETOMIDINE HYDROCHLORIDE 0.5 MCG/KG/HR: 4 INJECTION, SOLUTION INTRAVENOUS at 14:38

## 2023-01-10 RX ADMIN — HEPARIN SODIUM 18 UNITS/KG/HR: 10000 INJECTION, SOLUTION INTRAVENOUS at 12:13

## 2023-01-10 RX ADMIN — SODIUM CHLORIDE 200 MG: 9 INJECTION, SOLUTION INTRAVENOUS at 14:36

## 2023-01-10 RX ADMIN — LEVETIRACETAM 500 MG: 500 SOLUTION ORAL at 20:09

## 2023-01-10 RX ADMIN — HEPARIN SODIUM 2000 UNITS: 1000 INJECTION INTRAVENOUS; SUBCUTANEOUS at 06:42

## 2023-01-10 NOTE — PROGRESS NOTES
Inpatient Diabetes  Education     Type and Reason for Visit: Patient Education -    uncontrolled diabetes / hyperglycemia  Follow up rounding - 1/10/23 - Writer rounded patient in room, not able to be assessed for diabetes self care needs at this time due to patient with poor mentation at this time - per bedside RN patient only intermittently oriented to self. Patient with sitter and remains unable to engage with education.    Per chart review patient has history of diabetes type 2 , per A1C trend poor control.  Lab Results   Component Value Date    LABA1C 12.7 (H) 01/09/2023    LABA1C 14.0 12/23/2021    LABA1C 12.9 11/09/2021     Home medication list includes Insulins - lantus and humalog and oral metformin. Last PCP visit with [facility] was 1 /13/ 2022 and not refils for home meds or insulin noted.  No future PCP appt seen on appt desk.     [facility] Diabetes Education team will follow up with patient education and support as patient's condition allows.    Out patient diabetes education  contact number provided - [phone] to patient and placed on the discharge summary.    [facility] ANGLIN RN

## 2023-01-10 NOTE — PROGRESS NOTES
[facility]    Progress note               Today's Date: 1/10/2023  Patient Name: Angy Pope  Date of admission: 1/7/2023  4:22 PM  Patient's age: 61 y.o., 1965  Admission Dx: Altered mental status [R41.82]  DKA, type 2, not at goal [facility]) [E11.10]  Altered mental status, unspecified altered mental status type [R41.82]    Reason for Consult:  Cardiac evaluation    Requesting Physician: Jone Escalante MD    CHIEF COMPLAINT: Altered mental status    History Obtained From:  electronic medical record    SUBJECTIVE:  Was agitated overnight. Hemodynamically stable. Patient appears to be dysarthric at baseline but was altered, requiring a sitter at bedside. Did not respond to questions  Echo showed LV apical thrombus, reduced EF 35-40%, global hypokinesis  Heparin gtt ongoing    HISTORY OF PRESENT ILLNESS:      The patient is a 61 y.o.  male who is admitted to the hospital for management of altered mental status    Cardiology consulted for abnormal EKG    Patient has medical history of peripheral vascular disease status post AKA, CAD status post CABG in 2019, ischemic cardiomyopathy with EF of 40%, diabetes mellitus type 2, history of CVA    Presented to the ED with altered mental status. CT head was negative. CTA head and neck showed chronic occlusion of left ICA and vertebral arteries, focal severe stenosis of left subclavian artery, multifocal stenosis of MCA, right TOAN. MRI brain showed tiny acute to subacute infarction of the right temporal region, malacia of bilateral pontine parietal lobes.     As per chart review at baseline he is normal and completes all his daily activities independently and was found altered    On arrival found to be hypertensive, tachycardic  ALMITA, DKA  Admitted to medical ICU  EKG the ED showed mild ST elevations in V1 to V3, started on heparin infusion  DKA resolved    Troponin flat 25> 30> 30> 32  Repeat EKG yesterday showing LVH, non specific ST changes in anterior septal leads   On heparin infusion, on aspirin and statin    Neurology on board  Endovascular on board for occlusions of ICA and vertebral artery no intervention planned    Last echo in august 2019, EF > 65%, trivial pericardial effusion  Last cath in April 2019, showed MVD, CABG in April 2019      Past Medical History:   has a past medical history of CAD (coronary artery disease), Cardiac arrest ([facility] [address] Diabetes mellitus ([facility] [address] Hyperlipidemia, Hypertension, Osteolysis, PEA (Pulseless electrical activity) ([facility] [address] PVD (peripheral vascular disease) ([facility] [address] and Wound, open. Past Surgical History:   has a past surgical history that includes Femur fracture surgery (Right, 2000); Cardiac catheterization (04/07/2019); Femur Surgery; Tonsillectomy (1974); Coronary artery bypass graft (04/07/2019); Hardware Removal (Right, 04/07/2019); Tibia fracture surgery (Right, 2000); Coronary artery bypass graft (N/A, 4/7/2019); RECONSTRUCTIVE REPAIR STERNAL (N/A, 4/8/2019); Hardware Removal (Right, 04/22/2019); REMOVE HARDWARE FEMUR (N/A, 4/22/2019); Carotid stent placement (07/03/2019); Leg amputation below knee (Right, 8/25/2019); Leg amputation below knee (Right, 8/27/2019); above knee amputation (Right, 09/28/2019); and AMPUTATION ABOVE KNEE (Right, 9/28/2019). Home Medications:    Prior to Admission medications    Medication Sig Start Date End Date Taking?  Authorizing Provider   metoprolol tartrate (LOPRESSOR) 25 MG tablet Take 2 tablets by mouth 2 times daily 8/24/22   Kreri Jang MD   clopidogrel (PLAVIX) 75 MG tablet TAKE 1 TABLET BY MOUTH DAILY 2/17/22   Madonna Johnson MD   gabapentin (NEURONTIN) 300 MG capsule TAKE 1 CAPSULE BY MOUTH EVERY 8 HOURS 2/17/22 3/19/22  Madonna Johnson MD   metFORMIN (GLUCOPHAGE) 1000 MG tablet Take 1 tablet by mouth 2 times daily (with meals) 1/13/22 3/14/22  Madonna Johnson MD   insulin lispro, 1 Unit Dial, (HUMALOG KWIKPEN) 100 UNIT/ML SOPN INJECT SUBCUTANEOUSLY BEFORE MEALS AS DIRECTED PER SLIDING SCALE: SEE ATTACHED FOR SLIDING SCALE DIRECTIONS: Hold all insulin and notify Dr immediately if BG <60. -200: 2 UNITS; -250: 4UNITS; 251-300 6 UNITS; 301-350: 8 UNITS  Patient not taking: Reported on 1/13/2022 1/5/22   Nay Sam MD   lisinopril-hydroCHLOROthiazide (PRINZIDE;ZESTORETIC) 20-25 MG per tablet Take 2 tablets by mouth daily .   Take 1 pill if blood pressure is less than 130/80 12/23/21   Nay Sam MD   insulin glargine (LANTUS SOLOSTAR) 100 UNIT/ML injection pen Inject 24 Units into the skin nightly 11/9/21 1/13/22  Anotinette Olivier MD   Insulin Syringe-Needle U-100 30G X 5/16\" 1 ML MISC 1 each by Does not apply route daily 11/9/21   Antoinette Olivier MD   atorvastatin (LIPITOR) 40 MG tablet Take 1 tablet by mouth nightly 10/19/21   Duncan Mohan MD   metoprolol tartrate (LOPRESSOR) 50 MG tablet Take 1 tablet by mouth 2 times daily 10/19/21   Duncan Mohan MD   blood glucose test strips (ONETOUCH VERIO) strip TEST 2 TIMES A DAY & AS NEEDED FOR SYMPTOMS OF IRREGULAR BLOOD GLUCOSE. 5/28/21   Sarkis So MD   Alcohol Swabs (B-D SINGLE USE SWABS REGULAR) PADS USE AS DIRECTED WITH TESTING SUPPLIES 5/28/21   Sarkis So MD   Lancets ([address]) [address] [place] [address] USE AS DIRECTED TO TEST BLOOD SUGAR 5/28/21   Sarkis So MD   sildenafil (VIAGRA) 100 MG tablet Take 1 tablet by mouth as needed for Erectile Dysfunction 1/7/21   Sarkis So MD   Blood Glucose Monitoring Suppl (ONE TOUCH ULTRA 2) w/Device KIT U UTD ONCE D 9/27/19   Historical Provider, MD   acetaminophen (TYLENOL) 500 MG tablet Take 2 tablets by mouth every 8 hours as needed for Pain 9/30/19   Pari Beatty DO   Handjuliap Ravi [address] [facility] by Does not apply route For 5 years 9/27/19   Sarkis So MD   Blood Glucose Monitoring Suppl [facility] BLOOD GLUCOSE METER) w/Device KIT 1 kit by Does not apply route once for 1 dose 9/27/19 9/27/19  Sarkis So MD   Insulin Pen Needle 32G X 6 MM MISC 1 each by Does not apply route daily 9/27/19   Abimbola Capellan MD   furosemide (LASIX) 20 MG tablet Take 1 tablet by mouth daily  Patient not taking: Reported on 11/16/2021 7/4/19   ISAAC Schmitt - CNP   aspirin 81 MG chewable tablet Take 1 tablet by mouth daily 5/10/19   Drew Saleh MD   Multiple Vitamins-Minerals (THERAPEUTIC MULTIVITAMIN-MINERALS) tablet Take 1 tablet by mouth daily (with breakfast) 5/10/19   Drew Saleh MD       Allergies:  Patient has no known allergies. Social History:   reports that he has been smoking cigarettes. He started smoking about 2 years ago. He has a 20.00 pack-year smoking history. He has never used smokeless tobacco. He reports that he does not drink alcohol and does not use drugs. Family History: family history includes Diabetes in his brother, father, and mother; Heart Disease in his brother, father, and mother; High Blood Pressure in his brother, father, and sister; Stroke in his father and mother. REVIEW OF SYSTEMS:  could not obtained due patient being altered      PHYSICAL EXAM:      /71   Pulse 70   Temp 98.4 °F (36.9 °C)   Resp 28   Ht 5' 6\" (1.676 m)   Wt 149 lb 11.1 oz (67.9 kg)   SpO2 96%   BMI 24.91 kg/m²    Constitutional and General Appearance: awake and alert, altered  HEENT: PERRL  Respiratory:  Clear auscultation  Cardiovascular:  Normal heart sounds  Abdomen:   soft  Extremities:   Lower extremity edema: No  Neurological:  Alert and oriented. Moves all extremities well    DATA:    Diagnostics:    EKG: non specific ST changes and LVH. ECHO: ordered, but not yet obtained. Stress Test: not obtained. Cardiac Angiography: not obtained.     Labs:     CBC:   Recent Labs     01/09/23  0458 01/10/23  0430   WBC 15.8* 14.6*   HGB 12.9* 15.0   HCT 40.2* 46.6    222       BMP:   Recent Labs     01/09/23  0924 01/10/23  0430    137   K 4.0 3.8   CO2 20 24   BUN 16 11   CREATININE 0.76 0.69*   LABGLOM >60 >60 GLUCOSE 75 155*       BNP: No results for input(s): BNP in the last 72 hours. PT/INR: No results for input(s): PROTIME, INR in the last 72 hours.   APTT:  Recent Labs     01/09/23  2130 01/10/23  0430   APTT 59.5* 35.9*       CARDIAC ENZYMES:  Recent Labs     01/07/23  1736   CKTOTAL 72       FASTING LIPID PANEL:  Lab Results   Component Value Date/Time    HDL 26 01/09/2023 04:32 PM    LDLDIRECT 138 01/13/2022 09:40 AM    TRIG 135 01/09/2023 04:32 PM     LIVER PROFILE:  Recent Labs     01/07/23  1646   AST 16   ALT 18   LABALBU 3.3*         IMPRESSION:    Patient Active Problem List   Diagnosis    PAD (peripheral artery disease) (HCC)    Subacute osteomyelitis of right tibia (HCC)    Acute metabolic encephalopathy    Abnormal CT of the head    Hepatitis    Acute cerebral infarction associated with systemic hypoxia or ischemia (HCC)    Multi infarct state    Carotid stenosis, bilateral    Right foot drop    Thrombocytosis    Acute CVA (cerebrovascular accident) ([address]    Moderate malnutrition ([address]    Cellulitis of right lower extremity    Right-sided extracranial carotid artery stenosis    Essential hypertension    Type 2 diabetes mellitus with diabetic neuropathy, without long-term current use of insulin (HCC)    Leukocytosis    Status post angiography of extremity    Ischemic gangrene ([address]    Pyogenic inflammation of bone ([address]    Coronary artery disease    Above knee amputation of right lower extremity (HCC)    Pressure injury of right heel, unstageable (HCC)    Hx of AKA (above knee amputation), right (HCC)    Erectile dysfunction associated with type 2 diabetes mellitus (HCC)    Altered mental status    DKA, type 2, not at goal [facility])    Stenosis of right middle cerebral artery    At risk of seizures    Cardiomyopathy, ischemic    Acute ischemic right MCA stroke ([address]       RECOMMENDATIONS:  Altered mental status, concern for seizure  Subacute right temporal ischemic stroke  DKA  CAD s/p CABG x 4 grafts in 2019  PVD  Left ICA, vertebral artery stenosis, right ICA stenosis s/p stent  Ischemic cardiomyopathy, EF improved, last EF > 65%  LV apical thrombus  Systolic CHF with global hypokinesis, EF 35-40%  Elevated troponins, flat trend    - continue heparin gtt for LV thrombus, will require AC at time of discharge  - will require cardiac cath during this admission for new drop in EF as soon as cleared by Neurology  - Continue ASA, statin daily    Scarlet Constance  PGY-3  Internal Medicine  [facility], [facility]  1/10/2023 12:11 PM    Attending Cardiologist Addendum: I have reviewed and performed the history, physical, subjective, objective, assessment, and plan with the student/resident/fellow/APN and agree with the note. I performed the history and physical personally. I have made changes to the note above as needed. He has LV thrombus  Has reduced LVEF  On heparin drip  Needs cardiac cath this admission  But quite altered  Will re eval in AM  Need neuro to clear for cardiac cath as well given persistent seizures / CVA ? Discussed with patient in detail. All questions answered. Agrees with plan as outlined above. Thank you for allowing me to participate in the care of this patient, please do not hesitate to call if you have any questions. Trina Rodriguez DO, [facility], [address], [address] [facility]  [email or web address]  [phone]

## 2023-01-10 NOTE — PROCEDURES
LONG-TERM EEG-VIDEO [address]    Patient: Juarez Zelaya  Age: 61 y.o. MRN: [id number]    Referring Physician: No ref. provider found  History: The patient is a 61 y.o. male who presented breakthrough seizure/encephalopathy. This long-term video-EEG monitoring study was performed to determine the nature of the patient's clinical events. The patient is on neuroactive medications.    Juarez Zelaya   Current Facility-Administered Medications   Medication Dose Route Frequency Provider Last Rate Last Admin    dextrose 5 % and 0.45 % sodium chloride infusion   IntraVENous Continuous Ayaka Szymanski MD 75 mL/hr at 01/09/23 0935 New Bag at 01/09/23 0935    insulin glargine (LANTUS) injection vial 5 Units  5 Units SubCUTAneous Nightly Ayaka Szymanski MD   5 Units at 01/09/23 2023    levETIRAcetam (KEPPRA) 100 MG/ML solution 500 mg  500 mg Oral BID Ayaka Szymanski MD        dexmedetomidine (PRECEDEX) 400 mcg in sodium chloride 0.9 % 100 mL infusion  0.1-1.5 mcg/kg/hr IntraVENous Continuous Judd Howard MD 6.1 mL/hr at 01/09/23 1558 0.4 mcg/kg/hr at 01/09/23 1558    glucose chewable tablet 16 g  4 tablet Oral PRN Ayaka Szymanski MD        dextrose bolus 10% 125 mL  125 mL IntraVENous PRN Ayaka Szymanski MD        Or    dextrose bolus 10% 250 mL  250 mL IntraVENous PRN Ayaka Szymanski MD        glucagon (rDNA) injection 1 mg  1 mg SubCUTAneous PRN Ayaka Szymanski MD        dextrose 10 % infusion   IntraVENous Continuous PRN Ayaka Szymanski MD        insulin lispro (HUMALOG) injection vial 0-16 Units  0-16 Units SubCUTAneous Q4H Ayaka Szymanski MD   8 Units at 01/09/23 0324    famotidine (PEPCID) 20 mg in sodium chloride (PF) 0.9 % 10 mL injection  20 mg IntraVENous BID Ayaka Szymanski MD   20 mg at 01/09/23 1930    cefTRIAXone (ROCEPHIN) 1,000 mg in sterile water 10 mL IV syringe  1,000 mg IntraVENous Q24H Temi Chung MD   1,000 mg at 01/09/23 0754    heparin (porcine) injection 4,000 Units  4,000 Units IntraVENous TORON Ángela Bolanos MD   4,000 Units at 01/08/23 0357    heparin (porcine) injection 2,000 Units  2,000 Units IntraVENous GEOVANNA Bolanos MD   2,000 Units at 01/09/23 0602    heparin 25,000 units in dextrose 5 % 250 mL infusion (rate based)  5-30 Units/kg/hr IntraVENous Continuous Ángela Bolanos MD 13.1 mL/hr at 01/09/23 1556 16 Units/kg/hr at 01/09/23 1556    atorvastatin (LIPITOR) tablet 40 mg  40 mg Oral Nightly Ángela Bolanos MD   40 mg at 01/09/23 1930    aspirin chewable tablet 81 mg  81 mg Oral Daily Ángela Bolanos MD   81 mg at 01/09/23 1051    therapeutic multivitamin-minerals 1 tablet  1 tablet Oral Daily with breakfast Ángela Bolanos MD   1 tablet at 01/09/23 1051    dextrose bolus 10% 125 mL  125 mL IntraVENous TORON Ángela Bolanos MD        Or    dextrose bolus 10% 250 mL  250 mL IntraVENous TORON Ángela Bolanos MD        potassium chloride 10 mEq/100 mL IVPB (Peripheral Line)  10 mEq IntraVENous GEOVANNA Bolanos  mL/hr at 01/08/23 2148 10 mEq at 01/08/23 2148    magnesium sulfate 1000 mg in dextrose 5% 100 mL IVPB  1,000 mg IntraVENous PRN Ángela Bolanos MD        sodium phosphate 10 mmol in sodium chloride 0.9 % 250 mL IVPB  10 mmol IntraVENous PRN Ángela Bolanos MD        Or    sodium phosphate 15 mmol in dextrose 5 % 250 mL IVPB  15 mmol IntraVENous PRN Ángela Bolanos MD   Stopped at 01/08/23 1315    Or    sodium phosphate 20 mmol in dextrose 5 % 500 mL IVPB  20 mmol IntraVENous PRN Ángela Bolanos MD        sodium chloride flush 0.9 % injection 5-40 mL  5-40 mL IntraVENous 2 times per day Ángela Bolanos MD   10 mL at 01/09/23 1930    sodium chloride flush 0.9 % injection 5-40 mL  5-40 mL IntraVENous PRN Ángela Bolanos MD        0.9 % sodium chloride infusion   IntraVENous PRN Ángela Bolanos MD        ondansetron (ZOFRAN-ODT) disintegrating tablet 4 mg  4 mg Oral Q8H PRN Edith Norris MD        Or    ondansetron (ZOFRAN) injection 4 mg  4 mg IntraVENous Q6H PRN Edith Norris MD        polyethylene glycol (GLYCOLAX) packet 17 g  17 g Oral Daily PRN Edith Norris MD        acetaminophen (TYLENOL) tablet 650 mg  650 mg Oral Q6H PRN Edith Norris MD        Or    acetaminophen (TYLENOL) suppository 650 mg  650 mg Rectal Q6H PRN Edith Norris MD        folic acid 1 mg, thiamine (B-1) 100 mg in sodium chloride 0.9 % 50 mL IVPB   IntraVENous Daily Edith Norris MD   Stopped at 01/09/23 0825    metoprolol (LOPRESSOR) injection 2.5 mg  2.5 mg IntraVENous Q6H Edith Norris MD   2.5 mg at 01/08/23 2166     Technical Description: This is a 21-channel digital EEG recording with time-locked video. Electrodes were placed in accordance with the 10-20 International System of Electrode Placement. Single lead EKG monitoring was included. Baseline EEG Recording:  A formal baseline EEG recording was not obtained. Day 1 - 1/9/23, starting at 730 am    Interictal EEG Samples: In the alert state, the posterior background rhythm was a symmetric 5-6 Hz of 20-40 uV rhythm which reacted symmetrically to eye opening and had a disorganized frequency-amplitude gradient. Near continuous left anterior temporal sharp waves 40-50 Uv were seen occurring in periodic pattern at 1 Hz. The EKG channel revealed no abnormalities. Ictal EEG Recording / Patient Events: During this period the patient had no events or seizures. Summary: During this day of recording no events were recorded. The interictal EEG was abnormal due to diffuse polymorphic theta slowing suggesting moderate encephalopathy. Near continuous left anterior temporal sharp waves conferred an increased risk for focal onset seizures. Monitoring was continued in order to record the patient's typical events. The EKG channel revealed no abnormalities.     Aletha Hood MD  Diplomate, American Board of Psychiatry and Neurology  Diplomate, American Board of Clinical Neurophysiology  Diplomate, American Board of Epilepsy       Please note this is a preliminary report and updated daily. The final report will have a summary of behavior and electrographic findings with clinical correlation.

## 2023-01-10 NOTE — PLAN OF CARE
Problem: Discharge Planning  Goal: Discharge to home or other facility with appropriate resources  Outcome: Progressing     Problem: Chronic Conditions and Co-morbidities  Goal: Patient's chronic conditions and co-morbidity symptoms are monitored and maintained or improved  Outcome: Progressing     Problem: Safety - Medical Restraint  Goal: Remains free of injury from restraints (Restraint for Interference with Medical Device)  Description: INTERVENTIONS:  1. Determine that other, less restrictive measures have been tried or would not be effective before applying the restraint  2. Evaluate the patient's condition at the time of restraint application  3. Inform patient/family regarding the reason for restraint  4. Q2H: Monitor safety, psychosocial status, comfort, nutrition and hydration  Outcome: Progressing     Problem: Confusion  Goal: Confusion, delirium, dementia, or psychosis is improved or at baseline  Description: INTERVENTIONS:  1. Assess for possible contributors to thought disturbance, including medications, impaired vision or hearing, underlying metabolic abnormalities, dehydration, psychiatric diagnoses, and notify attending LIP  2. [place] high risk fall precautions, as indicated  3. Provide frequent short contacts to provide reality reorientation, refocusing and direction  4. Decrease environmental stimuli, including noise as appropriate  5. Monitor and intervene to maintain adequate nutrition, hydration, elimination, sleep and activity  6. If unable to ensure safety without constant attention obtain sitter and review sitter guidelines with assigned personnel  7. Initiate Psychosocial CNS and Spiritual Care consult, as indicated  Outcome: Progressing     Problem: Skin/Tissue Integrity  Goal: Absence of new skin breakdown  Description: 1. Monitor for areas of redness and/or skin breakdown  2. Assess vascular access sites hourly  3. Every 4-6 hours minimum:  Change oxygen saturation probe site  4. Every 4-6 hours:  If on nasal continuous positive airway pressure, respiratory therapy assess nares and determine need for appliance change or resting period.   Outcome: Progressing     Problem: Safety - Adult  Goal: Free from fall injury  Outcome: Progressing     Problem: ABCDS Injury Assessment  Goal: Absence of physical injury  Outcome: Progressing

## 2023-01-10 NOTE — PROGRESS NOTES
Endovascular Neurosurgery Progress Note    SUBJECTIVE:   Patient remained confused this am.     Review of Systems:  CONSTITUTIONAL:  negative for fevers, chills, fatigue and malaise    EYES:  negative for double vision, blurred vision and photophobia     HEENT:  negative for tinnitus, epistaxis and sore throat    RESPIRATORY:  negative for cough, shortness of breath, wheezing    CARDIOVASCULAR:  negative for chest pain, palpitations, syncope, edema    GASTROINTESTINAL:  negative for nausea, vomiting    GENITOURINARY:  negative for incontinence    MUSCULOSKELETAL:  negative for neck or back pain    NEUROLOGICAL:  Negative for weakness and tingling  negative for headaches and dizziness    PSYCHIATRIC:  negative for anxiety      Review of systems otherwise negative. OBJECTIVE:     Vitals:    01/10/23 0800   BP:    Pulse:    Resp:    Temp: 97.7 °F (36.5 °C)   SpO2:         General:  Gen: normal habitus, NAD  HEENT: NCAT, mucosa moist  Cvs: RRR, S1 S2 normal  Resp: symmetric unlabored breathing  Abd: s/nd/nt  Ext: no edema  Skin: no lesions seen, warm and dry    Neuro:  Gen: awake and alert, oriented x2. Not oriented time   Lang/speech: moderate to severe aphasia, perseverating   CN: PERRL, EOMI, VFF, V1-3 intact, mild right face droop, hearing intact, shoulder shrug symmetric, tongue midline  Motor: grossly 5/5 UE and LE on the left, right arm 4/5, right leg amputated above knee 1/5  Sense: decrease on the left  Coord: deferred  DTR: deferred  Gait: deferred    NIH Stroke Scale:   1a  Level of consciousness: 0 - alert; keenly responsive   1b. LOC questions:  0 - answers both questions correctly   1c. LOC commands: 0 - performs both tasks correctly   2. Best Gaze: 0 - normal   3. Visual: 0 - no visual loss   4. Facial Palsy: 1 - minor paralysis (flattened nasolabial fold, asymmetric on smiling)   5a. Motor left arm: 0 - no drift, limb holds 90 (or 45) degrees for full 10 seconds   5b.   Motor right arm: 2 - some effort against gravity, limb cannot get to or maintain (if cued) 90 (or 45) degrees, drifts down to bed, but has some effort against gravity    6a. Motor left le - no drift; leg holds 30 degree position for full 5 seconds   6b  Motor right leg:  3 - no effort against gravity; leg falls to bed immediately   7. Limb Ataxia: 0 - absent   8. Sensory: 1 - mild to moderate sensory loss; patient feels pinprick is less sharp or is dull on the affected side; there is a loss of superficial pain with pinprick but patient is aware of being touched    9. Best Language:  2 - severe aphasia; all communication is through fragmentary expression; great need for inference, questioning, and guessing by the listener. Range of information that can be exchanged is limited; listener carries burden of communication. Examiner cannot identify materials provided from patient response. 10. Dysarthria: 1 - mild to moderate, patient slurs at least some words and at worst, can be understood with some difficulty   11. Extinction and Inattention: 0 - no abnormality         Total:   10     MRS: 05      LABS:   Reviewed. Lab Results   Component Value Date    HGB 15.0 01/10/2023    WBC 14.6 (H) 01/10/2023     01/10/2023     01/10/2023    BUN 11 01/10/2023    CREATININE 0.69 (L) 01/10/2023    AST 16 2023    ALT 18 2023    MG 2.0 01/10/2023    APTT 35.9 (H) 01/10/2023    INR 1.1 2022      No results found for: COVID19    RADIOLOGY:   Images were personally reviewed including:   Cerebral angiogram 7/3/2019:  1. Right proximal immediately after the origin cervical ICA atherosclerotic changes resulting into a focal area of stenosis measuring approximately 65% per NASCET criteria. 2.  The above mentioned stenosis was treated with post balloon angioplasty and stenting using a 7-10x 30 mm Acculink stent with no significant residual stenosis. 3.  Right common femoral artery severe stenosis with good collaterals. CTA head neck 1/7/23: R ICA Acculink stent patent and L ICA chronic occlusion seen before 2019. Dominant R VA and chronically occluded L VA       MRI brain w/o contrast 1/8/23: punctate right temporal periventricular stroke. 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 Patient presented to the ED with AMS and found to have glucose of 620 and possible sepsis. Endovascular consulted for chronic L ICA and L VA occlusion and hx of R ICA stent in 2019. Neuro exam was stable  PLAN:   - no intervention, CTA is stable and L ICA and L VA occlusion are chronic and known before 2019  - R ICA stent is patent  - patient has a punctate right temporal periventricular stroke, he is currently already on aspirin, recommend continue it  - patient is on heparin for ACS, cardiology and ICU managing it   - PT OT and SLP       Case discussed with Dr. Trisha Leonardo attending.     Jose E Robertson MD  Stroke, [facility]  [id number]  Electronically signed 1/10/2023 at 8:32 AM

## 2023-01-10 NOTE — PROGRESS NOTES
INTENSIVE CARE UNIT  Resident Physician Progress Note    Patient - Niharika Norton  Date of Admission -  1/7/2023  4:22 PM  Date of Evaluation -  1/10/2023  Room and Bed Number -  5411/1954-69   Hospital Day - 3    HPI:     Michael Black is a 62 yo M with PMHx of PVD, right toe gangrene/osteomyelitis s/p AKA 2009, MV-CAD s/p PEA arrest, s/p CABG in 2019, s/p proximal R ICA stent, ICM with HFrEF 40%, T2DM, complete occlusion of L ICA and L vertebral artery and history of CVA with residual RUE deficits. Patient presented to the ED with altered mental status with last known well two days prior. Patient was found to be hypertensive with -250, tachycardic with HR 110s. Patient appeared disheveled. EKG showed ST elevation in V1-V3 with mild elevation in V4, cardiology was consulted, troponins were downtrending and they recommended heparin infusion. Patient glucose was 620 with beta hydroxy butyrate 2.43 and anion gap 26. Patient was started on insulin gtt. Initial Labs in ED:  Cr 1.22  Lactic 2.3  Glucose 620  BHB 2.43  Anion Gap 26  Bicarb 22     pH 7.35 / 52.3 / 28.8 / 52.6  LFT unremarkable  TSH normal  WBC 19.7  UA negative for UTI     CT head negative  CXR unremarkable     CTA HEAD AND NECK  1. 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 2.  Patent right cervical internal carotid artery stent demonstrates   mild-to-moderate in stent stenosis. Follow-up catheter angiogram may be   helpful for further evaluation. 3.  Multifocal areas of moderate to severe stenosis in the right middle   cerebral artery branches. 4.  Multifocal severe stenoses in the right anterior cerebral artery A2 and   A3 segments. 5.  Focal severe stenosis in the left subclavian artery secondary to   noncalcified atherosclerotic plaque at the level of the left vertebral artery   origin.      SUBJECTIVE:     OVERNIGHT EVENTS:    Stopped D5, patient started on NS @ 50 ml/hr    AWAKE & FOLLOWING COMMANDS:  [] No   [x] Yes - awake, repetitive speech pattern    SECRETIONS Amount:  [] Small [] Moderate  [] Large  [x] None  Color:     [] White [] Colored  [] Bloody    SEDATION:  RAAS Score:  [] Propofol gtt  [] Versed gtt  [] Ativan gtt   [x] No Sedation    PARALYZED:  [x] No    [] Yes    VASOPRESSORS:  [x] No    [] Yes  [] Levophed [] Dopamine [] Vasopressin  [] Dobutamine [] Phenylephrine [] Epinephrine      OBJECTIVE:     VITAL SIGNS:  /88   Pulse 66   Temp 98 °F (36.7 °C) (Oral)   Resp 23   Ht 5' 6\" (1.676 m)   Wt 149 lb 11.1 oz (67.9 kg)   SpO2 98%   BMI 24.91 kg/m²   Tmax over 24 hours:  Temp (24hrs), Av.7 °F (36.5 °C), Min:96.8 °F (36 °C), Max:98.1 °F (36.7 °C)      Patient Vitals for the past 8 hrs:   BP Temp Temp src Pulse Resp SpO2   01/10/23 0700 117/88 -- -- 66 23 98 %   01/10/23 0645 -- -- -- 64 17 95 %   01/10/23 0630 -- -- -- 58 29 94 %   01/10/23 0615 -- -- -- 72 16 97 %   01/10/23 0600 136/74 98 °F (36.7 °C) Oral 70 24 97 %   01/10/23 0545 -- -- -- 69 18 97 %   01/10/23 0530 -- -- -- 61 19 96 %   01/10/23 0515 -- -- -- 63 28 97 %   01/10/23 0500 135/78 -- -- 69 (!) 31 96 %   01/10/23 0445 -- -- -- 74 23 90 %   01/10/23 0430 -- -- -- 70 20 97 %   01/10/23 0415 -- -- -- 75 22 98 %   01/10/23 0400 (!) 121/49 98 °F (36.7 °C) Oral 72 29 90 %   01/10/23 0345 -- -- -- 74 21 100 %   01/10/23 0330 -- -- -- 70 24 96 %   01/10/23 0315 -- -- -- 67 30 94 %   01/10/23 0300 88/69 -- -- 70 23 97 %   01/10/23 0245 -- -- -- 56 26 95 %   01/10/23 0230 -- -- -- 65 26 97 %   01/10/23 0215 -- -- -- 66 19 96 %   01/10/23 0200 84/ 97.9 °F (36.6 °C) Oral 67 28 98 %   01/10/23 0145 -- -- -- 64 17 97 %   01/10/23 0130 -- -- -- 63 28 95 %   01/10/23 0115 -- -- -- 54 24 95 %   01/10/23 0100 87/75 -- -- 63 12 96 %   01/10/23 0045 -- -- -- 63 24 95 %   01/10/23 0030 -- -- -- (!) 49 22 93 %   01/10/23 0015 -- -- -- 67 14 97 %   01/10/23 0000 89/74 98.1 °F (36.7 °C) Oral 59 15 96 %   23 2345 -- -- -- 56 17 97 % Intake/Output Summary (Last 24 hours) at 1/10/2023 0739  Last data filed at 1/10/2023 0887  Gross per 24 hour   Intake 5148.68 ml   Output 600 ml   Net 4548.68 ml     Date 01/10/23 0000 - 01/10/23 2359   Shift 6556-4935 9098-8116 8717-2157 24 Hour Total   INTAKE   I.V.(mL/kg) 811(11.9)   811(11.9)   Shift Total(mL/kg) 811(11.9)   811(11.9)   OUTPUT   Urine(mL/kg/hr) 600   600   Shift Total(mL/kg) 600(8.8)   600(8.8)   Weight (kg) 67.9 67.9 67.9 67.9     Wt Readings from Last 3 Encounters:   01/09/23 149 lb 11.1 oz (67.9 kg)   01/08/23 190 lb (86.2 kg)   08/23/22 180 lb (81.6 kg)     Body mass index is 24.91 kg/m².         PHYSICAL EXAM:  GEN:                  Arousable, repetitive speech pattern, unable to answer questions  EYES:                pupils equal, round, and reactive to light  HEENT:            Normocephalic, without obvious abnormality  LUNGS:            good air exchange  CV:                     regular rate and rhythm  ABDOMEN:     soft and non-distended  MSK:                  R AKA  NEURO[de-identified]           Arousable, repetitive speech pattern, residual RUE deficit, able to grasp with left hand and wiggles left toes and R knee  EXTREMITIES:  distal pulses intact, R AKA      MEDICATIONS:  Scheduled Meds:   insulin glargine  5 Units SubCUTAneous Nightly    levETIRAcetam  500 mg Oral BID    insulin lispro  0-16 Units SubCUTAneous Q4H    famotidine (PEPCID) injection  20 mg IntraVENous BID    cefTRIAXone (ROCEPHIN) IV  1,000 mg IntraVENous Q24H    atorvastatin  40 mg Oral Nightly    aspirin  81 mg Oral Daily    therapeutic multivitamin-minerals  1 tablet Oral Daily with breakfast    sodium chloride flush  5-40 mL IntraVENous 2 times per day    thiamine and folic acid IVPB   IntraVENous Daily     Continuous Infusions:   sodium chloride 50 mL/hr at 01/10/23 0702    heparin (PORCINE) Infusion 18 Units/kg/hr (01/10/23 0702)    dexmedetomidine 0.6 mcg/kg/hr (01/10/23 0702)    dextrose      sodium chloride PRN Meds:   glucose, 4 tablet, PRN  dextrose bolus, 125 mL, PRN   Or  dextrose bolus, 250 mL, PRN  glucagon (rDNA), 1 mg, PRN  dextrose, , Continuous PRN  heparin (porcine), 4,000 Units, PRN  heparin (porcine), 2,000 Units, PRN  dextrose bolus, 125 mL, PRN   Or  dextrose bolus, 250 mL, PRN  potassium chloride, 10 mEq, PRN  magnesium sulfate, 1,000 mg, PRN  sodium phosphate IVPB, 10 mmol, PRN   Or  sodium phosphate IVPB, 15 mmol, PRN   Or  sodium phosphate IVPB, 20 mmol, PRN  sodium chloride flush, 5-40 mL, PRN  sodium chloride, , PRN  ondansetron, 4 mg, Q8H PRN   Or  ondansetron, 4 mg, Q6H PRN  polyethylene glycol, 17 g, Daily PRN  acetaminophen, 650 mg, Q6H PRN   Or  acetaminophen, 650 mg, Q6H PRN        SUPPORT DEVICES: [] Ventilator [] BIPAP  [] Nasal Cannula [x] Room Air      DATA:  Complete Blood Count:   Recent Labs     01/08/23  0237 01/09/23  0458 01/10/23  0430   WBC 22.6* 15.8* 14.6*   RBC 5.11 4.42 4.98   HGB 15.3 12.9* 15.0   HCT 44.5 40.2* 46.6   MCV 87.1 91.0 93.6   MCH 29.9 29.2 30.1   MCHC 34.4 32.1 32.2   RDW 12.6 13.0 13.1    223 222   MPV 11.3 11.1 12.0        Last 3 Blood Glucose:   Recent Labs     01/08/23  0901 01/08/23  1200 01/08/23  1739 01/08/23  1919 01/09/23  0002 01/09/23  0458 01/09/23  0924 01/10/23  0430   GLUCOSE 145* 262* 80 99 176* 165* 75 155*        PT/INR:    Lab Results   Component Value Date/Time    PROTIME 13.7 08/23/2022 11:39 PM    INR 1.1 08/23/2022 11:39 PM     PTT:    Lab Results   Component Value Date/Time    APTT 35.9 01/10/2023 04:30 AM       Comprehensive Metabolic Profile:   Recent Labs     01/07/23  1646 01/07/23  2324 01/09/23  0458 01/09/23  0924 01/10/23  0430   *   < > 145* 140 137   K 3.8   < > 4.0 4.0 3.8   CL 86*   < > 109* 103 104   CO2 22   < > 21 20 24   BUN 27*   < > 16 16 11   CREATININE 1.22*   < > 0.79 0.76 0.69*   GLUCOSE 620*   < > 165* 75 155*   CALCIUM 9.3   < > 8.4* 8.1* 8.5*   PROT 7.2  --   --   --   --    LABALBU 3.3*  --   --   --   --    BILITOT 0.4  --   --   --   --    ALKPHOS 125  --   --   --   --    AST 16  --   --   --   --    ALT 18  --   --   --   --     < > = values in this interval not displayed.      Magnesium:   Lab Results   Component Value Date/Time    MG 2.0 01/10/2023 04:30 AM    MG 2.1 01/09/2023 09:24 AM    MG 2.2 01/09/2023 04:58 AM     Phosphorus:   Lab Results   Component Value Date/Time    PHOS 2.3 01/10/2023 04:30 AM    PHOS 3.2 01/09/2023 09:24 AM    PHOS 2.4 01/09/2023 04:58 AM     Ionized Calcium:   Lab Results   Component Value Date/Time    CAION 1.13 07/04/2019 04:40 AM    CAION 0.97 04/09/2019 12:21 AM    CAION 1.01 04/07/2019 07:47 PM        Urinalysis:   Lab Results   Component Value Date/Time    NITRU NEGATIVE 01/07/2023 05:08 PM    COLORU Yellow 01/07/2023 05:08 PM    PHUR 5.5 01/07/2023 05:08 PM    WBCUA 0 TO 2 01/07/2023 05:08 PM    RBCUA 0 TO 2 01/07/2023 05:08 PM    MUCUS NOT REPORTED 04/14/2019 01:01 AM    TRICHOMONAS NOT REPORTED 04/14/2019 01:01 AM    YEAST NOT REPORTED 04/14/2019 01:01 AM    BACTERIA NOT REPORTED 04/14/2019 01:01 AM    SPECGRAV 1.036 01/07/2023 05:08 PM    LEUKOCYTESUR NEGATIVE 01/07/2023 05:08 PM    UROBILINOGEN Normal 01/07/2023 05:08 PM    BILIRUBINUR NEGATIVE 01/07/2023 05:08 PM    GLUCOSEU 3+ 01/07/2023 05:08 PM    KETUA SMALL 01/07/2023 05:08 PM    AMORPHOUS NOT REPORTED 04/14/2019 01:01 AM       HgBA1c:    Lab Results   Component Value Date/Time    LABA1C 12.7 01/09/2023 04:32 PM     TSH:    Lab Results   Component Value Date/Time    TSH 0.45 01/07/2023 11:24 PM     Lactic Acid: No results found for: LACTA   Troponin: No results for input(s): TROPONINI in the last 72 hours.    ASSESSMENT:     Patient Active Problem List    Diagnosis Date Noted    Stenosis of right middle cerebral artery 01/09/2023    At risk of seizures 01/09/2023    Cardiomyopathy, ischemic 01/09/2023    Acute ischemic right MCA stroke (HCC) 01/09/2023    Altered mental status 01/07/2023    DKA, type 2,  not at goal [facility]) 01/07/2023    Pressure injury of right heel, unstageable ([facility] [address] 12/10/2020    Hx of AKA (above knee amputation), right ([facility] [address] 12/10/2020    Erectile dysfunction associated with type 2 diabetes mellitus ([facility] [address] 12/10/2020    Above knee amputation of right lower extremity ([facility] [address] 09/28/2019    Coronary artery disease 08/23/2019    Ischemic gangrene ([facility] [address]     Pyogenic inflammation of bone (HCC)     Status post angiography of extremity 08/20/2019    Leukocytosis     Right-sided extracranial carotid artery stenosis 07/03/2019    Essential hypertension     Type 2 diabetes mellitus with diabetic neuropathy, without long-term current use of insulin ([facility])     Cellulitis of right lower extremity     Moderate malnutrition ([facility] [address] 04/26/2019    Acute CVA (cerebrovascular accident) ([facility] [address] 04/25/2019    Thrombocytosis     Right foot drop     Carotid stenosis, bilateral     Acute cerebral infarction associated with systemic hypoxia or ischemia ([facility])     Multi infarct state     Hepatitis     Acute metabolic encephalopathy     Abnormal CT of the head     PAD (peripheral artery disease) ([facility] [address] 04/07/2019    Subacute osteomyelitis of right tibia [facility])           PLAN:     Neuro  Arousable, repetitive speech pattern, not following commands  Precedex gtt 0.6  Neurology following  CTA head/neck 1/6 showing severe stenosis in L subclavian artery, R TOAN, MCA, ICA and vertebral arteries  MRI brain 1/8 - punctate R temporal periventricular stroke  On ASA and Lipitor  Neuroendovascular - continue ASA for punctate ischemic stroke - stenoses are chronic and unchanged since 2019, no intervention at this time  LTME 1/8 - moderate encephalopathy, left anterior temporal sharps suggesting underlying lesion or dysfunction, increased risk for focal seizures   Modified barium swallow 1/9 for mildly thickened liquids  Thiamine and folic acid given in ED     Cardiology  Hx CABG, R ICA stent, vertebral artery stenosis, TOAN MCA and ICA stenosis  EKG in ED showing ST elevation in V1-V3  Cardiology consulted  Heparin gtt  Troponin 30-25-30  -250 in ED - initially on cardene gtt, now off cardene gtt  Lopressor 2.5 q 6h     Pulmonary  RA  CXR unremarkable     Renal  Cr 0.69, improving   U out 600+ 1 unmeasured  NS @ 50 ml/hr     GI  Modified barium swallow 1/9 passed for mildly thickened liquids  Diet   Pepcid 20 BID     ID  WBC 14.6, improving  Ceftriaxone  Blood cultures no growth x 1 day     Endo  Glucose 155  Insulin gtt discontinued  HDISS q 4 h  NS @ 50 ml/hr    Brittany Baig M.D. Emergency Medicine Resident, PGY-2  1/10/2023 7:39 AM      Attending Physician Statement  I have discussed the care of Minh Monaco, including pertinent history and exam findings with the resident. I have reviewed the key elements of all parts of the encounter with the resident. I have seen and examined the patient with the resident. I agree with the assessment and plan and status of the problem list as documented. I seen the patient during around today, chart reviewed, labs and medications reviewed overnight events noted. Patient is currently on Precedex drip 0.4 mcg he was off Precedex for few hours yesterday morning but had to be put back on Precedex drip as he was agitated but remain confused and disoriented but less so as compared to yesterday. Has been followed by neurology Vimpat is added to Keppra because of EEG findings with temporal spike. Urine output is 600 mL but incontinence was also noted. He is D5 half saline which was changed to saline his oral intake is okay in had swallow evaluation done yesterday and no aspiration was noted per nursing staff. Repeat echocardiogram showed ejection fraction of 35 to 40% with LV thrombus. Continue with NSTEMI follow-up with neurology continue antiepileptic drug. On aspirin and statin. Will continue to follow neurological status and mentation and will try to wean down.   Heparin drip to continue      Discussed with nursing staff, treatment and plan discussed. Discussed with respiratory therapist.    Total critical care time caring for this patient with life threatening, unstable organ failure, including direct patient contact, management of life support systems, review of data including imaging and labs, discussions with other team members and physicians at least 35 min so far today, excluding procedures. Please note that this chart was generated using voice recognition Dragon dictation software. Although every effort was made to ensure the accuracy of this automated transcription, some errors in transcription may have occurred.      Brit Andrews MD  1/10/2023 1:07 PM

## 2023-01-10 NOTE — PLAN OF CARE
Problem: Discharge Planning  Goal: Discharge to home or other facility with appropriate resources  1/10/2023 0859 by Catrachita Steve RN  Outcome: Progressing  1/10/2023 0155 by Jean Carlos Moreno RN  Outcome: Progressing     Problem: Chronic Conditions and Co-morbidities  Goal: Patient's chronic conditions and co-morbidity symptoms are monitored and maintained or improved  1/10/2023 0859 by Catrachita Steve RN  Outcome: Progressing  1/10/2023 0155 by Jean Carlos Moreno RN  Outcome: Progressing     Problem: Safety - Medical Restraint  Goal: Remains free of injury from restraints (Restraint for Interference with Medical Device)  Description: INTERVENTIONS:  1. Determine that other, less restrictive measures have been tried or would not be effective before applying the restraint  2. Evaluate the patient's condition at the time of restraint application  3. Inform patient/family regarding the reason for restraint  4. Q2H: Monitor safety, psychosocial status, comfort, nutrition and hydration  1/10/2023 0859 by Catrachita Steve RN  Outcome: Progressing  1/10/2023 0155 by Jean Carlos Moreno RN  Outcome: Progressing     Problem: Confusion  Goal: Confusion, delirium, dementia, or psychosis is improved or at baseline  Description: INTERVENTIONS:  1. Assess for possible contributors to thought disturbance, including medications, impaired vision or hearing, underlying metabolic abnormalities, dehydration, psychiatric diagnoses, and notify attending LIP  2. [place] high risk fall precautions, as indicated  3. Provide frequent short contacts to provide reality reorientation, refocusing and direction  4. Decrease environmental stimuli, including noise as appropriate  5. Monitor and intervene to maintain adequate nutrition, hydration, elimination, sleep and activity  6. If unable to ensure safety without constant attention obtain sitter and review sitter guidelines with assigned personnel  7.  Initiate Psychosocial CNS and Spiritual Care consult, as indicated  1/10/2023 0859 by Roxi Fuller RN  Outcome: Progressing  1/10/2023 0155 by Melani Chadwick RN  Outcome: Progressing     Problem: Skin/Tissue Integrity  Goal: Absence of new skin breakdown  Description: 1. Monitor for areas of redness and/or skin breakdown  2. Assess vascular access sites hourly  3. Every 4-6 hours minimum:  Change oxygen saturation probe site  4. Every 4-6 hours:  If on nasal continuous positive airway pressure, respiratory therapy assess nares and determine need for appliance change or resting period.   1/10/2023 0859 by Roxi Fuller RN  Outcome: Progressing  1/10/2023 0155 by Melani Chadwick RN  Outcome: Progressing     Problem: Safety - Adult  Goal: Free from fall injury  1/10/2023 0155 by Melani Chadwick RN  Outcome: Progressing     Problem: ABCDS Injury Assessment  Goal: Absence of physical injury  1/10/2023 0155 by Melani Chadwick RN  Outcome: Progressing

## 2023-01-10 NOTE — PROGRESS NOTES
[facility] Neurology   [address] [facility]    Progress Note             Date:   1/10/2023  Patient name:  Marice Severin  Date of admission:  1/7/2023  4:22 PM  MRN:   [id number]  Account:  [de-identified]  YOB: 1965  PCP:    Lea Gallego MD  Room:   [address] [place] [address]  Code Status:    Full Code    Chief Complaint:     Chief Complaint   Patient presents with    Altered Mental Status       Interval hx: The patient was seen and examined at bedside. Is vitally stable, alert and oriented x 2. Was agitated overnight, was pulling lines, increased precedex to 0.6  Patient opens eye to touch, does follow simple commands   Not oriented. Subacute right infarct on MRI brain wo   EEG with left temporal LPD's  Day 1 LTME near continues left temporal sharps       Brief History of Present Illness: This is a 59-year-old male HTN, PVD, right AKA 2019, DM, CAD S/P CABG, right ICA s/p stent ischemic gangrene, severe, hepatitis, was brought into ED for management of altered mental status. Patient last known well was on Thursday evening. Apparently patient was found on the floor in feces with altered consciousness. Was found by EMS unresponsive and nonverbal, hyperglycemia initially was tachycardic, hypertensive. CT head done on 1/7 was unremarkable for acute abnormalities. CTA head and neck showing chronic occlusion of left ICA and vertebral arteries. Multi iCAD disease. Endovascular were consulted with no interventions. Patient was found to have elevated troponin and is on heparin for NSTEMI. MRI brain showing tiny acute to subacute infarct of the posterior medial right temporal periventricular white matter. Encephalomalacia of the bilateral frontal and parietal lobes likely old infarcts, old lacunar infarct of the right cerebellar hemisphere and right thalamus.   EEG was obtained showing mild to moderate nonspecific encephalopathy, continuous 1 Hz left anterior temporal lobe lateralized periodic discharges. Was loaded with keppra 2 gram and continues on 500 mg bid (1/9)  Day 1 LTME, has nearly continues left temporal sharp waves along with moderate encephalopathy    Past Medical History:     Past Medical History:   Diagnosis Date    CAD (coronary artery disease) 04/07/2019    triple bypass    Cardiac arrest ([facility] [address] 04/07/2019    Diabetes mellitus ([facility] 04/2019    ON RX    Hyperlipidemia 04/2019    ON RX HAS NOT HAD SINCE 06/30/2019    Hypertension 04/2019    ON RX NONE SINCE 06/2019    Osteolysis 04/07/2019    R Knee hardware    PEA (Pulseless electrical activity) ([facility] 04/07/2019    PVD (peripheral vascular disease) ([facility] 04/07/2019    Wound, open 09/2019    RIGHT STUMP DRAINING MOD AMT BLOODY DRAINAGE        Past Surgical History:     Past Surgical History:   Procedure Laterality Date    ABOVE KNEE AMPUTATION Right 09/28/2019    AMPUTATION ABOVE KNEE Right 9/28/2019    RIGHT ABOVE KNEE AMPUTATION performed by Rosalina Nissen, MD at [address] [place] [address]  04/07/2019    MVD-Stv    CAROTID STENT PLACEMENT  07/03/2019    ACCULINK CAROTID STENT PLACED, MRI CONDITIONAL 5, 3T OK.      CORONARY ARTERY BYPASS GRAFT  04/07/2019    x4 per Dr. Bon Dukes, [address] GRAFT N/A 4/7/2019    EMERGENT CPR,  CORONARY ARTERY BYPASS X4, ON PUMP ,CHEST LEFT OPEN; SWAN, ANGELA PER ANESTHESIA performed by Dima Pizano MD at [facility]      unsure if correct known tibial surgery    HARDWARE REMOVAL Right 04/07/2019    R Knee internal screw protruding through skin    HARDWARE REMOVAL Right 04/22/2019    HARDWARE REMOVAL RIGHT TIBIA,    LEG AMPUTATION BELOW KNEE Right 8/25/2019    RIGHT FOOT GUILLOTINE AMPUTATION performed by Rosalina Nissen, MD at [place] [address] Right 8/27/2019    RIGHT BELOW KNEE AMPUTATION FORMALIZATION performed by Rosalina Nissen, MD at [address] [facility] N/A 4/8/2019    STERNAL  WOUND WASHOUT AND CLOSURE S/P CABG performed by Nandini Llanes MD at [address] [place] [address] N/A 4/22/2019    HARDWARE REMOVAL RIGHT TIBIA, IRRIGATION AND DEBRIDEMENT [address] [facility] performed by Niels Lundy MD at [facility] Right 2000    Elwanda Silversmith off roof, external fixation and internal hardware    TONSILLECTOMY  1974        Medications Prior to Admission:     Prior to Admission medications    Medication Sig Start Date End Date Taking? Authorizing Provider   metoprolol tartrate (LOPRESSOR) 25 MG tablet Take 2 tablets by mouth 2 times daily 8/24/22   Mary Jo Florez MD   clopidogrel (PLAVIX) 75 MG tablet TAKE 1 TABLET BY MOUTH DAILY 2/17/22   Benigno Can MD   gabapentin (NEURONTIN) 300 MG capsule TAKE 1 CAPSULE BY MOUTH EVERY 8 HOURS 2/17/22 3/19/22  Benigno Can MD   metFORMIN (GLUCOPHAGE) 1000 MG tablet Take 1 tablet by mouth 2 times daily (with meals) 1/13/22 3/14/22  Benigno Can MD   insulin lispro, 1 Unit Dial, (HUMALOG KWIKPEN) 100 UNIT/ML SOPN INJECT SUBCUTANEOUSLY BEFORE MEALS AS DIRECTED PER SLIDING SCALE: SEE ATTACHED FOR SLIDING SCALE DIRECTIONS: Hold all insulin and notify Dr immediately if BG <60. -200: 2 UNITS; -250: 4UNITS; 251-300 6 UNITS; 301-350: 8 UNITS  Patient not taking: Reported on 1/13/2022 1/5/22   Benigno Can MD   lisinopril-hydroCHLOROthiazide (PRINZIDE;ZESTORETIC) 20-25 MG per tablet Take 2 tablets by mouth daily .   Take 1 pill if blood pressure is less than 130/80 12/23/21   Benigno Can MD   insulin glargine (LANTUS SOLOSTAR) 100 UNIT/ML injection pen Inject 24 Units into the skin nightly 11/9/21 1/13/22  Justo Hunt MD   Insulin Syringe-Needle U-100 30G X 5/16\" 1 ML MISC 1 each by Does not apply route daily 11/9/21   Justo Hunt MD   atorvastatin (LIPITOR) 40 MG tablet Take 1 tablet by mouth nightly 10/19/21   Jacque Foote MD   metoprolol tartrate (LOPRESSOR) 50 MG tablet Take 1 tablet by mouth 2 times daily 10/19/21   Chelsea Colunga MD   blood glucose test strips (ONETOUCH VERIO) strip TEST 2 TIMES A DAY & AS NEEDED FOR SYMPTOMS OF IRREGULAR BLOOD GLUCOSE. 5/28/21   Aldo Winter MD   Alcohol Swabs (B-D SINGLE USE SWABS REGULAR) PADS USE AS DIRECTED WITH TESTING SUPPLIES 5/28/21   Aldo Winter MD   Lancets (ONETOUCH DELICA PLUS [id number]) MISC USE AS DIRECTED TO TEST BLOOD SUGAR 5/28/21   Aldo Winter MD   sildenafil (VIAGRA) 100 MG tablet Take 1 tablet by mouth as needed for Erectile Dysfunction 1/7/21   Aldo Winter MD   Blood Glucose Monitoring Suppl (ONE TOUCH ULTRA 2) w/Device KIT U UTD ONCE D 9/27/19   Historical Provider, MD   acetaminophen (TYLENOL) 500 MG tablet Take 2 tablets by mouth every 8 hours as needed for Pain 9/30/19   Sanjiv Shook DO   [facility] [address] [facility] by Does not apply route For 5 years 9/27/19   Aldo Winter MD   Blood Glucose Monitoring Suppl (ACURA BLOOD GLUCOSE METER) w/Device KIT 1 kit by Does not apply route once for 1 dose 9/27/19 9/27/19  Aldo Winter MD   Insulin Pen Needle 32G X 6 MM MISC 1 each by Does not apply route daily 9/27/19   Aldo Winter MD   furosemide (LASIX) 20 MG tablet Take 1 tablet by mouth daily  Patient not taking: Reported on 11/16/2021 7/4/19   Mirlande Perez, ISAAC - CNP   aspirin 81 MG chewable tablet Take 1 tablet by mouth daily 5/10/19   Ana Laura Meek MD   Multiple Vitamins-Minerals (THERAPEUTIC MULTIVITAMIN-MINERALS) tablet Take 1 tablet by mouth daily (with breakfast) 5/10/19   Ana Laura Meek MD        Allergies:     Patient has no known allergies. Social History:     Tobacco:    reports that he has been smoking cigarettes. He started smoking about 2 years ago. He has a 20.00 pack-year smoking history. He has never used smokeless tobacco.  Alcohol:      reports no history of alcohol use. Drug Use:  reports no history of drug use.     Family History:     Family History   Problem Relation Age of Onset    Heart Disease Mother         CAD WITH STENT    Diabetes Mother     Stroke Mother     Diabetes Father     Stroke Father     Heart Disease Father         CAD    High Blood Pressure Father     High Blood Pressure Sister     Diabetes Brother     Heart Disease Brother     High Blood Pressure Brother        Review of Systems:     Review of Systems Unable due to altered mental status     Physical Exam:   BP (!) 136/103   Pulse 77   Temp 97.7 °F (36.5 °C) (Axillary)   Resp 19   Ht 5' 6\" (1.676 m)   Wt 149 lb 11.1 oz (67.9 kg)   SpO2 94%   BMI 24.91 kg/m²   Temp (24hrs), Av.7 °F (36.5 °C), Min:96.8 °F (36 °C), Max:98.1 °F (36.7 °C)    Recent Labs     23  1922 23  2324 01/10/23  0345 01/10/23  0732   POCGLU 198* 236* 204* 298*         Intake/Output Summary (Last 24 hours) at 1/10/2023 0925  Last data filed at 1/10/2023 0800  Gross per 24 hour   Intake 5148.68 ml   Output 925 ml   Net 4223.68 ml           Neurologic Exam     GENERAL  Appears comfortable and in no distress   HEENT  NC/ AT   HEART  S1 and S2 heard; palpation of pulses: radial pulse    NECK  Supple and no bruits heard   MENTAL STATUS: Opens eyes spontaneously, follows simple commands, not oriented to time. aphasia present, not answering questions.    CRANIAL NERVES: II     -      Visual fields intact to confrontation  III,IV,VI -  PERR, EOMs full, no ptosis  V     -     Normal facial sensation   VII    -     Normal facial symmetry  VIII   -     Intact hearing   IX,X -     Symmetrical palate  XI    -     Symmetrical shoulder shrug  XII   -     Midline tongue, no atrophy    MOTOR FUNCTION: RUE: Significant for good strength of grade 4+/5 in proximal and distal muscle groups   LUE: Significant for good strength of grade 5/5 in proximal and distal muscle groups   RLE: Amputated  LLE: Significant for good strength of grade 5/5 in proximal and distal muscle groups      Normal bulk, normal tone and no involuntary movements, no tremor SENSORY FUNCTION:  Normal touch, normal pinprick, normal vibration, normal proprioception   CEREBELLAR FUNCTION:  Intact fine motor control over upper limbs and lower limbs   REFLEX FUNCTION:  Symmetric in upper and lower extremities, no Babinski sign   STATION and GAIT Not examined       Investigations:      Laboratory Testing:  Recent Results (from the past 24 hour(s))   EKG 12 Lead    Collection Time: 01/09/23 12:11 PM   Result Value Ref Range    Ventricular Rate 71 BPM    Atrial Rate 71 BPM    P-R Interval 174 ms    QRS Duration 96 ms    Q-T Interval 454 ms    QTc Calculation (Bazett) 493 ms    P Axis 56 degrees    R Axis 50 degrees    T Axis -155 degrees   POC Glucose Fingerstick    Collection Time: 01/09/23 12:16 PM   Result Value Ref Range    POC Glucose 175 (H) 75 - 110 mg/dL   APTT    Collection Time: 01/09/23  2:21 PM   Result Value Ref Range    PTT 73.4 (H) 20.5 - 30.5 sec   POC Glucose Fingerstick    Collection Time: 01/09/23  4:01 PM   Result Value Ref Range    POC Glucose 137 (H) 75 - 110 mg/dL   Hemoglobin A1C    Collection Time: 01/09/23  4:32 PM   Result Value Ref Range    Hemoglobin A1C 12.7 (H) 4.0 - 6.0 %    Estimated Avg Glucose 318 mg/dL   Lipid Panel    Collection Time: 01/09/23  4:32 PM   Result Value Ref Range    Cholesterol 114 <200 mg/dL    HDL 26 (L) >40 mg/dL    LDL Cholesterol 61 0 - 130 mg/dL    Chol/HDL Ratio 4.4 <5    Triglycerides 135 <150 mg/dL   POC Glucose Fingerstick    Collection Time: 01/09/23  7:22 PM   Result Value Ref Range    POC Glucose 198 (H) 75 - 110 mg/dL   APTT    Collection Time: 01/09/23  9:30 PM   Result Value Ref Range    PTT 59.5 (H) 20.5 - 30.5 sec   POC Glucose Fingerstick    Collection Time: 01/09/23 11:24 PM   Result Value Ref Range    POC Glucose 236 (H) 75 - 110 mg/dL   POC Glucose Fingerstick    Collection Time: 01/10/23  3:45 AM   Result Value Ref Range    POC Glucose 204 (H) 75 - 110 mg/dL   CBC with Auto Differential    Collection Time: 01/10/23 4:30 AM   Result Value Ref Range    WBC 14.6 (H) 3.5 - 11.3 k/uL    RBC 4.98 4.21 - 5.77 m/uL    Hemoglobin 15.0 13.0 - 17.0 g/dL    Hematocrit 46.6 40.7 - 50.3 %    MCV 93.6 82.6 - 102.9 fL    MCH 30.1 25.2 - 33.5 pg    MCHC 32.2 28.4 - 34.8 g/dL    RDW 13.1 11.8 - 14.4 %    Platelets 222 138 - 453 k/uL    MPV 12.0 8.1 - 13.5 fL    NRBC Automated 0.0 0.0 per 100 WBC    Immature Granulocytes 1 (H) 0 %    Seg Neutrophils 72 (H) 36 - 65 %    Lymphocytes 19 (L) 24 - 43 %    Monocytes 7 3 - 12 %    Eosinophils % 1 1 - 4 %    Basophils 0 0 - 2 %    Absolute Immature Granulocyte 0.15 0.00 - 0.30 k/uL    Segs Absolute 10.51 (H) 1.50 - 8.10 k/uL    Absolute Lymph # 2.77 1.10 - 3.70 k/uL    Absolute Mono # 1.02 0.10 - 1.20 k/uL    Absolute Eos # 0.15 0.00 - 0.44 k/uL    Basophils Absolute 0.00 0.00 - 0.20 k/uL    Morphology Normal    Basic Metabolic Panel    Collection Time: 01/10/23  4:30 AM   Result Value Ref Range    Glucose 155 (H) 70 - 99 mg/dL    BUN 11 6 - 20 mg/dL    Creatinine 0.69 (L) 0.70 - 1.20 mg/dL    Est, Glom Filt Rate >60 >60 mL/min/1.73m2    Calcium 8.5 (L) 8.6 - 10.4 mg/dL    Sodium 137 135 - 144 mmol/L    Potassium 3.8 3.7 - 5.3 mmol/L    Chloride 104 98 - 107 mmol/L    CO2 24 20 - 31 mmol/L    Anion Gap 9 9 - 17 mmol/L   Magnesium    Collection Time: 01/10/23  4:30 AM   Result Value Ref Range    Magnesium 2.0 1.6 - 2.6 mg/dL   Phosphorus    Collection Time: 01/10/23  4:30 AM   Result Value Ref Range    Phosphorus 2.3 (L) 2.5 - 4.5 mg/dL   APTT    Collection Time: 01/10/23  4:30 AM   Result Value Ref Range    PTT 35.9 (H) 20.5 - 30.5 sec   POC Glucose Fingerstick    Collection Time: 01/10/23  7:32 AM   Result Value Ref Range    POC Glucose 298 (H) 75 - 110 mg/dL     Recent Labs     01/10/23  0430   WBC 14.6*   RBC 4.98   HGB 15.0   HCT 46.6   MCV 93.6   MCH 30.1   MCHC 32.2   RDW 13.1      MPV 12.0       Recent Labs     01/07/23  1646 01/07/23  2324 01/10/23  0430   *   < > 137   K 3.8   < >  3.8   CL 86*   < > 104   CO2 22   < > 24   BUN 27*   < > 11   CREATININE 1.22*   < > 0.69*   GLUCOSE 620*   < > 155*   CALCIUM 9.3   < > 8.5*   PROT 7.2  --   --    LABALBU 3.3*  --   --    BILITOT 0.4  --   --    ALKPHOS 125  --   --    AST 16  --   --    ALT 18  --   --     < > = values in this interval not displayed. Hemoglobin A1C   Date Value Ref Range Status   01/09/2023 12.7 (H) 4.0 - 6.0 % Final     Imaging:    Brain MRI wo contrast           Assessment :      Primary Problem  Altered mental status    Active Hospital Problems    Diagnosis Date Noted    Stenosis of right middle cerebral artery [I66.01] 01/09/2023     Priority: Medium    At risk of seizures [Z91.89] 01/09/2023     Priority: Medium    Cardiomyopathy, ischemic [I25.5] 01/09/2023     Priority: Medium    Acute ischemic right MCA stroke [facility]) [I63.511] 01/09/2023     Priority: Medium    Altered mental status [R41.82] 01/07/2023     Priority: Medium    DKA, type 2, not at goal [facility]) [E11.10] 01/07/2023     Priority: Medium    Acute metabolic encephalopathy [T82.48]        Past medical history of HTN, PVD, right AKA 2019, DM, CAD s/p CABG, right ICA s/p stenting, ischemic gangrene, presented after altered mental status, was found on the floor in feces. CT head with no acute abnormalities, showing encephalopathy bifrontal areas. CTA head and neck showing multiple iCAD disease. Elevated troponin is on heparin for NSTEMI. MRI brain showing subacute infarct of the right temporal white matter along with encephalomalacia of bilateral frontal and parietal lobes. EEG showing nonspecific encephalopathy and left temporal LPD's.   Impression: Ischemic stroke, possible seizure as a sequelae of old stroke, dysphagia, hyperglycemia provoked seizures     Plan:     Loaded with Keppra 2 g IV once  Will increase keppra to 1 gm bid   Might need to add a second AED like vimpat if continues to be altered   Continue with heparin for NSTEMI  ASA 81 mg   Lipitor 40 mg  Lipid panel, A1c   Echo completed today pending results      Follow-up further recommendations after discussing the case with attending  The plan was discussed with the patient, patient's family and the medical staff. Consultations:   IP CONSULT TO CARDIOLOGY  IP CONSULT TO NEUROLOGY  IP CONSULT TO HOSPITALIST  IP CONSULT TO FAMILY MEDICINE  IP CONSULT TO ENDOVASCULAR NEUROSURGERY  IP CONSULT TO CRITICAL CARE  IP CONSULT TO DIABETES EDUCATOR    Patient is admitted as inpatient status because of co-morbidities listed above, severity of signs and symptoms as outlined, requirement for current medical therapies and most importantly because of direct risk to patient if care not provided in a hospital setting.     Shiva Andrade MD  Neurology Resident PGY-2  1/10/2023  9:25 AM    Copy sent to Dr. Lea Gallego MD

## 2023-01-10 NOTE — PROCEDURES
LONG-TERM EEG-VIDEO [id number]    Patient: Angy Pope  Age: 61 y.o. MRN: [id number]    Referring Physician: No ref. provider found  History: The patient is a 61 y.o. male who presented breakthrough seizure/encephalopathy. This long-term video-EEG monitoring study was performed to determine the nature of the patient's clinical events. The patient is on neuroactive medications.    Dash Mehta   Current Facility-Administered Medications   Medication Dose Route Frequency Provider Last Rate Last Admin    0.9 % sodium chloride infusion   IntraVENous Continuous Cliff Kapadia MD 50 mL/hr at 01/10/23 0702 Rate Verify at 01/10/23 0702    insulin glargine (LANTUS) injection vial 5 Units  5 Units SubCUTAneous Nightly Isiah Kitchen MD   5 Units at 01/09/23 2023    levETIRAcetam (KEPPRA) 100 MG/ML solution 500 mg  500 mg Oral BID Isiah Kitchen MD   500 mg at 01/10/23 0816    heparin 25,000 units in dextrose 5 % 250 mL infusion (rate based)  5-30 Units/kg/hr IntraVENous Continuous Tosin De Leon MD 12.2 mL/hr at 01/10/23 0702 18 Units/kg/hr at 01/10/23 0702    dexmedetomidine (PRECEDEX) 400 mcg in sodium chloride 0.9 % 100 mL infusion  0.1-1.5 mcg/kg/hr IntraVENous Continuous Tosin De Leon MD 10.2 mL/hr at 01/10/23 0702 0.6 mcg/kg/hr at 01/10/23 0702    glucose chewable tablet 16 g  4 tablet Oral PRN Isiah Kitchen MD        dextrose bolus 10% 125 mL  125 mL IntraVENous PRN Isiah Kitchen MD        Or    dextrose bolus 10% 250 mL  250 mL IntraVENous PRN Isiah Kitchen MD        glucagon (rDNA) injection 1 mg  1 mg SubCUTAneous PRN Isiah Kitchen MD        dextrose 10 % infusion   IntraVENous Continuous PRN Isiah Kitchen MD        insulin lispro (HUMALOG) injection vial 0-16 Units  0-16 Units SubCUTAneous Q4H Isiah Kitchen MD   8 Units at 01/10/23 0830    famotidine (PEPCID) 20 mg in sodium chloride (PF) 0.9 % 10 mL injection  20 mg IntraVENous BID Sahra Barrientos MD   20 mg at 01/10/23 0816    cefTRIAXone (ROCEPHIN) 1,000 mg in sterile water 10 mL IV syringe  1,000 mg IntraVENous Q24H Sahra Barrientos MD   1,000 mg at 01/10/23 0816    heparin (porcine) injection 4,000 Units  4,000 Units IntraVENous PRN Jesus Barr MD   4,000 Units at 01/08/23 0357    heparin (porcine) injection 2,000 Units  2,000 Units IntraVENous PRN Jesus Barr MD   2,000 Units at 01/10/23 0642    atorvastatin (LIPITOR) tablet 40 mg  40 mg Oral Nightly Jesus Barr MD   40 mg at 01/09/23 1930    aspirin chewable tablet 81 mg  81 mg Oral Daily Jesus Barr MD   81 mg at 01/10/23 0816    therapeutic multivitamin-minerals 1 tablet  1 tablet Oral Daily with breakfast Jesus Barr MD   1 tablet at 01/10/23 0816    dextrose bolus 10% 125 mL  125 mL IntraVENous PRN Jesus Barr MD        Or    dextrose bolus 10% 250 mL  250 mL IntraVENous PRN Jesus Barr MD        potassium chloride 10 mEq/100 mL IVPB (Peripheral Line)  10 mEq IntraVENous PRN Jesus Barr  mL/hr at 01/08/23 2148 10 mEq at 01/08/23 2148    magnesium sulfate 1000 mg in dextrose 5% 100 mL IVPB  1,000 mg IntraVENous PRN Jesus Barr MD        sodium phosphate 10 mmol in sodium chloride 0.9 % 250 mL IVPB  10 mmol IntraVENous PRN Jesus Barr MD 62.5 mL/hr at 01/10/23 0927 10 mmol at 01/10/23 0927    Or    sodium phosphate 15 mmol in dextrose 5 % 250 mL IVPB  15 mmol IntraVENous PRN Jesus Barr MD   Stopped at 01/08/23 1315    Or    sodium phosphate 20 mmol in dextrose 5 % 500 mL IVPB  20 mmol IntraVENous PRN Jesus Barr MD        sodium chloride flush 0.9 % injection 5-40 mL  5-40 mL IntraVENous 2 times per day Jesus Barr MD   10 mL at 01/09/23 1930    sodium chloride flush 0.9 % injection 5-40 mL  5-40 mL IntraVENous PRN Jesus Barr MD        0.9 % sodium chloride infusion   IntraVENous PRN Jesus Barr MD     ondansetron (ZOFRAN-ODT) disintegrating tablet 4 mg  4 mg Oral Q8H PRN Becca Dolan MD        Or    ondansetron (ZOFRAN) injection 4 mg  4 mg IntraVENous Q6H PRN Becca Dolan MD        polyethylene glycol (GLYCOLAX) packet 17 g  17 g Oral Daily PRN Becca Dolan MD        acetaminophen (TYLENOL) tablet 650 mg  650 mg Oral Q6H PRN Becca Dolan MD        Or    acetaminophen (TYLENOL) suppository 650 mg  650 mg Rectal Q6H PRN Becca Dolan MD         Technical Description: This is a 21-channel digital EEG recording with time-locked video. Electrodes were placed in accordance with the 10-20 International System of Electrode Placement. Single lead EKG monitoring was included. Baseline EEG Recording:  A formal baseline EEG recording was not obtained. Day 1 - 1/9/23, starting at 730 am    Interictal EEG Samples: In the alert state, the posterior background rhythm was a symmetric 5-6 Hz of 20-40 uV rhythm which reacted symmetrically to eye opening and had a disorganized frequency-amplitude gradient. Near continuous left anterior temporal sharp waves 40-50 Uv were seen occurring in periodic pattern at 1 Hz. The EKG channel revealed no abnormalities. Ictal EEG Recording / Patient Events: During this period the patient had no events or seizures. Summary: During this day of recording no events were recorded. The interictal EEG was abnormal due to diffuse polymorphic theta slowing suggesting moderate encephalopathy. Near continuous left anterior temporal sharp waves conferred an increased risk for focal onset seizures. Monitoring was continued in order to record the patient's typical events. The EKG channel revealed no abnormalities. Day 2 - 1/10/23, reviewed through 7:30 am    Interictal EEG Samples: Interictal EEG was unchanged from yesterday. Ictal EEG Recording / Patient Events: During this period the patient had no events or seizures.     Summary: During this day of recording no events were recorded. The interictal EEG was abnormal due to diffuse polymorphic theta slowing suggesting moderate encephalopathy. Near continuous left anterior temporal sharp waves conferred an increased risk for focal onset seizures. Monitoring was continued in order to record the patient's typical events. The EKG channel revealed no abnormalities. Belinda Erickson MD  Diplomate, American Board of Psychiatry and Neurology  Diplomate, American Board of Clinical Neurophysiology  Diplomate, American Board of Epilepsy       Please note this is a preliminary report and updated daily. The final report will have a summary of behavior and electrographic findings with clinical correlation.

## 2023-01-11 ENCOUNTER — APPOINTMENT (OUTPATIENT)
Dept: GENERAL RADIOLOGY | Age: 58
DRG: 405 | End: 2023-01-11
Payer: MEDICAID

## 2023-01-11 LAB
ABSOLUTE EOS #: 0.09 K/UL (ref 0–0.44)
ABSOLUTE IMMATURE GRANULOCYTE: 0.05 K/UL (ref 0–0.3)
ABSOLUTE LYMPH #: 3.02 K/UL (ref 1.1–3.7)
ABSOLUTE MONO #: 1.03 K/UL (ref 0.1–1.2)
ANION GAP SERPL CALCULATED.3IONS-SCNC: 12 MMOL/L (ref 9–17)
BASOPHILS # BLD: 0 % (ref 0–2)
BASOPHILS ABSOLUTE: 0.04 K/UL (ref 0–0.2)
BUN BLDV-MCNC: 13 MG/DL (ref 6–20)
CALCIUM SERPL-MCNC: 8.1 MG/DL (ref 8.6–10.4)
CHLORIDE BLD-SCNC: 104 MMOL/L (ref 98–107)
CO2: 22 MMOL/L (ref 20–31)
CREAT SERPL-MCNC: 0.78 MG/DL (ref 0.7–1.2)
EKG ATRIAL RATE: 151 BPM
EKG P AXIS: 43 DEGREES
EKG P-R INTERVAL: 130 MS
EKG Q-T INTERVAL: 246 MS
EKG QRS DURATION: 98 MS
EKG QTC CALCULATION (BAZETT): 389 MS
EKG R AXIS: 53 DEGREES
EKG T AXIS: -97 DEGREES
EKG VENTRICULAR RATE: 151 BPM
EOSINOPHILS RELATIVE PERCENT: 1 % (ref 1–4)
GFR SERPL CREATININE-BSD FRML MDRD: >60 ML/MIN/1.73M2
GLUCOSE BLD-MCNC: 106 MG/DL (ref 75–110)
GLUCOSE BLD-MCNC: 155 MG/DL (ref 70–99)
GLUCOSE BLD-MCNC: 160 MG/DL (ref 75–110)
GLUCOSE BLD-MCNC: 204 MG/DL (ref 75–110)
GLUCOSE BLD-MCNC: 212 MG/DL (ref 75–110)
HCT VFR BLD CALC: 41.8 % (ref 40.7–50.3)
HEMOGLOBIN: 13.3 G/DL (ref 13–17)
IMMATURE GRANULOCYTES: 0 %
LYMPHOCYTES # BLD: 24 % (ref 24–43)
MAGNESIUM: 1.7 MG/DL (ref 1.6–2.6)
MCH RBC QN AUTO: 29.4 PG (ref 25.2–33.5)
MCHC RBC AUTO-ENTMCNC: 31.8 G/DL (ref 28.4–34.8)
MCV RBC AUTO: 92.3 FL (ref 82.6–102.9)
MONOCYTES # BLD: 8 % (ref 3–12)
NRBC AUTOMATED: 0 PER 100 WBC
PARTIAL THROMBOPLASTIN TIME: 46.9 SEC (ref 20.5–30.5)
PARTIAL THROMBOPLASTIN TIME: 50.3 SEC (ref 20.5–30.5)
PARTIAL THROMBOPLASTIN TIME: 68.3 SEC (ref 20.5–30.5)
PDW BLD-RTO: 13.3 % (ref 11.8–14.4)
PHOSPHORUS: 3.3 MG/DL (ref 2.5–4.5)
PLATELET # BLD: 199 K/UL (ref 138–453)
PMV BLD AUTO: 11.4 FL (ref 8.1–13.5)
POTASSIUM SERPL-SCNC: 3.7 MMOL/L (ref 3.7–5.3)
RBC # BLD: 4.53 M/UL (ref 4.21–5.77)
SEG NEUTROPHILS: 67 % (ref 36–65)
SEGMENTED NEUTROPHILS ABSOLUTE COUNT: 8.23 K/UL (ref 1.5–8.1)
SODIUM BLD-SCNC: 138 MMOL/L (ref 135–144)
TROPONIN, HIGH SENSITIVITY: 85 NG/L (ref 0–22)
TROPONIN, HIGH SENSITIVITY: 92 NG/L (ref 0–22)
TROPONIN, HIGH SENSITIVITY: 94 NG/L (ref 0–22)
WBC # BLD: 12.5 K/UL (ref 3.5–11.3)

## 2023-01-11 PROCEDURE — 6370000000 HC RX 637 (ALT 250 FOR IP)

## 2023-01-11 PROCEDURE — 84100 ASSAY OF PHOSPHORUS: CPT

## 2023-01-11 PROCEDURE — 82947 ASSAY GLUCOSE BLOOD QUANT: CPT

## 2023-01-11 PROCEDURE — 93010 ELECTROCARDIOGRAM REPORT: CPT | Performed by: INTERNAL MEDICINE

## 2023-01-11 PROCEDURE — 2580000003 HC RX 258

## 2023-01-11 PROCEDURE — 80048 BASIC METABOLIC PNL TOTAL CA: CPT

## 2023-01-11 PROCEDURE — 83735 ASSAY OF MAGNESIUM: CPT

## 2023-01-11 PROCEDURE — 2000000000 HC ICU R&B

## 2023-01-11 PROCEDURE — 94660 CPAP INITIATION&MGMT: CPT

## 2023-01-11 PROCEDURE — 85730 THROMBOPLASTIN TIME PARTIAL: CPT

## 2023-01-11 PROCEDURE — 6370000000 HC RX 637 (ALT 250 FOR IP): Performed by: STUDENT IN AN ORGANIZED HEALTH CARE EDUCATION/TRAINING PROGRAM

## 2023-01-11 PROCEDURE — 84484 ASSAY OF TROPONIN QUANT: CPT

## 2023-01-11 PROCEDURE — 95711 VEEG 2-12 HR UNMONITORED: CPT

## 2023-01-11 PROCEDURE — 6360000002 HC RX W HCPCS: Performed by: STUDENT IN AN ORGANIZED HEALTH CARE EDUCATION/TRAINING PROGRAM

## 2023-01-11 PROCEDURE — 99231 SBSQ HOSP IP/OBS SF/LOW 25: CPT | Performed by: PSYCHIATRY & NEUROLOGY

## 2023-01-11 PROCEDURE — 95720 EEG PHY/QHP EA INCR W/VEEG: CPT | Performed by: PSYCHIATRY & NEUROLOGY

## 2023-01-11 PROCEDURE — 71045 X-RAY EXAM CHEST 1 VIEW: CPT

## 2023-01-11 PROCEDURE — C9254 INJECTION, LACOSAMIDE: HCPCS

## 2023-01-11 PROCEDURE — 2580000003 HC RX 258: Performed by: STUDENT IN AN ORGANIZED HEALTH CARE EDUCATION/TRAINING PROGRAM

## 2023-01-11 PROCEDURE — 6360000002 HC RX W HCPCS

## 2023-01-11 PROCEDURE — APPSS30 APP SPLIT SHARED TIME 16-30 MINUTES: Performed by: NURSE PRACTITIONER

## 2023-01-11 PROCEDURE — 2500000003 HC RX 250 WO HCPCS: Performed by: STUDENT IN AN ORGANIZED HEALTH CARE EDUCATION/TRAINING PROGRAM

## 2023-01-11 PROCEDURE — 99291 CRITICAL CARE FIRST HOUR: CPT | Performed by: INTERNAL MEDICINE

## 2023-01-11 PROCEDURE — 2500000003 HC RX 250 WO HCPCS

## 2023-01-11 PROCEDURE — 36415 COLL VENOUS BLD VENIPUNCTURE: CPT

## 2023-01-11 PROCEDURE — 85025 COMPLETE CBC W/AUTO DIFF WBC: CPT

## 2023-01-11 RX ORDER — NITROGLYCERIN 20 MG/100ML
5-200 INJECTION INTRAVENOUS CONTINUOUS
Status: DISCONTINUED | OUTPATIENT
Start: 2023-01-11 | End: 2023-01-14

## 2023-01-11 RX ORDER — LEVETIRACETAM 5 MG/ML
500 INJECTION INTRAVASCULAR EVERY 12 HOURS
Status: DISCONTINUED | OUTPATIENT
Start: 2023-01-11 | End: 2023-01-13

## 2023-01-11 RX ORDER — NITROGLYCERIN 20 MG/100ML
5-200 INJECTION INTRAVENOUS CONTINUOUS
Status: DISCONTINUED | OUTPATIENT
Start: 2023-01-11 | End: 2023-01-11

## 2023-01-11 RX ORDER — FUROSEMIDE 10 MG/ML
40 INJECTION INTRAMUSCULAR; INTRAVENOUS 2 TIMES DAILY
Status: DISCONTINUED | OUTPATIENT
Start: 2023-01-11 | End: 2023-01-17

## 2023-01-11 RX ORDER — METOPROLOL TARTRATE 5 MG/5ML
5 INJECTION INTRAVENOUS ONCE
Status: COMPLETED | OUTPATIENT
Start: 2023-01-11 | End: 2023-01-11

## 2023-01-11 RX ORDER — LISINOPRIL 5 MG/1
5 TABLET ORAL DAILY
Status: DISCONTINUED | OUTPATIENT
Start: 2023-01-11 | End: 2023-01-12

## 2023-01-11 RX ORDER — LEVETIRACETAM 500 MG/5ML
500 INJECTION, SOLUTION, CONCENTRATE INTRAVENOUS EVERY 12 HOURS
Status: DISCONTINUED | OUTPATIENT
Start: 2023-01-11 | End: 2023-01-11

## 2023-01-11 RX ADMIN — HEPARIN SODIUM 2000 UNITS: 1000 INJECTION INTRAVENOUS; SUBCUTANEOUS at 05:16

## 2023-01-11 RX ADMIN — SODIUM CHLORIDE, PRESERVATIVE FREE 20 MG: 5 INJECTION INTRAVENOUS at 08:18

## 2023-01-11 RX ADMIN — CEFTRIAXONE SODIUM 1000 MG: 10 INJECTION, POWDER, FOR SOLUTION INTRAVENOUS at 08:18

## 2023-01-11 RX ADMIN — DEXMEDETOMIDINE HYDROCHLORIDE 1 MCG/KG/HR: 4 INJECTION, SOLUTION INTRAVENOUS at 03:48

## 2023-01-11 RX ADMIN — LACOSAMIDE 100 MG: 10 INJECTION INTRAVENOUS at 08:19

## 2023-01-11 RX ADMIN — LISINOPRIL 5 MG: 5 TABLET ORAL at 17:47

## 2023-01-11 RX ADMIN — DESMOPRESSIN ACETATE 40 MG: 0.2 TABLET ORAL at 20:52

## 2023-01-11 RX ADMIN — METOPROLOL TARTRATE 5 MG: 5 INJECTION, SOLUTION INTRAVENOUS at 22:28

## 2023-01-11 RX ADMIN — SODIUM CHLORIDE, PRESERVATIVE FREE 10 ML: 5 INJECTION INTRAVENOUS at 20:52

## 2023-01-11 RX ADMIN — LACOSAMIDE 100 MG: 10 INJECTION INTRAVENOUS at 20:54

## 2023-01-11 RX ADMIN — INSULIN LISPRO 2 UNITS: 100 INJECTION, SOLUTION INTRAVENOUS; SUBCUTANEOUS at 08:31

## 2023-01-11 RX ADMIN — INSULIN LISPRO 2 UNITS: 100 INJECTION, SOLUTION INTRAVENOUS; SUBCUTANEOUS at 01:03

## 2023-01-11 RX ADMIN — SODIUM CHLORIDE, PRESERVATIVE FREE 20 MG: 5 INJECTION INTRAVENOUS at 20:52

## 2023-01-11 RX ADMIN — LEVETIRACETAM 500 MG: 5 INJECTION INTRAVENOUS at 12:37

## 2023-01-11 RX ADMIN — SODIUM CHLORIDE, PRESERVATIVE FREE 10 ML: 5 INJECTION INTRAVENOUS at 08:18

## 2023-01-11 RX ADMIN — LEVETIRACETAM 500 MG: 5 INJECTION INTRAVENOUS at 22:32

## 2023-01-11 RX ADMIN — FUROSEMIDE 40 MG: 10 INJECTION, SOLUTION INTRAMUSCULAR; INTRAVENOUS at 16:13

## 2023-01-11 RX ADMIN — HEPARIN SODIUM 22 UNITS/KG/HR: 10000 INJECTION, SOLUTION INTRAVENOUS at 23:03

## 2023-01-11 RX ADMIN — HEPARIN SODIUM 20 UNITS/KG/HR: 10000 INJECTION, SOLUTION INTRAVENOUS at 03:52

## 2023-01-11 RX ADMIN — NITROGLYCERIN 5 MCG/MIN: 20 INJECTION INTRAVENOUS at 14:43

## 2023-01-11 ASSESSMENT — PAIN SCALES - GENERAL: PAINLEVEL_OUTOF10: 0

## 2023-01-11 NOTE — PROGRESS NOTES
Physician Progress Note      PATIENT:               Pat Hall  CSN #:                  [id number]  :                       1965  ADMIT DATE:       2023 4:22 PM  DISCH DATE:  RESPONDING  PROVIDER #:        Oswaldo Herrera DO          QUERY TEXT:    Pt admitted with DKA and has systolic CHF documented. If possible, please   document in progress notes and discharge summary further specificity regarding   the acuity of CHF:    The medical record reflects the following:  Risk Factors: CAD, HTN, ICMP  Clinical Indicators: per cardiology progress notes \"Systolic CHF with global   hypokinesis, EF 35-40%, Flash pulmonary edema, Continue IV diuresis for volume   overload\", CXR showed pulmonary opacities, concerning for edema  Treatment: IV Lasix, BIPAP, CXR, echo, labs, cardiac monitoring    Thank you, MARYCRUZ Bahena  email - [email or web address]  cell- [phone]  office hours [id number]  Options provided:  -- Acute on Chronic Systolic CHF/HFrEF  -- Acute Systolic CHF/HFrEF  -- Other - I will add my own diagnosis  -- Disagree - Not applicable / Not valid  -- Disagree - Clinically unable to determine / Unknown  -- Refer to Clinical Documentation Reviewer    PROVIDER RESPONSE TEXT:    This patient is in acute systolic CHF/HFrEF.     Query created by: García Messina on 2023 1:06 PM      Electronically signed by:  Genny Bennett DO 2023 2:16 PM

## 2023-01-11 NOTE — CARE COORDINATION
Transitional planning. Called pt's son Estela Carrillo [phone] concerning. Dao Biggs, referral faxed to [facility] as requested, pt's son will review list for additional 2 choices.    BiPAP 60%, sitter in room, LTME, nitro, hep, precedex gtts    Per pt's son: NO INFORMATION IS TO BE GIVEN TO ANYONE NOT ON EMERGENCY CONTACT LIST

## 2023-01-11 NOTE — PLAN OF CARE
Problem: Discharge Planning  Goal: Discharge to home or other facility with appropriate resources  1/11/2023 0910 by Ginette Peck RN  Outcome: Progressing  1/10/2023 2142 by Kina Schneider RN  Outcome: Progressing     Problem: Chronic Conditions and Co-morbidities  Goal: Patient's chronic conditions and co-morbidity symptoms are monitored and maintained or improved  1/11/2023 0910 by Ginette Peck RN  Outcome: Progressing  1/10/2023 2142 by Kina Schneider RN  Outcome: Progressing     Problem: Confusion  Goal: Confusion, delirium, dementia, or psychosis is improved or at baseline  Description: INTERVENTIONS:  1. Assess for possible contributors to thought disturbance, including medications, impaired vision or hearing, underlying metabolic abnormalities, dehydration, psychiatric diagnoses, and notify attending LIP  2. [place] high risk fall precautions, as indicated  3. Provide frequent short contacts to provide reality reorientation, refocusing and direction  4. Decrease environmental stimuli, including noise as appropriate  5. Monitor and intervene to maintain adequate nutrition, hydration, elimination, sleep and activity  6. If unable to ensure safety without constant attention obtain sitter and review sitter guidelines with assigned personnel  7. Initiate Psychosocial CNS and Spiritual Care consult, as indicated  1/11/2023 0910 by Ginette Peck RN  Outcome: Progressing  1/10/2023 2142 by Kina Schneider RN  Outcome: Progressing     Problem: Skin/Tissue Integrity  Goal: Absence of new skin breakdown  Description: 1. Monitor for areas of redness and/or skin breakdown  2. Assess vascular access sites hourly  3. Every 4-6 hours minimum:  Change oxygen saturation probe site  4. Every 4-6 hours:  If on nasal continuous positive airway pressure, respiratory therapy assess nares and determine need for appliance change or resting period.   1/11/2023 0910 by Ginette Peck RN  Outcome: Progressing  1/10/2023 2142 by Ev Lomax RN  Outcome: Progressing     Problem: Safety - Adult  Goal: Free from fall injury  1/10/2023 2142 by Ev Lomax RN  Outcome: Progressing     Problem: ABCDS Injury Assessment  Goal: Absence of physical injury  1/10/2023 2142 by Ev Lomax RN  Outcome: Progressing     Problem: Pain  Goal: Verbalizes/displays adequate comfort level or baseline comfort level  1/10/2023 2142 by Ev Lomax RN  Outcome: Progressing

## 2023-01-11 NOTE — PROGRESS NOTES
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 Patient presented to the ED with altered mental status with last known well two days prior. Patient was found to be hypertensive with -250, tachycardic with HR 110s. Patient appeared disheveled. EKG showed ST elevation in V1-V3 with mild elevation in V4, cardiology was consulted, troponins were downtrending and they recommended heparin infusion. Patient glucose was 620 with beta hydroxy butyrate 2.43 and anion gap 26. Patient was started on insulin gtt. Initial Labs in ED:  Cr 1.22  Lactic 2.3  Glucose 620  BHB 2.43  Anion Gap 26  Bicarb 22     pH 7.35 / 52.3 / 28.8 / 52.6  LFT unremarkable  TSH normal  WBC 19.7  UA negative for UTI     CT head negative  CXR unremarkable     CTA HEAD AND NECK  1. 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 2.  Patent right cervical internal carotid artery stent demonstrates   mild-to-moderate in stent stenosis. Follow-up catheter angiogram may be   helpful for further evaluation. 3.  Multifocal areas of moderate to severe stenosis in the right middle   cerebral artery branches. 4.  Multifocal severe stenoses in the right anterior cerebral artery A2 and   A3 segments. 5.  Focal severe stenosis in the left subclavian artery secondary to   noncalcified atherosclerotic plaque at the level of the left vertebral artery   origin. SUBJECTIVE:     OVERNIGHT EVENTS:    Episode of tachypnea and tachycardia. .  CXR showing concern for flash pulmonary edema. Given Lasix 40 x 2 doses overnight. Order for nitro drip in, only started for a few minutes then weaned off. Patient started on Bipap.     AWAKE & FOLLOWING COMMANDS:  [] No   [x] Yes    SECRETIONS Amount:  [x] Small [] Moderate  [] Large  [] None  Color:     [x] White [] Colored  [] Bloody    SEDATION:  RAAS Score:  [] Propofol gtt  [] Versed gtt  [] Ativan gtt   [x] No Sedation    PARALYZED:  [x] No    [] Yes    VASOPRESSORS:  [x] No    [] Yes  [] Levophed [] Dopamine [] Vasopressin  [] Dobutamine [] Phenylephrine [] Epinephrine      OBJECTIVE:     VITAL SIGNS:  /74   Pulse 59   Temp 97.9 °F (36.6 °C) (Axillary)   Resp 14   Ht 5' 6\" (1.676 m)   Wt 149 lb 11.1 oz (67.9 kg)   SpO2 99%   BMI 24.91 kg/m²   Tmax over 24 hours:  Temp (24hrs), Av °F (36.7 °C), Min:97.7 °F (36.5 °C), Max:98.4 °F (36.9 °C)      Patient Vitals for the past 8 hrs:   BP Temp Temp src Pulse Resp SpO2 Weight   23 0600 -- -- -- -- -- -- 149 lb 11.1 oz (67.9 kg)   23 0515 134/74 -- -- 59 14 99 % --   23 0500 132/74 -- -- 59 15 100 % --   23 0445 131/72 -- -- 59 14 100 % --   23 0430 127/71 -- -- 59 16 98 % --   23 0421 -- -- -- 60 -- 100 % --   23 0415 130/73 -- -- 59 15 100 % --   23 0413 -- -- -- -- 15 -- --   23 0400 130/72 97.9 °F (36.6 °C) Axillary 60 14 100 % --   23 0345 133/73 -- -- 60 13 100 % --   23 0330 133/75 -- -- 60 15 100 % --   23 0315 131/73 -- -- 62 16 98 % --   23 0300 (!) 142/78 -- -- 63 14 99 % --   23 0245 (!) 146/76 -- -- 64 15 99 % --   23 0230 (!) 149/72 -- -- 63 12 99 % --   23 0215 (!) 160/72 -- -- 63 15 100 % --   23 0200 -- 98.4 °F (36.9 °C) Axillary -- -- -- --   23 0145 (!) 170/76 -- -- 64 16 100 % --   23 0130 (!) 166/76 -- -- 65 14 100 % --   23 0115 (!) 165/76 -- -- 65 16 99 % --   23 0100 (!) 162/76 -- -- 65 17 100 % --   23 0045 (!) 159/75 -- -- 64 14 99 % --   01/11/23 0030 (!) 160/75 -- -- 65 14 100 % --   01/11/23 0015 (!) 155/75 -- -- 65 14 99 % --   01/11/23 0000 (!) 156/75 98.2 °F (36.8 °C) Axillary 65 16 100 % --   01/10/23 2356 -- -- -- -- 15 -- --   01/10/23 2330 (!) 159/79 -- -- 68 16 100 % --   01/10/23 2300 (!) 154/78 -- -- 70 19 100 % --         Intake/Output Summary (Last 24 hours) at 1/11/2023 0652  Last data filed at 1/11/2023 0618  Gross per 24 hour   Intake 1306.63 ml   Output 3355 ml   Net -2048.37 ml     Date 01/11/23 0000 - 01/11/23 2359   Shift 1280-8568 0648-8057 8140-5754 24 Hour Total   INTAKE   I.V.(mL/kg) 254.4(3.7)   254.4(3.7)   IV Piggyback(mL/kg) 14(0.2)   14(0.2)   Shift Total(mL/kg) 268.4(4)   268.4(4)   OUTPUT   Urine(mL/kg/hr) 1600   1600   Shift Total(mL/kg) 1600(23.6)   1600(23.6)   Weight (kg) 67.9 67.9 67.9 67.9     Wt Readings from Last 3 Encounters:   01/11/23 149 lb 11.1 oz (67.9 kg)   01/08/23 190 lb (86.2 kg)   08/23/22 180 lb (81.6 kg)     Body mass index is 24.91 kg/m².            PHYSICAL EXAM:  GEN:                  Arousable, repetitive speech pattern, unable to answer questions. On Bipap  EYES:                pupils equal, round, and reactive to light  HEENT:            Normocephalic, without obvious abnormality  LUNGS:            good air exchange  CV:                     regular rate and rhythm  ABDOMEN:     soft and non-distended  MSK:                  R AKA  NEURO::           Arousable, repetitive speech pattern, residual RUE deficit, able to grasp with left hand and wiggles left toes and R knee  EXTREMITIES:  distal pulses intact, R AKA         MEDICATIONS:  Scheduled Meds:   lacosamide (VIMPAT) IVPB  100 mg IntraVENous BID    insulin lispro  0-8 Units SubCUTAneous Q6H    levETIRAcetam  500 mg Oral BID    famotidine (PEPCID) injection  20 mg IntraVENous BID    cefTRIAXone (ROCEPHIN) IV  1,000 mg IntraVENous Q24H    atorvastatin  40 mg Oral Nightly    aspirin  81 mg Oral Daily    therapeutic  multivitamin-minerals  1 tablet Oral Daily with breakfast    sodium chloride flush  5-40 mL IntraVENous 2 times per day     Continuous Infusions:   sodium chloride Stopped (01/10/23 1146)    nitroGLYCERIN Stopped (01/11/23 0552)    heparin (PORCINE) Infusion 22 Units/kg/hr (01/11/23 0618)    dexmedetomidine 0.7 mcg/kg/hr (01/11/23 0618)    dextrose      sodium chloride       PRN Meds:   bisacodyl, 5 mg, Daily PRN  glucose, 4 tablet, PRN  dextrose bolus, 125 mL, PRN   Or  dextrose bolus, 250 mL, PRN  glucagon (rDNA), 1 mg, PRN  dextrose, , Continuous PRN  heparin (porcine), 4,000 Units, PRN  heparin (porcine), 2,000 Units, PRN  dextrose bolus, 125 mL, PRN   Or  dextrose bolus, 250 mL, PRN  potassium chloride, 10 mEq, PRN  magnesium sulfate, 1,000 mg, PRN  sodium phosphate IVPB, 10 mmol, PRN   Or  sodium phosphate IVPB, 15 mmol, PRN   Or  sodium phosphate IVPB, 20 mmol, PRN  sodium chloride flush, 5-40 mL, PRN  sodium chloride, , PRN  ondansetron, 4 mg, Q8H PRN   Or  ondansetron, 4 mg, Q6H PRN  polyethylene glycol, 17 g, Daily PRN  acetaminophen, 650 mg, Q6H PRN   Or  acetaminophen, 650 mg, Q6H PRN        SUPPORT DEVICES: [] Ventilator [x] BIPAP  [] Nasal Cannula [] Room Air    DATA:  Complete Blood Count:   Recent Labs     01/09/23  0458 01/10/23  0430   WBC 15.8* 14.6*   RBC 4.42 4.98   HGB 12.9* 15.0   HCT 40.2* 46.6   MCV 91.0 93.6   MCH 29.2 30.1   MCHC 32.1 32.2   RDW 13.0 13.1    222   MPV 11.1 12.0        Last 3 Blood Glucose:   Recent Labs     01/08/23  0901 01/08/23  1200 01/08/23  1739 01/08/23  1919 01/09/23  0002 01/09/23  0458 01/09/23  0924 01/10/23  0430   GLUCOSE 145* 262* 80 99 176* 165* 75 155*        PT/INR:    Lab Results   Component Value Date/Time    PROTIME 13.7 08/23/2022 11:39 PM    INR 1.1 08/23/2022 11:39 PM     PTT:    Lab Results   Component Value Date/Time    APTT 46.9 01/11/2023 03:07 AM       Comprehensive Metabolic Profile:   Recent Labs     01/09/23  0458 01/09/23  0924  01/10/23  0430   * 140 137   K 4.0 4.0 3.8   * 103 104   CO2 21 20 24   BUN 16 16 11   CREATININE 0.79 0.76 0.69*   GLUCOSE 165* 75 155*   CALCIUM 8.4* 8.1* 8.5*      Magnesium:   Lab Results   Component Value Date/Time    MG 2.0 01/10/2023 04:30 AM    MG 2.1 01/09/2023 09:24 AM    MG 2.2 01/09/2023 04:58 AM     Phosphorus:   Lab Results   Component Value Date/Time    PHOS 2.3 01/10/2023 04:30 AM    PHOS 3.2 01/09/2023 09:24 AM    PHOS 2.4 01/09/2023 04:58 AM     Ionized Calcium:   Lab Results   Component Value Date/Time    CAION 1.13 07/04/2019 04:40 AM    CAION 0.97 04/09/2019 12:21 AM    CAION 1.01 04/07/2019 07:47 PM        Urinalysis:   Lab Results   Component Value Date/Time    NITRU NEGATIVE 01/07/2023 05:08 PM    COLORU Yellow 01/07/2023 05:08 PM    PHUR 5.5 01/07/2023 05:08 PM    WBCUA 0 TO 2 01/07/2023 05:08 PM    RBCUA 0 TO 2 01/07/2023 05:08 PM    MUCUS NOT REPORTED 04/14/2019 01:01 AM    TRICHOMONAS NOT REPORTED 04/14/2019 01:01 AM    YEAST NOT REPORTED 04/14/2019 01:01 AM    BACTERIA NOT REPORTED 04/14/2019 01:01 AM    SPECGRAV 1.036 01/07/2023 05:08 PM    LEUKOCYTESUR NEGATIVE 01/07/2023 05:08 PM    UROBILINOGEN Normal 01/07/2023 05:08 PM    BILIRUBINUR NEGATIVE 01/07/2023 05:08 PM    GLUCOSEU 3+ 01/07/2023 05:08 PM    KETUA SMALL 01/07/2023 05:08 PM    AMORPHOUS NOT REPORTED 04/14/2019 01:01 AM       HgBA1c:    Lab Results   Component Value Date/Time    LABA1C 12.7 01/09/2023 04:32 PM     TSH:    Lab Results   Component Value Date/Time    TSH 0.45 01/07/2023 11:24 PM     Lactic Acid: No results found for: LACTA   Troponin: No results for input(s): TROPONINI in the last 72 hours.     ASSESSMENT:     Patient Active Problem List    Diagnosis Date Noted    Stenosis of right middle cerebral artery 01/09/2023    At risk of seizures 01/09/2023    Cardiomyopathy, ischemic 01/09/2023    Acute ischemic right MCA stroke ([facility] 01/09/2023    Altered mental status 01/07/2023    DKA, type 2, not at goal [facility] 01/07/2023    Pressure injury of right heel, unstageable ([facility] 12/10/2020    Hx of AKA (above knee amputation), right ([facility] 12/10/2020    Erectile dysfunction associated with type 2 diabetes mellitus ([facility] 12/10/2020    Above knee amputation of right lower extremity ([facility] 09/28/2019    Coronary artery disease 08/23/2019    Ischemic gangrene ([facility]     Pyogenic inflammation of bone (HCC)     Status post angiography of extremity 08/20/2019    Leukocytosis     Right-sided extracranial carotid artery stenosis 07/03/2019    Essential hypertension     Type 2 diabetes mellitus with diabetic neuropathy, without long-term current use of insulin ([facility])     Cellulitis of right lower extremity     Moderate malnutrition ([facility] 04/26/2019    Acute CVA (cerebrovascular accident) ([facility] 04/25/2019    Thrombocytosis     Right foot drop     Carotid stenosis, bilateral     Acute cerebral infarction associated with systemic hypoxia or ischemia ([facility])     Multi infarct state     Hepatitis     Acute metabolic encephalopathy     Abnormal CT of the head     PAD (peripheral artery disease) ([facility] [address] 04/07/2019    Subacute osteomyelitis of right tibia [facility])           PLAN:     Neuro  Arousable, repetitive speech pattern, not following commands  Precedex gtt 0.7  Neurology following  CTA head/neck 1/6 showing severe stenosis in L subclavian artery, R TOAN, MCA, ICA and vertebral arteries  MRI brain 1/8 - punctate R temporal periventricular stroke  On ASA and Lipitor  Neuroendovascular - continue ASA for punctate ischemic stroke - stenoses are chronic and unchanged since 2019, no intervention at this time  LTME 1/8 - moderate encephalopathy, left anterior temporal sharps suggesting underlying lesion or dysfunction, increased risk for focal seizures   Modified barium swallow 1/9 for mildly thickened liquids  Thiamine and folic acid given in ED     Cardiology  Hx CABG, R ICA stent, vertebral artery stenosis, TOAN MCA and ICA stenosis  EKG in ED showing ST elevation in V1-V3  Cardiology consulted  Heparin gtt  Troponin 30-25-30  -250 in ED - initially on cardene gtt, now off cardene gtt  Overnight  - nitro gtt ordered but weaned after a few minutes  Lopressor 2.5 q 6h     Pulmonary  BiPap 60% FiO2  ABG 7.220/55.3/38.9/21.8  CXR 1/10 overnight showing concern for flash pulm edema  Repeat CXR this morning     Renal  Cr 0.69, improving   U out 3355  Given Lasix 20 x 2 overnight for concern for flash pulm edema  NS @ 50 ml/hr     GI  Modified barium swallow 1/9 passed for mildly thickened liquids  Diet   Pepcid 20 BID     ID  WBC 14.6, improving  Ceftriaxone  Blood cultures no growth x 1 day     Endo  Glucose 204  HDISS q 4 h  NS @ 50 ml/hr       Gill Sellers M.D. Emergency Medicine Resident, PGY-2  1/11/2023 6:52 AM      Attending Physician Statement  I have discussed the care of Gisselle Crabtree, including pertinent history and exam findings with the resident. I have reviewed the key elements of all parts of the encounter with the resident. I have seen and examined the patient with the resident. I agree with the assessment and plan and status of the problem list as documented. I see the patient during my round today, chart reviewed, labs and medications reviewed overnight events noted. Patient started having respiratory distress overnight he had received 2 dose of Lasix 40 mg and 40 mg and was started on nitro drip he was also started on BiPAP as he was in respiratory distress and hypoxic. Chest x-ray was consistent with pulm edema. Patient was also started on nitroglycerin drip as his systolic blood pressure was high also. This morning when I saw him he was on BiPAP 16/6/50 percent he was saturating 98% and he was feeling better but he was still in tachypneic in mild distress he was lethargic but arousable move extremities. He was on Precedex and currently he is off Precedex drip.   We will give him Lasix 40 mg now and will start him on Lasix 40 mg twice daily IV. Will resume nitro drip at lower dose and his systolic to bring around 348 and also to help pulm edema with underlying coronary artery disease with low EF. He was seen by cardiology. Will monitor urine output and renal function. Will keep off Precedex if possible. Will start him on lisinopril 5 mg and then adjust lisinopril. His heart rate is currently 55 if his heart rate improved then will start him on beta-blocker. Continue with BiPAP at this time and will wean off BiPAP. Discussed with nursing staff, treatment and plan discussed. Discussed with respiratory therapist.    Total critical care time caring for this patient with life threatening, unstable organ failure, including direct patient contact, management of life support systems, review of data including imaging and labs, discussions with other team members and physicians at least 45 min so far today, excluding procedures. Please note that this chart was generated using voice recognition Dragon dictation software. Although every effort was made to ensure the accuracy of this automated transcription, some errors in transcription may have occurred.      Darryl Medina MD  1/11/2023 2:37 PM

## 2023-01-11 NOTE — PLAN OF CARE
Problem: Discharge Planning  Goal: Discharge to home or other facility with appropriate resources  1/10/2023 2142 by Luann Chavez RN  Outcome: Progressing     Problem: Chronic Conditions and Co-morbidities  Goal: Patient's chronic conditions and co-morbidity symptoms are monitored and maintained or improved  1/10/2023 2142 by Luann Chavez RN  Outcome: Progressing     Problem: Safety - Medical Restraint  Goal: Remains free of injury from restraints (Restraint for Interference with Medical Device)  Description: INTERVENTIONS:  1. Determine that other, less restrictive measures have been tried or would not be effective before applying the restraint  2. Evaluate the patient's condition at the time of restraint application  3. Inform patient/family regarding the reason for restraint  4. Q2H: Monitor safety, psychosocial status, comfort, nutrition and hydration  1/10/2023 2142 by Luann Chavez RN  Outcome: Completed  Flowsheets (Taken 1/10/2023 2000)  Remains free of injury from restraints (restraint for interference with medical device): Every 2 hours: Monitor safety, psychosocial status, comfort, nutrition and hydration     Problem: Confusion  Goal: Confusion, delirium, dementia, or psychosis is improved or at baseline  Description: INTERVENTIONS:  1. Assess for possible contributors to thought disturbance, including medications, impaired vision or hearing, underlying metabolic abnormalities, dehydration, psychiatric diagnoses, and notify attending LIP  2. [place] high risk fall precautions, as indicated  3. Provide frequent short contacts to provide reality reorientation, refocusing and direction  4. Decrease environmental stimuli, including noise as appropriate  5. Monitor and intervene to maintain adequate nutrition, hydration, elimination, sleep and activity  6. If unable to ensure safety without constant attention obtain sitter and review sitter guidelines with assigned personnel  7.  Initiate Psychosocial CNS and Spiritual Care consult, as indicated  1/10/2023 2142 by Amber Lozano RN  Outcome: Progressing     Problem: Safety - Adult  Goal: Free from fall injury  Outcome: Progressing     Problem: ABCDS Injury Assessment  Goal: Absence of physical injury  Outcome: Progressing     Problem: Pain  Goal: Verbalizes/displays adequate comfort level or baseline comfort level  Outcome: Progressing

## 2023-01-11 NOTE — PROCEDURES
LONG-TERM EEG-VIDEO [id number] [address]    Patient: Rosita Garza  Age: 61 y.o. MRN: [id number]    Referring Physician: No ref. provider found  History: The patient is a 61 y.o. male who presented breakthrough seizure/encephalopathy. This long-term video-EEG monitoring study was performed to determine the nature of the patient's clinical events. The patient is on neuroactive medications.    Dash Mehta   Current Facility-Administered Medications   Medication Dose Route Frequency Provider Last Rate Last Admin    levETIRAcetam (KEPPRA) 500 mg/100 mL IVPB  500 mg IntraVENous Q12H Abad Joel MD        0.9 % sodium chloride infusion   IntraVENous Continuous Ligia Keller MD   Stopped at 01/10/23 1146    lacosamide (VIMPAT) 100 mg in sodium chloride 0.9 % 60 mL IVPB  100 mg IntraVENous BID Abad Joel MD   Stopped at 01/11/23 0854    insulin lispro (HUMALOG) injection vial 0-8 Units  0-8 Units SubCUTAneous Q6H Cosme Santizo DO   2 Units at 01/11/23 0831    bisacodyl (DULCOLAX) EC tablet 5 mg  5 mg Oral Daily PRN Abad Joel MD        heparin 25,000 units in dextrose 5 % 250 mL infusion (rate based)  5-30 Units/kg/hr IntraVENous Continuous Kenrick Link MD 14.9 mL/hr at 01/11/23 0904 22 Units/kg/hr at 01/11/23 0904    dexmedetomidine (PRECEDEX) 400 mcg in sodium chloride 0.9 % 100 mL infusion  0.1-1.5 mcg/kg/hr IntraVENous Continuous Kenrick Link MD 8.5 mL/hr at 01/11/23 0904 0.5 mcg/kg/hr at 01/11/23 0904    glucose chewable tablet 16 g  4 tablet Oral PRN Bran Anglin MD        dextrose bolus 10% 125 mL  125 mL IntraVENous PRN Bran Anglin MD        Or    dextrose bolus 10% 250 mL  250 mL IntraVENous PRN Bran Anglin MD        glucagon (rDNA) injection 1 mg  1 mg SubCUTAneous PRN Bran Anglin MD        dextrose 10 % infusion   IntraVENous Continuous PRROMELIA Anglin MD famotidine (PEPCID) 20 mg in sodium chloride (PF) 0.9 % 10 mL injection  20 mg IntraVENous BID Griselda Abbott MD   20 mg at 01/11/23 0818    cefTRIAXone (ROCEPHIN) 1,000 mg in sterile water 10 mL IV syringe  1,000 mg IntraVENous Q24H Griselda Abbott MD   1,000 mg at 01/11/23 0818    heparin (porcine) injection 4,000 Units  4,000 Units IntraVENous PRN Becca Dolan MD   4,000 Units at 01/10/23 1538    heparin (porcine) injection 2,000 Units  2,000 Units IntraVENous PRN Becca Dolan MD   2,000 Units at 01/11/23 0516    atorvastatin (LIPITOR) tablet 40 mg  40 mg Oral Nightly Becca Dolan MD   40 mg at 01/10/23 2009    aspirin chewable tablet 81 mg  81 mg Oral Daily Becca Dolan MD   81 mg at 01/10/23 0816    therapeutic multivitamin-minerals 1 tablet  1 tablet Oral Daily with breakfast Becca Dolan MD   1 tablet at 01/10/23 0816    dextrose bolus 10% 125 mL  125 mL IntraVENous PRN Becca Dolan MD   Stopped at 01/10/23 1139    Or    dextrose bolus 10% 250 mL  250 mL IntraVENous PRN Becca Dolan MD        potassium chloride 10 mEq/100 mL IVPB (Peripheral Line)  10 mEq IntraVENous PRN Becca Dolan  mL/hr at 01/08/23 2148 10 mEq at 01/08/23 2148    magnesium sulfate 1000 mg in dextrose 5% 100 mL IVPB  1,000 mg IntraVENous PRN Becca Dolan MD        sodium phosphate 10 mmol in sodium chloride 0.9 % 250 mL IVPB  10 mmol IntraVENous PRN Becca Dolan MD   Stopped at 01/10/23 1327    Or    sodium phosphate 15 mmol in dextrose 5 % 250 mL IVPB  15 mmol IntraVENous PRN Becca Dolan MD   Stopped at 01/08/23 1315    Or    sodium phosphate 20 mmol in dextrose 5 % 500 mL IVPB  20 mmol IntraVENous PRN Becca Dolan MD        sodium chloride flush 0.9 % injection 5-40 mL  5-40 mL IntraVENous 2 times per day Becca Dolan MD   10 mL at 01/11/23 0818    sodium chloride flush 0.9 % injection 5-40 mL  5-40 mL IntraVENous PRN Becca Dolan MD        0.9 % sodium chloride infusion   IntraVENous PRN Yusra Vo MD        ondansetron (ZOFRAN-ODT) disintegrating tablet 4 mg  4 mg Oral Q8H PRN Yusra Vo MD        Or    ondansetron (ZOFRAN) injection 4 mg  4 mg IntraVENous Q6H PRN Yusra Vo MD        polyethylene glycol (GLYCOLAX) packet 17 g  17 g Oral Daily PRN Yusra Vo MD        acetaminophen (TYLENOL) tablet 650 mg  650 mg Oral Q6H PRN Yusra Vo MD        Or    acetaminophen (TYLENOL) suppository 650 mg  650 mg Rectal Q6H PRN Yusra Vo MD         Technical Description: This is a 21-channel digital EEG recording with time-locked video. Electrodes were placed in accordance with the 10-20 International System of Electrode Placement. Single lead EKG monitoring was included. Baseline EEG Recording:  A formal baseline EEG recording was not obtained. Day 1 - 1/9/23, starting at 730 am    Interictal EEG Samples: In the alert state, the posterior background rhythm was a symmetric 5-6 Hz of 20-40 uV rhythm which reacted symmetrically to eye opening and had a disorganized frequency-amplitude gradient. Near continuous left anterior temporal sharp waves 40-50 Uv were seen occurring in periodic pattern at 1 Hz. The EKG channel revealed no abnormalities. Ictal EEG Recording / Patient Events: During this period the patient had no events or seizures. Summary: During this day of recording no events were recorded. The interictal EEG was abnormal due to diffuse polymorphic theta slowing suggesting moderate encephalopathy. Near continuous left anterior temporal sharp waves conferred an increased risk for focal onset seizures. Monitoring was continued in order to record the patient's typical events. The EKG channel revealed no abnormalities. Day 2 - 1/10/23    Interictal EEG Samples: Interictal EEG was unchanged from yesterday. Ictal EEG Recording / Patient Events: During this period the patient had no events or seizures.     Summary: During this day of recording no events were recorded. The interictal EEG was abnormal due to diffuse polymorphic theta slowing suggesting moderate encephalopathy. Near continuous left anterior temporal sharp waves conferred an increased risk for focal onset seizures. Monitoring was continued in order to record the patient's typical events. The EKG channel revealed no abnormalities. Day 3 - 1/11/23, reviewed through 7:30 am    Interictal EEG Samples: Interictal EEG was unchanged from yesterday. Ictal EEG Recording / Patient Events: During this period the patient had no events or seizures. Summary: During this day of recording no events were recorded. The interictal EEG was abnormal due to diffuse polymorphic theta slowing suggesting moderate encephalopathy. Previously seen left anterior temporal sharp waves appeared less frequent. These discharges conferred an increased risk for focal onset seizures. Monitoring was continued in order to record the patient's typical events. The EKG channel revealed no abnormalities. Jasmyn Love MD  Diplomate, American Board of Psychiatry and Neurology  Diplomate, American Board of Clinical Neurophysiology  Diplomate, American Board of Epilepsy       Please note this is a preliminary report and updated daily. The final report will have a summary of behavior and electrographic findings with clinical correlation.

## 2023-01-11 NOTE — PROGRESS NOTES
Inpatient Diabetes  Education     Type and Reason for Visit: Patient Education -    uncontrolled diabetes / hyperglycemia  Follow up rounding/ chart review 1/11/23  Not able to be assessed for diabetes self care needs at this time due to patient with poor mentation at this time - per chart review patient only intermittently oriented. Patient with sitter and remains unable to engage with education. Per chart review patient has history of diabetes type 2 , per A1C trend poor control. Lab Results   Component Value Date    LABA1C 12.7 (H) 01/09/2023    LABA1C 14.0 12/23/2021    LABA1C 12.9 11/09/2021     Home medication list includes Insulins - lantus and humalog and oral metformin. Last PCP visit with [id number] [facility] was 1 /13/ 2022 and not refils for home meds or insulin noted. No future PCP appt seen on appt desk. [facility] Diabetes Education team will follow up with patient education and support as patient's condition allows. Out patient diabetes education  contact number [id number] - [phone] to patient and placed on the discharge summary.     Richard Richardson RN

## 2023-01-11 NOTE — PROGRESS NOTES
[facility])  Occupational Therapy Not Seen Note    DATE: 2023    NAME: Juarez Zelaya  MRN: [id number]   : 1965      Patient not seen this date for Occupational Therapy due to:    Patient is not appropriate for active participation in OT evaluation/treatment at this time d/t no commands     Next Scheduled Treatment: check back 2023     Electronically signed by YANCY Whitlock on 2023 at 11:21 AM

## 2023-01-11 NOTE — PROGRESS NOTES
[facility] Cardiology Cardiology    Progress note               Today's Date: 1/11/2023  Patient Name: Dorann Lesch  Date of admission: 1/7/2023  4:22 PM  Patient's age: 61 y.o., 1965  Admission Dx: Altered mental status [R41.82]  DKA, type 2, not at goal [facility]) [E11.10]  Altered mental status, unspecified altered mental status type [R41.82]    Reason for Consult:  Cardiac evaluation    Requesting Physician: Sanjiv Fitzpatrick MD    CHIEF COMPLAINT: Altered mental status    History Obtained From:  electronic medical record    SUBJECTIVE:  Overnight patient developed hypertensive emergency, flash pulmonary edema, was started on nitro gtt and given IV lasix, made 2.7 L UOP  CXR this am shows improving pulmonary infiltrates  Off nitro gtt this am, SBP 130s  Patient unable to speak intelligibly this am while on BIPAP      HISTORY OF PRESENT ILLNESS:      The patient is a 61 y.o.  male who is admitted to the hospital for management of altered mental status    Cardiology consulted for abnormal EKG    Patient has medical history of peripheral vascular disease status post AKA, CAD status post CABG in 2019, ischemic cardiomyopathy with EF of 40%, diabetes mellitus type 2, history of CVA    Presented to the ED with altered mental status. CT head was negative. CTA head and neck showed chronic occlusion of left ICA and vertebral arteries, focal severe stenosis of left subclavian artery, multifocal stenosis of MCA, right TOAN. MRI brain showed tiny acute to subacute infarction of the right temporal region, malacia of bilateral pontine parietal lobes.     As per chart review at baseline he is normal and completes all his daily activities independently and was found altered    On arrival found to be hypertensive, tachycardic  ALMITA, DKA  Admitted to medical ICU  EKG the ED showed mild ST elevations in V1 to V3, started on heparin infusion  DKA resolved    Troponin flat 25> 30> 30> 32  Repeat EKG yesterday showing LVH, non specific ST changes in anterior septal leads   On heparin infusion, on aspirin and statin    Neurology on board  Endovascular on board for occlusions of ICA and vertebral artery no intervention planned    Last echo in august 2019, EF > 65%, trivial pericardial effusion  Last cath in April 2019, showed MVD, CABG in April 2019      Past Medical History:   has a past medical history of CAD (coronary artery disease), Cardiac arrest ([facility] Diabetes mellitus ([facility] Hyperlipidemia, Hypertension, Osteolysis, PEA (Pulseless electrical activity) ([facility] PVD (peripheral vascular disease) ([facility] and Wound, open. Past Surgical History:   has a past surgical history that includes Femur fracture surgery (Right, 2000); Cardiac catheterization (04/07/2019); Femur Surgery; Tonsillectomy (1974); Coronary artery bypass graft (04/07/2019); Hardware Removal (Right, 04/07/2019); Tibia fracture surgery (Right, 2000); Coronary artery bypass graft (N/A, 4/7/2019); RECONSTRUCTIVE REPAIR STERNAL (N/A, 4/8/2019); Hardware Removal (Right, 04/22/2019); REMOVE HARDWARE FEMUR (N/A, 4/22/2019); Carotid stent placement (07/03/2019); Leg amputation below knee (Right, 8/25/2019); Leg amputation below knee (Right, 8/27/2019); above knee amputation (Right, 09/28/2019); and AMPUTATION ABOVE KNEE (Right, 9/28/2019). Home Medications:    Prior to Admission medications    Medication Sig Start Date End Date Taking?  Authorizing Provider   metoprolol tartrate (LOPRESSOR) 25 MG tablet Take 2 tablets by mouth 2 times daily 8/24/22   Herman Corral MD   clopidogrel (PLAVIX) 75 MG tablet TAKE 1 TABLET BY MOUTH DAILY 2/17/22   Jose Maria Castillo MD   gabapentin (NEURONTIN) 300 MG capsule TAKE 1 CAPSULE BY MOUTH EVERY 8 HOURS 2/17/22 3/19/22  Jose Maria Castillo MD   metFORMIN (GLUCOPHAGE) 1000 MG tablet Take 1 tablet by mouth 2 times daily (with meals) 1/13/22 3/14/22  Jose Maria Castillo MD   insulin lispro, 1 Unit Dial, ([place] [facility]) 100 UNIT/ML SOPN INJECT SUBCUTANEOUSLY BEFORE MEALS AS DIRECTED PER SLIDING SCALE: SEE ATTACHED FOR SLIDING SCALE DIRECTIONS: Hold all insulin and notify Dr immediately if BG <60. -200: 2 UNITS; -250: 4UNITS; 251-300 6 UNITS; 301-350: 8 UNITS  Patient not taking: Reported on 1/13/2022 1/5/22   Chester Trinh MD   lisinopril-hydroCHLOROthiazide (PRINZIDE;ZESTORETIC) 20-25 MG per tablet Take 2 tablets by mouth daily .   Take 1 pill if blood pressure is less than 130/80 12/23/21   Chester Trinh MD   insulin glargine (LANTUS SOLOSTAR) 100 UNIT/ML injection pen Inject 24 Units into the skin nightly 11/9/21 1/13/22  Kassandra Amezquita MD   Insulin Syringe-Needle U-100 30G X 5/16\" 1 ML MISC 1 each by Does not apply route daily 11/9/21   Kassandra Amezquita MD   atorvastatin (LIPITOR) 40 MG tablet Take 1 tablet by mouth nightly 10/19/21   Ashley Salmon MD   metoprolol tartrate (LOPRESSOR) 50 MG tablet Take 1 tablet by mouth 2 times daily 10/19/21   Ashley Salmon MD   blood glucose test strips (ONETOUCH VERIO) strip TEST 2 TIMES A DAY & AS NEEDED FOR SYMPTOMS OF IRREGULAR BLOOD GLUCOSE. 5/28/21   Nilda Jordan MD   Alcohol Swabs (B-D SINGLE USE SWABS REGULAR) PADS USE AS DIRECTED WITH TESTING SUPPLIES 5/28/21   Nilda Jordan MD   Lancets ([address]) [employer] [facility] USE AS DIRECTED TO TEST BLOOD SUGAR 5/28/21   Nilda Jordan MD   sildenafil (VIAGRA) 100 MG tablet Take 1 tablet by mouth as needed for Erectile Dysfunction 1/7/21   Nilda Jordan MD   Blood Glucose Monitoring Suppl (ONE TOUCH ULTRA 2) w/Device KIT U UTD ONCE D 9/27/19   Historical Provider, MD   acetaminophen (TYLENOL) 500 MG tablet Take 2 tablets by mouth every 8 hours as needed for Pain 9/30/19   Eitan Multani DO   [address] [facility] by Does not apply route For 5 years 9/27/19   Nilda Jordan MD   Blood Glucose Monitoring Suppl [facility] BLOOD GLUCOSE METER) w/Device KIT 1 kit by Does not apply route once for 1 dose 9/27/19 9/27/19  Nilda Jordan MD   Insulin Pen Needle 32G X 6 MM MISC 1 each by Does not apply route daily 9/27/19   Nilda Jordan MD   furosemide (LASIX) 20 MG tablet Take 1 tablet by mouth daily  Patient not taking: Reported on 11/16/2021 7/4/19   Amalia Veronica APRN - CNP   aspirin 81 MG chewable tablet Take 1 tablet by mouth daily 5/10/19   Chiquis Yousif MD   Multiple Vitamins-Minerals (THERAPEUTIC MULTIVITAMIN-MINERALS) tablet Take 1 tablet by mouth daily (with breakfast) 5/10/19   Chiquis Yousif MD       Allergies:  Patient has no known allergies. Social History:   reports that he has been smoking cigarettes. He started smoking about 2 years ago. He has a 20.00 pack-year smoking history. He has never used smokeless tobacco. He reports that he does not drink alcohol and does not use drugs. Family History: family history includes Diabetes in his brother, father, and mother; Heart Disease in his brother, father, and mother; High Blood Pressure in his brother, father, and sister; Stroke in his father and mother. REVIEW OF SYSTEMS:  could not obtained due patient being altered      PHYSICAL EXAM:      /72   Pulse 57   Temp 97.7 °F (36.5 °C) (Axillary)   Resp 18   Ht 5' 6\" (1.676 m)   Wt 149 lb 11.1 oz (67.9 kg)   SpO2 94%   BMI 24.91 kg/m²    Constitutional and General Appearance: awake and alert, altered  HEENT: PERRL  Respiratory:  Clear auscultation  Cardiovascular:  Normal heart sounds  Abdomen:   soft  Extremities:   Lower extremity edema: No  Neurological:  Alert and oriented. Moves all extremities well    DATA:    Diagnostics:    EKG: non specific ST changes and LVH. ECHO: ordered, but not yet obtained. Stress Test: not obtained. Cardiac Angiography: not obtained.     Labs:     CBC:   Recent Labs     01/10/23  0430 01/11/23  0701   WBC 14.6* 12.5*   HGB 15.0 13.3   HCT 46.6 41.8    199       BMP:   Recent Labs     01/10/23  0430 01/11/23  0701    138   K 3.8 3.7   CO2 24 22   BUN 11 13   CREATININE 0.69* 0. 78   LABGLOM >60 >60   GLUCOSE 155* 155*       BNP: No results for input(s): BNP in the last 72 hours. PT/INR: No results for input(s): PROTIME, INR in the last 72 hours. APTT:  Recent Labs     01/10/23  2107 01/11/23  0307   APTT 72.8* 46.9*       CARDIAC ENZYMES:  No results for input(s): CKTOTAL, CKMB, CKMBINDEX, TROPONINI in the last 72 hours. FASTING LIPID PANEL:  Lab Results   Component Value Date/Time    HDL 26 01/09/2023 04:32 PM    LDLDIRECT 138 01/13/2022 09:40 AM    TRIG 135 01/09/2023 04:32 PM     LIVER PROFILE:  No results for input(s): AST, ALT, LABALBU in the last 72 hours.       IMPRESSION:    Patient Active Problem List   Diagnosis    PAD (peripheral artery disease) (HCC)    Subacute osteomyelitis of right tibia (HCC)    Acute metabolic encephalopathy    Abnormal CT of the head    Hepatitis    Acute cerebral infarction associated with systemic hypoxia or ischemia (HCC)    Multi infarct state    Carotid stenosis, bilateral    Right foot drop    Thrombocytosis    Acute CVA (cerebrovascular accident) ([address]    Moderate malnutrition ([address]    Cellulitis of right lower extremity    Right-sided extracranial carotid artery stenosis    Essential hypertension    Type 2 diabetes mellitus with diabetic neuropathy, without long-term current use of insulin (HCC)    Leukocytosis    Status post angiography of extremity    Ischemic gangrene ([address]    Pyogenic inflammation of bone (HCC)    Coronary artery disease    Above knee amputation of right lower extremity (HCC)    Pressure injury of right heel, unstageable (HCC)    Hx of AKA (above knee amputation), right ([address]    Erectile dysfunction associated with type 2 diabetes mellitus ([address]    Altered mental status    DKA, type 2, not at goal [facility])    Stenosis of right middle cerebral artery    At risk of seizures    Cardiomyopathy, ischemic    Acute ischemic right MCA stroke ([address]       RECOMMENDATIONS:  Altered mental status, concern for seizure  Subacute right temporal ischemic stroke  DKA  CAD s/p CABG x 4 grafts in 2019  PVD  Left ICA, vertebral artery stenosis, right ICA stenosis s/p stent  Ischemic cardiomyopathy, EF improved, last EF > 65%  LV apical thrombus  Systolic CHF with global hypokinesis, EF 35-40%  Elevated troponins, flat trend  Flash pulmonary edema  Hypertensive emergency    - continue heparin gtt for LV thrombus, will require AC at time of discharge  - will require cardiac cath during this admission for new drop in EF as soon as cleared by Neurology  -Continue IV diuresis for volume overload; lasix 40 mg BID.  - Continue ASA, statin daily    Ailyn Fang  PGY-3  Internal Medicine  [facility]  1/11/2023 11:29 AM    Attending Cardiologist Addendum: I have reviewed and performed the history, physical, subjective, objective, assessment, and plan with the student/resident/fellow/APN and agree with the note. I performed the history and physical personally. I have made changes to the note above as needed. Awaiting improvement in Neuro status, will need cardiac cath once improve neurologically. His HTN urgency has resolved  Continue IV diuresis    Discussed with patient in detail. All questions answered. Agrees with plan as outlined above. Thank you for allowing me to participate in the care of this patient, please do not hesitate to call if you have any questions. Prem Glez, , [facility], [address], [address] [facility]  [facility] [employer]  [phone]

## 2023-01-11 NOTE — PROGRESS NOTES
Patient's son, Andrés Ro, notified writer that he would not like anybody to visit the patient unless he is present. Patient's son also notified Felipe Jacques that he does not want any information about the patient to be given to the patient's friend, Giancarlojerrell Yair (person who found the patient with altered mental status), unless he personally notifies her. Writer informed Sahil Carranza that information has been given to patient's friend Chalo Mazariegos previously as she has provided patient identifiers. Writer reassured to Sahil Carranza that information would not be given to Chalo Mazariegos in the future. This information was relayed to the oncoming nurse as well as documented in the nurse handoff report.

## 2023-01-11 NOTE — PROGRESS NOTES
Neurology Nurse Practitioner Progress Note      INTERVAL HISTORY: This is a 61 y.o.  male admitted 1/7/2023 for altered mentation. This is a follow-up neurology progress note. The patient was examined and the chart was reviewed. Discussed with the RN. There were no acute events overnight. Pt was alert, unable to provide any history or orientation answers, said \"yes\" to almost every question, intermittent tangential and nonsensical speech, anomia; was able to follow simple commands after multiple prompts. HPI: Shaheed Sood is a 61 y.o. male with H/O PEA arrest x 2, s/p CABG x 4 (4/2019), ischemic cardiomyopathy, multi-infarct state, R toe osteomyelitis s/p R AKA (2009), HTN, HLD, DM, PAD, prior head trauma, osteolysis, osteomyelitis, who was admitted 1/7/2023 for altered mentation. Last known well was 2 days ago. Patient lives alone, apparently was able to complete his ADLs. Reportedly he was found on the ground, somewhat unresponsive and unable to speak properly as his speech was garbled. EMS were called who found him disheveled, awake but unable to provide any history; moving all limbs. His speech was garbled. His blood glucose was 250, was hypertensive 200's/100's and tachycardic 110's. Patient was brought to [facility] ED for evaluation. Neurology was consulted for encephalopathy. Patient is known to our service from his previous admission. 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 Pt s/p R ICA stenting (4/2019).      levETIRAcetam  500 mg IntraVENous Q12H    lacosamide (VIMPAT) IVPB  100 mg IntraVENous BID    insulin lispro  0-8 Units SubCUTAneous Q6H    famotidine (PEPCID) injection  20 mg IntraVENous BID    cefTRIAXone (ROCEPHIN) IV  1,000 mg IntraVENous Q24H    atorvastatin  40 mg Oral Nightly    aspirin  81 mg Oral Daily    therapeutic multivitamin-minerals  1 tablet Oral Daily with breakfast    sodium chloride flush  5-40 mL IntraVENous 2 times per day       Past Medical History:   Diagnosis Date    CAD (coronary artery disease) 04/07/2019    triple bypass    Cardiac arrest (HCC) 04/07/2019    Diabetes mellitus ([facility]) 04/2019    ON RX    Hyperlipidemia 04/2019    ON RX HAS NOT HAD SINCE 06/30/2019    Hypertension 04/2019    ON RX NONE SINCE 06/2019    Osteolysis 04/07/2019    R Knee hardware    PEA (Pulseless electrical activity) ([facility]) 04/07/2019    PVD (peripheral vascular disease) ([facility]) 04/07/2019    Wound, open 09/2019    RIGHT STUMP DRAINING MOD AMT BLOODY DRAINAGE       Past Surgical History:   Procedure Laterality Date    ABOVE KNEE AMPUTATION Right 09/28/2019    AMPUTATION ABOVE KNEE Right 9/28/2019    RIGHT ABOVE KNEE AMPUTATION performed by Audrey Box MD at [facility] OR    CARDIAC CATHETERIZATION  04/07/2019    MVD-v    CAROTID STENT PLACEMENT  07/03/2019    ACCULINK CAROTID STENT PLACED, MRI CONDITIONAL 5, 3T OK.     CORONARY ARTERY BYPASS GRAFT  04/07/2019    x4 per Dr. SHIREEN Moffett [place], [place] TRIPLE BYPASS    CORONARY ARTERY BYPASS GRAFT N/A 4/7/2019    EMERGENT CPR,  CORONARY ARTERY BYPASS X4, ON PUMP ,CHEST LEFT OPEN; SWAN, ANGELA PER ANESTHESIA performed by Johnathon Moffett MD at [facility] CVOR    FEMUR FRACTURE SURGERY Right 2000    FEMUR SURGERY      unsure if correct known tibial surgery    HARDWARE REMOVAL Right 04/07/2019    R Knee internal screw protruding through skin    HARDWARE REMOVAL Right 04/22/2019    HARDWARE REMOVAL RIGHT TIBIA,    LEG AMPUTATION BELOW KNEE Right 8/25/2019    RIGHT FOOT GUILLOTINE AMPUTATION performed by Audrey Box MD at [facility] OR    LEG AMPUTATION BELOW KNEE Right 8/27/2019    RIGHT BELOW KNEE AMPUTATION FORMALIZATION performed by Audrey Box MD at [facility] CVOR    RECONSTRUCTIVE REPAIR STERNAL N/A 4/8/2019    STERNAL  WOUND WASHOUT AND CLOSURE S/P CABG performed by Johnathon Moffett MD at [facility] CVOR    REMOVE HARDWARE FEMUR N/A 4/22/2019    HARDWARE REMOVAL RIGHT TIBIA, IRRIGATION  AND DEBRIDEMENT RIGH TIBIA performed by Pascual Tony MD at [facility] OR    TIBIA FRACTURE SURGERY Right 2000    Fell off roof, external fixation and internal hardware    TONSILLECTOMY  1974       PHYSICAL EXAM:    Blood pressure 136/73, pulse 57, temperature 97 °F (36.1 °C), resp. rate (!) 7, height 5' 6\" (1.676 m), weight 149 lb 11.1 oz (67.9 kg), SpO2 100 %.      ROS: Unable to perform due to patient's aphasia        Limited Neurological Examination:  Pt was alert, unable to provide any history or orientation answers, said \"yes\" to almost every question   He seemed confused  On BiPAP; difficult to understand speech but had intermittent tangential and nonsensical speech  Anomia & unable to repeat sentences  Unable to mimic  He was able to follow simple commands after multiple prompts  R AKA; was able to move rest of the limbs  DTRs - intact  L plantar - flexor response          DATA    Lab Results   Component Value Date    WBC 12.5 (H) 01/11/2023    RBC 4.53 01/11/2023    HGB 13.3 01/11/2023    HCT 41.8 01/11/2023     01/11/2023    ALT 18 01/07/2023    AST 16 01/07/2023     01/11/2023    K 3.7 01/11/2023    MG 1.7 01/11/2023    PHOS 3.3 01/11/2023     01/11/2023    AMMONIA 20 01/07/2023    CREATININE 0.78 01/11/2023    BUN 13 01/11/2023    CO2 22 01/11/2023    TSH 0.45 01/07/2023    INR 1.1 08/23/2022    [id number] 1994 (H) 04/17/2019    FOLATE 17.6 04/17/2019    LABA1C 12.7 (H) 01/09/2023    LABMICR 8 12/15/2020     Lab Results   Component Value Date    CHOL 114 01/09/2023    CHOL 242 (H) 01/13/2022    CHOL 109 12/15/2020     Lab Results   Component Value Date    TRIG 135 01/09/2023    TRIG 637 (H) 01/13/2022    TRIG 210 (H) 12/15/2020     Lab Results   Component Value Date    HDL 26 (L) 01/09/2023    HDL 24 (L) 01/13/2022    HDL 25 (L) 12/15/2020     Lab Results   Component Value Date    LDLCHOLESTEROL 61 01/09/2023    LDLCHOLESTEROL      01/13/2022    LDLCHOLESTEROL 42 12/15/2020     Lab  Results   Component Value Date    VLDL NOT REPORTED 01/13/2022    VLDL NOT REPORTED (H) 12/15/2020    VLDL NOT REPORTED 04/17/2019     Lab Results   Component Value Date    CHOLHDLRATIO 4.4 01/09/2023    CHOLHDLRATIO 10.1 (H) 01/13/2022    CHOLHDLRATIO 4.4 12/15/2020         DIAGNOSTIC DATA:  CT HEAD (1/7/2023): No acute intracranial abnormality        MRI BRAIN (1/8/2023): Tiny acute/subacute infarct in posteromedial R temporal periventricular white matter. Areas of encephalomalacia in b/l frontal & L parietal lobes; old trauma vs old infarcts. Old lacunar infarcts in R cerebellar hemisphere & R thalamus. CTA HEAD & NECK (1/7/2023):   1.  L ICA & L VA: chronic occlusion. 2.  R ICA, cervical: patent stent with mild-to-moderate stent stenosis. 3.  R MCA branches: multifocal areas of moderate-to-severe stenosis. 4.  R A2 & A3: multifocal severe stenoses. 5.  L subclavian artery: focal severe stenosis, 2/2 noncalcified atherosclerotic    plaque at the level of the L VA. ECHO (1/9/2023): EF 35-40%. Global hypokinesis, worse in the apex and all apical segments. Definity used to improve delineation of LV endocardial border. LV apical thrombus, 1.4 x 0.6 cm, is noted. Evidence of diastolic dysfunction. Trivial MR      EEG (1/9/2023): Abnormal continuous vEEG recording, the slowing suggests mild-moderate non specific encephalopathy. The presence of L anterior temporal lobe sharps and asymmetry suggests underlying lesion/dysfunction and increases patient risk for focal seizures      LTME (1/9 - 1/11/2023): Day 1 & 2: During this day of recording no events were recorded. The interictal EEG was abnormal due to diffuse polymorphic theta slowing suggesting moderate encephalopathy. Near continuous L anterior temporal sharp waves conferred an increased risk for focal onset seizures    Day 3: During this day of recording no events were recorded.  The interictal EEG was abnormal due to diffuse polymorphic theta slowing suggesting moderate encephalopathy. Previously seen left anterior temporal sharp waves appeared less frequent. These discharges conferred an increased risk for focal onset seizures                  IMPRESSION: 61 y.o.  male admitted with  Metabolic encephalopathy in the setting of DKA & HTN emergency; overnight flash pulmonary edema, requiring Lasix, nitro drip and BiPAP. MRI brain - tiny acute/subacute infarct in R temporal lobe. This infarct is not increasing his encephalopathy. LTME - near continuous L anterior temporal sharp waves, with no electrographic seizures. Pt was alert, unable to provide any history or orientation answers, said \"yes\" to almost every question, intermittent tangential and nonsensical speech, with anomia; was able to follow simple commands after multiple prompts; on BiPAP    Prior Hx multi-infarct state    CTA head/neck - multilevel stenosis. Hx chronic L ICA & L VA occlusion & s/p R ICA stenting (4/2019). No intervention at this time, as per neuro endovascular team    Acute on chronic ischemic cardiomyopathy with drop of EF; LV thrombus; is on heparin drip    Comorbid conditions - PEA arrest x 2, s/p CABG x 4 (4/2019), R toe osteomyelitis s/p R AKA (2009), HTN, HLD, DM, PAD, prior head trauma, osteolysis              PLAN:  Can repeat MRI brain tomorrow if pt stays with expressive aphasia    Continue ASA 81 mg QD, Lipitor 40 mg HS    Continue Keppra 500 mg IVPB Q12 Hr & Vimpat 100 mg IVPB Q12 Hr    Case was discussed with Dr. Roby Velazquez; patient has multiple risk factors for further strokes, however he is neurologically clear for cardiac cath    Will follow    Please note that this note was generated using a voice recognition dictation software. Although every effort was made to ensure the accuracy of this automated transcription, some errors in transcription may have occurred.

## 2023-01-12 ENCOUNTER — APPOINTMENT (OUTPATIENT)
Dept: GENERAL RADIOLOGY | Age: 58
DRG: 405 | End: 2023-01-12
Payer: MEDICAID

## 2023-01-12 LAB
ABSOLUTE EOS #: 0.08 K/UL (ref 0–0.44)
ABSOLUTE IMMATURE GRANULOCYTE: 0.07 K/UL (ref 0–0.3)
ABSOLUTE LYMPH #: 2.77 K/UL (ref 1.1–3.7)
ABSOLUTE MONO #: 1.18 K/UL (ref 0.1–1.2)
ALLEN TEST: POSITIVE
AMMONIA: 39 UMOL/L (ref 16–60)
ANION GAP SERPL CALCULATED.3IONS-SCNC: 16 MMOL/L (ref 9–17)
BASOPHILS # BLD: 0 % (ref 0–2)
BASOPHILS ABSOLUTE: 0.04 K/UL (ref 0–0.2)
BUN BLDV-MCNC: 12 MG/DL (ref 6–20)
CALCIUM SERPL-MCNC: 8.2 MG/DL (ref 8.6–10.4)
CHLORIDE BLD-SCNC: 100 MMOL/L (ref 98–107)
CO2: 21 MMOL/L (ref 20–31)
CREAT SERPL-MCNC: 0.86 MG/DL (ref 0.7–1.2)
CULTURE: NORMAL
CULTURE: NORMAL
EOSINOPHILS RELATIVE PERCENT: 1 % (ref 1–4)
FIO2: 4
GFR SERPL CREATININE-BSD FRML MDRD: >60 ML/MIN/1.73M2
GLUCOSE BLD-MCNC: 177 MG/DL (ref 75–110)
GLUCOSE BLD-MCNC: 183 MG/DL (ref 70–99)
GLUCOSE BLD-MCNC: 202 MG/DL (ref 74–100)
GLUCOSE BLD-MCNC: 208 MG/DL (ref 75–110)
GLUCOSE BLD-MCNC: 224 MG/DL (ref 75–110)
GLUCOSE BLD-MCNC: 257 MG/DL (ref 75–110)
HCT VFR BLD CALC: 40.9 % (ref 40.7–50.3)
HEMOGLOBIN: 13.3 G/DL (ref 13–17)
IMMATURE GRANULOCYTES: 1 %
LYMPHOCYTES # BLD: 18 % (ref 24–43)
Lab: NORMAL
Lab: NORMAL
MAGNESIUM: 1.5 MG/DL (ref 1.6–2.6)
MCH RBC QN AUTO: 29.9 PG (ref 25.2–33.5)
MCHC RBC AUTO-ENTMCNC: 32.5 G/DL (ref 28.4–34.8)
MCV RBC AUTO: 91.9 FL (ref 82.6–102.9)
MONOCYTES # BLD: 8 % (ref 3–12)
NRBC AUTOMATED: 0 PER 100 WBC
PARTIAL THROMBOPLASTIN TIME: 45.2 SEC (ref 20.5–30.5)
PARTIAL THROMBOPLASTIN TIME: 47.1 SEC (ref 20.5–30.5)
PARTIAL THROMBOPLASTIN TIME: 54.1 SEC (ref 20.5–30.5)
PDW BLD-RTO: 13.1 % (ref 11.8–14.4)
PHOSPHORUS: 2.3 MG/DL (ref 2.5–4.5)
PLATELET # BLD: 221 K/UL (ref 138–453)
PMV BLD AUTO: 11.1 FL (ref 8.1–13.5)
POC HCO3: 25.9 MMOL/L (ref 21–28)
POC LACTIC ACID: 0.73 MMOL/L (ref 0.56–1.39)
POC O2 SATURATION: 94 % (ref 94–98)
POC PCO2: 36.6 MM HG (ref 35–48)
POC PH: 7.46 (ref 7.35–7.45)
POC PO2: 64.6 MM HG (ref 83–108)
POSITIVE BASE EXCESS, ART: 2 (ref 0–3)
POTASSIUM SERPL-SCNC: 3 MMOL/L (ref 3.7–5.3)
POTASSIUM SERPL-SCNC: 3.8 MMOL/L (ref 3.7–5.3)
POTASSIUM SERPL-SCNC: 4.2 MMOL/L (ref 3.7–5.3)
PRO-BNP: 1634 PG/ML
RBC # BLD: 4.45 M/UL (ref 4.21–5.77)
SAMPLE SITE: ABNORMAL
SEG NEUTROPHILS: 72 % (ref 36–65)
SEGMENTED NEUTROPHILS ABSOLUTE COUNT: 10.96 K/UL (ref 1.5–8.1)
SODIUM BLD-SCNC: 137 MMOL/L (ref 135–144)
SPECIMEN DESCRIPTION: NORMAL
SPECIMEN DESCRIPTION: NORMAL
TROPONIN, HIGH SENSITIVITY: 118 NG/L (ref 0–22)
WBC # BLD: 15.1 K/UL (ref 3.5–11.3)

## 2023-01-12 PROCEDURE — 84132 ASSAY OF SERUM POTASSIUM: CPT

## 2023-01-12 PROCEDURE — 99232 SBSQ HOSP IP/OBS MODERATE 35: CPT | Performed by: PSYCHIATRY & NEUROLOGY

## 2023-01-12 PROCEDURE — 85025 COMPLETE CBC W/AUTO DIFF WBC: CPT

## 2023-01-12 PROCEDURE — 83735 ASSAY OF MAGNESIUM: CPT

## 2023-01-12 PROCEDURE — 2580000003 HC RX 258

## 2023-01-12 PROCEDURE — 6360000002 HC RX W HCPCS

## 2023-01-12 PROCEDURE — 84484 ASSAY OF TROPONIN QUANT: CPT

## 2023-01-12 PROCEDURE — 2500000003 HC RX 250 WO HCPCS: Performed by: STUDENT IN AN ORGANIZED HEALTH CARE EDUCATION/TRAINING PROGRAM

## 2023-01-12 PROCEDURE — 36600 WITHDRAWAL OF ARTERIAL BLOOD: CPT

## 2023-01-12 PROCEDURE — 83880 ASSAY OF NATRIURETIC PEPTIDE: CPT

## 2023-01-12 PROCEDURE — 36415 COLL VENOUS BLD VENIPUNCTURE: CPT

## 2023-01-12 PROCEDURE — 99291 CRITICAL CARE FIRST HOUR: CPT | Performed by: INTERNAL MEDICINE

## 2023-01-12 PROCEDURE — 6370000000 HC RX 637 (ALT 250 FOR IP): Performed by: STUDENT IN AN ORGANIZED HEALTH CARE EDUCATION/TRAINING PROGRAM

## 2023-01-12 PROCEDURE — 6370000000 HC RX 637 (ALT 250 FOR IP): Performed by: INTERNAL MEDICINE

## 2023-01-12 PROCEDURE — 82803 BLOOD GASES ANY COMBINATION: CPT

## 2023-01-12 PROCEDURE — 97530 THERAPEUTIC ACTIVITIES: CPT

## 2023-01-12 PROCEDURE — 97167 OT EVAL HIGH COMPLEX 60 MIN: CPT

## 2023-01-12 PROCEDURE — 85730 THROMBOPLASTIN TIME PARTIAL: CPT

## 2023-01-12 PROCEDURE — 82947 ASSAY GLUCOSE BLOOD QUANT: CPT

## 2023-01-12 PROCEDURE — 6370000000 HC RX 637 (ALT 250 FOR IP)

## 2023-01-12 PROCEDURE — 80048 BASIC METABOLIC PNL TOTAL CA: CPT

## 2023-01-12 PROCEDURE — 2500000003 HC RX 250 WO HCPCS

## 2023-01-12 PROCEDURE — G0108 DIAB MANAGE TRN  PER INDIV: HCPCS

## 2023-01-12 PROCEDURE — 6360000002 HC RX W HCPCS: Performed by: STUDENT IN AN ORGANIZED HEALTH CARE EDUCATION/TRAINING PROGRAM

## 2023-01-12 PROCEDURE — 71045 X-RAY EXAM CHEST 1 VIEW: CPT

## 2023-01-12 PROCEDURE — 2000000000 HC ICU R&B

## 2023-01-12 PROCEDURE — 84100 ASSAY OF PHOSPHORUS: CPT

## 2023-01-12 PROCEDURE — 97163 PT EVAL HIGH COMPLEX 45 MIN: CPT

## 2023-01-12 PROCEDURE — 2580000003 HC RX 258: Performed by: STUDENT IN AN ORGANIZED HEALTH CARE EDUCATION/TRAINING PROGRAM

## 2023-01-12 PROCEDURE — APPSS30 APP SPLIT SHARED TIME 16-30 MINUTES: Performed by: NURSE PRACTITIONER

## 2023-01-12 PROCEDURE — C9254 INJECTION, LACOSAMIDE: HCPCS

## 2023-01-12 PROCEDURE — 83605 ASSAY OF LACTIC ACID: CPT

## 2023-01-12 PROCEDURE — 82140 ASSAY OF AMMONIA: CPT

## 2023-01-12 RX ORDER — CARVEDILOL 6.25 MG/1
6.25 TABLET ORAL 2 TIMES DAILY WITH MEALS
Status: DISCONTINUED | OUTPATIENT
Start: 2023-01-12 | End: 2023-01-12

## 2023-01-12 RX ORDER — LISINOPRIL 20 MG/1
20 TABLET ORAL DAILY
Status: DISCONTINUED | OUTPATIENT
Start: 2023-01-12 | End: 2023-01-22 | Stop reason: HOSPADM

## 2023-01-12 RX ORDER — CARVEDILOL 12.5 MG/1
12.5 TABLET ORAL 2 TIMES DAILY WITH MEALS
Status: DISCONTINUED | OUTPATIENT
Start: 2023-01-12 | End: 2023-01-18

## 2023-01-12 RX ORDER — CARVEDILOL 6.25 MG/1
6.25 TABLET ORAL ONCE
Status: COMPLETED | OUTPATIENT
Start: 2023-01-12 | End: 2023-01-12

## 2023-01-12 RX ORDER — POTASSIUM CHLORIDE 7.45 MG/ML
10 INJECTION INTRAVENOUS
Status: ACTIVE | OUTPATIENT
Start: 2023-01-12 | End: 2023-01-12

## 2023-01-12 RX ADMIN — CARVEDILOL 6.25 MG: 6.25 TABLET, FILM COATED ORAL at 11:30

## 2023-01-12 RX ADMIN — SODIUM CHLORIDE: 9 INJECTION, SOLUTION INTRAVENOUS at 21:04

## 2023-01-12 RX ADMIN — POTASSIUM CHLORIDE 10 MEQ: 7.46 INJECTION, SOLUTION INTRAVENOUS at 05:36

## 2023-01-12 RX ADMIN — LISINOPRIL 20 MG: 20 TABLET ORAL at 14:56

## 2023-01-12 RX ADMIN — LACOSAMIDE 100 MG: 10 INJECTION INTRAVENOUS at 10:52

## 2023-01-12 RX ADMIN — ASPIRIN 81 MG: 81 TABLET, CHEWABLE ORAL at 09:26

## 2023-01-12 RX ADMIN — CARVEDILOL 6.25 MG: 6.25 TABLET, FILM COATED ORAL at 13:47

## 2023-01-12 RX ADMIN — MAGNESIUM SULFATE HEPTAHYDRATE 1000 MG: 1 INJECTION, SOLUTION INTRAVENOUS at 13:59

## 2023-01-12 RX ADMIN — DESMOPRESSIN ACETATE 40 MG: 0.2 TABLET ORAL at 20:27

## 2023-01-12 RX ADMIN — SODIUM CHLORIDE, PRESERVATIVE FREE 10 ML: 5 INJECTION INTRAVENOUS at 20:32

## 2023-01-12 RX ADMIN — FUROSEMIDE 40 MG: 10 INJECTION, SOLUTION INTRAMUSCULAR; INTRAVENOUS at 10:30

## 2023-01-12 RX ADMIN — SODIUM CHLORIDE, PRESERVATIVE FREE 20 MG: 5 INJECTION INTRAVENOUS at 20:30

## 2023-01-12 RX ADMIN — HEPARIN SODIUM 26 UNITS/KG/HR: 10000 INJECTION, SOLUTION INTRAVENOUS at 15:11

## 2023-01-12 RX ADMIN — MAGNESIUM SULFATE HEPTAHYDRATE 1000 MG: 1 INJECTION, SOLUTION INTRAVENOUS at 16:52

## 2023-01-12 RX ADMIN — SODIUM CHLORIDE, PRESERVATIVE FREE 20 MG: 5 INJECTION INTRAVENOUS at 10:30

## 2023-01-12 RX ADMIN — POTASSIUM BICARBONATE 40 MEQ: 782 TABLET, EFFERVESCENT ORAL at 09:32

## 2023-01-12 RX ADMIN — POTASSIUM CHLORIDE 10 MEQ: 7.46 INJECTION, SOLUTION INTRAVENOUS at 11:56

## 2023-01-12 RX ADMIN — POTASSIUM CHLORIDE 10 MEQ: 7.46 INJECTION, SOLUTION INTRAVENOUS at 10:49

## 2023-01-12 RX ADMIN — DEXMEDETOMIDINE HYDROCHLORIDE 0.6 MCG/KG/HR: 4 INJECTION, SOLUTION INTRAVENOUS at 02:45

## 2023-01-12 RX ADMIN — HEPARIN SODIUM 2000 UNITS: 1000 INJECTION INTRAVENOUS; SUBCUTANEOUS at 12:04

## 2023-01-12 RX ADMIN — INSULIN LISPRO 2 UNITS: 100 INJECTION, SOLUTION INTRAVENOUS; SUBCUTANEOUS at 20:21

## 2023-01-12 RX ADMIN — POTASSIUM CHLORIDE 10 MEQ: 7.46 INJECTION, SOLUTION INTRAVENOUS at 09:06

## 2023-01-12 RX ADMIN — INSULIN LISPRO 2 UNITS: 100 INJECTION, SOLUTION INTRAVENOUS; SUBCUTANEOUS at 13:42

## 2023-01-12 RX ADMIN — LEVETIRACETAM 500 MG: 5 INJECTION INTRAVENOUS at 23:30

## 2023-01-12 RX ADMIN — CARVEDILOL 12.5 MG: 12.5 TABLET, FILM COATED ORAL at 18:39

## 2023-01-12 RX ADMIN — LISINOPRIL 5 MG: 5 TABLET ORAL at 09:26

## 2023-01-12 RX ADMIN — INSULIN LISPRO 4 UNITS: 100 INJECTION, SOLUTION INTRAVENOUS; SUBCUTANEOUS at 09:43

## 2023-01-12 RX ADMIN — SODIUM CHLORIDE, PRESERVATIVE FREE 10 ML: 5 INJECTION INTRAVENOUS at 09:27

## 2023-01-12 RX ADMIN — LEVETIRACETAM 500 MG: 5 INJECTION INTRAVENOUS at 11:29

## 2023-01-12 RX ADMIN — CEFTRIAXONE SODIUM 1000 MG: 10 INJECTION, POWDER, FOR SOLUTION INTRAVENOUS at 10:40

## 2023-01-12 RX ADMIN — NITROGLYCERIN 50 MCG/MIN: 20 INJECTION INTRAVENOUS at 06:34

## 2023-01-12 RX ADMIN — FUROSEMIDE 40 MG: 10 INJECTION, SOLUTION INTRAMUSCULAR; INTRAVENOUS at 18:39

## 2023-01-12 RX ADMIN — LACOSAMIDE 100 MG: 10 INJECTION INTRAVENOUS at 21:05

## 2023-01-12 RX ADMIN — POTASSIUM CHLORIDE 10 MEQ: 7.46 INJECTION, SOLUTION INTRAVENOUS at 06:37

## 2023-01-12 RX ADMIN — Medication 1 TABLET: at 09:26

## 2023-01-12 ASSESSMENT — PAIN SCALES - GENERAL
PAINLEVEL_OUTOF10: 0

## 2023-01-12 NOTE — PROGRESS NOTES
Endovascular Neurosurgery Progress Note    SUBJECTIVE:   Much improved this AM, talking happily at bedside, no complaints overnight    Still on nitro drip    Review of Systems:  CONSTITUTIONAL:  negative for fevers, chills, fatigue and malaise    EYES:  negative for double vision, blurred vision and photophobia     HEENT:  negative for tinnitus, epistaxis and sore throat    RESPIRATORY:  negative for cough, shortness of breath, wheezing    CARDIOVASCULAR:  negative for chest pain, palpitations, syncope, edema    GASTROINTESTINAL:  negative for nausea, vomiting    GENITOURINARY:  negative for incontinence    MUSCULOSKELETAL:  negative for neck or back pain    NEUROLOGICAL:  Negative for weakness and tingling  negative for headaches and dizziness    PSYCHIATRIC:  negative for anxiety      Review of systems otherwise negative. OBJECTIVE:     Vitals:    23 1415   BP: (!) 183/82   Pulse: (!) 108   Resp: 15   Temp:    SpO2: 96%        General:  Gen: normal habitus, NAD  HEENT: NCAT, mucosa moist  Cvs: RRR, S1 S2 normal  Resp: symmetric unlabored breathing  Abd: s/nd/nt  Ext: no edema  Skin: no lesions seen, warm and dry    Neuro:  Gen: somnolence, on precedex drip  Lang/speech: somonolence  CN: PERRL, EOMI, VFF, V1-3 intact, mild right face droop, hearing intact  Motor: grossly 5/5 UE and LE on the left, right arm 4/5, right leg amputated above knee 1/5  Sense: decrease on the left  Coord: deferred  DTR: deferred  Gait: deferred    NIH Stroke Scale:   1a  Level of consciousness: 0   1b. LOC questions:  0 - answers both questions correctly   1c. LOC commands: 0 - performs both tasks correctly   2. Best Gaze: 0 - normal   3. Visual: 0 - no visual loss   4. Facial Palsy: 1 - minor paralysis (flattened nasolabial fold, asymmetric on smiling)   5a. Motor left arm: 0 - no drift, limb holds 90 (or 45) degrees for full 10 seconds   5b. Motor right arm: 1   6a.  Motor left le - no drift; leg holds 30 degree position for full 5 seconds   6b  Motor right leg:  3 - no effort against gravity; leg falls to bed immediately (pt has R AKA)   7. Limb Ataxia: 0 - absent   8. Sensory: 1 - mild to moderate sensory loss; patient feels pinprick is less sharp or is dull on the affected side; there is a loss of superficial pain with pinprick but patient is aware of being touched    9. Best Language:  0   10. Dysarthria: 0 - normal   11. Extinction and Inattention: 0 - no abnormality         Total:   6     MRS: 05      LABS:   Reviewed. Lab Results   Component Value Date    HGB 13.3 01/12/2023    WBC 15.1 (H) 01/12/2023     01/12/2023     01/12/2023    BUN 12 01/12/2023    CREATININE 0.86 01/12/2023    AST 16 01/07/2023    ALT 18 01/07/2023    MG 1.5 (L) 01/12/2023    APTT 47.1 (H) 01/12/2023    INR 1.1 08/23/2022      No results found for: COVID19    RADIOLOGY:   Images were personally reviewed including:   Cerebral angiogram 7/3/2019:  1. Right proximal immediately after the origin cervical ICA atherosclerotic changes resulting into a focal area of stenosis measuring approximately 65% per NASCET criteria. 2.  The above mentioned stenosis was treated with post balloon angioplasty and stenting using a 7-10x 30 mm Acculink stent with no significant residual stenosis. 3.  Right common femoral artery severe stenosis with good collaterals. CTA head neck 1/7/23: R ICA Acculink stent patent and L ICA chronic occlusion seen before 2019. Dominant R VA and chronically occluded L VA       MRI brain w/o contrast 1/8/23: punctate right temporal periventricular stroke. 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  Patient presented to the ED with AMS and found to have glucose of 620 and possible sepsis. Endovascular consulted for chronic L ICA and L VA occlusion and hx of R ICA stent in 2019. Neuro exam was stable  PLAN:   - no intervention, CTA is stable and L ICA and L VA occlusion are chronic and known before 2019  - R ICA stent is patent  - patient has a punctate right temporal periventricular stroke, he is currently already on aspirin, recommend continue it  - patient is on heparin for ACS, cardiology and ICU managing it  - ICU management   - PT OT and SLP       Case discussed with Dr. Tyler Osuna attending.     Raymond Ahn MD  Stroke, [facility]  [id number]  Electronically signed 1/12/2023 at 3:18 PM

## 2023-01-12 NOTE — PROGRESS NOTES
[facility] Cardiology Cardiology    Progress note               Today's Date: 1/12/2023  Patient Name: Kalin Aragon  Date of admission: 1/7/2023  4:22 PM  Patient's age: 61 y.o., 1965  Admission Dx: Altered mental status [R41.82]  DKA, type 2, not at goal [facility]) [E11.10]  Altered mental status, unspecified altered mental status type [R41.82]    Reason for Consult:  Cardiac evaluation    Requesting Physician: Elder Stuart MD    CHIEF COMPLAINT: Altered mental status    History Obtained From:  electronic medical record    SUBJECTIVE:  Tachycardic overnight, given 1 dose of 5 mg IV lopressor  Started on lasix 40 mg IV BID, lisinopril 5 mg daily. On nitro gtt this am, SBP 150s  Patient has receptive aphasia, unable to explain cath to him this am  Hypokalemic K 3.0, mag 1.5      HISTORY OF PRESENT ILLNESS:      The patient is a 61 y.o.  male who is admitted to the hospital for management of altered mental status    Cardiology consulted for abnormal EKG    Patient has medical history of peripheral vascular disease status post AKA, CAD status post CABG in 2019, ischemic cardiomyopathy with EF of 40%, diabetes mellitus type 2, history of CVA    Presented to the ED with altered mental status. CT head was negative. CTA head and neck showed chronic occlusion of left ICA and vertebral arteries, focal severe stenosis of left subclavian artery, multifocal stenosis of MCA, right TOAN. MRI brain showed tiny acute to subacute infarction of the right temporal region, malacia of bilateral pontine parietal lobes.     As per chart review at baseline he is normal and completes all his daily activities independently and was found altered    On arrival found to be hypertensive, tachycardic  ALMITA, DKA  Admitted to medical ICU  EKG the ED showed mild ST elevations in V1 to V3, started on heparin infusion  DKA resolved    Troponin flat 25> 30> 30> 32  Repeat EKG yesterday showing LVH, non specific ST changes in anterior septal leads   On heparin infusion, on aspirin and statin    Neurology on board  Endovascular on board for occlusions of ICA and vertebral artery no intervention planned    Last echo in august 2019, EF > 65%, trivial pericardial effusion  Last cath in April 2019, showed MVD, CABG in April 2019      Past Medical History:   has a past medical history of CAD (coronary artery disease), Cardiac arrest ([facility] [address] Diabetes mellitus ([facility] [address] Hyperlipidemia, Hypertension, Osteolysis, PEA (Pulseless electrical activity) ([facility] [address] PVD (peripheral vascular disease) ([facility] [address] and Wound, open. Past Surgical History:   has a past surgical history that includes Femur fracture surgery (Right, 2000); Cardiac catheterization (04/07/2019); Femur Surgery; Tonsillectomy (1974); Coronary artery bypass graft (04/07/2019); Hardware Removal (Right, 04/07/2019); Tibia fracture surgery (Right, 2000); Coronary artery bypass graft (N/A, 4/7/2019); RECONSTRUCTIVE REPAIR STERNAL (N/A, 4/8/2019); Hardware Removal (Right, 04/22/2019); REMOVE HARDWARE FEMUR (N/A, 4/22/2019); Carotid stent placement (07/03/2019); Leg amputation below knee (Right, 8/25/2019); Leg amputation below knee (Right, 8/27/2019); above knee amputation (Right, 09/28/2019); and AMPUTATION ABOVE KNEE (Right, 9/28/2019). Home Medications:    Prior to Admission medications    Medication Sig Start Date End Date Taking?  Authorizing Provider   metoprolol tartrate (LOPRESSOR) 25 MG tablet Take 2 tablets by mouth 2 times daily 8/24/22   Jana Salazar MD   clopidogrel (PLAVIX) 75 MG tablet TAKE 1 TABLET BY MOUTH DAILY 2/17/22   Benny Coulter MD   gabapentin (NEURONTIN) 300 MG capsule TAKE 1 CAPSULE BY MOUTH EVERY 8 HOURS 2/17/22 3/19/22  Benny Coulter MD   metFORMIN (GLUCOPHAGE) 1000 MG tablet Take 1 tablet by mouth 2 times daily (with meals) 1/13/22 3/14/22  Benny Coulter MD   insulin lispro, 1 Unit Dial, (HUMALOG KWIKPEN) 100 UNIT/ML SOPN INJECT SUBCUTANEOUSLY BEFORE MEALS AS DIRECTED PER SLIDING SCALE: SEE ATTACHED FOR SLIDING SCALE DIRECTIONS: Hold all insulin and notify Dr immediately if BG <60. -200: 2 UNITS; -250: 4UNITS; 251-300 6 UNITS; 301-350: 8 UNITS  Patient not taking: Reported on 1/13/2022 1/5/22   Jaqueline Jones MD   lisinopril-hydroCHLOROthiazide (PRINZIDE;ZESTORETIC) 20-25 MG per tablet Take 2 tablets by mouth daily .   Take 1 pill if blood pressure is less than 130/80 12/23/21   Jaqueline Jones MD   insulin glargine (LANTUS SOLOSTAR) 100 UNIT/ML injection pen Inject 24 Units into the skin nightly 11/9/21 1/13/22  Ginny Beaver MD   Insulin Syringe-Needle U-100 30G X 5/16\" 1 ML MISC 1 each by Does not apply route daily 11/9/21   Ginny Beaver MD   atorvastatin (LIPITOR) 40 MG tablet Take 1 tablet by mouth nightly 10/19/21   Wally Ayala MD   metoprolol tartrate (LOPRESSOR) 50 MG tablet Take 1 tablet by mouth 2 times daily 10/19/21   Wally Ayala MD   blood glucose test strips (ONETOUCH VERIO) strip TEST 2 TIMES A DAY & AS NEEDED FOR SYMPTOMS OF IRREGULAR BLOOD GLUCOSE. 5/28/21   Betty Pérez MD   Alcohol Swabs (B-D SINGLE USE SWABS REGULAR) PADS USE AS DIRECTED WITH TESTING SUPPLIES 5/28/21   Betty Pérez MD   Lancets ([address]) [facility] USE AS DIRECTED TO TEST BLOOD SUGAR 5/28/21   Betty Pérez MD   sildenafil (VIAGRA) 100 MG tablet Take 1 tablet by mouth as needed for Erectile Dysfunction 1/7/21   Betty Pérez MD   Blood Glucose Monitoring Suppl (ONE TOUCH ULTRA 2) w/Device KIT U UTD ONCE D 9/27/19   Historical Provider, MD   acetaminophen (TYLENOL) 500 MG tablet Take 2 tablets by mouth every 8 hours as needed for Pain 9/30/19   Cookie Kang DO   Handicap Placard [address] [facility] by Does not apply route For 5 years 9/27/19   Betty Pérez MD   Blood Glucose Monitoring Suppl [facility] BLOOD GLUCOSE METER) w/Device KIT 1 kit by Does not apply route once for 1 dose 9/27/19 9/27/19 March MD Beto   Insulin Pen Needle 32G X 6 MM MISC 1 each by Does not apply route daily 9/27/19   Ivy Faust MD   furosemide (LASIX) 20 MG tablet Take 1 tablet by mouth daily  Patient not taking: Reported on 11/16/2021 7/4/19   ISAAC Wynne - CNP   aspirin 81 MG chewable tablet Take 1 tablet by mouth daily 5/10/19   Luba Anand MD   Multiple Vitamins-Minerals (THERAPEUTIC MULTIVITAMIN-MINERALS) tablet Take 1 tablet by mouth daily (with breakfast) 5/10/19   Luba Anand MD       Allergies:  Patient has no known allergies. Social History:   reports that he has been smoking cigarettes. He started smoking about 2 years ago. He has a 20.00 pack-year smoking history. He has never used smokeless tobacco. He reports that he does not drink alcohol and does not use drugs. Family History: family history includes Diabetes in his brother, father, and mother; Heart Disease in his brother, father, and mother; High Blood Pressure in his brother, father, and sister; Stroke in his father and mother. REVIEW OF SYSTEMS:  could not obtained due patient being altered      PHYSICAL EXAM:      BP (!) 119/57   Pulse 75   Temp 97.6 °F (36.4 °C) (Axillary)   Resp 28   Ht 5' 6\" (1.676 m)   Wt 149 lb 11.1 oz (67.9 kg)   SpO2 93%   BMI 24.91 kg/m²    Constitutional and General Appearance: awake and alert, altered  HEENT: PERRL  Respiratory:  Clear auscultation  Cardiovascular:  Normal heart sounds  Abdomen:   soft  Extremities:   Lower extremity edema: No  Neurological:  Alert and oriented. Moves all extremities well    DATA:    Diagnostics:    EKG: non specific ST changes and LVH. ECHO: ordered, but not yet obtained. Stress Test: not obtained. Cardiac Angiography: not obtained.     Labs:     CBC:   Recent Labs     01/11/23  0701 01/12/23  0346   WBC 12.5* 15.1*   HGB 13.3 13.3   HCT 41.8 40.9    221       BMP:   Recent Labs     01/11/23  0701 01/12/23  0346    137   K 3.7 3.0*   CO2 22 21   BUN 13 12   CREATININE 0.78 0.86   LABGLOM >60 >60 GLUCOSE 155* 183*       BNP: No results for input(s): BNP in the last 72 hours. PT/INR: No results for input(s): PROTIME, INR in the last 72 hours. APTT:  Recent Labs     01/11/23  1628 01/12/23  0346   APTT 50.3* 45.2*       CARDIAC ENZYMES:  No results for input(s): CKTOTAL, CKMB, CKMBINDEX, TROPONINI in the last 72 hours. FASTING LIPID PANEL:  Lab Results   Component Value Date/Time    HDL 26 01/09/2023 04:32 PM    LDLDIRECT 138 01/13/2022 09:40 AM    TRIG 135 01/09/2023 04:32 PM     LIVER PROFILE:  No results for input(s): AST, ALT, LABALBU in the last 72 hours.       IMPRESSION:    Patient Active Problem List   Diagnosis    PAD (peripheral artery disease) (HCC)    Subacute osteomyelitis of right tibia (HCC)    Acute metabolic encephalopathy    Abnormal CT of the head    Hepatitis    Cerebral multi-infarct state    Multi infarct state    Carotid stenosis, bilateral    Right foot drop    Thrombocytosis    Acute CVA (cerebrovascular accident) ([address]    Moderate malnutrition ([address]    Cellulitis of right lower extremity    Right-sided extracranial carotid artery stenosis    Essential hypertension    Type 2 diabetes mellitus with diabetic neuropathy, without long-term current use of insulin (HCC)    Leukocytosis    Status post angiography of extremity    Ischemic gangrene ([address]    Pyogenic inflammation of bone (HCC)    Coronary artery disease    Above knee amputation of right lower extremity (HCC)    Pressure injury of right heel, unstageable (HCC)    Hx of AKA (above knee amputation), right (HCC)    Erectile dysfunction associated with type 2 diabetes mellitus ([address]    Altered mental status    DKA, type 2, not at goal [facility])    Stenosis of right middle cerebral artery    At risk of seizures    Cardiomyopathy, ischemic    Acute ischemic right MCA stroke ([address]       RECOMMENDATIONS:  Altered mental status, has receptive aphasia at baseline  Subacute right temporal ischemic stroke  DKA  CAD s/p CABG x 4 grafts in 2019  PVD  Left ICA, vertebral artery stenosis, right ICA stenosis s/p stent  Ischemic cardiomyopathy, EF improved, last EF > 65%  LV apical thrombus  Systolic CHF with global hypokinesis, EF 35-40%  Elevated troponins, flat trend  Flash pulmonary edema  Hypertensive emergency    - continue heparin gtt for LV thrombus, will require AC at time of discharge  - Plan for cardiac cath tomorrow am; NPO after midnight  -Continue IV diuresis for volume overload; lasix 40 mg BID. -Started on coreg 12.5 mg BID  - Continue ASA, statin daily  -Wean off nitroglycerine gtt as tolerated. Start lisinopril 20 mg daily. Allyne Bumpers  PGY-3  Internal Medicine  [address]  1/12/2023 10:54 AM    Attending Cardiologist Addendum: I have reviewed and performed the history, physical, subjective, objective, assessment, and plan with the student/resident/fellow/APN and agree with the note. I performed the history and physical personally. I have made changes to the note above as needed. He is awake  Per neuro cleared for cath tomorrow  Has expressive aphasia  Family at bedside, agreeable for cath  Will increase coreg and lisinopril  Will try to wean off nitro drip  Keep npo at [place]  Agree with diuresis    Discussed with patient in detail. All questions answered. Agrees with plan as outlined above. Thank you for allowing me to participate in the care of this patient, please do not hesitate to call if you have any questions. Tucker Cooper DO, [facility], [address], [address] [facility]  [facility]. [facility]  [phone]

## 2023-01-12 NOTE — CARE COORDINATION
Transitional planning. Spoke to Catarina with [facility] [phone], they received referral. Tory Callejas will complete on-site visit. She is aware of pt condition and sitter in room. Sitter in room, LTME, 4 L NC, Nitro, Heparin, Precedex gtts. [facility] following. Pt's son will provide additional SNF choices. Needs PT/OT notes. Per pt's son: NO INFORMATION IS TO BE GIVEN TO ANYONE NOT ON EMERGENCY CONTACT LIST    1011 pt's RN informed CM that pt's son is in pt's room and would like to speak to CM. Reviewed SNF list pt's son, Elvis Leslie had. He provided additional choices. Referrals faxed to:   [place] [facility] remains 1st choice if [facility] can't take  pt. No other order of choice provided.

## 2023-01-12 NOTE — PROGRESS NOTES
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 Patient presented to the ED with altered mental status with last known well two days prior. Patient was found to be hypertensive with -250, tachycardic with HR 110s. Patient appeared disheveled. EKG showed ST elevation in V1-V3 with mild elevation in V4, cardiology was consulted, troponins were downtrending and they recommended heparin infusion. Patient glucose was 620 with beta hydroxy butyrate 2.43 and anion gap 26. Patient was started on insulin gtt. Initial Labs in ED:  Cr 1.22  Lactic 2.3  Glucose 620  BHB 2.43  Anion Gap 26  Bicarb 22     pH 7.35 / 52.3 / 28.8 / 52.6  LFT unremarkable  TSH normal  WBC 19.7  UA negative for UTI     CT head negative  CXR unremarkable     CTA HEAD AND NECK  1. 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 2.  Patent right cervical internal carotid artery stent demonstrates   mild-to-moderate in stent stenosis. Follow-up catheter angiogram may be   helpful for further evaluation. 3.  Multifocal areas of moderate to severe stenosis in the right middle   cerebral artery branches. 4.  Multifocal severe stenoses in the right anterior cerebral artery A2 and   A3 segments. 5.  Focal severe stenosis in the left subclavian artery secondary to   noncalcified atherosclerotic plaque at the level of the left vertebral artery   origin. SUBJECTIVE:     OVERNIGHT EVENTS:    Tachycardic, got lopressor x 1. Precedex briefly restarted.      AWAKE & FOLLOWING COMMANDS:  [] No   [x] Yes    SECRETIONS Amount:  [x] Small [] Moderate  [] Large  [] None  Color:     [x] White [] Colored  [] Bloody    SEDATION:  RAAS Score:  [] Propofol gtt  [] Versed gtt  [] Ativan gtt   [x] No Sedation    PARALYZED:  [x] No    [] Yes    VASOPRESSORS:  [x] No    [] Yes  [] Levophed [] Dopamine [] Vasopressin  [] Dobutamine [] Phenylephrine [] Epinephrine      OBJECTIVE:     VITAL SIGNS:  BP (!) 119/57   Pulse 75   Temp 97.6 °F (36.4 °C) (Axillary)   Resp 28   Ht 5' 6\" (1.676 m)   Wt 149 lb 11.1 oz (67.9 kg)   SpO2 93%   BMI 24.91 kg/m²   Tmax over 24 hours:  Temp (24hrs), Av.5 °F (36.4 °C), Min:97 °F (36.1 °C), Max:97.7 °F (36.5 °C)      Patient Vitals for the past 8 hrs:   BP Pulse Resp   23 0600 (!) 119/57 75 28   23 0500 (!) 124/56 80 22   23 0415 133/62 87 25   23 0400 (!) 119/57 82 22   23 0345 124/61 85 16   23 0330 113/61 87 14   23 0300 127/63 90 27   23 0245 (!) 114/58 89 23   23 0230 (!) 124/57 89 29   23 0215 (!) 143/57 93 22   23 0200 112/61 94 15   23 0115 (!) 159/78 (!) 113 19   23 0100 (!) 149/68 (!) 111 26   23 0045 (!) 179/70 (!) 113 23   23 0030 (!) 173/74 (!) 116 16   23 0015 (!) 164/70 (!) 114 23   23 0000 (!) 153/120 (!) 112 21   23 2345 (!) 156/79 (!) 111 23   23 2330 (!) 159/76 (!) 102 19         Intake/Output Summary (Last 24 hours) at 2023 0723  Last data filed at 2023 0514  Gross per 24 hour   Intake 939.45 ml   Output 2550 ml   Net -1610.55 ml     Date 23 0000 - 23 2359   Shift 6369-6397 0358-0317 6948-4688 24 Hour Total   INTAKE   I.V.(mL/kg) 311.7(4.6)   311.7(4.6)   IV Piggyback(mL/kg) 160.1(2.4)   160.1(2.4)   Shift Total(mL/kg) 471.8(6.9)   471.8(6.9)   OUTPUT   Urine(mL/kg/hr) 650   650   Shift Total(mL/kg) 650(9.6)   650(9.6)   Weight (kg) 67.9 67.9 67.9 67.9     Wt Readings from Last 3 Encounters: 01/11/23 149 lb 11.1 oz (67.9 kg)   01/08/23 190 lb (86.2 kg)   08/23/22 180 lb (81.6 kg)     Body mass index is 24.91 kg/m². PHYSICAL EXAM:  GEN:                  Arousable, repetitive speech pattern, unable to answer questions.  On Bipap  EYES:                pupils equal, round, and reactive to light  HEENT:            Normocephalic, without obvious abnormality  LUNGS:            good air exchange  CV:                     regular rate and rhythm  ABDOMEN:     soft and non-distended  MSK:                  R AKA  NEURO[de-identified]           Arousable, repetitive speech pattern, residual RUE deficit, able to grasp with left hand and wiggles left toes and R knee  EXTREMITIES:  distal pulses intact, R AKA      MEDICATIONS:  Scheduled Meds:   levETIRAcetam  500 mg IntraVENous Q12H    furosemide  40 mg IntraVENous BID    lisinopril  5 mg Oral Daily    lacosamide (VIMPAT) IVPB  100 mg IntraVENous BID    insulin lispro  0-8 Units SubCUTAneous Q6H    famotidine (PEPCID) injection  20 mg IntraVENous BID    cefTRIAXone (ROCEPHIN) IV  1,000 mg IntraVENous Q24H    atorvastatin  40 mg Oral Nightly    aspirin  81 mg Oral Daily    therapeutic multivitamin-minerals  1 tablet Oral Daily with breakfast    sodium chloride flush  5-40 mL IntraVENous 2 times per day     Continuous Infusions:   nitroGLYCERIN 50 mcg/min (01/12/23 0634)    heparin (PORCINE) Infusion 24 Units/kg/hr (01/12/23 0514)    dexmedetomidine 0.4 mcg/kg/hr (01/12/23 0514)    dextrose      sodium chloride       PRN Meds:   bisacodyl, 5 mg, Daily PRN  glucose, 4 tablet, PRN  dextrose bolus, 125 mL, PRN   Or  dextrose bolus, 250 mL, PRN  glucagon (rDNA), 1 mg, PRN  dextrose, , Continuous PRN  heparin (porcine), 4,000 Units, PRN  heparin (porcine), 2,000 Units, PRN  dextrose bolus, 125 mL, PRN   Or  dextrose bolus, 250 mL, PRN  potassium chloride, 10 mEq, PRN  magnesium sulfate, 1,000 mg, PRN  sodium phosphate IVPB, 10 mmol, PRN   Or  sodium phosphate IVPB, 15 mmol, PRN Or  sodium phosphate IVPB, 20 mmol, PRN  sodium chloride flush, 5-40 mL, PRN  sodium chloride, , PRN  ondansetron, 4 mg, Q8H PRN   Or  ondansetron, 4 mg, Q6H PRN  polyethylene glycol, 17 g, Daily PRN  acetaminophen, 650 mg, Q6H PRN   Or  acetaminophen, 650 mg, Q6H PRN        SUPPORT DEVICES: [] Ventilator [x] BIPAP  [] Nasal Cannula [] Room Air    DATA:  Complete Blood Count:   Recent Labs     01/10/23  0430 01/11/23  0701 01/12/23  0346   WBC 14.6* 12.5* 15.1*   RBC 4.98 4.53 4.45   HGB 15.0 13.3 13.3   HCT 46.6 41.8 40.9   MCV 93.6 92.3 91.9   MCH 30.1 29.4 29.9   MCHC 32.2 31.8 32.5   RDW 13.1 13.3 13.1    199 221   MPV 12.0 11.4 11.1        Last 3 Blood Glucose:   Recent Labs     01/09/23  0924 01/10/23  0430 01/11/23  0701 01/12/23  0346   GLUCOSE 75 155* 155* 183*        PT/INR:    Lab Results   Component Value Date/Time    PROTIME 13.7 08/23/2022 11:39 PM    INR 1.1 08/23/2022 11:39 PM     PTT:    Lab Results   Component Value Date/Time    APTT 45.2 01/12/2023 03:46 AM       Comprehensive Metabolic Profile:   Recent Labs     01/10/23  0430 01/11/23  0701 01/12/23  0346    138 137   K 3.8 3.7 3.0*    104 100   CO2 24 22 21   BUN 11 13 12   CREATININE 0.69* 0.78 0.86   GLUCOSE 155* 155* 183*   CALCIUM 8.5* 8.1* 8.2*      Magnesium:   Lab Results   Component Value Date/Time    MG 1.5 01/12/2023 03:46 AM    MG 1.7 01/11/2023 07:01 AM    MG 2.0 01/10/2023 04:30 AM     Phosphorus:   Lab Results   Component Value Date/Time    PHOS 2.3 01/12/2023 03:46 AM    PHOS 3.3 01/11/2023 07:01 AM    PHOS 2.3 01/10/2023 04:30 AM     Ionized Calcium:   Lab Results   Component Value Date/Time    CAION 1.13 07/04/2019 04:40 AM    CAION 0.97 04/09/2019 12:21 AM    CAION 1.01 04/07/2019 07:47 PM        Urinalysis:   Lab Results   Component Value Date/Time    NITRU NEGATIVE 01/07/2023 05:08 PM    COLORU Yellow 01/07/2023 05:08 PM    PHUR 5.5 01/07/2023 05:08 PM    WBCUA 0 TO 2 01/07/2023 05:08 PM    RBCUA 0 TO 2 01/07/2023 05:08 PM    MUCUS NOT REPORTED 04/14/2019 01:01 AM    TRICHOMONAS NOT REPORTED 04/14/2019 01:01 AM    YEAST NOT REPORTED 04/14/2019 01:01 AM    BACTERIA NOT REPORTED 04/14/2019 01:01 AM    SPECGRAV 1.036 01/07/2023 05:08 PM    LEUKOCYTESUR NEGATIVE 01/07/2023 05:08 PM    UROBILINOGEN Normal 01/07/2023 05:08 PM    BILIRUBINUR NEGATIVE 01/07/2023 05:08 PM    GLUCOSEU 3+ 01/07/2023 05:08 PM    KETUA SMALL 01/07/2023 05:08 PM    AMORPHOUS NOT REPORTED 04/14/2019 01:01 AM       HgBA1c:    Lab Results   Component Value Date/Time    LABA1C 12.7 01/09/2023 04:32 PM     TSH:    Lab Results   Component Value Date/Time    TSH 0.45 01/07/2023 11:24 PM     Lactic Acid: No results found for: LACTA   Troponin: No results for input(s): TROPONINI in the last 72 hours.       ASSESSMENT:     Patient Active Problem List    Diagnosis Date Noted    Stenosis of right middle cerebral artery 01/09/2023    At risk of seizures 01/09/2023    Cardiomyopathy, ischemic 01/09/2023    Acute ischemic right MCA stroke ([address] 01/09/2023    Altered mental status 01/07/2023    DKA, type 2, not at goal [facility]) 01/07/2023    Pressure injury of right heel, unstageable ([address] 12/10/2020    Hx of AKA (above knee amputation), right ([address] 12/10/2020    Erectile dysfunction associated with type 2 diabetes mellitus ([address] 12/10/2020    Above knee amputation of right lower extremity ([address] 09/28/2019    Coronary artery disease 08/23/2019    Ischemic gangrene ([address]     Pyogenic inflammation of bone ([address]     Status post angiography of extremity 08/20/2019    Leukocytosis     Right-sided extracranial carotid artery stenosis 07/03/2019    Essential hypertension     Type 2 diabetes mellitus with diabetic neuropathy, without long-term current use of insulin (HCC)     Cellulitis of right lower extremity     Moderate malnutrition ([address] 04/26/2019    Acute CVA (cerebrovascular accident) ([address] 04/25/2019    Thrombocytosis     Right foot drop     Carotid stenosis, bilateral     Cerebral multi-infarct state     Multi infarct state     Hepatitis     Acute metabolic encephalopathy     Abnormal CT of the head     PAD (peripheral artery disease) (HCC) 04/07/2019    Subacute osteomyelitis of right tibia [facility])           PLAN:     Neuro  Arousable, repetitive speech pattern, not following commands  Precedex gtt 0.7  Neurology following  CTA head/neck 1/6 showing severe stenosis in L subclavian artery, R TOAN, MCA, ICA and vertebral arteries  MRI brain 1/8 - punctate R temporal periventricular stroke  On ASA and Lipitor  Neuroendovascular - continue ASA for punctate ischemic stroke - stenoses are chronic and unchanged since 2019, no intervention at this time  LTME 1/8 - moderate encephalopathy, left anterior temporal sharps suggesting underlying lesion or dysfunction, increased risk for focal seizures   Modified barium swallow 1/9 for mildly thickened liquids  Thiamine and folic acid given in ED     Cardiology  Hx CABG, R ICA stent, vertebral artery stenosis, TOAN MCA and ICA stenosis  EKG in ED showing ST elevation in V1-V3  Cardiology following, plan for cardiac cath pending improvement in neurologic status  Heparin gtt  Nitro gtt 50  Troponin 92-  BNP 1634  Lopressor 2.5 q 6h  Started 5 mg lisinopril yesterday  -250 in ED - initially on cardene gtt, now off cardene gtt     Pulmonary  BiPap 60% FiO2  ABG pending this AM  ABG 1/10 7.220/55/38/21  CXR 1/10 overnight showing concern for flash pulm edema  CXR 1/11 mild cardiomegaly with diffuse pulmonary infiltrates  Repeat CXR this morning pending     Renal  Cr 0.86   U out 2550  Lasix 40 BID  K 3.0, replaced  NS @ 50 ml/hr     GI  Modified barium swallow 1/9 passed for mildly thickened liquids  Diet   Pepcid 20 BID     ID  WBC  15.1  Ceftriaxone, ending 1/15  Blood cultures no growth x 1 day     Endo  Glucose 183  HDISS q 4 h  NS @ 50 ml/hr    Karen Hudson M.D.   Emergency Medicine Resident, PGY-2  1/12/2023 7:23 AM      Attending Physician Statement  I have discussed the care of Evans Pool, including pertinent history and exam findings with the resident. I have reviewed the key elements of all parts of the encounter with the resident. I have seen and examined the patient with the resident. I agree with the assessment and plan and status of the problem list as documented. I seen the patient during around today, chart reviewed, labs and medications reviewed overnight events noted. He remained confused and disoriented no change in neurological status and no improvement  Patient had been on BiPAP overnight he was also on nitroglycerin drip. He was started on lisinopril 5 mg and today he was seen by cardiology and just placed on Coreg. Patient does have tachycardia this morning likely secondary to nitro drip. He is started on Lasix 40 mg twice daily and his respiratory status is better chest x-ray is also shows less pulmonary venous congestion and pulmonary edema. Urine output is 2550 last 24 hours. ABG this morning was 7.4 5/36/64/25 and he is off BiPAP currently on 4 L nasal cannula. Will continue with Lasix for today and may need adjustment by decreasing the dose of Lasix monitor intake and output replace potassium and follow potassium again later today as potassium was 3.0. Continue with Coreg and will adjust lisinopril as he will need increased dose of lisinopril and wean off nitro drip discussed with nursing staff. We will keep him off BiPAP during the daytime and will use BiPAP at night and as needed during the daytime  Continue to follow with neurology. Continue with antiepileptic drug with Vimpat and Keppra. He did require Precedex drip for short time last night currently is off Precedex drip. Continue on heparin drip. Discussed with brother and updated  Discussed with nursing staff, treatment and plan discussed.   Discussed with respiratory therapist.    Total critical care time caring for this patient with life threatening, unstable organ failure, including direct patient contact, management of life support systems, review of data including imaging and labs, discussions with other team members and physicians at least 35 min so far today, excluding procedures. Please note that this chart was generated using voice recognition Dragon dictation software. Although every effort was made to ensure the accuracy of this automated transcription, some errors in transcription may have occurred.      Kia Tobin MD  1/12/2023 12:06 PM

## 2023-01-12 NOTE — PROGRESS NOTES
SPIRITUAL CARE DEPARTMENT - [facility] [address]  PROGRESS NOTE    Shift date: 1.11.2023  Shift day: Wednesday   Shift # 2    Room # 0216/9047-34   Name: Kalin Aragon                Hindu: unknown   Place of Alevism: unknown    Referral: Routine Visit    Admit Date & Time: 1/7/2023  4:22 PM    Assessment:  Kalin Aragon is a 61 y.o. male in the hospital. Upon entering the room writer observes patient talking but repeated the same words without complete sentences. Son was bedside and expressed concerns over the patient's condition and how there are no answers as to what is wrong with the patient. Son is attempting to maintain a normal schedule and provide self care while supporting his father. Intervention:  Writer introduced self and title as  Writer offered space for the son  to express feelings, needs, and concerns and provided a ministry presence. Outcome: The son appears concerned and slightly overwhelmed with relation to his father's condition. Plan:  Chaplains will remain available to offer spiritual and emotional support as needed. Electronically signed by Elder Stuart on 1/11/2023 at 8:51 PM.  [facility]  [phone]       01/11/23 1930   Encounter Summary   Service Provided For: Patient and family together   Referral/Consult From: [address] [place] [address]   Last Encounter  01/11/23   Complexity of Encounter Moderate   Begin Time 1930   End Time  2000   Total Time Calculated 30 min   Encounter    Type Initial Screen/Assessment   Assessment/Intervention/Outcome   Assessment Anxious; Concerns with suffering;Stress overload   Intervention Active listening;Explored/Affirmed feelings, thoughts, concerns; Discussed illness injury and its impact;Sustaining Presence/Ministry of presence   Outcome Engaged in conversation;Expressed feelings, needs, and concerns     Electronically signed by Dany Booth on 1/11/2023 at 8:51 PM

## 2023-01-12 NOTE — PROGRESS NOTES
Occupational Therapy  Facility/Department[facility]  Occupational Therapy Initial Assessment    Name: Arabella Smith  : 1965  MRN: [id number]  Date of Service: 2023       CHIEF COMPLAINT            Chief Complaint   Patient presents with    Altered Mental Status         HISTORY OF PRESENT ILLNESS  (Location/Symptom, Timing/Onset, Context/Setting, Quality, Duration, Modifying Factors, Severity.)       Arabella Smith is a 61 y.o. male who presents with altered mental status. Last known well was 2 days ago on Thursday. Patient lives alone, is able to complete all his ADLs on his own, was otherwise in fairly good health. Patient was found on the ground, somewhat unresponsive and unable to speak. He has been awake for EMS but unable to provide any history. Speech is garbled. They noted blood glucose of 250. Somewhat tachycardic heart rate and hypertension. On arrival, patient is awake and alert but cannot speak or provide any history. He is moving all extremities. Does not appear to be in pain. With exception of speech changes no other acute deficits. Review of EMR demonstrates multiple comorbidities including CAD, with history of CABG, DM, hypertension, hyperlipidemia, PVD    Discharge Recommendations:  Patient would benefit from continued therapy after discharge  OT Equipment Recommendations  Equipment Needed:  (CTA)       Patient Diagnosis(es): The primary encounter diagnosis was Altered mental status, unspecified altered mental status type. Diagnoses of Hyperglycemia and Stenosis of right middle cerebral artery were also pertinent to this visit. Past Medical History:  has a past medical history of CAD (coronary artery disease), Cardiac arrest ([address] Diabetes mellitus ([address] Hyperlipidemia, Hypertension, Osteolysis, PEA (Pulseless electrical activity) ([address] PVD (peripheral vascular disease) ([address] and Wound, open.   Past Surgical History:  has a past surgical history that includes Femur fracture surgery (Right, 2000); Cardiac catheterization (04/07/2019); Femur Surgery; Tonsillectomy (1974); Coronary artery bypass graft (04/07/2019); Hardware Removal (Right, 04/07/2019); Tibia fracture surgery (Right, 2000); Coronary artery bypass graft (N/A, 4/7/2019); RECONSTRUCTIVE REPAIR STERNAL (N/A, 4/8/2019); Hardware Removal (Right, 04/22/2019); REMOVE HARDWARE FEMUR (N/A, 4/22/2019); Carotid stent placement (07/03/2019); Leg amputation below knee (Right, 8/25/2019); Leg amputation below knee (Right, 8/27/2019); above knee amputation (Right, 09/28/2019); and AMPUTATION ABOVE KNEE (Right, 9/28/2019). Assessment   Performance deficits / Impairments: Decreased functional mobility ; Decreased ADL status; Decreased ROM; Decreased strength;Decreased safe awareness;Decreased cognition;Decreased endurance;Decreased fine motor control;Decreased high-level IADLs;Decreased balance;Decreased coordination;Decreased posture  Assessment: Pt minimally following commands this date and oriented to self only. Pt perseverates throughout session repeating \"[place]\" on several occasions throughout evaluation. Pt not appropriate for OOB activity this date d/t decreased cognition as well as elevated BP readings of 191/72 at start of session and 161/71 shortly after initation of evaluation. Pt has R AKA and it is unclear at this time if pt was able to self transfer prior to admission d/t decreased cognitive status.  Pt would benefit from continued therapy to increase IND/safety in ADL's prior to returning home  Prognosis: Fair  Decision Making: High Complexity  REQUIRES OT FOLLOW-UP: Yes  Activity Tolerance  Activity Tolerance: Treatment limited secondary to decreased cognition        Plan   Occupational Therapy Plan  Times Per Week: 2-4x/week  Current Treatment Recommendations: Balance training, Endurance training, Safety education & training, Cognitive reorientation, Equipment evaluation, education, & procurement, Patient/Caregiver education & training, Cognitive/Perceptual training, Self-Care / ADL, Coordination training, Wheelchair mobility training     Restrictions  Restrictions/Precautions  Restrictions/Precautions: Up as Tolerated  Required Braces or Orthoses?: No  Position Activity Restriction  Other position/activity restrictions: up with assist; Hx R AKA    Subjective   General  Patient assessed for rehabilitation services?: Yes  Family / Caregiver Present: No  General Comment  Comments: RN okayed for therapy. Pt agreeable to OT/PT eval however confused throughout. Pt denies pain throughout session     Social/Functional History  Social/Functional History  Additional Comments: OTONIEL at current time d/t pt cognitive status. Pt unable to respond to questions appropriately at this time       Objective     Safety Devices  Type of Devices: Call light within reach; Patient at risk for falls; Left in bed;Nurse notified;Sitter present  Restraints  Restraints Initially in Place: No    Bed Mobility Training  Bed Mobility Training: Yes  Overall Level of Assistance: Maximum assistance;Assist X2  Rolling: Maximum assistance;Assist X2  Supine to Sit: Maximum assistance;Assist X2  Sit to Supine: Maximum assistance;Assist X2  Scooting: Maximum assistance;Assist X2    Balance  Sitting: Without support (fluctuating between Min-Max A to maintain sitting balance EOB)  Standing:  (OTONIEL this date; not appropriate for OOB activity at this time)    Transfer Training  Transfer Training: No (OTONIEL this date; not appropriate for OOB activity at this time)     AROM: Generally decreased, functional (difficult to formally assess this date. Pt observed raising RUE to ~90 degrees to reach in front of him.  Unable to follow comamnds to formally assess further)  PROM: Within functional limits  Strength: Generally decreased, functional (difficult to formally assess throughout BUE; able to squeeze hands on commands at 4/5 B hands)  Coordination: Grossly decreased, non-functional (able to move BUE throughout session however unable to follow commands for formally assess coordination.)  Tone: Normal  Sensation:  (OTONIEL)    ADL  Feeding: NPO  UE Bathing: Maximum assistance  LE Bathing: Maximum assistance  UE Dressing: Maximum assistance  LE Dressing: Maximum assistance  Toileting: Maximum assistance  Additional Comments: Pt minimally following commands at this time. Pt is awake and able to squeeze B hands on command at times. Difficult to maintain attention to task despite redirection and Max cues    Vision  Vision:  (OTONIEL)  Hearing  Hearing:  (OTONIEL)    Cognition  Overall Cognitive Status: Exceptions  Following Commands: Inconsistently follows commands  Attention Span: Unable to maintain attention  Memory: Decreased recall of recent events;Decreased recall of biographical Information  Safety Judgement: Decreased awareness of need for safety;Decreased awareness of need for assistance  Problem Solving: Decreased awareness of errors  Insights: Not aware of deficits  Initiation: Requires cues for all  Sequencing: Requires cues for all  Cognition Comment: Pt awake however minimally following commands throughout session  Orientation  Overall Orientation Status: Impaired  Orientation Level: Oriented to person;Disoriented to place; Disoriented to time;Disoriented to situation                  Education Given To: Patient  Education Provided: Role of Therapy;Plan of Care;Transfer Training  Education Method: Verbal;Demonstration  Barriers to Learning: Cognition  Education Outcome: Continued education needed; Unable to demonstrate understanding    AM-PAC Score        AM-PAC Inpatient Daily Activity Raw Score: 12 (01/12/23 1455)  AM-PAC Inpatient ADL T-Scale Score : 30.6 (01/12/23 1455)  ADL Inpatient CMS 0-100% Score: 66.57 (01/12/23 1455)  ADL Inpatient CMS G-Code Modifier : CL (01/12/23 1455)    Goals  Short Term Goals  Time Frame for Short Term Goals: By discharge  Short Term Goal 1: Pt will follow 50% commands throughout session  Short Term Goal 2: Pt will engage in UB ADL's with Min A  Short Term Goal 3: Pt will engage in LB ADL's with Mod A with AE/DME/modified techniques  Short Term Goal 4: Pt will maintain sitting balance EOB CGA 10+ mins for engagement in functional task  Short Term Goal 5: NOTIFY OTR TO UPDATE GOALS AS PT PROGRESSESS       Therapy Time   Individual Concurrent Group Co-treatment   Time In 7726         Time Out 1413         Minutes 16      Co-eval w/PT   Timed Code Treatment Minutes: 8 Minutes       Renetta Dailey, OTR/L

## 2023-01-12 NOTE — PROGRESS NOTES
Diabetes Education     Follow up visit: spoke with patient's nurse -  Plan is for SNF due to patient's current inability for self-care. Spoke with patient. Son not present but sitter is. She reports that patient took a few bites of his lunchtime meal but denied desire for more. Ambrose Patient was awake, alert and pleasant. He engaged easily in conversation but struggled to answer questions appropriately. He repeated phrases often unrelated to the question that was asked. After some repetition on my part, he was able to express to me that he has had diabetes for about 10 years but I was not able to having a meaning diabetes eduction session with him. I will sign off his case for now. Please re consult if eduction needs arise. Beth Runner, RN      Below is the last diabetes education note from 1-7-23 pulled forward for reference  Type and Reason for Visit: Patient Education -    uncontrolled diabetes / hyperglycemia  Writer rounded patient in room, not able to be assessed for diabetes self care needs at this time due to patient with poor mentation at this time - per bedside RN patient only intermittently oriented to self. Per chart review patient has history of diabetes type 2 , per A1C trend poor control. Lab Results   Component Value Date    LABA1C 12.7 (H) 01/09/2023    LABA1C 14.0 12/23/2021    LABA1C 12.9 11/09/2021     Home medication list includes Insulins - lantus and humalog and oral metformin. Last PCP visit with [facility] was 1 /13/ 2022 and not refils for home meds or insulin noted. No future PCP appt seen on appt desk. [facility] Diabetes Education team will follow up with patient education and support as patient's condition allows. Out patient diabetes education  contact number provided - [phone] to patient and placed on the discharge summary.

## 2023-01-12 NOTE — PROGRESS NOTES
Neurology Nurse Practitioner Progress Note      INTERVAL HISTORY: This is a 61 y.o.  male admitted 1/7/2023 for altered mentation. This is a follow-up neurology progress note. The patient was examined and the chart was reviewed. Discussed with the RN. There were no acute events overnight. Pt was alert, quick to tell his name and date of birth, other than that he was unable to provide any history or orientation answers, intermittent tangential speech with perseveration, with anomia; he was not able to follow simple commands. HPI: Monet Navarro is a 61 y.o. male with H/O PEA arrest x 2, s/p CABG x 4 (4/2019), ischemic cardiomyopathy, multi-infarct state, R toe osteomyelitis s/p R AKA (2009), HTN, HLD, DM, PAD, prior head trauma, osteolysis, osteomyelitis, who was admitted 1/7/2023 for altered mentation. Last known well was 2 days ago. Patient lives alone, apparently was able to complete his ADLs. Reportedly he was found on the ground, somewhat unresponsive and unable to speak properly as his speech was garbled. EMS were called who found him disheveled, awake but unable to provide any history; moving all limbs. His speech was garbled. His blood glucose was 250, was hypertensive 200's/100's and tachycardic 110's. Patient was brought to [facility] ED for evaluation. Neurology was consulted for encephalopathy. Patient is known to our service from his previous admission. 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 Pt s/p R ICA stenting (4/2019).      carvedilol  12.5 mg Oral BID WC    carvedilol  6.25 mg Oral Once    lisinopril  20 mg Oral Daily    levETIRAcetam  500 mg IntraVENous Q12H    furosemide  40 mg IntraVENous BID    lacosamide (VIMPAT) IVPB  100 mg IntraVENous BID    insulin lispro  0-8 Units SubCUTAneous Q6H    famotidine (PEPCID) injection  20 mg IntraVENous BID    cefTRIAXone (ROCEPHIN) IV  1,000 mg IntraVENous Q24H    atorvastatin  40 mg Oral Nightly    aspirin 81 mg Oral Daily    therapeutic multivitamin-minerals  1 tablet Oral Daily with breakfast    sodium chloride flush  5-40 mL IntraVENous 2 times per day       Past Medical History:   Diagnosis Date    CAD (coronary artery disease) 04/07/2019    triple bypass    Cardiac arrest ([facility] 04/07/2019    Diabetes mellitus ([facility] 04/2019    ON RX    Hyperlipidemia 04/2019    ON RX HAS NOT HAD SINCE 06/30/2019    Hypertension 04/2019    ON RX NONE SINCE 06/2019    Osteolysis 04/07/2019    R Knee hardware    PEA (Pulseless electrical activity) ([facility] 04/07/2019    PVD (peripheral vascular disease) ([facility] 04/07/2019    Wound, open 09/2019    RIGHT STUMP DRAINING MOD AMT BLOODY DRAINAGE       Past Surgical History:   Procedure Laterality Date    ABOVE KNEE AMPUTATION Right 09/28/2019    AMPUTATION ABOVE KNEE Right 9/28/2019    RIGHT ABOVE KNEE AMPUTATION performed by Sen Carter MD at [address] [place] [address]  04/07/2019    MVD-Stv    CAROTID STENT PLACEMENT  07/03/2019    ACCULINK CAROTID STENT PLACED, MRI CONDITIONAL 5, 3T OK.      CORONARY ARTERY BYPASS GRAFT  04/07/2019    x4 per Dr. Kacey Roldan, [address] GRAFT N/A 4/7/2019    EMERGENT CPR,  CORONARY ARTERY BYPASS X4, ON PUMP ,CHEST LEFT OPEN; SWAN, ANGELA PER ANESTHESIA performed by Luiz Medina MD at [facility]      unsure if correct known tibial surgery    HARDWARE REMOVAL Right 04/07/2019    R Knee internal screw protruding through skin    HARDWARE REMOVAL Right 04/22/2019    HARDWARE REMOVAL RIGHT TIBIA,    LEG AMPUTATION BELOW KNEE Right 8/25/2019    RIGHT FOOT GUILLOTINE AMPUTATION performed by Sen Carter MD at [place] [address] Right 8/27/2019    RIGHT BELOW KNEE AMPUTATION FORMALIZATION performed by Sen Carter MD at [address] [place] [address] 122 4/8/2019    STERNAL  WOUND WASHOUT AND CLOSURE S/P CABG performed by Marti Dickinson MD at [address] [place] [address] N/A 4/22/2019    HARDWARE REMOVAL RIGHT TIBIA, IRRIGATION AND DEBRIDEMENT [address] [facility] performed by José Kumar MD at [facility] [place] Right 2000    [address] off roof, external fixation and internal hardware    TONSILLECTOMY  1974       PHYSICAL EXAM:    Blood pressure (!) 119/57, pulse 75, temperature 97.6 °F (36.4 °C), temperature source Axillary, resp. rate 28, height 5' 6\" (1.676 m), weight 149 lb 11.1 oz (67.9 kg), SpO2 93 %.       ROS: Unable to perform due to patient's aphasia        Limited Neurological Examination:  Pt was alert, quick to tell his name and date of birth, other than that he was unable to provide any history or orientation answers   Had intermittent tangential speech with perseveration   Anomia & unable to repeat sentences  Unable to mimic  He was not able to follow simple commands   R AKA; was able to move rest of the limbs  DTRs - intact  L plantar - flexor response          DATA    Lab Results   Component Value Date    WBC 15.1 (H) 01/12/2023    RBC 4.45 01/12/2023    HGB 13.3 01/12/2023    HCT 40.9 01/12/2023     01/12/2023    ALT 18 01/07/2023    AST 16 01/07/2023     01/12/2023    K 3.0 (L) 01/12/2023    MG 1.5 (L) 01/12/2023    PHOS 2.3 (L) 01/12/2023     01/12/2023    AMMONIA 39 01/12/2023    CREATININE 0.86 01/12/2023    BUN 12 01/12/2023    CO2 21 01/12/2023    TSH 0.45 01/07/2023    INR 1.1 08/23/2022    TIJROMUW05 1994 (H) 04/17/2019    FOLATE 17.6 04/17/2019    LABA1C 12.7 (H) 01/09/2023    LABMICR 8 12/15/2020     Lab Results   Component Value Date    CHOL 114 01/09/2023    CHOL 242 (H) 01/13/2022    CHOL 109 12/15/2020     Lab Results   Component Value Date    TRIG 135 01/09/2023    TRIG 637 (H) 01/13/2022    TRIG 210 (H) 12/15/2020     Lab Results   Component Value Date    HDL 26 (L) 01/09/2023    HDL 24 (L) 01/13/2022    HDL 25 (L) 12/15/2020 Lab Results   Component Value Date    LDLCHOLESTEROL 61 01/09/2023    LDLCHOLESTEROL      01/13/2022    LDLCHOLESTEROL 42 12/15/2020     Lab Results   Component Value Date    VLDL NOT REPORTED 01/13/2022    VLDL NOT REPORTED (H) 12/15/2020    VLDL NOT REPORTED 04/17/2019     Lab Results   Component Value Date    CHOLHDLRATIO 4.4 01/09/2023    CHOLHDLRATIO 10.1 (H) 01/13/2022    CHOLHDLRATIO 4.4 12/15/2020         DIAGNOSTIC DATA:  CT HEAD (1/7/2023): No acute intracranial abnormality        MRI BRAIN (1/8/2023): Tiny acute/subacute infarct in posteromedial R temporal periventricular white matter. Areas of encephalomalacia in b/l frontal & L parietal lobes; old trauma vs old infarcts. Old lacunar infarcts in R cerebellar hemisphere & R thalamus. REPEAT MRI BRAIN:    CTA HEAD & NECK (1/7/2023):   1.  L ICA & L VA: chronic occlusion. 2.  R ICA, cervical: patent stent with mild-to-moderate stent stenosis. 3.  R MCA branches: multifocal areas of moderate-to-severe stenosis. 4.  R A2 & A3: multifocal severe stenoses. 5.  L subclavian artery: focal severe stenosis, 2/2 noncalcified atherosclerotic    plaque at the level of the L VA. ECHO (1/9/2023): EF 35-40%. Global hypokinesis, worse in the apex and all apical segments. Definity used to improve delineation of LV endocardial border. LV apical thrombus, 1.4 x 0.6 cm, is noted. Evidence of diastolic dysfunction. Trivial MR      EEG (1/9/2023): Abnormal continuous vEEG recording, the slowing suggests mild-moderate non specific encephalopathy. The presence of L anterior temporal lobe sharps and asymmetry suggests underlying lesion/dysfunction and increases patient risk for focal seizures      LTME (1/9 - 1/11/2023): Day 1 & 2: During this day of recording no events were recorded. The interictal EEG was abnormal due to diffuse polymorphic theta slowing suggesting moderate encephalopathy.  Near continuous L anterior temporal sharp waves conferred an increased risk for focal onset seizures    Day 3: During this day of recording no events were recorded. The interictal EEG was abnormal due to diffuse polymorphic theta slowing suggesting moderate encephalopathy. Previously seen left anterior temporal sharp waves appeared less frequent. These discharges conferred an increased risk for focal onset seizures                  IMPRESSION: 61 y.o.  male admitted with  Metabolic encephalopathy in the setting of DKA & HTN emergency; overnight flash pulmonary edema, requiring Lasix, nitro drip and BiPAP. MRI brain - tiny acute/subacute infarct in R temporal lobe. This infarct is not increasing/causing his encephalopathy. LTME - near continuous L anterior temporal sharp waves, with no electrographic seizures. Pt was alert, quick to tell his name and date of birth, other than that he was unable to provide any history or orientation answers, intermittent tangential speech with perseveration, with anomia; he was not able to follow simple commands     Prior Hx multi-infarct state    CTA head/neck - multilevel stenosis. Hx chronic L ICA & L VA occlusion & s/p R ICA stenting (4/2019). No intervention at this time, as per neuro endovascular team    Acute on chronic ischemic cardiomyopathy with drop of EF; LV thrombus; is on heparin drip    Comorbid conditions - PEA arrest x 2, s/p CABG x 4 (4/2019), R toe osteomyelitis s/p R AKA (2009), HTN, HLD, DM, PAD, prior head trauma, osteolysis              PLAN:  Repeat MRI brain - ordered    Continue ASA 81 mg QD, Lipitor 40 mg HS    Continue Keppra 500 mg IVPB Q12 Hr & Vimpat 100 mg IVPB Q12 Hr    Case was discussed with Dr. López Mcdowell; patient has multiple risk factors for further strokes, however he is neurologically clear for cardiac cath    Will follow    Please note that this note was generated using a voice recognition dictation software.  Although every effort was made to ensure the accuracy of this automated transcription, some errors in transcription may have occurred.

## 2023-01-12 NOTE — PROGRESS NOTES
Physical Therapy  Facility/Department: [facility] CAR 3- [facility]  Physical Therapy Initial Assessment    Name: Marce Proctor  : 1965  MRN: [id number]  Date of Service: 2023    Discharge Recommendations:  Patient would benefit from continued therapy after discharge, Therapy recommended at discharge          Patient Diagnosis(es): The primary encounter diagnosis was Altered mental status, unspecified altered mental status type. Diagnoses of Hyperglycemia and Stenosis of right middle cerebral artery were also pertinent to this visit. Past Medical History:  has a past medical history of CAD (coronary artery disease), Cardiac arrest ([facility] [address] Diabetes mellitus ([facility] [address] Hyperlipidemia, Hypertension, Osteolysis, PEA (Pulseless electrical activity) ([facility] [address] PVD (peripheral vascular disease) ([facility] [address] and Wound, open. Past Surgical History:  has a past surgical history that includes Femur fracture surgery (Right, ); Cardiac catheterization (2019); Femur Surgery; Tonsillectomy (); Coronary artery bypass graft (2019); Hardware Removal (Right, 2019); Tibia fracture surgery (Right, ); Coronary artery bypass graft (N/A, 2019); RECONSTRUCTIVE REPAIR STERNAL (N/A, 2019); Hardware Removal (Right, 2019); REMOVE HARDWARE FEMUR (N/A, 2019); Carotid stent placement (2019); Leg amputation below knee (Right, 2019); Leg amputation below knee (Right, 2019); above knee amputation (Right, 2019); and AMPUTATION ABOVE KNEE (Right, 2019). Assessment   Assessment: Pt admitted with AMS, MRI brain showed tiny acute to subacute infarction of the right temporal region, malacia of bilateral pontine parietal lobes. Pt with history of R AKA. Pt confused, unable to follow commands. Required max a x 2 to dangle at EOB, requiredmin to max A for seated balance, pt attention deficits, and unable to follow commands most of the time.   Pt perseverates throughout session repeating \"[place]\" on several occasions throughout evaluation. Pt not appropriate for OOB activity this date d/t decreased cognition as well as elevated BP readings of 191/72 at start of session and 161/71 shortly after initation of evaluation. Pt will benefit from continued therapy while in acute care as well as at discharge. Treatment Diagnosis: Impaired mobility  Therapy Prognosis: Fair  Decision Making: High Complexity  History: Hx of AKA  Barriers to Learning: Cognitive  Requires PT Follow-Up: Yes  Activity Tolerance  Activity Tolerance: Patient limited by fatigue;Patient limited by endurance;Treatment limited secondary to medical complications;Treatment limited secondary to decreased cognition     Plan   Physcial Therapy Plan  General Plan: 3-5 times per week  Current Treatment Recommendations: Strengthening, Balance training, Functional mobility training, Transfer training, Endurance training, Neuromuscular re-education, Cognitive reorientation, Safety education & training, Patient/Caregiver education & training, Therapeutic activities  Safety Devices  Type of Devices: Call light within reach, Patient at risk for falls, Left in bed, Nurse notified, Sitter present  Restraints  Restraints Initially in Place: No     Restrictions  Restrictions/Precautions  Restrictions/Precautions: Up as Tolerated  Required Braces or Orthoses?: No  Position Activity Restriction  Other position/activity restrictions: up with assist; Hx R AKA     Subjective   General  Patient assessed for rehabilitation services?: Yes  Referral Date : 01/09/23  Diagnosis: AMS  Follows Commands: Impaired         Social/Functional History  Social/Functional History  Additional Comments: OTONIEL at current time d/t pt cognitive status.  Pt unable to respond to questions appropriately at this time  Vision/Hearing  Vision  Vision:  (OTONIEL)  Hearing  Hearing:  (OTONIEL)    Cognition   Orientation  Overall Orientation Status: Impaired  Orientation Level: Oriented to person;Disoriented to place; Disoriented to time;Disoriented to situation  Cognition  Overall Cognitive Status: Exceptions  Following Commands: Inconsistently follows commands  Attention Span: Unable to maintain attention  Memory: Decreased recall of recent events;Decreased recall of biographical Information  Safety Judgement: Decreased awareness of need for safety;Decreased awareness of need for assistance  Problem Solving: Decreased awareness of errors  Insights: Not aware of deficits  Initiation: Requires cues for all  Sequencing: Requires cues for all  Cognition Comment: Pt awake however minimally following commands throughout session     Objective   Heart Rate: (!) 108  Heart Rate Source: Monitor  BP: (!) 183/82  BP Location: Right upper arm  BP Method: Automatic  Patient Position: Semi fowlers  MAP (Calculated): 116  Resp: 15  SpO2: 96 %  O2 Device: Nasal cannula              AROM RLE (degrees)  RLE General AROM: R AKA- Hip ROM WFL  AROM LLE (degrees)  LLE AROM : WFL  Strength RLE  Comment: Pt not following commands, pt able to move Extremity on his own  Strength LLE  Comment: Pt not following commands, pt able to move Extremity on his own     Sensation  Overall Sensation Status:  (OTONIEL)  Bed Mobility Training  Bed Mobility Training: Yes  Overall Level of Assistance: Maximum assistance;Assist X2  Rolling: Maximum assistance;Assist X2  Supine to Sit: Maximum assistance;Assist X2  Sit to Supine: Maximum assistance;Assist X2  Scooting: Maximum assistance;Assist X2  Balance  Sitting: Without support (fluctuating between Min-Max A to maintain sitting balance EOB)  Standing:  (OTONIEL this date; not appropriate for OOB activity at this time)  Transfer Training  Transfer Training: No (OTONIEL this date; not appropriate for OOB activity at this time)  Bed mobility  Supine to Sit: 2 Person assistance;Maximum assistance  Sit to Supine: 2 Person assistance;Maximum assistance  Scooting: Dependent/Total;2 Person assistance  Bed Mobility Comments: Sitting balance fluctuates from min to max A, pt not following commands, has decreased attention span. Dangles at EOB for 4 to 5 minutes. Pt not safe to assist for sit<>stand. /71 prior to initiating mobility. Off loaded R cooccyx at end of session. Balance  Posture: Fair  Sitting - Static: -;Fair  Sitting - Dynamic: Poor           OutComes Score                                                  AM-PAC Score  AM-PAC Inpatient Mobility Raw Score : 6 (01/12/23 1523)  AM-PAC Inpatient T-Scale Score : 23.55 (01/12/23 1523)  Mobility Inpatient CMS 0-100% Score: 100 (01/12/23 1523)  Mobility Inpatient CMS G-Code Modifier : CN (01/12/23 1523)          Tinneti Score       Goals  Short Term Goals  Time Frame for Short Term Goals: 12 visits  Short Term Goal 1: Pt able to roll side to side in bed at min/mod A for positon change  Short Term Goal 2: Pt able to perform supine<>sit at mod A  Short Term Goal 3: Pt to tolerate dangling at EOB for 15 to 20 minutes to improve core stregnth/seated balance to progress to transfers. Short Term Goal 4: Pt able to improve sitting balance at EOB to fair- to improve independence  Short Term Goal 5: Attempt sit <>stand with benito inman with 2 person assist , and tolerate standing for 1 to 2 minutes. .  Short Term Goal 6: Transfers to [facility] with transfer lift system to tolerate sitting in chair for meals. Patient Goals   Patient Goals : Not able to state. Education  Patient Education  Education Given To: Patient  Education Provided: Role of Therapy;Plan of Care;Precautions; Fall Prevention Strategies  Barriers to Learning: Cognition  Education Outcome: Unable to demonstrate understanding      Therapy Time   Individual Concurrent Group Co-treatment   Time In 5796         Time Out 1413 (co-eval with OTR)         Minutes 16                 David Gray, PT

## 2023-01-13 ENCOUNTER — APPOINTMENT (OUTPATIENT)
Dept: CARDIAC CATH/INVASIVE PROCEDURES | Age: 58
DRG: 405 | End: 2023-01-13
Payer: MEDICAID

## 2023-01-13 LAB
ABSOLUTE EOS #: 0.14 K/UL (ref 0–0.44)
ABSOLUTE IMMATURE GRANULOCYTE: 0.05 K/UL (ref 0–0.3)
ABSOLUTE LYMPH #: 2.81 K/UL (ref 1.1–3.7)
ABSOLUTE MONO #: 1.26 K/UL (ref 0.1–1.2)
ANION GAP SERPL CALCULATED.3IONS-SCNC: 13 MMOL/L (ref 9–17)
BASOPHILS # BLD: 1 % (ref 0–2)
BASOPHILS ABSOLUTE: 0.06 K/UL (ref 0–0.2)
BUN BLDV-MCNC: 10 MG/DL (ref 6–20)
CALCIUM SERPL-MCNC: 8.5 MG/DL (ref 8.6–10.4)
CHLORIDE BLD-SCNC: 100 MMOL/L (ref 98–107)
CO2: 22 MMOL/L (ref 20–31)
CREAT SERPL-MCNC: 0.79 MG/DL (ref 0.7–1.2)
EOSINOPHILS RELATIVE PERCENT: 1 % (ref 1–4)
GFR SERPL CREATININE-BSD FRML MDRD: >60 ML/MIN/1.73M2
GLUCOSE BLD-MCNC: 165 MG/DL (ref 70–99)
GLUCOSE BLD-MCNC: 169 MG/DL (ref 75–110)
GLUCOSE BLD-MCNC: 186 MG/DL (ref 75–110)
GLUCOSE BLD-MCNC: 188 MG/DL (ref 75–110)
GLUCOSE BLD-MCNC: 249 MG/DL (ref 75–110)
HCT VFR BLD CALC: 45.2 % (ref 40.7–50.3)
HEMOGLOBIN: 14.4 G/DL (ref 13–17)
IMMATURE GRANULOCYTES: 0 %
LYMPHOCYTES # BLD: 22 % (ref 24–43)
MCH RBC QN AUTO: 29.7 PG (ref 25.2–33.5)
MCHC RBC AUTO-ENTMCNC: 31.9 G/DL (ref 28.4–34.8)
MCV RBC AUTO: 93.2 FL (ref 82.6–102.9)
MONOCYTES # BLD: 10 % (ref 3–12)
NRBC AUTOMATED: 0 PER 100 WBC
PARTIAL THROMBOPLASTIN TIME: 52.9 SEC (ref 20.5–30.5)
PARTIAL THROMBOPLASTIN TIME: 57.7 SEC (ref 20.5–30.5)
PDW BLD-RTO: 13.4 % (ref 11.8–14.4)
PLATELET # BLD: 271 K/UL (ref 138–453)
PMV BLD AUTO: 11.1 FL (ref 8.1–13.5)
POTASSIUM SERPL-SCNC: 3.6 MMOL/L (ref 3.7–5.3)
RBC # BLD: 4.85 M/UL (ref 4.21–5.77)
SEG NEUTROPHILS: 66 % (ref 36–65)
SEGMENTED NEUTROPHILS ABSOLUTE COUNT: 8.21 K/UL (ref 1.5–8.1)
SODIUM BLD-SCNC: 135 MMOL/L (ref 135–144)
WBC # BLD: 12.5 K/UL (ref 3.5–11.3)

## 2023-01-13 PROCEDURE — 85730 THROMBOPLASTIN TIME PARTIAL: CPT

## 2023-01-13 PROCEDURE — 6370000000 HC RX 637 (ALT 250 FOR IP): Performed by: STUDENT IN AN ORGANIZED HEALTH CARE EDUCATION/TRAINING PROGRAM

## 2023-01-13 PROCEDURE — 2500000003 HC RX 250 WO HCPCS: Performed by: STUDENT IN AN ORGANIZED HEALTH CARE EDUCATION/TRAINING PROGRAM

## 2023-01-13 PROCEDURE — 6360000002 HC RX W HCPCS

## 2023-01-13 PROCEDURE — 99232 SBSQ HOSP IP/OBS MODERATE 35: CPT | Performed by: NURSE PRACTITIONER

## 2023-01-13 PROCEDURE — 99291 CRITICAL CARE FIRST HOUR: CPT | Performed by: INTERNAL MEDICINE

## 2023-01-13 PROCEDURE — 93459 L HRT ART/GRFT ANGIO: CPT

## 2023-01-13 PROCEDURE — C9254 INJECTION, LACOSAMIDE: HCPCS

## 2023-01-13 PROCEDURE — 99153 MOD SED SAME PHYS/QHP EA: CPT

## 2023-01-13 PROCEDURE — 2580000003 HC RX 258

## 2023-01-13 PROCEDURE — C1892 INTRO/SHEATH,FIXED,PEEL-AWAY: HCPCS

## 2023-01-13 PROCEDURE — C1769 GUIDE WIRE: HCPCS

## 2023-01-13 PROCEDURE — 6360000002 HC RX W HCPCS: Performed by: STUDENT IN AN ORGANIZED HEALTH CARE EDUCATION/TRAINING PROGRAM

## 2023-01-13 PROCEDURE — 2709999900 HC NON-CHARGEABLE SUPPLY

## 2023-01-13 PROCEDURE — 99152 MOD SED SAME PHYS/QHP 5/>YRS: CPT

## 2023-01-13 PROCEDURE — B2161ZZ FLUOROSCOPY OF RIGHT AND LEFT HEART USING LOW OSMOLAR CONTRAST: ICD-10-PCS | Performed by: INTERNAL MEDICINE

## 2023-01-13 PROCEDURE — 6370000000 HC RX 637 (ALT 250 FOR IP): Performed by: INTERNAL MEDICINE

## 2023-01-13 PROCEDURE — 36415 COLL VENOUS BLD VENIPUNCTURE: CPT

## 2023-01-13 PROCEDURE — 4A023N7 MEASUREMENT OF CARDIAC SAMPLING AND PRESSURE, LEFT HEART, PERCUTANEOUS APPROACH: ICD-10-PCS | Performed by: INTERNAL MEDICINE

## 2023-01-13 PROCEDURE — 80048 BASIC METABOLIC PNL TOTAL CA: CPT

## 2023-01-13 PROCEDURE — B2111ZZ FLUOROSCOPY OF MULTIPLE CORONARY ARTERIES USING LOW OSMOLAR CONTRAST: ICD-10-PCS | Performed by: INTERNAL MEDICINE

## 2023-01-13 PROCEDURE — 2580000003 HC RX 258: Performed by: STUDENT IN AN ORGANIZED HEALTH CARE EDUCATION/TRAINING PROGRAM

## 2023-01-13 PROCEDURE — 82947 ASSAY GLUCOSE BLOOD QUANT: CPT

## 2023-01-13 PROCEDURE — 2000000000 HC ICU R&B

## 2023-01-13 PROCEDURE — 85025 COMPLETE CBC W/AUTO DIFF WBC: CPT

## 2023-01-13 PROCEDURE — 6370000000 HC RX 637 (ALT 250 FOR IP)

## 2023-01-13 PROCEDURE — 2500000003 HC RX 250 WO HCPCS

## 2023-01-13 PROCEDURE — C1894 INTRO/SHEATH, NON-LASER: HCPCS

## 2023-01-13 PROCEDURE — B2151ZZ FLUOROSCOPY OF LEFT HEART USING LOW OSMOLAR CONTRAST: ICD-10-PCS | Performed by: INTERNAL MEDICINE

## 2023-01-13 PROCEDURE — B2131ZZ FLUOROSCOPY OF MULTIPLE CORONARY ARTERY BYPASS GRAFTS USING LOW OSMOLAR CONTRAST: ICD-10-PCS | Performed by: INTERNAL MEDICINE

## 2023-01-13 PROCEDURE — 6360000004 HC RX CONTRAST MEDICATION

## 2023-01-13 RX ORDER — LEVETIRACETAM 500 MG/5ML
500 INJECTION, SOLUTION, CONCENTRATE INTRAVENOUS EVERY 12 HOURS
Status: DISCONTINUED | OUTPATIENT
Start: 2023-01-13 | End: 2023-01-13

## 2023-01-13 RX ORDER — LABETALOL HYDROCHLORIDE 5 MG/ML
10 INJECTION, SOLUTION INTRAVENOUS ONCE
Status: COMPLETED | OUTPATIENT
Start: 2023-01-13 | End: 2023-01-13

## 2023-01-13 RX ORDER — LEVETIRACETAM 5 MG/ML
500 INJECTION INTRAVASCULAR EVERY 12 HOURS
Status: DISCONTINUED | OUTPATIENT
Start: 2023-01-13 | End: 2023-01-15

## 2023-01-13 RX ORDER — HYDRALAZINE HYDROCHLORIDE 20 MG/ML
5 INJECTION INTRAMUSCULAR; INTRAVENOUS ONCE
Status: COMPLETED | OUTPATIENT
Start: 2023-01-13 | End: 2023-01-13

## 2023-01-13 RX ORDER — LEVETIRACETAM 500 MG/1
500 TABLET ORAL 2 TIMES DAILY
Status: DISCONTINUED | OUTPATIENT
Start: 2023-01-13 | End: 2023-01-22 | Stop reason: HOSPADM

## 2023-01-13 RX ORDER — LACOSAMIDE 100 MG/1
100 TABLET ORAL 2 TIMES DAILY
Status: DISCONTINUED | OUTPATIENT
Start: 2023-01-13 | End: 2023-01-22 | Stop reason: HOSPADM

## 2023-01-13 RX ORDER — ATORVASTATIN CALCIUM 80 MG/1
80 TABLET, FILM COATED ORAL NIGHTLY
Status: DISCONTINUED | OUTPATIENT
Start: 2023-01-13 | End: 2023-01-22 | Stop reason: HOSPADM

## 2023-01-13 RX ADMIN — SODIUM CHLORIDE, PRESERVATIVE FREE 20 MG: 5 INJECTION INTRAVENOUS at 08:09

## 2023-01-13 RX ADMIN — LACOSAMIDE 100 MG: 10 INJECTION INTRAVENOUS at 08:09

## 2023-01-13 RX ADMIN — FUROSEMIDE 40 MG: 10 INJECTION, SOLUTION INTRAMUSCULAR; INTRAVENOUS at 17:52

## 2023-01-13 RX ADMIN — Medication 10 MG: at 02:03

## 2023-01-13 RX ADMIN — LISINOPRIL 20 MG: 20 TABLET ORAL at 09:12

## 2023-01-13 RX ADMIN — FUROSEMIDE 40 MG: 10 INJECTION, SOLUTION INTRAMUSCULAR; INTRAVENOUS at 07:59

## 2023-01-13 RX ADMIN — LEVETIRACETAM 500 MG: 5 INJECTION INTRAVENOUS at 23:11

## 2023-01-13 RX ADMIN — LEVETIRACETAM 500 MG: 5 INJECTION INTRAVENOUS at 11:03

## 2023-01-13 RX ADMIN — Medication 1 TABLET: at 09:12

## 2023-01-13 RX ADMIN — SODIUM CHLORIDE, PRESERVATIVE FREE 20 MG: 5 INJECTION INTRAVENOUS at 20:41

## 2023-01-13 RX ADMIN — INSULIN LISPRO 2 UNITS: 100 INJECTION, SOLUTION INTRAVENOUS; SUBCUTANEOUS at 12:54

## 2023-01-13 RX ADMIN — SODIUM CHLORIDE, PRESERVATIVE FREE 10 ML: 5 INJECTION INTRAVENOUS at 08:09

## 2023-01-13 RX ADMIN — SODIUM CHLORIDE, PRESERVATIVE FREE 10 ML: 5 INJECTION INTRAVENOUS at 21:30

## 2023-01-13 RX ADMIN — ASPIRIN 81 MG: 81 TABLET, CHEWABLE ORAL at 07:59

## 2023-01-13 RX ADMIN — POTASSIUM CHLORIDE 10 MEQ: 7.46 INJECTION, SOLUTION INTRAVENOUS at 10:13

## 2023-01-13 RX ADMIN — HYDRALAZINE HYDROCHLORIDE 5 MG: 20 INJECTION INTRAMUSCULAR; INTRAVENOUS at 16:45

## 2023-01-13 RX ADMIN — POTASSIUM CHLORIDE 10 MEQ: 7.46 INJECTION, SOLUTION INTRAVENOUS at 08:59

## 2023-01-13 RX ADMIN — HEPARIN SODIUM 26 UNITS/KG/HR: 10000 INJECTION, SOLUTION INTRAVENOUS at 03:49

## 2023-01-13 RX ADMIN — CARVEDILOL 12.5 MG: 12.5 TABLET, FILM COATED ORAL at 17:52

## 2023-01-13 RX ADMIN — CARVEDILOL 12.5 MG: 12.5 TABLET, FILM COATED ORAL at 07:58

## 2023-01-13 RX ADMIN — POTASSIUM CHLORIDE 10 MEQ: 7.46 INJECTION, SOLUTION INTRAVENOUS at 07:56

## 2023-01-13 RX ADMIN — ATORVASTATIN CALCIUM 80 MG: 80 TABLET, FILM COATED ORAL at 20:41

## 2023-01-13 RX ADMIN — CEFTRIAXONE SODIUM 1000 MG: 10 INJECTION, POWDER, FOR SOLUTION INTRAVENOUS at 07:59

## 2023-01-13 ASSESSMENT — PAIN SCALES - GENERAL
PAINLEVEL_OUTOF10: 0

## 2023-01-13 NOTE — PROGRESS NOTES
[facility]   Progress Note                   Date:   1/13/2023  Patient name: Dorann Lesch  Date of admission:  1/7/2023  4:22 PM  MRN:   [id number]  YOB: 1965  PCP: Jennifer Greene MD    Reason for Admission:      Subjective: There were no acute events overnight, remained hemodynamically stable, denies chest pain, dyspnea, orthopnea or palpitations. Medications:   Scheduled Meds:   carvedilol  12.5 mg Oral BID WC    lisinopril  20 mg Oral Daily    levETIRAcetam  500 mg IntraVENous Q12H    furosemide  40 mg IntraVENous BID    lacosamide (VIMPAT) IVPB  100 mg IntraVENous BID    insulin lispro  0-8 Units SubCUTAneous Q6H    famotidine (PEPCID) injection  20 mg IntraVENous BID    cefTRIAXone (ROCEPHIN) IV  1,000 mg IntraVENous Q24H    atorvastatin  40 mg Oral Nightly    aspirin  81 mg Oral Daily    therapeutic multivitamin-minerals  1 tablet Oral Daily with breakfast    sodium chloride flush  5-40 mL IntraVENous 2 times per day       Continuous Infusions:   nitroGLYCERIN 20 mcg/min (01/12/23 1600)    heparin (PORCINE) Infusion 26 Units/kg/hr (01/13/23 0349)    dexmedetomidine 0.589 mcg/kg/hr (01/12/23 1600)    dextrose      sodium chloride 10 mL/hr at 01/12/23 2104       CBC:   Recent Labs     01/11/23  0701 01/12/23  0346 01/13/23  0614   WBC 12.5* 15.1* 12.5*   HGB 13.3 13.3 14.4    221 271     BMP:    Recent Labs     01/11/23  0701 01/12/23  0346 01/12/23  1301 01/12/23  1746 01/13/23  0614    137  --   --  135   K 3.7 3.0* 3.8 4.2 3.6*    100  --   --  100   CO2 22 21  --   --  22   BUN 13 12  --   --  10   CREATININE 0.78 0.86  --   --  0.79   GLUCOSE 155* 183*  --   --  165*     Hepatic: No results for input(s): AST, ALT, ALB, BILITOT, ALKPHOS in the last 72 hours. Troponin: No results for input(s): TROPONINI in the last 72 hours. BNP: No results for input(s): BNP in the last 72 hours.   Lipids: No results for input(s): CHOL, HDL in the last 72 hours.    Invalid input(s): LDLCALCU  INR: No results for input(s): INR in the last 72 hours. Objective:   Vitals: /70   Pulse 80   Temp 98.6 °F (37 °C) (Oral)   Resp 19   Ht 5' 6\" (1.676 m)   Wt 149 lb 11.1 oz (67.9 kg)   SpO2 95%   BMI 24.91 kg/m²     General appearance: awake, altered, in no apparent respiratory distress   HEENT: Head: Normocephalic, no lesions, without obvious abnormality  Neck: no JVD  Lungs: clear to auscultation bilaterally, no basilar rales, no wheezing   Heart: regular rate and rhythm, S1, S2 normal, no murmur, click, rub or gallop  Abdomen: soft, non-tender; bowel sounds normal  Extremities: No LE edema  Neurologic: not done. EK/10/23  Sinus tachycardia, q wave in anterior leads    Echocardiogram:  23  Summary  Left ventricle is normal in size. Global left ventricular systolic function  is moderately reduced. Visually estimated EF 35-40%. Global hypokinesis  worse in the apex and all apical segments. Definity used to improve delineation of left ventricular endocardial border. LV apical thrombus is noted. Measures 1.4cm x 0.6cm. Evidence of diastolic dysfunction. Thickened mitral valve leaflets. Trivial mitral regurgitation. Coronary Angiography:   19   Procedure Summary      1. Multivessel coronary artery disease. 2. Severe LV systolic dysfunction. 3. Mild aortic insufficiency   4. Distal aorta 40% stenosis. 5. Occluded left external iliac artery. 6. Severe diffuse disease of right iliac artery. Unable to pass IABP or   Impella      Recommendations      Medical therapy as needed. Risk factor modification. Routine Post Diagnostic Cath orders. Urgent surgical CABG. Discussed with CT surgery. IV lasix.          Assessment / Acute Cardiac Problems:   H/o MVCAD s/p CABG [LIMA-LAD, SVG-PDA,OM 1 & distal circumflex) 2019  Ischemic cardiomyopathy,LVEF Improved on last echo >65%  LV thrombus 1/4 x 0.6 cm  Diabetes type II, in DKA - resolved  H/o CVA  ALMITA  Left subclavian stenosis  Flash pulmonary edema  Hypertensive emergency  Subacute right temporal ischemic stroke, AMS with receptive aphasia  PVD      Plan of Treatment:   Cleared for [facility] today. Continue on heparin for LV thrombus, will require AC at discharge  Continue on IV diuresis bid  Continue on Coreg 12.5 mg bid  Continue on ASA and satin  On lisinopril 20 mg po qd  Wean off nitroglycerin  Monitor on telemetry  Keep NPO prior to cardiac cath  Neuro on board for management for stroke      Discussed with patient and nursing.        Clyde Gimenez MD MD  Fellow, [facility]   Pager - [phone]

## 2023-01-13 NOTE — PROGRESS NOTES
INTENSIVE CARE UNIT  Resident Physician Progress Note    Patient - Argenis Fenton  Date of Admission -  1/7/2023  4:22 PM  Date of Evaluation -  1/13/2023  Room and Bed Number -  0395/5834-35   Hospital Day - 6    HPI:     Cherry Landry is a 62 yo M with PMHx of PVD, right toe gangrene/osteomyelitis s/p AKA 2009, MV-CAD s/p PEA arrest, s/p CABG in 2019, s/p proximal R ICA stent, ICM with HFrEF 40%, T2DM, complete occlusion of L ICA and L vertebral artery and history of CVA with residual RUE deficits. Patient presented to the ED with altered mental status with last known well two days prior. Patient was found to be hypertensive with -250, tachycardic with HR 110s. Patient appeared disheveled. EKG showed ST elevation in V1-V3 with mild elevation in V4, cardiology was consulted, troponins were downtrending and they recommended heparin infusion. Patient glucose was 620 with beta hydroxy butyrate 2.43 and anion gap 26. Patient was started on insulin gtt. Initial Labs in ED:  Cr 1.22  Lactic 2.3  Glucose 620  BHB 2.43  Anion Gap 26  Bicarb 22     pH 7.35 / 52.3 / 28.8 / 52.6  LFT unremarkable  TSH normal  WBC 19.7  UA negative for UTI     CT head negative  CXR unremarkable     CTA HEAD AND NECK  1. 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 2.  Patent right cervical internal carotid artery stent demonstrates   mild-to-moderate in stent stenosis. Follow-up catheter angiogram may be   helpful for further evaluation. 3.  Multifocal areas of moderate to severe stenosis in the right middle   cerebral artery branches. 4.  Multifocal severe stenoses in the right anterior cerebral artery A2 and   A3 segments. 5.  Focal severe stenosis in the left subclavian artery secondary to   noncalcified atherosclerotic plaque at the level of the left vertebral artery   origin. SUBJECTIVE:     OVERNIGHT EVENTS:    No acute events overnight. Able to fall asleep on BiPAP this morning.      AWAKE & FOLLOWING COMMANDS:  [] No   [x] Yes    SECRETIONS Amount:  [x] Small [] Moderate  [] Large  [x] None  Color:     [] White [] Colored  [] Bloody    SEDATION:  RAAS Score:  [] Propofol gtt  [] Versed gtt  [] Ativan gtt   [x] No Sedation    PARALYZED:  [x] No    [] Yes    VASOPRESSORS:  [x] No    [] Yes  [] Levophed [] Dopamine [] Vasopressin  [] Dobutamine [] Phenylephrine [] Epinephrine      OBJECTIVE:     VITAL SIGNS:  BP (!) 145/71   Pulse 83   Temp 97.5 °F (36.4 °C)   Resp 17   Ht 5' 6\" (1.676 m)   Wt 149 lb 11.1 oz (67.9 kg)   SpO2 97%   BMI 24.91 kg/m²   Tmax over 24 hours:  Temp (24hrs), Av.1 °F (36.7 °C), Min:97.5 °F (36.4 °C), Max:98.8 °F (37.1 °C)      Patient Vitals for the past 8 hrs:   BP Temp Temp src Pulse Resp SpO2   23 0604 -- 97.5 °F (36.4 °C) -- -- -- --   23 0500 (!) 145/71 -- -- 83 17 97 %   23 0410 -- 97.6 °F (36.4 °C) Axillary -- -- --   23 0400 118/65 97.6 °F (36.4 °C) Axillary 85 18 96 %   23 0200 (!) 171/76 97.7 °F (36.5 °C) Axillary 99 20 99 %   23 0031 (!) 166/76 -- -- 100 -- 98 %   23 0030 (!) 171/146 -- -- 97 15 95 %   23 0020 -- -- -- 98 15 100 %   23 0010 -- -- -- (!) 104 17 98 %   23 0008 -- 98.2 °F (36.8 °C) Axillary -- -- --   23 0000 (!) 173/104 98.2 °F (36.8 °C) Axillary (!) 104 12 98 %           Intake/Output Summary (Last 24 hours) at 2023 0743  Last data filed at 2023 2332  Gross per 24 hour   Intake 1267.97 ml   Output 3410 ml   Net -2142.03 ml           Wt Readings from Last 3 Encounters:   23 149 lb 11.1 oz (67.9 kg)   23 190 lb (86.2 kg)   22 180 lb (81.6 kg)     Body mass index is 24.91 kg/m². PHYSICAL EXAM:  GEN:                  Arousable, alert and oriented x4; able to answer questions clearly.  On Bipap  EYES:                pupils equal, round, and reactive to light  HEENT:            Normocephalic, without obvious abnormality  LUNGS:            good air exchange on positive air pressure  CV:                     regular rate and rhythm  ABDOMEN:     soft and non-distended, no tenderness to palpation  MSK:                  R AKA - well healed without any dehiscence or pressure wounds  NEURO[de-identified]           Arousable, residual RUE deficit, able to grasp with left hand and wiggles left toes and RLE stump  EXTREMITIES:  distal pulses intact, R AKA, no skin breakdown on visible skin      MEDICATIONS:  Scheduled Meds:   carvedilol  12.5 mg Oral BID WC    lisinopril  20 mg Oral Daily    levETIRAcetam  500 mg IntraVENous Q12H    furosemide  40 mg IntraVENous BID    lacosamide (VIMPAT) IVPB  100 mg IntraVENous BID    insulin lispro  0-8 Units SubCUTAneous Q6H    famotidine (PEPCID) injection  20 mg IntraVENous BID    cefTRIAXone (ROCEPHIN) IV  1,000 mg IntraVENous Q24H    atorvastatin  40 mg Oral Nightly    aspirin  81 mg Oral Daily    therapeutic multivitamin-minerals  1 tablet Oral Daily with breakfast    sodium chloride flush  5-40 mL IntraVENous 2 times per day     Continuous Infusions:   nitroGLYCERIN 20 mcg/min (01/12/23 1600)    heparin (PORCINE) Infusion 26 Units/kg/hr (01/13/23 0349)    dexmedetomidine 0.589 mcg/kg/hr (01/12/23 1600)    dextrose      sodium chloride 10 mL/hr at 01/12/23 2104     PRN Meds:   bisacodyl, 5 mg, Daily PRN  glucose, 4 tablet, PRN  dextrose bolus, 125 mL, PRN   Or  dextrose bolus, 250 mL, PRN  glucagon (rDNA), 1 mg, PRN  dextrose, , Continuous PRN  heparin (porcine), 4,000 Units, PRN  heparin (porcine), 2,000 Units, PRN  dextrose bolus, 125 mL, PRN   Or  dextrose bolus, 250 mL, PRN  potassium chloride, 10 mEq, PRN  magnesium sulfate, 1,000 mg, PRN  sodium phosphate IVPB, 10 mmol, PRN   Or  sodium phosphate IVPB, 15 mmol, PRN   Or  sodium phosphate IVPB, 20 mmol, PRN  sodium chloride flush, 5-40 mL, PRN  sodium chloride, , PRN  ondansetron, 4 mg, Q8H PRN   Or  ondansetron, 4 mg, Q6H PRN  polyethylene glycol, 17 g, Daily PRN  acetaminophen, 650 mg, Q6H PRN   Or  acetaminophen, 650 mg, Q6H PRN      SUPPORT DEVICES: [] Ventilator [x] BIPAP  [] Nasal Cannula [] Room Air    DATA:  Complete Blood Count:   Recent Labs     01/11/23  0701 01/12/23  0346 01/13/23  0614   WBC 12.5* 15.1* 12.5*   RBC 4.53 4.45 4.85   HGB 13.3 13.3 14.4   HCT 41.8 40.9 45.2   MCV 92.3 91.9 93.2   MCH 29.4 29.9 29.7   MCHC 31.8 32.5 31.9   RDW 13.3 13.1 13.4    221 271   MPV 11.4 11.1 11.1          Last 3 Blood Glucose:   Recent Labs     01/11/23  0701 01/12/23 0346 01/13/23  0614   GLUCOSE 155* 183* 165*          PT/INR:    Lab Results   Component Value Date/Time    PROTIME 13.7 08/23/2022 11:39 PM    INR 1.1 08/23/2022 11:39 PM     PTT:    Lab Results   Component Value Date/Time    APTT 52.9 01/13/2023 06:14 AM       Comprehensive Metabolic Profile:   Recent Labs     01/11/23  0701 01/12/23  0346 01/12/23  1301 01/12/23  1746 01/13/23  0614    137  --   --  135   K 3.7 3.0* 3.8 4.2 3.6*    100  --   --  100   CO2 22 21  --   --  22   BUN 13 12  --   --  10   CREATININE 0.78 0.86  --   --  0.79   GLUCOSE 155* 183*  --   --  165*   CALCIUM 8.1* 8.2*  --   --  8.5*        Magnesium:   Lab Results   Component Value Date/Time    MG 1.5 01/12/2023 03:46 AM    MG 1.7 01/11/2023 07:01 AM    MG 2.0 01/10/2023 04:30 AM     Phosphorus:   Lab Results   Component Value Date/Time    PHOS 2.3 01/12/2023 03:46 AM    PHOS 3.3 01/11/2023 07:01 AM    PHOS 2.3 01/10/2023 04:30 AM     Ionized Calcium:   Lab Results   Component Value Date/Time    CAION 1.13 07/04/2019 04:40 AM    CAION 0.97 04/09/2019 12:21 AM    CAION 1.01 04/07/2019 07:47 PM        Urinalysis:   Lab Results   Component Value Date/Time    NITRU NEGATIVE 01/07/2023 05:08 PM    COLORU Yellow 01/07/2023 05:08 PM    PHUR 5.5 01/07/2023 05:08 PM    WBCUA 0 TO 2 01/07/2023 05:08 PM    RBCUA 0 TO 2 01/07/2023 05:08 PM    MUCUS NOT REPORTED 04/14/2019 01:01 AM    TRICHOMONAS NOT REPORTED 04/14/2019 01:01 AM    YEAST NOT REPORTED 04/14/2019 01:01 AM    BACTERIA NOT REPORTED 04/14/2019 01:01 AM    SPECGRAV 1.036 01/07/2023 05:08 PM    LEUKOCYTESUR NEGATIVE 01/07/2023 05:08 PM    UROBILINOGEN Normal 01/07/2023 05:08 PM    BILIRUBINUR NEGATIVE 01/07/2023 05:08 PM    GLUCOSEU 3+ 01/07/2023 05:08 PM    KETUA SMALL 01/07/2023 05:08 PM    AMORPHOUS NOT REPORTED 04/14/2019 01:01 AM       HgBA1c:    Lab Results   Component Value Date/Time    LABA1C 12.7 01/09/2023 04:32 PM     TSH:    Lab Results   Component Value Date/Time    TSH 0.45 01/07/2023 11:24 PM     Lactic Acid: No results found for: LACTA   Troponin: No results for input(s): TROPONINI in the last 72 hours.       ASSESSMENT:     Patient Active Problem List    Diagnosis Date Noted    Stenosis of right middle cerebral artery 01/09/2023    At risk of seizures 01/09/2023    Cardiomyopathy, ischemic 01/09/2023    Acute ischemic right MCA stroke ([address] 01/09/2023    Altered mental status 01/07/2023    DKA, type 2, not at goal [facility]) 01/07/2023    Pressure injury of right heel, unstageable ([address] 12/10/2020    Hx of AKA (above knee amputation), right ([address] 12/10/2020    Erectile dysfunction associated with type 2 diabetes mellitus ([address] 12/10/2020    Above knee amputation of right lower extremity ([address] 09/28/2019    Coronary artery disease 08/23/2019    Ischemic gangrene ([address]     Pyogenic inflammation of bone ([address]     Status post angiography of extremity 08/20/2019    Leukocytosis     Right-sided extracranial carotid artery stenosis 07/03/2019    Essential hypertension     Type 2 diabetes mellitus with diabetic neuropathy, without long-term current use of insulin (HCC)     Cellulitis of right lower extremity     Moderate malnutrition ([address] 04/26/2019    Acute CVA (cerebrovascular accident) ([address] 04/25/2019    Thrombocytosis     Right foot drop     Carotid stenosis, bilateral     Cerebral multi-infarct state     Multi infarct state     Hepatitis     Acute metabolic encephalopathy Abnormal CT of the head     PAD (peripheral artery disease) ([facility] 04/07/2019    Subacute osteomyelitis of right tibia [facility])           PLAN:     Neuro  Arousable, answering questions correctly, following commands  Neurology following  CTA head/neck 1/6 showing severe stenosis in L subclavian artery, R TOAN, MCA, ICA and vertebral arteries  Endovascular surgery: no intervention as CTA is stable  MRI brain 1/8 - punctate R temporal periventricular stroke  Neurology to repeat MRI due to delayed neurological recovery (1/12)  On ASA and Lipitor  Neuroendovascular - continue ASA for punctate ischemic stroke - stenoses are chronic and unchanged since 2019, no intervention at this time  LTME 1/8 - moderate encephalopathy, left anterior temporal sharps suggesting underlying lesion or dysfunction, increased risk for focal seizures   Keppra 500mg Q12hrs  Modified barium swallow 1/9 for mildly thickened liquids  Thiamine and folic acid given in ED     Cardiology  Hx CABG, R ICA stent, vertebral artery stenosis, TOAN MCA and ICA stenosis  EKG in ED showing ST elevation in V1-V3  Cardiology following, plan for cardiac cath pending improvement in neurologic status  Plan is to go to Cath lab today (1/13/2023); currently NPO for procedure.   Heparin gtt  Nitro gtt 50  Troponin 92-  BNP [phone]   Lopressor 2.5 q 6h  Started 5 mg lisinopril 1/11/2023  -250 in ED - initially on cardene gtt, now off cardene gtt     Pulmonary  BiPap 50% FiO2  POC pH 1/12 7.458  ABG 1/10 7.220/55/38/21  CXR 1/10 overnight showing concern for flash pulm edema  CXR 1/11 mild cardiomegaly with diffuse pulmonary infiltrates  Repeat CXR this morning pending     Renal  Cr 0.79   U out 3410 (external urinary catheter) + 3x unmeasured  Lasix 40 BID  K 3.6  NS @ 50 ml/hr     GI  Modified barium swallow 1/9 passed for mildly thickened liquids  NPO for cath lab procedure 1/13  Pepcid 20 BID     ID  WBC  12.5  Ceftriaxone, ending 1/15  Blood cultures no growth x 5 day     Endo  Glucose 165  HDISS q 4 h  NS @ 50 ml/hr    Martina Corral DO. Emergency Medicine Resident, PGY-1  1/13/2023 7:43 AM    .     Martina Corral DO  1/13/2023 7:43 AM       Attending Physician Statement  I have discussed the care of Diana Padron, including pertinent history and exam findings with the resident. I have reviewed the key elements of all parts of the encounter with the resident. I have seen and examined the patient with the resident. I agree with the assessment and plan and status of the problem list as documented. I seen the patient during around today, chart reviewed, labs and medications reviewed overnight events noted. Overnight patient was on BiPAP he did tolerate it well he is on 4 L nasal cannula this morning and he is maintaining saturation 98%. He is on Lasix 40 mg twice daily potassium is 3.6 urine output is 2.5 L last 24 hours. Patient is off Precedex drip remains confused and disoriented. Cardiac catheterization by cardiology today. Will continue with Lasix replace potassium monitor electrolytes monitor intake and output and continue with lisinopril and Coreg and will adjust both lisinopril and Coreg blood pressures better controlled. Continue with Vimpat and Keppra per neurology. Currently on heparin drip      Discussed with nursing staff, treatment and plan discussed. Discussed with respiratory therapist.    Total critical care time caring for this patient with life threatening, unstable organ failure, including direct patient contact, management of life support systems, review of data including imaging and labs, discussions with other team members and physicians at least 35 min so far today, excluding procedures. Please note that this chart was generated using voice recognition Dragon dictation software. Although every effort was made to ensure the accuracy of this automated transcription, some errors in transcription may have occurred.      Dionisio Gin Tipton MD  1/13/2023 2:36 PM

## 2023-01-13 NOTE — PROGRESS NOTES
Endovascular Neurosurgery Progress Note    SUBJECTIVE:   Much improved, talking happily at bedside, no complaints overnight    Still on nitro drip    Review of Systems:  CONSTITUTIONAL:  negative for fevers, chills, fatigue and malaise    EYES:  negative for double vision, blurred vision and photophobia     HEENT:  negative for tinnitus, epistaxis and sore throat    RESPIRATORY:  negative for cough, shortness of breath, wheezing    CARDIOVASCULAR:  negative for chest pain, palpitations, syncope, edema    GASTROINTESTINAL:  negative for nausea, vomiting    GENITOURINARY:  negative for incontinence    MUSCULOSKELETAL:  negative for neck or back pain    NEUROLOGICAL:  Negative for weakness and tingling  negative for headaches and dizziness    PSYCHIATRIC:  negative for anxiety      Review of systems otherwise negative. OBJECTIVE:     Vitals:    23 1021   BP:    Pulse:    Resp:    Temp: 98.2 °F (36.8 °C)   SpO2:         General:  Gen: normal habitus, NAD  HEENT: NCAT, mucosa moist  Cvs: RRR, S1 S2 normal  Resp: symmetric unlabored breathing  Abd: s/nd/nt  Ext: no edema  Skin: no lesions seen, warm and dry    Neuro:  Gen: somnolence, on precedex drip  Lang/speech: somonolence  CN: PERRL, EOMI, VFF, V1-3 intact, mild right face droop, hearing intact  Motor: grossly 5/5 UE and LE on the left, right arm 4/5, right leg amputated above knee 1/5  Sense: decrease on the left  Coord: deferred  DTR: deferred  Gait: deferred    NIH Stroke Scale:   1a  Level of consciousness: 0   1b. LOC questions:  0 - answers both questions correctly   1c. LOC commands: 0 - performs both tasks correctly   2. Best Gaze: 0 - normal   3. Visual: 0 - no visual loss   4. Facial Palsy: 1 - minor paralysis (flattened nasolabial fold, asymmetric on smiling)   5a. Motor left arm: 0 - no drift, limb holds 90 (or 45) degrees for full 10 seconds   5b. Motor right arm: 1   6a.  Motor left le - no drift; leg holds 30 degree position for full 5 seconds   6b  Motor right leg:  3 - no effort against gravity; leg falls to bed immediately (pt has R AKA)   7. Limb Ataxia: 0 - absent   8. Sensory: 1 - mild to moderate sensory loss; patient feels pinprick is less sharp or is dull on the affected side; there is a loss of superficial pain with pinprick but patient is aware of being touched    9. Best Language:  0   10. Dysarthria: 0 - normal   11. Extinction and Inattention: 0 - no abnormality         Total:   6     MRS: 05      LABS:   Reviewed. Lab Results   Component Value Date    HGB 14.4 01/13/2023    WBC 12.5 (H) 01/13/2023     01/13/2023     01/13/2023    BUN 10 01/13/2023    CREATININE 0.79 01/13/2023    AST 16 01/07/2023    ALT 18 01/07/2023    MG 1.5 (L) 01/12/2023    APTT 52.9 (H) 01/13/2023    INR 1.1 08/23/2022      No results found for: COVID19    RADIOLOGY:   Images were personally reviewed including:   Cerebral angiogram 7/3/2019:  1. Right proximal immediately after the origin cervical ICA atherosclerotic changes resulting into a focal area of stenosis measuring approximately 65% per NASCET criteria. 2.  The above mentioned stenosis was treated with post balloon angioplasty and stenting using a 7-10x 30 mm Acculink stent with no significant residual stenosis. 3.  Right common femoral artery severe stenosis with good collaterals. CTA head neck 1/7/23: R ICA Acculink stent patent and L ICA chronic occlusion seen before 2019. Dominant R VA and chronically occluded L VA       MRI brain w/o contrast 1/8/23: punctate right temporal periventricular stroke. 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  Patient presented to the ED with AMS and found to have glucose of 620 and possible sepsis. Endovascular consulted for chronic L ICA and L VA occlusion and hx of R ICA stent in 2019. Neuro exam was stable  PLAN:   - no intervention, CTA is stable and L ICA and L VA occlusion are chronic and known before 2019  - R ICA stent is patent  - patient has a punctate right temporal periventricular stroke, he is currently already on aspirin, recommend continue it  - patient is on heparin for ACS, cardiology and ICU managing it  - ICU management   - PT OT and SLP       Case discussed with Dr. Sindy Govea attending.     Ovidio Kaur MD  Stroke, [facility]  [facility]  Electronically signed 1/13/2023 at 11:14 AM

## 2023-01-13 NOTE — PLAN OF CARE
Problem: Discharge Planning  Goal: Discharge to home or other facility with appropriate resources  1/13/2023 1242 by Thong Mosqueda RN  Outcome: Progressing  1/13/2023 0019 by Jolie Baez RN  Outcome: Progressing     Problem: Chronic Conditions and Co-morbidities  Goal: Patient's chronic conditions and co-morbidity symptoms are monitored and maintained or improved  1/13/2023 1242 by Thong Mosqueda RN  Outcome: Progressing  1/13/2023 0019 by Jolie Baez RN  Outcome: Progressing     Problem: Confusion  Goal: Confusion, delirium, dementia, or psychosis is improved or at baseline  Description: INTERVENTIONS:  1. Assess for possible contributors to thought disturbance, including medications, impaired vision or hearing, underlying metabolic abnormalities, dehydration, psychiatric diagnoses, and notify attending LIP  2. [place] high risk fall precautions, as indicated  3. Provide frequent short contacts to provide reality reorientation, refocusing and direction  4. Decrease environmental stimuli, including noise as appropriate  5. Monitor and intervene to maintain adequate nutrition, hydration, elimination, sleep and activity  6. If unable to ensure safety without constant attention obtain sitter and review sitter guidelines with assigned personnel  7. Initiate Psychosocial CNS and Spiritual Care consult, as indicated  1/13/2023 1242 by Thong Mosqueda RN  Outcome: Progressing  1/13/2023 0019 by Jolie Baez RN  Outcome: Progressing     Problem: Skin/Tissue Integrity  Goal: Absence of new skin breakdown  Description: 1. Monitor for areas of redness and/or skin breakdown  2. Assess vascular access sites hourly  3. Every 4-6 hours minimum:  Change oxygen saturation probe site  4. Every 4-6 hours:  If on nasal continuous positive airway pressure, respiratory therapy assess nares and determine need for appliance change or resting period.   1/13/2023 1242 by Thong Mosqueda RN  Outcome: Progressing  1/13/2023 0019 by Katie Bro RN  Outcome: Progressing     Problem: Safety - Adult  Goal: Free from fall injury  1/13/2023 1242 by Jonathan Siemens, RN  Outcome: Progressing  Flowsheets (Taken 1/13/2023 1235)  Free From Fall Injury: Instruct family/caregiver on patient safety  1/13/2023 0019 by Katie Bro RN  Outcome: Progressing     Problem: ABCDS Injury Assessment  Goal: Absence of physical injury  1/13/2023 1242 by Jonathan Siemens, RN  Outcome: Progressing  1/13/2023 0019 by Katie Bro RN  Outcome: Progressing     Problem: Pain  Goal: Verbalizes/displays adequate comfort level or baseline comfort level  1/13/2023 1242 by Jonathan Siemens, RN  Outcome: Progressing  1/13/2023 0019 by Katie Bro RN  Outcome: Progressing

## 2023-01-13 NOTE — CONSENT
Informed Consent for Cardiac catheterization Note    I have discussed with the patient and his son the rationale for cardiac catheterization procedure; its benefits in diagnosing and treating coronary artery disease; and its risk which includes bleeding, arrhythmia and death. The patient and his son had an opportunity to ask questions and had agreed to proceed with the procedure.     Electronically signed by Lio Herrera MD on 1/13/23 at 1:24 PM EST

## 2023-01-13 NOTE — OP NOTE
[facility]  Cardiac catheterization note        Date:   1/13/2023  Patient name: Diana Padron  Date of admission:  1/7/2023  4:22 PM  MRN:   [id number]  YOB: 1965    Operators:  Rayna Flor MD (Attending Physician)    Pre Procedure Conscious Sedation Data:    ASA Class:    [] I [] II [x] III [] IV    Mallampati Class:  [] I [x] II [] III [] IV      Indications:  NSTEMI   CABG     Procedure:   Left heart catheterization   Selective left and right coronary angiography   Graft angiography   Left ventriculography       Access:  [x] Femoral  [] Radial  artery       [x] Right  [] Left    Procedure: After informed consent was obtained with explanation of the risks and benefits, patient was brought to the cath lab. The access area was prepped and draped in sterile fashion. 1% lidocaine was used for local block. The artery was cannulated with 6  Fr sheath with brisk arterial blood return. The side port was frequently flushed and aspirated with normal saline. Findings:    Angiographic Findings       Cardiac Arteries and Lesion Findings       LMCA: Mild irregularities 20-30%. LAD: Ostial to mid long 80% heavily calcified stenosis. LIMA-LAD appears to be patent as seen filling retrogradely on left coronary angiogram. There is 99% stenosis at proximal left subclavian artery at origin of LIMA, leading to its underfilling. LCx: Small caliber vessel. Proximal and distal 70% stenosis  OM1 has high origin with ostial 90% stenosis  SVG jump graft to OM1 and OM3 is patent and normal     RCA: Mid chronic total occlusion   SVG-RPDA is patent with mild disease   RPDA distal to anastomosis is patent with mild disease     Coronary Tree       Dominance: Right       LV Analysis   LV function assessed as:Normal.   Ejection Fraction   +----------------------------------------------------------------------+---+   ! Method                                                                ! EF%!   +----------------------------------------------------------------------+---+   !LV gram                                                               !50 !   +----------------------------------------------------------------------+---+      LV Segment Contractility        1 - Normal    3 - Mild         5 - Severe       7 - Dyskinesis    hypokinesis      hypokinesis        2 -           4 - Moderate     6 - Akinesis     8 - Aneurysm    Hypokinesis   hypokinesis       Estimated Blood Loss:            [] Minimal 10 cc   [x] Minimal < 25 cc      [] Moderate 25-50 cc         [] >50 cc    Procedure Summary        1. Multivessel CAD    2. Patent SVG-OM1/OM3 and SVG-RPDA grafts    3. Critical stenosis of left subclavian artery proximal to LIMA origin.    LIMA-LAD appears to be patent on left coronary angiogram.    4. Overall preserved LV systolic function        Recommendations        Routine Post Diagnostic Cath orders.    Medical therapy    Vascular surgery consult for consideration of subclavian artery stent     Electronically signed by Chel Andersen MD on 1/13/2023 at 3:30 PM  Cardiovascular fellow  [facility]      Attending Physician  I was present during the entire procedure and performed all the critical elements of it.     Crispin Vegas MD, FAC, [facility]

## 2023-01-13 NOTE — PLAN OF CARE
Problem: Discharge Planning  Goal: Discharge to home or other facility with appropriate resources  Outcome: Progressing  Flowsheets (Taken 1/12/2023 0800)  Discharge to home or other facility with appropriate resources:   Identify barriers to discharge with patient and caregiver   Identify discharge learning needs (meds, wound care, etc)     Problem: Chronic Conditions and Co-morbidities  Goal: Patient's chronic conditions and co-morbidity symptoms are monitored and maintained or improved  Outcome: Progressing  Flowsheets (Taken 1/12/2023 0800)  Care Plan - Patient's Chronic Conditions and Co-Morbidity Symptoms are Monitored and Maintained or Improved:   Monitor and assess patient's chronic conditions and comorbid symptoms for stability, deterioration, or improvement   Collaborate with multidisciplinary team to address chronic and comorbid conditions and prevent exacerbation or deterioration   Update acute care plan with appropriate goals if chronic or comorbid symptoms are exacerbated and prevent overall improvement and discharge     Problem: Confusion  Goal: Confusion, delirium, dementia, or psychosis is improved or at baseline  Description: INTERVENTIONS:  1. Assess for possible contributors to thought disturbance, including medications, impaired vision or hearing, underlying metabolic abnormalities, dehydration, psychiatric diagnoses, and notify attending LIP  2. [place] high risk fall precautions, as indicated  3. Provide frequent short contacts to provide reality reorientation, refocusing and direction  4. Decrease environmental stimuli, including noise as appropriate  5. Monitor and intervene to maintain adequate nutrition, hydration, elimination, sleep and activity  6. If unable to ensure safety without constant attention obtain sitter and review sitter guidelines with assigned personnel  7.  Initiate Psychosocial CNS and Spiritual Care consult, as indicated  Outcome: Progressing     Problem: Skin/Tissue Integrity  Goal: Absence of new skin breakdown  Description: 1. Monitor for areas of redness and/or skin breakdown  2. Assess vascular access sites hourly  3. Every 4-6 hours minimum:  Change oxygen saturation probe site  4. Every 4-6 hours:  If on nasal continuous positive airway pressure, respiratory therapy assess nares and determine need for appliance change or resting period.   Outcome: Progressing     Problem: Safety - Adult  Goal: Free from fall injury  Outcome: Progressing     Problem: ABCDS Injury Assessment  Goal: Absence of physical injury  Outcome: Progressing     Problem: Pain  Goal: Verbalizes/displays adequate comfort level or baseline comfort level  Outcome: Progressing  Flowsheets (Taken 1/12/2023 5110)  Verbalizes/displays adequate comfort level or baseline comfort level:   Encourage patient to monitor pain and request assistance   Assess pain using appropriate pain scale   Administer analgesics based on type and severity of pain and evaluate response   Implement non-pharmacological measures as appropriate and evaluate response   Consider cultural and social influences on pain and pain management   Notify Licensed Independent Practitioner if interventions unsuccessful or patient reports new pain

## 2023-01-13 NOTE — CARE COORDINATION
Transitional planning. Carmencita Dunn with [facility] called, they received referral and will not have any beds available until sometime next week. He will call if they have any available beds.

## 2023-01-13 NOTE — PROGRESS NOTES
Neurology Nurse Practitioner Progress Note      INTERVAL HISTORY: This is a 61 y.o.  male admitted 1/7/2023 for altered mentation. This is a follow-up neurology progress note. The patient was examined and the chart was reviewed. Discussed with the RN. There were no acute events overnight. Pt was alert, able to tell his name; wasn't able to say his brother's name who was at  his bed side. He has been unable to provide any history or orientation answers, intermittent tangential speech with perseveration & anomia; he was trying to mimic today but wasn't able to follow simple commands consistently. HPI: Shaheed Sood is a 61 y.o. male with H/O PEA arrest x 2, s/p CABG x 4 (4/2019), ischemic cardiomyopathy, multi-infarct state, R toe osteomyelitis s/p R AKA (2009), HTN, HLD, DM, PAD, prior head trauma, osteolysis, osteomyelitis, who was admitted 1/7/2023 for altered mentation. Last known well was 2 days ago. Patient lives alone, apparently was able to complete his ADLs. Reportedly he was found on the ground, somewhat unresponsive and unable to speak properly as his speech was garbled. EMS were called who found him disheveled, awake but unable to provide any history; moving all limbs. His speech was garbled. His blood glucose was 250, was hypertensive 200's/100's and tachycardic 110's. Patient was brought to [facility] ED for evaluation. Neurology was consulted for encephalopathy. Patient is known to our service from his previous admission. 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 Pt s/p R ICA stenting (4/2019).      atorvastatin  80 mg Oral Nightly    carvedilol  12.5 mg Oral BID WC    lisinopril  20 mg Oral Daily    levETIRAcetam  500 mg IntraVENous Q12H    furosemide  40 mg IntraVENous BID    lacosamide (VIMPAT) IVPB  100 mg IntraVENous BID    insulin lispro  0-8 Units SubCUTAneous Q6H    famotidine (PEPCID) injection  20 mg IntraVENous BID    cefTRIAXone (ROCEPHIN) IV  1,000 mg IntraVENous Q24H    aspirin  81 mg Oral Daily    therapeutic multivitamin-minerals  1 tablet Oral Daily with breakfast    sodium chloride flush  5-40 mL IntraVENous 2 times per day       Past Medical History:   Diagnosis Date    CAD (coronary artery disease) 04/07/2019    triple bypass    Cardiac arrest (Formerly [facility]) 04/07/2019    Diabetes mellitus (Formerly [facility]) 04/2019    ON RX    Hyperlipidemia 04/2019    ON RX HAS NOT HAD SINCE 06/30/2019    Hypertension 04/2019    ON RX NONE SINCE 06/2019    Osteolysis 04/07/2019    R Knee hardware    PEA (Pulseless electrical activity) (Formerly [facility]) 04/07/2019    PVD (peripheral vascular disease) (Formerly [facility]) 04/07/2019    Wound, open 09/2019    RIGHT STUMP DRAINING MOD AMT BLOODY DRAINAGE       Past Surgical History:   Procedure Laterality Date    ABOVE KNEE AMPUTATION Right 09/28/2019    AMPUTATION ABOVE KNEE Right 09/28/2019    RIGHT ABOVE KNEE AMPUTATION performed by Audrey Box MD at [facility] OR    CARDIAC CATHETERIZATION  04/07/2019    [facility]    CAROTID STENT PLACEMENT  07/03/2019    ACCULINK CAROTID STENT PLACED, MRI CONDITIONAL 5, 3T OK.     CORONARY ARTERY BYPASS GRAFT  04/07/2019    x4 per Dr. SHIREEN Moffett [place], [place] TRIPLE BYPASS    CORONARY ARTERY BYPASS GRAFT N/A 04/07/2019    EMERGENT CPR,  CORONARY ARTERY BYPASS X4, ON PUMP ,CHEST LEFT OPEN; SWAN, ANGELA PER ANESTHESIA performed by Johnathon Moffett MD at [facility] CVOR    FEMUR FRACTURE SURGERY Right 2000    FEMUR SURGERY      unsure if correct known tibial surgery    HARDWARE REMOVAL Right 04/07/2019    R Knee internal screw protruding through skin    HARDWARE REMOVAL Right 04/22/2019    HARDWARE REMOVAL RIGHT TIBIA,    INSERT / REPLACE / REMOVE PACEMAKER  01/13/2023    Epicardial leads present from CABG - abandoned leads - NOT MRI Safe - NO MRI - KMM (MRI Tech)    LEG AMPUTATION BELOW KNEE Right 08/25/2019    RIGHT FOOT GUILLOTINE AMPUTATION performed by Audrey Box MD at [facility] OR    LEG AMPUTATION BELOW KNEE  Right 08/27/2019    RIGHT BELOW KNEE AMPUTATION [facility]AL[facility]ATION [facility] 04/08[facility]    STERNAL  WOUND WASHOUT AND CLOSURE S/P CABG performed by Helena Pallas, MD at [address] [place] [address] N/A 04/22/2019    HARDWARE REMOVAL RIGHT TIBIA, IRRIGATION AND DEBRIDEMENT [address] [facility] performed by Joellen Nolasco MD at [facility] Right 2000    Fell off roof, external fixation and internal hardware    TONSILLECTOMY  1974       PHYSICAL EXAM:    Blood pressure 139/70, pulse 80, temperature 98.2 °F (36.8 °C), resp. rate 19, height 5' 6\" (1.676 m), weight 149 lb 11.1 oz (67.9 kg), SpO2 95 %.       ROS: Unable to perform due to patient's aphasia        Limited Neurological Examination:  Pt was alert, able to tell his name; wasn't able to say his brother's name who was at his bed side  He has been unable to provide any history or orientation answers  Has intermittent tangential speech with perseveration & anomia  When asked to wiggle his toes, he stated \"I only have 5\"  Today he was trying to mimic but wasn't able to follow simple commands consistently   R AKA; was able to move rest of the limbs  DTRs - intact  L plantar - flexor response          DATA    Lab Results   Component Value Date    WBC 12.5 (H) 01/13/2023    RBC 4.85 01/13/2023    HGB 14.4 01/13/2023    HCT 45.2 01/13/2023     01/13/2023    ALT 18 01/07/2023    AST 16 01/07/2023     01/13/2023    K 3.6 (L) 01/13/2023    MG 1.5 (L) 01/12/2023    PHOS 2.3 (L) 01/12/2023     01/13/2023    AMMONIA 39 01/12/2023    CREATININE 0.79 01/13/2023    BUN 10 01/13/2023    CO2 22 01/13/2023    TSH 0.45 01/07/2023    INR 1.1 08/23/2022    [id number] 1994 (H) 04/17/2019    FOLATE 17.6 04/17/2019    LABA1C 12.7 (H) 01/09/2023    LABMICR 8 12/15/2020     Lab Results   Component Value Date    CHOL 114 01/09/2023    CHOL 242 (H) 01/13/2022    CHOL 109 12/15/2020     Lab Results   Component Value Date    TRIG 135 01/09/2023    TRIG 637 (H) 01/13/2022    TRIG 210 (H) 12/15/2020     Lab Results   Component Value Date    HDL 26 (L) 01/09/2023    HDL 24 (L) 01/13/2022    HDL 25 (L) 12/15/2020     Lab Results   Component Value Date    LDLCHOLESTEROL 61 01/09/2023    LDLCHOLESTEROL      01/13/2022    LDLCHOLESTEROL 42 12/15/2020     Lab Results   Component Value Date    VLDL NOT REPORTED 01/13/2022    VLDL NOT REPORTED (H) 12/15/2020    VLDL NOT REPORTED 04/17/2019     Lab Results   Component Value Date    CHOLHDLRATIO 4.4 01/09/2023    CHOLHDLRATIO 10.1 (H) 01/13/2022    CHOLHDLRATIO 4.4 12/15/2020         DIAGNOSTIC DATA:  CT HEAD (1/7/2023): No acute intracranial abnormality        MRI BRAIN (1/8/2023): Tiny acute/subacute infarct in posteromedial R temporal periventricular white matter. Areas of encephalomalacia in b/l frontal & L parietal lobes; old trauma vs old infarcts. Old lacunar infarcts in R cerebellar hemisphere & R thalamus. CTA HEAD & NECK (1/7/2023):   1.  L ICA & L VA: chronic occlusion. 2.  R ICA, cervical: patent stent with mild-to-moderate stent stenosis. 3.  R MCA branches: multifocal areas of moderate-to-severe stenosis. 4.  R A2 & A3: multifocal severe stenoses. 5.  L subclavian artery: focal severe stenosis, 2/2 noncalcified atherosclerotic    plaque at the level of the L VA. ECHO (1/9/2023): EF 35-40%. Global hypokinesis, worse in the apex and all apical segments. Definity used to improve delineation of LV endocardial border. LV apical thrombus, 1.4 x 0.6 cm, is noted. Evidence of diastolic dysfunction. Trivial MR      EEG (1/9/2023): Abnormal continuous vEEG recording, the slowing suggests mild-moderate non specific encephalopathy. The presence of L anterior temporal lobe sharps and asymmetry suggests underlying lesion/dysfunction and increases patient risk for focal seizures      LTME (1/9 - 1/11/2023):    Day 1 & 2: During this day of recording no events were recorded. The interictal EEG was abnormal due to diffuse polymorphic theta slowing suggesting moderate encephalopathy. Near continuous L anterior temporal sharp waves conferred an increased risk for focal onset seizures    Day 3: During this day of recording no events were recorded. The interictal EEG was abnormal due to diffuse polymorphic theta slowing suggesting moderate encephalopathy. Previously seen left anterior temporal sharp waves appeared less frequent. These discharges conferred an increased risk for focal onset seizures                  IMPRESSION: 61 y.o.  male admitted with  Metabolic encephalopathy in the setting of DKA & HTN emergency; overnight flash pulmonary edema, requiring Lasix, nitro drip and BiPAP. MRI brain - tiny acute/subacute infarct in R temporal lobe. This infarct is not increasing/causing his encephalopathy. LTME - near continuous L anterior temporal sharp waves, with no electrographic seizures. Pt was alert, able to tell his name; wasn't able to say his brother's name who was at  his bed side. He has been unable to provide any history or orientation answers, intermittent tangential speech with perseveration & anomia; he was trying to mimic today but wasn't able to follow simple commands consistently    Repeat MRI brain was ordered, however MRI tech reported that patient has epicardial leads present from his previous CABG and those might not be MRI safe    Prior Hx multi-infarct state    CTA head/neck - multilevel stenosis. Hx chronic L ICA & L VA occlusion & s/p R ICA stenting (4/2019).  No intervention at this time, as per neuro endovascular team    Acute on chronic ischemic cardiomyopathy with drop of EF; LV thrombus; is on heparin drip    Comorbid conditions - PEA arrest x 2, s/p CABG x 4 (4/2019), R toe osteomyelitis s/p R AKA (2009), HTN, HLD, DM, PAD, prior head trauma, osteolysis              PLAN:  Continue ASA 81 mg QD, Lipitor 80 mg HS    Continue Keppra 500 mg BID PO & Vimpat 100 mg BID PO    Will follow    Please note that this note was generated using a voice recognition dictation software. Although every effort was made to ensure the accuracy of this automated transcription, some errors in transcription may have occurred.

## 2023-01-13 NOTE — PLAN OF CARE
Problem: Discharge Planning  Goal: Discharge to home or other facility with appropriate resources  1/13/2023 0019 by Louise Menchaca RN  Outcome: Progressing  1/12/2023 2012 by Adelina Evans RN  Outcome: Progressing  Flowsheets (Taken 1/12/2023 0800)  Discharge to home or other facility with appropriate resources:   Identify barriers to discharge with patient and caregiver   Identify discharge learning needs (meds, wound care, etc)     Problem: Chronic Conditions and Co-morbidities  Goal: Patient's chronic conditions and co-morbidity symptoms are monitored and maintained or improved  1/13/2023 0019 by Louise Menchaca RN  Outcome: Progressing  1/12/2023 2012 by Adelina Evans RN  Outcome: Progressing  Flowsheets (Taken 1/12/2023 0800)  Care Plan - Patient's Chronic Conditions and Co-Morbidity Symptoms are Monitored and Maintained or Improved:   Monitor and assess patient's chronic conditions and comorbid symptoms for stability, deterioration, or improvement   Collaborate with multidisciplinary team to address chronic and comorbid conditions and prevent exacerbation or deterioration   Update acute care plan with appropriate goals if chronic or comorbid symptoms are exacerbated and prevent overall improvement and discharge     Problem: Confusion  Goal: Confusion, delirium, dementia, or psychosis is improved or at baseline  Description: INTERVENTIONS:  1. Assess for possible contributors to thought disturbance, including medications, impaired vision or hearing, underlying metabolic abnormalities, dehydration, psychiatric diagnoses, and notify attending LIP  2. [place] high risk fall precautions, as indicated  3. Provide frequent short contacts to provide reality reorientation, refocusing and direction  4. Decrease environmental stimuli, including noise as appropriate  5. Monitor and intervene to maintain adequate nutrition, hydration, elimination, sleep and activity  6.  If unable to ensure safety without constant attention obtain sitter and review sitter guidelines with assigned personnel  7. Initiate Psychosocial CNS and Spiritual Care consult, as indicated  1/13/2023 0019 by Nina Chavez RN  Outcome: Progressing  1/12/2023 2012 by Brigido Díaz RN  Outcome: Progressing     Problem: Skin/Tissue Integrity  Goal: Absence of new skin breakdown  Description: 1. Monitor for areas of redness and/or skin breakdown  2. Assess vascular access sites hourly  3. Every 4-6 hours minimum:  Change oxygen saturation probe site  4. Every 4-6 hours:  If on nasal continuous positive airway pressure, respiratory therapy assess nares and determine need for appliance change or resting period.   1/13/2023 0019 by Nina Chavez RN  Outcome: Progressing  1/12/2023 2012 by Brigido Díaz RN  Outcome: Progressing     Problem: Safety - Adult  Goal: Free from fall injury  1/13/2023 0019 by Nina Chavez RN  Outcome: Progressing  1/12/2023 2012 by Brigido Díaz RN  Outcome: Progressing     Problem: ABCDS Injury Assessment  Goal: Absence of physical injury  1/13/2023 0019 by Nina Chavez RN  Outcome: Progressing  1/12/2023 2012 by Brigido Díaz RN  Outcome: Progressing     Problem: Pain  Goal: Verbalizes/displays adequate comfort level or baseline comfort level  1/13/2023 0019 by Nnia Chavez RN  Outcome: Progressing  1/12/2023 2012 by Brigido Díaz RN  Outcome: Progressing  Flowsheets (Taken 1/12/2023 0810)  Verbalizes/displays adequate comfort level or baseline comfort level:   Encourage patient to monitor pain and request assistance   Assess pain using appropriate pain scale   Administer analgesics based on type and severity of pain and evaluate response   Implement non-pharmacological measures as appropriate and evaluate response   Consider cultural and social influences on pain and pain management   Notify Licensed Independent Practitioner if interventions unsuccessful or patient reports new pain

## 2023-01-13 NOTE — PROGRESS NOTES
Comprehensive Nutrition Assessment    Type and Reason for Visit:  Initial (Length of stay)    Nutrition Recommendations/Plan:   Monitor adequacy of PO intake once diet able to be advanced. Monitor need for ONS (suggest high protein pudding if requiring thickened liquids)       Nutrition Assessment:    Currently NPO for cardiac cath today. Previously on minced and moist with mildly thick liquids per MBSS/SLP recommendation. Spoke with RN who reports pt is aphasic and unable to answer writer questions re: previous PO intake. Noted meal intake documentation of % x 1 yesterday. Noted weight loss of ~29 lbs (16% UBW) over past 14 months (not clinically significant for time frame). Nutrition Related Findings:    meds/labs reviewed; hx of R AKA (from 2009) Wound Type: None       Current Nutrition Intake & Therapies:    Average Meal Intake: NPO  Average Supplements Intake: None Ordered  Diet NPO    Anthropometric Measures:  Height: 5' 5\" (165.1 cm)  Ideal Body Weight (IBW): 136 lbs (62 kg)       Current Body Weight: 149 lb 11.1 oz (67.9 kg), 110.1 % IBW. Current BMI (kg/m2): 24.9  Usual Body Weight: 178 lb 12.7 oz (81.1 kg) (11/16/21)  % Weight Change (Calculated): -16.3  Weight Adjustment For: Amputation  Total Adjusted Percentage (Calculated): 10.1  Adjusted Ideal Body Weight (lbs) (Calculated): 122.3 lbs  Adjusted Ideal Body Weight (kg) (Calculated): 55.59 kg  Adjusted % Ideal Body Weight (Calculated): 122.4  Adjusted BMI (kg/m2) (Calculated): 27.4  BMI Categories: Overweight (BMI 25.0-29. 9) (with adjusted BMI for amputation)    Estimated Daily Nutrient Needs:  Energy Requirements Based On: Kcal/kg  Weight Used for Energy Requirements: Current  Energy (kcal/day): 4744-7325 kcals/day  Weight Used for Protein Requirements: Current  Protein (g/day): 75-80 gm/day  Method Used for Fluid Requirements: Other (Comment)  Fluid (ml/day): per MD    Nutrition Diagnosis:   Predicted inadequate energy intake related to (unknown etiology) as evidenced by weight loss (of 16% x 14 months per EHR)    Nutrition Interventions:   Food and/or Nutrient Delivery: Continue NPO (restart diet as appropriate)  Nutrition Education/Counseling: No recommendation at this time  Coordination of Nutrition Care: Continue to monitor while inpatient, Speech Therapy, Swallow Evaluation  Plan of Care discussed with: RN    Goals:  Previous Goal Met:  (goal set)  Goals: Meet at least 75% of estimated needs, prior to discharge       Nutrition Monitoring and Evaluation:   Behavioral-Environmental Outcomes: None Identified  Food/Nutrient Intake Outcomes: Diet Advancement/Tolerance  Physical Signs/Symptoms Outcomes: Weight, Biochemical Data, Nutrition Focused Physical Findings, Chewing or Swallowing    Discharge Planning:     Too soon to determine     Cheryl Kitchen MS, RD, LD  Contact: [phone]

## 2023-01-14 LAB
ABSOLUTE EOS #: 0.24 K/UL (ref 0–0.44)
ABSOLUTE IMMATURE GRANULOCYTE: 0.06 K/UL (ref 0–0.3)
ABSOLUTE LYMPH #: 1.41 K/UL (ref 1.1–3.7)
ABSOLUTE MONO #: 1.17 K/UL (ref 0.1–1.2)
ANION GAP SERPL CALCULATED.3IONS-SCNC: 12 MMOL/L (ref 9–17)
BASOPHILS # BLD: 0 % (ref 0–2)
BASOPHILS ABSOLUTE: 0.05 K/UL (ref 0–0.2)
BILIRUBIN URINE: NEGATIVE
BUN BLDV-MCNC: 11 MG/DL (ref 6–20)
CALCIUM SERPL-MCNC: 8.6 MG/DL (ref 8.6–10.4)
CASTS UA: ABNORMAL /LPF (ref 0–2)
CASTS UA: ABNORMAL /LPF (ref 0–2)
CHLORIDE BLD-SCNC: 99 MMOL/L (ref 98–107)
CO2: 23 MMOL/L (ref 20–31)
COLOR: YELLOW
CREAT SERPL-MCNC: 0.84 MG/DL (ref 0.7–1.2)
EOSINOPHILS RELATIVE PERCENT: 2 % (ref 1–4)
EPITHELIAL CELLS UA: ABNORMAL /HPF (ref 0–5)
GFR SERPL CREATININE-BSD FRML MDRD: >60 ML/MIN/1.73M2
GLUCOSE BLD-MCNC: 182 MG/DL (ref 70–99)
GLUCOSE BLD-MCNC: 188 MG/DL (ref 75–110)
GLUCOSE BLD-MCNC: 190 MG/DL (ref 75–110)
GLUCOSE BLD-MCNC: 246 MG/DL (ref 75–110)
GLUCOSE URINE: NEGATIVE
HCT VFR BLD CALC: 46.8 % (ref 40.7–50.3)
HEMOGLOBIN: 15 G/DL (ref 13–17)
IMMATURE GRANULOCYTES: 0 %
KETONES, URINE: ABNORMAL
LEUKOCYTE ESTERASE, URINE: NEGATIVE
LYMPHOCYTES # BLD: 10 % (ref 24–43)
MCH RBC QN AUTO: 29.2 PG (ref 25.2–33.5)
MCHC RBC AUTO-ENTMCNC: 32.1 G/DL (ref 28.4–34.8)
MCV RBC AUTO: 91.2 FL (ref 82.6–102.9)
MONOCYTES # BLD: 8 % (ref 3–12)
NITRITE, URINE: NEGATIVE
NRBC AUTOMATED: 0 PER 100 WBC
PARTIAL THROMBOPLASTIN TIME: 49 SEC (ref 20.5–30.5)
PARTIAL THROMBOPLASTIN TIME: 74.1 SEC (ref 20.5–30.5)
PARTIAL THROMBOPLASTIN TIME: 78.6 SEC (ref 20.5–30.5)
PDW BLD-RTO: 13.3 % (ref 11.8–14.4)
PH UA: 6 (ref 5–8)
PLATELET # BLD: 275 K/UL (ref 138–453)
PMV BLD AUTO: 10.7 FL (ref 8.1–13.5)
POTASSIUM SERPL-SCNC: 3.9 MMOL/L (ref 3.7–5.3)
PROTEIN UA: ABNORMAL
RBC # BLD: 5.13 M/UL (ref 4.21–5.77)
RBC UA: ABNORMAL /HPF (ref 0–2)
SEG NEUTROPHILS: 80 % (ref 36–65)
SEGMENTED NEUTROPHILS ABSOLUTE COUNT: 11.4 K/UL (ref 1.5–8.1)
SODIUM BLD-SCNC: 134 MMOL/L (ref 135–144)
SPECIFIC GRAVITY UA: 1.02 (ref 1–1.03)
TURBIDITY: CLEAR
URINE HGB: NEGATIVE
UROBILINOGEN, URINE: NORMAL
WBC # BLD: 14.3 K/UL (ref 3.5–11.3)
WBC UA: ABNORMAL /HPF (ref 0–5)
YEAST: ABNORMAL

## 2023-01-14 PROCEDURE — 99232 SBSQ HOSP IP/OBS MODERATE 35: CPT | Performed by: PSYCHIATRY & NEUROLOGY

## 2023-01-14 PROCEDURE — 80048 BASIC METABOLIC PNL TOTAL CA: CPT

## 2023-01-14 PROCEDURE — 6360000002 HC RX W HCPCS: Performed by: STUDENT IN AN ORGANIZED HEALTH CARE EDUCATION/TRAINING PROGRAM

## 2023-01-14 PROCEDURE — 6360000002 HC RX W HCPCS

## 2023-01-14 PROCEDURE — 6370000000 HC RX 637 (ALT 250 FOR IP)

## 2023-01-14 PROCEDURE — 85025 COMPLETE CBC W/AUTO DIFF WBC: CPT

## 2023-01-14 PROCEDURE — C9254 INJECTION, LACOSAMIDE: HCPCS | Performed by: STUDENT IN AN ORGANIZED HEALTH CARE EDUCATION/TRAINING PROGRAM

## 2023-01-14 PROCEDURE — 6370000000 HC RX 637 (ALT 250 FOR IP): Performed by: STUDENT IN AN ORGANIZED HEALTH CARE EDUCATION/TRAINING PROGRAM

## 2023-01-14 PROCEDURE — 6370000000 HC RX 637 (ALT 250 FOR IP): Performed by: INTERNAL MEDICINE

## 2023-01-14 PROCEDURE — 2580000003 HC RX 258: Performed by: STUDENT IN AN ORGANIZED HEALTH CARE EDUCATION/TRAINING PROGRAM

## 2023-01-14 PROCEDURE — 81001 URINALYSIS AUTO W/SCOPE: CPT

## 2023-01-14 PROCEDURE — 94660 CPAP INITIATION&MGMT: CPT

## 2023-01-14 PROCEDURE — 82947 ASSAY GLUCOSE BLOOD QUANT: CPT

## 2023-01-14 PROCEDURE — 2060000000 HC ICU INTERMEDIATE R&B

## 2023-01-14 PROCEDURE — 2500000003 HC RX 250 WO HCPCS: Performed by: STUDENT IN AN ORGANIZED HEALTH CARE EDUCATION/TRAINING PROGRAM

## 2023-01-14 PROCEDURE — 99232 SBSQ HOSP IP/OBS MODERATE 35: CPT | Performed by: FAMILY MEDICINE

## 2023-01-14 PROCEDURE — 36415 COLL VENOUS BLD VENIPUNCTURE: CPT

## 2023-01-14 PROCEDURE — 85730 THROMBOPLASTIN TIME PARTIAL: CPT

## 2023-01-14 PROCEDURE — 87040 BLOOD CULTURE FOR BACTERIA: CPT

## 2023-01-14 PROCEDURE — 2580000003 HC RX 258

## 2023-01-14 RX ORDER — NICOTINE 21 MG/24HR
1 PATCH, TRANSDERMAL 24 HOURS TRANSDERMAL DAILY
Status: DISCONTINUED | OUTPATIENT
Start: 2023-01-14 | End: 2023-01-20

## 2023-01-14 RX ADMIN — CARVEDILOL 12.5 MG: 12.5 TABLET, FILM COATED ORAL at 18:40

## 2023-01-14 RX ADMIN — SODIUM CHLORIDE, PRESERVATIVE FREE 10 ML: 5 INJECTION INTRAVENOUS at 20:27

## 2023-01-14 RX ADMIN — SODIUM CHLORIDE, PRESERVATIVE FREE 20 MG: 5 INJECTION INTRAVENOUS at 10:49

## 2023-01-14 RX ADMIN — LISINOPRIL 20 MG: 20 TABLET ORAL at 10:48

## 2023-01-14 RX ADMIN — LEVETIRACETAM 500 MG: 5 INJECTION INTRAVENOUS at 23:12

## 2023-01-14 RX ADMIN — LACOSAMIDE 100 MG: 10 INJECTION INTRAVENOUS at 00:03

## 2023-01-14 RX ADMIN — SODIUM CHLORIDE, PRESERVATIVE FREE 10 ML: 5 INJECTION INTRAVENOUS at 10:50

## 2023-01-14 RX ADMIN — LACOSAMIDE 100 MG: 10 INJECTION INTRAVENOUS at 13:24

## 2023-01-14 RX ADMIN — HEPARIN SODIUM 26 UNITS/KG/HR: 10000 INJECTION, SOLUTION INTRAVENOUS at 00:40

## 2023-01-14 RX ADMIN — SODIUM CHLORIDE: 9 INJECTION, SOLUTION INTRAVENOUS at 13:24

## 2023-01-14 RX ADMIN — HEPARIN SODIUM 2000 UNITS: 1000 INJECTION INTRAVENOUS; SUBCUTANEOUS at 04:12

## 2023-01-14 RX ADMIN — INSULIN LISPRO 2 UNITS: 100 INJECTION, SOLUTION INTRAVENOUS; SUBCUTANEOUS at 20:08

## 2023-01-14 RX ADMIN — ASPIRIN 81 MG: 81 TABLET, CHEWABLE ORAL at 10:49

## 2023-01-14 RX ADMIN — CARVEDILOL 12.5 MG: 12.5 TABLET, FILM COATED ORAL at 10:49

## 2023-01-14 RX ADMIN — FUROSEMIDE 40 MG: 10 INJECTION, SOLUTION INTRAMUSCULAR; INTRAVENOUS at 18:41

## 2023-01-14 RX ADMIN — FUROSEMIDE 40 MG: 10 INJECTION, SOLUTION INTRAMUSCULAR; INTRAVENOUS at 10:48

## 2023-01-14 RX ADMIN — LACOSAMIDE 100 MG: 10 INJECTION INTRAVENOUS at 21:40

## 2023-01-14 RX ADMIN — SODIUM CHLORIDE, PRESERVATIVE FREE 20 MG: 5 INJECTION INTRAVENOUS at 20:27

## 2023-01-14 RX ADMIN — HEPARIN SODIUM 26 UNITS/KG/HR: 10000 INJECTION, SOLUTION INTRAVENOUS at 15:57

## 2023-01-14 RX ADMIN — LEVETIRACETAM 500 MG: 5 INJECTION INTRAVENOUS at 11:54

## 2023-01-14 RX ADMIN — CEFTRIAXONE SODIUM 1000 MG: 10 INJECTION, POWDER, FOR SOLUTION INTRAVENOUS at 10:49

## 2023-01-14 RX ADMIN — ATORVASTATIN CALCIUM 80 MG: 80 TABLET, FILM COATED ORAL at 20:28

## 2023-01-14 RX ADMIN — Medication 1 TABLET: at 10:48

## 2023-01-14 ASSESSMENT — PAIN SCALES - GENERAL: PAINLEVEL_OUTOF10: 0

## 2023-01-14 NOTE — PROGRESS NOTES
Critical care team - Resident sign-out to medicine service      Date and time: 1/14/2023 12:05 AM  Patient's name:  Kenrick Mckoy Record Number: [id number]  Patient's account/billing number: [de-identified]  Patient's YOB: 1965  Age: 61 y.o. Date of Admission: 1/7/2023  4:22 PM  Length of stay during current admission: 7    Primary Care Physician: Lea Gallego MD    Code Status: Full Code    Mode of physician to physician communication:        [x] Via telephone   [] In person     Date and time of sign-out: 1/14/2023 12:05 AM    Accepting Internal Medicine: Dr. Deana Jean Baptiste    Accepting Medicine team: Intermed    Accepting team's attending: Dr. Yordy Mason    Patient's current ICU Bed:  4822     Patient's assigned bed on floor:  2010        [] Med-Surg Monitored [x] Step-down       [] Psychiatry ICU       [] Psych floor     Reason for ICU admission:     Lisandro Montanez is a 62 yo M with PMHx of PVD, right toe gangrene/osteomyelitis s/p AKA 2009, MV-CAD s/p PEA arrest, s/p CABG in 2019, s/p proximal R ICA stent, ICM with HFrEF 40%, T2DM, complete occlusion of L ICA and L vertebral artery and history of CVA with residual RUE deficits. Patient presented to the ED with altered mental status with last known well two days prior. Patient was found to be hypertensive with -250, tachycardic with HR 110s. Patient appeared disheveled. EKG showed ST elevation in V1-V3 with mild elevation in V4, cardiology was consulted, troponins were downtrending and they recommended heparin infusion. Patient glucose was 620 with beta hydroxy butyrate 2.43 and anion gap 26. Patient was started on insulin gtt. admitted to ICU for hypertensive emergency and DKA.    ICU course summary:      CTA head and neck was significant for chronic occlusion left internal carotid and vertebral arteries, patent right internal carotid artery stent with mild to moderate in-stent stenosis, multifocal areas of moderate to severe stenosis and right middle cerebral artery branches, multifocal severe stenosis in the right anterior cerebral artery and focal severe stenosis in the left subclavian artery. Patient was evaluated by cardiology and neurology and underwent cardiac cath Significant for multivessel CAD, patent grafts and critical stenosis of left subclavian artery with preserved LV systolic function. Cardiology recommended continued medical therapy and vascular consult for subclavian artery stenosis. Patient was found to have a LV thrombus on echo. Along with acute on chronic ischemic cardiomyopathy. Patient was seen by endovascular surgery who recommended no intervention at this time. After neurology evaluation he had MRI brain showing tiny acute infarct in the right temporal lobe which per neurology is not increasing or causing encephalopathy. No electrographic seizures on LTME    Procedures during patient's ICU stay:     Heart cath    Current Vitals:     BP (!) 148/52   Pulse 96   Temp 98.1 °F (36.7 °C) (Oral)   Resp 18   Ht 5' 5\" (1.651 m)   Wt 149 lb 11.1 oz (67.9 kg)   SpO2 94%   BMI 24.91 kg/m²       Cultures:     Blood cultures:                 [] None drawn      [] Negative             []  Positive (Details:  )  Urine Culture:                   [] None drawn      [] Negative             []  Positive (Details:  )  Sputum Culture:               [] None drawn       [] Negative             []  Positive (Details:  )   Endotracheal aspirate:     [] None drawn       [] Negative             []  Positive (Details:  )       Consults:     1.   Cardiology, endovascular, neurology, vascular    Assessment:     Patient Active Problem List    Diagnosis Date Noted    Stenosis of right middle cerebral artery 01/09/2023    At risk of seizures 01/09/2023    Cardiomyopathy, ischemic 01/09/2023    Acute ischemic right MCA stroke ([address] 01/09/2023    Altered mental status 01/07/2023    DKA, type 2, not at goal [facility]) 01/07/2023    Pressure injury of right heel, unstageable ([facility] 12/10/2020    Hx of AKA (above knee amputation), right ([address] 12/10/2020    Erectile dysfunction associated with type 2 diabetes mellitus ([address] 12/10/2020    Above knee amputation of right lower extremity ([address] 09/28/2019    Coronary artery disease 08/23/2019    Ischemic gangrene ([address]     Pyogenic inflammation of bone (HCC)     Status post angiography of extremity 08/20/2019    Leukocytosis     Right-sided extracranial carotid artery stenosis 07/03/2019    Essential hypertension     Type 2 diabetes mellitus with diabetic neuropathy, without long-term current use of insulin (HCC)     Cellulitis of right lower extremity     Moderate malnutrition ([address] 04/26/2019    Acute CVA (cerebrovascular accident) ([facility] 04/25/2019    Thrombocytosis     Right foot drop     Carotid stenosis, bilateral     Cerebral multi-infarct state     Multi infarct state     Hepatitis     Acute metabolic encephalopathy     Abnormal CT of the head     PAD (peripheral artery disease) ([facility] 04/07/2019    Subacute osteomyelitis of right tibia (HCC)          Recommended Follow-up:     Continue Vimpat and Keppra per neurology. Follow-up on neurology plan  Continue medical management of CAD and follow-up on cardiology plan  Follow-up on vascular surgery consult for subclavian stenosis  Continue BiPAP at night  Continue telemetry and continuous pulse ox monitoring        Above mentioned assessment and plan was discussed by me with the admitting medicine resident. The medicine team assigned to the patient by medicine admitting resident will be following up the patient from now onwards on the floor. Irais Valdivia DO, M.D.   Critical care resident  Department of Internal Medicine/ Critical care  [facility])             1/14/2023, 12:05 AM

## 2023-01-14 NOTE — PROGRESS NOTES
[facility]   [address] [place] [address]    Progress Note             Date:   1/14/2023  Patient name:  Kaylah Hansen  Date of admission:  1/7/2023  4:22 PM  MRN:   [id number]  Account:  [de-identified]  YOB: 1965  PCP:    Geraldine Alfonso MD  Room:   2010/2010-01  Code Status:    Full Code    Chief Complaint:     Chief Complaint   Patient presents with    Altered Mental Status       Interval hx: The patient was seen and examined at bedside. Is vitally stable, alert and oriented x 3. No acute events overnight. Patient mentation improved significantly however she seems like it's waxing and waning  No seizures overnight  LTM he was DC'd  On Keppra 500 mg twice daily, Vimpat 100 mg twice daily    Brief History of Present Illness: This is a 26-year-old male past medical history of HTN, DM, HLD, PAD, prior trauma, PEA arrest x 2, s/p CABG x  4 (4/2019), multi-infarct, osteomyelitis who presented on 1/7/2023 for altered mental status, last known well was 2 days prior to presentation, lives alone, able to complete his ADLs. Was found on ground unresponsive unable to speak properly, speech was garbled, disheveled. Initial blood sugars was 250, blood pressure 200/100's and was tachycardic of 110, (to Teena Bones for evaluation. 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 Pt s/p R ICA stenting (4/2019). Had a head CT on 1/8/2023: Slow was unremarkable for acute abnormalities, CTA head and neck showing multi vessel iCAD. MRI brain small acute/subacute infarct of right temporal periventricular white matter, encephalomalacia bilateral frontal and left parietal lobes. Old lacunar infarct in right cerebellum and right thalamus. EEG was done 1/9/2023 showed normal slowing suggesting mild to moderate nonspecific encephalopathy, left anterior temporal lobe sharps and asymmetry suggesting underlying lesion increased risk of focal seizure. LTM E for 2 days continuous showing near continuous left anterior temporal sharp waves. Patient was on Keppra 500 mg twice daily was loaded with Vimpat 200 mg and continued on maintenance dose of 100 mg daily  Echo was completed EF 35-40%    Past Medical History:     Past Medical History:   Diagnosis Date    CAD (coronary artery disease) 04/07/2019    triple bypass    Cardiac arrest ([facility] 04/07/2019    Diabetes mellitus ([facility] 04/2019    ON RX    Hyperlipidemia 04/2019    ON RX HAS NOT HAD SINCE 06/30/2019    Hypertension 04/2019    ON RX NONE SINCE 06/2019    Osteolysis 04/07/2019    R Knee hardware    PEA (Pulseless electrical activity) ([facility] 04/07/2019    PVD (peripheral vascular disease) [facility] 04/07/2019    Wound, open 09/2019    RIGHT STUMP DRAINING MOD AMT BLOODY DRAINAGE        Past Surgical History:     Past Surgical History:   Procedure Laterality Date    ABOVE KNEE AMPUTATION Right 09/28/2019    AMPUTATION ABOVE KNEE Right 09/28/2019    RIGHT ABOVE KNEE AMPUTATION performed by Elvis Christensen MD at [address] [place] [address]  04/07/2019    MVD-Stv    CAROTID STENT PLACEMENT  07/03/2019    ACCULINK CAROTID STENT PLACED, MRI CONDITIONAL 5, 3T OK.      CORONARY ARTERY BYPASS GRAFT  04/07/2019    x4 per Dr. Cathy Hunter, [address] GRAFT N/A 04/07/2019    EMERGENT CPR,  CORONARY ARTERY BYPASS X4, ON PUMP ,CHEST LEFT OPEN; SWAN, ANGELA PER ANESTHESIA performed by Eleni Briseno MD at [facility]      unsure if correct known tibial surgery    HARDWARE REMOVAL Right 04/07/2019    R Knee internal screw protruding through skin    HARDWARE REMOVAL Right 04/22/2019    HARDWARE REMOVAL RIGHT TIBIA,    INSERT / REPLACE / REMOVE PACEMAKER  01/13/2023    Epicardial leads present from CABG - abandoned leads - NOT MRI Safe - NO MRI - KMM (MRI Tech)    LEG AMPUTATION BELOW KNEE Right 08/25/2019    RIGHT FOOT GUILLOTINE AMPUTATION performed by Isidro Silva MD at [address] Right 08/27/2019    RIGHT BELOW KNEE AMPUTATION FORMALIZATION performed by Isidro Silva MD at [address] N/A 04/08/2019    STERNAL  WOUND WASHOUT AND CLOSURE S/P CABG performed by Nandini Llanes MD at [facility] N/A 04/22/2019    HARDWARE REMOVAL RIGHT TIBIA, IRRIGATION AND DEBRIDEMENT [address] [facility] performed by Niels Lundy MD at [facility] Right 2000    [address] off roof, external fixation and internal hardware    TONSILLECTOMY  1974        Medications Prior to Admission:     Prior to Admission medications    Medication Sig Start Date End Date Taking? Authorizing Provider   metoprolol tartrate (LOPRESSOR) 25 MG tablet Take 2 tablets by mouth 2 times daily 8/24/22   Mary Jo Florez MD   clopidogrel (PLAVIX) 75 MG tablet TAKE 1 TABLET BY MOUTH DAILY 2/17/22   Benigno Can MD   gabapentin (NEURONTIN) 300 MG capsule TAKE 1 CAPSULE BY MOUTH EVERY 8 HOURS 2/17/22 3/19/22  Benigno Can MD   metFORMIN (GLUCOPHAGE) 1000 MG tablet Take 1 tablet by mouth 2 times daily (with meals) 1/13/22 3/14/22  Benigno Can MD   insulin lispro, 1 Unit Dial, (HUMALOG KWIKPEN) 100 UNIT/ML SOPN INJECT SUBCUTANEOUSLY BEFORE MEALS AS DIRECTED PER SLIDING SCALE: SEE ATTACHED FOR SLIDING SCALE DIRECTIONS: Hold all insulin and notify Dr immediately if BG <60. -200: 2 UNITS; -250: 4UNITS; 251-300 6 UNITS; 301-350: 8 UNITS  Patient not taking: Reported on 1/13/2022 1/5/22   Benigno Can MD   lisinopril-hydroCHLOROthiazide (PRINZIDE;ZESTORETIC) 20-25 MG per tablet Take 2 tablets by mouth daily .   Take 1 pill if blood pressure is less than 130/80 12/23/21   Benigno Can MD   insulin glargine (LANTUS SOLOSTAR) 100 UNIT/ML injection pen Inject 24 Units into the skin nightly 11/9/21 1/13/22  Justo Hunt MD   Insulin Syringe-Needle U-100 30G X 5/16\" 1 ML MISC 1 each by Does not apply route daily 11/9/21   Massimo Senior MD   atorvastatin (LIPITOR) 40 MG tablet Take 1 tablet by mouth nightly 10/19/21   Dora Pretty MD   metoprolol tartrate (LOPRESSOR) 50 MG tablet Take 1 tablet by mouth 2 times daily 10/19/21   Dora Pretty MD   blood glucose test strips (ONETOUCH VERIO) strip TEST 2 TIMES A DAY & AS NEEDED FOR SYMPTOMS OF IRREGULAR BLOOD GLUCOSE. 5/28/21   Teddie Schirmer, MD   Alcohol Swabs (B-D SINGLE USE SWABS REGULAR) PADS USE AS DIRECTED WITH TESTING SUPPLIES 5/28/21   Teddie Schirmer, MD   Lancets ([address]) [address] USE AS DIRECTED TO TEST BLOOD SUGAR 5/28/21   Teddie Schirmer, MD   sildenafil (VIAGRA) 100 MG tablet Take 1 tablet by mouth as needed for Erectile Dysfunction 1/7/21   Teddie Schirmer, MD   Blood Glucose Monitoring Suppl (ONE TOUCH ULTRA 2) w/Device KIT U UTD ONCE D 9/27/19   Historical Provider, MD   acetaminophen (TYLENOL) 500 MG tablet Take 2 tablets by mouth every 8 hours as needed for Pain 9/30/19   Richard Richardson DO   Handicap Placard [address] [facility] by Does not apply route For 5 years 9/27/19   Teddie Schirmer, MD   Blood Glucose Monitoring Suppl (ACURA BLOOD GLUCOSE METER) w/Device KIT 1 kit by Does not apply route once for 1 dose 9/27/19 9/27/19  Teddie Schirmer, MD   Insulin Pen Needle 32G X 6 MM MISC 1 each by Does not apply route daily 9/27/19   Teddie Schirmer, MD   furosemide (LASIX) 20 MG tablet Take 1 tablet by mouth daily  Patient not taking: Reported on 11/16/2021 7/4/19   ISAAC Lazo - CNP   aspirin 81 MG chewable tablet Take 1 tablet by mouth daily 5/10/19   Kiah Diaz MD   Multiple Vitamins-Minerals (THERAPEUTIC MULTIVITAMIN-MINERALS) tablet Take 1 tablet by mouth daily (with breakfast) 5/10/19   Kiah Diaz MD        Allergies:     Patient has no known allergies. Social History:     Tobacco:    reports that he has been smoking cigarettes. He started smoking about 2 years ago.  He has a 20.00 pack-year smoking history. He has never used smokeless tobacco.  Alcohol:      reports no history of alcohol use. Drug Use:  reports no history of drug use.     Family History:     Family History   Problem Relation Age of Onset    Heart Disease Mother         CAD WITH STENT    Diabetes Mother     Stroke Mother     Diabetes Father     Stroke Father     Heart Disease Father         CAD    High Blood Pressure Father     High Blood Pressure Sister     Diabetes Brother     Heart Disease Brother     High Blood Pressure Brother        Review of Systems:     Review of Systems    Physical Exam:   BP (!) 158/64   Pulse 85   Temp 98.1 °F (36.7 °C) (Oral)   Resp 16   Ht 5' 5\" (1.651 m)   Wt 149 lb 11.1 oz (67.9 kg)   SpO2 90%   BMI 24.91 kg/m²   Temp (24hrs), Av °F (36.7 °C), Min:97.8 °F (36.6 °C), Max:98.2 °F (36.8 °C)    Recent Labs     23  0748 23  1252 23  0107   POCGLU 169* 249* 186* 188*       Intake/Output Summary (Last 24 hours) at 2023 1308  Last data filed at 2023 0441  Gross per 24 hour   Intake 848.34 ml   Output 1500 ml   Net -651.66 ml         Neurologic Exam     GENERAL  Appears comfortable and in no distress   HEENT  NC/ AT   HEART  S1 and S2 heard; palpation of pulses: radial pulse    NECK  Supple and no bruits heard   MENTAL STATUS:  Alert, oriented to time and place and person, intact memory, no confusion, normal speech, normal language, no hallucination or delusion   CRANIAL NERVES: II     -      Visual fields intact to confrontation  III,IV,VI -  PERR, EOMs full, no ptosis  V     -     Normal facial sensation   VII    -     Normal facial symmetry  VIII   -     Intact hearing   IX,X -     Symmetrical palate  XI    -     Symmetrical shoulder shrug  XII   -     Midline tongue, no atrophy    MOTOR FUNCTION: RUE: Significant for good strength of grade 4+/5 in proximal and distal muscle groups   LUE: Significant for good strength of grade 5/5 in proximal and distal muscle groups   RLE: Amputated  LLE: Significant for good strength of grade 5/5 in proximal and distal muscle groups      Normal bulk, normal tone and no involuntary movements, no tremor   SENSORY FUNCTION:  Normal touch, normal pinprick, normal vibration, normal proprioception   CEREBELLAR FUNCTION:  Intact fine motor control over upper limbs and lower limbs   REFLEX FUNCTION:  Symmetric in upper and lower extremities, no Babinski sign   STATION and GAIT  Normal gait and tandem station, normal tip toes and heel walking       Investigations:      Laboratory Testing:  Recent Results (from the past 24 hour(s))   POC Glucose Fingerstick    Collection Time: 01/13/23  8:10 PM   Result Value Ref Range    POC Glucose 186 (H) 75 - 110 mg/dL   POC Glucose Fingerstick    Collection Time: 01/14/23  1:07 AM   Result Value Ref Range    POC Glucose 188 (H) 75 - 110 mg/dL   CBC with Auto Differential    Collection Time: 01/14/23  2:12 AM   Result Value Ref Range    WBC 14.3 (H) 3.5 - 11.3 k/uL    RBC 5.13 4.21 - 5.77 m/uL    Hemoglobin 15.0 13.0 - 17.0 g/dL    Hematocrit 46.8 40.7 - 50.3 %    MCV 91.2 82.6 - 102.9 fL    MCH 29.2 25.2 - 33.5 pg    MCHC 32.1 28.4 - 34.8 g/dL    RDW 13.3 11.8 - 14.4 %    Platelets 387 776 - 704 k/uL    MPV 10.7 8.1 - 13.5 fL    NRBC Automated 0.0 0.0 per 100 WBC    Seg Neutrophils 80 (H) 36 - 65 %    Lymphocytes 10 (L) 24 - 43 %    Monocytes 8 3 - 12 %    Eosinophils % 2 1 - 4 %    Basophils 0 0 - 2 %    Immature Granulocytes 0 0 %    Segs Absolute 11.40 (H) 1.50 - 8.10 k/uL    Absolute Lymph # 1.41 1.10 - 3.70 k/uL    Absolute Mono # 1.17 0.10 - 1.20 k/uL    Absolute Eos # 0.24 0.00 - 0.44 k/uL    Basophils Absolute 0.05 0.00 - 0.20 k/uL    Absolute Immature Granulocyte 0.06 0.00 - 0.30 k/uL   Basic Metabolic Panel    Collection Time: 01/14/23  2:12 AM   Result Value Ref Range    Glucose 182 (H) 70 - 99 mg/dL    BUN 11 6 - 20 mg/dL    Creatinine 0.84 0.70 - 1.20 mg/dL    Est, Glom Filt Rate >60 >60 mL/min/1.73m2    Calcium 8.6 8.6 - 10.4 mg/dL    Sodium 134 (L) 135 - 144 mmol/L    Potassium 3.9 3.7 - 5.3 mmol/L    Chloride 99 98 - 107 mmol/L    CO2 23 20 - 31 mmol/L    Anion Gap 12 9 - 17 mmol/L   APTT    Collection Time: 01/14/23  2:12 AM   Result Value Ref Range    PTT 49.0 (H) 20.5 - 30.5 sec   APTT    Collection Time: 01/14/23 10:03 AM   Result Value Ref Range    PTT 78.6 (H) 20.5 - 30.5 sec   Culture, Blood 1    Collection Time: 01/14/23 10:48 AM    Specimen: Blood   Result Value Ref Range    Specimen Description . BLOOD     Special Requests RT HAND 10ML     Culture NO GROWTH <24 HRS    Culture, Blood 1    Collection Time: 01/14/23 10:53 AM    Specimen: Blood   Result Value Ref Range    Specimen Description . BLOOD     Special Requests RT ARM 10ML     Culture NO GROWTH <24 HRS      Recent Labs     01/14/23  0212   WBC 14.3*   RBC 5.13   HGB 15.0   HCT 46.8   MCV 91.2   MCH 29.2   MCHC 32.1   RDW 13.3      MPV 10.7     Recent Labs     01/14/23  0212   *   K 3.9   CL 99   CO2 23   BUN 11   CREATININE 0.84   GLUCOSE 182*   CALCIUM 8.6     Hemoglobin A1C   Date Value Ref Range Status   01/09/2023 12.7 (H) 4.0 - 6.0 % Final       Assessment :      Primary Problem  Altered mental status    Active Hospital Problems    Diagnosis Date Noted    Stenosis of right middle cerebral artery [I66.01] 01/09/2023     Priority: Medium    At risk of seizures [Z91.89] 01/09/2023     Priority: Medium    Cardiomyopathy, ischemic [I25.5] 01/09/2023     Priority: Medium    Acute ischemic right MCA stroke [facility]) [I63.511] 01/09/2023     Priority: Medium    Altered mental status [R41.82] 01/07/2023     Priority: Medium    DKA, type 2, not at goal [facility]) [E11.10] 01/07/2023     Priority: Medium    Cerebral multi-infarct state [I69.30]     Acute metabolic encephalopathy [H32.74]        Past medical history of HTN, PVD, right AKA 2019, DM, CAD s/p CABG, right ICA s/p stenting, ischemic gangrene, presented after altered mental status, was found on the floor in feces. CT head with no acute abnormalities, showing encephalopathy bifrontal areas. CTA head and neck showing multiple iCAD disease. Elevated troponin is on heparin for NSTEMI. MRI brain showing subacute infarct of the right temporal white matter along with encephalomalacia of bilateral frontal and parietal lobes. EEG showing nonspecific encephalopathy and left temporal LPD's. Impression: Metabolic encephalopathy in the setting of DKA and HTN, ischemic stroke, possible seizure as a sequelae of old stroke, dysphagia, hyperglycemia provoked seizures     Plan: On aspirin 81 mg   Lipitor 80 mg  CTA head and neck stable from 2019  Keppra 500 mg twice daily  Vimpat 100 mg twice daily  Is on heparin for left ventricular thrombus  Vascular following for PVD, subclavian stenosis  Will sign off      Follow-up further recommendations after discussing the case with attending  The plan was discussed with the patient, patient's family and the medical staff. Consultations:   IP CONSULT TO CARDIOLOGY  IP CONSULT TO NEUROLOGY  IP CONSULT TO HOSPITALIST  IP CONSULT TO FAMILY MEDICINE  IP CONSULT TO ENDOVASCULAR NEUROSURGERY  IP CONSULT TO CRITICAL CARE  IP CONSULT TO DIABETES EDUCATOR  IP CONSULT TO VASCULAR SURGERY    Patient is admitted as inpatient status because of co-morbidities listed above, severity of signs and symptoms as outlined, requirement for current medical therapies and most importantly because of direct risk to patient if care not provided in a hospital setting.     Farhat Parks MD  Neurology Resident PGY-2   1/14/2023  1:08 PM    Copy sent to Dr. Debby Braga MD

## 2023-01-14 NOTE — PROGRESS NOTES
Endovascular Neurosurgery Progress Note    SUBJECTIVE:   Patient was sleepy this am, didn't tell me his name    Review of Systems:  CONSTITUTIONAL:  negative for fevers, chills, fatigue and malaise    EYES:  negative for double vision, blurred vision and photophobia     HEENT:  negative for tinnitus, epistaxis and sore throat    RESPIRATORY:  negative for cough, shortness of breath, wheezing    CARDIOVASCULAR:  negative for chest pain, palpitations, syncope, edema    GASTROINTESTINAL:  negative for nausea, vomiting    GENITOURINARY:  negative for incontinence    MUSCULOSKELETAL:  negative for neck or back pain    NEUROLOGICAL:  Negative for weakness and tingling  negative for headaches and dizziness    PSYCHIATRIC:  negative for anxiety      Review of systems otherwise negative.    OBJECTIVE:     Vitals:    23 0257   BP: (!) 148/72   Pulse: 100   Resp: 20   Temp: 98.2 °F (36.8 °C)   SpO2: 94%        General:  Gen: normal habitus, NAD  HEENT: NCAT, mucosa moist  Cvs: RRR, S1 S2 normal  Resp: symmetric unlabored breathing  Abd: s/nd/nt  Ext: no edema  Skin: no lesions seen, warm and dry    Neuro:  Gen: somnolence, on precedex drip  Lang/speech: somonolence  CN: PERRL, EOMI, VFF, V1-3 intact, mild right face droop, hearing intact  Motor: grossly 5/5 UE and LE on the left, right arm 4/5, right leg amputated above knee 1/5  Sense: decrease on the left  Coord: deferred  DTR: deferred  Gait: deferred    NIH Stroke Scale:   1a  Level of consciousness: 0   1b. LOC questions:  0 - answers both questions correctly   1c. LOC commands: 0 - performs both tasks correctly   2.  Best Gaze: 0 - normal   3. Visual: 0 - no visual loss   4. Facial Palsy: 1 - minor paralysis (flattened nasolabial fold, asymmetric on smiling)   5a. Motor left arm: 0 - no drift, limb holds 90 (or 45) degrees for full 10 seconds   5b.  Motor right arm: 1   6a. Motor left le - no drift; leg holds 30 degree position for full 5 seconds   6b   Motor right leg:  3 - no effort against gravity; leg falls to bed immediately (pt has R AKA)   7. Limb Ataxia: 0 - absent   8. Sensory: 1 - mild to moderate sensory loss; patient feels pinprick is less sharp or is dull on the affected side; there is a loss of superficial pain with pinprick but patient is aware of being touched    9. Best Language:  0   10. Dysarthria: 0 - normal   11. Extinction and Inattention: 0 - no abnormality         Total:   6     MRS: 05      LABS:   Reviewed. Lab Results   Component Value Date    HGB 15.0 01/14/2023    WBC 14.3 (H) 01/14/2023     01/14/2023     (L) 01/14/2023    BUN 11 01/14/2023    CREATININE 0.84 01/14/2023    AST 16 01/07/2023    ALT 18 01/07/2023    MG 1.5 (L) 01/12/2023    APTT 49.0 (H) 01/14/2023    INR 1.1 08/23/2022      No results found for: COVID19    RADIOLOGY:   Images were personally reviewed including:   Cerebral angiogram 7/3/2019:  1. Right proximal immediately after the origin cervical ICA atherosclerotic changes resulting into a focal area of stenosis measuring approximately 65% per NASCET criteria. 2.  The above mentioned stenosis was treated with post balloon angioplasty and stenting using a 7-10x 30 mm Acculink stent with no significant residual stenosis. 3.  Right common femoral artery severe stenosis with good collaterals. CTA head neck 1/7/23: R ICA Acculink stent patent and L ICA chronic occlusion seen before 2019. Dominant R VA and chronically occluded L VA       MRI brain w/o contrast 1/8/23: punctate right temporal periventricular stroke. 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  Patient presented to the ED with AMS and found to have glucose of 620 and possible sepsis. Endovascular consulted for chronic L ICA and L VA occlusion and hx of R ICA stent in 2019. Neuro exam was stable  PLAN:   - no intervention, CTA is stable and L ICA and L VA occlusion are chronic and known before 2019  - R ICA stent is patent  - patient has a punctate right temporal periventricular stroke, he is currently already on aspirin, recommend continue it  - patient is on heparin for ACS, cardiology and ICU managing it  - ICU management   - PT OT and SLP  - F/up with Neuro endovascular clinic in 6 months        Case discussed with Dr. Odilon Cerdatingham attending.     Sherryle Howell, MD  Stroke, [facility]  [id number]  Electronically signed 1/14/2023 at 7:43 AM

## 2023-01-14 NOTE — PROGRESS NOTES
Writer attempted to notify pt's son who is Primary decision maker of transfer to room 2010. Brother (Jeferson Ambler) notified of transfer to room 2010, Jeferson Betts will notify son.

## 2023-01-14 NOTE — PLAN OF CARE
Problem: Discharge Planning  Goal: Discharge to home or other facility with appropriate resources  Outcome: Progressing     Problem: Chronic Conditions and Co-morbidities  Goal: Patient's chronic conditions and co-morbidity symptoms are monitored and maintained or improved  Outcome: Progressing     Problem: Confusion  Goal: Confusion, delirium, dementia, or psychosis is improved or at baseline  Description: INTERVENTIONS:  1. Assess for possible contributors to thought disturbance, including medications, impaired vision or hearing, underlying metabolic abnormalities, dehydration, psychiatric diagnoses, and notify attending LIP  2. [place] high risk fall precautions, as indicated  3. Provide frequent short contacts to provide reality reorientation, refocusing and direction  4. Decrease environmental stimuli, including noise as appropriate  5. Monitor and intervene to maintain adequate nutrition, hydration, elimination, sleep and activity  6. If unable to ensure safety without constant attention obtain sitter and review sitter guidelines with assigned personnel  7. Initiate Psychosocial CNS and Spiritual Care consult, as indicated  Outcome: Progressing     Problem: Skin/Tissue Integrity  Goal: Absence of new skin breakdown  Description: 1. Monitor for areas of redness and/or skin breakdown  2. Assess vascular access sites hourly  3. Every 4-6 hours minimum:  Change oxygen saturation probe site  4. Every 4-6 hours:  If on nasal continuous positive airway pressure, respiratory therapy assess nares and determine need for appliance change or resting period.   Outcome: Progressing     Problem: Safety - Adult  Goal: Free from fall injury  Outcome: Progressing     Problem: ABCDS Injury Assessment  Goal: Absence of physical injury  Outcome: Progressing     Problem: Pain  Goal: Verbalizes/displays adequate comfort level or baseline comfort level  Outcome: Progressing     Problem: Respiratory - Adult  Goal: Achieves optimal ventilation and oxygenation  Outcome: Progressing

## 2023-01-14 NOTE — CONSULTS
[address]      Patient's Name/ Date of Birth/ Gender: Evans Pool / 1965 (61 y.o.) / male     Referring Physician: Nathen Kirby MD    Consulting Physician:  Dr. Di Talavera    History of present Illness: Pt is a 61 y.o. male seen in consult for subclavian stenosis found on cardiac cath today. He initially presented with altered mental status after being found lethargic by family. CTA head and neck revealed focal severe stenosis L subclavian artery at level of L vertebral artery origin. Patient had elevated troponin and abnormal ekg with concern for NSTEMI. He has hx of CABG 2019. He underwent left heart cath with cardiology today that showed stenosis of left subclavian artery proximal to LIMA origin. Patient is poor historian. Unable to recall any events leading up to being found by family. He denies Left arm pain with daily tasks. He denies dizziness/lightheadedness/chest pain with LUE use. He is s/p R AKA after 9/28/19 after BKA failed to heal. He states he ambulates and sometimes uses a walker. Past Medical History:  has a past medical history of CAD (coronary artery disease), Cardiac arrest ([address] Diabetes mellitus ([address] Hyperlipidemia, Hypertension, Osteolysis, PEA (Pulseless electrical activity) ([address] PVD (peripheral vascular disease) ([address] and Wound, open. Past Surgical History:   Past Surgical History:   Procedure Laterality Date    ABOVE KNEE AMPUTATION Right 09/28/2019    AMPUTATION ABOVE KNEE Right 09/28/2019    RIGHT ABOVE KNEE AMPUTATION performed by Cailin Hodges MD at [address] [place] [address]  04/07/2019    MVD-Stv    CAROTID STENT PLACEMENT  07/03/2019    ACCULINK CAROTID STENT PLACED, MRI CONDITIONAL 5, 3T OK.      CORONARY ARTERY BYPASS GRAFT  04/07/2019    x4 per Dr. Andrés Garcias, [address] GRAFT N/A 04/07/2019    EMERGENT CPR,  CORONARY ARTERY BYPASS X4, ON PUMP ,CHEST LEFT OPEN; ANETTE, ANGELA PER ANESTHESIA performed by Beatrice Matias MD at [facility]      unsure if correct known tibial surgery    HARDWARE REMOVAL Right 04/07/2019    R Knee internal screw protruding through skin    HARDWARE REMOVAL Right 04/22/2019    HARDWARE REMOVAL RIGHT TIBIA,    INSERT / REPLACE / REMOVE PACEMAKER  01/13/2023    Epicardial leads present from CABG - abandoned leads - NOT MRI Safe - NO MRI - Seth Fonseca (MRI Tech)    LEG AMPUTATION BELOW KNEE Right 08/25/2019    RIGHT FOOT GUILLOTINE AMPUTATION performed by Cristian Wilson MD at [place] [address] Right 08/27/2019    RIGHT BELOW KNEE AMPUTATION FORMALIZATION performed by Cristian Wilson MD at [address] [facility] N/A 04/08/2019    STERNAL  WOUND WASHOUT AND CLOSURE S/P CABG performed by Beatrice Matias MD at [address] [facility] N/A 04/22/2019    HARDWARE REMOVAL RIGHT TIBIA, IRRIGATION AND DEBRIDEMENT [address] performed by Jonathan Abreu MD at [facility] Right 2000    Wilma Speak off roof, external fixation and internal hardware    TONSILLECTOMY  1974       Social History:  reports that he has been smoking cigarettes. He started smoking about 2 years ago. He has a 20.00 pack-year smoking history. He has never used smokeless tobacco. He reports that he does not drink alcohol and does not use drugs. Family History: family history includes Diabetes in his brother, father, and mother; Heart Disease in his brother, father, and mother; High Blood Pressure in his brother, father, and sister; Stroke in his father and mother.     Review of Systems:   General: Denies fever, chills, night sweats  HEENT: Denies sore throat, sinus problems  Card: Denies chest pain, palpitations  Pulm: Denies cough, shortness of breath  GI: denies history of constipation, diarrhea, hematochezia or melena  : Denies polyuria, dysuria, hematuria  Endo: Denies diabetes, thyroid problems. Heme: Denies anemia, h/o bleeding or clotting problems. Neuro: Denies h/o CVA, TIA  Skin: Denies rashes, ulcers  Musculoskeletal: Denies muscle, joint, back pain. Allergies: Patient has no known allergies.     Current Meds:  Current Facility-Administered Medications:     atorvastatin (LIPITOR) tablet 80 mg, 80 mg, Oral, Nightly, Laura Diaz MD, 80 mg at 01/13/23 2041    lacosamide (VIMPAT) tablet 100 mg, 100 mg, Oral, BID, ISAAC Saleh - CNP    levETIRAcetam (KEPPRA) tablet 500 mg, 500 mg, Oral, BID, ISAAC Saleh - CNP    carvedilol (COREG) tablet 12.5 mg, 12.5 mg, Oral, BID WC, Oswadlo Herrera DO, 12.5 mg at 01/13/23 1752    lisinopril (PRINIVIL;ZESTRIL) tablet 20 mg, 20 mg, Oral, Daily, Oswaldo Herrera DO, 20 mg at 01/13/23 0912    furosemide (LASIX) injection 40 mg, 40 mg, IntraVENous, BID, Marquita Hood MD, 40 mg at 01/13/23 1752    nitroGLYCERIN 50 mg in dextrose 5% 250 mL infusion, 5-200 mcg/min, IntraVENous, Continuous, Marquita Hood MD, Last Rate: 6 mL/hr at 01/12/23 1600, 20 mcg/min at 01/12/23 1600    insulin lispro (HUMALOG) injection vial 0-8 Units, 0-8 Units, SubCUTAneous, Q6H, Patricia Kimble DO, 2 Units at 01/13/23 1254    bisacodyl (DULCOLAX) EC tablet 5 mg, 5 mg, Oral, Daily PRN, Tommy Romano MD    heparin 25,000 units in dextrose 5 % 250 mL infusion (rate based), 5-30 Units/kg/hr, IntraVENous, Continuous, Jessy Ledesma MD, Last Rate: 17.7 mL/hr at 01/13/23 2024, 26 Units/kg/hr at 01/13/23 2024    dexmedetomidine (PRECEDEX) 400 mcg in sodium chloride 0.9 % 100 mL infusion, 0.1-1.5 mcg/kg/hr, IntraVENous, Continuous, Jessy Ledesma MD, Last Rate: 10 mL/hr at 01/12/23 1600, 0.589 mcg/kg/hr at 01/12/23 1600    glucose chewable tablet 16 g, 4 tablet, Oral, PRN, Marquita Hood MD    dextrose bolus 10% 125 mL, 125 mL, IntraVENous, PRN **OR** dextrose bolus 10% 250 mL, 250 mL, IntraVENous, PRN, Janelle Wall Ivan Fleming MD    glucagon (rDNA) injection 1 mg, 1 mg, SubCUTAneous, PRN, Kevin Cerna MD    dextrose 10 % infusion, , IntraVENous, Continuous PRN, Kevin Cerna MD    famotidine (PEPCID) 20 mg in sodium chloride (PF) 0.9 % 10 mL injection, 20 mg, IntraVENous, BID, Kevin Cerna MD, 20 mg at 01/13/23 2041    cefTRIAXone (ROCEPHIN) 1,000 mg in sterile water 10 mL IV syringe, 1,000 mg, IntraVENous, Q24H, Kevin Cerna MD, 1,000 mg at 01/13/23 0759    heparin (porcine) injection 4,000 Units, 4,000 Units, IntraVENous, PRN, Letty Brewster MD, 4,000 Units at 01/10/23 1538    heparin (porcine) injection 2,000 Units, 2,000 Units, IntraVENous, PRN, Letty Brewster MD, 2,000 Units at 01/12/23 1204    aspirin chewable tablet 81 mg, 81 mg, Oral, Daily, Letty Brewster MD, 81 mg at 01/13/23 0759    therapeutic multivitamin-minerals 1 tablet, 1 tablet, Oral, Daily with breakfast, Letty Brewster MD, 1 tablet at 01/13/23 0912    dextrose bolus 10% 125 mL, 125 mL, IntraVENous, PRN, Stopped at 01/10/23 1139 **OR** dextrose bolus 10% 250 mL, 250 mL, IntraVENous, PRN, Letty Brewster MD    potassium chloride 10 mEq/100 mL IVPB (Peripheral Line), 10 mEq, IntraVENous, PRN, Letty Brewster MD, Last Rate: 100 mL/hr at 01/13/23 1013, 10 mEq at 01/13/23 1013    magnesium sulfate 1000 mg in dextrose 5% 100 mL IVPB, 1,000 mg, IntraVENous, PRN, Letty Brewster MD, Stopped at 01/12/23 1752    sodium phosphate 10 mmol in sodium chloride 0.9 % 250 mL IVPB, 10 mmol, IntraVENous, PRN, Stopped at 01/10/23 1327 **OR** sodium phosphate 15 mmol in dextrose 5 % 250 mL IVPB, 15 mmol, IntraVENous, PRN, Stopped at 01/08/23 1315 **OR** sodium phosphate 20 mmol in dextrose 5 % 500 mL IVPB, 20 mmol, IntraVENous, PRN, Letty Brewster MD    sodium chloride flush 0.9 % injection 5-40 mL, 5-40 mL, IntraVENous, 2 times per day, Letty Brewster MD, 10 mL at 01/13/23 0809    sodium chloride flush 0.9 % injection 5-40 mL, 5-40 mL, IntraVENous, PRN, Kenrick Link MD    0.9 % sodium chloride infusion, , IntraVENous, PRN, Kenrick Link MD, Last Rate: 10 mL/hr at 01/12/23 2104, New Bag at 01/12/23 2104    ondansetron (ZOFRAN-ODT) disintegrating tablet 4 mg, 4 mg, Oral, Q8H PRN **OR** ondansetron (ZOFRAN) injection 4 mg, 4 mg, IntraVENous, Q6H PRN, Kenrick Link MD    polyethylene glycol (GLYCOLAX) packet 17 g, 17 g, Oral, Daily PRN, Kenrick Link MD    acetaminophen (TYLENOL) tablet 650 mg, 650 mg, Oral, Q6H PRN **OR** acetaminophen (TYLENOL) suppository 650 mg, 650 mg, Rectal, Q6H PRN, Kenrick Link MD    Vital Signs:  Vitals:    01/13/23 1800   BP: (!) 158/82   Pulse: 89   Resp: 16   Temp:    SpO2: 94%       Physical Exam:  Vital signs and Nurse's note reviewed  Gen:  Alert to self, no acute distress  HEENT: PERRLA, EOMI, no scleral icterus, oral mucosa moist  Neck: Supple  Chest: Symmetric rise with inhalation, no evidence of trauma  CVS: Regular rate and rhythm, no murmurs, no rubs or gallops  Resp: Good bilateral air entry, clear to auscultation b/l, no wheeze or rhonchi  Abd: soft, non-tender, non-distended, bowel sounds present. Ext:s/p  R AKA well healed, no open wounds.  L DP/PT signal  CNS: Moves all extremities, no gross focal motor deficits  Skin: No erythema or ulcerations   Pulses: R> L radial signal, R palpable    Labs:   Lab Results   Component Value Date/Time    WBC 12.5 01/13/2023 06:14 AM    HGB 14.4 01/13/2023 06:14 AM    HCT 45.2 01/13/2023 06:14 AM    MCV 93.2 01/13/2023 06:14 AM     01/13/2023 06:14 AM     Lab Results   Component Value Date/Time     01/13/2023 06:14 AM    K 3.6 01/13/2023 06:14 AM     01/13/2023 06:14 AM    CO2 22 01/13/2023 06:14 AM    BUN 10 01/13/2023 06:14 AM    CREATININE 0.79 01/13/2023 06:14 AM    GLUCOSE 165 01/13/2023 06:14 AM    CALCIUM 8.5 01/13/2023 06:14 AM     Lab Results   Component Value Date/Time    INR 1.1 08/23/2022 11:39 PM       Imaging:  XR ABDOMEN (KUB) (SINGLE AP VIEW)    Result Date: 1/8/2023  No radiopaque foreign body.     CT HEAD WO CONTRAST    Result Date: 1/8/2023  No acute intracranial abnormality.  Motion artifact limits evaluation of the lower cervical spine but no acute abnormality seen.     CT CERVICAL SPINE WO CONTRAST    Result Date: 1/8/2023  No acute intracranial abnormality.  Motion artifact limits evaluation of the lower cervical spine but no acute abnormality seen.     XR CHEST PORTABLE    Result Date: 1/12/2023  Mildly improved diffuse hazy densities in the lungs.     XR CHEST PORTABLE    Result Date: 1/11/2023  Mild cardiomegaly with diffuse pulmonary infiltrates.     XR CHEST PORTABLE    Result Date: 1/10/2023  Diffuse bilateral pulmonary opacities, concerning for edema or pneumonia.     XR CHEST PORTABLE    Result Date: 1/7/2023  No acute abnormality.     CTA HEAD NECK W CONTRAST    Result Date: 1/7/2023  1.  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 2.  Patent right cervical internal carotid artery stent demonstrates mild-to-moderate in stent stenosis.  Follow-up catheter angiogram may be helpful for further evaluation. 3.  Multifocal areas of moderate to severe stenosis in the right middle cerebral artery branches. 4.  Multifocal severe stenoses in the right anterior cerebral artery A2 and A3 segments. 5.  Focal severe stenosis in the left subclavian artery secondary to noncalcified atherosclerotic plaque at the level of the left vertebral artery origin.     MRI BRAIN WO CONTRAST    Result Date: 1/8/2023  Tiny acute to subacute infarction in the posteromedial right temporal periventricular white matter. Areas of encephalomalacia in the bilateral frontal and left parietal lobes, likely related to old trauma or old infarctions. Old lacunar infarcts in the right cerebellar hemisphere and the right thalamus. Asymmetrically decreased normal flow void in the left internal carotid artery, likely related to occlusion.     FL  MODIFIED BARIUM SWALLOW W VIDEO    Result Date: 1/9/2023  1. Penetration followed by aspiration and a latent cough with the thin liquid substance by cup. 2. Trace penetration with the thin liquid substance by teaspoon without aspiration. 3. No penetration or aspiration with the remainder of the administered substances. Please see separate speech pathology report for full discussion of findings and recommendations. Impression:  Patient Active Problem List   Diagnosis    PAD (peripheral artery disease) ([facility])    Subacute osteomyelitis of right tibia (HCC)    Acute metabolic encephalopathy    Abnormal CT of the head    Hepatitis    Cerebral multi-infarct state    Multi infarct state    Carotid stenosis, bilateral    Right foot drop    Thrombocytosis    Acute CVA (cerebrovascular accident) ([address]    Moderate malnutrition ([address]    Cellulitis of right lower extremity    Right-sided extracranial carotid artery stenosis    Essential hypertension    Type 2 diabetes mellitus with diabetic neuropathy, without long-term current use of insulin ([facility])    Leukocytosis    Status post angiography of extremity    Ischemic gangrene ([address]    Pyogenic inflammation of bone ([facility])    Coronary artery disease    Above knee amputation of right lower extremity ([facility])    Pressure injury of right heel, unstageable ([facility])    Hx of AKA (above knee amputation), right ([facility])    Erectile dysfunction associated with type 2 diabetes mellitus ([address]    Altered mental status    DKA, type 2, not at goal [facility])    Stenosis of right middle cerebral artery    At risk of seizures    Cardiomyopathy, ischemic    Acute ischemic right MCA stroke ([address]       Subclavian stenosis proximal to LIMA origin    Recommendation:    Patient will need angioplasty/possible stenting L subclavian to optimize flow to LIMA.    Trend troponin  Timing TBD, npo at [facility], DO  General Surgery, PGY-2  1/13/2023

## 2023-01-14 NOTE — PLAN OF CARE
Problem: Discharge Planning  Goal: Discharge to home or other facility with appropriate resources  1/13/2023 2259 by Court Randolph RN  Outcome: Progressing  1/13/2023 1242 by Damion Lagunas RN  Outcome: Progressing     Problem: Chronic Conditions and Co-morbidities  Goal: Patient's chronic conditions and co-morbidity symptoms are monitored and maintained or improved  1/13/2023 2259 by Court Randolph RN  Outcome: Progressing  1/13/2023 1242 by Damion Lagunas RN  Outcome: Progressing     Problem: Confusion  Goal: Confusion, delirium, dementia, or psychosis is improved or at baseline  Description: INTERVENTIONS:  1. Assess for possible contributors to thought disturbance, including medications, impaired vision or hearing, underlying metabolic abnormalities, dehydration, psychiatric diagnoses, and notify attending LIP  2. [place] high risk fall precautions, as indicated  3. Provide frequent short contacts to provide reality reorientation, refocusing and direction  4. Decrease environmental stimuli, including noise as appropriate  5. Monitor and intervene to maintain adequate nutrition, hydration, elimination, sleep and activity  6. If unable to ensure safety without constant attention obtain sitter and review sitter guidelines with assigned personnel  7. Initiate Psychosocial CNS and Spiritual Care consult, as indicated  1/13/2023 2259 by Court Randolph RN  Outcome: Progressing  1/13/2023 1242 by Damion Lagunas RN  Outcome: Progressing     Problem: Skin/Tissue Integrity  Goal: Absence of new skin breakdown  Description: 1. Monitor for areas of redness and/or skin breakdown  2. Assess vascular access sites hourly  3. Every 4-6 hours minimum:  Change oxygen saturation probe site  4. Every 4-6 hours:  If on nasal continuous positive airway pressure, respiratory therapy assess nares and determine need for appliance change or resting period.   1/13/2023 2259 by Court Randolph RN  Outcome: Progressing  1/13/2023 [phone] by Vidya Carlin RN  Outcome: Progressing     Problem: Safety - Adult  Goal: Free from fall injury  1/13/2023 2259 by Aniceto Sahni RN  Outcome: Progressing  1/13/2023 1242 by Vidya Carlin RN  Outcome: Progressing  Flowsheets (Taken 1/13/2023 1235)  Free From Fall Injury: Instruct family/caregiver on patient safety     Problem: ABCDS Injury Assessment  Goal: Absence of physical injury  1/13/2023 2259 by Aniceto Sahni RN  Outcome: Progressing  1/13/2023 1242 by Vidya Carlin RN  Outcome: Progressing     Problem: Pain  Goal: Verbalizes/displays adequate comfort level or baseline comfort level  1/13/2023 2259 by Aniceto Sahni RN  Outcome: Progressing  1/13/2023 1242 by Vidya Carlin RN  Outcome: Progressing     Problem: Respiratory - Adult  Goal: Achieves optimal ventilation and oxygenation  1/13/2023 2259 by Aniceto Sahni RN  Outcome: Progressing  1/13/2023 2114 by Esther Rg RCP  Outcome: Progressing

## 2023-01-14 NOTE — PROGRESS NOTES
Transfer of care note      Patient is a 62 yo male admitted on 1/7/23 for AMS. Last well known was 1/5/23. Patient was found to be disheveled and having poor intake. In the ER patient was found to be hypertensive and elevated blood glucose levels (concern for DKA)  EKG showed STEMI, cardiology recommended starting the patient on Heparin gtt  CTA head showed severe stenosis in the left subclavian artery 2/2 noncalcified atheroscleotic plaque at the level of left vertebral artery. Patient was transferred to ICU for Hypertensive emergency and DKA management    In the ICU patient was assessed by cardiology and neurology and underwent cardiac cath which showed significant multivessel CAD, and patent grafts. Cardiology recommended continued medical therapy. Patient received Nitro drip for HTN emergency and was weaned off  Endovascular surgery was consulted for stenosis of subclavian artery but no intervention was recommended by them. Neurology was consulted for AMS did MRI brain which showed tiny acute infarcts in R temporal region per neurology not the cause of encephalopathy and so LTME was done which did not show electrographic seizures. Examination  Patient seen and examined at bedside with Dr. Patrick Vann  Patient is alert and oriented to place but disoriented to time. 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    Proximal and distal 70% stenosis   -99% stenosis at proximal left subclavian artery at origin of LIMA, leading to its underfilling.      Systolic CHF  -EF 51-73%, moderate reduction in LV systolic function  -Continue IV lasix, Coreg, lisinopril  -Cardiology on board    Leukocytosis   -Elevated WBC count  -Blood Culture NG 5days  -Urine Culture negative 1/7/23  -Will do repeat UA, Blood culture x2  -Currently on Rocephin    T2DM  -Medium dose sliding scale  -Hypoglycemia protocol in place    AMS   -Neurology on board  -MRI brain showed tiny acute infarct   -LTME no seizure activty  -Continue ASA, Lipitor 80 mg  -Continue Keppra 500 mg BID and Vimpat 100 mg BID    Left Subclavian stenosis  -On cath 99% stenosis at proximal left subclavian artery at origin of LIMA, leading to its underfilling.   -Endovascular following, appreciate recommendations  -no intervention, CTA is stable and L ICA and L VA occlusion are chronic and known before 2019  -f/u Neuroendovascular clinic in 6 months      Hypertension  -On coreg and Lisinopril      Electronically signed by Hattie Gonzalez MD on 1/14/2023 at 11:54 AM

## 2023-01-14 NOTE — PROGRESS NOTES
Division of Vascular Surgery             Progress Note      Name: Celso Oropeza  MRN: [id number]         Overnight Events:     None      Subjective:     Patient seen and examined this morning, no overnight events. Patient remains VSS, afebrile. On 4 LNC. Good UOP. Sleeping comfortably this morning. Continues to deny chest pain this morning. Physical Exam:     Vitals:  BP (!) 148/72   Pulse 100   Temp 98.2 °F (36.8 °C) (Oral)   Resp 20   Ht 5' 5\" (1.651 m)   Wt 149 lb 11.1 oz (67.9 kg)   SpO2 94%   BMI 24.91 kg/m²     Physical Exam:  Vital signs and Nurse's note reviewed  Gen:  Alert to self, no acute distress  HEENT: PERRLA, EOMI, no scleral icterus, oral mucosa moist  Neck: Supple  Chest: Symmetric rise with inhalation, no evidence of trauma  CVS: Regular rate and rhythm, no murmurs, no rubs or gallops  Resp: Good bilateral air entry, clear to auscultation b/l, no wheeze or rhonchi  Abd: soft, non-tender, non-distended, bowel sounds present. Ext:s/p  R AKA well healed, no open wounds. L DP/PT signal  CNS: Moves all extremities, no gross focal motor deficits  Skin: No erythema or ulcerations   Pulses: R> L radial signal, R palpable      Imaging:   No results found. Assessment:     Subclavian stenosis proximal to LIMA origin      Plan:     Examined the patient in person with attendings during round. Will discuss with cardiology whether NSTEMI and cardiac ischemia isolated to LIMA to LAD distribution. From catheter for, patient has multivessel disease. If appears to be mainly related to LIMA to LAD bypass distribution, patient will need angioplasty/possible stenting L subclavian to optimize flow to LIMA. Trend troponin  Timing TBD, npo at mdn      ----------------------------------------  [facility] MINDI Justiceuth: [phone]  C: [phone]

## 2023-01-15 ENCOUNTER — ANESTHESIA EVENT (OUTPATIENT)
Dept: OPERATING ROOM | Age: 58
End: 2023-01-15

## 2023-01-15 ENCOUNTER — ANESTHESIA (OUTPATIENT)
Dept: OPERATING ROOM | Age: 58
End: 2023-01-15

## 2023-01-15 LAB
ABSOLUTE EOS #: 0.39 K/UL (ref 0–0.44)
ABSOLUTE IMMATURE GRANULOCYTE: 0.03 K/UL (ref 0–0.3)
ABSOLUTE LYMPH #: 3.32 K/UL (ref 1.1–3.7)
ABSOLUTE MONO #: 0.96 K/UL (ref 0.1–1.2)
ANION GAP SERPL CALCULATED.3IONS-SCNC: 10 MMOL/L (ref 9–17)
BASOPHILS # BLD: 0 % (ref 0–2)
BASOPHILS ABSOLUTE: 0.03 K/UL (ref 0–0.2)
BUN BLDV-MCNC: 12 MG/DL (ref 6–20)
C-REACTIVE PROTEIN: 49.1 MG/L (ref 0–5)
CALCIUM SERPL-MCNC: 8.6 MG/DL (ref 8.6–10.4)
CHLORIDE BLD-SCNC: 101 MMOL/L (ref 98–107)
CO2: 25 MMOL/L (ref 20–31)
CREAT SERPL-MCNC: 0.96 MG/DL (ref 0.7–1.2)
EOSINOPHILS RELATIVE PERCENT: 4 % (ref 1–4)
GFR SERPL CREATININE-BSD FRML MDRD: >60 ML/MIN/1.73M2
GLUCOSE BLD-MCNC: 179 MG/DL (ref 70–99)
GLUCOSE BLD-MCNC: 183 MG/DL (ref 75–110)
GLUCOSE BLD-MCNC: 201 MG/DL (ref 75–110)
GLUCOSE BLD-MCNC: 219 MG/DL (ref 75–110)
GLUCOSE BLD-MCNC: 334 MG/DL (ref 75–110)
GLUCOSE BLD-MCNC: 337 MG/DL (ref 75–110)
GLUCOSE BLD-MCNC: 382 MG/DL (ref 75–110)
HCT VFR BLD CALC: 43.5 % (ref 40.7–50.3)
HEMOGLOBIN: 14.2 G/DL (ref 13–17)
IMMATURE GRANULOCYTES: 0 %
LYMPHOCYTES # BLD: 36 % (ref 24–43)
MCH RBC QN AUTO: 29.4 PG (ref 25.2–33.5)
MCHC RBC AUTO-ENTMCNC: 32.6 G/DL (ref 28.4–34.8)
MCV RBC AUTO: 90.1 FL (ref 82.6–102.9)
MONOCYTES # BLD: 10 % (ref 3–12)
NRBC AUTOMATED: 0 PER 100 WBC
PARTIAL THROMBOPLASTIN TIME: 54.6 SEC (ref 20.5–30.5)
PARTIAL THROMBOPLASTIN TIME: 58.6 SEC (ref 20.5–30.5)
PARTIAL THROMBOPLASTIN TIME: 62.7 SEC (ref 20.5–30.5)
PDW BLD-RTO: 13.3 % (ref 11.8–14.4)
PLATELET # BLD: 292 K/UL (ref 138–453)
PMV BLD AUTO: 11.2 FL (ref 8.1–13.5)
POTASSIUM SERPL-SCNC: 3.3 MMOL/L (ref 3.7–5.3)
PROCALCITONIN: 0.14 NG/ML
RBC # BLD: 4.83 M/UL (ref 4.21–5.77)
SEG NEUTROPHILS: 49 % (ref 36–65)
SEGMENTED NEUTROPHILS ABSOLUTE COUNT: 4.49 K/UL (ref 1.5–8.1)
SODIUM BLD-SCNC: 136 MMOL/L (ref 135–144)
WBC # BLD: 9.2 K/UL (ref 3.5–11.3)

## 2023-01-15 PROCEDURE — 36415 COLL VENOUS BLD VENIPUNCTURE: CPT

## 2023-01-15 PROCEDURE — 97530 THERAPEUTIC ACTIVITIES: CPT

## 2023-01-15 PROCEDURE — 80048 BASIC METABOLIC PNL TOTAL CA: CPT

## 2023-01-15 PROCEDURE — 6360000002 HC RX W HCPCS

## 2023-01-15 PROCEDURE — 2500000003 HC RX 250 WO HCPCS: Performed by: STUDENT IN AN ORGANIZED HEALTH CARE EDUCATION/TRAINING PROGRAM

## 2023-01-15 PROCEDURE — 2580000003 HC RX 258: Performed by: STUDENT IN AN ORGANIZED HEALTH CARE EDUCATION/TRAINING PROGRAM

## 2023-01-15 PROCEDURE — 85025 COMPLETE CBC W/AUTO DIFF WBC: CPT

## 2023-01-15 PROCEDURE — 6370000000 HC RX 637 (ALT 250 FOR IP)

## 2023-01-15 PROCEDURE — 6370000000 HC RX 637 (ALT 250 FOR IP): Performed by: INTERNAL MEDICINE

## 2023-01-15 PROCEDURE — 6370000000 HC RX 637 (ALT 250 FOR IP): Performed by: STUDENT IN AN ORGANIZED HEALTH CARE EDUCATION/TRAINING PROGRAM

## 2023-01-15 PROCEDURE — 6360000002 HC RX W HCPCS: Performed by: STUDENT IN AN ORGANIZED HEALTH CARE EDUCATION/TRAINING PROGRAM

## 2023-01-15 PROCEDURE — 99231 SBSQ HOSP IP/OBS SF/LOW 25: CPT | Performed by: FAMILY MEDICINE

## 2023-01-15 PROCEDURE — C9254 INJECTION, LACOSAMIDE: HCPCS | Performed by: STUDENT IN AN ORGANIZED HEALTH CARE EDUCATION/TRAINING PROGRAM

## 2023-01-15 PROCEDURE — 84145 PROCALCITONIN (PCT): CPT

## 2023-01-15 PROCEDURE — 97535 SELF CARE MNGMENT TRAINING: CPT

## 2023-01-15 PROCEDURE — 2580000003 HC RX 258

## 2023-01-15 PROCEDURE — 2060000000 HC ICU INTERMEDIATE R&B

## 2023-01-15 PROCEDURE — 6370000000 HC RX 637 (ALT 250 FOR IP): Performed by: NURSE PRACTITIONER

## 2023-01-15 PROCEDURE — 86140 C-REACTIVE PROTEIN: CPT

## 2023-01-15 PROCEDURE — 82947 ASSAY GLUCOSE BLOOD QUANT: CPT

## 2023-01-15 PROCEDURE — 85730 THROMBOPLASTIN TIME PARTIAL: CPT

## 2023-01-15 RX ORDER — INSULIN GLARGINE 100 [IU]/ML
10 INJECTION, SOLUTION SUBCUTANEOUS NIGHTLY
Status: DISCONTINUED | OUTPATIENT
Start: 2023-01-15 | End: 2023-01-17

## 2023-01-15 RX ORDER — INSULIN GLARGINE 100 [IU]/ML
10 INJECTION, SOLUTION SUBCUTANEOUS NIGHTLY
Status: DISCONTINUED | OUTPATIENT
Start: 2023-01-15 | End: 2023-01-15

## 2023-01-15 RX ORDER — INSULIN GLARGINE 100 [IU]/ML
5 INJECTION, SOLUTION SUBCUTANEOUS NIGHTLY
Status: DISCONTINUED | OUTPATIENT
Start: 2023-01-15 | End: 2023-01-15

## 2023-01-15 RX ORDER — FAMOTIDINE 20 MG/1
20 TABLET, FILM COATED ORAL 2 TIMES DAILY
Status: DISCONTINUED | OUTPATIENT
Start: 2023-01-15 | End: 2023-01-22 | Stop reason: HOSPADM

## 2023-01-15 RX ORDER — INSULIN GLARGINE 100 [IU]/ML
10 INJECTION, SOLUTION SUBCUTANEOUS ONCE
Status: COMPLETED | OUTPATIENT
Start: 2023-01-15 | End: 2023-01-15

## 2023-01-15 RX ADMIN — INSULIN LISPRO 2 UNITS: 100 INJECTION, SOLUTION INTRAVENOUS; SUBCUTANEOUS at 09:01

## 2023-01-15 RX ADMIN — CARVEDILOL 12.5 MG: 12.5 TABLET, FILM COATED ORAL at 16:11

## 2023-01-15 RX ADMIN — Medication 1 TABLET: at 08:51

## 2023-01-15 RX ADMIN — FAMOTIDINE 20 MG: 20 TABLET, FILM COATED ORAL at 13:43

## 2023-01-15 RX ADMIN — HEPARIN SODIUM 24 UNITS/KG/HR: 10000 INJECTION, SOLUTION INTRAVENOUS at 23:25

## 2023-01-15 RX ADMIN — CARVEDILOL 12.5 MG: 12.5 TABLET, FILM COATED ORAL at 08:51

## 2023-01-15 RX ADMIN — LISINOPRIL 20 MG: 20 TABLET ORAL at 08:51

## 2023-01-15 RX ADMIN — FUROSEMIDE 40 MG: 10 INJECTION, SOLUTION INTRAMUSCULAR; INTRAVENOUS at 08:51

## 2023-01-15 RX ADMIN — CEFTRIAXONE SODIUM 1000 MG: 10 INJECTION, POWDER, FOR SOLUTION INTRAVENOUS at 08:48

## 2023-01-15 RX ADMIN — INSULIN GLARGINE 10 UNITS: 100 INJECTION, SOLUTION SUBCUTANEOUS at 22:20

## 2023-01-15 RX ADMIN — INSULIN LISPRO 2 UNITS: 100 INJECTION, SOLUTION INTRAVENOUS; SUBCUTANEOUS at 01:22

## 2023-01-15 RX ADMIN — LEVETIRACETAM 500 MG: 500 TABLET, FILM COATED ORAL at 22:13

## 2023-01-15 RX ADMIN — LACOSAMIDE 100 MG: 100 TABLET, FILM COATED ORAL at 22:13

## 2023-01-15 RX ADMIN — INSULIN LISPRO 6 UNITS: 100 INJECTION, SOLUTION INTRAVENOUS; SUBCUTANEOUS at 17:51

## 2023-01-15 RX ADMIN — SODIUM CHLORIDE, PRESERVATIVE FREE 10 ML: 5 INJECTION INTRAVENOUS at 08:51

## 2023-01-15 RX ADMIN — HEPARIN SODIUM 24 UNITS/KG/HR: 10000 INJECTION, SOLUTION INTRAVENOUS at 08:36

## 2023-01-15 RX ADMIN — FUROSEMIDE 40 MG: 10 INJECTION, SOLUTION INTRAMUSCULAR; INTRAVENOUS at 16:11

## 2023-01-15 RX ADMIN — LEVETIRACETAM 500 MG: 5 INJECTION INTRAVENOUS at 11:29

## 2023-01-15 RX ADMIN — ATORVASTATIN CALCIUM 80 MG: 80 TABLET, FILM COATED ORAL at 22:14

## 2023-01-15 RX ADMIN — SODIUM CHLORIDE, PRESERVATIVE FREE 20 MG: 5 INJECTION INTRAVENOUS at 08:51

## 2023-01-15 RX ADMIN — ASPIRIN 81 MG: 81 TABLET, CHEWABLE ORAL at 08:51

## 2023-01-15 RX ADMIN — INSULIN LISPRO 8 UNITS: 100 INJECTION, SOLUTION INTRAVENOUS; SUBCUTANEOUS at 12:56

## 2023-01-15 RX ADMIN — LACOSAMIDE 100 MG: 10 INJECTION INTRAVENOUS at 09:00

## 2023-01-15 RX ADMIN — INSULIN GLARGINE 10 UNITS: 100 INJECTION, SOLUTION SUBCUTANEOUS at 12:57

## 2023-01-15 RX ADMIN — FAMOTIDINE 20 MG: 20 TABLET, FILM COATED ORAL at 22:13

## 2023-01-15 ASSESSMENT — ENCOUNTER SYMPTOMS
ABDOMINAL PAIN: 0
CONSTIPATION: 0
VOMITING: 0
PHOTOPHOBIA: 0
DIARRHEA: 0
APNEA: 0
NAUSEA: 0
WHEEZING: 0
COLOR CHANGE: 0
SHORTNESS OF BREATH: 0
COUGH: 0
BACK PAIN: 0
ABDOMINAL DISTENTION: 0

## 2023-01-15 ASSESSMENT — PAIN SCALES - GENERAL: PAINLEVEL_OUTOF10: 0

## 2023-01-15 NOTE — PROGRESS NOTES
Occupational Therapy  Facility/Department: [facility]- [facility]  Occupational Therapy Daily Treatment Note    Name: Evans Pool  : 1965  MRN: [id number]  Date of Service: 1/15/2023    Discharge Recommendations:  Patient would benefit from continued therapy after discharge     Patient Diagnosis(es): The primary encounter diagnosis was Altered mental status, unspecified altered mental status type. Diagnoses of Hyperglycemia and Stenosis of right middle cerebral artery were also pertinent to this visit. Past Medical History:  has a past medical history of CAD (coronary artery disease), Cardiac arrest ([facility] [address] Diabetes mellitus ([facility] [address] Hyperlipidemia, Hypertension, Osteolysis, PEA (Pulseless electrical activity) ([facility] [address] PVD (peripheral vascular disease) ([facility] [address] and Wound, open. Past Surgical History:  has a past surgical history that includes Femur fracture surgery (Right, ); Cardiac catheterization (2019); Femur Surgery; Tonsillectomy (); Coronary artery bypass graft (2019); Hardware Removal (Right, 2019); Tibia fracture surgery (Right, ); Coronary artery bypass graft (N/A, 2019); RECONSTRUCTIVE REPAIR STERNAL (N/A, 2019); Hardware Removal (Right, 2019); REMOVE HARDWARE FEMUR (N/A, 2019); Carotid stent placement (2019); Leg amputation below knee (Right, 2019); Leg amputation below knee (Right, 2019); above knee amputation (Right, 2019); AMPUTATION ABOVE KNEE (Right, 2019); and Insert / replace / remove pacemaker (2023). Assessment   Performance deficits / Impairments: Decreased functional mobility ; Decreased ADL status; Decreased ROM; Decreased strength;Decreased safe awareness;Decreased cognition;Decreased endurance;Decreased fine motor control;Decreased high-level IADLs;Decreased balance;Decreased coordination;Decreased posture  Assessment: Pt making good progress toward goals this session.  Pt remains with some confusion and requires assist for safety. Collaboration with OTR for update of goals. Prognosis: Fair  Activity Tolerance  Activity Tolerance: Patient Tolerated treatment well;Treatment limited secondary to decreased cognition        Plan   Occupational Therapy Plan  Times Per Week: 2-4x/week  Current Treatment Recommendations: Balance training, Endurance training, Safety education & training, Cognitive reorientation, Equipment evaluation, education, & procurement, Patient/Caregiver education & training, Cognitive/Perceptual training, Self-Care / ADL, Coordination training, Wheelchair mobility training     Restrictions  Restrictions/Precautions  Restrictions/Precautions: Up as Tolerated, Fall Risk, General Precautions  Required Braces or Orthoses?: No  Position Activity Restriction  Other position/activity restrictions: up with assist; Hx R AKA, gamino catheter, Prosthesis present in closet    Subjective   General  Patient assessed for rehabilitation services?: Yes  Response to previous treatment: Patient with no complaints from previous session  Family / Caregiver Present: No  General Comment  Comments: RN ok'd patient for OT treatment this morning. Pt supine in bedupon PAREDES arrival. Pt agreeable to OT session and pt denies pain throughout session          Objective   Safety Devices  Type of Devices: Patient at risk for falls;Nurse notified;Sitter present;Gait belt;Left in chair  Restraints  Restraints Initially in Place: No  Balance  Sitting: Without support (EOB 10 minutes SBA, upright in chair 15-20 minutes SBA with verbal cues)  Standing: With support (RW CGAx2 for safety x1-2 minutes for transfer)  Transfer Training  Transfer Training: Yes  Overall Level of Assistance: Contact-guard assistance;Assist X2  Interventions: Verbal cues; Tactile cues; Safety awareness training  Sit to Stand: Contact-guard assistance;Assist X2  Stand to Sit: Contact-guard assistance;Assist X2  Bed to Chair: Contact-guard assistance;Assist X2  Gait  Overall Level of Assistance: Contact-guard assistance;Assist X2  Interventions: Verbal cues; Tactile cues; Safety awareness training  Distance (ft):  (Transfer from bed>recliner)  Assistive Device: Walker, rolling        ADL  Feeding: Setup;Verbal cueing;Minimal assistance  Feeding Skilled Clinical Factors: Pt demonstrated difficulty and confusion with utensil use and attempted to use for to comb hair. Initial hand over hand to get started on food and self feeding. Pt now on soft chew diet  Grooming: Minimal assistance;Verbal cueing;Setup  Grooming Skilled Clinical Factors: rinse mouth with mouthwash, comb hair, wash face and shampoo cap. UE Bathing: Setup;Verbal cueing;Stand by assistance; Increased time to complete  UE Bathing Skilled Clinical Factors: Pt repeates same area and required cues for sequencing and progressing down the body  LE Bathing: Minimal assistance;Setup;Verbal cueing; Increased time to complete  LE Bathing Skilled Clinical Factors: left foot and rear rad area  UE Dressing: Minimal assistance;Setup;Verbal cueing; Increased time to complete  UE Dressing Skilled Clinical Factors: don/doff gown  LE Dressing: Minimal assistance;Setup;Verbal cueing; Increased time to complete  Toileting Skilled Clinical Factors: Tate catheter present  Additional Comments: Pt following increased commands and required cues for sequencing and choosing appropriate tools for tasks. Pt impulsive with movements. Shampoo cap with cues and use of comb/brush hair     Activity Tolerance  Activity Tolerance: Patient tolerated treatment well  Bed mobility  Supine to Sit: Stand by assistance  Sit to Supine: Unable to assess  Scooting: Stand by assistance;Contact guard assistance  Bed Mobility Comments: Pt impulsive with movement, bedrail use and HOB raised 45 degrees. Pt concluded session seated in recliner        Cognition  Overall Cognitive Status: Exceptions  Following Commands:  Follows one step commands with increased time;Follows one step commands with repetition  Attention Span: Attends with cues to redirect  Memory: Decreased recall of recent events;Decreased recall of biographical Information  Safety Judgement: Decreased awareness of need for safety;Decreased awareness of need for assistance  Problem Solving: Decreased awareness of errors  Insights: Decreased awareness of deficits  Initiation: Requires cues for some  Sequencing: Requires cues for some  Cognition Comment: impulsive, confused, attempts to utilize fork to comb hair and mouthwash as shampoo  Orientation  Overall Orientation Status: Within Normal Limits  Orientation Level: Oriented X4     Education Given To: Patient  Education Provided: Role of Therapy; ADL Adaptive Strategies;Transfer Training  Education Provided Comments: Safety, body mechanics, self-awareness, ADL tasks  Education Method: Verbal;Demonstration  Barriers to Learning: Cognition  Education Outcome: Continued education needed     AM-PAC Score  AM-[facility] Inpatient Daily Activity Raw Score: 15 (01/15/23 1050)  AM-PAC Inpatient ADL T-Scale Score : 34.69 (01/15/23 1050)  ADL Inpatient CMS 0-100% Score: 56.46 (01/15/23 1050)  ADL Inpatient CMS G-Code Modifier : CK (01/15/23 1050)      Goals  Short Term Goals  Time Frame for Short Term Goals: By discharge  Short Term Goal 1: Pt will follow 50% commands throughout session  Short Term Goal 2: Pt will engage in UB ADL's with Min A  Short Term Goal 3: Pt will engage in LB ADL's with Mod A with AE/DME/modified techniques  Short Term Goal 4: Pt will maintain sitting balance EOB CGA 10+ mins for engagement in functional task  Short Term Goal 5: NOTIFY OTR TO UPDATE GOALS AS PT PROGRESSESS       Therapy Time   Individual Concurrent Group Co-treatment   Time In 0930         Time Out 1023         Minutes 53         Timed Code Treatment Minutes: 45 Minutes (overlap co-tx with PTA for transfer safety)       REGGIE Rosa/ERICH

## 2023-01-15 NOTE — PROGRESS NOTES
[facility]   Progress Note                   Date:   1/15/2023  Patient name: Celso Oropeza  Date of admission:  1/7/2023  4:22 PM  MRN:   [id number]  YOB: 1965  PCP: Jean-Pierre Wheatley MD    Reason for Admission:      Subjective:       Patient seen and examined. Denies chest pain or shortness of breath. Tele/vitals/labs reviewed . Discussed case with RN. Intermittent confusion and restlessness overnight and guard a bedside resolved this am     Medications:   Scheduled Meds:   famotidine  20 mg Oral BID    insulin glargine  5 Units SubCUTAneous Nightly    nicotine  1 patch TransDERmal Daily    atorvastatin  80 mg Oral Nightly    lacosamide  100 mg Oral BID    levETIRAcetam  500 mg Oral BID    carvedilol  12.5 mg Oral BID WC    lisinopril  20 mg Oral Daily    furosemide  40 mg IntraVENous BID    insulin lispro  0-8 Units SubCUTAneous Q6H    aspirin  81 mg Oral Daily    therapeutic multivitamin-minerals  1 tablet Oral Daily with breakfast    sodium chloride flush  5-40 mL IntraVENous 2 times per day       Continuous Infusions:   heparin (PORCINE) Infusion 24 Units/kg/hr (01/15/23 0836)    dextrose      sodium chloride Stopped (01/14/23 1603)       CBC:   Recent Labs     01/13/23  0614 01/14/23  0212 01/15/23  0548   WBC 12.5* 14.3* 9.2   HGB 14.4 15.0 14.2    275 292       BMP:    Recent Labs     01/13/23  0614 01/14/23  0212 01/15/23  0548    134* 136   K 3.6* 3.9 3.3*    99 101   CO2 22 23 25   BUN 10 11 12   CREATININE 0.79 0.84 0.96   GLUCOSE 165* 182* 179*       Hepatic: No results for input(s): AST, ALT, ALB, BILITOT, ALKPHOS in the last 72 hours. Troponin: No results for input(s): TROPONINI in the last 72 hours. BNP: No results for input(s): BNP in the last 72 hours. Lipids: No results for input(s): CHOL, HDL in the last 72 hours. Invalid input(s): LDLCALCU  INR: No results for input(s): INR in the last 72 hours.     Objective:   Vitals: BP (!) 149/82   Pulse 83   Temp 98.2 °F (36.8 °C) (Oral)   Resp 26   Ht 5' 5\" (1.651 m)   Wt 143 lb 1.3 oz (64.9 kg)   SpO2 95%   BMI 23.81 kg/m²     General appearance: awake, altered, in no apparent respiratory distress   HEENT: Head: Normocephalic, no lesions, without obvious abnormality  Neck: no JVD  Lungs: clear to auscultation bilaterally, no basilar rales, no wheezing   Heart: regular rate and rhythm, S1, S2 normal, no murmur, click, rub or gallop, right groin no hematoma or bleeding noted  Abdomen: soft, non-tender; bowel sounds normal  Extremities: Right AKA , no LLE edema  Neurologic: not done. EK/10/23  Sinus tachycardia, q wave in anterior leads    Echocardiogram:  23  Summary  Left ventricle is normal in size. Global left ventricular systolic function  is moderately reduced. Visually estimated EF 35-40%. Global hypokinesis  worse in the apex and all apical segments. Definity used to improve delineation of left ventricular endocardial border. LV apical thrombus is noted. Measures 1.4cm x 0.6cm. Evidence of diastolic dysfunction. Thickened mitral valve leaflets. Trivial mitral regurgitation. Coronary Angiography:   19   Procedure Summary      1. Multivessel coronary artery disease. 2. Severe LV systolic dysfunction. 3. Mild aortic insufficiency   4. Distal aorta 40% stenosis. 5. Occluded left external iliac artery. 6. Severe diffuse disease of right iliac artery. Unable to pass IABP or   Impella      Recommendations      Medical therapy as needed. Risk factor modification. Routine Post Diagnostic Cath orders. Urgent surgical CABG. Discussed with CT surgery. IV lasix. CATH Procedure Summary 2023        1. Multivessel CAD    2. Patent SVG-OM1/OM3 and SVG-RPDA grafts    3. Critical stenosis of left subclavian artery proximal to LIMA origin.     LIMA-LAD appears to be patent on left coronary angiogram.    4. Overall preserved LV systolic function        Recommendations Routine Post Diagnostic Cath orders. Medical therapy    Vascular surgery consult for consideration of subclavian artery stent      Electronically signed by Andra Latham MD on 1/13/2023 at 3:30 PM  Cardiovascular fellow  [facility]        Attending Physician  I was present during the entire procedure and performed all the critical elements of it. Samantha Whitten MD, [facility], [facility]                          Assessment / Acute Cardiac Problems:   H/o MVCAD s/p CABG [LIMA-LAD, SVG-PDA,OM 1 & distal circumflex) 4/7/2019  Ischemic cardiomyopathy,LVEF Improved on last echo >65%  LV thrombus 1/4 x 0.6 cm  Diabetes type II, in DKA - resolved  H/o CVA  ALMITA  Left subclavian stenosis  Flash pulmonary edema  Hypertensive emergency  Subacute right temporal ischemic stroke, AMS with receptive aphasia  PVD   12.    ICA and L VA occlusion are chronic and known before 2019- R ICA stent is patent- endovascular following         Plan of Treatment:   S/p cardiac cath as above with Critical stenosis of left subclavian artery proximal to LIMA origin noted on cath - vascular are following   Continue on heparin for LV thrombus, will require AC at discharge  Ischemic cardiomyopathy with ejection fraction 35 to 40% clinically not in fluid overload -continue ACE , beta-blocker and  IV diuresis bid with close monitoring on renal function  Continue on ASA and satin  Keep K>4, Mg>2           ISAAC Flowers - NP   Fellow, [address] [place] [address]   Pager - [phone]

## 2023-01-15 NOTE — ANESTHESIA PRE PROCEDURE
Department of Anesthesiology  Preprocedure Note       Name:  Sindy Timmons   Age:  61 y.o.  :  1965                                          MRN:  [id number]         Date:  2023      Surgeon: Jerri Clemosn):  Susie Kruse MD    Procedure: Procedure(s):  (2ND CASE ON 23)LEFT SUBCLAVIAN STENT PLACEMENT , POSSIBLE ANGIOPLASTY , POSSIBLE LEFT CARDIAC / SUBCLAVIAN BYPAS    Medications prior to admission:   Prior to Admission medications    Medication Sig Start Date End Date Taking? Authorizing Provider   metoprolol tartrate (LOPRESSOR) 25 MG tablet Take 2 tablets by mouth 2 times daily 22   Martinez Krishnamurthy MD   clopidogrel (PLAVIX) 75 MG tablet TAKE 1 TABLET BY MOUTH DAILY 22   Kehinde Klein MD   gabapentin (NEURONTIN) 300 MG capsule TAKE 1 CAPSULE BY MOUTH EVERY 8 HOURS 2/17/22 3/19/22  Kehinde Klein MD   metFORMIN (GLUCOPHAGE) 1000 MG tablet Take 1 tablet by mouth 2 times daily (with meals) 1/13/22 3/14/22  Kehinde Klein MD   insulin lispro, 1 Unit Dial, (HUMALOG KWIKPEN) 100 UNIT/ML SOPN INJECT SUBCUTANEOUSLY BEFORE MEALS AS DIRECTED PER SLIDING SCALE: SEE ATTACHED FOR SLIDING SCALE DIRECTIONS: Hold all insulin and notify Dr immediately if BG <60. -200: 2 UNITS; -250: 4UNITS; 251-300 6 UNITS; 301-350: 8 UNITS  Patient not taking: Reported on 2022   Kehinde Klein MD   lisinopril-hydroCHLOROthiazide (PRINZIDE;ZESTORETIC) 20-25 MG per tablet Take 2 tablets by mouth daily .   Take 1 pill if blood pressure is less than 130/80 21   Kehinde Klein MD   insulin glargine (LANTUS SOLOSTAR) 100 UNIT/ML injection pen Inject 24 Units into the skin nightly 21  Wanda Bennett MD   Insulin Syringe-Needle U-100 30G X \" 1 ML MISC 1 each by Does not apply route daily 21   Wanda Bennett MD   atorvastatin (LIPITOR) 40 MG tablet Take 1 tablet by mouth nightly 10/19/21   Tyler Stevens MD   metoprolol tartrate (LOPRESSOR) 50 MG tablet Take 1 tablet by mouth 2 times daily 10/19/21   Jeff Vanegas MD   blood glucose test strips (ONETOUCH VERIO) strip TEST 2 TIMES A DAY & AS NEEDED FOR SYMPTOMS OF IRREGULAR BLOOD GLUCOSE. 5/28/21   Kayla Gaming MD   Alcohol Swabs (B-D SINGLE USE SWABS REGULAR) PADS USE AS DIRECTED WITH TESTING SUPPLIES 5/28/21   Kayla Gaming MD   Lancets (ONETOUCH DELICA PLUS [id number]) MISC USE AS DIRECTED TO TEST BLOOD SUGAR 5/28/21   Kayla Gaming MD   sildenafil (VIAGRA) 100 MG tablet Take 1 tablet by mouth as needed for Erectile Dysfunction 1/7/21   Kayla Gaming MD   Blood Glucose Monitoring Suppl (ONE TOUCH ULTRA 2) w/Device KIT U UTD ONCE D 9/27/19   Historical Provider, MD   acetaminophen (TYLENOL) 500 MG tablet Take 2 tablets by mouth every 8 hours as needed for Pain 9/30/19   Kedar Saldivar DO   Handicap [address] by Does not apply route For 5 years 9/27/19   Kayla Gaming MD   Blood Glucose Monitoring Suppl [facility] BLOOD GLUCOSE METER) w/Device KIT 1 kit by Does not apply route once for 1 dose 9/27/19 9/27/19  Kayla Gaming MD   Insulin Pen Needle 32G X 6 MM MISC 1 each by Does not apply route daily 9/27/19   Kayla Gaming MD   furosemide (LASIX) 20 MG tablet Take 1 tablet by mouth daily  Patient not taking: Reported on 11/16/2021 7/4/19   ISAAC Chandler - CNP   aspirin 81 MG chewable tablet Take 1 tablet by mouth daily 5/10/19   Griffin Titus MD   Multiple Vitamins-Minerals (THERAPEUTIC MULTIVITAMIN-MINERALS) tablet Take 1 tablet by mouth daily (with breakfast) 5/10/19   Griffin Titus MD       Current medications:    Current Facility-Administered Medications   Medication Dose Route Frequency Provider Last Rate Last Admin    famotidine (PEPCID) tablet 20 mg  20 mg Oral BID Farhana Rojo MD   20 mg at 01/15/23 2213    insulin glargine (LANTUS) injection vial 10 Units  10 Units SubCUTAneous Nightly Farhana Rojo MD   10 Units at 01/15/23 2220    nicotine (NICODERM CQ) 14 MG/24HR 1 patch  1 patch TransDERmal Daily Juan Miguel Betts MD   1 patch at 01/15/23 0852    atorvastatin (LIPITOR) tablet 80 mg  80 mg Oral Nightly Kamari Alcantar MD   80 mg at 01/15/23 2214    lacosamide (VIMPAT) tablet 100 mg  100 mg Oral BID Henrique Magsi, APRN - CNP   100 mg at 01/15/23 2213    levETIRAcetam (KEPPRA) tablet 500 mg  500 mg Oral BID Henrique Magsi, APRN - CNP   500 mg at 01/15/23 2213    carvedilol (COREG) tablet 12.5 mg  12.5 mg Oral BID WC Oswaldo Javier, DO   12.5 mg at 01/15/23 1611    lisinopril (PRINIVIL;ZESTRIL) tablet 20 mg  20 mg Oral Daily Oswaldo Javier, DO   20 mg at 01/15/23 0851    furosemide (LASIX) injection 40 mg  40 mg IntraVENous BID Romina Waters MD   40 mg at 01/15/23 1611    insulin lispro (HUMALOG) injection vial 0-8 Units  0-8 Units SubCUTAneous Q6H Mary Innocent, DO   4 Units at 01/16/23 0244    bisacodyl (DULCOLAX) EC tablet 5 mg  5 mg Oral Daily PRN Gianna Luis MD        heparin 25,000 units in dextrose 5 % 250 mL infusion (rate based)  5-30 Units/kg/hr IntraVENous Continuous Tracey Tam MD 16.3 mL/hr at 01/15/23 2325 24 Units/kg/hr at 01/15/23 2325    glucose chewable tablet 16 g  4 tablet Oral PRN Romina Waters MD        dextrose bolus 10% 125 mL  125 mL IntraVENous GEOVANNA Waters MD        Or    dextrose bolus 10% 250 mL  250 mL IntraVENous PRROMELIA Waters MD        glucagon (rDNA) injection 1 mg  1 mg SubCUTAneous PRROMELIA Waters MD        dextrose 10 % infusion   IntraVENous Continuous GEOVANNA Waters MD        heparin (porcine) injection 4,000 Units  4,000 Units IntraVENous PRN Tracey Tam MD   4,000 Units at 01/10/23 1538    heparin (porcine) injection 2,000 Units  2,000 Units IntraVENous PRROMELIA Tam MD   2,000 Units at 01/14/23 0412    aspirin chewable tablet 81 mg  81 mg Oral Daily Tracey Tam MD   81 mg at 01/15/23 0851    therapeutic multivitamin-minerals 1 tablet  1 tablet Oral Daily with breakfast Tracey Tam MD   1 tablet at 01/15/23 0851    dextrose bolus 10% 125 mL  125 mL IntraVENous PRN Jesus Barr MD   Stopped at 01/10/23 1139    Or   • dextrose bolus 10% 250 mL  250 mL IntraVENous PRN Jesus Barr MD       • potassium chloride 10 mEq/100 mL IVPB (Peripheral Line)  10 mEq IntraVENous PRN Jesus Barr  mL/hr at 01/13/23 1013 10 mEq at 01/13/23 1013   • magnesium sulfate 1000 mg in dextrose 5% 100 mL IVPB  1,000 mg IntraVENous PRN Jesus Barr MD   Stopped at 01/12/23 1752   • sodium phosphate 10 mmol in sodium chloride 0.9 % 250 mL IVPB  10 mmol IntraVENous PRN Jesus Barr MD   Stopped at 01/10/23 1327    Or   • sodium phosphate 15 mmol in dextrose 5 % 250 mL IVPB  15 mmol IntraVENous PRN Jesus Barr MD   Stopped at 01/08/23 1315    Or   • sodium phosphate 20 mmol in dextrose 5 % 500 mL IVPB  20 mmol IntraVENous PRN Jesus Barr MD       • sodium chloride flush 0.9 % injection 5-40 mL  5-40 mL IntraVENous 2 times per day Jesus Barr MD   10 mL at 01/15/23 0851   • sodium chloride flush 0.9 % injection 5-40 mL  5-40 mL IntraVENous PRN Jesus Barr MD       • 0.9 % sodium chloride infusion   IntraVENous PRN Jesus Barr MD   Stopped at 01/14/23 1603   • ondansetron (ZOFRAN-ODT) disintegrating tablet 4 mg  4 mg Oral Q8H PRN Jesus Barr MD        Or   • ondansetron (ZOFRAN) injection 4 mg  4 mg IntraVENous Q6H PRN Jesus Barr MD       • polyethylene glycol (GLYCOLAX) packet 17 g  17 g Oral Daily PRN Jesus Barr MD       • acetaminophen (TYLENOL) tablet 650 mg  650 mg Oral Q6H PRN Jesus Barr MD        Or   • acetaminophen (TYLENOL) suppository 650 mg  650 mg Rectal Q6H PRN Jesus Barr MD           Allergies:  No Known Allergies    Problem List:    Patient Active Problem List   Diagnosis Code   • PAD (peripheral artery disease) ([facility]) I73.9   • Subacute osteomyelitis of right tibia ([facility]) M86.261   • Acute metabolic encephalopathy G93.41   • Abnormal CT of the head R93.0   • Hepatitis K75.9   • Cerebral multi-infarct  state I69.30    Multi infarct state I69.30    Carotid stenosis, bilateral I65.23    Right foot drop M21.371    Thrombocytosis D75.839    Acute CVA (cerebrovascular accident) ([facility] I63.9    Moderate malnutrition ([facility]) E44.0    Cellulitis of right lower extremity L03.115    Right-sided extracranial carotid artery stenosis I65.21    Essential hypertension I10    Type 2 diabetes mellitus with diabetic neuropathy, without long-term current use of insulin ([facility]) E11.40    Leukocytosis D72.829    Status post angiography of extremity Z98.890    Ischemic gangrene ([facility]) I96    Pyogenic inflammation of bone ([facility]) M86.9    Coronary artery disease I25.10    Above knee amputation of right lower extremity ([facility]) R33.998I    Pressure injury of right heel, unstageable ([facility]) L89.610    Hx of AKA (above knee amputation), right ([facility] Z89.611    Erectile dysfunction associated with type 2 diabetes mellitus ([facility]) E11.69, N52.1    Altered mental status R41.82    DKA, type 2, not at goal [facility]) E11.10    Stenosis of right middle cerebral artery I66.01    At risk of seizures Z91.89    Cardiomyopathy, ischemic I25.5    Acute ischemic right MCA stroke ([facility] I63.511       Past Medical History:        Diagnosis Date    CAD (coronary artery disease) 04/07/2019    triple bypass    Cardiac arrest ([facility] 04/07/2019    Diabetes mellitus ([facility] 04/2019    ON RX    Hyperlipidemia 04/2019    ON RX HAS NOT HAD SINCE 06/30/2019    Hypertension 04/2019    ON RX NONE SINCE 06/2019    Osteolysis 04/07/2019    R Knee hardware    PEA (Pulseless electrical activity) ([facility] 04/07/2019    PVD (peripheral vascular disease) ([facility] 04/07/2019    Wound, open 09/2019    RIGHT STUMP DRAINING MOD AMT BLOODY DRAINAGE       Past Surgical History:        Procedure Laterality Date    ABOVE KNEE AMPUTATION Right 09/28/2019    AMPUTATION ABOVE KNEE Right 09/28/2019    RIGHT ABOVE KNEE AMPUTATION performed by Buddy Deal MD at [place] [address]  CARDIAC CATHETERIZATION  04/07/2019    MVD-Stv    CAROTID STENT PLACEMENT  07/03/2019    ACCULINK CAROTID STENT PLACED, MRI CONDITIONAL 5, 3T OK.      CORONARY ARTERY BYPASS GRAFT  04/07/2019    x4 per [facility] [address] GRAFT N/A 04/07/2019    EMERGENT CPR,  CORONARY ARTERY BYPASS X4, ON PUMP ,CHEST LEFT OPEN; SWAN, ANGELA PER ANESTHESIA performed by Amrit Fields MD at [address]      unsure if correct known tibial surgery    HARDWARE REMOVAL Right 04/07/2019    R Knee internal screw protruding through skin    HARDWARE REMOVAL Right 04/22/2019    HARDWARE REMOVAL RIGHT TIBIA,    Jose Maria Bourgeois / REPLACE / REMOVE PACEMAKER  01/13/2023    Epicardial leads present from CABG - abandoned leads - NOT MRI Safe - NO MRI - KMM (MRI Tech)    LEG AMPUTATION BELOW KNEE Right 08/25/2019    RIGHT FOOT GUILLOTINE AMPUTATION performed by Dianna Reardon MD at [facility] Right 08/27/2019    RIGHT BELOW KNEE AMPUTATION FORMALIZATION performed by Dianna Reardon MD at [facility] N/A 04/08/2019    STERNAL  WOUND WASHOUT AND CLOSURE S/P CABG performed by Amrit Fields MD at [address] N/A 04/22/2019    HARDWARE REMOVAL RIGHT TIBIA, IRRIGATION AND DEBRIDEMENT [facility] performed by lCint Borjas MD at [address] Right 2000    [address] off roof, external fixation and internal hardware    TONSILLECTOMY  1974       Social History:    Social History     Tobacco Use    Smoking status: Every Day     Packs/day: 1.00     Years: 20.00     Pack years: 20.00     Types: Cigarettes     Start date: 9/1/2020    Smokeless tobacco: Never    Tobacco comments:     started to smoke again 09/01/2020 here or there out of boredom    Substance Use Topics    Alcohol use: No     Comment: RECOVERING X 20 YEARS Ready to quit: Not Answered  Counseling given: Not Answered  Tobacco comments: started to smoke again 09/01/2020 here or there out of boredom       Vital Signs (Current):   Vitals:    01/16/23 0030 01/16/23 0315 01/16/23 0435 01/16/23 0538   BP: (!) 168/81 (!) 141/80 (!) 151/90 (!) 151/90   Pulse: 83 77 87 78   Resp: 20 16 16 20   Temp:  97.7 °F (36.5 °C)  98 °F (36.7 °C)   TempSrc:  Oral     SpO2:  92%     Weight:       Height:                                                  BP Readings from Last 3 Encounters:   01/16/23 (!) 151/90   08/24/22 130/80   01/13/22 135/76       NPO Status:                                                                                 BMI:   Wt Readings from Last 3 Encounters:   01/15/23 143 lb 1.3 oz (64.9 kg)   01/08/23 190 lb (86.2 kg)   08/23/22 180 lb (81.6 kg)     Body mass index is 23.81 kg/m².     CBC:   Lab Results   Component Value Date/Time    WBC 9.2 01/15/2023 05:48 AM    RBC 4.83 01/15/2023 05:48 AM    HGB 14.2 01/15/2023 05:48 AM    HCT 43.5 01/15/2023 05:48 AM    MCV 90.1 01/15/2023 05:48 AM    RDW 13.3 01/15/2023 05:48 AM     01/15/2023 05:48 AM       CMP:   Lab Results   Component Value Date/Time     01/15/2023 05:48 AM    K 3.3 01/15/2023 05:48 AM     01/15/2023 05:48 AM    CO2 25 01/15/2023 05:48 AM    BUN 12 01/15/2023 05:48 AM    CREATININE 0.96 01/15/2023 05:48 AM    GFRAA >60 08/23/2022 11:39 PM    LABGLOM >60 01/15/2023 05:48 AM    GLUCOSE 179 01/15/2023 05:48 AM    PROT 7.2 01/07/2023 04:46 PM    CALCIUM 8.6 01/15/2023 05:48 AM    BILITOT 0.4 01/07/2023 04:46 PM    ALKPHOS 125 01/07/2023 04:46 PM    AST 16 01/07/2023 04:46 PM    ALT 18 01/07/2023 04:46 PM       POC Tests:   Recent Labs     01/16/23  0237   POCGLU 254*       Coags:   Lab Results   Component Value Date/Time    PROTIME 13.7 08/23/2022 11:39 PM    INR 1.1 08/23/2022 11:39 PM    APTT 58.6 01/15/2023 01:16 PM       HCG (If Applicable): No results found for: PREGTESTUR, PREGSERUM, HCG, HCGQUANT     ABGs: No results found for: PHART, PO2ART, EYU2XHA, ZIH6UVT, BEART, R9BSWGAX     Type & Screen (If Applicable):  No results found for: LABABO, LABRH    Drug/Infectious Status (If Applicable):  Lab Results   Component Value Date/Time    HEPCAB NONREACTIVE 01/13/2022 09:40 AM       COVID-19 Screening (If Applicable): No results found for: COVID19      TTE  1/14/2023  Left ventricle is normal in size. Global left ventricular systolic function  is moderately reduced. Visually estimated EF 35-40%. Global hypokinesis  worse in the apex and all apical segments. Definity used to improve delineation of left ventricular endocardial border. LV apical thrombus is noted. Measures 1.4cm x 0.6cm. Evidence of diastolic dysfunction. Thickened mitral valve leaflets. Trivial mitral regurgitation      Anesthesia Evaluation  Patient summary reviewed no history of anesthetic complications:   Airway: Mallampati: II          Dental:          Pulmonary:   (+) decreased breath sounds current smoker                           Cardiovascular:    (+) hypertension:, CAD:, CABG/stent:,         Rhythm: regular  Rate: normal                    Neuro/Psych:   (+) CVA:,              ROS comment: Cerebral multi-infarct state  Multi infarct state  Carotid stenosis, bilateral    Right-sided extracranial carotid artery stenosis GI/Hepatic/Renal:   (+) liver disease:,           Endo/Other:    (+) DiabetesType II DM, , .                 Abdominal:             Vascular:   + PVD, aortic or cerebral, . Other Findings:           Anesthesia Plan      general     ASA 4       Induction: intravenous. arterial line  MIPS: Postoperative opioids intended, Prophylactic antiemetics administered and Postoperative trial extubation. Anesthetic plan and risks discussed with patient. Use of blood products discussed with patient whom consented to blood products.    Plan discussed with CRNA.              Narrative & Impression    Sinus tachycardia  Minimal voltage criteria for LVH, may be normal variant  Septal infarct (cited on or before 07-JAN-2023)  ST & T wave abnormality, consider inferior ischemia  Abnormal ECG  When compared with ECG of 09-JAN-2023 12:11,  Vent. rate has increased BY  80 BPM  ST elevation now present in Anterior leads  T wave inversion no longer evident in Anterolateral leads      Specimen Collected: 01/10/23 20:50 EST        CONCLUSIONS     Summary  Left ventricle is normal in size. Global left ventricular systolic function  is moderately reduced. Visually estimated EF 35-40%. Global hypokinesis  worse in the apex and all apical segments. Definity used to improve delineation of left ventricular endocardial border. LV apical thrombus is noted. Measures 1.4cm x 0.6cm. Evidence of diastolic dysfunction. Thickened mitral valve leaflets. Trivial mitral regurgitation.       CATH Procedure Summary 1/13/2023        1. Multivessel CAD    2. Patent SVG-OM1/OM3 and SVG-RPDA grafts    3. Critical stenosis of left subclavian artery proximal to LIMA origin.    LIMA-LAD appears to be patent on left coronary angiogram.    4. Overall preserved LV systolic function       Cards  Assessment / Acute Cardiac Problems:   1. H/o MVCAD s/p CABG [LIMA-LAD, SVG-PDA,OM 1 & distal circumflex) 4/7/2019  2. Ischemic cardiomyopathy,LVEF Improved on last echo >65%  3. LV thrombus 1/4 x 0.6 cm  4. Diabetes type II, in DKA - resolved  5. H/o CVA  6. ALMITA  7. Left subclavian stenosis  8. Flash pulmonary edema  9. Hypertensive emergency  10. Subacute right temporal ischemic stroke, AMS with receptive aphasia  11. PVD   12. ICA and L VA occlusion are chronic and known before 2019- R ICA stent is patent- endovascular following            Plan of Treatment:   1.  S/p cardiac cath as above with Critical stenosis of left subclavian artery proximal to LIMA origin noted on cath - vascular are following 2. Continue on heparin for LV thrombus, will require AC at discharge  3. Ischemic cardiomyopathy with ejection fraction 35 to 40% clinically not in fluid overload -continue ACE , beta-blocker and  IV diuresis bid with close monitoring on renal function  4. Continue on ASA and satin  5.  Keep K>4, Mg>2       Ed Dempsey MD   1/16/2023

## 2023-01-15 NOTE — PLAN OF CARE
Problem: Discharge Planning  Goal: Discharge to home or other facility with appropriate resources  Outcome: Progressing     Problem: Chronic Conditions and Co-morbidities  Goal: Patient's chronic conditions and co-morbidity symptoms are monitored and maintained or improved  Outcome: Progressing     Problem: Confusion  Goal: Confusion, delirium, dementia, or psychosis is improved or at baseline  Description: INTERVENTIONS:  1. Assess for possible contributors to thought disturbance, including medications, impaired vision or hearing, underlying metabolic abnormalities, dehydration, psychiatric diagnoses, and notify attending LIP  2. [place] high risk fall precautions, as indicated  3. Provide frequent short contacts to provide reality reorientation, refocusing and direction  4. Decrease environmental stimuli, including noise as appropriate  5. Monitor and intervene to maintain adequate nutrition, hydration, elimination, sleep and activity  6. If unable to ensure safety without constant attention obtain sitter and review sitter guidelines with assigned personnel  7. Initiate Psychosocial CNS and Spiritual Care consult, as indicated  Outcome: Progressing     Problem: Skin/Tissue Integrity  Goal: Absence of new skin breakdown  Description: 1. Monitor for areas of redness and/or skin breakdown  2. Assess vascular access sites hourly  3. Every 4-6 hours minimum:  Change oxygen saturation probe site  4. Every 4-6 hours:  If on nasal continuous positive airway pressure, respiratory therapy assess nares and determine need for appliance change or resting period.   Outcome: Progressing     Problem: Safety - Adult  Goal: Free from fall injury  Outcome: Progressing     Problem: ABCDS Injury Assessment  Goal: Absence of physical injury  Outcome: Progressing     Problem: Pain  Goal: Verbalizes/displays adequate comfort level or baseline comfort level  Outcome: Progressing     Problem: Respiratory - Adult  Goal: Achieves optimal ventilation and oxygenation  Outcome: Progressing

## 2023-01-15 NOTE — PROGRESS NOTES
Physical Therapy  Facility/Department: [facility] CAR 2- STEPDOWN  Daily Treatment Note    Name: Candace Swenson  : 1965  MRN: [id number]  Date of Service: 1/15/2023    Discharge Recommendations:  Patient would benefit from continued therapy after discharge   PT Equipment Recommendations  Equipment Needed: No (pt states has a walker and w/c)      Patient Diagnosis(es): The primary encounter diagnosis was Altered mental status, unspecified altered mental status type. Diagnoses of Hyperglycemia and Stenosis of right middle cerebral artery were also pertinent to this visit. Past Medical History:  has a past medical history of CAD (coronary artery disease), Cardiac arrest ([facility] [address] Diabetes mellitus ([facility] [address] Hyperlipidemia, Hypertension, Osteolysis, PEA (Pulseless electrical activity) ([facility] [address] PVD (peripheral vascular disease) ([facility] [address] and Wound, open. Past Surgical History:  has a past surgical history that includes Femur fracture surgery (Right, ); Cardiac catheterization (2019); Femur Surgery; Tonsillectomy (); Coronary artery bypass graft (2019); Hardware Removal (Right, 2019); Tibia fracture surgery (Right, ); Coronary artery bypass graft (N/A, 2019); RECONSTRUCTIVE REPAIR STERNAL (N/A, 2019); Hardware Removal (Right, 2019); REMOVE HARDWARE FEMUR (N/A, 2019); Carotid stent placement (2019); Leg amputation below knee (Right, 2019); Leg amputation below knee (Right, 2019); above knee amputation (Right, 2019); AMPUTATION ABOVE KNEE (Right, 2019); and Insert / replace / remove pacemaker (2023). Assessment   Assessment: Pt ambulated ~3 ft with RW with CGA x 2 hopping d/t R AKA. Pt impulsive with mobility but able to redirect. Pt will benefit from continued therapy while in acute care as well as at discharge.   Therapy Prognosis: Good  History: Hx of AKA  Barriers to Learning: impulsive  Activity Tolerance  Activity Tolerance: Patient tolerated treatment well     Plan   Physcial Therapy Plan  General Plan: 3-5 times per week  Current Treatment Recommendations: Strengthening, Balance training, Functional mobility training, Transfer training, Endurance training, Neuromuscular re-education, Cognitive reorientation, Safety education & training, Patient/Caregiver education & training, Therapeutic activities  Safety Devices  Type of Devices: Patient at risk for falls, Nurse notified, Sitter present, Gait belt, Left in chair (PAREDES present upon exiting)  Restraints  Restraints Initially in Place: No     Restrictions  Restrictions/Precautions  Restrictions/Precautions: Up as Tolerated  Required Braces or Orthoses?: No  Position Activity Restriction  Other position/activity restrictions: up with assist; Hx R AKA, gamino catheter     Subjective   General  Chart Reviewed: Yes  Response To Previous Treatment: Patient with no complaints from previous session. Family / Caregiver Present: No  General Comment  Comments: Pt retired to recliner with PAREDES and sitter present  Subjective  Subjective: RN and pt agreeable to PT. Pt sitting upright in bed upon arrival.  Pt having no c/o. Pt pleasant and cooperative t/o, but impulsive        Cognition   Orientation  Overall Orientation Status: Within Functional Limits  Cognition  Overall Cognitive Status: Exceptions  Safety Judgement: Decreased awareness of need for safety;Decreased awareness of need for assistance  Problem Solving: Decreased awareness of errors  Initiation: Requires cues for some  Sequencing: Requires cues for some  Cognition Comment: impulsive     Objective   Bed mobility  Sit to Supine: Stand by assistance  Scooting: Stand by assistance  Bed Mobility Comments: Assessed with HOB slightly elevated.   Able to assess as pt attempting to put R prosthetic on while in bed and kept lying back into bed to frankie; didn't frankie R prosthesis today as unsure of proper application  Transfers  Sit to Stand: Contact guard assistance;2 Person Assistance  Stand to Sit: 2 Person Assistance;Contact guard assistance  Comment: Assessed to RW with pt pulling self to RW to stand. Verbal cues for hand placement prior to sitting in recliner. Ambulation  Surface: Level tile  Device: Rolling Walker  Assistance: Contact guard assistance;2 Person assistance  Quality of Gait: hopping  Distance: ~3 ft  Comments: R AKA. Pt able to hop to recliner with CGA x 2 for safety and manage lines  More Ambulation?: No     Balance  Posture: Fair  Sitting - Static: Fair  Sitting - Dynamic: Fair  Standing - Static: Fair  Standing - Dynamic: Fair  Comments: assessed sitting EOB and standing at RW  Exercise Treatment: deferred for PAREDES to complete ADLs        OutComes Score  AM-PAC Score  AM-PAC Inpatient Mobility Raw Score : 19 (01/15/23 0950)  AM-PAC Inpatient T-Scale Score : 45.44 (01/15/23 0950)  Mobility Inpatient CMS 0-100% Score: 41.77 (01/15/23 0950)  Mobility Inpatient CMS G-Code Modifier : CK (01/15/23 0950)     Goals  Short Term Goals  Time Frame for Short Term Goals: 12 visits  Short Term Goal 1: Pt able to roll side to side in bed at min/mod A for positon change  Short Term Goal 2: Pt able to perform supine<>sit at mod A  Short Term Goal 3: Pt to tolerate dangling at EOB for 15 to 20 minutes to improve core stregnth/seated balance to progress to transfers. Short Term Goal 4: Pt able to improve sitting balance at EOB to fair- to improve independence  Short Term Goal 5: Attempt sit <>stand with benito inman with 2 person assist , and tolerate standing for 1 to 2 minutes. .  Short Term Goal 6: Transfers to [facility] with transfer lift system to tolerate sitting in chair for meals. Patient Goals   Patient Goals : Not able to state. Education  Patient Education  Education Given To: Patient  Education Provided: Role of Therapy;Plan of Care;Precautions; Fall Prevention Strategies;Transfer Training  Education Method: Verbal  Barriers to Learning: Cognition  Education Outcome: Demonstrated understanding;Continued education needed      Therapy Time   Individual Concurrent Group Co-treatment   Time In 0932         Time Out 4056         Minutes 16         Timed Code Treatment Minutes: 8 Minutes (co treatment with PAREDES)       Gloria Live PTA

## 2023-01-15 NOTE — PROGRESS NOTES
Division of Vascular Surgery             Progress Note      Name: Andres Moon  MRN: [id number]         Overnight Events:     None      Subjective:     Patient seen and examined this morning, no overnight events. Patient remains VSS, afebrile. On room air. Good UOP. Sleeping comfortably this morning. Continues to deny chest pain this morning. Pleasant. Physical Exam:     Vitals:  /77   Pulse 87   Temp 98.1 °F (36.7 °C) (Axillary)   Resp 20   Ht 5' 5\" (1.651 m)   Wt 143 lb 1.3 oz (64.9 kg)   SpO2 93%   BMI 23.81 kg/m²     Physical Exam:  Vital signs and Nurse's note reviewed  Gen:  Alert to self, no acute distress  HEENT: PERRLA, EOMI, no scleral icterus, oral mucosa moist  Neck: Supple  Chest: Symmetric rise with inhalation, no evidence of trauma  CVS: Regular rate and rhythm, no murmurs, no rubs or gallops  Resp: Good bilateral air entry, clear to auscultation b/l, no wheeze or rhonchi  Abd: soft, non-tender, non-distended, bowel sounds present. Ext:s/p  R AKA well healed, no open wounds. L DP/PT signal  CNS: Moves all extremities, no gross focal motor deficits  Skin: No erythema or ulcerations   Pulses: R> L radial signal, R palpable      Imaging:   No results found. Assessment:     Subclavian stenosis proximal to LIMA origin      Plan:     Examined the patient in person with attending during rounds. Plan for LUE angiogram with subclavian artery stent placement vs angioplasty. The consent was reviewed with the patient and his son who is acting decision-maker. The procedure, alternatives, risks were discussed and all questions were answered. The consent was signed, witnessed, and placed in the patient's chart. Timing TBD, after 4pm  Npo at mdn      ----------------------------------------  [facility] MINDI Justiceuth: [phone]  C: [phone]

## 2023-01-15 NOTE — PROGRESS NOTES
Endovascular Neurosurgery Progress Note    SUBJECTIVE:   Patient was awake and alert, disoriented about time/place     Review of Systems:  CONSTITUTIONAL:  negative for fevers, chills, fatigue and malaise    EYES:  negative for double vision, blurred vision and photophobia     HEENT:  negative for tinnitus, epistaxis and sore throat    RESPIRATORY:  negative for cough, shortness of breath, wheezing    CARDIOVASCULAR:  negative for chest pain, palpitations, syncope, edema    GASTROINTESTINAL:  negative for nausea, vomiting    GENITOURINARY:  negative for incontinence    MUSCULOSKELETAL:  negative for neck or back pain    NEUROLOGICAL:  Negative for weakness and tingling  negative for headaches and dizziness    PSYCHIATRIC:  negative for anxiety      Review of systems otherwise negative. OBJECTIVE:     Vitals:    01/15/23 0456   BP: 137/77   Pulse: 87   Resp: 20   Temp: 98.1 °F (36.7 °C)   SpO2: 93%        General:  Gen: normal habitus, NAD  HEENT: NCAT, mucosa moist  Cvs: RRR, S1 S2 normal  Resp: symmetric unlabored breathing  Abd: s/nd/nt  Ext: no edema  Skin: no lesions seen, warm and dry    Neuro:  Gen: Disoriented to Time, Place and Person  Lang/speech: Follows commands   CN: PERRL, EOMI, VFF, V1-3 intact, mild right face droop, hearing intact  Motor: grossly 5/5 UE and LE on the left, right arm 4/5, right leg amputated above knee 1/5  Sense: decrease on the left  Coord: deferred  DTR: deferred  Gait: deferred    NIH Stroke Scale:   1a  Level of consciousness: 0   1b. LOC questions:  0 - answers both questions correctly   1c. LOC commands: 0 - performs both tasks correctly   2. Best Gaze: 0 - normal   3. Visual: 0 - no visual loss   4. Facial Palsy: 1 - minor paralysis (flattened nasolabial fold, asymmetric on smiling)   5a. Motor left arm: 0 - no drift, limb holds 90 (or 45) degrees for full 10 seconds   5b. Motor right arm: 1   6a.  Motor left le - no drift; leg holds 30 degree position for full 5 seconds   6b  Motor right leg:  3 - no effort against gravity; leg falls to bed immediately (pt has R AKA)   7. Limb Ataxia: 0 - absent   8. Sensory: 1 - mild to moderate sensory loss; patient feels pinprick is less sharp or is dull on the affected side; there is a loss of superficial pain with pinprick but patient is aware of being touched    9. Best Language:  0   10. Dysarthria: 0 - normal   11. Extinction and Inattention: 0 - no abnormality         Total:   6     MRS: 05      LABS:   Reviewed. Lab Results   Component Value Date    HGB 14.2 01/15/2023    WBC 9.2 01/15/2023     01/15/2023     01/15/2023    BUN 12 01/15/2023    CREATININE 0.96 01/15/2023    AST 16 01/07/2023    ALT 18 01/07/2023    MG 1.5 (L) 01/12/2023    APTT 62.7 (H) 01/15/2023    INR 1.1 08/23/2022      No results found for: COVID19    RADIOLOGY:   Images were personally reviewed including:   Cerebral angiogram 7/3/2019:  1. Right proximal immediately after the origin cervical ICA atherosclerotic changes resulting into a focal area of stenosis measuring approximately 65% per NASCET criteria. 2.  The above mentioned stenosis was treated with post balloon angioplasty and stenting using a 7-10x 30 mm Acculink stent with no significant residual stenosis. 3.  Right common femoral artery severe stenosis with good collaterals. CTA head neck 1/7/23: R ICA Acculink stent patent and L ICA chronic occlusion seen before 2019. Dominant R VA and chronically occluded L VA       MRI brain w/o contrast 1/8/23: punctate right temporal periventricular stroke. 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  Patient presented to the ED with AMS and found to have glucose of 620 and possible sepsis. Endovascular consulted for chronic L ICA and L VA occlusion and hx of R ICA stent in 2019. Neuro exam was stable  PLAN:   - no intervention, CTA is stable and L ICA and L VA occlusion are chronic and known before 2019  - R ICA stent is patent  - patient has a punctate right temporal periventricular stroke, he is currently already on aspirin, recommend continue it  - patient is on heparin for ACS/Left ventricle thrombus, cardiology and ICU managing it  - Vascular surgery on board for left subclavian stenosis  - ICU management   - PT OT and SLP  - F/up with Neuro endovascular clinic in 6 months        Case discussed with Dr. Gael Rock attending.     Abril Gold MD  Stroke, [facility]  [id number]  Electronically signed 1/15/2023 at 7:49 AM

## 2023-01-15 NOTE — PROGRESS NOTES
[address] [facility]  Progress Note    Date:   1/15/2023  Patient name:  Marce Proctor  Date of admission:  1/7/2023  4:22 PM  MRN:   [id number]  YOB: 1965    SUBJECTIVE/Last 24 hours update:     Patient was seen and examined bedside. No acute events overnight. Afebrile. VSS.  K3.3. We will replace. WBC downtrending 9.2. Blood cultures NGTD. Patient is oriented to person and place but not time. Patient states that it is the year 2012. He is easily redirectable. He denies any fever, chills, headaches, dizziness, chest pain, SOB, palpitations, abdominal pain, nausea, or vomiting. Notes from nursing staff and Consults had been reviewed, and the overnight progress had been checked with the nursing staff as well. HPI:     Patient is a 62 yo male admitted on 1/7/23 for AMS. Last well known was 1/5/23. Patient was found to be disheveled and having poor intake. In the ER patient was found to be hypertensive and elevated blood glucose levels (concern for DKA)  EKG showed STEMI, cardiology recommended starting the patient on Heparin gtt  CTA head showed severe stenosis in the left subclavian artery 2/2 noncalcified atheroscleotic plaque at the level of left vertebral artery. Patient was transferred to ICU for Hypertensive emergency and DKA management     In the ICU patient was assessed by cardiology and neurology and underwent cardiac cath which showed significant multivessel CAD, and patent grafts. Cardiology recommended continued medical therapy. Patient received Nitro drip for HTN emergency and was weaned off  Endovascular surgery was consulted for stenosis of subclavian artery but no intervention was recommended by them. Neurology was consulted for AMS did MRI brain which showed tiny acute infarcts in R temporal region per neurology not the cause of encephalopathy and so LTME was done which did not show electrographic seizures. REVIEW OF SYSTEMS:      Review of Systems   Constitutional:  Negative for activity change, appetite change and fatigue. HENT:  Negative for congestion. Eyes:  Negative for photophobia and visual disturbance. Respiratory:  Negative for apnea, cough, shortness of breath and wheezing. Cardiovascular:  Negative for chest pain, palpitations and leg swelling. Gastrointestinal:  Negative for abdominal distention, abdominal pain, constipation, diarrhea, nausea and vomiting. Endocrine: Negative for polyuria. Genitourinary:  Negative for difficulty urinating and urgency. Musculoskeletal:  Negative for arthralgias and back pain. Skin:  Negative for color change. Neurological:  Negative for dizziness, syncope and headaches. Psychiatric/Behavioral:  Positive for confusion. Negative for agitation, behavioral problems, decreased concentration and dysphoric mood. PAST MEDICAL HISTORY:      has a past medical history of CAD (coronary artery disease), Cardiac arrest ([facility] [address] Diabetes mellitus ([facility] [address] Hyperlipidemia, Hypertension, Osteolysis, PEA (Pulseless electrical activity) ([facility] [address] PVD (peripheral vascular disease) ([facility] [address] and Wound, open. PAST SURGICAL HISTORY:      has a past surgical history that includes Femur fracture surgery (Right, 2000); Cardiac catheterization (04/07/2019); Femur Surgery; Tonsillectomy (1974); Coronary artery bypass graft (04/07/2019); Hardware Removal (Right, 04/07/2019); Tibia fracture surgery (Right, 2000); Coronary artery bypass graft (N/A, 04/07/2019); RECONSTRUCTIVE REPAIR STERNAL (N/A, 04/08/2019); Hardware Removal (Right, 04/22/2019); REMOVE HARDWARE FEMUR (N/A, 04/22/2019); Carotid stent placement (07/03/2019); Leg amputation below knee (Right, 08/25/2019); Leg amputation below knee (Right, 08/27/2019); above knee amputation (Right, 09/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        SOCIAL HISTORY: reports that he has been smoking cigarettes. He started smoking about 2 years ago. He has a 20.00 pack-year smoking history. He has never used smokeless tobacco. He reports that he does not drink alcohol and does not use drugs. FAMILY HISTORY:     family history includes Diabetes in his brother, father, and mother; Heart Disease in his brother, father, and mother; High Blood Pressure in his brother, father, and sister; Stroke in his father and mother. HOME MEDICATIONS:      Prior to Admission medications    Medication Sig Start Date End Date Taking? Authorizing Provider   metoprolol tartrate (LOPRESSOR) 25 MG tablet Take 2 tablets by mouth 2 times daily 8/24/22   Flavio Barnes MD   clopidogrel (PLAVIX) 75 MG tablet TAKE 1 TABLET BY MOUTH DAILY 2/17/22   Emely Dawn MD   gabapentin (NEURONTIN) 300 MG capsule TAKE 1 CAPSULE BY MOUTH EVERY 8 HOURS 2/17/22 3/19/22  Emely Dawn MD   metFORMIN (GLUCOPHAGE) 1000 MG tablet Take 1 tablet by mouth 2 times daily (with meals) 1/13/22 3/14/22  Emely Dawn MD   insulin lispro, 1 Unit Dial, (HUMALOG KWIKPEN) 100 UNIT/ML SOPN INJECT SUBCUTANEOUSLY BEFORE MEALS AS DIRECTED PER SLIDING SCALE: SEE ATTACHED FOR SLIDING SCALE DIRECTIONS: Hold all insulin and notify Dr immediately if BG <60. -200: 2 UNITS; -250: 4UNITS; 251-300 6 UNITS; 301-350: 8 UNITS  Patient not taking: Reported on 1/13/2022 1/5/22   Emely Dawn MD   lisinopril-hydroCHLOROthiazide (PRINZIDE;ZESTORETIC) 20-25 MG per tablet Take 2 tablets by mouth daily .   Take 1 pill if blood pressure is less than 130/80 12/23/21   Emely Dawn MD   insulin glargine (LANTUS SOLOSTAR) 100 UNIT/ML injection pen Inject 24 Units into the skin nightly 11/9/21 1/13/22  Marianela Roque MD   Insulin Syringe-Needle U-100 30G X 5/16\" 1 ML MISC 1 each by Does not apply route daily 11/9/21   Marianela Roque MD   atorvastatin (LIPITOR) 40 MG tablet Take 1 tablet by mouth nightly 10/19/21   Lorrie Rose MD   metoprolol tartrate (LOPRESSOR) 50 MG tablet Take 1 tablet by mouth 2 times daily 10/19/21   Dora Pretty MD   blood glucose test strips (ONETOUCH VERIO) strip TEST 2 TIMES A DAY & AS NEEDED FOR SYMPTOMS OF IRREGULAR BLOOD GLUCOSE. 5/28/21   Teddie Schirmer, MD   Alcohol Swabs (B-D SINGLE USE SWABS REGULAR) PADS USE AS DIRECTED WITH TESTING SUPPLIES 5/28/21   Teddie Schirmer, MD   Lancets (ONETOUCH DELICA PLUS [id number]) MISC USE AS DIRECTED TO TEST BLOOD SUGAR 5/28/21   Teddie Schirmer, MD   sildenafil (VIAGRA) 100 MG tablet Take 1 tablet by mouth as needed for Erectile Dysfunction 1/7/21   Teddie Schirmer, MD   Blood Glucose Monitoring Suppl (ONE TOUCH ULTRA 2) w/Device KIT U UTD ONCE D 9/27/19   Historical MD Steph   acetaminophen (TYLENOL) 500 MG tablet Take 2 tablets by mouth every 8 hours as needed for Pain 9/30/19   Richard Richardson DO   [address] by Does not apply route For 5 years 9/27/19   Teddie Schirmer, MD   Blood Glucose Monitoring Suppl (ACURA BLOOD GLUCOSE METER) w/Device KIT 1 kit by Does not apply route once for 1 dose 9/27/19 9/27/19  Teddie Schirmer, MD   Insulin Pen Needle 32G X 6 MM MISC 1 each by Does not apply route daily 9/27/19   Teddie Schirmer, MD   furosemide (LASIX) 20 MG tablet Take 1 tablet by mouth daily  Patient not taking: Reported on 11/16/2021 7/4/19   ISAAC Lazo - CNP   aspirin 81 MG chewable tablet Take 1 tablet by mouth daily 5/10/19   Kiah Diaz MD   Multiple Vitamins-Minerals (THERAPEUTIC MULTIVITAMIN-MINERALS) tablet Take 1 tablet by mouth daily (with breakfast) 5/10/19   Kiah Diaz MD       ALLERGIES:     Patient has no known allergies.       OBJECTIVE:       Vitals:    01/14/23 2000 01/15/23 0009 01/15/23 0456 01/15/23 0815   BP: (!) 117/58 119/64 137/77 (!) 144/125   Pulse: 94 89 87 81   Resp: 16 24 20 24   Temp: 98.4 °F (36.9 °C) 98.5 °F (36.9 °C) 98.1 °F (36.7 °C) 97.5 °F (36.4 °C)   TempSrc: Oral Oral Axillary Oral   SpO2: 94% 90% 93% 97%   Weight:   143 lb 1.3 oz (64.9 kg)    Height:             Intake/Output Summary (Last 24 hours) at 1/15/2023 1033  Last data filed at 1/15/2023 0538  Gross per 24 hour   Intake 1297.9 ml   Output 1000 ml   Net 297.9 ml       PHYSICAL EXAM:  Physical Exam  Constitutional:       General: He is not in acute distress. Appearance: Normal appearance. He is not ill-appearing. HENT:      Head: Normocephalic and atraumatic. Eyes:      Extraocular Movements: Extraocular movements intact. Cardiovascular:      Rate and Rhythm: Normal rate and regular rhythm. Pulses: Normal pulses. Pulmonary:      Effort: Pulmonary effort is normal. No respiratory distress. Breath sounds: No wheezing, rhonchi or rales. Abdominal:      General: Bowel sounds are normal. There is no distension. Palpations: Abdomen is soft. Tenderness: There is no abdominal tenderness. There is no guarding. Genitourinary:     Comments: Tate in place  Musculoskeletal:      Comments: S/p right AKA   Skin:     General: Skin is warm. Neurological:      Mental Status: He is alert. Comments: Oriented to person, place, but not time        DIAGNOSTICS:     Laboratory Testing:    Recent Results (from the past 24 hour(s))   Culture, Blood 1    Collection Time: 01/14/23 10:48 AM    Specimen: Blood   Result Value Ref Range    Specimen Description . BLOOD     Special Requests RT HAND 10ML     Culture NO GROWTH 12 HOURS    Culture, Blood 1    Collection Time: 01/14/23 10:53 AM    Specimen: Blood   Result Value Ref Range    Specimen Description . BLOOD     Special Requests RT ARM 10ML     Culture NO GROWTH 12 HOURS    POC Glucose Fingerstick    Collection Time: 01/14/23  1:04 PM   Result Value Ref Range    POC Glucose 190 (H) 75 - 110 mg/dL   Urinalysis with Reflex to Culture    Collection Time: 01/14/23  1:57 PM    Specimen: Urine   Result Value Ref Range    Color, UA Yellow Yellow    Turbidity UA Clear Clear    Glucose, Ur NEGATIVE NEGATIVE    Bilirubin Urine NEGATIVE NEGATIVE    Ketones, Urine TRACE (A) NEGATIVE    Specific Gravity, UA 1.021 1.005 - 1.030    Urine Hgb NEGATIVE NEGATIVE    pH, UA 6.0 5.0 - 8.0    Protein, UA TRACE (A) NEGATIVE    Urobilinogen, Urine Normal Normal    Nitrite, Urine NEGATIVE NEGATIVE    Leukocyte Esterase, Urine NEGATIVE NEGATIVE   Microscopic Urinalysis    Collection Time: 01/14/23  1:57 PM   Result Value Ref Range    WBC, UA 0 TO 2 0 - 5 /HPF    RBC, UA 2 TO 5 0 - 2 /HPF    Casts UA 2 TO 5 0 - 2 /LPF    Casts UA HYALINE 0 - 2 /LPF    Epithelial Cells UA None 0 - 5 /HPF    Yeast, UA FEW (A) None   APTT    Collection Time: 01/14/23  4:26 PM   Result Value Ref Range    PTT 74.1 (H) 20.5 - 30.5 sec   POC Glucose Fingerstick    Collection Time: 01/14/23  8:04 PM   Result Value Ref Range    POC Glucose 246 (H) 75 - 110 mg/dL   APTT    Collection Time: 01/14/23 11:05 PM   Result Value Ref Range    PTT 54.6 (H) 20.5 - 30.5 sec   POC Glucose Fingerstick    Collection Time: 01/15/23  1:19 AM   Result Value Ref Range    POC Glucose 201 (H) 75 - 110 mg/dL   CBC with Auto Differential    Collection Time: 01/15/23  5:48 AM   Result Value Ref Range    WBC 9.2 3.5 - 11.3 k/uL    RBC 4.83 4.21 - 5.77 m/uL    Hemoglobin 14.2 13.0 - 17.0 g/dL    Hematocrit 43.5 40.7 - 50.3 %    MCV 90.1 82.6 - 102.9 fL    MCH 29.4 25.2 - 33.5 pg    MCHC 32.6 28.4 - 34.8 g/dL    RDW 13.3 11.8 - 14.4 %    Platelets 363 422 - 072 k/uL    MPV 11.2 8.1 - 13.5 fL    NRBC Automated 0.0 0.0 per 100 WBC    Seg Neutrophils 49 36 - 65 %    Lymphocytes 36 24 - 43 %    Monocytes 10 3 - 12 %    Eosinophils % 4 1 - 4 %    Basophils 0 0 - 2 %    Immature Granulocytes 0 0 %    Segs Absolute 4.49 1.50 - 8.10 k/uL    Absolute Lymph # 3.32 1.10 - 3.70 k/uL    Absolute Mono # 0.96 0.10 - 1.20 k/uL    Absolute Eos # 0.39 0.00 - 0.44 k/uL    Basophils Absolute 0.03 0.00 - 0.20 k/uL    Absolute Immature Granulocyte 0.03 0.00 - 0.30 k/uL   Basic Metabolic Panel    Collection Time: 01/15/23  5:48 AM   Result Value Ref Range    Glucose 179 (H) 70 - 99 mg/dL    BUN 12 6 - 20 mg/dL    Creatinine 0.96 0.70 - 1.20 mg/dL    Est, Glom Filt Rate >60 >60 mL/min/1.73m2    Calcium 8.6 8.6 - 10.4 mg/dL    Sodium 136 135 - 144 mmol/L    Potassium 3.3 (L) 3.7 - 5.3 mmol/L    Chloride 101 98 - 107 mmol/L    CO2 25 20 - 31 mmol/L    Anion Gap 10 9 - 17 mmol/L   APTT    Collection Time: 01/15/23  5:48 AM   Result Value Ref Range    PTT 62.7 (H) 20.5 - 30.5 sec         Imaging/Diagonstics:    Current Facility-Administered Medications   Medication Dose Route Frequency Provider Last Rate Last Admin    nicotine (NICODERM CQ) 14 MG/24HR 1 patch  1 patch TransDERmal Daily Dieter Staley MD   1 patch at 01/15/23 0852    atorvastatin (LIPITOR) tablet 80 mg  80 mg Oral Nightly Shahram Harris MD   80 mg at 01/14/23 2028    [Held by provider] lacosamide (VIMPAT) tablet 100 mg  100 mg Oral BID ISAAC Saleh CNP        [Held by provider] levETIRAcetam (KEPPRA) tablet 500 mg  500 mg Oral BID ISAAC Saleh CNP        lacosamide (VIMPAT) 100 mg in sodium chloride 0.9 % 60 mL IVPB  100 mg IntraVENous BID Nadiya Showman, DO   Stopped at 01/15/23 0941    levETIRAcetam (KEPPRA) 500 mg/100 mL IVPB  500 mg IntraVENous Q12H Sesser Showman, DO   Stopped at 01/15/23 0000    carvedilol (COREG) tablet 12.5 mg  12.5 mg Oral BID  Oswaldo Javier, DO   12.5 mg at 01/15/23 0851    lisinopril (PRINIVIL;ZESTRIL) tablet 20 mg  20 mg Oral Daily Oswaldo Javier, DO   20 mg at 01/15/23 0851    furosemide (LASIX) injection 40 mg  40 mg IntraVENous BID Ghassan Jon MD   40 mg at 01/15/23 0851    insulin lispro (HUMALOG) injection vial 0-8 Units  0-8 Units SubCUTAneous Q6H Nadiya Showman, DO   2 Units at 01/15/23 0901    bisacodyl (DULCOLAX) EC tablet 5 mg  5 mg Oral Daily PRN Farhat Parks MD        heparin 25,000 units in dextrose 5 % 250 mL infusion (rate based)  5-30 Units/kg/hr IntraVENous Continuous Adilson Lin MD 16.3 mL/hr at 01/15/23 0836 24 Units/kg/hr at 01/15/23 0836    glucose chewable tablet 16 g  4 tablet Oral PRN Svetlana Keenan MD        dextrose bolus 10% 125 mL  125 mL IntraVENous PRN Svetlana Keenan MD        Or    dextrose bolus 10% 250 mL  250 mL IntraVENous PRN Svetlana Keenan MD        glucagon (rDNA) injection 1 mg  1 mg SubCUTAneous PRN Svetlana Keenan MD        dextrose 10 % infusion   IntraVENous Continuous PRN Svetlana Keenan MD        famotidine (PEPCID) 20 mg in sodium chloride (PF) 0.9 % 10 mL injection  20 mg IntraVENous BID Svetlana Keenan MD   20 mg at 01/15/23 0851    heparin (porcine) injection 4,000 Units  4,000 Units IntraVENous PRN Sylvester Brink MD   4,000 Units at 01/10/23 1538    heparin (porcine) injection 2,000 Units  2,000 Units IntraVENous PRN Sylvester Brink MD   2,000 Units at 01/14/23 5345    aspirin chewable tablet 81 mg  81 mg Oral Daily Sylvester Brink MD   81 mg at 01/15/23 0851    therapeutic multivitamin-minerals 1 tablet  1 tablet Oral Daily with breakfast Sylvester Brink MD   1 tablet at 01/15/23 0851    dextrose bolus 10% 125 mL  125 mL IntraVENous PRN Sylvester Brink MD   Stopped at 01/10/23 1139    Or    dextrose bolus 10% 250 mL  250 mL IntraVENous PRN Sylvester Brink MD        potassium chloride 10 mEq/100 mL IVPB (Peripheral Line)  10 mEq IntraVENous PRN Sylvester Brink  mL/hr at 01/13/23 1013 10 mEq at 01/13/23 1013    magnesium sulfate 1000 mg in dextrose 5% 100 mL IVPB  1,000 mg IntraVENous PRN Sylvester Brink MD   Stopped at 01/12/23 1752    sodium phosphate 10 mmol in sodium chloride 0.9 % 250 mL IVPB  10 mmol IntraVENous PRN Sylvester Brink MD   Stopped at 01/10/23 1327    Or    sodium phosphate 15 mmol in dextrose 5 % 250 mL IVPB  15 mmol IntraVENous PRN Sylvester Brink MD   Stopped at 01/08/23 1315    Or    sodium phosphate 20 mmol in dextrose 5 % 500 mL IVPB  20 mmol IntraVENous PRN Sylvester Brink MD        sodium chloride flush 0.9 % injection 5-40 mL  5-40 mL IntraVENous 2 times per day Gume Rivera MD   10 mL at 01/15/23 0851    sodium chloride flush 0.9 % injection 5-40 mL  5-40 mL IntraVENous PRN Gume Rivera MD        0.9 % sodium chloride infusion   IntraVENous PRN Gume Rivera MD   Stopped at 01/14/23 1603    ondansetron (ZOFRAN-ODT) disintegrating tablet 4 mg  4 mg Oral Q8H PRN Gume Rivera MD        Or    ondansetron (ZOFRAN) injection 4 mg  4 mg IntraVENous Q6H PRN Gume Rivera MD        polyethylene glycol (GLYCOLAX) packet 17 g  17 g Oral Daily PRN Gume Rievra MD        acetaminophen (TYLENOL) tablet 650 mg  650 mg Oral Q6H PRN Gume Rivera MD        Or    acetaminophen (TYLENOL) suppository 650 mg  650 mg Rectal Q6H PRN Gume Rivera MD           ASSESSMENT:     Principal Problem:    Altered mental status  Active Problems:    DKA, type 2, not at goal [facility])    Stenosis of right middle cerebral artery    At risk of seizures    Cardiomyopathy, ischemic    Acute ischemic right MCA stroke ([facility] [address]    Acute metabolic encephalopathy    Cerebral multi-infarct state  Resolved Problems:    * No resolved hospital problems.  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   -Aspirin 81 mg daily and Lipitor 80 mg nightly  -On Heparin Gtt  -Diet: NPO midnight  -Endovascular on board:   -no intervention, CTA is stable and L ICA and L VA occlusion are chronic and known before 2019   -F/u neuro endovascular clinic in 6 months  -Vascular on board:   -Plan for LUE angiogram with subclavian artery stent placement versus angioplasty -keep patient n.p.o. at midnight     Left ventricle apical thrombus 1.4 cm X0 0.6 cm, LVEF 35-40%, MV-CAD s/p PEA arrest, s/p CABG 2019  -ECHO 1/9: Left ventricle apical thrombus- On Heparin Gtt   -Will need to be transition to [facility] at discharge  -Cardiology on board: -S/p cardiac cath 1/13   -Continue heparin for LV thrombus, IV Lasix 40 mg twice daily, Coreg 12.5 mg twice daily, aspirin 81 mg daily, statin 40 mg nightly, lisinopril 20 mg daily    -Cardiac cath done 1/13 showed multivessel CAD              -OM1 has high origin with ostial 90% stenosis              -LCx: Small caliber vessel. Proximal and distal 70% stenosis              -99% stenosis at proximal left subclavian artery at origin of LIMA, leading to its underfilling.       Systolic CHF  -EF 40-67%, moderate reduction in LV systolic function  -Continue IV lasix 40 mg BID, Coreg 12.5 mg BID, lisinopril 20 mg QD  -Cardiology on board     Leukocytosis (improving)  -CRP and procal pending  -WBC 9.2  -On IV rocephin  -Urine Culture negative 1/14  -Blood cultures and NGTD x 5 days  -Repeat blood cultures negative    T2DM  -Medium dose sliding scale  -Hypoglycemia protocol in place     AMS   -Neurology on board:   -No driving for 6 months   -Continue Keppra and Vimpat on discharge   -Signed off  -MRI brain showed tiny acute infarct   -LTME no seizure activty  -Continue ASA 81 mg QD, Lipitor 80 mg nightly  -Continue IV Keppra 500 mg BID and Vimpat 100 mg BID       Hypertension  -On coreg and Lisinopril     Gi Ppx: IV Pepcid 20 mg twice daily  DVT Ppx: On heparin ip    Ellie Dominguez MD  Family Medicine Resident  1/15/2023 10:33 AM

## 2023-01-16 LAB
ABSOLUTE EOS #: 0.36 K/UL (ref 0–0.44)
ABSOLUTE IMMATURE GRANULOCYTE: 0.04 K/UL (ref 0–0.3)
ABSOLUTE LYMPH #: 3.72 K/UL (ref 1.1–3.7)
ABSOLUTE MONO #: 1.21 K/UL (ref 0.1–1.2)
ANION GAP SERPL CALCULATED.3IONS-SCNC: 10 MMOL/L (ref 9–17)
BASOPHILS # BLD: 1 % (ref 0–2)
BASOPHILS ABSOLUTE: 0.07 K/UL (ref 0–0.2)
BUN BLDV-MCNC: 9 MG/DL (ref 6–20)
CALCIUM SERPL-MCNC: 8.8 MG/DL (ref 8.6–10.4)
CHLORIDE BLD-SCNC: 100 MMOL/L (ref 98–107)
CO2: 27 MMOL/L (ref 20–31)
CREAT SERPL-MCNC: 0.92 MG/DL (ref 0.7–1.2)
EOSINOPHILS RELATIVE PERCENT: 3 % (ref 1–4)
GFR SERPL CREATININE-BSD FRML MDRD: >60 ML/MIN/1.73M2
GLUCOSE BLD-MCNC: 104 MG/DL (ref 75–110)
GLUCOSE BLD-MCNC: 132 MG/DL (ref 75–110)
GLUCOSE BLD-MCNC: 138 MG/DL (ref 75–110)
GLUCOSE BLD-MCNC: 161 MG/DL (ref 75–110)
GLUCOSE BLD-MCNC: 235 MG/DL (ref 75–110)
GLUCOSE BLD-MCNC: 254 MG/DL (ref 75–110)
GLUCOSE BLD-MCNC: 71 MG/DL (ref 70–99)
HCT VFR BLD CALC: 44.6 % (ref 40.7–50.3)
HEMOGLOBIN: 14.4 G/DL (ref 13–17)
IMMATURE GRANULOCYTES: 0 %
LYMPHOCYTES # BLD: 34 % (ref 24–43)
MAGNESIUM: 1.9 MG/DL (ref 1.6–2.6)
MCH RBC QN AUTO: 29.3 PG (ref 25.2–33.5)
MCHC RBC AUTO-ENTMCNC: 32.3 G/DL (ref 28.4–34.8)
MCV RBC AUTO: 90.8 FL (ref 82.6–102.9)
MONOCYTES # BLD: 11 % (ref 3–12)
NRBC AUTOMATED: 0 PER 100 WBC
PARTIAL THROMBOPLASTIN TIME: 65.7 SEC (ref 20.5–30.5)
PDW BLD-RTO: 13.2 % (ref 11.8–14.4)
PLATELET # BLD: 358 K/UL (ref 138–453)
PMV BLD AUTO: 10.8 FL (ref 8.1–13.5)
POTASSIUM SERPL-SCNC: 2.9 MMOL/L (ref 3.7–5.3)
POTASSIUM SERPL-SCNC: 4 MMOL/L (ref 3.7–5.3)
RBC # BLD: 4.91 M/UL (ref 4.21–5.77)
SEG NEUTROPHILS: 51 % (ref 36–65)
SEGMENTED NEUTROPHILS ABSOLUTE COUNT: 5.61 K/UL (ref 1.5–8.1)
SODIUM BLD-SCNC: 137 MMOL/L (ref 135–144)
WBC # BLD: 11 K/UL (ref 3.5–11.3)

## 2023-01-16 PROCEDURE — 6370000000 HC RX 637 (ALT 250 FOR IP): Performed by: STUDENT IN AN ORGANIZED HEALTH CARE EDUCATION/TRAINING PROGRAM

## 2023-01-16 PROCEDURE — 10701607 HC NON-CHARGEABLE SUPPLY

## 2023-01-16 PROCEDURE — 84132 ASSAY OF SERUM POTASSIUM: CPT

## 2023-01-16 PROCEDURE — 10701607 HC MISC INTRODUCER/SHEATH

## 2023-01-16 PROCEDURE — 83735 ASSAY OF MAGNESIUM: CPT

## 2023-01-16 PROCEDURE — 6360000002 HC RX W HCPCS

## 2023-01-16 PROCEDURE — C1894 INTRO/SHEATH, NON-LASER: HCPCS

## 2023-01-16 PROCEDURE — 6360000002 HC RX W HCPCS: Performed by: STUDENT IN AN ORGANIZED HEALTH CARE EDUCATION/TRAINING PROGRAM

## 2023-01-16 PROCEDURE — 99221 1ST HOSP IP/OBS SF/LOW 40: CPT | Performed by: FAMILY MEDICINE

## 2023-01-16 PROCEDURE — 6370000000 HC RX 637 (ALT 250 FOR IP)

## 2023-01-16 PROCEDURE — C1887 CATHETER, GUIDING: HCPCS

## 2023-01-16 PROCEDURE — 03743DZ DILATION OF LEFT SUBCLAVIAN ARTERY WITH INTRALUMINAL DEVICE, PERCUTANEOUS APPROACH: ICD-10-PCS | Performed by: SURGERY

## 2023-01-16 PROCEDURE — 6370000000 HC RX 637 (ALT 250 FOR IP): Performed by: INTERNAL MEDICINE

## 2023-01-16 PROCEDURE — 10701607 HC MISC CATH TRANSLUM ANGIO

## 2023-01-16 PROCEDURE — 2060000000 HC ICU INTERMEDIATE R&B

## 2023-01-16 PROCEDURE — C1760 CLOSURE DEV, VASC: HCPCS

## 2023-01-16 PROCEDURE — C1769 GUIDE WIRE: HCPCS

## 2023-01-16 PROCEDURE — 82947 ASSAY GLUCOSE BLOOD QUANT: CPT

## 2023-01-16 PROCEDURE — 2709999900 HC NON-CHARGEABLE SUPPLY

## 2023-01-16 PROCEDURE — 85025 COMPLETE CBC W/AUTO DIFF WBC: CPT

## 2023-01-16 PROCEDURE — 2580000003 HC RX 258

## 2023-01-16 PROCEDURE — 10701607 HC CATHETER GUIDING

## 2023-01-16 PROCEDURE — 6370000000 HC RX 637 (ALT 250 FOR IP): Performed by: NURSE PRACTITIONER

## 2023-01-16 PROCEDURE — 37236 OPEN/PERQ PLACE STENT 1ST: CPT

## 2023-01-16 PROCEDURE — 6360000004 HC RX CONTRAST MEDICATION

## 2023-01-16 PROCEDURE — 80048 BASIC METABOLIC PNL TOTAL CA: CPT

## 2023-01-16 PROCEDURE — 36215 PLACE CATHETER IN ARTERY: CPT

## 2023-01-16 PROCEDURE — 10701607 HC MISC INTRODUCER PEEL-AWAY

## 2023-01-16 PROCEDURE — 10701607 HC MISC GUIDEWIRE

## 2023-01-16 PROCEDURE — C1725 CATH, TRANSLUMIN NON-LASER: HCPCS

## 2023-01-16 PROCEDURE — 85730 THROMBOPLASTIN TIME PARTIAL: CPT

## 2023-01-16 PROCEDURE — C1876 STENT, NON-COA/NON-COV W/DEL: HCPCS

## 2023-01-16 PROCEDURE — C1892 INTRO/SHEATH,FIXED,PEEL-AWAY: HCPCS

## 2023-01-16 PROCEDURE — 2580000003 HC RX 258: Performed by: STUDENT IN AN ORGANIZED HEALTH CARE EDUCATION/TRAINING PROGRAM

## 2023-01-16 PROCEDURE — 36415 COLL VENOUS BLD VENIPUNCTURE: CPT

## 2023-01-16 PROCEDURE — 10701607 HC MISC CLOSURE DEVICE VASC IMPLANTABLE/INSERTABLE

## 2023-01-16 PROCEDURE — 10701607 HC MISC STENT NONCTD/CVD W DEL SYS

## 2023-01-16 RX ORDER — AMLODIPINE BESYLATE 5 MG/1
5 TABLET ORAL DAILY
Status: DISCONTINUED | OUTPATIENT
Start: 2023-01-16 | End: 2023-01-19

## 2023-01-16 RX ORDER — ACETAMINOPHEN 325 MG/1
650 TABLET ORAL EVERY 4 HOURS PRN
Status: DISCONTINUED | OUTPATIENT
Start: 2023-01-16 | End: 2023-01-22 | Stop reason: HOSPADM

## 2023-01-16 RX ORDER — SODIUM CHLORIDE, SODIUM LACTATE, POTASSIUM CHLORIDE, CALCIUM CHLORIDE 600; 310; 30; 20 MG/100ML; MG/100ML; MG/100ML; MG/100ML
INJECTION, SOLUTION INTRAVENOUS CONTINUOUS
Status: DISCONTINUED | OUTPATIENT
Start: 2023-01-16 | End: 2023-01-20

## 2023-01-16 RX ADMIN — FUROSEMIDE 40 MG: 10 INJECTION, SOLUTION INTRAMUSCULAR; INTRAVENOUS at 16:40

## 2023-01-16 RX ADMIN — POTASSIUM CHLORIDE 10 MEQ: 7.46 INJECTION, SOLUTION INTRAVENOUS at 10:29

## 2023-01-16 RX ADMIN — HEPARIN SODIUM 24 UNITS/KG/HR: 10000 INJECTION, SOLUTION INTRAVENOUS at 16:32

## 2023-01-16 RX ADMIN — LEVETIRACETAM 500 MG: 500 TABLET, FILM COATED ORAL at 21:09

## 2023-01-16 RX ADMIN — FUROSEMIDE 40 MG: 10 INJECTION, SOLUTION INTRAMUSCULAR; INTRAVENOUS at 09:31

## 2023-01-16 RX ADMIN — LISINOPRIL 20 MG: 20 TABLET ORAL at 09:30

## 2023-01-16 RX ADMIN — CARVEDILOL 12.5 MG: 12.5 TABLET, FILM COATED ORAL at 16:40

## 2023-01-16 RX ADMIN — Medication 1 TABLET: at 09:32

## 2023-01-16 RX ADMIN — ATORVASTATIN CALCIUM 80 MG: 80 TABLET, FILM COATED ORAL at 21:09

## 2023-01-16 RX ADMIN — CARVEDILOL 12.5 MG: 12.5 TABLET, FILM COATED ORAL at 09:30

## 2023-01-16 RX ADMIN — AMLODIPINE BESYLATE 5 MG: 5 TABLET ORAL at 11:08

## 2023-01-16 RX ADMIN — FAMOTIDINE 20 MG: 20 TABLET, FILM COATED ORAL at 21:09

## 2023-01-16 RX ADMIN — LACOSAMIDE 100 MG: 100 TABLET, FILM COATED ORAL at 09:30

## 2023-01-16 RX ADMIN — INSULIN LISPRO 4 UNITS: 100 INJECTION, SOLUTION INTRAVENOUS; SUBCUTANEOUS at 02:44

## 2023-01-16 RX ADMIN — POTASSIUM CHLORIDE 10 MEQ: 7.46 INJECTION, SOLUTION INTRAVENOUS at 11:42

## 2023-01-16 RX ADMIN — FAMOTIDINE 20 MG: 20 TABLET, FILM COATED ORAL at 09:30

## 2023-01-16 RX ADMIN — INSULIN GLARGINE 10 UNITS: 100 INJECTION, SOLUTION SUBCUTANEOUS at 21:09

## 2023-01-16 RX ADMIN — ASPIRIN 81 MG: 81 TABLET, CHEWABLE ORAL at 09:31

## 2023-01-16 RX ADMIN — LACOSAMIDE 100 MG: 100 TABLET, FILM COATED ORAL at 21:09

## 2023-01-16 RX ADMIN — SODIUM CHLORIDE, PRESERVATIVE FREE 10 ML: 5 INJECTION INTRAVENOUS at 09:31

## 2023-01-16 RX ADMIN — LEVETIRACETAM 500 MG: 500 TABLET, FILM COATED ORAL at 09:30

## 2023-01-16 RX ADMIN — POTASSIUM CHLORIDE 10 MEQ: 7.46 INJECTION, SOLUTION INTRAVENOUS at 08:00

## 2023-01-16 RX ADMIN — SODIUM CHLORIDE, POTASSIUM CHLORIDE, SODIUM LACTATE AND CALCIUM CHLORIDE: 600; 310; 30; 20 INJECTION, SOLUTION INTRAVENOUS at 22:20

## 2023-01-16 RX ADMIN — SODIUM CHLORIDE, POTASSIUM CHLORIDE, SODIUM LACTATE AND CALCIUM CHLORIDE: 600; 310; 30; 20 INJECTION, SOLUTION INTRAVENOUS at 10:26

## 2023-01-16 RX ADMIN — POTASSIUM CHLORIDE 10 MEQ: 7.46 INJECTION, SOLUTION INTRAVENOUS at 09:17

## 2023-01-16 ASSESSMENT — LIFESTYLE VARIABLES: SMOKING_STATUS: 1

## 2023-01-16 NOTE — PROGRESS NOTES
Division of Vascular Surgery             Progress Note      Name: Teresa Reynolds  MRN: [id number]         Overnight Events:     None      Subjective:     Patient seen and examined this morning, no overnight events. Patient remains VSS, afebrile. On room air. Good UOP. Sleeping comfortably this morning. Continues to deny chest pain this morning. Pleasant. Physical Exam:     Vitals:  BP (!) 151/90   Pulse 78   Temp 98 °F (36.7 °C)   Resp 20   Ht 5' 5\" (1.651 m)   Wt 138 lb 10.7 oz (62.9 kg)   SpO2 92%   BMI 23.08 kg/m²     Physical Exam:  Vital signs and Nurse's note reviewed  Gen:  Alert to self, no acute distress  HEENT: PERRLA, EOMI, no scleral icterus, oral mucosa moist  Neck: Supple  Chest: Symmetric rise with inhalation, no evidence of trauma  CVS: Regular rate and rhythm  Resp: Good bilateral air entry, no wheeze or rhonchi  Abd: soft, non-tender, non-distended, bowel sounds present. Ext:s/p  R AKA well healed, no open wounds. L DP/PT signal  CNS: Moves all extremities, no gross focal motor deficits  Skin: No erythema or ulcerations   Pulses: R> L radial signal, R palpable      Imaging:   No results found. Assessment:     Subclavian stenosis proximal to LIMA origin      Plan:     Examined the patient, stable exam  Plan for LUE angiogram with subclavian artery stent placement vs angioplasty today  The consent was reviewed with the patient and his son who is acting decision-maker. The procedure, alternatives, risks were discussed and all questions were answered. The consent was signed, witnessed, and placed in the patient's chart. Timing TBD, after 4pm  Npo at mdn      ----------------------------------------  [facility] MINDI Justiceuth: [phone]  C: [phone]

## 2023-01-16 NOTE — CARE COORDINATION
Spoke to Jeremy from [facility]. They denied patient previously d/t continuous bipap. Notified her that he is on room air and oriented and bedside sitter has been removed. She will re-eval. They will not have a bed available until later this week. Spoke to Janessa from [facility]. She will review

## 2023-01-16 NOTE — PROGRESS NOTES
[facility]   Progress Note                   Date:   1/16/2023  Patient name: Gisselle Crabtree  Date of admission:  1/7/2023  4:22 PM  MRN:   [id number]  YOB: 1965  PCP: Lupe Fenton MD    Reason for Admission:      Subjective:       Patient seen and examined in room. He denies chest pain or shortness of breath. Tele/vitals/labs reviewed. Discussed case with RN, hypertensive. K being replaced, on heparin gtt. Medications:   Scheduled Meds:   famotidine  20 mg Oral BID    insulin glargine  10 Units SubCUTAneous Nightly    nicotine  1 patch TransDERmal Daily    atorvastatin  80 mg Oral Nightly    lacosamide  100 mg Oral BID    levETIRAcetam  500 mg Oral BID    carvedilol  12.5 mg Oral BID WC    lisinopril  20 mg Oral Daily    furosemide  40 mg IntraVENous BID    insulin lispro  0-8 Units SubCUTAneous Q6H    aspirin  81 mg Oral Daily    therapeutic multivitamin-minerals  1 tablet Oral Daily with breakfast    sodium chloride flush  5-40 mL IntraVENous 2 times per day       Continuous Infusions:   lactated ringers      heparin (PORCINE) Infusion 24 Units/kg/hr (01/16/23 2301)    dextrose      sodium chloride Stopped (01/14/23 1603)       CBC:   Recent Labs     01/14/23  0212 01/15/23  0548 01/16/23  0624   WBC 14.3* 9.2 11.0   HGB 15.0 14.2 14.4    292 358       BMP:    Recent Labs     01/14/23  0212 01/15/23  0548 01/16/23  0624   * 136 137   K 3.9 3.3* 2.9*   CL 99 101 100   CO2 23 25 27   BUN 11 12 9   CREATININE 0.84 0.96 0.92   GLUCOSE 182* 179* 71       Hepatic: No results for input(s): AST, ALT, ALB, BILITOT, ALKPHOS in the last 72 hours. Troponin: No results for input(s): TROPONINI in the last 72 hours. BNP: No results for input(s): BNP in the last 72 hours. Lipids: No results for input(s): CHOL, HDL in the last 72 hours. Invalid input(s): LDLCALCU  INR: No results for input(s): INR in the last 72 hours.     Objective:   Vitals: BP (!) 177/90   Pulse 88   Temp 98 °F (36.7 °C) (Oral)   Resp 11   Ht 5' 5\" (1.651 m)   Wt 138 lb 10.7 oz (62.9 kg)   SpO2 92%   BMI 23.08 kg/m²     General appearance: awake, altered, in no apparent respiratory distress   HEENT: Head: Normocephalic, no lesions, without obvious abnormality  Neck: no JVD  Lungs: clear to auscultation bilaterally, no basilar rales, no wheezing   Heart: regular rate and rhythm, S1, S2 normal, no murmur, click, rub or gallop, right groin no hematoma or bleeding noted  Abdomen: soft, non-tender; bowel sounds normal, gamino in place. Extremities: Right AKA , no LLE edema  Neurologic: not done. EK/10/23  Sinus tachycardia, q wave in anterior leads    Echocardiogram:  23  Summary  Left ventricle is normal in size. Global left ventricular systolic function  is moderately reduced. Visually estimated EF 35-40%. Global hypokinesis  worse in the apex and all apical segments. Definity used to improve delineation of left ventricular endocardial border. LV apical thrombus is noted. Measures 1.4cm x 0.6cm. Evidence of diastolic dysfunction. Thickened mitral valve leaflets. Trivial mitral regurgitation. Coronary Angiography:   19   Procedure Summary      1. Multivessel coronary artery disease. 2. Severe LV systolic dysfunction. 3. Mild aortic insufficiency   4. Distal aorta 40% stenosis. 5. Occluded left external iliac artery. 6. Severe diffuse disease of right iliac artery. Unable to pass IABP or   Impella      Recommendations      Medical therapy as needed. Risk factor modification. Routine Post Diagnostic Cath orders. Urgent surgical CABG. Discussed with CT surgery. IV lasix. CATH Procedure Summary 2023        1. Multivessel CAD    2. Patent SVG-OM1/OM3 and SVG-RPDA grafts    3. Critical stenosis of left subclavian artery proximal to LIMA origin.     LIMA-LAD appears to be patent on left coronary angiogram.    4. Overall preserved LV systolic function Recommendations        Routine Post Diagnostic Cath orders. Medical therapy    Vascular surgery consult for consideration of subclavian artery stent      Electronically signed by Anny Mendoza MD on 1/13/2023 at 3:30 PM  Cardiovascular fellow  [facility]        Attending Physician  I was present during the entire procedure and performed all the critical elements of it. Kacey Cazares MD, [facility], [facility]                          Assessment / Acute Cardiac Problems:   H/o MVCAD s/p CABG [LIMA-LAD, SVG-PDA,OM 1 & distal circumflex) 4/7/2019  Ischemic cardiomyopathy,LVEF Improved on last echo >65%  LV thrombus 1/4 x 0.6 cm  Diabetes type II, in DKA - resolved  H/o CVA  ALMITA  Left subclavian stenosis  Flash pulmonary edema  Hypertensive emergency  Subacute right temporal ischemic stroke, AMS with receptive aphasia  PVD   12. ICA and L VA occlusion are chronic and known before 2019- R ICA stent is patent- endovascular following         Plan of Treatment:   S/p cardiac cath as above with Critical stenosis of left subclavian artery proximal to LIMA origin noted on cath - vascular are following. Vascular planning stent placement today at 4 pm.   Continue on heparin for LV thrombus, will require AC at discharge  Ischemic cardiomyopathy with ejection fraction 35 to 40% clinically not in fluid overload -continue ACE , beta-blocker and IV diuresis bid with close monitoring on renal function  Continue on ASA and satin  Hypertensive. Will add Norvasc 5 mg PO QD for better blood pressure control. Keep K>4, Mg>2. K 2.9 this am and currently being replaced via IV.             ISAAC Bernal - CNP   Fellow, Cardiovascular Diseases   [facility]   Pager - [phone]

## 2023-01-16 NOTE — PROGRESS NOTES
Bs blood sugar 337 , recheck at 2 am.  Note to care providers asking if they want to change @ 6 hour blood sugars to ac/hs with coverage. ?  Carb controlled diet? Spoke with pt about Large regular Pepsi in room , could be the reason his blood sugar is so high removed the rest from room.

## 2023-01-16 NOTE — PROGRESS NOTES
[facility]  Progress Note    Date:   1/16/2023  Patient name:  Douglas Ibrahim  Date of admission:  1/7/2023  4:22 PM  MRN:   [id number]  YOB: 1965    SUBJECTIVE/Last 24 hours update:     Potassium 2.9, replacement initiated recheck 1 PM  Patient scheduled at 5 PM for stenting subclavian n versus angioplasty  Magnesium 1.9, replacement protocol      HPI:     Patient is a 60 yo male admitted on 1/7/23 for AMS. Last well known was 1/5/23. Patient was found to be disheveled and having poor intake. In the ER patient was found to be hypertensive and elevated blood glucose levels (concern for DKA)  EKG showed STEMI, cardiology recommended starting the patient on Heparin gtt  CTA head showed severe stenosis in the left subclavian artery 2/2 noncalcified atheroscleotic plaque at the level of left vertebral artery. Patient was transferred to ICU for Hypertensive emergency and DKA management     In the ICU patient was assessed by cardiology and neurology and underwent cardiac cath which showed significant multivessel CAD, and patent grafts. Cardiology recommended continued medical therapy. Patient received Nitro drip for HTN emergency and was weaned off  Endovascular surgery was consulted for stenosis of subclavian artery but no intervention was recommended by them. Neurology was consulted for AMS did MRI brain which showed tiny acute infarcts in R temporal region per neurology not the cause of encephalopathy and so LTME was done which did not show electrographic seizures. REVIEW OF SYSTEMS:        Patient was not awake, ROS could not be completed    PAST MEDICAL HISTORY:      has a past medical history of CAD (coronary artery disease), Cardiac arrest ([address] Diabetes mellitus ([address] Hyperlipidemia, Hypertension, Osteolysis, PEA (Pulseless electrical activity) ([address] PVD (peripheral vascular disease) ([address] and Wound, open.     PAST SURGICAL HISTORY:      has a past surgical history that includes Femur fracture surgery (Right, 2000); Cardiac catheterization (04/07/2019); Femur Surgery; Tonsillectomy (1974); Coronary artery bypass graft (04/07/2019); Hardware Removal (Right, 04/07/2019); Tibia fracture surgery (Right, 2000); Coronary artery bypass graft (N/A, 04/07/2019); RECONSTRUCTIVE REPAIR STERNAL (N/A, 04/08/2019); Hardware Removal (Right, 04/22/2019); REMOVE HARDWARE FEMUR (N/A, 04/22/2019); Carotid stent placement (07/03/2019); Leg amputation below knee (Right, 08/25/2019); Leg amputation below knee (Right, 08/27/2019); above knee amputation (Right, 09/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 SOCIAL HISTORY:      reports that he has been smoking cigarettes. He started smoking about 2 years ago. He has a 20.00 pack-year smoking history. He has never used smokeless tobacco. He reports that he does not drink alcohol and does not use drugs. FAMILY HISTORY:     family history includes Diabetes in his brother, father, and mother; Heart Disease in his brother, father, and mother; High Blood Pressure in his brother, father, and sister; Stroke in his father and mother. HOME MEDICATIONS:      Prior to Admission medications    Medication Sig Start Date End Date Taking?  Authorizing Provider   metoprolol tartrate (LOPRESSOR) 25 MG tablet Take 2 tablets by mouth 2 times daily 8/24/22   Nadiya Mccoy MD   clopidogrel (PLAVIX) 75 MG tablet TAKE 1 TABLET BY MOUTH DAILY 2/17/22   Luba Metcalf MD   gabapentin (NEURONTIN) 300 MG capsule TAKE 1 CAPSULE BY MOUTH EVERY 8 HOURS 2/17/22 3/19/22  Luba Metcalf MD   metFORMIN (GLUCOPHAGE) 1000 MG tablet Take 1 tablet by mouth 2 times daily (with meals) 1/13/22 3/14/22  Luba Metcalf MD   insulin lispro, 1 Unit Dial, (HUMALOG KWIKPEN) 100 UNIT/ML SOPN INJECT SUBCUTANEOUSLY BEFORE MEALS AS DIRECTED PER SLIDING SCALE: SEE ATTACHED FOR SLIDING SCALE DIRECTIONS: Hold all insulin and notify Dr immediately if BG <60. -200: 2 UNITS; -250: 4UNITS; 251-300 6 UNITS; 301-350: 8 UNITS  Patient not taking: Reported on 1/13/2022 1/5/22   Dayne Serrano MD   lisinopril-hydroCHLOROthiazide (PRINZIDE;ZESTORETIC) 20-25 MG per tablet Take 2 tablets by mouth daily .  Take 1 pill if blood pressure is less than 130/80 12/23/21   Dayne Serrano MD   insulin glargine (LANTUS SOLOSTAR) 100 UNIT/ML injection pen Inject 24 Units into the skin nightly 11/9/21 1/13/22  Dannie Parker MD   Insulin Syringe-Needle U-100 30G X 5/16\" 1 ML MISC 1 each by Does not apply route daily 11/9/21   Dannie Parker MD   atorvastatin (LIPITOR) 40 MG tablet Take 1 tablet by mouth nightly 10/19/21   Peggy Gerard MD   metoprolol tartrate (LOPRESSOR) 50 MG tablet Take 1 tablet by mouth 2 times daily 10/19/21   Peggy Gerard MD   blood glucose test strips (ONETOUCH VERIO) strip TEST 2 TIMES A DAY & AS NEEDED FOR SYMPTOMS OF IRREGULAR BLOOD GLUCOSE. 5/28/21   Levy Rosenberg MD   Alcohol Swabs (B-D SINGLE USE SWABS REGULAR) PADS USE AS DIRECTED WITH TESTING SUPPLIES 5/28/21   Levy Rosenberg MD   Lancets (ONETOUCH DELICA PLUS [id number]) MISC USE AS DIRECTED TO TEST BLOOD SUGAR 5/28/21   Levy Rosenberg MD   sildenafil (VIAGRA) 100 MG tablet Take 1 tablet by mouth as needed for Erectile Dysfunction 1/7/21   Levy Rosenberg MD   Blood Glucose Monitoring Suppl (ONE TOUCH ULTRA 2) w/Device KIT U UTD ONCE D 9/27/19   Historical Provider, MD   acetaminophen (TYLENOL) 500 MG tablet Take 2 tablets by mouth every 8 hours as needed for Pain 9/30/19   uLi Cuello DO   Handicap Placard MISC by Does not apply route For 5 years 9/27/19   Levy Rosenberg MD   Blood Glucose Monitoring Suppl (ACURA BLOOD GLUCOSE METER) w/Device KIT 1 kit by Does not apply route once for 1 dose 9/27/19 9/27/19  Levy Rosenberg MD   Insulin Pen Needle 32G X 6 MM MISC 1 each by Does not apply route daily 9/27/19   Levy Rosenberg MD   furosemide (LASIX) 20  MG tablet Take 1 tablet by mouth daily  Patient not taking: Reported on 11/16/2021 7/4/19   Perez DEANNA ParksN - CNP   aspirin 81 MG chewable tablet Take 1 tablet by mouth daily 5/10/19   Samanta Dobbins MD   Multiple Vitamins-Minerals (THERAPEUTIC MULTIVITAMIN-MINERALS) tablet Take 1 tablet by mouth daily (with breakfast) 5/10/19   Samanta Dobbins MD       ALLERGIES:     Patient has no known allergies. OBJECTIVE:       Vitals:    01/16/23 0538 01/16/23 0600 01/16/23 0720 01/16/23 0926   BP: (!) 151/90  (!) 179/86 (!) 177/90   Pulse: 78  83 88   Resp: 20 16 11   Temp: 98 °F (36.7 °C)  97.5 °F (36.4 °C) 98 °F (36.7 °C)   TempSrc:   Oral Oral   SpO2:   93% 92%   Weight:  138 lb 10.7 oz (62.9 kg)     Height:             Intake/Output Summary (Last 24 hours) at 1/16/2023 1010  Last data filed at 1/16/2023 9823  Gross per 24 hour   Intake 395.61 ml   Output 2850 ml   Net -2454.39 ml       PHYSICAL EXAM:  Physical Exam  Constitutional:       General: He is not in acute distress. Appearance: Normal appearance. He is not ill-appearing. HENT:      Head: Normocephalic and atraumatic. Eyes:      Extraocular Movements: Extraocular movements intact. Cardiovascular:      Rate and Rhythm: Normal rate and regular rhythm. Pulses: Normal pulses. Pulmonary:      Effort: Pulmonary effort is normal. No respiratory distress. Breath sounds: No wheezing, rhonchi or rales. Abdominal:      General: Bowel sounds are normal. There is no distension. Palpations: Abdomen is soft. Tenderness: There is no abdominal tenderness. There is no guarding. Genitourinary:     Comments: Tate in place  Musculoskeletal:      Comments: S/p right AKA   Skin:     General: Skin is warm.    Neurological:      Comments: Unarousable    DIAGNOSTICS:     Laboratory Testing:    Recent Results (from the past 24 hour(s))   POC Glucose Fingerstick    Collection Time: 01/15/23 12:38 PM   Result Value Ref Range    POC Glucose 382 (H) 75 - 110 mg/dL   APTT    Collection Time: 01/15/23  1:16 PM   Result Value Ref Range    PTT 58.6 (H) 20.5 - 30.5 sec   POC Glucose Fingerstick    Collection Time: 01/15/23  4:37 PM   Result Value Ref Range    POC Glucose 334 (H) 75 - 110 mg/dL   POC Glucose Fingerstick    Collection Time: 01/15/23 11:02 PM   Result Value Ref Range    POC Glucose 337 (H) 75 - 110 mg/dL   POC Glucose Fingerstick    Collection Time: 01/16/23  1:46 AM   Result Value Ref Range    POC Glucose 235 (H) 75 - 110 mg/dL   POC Glucose Fingerstick    Collection Time: 01/16/23  2:37 AM   Result Value Ref Range    POC Glucose 254 (H) 75 - 110 mg/dL   CBC with Auto Differential    Collection Time: 01/16/23  6:24 AM   Result Value Ref Range    WBC 11.0 3.5 - 11.3 k/uL    RBC 4.91 4.21 - 5.77 m/uL    Hemoglobin 14.4 13.0 - 17.0 g/dL    Hematocrit 44.6 40.7 - 50.3 %    MCV 90.8 82.6 - 102.9 fL    MCH 29.3 25.2 - 33.5 pg    MCHC 32.3 28.4 - 34.8 g/dL    RDW 13.2 11.8 - 14.4 %    Platelets 954 819 - 679 k/uL    MPV 10.8 8.1 - 13.5 fL    NRBC Automated 0.0 0.0 per 100 WBC    Seg Neutrophils 51 36 - 65 %    Lymphocytes 34 24 - 43 %    Monocytes 11 3 - 12 %    Eosinophils % 3 1 - 4 %    Basophils 1 0 - 2 %    Immature Granulocytes 0 0 %    Segs Absolute 5.61 1.50 - 8.10 k/uL    Absolute Lymph # 3.72 (H) 1.10 - 3.70 k/uL    Absolute Mono # 1.21 (H) 0.10 - 1.20 k/uL    Absolute Eos # 0.36 0.00 - 0.44 k/uL    Basophils Absolute 0.07 0.00 - 0.20 k/uL    Absolute Immature Granulocyte 0.04 0.00 - 0.30 k/uL   Basic Metabolic Panel    Collection Time: 01/16/23  6:24 AM   Result Value Ref Range    Glucose 71 70 - 99 mg/dL    BUN 9 6 - 20 mg/dL    Creatinine 0.92 0.70 - 1.20 mg/dL    Est, Glom Filt Rate >60 >60 mL/min/1.73m2    Calcium 8.8 8.6 - 10.4 mg/dL    Sodium 137 135 - 144 mmol/L    Potassium 2.9 (LL) 3.7 - 5.3 mmol/L    Chloride 100 98 - 107 mmol/L    CO2 27 20 - 31 mmol/L    Anion Gap 10 9 - 17 mmol/L   APTT    Collection Time: 01/16/23  6:24 AM   Result Value Ref Range    PTT 65.7 (H) 20.5 - 30.5 sec   Magnesium    Collection Time: 01/16/23  6:24 AM   Result Value Ref Range    Magnesium 1.9 1.6 - 2.6 mg/dL   POC Glucose Fingerstick    Collection Time: 01/16/23  7:13 AM   Result Value Ref Range    POC Glucose 104 75 - 110 mg/dL         Imaging/Diagonstics:    Current Facility-Administered Medications   Medication Dose Route Frequency Provider Last Rate Last Admin    lactated ringers infusion   IntraVENous Continuous Alvina Quevedo DO        famotidine (PEPCID) tablet 20 mg  20 mg Oral BID Anuj Zabala MD   20 mg at 01/16/23 0930    insulin glargine (LANTUS) injection vial 10 Units  10 Units SubCUTAneous Nightly Anuj Zabala MD   10 Units at 01/15/23 2220    nicotine (NICODERM CQ) 14 MG/24HR 1 patch  1 patch TransDERmal Daily Anna Whittington MD   1 patch at 01/15/23 0852    atorvastatin (LIPITOR) tablet 80 mg  80 mg Oral Nightly Stacey Gray MD   80 mg at 01/15/23 2214    lacosamide (VIMPAT) tablet 100 mg  100 mg Oral BID Henrique Magsi, APRN - CNP   100 mg at 01/16/23 0930    levETIRAcetam (KEPPRA) tablet 500 mg  500 mg Oral BID Henrique Magsi, APRN - CNP   500 mg at 01/16/23 0930    carvedilol (COREG) tablet 12.5 mg  12.5 mg Oral BID  Oswaldo Herrera, DO   12.5 mg at 01/16/23 0930    lisinopril (PRINIVIL;ZESTRIL) tablet 20 mg  20 mg Oral Daily Oswaldo Herrera, DO   20 mg at 01/16/23 0930    furosemide (LASIX) injection 40 mg  40 mg IntraVENous BID Sahra Barrientos MD   40 mg at 01/16/23 0931    insulin lispro (HUMALOG) injection vial 0-8 Units  0-8 Units SubCUTAneous Q6H Dwayne Yen DO   4 Units at 01/16/23 0244    bisacodyl (DULCOLAX) EC tablet 5 mg  5 mg Oral Daily PRN Mitchell Huston MD        heparin 25,000 units in dextrose 5 % 250 mL infusion (rate based)  5-30 Units/kg/hr IntraVENous Continuous Jesus Barr MD 16.3 mL/hr at 01/16/23 0651 24 Units/kg/hr at 01/16/23 0651    glucose chewable tablet 16 g  4 tablet Oral PRN Sahra Barrientos MD         dextrose bolus 10% 125 mL  125 mL IntraVENous PRN Sully Van MD        Or    dextrose bolus 10% 250 mL  250 mL IntraVENous PRN Sully Van MD        glucagon (rDNA) injection 1 mg  1 mg SubCUTAneous PRN Sully Van MD        dextrose 10 % infusion   IntraVENous Continuous GEOVANNA Van MD        heparin (porcine) injection 4,000 Units  4,000 Units IntraVENous GEOVANNA Gonzalez MD   4,000 Units at 01/10/23 1538    heparin (porcine) injection 2,000 Units  2,000 Units IntraVENous PRN Christy Gonzalez MD   2,000 Units at 01/14/23 4111    aspirin chewable tablet 81 mg  81 mg Oral Daily Christy Gonzalez MD   81 mg at 01/16/23 0931    therapeutic multivitamin-minerals 1 tablet  1 tablet Oral Daily with breakfast Christy Gonzalez MD   1 tablet at 01/16/23 0932    dextrose bolus 10% 125 mL  125 mL IntraVENous PRN Christy Gonzalez MD   Stopped at 01/10/23 1139    Or    dextrose bolus 10% 250 mL  250 mL IntraVENous PRN Christy Gonzalez MD        potassium chloride 10 mEq/100 mL IVPB (Peripheral Line)  10 mEq IntraVENous PRN Christy Gonzalez  mL/hr at 01/16/23 0917 10 mEq at 01/16/23 0917    magnesium sulfate 1000 mg in dextrose 5% 100 mL IVPB  1,000 mg IntraVENous PRN Chritsy Gonzalez MD   Stopped at 01/12/23 1752    sodium phosphate 10 mmol in sodium chloride 0.9 % 250 mL IVPB  10 mmol IntraVENous PRN Christy Gonzalez MD   Stopped at 01/10/23 1327    Or    sodium phosphate 15 mmol in dextrose 5 % 250 mL IVPB  15 mmol IntraVENous PRN Christy Gonzalez MD   Stopped at 01/08/23 1315    Or    sodium phosphate 20 mmol in dextrose 5 % 500 mL IVPB  20 mmol IntraVENous PRN Christy Gonzalez MD        sodium chloride flush 0.9 % injection 5-40 mL  5-40 mL IntraVENous 2 times per day Christy Gonzalez MD   10 mL at 01/16/23 0931    sodium chloride flush 0.9 % injection 5-40 mL  5-40 mL IntraVENous PRN Christy Gonzalez MD        0.9 % sodium chloride infusion   IntraVENous TORON Christy Gonzalez MD   Stopped at 01/14/23 1603    ondansetron (ZOFRAN-ODT) disintegrating tablet 4 mg  4 mg Oral Q8H PRN Tosin Fails, MD        Or    ondansetron (ZOFRAN) injection 4 mg  4 mg IntraVENous Q6H PRN Tosin Fails, MD        polyethylene glycol (GLYCOLAX) packet 17 g  17 g Oral Daily PRN Tosin Fails, MD        acetaminophen (TYLENOL) tablet 650 mg  650 mg Oral Q6H PRN Tosin Fails, MD        Or    acetaminophen (TYLENOL) suppository 650 mg  650 mg Rectal Q6H PRN Tosin Fails, MD           ASSESSMENT:     Principal Problem:    Altered mental status  Active Problems:    DKA, type 2, not at goal [facility])    Stenosis of right middle cerebral artery    At risk of seizures    Cardiomyopathy, ischemic    Acute ischemic right MCA stroke ([facility] [address]    Acute metabolic encephalopathy    Cerebral multi-infarct state  Resolved Problems:    * No resolved hospital problems.  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   -Aspirin 81 mg daily and Lipitor 80 mg nightly  -On Heparin Gtt  -Diet: NPO midnight  -Endovascular on board:              -no intervention, CTA is stable and L ICA and L VA occlusion are chronic and known before 2019              -F/u neuro endovascular clinic in 6 months  -Vascular on board:              -Vascular plan for procedure-stent subclavian versus angioplasty at 5 PM today    Left ventricle apical thrombus 1.4 cm X0 0.6 cm, LVEF 35-40%, MV-CAD s/p PEA arrest, s/p CABG 2019  -ECHO 1/9: Left ventricle apical thrombus- On Heparin Gtt              -Will need to be transition to [facility] at discharge  -Cardiology on board:              -S/p cardiac cath 1/13              -Continue heparin for LV thrombus, IV Lasix 40 mg twice daily, Coreg 12.5 mg twice daily, aspirin 81 mg daily, statin 40 mg nightly, lisinopril 20 mg daily   -Keep Mg above 2, Mg 1.9 today-replacement initiated   -Potassium protocol also initiated     -Cardiac cath done 1/13 showed multivessel CAD              -OM1 has high origin with ostial 90% stenosis              -LCx: Small caliber vessel. Proximal and distal 70% stenosis              -99% stenosis at proximal left subclavian artery at origin of LIMA, leading to its underfilling. Systolic CHF  -EF 46-96%, moderate reduction in LV systolic function  -Continue IV lasix 40 mg BID, Coreg 12.5 mg BID, lisinopril 20 mg QD  -Cardiology on board-want to continue ACE inhibitor's, beta-blockers and IV diuresis with close monitoring of renal function. And continue aspirin and statin     Leukocytosis (improving)  -CRP 49.1, procalcitonin 0.14  -WBC 11  -On IV rocephin  -Urine Culture negative 1/14  -Blood cultures and NGTD x 5 days  -Repeat blood cultures negative     T2DM  -Medium dose sliding scale  -Hypoglycemia protocol in place  -Glucose 104 today     AMS   -Neurology on board:              -No driving for 6 months              -Continue Keppra and Vimpat on discharge              -Signed off  -MRI brain showed tiny acute infarct   -LTME no seizure activty  -Continue ASA 81 mg QD, Lipitor 80 mg nightly  -Continue IV Keppra 500 mg BID and Vimpat 100 mg BID        Hypertension  -On coreg and Lisinopril      Gi Ppx: IV Pepcid 20 mg twice daily  DVT Ppx: On heparin drip       Jim Cabral MD  Family Medicine Resident  1/16/2023 10:10 AM            Please note that this chart was generated using voice recognition Dragon dictation software.   Although every effort was made to ensure the accuracy of this automated transcription, some errors in transcription may have occurred

## 2023-01-16 NOTE — PROGRESS NOTES
Endovascular Neurosurgery Progress Note    SUBJECTIVE:   Patient was awake and alert, patient stated he feels normal, no complaints. Vascular plan to do L SA stent placement today    Review of Systems:  CONSTITUTIONAL:  negative for fevers, chills, fatigue and malaise    EYES:  negative for double vision, blurred vision and photophobia     HEENT:  negative for tinnitus, epistaxis and sore throat    RESPIRATORY:  negative for cough, shortness of breath, wheezing    CARDIOVASCULAR:  negative for chest pain, palpitations, syncope, edema    GASTROINTESTINAL:  negative for nausea, vomiting    GENITOURINARY:  negative for incontinence    MUSCULOSKELETAL:  negative for neck or back pain    NEUROLOGICAL:  Negative for weakness and tingling  negative for headaches and dizziness    PSYCHIATRIC:  negative for anxiety      Review of systems otherwise negative. OBJECTIVE:     Vitals:    01/16/23 0720   BP: (!) 179/86   Pulse: 83   Resp: 16   Temp: 97.5 °F (36.4 °C)   SpO2: 93%        General:  Gen: normal habitus, NAD  HEENT: NCAT, mucosa moist  Cvs: RRR, S1 S2 normal  Resp: symmetric unlabored breathing  Abd: s/nd/nt  Ext: no edema  Skin: no lesions seen, warm and dry    Neuro:  Gen: Disoriented to Time, Place and Person  Lang/speech: Follows commands   CN: PERRL, EOMI, VFF, V1-3 intact, mild right face droop, hearing intact  Motor: grossly 5/5 UE and LE on the left, right arm 4/5, right leg amputated above knee 1/5  Sense: decrease on the left  Coord: deferred  DTR: deferred  Gait: deferred    NIH Stroke Scale:   1a  Level of consciousness: 0   1b. LOC questions:  0 - answers both questions correctly   1c. LOC commands: 0 - performs both tasks correctly   2. Best Gaze: 0 - normal   3. Visual: 0 - no visual loss   4. Facial Palsy: 1 - minor paralysis (flattened nasolabial fold, asymmetric on smiling)   5a. Motor left arm: 0 - no drift, limb holds 90 (or 45) degrees for full 10 seconds   5b. Motor right arm: 0   6a. Motor left le - no drift; leg holds 30 degree position for full 5 seconds   6b  Motor right leg:  3 - no effort against gravity; leg falls to bed immediately (pt has R AKA)   7. Limb Ataxia: 0 - absent   8. Sensory: 0   9. Best Language:  0   10. Dysarthria: 0 - normal   11. Extinction and Inattention: 0 - no abnormality         Total:   4     MRS: 05 (baseline due to R AKA)      LABS:   Reviewed. Lab Results   Component Value Date    HGB 14.4 2023    WBC 11.0 2023     2023     2023    BUN 9 2023    CREATININE 0.92 2023    AST 16 2023    ALT 18 2023    MG 1.5 (L) 2023    APTT 65.7 (H) 2023    INR 1.1 2022      No results found for: COVID19    RADIOLOGY:   Images were personally reviewed including:   Cerebral angiogram 7/3/2019:  1. Right proximal immediately after the origin cervical ICA atherosclerotic changes resulting into a focal area of stenosis measuring approximately 65% per NASCET criteria. 2.  The above mentioned stenosis was treated with post balloon angioplasty and stenting using a 7-10x 30 mm Acculink stent with no significant residual stenosis. 3.  Right common femoral artery severe stenosis with good collaterals. CTA head neck 23: R ICA Acculink stent patent and L ICA chronic occlusion seen before 2019. Dominant R VA and chronically occluded L VA       MRI brain w/o contrast 23: punctate right temporal periventricular stroke. 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 Patient presented to the ED with AMS and found to have glucose of 620 and possible sepsis.   Endovascular consulted for chronic L ICA and L VA occlusion and hx of R ICA stent in 2019. Neuro exam was stable  PLAN:   - no intervention from neuroendovascular standpoint, CTA is stable and L ICA and L VA occlusion are chronic and known before 2019  - R ICA stent is patent  - patient has a punctate right temporal periventricular stroke, he is currently already on aspirin, recommend continue it  - patient is on heparin for ACS/Left ventricle thrombus, cardiology  - Vascular surgery on board for left subclavian stenosis  - ICU management   - PT OT and SLP  - F/up with Neuro endovascular clinic in 6 months with Dr Khurram Foley       Case discussed with Dr. Khurram Foley attending.     Rayna Man MD  Stroke, [facility]  [facility]  Electronically signed 1/16/2023 at 8:40 AM

## 2023-01-17 LAB
ABSOLUTE EOS #: 0.3 K/UL (ref 0–0.4)
ABSOLUTE IMMATURE GRANULOCYTE: 0 K/UL (ref 0–0.3)
ABSOLUTE LYMPH #: 4.65 K/UL (ref 1–4.8)
ABSOLUTE MONO #: 0.59 K/UL (ref 0.1–0.8)
ANION GAP SERPL CALCULATED.3IONS-SCNC: 9 MMOL/L (ref 9–17)
BASOPHILS # BLD: 0 % (ref 0–2)
BASOPHILS ABSOLUTE: 0 K/UL (ref 0–0.2)
BUN BLDV-MCNC: 8 MG/DL (ref 6–20)
CALCIUM SERPL-MCNC: 8.3 MG/DL (ref 8.6–10.4)
CHLORIDE BLD-SCNC: 98 MMOL/L (ref 98–107)
CO2: 25 MMOL/L (ref 20–31)
CREAT SERPL-MCNC: 0.83 MG/DL (ref 0.7–1.2)
EOSINOPHILS RELATIVE PERCENT: 3 % (ref 1–4)
GFR SERPL CREATININE-BSD FRML MDRD: >60 ML/MIN/1.73M2
GLUCOSE BLD-MCNC: 136 MG/DL (ref 75–110)
GLUCOSE BLD-MCNC: 146 MG/DL (ref 75–110)
GLUCOSE BLD-MCNC: 207 MG/DL (ref 75–110)
GLUCOSE BLD-MCNC: 210 MG/DL (ref 75–110)
GLUCOSE BLD-MCNC: 323 MG/DL (ref 70–99)
GLUCOSE BLD-MCNC: 329 MG/DL (ref 75–110)
HCT VFR BLD CALC: 40.9 % (ref 40.7–50.3)
HEMOGLOBIN: 13.2 G/DL (ref 13–17)
IMMATURE GRANULOCYTES: 0 %
INR BLD: 1.1
LYMPHOCYTES # BLD: 47 % (ref 24–44)
MAGNESIUM: 1.6 MG/DL (ref 1.6–2.6)
MCH RBC QN AUTO: 29.7 PG (ref 25.2–33.5)
MCHC RBC AUTO-ENTMCNC: 32.3 G/DL (ref 28.4–34.8)
MCV RBC AUTO: 92.1 FL (ref 82.6–102.9)
MONOCYTES # BLD: 6 % (ref 1–7)
MORPHOLOGY: NORMAL
NRBC AUTOMATED: 0 PER 100 WBC
PARTIAL THROMBOPLASTIN TIME: 49.1 SEC (ref 20.5–30.5)
PARTIAL THROMBOPLASTIN TIME: 71.4 SEC (ref 20.5–30.5)
PARTIAL THROMBOPLASTIN TIME: 92 SEC (ref 20.5–30.5)
PDW BLD-RTO: 13.3 % (ref 11.8–14.4)
PLATELET # BLD: 318 K/UL (ref 138–453)
PMV BLD AUTO: 10.7 FL (ref 8.1–13.5)
POTASSIUM SERPL-SCNC: 3.4 MMOL/L (ref 3.7–5.3)
POTASSIUM SERPL-SCNC: 3.6 MMOL/L (ref 3.7–5.3)
PROTHROMBIN TIME: 11.8 SEC (ref 9.1–12.3)
RBC # BLD: 4.44 M/UL (ref 4.21–5.77)
SEG NEUTROPHILS: 44 % (ref 36–66)
SEGMENTED NEUTROPHILS ABSOLUTE COUNT: 4.36 K/UL (ref 1.8–7.7)
SODIUM BLD-SCNC: 132 MMOL/L (ref 135–144)
WBC # BLD: 9.9 K/UL (ref 3.5–11.3)

## 2023-01-17 PROCEDURE — 36415 COLL VENOUS BLD VENIPUNCTURE: CPT

## 2023-01-17 PROCEDURE — 80048 BASIC METABOLIC PNL TOTAL CA: CPT

## 2023-01-17 PROCEDURE — 6360000002 HC RX W HCPCS: Performed by: STUDENT IN AN ORGANIZED HEALTH CARE EDUCATION/TRAINING PROGRAM

## 2023-01-17 PROCEDURE — 2060000000 HC ICU INTERMEDIATE R&B

## 2023-01-17 PROCEDURE — 84132 ASSAY OF SERUM POTASSIUM: CPT

## 2023-01-17 PROCEDURE — 97110 THERAPEUTIC EXERCISES: CPT

## 2023-01-17 PROCEDURE — 83735 ASSAY OF MAGNESIUM: CPT

## 2023-01-17 PROCEDURE — 85730 THROMBOPLASTIN TIME PARTIAL: CPT

## 2023-01-17 PROCEDURE — 6370000000 HC RX 637 (ALT 250 FOR IP): Performed by: STUDENT IN AN ORGANIZED HEALTH CARE EDUCATION/TRAINING PROGRAM

## 2023-01-17 PROCEDURE — 2580000003 HC RX 258

## 2023-01-17 PROCEDURE — 99221 1ST HOSP IP/OBS SF/LOW 40: CPT | Performed by: FAMILY MEDICINE

## 2023-01-17 PROCEDURE — 97116 GAIT TRAINING THERAPY: CPT

## 2023-01-17 PROCEDURE — 6370000000 HC RX 637 (ALT 250 FOR IP)

## 2023-01-17 PROCEDURE — 85025 COMPLETE CBC W/AUTO DIFF WBC: CPT

## 2023-01-17 PROCEDURE — 6370000000 HC RX 637 (ALT 250 FOR IP): Performed by: INTERNAL MEDICINE

## 2023-01-17 PROCEDURE — 6370000000 HC RX 637 (ALT 250 FOR IP): Performed by: NURSE PRACTITIONER

## 2023-01-17 PROCEDURE — 82947 ASSAY GLUCOSE BLOOD QUANT: CPT

## 2023-01-17 PROCEDURE — 2580000003 HC RX 258: Performed by: STUDENT IN AN ORGANIZED HEALTH CARE EDUCATION/TRAINING PROGRAM

## 2023-01-17 PROCEDURE — 85610 PROTHROMBIN TIME: CPT

## 2023-01-17 PROCEDURE — 6360000002 HC RX W HCPCS

## 2023-01-17 RX ORDER — INSULIN GLARGINE 100 [IU]/ML
15 INJECTION, SOLUTION SUBCUTANEOUS NIGHTLY
Status: DISCONTINUED | OUTPATIENT
Start: 2023-01-17 | End: 2023-01-22 | Stop reason: HOSPADM

## 2023-01-17 RX ORDER — INSULIN LISPRO 100 [IU]/ML
10 INJECTION, SOLUTION INTRAVENOUS; SUBCUTANEOUS ONCE
Status: DISCONTINUED | OUTPATIENT
Start: 2023-01-17 | End: 2023-01-17

## 2023-01-17 RX ORDER — INSULIN GLARGINE 100 [IU]/ML
20 INJECTION, SOLUTION SUBCUTANEOUS NIGHTLY
Status: DISCONTINUED | OUTPATIENT
Start: 2023-01-17 | End: 2023-01-17

## 2023-01-17 RX ORDER — INSULIN LISPRO 100 [IU]/ML
0-8 INJECTION, SOLUTION INTRAVENOUS; SUBCUTANEOUS
Status: DISCONTINUED | OUTPATIENT
Start: 2023-01-17 | End: 2023-01-22 | Stop reason: HOSPADM

## 2023-01-17 RX ORDER — WARFARIN SODIUM 5 MG/1
5 TABLET ORAL ONCE
Status: COMPLETED | OUTPATIENT
Start: 2023-01-17 | End: 2023-01-17

## 2023-01-17 RX ORDER — POTASSIUM CHLORIDE 20 MEQ/1
40 TABLET, EXTENDED RELEASE ORAL PRN
Status: DISCONTINUED | OUTPATIENT
Start: 2023-01-17 | End: 2023-01-22 | Stop reason: HOSPADM

## 2023-01-17 RX ORDER — FUROSEMIDE 40 MG/1
40 TABLET ORAL DAILY
Status: DISCONTINUED | OUTPATIENT
Start: 2023-01-17 | End: 2023-01-22 | Stop reason: HOSPADM

## 2023-01-17 RX ORDER — INSULIN LISPRO 100 [IU]/ML
0-4 INJECTION, SOLUTION INTRAVENOUS; SUBCUTANEOUS NIGHTLY
Status: DISCONTINUED | OUTPATIENT
Start: 2023-01-17 | End: 2023-01-22 | Stop reason: HOSPADM

## 2023-01-17 RX ORDER — POTASSIUM CHLORIDE 20 MEQ/1
40 TABLET, EXTENDED RELEASE ORAL ONCE
Status: COMPLETED | OUTPATIENT
Start: 2023-01-17 | End: 2023-01-17

## 2023-01-17 RX ADMIN — ATORVASTATIN CALCIUM 80 MG: 80 TABLET, FILM COATED ORAL at 20:18

## 2023-01-17 RX ADMIN — INSULIN LISPRO 2 UNITS: 100 INJECTION, SOLUTION INTRAVENOUS; SUBCUTANEOUS at 12:09

## 2023-01-17 RX ADMIN — MAGNESIUM SULFATE HEPTAHYDRATE 1000 MG: 1 INJECTION, SOLUTION INTRAVENOUS at 10:56

## 2023-01-17 RX ADMIN — FAMOTIDINE 20 MG: 20 TABLET, FILM COATED ORAL at 08:50

## 2023-01-17 RX ADMIN — Medication 1 TABLET: at 08:50

## 2023-01-17 RX ADMIN — SODIUM CHLORIDE, POTASSIUM CHLORIDE, SODIUM LACTATE AND CALCIUM CHLORIDE: 600; 310; 30; 20 INJECTION, SOLUTION INTRAVENOUS at 19:38

## 2023-01-17 RX ADMIN — LACOSAMIDE 100 MG: 100 TABLET, FILM COATED ORAL at 08:50

## 2023-01-17 RX ADMIN — LEVETIRACETAM 500 MG: 500 TABLET, FILM COATED ORAL at 20:18

## 2023-01-17 RX ADMIN — HEPARIN SODIUM 23 UNITS/KG/HR: 10000 INJECTION, SOLUTION INTRAVENOUS at 14:32

## 2023-01-17 RX ADMIN — SODIUM CHLORIDE, POTASSIUM CHLORIDE, SODIUM LACTATE AND CALCIUM CHLORIDE: 600; 310; 30; 20 INJECTION, SOLUTION INTRAVENOUS at 08:52

## 2023-01-17 RX ADMIN — INSULIN LISPRO 6 UNITS: 100 INJECTION, SOLUTION INTRAVENOUS; SUBCUTANEOUS at 02:47

## 2023-01-17 RX ADMIN — LACOSAMIDE 100 MG: 100 TABLET, FILM COATED ORAL at 20:18

## 2023-01-17 RX ADMIN — LEVETIRACETAM 500 MG: 500 TABLET, FILM COATED ORAL at 08:50

## 2023-01-17 RX ADMIN — CARVEDILOL 12.5 MG: 12.5 TABLET, FILM COATED ORAL at 08:50

## 2023-01-17 RX ADMIN — INSULIN GLARGINE 15 UNITS: 100 INJECTION, SOLUTION SUBCUTANEOUS at 21:37

## 2023-01-17 RX ADMIN — LISINOPRIL 20 MG: 20 TABLET ORAL at 08:50

## 2023-01-17 RX ADMIN — POTASSIUM CHLORIDE 40 MEQ: 1500 TABLET, EXTENDED RELEASE ORAL at 08:49

## 2023-01-17 RX ADMIN — SODIUM CHLORIDE, PRESERVATIVE FREE 10 ML: 5 INJECTION INTRAVENOUS at 08:50

## 2023-01-17 RX ADMIN — FUROSEMIDE 40 MG: 10 INJECTION, SOLUTION INTRAMUSCULAR; INTRAVENOUS at 08:50

## 2023-01-17 RX ADMIN — AMLODIPINE BESYLATE 5 MG: 5 TABLET ORAL at 08:50

## 2023-01-17 RX ADMIN — ASPIRIN 81 MG: 81 TABLET, CHEWABLE ORAL at 08:50

## 2023-01-17 RX ADMIN — HEPARIN SODIUM 2000 UNITS: 1000 INJECTION INTRAVENOUS; SUBCUTANEOUS at 12:11

## 2023-01-17 RX ADMIN — MAGNESIUM SULFATE HEPTAHYDRATE 1000 MG: 1 INJECTION, SOLUTION INTRAVENOUS at 12:09

## 2023-01-17 RX ADMIN — INSULIN LISPRO 2 UNITS: 100 INJECTION, SOLUTION INTRAVENOUS; SUBCUTANEOUS at 17:28

## 2023-01-17 RX ADMIN — WARFARIN SODIUM 5 MG: 5 TABLET ORAL at 17:28

## 2023-01-17 RX ADMIN — CARVEDILOL 12.5 MG: 12.5 TABLET, FILM COATED ORAL at 17:29

## 2023-01-17 RX ADMIN — FAMOTIDINE 20 MG: 20 TABLET, FILM COATED ORAL at 20:19

## 2023-01-17 ASSESSMENT — PAIN SCALES - GENERAL: PAINLEVEL_OUTOF10: 0

## 2023-01-17 NOTE — CARE COORDINATION
Met with pt to assess for support and complete PHQ-9 screen. Pt stated he lives alone at [facility]. Stated he has 3 sons and and 1 dtr (\"she adopted me\"). Stated one of his sons passed away. Pt reports that one son and his \"dtr\" live in [facility]. Pt stated he has 2 brothers and a step-mom here as well. Pt stated family is supportive and help as needed. 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 He reports he has a lot of support in the recovering community. Pt stated \"I'm loved by a lot of people. I'm a good person\". Pt verbalized that he did not utilize his support when he knew something was wrong medically and stated he knows he is stubborn - pt encouraged to use his support network. Pt denies any recent feelings of depression. Patient Health Questionnaire-9 (PHQ-9)    Over the last 2 weeks, how often have you been bothered by any of the following problems? 1. Little Interest or pleasure in doing things? [x] Not at all  [] Several Days  [] More than half the day  []  Nearly every day    2. Feeling down, depressed or hopeless? [x] Not at all  [] Several Days  [] More than half the day  []  Nearly every day    3. Trouble falling or staying asleep, or sleeping too much? [x] Not at all  [] Several Days  [] More than half the day  []  Nearly every day    4. Feeling tired or having little energy? [x] Not at all  [] Several Days  [] More than half the day  []  Nearly every day    5. Poor apettite or overeating? [] Not at all  [x] Several Days  [] More than half the day  []  Nearly every day    6. Feeling bad about yourself-or that you are a failure or have let yourself or your family down? [x] Not at all  [] Several Days  [] More than half the day  []  Nearly every day    7. Trouble concentrating on things, such as reading the newspaper or watching television?    [x] Not at all  [] Several Days  [] More than half the day  []  Nearly every day    8. Moving or speaking so slowly that other people could have noticed? Or the opposite-being so fidgety or restless that you have been moving around a lot more than usual?   [x] Not at all  [] Several Days  [] More than half the day  []  Nearly every day    9. Thoughts that you would be better off dead or of hurting yourself in some way? [x] Not at all  [] Several Days  [] More than half the day  []  Nearly every day    Total Score: 1    If you checked off any problems, how difficult have these problems made it for you to do your work, take care of things at home, or get along with other people?    [] Not difficult at all  [] Somewhat Difficult  [] Very Difficult  []  Extremely Difficult

## 2023-01-17 NOTE — PROGRESS NOTES
[address] [facility]  Progress Note    Date:   1/17/2023  Patient name:  Argenis Fenton  Date of admission:  1/7/2023  4:22 PM  MRN:   [id number]  YOB: 1965    SUBJECTIVE/Last 24 hours update:       Patient still receiving potassium replacement, potassium today 3.4  Patient underwent stenting of his left subclavian artery  Patient was continued on heparin drip  Possible discharge  Glucose 323, lantus increased to 20 units and given 10 units of lispro this morning   Continue Mg replacement, Mg today 1.6    HPI:     Patient is a 60 yo male admitted on 1/7/23 for AMS. Last well known was 1/5/23. Patient was found to be disheveled and having poor intake. In the ER patient was found to be hypertensive and elevated blood glucose levels (concern for DKA)  EKG showed STEMI, cardiology recommended starting the patient on Heparin gtt  CTA head showed severe stenosis in the left subclavian artery 2/2 noncalcified atheroscleotic plaque at the level of left vertebral artery. Patient was transferred to ICU for Hypertensive emergency and DKA management     In the ICU patient was assessed by cardiology and neurology and underwent cardiac cath which showed significant multivessel CAD, and patent grafts. Cardiology recommended continued medical therapy. Patient received Nitro drip for HTN emergency and was weaned off  Endovascular surgery was consulted for stenosis of subclavian artery but no intervention was recommended by them. Neurology was consulted for AMS did MRI brain which showed tiny acute infarcts in R temporal region per neurology not the cause of encephalopathy and so LTME was done which did not show electrographic seizures. REVIEW OF SYSTEMS:        Review of Systems   Constitutional:  Negative for activity change, appetite change and fatigue. HENT:  Negative for congestion. Eyes:  Negative for photophobia and visual disturbance. Respiratory:  Negative for apnea, cough, shortness of breath and wheezing. Cardiovascular:  Negative for chest pain, palpitations and leg swelling. Gastrointestinal:  Negative for abdominal distention, abdominal pain, constipation, diarrhea, nausea and vomiting. Endocrine: Negative for polyuria. Genitourinary:  Negative for difficulty urinating and urgency. Musculoskeletal:  Negative for arthralgias and back pain. Skin:  Negative for color change. Neurological:  Negative for dizziness, syncope and headaches. Psychiatric/Behavioral:  Negative for agitation, behavioral problems, decreased concentration and dysphoric mood. PAST MEDICAL HISTORY:      has a past medical history of CAD (coronary artery disease), Cardiac arrest ([facility] [address] Diabetes mellitus ([facility] [address] Hyperlipidemia, Hypertension, Osteolysis, PEA (Pulseless electrical activity) ([facility] [address] PVD (peripheral vascular disease) ([facility] [address] and Wound, open. PAST SURGICAL HISTORY:      has a past surgical history that includes Femur fracture surgery (Right, 2000); Cardiac catheterization (04/07/2019); Femur Surgery; Tonsillectomy (1974); Coronary artery bypass graft (04/07/2019); Hardware Removal (Right, 04/07/2019); Tibia fracture surgery (Right, 2000); Coronary artery bypass graft (N/A, 04/07/2019); RECONSTRUCTIVE REPAIR STERNAL (N/A, 04/08/2019); Hardware Removal (Right, 04/22/2019); REMOVE HARDWARE FEMUR (N/A, 04/22/2019); Carotid stent placement (07/03/2019); Leg amputation below knee (Right, 08/25/2019); Leg amputation below knee (Right, 08/27/2019); above knee amputation (Right, 09/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 SOCIAL HISTORY:      reports that he has been smoking cigarettes. He started smoking about 2 years ago. He has a 20.00 pack-year smoking history. He has never used smokeless tobacco. He reports that he does not drink alcohol and does not use drugs. FAMILY HISTORY:     family history includes Diabetes in his brother, father, and mother; Heart Disease in his brother, father, and mother; High Blood Pressure in his brother, father, and sister; Stroke in his father and mother. HOME MEDICATIONS:      Prior to Admission medications    Medication Sig Start Date End Date Taking? Authorizing Provider   metoprolol tartrate (LOPRESSOR) 25 MG tablet Take 2 tablets by mouth 2 times daily 8/24/22   Sneha Lopez MD   clopidogrel (PLAVIX) 75 MG tablet TAKE 1 TABLET BY MOUTH DAILY 2/17/22   Mirna Borges MD   gabapentin (NEURONTIN) 300 MG capsule TAKE 1 CAPSULE BY MOUTH EVERY 8 HOURS 2/17/22 3/19/22  Mirna Borges MD   metFORMIN (GLUCOPHAGE) 1000 MG tablet Take 1 tablet by mouth 2 times daily (with meals) 1/13/22 3/14/22  Mirna Borges MD   insulin lispro, 1 Unit Dial, (HUMALOG KWIKPEN) 100 UNIT/ML SOPN INJECT SUBCUTANEOUSLY BEFORE MEALS AS DIRECTED PER SLIDING SCALE: SEE ATTACHED FOR SLIDING SCALE DIRECTIONS: Hold all insulin and notify Dr immediately if BG <60. -200: 2 UNITS; -250: 4UNITS; 251-300 6 UNITS; 301-350: 8 UNITS  Patient not taking: Reported on 1/13/2022 1/5/22   Mirna Borges MD   lisinopril-hydroCHLOROthiazide (PRINZIDE;ZESTORETIC) 20-25 MG per tablet Take 2 tablets by mouth daily .   Take 1 pill if blood pressure is less than 130/80 12/23/21   Mirna Borges MD   insulin glargine (LANTUS SOLOSTAR) 100 UNIT/ML injection pen Inject 24 Units into the skin nightly 11/9/21 1/13/22  Shayla Saini MD   Insulin Syringe-Needle U-100 30G X 5/16\" 1 ML MISC 1 each by Does not apply route daily 11/9/21   Shayla Saini MD   atorvastatin (LIPITOR) 40 MG tablet Take 1 tablet by mouth nightly 10/19/21   Devora Hong MD   metoprolol tartrate (LOPRESSOR) 50 MG tablet Take 1 tablet by mouth 2 times daily 10/19/21   Devora Hong MD   blood glucose test strips (ONETOUCH VERIO) strip TEST 2 TIMES A DAY & AS NEEDED FOR SYMPTOMS OF IRREGULAR BLOOD GLUCOSE. 5/28/21   Pricilla Babin MD Chet   Alcohol Swabs (B-D SINGLE USE SWABS REGULAR) PADS USE AS DIRECTED WITH TESTING SUPPLIES 5/28/21   Deuce Tom MD   Lancets (ONETOUCH DELICA PLUS [id number]) [address] [facility] USE AS DIRECTED TO TEST BLOOD SUGAR 5/28/21   Deuce Tom MD   sildenafil (VIAGRA) 100 MG tablet Take 1 tablet by mouth as needed for Erectile Dysfunction 1/7/21   Deuce Tom MD   Blood Glucose Monitoring Suppl (ONE TOUCH ULTRA 2) w/Device KIT U UTD ONCE D 9/27/19   Historical Provider, MD   acetaminophen (TYLENOL) 500 MG tablet Take 2 tablets by mouth every 8 hours as needed for Pain 9/30/19   Milo Curl, DO   Handicap Placard [address] [facility] by Does not apply route For 5 years 9/27/19   Deuce Tom MD   Blood Glucose Monitoring Suppl [facility] BLOOD GLUCOSE METER) w/Device KIT 1 kit by Does not apply route once for 1 dose 9/27/19 9/27/19  Deuce Tom MD   Insulin Pen Needle 32G X 6 MM MISC 1 each by Does not apply route daily 9/27/19   Deuce Tom MD   furosemide (LASIX) 20 MG tablet Take 1 tablet by mouth daily  Patient not taking: Reported on 11/16/2021 7/4/19   Sukhi Tejada, APRN - CNP   aspirin 81 MG chewable tablet Take 1 tablet by mouth daily 5/10/19   Dariel Romano MD   Multiple Vitamins-Minerals (THERAPEUTIC MULTIVITAMIN-MINERALS) tablet Take 1 tablet by mouth daily (with breakfast) 5/10/19   Dariel Romano MD       ALLERGIES:     Patient has no known allergies. OBJECTIVE:       Vitals:    01/16/23 2145 01/17/23 0030 01/17/23 0324 01/17/23 0548   BP:  (!) 156/71 (!) 143/70    Pulse:  83 73    Resp:  16 16    Temp:  98.2 °F (36.8 °C) 97.6 °F (36.4 °C)    TempSrc:  Oral Oral    SpO2: 91%      Weight:    138 lb 7.2 oz (62.8 kg)   Height:             Intake/Output Summary (Last 24 hours) at 1/17/2023 0802  Last data filed at 1/17/2023 0327  Gross per 24 hour   Intake --   Output 1400 ml   Net -1400 ml         PHYSICAL EXAM:  Physical Exam  Constitutional:       General: He is not in acute distress.      Appearance: Normal appearance. He is not ill-appearing. HENT:      Head: Normocephalic and atraumatic. Eyes:      Extraocular Movements: Extraocular movements intact. Cardiovascular:      Rate and Rhythm: Normal rate and regular rhythm. Pulses: Normal pulses. Pulmonary:      Effort: Pulmonary effort is normal. No respiratory distress. Breath sounds: No wheezing, rhonchi or rales. Abdominal:      General: Bowel sounds are normal. There is no distension. Palpations: Abdomen is soft. Tenderness: There is no abdominal tenderness. There is no guarding. Genitourinary:     Comments: Tate in place  Musculoskeletal:      Comments: S/p right AKA   Skin:     General: Skin is warm.    Neurological:      Comments: Unarousable    DIAGNOSTICS:     Laboratory Testing:    Recent Results (from the past 24 hour(s))   POC Glucose Fingerstick    Collection Time: 01/16/23 11:26 AM   Result Value Ref Range    POC Glucose 161 (H) 75 - 110 mg/dL   Potassium    Collection Time: 01/16/23 12:53 PM   Result Value Ref Range    Potassium 4.0 3.7 - 5.3 mmol/L   POC Glucose Fingerstick    Collection Time: 01/16/23  3:42 PM   Result Value Ref Range    POC Glucose 138 (H) 75 - 110 mg/dL   POC Glucose Fingerstick    Collection Time: 01/16/23  8:31 PM   Result Value Ref Range    POC Glucose 132 (H) 75 - 110 mg/dL   POC Glucose Fingerstick    Collection Time: 01/17/23  2:12 AM   Result Value Ref Range    POC Glucose 329 (H) 75 - 110 mg/dL   CBC with Auto Differential    Collection Time: 01/17/23  3:06 AM   Result Value Ref Range    WBC 9.9 3.5 - 11.3 k/uL    RBC 4.44 4.21 - 5.77 m/uL    Hemoglobin 13.2 13.0 - 17.0 g/dL    Hematocrit 40.9 40.7 - 50.3 %    MCV 92.1 82.6 - 102.9 fL    MCH 29.7 25.2 - 33.5 pg    MCHC 32.3 28.4 - 34.8 g/dL    RDW 13.3 11.8 - 14.4 %    Platelets 388 726 - 870 k/uL    MPV 10.7 8.1 - 13.5 fL    NRBC Automated 0.0 0.0 per 100 WBC    Immature Granulocytes 0 0 %    Seg Neutrophils 44 36 - 66 %    Lymphocytes 47 (H) 24 - 44 %    Monocytes 6 1 - 7 %    Eosinophils % 3 1 - 4 %    Basophils 0 0 - 2 %    Absolute Immature Granulocyte 0.00 0.00 - 0.30 k/uL    Segs Absolute 4.36 1.8 - 7.7 k/uL    Absolute Lymph # 4.65 1.0 - 4.8 k/uL    Absolute Mono # 0.59 0.1 - 0.8 k/uL    Absolute Eos # 0.30 0.0 - 0.4 k/uL    Basophils Absolute 0.00 0.0 - 0.2 k/uL    Morphology Normal    Basic Metabolic Panel    Collection Time: 01/17/23  3:06 AM   Result Value Ref Range    Glucose 323 (H) 70 - 99 mg/dL    BUN 8 6 - 20 mg/dL    Creatinine 0.83 0.70 - 1.20 mg/dL    Est, Glom Filt Rate >60 >60 mL/min/1.73m2    Calcium 8.3 (L) 8.6 - 10.4 mg/dL    Sodium 132 (L) 135 - 144 mmol/L    Potassium 3.4 (L) 3.7 - 5.3 mmol/L    Chloride 98 98 - 107 mmol/L    CO2 25 20 - 31 mmol/L    Anion Gap 9 9 - 17 mmol/L   APTT    Collection Time: 01/17/23  3:06 AM   Result Value Ref Range    PTT 92.0 (HH) 20.5 - 30.5 sec         Imaging/Diagonstics:    Current Facility-Administered Medications   Medication Dose Route Frequency Provider Last Rate Last Admin    potassium chloride (KLOR-CON M) extended release tablet 40 mEq  40 mEq Oral Once Dragan Ventura MD        potassium chloride (KLOR-CON M) extended release tablet 40 mEq  40 mEq Oral PRN Dragan Ruiz MD        Or    potassium bicarb-citric acid (EFFER-K) effervescent tablet 40 mEq  40 mEq Oral PRN Dragan Ruiz MD        insulin glargine (LANTUS) injection vial 20 Units  20 Units SubCUTAneous Nightly Patricia Paez MD        insulin lispro (HUMALOG) injection vial 10 Units  10 Units SubCUTAneous Once Patricia Paez MD        insulin lispro (HUMALOG) injection vial 0-8 Units  0-8 Units SubCUTAneous TID WC Patricia Paez MD        insulin lispro (HUMALOG) injection vial 0-4 Units  0-4 Units SubCUTAneous Nightly Patriica Paez MD        lactated ringers infusion   IntraVENous Continuous Keshav DO Marshal 100 mL/hr at 01/16/23 2220 New Bag at 01/16/23 2220    amLODIPine ([facility]) tablet 5 mg  5 mg Oral Daily Eather Gottron, APRN - CNP   5 mg at 01/16/23 1108    acetaminophen (TYLENOL) tablet 650 mg  650 mg Oral Q4H PRN Aaliyah Glez MD        famotidine (PEPCID) tablet 20 mg  20 mg Oral BID Aida Sanchez MD   20 mg at 01/16/23 2109    nicotine (NICODERM CQ) 14 MG/24HR 1 patch  1 patch TransDERmal Daily Donnie Joaquin MD   1 patch at 01/15/23 0852    atorvastatin (LIPITOR) tablet 80 mg  80 mg Oral Nightly River Tran MD   80 mg at 01/16/23 2109    lacosamide (VIMPAT) tablet 100 mg  100 mg Oral BID Henrique Magsi, APRN - CNP   100 mg at 01/16/23 2109    levETIRAcetam (KEPPRA) tablet 500 mg  500 mg Oral BID Henrique Magsi, APRN - CNP   500 mg at 01/16/23 2109    carvedilol (COREG) tablet 12.5 mg  12.5 mg Oral BID  Oswaldo Javier, DO   12.5 mg at 01/16/23 1640    lisinopril (PRINIVIL;ZESTRIL) tablet 20 mg  20 mg Oral Daily Oswaldo Javier, DO   20 mg at 01/16/23 0930    furosemide (LASIX) injection 40 mg  40 mg IntraVENous BID Johnathon Babcock MD   40 mg at 01/16/23 1640    bisacodyl (DULCOLAX) EC tablet 5 mg  5 mg Oral Daily PRN Libia Sinclair MD        heparin 25,000 units in dextrose 5 % 250 mL infusion (rate based)  5-30 Units/kg/hr IntraVENous Continuous Lashon Blount MD 14.3 mL/hr at 01/17/23 0455 21 Units/kg/hr at 01/17/23 0455    glucose chewable tablet 16 g  4 tablet Oral PRN Johnathon Babcock MD        dextrose bolus 10% 125 mL  125 mL IntraVENous PRN Johnathon Babcock MD        Or    dextrose bolus 10% 250 mL  250 mL IntraVENous PRN Johnathon Babcock MD        glucagon (rDNA) injection 1 mg  1 mg SubCUTAneous PRN Johnathon Babcock MD        dextrose 10 % infusion   IntraVENous Continuous PRN Johnathon Babcock MD        heparin (porcine) injection 4,000 Units  4,000 Units IntraVENous PRN Lashon Blount MD   4,000 Units at 01/10/23 1538    heparin (porcine) injection 2,000 Units  2,000 Units IntraVENous PRN Lashon Blount MD   2,000 Units at 01/14/23 0412    aspirin chewable tablet 81 mg  81 mg Oral Daily Arya Desai MD   81 mg at 01/16/23 0931    therapeutic multivitamin-minerals 1 tablet  1 tablet Oral Daily with breakfast Arya Desai MD   1 tablet at 01/16/23 0932    dextrose bolus 10% 125 mL  125 mL IntraVENous PRN Arya Desai MD   Stopped at 01/10/23 1139    Or    dextrose bolus 10% 250 mL  250 mL IntraVENous PRN Arya Desai MD        potassium chloride 10 mEq/100 mL IVPB (Peripheral Line)  10 mEq IntraVENous PRN Arya Desai  mL/hr at 01/16/23 1142 10 mEq at 01/16/23 1142    magnesium sulfate 1000 mg in dextrose 5% 100 mL IVPB  1,000 mg IntraVENous PRN Arya Desai MD   Stopped at 01/12/23 1752    sodium phosphate 10 mmol in sodium chloride 0.9 % 250 mL IVPB  10 mmol IntraVENous PRN Arya Desai MD   Stopped at 01/10/23 1327    Or    sodium phosphate 15 mmol in dextrose 5 % 250 mL IVPB  15 mmol IntraVENous PRN Arya Desai MD   Stopped at 01/08/23 1315    Or    sodium phosphate 20 mmol in dextrose 5 % 500 mL IVPB  20 mmol IntraVENous PRN Arya Desai MD        sodium chloride flush 0.9 % injection 5-40 mL  5-40 mL IntraVENous 2 times per day Arya Desai MD   10 mL at 01/16/23 0931    sodium chloride flush 0.9 % injection 5-40 mL  5-40 mL IntraVENous PRN Arya Desai MD        0.9 % sodium chloride infusion   IntraVENous PRN Arya Desai MD   Stopped at 01/14/23 1603    ondansetron (ZOFRAN-ODT) disintegrating tablet 4 mg  4 mg Oral Q8H PRN Arya Desai MD        Or    ondansetron (ZOFRAN) injection 4 mg  4 mg IntraVENous Q6H PRN Arya Desai MD        polyethylene glycol (GLYCOLAX) packet 17 g  17 g Oral Daily PRN Arya Desai MD        acetaminophen (TYLENOL) tablet 650 mg  650 mg Oral Q6H PRN Arya Desai MD        Or    acetaminophen (TYLENOL) suppository 650 mg  650 mg Rectal Q6H PRN Arya Desai MD           ASSESSMENT:     Principal Problem:    Altered mental status  Active Problems:    DKA, type 2, not at goal [facility])    Stenosis of right middle cerebral artery    At risk of seizures    Cardiomyopathy, ischemic    Acute ischemic right MCA stroke ([address]    Acute metabolic encephalopathy    Cerebral multi-infarct state  Resolved Problems:    * No resolved hospital problems. 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   -Aspirin 81 mg daily and Lipitor 80 mg nightly  -On Heparin Gtt  -Diet: NPO midnight  -Endovascular on board:              -no intervention, CTA is stable and L ICA and L VA occlusion are chronic and known before 2019              -F/u neuro endovascular clinic in 6 months  -Vascular on board:              -Vascular surgery performed subclavian artery stents yesterday, patient tolerating diet, vital stable, heparin restarted- APTT 92 this morning      Left ventricle apical thrombus 1.4 cm X0 0.6 cm, LVEF 35-40%, MV-CAD s/p PEA arrest, s/p CABG 2019  -ECHO 1/9: Left ventricle apical thrombus- On Heparin Gtt              -Will need to be transition to [facility] at discharge  -Cardiology on board:              -S/p cardiac cath 1/13              -Continue heparin for LV thrombus, IV Lasix 40 mg twice daily, Coreg 12.5 mg twice daily, aspirin 81 mg daily, statin 40 mg nightly, lisinopril 20 mg daily   -Keep Mg above 2, Mg 1.6 today, replacement initiatied,    Potassium replacements initiated, potassium 3.4 today      -Cardiac cath done 1/13 showed multivessel CAD              -OM1 has high origin with ostial 90% stenosis              -LCx: Small caliber vessel. Proximal and distal 70% stenosis              -99% stenosis at proximal left subclavian artery at origin of LIMA, leading to its underfilling.       Systolic CHF  -EF 43-72%, moderate reduction in LV systolic function  -Continue IV lasix 40 mg BID, Coreg 12.5 mg BID, lisinopril 20 mg QD  -Cardiology on board-want to continue ACE inhibitor's, beta-blockers and IV diuresis with close monitoring of renal function. And continue aspirin and statin     Leukocytosis (improving)  -CRP 49.1, procalcitonin 0.14  -WBC 11  -On IV rocephin  -Urine Culture negative 1/14  -Blood cultures and NGTD x 5 days  -Repeat blood cultures negative     T2DM  -Medium dose sliding scale  -Hypoglycemia protocol in place  -Glucose 323  -Lantus increased to 20 units  -Given 10 units of lispro this morning      AMS   -Neurology on board:              -No driving for 6 months              -Continue Keppra and Vimpat on discharge              -Signed off  -MRI brain showed tiny acute infarct   -LTME no seizure activty  -Continue ASA 81 mg QD, Lipitor 80 mg nightly  -Continue IV Keppra 500 mg BID and Vimpat 100 mg BID        Hypertension  -On coreg and Lisinopril      Gi Ppx: IV Pepcid 20 mg twice daily  DVT Ppx: On heparin drip       Sophy Dunn MD  Family Medicine Resident  1/17/2023 8:02 AM            Please note that this chart was generated using voice recognition Dragon dictation software.   Although every effort was made to ensure the accuracy of this automated transcription, some errors in transcription may have occurred

## 2023-01-17 NOTE — PROGRESS NOTES
Endovascular Neurosurgery Progress Note    SUBJECTIVE:   Patient was awake and alert, patient stated he feels normal, no complaints. He is walking to his chair with help at the time I saw him, he is in good spirit    Review of Systems:  CONSTITUTIONAL:  negative for fevers, chills, fatigue and malaise    EYES:  negative for double vision, blurred vision and photophobia     HEENT:  negative for tinnitus, epistaxis and sore throat    RESPIRATORY:  negative for cough, shortness of breath, wheezing    CARDIOVASCULAR:  negative for chest pain, palpitations, syncope, edema    GASTROINTESTINAL:  negative for nausea, vomiting    GENITOURINARY:  negative for incontinence    MUSCULOSKELETAL:  negative for neck or back pain    NEUROLOGICAL:  Negative for weakness and tingling  negative for headaches and dizziness    PSYCHIATRIC:  negative for anxiety      Review of systems otherwise negative. OBJECTIVE:     Vitals:    01/17/23 0324   BP: (!) 143/70   Pulse: 73   Resp: 16   Temp: 97.6 °F (36.4 °C)   SpO2:         General:  Gen: normal habitus, NAD  HEENT: NCAT, mucosa moist  Cvs: RRR, S1 S2 normal  Resp: symmetric unlabored breathing  Abd: s/nd/nt  Ext: no edema  Skin: no lesions seen, warm and dry    Neuro:  Gen: Disoriented to Time, Place and Person  Lang/speech: Follows commands   CN: PERRL, EOMI, VFF, V1-3 intact, mild right face droop, hearing intact  Motor: grossly 5/5 UE and LE on the left, right arm 4/5, right leg amputated above knee 1/5  Sense: decrease on the left  Coord: deferred  DTR: deferred  Gait: deferred    NIH Stroke Scale:   1a  Level of consciousness: 0   1b. LOC questions:  0 - answers both questions correctly   1c. LOC commands: 0 - performs both tasks correctly   2. Best Gaze: 0 - normal   3. Visual: 0 - no visual loss   4. Facial Palsy: 1 - minor paralysis (flattened nasolabial fold, asymmetric on smiling)   5a.  Motor left arm: 0 - no drift, limb holds 90 (or 45) degrees for full 10 seconds 5b.  Motor right arm: 0   6a. Motor left le - no drift; leg holds 30 degree position for full 5 seconds   6b  Motor right leg:  3 - no effort against gravity; leg falls to bed immediately (pt has R AKA)   7. Limb Ataxia: 0 - absent   8. Sensory: 0   9. Best Language:  0   10. Dysarthria: 0 - normal   11. Extinction and Inattention: 0 - no abnormality         Total:   4     MRS: 05 (baseline due to R AKA)      LABS:   Reviewed. Lab Results   Component Value Date    HGB 13.2 2023    WBC 9.9 2023     2023     (L) 2023    BUN 8 2023    CREATININE 0.83 2023    AST 16 2023    ALT 18 2023    MG 1.9 2023    APTT 92.0 (HH) 2023    INR 1.1 2022      No results found for: COVID19    RADIOLOGY:   Images were personally reviewed including:   Cerebral angiogram 7/3/2019:  1. Right proximal immediately after the origin cervical ICA atherosclerotic changes resulting into a focal area of stenosis measuring approximately 65% per NASCET criteria. 2.  The above mentioned stenosis was treated with post balloon angioplasty and stenting using a 7-10x 30 mm Acculink stent with no significant residual stenosis. 3.  Right common femoral artery severe stenosis with good collaterals. CTA head neck 23: R ICA Acculink stent patent and L ICA chronic occlusion seen before 2019. Dominant R VA and chronically occluded L VA       MRI brain w/o contrast 23: punctate right temporal periventricular stroke. 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 Patient presented to the ED with AMS and found to have glucose of 620 and possible sepsis.   Endovascular consulted for chronic L ICA and L VA occlusion and hx of R ICA stent in 2019. Neuro exam was stable    L SA origin stent on the 1/16/2023  PLAN:   - no intervention from neuroendovascular standpoint, CTA is stable and L ICA and L VA occlusion are chronic and known before 2019  - R ICA stent is patent  - patient has a punctate right temporal periventricular stroke, he is currently already on aspirin, recommend continue it  - patient is on heparin for ACS/Left ventricle thrombus, cardiology managing  - ICU management   - PT OT and SLP  - F/up with Neuro endovascular clinic in 6 months with Dr Eben Moscoso       Case discussed with Dr. Eben Moscoso attending.     Deborah Manriquez MD  Stroke, [facility]  [facility]  Electronically signed 1/17/2023 at 8:10 AM

## 2023-01-17 NOTE — CARE COORDINATION
Transitional planning. Mary Nash with [facility] called, they have no beds available at this time. Informed Karin SANDERSON, CM.

## 2023-01-17 NOTE — PROGRESS NOTES
[facility]   Progress Note                   Date:   1/17/2023  Patient name: Reggie Dancer  Date of admission:  1/7/2023  4:22 PM  MRN:   [id number]  YOB: 1965  PCP: Nithya Stafford MD    Reason for Admission:      Subjective:       Patient seen and examined in room. He denies chest pain or shortness of breath. Tele/vitals/labs reviewed. Discussed case with RN, hypertensive. K being replaced, on heparin gtt. Medications:   Scheduled Meds:   insulin lispro  0-8 Units SubCUTAneous TID WC    insulin lispro  0-4 Units SubCUTAneous Nightly    insulin glargine  15 Units SubCUTAneous Nightly    amLODIPine  5 mg Oral Daily    famotidine  20 mg Oral BID    nicotine  1 patch TransDERmal Daily    atorvastatin  80 mg Oral Nightly    lacosamide  100 mg Oral BID    levETIRAcetam  500 mg Oral BID    carvedilol  12.5 mg Oral BID WC    lisinopril  20 mg Oral Daily    furosemide  40 mg IntraVENous BID    aspirin  81 mg Oral Daily    therapeutic multivitamin-minerals  1 tablet Oral Daily with breakfast    sodium chloride flush  5-40 mL IntraVENous 2 times per day       Continuous Infusions:   lactated ringers 100 mL/hr at 01/17/23 0852    heparin (PORCINE) Infusion 23 Units/kg/hr (01/17/23 1211)    dextrose      sodium chloride Stopped (01/14/23 1603)       CBC:   Recent Labs     01/15/23  0548 01/16/23  0624 01/17/23  0306   WBC 9.2 11.0 9.9   HGB 14.2 14.4 13.2    358 318       BMP:    Recent Labs     01/15/23  0548 01/16/23  0624 01/16/23  1253 01/17/23  0306 01/17/23  1057    137  --  132*  --    K 3.3* 2.9* 4.0 3.4* 3.6*    100  --  98  --    CO2 25 27  --  25  --    BUN 12 9  --  8  --    CREATININE 0.96 0.92  --  0.83  --    GLUCOSE 179* 71  --  323*  --        Hepatic: No results for input(s): AST, ALT, ALB, BILITOT, ALKPHOS in the last 72 hours. Troponin: No results for input(s): TROPONINI in the last 72 hours.   BNP: No results for input(s): BNP in the last 72 hours. Lipids: No results for input(s): CHOL, HDL in the last 72 hours. Invalid input(s): LDLCALCU  INR: No results for input(s): INR in the last 72 hours. Objective:   Vitals: /67   Pulse 76   Temp 97.3 °F (36.3 °C) (Oral)   Resp 18   Ht 5' 5\" (1.651 m)   Wt 138 lb 7.2 oz (62.8 kg)   SpO2 95%   BMI 23.04 kg/m²     General appearance: awake, altered, in no apparent respiratory distress   HEENT: Head: Normocephalic, no lesions, without obvious abnormality  Neck: no JVD  Lungs: clear to auscultation bilaterally, no basilar rales, no wheezing   Heart: regular rate and rhythm, S1, S2 normal, no murmur, click, rub or gallop, right groin no hematoma or bleeding noted  Abdomen: soft, non-tender; bowel sounds normal, gamino in place. Extremities: Right AKA , no LLE edema  Neurologic: not done. EK/10/23  Sinus tachycardia, q wave in anterior leads    Echocardiogram:  23  Summary  Left ventricle is normal in size. Global left ventricular systolic function  is moderately reduced. Visually estimated EF 35-40%. Global hypokinesis  worse in the apex and all apical segments. Definity used to improve delineation of left ventricular endocardial border. LV apical thrombus is noted. Measures 1.4cm x 0.6cm. Evidence of diastolic dysfunction. Thickened mitral valve leaflets. Trivial mitral regurgitation. Coronary Angiography:   19   Procedure Summary      1. Multivessel coronary artery disease. 2. Severe LV systolic dysfunction. 3. Mild aortic insufficiency   4. Distal aorta 40% stenosis. 5. Occluded left external iliac artery. 6. Severe diffuse disease of right iliac artery. Unable to pass IABP or   Impella      Recommendations      Medical therapy as needed. Risk factor modification. Routine Post Diagnostic Cath orders. Urgent surgical CABG. Discussed with CT surgery. IV lasix. CATH Procedure Summary 2023        1. Multivessel CAD    2.  Patent SVG-OM1/OM3 and SVG-RPDA grafts    3. Critical stenosis of left subclavian artery proximal to LIMA origin. LIMA-LAD appears to be patent on left coronary angiogram.    4. Overall preserved LV systolic function        Recommendations        Routine Post Diagnostic Cath orders. Medical therapy    Vascular surgery consult for consideration of subclavian artery stent      Electronically signed by Sammi Ann MD on 1/13/2023 at 3:30 PM  Cardiovascular fellow  [facility]        Attending Physician  I was present during the entire procedure and performed all the critical elements of it. Juan Carlos Price MD, [facility], [facility]                          Assessment / Acute Cardiac Problems:   H/o MVCAD s/p CABG [LIMA-LAD, SVG-PDA,OM 1 & distal circumflex) 4/7/2019  Ischemic cardiomyopathy,LVEF Improved on last echo >65%  LV thrombus 1/4 x 0.6 cm  Diabetes type II, in DKA - resolved  H/o CVA  ALMITA  Left subclavian stenosis  Flash pulmonary edema  Hypertensive emergency  Subacute right temporal ischemic stroke, AMS with receptive aphasia  PVD   12. ICA and L VA occlusion are chronic and known before 2019- R ICA stent is patent- endovascular following         Plan of Treatment:   S/p cardiac cath as above with Critical stenosis of left subclavian artery proximal to LIMA origin noted on cath - vascular are following. Vascular planning stent placement  Continue heparin gtt. With bridging with coumadin for LV thrombus  Ischemic cardiomyopathy with ejection fraction 35 to 40% clinically not in fluid overload -continue ACE , beta-blocker and switch IV lasix to PO with close monitoring on renal function. Continue on ASA and satin  HTN. Continue Norvasc. Keep K>4, Mg>2. K and Mg supplemented today.        ISAAC Vela - CNP     [facility]   Pager - [phone]

## 2023-01-17 NOTE — PLAN OF CARE
Problem: Discharge Planning  Goal: Discharge to home or other facility with appropriate resources  Outcome: Progressing     Problem: Chronic Conditions and Co-morbidities  Goal: Patient's chronic conditions and co-morbidity symptoms are monitored and maintained or improved  Outcome: Progressing     Problem: Confusion  Goal: Confusion, delirium, dementia, or psychosis is improved or at baseline  Description: INTERVENTIONS:  1. Assess for possible contributors to thought disturbance, including medications, impaired vision or hearing, underlying metabolic abnormalities, dehydration, psychiatric diagnoses, and notify attending LIP  2. [place] high risk fall precautions, as indicated  3. Provide frequent short contacts to provide reality reorientation, refocusing and direction  4. Decrease environmental stimuli, including noise as appropriate  5. Monitor and intervene to maintain adequate nutrition, hydration, elimination, sleep and activity  6. If unable to ensure safety without constant attention obtain sitter and review sitter guidelines with assigned personnel  7. Initiate Psychosocial CNS and Spiritual Care consult, as indicated  Outcome: Progressing     Problem: Skin/Tissue Integrity  Goal: Absence of new skin breakdown  Description: 1. Monitor for areas of redness and/or skin breakdown  2. Assess vascular access sites hourly  3. Every 4-6 hours minimum:  Change oxygen saturation probe site  4. Every 4-6 hours:  If on nasal continuous positive airway pressure, respiratory therapy assess nares and determine need for appliance change or resting period.   Outcome: Progressing     Problem: Safety - Adult  Goal: Free from fall injury  Outcome: Progressing     Problem: ABCDS Injury Assessment  Goal: Absence of physical injury  Outcome: Progressing     Problem: Pain  Goal: Verbalizes/displays adequate comfort level or baseline comfort level  Outcome: Progressing     Problem: Respiratory - Adult  Goal: Achieves optimal ventilation and oxygenation  Outcome: Progressing

## 2023-01-17 NOTE — CARE COORDINATION
Spoke to Jesus from [facility]. They are able to accept. Met with patient to discuss transitional planning. He does not want to go to SNF. He wants to go home. He is agreeable to home care. [place] of choice provided. Referral to [facility]. Patient is also requesting a walker. He denies other needs    [id number] notified by Eben Cruz from [facility] that they are unable to accept d/t OON. Multiple referrals sent.  Notified by Blayne Ty from [facility] that they are able to accept P: [phone] F: [phone]

## 2023-01-17 NOTE — PROGRESS NOTES
Division of Vascular Surgery             Progress Note      Name: Angy Pope  MRN: [id number]         Overnight Events:     None      Subjective:     Patient seen and examined this morning, no overnight events. Patient remains VSS, afebrile. Yesterday, patient underwent left subclavian stent placement which he tolerated well. This morning he feels well. On room air. Good UOP. Continues to deny chest pain this morning. Pleasant. Physical Exam:     Vitals:  BP (!) 143/70   Pulse 73   Temp 97.6 °F (36.4 °C) (Oral)   Resp 16   Ht 5' 5\" (1.651 m)   Wt 138 lb 7.2 oz (62.8 kg)   SpO2 91%   BMI 23.04 kg/m²     Physical Exam:  Vital signs and Nurse's note reviewed  Gen:  Alert to self, no acute distress, pleasantly confused at times. HEENT: PERRLA, EOMI, no scleral icterus, oral mucosa moist  Neck: Supple  Chest: Symmetric rise with inhalation, no evidence of trauma  CVS: Regular rate and rhythm  Resp: Good bilateral air entry, no wheeze or rhonchi  Abd: soft, non-tender, non-distended, bowel sounds present. Ext:s/p  R AKA well healed, no open wounds. L DP/PT signal  CNS: Moves all extremities, no gross focal motor deficits  Skin: No erythema or ulcerations   Pulses: R> L radial signal, R palpable      Imaging:   No results found. Assessment:     Subclavian stenosis proximal to LIMA origin   -Left subclavian stent placement 1/16/2023      Plan:     Examined the patient, stable exam  Patient underwent left subclavian stent placement yesterday. Tolerated well. Doing well this morning. Anticoagulation per primary  Okay for diet  No further intervention indicated from a vascular surgery perspective vascular surgery to sign off at this time. Call with any questions. Patient to follow-up with Dr. Carlos Manuel Bolanos as an outpatient in 1 month.      ----------------------------------------  Vincent [place]: [phone]  C: [phone]

## 2023-01-17 NOTE — PROGRESS NOTES
Pharmacy Note  Warfarin Consult    Diane Hair is a 61 y.o. male for whom pharmacy has been consulted to manage warfarin therapy. Consulting Physician: Inell Purchase  Reason for Admission: Altered Mental Status    Warfarin dose prior to admission: none  Warfarin indication: LV thrombus  Target INR range: 2-3     Past Medical History:   Diagnosis Date    CAD (coronary artery disease) 04/07/2019    triple bypass    Cardiac arrest ([facility] 04/07/2019    Diabetes mellitus ([facility] 04/2019    ON RX    Hyperlipidemia 04/2019    ON RX HAS NOT HAD SINCE 06/30/2019    Hypertension 04/2019    ON RX NONE SINCE 06/2019    Osteolysis 04/07/2019    R Knee hardware    PEA (Pulseless electrical activity) ([facility] 04/07/2019    PVD (peripheral vascular disease) ([facility] 04/07/2019    Wound, open 09/2019    RIGHT STUMP DRAINING MOD AMT BLOODY DRAINAGE                Recent Labs     01/17/23  1428   INR 1.1     Recent Labs     01/15/23  0548 01/16/23  0624 01/17/23  0306   HGB 14.2 14.4 13.2   HCT 43.5 44.6 40.9    358 318       Current warfarin drug-drug interactions: heparin gtt      Date             INR        Dose   1/17/2023            1.1       5 mg        Thank you for the consult. Will continue to follow.       Pamela Cabral PharmD, BCPS 1/17/2023 3:36 PM

## 2023-01-17 NOTE — PROGRESS NOTES
PATIENT REFUSES TO WEAR BIPAP     [x] Risks and benefits explained to patient   [x] Patient refuses to wear Bipap stating does not want to wear, has no history or need   [x] Patient verbalizes understanding of information presented.

## 2023-01-17 NOTE — PROGRESS NOTES
Physical Therapy  Facility/Department: [facility]  Physical Therapy daily treatment note    Name: Hilary Larry  : 1965  MRN: [id number]  Date of Service: 2023    Discharge Recommendations:  Patient would benefit from continued therapy after discharge   PT Equipment Recommendations  Equipment Needed: No      Patient Diagnosis(es): The primary encounter diagnosis was Altered mental status, unspecified altered mental status type. Diagnoses of Hyperglycemia and Stenosis of right middle cerebral artery were also pertinent to this visit. Past Medical History:  has a past medical history of CAD (coronary artery disease), Cardiac arrest ([facility] Diabetes mellitus ([facility] Hyperlipidemia, Hypertension, Osteolysis, PEA (Pulseless electrical activity) ([facility] PVD (peripheral vascular disease) ([facility] and Wound, open. Past Surgical History:  has a past surgical history that includes Femur fracture surgery (Right, ); Cardiac catheterization (2019); Femur Surgery; Tonsillectomy (); Coronary artery bypass graft (2019); Hardware Removal (Right, 2019); Tibia fracture surgery (Right, ); Coronary artery bypass graft (N/A, 2019); RECONSTRUCTIVE REPAIR STERNAL (N/A, 2019); Hardware Removal (Right, 2019); REMOVE HARDWARE FEMUR (N/A, 2019); Carotid stent placement (2019); Leg amputation below knee (Right, 2019); Leg amputation below knee (Right, 2019); above knee amputation (Right, 2019); AMPUTATION ABOVE KNEE (Right, 2019); and Insert / replace / remove pacemaker (2023). Assessment   Body Structures, Functions, Activity Limitations Requiring Skilled Therapeutic Intervention: Decreased functional mobility ; Decreased endurance;Decreased balance;Decreased safe awareness;Decreased body mechanics  Assessment: Pt amb 40 ft with RW and CGA. LImited by decreased balance, strength, and difficulty maintaining suction to prosthetic. Recommend contiuned PT after d/c to address deficits  Therapy Prognosis: Good  Activity Tolerance  Activity Tolerance: Patient tolerated treatment well     Plan   Physcial Therapy Plan  General Plan: 3-5 times per week  Current Treatment Recommendations: Strengthening, Balance training, Functional mobility training, Transfer training, Endurance training, Neuromuscular re-education, Cognitive reorientation, Safety education & training, Patient/Caregiver education & training, Therapeutic activities  Safety Devices  Type of Devices: Patient at risk for falls, Nurse notified, Gait belt, Left in chair  Restraints  Restraints Initially in Place: No     Restrictions  Restrictions/Precautions  Restrictions/Precautions: Up as Tolerated, Fall Risk, General Precautions  Required Braces or Orthoses?: No  Position Activity Restriction  Other position/activity restrictions: up with assist; Hx R AKA, gamino catheter, Prosthesis present in closet     Subjective   General  Patient assessed for rehabilitation services?: Yes  Response To Previous Treatment: Patient with no complaints from previous session.   Family / Caregiver Present: No  Subjective  Subjective: Pt resting in recliner upon arrival, agreeable to PT, slightly impulsive, distracted with difficulty staying on task        Cognition   Orientation  Overall Orientation Status: Within Normal Limits     Objective      Transfers  Sit to Stand: Contact guard assistance  Stand to Sit: Contact guard assistance  Comment: prosthetic donned prior to mobility, increased time to complete  Ambulation  Surface: Level tile  Device: Rolling Walker  Assistance: Minimal assistance  Quality of Gait: step to gait pattern advancing with R LE, unsteady, impulsive  Distance: 40 ft  More Ambulation?: No     Balance  Posture: Fair  Sitting - Static: Fair;+  Sitting - Dynamic: Fair;+  Standing - Static: Fair  Standing - Dynamic: Fair;-  Comments: assessed with RW   Exercise  Seated LE exercise program: [employer], hip abduction/adduction, heel/toe raises, and marches.  Reps: 10x with prosthetic donned      AM-PAC Score  AM-PAC Inpatient Mobility Raw Score : 18 (01/17/23 1551)  AM-PAC Inpatient T-Scale Score : 43.63 (01/17/23 1551)  Mobility Inpatient CMS 0-100% Score: 46.58 (01/17/23 1551)  Mobility Inpatient CMS G-Code Modifier : CK (01/17/23 1551)        Goals  Short Term Goals  Time Frame for Short Term Goals: 12 visits  Short Term Goal 1: Complete transfers with RW and mod I  Short Term Goal 2: Ambulate 200 ft with RW and mod I  Short Term Goal 3: Participate in 30 minutes of therapy to promote endurance       Therapy Time   Individual Concurrent Group Co-treatment   Time In 1122         Time Out 1147         Minutes 25         Timed Code Treatment Minutes: 25 Minutes       Zoila Flores PTA

## 2023-01-17 NOTE — OP NOTE
Operative Note      Patient: Amadoy Form  YOB: 1965  MRN: [id number]    Date of Procedure: 1/16/2023    Pre-Op Diagnosis: ADD ON. .... LEFT SUBCLAVIAN STENOSIS    Post-Op Diagnosis: Same       Procedure(s):  (2ND CASE ON 1/16/23)LEFT SUBCLAVIAN STENT PLACEMENT , POSSIBLE ANGIOPLASTY , POSSIBLE LEFT CARDIAC / SUBCLAVIAN BYPAS    Surgeon(s):  Shakeel Gonsalves MD    Assistant:   * No surgical staff found *    Anesthesia: General    Estimated Blood Loss (mL): Minimal    Complications: None    Specimens:   * No specimens in log *    Implants:  * No implants in log *      Drains:   External Urinary Catheter (Active)   Site Assessment Clean,dry & intact 01/15/23 0614   Placement Replaced 01/15/23 0614   Securement Method Leg strap 01/15/23 0614   Catheter Care Catheter/Wick replaced 01/15/23 0614   Perineal Care Yes 01/15/23 0614   Suction N/A 01/15/23 0614   Urine Color Yellow 01/16/23 0650   Urine Appearance Clear 01/15/23 0614   Urine Odor Malodorous 01/15/23 0614   Output (mL) 1300 mL 01/16/23 0650       Findings: Left subclavian stent in good position with widely patent LIMA filling antegrade    Detailed Description of Procedure: This is a 79-year-old male who comes in with some mental status changes and elevated troponins. He had a cardiac cath when he arrived and with a history of CABG was noted to have a high-grade stenosis of the proximal left subclavian artery. It showed reversed flow in the LIMA and he also has a occluded left vertebral artery. Vascular surgery was consulted for revascularization of the left subclavian. After the risks benefits alternatives were discussed informed consent was obtained. The patient was brought to the Cath Lab and placed supine the groins were cleaned and prepped and sterile drapes were applied. Ultrasound was used to identify the right common femoral artery. Using a micropuncture needle and catheter access was gained and a 6 [place] sheath was placed. Using a support cath and a Glidewire advantage the left subclavian artery was accessed fully catheterized. This was confirmed with a hand-injection of contrast and the high-grade stenosis was identified just proximal to the LIMA origin. Rotating the image intensifier, the lesion was isolated and the wire was successfully able to cross the lesion. Balloon angioplasty was performed of the lesion with a 7 mm x 40 mm balloon. This was a good approximation to the size of the native left subclavian artery and a 7 x 27 mm stent was deployed to treat the lesion. On completion arteriogram showed the LIMA to be filling antegrade briskly and there is no longer any reversal of flow. A minx closure was used to close the arteriotomy.   The procedure was tolerated well without complication    Electronically signed by Og aLndaverde MD on 1/16/2023 at 7:21 PM

## 2023-01-17 NOTE — PROGRESS NOTES
Talked with Dr. Kinnie Merlin regarding the heparin gtt. Per Dr. Kinnie Merlin restart heparin to what it was running at before procedure and recheck aPTT with the morning labs.

## 2023-01-18 LAB
ABSOLUTE EOS #: 0.18 K/UL (ref 0–0.44)
ABSOLUTE IMMATURE GRANULOCYTE: 0.03 K/UL (ref 0–0.3)
ABSOLUTE LYMPH #: 2.6 K/UL (ref 1.1–3.7)
ABSOLUTE MONO #: 0.95 K/UL (ref 0.1–1.2)
ANION GAP SERPL CALCULATED.3IONS-SCNC: 8 MMOL/L (ref 9–17)
BASOPHILS # BLD: 1 % (ref 0–2)
BASOPHILS ABSOLUTE: 0.08 K/UL (ref 0–0.2)
BUN BLDV-MCNC: 8 MG/DL (ref 6–20)
CALCIUM SERPL-MCNC: 8.7 MG/DL (ref 8.6–10.4)
CHLORIDE BLD-SCNC: 105 MMOL/L (ref 98–107)
CO2: 24 MMOL/L (ref 20–31)
CREAT SERPL-MCNC: 0.7 MG/DL (ref 0.7–1.2)
EOSINOPHILS RELATIVE PERCENT: 2 % (ref 1–4)
GFR SERPL CREATININE-BSD FRML MDRD: >60 ML/MIN/1.73M2
GLUCOSE BLD-MCNC: 177 MG/DL (ref 75–110)
GLUCOSE BLD-MCNC: 235 MG/DL (ref 75–110)
GLUCOSE BLD-MCNC: 261 MG/DL (ref 75–110)
GLUCOSE BLD-MCNC: 94 MG/DL (ref 70–99)
GLUCOSE BLD-MCNC: 96 MG/DL (ref 75–110)
HCT VFR BLD CALC: 44.4 % (ref 40.7–50.3)
HEMOGLOBIN: 13.3 G/DL (ref 13–17)
IMMATURE GRANULOCYTES: 0 %
INR BLD: 1.1
LYMPHOCYTES # BLD: 23 % (ref 24–43)
MAGNESIUM: 1.6 MG/DL (ref 1.6–2.6)
MCH RBC QN AUTO: 29.8 PG (ref 25.2–33.5)
MCHC RBC AUTO-ENTMCNC: 30 G/DL (ref 28.4–34.8)
MCV RBC AUTO: 99.6 FL (ref 82.6–102.9)
MONOCYTES # BLD: 8 % (ref 3–12)
NRBC AUTOMATED: 0 PER 100 WBC
PARTIAL THROMBOPLASTIN TIME: 54 SEC (ref 20.5–30.5)
PARTIAL THROMBOPLASTIN TIME: 61.3 SEC (ref 20.5–30.5)
PARTIAL THROMBOPLASTIN TIME: 61.8 SEC (ref 20.5–30.5)
PDW BLD-RTO: 13.5 % (ref 11.8–14.4)
PLATELET # BLD: 342 K/UL (ref 138–453)
PMV BLD AUTO: 10.5 FL (ref 8.1–13.5)
POTASSIUM SERPL-SCNC: 4 MMOL/L (ref 3.7–5.3)
PROTHROMBIN TIME: 12.1 SEC (ref 9.1–12.3)
RBC # BLD: 4.46 M/UL (ref 4.21–5.77)
SEG NEUTROPHILS: 66 % (ref 36–65)
SEGMENTED NEUTROPHILS ABSOLUTE COUNT: 7.43 K/UL (ref 1.5–8.1)
SODIUM BLD-SCNC: 137 MMOL/L (ref 135–144)
WBC # BLD: 11.3 K/UL (ref 3.5–11.3)

## 2023-01-18 PROCEDURE — 6370000000 HC RX 637 (ALT 250 FOR IP)

## 2023-01-18 PROCEDURE — 99231 SBSQ HOSP IP/OBS SF/LOW 25: CPT | Performed by: FAMILY MEDICINE

## 2023-01-18 PROCEDURE — 85025 COMPLETE CBC W/AUTO DIFF WBC: CPT

## 2023-01-18 PROCEDURE — 2580000003 HC RX 258: Performed by: STUDENT IN AN ORGANIZED HEALTH CARE EDUCATION/TRAINING PROGRAM

## 2023-01-18 PROCEDURE — 6370000000 HC RX 637 (ALT 250 FOR IP): Performed by: INTERNAL MEDICINE

## 2023-01-18 PROCEDURE — 6370000000 HC RX 637 (ALT 250 FOR IP): Performed by: NURSE PRACTITIONER

## 2023-01-18 PROCEDURE — 36415 COLL VENOUS BLD VENIPUNCTURE: CPT

## 2023-01-18 PROCEDURE — 6360000002 HC RX W HCPCS

## 2023-01-18 PROCEDURE — 80048 BASIC METABOLIC PNL TOTAL CA: CPT

## 2023-01-18 PROCEDURE — 6370000000 HC RX 637 (ALT 250 FOR IP): Performed by: STUDENT IN AN ORGANIZED HEALTH CARE EDUCATION/TRAINING PROGRAM

## 2023-01-18 PROCEDURE — 85730 THROMBOPLASTIN TIME PARTIAL: CPT

## 2023-01-18 PROCEDURE — 2060000000 HC ICU INTERMEDIATE R&B

## 2023-01-18 PROCEDURE — 83735 ASSAY OF MAGNESIUM: CPT

## 2023-01-18 PROCEDURE — 85610 PROTHROMBIN TIME: CPT

## 2023-01-18 PROCEDURE — 82947 ASSAY GLUCOSE BLOOD QUANT: CPT

## 2023-01-18 RX ORDER — CARVEDILOL 12.5 MG/1
12.5 TABLET ORAL ONCE
Status: COMPLETED | OUTPATIENT
Start: 2023-01-18 | End: 2023-01-18

## 2023-01-18 RX ORDER — CARVEDILOL 25 MG/1
25 TABLET ORAL 2 TIMES DAILY WITH MEALS
Status: DISCONTINUED | OUTPATIENT
Start: 2023-01-18 | End: 2023-01-22 | Stop reason: HOSPADM

## 2023-01-18 RX ORDER — WARFARIN SODIUM 7.5 MG/1
7.5 TABLET ORAL ONCE
Status: COMPLETED | OUTPATIENT
Start: 2023-01-18 | End: 2023-01-18

## 2023-01-18 RX ADMIN — AMLODIPINE BESYLATE 5 MG: 5 TABLET ORAL at 08:36

## 2023-01-18 RX ADMIN — LEVETIRACETAM 500 MG: 500 TABLET, FILM COATED ORAL at 08:36

## 2023-01-18 RX ADMIN — CARVEDILOL 25 MG: 25 TABLET, FILM COATED ORAL at 16:23

## 2023-01-18 RX ADMIN — Medication 1 TABLET: at 08:36

## 2023-01-18 RX ADMIN — MAGNESIUM SULFATE HEPTAHYDRATE 1000 MG: 1 INJECTION, SOLUTION INTRAVENOUS at 21:46

## 2023-01-18 RX ADMIN — LISINOPRIL 20 MG: 20 TABLET ORAL at 08:36

## 2023-01-18 RX ADMIN — FAMOTIDINE 20 MG: 20 TABLET, FILM COATED ORAL at 08:36

## 2023-01-18 RX ADMIN — HEPARIN SODIUM 21 UNITS/KG/HR: 10000 INJECTION, SOLUTION INTRAVENOUS at 08:26

## 2023-01-18 RX ADMIN — INSULIN LISPRO 4 UNITS: 100 INJECTION, SOLUTION INTRAVENOUS; SUBCUTANEOUS at 16:23

## 2023-01-18 RX ADMIN — FUROSEMIDE 40 MG: 40 TABLET ORAL at 08:36

## 2023-01-18 RX ADMIN — FAMOTIDINE 20 MG: 20 TABLET, FILM COATED ORAL at 20:01

## 2023-01-18 RX ADMIN — ASPIRIN 81 MG: 81 TABLET, CHEWABLE ORAL at 08:36

## 2023-01-18 RX ADMIN — MAGNESIUM SULFATE HEPTAHYDRATE 1000 MG: 1 INJECTION, SOLUTION INTRAVENOUS at 23:03

## 2023-01-18 RX ADMIN — WARFARIN SODIUM 7.5 MG: 7.5 TABLET ORAL at 17:46

## 2023-01-18 RX ADMIN — LACOSAMIDE 100 MG: 100 TABLET, FILM COATED ORAL at 08:36

## 2023-01-18 RX ADMIN — SODIUM CHLORIDE, POTASSIUM CHLORIDE, SODIUM LACTATE AND CALCIUM CHLORIDE: 600; 310; 30; 20 INJECTION, SOLUTION INTRAVENOUS at 16:23

## 2023-01-18 RX ADMIN — CARVEDILOL 12.5 MG: 12.5 TABLET, FILM COATED ORAL at 12:00

## 2023-01-18 RX ADMIN — LEVETIRACETAM 500 MG: 500 TABLET, FILM COATED ORAL at 20:00

## 2023-01-18 RX ADMIN — INSULIN GLARGINE 15 UNITS: 100 INJECTION, SOLUTION SUBCUTANEOUS at 20:30

## 2023-01-18 RX ADMIN — LACOSAMIDE 100 MG: 100 TABLET, FILM COATED ORAL at 20:00

## 2023-01-18 RX ADMIN — CARVEDILOL 12.5 MG: 12.5 TABLET, FILM COATED ORAL at 08:36

## 2023-01-18 RX ADMIN — SODIUM CHLORIDE, POTASSIUM CHLORIDE, SODIUM LACTATE AND CALCIUM CHLORIDE: 600; 310; 30; 20 INJECTION, SOLUTION INTRAVENOUS at 06:13

## 2023-01-18 RX ADMIN — ATORVASTATIN CALCIUM 80 MG: 80 TABLET, FILM COATED ORAL at 20:00

## 2023-01-18 ASSESSMENT — PAIN SCALES - GENERAL: PAINLEVEL_OUTOF10: 0

## 2023-01-18 NOTE — PROGRESS NOTES
Pharmacy Note  Warfarin Consult follow-up      Recent Labs     01/18/23  0604   INR 1.1     Recent Labs     01/16/23  0624 01/17/23  0306 01/18/23  0604   HGB 14.4 13.2 13.3   HCT 44.6 40.9 44.4    318 342       Significant Drug-Drug Interactions:  New warfarin drug-drug interactions: none  Discontinued drug-drug interactions: none    Date                INR        Dose   1/17/2023            1.1        5 mg  1/18                     1.1        7.5mg      Notes:                     Daily PT/INR while inpatient.      Vee Maddox PharmD, BCPS 1/18/2023 11:39 AM

## 2023-01-18 NOTE — PLAN OF CARE
Problem: Discharge Planning  Goal: Discharge to home or other facility with appropriate resources  Outcome: Progressing     Problem: Chronic Conditions and Co-morbidities  Goal: Patient's chronic conditions and co-morbidity symptoms are monitored and maintained or improved  Outcome: Progressing     Problem: Confusion  Goal: Confusion, delirium, dementia, or psychosis is improved or at baseline  Description: INTERVENTIONS:  1. Assess for possible contributors to thought disturbance, including medications, impaired vision or hearing, underlying metabolic abnormalities, dehydration, psychiatric diagnoses, and notify attending LIP  2. [place] high risk fall precautions, as indicated  3. Provide frequent short contacts to provide reality reorientation, refocusing and direction  4. Decrease environmental stimuli, including noise as appropriate  5. Monitor and intervene to maintain adequate nutrition, hydration, elimination, sleep and activity  6. If unable to ensure safety without constant attention obtain sitter and review sitter guidelines with assigned personnel  7. Initiate Psychosocial CNS and Spiritual Care consult, as indicated  Outcome: Progressing     Problem: Skin/Tissue Integrity  Goal: Absence of new skin breakdown  Description: 1. Monitor for areas of redness and/or skin breakdown  2. Assess vascular access sites hourly  3. Every 4-6 hours minimum:  Change oxygen saturation probe site  4. Every 4-6 hours:  If on nasal continuous positive airway pressure, respiratory therapy assess nares and determine need for appliance change or resting period.   Outcome: Progressing     Problem: Safety - Adult  Goal: Free from fall injury  Outcome: Progressing     Problem: ABCDS Injury Assessment  Goal: Absence of physical injury  Outcome: Progressing     Problem: Pain  Goal: Verbalizes/displays adequate comfort level or baseline comfort level  Outcome: Progressing     Problem: Respiratory - Adult  Goal: Achieves optimal ventilation and oxygenation  Outcome: Progressing

## 2023-01-18 NOTE — PROGRESS NOTES
[facility]  Progress Note    Date:   1/18/2023  Patient name:  Gely Watts  Date of admission:  1/7/2023  4:22 PM  MRN:   [id number]  YOB: 1965    SUBJECTIVE/Last 24 hours update:     Cardiology put patient on warfarin, currently bridging with heparin until INR therapeutic  Spoken to pharmacy yesterday -can be discharged Lovenox 1 mg/kg twice daily, patient can follow-up with Coumadin clinic to see when he reaches therapeutic INR  Patient wants to go home with home health care   Mg repeat ordered      HPI:     Patient is a 62 yo male admitted on 1/7/23 for AMS. Last well known was 1/5/23. Patient was found to be disheveled and having poor intake. In the ER patient was found to be hypertensive and elevated blood glucose levels (concern for DKA)  EKG showed STEMI, cardiology recommended starting the patient on Heparin gtt  CTA head showed severe stenosis in the left subclavian artery 2/2 noncalcified atheroscleotic plaque at the level of left vertebral artery. Patient was transferred to ICU for Hypertensive emergency and DKA management     In the ICU patient was assessed by cardiology and neurology and underwent cardiac cath which showed significant multivessel CAD, and patent grafts. Cardiology recommended continued medical therapy. Patient received Nitro drip for HTN emergency and was weaned off  Endovascular surgery was consulted for stenosis of subclavian artery but no intervention was recommended by them. Neurology was consulted for AMS did MRI brain which showed tiny acute infarcts in R temporal region per neurology not the cause of encephalopathy and so LTME was done which did not show electrographic seizures. REVIEW OF SYSTEMS:        Review of Systems   Constitutional:  Negative for activity change, appetite change and fatigue. HENT:  Negative for congestion. Eyes:  Negative for photophobia and visual disturbance. Respiratory:  Negative for apnea, cough, shortness of breath and wheezing. Cardiovascular:  Negative for chest pain, palpitations and leg swelling. Gastrointestinal:  Negative for abdominal distention, abdominal pain, constipation, diarrhea, nausea and vomiting. Endocrine: Negative for polyuria. Genitourinary:  Negative for difficulty urinating and urgency. Musculoskeletal:  Negative for arthralgias and back pain. Skin:  Negative for color change. Neurological:  Negative for dizziness, syncope and headaches. Psychiatric/Behavioral:  Negative for agitation, behavioral problems, decreased concentration and dysphoric mood. PAST MEDICAL HISTORY:      has a past medical history of CAD (coronary artery disease), Cardiac arrest ([facility] [address] Diabetes mellitus ([facility] [address] Hyperlipidemia, Hypertension, Osteolysis, PEA (Pulseless electrical activity) ([facility] [address] PVD (peripheral vascular disease) ([facility] [address] and Wound, open. PAST SURGICAL HISTORY:      has a past surgical history that includes Femur fracture surgery (Right, 2000); Cardiac catheterization (04/07/2019); Femur Surgery; Tonsillectomy (1974); Coronary artery bypass graft (04/07/2019); Hardware Removal (Right, 04/07/2019); Tibia fracture surgery (Right, 2000); Coronary artery bypass graft (N/A, 04/07/2019); RECONSTRUCTIVE REPAIR STERNAL (N/A, 04/08/2019); Hardware Removal (Right, 04/22/2019); REMOVE HARDWARE FEMUR (N/A, 04/22/2019); Carotid stent placement (07/03/2019); Leg amputation below knee (Right, 08/25/2019); Leg amputation below knee (Right, 08/27/2019); above knee amputation (Right, 09/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 SOCIAL HISTORY:      reports that he has been smoking cigarettes. He started smoking about 2 years ago. He has a 20.00 pack-year smoking history. He has never used smokeless tobacco. He reports that he does not drink alcohol and does not use drugs. FAMILY HISTORY:     family history includes Diabetes in his brother, father, and mother; Heart Disease in his brother, father, and mother; High Blood Pressure in his brother, father, and sister; Stroke in his father and mother. HOME MEDICATIONS:      Prior to Admission medications    Medication Sig Start Date End Date Taking? Authorizing Provider   metoprolol tartrate (LOPRESSOR) 25 MG tablet Take 2 tablets by mouth 2 times daily 8/24/22   Jerardo Carey MD   clopidogrel (PLAVIX) 75 MG tablet TAKE 1 TABLET BY MOUTH DAILY 2/17/22   Sissy Hair MD   gabapentin (NEURONTIN) 300 MG capsule TAKE 1 CAPSULE BY MOUTH EVERY 8 HOURS 2/17/22 3/19/22  Sissy Hair MD   metFORMIN (GLUCOPHAGE) 1000 MG tablet Take 1 tablet by mouth 2 times daily (with meals) 1/13/22 3/14/22  Sissy Hair MD   insulin lispro, 1 Unit Dial, (HUMALOG KWIKPEN) 100 UNIT/ML SOPN INJECT SUBCUTANEOUSLY BEFORE MEALS AS DIRECTED PER SLIDING SCALE: SEE ATTACHED FOR SLIDING SCALE DIRECTIONS: Hold all insulin and notify Dr immediately if BG <60. -200: 2 UNITS; -250: 4UNITS; 251-300 6 UNITS; 301-350: 8 UNITS  Patient not taking: Reported on 1/13/2022 1/5/22   Sissy Hair MD   lisinopril-hydroCHLOROthiazide (PRINZIDE;ZESTORETIC) 20-25 MG per tablet Take 2 tablets by mouth daily .   Take 1 pill if blood pressure is less than 130/80 12/23/21   Sissy Hair MD   insulin glargine (LANTUS SOLOSTAR) 100 UNIT/ML injection pen Inject 24 Units into the skin nightly 11/9/21 1/13/22  Yusra Vitale MD   Insulin Syringe-Needle U-100 30G X 5/16\" 1 ML MISC 1 each by Does not apply route daily 11/9/21   Yusra Vitale MD   atorvastatin (LIPITOR) 40 MG tablet Take 1 tablet by mouth nightly 10/19/21   Chelsea Colunga MD   metoprolol tartrate (LOPRESSOR) 50 MG tablet Take 1 tablet by mouth 2 times daily 10/19/21   Chelsea Colunga MD   blood glucose test strips (ONETOUCH VERIO) strip TEST 2 TIMES A DAY & AS NEEDED FOR SYMPTOMS OF IRREGULAR BLOOD GLUCOSE. 5/28/21   Gloria Maldonado MD Chet   Alcohol Swabs (B-D SINGLE USE SWABS REGULAR) PADS USE AS DIRECTED WITH TESTING SUPPLIES 5/28/21   Gerald Miller MD   Lancets (ONETOUCH DELICA PLUS AXTPST91T) [facility] USE AS DIRECTED TO TEST BLOOD SUGAR 5/28/21   Gerald Miller MD   sildenafil (VIAGRA) 100 MG tablet Take 1 tablet by mouth as needed for Erectile Dysfunction 1/7/21   Gerald Miller MD   Blood Glucose Monitoring Suppl (ONE TOUCH ULTRA 2) w/Device KIT U UTD ONCE D 9/27/19   Historical Provider, MD   acetaminophen (TYLENOL) 500 MG tablet Take 2 tablets by mouth every 8 hours as needed for Pain 9/30/19   Lon Reveles DO   Handicap Placard [address] by Does not apply route For 5 years 9/27/19   Gerald Miller MD   Blood Glucose Monitoring Suppl [facility] BLOOD GLUCOSE METER) w/Device KIT 1 kit by Does not apply route once for 1 dose 9/27/19 9/27/19  Gerald Miller MD   Insulin Pen Needle 32G X 6 MM MISC 1 each by Does not apply route daily 9/27/19   Gerald Miller MD   furosemide (LASIX) 20 MG tablet Take 1 tablet by mouth daily  Patient not taking: Reported on 11/16/2021 7/4/19   ISAAC Robles - CNP   aspirin 81 MG chewable tablet Take 1 tablet by mouth daily 5/10/19   Ami Torres MD   Multiple Vitamins-Minerals (THERAPEUTIC MULTIVITAMIN-MINERALS) tablet Take 1 tablet by mouth daily (with breakfast) 5/10/19   Ami Torres MD       ALLERGIES:     Patient has no known allergies. OBJECTIVE:       Vitals:    01/17/23 1935 01/17/23 2335 01/18/23 0330 01/18/23 0600   BP:  (!) 160/66 (!) 147/71    Pulse: 79 82 84    Resp:  20 18    Temp:  98.3 °F (36.8 °C) 98.4 °F (36.9 °C)    TempSrc:  Oral Oral    SpO2:       Weight:    137 lb 5.6 oz (62.3 kg)   Height:             Intake/Output Summary (Last 24 hours) at 1/18/2023 0749  Last data filed at 1/18/2023 0553  Gross per 24 hour   Intake --   Output 2700 ml   Net -2700 ml         PHYSICAL EXAM:  Physical Exam  Constitutional:       General: He is not in acute distress.      Appearance: Normal appearance. He is not ill-appearing. HENT:      Head: Normocephalic and atraumatic. Eyes:      Extraocular Movements: Extraocular movements intact. Cardiovascular:      Rate and Rhythm: Normal rate and regular rhythm. Pulses: Normal pulses. Pulmonary:      Effort: Pulmonary effort is normal. No respiratory distress. Breath sounds: No wheezing, rhonchi or rales. Abdominal:      General: Bowel sounds are normal. There is no distension. Palpations: Abdomen is soft. Tenderness: There is no abdominal tenderness. There is no guarding. Genitourinary:     Comments: Tate in place  Musculoskeletal:      Comments: S/p right AKA   Skin:     General: Skin is warm.    Neurological:      Comments: Unarousable    DIAGNOSTICS:     Laboratory Testing:    Recent Results (from the past 24 hour(s))   POC Glucose Fingerstick    Collection Time: 01/17/23  8:47 AM   Result Value Ref Range    POC Glucose 136 (H) 75 - 110 mg/dL   APTT    Collection Time: 01/17/23 10:49 AM   Result Value Ref Range    PTT 49.1 (H) 20.5 - 30.5 sec   Potassium    Collection Time: 01/17/23 10:57 AM   Result Value Ref Range    Potassium 3.6 (L) 3.7 - 5.3 mmol/L   POC Glucose Fingerstick    Collection Time: 01/17/23 11:00 AM   Result Value Ref Range    POC Glucose 207 (H) 75 - 110 mg/dL   Protime-INR    Collection Time: 01/17/23  2:28 PM   Result Value Ref Range    Protime 11.8 9.1 - 12.3 sec    INR 1.1    POC Glucose Fingerstick    Collection Time: 01/17/23  4:34 PM   Result Value Ref Range    POC Glucose 210 (H) 75 - 110 mg/dL   APTT    Collection Time: 01/17/23  5:47 PM   Result Value Ref Range    PTT 71.4 (H) 20.5 - 30.5 sec   POC Glucose Fingerstick    Collection Time: 01/17/23  9:00 PM   Result Value Ref Range    POC Glucose 146 (H) 75 - 110 mg/dL   APTT    Collection Time: 01/18/23 12:54 AM   Result Value Ref Range    PTT 61.3 (H) 20.5 - 30.5 sec   CBC with Auto Differential    Collection Time: 01/18/23  6:04 AM   Result Value Ref Range    WBC 11.3 3.5 - 11.3 k/uL    RBC 4.46 4.21 - 5.77 m/uL    Hemoglobin 13.3 13.0 - 17.0 g/dL    Hematocrit 44.4 40.7 - 50.3 %    MCV 99.6 82.6 - 102.9 fL    MCH 29.8 25.2 - 33.5 pg    MCHC 30.0 28.4 - 34.8 g/dL    RDW 13.5 11.8 - 14.4 %    Platelets 375 565 - 715 k/uL    MPV 10.5 8.1 - 13.5 fL    NRBC Automated 0.0 0.0 per 100 WBC    Seg Neutrophils 66 (H) 36 - 65 %    Lymphocytes 23 (L) 24 - 43 %    Monocytes 8 3 - 12 %    Eosinophils % 2 1 - 4 %    Basophils 1 0 - 2 %    Immature Granulocytes 0 0 %    Segs Absolute 7.43 1.50 - 8.10 k/uL    Absolute Lymph # 2.60 1.10 - 3.70 k/uL    Absolute Mono # 0.95 0.10 - 1.20 k/uL    Absolute Eos # 0.18 0.00 - 0.44 k/uL    Basophils Absolute 0.08 0.00 - 0.20 k/uL    Absolute Immature Granulocyte 0.03 0.00 - 0.30 k/uL   Basic Metabolic Panel    Collection Time: 01/18/23  6:04 AM   Result Value Ref Range    Glucose 94 70 - 99 mg/dL    BUN 8 6 - 20 mg/dL    Creatinine 0.70 0.70 - 1.20 mg/dL    Est, Glom Filt Rate >60 >60 mL/min/1.73m2    Calcium 8.7 8.6 - 10.4 mg/dL    Sodium 137 135 - 144 mmol/L    Potassium 4.0 3.7 - 5.3 mmol/L    Chloride 105 98 - 107 mmol/L    CO2 24 20 - 31 mmol/L    Anion Gap 8 (L) 9 - 17 mmol/L   Protime-INR    Collection Time: 01/18/23  6:04 AM   Result Value Ref Range    Protime 12.1 9.1 - 12.3 sec    INR 1.1          Imaging/Diagonstics:    Current Facility-Administered Medications   Medication Dose Route Frequency Provider Last Rate Last Admin    potassium chloride (KLOR-CON M) extended release tablet 40 mEq  40 mEq Oral PRN Dragan Govea MD        Or    potassium bicarb-citric acid (EFFER-K) effervescent tablet 40 mEq  40 mEq Oral PRN Dragan Govea MD        insulin lispro (HUMALOG) injection vial 0-8 Units  0-8 Units SubCUTAneous TID  Bryce Senior MD   2 Units at 01/17/23 1728    insulin lispro (HUMALOG) injection vial 0-4 Units  0-4 Units SubCUTAneous Nightly Bryce Senior MD        insulin glargine (LANTUS) injection vial 15 Units  15 Units SubCUTAneous Nightly Amber Lerma MD   15 Units at 01/17/23 2137    furosemide (LASIX) tablet 40 mg  40 mg Oral Daily ISAAC Pickering CNP        warfarin placeholder: dosing by pharmacy   Other RX Placeholder ISAAC Pickering CNP        lactated ringers infusion   IntraVENous Continuous Dupont Crea,  mL/hr at 01/18/23 0613 New Bag at 01/18/23 0613    amLODIPine (NORVASC) tablet 5 mg  5 mg Oral Daily IASAC Pickering CNP   5 mg at 01/17/23 0850    acetaminophen (TYLENOL) tablet 650 mg  650 mg Oral Q4H PRN Carlos Manuel Alford MD        famotidine (PEPCID) tablet 20 mg  20 mg Oral BID Prachi Coker MD   20 mg at 01/17/23 2019    nicotine (Gaylan Alert) 14 MG/24HR 1 patch  1 patch TransDERmal Daily Amber Lerma MD   1 patch at 01/15/23 0852    atorvastatin (LIPITOR) tablet 80 mg  80 mg Oral Nightly Dion Charles MD   80 mg at 01/17/23 2018    lacosamide (VIMPAT) tablet 100 mg  100 mg Oral BID ISAAC Saleh CNP   100 mg at 01/17/23 2018    levETIRAcetam (KEPPRA) tablet 500 mg  500 mg Oral BID Henrique ISAAC Young CNP   500 mg at 01/17/23 2018    carvedilol (COREG) tablet 12.5 mg  12.5 mg Oral BID  Oswaldo Javier, DO   12.5 mg at 01/17/23 1729    lisinopril (PRINIVIL;ZESTRIL) tablet 20 mg  20 mg Oral Daily Oswaldo Javier, DO   20 mg at 01/17/23 0850    bisacodyl (DULCOLAX) EC tablet 5 mg  5 mg Oral Daily PRN Brady Rangel MD        heparin 25,000 units in dextrose 5 % 250 mL infusion (rate based)  5-30 Units/kg/hr IntraVENous Continuous Ángela Bolanos MD 14.3 mL/hr at 01/17/23 1847 21 Units/kg/hr at 01/17/23 1847    glucose chewable tablet 16 g  4 tablet Oral PRN Temi Chung MD        dextrose bolus 10% 125 mL  125 mL IntraVENous PRN Temi Chung MD        Or    dextrose bolus 10% 250 mL  250 mL IntraVENous PRN Temi Chung MD        glucagon (rDNA) injection 1 mg  1 mg SubCUTAneous PRN Temi Chung MD        dextrose 10 % infusion   IntraVENous Continuous PRN Tamia Esqueda MD        heparin (porcine) injection 4,000 Units  4,000 Units IntraVENous PRN Giselle Gant MD   4,000 Units at 01/10/23 1538    heparin (porcine) injection 2,000 Units  2,000 Units IntraVENous PRN Giselle Gant MD   2,000 Units at 01/17/23 1211    aspirin chewable tablet 81 mg  81 mg Oral Daily Giselle Gant MD   81 mg at 01/17/23 0850    therapeutic multivitamin-minerals 1 tablet  1 tablet Oral Daily with breakfast Giselle Gant MD   1 tablet at 01/17/23 0850    dextrose bolus 10% 125 mL  125 mL IntraVENous PRN Giselle Gant MD   Stopped at 01/10/23 1139    Or    dextrose bolus 10% 250 mL  250 mL IntraVENous PRN Giselle Gant MD        potassium chloride 10 mEq/100 mL IVPB (Peripheral Line)  10 mEq IntraVENous PRN Giselle Gant  mL/hr at 01/16/23 1142 10 mEq at 01/16/23 1142    magnesium sulfate 1000 mg in dextrose 5% 100 mL IVPB  1,000 mg IntraVENous PRN Giselle Gant MD   Stopped at 01/17/23 1311    sodium phosphate 10 mmol in sodium chloride 0.9 % 250 mL IVPB  10 mmol IntraVENous PRN Giselle Gant MD   Stopped at 01/10/23 1327    Or    sodium phosphate 15 mmol in dextrose 5 % 250 mL IVPB  15 mmol IntraVENous PRN Giselle Gant MD   Stopped at 01/08/23 1315    Or    sodium phosphate 20 mmol in dextrose 5 % 500 mL IVPB  20 mmol IntraVENous PRROMELIA Gant MD        sodium chloride flush 0.9 % injection 5-40 mL  5-40 mL IntraVENous 2 times per day Giselle Gant MD   10 mL at 01/17/23 0850    sodium chloride flush 0.9 % injection 5-40 mL  5-40 mL IntraVENous PRN Giselle Gant MD        0.9 % sodium chloride infusion   IntraVENous PRN Giselle Gant MD   Stopped at 01/14/23 1603    ondansetron (ZOFRAN-ODT) disintegrating tablet 4 mg  4 mg Oral Q8H PRN Giselle Gant MD        Or    ondansetron (ZOFRAN) injection 4 mg  4 mg IntraVENous Q6H PRN Giselle Gant MD        polyethylene glycol (GLYCOLAX) packet 17 g  17 g Oral Daily PRN Vee Flores MD        acetaminophen (TYLENOL) tablet 650 mg  650 mg Oral Q6H PRN Vee Flores MD        Or    acetaminophen (TYLENOL) suppository 650 mg  650 mg Rectal Q6H PRN Vee Flores MD           ASSESSMENT:     Principal Problem:    Altered mental status  Active Problems:    DKA, type 2, not at goal [facility])    Stenosis of right middle cerebral artery    At risk of seizures    Cardiomyopathy, ischemic    Acute ischemic right MCA stroke ([address]    Acute metabolic encephalopathy    Cerebral multi-infarct state  Resolved Problems:    * No resolved hospital problems.  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   -Aspirin 81 mg daily and Lipitor 80 mg nightly  -Diet: NPO midnight  -Endovascular on board:              -no intervention, CTA is stable and L ICA and L VA occlusion are chronic and known before 2019              -F/u neuro endovascular clinic in 6 months  -Vascular on board:              -Patient SP subclavian artery stent, recovering well, continue anticoagulation-now patient on warfarin and being bridged with heparin until reaches therapeutic INR      Left ventricle apical thrombus 1.4 cm X0 0.6 cm, LVEF 35-40%, MV-CAD s/p PEA arrest, s/p CABG 2019  -ECHO 1/9: Left ventricle apical thrombus- On Heparin Gtt              -Will need to be transition to [facility] [place] at discharge  -Cardiology on board:              -S/p cardiac cath 1/13              -Continue heparin for LV thrombus, IV Lasix 40 mg twice daily, Coreg 12.5 mg twice daily, aspirin 81 mg daily, statin 40 mg nightly, lisinopril 20 mg daily   -Potassium normalized today, magnesium replaced yesterday, new measurement ordered      -Cardiac cath done 1/13 showed multivessel CAD              -OM1 has high origin with ostial 90% stenosis              -LCx: Small caliber vessel. Proximal and distal 70% stenosis              -99% stenosis at proximal left subclavian artery at origin of LIMA, leading to its underfilling. Systolic CHF  -EF 88-87%, moderate reduction in LV systolic function  -Continue IV lasix 40 mg BID, Coreg 12.5 mg BID, lisinopril 20 mg QD  -Cardiology on board-want to continue ACE inhibitor's, beta-blockers and IV diuresis with close monitoring of renal function. And continue aspirin and statin        T2DM  -Medium dose sliding scale  -Hypoglycemia protocol in place  -Glucose normalized  -Lantus to 15 units       AMS   -Neurology on board:              -No driving for 6 months              -Continue Keppra and Vimpat on discharge              -Signed off  -MRI brain showed tiny acute infarct   -LTME no seizure activty  -Continue ASA 81 mg QD, Lipitor 80 mg nightly  -Continue IV Keppra 500 mg BID and Vimpat 100 mg BID        Hypertension  -On coreg and Lisinopril      Gi Ppx: IV Pepcid 20 mg twice daily  DVT Ppx: on heparin and warfarin       Vangie Ruiz MD  Family Medicine Resident  1/18/2023 7:49 AM            Please note that this chart was generated using voice recognition Dragon dictation software.   Although every effort was made to ensure the accuracy of this automated transcription, some errors in transcription may have occurred

## 2023-01-18 NOTE — PROGRESS NOTES
[facility]   Progress Note                   Date:   1/18/2023  Patient name: Mikayla Blum  Date of admission:  1/7/2023  4:22 PM  MRN:   [id number]  YOB: 1965  PCP: Gualberto Chairez MD    Reason for Admission:      Subjective:       Patient seen and examined in room after discussion with RN. He denies chest pain or shortness of breath. Tele/vitals/labs reviewed. Remains on heparin gtt. Medications:   Scheduled Meds:   warfarin  7.5 mg Oral Once    insulin lispro  0-8 Units SubCUTAneous TID WC    insulin lispro  0-4 Units SubCUTAneous Nightly    insulin glargine  15 Units SubCUTAneous Nightly    furosemide  40 mg Oral Daily    warfarin placeholder: dosing by pharmacy   Other RX Placeholder    amLODIPine  5 mg Oral Daily    famotidine  20 mg Oral BID    nicotine  1 patch TransDERmal Daily    atorvastatin  80 mg Oral Nightly    lacosamide  100 mg Oral BID    levETIRAcetam  500 mg Oral BID    carvedilol  12.5 mg Oral BID WC    lisinopril  20 mg Oral Daily    aspirin  81 mg Oral Daily    therapeutic multivitamin-minerals  1 tablet Oral Daily with breakfast    sodium chloride flush  5-40 mL IntraVENous 2 times per day       Continuous Infusions:   lactated ringers 100 mL/hr at 01/18/23 0613    heparin (PORCINE) Infusion 21 Units/kg/hr (01/18/23 0826)    dextrose      sodium chloride Stopped (01/14/23 1603)       CBC:   Recent Labs     01/16/23  0624 01/17/23  0306 01/18/23  0604   WBC 11.0 9.9 11.3   HGB 14.4 13.2 13.3    318 342       BMP:    Recent Labs     01/16/23  0624 01/16/23  1253 01/17/23  0306 01/17/23  1057 01/18/23  0604     --  132*  --  137   K 2.9*   < > 3.4* 3.6* 4.0     --  98  --  105   CO2 27  --  25  --  24   BUN 9  --  8  --  8   CREATININE 0.92  --  0.83  --  0.70   GLUCOSE 71  --  323*  --  94    < > = values in this interval not displayed. Hepatic: No results for input(s): AST, ALT, ALB, BILITOT, ALKPHOS in the last 72 hours.   Troponin: No results for input(s): TROPONINI in the last 72 hours. BNP: No results for input(s): BNP in the last 72 hours. Lipids: No results for input(s): CHOL, HDL in the last 72 hours. Invalid input(s): LDLCALCU  INR:   Recent Labs     23  1428 23  0604   INR 1.1 1.1       Objective:   Vitals: BP (!) 157/71   Pulse 88   Temp 97.5 °F (36.4 °C) (Axillary)   Resp 29   Ht 5' 5\" (1.651 m)   Wt 137 lb 5.6 oz (62.3 kg)   SpO2 95%   BMI 22.86 kg/m²     General appearance: awake, altered, in no apparent respiratory distress   HEENT: Head: Normocephalic, no lesions, without obvious abnormality  Neck: no JVD  Lungs: clear to auscultation bilaterally, no basilar rales, no wheezing   Heart: regular rate and rhythm, S1, S2 normal, no murmur, click, rub or gallop, right groin no hematoma or bleeding noted  Abdomen: soft, non-tender; bowel sounds normal, gamino in place. Extremities: Right AKA , no LLE edema  Neurologic: not done. EK/10/23  Sinus tachycardia, q wave in anterior leads    Echocardiogram:  23  Summary  Left ventricle is normal in size. Global left ventricular systolic function  is moderately reduced. Visually estimated EF 35-40%. Global hypokinesis  worse in the apex and all apical segments. Definity used to improve delineation of left ventricular endocardial border. LV apical thrombus is noted. Measures 1.4cm x 0.6cm. Evidence of diastolic dysfunction. Thickened mitral valve leaflets. Trivial mitral regurgitation. Coronary Angiography:   19   Procedure Summary      1. Multivessel coronary artery disease. 2. Severe LV systolic dysfunction. 3. Mild aortic insufficiency   4. Distal aorta 40% stenosis. 5. Occluded left external iliac artery. 6. Severe diffuse disease of right iliac artery. Unable to pass IABP or   Impella      Recommendations      Medical therapy as needed. Risk factor modification. Routine Post Diagnostic Cath orders. Urgent surgical CABG. Discussed with CT surgery. IV lasix. CATH Procedure Summary 1/13/2023        1. Multivessel CAD    2. Patent SVG-OM1/OM3 and SVG-RPDA grafts    3. Critical stenosis of left subclavian artery proximal to LIMA origin. LIMA-LAD appears to be patent on left coronary angiogram.    4. Overall preserved LV systolic function        Recommendations        Routine Post Diagnostic Cath orders. Medical therapy    Vascular surgery consult for consideration of subclavian artery stent         Assessment / Acute Cardiac Problems:   H/o MVCAD s/p CABG [LIMA-LAD, SVG-PDA,OM 1 & distal circumflex) 4/7/2019  Ischemic cardiomyopathy  LV thrombus 1/4 x 0.6 cm  Diabetes type II, in DKA - resolved  H/o CVA  ALMITA  Left subclavian stenosis  Flash pulmonary edema  Hypertensive emergency  Subacute right temporal ischemic stroke, AMS with receptive aphasia  PVD   12. ICA and L VA occlusion are chronic and known before 2019- R ICA stent is patent- endovascular following         Plan of Treatment:   S/p cardiac cath as above with Critical stenosis of left subclavian artery proximal to LIMA origin noted on cath -s/p subclavian stent placement per vascular. Stable. Continue heparin gtt. With bridging with coumadin for LV thrombus. INR today 1.1 - Goal INR 2-3  Ischemic cardiomyopathy with ejection fraction 35 to 40% clinically not in fluid overload -continue ACE , beta-blocker and Lasix. Daily weight. Continue on ASA and satin  Bp elevated. Increase Coreg to 25mg BID and continue Norvasc & Lisinopril. Keep K>4, Mg>2  Stable for discharge from CV standpoint once INR at goal. Per notes patient is refusing SNF. F/U in clinic after discharge.        Alex Hester, [address] [facility]   Pager - [phone]

## 2023-01-18 NOTE — PROGRESS NOTES
Endovascular Neurosurgery Progress Note    SUBJECTIVE:   Patient was awake and alert, patient stated he feels normal, no complaints. He is in good spirit today    Review of Systems:  CONSTITUTIONAL:  negative for fevers, chills, fatigue and malaise    EYES:  negative for double vision, blurred vision and photophobia     HEENT:  negative for tinnitus, epistaxis and sore throat    RESPIRATORY:  negative for cough, shortness of breath, wheezing    CARDIOVASCULAR:  negative for chest pain, palpitations, syncope, edema    GASTROINTESTINAL:  negative for nausea, vomiting    GENITOURINARY:  negative for incontinence    MUSCULOSKELETAL:  negative for neck or back pain    NEUROLOGICAL:  Negative for weakness and tingling  negative for headaches and dizziness    PSYCHIATRIC:  negative for anxiety      Review of systems otherwise negative. OBJECTIVE:     Vitals:    23 1614   BP: (!) 179/68   Pulse: 83   Resp: 15   Temp: 97.5 °F (36.4 °C)   SpO2: 96%        General:  Gen: normal habitus, NAD  HEENT: NCAT, mucosa moist  Cvs: RRR, S1 S2 normal  Resp: symmetric unlabored breathing  Abd: s/nd/nt  Ext: no edema  Skin: no lesions seen, warm and dry    Neuro:  Gen: Disoriented to Time, Place and Person  Lang/speech: Follows commands   CN: PERRL, EOMI, VFF, V1-3 intact, mild right face droop, hearing intact  Motor: grossly 5/5 UE and LE on the left, right arm 4/5, right leg amputated above knee 1/5  Sense: decrease on the left  Coord: deferred  DTR: deferred  Gait: deferred    NIH Stroke Scale:   1a  Level of consciousness: 0   1b. LOC questions:  0 - answers both questions correctly   1c. LOC commands: 0 - performs both tasks correctly   2. Best Gaze: 0 - normal   3. Visual: 0 - no visual loss   4. Facial Palsy: 0 - normal symmetric movement   5a. Motor left arm: 0 - no drift, limb holds 90 (or 45) degrees for full 10 seconds   5b. Motor right arm: 0   6a.  Motor left le - no drift; leg holds 30 degree position for full 5 seconds   6b  Motor right leg:  3 - no effort against gravity; leg falls to bed immediately (pt has R AKA)   7. Limb Ataxia: 0 - absent   8. Sensory: 0   9. Best Language:  0   10. Dysarthria: 0 - normal   11. Extinction and Inattention: 0 - no abnormality         Total:   3     MRS: 05 (baseline due to R AKA)      LABS:   Reviewed. Lab Results   Component Value Date    HGB 13.3 01/18/2023    WBC 11.3 01/18/2023     01/18/2023     01/18/2023    BUN 8 01/18/2023    CREATININE 0.70 01/18/2023    AST 16 01/07/2023    ALT 18 01/07/2023    MG 1.6 01/17/2023    APTT 54.0 (H) 01/18/2023    INR 1.1 01/18/2023      No results found for: COVID19    RADIOLOGY:   Images were personally reviewed including:   Cerebral angiogram 7/3/2019:  1. Right proximal immediately after the origin cervical ICA atherosclerotic changes resulting into a focal area of stenosis measuring approximately 65% per NASCET criteria. 2.  The above mentioned stenosis was treated with post balloon angioplasty and stenting using a 7-10x 30 mm Acculink stent with no significant residual stenosis. 3.  Right common femoral artery severe stenosis with good collaterals. CTA head neck 1/7/23: R ICA Acculink stent patent and L ICA chronic occlusion seen before 2019. Dominant R VA and chronically occluded L VA       MRI brain w/o contrast 1/8/23: punctate right temporal periventricular stroke. 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 Patient presented to the ED with AMS and found to have glucose of 620 and possible sepsis. Endovascular consulted for chronic L ICA and L VA occlusion and hx of R ICA stent in 2019.     Neuro exam was stable    L SA origin stent on the 1/16/2023  PLAN:   - no intervention from neuroendovascular standpoint, CTA is stable and L ICA and L VA occlusion are chronic and known before 2019  - R ICA stent is patent  - patient has a punctate right temporal periventricular stroke, he is currently already on aspirin, recommend continue it  - patient is on heparin for ACS/Left ventricle thrombus, cardiology managing  - ICU management   - PT OT and SLP  - F/up with Neuro endovascular clinic in 6 months with Dr Trisha Leonardo       Case discussed with Dr. Trisha Leonardo attending.     Iain Wynn MD  Stroke, [facility]  [id number]  Electronically signed 1/18/2023 at 4:51 PM

## 2023-01-19 LAB
ABSOLUTE EOS #: 0.16 K/UL (ref 0–0.44)
ABSOLUTE IMMATURE GRANULOCYTE: 0.03 K/UL (ref 0–0.3)
ABSOLUTE LYMPH #: 3.01 K/UL (ref 1.1–3.7)
ABSOLUTE MONO #: 0.96 K/UL (ref 0.1–1.2)
ANION GAP SERPL CALCULATED.3IONS-SCNC: 9 MMOL/L (ref 9–17)
BASOPHILS # BLD: 1 % (ref 0–2)
BASOPHILS ABSOLUTE: 0.07 K/UL (ref 0–0.2)
BUN BLDV-MCNC: 10 MG/DL (ref 6–20)
CALCIUM SERPL-MCNC: 8.5 MG/DL (ref 8.6–10.4)
CHLORIDE BLD-SCNC: 105 MMOL/L (ref 98–107)
CO2: 26 MMOL/L (ref 20–31)
CREAT SERPL-MCNC: 0.71 MG/DL (ref 0.7–1.2)
CULTURE: NORMAL
CULTURE: NORMAL
EOSINOPHILS RELATIVE PERCENT: 1 % (ref 1–4)
GFR SERPL CREATININE-BSD FRML MDRD: >60 ML/MIN/1.73M2
GLUCOSE BLD-MCNC: 107 MG/DL (ref 75–110)
GLUCOSE BLD-MCNC: 113 MG/DL (ref 70–99)
GLUCOSE BLD-MCNC: 272 MG/DL (ref 75–110)
GLUCOSE BLD-MCNC: 339 MG/DL (ref 75–110)
GLUCOSE BLD-MCNC: 83 MG/DL (ref 75–110)
HCT VFR BLD CALC: 41 % (ref 40.7–50.3)
HEMOGLOBIN: 13.1 G/DL (ref 13–17)
IMMATURE GRANULOCYTES: 0 %
INR BLD: 1.3
LYMPHOCYTES # BLD: 25 % (ref 24–43)
Lab: NORMAL
Lab: NORMAL
MAGNESIUM: 2 MG/DL (ref 1.6–2.6)
MCH RBC QN AUTO: 29.6 PG (ref 25.2–33.5)
MCHC RBC AUTO-ENTMCNC: 32 G/DL (ref 28.4–34.8)
MCV RBC AUTO: 92.6 FL (ref 82.6–102.9)
MONOCYTES # BLD: 8 % (ref 3–12)
NRBC AUTOMATED: 0 PER 100 WBC
PARTIAL THROMBOPLASTIN TIME: 50 SEC (ref 20.5–30.5)
PDW BLD-RTO: 13.6 % (ref 11.8–14.4)
PLATELET # BLD: 341 K/UL (ref 138–453)
PMV BLD AUTO: 10.8 FL (ref 8.1–13.5)
POTASSIUM SERPL-SCNC: 3.5 MMOL/L (ref 3.7–5.3)
PROTHROMBIN TIME: 13.2 SEC (ref 9.1–12.3)
RBC # BLD: 4.43 M/UL (ref 4.21–5.77)
SEG NEUTROPHILS: 65 % (ref 36–65)
SEGMENTED NEUTROPHILS ABSOLUTE COUNT: 7.99 K/UL (ref 1.5–8.1)
SODIUM BLD-SCNC: 140 MMOL/L (ref 135–144)
SPECIMEN DESCRIPTION: NORMAL
SPECIMEN DESCRIPTION: NORMAL
WBC # BLD: 12.2 K/UL (ref 3.5–11.3)

## 2023-01-19 PROCEDURE — 99231 SBSQ HOSP IP/OBS SF/LOW 25: CPT | Performed by: FAMILY MEDICINE

## 2023-01-19 PROCEDURE — 2060000000 HC ICU INTERMEDIATE R&B

## 2023-01-19 PROCEDURE — 6370000000 HC RX 637 (ALT 250 FOR IP): Performed by: NURSE PRACTITIONER

## 2023-01-19 PROCEDURE — 82947 ASSAY GLUCOSE BLOOD QUANT: CPT

## 2023-01-19 PROCEDURE — 85730 THROMBOPLASTIN TIME PARTIAL: CPT

## 2023-01-19 PROCEDURE — 6370000000 HC RX 637 (ALT 250 FOR IP)

## 2023-01-19 PROCEDURE — 2580000003 HC RX 258: Performed by: STUDENT IN AN ORGANIZED HEALTH CARE EDUCATION/TRAINING PROGRAM

## 2023-01-19 PROCEDURE — 83735 ASSAY OF MAGNESIUM: CPT

## 2023-01-19 PROCEDURE — 6370000000 HC RX 637 (ALT 250 FOR IP): Performed by: INTERNAL MEDICINE

## 2023-01-19 PROCEDURE — 97110 THERAPEUTIC EXERCISES: CPT

## 2023-01-19 PROCEDURE — 85610 PROTHROMBIN TIME: CPT

## 2023-01-19 PROCEDURE — 6360000002 HC RX W HCPCS

## 2023-01-19 PROCEDURE — 97116 GAIT TRAINING THERAPY: CPT

## 2023-01-19 PROCEDURE — 80048 BASIC METABOLIC PNL TOTAL CA: CPT

## 2023-01-19 PROCEDURE — 6370000000 HC RX 637 (ALT 250 FOR IP): Performed by: STUDENT IN AN ORGANIZED HEALTH CARE EDUCATION/TRAINING PROGRAM

## 2023-01-19 PROCEDURE — 6370000000 HC RX 637 (ALT 250 FOR IP): Performed by: FAMILY MEDICINE

## 2023-01-19 PROCEDURE — 85025 COMPLETE CBC W/AUTO DIFF WBC: CPT

## 2023-01-19 PROCEDURE — 2580000003 HC RX 258

## 2023-01-19 PROCEDURE — 36415 COLL VENOUS BLD VENIPUNCTURE: CPT

## 2023-01-19 PROCEDURE — 97535 SELF CARE MNGMENT TRAINING: CPT

## 2023-01-19 RX ORDER — WARFARIN SODIUM 7.5 MG/1
7.5 TABLET ORAL ONCE
Status: COMPLETED | OUTPATIENT
Start: 2023-01-19 | End: 2023-01-19

## 2023-01-19 RX ORDER — HYDRALAZINE HYDROCHLORIDE 20 MG/ML
5 INJECTION INTRAMUSCULAR; INTRAVENOUS ONCE
Status: COMPLETED | OUTPATIENT
Start: 2023-01-19 | End: 2023-01-19

## 2023-01-19 RX ORDER — AMLODIPINE BESYLATE 10 MG/1
10 TABLET ORAL DAILY
Status: DISCONTINUED | OUTPATIENT
Start: 2023-01-19 | End: 2023-01-22 | Stop reason: HOSPADM

## 2023-01-19 RX ADMIN — LEVETIRACETAM 500 MG: 500 TABLET, FILM COATED ORAL at 08:23

## 2023-01-19 RX ADMIN — FUROSEMIDE 40 MG: 40 TABLET ORAL at 08:23

## 2023-01-19 RX ADMIN — AMLODIPINE BESYLATE 10 MG: 10 TABLET ORAL at 08:23

## 2023-01-19 RX ADMIN — FAMOTIDINE 20 MG: 20 TABLET, FILM COATED ORAL at 08:23

## 2023-01-19 RX ADMIN — CARVEDILOL 25 MG: 25 TABLET, FILM COATED ORAL at 08:23

## 2023-01-19 RX ADMIN — HEPARIN SODIUM 21 UNITS/KG/HR: 10000 INJECTION, SOLUTION INTRAVENOUS at 04:00

## 2023-01-19 RX ADMIN — POTASSIUM CHLORIDE 40 MEQ: 1500 TABLET, EXTENDED RELEASE ORAL at 08:23

## 2023-01-19 RX ADMIN — WARFARIN SODIUM 7.5 MG: 7.5 TABLET ORAL at 18:16

## 2023-01-19 RX ADMIN — FAMOTIDINE 20 MG: 20 TABLET, FILM COATED ORAL at 20:28

## 2023-01-19 RX ADMIN — CARVEDILOL 25 MG: 25 TABLET, FILM COATED ORAL at 15:54

## 2023-01-19 RX ADMIN — ASPIRIN 81 MG: 81 TABLET, CHEWABLE ORAL at 08:23

## 2023-01-19 RX ADMIN — SODIUM CHLORIDE, POTASSIUM CHLORIDE, SODIUM LACTATE AND CALCIUM CHLORIDE: 600; 310; 30; 20 INJECTION, SOLUTION INTRAVENOUS at 13:19

## 2023-01-19 RX ADMIN — LACOSAMIDE 100 MG: 100 TABLET, FILM COATED ORAL at 08:23

## 2023-01-19 RX ADMIN — SODIUM CHLORIDE, POTASSIUM CHLORIDE, SODIUM LACTATE AND CALCIUM CHLORIDE: 600; 310; 30; 20 INJECTION, SOLUTION INTRAVENOUS at 03:00

## 2023-01-19 RX ADMIN — INSULIN LISPRO 6 UNITS: 100 INJECTION, SOLUTION INTRAVENOUS; SUBCUTANEOUS at 12:18

## 2023-01-19 RX ADMIN — HYDRALAZINE HYDROCHLORIDE 5 MG: 20 INJECTION INTRAMUSCULAR; INTRAVENOUS at 01:04

## 2023-01-19 RX ADMIN — LEVETIRACETAM 500 MG: 500 TABLET, FILM COATED ORAL at 20:28

## 2023-01-19 RX ADMIN — ATORVASTATIN CALCIUM 80 MG: 80 TABLET, FILM COATED ORAL at 20:28

## 2023-01-19 RX ADMIN — Medication 1 TABLET: at 08:23

## 2023-01-19 RX ADMIN — INSULIN GLARGINE 15 UNITS: 100 INJECTION, SOLUTION SUBCUTANEOUS at 20:27

## 2023-01-19 RX ADMIN — SODIUM CHLORIDE, PRESERVATIVE FREE 10 ML: 5 INJECTION INTRAVENOUS at 20:29

## 2023-01-19 RX ADMIN — LACOSAMIDE 100 MG: 100 TABLET, FILM COATED ORAL at 20:28

## 2023-01-19 RX ADMIN — LISINOPRIL 20 MG: 20 TABLET ORAL at 08:23

## 2023-01-19 NOTE — PLAN OF CARE
Problem: Discharge Planning  Goal: Discharge to home or other facility with appropriate resources  1/19/2023 0141 by Hailey Rodriguez RN  Outcome: Progressing  1/18/2023 1811 by Rene Parra RN  Outcome: Progressing     Problem: Chronic Conditions and Co-morbidities  Goal: Patient's chronic conditions and co-morbidity symptoms are monitored and maintained or improved  1/19/2023 0141 by Hailey Rodriguez RN  Outcome: Progressing  1/18/2023 1811 by Rene Parra RN  Outcome: Progressing     Problem: Confusion  Goal: Confusion, delirium, dementia, or psychosis is improved or at baseline  Description: INTERVENTIONS:  1. Assess for possible contributors to thought disturbance, including medications, impaired vision or hearing, underlying metabolic abnormalities, dehydration, psychiatric diagnoses, and notify attending LIP  2. [place] high risk fall precautions, as indicated  3. Provide frequent short contacts to provide reality reorientation, refocusing and direction  4. Decrease environmental stimuli, including noise as appropriate  5. Monitor and intervene to maintain adequate nutrition, hydration, elimination, sleep and activity  6. If unable to ensure safety without constant attention obtain sitter and review sitter guidelines with assigned personnel  7. Initiate Psychosocial CNS and Spiritual Care consult, as indicated  1/19/2023 0141 by Hailey Rodriguez RN  Outcome: Progressing  1/18/2023 1811 by Rene Parra RN  Outcome: Progressing     Problem: Skin/Tissue Integrity  Goal: Absence of new skin breakdown  Description: 1. Monitor for areas of redness and/or skin breakdown  2. Assess vascular access sites hourly  3. Every 4-6 hours minimum:  Change oxygen saturation probe site  4. Every 4-6 hours:  If on nasal continuous positive airway pressure, respiratory therapy assess nares and determine need for appliance change or resting period.   1/19/2023 0141 by Hailey Rodriguez RN  Outcome: Progressing  1/18/2023 1811 by Laurent Lorenzo RN  Outcome: Progressing     Problem: Safety - Adult  Goal: Free from fall injury  1/19/2023 0141 by Tiffany Coy RN  Outcome: Progressing  1/18/2023 1811 by Laurent Lorenzo RN  Outcome: Progressing     Problem: ABCDS Injury Assessment  Goal: Absence of physical injury  1/19/2023 0141 by Tiffany Coy RN  Outcome: Progressing  1/18/2023 1811 by Laurent Lorenzo RN  Outcome: Progressing     Problem: Pain  Goal: Verbalizes/displays adequate comfort level or baseline comfort level  1/19/2023 0141 by Tiffany Coy RN  Outcome: Progressing  1/18/2023 1811 by Laurent Lorenzo RN  Outcome: Progressing     Problem: Respiratory - Adult  Goal: Achieves optimal ventilation and oxygenation  1/19/2023 0141 by Tiffany Coy RN  Outcome: Progressing  1/18/2023 1811 by Laurent Lorenzo RN  Outcome: Progressing

## 2023-01-19 NOTE — PROGRESS NOTES
Comprehensive Nutrition Assessment    Type and Reason for Visit:  Reassess    Nutrition Recommendations/Plan:   Recommend SLP re-evaluate pt for appropriate diet consistency d/t previous MBSS. Continue oral diet as appropriate/tolerated. Will monitor labs, weights, and plan of care. Malnutrition Assessment:  Malnutrition Status:  Insufficient data (01/13/23 1442)    Context:  Chronic Illness     Findings of the 6 clinical characteristics of malnutrition:  Energy Intake:  Unable to assess  Weight Loss:  Mild weight loss - 16% x 14 months    Body Fat Loss:  Unable to assess   Muscle Mass Loss:  Unable to assess  Fluid Accumulation:  Unable to assess   Strength:  Not Performed    Nutrition Assessment:    Pt with staff being cleaned up at visit. Noted pt currently receiving a Regular diet. Per chart review, pt previously was on a minced and moist diet with mildly thick liquids per MBSS/SLP recommendations. Suggest SLP re-evaluate to determine appropriate diet consistency. RN reports pt has been eating great. Per chart review, recorded PO intakes of % noted. Labs reviewed: K 3.5 mmol/L. Meds include: Lasix, Lantus, Humalog SS, MVI. Weight fluctuations since admission noted. Nutrition Related Findings:    meds/labs reviewed; hx of R AKA (from 2009) Wound Type: None       Current Nutrition Intake & Therapies:    Average Meal Intake: %  Average Supplements Intake: None Ordered  ADULT DIET; Regular    Anthropometric Measures:  Height: 5' 5\" (165.1 cm)  Ideal Body Weight (IBW): 136 lbs (62 kg)    Admission Body Weight: 149 lb 11.1 oz (67.9 kg)  Current Body Weight: 139 lb 8.8 oz (63.3 kg), 102.6 % IBW.  Weight Source: Bed Scale  Current BMI (kg/m2): 23.2  Usual Body Weight: 178 lb 12.7 oz (81.1 kg) (11/16/21)  % Weight Change (Calculated): -16.3  Weight Adjustment For: Amputation  Total Adjusted Percentage (Calculated): 10.1  Adjusted Ideal Body Weight (lbs) (Calculated): 122.3 lbs  Adjusted Ideal Body Weight (kg) (Calculated): 55.59 kg  Adjusted % Ideal Body Weight (Calculated): 114.1  Adjusted BMI (kg/m2) (Calculated): 25.5  BMI Categories: Overweight (BMI 25.0-29. 9) (with adjusted BMI for amputation)    Estimated Daily Nutrient Needs:  Energy Requirements Based On: Kcal/kg  Weight Used for Energy Requirements: Current  Energy (kcal/day): 5914-8659 kcals/day  Weight Used for Protein Requirements: Current  Protein (g/day): 75-90 gm pro/day  Method Used for Fluid Requirements: Other (Comment)  Fluid (ml/day): per MD    Nutrition Diagnosis:   Predicted inadequate energy intake related to  (unknown etiology) as evidenced by weight loss (of 16% x 14 months per EHR)    Nutrition Interventions:   Food and/or Nutrient Delivery:  (Recommend SLP re-evaluate pt for appropriate diet consistency d/t previous MBSS.)  Nutrition Education/Counseling: No recommendation at this time  Coordination of Nutrition Care: Continue to monitor while inpatient, Speech Therapy, Swallow Evaluation  Plan of Care discussed with: RN    Goals:  Previous Goal Met: Progressing toward Goal(s)  Goals: Meet at least 75% of estimated needs, prior to discharge       Nutrition Monitoring and Evaluation:   Behavioral-Environmental Outcomes: None Identified  Food/Nutrient Intake Outcomes: Food and Nutrient Intake  Physical Signs/Symptoms Outcomes: Biochemical Data, Chewing or Swallowing, GI Status, Skin, Weight, Nutrition Focused Physical Findings    Discharge Planning:     Too soon to determine     Deandra London [address] [place] [address],   Contact: [phone]

## 2023-01-19 NOTE — PROGRESS NOTES
[facility]  Progress Note    Date:   1/19/2023  Patient name:  Rhina Booth  Date of admission:  1/7/2023  4:22 PM  MRN:   [id number]  YOB: 1965    SUBJECTIVE/Last 24 hours update:     Patient seen and examined at bedside  He is doing well  Cardiology waiting for INR to be in range before discharge  Patient had 180/75 BP overnight, given hydralazine and norvasc increased to 10  Mg yesterday 1.6, continue replacement   Potassium 3.5 today, replace    HPI:     Patient is a 62 yo male admitted on 1/7/23 for AMS. Last well known was 1/5/23. Patient was found to be disheveled and having poor intake. In the ER patient was found to be hypertensive and elevated blood glucose levels (concern for DKA)  EKG showed STEMI, cardiology recommended starting the patient on Heparin gtt  CTA head showed severe stenosis in the left subclavian artery 2/2 noncalcified atheroscleotic plaque at the level of left vertebral artery. Patient was transferred to ICU for Hypertensive emergency and DKA management     In the ICU patient was assessed by cardiology and neurology and underwent cardiac cath which showed significant multivessel CAD, and patent grafts. Cardiology recommended continued medical therapy. Patient received Nitro drip for HTN emergency and was weaned off  Endovascular surgery was consulted for stenosis of subclavian artery but no intervention was recommended by them. Neurology was consulted for AMS did MRI brain which showed tiny acute infarcts in R temporal region per neurology not the cause of encephalopathy and so LTME was done which did not show electrographic seizures. REVIEW OF SYSTEMS:        Review of Systems   Constitutional:  Negative for activity change, appetite change and fatigue. HENT:  Negative for congestion. Eyes:  Negative for photophobia and visual disturbance.    Respiratory:  Negative for apnea, cough, shortness of breath and wheezing. Cardiovascular:  Negative for chest pain, palpitations and leg swelling. Gastrointestinal:  Negative for abdominal distention, abdominal pain, constipation, diarrhea, nausea and vomiting. Endocrine: Negative for polyuria. Genitourinary:  Negative for difficulty urinating and urgency. Musculoskeletal:  Negative for arthralgias and back pain. Skin:  Negative for color change. Neurological:  Negative for dizziness, syncope and headaches. Psychiatric/Behavioral:  Negative for agitation, behavioral problems, decreased concentration and dysphoric mood. PAST MEDICAL HISTORY:      has a past medical history of CAD (coronary artery disease), Cardiac arrest ([facility] [address] Diabetes mellitus ([facility] [address] Hyperlipidemia, Hypertension, Osteolysis, PEA (Pulseless electrical activity) ([facility] [address] PVD (peripheral vascular disease) ([facility] [address] and Wound, open. PAST SURGICAL HISTORY:      has a past surgical history that includes Femur fracture surgery (Right, 2000); Cardiac catheterization (04/07/2019); Femur Surgery; Tonsillectomy (1974); Coronary artery bypass graft (04/07/2019); Hardware Removal (Right, 04/07/2019); Tibia fracture surgery (Right, 2000); Coronary artery bypass graft (N/A, 04/07/2019); RECONSTRUCTIVE REPAIR STERNAL (N/A, 04/08/2019); Hardware Removal (Right, 04/22/2019); REMOVE HARDWARE FEMUR (N/A, 04/22/2019); Carotid stent placement (07/03/2019); Leg amputation below knee (Right, 08/25/2019); Leg amputation below knee (Right, 08/27/2019); above knee amputation (Right, 09/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 SOCIAL HISTORY:      reports that he has been smoking cigarettes. He started smoking about 2 years ago. He has a 20.00 pack-year smoking history. He has never used smokeless tobacco. He reports that he does not drink alcohol and does not use drugs.      FAMILY HISTORY:     family history includes Diabetes in his brother, father, and mother; Heart Disease in his brother, father, and mother; High Blood Pressure in his brother, father, and sister; Stroke in his father and mother. HOME MEDICATIONS:      Prior to Admission medications    Medication Sig Start Date End Date Taking? Authorizing Provider   metoprolol tartrate (LOPRESSOR) 25 MG tablet Take 2 tablets by mouth 2 times daily 8/24/22   Kareem Contreras MD   clopidogrel (PLAVIX) 75 MG tablet TAKE 1 TABLET BY MOUTH DAILY 2/17/22   Raj Landon MD   gabapentin (NEURONTIN) 300 MG capsule TAKE 1 CAPSULE BY MOUTH EVERY 8 HOURS 2/17/22 3/19/22  Raj Landon MD   metFORMIN (GLUCOPHAGE) 1000 MG tablet Take 1 tablet by mouth 2 times daily (with meals) 1/13/22 3/14/22  Raj Landon MD   insulin lispro, 1 Unit Dial, (HUMALOG KWIKPEN) 100 UNIT/ML SOPN INJECT SUBCUTANEOUSLY BEFORE MEALS AS DIRECTED PER SLIDING SCALE: SEE ATTACHED FOR SLIDING SCALE DIRECTIONS: Hold all insulin and notify Dr immediately if BG <60. -200: 2 UNITS; -250: 4UNITS; 251-300 6 UNITS; 301-350: 8 UNITS  Patient not taking: Reported on 1/13/2022 1/5/22   Raj Landon MD   lisinopril-hydroCHLOROthiazide (PRINZIDE;ZESTORETIC) 20-25 MG per tablet Take 2 tablets by mouth daily .   Take 1 pill if blood pressure is less than 130/80 12/23/21   Raj Landon MD   insulin glargine (LANTUS SOLOSTAR) 100 UNIT/ML injection pen Inject 24 Units into the skin nightly 11/9/21 1/13/22  Jordin Vincent MD   Insulin Syringe-Needle U-100 30G X 5/16\" 1 ML MISC 1 each by Does not apply route daily 11/9/21   Jordin Vincent MD   atorvastatin (LIPITOR) 40 MG tablet Take 1 tablet by mouth nightly 10/19/21   Susan Alex MD   metoprolol tartrate (LOPRESSOR) 50 MG tablet Take 1 tablet by mouth 2 times daily 10/19/21   Susan Alex MD   blood glucose test strips (ONETOUCH VERIO) strip TEST 2 TIMES A DAY & AS NEEDED FOR SYMPTOMS OF IRREGULAR BLOOD GLUCOSE. 5/28/21   Hemalatha Peter MD   Alcohol Swabs (B-D SINGLE USE SWABS REGULAR) PADS USE AS DIRECTED WITH TESTING SUPPLIES 5/28/21   Abimbola Capellan MD   Lancets ([address]) [address] USE AS DIRECTED TO TEST BLOOD SUGAR 5/28/21   Abimbola Capellan MD   sildenafil (VIAGRA) 100 MG tablet Take 1 tablet by mouth as needed for Erectile Dysfunction 1/7/21   Abimbola Capellan MD   Blood Glucose Monitoring Suppl (ONE TOUCH ULTRA 2) w/Device KIT U UTD ONCE D 9/27/19   Historical Provider, MD   acetaminophen (TYLENOL) 500 MG tablet Take 2 tablets by mouth every 8 hours as needed for Pain 9/30/19   Alba Colorado, DO   Handicap Placard [address] [facility] by Does not apply route For 5 years 9/27/19   Abimbola Capellan MD   Blood Glucose Monitoring Suppl [facility] BLOOD GLUCOSE METER) w/Device KIT 1 kit by Does not apply route once for 1 dose 9/27/19 9/27/19  Abimbola Capellan MD   Insulin Pen Needle 32G X 6 MM MISC 1 each by Does not apply route daily 9/27/19   Abimbola Capellan MD   furosemide (LASIX) 20 MG tablet Take 1 tablet by mouth daily  Patient not taking: Reported on 11/16/2021 7/4/19   ISAAC Schmitt - CNP   aspirin 81 MG chewable tablet Take 1 tablet by mouth daily 5/10/19   Drew Saleh MD   Multiple Vitamins-Minerals (THERAPEUTIC MULTIVITAMIN-MINERALS) tablet Take 1 tablet by mouth daily (with breakfast) 5/10/19   Drew Saleh MD       ALLERGIES:     Patient has no known allergies. OBJECTIVE:       Vitals:    01/19/23 0104 01/19/23 0115 01/19/23 0400 01/19/23 0811   BP: (!) 180/75 (!) 147/65 (!) 146/90 136/69   Pulse:  85  86   Resp:  17  13   Temp:   97.9 °F (36.6 °C) 98.2 °F (36.8 °C)   TempSrc:   Oral Oral   SpO2:   95% 91%   Weight:   139 lb 8.8 oz (63.3 kg)    Height:             Intake/Output Summary (Last 24 hours) at 1/19/2023 0845  Last data filed at 1/19/2023 6234  Gross per 24 hour   Intake 1779.32 ml   Output 2275 ml   Net -495.68 ml         PHYSICAL EXAM:  Physical Exam  Constitutional:       General: He is not in acute distress. Appearance: Normal appearance. He is not ill-appearing. HENT:      Head: Normocephalic and atraumatic. Eyes:      Extraocular Movements: Extraocular movements intact. Cardiovascular:      Rate and Rhythm: Normal rate and regular rhythm. Pulses: Normal pulses. Pulmonary:      Effort: Pulmonary effort is normal. No respiratory distress. Breath sounds: No wheezing, rhonchi or rales. Abdominal:      General: Bowel sounds are normal. There is no distension. Palpations: Abdomen is soft. Tenderness: There is no abdominal tenderness. There is no guarding. Genitourinary:     Comments: Tate in place  Musculoskeletal:      Comments: S/p right AKA   Skin:     General: Skin is warm.    Neurological:      Comments: Unarousable    DIAGNOSTICS:     Laboratory Testing:    Recent Results (from the past 24 hour(s))   POC Glucose Fingerstick    Collection Time: 01/18/23 11:32 AM   Result Value Ref Range    POC Glucose 177 (H) 75 - 110 mg/dL   APTT    Collection Time: 01/18/23 12:25 PM   Result Value Ref Range    PTT 54.0 (H) 20.5 - 30.5 sec   POC Glucose Fingerstick    Collection Time: 01/18/23  3:38 PM   Result Value Ref Range    POC Glucose 261 (H) 75 - 110 mg/dL   Magnesium    Collection Time: 01/18/23  6:30 PM   Result Value Ref Range    Magnesium 1.6 1.6 - 2.6 mg/dL   POC Glucose Fingerstick    Collection Time: 01/18/23  7:31 PM   Result Value Ref Range    POC Glucose 235 (H) 75 - 110 mg/dL   CBC with Auto Differential    Collection Time: 01/19/23  6:02 AM   Result Value Ref Range    WBC 12.2 (H) 3.5 - 11.3 k/uL    RBC 4.43 4.21 - 5.77 m/uL    Hemoglobin 13.1 13.0 - 17.0 g/dL    Hematocrit 41.0 40.7 - 50.3 %    MCV 92.6 82.6 - 102.9 fL    MCH 29.6 25.2 - 33.5 pg    MCHC 32.0 28.4 - 34.8 g/dL    RDW 13.6 11.8 - 14.4 %    Platelets 234 586 - 162 k/uL    MPV 10.8 8.1 - 13.5 fL    NRBC Automated 0.0 0.0 per 100 WBC    Seg Neutrophils 65 36 - 65 %    Lymphocytes 25 24 - 43 %    Monocytes 8 3 - 12 %    Eosinophils % 1 1 - 4 %    Basophils 1 0 - 2 %    Immature Granulocytes 0 0 %    Segs Absolute 7.99 1.50 - 8.10 k/uL    Absolute Lymph # 3.01 1.10 - 3.70 k/uL    Absolute Mono # 0.96 0.10 - 1.20 k/uL    Absolute Eos # 0.16 0.00 - 0.44 k/uL    Basophils Absolute 0.07 0.00 - 0.20 k/uL    Absolute Immature Granulocyte 0.03 0.00 - 0.30 k/uL   Basic Metabolic Panel    Collection Time: 01/19/23  6:02 AM   Result Value Ref Range    Glucose 113 (H) 70 - 99 mg/dL    BUN 10 6 - 20 mg/dL    Creatinine 0.71 0.70 - 1.20 mg/dL    Est, Glom Filt Rate >60 >60 mL/min/1.73m2    Calcium 8.5 (L) 8.6 - 10.4 mg/dL    Sodium 140 135 - 144 mmol/L    Potassium 3.5 (L) 3.7 - 5.3 mmol/L    Chloride 105 98 - 107 mmol/L    CO2 26 20 - 31 mmol/L    Anion Gap 9 9 - 17 mmol/L   POC Glucose Fingerstick    Collection Time: 01/19/23  7:48 AM   Result Value Ref Range    POC Glucose 107 75 - 110 mg/dL         Imaging/Diagonstics:    Current Facility-Administered Medications   Medication Dose Route Frequency Provider Last Rate Last Admin    amLODIPine (NORVASC) tablet 10 mg  10 mg Oral Daily Dragan Aguilera MD   10 mg at 01/19/23 0823    carvedilol (COREG) tablet 25 mg  25 mg Oral BID  ISAAC Palomo - CNP   25 mg at 01/19/23 0823    potassium chloride (KLOR-CON M) extended release tablet 40 mEq  40 mEq Oral PRN Dragan Madden MD   40 mEq at 01/19/23 3253    Or    potassium bicarb-citric acid (EFFER-K) effervescent tablet 40 mEq  40 mEq Oral PRN Dragan Madden MD        insulin lispro (HUMALOG) injection vial 0-8 Units  0-8 Units SubCUTAneous TID  Sukhwinder Frederick MD   4 Units at 01/18/23 1623    insulin lispro (HUMALOG) injection vial 0-4 Units  0-4 Units SubCUTAneous Nightly Sukhwinder Frederick MD        insulin glargine (LANTUS) injection vial 15 Units  15 Units SubCUTAneous Nightly Sukhwinder Frederick MD   15 Units at 01/18/23 2030    furosemide (LASIX) tablet 40 mg  40 mg Oral Daily ISAAC Walker CNP   40 mg at 01/19/23 4694    warfarin placeholder: dosing by pharmacy   Other RX Placeholder ISAAC Coronado CNP        lactated ringers infusion   IntraVENous Continuous Mendel Bushton,  mL/hr at 01/19/23 0300 New Bag at 01/19/23 0300    acetaminophen (TYLENOL) tablet 650 mg  650 mg Oral Q4H PRN Charleen Patten MD        famotidine (PEPCID) tablet 20 mg  20 mg Oral BID Hanna Stephens MD   20 mg at 01/19/23 0710    nicotine (NICODERM CQ) 14 MG/24HR 1 patch  1 patch TransDERmal Daily Kyle Rodriguez MD   1 patch at 01/15/23 0852    atorvastatin (LIPITOR) tablet 80 mg  80 mg Oral Nightly Laura Diaz MD   80 mg at 01/18/23 2000    lacosamide (VIMPAT) tablet 100 mg  100 mg Oral BID ISAAC Saleh CNP   100 mg at 01/19/23 0823    levETIRAcetam (KEPPRA) tablet 500 mg  500 mg Oral BID ISAAC Saleh - CNP   500 mg at 01/19/23 0823    lisinopril (PRINIVIL;ZESTRIL) tablet 20 mg  20 mg Oral Daily Oswaldo Javier, DO   20 mg at 01/19/23 0823    bisacodyl (DULCOLAX) EC tablet 5 mg  5 mg Oral Daily PRN Tommy Romano MD        heparin 25,000 units in dextrose 5 % 250 mL infusion (rate based)  5-30 Units/kg/hr IntraVENous Continuous Jessy Ledesma MD 14.3 mL/hr at 01/19/23 0400 21 Units/kg/hr at 01/19/23 0400    glucose chewable tablet 16 g  4 tablet Oral PRN Marquita Hood MD        dextrose bolus 10% 125 mL  125 mL IntraVENous PRN Marquita Hood MD        Or    dextrose bolus 10% 250 mL  250 mL IntraVENous PRN Marquita Hood MD        glucagon (rDNA) injection 1 mg  1 mg SubCUTAneous PRN Marquita Hood MD        dextrose 10 % infusion   IntraVENous Continuous GEOVANNA Hood MD        heparin (porcine) injection 4,000 Units  4,000 Units IntraVENous PRN Jessy Ledesma MD   4,000 Units at 01/10/23 1538    heparin (porcine) injection 2,000 Units  2,000 Units IntraVENous PRN Jessy Ledesma MD   2,000 Units at 01/17/23 1211    aspirin chewable tablet 81 mg  81 mg Oral Daily Jessy Ledesma MD   81 mg at 01/19/23 0823    therapeutic multivitamin-minerals 1 tablet  1 tablet Oral Daily with breakfast Cliff Egan MD   1 tablet at 01/19/23 0823    dextrose bolus 10% 125 mL  125 mL IntraVENous PRN Cliff Egan MD   Stopped at 01/10/23 1139    Or    dextrose bolus 10% 250 mL  250 mL IntraVENous PRN Cliff Egan MD        potassium chloride 10 mEq/100 mL IVPB (Peripheral Line)  10 mEq IntraVENous PRN Cliff Egan  mL/hr at 01/16/23 1142 10 mEq at 01/16/23 1142    magnesium sulfate 1000 mg in dextrose 5% 100 mL IVPB  1,000 mg IntraVENous PRN Cliff Egan MD   Stopped at 01/19/23 0028    sodium phosphate 10 mmol in sodium chloride 0.9 % 250 mL IVPB  10 mmol IntraVENous PRN Cliff Egan MD   Stopped at 01/10/23 1327    Or    sodium phosphate 15 mmol in dextrose 5 % 250 mL IVPB  15 mmol IntraVENous PRN Cliff Egan MD   Stopped at 01/08/23 1315    Or    sodium phosphate 20 mmol in dextrose 5 % 500 mL IVPB  20 mmol IntraVENous PRN Cliff Egan MD        sodium chloride flush 0.9 % injection 5-40 mL  5-40 mL IntraVENous 2 times per day Cliff Egan MD   10 mL at 01/17/23 0850    sodium chloride flush 0.9 % injection 5-40 mL  5-40 mL IntraVENous PRN Cliff Egan MD        0.9 % sodium chloride infusion   IntraVENous PRN Cliff Egan MD   Stopped at 01/14/23 1603    ondansetron (ZOFRAN-ODT) disintegrating tablet 4 mg  4 mg Oral Q8H PRN Cliff Egan MD        Or    ondansetron (ZOFRAN) injection 4 mg  4 mg IntraVENous Q6H PRN Cliff Egan MD        polyethylene glycol (GLYCOLAX) packet 17 g  17 g Oral Daily PRN Cliff Egan MD        acetaminophen (TYLENOL) tablet 650 mg  650 mg Oral Q6H PRN Cliff Egan MD        Or    acetaminophen (TYLENOL) suppository 650 mg  650 mg Rectal Q6H PRN Cliff Egan MD           ASSESSMENT:     Principal Problem:    Altered mental status  Active Problems:    DKA, type 2, not at goal [facility])    Stenosis of right middle cerebral artery    At risk of seizures    Cardiomyopathy, ischemic    Acute ischemic right MCA stroke ([facility] [address]    Acute metabolic encephalopathy    Cerebral multi-infarct state  Resolved Problems:    * No resolved hospital problems. 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   -Aspirin 81 mg daily and Lipitor 80 mg nightly  -Endovascular on board:              -no intervention, CTA is stable and L ICA and L VA occlusion are chronic and known before 2019              -F/u neuro endovascular clinic in 6 months  -Vascular on board:              -Patient SP subclavian artery stent, recovering well, continue anticoagulation      Left ventricle apical thrombus 1.4 cm X0 0.6 cm, LVEF 35-40%, MV-CAD s/p PEA arrest, s/p CABG 2019  -ECHO 1/9: Left ventricle apical thrombus- On Heparin Gtt              -Will need to be transition to [facility] at discharge  -Cardiology on board:              -S/p cardiac cath 1/13              -Continue heparin for LV thrombus, IV Lasix 40 mg twice daily, Coreg 12.5 mg twice daily, aspirin 81 mg daily, statin 40 mg nightly, lisinopril 20 mg daily   -Potassium 3.5 today, magnesium replaced yesterday- 1.6 today, new measurement ordered   -now patient on warfarin and being bridged with heparin until reaches therapeutic INR, cardiology will clear for DC once INR is in therapeutic range        -Cardiac cath done 1/13 showed multivessel CAD              -OM1 has high origin with ostial 90% stenosis              -LCx: Small caliber vessel. Proximal and distal 70% stenosis              -99% stenosis at proximal left subclavian artery at origin of LIMA, leading to its underfilling.       Systolic CHF  -EF 48-11%, moderate reduction in LV systolic function  -Continue IV lasix 40 mg BID, Coreg 12.5 mg BID, lisinopril 20 mg QD  -Cardiology on board-want to continue ACE inhibitor's, beta-blockers and IV diuresis with close monitoring of renal function. And continue aspirin and statin        T2DM  -Medium dose sliding scale  -Hypoglycemia protocol in place  -Glucose normalized  -Lantus to 15 units       AMS   -Neurology on board:              -No driving for 6 months              -Continue Keppra and Vimpat on discharge              -Signed off  -MRI brain showed tiny acute infarct   -LTME no seizure activty  -Continue ASA 81 mg QD, Lipitor 80 mg nightly  -Continue IV Keppra 500 mg BID and Vimpat 100 mg BID        Hypertension  -On coreg and Lisinopril  -Norvasc increased to 10mg  -Hydrazine PRN         Gi Ppx: IV Pepcid 20 mg twice daily  DVT Ppx: on heparin and warfarin       Pacheco Hayden MD  Family Medicine Resident  1/19/2023 8:45 AM            Please note that this chart was generated using voice recognition Dragon dictation software.   Although every effort was made to ensure the accuracy of this automated transcription, some errors in transcription may have occurred

## 2023-01-19 NOTE — PROGRESS NOTES
Pharmacy Note  Warfarin Consult follow-up      Recent Labs     01/19/23  0602   INR 1.3     Recent Labs     01/17/23  0306 01/18/23  0604 01/19/23  0602   HGB 13.2 13.3 13.1   HCT 40.9 44.4 41.0    342 341       Significant Drug-Drug Interactions:  New warfarin drug-drug interactions: none  Discontinued drug-drug interactions: none    Date                INR        Dose   1/17/2023            1.1        5 mg  1/18                     1.1        7.5mg  1/19                     1.3        7.5 mg      Notes:                     Daily PT/INR while inpatient.        Malvin Dupont PharmD, BCPS 1/19/2023 12:47 PM

## 2023-01-19 NOTE — PROGRESS NOTES
Occupational Therapy  Facility/Department: [facility] CAR 2- STEPDOWN  Occupational Therapy Daily Treatment Note    Name: Niharika Norton  : 1965  MRN: [id number]  Date of Service: 2023    Discharge Recommendations:  Patient would benefit from continued therapy after discharge    Patient Diagnosis(es): The primary encounter diagnosis was Altered mental status, unspecified altered mental status type. Diagnoses of Hyperglycemia and Stenosis of right middle cerebral artery were also pertinent to this visit. Past Medical History:  has a past medical history of CAD (coronary artery disease), Cardiac arrest ([facility] [address] Diabetes mellitus ([facility] [address] Hyperlipidemia, Hypertension, Osteolysis, PEA (Pulseless electrical activity) ([facility] [address] PVD (peripheral vascular disease) ([facility] [address] and Wound, open. Past Surgical History:  has a past surgical history that includes Femur fracture surgery (Right, ); Cardiac catheterization (2019); Femur Surgery; Tonsillectomy (); Coronary artery bypass graft (2019); Hardware Removal (Right, 2019); Tibia fracture surgery (Right, ); Coronary artery bypass graft (N/A, 2019); RECONSTRUCTIVE REPAIR STERNAL (N/A, 2019); Hardware Removal (Right, 2019); REMOVE HARDWARE FEMUR (N/A, 2019); Carotid stent placement (2019); Leg amputation below knee (Right, 2019); Leg amputation below knee (Right, 2019); above knee amputation (Right, 2019); AMPUTATION ABOVE KNEE (Right, 2019); and Insert / replace / remove pacemaker (2023). Assessment   Performance deficits / Impairments: Decreased functional mobility ; Decreased ADL status; Decreased ROM; Decreased strength;Decreased safe awareness;Decreased cognition;Decreased endurance;Decreased fine motor control;Decreased high-level IADLs;Decreased balance;Decreased coordination  Assessment: Pt making good progress toward goals this session.   Prognosis: Good  Activity Tolerance  Activity Tolerance: Patient Tolerated treatment well        Plan   Occupational Therapy Plan  Times Per Week: 2-4x/week  Current Treatment Recommendations: Balance training, Endurance training, Safety education & training, Cognitive reorientation, Equipment evaluation, education, & procurement, Patient/Caregiver education & training, Cognitive/Perceptual training, Self-Care / ADL, Coordination training, Wheelchair mobility training     Restrictions  Restrictions/Precautions  Restrictions/Precautions: Up as Tolerated, Fall Risk, General Precautions  Required Braces or Orthoses?: No  Position Activity Restriction  Other position/activity restrictions: up with assist; Hx R AKA, gamino catheter, Prosthesis present in closet    Subjective   General  Patient assessed for rehabilitation services?: Yes  Response to previous treatment: Patient with no complaints from previous session  Family / Caregiver Present: No  General Comment  Comments: RN ok'd patient for OT treatment this morning. Pt seated in recliner upon PAREDES arrival. Pt agreeable to OT session and pt denies pain throughout session       Objective   Safety Devices  Type of Devices: Patient at risk for falls;Nurse notified;Gait belt;Left in chair  Restraints  Restraints Initially in Place: No  Balance  Sitting: Without support (upright in recliner SBA ~5-6 minutes)  Standing: With support (RW CGA ~1-2 minutes)  Transfer Training  Transfer Training: Yes  Overall Level of Assistance: Contact-guard assistance  Interventions: Verbal cues (safe hand placement)  Sit to Stand: Contact-guard assistance  Stand to Sit: Contact-guard assistance for safety and balance walking on prosthetic  Gait  Overall Level of Assistance: Contact-guard assistance; Other (comment) (slow movements)  Interventions: Verbal cues; Tactile cues; Safety awareness training  Distance (ft):  (Room distances with RW)  Assistive Device: Walker, rolling        ADL  Feeding: Modified independent   Feeding Skilled Clinical Factors: Pt now able to manipulate utensils appropriately and complete feeding  Grooming: Verbal cueing;Supervision;Modified independent   Grooming Skilled Clinical Factors: rinse mouth with mouthwash, comb hair, wash face  LE Dressing: Stand by assistance  LE Dressing Skilled Clinical Factors: don left sock and shoe. right prosthesis  Additional Comments: Pt with increased cognition and completing tasks with increased independence and safety this session. Pt reports he bathed this morning with staff        Bed mobility  Supine to Sit: Unable to assess  Sit to Supine: Unable to assess  Scooting: Stand by assistance  Bed Mobility Comments: Pt began and concluded session seated in recliner. Pt demonstrates ability to scoot in and out of recliner        Cognition  Overall Cognitive Status: Exceptions  Following Commands: Follows one step commands consistently; Follows multistep commands with increased time; Follows multistep commands with repitition  Attention Span: Attends with cues to redirect (Pt is very talkative)  Safety Judgement: Decreased awareness of need for safety;Decreased awareness of need for assistance  Problem Solving: Assistance required to correct errors made  Insights: Decreased awareness of deficits  Initiation: Requires cues for some  Cognition Comment: Increased cognition and ability to follow directions this session  Orientation  Overall Orientation Status: Within Normal Limits            Education Given To: Patient  Education Provided: Role of Therapy; ADL Adaptive Strategies;Transfer Training; Fall Prevention Strategies  Education Provided Comments: Safety, body mechanics  Education Method: Verbal;Demonstration  Barriers to Learning: Cognition  Education Outcome: Verbalized understanding;Demonstrated understanding;Continued education needed    AM-PAC Score  AM-[facility] Inpatient Daily Activity Raw Score: 21 (01/19/23 1332)  AM-PAC Inpatient ADL T-Scale Score : 44.27 (01/19/23 1332)  ADL Inpatient CMS 0-100% Score: 32.79 (01/19/23 1332)  ADL Inpatient CMS G-Code Modifier : CJ (01/19/23 1332)      Goals  Short Term Goals  Time Frame for Short Term Goals: By discharge  Short Term Goal 1: Pt will follow 75% 1-step commands throughout session (Goal upadated 1/15 by Jackie Chadwick OTR/L in collaboration with BIGG ChadwickA)  Short Term Goal 2: Pt will engage in UB ADL's with CGA (Goal updated 1/15 by Jackie Chadwick OTR/L in collaboration with BIGG ChadwickA)  Short Term Goal 3: Pt will engage in LB ADL's with Min A with AE/DME/modified techniques (Goal updated 1/15 by Jackie Chadwick OTR/L in collaboration with BIGG ChadwickA)  Short Term Goal 4: Pt will tolerate standing 7+ mins CGA w/RW for engagement in functional task (Goal updated 1/15 by Jackie Chadwick OTR/L in collaboration with Imelda De Leon PAREDES)  Short Term Goal 5: Pt will complete functional mobility/transfers CGA w/RW (Goal added 1/15 by Jackie Chadwick OTR/L in collaboration with Imelda De Leon PAREDES)  Short Term Goal 6: NOTIFY OTR TO UPDATE GOALS AS PT PROGRESSESS       Therapy Time   Individual Concurrent Group Co-treatment   Time In 8831         Time Out 7394         Minutes 24         Timed Code Treatment Minutes: 24 Minutes       Fredy Avalos, PAREDES/L

## 2023-01-19 NOTE — PROGRESS NOTES
Endovascular Neurosurgery Progress Note    SUBJECTIVE:   Patient was awake and alert, patient stated he feels normal, no complaints. He is in good spirit today, he is happily waiting for rehab    Review of Systems:  CONSTITUTIONAL:  negative for fevers, chills, fatigue and malaise    EYES:  negative for double vision, blurred vision and photophobia     HEENT:  negative for tinnitus, epistaxis and sore throat    RESPIRATORY:  negative for cough, shortness of breath, wheezing    CARDIOVASCULAR:  negative for chest pain, palpitations, syncope, edema    GASTROINTESTINAL:  negative for nausea, vomiting    GENITOURINARY:  negative for incontinence    MUSCULOSKELETAL:  negative for neck or back pain    NEUROLOGICAL:  Negative for weakness and tingling  negative for headaches and dizziness    PSYCHIATRIC:  negative for anxiety      Review of systems otherwise negative. OBJECTIVE:     Vitals:    23 0811   BP: 136/69   Pulse: 86   Resp: 13   Temp: 98.2 °F (36.8 °C)   SpO2: 91%        General:  Gen: normal habitus, NAD  HEENT: NCAT, mucosa moist  Cvs: RRR, S1 S2 normal  Resp: symmetric unlabored breathing  Abd: s/nd/nt  Ext: no edema  Skin: no lesions seen, warm and dry    Neuro:  Gen: Disoriented to Time, Place and Person  Lang/speech: Follows commands   CN: PERRL, EOMI, VFF, V1-3 intact, mild right face droop, hearing intact  Motor: grossly 5/5 UE and LE on the left, right arm 5/5, right leg amputated above knee 5/5 for stump  Sense: decrease on the left  Coord: deferred  DTR: deferred  Gait: deferred    NIH Stroke Scale:   1a  Level of consciousness: 0   1b. LOC questions:  0 - answers both questions correctly   1c. LOC commands: 0 - performs both tasks correctly   2. Best Gaze: 0 - normal   3. Visual: 0 - no visual loss   4. Facial Palsy: 0 - normal symmetric movement   5a. Motor left arm: 0 - no drift, limb holds 90 (or 45) degrees for full 10 seconds   5b. Motor right arm: 0   6a.  Motor left le - no drift; leg holds 30 degree position for full 5 seconds   6b  Motor right le - no drift; leg holds 30 degree position for full 5 seconds (pt has R AKA)   7. Limb Ataxia: 0 - absent   8. Sensory: 0   9. Best Language:  0   10. Dysarthria: 0 - normal   11. Extinction and Inattention: 0 - no abnormality         Total:   0     MRS: 05 (baseline due to R AKA)      LABS:   Reviewed. Lab Results   Component Value Date    HGB 13.1 2023    WBC 12.2 (H) 2023     2023     2023    BUN 10 2023    CREATININE 0.71 2023    AST 16 2023    ALT 18 2023    MG 2.0 2023    APTT 54.0 (H) 2023    INR 1.1 2023      No results found for: COVID19    RADIOLOGY:   Images were personally reviewed including:   Cerebral angiogram 7/3/2019:  1. Right proximal immediately after the origin cervical ICA atherosclerotic changes resulting into a focal area of stenosis measuring approximately 65% per NASCET criteria. 2.  The above mentioned stenosis was treated with post balloon angioplasty and stenting using a 7-10x 30 mm Acculink stent with no significant residual stenosis. 3.  Right common femoral artery severe stenosis with good collaterals. CTA head neck 23: R ICA Acculink stent patent and L ICA chronic occlusion seen before 2019. Dominant R VA and chronically occluded L VA       MRI brain w/o contrast 23: punctate right temporal periventricular stroke. 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 Patient presented to the ED with AMS and found to have glucose of 620 and possible sepsis.   Endovascular consulted for chronic L ICA and L VA occlusion and hx of R ICA stent in 2019.    Neuro exam was stable    L SA origin stent on the 1/16/2023  PLAN:   - no intervention from neuroendovascular standpoint, CTA is stable and L ICA and L VA occlusion are chronic and known before 2019  - R ICA stent is patent  - patient has a punctate right temporal periventricular stroke, he is currently already on aspirin, recommend continue it  - patient is on heparin for ACS/Left ventricle thrombus, cardiology managing  - ICU management   - PT OT and SLP  - F/up with Neuro endovascular clinic in 6 months with Dr Tracey Jim       Case discussed with Dr. Tracey Jim attending.     Selam Taylor MD  Stroke, [facility]  [id number]  Electronically signed 1/19/2023 at 10:32 AM

## 2023-01-19 NOTE — CARE COORDINATION
Update given to Kathie from [facility] [place], [facility].

## 2023-01-19 NOTE — PROGRESS NOTES
Physical Therapy  Facility/Department: [facility] CAR 2- STEPDOWN  Physical Therapy daily treatment note    Name: Hung Almaguer  : 1965  MRN: [id number]  Date of Service: 2023    Discharge Recommendations:  Patient would benefit from continued therapy after discharge   PT Equipment Recommendations  Equipment Needed: No      Patient Diagnosis(es): The primary encounter diagnosis was Altered mental status, unspecified altered mental status type. Diagnoses of Hyperglycemia and Stenosis of right middle cerebral artery were also pertinent to this visit. Past Medical History:  has a past medical history of CAD (coronary artery disease), Cardiac arrest ([facility] [address] Diabetes mellitus ([facility] [address] Hyperlipidemia, Hypertension, Osteolysis, PEA (Pulseless electrical activity) ([facility] [address] PVD (peripheral vascular disease) ([facility] [address] and Wound, open. Past Surgical History:  has a past surgical history that includes Femur fracture surgery (Right, ); Cardiac catheterization (2019); Femur Surgery; Tonsillectomy (); Coronary artery bypass graft (2019); Hardware Removal (Right, 2019); Tibia fracture surgery (Right, ); Coronary artery bypass graft (N/A, 2019); RECONSTRUCTIVE REPAIR STERNAL (N/A, 2019); Hardware Removal (Right, 2019); REMOVE HARDWARE FEMUR (N/A, 2019); Carotid stent placement (2019); Leg amputation below knee (Right, 2019); Leg amputation below knee (Right, 2019); above knee amputation (Right, 2019); AMPUTATION ABOVE KNEE (Right, 2019); and Insert / replace / remove pacemaker (2023). Assessment   Body Structures, Functions, Activity Limitations Requiring Skilled Therapeutic Intervention: Decreased functional mobility ; Decreased endurance;Decreased balance;Decreased safe awareness;Decreased body mechanics  Assessment: Pt amb 50 ft wtih RW and CGA. Demo ability to don prosthetic with independence.  LImited by decreased balance, strenght, recommend contiuned PTafter d/c to address deficits  Therapy Prognosis: Good  Activity Tolerance  Activity Tolerance: Patient tolerated treatment well     Plan   Physcial Therapy Plan  General Plan: 3-5 times per week  Current Treatment Recommendations: Strengthening, Balance training, Functional mobility training, Transfer training, Endurance training, Neuromuscular re-education, Cognitive reorientation, Safety education & training, Patient/Caregiver education & training, Therapeutic activities  Safety Devices  Type of Devices: Patient at risk for falls, Nurse notified, Gait belt, Left in chair  Restraints  Restraints Initially in Place: No     Restrictions  Restrictions/Precautions  Restrictions/Precautions: Up as Tolerated, Fall Risk, General Precautions  Required Braces or Orthoses?: No  Position Activity Restriction  Other position/activity restrictions: up with assist; Hx R AKA, gamino catheter, Prosthesis present in closet     Subjective   General  Patient assessed for rehabilitation services?: Yes  Response To Previous Treatment: Patient with no complaints from previous session.   Family / Caregiver Present: No  Follows Commands: Within Functional Limits  Subjective  Subjective: Pt resting in bed upon arrival, agreeable to PT, pleasant and cooperative   Cognition   Orientation  Overall Orientation Status: Within Normal Limits     Objective   Bed mobility  Rolling to Left: Supervision  Supine to Sit: Supervision  Scooting: Supervision  Transfers  Sit to Stand: Contact guard assistance  Stand to Sit: Contact guard assistance  Comment: sit to stand x 3 trials, pt independently donned prosthetic prior to transfers  Ambulation  Surface: Level tile  Device: Rolling Walker  Assistance: Contact guard assistance  Quality of Gait: step to gait pattern advancing with R LE, which improved with distance, unsteady  Distance: 50 ft  More Ambulation?: No     Balance  Posture: Fair  Sitting - Static: Good;-  Sitting - Dynamic: Good;-  Standing - Static: Fair  Standing - Dynamic: Fair;-  Comments: assessed with RW   Exercise  Issued and reviewed seated B LE HEP, demo understanding    AM-PAC Score  AM-PAC Inpatient Mobility Raw Score : 18 (01/17/23 1551)  AM-PAC Inpatient T-Scale Score : 43.63 (01/17/23 1551)  Mobility Inpatient CMS 0-100% Score: 46.58 (01/17/23 1551)  Mobility Inpatient CMS G-Code Modifier : CK (01/17/23 1551)      Goals  Short Term Goals  Time Frame for Short Term Goals: 14 visits  Short Term Goal 1: Complete transfers with mod I and RW  Short Term Goal 2: Ambulate 200 ft of gait with mod I and RW  Short Term Goal 3: Participate in 30 minutes of therapy to promote endurance  Short Term Goal 4: Pt able to improve sitting balance at EOB to fair- to improve independence  Short Term Goal 5: Attempt sit <>stand with benito inman with 2 person assist , and tolerate standing for 1 to 2 minutes. .  Short Term Goal 6: Transfers to [facility] with transfer lift system to tolerate sitting in chair for meals. Patient Goals   Patient Goals : Not able to state.        Therapy Time   Individual Concurrent Group Co-treatment   Time In 1130         Time Out 1200         Minutes [address] [place] [address]

## 2023-01-20 LAB
ABSOLUTE EOS #: 0.23 K/UL (ref 0–0.44)
ABSOLUTE IMMATURE GRANULOCYTE: 0.04 K/UL (ref 0–0.3)
ABSOLUTE LYMPH #: 3.23 K/UL (ref 1.1–3.7)
ABSOLUTE MONO #: 0.86 K/UL (ref 0.1–1.2)
ANION GAP SERPL CALCULATED.3IONS-SCNC: 11 MMOL/L (ref 9–17)
BASOPHILS # BLD: 1 % (ref 0–2)
BASOPHILS ABSOLUTE: 0.07 K/UL (ref 0–0.2)
BUN BLDV-MCNC: 6 MG/DL (ref 6–20)
CALCIUM SERPL-MCNC: 8.8 MG/DL (ref 8.6–10.4)
CHLORIDE BLD-SCNC: 105 MMOL/L (ref 98–107)
CO2: 23 MMOL/L (ref 20–31)
CREAT SERPL-MCNC: 0.74 MG/DL (ref 0.7–1.2)
EOSINOPHILS RELATIVE PERCENT: 2 % (ref 1–4)
GFR SERPL CREATININE-BSD FRML MDRD: >60 ML/MIN/1.73M2
GLUCOSE BLD-MCNC: 123 MG/DL (ref 70–99)
GLUCOSE BLD-MCNC: 139 MG/DL (ref 75–110)
GLUCOSE BLD-MCNC: 238 MG/DL (ref 75–110)
GLUCOSE BLD-MCNC: 369 MG/DL (ref 75–110)
GLUCOSE BLD-MCNC: 93 MG/DL (ref 75–110)
HCT VFR BLD CALC: 39.8 % (ref 40.7–50.3)
HEMOGLOBIN: 12.6 G/DL (ref 13–17)
IMMATURE GRANULOCYTES: 0 %
INR BLD: 1.8
LYMPHOCYTES # BLD: 29 % (ref 24–43)
MAGNESIUM: 1.7 MG/DL (ref 1.6–2.6)
MCH RBC QN AUTO: 29.7 PG (ref 25.2–33.5)
MCHC RBC AUTO-ENTMCNC: 31.7 G/DL (ref 28.4–34.8)
MCV RBC AUTO: 93.9 FL (ref 82.6–102.9)
MONOCYTES # BLD: 8 % (ref 3–12)
NRBC AUTOMATED: 0 PER 100 WBC
PARTIAL THROMBOPLASTIN TIME: 67.4 SEC (ref 20.5–30.5)
PDW BLD-RTO: 13.6 % (ref 11.8–14.4)
PLATELET # BLD: 342 K/UL (ref 138–453)
PMV BLD AUTO: 10.9 FL (ref 8.1–13.5)
POTASSIUM SERPL-SCNC: 3.7 MMOL/L (ref 3.7–5.3)
PROTHROMBIN TIME: 18.5 SEC (ref 9.1–12.3)
RBC # BLD: 4.24 M/UL (ref 4.21–5.77)
SEG NEUTROPHILS: 60 % (ref 36–65)
SEGMENTED NEUTROPHILS ABSOLUTE COUNT: 6.9 K/UL (ref 1.5–8.1)
SODIUM BLD-SCNC: 139 MMOL/L (ref 135–144)
WBC # BLD: 11.3 K/UL (ref 3.5–11.3)

## 2023-01-20 PROCEDURE — 6370000000 HC RX 637 (ALT 250 FOR IP)

## 2023-01-20 PROCEDURE — 6370000000 HC RX 637 (ALT 250 FOR IP): Performed by: INTERNAL MEDICINE

## 2023-01-20 PROCEDURE — 2060000000 HC ICU INTERMEDIATE R&B

## 2023-01-20 PROCEDURE — 83735 ASSAY OF MAGNESIUM: CPT

## 2023-01-20 PROCEDURE — 82947 ASSAY GLUCOSE BLOOD QUANT: CPT

## 2023-01-20 PROCEDURE — 2580000003 HC RX 258

## 2023-01-20 PROCEDURE — 6370000000 HC RX 637 (ALT 250 FOR IP): Performed by: FAMILY MEDICINE

## 2023-01-20 PROCEDURE — 36415 COLL VENOUS BLD VENIPUNCTURE: CPT

## 2023-01-20 PROCEDURE — 6370000000 HC RX 637 (ALT 250 FOR IP): Performed by: NURSE PRACTITIONER

## 2023-01-20 PROCEDURE — 85025 COMPLETE CBC W/AUTO DIFF WBC: CPT

## 2023-01-20 PROCEDURE — 99233 SBSQ HOSP IP/OBS HIGH 50: CPT | Performed by: STUDENT IN AN ORGANIZED HEALTH CARE EDUCATION/TRAINING PROGRAM

## 2023-01-20 PROCEDURE — 85610 PROTHROMBIN TIME: CPT

## 2023-01-20 PROCEDURE — 6360000002 HC RX W HCPCS

## 2023-01-20 PROCEDURE — 80048 BASIC METABOLIC PNL TOTAL CA: CPT

## 2023-01-20 PROCEDURE — 6370000000 HC RX 637 (ALT 250 FOR IP): Performed by: STUDENT IN AN ORGANIZED HEALTH CARE EDUCATION/TRAINING PROGRAM

## 2023-01-20 PROCEDURE — 85730 THROMBOPLASTIN TIME PARTIAL: CPT

## 2023-01-20 RX ORDER — WARFARIN SODIUM 7.5 MG/1
7.5 TABLET ORAL ONCE
Status: COMPLETED | OUTPATIENT
Start: 2023-01-20 | End: 2023-01-20

## 2023-01-20 RX ORDER — POTASSIUM CHLORIDE 20 MEQ/1
40 TABLET, EXTENDED RELEASE ORAL ONCE
Status: DISCONTINUED | OUTPATIENT
Start: 2023-01-20 | End: 2023-01-21

## 2023-01-20 RX ADMIN — HEPARIN SODIUM 21 UNITS/KG/HR: 10000 INJECTION, SOLUTION INTRAVENOUS at 18:23

## 2023-01-20 RX ADMIN — CARVEDILOL 25 MG: 25 TABLET, FILM COATED ORAL at 17:20

## 2023-01-20 RX ADMIN — LACOSAMIDE 100 MG: 100 TABLET, FILM COATED ORAL at 07:56

## 2023-01-20 RX ADMIN — AMLODIPINE BESYLATE 10 MG: 10 TABLET ORAL at 07:56

## 2023-01-20 RX ADMIN — LACOSAMIDE 100 MG: 100 TABLET, FILM COATED ORAL at 20:54

## 2023-01-20 RX ADMIN — INSULIN GLARGINE 15 UNITS: 100 INJECTION, SOLUTION SUBCUTANEOUS at 20:53

## 2023-01-20 RX ADMIN — CARVEDILOL 25 MG: 25 TABLET, FILM COATED ORAL at 07:56

## 2023-01-20 RX ADMIN — LISINOPRIL 20 MG: 20 TABLET ORAL at 07:56

## 2023-01-20 RX ADMIN — ATORVASTATIN CALCIUM 80 MG: 80 TABLET, FILM COATED ORAL at 20:54

## 2023-01-20 RX ADMIN — LEVETIRACETAM 500 MG: 500 TABLET, FILM COATED ORAL at 07:56

## 2023-01-20 RX ADMIN — SODIUM CHLORIDE, PRESERVATIVE FREE 10 ML: 5 INJECTION INTRAVENOUS at 07:53

## 2023-01-20 RX ADMIN — WARFARIN SODIUM 7.5 MG: 7.5 TABLET ORAL at 18:19

## 2023-01-20 RX ADMIN — LEVETIRACETAM 500 MG: 500 TABLET, FILM COATED ORAL at 20:54

## 2023-01-20 RX ADMIN — FUROSEMIDE 40 MG: 40 TABLET ORAL at 07:56

## 2023-01-20 RX ADMIN — INSULIN LISPRO 8 UNITS: 100 INJECTION, SOLUTION INTRAVENOUS; SUBCUTANEOUS at 11:45

## 2023-01-20 RX ADMIN — Medication 1 TABLET: at 07:56

## 2023-01-20 RX ADMIN — SODIUM CHLORIDE, PRESERVATIVE FREE 10 ML: 5 INJECTION INTRAVENOUS at 20:54

## 2023-01-20 RX ADMIN — FAMOTIDINE 20 MG: 20 TABLET, FILM COATED ORAL at 07:56

## 2023-01-20 RX ADMIN — ASPIRIN 81 MG: 81 TABLET, CHEWABLE ORAL at 07:56

## 2023-01-20 RX ADMIN — HEPARIN SODIUM 21 UNITS/KG/HR: 10000 INJECTION, SOLUTION INTRAVENOUS at 06:36

## 2023-01-20 RX ADMIN — POTASSIUM BICARBONATE 40 MEQ: 782 TABLET, EFFERVESCENT ORAL at 15:57

## 2023-01-20 RX ADMIN — MAGNESIUM SULFATE HEPTAHYDRATE 1000 MG: 1 INJECTION, SOLUTION INTRAVENOUS at 08:05

## 2023-01-20 RX ADMIN — FAMOTIDINE 20 MG: 20 TABLET, FILM COATED ORAL at 20:54

## 2023-01-20 ASSESSMENT — PAIN SCALES - GENERAL
PAINLEVEL_OUTOF10: 0
PAINLEVEL_OUTOF10: 0

## 2023-01-20 NOTE — PROGRESS NOTES
Endovascular Neurosurgery Progress Note    SUBJECTIVE:   Patient was awake and alert, patient stated he feels normal, no complaints. He is in good spirit today, still on heparin gtt, no major event    Review of Systems:  CONSTITUTIONAL:  negative for fevers, chills, fatigue and malaise    EYES:  negative for double vision, blurred vision and photophobia     HEENT:  negative for tinnitus, epistaxis and sore throat    RESPIRATORY:  negative for cough, shortness of breath, wheezing    CARDIOVASCULAR:  negative for chest pain, palpitations, syncope, edema    GASTROINTESTINAL:  negative for nausea, vomiting    GENITOURINARY:  negative for incontinence    MUSCULOSKELETAL:  negative for neck or back pain    NEUROLOGICAL:  Negative for weakness and tingling  negative for headaches and dizziness    PSYCHIATRIC:  negative for anxiety      Review of systems otherwise negative. OBJECTIVE:     Vitals:    23 1555   BP: 139/66   Pulse: 67   Resp: 20   Temp: 97.6 °F (36.4 °C)   SpO2: 97%        General:  Gen: normal habitus, NAD  HEENT: NCAT, mucosa moist  Cvs: RRR, S1 S2 normal  Resp: symmetric unlabored breathing  Abd: s/nd/nt  Ext: no edema  Skin: no lesions seen, warm and dry    Neuro:  Gen: Disoriented to Time, Place and Person  Lang/speech: Follows commands   CN: PERRL, EOMI, VFF, V1-3 intact, mild right face droop, hearing intact  Motor: grossly 5/5 UE and LE on the left, right arm 5/5, right leg amputated above knee 5/5 for stump  Sense: decrease on the left  Coord: deferred  DTR: deferred  Gait: deferred    NIH Stroke Scale:   1a  Level of consciousness: 0   1b. LOC questions:  0 - answers both questions correctly   1c. LOC commands: 0 - performs both tasks correctly   2. Best Gaze: 0 - normal   3. Visual: 0 - no visual loss   4. Facial Palsy: 0 - normal symmetric movement   5a. Motor left arm: 0 - no drift, limb holds 90 (or 45) degrees for full 10 seconds   5b. Motor right arm: 0   6a.  Motor left le - no drift; leg holds 30 degree position for full 5 seconds   6b  Motor right le - no drift; leg holds 30 degree position for full 5 seconds (pt has R AKA)   7. Limb Ataxia: 0 - absent   8. Sensory: 0   9. Best Language:  0   10. Dysarthria: 0 - normal   11. Extinction and Inattention: 0 - no abnormality         Total:   0     MRS: 05 (baseline due to R AKA)      LABS:   Reviewed. Lab Results   Component Value Date    HGB 12.6 (L) 2023    WBC 11.3 2023     2023     2023    BUN 6 2023    CREATININE 0.74 2023    AST 16 2023    ALT 18 2023    MG 1.7 2023    APTT 67.4 (H) 2023    INR 1.8 2023      No results found for: COVID19    RADIOLOGY:   Images were personally reviewed including:   Cerebral angiogram 7/3/2019:  1. Right proximal immediately after the origin cervical ICA atherosclerotic changes resulting into a focal area of stenosis measuring approximately 65% per NASCET criteria. 2.  The above mentioned stenosis was treated with post balloon angioplasty and stenting using a 7-10x 30 mm Acculink stent with no significant residual stenosis. 3.  Right common femoral artery severe stenosis with good collaterals. CTA head neck 23: R ICA Acculink stent patent and L ICA chronic occlusion seen before 2019. Dominant R VA and chronically occluded L VA       MRI brain w/o contrast 23: punctate right temporal periventricular stroke. 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 Patient presented to the ED with AMS and found to have glucose of 620 and possible sepsis.   Endovascular consulted for chronic L ICA and L VA occlusion and hx of R ICA stent in 2019.    Neuro exam was stable    1/9/23: L apical thrombus    L SA origin stent on the 1/16/2023    Coumadin started on 1/17/23  PLAN:   - no intervention from neuroendovascular standpoint, CTA is stable and L ICA and L VA occlusion are chronic and known before 2019  - R ICA stent is patent  - patient has a punctate right temporal periventricular stroke, he is currently already on aspirin, recommend continue it  - patient is on heparin for ACS/Left ventricle thrombus, cardiology managing  - ICU management   - PT OT and SLP  - F/up with Neuro endovascular clinic in 6 months with Dr Mark Groves       Case discussed with Dr. Mark Groves attending.     Macho Levine MD  Stroke, [facility]  [facility]  Electronically signed 1/20/2023 at 4:58 PM

## 2023-01-20 NOTE — PROGRESS NOTES
[facility]  Progress Note    Date:   1/20/2023  Patient name:  Evelin Savage  Date of admission:  1/7/2023  4:22 PM  MRN:   [id number]  YOB: 1965    SUBJECTIVE/Last 24 hours update:     Patient seen and examined at bedside. No acute events reported overnight. Currently waiting till patient's INR is at goal before he is cleared from discharge. Patient has been accepted for home care. Patient's INR today was 1.8. Patient denies chest pain, shortness of breath, abdominal pain, nausea and vomiting. Notes from nursing staff and Consults had been reviewed, and the overnight progress had been checked with the nursing staff as well. HPI:     Patient is a 62 yo male admitted on 1/7/23 for AMS. Last well known was 1/5/23. Patient was found to be disheveled and having poor intake. In the ER patient was found to be hypertensive and elevated blood glucose levels (concern for DKA)  EKG showed STEMI, cardiology recommended starting the patient on Heparin gtt  CTA head showed severe stenosis in the left subclavian artery 2/2 noncalcified atheroscleotic plaque at the level of left vertebral artery. Patient was transferred to ICU for Hypertensive emergency and DKA management     In the ICU patient was assessed by cardiology and neurology and underwent cardiac cath which showed significant multivessel CAD, and patent grafts. Cardiology recommended continued medical therapy. Patient received Nitro drip for HTN emergency and was weaned off  Endovascular surgery was consulted for stenosis of subclavian artery but no intervention was recommended by them. Neurology was consulted for AMS did MRI brain which showed tiny acute infarcts in R temporal region per neurology not the cause of encephalopathy and so LTME was done which did not show electrographic seizures.       REVIEW OF SYSTEMS:      CONSTITUTIONAL:  no fevers, no headcahes  EYES: negative for blury vision  HEENT: No headaches, No nasal congestion, no difficulty swallowing  RESPIRATORY:negative for dyspnea, no wheezing, no Cough  CARDIOVASCULAR: negative for chest pain, no palpitations  GASTROINTESTINAL: no nausea, no vomiting, no change in bowel habits, no abdominal pain   GENITOURINARY: negative for dysuria, no hematuria   MUSCULOSKELETAL: no joint pains, no muscle aches, no swelling of joints or extremities  NEUROLOGICAL: No  Weakness or numbness      PAST MEDICAL HISTORY:      has a past medical history of CAD (coronary artery disease), Cardiac arrest ([address] Diabetes mellitus ([address] Hyperlipidemia, Hypertension, Osteolysis, PEA (Pulseless electrical activity) ([address] PVD (peripheral vascular disease) ([address] and Wound, open. PAST SURGICAL HISTORY:      has a past surgical history that includes Femur fracture surgery (Right, 2000); Cardiac catheterization (04/07/2019); Femur Surgery; Tonsillectomy (1974); Coronary artery bypass graft (04/07/2019); Hardware Removal (Right, 04/07/2019); Tibia fracture surgery (Right, 2000); Coronary artery bypass graft (N/A, 04/07/2019); RECONSTRUCTIVE REPAIR STERNAL (N/A, 04/08/2019); Hardware Removal (Right, 04/22/2019); REMOVE HARDWARE FEMUR (N/A, 04/22/2019); Carotid stent placement (07/03/2019); Leg amputation below knee (Right, 08/25/2019); Leg amputation below knee (Right, 08/27/2019); above knee amputation (Right, 09/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 SOCIAL HISTORY:      reports that he has been smoking cigarettes. He started smoking about 2 years ago. He has a 20.00 pack-year smoking history. He has never used smokeless tobacco. He reports that he does not drink alcohol and does not use drugs.      FAMILY HISTORY:     family history includes Diabetes in his brother, father, and mother; Heart Disease in his brother, father, and mother; High Blood Pressure in his brother, father, and sister; Stroke in his father and mother. HOME MEDICATIONS:      Prior to Admission medications    Medication Sig Start Date End Date Taking? Authorizing Provider   metoprolol tartrate (LOPRESSOR) 25 MG tablet Take 2 tablets by mouth 2 times daily 8/24/22   Deon Prader, MD   clopidogrel (PLAVIX) 75 MG tablet TAKE 1 TABLET BY MOUTH DAILY 2/17/22   Naty Ibanez MD   gabapentin (NEURONTIN) 300 MG capsule TAKE 1 CAPSULE BY MOUTH EVERY 8 HOURS 2/17/22 3/19/22  Naty Ibanez MD   metFORMIN (GLUCOPHAGE) 1000 MG tablet Take 1 tablet by mouth 2 times daily (with meals) 1/13/22 3/14/22  Naty Ibanez MD   insulin lispro, 1 Unit Dial, (HUMALOG KWIKPEN) 100 UNIT/ML SOPN INJECT SUBCUTANEOUSLY BEFORE MEALS AS DIRECTED PER SLIDING SCALE: SEE ATTACHED FOR SLIDING SCALE DIRECTIONS: Hold all insulin and notify Dr immediately if BG <60. -200: 2 UNITS; -250: 4UNITS; 251-300 6 UNITS; 301-350: 8 UNITS  Patient not taking: Reported on 1/13/2022 1/5/22   Naty Ibanez MD   lisinopril-hydroCHLOROthiazide (PRINZIDE;ZESTORETIC) 20-25 MG per tablet Take 2 tablets by mouth daily .   Take 1 pill if blood pressure is less than 130/80 12/23/21   Naty Ibanez MD   insulin glargine (LANTUS SOLOSTAR) 100 UNIT/ML injection pen Inject 24 Units into the skin nightly 11/9/21 1/13/22  Paulina Florez MD   Insulin Syringe-Needle U-100 30G X 5/16\" 1 ML MISC 1 each by Does not apply route daily 11/9/21   Paulina Florez MD   atorvastatin (LIPITOR) 40 MG tablet Take 1 tablet by mouth nightly 10/19/21   Shannon Schofield MD   metoprolol tartrate (LOPRESSOR) 50 MG tablet Take 1 tablet by mouth 2 times daily 10/19/21   Shannon Schofield MD   blood glucose test strips (ONETOUCH VERIO) strip TEST 2 TIMES A DAY & AS NEEDED FOR SYMPTOMS OF IRREGULAR BLOOD GLUCOSE. 5/28/21   Ally Cuba MD   Alcohol Swabs (B-D SINGLE USE SWABS REGULAR) PADS USE AS DIRECTED WITH TESTING SUPPLIES 5/28/21   Ally Cuba MD   Lancets (ONETOUCH DELICA PLUS [id number]) MISC USE AS DIRECTED TO TEST BLOOD SUGAR 5/28/21   Sarkis So MD   sildenafil (VIAGRA) 100 MG tablet Take 1 tablet by mouth as needed for Erectile Dysfunction 1/7/21   Sarkis So MD   Blood Glucose Monitoring Suppl (ONE TOUCH ULTRA 2) w/Device KIT U UTD ONCE D 9/27/19   Historical Provider, MD   acetaminophen (TYLENOL) 500 MG tablet Take 2 tablets by mouth every 8 hours as needed for Pain 9/30/19   Pari Beatty, DO   Handicap Placard MISC by Does not apply route For 5 years 9/27/19   Sarkis So MD   Blood Glucose Monitoring Suppl [facility] BLOOD GLUCOSE METER) w/Device KIT 1 kit by Does not apply route once for 1 dose 9/27/19 9/27/19  Sarkis So MD   Insulin Pen Needle 32G X 6 MM MISC 1 each by Does not apply route daily 9/27/19   Sarkis So MD   furosemide (LASIX) 20 MG tablet Take 1 tablet by mouth daily  Patient not taking: Reported on 11/16/2021 7/4/19   ISAAC Hazel - CNP   aspirin 81 MG chewable tablet Take 1 tablet by mouth daily 5/10/19   Adriel Ortez MD   Multiple Vitamins-Minerals (THERAPEUTIC MULTIVITAMIN-MINERALS) tablet Take 1 tablet by mouth daily (with breakfast) 5/10/19   Adriel Ortez MD       ALLERGIES:     Patient has no known allergies. OBJECTIVE:       Vitals:    01/20/23 0000 01/20/23 0400 01/20/23 0438 01/20/23 0724   BP:   (!) 155/72 (!) 156/73   Pulse: 78 73 76 76   Resp: 19 19 17 20   Temp:   98.1 °F (36.7 °C) 98.2 °F (36.8 °C)   TempSrc:   Oral Oral   SpO2:   95% 95%   Weight:       Height:             Intake/Output Summary (Last 24 hours) at 1/20/2023 0817  Last data filed at 1/20/2023 0816  Gross per 24 hour   Intake 660 ml   Output 1900 ml   Net -1240 ml       PHYSICAL EXAM:  General Appearance  Alert , awake , not in acute distress  HEENT - Head is normocephalic, atraumatic. Lungs - Bilateral equal air entry , no wheezes, rales or rhonchi, aeration good  Cardiovascular - Heart sounds are normal.  Regular rhythm, normal rate without murmur, gallop or rub.   Abdomen - Soft, nontender, nondistended, no masses or organomegaly  Neurologic - There are no new focal motor or sensory deficits  Skin - No bruising or bleeding on exposed skin area  Extremities - No cyanosis, clubbing or edema      DIAGNOSTICS:     Laboratory Testing:    Recent Results (from the past 24 hour(s))   POC Glucose Fingerstick    Collection Time: 01/19/23 11:51 AM   Result Value Ref Range    POC Glucose 339 (H) 75 - 110 mg/dL   POC Glucose Fingerstick    Collection Time: 01/19/23  3:50 PM   Result Value Ref Range    POC Glucose 83 75 - 110 mg/dL   POC Glucose Fingerstick    Collection Time: 01/19/23  7:30 PM   Result Value Ref Range    POC Glucose 272 (H) 75 - 110 mg/dL   POC Glucose Fingerstick    Collection Time: 01/20/23  6:33 AM   Result Value Ref Range    POC Glucose 139 (H) 75 - 110 mg/dL   CBC with Auto Differential    Collection Time: 01/20/23  6:56 AM   Result Value Ref Range    WBC 11.3 3.5 - 11.3 k/uL    RBC 4.24 4.21 - 5.77 m/uL    Hemoglobin 12.6 (L) 13.0 - 17.0 g/dL    Hematocrit 39.8 (L) 40.7 - 50.3 %    MCV 93.9 82.6 - 102.9 fL    MCH 29.7 25.2 - 33.5 pg    MCHC 31.7 28.4 - 34.8 g/dL    RDW 13.6 11.8 - 14.4 %    Platelets 067 911 - 771 k/uL    MPV 10.9 8.1 - 13.5 fL    NRBC Automated 0.0 0.0 per 100 WBC    Seg Neutrophils 60 36 - 65 %    Lymphocytes 29 24 - 43 %    Monocytes 8 3 - 12 %    Eosinophils % 2 1 - 4 %    Basophils 1 0 - 2 %    Immature Granulocytes 0 0 %    Segs Absolute 6.90 1.50 - 8.10 k/uL    Absolute Lymph # 3.23 1.10 - 3.70 k/uL    Absolute Mono # 0.86 0.10 - 1.20 k/uL    Absolute Eos # 0.23 0.00 - 0.44 k/uL    Basophils Absolute 0.07 0.00 - 0.20 k/uL    Absolute Immature Granulocyte 0.04 0.00 - 0.30 k/uL   Protime-INR    Collection Time: 01/20/23  6:56 AM   Result Value Ref Range    Protime 18.5 (H) 9.1 - 12.3 sec    INR 1.8    Magnesium    Collection Time: 01/20/23  6:56 AM   Result Value Ref Range    Magnesium 1.7 1.6 - 2.6 mg/dL   APTT    Collection Time: 01/20/23  6:56 AM   Result Value Ref Range    PTT 67.4 (H) 20.5 - 30.5 sec         Imaging/Diagonstics:  EKG: Estela Red     CXR:     Current Facility-Administered Medications   Medication Dose Route Frequency Provider Last Rate Last Admin    amLODIPine (NORVASC) tablet 10 mg  10 mg Oral Daily Dragan Aguilera MD   10 mg at 01/20/23 0756    carvedilol (COREG) tablet 25 mg  25 mg Oral BID  ISAAC Palomo CNP   25 mg at 01/20/23 0756    potassium chloride (KLOR-CON M) extended release tablet 40 mEq  40 mEq Oral PRN Dragan Madden MD   40 mEq at 01/19/23 1515    Or    potassium bicarb-citric acid (EFFER-K) effervescent tablet 40 mEq  40 mEq Oral PRN Dragan Madden MD        insulin lispro (HUMALOG) injection vial 0-8 Units  0-8 Units SubCUTAneous TID  Sukhwinder Frederick MD   6 Units at 01/19/23 1218    insulin lispro (HUMALOG) injection vial 0-4 Units  0-4 Units SubCUTAneous Nightly Sukhwinder Frederick MD        insulin glargine (LANTUS) injection vial 15 Units  15 Units SubCUTAneous Nightly Sukhwinder Frederick MD   15 Units at 01/19/23 2027    furosemide (LASIX) tablet 40 mg  40 mg Oral Daily ISAAC Walker CNP   40 mg at 01/20/23 0756    warfarin placeholder: dosing by pharmacy   Other RX Placeholder ISAAC Walker CNP        lactated ringers infusion   IntraVENous Continuous Purdys Simper,  mL/hr at 01/19/23 1319 New Bag at 01/19/23 1319    acetaminophen (TYLENOL) tablet 650 mg  650 mg Oral Q4H PRN Clyde Saleh MD        famotidine (PEPCID) tablet 20 mg  20 mg Oral BID Kalin Philip MD   20 mg at 01/20/23 0756    nicotine (NICODERM CQ) 14 MG/24HR 1 patch  1 patch TransDERmal Daily Sukhwinder Frederick MD   1 patch at 01/15/23 0862    atorvastatin (LIPITOR) tablet 80 mg  80 mg Oral Nightly Lakshmi Arce MD   80 mg at 01/19/23 2028    lacosamide (VIMPAT) tablet 100 mg  100 mg Oral BID ISAAC Saleh CNP   100 mg at 01/20/23 0756    levETIRAcetam (KEPPRA) tablet 500 mg  500 mg Oral BID Henrique ISAAC Young - CNP   500 mg at 01/20/23 0756    lisinopril (PRINIVIL;ZESTRIL) tablet 20 mg  20 mg Oral Daily Oswaldo Javier, DO   20 mg at 01/20/23 0756    bisacodyl (DULCOLAX) EC tablet 5 mg  5 mg Oral Daily PRN Olesya Marinelli MD        heparin 25,000 units in dextrose 5 % 250 mL infusion (rate based)  5-30 Units/kg/hr IntraVENous Continuous Ross Velásquez MD 14.3 mL/hr at 01/20/23 0636 21 Units/kg/hr at 01/20/23 0636    glucose chewable tablet 16 g  4 tablet Oral PRN Ja Culp MD        dextrose bolus 10% 125 mL  125 mL IntraVENous PRN Ja Culp MD        Or    dextrose bolus 10% 250 mL  250 mL IntraVENous PRN Ja Culp MD        glucagon (rDNA) injection 1 mg  1 mg SubCUTAneous PRN Ja Culp MD        dextrose 10 % infusion   IntraVENous Continuous PRN Ja Culp MD        heparin (porcine) injection 4,000 Units  4,000 Units IntraVENous PRN Ross Velásquez MD   4,000 Units at 01/10/23 1538    heparin (porcine) injection 2,000 Units  2,000 Units IntraVENous PRN Ross Velásquez MD   2,000 Units at 01/17/23 1211    aspirin chewable tablet 81 mg  81 mg Oral Daily Ross Velásquez MD   81 mg at 01/20/23 0756    therapeutic multivitamin-minerals 1 tablet  1 tablet Oral Daily with breakfast Ross Velásquez MD   1 tablet at 01/20/23 0756    dextrose bolus 10% 125 mL  125 mL IntraVENous PRN Ross Velásquez MD   Stopped at 01/10/23 1139    Or    dextrose bolus 10% 250 mL  250 mL IntraVENous PRN Ross Velásquez MD        potassium chloride 10 mEq/100 mL IVPB (Peripheral Line)  10 mEq IntraVENous PRN Ross Velásquez  mL/hr at 01/16/23 1142 10 mEq at 01/16/23 1142    magnesium sulfate 1000 mg in dextrose 5% 100 mL IVPB  1,000 mg IntraVENous PRN Ross Velásquez  mL/hr at 01/20/23 0805 1,000 mg at 01/20/23 0805    sodium phosphate 10 mmol in sodium chloride 0.9 % 250 mL IVPB  10 mmol IntraVENous PRN Ross Velásquez MD   Stopped at 01/10/23 1327    Or    sodium phosphate 15 mmol in dextrose 5 % 250 mL IVPB  15 mmol IntraVENous PRN Letty Brewster MD   Stopped at 01/08/23 1315    Or    sodium phosphate 20 mmol in dextrose 5 % 500 mL IVPB  20 mmol IntraVENous PRN Letty Brewster MD        sodium chloride flush 0.9 % injection 5-40 mL  5-40 mL IntraVENous 2 times per day Letty Brewster MD   10 mL at 01/20/23 0753    sodium chloride flush 0.9 % injection 5-40 mL  5-40 mL IntraVENous PRN Letty Brewster MD        0.9 % sodium chloride infusion   IntraVENous PRN Letty Brewster MD   Stopped at 01/14/23 1603    ondansetron (ZOFRAN-ODT) disintegrating tablet 4 mg  4 mg Oral Q8H PRN Letty Brewster MD        Or    ondansetron (ZOFRAN) injection 4 mg  4 mg IntraVENous Q6H PRN Letty Brewster MD        polyethylene glycol (GLYCOLAX) packet 17 g  17 g Oral Daily PRN Letty Brewster MD        acetaminophen (TYLENOL) tablet 650 mg  650 mg Oral Q6H PRN Letty Brewster MD        Or    acetaminophen (TYLENOL) suppository 650 mg  650 mg Rectal Q6H PRN Letty Brewster MD           ASSESSMENT:     Principal Problem:    Altered mental status  Active Problems:    DKA, type 2, not at goal [facility])    Stenosis of right middle cerebral artery    At risk of seizures    Cardiomyopathy, ischemic    Acute ischemic right MCA stroke ([facility] [address]    Acute metabolic encephalopathy    Cerebral multi-infarct state  Resolved Problems:    * No resolved hospital problems. *      PLAN:     Discussed care plan with nurse after getting her input.     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   -Aspirin 81 mg daily and Lipitor 80 mg nightly  -Endovascular on board:              -no intervention, CTA is stable and L ICA and L VA occlusion are chronic and known before 2019              -F/u neuro endovascular clinic in 6 months  -Vascular signed off              -Patient status post subclavian artery stent, recovering well, continue anticoagulation        Left ventricle apical thrombus 1.4 cm X0 0.6 cm, LVEF 35-40%, MV-CAD s/p PEA arrest, s/p CABG 2019  -ECHO 1/9: Left ventricle apical thrombus- On Heparin Gtt              -Will need to be transition to [facility] at discharge  -Cardiology on board:              -S/p cardiac cath 1/13              -Continue heparin for LV thrombus, IV Lasix 40 mg twice daily, Coreg 12.5 mg twice daily, aspirin 81 mg daily, statin 40 mg nightly, lisinopril 20 mg daily  -Potassium and magnesium replacement protocols in place                          -now patient on warfarin and being bridged with heparin until reaches therapeutic INR, cardiology will clear for DC once INR is in therapeutic range, INR today is at 1.8           -Cardiac cath done 1/13 showed multivessel CAD              -OM1 has high origin with ostial 90% stenosis              -LCx: Small caliber vessel. Proximal and distal 70% stenosis              -99% stenosis at proximal left subclavian artery at origin of LIMA, leading to its underfilling. Systolic CHF  -EF 62-23%, moderate reduction in LV systolic function  -Continue IV lasix 40 mg BID, Coreg 12.5 mg BID, lisinopril 20 mg QD  -Cardiology on board-want to continue ACE inhibitor's, beta-blockers and IV diuresis with close monitoring of renal function.   And continue aspirin and statin        T2DM  -Medium dose sliding scale  -Hypoglycemia protocol in place  -Glucose normalized  -Lantus to 15 units        AMS   -Neurology on board:              -No driving for 6 months              -Continue Keppra and Vimpat on discharge              -Signed off  -MRI brain showed tiny acute infarct   -LTME no seizure activty  -Continue ASA 81 mg QD, Lipitor 80 mg nightly  -Continue IV Keppra 500 mg BID and Vimpat 100 mg BID        Hypertension  -On coreg and Lisinopril  -Norvasc increased to 10mg  -Hydrazine PRN         Gi Ppx: IV Pepcid 20 mg twice daily  DVT Ppx: on heparin and warfarin        Plan will be discussed with the attending, Dr. Bobbi Mclean MD  Family Medicine Resident  1/20/2023 8:17 AM            Please note that this chart was generated using voice recognition Dragon dictation software.   Although every effort was made to ensure the accuracy of this automated transcription, some errors in transcription may have occurred

## 2023-01-20 NOTE — PROGRESS NOTES
Pharmacy Note  Warfarin Consult follow-up      Recent Labs     01/20/23  0656   INR 1.8     Recent Labs     01/18/23  0604 01/19/23  0602 01/20/23  0656   HGB 13.3 13.1 12.6*   HCT 44.4 41.0 39.8*    341 342       Significant Drug-Drug Interactions:  New warfarin drug-drug interactions: none  Discontinued drug-drug interactions: none    Date                INR        Dose   1/17/2023            1.1        5 mg  1/18                     1.1        7.5mg  1/19                     1.3        7.5 mg  1/20                     1.8       7.5mg      Notes:                     Daily PT/INR while inpatient.      Jose Carlos Brewster PharmD, BCPS 1/20/2023 10:39 AM

## 2023-01-20 NOTE — PLAN OF CARE
Problem: Discharge Planning  Goal: Discharge to home or other facility with appropriate resources  Outcome: Progressing     Problem: Chronic Conditions and Co-morbidities  Goal: Patient's chronic conditions and co-morbidity symptoms are monitored and maintained or improved  Outcome: Progressing     Problem: Confusion  Goal: Confusion, delirium, dementia, or psychosis is improved or at baseline  Description: INTERVENTIONS:  1. Assess for possible contributors to thought disturbance, including medications, impaired vision or hearing, underlying metabolic abnormalities, dehydration, psychiatric diagnoses, and notify attending LIP  2. [place] high risk fall precautions, as indicated  3. Provide frequent short contacts to provide reality reorientation, refocusing and direction  4. Decrease environmental stimuli, including noise as appropriate  5. Monitor and intervene to maintain adequate nutrition, hydration, elimination, sleep and activity  6. If unable to ensure safety without constant attention obtain sitter and review sitter guidelines with assigned personnel  7. Initiate Psychosocial CNS and Spiritual Care consult, as indicated  Outcome: Progressing     Problem: Skin/Tissue Integrity  Goal: Absence of new skin breakdown  Description: 1. Monitor for areas of redness and/or skin breakdown  2. Assess vascular access sites hourly  3. Every 4-6 hours minimum:  Change oxygen saturation probe site  4. Every 4-6 hours:  If on nasal continuous positive airway pressure, respiratory therapy assess nares and determine need for appliance change or resting period.   Outcome: Progressing     Problem: Safety - Adult  Goal: Free from fall injury  Outcome: Progressing     Problem: ABCDS Injury Assessment  Goal: Absence of physical injury  Outcome: Progressing     Problem: Pain  Goal: Verbalizes/displays adequate comfort level or baseline comfort level  Outcome: Progressing     Problem: Respiratory - Adult  Goal: Achieves optimal ventilation and oxygenation  Outcome: Progressing

## 2023-01-21 PROBLEM — Z95.828 PRESENCE OF INTERNAL CAROTID STENT: Status: ACTIVE | Noted: 2023-01-21

## 2023-01-21 LAB
ABSOLUTE EOS #: 0.25 K/UL (ref 0–0.44)
ABSOLUTE IMMATURE GRANULOCYTE: 0.03 K/UL (ref 0–0.3)
ABSOLUTE LYMPH #: 3.46 K/UL (ref 1.1–3.7)
ABSOLUTE MONO #: 0.87 K/UL (ref 0.1–1.2)
ANION GAP SERPL CALCULATED.3IONS-SCNC: 9 MMOL/L (ref 9–17)
BASOPHILS # BLD: 1 % (ref 0–2)
BASOPHILS ABSOLUTE: 0.1 K/UL (ref 0–0.2)
BUN BLDV-MCNC: 5 MG/DL (ref 6–20)
CALCIUM SERPL-MCNC: 9 MG/DL (ref 8.6–10.4)
CHLORIDE BLD-SCNC: 100 MMOL/L (ref 98–107)
CO2: 26 MMOL/L (ref 20–31)
CREAT SERPL-MCNC: 0.79 MG/DL (ref 0.7–1.2)
EOSINOPHILS RELATIVE PERCENT: 2 % (ref 1–4)
GFR SERPL CREATININE-BSD FRML MDRD: >60 ML/MIN/1.73M2
GLUCOSE BLD-MCNC: 128 MG/DL (ref 70–99)
GLUCOSE BLD-MCNC: 254 MG/DL (ref 75–110)
GLUCOSE BLD-MCNC: 409 MG/DL (ref 75–110)
GLUCOSE BLD-MCNC: 77 MG/DL (ref 75–110)
GLUCOSE BLD-MCNC: 81 MG/DL (ref 75–110)
HCT VFR BLD CALC: 40.5 % (ref 40.7–50.3)
HEMOGLOBIN: 13 G/DL (ref 13–17)
IMMATURE GRANULOCYTES: 0 %
INR BLD: 1.9
LYMPHOCYTES # BLD: 30 % (ref 24–43)
MAGNESIUM: 1.8 MG/DL (ref 1.6–2.6)
MCH RBC QN AUTO: 29.5 PG (ref 25.2–33.5)
MCHC RBC AUTO-ENTMCNC: 32.1 G/DL (ref 28.4–34.8)
MCV RBC AUTO: 92 FL (ref 82.6–102.9)
MONOCYTES # BLD: 8 % (ref 3–12)
NRBC AUTOMATED: 0 PER 100 WBC
PARTIAL THROMBOPLASTIN TIME: 50.3 SEC (ref 20.5–30.5)
PDW BLD-RTO: 13.8 % (ref 11.8–14.4)
PLATELET # BLD: 373 K/UL (ref 138–453)
PMV BLD AUTO: 10.6 FL (ref 8.1–13.5)
POTASSIUM SERPL-SCNC: 3.6 MMOL/L (ref 3.7–5.3)
PROTHROMBIN TIME: 19.6 SEC (ref 9.1–12.3)
RBC # BLD: 4.4 M/UL (ref 4.21–5.77)
SEG NEUTROPHILS: 59 % (ref 36–65)
SEGMENTED NEUTROPHILS ABSOLUTE COUNT: 6.67 K/UL (ref 1.5–8.1)
SODIUM BLD-SCNC: 135 MMOL/L (ref 135–144)
WBC # BLD: 11.4 K/UL (ref 3.5–11.3)

## 2023-01-21 PROCEDURE — 2060000000 HC ICU INTERMEDIATE R&B

## 2023-01-21 PROCEDURE — 83735 ASSAY OF MAGNESIUM: CPT

## 2023-01-21 PROCEDURE — 99233 SBSQ HOSP IP/OBS HIGH 50: CPT | Performed by: PSYCHIATRY & NEUROLOGY

## 2023-01-21 PROCEDURE — 85610 PROTHROMBIN TIME: CPT

## 2023-01-21 PROCEDURE — 6370000000 HC RX 637 (ALT 250 FOR IP): Performed by: STUDENT IN AN ORGANIZED HEALTH CARE EDUCATION/TRAINING PROGRAM

## 2023-01-21 PROCEDURE — 6370000000 HC RX 637 (ALT 250 FOR IP)

## 2023-01-21 PROCEDURE — 6370000000 HC RX 637 (ALT 250 FOR IP): Performed by: NURSE PRACTITIONER

## 2023-01-21 PROCEDURE — 2580000003 HC RX 258

## 2023-01-21 PROCEDURE — 6370000000 HC RX 637 (ALT 250 FOR IP): Performed by: INTERNAL MEDICINE

## 2023-01-21 PROCEDURE — 36415 COLL VENOUS BLD VENIPUNCTURE: CPT

## 2023-01-21 PROCEDURE — 80048 BASIC METABOLIC PNL TOTAL CA: CPT

## 2023-01-21 PROCEDURE — 85025 COMPLETE CBC W/AUTO DIFF WBC: CPT

## 2023-01-21 PROCEDURE — 82947 ASSAY GLUCOSE BLOOD QUANT: CPT

## 2023-01-21 PROCEDURE — 6360000002 HC RX W HCPCS

## 2023-01-21 PROCEDURE — 99233 SBSQ HOSP IP/OBS HIGH 50: CPT | Performed by: STUDENT IN AN ORGANIZED HEALTH CARE EDUCATION/TRAINING PROGRAM

## 2023-01-21 PROCEDURE — 85730 THROMBOPLASTIN TIME PARTIAL: CPT

## 2023-01-21 RX ORDER — POTASSIUM CHLORIDE 20 MEQ/1
40 TABLET, EXTENDED RELEASE ORAL ONCE
Status: COMPLETED | OUTPATIENT
Start: 2023-01-21 | End: 2023-01-21

## 2023-01-21 RX ORDER — WARFARIN SODIUM 7.5 MG/1
7.5 TABLET ORAL
Status: COMPLETED | OUTPATIENT
Start: 2023-01-21 | End: 2023-01-21

## 2023-01-21 RX ADMIN — CARVEDILOL 25 MG: 25 TABLET, FILM COATED ORAL at 17:03

## 2023-01-21 RX ADMIN — AMLODIPINE BESYLATE 10 MG: 10 TABLET ORAL at 08:21

## 2023-01-21 RX ADMIN — LACOSAMIDE 100 MG: 100 TABLET, FILM COATED ORAL at 08:20

## 2023-01-21 RX ADMIN — ATORVASTATIN CALCIUM 80 MG: 80 TABLET, FILM COATED ORAL at 21:56

## 2023-01-21 RX ADMIN — HEPARIN SODIUM 21 UNITS/KG/HR: 10000 INJECTION, SOLUTION INTRAVENOUS at 12:52

## 2023-01-21 RX ADMIN — SODIUM CHLORIDE, PRESERVATIVE FREE 10 ML: 5 INJECTION INTRAVENOUS at 08:20

## 2023-01-21 RX ADMIN — POTASSIUM CHLORIDE 40 MEQ: 1500 TABLET, EXTENDED RELEASE ORAL at 08:21

## 2023-01-21 RX ADMIN — INSULIN GLARGINE 15 UNITS: 100 INJECTION, SOLUTION SUBCUTANEOUS at 22:22

## 2023-01-21 RX ADMIN — ASPIRIN 81 MG: 81 TABLET, CHEWABLE ORAL at 08:21

## 2023-01-21 RX ADMIN — LEVETIRACETAM 500 MG: 500 TABLET, FILM COATED ORAL at 21:56

## 2023-01-21 RX ADMIN — FUROSEMIDE 40 MG: 40 TABLET ORAL at 08:21

## 2023-01-21 RX ADMIN — FAMOTIDINE 20 MG: 20 TABLET, FILM COATED ORAL at 21:56

## 2023-01-21 RX ADMIN — LISINOPRIL 20 MG: 20 TABLET ORAL at 08:20

## 2023-01-21 RX ADMIN — CARVEDILOL 25 MG: 25 TABLET, FILM COATED ORAL at 08:21

## 2023-01-21 RX ADMIN — WARFARIN SODIUM 7.5 MG: 7.5 TABLET ORAL at 17:55

## 2023-01-21 RX ADMIN — INSULIN LISPRO 8 UNITS: 100 INJECTION, SOLUTION INTRAVENOUS; SUBCUTANEOUS at 11:52

## 2023-01-21 RX ADMIN — LACOSAMIDE 100 MG: 100 TABLET, FILM COATED ORAL at 21:56

## 2023-01-21 RX ADMIN — FAMOTIDINE 20 MG: 20 TABLET, FILM COATED ORAL at 08:21

## 2023-01-21 RX ADMIN — LEVETIRACETAM 500 MG: 500 TABLET, FILM COATED ORAL at 08:20

## 2023-01-21 RX ADMIN — Medication 1 TABLET: at 08:20

## 2023-01-21 NOTE — PROGRESS NOTES
Endovascular Neurosurgery Progress Note    SUBJECTIVE:   Patient was awake and alert, patient stated he feels normal, no complaints. Eating breakfast, still on heparin gtt    He is in good spirit today, still on heparin gtt, no major event    Review of Systems:  CONSTITUTIONAL:  negative for fevers, chills, fatigue and malaise    EYES:  negative for double vision, blurred vision and photophobia     HEENT:  negative for tinnitus, epistaxis and sore throat    RESPIRATORY:  negative for cough, shortness of breath, wheezing    CARDIOVASCULAR:  negative for chest pain, palpitations, syncope, edema    GASTROINTESTINAL:  negative for nausea, vomiting    GENITOURINARY:  negative for incontinence    MUSCULOSKELETAL:  negative for neck or back pain    NEUROLOGICAL:  Negative for weakness and tingling  negative for headaches and dizziness    PSYCHIATRIC:  negative for anxiety      Review of systems otherwise negative. OBJECTIVE:     Vitals:    01/21/23 0800   BP: (!) 149/63   Pulse: 76   Resp: 18   Temp: 97.6 °F (36.4 °C)   SpO2: 98%        General:  Gen: normal habitus, NAD  HEENT: NCAT, mucosa moist  Cvs: RRR, S1 S2 normal  Resp: symmetric unlabored breathing  Abd: s/nd/nt  Ext: no edema  Skin: no lesions seen, warm and dry    Neuro:  Gen: Disoriented to Time, Place and Person  Lang/speech: Follows commands   CN: PERRL, EOMI, VFF, V1-3 intact, mild right face droop, hearing intact  Motor: grossly 5/5 UE and LE on the left, right arm 5/5, right leg amputated above knee 5/5 for stump  Sense: decrease on the left  Coord: deferred  DTR: deferred  Gait: deferred    NIH Stroke Scale:   1a  Level of consciousness: 0   1b. LOC questions:  0 - answers both questions correctly   1c. LOC commands: 0 - performs both tasks correctly   2. Best Gaze: 0 - normal   3. Visual: 0 - no visual loss   4. Facial Palsy: 0 - normal symmetric movement   5a. Motor left arm: 0 - no drift, limb holds 90 (or 45) degrees for full 10 seconds   5b. Motor right arm: 0   6a. Motor left le - no drift; leg holds 30 degree position for full 5 seconds   6b  Motor right le - no drift; leg holds 30 degree position for full 5 seconds (pt has R AKA)   7. Limb Ataxia: 0 - absent   8. Sensory: 0   9. Best Language:  0   10. Dysarthria: 0 - normal   11. Extinction and Inattention: 0 - no abnormality         Total:   0     MRS: 05 (baseline due to R AKA)      LABS:   Reviewed. Lab Results   Component Value Date    HGB 13.0 2023    WBC 11.4 (H) 2023     2023     2023    BUN 5 (L) 2023    CREATININE 0.79 2023    AST 16 2023    ALT 18 2023    MG 1.8 2023    APTT 50.3 (H) 2023    INR 1.9 2023      No results found for: COVID19    RADIOLOGY:   Images were personally reviewed including:   Cerebral angiogram 7/3/2019:  1. Right proximal immediately after the origin cervical ICA atherosclerotic changes resulting into a focal area of stenosis measuring approximately 65% per NASCET criteria. 2.  The above mentioned stenosis was treated with post balloon angioplasty and stenting using a 7-10x 30 mm Acculink stent with no significant residual stenosis. 3.  Right common femoral artery severe stenosis with good collaterals. CTA head neck 23: R ICA Acculink stent patent and L ICA chronic occlusion seen before 2019. Dominant R VA and chronically occluded L VA       MRI brain w/o contrast 23: punctate right temporal periventricular stroke. 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 Patient presented to the ED with AMS and found to have glucose of 620 and possible sepsis.   Endovascular consulted for chronic L ICA and L VA occlusion and hx of R ICA stent in 2019. Neuro exam was stable    1/9/23: L apical thrombus    L SA origin stent on the 1/16/2023    Coumadin started on 1/17/23  PLAN:   - no intervention from neuroendovascular standpoint, CTA is stable and L ICA and L VA occlusion are chronic and known before 2019  - R ICA stent is patent  - patient has a punctate right temporal periventricular stroke, he is currently already on aspirin, recommend continue it  - patient is on heparin for ACS/Left ventricle thrombus, cardiology managing  - ICU management   - PT OT and SLP  - patient can be discharged from endovascular standpoint, follow up with Neuro endovascular clinic in 6 months with Dr July Pope       Case discussed with Dr. Tish Alvarenga, patient to follow up with July Pope attending.     Kimi Grayson MD  Stroke, [facility]  [id number]  Electronically signed 1/21/2023 at 10:39 AM

## 2023-01-21 NOTE — PLAN OF CARE
Problem: Discharge Planning  Goal: Discharge to home or other facility with appropriate resources  Outcome: Progressing     Problem: Chronic Conditions and Co-morbidities  Goal: Patient's chronic conditions and co-morbidity symptoms are monitored and maintained or improved  Outcome: Progressing     Problem: Confusion  Goal: Confusion, delirium, dementia, or psychosis is improved or at baseline  Description: INTERVENTIONS:  1. Assess for possible contributors to thought disturbance, including medications, impaired vision or hearing, underlying metabolic abnormalities, dehydration, psychiatric diagnoses, and notify attending LIP  2. [place] high risk fall precautions, as indicated  3. Provide frequent short contacts to provide reality reorientation, refocusing and direction  4. Decrease environmental stimuli, including noise as appropriate  5. Monitor and intervene to maintain adequate nutrition, hydration, elimination, sleep and activity  6. If unable to ensure safety without constant attention obtain sitter and review sitter guidelines with assigned personnel  7. Initiate Psychosocial CNS and Spiritual Care consult, as indicated  Outcome: Progressing     Problem: Skin/Tissue Integrity  Goal: Absence of new skin breakdown  Description: 1. Monitor for areas of redness and/or skin breakdown  2. Assess vascular access sites hourly  3. Every 4-6 hours minimum:  Change oxygen saturation probe site  4. Every 4-6 hours:  If on nasal continuous positive airway pressure, respiratory therapy assess nares and determine need for appliance change or resting period.   Outcome: Progressing     Problem: Safety - Adult  Goal: Free from fall injury  Outcome: Progressing     Problem: ABCDS Injury Assessment  Goal: Absence of physical injury  Outcome: Progressing     Problem: Pain  Goal: Verbalizes/displays adequate comfort level or baseline comfort level  Outcome: Progressing     Problem: Respiratory - Adult  Goal: Achieves optimal ventilation and oxygenation  Outcome: Progressing

## 2023-01-21 NOTE — PROGRESS NOTES
[facility]  Progress Note    Date:   1/21/2023  Patient name:  Douglas Ibrahim  Date of admission:  1/7/2023  4:22 PM  MRN:   [id number]  YOB: 1965    SUBJECTIVE/Last 24 hours update:     Patient seen and examined at bedside. No acute events reported overnight. Patient's INR is 1.9 today. Currently waiting till INR is therapeutic before discharging. Replacing magnesium and potassium. Patient denies chest pain, shortness of breath, abdominal pain, nausea and vomiting. Notes from nursing staff and Consults had been reviewed, and the overnight progress had been checked with the nursing staff as well. HPI:     Patient is a 60 yo male admitted on 1/7/23 for AMS. Last well known was 1/5/23. Patient was found to be disheveled and having poor intake. In the ER patient was found to be hypertensive and elevated blood glucose levels (concern for DKA)  EKG showed STEMI, cardiology recommended starting the patient on Heparin gtt  CTA head showed severe stenosis in the left subclavian artery 2/2 noncalcified atheroscleotic plaque at the level of left vertebral artery. Patient was transferred to ICU for Hypertensive emergency and DKA management     In the ICU patient was assessed by cardiology and neurology and underwent cardiac cath which showed significant multivessel CAD, and patent grafts. Cardiology recommended continued medical therapy. Patient received Nitro drip for HTN emergency and was weaned off  Endovascular surgery was consulted for stenosis of subclavian artery but no intervention was recommended by them. Neurology was consulted for AMS did MRI brain which showed tiny acute infarcts in R temporal region per neurology not the cause of encephalopathy and so LTME was done which did not show electrographic seizures.         REVIEW OF SYSTEMS:      CONSTITUTIONAL:  no fevers, no headcahes  EYES: negative for blury vision  HEENT: No headaches, No nasal congestion, no difficulty swallowing  RESPIRATORY:negative for dyspnea, no wheezing, no Cough  CARDIOVASCULAR: negative for chest pain, no palpitations  GASTROINTESTINAL: no nausea, no vomiting, no change in bowel habits, no abdominal pain   GENITOURINARY: negative for dysuria, no hematuria   MUSCULOSKELETAL: no joint pains, no muscle aches, no swelling of joints or extremities  NEUROLOGICAL: No  Weakness or numbness      PAST MEDICAL HISTORY:      has a past medical history of CAD (coronary artery disease), Cardiac arrest ([facility] [address] Diabetes mellitus ([facility] [address] Hyperlipidemia, Hypertension, Osteolysis, PEA (Pulseless electrical activity) ([facility] [address] PVD (peripheral vascular disease) ([facility] [address] and Wound, open. PAST SURGICAL HISTORY:      has a past surgical history that includes Femur fracture surgery (Right, 2000); Cardiac catheterization (04/07/2019); Femur Surgery; Tonsillectomy (1974); Coronary artery bypass graft (04/07/2019); Hardware Removal (Right, 04/07/2019); Tibia fracture surgery (Right, 2000); Coronary artery bypass graft (N/A, 04/07/2019); RECONSTRUCTIVE REPAIR STERNAL (N/A, 04/08/2019); Hardware Removal (Right, 04/22/2019); REMOVE HARDWARE FEMUR (N/A, 04/22/2019); Carotid stent placement (07/03/2019); Leg amputation below knee (Right, 08/25/2019); Leg amputation below knee (Right, 08/27/2019); above knee amputation (Right, 09/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 SOCIAL HISTORY:      reports that he has been smoking cigarettes. He started smoking about 2 years ago. He has a 20.00 pack-year smoking history. He has never used smokeless tobacco. He reports that he does not drink alcohol and does not use drugs.      FAMILY HISTORY:     family history includes Diabetes in his brother, father, and mother; Heart Disease in his brother, father, and mother; High Blood Pressure in his brother, father, and sister; Stroke in his father and mother. HOME MEDICATIONS:      Prior to Admission medications    Medication Sig Start Date End Date Taking? Authorizing Provider   metoprolol tartrate (LOPRESSOR) 25 MG tablet Take 2 tablets by mouth 2 times daily 8/24/22   Jerardo Carey MD   clopidogrel (PLAVIX) 75 MG tablet TAKE 1 TABLET BY MOUTH DAILY 2/17/22   Sissy Hair MD   gabapentin (NEURONTIN) 300 MG capsule TAKE 1 CAPSULE BY MOUTH EVERY 8 HOURS 2/17/22 3/19/22  Sissy Hair MD   metFORMIN (GLUCOPHAGE) 1000 MG tablet Take 1 tablet by mouth 2 times daily (with meals) 1/13/22 3/14/22  Sissy Hair MD   insulin lispro, 1 Unit Dial, (HUMALOG KWIKPEN) 100 UNIT/ML SOPN INJECT SUBCUTANEOUSLY BEFORE MEALS AS DIRECTED PER SLIDING SCALE: SEE ATTACHED FOR SLIDING SCALE DIRECTIONS: Hold all insulin and notify Dr immediately if BG <60. -200: 2 UNITS; -250: 4UNITS; 251-300 6 UNITS; 301-350: 8 UNITS  Patient not taking: Reported on 1/13/2022 1/5/22   Sissy Hair MD   lisinopril-hydroCHLOROthiazide (PRINZIDE;ZESTORETIC) 20-25 MG per tablet Take 2 tablets by mouth daily .   Take 1 pill if blood pressure is less than 130/80 12/23/21   Sissy Hair MD   insulin glargine (LANTUS SOLOSTAR) 100 UNIT/ML injection pen Inject 24 Units into the skin nightly 11/9/21 1/13/22  Yusra Vitale MD   Insulin Syringe-Needle U-100 30G X 5/16\" 1 ML MISC 1 each by Does not apply route daily 11/9/21   Yusra Vitale MD   atorvastatin (LIPITOR) 40 MG tablet Take 1 tablet by mouth nightly 10/19/21   Chelsea Colunga MD   metoprolol tartrate (LOPRESSOR) 50 MG tablet Take 1 tablet by mouth 2 times daily 10/19/21   Chelsea Colunga MD   blood glucose test strips (ONETOUCH VERIO) strip TEST 2 TIMES A DAY & AS NEEDED FOR SYMPTOMS OF IRREGULAR BLOOD GLUCOSE. 5/28/21   Aldo Winter MD   Alcohol Swabs (B-D SINGLE USE SWABS REGULAR) PADS USE AS DIRECTED WITH TESTING SUPPLIES 5/28/21   Aldo Winter MD   Lancets (ONETOUCH DELICA PLUS [id number]) MISC USE AS DIRECTED TO TEST BLOOD SUGAR 5/28/21   Artie Vizcarra MD   sildenafil (VIAGRA) 100 MG tablet Take 1 tablet by mouth as needed for Erectile Dysfunction 1/7/21   Artie Vizcarra MD   Blood Glucose Monitoring Suppl (ONE TOUCH ULTRA 2) w/Device KIT U UTD ONCE D 9/27/19   Historical Provider, MD   acetaminophen (TYLENOL) 500 MG tablet Take 2 tablets by mouth every 8 hours as needed for Pain 9/30/19   Kylah Colorado,    Handicap Placard MISC by Does not apply route For 5 years 9/27/19   Artie Vizcarra MD   Blood Glucose Monitoring Suppl [facility] BLOOD GLUCOSE METER) w/Device KIT 1 kit by Does not apply route once for 1 dose 9/27/19 9/27/19  Artie Vizcarra MD   Insulin Pen Needle 32G X 6 MM MISC 1 each by Does not apply route daily 9/27/19   Artie Vizcarra MD   furosemide (LASIX) 20 MG tablet Take 1 tablet by mouth daily  Patient not taking: Reported on 11/16/2021 7/4/19   Gayle Bryant APRN - CNP   aspirin 81 MG chewable tablet Take 1 tablet by mouth daily 5/10/19   Kiran Fox MD   Multiple Vitamins-Minerals (THERAPEUTIC MULTIVITAMIN-MINERALS) tablet Take 1 tablet by mouth daily (with breakfast) 5/10/19   Kiran Fox MD       ALLERGIES:     Patient has no known allergies. OBJECTIVE:       Vitals:    01/21/23 0149 01/21/23 0326 01/21/23 0400 01/21/23 0800   BP:  126/68  (!) 149/63   Pulse:  72 73 76   Resp:  18  18   Temp:  97.4 °F (36.3 °C)  97.6 °F (36.4 °C)   TempSrc:  Oral  Oral   SpO2:  97%  98%   Weight: 142 lb 3.2 oz (64.5 kg)      Height:             Intake/Output Summary (Last 24 hours) at 1/21/2023 7280  Last data filed at 1/21/2023 0800  Gross per 24 hour   Intake 1700 ml   Output 3875 ml   Net -2175 ml       PHYSICAL EXAM:  General Appearance  Alert , awake , not in acute distress  HEENT - Head is normocephalic, atraumatic. Lungs - Bilateral equal air entry , no wheezes, rales or rhonchi, aeration good  Cardiovascular - Heart sounds are normal.  Regular rhythm, normal rate without murmur, gallop or rub.   Abdomen - Soft, nontender, nondistended, no masses or organomegaly  Neurologic - There are no new focal motor or sensory deficits  Skin - No bruising or bleeding on exposed skin area  Extremities - No cyanosis, clubbing or edema      DIAGNOSTICS:     Laboratory Testing:    Recent Results (from the past 24 hour(s))   POC Glucose Fingerstick    Collection Time: 01/20/23 11:16 AM   Result Value Ref Range    POC Glucose 369 (H) 75 - 110 mg/dL   POC Glucose Fingerstick    Collection Time: 01/20/23  4:33 PM   Result Value Ref Range    POC Glucose 93 75 - 110 mg/dL   POC Glucose Fingerstick    Collection Time: 01/20/23  8:21 PM   Result Value Ref Range    POC Glucose 238 (H) 75 - 110 mg/dL   CBC with Auto Differential    Collection Time: 01/21/23  3:41 AM   Result Value Ref Range    WBC 11.4 (H) 3.5 - 11.3 k/uL    RBC 4.40 4.21 - 5.77 m/uL    Hemoglobin 13.0 13.0 - 17.0 g/dL    Hematocrit 40.5 (L) 40.7 - 50.3 %    MCV 92.0 82.6 - 102.9 fL    MCH 29.5 25.2 - 33.5 pg    MCHC 32.1 28.4 - 34.8 g/dL    RDW 13.8 11.8 - 14.4 %    Platelets 666 178 - 031 k/uL    MPV 10.6 8.1 - 13.5 fL    NRBC Automated 0.0 0.0 per 100 WBC    Seg Neutrophils 59 36 - 65 %    Lymphocytes 30 24 - 43 %    Monocytes 8 3 - 12 %    Eosinophils % 2 1 - 4 %    Basophils 1 0 - 2 %    Immature Granulocytes 0 0 %    Segs Absolute 6.67 1.50 - 8.10 k/uL    Absolute Lymph # 3.46 1.10 - 3.70 k/uL    Absolute Mono # 0.87 0.10 - 1.20 k/uL    Absolute Eos # 0.25 0.00 - 0.44 k/uL    Basophils Absolute 0.10 0.00 - 0.20 k/uL    Absolute Immature Granulocyte 0.03 0.00 - 0.30 k/uL   Protime-INR    Collection Time: 01/21/23  3:41 AM   Result Value Ref Range    Protime 19.6 (H) 9.1 - 12.3 sec    INR 1.9    Magnesium    Collection Time: 01/21/23  3:41 AM   Result Value Ref Range    Magnesium 1.8 1.6 - 2.6 mg/dL   Basic Metabolic Panel    Collection Time: 01/21/23  3:41 AM   Result Value Ref Range    Glucose 128 (H) 70 - 99 mg/dL    BUN 5 (L) 6 - 20 mg/dL    Creatinine 0.79 0.70 - 1.20 mg/dL Est, Glom Filt Rate >60 >60 mL/min/1.73m2    Calcium 9.0 8.6 - 10.4 mg/dL    Sodium 135 135 - 144 mmol/L    Potassium 3.6 (L) 3.7 - 5.3 mmol/L    Chloride 100 98 - 107 mmol/L    CO2 26 20 - 31 mmol/L    Anion Gap 9 9 - 17 mmol/L   APTT    Collection Time: 01/21/23  3:41 AM   Result Value Ref Range    PTT 50.3 (H) 20.5 - 30.5 sec   POC Glucose Fingerstick    Collection Time: 01/21/23  6:46 AM   Result Value Ref Range    POC Glucose 77 75 - 110 mg/dL         Imaging/Diagonstics:  EKG: Ismael Talley     CXR:     Current Facility-Administered Medications   Medication Dose Route Frequency Provider Last Rate Last Admin    amLODIPine (NORVASC) tablet 10 mg  10 mg Oral Daily Dragan Mcmillan MD   10 mg at 01/21/23 6759    carvedilol (COREG) tablet 25 mg  25 mg Oral BID  ISAAC Babb CNP   25 mg at 01/21/23 6765    potassium chloride (KLOR-CON M) extended release tablet 40 mEq  40 mEq Oral PRN Dragan Bermeo MD   40 mEq at 01/19/23 7843    Or    potassium bicarb-citric acid (EFFER-K) effervescent tablet 40 mEq  40 mEq Oral PRN Dragan Bermeo MD   40 mEq at 01/20/23 1557    insulin lispro (HUMALOG) injection vial 0-8 Units  0-8 Units SubCUTAneous TID  Royal Bonilla MD   8 Units at 01/20/23 1145    insulin lispro (HUMALOG) injection vial 0-4 Units  0-4 Units SubCUTAneous Nightly Royal Bonilla MD        insulin glargine (LANTUS) injection vial 15 Units  15 Units SubCUTAneous Nightly Royal Bonilla MD   15 Units at 01/20/23 2053    furosemide (LASIX) tablet 40 mg  40 mg Oral Daily ISAAC Hoffman CNP   40 mg at 01/21/23 2411    warfarin placeholder: dosing by pharmacy   Other RX Placeholder ISAAC Hoffman CNP        acetaminophen (TYLENOL) tablet 650 mg  650 mg Oral Q4H PRN Facundo Whaley MD        famotidine (PEPCID) tablet 20 mg  20 mg Oral BID Grisel Tadeo MD   20 mg at 01/21/23 0821    atorvastatin (LIPITOR) tablet 80 mg  80 mg Oral Nightly Edith Baker MD   80 mg at 01/20/23 2054    lacosamide (VIMPAT) tablet 100 mg  100 mg Oral BID Henrique Hector, APRN - CNP   100 mg at 01/21/23 0820    levETIRAcetam (KEPPRA) tablet 500 mg  500 mg Oral BID Henrique si, APRN - CNP   500 mg at 01/21/23 0820    lisinopril (PRINIVIL;ZESTRIL) tablet 20 mg  20 mg Oral Daily Oswaldo Javier, DO   20 mg at 01/21/23 0820    bisacodyl (DULCOLAX) EC tablet 5 mg  5 mg Oral Daily PRN Eugene Ramirez MD        heparin 25,000 units in dextrose 5 % 250 mL infusion (rate based)  5-30 Units/kg/hr IntraVENous Continuous Yusra Vo MD 14.3 mL/hr at 01/20/23 1823 21 Units/kg/hr at 01/20/23 1823    glucose chewable tablet 16 g  4 tablet Oral PRN Nicole Oleary MD        dextrose bolus 10% 125 mL  125 mL IntraVENous PRN Nicole Oleary MD        Or    dextrose bolus 10% 250 mL  250 mL IntraVENous PRN Nicole Oleary MD        glucagon (rDNA) injection 1 mg  1 mg SubCUTAneous PRN Nicole Oleary MD        dextrose 10 % infusion   IntraVENous Continuous GEOVANNA Oleary MD        heparin (porcine) injection 4,000 Units  4,000 Units IntraVENous PRN Yusra Vo MD   4,000 Units at 01/10/23 1538    heparin (porcine) injection 2,000 Units  2,000 Units IntraVENous PRN Yusra Vo MD   2,000 Units at 01/17/23 1211    aspirin chewable tablet 81 mg  81 mg Oral Daily Yusra Vo MD   81 mg at 01/21/23 1488    therapeutic multivitamin-minerals 1 tablet  1 tablet Oral Daily with breakfast Yusra Vo MD   1 tablet at 01/21/23 0820    dextrose bolus 10% 125 mL  125 mL IntraVENous PRN Yusra Vo MD   Stopped at 01/10/23 1139    Or    dextrose bolus 10% 250 mL  250 mL IntraVENous PRN Yusra Vo MD        potassium chloride 10 mEq/100 mL IVPB (Peripheral Line)  10 mEq IntraVENous PRN Yusra Vo  mL/hr at 01/16/23 1142 10 mEq at 01/16/23 1142    magnesium sulfate 1000 mg in dextrose 5% 100 mL IVPB  1,000 mg IntraVENous PRN Yusra Vo MD   Stopped at 01/20/23 1228 sodium phosphate 10 mmol in sodium chloride 0.9 % 250 mL IVPB  10 mmol IntraVENous PRN Arya Desai MD   Stopped at 01/10/23 1327    Or    sodium phosphate 15 mmol in dextrose 5 % 250 mL IVPB  15 mmol IntraVENous PRN Arya Desai MD   Stopped at 01/08/23 1315    Or    sodium phosphate 20 mmol in dextrose 5 % 500 mL IVPB  20 mmol IntraVENous PRN Arya Desai MD        sodium chloride flush 0.9 % injection 5-40 mL  5-40 mL IntraVENous 2 times per day Arya Desai MD   10 mL at 01/20/23 2054    sodium chloride flush 0.9 % injection 5-40 mL  5-40 mL IntraVENous PRN Arya Desai MD        0.9 % sodium chloride infusion   IntraVENous PRN Arya Desai MD   Stopped at 01/14/23 1603    ondansetron (ZOFRAN-ODT) disintegrating tablet 4 mg  4 mg Oral Q8H PRN Arya Desai MD        Or    ondansetron (ZOFRAN) injection 4 mg  4 mg IntraVENous Q6H PRN Arya Desai MD        polyethylene glycol (GLYCOLAX) packet 17 g  17 g Oral Daily PRN Arya Desai MD        acetaminophen (TYLENOL) tablet 650 mg  650 mg Oral Q6H PRN Arya Desai MD        Or    acetaminophen (TYLENOL) suppository 650 mg  650 mg Rectal Q6H PRN Arya Desai MD           ASSESSMENT:     Principal Problem:    Altered mental status  Active Problems:    DKA, type 2, not at goal [facility])    Stenosis of right middle cerebral artery    At risk of seizures    Cardiomyopathy, ischemic    Acute ischemic right MCA stroke ([facility] [address]    Acute metabolic encephalopathy    Cerebral multi-infarct state  Resolved Problems:    * No resolved hospital problems. *      PLAN:     Discussed care plan with nurse after getting her input.     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   -Aspirin 81 mg daily and Lipitor 80 mg nightly  -Endovascular on board:              -no intervention, CTA is stable and L ICA and L VA occlusion are chronic and known before 2019              -F/u neuro endovascular clinic in 6 months  -Vascular signed off              -Patient status post subclavian artery stent, recovering well, continue anticoagulation        Left ventricle apical thrombus 1.4 cm X0 0.6 cm, LVEF 35-40%, MV-CAD s/p PEA arrest, s/p CABG 2019  -ECHO 1/9: Left ventricle apical thrombus- On Heparin Gtt              -Will need to be transition to [facility] at discharge  -Cardiology on board:              -S/p cardiac cath 1/13              -Continue heparin for LV thrombus, IV Lasix 40 mg twice daily, Coreg 12.5 mg twice daily, aspirin 81 mg daily, statin 40 mg nightly, lisinopril 20 mg daily  -Potassium and magnesium replacement protocols in place                          -now patient on warfarin and being bridged with heparin until reaches therapeutic INR, cardiology will clear for DC once INR is in therapeutic range, INR today is at 1.9        -Cardiac cath done 1/13 showed multivessel CAD              -OM1 has high origin with ostial 90% stenosis              -LCx: Small caliber vessel. Proximal and distal 70% stenosis              -99% stenosis at proximal left subclavian artery at origin of LIMA, leading to its underfilling. Systolic CHF  -EF 77-05%, moderate reduction in LV systolic function  -Continue IV lasix 40 mg BID, Coreg 12.5 mg BID, lisinopril 20 mg QD  -Cardiology on board-want to continue ACE inhibitor's, beta-blockers and IV diuresis with close monitoring of renal function.   And continue aspirin and statin        T2DM  -Medium dose sliding scale  -Hypoglycemia protocol in place  -Glucose normalized  -Lantus to 15 units        AMS   -Neurology on board:              -No driving for 6 months              -Continue Keppra and Vimpat on discharge              -Signed off  -MRI brain showed tiny acute infarct   -LTME no seizure activty  -Continue ASA 81 mg QD, Lipitor 80 mg nightly  -Continue IV Keppra 500 mg BID and Vimpat 100 mg BID        Hypertension  -On coreg and Lisinopril  -Norvasc increased to 10mg  -Hydrazine PRN         Gi Ppx: IV Pepcid 20 mg twice daily  DVT Ppx: on heparin and warfarin          Plan will be discussed with the attending, Dr. Ion Harvey MD  Family Medicine Resident  1/21/2023 9:26 AM            Please note that this chart was generated using voice recognition Dragon dictation software.   Although every effort was made to ensure the accuracy of this automated transcription, some errors in transcription may have occurred

## 2023-01-22 VITALS
BODY MASS INDEX: 23.69 KG/M2 | SYSTOLIC BLOOD PRESSURE: 124 MMHG | HEIGHT: 65 IN | OXYGEN SATURATION: 95 % | RESPIRATION RATE: 18 BRPM | TEMPERATURE: 98.5 F | DIASTOLIC BLOOD PRESSURE: 64 MMHG | WEIGHT: 142.2 LBS | HEART RATE: 72 BPM

## 2023-01-22 LAB
ABSOLUTE EOS #: 0.28 K/UL (ref 0–0.44)
ABSOLUTE IMMATURE GRANULOCYTE: <0.03 K/UL (ref 0–0.3)
ABSOLUTE LYMPH #: 3.34 K/UL (ref 1.1–3.7)
ABSOLUTE MONO #: 0.74 K/UL (ref 0.1–1.2)
ANION GAP SERPL CALCULATED.3IONS-SCNC: 10 MMOL/L (ref 9–17)
BASOPHILS # BLD: 1 % (ref 0–2)
BASOPHILS ABSOLUTE: 0.09 K/UL (ref 0–0.2)
BUN BLDV-MCNC: 9 MG/DL (ref 6–20)
CALCIUM SERPL-MCNC: 9.4 MG/DL (ref 8.6–10.4)
CHLORIDE BLD-SCNC: 102 MMOL/L (ref 98–107)
CO2: 26 MMOL/L (ref 20–31)
CREAT SERPL-MCNC: 0.75 MG/DL (ref 0.7–1.2)
EOSINOPHILS RELATIVE PERCENT: 3 % (ref 1–4)
GFR SERPL CREATININE-BSD FRML MDRD: >60 ML/MIN/1.73M2
GLUCOSE BLD-MCNC: 124 MG/DL (ref 70–99)
GLUCOSE BLD-MCNC: 136 MG/DL (ref 75–110)
GLUCOSE BLD-MCNC: 202 MG/DL (ref 75–110)
GLUCOSE BLD-MCNC: 211 MG/DL (ref 75–110)
HCT VFR BLD CALC: 43 % (ref 40.7–50.3)
HEMOGLOBIN: 14 G/DL (ref 13–17)
IMMATURE GRANULOCYTES: 0 %
INR BLD: 2.2
LYMPHOCYTES # BLD: 34 % (ref 24–43)
MCH RBC QN AUTO: 29.6 PG (ref 25.2–33.5)
MCHC RBC AUTO-ENTMCNC: 32.6 G/DL (ref 28.4–34.8)
MCV RBC AUTO: 90.9 FL (ref 82.6–102.9)
MONOCYTES # BLD: 8 % (ref 3–12)
NRBC AUTOMATED: 0 PER 100 WBC
PARTIAL THROMBOPLASTIN TIME: 62.1 SEC (ref 20.5–30.5)
PDW BLD-RTO: 13.9 % (ref 11.8–14.4)
PLATELET # BLD: 388 K/UL (ref 138–453)
PMV BLD AUTO: 11.1 FL (ref 8.1–13.5)
POTASSIUM SERPL-SCNC: 4.4 MMOL/L (ref 3.7–5.3)
PROTHROMBIN TIME: 22.5 SEC (ref 9.1–12.3)
RBC # BLD: 4.73 M/UL (ref 4.21–5.77)
SEG NEUTROPHILS: 54 % (ref 36–65)
SEGMENTED NEUTROPHILS ABSOLUTE COUNT: 5.36 K/UL (ref 1.5–8.1)
SODIUM BLD-SCNC: 138 MMOL/L (ref 135–144)
WBC # BLD: 9.8 K/UL (ref 3.5–11.3)

## 2023-01-22 PROCEDURE — 82947 ASSAY GLUCOSE BLOOD QUANT: CPT

## 2023-01-22 PROCEDURE — 2580000003 HC RX 258

## 2023-01-22 PROCEDURE — 80048 BASIC METABOLIC PNL TOTAL CA: CPT

## 2023-01-22 PROCEDURE — 6370000000 HC RX 637 (ALT 250 FOR IP): Performed by: NURSE PRACTITIONER

## 2023-01-22 PROCEDURE — 6370000000 HC RX 637 (ALT 250 FOR IP)

## 2023-01-22 PROCEDURE — 85610 PROTHROMBIN TIME: CPT

## 2023-01-22 PROCEDURE — 36415 COLL VENOUS BLD VENIPUNCTURE: CPT

## 2023-01-22 PROCEDURE — 99239 HOSP IP/OBS DSCHRG MGMT >30: CPT | Performed by: STUDENT IN AN ORGANIZED HEALTH CARE EDUCATION/TRAINING PROGRAM

## 2023-01-22 PROCEDURE — 99233 SBSQ HOSP IP/OBS HIGH 50: CPT | Performed by: PSYCHIATRY & NEUROLOGY

## 2023-01-22 PROCEDURE — 85025 COMPLETE CBC W/AUTO DIFF WBC: CPT

## 2023-01-22 PROCEDURE — 85730 THROMBOPLASTIN TIME PARTIAL: CPT

## 2023-01-22 PROCEDURE — 6360000002 HC RX W HCPCS

## 2023-01-22 PROCEDURE — 6370000000 HC RX 637 (ALT 250 FOR IP): Performed by: STUDENT IN AN ORGANIZED HEALTH CARE EDUCATION/TRAINING PROGRAM

## 2023-01-22 PROCEDURE — 6370000000 HC RX 637 (ALT 250 FOR IP): Performed by: INTERNAL MEDICINE

## 2023-01-22 RX ORDER — AMLODIPINE BESYLATE 10 MG/1
10 TABLET ORAL DAILY
Qty: 30 TABLET | Refills: 3 | Status: SHIPPED | OUTPATIENT
Start: 2023-01-23

## 2023-01-22 RX ORDER — WARFARIN SODIUM 5 MG/1
5 TABLET ORAL DAILY
Qty: 30 TABLET | Refills: 1 | Status: SHIPPED | OUTPATIENT
Start: 2023-01-22

## 2023-01-22 RX ORDER — CARVEDILOL 25 MG/1
25 TABLET ORAL 2 TIMES DAILY WITH MEALS
Qty: 60 TABLET | Refills: 3 | Status: SHIPPED | OUTPATIENT
Start: 2023-01-22

## 2023-01-22 RX ORDER — ATORVASTATIN CALCIUM 80 MG/1
80 TABLET, FILM COATED ORAL NIGHTLY
Qty: 30 TABLET | Refills: 3 | Status: SHIPPED | OUTPATIENT
Start: 2023-01-22

## 2023-01-22 RX ORDER — FUROSEMIDE 40 MG/1
40 TABLET ORAL DAILY
Qty: 60 TABLET | Refills: 3 | Status: CANCELLED | OUTPATIENT
Start: 2023-01-22

## 2023-01-22 RX ORDER — AMLODIPINE BESYLATE 10 MG/1
10 TABLET ORAL DAILY
Qty: 30 TABLET | Refills: 3 | Status: CANCELLED | OUTPATIENT
Start: 2023-01-22

## 2023-01-22 RX ORDER — WARFARIN SODIUM 7.5 MG/1
7.5 TABLET ORAL
Status: COMPLETED | OUTPATIENT
Start: 2023-01-22 | End: 2023-01-22

## 2023-01-22 RX ORDER — LISINOPRIL 20 MG/1
20 TABLET ORAL DAILY
Qty: 30 TABLET | Refills: 3 | Status: SHIPPED | OUTPATIENT
Start: 2023-01-23

## 2023-01-22 RX ORDER — FUROSEMIDE 40 MG/1
40 TABLET ORAL DAILY
Qty: 60 TABLET | Refills: 3 | Status: SHIPPED | OUTPATIENT
Start: 2023-01-23

## 2023-01-22 RX ORDER — LEVETIRACETAM 500 MG/1
500 TABLET ORAL 2 TIMES DAILY
Qty: 60 TABLET | Refills: 3 | Status: SHIPPED | OUTPATIENT
Start: 2023-01-22

## 2023-01-22 RX ORDER — CARVEDILOL 25 MG/1
25 TABLET ORAL 2 TIMES DAILY WITH MEALS
Qty: 60 TABLET | Refills: 3 | Status: CANCELLED | OUTPATIENT
Start: 2023-01-22

## 2023-01-22 RX ADMIN — WARFARIN SODIUM 7.5 MG: 7.5 TABLET ORAL at 16:23

## 2023-01-22 RX ADMIN — LISINOPRIL 20 MG: 20 TABLET ORAL at 08:23

## 2023-01-22 RX ADMIN — CARVEDILOL 25 MG: 25 TABLET, FILM COATED ORAL at 08:22

## 2023-01-22 RX ADMIN — ASPIRIN 81 MG: 81 TABLET, CHEWABLE ORAL at 08:22

## 2023-01-22 RX ADMIN — INSULIN LISPRO 2 UNITS: 100 INJECTION, SOLUTION INTRAVENOUS; SUBCUTANEOUS at 13:30

## 2023-01-22 RX ADMIN — LACOSAMIDE 100 MG: 100 TABLET, FILM COATED ORAL at 08:22

## 2023-01-22 RX ADMIN — Medication 1 TABLET: at 08:22

## 2023-01-22 RX ADMIN — FAMOTIDINE 20 MG: 20 TABLET, FILM COATED ORAL at 08:22

## 2023-01-22 RX ADMIN — AMLODIPINE BESYLATE 10 MG: 10 TABLET ORAL at 08:22

## 2023-01-22 RX ADMIN — LEVETIRACETAM 500 MG: 500 TABLET, FILM COATED ORAL at 08:22

## 2023-01-22 RX ADMIN — SODIUM CHLORIDE, PRESERVATIVE FREE 10 ML: 5 INJECTION INTRAVENOUS at 08:22

## 2023-01-22 RX ADMIN — HEPARIN SODIUM 21 UNITS/KG/HR: 10000 INJECTION, SOLUTION INTRAVENOUS at 07:00

## 2023-01-22 RX ADMIN — FUROSEMIDE 40 MG: 40 TABLET ORAL at 08:22

## 2023-01-22 ASSESSMENT — PAIN SCALES - GENERAL: PAINLEVEL_OUTOF10: 0

## 2023-01-22 NOTE — DISCHARGE INSTR - COC
Continuity of Care Form    Patient Name: Teresa Reynolds   :  1965  MRN:  [id number]    Admit date:  2023  Discharge date:  23    Code Status Order: Full Code   Advance Directives:     Admitting Physician:  Gilberto Craft DO  PCP: Mirna Borges MD    Discharging Nurse: [facility] Unit/Room#:   Discharging Unit Phone Number: ***    Emergency Contact:   Extended Emergency Contact Information  Primary Emergency Contact: [address] Phone: [phone]  Relation: Other  Secondary Emergency Contact: [address] Phone: [phone]  Mobile Phone: [phone]  Relation: Child    Past Surgical History:  Past Surgical History:   Procedure Laterality Date    ABOVE KNEE AMPUTATION Right 2019    AMPUTATION ABOVE KNEE Right 2019    RIGHT ABOVE KNEE AMPUTATION performed by Cristin Medrano MD at [address] [place] [address]  2019    MVD-Stv    CAROTID STENT PLACEMENT  2019    ACCULINK CAROTID STENT PLACED, MRI CONDITIONAL 5, 3T OK.      CORONARY ARTERY BYPASS GRAFT  04/07/2019    x4 per Dr. Brock Marks, [address] GRAFT N/A 2019    EMERGENT CPR,  CORONARY ARTERY BYPASS X4, ON PUMP ,CHEST LEFT OPEN; SWAN, ANGELA PER ANESTHESIA performed by Dakota Locke MD at [facility]      unsure if correct known tibial surgery    HARDWARE REMOVAL Right 2019    R Knee internal screw protruding through skin    HARDWARE REMOVAL Right 2019    HARDWARE REMOVAL RIGHT TIBIA,    INSERT / REPLACE / REMOVE PACEMAKER  2023    Epicardial leads present from CABG - abandoned leads - NOT MRI Safe - NO MRI - KMM (MRI Tech)    LEG AMPUTATION BELOW KNEE Right 2019    RIGHT FOOT GUILLOTINE AMPUTATION performed by Cristin Medrano MD at [place] [address] Right 2019    RIGHT BELOW KNEE AMPUTATION FORMALIZATION performed by Vick Molina MD at [address] N/A 04/08/2019    STERNAL  WOUND WASHOUT AND CLOSURE S/P CABG performed by Jhonny Cadena MD at [facility] N/A 04/22/2019    HARDWARE REMOVAL RIGHT TIBIA, IRRIGATION AND DEBRIDEMENT [address] performed by Jamila Biggs MD at [facility] 2000    [address] off roof, external fixation and internal hardware    TONSILLECTOMY  1974       Immunization History:   Immunization History   Administered Date(s) Administered    Hepatitis B Adult (Engerix-B) 12/10/2020, 01/07/2021    Influenza, FLUARIX, FLULAVAL, FLUZONE (age 10 mo+) AND AFLURIA, (age 1 y+), PF, 0.5mL 09/27/2019    Pneumococcal Polysaccharide ([id number]) 09/27/2019    Tdap (Boostrix, Adacel) 11/11/2015    Zoster Recombinant (Shingrix) 11/08/2019, 03/11/2020       Active Problems:  Patient Active Problem List   Diagnosis Code    PAD (peripheral artery disease) ([facility]) I73.9    Subacute osteomyelitis of right tibia ([facility]) C93.738    Acute metabolic encephalopathy Z28.88    Abnormal CT of the head R93.0    Hepatitis K75.9    Cerebral multi-infarct state I69.30    Multi infarct state I69.30    Carotid stenosis, bilateral I65.23    Right foot drop M21.371    Thrombocytosis D75.839    Acute CVA (cerebrovascular accident) ([facility] I63.9    Moderate malnutrition ([facility]) E44.0    Cellulitis of right lower extremity L03.115    Right-sided extracranial carotid artery stenosis I65.21    Essential hypertension I10    Type 2 diabetes mellitus with diabetic neuropathy, without long-term current use of insulin ([facility]) E11.40    Leukocytosis D72.829    Status post angiography of extremity Z98.890    Ischemic gangrene ([facility] I96    Pyogenic inflammation of bone ([facility]) M86.9    Coronary artery disease I25.10    Above knee amputation of right lower extremity ([facility]) S78.111A    Pressure injury of right heel, unstageable ([facility]) L89.610    Hx of AKA (above knee amputation), right ([facility]) Z89.611    Erectile dysfunction associated with type 2 diabetes mellitus ([facility]) E11.69, N52.1    Altered mental status R41.82    DKA, type 2, not at goal ([facility]) E11.10    Stenosis of right middle cerebral artery I66.01    At risk of seizures Z91.89    Cardiomyopathy, ischemic I25.5    Acute ischemic right MCA stroke ([facility]) I63.511    Presence of internal carotid stent Z95.828       Isolation/Infection:   Isolation            No Isolation          Patient Infection Status       None to display            Nurse Assessment:  Last Vital Signs: BP (!) 147/81   Pulse 82   Temp 98.1 °F (36.7 °C) (Oral)   Resp 16   Ht 5' 5\" (1.651 m)   Wt 142 lb 3.2 oz (64.5 kg)   SpO2 95%   BMI 23.66 kg/m²     Last documented pain score (0-10 scale): Pain Level: 0  Last Weight:   Wt Readings from Last 1 Encounters:   01/21/23 142 lb 3.2 oz (64.5 kg)     Mental Status:  oriented, alert, coherent, logical, and thought processes intact    IV Access:  - None    Nursing Mobility/ADLs:  Walking   Dependent  Transfer  Assisted  Bathing  Independent  Dressing  Independent  Toileting  Independent  Feeding  Independent  Med Admin  Independent  Med Delivery   whole    Wound Care Documentation and Therapy:  Incision (Active)   Number of days:        Incision Thigh Anterior;Distal;Right (Active)   Number of days:         Elimination:  Continence:   Bowel: Yes  Bladder: Yes  Urinary Catheter: None   Colostomy/Ileostomy/Ileal Conduit: No       Date of Last BM: 1/22/23    Intake/Output Summary (Last 24 hours) at 1/22/2023 1237  Last data filed at 1/22/2023 0642  Gross per 24 hour   Intake 480 ml   Output 2400 ml   Net -1920 ml     I/O last 3 completed shifts:  In: 1740 [P.O.:1740]  Out: 4000 [Urine:4000]    Safety Concerns:     None, At Risk for Falls, and amputee R leg    Impairments/Disabilities:      None    Nutrition Therapy:  Current Nutrition Therapy:   - Oral Diet:  General    Routes of Feeding:  Oral  Liquids: Thin Liquids  Daily Fluid Restriction: no  Last Modified Barium Swallow with Video (Video Swallowing Test): not done    Treatments at the Time of Hospital Discharge:   Respiratory Treatments: ***  Oxygen Therapy:  is on oxygen at *** L/min per nasal cannula. Ventilator:    - No ventilator support    Rehab Therapies: Physical Therapy and Occupational Therapy  Weight Bearing Status/Restrictions: No weight bearing restrictions  Other Medical Equipment (for information only, NOT a DME order):  walker  Other Treatments: ***    Patient's personal belongings (please select all that are sent with patient):  None, with family/patient on discharhe    RN SIGNATURE:  Electronically signed by Kirstie Ortega RN on 1/22/23 at 3:09 PM EST    CASE MANAGEMENT/SOCIAL WORK SECTION    Inpatient Status Date: 1/17/2023    Readmission Risk Assessment Score:  Readmission Risk              Risk of Unplanned Readmission:  21           Discharging to Facility/ Agency   Name: [facility]  Address:  Phone:  [phone]  Fax: [phone]    Dialysis Facility (if applicable)   Name:  Address:  Dialysis Schedule:  Phone:  Fax:    / signature: Electronically signed by Brennen Chapman RN on 1/22/23 at 2:30 PM EST    PHYSICIAN SECTION    Prognosis: Good    Condition at Discharge: Stable    Rehab Potential (if transferring to Rehab): Good    Recommended Labs or Other Treatments After Discharge: F/u with coumadin clinic. F/U with PCP in 1-2 weeks     Physician Certification: I certify the above information and transfer of Edgar Silva  is necessary for the continuing treatment of the diagnosis listed and that he requires Home Care for greater 30 days.      Update Admission H&P: No change in H&P    PHYSICIAN SIGNATURE:  Electronically signed by Real Tabares MD on 1/22/23 at 1:32 PM EST

## 2023-01-22 NOTE — PROGRESS NOTES
Endovascular Neurosurgery Progress Note    SUBJECTIVE:   Patient was awake and alert, patient stated he feels normal, no complaints. Wanting to go home    He is in good spirit today, INR therapeutic and he is off heparin    Review of Systems:  CONSTITUTIONAL:  negative for fevers, chills, fatigue and malaise    EYES:  negative for double vision, blurred vision and photophobia     HEENT:  negative for tinnitus, epistaxis and sore throat    RESPIRATORY:  negative for cough, shortness of breath, wheezing    CARDIOVASCULAR:  negative for chest pain, palpitations, syncope, edema    GASTROINTESTINAL:  negative for nausea, vomiting    GENITOURINARY:  negative for incontinence    MUSCULOSKELETAL:  negative for neck or back pain    NEUROLOGICAL:  Negative for weakness and tingling  negative for headaches and dizziness    PSYCHIATRIC:  negative for anxiety      Review of systems otherwise negative.    OBJECTIVE:     Vitals:    01/22/23 0815   BP: (!) 147/81   Pulse: 82   Resp: 16   Temp: 98.1 °F (36.7 °C)   SpO2:         General:  Gen: normal habitus, NAD  HEENT: NCAT, mucosa moist  Cvs: RRR, S1 S2 normal  Resp: symmetric unlabored breathing  Abd: s/nd/nt  Ext: no edema  Skin: no lesions seen, warm and dry    Neuro:  Gen: Disoriented to Time, Place and Person  Lang/speech: Follows commands   CN: PERRL, EOMI, VFF, V1-3 intact, mild right face droop, hearing intact  Motor: grossly 5/5 UE and LE on the left, right arm 5/5, right leg amputated above knee 5/5 for stump  Sense: decrease on the left  Coord: deferred  DTR: deferred  Gait: deferred    NIH Stroke Scale:   1a  Level of consciousness: 0   1b. LOC questions:  0 - answers both questions correctly   1c. LOC commands: 0 - performs both tasks correctly   2.  Best Gaze: 0 - normal   3. Visual: 0 - no visual loss   4. Facial Palsy: 0 - normal symmetric movement   5a. Motor left arm: 0 - no drift, limb holds 90 (or 45) degrees for full 10 seconds   5b.  Motor right arm: 0  6a. Motor left le - no drift; leg holds 30 degree position for full 5 seconds   6b  Motor right le - no drift; leg holds 30 degree position for full 5 seconds (pt has R AKA)   7. Limb Ataxia: 0 - absent   8. Sensory: 0   9. Best Language:  0   10. Dysarthria: 0 - normal   11. Extinction and Inattention: 0 - no abnormality         Total:   0     MRS: 05 (baseline due to R AKA)      LABS:   Reviewed. Lab Results   Component Value Date    HGB 14.0 2023    WBC 9.8 2023     2023     2023    BUN 9 2023    CREATININE 0.75 2023    AST 16 2023    ALT 18 2023    MG 1.8 2023    APTT 62.1 (H) 2023    INR 2.2 2023      No results found for: COVID19    RADIOLOGY:   Images were personally reviewed including:   Cerebral angiogram 7/3/2019:  1. Right proximal immediately after the origin cervical ICA atherosclerotic changes resulting into a focal area of stenosis measuring approximately 65% per NASCET criteria. 2.  The above mentioned stenosis was treated with post balloon angioplasty and stenting using a 7-10x 30 mm Acculink stent with no significant residual stenosis. 3.  Right common femoral artery severe stenosis with good collaterals. CTA head neck 23: R ICA Acculink stent patent and L ICA chronic occlusion seen before 2019. Dominant R VA and chronically occluded L VA       MRI brain w/o contrast 23: punctate right temporal periventricular stroke. 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 Patient presented to the ED with AMS and found to have glucose of 620 and possible sepsis.   Endovascular consulted for chronic L ICA and L VA occlusion and hx of R ICA stent in 2019. Neuro exam was stable    1/9/23: L apical thrombus    L SA origin stent by vascular surgery on the 1/16/2023    Coumadin started on 1/17/23  PLAN:   - no intervention from neuroendovascular standpoint, CTA is stable and L ICA and L VA occlusion are chronic and known before 2019  - R ICA stent is patent  - patient has a punctate right temporal periventricular stroke, he is currently already on aspirin, recommend continue it  - patient is on coumadin for ACS/Left ventricle thrombus, cardiology managing  - ICU management   - PT OT and SLP  - patient can be discharged from endovascular standpoint, follow up with Neuro endovascular clinic in 6 months with Dr Fabián Villalobos       Case discussed with Dr. Rolando Banuelos, patient to follow up with Fabián Villalobos attending.     Naeem Lainez MD  Stroke, [facility]  [facility]  Electronically signed 1/22/2023 at 12:24 PM

## 2023-01-22 NOTE — CARE COORDINATION
Discharge [facility] Case Management Department  Written by: Helen Escamilla RN    Patient Name: Evans Pool  Attending Provider: Nida Zepeda DO  Admit Date: 2023  4:22 PM  MRN: [id number]  Account: [de-identified]                     : 1965  Discharge Date:  2023      Disposition: home - [facility], call to office made, updated Cleopatra with d/c today- [facility]/ALICJA, orders faxed to [phone]    Helen Escamilla RN

## 2023-01-22 NOTE — PROGRESS NOTES
[address] [facility]  Progress Note    Date:   1/22/2023  Patient name:  Mikayla Blum  Date of admission:  1/7/2023  4:22 PM  MRN:   [id number]  YOB: 1965    SUBJECTIVE/Last 24 hours update:     Patient seen and examined at bedside  He is vitally stable, on nitro drip  Blood pressure 147/81  Patient feels well today, no complaints  INR came back 2.2, in therapeutic range  Possible discharge today        HPI:     Patient is a 62 yo male admitted on 1/7/23 for AMS. Last well known was 1/5/23. Patient was found to be disheveled and having poor intake. In the ER patient was found to be hypertensive and elevated blood glucose levels (concern for DKA)  EKG showed STEMI, cardiology recommended starting the patient on Heparin gtt  CTA head showed severe stenosis in the left subclavian artery 2/2 noncalcified atheroscleotic plaque at the level of left vertebral artery. Patient was transferred to ICU for Hypertensive emergency and DKA management     In the ICU patient was assessed by cardiology and neurology and underwent cardiac cath which showed significant multivessel CAD, and patent grafts. Cardiology recommended continued medical therapy. Patient received Nitro drip for HTN emergency and was weaned off  Endovascular surgery was consulted for stenosis of subclavian artery but no intervention was recommended by them. Neurology was consulted for AMS did MRI brain which showed tiny acute infarcts in R temporal region per neurology not the cause of encephalopathy and so LTME was done which did not show electrographic seizures.         REVIEW OF SYSTEMS:      CONSTITUTIONAL:  no fevers, no headcahes  EYES: negative for blury vision  HEENT: No headaches, No nasal congestion, no difficulty swallowing  RESPIRATORY:negative for dyspnea, no wheezing, no Cough  CARDIOVASCULAR: negative for chest pain, no palpitations  GASTROINTESTINAL: no nausea, no vomiting, no change in bowel habits, no abdominal pain   GENITOURINARY: negative for dysuria, no hematuria   MUSCULOSKELETAL: no joint pains, no muscle aches, no swelling of joints or extremities  NEUROLOGICAL: No  Weakness or numbness      PAST MEDICAL HISTORY:      has a past medical history of CAD (coronary artery disease), Cardiac arrest ([facility] [address] Diabetes mellitus ([facility] [address] Hyperlipidemia, Hypertension, Osteolysis, PEA (Pulseless electrical activity) ([facility] [address] [facility] and Wound, open. PAST SURGICAL HISTORY:      has a past surgical history that includes Femur fracture surgery (Right, 2000); Cardiac catheterization (04/07/2019); Femur Surgery; Tonsillectomy (1974); Coronary artery bypass graft (04/07/2019); Hardware Removal (Right, 04/07/2019); Tibia fracture surgery (Right, 2000); Coronary artery bypass graft (N/A, 04/07/2019); RECONSTRUCTIVE REPAIR STERNAL (N/A, 04/08/2019); Hardware Removal (Right, 04/22/2019); REMOVE HARDWARE FEMUR (N/A, 04/22/2019); Carotid stent placement (07/03/2019); Leg amputation below knee (Right, 08/25/2019); Leg amputation below knee (Right, 08/27/2019); above knee amputation (Right, 09/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 SOCIAL HISTORY:      reports that he has been smoking cigarettes. He started smoking about 2 years ago. He has a 20.00 pack-year smoking history. He has never used smokeless tobacco. He reports that he does not drink alcohol and does not use drugs. FAMILY HISTORY:     family history includes Diabetes in his brother, father, and mother; Heart Disease in his brother, father, and mother; High Blood Pressure in his brother, father, and sister; Stroke in his father and mother. HOME MEDICATIONS:      Prior to Admission medications    Medication Sig Start Date End Date Taking?  Authorizing Provider   metoprolol tartrate (LOPRESSOR) 25 MG tablet Take 2 tablets by mouth 2 times daily 8/24/22   Ian Sargent MD   clopidogrel (PLAVIX) 75 MG tablet TAKE 1 TABLET BY MOUTH DAILY 2/17/22   Maranda Ceja MD   gabapentin (NEURONTIN) 300 MG capsule TAKE 1 CAPSULE BY MOUTH EVERY 8 HOURS 2/17/22 3/19/22  Maranda Ceja MD   metFORMIN (GLUCOPHAGE) 1000 MG tablet Take 1 tablet by mouth 2 times daily (with meals) 1/13/22 3/14/22  Maranda Ceja MD   insulin lispro, 1 Unit Dial, (HUMALOG KWIKPEN) 100 UNIT/ML SOPN INJECT SUBCUTANEOUSLY BEFORE MEALS AS DIRECTED PER SLIDING SCALE: SEE ATTACHED FOR SLIDING SCALE DIRECTIONS: Hold all insulin and notify Dr immediately if BG <60. -200: 2 UNITS; -250: 4UNITS; 251-300 6 UNITS; 301-350: 8 UNITS  Patient not taking: Reported on 1/13/2022 1/5/22   Maranda Ceja MD   lisinopril-hydroCHLOROthiazide (PRINZIDE;ZESTORETIC) 20-25 MG per tablet Take 2 tablets by mouth daily .   Take 1 pill if blood pressure is less than 130/80 12/23/21   Maranda Ceja MD   insulin glargine (LANTUS SOLOSTAR) 100 UNIT/ML injection pen Inject 24 Units into the skin nightly 11/9/21 1/13/22  Wallace Hairston MD   Insulin Syringe-Needle U-100 30G X 5/16\" 1 ML MISC 1 each by Does not apply route daily 11/9/21   Wallace Hairston MD   atorvastatin (LIPITOR) 40 MG tablet Take 1 tablet by mouth nightly 10/19/21   Sarah Jasmine MD   metoprolol tartrate (LOPRESSOR) 50 MG tablet Take 1 tablet by mouth 2 times daily 10/19/21   Sarah Jasmine MD   blood glucose test strips (ONETOUCH VERIO) strip TEST 2 TIMES A DAY & AS NEEDED FOR SYMPTOMS OF IRREGULAR BLOOD GLUCOSE. 5/28/21   Edis Mendoza MD   Alcohol Swabs (B-D SINGLE USE SWABS REGULAR) PADS USE AS DIRECTED WITH TESTING SUPPLIES 5/28/21   Edis Mendoza MD   Lancets ([address]) [facility] USE AS DIRECTED TO TEST BLOOD SUGAR 5/28/21   Edis Mendoza MD   sildenafil (VIAGRA) 100 MG tablet Take 1 tablet by mouth as needed for Erectile Dysfunction 1/7/21   Edis Mendoza MD   Blood Glucose Monitoring Suppl (ONE TOUCH ULTRA 2) w/Device KIT U UTD ONCE D 9/27/19   Historical Provider, MD   acetaminophen (TYLENOL) 500 MG tablet Take 2 tablets by mouth every 8 hours as needed for Pain 9/30/19   Lon Reveles DO   Handicap Placard MISC by Does not apply route For 5 years 9/27/19   Gerald Miller MD   Blood Glucose Monitoring Suppl [facility] BLOOD GLUCOSE METER) w/Device KIT 1 kit by Does not apply route once for 1 dose 9/27/19 9/27/19  Gerald Miller MD   Insulin Pen Needle 32G X 6 MM MISC 1 each by Does not apply route daily 9/27/19   Gerald Miller MD   furosemide (LASIX) 20 MG tablet Take 1 tablet by mouth daily  Patient not taking: Reported on 11/16/2021 7/4/19   ISAAC Robles - CNP   aspirin 81 MG chewable tablet Take 1 tablet by mouth daily 5/10/19   Ami Torres MD   Multiple Vitamins-Minerals (THERAPEUTIC MULTIVITAMIN-MINERALS) tablet Take 1 tablet by mouth daily (with breakfast) 5/10/19   Ami Torres MD       ALLERGIES:     Patient has no known allergies. OBJECTIVE:       Vitals:    01/21/23 2030 01/22/23 0045 01/22/23 0427 01/22/23 0815   BP: 137/66 133/72 (!) 154/79 (!) 147/81   Pulse: 76 84 88 82   Resp: 16 14 16 16   Temp: 98.4 °F (36.9 °C) 98.4 °F (36.9 °C) 98.2 °F (36.8 °C) 98.1 °F (36.7 °C)   TempSrc: Oral Oral Oral Oral   SpO2:  95%     Weight:       Height:             Intake/Output Summary (Last 24 hours) at 1/22/2023 0831  Last data filed at 1/22/2023 1337  Gross per 24 hour   Intake 1200 ml   Output 2625 ml   Net -1425 ml         PHYSICAL EXAM:  General Appearance  Alert , awake , not in acute distress  HEENT - Head is normocephalic, atraumatic. Lungs - Bilateral equal air entry , no wheezes, rales or rhonchi, aeration good  Cardiovascular - Heart sounds are normal.  Regular rhythm, normal rate without murmur, gallop or rub.   Abdomen - Soft, nontender, nondistended, no masses or organomegaly  Neurologic - There are no new focal motor or sensory deficits  Skin - No bruising or bleeding on exposed skin area  Extremities - No cyanosis, clubbing or edema      DIAGNOSTICS:     Laboratory Testing:    Recent Results (from the past 24 hour(s))   POC Glucose Fingerstick    Collection Time: 01/21/23 11:27 AM   Result Value Ref Range    POC Glucose 409 (HH) 75 - 110 mg/dL   POC Glucose Fingerstick    Collection Time: 01/21/23  3:40 PM   Result Value Ref Range    POC Glucose 81 75 - 110 mg/dL   POC Glucose Fingerstick    Collection Time: 01/21/23  8:25 PM   Result Value Ref Range    POC Glucose 254 (H) 75 - 110 mg/dL   CBC with Auto Differential    Collection Time: 01/22/23  6:16 AM   Result Value Ref Range    WBC 9.8 3.5 - 11.3 k/uL    RBC 4.73 4.21 - 5.77 m/uL    Hemoglobin 14.0 13.0 - 17.0 g/dL    Hematocrit 43.0 40.7 - 50.3 %    MCV 90.9 82.6 - 102.9 fL    MCH 29.6 25.2 - 33.5 pg    MCHC 32.6 28.4 - 34.8 g/dL    RDW 13.9 11.8 - 14.4 %    Platelets 157 382 - 966 k/uL    MPV 11.1 8.1 - 13.5 fL    NRBC Automated 0.0 0.0 per 100 WBC    Seg Neutrophils 54 36 - 65 %    Lymphocytes 34 24 - 43 %    Monocytes 8 3 - 12 %    Eosinophils % 3 1 - 4 %    Basophils 1 0 - 2 %    Immature Granulocytes 0 0 %    Segs Absolute 5.36 1.50 - 8.10 k/uL    Absolute Lymph # 3.34 1.10 - 3.70 k/uL    Absolute Mono # 0.74 0.10 - 1.20 k/uL    Absolute Eos # 0.28 0.00 - 0.44 k/uL    Basophils Absolute 0.09 0.00 - 0.20 k/uL    Absolute Immature Granulocyte <0.03 0.00 - 0.30 k/uL   Protime-INR    Collection Time: 01/22/23  6:16 AM   Result Value Ref Range    Protime 22.5 (H) 9.1 - 12.3 sec    INR 2.2    Basic Metabolic Panel    Collection Time: 01/22/23  6:16 AM   Result Value Ref Range    Glucose 124 (H) 70 - 99 mg/dL    BUN 9 6 - 20 mg/dL    Creatinine 0.75 0.70 - 1.20 mg/dL    Est, Glom Filt Rate >60 >60 mL/min/1.73m2    Calcium 9.4 8.6 - 10.4 mg/dL    Sodium 138 135 - 144 mmol/L    Potassium 4.4 3.7 - 5.3 mmol/L    Chloride 102 98 - 107 mmol/L    CO2 26 20 - 31 mmol/L    Anion Gap 10 9 - 17 mmol/L   APTT    Collection Time: 01/22/23  6:16 AM   Result Value Ref Range PTT 62.1 (H) 20.5 - 30.5 sec   POC Glucose Fingerstick    Collection Time: 01/22/23  6:52 AM   Result Value Ref Range    POC Glucose 136 (H) 75 - 110 mg/dL         Imaging/Diagonstics:  EKG: Nainamparo Reagan     CXR:     Current Facility-Administered Medications   Medication Dose Route Frequency Provider Last Rate Last Admin    amLODIPine (NORVASC) tablet 10 mg  10 mg Oral Daily Dragan Blount MD   10 mg at 01/22/23 4544    carvedilol (COREG) tablet 25 mg  25 mg Oral BID  ISAAC Hilton CNP   25 mg at 01/22/23 9436    potassium chloride (KLOR-CON M) extended release tablet 40 mEq  40 mEq Oral PRN Dragan Duque MD   40 mEq at 01/19/23 4926    Or    potassium bicarb-citric acid (EFFER-K) effervescent tablet 40 mEq  40 mEq Oral PRN Dragan Duque MD   40 mEq at 01/20/23 1557    insulin lispro (HUMALOG) injection vial 0-8 Units  0-8 Units SubCUTAneous TID  Nithin Johnson MD   8 Units at 01/21/23 1152    insulin lispro (HUMALOG) injection vial 0-4 Units  0-4 Units SubCUTAneous Nightly Nithin Johnson MD        insulin glargine (LANTUS) injection vial 15 Units  15 Units SubCUTAneous Nightly Nithin Johnson MD   15 Units at 01/21/23 2222    furosemide (LASIX) tablet 40 mg  40 mg Oral Daily HilaryISAAC Bland CNP   40 mg at 01/22/23 0193    warfarin placeholder: dosing by pharmacy   Other ISAAC Hoyos CNP        acetaminophen (TYLENOL) tablet 650 mg  650 mg Oral Q4H PRN Bishop Ciaran MD        famotidine (PEPCID) tablet 20 mg  20 mg Oral BID Addie Hall MD   20 mg at 01/22/23 3847    atorvastatin (LIPITOR) tablet 80 mg  80 mg Oral Nightly Maureen Granger MD   80 mg at 01/21/23 2156    lacosamide (VIMPAT) tablet 100 mg  100 mg Oral BID ISAAC Saleh CNP   100 mg at 01/22/23 2659    levETIRAcetam (KEPPRA) tablet 500 mg  500 mg Oral BID ISAAC Saleh CNP   500 mg at 01/22/23 0822    lisinopril (PRINIVIL;ZESTRIL) tablet 20 mg  20 mg Oral Daily Oswaldo Montelongoo, DO   20 mg at 01/22/23 9095    bisacodyl (DULCOLAX) EC tablet 5 mg  5 mg Oral Daily PRN Pedro Nowak MD        heparin 25,000 units in dextrose 5 % 250 mL infusion (rate based)  5-30 Units/kg/hr IntraVENous Continuous Letty Brewster MD 14.3 mL/hr at 01/22/23 0700 21 Units/kg/hr at 01/22/23 0700    glucose chewable tablet 16 g  4 tablet Oral PRN Kevin Cerna MD        dextrose bolus 10% 125 mL  125 mL IntraVENous PRN Kevin Cerna MD        Or    dextrose bolus 10% 250 mL  250 mL IntraVENous PRN Kevin Cerna MD        glucagon (rDNA) injection 1 mg  1 mg SubCUTAneous PRN Kevin Cerna MD        dextrose 10 % infusion   IntraVENous Continuous PRN Kevin Cerna MD        heparin (porcine) injection 4,000 Units  4,000 Units IntraVENous PRN Letty Brewster MD   4,000 Units at 01/10/23 1538    heparin (porcine) injection 2,000 Units  2,000 Units IntraVENous PRN Letty Brewster MD   2,000 Units at 01/17/23 1211    aspirin chewable tablet 81 mg  81 mg Oral Daily Letty Brewster MD   81 mg at 01/22/23 6326    therapeutic multivitamin-minerals 1 tablet  1 tablet Oral Daily with breakfast Letty Brewster MD   1 tablet at 01/22/23 0822    dextrose bolus 10% 125 mL  125 mL IntraVENous PRN Letty Brewster MD   Stopped at 01/10/23 1139    Or    dextrose bolus 10% 250 mL  250 mL IntraVENous PRN Letty Brewster MD        potassium chloride 10 mEq/100 mL IVPB (Peripheral Line)  10 mEq IntraVENous PRN Letty Brewster  mL/hr at 01/16/23 1142 10 mEq at 01/16/23 1142    magnesium sulfate 1000 mg in dextrose 5% 100 mL IVPB  1,000 mg IntraVENous PRN Letty Brewster MD   Stopped at 01/20/23 1224    sodium phosphate 10 mmol in sodium chloride 0.9 % 250 mL IVPB  10 mmol IntraVENous PRN Letty Brewster MD   Stopped at 01/10/23 1327    Or    sodium phosphate 15 mmol in dextrose 5 % 250 mL IVPB  15 mmol IntraVENous PRN Letty Brewster MD   Stopped at 01/08/23 1315    Or    sodium phosphate 20 mmol in dextrose 5 % 500 mL IVPB  20 mmol IntraVENous PRN Melissa Jon MD        sodium chloride flush 0.9 % injection 5-40 mL  5-40 mL IntraVENous 2 times per day Melissa Jno MD   10 mL at 01/22/23 3761    sodium chloride flush 0.9 % injection 5-40 mL  5-40 mL IntraVENous PRN Melissa Jon MD        0.9 % sodium chloride infusion   IntraVENous PRN Melissa Jon MD   Stopped at 01/14/23 1603    ondansetron (ZOFRAN-ODT) disintegrating tablet 4 mg  4 mg Oral Q8H PRN Melissa Jon MD        Or    ondansetron (ZOFRAN) injection 4 mg  4 mg IntraVENous Q6H PRN Melissa Jon MD        polyethylene glycol (GLYCOLAX) packet 17 g  17 g Oral Daily PRN Melissa Jon MD        acetaminophen (TYLENOL) tablet 650 mg  650 mg Oral Q6H PRROMELIA Jon MD        Or    acetaminophen (TYLENOL) suppository 650 mg  650 mg Rectal Q6H PRN Melissa Jon MD           ASSESSMENT:     Principal Problem:    Altered mental status  Active Problems:    DKA, type 2, not at goal [facility])    Stenosis of right middle cerebral artery    At risk of seizures    Cardiomyopathy, ischemic    Acute ischemic right MCA stroke ([facility] [address]    Presence of internal carotid stent    Acute metabolic encephalopathy    Cerebral multi-infarct state  Resolved Problems:    * No resolved hospital problems. *      PLAN:     INR came back in therapeutic range, ready for discharge today, cleared from cardiology standpoint, given 8 units lispro due to glucose 409, this morning glucose normalized at 124.     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   -Aspirin 81 mg daily and Lipitor 80 mg nightly  -Endovascular on board:              -no intervention, CTA is stable and L ICA and L VA occlusion are chronic and known before 2019              -F/u neuro endovascular clinic in 6 months   -Continue patient on heparin, discharge from endovascular standpoint              -Patient status post subclavian artery stent, recovering well, continue anticoagulation   -on aspirin 81        Left ventricle apical thrombus 1.4 cm X0 0.6 cm, LVEF 35-40%, MV-CAD s/p PEA arrest, s/p CABG 2019  -ECHO 1/9: Left ventricle apical thrombus- On Heparin Gtt              -Will need to be transition to [facility] at discharge  -Cardiology on board:              -S/p cardiac cath 1/13              -Continue heparin for LV thrombus, IV Lasix 40 mg twice daily, Coreg 12.5 mg twice daily, aspirin 81 mg daily, statin 40 mg nightly, lisinopril 20 mg daily  -Potassium came back 4.4 today                   -Warfarin was bridged with heparin, INR in therapeutic range today-2.2     -Cardiac cath done 1/13 showed multivessel CAD              -OM1 has high origin with ostial 90% stenosis              -LCx: Small caliber vessel. Proximal and distal 70% stenosis              -99% stenosis at proximal left subclavian artery at origin of LIMA, leading to its underfilling. Systolic CHF  -EF 22-69%, moderate reduction in LV systolic function  -Continue IV lasix 40 mg BID, Coreg 12.5 mg BID, lisinopril 20 mg QD  -Cardiology on board-want to continue ACE inhibitor's, beta-blockers and IV diuresis with close monitoring of renal function.   And continue aspirin and statin        T2DM  -Medium dose sliding scale  -Hypoglycemia protocol in place  -Patient had a hypoglycemic episode last night, glucose 409,  -Lantus to 15 units   -Given 8 units lispro last night     AMS   -Neurology on board:              -No driving for 6 months              -Continue Keppra and Vimpat on discharge              -Signed off  -MRI brain showed tiny acute infarct   -LTME no seizure activty  -Continue ASA 81 mg QD, Lipitor 80 mg nightly  -Continue IV Keppra 500 mg BID and Vimpat 100 mg BID        Hypertension  -On coreg and Lisinopril  -Norvasc increased to 10mg  -Hydrazine PRN         Gi Ppx: IV Pepcid 20 mg twice daily  DVT Ppx: on heparin and warfarin          Plan will be discussed with the attending, Dr. Fito Montalvo MD  Family Medicine Resident  1/22/2023 8:31 AM            Please note that this chart was generated using voice recognition Dragon dictation software.   Although every effort was made to ensure the accuracy of this automated transcription, some errors in transcription may have occurred

## 2023-01-22 NOTE — PROGRESS NOTES
Pharmacy Note  Warfarin Consult follow-up    Consulting physician: Margie Powell  Reason for Admission: altered mental status  Warfarin dose prior to admission: none   Warfarin indication: LV thrombus   Target INR range: 2-3    Recent Labs     01/22/23  0616   INR 2.2     Recent Labs     01/20/23  0656 01/21/23  0341 01/22/23  0616   HGB 12.6* 13.0 14.0   HCT 39.8* 40.5* 43.0    373 388       Significant Drug-Drug Interactions:  New warfarin drug-drug interactions: none  Discontinued drug-drug interactions: none      Notes:    Date INR Dose   1/17 1.1 5   1/18 1.1 7.5   1/19 1.3 7.5   1/20 1.8 7.5   1/21 1.9 7.5   1/22 2.2           INR is therapeutic. Will dose 7.5 mg x 1 today. Daily PT/INR while inpatient. Thank you for the consult.    Carol Olivares, PharmD   1/22/2023 1:14 PM

## 2023-01-23 NOTE — PROGRESS NOTES
CLINICAL PHARMACY NOTE: MEDS TO BEDS    Total # of Prescriptions Filled: 6   The following medications were delivered to the patient:  Coreg 25mg  Norvasc 10mg  Lisinopril 20mg  Lipitor 80mg  Lasix 40mg  Warfarin 5mg    Additional Documentation: meds delivered to the pt in room 2010 on 01.22.23 at 15:14 no co pay

## 2023-01-24 NOTE — DISCHARGE SUMMARY
Department of [address] [facility]    Discharge Summary      NAME:  Hung Almaguer  :  1965  MRN:  [id number]    Admit date:  2023  Discharge date:  2023    Admitting Physician:  Lupe Riojas DO    Primary Diagnosis on Admission:   Present on Admission:   Altered mental status   DKA, type 2, not at goal [facility])   Stenosis of right middle cerebral artery   At risk of seizures   Cardiomyopathy, ischemic   Acute metabolic encephalopathy   Acute ischemic right MCA stroke ([facility] [address]   Cerebral multi-infarct state   Presence of internal carotid stent      Secondary Diagnoses:  does not have any pertinent problems on file. Admission Condition:  poor     Discharged Condition: good    Hospital Course:      59-year-old male with past medical history of hypertension, multivessel CAD, presented to the ED with hypertension, elevated glucose and concerns for DKA, and EKG showed a STEMI. He was then admitted to ICU. he was seen by cardiology which recommended heparin drip. CTA of the head showed severe stenosis in the left subclavian artery, which he underwent stenting, and patient also received a nitro drip for hypertension. The patient was then transferred to our service from ICU    And endovascular surgery were consulted and he had no intervention for that    Cardiac cath was also done  which showed multivessel CAD, and critical stenosis in the left subclavian artery    Neurology consulted for AMS and conducted MRI, which showed tiny infarcts in the right temporal region but did not think it caused encephalopathy. Ultimately was done which did not show any seizures. Patient then regained mentation over the next few days and was back to baseline on discharge,mentally. Patient was then continued on aspirin 81  Cardiology found a thrombus measuring 1.4 cm time 0.6 cm in the left ventricle, so I put him on warfarin.     Warfarin was bridged with heparin, and INR became therapeutic range before discharge    Consults:  cardiology, and vascular surgery, vascular surgery    Significant Diagnostic/theraputic interventions: Cardiac cath, subclavian stenting, warfarin therapy      Disposition:   home    Instructions to Patient: Take all your medications as advised, present back to the hospital if you feel any symptoms that are concerning     Follow up with Benny Coulter MD in  1-2 weeks     Discharge Medications:       Medication List        START taking these medications      amLODIPine 10 MG tablet  Commonly known as: NORVASC  Take 1 tablet by mouth daily     carvedilol 25 MG tablet  Commonly known as: COREG  Take 1 tablet by mouth 2 times daily (with meals)     levETIRAcetam 500 MG tablet  Commonly known as: KEPPRA  Take 1 tablet by mouth 2 times daily     lisinopril 20 MG tablet  Commonly known as: PRINIVIL;ZESTRIL  Take 1 tablet by mouth daily     warfarin 5 MG tablet  Commonly known as: Coumadin  Take 1 tablet by mouth daily            CHANGE how you take these medications      atorvastatin 80 MG tablet  Commonly known as: LIPITOR  Take 1 tablet by mouth nightly  What changed:   medication strength  how much to take     furosemide 40 MG tablet  Commonly known as: LASIX  Take 1 tablet by mouth daily  What changed:   medication strength  how much to take            CONTINUE taking these medications      acetaminophen 500 MG tablet  Commonly known as: TYLENOL  Take 2 tablets by mouth every 8 hours as needed for Pain     * Acura Blood Glucose Meter w/Device Kit  1 kit by Does not apply route once for 1 dose     * ONE TOUCH ULTRA 2 w/Device Kit     aspirin 81 MG chewable tablet  Take 1 tablet by mouth daily     B-D SINGLE USE SWABS REGULAR Pads  USE AS DIRECTED WITH TESTING SUPPLIES     clopidogrel 75 MG tablet  Commonly known as: PLAVIX  TAKE 1 TABLET BY MOUTH DAILY     gabapentin 300 MG capsule  Commonly known as: NEURONTIN  TAKE 1 CAPSULE BY MOUTH EVERY 8 HOURS   Handicap Placard Misc  by Does not apply route For 5 years     Insulin Pen Needle 32G X 6 MM Misc  1 each by Does not apply route daily     Insulin Syringe-Needle U-100 30G X 5/16\" 1 ML Misc  1 each by Does not apply route daily     Lantus SoloStar 100 UNIT/ML injection pen  Generic drug: insulin glargine  Inject 24 Units into the skin nightly     metFORMIN 1000 MG tablet  Commonly known as: GLUCOPHAGE  Take 1 tablet by mouth 2 times daily (with meals)     OneTouch Delica Plus [id number] Misc  USE AS DIRECTED TO TEST BLOOD SUGAR     OneTouch Verio strip  Generic drug: blood glucose test strips  TEST 2 TIMES A DAY & AS NEEDED FOR SYMPTOMS OF IRREGULAR BLOOD GLUCOSE.     therapeutic multivitamin-minerals tablet  Take 1 tablet by mouth daily (with breakfast)           * This list has 2 medication(s) that are the same as other medications prescribed for you. Read the directions carefully, and ask your doctor or other care provider to review them with you.                STOP taking these medications      lisinopril-hydroCHLOROthiazide 20-25 MG per tablet  Commonly known as: PRINZIDE;ZESTORETIC     metoprolol tartrate 25 MG tablet  Commonly known as: LOPRESSOR     metoprolol tartrate 50 MG tablet  Commonly known as: LOPRESSOR     sildenafil 100 MG tablet  Commonly known as: Viagra            ASK your doctor about these medications      insulin lispro (1 Unit Dial) 100 UNIT/ML Sopn  Commonly known as: HumaLOG KwikPen  INJECT SUBCUTANEOUSLY BEFORE MEALS AS DIRECTED PER SLIDING SCALE: SEE ATTACHED FOR SLIDING SCALE DIRECTIONS: Hold all insulin and notify Dr immediately if BG <60. -200: 2 UNITS; -250: 4UNITS; 251-300 6 UNITS; 301-350: 8 UNITS               Where to Get Your Medications        These medications were sent to [address] [phone] - F [phone]  [address] [facility] [address]      Phone: [phone]   levETIRAcetam 500 MG tablet       These  medications were sent to [facility] [address] [facility], [address] [facility]  [address], [address] [place] [address]      Phone: [phone]   amLODIPine 10 MG tablet  atorvastatin 80 MG tablet  carvedilol 25 MG tablet  furosemide 40 MG tablet  lisinopril 20 MG tablet  warfarin 5 MG tablet         Send Copies to: Genaro Orellana MD,       Note that over 30 minutes was spent in preparing discharge papers, discussing discharge with patient and family, medication review, etc.      Jose Alejandro Baker MD  Family Medicine Resident  Family Medicine Inpatient Service  1/24/2023 10:24 AM          Please note that this chart was generated using voice recognition Dragon dictation software.   Although every effort was made to ensure the accuracy of this automated transcription, some errors in transcription may have occurred

## 2023-01-26 ENCOUNTER — HOSPITAL ENCOUNTER (OUTPATIENT)
Dept: PHARMACY | Age: 58
Setting detail: THERAPIES SERIES
Discharge: HOME | End: 2023-01-26
Payer: MEDICAID

## 2023-01-26 ENCOUNTER — TELEPHONE (OUTPATIENT)
Dept: FAMILY MEDICINE CLINIC | Age: 58
End: 2023-01-26

## 2023-01-26 DIAGNOSIS — I25.10 CORONARY ARTERY DISEASE INVOLVING NATIVE HEART WITHOUT ANGINA PECTORIS, UNSPECIFIED VESSEL OR LESION TYPE: ICD-10-CM

## 2023-01-26 DIAGNOSIS — I65.23 CAROTID STENOSIS, BILATERAL: Primary | ICD-10-CM

## 2023-01-26 LAB
INR BLD: 3.5
PROTIME: 41.4 SECONDS

## 2023-01-26 PROCEDURE — 99213 OFFICE O/P EST LOW 20 MIN: CPT

## 2023-01-26 PROCEDURE — 85610 PROTHROMBIN TIME: CPT

## 2023-01-26 NOTE — TELEPHONE ENCOUNTER
Danya Noy from [facility] stated pt was referred for skill services which they only do the PT/OT. Wanted to give updated.

## 2023-01-26 NOTE — PROGRESS NOTES
Medication Management Service, Warfarin Management  ELZA BENTON [facility], [phone]  First visit to ACS Office. Date: 1/26/2023     Education provided on indication and mechanism of warfarin, compliance, appropriate follow-up & monitoring, dietary and medication interactions, potential thromboembolic & bleeding complications, when to seek medical care, and office policy. Patient has never been on warfarin in the past.    Patient states compliant with regimen. No bleeding or thromboembolic side effects noted. No significant med or dietary changes. No significant recent illness or disease state changes. Subjective:   Dexter Robles is a 61 y.o. male who presents to clinic today for anticoagulation monitoring and adjustment. Patient seen in clinic for warfarin management due to  Indication:    internal carotid stent . INR goal: of 2.0-3.0. Duration of therapy: 6 months, until 7/2023. Assessment and PLAN   PT/INR done in office per protocol. INR today is 3.5, supratherapeutic. Elevated INR due to regimen being to high. Maintenance regimen is not yet established for this patient. Plan: Will reduce dose from warfarin 7.5 mg to 5 mg for today only. Then reduce maintenance regimen to warfarin 5 mg Mon, Wed, Fri; 7.5 mg all other days of the week. of the week. Using warfarin 5 mg tablets. Recheck INR in 1 week(s). Patient seen in room # 2. CPA and HIPAA forms completed today. ED. OP. = Patient reports 6 servings of vegetables each week. Usually they include green beans, spinach, and corn. Explained that corn does not count as a vegetable and that spinach is much higher in vitamin K content than green beans. Emphasized consistency with his diet. Patient verbalized understanding of dosing directions and information discussed. Dosing schedule given to patient. Progress note sent to referring office.   Patient acknowledges working in consult agreement with pharmacist as referred by his/her physician. [address] [place] [address]  Ph., CACP, Clinical Pharmacist  Anticoagulation Services, [facility]  2023  9:59 AM      For Pharmacy Admin Tracking Only    Intervention Detail: Adherence Monitorin and Dose Adjustment: 2, reason: Therapy De-escalation  Total # of Interventions Recommended: 3  Total # of Interventions Accepted: 3  Time Spent (min): 45

## 2023-01-27 ENCOUNTER — OFFICE VISIT (OUTPATIENT)
Dept: FAMILY MEDICINE CLINIC | Age: 58
End: 2023-01-27
Payer: MEDICAID

## 2023-01-27 ENCOUNTER — HOSPITAL ENCOUNTER (OUTPATIENT)
Age: 58
Setting detail: SPECIMEN
Discharge: HOME | End: 2023-01-27

## 2023-01-27 VITALS
WEIGHT: 148.4 LBS | BODY MASS INDEX: 24.72 KG/M2 | DIASTOLIC BLOOD PRESSURE: 60 MMHG | TEMPERATURE: 97 F | HEIGHT: 65 IN | HEART RATE: 75 BPM | SYSTOLIC BLOOD PRESSURE: 122 MMHG

## 2023-01-27 DIAGNOSIS — Z09 HOSPITAL DISCHARGE FOLLOW-UP: ICD-10-CM

## 2023-01-27 DIAGNOSIS — I66.01 STENOSIS OF RIGHT MIDDLE CEREBRAL ARTERY: ICD-10-CM

## 2023-01-27 DIAGNOSIS — I65.23 CAROTID STENOSIS, BILATERAL: ICD-10-CM

## 2023-01-27 DIAGNOSIS — I50.22 CHRONIC SYSTOLIC CONGESTIVE HEART FAILURE (HCC): ICD-10-CM

## 2023-01-27 DIAGNOSIS — I25.10 CORONARY ARTERY DISEASE INVOLVING NATIVE HEART WITHOUT ANGINA PECTORIS, UNSPECIFIED VESSEL OR LESION TYPE: ICD-10-CM

## 2023-01-27 DIAGNOSIS — I51.3 LV (LEFT VENTRICULAR) MURAL THROMBUS: ICD-10-CM

## 2023-01-27 DIAGNOSIS — E11.40 TYPE 2 DIABETES MELLITUS WITH DIABETIC NEUROPATHY, WITHOUT LONG-TERM CURRENT USE OF INSULIN (HCC): ICD-10-CM

## 2023-01-27 DIAGNOSIS — I69.30 CEREBRAL MULTI-INFARCT STATE: ICD-10-CM

## 2023-01-27 DIAGNOSIS — I73.9 PAD (PERIPHERAL ARTERY DISEASE) (HCC): ICD-10-CM

## 2023-01-27 DIAGNOSIS — I25.5 CARDIOMYOPATHY, ISCHEMIC: Primary | ICD-10-CM

## 2023-01-27 LAB
CREATININE URINE: 126 MG/DL (ref 39–259)
MICROALBUMIN/CREAT 24H UR: 277 MG/L
MICROALBUMIN/CREAT UR-RTO: 220 MCG/MG CREAT

## 2023-01-27 PROCEDURE — G8427 DOCREV CUR MEDS BY ELIG CLIN: HCPCS | Performed by: STUDENT IN AN ORGANIZED HEALTH CARE EDUCATION/TRAINING PROGRAM

## 2023-01-27 PROCEDURE — 1111F DSCHRG MED/CURRENT MED MERGE: CPT | Performed by: STUDENT IN AN ORGANIZED HEALTH CARE EDUCATION/TRAINING PROGRAM

## 2023-01-27 PROCEDURE — 3078F DIAST BP <80 MM HG: CPT | Performed by: STUDENT IN AN ORGANIZED HEALTH CARE EDUCATION/TRAINING PROGRAM

## 2023-01-27 PROCEDURE — G0008 ADMIN INFLUENZA VIRUS VAC: HCPCS | Performed by: FAMILY MEDICINE

## 2023-01-27 PROCEDURE — 3046F HEMOGLOBIN A1C LEVEL >9.0%: CPT | Performed by: STUDENT IN AN ORGANIZED HEALTH CARE EDUCATION/TRAINING PROGRAM

## 2023-01-27 PROCEDURE — 90746 HEPB VACCINE 3 DOSE ADULT IM: CPT | Performed by: FAMILY MEDICINE

## 2023-01-27 PROCEDURE — G8420 CALC BMI NORM PARAMETERS: HCPCS | Performed by: STUDENT IN AN ORGANIZED HEALTH CARE EDUCATION/TRAINING PROGRAM

## 2023-01-27 PROCEDURE — 99211 OFF/OP EST MAY X REQ PHY/QHP: CPT | Performed by: FAMILY MEDICINE

## 2023-01-27 PROCEDURE — 3074F SYST BP LT 130 MM HG: CPT | Performed by: STUDENT IN AN ORGANIZED HEALTH CARE EDUCATION/TRAINING PROGRAM

## 2023-01-27 PROCEDURE — 1036F TOBACCO NON-USER: CPT | Performed by: STUDENT IN AN ORGANIZED HEALTH CARE EDUCATION/TRAINING PROGRAM

## 2023-01-27 PROCEDURE — 2022F DILAT RTA XM EVC RTNOPTHY: CPT | Performed by: STUDENT IN AN ORGANIZED HEALTH CARE EDUCATION/TRAINING PROGRAM

## 2023-01-27 PROCEDURE — 99213 OFFICE O/P EST LOW 20 MIN: CPT | Performed by: STUDENT IN AN ORGANIZED HEALTH CARE EDUCATION/TRAINING PROGRAM

## 2023-01-27 PROCEDURE — G8482 FLU IMMUNIZE ORDER/ADMIN: HCPCS | Performed by: STUDENT IN AN ORGANIZED HEALTH CARE EDUCATION/TRAINING PROGRAM

## 2023-01-27 PROCEDURE — 3017F COLORECTAL CA SCREEN DOC REV: CPT | Performed by: STUDENT IN AN ORGANIZED HEALTH CARE EDUCATION/TRAINING PROGRAM

## 2023-01-27 RX ORDER — INSULIN LISPRO 100 [IU]/ML
INJECTION, SOLUTION INTRAVENOUS; SUBCUTANEOUS
Status: CANCELLED | OUTPATIENT
Start: 2023-01-27

## 2023-01-27 RX ORDER — BLOOD SUGAR DIAGNOSTIC
STRIP MISCELLANEOUS
Qty: 100 STRIP | Refills: 3 | Status: SHIPPED | OUTPATIENT
Start: 2023-01-27

## 2023-01-27 RX ORDER — INSULIN GLARGINE 100 [IU]/ML
24 INJECTION, SOLUTION SUBCUTANEOUS NIGHTLY
Qty: 7.2 ML | Refills: 0 | Status: SHIPPED | OUTPATIENT
Start: 2023-01-27 | End: 2023-02-26

## 2023-01-27 RX ORDER — ASPIRIN 81 MG/1
81 TABLET, CHEWABLE ORAL DAILY
Qty: 90 TABLET | Refills: 3 | Status: SHIPPED | OUTPATIENT
Start: 2023-01-27

## 2023-01-27 RX ORDER — ACETAMINOPHEN 500 MG
1000 TABLET ORAL EVERY 8 HOURS PRN
Qty: 120 TABLET | Refills: 3 | Status: SHIPPED | OUTPATIENT
Start: 2023-01-27

## 2023-01-27 SDOH — ECONOMIC STABILITY: FOOD INSECURITY: WITHIN THE PAST 12 MONTHS, THE FOOD YOU BOUGHT JUST DIDN'T LAST AND YOU DIDN'T HAVE MONEY TO GET MORE.: NEVER TRUE

## 2023-01-27 SDOH — ECONOMIC STABILITY: FOOD INSECURITY: WITHIN THE PAST 12 MONTHS, YOU WORRIED THAT YOUR FOOD WOULD RUN OUT BEFORE YOU GOT MONEY TO BUY MORE.: NEVER TRUE

## 2023-01-27 ASSESSMENT — PATIENT HEALTH QUESTIONNAIRE - PHQ9
SUM OF ALL RESPONSES TO PHQ QUESTIONS 1-9: 2
SUM OF ALL RESPONSES TO PHQ QUESTIONS 1-9: 2
SUM OF ALL RESPONSES TO PHQ9 QUESTIONS 1 & 2: 2
2. FEELING DOWN, DEPRESSED OR HOPELESS: 0
SUM OF ALL RESPONSES TO PHQ QUESTIONS 1-9: 2
SUM OF ALL RESPONSES TO PHQ QUESTIONS 1-9: 2
1. LITTLE INTEREST OR PLEASURE IN DOING THINGS: 2

## 2023-01-27 ASSESSMENT — SOCIAL DETERMINANTS OF HEALTH (SDOH): HOW HARD IS IT FOR YOU TO PAY FOR THE VERY BASICS LIKE FOOD, HOUSING, MEDICAL CARE, AND HEATING?: NOT HARD AT ALL

## 2023-01-27 NOTE — PROGRESS NOTES
Post-Discharge Transitional Care  Follow Up      Mendez Shepherd   YOB: 1965    Date of Office Visit:  1/27/2023  Date of Hospital Admission: 1/7/23  Date of Hospital Discharge: 1/22/23  Risk of hospital readmission (high >=14%. Medium >=10%) :Readmission Risk Score: 13.1      Care management risk score Rising risk (score 2-5) and Complex Care (Scores >=6): No Risk Score On File     Non face to face  following discharge, date last encounter closed (first attempt may have been earlier): *No documented post hospital discharge outreach found in the last 14 days    Call initiated 2 business days of discharge: *No response recorded in the last 14 days    ASSESSMENT/PLAN:   Cardiomyopathy, ischemic  Type 2 diabetes mellitus with diabetic neuropathy, without long-term current use of insulin ([facility])  -     Microalbumin, Ur; Future  -     insulin glargine (LANTUS SOLOSTAR) 100 UNIT/ML injection pen; Inject 24 Units into the skin nightly, Disp-7.2 mL, R-0Normal  -     Insulin Pen Needle 32G X 6 MM MISC; DAILY Starting Fri 1/27/2023, Disp-100 each, R-3, Normal  -     blood glucose test strips (ONETOUCH VERIO) strip; TEST 2 TIMES A DAY & AS NEEDED FOR SYMPTOMS OF IRREGULAR BLOOD GLUCOSE., Disp-100 strip, R-3Normal  -     metFORMIN (GLUCOPHAGE) 1000 MG tablet; Take 1 tablet by mouth 2 times daily (with meals), Disp-60 tablet, R-5Normal  -     dapagliflozin (FARXIGA) 10 MG tablet; Take 1 tablet by mouth every morning, Disp-30 tablet, R-5Normal  Stenosis of right middle cerebral artery  PAD (peripheral artery disease) ([facility])  Carotid stenosis, bilateral  Coronary artery disease involving native heart without angina pectoris, unspecified vessel or lesion type  -     aspirin 81 MG chewable tablet; Take 1 tablet by mouth daily, Disp-90 tablet, R-3Normal  Cerebral multi-infarct state  -     aspirin 81 MG chewable tablet;  Take 1 tablet by mouth daily, Disp-90 tablet, R-3Normal  LV (left ventricular) mural thrombus  Chronic systolic congestive heart failure (HCC)  -     dapagliflozin (FARXIGA) 10 MG tablet; Take 1 tablet by mouth every morning, Disp-30 tablet, R-5Normal  Hospital discharge follow-up  -     AR DISCHARGE MEDS RECONCILED W/ CURRENT OUTPATIENT MED LIST    Recently admitted to [facility] for STEMI. Underwent cardiac cath which shows multivessel CAD. Patient has history of CABG in the past.  Cath also reveals critical stenosis of left subclavian artery. Medical therapy was recommended. Vascular surgery was consulted for subclavian artery stenting but they advised optimal medical therapy. Patient also found to have left ventricle apical thrombus. Patient currently noted to be on Coumadin. Also he is on dual antiplatelets for significant history is of CAD and CVA. Plan:  Continue DAPT  Continue Coumadin for left ventricular apical thrombus  Continue Lantus 24 units. Start metformin and [place]. Discontinue sliding scale. Check A1c in 1 month. Discussed risks and benefits of dual antiplatelet therapy and Coumadin. Educated him on fall risk. Precautionary measures discussed. Medical Decision Making: moderate complexity  Return in 1 month (on 2/27/2023). On this date 1/27/2023 I have spent 35 minutes reviewing previous notes, test results and face to face with the patient discussing the diagnosis and importance of compliance with the treatment plan as well as documenting on the day of the visit. Subjective:   HPI:  Follow up of Hospital problems/diagnosis(es):     Inpatient course: Discharge summary reviewed- see chart. Interval history/Current status:     Doing better  Denies weakness, numbness, chest pain, shortness of breath, palpitations.   Diabetic medications adjusted    Patient Active Problem List   Diagnosis    PAD (peripheral artery disease) (HCC)    Subacute osteomyelitis of right tibia (HCC)    Acute metabolic encephalopathy    Abnormal CT of the head    Hepatitis    Cerebral multi-infarct state    Multi infarct state    Carotid stenosis, bilateral    Right foot drop    Thrombocytosis    Acute CVA (cerebrovascular accident) ([facility] [address]    Moderate malnutrition ([address]    Cellulitis of right lower extremity    Right-sided extracranial carotid artery stenosis    Essential hypertension    Type 2 diabetes mellitus with diabetic neuropathy, without long-term current use of insulin (HCC)    Leukocytosis    Status post angiography of extremity    Ischemic gangrene ([address]    Pyogenic inflammation of bone (HCC)    Coronary artery disease    Above knee amputation of right lower extremity (HCC)    Pressure injury of right heel, unstageable (HCC)    Hx of AKA (above knee amputation), right ([address]    Erectile dysfunction associated with type 2 diabetes mellitus ([address]    Altered mental status    DKA, type 2, not at goal [facility])    Stenosis of right middle cerebral artery    At risk of seizures    Cardiomyopathy, ischemic    Acute ischemic right MCA stroke ([address]    Presence of internal carotid stent    LV (left ventricular) mural thrombus    Chronic systolic congestive heart failure ([address]       Medications listed as ordered at the time of discharge from hospital     Medication List            Accurate as of January 27, 2023  5:36 PM. If you have any questions, ask your nurse or doctor.                 START taking these medications      dapagliflozin 10 MG tablet  Commonly known as: Farxiga  Take 1 tablet by mouth every morning  Started by: Veria Duane, MD            CONTINUE taking these medications      acetaminophen 500 MG tablet  Commonly known as: TYLENOL  Take 2 tablets by mouth every 8 hours as needed for Pain     * Acura Blood Glucose Meter w/Device Kit  1 kit by Does not apply route once for 1 dose     * ONE TOUCH ULTRA 2 w/Device Kit     amLODIPine 10 MG tablet  Commonly known as: NORVASC  Take 1 tablet by mouth daily     aspirin 81 MG chewable tablet  Take 1 tablet by mouth daily     atorvastatin 80 MG tablet  Commonly known as: LIPITOR  Take 1 tablet by mouth nightly     B-D SINGLE USE SWABS REGULAR Pads  USE AS DIRECTED WITH TESTING SUPPLIES     carvedilol 25 MG tablet  Commonly known as: COREG  Take 1 tablet by mouth 2 times daily (with meals)     clopidogrel 75 MG tablet  Commonly known as: PLAVIX  TAKE 1 TABLET BY MOUTH DAILY     furosemide 40 MG tablet  Commonly known as: LASIX  Take 1 tablet by mouth daily     gabapentin 300 MG capsule  Commonly known as: NEURONTIN  TAKE 1 CAPSULE BY MOUTH EVERY 8 HOURS     Handicap Placard Misc  by Does not apply route For 5 years     Insulin Pen Needle 32G X 6 MM Misc  1 each by Does not apply route daily     Insulin Syringe-Needle U-100 30G X 5/16\" 1 ML Misc  1 each by Does not apply route daily     Lantus SoloStar 100 UNIT/ML injection pen  Generic drug: insulin glargine  Inject 24 Units into the skin nightly     levETIRAcetam 500 MG tablet  Commonly known as: KEPPRA  Take 1 tablet by mouth 2 times daily     lisinopril 20 MG tablet  Commonly known as: PRINIVIL;ZESTRIL  Take 1 tablet by mouth daily     metFORMIN 1000 MG tablet  Commonly known as: GLUCOPHAGE  Take 1 tablet by mouth 2 times daily (with meals)     OneTouch Delica Plus [id number] Misc  USE AS DIRECTED TO TEST BLOOD SUGAR     OneTouch Verio strip  Generic drug: blood glucose test strips  TEST 2 TIMES A DAY & AS NEEDED FOR SYMPTOMS OF IRREGULAR BLOOD GLUCOSE.     therapeutic multivitamin-minerals tablet  Take 1 tablet by mouth daily (with breakfast)     warfarin 5 MG tablet  Commonly known as: Coumadin  Take as directed by the anticoagulation clinic. If you are unsure how to take this medication, talk to your nurse or doctor. Original instructions: Take 1 tablet by mouth daily           * This list has 2 medication(s) that are the same as other medications prescribed for you. Read the directions carefully, and ask your doctor or other care provider to review them with you.                 STOP taking these medications      insulin lispro (1 Unit Dial) 100 UNIT/ML Sopn  Commonly known as: HumaLOG KwikPen  Stopped by: Enid Rand MD               Where to Get Your Medications        These medications were sent to [address] [place] [address], [address] [place] [address]      Phone: [phone]   acetaminophen 500 MG tablet  aspirin 81 MG chewable tablet  dapagliflozin 10 MG tablet  Insulin Pen Needle 32G X 6 MM Misc  Lantus SoloStar 100 UNIT/ML injection pen  metFORMIN 1000 MG tablet  OneTouch Verio strip           Medications marked \"taking\" at this time  Outpatient Medications Marked as Taking for the 1/27/23 encounter (Office Visit) with Enid Rand MD   Medication Sig Dispense Refill    acetaminophen (TYLENOL) 500 MG tablet Take 2 tablets by mouth every 8 hours as needed for Pain 120 tablet 3    aspirin 81 MG chewable tablet Take 1 tablet by mouth daily 90 tablet 3    insulin glargine (LANTUS SOLOSTAR) 100 UNIT/ML injection pen Inject 24 Units into the skin nightly 7.2 mL 0    Insulin Pen Needle 32G X 6 MM MISC 1 each by Does not apply route daily 100 each 3    blood glucose test strips (ONETOUCH VERIO) strip TEST 2 TIMES A DAY & AS NEEDED FOR SYMPTOMS OF IRREGULAR BLOOD GLUCOSE. 100 strip 3    metFORMIN (GLUCOPHAGE) 1000 MG tablet Take 1 tablet by mouth 2 times daily (with meals) 60 tablet 5    dapagliflozin (FARXIGA) 10 MG tablet Take 1 tablet by mouth every morning 30 tablet 5    atorvastatin (LIPITOR) 80 MG tablet Take 1 tablet by mouth nightly 30 tablet 3    lisinopril (PRINIVIL;ZESTRIL) 20 MG tablet Take 1 tablet by mouth daily 30 tablet 3    carvedilol (COREG) 25 MG tablet Take 1 tablet by mouth 2 times daily (with meals) 60 tablet 3    amLODIPine (NORVASC) 10 MG tablet Take 1 tablet by mouth daily 30 tablet 3    furosemide (LASIX) 40 MG tablet Take 1 tablet by mouth daily 60 tablet 3    warfarin (COUMADIN) 5 MG tablet Take 1 tablet by mouth daily 30 tablet 1    levETIRAcetam (KEPPRA) 500 MG tablet Take 1 tablet by mouth 2 times daily 60 tablet 3    clopidogrel (PLAVIX) 75 MG tablet TAKE 1 TABLET BY MOUTH DAILY 30 tablet 0    Insulin Syringe-Needle U-100 30G X 5/16\" 1 ML MISC 1 each by Does not apply route daily 100 each 3    Alcohol Swabs (B-D SINGLE USE SWABS REGULAR) PADS USE AS DIRECTED WITH TESTING SUPPLIES 100 each 3    Lancets (ONETOUCH DELICA PLUS [id number]) MISC USE AS DIRECTED TO TEST BLOOD SUGAR 100 each 3    Blood Glucose Monitoring Suppl (ONE TOUCH ULTRA 2) w/Device KIT U UTD ONCE D  0    Handicap Placard MISC by Does not apply route For 5 years 1 each 0    Multiple Vitamins-Minerals (THERAPEUTIC MULTIVITAMIN-MINERALS) tablet Take 1 tablet by mouth daily (with breakfast)          Medications patient taking as of now reconciled against medications ordered at time of hospital discharge: Yes    A comprehensive review of systems was negative except for what was noted in the HPI. Objective:    /60 (Site: Right Upper Arm, Position: Sitting, Cuff Size: Medium Adult)   Pulse 75   Temp 97 °F (36.1 °C) (Oral)   Ht 5' 5\" (1.651 m)   Wt 148 lb 6.4 oz (67.3 kg)   BMI 24.70 kg/m²     General Appearance: alert and oriented to person, place and time. Sitting in wheel walker. Pulmonary/Chest: clear to auscultation bilaterally- no wheezes, rales or rhonchi. Cardiovascular: normal rate, normal S1 and S2, carotid murmur heard on both sides  Extremities: no cyanosis and no clubbing  Musculoskeletal: normal range of motion, no joint swelling, deformity or tenderness  Neurologic: No new weakness, numbness or droop. An electronic signature was used to authenticate this note.   --Martin Burroughs MD

## 2023-01-27 NOTE — PROGRESS NOTES
Visit Information    Have you changed or started any medications since your last visit including any over-the-counter medicines, vitamins, or herbal medicines? no   Have you stopped taking any of your medications? Is so, why? -  no  Are you having any side effects from any of your medications? - no    Have you seen any other physician or provider since your last visit?  no   Have you had any other diagnostic tests since your last visit?  no   Have you been seen in the emergency room and/or had an admission in a hospital since we last saw you?  yes - [facility] 1/[facility] 1/22/23   Have you had your routine dental cleaning in the past 6 months?  no     Do you have an active MyChart account? If no, what is the barrier?   No: declined    Patient Care Team:  Anjali Barry MD as PCP - General (Emergency Medicine)    Medical History Review  Past Medical, Family, and Social History reviewed and does not contribute to the patient presenting condition    Health Maintenance   Topic Date Due    COVID-19 Vaccine (1) Never done    Diabetic retinal exam  Never done    Colorectal Cancer Screen  Never done    Low dose CT lung screening  Never done    Hepatitis B vaccine (3 of 3 - Risk 3-dose series) 05/07/2021    Diabetic Alb to Cr ratio (uACR) test  12/15/2021    Flu vaccine (1) 08/01/2022    Diabetic foot exam  01/13/2023    Depression Screen  01/13/2023    A1C test (Diabetic or Prediabetic)  04/09/2023    Lipids  01/09/2024    GFR test (Diabetes, CKD 3-4, OR last GFR 15-59)  01/22/2024    DTaP/Tdap/Td vaccine (2 - Td or Tdap) 11/11/2025    Shingles vaccine  Completed    Pneumococcal 0-64 years Vaccine  Completed    Hepatitis C screen  Completed    HIV screen  Completed    Hepatitis A vaccine  Aged Out    Hib vaccine  Aged Out    Meningococcal (ACWY) vaccine  Aged Out

## 2023-01-27 NOTE — PATIENT INSTRUCTIONS
Thank you for letting us take care of you today. We hope all your questions were addressed. If a question was overlooked or something else comes to mind after you return home, please contact a member of your Care Team listed below. Your Care Team at [place] [address] is Team #3  Severo Bear, MD (Faculty)  Jesusita Lawton MD (Faculty  Diann Buckner MD (Resident)  Mar Rodriges (Resident)   Kashif Montilla MD (Resident)  Kandace Gutierrez., GLORIA Gallagher., GLORIA Mckeon., BERNARD Restrepo., Jane Kendall., Khang Garrett ([facility])  Levon Amador, [address] (Clinical Practice Manager)  Tammy Key [facility] (Clinical Pharmacist)     Office phone number: [phone]    If you need to get in right away due to illness, please be advised we have \"Same Day\" appointments available Monday-Friday. Please call us at [phone] option #3 to schedule your \"Same Day\" appointment.

## 2023-01-27 NOTE — PROGRESS NOTES
Attending Physician Statement  I have discussed the care of Aspen Ng, 61 y.o. male,including pertinent history and exam findings,  with the resident Dr. Jody Culvre MD.  History:  Chief Complaint   Patient presents with    Altered Mental [place] follow up  1/7/23 to 1/22/23 . THE [facility]     Cologuard pt has at home. No dm eye exam       I have reviewed the key elements of the encounter with the resident. Examination was done by resident as documented in residents note. BP Readings from Last 3 Encounters:   01/27/23 122/60   01/22/23 124/64   08/24/22 130/80     /60 (Site: Right Upper Arm, Position: Sitting, Cuff Size: Medium Adult)   Pulse 75   Temp 97 °F (36.1 °C) (Oral)   Ht 5' 5\" (1.651 m)   Wt 148 lb 6.4 oz (67.3 kg)   BMI 24.70 kg/m²   Lab Results   Component Value Date    WBC 9.8 01/22/2023    HGB 14.0 01/22/2023    HCT 43.0 01/22/2023     01/22/2023    CHOL 114 01/09/2023    TRIG 135 01/09/2023    HDL 26 (L) 01/09/2023    LDLDIRECT 138 (H) 01/13/2022    ALT 18 01/07/2023    AST 16 01/07/2023     01/22/2023    K 4.4 01/22/2023     01/22/2023    CREATININE 0.75 01/22/2023    BUN 9 01/22/2023    CO2 26 01/22/2023    TSH 0.45 01/07/2023    INR 3.5 01/26/2023    LABA1C 12.7 (H) 01/09/2023    LABMICR 220 (H) 01/27/2023     Lab Results   Component Value Date    CALCIUM 9.4 01/22/2023    PHOS 2.3 (L) 01/12/2023     Lab Results   Component Value Date    LDLCHOLESTEROL 61 01/09/2023    LDLDIRECT 138 (H) 01/13/2022     I agree with the assessment, plan and diagnosis of    Diagnosis Orders   1. Cardiomyopathy, ischemic        2.  Type 2 diabetes mellitus with diabetic neuropathy, without long-term current use of insulin (HCC)  Microalbumin, Ur    insulin glargine (LANTUS SOLOSTAR) 100 UNIT/ML injection pen    Insulin Pen Needle 32G X 6 MM MISC    blood glucose test strips (ONETOUCH VERIO) strip    metFORMIN (GLUCOPHAGE) 1000 MG tablet    dapagliflozin (FARXIGA) 10 MG tablet      3. Stenosis of right middle cerebral artery        4. PAD (peripheral artery disease) (HCC)        5. Carotid stenosis, bilateral        6. Coronary artery disease involving native heart without angina pectoris, unspecified vessel or lesion type  aspirin 81 MG chewable tablet      7. Cerebral multi-infarct state  aspirin 81 MG chewable tablet      8. LV (left ventricular) mural thrombus        9. Chronic systolic congestive heart failure (HCC)  dapagliflozin (FARXIGA) 10 MG tablet      10. Hospital discharge follow-up  CO DISCHARGE MEDS RECONCILED W/ CURRENT OUTPATIENT MED LIST    CO DISCHARGE MEDS RECONCILED W/ CURRENT OUTPATIENT MED LIST        I agree with  orders as documented by the resident. Recommendations: Patient may benefit from closer follow up and fall risk evaluation. If patient high fall risk will consider PT. If patient has any history of DKA will consider alternatives to SGLT2. Resident team to confirm patient does not have a history of DKA or any contraindications to SGLT2. Patient to benefit from CGM, will refer to Pharmacy. Close follow up with cardiology and endocrinology strongly advised. Return in 1 month (on 2/27/2023).    ([facility] ) Dr. Adams Nina MD

## 2023-01-29 NOTE — FLOWSHEET NOTE
Stroke Follow up Phone Call    Blilykimberly this is Jarad Rhodes from [facility] stroke team calling to see how you are doing since your d/c. Spoke with Bertrand Yaniqueange. States he is doing well. All questions answered at this time. Medication List        START taking these medications      amLODIPine 10 MG tablet  Commonly known as: NORVASC  Take 1 tablet by mouth daily     carvedilol 25 MG tablet  Commonly known as: COREG  Take 1 tablet by mouth 2 times daily (with meals)     levETIRAcetam 500 MG tablet  Commonly known as: KEPPRA  Take 1 tablet by mouth 2 times daily     lisinopril 20 MG tablet  Commonly known as: PRINIVIL;ZESTRIL  Take 1 tablet by mouth daily     warfarin 5 MG tablet  Commonly known as: Coumadin  Take as directed. If you are unsure how to take this medication, talk to your nurse or doctor. Original instructions:  Take 1 tablet by mouth daily            CHANGE how you take these medications      atorvastatin 80 MG tablet  Commonly known as: LIPITOR  Take 1 tablet by mouth nightly  What changed:   medication strength  how much to take     furosemide 40 MG tablet  Commonly known as: LASIX  Take 1 tablet by mouth daily  What changed:   medication strength  how much to take            CONTINUE taking these medications      * [employer]cur[employer] Blood Glucose Meter w/Device Kit  1 kit by Does not apply route once for 1 dose     * ONE TOUCH ULTRA 2 w/Device Kit     B-D SINGLE USE SWABS REGULAR Pads  USE AS DIRECTED WITH TESTING SUPPLIES     clopidogrel 75 MG tablet  Commonly known as: PLAVIX  TAKE 1 TABLET BY MOUTH DAILY     gabapentin 300 MG capsule  Commonly known as: NEURONTIN  TAKE 1 CAPSULE BY MOUTH EVERY 8 HOURS     Handicap Placard Misc  by Does not apply route For 5 years     Insulin Syringe-Needle U-100 30G X 5/16\" 1 ML Misc  1 each by Does not apply route daily     OneTouch Delica Plus [id number] Misc  USE AS DIRECTED TO TEST BLOOD SUGAR     therapeutic multivitamin-minerals tablet  Take 1 tablet by mouth daily (with breakfast)           * This list has 2 medication(s) that are the same as other medications prescribed for you. Read the directions carefully, and ask your doctor or other care provider to review them with you. STOP taking these medications      lisinopril-hydroCHLOROthiazide 20-25 MG per tablet  Commonly known as: PRINZIDE;ZESTORETIC     metoprolol tartrate 25 MG tablet  Commonly known as: LOPRESSOR     metoprolol tartrate 50 MG tablet  Commonly known as: LOPRESSOR     sildenafil 100 MG tablet  Commonly known as: Viagra            ASK your doctor about these medications      insulin lispro (1 Unit Dial) 100 UNIT/ML Sopn  Commonly known as: HumaLOG KwikPen  INJECT SUBCUTANEOUSLY BEFORE MEALS AS DIRECTED PER SLIDING SCALE: SEE ATTACHED FOR SLIDING SCALE DIRECTIONS: Hold all insulin and notify Dr immediately if BG <60. -200: 2 UNITS; -250: 4UNITS; 251-300 6 UNITS; 301-350: 8 UNITS               Where to Get Your Medications        These medications were sent to [address] [place], [place] [address] [facility], [place] [address]      Phone: [phone]   levETIRAcetam 500 MG tablet       These medications were sent to [facility] [address] [facility], [address] [address]      Phone: [phone]   amLODIPine 10 MG tablet  atorvastatin 80 MG tablet  carvedilol 25 MG tablet  furosemide 40 MG tablet  lisinopril 20 MG tablet  warfarin 5 MG tablet         Can you tell me what medications you are taking? [x]   Yes  []   No    Do you have any questions about your medications? []   Yes  [x]   No    All medications reviewed with patient    Do you have a follow up apt. With the neurologist?   [x]   Yes  []   No  Provided number to [facility] [phone]    Do you now your risk factors for stroke?   [x]   Yes  []   No    Can you tell me the signs and symptoms of stroke? [x]   Yes  []   No  BEFAST instructions provided    Do if you know what to do if you were having signs/symptoms of stroke? [x]   Yes  []   No  Instructions to call 911 provided    Our goal is to provide the very best stroke care, on a scale of 1 to 10 with 10 as the very best who would you rank the care you received? What can we do better?

## 2023-01-31 ENCOUNTER — TELEPHONE (OUTPATIENT)
Dept: FAMILY MEDICINE CLINIC | Age: 58
End: 2023-01-31

## 2023-01-31 NOTE — TELEPHONE ENCOUNTER
Radha Murrell from [employer] requesting last office notes (Signed) to be faxed to [phone] for skilled nursing for pt. Writer faxed.

## 2023-02-03 ENCOUNTER — TELEPHONE (OUTPATIENT)
Dept: PHARMACY | Age: 58
End: 2023-02-03

## 2023-02-03 NOTE — TELEPHONE ENCOUNTER
Patient was a No Call No Show for [facility] appointment today. Called to reschedule, spoke with patient;  his medical cab did nt show, rescheduled. I encouraged him to try to find alternate transportation with family or friends. Also had to call his mother for updated phone number, edited patient information. Reviewed dosing plan with patient, he expressed understanding.     Max Deshpande RPh, JAZIELP  Clinical Pharmacist Medication Management  2/3/2023  9:42 AM

## 2023-02-08 ENCOUNTER — HOSPITAL ENCOUNTER (OUTPATIENT)
Dept: PHARMACY | Age: 58
Setting detail: THERAPIES SERIES
Discharge: HOME | End: 2023-02-08
Payer: MEDICAID

## 2023-02-08 DIAGNOSIS — I65.23 CAROTID STENOSIS, BILATERAL: Primary | ICD-10-CM

## 2023-02-08 DIAGNOSIS — I25.10 CORONARY ARTERY DISEASE INVOLVING NATIVE HEART WITHOUT ANGINA PECTORIS, UNSPECIFIED VESSEL OR LESION TYPE: ICD-10-CM

## 2023-02-08 LAB
INR BLD: 4.6
PROTIME: 55.1 SECONDS

## 2023-02-08 PROCEDURE — 99212 OFFICE O/P EST SF 10 MIN: CPT

## 2023-02-08 PROCEDURE — 85610 PROTHROMBIN TIME: CPT

## 2023-02-08 NOTE — PROGRESS NOTES
Medication Management Service, Warfarin Management  ELZA BENTON [facility], [phone]  Visit Date: 2/8/2023     Subjective:   Raquel Jones is a 61 y.o. male who presents to clinic today for anticoagulation monitoring and adjustment. Patient seen in clinic for warfarin management due to  Indication:    internal carotid stent . INR goal: of 2.0-3.0. Duration of therapy: 6 months, until 7/2023. Assessment and PLAN   PT/INR done in office per protocol. INR today is 4.6, supratherapeutic. Maintenance regimen not yet established for patient as today was his second visit in clinic. Elevated INR due to regimen being too high. Patient also reported decreased intake of green vegetables over the past week (4 servings). He had reported six servings per week at his visit on 01/26/2022. Plan: Will hold today's dose of 5 mg and patient will have a serving of green vegetables with dinner. Will then reduce maintenance regimen by 5.6% to warfarin 5 mg Mon, Wed, Fri, Sat; 7.5 mg all other days of the week. Using warfarin 5 mg tablets. Recheck INR in 1 week(s). Patient seen in room # 2. ED. OP. = Spoke with patient extensively about being consistent with his intake of green vegetables. He reported eating green beans and spinach. Patient again counseled that spinach is much higher in vitamin K content than green beans. Spoke with patient about the increased risk of bleeding with an increased INR. Patient counseled on monitoring for bleeding and when to call the clinic or to seek medical attention. Patient verbalized understanding of dosing directions and information discussed. Dosing schedule given to patient. Progress note sent to referring office. Patient acknowledges working in consult agreement with pharmacist as referred by his/her physician.       Electronically signed by Juanita Vicente, [facility] on 2/8/23 at 2:38 PM LEANNA Umanzor, PharmD, [address]  PGY2 Ambulatory Care Pharmacy Resident   [phone]  Medication Management Service  [phone]  2/8/2023  3:15 PM    =======================================================    For Pharmacy Admin Tracking Only    Intervention Detail: Dose Adjustment: 1, reason: Therapy De-escalation  Total # of Interventions Recommended: 1  Total # of Interventions Accepted: 1  Time Spent (min): 20

## 2023-02-16 ENCOUNTER — HOSPITAL ENCOUNTER (OUTPATIENT)
Dept: PHARMACY | Age: 58
Setting detail: THERAPIES SERIES
Discharge: HOME | End: 2023-02-16
Payer: MEDICAID

## 2023-02-16 DIAGNOSIS — I25.10 CORONARY ARTERY DISEASE INVOLVING NATIVE HEART WITHOUT ANGINA PECTORIS, UNSPECIFIED VESSEL OR LESION TYPE: ICD-10-CM

## 2023-02-16 DIAGNOSIS — I65.23 CAROTID STENOSIS, BILATERAL: Primary | ICD-10-CM

## 2023-02-16 LAB
INR BLD: 6.1
PROTIME: 73.7 SECONDS

## 2023-02-16 PROCEDURE — 85610 PROTHROMBIN TIME: CPT

## 2023-02-16 PROCEDURE — 99212 OFFICE O/P EST SF 10 MIN: CPT

## 2023-02-16 NOTE — PROGRESS NOTES
Medication Management Service, Warfarin Management  ELZA BENTON [facility], [phone]  Visit Date: 2023     Subjective:   Edgar Silva is a 61 y.o. male who presents to clinic today for anticoagulation monitoring and adjustment. Patient seen in clinic for warfarin management due to  Indication:    internal carotid stent . INR goal: of 2.0-3.0. Duration of therapy: 6 months, until 2023. Assessment and PLAN   PT/INR done in office per protocol. INR today is 6.1, supratherapeutic. Elevated INR due to regimen being too high. Plan: Will hold today's dose of warfarin  and patient will have a serving of green vegetables with dinner. Will then reduce maintenance regimen by 11.8% weekly to warfarin 7.5 mg  ; 5 mg all other days of the week. Using warfarin 5 mg tablets. Recheck INR in 1 week(s). Patient seen in room # 2. Ed OP:  Advised continuing 2-3 servings of green vegetables each week. Discussed vitamin K content of foods in detail. Patient verbalized understanding of dosing directions and information discussed. Dosing schedule given to patient. Progress note sent to referring office. Patient acknowledges working in consult agreement with pharmacist as referred by his/her physician. [address] [place] [address]  Ph., CACP, Clinical Pharmacist  Anticoagulation Services, [address] [place] [address] Coumadin Clinic  2023  1:57 PM      For Pharmacy Admin Tracking Only    Intervention Detail: Adherence Monitorin and Dose Adjustment: 1, reason: Therapy De-escalation  Total # of Interventions Recommended: 2  Total # of Interventions Accepted: 2  Time Spent (min): 30

## 2023-02-23 ENCOUNTER — HOSPITAL ENCOUNTER (OUTPATIENT)
Dept: PHARMACY | Age: 58
Setting detail: THERAPIES SERIES
Discharge: HOME | End: 2023-02-23
Payer: MEDICAID

## 2023-02-23 DIAGNOSIS — I25.10 CORONARY ARTERY DISEASE INVOLVING NATIVE HEART WITHOUT ANGINA PECTORIS, UNSPECIFIED VESSEL OR LESION TYPE: ICD-10-CM

## 2023-02-23 DIAGNOSIS — I65.23 CAROTID STENOSIS, BILATERAL: Primary | ICD-10-CM

## 2023-02-23 LAB
INR BLD: 2.3
PROTIME: 27.7 SECONDS

## 2023-02-23 PROCEDURE — 85610 PROTHROMBIN TIME: CPT

## 2023-02-23 PROCEDURE — 99211 OFF/OP EST MAY X REQ PHY/QHP: CPT

## 2023-02-23 NOTE — PROGRESS NOTES
Medication Management Service, Warfarin Management  ELZA BENTON [facility], [phone]  Visit Date: 2023     Subjective:   Sushil Laura is a 61 y.o. male who presents to clinic today for anticoagulation monitoring and adjustment. Patient seen in clinic for warfarin management due to  Indication:    internal carotid stent . INR goal: of 2.0-3.0. Duration of therapy: 6 months, until 2023. Assessment and PLAN   PT/INR done in office per protocol. INR today is 2.3, therapeutic. INR in goal despite taking warfarin 2.5 mg on  not 5 mg as advised. Plan:  Continue current regimen of warfarin 7.5 mg  ; 5 mg all other days of the week. Using warfarin 5 mg tablets. Recheck INR in 2 week(s). Patient seen in room # 2. Removed one medication (gabapentin) from Epic since he is no longer taking it. Ed OP:  Advised continuing about 3 servings of green vegetables each week. He reports 4 servings this past week. Patient verbalized understanding of dosing directions and information discussed. Dosing schedule given to patient. Progress note sent to referring office. Patient acknowledges working in consult agreement with pharmacist as referred by his/her physician. [address] [place] [address]  Ph., CACP, Clinical Pharmacist  Anticoagulation Services, [address] [place] [address] Coumadin Clinic  2023  1:48 PM      For Pharmacy Admin Tracking Only    Intervention Detail: Adherence Monitorin  Total # of Interventions Recommended: 0  Total # of Interventions Accepted: 0  Time Spent (min): 20

## 2023-02-24 ENCOUNTER — OFFICE VISIT (OUTPATIENT)
Dept: FAMILY MEDICINE CLINIC | Age: 58
End: 2023-02-24
Payer: MEDICAID

## 2023-02-24 VITALS
WEIGHT: 151.4 LBS | SYSTOLIC BLOOD PRESSURE: 94 MMHG | DIASTOLIC BLOOD PRESSURE: 48 MMHG | TEMPERATURE: 97 F | HEART RATE: 68 BPM | BODY MASS INDEX: 25.22 KG/M2 | HEIGHT: 65 IN

## 2023-02-24 DIAGNOSIS — I73.9 PAD (PERIPHERAL ARTERY DISEASE) (HCC): ICD-10-CM

## 2023-02-24 DIAGNOSIS — E11.42 TYPE 2 DIABETES MELLITUS WITH DIABETIC POLYNEUROPATHY, WITH LONG-TERM CURRENT USE OF INSULIN (HCC): Primary | ICD-10-CM

## 2023-02-24 DIAGNOSIS — I10 ESSENTIAL HYPERTENSION: ICD-10-CM

## 2023-02-24 DIAGNOSIS — Z79.4 TYPE 2 DIABETES MELLITUS WITH DIABETIC POLYNEUROPATHY, WITH LONG-TERM CURRENT USE OF INSULIN (HCC): Primary | ICD-10-CM

## 2023-02-24 DIAGNOSIS — I66.01 STENOSIS OF RIGHT MIDDLE CEREBRAL ARTERY: ICD-10-CM

## 2023-02-24 DIAGNOSIS — I25.10 CORONARY ARTERY DISEASE INVOLVING NATIVE HEART WITHOUT ANGINA PECTORIS, UNSPECIFIED VESSEL OR LESION TYPE: ICD-10-CM

## 2023-02-24 LAB — HBA1C MFR BLD: 10.3 %

## 2023-02-24 PROCEDURE — G8419 CALC BMI OUT NRM PARAM NOF/U: HCPCS | Performed by: STUDENT IN AN ORGANIZED HEALTH CARE EDUCATION/TRAINING PROGRAM

## 2023-02-24 PROCEDURE — 99213 OFFICE O/P EST LOW 20 MIN: CPT | Performed by: STUDENT IN AN ORGANIZED HEALTH CARE EDUCATION/TRAINING PROGRAM

## 2023-02-24 PROCEDURE — 1036F TOBACCO NON-USER: CPT | Performed by: STUDENT IN AN ORGANIZED HEALTH CARE EDUCATION/TRAINING PROGRAM

## 2023-02-24 PROCEDURE — 3074F SYST BP LT 130 MM HG: CPT | Performed by: STUDENT IN AN ORGANIZED HEALTH CARE EDUCATION/TRAINING PROGRAM

## 2023-02-24 PROCEDURE — 3078F DIAST BP <80 MM HG: CPT | Performed by: STUDENT IN AN ORGANIZED HEALTH CARE EDUCATION/TRAINING PROGRAM

## 2023-02-24 PROCEDURE — 3046F HEMOGLOBIN A1C LEVEL >9.0%: CPT | Performed by: STUDENT IN AN ORGANIZED HEALTH CARE EDUCATION/TRAINING PROGRAM

## 2023-02-24 PROCEDURE — 2022F DILAT RTA XM EVC RTNOPTHY: CPT | Performed by: STUDENT IN AN ORGANIZED HEALTH CARE EDUCATION/TRAINING PROGRAM

## 2023-02-24 PROCEDURE — G8427 DOCREV CUR MEDS BY ELIG CLIN: HCPCS | Performed by: STUDENT IN AN ORGANIZED HEALTH CARE EDUCATION/TRAINING PROGRAM

## 2023-02-24 PROCEDURE — 83036 HEMOGLOBIN GLYCOSYLATED A1C: CPT | Performed by: STUDENT IN AN ORGANIZED HEALTH CARE EDUCATION/TRAINING PROGRAM

## 2023-02-24 PROCEDURE — G8482 FLU IMMUNIZE ORDER/ADMIN: HCPCS | Performed by: STUDENT IN AN ORGANIZED HEALTH CARE EDUCATION/TRAINING PROGRAM

## 2023-02-24 PROCEDURE — 3017F COLORECTAL CA SCREEN DOC REV: CPT | Performed by: STUDENT IN AN ORGANIZED HEALTH CARE EDUCATION/TRAINING PROGRAM

## 2023-02-24 RX ORDER — ATORVASTATIN CALCIUM 80 MG/1
80 TABLET, FILM COATED ORAL NIGHTLY
Qty: 30 TABLET | Refills: 5 | Status: SHIPPED | OUTPATIENT
Start: 2023-02-24

## 2023-02-24 RX ORDER — LISINOPRIL 10 MG/1
10 TABLET ORAL DAILY
Qty: 30 TABLET | Refills: 2 | Status: SHIPPED | OUTPATIENT
Start: 2023-02-24

## 2023-02-24 RX ORDER — AMLODIPINE BESYLATE 10 MG/1
10 TABLET ORAL DAILY
Qty: 30 TABLET | Refills: 3 | Status: CANCELLED | OUTPATIENT
Start: 2023-02-24

## 2023-02-24 RX ORDER — LISINOPRIL 20 MG/1
20 TABLET ORAL DAILY
Qty: 30 TABLET | Refills: 3 | Status: CANCELLED | OUTPATIENT
Start: 2023-02-24

## 2023-02-24 RX ORDER — CLOPIDOGREL BISULFATE 75 MG/1
75 TABLET ORAL DAILY
Qty: 30 TABLET | Refills: 2 | Status: SHIPPED | OUTPATIENT
Start: 2023-02-24

## 2023-02-24 RX ORDER — CARVEDILOL 25 MG/1
25 TABLET ORAL 2 TIMES DAILY WITH MEALS
Qty: 60 TABLET | Refills: 5 | Status: SHIPPED | OUTPATIENT
Start: 2023-02-24

## 2023-02-24 NOTE — PATIENT INSTRUCTIONS
Do not take amlodipine anymore. Lisinopril dose is changed from 20 mg daily to 10 mg daily. Continue taking other medications as prescribed. Thank you for letting us take care of you today. We hope all your questions were addressed. If a question was overlooked or something else comes to mind after you return home, please contact a member of your Care Team listed below. Your Care Team at [place] [address] is Team #3  Melecio Issa MD (Faculty)  Yee Dobson MD (Faculty  Marysusie Mcleod MD (Resident)  Candace Phipps (Resident)   Rupal Orellana MD (Resident)  Jefferson Perez., GLORIA Paredes., GLORIA Wood., BERNARD Burk., Iain Rabago., Aguila Mariano ([facility])  Connie Landaverde, [facility] (Clinical Practice Manager)  Kaveh Boyer [facility] (Clinical Pharmacist)     Office phone number: [phone]    If you need to get in right away due to illness, please be advised we have \"Same Day\" appointments available Monday-Friday. Please call us at [phone] option #3 to schedule your \"Same Day\" appointment.

## 2023-02-24 NOTE — PROGRESS NOTES
Visit Information    Have you changed or started any medications since your last visit including any over-the-counter medicines, vitamins, or herbal medicines? no   Have you stopped taking any of your medications? Is so, why? -  no  Are you having any side effects from any of your medications? - no    Have you seen any other physician or provider since your last visit? yes - Anticoagulation   Have you had any other diagnostic tests since your last visit? yes - Labs   Have you been seen in the emergency room and/or had an admission in a hospital since we last saw you?  no   Have you had your routine dental cleaning in the past 6 months?  no     Do you have an active MyChart account? If no, what is the barrier?   No: Declines    Patient Care Team:  Maggi Owens MD as PCP - General (Emergency Medicine)    Medical History Review  Past Medical, Family, and Social History reviewed and does contribute to the patient presenting condition    Health Maintenance   Topic Date Due    COVID-19 Vaccine (1) Never done    Diabetic retinal exam  Never done    Colorectal Cancer Screen  Never done    Low dose CT lung screening  Never done    Diabetic foot exam  01/13/2023    A1C test (Diabetic or Prediabetic)  04/09/2023    Lipids  01/09/2024    GFR test (Diabetes, CKD 3-4, OR last GFR 15-59)  01/22/2024    Diabetic Alb to Cr ratio (uACR) test  01/27/2024    Depression Screen  01/27/2024    DTaP/Tdap/Td vaccine (2 - Td or Tdap) 11/11/2025    Hepatitis B vaccine  Completed    Flu vaccine  Completed    Shingles vaccine  Completed    Pneumococcal 0-64 years Vaccine  Completed    Hepatitis C screen  Completed    HIV screen  Completed    Hepatitis A vaccine  Aged Out    Hib vaccine  Aged Out    Meningococcal (ACWY) vaccine  Aged Out

## 2023-02-24 NOTE — PROGRESS NOTES
[address] [facility] Medicine Residency Program - Outpatient Note      Subjective:      Tiffany Brown is a 61 y.o. male  presented to the office on 02/24/23 for follow-up of altered mental status and hypertension. He was recently admitted in the hospital for AMS, multivessel CAD, left subclavian severe stenosis. Stent was placed in subclavian artery. Multifocal brain infarcts. Extensive work-up was done. Patient discharged on DAPT and Coumadin for LV thrombus. Patient came today, reports feeling fine denies any new concerns. Blood pressure runs in 90s/50s. Denies lightheadedness, chest pain or shortness of breath. Noted to be on lisinopril 20, Coreg 25 twice daily, Lasix. No incidence of bleeding or fall. DM2 control has lately been good. Morning sugars running between 110-130 and BP sugars run around 140-160. No episodes of hypoglycemia or glucose above 200 as per patient. Her A1c today in the office is 10.3, improved from 12.7 about a month ago. Patient on Lantus 24 units, metformin thousand twice daily and Farxiga 10. Review of systems  Positive as mentioned above in subjective. All other systems negative. Objective:      Vitals:    02/24/23 0902   BP: (!) 94/48   Pulse: 68   Temp:        General Appearance - Alert and oriented x 3. Appears frail, sitting on wheeled walker  Lungs - Bilateral good air entry , no wheezes or rales  Cardiovascular - Regular rate and rhythm. No murmur  Abdomen - Soft and nontender  Neurologic - No new focal motor or sensory deficits  Skin - No bruising or bleeding on exposed skin area  MSK -right AKA. No other joint pains      Assessment:        ICD-10-CM    1. Type 2 diabetes mellitus with diabetic polyneuropathy, with long-term current use of insulin (HCC)  E11.42 atorvastatin (LIPITOR) 80 MG tablet    Z79.4 lisinopril (PRINIVIL;ZESTRIL) 10 MG tablet     POCT glycosylated hemoglobin (Hb A1C)      2.  PAD (peripheral artery disease) ([facility]  I73.9 atorvastatin (LIPITOR) 80 MG tablet     carvedilol (COREG) 25 MG tablet     clopidogrel (PLAVIX) 75 MG tablet      3. Essential hypertension  I10 lisinopril (PRINIVIL;ZESTRIL) 10 MG tablet      4. Coronary artery disease involving native heart without angina pectoris, unspecified vessel or lesion type  I25.10 atorvastatin (LIPITOR) 80 MG tablet     carvedilol (COREG) 25 MG tablet     clopidogrel (PLAVIX) 75 MG tablet     lisinopril (PRINIVIL;ZESTRIL) 10 MG tablet      5. Stenosis of right middle cerebral artery  I66.01 atorvastatin (LIPITOR) 80 MG tablet     clopidogrel (PLAVIX) 75 MG tablet          Plan:    DM2-A1c level though is 10.3 but lately higher fasting and postprandial sugars are under reasonably good control. We will continue the same treatment and check again in 1-2 months time. Risk of hypoglycemia is high and patient is already high fall risk and on DAPT and anticoagulation. Hypertension-lately patient is running hypotensive to 90s/40s. Amlodipine and hydrochlorothiazide are discontinued. We will cut down his lisinopril from 20 mg to 10 mg daily. Continue DAPT for extensive PAD and CAD. Lipitor 80 mg daily. Continue Coumadin for LV thrombus. Discussed that he will need close follow-up. High fall risk, safety instructions provided. Risk and benefits of antiplatelet/anticoagulation discussed and decided to continue with current therapy. Patient agrees and understands. [employer] screening today. Will address at next appointment. Return in about 2 weeks (around 3/10/2023) for HTN.        Requested Prescriptions     Signed Prescriptions Disp Refills    atorvastatin (LIPITOR) 80 MG tablet 30 tablet 5     Sig: Take 1 tablet by mouth nightly    carvedilol (COREG) 25 MG tablet 60 tablet 5     Sig: Take 1 tablet by mouth 2 times daily (with meals)    clopidogrel (PLAVIX) 75 MG tablet 30 tablet 2     Sig: Take 1 tablet by mouth daily    lisinopril (PRINIVIL;ZESTRIL) 10 MG tablet 30 tablet 2     Sig: Take 1 tablet by mouth daily       Medications Discontinued During This Encounter   Medication Reason    amLODIPine (NORVASC) 10 MG tablet Therapy completed    clopidogrel (PLAVIX) 75 MG tablet REORDER    atorvastatin (LIPITOR) 80 MG tablet REORDER    lisinopril (PRINIVIL;ZESTRIL) 20 MG tablet     carvedilol (COREG) 25 MG tablet REORDER       Discussed use, benefit, and side effects of prescribed medications. Barriers to medication compliance addressed. All patient questions answered. Pt voiced understanding. Disclaimer: Some orall of this note was transcribed using voice-recognition software. This may cause typographical errors occasionally. Although all effort is made to fix these errors, please do not hesitate to contact our office if there Lalita Nose concern with the understanding of this note.     Georgie Loyd MD  Family Medicine PGY-3  02/24/23 at 10:39 AM

## 2023-02-27 NOTE — PROGRESS NOTES
Attending Physician Statement    Wt Readings from Last 3 Encounters:   02/24/23 151 lb 6.4 oz (68.7 kg)   01/27/23 148 lb 6.4 oz (67.3 kg)   01/21/23 142 lb 3.2 oz (64.5 kg)     Temp Readings from Last 3 Encounters:   02/24/23 97 °F (36.1 °C) (Oral)   01/27/23 97 °F (36.1 °C) (Oral)   01/22/23 98.5 °F (36.9 °C) (Oral)     BP Readings from Last 3 Encounters:   02/24/23 (!) 94/48   01/27/23 122/60   01/22/23 124/64     Pulse Readings from Last 3 Encounters:   02/24/23 68   01/27/23 75   01/22/23 72         I have discussed the care of Jessica Green, including pertinent history and exam findings with the resident. I have reviewed the key elements of all parts of the encounter with the resident. I agree with the assessment, plan and orders as documented by the resident.   ([employer])

## 2023-03-02 DIAGNOSIS — E11.40 TYPE 2 DIABETES MELLITUS WITH DIABETIC NEUROPATHY, WITHOUT LONG-TERM CURRENT USE OF INSULIN (HCC): ICD-10-CM

## 2023-03-02 RX ORDER — GABAPENTIN 300 MG/1
CAPSULE ORAL
Qty: 90 CAPSULE | Refills: 1 | OUTPATIENT
Start: 2023-03-02 | End: 2023-04-01

## 2023-03-02 RX ORDER — INSULIN GLARGINE 100 [IU]/ML
24 INJECTION, SOLUTION SUBCUTANEOUS NIGHTLY
Qty: 15 ML | Refills: 5 | Status: SHIPPED | OUTPATIENT
Start: 2023-03-02 | End: 2023-04-06 | Stop reason: SDUPTHER

## 2023-03-09 ENCOUNTER — HOSPITAL ENCOUNTER (OUTPATIENT)
Dept: PHARMACY | Age: 58
Setting detail: THERAPIES SERIES
Discharge: HOME | End: 2023-03-09
Payer: MEDICAID

## 2023-03-09 DIAGNOSIS — I25.10 CORONARY ARTERY DISEASE INVOLVING NATIVE HEART WITHOUT ANGINA PECTORIS, UNSPECIFIED VESSEL OR LESION TYPE: ICD-10-CM

## 2023-03-09 DIAGNOSIS — I65.23 CAROTID STENOSIS, BILATERAL: Primary | ICD-10-CM

## 2023-03-09 LAB
INR BLD: 2.9
PROTIME: 35.1 SECONDS

## 2023-03-09 PROCEDURE — 85610 PROTHROMBIN TIME: CPT

## 2023-03-09 PROCEDURE — 99212 OFFICE O/P EST SF 10 MIN: CPT

## 2023-03-09 PROCEDURE — 99211 OFF/OP EST MAY X REQ PHY/QHP: CPT

## 2023-03-09 RX ORDER — WARFARIN SODIUM 5 MG/1
TABLET ORAL
Qty: 65 TABLET | Refills: 1 | Status: SHIPPED | OUTPATIENT
Start: 2023-03-09

## 2023-03-09 NOTE — PROGRESS NOTES
Medication Management Service, Warfarin Management  [facility], [phone]  Visit Date: 3/9/2023     Subjective:   aDsh Mehta is a 57 y.o. male who presents to clinic today for anticoagulation monitoring and adjustment.  Patient seen in clinic for warfarin management due to  Indication:    internal carotid stent .   INR goal: of 2.0-3.0.  Duration of therapy: 6 months, until 2023.    Assessment and PLAN   PT/INR done in office per protocol.   INR today is 2.9, therapeutic.       Plan:  Continue current regimen of warfarin 7.5 mg   only; 5mg all other days of the week.  Using warfarin 5 mg tablets.  Recheck INR in 3 week(s).   Patient seen in room # 3.     Ed OP:  Advised continuing about 3 servings of green vegetables each week. Had long discussion regarding vitamin K foods, including blueberries.  Patient expressed understanding.    Asked patient to check warfarin supply and call us if he has no refills and does not have enough to get through to next appointment, he agreed.     Patient verbalized understanding of dosing directions and information discussed. Dosing schedule given to patient. Progress note sent to referring office.    Rosalee Rahman RP, CACP  Clinical Pharmacist Medication Management  3/9/2023  11:00 AM      For Pharmacy Admin Tracking Only    Intervention Detail: Adherence Monitorin  Total # of Interventions Recommended: 1  Total # of Interventions Accepted: 1  Time Spent (min):  25

## 2023-03-09 NOTE — PROGRESS NOTES
Sobeida Stephens called clinic to report that he needs a new warfarin script. Per his request, new script sent to [address] [place] [address].       Luis Boyer RPh, JAZIELP  Clinical Pharmacist Medication Management  3/9/2023  2:03 PM

## 2023-03-10 ENCOUNTER — OFFICE VISIT (OUTPATIENT)
Dept: FAMILY MEDICINE CLINIC | Age: 58
End: 2023-03-10

## 2023-03-10 VITALS
SYSTOLIC BLOOD PRESSURE: 160 MMHG | WEIGHT: 152 LBS | TEMPERATURE: 97 F | HEART RATE: 77 BPM | BODY MASS INDEX: 25.29 KG/M2 | DIASTOLIC BLOOD PRESSURE: 74 MMHG

## 2023-03-10 DIAGNOSIS — Z79.4 TYPE 2 DIABETES MELLITUS WITH DIABETIC POLYNEUROPATHY, WITH LONG-TERM CURRENT USE OF INSULIN (HCC): ICD-10-CM

## 2023-03-10 DIAGNOSIS — R91.1 LUNG NODULE: ICD-10-CM

## 2023-03-10 DIAGNOSIS — I25.10 CORONARY ARTERY DISEASE INVOLVING NATIVE HEART WITHOUT ANGINA PECTORIS, UNSPECIFIED VESSEL OR LESION TYPE: ICD-10-CM

## 2023-03-10 DIAGNOSIS — I10 ESSENTIAL HYPERTENSION: Primary | ICD-10-CM

## 2023-03-10 DIAGNOSIS — E11.42 TYPE 2 DIABETES MELLITUS WITH DIABETIC POLYNEUROPATHY, WITH LONG-TERM CURRENT USE OF INSULIN (HCC): ICD-10-CM

## 2023-03-10 RX ORDER — LISINOPRIL 20 MG/1
20 TABLET ORAL DAILY
Qty: 30 TABLET | Refills: 2 | Status: SHIPPED | OUTPATIENT
Start: 2023-03-10

## 2023-03-10 ASSESSMENT — PATIENT HEALTH QUESTIONNAIRE - PHQ9
SUM OF ALL RESPONSES TO PHQ9 QUESTIONS 1 & 2: 0
1. LITTLE INTEREST OR PLEASURE IN DOING THINGS: 0
SUM OF ALL RESPONSES TO PHQ QUESTIONS 1-9: 0
2. FEELING DOWN, DEPRESSED OR HOPELESS: 0
SUM OF ALL RESPONSES TO PHQ QUESTIONS 1-9: 0

## 2023-03-10 NOTE — PROGRESS NOTES
Visit Information    Have you changed or started any medications since your last visit including any over-the-counter medicines, vitamins, or herbal medicines? no   Have you stopped taking any of your medications? Is so, why? -  no  Are you having any side effects from any of your medications? - no    Have you seen any other physician or provider since your last visit?  no   Have you had any other diagnostic tests since your last visit?  no   Have you been seen in the emergency room and/or had an admission in a hospital since we last saw you?  no   Have you had your routine dental cleaning in the past 6 months?  no     Do you have an active MyChart account? If no, what is the barrier?   No:     Patient Care Team:  Thiago Mena MD as PCP - General (Emergency Medicine)    Medical History Review  Past Medical, Family, and Social History reviewed and does not contribute to the patient presenting condition    Health Maintenance   Topic Date Due    COVID-19 Vaccine (1) Never done    Diabetic retinal exam  Never done    Colorectal Cancer Screen  Never done    Low dose CT lung screening  Never done    Diabetic foot exam  01/13/2023    A1C test (Diabetic or Prediabetic)  05/24/2023    Lipids  01/09/2024    GFR test (Diabetes, CKD 3-4, OR last GFR 15-59)  01/22/2024    Diabetic Alb to Cr ratio (uACR) test  01/27/2024    Depression Screen  01/27/2024    DTaP/Tdap/Td vaccine (2 - Td or Tdap) 11/11/2025    Hepatitis B vaccine  Completed    Flu vaccine  Completed    Shingles vaccine  Completed    Pneumococcal 0-64 years Vaccine  Completed    Hepatitis C screen  Completed    HIV screen  Completed    Hepatitis A vaccine  Aged Out    Hib vaccine  Aged Out    Meningococcal (ACWY) vaccine  Aged Out

## 2023-03-10 NOTE — PROGRESS NOTES
6 [facility] Medicine Residency Program - Outpatient Note      Subjective:      Rosita Garza is a 61 y.o. male  presented to the office on 03/10/23 for hypertension follow-up. Symptoms well controlled. Blood pressure slightly running high. Recently medications were adjusted. Denies any new symptoms. Compliant with medications. CAD-stable  Diabetes-adjusting medications, follow-up A1c and 1 month. Lung nodule on CT chest last year. Follow-up as due. Denies worsening cough, hemoptysis, weight loss or shortness of breath. Review of systems  Positive as mentioned above in subjective. All other systems negative. Objective:      Vitals:    03/10/23 0940   BP: (!) 160/74   Pulse: 77   Temp: 97 °F (36.1 °C)       General Appearance - Alert and oriented x 3  Lungs - Bilateral good air entry , no wheezes or rales  Cardiovascular - Regular rate and rhythm. No murmur  Abdomen - Soft and nontender  Neurologic - No new focal motor or sensory deficits  Skin - No bruising or bleeding on exposed skin area  MSK - No new joint/bone pains       Assessment:        ICD-10-CM    1. Essential hypertension  I10 lisinopril (PRINIVIL;ZESTRIL) 20 MG tablet      2. Type 2 diabetes mellitus with diabetic polyneuropathy, with long-term current use of insulin ([facility])  E11.42 lisinopril (PRINIVIL;ZESTRIL) 20 MG tablet    Z79.4       3. Coronary artery disease involving native heart without angina pectoris, unspecified vessel or lesion type  I25.10 lisinopril (PRINIVIL;ZESTRIL) 20 MG tablet      4. Lung nodule  R91.1 CT CHEST WO CONTRAST          Plan:    Increase lisinopril to 20 mg daily  Continue same treatment for diabetes. CT chest without contrast for follow-up of lung nodule. Return in about 4 weeks (around 4/7/2023) for Hypertension, lung nodule, diabetes.        Requested Prescriptions     Signed Prescriptions Disp Refills    lisinopril (PRINIVIL;ZESTRIL) 20 MG tablet 30 tablet 2     Sig: Take 1 tablet by mouth daily       Medications Discontinued During This Encounter   Medication Reason    lisinopril (PRINIVIL;ZESTRIL) 10 MG tablet        Discussed use, benefit, and side effects of prescribed medications. Barriers to medication compliance addressed. All patient questions answered. Pt voiced understanding. Disclaimer: Some orall of this note was transcribed using voice-recognition software. This may cause typographical errors occasionally. Although all effort is made to fix these errors, please do not hesitate to contact our office if there Juanita Rodriguez concern with the understanding of this note.     Mirna Borges MD  Family Medicine PGY-3  03/10/23 at 12:17 PM

## 2023-03-10 NOTE — PATIENT INSTRUCTIONS
Thank you for letting us take care of you today. We hope all your questions were addressed. If a question was overlooked or something else comes to mind after you return home, please contact a member of your Care Team listed below. Your Care Team at [place] [address] is Team #3  Dinah Estrada MD (Faculty)  Lorrie Rose MD (Faculty  Brian Staley MD (Resident)  Monica Luna (Resident)   Darcy Dumont MD (Resident)  BERNARD Kelley., GLORIA Estrada., GLORIA Richardson ([facility])  Alecia Lauren, [address] [facility] ([address] [facility])    Shaniqua Underwood, [address] [facility] (Clinical Pharmacist)     Office phone number: [phone]    If you need to get in right away due to illness, please be advised we have \"Same Day\" appointments available Monday-Friday. Please call us at [phone] option #3 to schedule your \"Same Day\" appointment.

## 2023-03-10 NOTE — PROGRESS NOTES
Attending Physician Statement  I have discussed the care of Linda Mari, 61 y.o. male,including pertinent history and exam findings,  with the resident Dr. Alma Rosa Mohan MD.  History:  Chief Complaint   Patient presents with    Hypertension     Patient is following up on low bp     I have reviewed the key elements of the encounter with the resident. Examination was done by resident as documented in residents note. BP Readings from Last 3 Encounters:   03/10/23 (!) 160/74   02/24/23 (!) 94/48   01/27/23 122/60     BP (!) 160/74 (Site: Right Upper Arm, Position: Sitting, Cuff Size: Medium Adult)   Pulse 77   Temp 97 °F (36.1 °C) (Oral)   Wt 152 lb (68.9 kg)   BMI 25.29 kg/m²   Lab Results   Component Value Date    WBC 9.8 01/22/2023    HGB 14.0 01/22/2023    HCT 43.0 01/22/2023     01/22/2023    CHOL 114 01/09/2023    TRIG 135 01/09/2023    HDL 26 (L) 01/09/2023    LDLDIRECT 138 (H) 01/13/2022    ALT 18 01/07/2023    AST 16 01/07/2023     01/22/2023    K 4.4 01/22/2023     01/22/2023    CREATININE 0.75 01/22/2023    BUN 9 01/22/2023    CO2 26 01/22/2023    TSH 0.45 01/07/2023    INR 2.9 03/09/2023    LABA1C 10.3 02/24/2023    LABMICR 220 (H) 01/27/2023     Lab Results   Component Value Date    CALCIUM 9.4 01/22/2023    PHOS 2.3 (L) 01/12/2023     Lab Results   Component Value Date    LDLCHOLESTEROL 61 01/09/2023    LDLDIRECT 138 (H) 01/13/2022     I agree with the assessment, plan and diagnosis of    Diagnosis Orders   1. Essential hypertension  lisinopril (PRINIVIL;ZESTRIL) 20 MG tablet      2. Type 2 diabetes mellitus with diabetic polyneuropathy, with long-term current use of insulin (HCC)  lisinopril (PRINIVIL;ZESTRIL) 20 MG tablet      3. Coronary artery disease involving native heart without angina pectoris, unspecified vessel or lesion type  lisinopril (PRINIVIL;ZESTRIL) 20 MG tablet      4.  Lung nodule  CT CHEST WO CONTRAST        I agree with  orders as documented by the resident. Recommendations: Agree with resident assessment and plan. Return in about 4 weeks (around 4/7/2023) for Hypertension, lung nodule, diabetes.    (Dima Coleman ) Dr. Twyla Norris MD

## 2023-03-29 ENCOUNTER — HOSPITAL ENCOUNTER (OUTPATIENT)
Dept: CT IMAGING | Age: 58
Discharge: HOME | End: 2023-03-31
Payer: MEDICAID

## 2023-03-29 ENCOUNTER — HOSPITAL ENCOUNTER (OUTPATIENT)
Dept: PHARMACY | Age: 58
Setting detail: THERAPIES SERIES
Discharge: HOME | End: 2023-03-29
Payer: MEDICAID

## 2023-03-29 DIAGNOSIS — R91.1 LUNG NODULE: ICD-10-CM

## 2023-03-29 DIAGNOSIS — I25.10 CORONARY ARTERY DISEASE INVOLVING NATIVE HEART WITHOUT ANGINA PECTORIS, UNSPECIFIED VESSEL OR LESION TYPE: ICD-10-CM

## 2023-03-29 DIAGNOSIS — I65.23 CAROTID STENOSIS, BILATERAL: Primary | ICD-10-CM

## 2023-03-29 LAB
INR BLD: 2.2
PROTIME: 26.6 SECONDS

## 2023-03-29 PROCEDURE — 85610 PROTHROMBIN TIME: CPT

## 2023-03-29 PROCEDURE — 71250 CT THORAX DX C-: CPT

## 2023-03-29 PROCEDURE — 99211 OFF/OP EST MAY X REQ PHY/QHP: CPT

## 2023-03-29 NOTE — PROGRESS NOTES
Medication Management Service, Warfarin Management  ELZA BENTON [facility], [phone]  Visit Date: 3/29/2023   Subjective:   Mikayla Blum is a 61 y.o. male who presents to clinic today for anticoagulation monitoring and adjustment. Patient seen in clinic for warfarin management due to  Indication:    internal carotid stent . INR goal: of 2.0-3.0. Duration of therapy: 6 months until 7/2023. Assessment and PLAN   PT/INR done in office per protocol. INR today is 2.2, therapeutic. Plan: Will continue current regimen of warfarin 7.5 mg every Thu; 5 mg all other days of the week. Using warfarin 5 mg tablets. Recheck INR in 4 week(s). Patient seen in room # 2. ED. OP. = Decreased appetite since last appt. Patient continued 3 servings of green beans per week. Patient verbalized understanding of dosing directions and information discussed. Dosing schedule given to patient. Progress note sent to referring office. Patient acknowledges working in consult agreement with pharmacist as referred by his/her physician.       Electronically signed by Bernard Guadalupe on 3/29/23 at 10:37 AM EDT     For Pharmacy Admin Tracking Only    Intervention Detail:   Total # of Interventions Recommended: 0  Total # of Interventions Accepted: 0  Time Spent (min): 20

## 2023-04-06 ENCOUNTER — OFFICE VISIT (OUTPATIENT)
Dept: FAMILY MEDICINE CLINIC | Age: 58
End: 2023-04-06
Payer: MEDICAID

## 2023-04-06 VITALS
BODY MASS INDEX: 24.43 KG/M2 | SYSTOLIC BLOOD PRESSURE: 124 MMHG | OXYGEN SATURATION: 97 % | WEIGHT: 152 LBS | HEART RATE: 66 BPM | HEIGHT: 66 IN | DIASTOLIC BLOOD PRESSURE: 67 MMHG

## 2023-04-06 DIAGNOSIS — E11.40 TYPE 2 DIABETES MELLITUS WITH DIABETIC NEUROPATHY, WITHOUT LONG-TERM CURRENT USE OF INSULIN (HCC): ICD-10-CM

## 2023-04-06 DIAGNOSIS — R06.00 DYSPNEA, UNSPECIFIED TYPE: ICD-10-CM

## 2023-04-06 DIAGNOSIS — R91.8 MULTIPLE LUNG NODULES ON CT: Primary | ICD-10-CM

## 2023-04-06 PROCEDURE — G8427 DOCREV CUR MEDS BY ELIG CLIN: HCPCS | Performed by: STUDENT IN AN ORGANIZED HEALTH CARE EDUCATION/TRAINING PROGRAM

## 2023-04-06 PROCEDURE — 3046F HEMOGLOBIN A1C LEVEL >9.0%: CPT | Performed by: STUDENT IN AN ORGANIZED HEALTH CARE EDUCATION/TRAINING PROGRAM

## 2023-04-06 PROCEDURE — G8420 CALC BMI NORM PARAMETERS: HCPCS | Performed by: STUDENT IN AN ORGANIZED HEALTH CARE EDUCATION/TRAINING PROGRAM

## 2023-04-06 PROCEDURE — 1036F TOBACCO NON-USER: CPT | Performed by: STUDENT IN AN ORGANIZED HEALTH CARE EDUCATION/TRAINING PROGRAM

## 2023-04-06 PROCEDURE — 99211 OFF/OP EST MAY X REQ PHY/QHP: CPT | Performed by: STUDENT IN AN ORGANIZED HEALTH CARE EDUCATION/TRAINING PROGRAM

## 2023-04-06 PROCEDURE — 3017F COLORECTAL CA SCREEN DOC REV: CPT | Performed by: STUDENT IN AN ORGANIZED HEALTH CARE EDUCATION/TRAINING PROGRAM

## 2023-04-06 PROCEDURE — 3074F SYST BP LT 130 MM HG: CPT | Performed by: STUDENT IN AN ORGANIZED HEALTH CARE EDUCATION/TRAINING PROGRAM

## 2023-04-06 PROCEDURE — 3078F DIAST BP <80 MM HG: CPT | Performed by: STUDENT IN AN ORGANIZED HEALTH CARE EDUCATION/TRAINING PROGRAM

## 2023-04-06 PROCEDURE — 99213 OFFICE O/P EST LOW 20 MIN: CPT | Performed by: STUDENT IN AN ORGANIZED HEALTH CARE EDUCATION/TRAINING PROGRAM

## 2023-04-06 PROCEDURE — 2022F DILAT RTA XM EVC RTNOPTHY: CPT | Performed by: STUDENT IN AN ORGANIZED HEALTH CARE EDUCATION/TRAINING PROGRAM

## 2023-04-06 RX ORDER — INSULIN GLARGINE 100 [IU]/ML
24 INJECTION, SOLUTION SUBCUTANEOUS NIGHTLY
Qty: 15 ML | Refills: 5 | Status: SHIPPED | OUTPATIENT
Start: 2023-04-06 | End: 2023-05-06

## 2023-04-06 NOTE — PROGRESS NOTES
6 [facility] Medicine Residency Program - Outpatient Note      Subjective:      Nilo Valenzuela is a 61 y.o. male  presented to the office on 04/06/23 to discuss CT scan results. Recent CT scan shows interval increase in size of right upper lobe lung lobe nodule size from 12 to 15 mm. Patient denies having dyspnea, shortness of breath or weight loss symptoms. He is a chronic smoker with > 40-pack-year history. Not on any inhalers. Has significant history of CABG, carotid stenosis status post stenting, peripheral vascular disease status post right above-knee amputation. He quit smoking in January this year. Review of systems  Positive as mentioned above in subjective. All other systems negative. Objective:      Vitals:    04/06/23 1527   BP: 124/67   Pulse: 66   SpO2: 97%       Physical exam:  General Appearance - Alert and oriented x 3  Lungs - Bilateral decreased air entry, no wheezes or rales  Cardiovascular - Regular rate and rhythm. No murmur  Neurologic - No new focal motor or sensory deficits  Skin - No bruising or bleeding on exposed skin area  MSK - No new joint/bone pains       Assessment:        ICD-10-CM    1. Multiple lung nodules on CT  R91.8 CT NEEDLE BIOPSY LUNG PERCUTANEOUS     Jolynn - Elvira Betts MD, Pulmonology, [place]      2. Type 2 diabetes mellitus with diabetic neuropathy, without long-term current use of insulin (HCC)  E11.40 insulin glargine (LANTUS SOLOSTAR) 100 UNIT/ML injection pen     metFORMIN (GLUCOPHAGE) 1000 MG tablet      3. Dyspnea, unspecified type  R06.00 Full PFT Study With Bronchodilator          Plan:    Right lung nodule increased in size. We will go ahead and do CT-guided biopsy. We will check pulmonary function test.  Pulmonology referral provided to keep a close eye. Diabetic medications refilled. A1c coming down. Return in about 4 weeks (around 5/4/2023) for Lung biopsy, PFT.        Requested Prescriptions

## 2023-04-06 NOTE — PROGRESS NOTES
HYPERTENSION visit     BP Readings from Last 3 Encounters:   03/10/23 (!) 160/74   23 (!) 94/48   23 122/60       LDL Cholesterol (mg/dL)   Date Value   2023 61     HDL (mg/dL)   Date Value   2023 26 (L)     BUN (mg/dL)   Date Value   2023 9     Creatinine (mg/dL)   Date Value   2023 0.75     Glucose (mg/dL)   Date Value   2023 124 (H)              Have you changed or started any medications since your last visit including any over-the-counter medicines, vitamins, or herbal medicines? no   Have you stopped taking any of your medications? Is so, why? -  no  Are you having any side effects from any of your medications? - no  How often do you miss doses of your medication? no      Have you seen any other physician or provider since your last visit? yes - Anticoagulation clinic   Have you had any other diagnostic tests since your last visit? yes - Labs    Have you been seen in the emergency room and/or had an admission in a hospital since we last saw you?  no   Have you had your routine dental cleaning in the past 6 months?  no     Do you have an active MyChart account? If no, what is the barrier?   No:      Patient Care Team:  Sumaya Schroeder MD as PCP - General (Emergency Medicine)    Medical History Review  Past Medical, Family, and Social History reviewed and does not contribute to the patient presenting condition    Health Maintenance   Topic Date Due    COVID-19 Vaccine (1) Never done    Diabetic retinal exam  Never done    Colorectal Cancer Screen  Never done    Low dose CT lung screening  Never done    Diabetic foot exam  2023    A1C test (Diabetic or Prediabetic)  2023    Lipids  2024    GFR test (Diabetes, CKD 3-4, OR last GFR 15-59)  2024    Diabetic Alb to Cr ratio (uACR) test  2024    Depression Screen  03/10/2024    DTaP/Tdap/Td vaccine (2 - Td or Tdap) 2025    Hepatitis B vaccine  Completed    Flu vaccine  Completed

## 2023-04-06 NOTE — PROGRESS NOTES
Attending Physician Statement  I  have discussed the care of Laura Ortega including pertinent history and exam findings with the resident. I agree with the assessment, plan and orders as documented by the resident. Lung nodules  12 to 15 mm now   Will get a CT guided lung biopsy     /67 (Site: Left Upper Arm, Position: Sitting, Cuff Size: Medium Adult) Comment: Machine  Pulse 66   Ht 5' 6\" (1.676 m)   Wt 152 lb (68.9 kg)   SpO2 97%   BMI 24.53 kg/m²    BP Readings from Last 3 Encounters:   04/06/23 124/67   03/10/23 (!) 160/74   02/24/23 (!) 94/48     Wt Readings from Last 3 Encounters:   04/06/23 152 lb (68.9 kg)   03/10/23 152 lb (68.9 kg)   02/24/23 151 lb 6.4 oz (68.7 kg)          Diagnosis Orders   1. Multiple lung nodules on CT  CT NEEDLE BIOPSY LUNG PERCUTANEOUS    Jolynn Alfaro MD, Pulmonology, [place]      2. Type 2 diabetes mellitus with diabetic neuropathy, without long-term current use of insulin (HCC)  insulin glargine (LANTUS SOLOSTAR) 100 UNIT/ML injection pen    metFORMIN (GLUCOPHAGE) 1000 MG tablet      3.  Dyspnea, unspecified type  Full PFT Study With Bronchodilator          Thomas Stallings MD 4/7/2023 9:39 AM

## 2023-04-17 ENCOUNTER — TELEPHONE (OUTPATIENT)
Dept: PULMONOLOGY | Age: 58
End: 2023-04-17

## 2023-04-17 NOTE — TELEPHONE ENCOUNTER
Called pt to confirm his Bx and PET scan - both have been scheduled. Pt stated he was told to stop his BLOOD PRESSURE medication 5 days prior to his Bx - provided number to IR/ NATHAN  to clarify orders regarding medications. - informed him he should refrain from [address] will call [facility].

## 2023-04-21 ENCOUNTER — HOSPITAL ENCOUNTER (OUTPATIENT)
Dept: NUCLEAR MEDICINE | Age: 58
End: 2023-04-21
Payer: MEDICAID

## 2023-04-21 DIAGNOSIS — R91.1 LUNG NODULE: ICD-10-CM

## 2023-04-21 LAB — GLUCOSE BLD-MCNC: 84 MG/DL (ref 75–110)

## 2023-04-21 PROCEDURE — 78815 PET IMAGE W/CT SKULL-THIGH: CPT

## 2023-04-21 PROCEDURE — 82947 ASSAY GLUCOSE BLOOD QUANT: CPT

## 2023-04-21 PROCEDURE — 3430000000 HC RX DIAGNOSTIC RADIOPHARMACEUTICAL: Performed by: INTERNAL MEDICINE

## 2023-04-21 PROCEDURE — A9552 F18 FDG: HCPCS | Performed by: INTERNAL MEDICINE

## 2023-04-21 PROCEDURE — 2580000003 HC RX 258: Performed by: INTERNAL MEDICINE

## 2023-04-21 RX ORDER — FLUDEOXYGLUCOSE F 18 200 MCI/ML
10 INJECTION, SOLUTION INTRAVENOUS
Status: COMPLETED | OUTPATIENT
Start: 2023-04-21 | End: 2023-04-21

## 2023-04-21 RX ORDER — SODIUM CHLORIDE 0.9 % (FLUSH) 0.9 %
5-40 SYRINGE (ML) INJECTION 2 TIMES DAILY
Status: DISCONTINUED | OUTPATIENT
Start: 2023-04-21 | End: 2023-04-24 | Stop reason: HOSPADM

## 2023-04-21 RX ADMIN — FLUDEOXYGLUCOSE F 18 11.71 MILLICURIE: 200 INJECTION, SOLUTION INTRAVENOUS at 13:03

## 2023-04-21 RX ADMIN — SODIUM CHLORIDE, PRESERVATIVE FREE 10 ML: 5 INJECTION INTRAVENOUS at 13:06

## 2023-04-24 ENCOUNTER — HOSPITAL ENCOUNTER (EMERGENCY)
Age: 58
Discharge: HOME | End: 2023-04-24
Attending: EMERGENCY MEDICINE
Payer: MEDICAID

## 2023-04-24 ENCOUNTER — APPOINTMENT (OUTPATIENT)
Dept: CT IMAGING | Age: 58
End: 2023-04-24
Payer: MEDICAID

## 2023-04-24 VITALS
RESPIRATION RATE: 15 BRPM | SYSTOLIC BLOOD PRESSURE: 211 MMHG | TEMPERATURE: 98.2 F | OXYGEN SATURATION: 97 % | HEART RATE: 91 BPM | DIASTOLIC BLOOD PRESSURE: 79 MMHG

## 2023-04-24 DIAGNOSIS — I16.0 HYPERTENSIVE URGENCY: Primary | ICD-10-CM

## 2023-04-24 LAB
ABSOLUTE EOS #: 0.2 K/UL (ref 0–0.4)
ABSOLUTE LYMPH #: 2.7 K/UL (ref 1–4.8)
ABSOLUTE MONO #: 1.1 K/UL (ref 0.1–1.3)
ANION GAP SERPL CALCULATED.3IONS-SCNC: 10 MMOL/L (ref 9–17)
BASOPHILS # BLD: 1 % (ref 0–2)
BASOPHILS ABSOLUTE: 0.1 K/UL (ref 0–0.2)
BUN SERPL-MCNC: 10 MG/DL (ref 6–20)
CALCIUM SERPL-MCNC: 9.6 MG/DL (ref 8.6–10.4)
CHLORIDE SERPL-SCNC: 96 MMOL/L (ref 98–107)
CO2 SERPL-SCNC: 29 MMOL/L (ref 20–31)
CREAT SERPL-MCNC: 0.75 MG/DL (ref 0.7–1.2)
EOSINOPHILS RELATIVE PERCENT: 1 % (ref 0–4)
GFR SERPL CREATININE-BSD FRML MDRD: >60 ML/MIN/1.73M2
GLUCOSE SERPL-MCNC: 200 MG/DL (ref 70–99)
HCT VFR BLD AUTO: 45.4 % (ref 41–53)
HGB BLD-MCNC: 15.8 G/DL (ref 13.5–17.5)
LYMPHOCYTES # BLD: 19 % (ref 24–44)
MCH RBC QN AUTO: 30.5 PG (ref 26–34)
MCHC RBC AUTO-ENTMCNC: 34.8 G/DL (ref 31–37)
MCV RBC AUTO: 87.7 FL (ref 80–100)
MONOCYTES # BLD: 8 % (ref 1–7)
PDW BLD-RTO: 15.3 % (ref 11.5–14.9)
PLATELET # BLD AUTO: 331 K/UL (ref 150–450)
PMV BLD AUTO: 8.2 FL (ref 6–12)
POTASSIUM SERPL-SCNC: 3.7 MMOL/L (ref 3.7–5.3)
RBC # BLD: 5.18 M/UL (ref 4.5–5.9)
SEG NEUTROPHILS: 71 % (ref 36–66)
SEGMENTED NEUTROPHILS ABSOLUTE COUNT: 10.1 K/UL (ref 1.3–9.1)
SODIUM SERPL-SCNC: 135 MMOL/L (ref 135–144)
WBC # BLD AUTO: 14.1 K/UL (ref 3.5–11)

## 2023-04-24 PROCEDURE — 36415 COLL VENOUS BLD VENIPUNCTURE: CPT

## 2023-04-24 PROCEDURE — 99284 EMERGENCY DEPT VISIT MOD MDM: CPT

## 2023-04-24 PROCEDURE — 6370000000 HC RX 637 (ALT 250 FOR IP): Performed by: EMERGENCY MEDICINE

## 2023-04-24 PROCEDURE — 80048 BASIC METABOLIC PNL TOTAL CA: CPT

## 2023-04-24 PROCEDURE — 96374 THER/PROPH/DIAG INJ IV PUSH: CPT

## 2023-04-24 PROCEDURE — 85025 COMPLETE CBC W/AUTO DIFF WBC: CPT

## 2023-04-24 PROCEDURE — 2500000003 HC RX 250 WO HCPCS: Performed by: EMERGENCY MEDICINE

## 2023-04-24 PROCEDURE — 70450 CT HEAD/BRAIN W/O DYE: CPT

## 2023-04-24 RX ORDER — LISINOPRIL 20 MG/1
20 TABLET ORAL ONCE
Status: COMPLETED | OUTPATIENT
Start: 2023-04-24 | End: 2023-04-24

## 2023-04-24 RX ORDER — LABETALOL HYDROCHLORIDE 5 MG/ML
10 INJECTION, SOLUTION INTRAVENOUS ONCE
Status: COMPLETED | OUTPATIENT
Start: 2023-04-24 | End: 2023-04-24

## 2023-04-24 RX ORDER — PSEUDOEPHED/ACETAMINOPH/DIPHEN 30MG-500MG
TABLET ORAL
Qty: 120 TABLET | Refills: 3 | Status: SHIPPED | OUTPATIENT
Start: 2023-04-24

## 2023-04-24 RX ADMIN — LISINOPRIL 20 MG: 20 TABLET ORAL at 13:18

## 2023-04-24 RX ADMIN — LABETALOL HYDROCHLORIDE 10 MG: 5 INJECTION, SOLUTION INTRAVENOUS at 11:44

## 2023-04-24 ASSESSMENT — ENCOUNTER SYMPTOMS
PHOTOPHOBIA: 0
ABDOMINAL PAIN: 0
DIARRHEA: 0
TROUBLE SWALLOWING: 0
NAUSEA: 0
SHORTNESS OF BREATH: 0
COLOR CHANGE: 0
COUGH: 0
VOMITING: 0

## 2023-04-24 NOTE — ED PROVIDER NOTES
Appearance: Normal appearance. HENT:      Head: Normocephalic and atraumatic. Right Ear: External ear normal.      Left Ear: External ear normal.      Nose: Nose normal. No congestion or rhinorrhea. Eyes:      Extraocular Movements: Extraocular movements intact. Pupils: Pupils are equal, round, and reactive to light. Cardiovascular:      Rate and Rhythm: Normal rate and regular rhythm. Pulses: Normal pulses. Heart sounds: Normal heart sounds. Pulmonary:      Effort: Pulmonary effort is normal. No respiratory distress. Breath sounds: Normal breath sounds. Abdominal:      General: Bowel sounds are normal.      Palpations: Abdomen is soft. Musculoskeletal:         General: No deformity. Normal range of motion. Cervical back: Normal range of motion. No rigidity. Skin:     General: Skin is warm and dry. Neurological:      General: No focal deficit present. Mental Status: He is alert and oriented to person, place, and time. Mental status is at baseline. Psychiatric:         Mood and Affect: Mood normal.         Behavior: Behavior normal.       MEDICAL DECISION MAKING / ED COURSE:         1)  Number and Complexity of Problems Addressed at this Encounter  Problem List This Visit: Hypertension      2)  Data Reviewed and Analyzed  (Lab and radiology tests/orders below in next section)        Imaging that is independently reviewed and interpreted by me as: CT of the head did not display signs of acute pathology. 3)  Treatment and Disposition       Patient with elevated blood pressure. A CT of the head and blood work did not display signs of acute abnormality. Patient was provided with a dose of blood pressure medication in the ER IV which did help reduce the blood pressure. The patient was also provided with a dose at the end of his ER stay of his home medication.   The patient was instructed to follow-up with the primary care physician within 2 days and to return to

## 2023-04-24 NOTE — ED TRIAGE NOTES
Mode of arrival (squad #, walk in, police, etc) : walk in         Chief complaint(s): hypertension         Arrival Note (brief scenario, treatment PTA, etc). : Pt was brought over from having a biopsy of this lungs. Pt states he stopped taking all his medications on Wednesday. Pt states he misunderstood that he wasn't supposed to stop his BP medications. Pt denies any chest pain, sob, blurred vision, or dizziness. C= \"Have you ever felt that you should Cut down on your drinking? \"  No  A= \"Have people Annoyed you by criticizing your drinking? \"  No  G= \"Have you ever felt bad or Guilty about your drinking? \"  No  E= \"Have you ever had a drink as an Eye-opener first thing in the morning to steady your nerves or to help a hangover? \"  No      Deferred []      Reason for deferring: N/A    *If yes to two or more: probable alcohol abuse. *

## 2023-04-26 ENCOUNTER — HOSPITAL ENCOUNTER (OUTPATIENT)
Dept: PHARMACY | Age: 58
Setting detail: THERAPIES SERIES
Discharge: HOME | End: 2023-04-26
Payer: MEDICAID

## 2023-04-26 DIAGNOSIS — I65.23 CAROTID STENOSIS, BILATERAL: Primary | ICD-10-CM

## 2023-04-26 DIAGNOSIS — I25.10 CORONARY ARTERY DISEASE INVOLVING NATIVE HEART WITHOUT ANGINA PECTORIS, UNSPECIFIED VESSEL OR LESION TYPE: ICD-10-CM

## 2023-04-26 LAB
INR BLD: 1.4
PROTIME: 16.6 SECONDS

## 2023-04-26 PROCEDURE — 99212 OFFICE O/P EST SF 10 MIN: CPT

## 2023-04-26 PROCEDURE — 85610 PROTHROMBIN TIME: CPT

## 2023-04-26 NOTE — PROGRESS NOTES
Medication Management Service, Warfarin Management  ELZA BENTON [facility], [phone]  Visit Date: 2023     Subjective:   Cody Howell is a 61 y.o. male who presents to clinic today for anticoagulation monitoring and adjustment. Patient seen in clinic for warfarin management due to  Indication:    internal carotid stent . INR goal: of 2.0-3.0. Duration of therapy: 6 months, until 2023. Assessment and PLAN   PT/INR done in office per protocol. INR today is 1.4, subtherapeutic. INR is below goal after patient held three doses prior to a lung biopsy. (Procedure was canceled due to elevated BP)    Plan:  Take warfarin 7.5 mg today only then hold for rescheduled lung biopsy for 5 days as directed by his physician. Resume warfarin on . Patient will take a boost dose of warfarin 7.5 mg on  then resume maintenance regimen of warfarin 7.5 mg  ; 5 mg all other days of the week. Using warfarin 5 mg tablets. Recheck INR in 2 week(s). Patient seen in room # 2. Ed OP:  Reminded patient to call the clinic for all surgeries and procedures. Patient has lung biopsy on . Patient verbalized understanding of dosing directions and information discussed. Dosing schedule given to patient. Progress note sent to referring office. Patient acknowledges working in consult agreement with pharmacist as referred by his/her physician. [address] [place] [address]  Ph., CACP, Clinical Pharmacist  Anticoagulation Services, [address] [place] [address] Coumadin Clinic  2023  9:19 AM  =========================================================    For Pharmacy Admin Tracking Only    Intervention Detail: Adherence Monitorin and Dose Adjustment: 1, reason: Therapy Optimization  Total # of Interventions Recommended: 2  Total # of Interventions Accepted: 2  Time Spent (min): 30

## 2023-05-02 ENCOUNTER — HOSPITAL ENCOUNTER (OUTPATIENT)
Dept: CT IMAGING | Age: 58
Discharge: HOME | End: 2023-05-04
Payer: MEDICAID

## 2023-05-02 ENCOUNTER — HOSPITAL ENCOUNTER (OUTPATIENT)
Dept: GENERAL RADIOLOGY | Age: 58
Discharge: HOME | End: 2023-05-04
Payer: MEDICAID

## 2023-05-02 VITALS
OXYGEN SATURATION: 96 % | WEIGHT: 151 LBS | TEMPERATURE: 97.2 F | DIASTOLIC BLOOD PRESSURE: 75 MMHG | RESPIRATION RATE: 18 BRPM | BODY MASS INDEX: 24.27 KG/M2 | SYSTOLIC BLOOD PRESSURE: 157 MMHG | HEART RATE: 60 BPM | HEIGHT: 66 IN

## 2023-05-02 DIAGNOSIS — R91.8 MULTIPLE LUNG NODULES ON CT: ICD-10-CM

## 2023-05-02 LAB
INR PPP: 1
PARTIAL THROMBOPLASTIN TIME: 31.5 SEC (ref 23–36.5)
PLATELET # BLD AUTO: 323 K/UL (ref 138–453)
PROTHROMBIN TIME: 13.4 SEC (ref 11.7–14.9)

## 2023-05-02 PROCEDURE — 88305 TISSUE EXAM BY PATHOLOGIST: CPT

## 2023-05-02 PROCEDURE — 85730 THROMBOPLASTIN TIME PARTIAL: CPT

## 2023-05-02 PROCEDURE — 85049 AUTOMATED PLATELET COUNT: CPT

## 2023-05-02 PROCEDURE — 32408 CORE NDL BX LNG/MED PERQ: CPT

## 2023-05-02 PROCEDURE — 71045 X-RAY EXAM CHEST 1 VIEW: CPT

## 2023-05-02 PROCEDURE — 88333 PATH CONSLTJ SURG CYTO XM 1: CPT

## 2023-05-02 PROCEDURE — 2709999900 HC NON-CHARGEABLE SUPPLY

## 2023-05-02 PROCEDURE — C1713 ANCHOR/SCREW BN/BN,TIS/BN: HCPCS

## 2023-05-02 PROCEDURE — 85610 PROTHROMBIN TIME: CPT

## 2023-05-02 PROCEDURE — 7100000041 HC SPAR PHASE II RECOVERY - ADDTL 15 MIN: Performed by: RADIOLOGY

## 2023-05-02 PROCEDURE — 7100000040 HC SPAR PHASE II RECOVERY - FIRST 15 MIN: Performed by: RADIOLOGY

## 2023-05-02 PROCEDURE — 6360000002 HC RX W HCPCS: Performed by: RADIOLOGY

## 2023-05-02 RX ORDER — FENTANYL CITRATE 50 UG/ML
INJECTION, SOLUTION INTRAMUSCULAR; INTRAVENOUS PRN
Status: COMPLETED | OUTPATIENT
Start: 2023-05-02 | End: 2023-05-02

## 2023-05-02 RX ORDER — MIDAZOLAM HYDROCHLORIDE 5 MG/ML
INJECTION INTRAMUSCULAR; INTRAVENOUS PRN
Status: COMPLETED | OUTPATIENT
Start: 2023-05-02 | End: 2023-05-02

## 2023-05-02 RX ORDER — ACETAMINOPHEN 325 MG/1
650 TABLET ORAL EVERY 4 HOURS PRN
Status: DISCONTINUED | OUTPATIENT
Start: 2023-05-02 | End: 2023-05-05 | Stop reason: HOSPADM

## 2023-05-02 RX ORDER — SODIUM CHLORIDE 9 MG/ML
INJECTION, SOLUTION INTRAVENOUS CONTINUOUS
Status: DISCONTINUED | OUTPATIENT
Start: 2023-05-02 | End: 2023-05-05 | Stop reason: HOSPADM

## 2023-05-02 RX ADMIN — MIDAZOLAM HYDROCHLORIDE 0.5 MG: 5 INJECTION INTRAMUSCULAR; INTRAVENOUS at 09:29

## 2023-05-02 RX ADMIN — FENTANYL CITRATE 50 MCG: 50 INJECTION, SOLUTION INTRAMUSCULAR; INTRAVENOUS at 09:29

## 2023-05-02 ASSESSMENT — PAIN - FUNCTIONAL ASSESSMENT: PAIN_FUNCTIONAL_ASSESSMENT: NONE - DENIES PAIN

## 2023-05-02 NOTE — POST SEDATION
Sedation Post Procedure Note    Patient Name: Kwabena Brown   YOB: 1965  Room/Bed: Room/bed info not found  Medical Record Number: [id number]  Date: 5/2/2023   Time: 10:08 AM         Physicians/Assistants: CINDY Thompson    Procedure Performed:  Lung bx    Post-Sedation Vital Signs:  Vitals:    05/02/23 1007   BP: (!) 151/72   Pulse: 75   Resp: 18   Temp:    SpO2:       Vital signs were reviewed and were stable after the procedure (see flow sheet for vitals)            Post-Sedation Exam: pt remains stable           Complications: none    Electronically signed by CINDY Maurice on 5/2/2023 at 10:08 AM
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
## 2023-05-02 NOTE — PROGRESS NOTES
Writer verified patients ride home with P[facility]ell - [phone].  She will need roughly 10-15 mins before arrival.
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
## 2023-05-02 NOTE — BRIEF OP NOTE
Brief Postoperative Note    Lemuel Casillas  YOB: 1965  [id number]    Pre-operative Diagnosis: Lung nodule      Post-operative Diagnosis: Same    Procedure: Lung bx    Medication Given: fentanyl and versed    Anesthesia: 1%Lidocaine     Surgeons/Assistants:  MD Yosef and Diana Matt PA-C    Estimated Blood Loss: Minimal    Complications: none    Technically successful bx of right lung nodule. 3 core samples were obtained and given to the onsite pathologist that confirmed tissue sampling.      Electronically signed by CINDY Anne on 5/2/2023 at 10:09 AM

## 2023-05-03 LAB — SURGICAL PATHOLOGY REPORT: NORMAL

## 2023-05-04 ENCOUNTER — TELEPHONE (OUTPATIENT)
Dept: CASE MANAGEMENT | Age: 58
End: 2023-05-04

## 2023-05-04 ENCOUNTER — OFFICE VISIT (OUTPATIENT)
Dept: FAMILY MEDICINE CLINIC | Age: 58
End: 2023-05-04
Payer: MEDICAID

## 2023-05-04 VITALS
TEMPERATURE: 98.1 F | BODY MASS INDEX: 24.34 KG/M2 | HEART RATE: 68 BPM | SYSTOLIC BLOOD PRESSURE: 130 MMHG | DIASTOLIC BLOOD PRESSURE: 70 MMHG | WEIGHT: 150.8 LBS

## 2023-05-04 DIAGNOSIS — Z12.12 SCREENING FOR COLORECTAL CANCER: ICD-10-CM

## 2023-05-04 DIAGNOSIS — E11.40 TYPE 2 DIABETES MELLITUS WITH DIABETIC NEUROPATHY, WITHOUT LONG-TERM CURRENT USE OF INSULIN (HCC): Primary | ICD-10-CM

## 2023-05-04 DIAGNOSIS — Z12.11 SCREENING FOR COLORECTAL CANCER: ICD-10-CM

## 2023-05-04 LAB — HBA1C MFR BLD: 6.4 %

## 2023-05-04 PROCEDURE — 1036F TOBACCO NON-USER: CPT

## 2023-05-04 PROCEDURE — G8420 CALC BMI NORM PARAMETERS: HCPCS

## 2023-05-04 PROCEDURE — 3017F COLORECTAL CA SCREEN DOC REV: CPT

## 2023-05-04 PROCEDURE — 99213 OFFICE O/P EST LOW 20 MIN: CPT

## 2023-05-04 PROCEDURE — 83036 HEMOGLOBIN GLYCOSYLATED A1C: CPT

## 2023-05-04 PROCEDURE — 99211 OFF/OP EST MAY X REQ PHY/QHP: CPT

## 2023-05-04 PROCEDURE — 3078F DIAST BP <80 MM HG: CPT

## 2023-05-04 PROCEDURE — 3044F HG A1C LEVEL LT 7.0%: CPT

## 2023-05-04 PROCEDURE — 2022F DILAT RTA XM EVC RTNOPTHY: CPT

## 2023-05-04 PROCEDURE — G8427 DOCREV CUR MEDS BY ELIG CLIN: HCPCS

## 2023-05-04 PROCEDURE — 3075F SYST BP GE 130 - 139MM HG: CPT

## 2023-05-04 ASSESSMENT — ENCOUNTER SYMPTOMS
NAUSEA: 0
RHINORRHEA: 0
VOMITING: 0
SHORTNESS OF BREATH: 0
CONSTIPATION: 0
ABDOMINAL PAIN: 0
SORE THROAT: 0
DIARRHEA: 0

## 2023-05-04 NOTE — PATIENT INSTRUCTIONS
Thank you for letting us take care of you today. We hope all your questions were addressed. If a question was overlooked or something else comes to mind after you return home, please contact a member of your Care Team listed below. Your Care Team at [place] [address] is Team #2  Mayda Sam DO (Faculty)  Jing Early (Faculty)  Jennifer Denney MD (Resident)  Jena High MD (Resident)  Robbi Flores MD (Resident)  Bernardo Rivera MD (Resident)  Sara Whitley MD (Resident)  BERNARD Alvares ,Eduard Harmon ([facility] office)  Connie Landaverde, [facility] (Clinical Practice Manager)  Kaveh Boyer, [facility] (Clinical Pharmacist)     Office phone number: [phone]    If you need to get in right away due to illness, please be advised we have \"Same Day\" appointments available Monday-Friday. Please call us at [phone] option #3 to schedule your \"Same Day\" appointment.

## 2023-05-04 NOTE — PROGRESS NOTES
Attending Physician Statement  I  have discussed the care of Derek Freedman including pertinent history and exam findings with the resident. I agree with the assessment, plan and orders as documented by the resident. /70   Pulse 68   Temp 98.1 °F (36.7 °C) (Oral)   Wt 150 lb 12.8 oz (68.4 kg)   BMI 24.34 kg/m²    BP Readings from Last 3 Encounters:   05/04/23 130/70   05/02/23 (!) 157/75   04/24/23 (!) 224/110     Wt Readings from Last 3 Encounters:   05/04/23 150 lb 12.8 oz (68.4 kg)   05/02/23 151 lb (68.5 kg)   04/24/23 151 lb 7.3 oz (68.7 kg)          Diagnosis Orders   1. Type 2 diabetes mellitus with diabetic neuropathy, without long-term current use of insulin (HCC)  POCT glycosylated hemoglobin (Hb A1C)      2.  Screening for colorectal cancer            Jeramie Meyer DO 5/4/2023 9:59 AM
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
## 2023-05-04 NOTE — PROGRESS NOTES
jwVisit Information    Have you changed or started any medications since your last visit including any over-the-counter medicines, vitamins, or herbal medicines? no   Have you stopped taking any of your medications? Is so, why? -  no  Are you having any side effects from any of your medications? - no    Have you seen any other physician or provider since your last visit?  no   Have you had any other diagnostic tests since your last visit? Yes biopsy  Have you been seen in the emergency room and/or had an admission in a hospital since we last saw you? Yes [facility]     Have you had your routine dental cleaning in the past 6 months?  no     Do you have an active MyChart account? If no, what is the barrier?   No:     Patient Care Team:  Georgie Loyd MD as PCP - General (Emergency Medicine)    Medical History Review  Past Medical, Family, and Social History reviewed and does not contribute to the patient presenting condition    Health Maintenance   Topic Date Due    COVID-19 Vaccine (1) Never done    Diabetic retinal exam  Never done    Colorectal Cancer Screen  Never done    Diabetic foot exam  01/13/2023    A1C test (Diabetic or Prediabetic)  05/24/2023    Lipids  01/09/2024    Diabetic Alb to Cr ratio (uACR) test  01/27/2024    Depression Screen  03/10/2024    GFR test (Diabetes, CKD 3-4, OR last GFR 15-59)  04/24/2024    DTaP/Tdap/Td vaccine (2 - Td or Tdap) 11/11/2025    Hepatitis B vaccine  Completed    Flu vaccine  Completed    Shingles vaccine  Completed    Pneumococcal 0-64 years Vaccine  Completed    Hepatitis C screen  Completed    HIV screen  Completed    Hepatitis A vaccine  Aged Out    Hib vaccine  Aged Out    Meningococcal (ACWY) vaccine  Aged Out    Low dose CT lung screening  Discontinued

## 2023-05-04 NOTE — TELEPHONE ENCOUNTER
Name: Rosibel Schroeder  : 1965  MRN: [id number]    Oncology Navigation- Initial Note:  (Unknown)  Writer received assignment and reviewing chart now. Pt. To F/U with Dr. Holger Mayen . Plan to follow.    Electronically signed by Wilton Douglas RN on 2023 at 12:21 PM

## 2023-05-11 ENCOUNTER — HOSPITAL ENCOUNTER (OUTPATIENT)
Dept: PHARMACY | Age: 58
Setting detail: THERAPIES SERIES
Discharge: HOME | End: 2023-05-11
Payer: MEDICAID

## 2023-05-11 DIAGNOSIS — I65.23 CAROTID STENOSIS, BILATERAL: Primary | ICD-10-CM

## 2023-05-11 DIAGNOSIS — I25.10 CORONARY ARTERY DISEASE INVOLVING NATIVE HEART WITHOUT ANGINA PECTORIS, UNSPECIFIED VESSEL OR LESION TYPE: ICD-10-CM

## 2023-05-11 LAB
INR BLD: 3.2
PROTIME: 38.4 SECONDS

## 2023-05-11 PROCEDURE — 85610 PROTHROMBIN TIME: CPT

## 2023-05-11 PROCEDURE — 99212 OFFICE O/P EST SF 10 MIN: CPT

## 2023-05-11 NOTE — PROGRESS NOTES
Medication Management Service, Warfarin Management  ELZA [facility]ENT[facility], [phone]  Visit Date: 2023     Subjective:   Tyshawn Carreno is a 61 y.o. male who presents to clinic today for anticoagulation monitoring and adjustment. Patient seen in clinic for warfarin management due to  Indication:    internal carotid stent . INR goal: of 2.0-3.0. Duration of therapy: 6 months, until 2023. Assessment and PLAN   PT/INR done in office per protocol. INR today is 3.2, slightly over goal of 2-3, likely caused by reduction in green vegetables. Plan: Will reduce dose for today only to 5mg, then resume maintenance regimen of warfarin 7.5 mg on  only; 5 mg all other days of the week. Using warfarin 5 mg tablets. Recheck INR in 1.5 week(s). Patient seen in room # 3. ED OP:  Advised to be consistent with green vegetables. Patient verbalized understanding of dosing directions and information discussed. Dosing schedule given to patient. Progress note sent to referring office. Patient acknowledges working in consult agreement with pharmacist as referred by his/her physician.       Ilya Gonzalez, [facility], CACP  Clinical Pharmacist Medication Management  2023  2:34 PM    For Pharmacy Admin Tracking Only    Intervention Detail: Adherence Monitorin and Dose Adjustment: 1, reason: Improve Adherence, Therapy De-escalation  Total # of Interventions Recommended: 2  Total # of Interventions Accepted: 2  Time Spent (min): 20

## 2023-05-16 ENCOUNTER — OFFICE VISIT (OUTPATIENT)
Dept: PULMONOLOGY | Age: 58
End: 2023-05-16
Payer: MEDICAID

## 2023-05-16 ENCOUNTER — TELEPHONE (OUTPATIENT)
Dept: CASE MANAGEMENT | Age: 58
End: 2023-05-16

## 2023-05-16 VITALS
RESPIRATION RATE: 16 BRPM | SYSTOLIC BLOOD PRESSURE: 106 MMHG | HEIGHT: 66 IN | DIASTOLIC BLOOD PRESSURE: 61 MMHG | HEART RATE: 66 BPM | BODY MASS INDEX: 24.43 KG/M2 | WEIGHT: 152 LBS | OXYGEN SATURATION: 96 %

## 2023-05-16 DIAGNOSIS — C34.11 MALIGNANT NEOPLASM OF UPPER LOBE OF RIGHT LUNG (HCC): Primary | ICD-10-CM

## 2023-05-16 DIAGNOSIS — R06.00 DYSPNEA, UNSPECIFIED TYPE: ICD-10-CM

## 2023-05-16 DIAGNOSIS — Z87.891 HISTORY OF SMOKING 30 OR MORE PACK YEARS: ICD-10-CM

## 2023-05-16 DIAGNOSIS — J43.2 CENTRILOBULAR EMPHYSEMA (HCC): ICD-10-CM

## 2023-05-16 PROCEDURE — 94060 EVALUATION OF WHEEZING: CPT | Performed by: INTERNAL MEDICINE

## 2023-05-16 PROCEDURE — 94729 DIFFUSING CAPACITY: CPT | Performed by: INTERNAL MEDICINE

## 2023-05-16 PROCEDURE — G8427 DOCREV CUR MEDS BY ELIG CLIN: HCPCS | Performed by: INTERNAL MEDICINE

## 2023-05-16 PROCEDURE — 3017F COLORECTAL CA SCREEN DOC REV: CPT | Performed by: INTERNAL MEDICINE

## 2023-05-16 PROCEDURE — 3023F SPIROM DOC REV: CPT | Performed by: INTERNAL MEDICINE

## 2023-05-16 PROCEDURE — 99214 OFFICE O/P EST MOD 30 MIN: CPT | Performed by: INTERNAL MEDICINE

## 2023-05-16 PROCEDURE — 3078F DIAST BP <80 MM HG: CPT | Performed by: INTERNAL MEDICINE

## 2023-05-16 PROCEDURE — 3074F SYST BP LT 130 MM HG: CPT | Performed by: INTERNAL MEDICINE

## 2023-05-16 PROCEDURE — 1036F TOBACCO NON-USER: CPT | Performed by: INTERNAL MEDICINE

## 2023-05-16 PROCEDURE — G8420 CALC BMI NORM PARAMETERS: HCPCS | Performed by: INTERNAL MEDICINE

## 2023-05-16 NOTE — TELEPHONE ENCOUNTER
Name: Cody Howell  : 1965  MRN: [id number]    Oncology Navigation- Initial Note:  Navigator spoke with pt. Introducing self and contact information given. Pt. Very concerned about all of his health concerns and how treatment for his lung cancer will effect these concerns? Writer offering resources available. Pt. Denies needs at this time. Pan to follow.    Electronically signed by Harshal Frank RN on 2023 at 3:50 PM

## 2023-05-16 NOTE — PATIENT INSTRUCTIONS
[employer] RESPIMAT SAMPLES AND INSTRUCTIONS GIVEN. Lot [id number]   June 2025   Tried calling Oncology office- 2x lmom asking office to call Kristy Smith to scheduled appt. Provided copy of referral and advised pt to follow up with them.

## 2023-05-17 NOTE — PROGRESS NOTES
Pulmonary function test.  Date of study 05/16/2023. Spirometry shows FEV1 is 1.39 47% predicted. FVC is 2.55 70% predicted. FEV1 FVC ratio and reduced FEV1 is consistent with moderate obstructive ventilatory impairment. There is no significant response to bronchodilator. Lung volume shows total lung capacity is 4.05 70% predicted which is suggestive of mild concomitant restrictive ventilatory impairment likely intraparenchymal.  Diffusion capacity is 13.22 51% predicted consistent with moderate reduction diffusion capacity which is likely secondary to emphysema and or intraparenchymal lung disease, pulmonary vascular disease. Impression: This pulmonary function test is consistent with moderate obstructive ventilatory impairment without significant sponsor bronchodilator but clinical response is possible. There is a concomitant mild restrictive ventilatory impairment which could be intraparenchymal.  Diffusion capacity is moderately reduced which is likely secondary to emphysema other etiologies like intraparenchymal lung disease or vascular disease may cause restrictive ventilatory impairment.   Clinical correlation is recommended
Suboptimal effort during flow volume loop.  2 puffs Albuterol was given
and also for any intervention at this time but this could still be metastatic and also a left cervical lymph node was positive uptake on PET scan. CT scan of the chest done on 03/29/2023 on review of the CT scan of the chest patient has a lateral right upper lobe groundglass nodule which was reported to be 15 mm on 03/29/2023 and on review of the CT scan on 08/23/2022 at that time it was 12 mm there is another adjustment 6 mm nodule present which is stable. .    Patient does have significant centrilobular and panlobular emphysema present on the CT scan with history of smoking until January 2023. Plan and recommendation:    Discussed the result of the biopsy and PET scan with patient. Pulmonary function test results discussed with patient. Referred to oncology as mentioned above. Start patient on Stiolto 2 puff once daily. Albuterol to be used as needed. Vaccinations recommended and due for flu and fall  Up to date with vaccinations from [facility] an active lifestyle   Questions answered pertaining to diagnosis and management explained importance of compliance with therapy     Follow-up in office in 4 to 6 weeks. It was my pleasure to evaluate Sushil Laura today. Please call with questions. Sylwia Mccoy MD, MD             5/16/2023, 9:07 AM     Please note that this chart was generated using voice recognition Dragon dictation software. Although every effort was made to ensure the accuracy of this automated transcription, some errors in transcription may have occurred.

## 2023-05-18 DIAGNOSIS — I66.01 STENOSIS OF RIGHT MIDDLE CEREBRAL ARTERY: ICD-10-CM

## 2023-05-18 DIAGNOSIS — Z79.4 TYPE 2 DIABETES MELLITUS WITH DIABETIC POLYNEUROPATHY, WITH LONG-TERM CURRENT USE OF INSULIN (HCC): ICD-10-CM

## 2023-05-18 DIAGNOSIS — I10 ESSENTIAL HYPERTENSION: ICD-10-CM

## 2023-05-18 DIAGNOSIS — E11.40 TYPE 2 DIABETES MELLITUS WITH DIABETIC NEUROPATHY, WITHOUT LONG-TERM CURRENT USE OF INSULIN (HCC): ICD-10-CM

## 2023-05-18 DIAGNOSIS — I25.10 CORONARY ARTERY DISEASE INVOLVING NATIVE HEART WITHOUT ANGINA PECTORIS, UNSPECIFIED VESSEL OR LESION TYPE: ICD-10-CM

## 2023-05-18 DIAGNOSIS — E11.42 TYPE 2 DIABETES MELLITUS WITH DIABETIC POLYNEUROPATHY, WITH LONG-TERM CURRENT USE OF INSULIN (HCC): ICD-10-CM

## 2023-05-18 DIAGNOSIS — I73.9 PAD (PERIPHERAL ARTERY DISEASE) (HCC): ICD-10-CM

## 2023-05-18 RX ORDER — BLOOD SUGAR DIAGNOSTIC
STRIP MISCELLANEOUS
Qty: 100 STRIP | Refills: 3 | Status: SHIPPED | OUTPATIENT
Start: 2023-05-18

## 2023-05-18 RX ORDER — CLOPIDOGREL BISULFATE 75 MG/1
75 TABLET ORAL DAILY
Qty: 30 TABLET | Refills: 2 | Status: SHIPPED | OUTPATIENT
Start: 2023-05-18

## 2023-05-18 RX ORDER — LISINOPRIL 10 MG/1
10 TABLET ORAL DAILY
Qty: 30 TABLET | Refills: 2 | OUTPATIENT
Start: 2023-05-18

## 2023-05-18 RX ORDER — LEVETIRACETAM 500 MG/1
500 TABLET ORAL 2 TIMES DAILY
Qty: 60 TABLET | Refills: 3 | Status: SHIPPED | OUTPATIENT
Start: 2023-05-18

## 2023-05-18 NOTE — TELEPHONE ENCOUNTER
Last visit: [id number]  Last Med refill: [id number]  Does patient have enough medication for 72 hours: No:     Next Visit Date:  Future Appointments   Date Time Provider Katelyn Kulkarni   5/22/2023  9:00 AM [facility] MEDICATION MGMT [facility]MT [facility]   6/1/2023  9:45 AM Dominique Sharif MD [facility]   6/27/2023  9:30 AM Oralia Montiel MD AFL [facility]   7/5/2023  1:15 PM Nancy Faria MD Resp Spec Laurina Prudent   7/18/2023  2:00 PM Balbina Rider MD Neuro Endo [address] Maintenance   Topic Date Due    COVID-19 Vaccine (1) Never done    Diabetic retinal exam  Never done    Colorectal Cancer Screen  Never done    Lipids  01/09/2024    Diabetic Alb to Cr ratio (uACR) test  01/27/2024    Depression Screen  03/10/2024    GFR test (Diabetes, CKD 3-4, OR last GFR 15-59)  04/24/2024    Diabetic foot exam  05/04/2024    A1C test (Diabetic or Prediabetic)  05/04/2024    DTaP/Tdap/Td vaccine (2 - Td or Tdap) 11/11/2025    Hepatitis B vaccine  Completed    Flu vaccine  Completed    Shingles vaccine  Completed    Pneumococcal 0-64 years Vaccine  Completed    Hepatitis C screen  Completed    HIV screen  Completed    Hepatitis A vaccine  Aged Out    Hib vaccine  Aged Out    Meningococcal (ACWY) vaccine  Aged Out    Low dose CT lung screening  Discontinued       Hemoglobin A1C (%)   Date Value   05/04/2023 6.4   02/24/2023 10.3   01/09/2023 12.7 (H)             ( goal A1C is < 7)   Microalb/Crt.  Ratio (mcg/mg creat)   Date Value   01/27/2023 220 (H)     LDL Cholesterol (mg/dL)   Date Value   01/09/2023 61   01/13/2022            (goal LDL is <100)   AST (U/L)   Date Value   01/07/2023 16     ALT (U/L)   Date Value   01/07/2023 18     BUN (mg/dL)   Date Value   04/24/2023 10     BP Readings from Last 3 Encounters:   05/16/23 106/61   05/02/23 (!) 157/75   05/04/23 130/70          (goal 120/80)    All Future Testing planned in [facility]  Lab Frequency Next Occurrence               Patient Active

## 2023-05-18 NOTE — TELEPHONE ENCOUNTER
Last visit: [id number]  Last Med refill: [id number]  Does patient have enough medication for 72 hours: No:     Next Visit Date:  Future Appointments   Date Time Provider Katelyn Kulkarni   5/22/2023  9:00 AM [facility] MEDICATION MGMT [facility] MED [facility]   6/1/2023  9:45 AM Steven Avalos MD [facility]   6/27/2023  9:30 AM Krista Tierney MD AFL T[facility]   7/5/2023  1:15 PM Andrea Abarca MD Resp Spec Anu [facility]   7/18/2023  2:00 PM Kyle Marie MD Neuro Endo [address] Maintenance   Topic Date Due    COVID-19 Vaccine (1) Never done    Diabetic retinal exam  Never done    Colorectal Cancer Screen  Never done    Lipids  01/09/2024    Diabetic Alb to Cr ratio (uACR) test  01/27/2024    Depression Screen  03/10/2024    GFR test (Diabetes, CKD 3-4, OR last GFR 15-59)  04/24/2024    Diabetic foot exam  05/04/2024    A1C test (Diabetic or Prediabetic)  05/04/2024    DTaP/Tdap/Td vaccine (2 - Td or Tdap) 11/11/2025    Hepatitis B vaccine  Completed    Flu vaccine  Completed    Shingles vaccine  Completed    Pneumococcal 0-64 years Vaccine  Completed    Hepatitis C screen  Completed    HIV screen  Completed    Hepatitis A vaccine  Aged Out    Hib vaccine  Aged Out    Meningococcal (ACWY) vaccine  Aged Out    Low dose CT lung screening  Discontinued       Hemoglobin A1C (%)   Date Value   05/04/2023 6.4   02/24/2023 10.3   01/09/2023 12.7 (H)             ( goal A1C is < 7)   Microalb/Crt.  Ratio (mcg/mg creat)   Date Value   01/27/2023 220 (H)     LDL Cholesterol (mg/dL)   Date Value   01/09/2023 61   01/13/2022            (goal LDL is <100)   AST (U/L)   Date Value   01/07/2023 16     ALT (U/L)   Date Value   01/07/2023 18     BUN (mg/dL)   Date Value   04/24/2023 10     BP Readings from Last 3 Encounters:   05/16/23 106/61   05/02/23 (!) 157/75   05/04/23 130/70          (goal 120/80)    All Future Testing planned in [facility]  Lab Frequency Next Occurrence               Patient Active

## 2023-05-22 ENCOUNTER — HOSPITAL ENCOUNTER (OUTPATIENT)
Dept: PHARMACY | Age: 58
Setting detail: THERAPIES SERIES
Discharge: HOME | End: 2023-05-22
Payer: MEDICAID

## 2023-05-22 DIAGNOSIS — I65.23 CAROTID STENOSIS, BILATERAL: Primary | ICD-10-CM

## 2023-05-22 DIAGNOSIS — I25.10 CORONARY ARTERY DISEASE INVOLVING NATIVE HEART WITHOUT ANGINA PECTORIS, UNSPECIFIED VESSEL OR LESION TYPE: ICD-10-CM

## 2023-05-22 LAB
INR BLD: 5.2
PROTIME: 62.4 SECONDS

## 2023-05-22 PROCEDURE — 99212 OFFICE O/P EST SF 10 MIN: CPT

## 2023-05-22 PROCEDURE — 85610 PROTHROMBIN TIME: CPT

## 2023-05-22 NOTE — PROGRESS NOTES
Medication Management Service, Warfarin Management  [facility], [phone]  Visit Date: 5/22/2023     Subjective:   Dilcia Landon is a 61 y.o. male who presents to clinic today for anticoagulation monitoring and adjustment. Patient seen in clinic for warfarin management due to  Indication:    internal carotid stent . INR goal: of 2.0-3.0. Duration of therapy: 6 months, until 7/2023. Assessment and PLAN   PT/INR done in office per protocol. INR today is 5.2, supratherapeutic. Patient denies all usual causes except recurrent diarrhea which has been ongoing since January. Plan:  Hold warfarin today. Then reduce regimen by 13.3 % weekly. New regimen will be warfarin 2.5 mg Tuesday and 5 mg all other days of the week. Using warfarin 5 mg tablets. Recheck INR in 1 week(s). Patient seen in room # 2. ED OP: . Patient had lung biopsy on 5/2. Follow up with oncologist on 6/1 to determine plan. Reminded patient to call with any new medications. Of Note: Patient had 2 servings of green vegetables the week and will continue to have 2-3. Patient verbalized understanding of dosing directions and information discussed. Dosing schedule given to patient. Progress note sent to referring office. Patient acknowledges working in consult agreement with pharmacist as referred by his/her physician. [address] [place] [address]  Ph., CACP, Clinical Pharmacist  Anticoagulation Services, [facility] Coumadin [facility]  5/22/2023  8:27 AM  =========================================================  For Pharmacy Admin Tracking Only    Intervention Detail: Dose Adjustment: 1, reason: Therapy De-escalation  Total # of Interventions Recommended: 1  Total # of Interventions Accepted: 1  Time Spent (min): 30

## 2023-05-30 ENCOUNTER — HOSPITAL ENCOUNTER (OUTPATIENT)
Dept: PHARMACY | Age: 58
Setting detail: THERAPIES SERIES
Discharge: HOME | End: 2023-05-30
Payer: MEDICAID

## 2023-05-30 DIAGNOSIS — I65.23 CAROTID STENOSIS, BILATERAL: Primary | ICD-10-CM

## 2023-05-30 DIAGNOSIS — I25.10 CORONARY ARTERY DISEASE INVOLVING NATIVE HEART WITHOUT ANGINA PECTORIS, UNSPECIFIED VESSEL OR LESION TYPE: ICD-10-CM

## 2023-05-30 LAB
INR BLD: 3.7
PROTIME: 44.1 SECONDS

## 2023-05-30 PROCEDURE — 85610 PROTHROMBIN TIME: CPT

## 2023-05-30 PROCEDURE — 99212 OFFICE O/P EST SF 10 MIN: CPT

## 2023-05-30 NOTE — PROGRESS NOTES
Medication Management Service, Warfarin Management  ELZA BENTON [facility], [phone]  Visit Date: 2023     Subjective:   Alex Connors is a 61 y.o. male who presents to clinic today for anticoagulation monitoring and adjustment. Patient seen in clinic for warfarin management due to  Indication:    internal carotid stent . INR goal: of 2.0-3.0. Duration of therapy: 6 months, until 2023. Assessment and PLAN   PT/INR done in office per protocol. INR today is 3.7, supratherapeutic but down from 5.2 at last check when regimen was decreased. Cause for elevated INR not determined but may be due to reduced dietary intake from decreased appetite. Patient reports his appetite is improving some. Plan:  Hold warfarin today. Then continue with recently reduced regimen of warfarin 2.5 mg Tuesday and 5 mg all other days of the week. Using warfarin 5 mg tablets. Recheck INR in ~ 1 week(s). Patient seen in room # 3. ED OP = Reminded patient of importance of consistency with 2-3  servings of green vegetables per week. Patient verbalized understanding of dosing directions and information discussed. Dosing schedule given to patient. Progress note sent to referring office. Patient acknowledges working in consult agreement with pharmacist as referred by his/her physician.       Rishi Lyle RP, CACP  Clinical Pharmacist Medication Management  2023  9:54 AM      For Pharmacy Admin Tracking Only    Intervention Detail: Adherence Monitorin and Dose Adjustment: 1, reason: Improve Adherence, Therapy De-escalation  Total # of Interventions Recommended: 2  Total # of Interventions Accepted: 2  Time Spent (min):  25

## 2023-06-01 ENCOUNTER — INITIAL CONSULT (OUTPATIENT)
Dept: ONCOLOGY | Age: 58
End: 2023-06-01
Payer: MEDICAID

## 2023-06-01 ENCOUNTER — TELEPHONE (OUTPATIENT)
Dept: ONCOLOGY | Age: 58
End: 2023-06-01

## 2023-06-01 VITALS
DIASTOLIC BLOOD PRESSURE: 55 MMHG | RESPIRATION RATE: 16 BRPM | HEIGHT: 66 IN | HEART RATE: 65 BPM | BODY MASS INDEX: 23.93 KG/M2 | SYSTOLIC BLOOD PRESSURE: 96 MMHG | TEMPERATURE: 98 F | WEIGHT: 148.9 LBS

## 2023-06-01 DIAGNOSIS — C34.11 PRIMARY ADENOCARCINOMA OF UPPER LOBE OF RIGHT LUNG (HCC): Primary | ICD-10-CM

## 2023-06-01 DIAGNOSIS — R59.1 LYMPHADENOPATHY: ICD-10-CM

## 2023-06-01 PROCEDURE — 3074F SYST BP LT 130 MM HG: CPT | Performed by: INTERNAL MEDICINE

## 2023-06-01 PROCEDURE — G8420 CALC BMI NORM PARAMETERS: HCPCS | Performed by: INTERNAL MEDICINE

## 2023-06-01 PROCEDURE — 99202 OFFICE O/P NEW SF 15 MIN: CPT | Performed by: INTERNAL MEDICINE

## 2023-06-01 PROCEDURE — 3078F DIAST BP <80 MM HG: CPT | Performed by: INTERNAL MEDICINE

## 2023-06-01 PROCEDURE — 99245 OFF/OP CONSLTJ NEW/EST HI 55: CPT | Performed by: INTERNAL MEDICINE

## 2023-06-01 PROCEDURE — G8427 DOCREV CUR MEDS BY ELIG CLIN: HCPCS | Performed by: INTERNAL MEDICINE

## 2023-06-01 NOTE — TELEPHONE ENCOUNTER
PATRICIO ARRIVES AMBULATORY FOR CONSULTATION APPOINTMENT  DR Terese Dakin IN TO SEE PATIENT  ORDERS RECEIVED  LEFT NECK LYMPH NODE BIOPSY BY IR  HOLD PLAVIX AND ASPIRIN FOR 5 DAYS BEFORE BIOPSY  TEMPUS TEST ON RECENT LUNG BIOPYS  RV AFTER ABOVE TESTS  WRITER SPOKE TO JALEESA IN IR, IR DOC MUST REVIEW PT'S CHART THEN PT WILL BE CALLED TO SCHEDULE BX  TEMPUS TEST DRAWN AND SENT OUT VIA FED-EX.  TEMPUS ORDER, DEMOGRAPHICS, LABS, IMAGING & PATHOLOGY REPORT FAXED TO [facility] @ [phone], CONFIRMATION SCANNED TO CHART  ROUTING NOTE TO David Woodruff RN, MD VISIT 06/22/23 @11:45AM (WILL MOVE OUT IF BX IS NOT RESULTED)  AVS PRINTED AND GIVEN TO PATIENT WITH INSTRUCTIONS  PATIENT DISCHARGED AMBULATORY

## 2023-06-02 ENCOUNTER — TELEPHONE (OUTPATIENT)
Dept: CASE MANAGEMENT | Age: 58
End: 2023-06-02

## 2023-06-02 ENCOUNTER — TELEPHONE (OUTPATIENT)
Dept: INTERVENTIONAL RADIOLOGY/VASCULAR | Age: 58
End: 2023-06-02

## 2023-06-02 NOTE — TELEPHONE ENCOUNTER
Name: Mele Tomas  : 1965  MRN: [id number]    Oncology Navigation Follow-Up Note  Navigator left VM offering assistance and encouraging pt. To return call to IR. Plan to follow.    Electronically signed by Rasheeda Macias RN on 2023 at 12:45 PM

## 2023-06-05 ENCOUNTER — HOSPITAL ENCOUNTER (OUTPATIENT)
Age: 58
Setting detail: SPECIMEN
Discharge: HOME | End: 2023-06-05

## 2023-06-05 ENCOUNTER — TELEPHONE (OUTPATIENT)
Dept: CASE MANAGEMENT | Age: 58
End: 2023-06-05

## 2023-06-05 ENCOUNTER — TELEPHONE (OUTPATIENT)
Dept: ONCOLOGY | Age: 58
End: 2023-06-05

## 2023-06-05 NOTE — TELEPHONE ENCOUNTER
Name: Cody Howell  : 1965  MRN: [id number]    Oncology Navigation Follow-Up Note  Navigator spoke with pt. And offering assistance if needed. Pt. Again given number to reach out to IR, plan to follow.    Electronically signed by Harshal Frank RN on 2023 at 9:44 AM

## 2023-06-05 NOTE — TELEPHONE ENCOUNTER
PT'S BX IS SCHEDULED FOR 06/16/23. PT HAS ANTICOAGULATION APPOINTMENT ON 06/08/23. PT IS TO HOLD ASPRIN COUMADIN & PLAVIX 5 DAYS PRIOR. PT IS SCHEDULED TO SEE DR GREEN ON  06/22/23. APPOINTMENT WILL BE MOVED IF BX RESULTS ARE NOT BACK.

## 2023-06-07 ENCOUNTER — TELEPHONE (OUTPATIENT)
Dept: ONCOLOGY | Age: 58
End: 2023-06-07

## 2023-06-07 ENCOUNTER — TELEPHONE (OUTPATIENT)
Dept: CASE MANAGEMENT | Age: 58
End: 2023-06-07

## 2023-06-07 ENCOUNTER — TELEPHONE (OUTPATIENT)
Dept: FAMILY MEDICINE CLINIC | Age: 58
End: 2023-06-07

## 2023-06-07 PROBLEM — C34.11 PRIMARY ADENOCARCINOMA OF UPPER LOBE OF RIGHT LUNG (HCC): Status: ACTIVE | Noted: 2023-06-07

## 2023-06-07 PROBLEM — R59.1 LYMPHADENOPATHY: Status: ACTIVE | Noted: 2023-06-07

## 2023-06-07 NOTE — TELEPHONE ENCOUNTER
Name: Bronson Guerra  : 1965  MRN: [id number]    Oncology Navigation Follow-Up Note  Navigator received message and [employer] unable to completed MMR and IHS testing has been cancelled. All remaining test are pending.    Electronically signed by Lane Wyatt RN on 2023 at 11:54 AM

## 2023-06-07 NOTE — TELEPHONE ENCOUNTER
Writer received home health care plan and reviewed medication list which was found to be discordant. Per medication list from home health care patient is still taking Coreg at 25 mg twice daily and is taking Lantus at 22 units every morning instead of 24 units. Resident team to address this with patient and discussed with patient adjusting patient's medications versus return to clinic versus speaking with home health care team versus other.

## 2023-06-07 NOTE — TELEPHONE ENCOUNTER
RECEIVED LETTER FROM ARLET STATING THE TUMOR SAMPLE THAT WAS RECEIVED WAS INSUFFICIENT TO COMPLETE MMR TESTING & TEST HAS BEEN CANCELLED. WRITER NOTIFIED Vipin Bejarano RN. OLGA WILL LOOK INTO IT AND GET BACK TO WRITER.

## 2023-06-08 ENCOUNTER — TELEPHONE (OUTPATIENT)
Dept: PHARMACY | Age: 58
End: 2023-06-08

## 2023-06-08 NOTE — TELEPHONE ENCOUNTER
Patient called to report his ride did not arrive to pick him up for his appointment today. He also reports that he has a biopsy of his lymph nodes scheduled for Rosangela 15 and has been instructed to hold warfarin for 5 days prior. His last warfarin dose will be Friday Ros[address]ela 10. He is not bridging with Lovenox. Patient states warfarin was held for a previous biopsy without Lovenox. I advised increasing warfarin to 7.5 mg  on Rosangela 15 if okay to resume warfarin per surgeon. Then continue current maintenance regimen until recheck one week after resuming warfarin therapy on . Patient has an appointment on  with Dr Ricky Pickett. [address] [place] [address]  Ph., CACP, Clinical Pharmacist  Anticoagulation Services, [address] [place] [address] Coumadin Clinic  2023  9:09 AM      For Pharmacy Admin Tracking Only    Intervention Detail: Adherence Monitorin and Dose Adjustment: 1, reason: Therapy Optimization  Total # of Interventions Recommended: 2  Total # of Interventions Accepted: 2  Time Spent (min): 20

## 2023-06-16 ENCOUNTER — HOSPITAL ENCOUNTER (OUTPATIENT)
Dept: ULTRASOUND IMAGING | Age: 58
Discharge: HOME | End: 2023-06-18
Payer: MEDICAID

## 2023-06-16 VITALS
RESPIRATION RATE: 16 BRPM | OXYGEN SATURATION: 100 % | SYSTOLIC BLOOD PRESSURE: 124 MMHG | DIASTOLIC BLOOD PRESSURE: 65 MMHG | HEART RATE: 68 BPM

## 2023-06-16 DIAGNOSIS — C34.11 PRIMARY ADENOCARCINOMA OF UPPER LOBE OF RIGHT LUNG (HCC): ICD-10-CM

## 2023-06-16 DIAGNOSIS — R59.1 LYMPHADENOPATHY: ICD-10-CM

## 2023-06-16 PROCEDURE — 76536 US EXAM OF HEAD AND NECK: CPT

## 2023-06-16 NOTE — PROGRESS NOTES
Pt arrives to room for left cervical LN biopsy  Dr Sharon Franklin to room  [facility] RDMS to bedside  Upon imaging, there is not any site visible via US to biopsy  Additional images obtained  Dr Sharon Franklin explained findings to pt  Dc home

## 2023-06-21 ENCOUNTER — HOSPITAL ENCOUNTER (OUTPATIENT)
Dept: PHARMACY | Age: 58
Setting detail: THERAPIES SERIES
Discharge: HOME | End: 2023-06-21
Payer: MEDICAID

## 2023-06-21 DIAGNOSIS — E11.42 TYPE 2 DIABETES MELLITUS WITH DIABETIC POLYNEUROPATHY, WITH LONG-TERM CURRENT USE OF INSULIN (HCC): ICD-10-CM

## 2023-06-21 DIAGNOSIS — I25.10 CORONARY ARTERY DISEASE INVOLVING NATIVE HEART WITHOUT ANGINA PECTORIS, UNSPECIFIED VESSEL OR LESION TYPE: ICD-10-CM

## 2023-06-21 DIAGNOSIS — I10 ESSENTIAL HYPERTENSION: ICD-10-CM

## 2023-06-21 DIAGNOSIS — I65.23 CAROTID STENOSIS, BILATERAL: Primary | ICD-10-CM

## 2023-06-21 DIAGNOSIS — Z79.4 TYPE 2 DIABETES MELLITUS WITH DIABETIC POLYNEUROPATHY, WITH LONG-TERM CURRENT USE OF INSULIN (HCC): ICD-10-CM

## 2023-06-21 LAB
INR BLD: 2.4
PROTIME: 28.9 SECONDS

## 2023-06-21 PROCEDURE — 99212 OFFICE O/P EST SF 10 MIN: CPT

## 2023-06-21 PROCEDURE — 85610 PROTHROMBIN TIME: CPT

## 2023-06-21 RX ORDER — LISINOPRIL 20 MG/1
20 TABLET ORAL DAILY
Qty: 30 TABLET | Refills: 2 | Status: SHIPPED | OUTPATIENT
Start: 2023-06-21

## 2023-06-21 NOTE — PROGRESS NOTES
Medication Management Service, Warfarin Management  ELZA BENTON [facility], [phone]  Visit Date: 2023     Subjective:   Riley Parsons is a 60 y.o. male who presents to clinic today for anticoagulation monitoring and adjustment. Patient seen in clinic for warfarin management due to  Indication:    internal carotid stent . INR goal: of 2.0-3.0. Duration of therapy: 6 months, until 2023. Assessment and PLAN   PT/INR done in office per protocol. INR today is 2.4, therapeutic and down from 3.7 at last visit, after having only 25mg this past week. Patient reports his appetite and thus dietary intake is improving, eating healthier food options. Plan: Will reduce regimen by 7.7% to warfarin 2.5mg on Tues, Fri only; 5mg all other days of the week until therapy is complete on 2023. Using warfarin 5 mg tablets. Recheck INR is not needed, 6 months of therapy (per referral) complete on 2023. Patient seen in room # 3. ED OP = Reminded patient of importance of consistency with 2-3  servings of green vegetables per week. Also, patient has been diagnosed with lung cancer, and has an upcoming appt with Dr. Susana Patel, I requested that patient directly inform MD that warfarin will be complete on . He agreed. Patient verbalized understanding of dosing directions and information discussed. Dosing schedule given to patient. Progress note sent to referring office.     Sarthak Fuentes RPh, CACP  Clinical Pharmacist Medication Management  2023  11:32 AM      For Pharmacy Admin Tracking Only    Intervention Detail: Adherence Monitorin and Dose Adjustment: 1, reason: Improve Adherence, Therapy De-escalation  Total # of Interventions Recommended: 2  Total # of Interventions Accepted: 2  Time Spent (min): 20

## 2023-06-21 NOTE — TELEPHONE ENCOUNTER
Faxed Refill Request of Lisinopril received from patient, Pharmacy confirmed. Medication Pended. Pt last seen on 5/4/23, Next appt is on call list.    Health Maintenance   Topic Date Due    COVID-19 Vaccine (1) Never done    Diabetic retinal exam  Never done    Colorectal Cancer Screen  Never done    Lipids  01/09/2024    Diabetic Alb to Cr ratio (uACR) test  01/27/2024    Depression Screen  03/10/2024    GFR test (Diabetes, CKD 3-4, OR last GFR 15-59)  04/24/2024    Diabetic foot exam  05/04/2024    A1C test (Diabetic or Prediabetic)  05/04/2024    DTaP/Tdap/Td vaccine (2 - Td or Tdap) 11/11/2025    Hepatitis B vaccine  Completed    Flu vaccine  Completed    Shingles vaccine  Completed    Pneumococcal 0-64 years Vaccine  Completed    Hepatitis C screen  Completed    HIV screen  Completed    Hepatitis A vaccine  Aged Out    Hib vaccine  Aged Out    Meningococcal (ACWY) vaccine  Aged Out    Low dose CT lung screening &/or counseling  Discontinued       Hemoglobin A1C (%)   Date Value   05/04/2023 6.4   02/24/2023 10.3   01/09/2023 12.7 (H)             ( goal A1C is < 7)   Microalb/Crt.  Ratio (mcg/mg creat)   Date Value   01/27/2023 220 (H)     LDL Cholesterol (mg/dL)   Date Value   01/09/2023 61       (goal LDL is <100)   AST (U/L)   Date Value   01/07/2023 16     ALT (U/L)   Date Value   01/07/2023 18     BUN (mg/dL)   Date Value   04/24/2023 10     BP Readings from Last 3 Encounters:   06/16/23 124/65   06/01/23 (!) 96/55   05/16/23 106/61          (goal 120/80)    All Future Testing planned in [facility]  Lab Frequency Next Occurrence       Next Visit Date:  Future Appointments   Date Time Provider Katelyn Kulkarni   6/22/2023 11:45 AM Johnnie Gutierrez MD SV Cancer [facility]   7/5/2023  1:15 PM Reinier Aviles MD Resp Spec Ashanti More   7/18/2023  2:00 PM Mohinder Rodríguez MD Neuro Endo [facility]   8/1/2023  9:45 AM Jaydon Reed MD AFL TCC LISA CASTANEDA            Patient Active Problem List:     PAD (peripheral

## 2023-06-22 ENCOUNTER — TELEPHONE (OUTPATIENT)
Dept: ONCOLOGY | Age: 58
End: 2023-06-22

## 2023-06-22 ENCOUNTER — OFFICE VISIT (OUTPATIENT)
Dept: ONCOLOGY | Age: 58
End: 2023-06-22
Payer: MEDICAID

## 2023-06-22 VITALS
RESPIRATION RATE: 16 BRPM | BODY MASS INDEX: 22.95 KG/M2 | DIASTOLIC BLOOD PRESSURE: 77 MMHG | TEMPERATURE: 97.1 F | HEART RATE: 69 BPM | WEIGHT: 142.2 LBS | SYSTOLIC BLOOD PRESSURE: 159 MMHG

## 2023-06-22 DIAGNOSIS — C34.11 PRIMARY ADENOCARCINOMA OF UPPER LOBE OF RIGHT LUNG (HCC): Primary | ICD-10-CM

## 2023-06-22 PROCEDURE — G8420 CALC BMI NORM PARAMETERS: HCPCS | Performed by: INTERNAL MEDICINE

## 2023-06-22 PROCEDURE — 99214 OFFICE O/P EST MOD 30 MIN: CPT | Performed by: INTERNAL MEDICINE

## 2023-06-22 PROCEDURE — G8428 CUR MEDS NOT DOCUMENT: HCPCS | Performed by: INTERNAL MEDICINE

## 2023-06-22 PROCEDURE — 3074F SYST BP LT 130 MM HG: CPT | Performed by: INTERNAL MEDICINE

## 2023-06-22 PROCEDURE — 3017F COLORECTAL CA SCREEN DOC REV: CPT | Performed by: INTERNAL MEDICINE

## 2023-06-22 PROCEDURE — 1036F TOBACCO NON-USER: CPT | Performed by: INTERNAL MEDICINE

## 2023-06-22 PROCEDURE — 99211 OFF/OP EST MAY X REQ PHY/QHP: CPT | Performed by: INTERNAL MEDICINE

## 2023-06-22 PROCEDURE — 3078F DIAST BP <80 MM HG: CPT | Performed by: INTERNAL MEDICINE

## 2023-06-22 NOTE — TELEPHONE ENCOUNTER
Oral Juany MEZA VISIT  DR Flo Dudley IN TO SEE PT  ORDERS RECEIVED  REFER TO RAD/ONC  RV 3 MONTHS WITH CT CHEST BEFORE  WRITER CALLED RAD/ONC @[phone], NO ANSWER LEFT VM  CHEST CT WO CONTRAST  TO BE DONE PRIOR TO 09/28/23, Devan Covarrubias MD VISIT 09/28/23 @11AM  AVS PRINTED AND GIVEN TO PATIENT WITH INSTRUCTIONS  PATIENT DISCHARGED AMBULATORY Normal gait / station

## 2023-06-23 ENCOUNTER — TELEPHONE (OUTPATIENT)
Dept: RADIATION ONCOLOGY | Age: 58
End: 2023-06-23

## 2023-06-23 NOTE — TELEPHONE ENCOUNTER
Contacted patient to schedule offered next available declined next week due to other appointments.   Patient scheduled 7/7/23 @8am.

## 2023-06-27 ENCOUNTER — TELEPHONE (OUTPATIENT)
Dept: CASE MANAGEMENT | Age: 58
End: 2023-06-27

## 2023-07-05 ENCOUNTER — OFFICE VISIT (OUTPATIENT)
Dept: PULMONOLOGY | Age: 58
End: 2023-07-05
Payer: MEDICAID

## 2023-07-05 VITALS
BODY MASS INDEX: 23.01 KG/M2 | RESPIRATION RATE: 16 BRPM | HEIGHT: 66 IN | SYSTOLIC BLOOD PRESSURE: 90 MMHG | OXYGEN SATURATION: 97 % | DIASTOLIC BLOOD PRESSURE: 59 MMHG | WEIGHT: 143.2 LBS | HEART RATE: 92 BPM

## 2023-07-05 DIAGNOSIS — J43.2 CENTRILOBULAR EMPHYSEMA (HCC): ICD-10-CM

## 2023-07-05 DIAGNOSIS — E11.40 TYPE 2 DIABETES MELLITUS WITH DIABETIC NEUROPATHY, WITHOUT LONG-TERM CURRENT USE OF INSULIN (HCC): ICD-10-CM

## 2023-07-05 DIAGNOSIS — I50.22 CHRONIC SYSTOLIC CONGESTIVE HEART FAILURE (HCC): ICD-10-CM

## 2023-07-05 DIAGNOSIS — Z87.891 HISTORY OF SMOKING 30 OR MORE PACK YEARS: ICD-10-CM

## 2023-07-05 DIAGNOSIS — C34.11 MALIGNANT NEOPLASM OF UPPER LOBE OF RIGHT LUNG (HCC): Primary | ICD-10-CM

## 2023-07-05 PROCEDURE — 99214 OFFICE O/P EST MOD 30 MIN: CPT | Performed by: INTERNAL MEDICINE

## 2023-07-05 PROCEDURE — 3078F DIAST BP <80 MM HG: CPT | Performed by: INTERNAL MEDICINE

## 2023-07-05 PROCEDURE — 3023F SPIROM DOC REV: CPT | Performed by: INTERNAL MEDICINE

## 2023-07-05 PROCEDURE — G8420 CALC BMI NORM PARAMETERS: HCPCS | Performed by: INTERNAL MEDICINE

## 2023-07-05 PROCEDURE — 3017F COLORECTAL CA SCREEN DOC REV: CPT | Performed by: INTERNAL MEDICINE

## 2023-07-05 PROCEDURE — G8427 DOCREV CUR MEDS BY ELIG CLIN: HCPCS | Performed by: INTERNAL MEDICINE

## 2023-07-05 PROCEDURE — 1036F TOBACCO NON-USER: CPT | Performed by: INTERNAL MEDICINE

## 2023-07-05 PROCEDURE — 3074F SYST BP LT 130 MM HG: CPT | Performed by: INTERNAL MEDICINE

## 2023-07-05 RX ORDER — DAPAGLIFLOZIN 10 MG/1
TABLET, FILM COATED ORAL
Qty: 30 TABLET | Refills: 5 | Status: SHIPPED | OUTPATIENT
Start: 2023-07-05

## 2023-07-05 RX ORDER — PSEUDOEPHED/ACETAMINOPH/DIPHEN 30MG-500MG
TABLET ORAL
Qty: 120 TABLET | Refills: 3 | Status: SHIPPED | OUTPATIENT
Start: 2023-07-05

## 2023-07-05 NOTE — TELEPHONE ENCOUNTER
Last visit: [id number]  Last Med refill: [id number]  Does patient have enough medication for 72 hours: No:     Next Visit Date:  Future Appointments   Date Time Provider [address]   7/7/2023  8:00 AM SCHEDULE, STKATHY PBURG RAD ONC NURSE PB PB RONC [facility] [place] [facility]   7/18/2023  2:00 PM Carmencita Rangel MD Neuro Endo [facility]   8/1/2023  9:45 AM Jhonathan Salazar MD AFL TCC [facility]   9/28/2023 10:00 AM Peter Funse MD SV Cancer Ct [facility]   10/4/2023  1:00 PM Dionisio Davis MD Resp Spec [address] Maintenance   Topic Date Due    COVID-19 Vaccine (1) Never done    Diabetic retinal exam  Never done    Colorectal Cancer Screen  Never done    Flu vaccine (1) 08/01/2023    Lipids  01/09/2024    Diabetic Alb to Cr ratio (uACR) test  01/27/2024    Depression Screen  03/10/2024    GFR test (Diabetes, CKD 3-4, OR last GFR 15-59)  04/24/2024    Diabetic foot exam  05/04/2024    A1C test (Diabetic or Prediabetic)  05/04/2024    DTaP/Tdap/Td vaccine (2 - Td or Tdap) 11/11/2025    Hepatitis B vaccine  Completed    Shingles vaccine  Completed    Pneumococcal 0-64 years Vaccine  Completed    Hepatitis C screen  Completed    HIV screen  Completed    Hepatitis A vaccine  Aged Out    Hib vaccine  Aged Out    Meningococcal (ACWY) vaccine  Aged Out    Low dose CT lung screening &/or counseling  Discontinued       Hemoglobin A1C (%)   Date Value   05/04/2023 6.4   02/24/2023 10.3   01/09/2023 12.7 (H)             ( goal A1C is < 7)   Microalb/Crt.  Ratio (mcg/mg creat)   Date Value   01/27/2023 220 (H)     LDL Cholesterol (mg/dL)   Date Value   01/09/2023 61   01/13/2022            (goal LDL is <100)   AST (U/L)   Date Value   01/07/2023 16     ALT (U/L)   Date Value   01/07/2023 18     BUN (mg/dL)   Date Value   04/24/2023 10     BP Readings from Last 3 Encounters:   07/05/23 (!) 90/59   06/22/23 (!) 159/77   06/16/23 124/65          (goal 120/80)    All Future Testing planned in [facility]  Lab Frequency Next

## 2023-07-05 NOTE — PROGRESS NOTES
present in the lungs which are very small for PET uptake and also for any intervention at this time although less likely but this could still be metastatic and will need follow-up. CT scan of the chest done on 03/29/2023 on review of the CT scan of the chest patient has a lateral right upper lobe groundglass nodule which was reported to be 15 mm on 03/29/2023 and on review of the CT scan on 08/23/2022 at that time it was 12 mm there is another  6 mm nodule present which is stable. .    Patient does have significant centrilobular and panlobular emphysema present on the CT scan with history of smoking until January 2023. Plan and recommendation:    Proceed with SBRT as recommended by oncology. Follow-up with radiation oncology and oncology. Continue on Stiolto 2 puff once daily. Albuterol to be used as needed. Vaccinations recommended and due for flu and fall  Up to date with vaccinations from [address] [place] [address] an active lifestyle   Questions answered pertaining to diagnosis and management explained importance of compliance with therapy     Follow-up in office in 3 months    It was my pleasure to evaluate Griffin Phelan today. Please call with questions. Hallie Moreno MD, MD             7/5/2023, 1:39 PM     Please note that this chart was generated using voice recognition Dragon dictation software. Although every effort was made to ensure the accuracy of this automated transcription, some errors in transcription may have occurred.

## 2023-07-05 NOTE — TELEPHONE ENCOUNTER
Last visit: [id number]  Last Med refill: [id number]  Does patient have enough medication for 72 hours: No:     Next Visit Date:  Future Appointments   Date Time Provider [address]   7/7/2023  8:00 AM SCHEDULE, NAVA PBURG RAD ONC NURSE [facility] PB RON[facility]   7/18/2023  2:00 PM Cory Figueroa MD Neuro Endo [facility]   8/1/2023  9:45 AM Nolberto Pham MD AFL TCC TOLE [facility]   9/28/2023 10:00 AM Bang Hernandez MD SV Cancer Ct [facility]   10/4/2023  1:00 PM Dionisio Pizarro MD Resp Spec [address] Maintenance   Topic Date Due    COVID-19 Vaccine (1) Never done    Diabetic retinal exam  Never done    Colorectal Cancer Screen  Never done    Flu vaccine (1) 08/01/2023    Lipids  01/09/2024    Diabetic Alb to Cr ratio (uACR) test  01/27/2024    Depression Screen  03/10/2024    GFR test (Diabetes, CKD 3-4, OR last GFR 15-59)  04/24/2024    Diabetic foot exam  05/04/2024    A1C test (Diabetic or Prediabetic)  05/04/2024    DTaP/Tdap/Td vaccine (2 - Td or Tdap) 11/11/2025    Hepatitis B vaccine  Completed    Shingles vaccine  Completed    Pneumococcal 0-64 years Vaccine  Completed    Hepatitis C screen  Completed    HIV screen  Completed    Hepatitis A vaccine  Aged Out    Hib vaccine  Aged Out    Meningococcal (ACWY) vaccine  Aged Out    Low dose CT lung screening &/or counseling  Discontinued       Hemoglobin A1C (%)   Date Value   05/04/2023 6.4   02/24/2023 10.3   01/09/2023 12.7 (H)             ( goal A1C is < 7)   Microalb/Crt.  Ratio (mcg/mg creat)   Date Value   01/27/2023 220 (H)     LDL Cholesterol (mg/dL)   Date Value   01/09/2023 61   01/13/2022            (goal LDL is <100)   AST (U/L)   Date Value   01/07/2023 16     ALT (U/L)   Date Value   01/07/2023 18     BUN (mg/dL)   Date Value   04/24/2023 10     BP Readings from Last 3 Encounters:   07/05/23 (!) 90/59   06/22/23 (!) 159/77   06/16/23 124/65          (goal 120/80)    All Future Testing planned in [facility]  Lab Frequency Next

## 2023-07-05 NOTE — TELEPHONE ENCOUNTER
Last visit: [id number]  Last Med refill: [id number]  Does patient have enough medication for 72 hours: No:     Next Visit Date:  Future Appointments   Date Time Provider [address]   7/7/2023  8:00 AM SCHEDULE, NAVA PBURG RAD ONC NURSE [facility] PB [facility]ON[facility]   7/18/2023  2:00 PM Rozina Dhillon MD Neuro Endo [facility]   8/1/2023  9:45 AM Romy Ferreira MD AFL TCC [facility]LE [facility]   9/28/2023 10:00 AM Otis Henson MD SV [facility]   10/4/2023  1:00 PM Dionisio Grossman MD Resp Spec [address] Maintenance   Topic Date Due    COVID-19 Vaccine (1) Never done    Diabetic retinal exam  Never done    Colorectal Cancer Screen  Never done    Flu vaccine (1) 08/01/2023    Lipids  01/09/2024    Diabetic Alb to Cr ratio (uACR) test  01/27/2024    Depression Screen  03/10/2024    GFR test (Diabetes, CKD 3-4, OR last GFR 15-59)  04/24/2024    Diabetic foot exam  05/04/2024    A1C test (Diabetic or Prediabetic)  05/04/2024    DTaP/Tdap/Td vaccine (2 - Td or Tdap) 11/11/2025    Hepatitis B vaccine  Completed    Shingles vaccine  Completed    Pneumococcal 0-64 years Vaccine  Completed    Hepatitis C screen  Completed    HIV screen  Completed    Hepatitis A vaccine  Aged Out    Hib vaccine  Aged Out    Meningococcal (ACWY) vaccine  Aged Out    Low dose CT lung screening &/or counseling  Discontinued       Hemoglobin A1C (%)   Date Value   05/04/2023 6.4   02/24/2023 10.3   01/09/2023 12.7 (H)             ( goal A1C is < 7)   Microalb/Crt.  Ratio (mcg/mg creat)   Date Value   01/27/2023 220 (H)     LDL Cholesterol (mg/dL)   Date Value   01/09/2023 61   01/13/2022            (goal LDL is <100)   AST (U/L)   Date Value   01/07/2023 16     ALT (U/L)   Date Value   01/07/2023 18     BUN (mg/dL)   Date Value   04/24/2023 10     BP Readings from Last 3 Encounters:   07/05/23 (!) 90/59   06/22/23 (!) 159/77   06/16/23 124/65          (goal 120/80)    All Future Testing planned in [facility]  Lab Frequency Next

## 2023-07-06 ENCOUNTER — HOSPITAL ENCOUNTER (OUTPATIENT)
Age: 58
Setting detail: SPECIMEN
Discharge: HOME | End: 2023-07-06

## 2023-07-07 ENCOUNTER — HOSPITAL ENCOUNTER (OUTPATIENT)
Dept: RADIATION ONCOLOGY | Age: 58
Discharge: HOME | End: 2023-07-07
Payer: MEDICAID

## 2023-07-07 VITALS
OXYGEN SATURATION: 100 % | WEIGHT: 142 LBS | BODY MASS INDEX: 22.92 KG/M2 | SYSTOLIC BLOOD PRESSURE: 100 MMHG | DIASTOLIC BLOOD PRESSURE: 71 MMHG | TEMPERATURE: 97.9 F | HEART RATE: 80 BPM | RESPIRATION RATE: 16 BRPM

## 2023-07-07 DIAGNOSIS — C34.11 PRIMARY ADENOCARCINOMA OF UPPER LOBE OF RIGHT LUNG (HCC): Primary | ICD-10-CM

## 2023-07-07 PROCEDURE — 99213 OFFICE O/P EST LOW 20 MIN: CPT | Performed by: RADIOLOGY

## 2023-07-07 ASSESSMENT — PAIN SCALES - GENERAL: PAINLEVEL_OUTOF10: 0

## 2023-07-07 NOTE — CONSULTS
showed uptake in the left lower cervical region and patient was referred to interventional radiology for biopsy, however on further evaluation by IR no lymph nodes were visible for biopsy. Patient was offered surgical excision exploration of that area but he had declined. 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 I will order a repeat FDG PET given his prior PET was approximately 3 months ago to evaluate for interval changes. If patient continues to have no evidence of progressive disease, he would be a candidate for local ablative therapy. I discussed the logistics, goals, side effects of radiation. Radiation will be given with curative intent. Radiation will be given daily Monday through Friday every other day for a total of 5 sessions. Patient will come back for CT simulation after his repeat FDG PET is completed. He is to continue following up with pulmonary and medical oncology. With regards to radiation to the lung, I discussed the possible short-term side effects of skin irritation (causing redness, dryness, or peeling), tiredness, low blood counts (causing infection or bleeding), inflammation of the lungs (causing shortness of breath and cough), trouble/pain with swallowing and hair loss in treated area, or hemoptysis. Possible long-term side effects discussed included hyperpigmentation of the skin, scarring of the lungs (causing shortness of breath and cough), chronic dysphagia, damage to the nerves (causing numbness, weakness, or paralysis) and damage to the heart. Patient was in agreement with my recommendations. All questions were answered to their satisfaction. Patient was advised to contact us anytime should they have any questions or concerns.      Electronically signed by Linette Mon MD on 7/7/2023 at 9:22 AM      Drugs Prescribed:  New Prescriptions    No medications on file       Orders

## 2023-07-17 ENCOUNTER — HOSPITAL ENCOUNTER (OUTPATIENT)
Dept: NUCLEAR MEDICINE | Age: 58
Discharge: HOME | End: 2023-07-19
Attending: RADIOLOGY
Payer: MEDICAID

## 2023-07-17 DIAGNOSIS — C34.11 PRIMARY ADENOCARCINOMA OF UPPER LOBE OF RIGHT LUNG (HCC): ICD-10-CM

## 2023-07-17 LAB — METER GLUCOSE: 95 MG/DL (ref 75–110)

## 2023-07-17 PROCEDURE — A9552 F18 FDG: HCPCS | Performed by: RADIOLOGY

## 2023-07-17 PROCEDURE — 2580000003 HC RX 258: Performed by: RADIOLOGY

## 2023-07-17 PROCEDURE — 78815 PET IMAGE W/CT SKULL-THIGH: CPT

## 2023-07-17 PROCEDURE — 3430000000 HC RX DIAGNOSTIC RADIOPHARMACEUTICAL: Performed by: RADIOLOGY

## 2023-07-17 PROCEDURE — 82947 ASSAY GLUCOSE BLOOD QUANT: CPT

## 2023-07-17 RX ORDER — SODIUM CHLORIDE 0.9 % (FLUSH) 0.9 %
5-40 SYRINGE (ML) INJECTION
Status: COMPLETED | OUTPATIENT
Start: 2023-07-17 | End: 2023-07-17

## 2023-07-17 RX ORDER — FLUDEOXYGLUCOSE F 18 200 MCI/ML
10 INJECTION, SOLUTION INTRAVENOUS
Status: COMPLETED | OUTPATIENT
Start: 2023-07-17 | End: 2023-07-17

## 2023-07-17 RX ADMIN — FLUDEOXYGLUCOSE F 18 9.95 MILLICURIE: 200 INJECTION, SOLUTION INTRAVENOUS at 15:32

## 2023-07-17 RX ADMIN — SODIUM CHLORIDE, PRESERVATIVE FREE 10 ML: 5 INJECTION INTRAVENOUS at 15:32

## 2023-07-18 ENCOUNTER — OFFICE VISIT (OUTPATIENT)
Dept: NEUROLOGY | Age: 58
End: 2023-07-18
Payer: MEDICAID

## 2023-07-18 VITALS
BODY MASS INDEX: 22.82 KG/M2 | SYSTOLIC BLOOD PRESSURE: 87 MMHG | HEART RATE: 80 BPM | DIASTOLIC BLOOD PRESSURE: 58 MMHG | OXYGEN SATURATION: 99 % | HEIGHT: 66 IN | TEMPERATURE: 97.7 F | WEIGHT: 142 LBS

## 2023-07-18 DIAGNOSIS — I66.01 STENOSIS OF RIGHT MIDDLE CEREBRAL ARTERY: ICD-10-CM

## 2023-07-18 DIAGNOSIS — I65.23 CAROTID STENOSIS, BILATERAL: Primary | ICD-10-CM

## 2023-07-18 DIAGNOSIS — I65.02 OCCLUSION OF LEFT VERTEBRAL ARTERY: ICD-10-CM

## 2023-07-18 PROCEDURE — 3017F COLORECTAL CA SCREEN DOC REV: CPT | Performed by: PSYCHIATRY & NEUROLOGY

## 2023-07-18 PROCEDURE — 1036F TOBACCO NON-USER: CPT | Performed by: PSYCHIATRY & NEUROLOGY

## 2023-07-18 PROCEDURE — 3074F SYST BP LT 130 MM HG: CPT | Performed by: PSYCHIATRY & NEUROLOGY

## 2023-07-18 PROCEDURE — 99215 OFFICE O/P EST HI 40 MIN: CPT | Performed by: PSYCHIATRY & NEUROLOGY

## 2023-07-18 PROCEDURE — 3078F DIAST BP <80 MM HG: CPT | Performed by: PSYCHIATRY & NEUROLOGY

## 2023-07-18 PROCEDURE — G8427 DOCREV CUR MEDS BY ELIG CLIN: HCPCS | Performed by: PSYCHIATRY & NEUROLOGY

## 2023-07-18 PROCEDURE — G8420 CALC BMI NORM PARAMETERS: HCPCS | Performed by: PSYCHIATRY & NEUROLOGY

## 2023-07-18 NOTE — PROGRESS NOTES
Neurocritical Care, Stroke & Neurointerventional Note    [address] [address] [address] [place], [address]   P: [phone]  Endovascular Neurosurgery Consult    Pt Name: Nicko Hayward  MRN: [id number]  YOB: 1965  Date of evaluation: 7/18/2023  Primary Care Physician: Piedad Posadas MD    Reason for evaluation: Left ICA Occlusion and Left VA occlusion with 60% LU stenosis    SUBJECTIVE:   History of Chief Complaint:    Leora Collins is a 60 y.o. male who presents with PEA, right AKA, and CABG in April, He also had right leg fracture with ORIF and 6 months ago with subsequent infection. He also in 2019 presented with multiple bilateral scattered strokes with following arterial occlusion; Left ICA occlusion  Left VA occlusion  Right ICA 60%    S/p Right carotid stenting on 7/3/2019      He is doing well with no severe deficit    Doing well    No new TIA or stroke like symptoms     Allergies  has No Known Allergies. Medications  Prior to Admission medications    Medication Sig Start Date End Date Taking?  Authorizing Provider   FARXIGA 10 MG tablet TAKE 1 TABLET BY MOUTH EVERY MORNING 7/5/23  Yes Piedad Posadas MD   ACETAMINOPHEN EXTRA STRENGTH 500 MG tablet TAKE 2 TABLETS BY MOUTH EVERY 8 HOURS AS NEEDED FOR PAIN 7/5/23  Yes Katelyn Mosher MD   lisinopril (PRINIVIL;ZESTRIL) 20 MG tablet Take 1 tablet by mouth daily 6/21/23  Yes Piedad Posadas MD   ONETOUCH VERIO strip TEST 2 TIMES A DAY & AS NEEDED FOR SYMPTOMS OF IRREGULAR BLOOD GLUCOSE. 5/18/23  Yes Piedad Posadas MD   tiotropium-olodaterol (STIOLTO RESPIMAT) 2.5-2.5 MCG/ACT AERS Inhale 2 puffs into the lungs daily 5/16/23  Yes Danay Haskins MD   atorvastatin (LIPITOR) 80 MG tablet Take 1 tablet by mouth nightly 2/24/23  Yes Piedad Posadas MD   Insulin Pen Needle 32G X 6 MM MISC 1 each by Does not apply route daily 1/27/23  Yes Piedad Posadas MD   furosemide (LASIX) 40 MG tablet Take 1 tablet by mouth daily 1/23/23  Yes

## 2023-07-20 ENCOUNTER — HOSPITAL ENCOUNTER (OUTPATIENT)
Dept: RADIATION ONCOLOGY | Age: 58
Discharge: HOME | End: 2023-07-20
Payer: MEDICAID

## 2023-07-20 VITALS
HEART RATE: 73 BPM | BODY MASS INDEX: 22.92 KG/M2 | RESPIRATION RATE: 16 BRPM | TEMPERATURE: 97.3 F | SYSTOLIC BLOOD PRESSURE: 120 MMHG | WEIGHT: 142 LBS | DIASTOLIC BLOOD PRESSURE: 66 MMHG

## 2023-07-20 PROCEDURE — 77334 RADIATION TREATMENT AID(S): CPT | Performed by: RADIOLOGY

## 2023-07-20 PROCEDURE — 99212 OFFICE O/P EST SF 10 MIN: CPT | Performed by: RADIOLOGY

## 2023-07-20 NOTE — PROGRESS NOTES
Pt here for radiation teach/simulation for 5 SBRT right lung treatments. 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 He verbalized understanding of information and questions answered. No need for referrals at this time.

## 2023-07-20 NOTE — PROGRESS NOTES
tablet, Rfl: 3    Insulin Syringe-Needle U-100 30G X 5/16\" 1 ML MISC, 1 each by Does not apply route daily, Disp: 100 each, Rfl: 3    Alcohol Swabs (B-D SINGLE USE SWABS REGULAR) PADS, USE AS DIRECTED WITH TESTING SUPPLIES, Disp: 100 each, Rfl: 3    Lancets (ONETOUCH DELICA PLUS GOHLOB31A) MISC, USE AS DIRECTED TO TEST BLOOD SUGAR, Disp: 100 each, Rfl: 3    Blood Glucose Monitoring Suppl (ONE TOUCH ULTRA 2) w/Device KIT, U UTD ONCE D, Disp: , Rfl: 0    Handicap Placard MISC, by Does not apply route For 5 years, Disp: 1 each, Rfl: 0    Blood Glucose Monitoring Suppl (ACURA BLOOD GLUCOSE METER) w/Device KIT, 1 kit by Does not apply route once for 1 dose, Disp: 1 kit, Rfl: 0    Multiple Vitamins-Minerals (THERAPEUTIC MULTIVITAMIN-MINERALS) tablet, Take 1 tablet by mouth daily (with breakfast), Disp: , Rfl:                FALLS RISK SCREEN  Instructions:  Assess the patient and enter the appropriate indicators that are present for fall risk identification. Total the numbers entered and assign a fall risk score from Table 2.  Reassess patient at a minimum every 12 weeks or with status change. Assessment   Date  7/20/2023     1. Mental Ability: confusion/cognitively impaired 0     2. Elimination Issues: incontinence, frequency 0       3. Ambulatory: use of assistive devices (walker, cane, off-loading devices),        attached to equipment (IV pole, oxygen) 2     4. Sensory Limitations: dizziness, vertigo, impaired vision 0     5. Age less than 65        0     6. Age 72 or greater 0     7. Medication: diuretics, strong analgesics, hypnotics, sedatives,        antihypertensive agents 3   8. Falls:  recent history of falls within the last 3 months (not to include slipping or        tripping) 0   TOTAL 5    If score of 4 or greater was education given? Yes           TABLE 2   Risk Score Risk Level Plan of Care   0-3 Little or  No Risk 1. Provide assistance as indicated for ambulation activities  2.   Reorient
clinical information in the electronic medical record, care coordination, referring and communicating with other health care providers and interpretation of results independently. This note is created with the assistance of a speech recognition program.  While intending to generate a document that actually reflects the content of the visit, the document can still have some errors including those of syntax and sound a like substitutions which may escape proof reading. It such instances, actual meaning can be extrapolated by contextual diversion.

## 2023-07-20 NOTE — DISCHARGE INSTRUCTIONS
increase your fatigue. Ask your radiation oncologist or nurse for help with any other symptoms you may have. What to expect next! Simulation Scan      Prior to your radiation you will need a simulation CT scan. This scan is where they will precisely identify the area on your body where you will receive radiation. Positioning is extremely important, and your body will be positioned carefully. You will be in the same position during every treatment, and you will need to remain still during the treatments. Your doctor may order a mold or a mask (for head and neck treatment only) to help reproduce your treatment set up. You will also receive tattoos during this appointment. These tattoos are very important. The therapists use these tattoos to line your body up on the treatment table. Information from the CT scan is used to precisely locate the treatment fields and create a \"map\" for the physician to design the treatment. The CT scanner is specially designed to work with the other equipment in the department and is not a replacement for other diagnostic scans you may have received. After simulation, details from the procedure are forwarded to medical radiation dosimetrists and medical physicists. These professionals perform highly technical calculations that will be used to set up the treatment machine (linear accelerator). The dosimetrist and physicist work closely with your radiation oncologist to develop the treatment plan, a process that typically takes 7-10 business days. Once the planning is completed, a therapist will call you with a start date. During that call your appointment time will also be determined. Your appointment time will be at the same time each day.                          Head and Neck Mask                            Body Mold                        Radiation Tattoo  Daily Treatments       You will be placed on the treatment table in the same position as you were when you had your simulation

## 2023-07-25 ENCOUNTER — HOSPITAL ENCOUNTER (OUTPATIENT)
Dept: RADIATION ONCOLOGY | Age: 58
Discharge: HOME | End: 2023-07-25
Payer: MEDICAID

## 2023-07-25 PROCEDURE — 77300 RADIATION THERAPY DOSE PLAN: CPT | Performed by: RADIOLOGY

## 2023-07-25 PROCEDURE — 77301 RADIOTHERAPY DOSE PLAN IMRT: CPT | Performed by: RADIOLOGY

## 2023-07-25 PROCEDURE — 77338 DESIGN MLC DEVICE FOR IMRT: CPT | Performed by: RADIOLOGY

## 2023-07-25 PROCEDURE — 77293 RESPIRATOR MOTION MGMT SIMUL: CPT | Performed by: RADIOLOGY

## 2023-08-01 ENCOUNTER — TELEPHONE (OUTPATIENT)
Dept: CASE MANAGEMENT | Age: 58
End: 2023-08-01

## 2023-08-01 NOTE — TELEPHONE ENCOUNTER
Name: Osman Olivares  : 1965  MRN: [id number]    Oncology Navigation Follow-Up Note  Navigator spoke with pt. Offering assistance if needed. Pt. To start SBRT . Pt. Denies needs.    Electronically signed by Zachery Libman, RN on 2023 at 3:16 PM

## 2023-08-08 ENCOUNTER — HOSPITAL ENCOUNTER (OUTPATIENT)
Dept: RADIATION ONCOLOGY | Age: 58
Discharge: HOME | End: 2023-08-08
Payer: MEDICAID

## 2023-08-08 PROCEDURE — 77373 STRTCTC BDY RAD THER TX DLVR: CPT | Performed by: RADIOLOGY

## 2023-08-09 ENCOUNTER — APPOINTMENT (OUTPATIENT)
Dept: RADIATION ONCOLOGY | Age: 58
End: 2023-08-09
Payer: MEDICAID

## 2023-08-09 PROCEDURE — 77336 RADIATION PHYSICS CONSULT: CPT | Performed by: RADIOLOGY

## 2023-08-10 ENCOUNTER — HOSPITAL ENCOUNTER (OUTPATIENT)
Dept: RADIATION ONCOLOGY | Age: 58
Discharge: HOME | End: 2023-08-10
Payer: MEDICAID

## 2023-08-10 PROCEDURE — 77373 STRTCTC BDY RAD THER TX DLVR: CPT | Performed by: RADIOLOGY

## 2023-08-14 ENCOUNTER — HOSPITAL ENCOUNTER (OUTPATIENT)
Dept: RADIATION ONCOLOGY | Age: 58
Discharge: HOME | End: 2023-08-14
Payer: MEDICAID

## 2023-08-14 PROCEDURE — 77373 STRTCTC BDY RAD THER TX DLVR: CPT | Performed by: RADIOLOGY

## 2023-08-16 ENCOUNTER — HOSPITAL ENCOUNTER (OUTPATIENT)
Dept: RADIATION ONCOLOGY | Age: 58
Discharge: HOME | End: 2023-08-16
Payer: MEDICAID

## 2023-08-16 VITALS
TEMPERATURE: 98.4 F | HEART RATE: 91 BPM | SYSTOLIC BLOOD PRESSURE: 118 MMHG | RESPIRATION RATE: 16 BRPM | OXYGEN SATURATION: 96 % | DIASTOLIC BLOOD PRESSURE: 60 MMHG | BODY MASS INDEX: 22.23 KG/M2 | WEIGHT: 133.6 LBS

## 2023-08-16 DIAGNOSIS — C34.11 PRIMARY ADENOCARCINOMA OF UPPER LOBE OF RIGHT LUNG (HCC): Primary | ICD-10-CM

## 2023-08-16 PROCEDURE — 77373 STRTCTC BDY RAD THER TX DLVR: CPT | Performed by: RADIOLOGY

## 2023-08-17 ENCOUNTER — INITIAL CONSULT (OUTPATIENT)
Dept: PHYSICAL MEDICINE AND REHAB | Age: 58
End: 2023-08-17

## 2023-08-17 VITALS
TEMPERATURE: 97.6 F | SYSTOLIC BLOOD PRESSURE: 124 MMHG | HEART RATE: 100 BPM | DIASTOLIC BLOOD PRESSURE: 78 MMHG | HEIGHT: 65 IN | BODY MASS INDEX: 22.23 KG/M2

## 2023-08-17 DIAGNOSIS — Z89.611 HX OF AKA (ABOVE KNEE AMPUTATION), RIGHT (HCC): Primary | ICD-10-CM

## 2023-08-17 NOTE — PROGRESS NOTES
[address] [place] [address] 16 [facility] [address] [facility]  Dept: [phone]  Dept Fax: [phone]      Binu Rg, 60 y.o., male, is c/o of Leg amputation (Right AKA)  . HPI:     Mr. Leobrado Yin has a history of right Unilateral Lower Limb Above the Knee (AK) / transfemoral amputation due to peripheral artery disease and osteomyelitis. He originally had R BKA in August 2019 and then was transitioned to [facility] September 2019 by Dr. Evangelist Lopez. He does currently have a prosthesis. Any problems with current prosthesis? yes - he has had recent weight loss of >40 lbs due to medical comorbidities and oncology treatment which has led to poor fit of the prosthetic. It is too loose and is unstable for transfers or ambulation   He uses  wheelchair and rollator  for mobility/ambulation. He has participated in physical therapy for training to use prosthesis. The patient is able to don and doff the prosthesis with no assistance. He does not require assistance at home. Areas of pain or weakness:Intermittent aching pain of residual limb. No issues with phantom limb pain. Patient would like to be able to independently perform self-care, cooking/cleaning, attending appointments, household ambulation, and shopping.  .      Past Medical History:   Diagnosis Date    Above knee amputation of right lower extremity ([address]) 09/28/2019    Acute ischemic right MCA stroke ([address] [facility]) 44/33/6802    Acute metabolic encephalopathy     Anticoagulated on Coumadin     follows with coumadin clinic, all notes in [facility].    CAD (coronary artery disease) 04/07/2019    x4 CABG    Cardiac arrest ([address]) 04/07/2019    Cardiomyopathy, ischemic 01/09/2023    Carotid stenosis, bilateral     Cerebral multi-infarct state     Chronic systolic congestive heart failure ([address]) 01/27/2023    Diabetes mellitus ([address] [facility]) 04/2019    ON RX    Hyperlipidemia

## 2023-08-18 ENCOUNTER — HOSPITAL ENCOUNTER (OUTPATIENT)
Dept: RADIATION ONCOLOGY | Age: 58
Discharge: HOME | End: 2023-08-18
Payer: MEDICAID

## 2023-08-18 PROCEDURE — 77373 STRTCTC BDY RAD THER TX DLVR: CPT | Performed by: RADIOLOGY

## 2023-08-18 RX ORDER — WARFARIN SODIUM 5 MG/1
TABLET ORAL
Qty: 65 TABLET | Refills: 1 | Status: SHIPPED | OUTPATIENT
Start: 2023-08-18

## 2023-08-18 NOTE — TELEPHONE ENCOUNTER
Last visit: [id number]  Last Med refill: [id number]  Does patient have enough medication for 72 hours: No:     Next Visit Date:  Future Appointments   Date Time Provider [address]   9/28/2023 10:00 AM Marbin Reagan MD SV [facility]   10/4/2023  1:00 PM Mayi Cole MD Resp Spec [facility]   11/16/2023 10:00 AM STV CT ROOM 1 STVZ CT SCAN STV Radiolog   11/30/2023 10:15 AM SCHEDULE, STVZ PBURG RAD ONC NURSE [facility] PB RON[facility]   1/22/2024  1:00 PM Angelica Stubbs MD Neuro Endo [facility]   2/5/2024  9:15 AM Michelle Francisco MD AFL TCC TOLE AFL BELL C       Health Maintenance   Topic Date Due    COVID-19 Vaccine (1) Never done    Diabetic retinal exam  Never done    Colorectal Cancer Screen  Never done    Flu vaccine (1) 08/01/2023    Lipids  01/09/2024    Diabetic Alb to Cr ratio (uACR) test  01/27/2024    Depression Screen  03/10/2024    GFR test (Diabetes, CKD 3-4, OR last GFR 15-59)  04/24/2024    Diabetic foot exam  05/04/2024    A1C test (Diabetic or Prediabetic)  05/04/2024    DTaP/Tdap/Td vaccine (2 - Td or Tdap) 11/11/2025    Hepatitis B vaccine  Completed    Shingles vaccine  Completed    Pneumococcal 0-64 years Vaccine  Completed    Hepatitis C screen  Completed    HIV screen  Completed    Hepatitis A vaccine  Aged Out    Hib vaccine  Aged Out    Meningococcal (ACWY) vaccine  Aged Out    Low dose CT lung screening &/or counseling  Discontinued       Hemoglobin A1C (%)   Date Value   05/04/2023 6.4   02/24/2023 10.3   01/09/2023 12.7 (H)             ( goal A1C is < 7)   No components found for: LABMICR  LDL Cholesterol (mg/dL)   Date Value   01/09/2023 61   01/13/2022            (goal LDL is <100)   AST (U/L)   Date Value   01/07/2023 16     ALT (U/L)   Date Value   01/07/2023 18     BUN (mg/dL)   Date Value   04/24/2023 10     BP Readings from Last 3 Encounters:   08/17/23 124/78   08/16/23 118/60   08/01/23 124/76          (goal 120/80)    All Future Testing planned in [facility]

## 2023-08-29 ENCOUNTER — CLINICAL DOCUMENTATION (OUTPATIENT)
Dept: RADIATION ONCOLOGY | Age: 58
End: 2023-08-29

## 2023-08-29 NOTE — PROGRESS NOTES
[facility]            Radiation Oncology          [facility], [address] [facility]: [phone]        F: [phone]       [email or web address]           Dear Dr Pope ref. provider found: Thank you for referring Janet Meadows to me for evaluation and treatment. Below is a summary of the patient's recently completed radiation course. If you have questions, please do not hesitate to call me. I look forward to following this patient along with you. Sincerely,  Electronically signed by Jonnie Johnson MD on 2023 at 7:32 AM EDT      CC: Patient Care Team:  Marcelle Bolanos MD as PCP - General (Family Medicine)  Theresia Lundborg, RN as Nurse Navigator (Oncology)  Mayi Cole MD as Consulting Physician (Pulmonology)  ------------------------------------------------------------------------------------------------------------------------------------------------------------------------------------------        Date of Service: 2023     Location:  [address] [place] [address], [facility], 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 Patient is referred for consideration of local ablative radiation therapy. TREATMENT DETAILS:        Concurrent Chemotherapy: None    CLINICAL COURSE:    ECOG 2    Patient completed the treatment as prescribed and tolerated treatment as expected. Patient will come back in 3 month for a follow up visit and to assess treatment toxicity and response. Patient was advised to continue close follow up with medical oncology and surgery as well. Patient does have our contact information in case they have any questions or concerns in the interim.     Electronically signed by Jonnie Johnson MD on 2023 at 7:32

## 2023-09-05 ENCOUNTER — HOSPITAL ENCOUNTER (INPATIENT)
Age: 58
LOS: 13 days | Discharge: NURSING, REHAB OR LONG-TERM CARE | DRG: 720 | End: 2023-09-18
Attending: EMERGENCY MEDICINE | Admitting: INTERNAL MEDICINE
Payer: MEDICAID

## 2023-09-05 ENCOUNTER — APPOINTMENT (OUTPATIENT)
Dept: GENERAL RADIOLOGY | Age: 58
DRG: 720 | End: 2023-09-05
Payer: MEDICAID

## 2023-09-05 ENCOUNTER — APPOINTMENT (OUTPATIENT)
Dept: CT IMAGING | Age: 58
DRG: 720 | End: 2023-09-05
Payer: MEDICAID

## 2023-09-05 DIAGNOSIS — I21.4 NSTEMI (NON-ST ELEVATED MYOCARDIAL INFARCTION) (HCC): ICD-10-CM

## 2023-09-05 DIAGNOSIS — E11.40 TYPE 2 DIABETES MELLITUS WITH DIABETIC NEUROPATHY, WITHOUT LONG-TERM CURRENT USE OF INSULIN (HCC): ICD-10-CM

## 2023-09-05 DIAGNOSIS — A41.9 SEPTICEMIA (HCC): Primary | ICD-10-CM

## 2023-09-05 DIAGNOSIS — I95.9 HYPOTENSION, UNSPECIFIED HYPOTENSION TYPE: ICD-10-CM

## 2023-09-05 DIAGNOSIS — I65.23 CAROTID STENOSIS, BILATERAL: ICD-10-CM

## 2023-09-05 PROBLEM — N17.9 AKI (ACUTE KIDNEY INJURY) (HCC): Status: ACTIVE | Noted: 2023-09-05

## 2023-09-05 LAB
ANION GAP SERPL CALCULATED.3IONS-SCNC: 28 MMOL/L (ref 9–17)
BACTERIA URNS QL MICRO: NORMAL
BASOPHILS # BLD: 0.03 K/UL (ref 0–0.2)
BASOPHILS # BLD: 0.05 K/UL (ref 0–0.2)
BASOPHILS NFR BLD: 0 % (ref 0–2)
BASOPHILS NFR BLD: 0 % (ref 0–2)
BILIRUB UR QL STRIP: NEGATIVE
BUN BLD-MCNC: 39 MG/DL (ref 8–26)
BUN SERPL-MCNC: 101 MG/DL (ref 6–20)
CA-I BLD-SCNC: 0.43 MMOL/L (ref 1.15–1.33)
CALCIUM SERPL-MCNC: 7.5 MG/DL (ref 8.6–10.4)
CASTS #/AREA URNS LPF: NORMAL /LPF (ref 0–2)
CHLORIDE BLD-SCNC: >140 MMOL/L (ref 98–107)
CHLORIDE SERPL-SCNC: 94 MMOL/L (ref 98–107)
CK SERPL-CCNC: 1149 U/L (ref 39–308)
CK SERPL-CCNC: 829 U/L (ref 39–308)
CLARITY UR: ABNORMAL
CO2 BLD CALC-SCNC: <5 MMOL/L (ref 22–30)
CO2 SERPL-SCNC: 10 MMOL/L (ref 20–31)
COLOR UR: YELLOW
COMMENT: ABNORMAL
CREAT SERPL-MCNC: 7.8 MG/DL (ref 0.7–1.2)
EGFR, POC: 25 ML/MIN/1.73M2
EOSINOPHIL # BLD: 0.05 K/UL (ref 0–0.44)
EOSINOPHIL # BLD: 0.08 K/UL (ref 0–0.44)
EOSINOPHILS RELATIVE PERCENT: 0 % (ref 1–4)
EOSINOPHILS RELATIVE PERCENT: 0 % (ref 1–4)
EPI CELLS #/AREA URNS HPF: NORMAL /HPF
ERYTHROCYTE [DISTWIDTH] IN BLOOD BY AUTOMATED COUNT: 14.1 % (ref 11.8–14.4)
ERYTHROCYTE [DISTWIDTH] IN BLOOD BY AUTOMATED COUNT: 14.1 % (ref 11.8–14.4)
GFR SERPL CREATININE-BSD FRML MDRD: 7 ML/MIN/1.73M2
GLUCOSE BLD-MCNC: 139 MG/DL (ref 75–110)
GLUCOSE BLD-MCNC: 141 MG/DL (ref 75–110)
GLUCOSE BLD-MCNC: 38 MG/DL (ref 75–110)
GLUCOSE BLD-MCNC: 53 MG/DL (ref 75–110)
GLUCOSE BLD-MCNC: <20 MG/DL (ref 74–100)
GLUCOSE SERPL-MCNC: 212 MG/DL (ref 70–99)
GLUCOSE UR STRIP-MCNC: NEGATIVE MG/DL
HCO3 VENOUS: 4.3 MMOL/L (ref 22–29)
HCT VFR BLD AUTO: 24 % (ref 40.7–50.3)
HCT VFR BLD AUTO: 29.5 % (ref 40.7–50.3)
HCT VFR BLD AUTO: <10 % (ref 41–53)
HGB BLD-MCNC: 8 G/DL (ref 13–17)
HGB BLD-MCNC: 9 G/DL (ref 13–17)
HGB UR QL STRIP.AUTO: ABNORMAL
IMM GRANULOCYTES # BLD AUTO: 0.11 K/UL (ref 0–0.3)
IMM GRANULOCYTES # BLD AUTO: 0.16 K/UL (ref 0–0.3)
IMM GRANULOCYTES NFR BLD: 1 %
IMM GRANULOCYTES NFR BLD: 1 %
INR PPP: 1.1
KETONES UR STRIP-MCNC: NEGATIVE MG/DL
LACTIC ACID, SEPSIS WHOLE BLOOD: 1.4 MMOL/L (ref 0.5–1.9)
LACTIC ACID, SEPSIS WHOLE BLOOD: 2.8 MMOL/L (ref 0.5–1.9)
LEUKOCYTE ESTERASE UR QL STRIP: NEGATIVE
LYMPHOCYTES NFR BLD: 1.37 K/UL (ref 1.1–3.7)
LYMPHOCYTES NFR BLD: 1.58 K/UL (ref 1.1–3.7)
LYMPHOCYTES RELATIVE PERCENT: 7 % (ref 24–43)
LYMPHOCYTES RELATIVE PERCENT: 8 % (ref 24–43)
MAGNESIUM SERPL-MCNC: 1.6 MG/DL (ref 1.6–2.6)
MCH RBC QN AUTO: 31.4 PG (ref 25.2–33.5)
MCH RBC QN AUTO: 32.8 PG (ref 25.2–33.5)
MCHC RBC AUTO-ENTMCNC: 30.5 G/DL (ref 28.4–34.8)
MCHC RBC AUTO-ENTMCNC: 33.3 G/DL (ref 28.4–34.8)
MCV RBC AUTO: 102.8 FL (ref 82.6–102.9)
MCV RBC AUTO: 98.4 FL (ref 82.6–102.9)
MONOCYTES NFR BLD: 0.96 K/UL (ref 0.1–1.2)
MONOCYTES NFR BLD: 1.16 K/UL (ref 0.1–1.2)
MONOCYTES NFR BLD: 5 % (ref 3–12)
MONOCYTES NFR BLD: 6 % (ref 3–12)
MYOGLOBIN SERPL-MCNC: 3528 NG/ML (ref 28–72)
MYOGLOBIN SERPL-MCNC: 6410 NG/ML (ref 28–72)
NEGATIVE BASE EXCESS, VEN: 23.1 MMOL/L (ref 0–2)
NEUTROPHILS NFR BLD: 85 % (ref 36–65)
NEUTROPHILS NFR BLD: 87 % (ref 36–65)
NEUTS SEG NFR BLD: 16.43 K/UL (ref 1.5–8.1)
NEUTS SEG NFR BLD: 17.96 K/UL (ref 1.5–8.1)
NITRITE UR QL STRIP: NEGATIVE
NRBC BLD-RTO: 0 PER 100 WBC
NRBC BLD-RTO: 0 PER 100 WBC
O2 SAT, VEN: 72.4 % (ref 60–85)
PCO2, VEN: 14 MM HG (ref 41–51)
PH UR STRIP: 5 [PH] (ref 5–8)
PH VENOUS: 7.09 (ref 7.32–7.43)
PLATELET # BLD AUTO: 412 K/UL (ref 138–453)
PLATELET # BLD AUTO: 472 K/UL (ref 138–453)
PMV BLD AUTO: 10.1 FL (ref 8.1–13.5)
PMV BLD AUTO: 10.3 FL (ref 8.1–13.5)
PO2, VEN: 50.3 MM HG (ref 30–50)
POC ANION GAP: ABNORMAL MMOL/L (ref 7–16)
POC CREATININE: 2.8 MG/DL (ref 0.51–1.19)
POC HEMOGLOBIN (CALC): ABNORMAL G/DL (ref 13.5–17.5)
POC LACTIC ACID: 0.5 MMOL/L (ref 0.56–1.39)
POTASSIUM BLD-SCNC: <1.5 MMOL/L (ref 3.5–4.5)
POTASSIUM SERPL-SCNC: 3.5 MMOL/L (ref 3.7–5.3)
PROT UR STRIP-MCNC: ABNORMAL MG/DL
PROTHROMBIN TIME: 14.2 SEC (ref 11.7–14.9)
RBC # BLD AUTO: 2.44 M/UL (ref 4.21–5.77)
RBC # BLD AUTO: 2.87 M/UL (ref 4.21–5.77)
RBC #/AREA URNS HPF: NORMAL /HPF (ref 0–2)
REASON FOR REJECTION: NORMAL
SODIUM BLD-SCNC: 153 MMOL/L (ref 138–146)
SODIUM SERPL-SCNC: 132 MMOL/L (ref 135–144)
SP GR UR STRIP: 1.02 (ref 1–1.03)
SPECIMEN SOURCE: NORMAL
TROPONIN I SERPL HS-MCNC: 196 NG/L (ref 0–22)
TROPONIN I SERPL HS-MCNC: 232 NG/L (ref 0–22)
UROBILINOGEN UR STRIP-ACNC: NORMAL EU/DL (ref 0–1)
WBC #/AREA URNS HPF: NORMAL /HPF (ref 0–5)
WBC OTHER # BLD: 19 K/UL (ref 3.5–11.3)
WBC OTHER # BLD: 21 K/UL (ref 3.5–11.3)
ZZ NTE CLEAN UP: ORDERED TEST: NORMAL

## 2023-09-05 PROCEDURE — 93005 ELECTROCARDIOGRAM TRACING: CPT | Performed by: INTERNAL MEDICINE

## 2023-09-05 PROCEDURE — 6360000002 HC RX W HCPCS: Performed by: STUDENT IN AN ORGANIZED HEALTH CARE EDUCATION/TRAINING PROGRAM

## 2023-09-05 PROCEDURE — 82570 ASSAY OF URINE CREATININE: CPT

## 2023-09-05 PROCEDURE — C1751 CATH, INF, PER/CENT/MIDLINE: HCPCS

## 2023-09-05 PROCEDURE — 82565 ASSAY OF CREATININE: CPT

## 2023-09-05 PROCEDURE — 80076 HEPATIC FUNCTION PANEL: CPT

## 2023-09-05 PROCEDURE — 82533 TOTAL CORTISOL: CPT

## 2023-09-05 PROCEDURE — 83874 ASSAY OF MYOGLOBIN: CPT

## 2023-09-05 PROCEDURE — 82607 VITAMIN B-12: CPT

## 2023-09-05 PROCEDURE — 83540 ASSAY OF IRON: CPT

## 2023-09-05 PROCEDURE — 2580000003 HC RX 258

## 2023-09-05 PROCEDURE — 99285 EMERGENCY DEPT VISIT HI MDM: CPT

## 2023-09-05 PROCEDURE — 84443 ASSAY THYROID STIM HORMONE: CPT

## 2023-09-05 PROCEDURE — 85045 AUTOMATED RETICULOCYTE COUNT: CPT

## 2023-09-05 PROCEDURE — 81015 MICROSCOPIC EXAM OF URINE: CPT

## 2023-09-05 PROCEDURE — 96368 THER/DIAG CONCURRENT INF: CPT

## 2023-09-05 PROCEDURE — 82330 ASSAY OF CALCIUM: CPT

## 2023-09-05 PROCEDURE — 82803 BLOOD GASES ANY COMBINATION: CPT

## 2023-09-05 PROCEDURE — 71045 X-RAY EXAM CHEST 1 VIEW: CPT

## 2023-09-05 PROCEDURE — 82947 ASSAY GLUCOSE BLOOD QUANT: CPT

## 2023-09-05 PROCEDURE — 82728 ASSAY OF FERRITIN: CPT

## 2023-09-05 PROCEDURE — 36620 INSERTION CATHETER ARTERY: CPT

## 2023-09-05 PROCEDURE — 83525 ASSAY OF INSULIN: CPT

## 2023-09-05 PROCEDURE — 80051 ELECTROLYTE PANEL: CPT

## 2023-09-05 PROCEDURE — 85025 COMPLETE CBC W/AUTO DIFF WBC: CPT

## 2023-09-05 PROCEDURE — 84520 ASSAY OF UREA NITROGEN: CPT

## 2023-09-05 PROCEDURE — 2500000003 HC RX 250 WO HCPCS

## 2023-09-05 PROCEDURE — 82550 ASSAY OF CK (CPK): CPT

## 2023-09-05 PROCEDURE — 84484 ASSAY OF TROPONIN QUANT: CPT

## 2023-09-05 PROCEDURE — 85014 HEMATOCRIT: CPT

## 2023-09-05 PROCEDURE — 87040 BLOOD CULTURE FOR BACTERIA: CPT

## 2023-09-05 PROCEDURE — 96375 TX/PRO/DX INJ NEW DRUG ADDON: CPT

## 2023-09-05 PROCEDURE — 96366 THER/PROPH/DIAG IV INF ADDON: CPT

## 2023-09-05 PROCEDURE — 83735 ASSAY OF MAGNESIUM: CPT

## 2023-09-05 PROCEDURE — 82746 ASSAY OF FOLIC ACID SERUM: CPT

## 2023-09-05 PROCEDURE — 96365 THER/PROPH/DIAG IV INF INIT: CPT

## 2023-09-05 PROCEDURE — 36569 INSJ PICC 5 YR+ W/O IMAGING: CPT

## 2023-09-05 PROCEDURE — 70450 CT HEAD/BRAIN W/O DYE: CPT

## 2023-09-05 PROCEDURE — 83605 ASSAY OF LACTIC ACID: CPT

## 2023-09-05 PROCEDURE — 81003 URINALYSIS AUTO W/O SCOPE: CPT

## 2023-09-05 PROCEDURE — 83550 IRON BINDING TEST: CPT

## 2023-09-05 PROCEDURE — 02HV33Z INSERTION OF INFUSION DEVICE INTO SUPERIOR VENA CAVA, PERCUTANEOUS APPROACH: ICD-10-PCS | Performed by: EMERGENCY MEDICINE

## 2023-09-05 PROCEDURE — 85610 PROTHROMBIN TIME: CPT

## 2023-09-05 PROCEDURE — 84681 ASSAY OF C-PEPTIDE: CPT

## 2023-09-05 PROCEDURE — 6360000002 HC RX W HCPCS

## 2023-09-05 PROCEDURE — 2000000000 HC ICU R&B

## 2023-09-05 PROCEDURE — 80048 BASIC METABOLIC PNL TOTAL CA: CPT

## 2023-09-05 PROCEDURE — 76937 US GUIDE VASCULAR ACCESS: CPT

## 2023-09-05 PROCEDURE — 84300 ASSAY OF URINE SODIUM: CPT

## 2023-09-05 PROCEDURE — 82010 KETONE BODYS QUAN: CPT

## 2023-09-05 PROCEDURE — 87086 URINE CULTURE/COLONY COUNT: CPT

## 2023-09-05 PROCEDURE — 87205 SMEAR GRAM STAIN: CPT

## 2023-09-05 RX ORDER — MAGNESIUM SULFATE IN WATER 40 MG/ML
2000 INJECTION, SOLUTION INTRAVENOUS ONCE
Status: COMPLETED | OUTPATIENT
Start: 2023-09-05 | End: 2023-09-06

## 2023-09-05 RX ORDER — PANTOPRAZOLE SODIUM 40 MG/1
40 TABLET, DELAYED RELEASE ORAL
Status: DISCONTINUED | OUTPATIENT
Start: 2023-09-06 | End: 2023-09-07

## 2023-09-05 RX ORDER — SODIUM CHLORIDE 9 MG/ML
INJECTION, SOLUTION INTRAVENOUS PRN
Status: DISCONTINUED | OUTPATIENT
Start: 2023-09-05 | End: 2023-09-18 | Stop reason: HOSPADM

## 2023-09-05 RX ORDER — DEXTROSE MONOHYDRATE 50 MG/ML
INJECTION, SOLUTION INTRAVENOUS
Status: DISCONTINUED
Start: 2023-09-05 | End: 2023-09-06

## 2023-09-05 RX ORDER — ACETAMINOPHEN 325 MG/1
650 TABLET ORAL EVERY 6 HOURS PRN
Status: DISCONTINUED | OUTPATIENT
Start: 2023-09-05 | End: 2023-09-18 | Stop reason: HOSPADM

## 2023-09-05 RX ORDER — POLYETHYLENE GLYCOL 3350 17 G/17G
17 POWDER, FOR SOLUTION ORAL DAILY PRN
Status: DISCONTINUED | OUTPATIENT
Start: 2023-09-05 | End: 2023-09-18 | Stop reason: HOSPADM

## 2023-09-05 RX ORDER — 0.9 % SODIUM CHLORIDE 0.9 %
1700 INTRAVENOUS SOLUTION INTRAVENOUS ONCE
Status: COMPLETED | OUTPATIENT
Start: 2023-09-05 | End: 2023-09-05

## 2023-09-05 RX ORDER — DEXTROSE MONOHYDRATE 25 G/50ML
INJECTION, SOLUTION INTRAVENOUS
Status: COMPLETED
Start: 2023-09-05 | End: 2023-09-05

## 2023-09-05 RX ORDER — DEXTROSE MONOHYDRATE, SODIUM CHLORIDE, AND POTASSIUM CHLORIDE 50; 1.49; 4.5 G/1000ML; G/1000ML; G/1000ML
INJECTION, SOLUTION INTRAVENOUS CONTINUOUS
Status: DISCONTINUED | OUTPATIENT
Start: 2023-09-05 | End: 2023-09-05

## 2023-09-05 RX ORDER — ONDANSETRON 2 MG/ML
4 INJECTION INTRAMUSCULAR; INTRAVENOUS ONCE
Status: COMPLETED | OUTPATIENT
Start: 2023-09-05 | End: 2023-09-05

## 2023-09-05 RX ORDER — IPRATROPIUM BROMIDE AND ALBUTEROL SULFATE 2.5; .5 MG/3ML; MG/3ML
1 SOLUTION RESPIRATORY (INHALATION) EVERY 4 HOURS PRN
Status: DISCONTINUED | OUTPATIENT
Start: 2023-09-05 | End: 2023-09-18 | Stop reason: HOSPADM

## 2023-09-05 RX ORDER — DEXTROSE, SODIUM CHLORIDE, SODIUM LACTATE, POTASSIUM CHLORIDE, AND CALCIUM CHLORIDE 5; .6; .31; .03; .02 G/100ML; G/100ML; G/100ML; G/100ML; G/100ML
INJECTION, SOLUTION INTRAVENOUS CONTINUOUS
Status: DISCONTINUED | OUTPATIENT
Start: 2023-09-05 | End: 2023-09-05

## 2023-09-05 RX ORDER — ONDANSETRON 4 MG/1
4 TABLET, ORALLY DISINTEGRATING ORAL EVERY 8 HOURS PRN
Status: DISCONTINUED | OUTPATIENT
Start: 2023-09-05 | End: 2023-09-18 | Stop reason: HOSPADM

## 2023-09-05 RX ORDER — DEXTROSE MONOHYDRATE 50 MG/ML
INJECTION, SOLUTION INTRAVENOUS CONTINUOUS
Status: DISCONTINUED | OUTPATIENT
Start: 2023-09-05 | End: 2023-09-05

## 2023-09-05 RX ORDER — ACETAMINOPHEN 650 MG/1
650 SUPPOSITORY RECTAL EVERY 6 HOURS PRN
Status: DISCONTINUED | OUTPATIENT
Start: 2023-09-05 | End: 2023-09-18 | Stop reason: HOSPADM

## 2023-09-05 RX ORDER — DEXTROSE MONOHYDRATE 25 G/50ML
25 INJECTION, SOLUTION INTRAVENOUS ONCE
Status: COMPLETED | OUTPATIENT
Start: 2023-09-05 | End: 2023-09-05

## 2023-09-05 RX ORDER — ASPIRIN 81 MG/1
81 TABLET, CHEWABLE ORAL DAILY
Status: DISCONTINUED | OUTPATIENT
Start: 2023-09-06 | End: 2023-09-18 | Stop reason: HOSPADM

## 2023-09-05 RX ORDER — SODIUM CHLORIDE 0.9 % (FLUSH) 0.9 %
5-40 SYRINGE (ML) INJECTION PRN
Status: DISCONTINUED | OUTPATIENT
Start: 2023-09-05 | End: 2023-09-18 | Stop reason: HOSPADM

## 2023-09-05 RX ORDER — DEXTROSE AND SODIUM CHLORIDE 5; .9 G/100ML; G/100ML
INJECTION, SOLUTION INTRAVENOUS
Status: DISCONTINUED
Start: 2023-09-05 | End: 2023-09-05

## 2023-09-05 RX ORDER — SODIUM CHLORIDE 0.9 % (FLUSH) 0.9 %
5-40 SYRINGE (ML) INJECTION EVERY 12 HOURS SCHEDULED
Status: DISCONTINUED | OUTPATIENT
Start: 2023-09-05 | End: 2023-09-18 | Stop reason: HOSPADM

## 2023-09-05 RX ORDER — ONDANSETRON 2 MG/ML
4 INJECTION INTRAMUSCULAR; INTRAVENOUS EVERY 6 HOURS PRN
Status: DISCONTINUED | OUTPATIENT
Start: 2023-09-05 | End: 2023-09-18 | Stop reason: HOSPADM

## 2023-09-05 RX ORDER — DEXTROSE MONOHYDRATE 100 MG/ML
INJECTION, SOLUTION INTRAVENOUS
Status: DISCONTINUED
Start: 2023-09-05 | End: 2023-09-05

## 2023-09-05 RX ORDER — POTASSIUM CHLORIDE 7.45 MG/ML
10 INJECTION INTRAVENOUS
Status: COMPLETED | OUTPATIENT
Start: 2023-09-06 | End: 2023-09-06

## 2023-09-05 RX ORDER — NOREPINEPHRINE BITARTRATE 0.06 MG/ML
1-100 INJECTION, SOLUTION INTRAVENOUS CONTINUOUS
Status: DISCONTINUED | OUTPATIENT
Start: 2023-09-05 | End: 2023-09-08

## 2023-09-05 RX ORDER — 0.9 % SODIUM CHLORIDE 0.9 %
500 INTRAVENOUS SOLUTION INTRAVENOUS ONCE
Status: COMPLETED | OUTPATIENT
Start: 2023-09-05 | End: 2023-09-05

## 2023-09-05 RX ORDER — LEVETIRACETAM 500 MG/1
500 TABLET ORAL 2 TIMES DAILY
Status: DISCONTINUED | OUTPATIENT
Start: 2023-09-06 | End: 2023-09-18 | Stop reason: HOSPADM

## 2023-09-05 RX ORDER — DEXTROSE MONOHYDRATE 50 MG/ML
INJECTION, SOLUTION INTRAVENOUS
Status: COMPLETED
Start: 2023-09-05 | End: 2023-09-05

## 2023-09-05 RX ADMIN — HYDROCORTISONE SODIUM SUCCINATE 100 MG: 100 INJECTION, POWDER, FOR SOLUTION INTRAMUSCULAR; INTRAVENOUS at 23:18

## 2023-09-05 RX ADMIN — Medication 5 MCG/MIN: at 19:46

## 2023-09-05 RX ADMIN — POTASSIUM CHLORIDE 10 MEQ: 10 INJECTION, SOLUTION INTRAVENOUS at 23:44

## 2023-09-05 RX ADMIN — PIPERACILLIN AND TAZOBACTAM 3375 MG: 3; .375 INJECTION, POWDER, LYOPHILIZED, FOR SOLUTION INTRAVENOUS at 17:49

## 2023-09-05 RX ADMIN — DEXTROSE MONOHYDRATE: 50 INJECTION, SOLUTION INTRAVENOUS at 22:05

## 2023-09-05 RX ADMIN — MAGNESIUM SULFATE HEPTAHYDRATE 2000 MG: 40 INJECTION, SOLUTION INTRAVENOUS at 23:48

## 2023-09-05 RX ADMIN — DEXTROSE MONOHYDRATE 25 G: 25 INJECTION, SOLUTION INTRAVENOUS at 19:48

## 2023-09-05 RX ADMIN — VANCOMYCIN HYDROCHLORIDE 750 MG: 750 INJECTION, POWDER, LYOPHILIZED, FOR SOLUTION INTRAVENOUS at 19:01

## 2023-09-05 RX ADMIN — SODIUM CHLORIDE 1700 ML: 9 INJECTION, SOLUTION INTRAVENOUS at 17:49

## 2023-09-05 RX ADMIN — ONDANSETRON 4 MG: 2 INJECTION INTRAMUSCULAR; INTRAVENOUS at 21:59

## 2023-09-05 RX ADMIN — SODIUM CHLORIDE 500 ML: 0.9 INJECTION, SOLUTION INTRAVENOUS at 15:56

## 2023-09-05 ASSESSMENT — ENCOUNTER SYMPTOMS
SHORTNESS OF BREATH: 0
DIARRHEA: 0
RHINORRHEA: 0
SORE THROAT: 0
VOMITING: 0
BACK PAIN: 0
WHEEZING: 0
EYE PAIN: 0
EYE REDNESS: 0
NAUSEA: 0
COUGH: 0
ABDOMINAL DISTENTION: 0
ABDOMINAL PAIN: 0
CHEST TIGHTNESS: 0

## 2023-09-05 NOTE — ED NOTES
Pt. Resting on stretcher, eyes open, RR even and non-labored  Pt.  Updated on POC  Will continue to monitor   Family at bedside      Soni Rodriguez RN  09/05/23 5682

## 2023-09-05 NOTE — ED NOTES
Writer enters room to draw repeat specimens from pt. Pt. Found slid down in bed  Writer educates pt. We will reposition and change linens   While writer attempting to draw blood pt. Family approaches room  Pt. Family says \"he's senior living off the fucking bed, the beds wet and theres 12 people sitting there making big money not doing a fucking thing, this is fucking ridiculous. Then we have this fucking asshole in here trying to draw blood while the bed is wet\"  pt. Apologetic to writer   As writer educates pt. We are working on repositioning after blood work   Pt. Family throws call light  towards pt. Then while writer is attempting to start PIV pt. Family grabs pt. By the arms and repositions him at the top of the bed  Pt. Family then enters room fo another patient and says \"file a fucking complaint just like we are\"   As writer is talking with patient, pt. Family states \"these fucking piece of shit ass holes\"  Pt. Educated pt. Family being aggressive towards staff and would be asked to not visit in the ED   Pt. Verablized understanding stating they are just his \"friends\". Pt.  Very apologetic to writer   Pt. Repositioned in bed by Molly Adames, MARIA E   Call light within reach, side rails up x2, linens changed   Pt.  Denies further needs at this time      Naty Romano RN  09/05/23 1654

## 2023-09-06 ENCOUNTER — APPOINTMENT (OUTPATIENT)
Dept: ULTRASOUND IMAGING | Age: 58
DRG: 720 | End: 2023-09-06
Payer: MEDICAID

## 2023-09-06 ENCOUNTER — APPOINTMENT (OUTPATIENT)
Dept: MRI IMAGING | Age: 58
DRG: 720 | End: 2023-09-06
Payer: MEDICAID

## 2023-09-06 ENCOUNTER — APPOINTMENT (OUTPATIENT)
Dept: INTERVENTIONAL RADIOLOGY/VASCULAR | Age: 58
DRG: 720 | End: 2023-09-06
Payer: MEDICAID

## 2023-09-06 PROBLEM — A41.9 SEPTICEMIA (HCC): Status: ACTIVE | Noted: 2023-09-06

## 2023-09-06 PROBLEM — R29.898 LUE WEAKNESS: Status: ACTIVE | Noted: 2023-09-06

## 2023-09-06 PROBLEM — E87.20 METABOLIC ACIDOSIS: Status: ACTIVE | Noted: 2023-09-06

## 2023-09-06 PROBLEM — E43 SEVERE MALNUTRITION (HCC): Chronic | Status: ACTIVE | Noted: 2023-09-06

## 2023-09-06 LAB
ALBUMIN SERPL-MCNC: 1.8 G/DL (ref 3.5–5.2)
ALBUMIN SERPL-MCNC: 2.9 G/DL (ref 3.5–5.2)
ALBUMIN SERPL-MCNC: 3.1 G/DL (ref 3.5–5.2)
ALBUMIN/GLOB SERPL: 0.9 {RATIO} (ref 1–2.5)
ALBUMIN/GLOB SERPL: 0.9 {RATIO} (ref 1–2.5)
ALBUMIN/GLOB SERPL: 1.1 {RATIO} (ref 1–2.5)
ALP SERPL-CCNC: 32 U/L (ref 40–129)
ALP SERPL-CCNC: 51 U/L (ref 40–129)
ALP SERPL-CCNC: 55 U/L (ref 40–129)
ALT SERPL-CCNC: 20 U/L (ref 5–41)
ALT SERPL-CCNC: 29 U/L (ref 5–41)
ALT SERPL-CCNC: 32 U/L (ref 5–41)
ANION GAP SERPL CALCULATED.3IONS-SCNC: 13 MMOL/L (ref 9–17)
ANION GAP SERPL CALCULATED.3IONS-SCNC: 20 MMOL/L (ref 9–17)
ANION GAP SERPL CALCULATED.3IONS-SCNC: 21 MMOL/L (ref 9–17)
ANION GAP SERPL CALCULATED.3IONS-SCNC: 25 MMOL/L (ref 9–17)
ANION GAP SERPL CALCULATED.3IONS-SCNC: 26 MMOL/L (ref 9–17)
AST SERPL-CCNC: 46 U/L
AST SERPL-CCNC: 66 U/L
AST SERPL-CCNC: 66 U/L
B-OH-BUTYR SERPL-MCNC: 1.39 MMOL/L (ref 0.02–0.27)
BASOPHILS # BLD: 0.03 K/UL (ref 0–0.2)
BASOPHILS NFR BLD: 0 % (ref 0–2)
BILIRUB DIRECT SERPL-MCNC: 0.1 MG/DL
BILIRUB DIRECT SERPL-MCNC: <0.1 MG/DL
BILIRUB DIRECT SERPL-MCNC: <0.1 MG/DL
BILIRUB INDIRECT SERPL-MCNC: 0.1 MG/DL (ref 0–1)
BILIRUB INDIRECT SERPL-MCNC: ABNORMAL MG/DL (ref 0–1)
BILIRUB INDIRECT SERPL-MCNC: ABNORMAL MG/DL (ref 0–1)
BILIRUB SERPL-MCNC: 0.2 MG/DL (ref 0.3–1.2)
BILIRUB SERPL-MCNC: 0.2 MG/DL (ref 0.3–1.2)
BILIRUB SERPL-MCNC: <0.1 MG/DL (ref 0.3–1.2)
BUN BLD-MCNC: 112 MG/DL (ref 8–26)
BUN SERPL-MCNC: 106 MG/DL (ref 6–20)
BUN SERPL-MCNC: 107 MG/DL (ref 6–20)
BUN SERPL-MCNC: 59 MG/DL (ref 6–20)
BUN SERPL-MCNC: 75 MG/DL (ref 6–20)
BUN SERPL-MCNC: 99 MG/DL (ref 6–20)
C PEPTIDE SERPL-MCNC: 3.4 NG/ML (ref 1.1–4.4)
CA-I BLD-SCNC: 0.96 MMOL/L (ref 1.15–1.33)
CALCIUM SERPL-MCNC: 5.1 MG/DL (ref 8.6–10.4)
CALCIUM SERPL-MCNC: 7.2 MG/DL (ref 8.6–10.4)
CALCIUM SERPL-MCNC: 7.6 MG/DL (ref 8.6–10.4)
CALCIUM SERPL-MCNC: 7.8 MG/DL (ref 8.6–10.4)
CALCIUM SERPL-MCNC: 7.9 MG/DL (ref 8.6–10.4)
CHLORIDE BLD-SCNC: 107 MMOL/L (ref 98–107)
CHLORIDE SERPL-SCNC: 113 MMOL/L (ref 98–107)
CHLORIDE SERPL-SCNC: 92 MMOL/L (ref 98–107)
CHLORIDE SERPL-SCNC: 95 MMOL/L (ref 98–107)
CHLORIDE SERPL-SCNC: 95 MMOL/L (ref 98–107)
CHLORIDE SERPL-SCNC: 99 MMOL/L (ref 98–107)
CHP ED QC CHECK: YES
CK SERPL-CCNC: 1298 U/L (ref 39–308)
CO2 BLD CALC-SCNC: 12 MMOL/L (ref 22–30)
CO2 SERPL-SCNC: 12 MMOL/L (ref 20–31)
CO2 SERPL-SCNC: 17 MMOL/L (ref 20–31)
CO2 SERPL-SCNC: 25 MMOL/L (ref 20–31)
CO2 SERPL-SCNC: 6 MMOL/L (ref 20–31)
CO2 SERPL-SCNC: 9 MMOL/L (ref 20–31)
CORTIS SERPL-MCNC: 16.9 UG/DL (ref 2.7–18.4)
CREAT SERPL-MCNC: 4.3 MG/DL (ref 0.7–1.2)
CREAT SERPL-MCNC: 5.3 MG/DL (ref 0.7–1.2)
CREAT SERPL-MCNC: 6.9 MG/DL (ref 0.7–1.2)
CREAT SERPL-MCNC: 7.3 MG/DL (ref 0.7–1.2)
CREAT SERPL-MCNC: 7.4 MG/DL (ref 0.7–1.2)
CREAT UR-MCNC: 260.7 MG/DL (ref 39–259)
CREAT UR-MCNC: 27.6 MG/DL (ref 39–259)
EGFR, POC: 7 ML/MIN/1.73M2
EKG ATRIAL RATE: 104 BPM
EKG P AXIS: 72 DEGREES
EKG P-R INTERVAL: 152 MS
EKG Q-T INTERVAL: 340 MS
EKG QRS DURATION: 94 MS
EKG QTC CALCULATION (BAZETT): 447 MS
EKG R AXIS: 61 DEGREES
EKG T AXIS: 94 DEGREES
EKG VENTRICULAR RATE: 104 BPM
EOSINOPHIL # BLD: 0.03 K/UL (ref 0–0.44)
EOSINOPHIL,URINE: NORMAL
EOSINOPHILS RELATIVE PERCENT: 0 % (ref 1–4)
ERYTHROCYTE [DISTWIDTH] IN BLOOD BY AUTOMATED COUNT: 14.1 % (ref 11.8–14.4)
FERRITIN SERPL-MCNC: 979 NG/ML (ref 30–400)
FOLATE SERPL-MCNC: 3.9 NG/ML
FREE KAPPA/LAMBDA RATIO: 1.34 (ref 0.26–1.65)
GFR SERPL CREATININE-BSD FRML MDRD: 12 ML/MIN/1.73M2
GFR SERPL CREATININE-BSD FRML MDRD: 15 ML/MIN/1.73M2
GFR SERPL CREATININE-BSD FRML MDRD: 8 ML/MIN/1.73M2
GFR SERPL CREATININE-BSD FRML MDRD: 8 ML/MIN/1.73M2
GFR SERPL CREATININE-BSD FRML MDRD: 9 ML/MIN/1.73M2
GLUCOSE BLD-MCNC: 157 MG/DL
GLUCOSE BLD-MCNC: 157 MG/DL (ref 75–110)
GLUCOSE BLD-MCNC: 163 MG/DL (ref 75–110)
GLUCOSE BLD-MCNC: 176 MG/DL (ref 74–100)
GLUCOSE BLD-MCNC: 178 MG/DL (ref 75–110)
GLUCOSE BLD-MCNC: 204 MG/DL (ref 75–110)
GLUCOSE SERPL-MCNC: 136 MG/DL (ref 70–99)
GLUCOSE SERPL-MCNC: 163 MG/DL (ref 70–99)
GLUCOSE SERPL-MCNC: 164 MG/DL (ref 70–99)
GLUCOSE SERPL-MCNC: 303 MG/DL (ref 70–99)
GLUCOSE SERPL-MCNC: 315 MG/DL (ref 70–99)
HCT VFR BLD AUTO: 21.2 % (ref 40.7–50.3)
HCT VFR BLD AUTO: 26 % (ref 41–53)
HCT VFR BLD AUTO: 27.9 % (ref 40.7–50.3)
HGB BLD-MCNC: 7.2 G/DL (ref 13–17)
HGB BLD-MCNC: 8.7 G/DL (ref 13–17)
IMM GRANULOCYTES # BLD AUTO: 0.12 K/UL (ref 0–0.3)
IMM GRANULOCYTES NFR BLD: 1 %
IMM RETICS NFR: 17.5 % (ref 2.7–18.3)
IMM RETICS NFR: 19.8 % (ref 2.7–18.3)
INSULIN REFERENCE RANGE:: NORMAL
INSULIN: 9 MU/L
IRON SATN MFR SERPL: 13 % (ref 20–55)
IRON SERPL-MCNC: 17 UG/DL (ref 59–158)
KAPPA LC FREE SER-MCNC: 106.8 MG/L (ref 3.7–19.4)
LAMBDA LC FREE SERPL-MCNC: 79.5 MG/L (ref 5.7–26.3)
LEVETIRACETAM SERPL-MCNC: 27 UG/ML
LYMPHOCYTES NFR BLD: 0.73 K/UL (ref 1.1–3.7)
LYMPHOCYTES RELATIVE PERCENT: 4 % (ref 24–43)
MAGNESIUM SERPL-MCNC: 1.1 MG/DL (ref 1.6–2.6)
MAGNESIUM SERPL-MCNC: 1.7 MG/DL (ref 1.6–2.6)
MAGNESIUM SERPL-MCNC: 2.2 MG/DL (ref 1.6–2.6)
MAGNESIUM SERPL-MCNC: 2.8 MG/DL (ref 1.6–2.6)
MCH RBC QN AUTO: 31.2 PG (ref 25.2–33.5)
MCHC RBC AUTO-ENTMCNC: 31.2 G/DL (ref 28.4–34.8)
MCV RBC AUTO: 100 FL (ref 82.6–102.9)
MICROORGANISM SPEC CULT: NO GROWTH
MONOCYTES NFR BLD: 0.49 K/UL (ref 0.1–1.2)
MONOCYTES NFR BLD: 2 % (ref 3–12)
MRSA, DNA, NASAL: NEGATIVE
NEGATIVE BASE EXCESS, ART: 13.2 MMOL/L (ref 0–2)
NEUTROPHILS NFR BLD: 93 % (ref 36–65)
NEUTS SEG NFR BLD: 18.95 K/UL (ref 1.5–8.1)
NRBC BLD-RTO: 0 PER 100 WBC
O2 DELIVERY DEVICE: ABNORMAL
PATH REV BLD -IMP: NORMAL
PHOSPHATE SERPL-MCNC: 6.3 MG/DL (ref 2.5–4.5)
PLATELET # BLD AUTO: 422 K/UL (ref 138–453)
PMV BLD AUTO: 9.9 FL (ref 8.1–13.5)
POC ANION GAP: 12 MMOL/L (ref 7–16)
POC CREATININE: 8.3 MG/DL (ref 0.51–1.19)
POC HCO3: 12.2 MMOL/L (ref 21–28)
POC HEMOGLOBIN (CALC): 9 G/DL (ref 13.5–17.5)
POC LACTIC ACID: 0.6 MMOL/L (ref 0.56–1.39)
POC O2 SATURATION: 94.9 % (ref 94–98)
POC PCO2: 26.2 MM HG (ref 35–48)
POC PH: 7.28 (ref 7.35–7.45)
POC PO2: 82.7 MM HG (ref 83–108)
POTASSIUM BLD-SCNC: 3.5 MMOL/L (ref 3.5–4.5)
POTASSIUM SERPL-SCNC: 2.6 MMOL/L (ref 3.7–5.3)
POTASSIUM SERPL-SCNC: 3.3 MMOL/L (ref 3.7–5.3)
POTASSIUM SERPL-SCNC: 3.5 MMOL/L (ref 3.7–5.3)
POTASSIUM SERPL-SCNC: 3.7 MMOL/L (ref 3.7–5.3)
POTASSIUM SERPL-SCNC: 4.2 MMOL/L (ref 3.7–5.3)
PROT SERPL-MCNC: 3.9 G/DL (ref 6.4–8.3)
PROT SERPL-MCNC: 5.8 G/DL (ref 6.4–8.3)
PROT SERPL-MCNC: 6.1 G/DL (ref 6.4–8.3)
RBC # BLD AUTO: 2.79 M/UL (ref 4.21–5.77)
RETIC HEMOGLOBIN: 36.7 PG (ref 28.2–35.7)
RETIC HEMOGLOBIN: 36.9 PG (ref 28.2–35.7)
RETICS # AUTO: 0.05 M/UL (ref 0.03–0.08)
RETICS # AUTO: 0.06 M/UL (ref 0.03–0.08)
RETICS/RBC NFR AUTO: 2.4 % (ref 0.5–1.9)
RETICS/RBC NFR AUTO: 2.4 % (ref 0.5–1.9)
SAMPLE SITE: ABNORMAL
SODIUM BLD-SCNC: 130 MMOL/L (ref 138–146)
SODIUM SERPL-SCNC: 129 MMOL/L (ref 135–144)
SODIUM SERPL-SCNC: 130 MMOL/L (ref 135–144)
SODIUM SERPL-SCNC: 132 MMOL/L (ref 135–144)
SODIUM SERPL-SCNC: 137 MMOL/L (ref 135–144)
SODIUM SERPL-SCNC: 140 MMOL/L (ref 135–144)
SODIUM UR-SCNC: 22 MMOL/L
SPECIMEN DESCRIPTION: NORMAL
SPECIMEN DESCRIPTION: NORMAL
SURGICAL PATHOLOGY REPORT: NORMAL
TIBC SERPL-MCNC: 129 UG/DL (ref 250–450)
TOTAL PROTEIN, URINE: 19 MG/DL
TROPONIN I SERPL HS-MCNC: 172 NG/L (ref 0–22)
TROPONIN I SERPL HS-MCNC: 176 NG/L (ref 0–22)
TROPONIN I SERPL HS-MCNC: 181 NG/L (ref 0–22)
TSH SERPL DL<=0.05 MIU/L-ACNC: 0.34 UIU/ML (ref 0.3–5)
TSH SERPL DL<=0.05 MIU/L-ACNC: 0.44 UIU/ML (ref 0.3–5)
UNSATURATED IRON BINDING CAPACITY: 112 UG/DL (ref 112–347)
URINE TOTAL PROTEIN CREATININE RATIO: 0.69 (ref 0–0.2)
VIT B12 SERPL-MCNC: 1175 PG/ML (ref 232–1245)
WBC OTHER # BLD: 20.4 K/UL (ref 3.5–11.3)

## 2023-09-06 PROCEDURE — 4A133B1 MONITORING OF ARTERIAL PRESSURE, PERIPHERAL, PERCUTANEOUS APPROACH: ICD-10-PCS | Performed by: STUDENT IN AN ORGANIZED HEALTH CARE EDUCATION/TRAINING PROGRAM

## 2023-09-06 PROCEDURE — 03HY32Z INSERTION OF MONITORING DEVICE INTO UPPER ARTERY, PERCUTANEOUS APPROACH: ICD-10-PCS | Performed by: STUDENT IN AN ORGANIZED HEALTH CARE EDUCATION/TRAINING PROGRAM

## 2023-09-06 PROCEDURE — 87040 BLOOD CULTURE FOR BACTERIA: CPT

## 2023-09-06 PROCEDURE — 6360000002 HC RX W HCPCS: Performed by: STUDENT IN AN ORGANIZED HEALTH CARE EDUCATION/TRAINING PROGRAM

## 2023-09-06 PROCEDURE — 84156 ASSAY OF PROTEIN URINE: CPT

## 2023-09-06 PROCEDURE — 82565 ASSAY OF CREATININE: CPT

## 2023-09-06 PROCEDURE — 02H633Z INSERTION OF INFUSION DEVICE INTO RIGHT ATRIUM, PERCUTANEOUS APPROACH: ICD-10-PCS | Performed by: INTERNAL MEDICINE

## 2023-09-06 PROCEDURE — 76770 US EXAM ABDO BACK WALL COMP: CPT

## 2023-09-06 PROCEDURE — 36415 COLL VENOUS BLD VENIPUNCTURE: CPT

## 2023-09-06 PROCEDURE — 85018 HEMOGLOBIN: CPT

## 2023-09-06 PROCEDURE — 80177 DRUG SCRN QUAN LEVETIRACETAM: CPT

## 2023-09-06 PROCEDURE — 84484 ASSAY OF TROPONIN QUANT: CPT

## 2023-09-06 PROCEDURE — 99222 1ST HOSP IP/OBS MODERATE 55: CPT | Performed by: PSYCHIATRY & NEUROLOGY

## 2023-09-06 PROCEDURE — 5A1D70Z PERFORMANCE OF URINARY FILTRATION, INTERMITTENT, LESS THAN 6 HOURS PER DAY: ICD-10-PCS | Performed by: INTERNAL MEDICINE

## 2023-09-06 PROCEDURE — 6360000002 HC RX W HCPCS

## 2023-09-06 PROCEDURE — 6370000000 HC RX 637 (ALT 250 FOR IP)

## 2023-09-06 PROCEDURE — 90935 HEMODIALYSIS ONE EVALUATION: CPT

## 2023-09-06 PROCEDURE — 6370000000 HC RX 637 (ALT 250 FOR IP): Performed by: STUDENT IN AN ORGANIZED HEALTH CARE EDUCATION/TRAINING PROGRAM

## 2023-09-06 PROCEDURE — 76937 US GUIDE VASCULAR ACCESS: CPT

## 2023-09-06 PROCEDURE — 84520 ASSAY OF UREA NITROGEN: CPT

## 2023-09-06 PROCEDURE — 36600 WITHDRAWAL OF ARTERIAL BLOOD: CPT

## 2023-09-06 PROCEDURE — 80048 BASIC METABOLIC PNL TOTAL CA: CPT

## 2023-09-06 PROCEDURE — 4A133J1 MONITORING OF ARTERIAL PULSE, PERIPHERAL, PERCUTANEOUS APPROACH: ICD-10-PCS | Performed by: STUDENT IN AN ORGANIZED HEALTH CARE EDUCATION/TRAINING PROGRAM

## 2023-09-06 PROCEDURE — 84100 ASSAY OF PHOSPHORUS: CPT

## 2023-09-06 PROCEDURE — C1894 INTRO/SHEATH, NON-LASER: HCPCS

## 2023-09-06 PROCEDURE — 2500000003 HC RX 250 WO HCPCS: Performed by: STUDENT IN AN ORGANIZED HEALTH CARE EDUCATION/TRAINING PROGRAM

## 2023-09-06 PROCEDURE — 6360000002 HC RX W HCPCS: Performed by: INTERNAL MEDICINE

## 2023-09-06 PROCEDURE — 93010 ELECTROCARDIOGRAM REPORT: CPT | Performed by: INTERNAL MEDICINE

## 2023-09-06 PROCEDURE — 2580000003 HC RX 258: Performed by: STUDENT IN AN ORGANIZED HEALTH CARE EDUCATION/TRAINING PROGRAM

## 2023-09-06 PROCEDURE — 85014 HEMATOCRIT: CPT

## 2023-09-06 PROCEDURE — 85025 COMPLETE CBC W/AUTO DIFF WBC: CPT

## 2023-09-06 PROCEDURE — 94640 AIRWAY INHALATION TREATMENT: CPT

## 2023-09-06 PROCEDURE — 99223 1ST HOSP IP/OBS HIGH 75: CPT | Performed by: INTERNAL MEDICINE

## 2023-09-06 PROCEDURE — 99291 CRITICAL CARE FIRST HOUR: CPT | Performed by: INTERNAL MEDICINE

## 2023-09-06 PROCEDURE — 82947 ASSAY GLUCOSE BLOOD QUANT: CPT

## 2023-09-06 PROCEDURE — 87641 MR-STAPH DNA AMP PROBE: CPT

## 2023-09-06 PROCEDURE — 83521 IG LIGHT CHAINS FREE EACH: CPT

## 2023-09-06 PROCEDURE — 80051 ELECTROLYTE PANEL: CPT

## 2023-09-06 PROCEDURE — 82570 ASSAY OF URINE CREATININE: CPT

## 2023-09-06 PROCEDURE — 82550 ASSAY OF CK (CPK): CPT

## 2023-09-06 PROCEDURE — 80076 HEPATIC FUNCTION PANEL: CPT

## 2023-09-06 PROCEDURE — 2000000000 HC ICU R&B

## 2023-09-06 PROCEDURE — 77001 FLUOROGUIDE FOR VEIN DEVICE: CPT

## 2023-09-06 PROCEDURE — 83735 ASSAY OF MAGNESIUM: CPT

## 2023-09-06 PROCEDURE — 94761 N-INVAS EAR/PLS OXIMETRY MLT: CPT

## 2023-09-06 PROCEDURE — 36556 INSERT NON-TUNNEL CV CATH: CPT

## 2023-09-06 PROCEDURE — 83605 ASSAY OF LACTIC ACID: CPT

## 2023-09-06 PROCEDURE — 82803 BLOOD GASES ANY COMBINATION: CPT

## 2023-09-06 PROCEDURE — 82330 ASSAY OF CALCIUM: CPT

## 2023-09-06 PROCEDURE — 6360000002 HC RX W HCPCS: Performed by: PHYSICIAN ASSISTANT

## 2023-09-06 RX ORDER — CALCIUM GLUCONATE 20 MG/ML
1000 INJECTION, SOLUTION INTRAVENOUS ONCE
Status: COMPLETED | OUTPATIENT
Start: 2023-09-06 | End: 2023-09-06

## 2023-09-06 RX ORDER — DEXTROSE MONOHYDRATE 100 MG/ML
INJECTION, SOLUTION INTRAVENOUS CONTINUOUS PRN
Status: DISCONTINUED | OUTPATIENT
Start: 2023-09-06 | End: 2023-09-18 | Stop reason: HOSPADM

## 2023-09-06 RX ORDER — INSULIN LISPRO 100 [IU]/ML
0-16 INJECTION, SOLUTION INTRAVENOUS; SUBCUTANEOUS
Status: DISCONTINUED | OUTPATIENT
Start: 2023-09-06 | End: 2023-09-18 | Stop reason: HOSPADM

## 2023-09-06 RX ORDER — HEPARIN SODIUM 1000 [USP'U]/ML
INJECTION, SOLUTION INTRAVENOUS; SUBCUTANEOUS PRN
Status: COMPLETED | OUTPATIENT
Start: 2023-09-06 | End: 2023-09-06

## 2023-09-06 RX ORDER — CALCIUM GLUCONATE 20 MG/ML
1000 INJECTION, SOLUTION INTRAVENOUS ONCE
Status: COMPLETED | OUTPATIENT
Start: 2023-09-06 | End: 2023-09-07

## 2023-09-06 RX ORDER — INSULIN LISPRO 100 [IU]/ML
0-4 INJECTION, SOLUTION INTRAVENOUS; SUBCUTANEOUS NIGHTLY
Status: DISCONTINUED | OUTPATIENT
Start: 2023-09-06 | End: 2023-09-18 | Stop reason: HOSPADM

## 2023-09-06 RX ORDER — HEPARIN SODIUM 5000 [USP'U]/ML
5000 INJECTION, SOLUTION INTRAVENOUS; SUBCUTANEOUS EVERY 8 HOURS SCHEDULED
Status: DISCONTINUED | OUTPATIENT
Start: 2023-09-06 | End: 2023-09-18 | Stop reason: HOSPADM

## 2023-09-06 RX ORDER — CLOPIDOGREL BISULFATE 75 MG/1
75 TABLET ORAL DAILY
Status: DISCONTINUED | OUTPATIENT
Start: 2023-09-06 | End: 2023-09-18 | Stop reason: HOSPADM

## 2023-09-06 RX ORDER — HEPARIN SODIUM 1000 [USP'U]/ML
1400 INJECTION, SOLUTION INTRAVENOUS; SUBCUTANEOUS ONCE
Status: COMPLETED | OUTPATIENT
Start: 2023-09-06 | End: 2023-09-06

## 2023-09-06 RX ORDER — HEPARIN SODIUM 1000 [USP'U]/ML
1500 INJECTION, SOLUTION INTRAVENOUS; SUBCUTANEOUS ONCE
Status: COMPLETED | OUTPATIENT
Start: 2023-09-06 | End: 2023-09-06

## 2023-09-06 RX ORDER — MAGNESIUM SULFATE IN WATER 40 MG/ML
2000 INJECTION, SOLUTION INTRAVENOUS ONCE
Status: COMPLETED | OUTPATIENT
Start: 2023-09-06 | End: 2023-09-07

## 2023-09-06 RX ADMIN — HEPARIN SODIUM 5000 UNITS: 5000 INJECTION INTRAVENOUS; SUBCUTANEOUS at 15:27

## 2023-09-06 RX ADMIN — POTASSIUM BICARBONATE 40 MEQ: 782 TABLET, EFFERVESCENT ORAL at 23:14

## 2023-09-06 RX ADMIN — SODIUM CHLORIDE: 9 INJECTION, SOLUTION INTRAVENOUS at 18:44

## 2023-09-06 RX ADMIN — HEPARIN SODIUM 1400 UNITS: 1000 INJECTION, SOLUTION INTRAVENOUS; SUBCUTANEOUS at 10:21

## 2023-09-06 RX ADMIN — LEVETIRACETAM 500 MG: 500 TABLET, FILM COATED ORAL at 20:37

## 2023-09-06 RX ADMIN — ASPIRIN 81 MG 81 MG: 81 TABLET ORAL at 08:56

## 2023-09-06 RX ADMIN — SODIUM BICARBONATE: 84 INJECTION, SOLUTION INTRAVENOUS at 01:29

## 2023-09-06 RX ADMIN — HEPARIN SODIUM 1400 UNITS: 1000 INJECTION INTRAVENOUS; SUBCUTANEOUS at 18:12

## 2023-09-06 RX ADMIN — HEPARIN SODIUM 5000 UNITS: 5000 INJECTION INTRAVENOUS; SUBCUTANEOUS at 22:36

## 2023-09-06 RX ADMIN — PIPERACILLIN AND TAZOBACTAM 2250 MG: 2; .25 INJECTION, POWDER, LYOPHILIZED, FOR SOLUTION INTRAVENOUS at 11:23

## 2023-09-06 RX ADMIN — SODIUM CHLORIDE, PRESERVATIVE FREE 10 ML: 5 INJECTION INTRAVENOUS at 09:04

## 2023-09-06 RX ADMIN — TIOTROPIUM BROMIDE AND OLODATEROL 2 PUFF: 3.124; 2.736 SPRAY, METERED RESPIRATORY (INHALATION) at 07:29

## 2023-09-06 RX ADMIN — SODIUM CHLORIDE: 9 INJECTION, SOLUTION INTRAVENOUS at 01:39

## 2023-09-06 RX ADMIN — POTASSIUM CHLORIDE 10 MEQ: 10 INJECTION, SOLUTION INTRAVENOUS at 02:16

## 2023-09-06 RX ADMIN — HEPARIN SODIUM 1500 UNITS: 1000 INJECTION INTRAVENOUS; SUBCUTANEOUS at 18:13

## 2023-09-06 RX ADMIN — CLOPIDOGREL BISULFATE 75 MG: 75 TABLET, FILM COATED ORAL at 08:56

## 2023-09-06 RX ADMIN — SODIUM CHLORIDE, PRESERVATIVE FREE 10 ML: 5 INJECTION INTRAVENOUS at 20:37

## 2023-09-06 RX ADMIN — HEPARIN SODIUM 1500 UNITS: 1000 INJECTION, SOLUTION INTRAVENOUS; SUBCUTANEOUS at 10:21

## 2023-09-06 RX ADMIN — LEVETIRACETAM 500 MG: 500 TABLET, FILM COATED ORAL at 08:56

## 2023-09-06 RX ADMIN — PIPERACILLIN AND TAZOBACTAM 2250 MG: 2; .25 INJECTION, POWDER, LYOPHILIZED, FOR SOLUTION INTRAVENOUS at 18:46

## 2023-09-06 RX ADMIN — ONDANSETRON 4 MG: 2 INJECTION INTRAMUSCULAR; INTRAVENOUS at 04:41

## 2023-09-06 RX ADMIN — CALCIUM GLUCONATE 1000 MG: 20 INJECTION, SOLUTION INTRAVENOUS at 23:17

## 2023-09-06 RX ADMIN — PIPERACILLIN AND TAZOBACTAM 2250 MG: 2; .25 INJECTION, POWDER, LYOPHILIZED, FOR SOLUTION INTRAVENOUS at 02:49

## 2023-09-06 RX ADMIN — LEVETIRACETAM 500 MG: 500 TABLET, FILM COATED ORAL at 02:18

## 2023-09-06 RX ADMIN — CALCIUM GLUCONATE 1000 MG: 20 INJECTION, SOLUTION INTRAVENOUS at 01:39

## 2023-09-06 RX ADMIN — PANTOPRAZOLE SODIUM 40 MG: 40 TABLET, DELAYED RELEASE ORAL at 08:56

## 2023-09-06 RX ADMIN — SODIUM BICARBONATE: 84 INJECTION, SOLUTION INTRAVENOUS at 10:43

## 2023-09-06 ASSESSMENT — ENCOUNTER SYMPTOMS
DIARRHEA: 0
VOMITING: 0
ANAL BLEEDING: 0
COUGH: 0
SHORTNESS OF BREATH: 0
NAUSEA: 1
VOMITING: 1
NAUSEA: 0
ABDOMINAL DISTENTION: 0
ABDOMINAL PAIN: 0
BLOOD IN STOOL: 0
DIARRHEA: 1

## 2023-09-06 ASSESSMENT — PATIENT HEALTH QUESTIONNAIRE - PHQ9: SUM OF ALL RESPONSES TO PHQ QUESTIONS 1-9: 1

## 2023-09-06 NOTE — BRIEF OP NOTE
Brief Postoperative Note Non Tunneled HD Catheter    Shane Figueroa  YOB: 1965  [id number]    Pre-operative Diagnosis: Acute Renal Failure      Post-operative Diagnosis: Same    Procedure: Non tunneled Dialysis Catheter    Anesthesia: 1%Lidocaine     Surgeons/Assistants: Ronda Matt PA-C    Estimated Blood Loss: Minimal    Complications: none    13 Fr x 20 cm non tunneled HD Catheter placed via Site:  Right Internal Jugular Vein. Sutured in place and sterile dressing applied. Locked with Heparin  May use HD cath for dialysis.     Electronically signed by CINDY Landaverde on 9/6/2023 at 10:23 AM

## 2023-09-06 NOTE — CARE COORDINATION
Case Management Assessment  Initial Evaluation    Date/Time of Evaluation: 9/6/2023 5:35 PM  Assessment Completed by: Aric Dubois RN    If patient is discharged prior to next notation, then this note serves as note for discharge by case management. Patient Name: Josefina Ruelas                   YOB: 1965  Diagnosis: Septicemia [facility]) [A41.9]  Carotid stenosis, bilateral [I65.23]  ALMITA (acute kidney injury) ([address]) [N17.9]  Hypotension, unspecified hypotension type [I95.9]                   Date / Time: 9/5/2023  2:42 PM    Patient Admission Status: Inpatient   Readmission Risk (Low < 19, Mod (19-27), High > 27): Readmission Risk Score: 22.8    Current PCP: April Davis MD  PCP verified by CM? Yes April Davis MD)    Chart Reviewed: Yes      History Provided by: Patient  Patient Orientation: Alert and Oriented    Patient Cognition: Alert    Hospitalization in the last 30 days (Readmission):  No    If yes, Readmission Assessment in CM Navigator will be completed. Advance Directives:      Code Status: Full Code   Patient's Primary Decision Maker is:      Primary Decision Maker: Kalyan Benedict  Child - [phone]    Discharge Planning:    Patient lives with:   Type of Home: Apartment  Primary Care Giver: Self  Patient Support Systems include: Children, Family Members   Current Financial resources: [employer] ([address])  Current community resources: ECF/Home Care  Current services prior to admission: Durable Medical Equipment            Current DME: Glucometer, Walker, Cane (rollator walker)            Type of Home Care services:  Nursing Services, PT, OT    ADLS  Prior functional level: Independent in ADLs/IADLs  Current functional level: Mobility, Assistance with the following:    PT AM-PAC:   /24  OT AM-PAC:   /24    Family can provide assistance at DC:     Would you like Case Management to discuss the discharge plan with any other family members/significant others, and if

## 2023-09-06 NOTE — PROCEDURES
securement adhesive (SecureportIV) utilized at insertion site  Dressing applied: Tegaderm CHG  Lidocaine administered intradermally conc.1%: Approx 2 mL      RN aware CXR needed prior to using picc. CXR ordered and awaiting report. Rn aware new iv tubing required. PICC education:     [x] Discussed with patient/Family or POA prior to procedure. Risks and Benefits along with reason for procedure were discussed and teaching was reinforced with an education handout on line  insertion. [facility] FAQ Catheter Associated Blood Stream Infections and [address] [place] [address] REV. 7/13 Nursing and Booklet left at bedside or in chart. Patient (Family or POA) acknowledged understanding of information taught and agreed to procedure. [  ] Was not discussed with patient/family or POA due to pts medical status at time of procedure. pts family or POA not available to discuss line education. [facility] FAQ Catheter Associated Blood Stream Infections and [address] [place] [address] REV. 7/13 Nursing and Booklet left at bedside or in chart.

## 2023-09-06 NOTE — ED NOTES
The following labs were labeled with appropriate pt sticker and tubed to lab:     [] Blue     [] Lavender   [] on ice  [] Green/yellow  [] Green/black [] on ice  [] Marlin Leys  [] on ice  [] Yellow  [] Red  [] Type/ Screen  [] ABG  [] VBG    [] COVID-19 swab    [] Rapid  [] PCR  [] Flu swab  [] Peds Viral Panel     [x] Urine Sample  [] Fecal Sample  [] Pelvic Cultures  [] Blood Cultures  [] X 2  [] STREP Cultures      Brittany Manrique LPN  22/42/12 4341

## 2023-09-06 NOTE — CARE COORDINATION
Met with pt to complete an SBIRT. Pt is alert and oriented. He stated that he had a fall in the bathroom. He states that he is feeling very weak and just collapsed. He states that he has family in the area that are supportive. He states that he does not drink or use drugs. He states that he has been feeling a little down due to his health. He denies SI/HI. Pt states that he feels like he will need rehab due to how weak he is feeling. Pt resides at [facility] and states that everything is fine with his apt and he is thankful that he lives there. SBIRT is negative. Alcohol Screening and Brief Intervention        No results for input(s): ALC in the last 72 hours. Alcohol Pre-screening  (MEN ONLY) How many times in the past year have you had 5 or more drinks in a day?: None          Drug Pre-Screening   How many times in the past year have you used a recreational drug or used a prescription medication for nonmedical reasons?: None    Drug Screening DAST       Mood Pre-Screening (PHQ-2)  During the past two weeks, have you been bothered by little interest or pleasure in doing things?: Yes  During the past two weeks, have you been bothered by feeling down, depressed, or hopeless?: Yes    Mood Pre-Screening (PHQ-9)  Total Score for PHQ-9: 1      I have interviewed Alie Sims, [id number] regarding  His alcohol consumption/drug use and risk for excessive use. Screenings were negative. Patient  N/A intervention at this time.    Deferred []    Completed on: 9/6/2023   AV Shelton

## 2023-09-06 NOTE — H&P
abnormality. SOFT TISSUES/SKULL:  No acute abnormality of the visualized skull or soft tissues. 1. No acute intracranial findings. 2. Stable encephalomalacia in the frontal lobes which is more prominent on the left than the right and could be related to prior infarcts or trauma. ASSESSMENT:     Active Problems:    * No active hospital problems. *  Resolved Problems:    * No resolved hospital problems. *      PLAN:     #Septic shock, likely hypovolemic complicated by diuretic and ACE inhibitor use  #Severe ALMITA likely secondary to above  #Leukocytosis  #Uremia  #High anion gap metabolic acidosis secondary to uremia  #Elevated troponin, likely type II  #Hypokalemia  #Elevated CK  -Nephrology consulted  -Place Tate, urine studies pending, renal ultrasound. Fena . 5%  -Fluid resuscitation with bicarb drip. Stat ABG. -Repeat BMP every 6 hours  -Continue Levophed. Check serum cortisol level and TSH  -Cardiology consulted. Trend troponins  -Patient received Zosyn and vancomycin in the ED. He does meet sepsis criteria however denies any systematic symptoms of infection. Does have leukocytosis. We will continue Zosyn for now  -CK downtrending. Hold atorvastatin. Not true rhabdomyolysis      #New onset normocytic anemia hemoglobin of 8 with baseline 13-15  -Patient denies any episodes of bleeding or noticing any blood. He does have some RBCs seen in urine  #PVD with subclavian stent placed in 1/2023  -We will order initial anemia work-up.   Consider heme-onc consult in a.m.  -Transfuse if less than 7  -We will give GI prophylaxis and monitor for any signs of bleeding  -H&H every 12  -Hold DVT prophylaxis at this time but will continue asa and plavix      #Left sided extremity weakness starting 1 month prior  -Neurology consulted from the ED  -CT does not show acute stroke however there is bilateral encephalomalacia which could be related to prior strokes bilaterally      #Right upper lobe adenocarcinoma T1

## 2023-09-07 PROBLEM — I21.4 NSTEMI (NON-ST ELEVATED MYOCARDIAL INFARCTION) (HCC): Status: ACTIVE | Noted: 2023-09-07

## 2023-09-07 PROBLEM — I95.9 HYPOTENSION: Status: ACTIVE | Noted: 2023-09-07

## 2023-09-07 LAB
ANION GAP SERPL CALCULATED.3IONS-SCNC: 12 MMOL/L (ref 9–17)
ANION GAP SERPL CALCULATED.3IONS-SCNC: 14 MMOL/L (ref 9–17)
ANION GAP SERPL CALCULATED.3IONS-SCNC: 9 MMOL/L (ref 9–17)
BASOPHILS # BLD: <0.03 K/UL (ref 0–0.2)
BASOPHILS NFR BLD: 0 % (ref 0–2)
BUN SERPL-MCNC: 20 MG/DL (ref 6–20)
BUN SERPL-MCNC: 51 MG/DL (ref 6–20)
BUN SERPL-MCNC: 55 MG/DL (ref 6–20)
C3 SERPL-MCNC: 96 MG/DL (ref 90–180)
C4 SERPL-MCNC: 23 MG/DL (ref 10–40)
CALCIUM SERPL-MCNC: 7.2 MG/DL (ref 8.6–10.4)
CALCIUM SERPL-MCNC: 8 MG/DL (ref 8.6–10.4)
CALCIUM SERPL-MCNC: 8.2 MG/DL (ref 8.6–10.4)
CHLORIDE SERPL-SCNC: 101 MMOL/L (ref 98–107)
CHLORIDE SERPL-SCNC: 102 MMOL/L (ref 98–107)
CHLORIDE SERPL-SCNC: 97 MMOL/L (ref 98–107)
CK SERPL-CCNC: 573 U/L (ref 39–308)
CO2 SERPL-SCNC: 24 MMOL/L (ref 20–31)
CO2 SERPL-SCNC: 24 MMOL/L (ref 20–31)
CO2 SERPL-SCNC: 25 MMOL/L (ref 20–31)
CREAT SERPL-MCNC: 2 MG/DL (ref 0.7–1.2)
CREAT SERPL-MCNC: 4.3 MG/DL (ref 0.7–1.2)
CREAT SERPL-MCNC: 4.3 MG/DL (ref 0.7–1.2)
EOSINOPHIL # BLD: 0.19 K/UL (ref 0–0.44)
EOSINOPHILS RELATIVE PERCENT: 1 % (ref 1–4)
ERYTHROCYTE [DISTWIDTH] IN BLOOD BY AUTOMATED COUNT: 14.3 % (ref 11.8–14.4)
GFR SERPL CREATININE-BSD FRML MDRD: 15 ML/MIN/1.73M2
GFR SERPL CREATININE-BSD FRML MDRD: 15 ML/MIN/1.73M2
GFR SERPL CREATININE-BSD FRML MDRD: 38 ML/MIN/1.73M2
GLUCOSE BLD-MCNC: 107 MG/DL (ref 75–110)
GLUCOSE BLD-MCNC: 111 MG/DL (ref 75–110)
GLUCOSE BLD-MCNC: 122 MG/DL (ref 75–110)
GLUCOSE BLD-MCNC: 137 MG/DL (ref 75–110)
GLUCOSE BLD-MCNC: 209 MG/DL (ref 75–110)
GLUCOSE SERPL-MCNC: 125 MG/DL (ref 70–99)
GLUCOSE SERPL-MCNC: 134 MG/DL (ref 70–99)
GLUCOSE SERPL-MCNC: 158 MG/DL (ref 70–99)
HBV SURFACE AB SERPL IA-ACNC: >1000 MIU/ML
HBV SURFACE AG SERPL QL IA: NONREACTIVE
HCT VFR BLD AUTO: 19.7 % (ref 40.7–50.3)
HCT VFR BLD AUTO: 21.2 % (ref 40.7–50.3)
HCT VFR BLD AUTO: 24.6 % (ref 40.7–50.3)
HCT VFR BLD AUTO: 26.7 % (ref 40.7–50.3)
HCV AB SERPL QL IA: NONREACTIVE
HGB BLD-MCNC: 6.7 G/DL (ref 13–17)
HGB BLD-MCNC: 7 G/DL (ref 13–17)
HGB BLD-MCNC: 8.6 G/DL (ref 13–17)
HGB BLD-MCNC: 8.6 G/DL (ref 13–17)
IMM GRANULOCYTES # BLD AUTO: 0.12 K/UL (ref 0–0.3)
IMM GRANULOCYTES NFR BLD: 1 %
LYMPHOCYTES NFR BLD: 1.32 K/UL (ref 1.1–3.7)
LYMPHOCYTES RELATIVE PERCENT: 7 % (ref 24–43)
MAGNESIUM SERPL-MCNC: 1.7 MG/DL (ref 1.6–2.6)
MAGNESIUM SERPL-MCNC: 2.4 MG/DL (ref 1.6–2.6)
MCH RBC QN AUTO: 31.1 PG (ref 25.2–33.5)
MCHC RBC AUTO-ENTMCNC: 33 G/DL (ref 28.4–34.8)
MCV RBC AUTO: 94.2 FL (ref 82.6–102.9)
MONOCYTES NFR BLD: 1.29 K/UL (ref 0.1–1.2)
MONOCYTES NFR BLD: 7 % (ref 3–12)
MYOGLOBIN SERPL-MCNC: 450 NG/ML (ref 28–72)
NEUTROPHILS NFR BLD: 84 % (ref 36–65)
NEUTS SEG NFR BLD: 15.01 K/UL (ref 1.5–8.1)
NRBC BLD-RTO: 0 PER 100 WBC
PHOSPHATE SERPL-MCNC: 2.7 MG/DL (ref 2.5–4.5)
PLATELET # BLD AUTO: 352 K/UL (ref 138–453)
PMV BLD AUTO: 10.4 FL (ref 8.1–13.5)
POTASSIUM SERPL-SCNC: 2.9 MMOL/L (ref 3.7–5.3)
POTASSIUM SERPL-SCNC: 3.9 MMOL/L (ref 3.7–5.3)
POTASSIUM SERPL-SCNC: 4.1 MMOL/L (ref 3.7–5.3)
RBC # BLD AUTO: 2.25 M/UL (ref 4.21–5.77)
SODIUM SERPL-SCNC: 135 MMOL/L (ref 135–144)
SODIUM SERPL-SCNC: 136 MMOL/L (ref 135–144)
SODIUM SERPL-SCNC: 137 MMOL/L (ref 135–144)
WBC OTHER # BLD: 18 K/UL (ref 3.5–11.3)

## 2023-09-07 PROCEDURE — 86920 COMPATIBILITY TEST SPIN: CPT

## 2023-09-07 PROCEDURE — 90935 HEMODIALYSIS ONE EVALUATION: CPT

## 2023-09-07 PROCEDURE — 2580000003 HC RX 258: Performed by: STUDENT IN AN ORGANIZED HEALTH CARE EDUCATION/TRAINING PROGRAM

## 2023-09-07 PROCEDURE — 6360000002 HC RX W HCPCS: Performed by: INTERNAL MEDICINE

## 2023-09-07 PROCEDURE — 83516 IMMUNOASSAY NONANTIBODY: CPT

## 2023-09-07 PROCEDURE — 86038 ANTINUCLEAR ANTIBODIES: CPT

## 2023-09-07 PROCEDURE — 86850 RBC ANTIBODY SCREEN: CPT

## 2023-09-07 PROCEDURE — 86901 BLOOD TYPING SEROLOGIC RH(D): CPT

## 2023-09-07 PROCEDURE — 80048 BASIC METABOLIC PNL TOTAL CA: CPT

## 2023-09-07 PROCEDURE — 94761 N-INVAS EAR/PLS OXIMETRY MLT: CPT

## 2023-09-07 PROCEDURE — 99291 CRITICAL CARE FIRST HOUR: CPT | Performed by: INTERNAL MEDICINE

## 2023-09-07 PROCEDURE — 30233N1 TRANSFUSION OF NONAUTOLOGOUS RED BLOOD CELLS INTO PERIPHERAL VEIN, PERCUTANEOUS APPROACH: ICD-10-PCS | Performed by: INTERNAL MEDICINE

## 2023-09-07 PROCEDURE — 86704 HEP B CORE ANTIBODY TOTAL: CPT

## 2023-09-07 PROCEDURE — 85014 HEMATOCRIT: CPT

## 2023-09-07 PROCEDURE — 86900 BLOOD TYPING SEROLOGIC ABO: CPT

## 2023-09-07 PROCEDURE — P9016 RBC LEUKOCYTES REDUCED: HCPCS

## 2023-09-07 PROCEDURE — 86334 IMMUNOFIX E-PHORESIS SERUM: CPT

## 2023-09-07 PROCEDURE — 99232 SBSQ HOSP IP/OBS MODERATE 35: CPT | Performed by: INTERNAL MEDICINE

## 2023-09-07 PROCEDURE — 85025 COMPLETE CBC W/AUTO DIFF WBC: CPT

## 2023-09-07 PROCEDURE — 86225 DNA ANTIBODY NATIVE: CPT

## 2023-09-07 PROCEDURE — 6360000002 HC RX W HCPCS

## 2023-09-07 PROCEDURE — 82947 ASSAY GLUCOSE BLOOD QUANT: CPT

## 2023-09-07 PROCEDURE — 2580000003 HC RX 258

## 2023-09-07 PROCEDURE — 86160 COMPLEMENT ANTIGEN: CPT

## 2023-09-07 PROCEDURE — 83735 ASSAY OF MAGNESIUM: CPT

## 2023-09-07 PROCEDURE — 87340 HEPATITIS B SURFACE AG IA: CPT

## 2023-09-07 PROCEDURE — 6370000000 HC RX 637 (ALT 250 FOR IP)

## 2023-09-07 PROCEDURE — 84100 ASSAY OF PHOSPHORUS: CPT

## 2023-09-07 PROCEDURE — 86803 HEPATITIS C AB TEST: CPT

## 2023-09-07 PROCEDURE — 6360000002 HC RX W HCPCS: Performed by: STUDENT IN AN ORGANIZED HEALTH CARE EDUCATION/TRAINING PROGRAM

## 2023-09-07 PROCEDURE — 94640 AIRWAY INHALATION TREATMENT: CPT

## 2023-09-07 PROCEDURE — 36430 TRANSFUSION BLD/BLD COMPNT: CPT

## 2023-09-07 PROCEDURE — 1200000000 HC SEMI PRIVATE

## 2023-09-07 PROCEDURE — 83874 ASSAY OF MYOGLOBIN: CPT

## 2023-09-07 PROCEDURE — 99232 SBSQ HOSP IP/OBS MODERATE 35: CPT | Performed by: PSYCHIATRY & NEUROLOGY

## 2023-09-07 PROCEDURE — 85018 HEMOGLOBIN: CPT

## 2023-09-07 PROCEDURE — 6370000000 HC RX 637 (ALT 250 FOR IP): Performed by: STUDENT IN AN ORGANIZED HEALTH CARE EDUCATION/TRAINING PROGRAM

## 2023-09-07 PROCEDURE — 36415 COLL VENOUS BLD VENIPUNCTURE: CPT

## 2023-09-07 PROCEDURE — 99254 IP/OBS CNSLTJ NEW/EST MOD 60: CPT | Performed by: INTERNAL MEDICINE

## 2023-09-07 PROCEDURE — 82550 ASSAY OF CK (CPK): CPT

## 2023-09-07 PROCEDURE — 86317 IMMUNOASSAY INFECTIOUS AGENT: CPT

## 2023-09-07 RX ORDER — SODIUM CHLORIDE 9 MG/ML
INJECTION, SOLUTION INTRAVENOUS PRN
Status: DISCONTINUED | OUTPATIENT
Start: 2023-09-07 | End: 2023-09-08

## 2023-09-07 RX ORDER — HEPARIN SODIUM 1000 [USP'U]/ML
1500 INJECTION, SOLUTION INTRAVENOUS; SUBCUTANEOUS PRN
Status: DISCONTINUED | OUTPATIENT
Start: 2023-09-07 | End: 2023-09-18 | Stop reason: HOSPADM

## 2023-09-07 RX ORDER — HEPARIN SODIUM 1000 [USP'U]/ML
1400 INJECTION, SOLUTION INTRAVENOUS; SUBCUTANEOUS PRN
Status: DISCONTINUED | OUTPATIENT
Start: 2023-09-07 | End: 2023-09-18 | Stop reason: HOSPADM

## 2023-09-07 RX ORDER — POTASSIUM CHLORIDE 29.8 MG/ML
20 INJECTION INTRAVENOUS
Status: COMPLETED | OUTPATIENT
Start: 2023-09-07 | End: 2023-09-07

## 2023-09-07 RX ORDER — SODIUM CHLORIDE 9 MG/ML
INJECTION, SOLUTION INTRAVENOUS CONTINUOUS
Status: DISCONTINUED | OUTPATIENT
Start: 2023-09-07 | End: 2023-09-08

## 2023-09-07 RX ADMIN — CLOPIDOGREL BISULFATE 75 MG: 75 TABLET, FILM COATED ORAL at 07:54

## 2023-09-07 RX ADMIN — POTASSIUM CHLORIDE 20 MEQ: 29.8 INJECTION, SOLUTION INTRAVENOUS at 02:58

## 2023-09-07 RX ADMIN — TIOTROPIUM BROMIDE AND OLODATEROL 2 PUFF: 3.124; 2.736 SPRAY, METERED RESPIRATORY (INHALATION) at 08:22

## 2023-09-07 RX ADMIN — MAGNESIUM SULFATE HEPTAHYDRATE 2000 MG: 40 INJECTION, SOLUTION INTRAVENOUS at 00:27

## 2023-09-07 RX ADMIN — ASPIRIN 81 MG 81 MG: 81 TABLET ORAL at 07:54

## 2023-09-07 RX ADMIN — SODIUM CHLORIDE: 9 INJECTION, SOLUTION INTRAVENOUS at 14:20

## 2023-09-07 RX ADMIN — HEPARIN SODIUM 1400 UNITS: 1000 INJECTION INTRAVENOUS; SUBCUTANEOUS at 12:34

## 2023-09-07 RX ADMIN — HEPARIN SODIUM 5000 UNITS: 5000 INJECTION INTRAVENOUS; SUBCUTANEOUS at 06:19

## 2023-09-07 RX ADMIN — INSULIN LISPRO 4 UNITS: 100 INJECTION, SOLUTION INTRAVENOUS; SUBCUTANEOUS at 11:23

## 2023-09-07 RX ADMIN — POTASSIUM CHLORIDE 20 MEQ: 29.8 INJECTION, SOLUTION INTRAVENOUS at 02:11

## 2023-09-07 RX ADMIN — HEPARIN SODIUM 5000 UNITS: 5000 INJECTION INTRAVENOUS; SUBCUTANEOUS at 14:50

## 2023-09-07 RX ADMIN — SODIUM CHLORIDE, PRESERVATIVE FREE 10 ML: 5 INJECTION INTRAVENOUS at 19:49

## 2023-09-07 RX ADMIN — SODIUM CHLORIDE, PRESERVATIVE FREE 10 ML: 5 INJECTION INTRAVENOUS at 07:55

## 2023-09-07 RX ADMIN — HEPARIN SODIUM 1500 UNITS: 1000 INJECTION INTRAVENOUS; SUBCUTANEOUS at 12:35

## 2023-09-07 RX ADMIN — HEPARIN SODIUM 5000 UNITS: 5000 INJECTION INTRAVENOUS; SUBCUTANEOUS at 22:01

## 2023-09-07 RX ADMIN — PANTOPRAZOLE SODIUM 40 MG: 40 TABLET, DELAYED RELEASE ORAL at 07:54

## 2023-09-07 RX ADMIN — LEVETIRACETAM 500 MG: 500 TABLET, FILM COATED ORAL at 07:54

## 2023-09-07 RX ADMIN — LEVETIRACETAM 500 MG: 500 TABLET, FILM COATED ORAL at 19:48

## 2023-09-07 RX ADMIN — PIPERACILLIN AND TAZOBACTAM 2250 MG: 2; .25 INJECTION, POWDER, LYOPHILIZED, FOR SOLUTION INTRAVENOUS at 02:30

## 2023-09-07 ASSESSMENT — ENCOUNTER SYMPTOMS
NAUSEA: 0
VOMITING: 0
DIARRHEA: 0

## 2023-09-07 ASSESSMENT — PAIN DESCRIPTION - LOCATION: LOCATION: KNEE

## 2023-09-07 ASSESSMENT — PAIN DESCRIPTION - PAIN TYPE: TYPE: ACUTE PAIN

## 2023-09-07 ASSESSMENT — PAIN SCALES - GENERAL: PAINLEVEL_OUTOF10: 2

## 2023-09-07 NOTE — CONSENT
Informed Consent for Blood Component Transfusion Note    I have discussed with the patient the rationale for blood component transfusion; its benefits in treating or preventing fatigue, organ damage, or death; and its risk which includes mild transfusion reactions, rare risk of blood borne infection, or more serious but rare reactions. I have discussed the alternatives to transfusion, including the risk and consequences of not receiving transfusion. The patient had an opportunity to ask questions and had agreed to proceed with transfusion of blood components.     Electronically signed by Leilani Baker MD on 9/7/23 at 5:02 AM EDT

## 2023-09-07 NOTE — CARE COORDINATION
Late Entry    Hemodialysis Initial Assessment    Information provided by: Patient    New or Current with HD: New, ALMITA    Unit: [facility]; Open to other locations. Schedule:  No Preference    Physician: Wishes to follow [facility] Company: [employer]    Insurance: [facility]    DME: teresa Boudreaux    -Met with patient to discuss OP HD referral process. Patient preferences listed above. States he lives alone, uses cab for medical appointments. Referral submitted to [facility].

## 2023-09-07 NOTE — CARE COORDINATION
HD REFERRAL STATUS    Clinic: [facility]    Chair Time/Schedule: TBD    Start Date: TBD    Financial Clearance: Pending    Medical Clearance: Pending    -Awaiting Hep B labs to complete OP HD referral. All other needed records submitted to [facility]

## 2023-09-08 ENCOUNTER — APPOINTMENT (OUTPATIENT)
Age: 58
DRG: 720 | End: 2023-09-08
Payer: MEDICAID

## 2023-09-08 LAB
ABO/RH: NORMAL
ANA SER QL IA: NEGATIVE
ANCA MYELOPEROXIDASE: <0.3 AU/ML (ref 0–3.5)
ANCA PROTEINASE 3: <0.7 AU/ML (ref 0–2)
ANION GAP SERPL CALCULATED.3IONS-SCNC: 10 MMOL/L (ref 9–17)
ANION GAP SERPL CALCULATED.3IONS-SCNC: 12 MMOL/L (ref 9–17)
ANION GAP SERPL CALCULATED.3IONS-SCNC: 8 MMOL/L (ref 9–17)
ANTIBODY SCREEN: NEGATIVE
ARM BAND NUMBER: NORMAL
BASOPHILS # BLD: 0.06 K/UL (ref 0–0.2)
BASOPHILS NFR BLD: 0 % (ref 0–2)
BLOOD BANK BLOOD PRODUCT EXPIRATION DATE: NORMAL
BLOOD BANK DISPENSE STATUS: NORMAL
BLOOD BANK ISBT PRODUCT BLOOD TYPE: 6200
BLOOD BANK PRODUCT CODE: NORMAL
BLOOD BANK SAMPLE EXPIRATION: NORMAL
BLOOD BANK UNIT TYPE AND RH: NORMAL
BPU ID: NORMAL
BUN SERPL-MCNC: 22 MG/DL (ref 6–20)
BUN SERPL-MCNC: 23 MG/DL (ref 6–20)
BUN SERPL-MCNC: 29 MG/DL (ref 6–20)
CA-I BLD-SCNC: 1.06 MMOL/L (ref 1.13–1.33)
CALCIUM SERPL-MCNC: 7.4 MG/DL (ref 8.6–10.4)
CALCIUM SERPL-MCNC: 7.5 MG/DL (ref 8.6–10.4)
CALCIUM SERPL-MCNC: 8.1 MG/DL (ref 8.6–10.4)
CHLORIDE SERPL-SCNC: 100 MMOL/L (ref 98–107)
CHLORIDE SERPL-SCNC: 101 MMOL/L (ref 98–107)
CHLORIDE SERPL-SCNC: 103 MMOL/L (ref 98–107)
CO2 SERPL-SCNC: 27 MMOL/L (ref 20–31)
CO2 SERPL-SCNC: 27 MMOL/L (ref 20–31)
CO2 SERPL-SCNC: 28 MMOL/L (ref 20–31)
COMPONENT: NORMAL
CREAT SERPL-MCNC: 2.2 MG/DL (ref 0.7–1.2)
CREAT SERPL-MCNC: 2.4 MG/DL (ref 0.7–1.2)
CREAT SERPL-MCNC: 2.5 MG/DL (ref 0.7–1.2)
CROSSMATCH RESULT: NORMAL
DSDNA IGG SER QL IA: <0.5 IU/ML
EOSINOPHIL # BLD: 0.43 K/UL (ref 0–0.44)
EOSINOPHILS RELATIVE PERCENT: 3 % (ref 1–4)
ERYTHROCYTE [DISTWIDTH] IN BLOOD BY AUTOMATED COUNT: 14.9 % (ref 11.8–14.4)
GFR SERPL CREATININE-BSD FRML MDRD: 29 ML/MIN/1.73M2
GFR SERPL CREATININE-BSD FRML MDRD: 30 ML/MIN/1.73M2
GFR SERPL CREATININE-BSD FRML MDRD: 34 ML/MIN/1.73M2
GLUCOSE BLD-MCNC: 106 MG/DL (ref 75–110)
GLUCOSE BLD-MCNC: 138 MG/DL (ref 75–110)
GLUCOSE BLD-MCNC: 191 MG/DL (ref 75–110)
GLUCOSE BLD-MCNC: 198 MG/DL (ref 75–110)
GLUCOSE SERPL-MCNC: 100 MG/DL (ref 70–99)
GLUCOSE SERPL-MCNC: 121 MG/DL (ref 70–99)
GLUCOSE SERPL-MCNC: 97 MG/DL (ref 70–99)
HBV CORE AB SER QL: NONREACTIVE
HCT VFR BLD AUTO: 23.8 % (ref 40.7–50.3)
HCT VFR BLD AUTO: 24.3 % (ref 40.7–50.3)
HCT VFR BLD AUTO: 25.8 % (ref 40.7–50.3)
HGB BLD-MCNC: 7.8 G/DL (ref 13–17)
HGB BLD-MCNC: 7.8 G/DL (ref 13–17)
HGB BLD-MCNC: 8.5 G/DL (ref 13–17)
IMM GRANULOCYTES # BLD AUTO: 0.07 K/UL (ref 0–0.3)
IMM GRANULOCYTES NFR BLD: 1 %
LYMPHOCYTES NFR BLD: 1.88 K/UL (ref 1.1–3.7)
LYMPHOCYTES RELATIVE PERCENT: 14 % (ref 24–43)
MAGNESIUM SERPL-MCNC: 1.5 MG/DL (ref 1.6–2.6)
MCH RBC QN AUTO: 31.5 PG (ref 25.2–33.5)
MCHC RBC AUTO-ENTMCNC: 32.9 G/DL (ref 28.4–34.8)
MCV RBC AUTO: 95.6 FL (ref 82.6–102.9)
MONOCYTES NFR BLD: 1.4 K/UL (ref 0.1–1.2)
MONOCYTES NFR BLD: 10 % (ref 3–12)
NEUTROPHILS NFR BLD: 72 % (ref 36–65)
NEUTS SEG NFR BLD: 9.76 K/UL (ref 1.5–8.1)
NRBC BLD-RTO: 0 PER 100 WBC
NUCLEAR IGG SER IA-RTO: 0.1 U/ML
PHOSPHATE SERPL-MCNC: 1.6 MG/DL (ref 2.5–4.5)
PLATELET # BLD AUTO: 316 K/UL (ref 138–453)
PMV BLD AUTO: 9.7 FL (ref 8.1–13.5)
POTASSIUM SERPL-SCNC: 3.7 MMOL/L (ref 3.7–5.3)
POTASSIUM SERPL-SCNC: 3.7 MMOL/L (ref 3.7–5.3)
POTASSIUM SERPL-SCNC: 3.8 MMOL/L (ref 3.7–5.3)
RBC # BLD AUTO: 2.7 M/UL (ref 4.21–5.77)
RBC # BLD: ABNORMAL 10*6/UL
SODIUM SERPL-SCNC: 138 MMOL/L (ref 135–144)
SODIUM SERPL-SCNC: 139 MMOL/L (ref 135–144)
SODIUM SERPL-SCNC: 139 MMOL/L (ref 135–144)
TRANSFUSION STATUS: NORMAL
UNIT DIVISION: 0
UNIT ISSUE DATE/TIME: NORMAL
WBC OTHER # BLD: 13.6 K/UL (ref 3.5–11.3)

## 2023-09-08 PROCEDURE — 6360000002 HC RX W HCPCS

## 2023-09-08 PROCEDURE — 83735 ASSAY OF MAGNESIUM: CPT

## 2023-09-08 PROCEDURE — 6360000002 HC RX W HCPCS: Performed by: INTERNAL MEDICINE

## 2023-09-08 PROCEDURE — 99254 IP/OBS CNSLTJ NEW/EST MOD 60: CPT | Performed by: PHYSICAL MEDICINE & REHABILITATION

## 2023-09-08 PROCEDURE — 85025 COMPLETE CBC W/AUTO DIFF WBC: CPT

## 2023-09-08 PROCEDURE — 6370000000 HC RX 637 (ALT 250 FOR IP): Performed by: STUDENT IN AN ORGANIZED HEALTH CARE EDUCATION/TRAINING PROGRAM

## 2023-09-08 PROCEDURE — 2500000003 HC RX 250 WO HCPCS: Performed by: INTERNAL MEDICINE

## 2023-09-08 PROCEDURE — 84100 ASSAY OF PHOSPHORUS: CPT

## 2023-09-08 PROCEDURE — 2580000003 HC RX 258: Performed by: STUDENT IN AN ORGANIZED HEALTH CARE EDUCATION/TRAINING PROGRAM

## 2023-09-08 PROCEDURE — 85018 HEMOGLOBIN: CPT

## 2023-09-08 PROCEDURE — 80048 BASIC METABOLIC PNL TOTAL CA: CPT

## 2023-09-08 PROCEDURE — 97530 THERAPEUTIC ACTIVITIES: CPT

## 2023-09-08 PROCEDURE — 36415 COLL VENOUS BLD VENIPUNCTURE: CPT

## 2023-09-08 PROCEDURE — 90935 HEMODIALYSIS ONE EVALUATION: CPT

## 2023-09-08 PROCEDURE — 99233 SBSQ HOSP IP/OBS HIGH 50: CPT | Performed by: INTERNAL MEDICINE

## 2023-09-08 PROCEDURE — 6370000000 HC RX 637 (ALT 250 FOR IP)

## 2023-09-08 PROCEDURE — 94640 AIRWAY INHALATION TREATMENT: CPT

## 2023-09-08 PROCEDURE — 99232 SBSQ HOSP IP/OBS MODERATE 35: CPT | Performed by: INTERNAL MEDICINE

## 2023-09-08 PROCEDURE — 97166 OT EVAL MOD COMPLEX 45 MIN: CPT

## 2023-09-08 PROCEDURE — 82330 ASSAY OF CALCIUM: CPT

## 2023-09-08 PROCEDURE — 2580000003 HC RX 258: Performed by: INTERNAL MEDICINE

## 2023-09-08 PROCEDURE — 85014 HEMATOCRIT: CPT

## 2023-09-08 PROCEDURE — 99291 CRITICAL CARE FIRST HOUR: CPT | Performed by: INTERNAL MEDICINE

## 2023-09-08 PROCEDURE — 1200000000 HC SEMI PRIVATE

## 2023-09-08 PROCEDURE — 82947 ASSAY GLUCOSE BLOOD QUANT: CPT

## 2023-09-08 PROCEDURE — 97535 SELF CARE MNGMENT TRAINING: CPT

## 2023-09-08 RX ORDER — CALCIUM GLUCONATE 20 MG/ML
2000 INJECTION, SOLUTION INTRAVENOUS ONCE
Status: COMPLETED | OUTPATIENT
Start: 2023-09-08 | End: 2023-09-08

## 2023-09-08 RX ORDER — METOPROLOL TARTRATE 5 MG/5ML
5 INJECTION INTRAVENOUS EVERY 6 HOURS PRN
Status: DISCONTINUED | OUTPATIENT
Start: 2023-09-08 | End: 2023-09-11

## 2023-09-08 RX ORDER — MAGNESIUM SULFATE IN WATER 40 MG/ML
2000 INJECTION, SOLUTION INTRAVENOUS
Status: COMPLETED | OUTPATIENT
Start: 2023-09-08 | End: 2023-09-08

## 2023-09-08 RX ORDER — FOLIC ACID 1 MG/1
1 TABLET ORAL DAILY
Status: DISCONTINUED | OUTPATIENT
Start: 2023-09-08 | End: 2023-09-18 | Stop reason: HOSPADM

## 2023-09-08 RX ADMIN — HEPARIN SODIUM 5000 UNITS: 5000 INJECTION INTRAVENOUS; SUBCUTANEOUS at 22:12

## 2023-09-08 RX ADMIN — ACETAMINOPHEN 650 MG: 325 TABLET ORAL at 15:44

## 2023-09-08 RX ADMIN — HEPARIN SODIUM 5000 UNITS: 5000 INJECTION INTRAVENOUS; SUBCUTANEOUS at 14:53

## 2023-09-08 RX ADMIN — TIOTROPIUM BROMIDE AND OLODATEROL 2 PUFF: 3.124; 2.736 SPRAY, METERED RESPIRATORY (INHALATION) at 09:01

## 2023-09-08 RX ADMIN — LEVETIRACETAM 500 MG: 500 TABLET, FILM COATED ORAL at 20:20

## 2023-09-08 RX ADMIN — FOLIC ACID 1 MG: 1 TABLET ORAL at 13:31

## 2023-09-08 RX ADMIN — CALCIUM GLUCONATE 2000 MG: 20 INJECTION, SOLUTION INTRAVENOUS at 14:49

## 2023-09-08 RX ADMIN — LEVETIRACETAM 500 MG: 500 TABLET, FILM COATED ORAL at 08:07

## 2023-09-08 RX ADMIN — ASPIRIN 81 MG 81 MG: 81 TABLET ORAL at 08:08

## 2023-09-08 RX ADMIN — HEPARIN SODIUM 5000 UNITS: 5000 INJECTION INTRAVENOUS; SUBCUTANEOUS at 06:05

## 2023-09-08 RX ADMIN — MAGNESIUM SULFATE HEPTAHYDRATE 2000 MG: 40 INJECTION, SOLUTION INTRAVENOUS at 12:11

## 2023-09-08 RX ADMIN — SODIUM CHLORIDE, PRESERVATIVE FREE 10 ML: 5 INJECTION INTRAVENOUS at 20:20

## 2023-09-08 RX ADMIN — SODIUM PHOSPHATE, MONOBASIC, MONOHYDRATE AND SODIUM PHOSPHATE, DIBASIC, ANHYDROUS 20 MMOL: 276; 142 INJECTION, SOLUTION INTRAVENOUS at 11:42

## 2023-09-08 RX ADMIN — SODIUM CHLORIDE, PRESERVATIVE FREE 10 ML: 5 INJECTION INTRAVENOUS at 08:09

## 2023-09-08 RX ADMIN — MAGNESIUM SULFATE HEPTAHYDRATE 2000 MG: 40 INJECTION, SOLUTION INTRAVENOUS at 09:21

## 2023-09-08 RX ADMIN — CLOPIDOGREL BISULFATE 75 MG: 75 TABLET, FILM COATED ORAL at 08:08

## 2023-09-08 ASSESSMENT — PAIN DESCRIPTION - DESCRIPTORS: DESCRIPTORS: ACHING

## 2023-09-08 ASSESSMENT — PAIN DESCRIPTION - ORIENTATION: ORIENTATION: LEFT

## 2023-09-08 ASSESSMENT — PAIN SCALES - GENERAL: PAINLEVEL_OUTOF10: 3

## 2023-09-08 ASSESSMENT — PAIN DESCRIPTION - LOCATION: LOCATION: HAND;FINGER (COMMENT WHICH ONE)

## 2023-09-08 ASSESSMENT — PAIN - FUNCTIONAL ASSESSMENT: PAIN_FUNCTIONAL_ASSESSMENT: ACTIVITIES ARE NOT PREVENTED

## 2023-09-08 NOTE — CARE COORDINATION
Dr Boy Pizano pm&r rounding await PT note but anticipating patient will be appropriate for aru. Dr Boy Pizano [facility] patient as an op. Met with patient gave list. Referral made to [facility] aru. Post Acute Facility/Agency List     Provided patient with the following list, the list includes the overall star ratings obtained from CMS per the Medicare Web site (www.Medicare.gov):     [] [address]  [x] Acute Inpatient Rehabilitation Facilities  [] Skilled Nursing Facilities  [] Home Care    Provided verbal instructions on how to utilize the QR Code to obtain additional detailed star ratings from www. Medicare. gov     offered to print and provide the detailed list:    []Accepted   [x]Declined

## 2023-09-08 NOTE — CARE COORDINATION
Transitional planning. Pt called his [facility]. nurse and confirmed that [facility]. Cleopatra with [facility] confirmed that pt is current with their services. [phone], fax [phone]  Unable to sent [facility]. request through Epic call above number when pt is dc'd. Referral faxed to [facility], attempted call [phone] to cancel referral, unable to complete call. 1615 informed 3B CM that pt's choice for ARU is [facility], she will speak to pt concerning dc planning.

## 2023-09-08 NOTE — CARE COORDINATION
HD REFERRAL STATUS    Clinic: [facility]    Chair Time/Schedule: [facility] 5:15am    Start Date: TBD    Financial Clearance: Approved    Medical Clearance: Approved    -Patient accepted at [facility] per Tyra Nieto. Will review information with patient to confirm time is suitable for transportation. 1518  Reviewed information with patient. Patient considering ARU at [facility]. Will continue to follow. 1614  Admissions made error. [address] location does not have availability. Location with opening is [place].

## 2023-09-09 LAB
ANION GAP SERPL CALCULATED.3IONS-SCNC: 11 MMOL/L (ref 9–17)
BASOPHILS # BLD: 0.05 K/UL (ref 0–0.2)
BASOPHILS NFR BLD: 0 % (ref 0–2)
BUN SERPL-MCNC: 28 MG/DL (ref 6–20)
CALCIUM SERPL-MCNC: 7.9 MG/DL (ref 8.6–10.4)
CHLORIDE SERPL-SCNC: 98 MMOL/L (ref 98–107)
CO2 SERPL-SCNC: 28 MMOL/L (ref 20–31)
CREAT SERPL-MCNC: 2.7 MG/DL (ref 0.7–1.2)
EOSINOPHIL # BLD: 0.57 K/UL (ref 0–0.44)
EOSINOPHILS RELATIVE PERCENT: 4 % (ref 1–4)
ERYTHROCYTE [DISTWIDTH] IN BLOOD BY AUTOMATED COUNT: 14.5 % (ref 11.8–14.4)
GFR SERPL CREATININE-BSD FRML MDRD: 26 ML/MIN/1.73M2
GLUCOSE BLD-MCNC: 110 MG/DL (ref 75–110)
GLUCOSE BLD-MCNC: 152 MG/DL (ref 75–110)
GLUCOSE BLD-MCNC: 274 MG/DL (ref 75–110)
GLUCOSE BLD-MCNC: 79 MG/DL (ref 75–110)
GLUCOSE SERPL-MCNC: 197 MG/DL (ref 70–99)
HCT VFR BLD AUTO: 24.9 % (ref 40.7–50.3)
HCT VFR BLD AUTO: 26.3 % (ref 40.7–50.3)
HGB BLD-MCNC: 7.8 G/DL (ref 13–17)
HGB BLD-MCNC: 8.5 G/DL (ref 13–17)
IMM GRANULOCYTES # BLD AUTO: 0.1 K/UL (ref 0–0.3)
IMM GRANULOCYTES NFR BLD: 1 %
LYMPHOCYTES NFR BLD: 1.65 K/UL (ref 1.1–3.7)
LYMPHOCYTES RELATIVE PERCENT: 12 % (ref 24–43)
MAGNESIUM SERPL-MCNC: 2.2 MG/DL (ref 1.6–2.6)
MCH RBC QN AUTO: 31.5 PG (ref 25.2–33.5)
MCHC RBC AUTO-ENTMCNC: 32.3 G/DL (ref 28.4–34.8)
MCV RBC AUTO: 97.4 FL (ref 82.6–102.9)
MONOCYTES NFR BLD: 1.36 K/UL (ref 0.1–1.2)
MONOCYTES NFR BLD: 10 % (ref 3–12)
NEUTROPHILS NFR BLD: 73 % (ref 36–65)
NEUTS SEG NFR BLD: 10.44 K/UL (ref 1.5–8.1)
NRBC BLD-RTO: 0 PER 100 WBC
PHOSPHATE SERPL-MCNC: 2.4 MG/DL (ref 2.5–4.5)
PLATELET # BLD AUTO: 314 K/UL (ref 138–453)
PMV BLD AUTO: 10.1 FL (ref 8.1–13.5)
POTASSIUM SERPL-SCNC: 3.4 MMOL/L (ref 3.7–5.3)
RBC # BLD AUTO: 2.7 M/UL (ref 4.21–5.77)
RBC # BLD: ABNORMAL 10*6/UL
SODIUM SERPL-SCNC: 137 MMOL/L (ref 135–144)
WBC OTHER # BLD: 14.2 K/UL (ref 3.5–11.3)

## 2023-09-09 PROCEDURE — 6370000000 HC RX 637 (ALT 250 FOR IP): Performed by: STUDENT IN AN ORGANIZED HEALTH CARE EDUCATION/TRAINING PROGRAM

## 2023-09-09 PROCEDURE — 85014 HEMATOCRIT: CPT

## 2023-09-09 PROCEDURE — 99232 SBSQ HOSP IP/OBS MODERATE 35: CPT | Performed by: INTERNAL MEDICINE

## 2023-09-09 PROCEDURE — 97530 THERAPEUTIC ACTIVITIES: CPT

## 2023-09-09 PROCEDURE — 2580000003 HC RX 258: Performed by: STUDENT IN AN ORGANIZED HEALTH CARE EDUCATION/TRAINING PROGRAM

## 2023-09-09 PROCEDURE — 6370000000 HC RX 637 (ALT 250 FOR IP)

## 2023-09-09 PROCEDURE — 84100 ASSAY OF PHOSPHORUS: CPT

## 2023-09-09 PROCEDURE — 36415 COLL VENOUS BLD VENIPUNCTURE: CPT

## 2023-09-09 PROCEDURE — 85025 COMPLETE CBC W/AUTO DIFF WBC: CPT

## 2023-09-09 PROCEDURE — 6360000002 HC RX W HCPCS

## 2023-09-09 PROCEDURE — 97162 PT EVAL MOD COMPLEX 30 MIN: CPT

## 2023-09-09 PROCEDURE — 1200000000 HC SEMI PRIVATE

## 2023-09-09 PROCEDURE — 94760 N-INVAS EAR/PLS OXIMETRY 1: CPT

## 2023-09-09 PROCEDURE — 82947 ASSAY GLUCOSE BLOOD QUANT: CPT

## 2023-09-09 PROCEDURE — 94640 AIRWAY INHALATION TREATMENT: CPT

## 2023-09-09 PROCEDURE — 80048 BASIC METABOLIC PNL TOTAL CA: CPT

## 2023-09-09 PROCEDURE — 83735 ASSAY OF MAGNESIUM: CPT

## 2023-09-09 PROCEDURE — 6370000000 HC RX 637 (ALT 250 FOR IP): Performed by: NURSE PRACTITIONER

## 2023-09-09 PROCEDURE — 2500000003 HC RX 250 WO HCPCS

## 2023-09-09 PROCEDURE — 85018 HEMOGLOBIN: CPT

## 2023-09-09 PROCEDURE — 2580000003 HC RX 258

## 2023-09-09 RX ORDER — POTASSIUM CHLORIDE 20 MEQ/1
40 TABLET, EXTENDED RELEASE ORAL ONCE
Status: DISCONTINUED | OUTPATIENT
Start: 2023-09-09 | End: 2023-09-09 | Stop reason: SDUPTHER

## 2023-09-09 RX ORDER — CARVEDILOL 6.25 MG/1
6.25 TABLET ORAL 2 TIMES DAILY
Status: DISCONTINUED | OUTPATIENT
Start: 2023-09-09 | End: 2023-09-16

## 2023-09-09 RX ADMIN — SODIUM CHLORIDE, PRESERVATIVE FREE 10 ML: 5 INJECTION INTRAVENOUS at 20:41

## 2023-09-09 RX ADMIN — SODIUM PHOSPHATE, MONOBASIC, MONOHYDRATE AND SODIUM PHOSPHATE, DIBASIC, ANHYDROUS 10 MMOL: 276; 142 INJECTION, SOLUTION INTRAVENOUS at 22:01

## 2023-09-09 RX ADMIN — ASPIRIN 81 MG 81 MG: 81 TABLET ORAL at 09:27

## 2023-09-09 RX ADMIN — INSULIN LISPRO 8 UNITS: 100 INJECTION, SOLUTION INTRAVENOUS; SUBCUTANEOUS at 12:44

## 2023-09-09 RX ADMIN — CARVEDILOL 6.25 MG: 6.25 TABLET, FILM COATED ORAL at 09:43

## 2023-09-09 RX ADMIN — POTASSIUM BICARBONATE 40 MEQ: 782 TABLET, EFFERVESCENT ORAL at 12:44

## 2023-09-09 RX ADMIN — TIOTROPIUM BROMIDE AND OLODATEROL 2 PUFF: 3.124; 2.736 SPRAY, METERED RESPIRATORY (INHALATION) at 09:21

## 2023-09-09 RX ADMIN — CLOPIDOGREL BISULFATE 75 MG: 75 TABLET, FILM COATED ORAL at 09:27

## 2023-09-09 RX ADMIN — CARVEDILOL 6.25 MG: 6.25 TABLET, FILM COATED ORAL at 20:41

## 2023-09-09 RX ADMIN — HEPARIN SODIUM 5000 UNITS: 5000 INJECTION INTRAVENOUS; SUBCUTANEOUS at 16:25

## 2023-09-09 RX ADMIN — LEVETIRACETAM 500 MG: 500 TABLET, FILM COATED ORAL at 20:41

## 2023-09-09 RX ADMIN — FOLIC ACID 1 MG: 1 TABLET ORAL at 09:27

## 2023-09-09 RX ADMIN — LEVETIRACETAM 500 MG: 500 TABLET, FILM COATED ORAL at 09:27

## 2023-09-09 RX ADMIN — HEPARIN SODIUM 5000 UNITS: 5000 INJECTION INTRAVENOUS; SUBCUTANEOUS at 20:41

## 2023-09-09 RX ADMIN — SODIUM CHLORIDE, PRESERVATIVE FREE 40 ML: 5 INJECTION INTRAVENOUS at 09:28

## 2023-09-09 ASSESSMENT — ENCOUNTER SYMPTOMS
WHEEZING: 0
NAUSEA: 0
VOMITING: 0
SHORTNESS OF BREATH: 0
ABDOMINAL PAIN: 0
BLOOD IN STOOL: 0
COLOR CHANGE: 0
COUGH: 0

## 2023-09-09 ASSESSMENT — PAIN SCALES - GENERAL
PAINLEVEL_OUTOF10: 0
PAINLEVEL_OUTOF10: 0

## 2023-09-10 LAB
ANION GAP SERPL CALCULATED.3IONS-SCNC: 12 MMOL/L (ref 9–17)
BASOPHILS # BLD: 0.1 K/UL (ref 0–0.2)
BASOPHILS NFR BLD: 1 % (ref 0–2)
BUN SERPL-MCNC: 31 MG/DL (ref 6–20)
CALCIUM SERPL-MCNC: 8 MG/DL (ref 8.6–10.4)
CHLORIDE SERPL-SCNC: 104 MMOL/L (ref 98–107)
CO2 SERPL-SCNC: 25 MMOL/L (ref 20–31)
CREAT SERPL-MCNC: 2.8 MG/DL (ref 0.7–1.2)
EOSINOPHIL # BLD: 0.64 K/UL (ref 0–0.44)
EOSINOPHILS RELATIVE PERCENT: 5 % (ref 1–4)
ERYTHROCYTE [DISTWIDTH] IN BLOOD BY AUTOMATED COUNT: 14.6 % (ref 11.8–14.4)
GFR SERPL CREATININE-BSD FRML MDRD: 25 ML/MIN/1.73M2
GLUCOSE BLD-MCNC: 105 MG/DL (ref 75–110)
GLUCOSE BLD-MCNC: 239 MG/DL (ref 75–110)
GLUCOSE BLD-MCNC: 272 MG/DL (ref 75–110)
GLUCOSE BLD-MCNC: 99 MG/DL (ref 75–110)
GLUCOSE SERPL-MCNC: 107 MG/DL (ref 70–99)
HCT VFR BLD AUTO: 29.4 % (ref 40.7–50.3)
HGB BLD-MCNC: 8.9 G/DL (ref 13–17)
IMM GRANULOCYTES # BLD AUTO: 0.07 K/UL (ref 0–0.3)
IMM GRANULOCYTES NFR BLD: 1 %
INTERPRETATION SERPL IFE-IMP: NORMAL
LYMPHOCYTES NFR BLD: 1.83 K/UL (ref 1.1–3.7)
LYMPHOCYTES RELATIVE PERCENT: 14 % (ref 24–43)
MAGNESIUM SERPL-MCNC: 2.1 MG/DL (ref 1.6–2.6)
MCH RBC QN AUTO: 31.2 PG (ref 25.2–33.5)
MCHC RBC AUTO-ENTMCNC: 30.3 G/DL (ref 28.4–34.8)
MCV RBC AUTO: 103.2 FL (ref 82.6–102.9)
MICROORGANISM SPEC CULT: NORMAL
MONOCYTES NFR BLD: 1.38 K/UL (ref 0.1–1.2)
MONOCYTES NFR BLD: 11 % (ref 3–12)
NEUTROPHILS NFR BLD: 69 % (ref 36–65)
NEUTS SEG NFR BLD: 8.74 K/UL (ref 1.5–8.1)
NRBC BLD-RTO: 0 PER 100 WBC
PATH REV: NORMAL
PHOSPHATE SERPL-MCNC: 3.2 MG/DL (ref 2.5–4.5)
PLATELET # BLD AUTO: 325 K/UL (ref 138–453)
PMV BLD AUTO: 9.9 FL (ref 8.1–13.5)
POTASSIUM SERPL-SCNC: 3.5 MMOL/L (ref 3.7–5.3)
RBC # BLD AUTO: 2.85 M/UL (ref 4.21–5.77)
RBC # BLD: ABNORMAL 10*6/UL
RBC # BLD: ABNORMAL 10*6/UL
SERVICE CMNT-IMP: NORMAL
SODIUM SERPL-SCNC: 141 MMOL/L (ref 135–144)
SPECIMEN DESCRIPTION: NORMAL
WBC OTHER # BLD: 12.8 K/UL (ref 3.5–11.3)

## 2023-09-10 PROCEDURE — 6360000002 HC RX W HCPCS

## 2023-09-10 PROCEDURE — 6370000000 HC RX 637 (ALT 250 FOR IP)

## 2023-09-10 PROCEDURE — 80048 BASIC METABOLIC PNL TOTAL CA: CPT

## 2023-09-10 PROCEDURE — 99232 SBSQ HOSP IP/OBS MODERATE 35: CPT | Performed by: INTERNAL MEDICINE

## 2023-09-10 PROCEDURE — 97535 SELF CARE MNGMENT TRAINING: CPT

## 2023-09-10 PROCEDURE — 36415 COLL VENOUS BLD VENIPUNCTURE: CPT

## 2023-09-10 PROCEDURE — 6370000000 HC RX 637 (ALT 250 FOR IP): Performed by: STUDENT IN AN ORGANIZED HEALTH CARE EDUCATION/TRAINING PROGRAM

## 2023-09-10 PROCEDURE — 85025 COMPLETE CBC W/AUTO DIFF WBC: CPT

## 2023-09-10 PROCEDURE — 97530 THERAPEUTIC ACTIVITIES: CPT

## 2023-09-10 PROCEDURE — 94640 AIRWAY INHALATION TREATMENT: CPT

## 2023-09-10 PROCEDURE — 84100 ASSAY OF PHOSPHORUS: CPT

## 2023-09-10 PROCEDURE — 82947 ASSAY GLUCOSE BLOOD QUANT: CPT

## 2023-09-10 PROCEDURE — 1200000000 HC SEMI PRIVATE

## 2023-09-10 PROCEDURE — 2580000003 HC RX 258: Performed by: STUDENT IN AN ORGANIZED HEALTH CARE EDUCATION/TRAINING PROGRAM

## 2023-09-10 PROCEDURE — 83735 ASSAY OF MAGNESIUM: CPT

## 2023-09-10 PROCEDURE — 94760 N-INVAS EAR/PLS OXIMETRY 1: CPT

## 2023-09-10 RX ADMIN — ASPIRIN 81 MG 81 MG: 81 TABLET ORAL at 09:44

## 2023-09-10 RX ADMIN — POTASSIUM BICARBONATE 40 MEQ: 782 TABLET, EFFERVESCENT ORAL at 09:47

## 2023-09-10 RX ADMIN — HEPARIN SODIUM 5000 UNITS: 5000 INJECTION INTRAVENOUS; SUBCUTANEOUS at 14:19

## 2023-09-10 RX ADMIN — CLOPIDOGREL BISULFATE 75 MG: 75 TABLET, FILM COATED ORAL at 09:44

## 2023-09-10 RX ADMIN — HEPARIN SODIUM 5000 UNITS: 5000 INJECTION INTRAVENOUS; SUBCUTANEOUS at 05:55

## 2023-09-10 RX ADMIN — INSULIN LISPRO 4 UNITS: 100 INJECTION, SOLUTION INTRAVENOUS; SUBCUTANEOUS at 12:15

## 2023-09-10 RX ADMIN — CARVEDILOL 6.25 MG: 6.25 TABLET, FILM COATED ORAL at 21:42

## 2023-09-10 RX ADMIN — CARVEDILOL 6.25 MG: 6.25 TABLET, FILM COATED ORAL at 09:44

## 2023-09-10 RX ADMIN — SODIUM CHLORIDE, PRESERVATIVE FREE 20 ML: 5 INJECTION INTRAVENOUS at 09:44

## 2023-09-10 RX ADMIN — FOLIC ACID 1 MG: 1 TABLET ORAL at 09:44

## 2023-09-10 RX ADMIN — TIOTROPIUM BROMIDE AND OLODATEROL 2 PUFF: 3.124; 2.736 SPRAY, METERED RESPIRATORY (INHALATION) at 09:20

## 2023-09-10 RX ADMIN — LEVETIRACETAM 500 MG: 500 TABLET, FILM COATED ORAL at 09:43

## 2023-09-10 RX ADMIN — SODIUM CHLORIDE, PRESERVATIVE FREE 10 ML: 5 INJECTION INTRAVENOUS at 21:45

## 2023-09-10 RX ADMIN — LEVETIRACETAM 500 MG: 500 TABLET, FILM COATED ORAL at 21:42

## 2023-09-10 RX ADMIN — HEPARIN SODIUM 5000 UNITS: 5000 INJECTION INTRAVENOUS; SUBCUTANEOUS at 21:43

## 2023-09-10 ASSESSMENT — ENCOUNTER SYMPTOMS
VOMITING: 0
SHORTNESS OF BREATH: 0
CONSTIPATION: 0
ANAL BLEEDING: 0
CHEST TIGHTNESS: 0
NAUSEA: 0
DIARRHEA: 0
COUGH: 0

## 2023-09-10 ASSESSMENT — PAIN SCALES - GENERAL: PAINLEVEL_OUTOF10: 2

## 2023-09-11 ENCOUNTER — APPOINTMENT (OUTPATIENT)
Age: 58
DRG: 720 | End: 2023-09-11
Payer: MEDICAID

## 2023-09-11 LAB
ANION GAP SERPL CALCULATED.3IONS-SCNC: 12 MMOL/L (ref 9–17)
BASOPHILS # BLD: 0.16 K/UL (ref 0–0.2)
BASOPHILS NFR BLD: 1 % (ref 0–2)
BUN SERPL-MCNC: 39 MG/DL (ref 6–20)
CALCIUM SERPL-MCNC: 8.2 MG/DL (ref 8.6–10.4)
CHLORIDE SERPL-SCNC: 102 MMOL/L (ref 98–107)
CO2 SERPL-SCNC: 24 MMOL/L (ref 20–31)
CREAT SERPL-MCNC: 2.8 MG/DL (ref 0.7–1.2)
ECHO AO ASC DIAM: 2.9 CM
ECHO AO ASCENDING AORTA INDEX: 1.8 CM/M2
ECHO AO ROOT DIAM: 3.4 CM
ECHO AO ROOT INDEX: 2.11 CM/M2
ECHO AV AREA PEAK VELOCITY: 3.7 CM2
ECHO AV AREA VTI: 3.7 CM2
ECHO AV AREA/BSA PEAK VELOCITY: 2.3 CM2/M2
ECHO AV AREA/BSA VTI: 2.3 CM2/M2
ECHO AV MEAN GRADIENT: 2 MMHG
ECHO AV MEAN VELOCITY: 0.8 M/S
ECHO AV PEAK GRADIENT: 4 MMHG
ECHO AV PEAK VELOCITY: 1.1 M/S
ECHO AV VELOCITY RATIO: 0.91
ECHO AV VTI: 20.4 CM
ECHO LA AREA 2C: 14.2 CM2
ECHO LA AREA 4C: 15.2 CM2
ECHO LA DIAMETER INDEX: 2.61 CM/M2
ECHO LA DIAMETER: 4.2 CM
ECHO LA MAJOR AXIS: 4.8 CM
ECHO LA MINOR AXIS: 4.9 CM
ECHO LA TO AORTIC ROOT RATIO: 1.24
ECHO LA VOL 2C: 32 ML (ref 18–58)
ECHO LA VOL 4C: 38 ML (ref 18–58)
ECHO LA VOL BP: 35 ML (ref 18–58)
ECHO LA VOL/BSA BIPLANE: 22 ML/M2 (ref 16–34)
ECHO LA VOLUME INDEX A2C: 20 ML/M2 (ref 16–34)
ECHO LA VOLUME INDEX A4C: 24 ML/M2 (ref 16–34)
ECHO LV E' LATERAL VELOCITY: 6 CM/S
ECHO LV EDV A2C: 115 ML
ECHO LV EDV A4C: 118 ML
ECHO LV EDV INDEX A4C: 73 ML/M2
ECHO LV EDV NDEX A2C: 71 ML/M2
ECHO LV EF PHYSICIAN: 40 %
ECHO LV EJECTION FRACTION A2C: 49 %
ECHO LV EJECTION FRACTION A4C: 39 %
ECHO LV EJECTION FRACTION BIPLANE: 45 % (ref 55–100)
ECHO LV ESV A2C: 58 ML
ECHO LV ESV A4C: 72 ML
ECHO LV ESV INDEX A2C: 36 ML/M2
ECHO LV ESV INDEX A4C: 45 ML/M2
ECHO LV FRACTIONAL SHORTENING: 38 % (ref 28–44)
ECHO LV INTERNAL DIMENSION DIASTOLE INDEX: 2.61 CM/M2
ECHO LV INTERNAL DIMENSION DIASTOLIC: 4.2 CM (ref 4.2–5.9)
ECHO LV INTERNAL DIMENSION SYSTOLIC INDEX: 1.61 CM/M2
ECHO LV INTERNAL DIMENSION SYSTOLIC: 2.6 CM
ECHO LV IVSD: 1.1 CM (ref 0.6–1)
ECHO LV MASS 2D: 157.1 G (ref 88–224)
ECHO LV MASS INDEX 2D: 97.6 G/M2 (ref 49–115)
ECHO LV POSTERIOR WALL DIASTOLIC: 1.1 CM (ref 0.6–1)
ECHO LV RELATIVE WALL THICKNESS RATIO: 0.52
ECHO LVOT AREA: 3.8 CM2
ECHO LVOT AV VTI INDEX: 0.97
ECHO LVOT DIAM: 2.2 CM
ECHO LVOT MEAN GRADIENT: 2 MMHG
ECHO LVOT PEAK GRADIENT: 4 MMHG
ECHO LVOT PEAK VELOCITY: 1 M/S
ECHO LVOT STROKE VOLUME INDEX: 46.5 ML/M2
ECHO LVOT SV: 74.8 ML
ECHO LVOT VTI: 19.7 CM
ECHO MV AREA VTI: 4.2 CM2
ECHO MV E DECELERATION TIME (DT): 137 MS
ECHO MV E VELOCITY: 1.46 M/S
ECHO MV E/E' LATERAL: 24.33
ECHO MV LVOT VTI INDEX: 0.91
ECHO MV MAX VELOCITY: 1.2 M/S
ECHO MV MEAN GRADIENT: 2 MMHG
ECHO MV MEAN VELOCITY: 0.6 M/S
ECHO MV PEAK GRADIENT: 6 MMHG
ECHO MV VTI: 17.9 CM
ECHO PV MAX VELOCITY: 0.6 M/S
ECHO PV PEAK GRADIENT: 2 MMHG
ECHO RV FREE WALL PEAK S': 11 CM/S
ECHO RV TAPSE: 1.4 CM (ref 1.7–?)
EOSINOPHIL # BLD: 0.98 K/UL (ref 0–0.4)
EOSINOPHILS RELATIVE PERCENT: 6 % (ref 1–4)
ERYTHROCYTE [DISTWIDTH] IN BLOOD BY AUTOMATED COUNT: 14.3 % (ref 11.8–14.4)
GFR SERPL CREATININE-BSD FRML MDRD: 25 ML/MIN/1.73M2
GLUCOSE BLD-MCNC: 105 MG/DL (ref 75–110)
GLUCOSE BLD-MCNC: 123 MG/DL (ref 75–110)
GLUCOSE BLD-MCNC: 125 MG/DL (ref 75–110)
GLUCOSE BLD-MCNC: 241 MG/DL (ref 75–110)
GLUCOSE SERPL-MCNC: 116 MG/DL (ref 70–99)
HCT VFR BLD AUTO: 27.3 % (ref 40.7–50.3)
HGB BLD-MCNC: 8.4 G/DL (ref 13–17)
IMM GRANULOCYTES # BLD AUTO: 0 K/UL (ref 0–0.3)
IMM GRANULOCYTES NFR BLD: 0 %
LYMPHOCYTES NFR BLD: 2.61 K/UL (ref 1–4.8)
LYMPHOCYTES RELATIVE PERCENT: 16 % (ref 24–44)
MAGNESIUM SERPL-MCNC: 1.8 MG/DL (ref 1.6–2.6)
MCH RBC QN AUTO: 31.1 PG (ref 25.2–33.5)
MCHC RBC AUTO-ENTMCNC: 30.8 G/DL (ref 28.4–34.8)
MCV RBC AUTO: 101.1 FL (ref 82.6–102.9)
MICROORGANISM SPEC CULT: NORMAL
MONOCYTES NFR BLD: 1.14 K/UL (ref 0.1–0.8)
MONOCYTES NFR BLD: 7 % (ref 1–7)
MORPHOLOGY: NORMAL
NEUTROPHILS NFR BLD: 70 % (ref 36–66)
NEUTS SEG NFR BLD: 11.41 K/UL (ref 1.8–7.7)
NRBC BLD-RTO: 0 PER 100 WBC
PHOSPHATE SERPL-MCNC: 3 MG/DL (ref 2.5–4.5)
PLATELET # BLD AUTO: 333 K/UL (ref 138–453)
PMV BLD AUTO: 9.7 FL (ref 8.1–13.5)
POTASSIUM SERPL-SCNC: 3.7 MMOL/L (ref 3.7–5.3)
RBC # BLD AUTO: 2.7 M/UL (ref 4.21–5.77)
SERVICE CMNT-IMP: NORMAL
SODIUM SERPL-SCNC: 138 MMOL/L (ref 135–144)
SPECIMEN DESCRIPTION: NORMAL
WBC OTHER # BLD: 16.3 K/UL (ref 3.5–11.3)

## 2023-09-11 PROCEDURE — 99232 SBSQ HOSP IP/OBS MODERATE 35: CPT | Performed by: INTERNAL MEDICINE

## 2023-09-11 PROCEDURE — 93306 TTE W/DOPPLER COMPLETE: CPT | Performed by: INTERNAL MEDICINE

## 2023-09-11 PROCEDURE — 6370000000 HC RX 637 (ALT 250 FOR IP)

## 2023-09-11 PROCEDURE — 93306 TTE W/DOPPLER COMPLETE: CPT

## 2023-09-11 PROCEDURE — 84100 ASSAY OF PHOSPHORUS: CPT

## 2023-09-11 PROCEDURE — 80048 BASIC METABOLIC PNL TOTAL CA: CPT

## 2023-09-11 PROCEDURE — 6370000000 HC RX 637 (ALT 250 FOR IP): Performed by: STUDENT IN AN ORGANIZED HEALTH CARE EDUCATION/TRAINING PROGRAM

## 2023-09-11 PROCEDURE — 99231 SBSQ HOSP IP/OBS SF/LOW 25: CPT | Performed by: INTERNAL MEDICINE

## 2023-09-11 PROCEDURE — 97112 NEUROMUSCULAR REEDUCATION: CPT

## 2023-09-11 PROCEDURE — 97530 THERAPEUTIC ACTIVITIES: CPT

## 2023-09-11 PROCEDURE — 36415 COLL VENOUS BLD VENIPUNCTURE: CPT

## 2023-09-11 PROCEDURE — 83735 ASSAY OF MAGNESIUM: CPT

## 2023-09-11 PROCEDURE — 6360000002 HC RX W HCPCS

## 2023-09-11 PROCEDURE — 2580000003 HC RX 258: Performed by: STUDENT IN AN ORGANIZED HEALTH CARE EDUCATION/TRAINING PROGRAM

## 2023-09-11 PROCEDURE — 6360000002 HC RX W HCPCS: Performed by: NURSE PRACTITIONER

## 2023-09-11 PROCEDURE — 1200000000 HC SEMI PRIVATE

## 2023-09-11 PROCEDURE — 2500000003 HC RX 250 WO HCPCS

## 2023-09-11 PROCEDURE — 85025 COMPLETE CBC W/AUTO DIFF WBC: CPT

## 2023-09-11 PROCEDURE — 82947 ASSAY GLUCOSE BLOOD QUANT: CPT

## 2023-09-11 RX ORDER — LABETALOL HYDROCHLORIDE 5 MG/ML
20 INJECTION, SOLUTION INTRAVENOUS EVERY 4 HOURS PRN
Status: DISCONTINUED | OUTPATIENT
Start: 2023-09-11 | End: 2023-09-18 | Stop reason: HOSPADM

## 2023-09-11 RX ORDER — AMLODIPINE BESYLATE 5 MG/1
5 TABLET ORAL DAILY
Status: DISCONTINUED | OUTPATIENT
Start: 2023-09-11 | End: 2023-09-15

## 2023-09-11 RX ORDER — INSULIN GLARGINE 100 [IU]/ML
20 INJECTION, SOLUTION SUBCUTANEOUS DAILY
Status: DISCONTINUED | OUTPATIENT
Start: 2023-09-11 | End: 2023-09-13

## 2023-09-11 RX ADMIN — METOPROLOL TARTRATE 5 MG: 1 INJECTION, SOLUTION INTRAVENOUS at 04:59

## 2023-09-11 RX ADMIN — INSULIN GLARGINE 20 UNITS: 100 INJECTION, SOLUTION SUBCUTANEOUS at 10:45

## 2023-09-11 RX ADMIN — FOLIC ACID 1 MG: 1 TABLET ORAL at 10:44

## 2023-09-11 RX ADMIN — INSULIN LISPRO 4 UNITS: 100 INJECTION, SOLUTION INTRAVENOUS; SUBCUTANEOUS at 12:21

## 2023-09-11 RX ADMIN — CARVEDILOL 6.25 MG: 6.25 TABLET, FILM COATED ORAL at 10:45

## 2023-09-11 RX ADMIN — CARVEDILOL 6.25 MG: 6.25 TABLET, FILM COATED ORAL at 20:36

## 2023-09-11 RX ADMIN — CLOPIDOGREL BISULFATE 75 MG: 75 TABLET, FILM COATED ORAL at 10:44

## 2023-09-11 RX ADMIN — SODIUM CHLORIDE, PRESERVATIVE FREE 10 ML: 5 INJECTION INTRAVENOUS at 20:36

## 2023-09-11 RX ADMIN — LEVETIRACETAM 500 MG: 500 TABLET, FILM COATED ORAL at 20:36

## 2023-09-11 RX ADMIN — SODIUM CHLORIDE, PRESERVATIVE FREE 20 ML: 5 INJECTION INTRAVENOUS at 10:44

## 2023-09-11 RX ADMIN — HEPARIN SODIUM 5000 UNITS: 5000 INJECTION INTRAVENOUS; SUBCUTANEOUS at 14:31

## 2023-09-11 RX ADMIN — AMLODIPINE BESYLATE 5 MG: 5 TABLET ORAL at 10:46

## 2023-09-11 RX ADMIN — HEPARIN SODIUM 5000 UNITS: 5000 INJECTION INTRAVENOUS; SUBCUTANEOUS at 23:20

## 2023-09-11 RX ADMIN — LEVETIRACETAM 500 MG: 500 TABLET, FILM COATED ORAL at 10:44

## 2023-09-11 RX ADMIN — PERFLUTREN 1.5 ML: 6.52 INJECTION, SUSPENSION INTRAVENOUS at 09:00

## 2023-09-11 RX ADMIN — HEPARIN SODIUM 5000 UNITS: 5000 INJECTION INTRAVENOUS; SUBCUTANEOUS at 06:04

## 2023-09-11 RX ADMIN — ASPIRIN 81 MG 81 MG: 81 TABLET ORAL at 10:44

## 2023-09-11 ASSESSMENT — ENCOUNTER SYMPTOMS
VOMITING: 0
BLOOD IN STOOL: 0
COUGH: 0
SHORTNESS OF BREATH: 0
COLOR CHANGE: 0
NAUSEA: 0
ABDOMINAL PAIN: 0
WHEEZING: 0

## 2023-09-11 NOTE — CARE COORDINATION
Per PM&R physiatrist Dr. Lefty Mosher, patient appropriate for Acute Inpatient Rehabilitation level of care. Eligibility & Benefits Verified - See Note    Prior authorization request for admission initiated with primary insurance [address] via: Phone Call: Call Ref# [id number], Representative: Sarai Kelley    Pending Case Reference/Authorization # [id number]    Clinical documentation submitted via: clinical review team to contact writer for clinicals when case is assigned    Updated [facility]: Via Voicemail at this time at phone/extension: [phone]     9/12/2023: Update 8:30 am:  Received fax from [facility] requesting clinicals. Clinicals sent at this time.

## 2023-09-11 NOTE — CARE COORDINATION
ACUTE INPATIENT REHABILITATION  Financial Disclosure Statement: Eligibility and Benefit Verification    Patient Name: Aparna Sanches MRN: [id number]     Disclosure [facility] Acute Inpatient Rehabilitation at [facility] provides 24 hour individualized service to patients with functional limitations due to, but not limited to stroke, brain injury, spinal cord injury, major multiple trauma, hip fracture, amputation, and neurological disorders. Acute Inpatient Rehabilitation provides rehabilitative nursing, physician coverage, as well as physical therapy, occupational therapy, speech therapy, recreational therapy and other services as deemed necessary by our Board Certified Physical Medicine and [address], Dr. Kwame Rutledge and Dr. Venu Estrada and Dr. Jennifer Byrnes. [facility] Acute Inpatient Rehabilitation at [facility] is fully accredited by the Commission on Accreditation of Rehabilitation Facilities (CARF) and The Joint Commission (TJC). At a minimum, you will receive 5 days of therapy services totaling at least 15 hours per week. Your treatment program will consist of physical therapy at least 7.5 hours per week; occupational therapy 7.5 hours per week; and speech therapy 1.5 hours per week, as applicable. Your estimated length of stay is currently:  Approximately 2 Weeks  Please Note: Estimated length of stay is based on individual condition and Acute Inpatient Rehabilitation specific needs. Length of stay may vary based upon interdisciplinary team assessment, insurance approval, and patient progression.     Your insurance coverage has been verified as follows:   Date Verified: 9/11/2023   Method:  Phone Call: Representative: Automated Service    Primary Insurance: [address]  Coverage: Per Benefit Period  Plan Effective Date: 4/1/2020 Status: Active  Deductible: Not Applicable  Co-Pay: Not Applicable  Co-Insurance: Not Applicable  Maximum Out

## 2023-09-11 NOTE — CARE COORDINATION
SW provided update to [facility]. Patient has referral to [facility]. If accepted will have HD on-site. If patient needs OP HD, has chair at [facility] MWF 5:15am. Will keep OP HD referral open until ARU is confirmed. 1520  Patient HD cath being removed, patient showing signs of renal recovery.  Notified [facility] Admissions to cancel referral.

## 2023-09-12 PROBLEM — Z99.2 DEPENDENCE ON RENAL DIALYSIS (HCC): Status: ACTIVE | Noted: 2023-09-12

## 2023-09-12 LAB
ANION GAP SERPL CALCULATED.3IONS-SCNC: 13 MMOL/L (ref 9–17)
BASOPHILS # BLD: 0.12 K/UL (ref 0–0.2)
BASOPHILS NFR BLD: 1 % (ref 0–2)
BUN SERPL-MCNC: 45 MG/DL (ref 6–20)
CALCIUM SERPL-MCNC: 8.8 MG/DL (ref 8.6–10.4)
CHLORIDE SERPL-SCNC: 103 MMOL/L (ref 98–107)
CO2 SERPL-SCNC: 23 MMOL/L (ref 20–31)
CREAT SERPL-MCNC: 2.9 MG/DL (ref 0.7–1.2)
EOSINOPHIL # BLD: 0.56 K/UL (ref 0–0.44)
EOSINOPHILS RELATIVE PERCENT: 4 % (ref 1–4)
ERYTHROCYTE [DISTWIDTH] IN BLOOD BY AUTOMATED COUNT: 14.3 % (ref 11.8–14.4)
GFR SERPL CREATININE-BSD FRML MDRD: 24 ML/MIN/1.73M2
GLUCOSE BLD-MCNC: 167 MG/DL (ref 75–110)
GLUCOSE BLD-MCNC: 287 MG/DL (ref 75–110)
GLUCOSE BLD-MCNC: 49 MG/DL (ref 75–110)
GLUCOSE BLD-MCNC: 78 MG/DL (ref 75–110)
GLUCOSE BLD-MCNC: 85 MG/DL (ref 75–110)
GLUCOSE SERPL-MCNC: 259 MG/DL (ref 70–99)
HCT VFR BLD AUTO: 27.5 % (ref 40.7–50.3)
HGB BLD-MCNC: 8.5 G/DL (ref 13–17)
IMM GRANULOCYTES # BLD AUTO: 0.07 K/UL (ref 0–0.3)
IMM GRANULOCYTES NFR BLD: 0 %
LYMPHOCYTES NFR BLD: 2.48 K/UL (ref 1.1–3.7)
LYMPHOCYTES RELATIVE PERCENT: 16 % (ref 24–43)
MCH RBC QN AUTO: 31.1 PG (ref 25.2–33.5)
MCHC RBC AUTO-ENTMCNC: 30.9 G/DL (ref 28.4–34.8)
MCV RBC AUTO: 100.7 FL (ref 82.6–102.9)
MONOCYTES NFR BLD: 1.18 K/UL (ref 0.1–1.2)
MONOCYTES NFR BLD: 7 % (ref 3–12)
NEUTROPHILS NFR BLD: 73 % (ref 36–65)
NEUTS SEG NFR BLD: 11.64 K/UL (ref 1.5–8.1)
NRBC BLD-RTO: 0 PER 100 WBC
PLATELET # BLD AUTO: 360 K/UL (ref 138–453)
PMV BLD AUTO: 10.3 FL (ref 8.1–13.5)
POTASSIUM SERPL-SCNC: 3.8 MMOL/L (ref 3.7–5.3)
RBC # BLD AUTO: 2.73 M/UL (ref 4.21–5.77)
SODIUM SERPL-SCNC: 139 MMOL/L (ref 135–144)
WBC OTHER # BLD: 16.1 K/UL (ref 3.5–11.3)

## 2023-09-12 PROCEDURE — 97530 THERAPEUTIC ACTIVITIES: CPT

## 2023-09-12 PROCEDURE — 2580000003 HC RX 258: Performed by: STUDENT IN AN ORGANIZED HEALTH CARE EDUCATION/TRAINING PROGRAM

## 2023-09-12 PROCEDURE — 85025 COMPLETE CBC W/AUTO DIFF WBC: CPT

## 2023-09-12 PROCEDURE — 6370000000 HC RX 637 (ALT 250 FOR IP)

## 2023-09-12 PROCEDURE — 1200000000 HC SEMI PRIVATE

## 2023-09-12 PROCEDURE — 97110 THERAPEUTIC EXERCISES: CPT

## 2023-09-12 PROCEDURE — 99232 SBSQ HOSP IP/OBS MODERATE 35: CPT | Performed by: INTERNAL MEDICINE

## 2023-09-12 PROCEDURE — 6360000002 HC RX W HCPCS

## 2023-09-12 PROCEDURE — 82947 ASSAY GLUCOSE BLOOD QUANT: CPT

## 2023-09-12 PROCEDURE — 6370000000 HC RX 637 (ALT 250 FOR IP): Performed by: STUDENT IN AN ORGANIZED HEALTH CARE EDUCATION/TRAINING PROGRAM

## 2023-09-12 PROCEDURE — 36415 COLL VENOUS BLD VENIPUNCTURE: CPT

## 2023-09-12 PROCEDURE — 94760 N-INVAS EAR/PLS OXIMETRY 1: CPT

## 2023-09-12 PROCEDURE — 94640 AIRWAY INHALATION TREATMENT: CPT

## 2023-09-12 PROCEDURE — 80048 BASIC METABOLIC PNL TOTAL CA: CPT

## 2023-09-12 PROCEDURE — 99231 SBSQ HOSP IP/OBS SF/LOW 25: CPT | Performed by: INTERNAL MEDICINE

## 2023-09-12 RX ADMIN — CARVEDILOL 6.25 MG: 6.25 TABLET, FILM COATED ORAL at 20:19

## 2023-09-12 RX ADMIN — CARVEDILOL 6.25 MG: 6.25 TABLET, FILM COATED ORAL at 08:27

## 2023-09-12 RX ADMIN — TIOTROPIUM BROMIDE AND OLODATEROL 2 PUFF: 3.124; 2.736 SPRAY, METERED RESPIRATORY (INHALATION) at 08:56

## 2023-09-12 RX ADMIN — LABETALOL HYDROCHLORIDE 20 MG: 5 INJECTION, SOLUTION INTRAVENOUS at 21:44

## 2023-09-12 RX ADMIN — HEPARIN SODIUM 5000 UNITS: 5000 INJECTION INTRAVENOUS; SUBCUTANEOUS at 06:03

## 2023-09-12 RX ADMIN — AMLODIPINE BESYLATE 5 MG: 5 TABLET ORAL at 09:40

## 2023-09-12 RX ADMIN — HEPARIN SODIUM 5000 UNITS: 5000 INJECTION INTRAVENOUS; SUBCUTANEOUS at 15:42

## 2023-09-12 RX ADMIN — INSULIN LISPRO 8 UNITS: 100 INJECTION, SOLUTION INTRAVENOUS; SUBCUTANEOUS at 12:59

## 2023-09-12 RX ADMIN — SODIUM CHLORIDE, PRESERVATIVE FREE 10 ML: 5 INJECTION INTRAVENOUS at 08:53

## 2023-09-12 RX ADMIN — ASPIRIN 81 MG 81 MG: 81 TABLET ORAL at 08:26

## 2023-09-12 RX ADMIN — HEPARIN SODIUM 5000 UNITS: 5000 INJECTION INTRAVENOUS; SUBCUTANEOUS at 21:49

## 2023-09-12 RX ADMIN — FOLIC ACID 1 MG: 1 TABLET ORAL at 09:40

## 2023-09-12 RX ADMIN — CLOPIDOGREL BISULFATE 75 MG: 75 TABLET, FILM COATED ORAL at 08:26

## 2023-09-12 RX ADMIN — LEVETIRACETAM 500 MG: 500 TABLET, FILM COATED ORAL at 20:19

## 2023-09-12 RX ADMIN — SODIUM CHLORIDE, PRESERVATIVE FREE 10 ML: 5 INJECTION INTRAVENOUS at 20:19

## 2023-09-12 RX ADMIN — INSULIN GLARGINE 20 UNITS: 100 INJECTION, SOLUTION SUBCUTANEOUS at 12:59

## 2023-09-12 RX ADMIN — LEVETIRACETAM 500 MG: 500 TABLET, FILM COATED ORAL at 08:26

## 2023-09-12 ASSESSMENT — PAIN DESCRIPTION - ONSET: ONSET: ON-GOING

## 2023-09-12 ASSESSMENT — ENCOUNTER SYMPTOMS
VOMITING: 0
NAUSEA: 0
ABDOMINAL PAIN: 0
WHEEZING: 0
COUGH: 1
SHORTNESS OF BREATH: 0

## 2023-09-12 ASSESSMENT — PAIN SCALES - GENERAL
PAINLEVEL_OUTOF10: 0
PAINLEVEL_OUTOF10: 2

## 2023-09-12 ASSESSMENT — PAIN DESCRIPTION - ORIENTATION: ORIENTATION: LEFT

## 2023-09-12 ASSESSMENT — PAIN DESCRIPTION - FREQUENCY: FREQUENCY: CONTINUOUS

## 2023-09-12 ASSESSMENT — PAIN DESCRIPTION - LOCATION: LOCATION: SHOULDER

## 2023-09-12 ASSESSMENT — PAIN DESCRIPTION - PAIN TYPE: TYPE: ACUTE PAIN;CHRONIC PAIN

## 2023-09-12 ASSESSMENT — PAIN - FUNCTIONAL ASSESSMENT: PAIN_FUNCTIONAL_ASSESSMENT: ACTIVITIES ARE NOT PREVENTED

## 2023-09-12 ASSESSMENT — PAIN DESCRIPTION - DESCRIPTORS: DESCRIPTORS: ACHING

## 2023-09-13 PROBLEM — E11.9 DIABETES MELLITUS (HCC): Status: ACTIVE | Noted: 2023-03-10

## 2023-09-13 LAB
ANION GAP SERPL CALCULATED.3IONS-SCNC: 10 MMOL/L (ref 9–17)
BASOPHILS # BLD: 0.11 K/UL (ref 0–0.2)
BASOPHILS NFR BLD: 1 % (ref 0–2)
BUN SERPL-MCNC: 43 MG/DL (ref 6–20)
CALCIUM SERPL-MCNC: 8.7 MG/DL (ref 8.6–10.4)
CHLORIDE SERPL-SCNC: 104 MMOL/L (ref 98–107)
CO2 SERPL-SCNC: 23 MMOL/L (ref 20–31)
CREAT SERPL-MCNC: 2.5 MG/DL (ref 0.7–1.2)
EOSINOPHIL # BLD: 0.49 K/UL (ref 0–0.44)
EOSINOPHILS RELATIVE PERCENT: 4 % (ref 1–4)
ERYTHROCYTE [DISTWIDTH] IN BLOOD BY AUTOMATED COUNT: 14.3 % (ref 11.8–14.4)
GFR SERPL CREATININE-BSD FRML MDRD: 29 ML/MIN/1.73M2
GLUCOSE BLD-MCNC: 126 MG/DL (ref 75–110)
GLUCOSE BLD-MCNC: 200 MG/DL (ref 75–110)
GLUCOSE BLD-MCNC: 78 MG/DL (ref 75–110)
GLUCOSE BLD-MCNC: 84 MG/DL (ref 75–110)
GLUCOSE SERPL-MCNC: 76 MG/DL (ref 70–99)
HCT VFR BLD AUTO: 27.2 % (ref 40.7–50.3)
HGB BLD-MCNC: 8.4 G/DL (ref 13–17)
IMM GRANULOCYTES # BLD AUTO: 0.08 K/UL (ref 0–0.3)
IMM GRANULOCYTES NFR BLD: 1 %
LYMPHOCYTES NFR BLD: 2.08 K/UL (ref 1.1–3.7)
LYMPHOCYTES RELATIVE PERCENT: 15 % (ref 24–43)
MCH RBC QN AUTO: 31 PG (ref 25.2–33.5)
MCHC RBC AUTO-ENTMCNC: 30.9 G/DL (ref 28.4–34.8)
MCV RBC AUTO: 100.4 FL (ref 82.6–102.9)
MONOCYTES NFR BLD: 0.93 K/UL (ref 0.1–1.2)
MONOCYTES NFR BLD: 7 % (ref 3–12)
NEUTROPHILS NFR BLD: 72 % (ref 36–65)
NEUTS SEG NFR BLD: 10.37 K/UL (ref 1.5–8.1)
NRBC BLD-RTO: 0 PER 100 WBC
PLATELET # BLD AUTO: 386 K/UL (ref 138–453)
PMV BLD AUTO: 9.9 FL (ref 8.1–13.5)
POTASSIUM SERPL-SCNC: 3.8 MMOL/L (ref 3.7–5.3)
RBC # BLD AUTO: 2.71 M/UL (ref 4.21–5.77)
SODIUM SERPL-SCNC: 137 MMOL/L (ref 135–144)
WBC OTHER # BLD: 14.1 K/UL (ref 3.5–11.3)

## 2023-09-13 PROCEDURE — 85025 COMPLETE CBC W/AUTO DIFF WBC: CPT

## 2023-09-13 PROCEDURE — 6360000002 HC RX W HCPCS

## 2023-09-13 PROCEDURE — 6370000000 HC RX 637 (ALT 250 FOR IP): Performed by: STUDENT IN AN ORGANIZED HEALTH CARE EDUCATION/TRAINING PROGRAM

## 2023-09-13 PROCEDURE — 6370000000 HC RX 637 (ALT 250 FOR IP)

## 2023-09-13 PROCEDURE — 99232 SBSQ HOSP IP/OBS MODERATE 35: CPT | Performed by: INTERNAL MEDICINE

## 2023-09-13 PROCEDURE — 94640 AIRWAY INHALATION TREATMENT: CPT

## 2023-09-13 PROCEDURE — 1200000000 HC SEMI PRIVATE

## 2023-09-13 PROCEDURE — 97535 SELF CARE MNGMENT TRAINING: CPT

## 2023-09-13 PROCEDURE — 36415 COLL VENOUS BLD VENIPUNCTURE: CPT

## 2023-09-13 PROCEDURE — 80048 BASIC METABOLIC PNL TOTAL CA: CPT

## 2023-09-13 PROCEDURE — 6370000000 HC RX 637 (ALT 250 FOR IP): Performed by: INTERNAL MEDICINE

## 2023-09-13 PROCEDURE — 99231 SBSQ HOSP IP/OBS SF/LOW 25: CPT | Performed by: INTERNAL MEDICINE

## 2023-09-13 PROCEDURE — 2580000003 HC RX 258: Performed by: STUDENT IN AN ORGANIZED HEALTH CARE EDUCATION/TRAINING PROGRAM

## 2023-09-13 PROCEDURE — 82947 ASSAY GLUCOSE BLOOD QUANT: CPT

## 2023-09-13 RX ORDER — INSULIN GLARGINE 100 [IU]/ML
15 INJECTION, SOLUTION SUBCUTANEOUS DAILY
Status: DISCONTINUED | OUTPATIENT
Start: 2023-09-13 | End: 2023-09-14

## 2023-09-13 RX ADMIN — CARVEDILOL 6.25 MG: 6.25 TABLET, FILM COATED ORAL at 20:11

## 2023-09-13 RX ADMIN — CARVEDILOL 6.25 MG: 6.25 TABLET, FILM COATED ORAL at 08:30

## 2023-09-13 RX ADMIN — HEPARIN SODIUM 5000 UNITS: 5000 INJECTION INTRAVENOUS; SUBCUTANEOUS at 20:12

## 2023-09-13 RX ADMIN — INSULIN LISPRO 4 UNITS: 100 INJECTION, SOLUTION INTRAVENOUS; SUBCUTANEOUS at 12:50

## 2023-09-13 RX ADMIN — HEPARIN SODIUM 5000 UNITS: 5000 INJECTION INTRAVENOUS; SUBCUTANEOUS at 06:09

## 2023-09-13 RX ADMIN — HEPARIN SODIUM 5000 UNITS: 5000 INJECTION INTRAVENOUS; SUBCUTANEOUS at 13:53

## 2023-09-13 RX ADMIN — SODIUM CHLORIDE, PRESERVATIVE FREE 10 ML: 5 INJECTION INTRAVENOUS at 20:12

## 2023-09-13 RX ADMIN — CLOPIDOGREL BISULFATE 75 MG: 75 TABLET, FILM COATED ORAL at 08:30

## 2023-09-13 RX ADMIN — ASPIRIN 81 MG 81 MG: 81 TABLET ORAL at 08:30

## 2023-09-13 RX ADMIN — LEVETIRACETAM 500 MG: 500 TABLET, FILM COATED ORAL at 20:11

## 2023-09-13 RX ADMIN — SODIUM CHLORIDE, PRESERVATIVE FREE 10 ML: 5 INJECTION INTRAVENOUS at 08:31

## 2023-09-13 RX ADMIN — LEVETIRACETAM 500 MG: 500 TABLET, FILM COATED ORAL at 08:30

## 2023-09-13 RX ADMIN — TIOTROPIUM BROMIDE AND OLODATEROL 2 PUFF: 3.124; 2.736 SPRAY, METERED RESPIRATORY (INHALATION) at 10:32

## 2023-09-13 RX ADMIN — FOLIC ACID 1 MG: 1 TABLET ORAL at 08:30

## 2023-09-13 RX ADMIN — INSULIN GLARGINE 15 UNITS: 100 INJECTION, SOLUTION SUBCUTANEOUS at 09:19

## 2023-09-13 RX ADMIN — AMLODIPINE BESYLATE 5 MG: 5 TABLET ORAL at 08:30

## 2023-09-13 ASSESSMENT — ENCOUNTER SYMPTOMS
NAUSEA: 0
APNEA: 0
VOMITING: 0
SHORTNESS OF BREATH: 0
WHEEZING: 0
COUGH: 0
STRIDOR: 0

## 2023-09-13 ASSESSMENT — PAIN SCALES - GENERAL: PAINLEVEL_OUTOF10: 3

## 2023-09-13 NOTE — CARE COORDINATION
Received Telephone Call from [address]. Intent to deny admission to Acute Inpatient Rehabilitation Stay under Auth/CaseRef# [id number]     Rationale to deny Acute Inpatient Rehabilitation level of care: Patients     Peer to Peer Deadline: Friday, 9/15 by 3:00 pm EST    Peer to Peer Instructions: Call Chava Terrell [phone] to schedule peer to peer. Must give 2 hour window of availability for physician to complete.     PM&R physiatrist Dr. Josephine Menchaca, scheduled to complete peer to peer 9/14/2023 between 9:00 am - 1:00 pm.     Updated 3B CM: Via Telephone Conversation at this time at phone/extension: [phone]

## 2023-09-13 NOTE — CARE COORDINATION
Transitional planning      Call from [facility] with [facility], denial for placement , peer to peer requested , she notified Dr Víctor Barrientos to complete. 1620 call from [facility], peer to peer to  be completed between 9 and 1 tomorrow.

## 2023-09-14 LAB
ANION GAP SERPL CALCULATED.3IONS-SCNC: 11 MMOL/L (ref 9–17)
BASOPHILS # BLD: 0.1 K/UL (ref 0–0.2)
BASOPHILS NFR BLD: 1 % (ref 0–2)
BUN SERPL-MCNC: 41 MG/DL (ref 6–20)
CALCIUM SERPL-MCNC: 8.7 MG/DL (ref 8.6–10.4)
CHLORIDE SERPL-SCNC: 103 MMOL/L (ref 98–107)
CO2 SERPL-SCNC: 22 MMOL/L (ref 20–31)
CREAT SERPL-MCNC: 2.4 MG/DL (ref 0.7–1.2)
EOSINOPHIL # BLD: 0.39 K/UL (ref 0–0.44)
EOSINOPHILS RELATIVE PERCENT: 3 % (ref 1–4)
ERYTHROCYTE [DISTWIDTH] IN BLOOD BY AUTOMATED COUNT: 14.5 % (ref 11.8–14.4)
GFR SERPL CREATININE-BSD FRML MDRD: 30 ML/MIN/1.73M2
GLUCOSE BLD-MCNC: 101 MG/DL (ref 75–110)
GLUCOSE BLD-MCNC: 151 MG/DL (ref 75–110)
GLUCOSE BLD-MCNC: 152 MG/DL (ref 75–110)
GLUCOSE BLD-MCNC: 205 MG/DL (ref 75–110)
GLUCOSE SERPL-MCNC: 95 MG/DL (ref 70–99)
HCT VFR BLD AUTO: 24.5 % (ref 40.7–50.3)
HGB BLD-MCNC: 8.5 G/DL (ref 13–17)
IMM GRANULOCYTES # BLD AUTO: 0.06 K/UL (ref 0–0.3)
IMM GRANULOCYTES NFR BLD: 1 %
LYMPHOCYTES NFR BLD: 1.9 K/UL (ref 1.1–3.7)
LYMPHOCYTES RELATIVE PERCENT: 16 % (ref 24–43)
MCH RBC QN AUTO: 34.7 PG (ref 25.2–33.5)
MCHC RBC AUTO-ENTMCNC: 34.7 G/DL (ref 28.4–34.8)
MCV RBC AUTO: 100 FL (ref 82.6–102.9)
MONOCYTES NFR BLD: 1.03 K/UL (ref 0.1–1.2)
MONOCYTES NFR BLD: 9 % (ref 3–12)
NEUTROPHILS NFR BLD: 70 % (ref 36–65)
NEUTS SEG NFR BLD: 8.17 K/UL (ref 1.5–8.1)
NRBC BLD-RTO: 0 PER 100 WBC
PLATELET # BLD AUTO: 421 K/UL (ref 138–453)
PMV BLD AUTO: 10.5 FL (ref 8.1–13.5)
POTASSIUM SERPL-SCNC: 4.5 MMOL/L (ref 3.7–5.3)
RBC # BLD AUTO: 2.45 M/UL (ref 4.21–5.77)
RBC # BLD: ABNORMAL 10*6/UL
SODIUM SERPL-SCNC: 136 MMOL/L (ref 135–144)
WBC OTHER # BLD: 11.7 K/UL (ref 3.5–11.3)

## 2023-09-14 PROCEDURE — 6370000000 HC RX 637 (ALT 250 FOR IP)

## 2023-09-14 PROCEDURE — 6360000002 HC RX W HCPCS

## 2023-09-14 PROCEDURE — 97530 THERAPEUTIC ACTIVITIES: CPT

## 2023-09-14 PROCEDURE — 36415 COLL VENOUS BLD VENIPUNCTURE: CPT

## 2023-09-14 PROCEDURE — 6370000000 HC RX 637 (ALT 250 FOR IP): Performed by: STUDENT IN AN ORGANIZED HEALTH CARE EDUCATION/TRAINING PROGRAM

## 2023-09-14 PROCEDURE — 97110 THERAPEUTIC EXERCISES: CPT

## 2023-09-14 PROCEDURE — 94761 N-INVAS EAR/PLS OXIMETRY MLT: CPT

## 2023-09-14 PROCEDURE — 80048 BASIC METABOLIC PNL TOTAL CA: CPT

## 2023-09-14 PROCEDURE — 6370000000 HC RX 637 (ALT 250 FOR IP): Performed by: INTERNAL MEDICINE

## 2023-09-14 PROCEDURE — 99238 HOSP IP/OBS DSCHRG MGMT 30/<: CPT | Performed by: INTERNAL MEDICINE

## 2023-09-14 PROCEDURE — 2580000003 HC RX 258: Performed by: STUDENT IN AN ORGANIZED HEALTH CARE EDUCATION/TRAINING PROGRAM

## 2023-09-14 PROCEDURE — 85025 COMPLETE CBC W/AUTO DIFF WBC: CPT

## 2023-09-14 PROCEDURE — 82947 ASSAY GLUCOSE BLOOD QUANT: CPT

## 2023-09-14 PROCEDURE — 94640 AIRWAY INHALATION TREATMENT: CPT

## 2023-09-14 PROCEDURE — 1200000000 HC SEMI PRIVATE

## 2023-09-14 RX ORDER — CARVEDILOL 6.25 MG/1
6.25 TABLET ORAL 2 TIMES DAILY
Qty: 60 TABLET | Refills: 3 | DISCHARGE
Start: 2023-09-14

## 2023-09-14 RX ORDER — INSULIN GLARGINE 100 [IU]/ML
15 INJECTION, SOLUTION SUBCUTANEOUS DAILY
Status: DISCONTINUED | OUTPATIENT
Start: 2023-09-15 | End: 2023-09-15

## 2023-09-14 RX ORDER — INSULIN GLARGINE 100 [IU]/ML
10 INJECTION, SOLUTION SUBCUTANEOUS NIGHTLY
Status: DISCONTINUED | OUTPATIENT
Start: 2023-09-14 | End: 2023-09-15

## 2023-09-14 RX ORDER — INSULIN GLARGINE 100 [IU]/ML
10 INJECTION, SOLUTION SUBCUTANEOUS 2 TIMES DAILY
Status: DISCONTINUED | OUTPATIENT
Start: 2023-09-14 | End: 2023-09-14

## 2023-09-14 RX ORDER — AMLODIPINE BESYLATE 5 MG/1
5 TABLET ORAL DAILY
Qty: 30 TABLET | Refills: 3 | DISCHARGE
Start: 2023-09-15

## 2023-09-14 RX ADMIN — CLOPIDOGREL BISULFATE 75 MG: 75 TABLET, FILM COATED ORAL at 08:56

## 2023-09-14 RX ADMIN — CARVEDILOL 6.25 MG: 6.25 TABLET, FILM COATED ORAL at 21:19

## 2023-09-14 RX ADMIN — SODIUM CHLORIDE, PRESERVATIVE FREE 10 ML: 5 INJECTION INTRAVENOUS at 21:20

## 2023-09-14 RX ADMIN — LEVETIRACETAM 500 MG: 500 TABLET, FILM COATED ORAL at 08:56

## 2023-09-14 RX ADMIN — LEVETIRACETAM 500 MG: 500 TABLET, FILM COATED ORAL at 21:19

## 2023-09-14 RX ADMIN — HEPARIN SODIUM 5000 UNITS: 5000 INJECTION INTRAVENOUS; SUBCUTANEOUS at 14:14

## 2023-09-14 RX ADMIN — AMLODIPINE BESYLATE 5 MG: 5 TABLET ORAL at 08:56

## 2023-09-14 RX ADMIN — HEPARIN SODIUM 5000 UNITS: 5000 INJECTION INTRAVENOUS; SUBCUTANEOUS at 06:19

## 2023-09-14 RX ADMIN — FOLIC ACID 1 MG: 1 TABLET ORAL at 08:56

## 2023-09-14 RX ADMIN — TIOTROPIUM BROMIDE AND OLODATEROL 2 PUFF: 3.124; 2.736 SPRAY, METERED RESPIRATORY (INHALATION) at 08:16

## 2023-09-14 RX ADMIN — ASPIRIN 81 MG 81 MG: 81 TABLET ORAL at 08:56

## 2023-09-14 RX ADMIN — INSULIN GLARGINE 10 UNITS: 100 INJECTION, SOLUTION SUBCUTANEOUS at 21:19

## 2023-09-14 RX ADMIN — SODIUM CHLORIDE, PRESERVATIVE FREE 10 ML: 5 INJECTION INTRAVENOUS at 08:57

## 2023-09-14 RX ADMIN — CARVEDILOL 6.25 MG: 6.25 TABLET, FILM COATED ORAL at 08:56

## 2023-09-14 RX ADMIN — INSULIN GLARGINE 15 UNITS: 100 INJECTION, SOLUTION SUBCUTANEOUS at 09:03

## 2023-09-14 RX ADMIN — HEPARIN SODIUM 5000 UNITS: 5000 INJECTION INTRAVENOUS; SUBCUTANEOUS at 21:19

## 2023-09-14 ASSESSMENT — ENCOUNTER SYMPTOMS
NAUSEA: 0
SHORTNESS OF BREATH: 0
VOMITING: 0
STRIDOR: 0
WHEEZING: 0
APNEA: 0
COUGH: 0

## 2023-09-14 NOTE — CARE COORDINATION
Received notice from Anthony at [facility] that denial was upheld. Spoke with patient, provided him with SNF list.  Will provide opportunity to review, will come back for choices this afternoon                       Post Acute Facility/Agency List     Provided patient with the following list, the list includes the overall star ratings obtained from CMS per the Medicare Web site (www.Medicare.gov):     [] [address]  [] Acute Inpatient Rehabilitation Facilities  [x] Skilled Nursing Facilities  [] Home Care    Provided verbal instructions on how to utilize the QR Code to obtain additional detailed star ratings from www. Medicare. gov     offered to print and provide the detailed list:    []Accepted   []Declined    The following printed detailed lists were provided:           [phone] met with patient to discuss SNF, he'd like referral to [facility], referral faxed    [phone] spoke with Dot at [facility], she is reviewing and will call me back    Leo Ghotra has accepted and will start precert

## 2023-09-15 PROBLEM — E87.20 METABOLIC ACIDOSIS: Status: RESOLVED | Noted: 2023-09-06 | Resolved: 2023-09-15

## 2023-09-15 PROBLEM — I21.4 NSTEMI (NON-ST ELEVATED MYOCARDIAL INFARCTION) (HCC): Status: RESOLVED | Noted: 2023-09-07 | Resolved: 2023-09-15

## 2023-09-15 PROBLEM — A41.9 SEPTICEMIA (HCC): Status: RESOLVED | Noted: 2023-09-06 | Resolved: 2023-09-15

## 2023-09-15 PROBLEM — I95.9 HYPOTENSION: Status: RESOLVED | Noted: 2023-09-07 | Resolved: 2023-09-15

## 2023-09-15 LAB
ANION GAP SERPL CALCULATED.3IONS-SCNC: 15 MMOL/L (ref 9–17)
BASOPHILS # BLD: 0.12 K/UL (ref 0–0.2)
BASOPHILS NFR BLD: 1 % (ref 0–2)
BUN SERPL-MCNC: 43 MG/DL (ref 6–20)
CALCIUM SERPL-MCNC: 8.9 MG/DL (ref 8.6–10.4)
CHLORIDE SERPL-SCNC: 104 MMOL/L (ref 98–107)
CO2 SERPL-SCNC: 19 MMOL/L (ref 20–31)
CREAT SERPL-MCNC: 2.5 MG/DL (ref 0.7–1.2)
EOSINOPHIL # BLD: 0.33 K/UL (ref 0–0.44)
EOSINOPHILS RELATIVE PERCENT: 3 % (ref 1–4)
ERYTHROCYTE [DISTWIDTH] IN BLOOD BY AUTOMATED COUNT: 14.2 % (ref 11.8–14.4)
GFR SERPL CREATININE-BSD FRML MDRD: 29 ML/MIN/1.73M2
GLUCOSE BLD-MCNC: 135 MG/DL (ref 75–110)
GLUCOSE BLD-MCNC: 157 MG/DL (ref 75–110)
GLUCOSE BLD-MCNC: 48 MG/DL (ref 75–110)
GLUCOSE BLD-MCNC: 51 MG/DL (ref 75–110)
GLUCOSE BLD-MCNC: 79 MG/DL (ref 75–110)
GLUCOSE BLD-MCNC: 80 MG/DL (ref 75–110)
GLUCOSE SERPL-MCNC: 52 MG/DL (ref 70–99)
HCT VFR BLD AUTO: 28.3 % (ref 40.7–50.3)
HGB BLD-MCNC: 8.6 G/DL (ref 13–17)
IMM GRANULOCYTES # BLD AUTO: 0.03 K/UL (ref 0–0.3)
IMM GRANULOCYTES NFR BLD: 0 %
LYMPHOCYTES NFR BLD: 1.75 K/UL (ref 1.1–3.7)
LYMPHOCYTES RELATIVE PERCENT: 16 % (ref 24–43)
MCH RBC QN AUTO: 31.5 PG (ref 25.2–33.5)
MCHC RBC AUTO-ENTMCNC: 30.4 G/DL (ref 28.4–34.8)
MCV RBC AUTO: 103.7 FL (ref 82.6–102.9)
MONOCYTES NFR BLD: 1.03 K/UL (ref 0.1–1.2)
MONOCYTES NFR BLD: 9 % (ref 3–12)
NEUTROPHILS NFR BLD: 71 % (ref 36–65)
NEUTS SEG NFR BLD: 7.93 K/UL (ref 1.5–8.1)
NRBC BLD-RTO: 0 PER 100 WBC
PLATELET # BLD AUTO: 434 K/UL (ref 138–453)
PMV BLD AUTO: 9.6 FL (ref 8.1–13.5)
POTASSIUM SERPL-SCNC: 4 MMOL/L (ref 3.7–5.3)
RBC # BLD AUTO: 2.73 M/UL (ref 4.21–5.77)
RBC # BLD: ABNORMAL 10*6/UL
SODIUM SERPL-SCNC: 138 MMOL/L (ref 135–144)
WBC OTHER # BLD: 11.2 K/UL (ref 3.5–11.3)

## 2023-09-15 PROCEDURE — 80048 BASIC METABOLIC PNL TOTAL CA: CPT

## 2023-09-15 PROCEDURE — 1200000000 HC SEMI PRIVATE

## 2023-09-15 PROCEDURE — 94640 AIRWAY INHALATION TREATMENT: CPT

## 2023-09-15 PROCEDURE — 6370000000 HC RX 637 (ALT 250 FOR IP)

## 2023-09-15 PROCEDURE — 97110 THERAPEUTIC EXERCISES: CPT

## 2023-09-15 PROCEDURE — 85025 COMPLETE CBC W/AUTO DIFF WBC: CPT

## 2023-09-15 PROCEDURE — 99231 SBSQ HOSP IP/OBS SF/LOW 25: CPT | Performed by: INTERNAL MEDICINE

## 2023-09-15 PROCEDURE — 97530 THERAPEUTIC ACTIVITIES: CPT

## 2023-09-15 PROCEDURE — 6370000000 HC RX 637 (ALT 250 FOR IP): Performed by: STUDENT IN AN ORGANIZED HEALTH CARE EDUCATION/TRAINING PROGRAM

## 2023-09-15 PROCEDURE — 2580000003 HC RX 258: Performed by: STUDENT IN AN ORGANIZED HEALTH CARE EDUCATION/TRAINING PROGRAM

## 2023-09-15 PROCEDURE — 94761 N-INVAS EAR/PLS OXIMETRY MLT: CPT

## 2023-09-15 PROCEDURE — 82947 ASSAY GLUCOSE BLOOD QUANT: CPT

## 2023-09-15 PROCEDURE — 36415 COLL VENOUS BLD VENIPUNCTURE: CPT

## 2023-09-15 PROCEDURE — 6360000002 HC RX W HCPCS

## 2023-09-15 RX ORDER — INSULIN GLARGINE 100 [IU]/ML
5 INJECTION, SOLUTION SUBCUTANEOUS 2 TIMES DAILY
Qty: 3 ML | Refills: 0 | DISCHARGE
Start: 2023-09-15 | End: 2023-09-16 | Stop reason: SDUPTHER

## 2023-09-15 RX ORDER — INSULIN GLARGINE 100 [IU]/ML
10 INJECTION, SOLUTION SUBCUTANEOUS DAILY
Status: DISCONTINUED | OUTPATIENT
Start: 2023-09-16 | End: 2023-09-18 | Stop reason: HOSPADM

## 2023-09-15 RX ORDER — INSULIN GLARGINE 100 [IU]/ML
5 INJECTION, SOLUTION SUBCUTANEOUS NIGHTLY
Status: DISCONTINUED | OUTPATIENT
Start: 2023-09-15 | End: 2023-09-17

## 2023-09-15 RX ORDER — AMLODIPINE BESYLATE 10 MG/1
10 TABLET ORAL DAILY
Status: DISCONTINUED | OUTPATIENT
Start: 2023-09-16 | End: 2023-09-17

## 2023-09-15 RX ADMIN — AMLODIPINE BESYLATE 5 MG: 5 TABLET ORAL at 07:59

## 2023-09-15 RX ADMIN — HEPARIN SODIUM 5000 UNITS: 5000 INJECTION INTRAVENOUS; SUBCUTANEOUS at 14:20

## 2023-09-15 RX ADMIN — TIOTROPIUM BROMIDE AND OLODATEROL 2 PUFF: 3.124; 2.736 SPRAY, METERED RESPIRATORY (INHALATION) at 09:53

## 2023-09-15 RX ADMIN — FOLIC ACID 1 MG: 1 TABLET ORAL at 07:59

## 2023-09-15 RX ADMIN — LEVETIRACETAM 500 MG: 500 TABLET, FILM COATED ORAL at 20:58

## 2023-09-15 RX ADMIN — CLOPIDOGREL BISULFATE 75 MG: 75 TABLET, FILM COATED ORAL at 07:59

## 2023-09-15 RX ADMIN — HEPARIN SODIUM 5000 UNITS: 5000 INJECTION INTRAVENOUS; SUBCUTANEOUS at 06:00

## 2023-09-15 RX ADMIN — CARVEDILOL 6.25 MG: 6.25 TABLET, FILM COATED ORAL at 07:59

## 2023-09-15 RX ADMIN — ASPIRIN 81 MG 81 MG: 81 TABLET ORAL at 07:59

## 2023-09-15 RX ADMIN — Medication 16 G: at 07:46

## 2023-09-15 RX ADMIN — LEVETIRACETAM 500 MG: 500 TABLET, FILM COATED ORAL at 07:59

## 2023-09-15 RX ADMIN — SODIUM CHLORIDE, PRESERVATIVE FREE 10 ML: 5 INJECTION INTRAVENOUS at 08:03

## 2023-09-15 RX ADMIN — HEPARIN SODIUM 5000 UNITS: 5000 INJECTION INTRAVENOUS; SUBCUTANEOUS at 20:58

## 2023-09-15 RX ADMIN — SODIUM CHLORIDE, PRESERVATIVE FREE 10 ML: 5 INJECTION INTRAVENOUS at 20:58

## 2023-09-15 RX ADMIN — CARVEDILOL 6.25 MG: 6.25 TABLET, FILM COATED ORAL at 20:58

## 2023-09-15 ASSESSMENT — ENCOUNTER SYMPTOMS
SHORTNESS OF BREATH: 0
STRIDOR: 0
COUGH: 0

## 2023-09-16 PROBLEM — R00.0 SINUS TACHYCARDIA: Status: ACTIVE | Noted: 2023-09-16

## 2023-09-16 LAB
ANION GAP SERPL CALCULATED.3IONS-SCNC: 13 MMOL/L (ref 9–17)
BASOPHILS # BLD: 0.09 K/UL (ref 0–0.2)
BASOPHILS NFR BLD: 1 % (ref 0–2)
BUN SERPL-MCNC: 49 MG/DL (ref 6–20)
CALCIUM SERPL-MCNC: 8.7 MG/DL (ref 8.6–10.4)
CHLORIDE SERPL-SCNC: 104 MMOL/L (ref 98–107)
CO2 SERPL-SCNC: 20 MMOL/L (ref 20–31)
CREAT SERPL-MCNC: 2.5 MG/DL (ref 0.7–1.2)
EOSINOPHIL # BLD: 0.32 K/UL (ref 0–0.44)
EOSINOPHILS RELATIVE PERCENT: 3 % (ref 1–4)
ERYTHROCYTE [DISTWIDTH] IN BLOOD BY AUTOMATED COUNT: 14 % (ref 11.8–14.4)
GFR SERPL CREATININE-BSD FRML MDRD: 29 ML/MIN/1.73M2
GLUCOSE BLD-MCNC: 119 MG/DL (ref 75–110)
GLUCOSE BLD-MCNC: 133 MG/DL (ref 75–110)
GLUCOSE BLD-MCNC: 141 MG/DL (ref 75–110)
GLUCOSE BLD-MCNC: 149 MG/DL (ref 75–110)
GLUCOSE SERPL-MCNC: 199 MG/DL (ref 70–99)
HCT VFR BLD AUTO: 28.7 % (ref 40.7–50.3)
HGB BLD-MCNC: 8.7 G/DL (ref 13–17)
IMM GRANULOCYTES # BLD AUTO: 0.04 K/UL (ref 0–0.3)
IMM GRANULOCYTES NFR BLD: 0 %
LYMPHOCYTES NFR BLD: 1.64 K/UL (ref 1.1–3.7)
LYMPHOCYTES RELATIVE PERCENT: 15 % (ref 24–43)
MCH RBC QN AUTO: 31.4 PG (ref 25.2–33.5)
MCHC RBC AUTO-ENTMCNC: 30.3 G/DL (ref 28.4–34.8)
MCV RBC AUTO: 103.6 FL (ref 82.6–102.9)
MONOCYTES NFR BLD: 0.87 K/UL (ref 0.1–1.2)
MONOCYTES NFR BLD: 8 % (ref 3–12)
NEUTROPHILS NFR BLD: 73 % (ref 36–65)
NEUTS SEG NFR BLD: 8.24 K/UL (ref 1.5–8.1)
NRBC BLD-RTO: 0 PER 100 WBC
PLATELET # BLD AUTO: 446 K/UL (ref 138–453)
PMV BLD AUTO: 9.7 FL (ref 8.1–13.5)
POTASSIUM SERPL-SCNC: 4.4 MMOL/L (ref 3.7–5.3)
RBC # BLD AUTO: 2.77 M/UL (ref 4.21–5.77)
RBC # BLD: ABNORMAL 10*6/UL
SODIUM SERPL-SCNC: 137 MMOL/L (ref 135–144)
WBC OTHER # BLD: 11.2 K/UL (ref 3.5–11.3)

## 2023-09-16 PROCEDURE — 6370000000 HC RX 637 (ALT 250 FOR IP): Performed by: STUDENT IN AN ORGANIZED HEALTH CARE EDUCATION/TRAINING PROGRAM

## 2023-09-16 PROCEDURE — 6370000000 HC RX 637 (ALT 250 FOR IP)

## 2023-09-16 PROCEDURE — 1200000000 HC SEMI PRIVATE

## 2023-09-16 PROCEDURE — 6360000002 HC RX W HCPCS

## 2023-09-16 PROCEDURE — 36415 COLL VENOUS BLD VENIPUNCTURE: CPT

## 2023-09-16 PROCEDURE — 99231 SBSQ HOSP IP/OBS SF/LOW 25: CPT | Performed by: INTERNAL MEDICINE

## 2023-09-16 PROCEDURE — 85025 COMPLETE CBC W/AUTO DIFF WBC: CPT

## 2023-09-16 PROCEDURE — 94760 N-INVAS EAR/PLS OXIMETRY 1: CPT

## 2023-09-16 PROCEDURE — 94640 AIRWAY INHALATION TREATMENT: CPT

## 2023-09-16 PROCEDURE — 82947 ASSAY GLUCOSE BLOOD QUANT: CPT

## 2023-09-16 PROCEDURE — 97530 THERAPEUTIC ACTIVITIES: CPT

## 2023-09-16 PROCEDURE — 80048 BASIC METABOLIC PNL TOTAL CA: CPT

## 2023-09-16 PROCEDURE — 2580000003 HC RX 258: Performed by: STUDENT IN AN ORGANIZED HEALTH CARE EDUCATION/TRAINING PROGRAM

## 2023-09-16 RX ORDER — INSULIN GLARGINE 100 [IU]/ML
INJECTION, SOLUTION SUBCUTANEOUS
Qty: 3 ML | Refills: 2 | Status: SHIPPED | OUTPATIENT
Start: 2023-09-16 | End: 2023-09-16 | Stop reason: SDUPTHER

## 2023-09-16 RX ORDER — INSULIN GLARGINE 100 [IU]/ML
INJECTION, SOLUTION SUBCUTANEOUS
Qty: 3 ML | Refills: 2 | DISCHARGE
Start: 2023-09-16

## 2023-09-16 RX ORDER — CARVEDILOL 12.5 MG/1
12.5 TABLET ORAL 2 TIMES DAILY
Status: DISCONTINUED | OUTPATIENT
Start: 2023-09-16 | End: 2023-09-17

## 2023-09-16 RX ADMIN — HEPARIN SODIUM 5000 UNITS: 5000 INJECTION INTRAVENOUS; SUBCUTANEOUS at 06:13

## 2023-09-16 RX ADMIN — LEVETIRACETAM 500 MG: 500 TABLET, FILM COATED ORAL at 21:24

## 2023-09-16 RX ADMIN — SODIUM CHLORIDE, PRESERVATIVE FREE 10 ML: 5 INJECTION INTRAVENOUS at 21:25

## 2023-09-16 RX ADMIN — INSULIN GLARGINE 10 UNITS: 100 INJECTION, SOLUTION SUBCUTANEOUS at 09:07

## 2023-09-16 RX ADMIN — FOLIC ACID 1 MG: 1 TABLET ORAL at 09:07

## 2023-09-16 RX ADMIN — AMLODIPINE BESYLATE 10 MG: 10 TABLET ORAL at 09:07

## 2023-09-16 RX ADMIN — CARVEDILOL 12.5 MG: 12.5 TABLET, FILM COATED ORAL at 09:22

## 2023-09-16 RX ADMIN — INSULIN GLARGINE 5 UNITS: 100 INJECTION, SOLUTION SUBCUTANEOUS at 21:24

## 2023-09-16 RX ADMIN — TIOTROPIUM BROMIDE AND OLODATEROL 2 PUFF: 3.124; 2.736 SPRAY, METERED RESPIRATORY (INHALATION) at 10:18

## 2023-09-16 RX ADMIN — HEPARIN SODIUM 5000 UNITS: 5000 INJECTION INTRAVENOUS; SUBCUTANEOUS at 21:24

## 2023-09-16 RX ADMIN — CLOPIDOGREL BISULFATE 75 MG: 75 TABLET, FILM COATED ORAL at 09:07

## 2023-09-16 RX ADMIN — CARVEDILOL 12.5 MG: 12.5 TABLET, FILM COATED ORAL at 21:23

## 2023-09-16 RX ADMIN — SODIUM CHLORIDE, PRESERVATIVE FREE 10 ML: 5 INJECTION INTRAVENOUS at 09:07

## 2023-09-16 RX ADMIN — LEVETIRACETAM 500 MG: 500 TABLET, FILM COATED ORAL at 09:07

## 2023-09-16 RX ADMIN — ASPIRIN 81 MG 81 MG: 81 TABLET ORAL at 09:07

## 2023-09-16 RX ADMIN — HEPARIN SODIUM 5000 UNITS: 5000 INJECTION INTRAVENOUS; SUBCUTANEOUS at 14:26

## 2023-09-16 ASSESSMENT — PAIN DESCRIPTION - ORIENTATION: ORIENTATION: LEFT

## 2023-09-16 ASSESSMENT — PAIN SCALES - GENERAL: PAINLEVEL_OUTOF10: 3

## 2023-09-16 ASSESSMENT — ENCOUNTER SYMPTOMS
APNEA: 0
WHEEZING: 0
COUGH: 0
SHORTNESS OF BREATH: 0
NAUSEA: 0
STRIDOR: 0
VOMITING: 0

## 2023-09-16 ASSESSMENT — PAIN DESCRIPTION - LOCATION: LOCATION: SHOULDER

## 2023-09-17 LAB
ANION GAP SERPL CALCULATED.3IONS-SCNC: 13 MMOL/L (ref 9–17)
BASOPHILS # BLD: 0.11 K/UL (ref 0–0.2)
BASOPHILS NFR BLD: 1 % (ref 0–2)
BUN SERPL-MCNC: 49 MG/DL (ref 6–20)
CALCIUM SERPL-MCNC: 9.1 MG/DL (ref 8.6–10.4)
CHLORIDE SERPL-SCNC: 105 MMOL/L (ref 98–107)
CO2 SERPL-SCNC: 20 MMOL/L (ref 20–31)
CREAT SERPL-MCNC: 2.7 MG/DL (ref 0.7–1.2)
EOSINOPHIL # BLD: 0.35 K/UL (ref 0–0.44)
EOSINOPHILS RELATIVE PERCENT: 3 % (ref 1–4)
ERYTHROCYTE [DISTWIDTH] IN BLOOD BY AUTOMATED COUNT: 13.8 % (ref 11.8–14.4)
GFR SERPL CREATININE-BSD FRML MDRD: 26 ML/MIN/1.73M2
GLUCOSE BLD-MCNC: 106 MG/DL (ref 75–110)
GLUCOSE BLD-MCNC: 120 MG/DL (ref 75–110)
GLUCOSE BLD-MCNC: 135 MG/DL (ref 75–110)
GLUCOSE BLD-MCNC: 212 MG/DL (ref 75–110)
GLUCOSE BLD-MCNC: 63 MG/DL (ref 75–110)
GLUCOSE BLD-MCNC: 69 MG/DL (ref 75–110)
GLUCOSE SERPL-MCNC: 75 MG/DL (ref 70–99)
HCT VFR BLD AUTO: 29.6 % (ref 40.7–50.3)
HGB BLD-MCNC: 9.1 G/DL (ref 13–17)
IMM GRANULOCYTES # BLD AUTO: 0.04 K/UL (ref 0–0.3)
IMM GRANULOCYTES NFR BLD: 0 %
LYMPHOCYTES NFR BLD: 1.89 K/UL (ref 1.1–3.7)
LYMPHOCYTES RELATIVE PERCENT: 18 % (ref 24–43)
MCH RBC QN AUTO: 31.3 PG (ref 25.2–33.5)
MCHC RBC AUTO-ENTMCNC: 30.7 G/DL (ref 28.4–34.8)
MCV RBC AUTO: 101.7 FL (ref 82.6–102.9)
MONOCYTES NFR BLD: 0.95 K/UL (ref 0.1–1.2)
MONOCYTES NFR BLD: 9 % (ref 3–12)
NEUTROPHILS NFR BLD: 69 % (ref 36–65)
NEUTS SEG NFR BLD: 7.16 K/UL (ref 1.5–8.1)
NRBC BLD-RTO: 0 PER 100 WBC
PLATELET # BLD AUTO: 496 K/UL (ref 138–453)
PMV BLD AUTO: 9.8 FL (ref 8.1–13.5)
POTASSIUM SERPL-SCNC: 4.3 MMOL/L (ref 3.7–5.3)
RBC # BLD AUTO: 2.91 M/UL (ref 4.21–5.77)
SODIUM SERPL-SCNC: 138 MMOL/L (ref 135–144)
WBC OTHER # BLD: 10.5 K/UL (ref 3.5–11.3)

## 2023-09-17 PROCEDURE — 6370000000 HC RX 637 (ALT 250 FOR IP): Performed by: STUDENT IN AN ORGANIZED HEALTH CARE EDUCATION/TRAINING PROGRAM

## 2023-09-17 PROCEDURE — 82947 ASSAY GLUCOSE BLOOD QUANT: CPT

## 2023-09-17 PROCEDURE — 99232 SBSQ HOSP IP/OBS MODERATE 35: CPT | Performed by: INTERNAL MEDICINE

## 2023-09-17 PROCEDURE — 2580000003 HC RX 258: Performed by: STUDENT IN AN ORGANIZED HEALTH CARE EDUCATION/TRAINING PROGRAM

## 2023-09-17 PROCEDURE — 6370000000 HC RX 637 (ALT 250 FOR IP)

## 2023-09-17 PROCEDURE — 97116 GAIT TRAINING THERAPY: CPT

## 2023-09-17 PROCEDURE — 80048 BASIC METABOLIC PNL TOTAL CA: CPT

## 2023-09-17 PROCEDURE — 6360000002 HC RX W HCPCS

## 2023-09-17 PROCEDURE — 1200000000 HC SEMI PRIVATE

## 2023-09-17 PROCEDURE — 94640 AIRWAY INHALATION TREATMENT: CPT

## 2023-09-17 PROCEDURE — 97530 THERAPEUTIC ACTIVITIES: CPT

## 2023-09-17 PROCEDURE — 36415 COLL VENOUS BLD VENIPUNCTURE: CPT

## 2023-09-17 PROCEDURE — 85025 COMPLETE CBC W/AUTO DIFF WBC: CPT

## 2023-09-17 RX ORDER — AMLODIPINE BESYLATE 5 MG/1
5 TABLET ORAL DAILY
Status: DISCONTINUED | OUTPATIENT
Start: 2023-09-17 | End: 2023-09-18 | Stop reason: HOSPADM

## 2023-09-17 RX ORDER — AMLODIPINE BESYLATE 5 MG/1
5 TABLET ORAL DAILY
Status: DISCONTINUED | OUTPATIENT
Start: 2023-09-18 | End: 2023-09-17

## 2023-09-17 RX ORDER — CARVEDILOL 6.25 MG/1
6.25 TABLET ORAL 2 TIMES DAILY
Status: DISCONTINUED | OUTPATIENT
Start: 2023-09-17 | End: 2023-09-18 | Stop reason: HOSPADM

## 2023-09-17 RX ORDER — CARVEDILOL 6.25 MG/1
6.25 TABLET ORAL 2 TIMES DAILY
Status: DISCONTINUED | OUTPATIENT
Start: 2023-09-17 | End: 2023-09-17

## 2023-09-17 RX ADMIN — LEVETIRACETAM 500 MG: 500 TABLET, FILM COATED ORAL at 08:24

## 2023-09-17 RX ADMIN — CARVEDILOL 6.25 MG: 6.25 TABLET, FILM COATED ORAL at 14:05

## 2023-09-17 RX ADMIN — HEPARIN SODIUM 5000 UNITS: 5000 INJECTION INTRAVENOUS; SUBCUTANEOUS at 06:02

## 2023-09-17 RX ADMIN — SODIUM CHLORIDE, PRESERVATIVE FREE 10 ML: 5 INJECTION INTRAVENOUS at 21:22

## 2023-09-17 RX ADMIN — HEPARIN SODIUM 5000 UNITS: 5000 INJECTION INTRAVENOUS; SUBCUTANEOUS at 20:52

## 2023-09-17 RX ADMIN — INSULIN GLARGINE 10 UNITS: 100 INJECTION, SOLUTION SUBCUTANEOUS at 09:28

## 2023-09-17 RX ADMIN — CLOPIDOGREL BISULFATE 75 MG: 75 TABLET, FILM COATED ORAL at 08:24

## 2023-09-17 RX ADMIN — FOLIC ACID 1 MG: 1 TABLET ORAL at 08:24

## 2023-09-17 RX ADMIN — AMLODIPINE BESYLATE 5 MG: 5 TABLET ORAL at 14:11

## 2023-09-17 RX ADMIN — CARVEDILOL 6.25 MG: 6.25 TABLET, FILM COATED ORAL at 20:52

## 2023-09-17 RX ADMIN — LEVETIRACETAM 500 MG: 500 TABLET, FILM COATED ORAL at 20:52

## 2023-09-17 RX ADMIN — HEPARIN SODIUM 5000 UNITS: 5000 INJECTION INTRAVENOUS; SUBCUTANEOUS at 14:04

## 2023-09-17 RX ADMIN — ASPIRIN 81 MG 81 MG: 81 TABLET ORAL at 08:24

## 2023-09-17 RX ADMIN — SODIUM CHLORIDE, PRESERVATIVE FREE 10 ML: 5 INJECTION INTRAVENOUS at 08:24

## 2023-09-17 RX ADMIN — TIOTROPIUM BROMIDE AND OLODATEROL 2 PUFF: 3.124; 2.736 SPRAY, METERED RESPIRATORY (INHALATION) at 09:13

## 2023-09-17 ASSESSMENT — ENCOUNTER SYMPTOMS
CONSTIPATION: 0
WHEEZING: 0
ABDOMINAL PAIN: 0
NAUSEA: 0
VOMITING: 0
SHORTNESS OF BREATH: 0
DIARRHEA: 0

## 2023-09-17 ASSESSMENT — PAIN SCALES - GENERAL: PAINLEVEL_OUTOF10: 8

## 2023-09-18 VITALS
BODY MASS INDEX: 21.16 KG/M2 | SYSTOLIC BLOOD PRESSURE: 161 MMHG | TEMPERATURE: 97.5 F | DIASTOLIC BLOOD PRESSURE: 65 MMHG | RESPIRATION RATE: 17 BRPM | HEIGHT: 65 IN | WEIGHT: 127 LBS | OXYGEN SATURATION: 96 % | HEART RATE: 103 BPM

## 2023-09-18 LAB
ANION GAP SERPL CALCULATED.3IONS-SCNC: 14 MMOL/L (ref 9–17)
BASOPHILS # BLD: 0.11 K/UL (ref 0–0.2)
BASOPHILS NFR BLD: 1 % (ref 0–2)
BUN SERPL-MCNC: 52 MG/DL (ref 6–20)
CALCIUM SERPL-MCNC: 9.2 MG/DL (ref 8.6–10.4)
CHLORIDE SERPL-SCNC: 101 MMOL/L (ref 98–107)
CO2 SERPL-SCNC: 20 MMOL/L (ref 20–31)
CREAT SERPL-MCNC: 2.6 MG/DL (ref 0.7–1.2)
EOSINOPHIL # BLD: 0.33 K/UL (ref 0–0.44)
EOSINOPHILS RELATIVE PERCENT: 4 % (ref 1–4)
ERYTHROCYTE [DISTWIDTH] IN BLOOD BY AUTOMATED COUNT: 13.8 % (ref 11.8–14.4)
GFR SERPL CREATININE-BSD FRML MDRD: 28 ML/MIN/1.73M2
GLUCOSE BLD-MCNC: 123 MG/DL (ref 75–110)
GLUCOSE BLD-MCNC: 129 MG/DL (ref 75–110)
GLUCOSE BLD-MCNC: 228 MG/DL (ref 75–110)
GLUCOSE BLD-MCNC: 88 MG/DL (ref 75–110)
GLUCOSE BLD-MCNC: 95 MG/DL (ref 75–110)
GLUCOSE SERPL-MCNC: 80 MG/DL (ref 70–99)
HCT VFR BLD AUTO: 28.6 % (ref 40.7–50.3)
HGB BLD-MCNC: 9.1 G/DL (ref 13–17)
IMM GRANULOCYTES # BLD AUTO: 0.03 K/UL (ref 0–0.3)
IMM GRANULOCYTES NFR BLD: 0 %
LYMPHOCYTES NFR BLD: 1.6 K/UL (ref 1.1–3.7)
LYMPHOCYTES RELATIVE PERCENT: 17 % (ref 24–43)
MCH RBC QN AUTO: 31.4 PG (ref 25.2–33.5)
MCHC RBC AUTO-ENTMCNC: 31.8 G/DL (ref 28.4–34.8)
MCV RBC AUTO: 98.6 FL (ref 82.6–102.9)
MONOCYTES NFR BLD: 0.8 K/UL (ref 0.1–1.2)
MONOCYTES NFR BLD: 9 % (ref 3–12)
NEUTROPHILS NFR BLD: 69 % (ref 36–65)
NEUTS SEG NFR BLD: 6.53 K/UL (ref 1.5–8.1)
NRBC BLD-RTO: 0 PER 100 WBC
PLATELET # BLD AUTO: 515 K/UL (ref 138–453)
PMV BLD AUTO: 9.6 FL (ref 8.1–13.5)
POTASSIUM SERPL-SCNC: 4.2 MMOL/L (ref 3.7–5.3)
RBC # BLD AUTO: 2.9 M/UL (ref 4.21–5.77)
SODIUM SERPL-SCNC: 135 MMOL/L (ref 135–144)
WBC OTHER # BLD: 9.4 K/UL (ref 3.5–11.3)

## 2023-09-18 PROCEDURE — 6360000002 HC RX W HCPCS

## 2023-09-18 PROCEDURE — 99231 SBSQ HOSP IP/OBS SF/LOW 25: CPT | Performed by: INTERNAL MEDICINE

## 2023-09-18 PROCEDURE — 36415 COLL VENOUS BLD VENIPUNCTURE: CPT

## 2023-09-18 PROCEDURE — 94761 N-INVAS EAR/PLS OXIMETRY MLT: CPT

## 2023-09-18 PROCEDURE — 80048 BASIC METABOLIC PNL TOTAL CA: CPT

## 2023-09-18 PROCEDURE — 6370000000 HC RX 637 (ALT 250 FOR IP)

## 2023-09-18 PROCEDURE — 82947 ASSAY GLUCOSE BLOOD QUANT: CPT

## 2023-09-18 PROCEDURE — 85025 COMPLETE CBC W/AUTO DIFF WBC: CPT

## 2023-09-18 PROCEDURE — 97530 THERAPEUTIC ACTIVITIES: CPT

## 2023-09-18 PROCEDURE — 6370000000 HC RX 637 (ALT 250 FOR IP): Performed by: STUDENT IN AN ORGANIZED HEALTH CARE EDUCATION/TRAINING PROGRAM

## 2023-09-18 PROCEDURE — 97535 SELF CARE MNGMENT TRAINING: CPT

## 2023-09-18 PROCEDURE — 94640 AIRWAY INHALATION TREATMENT: CPT

## 2023-09-18 PROCEDURE — 2580000003 HC RX 258: Performed by: STUDENT IN AN ORGANIZED HEALTH CARE EDUCATION/TRAINING PROGRAM

## 2023-09-18 RX ADMIN — FOLIC ACID 1 MG: 1 TABLET ORAL at 09:05

## 2023-09-18 RX ADMIN — LEVETIRACETAM 500 MG: 500 TABLET, FILM COATED ORAL at 19:56

## 2023-09-18 RX ADMIN — AMLODIPINE BESYLATE 5 MG: 5 TABLET ORAL at 09:05

## 2023-09-18 RX ADMIN — HEPARIN SODIUM 5000 UNITS: 5000 INJECTION INTRAVENOUS; SUBCUTANEOUS at 05:47

## 2023-09-18 RX ADMIN — ASPIRIN 81 MG 81 MG: 81 TABLET ORAL at 09:05

## 2023-09-18 RX ADMIN — LEVETIRACETAM 500 MG: 500 TABLET, FILM COATED ORAL at 09:05

## 2023-09-18 RX ADMIN — HEPARIN SODIUM 5000 UNITS: 5000 INJECTION INTRAVENOUS; SUBCUTANEOUS at 15:30

## 2023-09-18 RX ADMIN — HEPARIN SODIUM 5000 UNITS: 5000 INJECTION INTRAVENOUS; SUBCUTANEOUS at 19:56

## 2023-09-18 RX ADMIN — CARVEDILOL 6.25 MG: 6.25 TABLET, FILM COATED ORAL at 09:05

## 2023-09-18 RX ADMIN — CLOPIDOGREL BISULFATE 75 MG: 75 TABLET, FILM COATED ORAL at 09:05

## 2023-09-18 RX ADMIN — INSULIN GLARGINE 10 UNITS: 100 INJECTION, SOLUTION SUBCUTANEOUS at 09:04

## 2023-09-18 RX ADMIN — TIOTROPIUM BROMIDE AND OLODATEROL 2 PUFF: 3.124; 2.736 SPRAY, METERED RESPIRATORY (INHALATION) at 07:55

## 2023-09-18 RX ADMIN — SODIUM CHLORIDE, PRESERVATIVE FREE 10 ML: 5 INJECTION INTRAVENOUS at 09:05

## 2023-09-18 RX ADMIN — CARVEDILOL 6.25 MG: 6.25 TABLET, FILM COATED ORAL at 19:57

## 2023-09-18 ASSESSMENT — ENCOUNTER SYMPTOMS
VOMITING: 0
DIARRHEA: 0
SHORTNESS OF BREATH: 0
NAUSEA: 0
CONSTIPATION: 0
ABDOMINAL PAIN: 0
WHEEZING: 0

## 2023-09-18 NOTE — PLAN OF CARE
Patient in no apparent distress at this time. No falls or new injuries noted. Will continue to monitor.
Patient in no apparent distress at this time. No falls or new injuries noted. Will continue to monitor.
Problem: Chronic Conditions and Co-morbidities  Goal: Patient's chronic conditions and co-morbidity symptoms are monitored and maintained or improved  Outcome: Completed     Problem: Respiratory - Adult  Goal: Achieves optimal ventilation and oxygenation  Outcome: Completed
Problem: Chronic Conditions and Co-morbidities  Goal: Patient's chronic conditions and co-morbidity symptoms are monitored and maintained or improved  Outcome: Progressing
Problem: Chronic Conditions and Co-morbidities  Goal: Patient's chronic conditions and co-morbidity symptoms are monitored and maintained or improved  Outcome: Progressing     Problem: Respiratory - Adult  Goal: Achieves optimal ventilation and oxygenation  9/17/2023 1518 by Jas Guillen RN  Outcome: Progressing  9/17/2023 0408 by Argenis Amador RN  Outcome: Progressing  9/17/2023 0408 by Argenis Amador RN  Outcome: Progressing
Problem: Discharge Planning  Goal: Discharge to home or other facility with appropriate resources  9/10/2023 0446 by Shashi Ortega RN  Outcome: Progressing  9/9/2023 1814 by Ema Vigil RN  Outcome: Progressing     Problem: Safety - Adult  Goal: Free from fall injury  9/10/2023 0446 by Shashi Ortega RN  Outcome: Progressing  9/9/2023 1814 by Ema Vigil RN  Outcome: Progressing     Problem: Skin/Tissue Integrity  Goal: Absence of new skin breakdown  Description: 1. Monitor for areas of redness and/or skin breakdown  2. Assess vascular access sites hourly  3. Every 4-6 hours minimum:  Change oxygen saturation probe site  4. Every 4-6 hours:  If on nasal continuous positive airway pressure, respiratory therapy assess nares and determine need for appliance change or resting period.   9/10/2023 0446 by Shashi Ortega RN  Outcome: Progressing  9/9/2023 1814 by Ema Vigil RN  Outcome: Progressing     Problem: Chronic Conditions and Co-morbidities  Goal: Patient's chronic conditions and co-morbidity symptoms are monitored and maintained or improved  9/10/2023 0446 by Shashi Ortega RN  Outcome: Progressing  9/9/2023 1814 by Ema Vigil RN  Outcome: Progressing
Problem: Discharge Planning  Goal: Discharge to home or other facility with appropriate resources  9/10/2023 1508 by Radha Powell RN  Outcome: Progressing  9/10/2023 0446 by Mony Mancera RN  Outcome: Progressing     Problem: Safety - Adult  Goal: Free from fall injury  9/10/2023 1508 by Radha Powell RN  Outcome: Progressing  9/10/2023 0446 by Mony Mancera RN  Outcome: Progressing     Problem: Skin/Tissue Integrity  Goal: Absence of new skin breakdown  Description: 1. Monitor for areas of redness and/or skin breakdown  2. Assess vascular access sites hourly  3. Every 4-6 hours minimum:  Change oxygen saturation probe site  4. Every 4-6 hours:  If on nasal continuous positive airway pressure, respiratory therapy assess nares and determine need for appliance change or resting period.   9/10/2023 1508 by Radha Powell RN  Outcome: Progressing  9/10/2023 0446 by Mony Mancera RN  Outcome: Progressing     Problem: Cardiovascular - Adult  Goal: Maintains optimal cardiac output and hemodynamic stability  Outcome: Progressing     Problem: Musculoskeletal - Adult  Goal: Return mobility to safest level of function  Outcome: Progressing  Goal: Return ADL status to a safe level of function  Outcome: Progressing     Problem: Genitourinary - Adult  Goal: Absence of urinary retention  Outcome: Progressing  Goal: Urinary catheter remains patent  Outcome: Progressing     Problem: Infection - Adult  Goal: Absence of infection at discharge  Outcome: Progressing  Goal: Absence of infection during hospitalization  Outcome: Progressing  Goal: Absence of fever/infection during anticipated neutropenic period  Outcome: Progressing     Problem: Metabolic/Fluid and Electrolytes - Adult  Goal: Electrolytes maintained within normal limits  Outcome: Progressing  Goal: Hemodynamic stability and optimal renal function maintained  Outcome: Progressing  Goal: Glucose maintained within prescribed range  Outcome: Progressing
Problem: Discharge Planning  Goal: Discharge to home or other facility with appropriate resources  9/12/2023 1459 by Pola Mcdonald RN  Outcome: Progressing  9/12/2023 0239 by Veronika Henry RN  Outcome: Progressing     Problem: Safety - Adult  Goal: Free from fall injury  9/12/2023 1459 by Pola Mcdonald RN  Outcome: Progressing  9/12/2023 0239 by Veronika Henry RN  Outcome: Progressing     Problem: Skin/Tissue Integrity  Goal: Absence of new skin breakdown  Description: 1. Monitor for areas of redness and/or skin breakdown  2. Assess vascular access sites hourly  3. Every 4-6 hours minimum:  Change oxygen saturation probe site  4. Every 4-6 hours:  If on nasal continuous positive airway pressure, respiratory therapy assess nares and determine need for appliance change or resting period.   9/12/2023 1459 by Pola Mcdonald RN  Outcome: Progressing  9/12/2023 0239 by Veronika Henry RN  Outcome: Progressing     Problem: Cardiovascular - Adult  Goal: Maintains optimal cardiac output and hemodynamic stability  9/12/2023 1459 by Pola Mcdonald RN  Outcome: Progressing  9/12/2023 0239 by Veronika Henry RN  Outcome: Progressing     Problem: Musculoskeletal - Adult  Goal: Return mobility to safest level of function  9/12/2023 1459 by Pola Mcdonald RN  Outcome: Progressing  9/12/2023 0239 by Veronika Henry RN  Outcome: Progressing  Goal: Return ADL status to a safe level of function  9/12/2023 1459 by Pola Mcdonald RN  Outcome: Progressing  9/12/2023 0239 by Veronika Henry RN  Outcome: Progressing     Problem: Genitourinary - Adult  Goal: Absence of urinary retention  9/12/2023 1459 by Pola Mcdonald RN  Outcome: Progressing  9/12/2023 0239 by Veronika Henry RN  Outcome: Progressing  Goal: Urinary catheter remains patent  9/12/2023 1459 by Pola Mcdonald RN  Outcome: Progressing  9/12/2023 0239 by Veronika Henry RN  Outcome: Progressing     Problem: Infection -
Problem: Discharge Planning  Goal: Discharge to home or other facility with appropriate resources  9/13/2023 2332 by Susannah Heaton RN  Outcome: Progressing  Note: Plan to dc to rehab  9/13/2023 1612 by Yajaira Sunshine RN  Outcome: Progressing  9/13/2023 1610 by Yajaira Sunshine RN  Outcome: Progressing  Flowsheets (Taken 9/13/2023 0800)  Discharge to home or other facility with appropriate resources: Identify barriers to discharge with patient and caregiver     Problem: Safety - Adult  Goal: Free from fall injury  9/13/2023 2332 by Susannah Heaton RN  Outcome: Progressing  Note: Patient free from injury. Safety precautions in place. Will continue to monitor   9/13/2023 1612 by Yajaira Sunshine RN  Outcome: Progressing  9/13/2023 1610 by Yajaira Sunshine RN  Outcome: Progressing  Flowsheets (Taken 9/13/2023 0726)  Free From Fall Injury: Instruct family/caregiver on patient safety     Problem: Skin/Tissue Integrity  Goal: Absence of new skin breakdown  Description: 1. Monitor for areas of redness and/or skin breakdown  2. Assess vascular access sites hourly  3. Every 4-6 hours minimum:  Change oxygen saturation probe site  4. Every 4-6 hours:  If on nasal continuous positive airway pressure, respiratory therapy assess nares and determine need for appliance change or resting period. 9/13/2023 2332 by Susannah Heaton RN  Outcome: Progressing  Note: Skin intact. Patient repositions self. Encourage ambulation. Will cont to monitor   9/13/2023 1612 by Yajaira Sunshine RN  Outcome: Progressing  9/13/2023 1610 by Yajaira Sunshine RN  Outcome: Progressing     Problem: Cardiovascular - Adult  Goal: Maintains optimal cardiac output and hemodynamic stability  9/13/2023 2332 by Susannah Heaton RN  Outcome: Progressing  Note: Cardiac output maintained. Tele maintained.  Will cont to monitor  9/13/2023 1612 by Yajaira Sunshine RN  Outcome: Progressing  9/13/2023 1610 by Yajaira Sunshine RN  Outcome: Progressing     Problem: Musculoskeletal -
Problem: Discharge Planning  Goal: Discharge to home or other facility with appropriate resources  9/6/2023 1243 by Antonia Medina RN  Outcome: Progressing     Problem: Safety - Adult  Goal: Free from fall injury  9/6/2023 1243 by Antonia Medina RN  Outcome: Progressing     Problem: Skin/Tissue Integrity  Goal: Absence of new skin breakdown  Description: 1. Monitor for areas of redness and/or skin breakdown  2. Assess vascular access sites hourly  3. Every 4-6 hours minimum:  Change oxygen saturation probe site  4. Every 4-6 hours:  If on nasal continuous positive airway pressure, respiratory therapy assess nares and determine need for appliance change or resting period.   9/6/2023 1243 by Antonia Medina RN  Outcome: Progressing     Problem: Cardiovascular - Adult  Goal: Maintains optimal cardiac output and hemodynamic stability  Outcome: Progressing     Problem: Musculoskeletal - Adult  Goal: Return mobility to safest level of function  Outcome: Progressing     Problem: Musculoskeletal - Adult  Goal: Return ADL status to a safe level of function  Outcome: Progressing     Problem: Genitourinary - Adult  Goal: Absence of urinary retention  Outcome: Progressing     Problem: Genitourinary - Adult  Goal: Urinary catheter remains patent  Outcome: Progressing     Problem: Infection - Adult  Goal: Absence of infection at discharge  Outcome: Progressing     Problem: Infection - Adult  Goal: Absence of infection during hospitalization  Outcome: Progressing     Problem: Infection - Adult  Goal: Absence of fever/infection during anticipated neutropenic period  Outcome: Progressing     Problem: Metabolic/Fluid and Electrolytes - Adult  Goal: Electrolytes maintained within normal limits  Outcome: Progressing     Problem: Metabolic/Fluid and Electrolytes - Adult  Goal: Hemodynamic stability and optimal renal function maintained  Outcome: Progressing     Problem: Metabolic/Fluid and Electrolytes - Adult  Goal: Glucose
Problem: Discharge Planning  Goal: Discharge to home or other facility with appropriate resources  9/7/2023 0801 by Mert Chau RN  Outcome: Progressing     Problem: Safety - Adult  Goal: Free from fall injury  9/7/2023 0801 by Mert Chau RN  Outcome: Progressing     Problem: Skin/Tissue Integrity  Goal: Absence of new skin breakdown  Description: 1. Monitor for areas of redness and/or skin breakdown  2. Assess vascular access sites hourly  3. Every 4-6 hours minimum:  Change oxygen saturation probe site  4. Every 4-6 hours:  If on nasal continuous positive airway pressure, respiratory therapy assess nares and determine need for appliance change or resting period.   9/7/2023 0801 by Mert Chau RN  Outcome: Progressing     Problem: Cardiovascular - Adult  Goal: Maintains optimal cardiac output and hemodynamic stability  9/7/2023 0801 by Mert Chau RN  Outcome: Progressing     Problem: Musculoskeletal - Adult  Goal: Return mobility to safest level of function  9/7/2023 0801 by Mert Chau RN  Outcome: Progressing     Problem: Musculoskeletal - Adult  Goal: Return ADL status to a safe level of function  Outcome: Progressing     Problem: Genitourinary - Adult  Goal: Absence of urinary retention  9/7/2023 0801 by Mert Chau RN  Outcome: Progressing     Problem: Genitourinary - Adult  Goal: Urinary catheter remains patent  Outcome: Progressing     Problem: Infection - Adult  Goal: Absence of infection at discharge  9/7/2023 0801 by Mert Chau RN  Outcome: Progressing     Problem: Infection - Adult  Goal: Absence of infection during hospitalization  Outcome: Progressing     Problem: Infection - Adult  Goal: Absence of fever/infection during anticipated neutropenic period  Outcome: Progressing     Problem: Metabolic/Fluid and Electrolytes - Adult  Goal: Electrolytes maintained within normal limits  9/7/2023 0801 by Mert Chau
Problem: Discharge Planning  Goal: Discharge to home or other facility with appropriate resources  9/7/2023 2049 by Siobhan Shipley RN  Outcome: Progressing  9/7/2023 0801 by Francesca Leon RN  Outcome: Progressing  Flowsheets (Taken 9/7/2023 0800)  Discharge to home or other facility with appropriate resources:   Identify barriers to discharge with patient and caregiver   Arrange for needed discharge resources and transportation as appropriate   Identify discharge learning needs (meds, wound care, etc)   Arrange for interpreters to assist at discharge as needed   Refer to discharge planning if patient needs post-hospital services based on physician order or complex needs related to functional status, cognitive ability or social support system     Problem: Safety - Adult  Goal: Free from fall injury  9/7/2023 2049 by Siobhan Shipley RN  Outcome: Progressing  9/7/2023 0801 by Francesca Leon RN  Outcome: Progressing  Flowsheets (Taken 9/7/2023 0800)  Free From Fall Injury: Instruct family/caregiver on patient safety     Problem: Skin/Tissue Integrity  Goal: Absence of new skin breakdown  Description: 1. Monitor for areas of redness and/or skin breakdown  2. Assess vascular access sites hourly  3. Every 4-6 hours minimum:  Change oxygen saturation probe site  4. Every 4-6 hours:  If on nasal continuous positive airway pressure, respiratory therapy assess nares and determine need for appliance change or resting period.   9/7/2023 2049 by Siobhan Shipley RN  Outcome: Progressing  9/7/2023 0801 by Francesca Leon RN  Outcome: Progressing     Problem: Cardiovascular - Adult  Goal: Maintains optimal cardiac output and hemodynamic stability  9/7/2023 2049 by Siobhan Shipley RN  Outcome: Progressing  9/7/2023 0801 by Francesca Leon RN  Outcome: Progressing     Problem: Musculoskeletal - Adult  Goal: Return mobility to safest level of function  9/7/2023 2049 by Mckay rosa
Problem: Discharge Planning  Goal: Discharge to home or other facility with appropriate resources  9/8/2023 0954 by Mihai Soto RN  Outcome: Progressing  Flowsheets (Taken 9/8/2023 0800)  Discharge to home or other facility with appropriate resources: Identify barriers to discharge with patient and caregiver     Problem: Safety - Adult  Goal: Free from fall injury  9/8/2023 0954 by Mihai Soto RN  Outcome: Progressing     Problem: Skin/Tissue Integrity  Goal: Absence of new skin breakdown  9/8/2023 0954 by Mihai Soto RN  Outcome: Progressing     Problem: Cardiovascular - Adult  Goal: Maintains optimal cardiac output and hemodynamic stability  9/8/2023 0954 by Mihai Soto RN  Outcome: Progressing     Problem: Musculoskeletal - Adult  Goal: Return mobility to safest level of function  9/8/2023 0954 by Mihai Soto RN  Outcome: Progressing     Problem: Musculoskeletal - Adult  Goal: Return ADL status to a safe level of function  9/8/2023 0954 by Mihai Soto RN  Outcome: Progressing     Problem: Genitourinary - Adult  Goal: Absence of urinary retention  9/8/2023 0954 by Mihai Soto RN  Outcome: Progressing     Problem: Genitourinary - Adult  Goal: Urinary catheter remains patent  Outcome: Progressing     Problem: Genitourinary - Adult  Goal: Absence of urinary retention  9/8/2023 0954 by Mihai Soto RN  Outcome: Progressing     Problem: Infection - Adult  Goal: Absence of infection at discharge  9/8/2023 0954 by Mihai Soto RN  Outcome: Progressing     Problem: Infection - Adult  Goal: Absence of infection during hospitalization  9/8/2023 0954 by Mihai Soto RN  Outcome: Progressing     Problem: Infection - Adult  Goal: Absence of fever/infection during anticipated neutropenic period  9/8/2023 0954 by Mihai Soto RN  Outcome: Progressing     Problem: Metabolic/Fluid and Electrolytes - Adult  Goal: Electrolytes maintained within normal limits  Outcome:
Problem: Discharge Planning  Goal: Discharge to home or other facility with appropriate resources  9/8/2023 1656 by Marlin Mcdonald RN  Outcome: Progressing  Flowsheets (Taken 9/8/2023 1544)  Discharge to home or other facility with appropriate resources:   Identify barriers to discharge with patient and caregiver   Arrange for needed discharge resources and transportation as appropriate   Identify discharge learning needs (meds, wound care, etc)   Refer to discharge planning if patient needs post-hospital services based on physician order or complex needs related to functional status, cognitive ability or social support system  9/8/2023 0954 by Christine Kowalski RN  Outcome: Progressing  Flowsheets (Taken 9/8/2023 0800)  Discharge to home or other facility with appropriate resources: Identify barriers to discharge with patient and caregiver     Problem: Safety - Adult  Goal: Free from fall injury  9/8/2023 1656 by Marlin Mcdonald RN  Outcome: Progressing  9/8/2023 0954 by Christine Kowalski RN  Outcome: Progressing     Problem: Skin/Tissue Integrity  Goal: Absence of new skin breakdown  Description: 1. Monitor for areas of redness and/or skin breakdown  2. Assess vascular access sites hourly  3. Every 4-6 hours minimum:  Change oxygen saturation probe site  4. Every 4-6 hours:  If on nasal continuous positive airway pressure, respiratory therapy assess nares and determine need for appliance change or resting period.   9/8/2023 1656 by Marlin Mcdonald RN  Outcome: Progressing  9/8/2023 0954 by Christine Kowalski RN  Outcome: Progressing     Problem: Cardiovascular - Adult  Goal: Maintains optimal cardiac output and hemodynamic stability  9/8/2023 1656 by Marlin Mcdonald RN  Outcome: Progressing  Flowsheets (Taken 9/8/2023 1544)  Maintains optimal cardiac output and hemodynamic stability:   Monitor blood pressure and heart rate   Monitor urine output and notify Licensed Independent Practitioner for values outside of
Problem: Discharge Planning  Goal: Discharge to home or other facility with appropriate resources  Outcome: Completed  Flowsheets (Taken 9/14/2023 0800)  Discharge to home or other facility with appropriate resources: Identify barriers to discharge with patient and caregiver     Problem: Safety - Adult  Goal: Free from fall injury  Outcome: Completed  Flowsheets (Taken 9/14/2023 0720)  Free From Fall Injury: Instruct family/caregiver on patient safety     Problem: Skin/Tissue Integrity  Goal: Absence of new skin breakdown  Description: 1. Monitor for areas of redness and/or skin breakdown  2. Assess vascular access sites hourly  3. Every 4-6 hours minimum:  Change oxygen saturation probe site  4. Every 4-6 hours:  If on nasal continuous positive airway pressure, respiratory therapy assess nares and determine need for appliance change or resting period.   Outcome: Completed     Problem: Cardiovascular - Adult  Goal: Maintains optimal cardiac output and hemodynamic stability  Outcome: Completed     Problem: Musculoskeletal - Adult  Goal: Return mobility to safest level of function  Outcome: Completed  Flowsheets (Taken 9/14/2023 0800)  Return Mobility to Safest Level of Function: Assess patient stability and activity tolerance for standing, transferring and ambulating with or without assistive devices  Goal: Return ADL status to a safe level of function  Outcome: Completed     Problem: Genitourinary - Adult  Goal: Absence of urinary retention  Outcome: Completed     Problem: Infection - Adult  Goal: Absence of infection at discharge  Outcome: Completed  Flowsheets (Taken 9/14/2023 0800)  Absence of infection at discharge: Assess and monitor for signs and symptoms of infection  Goal: Absence of infection during hospitalization  Outcome: Completed  Goal: Absence of fever/infection during anticipated neutropenic period  Outcome: Completed     Problem: Metabolic/Fluid and Electrolytes - Adult  Goal: Electrolytes
Problem: Discharge Planning  Goal: Discharge to home or other facility with appropriate resources  Outcome: Progressing     Problem: Safety - Adult  Goal: Free from fall injury  9/11/2023 1436 by Suleman Flores RN  Outcome: Progressing  9/11/2023 0412 by Frankey Revere, RN  Outcome: Progressing     Problem: Skin/Tissue Integrity  Goal: Absence of new skin breakdown  Description: 1. Monitor for areas of redness and/or skin breakdown  2. Assess vascular access sites hourly  3. Every 4-6 hours minimum:  Change oxygen saturation probe site  4. Every 4-6 hours:  If on nasal continuous positive airway pressure, respiratory therapy assess nares and determine need for appliance change or resting period.   Outcome: Progressing     Problem: Cardiovascular - Adult  Goal: Maintains optimal cardiac output and hemodynamic stability  Outcome: Progressing     Problem: Musculoskeletal - Adult  Goal: Return mobility to safest level of function  Outcome: Progressing  Goal: Return ADL status to a safe level of function  Outcome: Progressing     Problem: Genitourinary - Adult  Goal: Absence of urinary retention  Outcome: Progressing  Goal: Urinary catheter remains patent  Outcome: Progressing     Problem: Infection - Adult  Goal: Absence of infection at discharge  Outcome: Progressing  Goal: Absence of infection during hospitalization  Outcome: Progressing  Goal: Absence of fever/infection during anticipated neutropenic period  Outcome: Progressing     Problem: Metabolic/Fluid and Electrolytes - Adult  Goal: Electrolytes maintained within normal limits  Outcome: Progressing  Goal: Hemodynamic stability and optimal renal function maintained  Outcome: Progressing  Goal: Glucose maintained within prescribed range  Outcome: Progressing     Problem: Chronic Conditions and Co-morbidities  Goal: Patient's chronic conditions and co-morbidity symptoms are monitored and maintained or improved  Outcome: Progressing     Problem: Nutrition
Problem: Discharge Planning  Goal: Discharge to home or other facility with appropriate resources  Outcome: Progressing     Problem: Safety - Adult  Goal: Free from fall injury  Outcome: Progressing     Problem: Skin/Tissue Integrity  Goal: Absence of new skin breakdown  Description: 1. Monitor for areas of redness and/or skin breakdown  2. Assess vascular access sites hourly  3. Every 4-6 hours minimum:  Change oxygen saturation probe site  4. Every 4-6 hours:  If on nasal continuous positive airway pressure, respiratory therapy assess nares and determine need for appliance change or resting period.   Outcome: Progressing
Problem: Discharge Planning  Goal: Discharge to home or other facility with appropriate resources  Outcome: Progressing     Problem: Safety - Adult  Goal: Free from fall injury  Outcome: Progressing     Problem: Skin/Tissue Integrity  Goal: Absence of new skin breakdown  Description: 1. Monitor for areas of redness and/or skin breakdown  2. Assess vascular access sites hourly  3. Every 4-6 hours minimum:  Change oxygen saturation probe site  4. Every 4-6 hours:  If on nasal continuous positive airway pressure, respiratory therapy assess nares and determine need for appliance change or resting period.   Outcome: Progressing     Problem: Cardiovascular - Adult  Goal: Maintains optimal cardiac output and hemodynamic stability  Outcome: Progressing     Problem: Musculoskeletal - Adult  Goal: Return mobility to safest level of function  Outcome: Progressing     Problem: Genitourinary - Adult  Goal: Absence of urinary retention  Outcome: Progressing     Problem: Infection - Adult  Goal: Absence of infection at discharge  Outcome: Progressing     Problem: Metabolic/Fluid and Electrolytes - Adult  Goal: Electrolytes maintained within normal limits  Outcome: Progressing     Problem: Chronic Conditions and Co-morbidities  Goal: Patient's chronic conditions and co-morbidity symptoms are monitored and maintained or improved  Outcome: Progressing     Problem: Nutrition Deficit:  Goal: Optimize nutritional status  Outcome: Progressing     Problem: ABCDS Injury Assessment  Goal: Absence of physical injury  Outcome: Progressing
Problem: Discharge Planning  Goal: Discharge to home or other facility with appropriate resources  Outcome: Progressing  Flowsheets (Taken 9/13/2023 0800)  Discharge to home or other facility with appropriate resources: Identify barriers to discharge with patient and caregiver     Problem: Safety - Adult  Goal: Free from fall injury  Outcome: Progressing  Flowsheets (Taken 9/13/2023 0726)  Free From Fall Injury: Instruct family/caregiver on patient safety     Problem: Skin/Tissue Integrity  Goal: Absence of new skin breakdown  Description: 1. Monitor for areas of redness and/or skin breakdown  2. Assess vascular access sites hourly  3. Every 4-6 hours minimum:  Change oxygen saturation probe site  4. Every 4-6 hours:  If on nasal continuous positive airway pressure, respiratory therapy assess nares and determine need for appliance change or resting period.   Outcome: Progressing     Problem: Cardiovascular - Adult  Goal: Maintains optimal cardiac output and hemodynamic stability  Outcome: Progressing     Problem: Musculoskeletal - Adult  Goal: Return mobility to safest level of function  Outcome: Progressing  Flowsheets (Taken 9/13/2023 0800)  Return Mobility to Safest Level of Function: Assess patient stability and activity tolerance for standing, transferring and ambulating with or without assistive devices  Goal: Return ADL status to a safe level of function  Outcome: Progressing     Problem: Genitourinary - Adult  Goal: Absence of urinary retention  Outcome: Progressing  Goal: Urinary catheter remains patent  Outcome: Progressing     Problem: Infection - Adult  Goal: Absence of infection at discharge  Outcome: Progressing  Goal: Absence of infection during hospitalization  Outcome: Progressing  Goal: Absence of fever/infection during anticipated neutropenic period  Outcome: Progressing     Problem: Metabolic/Fluid and Electrolytes - Adult  Goal: Electrolytes maintained within normal limits  Outcome:
Problem: Respiratory - Adult  Goal: Achieves optimal ventilation and oxygenation  9/17/2023 0408 by Uriel Campos RN  Outcome: Progressing
Problem: Respiratory - Adult  Goal: Achieves optimal ventilation and oxygenation  9/18/2023 0236 by Jefferson Nuñez RN  Outcome: Progressing
Problem: Respiratory - Adult  Goal: Achieves optimal ventilation and oxygenation  Outcome: Progressing   BRONCHOSPASM/BRONCHOCONSTRICTION     [x]         IMPROVE AERATION/BREATH SOUNDS  [x]   ADMINISTER BRONCHODILATOR THERAPY AS APPROPRIATE  [x]   ASSESS BREATH SOUNDS  []   IMPLEMENT AEROSOL/MDI PROTOCOL  [x]   PATIENT EDUCATION AS NEEDED
Problem: Respiratory - Adult  Goal: Achieves optimal ventilation and oxygenation  Outcome: Progressing  Flowsheets (Taken 9/16/2023 1027)  Achieves optimal ventilation and oxygenation:   Assess for changes in respiratory status   Respiratory therapy support as indicated   Assess for changes in mentation and behavior   Encourage broncho-pulmonary hygiene including cough, deep breathe, incentive spirometry   Assess and instruct to report shortness of breath or any respiratory difficulty
Problem: Safety - Adult  Goal: Free from fall injury  9/11/2023 0412 by Juan R Kirk RN  Outcome: Progressing
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 History of left ventricle apical thrombus completed 6 months of Coumadin therapy. Completed 5 sessions of radiation therapy about 3 weeks ago. Patient was admitted due to weakness and a fall. Patient denied loss of consciousness states that for few weeks. Also reported decrease in appetite and low po intake, when he started treatment for cancer. Patient was hypotensive 60s abd received 2L bolus of normal saline in the ED and received Levophed and elevated Metabolic acidosis, with elevated CK levels. Creatinine levels 7.4  Glucose glucose initially in the 30s and low hemoglobin of 8 baseline around 13-15, leukocytosis 19  Patient was admitted in the ICU and received Levophed, also received 1 unit of blood    Cardiology was consulted elevated troponin 232-196. EKG showed NSR, sinus tachycardia and no acute ST-T wave changes. Likely Type II NSTEMI and no concern for ischemia, recommended to hold heart failure medications due to hypotension. Resume ASA, Plavix and statin and get limited Echo. Previous echo from June 2023 shows left ventricular apical thrombus. Global hypokinesis, EF 35 to 40%. Nephrology was consulted for elevated Creatinine levels, acute ALMITA  and placed temporary dialysis catheter by IR, received 2 dialysis sessions 9/6 and 9/7    Neurology was consulted due to motor weakness in left hand.  Previously admitted in 2023 for AMS and MRI brain showed small subacute to acute infarct of right temporal periventricular white matter,
discharge  9/9/2023 1814 by Mónica Richardson RN  Outcome: Progressing  9/9/2023 0609 by Payton Viramontes RN  Outcome: Progressing  Goal: Absence of infection during hospitalization  9/9/2023 1814 by Mónica Richardson RN  Outcome: Progressing  9/9/2023 0609 by Payton Viramontes RN  Outcome: Progressing  Goal: Absence of fever/infection during anticipated neutropenic period  9/9/2023 1814 by Mónica Richardson RN  Outcome: Progressing  9/9/2023 0609 by Payton Viramontes RN  Outcome: Progressing     Problem: Metabolic/Fluid and Electrolytes - Adult  Goal: Electrolytes maintained within normal limits  9/9/2023 1814 by Mónica Richardson RN  Outcome: Progressing  9/9/2023 0609 by Payton Viramontes RN  Outcome: Progressing  Goal: Hemodynamic stability and optimal renal function maintained  9/9/2023 1814 by Mónica Richardson RN  Outcome: Progressing  9/9/2023 0609 by Payton Viramontes RN  Outcome: Progressing  Goal: Glucose maintained within prescribed range  9/9/2023 1814 by Mónica Richardson RN  Outcome: Progressing  9/9/2023 0609 by Payton Viramontes RN  Outcome: Progressing     Problem: Chronic Conditions and Co-morbidities  Goal: Patient's chronic conditions and co-morbidity symptoms are monitored and maintained or improved  9/9/2023 1814 by Mónica Richardson RN  Outcome: Progressing  9/9/2023 0609 by Payton Viramontes RN  Outcome: Progressing     Problem: Nutrition Deficit:  Goal: Optimize nutritional status  9/9/2023 1814 by Mónica Richardson RN  Outcome: Progressing  9/9/2023 0609 by Payton Viramontes RN  Outcome: Progressing     Problem: ABCDS Injury Assessment  Goal: Absence of physical injury  9/9/2023 1814 by Mónica Richardson RN  Outcome: Progressing  9/9/2023 0609 by Payton Viramontes RN  Outcome: Progressing     Problem: Respiratory - Adult  Goal: Achieves optimal ventilation and oxygenation  9/9/2023 1814 by Mónica Richardson RN  Outcome: Progressing  9/9/2023 0609 by Payton Viramontes RN  Outcome: Progressing     Problem:
RN  Outcome: Progressing     Problem: ABCDS Injury Assessment  Goal: Absence of physical injury  9/13/2023 1612 by Sarkis Clements RN  Outcome: Progressing  9/13/2023 1610 by Sarkis Clements RN  Outcome: Progressing  Flowsheets (Taken 9/13/2023 1458)  Absence of Physical Injury: Implement safety measures based on patient assessment     Problem: Respiratory - Adult  Goal: Achieves optimal ventilation and oxygenation  9/13/2023 1612 by Sarkis Clements RN  Outcome: Progressing  9/13/2023 1610 by Sarkis Clements RN  Outcome: Progressing     Problem: Pain  Goal: Verbalizes/displays adequate comfort level or baseline comfort level  9/13/2023 1612 by Sarkis Clements RN  Outcome: Progressing  9/13/2023 1610 by Sarkis Clements RN  Outcome: Progressing
optimal renal function maintained  9/9/2023 0609 by Padilla Elias RN  Outcome: Progressing  9/8/2023 1656 by Roshni Gutierrez RN  Outcome: Progressing  Flowsheets (Taken 9/8/2023 1544)  Hemodynamic stability and optimal renal function maintained:   Monitor labs and assess for signs and symptoms of volume excess or deficit   Monitor response to interventions for patient's volume status, including labs, urine output, blood pressure (other measures as available)   Encourage oral intake as appropriate   Instruct patient on fluid and nutrition restrictions as appropriate  Goal: Glucose maintained within prescribed range  9/9/2023 0609 by Padilla Elias RN  Outcome: Progressing  9/8/2023 1656 by Roshni Gutierrez RN  Outcome: Progressing  Flowsheets (Taken 9/8/2023 1544)  Glucose maintained within prescribed range:   Monitor blood glucose as ordered   Assess for signs and symptoms of hyperglycemia and hypoglycemia   Administer ordered medications to maintain glucose within target range   Assess barriers to adequate nutritional intake and initiate nutrition consult as needed   Instruct patient on self management of diabetes and initiate consult as needed     Problem: Chronic Conditions and Co-morbidities  Goal: Patient's chronic conditions and co-morbidity symptoms are monitored and maintained or improved  9/9/2023 0609 by Padilla Elias RN  Outcome: Progressing  9/8/2023 1656 by Roshni Gutierrez RN  Outcome: Progressing  Flowsheets (Taken 9/8/2023 1544)  Care Plan - Patient's Chronic Conditions and Co-Morbidity Symptoms are Monitored and Maintained or Improved:   Monitor and assess patient's chronic conditions and comorbid symptoms for stability, deterioration, or improvement   Collaborate with multidisciplinary team to address chronic and comorbid conditions and prevent exacerbation or deterioration   Update acute care plan with appropriate goals if chronic or comorbid symptoms are exacerbated and prevent overall

## 2023-09-18 NOTE — DISCHARGE INSTR - COC
Continuity of Care Form    Patient Name: Kayla Umanzor   :  1965  MRN:  [id number]    Admit date:  2023  Discharge date:  2023    Code Status Order: Full Code   Advance Directives:     Admitting Physician:  No admitting provider for patient encounter. PCP: Dilcia Henderson MD    Discharging Nurse: [facility]/Room#: 0318/0318-01  Discharging Unit Phone Number: [phone]    Emergency Contact:   Extended Emergency Contact Information  Primary Emergency Contact: Steffanie Phone: [phone]  Relation: Other  Secondary Emergency Contact: [place] Phone: [phone]  Mobile Phone: [phone]  Relation: Child    Past Surgical History:  Past Surgical History:   Procedure Laterality Date    ABOVE KNEE AMPUTATION Right 2019    AMPUTATION ABOVE KNEE Right 2019    RIGHT ABOVE KNEE AMPUTATION performed by Perla Webb MD at [address] [facility] [facility]  2019    MVD-Stv    CAROTID STENT PLACEMENT  2019    ACCULINK CAROTID STENT PLACED, MRI CONDITIONAL 5, 3T OK.      CORONARY ARTERY BYPASS GRAFT  04/07/2019    x4 per Dr. Matias Dominguez, 32-[facility] GRAFT N/A 2019    EMERGENT CPR,  CORONARY ARTERY BYPASS X4, ON PUMP ,CHEST LEFT OPEN; SWAN, ANGELA PER ANESTHESIA performed by Carlyle Yo MD at [address] [facility]  2023    CT NEEDLE BIOPSY LUNG PERCUTANEOUS 2023 [facility] CT SCAN    FEMUR FRACTURE SURGERY Right 2000    HARDWARE REMOVAL Right 2019    R Knee internal screw protruding through skin    HARDWARE REMOVAL Right 2019    HARDWARE REMOVAL RIGHT TIBIA,    INSERT / Misti Pel / REMOVE PACEMAKER  2023    Epicardial leads present from CABG - abandoned leads - NOT MRI Safe - NO MRI - KMM (MRI Tech)    IR NONTUNNELED VASCULAR CATHETER  2023    IR NONTUNNELED VASCULAR CATHETER 2023 Pasha Sainz MD [facility]    LEG

## 2023-09-18 NOTE — CARE COORDINATION
TRANSITIONAL CARE PLANNING/ [facility] Day: 13    Reason for Admission: Septicemia ([address]) [A41.9]  Carotid stenosis, bilateral [I65.23]  ALMITA (acute kidney injury) ([address]) [N17.9]  Hypotension, unspecified hypotension type [I95.9]         Readmission Risk              Risk of Unplanned Readmission:  32      Patient goals/Treatment Preferences/Transitional Plan:   Need updated ot note  Await precert victorino lindquist    15:11 Called OV spoke to Latonia still await precert     55:95 [facility] called have sonny giang dr Author Creed to update need theo signed  Faxed request to [facility]  @ 20:00  Faxed avs to [facility]   [phone] completed

## 2023-09-19 ENCOUNTER — TELEPHONE (OUTPATIENT)
Dept: FAMILY MEDICINE CLINIC | Age: 58
End: 2023-09-19

## 2023-09-19 NOTE — TELEPHONE ENCOUNTER
Care Transitions Initial Follow Up Call    Outreach made within 2 business days of discharge: Yes    Patient: Kayla Umanzor Patient : 1965   MRN: [id number]  Reason for Admission: There are no discharge diagnoses documented for the most recent discharge.   Discharge Date: 23       Spoke with: Patient - He will call to schedule after discharge from skilled nursing    Discharge department/facility: [address] [place] [address]

## 2023-09-20 ENCOUNTER — HOSPITAL ENCOUNTER (OUTPATIENT)
Age: 58
Setting detail: SPECIMEN
Discharge: HOME | End: 2023-09-20

## 2023-09-20 LAB
ANION GAP SERPL CALCULATED.3IONS-SCNC: 15 MMOL/L (ref 9–17)
BUN SERPL-MCNC: 61 MG/DL (ref 6–20)
CALCIUM SERPL-MCNC: 9.5 MG/DL (ref 8.6–10.4)
CHLORIDE SERPL-SCNC: 102 MMOL/L (ref 98–107)
CO2 SERPL-SCNC: 21 MMOL/L (ref 20–31)
CREAT SERPL-MCNC: 2.7 MG/DL (ref 0.7–1.2)
ERYTHROCYTE [DISTWIDTH] IN BLOOD BY AUTOMATED COUNT: 13.7 % (ref 11.8–14.4)
GFR SERPL CREATININE-BSD FRML MDRD: 26 ML/MIN/1.73M2
GLUCOSE SERPL-MCNC: 79 MG/DL (ref 70–99)
HCT VFR BLD AUTO: 31.7 % (ref 40.7–50.3)
HGB BLD-MCNC: 10 G/DL (ref 13–17)
MCH RBC QN AUTO: 31.3 PG (ref 25.2–33.5)
MCHC RBC AUTO-ENTMCNC: 31.5 G/DL (ref 28.4–34.8)
MCV RBC AUTO: 99.4 FL (ref 82.6–102.9)
NRBC BLD-RTO: 0 PER 100 WBC
PLATELET # BLD AUTO: 582 K/UL (ref 138–453)
PMV BLD AUTO: 9.9 FL (ref 8.1–13.5)
POTASSIUM SERPL-SCNC: 4.2 MMOL/L (ref 3.7–5.3)
RBC # BLD AUTO: 3.19 M/UL (ref 4.21–5.77)
SODIUM SERPL-SCNC: 138 MMOL/L (ref 135–144)
WBC OTHER # BLD: 10.1 K/UL (ref 3.5–11.3)

## 2023-09-20 PROCEDURE — 36415 COLL VENOUS BLD VENIPUNCTURE: CPT

## 2023-09-20 PROCEDURE — 85027 COMPLETE CBC AUTOMATED: CPT

## 2023-09-20 PROCEDURE — P9603 ONE-WAY ALLOW PRORATED MILES: HCPCS

## 2023-09-20 PROCEDURE — 80048 BASIC METABOLIC PNL TOTAL CA: CPT

## 2023-09-27 ENCOUNTER — HOSPITAL ENCOUNTER (OUTPATIENT)
Age: 58
Setting detail: SPECIMEN
Discharge: HOME | End: 2023-09-27

## 2023-09-27 LAB
ANION GAP SERPL CALCULATED.3IONS-SCNC: 13 MMOL/L (ref 9–17)
BUN SERPL-MCNC: 82 MG/DL (ref 6–20)
CALCIUM SERPL-MCNC: 8.9 MG/DL (ref 8.6–10.4)
CHLORIDE SERPL-SCNC: 101 MMOL/L (ref 98–107)
CO2 SERPL-SCNC: 18 MMOL/L (ref 20–31)
CREAT SERPL-MCNC: 2.8 MG/DL (ref 0.7–1.2)
ERYTHROCYTE [DISTWIDTH] IN BLOOD BY AUTOMATED COUNT: 13.2 % (ref 11.8–14.4)
GFR SERPL CREATININE-BSD FRML MDRD: 25 ML/MIN/1.73M2
GLUCOSE SERPL-MCNC: 147 MG/DL (ref 70–99)
HCT VFR BLD AUTO: 30 % (ref 40.7–50.3)
HGB BLD-MCNC: 9.6 G/DL (ref 13–17)
MCH RBC QN AUTO: 32.3 PG (ref 25.2–33.5)
MCHC RBC AUTO-ENTMCNC: 32 G/DL (ref 28.4–34.8)
MCV RBC AUTO: 101 FL (ref 82.6–102.9)
NRBC BLD-RTO: 0 PER 100 WBC
PLATELET # BLD AUTO: 525 K/UL (ref 138–453)
PMV BLD AUTO: 10.1 FL (ref 8.1–13.5)
POTASSIUM SERPL-SCNC: 5.1 MMOL/L (ref 3.7–5.3)
RBC # BLD AUTO: 2.97 M/UL (ref 4.21–5.77)
SODIUM SERPL-SCNC: 132 MMOL/L (ref 135–144)
WBC OTHER # BLD: 10.3 K/UL (ref 3.5–11.3)

## 2023-09-27 PROCEDURE — P9603 ONE-WAY ALLOW PRORATED MILES: HCPCS

## 2023-09-27 PROCEDURE — 85027 COMPLETE CBC AUTOMATED: CPT

## 2023-09-27 PROCEDURE — 80048 BASIC METABOLIC PNL TOTAL CA: CPT

## 2023-09-27 PROCEDURE — 36415 COLL VENOUS BLD VENIPUNCTURE: CPT

## 2023-10-04 ENCOUNTER — HOSPITAL ENCOUNTER (OUTPATIENT)
Age: 58
Setting detail: SPECIMEN
Discharge: HOME | End: 2023-10-04

## 2023-10-04 ENCOUNTER — OFFICE VISIT (OUTPATIENT)
Dept: PULMONOLOGY | Age: 58
End: 2023-10-04
Payer: MEDICAID

## 2023-10-04 VITALS
HEIGHT: 65 IN | DIASTOLIC BLOOD PRESSURE: 53 MMHG | WEIGHT: 119 LBS | SYSTOLIC BLOOD PRESSURE: 83 MMHG | BODY MASS INDEX: 19.83 KG/M2 | HEART RATE: 79 BPM | RESPIRATION RATE: 99 BRPM

## 2023-10-04 DIAGNOSIS — J43.2 CENTRILOBULAR EMPHYSEMA (HCC): ICD-10-CM

## 2023-10-04 DIAGNOSIS — R91.1 LUNG NODULE: ICD-10-CM

## 2023-10-04 DIAGNOSIS — Z87.891 HISTORY OF SMOKING 30 OR MORE PACK YEARS: ICD-10-CM

## 2023-10-04 DIAGNOSIS — C34.11 MALIGNANT NEOPLASM OF UPPER LOBE OF RIGHT LUNG (HCC): Primary | ICD-10-CM

## 2023-10-04 DIAGNOSIS — R06.00 DYSPNEA, UNSPECIFIED TYPE: ICD-10-CM

## 2023-10-04 LAB
ANION GAP SERPL CALCULATED.3IONS-SCNC: 13 MMOL/L (ref 9–17)
BUN SERPL-MCNC: 104 MG/DL (ref 6–20)
CALCIUM SERPL-MCNC: 9.1 MG/DL (ref 8.6–10.4)
CHLORIDE SERPL-SCNC: 107 MMOL/L (ref 98–107)
CO2 SERPL-SCNC: 15 MMOL/L (ref 20–31)
CREAT SERPL-MCNC: 3 MG/DL (ref 0.7–1.2)
ERYTHROCYTE [DISTWIDTH] IN BLOOD BY AUTOMATED COUNT: 13.1 % (ref 11.8–14.4)
GFR SERPL CREATININE-BSD FRML MDRD: 23 ML/MIN/1.73M2
GLUCOSE SERPL-MCNC: 80 MG/DL (ref 70–99)
HCT VFR BLD AUTO: 31.6 % (ref 40.7–50.3)
HGB BLD-MCNC: 10.4 G/DL (ref 13–17)
MCH RBC QN AUTO: 32.4 PG (ref 25.2–33.5)
MCHC RBC AUTO-ENTMCNC: 32.9 G/DL (ref 28.4–34.8)
MCV RBC AUTO: 98.4 FL (ref 82.6–102.9)
NRBC BLD-RTO: 0 PER 100 WBC
PLATELET # BLD AUTO: 369 K/UL (ref 138–453)
PMV BLD AUTO: 10.8 FL (ref 8.1–13.5)
POTASSIUM SERPL-SCNC: 5.8 MMOL/L (ref 3.7–5.3)
RBC # BLD AUTO: 3.21 M/UL (ref 4.21–5.77)
SODIUM SERPL-SCNC: 135 MMOL/L (ref 135–144)
WBC OTHER # BLD: 12.1 K/UL (ref 3.5–11.3)

## 2023-10-04 PROCEDURE — 99214 OFFICE O/P EST MOD 30 MIN: CPT | Performed by: INTERNAL MEDICINE

## 2023-10-04 PROCEDURE — 3074F SYST BP LT 130 MM HG: CPT | Performed by: INTERNAL MEDICINE

## 2023-10-04 PROCEDURE — 85027 COMPLETE CBC AUTOMATED: CPT

## 2023-10-04 PROCEDURE — 3078F DIAST BP <80 MM HG: CPT | Performed by: INTERNAL MEDICINE

## 2023-10-04 PROCEDURE — 3017F COLORECTAL CA SCREEN DOC REV: CPT | Performed by: INTERNAL MEDICINE

## 2023-10-04 PROCEDURE — P9603 ONE-WAY ALLOW PRORATED MILES: HCPCS

## 2023-10-04 PROCEDURE — 1036F TOBACCO NON-USER: CPT | Performed by: INTERNAL MEDICINE

## 2023-10-04 PROCEDURE — 80048 BASIC METABOLIC PNL TOTAL CA: CPT

## 2023-10-04 PROCEDURE — 36415 COLL VENOUS BLD VENIPUNCTURE: CPT

## 2023-10-04 PROCEDURE — G8484 FLU IMMUNIZE NO ADMIN: HCPCS | Performed by: INTERNAL MEDICINE

## 2023-10-04 PROCEDURE — G8420 CALC BMI NORM PARAMETERS: HCPCS | Performed by: INTERNAL MEDICINE

## 2023-10-04 PROCEDURE — 3023F SPIROM DOC REV: CPT | Performed by: INTERNAL MEDICINE

## 2023-10-04 PROCEDURE — G8427 DOCREV CUR MEDS BY ELIG CLIN: HCPCS | Performed by: INTERNAL MEDICINE

## 2023-10-04 PROCEDURE — 1111F DSCHRG MED/CURRENT MED MERGE: CPT | Performed by: INTERNAL MEDICINE

## 2023-10-04 RX ORDER — ALBUTEROL SULFATE 90 UG/1
2 AEROSOL, METERED RESPIRATORY (INHALATION) 4 TIMES DAILY PRN
Qty: 18 G | Refills: 5 | Status: SHIPPED | OUTPATIENT
Start: 2023-10-04

## 2023-10-04 NOTE — PROGRESS NOTES
(ONETOUCH DELICA PLUS [id number]) MISC USE AS DIRECTED TO TEST BLOOD SUGAR 100 each 3    Blood Glucose Monitoring Suppl (ONE TOUCH ULTRA 2) w/Device KIT U UTD ONCE D  0    Handicap Placard MISC by Does not apply route For 5 years 1 each 0    Blood Glucose Monitoring Suppl (ACURA BLOOD GLUCOSE METER) w/Device KIT 1 kit by Does not apply route once for 1 dose 1 kit 0    Multiple Vitamins-Minerals (THERAPEUTIC MULTIVITAMIN-MINERALS) tablet Take 1 tablet by mouth daily (with breakfast)       No facility-administered encounter medications on file as of 10/4/2023. Social History:   TOBACCO:   reports that he quit smoking about 8 months ago. His smoking use included cigarettes. He started smoking about 41 years ago. He has a 20.00 pack-year smoking history. He has never used smokeless tobacco.  ETOH:   reports no history of alcohol use.   OCCUPATION:      Family History:       Problem Relation Age of Onset    Heart Disease Mother         CAD WITH STENT    Diabetes Mother     Stroke Mother     Diabetes Father     Stroke Father     Heart Disease Father         CAD    High Blood Pressure Father     High Blood Pressure Sister     Diabetes Brother     Heart Disease Brother     High Blood Pressure Brother     Stroke Brother        Immunizations:    Immunization History   Administered Date(s) Administered    Hep B, ENGERIX-B, (age 20y+), IM, 1mL 12/10/2020, 01/07/2021, 01/27/2023    Influenza, FLUARIX, FLULAVAL, FLUZONE (age 10 mo+) AND AFLURIA, (age 1 y+), PF, 0.5mL 09/27/2019, 01/27/2023    Pneumococcal, PPSV23, PNEUMOVAX 21, (age 2y+), SC/IM, 0.5mL 09/27/2019    TDaP, ADACEL (age 6y-58y), Yarbrough Barton (age 10y+), IM, 0.5mL 11/11/2015    Zoster Recombinant (Shingrix) 11/08/2019, 03/11/2020         REVIEW OF SYSTEMS:  CONSTITUTIONAL:  negative for  fevers, chills, sweats, fatigue, anorexia, and weight loss  EYES:  negative for  double vision, blurred vision, dry eyes, eye discharge, visual disturbance, redness, and

## 2023-10-05 ENCOUNTER — HOSPITAL ENCOUNTER (OUTPATIENT)
Age: 58
Setting detail: SPECIMEN
Discharge: HOME | End: 2023-10-05

## 2023-10-05 LAB
ANION GAP SERPL CALCULATED.3IONS-SCNC: 13 MMOL/L (ref 9–17)
BASOPHILS # BLD: 0.1 K/UL (ref 0–0.2)
BASOPHILS NFR BLD: 1 % (ref 0–2)
BNP SERPL-MCNC: 1011 PG/ML
BUN SERPL-MCNC: 100 MG/DL (ref 6–20)
CALCIUM SERPL-MCNC: 8.9 MG/DL (ref 8.6–10.4)
CHLORIDE SERPL-SCNC: 107 MMOL/L (ref 98–107)
CO2 SERPL-SCNC: 17 MMOL/L (ref 20–31)
CREAT SERPL-MCNC: 2.9 MG/DL (ref 0.7–1.2)
EOSINOPHIL # BLD: 0.35 K/UL (ref 0–0.44)
EOSINOPHILS RELATIVE PERCENT: 3 % (ref 1–4)
ERYTHROCYTE [DISTWIDTH] IN BLOOD BY AUTOMATED COUNT: 13.2 % (ref 11.8–14.4)
GFR SERPL CREATININE-BSD FRML MDRD: 24 ML/MIN/1.73M2
GLUCOSE SERPL-MCNC: 132 MG/DL (ref 70–99)
HCT VFR BLD AUTO: 30.4 % (ref 40.7–50.3)
HGB BLD-MCNC: 9.6 G/DL (ref 13–17)
IMM GRANULOCYTES # BLD AUTO: 0.07 K/UL (ref 0–0.3)
IMM GRANULOCYTES NFR BLD: 1 %
LEVETIRACETAM SERPL-MCNC: 61 UG/ML
LYMPHOCYTES # BLD: 17 % (ref 24–43)
LYMPHOCYTES NFR BLD: 1.92 K/UL (ref 1.1–3.7)
MCH RBC QN AUTO: 31.5 PG (ref 25.2–33.5)
MCHC RBC AUTO-ENTMCNC: 31.6 G/DL (ref 28.4–34.8)
MCV RBC AUTO: 99.7 FL (ref 82.6–102.9)
MONOCYTES NFR BLD: 0.88 K/UL (ref 0.1–1.2)
MONOCYTES NFR BLD: 8 % (ref 3–12)
NEUTROPHILS NFR BLD: 70 % (ref 36–65)
NEUTS SEG NFR BLD: 8.04 K/UL (ref 1.5–8.1)
NRBC BLD-RTO: 0 PER 100 WBC
PLATELET # BLD AUTO: 390 K/UL (ref 138–453)
PMV BLD AUTO: 10.8 FL (ref 8.1–13.5)
POTASSIUM SERPL-SCNC: 5.4 MMOL/L (ref 3.7–5.3)
RBC # BLD AUTO: 3.05 M/UL (ref 4.21–5.77)
SODIUM SERPL-SCNC: 137 MMOL/L (ref 135–144)
WBC OTHER # BLD: 11.4 K/UL (ref 3.5–11.3)

## 2023-10-05 PROCEDURE — P9603 ONE-WAY ALLOW PRORATED MILES: HCPCS

## 2023-10-05 PROCEDURE — 36415 COLL VENOUS BLD VENIPUNCTURE: CPT

## 2023-10-05 PROCEDURE — 80048 BASIC METABOLIC PNL TOTAL CA: CPT

## 2023-10-05 PROCEDURE — 80177 DRUG SCRN QUAN LEVETIRACETAM: CPT

## 2023-10-05 PROCEDURE — 85025 COMPLETE CBC W/AUTO DIFF WBC: CPT

## 2023-10-05 PROCEDURE — 83880 ASSAY OF NATRIURETIC PEPTIDE: CPT

## 2023-10-06 ENCOUNTER — HOSPITAL ENCOUNTER (OUTPATIENT)
Age: 58
Setting detail: SPECIMEN
Discharge: HOME | End: 2023-10-06

## 2023-10-06 LAB
ANION GAP SERPL CALCULATED.3IONS-SCNC: 12 MMOL/L (ref 9–16)
BUN SERPL-MCNC: 79 MG/DL (ref 6–20)
CALCIUM SERPL-MCNC: 8.8 MG/DL (ref 8.6–10.4)
CHLORIDE SERPL-SCNC: 110 MMOL/L (ref 98–107)
CO2 SERPL-SCNC: 18 MMOL/L (ref 20–31)
CREAT SERPL-MCNC: 2.4 MG/DL (ref 0.7–1.2)
GFR SERPL CREATININE-BSD FRML MDRD: 30 ML/MIN/1.73M2
GLUCOSE SERPL-MCNC: 163 MG/DL (ref 74–99)
POTASSIUM SERPL-SCNC: 5.3 MMOL/L (ref 3.7–5.3)
SODIUM SERPL-SCNC: 140 MMOL/L (ref 136–145)

## 2023-10-06 PROCEDURE — P9603 ONE-WAY ALLOW PRORATED MILES: HCPCS

## 2023-10-06 PROCEDURE — 80048 BASIC METABOLIC PNL TOTAL CA: CPT

## 2023-10-06 PROCEDURE — 36415 COLL VENOUS BLD VENIPUNCTURE: CPT

## 2023-10-09 RX ORDER — PSEUDOEPHED/ACETAMINOPH/DIPHEN 30MG-500MG
TABLET ORAL
Qty: 120 TABLET | Refills: 0 | Status: SHIPPED | OUTPATIENT
Start: 2023-10-09

## 2023-10-09 NOTE — TELEPHONE ENCOUNTER
[facility] called asking if provider will follow patient's home care . They were informed that once patient is released from Rehab he will need an appointment since it has been a while since patient has been in.  They will inform patient
([address])     Sinus tachycardia

## 2023-10-10 ENCOUNTER — OUTSIDE SERVICES (OUTPATIENT)
Dept: INFECTIOUS DISEASES | Age: 58
End: 2023-10-10

## 2023-10-10 ENCOUNTER — HOSPITAL ENCOUNTER (OUTPATIENT)
Age: 58
Setting detail: SPECIMEN
Discharge: HOME | End: 2023-10-10

## 2023-10-10 VITALS
OXYGEN SATURATION: 98 % | SYSTOLIC BLOOD PRESSURE: 147 MMHG | RESPIRATION RATE: 16 BRPM | TEMPERATURE: 97.6 F | DIASTOLIC BLOOD PRESSURE: 52 MMHG | HEART RATE: 115 BPM

## 2023-10-10 DIAGNOSIS — D72.829 LEUKOCYTOSIS, UNSPECIFIED TYPE: Primary | ICD-10-CM

## 2023-10-10 LAB
ANION GAP SERPL CALCULATED.3IONS-SCNC: 12 MMOL/L (ref 9–17)
BUN SERPL-MCNC: 67 MG/DL (ref 6–20)
CALCIUM SERPL-MCNC: 9 MG/DL (ref 8.6–10.4)
CHLORIDE SERPL-SCNC: 104 MMOL/L (ref 98–107)
CO2 SERPL-SCNC: 21 MMOL/L (ref 20–31)
CREAT SERPL-MCNC: 2.4 MG/DL (ref 0.7–1.2)
ERYTHROCYTE [DISTWIDTH] IN BLOOD BY AUTOMATED COUNT: 13.3 % (ref 11.8–14.4)
GFR SERPL CREATININE-BSD FRML MDRD: 30 ML/MIN/1.73M2
GLUCOSE SERPL-MCNC: 144 MG/DL (ref 70–99)
HCT VFR BLD AUTO: 30.8 % (ref 40.7–50.3)
HGB BLD-MCNC: 10 G/DL (ref 13–17)
LEVETIRACETAM SERPL-MCNC: 22 UG/ML
MCH RBC QN AUTO: 31.9 PG (ref 25.2–33.5)
MCHC RBC AUTO-ENTMCNC: 32.5 G/DL (ref 28.4–34.8)
MCV RBC AUTO: 98.4 FL (ref 82.6–102.9)
NRBC BLD-RTO: 0 PER 100 WBC
PLATELET # BLD AUTO: 372 K/UL (ref 138–453)
PMV BLD AUTO: 10.4 FL (ref 8.1–13.5)
POTASSIUM SERPL-SCNC: 4.9 MMOL/L (ref 3.7–5.3)
RBC # BLD AUTO: 3.13 M/UL (ref 4.21–5.77)
SODIUM SERPL-SCNC: 137 MMOL/L (ref 135–144)
WBC OTHER # BLD: 12.6 K/UL (ref 3.5–11.3)

## 2023-10-10 PROCEDURE — 80177 DRUG SCRN QUAN LEVETIRACETAM: CPT

## 2023-10-10 PROCEDURE — 36415 COLL VENOUS BLD VENIPUNCTURE: CPT

## 2023-10-10 PROCEDURE — 85027 COMPLETE CBC AUTOMATED: CPT

## 2023-10-10 PROCEDURE — P9603 ONE-WAY ALLOW PRORATED MILES: HCPCS

## 2023-10-10 PROCEDURE — 80048 BASIC METABOLIC PNL TOTAL CA: CPT

## 2023-10-10 NOTE — PROGRESS NOTES
[facility] -   Infectious diseases evaluation  admission date 9/18/2023    reason for consultation:   Leukocytosis    Impression :   Current:  Leukocytosis    Other:    Discussion / summary of stay / plan of care     Recommendations   Monitor off antibiotics. CBC with diff, BNP, CRP, procalcitonin in the am.  Supportive care. Discussed with patient, nursing staff, [facility]. 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 No associated symptoms of fever, chills, n/v/d/dysuria, chest pain, shortness of breath. Interval changes  10/10/2023   Alert and oriented x 4. Sitting up in wheelchair. Denies any fever, chills, n/v/d, wounds. Summary of relevant labs:  Labs:    Micro:    Imaging:      I have personally reviewed the past medical history, past surgical history, medications, social history, and family history, and I haveupdated the database accordingly. Allergies:   Patient has no known allergies. Review of Systems:     Review of Systems   Musculoskeletal:         R AKA. All other systems reviewed and are negative. Physical Examination :       Physical Exam  Vitals and nursing note reviewed. Constitutional:       Appearance: Normal appearance. He is not ill-appearing. HENT:      Head: Normocephalic and atraumatic.       Right Ear: External ear normal.      Left Ear: External ear normal.      Nose: Nose normal.      Mouth/Throat:      Mouth: Mucous membranes are

## 2023-10-11 ENCOUNTER — HOSPITAL ENCOUNTER (OUTPATIENT)
Age: 58
Setting detail: SPECIMEN
Discharge: HOME | End: 2023-10-11

## 2023-10-11 LAB
BASOPHILS # BLD: 0.08 K/UL (ref 0–0.2)
BASOPHILS NFR BLD: 1 % (ref 0–2)
BNP SERPL-MCNC: 1678 PG/ML
CRP SERPL HS-MCNC: 4.8 MG/L (ref 0–5)
EOSINOPHIL # BLD: 0.28 K/UL (ref 0–0.44)
EOSINOPHILS RELATIVE PERCENT: 2 % (ref 1–4)
ERYTHROCYTE [DISTWIDTH] IN BLOOD BY AUTOMATED COUNT: 13.2 % (ref 11.8–14.4)
HCT VFR BLD AUTO: 30.8 % (ref 40.7–50.3)
HGB BLD-MCNC: 9.7 G/DL (ref 13–17)
IMM GRANULOCYTES # BLD AUTO: 0.05 K/UL (ref 0–0.3)
IMM GRANULOCYTES NFR BLD: 0 %
LYMPHOCYTES # BLD: 11 % (ref 24–43)
LYMPHOCYTES NFR BLD: 1.3 K/UL (ref 1.1–3.7)
MCH RBC QN AUTO: 31 PG (ref 25.2–33.5)
MCHC RBC AUTO-ENTMCNC: 31.5 G/DL (ref 28.4–34.8)
MCV RBC AUTO: 98.4 FL (ref 82.6–102.9)
MONOCYTES NFR BLD: 0.85 K/UL (ref 0.1–1.2)
MONOCYTES NFR BLD: 7 % (ref 3–12)
NEUTROPHILS NFR BLD: 79 % (ref 36–65)
NEUTS SEG NFR BLD: 9.18 K/UL (ref 1.5–8.1)
NRBC BLD-RTO: 0 PER 100 WBC
PLATELET # BLD AUTO: 356 K/UL (ref 138–453)
PMV BLD AUTO: 10.2 FL (ref 8.1–13.5)
PROCALCITONIN SERPL-MCNC: 0.08 NG/ML
RBC # BLD AUTO: 3.13 M/UL (ref 4.21–5.77)
WBC OTHER # BLD: 11.7 K/UL (ref 3.5–11.3)

## 2023-10-11 PROCEDURE — 36415 COLL VENOUS BLD VENIPUNCTURE: CPT

## 2023-10-11 PROCEDURE — P9603 ONE-WAY ALLOW PRORATED MILES: HCPCS

## 2023-10-11 PROCEDURE — 83880 ASSAY OF NATRIURETIC PEPTIDE: CPT

## 2023-10-11 PROCEDURE — 84145 PROCALCITONIN (PCT): CPT

## 2023-10-11 PROCEDURE — 86140 C-REACTIVE PROTEIN: CPT

## 2023-10-11 PROCEDURE — 85025 COMPLETE CBC W/AUTO DIFF WBC: CPT

## 2023-10-20 ENCOUNTER — OFFICE VISIT (OUTPATIENT)
Dept: FAMILY MEDICINE CLINIC | Age: 58
End: 2023-10-20
Payer: MEDICAID

## 2023-10-20 VITALS
WEIGHT: 123 LBS | SYSTOLIC BLOOD PRESSURE: 117 MMHG | TEMPERATURE: 96.8 F | DIASTOLIC BLOOD PRESSURE: 61 MMHG | BODY MASS INDEX: 20.49 KG/M2 | HEIGHT: 65 IN | OXYGEN SATURATION: 94 % | HEART RATE: 90 BPM

## 2023-10-20 DIAGNOSIS — Z79.4 TYPE 2 DIABETES MELLITUS WITH DIABETIC POLYNEUROPATHY, WITH LONG-TERM CURRENT USE OF INSULIN (HCC): Primary | ICD-10-CM

## 2023-10-20 DIAGNOSIS — I10 ESSENTIAL HYPERTENSION: ICD-10-CM

## 2023-10-20 DIAGNOSIS — R91.1 LUNG NODULE: ICD-10-CM

## 2023-10-20 DIAGNOSIS — M79.602 PAIN OF LEFT UPPER EXTREMITY: ICD-10-CM

## 2023-10-20 DIAGNOSIS — E11.42 TYPE 2 DIABETES MELLITUS WITH DIABETIC POLYNEUROPATHY, WITH LONG-TERM CURRENT USE OF INSULIN (HCC): Primary | ICD-10-CM

## 2023-10-20 LAB — HBA1C MFR BLD: 6.1 %

## 2023-10-20 PROCEDURE — 83036 HEMOGLOBIN GLYCOSYLATED A1C: CPT

## 2023-10-20 PROCEDURE — G8420 CALC BMI NORM PARAMETERS: HCPCS

## 2023-10-20 PROCEDURE — 99213 OFFICE O/P EST LOW 20 MIN: CPT

## 2023-10-20 PROCEDURE — 3078F DIAST BP <80 MM HG: CPT

## 2023-10-20 PROCEDURE — G8484 FLU IMMUNIZE NO ADMIN: HCPCS

## 2023-10-20 PROCEDURE — 2022F DILAT RTA XM EVC RTNOPTHY: CPT

## 2023-10-20 PROCEDURE — 3044F HG A1C LEVEL LT 7.0%: CPT

## 2023-10-20 PROCEDURE — G8427 DOCREV CUR MEDS BY ELIG CLIN: HCPCS

## 2023-10-20 PROCEDURE — 3074F SYST BP LT 130 MM HG: CPT

## 2023-10-20 PROCEDURE — 3017F COLORECTAL CA SCREEN DOC REV: CPT

## 2023-10-20 PROCEDURE — 1036F TOBACCO NON-USER: CPT

## 2023-10-20 ASSESSMENT — ENCOUNTER SYMPTOMS
APNEA: 0
CHEST TIGHTNESS: 0
ABDOMINAL PAIN: 0
GASTROINTESTINAL NEGATIVE: 1
SHORTNESS OF BREATH: 0
ABDOMINAL DISTENTION: 0

## 2023-10-20 NOTE — PROGRESS NOTES
DIABETES and HYPERTENSION visit    BP Readings from Last 3 Encounters:   10/04/23 (!) 83/53   09/18/23 (!) 161/65   08/17/23 124/78        Hemoglobin A1C (%)   Date Value   05/04/2023 6.4   02/24/2023 10.3   01/09/2023 12.7 (H)     LDL Cholesterol (mg/dL)   Date Value   01/09/2023 61     HDL (mg/dL)   Date Value   01/09/2023 26 (L)     BUN (mg/dL)   Date Value   10/10/2023 67 (H)     Creatinine (mg/dL)   Date Value   10/10/2023 2.4 (H)     Glucose (mg/dL)   Date Value   10/10/2023 144 (H)            Have you changed or started any medications since your last visit including any over-the-counter medicines, vitamins, or herbal medicines? no   Have you stopped taking any of your medications? Is so, why? -  no  Are you having any side effects from any of your medications? - no    Have you seen any other physician or provider since your last visit? yes - Oncology , Pulmonary ,    Have you had any other diagnostic tests since your last visit? yes - Labs , duplex , CT- Chest , US, US- Head , EKG,Echo, PET,    Have you been seen in the emergency room and/or had an admission in a hospital since we last saw you?  yes - [facility]   Have you had your routine dental cleaning in the past 6 months?  no     Have you had your annual diabetic retinal (eye) exam? No   (ensure copy of exam is in the chart)    Do you have an active MyChart account? If no, what is the barrier?   No: declined     Patient Care Team:  April Davis MD as PCP - General (Family Medicine)  Cali Olivier RN as Nurse Navigator (Oncology)  Uri Fish MD as Consulting Physician (Pulmonology)    Medical History Review  Past Medical, Family, and Social History reviewed and does not contribute to the patient presenting condition    Health Maintenance   Topic Date Due    COVID-19 Vaccine (1) Never done    Diabetic retinal exam  Never done    Colorectal Cancer Screen  Never done    Pneumococcal 0-64 years Vaccine (2 - PCV) 09/27/2020    Flu vaccine (1)
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
## 2023-11-14 ENCOUNTER — TELEPHONE (OUTPATIENT)
Dept: CASE MANAGEMENT | Age: 58
End: 2023-11-14

## 2023-11-14 NOTE — TELEPHONE ENCOUNTER
Name: Nicolette Shaw  : 1965  MRN: [id number]    Oncology Navigation Follow-Up Note  Navigator spoke with pt. Offering assistance if needed. Upcoming scans  and F/U . Pt. Denies needs.     Electronically signed by Channing Briseno RN on 2023 at 2:58 PM

## 2023-11-16 ENCOUNTER — HOSPITAL ENCOUNTER (OUTPATIENT)
Age: 58
Discharge: HOME | End: 2023-11-16
Payer: MEDICAID

## 2023-11-16 ENCOUNTER — HOSPITAL ENCOUNTER (OUTPATIENT)
Dept: CT IMAGING | Age: 58
Discharge: HOME | End: 2023-11-18
Attending: RADIOLOGY
Payer: MEDICAID

## 2023-11-16 DIAGNOSIS — R91.1 LUNG NODULE: Primary | ICD-10-CM

## 2023-11-16 DIAGNOSIS — I73.9 PAD (PERIPHERAL ARTERY DISEASE) (HCC): ICD-10-CM

## 2023-11-16 DIAGNOSIS — R91.1 LUNG NODULE: ICD-10-CM

## 2023-11-16 DIAGNOSIS — C34.11 PRIMARY ADENOCARCINOMA OF UPPER LOBE OF RIGHT LUNG (HCC): ICD-10-CM

## 2023-11-16 LAB
CREAT SERPL-MCNC: 2.2 MG/DL (ref 0.7–1.2)
GFR SERPL CREATININE-BSD FRML MDRD: 34 ML/MIN/1.73M2

## 2023-11-16 PROCEDURE — 71250 CT THORAX DX C-: CPT

## 2023-11-16 PROCEDURE — 82565 ASSAY OF CREATININE: CPT

## 2023-11-16 PROCEDURE — 36415 COLL VENOUS BLD VENIPUNCTURE: CPT

## 2023-11-28 ENCOUNTER — TELEPHONE (OUTPATIENT)
Dept: ONCOLOGY | Age: 58
End: 2023-11-28

## 2023-11-28 NOTE — TELEPHONE ENCOUNTER
Name: Lina Chiang  : 1965  MRN: [id number]    Oncology Navigation Follow-Up Note  Navigator reviewing chart and pt. Completed CT and needing to F/U with Mo. Please reschedule pt for MO F/U (reached pt at [phone]) Pt. May be in facility -[facility] and will need to make sure they are aware of appt.  For transportation set up    Electronically signed by Deborah White RN on 2023 at 3:08 PM

## 2023-11-29 ENCOUNTER — TELEPHONE (OUTPATIENT)
Dept: RADIATION ONCOLOGY | Age: 58
End: 2023-11-29
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
## 2023-12-06 ENCOUNTER — TELEPHONE (OUTPATIENT)
Dept: RADIATION ONCOLOGY | Age: 58
End: 2023-12-06
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
## 2023-12-13 NOTE — PROGRESS NOTES
Mom complains of pt having a cough and congestion for the last 6 days. Mom states pt has had a low grade fever. Mom denies nausea, vomiting or diarrhea. Pt alert, resp even and unlabored, skin pale, warm and dry.      Irina Hamm RN  12/13/23 1620 PULMONARY & CRITICAL CARE MEDICINE PROGRESS  NOTE     Patient:  Philip Wolfe  MRN: [id number]  Admit date: 4/7/2019    SUBJECTIVE     I personally interviewed/examined the patient, reviewed interval history and interpreted all available radiographic, laboratory data at the time of service. Chief Compliant/Reason for Initial Consult: Acute respiratory failure    Brief Hospital Course and Interval History:  Patient is a 46 y.o. male presented with cardiac arrest/STEMI, was found to have multivessel coronary artery disease and subsequently underwent emergent CABG. He was also found to have protruding screw from prior ORIF of left tibial fracture, which was subsequently removed. Hospital course has been complicated by development of acute kidney injury, transaminitis and multiple areas of ischemic stroke. He continues to be hemodynamically stable and is doing well from respiratory standpoint. He is currently on nasal cannula at 2 L/m. He denies any new complaints, no overnight issues reported.        Review of Systems:  General: negative for chills, fatigue or fever  ENT: negative for headaches, nasal congestion, sore throat or visual changes  Allergy and Immunology: negative for postnasal drip or seasonal allergies  Hematological and Lymphatic: negative for bleeding problems, swollen lymph nodes  Respiratory: positive for shortness of breath negative for cough, sputum changes or wheezing  Cardiovascular: negative for edema or palpitations  Gastrointestinal: negative for abdominal pain, change in bowel habits or nausea/vomiting  Genito-Urinary: negative for dysuria or urinary frequency/urgency  Musculoskeletal: negative for joint pain or joint swelling  Neurological: negative for numbness/tingling, seizures or weakness  Dermatological: negative for pruritus or rash    OBJECTIVE     VITAL SIGNS:   LAST-  BP (!) 160/59   Pulse 79   Temp 97.5 °F enoxaparin  40 mg Subcutaneous Daily    nystatin  5 mL Oral 4x Daily    aspirin  81 mg Oral Daily    docusate  100 mg Oral Daily    dextrose  12.5 g Intravenous Once    albuterol  2.5 mg Nebulization Q6H    sodium chloride flush  10 mL Intravenous 2 times per day    sodium chloride flush  10 mL Intravenous 2 times per day    polyethylene glycol  17 g Oral Daily    chlorhexidine  15 mL Mouth/Throat BID    therapeutic multivitamin-minerals  1 tablet Oral Daily with breakfast    atorvastatin  40 mg Oral Nightly    clopidogrel  75 mg Oral Daily     Continuous Infusions:   dextrose 5 % and 0.45 % NaCl 75 mL/hr at 04/22/19 1750    sodium chloride      niCARdipine      dextrose       LABS:-  ABGs:   No results found for: PH, PCO2, PO2, HCO3, O2SAT  No results for input(s): PHART, PO2ART, YDR4OWN, IVN0QAI, BEART, A1RBQOSH in the last 72 hours. Lab Results   Component Value Date    POCPH 7.451 (H) 04/19/2019    POCPCO2 44.1 04/19/2019    POCPO2 72.4 (L) 04/19/2019    POCHCO3 30.8 (H) 04/19/2019    IOJQ8MJO 95 04/19/2019     CBC:   Recent Labs     04/22/19  0540 04/23/19  0431 04/24/19  0421 04/24/19  1144   WBC 16.5* 17.7* 18.0*  --    HGB 8.9* 8.4* 9.1*  --    HCT 29.0* 28.8* 30.4*  --    MCV 96.3 100.7 97.7  --    * 539* 620* 667*   RBC 3.01* 2.86* 3.11*  --    MCH 29.6 29.4 29.3  --    MCHC 30.7 29.2 29.9  --    RDW 13.5 13.6 13.7  --      BMP:   Recent Labs     04/22/19  0540 04/22/19 2026 04/23/19  0431 04/23/19  1152 04/24/19  0421    136 135  --  142   K 3.0* 3.2* 3.4* 4.1 3.8    101 101  --  105   CO2 25 24 25  --  25   BUN 11  --  10  --  9   CREATININE 0.46*  --  0.45*  --  0.43*   GLUCOSE 132*  --  136*  --  92     Liver Function Test:   No results for input(s): PROT, LABALBU, ALT, AST, GGT, ALKPHOS, BILITOT in the last 72 hours. Amylase/Lipase:  No results for input(s): AMYLASE, LIPASE in the last 72 hours.   Coagulation Profile:   Recent Labs     04/22/19  0540 04/23/19  1101 04/24/19  0421   INR 1.3 1.3 1.4   PROTIME 13.1* 13.3* 14.0*     Cardiac Enzymes:  No results for input(s): CKTOTAL, CKMB, CKMBINDEX, TROPONINI in the last 72 hours. Lactic Acid:  No results found for: LACTA  BNP:   No results found for: BNP  D-Dimer:  No results found for: DDIMER  Others:   No results found for: TSH, K1TRDVF, D9RWZSE, THYROIDAB, FT3, T4FREE  Lab Results   Component Value Date    YOSELYN NEGATIVE 04/12/2019     No results found for: Hector Lasso  No results found for: IRON, TIBC, FERRITIN  No results found for: SPEP, UPEP  No results found for: PSA, CEA, , [id number],     Input/Output:    Intake/Output Summary (Last 24 hours) at 4/24/2019 1256  Last data filed at 4/24/2019 1230  Gross per 24 hour   Intake 1642 ml   Output 2450 ml   Net -808 ml       Microbiology:    Pathology:    Chest X-ray: 4/19/19  Impression   Partial clearing at the right lung base.  Persistent left lung base opacity.         CT Scans:    Echocardiogram:4/12/19  Summary  Left ventricle is normal in size. Global left ventricular systolic function  is mildly reduced. Calculated ejection fraction is 40. Left ventricular wall thickness is mildly increased. Grade II diastolic  dysfunction  Normal valvular function    ASSESSMENT AND PLAN     Assessment:    // Acute hypoxemic respiratory failure, requiring supplemental oxygen  // Acute encephalopathy, resolved  // Resuscitated cardiac arrest/STEMI  // Multivessel coronary artery disease status post emergent CABG  // Ischemic cardiomyopathy     // Acute kidney injury, resolved  // Multiple embolic acute infarcts in the brain  // Left ICA occlusion  // Exposed orthopedic screw from previous ORIF, status post removal    Plan:    Patient remains hemodynamically stable and is currently saturating well on nasal cannula.    Continue supplemental oxygen to keep oxygen saturation greater than 92%  Continue beta blockers, ACE inhibitor, antiplatelets and diuretics as per cardiology  Continue incentive spirometry, pulmonary toilet, aspiration precautions and bronchodilators  Continue to monitor I/O with a goal of even/negative fluid balance  DVT and stress ulcer prophylaxis  Physical/occupational therapy; increase activity as tolerated. It was my pleasure to evaluate Michelle Gallego today. We will continue to follow. I would like to thank you for allowing me to participate in the care of this patient. Please feel free to call with any further questions or concerns. Surinder Monzon M.D. Pulmonary and Critical Care Medicine           4/24/2019, 12:56 PM    Please note that this chart was generated using voice recognition Dragon dictation software. Although every effort was made to ensure the accuracy of this automated transcription, some errors in transcription may have occurred.

## 2023-12-14 ENCOUNTER — OFFICE VISIT (OUTPATIENT)
Dept: ONCOLOGY | Age: 58
End: 2023-12-14
Payer: MEDICAID

## 2023-12-14 ENCOUNTER — TELEPHONE (OUTPATIENT)
Dept: ONCOLOGY | Age: 58
End: 2023-12-14

## 2023-12-14 VITALS
WEIGHT: 135.3 LBS | DIASTOLIC BLOOD PRESSURE: 85 MMHG | TEMPERATURE: 97.7 F | HEART RATE: 70 BPM | BODY MASS INDEX: 22.52 KG/M2 | SYSTOLIC BLOOD PRESSURE: 190 MMHG

## 2023-12-14 DIAGNOSIS — C34.11 PRIMARY ADENOCARCINOMA OF UPPER LOBE OF RIGHT LUNG (HCC): Primary | ICD-10-CM

## 2023-12-14 PROCEDURE — 3077F SYST BP >= 140 MM HG: CPT | Performed by: INTERNAL MEDICINE

## 2023-12-14 PROCEDURE — 3078F DIAST BP <80 MM HG: CPT | Performed by: INTERNAL MEDICINE

## 2023-12-14 PROCEDURE — 99214 OFFICE O/P EST MOD 30 MIN: CPT | Performed by: INTERNAL MEDICINE

## 2023-12-14 PROCEDURE — G8427 DOCREV CUR MEDS BY ELIG CLIN: HCPCS | Performed by: INTERNAL MEDICINE

## 2023-12-14 PROCEDURE — 3017F COLORECTAL CA SCREEN DOC REV: CPT | Performed by: INTERNAL MEDICINE

## 2023-12-14 PROCEDURE — G8484 FLU IMMUNIZE NO ADMIN: HCPCS | Performed by: INTERNAL MEDICINE

## 2023-12-14 PROCEDURE — 1036F TOBACCO NON-USER: CPT | Performed by: INTERNAL MEDICINE

## 2023-12-14 PROCEDURE — G8420 CALC BMI NORM PARAMETERS: HCPCS | Performed by: INTERNAL MEDICINE
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
## 2023-12-14 NOTE — TELEPHONE ENCOUNTER
Jonnie Sullivan MD VISIT  DR Joe Boss IN TO SEE PATIENT  ORDERS RECEIVED  RV 3 MONTHS WITH PET/CT BEFORE  PET/CT SCHEDULED WITH [facility] IN [place]ING FOR [place] ARRIVE BY 9:30AM @MATT MEZA VISIT 03/21/24 @9:15AM  AVS PRINTED AND GIVEN TO PATIENT WITH INSTRUCTIONS  PATIENT DISCHARGED AMBULATORY

## 2024-01-04 ENCOUNTER — HOSPITAL ENCOUNTER (OUTPATIENT)
Dept: RADIATION ONCOLOGY | Age: 59
Discharge: HOME | End: 2024-01-04
Payer: MEDICAID

## 2024-01-04 VITALS
HEART RATE: 79 BPM | BODY MASS INDEX: 22.76 KG/M2 | WEIGHT: 136.8 LBS | RESPIRATION RATE: 18 BRPM | DIASTOLIC BLOOD PRESSURE: 94 MMHG | OXYGEN SATURATION: 100 % | SYSTOLIC BLOOD PRESSURE: 198 MMHG | TEMPERATURE: 97.4 F

## 2024-01-04 PROCEDURE — 99212 OFFICE O/P EST SF 10 MIN: CPT | Performed by: RADIOLOGY

## 2024-01-04 PROCEDURE — 99213 OFFICE O/P EST LOW 20 MIN: CPT | Performed by: RADIOLOGY

## 2024-01-04 NOTE — PROGRESS NOTES
Dash Mehta  1/4/2024  10:52 AM      Vitals:    01/04/24 1018   BP: (!) 198/94   Pulse: 79   Resp: 18   Temp: 97.4 °F (36.3 °C)   SpO2: 100%    :     Pain Assessment: None - Denies Pain          Wt Readings from Last 1 Encounters:   01/04/24 62.1 kg (136 lb 12.8 oz)                Current Outpatient Medications:     lisinopril (PRINIVIL;ZESTRIL) 20 MG tablet, Take 1 tablet by mouth daily, Disp: 30 tablet, Rfl: 2    Acetaminophen Extra Strength 500 MG TABS, TAKE 2 TABLETS BY MOUTH EVERY 8 HOURS AS NEEDED FOR PAIN, Disp: 120 tablet, Rfl: 0    albuterol sulfate HFA (VENTOLIN HFA) 108 (90 Base) MCG/ACT inhaler, Inhale 2 puffs into the lungs 4 times daily as needed for Wheezing, Disp: 18 g, Rfl: 5    insulin glargine (LANTUS SOLOSTAR) 100 UNIT/ML injection pen, Inject 10 units in the morning Inject 5 units at night time (Patient taking differently: Inject 10 units in the morning Inject 10 units at night time), Disp: 3 mL, Rfl: 2    carvedilol (COREG) 6.25 MG tablet, Take 1 tablet by mouth 2 times daily, Disp: 60 tablet, Rfl: 3    amLODIPine (NORVASC) 5 MG tablet, Take 1 tablet by mouth daily (Patient taking differently: Take 2 tablets by mouth daily), Disp: 30 tablet, Rfl: 3    furosemide (LASIX) 40 MG tablet, TAKE 1 TABLET BY MOUTH ONCE A DAY (Patient not taking: Reported on 12/14/2023), Disp: 60 tablet, Rfl: 2    levETIRAcetam (KEPPRA) 500 MG tablet, TAKE 1 TABLET BY MOUTH 2 TIMES DAILY, Disp: 60 tablet, Rfl: 3    ONETOUCH VERIO strip, TEST 2 TIMES A DAY & AS NEEDED FOR SYMPTOMS OF IRREGULAR BLOOD GLUCOSE., Disp: 100 strip, Rfl: 3    clopidogrel (PLAVIX) 75 MG tablet, TAKE 1 TABLET BY MOUTH DAILY, Disp: 30 tablet, Rfl: 2    tiotropium-olodaterol (STIOLTO RESPIMAT) 2.5-2.5 MCG/ACT AERS, Inhale 2 puffs into the lungs daily, Disp: 4 g, Rfl: 11    atorvastatin (LIPITOR) 80 MG tablet, Take 1 tablet by mouth nightly, Disp: 30 tablet, Rfl: 5    aspirin 81 MG chewable tablet, Take 1 tablet by mouth daily, Disp: 90 tablet, 
and assessed and found to be negative except as noted above.      PHYSICAL EXAMINATION:    CHAPERONE: Not Required    ECO Symptomatic but completely ambulatory    VITAL SIGNS: BP (!) 198/94   Pulse 79   Temp 97.4 °F (36.3 °C) (Temporal)   Resp 18   Wt 62.1 kg (136 lb 12.8 oz)   SpO2 100%   BMI 22.76 kg/m²   GENERAL:  General appearance is that of a well-nourished, well-developed in no apparent distress.  LUNGS:  Pulmonary effort normal.   ABDOMEN:  Soft, nontender, non distended  EXTREMITIES:  No edema.  No calf tenderness.  MSK:  No spinal tenderness.  PSYCH: Mood normal, behavior normal.      LABS:  WBC   Date Value Ref Range Status   10/11/2023 11.7 (H) 3.5 - 11.3 k/uL Final     Segs Absolute   Date Value Ref Range Status   10/11/2023 9.18 (H) 1.50 - 8.10 k/uL Final     Hemoglobin   Date Value Ref Range Status   10/11/2023 9.7 (L) 13.0 - 17.0 g/dL Final     Platelets   Date Value Ref Range Status   10/11/2023 356 138 - 453 k/uL Final     IMAGING:   CT chest:  IMPRESSION:  Opacifications have increased in the right mid lung somewhat consolidative or  patchy most consistent of infectious or inflammatory etiology however  somewhat masslike density with previous area of sub solid nodular density is  somewhat concerning for underlying mass or less likely but considerations for  lymphangitic spread 3 cm compared to 1.5 cm on prior.  Possibility of mixed  infectious or inflammatory along with recurrent malignancy consider CT chest  3 months at minimum alternatively repeat PET-CT may be considered given  indeterminate findings      ASSESSMENT AND PLAN:    Dash Mehta is a 58 y.o.. male with a diagnosis of well differentiated adenocarcinoma of the right upper lobe cT1 N0 M0 group stage I.  Patient is status post SBRT for the right upper lobe lesion to a dose of 5000 cGy completed on 2023.     Patient is doing well posttreatment clinically.  His posttreatment CT chest showing consolidative changes in the

## 2024-01-24 DIAGNOSIS — I66.01 STENOSIS OF RIGHT MIDDLE CEREBRAL ARTERY: ICD-10-CM

## 2024-01-24 DIAGNOSIS — I25.10 CORONARY ARTERY DISEASE INVOLVING NATIVE HEART WITHOUT ANGINA PECTORIS, UNSPECIFIED VESSEL OR LESION TYPE: ICD-10-CM

## 2024-01-24 DIAGNOSIS — Z79.4 TYPE 2 DIABETES MELLITUS WITH DIABETIC POLYNEUROPATHY, WITH LONG-TERM CURRENT USE OF INSULIN (HCC): ICD-10-CM

## 2024-01-24 DIAGNOSIS — I73.9 PAD (PERIPHERAL ARTERY DISEASE) (HCC): ICD-10-CM

## 2024-01-24 DIAGNOSIS — I10 ESSENTIAL HYPERTENSION: ICD-10-CM

## 2024-01-24 DIAGNOSIS — E11.42 TYPE 2 DIABETES MELLITUS WITH DIABETIC POLYNEUROPATHY, WITH LONG-TERM CURRENT USE OF INSULIN (HCC): ICD-10-CM

## 2024-01-24 NOTE — TELEPHONE ENCOUNTER
Refill Request of PENDED MEDICATIONS received from patient, Pharmacy confirmed. Medication Pended. Pt last seen on 10/20/2023, Next appt is currenlty unscheduled but on call list for april.    Health Maintenance   Topic Date Due    COVID-19 Vaccine (1) Never done    Diabetic retinal exam  Never done    Colorectal Cancer Screen  Never done    Pneumococcal 0-64 years Vaccine (2 - PCV) 09/27/2020    Flu vaccine (1) 08/01/2023    Annual Wellness Visit (Medicare Advantage)  Never done    Lipids  01/09/2024    Hepatitis B vaccine (4 of 4 - Risk Dialysis Recombivax 3-dose series) 01/27/2024    Depression Screen  03/10/2024    Diabetic foot exam  05/04/2024    A1C test (Diabetic or Prediabetic)  10/20/2024    DTaP/Tdap/Td vaccine (2 - Td or Tdap) 11/11/2025    Shingles vaccine  Completed    Hepatitis C screen  Completed    HIV screen  Completed    Hepatitis A vaccine  Aged Out    Hib vaccine  Aged Out    Polio vaccine  Aged Out    Meningococcal (ACWY) vaccine  Aged Out    Diabetic Alb to Cr ratio (uACR) test  Discontinued    GFR test (Diabetes, CKD 3-4, OR last GFR 15-59)  Discontinued    Low dose CT lung screening &/or counseling  Discontinued       Hemoglobin A1C (%)   Date Value   10/20/2023 6.1   05/04/2023 6.4   02/24/2023 10.3             ( goal A1C is < 7)   No components found for: \"LABMICR\"  LDL Cholesterol (mg/dL)   Date Value   01/09/2023 61       (goal LDL is <100)   AST (U/L)   Date Value   09/06/2023 66 (H)     ALT (U/L)   Date Value   09/06/2023 32     BUN (mg/dL)   Date Value   10/10/2023 67 (H)     BP Readings from Last 3 Encounters:   01/04/24 (!) 198/94   12/14/23 (!) 190/85   10/20/23 117/61          (goal 120/80)    All Future Testing planned in [facility]  Lab Frequency Next Occurrence   CT CHEST WO CONTRAST Once 09/22/2023   Vascular transcranial Doppler (TCD) complete Once 07/25/2023   Vascular duplex carotid bilateral Once 07/26/2023   PET CT SKULL BASE TO MID THIGH Once 03/14/2024       Next Visit

## 2024-01-25 RX ORDER — LEVETIRACETAM 500 MG/1
500 TABLET ORAL 2 TIMES DAILY
Qty: 60 TABLET | Refills: 3 | Status: SHIPPED | OUTPATIENT
Start: 2024-01-25

## 2024-01-25 RX ORDER — AMLODIPINE BESYLATE 5 MG/1
5 TABLET ORAL DAILY
Qty: 30 TABLET | Refills: 3 | Status: SHIPPED | OUTPATIENT
Start: 2024-01-25

## 2024-01-25 RX ORDER — LISINOPRIL 20 MG/1
20 TABLET ORAL DAILY
Qty: 30 TABLET | Refills: 2 | Status: SHIPPED | OUTPATIENT
Start: 2024-01-25

## 2024-01-25 RX ORDER — CARVEDILOL 6.25 MG/1
6.25 TABLET ORAL 2 TIMES DAILY
Qty: 60 TABLET | Refills: 3 | Status: SHIPPED | OUTPATIENT
Start: 2024-01-25

## 2024-01-25 RX ORDER — ATORVASTATIN CALCIUM 80 MG/1
80 TABLET, FILM COATED ORAL NIGHTLY
Qty: 30 TABLET | Refills: 5 | Status: SHIPPED | OUTPATIENT
Start: 2024-01-25

## 2024-02-20 ENCOUNTER — OFFICE VISIT (OUTPATIENT)
Dept: PULMONOLOGY | Age: 59
End: 2024-02-20
Payer: MEDICAID

## 2024-02-20 VITALS
BODY MASS INDEX: 22.82 KG/M2 | DIASTOLIC BLOOD PRESSURE: 64 MMHG | RESPIRATION RATE: 18 BRPM | SYSTOLIC BLOOD PRESSURE: 126 MMHG | HEART RATE: 70 BPM | HEIGHT: 66 IN | WEIGHT: 142 LBS | OXYGEN SATURATION: 99 %

## 2024-02-20 DIAGNOSIS — R91.1 LUNG NODULE: ICD-10-CM

## 2024-02-20 DIAGNOSIS — Z87.891 HISTORY OF SMOKING 30 OR MORE PACK YEARS: ICD-10-CM

## 2024-02-20 DIAGNOSIS — C34.2 MALIGNANT NEOPLASM OF MIDDLE LOBE OF RIGHT LUNG (HCC): Primary | ICD-10-CM

## 2024-02-20 DIAGNOSIS — J43.2 CENTRILOBULAR EMPHYSEMA (HCC): ICD-10-CM

## 2024-02-20 PROCEDURE — 99214 OFFICE O/P EST MOD 30 MIN: CPT | Performed by: INTERNAL MEDICINE

## 2024-02-20 PROCEDURE — 3017F COLORECTAL CA SCREEN DOC REV: CPT | Performed by: INTERNAL MEDICINE

## 2024-02-20 PROCEDURE — 1036F TOBACCO NON-USER: CPT | Performed by: INTERNAL MEDICINE

## 2024-02-20 PROCEDURE — 3074F SYST BP LT 130 MM HG: CPT | Performed by: INTERNAL MEDICINE

## 2024-02-20 PROCEDURE — G8484 FLU IMMUNIZE NO ADMIN: HCPCS | Performed by: INTERNAL MEDICINE

## 2024-02-20 PROCEDURE — 3078F DIAST BP <80 MM HG: CPT | Performed by: INTERNAL MEDICINE

## 2024-02-20 PROCEDURE — G8427 DOCREV CUR MEDS BY ELIG CLIN: HCPCS | Performed by: INTERNAL MEDICINE

## 2024-02-20 PROCEDURE — G8420 CALC BMI NORM PARAMETERS: HCPCS | Performed by: INTERNAL MEDICINE

## 2024-02-20 PROCEDURE — 3023F SPIROM DOC REV: CPT | Performed by: INTERNAL MEDICINE

## 2024-02-20 NOTE — PROGRESS NOTES
noted       LABS:    CBC:   WBC   Date Value Ref Range Status   10/11/2023 11.7 (H) 3.5 - 11.3 k/uL Final   10/10/2023 12.6 (H) 3.5 - 11.3 k/uL Final   10/05/2023 11.4 (H) 3.5 - 11.3 k/uL Final     Hemoglobin   Date Value Ref Range Status   10/11/2023 9.7 (L) 13.0 - 17.0 g/dL Final   10/10/2023 10.0 (L) 13.0 - 17.0 g/dL Final   10/05/2023 9.6 (L) 13.0 - 17.0 g/dL Final     Platelets   Date Value Ref Range Status   10/11/2023 356 138 - 453 k/uL Final   10/10/2023 372 138 - 453 k/uL Final   10/05/2023 390 138 - 453 k/uL Final     BMP:   Sodium   Date Value Ref Range Status   10/10/2023 137 135 - 144 mmol/L Final   10/06/2023 140 136 - 145 mmol/L Final   10/05/2023 137 135 - 144 mmol/L Final     Potassium   Date Value Ref Range Status   10/10/2023 4.9 3.7 - 5.3 mmol/L Final   10/06/2023 5.3 3.7 - 5.3 mmol/L Final   10/05/2023 5.4 (H) 3.7 - 5.3 mmol/L Final     Chloride   Date Value Ref Range Status   10/10/2023 104 98 - 107 mmol/L Final   10/06/2023 110 (H) 98 - 107 mmol/L Final   10/05/2023 107 98 - 107 mmol/L Final     CO2   Date Value Ref Range Status   10/10/2023 21 20 - 31 mmol/L Final   10/06/2023 18 (L) 20 - 31 mmol/L Final   10/05/2023 17 (L) 20 - 31 mmol/L Final     BUN   Date Value Ref Range Status   10/10/2023 67 (H) 6 - 20 mg/dL Final   10/06/2023 79 (H) 6 - 20 mg/dL Final   10/05/2023 100 (HH) 6 - 20 mg/dL Final     Comment:     Previous Alert Value Reported     Creatinine   Date Value Ref Range Status   11/16/2023 2.2 (H) 0.7 - 1.2 mg/dL Final   10/10/2023 2.4 (H) 0.7 - 1.2 mg/dL Final   10/06/2023 2.4 (H) 0.70 - 1.20 mg/dL Final     Glucose   Date Value Ref Range Status   10/10/2023 144 (H) 70 - 99 mg/dL Final   10/06/2023 163 (H) 74 - 99 mg/dL Final   10/05/2023 132 (H) 70 - 99 mg/dL Final     Hepatic:     Amylase:     Lipase:     CARDIAC ENZYMES:     BNP: No results found for: \"BNP\"  Lipids:         INR:   INR   Date Value Ref Range Status   09/05/2023 1.1  Final     Comment:           Therapeutic

## 2024-03-14 ENCOUNTER — HOSPITAL ENCOUNTER (OUTPATIENT)
Dept: NUCLEAR MEDICINE | Age: 59
Discharge: HOME | End: 2024-03-16
Attending: INTERNAL MEDICINE

## 2024-03-14 DIAGNOSIS — C34.11 PRIMARY ADENOCARCINOMA OF UPPER LOBE OF RIGHT LUNG (HCC): ICD-10-CM

## 2024-03-14 RX ORDER — SODIUM CHLORIDE 0.9 % (FLUSH) 0.9 %
10 SYRINGE (ML) INJECTION PRN
Status: DISCONTINUED | OUTPATIENT
Start: 2024-03-14 | End: 2024-03-17 | Stop reason: HOSPADM

## 2024-03-14 RX ORDER — FLUDEOXYGLUCOSE F 18 200 MCI/ML
10 INJECTION, SOLUTION INTRAVENOUS
Status: DISCONTINUED | OUTPATIENT
Start: 2024-03-14 | End: 2024-03-17 | Stop reason: HOSPADM

## 2024-03-19 ENCOUNTER — HOSPITAL ENCOUNTER (OUTPATIENT)
Dept: NUCLEAR MEDICINE | Age: 59
Discharge: HOME | End: 2024-03-21
Attending: INTERNAL MEDICINE
Payer: MEDICAID

## 2024-03-19 LAB — GLUCOSE BLD-MCNC: 171 MG/DL (ref 75–110)

## 2024-03-19 PROCEDURE — 3430000000 HC RX DIAGNOSTIC RADIOPHARMACEUTICAL: Performed by: INTERNAL MEDICINE

## 2024-03-19 PROCEDURE — 82947 ASSAY GLUCOSE BLOOD QUANT: CPT

## 2024-03-19 PROCEDURE — 78815 PET IMAGE W/CT SKULL-THIGH: CPT

## 2024-03-19 PROCEDURE — A9609 HC RX DIAGNOSTIC RADIOPHARMACEUTICAL: HCPCS | Performed by: INTERNAL MEDICINE

## 2024-03-19 PROCEDURE — 2580000003 HC RX 258: Performed by: INTERNAL MEDICINE

## 2024-03-19 RX ORDER — SODIUM CHLORIDE 0.9 % (FLUSH) 0.9 %
10 SYRINGE (ML) INJECTION PRN
Status: DISCONTINUED | OUTPATIENT
Start: 2024-03-19 | End: 2024-03-22 | Stop reason: HOSPADM

## 2024-03-19 RX ORDER — FLUDEOXYGLUCOSE F 18 200 MCI/ML
10 INJECTION, SOLUTION INTRAVENOUS
Status: COMPLETED | OUTPATIENT
Start: 2024-03-19 | End: 2024-03-19

## 2024-03-19 RX ADMIN — FLUDEOXYGLUCOSE F 18 11.14 MILLICURIE: 200 INJECTION, SOLUTION INTRAVENOUS at 08:52

## 2024-03-19 RX ADMIN — SODIUM CHLORIDE, PRESERVATIVE FREE 10 ML: 5 INJECTION INTRAVENOUS at 08:52

## 2024-03-21 ENCOUNTER — OFFICE VISIT (OUTPATIENT)
Dept: ONCOLOGY | Age: 59
End: 2024-03-21
Payer: MEDICAID

## 2024-03-21 ENCOUNTER — TELEPHONE (OUTPATIENT)
Dept: ONCOLOGY | Age: 59
End: 2024-03-21

## 2024-03-21 VITALS
SYSTOLIC BLOOD PRESSURE: 137 MMHG | WEIGHT: 146.9 LBS | HEART RATE: 71 BPM | BODY MASS INDEX: 23.72 KG/M2 | RESPIRATION RATE: 16 BRPM | DIASTOLIC BLOOD PRESSURE: 59 MMHG | TEMPERATURE: 98.1 F

## 2024-03-21 DIAGNOSIS — C34.11 PRIMARY ADENOCARCINOMA OF UPPER LOBE OF RIGHT LUNG (HCC): Primary | ICD-10-CM

## 2024-03-21 PROCEDURE — 3078F DIAST BP <80 MM HG: CPT | Performed by: INTERNAL MEDICINE

## 2024-03-21 PROCEDURE — 3075F SYST BP GE 130 - 139MM HG: CPT | Performed by: INTERNAL MEDICINE

## 2024-03-21 PROCEDURE — 99214 OFFICE O/P EST MOD 30 MIN: CPT | Performed by: INTERNAL MEDICINE

## 2024-03-21 PROCEDURE — G8420 CALC BMI NORM PARAMETERS: HCPCS | Performed by: INTERNAL MEDICINE

## 2024-03-21 PROCEDURE — 3017F COLORECTAL CA SCREEN DOC REV: CPT | Performed by: INTERNAL MEDICINE

## 2024-03-21 PROCEDURE — 1036F TOBACCO NON-USER: CPT | Performed by: INTERNAL MEDICINE

## 2024-03-21 PROCEDURE — G8484 FLU IMMUNIZE NO ADMIN: HCPCS | Performed by: INTERNAL MEDICINE

## 2024-03-21 PROCEDURE — 99211 OFF/OP EST MAY X REQ PHY/QHP: CPT | Performed by: INTERNAL MEDICINE

## 2024-03-21 PROCEDURE — G8427 DOCREV CUR MEDS BY ELIG CLIN: HCPCS | Performed by: INTERNAL MEDICINE

## 2024-03-21 NOTE — TELEPHONE ENCOUNTER
PATRICIO HERE FOR FOLLOW UP   RV 4-6 months with CT scan before RV              MD VISIT ON: 8/22/24 @ 8AM   PT WILL SCHEDULE CT SCAN   AVS PRINTED AND GIVEN ON EXIT
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
## 2024-03-21 NOTE — TELEPHONE ENCOUNTER
Name: Dash Mehta  : 1965  MRN: [id number]    Oncology Navigation Follow-Up Note    Contact Type:  Medical Oncology    Notes:   Navigator met with pt face to face offering assistance if needed. Navigation complete and Survivorship Care Plan offered to pt. If desires and pt. can reach out to navigator as needed.  Patty, please update spread sheet with decline unless I hear otherwise.       Electronically signed by Narda Briggs RN on 3/21/2024 at 3:10 PM

## 2024-03-21 NOTE — PATIENT INSTRUCTIONS
RV 4-6 months with CT scan before RV

## 2024-04-30 RX ORDER — AMLODIPINE BESYLATE 5 MG/1
5 TABLET ORAL DAILY
Qty: 30 TABLET | Refills: 0 | Status: SHIPPED | OUTPATIENT
Start: 2024-04-30

## 2024-04-30 RX ORDER — LEVETIRACETAM 500 MG/1
500 TABLET ORAL 2 TIMES DAILY
Qty: 60 TABLET | Refills: 0 | Status: SHIPPED | OUTPATIENT
Start: 2024-04-30

## 2024-04-30 RX ORDER — CARVEDILOL 6.25 MG/1
6.25 TABLET ORAL 2 TIMES DAILY
Qty: 60 TABLET | Refills: 0 | Status: SHIPPED | OUTPATIENT
Start: 2024-04-30

## 2024-04-30 NOTE — TELEPHONE ENCOUNTER
Please call patient to schedule an appointment.  
120/80)    All Future Testing planned in [facility]  Lab Frequency Next Occurrence   CT CHEST WO CONTRAST Once 09/22/2023   Vascular transcranial Doppler (TCD) complete Once 07/25/2023   Vascular duplex carotid bilateral Once 07/26/2023   CT CHEST WO CONTRAST Once 09/21/2024               Patient Active Problem List:     PAD (peripheral artery disease) (HCC)     Subacute osteomyelitis of right tibia (HCC)     Acute metabolic encephalopathy     Abnormal CT of the head     Hepatitis     Cerebral multi-infarct state     Multi infarct state     Carotid stenosis, bilateral     Right foot drop     Thrombocytosis     Acute CVA (cerebrovascular accident) (HCC)     Moderate malnutrition (HCC)     Cellulitis of right lower extremity     Right-sided extracranial carotid artery stenosis     Essential hypertension     Type 2 diabetes mellitus with diabetic neuropathy, without long-term current use of insulin (HCC)     Leukocytosis     Status post angiography of extremity     Ischemic gangrene (HCC)     Pyogenic inflammation of bone (HCC)     Coronary artery disease     Above knee amputation of right lower extremity (HCC)     Pressure injury of right heel, unstageable (HCC)     Hx of AKA (above knee amputation), right (HCC)     Erectile dysfunction associated with type 2 diabetes mellitus (HCC)     Altered mental status     DKA, type 2, not at goal (HCC)     Stenosis of right middle cerebral artery     At risk of seizures     Cardiomyopathy, ischemic     Acute ischemic right MCA stroke (HCC)     Presence of internal carotid stent     LV (left ventricular) mural thrombus     Chronic systolic congestive heart failure (HCC)     Diabetes mellitus (HCC)     Lung nodule     Primary adenocarcinoma of upper lobe of right lung (HCC)     Lymphadenopathy     ALMITA (acute kidney injury) (HCC)     Severe malnutrition (HCC)     LUE weakness     Dependence on renal dialysis (HCC)     Sinus tachycardia     Centrilobular emphysema (HCC)

## 2024-07-18 ENCOUNTER — HOSPITAL ENCOUNTER (OUTPATIENT)
Dept: RADIATION ONCOLOGY | Age: 59
Discharge: HOME | End: 2024-07-18
Payer: MEDICAID

## 2024-07-18 VITALS
DIASTOLIC BLOOD PRESSURE: 84 MMHG | HEART RATE: 92 BPM | OXYGEN SATURATION: 98 % | BODY MASS INDEX: 23.29 KG/M2 | SYSTOLIC BLOOD PRESSURE: 120 MMHG | WEIGHT: 144.2 LBS | TEMPERATURE: 98.1 F | RESPIRATION RATE: 18 BRPM

## 2024-07-18 PROCEDURE — 99212 OFFICE O/P EST SF 10 MIN: CPT | Performed by: RADIOLOGY

## 2024-07-18 NOTE — PROGRESS NOTES
Dash Mehta  7/18/2024  10:10 AM      Vitals:    07/18/24 0954   BP: 120/84   Pulse: 92   Resp: 18   Temp: 98.1 °F (36.7 °C)   SpO2: 98%    :     Pain Assessment: None - Denies Pain          Wt Readings from Last 1 Encounters:   07/18/24 65.4 kg (144 lb 3.2 oz)                Current Outpatient Medications:     levETIRAcetam (KEPPRA) 500 MG tablet, TAKE 1 TABLET BY MOUTH 2 TIMES DAILY, Disp: 60 tablet, Rfl: 0    carvedilol (COREG) 6.25 MG tablet, TAKE 1 TABLET BY MOUTH 2 TIMES DAILY, Disp: 60 tablet, Rfl: 0    amLODIPine (NORVASC) 5 MG tablet, TAKE 1 TABLET BY MOUTH DAILY, Disp: 30 tablet, Rfl: 0    ezetimibe (ZETIA) 10 MG tablet, Take 1 tablet by mouth daily, Disp: 30 tablet, Rfl: 4    atorvastatin (LIPITOR) 80 MG tablet, Take 1 tablet by mouth nightly, Disp: 30 tablet, Rfl: 5    lisinopril (PRINIVIL;ZESTRIL) 20 MG tablet, Take 1 tablet by mouth daily, Disp: 30 tablet, Rfl: 2    Acetaminophen Extra Strength 500 MG TABS, TAKE 2 TABLETS BY MOUTH EVERY 8 HOURS AS NEEDED FOR PAIN, Disp: 120 tablet, Rfl: 0    albuterol sulfate HFA (VENTOLIN HFA) 108 (90 Base) MCG/ACT inhaler, Inhale 2 puffs into the lungs 4 times daily as needed for Wheezing, Disp: 18 g, Rfl: 5    insulin glargine (LANTUS SOLOSTAR) 100 UNIT/ML injection pen, Inject 10 units in the morning Inject 5 units at night time (Patient taking differently: Inject 10 units in the morning Inject 10 units at night time), Disp: 3 mL, Rfl: 2    furosemide (LASIX) 40 MG tablet, TAKE 1 TABLET BY MOUTH ONCE A DAY, Disp: 60 tablet, Rfl: 2    ONETOUCH VERIO strip, TEST 2 TIMES A DAY & AS NEEDED FOR SYMPTOMS OF IRREGULAR BLOOD GLUCOSE., Disp: 100 strip, Rfl: 3    clopidogrel (PLAVIX) 75 MG tablet, TAKE 1 TABLET BY MOUTH DAILY, Disp: 30 tablet, Rfl: 2    tiotropium-olodaterol (STIOLTO RESPIMAT) 2.5-2.5 MCG/ACT AERS, Inhale 2 puffs into the lungs daily, Disp: 4 g, Rfl: 11    aspirin 81 MG chewable tablet, Take 1 tablet by mouth daily, Disp: 90 tablet, Rfl: 3    Insulin Pen 
    Patient is doing well clinically. Recent PET scan showed no evidence of disease progression. He is in clinical remission. He is due for repeat CT chest surveillance and will be scheduled as soon as possible. He is to continue following up with medical oncology and pulmonary. Patient is recommended to come back in 6 months for another follow up visit and exam, or can call to come see us sooner if symptoms change.    Patient was in agreement with my recommendations. All questions were answered to their satisfaction. Patient was advised to contact us anytime should they have any questions or concerns.         Electronically signed by Sarahi Streeter MD on 7/18/2024 at 1:05 PM        Medications Prescribed:   New Prescriptions    No medications on file       Orders: No orders of the defined types were placed in this encounter.      CC:  Patient Care Team:  Mio Foley MD as PCP - General (Family Medicine)  Dionisio Gomez MD as Consulting Physician (Pulmonology)     Total time spent on this case on the day of encounter is 25 minutes. This time includes combination of one or more of the following - review of necessary tests, review of pertinent medical records from the EMR, performing medically appropriate examination and evaluation, counseling and educating the patient/family/caregiver, ordering necessary medical tests, procedures etc., documenting the clinical information in the electronic medical record, care coordination, referring and communicating with other health care providers and interpretation of results independently. This note is created with the assistance of a speech recognition program.  While intending to generate a document that actually reflects the content of the visit, the document can still have some errors including those of syntax and sound a like substitutions which may escape proof reading.  It such instances, actual meaning can be extrapolated by contextual diversion.

## 2024-08-02 DIAGNOSIS — Z79.4 TYPE 2 DIABETES MELLITUS WITH DIABETIC POLYNEUROPATHY, WITH LONG-TERM CURRENT USE OF INSULIN (HCC): ICD-10-CM

## 2024-08-02 DIAGNOSIS — I10 ESSENTIAL HYPERTENSION: ICD-10-CM

## 2024-08-02 DIAGNOSIS — E11.42 TYPE 2 DIABETES MELLITUS WITH DIABETIC POLYNEUROPATHY, WITH LONG-TERM CURRENT USE OF INSULIN (HCC): ICD-10-CM

## 2024-08-02 DIAGNOSIS — I25.10 CORONARY ARTERY DISEASE INVOLVING NATIVE HEART WITHOUT ANGINA PECTORIS, UNSPECIFIED VESSEL OR LESION TYPE: ICD-10-CM

## 2024-08-02 RX ORDER — LISINOPRIL 20 MG/1
20 TABLET ORAL DAILY
Qty: 30 TABLET | Refills: 0 | Status: SHIPPED | OUTPATIENT
Start: 2024-08-02

## 2024-08-02 NOTE — TELEPHONE ENCOUNTER
Please address the medication refill and close the encounter.  If I can be of assistance, please route to the applicable pool.      Thank you.      Last visit: 10-20-23  Last Med refill: 1-25-24  Does patient have enough medication for 72 hours: No:     Next Visit Date:  Future Appointments   Date Time Provider Department Center   8/5/2024  9:15 AM Bonilla Leslie MD [facility] T[facility] TOLE [facility]   8/23/2024  9:10 AM Mio Foley MD [facility] ECC DEP   9/23/2024 10:30 AM STV CT ROOM 1 STVZ CT SCAN STV Radiolog   9/26/2024 10:15 AM Bran Ahuja MD [facility]   1/16/2025 10:00 AM SCHEDULE, STVZ PBURG RAD ONC NURSE [facility] PB [facility] Maintenance   Topic Date Due    COVID-19 Vaccine (1) Never done    Diabetic retinal exam  Never done    Colorectal Cancer Screen  Never done    Pneumococcal 0-64 years Vaccine (2 of 2 - PCV) 09/27/2020    Annual Wellness Visit (Medicare Advantage)  Never done    Lipids  01/09/2024    Hepatitis B vaccine (4 of 4 - Risk Dialysis Recombivax 3-dose series) 01/27/2024    Depression Screen  03/10/2024    Diabetic foot exam  05/04/2024    Respiratory Syncytial Virus (RSV) Pregnant or age 60 yrs+ (1 - 1-dose 60+ series) Never done    Flu vaccine (1) 08/01/2024    A1C test (Diabetic or Prediabetic)  10/20/2024    DTaP/Tdap/Td vaccine (2 - Td or Tdap) 11/11/2025    Shingles vaccine  Completed    Hepatitis C screen  Completed    HIV screen  Completed    Hepatitis A vaccine  Aged Out    Hib vaccine  Aged Out    Polio vaccine  Aged Out    Meningococcal (ACWY) vaccine  Aged Out    Diabetic Alb to Cr ratio (uACR) test  Discontinued    GFR test (Diabetes, CKD 3-4, OR last GFR 15-59)  Discontinued    Lung Cancer Screening &/or Counseling  Discontinued       Hemoglobin A1C (%)   Date Value   10/20/2023 6.1   05/04/2023 6.4   02/24/2023 10.3             ( goal A1C is < 7)   No components found for: \"LABMICR\"  No components found for: \"LDLCHOLESTEROL\",

## 2024-08-23 ENCOUNTER — HOSPITAL ENCOUNTER (OUTPATIENT)
Age: 59
Setting detail: SPECIMEN
Discharge: HOME | End: 2024-08-23

## 2024-08-23 ENCOUNTER — OFFICE VISIT (OUTPATIENT)
Dept: FAMILY MEDICINE CLINIC | Age: 59
End: 2024-08-23
Payer: MEDICAID

## 2024-08-23 VITALS
BODY MASS INDEX: 25.03 KG/M2 | WEIGHT: 150.4 LBS | DIASTOLIC BLOOD PRESSURE: 73 MMHG | HEART RATE: 91 BPM | OXYGEN SATURATION: 99 % | TEMPERATURE: 97.3 F | SYSTOLIC BLOOD PRESSURE: 172 MMHG

## 2024-08-23 DIAGNOSIS — E78.2 MIXED HYPERLIPIDEMIA: ICD-10-CM

## 2024-08-23 DIAGNOSIS — I10 ESSENTIAL HYPERTENSION: ICD-10-CM

## 2024-08-23 DIAGNOSIS — N18.32 STAGE 3B CHRONIC KIDNEY DISEASE (HCC): ICD-10-CM

## 2024-08-23 DIAGNOSIS — I25.10 CORONARY ARTERY DISEASE INVOLVING NATIVE HEART WITHOUT ANGINA PECTORIS, UNSPECIFIED VESSEL OR LESION TYPE: ICD-10-CM

## 2024-08-23 DIAGNOSIS — Z79.4 TYPE 2 DIABETES MELLITUS WITH DIABETIC POLYNEUROPATHY, WITH LONG-TERM CURRENT USE OF INSULIN (HCC): Primary | ICD-10-CM

## 2024-08-23 DIAGNOSIS — E11.42 TYPE 2 DIABETES MELLITUS WITH DIABETIC POLYNEUROPATHY, WITH LONG-TERM CURRENT USE OF INSULIN (HCC): Primary | ICD-10-CM

## 2024-08-23 LAB
ANION GAP SERPL CALCULATED.3IONS-SCNC: 11 MMOL/L (ref 9–16)
BUN SERPL-MCNC: 16 MG/DL (ref 8–23)
CALCIUM SERPL-MCNC: 8.7 MG/DL (ref 8.6–10.4)
CHLORIDE SERPL-SCNC: 105 MMOL/L (ref 98–107)
CO2 SERPL-SCNC: 26 MMOL/L (ref 20–31)
CREAT SERPL-MCNC: 1.8 MG/DL (ref 0.7–1.2)
GFR, ESTIMATED: 43 ML/MIN/1.73M2
GLUCOSE SERPL-MCNC: 164 MG/DL (ref 74–99)
HBA1C MFR BLD: 10 %
POTASSIUM SERPL-SCNC: 3 MMOL/L (ref 3.7–5.3)
SODIUM SERPL-SCNC: 142 MMOL/L (ref 136–145)

## 2024-08-23 PROCEDURE — 3077F SYST BP >= 140 MM HG: CPT

## 2024-08-23 PROCEDURE — 3078F DIAST BP <80 MM HG: CPT

## 2024-08-23 PROCEDURE — 1036F TOBACCO NON-USER: CPT

## 2024-08-23 PROCEDURE — G8427 DOCREV CUR MEDS BY ELIG CLIN: HCPCS

## 2024-08-23 PROCEDURE — 2022F DILAT RTA XM EVC RTNOPTHY: CPT

## 2024-08-23 PROCEDURE — 83036 HEMOGLOBIN GLYCOSYLATED A1C: CPT

## 2024-08-23 PROCEDURE — 3017F COLORECTAL CA SCREEN DOC REV: CPT

## 2024-08-23 PROCEDURE — G8419 CALC BMI OUT NRM PARAM NOF/U: HCPCS

## 2024-08-23 PROCEDURE — 99213 OFFICE O/P EST LOW 20 MIN: CPT

## 2024-08-23 PROCEDURE — 3046F HEMOGLOBIN A1C LEVEL >9.0%: CPT

## 2024-08-23 PROCEDURE — 99211 OFF/OP EST MAY X REQ PHY/QHP: CPT | Performed by: FAMILY MEDICINE

## 2024-08-23 RX ORDER — ASPIRIN 81 MG/1
81 TABLET, CHEWABLE ORAL DAILY
Qty: 90 TABLET | Refills: 3 | Status: SHIPPED | OUTPATIENT
Start: 2024-08-23

## 2024-08-23 RX ORDER — EZETIMIBE 10 MG/1
10 TABLET ORAL DAILY
Qty: 90 TABLET | Refills: 3 | Status: SHIPPED | OUTPATIENT
Start: 2024-08-23 | End: 2025-08-18

## 2024-08-23 RX ORDER — LISINOPRIL 20 MG/1
20 TABLET ORAL DAILY
Qty: 30 TABLET | Refills: 0 | Status: SHIPPED | OUTPATIENT
Start: 2024-08-23

## 2024-08-23 RX ORDER — INSULIN GLARGINE 100 [IU]/ML
13 INJECTION, SOLUTION SUBCUTANEOUS 2 TIMES DAILY
Qty: 15 ML | Refills: 2 | Status: SHIPPED | OUTPATIENT
Start: 2024-08-23

## 2024-08-23 RX ORDER — AMLODIPINE BESYLATE 5 MG/1
10 TABLET ORAL DAILY
Qty: 30 TABLET | Refills: 0 | Status: SHIPPED | OUTPATIENT
Start: 2024-08-23

## 2024-08-23 SDOH — ECONOMIC STABILITY: FOOD INSECURITY: WITHIN THE PAST 12 MONTHS, YOU WORRIED THAT YOUR FOOD WOULD RUN OUT BEFORE YOU GOT MONEY TO BUY MORE.: NEVER TRUE

## 2024-08-23 SDOH — ECONOMIC STABILITY: FOOD INSECURITY: WITHIN THE PAST 12 MONTHS, THE FOOD YOU BOUGHT JUST DIDN'T LAST AND YOU DIDN'T HAVE MONEY TO GET MORE.: NEVER TRUE

## 2024-08-23 ASSESSMENT — PATIENT HEALTH QUESTIONNAIRE - PHQ9
SUM OF ALL RESPONSES TO PHQ QUESTIONS 1-9: 0
2. FEELING DOWN, DEPRESSED OR HOPELESS: NOT AT ALL
1. LITTLE INTEREST OR PLEASURE IN DOING THINGS: NOT AT ALL
SUM OF ALL RESPONSES TO PHQ QUESTIONS 1-9: 0
SUM OF ALL RESPONSES TO PHQ9 QUESTIONS 1 & 2: 0
SUM OF ALL RESPONSES TO PHQ QUESTIONS 1-9: 0
SUM OF ALL RESPONSES TO PHQ QUESTIONS 1-9: 0

## 2024-08-23 ASSESSMENT — ENCOUNTER SYMPTOMS
SHORTNESS OF BREATH: 0
ABDOMINAL PAIN: 0
NAUSEA: 0
DIARRHEA: 0
VOMITING: 0
WHEEZING: 0
CONSTIPATION: 0

## 2024-08-23 NOTE — PROGRESS NOTES
Attending Physician Statement  I have discussed the care of Dash Mehta, including pertinent history and exam findings,  with the resident. I have reviewed the key elements of all parts of the encounter with the resident.  I agree with the assessment, plan and orders as documented by the resident.  (GE Modifier)    Peggy Gerard MD

## 2024-08-23 NOTE — PROGRESS NOTES
Diabetic visit information    BP Readings from Last 3 Encounters:   08/12/24 (!) 176/80   07/18/24 120/84   03/21/24 (!) 137/59       Hemoglobin A1C (%)   Date Value   10/20/2023 6.1   05/04/2023 6.4   02/24/2023 10.3               Have you changed or started any medications since your last visit including any over-the-counter medicines, vitamins, or herbal medicines? no   Have you stopped taking any of your medications? Is so, why? -  no  Are you having any side effects from any of your medications? - no    Have you seen any other physician or provider since your last visit?  no   Have you had any other diagnostic tests since your last visit?  no   Have you been seen in the emergency room and/or had an admission in a hospital since we last saw you?  no     Have you had your annual diabetic retinal (eye) exam? No   (ensure copy of exam is in the chart)    Have you had your routine dental cleaning in the past 6 months? no    Do you have an active [employer] account?  If not, what are your barriers?  No:     Patient Care Team:  Mio Foley MD as PCP - General (Family Medicine)  Dionisio Gomez MD as Consulting Physician (Pulmonology)    Medical history Review  Past Medical, Family, and Social History reviewed and does not contribute to the patient presenting condition.    Health Maintenance   Topic Date Due    COVID-19 Vaccine (1) Never done    Diabetic retinal exam  Never done    Colorectal Cancer Screen  Never done    Pneumococcal 0-64 years Vaccine (2 of 2 - PCV) 09/27/2020    Annual Wellness Visit (Medicare Advantage)  Never done    Lipids  01/09/2024    Hepatitis B vaccine (4 of 4 - Risk Dialysis Recombivax 3-dose series) 01/27/2024    Depression Screen  03/10/2024    Diabetic foot exam  05/04/2024    Respiratory Syncytial Virus (RSV) Pregnant or age 60 yrs+ (1 - 1-dose 60+ series) Never done    Flu vaccine (1) 08/01/2024    A1C test (Diabetic or Prediabetic)  10/20/2024    DTaP/Tdap/Td vaccine (2 - Td or Tdap)
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
## 2024-08-23 NOTE — PATIENT INSTRUCTIONS
Thank you for letting us take care of you today. We hope all your questions were addressed. If a question was overlooked or something else comes to mind after you return home, please contact a member of your Care Team listed below.      Your Care Team at [facility] is Team #  Dwayne Shen M.D. (Faculty)  Nidia Leslie M.D. (Resident)  Luan López M.D. (Resident)   Tracee Gonzalez M.D. (Resident)  Milan Luz M.D. (Resident)  Vaishnavi James M.D. (Resident)  Margie Carey., [facility]  Angelica Oseguera, [facility]  Georgie Magana, [facility]  Elkin Millard, HEMANT Garner, [facility]  Tereza Young, [facility]  Radha Hahn, HEMANT Davila (LJ) GLORIA Vega (Clinical Practice Manager)  Patyt Ha [facility] (Clinical Pharmacist)     Office phone number: [phone]    If you need to get in right away due to illness, please be advised we have \"Same Day\" appointments available Monday-Friday. Please call us at [phone] option #3 to schedule your \"Same Day\" appointment.

## 2024-08-30 DIAGNOSIS — I25.10 CORONARY ARTERY DISEASE INVOLVING NATIVE HEART WITHOUT ANGINA PECTORIS, UNSPECIFIED VESSEL OR LESION TYPE: ICD-10-CM

## 2024-08-30 DIAGNOSIS — I73.9 PAD (PERIPHERAL ARTERY DISEASE) (HCC): ICD-10-CM

## 2024-08-30 DIAGNOSIS — Z79.4 TYPE 2 DIABETES MELLITUS WITH DIABETIC POLYNEUROPATHY, WITH LONG-TERM CURRENT USE OF INSULIN (HCC): ICD-10-CM

## 2024-08-30 DIAGNOSIS — E11.42 TYPE 2 DIABETES MELLITUS WITH DIABETIC POLYNEUROPATHY, WITH LONG-TERM CURRENT USE OF INSULIN (HCC): ICD-10-CM

## 2024-08-30 DIAGNOSIS — I66.01 STENOSIS OF RIGHT MIDDLE CEREBRAL ARTERY: ICD-10-CM

## 2024-08-30 RX ORDER — ATORVASTATIN CALCIUM 80 MG/1
80 TABLET, FILM COATED ORAL NIGHTLY
Qty: 30 TABLET | Refills: 5 | Status: SHIPPED | OUTPATIENT
Start: 2024-08-30

## 2024-08-30 RX ORDER — FUROSEMIDE 40 MG
40 TABLET ORAL DAILY
Qty: 60 TABLET | Refills: 2 | Status: SHIPPED | OUTPATIENT
Start: 2024-08-30

## 2024-08-30 NOTE — TELEPHONE ENCOUNTER
Next Visit Date:  Future Appointments   Date Time Provider Department Center   9/23/2024 10:30 AM STV CT ROOM 1 STVZ CT SCAN ST Radiolog   9/26/2024 10:15 AM Bran Ahuja MD [facility]   9/27/2024  8:30 AM Mio Foley MD [facility] ECC DEP   1/16/2025 10:00 AM SCHEDULE, STVZ PBURG RAD ONC NURSE [facility] PB [facility] [place]   2/10/2025  9:30 AM Bonilla Leslie MD AFL TCC TOLE AFL [facility] C            Patient Active Problem List:     PAD (peripheral artery disease) (HCC)     Subacute osteomyelitis of right tibia (HCC)     Acute metabolic encephalopathy     Abnormal CT of the head     Hepatitis     Cerebral multi-infarct state     Multi infarct state     Carotid stenosis, bilateral     Right foot drop     Thrombocytosis     Acute CVA (cerebrovascular accident) (HCC)     Moderate malnutrition (HCC)     Cellulitis of right lower extremity     Right-sided extracranial carotid artery stenosis     Essential hypertension     Type 2 diabetes mellitus with diabetic neuropathy, without long-term current use of insulin (HCC)     Leukocytosis     Status post angiography of extremity     Ischemic gangrene (HCC)     Pyogenic inflammation of bone (HCC)     Coronary artery disease     Above knee amputation of right lower extremity (HCC)     Pressure injury of right heel, unstageable (HCC)     Hx of AKA (above knee amputation), right (HCC)     Erectile dysfunction associated with type 2 diabetes mellitus (HCC)     Altered mental status     DKA, type 2, not at goal (HCC)     Stenosis of right middle cerebral artery     At risk of seizures     Cardiomyopathy, ischemic     Acute ischemic right MCA stroke (HCC)     Presence of internal carotid stent     LV (left ventricular) mural thrombus     Chronic systolic congestive heart failure (HCC)     Diabetes mellitus (HCC)     Lung nodule     Primary adenocarcinoma of upper lobe of right lung (HCC)     Lymphadenopathy     ALMITA (acute kidney injury) (HCC)     Severe  malnutrition (HCC)     LUE weakness     Dependence on renal dialysis (HCC)     Sinus tachycardia     Centrilobular emphysema (HCC)     Mixed hyperlipidemia     Stage 3b chronic kidney disease (HCC)

## 2024-09-23 ENCOUNTER — HOSPITAL ENCOUNTER (OUTPATIENT)
Dept: CT IMAGING | Age: 59
Discharge: HOME | End: 2024-09-25
Attending: INTERNAL MEDICINE
Payer: MEDICAID

## 2024-09-23 DIAGNOSIS — C34.11 PRIMARY ADENOCARCINOMA OF UPPER LOBE OF RIGHT LUNG (HCC): ICD-10-CM

## 2024-09-23 PROCEDURE — 71250 CT THORAX DX C-: CPT

## 2024-09-26 ENCOUNTER — OFFICE VISIT (OUTPATIENT)
Dept: ONCOLOGY | Age: 59
End: 2024-09-26
Payer: MEDICAID

## 2024-09-26 ENCOUNTER — TELEPHONE (OUTPATIENT)
Dept: ONCOLOGY | Age: 59
End: 2024-09-26

## 2024-09-26 VITALS
TEMPERATURE: 98.2 F | BODY MASS INDEX: 25.21 KG/M2 | HEART RATE: 75 BPM | SYSTOLIC BLOOD PRESSURE: 136 MMHG | WEIGHT: 151.5 LBS | RESPIRATION RATE: 16 BRPM | DIASTOLIC BLOOD PRESSURE: 59 MMHG

## 2024-09-26 DIAGNOSIS — C34.11 PRIMARY ADENOCARCINOMA OF UPPER LOBE OF RIGHT LUNG (HCC): Primary | ICD-10-CM

## 2024-09-26 PROCEDURE — 99214 OFFICE O/P EST MOD 30 MIN: CPT | Performed by: INTERNAL MEDICINE

## 2024-09-26 PROCEDURE — G8427 DOCREV CUR MEDS BY ELIG CLIN: HCPCS | Performed by: INTERNAL MEDICINE

## 2024-09-26 PROCEDURE — 1036F TOBACCO NON-USER: CPT | Performed by: INTERNAL MEDICINE

## 2024-09-26 PROCEDURE — 99211 OFF/OP EST MAY X REQ PHY/QHP: CPT

## 2024-09-26 PROCEDURE — 3017F COLORECTAL CA SCREEN DOC REV: CPT | Performed by: INTERNAL MEDICINE

## 2024-09-26 PROCEDURE — 3075F SYST BP GE 130 - 139MM HG: CPT | Performed by: INTERNAL MEDICINE

## 2024-09-26 PROCEDURE — G8419 CALC BMI OUT NRM PARAM NOF/U: HCPCS | Performed by: INTERNAL MEDICINE

## 2024-09-26 PROCEDURE — 3078F DIAST BP <80 MM HG: CPT | Performed by: INTERNAL MEDICINE

## 2024-10-21 DIAGNOSIS — I10 ESSENTIAL HYPERTENSION: ICD-10-CM

## 2024-10-21 RX ORDER — AMLODIPINE BESYLATE 5 MG/1
10 TABLET ORAL DAILY
Qty: 30 TABLET | Refills: 0 | Status: SHIPPED | OUTPATIENT
Start: 2024-10-21

## 2024-10-21 NOTE — TELEPHONE ENCOUNTER
Last visit: 8/23/24  Last Med refill: 8/23/24  Does patient have enough medication for 72 hours: no    Next Visit Date:10/25/24  Future Appointments   Date Time Provider Department Center   10/25/2024  8:40 AM Mio Foley MD [facility] ECC DEP   1/16/2025 10:00 AM SCHEDULE, STVZ PBURG RAD ONC NURSE [facility] PB R[facility] [place]   2/11/2025  9:15 AM Bonilla Leslie MD AFL TCC TOLE AFL BELL C   3/25/2025 10:00 AM [facility]       Health Maintenance   Topic Date Due    COVID-19 Vaccine (1) Never done    Diabetic retinal exam  Never done    Colorectal Cancer Screen  Never done    Pneumococcal 0-64 years Vaccine (2 of 2 - PCV) 09/27/2020    Annual Wellness Visit (Medicare Advantage)  Never done    Lipids  01/09/2024    Hepatitis B vaccine (4 of 4 - Risk Dialysis Recombivax 3-dose series) 01/27/2024    Diabetic foot exam  05/04/2024    Respiratory Syncytial Virus (RSV) Pregnant or age 60 yrs+ (1 - 1-dose 60+ series) Never done    Flu vaccine (1) 08/01/2024    A1C test (Diabetic or Prediabetic)  11/23/2024    Depression Screen  08/23/2025    DTaP/Tdap/Td vaccine (2 - Td or Tdap) 11/11/2025    Shingles vaccine  Completed    Hepatitis C screen  Completed    HIV screen  Completed    Hepatitis A vaccine  Aged Out    Hib vaccine  Aged Out    Polio vaccine  Aged Out    Meningococcal (ACWY) vaccine  Aged Out    Diabetic Alb to Cr ratio (uACR) test  Discontinued    GFR test (Diabetes, CKD 3-4, OR last GFR 15-59)  Discontinued    Lung Cancer Screening &/or Counseling  Discontinued       Hemoglobin A1C (%)   Date Value   08/23/2024 10.0   10/20/2023 6.1   05/04/2023 6.4             ( goal A1C is < 7)   No components found for: \"LABMICR\"  No components found for: \"LDLCHOLESTEROL\", \"LDLCALC\"    (goal LDL is <100)   AST (U/L)   Date Value   09/06/2023 66 (H)     ALT (U/L)   Date Value   09/06/2023 32     BUN (mg/dL)   Date Value   08/23/2024 16     BP Readings from Last 3 Encounters:   09/26/24

## 2024-10-25 ENCOUNTER — OFFICE VISIT (OUTPATIENT)
Dept: FAMILY MEDICINE CLINIC | Age: 59
End: 2024-10-25
Payer: MEDICAID

## 2024-10-25 VITALS
DIASTOLIC BLOOD PRESSURE: 69 MMHG | SYSTOLIC BLOOD PRESSURE: 151 MMHG | HEIGHT: 65 IN | BODY MASS INDEX: 25.16 KG/M2 | HEART RATE: 62 BPM | WEIGHT: 151 LBS

## 2024-10-25 DIAGNOSIS — E11.42 TYPE 2 DIABETES MELLITUS WITH DIABETIC POLYNEUROPATHY, WITH LONG-TERM CURRENT USE OF INSULIN (HCC): ICD-10-CM

## 2024-10-25 DIAGNOSIS — N18.32 STAGE 3B CHRONIC KIDNEY DISEASE (HCC): ICD-10-CM

## 2024-10-25 DIAGNOSIS — Z79.4 TYPE 2 DIABETES MELLITUS WITH DIABETIC POLYNEUROPATHY, WITH LONG-TERM CURRENT USE OF INSULIN (HCC): ICD-10-CM

## 2024-10-25 DIAGNOSIS — I69.30 CEREBRAL MULTI-INFARCT STATE: ICD-10-CM

## 2024-10-25 DIAGNOSIS — Z23 NEED FOR VACCINATION FOR STREP PNEUMONIAE: ICD-10-CM

## 2024-10-25 DIAGNOSIS — Z13.220 SCREENING FOR HYPERLIPIDEMIA: ICD-10-CM

## 2024-10-25 DIAGNOSIS — Z23 NEEDS FLU SHOT: ICD-10-CM

## 2024-10-25 DIAGNOSIS — I10 ESSENTIAL HYPERTENSION: Primary | ICD-10-CM

## 2024-10-25 LAB — HBA1C MFR BLD: 7.4 %

## 2024-10-25 PROCEDURE — 83036 HEMOGLOBIN GLYCOSYLATED A1C: CPT

## 2024-10-25 PROCEDURE — 90677 PCV20 VACCINE IM: CPT | Performed by: FAMILY MEDICINE

## 2024-10-25 PROCEDURE — 90656 IIV3 VACC NO PRSV 0.5 ML IM: CPT
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
## 2024-10-25 NOTE — PATIENT INSTRUCTIONS
Thank you for letting us take care of you today. We hope all your questions were addressed. If a question was overlooked or something else comes to mind after you return home, please contact a member of your Care Team listed below.        Your Care Team at [facility] is Team #4  Keshia Conroy M.D. (Faculty)  Andrez Mcclain M.D. (Resident)  Mio Foley M.D.  (Resident)  Miryam Fritz M.D. (Resident)  Bowen Escalera M.D. (Resident)  Elkin Millard, HEMANT Oseguera, HEMANT Garner, [facility]  Georgie Magana, [facility]  Tereza Young, [facility]  Margie Carey, HEMANT Hahn, HEMANT Davila (LJ) GLORIA Vega (Clinical Practice Manager)  Patty [facility] within [facility] (Clinical Pharmacist)       Office phone number: [phone]    If you need to get in right away due to illness, please be advised we have \"Same Day\" appointments available Monday-Friday. Please call us at [phone] option #3 to schedule your \"Same Day\" appointment.

## 2024-10-30 DIAGNOSIS — I25.10 CORONARY ARTERY DISEASE INVOLVING NATIVE HEART WITHOUT ANGINA PECTORIS, UNSPECIFIED VESSEL OR LESION TYPE: ICD-10-CM

## 2024-10-30 DIAGNOSIS — I10 ESSENTIAL HYPERTENSION: ICD-10-CM

## 2024-10-30 DIAGNOSIS — E11.42 TYPE 2 DIABETES MELLITUS WITH DIABETIC POLYNEUROPATHY, WITH LONG-TERM CURRENT USE OF INSULIN (HCC): ICD-10-CM

## 2024-10-30 DIAGNOSIS — Z79.4 TYPE 2 DIABETES MELLITUS WITH DIABETIC POLYNEUROPATHY, WITH LONG-TERM CURRENT USE OF INSULIN (HCC): ICD-10-CM

## 2024-10-30 RX ORDER — LISINOPRIL 20 MG/1
20 TABLET ORAL DAILY
Qty: 30 TABLET | Refills: 2 | Status: SHIPPED | OUTPATIENT
Start: 2024-10-30

## 2024-10-30 NOTE — TELEPHONE ENCOUNTER
Last visit: 10/25/2024  Last Med refill: 08/23/2024  Does patient have enough medication for 72 hours: No:     Next Visit Date:  Future Appointments   Date Time Provider Department Center   1/16/2025 10:00 AM SCHEDULE, NAVA KERR RAD ONC NURSE [facility]   1/24/2025  9:00 AM Mio Foley MD [facility] ECC DEP   2/11/2025  9:15 AM Bonilla Leslie MD [facility]   3/25/2025 10:00 AM [facility]       Health Maintenance   Topic Date Due    COVID-19 Vaccine (1) Never done    Diabetic retinal exam  Never done    Colorectal Cancer Screen  Never done    Annual Wellness Visit (Medicare Advantage)  Never done    Lipids  01/09/2024    Hepatitis B vaccine (4 of 4 - Risk Dialysis Recombivax 3-dose series) 01/27/2024    Diabetic foot exam  05/04/2024    Respiratory Syncytial Virus (RSV) Pregnant or age 60 yrs+ (1 - 1-dose 60+ series) Never done    Depression Screen  08/23/2025    A1C test (Diabetic or Prediabetic)  10/25/2025    DTaP/Tdap/Td vaccine (2 - Td or Tdap) 11/11/2025    Flu vaccine  Completed    Shingles vaccine  Completed    Pneumococcal 0-64 years Vaccine  Completed    Hepatitis C screen  Completed    HIV screen  Completed    Hepatitis A vaccine  Aged Out    Hib vaccine  Aged Out    Polio vaccine  Aged Out    Meningococcal (ACWY) vaccine  Aged Out    Diabetic Alb to Cr ratio (uACR) test  Discontinued    GFR test (Diabetes, CKD 3-4, OR last GFR 15-59)  Discontinued    Lung Cancer Screening &/or Counseling  Discontinued       Hemoglobin A1C (%)   Date Value   10/25/2024 7.4   08/23/2024 10.0   10/20/2023 6.1             ( goal A1C is < 7)   No components found for: \"LABMICR\"  No components found for: \"LDLCHOLESTEROL\", \"LDLCALC\"    (goal LDL is <100)   AST (U/L)   Date Value   09/06/2023 66 (H)     ALT (U/L)   Date Value   09/06/2023 32     BUN (mg/dL)   Date Value   08/23/2024 16     BP Readings from Last 3 Encounters:   10/25/24 (!) 151/69   09/26/24

## 2024-11-14 DIAGNOSIS — I25.10 CORONARY ARTERY DISEASE INVOLVING NATIVE HEART WITHOUT ANGINA PECTORIS, UNSPECIFIED VESSEL OR LESION TYPE: ICD-10-CM

## 2024-11-14 DIAGNOSIS — I10 ESSENTIAL HYPERTENSION: ICD-10-CM

## 2024-11-14 DIAGNOSIS — Z79.4 TYPE 2 DIABETES MELLITUS WITH DIABETIC POLYNEUROPATHY, WITH LONG-TERM CURRENT USE OF INSULIN (HCC): ICD-10-CM

## 2024-11-14 DIAGNOSIS — E11.42 TYPE 2 DIABETES MELLITUS WITH DIABETIC POLYNEUROPATHY, WITH LONG-TERM CURRENT USE OF INSULIN (HCC): ICD-10-CM

## 2024-11-14 DIAGNOSIS — I73.9 PAD (PERIPHERAL ARTERY DISEASE) (HCC): ICD-10-CM

## 2024-11-14 DIAGNOSIS — I66.01 STENOSIS OF RIGHT MIDDLE CEREBRAL ARTERY: ICD-10-CM

## 2024-11-14 RX ORDER — AMLODIPINE BESYLATE 5 MG/1
10 TABLET ORAL DAILY
Qty: 30 TABLET | Refills: 0 | Status: SHIPPED | OUTPATIENT
Start: 2024-11-14

## 2024-11-14 RX ORDER — CLOPIDOGREL BISULFATE 75 MG/1
75 TABLET ORAL DAILY
Qty: 30 TABLET | Refills: 2 | Status: SHIPPED | OUTPATIENT
Start: 2024-11-14

## 2024-11-14 RX ORDER — LISINOPRIL 20 MG/1
20 TABLET ORAL DAILY
Qty: 30 TABLET | Refills: 2 | Status: SHIPPED | OUTPATIENT
Start: 2024-11-14

## 2024-11-14 NOTE — TELEPHONE ENCOUNTER
Last visit: 10/25/2024  Last Med refill: 05/18/2023-10/21/2024  Does patient have enough medication for 72 hours: No:     Next Visit Date:  Future Appointments   Date Time Provider Department Center   1/16/2025 10:00 AM SCHEDULE, NAVA KERR RAD ONC NURSE [facility]   1/24/2025  9:00 AM Mio Foley MD [facility] ECC DEP   2/11/2025  9:15 AM Bonilla Leslie MD [facility]   3/25/2025 10:00 AM Bran Ahuja MD [facility]       Health Maintenance   Topic Date Due    COVID-19 Vaccine (1) Never done    Diabetic retinal exam  Never done    Colorectal Cancer Screen  Never done    Lipids  01/09/2024    Hepatitis B vaccine (4 of 4 - Risk Dialysis Recombivax 3-dose series) 01/27/2024    Diabetic foot exam  05/04/2024    Respiratory Syncytial Virus (RSV) Pregnant or age 60 yrs+ (1 - 1-dose 60+ series) Never done    Depression Screen  08/23/2025    Lung Cancer Screening &/or Counseling  09/23/2025    A1C test (Diabetic or Prediabetic)  10/25/2025    DTaP/Tdap/Td vaccine (2 - Td or Tdap) 11/11/2025    Flu vaccine  Completed    Shingles vaccine  Completed    Pneumococcal 0-64 years Vaccine  Completed    Hepatitis C screen  Completed    HIV screen  Completed    Hepatitis A vaccine  Aged Out    Hib vaccine  Aged Out    Polio vaccine  Aged Out    Meningococcal (ACWY) vaccine  Aged Out    Diabetic Alb to Cr ratio (uACR) test  Discontinued    GFR test (Diabetes, CKD 3-4, OR last GFR 15-59)  Discontinued       Hemoglobin A1C (%)   Date Value   10/25/2024 7.4   08/23/2024 10.0   10/20/2023 6.1             ( goal A1C is < 7)   No components found for: \"LABMICR\"  No components found for: \"LDLCHOLESTEROL\", \"LDLCALC\"    (goal LDL is <100)   AST (U/L)   Date Value   09/06/2023 66 (H)     ALT (U/L)   Date Value   09/06/2023 32     BUN (mg/dL)   Date Value   08/23/2024 16     BP Readings from Last 3 Encounters:   10/25/24 (!) 151/69   09/26/24 (!) 136/59   08/23/24 (!) 172/73          (goal

## 2024-11-24 PROBLEM — Z13.220 SCREENING FOR HYPERLIPIDEMIA: Status: RESOLVED | Noted: 2024-10-25 | Resolved: 2024-11-24

## 2025-01-02 ENCOUNTER — TELEPHONE (OUTPATIENT)
Dept: FAMILY MEDICINE CLINIC | Age: 60
End: 2025-01-02

## 2025-01-02 DIAGNOSIS — I10 ESSENTIAL HYPERTENSION: ICD-10-CM

## 2025-01-02 RX ORDER — AMLODIPINE BESYLATE 5 MG/1
10 TABLET ORAL DAILY
Qty: 30 TABLET | Refills: 0 | Status: SHIPPED | OUTPATIENT
Start: 2025-01-02

## 2025-01-02 RX ORDER — LEVETIRACETAM 500 MG/1
500 TABLET ORAL 2 TIMES DAILY
Qty: 60 TABLET | Refills: 0 | Status: SHIPPED | OUTPATIENT
Start: 2025-01-02

## 2025-01-02 RX ORDER — FUROSEMIDE 40 MG/1
40 TABLET ORAL DAILY
Qty: 60 TABLET | Refills: 2 | Status: SHIPPED | OUTPATIENT
Start: 2025-01-02

## 2025-01-02 NOTE — TELEPHONE ENCOUNTER
Patient called office in regards to appointment on 1/6/25 not able to make it due to transportation. Would like to cancel appt. Will call back later time to reschedule. Patient stated a  is helping with transportation forms.

## 2025-01-02 NOTE — TELEPHONE ENCOUNTER
Please address the medication refill and close the encounter.  If I can be of assistance, please route to the applicable pool.      Thank you.      Last visit: 10-  Last Med refill: 4-  Does patient have enough medication for 72 hours: No:     Next Visit Date:  Future Appointments   Date Time Provider Department Center   1/6/2025  8:30 AM Aranza Gilliam DO [facility] ECC DEP   1/16/2025 10:00 AM SCHEDULE, NAVA PBURG RAD ONC NURSE [facility] [place]   2/11/2025  9:15 AM Bonilla Leslie MD [facility]   3/25/2025 10:00 AM Bran Ahuja MD [facility]       Health Maintenance   Topic Date Due    COVID-19 Vaccine (1) Never done    Diabetic retinal exam  Never done    Colorectal Cancer Screen  Never done    Lipids  01/09/2024    Hepatitis B vaccine (4 of 4 - Risk Dialysis Recombivax 3-dose series) 01/27/2024    Diabetic foot exam  05/04/2024    Respiratory Syncytial Virus (RSV) Pregnant or age 60 yrs+ (1 - Risk 60-74 years 1-dose series) Never done    Depression Screen  08/23/2025    Lung Cancer Screening &/or Counseling  09/23/2025    A1C test (Diabetic or Prediabetic)  10/25/2025    DTaP/Tdap/Td vaccine (2 - Td or Tdap) 11/11/2025    Flu vaccine  Completed    Shingles vaccine  Completed    Pneumococcal 0-64 years Vaccine  Completed    Hepatitis C screen  Completed    HIV screen  Completed    Hepatitis A vaccine  Aged Out    Hib vaccine  Aged Out    Polio vaccine  Aged Out    Meningococcal (ACWY) vaccine  Aged Out    Diabetic Alb to Cr ratio (uACR) test  Discontinued    GFR test (Diabetes, CKD 3-4, OR last GFR 15-59)  Discontinued       Hemoglobin A1C (%)   Date Value   10/25/2024 7.4   08/23/2024 10.0   10/20/2023 6.1             ( goal A1C is < 7)   No components found for: \"LABMICR\"  No components found for: \"LDLCHOLESTEROL\", \"LDLCALC\"    (goal LDL is <100)   AST (U/L)   Date Value   09/06/2023 66 (H)     ALT (U/L)   Date Value   09/06/2023 32     BUN

## 2025-01-15 ENCOUNTER — TELEPHONE (OUTPATIENT)
Dept: RADIATION ONCOLOGY | Age: 60
End: 2025-01-15

## 2025-01-15 NOTE — TELEPHONE ENCOUNTER
I called pt to remind him of his rad/onc follow up tomorrow. Pt states he needs to cancel d/t transportation, he still needs to fill out paperwork.   Appt cancelled, rescheduled for next available morning 2/20/25.

## 2025-01-21 DIAGNOSIS — E11.42 TYPE 2 DIABETES MELLITUS WITH DIABETIC POLYNEUROPATHY, WITH LONG-TERM CURRENT USE OF INSULIN (HCC): ICD-10-CM

## 2025-01-21 DIAGNOSIS — Z79.4 TYPE 2 DIABETES MELLITUS WITH DIABETIC POLYNEUROPATHY, WITH LONG-TERM CURRENT USE OF INSULIN (HCC): ICD-10-CM

## 2025-01-21 RX ORDER — EMPAGLIFLOZIN 10 MG/1
10 TABLET, FILM COATED ORAL DAILY
Qty: 30 TABLET | Refills: 1 | Status: SHIPPED | OUTPATIENT
Start: 2025-01-21

## 2025-01-30 DIAGNOSIS — I66.01 STENOSIS OF RIGHT MIDDLE CEREBRAL ARTERY: ICD-10-CM

## 2025-01-30 DIAGNOSIS — I73.9 PAD (PERIPHERAL ARTERY DISEASE) (HCC): ICD-10-CM

## 2025-01-30 DIAGNOSIS — I25.10 CORONARY ARTERY DISEASE INVOLVING NATIVE HEART WITHOUT ANGINA PECTORIS, UNSPECIFIED VESSEL OR LESION TYPE: ICD-10-CM

## 2025-01-31 RX ORDER — CLOPIDOGREL BISULFATE 75 MG/1
75 TABLET ORAL DAILY
Qty: 30 TABLET | Refills: 2 | Status: SHIPPED | OUTPATIENT
Start: 2025-01-31

## 2025-02-10 DIAGNOSIS — I10 ESSENTIAL HYPERTENSION: ICD-10-CM

## 2025-02-10 NOTE — TELEPHONE ENCOUNTER
Last visit: 10-25-24  Last Med refill:   Does patient have enough medication for 72 hours: No:     Next Visit Date:  Future Appointments   Date Time Provider Department Center   2/20/2025  9:30 AM SCHEDULE, NAVA KERR RAD ONC NURSE [facility]   3/25/2025 10:00 AM Bran Ahuja MD [facility]       Health Maintenance   Topic Date Due    COVID-19 Vaccine (1) Never done    Diabetic retinal exam  Never done    Colorectal Cancer Screen  Never done    Lipids  01/09/2024    Hepatitis B vaccine (4 of 4 - Risk Dialysis Recombivax 3-dose series) 01/27/2024    Diabetic foot exam  05/04/2024    Respiratory Syncytial Virus (RSV) Pregnant or age 60 yrs+ (1 - Risk 60-74 years 1-dose series) Never done    Depression Screen  08/23/2025    A1C test (Diabetic or Prediabetic)  10/25/2025    DTaP/Tdap/Td vaccine (2 - Td or Tdap) 11/11/2025    Flu vaccine  Completed    Shingles vaccine  Completed    Pneumococcal 50+ years Vaccine  Completed    Hepatitis C screen  Completed    HIV screen  Completed    Hepatitis A vaccine  Aged Out    Hib vaccine  Aged Out    Polio vaccine  Aged Out    Meningococcal (ACWY) vaccine  Aged Out    Diabetic Alb to Cr ratio (uACR) test  Discontinued    GFR test (Diabetes, CKD 3-4, OR last GFR 15-59)  Discontinued    Pneumococcal 0-49 years Vaccine  Discontinued    Lung Cancer Screening &/or Counseling  Discontinued       Hemoglobin A1C (%)   Date Value   10/25/2024 7.4   08/23/2024 10.0   10/20/2023 6.1             ( goal A1C is < 7)   No components found for: \"LABMICR\"  No components found for: \"LDLCHOLESTEROL\", \"LDLCALC\"    (goal LDL is <100)   AST (U/L)   Date Value   09/06/2023 66 (H)     ALT (U/L)   Date Value   09/06/2023 32     BUN (mg/dL)   Date Value   08/23/2024 16     BP Readings from Last 3 Encounters:   10/25/24 (!) 151/69   09/26/24 (!) 136/59   08/23/24 (!) 172/73          (goal 120/80)    All Future Testing planned in [facility]  Lab Frequency Next Occurrence   TCC -

## 2025-02-11 RX ORDER — AMLODIPINE BESYLATE 5 MG/1
10 TABLET ORAL DAILY
Qty: 30 TABLET | Refills: 0 | Status: SHIPPED | OUTPATIENT
Start: 2025-02-11

## 2025-02-18 ENCOUNTER — TELEPHONE (OUTPATIENT)
Dept: RADIATION ONCOLOGY | Age: 60
End: 2025-02-18

## 2025-02-18 NOTE — TELEPHONE ENCOUNTER
I called pt to remind him of his rad/onc follow up on 2/20./25, pt states he needs to cancel appt due to lack of transportation. His cab company dropped him. Pt states he will c/b to reschedule.

## 2025-02-26 DIAGNOSIS — I66.01 STENOSIS OF RIGHT MIDDLE CEREBRAL ARTERY: ICD-10-CM

## 2025-02-26 DIAGNOSIS — E11.42 TYPE 2 DIABETES MELLITUS WITH DIABETIC POLYNEUROPATHY, WITH LONG-TERM CURRENT USE OF INSULIN (HCC): ICD-10-CM

## 2025-02-26 DIAGNOSIS — I73.9 PAD (PERIPHERAL ARTERY DISEASE): ICD-10-CM

## 2025-02-26 DIAGNOSIS — Z79.4 TYPE 2 DIABETES MELLITUS WITH DIABETIC POLYNEUROPATHY, WITH LONG-TERM CURRENT USE OF INSULIN (HCC): ICD-10-CM

## 2025-02-26 DIAGNOSIS — I25.10 CORONARY ARTERY DISEASE INVOLVING NATIVE HEART WITHOUT ANGINA PECTORIS, UNSPECIFIED VESSEL OR LESION TYPE: ICD-10-CM

## 2025-02-26 RX ORDER — ATORVASTATIN CALCIUM 80 MG/1
80 TABLET, FILM COATED ORAL NIGHTLY
Qty: 30 TABLET | Refills: 3 | Status: SHIPPED | OUTPATIENT
Start: 2025-02-26

## 2025-02-26 NOTE — TELEPHONE ENCOUNTER
Last visit: 10/25/24  Last Med refill: 8/30/24  Does patient have enough medication for 72 hours: no    Next Visit Date:  Future Appointments   Date Time Provider Department Center   3/25/2025 10:00 AM Bran Ahuja MD [facility]       Health Maintenance   Topic Date Due    COVID-19 Vaccine (1) Never done    Diabetic retinal exam  Never done    Colorectal Cancer Screen  Never done    Lipids  01/09/2024    Hepatitis B vaccine (4 of 4 - Risk Dialysis Recombivax 3-dose series) 01/27/2024    Diabetic foot exam  05/04/2024    Respiratory Syncytial Virus (RSV) Pregnant or age 60 yrs+ (1 - Risk 60-74 years 1-dose series) Never done    Depression Screen  08/23/2025    A1C test (Diabetic or Prediabetic)  10/25/2025    DTaP/Tdap/Td vaccine (2 - Td or Tdap) 11/11/2025    Flu vaccine  Completed    Shingles vaccine  Completed    Pneumococcal 50+ years Vaccine  Completed    Hepatitis C screen  Completed    HIV screen  Completed    Hepatitis A vaccine  Aged Out    Hib vaccine  Aged Out    Polio vaccine  Aged Out    Meningococcal (ACWY) vaccine  Aged Out    Diabetic Alb to Cr ratio (uACR) test  Discontinued    GFR test (Diabetes, CKD 3-4, OR last GFR 15-59)  Discontinued    Pneumococcal 0-49 years Vaccine  Discontinued    Lung Cancer Screening &/or Counseling  Discontinued       Hemoglobin A1C (%)   Date Value   10/25/2024 7.4   08/23/2024 10.0   10/20/2023 6.1             ( goal A1C is < 7)   No components found for: \"LABMICR\"  No components found for: \"LDLCHOLESTEROL\", \"LDLCALC\"    (goal LDL is <100)   AST (U/L)   Date Value   09/06/2023 66 (H)     ALT (U/L)   Date Value   09/06/2023 32     BUN (mg/dL)   Date Value   08/23/2024 16     BP Readings from Last 3 Encounters:   10/25/24 (!) 151/69   09/26/24 (!) 136/59   08/23/24 (!) 172/73          (goal 120/80)    All Future Testing planned in [facility]  Lab Frequency Next Occurrence   TCC - Vascular duplex carotid bilateral Once 08/12/2024   Lipid, Fasting Once

## 2025-03-11 DIAGNOSIS — Z79.4 TYPE 2 DIABETES MELLITUS WITH DIABETIC POLYNEUROPATHY, WITH LONG-TERM CURRENT USE OF INSULIN (HCC): ICD-10-CM

## 2025-03-11 DIAGNOSIS — E11.42 TYPE 2 DIABETES MELLITUS WITH DIABETIC POLYNEUROPATHY, WITH LONG-TERM CURRENT USE OF INSULIN (HCC): ICD-10-CM

## 2025-03-11 RX ORDER — EMPAGLIFLOZIN 10 MG/1
10 TABLET, FILM COATED ORAL DAILY
Qty: 30 TABLET | Refills: 1 | Status: SHIPPED | OUTPATIENT
Start: 2025-03-11

## 2025-03-11 NOTE — TELEPHONE ENCOUNTER
Last visit: 10/25/2024  Last Med refill: 01/21/2025  Does patient have enough medication for 72 hours: No:     Next Visit Date:  Future Appointments   Date Time Provider Department Center   3/25/2025 10:00 AM Bran Ahuja MD [facility]       Health Maintenance   Topic Date Due    COVID-19 Vaccine (1) Never done    Diabetic retinal exam  Never done    Colorectal Cancer Screen  Never done    Lipids  01/09/2024    Hepatitis B vaccine (4 of 4 - Risk Dialysis Recombivax 3-dose series) 01/27/2024    Diabetic foot exam  05/04/2024    Respiratory Syncytial Virus (RSV) Pregnant or age 60 yrs+ (1 - Risk 60-74 years 1-dose series) Never done    Depression Screen  08/23/2025    A1C test (Diabetic or Prediabetic)  10/25/2025    DTaP/Tdap/Td vaccine (2 - Td or Tdap) 11/11/2025    Flu vaccine  Completed    Shingles vaccine  Completed    Pneumococcal 50+ years Vaccine  Completed    Hepatitis C screen  Completed    HIV screen  Completed    Hepatitis A vaccine  Aged Out    Hib vaccine  Aged Out    Polio vaccine  Aged Out    Meningococcal (ACWY) vaccine  Aged Out    Meningococcal B vaccine  Aged Out    Diabetic Alb to Cr ratio (uACR) test  Discontinued    GFR test (Diabetes, CKD 3-4, OR last GFR 15-59)  Discontinued    Pneumococcal 0-49 years Vaccine  Discontinued    Lung Cancer Screening &/or Counseling  Discontinued       Hemoglobin A1C (%)   Date Value   10/25/2024 7.4   08/23/2024 10.0   10/20/2023 6.1             ( goal A1C is < 7)   No components found for: \"LABMICR\"  No components found for: \"LDLCHOLESTEROL\", \"LDLCALC\"    (goal LDL is <100)   AST (U/L)   Date Value   09/06/2023 66 (H)     ALT (U/L)   Date Value   09/06/2023 32     BUN (mg/dL)   Date Value   08/23/2024 16     BP Readings from Last 3 Encounters:   10/25/24 (!) 151/69   09/26/24 (!) 136/59   08/23/24 (!) 172/73          (goal 120/80)    All Future Testing planned in [facility]  Lab Frequency Next Occurrence   TCC - Vascular duplex carotid bilateral

## 2025-03-26 DIAGNOSIS — I25.10 CORONARY ARTERY DISEASE INVOLVING NATIVE HEART WITHOUT ANGINA PECTORIS, UNSPECIFIED VESSEL OR LESION TYPE: ICD-10-CM

## 2025-03-26 DIAGNOSIS — I10 ESSENTIAL HYPERTENSION: ICD-10-CM

## 2025-03-26 DIAGNOSIS — E11.42 TYPE 2 DIABETES MELLITUS WITH DIABETIC POLYNEUROPATHY, WITH LONG-TERM CURRENT USE OF INSULIN (HCC): ICD-10-CM

## 2025-03-26 DIAGNOSIS — Z79.4 TYPE 2 DIABETES MELLITUS WITH DIABETIC POLYNEUROPATHY, WITH LONG-TERM CURRENT USE OF INSULIN (HCC): ICD-10-CM

## 2025-03-26 RX ORDER — LISINOPRIL 20 MG/1
20 TABLET ORAL DAILY
Qty: 30 TABLET | Refills: 2 | Status: SHIPPED | OUTPATIENT
Start: 2025-03-26

## 2025-03-26 NOTE — TELEPHONE ENCOUNTER
Last visit: 10/25/2024  Last Med refill: 11/14/2024  Does patient have enough medication for 72 hours: No:     Next Visit Date:  No future appointments.    Health Maintenance   Topic Date Due    COVID-19 Vaccine (1) Never done    Diabetic retinal exam  Never done    Colorectal Cancer Screen  Never done    Lipids  01/09/2024    Hepatitis B vaccine (4 of 4 - Risk Dialysis Recombivax 3-dose series) 01/27/2024    Diabetic foot exam  05/04/2024    Respiratory Syncytial Virus (RSV) Pregnant or age 60 yrs+ (1 - Risk 60-74 years 1-dose series) Never done    Depression Screen  08/23/2025    A1C test (Diabetic or Prediabetic)  10/25/2025    DTaP/Tdap/Td vaccine (2 - Td or Tdap) 11/11/2025    Flu vaccine  Completed    Shingles vaccine  Completed    Pneumococcal 50+ years Vaccine  Completed    Hepatitis C screen  Completed    HIV screen  Completed    Hepatitis A vaccine  Aged Out    Hib vaccine  Aged Out    Polio vaccine  Aged Out    Meningococcal (ACWY) vaccine  Aged Out    Meningococcal B vaccine  Aged Out    Diabetic Alb to Cr ratio (uACR) test  Discontinued    GFR test (Diabetes, CKD 3-4, OR last GFR 15-59)  Discontinued    Pneumococcal 0-49 years Vaccine  Discontinued    Lung Cancer Screening &/or Counseling  Discontinued       Hemoglobin A1C (%)   Date Value   10/25/2024 7.4   08/23/2024 10.0   10/20/2023 6.1             ( goal A1C is < 7)   No components found for: \"LABMICR\"  No components found for: \"LDLCHOLESTEROL\", \"LDLCALC\"    (goal LDL is <100)   AST (U/L)   Date Value   09/06/2023 66 (H)     ALT (U/L)   Date Value   09/06/2023 32     BUN (mg/dL)   Date Value   08/23/2024 16     BP Readings from Last 3 Encounters:   10/25/24 (!) 151/69   09/26/24 (!) 136/59   08/23/24 (!) 172/73          (goal 120/80)    All Future Testing planned in [facility]  Lab Frequency Next Occurrence   TCC - Vascular duplex carotid bilateral Once 08/12/2024   Lipid, Fasting Once 08/12/2024   ALT Once 08/12/2024   AST Once 08/12/2024   CT CHEST

## 2025-04-15 DIAGNOSIS — I73.9 PAD (PERIPHERAL ARTERY DISEASE): ICD-10-CM

## 2025-04-15 DIAGNOSIS — I66.01 STENOSIS OF RIGHT MIDDLE CEREBRAL ARTERY: ICD-10-CM

## 2025-04-15 DIAGNOSIS — I25.10 CORONARY ARTERY DISEASE INVOLVING NATIVE HEART WITHOUT ANGINA PECTORIS, UNSPECIFIED VESSEL OR LESION TYPE: ICD-10-CM

## 2025-04-15 RX ORDER — CLOPIDOGREL BISULFATE 75 MG/1
75 TABLET ORAL DAILY
Qty: 30 TABLET | Refills: 2 | Status: SHIPPED | OUTPATIENT
Start: 2025-04-15

## 2025-04-15 NOTE — TELEPHONE ENCOUNTER
Last visit: 10/25/2024  Last Med refill: 1/31/2025  Does patient have enough medication for 72 hours: No:     Next Visit Date:  No future appointments.    Health Maintenance   Topic Date Due    COVID-19 Vaccine (1) Never done    Diabetic retinal exam  Never done    Colorectal Cancer Screen  Never done    Lipids  01/09/2024    Hepatitis B vaccine (4 of 4 - Risk Dialysis Recombivax 3-dose series) 01/27/2024    Diabetic foot exam  05/04/2024    Respiratory Syncytial Virus (RSV) Pregnant or age 60 yrs+ (1 - Risk 60-74 years 1-dose series) Never done    Depression Screen  08/23/2025    A1C test (Diabetic or Prediabetic)  10/25/2025    DTaP/Tdap/Td vaccine (2 - Td or Tdap) 11/11/2025    Flu vaccine  Completed    Shingles vaccine  Completed    Pneumococcal 50+ years Vaccine  Completed    Hepatitis C screen  Completed    HIV screen  Completed    Hepatitis A vaccine  Aged Out    Hib vaccine  Aged Out    Polio vaccine  Aged Out    Meningococcal (ACWY) vaccine  Aged Out    Meningococcal B vaccine  Aged Out    Diabetic Alb to Cr ratio (uACR) test  Discontinued    GFR test (Diabetes, CKD 3-4, OR last GFR 15-59)  Discontinued    Pneumococcal 0-49 years Vaccine  Discontinued    Lung Cancer Screening &/or Counseling  Discontinued       Hemoglobin A1C (%)   Date Value   10/25/2024 7.4   08/23/2024 10.0   10/20/2023 6.1             ( goal A1C is < 7)   No components found for: \"LABMICR\"  No components found for: \"LDLCHOLESTEROL\", \"LDLCALC\"    (goal LDL is <100)   AST (U/L)   Date Value   09/06/2023 66 (H)     ALT (U/L)   Date Value   09/06/2023 32     BUN (mg/dL)   Date Value   08/23/2024 16     BP Readings from Last 3 Encounters:   10/25/24 (!) 151/69   09/26/24 (!) 136/59   08/23/24 (!) 172/73          (goal 120/80)    All Future Testing planned in [facility]  Lab Frequency Next Occurrence   TCC - Vascular duplex carotid bilateral Once 08/12/2024   Lipid, Fasting Once 08/12/2024   ALT Once 08/12/2024   AST Once 08/12/2024   CT CHEST

## 2025-04-23 DIAGNOSIS — I10 ESSENTIAL HYPERTENSION: ICD-10-CM

## 2025-04-23 DIAGNOSIS — Z79.4 TYPE 2 DIABETES MELLITUS WITH DIABETIC POLYNEUROPATHY, WITH LONG-TERM CURRENT USE OF INSULIN (HCC): ICD-10-CM

## 2025-04-23 DIAGNOSIS — E11.42 TYPE 2 DIABETES MELLITUS WITH DIABETIC POLYNEUROPATHY, WITH LONG-TERM CURRENT USE OF INSULIN (HCC): ICD-10-CM

## 2025-04-23 RX ORDER — LEVETIRACETAM 500 MG/1
500 TABLET ORAL 2 TIMES DAILY
Qty: 60 TABLET | Refills: 0 | Status: SHIPPED | OUTPATIENT
Start: 2025-04-23

## 2025-04-23 RX ORDER — AMLODIPINE BESYLATE 5 MG/1
10 TABLET ORAL DAILY
Qty: 30 TABLET | Refills: 0 | Status: SHIPPED | OUTPATIENT
Start: 2025-04-23

## 2025-04-23 RX ORDER — INSULIN GLARGINE 100 [IU]/ML
13 INJECTION, SOLUTION SUBCUTANEOUS 2 TIMES DAILY
Qty: 15 ML | Refills: 3 | Status: SHIPPED | OUTPATIENT
Start: 2025-04-23

## 2025-04-23 NOTE — TELEPHONE ENCOUNTER
Last visit: 10/25/2024  Last Med refill: 01/10/25  Does patient have enough medication for 72 hours: No:     Next Visit Date:  No future appointments.    Health Maintenance   Topic Date Due    COVID-19 Vaccine (1) Never done    Diabetic retinal exam  Never done    Colorectal Cancer Screen  Never done    Lipids  01/09/2024    Hepatitis B vaccine (4 of 4 - Risk Dialysis Recombivax 3-dose series) 01/27/2024    Diabetic foot exam  05/04/2024    Respiratory Syncytial Virus (RSV) Pregnant or age 60 yrs+ (1 - Risk 60-74 years 1-dose series) Never done    Depression Screen  08/23/2025    A1C test (Diabetic or Prediabetic)  10/25/2025    DTaP/Tdap/Td vaccine (2 - Td or Tdap) 11/11/2025    Flu vaccine  Completed    Shingles vaccine  Completed    Pneumococcal 50+ years Vaccine  Completed    Hepatitis C screen  Completed    HIV screen  Completed    Hepatitis A vaccine  Aged Out    Hib vaccine  Aged Out    Polio vaccine  Aged Out    Meningococcal (ACWY) vaccine  Aged Out    Meningococcal B vaccine  Aged Out    Diabetic Alb to Cr ratio (uACR) test  Discontinued    GFR test (Diabetes, CKD 3-4, OR last GFR 15-59)  Discontinued    Pneumococcal 0-49 years Vaccine  Discontinued    Lung Cancer Screening &/or Counseling  Discontinued       Hemoglobin A1C (%)   Date Value   10/25/2024 7.4   08/23/2024 10.0   10/20/2023 6.1             ( goal A1C is < 7)   No components found for: \"LABMICR\"  No components found for: \"LDLCHOLESTEROL\", \"LDLCALC\"    (goal LDL is <100)   AST (U/L)   Date Value   09/06/2023 66 (H)     ALT (U/L)   Date Value   09/06/2023 32     BUN (mg/dL)   Date Value   08/23/2024 16     BP Readings from Last 3 Encounters:   10/25/24 (!) 151/69   09/26/24 (!) 136/59   08/23/24 (!) 172/73          (goal 120/80)    All Future Testing planned in [facility]  Lab Frequency Next Occurrence   TCC - Vascular duplex carotid bilateral Once 08/12/2024   Lipid, Fasting Once 08/12/2024   ALT Once 08/12/2024   AST Once 08/12/2024   CT CHEST

## 2025-04-30 DIAGNOSIS — Z79.4 TYPE 2 DIABETES MELLITUS WITH DIABETIC POLYNEUROPATHY, WITH LONG-TERM CURRENT USE OF INSULIN (HCC): ICD-10-CM

## 2025-04-30 DIAGNOSIS — E11.42 TYPE 2 DIABETES MELLITUS WITH DIABETIC POLYNEUROPATHY, WITH LONG-TERM CURRENT USE OF INSULIN (HCC): ICD-10-CM

## 2025-05-01 NOTE — TELEPHONE ENCOUNTER
Last visit: 10/25/2024  Last Med refill:   Does patient have enough medication for 72 hours: No    Next Visit Date:  Future Appointments   Date Time Provider Department Center   5/9/2025  9:20 AM Mio Foley MD [facility] ECC DEP       Health Maintenance   Topic Date Due    COVID-19 Vaccine (1) Never done    Diabetic retinal exam  Never done    Colorectal Cancer Screen  Never done    Lipids  01/09/2024    Hepatitis B vaccine (4 of 4 - Risk Dialysis Recombivax 3-dose series) 01/27/2024    Diabetic foot exam  05/04/2024    Respiratory Syncytial Virus (RSV) Pregnant or age 60 yrs+ (1 - Risk 60-74 years 1-dose series) Never done    Depression Screen  08/23/2025    A1C test (Diabetic or Prediabetic)  10/25/2025    DTaP/Tdap/Td vaccine (2 - Td or Tdap) 11/11/2025    Flu vaccine  Completed    Shingles vaccine  Completed    Pneumococcal 50+ years Vaccine  Completed    Hepatitis C screen  Completed    HIV screen  Completed    Hepatitis A vaccine  Aged Out    Hib vaccine  Aged Out    Polio vaccine  Aged Out    Meningococcal (ACWY) vaccine  Aged Out    Meningococcal B vaccine  Aged Out    Diabetic Alb to Cr ratio (uACR) test  Discontinued    GFR test (Diabetes, CKD 3-4, OR last GFR 15-59)  Discontinued    Pneumococcal 0-49 years Vaccine  Discontinued    Lung Cancer Screening &/or Counseling  Discontinued       Hemoglobin A1C (%)   Date Value   10/25/2024 7.4   08/23/2024 10.0   10/20/2023 6.1             ( goal A1C is < 7)   No components found for: \"LABMICR\"  No components found for: \"LDLCHOLESTEROL\", \"LDLCALC\"    (goal LDL is <100)   AST (U/L)   Date Value   09/06/2023 66 (H)     ALT (U/L)   Date Value   09/06/2023 32     BUN (mg/dL)   Date Value   08/23/2024 16     BP Readings from Last 3 Encounters:   10/25/24 (!) 151/69   09/26/24 (!) 136/59   08/23/24 (!) 172/73          (goal 120/80)    All Future Testing planned in [facility]  Lab Frequency Next Occurrence   TCC - Vascular duplex carotid bilateral Once 08/12/2024

## 2025-05-02 RX ORDER — EMPAGLIFLOZIN 10 MG/1
10 TABLET, FILM COATED ORAL DAILY
Qty: 30 TABLET | Refills: 1 | Status: SHIPPED | OUTPATIENT
Start: 2025-05-02

## 2025-05-09 ENCOUNTER — OFFICE VISIT (OUTPATIENT)
Age: 60
End: 2025-05-09
Payer: MEDICAID

## 2025-05-09 ENCOUNTER — HOSPITAL ENCOUNTER (OUTPATIENT)
Age: 60
Setting detail: SPECIMEN
Discharge: HOME | End: 2025-05-09

## 2025-05-09 VITALS
TEMPERATURE: 96.7 F | WEIGHT: 155 LBS | DIASTOLIC BLOOD PRESSURE: 70 MMHG | BODY MASS INDEX: 25.79 KG/M2 | HEART RATE: 70 BPM | SYSTOLIC BLOOD PRESSURE: 170 MMHG

## 2025-05-09 DIAGNOSIS — E11.42 TYPE 2 DIABETES MELLITUS WITH DIABETIC POLYNEUROPATHY, WITH LONG-TERM CURRENT USE OF INSULIN (HCC): Primary | ICD-10-CM

## 2025-05-09 DIAGNOSIS — I10 ESSENTIAL HYPERTENSION: ICD-10-CM

## 2025-05-09 DIAGNOSIS — Z79.4 TYPE 2 DIABETES MELLITUS WITH DIABETIC POLYNEUROPATHY, WITH LONG-TERM CURRENT USE OF INSULIN (HCC): Primary | ICD-10-CM

## 2025-05-09 DIAGNOSIS — I50.22 CHRONIC SYSTOLIC CONGESTIVE HEART FAILURE (HCC): ICD-10-CM

## 2025-05-09 DIAGNOSIS — E11.42 TYPE 2 DIABETES MELLITUS WITH DIABETIC POLYNEUROPATHY, WITH LONG-TERM CURRENT USE OF INSULIN (HCC): ICD-10-CM

## 2025-05-09 DIAGNOSIS — Z79.4 TYPE 2 DIABETES MELLITUS WITH DIABETIC POLYNEUROPATHY, WITH LONG-TERM CURRENT USE OF INSULIN (HCC): ICD-10-CM

## 2025-05-09 LAB
ANION GAP SERPL CALCULATED.3IONS-SCNC: 12 MMOL/L (ref 9–16)
BUN SERPL-MCNC: 26 MG/DL (ref 8–23)
CALCIUM SERPL-MCNC: 8.9 MG/DL (ref 8.6–10.4)
CHLORIDE SERPL-SCNC: 106 MMOL/L (ref 98–107)
CO2 SERPL-SCNC: 23 MMOL/L (ref 20–31)
CREAT SERPL-MCNC: 2.2 MG/DL (ref 0.7–1.2)
GFR, ESTIMATED: 33 ML/MIN/1.73M2
GLUCOSE SERPL-MCNC: 121 MG/DL (ref 74–99)
HBA1C MFR BLD: 7.1 %
POTASSIUM SERPL-SCNC: 3.7 MMOL/L (ref 3.7–5.3)
SODIUM SERPL-SCNC: 141 MMOL/L (ref 136–145)

## 2025-05-09 PROCEDURE — 99212 OFFICE O/P EST SF 10 MIN: CPT

## 2025-05-09 PROCEDURE — 83036 HEMOGLOBIN GLYCOSYLATED A1C: CPT

## 2025-05-09 RX ORDER — INSULIN GLARGINE 100 [IU]/ML
13 INJECTION, SOLUTION SUBCUTANEOUS NIGHTLY
Qty: 15 ML | Refills: 2 | Status: SHIPPED | OUTPATIENT
Start: 2025-05-09

## 2025-05-09 RX ORDER — SPIRONOLACTONE 25 MG/1
12.5 TABLET ORAL DAILY
Qty: 45 TABLET | Refills: 1 | Status: SHIPPED | OUTPATIENT
Start: 2025-05-09

## 2025-05-09 RX ORDER — LEVETIRACETAM 500 MG/1
500 TABLET ORAL 2 TIMES DAILY
Qty: 60 TABLET | Refills: 0 | Status: SHIPPED | OUTPATIENT
Start: 2025-05-09

## 2025-05-09 RX ORDER — CARVEDILOL 6.25 MG/1
6.25 TABLET ORAL 2 TIMES DAILY
Qty: 60 TABLET | Refills: 0 | Status: SHIPPED | OUTPATIENT
Start: 2025-05-09

## 2025-05-09 RX ORDER — PSEUDOEPHED/ACETAMINOPH/DIPHEN 30MG-500MG
2 TABLET ORAL EVERY 8 HOURS PRN
Qty: 120 TABLET | Refills: 0 | Status: SHIPPED | OUTPATIENT
Start: 2025-05-09

## 2025-05-09 RX ORDER — MEDICAL SUPPLY, MISCELLANEOUS
EACH MISCELLANEOUS
Qty: 1 EACH | Refills: 0 | Status: SHIPPED | OUTPATIENT
Start: 2025-05-09

## 2025-05-09 RX ORDER — AMLODIPINE BESYLATE 5 MG/1
10 TABLET ORAL DAILY
Qty: 30 TABLET | Refills: 0 | Status: SHIPPED | OUTPATIENT
Start: 2025-05-09

## 2025-05-09 SDOH — ECONOMIC STABILITY: FOOD INSECURITY: WITHIN THE PAST 12 MONTHS, YOU WORRIED THAT YOUR FOOD WOULD RUN OUT BEFORE YOU GOT MONEY TO BUY MORE.: NEVER TRUE

## 2025-05-09 SDOH — ECONOMIC STABILITY: FOOD INSECURITY: WITHIN THE PAST 12 MONTHS, THE FOOD YOU BOUGHT JUST DIDN'T LAST AND YOU DIDN'T HAVE MONEY TO GET MORE.: NEVER TRUE

## 2025-05-09 ASSESSMENT — ENCOUNTER SYMPTOMS
VOMITING: 0
WHEEZING: 0
SHORTNESS OF BREATH: 1
ABDOMINAL PAIN: 0
NAUSEA: 0
CONSTIPATION: 0
DIARRHEA: 0

## 2025-05-09 NOTE — PATIENT INSTRUCTIONS
[facility] Transportation Resources*  (Call [employer]/211 if need more resources)     [facility] on [facility] ([facility]):  What they offer: Medical cab rides for seniors and referral to community resources.  Phone Number: [phone]  [employer]:  What they offer: Medical Appointments Transportation  Phone Number: [phone] ext: [phone]    [employer] Senior Transportation Program:  What they offer: 2 free rides per month to seniors 65+ in [facility]. Sign up by filling out the form for Senior Rides on their website.  Phone Number: [phone] ([phone]) or [phone]  [employer]:  What they offer: Transportation, 4-64 years old $4, 65 and older reduced fare $2 within [facility] limits.  Phone Number: [phone] to schedule (at least one hour in advance); [phone] (General Information)  Non-Emergency Transportation:   [facility] transportation to [facility], [place], [place] and [facility]  What they offer: Rides to medical and health appointments to Medicaid recipients  Phone Number: [phone]  [facility] Community Action Commission (NOCAC):   Rosanne, Servando, Kobi, Magalis, Jerzy Curran, and Juan councynthia  What they offer: referral to community transportation and other resources.  Phone Number: [phone]    [employer]:  What they offer: Public transportation; disability transportation ([employer]AR[employer])  Phone Number: [phone] (6159); [phone] ([employer]ARPS)  [place] [employer]  What they offer: Transportation services to [place] residents. Services include 2 connecting points to the [employer] system.  Phone Number: Subject to availability, call [phone].  [employer] Non-Emergency Transportation Service  What they offer: Rides to and from Medicaid providers for Medicaid recipients. Must complete forms found on [facility] Website, including assessment completed by medical provider.  Phone Number:
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
## 2025-05-09 NOTE — PROGRESS NOTES
Visit Information    Have you changed or started any medications since your last visit including any over-the-counter medicines, vitamins, or herbal medicines? no   Have you stopped taking any of your medications? Is so, why? -  no  Are you having any side effects from any of your medications? - no    Have you seen any other physician or provider since your last visit?  no   Have you had any other diagnostic tests since your last visit?  no   Have you been seen in the emergency room and/or had an admission in a hospital since we last saw you?  no   Have you had your routine dental cleaning in the past 6 months?  no     Do you have an active MyChart account? If no, what is the barrier?  Yes    Patient Care Team:  Mio Foley MD as PCP - General (Family Medicine)  Dionisio Gomez MD as Consulting Physician (Pulmonology)    Medical History Review  Past Medical, Family, and Social History reviewed and does not contribute to the patient presenting condition    Health Maintenance   Topic Date Due    COVID-19 Vaccine (1) Never done    Diabetic retinal exam  Never done    Colorectal Cancer Screen  Never done    Lipids  01/09/2024    Hepatitis B vaccine (4 of 4 - Risk Dialysis Recombivax 3-dose series) 01/27/2024    Diabetic foot exam  05/04/2024    Respiratory Syncytial Virus (RSV) Pregnant or age 60 yrs+ (1 - Risk 60-74 years 1-dose series) Never done    Depression Screen  08/23/2025    A1C test (Diabetic or Prediabetic)  10/25/2025    DTaP/Tdap/Td vaccine (2 - Td or Tdap) 11/11/2025    Flu vaccine  Completed    Shingles vaccine  Completed    Pneumococcal 50+ years Vaccine  Completed    Hepatitis C screen  Completed    HIV screen  Completed    Hepatitis A vaccine  Aged Out    Hib vaccine  Aged Out    Polio vaccine  Aged Out    Meningococcal (ACWY) vaccine  Aged Out    Meningococcal B vaccine  Aged Out    Diabetic Alb to Cr ratio (uACR) test  Discontinued    GFR test (Diabetes, CKD 3-4, OR last GFR 15-59)  Discontinued

## 2025-06-03 DIAGNOSIS — Z79.4 TYPE 2 DIABETES MELLITUS WITH DIABETIC POLYNEUROPATHY, WITH LONG-TERM CURRENT USE OF INSULIN (HCC): ICD-10-CM

## 2025-06-03 DIAGNOSIS — I73.9 PAD (PERIPHERAL ARTERY DISEASE): ICD-10-CM

## 2025-06-03 DIAGNOSIS — I25.10 CORONARY ARTERY DISEASE INVOLVING NATIVE HEART WITHOUT ANGINA PECTORIS, UNSPECIFIED VESSEL OR LESION TYPE: ICD-10-CM

## 2025-06-03 DIAGNOSIS — I66.01 STENOSIS OF RIGHT MIDDLE CEREBRAL ARTERY: ICD-10-CM

## 2025-06-03 DIAGNOSIS — E11.42 TYPE 2 DIABETES MELLITUS WITH DIABETIC POLYNEUROPATHY, WITH LONG-TERM CURRENT USE OF INSULIN (HCC): ICD-10-CM

## 2025-06-04 RX ORDER — ATORVASTATIN CALCIUM 80 MG/1
80 TABLET, FILM COATED ORAL NIGHTLY
Qty: 30 TABLET | Refills: 3 | Status: SHIPPED | OUTPATIENT
Start: 2025-06-04

## 2025-06-04 NOTE — TELEPHONE ENCOUNTER
Last visit: 05/09/2025  Last Med refill: 02/26/2025  Does patient have enough medication for 72 hours: No:     Next Visit Date:  Future Appointments   Date Time Provider Department Center   7/17/2025 10:00 AM Mio Foley MD [facility]       Health Maintenance   Topic Date Due    COVID-19 Vaccine (1) Never done    Diabetic retinal exam  Never done    Colorectal Cancer Screen  Never done    Lipids  01/09/2024    Hepatitis B vaccine (4 of 4 - Risk Dialysis Recombivax 3-dose series) 01/27/2024    Diabetic foot exam  05/04/2024    Respiratory Syncytial Virus (RSV) Pregnant or age 60 yrs+ (1 - Risk 60-74 years 1-dose series) Never done    Depression Screen  08/23/2025    DTaP/Tdap/Td vaccine (2 - Td or Tdap) 11/11/2025    A1C test (Diabetic or Prediabetic)  05/09/2026    Flu vaccine  Completed    Shingles vaccine  Completed    Pneumococcal 50+ years Vaccine  Completed    Hepatitis C screen  Completed    HIV screen  Completed    Hepatitis A vaccine  Aged Out    Hib vaccine  Aged Out    Polio vaccine  Aged Out    Meningococcal (ACWY) vaccine  Aged Out    Meningococcal B vaccine  Aged Out    Diabetic Alb to Cr ratio (uACR) test  Discontinued    GFR test (Diabetes, CKD 3-4, OR last GFR 15-59)  Discontinued    Pneumococcal 0-49 years Vaccine  Discontinued    Lung Cancer Screening &/or Counseling  Discontinued       Hemoglobin A1C (%)   Date Value   05/09/2025 7.1   10/25/2024 7.4   08/23/2024 10.0             ( goal A1C is < 7)   No components found for: \"LABMICR\"  No components found for: \"LDLCHOLESTEROL\", \"LDLCALC\"    (goal LDL is <100)   AST (U/L)   Date Value   09/06/2023 66 (H)     ALT (U/L)   Date Value   09/06/2023 32     BUN (mg/dL)   Date Value   05/09/2025 26 (H)     BP Readings from Last 3 Encounters:   05/09/25 (!) 170/70   10/25/24 (!) 151/69   09/26/24 (!) 136/59          (goal 120/80)    All Future Testing planned in [facility]  Lab Frequency Next Occurrence   [facility] - Vascular duplex carotid bilateral

## 2025-06-10 DIAGNOSIS — I25.10 CORONARY ARTERY DISEASE INVOLVING NATIVE HEART WITHOUT ANGINA PECTORIS, UNSPECIFIED VESSEL OR LESION TYPE: ICD-10-CM

## 2025-06-10 DIAGNOSIS — Z79.4 TYPE 2 DIABETES MELLITUS WITH DIABETIC POLYNEUROPATHY, WITH LONG-TERM CURRENT USE OF INSULIN (HCC): ICD-10-CM

## 2025-06-10 DIAGNOSIS — E11.42 TYPE 2 DIABETES MELLITUS WITH DIABETIC POLYNEUROPATHY, WITH LONG-TERM CURRENT USE OF INSULIN (HCC): ICD-10-CM

## 2025-06-10 DIAGNOSIS — I10 ESSENTIAL HYPERTENSION: ICD-10-CM

## 2025-06-10 RX ORDER — LISINOPRIL 20 MG/1
20 TABLET ORAL DAILY
Qty: 30 TABLET | Refills: 2 | Status: SHIPPED | OUTPATIENT
Start: 2025-06-10

## 2025-06-10 NOTE — TELEPHONE ENCOUNTER
Once 08/12/2024   Lipid, Fasting Once 08/12/2024   ALT Once 08/12/2024   AST Once 08/12/2024   CT CHEST WO CONTRAST Once 03/26/2025   Lipid Panel Once 10/25/2024               Patient Active Problem List:     PAD (peripheral artery disease)     Subacute osteomyelitis of right tibia (HCC)     Acute metabolic encephalopathy     Abnormal CT of the head     Hepatitis     Cerebral multi-infarct state     Multi infarct state     Carotid stenosis, bilateral     Right foot drop     Thrombocytosis     Acute CVA (cerebrovascular accident) (HCC)     Moderate malnutrition     Cellulitis of right lower extremity     Right-sided extracranial carotid artery stenosis     Essential hypertension     Type 2 diabetes mellitus with diabetic neuropathy, without long-term current use of insulin (HCC)     Leukocytosis     Status post angiography of extremity     Ischemic gangrene (HCC)     Pyogenic inflammation of bone (HCC)     Coronary artery disease     Above knee amputation of right lower extremity (HCC)     Pressure injury of right heel, unstageable (HCC)     Hx of AKA (above knee amputation), right (HCC)     Erectile dysfunction associated with type 2 diabetes mellitus (HCC)     Altered mental status     DKA, type 2, not at goal (HCC)     Stenosis of right middle cerebral artery     At risk of seizures     Cardiomyopathy, ischemic     Acute ischemic right MCA stroke (HCC)     Presence of internal carotid stent     LV (left ventricular) mural thrombus     Chronic systolic congestive heart failure (HCC)     Diabetes mellitus (HCC)     Lung nodule     Primary adenocarcinoma of upper lobe of right lung (HCC)     Lymphadenopathy     ALMITA (acute kidney injury)     Severe malnutrition     LUE weakness     Dependence on renal dialysis     Sinus tachycardia     Centrilobular emphysema (HCC)     Mixed hyperlipidemia     Stage 3b chronic kidney disease (HCC)     Need for vaccination for Strep pneumoniae

## 2025-06-18 RX ORDER — FUROSEMIDE 40 MG/1
40 TABLET ORAL DAILY
Qty: 60 TABLET | Refills: 2 | Status: SHIPPED | OUTPATIENT
Start: 2025-06-18

## 2025-06-18 NOTE — TELEPHONE ENCOUNTER
Last visit: 05/09/2025  Last Med refill: 01/02/2025  Does patient have enough medication for 72 hours: No    Next Visit Date:  Future Appointments   Date Time Provider Department Center   7/17/2025 10:00 AM Mio Foley MD [facility]       Health Maintenance   Topic Date Due    COVID-19 Vaccine (1) Never done    Diabetic retinal exam  Never done    Colorectal Cancer Screen  Never done    Lipids  01/09/2024    Hepatitis B vaccine (4 of 4 - Risk Dialysis Recombivax 3-dose series) 01/27/2024    Diabetic foot exam  05/04/2024    Respiratory Syncytial Virus (RSV) Pregnant or age 60 yrs+ (1 - Risk 60-74 years 1-dose series) Never done    Depression Screen  08/23/2025    DTaP/Tdap/Td vaccine (2 - Td or Tdap) 11/11/2025    A1C test (Diabetic or Prediabetic)  05/09/2026    Flu vaccine  Completed    Shingles vaccine  Completed    Pneumococcal 50+ years Vaccine  Completed    Hepatitis C screen  Completed    HIV screen  Completed    Hepatitis A vaccine  Aged Out    Hib vaccine  Aged Out    Polio vaccine  Aged Out    Meningococcal (ACWY) vaccine  Aged Out    Meningococcal B vaccine  Aged Out    Diabetic Alb to Cr ratio (uACR) test  Discontinued    GFR test (Diabetes, CKD 3-4, OR last GFR 15-59)  Discontinued    Pneumococcal 0-49 years Vaccine  Discontinued    Lung Cancer Screening &/or Counseling  Discontinued       Hemoglobin A1C (%)   Date Value   05/09/2025 7.1   10/25/2024 7.4   08/23/2024 10.0             ( goal A1C is < 7)   No components found for: \"LABMICR\"  No components found for: \"LDLCHOLESTEROL\", \"LDLCALC\"    (goal LDL is <100)   AST (U/L)   Date Value   09/06/2023 66 (H)     ALT (U/L)   Date Value   09/06/2023 32     BUN (mg/dL)   Date Value   05/09/2025 26 (H)     BP Readings from Last 3 Encounters:   05/09/25 (!) 170/70   10/25/24 (!) 151/69   09/26/24 (!) 136/59          (goal 120/80)    All Future Testing planned in [facility]  Lab Frequency Next Occurrence   [facility] - Vascular duplex carotid bilateral

## 2025-06-22 ENCOUNTER — TELEPHONE (OUTPATIENT)
Age: 60
End: 2025-06-22

## 2025-06-22 DIAGNOSIS — N18.32 STAGE 3B CHRONIC KIDNEY DISEASE (HCC): Primary | ICD-10-CM

## 2025-06-24 DIAGNOSIS — I25.10 CORONARY ARTERY DISEASE INVOLVING NATIVE HEART WITHOUT ANGINA PECTORIS, UNSPECIFIED VESSEL OR LESION TYPE: ICD-10-CM

## 2025-06-24 DIAGNOSIS — I73.9 PAD (PERIPHERAL ARTERY DISEASE): ICD-10-CM

## 2025-06-24 DIAGNOSIS — I66.01 STENOSIS OF RIGHT MIDDLE CEREBRAL ARTERY: ICD-10-CM

## 2025-06-24 RX ORDER — CLOPIDOGREL BISULFATE 75 MG/1
75 TABLET ORAL DAILY
Qty: 30 TABLET | Refills: 2 | Status: SHIPPED | OUTPATIENT
Start: 2025-06-24

## 2025-07-15 DIAGNOSIS — Z79.4 TYPE 2 DIABETES MELLITUS WITH DIABETIC POLYNEUROPATHY, WITH LONG-TERM CURRENT USE OF INSULIN (HCC): ICD-10-CM

## 2025-07-15 DIAGNOSIS — E11.42 TYPE 2 DIABETES MELLITUS WITH DIABETIC POLYNEUROPATHY, WITH LONG-TERM CURRENT USE OF INSULIN (HCC): ICD-10-CM

## 2025-07-15 RX ORDER — EMPAGLIFLOZIN 10 MG/1
10 TABLET, FILM COATED ORAL DAILY
Qty: 30 TABLET | Refills: 1 | Status: SHIPPED | OUTPATIENT
Start: 2025-07-15

## 2025-07-15 NOTE — TELEPHONE ENCOUNTER
Last visit: 05/09/25  Last Med refill: 05/09/25  Does patient have enough medication for 72 hours: No:     Next Visit Date:  Future Appointments   Date Time Provider Department Center   8/7/2025  9:00 AM Mio Foley MD [facility]       Health Maintenance   Topic Date Due    COVID-19 Vaccine (1) Never done    Diabetic retinal exam  Never done    Colorectal Cancer Screen  Never done    Lipids  01/09/2024    Hepatitis B vaccine (4 of 4 - Risk Dialysis Recombivax 3-dose series) 01/27/2024    Diabetic foot exam  05/04/2024    Respiratory Syncytial Virus (RSV) Pregnant or age 60 yrs+ (1 - Risk 60-74 years 1-dose series) Never done    Flu vaccine (1) 08/01/2025    Depression Screen  08/23/2025    DTaP/Tdap/Td vaccine (2 - Td or Tdap) 11/11/2025    A1C test (Diabetic or Prediabetic)  05/09/2026    Shingles vaccine  Completed    Pneumococcal 50+ years Vaccine  Completed    Hepatitis C screen  Completed    HIV screen  Completed    Hepatitis A vaccine  Aged Out    Hib vaccine  Aged Out    Polio vaccine  Aged Out    Meningococcal (ACWY) vaccine  Aged Out    Meningococcal B vaccine  Aged Out    Diabetic Alb to Cr ratio (uACR) test  Discontinued    GFR test (Diabetes, CKD 3-4, OR last GFR 15-59)  Discontinued    Pneumococcal 0-49 years Vaccine  Discontinued    Lung Cancer Screening &/or Counseling  Discontinued       Hemoglobin A1C (%)   Date Value   05/09/2025 7.1   10/25/2024 7.4   08/23/2024 10.0             ( goal A1C is < 7)   No components found for: \"LABMICR\"  No components found for: \"LDLCHOLESTEROL\", \"LDLCALC\"    (goal LDL is <100)   AST (U/L)   Date Value   09/06/2023 66 (H)     ALT (U/L)   Date Value   09/06/2023 32     BUN (mg/dL)   Date Value   05/09/2025 26 (H)     BP Readings from Last 3 Encounters:   05/09/25 (!) 170/70   10/25/24 (!) 151/69   09/26/24 (!) 136/59          (goal 120/80)    All Future Testing planned in [facility]  Lab Frequency Next Occurrence   T[facility] - Vascular duplex carotid bilateral

## 2025-08-01 DIAGNOSIS — E78.2 MIXED HYPERLIPIDEMIA: ICD-10-CM

## 2025-08-01 DIAGNOSIS — I73.9 PAD (PERIPHERAL ARTERY DISEASE): ICD-10-CM

## 2025-08-01 DIAGNOSIS — Z79.4 TYPE 2 DIABETES MELLITUS WITH DIABETIC POLYNEUROPATHY, WITH LONG-TERM CURRENT USE OF INSULIN (HCC): ICD-10-CM

## 2025-08-01 DIAGNOSIS — E11.42 TYPE 2 DIABETES MELLITUS WITH DIABETIC POLYNEUROPATHY, WITH LONG-TERM CURRENT USE OF INSULIN (HCC): ICD-10-CM

## 2025-08-01 DIAGNOSIS — I66.01 STENOSIS OF RIGHT MIDDLE CEREBRAL ARTERY: ICD-10-CM

## 2025-08-01 DIAGNOSIS — I25.10 CORONARY ARTERY DISEASE INVOLVING NATIVE HEART WITHOUT ANGINA PECTORIS, UNSPECIFIED VESSEL OR LESION TYPE: ICD-10-CM

## 2025-08-01 DIAGNOSIS — I10 ESSENTIAL HYPERTENSION: ICD-10-CM

## 2025-08-01 RX ORDER — CARVEDILOL 6.25 MG/1
6.25 TABLET ORAL 2 TIMES DAILY
Qty: 60 TABLET | Refills: 0 | Status: SHIPPED | OUTPATIENT
Start: 2025-08-01

## 2025-08-01 RX ORDER — SPIRONOLACTONE 25 MG/1
12.5 TABLET ORAL DAILY
Qty: 45 TABLET | Refills: 1 | Status: SHIPPED | OUTPATIENT
Start: 2025-08-01

## 2025-08-01 RX ORDER — LEVETIRACETAM 500 MG/1
500 TABLET ORAL 2 TIMES DAILY
Qty: 60 TABLET | Refills: 0 | Status: SHIPPED | OUTPATIENT
Start: 2025-08-01

## 2025-08-01 RX ORDER — LISINOPRIL 20 MG/1
20 TABLET ORAL DAILY
Qty: 30 TABLET | Refills: 2 | Status: SHIPPED | OUTPATIENT
Start: 2025-08-01

## 2025-08-01 RX ORDER — CLOPIDOGREL BISULFATE 75 MG/1
75 TABLET ORAL DAILY
Qty: 30 TABLET | Refills: 2 | Status: SHIPPED | OUTPATIENT
Start: 2025-08-01

## 2025-08-01 RX ORDER — AMLODIPINE BESYLATE 5 MG/1
10 TABLET ORAL DAILY
Qty: 30 TABLET | Refills: 0 | Status: SHIPPED | OUTPATIENT
Start: 2025-08-01

## 2025-08-01 RX ORDER — EZETIMIBE 10 MG/1
10 TABLET ORAL DAILY
Qty: 90 TABLET | Refills: 3 | Status: SHIPPED | OUTPATIENT
Start: 2025-08-01 | End: 2026-07-27

## 2025-08-01 RX ORDER — PSEUDOEPHED/ACETAMINOPH/DIPHEN 30MG-500MG
2 TABLET ORAL EVERY 8 HOURS PRN
Qty: 120 TABLET | Refills: 0 | Status: SHIPPED | OUTPATIENT
Start: 2025-08-01

## 2025-08-01 RX ORDER — ATORVASTATIN CALCIUM 80 MG/1
80 TABLET, FILM COATED ORAL NIGHTLY
Qty: 30 TABLET | Refills: 3 | Status: SHIPPED | OUTPATIENT
Start: 2025-08-01

## 2025-08-01 NOTE — TELEPHONE ENCOUNTER
Last visit: 05/09/2025  Last Med refill: 06/04/2025-05/09/2025-08/23/2024  Does patient have enough medication for 72 hours: No:     Next Visit Date:  Future Appointments   Date Time Provider Department Center   8/7/2025  9:00 AM Mio Foley MD [facility]       Health Maintenance   Topic Date Due    COVID-19 Vaccine (1) Never done    Diabetic retinal exam  Never done    Colorectal Cancer Screen  Never done    Lipids  01/09/2024    Hepatitis B vaccine (4 of 4 - Risk Dialysis Recombivax 3-dose series) 01/27/2024    Diabetic foot exam  05/04/2024    Respiratory Syncytial Virus (RSV) Pregnant or age 60 yrs+ (1 - Risk 60-74 years 1-dose series) Never done    Flu vaccine (1) 08/01/2025    Depression Screen  08/23/2025    DTaP/Tdap/Td vaccine (2 - Td or Tdap) 11/11/2025    A1C test (Diabetic or Prediabetic)  05/09/2026    Shingles vaccine  Completed    Pneumococcal 50+ years Vaccine  Completed    Hepatitis C screen  Completed    HIV screen  Completed    Hepatitis A vaccine  Aged Out    Hib vaccine  Aged Out    Polio vaccine  Aged Out    Meningococcal (ACWY) vaccine  Aged Out    Meningococcal B vaccine  Aged Out    Diabetic Alb to Cr ratio (uACR) test  Discontinued    GFR test (Diabetes, CKD 3-4, OR last GFR 15-59)  Discontinued    Pneumococcal 0-49 years Vaccine  Discontinued    Lung Cancer Screening &/or Counseling  Discontinued       Hemoglobin A1C (%)   Date Value   05/09/2025 7.1   10/25/2024 7.4   08/23/2024 10.0             ( goal A1C is < 7)   No components found for: \"LABMICR\"  No components found for: \"LDLCHOLESTEROL\", \"LDLCALC\"    (goal LDL is <100)   AST (U/L)   Date Value   09/06/2023 66 (H)     ALT (U/L)   Date Value   09/06/2023 32     BUN (mg/dL)   Date Value   05/09/2025 26 (H)     BP Readings from Last 3 Encounters:   05/09/25 (!) 170/70   10/25/24 (!) 151/69   09/26/24 (!) 136/59          (goal 120/80)    All Future Testing planned in [facility]  Lab Frequency Next Occurrence   [facility] - Vascular

## 2025-08-07 ENCOUNTER — OFFICE VISIT (OUTPATIENT)
Age: 60
End: 2025-08-07
Payer: MEDICAID

## 2025-08-07 ENCOUNTER — HOSPITAL ENCOUNTER (OUTPATIENT)
Age: 60
Setting detail: SPECIMEN
Discharge: HOME | End: 2025-08-07

## 2025-08-07 VITALS
HEIGHT: 65 IN | WEIGHT: 157.4 LBS | BODY MASS INDEX: 26.23 KG/M2 | SYSTOLIC BLOOD PRESSURE: 151 MMHG | HEART RATE: 76 BPM | DIASTOLIC BLOOD PRESSURE: 71 MMHG | TEMPERATURE: 97.3 F

## 2025-08-07 DIAGNOSIS — Z12.12 SCREENING FOR COLORECTAL CANCER: ICD-10-CM

## 2025-08-07 DIAGNOSIS — Z79.4 TYPE 2 DIABETES MELLITUS WITH DIABETIC POLYNEUROPATHY, WITH LONG-TERM CURRENT USE OF INSULIN (HCC): ICD-10-CM

## 2025-08-07 DIAGNOSIS — L03.032 CELLULITIS OF TOE OF LEFT FOOT: Primary | ICD-10-CM

## 2025-08-07 DIAGNOSIS — Z12.11 SCREENING FOR COLORECTAL CANCER: ICD-10-CM

## 2025-08-07 DIAGNOSIS — I25.10 CORONARY ARTERY DISEASE INVOLVING NATIVE HEART WITHOUT ANGINA PECTORIS, UNSPECIFIED VESSEL OR LESION TYPE: ICD-10-CM

## 2025-08-07 DIAGNOSIS — L97.521 ULCER OF LEFT FOOT, LIMITED TO BREAKDOWN OF SKIN (HCC): ICD-10-CM

## 2025-08-07 DIAGNOSIS — E11.42 TYPE 2 DIABETES MELLITUS WITH DIABETIC POLYNEUROPATHY, WITH LONG-TERM CURRENT USE OF INSULIN (HCC): ICD-10-CM

## 2025-08-07 DIAGNOSIS — N18.32 STAGE 3B CHRONIC KIDNEY DISEASE (HCC): ICD-10-CM

## 2025-08-07 DIAGNOSIS — I10 ESSENTIAL HYPERTENSION: ICD-10-CM

## 2025-08-07 DIAGNOSIS — I73.9 PAD (PERIPHERAL ARTERY DISEASE): ICD-10-CM

## 2025-08-07 LAB
ANION GAP SERPL CALCULATED.3IONS-SCNC: 12 MMOL/L (ref 9–16)
BASOPHILS # BLD: 0.12 K/UL (ref 0–0.2)
BASOPHILS NFR BLD: 1 % (ref 0–2)
BUN SERPL-MCNC: 23 MG/DL (ref 8–23)
CALCIUM SERPL-MCNC: 9.1 MG/DL (ref 8.6–10.4)
CHLORIDE SERPL-SCNC: 106 MMOL/L (ref 98–107)
CHOLEST SERPL-MCNC: 87 MG/DL (ref 0–199)
CHOLESTEROL/HDL RATIO: 3.1
CO2 SERPL-SCNC: 23 MMOL/L (ref 20–31)
CREAT SERPL-MCNC: 2 MG/DL (ref 0.7–1.2)
CREAT UR-MCNC: 101 MG/DL (ref 39–259)
CRP SERPL HS-MCNC: 3.4 MG/L (ref 0–5)
EOSINOPHIL # BLD: 0.39 K/UL (ref 0–0.44)
EOSINOPHILS RELATIVE PERCENT: 3 % (ref 1–4)
ERYTHROCYTE [DISTWIDTH] IN BLOOD BY AUTOMATED COUNT: 13.7 % (ref 11.8–14.4)
ERYTHROCYTE [SEDIMENTATION RATE] IN BLOOD BY PHOTOMETRIC METHOD: 8 MM/HR (ref 0–20)
GFR, ESTIMATED: 37 ML/MIN/1.73M2
GLUCOSE SERPL-MCNC: 115 MG/DL (ref 74–99)
HBA1C MFR BLD: 7.3 %
HCT VFR BLD AUTO: 43.6 % (ref 40.7–50.3)
HDLC SERPL-MCNC: 28 MG/DL
HGB BLD-MCNC: 13.7 G/DL (ref 13–17)
IMM GRANULOCYTES # BLD AUTO: 0.05 K/UL (ref 0–0.3)
IMM GRANULOCYTES NFR BLD: 0 %
LDLC SERPL CALC-MCNC: 32 MG/DL (ref 0–100)
LYMPHOCYTES NFR BLD: 2.31 K/UL (ref 1.1–3.7)
LYMPHOCYTES RELATIVE PERCENT: 18 % (ref 24–43)
MCH RBC QN AUTO: 29.5 PG (ref 25.2–33.5)
MCHC RBC AUTO-ENTMCNC: 31.4 G/DL (ref 28.4–34.8)
MCV RBC AUTO: 94 FL (ref 82.6–102.9)
MICROALBUMIN UR-MCNC: 117 MG/L (ref 0–20)
MICROALBUMIN/CREAT UR-RTO: 116 MCG/MG CREAT (ref 0–17)
MONOCYTES NFR BLD: 0.99 K/UL (ref 0.1–1.2)
MONOCYTES NFR BLD: 8 % (ref 3–12)
NEUTROPHILS NFR BLD: 70 % (ref 36–65)
NEUTS SEG NFR BLD: 9.35 K/UL (ref 1.5–8.1)
NRBC BLD-RTO: 0 PER 100 WBC
PLATELET # BLD AUTO: 355 K/UL (ref 138–453)
PMV BLD AUTO: 10.3 FL (ref 8.1–13.5)
POTASSIUM SERPL-SCNC: 4 MMOL/L (ref 3.7–5.3)
RBC # BLD AUTO: 4.64 M/UL (ref 4.21–5.77)
SODIUM SERPL-SCNC: 141 MMOL/L (ref 136–145)
TRIGL SERPL-MCNC: 133 MG/DL
VLDLC SERPL CALC-MCNC: 27 MG/DL (ref 1–30)
WBC OTHER # BLD: 13.2 K/UL (ref 3.5–11.3)

## 2025-08-07 PROCEDURE — 3078F DIAST BP <80 MM HG: CPT

## 2025-08-07 PROCEDURE — 99213 OFFICE O/P EST LOW 20 MIN: CPT

## 2025-08-07 PROCEDURE — 3051F HG A1C>EQUAL 7.0%<8.0%: CPT

## 2025-08-07 PROCEDURE — 83036 HEMOGLOBIN GLYCOSYLATED A1C: CPT

## 2025-08-07 PROCEDURE — PBSHW POCT GLYCOSYLATED HEMOGLOBIN (HGB A1C)

## 2025-08-07 PROCEDURE — 3077F SYST BP >= 140 MM HG: CPT

## 2025-08-07 PROCEDURE — 99212 OFFICE O/P EST SF 10 MIN: CPT

## 2025-08-07 RX ORDER — SULFAMETHOXAZOLE AND TRIMETHOPRIM 800; 160 MG/1; MG/1
1 TABLET ORAL 2 TIMES DAILY
Qty: 14 TABLET | Refills: 0 | Status: SHIPPED | OUTPATIENT
Start: 2025-08-07 | End: 2025-08-14

## 2025-08-07 RX ORDER — SPIRONOLACTONE 25 MG/1
12.5 TABLET ORAL DAILY
Qty: 45 TABLET | Refills: 1 | Status: SHIPPED | OUTPATIENT
Start: 2025-08-07

## 2025-08-07 RX ORDER — NEOMYCIN/BACITRACIN/POLYMYXINB 3.5-400-5K
OINTMENT (GRAM) TOPICAL
Refills: 1 | Status: CANCELLED | OUTPATIENT
Start: 2025-08-07

## 2025-08-07 ASSESSMENT — PATIENT HEALTH QUESTIONNAIRE - PHQ9
SUM OF ALL RESPONSES TO PHQ QUESTIONS 1-9: 0
SUM OF ALL RESPONSES TO PHQ QUESTIONS 1-9: 0
1. LITTLE INTEREST OR PLEASURE IN DOING THINGS: NOT AT ALL
2. FEELING DOWN, DEPRESSED OR HOPELESS: NOT AT ALL
SUM OF ALL RESPONSES TO PHQ QUESTIONS 1-9: 0
SUM OF ALL RESPONSES TO PHQ QUESTIONS 1-9: 0

## 2025-08-08 ENCOUNTER — TELEPHONE (OUTPATIENT)
Age: 60
End: 2025-08-08

## 2025-08-13 ENCOUNTER — TELEPHONE (OUTPATIENT)
Age: 60
End: 2025-08-13

## 2025-08-15 DIAGNOSIS — L97.521 ULCER OF LEFT FOOT, LIMITED TO BREAKDOWN OF SKIN (HCC): Primary | ICD-10-CM

## 2025-08-18 ENCOUNTER — TELEPHONE (OUTPATIENT)
Dept: VASCULAR SURGERY | Age: 60
End: 2025-08-18

## 2025-08-20 ENCOUNTER — OFFICE VISIT (OUTPATIENT)
Age: 60
End: 2025-08-20
Payer: MEDICAID

## 2025-08-20 ENCOUNTER — HOSPITAL ENCOUNTER (OUTPATIENT)
Dept: GENERAL RADIOLOGY | Age: 60
Discharge: HOME | End: 2025-08-22
Payer: MEDICAID

## 2025-08-20 VITALS
BODY MASS INDEX: 26.16 KG/M2 | WEIGHT: 157 LBS | HEART RATE: 81 BPM | SYSTOLIC BLOOD PRESSURE: 130 MMHG | DIASTOLIC BLOOD PRESSURE: 54 MMHG | HEIGHT: 65 IN

## 2025-08-20 DIAGNOSIS — E11.40 TYPE 2 DIABETES MELLITUS WITH DIABETIC NEUROPATHY, WITHOUT LONG-TERM CURRENT USE OF INSULIN (HCC): ICD-10-CM

## 2025-08-20 DIAGNOSIS — I73.9 PVD (PERIPHERAL VASCULAR DISEASE): ICD-10-CM

## 2025-08-20 DIAGNOSIS — L97.521 ULCER OF LEFT FOOT, LIMITED TO BREAKDOWN OF SKIN (HCC): Primary | ICD-10-CM

## 2025-08-20 DIAGNOSIS — L97.521 ULCER OF LEFT FOOT, LIMITED TO BREAKDOWN OF SKIN (HCC): ICD-10-CM

## 2025-08-20 PROCEDURE — 3075F SYST BP GE 130 - 139MM HG: CPT

## 2025-08-20 PROCEDURE — 1036F TOBACCO NON-USER: CPT

## 2025-08-20 PROCEDURE — 73630 X-RAY EXAM OF FOOT: CPT

## 2025-08-20 PROCEDURE — 3051F HG A1C>EQUAL 7.0%<8.0%: CPT

## 2025-08-20 PROCEDURE — 2022F DILAT RTA XM EVC RTNOPTHY: CPT

## 2025-08-20 PROCEDURE — G8427 DOCREV CUR MEDS BY ELIG CLIN: HCPCS

## 2025-08-20 PROCEDURE — 3017F COLORECTAL CA SCREEN DOC REV: CPT

## 2025-08-20 PROCEDURE — 3078F DIAST BP <80 MM HG: CPT

## 2025-08-20 PROCEDURE — G8419 CALC BMI OUT NRM PARAM NOF/U: HCPCS

## 2025-08-20 PROCEDURE — 99203 OFFICE O/P NEW LOW 30 MIN: CPT | Performed by: PODIATRIST

## 2025-08-20 PROCEDURE — 99204 OFFICE O/P NEW MOD 45 MIN: CPT

## 2025-08-29 ENCOUNTER — HOSPITAL ENCOUNTER (OUTPATIENT)
Dept: VASCULAR LAB | Age: 60
Discharge: HOME | End: 2025-08-31
Payer: MEDICAID

## 2025-08-29 DIAGNOSIS — I73.9 PVD (PERIPHERAL VASCULAR DISEASE): ICD-10-CM

## 2025-08-29 PROCEDURE — 93923 UPR/LXTR ART STDY 3+ LVLS: CPT

## 2025-08-30 LAB
VAS LEFT ABI: 0.5
VAS LEFT ARM BP: 72 MMHG
VAS LEFT DORSALIS PEDIS BP: 57 MMHG
VAS LEFT PTA BP: 66 MMHG
VAS LEFT TBI: 0.12
VAS LEFT TOE PRESSURE: 16 MMHG
VAS RIGHT ARM BP: 133 MMHG

## 2025-08-30 PROCEDURE — 93923 UPR/LXTR ART STDY 3+ LVLS: CPT | Performed by: SURGERY

## 2025-09-03 DIAGNOSIS — Z79.4 TYPE 2 DIABETES MELLITUS WITH DIABETIC POLYNEUROPATHY, WITH LONG-TERM CURRENT USE OF INSULIN (HCC): ICD-10-CM

## 2025-09-03 DIAGNOSIS — E11.42 TYPE 2 DIABETES MELLITUS WITH DIABETIC POLYNEUROPATHY, WITH LONG-TERM CURRENT USE OF INSULIN (HCC): ICD-10-CM

## 2025-09-04 ENCOUNTER — INITIAL CONSULT (OUTPATIENT)
Dept: VASCULAR SURGERY | Age: 60
End: 2025-09-04
Payer: MEDICAID

## 2025-09-04 VITALS
OXYGEN SATURATION: 94 % | HEIGHT: 65 IN | BODY MASS INDEX: 25.99 KG/M2 | TEMPERATURE: 98 F | SYSTOLIC BLOOD PRESSURE: 143 MMHG | DIASTOLIC BLOOD PRESSURE: 66 MMHG | HEART RATE: 78 BPM | WEIGHT: 156 LBS

## 2025-09-04 DIAGNOSIS — L97.521 ULCER OF LEFT FOOT, LIMITED TO BREAKDOWN OF SKIN (HCC): ICD-10-CM

## 2025-09-04 DIAGNOSIS — Z89.611 HX OF AKA (ABOVE KNEE AMPUTATION), RIGHT (HCC): ICD-10-CM

## 2025-09-04 DIAGNOSIS — I73.9 PAD (PERIPHERAL ARTERY DISEASE): Primary | ICD-10-CM

## 2025-09-04 PROCEDURE — 4004F PT TOBACCO SCREEN RCVD TLK: CPT | Performed by: SURGERY

## 2025-09-04 PROCEDURE — 3017F COLORECTAL CA SCREEN DOC REV: CPT | Performed by: SURGERY

## 2025-09-04 PROCEDURE — 3077F SYST BP >= 140 MM HG: CPT | Performed by: SURGERY

## 2025-09-04 PROCEDURE — G8419 CALC BMI OUT NRM PARAM NOF/U: HCPCS | Performed by: SURGERY

## 2025-09-04 PROCEDURE — G8428 CUR MEDS NOT DOCUMENT: HCPCS | Performed by: SURGERY

## 2025-09-04 PROCEDURE — 3078F DIAST BP <80 MM HG: CPT | Performed by: SURGERY

## 2025-09-04 PROCEDURE — 99204 OFFICE O/P NEW MOD 45 MIN: CPT | Performed by: SURGERY

## 2025-09-04 RX ORDER — EMPAGLIFLOZIN 10 MG/1
10 TABLET, FILM COATED ORAL DAILY
Qty: 30 TABLET | Refills: 5 | Status: SHIPPED | OUTPATIENT
Start: 2025-09-04

## 2025-09-04 RX ORDER — DOXYCYCLINE HYCLATE 100 MG
100 TABLET ORAL 2 TIMES DAILY
Qty: 20 TABLET | Refills: 0 | Status: SHIPPED | OUTPATIENT
Start: 2025-09-04 | End: 2025-09-14

## 2025-09-04 ASSESSMENT — ENCOUNTER SYMPTOMS
ABDOMINAL PAIN: 0
ALLERGIC/IMMUNOLOGIC NEGATIVE: 1
CHEST TIGHTNESS: 0
COLOR CHANGE: 1
SHORTNESS OF BREATH: 0

## (undated) DEVICE — DRESSING FOAM SELF ADH 20X10 CM ABSORBENT MEPILEX BORDER

## (undated) DEVICE — KIT SURG PREP POVIDONE IOD PRESATURATED PAINT WET FOR UNIV

## (undated) DEVICE — ZIMMER® STERILE DISPOSABLE TOURNIQUET CUFF WITH PROTECTIVE SLEEVE AND PLC, DUAL PORT, SINGLE BLADDER, 34 IN. (86 CM)

## (undated) DEVICE — DRESSING TRNSPAR W4XL10IN FLM MIC POR SURESITE 123

## (undated) DEVICE — ZIMMER® STERILE DISPOSABLE TOURNIQUET CUFF WITH PROTECTIVE SLEEVE AND PLC, DUAL PORT, SINGLE BLADDER, 24 IN. (61 CM)

## (undated) DEVICE — TUBE IRRIG HNDPC HI FLO TP INTRPULS W/SUCTION TUBE

## (undated) DEVICE — GLOVE SURG SZ 75 CRM LTX FREE POLYISOPRENE POLYMER BEAD ANTI

## (undated) DEVICE — SUTURE VCRL + SZ 3-0 L27IN ABSRB WHT CT-1 1/2 CIR VCP258H

## (undated) DEVICE — Device

## (undated) DEVICE — KIT MICRO INTRO 4FR STIFF 40CM NIGHTENALL TUNGSTEN 7SMT

## (undated) DEVICE — GLOVE SURG SZ 65 L12IN FNGR THK79MIL GRN LTX FREE

## (undated) DEVICE — GLOVE SURG SZ 65 L12IN FNGR THK87MIL WHT LTX FREE

## (undated) DEVICE — GAUZE,SPONGE,4"X4",16PLY,STRL,LF,10/TRAY: Brand: MEDLINE

## (undated) DEVICE — NEEDLE ANGIO 18GA L6.98CM ART ENTRY W/O STYL 1 PART PERC

## (undated) DEVICE — GOWN,AURORA,NONRNF,XL,30/CS: Brand: MEDLINE

## (undated) DEVICE — SURGICAL PROCEDURE TRAY CRD CATH SVMMC

## (undated) DEVICE — COVER,LIGHT HANDLE,FLX,2/PK: Brand: MEDLINE INDUSTRIES, INC.

## (undated) DEVICE — GLOVE SURG SZ 7 L12IN FNGR THK87MIL WHT LTX FREE

## (undated) DEVICE — SUTURE VCRL SZ 0 L27IN ABSRB UD L36MM CT-1 1/2 CIR J260H

## (undated) DEVICE — BLADE CLIPPER GEN PURP NS

## (undated) DEVICE — SWAB CULT CLR BLU PLAS RAYON LIQ AMIES AERB ANAERB FRIC CAP

## (undated) DEVICE — BANDAGE COMPR W6INXL12FT SMOOTH FOR LIMB EXSANG ESMARCH

## (undated) DEVICE — STERNUM BLADE, OFFSET (31.7 X 0.64 X 6.3MM)

## (undated) DEVICE — PLATELET CONCENTRATION PACK PROC 14-20 ML SMARTPREP 2

## (undated) DEVICE — PADDING CAST W6INXL4YD COT LO LINTING WYTEX

## (undated) DEVICE — BNDG,ELSTC,MATRIX,STRL,6"X5YD,LF,HOOK&LP: Brand: MEDLINE

## (undated) DEVICE — BLADE SURG SAW SAG S STL 750MM LEN 75MM CUT DEPTH 250MM W

## (undated) DEVICE — CHLORAPREP 26ML ORANGE

## (undated) DEVICE — GLOVE SURG SZ 75 L12IN FNGR THK79MIL GRN LTX FREE

## (undated) DEVICE — C-ARMOR C-ARM EQUIPMENT COVERS CLEAR STERILE UNIVERSAL FIT 12 PER CASE: Brand: C-ARMOR

## (undated) DEVICE — SUTURE VCRL + SZ 0 L27IN ABSRB UD CT-1 L36MM 1/2 CIR TAPR VCP260H

## (undated) DEVICE — YANKAUER,POOLE TIP,STERILE,50/CS: Brand: MEDLINE

## (undated) DEVICE — BANDAGE,GAUZE,BULKEE II,4.5"X4.1YD,STRL: Brand: MEDLINE

## (undated) DEVICE — AGENT HEMSTAT W2XL14IN OXIDIZED REGENERATED CELOS ABSRB FOR

## (undated) DEVICE — STOCKINETTE,IMPERVIOUS,12X48,STERILE: Brand: MEDLINE

## (undated) DEVICE — SUTURE PROL SZ 0 L30IN NONABSORBABLE BLU L36MM CT-1 1/2 CIR 8424H

## (undated) DEVICE — INTENDED FOR TISSUE SEPARATION, AND OTHER PROCEDURES THAT REQUIRE A SHARP SURGICAL BLADE TO PUNCTURE OR CUT.: Brand: BARD-PARKER ® CARBON RIB-BACK BLADES

## (undated) DEVICE — SYRINGE 20ML LL S/C 50

## (undated) DEVICE — 3M™ IOBAN™ 2 ANTIMICROBIAL INCISE DRAPE 6650EZ: Brand: IOBAN™ 2

## (undated) DEVICE — GLOVE SURG SZ 8 CRM LTX FREE POLYISOPRENE POLYMER BEAD ANTI

## (undated) DEVICE — SVMMC AMPUTATION PK

## (undated) DEVICE — GOWN,SIRUS,POLYRNF,XLN/3XL,18/CS: Brand: MEDLINE

## (undated) DEVICE — SUTURE PERMAHAND SZ 2-0 L12X18IN NONABSORBABLE BLK SILK A185H

## (undated) DEVICE — GLOVE SURG SZ 8 L12IN FNGR THK87MIL WHT LTX FREE

## (undated) DEVICE — DRAPE C ARM UNIV W41XL74IN CLR PLAS XR VELC CLSR POLY STRP

## (undated) DEVICE — Device: Brand: NOMOLINE™ LH ADULT NASAL/ORAL CO2 CANNULA WITH O2 4M

## (undated) DEVICE — PROVE COVER: Brand: UNBRANDED

## (undated) DEVICE — MITT PREP W575XL775IN POVIDONE IOD HAIR REMV

## (undated) DEVICE — INFLATION KIT CUST REV

## (undated) DEVICE — YANKAUER,FLEXIBLE HANDLE,REGLR CAPACITY: Brand: MEDLINE INDUSTRIES, INC.

## (undated) DEVICE — SUTURE PERMAHAND SZ 2-0 L18IN NONABSORBABLE BLK L26MM SH C012D

## (undated) DEVICE — SUTURE VCRL + SZ 2-0 L27IN ABSRB CLR CT-1 1/2 CIR TAPERCUT VCP259H

## (undated) DEVICE — PINNACLE INTRODUCER SHEATH: Brand: PINNACLE

## (undated) DEVICE — KIT SURG PREP POVIDONE IOD WET FOR UNIV USE SPNG STK

## (undated) DEVICE — COVER LT HNDL BLU PLAS

## (undated) DEVICE — GLOVE SURG SZ 6 L12IN FNGR THK87MIL WHT LTX FREE

## (undated) DEVICE — E-Z CLEAN, NON-STICK, PTFE COATED, ELECTROSURGICAL BLADE ELECTRODE, MODIFIED EXTENDED INSULATION, 2.5 INCH (6.35 CM): Brand: MEGADYNE

## (undated) DEVICE — PACK SURG ABD SVMMC

## (undated) DEVICE — BANDAGE COBAN 4 IN COMPR W4INXL5YD FOAM COHESIVE QUIK STK SELF ADH SFT

## (undated) DEVICE — MASIMOSET LNCS NEO  NEONATAL/ADULT PULSE OXIMETER ADHESIVE SENSOR: Brand: MASIMOSET® LNCS® NEO  NEONATAL/ADULT PULSE OXIMETER ADHESIVE SENSOR

## (undated) DEVICE — SUTURE MCRYL SZ 4-0 L18IN ABSRB UD L19MM PS-2 3/8 CIR PRIM Y496G

## (undated) DEVICE — SUTURE PERMAHAND SZ 2-0 L18IN NONABSORBABLE BLK L26MM FS 685G

## (undated) DEVICE — CATH TRAY FOLEY 16FR LTX ANTIMICROB 350ML

## (undated) DEVICE — SUTURE SZ 7 L18IN NONABSORBABLE SIL CCS L48MM 1/2 CIR STRNM M655G

## (undated) DEVICE — GOWN,AURORA,NONREINFORCED,LARGE: Brand: MEDLINE

## (undated) DEVICE — PACK PROCEDURE SURG SVMMC THORACOTOMY

## (undated) DEVICE — CATHETER ANGIO 6FR L100CM DIA0.056IN FR4 CRV VASC ACCS EXPO

## (undated) DEVICE — SKIN AFFIX SURG ADHESIVE 72/CS 0.55ML: Brand: MEDLINE

## (undated) DEVICE — GLOVE SURG SZ 75 L12IN FNGR THK87MIL WHT LTX FREE

## (undated) DEVICE — TOURNIQUET CUF BLD PRESSURE 4X18 IN 2 PRT SINGLE BLDR RED

## (undated) DEVICE — SYRINGE MED 10ML TRNSLUC BRL PLUNG BLK MRK POLYPR CTRL

## (undated) DEVICE — SUTURE PDS II SZ 0 L27IN ABSRB VLT L36MM CT-1 1/2 CIR Z340H

## (undated) DEVICE — GLOVE ORANGE PI 7   MSG9070

## (undated) DEVICE — SPLINT KNEE UNIV FOR LESS THAN 36IN L20IN FOAM LAM E CNTCT

## (undated) DEVICE — SOLUTION IV IRRIG POUR BRL 0.9% SODIUM CHL 2F7124

## (undated) DEVICE — GOWN,SURGICAL,AURORA,SLEEVE: Brand: MEDLINE

## (undated) DEVICE — ZIMMER® STERILE DISPOSABLE TOURNIQUET CUFF WITH PROTECTIVE SLEEVE AND PLC, DUAL PORT, SINGLE BLADDER, 12 IN. (30 CM)

## (undated) DEVICE — NEEDLE HYPO 18GA L1.5IN PNK S STL HUB POLYPR SHLD REG BVL

## (undated) DEVICE — CATHETER GUID OTW 0.035 IN 6 FRX135 CM 5 FR TRAILBLAZER

## (undated) DEVICE — DECANTER BAG 9": Brand: MEDLINE INDUSTRIES, INC.

## (undated) DEVICE — SYRINGE IRRIG 60ML SFT PLIABLE BLB EZ TO GRP 1 HND USE W/

## (undated) DEVICE — APPLICATOR MEDICATED 10.5 CC SOLUTION SCRB TEAL CHLORAPREP

## (undated) DEVICE — SUTURE PROLENE 8-0 BV1758

## (undated) DEVICE — TOWEL,OR,DSP,ST,BLUE,DLX,XR,4/PK,20PK/CS: Brand: MEDLINE

## (undated) DEVICE — THE STERILE LIGHT HANDLE COVER IS USED WITH STERIS SURGICAL LIGHTING AND VISUALIZATION SYSTEMS.

## (undated) DEVICE — MEDI-TRACE CADENCE ADULT, DEFIBRILLATION ELECTRODE -RTS  (10 PR/PK) - PHYSIO-CONTROL: Brand: MEDI-TRACE CADENCE

## (undated) DEVICE — PROTECTOR ULN NRV PUR FOAM HK LOOP STRP ANATOMICALLY